# Patient Record
Sex: MALE | Race: OTHER | HISPANIC OR LATINO | URBAN - METROPOLITAN AREA
[De-identification: names, ages, dates, MRNs, and addresses within clinical notes are randomized per-mention and may not be internally consistent; named-entity substitution may affect disease eponyms.]

---

## 2021-03-14 ENCOUNTER — INPATIENT (INPATIENT)
Facility: HOSPITAL | Age: 56
LOS: 88 days | Discharge: NURSING, REHAB OR LONG-TERM CARE | DRG: 3 | End: 2021-06-11
Attending: INTERNAL MEDICINE | Admitting: INTERNAL MEDICINE
Payer: COMMERCIAL

## 2021-03-14 VITALS
HEIGHT: 66 IN | HEART RATE: 112 BPM | RESPIRATION RATE: 42 BRPM | TEMPERATURE: 100 F | WEIGHT: 184.97 LBS | OXYGEN SATURATION: 88 % | SYSTOLIC BLOOD PRESSURE: 136 MMHG | DIASTOLIC BLOOD PRESSURE: 76 MMHG

## 2021-03-14 DIAGNOSIS — Z90.49 ACQUIRED ABSENCE OF OTHER SPECIFIED PARTS OF DIGESTIVE TRACT: Chronic | ICD-10-CM

## 2021-03-14 DIAGNOSIS — N39.0 URINARY TRACT INFECTION, SITE NOT SPECIFIED: ICD-10-CM

## 2021-03-14 DIAGNOSIS — Z29.9 ENCOUNTER FOR PROPHYLACTIC MEASURES, UNSPECIFIED: ICD-10-CM

## 2021-03-14 DIAGNOSIS — R09.02 HYPOXEMIA: ICD-10-CM

## 2021-03-14 DIAGNOSIS — U07.1 COVID-19: ICD-10-CM

## 2021-03-14 DIAGNOSIS — R07.9 CHEST PAIN, UNSPECIFIED: ICD-10-CM

## 2021-03-14 LAB
ALBUMIN SERPL ELPH-MCNC: 2.6 G/DL — LOW (ref 3.5–5)
ALP SERPL-CCNC: 74 U/L — SIGNIFICANT CHANGE UP (ref 40–120)
ALT FLD-CCNC: 114 U/L DA — HIGH (ref 10–60)
ANION GAP SERPL CALC-SCNC: 12 MMOL/L — SIGNIFICANT CHANGE UP (ref 5–17)
APPEARANCE UR: CLEAR — SIGNIFICANT CHANGE UP
AST SERPL-CCNC: 108 U/L — HIGH (ref 10–40)
BACTERIA # UR AUTO: ABNORMAL /HPF
BASOPHILS # BLD AUTO: 0.01 K/UL — SIGNIFICANT CHANGE UP (ref 0–0.2)
BASOPHILS NFR BLD AUTO: 0.1 % — SIGNIFICANT CHANGE UP (ref 0–2)
BILIRUB SERPL-MCNC: 0.5 MG/DL — SIGNIFICANT CHANGE UP (ref 0.2–1.2)
BILIRUB UR-MCNC: NEGATIVE — SIGNIFICANT CHANGE UP
BUN SERPL-MCNC: 15 MG/DL — SIGNIFICANT CHANGE UP (ref 7–18)
CALCIUM SERPL-MCNC: 7.9 MG/DL — LOW (ref 8.4–10.5)
CHLORIDE SERPL-SCNC: 103 MMOL/L — SIGNIFICANT CHANGE UP (ref 96–108)
CK SERPL-CCNC: 238 U/L — HIGH (ref 35–232)
CO2 SERPL-SCNC: 23 MMOL/L — SIGNIFICANT CHANGE UP (ref 22–31)
COLOR SPEC: YELLOW — SIGNIFICANT CHANGE UP
CREAT SERPL-MCNC: 1.1 MG/DL — SIGNIFICANT CHANGE UP (ref 0.5–1.3)
CRP SERPL-MCNC: 232 MG/L — HIGH
D DIMER BLD IA.RAPID-MCNC: 423 NG/ML DDU — HIGH
DIFF PNL FLD: ABNORMAL
EOSINOPHIL # BLD AUTO: 0 K/UL — SIGNIFICANT CHANGE UP (ref 0–0.5)
EOSINOPHIL NFR BLD AUTO: 0 % — SIGNIFICANT CHANGE UP (ref 0–6)
EPI CELLS # UR: SIGNIFICANT CHANGE UP /HPF
FERRITIN SERPL-MCNC: 2803 NG/ML — HIGH (ref 30–400)
GLUCOSE SERPL-MCNC: 125 MG/DL — HIGH (ref 70–99)
GLUCOSE UR QL: NEGATIVE — SIGNIFICANT CHANGE UP
HCT VFR BLD CALC: 41.9 % — SIGNIFICANT CHANGE UP (ref 39–50)
HGB BLD-MCNC: 14.4 G/DL — SIGNIFICANT CHANGE UP (ref 13–17)
IMM GRANULOCYTES NFR BLD AUTO: 0.6 % — SIGNIFICANT CHANGE UP (ref 0–1.5)
KETONES UR-MCNC: NEGATIVE — SIGNIFICANT CHANGE UP
LACTATE SERPL-SCNC: 1.9 MMOL/L — SIGNIFICANT CHANGE UP (ref 0.7–2)
LACTATE SERPL-SCNC: 3.3 MMOL/L — HIGH (ref 0.7–2)
LEUKOCYTE ESTERASE UR-ACNC: NEGATIVE — SIGNIFICANT CHANGE UP
LYMPHOCYTES # BLD AUTO: 0.48 K/UL — LOW (ref 1–3.3)
LYMPHOCYTES # BLD AUTO: 6.9 % — LOW (ref 13–44)
MCHC RBC-ENTMCNC: 32 PG — SIGNIFICANT CHANGE UP (ref 27–34)
MCHC RBC-ENTMCNC: 34.4 GM/DL — SIGNIFICANT CHANGE UP (ref 32–36)
MCV RBC AUTO: 93.1 FL — SIGNIFICANT CHANGE UP (ref 80–100)
MONOCYTES # BLD AUTO: 0.2 K/UL — SIGNIFICANT CHANGE UP (ref 0–0.9)
MONOCYTES NFR BLD AUTO: 2.9 % — SIGNIFICANT CHANGE UP (ref 2–14)
NEUTROPHILS # BLD AUTO: 6.23 K/UL — SIGNIFICANT CHANGE UP (ref 1.8–7.4)
NEUTROPHILS NFR BLD AUTO: 89.5 % — HIGH (ref 43–77)
NITRITE UR-MCNC: NEGATIVE — SIGNIFICANT CHANGE UP
NRBC # BLD: 0 /100 WBCS — SIGNIFICANT CHANGE UP (ref 0–0)
PH UR: 6.5 — SIGNIFICANT CHANGE UP (ref 5–8)
PLATELET # BLD AUTO: 155 K/UL — SIGNIFICANT CHANGE UP (ref 150–400)
POTASSIUM SERPL-MCNC: 3.6 MMOL/L — SIGNIFICANT CHANGE UP (ref 3.5–5.3)
POTASSIUM SERPL-SCNC: 3.6 MMOL/L — SIGNIFICANT CHANGE UP (ref 3.5–5.3)
PROCALCITONIN SERPL-MCNC: 0.54 NG/ML — HIGH (ref 0.02–0.1)
PROT SERPL-MCNC: 6.5 G/DL — SIGNIFICANT CHANGE UP (ref 6–8.3)
PROT UR-MCNC: 100
RAPID RVP RESULT: DETECTED
RBC # BLD: 4.5 M/UL — SIGNIFICANT CHANGE UP (ref 4.2–5.8)
RBC # FLD: 13.2 % — SIGNIFICANT CHANGE UP (ref 10.3–14.5)
RBC CASTS # UR COMP ASSIST: SIGNIFICANT CHANGE UP /HPF (ref 0–2)
SARS-COV-2 RNA SPEC QL NAA+PROBE: DETECTED
SODIUM SERPL-SCNC: 138 MMOL/L — SIGNIFICANT CHANGE UP (ref 135–145)
SP GR SPEC: 1.01 — SIGNIFICANT CHANGE UP (ref 1.01–1.02)
TROPONIN I SERPL-MCNC: <0.015 NG/ML — SIGNIFICANT CHANGE UP (ref 0–0.04)
TROPONIN I SERPL-MCNC: <0.015 NG/ML — SIGNIFICANT CHANGE UP (ref 0–0.04)
UROBILINOGEN FLD QL: 1
WBC # BLD: 6.96 K/UL — SIGNIFICANT CHANGE UP (ref 3.8–10.5)
WBC # FLD AUTO: 6.96 K/UL — SIGNIFICANT CHANGE UP (ref 3.8–10.5)
WBC UR QL: SIGNIFICANT CHANGE UP /HPF (ref 0–5)

## 2021-03-14 PROCEDURE — 99285 EMERGENCY DEPT VISIT HI MDM: CPT

## 2021-03-14 PROCEDURE — 71045 X-RAY EXAM CHEST 1 VIEW: CPT | Mod: 26

## 2021-03-14 RX ORDER — SODIUM CHLORIDE 9 MG/ML
1000 INJECTION INTRAMUSCULAR; INTRAVENOUS; SUBCUTANEOUS ONCE
Refills: 0 | Status: DISCONTINUED | OUTPATIENT
Start: 2021-03-14 | End: 2021-03-14

## 2021-03-14 RX ORDER — ACETAMINOPHEN 500 MG
975 TABLET ORAL ONCE
Refills: 0 | Status: COMPLETED | OUTPATIENT
Start: 2021-03-14 | End: 2021-03-14

## 2021-03-14 RX ORDER — DEXAMETHASONE 0.5 MG/5ML
6 ELIXIR ORAL DAILY
Refills: 0 | Status: COMPLETED | OUTPATIENT
Start: 2021-03-14 | End: 2021-03-24

## 2021-03-14 RX ORDER — INSULIN LISPRO 100/ML
VIAL (ML) SUBCUTANEOUS
Refills: 0 | Status: DISCONTINUED | OUTPATIENT
Start: 2021-03-14 | End: 2021-03-29

## 2021-03-14 RX ORDER — GLUCAGON INJECTION, SOLUTION 0.5 MG/.1ML
1 INJECTION, SOLUTION SUBCUTANEOUS ONCE
Refills: 0 | Status: DISCONTINUED | OUTPATIENT
Start: 2021-03-14 | End: 2021-03-14

## 2021-03-14 RX ORDER — REMDESIVIR 5 MG/ML
100 INJECTION INTRAVENOUS EVERY 24 HOURS
Refills: 0 | Status: DISCONTINUED | OUTPATIENT
Start: 2021-03-15 | End: 2021-03-15

## 2021-03-14 RX ORDER — REMDESIVIR 5 MG/ML
INJECTION INTRAVENOUS
Refills: 0 | Status: DISCONTINUED | OUTPATIENT
Start: 2021-03-14 | End: 2021-03-15

## 2021-03-14 RX ORDER — SODIUM CHLORIDE 9 MG/ML
1000 INJECTION, SOLUTION INTRAVENOUS
Refills: 0 | Status: DISCONTINUED | OUTPATIENT
Start: 2021-03-14 | End: 2021-03-14

## 2021-03-14 RX ORDER — REMDESIVIR 5 MG/ML
200 INJECTION INTRAVENOUS EVERY 24 HOURS
Refills: 0 | Status: COMPLETED | OUTPATIENT
Start: 2021-03-14 | End: 2021-03-14

## 2021-03-14 RX ORDER — ACETAMINOPHEN 500 MG
650 TABLET ORAL EVERY 6 HOURS
Refills: 0 | Status: DISCONTINUED | OUTPATIENT
Start: 2021-03-14 | End: 2021-03-29

## 2021-03-14 RX ORDER — PANTOPRAZOLE SODIUM 20 MG/1
40 TABLET, DELAYED RELEASE ORAL
Refills: 0 | Status: DISCONTINUED | OUTPATIENT
Start: 2021-03-14 | End: 2021-03-29

## 2021-03-14 RX ORDER — ENOXAPARIN SODIUM 100 MG/ML
40 INJECTION SUBCUTANEOUS DAILY
Refills: 0 | Status: DISCONTINUED | OUTPATIENT
Start: 2021-03-14 | End: 2021-04-03

## 2021-03-14 RX ORDER — ALBUTEROL 90 UG/1
2 AEROSOL, METERED ORAL EVERY 6 HOURS
Refills: 0 | Status: DISCONTINUED | OUTPATIENT
Start: 2021-03-14 | End: 2021-03-18

## 2021-03-14 RX ORDER — SODIUM CHLORIDE 9 MG/ML
1000 INJECTION INTRAMUSCULAR; INTRAVENOUS; SUBCUTANEOUS ONCE
Refills: 0 | Status: COMPLETED | OUTPATIENT
Start: 2021-03-14 | End: 2021-03-14

## 2021-03-14 RX ADMIN — Medication 975 MILLIGRAM(S): at 13:01

## 2021-03-14 RX ADMIN — REMDESIVIR 500 MILLIGRAM(S): 5 INJECTION INTRAVENOUS at 17:25

## 2021-03-14 RX ADMIN — SODIUM CHLORIDE 1000 MILLILITER(S): 9 INJECTION INTRAMUSCULAR; INTRAVENOUS; SUBCUTANEOUS at 15:23

## 2021-03-14 RX ADMIN — SODIUM CHLORIDE 1000 MILLILITER(S): 9 INJECTION INTRAMUSCULAR; INTRAVENOUS; SUBCUTANEOUS at 14:23

## 2021-03-14 RX ADMIN — Medication 975 MILLIGRAM(S): at 14:00

## 2021-03-14 NOTE — H&P ADULT - NSHPPHYSICALEXAM_GEN_ALL_CORE
VS: T 99.1, HR 76, /65, RR 18, and SPO2 98%  General: diaphoretic, uncomfortable -appearing  male of stated age  HEENT: EOMI, PERRL, atraumatic, normocephalic, neck supple  Resp: no increased WOB, significant coughing with deeep breath, NC in place  CV: RRR  GI: soft, NTND  MSK: no joint tenderness  Neuro: AAOx3, no FND  Psych: appropriate mood and affect  Heme: no unsual bruising noted  Skin: CDI

## 2021-03-14 NOTE — ED PROVIDER NOTE - CLINICAL SUMMARY MEDICAL DECISION MAKING FREE TEXT BOX
Patient with hypoxia and tachypnea. Suspect COVID-19. Will admit for administration of supplemental oxygen.

## 2021-03-14 NOTE — H&P ADULT - PROBLEM SELECTOR PLAN 1
presented 3/14 with x9 days worsening cough, subjective fevers, and SOB  -in ED, T 99.1, HR 76, /65, RR 18, and SPO2 98%  -s/p x1 dose Tylenol and 1L IVF bolus in ED  -COVID +PCR, fu Ab  -labs with lymphopenia and neutrophilia despite WBC wnl, d-dimer 423, AST//114, consistent with COVID  -lactate 3.3, and , possibly 2/2 prolonged poor PO intake in setting of acute illness  -CXR notable for b/l infiltrates, L>R  -fu ABG to assess disease severity  -remdesivir (Dr. Villalba approved) 3/14-18, Decadron 3/14-23 with PPI and HSS while on steroids  -supportive care with Tylenol, robitussin, albuterol  -fu LDH, CRP, ferritin, procal in am, repeat d-dimer 3/17 presented 3/14 with x9 days worsening cough, subjective fevers, and SOB  -in ED, T 99.1, HR 76, /65, RR 18, and SPO2 98%  -s/p x1 dose Tylenol and 1L IVF bolus in ED  -COVID +PCR, fu Ab  -labs with lymphopenia and neutrophilia despite WBC wnl, d-dimer 423, AST//114, consistent with COVID  -lactate 3.3, and , possibly 2/2 prolonged poor PO intake in setting of acute illness  -CXR notable for b/l infiltrates, L>R  -fu ABG to assess disease severity  -remdesivir (Dr. Villalba approved) 3/14-18, Decadron 3/14-23 with PPI and HSS while on steroids  -supportive care with Tylenol, robitussin, albuterol  -fu LDH, CRP, ferritin, procal in am, repeat d-dimer 3/17, monitor LFTs with daily CMP presented 3/14 with x9 days worsening cough, subjective fevers, and SOB  -in ED, T 99.1, HR 76, /65, RR 18, and SPO2 98%  -s/p x1 dose Tylenol and 1L IVF bolus in ED  -COVID +PCR, fu Ab  -labs with lymphopenia and neutrophilia despite WBC wnl, d-dimer 423, AST//114, consistent with COVID  -lactate 3.3, and , possibly 2/2 prolonged poor PO intake in setting of acute illness, fu repeat lactate after bolus ot make sure downtrending  -CXR notable for b/l infiltrates, L>R  -fu ABG to assess disease severity  -remdesivir (Dr. Villalba approved) 3/14-18, Decadron 3/14-23 with PPI and HSS while on steroids  -supportive care with Tylenol, robitussin, albuterol  -fu LDH, CRP, ferritin, procal in am, repeat d-dimer 3/17, monitor LFTs with daily CMP presented 3/14 with x9 days worsening cough, subjective fevers, and SOB  -in ED, T 99.1, HR 76, /65, RR 18, and SPO2 98% RA  -s/p x1 dose Tylenol and 1L IVF bolus in ED  -COVID +PCR, fu Ab  -labs with lymphopenia and neutrophilia despite WBC wnl, d-dimer 423, AST//114, consistent with COVID  -lactate 3.3, and , possibly 2/2 prolonged poor PO intake in setting of acute illness, fu repeat lactate after bolus ot make sure downtrending  -CXR notable for b/l infiltrates, L>R  -fu ABG to assess disease severity  -remdesivir (Dr. Villalba approved) 3/14-18, Decadron 3/14-23 with PPI and HSS while on steroids  -supportive care with Tylenol, robitussin, albuterol  -fu LDH, CRP, ferritin, procal in am, repeat d-dimer 3/17, monitor LFTs with daily CMP presented 3/14 with x9 days worsening cough, subjective fevers, and SOB  -in ED, T 99.1, HR 76, /65, RR 18, and SPO2 98% RA  -s/p x1 dose Tylenol and 1L IVF bolus in ED  -requiring 4L NC for ease of breathing in ED  -COVID +PCR, fu Ab  -labs with lymphopenia and neutrophilia despite WBC wnl, d-dimer 423, AST//114, consistent with COVID  -lactate 3.3, and , possibly 2/2 prolonged poor PO intake in setting of acute illness, fu repeat lactate after bolus ot make sure downtrending  -CXR notable for b/l infiltrates, L>R  -fu ABG to assess disease severity  -remdesivir (Dr. Villalba approved) 3/14-18, Decadron 3/14-23 with PPI and HSS while on steroids  -supportive care with Tylenol, robitussin, albuterol  -fu LDH, CRP, ferritin, procal in am, repeat d-dimer 3/17, monitor LFTs with daily CMP

## 2021-03-14 NOTE — H&P ADULT - ASSESSMENT
55M, no PMH, PSH appy and moody 1990s presented 3/14 with x9 days worsening cough, subjective fevers, and SOB, with x2-3 days dysuria and central, non-radiating, constant CP, admitted to [facility] for management of COVID PNA.

## 2021-03-14 NOTE — H&P ADULT - HISTORY OF PRESENT ILLNESS
55M, no PMH, PSH appy and moody 1990s presented 3/14 with x9 days worsening cough, subjective fevers, and SOB, with x2-3 days dysuria and central, non-radiating, constant CP. Additionally endorses HA, LH, and myaglias, and denies abdominal pain and n/v/d. Patient is a nonsmoker, lives alone, and denies sick contacts. In ED, T 99.1, HR 76, /65, RR 18, and SPO2 98%. Labs notable for lymphopenia and neutrophilia despite WBC wnl, d-dimer 423, AST//114, lactate 3.3, and . Trop negative x1. +COVID. CXR notable for b/l infiltrates, L>R. Given x1 dose Tylenol and 1L IVF bolus in ED. Admitted to [facility] for management of COVID PNA.  55M, no PMH, PSH appy and moody 1990s presented 3/14 with x9 days worsening cough, subjective fevers, and SOB, with x2-3 days dysuria and central, non-radiating, constant CP. Additionally endorses HA, LH, and myaglias, and denies abdominal pain and n/v/d. Patient is a nonsmoker, lives alone, and denies sick contacts. In ED, T 99.1, HR 76, /65, RR 18, and SPO2 98%. Given discomfort of breathing, placed on 4L NC, with improvement. Labs notable for lymphopenia and neutrophilia despite WBC wnl, d-dimer 423, AST//114, lactate 3.3, and . Trop negative x1. +COVID. CXR notable for b/l infiltrates, L>R. Given x1 dose Tylenol and 1L IVF bolus in ED. Admitted to [facility] for management of COVID PNA.

## 2021-03-14 NOTE — H&P ADULT - PROBLEM SELECTOR PLAN 3
on adm endorsed x2 days central, non-radiating, constant CP, resolved at time of interview  -trop negative x1, fu repeat x2  -fu EKG  -unlikely to be ACS given HEART score 1-3 for age, possible obesity (BMI not yet listed), and unknown EKG results; KIM 0-1 given unknown EKG results  -fu a1c, B12, folate, TSH, and lipid panel  -DASH diet

## 2021-03-14 NOTE — ED PROVIDER NOTE - OBJECTIVE STATEMENT
55 year old male with no significant PMHx or PSHx presents to the ED with complaints of nine days of fevers, body aches, shortness of breath, and weakness. Patient states that he has not had any known sick contacts. Patient reports that he has not had a COVID-19 test done. Patient otherwise denies any vomiting, diarrhea, and all other acute complaints. NKDA.

## 2021-03-14 NOTE — ED ADULT NURSE NOTE - CHIEF COMPLAINT QUOTE
C/o fever, headache and difficulty breathing x 9 days. Brother Alec [phone]. Tylenol taken at 6am

## 2021-03-14 NOTE — H&P ADULT - PROBLEM SELECTOR PLAN 2
x2-3 days dysuria at time of presentation  -fu UA and UCx x2-3 days dysuria at time of presentation  -fu UA and UCx  -will add abx if UA positive

## 2021-03-14 NOTE — ED ADULT NURSE NOTE - NSIMPLEMENTINTERV_GEN_ALL_ED
Implemented All Universal Safety Interventions:  [place] to call system. Call bell, personal items and telephone within reach. Instruct patient to call for assistance. Room bathroom lighting operational. Non-slip footwear when patient is off stretcher. Physically safe environment: no spills, clutter or unnecessary equipment. Stretcher in lowest position, wheels locked, appropriate side rails in place.

## 2021-03-14 NOTE — ED ADULT TRIAGE NOTE - CHIEF COMPLAINT QUOTE
C/o fever, headache and difficulty breathing x 9 days. Brother Alec [phone]. Tylenol taken at 6am

## 2021-03-14 NOTE — H&P ADULT - NSHPREVIEWOFSYSTEMS_GEN_ALL_CORE
General: malaise, feverish  HEENT: HA, LH, no vision changes, no recent LOC   Resp: SOB, OCONNELL, cough, worse with deep inspiration  CV: central CP, resolved at time of interview  GI: no abdominal pain  : dysuria, no changes in frequency  MSK: diffuse myalgias, no arthralgias  Neuro: no FND, no n/t  Psych: no issues  Heme: denies bruising  Skin: no itching or rashes  all other systems reviewed and are negative

## 2021-03-15 DIAGNOSIS — R74.01 ELEVATION OF LEVELS OF LIVER TRANSAMINASE LEVELS: ICD-10-CM

## 2021-03-15 LAB
A1C WITH ESTIMATED AVERAGE GLUCOSE RESULT: 5.8 % — HIGH (ref 4–5.6)
ALBUMIN SERPL ELPH-MCNC: 2.3 G/DL — LOW (ref 3.5–5)
ALP SERPL-CCNC: 72 U/L — SIGNIFICANT CHANGE UP (ref 40–120)
ALT FLD-CCNC: 170 U/L DA — HIGH (ref 10–60)
ANION GAP SERPL CALC-SCNC: 9 MMOL/L — SIGNIFICANT CHANGE UP (ref 5–17)
AST SERPL-CCNC: 147 U/L — HIGH (ref 10–40)
BILIRUB SERPL-MCNC: 0.6 MG/DL — SIGNIFICANT CHANGE UP (ref 0.2–1.2)
BUN SERPL-MCNC: 15 MG/DL — SIGNIFICANT CHANGE UP (ref 7–18)
CALCIUM SERPL-MCNC: 7.6 MG/DL — LOW (ref 8.4–10.5)
CHLORIDE SERPL-SCNC: 106 MMOL/L — SIGNIFICANT CHANGE UP (ref 96–108)
CHOLEST SERPL-MCNC: 102 MG/DL — SIGNIFICANT CHANGE UP
CO2 SERPL-SCNC: 25 MMOL/L — SIGNIFICANT CHANGE UP (ref 22–31)
CREAT SERPL-MCNC: 0.81 MG/DL — SIGNIFICANT CHANGE UP (ref 0.5–1.3)
CRP SERPL-MCNC: 234 MG/L — HIGH
CULTURE RESULTS: NO GROWTH — SIGNIFICANT CHANGE UP
ESTIMATED AVERAGE GLUCOSE: 120 MG/DL — HIGH (ref 68–114)
FERRITIN SERPL-MCNC: 3716 NG/ML — HIGH (ref 30–400)
FOLATE SERPL-MCNC: 15.7 NG/ML — SIGNIFICANT CHANGE UP
GLUCOSE SERPL-MCNC: 104 MG/DL — HIGH (ref 70–99)
HCT VFR BLD CALC: 40.8 % — SIGNIFICANT CHANGE UP (ref 39–50)
HCV AB S/CO SERPL IA: 0.09 S/CO — SIGNIFICANT CHANGE UP (ref 0–0.99)
HCV AB SERPL-IMP: SIGNIFICANT CHANGE UP
HDLC SERPL-MCNC: 37 MG/DL — LOW
HGB BLD-MCNC: 13.7 G/DL — SIGNIFICANT CHANGE UP (ref 13–17)
LDH SERPL L TO P-CCNC: 595 U/L — HIGH (ref 120–225)
LIPID PNL WITH DIRECT LDL SERPL: 45 MG/DL — SIGNIFICANT CHANGE UP
MAGNESIUM SERPL-MCNC: 2.3 MG/DL — SIGNIFICANT CHANGE UP (ref 1.6–2.6)
MCHC RBC-ENTMCNC: 31.3 PG — SIGNIFICANT CHANGE UP (ref 27–34)
MCHC RBC-ENTMCNC: 33.6 GM/DL — SIGNIFICANT CHANGE UP (ref 32–36)
MCV RBC AUTO: 93.2 FL — SIGNIFICANT CHANGE UP (ref 80–100)
NON HDL CHOLESTEROL: 65 MG/DL — SIGNIFICANT CHANGE UP
NRBC # BLD: 0 /100 WBCS — SIGNIFICANT CHANGE UP (ref 0–0)
PHOSPHATE SERPL-MCNC: 2.9 MG/DL — SIGNIFICANT CHANGE UP (ref 2.5–4.5)
PLATELET # BLD AUTO: 159 K/UL — SIGNIFICANT CHANGE UP (ref 150–400)
POTASSIUM SERPL-MCNC: 3.7 MMOL/L — SIGNIFICANT CHANGE UP (ref 3.5–5.3)
POTASSIUM SERPL-SCNC: 3.7 MMOL/L — SIGNIFICANT CHANGE UP (ref 3.5–5.3)
PROCALCITONIN SERPL-MCNC: 0.66 NG/ML — HIGH (ref 0.02–0.1)
PROT SERPL-MCNC: 6.1 G/DL — SIGNIFICANT CHANGE UP (ref 6–8.3)
RBC # BLD: 4.38 M/UL — SIGNIFICANT CHANGE UP (ref 4.2–5.8)
RBC # FLD: 13.3 % — SIGNIFICANT CHANGE UP (ref 10.3–14.5)
SARS-COV-2 IGG SERPL QL IA: POSITIVE
SARS-COV-2 IGM SERPL IA-ACNC: 6.41 INDEX — HIGH
SODIUM SERPL-SCNC: 140 MMOL/L — SIGNIFICANT CHANGE UP (ref 135–145)
SPECIMEN SOURCE: SIGNIFICANT CHANGE UP
TRIGL SERPL-MCNC: 100 MG/DL — SIGNIFICANT CHANGE UP
TROPONIN I SERPL-MCNC: <0.015 NG/ML — SIGNIFICANT CHANGE UP (ref 0–0.04)
TSH SERPL-MCNC: 0.26 UU/ML — LOW (ref 0.34–4.82)
VIT B12 SERPL-MCNC: 1549 PG/ML — HIGH (ref 232–1245)
WBC # BLD: 6.84 K/UL — SIGNIFICANT CHANGE UP (ref 3.8–10.5)
WBC # FLD AUTO: 6.84 K/UL — SIGNIFICANT CHANGE UP (ref 3.8–10.5)

## 2021-03-15 RX ADMIN — Medication 650 MILLIGRAM(S): at 08:45

## 2021-03-15 RX ADMIN — Medication 6 MILLIGRAM(S): at 05:59

## 2021-03-15 RX ADMIN — ENOXAPARIN SODIUM 40 MILLIGRAM(S): 100 INJECTION SUBCUTANEOUS at 12:02

## 2021-03-15 RX ADMIN — PANTOPRAZOLE SODIUM 40 MILLIGRAM(S): 20 TABLET, DELAYED RELEASE ORAL at 08:05

## 2021-03-15 RX ADMIN — Medication 1: at 11:21

## 2021-03-15 NOTE — PATIENT PROFILE ADULT - TRANSPORTATION
no

## 2021-03-15 NOTE — ED ADULT NURSE REASSESSMENT NOTE - NS ED NURSE REASSESS COMMENT FT1
Patient A&OX4, 4remains in stable condition. No acute respiratory distress noted. Denies any c/o pain/discomfort.

## 2021-03-15 NOTE — CONSULT NOTE ADULT - SUBJECTIVE AND OBJECTIVE BOX
PULMONARY CONSULT NOTE      ARANZA CHAMBERS  MRN-[id number]    Patient is a 55y old  Male who presents with a chief complaint of SOB (15 Mar 2021 07:40)    History of Present Illness:  Reason for Admission: SOB  History of Present Illness:   55M, no PMH, PSH appy and moody  presented 3/ with x9 days worsening cough, subjective fevers, and SOB, with x2-3 days dysuria and central, non-radiating, constant CP. Additionally endorses HA, LH, and myaglias, and denies abdominal pain and n/v/d. Patient is a nonsmoker, lives alone, and denies sick contacts. In ED, T 99.1, HR 76, /65, RR 18, and SPO2 98%. Given discomfort of breathing, placed on 4L NC, with improvement. Labs notable for lymphopenia and neutrophilia despite WBC wnl, d-dimer 423, AST//114, lactate 3.3, and . Trop negative x1. +COVID. CXR notable for b/l infiltrates, L>R. Given x1 dose Tylenol and 1L IVF bolus in ED. Admitted to [facility] for management of COVID PNA.     HISTORY OF PRESENT ILLNESS: As above. Awake, alert, comfortable in bed in NAD. Currently on oxygen supp via NRBM    MEDICATIONS  (STANDING):  dexAMETHasone     Tablet 6 milliGRAM(s) Oral daily  enoxaparin Injectable 40 milliGRAM(s) SubCutaneous daily  insulin lispro (ADMELOG) corrective regimen sliding scale   SubCutaneous three times a day before meals  pantoprazole    Tablet 40 milliGRAM(s) Oral before breakfast      MEDICATIONS  (PRN):  acetaminophen   Tablet .. 650 milliGRAM(s) Oral every 6 hours PRN Temp greater or equal to 38C (100.4F), Moderate Pain (4 - 6)  ALBUTerol    90 MICROgram(s) HFA Inhaler 2 Puff(s) Inhalation every 6 hours PRN Shortness of Breath and/or Wheezing  guaiFENesin   Syrup  (Sugar-Free) 100 milliGRAM(s) Oral every 6 hours PRN Cough      Allergies    No Known Allergies    Intolerances        PAST MEDICAL & SURGICAL HISTORY:  No pertinent past medical history    S/P appendectomy      S/P moody          FAMILY HISTORY:      SOCIAL HISTORY  Smoking History:     REVIEW OF SYSTEMS:    CONSTITUTIONAL:  No fevers, chills, sweats    HEENT:  Eyes:  No diplopia or blurred vision. ENT:  No earache, sore throat or runny nose.    CARDIOVASCULAR:  No pressure, squeezing, tightness, or heaviness about the chest; no palpitations.    RESPIRATORY:  Per HPI    GASTROINTESTINAL:  No abdominal pain, nausea, vomiting or diarrhea.    GENITOURINARY:  No dysuria, frequency or urgency.    NEUROLOGIC:  No paresthesias, fasciculations, seizures or weakness.    PSYCHIATRIC:  No disorder of thought or mood.    Vital Signs Last 24 Hrs  T(C): 38.4 (15 Mar 2021 08:24), Max: 38.4 (15 Mar 2021 08:24)  T(F): 101.1 (15 Mar 2021 08:24), Max: 101.1 (15 Mar 2021 08:24)  HR: 76 (15 Mar 2021 07:58) (76 - 112)  BP: 115/67 (15 Mar 2021 07:58) (115/67 - 158/83)  BP(mean): --  RR: 17 (15 Mar 2021 07:58) (17 - 42)  SpO2: 96% (15 Mar 2021 07:58) (88% - 98%)  I&O's Detail      PHYSICAL EXAMINATION:    GENERAL: The patient is a well-developed, well-nourished _____in no apparent distress.     HEENT: Head is normocephalic and atraumatic. Extraocular muscles are intact. Mucous membranes are moist.     NECK: Supple.     LUNGS: Clear to auscultation without wheezing, rales, or rhonchi. Respirations unlabored    HEART: Regular rate and rhythm without murmur.    ABDOMEN: Soft, nontender, and nondistended.  No hepatosplenomegaly is noted.    EXTREMITIES: Without any cyanosis, clubbing, rash, lesions or edema.    NEUROLOGIC: Grossly intact.      LABS:                        13.7   6.84  )-----------( 159      ( 15 Mar 2021 05:38 )             40.8     03-15    140  |  106  |  15  ----------------------------<  104<H>  3.7   |  25  |  0.81    Ca    7.6<L>      15 Mar 2021 05:38  Phos  2.9     03-15  Mg     2.3     03-15    TPro  6.1  /  Alb  2.3<L>  /  TBili  0.6  /  DBili  x   /  AST  147<H>  /  ALT  170<H>  /  AlkPhos  72  03-15      Urinalysis Basic - ( 14 Mar 2021 17:56 )    Color: Yellow / Appearance: Clear / S.015 / pH: x  Gluc: x / Ketone: Negative  / Bili: Negative / Urobili: 1   Blood: x / Protein: 100 / Nitrite: Negative   Leuk Esterase: Negative / RBC: 0-2 /HPF / WBC 0-2 /HPF   Sq Epi: x / Non Sq Epi: Few /HPF / Bacteria: Few /HPF        CARDIAC MARKERS ( 15 Mar 2021 05:38 )  <0.015 ng/mL / x     / x     / x     / x      CARDIAC MARKERS ( 14 Mar 2021 21:34 )  <0.015 ng/mL / x     / x     / x     / x      CARDIAC MARKERS ( 14 Mar 2021 13:21 )  <0.015 ng/mL / x     / 238 U/L / x     / x          D-Dimer Assay, Quantitative: 423 ng/mL DDU (21 @ 13:21)      Lactate, Blood: 1.9 mmol/L (21 @ 21:34)  Lactate, Blood: 3.3 mmol/L (21 @ 13:20)    Procalcitonin, Serum: 0.54 ng/mL (21 @ 18:10)      MICROBIOLOGY:    RADIOLOGY & ADDITIONAL STUDIES:    CXR:  < from: Xray Chest 1 View-PORTABLE IMMEDIATE (21 @ 13:14) >  Findings/  Impression: The heart is unremarkable. Large airspace opacity left mid and lower lung.      < end of copied text >    Ct scan chest:    ekg;    echo:

## 2021-03-15 NOTE — PROGRESS NOTE ADULT - SUBJECTIVE AND OBJECTIVE BOX
`Patient is a 55y old  Male who presents with a chief complaint of SOB (14 Mar 2021 16:07)    pt seen in icu [  ], reg med floor [   ], bed [  ], chair at bedside [   ], a+o x3 [  ], lethargic [  ],  nad [  ]    andrea [  ], ngt [  ], peg [  ], et tube [  ], cent line [  ], picc line [  ]        Allergies    No Known Allergies        Vitals    T(F): 98.9 (03-15-21 @ 06:19), Max: 100 (03-14-21 @ 12:03)  HR: 79 (03-15-21 @ 06:19) (76 - 112)  BP: 122/64 (03-15-21 @ 06:19) (121/65 - 158/83)  RR: 20 (03-15-21 @ 06:19) (18 - 42)  SpO2: 95% (03-15-21 @ 06:19) (88% - 98%)  Wt(kg): --  CAPILLARY BLOOD GLUCOSE          Labs                          13.7   6.84  )-----------( 159      ( 15 Mar 2021 05:38 )             40.8       03-15    140  |  106  |  15  ----------------------------<  104<H>  3.7   |  25  |  0.81    Ca    7.6<L>      15 Mar 2021 05:38  Phos  2.9     03-15  Mg     2.3     03-15    TPro  6.1  /  Alb  2.3<L>  /  TBili  0.6  /  DBili  x   /  AST  147<H>  /  ALT  170<H>  /  AlkPhos  72  03-15      CARDIAC MARKERS ( 15 Mar 2021 05:38 )  <0.015 ng/mL / x     / x     / x     / x      CARDIAC MARKERS ( 14 Mar 2021 21:34 )  <0.015 ng/mL / x     / x     / x     / x      CARDIAC MARKERS ( 14 Mar 2021 13:21 )  <0.015 ng/mL / x     / 238 U/L / x     / x                Radiology Results      Meds    MEDICATIONS  (STANDING):  dexAMETHasone     Tablet 6 milliGRAM(s) Oral daily  enoxaparin Injectable 40 milliGRAM(s) SubCutaneous daily  insulin lispro (ADMELOG) corrective regimen sliding scale   SubCutaneous three times a day before meals  pantoprazole    Tablet 40 milliGRAM(s) Oral before breakfast  remdesivir  IVPB   IV Intermittent   remdesivir  IVPB 100 milliGRAM(s) IV Intermittent every 24 hours      MEDICATIONS  (PRN):  acetaminophen   Tablet .. 650 milliGRAM(s) Oral every 6 hours PRN Temp greater or equal to 38C (100.4F), Moderate Pain (4 - 6)  ALBUTerol    90 MICROgram(s) HFA Inhaler 2 Puff(s) Inhalation every 6 hours PRN Shortness of Breath and/or Wheezing  guaiFENesin   Syrup  (Sugar-Free) 100 milliGRAM(s) Oral every 6 hours PRN Cough      Physical Exam    Neuro :  no focal deficits  Respiratory: CTA B/L  CV: RRR, S1S2, no murmurs,   Abdominal: Soft, NT, ND +BS,  Extremities: No edema, + peripheral pulses    ASSESSMENT    Hypoxemia 2nd to covid pna  h/o appendectomy  cholecystectomy        PLAN    contact and airborne isolation  remdesevir  dexamthasone  pepcid, singulair, vit c, vit d, zinc  cont albuterol inhaler   pulm cons  f/u procalcitonin, legionella Ag, strep, mycoplasma Ag  F/u D-dimer, esr, crp, ldh, ferritin, lactate,   cont tylenol prn,   cont robitussin prn   O2 sat (88% - 98%) on nrb 15L  O2 via nrbm and taper as tolerated  cont current meds   `Patient is a 55y old  Male who presents with a chief complaint of SOB (14 Mar 2021 16:07)    pt seen in ed [x  ], reg med floor [   ], bed [ x ], chair at bedside [   ], a+o x3 [ x ], lethargic [  ],  nad [ x ]      Allergies    No Known Allergies        Vitals    T(F): 98.9 (03-15-21 @ 06:19), Max: 100 (03-14-21 @ 12:03)  HR: 79 (03-15-21 @ 06:19) (76 - 112)  BP: 122/64 (03-15-21 @ 06:19) (121/65 - 158/83)  RR: 20 (03-15-21 @ 06:19) (18 - 42)  SpO2: 95% (03-15-21 @ 06:19) (88% - 98%)  Wt(kg): --  CAPILLARY BLOOD GLUCOSE          Labs                          13.7   6.84  )-----------( 159      ( 15 Mar 2021 05:38 )             40.8       03-15    140  |  106  |  15  ----------------------------<  104<H>  3.7   |  25  |  0.81    Ca    7.6<L>      15 Mar 2021 05:38  Phos  2.9     03-15  Mg     2.3     03-15    TPro  6.1  /  Alb  2.3<L>  /  TBili  0.6  /  DBili  x   /  AST  147<H>  /  ALT  170<H>  /  AlkPhos  72  03-15    D-Dimer Assay, Quantitative (03.14.21 @ 13:21)   D-Dimer Assay, Quantitative: 423 ng/mL DDU`    C-Reactive Protein, Serum (03.14.21 @ 18:10)   C-Reactive Protein, Serum: 232:    Ferritin, Serum (03.14.21 @ 18:10)   Ferritin, Serum: 2803:     Procalcitonin, Serum (03.14.21 @ 18:10)   Procalcitonin, Serum: 0.54:  Lactate Dehydrogenase, Serum in AM (03.15.21 @ 05:38)   Lactate Dehydrogenase, Serum: 595 U/L   Lactate, Blood (03.14.21 @ 21:34)   Lactate, Blood: 1.9       CARDIAC MARKERS ( 15 Mar 2021 05:38 )  <0.015 ng/mL / x     / x     / x     / x      CARDIAC MARKERS ( 14 Mar 2021 21:34 )  <0.015 ng/mL / x     / x     / x     / x      CARDIAC MARKERS ( 14 Mar 2021 13:21 )  <0.015 ng/mL / x     / 238 U/L / x     / x        COVID-19 IgG Antibody Index: 6.41: Roche ECLIA Total AB (DIVINE)   COVID-19 IgG Antibody Interpretation: Positive:         Radiology Results      Meds    MEDICATIONS  (STANDING):  dexAMETHasone     Tablet 6 milliGRAM(s) Oral daily  enoxaparin Injectable 40 milliGRAM(s) SubCutaneous daily  insulin lispro (ADMELOG) corrective regimen sliding scale   SubCutaneous three times a day before meals  pantoprazole    Tablet 40 milliGRAM(s) Oral before breakfast  remdesivir  IVPB   IV Intermittent   remdesivir  IVPB 100 milliGRAM(s) IV Intermittent every 24 hours      MEDICATIONS  (PRN):  acetaminophen   Tablet .. 650 milliGRAM(s) Oral every 6 hours PRN Temp greater or equal to 38C (100.4F), Moderate Pain (4 - 6)  ALBUTerol    90 MICROgram(s) HFA Inhaler 2 Puff(s) Inhalation every 6 hours PRN Shortness of Breath and/or Wheezing  guaiFENesin   Syrup  (Sugar-Free) 100 milliGRAM(s) Oral every 6 hours PRN Cough      Physical Exam    Neuro :  no focal deficits  Respiratory: CTA B/L  CV: RRR, S1S2, no murmurs,   Abdominal: Soft, NT, ND +BS,  Extremities: No edema, + peripheral pulses    ASSESSMENT    Hypoxemia 2nd to covid pna  h/o appendectomy  cholecystectomy        PLAN    contact and airborne isolation  d/c remdesevir given covid ab positive noted above  cont dexamethasone  pepcid, singulair, vit c, vit d, zinc  cont albuterol inhaler   pulm cons  procalcitonin, D-dimer, crp, ldh, ferritin, lactate noted above,    cont tylenol prn,   cont robitussin prn   O2 sat (88% - 98%) on nrb 15L  O2 via nrbm and taper as tolerated  cont current meds

## 2021-03-16 ENCOUNTER — TRANSCRIPTION ENCOUNTER (OUTPATIENT)
Age: 56
End: 2021-03-16

## 2021-03-16 DIAGNOSIS — Z02.9 ENCOUNTER FOR ADMINISTRATIVE EXAMINATIONS, UNSPECIFIED: ICD-10-CM

## 2021-03-16 LAB
ANION GAP SERPL CALC-SCNC: 7 MMOL/L — SIGNIFICANT CHANGE UP (ref 5–17)
BUN SERPL-MCNC: 24 MG/DL — HIGH (ref 7–18)
CALCIUM SERPL-MCNC: 8.3 MG/DL — LOW (ref 8.4–10.5)
CHLORIDE SERPL-SCNC: 111 MMOL/L — HIGH (ref 96–108)
CO2 SERPL-SCNC: 25 MMOL/L — SIGNIFICANT CHANGE UP (ref 22–31)
CREAT SERPL-MCNC: 0.86 MG/DL — SIGNIFICANT CHANGE UP (ref 0.5–1.3)
GLUCOSE BLDC GLUCOMTR-MCNC: 106 MG/DL — HIGH (ref 70–99)
GLUCOSE BLDC GLUCOMTR-MCNC: 112 MG/DL — HIGH (ref 70–99)
GLUCOSE BLDC GLUCOMTR-MCNC: 114 MG/DL — HIGH (ref 70–99)
GLUCOSE BLDC GLUCOMTR-MCNC: 139 MG/DL — HIGH (ref 70–99)
GLUCOSE BLDC GLUCOMTR-MCNC: 145 MG/DL — HIGH (ref 70–99)
GLUCOSE BLDC GLUCOMTR-MCNC: 147 MG/DL — HIGH (ref 70–99)
GLUCOSE BLDC GLUCOMTR-MCNC: 162 MG/DL — HIGH (ref 70–99)
GLUCOSE SERPL-MCNC: 169 MG/DL — HIGH (ref 70–99)
HCT VFR BLD CALC: 42.1 % — SIGNIFICANT CHANGE UP (ref 39–50)
HGB BLD-MCNC: 14.2 G/DL — SIGNIFICANT CHANGE UP (ref 13–17)
MAGNESIUM SERPL-MCNC: 2.6 MG/DL — SIGNIFICANT CHANGE UP (ref 1.6–2.6)
MCHC RBC-ENTMCNC: 31.6 PG — SIGNIFICANT CHANGE UP (ref 27–34)
MCHC RBC-ENTMCNC: 33.7 GM/DL — SIGNIFICANT CHANGE UP (ref 32–36)
MCV RBC AUTO: 93.6 FL — SIGNIFICANT CHANGE UP (ref 80–100)
NRBC # BLD: 0 /100 WBCS — SIGNIFICANT CHANGE UP (ref 0–0)
PHOSPHATE SERPL-MCNC: 2.2 MG/DL — LOW (ref 2.5–4.5)
PLATELET # BLD AUTO: 208 K/UL — SIGNIFICANT CHANGE UP (ref 150–400)
POTASSIUM SERPL-MCNC: 4.1 MMOL/L — SIGNIFICANT CHANGE UP (ref 3.5–5.3)
POTASSIUM SERPL-SCNC: 4.1 MMOL/L — SIGNIFICANT CHANGE UP (ref 3.5–5.3)
RBC # BLD: 4.5 M/UL — SIGNIFICANT CHANGE UP (ref 4.2–5.8)
RBC # FLD: 13.4 % — SIGNIFICANT CHANGE UP (ref 10.3–14.5)
SODIUM SERPL-SCNC: 143 MMOL/L — SIGNIFICANT CHANGE UP (ref 135–145)
WBC # BLD: 5.17 K/UL — SIGNIFICANT CHANGE UP (ref 3.8–10.5)
WBC # FLD AUTO: 5.17 K/UL — SIGNIFICANT CHANGE UP (ref 3.8–10.5)

## 2021-03-16 RX ORDER — SODIUM,POTASSIUM PHOSPHATES 278-250MG
1 POWDER IN PACKET (EA) ORAL
Refills: 0 | Status: COMPLETED | OUTPATIENT
Start: 2021-03-16 | End: 2021-03-16

## 2021-03-16 RX ORDER — PHENAZOPYRIDINE HCL 100 MG
100 TABLET ORAL EVERY 8 HOURS
Refills: 0 | Status: COMPLETED | OUTPATIENT
Start: 2021-03-16 | End: 2021-03-19

## 2021-03-16 RX ADMIN — Medication 100 MILLIGRAM(S): at 14:17

## 2021-03-16 RX ADMIN — Medication 1 PACKET(S): at 17:42

## 2021-03-16 RX ADMIN — Medication 100 MILLIGRAM(S): at 21:44

## 2021-03-16 RX ADMIN — Medication 1 PACKET(S): at 13:06

## 2021-03-16 RX ADMIN — Medication 6 MILLIGRAM(S): at 07:16

## 2021-03-16 RX ADMIN — PANTOPRAZOLE SODIUM 40 MILLIGRAM(S): 20 TABLET, DELAYED RELEASE ORAL at 07:16

## 2021-03-16 RX ADMIN — ENOXAPARIN SODIUM 40 MILLIGRAM(S): 100 INJECTION SUBCUTANEOUS at 12:00

## 2021-03-16 NOTE — DISCHARGE NOTE PROVIDER - DETAILS OF MALNUTRITION DIAGNOSIS/DIAGNOSES
This patient has been assessed with a concern for Malnutrition and was treated during this hospitalization for the following Nutrition diagnosis/diagnoses:     -  04/01/2021: Severe protein-calorie malnutrition   -  03/20/2021: Moderate protein-calorie malnutrition

## 2021-03-16 NOTE — DISCHARGE NOTE PROVIDER - NSDCCPCAREPLAN_GEN_ALL_CORE_FT
PRINCIPAL DISCHARGE DIAGNOSIS  Diagnosis: Hypoxia  Assessment and Plan of Treatment: Hypoxia is a term used to describe when the level of oxygen circulating in your blood is low.  This can be caused by heart conditions, lung conditions, illness effecting the lungs or breathing, sleep apnea, high altitudes and certain medications.  Symptoms include headache, shortness of breath, coughing, wheezing, confusion, and fast heartbeat.  A late symptom that require an immediate call to 911 include blue skin, lips and/or nails.      SECONDARY DISCHARGE DIAGNOSES  Diagnosis: COVID-19  Assessment and Plan of Treatment: You tested positive for the Coronavirus.  It is imperative that you follow the recommendations outlined below.     PRINCIPAL DISCHARGE DIAGNOSIS  Diagnosis: Acute hypoxemic respiratory failure due to COVID-19  Assessment and Plan of Treatment: Acute hypoxemic respiratory failure due to COVID-19  Due to COVID you had to be intubated. You required a tracheostomy to continue your mechanical ventilation. Current vet settings are 450-18-40% +5 PEEP. Continue daily SBT with pressure support of10-15. SBT continue to fluctuate between minutes to several hours.      SECONDARY DISCHARGE DIAGNOSES  Diagnosis: Anemia  Assessment and Plan of Treatment: Anemia  You received 4U PRBC during your admission. Have your blood count monitored as an outpatient. You should have your blood account checked 7 to 10 days after discharge.    Diagnosis: Acute respiratory distress syndrome (ARDS) due to COVID-19 virus  Assessment and Plan of Treatment: Acute respiratory distress syndrome (ARDS) due to COVID-19 virus  Continue mechanical ventilation with daily SBT trials with pressure support. Current PS caries 10-15.    Diagnosis: Moderate protein-calorie malnutrition  Assessment and Plan of Treatment: Moderate protein-calorie malnutrition  Continue tube feeds and supplementation.    Diagnosis: COVID-19  Assessment and Plan of Treatment: You tested positive for the Coronavirus when admitted to the hospital. You no longer test positive for COVID.    Diagnosis: Pneumothorax on left  Assessment and Plan of Treatment: Pneumothorax on left  You were treated for a pneumothorax during your hospitalization. You required a chest tube. The pneumothorax has since resolved and you no longer require a chest tube.     PRINCIPAL DISCHARGE DIAGNOSIS  Diagnosis: Acute hypoxemic respiratory failure due to COVID-19  Assessment and Plan of Treatment: Acute hypoxemic respiratory failure due to COVID-19  Due to COVID you had to be intubated. You required a tracheostomy to continue your mechanical ventilation. Current vet settings are 450-18-40% +5 PEEP. Continue daily SBT with pressure support of10-15. SBT continue to fluctuate between minutes to several hours.      SECONDARY DISCHARGE DIAGNOSES  Diagnosis: COVID-19  Assessment and Plan of Treatment: You tested positive for the Coronavirus when admitted to the hospital. You no longer test positive for COVID.    Diagnosis: Acute respiratory distress syndrome (ARDS) due to COVID-19 virus  Assessment and Plan of Treatment: Acute respiratory distress syndrome (ARDS) due to COVID-19 virus  Continue mechanical ventilation with daily SBT trials with pressure support. Current PS caries 10-15.    Diagnosis: Moderate protein-calorie malnutrition  Assessment and Plan of Treatment: Moderate protein-calorie malnutrition  Continue tube feeds and supplementation.    Diagnosis: Pneumothorax on left  Assessment and Plan of Treatment: Pneumothorax on left  You were treated for a pneumothorax during your hospitalization. You required a chest tube. The pneumothorax has since resolved and you no longer require a chest tube.    Diagnosis: Anemia  Assessment and Plan of Treatment: Anemia  You received 4U PRBC during your admission. Have your blood count monitored as an outpatient. You should have your blood account checked 7 to 10 days after discharge.

## 2021-03-16 NOTE — DISCHARGE NOTE PROVIDER - PROVIDER TOKENS
FREE:[LAST:[Facility Provider],PHONE:[(   )    -],FAX:[(   )    -]] FREE:[LAST:[Facility Provider],PHONE:[(   )    -],FAX:[(   )    -]],PROVIDER:[TOKEN:[[id number]]]

## 2021-03-16 NOTE — PROGRESS NOTE ADULT - PROBLEM SELECTOR PLAN 1
SPO2 96% on 15L NRB  Pt O2 saturations dropped when attempted to wean this AM  Continue with Dexamethasone (day 3)  + Antibodies  Continue with isolation precaution  Continue with O2 support  Continue with supportive care  Encourage pronning as tolerated  Follow up lab results

## 2021-03-16 NOTE — DISCHARGE NOTE PROVIDER - HOSPITAL COURSE
55 YOM admitted with COVID symptom.  Started on Remdesivir and Dexamethasone.  Remdesivir discontinued secondary to positive antibodies.      Patient requires increased oxygen support.    NOT COMPLETE 55M, no PMH, PSH appy and moody 1990s presented 3/14 with x9 days worsening cough, subjective fevers, and SOB, with x2-3 days dysuria and central, non-radiating, constant CP, admitted to [facility] for management of COVID PNA. Given discomfort of breathing, placed on 4L NC, with improvement. Labs notable for lymphopenia and neutrophilia despite WBC wnl, d-dimer 423, AST//114, lactate 3.3, and . Trop negative x1. +COVID. CXR notable for b/l infiltrates, L>R. Given x1 dose Tylenol and 1L IVF bolus in ED. Initially started on Remdesivir and Dexamethasone, Remdesivir was later discontinued secondary to positive antibodies and elevated LFT's. Not a  candidate for Tociluzimab due to elevated LFT. Patient continued to have respiratory distress requiring 15 L NRB support.    3/19/2021- Patient got transferred to ICU  due to increased work of breathing despite being on 15 L NRB . Patient was on HFNC to keep saturation >90%. Chest X ray showed Grossly stable mild left apical pneumothorax. Stable small pneumomediastinum. Soft tissue emphysema at the neck bases again noted. Stable patchy bilateral pulmonary infiltrates. Thoracic surgery consulted, no intervention at this time.  3/22/2021 - Chest X ray revealed trace ptx right and mild left pneumothorax  3/24/2021 - Overnight pt saturation remains in early 90s and late 80s . Pt remains on HFNC. CXR stable  3/25/2021- Chest X ray showed Overlying chin obscures lung apices. Questionable tiny left apical pneumothorax. Trial of semi- pulse steroid .. solumedrol 250 bid x 3 days   3/28- Started on solumedrol 40 mg bid  3/29/2021 - At 5.30 am, pt was in severe respiratory distress with tachypneic in 40s, tachycardic in 140s, saturating at high 70s, so decision was made to sedate and intubate the pt. Anesthesiologist intubated the patient. A central line, NGT  was placed  4/1/2021- Palliative care consulted. patient's brother/Idalgo ([phone]) has agreed to act as surrogate. Prednisone tapered to  once daily   4/6/2021- Left TLC placed, Hypertensive - s/p Lopressor 5 mg X 1  4/8/2021- Lasix daily and HCTz 50 mg added to aim for negative fluid balance, Left Chest tube placed, added Albumin (4/8-4/10)  4/11/2021-patient was having fever 100.8 rectal, s/p Tylenol , s/p 1 dose of vancomycin, on zosyn - New infiltrate on CXR ,likely developing VAP  4/13/2021- Continue to spike fevers, Zosyn switched to Meropenem. ID- Dr Anand following, new TLC placed  4/15/2021- Chest tuber discontinued   4/18/2021- BP running on lower side, Started on phenylephrine, Ibuprofen D/Connor , FiO2 increased to 70%Patient had large pneumothorax note don Left, thoracic surgery was consulted ,  left chest tube placed.  4/20/2021- Patient received 1 dose of Epifanio for vent asynchrony, A line placed, vent settings changed fro mPC to CVVC     4/21/2021- Trach placed  4/23/2021- PEG tube placed  4/25/2021 - cxr 4/25/21 with A 40% LEFT pneumothorax. LEFT multi-sidehole pigtail catheter overlies LEFT lower hemithorax.. Bilateral multifocal and diffuse ill-defined airspace opacities..  Follow-up AP portable chest radiograph 4/25/2021 AT 8:58 AM: Residual LEFT 30% upper zone pneumothorax.  4/26/2021- Patient noted to have hemoglobin drop to 6.8 from 7.5, will transfuse one unit of PRBC and monitor CBC.   4/27/2021- Patient's hemoglobin dropped again to 7, will give two unit of PRBC  4/28/2021- On 4/28 Hb 6.8, s/p 1 unit of PRBC hb 7.5 now. 4 PRBC total  CTH and CT abdomen w/o contrast no evidence of bleed  No active signs of bleeding (No hematemesis, melena or hematuria)  F/u hemolysis w/u- negative so far, elevated reticulocytes . Dr Andrade consulted  5/2/2021-Patients Hb remained stable, was tachypneic and breathing over the vent so 1 mg push of Dilaudid X 2 was given. V: 20/400/60%/3+ on precedex drip at 1.5.   5/3/2021- patient was found to have large gastric bubble, G tube was connected to wall suction, repeat CXR showed improvement.  5/5/2021 - Hemoglobin dropped from 8.8 to 7.4.Will add bactrim empirically as patient is on chronic prednisone for organizing pneumonia. Tube feeds were held because of diarrhea  5/6/2021- CT chest was done, which showed mild PTX. Left 3rd intercostal space Chest tube was placed. Patient was hypotensive overnight. Cristiano temperature. Was septic, will send cultures if spikes temperature again. Patient was retaining urine, andrea was placed overnight. Will start on albumin and give one dose of lasix as patient was hypotensive and edematous in upper extremities, also had low albumin. Patient was getting agitated, will give ketamine IV push and monitor if it helps.  5/10/2021- Pt tachycardic overnight and hypertensive to 228/116 w/p 1x lopressor. Stopped levophed. Pt became hypotensive. Then started phenylephrine. BP now stable. Will d/c methadone and seroquel and continue on precedex for sedation. CXR showing apical pneumothorax   5/13/2021- Pt tachycardic to 160-170 yesterday afternoon and night. Pupils dilated. Did not respond to increased doses of opiates and benzos. Developed fever to 102.3 overnight with episode of vomiting. UA was positive. started empirically on vanc/zosyn - will d/c for now as no clear source of infection. AM abdominal xray w/ likely ileus. Will restart Free water. Hold Lasix. CT abdomen done showing ileus and possible fecal matter in rectum. Fecal disimpaction performed, small ball of stool retrieved with liquid stool excreted subsequently. Spoke to Surgery RUBI Wood. Tube feeds stopped and PEG tube placed to suction with no output.. Also will restart Precedex as likely in withdrawal and clonid  5/18/2021- Patient was tachy and agitated. Later he had hypotension and bradycardia. He was given 1L bolus and was restarted on pheny after which he improved  5/25- Behavioral health consulted due to difficultyn weaning off sedation . Recommended C/w Klonopin to 1 mg q8h standing (no PRNs), with plan to further taper as tolerated. C/w Seroquel 50 mg BID standing  5/26/2021- BP was elevated, was started on labetalol. d/c andrea.  CT placed on water seal, c/w water seal  5/27/2021- Vomiting X2, held feeding, CT Abdomen/Pelvis ordered  5/28/2021- Placed on trach collar today  5/30/2021- Patient was on trach collar throughout the day yesterday and placed back on vent overnight. No other acute events overnight.    5/31/2021- Decreased Methadone to 5 mg daily and Klonopin to 0.5 mg every 8 hours. Plan was made to transfer patient to SCU  6/1 Abd discomfort. AXR results as above, no bowel obstruction. Resume PEG feedings.   6/1 TC trial. Pt tolerated only 10 minutes, RR ~30's and anxious.  Will attempt SBT with PS.   6/3  Only tolerated 10 minutes of BSBT with PS 5. Became diaphoretic and c/o SOB   6/5  Tolerated 9 hours SBT yesterday and 2.5 hours today with PS 15    NOT COMPLETE 55M, no PMH, PSH appy and moody 1990s presented 3/14 with x9 days worsening cough, subjective fevers, and SOB, with x2-3 days dysuria and central, non-radiating, constant CP, admitted to [facility] for management of COVID PNA. Given discomfort of breathing, placed on 4L NC, with improvement. Labs notable for lymphopenia and neutrophilia despite WBC wnl, d-dimer 423, AST//114, lactate 3.3, and . Trop negative x1. +COVID. CXR notable for b/l infiltrates, L>R. Given x1 dose Tylenol and 1L IVF bolus in ED. Initially started on Remdesivir and Dexamethasone, Remdesivir was later discontinued secondary to positive antibodies and elevated LFT's. Not a  candidate for Tociluzimab due to elevated LFT. Patient continued to have respiratory distress requiring 15 L NRB support.    3/19/2021- Patient got transferred to ICU  due to increased work of breathing despite being on 15 L NRB . Patient was on HFNC to keep saturation >90%. Chest X ray showed Grossly stable mild left apical pneumothorax. Stable small pneumomediastinum. Soft tissue emphysema at the neck bases again noted. Stable patchy bilateral pulmonary infiltrates. Thoracic surgery consulted, no intervention at this time.  3/22/2021 - Chest X ray revealed trace ptx right and mild left pneumothorax  3/24/2021 - Overnight pt saturation remains in early 90s and late 80s . Pt remains on HFNC. CXR stable  3/25/2021- Chest X ray showed Overlying chin obscures lung apices. Questionable tiny left apical pneumothorax. Trial of semi- pulse steroid .. solumedrol 250 bid x 3 days   3/28- Started on solumedrol 40 mg bid  3/29/2021 - At 5.30 am, pt was in severe respiratory distress with tachypneic in 40s, tachycardic in 140s, saturating at high 70s, so decision was made to sedate and intubate the pt. Anesthesiologist intubated the patient. A central line, NGT  was placed  4/1/2021- Palliative care consulted. patient's brother/Idalgo ([phone]) has agreed to act as surrogate. Prednisone tapered to  once daily   4/6/2021- Left TLC placed, Hypertensive - s/p Lopressor 5 mg X 1  4/8/2021- Lasix daily and HCTz 50 mg added to aim for negative fluid balance, Left Chest tube placed, added Albumin (4/8-4/10)  4/11/2021-patient was having fever 100.8 rectal, s/p Tylenol , s/p 1 dose of vancomycin, on zosyn - New infiltrate on CXR ,likely developing VAP  4/13/2021- Continue to spike fevers, Zosyn switched to Meropenem. ID- Dr Anand following, new TLC placed  4/15/2021- Chest tuber discontinued   4/18/2021- BP running on lower side, Started on phenylephrine, Ibuprofen D/Connor , FiO2 increased to 70%Patient had large pneumothorax note don Left, thoracic surgery was consulted ,  left chest tube placed.  4/20/2021- Patient received 1 dose of Epifanio for vent asynchrony, A line placed, vent settings changed fro mPC to CVVC     4/21/2021- Trach placed  4/23/2021- PEG tube placed  4/25/2021 - cxr 4/25/21 with A 40% LEFT pneumothorax. LEFT multi-sidehole pigtail catheter overlies LEFT lower hemithorax.. Bilateral multifocal and diffuse ill-defined airspace opacities..  Follow-up AP portable chest radiograph 4/25/2021 AT 8:58 AM: Residual LEFT 30% upper zone pneumothorax.  4/26/2021- Patient noted to have hemoglobin drop to 6.8 from 7.5, will transfuse one unit of PRBC and monitor CBC.   4/27/2021- Patient's hemoglobin dropped again to 7, will give two unit of PRBC  4/28/2021- On 4/28 Hb 6.8, s/p 1 unit of PRBC hb 7.5 now. 4 PRBC total  CTH and CT abdomen w/o contrast no evidence of bleed  No active signs of bleeding (No hematemesis, melena or hematuria)  F/u hemolysis w/u- negative so far, elevated reticulocytes . Dr Andrade consulted  5/2/2021-Patients Hb remained stable, was tachypneic and breathing over the vent so 1 mg push of Dilaudid X 2 was given. V: 20/400/60%/3+ on precedex drip at 1.5.   5/3/2021- patient was found to have large gastric bubble, G tube was connected to wall suction, repeat CXR showed improvement.  5/5/2021 - Hemoglobin dropped from 8.8 to 7.4.Will add bactrim empirically as patient is on chronic prednisone for organizing pneumonia. Tube feeds were held because of diarrhea  5/6/2021- CT chest was done, which showed mild PTX. Left 3rd intercostal space Chest tube was placed. Patient was hypotensive overnight. Cristiano temperature. Was septic, will send cultures if spikes temperature again. Patient was retaining urine, andrea was placed overnight. Will start on albumin and give one dose of lasix as patient was hypotensive and edematous in upper extremities, also had low albumin. Patient was getting agitated, will give ketamine IV push and monitor if it helps.  5/10/2021- Pt tachycardic overnight and hypertensive to 228/116 w/p 1x lopressor. Stopped levophed. Pt became hypotensive. Then started phenylephrine. BP now stable. Will d/c methadone and seroquel and continue on precedex for sedation. CXR showing apical pneumothorax   5/13/2021- Pt tachycardic to 160-170 yesterday afternoon and night. Pupils dilated. Did not respond to increased doses of opiates and benzos. Developed fever to 102.3 overnight with episode of vomiting. UA was positive. started empirically on vanc/zosyn - will d/c for now as no clear source of infection. AM abdominal xray w/ likely ileus. Will restart Free water. Hold Lasix. CT abdomen done showing ileus and possible fecal matter in rectum. Fecal disimpaction performed, small ball of stool retrieved with liquid stool excreted subsequently. Spoke to Surgery RUBI Wood. Tube feeds stopped and PEG tube placed to suction with no output.. Also will restart Precedex as likely in withdrawal and clonid  5/18/2021- Patient was tachy and agitated. Later he had hypotension and bradycardia. He was given 1L bolus and was restarted on pheny after which he improved  5/25- Behavioral health consulted due to difficultyn weaning off sedation . Recommended C/w Klonopin to 1 mg q8h standing (no PRNs), with plan to further taper as tolerated. C/w Seroquel 50 mg BID standing  5/26/2021- BP was elevated, was started on labetalol. d/c andrea.  CT placed on water seal, c/w water seal  5/27/2021- Vomiting X2, held feeding, CT Abdomen/Pelvis ordered  5/28/2021- Placed on trach collar today  5/30/2021- Patient was on trach collar throughout the day yesterday and placed back on vent overnight. No other acute events overnight.    5/31/2021- Decreased Methadone to 5 mg daily and Klonopin to 0.5 mg every 8 hours. Plan was made to transfer patient to SCU  6/1 Abd discomfort. AXR results as above, no bowel obstruction. Resume PEG feedings.   6/1 TC trial. Pt tolerated only 10 minutes, RR ~30's and anxious.  Will attempt SBT with PS.   6/3  Only tolerated 10 minutes of BSBT with PS 5. Became diaphoretic and c/o SOB   6/5  Tolerated 9 hours SBT yesterday and 2.5 hours today with PS 15     55M, no PMH, PSH appy and moody 1990s who presented 3/14 with x9 days worsening cough, subjective fevers, and SOB, with x2-3 days dysuria and central, non-radiating, constant CP, admitted to [facility] for management of COVID PNA. Given discomfort of breathing, placed on 4L NC, with improvement. Labs notable for lymphopenia and neutrophilia despite WBC wnl, d-dimer 423, AST//114, lactate 3.3, and . Trop negative x1. +COVID. CXR notable for b/l infiltrates, L>R. Given x1 dose Tylenol and 1L IVF bolus in ED. Initially started on Remdesivir and Dexamethasone, Remdesivir was later discontinued secondary to positive antibodies and elevated LFT's. Not a  candidate for Tociluzimab due to elevated LFT. Patient continued to have respiratory distress requiring 15 L NRB support.  3/19/2021- Patient transferred to ICU  due to increased work of breathing despite being on 15 L NRB . Patient was on HFNC to keep saturation >90%. Chest X ray showed Grossly stable mild left apical pneumothorax. Stable small pneumomediastinum. Soft tissue emphysema at the neck bases again noted. Stable patchy bilateral pulmonary infiltrates. Thoracic surgery consulted, no intervention at this time. Chest X ray on 3/22  revealed trace ptx right and mild left pneumothorax. Pt remained on HFNC, IV solumedrol initiated.   Hospital course complicated by severe respiratory distress with hypoxia  tachycardia to 140's, for which pt was sedated and intubated on 3/29 . Left pneumothorax for which left chest tube placed, then discontinued.  Sepsis, for which antibiotics adjusted. ID on board. Hypotension for which phenylephrine initiated. CXR showed large Left PTX. Left chest tube placed. Thoracic surgery on board. Pt unable to wean off ventilator. Surrogate pt's brother Brennan . Palliative care on board.  4/21/2021- Trach placed. 4/23/2021- PEG tube placed  4/25/2021 - cxr 4/25/21 with A 40% LEFT pneumothorax. LEFT multi-sidehole pigtail catheter overlies LEFT lower hemithorax.. Bilateral multifocal and diffuse ill-defined airspace opacities..  Follow-up AP portable chest radiograph 4/25/2021 AT 8:58 AM: Residual LEFT 30% upper zone pneumothorax.    Pt noted with anemia, Hgb downtrended to 6.8-7.0 for which pt was transfused with total of 4 units PRBC.   CTH and CT abdomen w/o contrast no evidence of bleed  No active signs of bleeding (No hematemesis, melena or hematuria).HemeOnc consulted for hemolysis.     w/u- negative so far, elevated reticulocytes . Dr Andrade consulted.  5/3/2021- Patient was found to have large gastric bubble, G tube was connected to wall suction. Tube feeds held due to diarrhea.  repeat CXR showed improvement. Empiric Bactrim initiated in setting of chronic prednisone for organizing pneumonia. 5/6/2021- CT chest was done, which showed mild PTX. Left 3rd intercostal space Chest tube was placed. Urinary retention for which indwelling andrea catheter placed. Pt then passed TOV. Pt developed fever with episode of vomiting. Empiric Zosyn and IV vanco started.  Abdominal xray with likely ileus.  CT abdomen done showing ileus and possible fecal matter in rectum. Fecal disimpaction performed, small ball of stool retrieved with liquid stool excreted subsequently. Surgery consulted. Tube feeds stopped and PEG tube placed to suction with no output.  Precedex restarted as likely in withdrawal. Clonazepam initiated. Seroquel added. Psyche following. Uncontrolled HTN for which labetalol started. Vent weaning as tolerated. Pt transferred to SCU on 5/31. Did not tolerate Trach collar. Tolerating PS 15, from minutes to hours. Methadone and clonazepam tapered. Ileus resolved.     Pt is medically optimized for discharge to vent facility. Family aware. SW facilitating.     This is brief summary of patient's hospitalization. Refer to medical record for complete details of hospital course.      ===========INCOMPLETE==============             55M, no PMH, PSH appy and moody 1990s who presented 3/14 with x9 days worsening cough, subjective fevers, and SOB, with x2-3 days dysuria and central, non-radiating, constant CP, admitted to [facility] for management of COVID PNA. Given discomfort of breathing, placed on 4L NC, with improvement. Labs notable for lymphopenia and neutrophilia despite WBC wnl, d-dimer 423, AST//114, lactate 3.3, and . Trop negative x1. +COVID. CXR notable for b/l infiltrates, L>R. Given x1 dose Tylenol and 1L IVF bolus in ED. Initially started on Remdesivir and Dexamethasone, Remdesivir was later discontinued secondary to positive antibodies and elevated LFT's. Not a  candidate for Tociluzimab due to elevated LFT. Patient continued to have respiratory distress requiring 15 L NRB support.  3/19/2021- Patient transferred to ICU  due to increased work of breathing despite being on 15 L NRB . Patient was on HFNC to keep saturation >90%. Chest X ray showed Grossly stable mild left apical pneumothorax. Stable small pneumomediastinum. Soft tissue emphysema at the neck bases again noted. Stable patchy bilateral pulmonary infiltrates. Thoracic surgery consulted, no intervention at this time. Chest X ray on 3/22  revealed trace ptx right and mild left pneumothorax. Pt remained on HFNC, IV solumedrol initiated.   Hospital course complicated by severe respiratory distress with hypoxia  tachycardia to 140's, for which pt was sedated and intubated on 3/29 . Left pneumothorax for which left chest tube placed, then discontinued.  Sepsis, for which antibiotics adjusted. ID on board. Hypotension for which phenylephrine initiated. CXR showed large Left PTX. Left chest tube placed. Thoracic surgery on board. Pt unable to wean off ventilator. Surrogate pt's brother Brennan . Palliative care on board.  4/21/2021- Trach placed. 4/23/2021- PEG tube placed  4/25/2021 - cxr 4/25/21 with A 40% LEFT pneumothorax. LEFT multi-sidehole pigtail catheter overlies LEFT lower hemithorax.. Bilateral multifocal and diffuse ill-defined airspace opacities..  Follow-up AP portable chest radiograph 4/25/2021 AT 8:58 AM: Residual LEFT 30% upper zone pneumothorax.    Pt noted with anemia, Hgb downtrended to 6.8-7.0 for which pt was transfused with total of 4 units PRBC.   CTH and CT abdomen w/o contrast no evidence of bleed  No active signs of bleeding (No hematemesis, melena or hematuria).HemeOnc consulted for hemolysis.     w/u- negative so far, elevated reticulocytes . Dr Andrade consulted.  5/3/2021- Patient was found to have large gastric bubble, G tube was connected to wall suction. Tube feeds held due to diarrhea.  repeat CXR showed improvement. Empiric Bactrim initiated in setting of chronic prednisone for organizing pneumonia. 5/6/2021- CT chest was done, which showed mild PTX. Left 3rd intercostal space Chest tube was placed. Urinary retention for which indwelling andrea catheter placed. Pt then passed TOV. Pt developed fever with episode of vomiting. Empiric Zosyn and IV vanco started.  Abdominal xray with likely ileus.  CT abdomen done showing ileus and possible fecal matter in rectum. Fecal disimpaction performed, small ball of stool retrieved with liquid stool excreted subsequently. Surgery consulted. Tube feeds stopped and PEG tube placed to suction with no output.  Precedex restarted as likely in withdrawal. Clonazepam initiated. Seroquel added. Psyche following. Uncontrolled HTN for which labetalol started. Vent weaning as tolerated. Pt transferred to SCU on 5/31. Did not tolerate Trach collar. Tolerating PS 15, from minutes to hours. Methadone and clonazepam tapered. Ileus resolved.     Pt is medically optimized for discharge to vent facility. Family aware. SW facilitating.     Of note, consent obtained from pt's brother for COVID 19- Vaccine.      This is brief summary of patient's hospitalization. Refer to medical record for complete details of hospital course.

## 2021-03-16 NOTE — DISCHARGE NOTE PROVIDER - CARE PROVIDER_API CALL
Facility Provider,   Phone: (   )    -  Fax: (   )    -  Follow Up Time:    Facility Provider,   Phone: (   )    -  Fax: (   )    -  Follow Up Time:     CHRISTOPHER SPEARS  [id number]  [address] [place] [address]  Phone: [phone]  Fax: [phone]  Follow Up Time:

## 2021-03-16 NOTE — DISCHARGE NOTE PROVIDER - NSDCFUADDINST_GEN_ALL_CORE_FT
CORONAVIRUS INSTRUCTIONS:  Based on your current clinical status and stability, it has been determined that you no longer need hospitalization and can recover while remaining in self-quarantine at home. You should follow the prevention steps below until a healthcare provider or local or state health department says you can return to your normal activities.     1. You should restrict activities outside your home, except for getting medical care.   2. Do not go to work, school, or public areas.   3. Avoid using public transportation, ride-sharing, or taxis.   4. Separate yourself from other people and animals in your home as much as possible.  When you are around other people (e.g., sharing a room or vehicle) you should wear a facemask.  5. Wash your hands often with soap and water for at least 20 seconds, especially after blowing your nose, coughing, or sneezing; going to the bathroom; and before eating or preparing food.  6. Cover your mouth and nose with a tissue when you cough or sneeze. Throw used tissues in a lined trash can. Immediately wash your hands with soap and water for at least 20 seconds  7. High touch surfaces include counters, tabletops, doorknobs, bathroom fixtures, toilets, phones, keyboards, tablets, and bedside tables.  8. Avoid sharing dishes, drinking glasses, cups, eating utensils, towels, or bedding with other people or pets in your home. After using these items, they should be washed thoroughly with soap and water.  You are strongly advised to seek prompt medical attention if your illness worsens or you develop new symptoms like fever or difficulty breathing.     Please call  [phone] to speak with your discharging physician if you have any questions.  Mechanical Vent- AC/CMV   Target Spo2 88-97   Resp 18 Fio2 40%  PEEP/CPAP 5      You vaccinated today with [employer] single dose COVID-19 Vaccine today prior to discharge.  Report any changes in your condition such as fever, chills, temperature of 101.0F or higher, chest pain or shortness of breath, body rash.   Mechanical Vent- AC/CMV   Target Spo2 88-97   Resp 18 Fio2 40%  PEEP/CPAP 5.    Apply bacitracin daily to abdominal wound and cover with dry gauze.          You vaccinated today with [employer] single dose COVID-19 Vaccine today prior to discharge.  Report any changes in your condition such as fever, chills, temperature of 101.0F or higher, chest pain or shortness of breath, body rash.

## 2021-03-16 NOTE — PROGRESS NOTE ADULT - SUBJECTIVE AND OBJECTIVE BOX
Pt is awake, alert, lying in bed in NAD. Feels well. Resting comfortably in bed. Denies cough/SOB.     INTERVAL HPI/OVERNIGHT EVENTS:      VITAL SIGNS:  T(F): 97.8 (21 @ 05:05)  HR: 68 (21 @ 05:05)  BP: 144/69 (21 @ 05:05)  RR: 20 (21 @ 05:05)  SpO2: 96% (21 @ 05:05)  Wt(kg): --  I&O's Detail          REVIEW OF SYSTEMS:    CONSTITUTIONAL:  No fevers, chills, sweats    HEENT:  Eyes:  No diplopia or blurred vision. ENT:  No earache, sore throat or runny nose.    CARDIOVASCULAR:  No pressure, squeezing, tightness, or heaviness about the chest; no palpitations.    RESPIRATORY:  Per HPI    GASTROINTESTINAL:  No abdominal pain, nausea, vomiting or diarrhea.    GENITOURINARY:  No dysuria, frequency or urgency.    NEUROLOGIC:  No paresthesias, fasciculations, seizures or weakness.    PSYCHIATRIC:  No disorder of thought or mood.      PHYSICAL EXAM:    Constitutional: Well developed and nourished  Eyes:Perrla  ENMT: normal  Neck:supple  Respiratory: good air entry  Cardiovascular: S1 S2 regular  Gastrointestinal: Soft, Non tender  Extremities: No edema  Vascular:normal  Neurological:Awake, alert,Ox3  Musculoskeletal:Normal      MEDICATIONS  (STANDING):  dexAMETHasone     Tablet 6 milliGRAM(s) Oral daily  enoxaparin Injectable 40 milliGRAM(s) SubCutaneous daily  insulin lispro (ADMELOG) corrective regimen sliding scale   SubCutaneous three times a day before meals  pantoprazole    Tablet 40 milliGRAM(s) Oral before breakfast    MEDICATIONS  (PRN):  acetaminophen   Tablet .. 650 milliGRAM(s) Oral every 6 hours PRN Temp greater or equal to 38C (100.4F), Moderate Pain (4 - 6)  ALBUTerol    90 MICROgram(s) HFA Inhaler 2 Puff(s) Inhalation every 6 hours PRN Shortness of Breath and/or Wheezing  guaiFENesin   Syrup  (Sugar-Free) 100 milliGRAM(s) Oral every 6 hours PRN Cough      Allergies    No Known Allergies    Intolerances        LABS:                        14.2   5.17  )-----------( 208      ( 16 Mar 2021 11:00 )             42.1     03-16    143  |  111<H>  |  24<H>  ----------------------------<  169<H>  4.1   |  25  |  0.86    Ca    8.3<L>      16 Mar 2021 11:00  Phos  2.2     03-16  Mg     2.6     03-16    TPro  6.1  /  Alb  2.3<L>  /  TBili  0.6  /  DBili  x   /  AST  147<H>  /  ALT  170<H>  /  AlkPhos  72  03-15      Urinalysis Basic - ( 14 Mar 2021 17:56 )    Color: Yellow / Appearance: Clear / S.015 / pH: x  Gluc: x / Ketone: Negative  / Bili: Negative / Urobili: 1   Blood: x / Protein: 100 / Nitrite: Negative   Leuk Esterase: Negative / RBC: 0-2 /HPF / WBC 0-2 /HPF   Sq Epi: x / Non Sq Epi: Few /HPF / Bacteria: Few /HPF        CARDIAC MARKERS ( 15 Mar 2021 05:38 )  <0.015 ng/mL / x     / x     / x     / x      CARDIAC MARKERS ( 14 Mar 2021 21:34 )  <0.015 ng/mL / x     / x     / x     / x      CARDIAC MARKERS ( 14 Mar 2021 13:21 )  <0.015 ng/mL / x     / 238 U/L / x     / x          CAPILLARY BLOOD GLUCOSE        pro-bnp --  @ 13:21     d-dimer 423   @ 13:21      RADIOLOGY & ADDITIONAL TESTS:    CXR:  < from: Xray Chest 1 View-PORTABLE IMMEDIATE (21 @ 13:14) >  Findings/  Impression: The heart is unremarkable. Large airspace opacity left mid and lower lung.    < end of copied text >    Ct scan chest:    ekg;    echo:

## 2021-03-16 NOTE — DISCHARGE NOTE PROVIDER - NSDCMRMEDTOKEN_GEN_ALL_CORE_FT
acetaminophen 160 mg/5 mL oral suspension: 20.31 milliliter(s) orally every 12 hours, As needed, Temp greater or equal to 38C (100.4F), Mild Pain (1 - 3)  albuterol 90 mcg/inh inhalation aerosol: 2 puff(s) inhaled every 6 hours, As needed, Shortness of Breath and/or Wheezing  ascorbic acid 1000 mg oral tablet: 1 tab(s) orally once a day  bacitracin 500 units/g topical ointment: 1 application topically 2 times a day  bisacodyl 10 mg rectal suppository: 1 suppository(ies) rectal once a day, As needed, Constipation  calcium-vitamin D 500 mg-200 intl units (5 mcg) oral tablet: 1 tab(s) orally once a day  chlorhexidine 2% topical pad: 1 application topically   cholecalciferol oral tablet: 1000 unit(s) orally once a day  clonazePAM 0.5 mg oral tablet: 1 tab(s) orally every 12 hours  gabapentin 100 mg oral capsule: 1 cap(s) orally every 8 hours  labetalol 100 mg oral tablet: 1 tab(s) orally 2 times a day  ocular lubricant ophthalmic solution: 1 drop(s) to each affected eye every 4 hours, As needed, Dry Eyes  ondansetron 2 mg/mL injectable solution:  injectable   pantoprazole 40 mg intravenous injection: 40 milligram(s) intravenous once a day  polyethylene glycol 3350 oral powder for reconstitution: 17 gram(s) orally once a day, As needed, Constipation  predniSONE 10 mg oral tablet: 1 tab(s) orally once a day- for 10 days starting 6/12  predniSONE 5 mg oral tablet: 1 tab(s) orally once a day-starting on 6/22 until 7/1  QUEtiapine 50 mg oral tablet: 1 tab(s) orally 2 times a day  senna oral tablet: 2 tab(s) orally once a day (at bedtime)  sulfamethoxazole-trimethoprim 200 mg-40 mg/5 mL oral suspension:  Peg- TIW- 06 AM-Mon, Wed, Fri

## 2021-03-16 NOTE — PROGRESS NOTE ADULT - SUBJECTIVE AND OBJECTIVE BOX
Patient is a 55y old  Male who presents with a chief complaint of SOB (15 Mar 2021 11:15)    pt seen in ed [x  ], reg med floor [   ], bed [ x ], chair at bedside [   ], a+o x3 [ x ], lethargic [  ],    nad [ x ]      Allergies    No Known Allergies        Vitals    T(F): 97.8 (03-16-21 @ 05:05), Max: 101.1 (03-15-21 @ 08:24)  HR: 68 (03-16-21 @ 05:05) (60 - 81)  BP: 144/69 (03-16-21 @ 05:05) (115/67 - 148/60)  RR: 20 (03-16-21 @ 05:05) (17 - 20)  SpO2: 96% (03-16-21 @ 05:05) (94% - 96%)  Wt(kg): --  CAPILLARY BLOOD GLUCOSE          Labs                          13.7   6.84  )-----------( 159      ( 15 Mar 2021 05:38 )             40.8       03-15    140  |  106  |  15  ----------------------------<  104<H>  3.7   |  25  |  0.81    Ca    7.6<L>      15 Mar 2021 05:38  Phos  2.9     03-15  Mg     2.3     03-15    TPro  6.1  /  Alb  2.3<L>  /  TBili  0.6  /  DBili  x   /  AST  147<H>  /  ALT  170<H>  /  AlkPhos  72  03-15      CARDIAC MARKERS ( 15 Mar 2021 05:38 )  <0.015 ng/mL / x     / x     / x     / x      CARDIAC MARKERS ( 14 Mar 2021 21:34 )  <0.015 ng/mL / x     / x     / x     / x      CARDIAC MARKERS ( 14 Mar 2021 13:21 )  <0.015 ng/mL / x     / 238 U/L / x     / x            .Urine Clean Catch (Midstream)  03-15 @ 00:52   No growth  --  --          Radiology Results      Meds    MEDICATIONS  (STANDING):  dexAMETHasone     Tablet 6 milliGRAM(s) Oral daily  enoxaparin Injectable 40 milliGRAM(s) SubCutaneous daily  insulin lispro (ADMELOG) corrective regimen sliding scale   SubCutaneous three times a day before meals  pantoprazole    Tablet 40 milliGRAM(s) Oral before breakfast      MEDICATIONS  (PRN):  acetaminophen   Tablet .. 650 milliGRAM(s) Oral every 6 hours PRN Temp greater or equal to 38C (100.4F), Moderate Pain (4 - 6)  ALBUTerol    90 MICROgram(s) HFA Inhaler 2 Puff(s) Inhalation every 6 hours PRN Shortness of Breath and/or Wheezing  guaiFENesin   Syrup  (Sugar-Free) 100 milliGRAM(s) Oral every 6 hours PRN Cough      Physical Exam    Neuro :  no focal deficits  Respiratory: CTA B/L  CV: RRR, S1S2, no murmurs,   Abdominal: Soft, NT, ND +BS,  Extremities: No edema, + peripheral pulses    ASSESSMENT    Hypoxemia 2nd to covid pna  h/o appendectomy  cholecystectomy        PLAN    contact and airborne isolation  d/c remdesevir given covid ab positive noted above  cont dexamethasone  pepcid, singulair, vit c, vit d, zinc  cont albuterol inhaler   pulm cons  procalcitonin, D-dimer, crp, ldh, ferritin, lactate noted above,    cont tylenol prn,   cont robitussin prn   O2 sat (88% - 98%) on nrb 15L  O2 via nrbm and taper as tolerated  cont current meds     Patient is a 55y old  Male who presents with a chief complaint of SOB (15 Mar 2021 11:15)    pt seen in ed [  ], reg med floor [  x ], bed [ x ], chair at bedside [   ], a+o x3 [ x ], lethargic [  ],    nad [ x ]      Allergies    No Known Allergies        Vitals    T(F): 97.8 (03-16-21 @ 05:05), Max: 101.1 (03-15-21 @ 08:24)  HR: 68 (03-16-21 @ 05:05) (60 - 81)  BP: 144/69 (03-16-21 @ 05:05) (115/67 - 148/60)  RR: 20 (03-16-21 @ 05:05) (17 - 20)  SpO2: 96% (03-16-21 @ 05:05) (94% - 96%)  Wt(kg): --  CAPILLARY BLOOD GLUCOSE          Labs                          13.7   6.84  )-----------( 159      ( 15 Mar 2021 05:38 )             40.8       03-15    140  |  106  |  15  ----------------------------<  104<H>  3.7   |  25  |  0.81    Ca    7.6<L>      15 Mar 2021 05:38  Phos  2.9     03-15  Mg     2.3     03-15    TPro  6.1  /  Alb  2.3<L>  /  TBili  0.6  /  DBili  x   /  AST  147<H>  /  ALT  170<H>  /  AlkPhos  72  03-15      CARDIAC MARKERS ( 15 Mar 2021 05:38 )  <0.015 ng/mL / x     / x     / x     / x      CARDIAC MARKERS ( 14 Mar 2021 21:34 )  <0.015 ng/mL / x     / x     / x     / x      CARDIAC MARKERS ( 14 Mar 2021 13:21 )  <0.015 ng/mL / x     / 238 U/L / x     / x            .Urine Clean Catch (Midstream)  03-15 @ 00:52   No growth  --  --          Radiology Results      Meds    MEDICATIONS  (STANDING):  dexAMETHasone     Tablet 6 milliGRAM(s) Oral daily  enoxaparin Injectable 40 milliGRAM(s) SubCutaneous daily  insulin lispro (ADMELOG) corrective regimen sliding scale   SubCutaneous three times a day before meals  pantoprazole    Tablet 40 milliGRAM(s) Oral before breakfast      MEDICATIONS  (PRN):  acetaminophen   Tablet .. 650 milliGRAM(s) Oral every 6 hours PRN Temp greater or equal to 38C (100.4F), Moderate Pain (4 - 6)  ALBUTerol    90 MICROgram(s) HFA Inhaler 2 Puff(s) Inhalation every 6 hours PRN Shortness of Breath and/or Wheezing  guaiFENesin   Syrup  (Sugar-Free) 100 milliGRAM(s) Oral every 6 hours PRN Cough      Physical Exam    Neuro :  no focal deficits  Respiratory: CTA B/L  CV: RRR, S1S2, no murmurs,   Abdominal: Soft, NT, ND +BS,  Extremities: No edema, + peripheral pulses    ASSESSMENT    Hypoxemia 2nd to covid pna  h/o appendectomy  cholecystectomy        PLAN    contact and airborne isolation  d/c remdesevir given covid ab positive noted   cont dexamethasone  pepcid, singulair, vit c, vit d, zinc  cont albuterol inhaler   pulm f/u  procalcitonin, D-dimer, crp, ldh, ferritin, lactate noted ,    cont tylenol prn,   cont robitussin prn   O2 sat (94% - 96%) on nrb 15L  O2 via nrbm and taper as tolerated  cont current meds

## 2021-03-17 DIAGNOSIS — R79.89 OTHER SPECIFIED ABNORMAL FINDINGS OF BLOOD CHEMISTRY: ICD-10-CM

## 2021-03-17 DIAGNOSIS — U07.1 COVID-19: ICD-10-CM

## 2021-03-17 LAB
ALBUMIN SERPL ELPH-MCNC: 2.4 G/DL — LOW (ref 3.5–5)
ALP SERPL-CCNC: 78 U/L — SIGNIFICANT CHANGE UP (ref 40–120)
ALT FLD-CCNC: 177 U/L DA — HIGH (ref 10–60)
ANION GAP SERPL CALC-SCNC: 10 MMOL/L — SIGNIFICANT CHANGE UP (ref 5–17)
AST SERPL-CCNC: 86 U/L — HIGH (ref 10–40)
BILIRUB DIRECT SERPL-MCNC: 0.2 MG/DL — SIGNIFICANT CHANGE UP (ref 0–0.2)
BILIRUB INDIRECT FLD-MCNC: 0.4 MG/DL — SIGNIFICANT CHANGE UP (ref 0.2–1)
BILIRUB SERPL-MCNC: 0.6 MG/DL — SIGNIFICANT CHANGE UP (ref 0.2–1.2)
BUN SERPL-MCNC: 26 MG/DL — HIGH (ref 7–18)
CALCIUM SERPL-MCNC: 8.3 MG/DL — LOW (ref 8.4–10.5)
CHLORIDE SERPL-SCNC: 109 MMOL/L — HIGH (ref 96–108)
CO2 SERPL-SCNC: 22 MMOL/L — SIGNIFICANT CHANGE UP (ref 22–31)
CREAT SERPL-MCNC: 0.78 MG/DL — SIGNIFICANT CHANGE UP (ref 0.5–1.3)
CRP SERPL-MCNC: 49 MG/L — HIGH
D DIMER BLD IA.RAPID-MCNC: 151 NG/ML DDU — SIGNIFICANT CHANGE UP
FERRITIN SERPL-MCNC: 2557 NG/ML — HIGH (ref 30–400)
GLUCOSE BLDC GLUCOMTR-MCNC: 110 MG/DL — HIGH (ref 70–99)
GLUCOSE BLDC GLUCOMTR-MCNC: 121 MG/DL — HIGH (ref 70–99)
GLUCOSE BLDC GLUCOMTR-MCNC: 133 MG/DL — HIGH (ref 70–99)
GLUCOSE BLDC GLUCOMTR-MCNC: 148 MG/DL — HIGH (ref 70–99)
GLUCOSE SERPL-MCNC: 128 MG/DL — HIGH (ref 70–99)
HCT VFR BLD CALC: 41.1 % — SIGNIFICANT CHANGE UP (ref 39–50)
HGB BLD-MCNC: 13.8 G/DL — SIGNIFICANT CHANGE UP (ref 13–17)
LDH SERPL L TO P-CCNC: 489 U/L — HIGH (ref 120–225)
MAGNESIUM SERPL-MCNC: 2.6 MG/DL — SIGNIFICANT CHANGE UP (ref 1.6–2.6)
MCHC RBC-ENTMCNC: 31.2 PG — SIGNIFICANT CHANGE UP (ref 27–34)
MCHC RBC-ENTMCNC: 33.6 GM/DL — SIGNIFICANT CHANGE UP (ref 32–36)
MCV RBC AUTO: 92.8 FL — SIGNIFICANT CHANGE UP (ref 80–100)
NRBC # BLD: 0 /100 WBCS — SIGNIFICANT CHANGE UP (ref 0–0)
PHOSPHATE SERPL-MCNC: 2.8 MG/DL — SIGNIFICANT CHANGE UP (ref 2.5–4.5)
PLATELET # BLD AUTO: 234 K/UL — SIGNIFICANT CHANGE UP (ref 150–400)
POTASSIUM SERPL-MCNC: 3.9 MMOL/L — SIGNIFICANT CHANGE UP (ref 3.5–5.3)
POTASSIUM SERPL-SCNC: 3.9 MMOL/L — SIGNIFICANT CHANGE UP (ref 3.5–5.3)
PROT SERPL-MCNC: 6.1 G/DL — SIGNIFICANT CHANGE UP (ref 6–8.3)
RBC # BLD: 4.43 M/UL — SIGNIFICANT CHANGE UP (ref 4.2–5.8)
RBC # FLD: 13 % — SIGNIFICANT CHANGE UP (ref 10.3–14.5)
SODIUM SERPL-SCNC: 141 MMOL/L — SIGNIFICANT CHANGE UP (ref 135–145)
WBC # BLD: 9.11 K/UL — SIGNIFICANT CHANGE UP (ref 3.8–10.5)
WBC # FLD AUTO: 9.11 K/UL — SIGNIFICANT CHANGE UP (ref 3.8–10.5)

## 2021-03-17 RX ORDER — ZINC SULFATE TAB 220 MG (50 MG ZINC EQUIVALENT) 220 (50 ZN) MG
220 TAB ORAL DAILY
Refills: 0 | Status: DISCONTINUED | OUTPATIENT
Start: 2021-03-17 | End: 2021-03-19

## 2021-03-17 RX ORDER — ASCORBIC ACID 60 MG
1000 TABLET,CHEWABLE ORAL DAILY
Refills: 0 | Status: DISCONTINUED | OUTPATIENT
Start: 2021-03-17 | End: 2021-04-23

## 2021-03-17 RX ORDER — CHOLECALCIFEROL (VITAMIN D3) 125 MCG
2000 CAPSULE ORAL DAILY
Refills: 0 | Status: DISCONTINUED | OUTPATIENT
Start: 2021-03-17 | End: 2021-03-19

## 2021-03-17 RX ORDER — MONTELUKAST 4 MG/1
10 TABLET, CHEWABLE ORAL AT BEDTIME
Refills: 0 | Status: DISCONTINUED | OUTPATIENT
Start: 2021-03-17 | End: 2021-03-19

## 2021-03-17 RX ADMIN — PANTOPRAZOLE SODIUM 40 MILLIGRAM(S): 20 TABLET, DELAYED RELEASE ORAL at 05:47

## 2021-03-17 RX ADMIN — Medication 100 MILLIGRAM(S): at 21:18

## 2021-03-17 RX ADMIN — ENOXAPARIN SODIUM 40 MILLIGRAM(S): 100 INJECTION SUBCUTANEOUS at 11:17

## 2021-03-17 RX ADMIN — MONTELUKAST 10 MILLIGRAM(S): 4 TABLET, CHEWABLE ORAL at 21:18

## 2021-03-17 RX ADMIN — Medication 2000 UNIT(S): at 11:17

## 2021-03-17 RX ADMIN — ZINC SULFATE TAB 220 MG (50 MG ZINC EQUIVALENT) 220 MILLIGRAM(S): 220 (50 ZN) TAB at 11:18

## 2021-03-17 RX ADMIN — Medication 100 MILLIGRAM(S): at 05:44

## 2021-03-17 RX ADMIN — Medication 1000 MILLIGRAM(S): at 11:17

## 2021-03-17 RX ADMIN — Medication 6 MILLIGRAM(S): at 05:44

## 2021-03-17 RX ADMIN — Medication 100 MILLIGRAM(S): at 16:49

## 2021-03-17 RX ADMIN — Medication 100 MILLIGRAM(S): at 05:45

## 2021-03-17 NOTE — PROGRESS NOTE ADULT - SUBJECTIVE AND OBJECTIVE BOX
Patient is a 55y old  Male who presents with a chief complaint of SOB (16 Mar 2021 18:46)    pt seen in ed [  ], reg med floor [  x ], bed [ x ], chair at bedside [   ], a+o x3 [ x ], lethargic [  ],    nad [ x ]      Allergies    No Known Allergies        Vitals    T(F): 98.1 (03-17-21 @ 04:59), Max: 98.1 (03-17-21 @ 04:59)  HR: 58 (03-17-21 @ 04:59) (58 - 65)  BP: 132/69 (03-17-21 @ 04:59) (127/74 - 135/69)  RR: 17 (03-17-21 @ 04:59) (17 - 18)  SpO2: 95% (03-17-21 @ 04:59) (94% - 97%)  Wt(kg): --  CAPILLARY BLOOD GLUCOSE      POCT Blood Glucose.: 145 mg/dL (16 Mar 2021 22:07)      Labs                          14.2   5.17  )-----------( 208      ( 16 Mar 2021 11:00 )             42.1       03-16    143  |  111<H>  |  24<H>  ----------------------------<  169<H>  4.1   |  25  |  0.86    Ca    8.3<L>      16 Mar 2021 11:00  Phos  2.2     03-16  Mg     2.6     03-16              .Urine Clean Catch (Midstream)  03-15 @ 00:52   No growth  --  --          Radiology Results      Meds    MEDICATIONS  (STANDING):  dexAMETHasone     Tablet 6 milliGRAM(s) Oral daily  enoxaparin Injectable 40 milliGRAM(s) SubCutaneous daily  insulin lispro (ADMELOG) corrective regimen sliding scale   SubCutaneous three times a day before meals  pantoprazole    Tablet 40 milliGRAM(s) Oral before breakfast  phenazopyridine 100 milliGRAM(s) Oral every 8 hours      MEDICATIONS  (PRN):  acetaminophen   Tablet .. 650 milliGRAM(s) Oral every 6 hours PRN Temp greater or equal to 38C (100.4F), Moderate Pain (4 - 6)  ALBUTerol    90 MICROgram(s) HFA Inhaler 2 Puff(s) Inhalation every 6 hours PRN Shortness of Breath and/or Wheezing  guaiFENesin   Syrup  (Sugar-Free) 100 milliGRAM(s) Oral every 6 hours PRN Cough      Physical Exam    Neuro :  no focal deficits  Respiratory: CTA B/L  CV: RRR, S1S2, no murmurs,   Abdominal: Soft, NT, ND +BS,  Extremities: No edema, + peripheral pulses    ASSESSMENT    Hypoxemia 2nd to covid pna  h/o appendectomy  cholecystectomy        PLAN    contact and airborne isolation  d/c remdesevir given covid ab positive noted   cont dexamethasone  pepcid, singulair, vit c, vit d, zinc  cont albuterol inhaler   pulm f/u  procalcitonin, D-dimer, crp, ldh, ferritin, lactate noted ,    cont tylenol prn,   cont robitussin prn   O2 sat (94% - 96%) on nrb 15L  O2 via nrbm and taper as tolerated  cont current meds         Patient is a 55y old  Male who presents with a chief complaint of SOB (16 Mar 2021 18:46)    pt seen in ed [  ], reg med floor [  x ], bed [ x ], chair at bedside [   ], a+o x3 [ x ], lethargic [  ],    nad [ x ]      Allergies    No Known Allergies        Vitals    T(F): 98.1 (03-17-21 @ 04:59), Max: 98.1 (03-17-21 @ 04:59)  HR: 58 (03-17-21 @ 04:59) (58 - 65)  BP: 132/69 (03-17-21 @ 04:59) (127/74 - 135/69)  RR: 17 (03-17-21 @ 04:59) (17 - 18)  SpO2: 95% (03-17-21 @ 04:59) (94% - 97%)  Wt(kg): --  CAPILLARY BLOOD GLUCOSE      POCT Blood Glucose.: 145 mg/dL (16 Mar 2021 22:07)      Labs                          14.2   5.17  )-----------( 208      ( 16 Mar 2021 11:00 )             42.1       03-16    143  |  111<H>  |  24<H>  ----------------------------<  169<H>  4.1   |  25  |  0.86    Ca    8.3<L>      16 Mar 2021 11:00  Phos  2.2     03-16  Mg     2.6     03-16              .Urine Clean Catch (Midstream)  03-15 @ 00:52   No growth  --  --          Radiology Results      Meds    MEDICATIONS  (STANDING):  dexAMETHasone     Tablet 6 milliGRAM(s) Oral daily  enoxaparin Injectable 40 milliGRAM(s) SubCutaneous daily  insulin lispro (ADMELOG) corrective regimen sliding scale   SubCutaneous three times a day before meals  pantoprazole    Tablet 40 milliGRAM(s) Oral before breakfast  phenazopyridine 100 milliGRAM(s) Oral every 8 hours      MEDICATIONS  (PRN):  acetaminophen   Tablet .. 650 milliGRAM(s) Oral every 6 hours PRN Temp greater or equal to 38C (100.4F), Moderate Pain (4 - 6)  ALBUTerol    90 MICROgram(s) HFA Inhaler 2 Puff(s) Inhalation every 6 hours PRN Shortness of Breath and/or Wheezing  guaiFENesin   Syrup  (Sugar-Free) 100 milliGRAM(s) Oral every 6 hours PRN Cough      Physical Exam    Neuro :  no focal deficits  Respiratory: CTA B/L  CV: RRR, S1S2, no murmurs,   Abdominal: Soft, NT, ND +BS,  Extremities: No edema, + peripheral pulses    ASSESSMENT    Hypoxemia 2nd to covid pna  h/o appendectomy  cholecystectomy        PLAN    contact and airborne isolation  d/c remdesevir given covid ab positive noted   cont dexamethasone  pepcid, singulair, vit c, vit d, zinc  cont albuterol inhaler   pulm f/u  procalcitonin, D-dimer, crp, ldh, ferritin, lactate noted ,    cont tylenol prn,   cont robitussin prn   O2 sat (94% - 97%) on nrb 15L  O2 via nrbm and taper as tolerated  cont current meds

## 2021-03-17 NOTE — PROGRESS NOTE ADULT - SUBJECTIVE AND OBJECTIVE BOX
NP Note discussed with  Primary Attending    Patient is a 55y old  Male who presents with a chief complaint of SOB (17 Mar 2021 06:46)      INTERVAL HPI/OVERNIGHT EVENTS: no new complaints    MEDICATIONS  (STANDING):  ascorbic acid 1000 milliGRAM(s) Oral daily  cholecalciferol 2000 Unit(s) Oral daily  dexAMETHasone     Tablet 6 milliGRAM(s) Oral daily  enoxaparin Injectable 40 milliGRAM(s) SubCutaneous daily  insulin lispro (ADMELOG) corrective regimen sliding scale   SubCutaneous three times a day before meals  montelukast 10 milliGRAM(s) Oral at bedtime  pantoprazole    Tablet 40 milliGRAM(s) Oral before breakfast  phenazopyridine 100 milliGRAM(s) Oral every 8 hours  zinc sulfate 220 milliGRAM(s) Oral daily    MEDICATIONS  (PRN):  acetaminophen   Tablet .. 650 milliGRAM(s) Oral every 6 hours PRN Temp greater or equal to 38C (100.4F), Moderate Pain (4 - 6)  ALBUTerol    90 MICROgram(s) HFA Inhaler 2 Puff(s) Inhalation every 6 hours PRN Shortness of Breath and/or Wheezing  guaiFENesin   Syrup  (Sugar-Free) 100 milliGRAM(s) Oral every 6 hours PRN Cough      __________________________________________________  REVIEW OF SYSTEMS:    CONSTITUTIONAL: No fever,   EYES: no acute visual disturbances  NECK: No pain or stiffness  RESPIRATORY: No cough; No shortness of breath  CARDIOVASCULAR: No chest pain, no palpitations  GASTROINTESTINAL: No pain. No nausea or vomiting; No diarrhea   NEUROLOGICAL: No headache or numbness, no tremors  MUSCULOSKELETAL: No joint pain, no muscle pain  GENITOURINARY: no dysuria, no frequency, no hesitancy  PSYCHIATRY: no depression , no anxiety  ALL OTHER  ROS negative        Vital Signs Last 24 Hrs  T(C): 36.7 (17 Mar 2021 04:59), Max: 36.7 (17 Mar 2021 04:59)  T(F): 98.1 (17 Mar 2021 04:59), Max: 98.1 (17 Mar 2021 04:59)  HR: 58 (17 Mar 2021 04:59) (58 - 65)  BP: 132/69 (17 Mar 2021 04:59) (127/74 - 135/69)  BP(mean): --  RR: 17 (17 Mar 2021 04:59) (17 - 18)  SpO2: 95% (17 Mar 2021 04:59) (94% - 97%)    ________________________________________________  PHYSICAL EXAM:  GENERAL: NAD  HEENT: Normocephalic;  conjunctivae and sclerae clear; moist mucous membranes;   NECK : supple  CHEST/LUNG: Clear to auscultation bilaterally with good air entry   HEART: S1 S2  regular; no murmurs, gallops or rubs  ABDOMEN: Soft, Nontender, Nondistended; Bowel sounds present  EXTREMITIES: no cyanosis; no edema; no calf tenderness  SKIN: warm and dry; no rash  NERVOUS SYSTEM:  Awake and alert; Oriented  to place, person and time ; no new deficits    _________________________________________________  LABS:                        13.8   9.11  )-----------( 234      ( 17 Mar 2021 07:01 )             41.1     03-17    141  |  109<H>  |  26<H>  ----------------------------<  128<H>  3.9   |  22  |  0.78    Ca    8.3<L>      17 Mar 2021 07:01  Phos  2.8     03-17  Mg     2.6     03-17          CAPILLARY BLOOD GLUCOSE      POCT Blood Glucose.: 110 mg/dL (17 Mar 2021 08:10)  POCT Blood Glucose.: 145 mg/dL (16 Mar 2021 22:07)  POCT Blood Glucose.: 139 mg/dL (16 Mar 2021 17:49)  POCT Blood Glucose.: 147 mg/dL (16 Mar 2021 11:39)        RADIOLOGY & ADDITIONAL TESTS:    Imaging Personally Reviewed:  YES/NO    Consultant(s) Notes Reviewed:   YES/ No    Care Discussed with Consultants :     Plan of care was discussed with patient and /or primary care giver; all questions and concerns were addressed and care was aligned with patient's wishes.     NP Note discussed with  Primary Attending    Patient is a 55y old  Male who presents with a chief complaint of SOB (17 Mar 2021 06:46)      INTERVAL HPI/OVERNIGHT EVENTS: no new complaints    MEDICATIONS  (STANDING):  ascorbic acid 1000 milliGRAM(s) Oral daily  cholecalciferol 2000 Unit(s) Oral daily  dexAMETHasone     Tablet 6 milliGRAM(s) Oral daily  enoxaparin Injectable 40 milliGRAM(s) SubCutaneous daily  insulin lispro (ADMELOG) corrective regimen sliding scale   SubCutaneous three times a day before meals  montelukast 10 milliGRAM(s) Oral at bedtime  pantoprazole    Tablet 40 milliGRAM(s) Oral before breakfast  phenazopyridine 100 milliGRAM(s) Oral every 8 hours  zinc sulfate 220 milliGRAM(s) Oral daily    MEDICATIONS  (PRN):  acetaminophen   Tablet .. 650 milliGRAM(s) Oral every 6 hours PRN Temp greater or equal to 38C (100.4F), Moderate Pain (4 - 6)  ALBUTerol    90 MICROgram(s) HFA Inhaler 2 Puff(s) Inhalation every 6 hours PRN Shortness of Breath and/or Wheezing  guaiFENesin   Syrup  (Sugar-Free) 100 milliGRAM(s) Oral every 6 hours PRN Cough      __________________________________________________  REVIEW OF SYSTEMS:    CONSTITUTIONAL: No fever,   EYES: no acute visual disturbances  NECK: No pain or stiffness  RESPIRATORY: No cough; No shortness of breath with Oxygen   CARDIOVASCULAR: No chest pain, no palpitations  GASTROINTESTINAL: No pain. No nausea or vomiting; No diarrhea   NEUROLOGICAL: No headache or numbness, no tremors  MUSCULOSKELETAL: No joint pain, no muscle pain  GENITOURINARY: no dysuria, no frequency, no hesitancy  PSYCHIATRY: no depression , no anxiety  ALL OTHER  ROS negative        Vital Signs Last 24 Hrs  T(C): 36.7 (17 Mar 2021 04:59), Max: 36.7 (17 Mar 2021 04:59)  T(F): 98.1 (17 Mar 2021 04:59), Max: 98.1 (17 Mar 2021 04:59)  HR: 58 (17 Mar 2021 04:59) (58 - 65)  BP: 132/69 (17 Mar 2021 04:59) (127/74 - 135/69)  BP(mean): --  RR: 17 (17 Mar 2021 04:59) (17 - 18)  SpO2: 95% (17 Mar 2021 04:59) (94% - 97%)    ________________________________________________  PHYSICAL EXAM:  GENERAL: NAD (+) NRB mask 15 liter   HEENT: Normocephalic;  conjunctivae and sclerae clear; moist mucous membranes;   NECK : supple  CHEST/LUNG: Clear to auscultation bilaterally with good air entry   HEART: S1 S2  regular; no murmurs, gallops or rubs  ABDOMEN: Soft, Nontender, Nondistended; Bowel sounds present  EXTREMITIES: no cyanosis; no edema; no calf tenderness  SKIN: warm and dry; no rash  NERVOUS SYSTEM:  Awake and alert; Oriented  to place, person and time ; no new deficits    _________________________________________________  LABS:                        13.8   9.11  )-----------( 234      ( 17 Mar 2021 07:01 )             41.1     03-17    141  |  109<H>  |  26<H>  ----------------------------<  128<H>  3.9   |  22  |  0.78    Ca    8.3<L>      17 Mar 2021 07:01  Phos  2.8     03-17  Mg     2.6     03-17          CAPILLARY BLOOD GLUCOSE      POCT Blood Glucose.: 110 mg/dL (17 Mar 2021 08:10)  POCT Blood Glucose.: 145 mg/dL (16 Mar 2021 22:07)  POCT Blood Glucose.: 139 mg/dL (16 Mar 2021 17:49)  POCT Blood Glucose.: 147 mg/dL (16 Mar 2021 11:39)        RADIOLOGY & ADDITIONAL TESTS:  < from: Xray Chest 1 View-PORTABLE IMMEDIATE (03.14.21 @ 13:14) >  EXAM:  XR CHEST PORTABLE IMMED 1V                            PROCEDURE DATE:  03/14/2021          INTERPRETATION:  Clinical Information: Dyspnea    Technique: AP chest image.    Comparison: None    Findings/  Impression: The heart is unremarkable. Large airspace opacity left mid and lower lung.            HOWIE HILL MD; Attending Interventional Radiologist  This document has been electronically signed. Mar 14 2021  2:53PM    < end of copied text >    Imaging Personally Reviewed:  YES    Consultant(s) Notes Reviewed:   YES    Care Discussed with Consultants : pulmonology     Plan of care was discussed with patient and /or primary care giver; all questions and concerns were addressed and care was aligned with patient's wishes.

## 2021-03-17 NOTE — PROGRESS NOTE ADULT - ASSESSMENT
Assessment and Recommendation:   Problem/Recommendation - 1:  Problem: COVID-19. Recommendation: isolation precautions  oxygen supp PRN  monitor oxygen sat  check LFT, LDH, CRP, D-Dimer, Ferritin and procalcitonin  Vit C, D and zinc supp  montelukast 10 mgs po Qhs  IV steroids.    Problem/Recommendation - 2:  ·  Problem: UTI (urinary tract infection).  Recommendation: check pending culture  antibiotics.     Problem/Recommendation - 3:  ·  Problem: Chest pain.  Recommendation: likely related to Covid-19 infection.     Problem/Recommendation - 4:  ·  Problem: Transaminitis.  Recommendation: monitor LFTs  GI F/U

## 2021-03-17 NOTE — PROGRESS NOTE ADULT - PROBLEM SELECTOR PLAN 1
COVID 19 PCR positive 3/14 with positive AB  SPO2 96% on 15L NRB  Pt O2 saturations dropped when attempted to wean this AM  Continue with Dexamethasone (day 4)  Continue with isolation precaution  Continue with O2 support  Continue with supportive care  Encourage pronning as tolerated  Follow up biomarkers and LFTs

## 2021-03-17 NOTE — PROGRESS NOTE ADULT - SUBJECTIVE AND OBJECTIVE BOX
Patient is a 55y old  Male who presents with a chief complaint of SOB (17 Mar 2021 09:26)  Awake, alert, comfortable in bed in NAD. Still with Parmar and sob. Cough improved    INTERVAL HPI/OVERNIGHT EVENTS:      VITAL SIGNS:  T(F): 98.1 (03-17-21 @ 04:59)  HR: 58 (03-17-21 @ 04:59)  BP: 132/69 (03-17-21 @ 04:59)  RR: 17 (03-17-21 @ 04:59)  SpO2: 95% (03-17-21 @ 04:59)  Wt(kg): --  I&O's Detail          REVIEW OF SYSTEMS:    CONSTITUTIONAL:  No fevers, chills, sweats    HEENT:  Eyes:  No diplopia or blurred vision. ENT:  No earache, sore throat or runny nose.    CARDIOVASCULAR:  No pressure, squeezing, tightness, or heaviness about the chest; no palpitations.    RESPIRATORY:  Per HPI    GASTROINTESTINAL:  No abdominal pain, nausea, vomiting or diarrhea.    GENITOURINARY:  No dysuria, frequency or urgency.    NEUROLOGIC:  No paresthesias, fasciculations, seizures or weakness.    PSYCHIATRIC:  No disorder of thought or mood.      PHYSICAL EXAM:    Constitutional: Well developed and nourished  Eyes:Perrla  ENMT: normal  Neck:supple  Respiratory: good air entry  Cardiovascular: S1 S2 regular  Gastrointestinal: Soft, Non tender  Extremities: No edema  Vascular:normal  Neurological:Awake, alert,Ox3  Musculoskeletal:Normal      MEDICATIONS  (STANDING):  ascorbic acid 1000 milliGRAM(s) Oral daily  cholecalciferol 2000 Unit(s) Oral daily  dexAMETHasone     Tablet 6 milliGRAM(s) Oral daily  enoxaparin Injectable 40 milliGRAM(s) SubCutaneous daily  insulin lispro (ADMELOG) corrective regimen sliding scale   SubCutaneous three times a day before meals  montelukast 10 milliGRAM(s) Oral at bedtime  pantoprazole    Tablet 40 milliGRAM(s) Oral before breakfast  phenazopyridine 100 milliGRAM(s) Oral every 8 hours  zinc sulfate 220 milliGRAM(s) Oral daily    MEDICATIONS  (PRN):  acetaminophen   Tablet .. 650 milliGRAM(s) Oral every 6 hours PRN Temp greater or equal to 38C (100.4F), Moderate Pain (4 - 6)  ALBUTerol    90 MICROgram(s) HFA Inhaler 2 Puff(s) Inhalation every 6 hours PRN Shortness of Breath and/or Wheezing  guaiFENesin   Syrup  (Sugar-Free) 100 milliGRAM(s) Oral every 6 hours PRN Cough      Allergies    No Known Allergies    Intolerances        LABS:                        13.8   9.11  )-----------( 234      ( 17 Mar 2021 07:01 )             41.1     03-17    141  |  109<H>  |  26<H>  ----------------------------<  128<H>  3.9   |  22  |  0.78    Ca    8.3<L>      17 Mar 2021 07:01  Phos  2.8     03-17  Mg     2.6     03-17                CAPILLARY BLOOD GLUCOSE      POCT Blood Glucose.: 110 mg/dL (17 Mar 2021 08:10)  POCT Blood Glucose.: 145 mg/dL (16 Mar 2021 22:07)  POCT Blood Glucose.: 139 mg/dL (16 Mar 2021 17:49)  POCT Blood Glucose.: 147 mg/dL (16 Mar 2021 11:39)    pro-bnp -- 03-14 @ 13:21     d-dimer 423  03-14 @ 13:21      RADIOLOGY & ADDITIONAL TESTS:    CXR:  < from: Xray Chest 1 View-PORTABLE IMMEDIATE (03.14.21 @ 13:14) >  Findings/  Impression: The heart is unremarkable. Large airspace opacity left mid and lower lung.    < end of copied text >    Ct scan chest:    ekg;    echo:

## 2021-03-18 LAB
ALBUMIN SERPL ELPH-MCNC: 2.5 G/DL — LOW (ref 3.5–5)
ALP SERPL-CCNC: 78 U/L — SIGNIFICANT CHANGE UP (ref 40–120)
ALT FLD-CCNC: 169 U/L DA — HIGH (ref 10–60)
ANION GAP SERPL CALC-SCNC: 9 MMOL/L — SIGNIFICANT CHANGE UP (ref 5–17)
AST SERPL-CCNC: 75 U/L — HIGH (ref 10–40)
BILIRUB SERPL-MCNC: 1 MG/DL — SIGNIFICANT CHANGE UP (ref 0.2–1.2)
BUN SERPL-MCNC: 22 MG/DL — HIGH (ref 7–18)
CALCIUM SERPL-MCNC: 8.4 MG/DL — SIGNIFICANT CHANGE UP (ref 8.4–10.5)
CHLORIDE SERPL-SCNC: 107 MMOL/L — SIGNIFICANT CHANGE UP (ref 96–108)
CO2 SERPL-SCNC: 23 MMOL/L — SIGNIFICANT CHANGE UP (ref 22–31)
CREAT SERPL-MCNC: 0.74 MG/DL — SIGNIFICANT CHANGE UP (ref 0.5–1.3)
CRP SERPL-MCNC: 31 MG/L — HIGH
FERRITIN SERPL-MCNC: 2379 NG/ML — HIGH (ref 30–400)
GLUCOSE BLDC GLUCOMTR-MCNC: 110 MG/DL — HIGH (ref 70–99)
GLUCOSE BLDC GLUCOMTR-MCNC: 120 MG/DL — HIGH (ref 70–99)
GLUCOSE BLDC GLUCOMTR-MCNC: 80 MG/DL — SIGNIFICANT CHANGE UP (ref 70–99)
GLUCOSE SERPL-MCNC: 83 MG/DL — SIGNIFICANT CHANGE UP (ref 70–99)
HCT VFR BLD CALC: 41.3 % — SIGNIFICANT CHANGE UP (ref 39–50)
HGB BLD-MCNC: 14.3 G/DL — SIGNIFICANT CHANGE UP (ref 13–17)
LDH SERPL L TO P-CCNC: 464 U/L — HIGH (ref 120–225)
MCHC RBC-ENTMCNC: 32 PG — SIGNIFICANT CHANGE UP (ref 27–34)
MCHC RBC-ENTMCNC: 34.6 GM/DL — SIGNIFICANT CHANGE UP (ref 32–36)
MCV RBC AUTO: 92.4 FL — SIGNIFICANT CHANGE UP (ref 80–100)
NRBC # BLD: 0 /100 WBCS — SIGNIFICANT CHANGE UP (ref 0–0)
PLATELET # BLD AUTO: 267 K/UL — SIGNIFICANT CHANGE UP (ref 150–400)
POTASSIUM SERPL-MCNC: 3.9 MMOL/L — SIGNIFICANT CHANGE UP (ref 3.5–5.3)
POTASSIUM SERPL-SCNC: 3.9 MMOL/L — SIGNIFICANT CHANGE UP (ref 3.5–5.3)
PROT SERPL-MCNC: 6.2 G/DL — SIGNIFICANT CHANGE UP (ref 6–8.3)
RBC # BLD: 4.47 M/UL — SIGNIFICANT CHANGE UP (ref 4.2–5.8)
RBC # FLD: 13.1 % — SIGNIFICANT CHANGE UP (ref 10.3–14.5)
SODIUM SERPL-SCNC: 139 MMOL/L — SIGNIFICANT CHANGE UP (ref 135–145)
WBC # BLD: 9.59 K/UL — SIGNIFICANT CHANGE UP (ref 3.8–10.5)
WBC # FLD AUTO: 9.59 K/UL — SIGNIFICANT CHANGE UP (ref 3.8–10.5)

## 2021-03-18 RX ORDER — ASPIRIN/CALCIUM CARB/MAGNESIUM 324 MG
81 TABLET ORAL DAILY
Refills: 0 | Status: DISCONTINUED | OUTPATIENT
Start: 2021-03-18 | End: 2021-03-29

## 2021-03-18 RX ORDER — ALBUTEROL 90 UG/1
2 AEROSOL, METERED ORAL EVERY 6 HOURS
Refills: 0 | Status: DISCONTINUED | OUTPATIENT
Start: 2021-03-18 | End: 2021-04-20

## 2021-03-18 RX ADMIN — MONTELUKAST 10 MILLIGRAM(S): 4 TABLET, CHEWABLE ORAL at 21:35

## 2021-03-18 RX ADMIN — ENOXAPARIN SODIUM 40 MILLIGRAM(S): 100 INJECTION SUBCUTANEOUS at 12:34

## 2021-03-18 RX ADMIN — Medication 6 MILLIGRAM(S): at 06:08

## 2021-03-18 RX ADMIN — ALBUTEROL 2 PUFF(S): 90 AEROSOL, METERED ORAL at 21:35

## 2021-03-18 RX ADMIN — Medication 1000 MILLIGRAM(S): at 12:34

## 2021-03-18 RX ADMIN — Medication 100 MILLIGRAM(S): at 13:54

## 2021-03-18 RX ADMIN — Medication 100 MILLIGRAM(S): at 21:35

## 2021-03-18 RX ADMIN — Medication 2000 UNIT(S): at 12:34

## 2021-03-18 RX ADMIN — Medication 100 MILLIGRAM(S): at 06:07

## 2021-03-18 RX ADMIN — ALBUTEROL 2 PUFF(S): 90 AEROSOL, METERED ORAL at 17:26

## 2021-03-18 RX ADMIN — PANTOPRAZOLE SODIUM 40 MILLIGRAM(S): 20 TABLET, DELAYED RELEASE ORAL at 06:07

## 2021-03-18 RX ADMIN — ZINC SULFATE TAB 220 MG (50 MG ZINC EQUIVALENT) 220 MILLIGRAM(S): 220 (50 ZN) TAB at 12:34

## 2021-03-18 RX ADMIN — ALBUTEROL 2 PUFF(S): 90 AEROSOL, METERED ORAL at 12:35

## 2021-03-18 NOTE — PROGRESS NOTE ADULT - ASSESSMENT
55M, no PMH, PSH appy and moody 1990s presented 3/14 with x9 days worsening cough, subjective fevers, and SOB, with x2-3 days dysuria and central, non-radiating, constant CP. Admitted for covid19

## 2021-03-18 NOTE — PROGRESS NOTE ADULT - SUBJECTIVE AND OBJECTIVE BOX
PGY-1 Progress Note discussed with attending    PAGER #: [[phone]] TILL 5:00 PM  PLEASE CONTACT ON CALL TEAM:  - On Call Team (Please refer to Yocasta) FROM 5:00 PM - 8:30PM  - Nightfloat Team FROM 8:30 -7:30 AM    CHIEF COMPLAINT & BRIEF HOSPITAL COURSE:    INTERVAL HPI/OVERNIGHT EVENTS:       MEDICATIONS  (STANDING):  ALBUTerol    90 MICROgram(s) HFA Inhaler 2 Puff(s) Inhalation every 6 hours  ascorbic acid 1000 milliGRAM(s) Oral daily  cholecalciferol 2000 Unit(s) Oral daily  dexAMETHasone     Tablet 6 milliGRAM(s) Oral daily  enoxaparin Injectable 40 milliGRAM(s) SubCutaneous daily  insulin lispro (ADMELOG) corrective regimen sliding scale   SubCutaneous three times a day before meals  montelukast 10 milliGRAM(s) Oral at bedtime  pantoprazole    Tablet 40 milliGRAM(s) Oral before breakfast  phenazopyridine 100 milliGRAM(s) Oral every 8 hours  zinc sulfate 220 milliGRAM(s) Oral daily    MEDICATIONS  (PRN):  acetaminophen   Tablet .. 650 milliGRAM(s) Oral every 6 hours PRN Temp greater or equal to 38C (100.4F), Moderate Pain (4 - 6)  guaiFENesin   Syrup  (Sugar-Free) 100 milliGRAM(s) Oral every 6 hours PRN Cough      REVIEW OF SYSTEMS:  CONSTITUTIONAL: No fever, weight loss, or fatigue  RESPIRATORY: No cough, wheezing, chills or hemoptysis; No shortness of breath  CARDIOVASCULAR: No chest pain, palpitations, dizziness, or leg swelling  GASTROINTESTINAL: No abdominal pain. No nausea, vomiting, or hematemesis; No diarrhea or constipation. No melena or hematochezia.  GENITOURINARY: No dysuria or hematuria, urinary frequency  NEUROLOGICAL: No headaches, memory loss, loss of strength, numbness, or tremors  SKIN: No itching, burning, rashes, or lesions     Vital Signs Last 24 Hrs  T(C): 37.1 (18 Mar 2021 05:32), Max: 37.1 (18 Mar 2021 05:32)  T(F): 98.7 (18 Mar 2021 05:32), Max: 98.7 (18 Mar 2021 05:32)  HR: 73 (18 Mar 2021 05:32) (58 - 73)  BP: 133/69 (18 Mar 2021 05:32) (132/72 - 145/65)  BP(mean): --  RR: 17 (18 Mar 2021 05:32) (17 - 20)  SpO2: 94% (18 Mar 2021 05:32) (88% - 96%)    PHYSICAL EXAMINATION:  GENERAL: NAD, well built  HEAD:  Atraumatic, Normocephalic  EYES:  conjunctiva and sclera clear  NECK: Supple, No JVD, Normal thyroid  CHEST/LUNG: Clear to auscultation. Clear to percussion bilaterally; No rales, rhonchi, wheezing, or rubs  HEART: Regular rate and rhythm; No murmurs, rubs, or gallops  ABDOMEN: Soft, Nontender, Nondistended; Bowel sounds present  NERVOUS SYSTEM:  Alert & Oriented X3,    EXTREMITIES:  2+ Peripheral Pulses, No clubbing, cyanosis, or edema  SKIN: warm dry                          14.3   9.59  )-----------( 267      ( 18 Mar 2021 07:00 )             41.3     03-18    139  |  107  |  22<H>  ----------------------------<  83  3.9   |  23  |  0.74    Ca    8.4      18 Mar 2021 07:00  Phos  2.8     03-17  Mg     2.6     03-17    TPro  6.2  /  Alb  2.5<L>  /  TBili  1.0  /  DBili  x   /  AST  75<H>  /  ALT  169<H>  /  AlkPhos  78  03-18    LIVER FUNCTIONS - ( 18 Mar 2021 07:00 )  Alb: 2.5 g/dL / Pro: 6.2 g/dL / ALK PHOS: 78 U/L / ALT: 169 U/L DA / AST: 75 U/L / GGT: x                   CAPILLARY BLOOD GLUCOSE      POCT Blood Glucose.: 80 mg/dL (18 Mar 2021 08:02)          RADIOLOGY & ADDITIONAL TESTS:                   PGY-1 Progress Note discussed with attending    PAGER #: [[phone]] TILL 5:00 PM  PLEASE CONTACT ON CALL TEAM:  - On Call Team (Please refer to Yocasta) FROM 5:00 PM - 8:30PM  - Nightfloat Team FROM 8:30 -7:30 AM    CHIEF COMPLAINT & BRIEF HOSPITAL COURSE:  55M, no PMH, PSH appy and moody lane presented 3/14 with x9 days worsening cough, subjective fevers, and SOB, with x2-3 days dysuria and central, non-radiating, constant CP. Additionally endorses HA, LH, and myaglias, and denies abdominal pain and n/v/d. Patient is a nonsmoker, lives alone, and denies sick contacts. In ED, T 99.1, HR 76, /65, RR 18, and SPO2 98%. Given discomfort of breathing, placed on 4L NC, with improvement. Labs notable for lymphopenia and neutrophilia despite WBC wnl, d-dimer 423, AST//114, lactate 3.3, and . Trop negative x1. +COVID. CXR notable for b/l infiltrates, L>R. Given x1 dose Tylenol and 1L IVF bolus in ED. Admitted to F for management of COVID PNA.       INTERVAL HPI/OVERNIGHT EVENTS:   No acute overnight events. Pt satting 91% on 15L NRB. Pt tachypneic to 36 this am but not in acute distress. Low threshhold for ICU. Will observe closely and consult ICU if deteriorates.      MEDICATIONS  (STANDING):  ALBUTerol    90 MICROgram(s) HFA Inhaler 2 Puff(s) Inhalation every 6 hours  ascorbic acid 1000 milliGRAM(s) Oral daily  cholecalciferol 2000 Unit(s) Oral daily  dexAMETHasone     Tablet 6 milliGRAM(s) Oral daily  enoxaparin Injectable 40 milliGRAM(s) SubCutaneous daily  insulin lispro (ADMELOG) corrective regimen sliding scale   SubCutaneous three times a day before meals  montelukast 10 milliGRAM(s) Oral at bedtime  pantoprazole    Tablet 40 milliGRAM(s) Oral before breakfast  phenazopyridine 100 milliGRAM(s) Oral every 8 hours  zinc sulfate 220 milliGRAM(s) Oral daily    MEDICATIONS  (PRN):  acetaminophen   Tablet .. 650 milliGRAM(s) Oral every 6 hours PRN Temp greater or equal to 38C (100.4F), Moderate Pain (4 - 6)  guaiFENesin   Syrup  (Sugar-Free) 100 milliGRAM(s) Oral every 6 hours PRN Cough      REVIEW OF SYSTEMS:  CONSTITUTIONAL: No fever, weight loss, or fatigue  RESPIRATORY: No cough, wheezing, chills or hemoptysis; No shortness of breath  CARDIOVASCULAR: No chest pain, palpitations, dizziness, or leg swelling  GASTROINTESTINAL: No abdominal pain. No nausea, vomiting, or hematemesis; No diarrhea or constipation. No melena or hematochezia.  GENITOURINARY: No dysuria or hematuria, urinary frequency  NEUROLOGICAL: No headaches, memory loss, loss of strength, numbness, or tremors  SKIN: No itching, burning, rashes, or lesions     Vital Signs Last 24 Hrs  T(C): 37.1 (18 Mar 2021 05:32), Max: 37.1 (18 Mar 2021 05:32)  T(F): 98.7 (18 Mar 2021 05:32), Max: 98.7 (18 Mar 2021 05:32)  HR: 73 (18 Mar 2021 05:32) (58 - 73)  BP: 133/69 (18 Mar 2021 05:32) (132/72 - 145/65)  BP(mean): --  RR: 17 (18 Mar 2021 05:32) (17 - 20)  SpO2: 94% (18 Mar 2021 05:32) (88% - 96%)    PHYSICAL EXAMINATION:  GENERAL:well built  HEAD:  Atraumatic, Normocephalic  EYES:  conjunctiva and sclera clear  NECK: Supple, No JVD, Normal thyroid  CHEST/LUNG: Clear to auscultation. Clear to percussion bilaterally; No rales, rhonchi, wheezing, or rubs  HEART: Regular rate and rhythm; No murmurs, rubs, or gallops  ABDOMEN: Soft, Nontender, Nondistended; Bowel sounds present  NERVOUS SYSTEM:  Alert & Oriented X3,    EXTREMITIES:  2+ Peripheral Pulses, No clubbing, cyanosis, or edema  SKIN: warm dry                          14.3   9.59  )-----------( 267      ( 18 Mar 2021 07:00 )             41.3     03-18    139  |  107  |  22<H>  ----------------------------<  83  3.9   |  23  |  0.74    Ca    8.4      18 Mar 2021 07:00  Phos  2.8     03-17  Mg     2.6     03-17    TPro  6.2  /  Alb  2.5<L>  /  TBili  1.0  /  DBili  x   /  AST  75<H>  /  ALT  169<H>  /  AlkPhos  78  03-18    LIVER FUNCTIONS - ( 18 Mar 2021 07:00 )  Alb: 2.5 g/dL / Pro: 6.2 g/dL / ALK PHOS: 78 U/L / ALT: 169 U/L DA / AST: 75 U/L / GGT: x                   CAPILLARY BLOOD GLUCOSE      POCT Blood Glucose.: 80 mg/dL (18 Mar 2021 08:02)          RADIOLOGY & ADDITIONAL TESTS:

## 2021-03-18 NOTE — PROGRESS NOTE ADULT - SUBJECTIVE AND OBJECTIVE BOX
Patient is a 55y old  Male who presents with a chief complaint of SOB (17 Mar 2021 10:31)    pt seen in ed [  ], reg med floor [  x ], bed [ x ], chair at bedside [   ], a+o x3 [ x ], lethargic [  ],    nad [ x ]      Allergies    No Known Allergies        Vitals    T(F): 98.7 (03-18-21 @ 05:32), Max: 98.7 (03-18-21 @ 05:32)  HR: 73 (03-18-21 @ 05:32) (58 - 73)  BP: 133/69 (03-18-21 @ 05:32) (132/72 - 145/65)  RR: 17 (03-18-21 @ 05:32) (17 - 20)  SpO2: 94% (03-18-21 @ 05:32) (88% - 96%)  Wt(kg): --  CAPILLARY BLOOD GLUCOSE      POCT Blood Glucose.: 121 mg/dL (17 Mar 2021 21:44)      Labs                          13.8   9.11  )-----------( 234      ( 17 Mar 2021 07:01 )             41.1       03-17    141  |  109<H>  |  26<H>  ----------------------------<  128<H>  3.9   |  22  |  0.78    Ca    8.3<L>      17 Mar 2021 07:01  Phos  2.8     03-17  Mg     2.6     03-17    TPro  6.1  /  Alb  2.4<L>  /  TBili  0.6  /  DBili  0.2  /  AST  86<H>  /  ALT  177<H>  /  AlkPhos  78  03-17            .Urine Clean Catch (Midstream)  03-15 @ 00:52   No growth  --  --          Radiology Results      Meds    MEDICATIONS  (STANDING):  ascorbic acid 1000 milliGRAM(s) Oral daily  cholecalciferol 2000 Unit(s) Oral daily  dexAMETHasone     Tablet 6 milliGRAM(s) Oral daily  enoxaparin Injectable 40 milliGRAM(s) SubCutaneous daily  insulin lispro (ADMELOG) corrective regimen sliding scale   SubCutaneous three times a day before meals  montelukast 10 milliGRAM(s) Oral at bedtime  pantoprazole    Tablet 40 milliGRAM(s) Oral before breakfast  phenazopyridine 100 milliGRAM(s) Oral every 8 hours  zinc sulfate 220 milliGRAM(s) Oral daily      MEDICATIONS  (PRN):  acetaminophen   Tablet .. 650 milliGRAM(s) Oral every 6 hours PRN Temp greater or equal to 38C (100.4F), Moderate Pain (4 - 6)  ALBUTerol    90 MICROgram(s) HFA Inhaler 2 Puff(s) Inhalation every 6 hours PRN Shortness of Breath and/or Wheezing  guaiFENesin   Syrup  (Sugar-Free) 100 milliGRAM(s) Oral every 6 hours PRN Cough      Physical Exam    Neuro :  no focal deficits  Respiratory: CTA B/L  CV: RRR, S1S2, no murmurs,   Abdominal: Soft, NT, ND +BS,  Extremities: No edema, + peripheral pulses    ASSESSMENT    Hypoxemia 2nd to covid pna  h/o appendectomy  cholecystectomy        PLAN    contact and airborne isolation  d/c remdesevir given covid ab positive noted   cont dexamethasone  pepcid, singulair, vit c, vit d, zinc  cont albuterol inhaler   pulm f/u  procalcitonin, D-dimer, crp, ldh, ferritin, lactate noted ,    cont tylenol prn,   cont robitussin prn   O2 sat (94% - 97%) on nrb 15L  O2 via nrbm and taper as tolerated  cont current meds         Patient is a 55y old  Male who presents with a chief complaint of SOB (17 Mar 2021 10:31)    pt seen in ed [  ], reg med floor [  x ], bed [ x ], chair at bedside [   ], a+o x3 [ x ], lethargic [  ],    nad [ x ]      Allergies    No Known Allergies        Vitals    T(F): 98.7 (03-18-21 @ 05:32), Max: 98.7 (03-18-21 @ 05:32)  HR: 73 (03-18-21 @ 05:32) (58 - 73)  BP: 133/69 (03-18-21 @ 05:32) (132/72 - 145/65)  RR: 17 (03-18-21 @ 05:32) (17 - 20)  SpO2: 94% (03-18-21 @ 05:32) (88% - 96%)  Wt(kg): --  CAPILLARY BLOOD GLUCOSE      POCT Blood Glucose.: 121 mg/dL (17 Mar 2021 21:44)      Labs                          13.8   9.11  )-----------( 234      ( 17 Mar 2021 07:01 )             41.1       03-17    141  |  109<H>  |  26<H>  ----------------------------<  128<H>  3.9   |  22  |  0.78    Ca    8.3<L>      17 Mar 2021 07:01  Phos  2.8     03-17  Mg     2.6     03-17    TPro  6.1  /  Alb  2.4<L>  /  TBili  0.6  /  DBili  0.2  /  AST  86<H>  /  ALT  177<H>  /  AlkPhos  78  03-17            .Urine Clean Catch (Midstream)  03-15 @ 00:52   No growth  --  --          Radiology Results      Meds    MEDICATIONS  (STANDING):  ascorbic acid 1000 milliGRAM(s) Oral daily  cholecalciferol 2000 Unit(s) Oral daily  dexAMETHasone     Tablet 6 milliGRAM(s) Oral daily  enoxaparin Injectable 40 milliGRAM(s) SubCutaneous daily  insulin lispro (ADMELOG) corrective regimen sliding scale   SubCutaneous three times a day before meals  montelukast 10 milliGRAM(s) Oral at bedtime  pantoprazole    Tablet 40 milliGRAM(s) Oral before breakfast  phenazopyridine 100 milliGRAM(s) Oral every 8 hours  zinc sulfate 220 milliGRAM(s) Oral daily      MEDICATIONS  (PRN):  acetaminophen   Tablet .. 650 milliGRAM(s) Oral every 6 hours PRN Temp greater or equal to 38C (100.4F), Moderate Pain (4 - 6)  ALBUTerol    90 MICROgram(s) HFA Inhaler 2 Puff(s) Inhalation every 6 hours PRN Shortness of Breath and/or Wheezing  guaiFENesin   Syrup  (Sugar-Free) 100 milliGRAM(s) Oral every 6 hours PRN Cough      Physical Exam    Neuro :  no focal deficits  Respiratory: CTA B/L  CV: RRR, S1S2, no murmurs,   Abdominal: Soft, NT, ND +BS,  Extremities: No edema, + peripheral pulses    ASSESSMENT    Hypoxemia 2nd to covid pna  h/o appendectomy  cholecystectomy        PLAN    contact and airborne isolation  d/c remdesevir given covid ab positive noted   cont dexamethasone  pepcid, singulair, vit c, vit d, zinc  cont albuterol inhaler   pulm f/u  procalcitonin, D-dimer, crp, ldh, ferritin, lactate noted ,    cont tylenol prn,   cont robitussin prn   O2 sat (88% - 96%) on nrb 15L  O2 via nrbm and taper as tolerated   add aspirin 81mg daily  cont current meds

## 2021-03-18 NOTE — PROGRESS NOTE ADULT - PROBLEM SELECTOR PLAN 1
COVID 19 PCR positive 3/14 with positive AB  SPO2 96% on 15L NRB  Pt O2 saturations dropped when attempted to wean this AM  Continue with Dexamethasone (day 4)  Continue with isolation precaution  Continue with O2 support  Continue with supportive care  Encourage pronning as tolerated  Follow up biomarkers and LFTs COVID 19 PCR positive 3/14 with positive AB  SPO2 91% on 15L NRB  Continue with Dexamethasone (day 5)  Remdesivir not given as antibodies +  Continue with isolation precaution  Continue with O2 support  Continue with supportive care  Encourage pronning as tolerated  Follow up biomarkers and LFTs  Low threshold for ICU

## 2021-03-18 NOTE — PROGRESS NOTE ADULT - ASSESSMENT
Assessment and Recommendation:   Problem/Recommendation - 1:  Problem: COVID-19. Recommendation: isolation precautions  oxygen supp - taper as feasible, on NRM.   monitor oxygen sat  check LFT, LDH, CRP, D-Dimer, Ferritin and procalcitonin  Vit C, D and zinc supp  montelukast 10 mgs po Qhs  IV steroids.    Problem/Recommendation - 2:  ·  Problem: UTI (urinary tract infection).  Recommendation: check pending culture  antibiotics.     Problem/Recommendation - 3:  ·  Problem: Chest pain.  Recommendation: likely related to Covid-19 infection.     Problem/Recommendation - 4:  ·  Problem: Transaminitis.  Recommendation: monitor LFTs  GI F/U

## 2021-03-18 NOTE — PROGRESS NOTE ADULT - SUBJECTIVE AND OBJECTIVE BOX
Pt is awake, alert, lying in bed on NRM. Saturating 94%. Afebrile.    INTERVAL HPI/OVERNIGHT EVENTS:      VITAL SIGNS:  T(F): 98.7 (03-18-21 @ 05:32)  HR: 73 (03-18-21 @ 05:32)  BP: 133/69 (03-18-21 @ 05:32)  RR: 17 (03-18-21 @ 05:32)  SpO2: 94% (03-18-21 @ 05:32)  Wt(kg): --  I&O's Detail          REVIEW OF SYSTEMS:    CONSTITUTIONAL:  No fevers, chills, sweats    HEENT:  Eyes:  No diplopia or blurred vision. ENT:  No earache, sore throat or runny nose.    CARDIOVASCULAR:  No pressure, squeezing, tightness, or heaviness about the chest; no palpitations.    RESPIRATORY:  Per HPI    GASTROINTESTINAL:  No abdominal pain, nausea, vomiting or diarrhea.    GENITOURINARY:  No dysuria, frequency or urgency.    NEUROLOGIC:  No paresthesias, fasciculations, seizures or weakness.    PSYCHIATRIC:  No disorder of thought or mood.      PHYSICAL EXAM:    Constitutional: Well developed and nourished  Eyes: Perrla  ENMT: normal  Neck: supple  Respiratory: good air entry  Cardiovascular: S1 S2 regular  Gastrointestinal: Soft, Non tender  Extremities: No edema  Vascular: normal  Neurological: Awake, alert,Ox3  Musculoskeletal: Normal      MEDICATIONS  (STANDING):  ALBUTerol    90 MICROgram(s) HFA Inhaler 2 Puff(s) Inhalation every 6 hours  ascorbic acid 1000 milliGRAM(s) Oral daily  cholecalciferol 2000 Unit(s) Oral daily  dexAMETHasone     Tablet 6 milliGRAM(s) Oral daily  enoxaparin Injectable 40 milliGRAM(s) SubCutaneous daily  insulin lispro (ADMELOG) corrective regimen sliding scale   SubCutaneous three times a day before meals  montelukast 10 milliGRAM(s) Oral at bedtime  pantoprazole    Tablet 40 milliGRAM(s) Oral before breakfast  phenazopyridine 100 milliGRAM(s) Oral every 8 hours  zinc sulfate 220 milliGRAM(s) Oral daily    MEDICATIONS  (PRN):  acetaminophen   Tablet .. 650 milliGRAM(s) Oral every 6 hours PRN Temp greater or equal to 38C (100.4F), Moderate Pain (4 - 6)  guaiFENesin   Syrup  (Sugar-Free) 100 milliGRAM(s) Oral every 6 hours PRN Cough      Allergies    No Known Allergies    Intolerances        LABS:                        14.3   9.59  )-----------( 267      ( 18 Mar 2021 07:00 )             41.3     03-18    139  |  107  |  22<H>  ----------------------------<  83  3.9   |  23  |  0.74    Ca    8.4      18 Mar 2021 07:00  Phos  2.8     03-17  Mg     2.6     03-17    TPro  6.2  /  Alb  2.5<L>  /  TBili  1.0  /  DBili  x   /  AST  75<H>  /  ALT  169<H>  /  AlkPhos  78  03-18    CAPILLARY BLOOD GLUCOSE    POCT Blood Glucose.: 80 mg/dL (18 Mar 2021 08:02)  POCT Blood Glucose.: 121 mg/dL (17 Mar 2021 21:44)  POCT Blood Glucose.: 148 mg/dL (17 Mar 2021 16:48)    pro-bnp -- 03-17 @ 10:55     d-dimer 151  03-17 @ 10:55  pro-bnp -- 03-14 @ 13:21     d-dimer 423  03-14 @ 13:21      RADIOLOGY & ADDITIONAL TESTS:    CXR:    Ct scan chest:    ekg;    echo:

## 2021-03-19 LAB
ALBUMIN SERPL ELPH-MCNC: 2.5 G/DL — LOW (ref 3.5–5)
ALP SERPL-CCNC: 82 U/L — SIGNIFICANT CHANGE UP (ref 40–120)
ALT FLD-CCNC: 171 U/L DA — HIGH (ref 10–60)
ANION GAP SERPL CALC-SCNC: 10 MMOL/L — SIGNIFICANT CHANGE UP (ref 5–17)
AST SERPL-CCNC: 62 U/L — HIGH (ref 10–40)
BASE EXCESS BLDA CALC-SCNC: -0.6 MMOL/L — SIGNIFICANT CHANGE UP (ref -2–2)
BILIRUB SERPL-MCNC: 1.2 MG/DL — SIGNIFICANT CHANGE UP (ref 0.2–1.2)
BLOOD GAS COMMENTS ARTERIAL: SIGNIFICANT CHANGE UP
BUN SERPL-MCNC: 21 MG/DL — HIGH (ref 7–18)
CALCIUM SERPL-MCNC: 8.3 MG/DL — LOW (ref 8.4–10.5)
CHLORIDE SERPL-SCNC: 105 MMOL/L — SIGNIFICANT CHANGE UP (ref 96–108)
CO2 SERPL-SCNC: 22 MMOL/L — SIGNIFICANT CHANGE UP (ref 22–31)
CREAT SERPL-MCNC: 0.75 MG/DL — SIGNIFICANT CHANGE UP (ref 0.5–1.3)
CRP SERPL-MCNC: 64 MG/L — HIGH
FERRITIN SERPL-MCNC: 2509 NG/ML — HIGH (ref 30–400)
GLUCOSE BLDC GLUCOMTR-MCNC: 121 MG/DL — HIGH (ref 70–99)
GLUCOSE BLDC GLUCOMTR-MCNC: 131 MG/DL — HIGH (ref 70–99)
GLUCOSE BLDC GLUCOMTR-MCNC: 134 MG/DL — HIGH (ref 70–99)
GLUCOSE BLDC GLUCOMTR-MCNC: 99 MG/DL — SIGNIFICANT CHANGE UP (ref 70–99)
GLUCOSE SERPL-MCNC: 97 MG/DL — SIGNIFICANT CHANGE UP (ref 70–99)
HCO3 BLDA-SCNC: 21 MMOL/L — LOW (ref 23–27)
HCT VFR BLD CALC: 43.2 % — SIGNIFICANT CHANGE UP (ref 39–50)
HGB BLD-MCNC: 15.1 G/DL — SIGNIFICANT CHANGE UP (ref 13–17)
HOROWITZ INDEX BLDA+IHG-RTO: 100 — SIGNIFICANT CHANGE UP
LDH SERPL L TO P-CCNC: 439 U/L — HIGH (ref 120–225)
MAGNESIUM SERPL-MCNC: 2.2 MG/DL — SIGNIFICANT CHANGE UP (ref 1.6–2.6)
MCHC RBC-ENTMCNC: 32.2 PG — SIGNIFICANT CHANGE UP (ref 27–34)
MCHC RBC-ENTMCNC: 35 GM/DL — SIGNIFICANT CHANGE UP (ref 32–36)
MCV RBC AUTO: 92.1 FL — SIGNIFICANT CHANGE UP (ref 80–100)
NRBC # BLD: 0 /100 WBCS — SIGNIFICANT CHANGE UP (ref 0–0)
PCO2 BLDA: 30 MMHG — LOW (ref 32–46)
PH BLDA: 7.47 — HIGH (ref 7.35–7.45)
PHOSPHATE SERPL-MCNC: 2.6 MG/DL — SIGNIFICANT CHANGE UP (ref 2.5–4.5)
PLATELET # BLD AUTO: 291 K/UL — SIGNIFICANT CHANGE UP (ref 150–400)
PO2 BLDA: 77 MMHG — SIGNIFICANT CHANGE UP (ref 74–108)
POTASSIUM SERPL-MCNC: 3.7 MMOL/L — SIGNIFICANT CHANGE UP (ref 3.5–5.3)
POTASSIUM SERPL-SCNC: 3.7 MMOL/L — SIGNIFICANT CHANGE UP (ref 3.5–5.3)
PROT SERPL-MCNC: 6.7 G/DL — SIGNIFICANT CHANGE UP (ref 6–8.3)
RBC # BLD: 4.69 M/UL — SIGNIFICANT CHANGE UP (ref 4.2–5.8)
RBC # FLD: 13 % — SIGNIFICANT CHANGE UP (ref 10.3–14.5)
SAO2 % BLDA: 95 % — SIGNIFICANT CHANGE UP (ref 92–96)
SODIUM SERPL-SCNC: 137 MMOL/L — SIGNIFICANT CHANGE UP (ref 135–145)
WBC # BLD: 9.37 K/UL — SIGNIFICANT CHANGE UP (ref 3.8–10.5)
WBC # FLD AUTO: 9.37 K/UL — SIGNIFICANT CHANGE UP (ref 3.8–10.5)

## 2021-03-19 PROCEDURE — 71045 X-RAY EXAM CHEST 1 VIEW: CPT | Mod: 26

## 2021-03-19 RX ORDER — CHLORHEXIDINE GLUCONATE 213 G/1000ML
1 SOLUTION TOPICAL
Refills: 0 | Status: DISCONTINUED | OUTPATIENT
Start: 2021-03-19 | End: 2021-04-23

## 2021-03-19 RX ADMIN — ZINC SULFATE TAB 220 MG (50 MG ZINC EQUIVALENT) 220 MILLIGRAM(S): 220 (50 ZN) TAB at 11:00

## 2021-03-19 RX ADMIN — Medication 2000 UNIT(S): at 11:02

## 2021-03-19 RX ADMIN — Medication 1000 MILLIGRAM(S): at 11:00

## 2021-03-19 RX ADMIN — Medication 6 MILLIGRAM(S): at 05:46

## 2021-03-19 RX ADMIN — ALBUTEROL 2 PUFF(S): 90 AEROSOL, METERED ORAL at 17:05

## 2021-03-19 RX ADMIN — ALBUTEROL 2 PUFF(S): 90 AEROSOL, METERED ORAL at 22:35

## 2021-03-19 RX ADMIN — Medication 81 MILLIGRAM(S): at 02:32

## 2021-03-19 RX ADMIN — ALBUTEROL 2 PUFF(S): 90 AEROSOL, METERED ORAL at 08:56

## 2021-03-19 RX ADMIN — PANTOPRAZOLE SODIUM 40 MILLIGRAM(S): 20 TABLET, DELAYED RELEASE ORAL at 05:46

## 2021-03-19 RX ADMIN — Medication 81 MILLIGRAM(S): at 11:00

## 2021-03-19 RX ADMIN — ALBUTEROL 2 PUFF(S): 90 AEROSOL, METERED ORAL at 03:24

## 2021-03-19 RX ADMIN — Medication 100 MILLIGRAM(S): at 05:46

## 2021-03-19 RX ADMIN — ENOXAPARIN SODIUM 40 MILLIGRAM(S): 100 INJECTION SUBCUTANEOUS at 11:02

## 2021-03-19 NOTE — PROGRESS NOTE ADULT - SUBJECTIVE AND OBJECTIVE BOX
Patient is a 55y old  Male who presents with a chief complaint of SOB (18 Mar 2021 12:08)    pt seen in ed [  ], reg med floor [  x ], bed [ x ], chair at bedside [   ], a+o x3 [ x ], lethargic [  ],    nad [ x ]      Allergies    No Known Allergies        Vitals    T(F): 98.2 (03-19-21 @ 05:20), Max: 98.2 (03-19-21 @ 05:20)  HR: 66 (03-19-21 @ 05:20) (66 - 72)  BP: 135/73 (03-19-21 @ 05:20) (135/73 - 142/72)  RR: 18 (03-19-21 @ 05:20) (18 - 20)  SpO2: 90% (03-19-21 @ 05:20) (90% - 93%)  Wt(kg): --  CAPILLARY BLOOD GLUCOSE      POCT Blood Glucose.: 110 mg/dL (18 Mar 2021 21:52)      Labs                          14.3   9.59  )-----------( 267      ( 18 Mar 2021 07:00 )             41.3       03-18    139  |  107  |  22<H>  ----------------------------<  83  3.9   |  23  |  0.74    Ca    8.4      18 Mar 2021 07:00  Phos  2.8     03-17  Mg     2.6     03-17    TPro  6.2  /  Alb  2.5<L>  /  TBili  1.0  /  DBili  x   /  AST  75<H>  /  ALT  169<H>  /  AlkPhos  78  03-18            .Urine Clean Catch (Midstream)  03-15 @ 00:52   No growth  --  --          Radiology Results      Meds    MEDICATIONS  (STANDING):  ALBUTerol    90 MICROgram(s) HFA Inhaler 2 Puff(s) Inhalation every 6 hours  ascorbic acid 1000 milliGRAM(s) Oral daily  aspirin enteric coated 81 milliGRAM(s) Oral daily  cholecalciferol 2000 Unit(s) Oral daily  dexAMETHasone     Tablet 6 milliGRAM(s) Oral daily  enoxaparin Injectable 40 milliGRAM(s) SubCutaneous daily  insulin lispro (ADMELOG) corrective regimen sliding scale   SubCutaneous three times a day before meals  montelukast 10 milliGRAM(s) Oral at bedtime  pantoprazole    Tablet 40 milliGRAM(s) Oral before breakfast  zinc sulfate 220 milliGRAM(s) Oral daily      MEDICATIONS  (PRN):  acetaminophen   Tablet .. 650 milliGRAM(s) Oral every 6 hours PRN Temp greater or equal to 38C (100.4F), Moderate Pain (4 - 6)  guaiFENesin   Syrup  (Sugar-Free) 100 milliGRAM(s) Oral every 6 hours PRN Cough      Physical Exam    Neuro :  no focal deficits  Respiratory: CTA B/L  CV: RRR, S1S2, no murmurs,   Abdominal: Soft, NT, ND +BS,  Extremities: No edema, + peripheral pulses    ASSESSMENT    Hypoxemia 2nd to covid pna  h/o appendectomy  cholecystectomy        PLAN    contact and airborne isolation  d/c remdesevir given covid ab positive noted   cont dexamethasone  pepcid, singulair, vit c, vit d, zinc  cont albuterol inhaler   pulm f/u  procalcitonin, D-dimer, crp, ldh, ferritin, lactate noted ,    cont tylenol prn,   cont robitussin prn   O2 sat (88% - 96%) on nrb 15L  O2 via nrbm and taper as tolerated   add aspirin 81mg daily  cont current meds       Patient is a 55y old  Male who presents with a chief complaint of SOB (18 Mar 2021 12:08)    pt seen in ed [  ], reg med floor [  x ], bed [ x ], chair at bedside [   ], a+o x3 [ x ], lethargic [  ],    nad [ x ]      Allergies    No Known Allergies        Vitals    T(F): 98.2 (03-19-21 @ 05:20), Max: 98.2 (03-19-21 @ 05:20)  HR: 66 (03-19-21 @ 05:20) (66 - 72)  BP: 135/73 (03-19-21 @ 05:20) (135/73 - 142/72)  RR: 18 (03-19-21 @ 05:20) (18 - 20)  SpO2: 90% (03-19-21 @ 05:20) (90% - 93%)  Wt(kg): --  CAPILLARY BLOOD GLUCOSE      POCT Blood Glucose.: 110 mg/dL (18 Mar 2021 21:52)      Labs                          14.3   9.59  )-----------( 267      ( 18 Mar 2021 07:00 )             41.3       03-18    139  |  107  |  22<H>  ----------------------------<  83  3.9   |  23  |  0.74    Ca    8.4      18 Mar 2021 07:00  Phos  2.8     03-17  Mg     2.6     03-17    TPro  6.2  /  Alb  2.5<L>  /  TBili  1.0  /  DBili  x   /  AST  75<H>  /  ALT  169<H>  /  AlkPhos  78  03-18            .Urine Clean Catch (Midstream)  03-15 @ 00:52   No growth  --  --          Radiology Results      Meds    MEDICATIONS  (STANDING):  ALBUTerol    90 MICROgram(s) HFA Inhaler 2 Puff(s) Inhalation every 6 hours  ascorbic acid 1000 milliGRAM(s) Oral daily  aspirin enteric coated 81 milliGRAM(s) Oral daily  cholecalciferol 2000 Unit(s) Oral daily  dexAMETHasone     Tablet 6 milliGRAM(s) Oral daily  enoxaparin Injectable 40 milliGRAM(s) SubCutaneous daily  insulin lispro (ADMELOG) corrective regimen sliding scale   SubCutaneous three times a day before meals  montelukast 10 milliGRAM(s) Oral at bedtime  pantoprazole    Tablet 40 milliGRAM(s) Oral before breakfast  zinc sulfate 220 milliGRAM(s) Oral daily      MEDICATIONS  (PRN):  acetaminophen   Tablet .. 650 milliGRAM(s) Oral every 6 hours PRN Temp greater or equal to 38C (100.4F), Moderate Pain (4 - 6)  guaiFENesin   Syrup  (Sugar-Free) 100 milliGRAM(s) Oral every 6 hours PRN Cough      Physical Exam    Neuro :  no focal deficits  Respiratory: CTA B/L  CV: RRR, S1S2, no murmurs,   Abdominal: Soft, NT, ND +BS,  Extremities: No edema, + peripheral pulses    ASSESSMENT    Hypoxemia 2nd to covid pna  h/o appendectomy  cholecystectomy        PLAN    contact and airborne isolation  d/c remdesevir given covid ab positive noted   cont dexamethasone  pepcid, singulair, vit c, vit d, zinc  cont albuterol inhaler   pulm f/u  procalcitonin, D-dimer, crp, ldh, ferritin, lactate noted ,    cont tylenol prn,   cont robitussin prn   O2 sat (90% - 93%) on nrb 15L  O2 via nrbm   pt low threshould for intubation  pt will likely benefit from high miguel ángel O2  icu cons  cont aspirin 81mg daily  cont current meds

## 2021-03-19 NOTE — PROGRESS NOTE ADULT - ASSESSMENT
Assessment and Recommendation:   Problem/Recommendation - 1:  Problem: COVID-19. Recommendation: isolation precautions  oxygen supp - taper as feasible, on NRM.   ICU consulted.   Monitor oxygen sat  Check LFT, LDH, CRP, D-Dimer, Ferritin and procalcitonin  Vit C, D and zinc supp  Montelukast 10 mgs po Qhs  IV steroids.    Problem/Recommendation - 2:  ·  Problem: UTI (urinary tract infection).  Recommendation: check pending culture  Antibiotics.     Problem/Recommendation - 3:  ·  Problem: Chest pain.  Recommendation: likely related to Covid-19 infection.     Problem/Recommendation - 4:  ·  Problem: Transaminitis.  Recommendation: monitor LFTs  GI F/U

## 2021-03-19 NOTE — CONSULT NOTE ADULT - SUBJECTIVE AND OBJECTIVE BOX
Patient is a 55y old  Male who presents with a chief complaint of SOB (19 Mar 2021 06:24)      Initial HPI on admission:  HPI:  55M, no PMH, PSH appy and moody 1990s presented 3/14 with x9 days worsening cough, subjective fevers, and SOB, with x2-3 days dysuria and central, non-radiating, constant CP. Additionally endorses HA, LH, and myaglias, and denies abdominal pain and n/v/d. Patient is a nonsmoker, lives alone, and denies sick contacts. In ED, T 99.1, HR 76, /65, RR 18, and SPO2 98%. Given discomfort of breathing, placed on 4L NC, with improvement. Labs notable for lymphopenia and neutrophilia despite WBC wnl, d-dimer 423, AST//114, lactate 3.3, and . Trop negative x1. +COVID. CXR notable for b/l infiltrates, L>R. Given x1 dose Tylenol and 1L IVF bolus in ED. Admitted to [facility] for management of COVID PNA.  (14 Mar 2021 16:07)      BRIEF HOSPITAL COURSE:     PAST MEDICAL & SURGICAL HISTORY:  No pertinent past medical history    S/P appendectomy  1990s    S/P moody  1990s      Allergies    No Known Allergies    Intolerances      FAMILY HISTORY:    Social history reviewed: ***    Review of Systems:  CONSTITUTIONAL: No fever, chills, or fatigue  EYES: No eye pain, visual disturbances, or discharge  ENMT:  No difficulty hearing, tinnitus, vertigo; No sinus or throat pain  NECK: No pain or stiffness  RESPIRATORY: No cough, wheezing, chills or hemoptysis; No shortness of breath  CARDIOVASCULAR: No chest pain, palpitations, dizziness, or leg swelling  GASTROINTESTINAL: No abdominal or epigastric pain. No nausea, vomiting, or hematemesis; No diarrhea or constipation. No melena or hematochezia.  GENITOURINARY: No dysuria, frequency, hematuria, or incontinence  NEUROLOGICAL: No headaches, memory loss, loss of strength, numbness, or tremors  SKIN: No itching, burning, rashes, or lesions   MUSCULOSKELETAL: No joint pain or swelling; No muscle, back, or extremity pain  PSYCHIATRIC: No depression, anxiety, mood swings, or difficulty sleeping      Medications:  acetaminophen   Tablet .. 650 milliGRAM(s) Oral every 6 hours PRN  ALBUTerol    90 MICROgram(s) HFA Inhaler 2 Puff(s) Inhalation every 6 hours  ascorbic acid 1000 milliGRAM(s) Oral daily  aspirin enteric coated 81 milliGRAM(s) Oral daily  cholecalciferol 2000 Unit(s) Oral daily  dexAMETHasone     Tablet 6 milliGRAM(s) Oral daily  enoxaparin Injectable 40 milliGRAM(s) SubCutaneous daily  guaiFENesin   Syrup  (Sugar-Free) 100 milliGRAM(s) Oral every 6 hours PRN  insulin lispro (ADMELOG) corrective regimen sliding scale   SubCutaneous three times a day before meals  montelukast 10 milliGRAM(s) Oral at bedtime  pantoprazole    Tablet 40 milliGRAM(s) Oral before breakfast  zinc sulfate 220 milliGRAM(s) Oral daily      vent settings      Vital Signs Last 24 Hrs  T(C): 36.8 (19 Mar 2021 05:20), Max: 36.8 (19 Mar 2021 05:20)  T(F): 98.2 (19 Mar 2021 05:20), Max: 98.2 (19 Mar 2021 05:20)  HR: 66 (19 Mar 2021 05:20) (66 - 72)  BP: 135/73 (19 Mar 2021 05:20) (135/73 - 142/72)  BP(mean): --  RR: 18 (19 Mar 2021 05:20) (18 - 20)  SpO2: 90% (19 Mar 2021 05:20) (90% - 93%)              LABS:                        15.1   9.37  )-----------( 291      ( 19 Mar 2021 07:38 )             43.2     03-19    137  |  105  |  21<H>  ----------------------------<  97  3.7   |  22  |  0.75    Ca    8.3<L>      19 Mar 2021 07:38  Phos  2.6     03-19  Mg     2.2     03-19    TPro  6.7  /  Alb  2.5<L>  /  TBili  1.2  /  DBili  x   /  AST  62<H>  /  ALT  171<H>  /  AlkPhos  82  03-19          CAPILLARY BLOOD GLUCOSE      POCT Blood Glucose.: 99 mg/dL (19 Mar 2021 07:39)        CULTURES:  Culture Results:   No growth (03-15 @ 00:52)        Physical Examination:    General: No acute distress.      HEENT: Pupils equal, reactive to light.  Symmetric.    PULM: Clear to auscultation bilaterally, no significant sputum production    CVS: Regular rate and rhythm, no murmurs, rubs, or gallops    ABD: Soft, nondistended, nontender, normoactive bowel sounds, no masses    EXT: No edema, nontender    SKIN: Warm and well perfused, no rashes noted.    NEURO: Alert, oriented, interactive, nonfocal    RADIOLOGY REVIEWED ***    CRITICAL CARE TIME SPENT: 35 minutes   Patient is a 55y old  Male who presents with a chief complaint of SOB (19 Mar 2021 06:24)      Initial HPI on admission:  HPI:  55M, no PMH, PSH appy and moody 1990s presented 3/14 with x9 days worsening cough, subjective fevers, and SOB, with x2-3 days dysuria and central, non-radiating, constant CP. Additionally endorses HA, LH, and myaglias, and denies abdominal pain and n/v/d. Patient is a nonsmoker, lives alone, and denies sick contacts. In ED, T 99.1, HR 76, /65, RR 18, and SPO2 98%. Given discomfort of breathing, placed on 4L NC, with improvement. Labs notable for lymphopenia and neutrophilia despite WBC wnl, d-dimer 423, AST//114, lactate 3.3, and . Trop negative x1. +COVID. CXR notable for b/l infiltrates, L>R. Given x1 dose Tylenol and 1L IVF bolus in ED. Admitted to [facility] for management of COVID PNA.  (14 Mar 2021 16:07)      BRIEF HOSPITAL COURSE: Patient was admitted to medical floor for hypoxic respiratory failure s/t covid pneumonia, was initially on 4lpm NC, started on remdesivir and decadron however remdesivir discontinued since patient had antibodies. Patient continued to have increasing o2 requirements, now on 15lpm with sats in 90s. ICU consulted for increasing work of breathing. Assessed bedside, saturating 99% on 15lpm NRM however tachypneic to 30s. Spoke via  (ID no [id number])    PAST MEDICAL & SURGICAL HISTORY:  No pertinent past medical history    S/P appendectomy  1990s    S/P moody  1990s      Allergies    No Known Allergies    Intolerances      FAMILY HISTORY:    Social history reviewed: ***    Review of Systems:  CONSTITUTIONAL: No fever, chills, or fatigue  EYES: No eye pain, visual disturbances, or discharge  ENMT:  No difficulty hearing, tinnitus, vertigo; No sinus or throat pain  NECK: No pain or stiffness  RESPIRATORY: No cough, wheezing, chills or hemoptysis; No shortness of breath  CARDIOVASCULAR: No chest pain, palpitations, dizziness, or leg swelling  GASTROINTESTINAL: No abdominal or epigastric pain. No nausea, vomiting, or hematemesis; No diarrhea or constipation. No melena or hematochezia.  GENITOURINARY: No dysuria, frequency, hematuria, or incontinence  NEUROLOGICAL: No headaches, memory loss, loss of strength, numbness, or tremors  SKIN: No itching, burning, rashes, or lesions   MUSCULOSKELETAL: No joint pain or swelling; No muscle, back, or extremity pain  PSYCHIATRIC: No depression, anxiety, mood swings, or difficulty sleeping      Medications:  acetaminophen   Tablet .. 650 milliGRAM(s) Oral every 6 hours PRN  ALBUTerol    90 MICROgram(s) HFA Inhaler 2 Puff(s) Inhalation every 6 hours  ascorbic acid 1000 milliGRAM(s) Oral daily  aspirin enteric coated 81 milliGRAM(s) Oral daily  cholecalciferol 2000 Unit(s) Oral daily  dexAMETHasone     Tablet 6 milliGRAM(s) Oral daily  enoxaparin Injectable 40 milliGRAM(s) SubCutaneous daily  guaiFENesin   Syrup  (Sugar-Free) 100 milliGRAM(s) Oral every 6 hours PRN  insulin lispro (ADMELOG) corrective regimen sliding scale   SubCutaneous three times a day before meals  montelukast 10 milliGRAM(s) Oral at bedtime  pantoprazole    Tablet 40 milliGRAM(s) Oral before breakfast  zinc sulfate 220 milliGRAM(s) Oral daily      vent settings      Vital Signs Last 24 Hrs  T(C): 36.8 (19 Mar 2021 05:20), Max: 36.8 (19 Mar 2021 05:20)  T(F): 98.2 (19 Mar 2021 05:20), Max: 98.2 (19 Mar 2021 05:20)  HR: 66 (19 Mar 2021 05:20) (66 - 72)  BP: 135/73 (19 Mar 2021 05:20) (135/73 - 142/72)  BP(mean): --  RR: 18 (19 Mar 2021 05:20) (18 - 20)  SpO2: 90% (19 Mar 2021 05:20) (90% - 93%)              LABS:                        15.1   9.37  )-----------( 291      ( 19 Mar 2021 07:38 )             43.2     03-19    137  |  105  |  21<H>  ----------------------------<  97  3.7   |  22  |  0.75    Ca    8.3<L>      19 Mar 2021 07:38  Phos  2.6     03-19  Mg     2.2     03-19    TPro  6.7  /  Alb  2.5<L>  /  TBili  1.2  /  DBili  x   /  AST  62<H>  /  ALT  171<H>  /  AlkPhos  82  03-19          CAPILLARY BLOOD GLUCOSE      POCT Blood Glucose.: 99 mg/dL (19 Mar 2021 07:39)        CULTURES:  Culture Results:   No growth (03-15 @ 00:52)        Physical Examination:    General: No acute distress.      HEENT: Pupils equal, reactive to light.  Symmetric.    PULM: Clear to auscultation bilaterally, no significant sputum production    CVS: Regular rate and rhythm, no murmurs, rubs, or gallops    ABD: Soft, nondistended, nontender, normoactive bowel sounds, no masses    EXT: No edema, nontender    SKIN: Warm and well perfused, no rashes noted.    NEURO: Alert, oriented, interactive, nonfocal    RADIOLOGY REVIEWED ***    CRITICAL CARE TIME SPENT: 35 minutes   Patient is a 55y old  Male who presents with a chief complaint of SOB (19 Mar 2021 06:24)      Initial HPI on admission:  HPI:  55M, no PMH, PSH appy and moody 1990s presented 3/14 with x9 days worsening cough, subjective fevers, and SOB, with x2-3 days dysuria and central, non-radiating, constant CP. Additionally endorses HA, LH, and myaglias, and denies abdominal pain and n/v/d. Patient is a nonsmoker, lives alone, and denies sick contacts. In ED, T 99.1, HR 76, /65, RR 18, and SPO2 98%. Given discomfort of breathing, placed on 4L NC, with improvement. Labs notable for lymphopenia and neutrophilia despite WBC wnl, d-dimer 423, AST//114, lactate 3.3, and . Trop negative x1. +COVID. CXR notable for b/l infiltrates, L>R. Given x1 dose Tylenol and 1L IVF bolus in ED. Admitted to [facility] for management of COVID PNA.  (14 Mar 2021 16:07)      BRIEF HOSPITAL COURSE: Patient was admitted to medical floor for hypoxic respiratory failure s/t covid pneumonia, was initially on 4lpm NC, started on remdesivir and decadron however remdesivir discontinued since patient had antibodies. Patient continued to have increasing o2 requirements, now on 15lpm with sats in 90s. ICU consulted for increasing work of breathing. Assessed bedside, saturating 99% on 15lpm NRM however tachypneic to 30s. Spoke via  (ID no [id number]), reports mild difficulty breathing but has no more complaints.     PAST MEDICAL & SURGICAL HISTORY:  No pertinent past medical history    S/P appendectomy  1990s    S/P moody  1990s      Allergies    No Known Allergies    Intolerances      Review of Systems:  CONSTITUTIONAL: No fever, chills, or fatigue  EYES: No eye pain, visual disturbances, or discharge  ENMT:  No difficulty hearing, tinnitus, vertigo; No sinus or throat pain  NECK: No pain or stiffness  RESPIRATORY: No cough, wheezing, chills or hemoptysis; mild shortness of breath  CARDIOVASCULAR: No chest pain, palpitations, dizziness, or leg swelling  GASTROINTESTINAL: No abdominal or epigastric pain. No nausea, vomiting, or hematemesis; No diarrhea or constipation. No melena or hematochezia.  GENITOURINARY: No dysuria, frequency, hematuria, or incontinence  NEUROLOGICAL: No headaches, memory loss, loss of strength, numbness, or tremors  SKIN: No itching, burning, rashes, or lesions   MUSCULOSKELETAL: No joint pain or swelling; No muscle, back, or extremity pain  PSYCHIATRIC: No depression, anxiety, mood swings, or difficulty sleeping      Medications:  acetaminophen   Tablet .. 650 milliGRAM(s) Oral every 6 hours PRN  ALBUTerol    90 MICROgram(s) HFA Inhaler 2 Puff(s) Inhalation every 6 hours  ascorbic acid 1000 milliGRAM(s) Oral daily  aspirin enteric coated 81 milliGRAM(s) Oral daily  cholecalciferol 2000 Unit(s) Oral daily  dexAMETHasone     Tablet 6 milliGRAM(s) Oral daily  enoxaparin Injectable 40 milliGRAM(s) SubCutaneous daily  guaiFENesin   Syrup  (Sugar-Free) 100 milliGRAM(s) Oral every 6 hours PRN  insulin lispro (ADMELOG) corrective regimen sliding scale   SubCutaneous three times a day before meals  montelukast 10 milliGRAM(s) Oral at bedtime  pantoprazole    Tablet 40 milliGRAM(s) Oral before breakfast  zinc sulfate 220 milliGRAM(s) Oral daily      vent settings      Vital Signs Last 24 Hrs  T(C): 36.8 (19 Mar 2021 05:20), Max: 36.8 (19 Mar 2021 05:20)  T(F): 98.2 (19 Mar 2021 05:20), Max: 98.2 (19 Mar 2021 05:20)  HR: 66 (19 Mar 2021 05:20) (66 - 72)  BP: 135/73 (19 Mar 2021 05:20) (135/73 - 142/72)  BP(mean): --  RR: 18 (19 Mar 2021 05:20) (18 - 20)  SpO2: 90% (19 Mar 2021 05:20) (90% - 93%)              LABS:                        15.1   9.37  )-----------( 291      ( 19 Mar 2021 07:38 )             43.2     03-19    137  |  105  |  21<H>  ----------------------------<  97  3.7   |  22  |  0.75    Ca    8.3<L>      19 Mar 2021 07:38  Phos  2.6     03-19  Mg     2.2     03-19    TPro  6.7  /  Alb  2.5<L>  /  TBili  1.2  /  DBili  x   /  AST  62<H>  /  ALT  171<H>  /  AlkPhos  82  03-19          CAPILLARY BLOOD GLUCOSE      POCT Blood Glucose.: 99 mg/dL (19 Mar 2021 07:39)        CULTURES:  Culture Results:   No growth (03-15 @ 00:52)        Physical Examination:    General: Mild respiratory distress on 15lpm NRM    HEENT: Pupils equal, reactive to light.  Symmetric.    PULM: Clear to auscultation bilaterally, no significant sputum production    CVS: Regular rate and rhythm, no murmurs, rubs, or gallops    ABD: Soft, nondistended, nontender, normoactive bowel sounds, no masses    EXT: No edema, nontender    SKIN: Warm and well perfused, no rashes noted.    NEURO: Alert, oriented, interactive, nonfocal    RADIOLOGY REVIEWED < from: Xray Chest 1 View-PORTABLE IMMEDIATE (03.14.21 @ 13:14) >    INTERPRETATION:  Clinical Information: Dyspnea    Technique: AP chest image.    Comparison: None    Findings/  Impression: The heart is unremarkable. Large airspace opacity left mid and lower lung.        CRITICAL CARE TIME SPENT: 35 minutes

## 2021-03-19 NOTE — PROGRESS NOTE ADULT - SUBJECTIVE AND OBJECTIVE BOX
Pt is awake, alert, lying in bed in NAD. On NRM. Saturating 90%. ICU consulted.     INTERVAL HPI/OVERNIGHT EVENTS:      VITAL SIGNS:  T(F): 98.2 (03-19-21 @ 05:20)  HR: 66 (03-19-21 @ 05:20)  BP: 135/73 (03-19-21 @ 05:20)  RR: 18 (03-19-21 @ 05:20)  SpO2: 90% (03-19-21 @ 05:20)  Wt(kg): --  I&O's Detail          REVIEW OF SYSTEMS:    CONSTITUTIONAL:  No fevers, chills, sweats    HEENT:  Eyes:  No diplopia or blurred vision. ENT:  No earache, sore throat or runny nose.    CARDIOVASCULAR:  No pressure, squeezing, tightness, or heaviness about the chest; no palpitations.    RESPIRATORY:  Per HPI    GASTROINTESTINAL:  No abdominal pain, nausea, vomiting or diarrhea.    GENITOURINARY:  No dysuria, frequency or urgency.    NEUROLOGIC:  No paresthesias, fasciculations, seizures or weakness.    PSYCHIATRIC:  No disorder of thought or mood.      PHYSICAL EXAM:    Constitutional: Well developed and nourished  Eyes:Perrla  ENMT: normal  Neck:supple  Respiratory: good air entry  Cardiovascular: S1 S2 regular  Gastrointestinal: Soft, Non tender  Extremities: No edema  Vascular:normal  Neurological:Awake, alert,Ox3  Musculoskeletal:Normal      MEDICATIONS  (STANDING):  ALBUTerol    90 MICROgram(s) HFA Inhaler 2 Puff(s) Inhalation every 6 hours  ascorbic acid 1000 milliGRAM(s) Oral daily  aspirin enteric coated 81 milliGRAM(s) Oral daily  chlorhexidine 2% Cloths 1 Application(s) Topical <User Schedule>  cholecalciferol 2000 Unit(s) Oral daily  dexAMETHasone     Tablet 6 milliGRAM(s) Oral daily  enoxaparin Injectable 40 milliGRAM(s) SubCutaneous daily  insulin lispro (ADMELOG) corrective regimen sliding scale   SubCutaneous three times a day before meals  montelukast 10 milliGRAM(s) Oral at bedtime  pantoprazole    Tablet 40 milliGRAM(s) Oral before breakfast  zinc sulfate 220 milliGRAM(s) Oral daily    MEDICATIONS  (PRN):  acetaminophen   Tablet .. 650 milliGRAM(s) Oral every 6 hours PRN Temp greater or equal to 38C (100.4F), Moderate Pain (4 - 6)  guaiFENesin   Syrup  (Sugar-Free) 100 milliGRAM(s) Oral every 6 hours PRN Cough      Allergies    No Known Allergies    Intolerances        LABS:                        15.1   9.37  )-----------( 291      ( 19 Mar 2021 07:38 )             43.2     03-19    137  |  105  |  21<H>  ----------------------------<  97  3.7   |  22  |  0.75    Ca    8.3<L>      19 Mar 2021 07:38  Phos  2.6     03-19  Mg     2.2     03-19    TPro  6.7  /  Alb  2.5<L>  /  TBili  1.2  /  DBili  x   /  AST  62<H>  /  ALT  171<H>  /  AlkPhos  82  03-19    CAPILLARY BLOOD GLUCOSE      POCT Blood Glucose.: 99 mg/dL (19 Mar 2021 07:39)  POCT Blood Glucose.: 110 mg/dL (18 Mar 2021 21:52)  POCT Blood Glucose.: 120 mg/dL (18 Mar 2021 17:01)    pro-bnp -- 03-17 @ 10:55     d-dimer 151  03-17 @ 10:55  pro-bnp -- 03-14 @ 13:21     d-dimer 423  03-14 @ 13:21      RADIOLOGY & ADDITIONAL TESTS:    CXR:    Ct scan chest:    ekg;    echo:

## 2021-03-19 NOTE — CONSULT NOTE ADULT - CONVERSATION DETAILS
Explained need for MICU level of care for HFNC, which the patient agreed for. GOC discussed, dayne was hesitant to be on a ventilator and requested DNI but wanted CPR. ACLS protocol and futility of CPR without intubation in case of respiratory failure discussed in detail after which patient requested to be full code but reiterated his fear of being on a ventilator. Explained that his current need is for HFNC and team will only consider ventilator as an option if he fails NIV. Patient agrees will plan, FULL CODE status confirmed.

## 2021-03-19 NOTE — CONSULT NOTE ADULT - ASSESSMENT
ASSESSMENT AND PLAN:      =================== Neuro============================  Alert and oriented x         ================= Cardiovascular==========================    ================- Pulm=================================    ==================ID===================================      ================= Nephro================================    =================GI====================================    ================ Heme==================================  No issues.    =================Endocrine===============================    ================= Skin/Catheters============================  No rashes. Peripheral IV lines.     - =================Prophylaxis =============================  Lovenox for DVT proph  Protonix for  GI proph    ==================GOC==================================   FULL CODE ASSESSMENT AND PLAN:  55M, no PMH, PSH appy and moody 1990s presented 3/14 with x9 days worsening cough, subjective fevers, and SOB, with x2-3 days dysuria and central, non-radiating, constant CP. Admitted to [facility] for acute hypoxic respiratory failure s/t covid pneumonia, ICU consulted for increasing work of breathing on 15lpm NRM.     1. Acute hypoxic respiratory failure  2. Covid pneumonia  3. Transaminitis  4. Prediabetes  5. Abnormal TSH    =================== Neuro============================  Alert and oriented x 3     ================= Cardiovascular==========================    ================- Pulm=================================    ==================ID===================================      ================= Nephro================================    =================GI====================================    ================ Heme==================================  Elevated d-dimer:  -    =================Endocrine===============================    ================= Skin/Catheters============================  No rashes. Peripheral IV lines.     - =================Prophylaxis =============================  Lovenox for DVT proph  Protonix for  GI proph    ==================GOC==================================   FULL CODE ASSESSMENT AND PLAN:  55M, no PMH, PSH appy and moody 1990s presented 3/14 with x9 days worsening cough, subjective fevers, and SOB, with x2-3 days dysuria and central, non-radiating, constant CP. Admitted to [facility] for acute hypoxic respiratory failure s/t covid pneumonia, ICU consulted for increasing work of breathing on 15lpm NRM.     1. Acute hypoxic respiratory failure  2. Covid pneumonia  3. Transaminitis  4. Prediabetes  5. Abnormal TSH    =================== Neuro============================  Alert and oriented x 3     ================= Cardiovascular==========================  Elevated BP:  -Noted to have multiple elevated bp readings, s/o HTN  -continue to monitor, may consider low dose acei/arb    ================- Pulm=================================  Acute hypoxic respiratory failure:  -secondary to covid pneumonia  -cxr on admission with b/l infiltrates,   -d-dimer initially elevated on presentation, now wnl  -remdesivir was discontinued for positive Igs  -continue decadron, add albuterol prn  -WOB on NRM, transfer to ICU for HFNC  -f/u repeat CXR, procalcitonin    ==================ID===================================  Covid pneumonia  -plan as above    ================= Nephro================================  -monitor lytes, SCr, I/Os    =================GI====================================  Transaminitis:  - and  on presentation, improved to 62/171   -suspected to be d/t covid,   -continue to monitor, f/u hepatitis panel    ================ Heme==================================  Elevated d-dimer:  -d-dimer 423 on presentation, now wnl  -suspected s/t covid,   -continue prophylactic lovenox    =================Endocrine===============================  Prediabetes:  -a1c noted 5.8,   -continue HSS  -monitor fs stacey while on steroids    Abnormal TSH:  -TSH level noted 0.26,   -will send repeat TSH and Free T4    ================= Skin/Catheters============================  No rashes. Peripheral IV lines.     - =================Prophylaxis =============================  Lovenox for DVT proph  Protonix for  GI proph    ==================GOC==================================   FULL CODE

## 2021-03-20 LAB
ALBUMIN SERPL ELPH-MCNC: 2.5 G/DL — LOW (ref 3.5–5)
ALP SERPL-CCNC: 82 U/L — SIGNIFICANT CHANGE UP (ref 40–120)
ALT FLD-CCNC: 170 U/L DA — HIGH (ref 10–60)
ANION GAP SERPL CALC-SCNC: 10 MMOL/L — SIGNIFICANT CHANGE UP (ref 5–17)
AST SERPL-CCNC: 44 U/L — HIGH (ref 10–40)
BASOPHILS # BLD AUTO: 0.05 K/UL — SIGNIFICANT CHANGE UP (ref 0–0.2)
BASOPHILS NFR BLD AUTO: 0.4 % — SIGNIFICANT CHANGE UP (ref 0–2)
BILIRUB SERPL-MCNC: 1.1 MG/DL — SIGNIFICANT CHANGE UP (ref 0.2–1.2)
BUN SERPL-MCNC: 25 MG/DL — HIGH (ref 7–18)
CALCIUM SERPL-MCNC: 8.9 MG/DL — SIGNIFICANT CHANGE UP (ref 8.4–10.5)
CHLORIDE SERPL-SCNC: 106 MMOL/L — SIGNIFICANT CHANGE UP (ref 96–108)
CO2 SERPL-SCNC: 21 MMOL/L — LOW (ref 22–31)
CREAT SERPL-MCNC: 0.74 MG/DL — SIGNIFICANT CHANGE UP (ref 0.5–1.3)
CRP SERPL-MCNC: 41 MG/L — HIGH
D DIMER BLD IA.RAPID-MCNC: 229 NG/ML DDU — SIGNIFICANT CHANGE UP
EOSINOPHIL # BLD AUTO: 0.01 K/UL — SIGNIFICANT CHANGE UP (ref 0–0.5)
EOSINOPHIL NFR BLD AUTO: 0.1 % — SIGNIFICANT CHANGE UP (ref 0–6)
FERRITIN SERPL-MCNC: 1984 NG/ML — HIGH (ref 30–400)
GLUCOSE BLDC GLUCOMTR-MCNC: 119 MG/DL — HIGH (ref 70–99)
GLUCOSE BLDC GLUCOMTR-MCNC: 127 MG/DL — HIGH (ref 70–99)
GLUCOSE BLDC GLUCOMTR-MCNC: 131 MG/DL — HIGH (ref 70–99)
GLUCOSE BLDC GLUCOMTR-MCNC: 96 MG/DL — SIGNIFICANT CHANGE UP (ref 70–99)
GLUCOSE SERPL-MCNC: 104 MG/DL — HIGH (ref 70–99)
HCT VFR BLD CALC: 47.1 % — SIGNIFICANT CHANGE UP (ref 39–50)
HGB BLD-MCNC: 15.7 G/DL — SIGNIFICANT CHANGE UP (ref 13–17)
IMM GRANULOCYTES NFR BLD AUTO: 6.4 % — HIGH (ref 0–1.5)
LDH SERPL L TO P-CCNC: 667 U/L — HIGH (ref 120–225)
LYMPHOCYTES # BLD AUTO: 1.22 K/UL — SIGNIFICANT CHANGE UP (ref 1–3.3)
LYMPHOCYTES # BLD AUTO: 10.5 % — LOW (ref 13–44)
MAGNESIUM SERPL-MCNC: 2.3 MG/DL — SIGNIFICANT CHANGE UP (ref 1.6–2.6)
MCHC RBC-ENTMCNC: 30.8 PG — SIGNIFICANT CHANGE UP (ref 27–34)
MCHC RBC-ENTMCNC: 33.3 GM/DL — SIGNIFICANT CHANGE UP (ref 32–36)
MCV RBC AUTO: 92.4 FL — SIGNIFICANT CHANGE UP (ref 80–100)
MONOCYTES # BLD AUTO: 0.68 K/UL — SIGNIFICANT CHANGE UP (ref 0–0.9)
MONOCYTES NFR BLD AUTO: 5.8 % — SIGNIFICANT CHANGE UP (ref 2–14)
NEUTROPHILS # BLD AUTO: 8.95 K/UL — HIGH (ref 1.8–7.4)
NEUTROPHILS NFR BLD AUTO: 76.8 % — SIGNIFICANT CHANGE UP (ref 43–77)
NRBC # BLD: 0 /100 WBCS — SIGNIFICANT CHANGE UP (ref 0–0)
PHOSPHATE SERPL-MCNC: 3.5 MG/DL — SIGNIFICANT CHANGE UP (ref 2.5–4.5)
PLATELET # BLD AUTO: 357 K/UL — SIGNIFICANT CHANGE UP (ref 150–400)
POTASSIUM SERPL-MCNC: 4.4 MMOL/L — SIGNIFICANT CHANGE UP (ref 3.5–5.3)
POTASSIUM SERPL-SCNC: 4.4 MMOL/L — SIGNIFICANT CHANGE UP (ref 3.5–5.3)
PROCALCITONIN SERPL-MCNC: 0.08 NG/ML — SIGNIFICANT CHANGE UP (ref 0.02–0.1)
PROT SERPL-MCNC: 7 G/DL — SIGNIFICANT CHANGE UP (ref 6–8.3)
RBC # BLD: 5.1 M/UL — SIGNIFICANT CHANGE UP (ref 4.2–5.8)
RBC # FLD: 12.9 % — SIGNIFICANT CHANGE UP (ref 10.3–14.5)
SODIUM SERPL-SCNC: 137 MMOL/L — SIGNIFICANT CHANGE UP (ref 135–145)
TSH SERPL-MCNC: 0.37 UU/ML — SIGNIFICANT CHANGE UP (ref 0.34–4.82)
WBC # BLD: 11.66 K/UL — HIGH (ref 3.8–10.5)
WBC # FLD AUTO: 11.66 K/UL — HIGH (ref 3.8–10.5)

## 2021-03-20 RX ADMIN — Medication 81 MILLIGRAM(S): at 11:30

## 2021-03-20 RX ADMIN — ALBUTEROL 2 PUFF(S): 90 AEROSOL, METERED ORAL at 04:20

## 2021-03-20 RX ADMIN — ALBUTEROL 2 PUFF(S): 90 AEROSOL, METERED ORAL at 21:00

## 2021-03-20 RX ADMIN — Medication 1000 MILLIGRAM(S): at 11:30

## 2021-03-20 RX ADMIN — CHLORHEXIDINE GLUCONATE 1 APPLICATION(S): 213 SOLUTION TOPICAL at 05:52

## 2021-03-20 RX ADMIN — ALBUTEROL 2 PUFF(S): 90 AEROSOL, METERED ORAL at 16:39

## 2021-03-20 RX ADMIN — Medication 6 MILLIGRAM(S): at 05:57

## 2021-03-20 RX ADMIN — ENOXAPARIN SODIUM 40 MILLIGRAM(S): 100 INJECTION SUBCUTANEOUS at 11:31

## 2021-03-20 RX ADMIN — ALBUTEROL 2 PUFF(S): 90 AEROSOL, METERED ORAL at 09:00

## 2021-03-20 NOTE — PROGRESS NOTE ADULT - ATTENDING COMMENTS
IMP: This is a 55 yr old  man , non smoker with  moody 1990s presented 3/14 with x9 days worsening cough, subjective fevers, and SOB, with x2-3 days dysuria and central, non-radiating, constant CP. Admitted to medicine unit  for acute hypoxic respiratory failure secondary to pna from covid-19 infection .  ICU consulted for increasing work of breathing on 15lpm NRM.     Assessment:  1. Acute hypoxic respiratory failure  2 Covid-19 infection   3. Transaminitis  4. Prediabetes    Plan   -isolation : contact and air borne   -HFNC support needed to maintain O2 sat>90%  -monitor biomarkers daily  -dvt/gi prophy  -continue decadron 6 mg /day x 10 days total then taper  -not a candidate for remdesivir dut to covid-19 antibodies and elevated LFT  -not a candidate for Tociluzimab due to elevated LFT  -hemodynamic monitoring   -self prone as tolerated   -Oral diet  -low threshold for intubation

## 2021-03-20 NOTE — PROGRESS NOTE ADULT - SUBJECTIVE AND OBJECTIVE BOX
Patient is a 55y old  Male who presents with a chief complaint of SOB (20 Mar 2021 02:29)    pt seen in ed [  ], reg med floor [  x ], bed [ x ], chair at bedside [   ], a+o x3 [ x ], lethargic [  ],    nad [ x ]        Allergies    No Known Allergies        Vitals    T(F): 97 (03-20-21 @ 04:00), Max: 98 (03-19-21 @ 12:10)  HR: 75 (03-20-21 @ 04:32) (53 - 98)  BP: 129/72 (03-20-21 @ 04:00) (115/70 - 145/69)  RR: 25 (03-20-21 @ 04:00) (18 - 33)  SpO2: 92% (03-20-21 @ 04:32) (84% - 96%)  Wt(kg): --  CAPILLARY BLOOD GLUCOSE      POCT Blood Glucose.: 96 mg/dL (20 Mar 2021 06:24)      Labs                          15.7   11.66 )-----------( 357      ( 20 Mar 2021 04:23 )             47.1       03-20    137  |  106  |  25<H>  ----------------------------<  104<H>  4.4   |  21<L>  |  0.74    Ca    8.9      20 Mar 2021 04:23  Phos  3.5     03-20  Mg     2.3     03-20    TPro  7.0  /  Alb  2.5<L>  /  TBili  1.1  /  DBili  x   /  AST  44<H>  /  ALT  170<H>  /  AlkPhos  82  03-20            .Urine Clean Catch (Midstream)  03-15 @ 00:52   No growth  --  --          Radiology Results      Meds    MEDICATIONS  (STANDING):  ALBUTerol    90 MICROgram(s) HFA Inhaler 2 Puff(s) Inhalation every 6 hours  ascorbic acid 1000 milliGRAM(s) Oral daily  aspirin enteric coated 81 milliGRAM(s) Oral daily  chlorhexidine 2% Cloths 1 Application(s) Topical <User Schedule>  dexAMETHasone     Tablet 6 milliGRAM(s) Oral daily  enoxaparin Injectable 40 milliGRAM(s) SubCutaneous daily  insulin lispro (ADMELOG) corrective regimen sliding scale   SubCutaneous three times a day before meals  pantoprazole    Tablet 40 milliGRAM(s) Oral before breakfast      MEDICATIONS  (PRN):  acetaminophen   Tablet .. 650 milliGRAM(s) Oral every 6 hours PRN Temp greater or equal to 38C (100.4F), Moderate Pain (4 - 6)      Physical Exam    Neuro :  no focal deficits  Respiratory: CTA B/L  CV: RRR, S1S2, no murmurs,   Abdominal: Soft, NT, ND +BS,  Extremities: No edema, + peripheral pulses    ASSESSMENT    Hypoxemia 2nd to covid pna  h/o appendectomy  cholecystectomy        PLAN    contact and airborne isolation  d/c remdesevir given covid ab positive noted   cont dexamethasone  pepcid, singulair, vit c, vit d, zinc  cont albuterol inhaler   pulm f/u  procalcitonin, D-dimer, crp, ldh, ferritin, lactate noted ,    cont tylenol prn,   cont robitussin prn   O2 sat (90% - 93%) on nrb 15L  O2 via nrbm   pt low threshould for intubation  pt will likely benefit from high miguel ángel O2  icu cons  cont aspirin 81mg daily  cont current meds       Patient is a 55y old  Male who presents with a chief complaint of SOB (20 Mar 2021 02:29)    pt seen in icu [ x ], reg med floor [  ], bed [ x ], chair at bedside [   ], a+o x3 [ x ], lethargic [  ],    nad [ x ]        Allergies    No Known Allergies        Vitals    T(F): 97 (03-20-21 @ 04:00), Max: 98 (03-19-21 @ 12:10)  HR: 75 (03-20-21 @ 04:32) (53 - 98)  BP: 129/72 (03-20-21 @ 04:00) (115/70 - 145/69)  RR: 25 (03-20-21 @ 04:00) (18 - 33)  SpO2: 92% (03-20-21 @ 04:32) (84% - 96%)  Wt(kg): --  CAPILLARY BLOOD GLUCOSE      POCT Blood Glucose.: 96 mg/dL (20 Mar 2021 06:24)      Labs                          15.7   11.66 )-----------( 357      ( 20 Mar 2021 04:23 )             47.1       03-20    137  |  106  |  25<H>  ----------------------------<  104<H>  4.4   |  21<L>  |  0.74    Ca    8.9      20 Mar 2021 04:23  Phos  3.5     03-20  Mg     2.3     03-20    TPro  7.0  /  Alb  2.5<L>  /  TBili  1.1  /  DBili  x   /  AST  44<H>  /  ALT  170<H>  /  AlkPhos  82  03-20            .Urine Clean Catch (Midstream)  03-15 @ 00:52   No growth  --  --          Radiology Results      Meds    MEDICATIONS  (STANDING):  ALBUTerol    90 MICROgram(s) HFA Inhaler 2 Puff(s) Inhalation every 6 hours  ascorbic acid 1000 milliGRAM(s) Oral daily  aspirin enteric coated 81 milliGRAM(s) Oral daily  chlorhexidine 2% Cloths 1 Application(s) Topical <User Schedule>  dexAMETHasone     Tablet 6 milliGRAM(s) Oral daily  enoxaparin Injectable 40 milliGRAM(s) SubCutaneous daily  insulin lispro (ADMELOG) corrective regimen sliding scale   SubCutaneous three times a day before meals  pantoprazole    Tablet 40 milliGRAM(s) Oral before breakfast      MEDICATIONS  (PRN):  acetaminophen   Tablet .. 650 milliGRAM(s) Oral every 6 hours PRN Temp greater or equal to 38C (100.4F), Moderate Pain (4 - 6)      Physical Exam    Neuro :  no focal deficits  Respiratory: CTA B/L  CV: RRR, S1S2, no murmurs,   Abdominal: Soft, NT, ND +BS,  Extremities: No edema, + peripheral pulses    ASSESSMENT    Hypoxemia 2nd to covid pna   transaminitis  prediabetes  h/o appendectomy  cholecystectomy        PLAN    contact and airborne isolation  d/c remdesevir given covid ab positive noted   cont dexamethasone  cont asa, vit c,    cont albuterol inhaler   pulm f/u  procalcitonin, D-dimer, crp, ldh, ferritin, lactate noted ,    cont tylenol prn,   cont robitussin prn   O2 sat (84% - 96%) hi miguel ángel  O2 via hi miguel ángel  pt low threshould for intubation  pt will likely benefit from high miguel ángel O2  icu cons noted  f/u acute hepatitis panel  lispro ss  cont current meds

## 2021-03-20 NOTE — DIETITIAN INITIAL EVALUATION ADULT. - PERTINENT LABORATORY DATA
03-20 Na137 mmol/L Glu 104 mg/dL<H> K+ 4.4 mmol/L Cr  0.74 mg/dL BUN 25 mg/dL<H> 03-20 Phos 3.5 mg/dL 03-20 Alb 2.5 g/dL<L> 03-15 Chol 102 mg/dL LDL --    HDL 37 mg/dL<L> Trig 100 mg/dL, A1C 5.8%

## 2021-03-20 NOTE — PROGRESS NOTE ADULT - ASSESSMENT
Assessment and Recommendation:   Problem/Recommendation - 1:  Problem: COVID-19. Recommendation: isolation precautions  oxygen supp - on NRM was with increased WOB, would benefit from HFNC  ICU consulted; patient transferred to ICU for further management.   Monitor oxygen sat  Check LFT, LDH, CRP, D-Dimer, Ferritin and procalcitonin  Vit C, D and zinc supp  Montelukast 10 mgs po Qhs  IV steroids.  Management per ICU   CXR noted 3/19.  Low threshold for intubation   DVT and GI PPX     Problem/Recommendation - 2:  ·  Problem: UTI (urinary tract infection).  Recommendation: check pending culture  Antibiotics.     Problem/Recommendation - 3:  ·  Problem: Chest pain.  Recommendation: likely related to Covid-19 infection.     Problem/Recommendation - 4:  ·  Problem: Transaminitis.  Recommendation: monitor LFTs  GI F/U

## 2021-03-20 NOTE — DIETITIAN INITIAL EVALUATION ADULT. - SIGNS/SYMPTOMS
Inadequate oral intake PTA, inhouse 25% & high flow NC Inadequate oral intake PTA, inhouse 25% & on high flow NC

## 2021-03-20 NOTE — DIETITIAN INITIAL EVALUATION ADULT. - PROBLEM SELECTOR PLAN 1
presented 3/14 with x9 days worsening cough, subjective fevers, and SOB  -in ED, T 99.1, HR 76, /65, RR 18, and SPO2 98% RA  -s/p x1 dose Tylenol and 1L IVF bolus in ED  -requiring 4L NC for ease of breathing in ED  -COVID +PCR, fu Ab  -labs with lymphopenia and neutrophilia despite WBC wnl, d-dimer 423, AST//114, consistent with COVID  -lactate 3.3, and , possibly 2/2 prolonged poor PO intake in setting of acute illness, fu repeat lactate after bolus ot make sure downtrending  -CXR notable for b/l infiltrates, L>R  -fu ABG to assess disease severity  -remdesivir (Dr. Villalba approved) 3/14-18, Decadron 3/14-23 with PPI and HSS while on steroids  -supportive care with Tylenol, robitussin, albuterol  -fu LDH, CRP, ferritin, procal in am, repeat d-dimer 3/17, monitor LFTs with daily CMP

## 2021-03-20 NOTE — DIETITIAN INITIAL EVALUATION ADULT. - OTHER INFO
Patient from home lives alone, on airborne precaution, Patient from home lives alone, on airborne precaution, in isolation, COVID+, unable to see, observed pt. through room glass door, asleep & breakfast tray "untouched, on high flow nasal cannula & non-rebreather mask, per flow sheet intake 25% on 3/18 noted, d/w PCA/nursing encourage po intake, Pulmonary/team following. Patient from home lives alone, on airborne precaution, in isolation, COVID+, unable to see, observed pt. through room glass door, asleep & breakfast tray "untouched, on high flow nasal cannula & non-rebreather mask, per flow sheet intake 25% on 3/18 noted, d/w PCA/nursing encourage po intake, Pulmonary/team following, no edema, skin intact.

## 2021-03-20 NOTE — PROGRESS NOTE ADULT - SUBJECTIVE AND OBJECTIVE BOX
Pt is awake, alert, lying in bed. Was transferred to ICU due to work of breathing on NRM 15L. Would benefit from high flow NC.     INTERVAL HPI/OVERNIGHT EVENTS:      VITAL SIGNS:  T(F): 97.4 (03-20-21 @ 10:00)  HR: 74 (03-20-21 @ 12:00)  BP: 108/70 (03-20-21 @ 12:00)  RR: 28 (03-20-21 @ 12:00)  SpO2: 92% (03-20-21 @ 12:00)  Wt(kg): --  I&O's Detail    19 Mar 2021 07:01  -  20 Mar 2021 07:00  --------------------------------------------------------  IN:  Total IN: 0 mL    OUT:    Voided (mL): 225 mL  Total OUT: 225 mL    Total NET: -225 mL              REVIEW OF SYSTEMS:    CONSTITUTIONAL:  No fevers, chills, sweats    HEENT:  Eyes:  No diplopia or blurred vision. ENT:  No earache, sore throat or runny nose.    CARDIOVASCULAR:  No pressure, squeezing, tightness, or heaviness about the chest; no palpitations.    RESPIRATORY:  Per HPI    GASTROINTESTINAL:  No abdominal pain, nausea, vomiting or diarrhea.    GENITOURINARY:  No dysuria, frequency or urgency.    NEUROLOGIC:  No paresthesias, fasciculations, seizures or weakness.    PSYCHIATRIC:  No disorder of thought or mood.      PHYSICAL EXAM:    Constitutional: Well developed and nourished  Eyes:Perrla  ENMT: normal  Neck:supple  Respiratory: good air entry  Cardiovascular: S1 S2 regular  Gastrointestinal: Soft, Non tender  Extremities: No edema  Vascular:normal  Neurological:Awake, alert,Ox3  Musculoskeletal:Normal      MEDICATIONS  (STANDING):  ALBUTerol    90 MICROgram(s) HFA Inhaler 2 Puff(s) Inhalation every 6 hours  ascorbic acid 1000 milliGRAM(s) Oral daily  aspirin enteric coated 81 milliGRAM(s) Oral daily  chlorhexidine 2% Cloths 1 Application(s) Topical <User Schedule>  dexAMETHasone     Tablet 6 milliGRAM(s) Oral daily  enoxaparin Injectable 40 milliGRAM(s) SubCutaneous daily  insulin lispro (ADMELOG) corrective regimen sliding scale   SubCutaneous three times a day before meals  pantoprazole    Tablet 40 milliGRAM(s) Oral before breakfast    MEDICATIONS  (PRN):  acetaminophen   Tablet .. 650 milliGRAM(s) Oral every 6 hours PRN Temp greater or equal to 38C (100.4F), Moderate Pain (4 - 6)      Allergies    No Known Allergies    Intolerances        LABS:                        15.7   11.66 )-----------( 357      ( 20 Mar 2021 04:23 )             47.1     03-20    137  |  106  |  25<H>  ----------------------------<  104<H>  4.4   |  21<L>  |  0.74    Ca    8.9      20 Mar 2021 04:23  Phos  3.5     03-20  Mg     2.3     03-20    TPro  7.0  /  Alb  2.5<L>  /  TBili  1.1  /  DBili  x   /  AST  44<H>  /  ALT  170<H>  /  AlkPhos  82  03-20        ABG - ( 19 Mar 2021 16:06 )  pH, Arterial: 7.47  pH, Blood: x     /  pCO2: 30    /  pO2: 77    / HCO3: 21    / Base Excess: -0.6  /  SaO2: 95                    CAPILLARY BLOOD GLUCOSE      POCT Blood Glucose.: 127 mg/dL (20 Mar 2021 11:10)  POCT Blood Glucose.: 96 mg/dL (20 Mar 2021 06:24)  POCT Blood Glucose.: 121 mg/dL (19 Mar 2021 23:48)  POCT Blood Glucose.: 134 mg/dL (19 Mar 2021 16:50)    pro-bnp -- 03-20 @ 04:23     d-dimer 229  03-20 @ 04:23  pro-bnp -- 03-17 @ 10:55     d-dimer 151  03-17 @ 10:55  pro-bnp -- 03-14 @ 13:21     d-dimer 423  03-14 @ 13:21      RADIOLOGY & ADDITIONAL TESTS:    CXR:    < from: Xray Chest 1 View- PORTABLE-Urgent (Xray Chest 1 View- PORTABLE-Urgent .) (03.19.21 @ 17:33) >  IMPRESSION:   Pneumomediastinum..  RIGHT persistent bilateral  multifocal and diffuse ill-defined airspace consolidations.    < end of copied text >  Ct scan chest:    ekg;    echo:

## 2021-03-20 NOTE — DIETITIAN NUTRITION RISK NOTIFICATION - TREATMENT: THE FOLLOWING DIET HAS BEEN RECOMMENDED
Diet, Regular:   Supplement Feeding Modality:  Oral  Ensure Enlive Cans or Servings Per Day:  1       Frequency:  Two Times a day (03-14-21 @ 17:09) [Active]

## 2021-03-20 NOTE — PROGRESS NOTE ADULT - SUBJECTIVE AND OBJECTIVE BOX
INTERVAL HPI/OVERNIGHT EVENTS: no acute event overnight    Pulmonary:  ALBUTerol    90 MICROgram(s) HFA Inhaler 2 Puff(s) Inhalation every 6 hours    Hematalogic:  aspirin enteric coated 81 milliGRAM(s) Oral daily  enoxaparin Injectable 40 milliGRAM(s) SubCutaneous daily    Other:  acetaminophen   Tablet .. 650 milliGRAM(s) Oral every 6 hours PRN  ascorbic acid 1000 milliGRAM(s) Oral daily  chlorhexidine 2% Cloths 1 Application(s) Topical <User Schedule>  dexAMETHasone     Tablet 6 milliGRAM(s) Oral daily  insulin lispro (ADMELOG) corrective regimen sliding scale   SubCutaneous three times a day before meals  pantoprazole    Tablet 40 milliGRAM(s) Oral before breakfast    acetaminophen   Tablet .. 650 milliGRAM(s) Oral every 6 hours PRN  ALBUTerol    90 MICROgram(s) HFA Inhaler 2 Puff(s) Inhalation every 6 hours  ascorbic acid 1000 milliGRAM(s) Oral daily  aspirin enteric coated 81 milliGRAM(s) Oral daily  chlorhexidine 2% Cloths 1 Application(s) Topical <User Schedule>  dexAMETHasone     Tablet 6 milliGRAM(s) Oral daily  enoxaparin Injectable 40 milliGRAM(s) SubCutaneous daily  insulin lispro (ADMELOG) corrective regimen sliding scale   SubCutaneous three times a day before meals  pantoprazole    Tablet 40 milliGRAM(s) Oral before breakfast    Drug Dosing Weight  Height (cm): 167.6 (14 Mar 2021 12:03)  Weight (kg): 83.869 (14 Mar 2021 12:03)  BMI (kg/m2): 29.9 (14 Mar 2021 12:03)  BSA (m2): 1.93 (14 Mar 2021 12:03)    CENTRAL LINE: [ ] YES [x ] NO  LOCATION:   DATE INSERTED:  REMOVE: [ ] YES [ ] NO  EXPLAIN:    RIVERA: [ ] YES [ ] NO    DATE INSERTED:  REMOVE:  [ ] YES [ ] NO  EXPLAIN:    A-LINE:  [ ] YES [x ] NO  LOCATION:   DATE INSERTED:  REMOVE:  [ ] YES [ ] NO  EXPLAIN:    PMH -reviewed admission note, no change since admission    ICU Vital Signs Last 24 Hrs  T(C): 36 (20 Mar 2021 00:00), Max: 36.8 (19 Mar 2021 05:20)  T(F): 96.8 (20 Mar 2021 00:00), Max: 98.2 (19 Mar 2021 05:20)  HR: 62 (20 Mar 2021 01:00) (53 - 98)  BP: 120/74 (20 Mar 2021 01:00) (115/70 - 145/69)  BP(mean): 84 (20 Mar 2021 01:00) (80 - 89)  ABP: --  ABP(mean): --  RR: 21 (20 Mar 2021 01:00) (18 - 33)  SpO2: 95% (20 Mar 2021 01:00) (84% - 96%)      ABG - ( 19 Mar 2021 16:06 )  pH, Arterial: 7.47  pH, Blood: x     /  pCO2: 30    /  pO2: 77    / HCO3: 21    / Base Excess: -0.6  /  SaO2: 95                            PHYSICAL EXAM:    General: Mild respiratory distress on 15lpm NRM  HEENT: Pupils equal, reactive to light.  Symmetric.  PULM: Clear to auscultation bilaterally, no significant sputum production  CVS: Regular rate and rhythm, no murmurs, rubs, or gallops  ABD: Soft, nondistended, nontender, normoactive bowel sounds, no masses  EXT: No edema, nontender  SKIN: Warm and well perfused, no rashes noted.  NEURO: Alert, oriented, interactive, nonfocal    LABS:  CBC Full  -  ( 19 Mar 2021 07:38 )  WBC Count : 9.37 K/uL  RBC Count : 4.69 M/uL  Hemoglobin : 15.1 g/dL  Hematocrit : 43.2 %  Platelet Count - Automated : 291 K/uL  Mean Cell Volume : 92.1 fl  Mean Cell Hemoglobin : 32.2 pg  Mean Cell Hemoglobin Concentration : 35.0 gm/dL  Auto Neutrophil # : x  Auto Lymphocyte # : x  Auto Monocyte # : x  Auto Eosinophil # : x  Auto Basophil # : x  Auto Neutrophil % : x  Auto Lymphocyte % : x  Auto Monocyte % : x  Auto Eosinophil % : x  Auto Basophil % : x    03-19    137  |  105  |  21<H>  ----------------------------<  97  3.7   |  22  |  0.75    Ca    8.3<L>      19 Mar 2021 07:38  Phos  2.6     03-19  Mg     2.2     03-19    TPro  6.7  /  Alb  2.5<L>  /  TBili  1.2  /  DBili  x   /  AST  62<H>  /  ALT  171<H>  /  AlkPhos  82  03-19      GOALS OF CARE DISCUSSION WITH PATIENT/FAMILY/PROXY: full code    CRITICAL CARE TIME SPENT: 35 minutes

## 2021-03-20 NOTE — PROGRESS NOTE ADULT - ASSESSMENT
ASSESSMENT AND PLAN:  55M, no PMH, PSH appy and moody 1990s presented 3/14 with x9 days worsening cough, subjective fevers, and SOB, with x2-3 days dysuria and central, non-radiating, constant CP. Admitted to [facility] for acute hypoxic respiratory failure s/t covid pneumonia, ICU consulted for increasing work of breathing on 15lpm NRM.     1. Acute hypoxic respiratory failure  2. Covid pneumonia  3. Transaminitis  4. Prediabetes  5. Abnormal TSH    =================== Neuro============================  Alert and oriented x 3     ================= Cardiovascular==========================  Elevated BP:  -Noted to have multiple elevated bp readings, s/o HTN  -continue to monitor, may consider low dose acei/arb    ================- Pulm=================================  Acute hypoxic respiratory failure:  -secondary to covid pneumonia  -cxr on admission with b/l infiltrates,   -d-dimer initially elevated on presentation, now wnl  -remdesivir was discontinued for positive Igs  -continue decadron, add albuterol prn  -WOB on NRM, transfer to ICU for HFNC  -f/u repeat CXR, procalcitonin    ==================ID===================================  Covid pneumonia  -plan as above    ================= Nephro================================  -monitor lytes, SCr, I/Os    =================GI====================================  Transaminitis:  - and  on presentation, improved to 62/171   -suspected to be d/t covid,   -continue to monitor, f/u hepatitis panel    ================ Heme==================================  Elevated d-dimer:  -d-dimer 423 on presentation, now wnl  -suspected s/t covid,   -continue prophylactic lovenox    =================Endocrine===============================  Prediabetes:  -a1c noted 5.8,   -continue HSS  -monitor fs stacey while on steroids    Abnormal TSH:  -TSH level noted 0.26,   -will send repeat TSH and Free T4    ================= Skin/Catheters============================  No rashes. Peripheral IV lines.     - =================Prophylaxis =============================  Lovenox for DVT proph  Protonix for  GI proph    ==================GOC==================================   FULL CODE

## 2021-03-20 NOTE — DIETITIAN INITIAL EVALUATION ADULT. - PERTINENT MEDS FT
MEDICATIONS  (STANDING):  ALBUTerol    90 MICROgram(s) HFA Inhaler 2 Puff(s) Inhalation every 6 hours  ascorbic acid 1000 milliGRAM(s) Oral daily  aspirin enteric coated 81 milliGRAM(s) Oral daily  chlorhexidine 2% Cloths 1 Application(s) Topical <User Schedule>  dexAMETHasone     Tablet 6 milliGRAM(s) Oral daily  enoxaparin Injectable 40 milliGRAM(s) SubCutaneous daily  insulin lispro (ADMELOG) corrective regimen sliding scale   SubCutaneous three times a day before meals  pantoprazole    Tablet 40 milliGRAM(s) Oral before breakfast    MEDICATIONS  (PRN):  acetaminophen   Tablet .. 650 milliGRAM(s) Oral every 6 hours PRN Temp greater or equal to 38C (100.4F), Moderate Pain (4 - 6)

## 2021-03-21 LAB
CRP SERPL-MCNC: 43 MG/L — HIGH
FERRITIN SERPL-MCNC: 2004 NG/ML — HIGH (ref 30–400)
GLUCOSE BLDC GLUCOMTR-MCNC: 112 MG/DL — HIGH (ref 70–99)
GLUCOSE BLDC GLUCOMTR-MCNC: 128 MG/DL — HIGH (ref 70–99)
GLUCOSE BLDC GLUCOMTR-MCNC: 130 MG/DL — HIGH (ref 70–99)
LDH SERPL L TO P-CCNC: 491 U/L — HIGH (ref 120–225)

## 2021-03-21 RX ADMIN — ALBUTEROL 2 PUFF(S): 90 AEROSOL, METERED ORAL at 08:51

## 2021-03-21 RX ADMIN — ENOXAPARIN SODIUM 40 MILLIGRAM(S): 100 INJECTION SUBCUTANEOUS at 11:48

## 2021-03-21 RX ADMIN — Medication 81 MILLIGRAM(S): at 11:48

## 2021-03-21 RX ADMIN — ALBUTEROL 2 PUFF(S): 90 AEROSOL, METERED ORAL at 14:21

## 2021-03-21 RX ADMIN — ALBUTEROL 2 PUFF(S): 90 AEROSOL, METERED ORAL at 04:00

## 2021-03-21 RX ADMIN — CHLORHEXIDINE GLUCONATE 1 APPLICATION(S): 213 SOLUTION TOPICAL at 05:00

## 2021-03-21 RX ADMIN — Medication 6 MILLIGRAM(S): at 05:00

## 2021-03-21 RX ADMIN — ALBUTEROL 2 PUFF(S): 90 AEROSOL, METERED ORAL at 21:07

## 2021-03-21 RX ADMIN — Medication 1000 MILLIGRAM(S): at 11:48

## 2021-03-21 RX ADMIN — PANTOPRAZOLE SODIUM 40 MILLIGRAM(S): 20 TABLET, DELAYED RELEASE ORAL at 08:51

## 2021-03-21 NOTE — PROGRESS NOTE ADULT - ASSESSMENT
ASSESSMENT AND PLAN:  55M, no PMH, PSH appy and moody 1990s presented 3/14 with x9 days worsening cough, subjective fevers, and SOB, with x2-3 days dysuria and central, non-radiating, constant CP. Admitted to [facility] for acute hypoxic respiratory failure s/t covid pneumonia, ICU consulted for increasing work of breathing on 15lpm NRM.     1. Acute hypoxic respiratory failure  2. Covid pneumonia  3. Transaminitis  4. Prediabetes  5. Abnormal TSH    =================== Neuro============================  Alert and oriented x 3     ================= Cardiovascular==========================  Elevated BP:  -Noted to have multiple elevated bp readings, s/o HTN  -continue to monitor, may consider low dose acei/arb    ================- Pulm=================================  Acute hypoxic respiratory failure:  -secondary to covid pneumonia  -cxr on admission with b/l infiltrates,   -d-dimer initially elevated on presentation, now wnl  -remdesivir was discontinued for positive Igs  -continue decadron, add albuterol prn  -WOB on NRM, transfer to ICU for HFNC  -f/u repeat CXR, procalcitonin    ==================ID===================================  Covid pneumonia  -plan as above    ================= Nephro================================  -monitor lytes, SCr, I/Os    =================GI====================================  Transaminitis:  - and  on presentation, improved to 62/171   -suspected to be d/t covid,   -continue to monitor, f/u hepatitis panel    ================ Heme==================================  Elevated d-dimer:  -d-dimer 423 on presentation, now wnl  -suspected s/t covid,   -continue prophylactic lovenox    =================Endocrine===============================  Prediabetes:  -a1c noted 5.8,   -continue HSS  -monitor fs stacey while on steroids    Abnormal TSH:  -TSH level noted 0.26,   -will send repeat TSH and Free T4    ================= Skin/Catheters============================  No rashes. Peripheral IV lines.     - =================Prophylaxis =============================  Lovenox for DVT proph  Protonix for  GI proph    ==================GOC==================================   FULL CODE

## 2021-03-21 NOTE — PROGRESS NOTE ADULT - ATTENDING COMMENTS
IMP: This is a 55 yr old  man , non smoker with  moody 1990s presented 3/14 with x9 days worsening cough, subjective fevers, and SOB, with x2-3 days dysuria and central, non-radiating, constant CP. Admitted to medicine unit  for acute hypoxic respiratory failure secondary to pna from covid-19 infection .     Assessment:  1. Acute hypoxic respiratory failure  2 Covid-19 infection   3. Transaminitis  4. Prediabetes    Plan   -isolation : contact and air borne   -HFNC support needed to maintain O2 sat>90%  -monitor biomarkers daily  -dvt/gi prophy  -continue decadron 6 mg /day x 10 days total then taper  -not a candidate for remdesivir dut to covid-19 antibodies and elevated LFT  -not a candidate for Tociluzimab due to elevated LFT  -hemodynamic monitoring   -self prone as tolerated   -Oral diet  -low threshold for intubation

## 2021-03-21 NOTE — PROGRESS NOTE ADULT - SUBJECTIVE AND OBJECTIVE BOX
Pt is awake, alert, lying in bed on HFNC - in ICU     INTERVAL HPI/OVERNIGHT EVENTS:      VITAL SIGNS:  T(F): 98.1 (03-21-21 @ 12:00)  HR: 77 (03-21-21 @ 12:05)  BP: 115/58 (03-21-21 @ 12:00)  RR: 24 (03-21-21 @ 12:00)  SpO2: 97% (03-21-21 @ 12:05)  Wt(kg): --  I&O's Detail    20 Mar 2021 07:01  -  21 Mar 2021 07:00  --------------------------------------------------------  IN:    Oral Fluid: 1040 mL  Total IN: 1040 mL    OUT:    Voided (mL): 1350 mL  Total OUT: 1350 mL    Total NET: -310 mL      21 Mar 2021 07:01  -  21 Mar 2021 13:26  --------------------------------------------------------  IN:  Total IN: 0 mL    OUT:    Voided (mL): 200 mL  Total OUT: 200 mL    Total NET: -200 mL              REVIEW OF SYSTEMS:    CONSTITUTIONAL:  No fevers, chills, sweats    HEENT:  Eyes:  No diplopia or blurred vision. ENT:  No earache, sore throat or runny nose.    CARDIOVASCULAR:  No pressure, squeezing, tightness, or heaviness about the chest; no palpitations.    RESPIRATORY:  Per HPI    GASTROINTESTINAL:  No abdominal pain, nausea, vomiting or diarrhea.    GENITOURINARY:  No dysuria, frequency or urgency.    NEUROLOGIC:  No paresthesias, fasciculations, seizures or weakness.    PSYCHIATRIC:  No disorder of thought or mood.      PHYSICAL EXAM:    Constitutional: Well developed and nourished  Eyes:Perrla  ENMT: normal  Neck:supple  Respiratory: good air entry  Cardiovascular: S1 S2 regular  Gastrointestinal: Soft, Non tender  Extremities: No edema  Vascular:normal  Neurological:Awake, alert  Musculoskeletal:Normal      MEDICATIONS  (STANDING):  ALBUTerol    90 MICROgram(s) HFA Inhaler 2 Puff(s) Inhalation every 6 hours  ascorbic acid 1000 milliGRAM(s) Oral daily  aspirin enteric coated 81 milliGRAM(s) Oral daily  chlorhexidine 2% Cloths 1 Application(s) Topical <User Schedule>  dexAMETHasone     Tablet 6 milliGRAM(s) Oral daily  enoxaparin Injectable 40 milliGRAM(s) SubCutaneous daily  insulin lispro (ADMELOG) corrective regimen sliding scale   SubCutaneous three times a day before meals  pantoprazole    Tablet 40 milliGRAM(s) Oral before breakfast    MEDICATIONS  (PRN):  acetaminophen   Tablet .. 650 milliGRAM(s) Oral every 6 hours PRN Temp greater or equal to 38C (100.4F), Moderate Pain (4 - 6)      Allergies    No Known Allergies    Intolerances        LABS:                        15.7   11.66 )-----------( 357      ( 20 Mar 2021 04:23 )             47.1     03-20    137  |  106  |  25<H>  ----------------------------<  104<H>  4.4   |  21<L>  |  0.74    Ca    8.9      20 Mar 2021 04:23  Phos  3.5     03-20  Mg     2.3     03-20    TPro  7.0  /  Alb  2.5<L>  /  TBili  1.1  /  DBili  x   /  AST  44<H>  /  ALT  170<H>  /  AlkPhos  82  03-20        ABG - ( 19 Mar 2021 16:06 )  pH, Arterial: 7.47  pH, Blood: x     /  pCO2: 30    /  pO2: 77    / HCO3: 21    / Base Excess: -0.6  /  SaO2: 95                    CAPILLARY BLOOD GLUCOSE      POCT Blood Glucose.: 130 mg/dL (21 Mar 2021 10:25)  POCT Blood Glucose.: 112 mg/dL (21 Mar 2021 05:47)  POCT Blood Glucose.: 119 mg/dL (20 Mar 2021 23:08)  POCT Blood Glucose.: 131 mg/dL (20 Mar 2021 16:21)    pro-bnp -- 03-20 @ 04:23     d-dimer 229  03-20 @ 04:23  pro-bnp -- 03-17 @ 10:55     d-dimer 151  03-17 @ 10:55      RADIOLOGY & ADDITIONAL TESTS:    CXR:    Ct scan chest:    ekg;    echo:

## 2021-03-21 NOTE — PROGRESS NOTE ADULT - ASSESSMENT
Assessment and Recommendation:   Problem/Recommendation - 1:  Problem: COVID-19. Recommendation: isolation precautions  oxygen supp - on NRM was with increased WOB  On HFNC   ICU consulted; patient transferred to ICU for further management.   Monitor oxygen sat  Check LFT, LDH, CRP, D-Dimer, Ferritin and procalcitonin  Vit C, D and zinc supp  Montelukast 10 mgs po Qhs  IV steroids.  Management per ICU   CXR noted 3/19.- repeat pending  Low threshold for intubation   DVT and GI PPX     Problem/Recommendation - 2:  ·  Problem: UTI (urinary tract infection).  Recommendation: check pending culture  Antibiotics.     Problem/Recommendation - 3:  ·  Problem: Chest pain.  Recommendation: likely related to Covid-19 infection.     Problem/Recommendation - 4:  ·  Problem: Transaminitis.  Recommendation: monitor LFTs  GI F/U

## 2021-03-21 NOTE — PROGRESS NOTE ADULT - SUBJECTIVE AND OBJECTIVE BOX
Patient is a 55y old  Male who presents with a chief complaint of SOB (21 Mar 2021 02:32)    pt seen in icu [ x ], reg med floor [  ], bed [ x ], chair at bedside [   ], a+o x3 [ x ], lethargic [  ],    nad [ x ]      Allergies    No Known Allergies        Vitals    T(F): 96.8 (03-21-21 @ 04:00), Max: 98.3 (03-20-21 @ 17:35)  HR: 61 (03-21-21 @ 06:00) (56 - 87)  BP: 119/64 (03-21-21 @ 06:00) (108/70 - 148/63)  RR: 25 (03-21-21 @ 06:00) (20 - 39)  SpO2: 95% (03-21-21 @ 06:00) (75% - 98%)  Wt(kg): --  CAPILLARY BLOOD GLUCOSE      POCT Blood Glucose.: 112 mg/dL (21 Mar 2021 05:47)      Labs                          15.7   11.66 )-----------( 357      ( 20 Mar 2021 04:23 )             47.1       03-20    137  |  106  |  25<H>  ----------------------------<  104<H>  4.4   |  21<L>  |  0.74    Ca    8.9      20 Mar 2021 04:23  Phos  3.5     03-20  Mg     2.3     03-20    TPro  7.0  /  Alb  2.5<L>  /  TBili  1.1  /  DBili  x   /  AST  44<H>  /  ALT  170<H>  /  AlkPhos  82  03-20            .Urine Clean Catch (Midstream)  03-15 @ 00:52   No growth  --  --          Radiology Results      Meds    MEDICATIONS  (STANDING):  ALBUTerol    90 MICROgram(s) HFA Inhaler 2 Puff(s) Inhalation every 6 hours  ascorbic acid 1000 milliGRAM(s) Oral daily  aspirin enteric coated 81 milliGRAM(s) Oral daily  chlorhexidine 2% Cloths 1 Application(s) Topical <User Schedule>  dexAMETHasone     Tablet 6 milliGRAM(s) Oral daily  enoxaparin Injectable 40 milliGRAM(s) SubCutaneous daily  insulin lispro (ADMELOG) corrective regimen sliding scale   SubCutaneous three times a day before meals  pantoprazole    Tablet 40 milliGRAM(s) Oral before breakfast      MEDICATIONS  (PRN):  acetaminophen   Tablet .. 650 milliGRAM(s) Oral every 6 hours PRN Temp greater or equal to 38C (100.4F), Moderate Pain (4 - 6)      Physical Exam    Neuro :  no focal deficits  Respiratory: CTA B/L  CV: RRR, S1S2, no murmurs,   Abdominal: Soft, NT, ND +BS,  Extremities: No edema, + peripheral pulses    ASSESSMENT    Hypoxemia 2nd to covid pna   transaminitis  prediabetes  h/o appendectomy  cholecystectomy        PLAN    contact and airborne isolation  d/c remdesevir given covid ab positive noted   cont dexamethasone  cont asa, vit c,    cont albuterol inhaler   pulm f/u  procalcitonin, D-dimer, crp, ldh, ferritin, lactate noted ,    cont tylenol prn,   cont robitussin prn   O2 sat (84% - 96%) hi miguel ángel  O2 via hi miguel ángel  pt low threshould for intubation  pt will likely benefit from high miguel ángel O2  icu cons noted  f/u acute hepatitis panel  lispro ss  cont current meds         Patient is a 55y old  Male who presents with a chief complaint of SOB (21 Mar 2021 02:32)    pt seen in icu [ x ], reg med floor [  ], bed [ x ], chair at bedside [   ], a+o x3 [ x ], lethargic [  ],    nad [ x ]      Allergies    No Known Allergies        Vitals    T(F): 96.8 (03-21-21 @ 04:00), Max: 98.3 (03-20-21 @ 17:35)  HR: 61 (03-21-21 @ 06:00) (56 - 87)  BP: 119/64 (03-21-21 @ 06:00) (108/70 - 148/63)  RR: 25 (03-21-21 @ 06:00) (20 - 39)  SpO2: 95% (03-21-21 @ 06:00) (75% - 98%)  Wt(kg): --  CAPILLARY BLOOD GLUCOSE      POCT Blood Glucose.: 112 mg/dL (21 Mar 2021 05:47)      Labs                          15.7   11.66 )-----------( 357      ( 20 Mar 2021 04:23 )             47.1       03-20    137  |  106  |  25<H>  ----------------------------<  104<H>  4.4   |  21<L>  |  0.74    Ca    8.9      20 Mar 2021 04:23  Phos  3.5     03-20  Mg     2.3     03-20    TPro  7.0  /  Alb  2.5<L>  /  TBili  1.1  /  DBili  x   /  AST  44<H>  /  ALT  170<H>  /  AlkPhos  82  03-20            .Urine Clean Catch (Midstream)  03-15 @ 00:52   No growth  --  --          Radiology Results      Meds    MEDICATIONS  (STANDING):  ALBUTerol    90 MICROgram(s) HFA Inhaler 2 Puff(s) Inhalation every 6 hours  ascorbic acid 1000 milliGRAM(s) Oral daily  aspirin enteric coated 81 milliGRAM(s) Oral daily  chlorhexidine 2% Cloths 1 Application(s) Topical <User Schedule>  dexAMETHasone     Tablet 6 milliGRAM(s) Oral daily  enoxaparin Injectable 40 milliGRAM(s) SubCutaneous daily  insulin lispro (ADMELOG) corrective regimen sliding scale   SubCutaneous three times a day before meals  pantoprazole    Tablet 40 milliGRAM(s) Oral before breakfast      MEDICATIONS  (PRN):  acetaminophen   Tablet .. 650 milliGRAM(s) Oral every 6 hours PRN Temp greater or equal to 38C (100.4F), Moderate Pain (4 - 6)      Physical Exam    Neuro :  no focal deficits  Respiratory: CTA B/L  CV: RRR, S1S2, no murmurs,   Abdominal: Soft, NT, ND +BS,  Extremities: No edema, + peripheral pulses    ASSESSMENT    Hypoxemia 2nd to covid pna   transaminitis  prediabetes  h/o appendectomy  cholecystectomy        PLAN    contact and airborne isolation  d/c remdesevir given covid ab positive noted   cont dexamethasone  cont asa, vit c,    cont albuterol inhaler   pulm f/u  procalcitonin, D-dimer, crp, ldh, ferritin, lactate noted ,    cont tylenol prn,   cont robitussin prn   O2 sat (75% - 98%) hi miguel ángel  O2 via hi miguel ángel  pt low threshold for intubation   xray 3/19/21 with pneumomediastinum  rept cxr  icu cons noted  f/u acute hepatitis panel  lispro ss  cont current meds

## 2021-03-21 NOTE — PROGRESS NOTE ADULT - SUBJECTIVE AND OBJECTIVE BOX
INTERVAL HPI/OVERNIGHT EVENTS:      Pulmonary:  ALBUTerol    90 MICROgram(s) HFA Inhaler 2 Puff(s) Inhalation every 6 hours    Hematalogic:  aspirin enteric coated 81 milliGRAM(s) Oral daily  enoxaparin Injectable 40 milliGRAM(s) SubCutaneous daily    Other:  acetaminophen   Tablet .. 650 milliGRAM(s) Oral every 6 hours PRN  ascorbic acid 1000 milliGRAM(s) Oral daily  chlorhexidine 2% Cloths 1 Application(s) Topical <User Schedule>  dexAMETHasone     Tablet 6 milliGRAM(s) Oral daily  insulin lispro (ADMELOG) corrective regimen sliding scale   SubCutaneous three times a day before meals  pantoprazole    Tablet 40 milliGRAM(s) Oral before breakfast    acetaminophen   Tablet .. 650 milliGRAM(s) Oral every 6 hours PRN  ALBUTerol    90 MICROgram(s) HFA Inhaler 2 Puff(s) Inhalation every 6 hours  ascorbic acid 1000 milliGRAM(s) Oral daily  aspirin enteric coated 81 milliGRAM(s) Oral daily  chlorhexidine 2% Cloths 1 Application(s) Topical <User Schedule>  dexAMETHasone     Tablet 6 milliGRAM(s) Oral daily  enoxaparin Injectable 40 milliGRAM(s) SubCutaneous daily  insulin lispro (ADMELOG) corrective regimen sliding scale   SubCutaneous three times a day before meals  pantoprazole    Tablet 40 milliGRAM(s) Oral before breakfast    Drug Dosing Weight  Height (cm): 167.6 (14 Mar 2021 12:03)  Weight (kg): 83.869 (14 Mar 2021 12:03)  BMI (kg/m2): 29.9 (14 Mar 2021 12:03)  BSA (m2): 1.93 (14 Mar 2021 12:03)    CENTRAL LINE: [ ] YES [ ] NO  LOCATION:         RIVERA: [ ] YES [ ] NO          A-LINE:  [ ] YES [ ] NO  LOCATION:       ICU Vital Signs Last 24 Hrs  T(C): 35.9 (21 Mar 2021 00:00), Max: 36.8 (20 Mar 2021 17:35)  T(F): 96.6 (21 Mar 2021 00:00), Max: 98.3 (20 Mar 2021 17:35)  HR: 63 (21 Mar 2021 00:30) (56 - 87)  BP: 128/72 (21 Mar 2021 00:00) (108/70 - 148/63)  BP(mean): 85 (21 Mar 2021 00:00) (67 - 88)  ABP: --  ABP(mean): --  RR: 24 (21 Mar 2021 00:00) (21 - 39)  SpO2: 95% (21 Mar 2021 00:30) (75% - 98%)      ABG - ( 19 Mar 2021 16:06 )  pH, Arterial: 7.47  pH, Blood: x     /  pCO2: 30    /  pO2: 77    / HCO3: 21    / Base Excess: -0.6  /  SaO2: 95          03-19 @ 07:01  -  03-20 @ 07:00  --------------------------------------------------------  IN: 0 mL / OUT: 225 mL / NET: -225 mL    REVIEW OF SYSTEMS:    CONSTITUTIONAL: No weakness, fevers or chills  NECK: No pain or stiffness  RESPIRATORY: No cough, wheezing, hemoptysis; No shortness of breath  CARDIOVASCULAR: No chest pain or palpitations  GASTROINTESTINAL: No abdominal or epigastric pain. No nausea, vomiting, No diarrhea or constipation. No melena or hematochezia.  GENITOURINARY: No dysuria, frequency or hematuria  NEUROLOGICAL: No numbness or weakness  All other review of systems is negative unless indicated above      PHYSICAL EXAM:    GENERAL: NAD, well-groomed, well-developed  EYES: EOMI, PERRLA,   NECK: Supple, No JVD; Normal thyroid; Trachea midline  NERVOUS SYSTEM:  Alert & Oriented X3,  Motor Strength 5/5 B/L upper and lower extremities; DTRs 2+ intact and symmetric  CHEST/LUNG: No rales, rhonchi, wheezing   HEART: Regular rate and rhythm; No murmurs,   ABDOMEN: Soft, Nontender, Nondistended; Bowel sounds present  EXTREMITIES:  2+ Peripheral Pulses, No clubbing, cyanosis, or edema      LABS:  CBC Full  -  ( 20 Mar 2021 04:23 )  WBC Count : 11.66 K/uL  RBC Count : 5.10 M/uL  Hemoglobin : 15.7 g/dL  Hematocrit : 47.1 %  Platelet Count - Automated : 357 K/uL  Mean Cell Volume : 92.4 fl  Mean Cell Hemoglobin : 30.8 pg  Mean Cell Hemoglobin Concentration : 33.3 gm/dL  Auto Neutrophil # : 8.95 K/uL  Auto Lymphocyte # : 1.22 K/uL  Auto Monocyte # : 0.68 K/uL  Auto Eosinophil # : 0.01 K/uL  Auto Basophil # : 0.05 K/uL  Auto Neutrophil % : 76.8 %  Auto Lymphocyte % : 10.5 %  Auto Monocyte % : 5.8 %  Auto Eosinophil % : 0.1 %  Auto Basophil % : 0.4 %    03-20    137  |  106  |  25<H>  ----------------------------<  104<H>  4.4   |  21<L>  |  0.74    Ca    8.9      20 Mar 2021 04:23  Phos  3.5     03-20  Mg     2.3     03-20    TPro  7.0  /  Alb  2.5<L>  /  TBili  1.1  /  DBili  x   /  AST  44<H>  /  ALT  170<H>  /  AlkPhos  82  03-20      RADIOLOGY & ADDITIONAL STUDIES REVIEWED:  ***    [ ]GOALS OF CARE DISCUSSION WITH PATIENT/FAMILY/PROXY:    CRITICAL CARE TIME SPENT: 35 minutes INTERVAL HPI/OVERNIGHT EVENTS: Pt on NRB and HFnc.       Pulmonary:  ALBUTerol    90 MICROgram(s) HFA Inhaler 2 Puff(s) Inhalation every 6 hours    Hematalogic:  aspirin enteric coated 81 milliGRAM(s) Oral daily  enoxaparin Injectable 40 milliGRAM(s) SubCutaneous daily    Other:  acetaminophen   Tablet .. 650 milliGRAM(s) Oral every 6 hours PRN  ascorbic acid 1000 milliGRAM(s) Oral daily  chlorhexidine 2% Cloths 1 Application(s) Topical <User Schedule>  dexAMETHasone     Tablet 6 milliGRAM(s) Oral daily  insulin lispro (ADMELOG) corrective regimen sliding scale   SubCutaneous three times a day before meals  pantoprazole    Tablet 40 milliGRAM(s) Oral before breakfast    acetaminophen   Tablet .. 650 milliGRAM(s) Oral every 6 hours PRN  ALBUTerol    90 MICROgram(s) HFA Inhaler 2 Puff(s) Inhalation every 6 hours  ascorbic acid 1000 milliGRAM(s) Oral daily  aspirin enteric coated 81 milliGRAM(s) Oral daily  chlorhexidine 2% Cloths 1 Application(s) Topical <User Schedule>  dexAMETHasone     Tablet 6 milliGRAM(s) Oral daily  enoxaparin Injectable 40 milliGRAM(s) SubCutaneous daily  insulin lispro (ADMELOG) corrective regimen sliding scale   SubCutaneous three times a day before meals  pantoprazole    Tablet 40 milliGRAM(s) Oral before breakfast    Drug Dosing Weight  Height (cm): 167.6 (14 Mar 2021 12:03)  Weight (kg): 83.869 (14 Mar 2021 12:03)  BMI (kg/m2): 29.9 (14 Mar 2021 12:03)  BSA (m2): 1.93 (14 Mar 2021 12:03)    CENTRAL LINE: [ ] YES [X ] NO  LOCATION:         RIVERA: [ ] YES [ ] NO          A-LINE:  [ ] YES [X ] NO  LOCATION:       ICU Vital Signs Last 24 Hrs  T(C): 35.9 (21 Mar 2021 00:00), Max: 36.8 (20 Mar 2021 17:35)  T(F): 96.6 (21 Mar 2021 00:00), Max: 98.3 (20 Mar 2021 17:35)  HR: 63 (21 Mar 2021 00:30) (56 - 87)  BP: 128/72 (21 Mar 2021 00:00) (108/70 - 148/63)  BP(mean): 85 (21 Mar 2021 00:00) (67 - 88)  ABP: --  ABP(mean): --  RR: 24 (21 Mar 2021 00:00) (21 - 39)  SpO2: 95% (21 Mar 2021 00:30) (75% - 98%)      ABG - ( 19 Mar 2021 16:06 )  pH, Arterial: 7.47  pH, Blood: x     /  pCO2: 30    /  pO2: 77    / HCO3: 21    / Base Excess: -0.6  /  SaO2: 95          03-19 @ 07:01  -  03-20 @ 07:00  --------------------------------------------------------  IN: 0 mL / OUT: 225 mL / NET: -225 mL    REVIEW OF SYSTEMS:    CONSTITUTIONAL: No weakness, fevers or chills  NECK: No pain or stiffness  RESPIRATORY: No cough, wheezing, hemoptysis; No shortness of breath  CARDIOVASCULAR: No chest pain or palpitations  GASTROINTESTINAL: No abdominal or epigastric pain. No nausea, vomiting, No diarrhea or constipation. No melena or hematochezia.  GENITOURINARY: No dysuria, frequency or hematuria  NEUROLOGICAL: No numbness or weakness  All other review of systems is negative unless indicated above      PHYSICAL EXAM:    GENERAL: NAD, well-groomed, well-developed  EYES: EOMI, PERRLA,   NECK: Supple, No JVD; Normal thyroid; Trachea midline  NERVOUS SYSTEM:  Alert & Oriented X3,  Motor Strength 5/5 B/L upper and lower extremities; DTRs 2+ intact and symmetric  CHEST/LUNG: No rales, rhonchi, wheezing   HEART: Regular rate and rhythm; No murmurs,   ABDOMEN: Soft, Nontender, Nondistended; Bowel sounds present  EXTREMITIES:  2+ Peripheral Pulses, No clubbing, cyanosis, or edema      LABS:  CBC Full  -  ( 20 Mar 2021 04:23 )  WBC Count : 11.66 K/uL  RBC Count : 5.10 M/uL  Hemoglobin : 15.7 g/dL  Hematocrit : 47.1 %  Platelet Count - Automated : 357 K/uL  Mean Cell Volume : 92.4 fl  Mean Cell Hemoglobin : 30.8 pg  Mean Cell Hemoglobin Concentration : 33.3 gm/dL  Auto Neutrophil # : 8.95 K/uL  Auto Lymphocyte # : 1.22 K/uL  Auto Monocyte # : 0.68 K/uL  Auto Eosinophil # : 0.01 K/uL  Auto Basophil # : 0.05 K/uL  Auto Neutrophil % : 76.8 %  Auto Lymphocyte % : 10.5 %  Auto Monocyte % : 5.8 %  Auto Eosinophil % : 0.1 %  Auto Basophil % : 0.4 %    03-20    137  |  106  |  25<H>  ----------------------------<  104<H>  4.4   |  21<L>  |  0.74    Ca    8.9      20 Mar 2021 04:23  Phos  3.5     03-20  Mg     2.3     03-20    TPro  7.0  /  Alb  2.5<L>  /  TBili  1.1  /  DBili  x   /  AST  44<H>  /  ALT  170<H>  /  AlkPhos  82  03-20      RADIOLOGY & ADDITIONAL STUDIES REVIEWED:     [ ]GOALS OF CARE DISCUSSION WITH PATIENT/FAMILY/PROXY:    CRITICAL CARE TIME SPENT: 35 minutes

## 2021-03-22 LAB
ALBUMIN SERPL ELPH-MCNC: 2.5 G/DL — LOW (ref 3.5–5)
ALP SERPL-CCNC: 81 U/L — SIGNIFICANT CHANGE UP (ref 40–120)
ALT FLD-CCNC: 152 U/L DA — HIGH (ref 10–60)
ANION GAP SERPL CALC-SCNC: 9 MMOL/L — SIGNIFICANT CHANGE UP (ref 5–17)
AST SERPL-CCNC: 35 U/L — SIGNIFICANT CHANGE UP (ref 10–40)
BILIRUB SERPL-MCNC: 1.2 MG/DL — SIGNIFICANT CHANGE UP (ref 0.2–1.2)
BUN SERPL-MCNC: 29 MG/DL — HIGH (ref 7–18)
CALCIUM SERPL-MCNC: 9 MG/DL — SIGNIFICANT CHANGE UP (ref 8.4–10.5)
CHLORIDE SERPL-SCNC: 105 MMOL/L — SIGNIFICANT CHANGE UP (ref 96–108)
CO2 SERPL-SCNC: 24 MMOL/L — SIGNIFICANT CHANGE UP (ref 22–31)
CREAT SERPL-MCNC: 0.82 MG/DL — SIGNIFICANT CHANGE UP (ref 0.5–1.3)
CRP SERPL-MCNC: 45 MG/L — HIGH
FERRITIN SERPL-MCNC: 2101 NG/ML — HIGH (ref 30–400)
GLUCOSE BLDC GLUCOMTR-MCNC: 123 MG/DL — HIGH (ref 70–99)
GLUCOSE BLDC GLUCOMTR-MCNC: 158 MG/DL — HIGH (ref 70–99)
GLUCOSE BLDC GLUCOMTR-MCNC: 99 MG/DL — SIGNIFICANT CHANGE UP (ref 70–99)
GLUCOSE SERPL-MCNC: 91 MG/DL — SIGNIFICANT CHANGE UP (ref 70–99)
HCT VFR BLD CALC: 46.9 % — SIGNIFICANT CHANGE UP (ref 39–50)
HGB BLD-MCNC: 16 G/DL — SIGNIFICANT CHANGE UP (ref 13–17)
MCHC RBC-ENTMCNC: 31.7 PG — SIGNIFICANT CHANGE UP (ref 27–34)
MCHC RBC-ENTMCNC: 34.1 GM/DL — SIGNIFICANT CHANGE UP (ref 32–36)
MCV RBC AUTO: 93.1 FL — SIGNIFICANT CHANGE UP (ref 80–100)
NRBC # BLD: 0 /100 WBCS — SIGNIFICANT CHANGE UP (ref 0–0)
PLATELET # BLD AUTO: 437 K/UL — HIGH (ref 150–400)
POTASSIUM SERPL-MCNC: 4.5 MMOL/L — SIGNIFICANT CHANGE UP (ref 3.5–5.3)
POTASSIUM SERPL-SCNC: 4.5 MMOL/L — SIGNIFICANT CHANGE UP (ref 3.5–5.3)
PROT SERPL-MCNC: 7 G/DL — SIGNIFICANT CHANGE UP (ref 6–8.3)
RBC # BLD: 5.04 M/UL — SIGNIFICANT CHANGE UP (ref 4.2–5.8)
RBC # FLD: 13.2 % — SIGNIFICANT CHANGE UP (ref 10.3–14.5)
SODIUM SERPL-SCNC: 138 MMOL/L — SIGNIFICANT CHANGE UP (ref 135–145)
WBC # BLD: 13.05 K/UL — HIGH (ref 3.8–10.5)
WBC # FLD AUTO: 13.05 K/UL — HIGH (ref 3.8–10.5)

## 2021-03-22 PROCEDURE — 71045 X-RAY EXAM CHEST 1 VIEW: CPT | Mod: 26

## 2021-03-22 RX ADMIN — Medication 81 MILLIGRAM(S): at 12:21

## 2021-03-22 RX ADMIN — PANTOPRAZOLE SODIUM 40 MILLIGRAM(S): 20 TABLET, DELAYED RELEASE ORAL at 06:02

## 2021-03-22 RX ADMIN — Medication 6 MILLIGRAM(S): at 05:03

## 2021-03-22 RX ADMIN — ALBUTEROL 2 PUFF(S): 90 AEROSOL, METERED ORAL at 02:03

## 2021-03-22 RX ADMIN — ALBUTEROL 2 PUFF(S): 90 AEROSOL, METERED ORAL at 20:01

## 2021-03-22 RX ADMIN — Medication 1000 MILLIGRAM(S): at 12:21

## 2021-03-22 RX ADMIN — Medication 1: at 12:20

## 2021-03-22 RX ADMIN — ENOXAPARIN SODIUM 40 MILLIGRAM(S): 100 INJECTION SUBCUTANEOUS at 12:21

## 2021-03-22 RX ADMIN — ALBUTEROL 2 PUFF(S): 90 AEROSOL, METERED ORAL at 09:53

## 2021-03-22 RX ADMIN — ALBUTEROL 2 PUFF(S): 90 AEROSOL, METERED ORAL at 17:16

## 2021-03-22 RX ADMIN — CHLORHEXIDINE GLUCONATE 1 APPLICATION(S): 213 SOLUTION TOPICAL at 05:03

## 2021-03-22 NOTE — PROGRESS NOTE ADULT - ASSESSMENT
ASSESSMENT AND PLAN:  55M, no PMH, PSH appy and moody 1990s presented 3/14 with x9 days worsening cough, subjective fevers, and SOB, with x2-3 days dysuria and central, non-radiating, constant CP. Admitted to [facility] for acute hypoxic respiratory failure s/t covid pneumonia, ICU consulted for increasing work of breathing on 15lpm NRM.     1. Acute hypoxic respiratory failure  2. Covid pneumonia  3. Transaminitis  4. Prediabetes  5. Abnormal TSH    =================== Neuro============================  Alert and oriented x 3     ================= Cardiovascular==========================  Elevated BP:  -Noted to have multiple elevated bp readings, s/o HTN  -continue to monitor, may consider low dose acei/arb    ================- Pulm=================================  Acute hypoxic respiratory failure:  -secondary to covid pneumonia  -cxr on admission with b/l infiltrates,   -d-dimer initially elevated on presentation, now wnl  -remdesivir was discontinued for positive Igs  -continue decadron, add albuterol prn  -WOB on NRM, transfer to ICU for HFNC  -f/u repeat CXR, procalcitonin    ==================ID===================================  Covid pneumonia  -plan as above    ================= Nephro================================  -monitor lytes, SCr, I/Os    =================GI====================================  Transaminitis:  - and  on presentation, improved to 62/171   -suspected to be d/t covid,   -continue to monitor, f/u hepatitis panel    ================ Heme==================================  Elevated d-dimer:  -d-dimer 423 on presentation, now wnl  -suspected s/t covid,   -continue prophylactic lovenox    =================Endocrine===============================  Prediabetes:  -a1c noted 5.8,   -continue HSS  -monitor fs stacey while on steroids    Abnormal TSH:  -TSH level noted 0.26,   -will send repeat TSH and Free T4    ================= Skin/Catheters============================  No rashes. Peripheral IV lines.     - =================Prophylaxis =============================  Lovenox for DVT proph  Protonix for  GI proph    ==================GOC==================================   FULL CODE ASSESSMENT AND PLAN:  55M, no PMH, PSH appy and moody 1990s presented 3/14 with x9 days worsening cough, subjective fevers, and SOB, with x2-3 days dysuria and central, non-radiating, constant CP. Admitted to [facility] for acute hypoxic respiratory failure s/t covid pneumonia, ICU consulted for increasing work of breathing on 15lpm NRM.     1. Acute hypoxic respiratory failure  2. Covid pneumonia  3. Transaminitis  4. Prediabetes  5. Abnormal TSH    =================== Neuro============================  Alert and oriented x 3     ================= Cardiovascular==========================  Hypertension    - Initially, noted to have multiple elevated BP readings  -Normotensive at this time   -Continue to monitor, may consider low dose acei/arb if hypertensive     ================- Pulm=================================  Acute hypoxic respiratory failure: secondary to covid pneumonia    -On HFNC + NRB 15L  -CXR on admission with b/l infiltrates,   -D-dimer initially elevated on presentation, now wnl  -Remdesivir was discontinued due to positive antibodies   -Continue decadron, add albuterol prn  -f/u repeat CXR, procalcitonin  -Encourage use of incentive spirometry  -OOB/PT    ==================ID===================================  Covid pneumonia    -mild leukocytosis  -remains afebrile  -plan as above     ================= Nephro================================  -No issues   -voiding without issue  -monitor lytes, SCr   -monitor I/Os    =================GI====================================  Transaminitis: likely secondary to covid     - and  on presentation  -Improving- AST 35/  -continue to monitor, f/u hepatitis panel    ================ Heme==================================  Elevated d-dimer: likely secondary to covid    -d-dimer 423 on presentation, now wnl  -continue prophylactic Lovenox 40 mg QD    =================Endocrine===============================  Prediabetes:    -a1c  5.8  -BS controlled  -continue HSS  -monitor FS while on steroids    Abnormal TSH:  -TSH level noted 0.26,   -will send repeat TSH and Free T4      ================= Skin/Catheters============================  No rashes. Peripheral IV lines.     - =================Prophylaxis =============================  Lovenox for DVT proph  Protonix for  GI proph    ==================GOC==================================   FULL CODE ASSESSMENT AND PLAN:  55M, no PMH, PSH appy and moody 1990s presented 3/14 with x9 days worsening cough, subjective fevers, and SOB, with x2-3 days dysuria and central, non-radiating, constant CP. Admitted to [facility] for acute hypoxic respiratory failure s/t covid pneumonia, ICU consulted for increasing work of breathing on 15lpm NRM.     1. Acute hypoxic respiratory failure  2. Covid pneumonia  3. Transaminitis  4. Prediabetes  5. Abnormal TSH    =================== Neuro============================  Alert and oriented x 3     ================= Cardiovascular==========================  Hypertension  resolved  - Initially, noted to have multiple elevated BP readings  -Normotensive at this time   -Continue to monitor, may consider low dose acei/arb if hypertensive     ================- Pulm=================================  Acute hypoxic respiratory failure: secondary to covid pneumonia    -On HFNC + NRB 15L  -CXR on admission with b/l infiltrates,   -D-dimer initially elevated on presentation, now wnl  -Remdesivir was discontinued due to positive antibodies   -Continue decadron, add albuterol prn   -procalcitonin wnl  -Encourage use of incentive spirometry  -OOB/PT    ==================ID===================================  Covid pneumonia    -mild leukocytosis  -remains afebrile  -plan as above     ================= Nephro================================  -No issues   -voiding without issue  -monitor lytes, SCr   -monitor I/Os    =================GI====================================  Transaminitis: likely secondary to covid     - and  on presentation  -Improving- AST 35/  -continue to monitor, f/u hepatitis panel    ================ Heme==================================  Elevated d-dimer: likely secondary to covid    -d-dimer 423 on presentation, now wnl  -continue prophylactic Lovenox 40 mg QD    =================Endocrine===============================  Prediabetes:    -a1c  5.8  -BS controlled  -continue HSS  -monitor FS while on steroids    Abnormal TSH:  -TSH level noted 0.26,   -TSH 0.37 and   -f/u Free T4      ================= Skin/Catheters============================  No rashes. Peripheral IV lines.     - =================Prophylaxis =============================  Lovenox for DVT proph  Protonix for  GI proph    ==================GOC==================================   FULL CODE

## 2021-03-22 NOTE — PROGRESS NOTE ADULT - SUBJECTIVE AND OBJECTIVE BOX
INTERVAL HPI/OVERNIGHT EVENTS: ***    PRESSORS: [ ] YES [ ] NO  WHICH:    ANTIBIOTICS:                      Antimicrobial:    Cardiovascular:    Pulmonary:  ALBUTerol    90 MICROgram(s) HFA Inhaler 2 Puff(s) Inhalation every 6 hours    Hematalogic:  aspirin enteric coated 81 milliGRAM(s) Oral daily  enoxaparin Injectable 40 milliGRAM(s) SubCutaneous daily    Other:  acetaminophen   Tablet .. 650 milliGRAM(s) Oral every 6 hours PRN  ascorbic acid 1000 milliGRAM(s) Oral daily  chlorhexidine 2% Cloths 1 Application(s) Topical <User Schedule>  dexAMETHasone     Tablet 6 milliGRAM(s) Oral daily  insulin lispro (ADMELOG) corrective regimen sliding scale   SubCutaneous three times a day before meals  pantoprazole    Tablet 40 milliGRAM(s) Oral before breakfast    acetaminophen   Tablet .. 650 milliGRAM(s) Oral every 6 hours PRN  ALBUTerol    90 MICROgram(s) HFA Inhaler 2 Puff(s) Inhalation every 6 hours  ascorbic acid 1000 milliGRAM(s) Oral daily  aspirin enteric coated 81 milliGRAM(s) Oral daily  chlorhexidine 2% Cloths 1 Application(s) Topical <User Schedule>  dexAMETHasone     Tablet 6 milliGRAM(s) Oral daily  enoxaparin Injectable 40 milliGRAM(s) SubCutaneous daily  insulin lispro (ADMELOG) corrective regimen sliding scale   SubCutaneous three times a day before meals  pantoprazole    Tablet 40 milliGRAM(s) Oral before breakfast    Drug Dosing Weight  Height (cm): 167.6 (14 Mar 2021 12:03)  Weight (kg): 83.869 (14 Mar 2021 12:03)  BMI (kg/m2): 29.9 (14 Mar 2021 12:03)  BSA (m2): 1.93 (14 Mar 2021 12:03)    CENTRAL LINE: [ ] YES [ ] NO  LOCATION:   DATE INSERTED:  REMOVE: [ ] YES [ ] NO  EXPLAIN:    RIVERA: [ ] YES [ ] NO    DATE INSERTED:  REMOVE:  [ ] YES [ ] NO  EXPLAIN:    A-LINE:  [ ] YES [ ] NO  LOCATION:   DATE INSERTED:  REMOVE:  [ ] YES [ ] NO  EXPLAIN:    PMH -reviewed admission note, no change since admission    ICU Vital Signs Last 24 Hrs  T(C): 36.4 (22 Mar 2021 06:00), Max: 36.8 (21 Mar 2021 16:12)  T(F): 97.6 (22 Mar 2021 06:00), Max: 98.3 (21 Mar 2021 16:12)  HR: 72 (22 Mar 2021 06:00) (54 - 99)  BP: 132/69 (22 Mar 2021 06:00) (101/54 - 132/69)  BP(mean): 83 (22 Mar 2021 06:00) (63 - 84)  ABP: --  ABP(mean): --  RR: 31 (22 Mar 2021 06:00) (17 - 39)  SpO2: 95% (22 Mar 2021 06:00) (86% - 98%)            03-20 @ 07:01  -  03-21 @ 07:00  --------------------------------------------------------  IN: 1040 mL / OUT: 1350 mL / NET: -310 mL            PHYSICAL EXAM:    GENERAL: NAD, well-groomed, well-developed  HEAD:  Atraumatic, Normocephalic  EYES: EOMI, PERRLA, conjunctiva and sclera clear  ENMT: No tonsillar erythema, exudates, or enlargement; Moist mucous membranes, Good dentition, No lesions  NECK: Supple, normal appearance, No JVD; Normal thyroid; Trachea midline  NERVOUS SYSTEM:  Alert & Oriented X3, Good concentration; Motor Strength 5/5 B/L upper and lower extremities; DTRs 2+ intact and symmetric  CHEST/LUNG: No chest deformity; Normal percussion bilaterally; No rales, rhonchi, wheezing   HEART: Regular rate and rhythm; No murmurs, rubs, or gallops  ABDOMEN: Soft, Nontender, Nondistended; Bowel sounds present  EXTREMITIES:  2+ Peripheral Pulses, No clubbing, cyanosis, or edema  LYMPH: No lymphadenopathy noted  SKIN: No rashes or lesions; Good capillary refill      LABS:  CBC Full  -  ( 22 Mar 2021 06:02 )  WBC Count : 13.05 K/uL  RBC Count : 5.04 M/uL  Hemoglobin : 16.0 g/dL  Hematocrit : 46.9 %  Platelet Count - Automated : 437 K/uL  Mean Cell Volume : 93.1 fl  Mean Cell Hemoglobin : 31.7 pg  Mean Cell Hemoglobin Concentration : 34.1 gm/dL  Auto Neutrophil # : x  Auto Lymphocyte # : x  Auto Monocyte # : x  Auto Eosinophil # : x  Auto Basophil # : x  Auto Neutrophil % : x  Auto Lymphocyte % : x  Auto Monocyte % : x  Auto Eosinophil % : x  Auto Basophil % : x    03-22    138  |  105  |  29<H>  ----------------------------<  91  4.5   |  24  |  0.82    Ca    9.0      22 Mar 2021 06:02    TPro  7.0  /  Alb  2.5<L>  /  TBili  1.2  /  DBili  x   /  AST  35  /  ALT  152<H>  /  AlkPhos  81  03-22            RADIOLOGY & ADDITIONAL STUDIES REVIEWED:  ***    GOALS OF CARE DISCUSSION WITH PATIENT/FAMILY/PROXY:    CRITICAL CARE TIME SPENT: 35 minutes INTERVAL HPI/OVERNIGHT EVENTS: Transient periods of desaturation overnight, high 80s on HFNC and NRB    Pt seen and examined at bedside this AM, appears in no acute distress on HFNC and NRB 15L-O2 96%. Pt reports feeling better today, denies shortness of breath or chest pain.     PRESSORS: [ ] YES [X ] NO  WHICH:    ANTIBIOTICS:                      Antimicrobial:    Cardiovascular:    Pulmonary:  ALBUTerol    90 MICROgram(s) HFA Inhaler 2 Puff(s) Inhalation every 6 hours    Hematalogic:  aspirin enteric coated 81 milliGRAM(s) Oral daily  enoxaparin Injectable 40 milliGRAM(s) SubCutaneous daily    Other:  acetaminophen   Tablet .. 650 milliGRAM(s) Oral every 6 hours PRN  ascorbic acid 1000 milliGRAM(s) Oral daily  chlorhexidine 2% Cloths 1 Application(s) Topical <User Schedule>  dexAMETHasone     Tablet 6 milliGRAM(s) Oral daily  insulin lispro (ADMELOG) corrective regimen sliding scale   SubCutaneous three times a day before meals  pantoprazole    Tablet 40 milliGRAM(s) Oral before breakfast    acetaminophen   Tablet .. 650 milliGRAM(s) Oral every 6 hours PRN  ALBUTerol    90 MICROgram(s) HFA Inhaler 2 Puff(s) Inhalation every 6 hours  ascorbic acid 1000 milliGRAM(s) Oral daily  aspirin enteric coated 81 milliGRAM(s) Oral daily  chlorhexidine 2% Cloths 1 Application(s) Topical <User Schedule>  dexAMETHasone     Tablet 6 milliGRAM(s) Oral daily  enoxaparin Injectable 40 milliGRAM(s) SubCutaneous daily  insulin lispro (ADMELOG) corrective regimen sliding scale   SubCutaneous three times a day before meals  pantoprazole    Tablet 40 milliGRAM(s) Oral before breakfast    Drug Dosing Weight  Height (cm): 167.6 (14 Mar 2021 12:03)  Weight (kg): 83.869 (14 Mar 2021 12:03)  BMI (kg/m2): 29.9 (14 Mar 2021 12:03)  BSA (m2): 1.93 (14 Mar 2021 12:03)    CENTRAL LINE: [ ] YES [X ] NO  LOCATION:   DATE INSERTED:  REMOVE: [ ] YES [ ] NO  EXPLAIN:    RIVERA: [ ] YES [X] NO    DATE INSERTED:  REMOVE:  [ ] YES [ ] NO  EXPLAIN:    A-LINE:  [ ] YES [X ] NO  LOCATION:   DATE INSERTED:  REMOVE:  [ ] YES [ ] NO  EXPLAIN:    PMH -reviewed admission note, no change since admission    ICU Vital Signs Last 24 Hrs  T(C): 36.4 (22 Mar 2021 06:00), Max: 36.8 (21 Mar 2021 16:12)  T(F): 97.6 (22 Mar 2021 06:00), Max: 98.3 (21 Mar 2021 16:12)  HR: 72 (22 Mar 2021 06:00) (54 - 99)  BP: 132/69 (22 Mar 2021 06:00) (101/54 - 132/69)  BP(mean): 83 (22 Mar 2021 06:00) (63 - 84)  ABP: --  ABP(mean): --  RR: 31 (22 Mar 2021 06:00) (17 - 39)  SpO2: 95% (22 Mar 2021 06:00) (86% - 98%)        03-20 @ 07:01  -  03-21 @ 07:00  --------------------------------------------------------  IN: 1040 mL / OUT: 1350 mL / NET: -310 mL      PHYSICAL EXAM:    GENERAL: NAD, well-groomed, well-developed  HEAD:  Atraumatic, Normocephalic  EYES: EOMI, PERRLA, conjunctiva and sclera clear  ENMT: No tonsillar erythema, exudates, or enlargement; Moist mucous membranes, Good dentition, No lesions  NECK: Supple, normal appearance, No JVD; Normal thyroid; Trachea midline  NERVOUS SYSTEM:  Alert & Oriented X3, Good concentration; Motor Strength 5/5 B/L upper and lower extremities; DTRs 2+ intact and symmetric  CHEST/LUNG: No chest deformity; Normal percussion bilaterally; No rales, rhonchi, wheezing. On HFNC 40/100 + NRB 15 L  HEART: Regular rate and rhythm; No murmurs, rubs, or gallops  ABDOMEN: Soft, Nontender, Nondistended; Bowel sounds present  EXTREMITIES:  2+ Peripheral Pulses, No clubbing, cyanosis, or edema  LYMPH: No lymphadenopathy noted  SKIN: No rashes or lesions; Good capillary refill      LABS:  CBC Full  -  ( 22 Mar 2021 06:02 )  WBC Count : 13.05 K/uL  RBC Count : 5.04 M/uL  Hemoglobin : 16.0 g/dL  Hematocrit : 46.9 %  Platelet Count - Automated : 437 K/uL  Mean Cell Volume : 93.1 fl  Mean Cell Hemoglobin : 31.7 pg  Mean Cell Hemoglobin Concentration : 34.1 gm/dL  Auto Neutrophil # : x  Auto Lymphocyte # : x  Auto Monocyte # : x  Auto Eosinophil # : x  Auto Basophil # : x  Auto Neutrophil % : x  Auto Lymphocyte % : x  Auto Monocyte % : x  Auto Eosinophil % : x  Auto Basophil % : x    03-22    138  |  105  |  29<H>  ----------------------------<  91  4.5   |  24  |  0.82    Ca    9.0      22 Mar 2021 06:02    TPro  7.0  /  Alb  2.5<L>  /  TBili  1.2  /  DBili  x   /  AST  35  /  ALT  152<H>  /  AlkPhos  81  03-22        RADIOLOGY & ADDITIONAL STUDIES REVIEWED:  ***    GOALS OF CARE DISCUSSION WITH PATIENT/FAMILY/PROXY: FULL CODE    CRITICAL CARE TIME SPENT: 35 minutes INTERVAL HPI/OVERNIGHT EVENTS: Transient periods of desaturation overnight to high 80s on HFNC and NRB    Pt seen and examined at bedside this AM, appears in no acute distress on HFNC and NRB 15L-O2 96%. Pt reports feeling better today, denies shortness of breath or chest pain.     PRESSORS: [ ] YES [X ] NO  WHICH:    ANTIBIOTICS:                      Antimicrobial:    Cardiovascular:    Pulmonary:  ALBUTerol    90 MICROgram(s) HFA Inhaler 2 Puff(s) Inhalation every 6 hours    Hematalogic:  aspirin enteric coated 81 milliGRAM(s) Oral daily  enoxaparin Injectable 40 milliGRAM(s) SubCutaneous daily    Other:  acetaminophen   Tablet .. 650 milliGRAM(s) Oral every 6 hours PRN  ascorbic acid 1000 milliGRAM(s) Oral daily  chlorhexidine 2% Cloths 1 Application(s) Topical <User Schedule>  dexAMETHasone     Tablet 6 milliGRAM(s) Oral daily  insulin lispro (ADMELOG) corrective regimen sliding scale   SubCutaneous three times a day before meals  pantoprazole    Tablet 40 milliGRAM(s) Oral before breakfast    acetaminophen   Tablet .. 650 milliGRAM(s) Oral every 6 hours PRN  ALBUTerol    90 MICROgram(s) HFA Inhaler 2 Puff(s) Inhalation every 6 hours  ascorbic acid 1000 milliGRAM(s) Oral daily  aspirin enteric coated 81 milliGRAM(s) Oral daily  chlorhexidine 2% Cloths 1 Application(s) Topical <User Schedule>  dexAMETHasone     Tablet 6 milliGRAM(s) Oral daily  enoxaparin Injectable 40 milliGRAM(s) SubCutaneous daily  insulin lispro (ADMELOG) corrective regimen sliding scale   SubCutaneous three times a day before meals  pantoprazole    Tablet 40 milliGRAM(s) Oral before breakfast    Drug Dosing Weight  Height (cm): 167.6 (14 Mar 2021 12:03)  Weight (kg): 83.869 (14 Mar 2021 12:03)  BMI (kg/m2): 29.9 (14 Mar 2021 12:03)  BSA (m2): 1.93 (14 Mar 2021 12:03)    CENTRAL LINE: [ ] YES [X ] NO  LOCATION:   DATE INSERTED:  REMOVE: [ ] YES [ ] NO  EXPLAIN:    RIVERA: [ ] YES [X] NO    DATE INSERTED:  REMOVE:  [ ] YES [ ] NO  EXPLAIN:    A-LINE:  [ ] YES [X ] NO  LOCATION:   DATE INSERTED:  REMOVE:  [ ] YES [ ] NO  EXPLAIN:    PMH -reviewed admission note, no change since admission    ICU Vital Signs Last 24 Hrs  T(C): 36.4 (22 Mar 2021 06:00), Max: 36.8 (21 Mar 2021 16:12)  T(F): 97.6 (22 Mar 2021 06:00), Max: 98.3 (21 Mar 2021 16:12)  HR: 72 (22 Mar 2021 06:00) (54 - 99)  BP: 132/69 (22 Mar 2021 06:00) (101/54 - 132/69)  BP(mean): 83 (22 Mar 2021 06:00) (63 - 84)  ABP: --  ABP(mean): --  RR: 31 (22 Mar 2021 06:00) (17 - 39)  SpO2: 95% (22 Mar 2021 06:00) (86% - 98%)        03-20 @ 07:01  -  03-21 @ 07:00  --------------------------------------------------------  IN: 1040 mL / OUT: 1350 mL / NET: -310 mL      PHYSICAL EXAM:    GENERAL: NAD, well-groomed, well-developed  HEAD:  Atraumatic, Normocephalic  EYES: EOMI, PERRLA, conjunctiva and sclera clear  ENMT: No tonsillar erythema, exudates, or enlargement; Moist mucous membranes, Good dentition, No lesions  NECK: Supple, normal appearance, No JVD; Normal thyroid; Trachea midline  NERVOUS SYSTEM:  Alert & Oriented X3, Good concentration; Motor Strength 5/5 B/L upper and lower extremities; DTRs 2+ intact and symmetric  CHEST/LUNG: No chest deformity; Normal percussion bilaterally; No rales, rhonchi, wheezing. On HFNC 40/100 + NRB 15 L  HEART: Regular rate and rhythm; No murmurs, rubs, or gallops  ABDOMEN: Soft, Nontender, Nondistended; Bowel sounds present  EXTREMITIES:  2+ Peripheral Pulses, No clubbing, cyanosis, or edema  LYMPH: No lymphadenopathy noted  SKIN: No rashes or lesions; Good capillary refill      LABS:  CBC Full  -  ( 22 Mar 2021 06:02 )  WBC Count : 13.05 K/uL  RBC Count : 5.04 M/uL  Hemoglobin : 16.0 g/dL  Hematocrit : 46.9 %  Platelet Count - Automated : 437 K/uL  Mean Cell Volume : 93.1 fl  Mean Cell Hemoglobin : 31.7 pg  Mean Cell Hemoglobin Concentration : 34.1 gm/dL  Auto Neutrophil # : x  Auto Lymphocyte # : x  Auto Monocyte # : x  Auto Eosinophil # : x  Auto Basophil # : x  Auto Neutrophil % : x  Auto Lymphocyte % : x  Auto Monocyte % : x  Auto Eosinophil % : x  Auto Basophil % : x    03-22    138  |  105  |  29<H>  ----------------------------<  91  4.5   |  24  |  0.82    Ca    9.0      22 Mar 2021 06:02    TPro  7.0  /  Alb  2.5<L>  /  TBili  1.2  /  DBili  x   /  AST  35  /  ALT  152<H>  /  AlkPhos  81  03-22        RADIOLOGY & ADDITIONAL STUDIES REVIEWED:  ***    GOALS OF CARE DISCUSSION WITH PATIENT/FAMILY/PROXY: FULL CODE    CRITICAL CARE TIME SPENT: 35 minutes INTERVAL HPI/OVERNIGHT EVENTS: Transient periods of desaturation overnight to high 80s on HFNC and NRB    Pt seen and examined at bedside this AM, appears in no acute distress on HFNC and NRB 15L-O2 96%. Pt reports feeling better today, denies shortness of breath or chest pain.     PRESSORS: [ ] YES [X ] NO      Pulmonary:  ALBUTerol    90 MICROgram(s) HFA Inhaler 2 Puff(s) Inhalation every 6 hours    Hematalogic:  aspirin enteric coated 81 milliGRAM(s) Oral daily  enoxaparin Injectable 40 milliGRAM(s) SubCutaneous daily    Other:  acetaminophen   Tablet .. 650 milliGRAM(s) Oral every 6 hours PRN  ascorbic acid 1000 milliGRAM(s) Oral daily  chlorhexidine 2% Cloths 1 Application(s) Topical <User Schedule>  dexAMETHasone     Tablet 6 milliGRAM(s) Oral daily  insulin lispro (ADMELOG) corrective regimen sliding scale   SubCutaneous three times a day before meals  pantoprazole    Tablet 40 milliGRAM(s) Oral before breakfast    acetaminophen   Tablet .. 650 milliGRAM(s) Oral every 6 hours PRN  ALBUTerol    90 MICROgram(s) HFA Inhaler 2 Puff(s) Inhalation every 6 hours  ascorbic acid 1000 milliGRAM(s) Oral daily  aspirin enteric coated 81 milliGRAM(s) Oral daily  chlorhexidine 2% Cloths 1 Application(s) Topical <User Schedule>  dexAMETHasone     Tablet 6 milliGRAM(s) Oral daily  enoxaparin Injectable 40 milliGRAM(s) SubCutaneous daily  insulin lispro (ADMELOG) corrective regimen sliding scale   SubCutaneous three times a day before meals  pantoprazole    Tablet 40 milliGRAM(s) Oral before breakfast    Drug Dosing Weight  Height (cm): 167.6 (14 Mar 2021 12:03)  Weight (kg): 83.869 (14 Mar 2021 12:03)  BMI (kg/m2): 29.9 (14 Mar 2021 12:03)  BSA (m2): 1.93 (14 Mar 2021 12:03)    CENTRAL LINE: [ ] YES [X ] NO  LOCATION:   DATE INSERTED:  REMOVE: [ ] YES [ ] NO  EXPLAIN:    RIVERA: [ ] YES [X] NO    DATE INSERTED:  REMOVE:  [ ] YES [ ] NO  EXPLAIN:    A-LINE:  [ ] YES [X ] NO  LOCATION:   DATE INSERTED:  REMOVE:  [ ] YES [ ] NO  EXPLAIN:    PMH -reviewed admission note, no change since admission    ICU Vital Signs Last 24 Hrs  T(C): 36.4 (22 Mar 2021 06:00), Max: 36.8 (21 Mar 2021 16:12)  T(F): 97.6 (22 Mar 2021 06:00), Max: 98.3 (21 Mar 2021 16:12)  HR: 72 (22 Mar 2021 06:00) (54 - 99)  BP: 132/69 (22 Mar 2021 06:00) (101/54 - 132/69)  BP(mean): 83 (22 Mar 2021 06:00) (63 - 84)  ABP: --  ABP(mean): --  RR: 31 (22 Mar 2021 06:00) (17 - 39)  SpO2: 95% (22 Mar 2021 06:00) (86% - 98%)        03-20 @ 07:01  -  03-21 @ 07:00  --------------------------------------------------------  IN: 1040 mL / OUT: 1350 mL / NET: -310 mL      PHYSICAL EXAM:    GENERAL: NAD, well-groomed, well-developed, on hf/nr  HEAD:  Atraumatic, Normocephalic  EYES: EOMI, PERRLA, conjunctiva and sclera clear  ENMT: No tonsillar erythema, exudates, or enlargement; Moist mucous membranes, Good dentition, No lesions  NECK: Supple, normal appearance, No JVD; Normal thyroid; Trachea midline  NERVOUS SYSTEM:  Alert & Oriented X3, Good concentration; Motor Strength 5/5 B/L upper and lower extremities; DTRs 2+ intact and symmetric  CHEST/LUNG: No chest deformity; Normal percussion bilaterally; No rales, rhonchi, wheezing. On HFNC 40/100 + NRB 15 L  HEART: Regular rate and rhythm; No murmurs, rubs, or gallops  ABDOMEN: Soft, Nontender, Nondistended; Bowel sounds present  EXTREMITIES:  2+ Peripheral Pulses, No clubbing, cyanosis, or edema  LYMPH: No lymphadenopathy noted  SKIN: No rashes or lesions; Good capillary refill      LABS:  CBC Full  -  ( 22 Mar 2021 06:02 )  WBC Count : 13.05 K/uL  RBC Count : 5.04 M/uL  Hemoglobin : 16.0 g/dL  Hematocrit : 46.9 %  Platelet Count - Automated : 437 K/uL  Mean Cell Volume : 93.1 fl  Mean Cell Hemoglobin : 31.7 pg  Mean Cell Hemoglobin Concentration : 34.1 gm/dL  Auto Neutrophil # : x  Auto Lymphocyte # : x  Auto Monocyte # : x  Auto Eosinophil # : x  Auto Basophil # : x  Auto Neutrophil % : x  Auto Lymphocyte % : x  Auto Monocyte % : x  Auto Eosinophil % : x  Auto Basophil % : x    03-22    138  |  105  |  29<H>  ----------------------------<  91  4.5   |  24  |  0.82    Ca    9.0      22 Mar 2021 06:02    TPro  7.0  /  Alb  2.5<L>  /  TBili  1.2  /  DBili  x   /  AST  35  /  ALT  152<H>  /  AlkPhos  81  03-22      GOALS OF CARE DISCUSSION WITH PATIENT/FAMILY/PROXY: FULL CODE    CRITICAL CARE TIME SPENT: 35 minutes

## 2021-03-22 NOTE — PROGRESS NOTE ADULT - SUBJECTIVE AND OBJECTIVE BOX
Patient is a 55y old  Male who presents with a chief complaint of SOB (21 Mar 2021 13:26)    pt seen in icu [ x ], reg med floor [  ], bed [ x ], chair at bedside [   ], a+o x3 [ x ], lethargic [  ],    nad [ x ]      Allergies    No Known Allergies        Vitals    T(F): 97.6 (03-22-21 @ 06:00), Max: 98.3 (03-21-21 @ 16:12)  HR: 72 (03-22-21 @ 06:00) (54 - 99)  BP: 132/69 (03-22-21 @ 06:00) (101/54 - 132/69)  RR: 31 (03-22-21 @ 06:00) (17 - 39)  SpO2: 95% (03-22-21 @ 06:00) (86% - 98%)  Wt(kg): --  CAPILLARY BLOOD GLUCOSE      POCT Blood Glucose.: 99 mg/dL (22 Mar 2021 06:00)      Labs                          16.0   13.05 )-----------( 437      ( 22 Mar 2021 06:02 )             46.9       03-22    138  |  105  |  29<H>  ----------------------------<  91  4.5   |  24  |  0.82    Ca    9.0      22 Mar 2021 06:02    TPro  7.0  /  Alb  2.5<L>  /  TBili  1.2  /  DBili  x   /  AST  35  /  ALT  152<H>  /  AlkPhos  81  03-22            .Urine Clean Catch (Midstream)  03-15 @ 00:52   No growth  --  --          Radiology Results      Meds    MEDICATIONS  (STANDING):  ALBUTerol    90 MICROgram(s) HFA Inhaler 2 Puff(s) Inhalation every 6 hours  ascorbic acid 1000 milliGRAM(s) Oral daily  aspirin enteric coated 81 milliGRAM(s) Oral daily  chlorhexidine 2% Cloths 1 Application(s) Topical <User Schedule>  dexAMETHasone     Tablet 6 milliGRAM(s) Oral daily  enoxaparin Injectable 40 milliGRAM(s) SubCutaneous daily  insulin lispro (ADMELOG) corrective regimen sliding scale   SubCutaneous three times a day before meals  pantoprazole    Tablet 40 milliGRAM(s) Oral before breakfast      MEDICATIONS  (PRN):  acetaminophen   Tablet .. 650 milliGRAM(s) Oral every 6 hours PRN Temp greater or equal to 38C (100.4F), Moderate Pain (4 - 6)      Physical Exam    Neuro :  no focal deficits  Respiratory: CTA B/L  CV: RRR, S1S2, no murmurs,   Abdominal: Soft, NT, ND +BS,  Extremities: No edema, + peripheral pulses    ASSESSMENT    Hypoxemia 2nd to covid pna   transaminitis  prediabetes  h/o appendectomy  cholecystectomy        PLAN    contact and airborne isolation  d/c remdesevir given covid ab positive noted   cont dexamethasone  cont asa, vit c,    cont albuterol inhaler   pulm f/u  procalcitonin, D-dimer, crp, ldh, ferritin, lactate noted ,    cont tylenol prn,   cont robitussin prn   O2 sat (75% - 98%) hi miguel ángel  O2 via hi miguel ángel  pt low threshold for intubation   xray 3/19/21 with pneumomediastinum  rept cxr  icu cons noted  f/u acute hepatitis panel  lispro ss  cont current meds     Patient is a 55y old  Male who presents with a chief complaint of SOB (21 Mar 2021 13:26)    pt seen in icu [ x ], reg med floor [  ], bed [ x ], chair at bedside [   ], a+o x3 [ x ], lethargic [  ],    nad [ x ]      Allergies    No Known Allergies        Vitals    T(F): 97.6 (03-22-21 @ 06:00), Max: 98.3 (03-21-21 @ 16:12)  HR: 72 (03-22-21 @ 06:00) (54 - 99)  BP: 132/69 (03-22-21 @ 06:00) (101/54 - 132/69)  RR: 31 (03-22-21 @ 06:00) (17 - 39)  SpO2: 95% (03-22-21 @ 06:00) (86% - 98%)  Wt(kg): --  CAPILLARY BLOOD GLUCOSE      POCT Blood Glucose.: 99 mg/dL (22 Mar 2021 06:00)      Labs                          16.0   13.05 )-----------( 437      ( 22 Mar 2021 06:02 )             46.9       03-22    138  |  105  |  29<H>  ----------------------------<  91  4.5   |  24  |  0.82    Ca    9.0      22 Mar 2021 06:02    TPro  7.0  /  Alb  2.5<L>  /  TBili  1.2  /  DBili  x   /  AST  35  /  ALT  152<H>  /  AlkPhos  81  03-22            .Urine Clean Catch (Midstream)  03-15 @ 00:52   No growth  --  --          Radiology Results      Meds    MEDICATIONS  (STANDING):  ALBUTerol    90 MICROgram(s) HFA Inhaler 2 Puff(s) Inhalation every 6 hours  ascorbic acid 1000 milliGRAM(s) Oral daily  aspirin enteric coated 81 milliGRAM(s) Oral daily  chlorhexidine 2% Cloths 1 Application(s) Topical <User Schedule>  dexAMETHasone     Tablet 6 milliGRAM(s) Oral daily  enoxaparin Injectable 40 milliGRAM(s) SubCutaneous daily  insulin lispro (ADMELOG) corrective regimen sliding scale   SubCutaneous three times a day before meals  pantoprazole    Tablet 40 milliGRAM(s) Oral before breakfast      MEDICATIONS  (PRN):  acetaminophen   Tablet .. 650 milliGRAM(s) Oral every 6 hours PRN Temp greater or equal to 38C (100.4F), Moderate Pain (4 - 6)      Physical Exam    Neuro :  no focal deficits  Respiratory: CTA B/L  CV: RRR, S1S2, no murmurs,   Abdominal: Soft, NT, ND +BS,  Extremities: No edema, + peripheral pulses    ASSESSMENT    Hypoxemia 2nd to covid pna   transaminitis  prediabetes  h/o appendectomy  cholecystectomy        PLAN    contact and airborne isolation  d/c remdesevir given covid ab positive noted   cont dexamethasone  cont asa, vit c,    cont albuterol inhaler   pulm f/u  procalcitonin, D-dimer, crp, ldh, ferritin, lactate noted ,    cont tylenol prn,   cont robitussin prn   O2 sat (86% - 98%) hi miguel ángel  O2 via hi miguel ángel  pt low threshold for intubation   xray 3/19/21 with pneumomediastinum  rept cxr  icu cons noted  f/u acute hepatitis panel  lispro ss  cont current meds

## 2021-03-22 NOTE — PHYSICAL THERAPY INITIAL EVALUATION ADULT - DISCHARGE DISPOSITION, PT EVAL
Home PT, pending medical and functional improvement. Primary limitation is aerobic capacity/endurance secondary to medical pathology. Mobility likely to significantly improve if oxygen saturations improve and be maintained with activity.

## 2021-03-22 NOTE — PROGRESS NOTE ADULT - SUBJECTIVE AND OBJECTIVE BOX
Pt is awake, alert, lying in bed. Still in ICU on HFNC     INTERVAL HPI/OVERNIGHT EVENTS:      VITAL SIGNS:  T(F): 97.6 (03-22-21 @ 06:00)  HR: 84 (03-22-21 @ 12:26)  BP: 140/71 (03-22-21 @ 10:50)  RR: 35 (03-22-21 @ 10:50)  SpO2: 97% (03-22-21 @ 12:26)  Wt(kg): --  I&O's Detail    21 Mar 2021 07:01  -  22 Mar 2021 07:00  --------------------------------------------------------  IN:    Oral Fluid: 540 mL  Total IN: 540 mL    OUT:    Voided (mL): 800 mL  Total OUT: 800 mL    Total NET: -260 mL              REVIEW OF SYSTEMS:    CONSTITUTIONAL:  No fevers, chills, sweats    HEENT:  Eyes:  No diplopia or blurred vision. ENT:  No earache, sore throat or runny nose.    CARDIOVASCULAR:  No pressure, squeezing, tightness, or heaviness about the chest; no palpitations.    RESPIRATORY:  Per HPI    GASTROINTESTINAL:  No abdominal pain, nausea, vomiting or diarrhea.    GENITOURINARY:  No dysuria, frequency or urgency.    NEUROLOGIC:  No paresthesias, fasciculations, seizures or weakness.    PSYCHIATRIC:  No disorder of thought or mood.      PHYSICAL EXAM:    Constitutional: Well developed and nourished  Eyes:Perrla  ENMT: normal  Neck:supple  Respiratory: good air entry  Cardiovascular: S1 S2 regular  Gastrointestinal: Soft, Non tender  Extremities: No edema  Vascular:normal  Neurological:Awake, alert,Ox3  Musculoskeletal:Normal      MEDICATIONS  (STANDING):  ALBUTerol    90 MICROgram(s) HFA Inhaler 2 Puff(s) Inhalation every 6 hours  ascorbic acid 1000 milliGRAM(s) Oral daily  aspirin enteric coated 81 milliGRAM(s) Oral daily  chlorhexidine 2% Cloths 1 Application(s) Topical <User Schedule>  dexAMETHasone     Tablet 6 milliGRAM(s) Oral daily  enoxaparin Injectable 40 milliGRAM(s) SubCutaneous daily  insulin lispro (ADMELOG) corrective regimen sliding scale   SubCutaneous three times a day before meals  pantoprazole    Tablet 40 milliGRAM(s) Oral before breakfast    MEDICATIONS  (PRN):  acetaminophen   Tablet .. 650 milliGRAM(s) Oral every 6 hours PRN Temp greater or equal to 38C (100.4F), Moderate Pain (4 - 6)      Allergies    No Known Allergies    Intolerances        LABS:                        16.0   13.05 )-----------( 437      ( 22 Mar 2021 06:02 )             46.9     03-22    138  |  105  |  29<H>  ----------------------------<  91  4.5   |  24  |  0.82    Ca    9.0      22 Mar 2021 06:02    TPro  7.0  /  Alb  2.5<L>  /  TBili  1.2  /  DBili  x   /  AST  35  /  ALT  152<H>  /  AlkPhos  81  03-22              CAPILLARY BLOOD GLUCOSE      POCT Blood Glucose.: 158 mg/dL (22 Mar 2021 11:28)  POCT Blood Glucose.: 99 mg/dL (22 Mar 2021 06:00)  POCT Blood Glucose.: 128 mg/dL (21 Mar 2021 16:46)    pro-bnp -- 03-20 @ 04:23     d-dimer 229  03-20 @ 04:23  pro-bnp -- 03-17 @ 10:55     d-dimer 151  03-17 @ 10:55      RADIOLOGY & ADDITIONAL TESTS:    CXR:    Ct scan chest:    ekg;    echo:

## 2021-03-22 NOTE — PROGRESS NOTE ADULT - ATTENDING COMMENTS
IMP: This is a 55 yr old  man , non smoker with  moody 1990s presented 3/14 with x9 days worsening cough, subjective fevers, and SOB, with x2-3 days dysuria and central, non-radiating, constant CP. Admitted to medicine unit  for acute hypoxic respiratory failure secondary to pna from covid-19 infection .     Assessment:  1. Acute hypoxic respiratory failure  2 Covid-19 infection   3. Transaminitis  4. Prediabetes    Plan   -isolation : contact and air borne   -HFNC support needed to maintain O2 sat>90%  -monitor biomarkers daily  -dvt/gi prophy  -continue decadron 6 mg /day x 10 days total then taper  -not a candidate for remdesivir dut to covid-19 antibodies and elevated LFT  -not a candidate for Tociluzimab due to elevated LFT  -hemodynamic monitoring   -self prone as tolerated   -Encourage incentive spirometry  -Oral diet  -OOB to chair   -PT evaluation

## 2021-03-22 NOTE — PHYSICAL THERAPY INITIAL EVALUATION ADULT - GENERAL OBSERVATIONS, REHAB EVAL
Consult received, chart reviewed. Patient received supine in bed, NAD, +HFNC, +NRB, + Cardiac monitoring RASS -; CAM ICU (-)  Patient agreed to EVALUATION from Physical Therapist.

## 2021-03-23 LAB
ALBUMIN SERPL ELPH-MCNC: 2.4 G/DL — LOW (ref 3.5–5)
ALP SERPL-CCNC: 85 U/L — SIGNIFICANT CHANGE UP (ref 40–120)
ALT FLD-CCNC: 148 U/L DA — HIGH (ref 10–60)
ANION GAP SERPL CALC-SCNC: 11 MMOL/L — SIGNIFICANT CHANGE UP (ref 5–17)
AST SERPL-CCNC: 40 U/L — SIGNIFICANT CHANGE UP (ref 10–40)
BILIRUB SERPL-MCNC: 1.3 MG/DL — HIGH (ref 0.2–1.2)
BUN SERPL-MCNC: 28 MG/DL — HIGH (ref 7–18)
CALCIUM SERPL-MCNC: 8.6 MG/DL — SIGNIFICANT CHANGE UP (ref 8.4–10.5)
CHLORIDE SERPL-SCNC: 103 MMOL/L — SIGNIFICANT CHANGE UP (ref 96–108)
CO2 SERPL-SCNC: 22 MMOL/L — SIGNIFICANT CHANGE UP (ref 22–31)
CREAT SERPL-MCNC: 0.71 MG/DL — SIGNIFICANT CHANGE UP (ref 0.5–1.3)
CRP SERPL-MCNC: 47 MG/L — HIGH
FERRITIN SERPL-MCNC: 1835 NG/ML — HIGH (ref 30–400)
FERRITIN SERPL-MCNC: 2110 NG/ML — HIGH (ref 30–400)
GLUCOSE BLDC GLUCOMTR-MCNC: 125 MG/DL — HIGH (ref 70–99)
GLUCOSE BLDC GLUCOMTR-MCNC: 138 MG/DL — HIGH (ref 70–99)
GLUCOSE BLDC GLUCOMTR-MCNC: 91 MG/DL — SIGNIFICANT CHANGE UP (ref 70–99)
GLUCOSE SERPL-MCNC: 86 MG/DL — SIGNIFICANT CHANGE UP (ref 70–99)
HCT VFR BLD CALC: 47.5 % — SIGNIFICANT CHANGE UP (ref 39–50)
HGB BLD-MCNC: 16 G/DL — SIGNIFICANT CHANGE UP (ref 13–17)
MAGNESIUM SERPL-MCNC: 2.3 MG/DL — SIGNIFICANT CHANGE UP (ref 1.6–2.6)
MCHC RBC-ENTMCNC: 31.9 PG — SIGNIFICANT CHANGE UP (ref 27–34)
MCHC RBC-ENTMCNC: 33.7 GM/DL — SIGNIFICANT CHANGE UP (ref 32–36)
MCV RBC AUTO: 94.6 FL — SIGNIFICANT CHANGE UP (ref 80–100)
NRBC # BLD: 0 /100 WBCS — SIGNIFICANT CHANGE UP (ref 0–0)
PHOSPHATE SERPL-MCNC: 2.7 MG/DL — SIGNIFICANT CHANGE UP (ref 2.5–4.5)
PLATELET # BLD AUTO: 406 K/UL — HIGH (ref 150–400)
POTASSIUM SERPL-MCNC: 4.6 MMOL/L — SIGNIFICANT CHANGE UP (ref 3.5–5.3)
POTASSIUM SERPL-SCNC: 4.6 MMOL/L — SIGNIFICANT CHANGE UP (ref 3.5–5.3)
PROT SERPL-MCNC: 6.9 G/DL — SIGNIFICANT CHANGE UP (ref 6–8.3)
RBC # BLD: 5.02 M/UL — SIGNIFICANT CHANGE UP (ref 4.2–5.8)
RBC # FLD: 13.2 % — SIGNIFICANT CHANGE UP (ref 10.3–14.5)
SODIUM SERPL-SCNC: 136 MMOL/L — SIGNIFICANT CHANGE UP (ref 135–145)
T3FREE SERPL-MCNC: 1.54 PG/ML — LOW (ref 1.8–4.6)
T4 FREE SERPL-MCNC: 1.8 NG/DL — SIGNIFICANT CHANGE UP (ref 0.9–1.8)
WBC # BLD: 13.78 K/UL — HIGH (ref 3.8–10.5)
WBC # FLD AUTO: 13.78 K/UL — HIGH (ref 3.8–10.5)

## 2021-03-23 PROCEDURE — 71045 X-RAY EXAM CHEST 1 VIEW: CPT | Mod: 26

## 2021-03-23 RX ADMIN — ALBUTEROL 2 PUFF(S): 90 AEROSOL, METERED ORAL at 16:13

## 2021-03-23 RX ADMIN — ENOXAPARIN SODIUM 40 MILLIGRAM(S): 100 INJECTION SUBCUTANEOUS at 11:14

## 2021-03-23 RX ADMIN — Medication 6 MILLIGRAM(S): at 05:34

## 2021-03-23 RX ADMIN — Medication 81 MILLIGRAM(S): at 11:14

## 2021-03-23 RX ADMIN — CHLORHEXIDINE GLUCONATE 1 APPLICATION(S): 213 SOLUTION TOPICAL at 05:34

## 2021-03-23 RX ADMIN — PANTOPRAZOLE SODIUM 40 MILLIGRAM(S): 20 TABLET, DELAYED RELEASE ORAL at 06:33

## 2021-03-23 RX ADMIN — Medication 1000 MILLIGRAM(S): at 11:14

## 2021-03-23 RX ADMIN — ALBUTEROL 2 PUFF(S): 90 AEROSOL, METERED ORAL at 09:30

## 2021-03-23 RX ADMIN — ALBUTEROL 2 PUFF(S): 90 AEROSOL, METERED ORAL at 21:07

## 2021-03-23 NOTE — PROGRESS NOTE ADULT - SUBJECTIVE AND OBJECTIVE BOX
INTERVAL HPI/OVERNIGHT EVENTS: ***    PRESSORS: [ ] YES [ ] NO  WHICH:    ANTIBIOTICS:                      Antimicrobial:    Cardiovascular:    Pulmonary:  ALBUTerol    90 MICROgram(s) HFA Inhaler 2 Puff(s) Inhalation every 6 hours    Hematalogic:  aspirin enteric coated 81 milliGRAM(s) Oral daily  enoxaparin Injectable 40 milliGRAM(s) SubCutaneous daily    Other:  acetaminophen   Tablet .. 650 milliGRAM(s) Oral every 6 hours PRN  ascorbic acid 1000 milliGRAM(s) Oral daily  chlorhexidine 2% Cloths 1 Application(s) Topical <User Schedule>  dexAMETHasone     Tablet 6 milliGRAM(s) Oral daily  insulin lispro (ADMELOG) corrective regimen sliding scale   SubCutaneous three times a day before meals  pantoprazole    Tablet 40 milliGRAM(s) Oral before breakfast    acetaminophen   Tablet .. 650 milliGRAM(s) Oral every 6 hours PRN  ALBUTerol    90 MICROgram(s) HFA Inhaler 2 Puff(s) Inhalation every 6 hours  ascorbic acid 1000 milliGRAM(s) Oral daily  aspirin enteric coated 81 milliGRAM(s) Oral daily  chlorhexidine 2% Cloths 1 Application(s) Topical <User Schedule>  dexAMETHasone     Tablet 6 milliGRAM(s) Oral daily  enoxaparin Injectable 40 milliGRAM(s) SubCutaneous daily  insulin lispro (ADMELOG) corrective regimen sliding scale   SubCutaneous three times a day before meals  pantoprazole    Tablet 40 milliGRAM(s) Oral before breakfast    Drug Dosing Weight  Height (cm): 167.6 (14 Mar 2021 12:03)  Weight (kg): 83.869 (14 Mar 2021 12:03)  BMI (kg/m2): 29.9 (14 Mar 2021 12:03)  BSA (m2): 1.93 (14 Mar 2021 12:03)    CENTRAL LINE: [ ] YES [ ] NO  LOCATION:   DATE INSERTED:  REMOVE: [ ] YES [ ] NO  EXPLAIN:    RIVERA: [ ] YES [ ] NO    DATE INSERTED:  REMOVE:  [ ] YES [ ] NO  EXPLAIN:    A-LINE:  [ ] YES [ ] NO  LOCATION:   DATE INSERTED:  REMOVE:  [ ] YES [ ] NO  EXPLAIN:    PMH -reviewed admission note, no change since admission    ICU Vital Signs Last 24 Hrs  T(C): 35.9 (23 Mar 2021 05:00), Max: 36.7 (22 Mar 2021 12:00)  T(F): 96.7 (23 Mar 2021 05:00), Max: 98.1 (22 Mar 2021 12:00)  HR: 75 (23 Mar 2021 06:00) (62 - 103)  BP: 120/39 (23 Mar 2021 06:00) (114/56 - 143/82)  BP(mean): 53 (23 Mar 2021 06:00) (53 - 95)  ABP: --  ABP(mean): --  RR: 28 (23 Mar 2021 06:00) (20 - 39)  SpO2: 96% (23 Mar 2021 06:00) (92% - 99%)            03-22 @ 07:01  -  03-23 @ 07:00  --------------------------------------------------------  IN: 640 mL / OUT: 850 mL / NET: -210 mL            PHYSICAL EXAM:    GENERAL: NAD, well-groomed, well-developed  HEAD:  Atraumatic, Normocephalic  EYES: EOMI, PERRLA, conjunctiva and sclera clear  ENMT: No tonsillar erythema, exudates, or enlargement; Moist mucous membranes, Good dentition, No lesions  NECK: Supple, normal appearance, No JVD; Normal thyroid; Trachea midline  NERVOUS SYSTEM:  Alert & Oriented X3, Good concentration; Motor Strength 5/5 B/L upper and lower extremities; DTRs 2+ intact and symmetric  CHEST/LUNG: No chest deformity; Normal percussion bilaterally; No rales, rhonchi, wheezing   HEART: Regular rate and rhythm; No murmurs, rubs, or gallops  ABDOMEN: Soft, Nontender, Nondistended; Bowel sounds present  EXTREMITIES:  2+ Peripheral Pulses, No clubbing, cyanosis, or edema  LYMPH: No lymphadenopathy noted  SKIN: No rashes or lesions; Good capillary refill      LABS:  CBC Full  -  ( 23 Mar 2021 06:29 )  WBC Count : 13.78 K/uL  RBC Count : 5.02 M/uL  Hemoglobin : 16.0 g/dL  Hematocrit : 47.5 %  Platelet Count - Automated : 406 K/uL  Mean Cell Volume : 94.6 fl  Mean Cell Hemoglobin : 31.9 pg  Mean Cell Hemoglobin Concentration : 33.7 gm/dL  Auto Neutrophil # : x  Auto Lymphocyte # : x  Auto Monocyte # : x  Auto Eosinophil # : x  Auto Basophil # : x  Auto Neutrophil % : x  Auto Lymphocyte % : x  Auto Monocyte % : x  Auto Eosinophil % : x  Auto Basophil % : x    03-22    138  |  105  |  29<H>  ----------------------------<  91  4.5   |  24  |  0.82    Ca    9.0      22 Mar 2021 06:02    TPro  7.0  /  Alb  2.5<L>  /  TBili  1.2  /  DBili  x   /  AST  35  /  ALT  152<H>  /  AlkPhos  81  03-22            RADIOLOGY & ADDITIONAL STUDIES REVIEWED:  ***    GOALS OF CARE DISCUSSION WITH PATIENT/FAMILY/PROXY:    CRITICAL CARE TIME SPENT: 35 minutes INTERVAL HPI/OVERNIGHT EVENTS: Pnemomediatinum on xray 3/19. monitoring . Pt on HF.      Pulmonary:  ALBUTerol    90 MICROgram(s) HFA Inhaler 2 Puff(s) Inhalation every 6 hours    Hematalogic:  aspirin enteric coated 81 milliGRAM(s) Oral daily  enoxaparin Injectable 40 milliGRAM(s) SubCutaneous daily    Other:  acetaminophen   Tablet .. 650 milliGRAM(s) Oral every 6 hours PRN  ascorbic acid 1000 milliGRAM(s) Oral daily  chlorhexidine 2% Cloths 1 Application(s) Topical <User Schedule>  dexAMETHasone     Tablet 6 milliGRAM(s) Oral daily  insulin lispro (ADMELOG) corrective regimen sliding scale   SubCutaneous three times a day before meals  pantoprazole    Tablet 40 milliGRAM(s) Oral before breakfast    acetaminophen   Tablet .. 650 milliGRAM(s) Oral every 6 hours PRN  ALBUTerol    90 MICROgram(s) HFA Inhaler 2 Puff(s) Inhalation every 6 hours  ascorbic acid 1000 milliGRAM(s) Oral daily  aspirin enteric coated 81 milliGRAM(s) Oral daily  chlorhexidine 2% Cloths 1 Application(s) Topical <User Schedule>  dexAMETHasone     Tablet 6 milliGRAM(s) Oral daily  enoxaparin Injectable 40 milliGRAM(s) SubCutaneous daily  insulin lispro (ADMELOG) corrective regimen sliding scale   SubCutaneous three times a day before meals  pantoprazole    Tablet 40 milliGRAM(s) Oral before breakfast    Drug Dosing Weight  Height (cm): 167.6 (14 Mar 2021 12:03)  Weight (kg): 83.869 (14 Mar 2021 12:03)  BMI (kg/m2): 29.9 (14 Mar 2021 12:03)  BSA (m2): 1.93 (14 Mar 2021 12:03)    CENTRAL LINE: [ ] YES [ X] NO  LOCATION:   DATE INSERTED:  REMOVE: [ ] YES [ ] NO  EXPLAIN:    RIVERA: [ ] YES [ ] NO    DATE INSERTED:  REMOVE:  [ ] YES [ ] NO  EXPLAIN:    A-LINE:  [ ] YES [X ] NO  LOCATION:   DATE INSERTED:  REMOVE:  [ ] YES [ ] NO  EXPLAIN:    PMH -reviewed admission note, no change since admission    ICU Vital Signs Last 24 Hrs  T(C): 35.9 (23 Mar 2021 05:00), Max: 36.7 (22 Mar 2021 12:00)  T(F): 96.7 (23 Mar 2021 05:00), Max: 98.1 (22 Mar 2021 12:00)  HR: 75 (23 Mar 2021 06:00) (62 - 103)  BP: 120/39 (23 Mar 2021 06:00) (114/56 - 143/82)  BP(mean): 53 (23 Mar 2021 06:00) (53 - 95)  ABP: --  ABP(mean): --  RR: 28 (23 Mar 2021 06:00) (20 - 39)  SpO2: 96% (23 Mar 2021 06:00) (92% - 99%)            03-22 @ 07:01  -  03-23 @ 07:00  --------------------------------------------------------  IN: 640 mL / OUT: 850 mL / NET: -210 mL            PHYSICAL EXAM:    GENERAL: NAD, well-groomed, well-developed  HEAD:  Atraumatic, Normocephalic  EYES: EOMI, PERRLA, conjunctiva and sclera clear  ENMT: No tonsillar erythema, exudates, or enlargement; Moist mucous membranes, Good dentition, No lesions  NECK: Supple, normal appearance, No JVD; Normal thyroid; Trachea midline  NERVOUS SYSTEM:  Alert & Oriented X3, Good concentration; Motor Strength 5/5 B/L upper and lower extremities; DTRs 2+ intact and symmetric  CHEST/LUNG: No chest deformity; Normal percussion bilaterally; No rales, rhonchi, wheezing   HEART: Regular rate and rhythm; No murmurs, rubs, or gallops  ABDOMEN: Soft, Nontender, Nondistended; Bowel sounds present  EXTREMITIES:  2+ Peripheral Pulses, No clubbing, cyanosis, or edema  LYMPH: No lymphadenopathy noted  SKIN: No rashes or lesions; Good capillary refill      LABS:  CBC Full  -  ( 23 Mar 2021 06:29 )  WBC Count : 13.78 K/uL  RBC Count : 5.02 M/uL  Hemoglobin : 16.0 g/dL  Hematocrit : 47.5 %  Platelet Count - Automated : 406 K/uL  Mean Cell Volume : 94.6 fl  Mean Cell Hemoglobin : 31.9 pg  Mean Cell Hemoglobin Concentration : 33.7 gm/dL  Auto Neutrophil # : x  Auto Lymphocyte # : x  Auto Monocyte # : x  Auto Eosinophil # : x  Auto Basophil # : x  Auto Neutrophil % : x  Auto Lymphocyte % : x  Auto Monocyte % : x  Auto Eosinophil % : x  Auto Basophil % : x    03-22    138  |  105  |  29<H>  ----------------------------<  91  4.5   |  24  |  0.82    Ca    9.0      22 Mar 2021 06:02    TPro  7.0  /  Alb  2.5<L>  /  TBili  1.2  /  DBili  x   /  AST  35  /  ALT  152<H>  /  AlkPhos  81  03-22            RADIOLOGY & ADDITIONAL STUDIES REVIEWED:  ***    GOALS OF CARE DISCUSSION WITH PATIENT/FAMILY/PROXY:    CRITICAL CARE TIME SPENT: 35 minutes INTERVAL HPI/OVERNIGHT EVENTS: Pnemomediatinum on xray 3/19. monitoring . Pt on HF.      Pulmonary:  ALBUTerol    90 MICROgram(s) HFA Inhaler 2 Puff(s) Inhalation every 6 hours    Hematalogic:  aspirin enteric coated 81 milliGRAM(s) Oral daily  enoxaparin Injectable 40 milliGRAM(s) SubCutaneous daily    Other:  acetaminophen   Tablet .. 650 milliGRAM(s) Oral every 6 hours PRN  ascorbic acid 1000 milliGRAM(s) Oral daily  chlorhexidine 2% Cloths 1 Application(s) Topical <User Schedule>  dexAMETHasone     Tablet 6 milliGRAM(s) Oral daily  insulin lispro (ADMELOG) corrective regimen sliding scale   SubCutaneous three times a day before meals  pantoprazole    Tablet 40 milliGRAM(s) Oral before breakfast    acetaminophen   Tablet .. 650 milliGRAM(s) Oral every 6 hours PRN  ALBUTerol    90 MICROgram(s) HFA Inhaler 2 Puff(s) Inhalation every 6 hours  ascorbic acid 1000 milliGRAM(s) Oral daily  aspirin enteric coated 81 milliGRAM(s) Oral daily  chlorhexidine 2% Cloths 1 Application(s) Topical <User Schedule>  dexAMETHasone     Tablet 6 milliGRAM(s) Oral daily  enoxaparin Injectable 40 milliGRAM(s) SubCutaneous daily  insulin lispro (ADMELOG) corrective regimen sliding scale   SubCutaneous three times a day before meals  pantoprazole    Tablet 40 milliGRAM(s) Oral before breakfast    Drug Dosing Weight  Height (cm): 167.6 (14 Mar 2021 12:03)  Weight (kg): 83.869 (14 Mar 2021 12:03)  BMI (kg/m2): 29.9 (14 Mar 2021 12:03)  BSA (m2): 1.93 (14 Mar 2021 12:03)    CENTRAL LINE: [ ] YES [ X] NO  LOCATION:   DATE INSERTED:  REMOVE: [ ] YES [ ] NO  EXPLAIN:    RIVERA: [ ] YES [ ] NO    DATE INSERTED:  REMOVE:  [ ] YES [ ] NO  EXPLAIN:    A-LINE:  [ ] YES [X ] NO  LOCATION:   DATE INSERTED:  REMOVE:  [ ] YES [ ] NO  EXPLAIN:    PMH -reviewed admission note, no change since admission    ICU Vital Signs Last 24 Hrs  T(C): 35.9 (23 Mar 2021 05:00), Max: 36.7 (22 Mar 2021 12:00)  T(F): 96.7 (23 Mar 2021 05:00), Max: 98.1 (22 Mar 2021 12:00)  HR: 75 (23 Mar 2021 06:00) (62 - 103)  BP: 120/39 (23 Mar 2021 06:00) (114/56 - 143/82)  BP(mean): 53 (23 Mar 2021 06:00) (53 - 95)  ABP: --  ABP(mean): --  RR: 28 (23 Mar 2021 06:00) (20 - 39)  SpO2: 96% (23 Mar 2021 06:00) (92% - 99%)            03-22 @ 07:01  -  03-23 @ 07:00  --------------------------------------------------------  IN: 640 mL / OUT: 850 mL / NET: -210 mL            PHYSICAL EXAM:    GENERAL: NAD, well-groomed, well-developed, on hf/nr  HEAD:  Atraumatic, Normocephalic  EYES: EOMI, PERRLA, conjunctiva and sclera clear  ENMT: No tonsillar erythema, exudates, or enlargement; Moist mucous membranes, Good dentition, No lesions  NECK: Supple, normal appearance, No JVD; Normal thyroid; Trachea midline  NERVOUS SYSTEM:  Alert & Oriented X3, Good concentration; Motor Strength 5/5 B/L upper and lower extremities; DTRs 2+ intact and symmetric  CHEST/LUNG: No chest deformity; Normal percussion bilaterally; No rales, rhonchi, wheezing. On HFNC 40/100 + NRB 15 L  HEART: Regular rate and rhythm; No murmurs, rubs, or gallops  ABDOMEN: Soft, Nontender, Nondistended; Bowel sounds present  EXTREMITIES:  2+ Peripheral Pulses, No clubbing, cyanosis, or edema  LYMPH: No lymphadenopathy noted  SKIN: No rashes or lesions; Good capillary refill    LABS:  CBC Full  -  ( 23 Mar 2021 06:29 )  WBC Count : 13.78 K/uL  RBC Count : 5.02 M/uL  Hemoglobin : 16.0 g/dL  Hematocrit : 47.5 %  Platelet Count - Automated : 406 K/uL  Mean Cell Volume : 94.6 fl  Mean Cell Hemoglobin : 31.9 pg  Mean Cell Hemoglobin Concentration : 33.7 gm/dL  Auto Neutrophil # : x  Auto Lymphocyte # : x  Auto Monocyte # : x  Auto Eosinophil # : x  Auto Basophil # : x  Auto Neutrophil % : x  Auto Lymphocyte % : x  Auto Monocyte % : x  Auto Eosinophil % : x  Auto Basophil % : x    03-22    138  |  105  |  29<H>  ----------------------------<  91  4.5   |  24  |  0.82    Ca    9.0      22 Mar 2021 06:02    TPro  7.0  /  Alb  2.5<L>  /  TBili  1.2  /  DBili  x   /  AST  35  /  ALT  152<H>  /  AlkPhos  81  03-22      GOALS OF CARE DISCUSSION WITH PATIENT/FAMILY/PROXY: full code    CRITICAL CARE TIME SPENT: 35 minutes

## 2021-03-23 NOTE — PROGRESS NOTE ADULT - ATTENDING COMMENTS
55 yr old  man , non smoker with  moody 1990s presented 3/14 with x9 days worsening cough, subjective fevers, and SOB, with x2-3 days dysuria and central, non-radiating, constant CP. Admitted to medicine unit  for acute hypoxic respiratory failure secondary to pna from covid-19 infection .     Assessment:  1. Acute hypoxic respiratory failure  2 Covid-19 infection   3. Transaminitis  4. Prediabetes  5. Bilateral pneumothorax    Plan   -Thoracic surgery consult appreciated  -Repeat CXR and monitor respiratory status  -isolation : contact and air borne   -HFNC support needed to maintain O2 sat>90%  -monitor biomarkers daily  -dvt/gi prophy  -continue decadron 6 mg /day x 10 days total then taper  -not a candidate for remdesivir dut to covid-19 antibodies and elevated LFT  -not a candidate for Tociluzimab due to elevated LFT  -hemodynamic monitoring   -self prone as tolerated   -Encourage incentive spirometry  -Oral diet  -OOB to chair   -PT evaluation .

## 2021-03-23 NOTE — CONSULT NOTE ADULT - SUBJECTIVE AND OBJECTIVE BOX
54 yo male admitted 3-14 sob, noted covid positive, was on remdesivir, stearoids, pt presently on hfnc,  was noted pneumomediastinum 3-19 on cxr, trace ptx right and mild left ptx on cxr 3-22.  This am cxr read as stable ptx. Pt offers no new complaints, states resiration today is improved comprad to past 2 days. No cp.    alert nad, sitting in chair hfnc  Lungs: good air entry bilat. no chest wall crepitus                          16.0   13.78 )-----------( 406      ( 23 Mar 2021 06:29 )             47.5     03-23    136  |  103  |  28<H>  ----------------------------<  86  4.6   |  22  |  0.71    Ca    8.6      23 Mar 2021 06:29  Phos  2.7     03-23  Mg     2.3     03-23    TPro  6.9  /  Alb  2.4<L>  /  TBili  1.3<H>  /  DBili  x   /  AST  40  /  ALT  148<H>  /  AlkPhos  85  03-23            < from: Xray Chest 1 View- PORTABLE-Routine (Xray Chest 1 View- PORTABLE-Routine in AM.) (03.23.21 @ 09:26) >  IMPRESSION: Grossly stable trace right apical pneumothorax. Grossly stable mild left apical pneumothorax.    Stable small pneumomediastinum.    Soft tissue emphysema at the neck bases again noted.    Stable patchy bilateral pulmonary infiltrates.    No evidence for pleural effusion.    The trachea is midline.    Stable cardiac silhouette.     is informed.    < end of copied text >

## 2021-03-23 NOTE — PROGRESS NOTE ADULT - ASSESSMENT
ASSESSMENT AND PLAN:  55M, no PMH, PSH appy and moody 1990s presented 3/14 with x9 days worsening cough, subjective fevers, and SOB, with x2-3 days dysuria and central, non-radiating, constant CP. Admitted to [facility] for acute hypoxic respiratory failure s/t covid pneumonia, ICU consulted for increasing work of breathing on 15lpm NRM.     1. Acute hypoxic respiratory failure  2. Covid pneumonia  3. Transaminitis  4. Prediabetes  5. Abnormal TSH    =================== Neuro============================  Alert and oriented x 3     ================= Cardiovascular==========================  Hypertension  resolved  - Initially, noted to have multiple elevated BP readings  -Normotensive at this time   -Continue to monitor, may consider low dose acei/arb if hypertensive     ================- Pulm=================================  Acute hypoxic respiratory failure: secondary to covid pneumonia    -On HFNC + NRB 15L  -CXR on admission with b/l infiltrates,   -D-dimer initially elevated on presentation, now wnl  -Remdesivir was discontinued due to positive antibodies   -Continue decadron, add albuterol prn   -procalcitonin wnl  -Encourage use of incentive spirometry  -OOB/PT    ==================ID===================================  Covid pneumonia    -mild leukocytosis  -remains afebrile  -plan as above     ================= Nephro================================  -No issues   -voiding without issue  -monitor lytes, SCr   -monitor I/Os    =================GI====================================  Transaminitis: likely secondary to covid     - and  on presentation  -Improving- AST 35/  -continue to monitor, f/u hepatitis panel    ================ Heme==================================  Elevated d-dimer: likely secondary to covid    -d-dimer 423 on presentation, now wnl  -continue prophylactic Lovenox 40 mg QD    =================Endocrine===============================  Prediabetes:    -a1c  5.8  -BS controlled  -continue HSS  -monitor FS while on steroids    Abnormal TSH:  -TSH level noted 0.26,   -TSH 0.37 and   -f/u Free T4      ================= Skin/Catheters============================  No rashes. Peripheral IV lines.     - =================Prophylaxis =============================  Lovenox for DVT proph  Protonix for  GI proph    ==================GOC==================================   FULL CODE ASSESSMENT AND PLAN:  55M, no PMH, PSH appy and moody 1990s presented 3/14 with x9 days worsening cough, subjective fevers, and SOB, with x2-3 days dysuria and central, non-radiating, constant CP. Admitted to [facility] for acute hypoxic respiratory failure s/t covid pneumonia, ICU consulted for increasing work of breathing on 15lpm NRM.     1. Acute hypoxic respiratory failure  2. Covid pneumonia  3. Transaminitis  4. Prediabetes  5. Abnormal TSH    =================== Neuro============================  Alert and oriented x 3     ================= Cardiovascular==========================  Hypertension  resolved  - Initially, noted to have multiple elevated BP readings  -Normotensive at this time   -Continue to monitor, may consider low dose acei/arb if hypertensive     ================- Pulm=================================  Acute hypoxic respiratory failure: secondary to covid pneumonia    -On HFNC + NRB 15L   -D-dimer initially elevated on presentation, now wnl  -Remdesivir was discontinued due to positive antibodies   -Continue decadron, add albuterol prn   -procalcitonin wnl  -Encourage use of incentive spirometry  -OOB/PT    #Pneumothorax and pneumomediastinum:  xray chest : pt has Grossly stable trace right apical pneumothorax. Grossly stable mild left apical pneumothorax. Stable small pneumomediastinum. Soft tissue emphysema at the neck bases again noted. Stable patchy bilateral pulmonary infiltrates.  thoracic surgery consulted, no intervention at this time.    ==================ID===================================  Covid pneumonia    -mild leukocytosis  -remains afebrile  -plan as above     ================= Nephro================================  -No issues   -voiding without issue  -monitor lytes, SCr   -monitor I/Os    =================GI====================================  Transaminitis:   likely secondary to covid   - and  on presentation  -Improving- AST 35/  -continue to monitor, f/u hepatitis panel    ================ Heme==================================  Elevated d-dimer: likely secondary to covid    -d-dimer 423 on presentation, now wnl  -continue prophylactic Lovenox 40 mg QD    =================Endocrine===============================  Prediabetes:    -a1c  5.8  -BS controlled  -continue HSS  -monitor FS while on steroids    Abnormal TSH:  -TSH level noted 0.26,   -TSH 0.37 and   -f/u Free T4      ================= Skin/Catheters============================  No rashes. Peripheral IV lines.     - =================Prophylaxis =============================  Lovenox for DVT proph  Protonix for  GI proph    ==================GOC==================================   FULL CODE

## 2021-03-23 NOTE — PROGRESS NOTE ADULT - SUBJECTIVE AND OBJECTIVE BOX
Patient is a 55y old  Male who presents with a chief complaint of SOB (23 Mar 2021 07:01)    pt seen in icu [ x ], reg med floor [  ], bed [ x ], chair at bedside [   ], a+o x3 [ x ], lethargic [  ],    nad [ x ]        Allergies    No Known Allergies        Vitals    T(F): 96.7 (03-23-21 @ 05:00), Max: 98.1 (03-22-21 @ 12:00)  HR: 75 (03-23-21 @ 06:00) (62 - 103)  BP: 120/39 (03-23-21 @ 06:00) (114/56 - 143/82)  RR: 28 (03-23-21 @ 06:00) (20 - 39)  SpO2: 96% (03-23-21 @ 06:00) (92% - 99%)  Wt(kg): --  CAPILLARY BLOOD GLUCOSE      POCT Blood Glucose.: 91 mg/dL (23 Mar 2021 05:21)      Labs                          16.0   13.78 )-----------( 406      ( 23 Mar 2021 06:29 )             47.5       03-23    136  |  103  |  28<H>  ----------------------------<  86  4.6   |  22  |  x     Ca    8.6      23 Mar 2021 06:29  Mg     2.3     03-23    TPro  x   /  Alb  2.4<L>  /  TBili  x   /  DBili  x   /  AST  x   /  ALT  x   /  AlkPhos  x   03-23            .Urine Clean Catch (Midstream)  03-15 @ 00:52   No growth  --  --          Radiology Results      Meds    MEDICATIONS  (STANDING):  ALBUTerol    90 MICROgram(s) HFA Inhaler 2 Puff(s) Inhalation every 6 hours  ascorbic acid 1000 milliGRAM(s) Oral daily  aspirin enteric coated 81 milliGRAM(s) Oral daily  chlorhexidine 2% Cloths 1 Application(s) Topical <User Schedule>  dexAMETHasone     Tablet 6 milliGRAM(s) Oral daily  enoxaparin Injectable 40 milliGRAM(s) SubCutaneous daily  insulin lispro (ADMELOG) corrective regimen sliding scale   SubCutaneous three times a day before meals  pantoprazole    Tablet 40 milliGRAM(s) Oral before breakfast      MEDICATIONS  (PRN):  acetaminophen   Tablet .. 650 milliGRAM(s) Oral every 6 hours PRN Temp greater or equal to 38C (100.4F), Moderate Pain (4 - 6)      Physical Exam    Neuro :  no focal deficits  Respiratory: CTA B/L  CV: RRR, S1S2, no murmurs,   Abdominal: Soft, NT, ND +BS,  Extremities: No edema, + peripheral pulses    ASSESSMENT    Hypoxemia 2nd to covid pna   transaminitis  prediabetes  h/o appendectomy  cholecystectomy        PLAN    contact and airborne isolation  d/c remdesevir given covid ab positive noted   cont dexamethasone  cont asa, vit c,    cont albuterol inhaler   pulm f/u  procalcitonin, D-dimer, crp, ldh, ferritin, lactate noted ,    cont tylenol prn,   cont robitussin prn   O2 sat (86% - 98%) hi miguel ángel  O2 via hi miguel ángel  pt low threshold for intubation   xray 3/19/21 with pneumomediastinum  rept cxr  icu cons noted  f/u acute hepatitis panel  lispro ss  cont current meds           Patient is a 55y old  Male who presents with a chief complaint of SOB (23 Mar 2021 07:01)    pt seen in icu [ x ], reg med floor [  ], bed [ x ], chair at bedside [   ], a+o x3 [ x ], lethargic [  ],    nad [ x ]        Allergies    No Known Allergies        Vitals    T(F): 96.7 (03-23-21 @ 05:00), Max: 98.1 (03-22-21 @ 12:00)  HR: 75 (03-23-21 @ 06:00) (62 - 103)  BP: 120/39 (03-23-21 @ 06:00) (114/56 - 143/82)  RR: 28 (03-23-21 @ 06:00) (20 - 39)  SpO2: 96% (03-23-21 @ 06:00) (92% - 99%)  Wt(kg): --  CAPILLARY BLOOD GLUCOSE      POCT Blood Glucose.: 91 mg/dL (23 Mar 2021 05:21)      Labs                          16.0   13.78 )-----------( 406      ( 23 Mar 2021 06:29 )             47.5       03-23    136  |  103  |  28<H>  ----------------------------<  86  4.6   |  22  |  x     Ca    8.6      23 Mar 2021 06:29  Mg     2.3     03-23    TPro  x   /  Alb  2.4<L>  /  TBili  x   /  DBili  x   /  AST  x   /  ALT  x   /  AlkPhos  x   03-23            .Urine Clean Catch (Midstream)  03-15 @ 00:52   No growth  --  --          Radiology Results      < from: Xray Chest 1 View- PORTABLE-Urgent (Xray Chest 1 View- PORTABLE-Urgent .) (03.22.21 @ 17:18) >  IMPRESSION: New trace right apical pneumothorax. New mild left apical pneumothorax.    Grossly stable small pneumomediastinum.    Soft tissue emphysema at the neck bases bilaterally.    Grossly stable bilateral pulmonary infiltrates.    No evidence for pleural effusion.    The trachea is midline.    Stable cardiac silhouette.    Mild elevation of the right hemidiaphragm.    < end of copied text >      Meds    MEDICATIONS  (STANDING):  ALBUTerol    90 MICROgram(s) HFA Inhaler 2 Puff(s) Inhalation every 6 hours  ascorbic acid 1000 milliGRAM(s) Oral daily  aspirin enteric coated 81 milliGRAM(s) Oral daily  chlorhexidine 2% Cloths 1 Application(s) Topical <User Schedule>  dexAMETHasone     Tablet 6 milliGRAM(s) Oral daily  enoxaparin Injectable 40 milliGRAM(s) SubCutaneous daily  insulin lispro (ADMELOG) corrective regimen sliding scale   SubCutaneous three times a day before meals  pantoprazole    Tablet 40 milliGRAM(s) Oral before breakfast      MEDICATIONS  (PRN):  acetaminophen   Tablet .. 650 milliGRAM(s) Oral every 6 hours PRN Temp greater or equal to 38C (100.4F), Moderate Pain (4 - 6)      Physical Exam    Neuro :  no focal deficits  Respiratory: CTA B/L  CV: RRR, S1S2, no murmurs,   Abdominal: Soft, NT, ND +BS,  Extremities: No edema, + peripheral pulses    ASSESSMENT    Hypoxemia 2nd to covid pna   transaminitis  prediabetes  h/o appendectomy  cholecystectomy        PLAN    contact and airborne isolation  d/c remdesevir given covid ab positive noted   cont dexamethasone  cont asa, vit c,    cont albuterol inhaler   pulm f/u  procalcitonin, D-dimer, crp, ldh, ferritin, lactate noted ,    cont tylenol prn,   cont robitussin prn   O2 sat (92% - 99%) hi miguel ángel  O2 via hi miguel ángel  pt low threshold for intubation   xray 3/19/21 with pneumomediastinum  rept cxr with New trace right apical pneumothorax. New mild left apical pneumothorax. Grossly stable small pneumomediastinum.  Soft tissue emphysema at the neck bases bilaterally. Grossly stable bilateral pulmonary infiltrates noted abov.  thoracic surg cons  f/u acute hepatitis panel  lispro ss  cont current meds  cont mgmt as per icu

## 2021-03-23 NOTE — CONSULT NOTE ADULT - ASSESSMENT
trace right ptx  stable mild left ptx per CXR  pt on hfnc  o2 sat 95%    Recommend observation with serial cxr  please inform thoracic surgery if pt planned for intubation  No thoracic surgery intervention warranted at present  will follow patient with you  Discussed with Dr Lanier

## 2021-03-23 NOTE — PROGRESS NOTE ADULT - SUBJECTIVE AND OBJECTIVE BOX
Pt is awake, alert, lying in bed in ICU. On HFNC. CXR noted - with pneumothorax r/l. Thoracic following.     INTERVAL HPI/OVERNIGHT EVENTS:    VITAL SIGNS:  T(F): 98.2 (03-23-21 @ 12:00)  HR: 79 (03-23-21 @ 12:00)  BP: 119/68 (03-23-21 @ 12:00)  RR: 26 (03-23-21 @ 12:00)  SpO2: 91% (03-23-21 @ 12:00)  Wt(kg): --  I&O's Detail    22 Mar 2021 07:01  -  23 Mar 2021 07:00  --------------------------------------------------------  IN:    Oral Fluid: 640 mL  Total IN: 640 mL    OUT:    Voided (mL): 850 mL  Total OUT: 850 mL    Total NET: -210 mL      23 Mar 2021 07:01  -  23 Mar 2021 13:02  --------------------------------------------------------  IN:    Oral Fluid: 200 mL  Total IN: 200 mL    OUT:    Voided (mL): 150 mL  Total OUT: 150 mL    Total NET: 50 mL    REVIEW OF SYSTEMS:    CONSTITUTIONAL:  No fevers, chills, sweats    HEENT:  Eyes:  No diplopia or blurred vision. ENT:  No earache, sore throat or runny nose.    CARDIOVASCULAR:  No pressure, squeezing, tightness, or heaviness about the chest; no palpitations.    RESPIRATORY:  Per HPI    GASTROINTESTINAL:  No abdominal pain, nausea, vomiting or diarrhea.    GENITOURINARY:  No dysuria, frequency or urgency.    NEUROLOGIC:  No paresthesias, fasciculations, seizures or weakness.    PSYCHIATRIC:  No disorder of thought or mood.      PHYSICAL EXAM:    Constitutional: Well developed and nourished  Eyes:Perrla  ENMT: normal  Neck:supple  Respiratory: good air entry  Cardiovascular: S1 S2 regular  Gastrointestinal: Soft, Non tender  Extremities: No edema  Vascular:normal  Neurological:Awake, alert,Ox3  Musculoskeletal:Normal      MEDICATIONS  (STANDING):  ALBUTerol    90 MICROgram(s) HFA Inhaler 2 Puff(s) Inhalation every 6 hours  ascorbic acid 1000 milliGRAM(s) Oral daily  aspirin enteric coated 81 milliGRAM(s) Oral daily  chlorhexidine 2% Cloths 1 Application(s) Topical <User Schedule>  dexAMETHasone     Tablet 6 milliGRAM(s) Oral daily  enoxaparin Injectable 40 milliGRAM(s) SubCutaneous daily  insulin lispro (ADMELOG) corrective regimen sliding scale   SubCutaneous three times a day before meals  pantoprazole    Tablet 40 milliGRAM(s) Oral before breakfast    MEDICATIONS  (PRN):  acetaminophen   Tablet .. 650 milliGRAM(s) Oral every 6 hours PRN Temp greater or equal to 38C (100.4F), Moderate Pain (4 - 6)      Allergies    No Known Allergies    Intolerances        LABS:                        16.0   13.78 )-----------( 406      ( 23 Mar 2021 06:29 )             47.5     03-23    136  |  103  |  28<H>  ----------------------------<  86  4.6   |  22  |  0.71    Ca    8.6      23 Mar 2021 06:29  Phos  2.7     03-23  Mg     2.3     03-23    TPro  6.9  /  Alb  2.4<L>  /  TBili  1.3<H>  /  DBili  x   /  AST  40  /  ALT  148<H>  /  AlkPhos  85  03-23              CAPILLARY BLOOD GLUCOSE      POCT Blood Glucose.: 125 mg/dL (23 Mar 2021 11:19)  POCT Blood Glucose.: 91 mg/dL (23 Mar 2021 05:21)  POCT Blood Glucose.: 123 mg/dL (22 Mar 2021 17:00)    pro-bnp -- 03-20 @ 04:23     d-dimer 229  03-20 @ 04:23  pro-bnp -- 03-17 @ 10:55     d-dimer 151  03-17 @ 10:55      RADIOLOGY & ADDITIONAL TESTS:    CXR:    < from: Xray Chest 1 View- PORTABLE-Routine (Xray Chest 1 View- PORTABLE-Routine in AM.) (03.23.21 @ 09:26) >  IMPRESSION: Grossly stable trace right apical pneumothorax. Grossly stable mild left apical pneumothorax.    Stable small pneumomediastinum.    Soft tissue emphysema at the neck bases again noted.    Stable patchy bilateral pulmonary infiltrates.    No evidence for pleural effusion.    The trachea is midline.    Stable cardiac silhouette.     is informed.    < end of copied text >    < from: Xray Chest 1 View- PORTABLE-Urgent (Xray Chest 1 View- PORTABLE-Urgent .) (03.22.21 @ 17:18) >  IMPRESSION: New trace right apical pneumothorax. New mild left apical pneumothorax.    Grossly stable small pneumomediastinum.    Soft tissue emphysema at the neck bases bilaterally.    Grossly stable bilateral pulmonary infiltrates.    No evidence for pleural effusion.    The trachea is midline.    Stable cardiac silhouette.    Mild elevation of the right hemidiaphragm.     is informed.    < end of copied text >        Ct scan chest:    ekg;    echo:

## 2021-03-23 NOTE — PROGRESS NOTE ADULT - ASSESSMENT
Assessment and Recommendation:   Problem/Recommendation - 1:  Problem: COVID-19. Recommendation: isolation precautions  oxygen supp - On HFNC   ICU consulted; patient transferred to ICU for further management.   Monitor oxygen sat  Check LFT, LDH, CRP, D-Dimer, Ferritin and procalcitonin  Vit C, D and zinc supp  Montelukast 10 mgs po Qhs  IV steroids.  Management per ICU   Low threshold for intubation   DVT and GI PPX     Problem/Recommendation - 2:  ·  Problem: UTI (urinary tract infection).  Recommendation: check pending culture  Antibiotics.     Problem/Recommendation - 3:  ·  Problem: Chest pain.  Recommendation: likely related to Covid-19 infection.     Problem/Recommendation - 4:  ·  Problem: Transaminitis.  Recommendation: monitor LFTs  GI F/U     Problem/Recommendation - 5:  ·  Problem: Pneumothorax.  Recommendation: New trace right apical pneumothorax. New mild left apical pneumothorax.  Stable small pneumomediastinum  Thoracic sx eval noted.   Continue to f/u CXR   Management per ICU

## 2021-03-24 LAB
ALBUMIN SERPL ELPH-MCNC: 2.6 G/DL — LOW (ref 3.5–5)
ALP SERPL-CCNC: 85 U/L — SIGNIFICANT CHANGE UP (ref 40–120)
ALT FLD-CCNC: 147 U/L DA — HIGH (ref 10–60)
ANION GAP SERPL CALC-SCNC: 10 MMOL/L — SIGNIFICANT CHANGE UP (ref 5–17)
AST SERPL-CCNC: 39 U/L — SIGNIFICANT CHANGE UP (ref 10–40)
BILIRUB SERPL-MCNC: 1.3 MG/DL — HIGH (ref 0.2–1.2)
BUN SERPL-MCNC: 30 MG/DL — HIGH (ref 7–18)
CALCIUM SERPL-MCNC: 9.1 MG/DL — SIGNIFICANT CHANGE UP (ref 8.4–10.5)
CHLORIDE SERPL-SCNC: 105 MMOL/L — SIGNIFICANT CHANGE UP (ref 96–108)
CO2 SERPL-SCNC: 23 MMOL/L — SIGNIFICANT CHANGE UP (ref 22–31)
CREAT SERPL-MCNC: 0.83 MG/DL — SIGNIFICANT CHANGE UP (ref 0.5–1.3)
FERRITIN SERPL-MCNC: 2249 NG/ML — HIGH (ref 30–400)
GLUCOSE BLDC GLUCOMTR-MCNC: 108 MG/DL — HIGH (ref 70–99)
GLUCOSE BLDC GLUCOMTR-MCNC: 135 MG/DL — HIGH (ref 70–99)
GLUCOSE BLDC GLUCOMTR-MCNC: 136 MG/DL — HIGH (ref 70–99)
GLUCOSE BLDC GLUCOMTR-MCNC: 96 MG/DL — SIGNIFICANT CHANGE UP (ref 70–99)
GLUCOSE SERPL-MCNC: 92 MG/DL — SIGNIFICANT CHANGE UP (ref 70–99)
HCT VFR BLD CALC: 47.2 % — SIGNIFICANT CHANGE UP (ref 39–50)
HGB BLD-MCNC: 15.8 G/DL — SIGNIFICANT CHANGE UP (ref 13–17)
MAGNESIUM SERPL-MCNC: 2.3 MG/DL — SIGNIFICANT CHANGE UP (ref 1.6–2.6)
MCHC RBC-ENTMCNC: 31.2 PG — SIGNIFICANT CHANGE UP (ref 27–34)
MCHC RBC-ENTMCNC: 33.5 GM/DL — SIGNIFICANT CHANGE UP (ref 32–36)
MCV RBC AUTO: 93.3 FL — SIGNIFICANT CHANGE UP (ref 80–100)
NRBC # BLD: 0 /100 WBCS — SIGNIFICANT CHANGE UP (ref 0–0)
PHOSPHATE SERPL-MCNC: 2.7 MG/DL — SIGNIFICANT CHANGE UP (ref 2.5–4.5)
PLATELET # BLD AUTO: 450 K/UL — HIGH (ref 150–400)
POTASSIUM SERPL-MCNC: 4.6 MMOL/L — SIGNIFICANT CHANGE UP (ref 3.5–5.3)
POTASSIUM SERPL-SCNC: 4.6 MMOL/L — SIGNIFICANT CHANGE UP (ref 3.5–5.3)
PROCALCITONIN SERPL-MCNC: 0.06 NG/ML — SIGNIFICANT CHANGE UP (ref 0.02–0.1)
PROCALCITONIN SERPL-MCNC: 0.1 NG/ML — SIGNIFICANT CHANGE UP (ref 0.02–0.1)
PROT SERPL-MCNC: 7 G/DL — SIGNIFICANT CHANGE UP (ref 6–8.3)
RBC # BLD: 5.06 M/UL — SIGNIFICANT CHANGE UP (ref 4.2–5.8)
RBC # FLD: 12.8 % — SIGNIFICANT CHANGE UP (ref 10.3–14.5)
SODIUM SERPL-SCNC: 138 MMOL/L — SIGNIFICANT CHANGE UP (ref 135–145)
WBC # BLD: 15.94 K/UL — HIGH (ref 3.8–10.5)
WBC # FLD AUTO: 15.94 K/UL — HIGH (ref 3.8–10.5)

## 2021-03-24 PROCEDURE — 71045 X-RAY EXAM CHEST 1 VIEW: CPT | Mod: 26

## 2021-03-24 PROCEDURE — 71045 X-RAY EXAM CHEST 1 VIEW: CPT | Mod: 26,77

## 2021-03-24 RX ADMIN — ALBUTEROL 2 PUFF(S): 90 AEROSOL, METERED ORAL at 16:46

## 2021-03-24 RX ADMIN — ENOXAPARIN SODIUM 40 MILLIGRAM(S): 100 INJECTION SUBCUTANEOUS at 11:15

## 2021-03-24 RX ADMIN — CHLORHEXIDINE GLUCONATE 1 APPLICATION(S): 213 SOLUTION TOPICAL at 05:17

## 2021-03-24 RX ADMIN — ALBUTEROL 2 PUFF(S): 90 AEROSOL, METERED ORAL at 10:14

## 2021-03-24 RX ADMIN — PANTOPRAZOLE SODIUM 40 MILLIGRAM(S): 20 TABLET, DELAYED RELEASE ORAL at 06:33

## 2021-03-24 RX ADMIN — ALBUTEROL 2 PUFF(S): 90 AEROSOL, METERED ORAL at 21:18

## 2021-03-24 RX ADMIN — Medication 6 MILLIGRAM(S): at 05:18

## 2021-03-24 RX ADMIN — Medication 1000 MILLIGRAM(S): at 11:15

## 2021-03-24 RX ADMIN — Medication 81 MILLIGRAM(S): at 11:15

## 2021-03-24 NOTE — PROGRESS NOTE ADULT - ATTENDING COMMENTS
55 yr old  man , non smoker with  moody 1990s presented 3/14 with x9 days worsening cough, subjective fevers, and SOB, with x2-3 days dysuria and central, non-radiating, constant CP. Admitted to medicine unit  for acute hypoxic respiratory failure secondary to pna from covid-19 infection .     Assessment:  1. Acute hypoxic respiratory failure  2 Covid-19 infection   3. Transaminitis  4. Prediabetes  5. Bilateral pneumothorax    Plan   -Thoracic surgery  no intervention   -Repeat CXR and monitor respiratory status  -isolation : contact and air borne   -HFNC support needed to maintain O2 sat>90%  -monitor biomarkers daily  -dvt/gi prophy  -continue decadron 6 mg /day x 10 days total then taper  -not a candidate for remdesivir dut to covid-19 antibodies and elevated LFT  -not a candidate for Tociluzimab due to elevated LFT  -hemodynamic monitoring   -self prone as tolerated   -Encourage incentive spirometry  -Oral diet  -OOB to chair   -PT evaluation .

## 2021-03-24 NOTE — PROGRESS NOTE ADULT - ASSESSMENT
Assessment and Recommendation:   Problem/Recommendation - 1:  Problem: COVID-19. Recommendation: isolation precautions  oxygen supp - On HFNC   ICU consulted; patient transferred to ICU for further management.   Monitor oxygen sat  Check LFT, LDH, CRP, D-Dimer, Ferritin and procalcitonin  Vit C, D and zinc supp  Montelukast 10 mgs po Qhs  IV steroids.  Management per ICU   Low threshold for intubation   DVT and GI PPX     Problem/Recommendation - 2:  ·  Problem: UTI (urinary tract infection).  Recommendation: check pending culture  Antibiotics.     Problem/Recommendation - 3:  ·  Problem: Chest pain.  Recommendation: likely related to Covid-19 infection.     Problem/Recommendation - 4:  ·  Problem: Transaminitis.  Recommendation: monitor LFTs  GI F/U     Problem/Recommendation - 5:  ·  Problem: Pneumothorax.  Recommendation: New trace right apical pneumothorax. New mild left apical pneumothorax.  Stable small pneumomediastinum  Thoracic sx eval noted.   Continue to f/u CXR - stable 3/24  Management per ICU

## 2021-03-24 NOTE — PROGRESS NOTE ADULT - ASSESSMENT
ASSESSMENT AND PLAN:  55M, no PMH, PSH appy and moody 1990s presented 3/14 with x9 days worsening cough, subjective fevers, and SOB, with x2-3 days dysuria and central, non-radiating, constant CP. Admitted to [facility] for acute hypoxic respiratory failure s/t covid pneumonia, ICU consulted for increasing work of breathing on 15lpm NRM.     1. Acute hypoxic respiratory failure  2. Covid pneumonia  3. Transaminitis  4. Prediabetes  5. Abnormal TSH    =================== Neuro============================  Alert and oriented x 3     ================= Cardiovascular==========================  Hypertension  resolved  - Initially, noted to have multiple elevated BP readings  -Normotensive at this time   -Continue to monitor, may consider low dose acei/arb if hypertensive     ================- Pulm=================================  Acute hypoxic respiratory failure: secondary to covid pneumonia    -On HFNC + NRB 15L   -D-dimer initially elevated on presentation, now wnl  -Remdesivir was discontinued due to positive antibodies   -Continue decadron, add albuterol prn   -procalcitonin wnl  -Encourage use of incentive spirometry  -OOB/PT    #Pneumothorax and pneumomediastinum:  xray chest : pt has Grossly stable trace right apical pneumothorax. Grossly stable mild left apical pneumothorax. Stable small pneumomediastinum. Soft tissue emphysema at the neck bases again noted. Stable patchy bilateral pulmonary infiltrates.  thoracic surgery consulted, no intervention at this time.    ==================ID===================================  Covid pneumonia    -mild leukocytosis  -remains afebrile  -plan as above     ================= Nephro================================  -No issues   -voiding without issue  -monitor lytes, SCr   -monitor I/Os    =================GI====================================  Transaminitis:   likely secondary to covid   - and  on presentation  -Improving- AST 35/  -continue to monitor, f/u hepatitis panel    ================ Heme==================================  Elevated d-dimer: likely secondary to covid    -d-dimer 423 on presentation, now wnl  -continue prophylactic Lovenox 40 mg QD    =================Endocrine===============================  Prediabetes:    -a1c  5.8  -BS controlled  -continue HSS  -monitor FS while on steroids    Abnormal TSH:  -TSH level noted 0.26,   -TSH 0.37 and   -f/u Free T4      ================= Skin/Catheters============================  No rashes. Peripheral IV lines.     - =================Prophylaxis =============================  Lovenox for DVT proph  Protonix for  GI proph    ==================GOC==================================   FULL CODE ASSESSMENT AND PLAN:  55M, no PMH, PSH appy and moody 1990s presented 3/14 with x9 days worsening cough, subjective fevers, and SOB, with x2-3 days dysuria and central, non-radiating, constant CP. Admitted to [facility] for acute hypoxic respiratory failure s/t covid pneumonia, ICU consulted for increasing work of breathing on 15lpm NRM.     1. Acute hypoxic respiratory failure  2. Covid pneumonia  3. Transaminitis  4. Prediabetes  5. Abnormal TSH    =================== Neuro============================  Alert and oriented x 3   on hf/nr , anxious    ================= Cardiovascular==========================  Hypertension  resolved  - Initially, noted to have multiple elevated BP readings  -Continue to monitor, may consider low dose acei/arb if hypertensive     ================- Pulm=================================  Acute hypoxic respiratory failure: secondary to covid pneumonia    -On HFNC + NRB 15L   -D-dimer initially elevated on presentation, now wnl  -Remdesivir was discontinued due to positive antibodies   -Continue decadron, add albuterol prn   -procalcitonin wnl  -Encourage use of incentive spirometry  -OOB/PT    #Pneumothorax and pneumomediastinum:  xray chest : pt has Grossly stable trace right apical pneumothorax. Grossly stable mild left apical pneumothorax. Stable small pneumomediastinum. Soft tissue emphysema at the neck bases again noted. Stable patchy bilateral pulmonary infiltrates.  thoracic surgery consulted, no intervention at this time.  thoracic surgery consulted and recommended observation for now   xray bid    ==================ID===================================  Covid pneumonia  -mild leukocytosis  -remains afebrile  -plan as above     ================= Nephro================================  -No issues   -voiding without issue  -monitor lytes, SCr   -monitor I/Os    =================GI====================================  Transaminitis:   likely secondary to covid   - and  on presentation  -Improving- AST 35/  -continue to monitor, f/u hepatitis panel    ================ Heme==================================  Elevated d-dimer: likely secondary to covid    -d-dimer 423 on presentation, now wnl  -continue prophylactic Lovenox 40 mg QD    =================Endocrine===============================  Prediabetes:    -a1c  5.8  -BS controlled  -continue HSS  -monitor FS while on steroids    Abnormal TSH:  -TSH level noted 0.26,   -TSH 0.37 and   -f/u Free T4      ================= Skin/Catheters============================  No rashes. Peripheral IV lines.     - =================Prophylaxis =============================  Lovenox for DVT proph  Protonix for  GI proph    ==================GOC==================================   FULL CODE

## 2021-03-24 NOTE — PROGRESS NOTE ADULT - SUBJECTIVE AND OBJECTIVE BOX
Patient seen and examined at bedside in ICU    Vital Signs Last 24 Hrs  T(F): 97 (03-24-21 @ 05:00), Max: 98.2 (03-23-21 @ 12:00)  HR: 95 (03-24-21 @ 08:00)  BP: 127/60 (03-24-21 @ 08:00)  RR: 35 (03-24-21 @ 08:00)  SpO2: 94% (03-24-21 @ 08:00)  POCT Blood Glucose.: 96 mg/dL (24 Mar 2021 06:02)    GENERAL: Alert, NAD  CHEST/LUNG: nonrebreather mask in place, tachypneic    I&O's Detail    23 Mar 2021 07:01  -  24 Mar 2021 07:00  --------------------------------------------------------  IN:    Oral Fluid: 780 mL  Total IN: 780 mL    OUT:    Voided (mL): 700 mL  Total OUT: 700 mL    Total NET: 80 mL    LABS:                        15.8   15.94 )-----------( 450      ( 24 Mar 2021 06:32 )             47.2     03-24    138  |  105  |  30<H>  ----------------------------<  92  4.6   |  23  |  0.83    Ca    9.1      24 Mar 2021 06:32  Phos  2.7     03-24  Mg     2.3     03-24    TPro  7.0  /  Alb  2.6<L>  /  TBili  1.3<H>  /  DBili  x   /  AST  39  /  ALT  147<H>  /  AlkPhos  85  03-24    RADIOLOGY & ADDITIONAL STUDIES:  AM CXR done, pending results

## 2021-03-24 NOTE — PROGRESS NOTE ADULT - SUBJECTIVE AND OBJECTIVE BOX
Patient is a 55y old  Male who presents with a chief complaint of SOB (24 Mar 2021 06:51)    pt seen in icu [ x ], reg med floor [  ], bed [ x ], chair at bedside [   ], a+o x3 [ x ], lethargic [  ],    nad [ x ]      Allergies    No Known Allergies        Vitals    T(F): 97 (03-24-21 @ 05:00), Max: 98.2 (03-23-21 @ 12:00)  HR: 83 (03-24-21 @ 06:00) (69 - 115)  BP: 128/60 (03-24-21 @ 06:00) (118/68 - 145/73)  RR: 25 (03-24-21 @ 06:00) (20 - 43)  SpO2: 94% (03-24-21 @ 06:00) (90% - 95%)  Wt(kg): --  CAPILLARY BLOOD GLUCOSE      POCT Blood Glucose.: 96 mg/dL (24 Mar 2021 06:02)      Labs                          15.8   15.94 )-----------( 450      ( 24 Mar 2021 06:32 )             47.2       03-23    136  |  103  |  28<H>  ----------------------------<  86  4.6   |  22  |  0.71    Ca    8.6      23 Mar 2021 06:29  Phos  2.7     03-23  Mg     2.3     03-23    TPro  6.9  /  Alb  2.4<L>  /  TBili  1.3<H>  /  DBili  x   /  AST  40  /  ALT  148<H>  /  AlkPhos  85  03-23            .Urine Clean Catch (Midstream)  03-15 @ 00:52   No growth  --  --          Radiology Results      Meds    MEDICATIONS  (STANDING):  ALBUTerol    90 MICROgram(s) HFA Inhaler 2 Puff(s) Inhalation every 6 hours  ascorbic acid 1000 milliGRAM(s) Oral daily  aspirin enteric coated 81 milliGRAM(s) Oral daily  chlorhexidine 2% Cloths 1 Application(s) Topical <User Schedule>  enoxaparin Injectable 40 milliGRAM(s) SubCutaneous daily  insulin lispro (ADMELOG) corrective regimen sliding scale   SubCutaneous three times a day before meals  pantoprazole    Tablet 40 milliGRAM(s) Oral before breakfast      MEDICATIONS  (PRN):  acetaminophen   Tablet .. 650 milliGRAM(s) Oral every 6 hours PRN Temp greater or equal to 38C (100.4F), Moderate Pain (4 - 6)      Physical Exam    Neuro :  no focal deficits  Respiratory: CTA B/L  CV: RRR, S1S2, no murmurs,   Abdominal: Soft, NT, ND +BS,  Extremities: No edema, + peripheral pulses    ASSESSMENT    Hypoxemia 2nd to covid pna   transaminitis  prediabetes  h/o appendectomy  cholecystectomy        PLAN    contact and airborne isolation  d/c remdesevir given covid ab positive noted   cont dexamethasone  cont asa, vit c,    cont albuterol inhaler   pulm f/u  procalcitonin, D-dimer, crp, ldh, ferritin, lactate noted ,    cont tylenol prn,   cont robitussin prn   O2 sat (92% - 99%) hi miguel ángel  O2 via hi miguel ángel  pt low threshold for intubation   xray 3/19/21 with pneumomediastinum  rept cxr with New trace right apical pneumothorax. New mild left apical pneumothorax. Grossly stable small pneumomediastinum.  Soft tissue emphysema at the neck bases bilaterally. Grossly stable bilateral pulmonary infiltrates noted abov.  thoracic surg cons  f/u acute hepatitis panel  lispro ss  cont current meds  cont mgmt as per icu     Patient is a 55y old  Male who presents with a chief complaint of SOB (24 Mar 2021 06:51)    pt seen in icu [ x ], reg med floor [  ], bed [ x ], chair at bedside [   ], a+o x3 [ x ], lethargic [  ],    nad [ x ]      Allergies    No Known Allergies        Vitals    T(F): 97 (03-24-21 @ 05:00), Max: 98.2 (03-23-21 @ 12:00)  HR: 83 (03-24-21 @ 06:00) (69 - 115)  BP: 128/60 (03-24-21 @ 06:00) (118/68 - 145/73)  RR: 25 (03-24-21 @ 06:00) (20 - 43)  SpO2: 94% (03-24-21 @ 06:00) (90% - 95%)  Wt(kg): --  CAPILLARY BLOOD GLUCOSE      POCT Blood Glucose.: 96 mg/dL (24 Mar 2021 06:02)      Labs                          15.8   15.94 )-----------( 450      ( 24 Mar 2021 06:32 )             47.2       03-23    136  |  103  |  28<H>  ----------------------------<  86  4.6   |  22  |  0.71    Ca    8.6      23 Mar 2021 06:29  Phos  2.7     03-23  Mg     2.3     03-23    TPro  6.9  /  Alb  2.4<L>  /  TBili  1.3<H>  /  DBili  x   /  AST  40  /  ALT  148<H>  /  AlkPhos  85  03-23            .Urine Clean Catch (Midstream)  03-15 @ 00:52   No growth  --  --          Radiology Results      Meds    MEDICATIONS  (STANDING):  ALBUTerol    90 MICROgram(s) HFA Inhaler 2 Puff(s) Inhalation every 6 hours  ascorbic acid 1000 milliGRAM(s) Oral daily  aspirin enteric coated 81 milliGRAM(s) Oral daily  chlorhexidine 2% Cloths 1 Application(s) Topical <User Schedule>  enoxaparin Injectable 40 milliGRAM(s) SubCutaneous daily  insulin lispro (ADMELOG) corrective regimen sliding scale   SubCutaneous three times a day before meals  pantoprazole    Tablet 40 milliGRAM(s) Oral before breakfast      MEDICATIONS  (PRN):  acetaminophen   Tablet .. 650 milliGRAM(s) Oral every 6 hours PRN Temp greater or equal to 38C (100.4F), Moderate Pain (4 - 6)      Physical Exam    Neuro :  no focal deficits  Respiratory: CTA B/L  CV: RRR, S1S2, no murmurs,   Abdominal: Soft, NT, ND +BS,  Extremities: No edema, + peripheral pulses    ASSESSMENT    Hypoxemia 2nd to covid pna   transaminitis  prediabetes  h/o appendectomy  cholecystectomy        PLAN    contact and airborne isolation  d/c remdesevir given covid ab positive noted   cont dexamethasone  cont asa, vit c,    cont albuterol inhaler   pulm f/u  procalcitonin, D-dimer, crp, ldh, ferritin, lactate noted ,    cont tylenol prn,   cont robitussin prn   O2 sat (90% - 95%) hi miguel ángel  O2 via hi miguel ángel  pt low threshold for intubation   xray 3/19/21 with pneumomediastinum  rept cxr with New trace right apical pneumothorax. New mild left apical pneumothorax. Grossly stable small pneumomediastinum.  Soft tissue emphysema at the neck bases bilaterally. Grossly stable bilateral pulmonary infiltrates noted.  thoracic surg f/u   Recommend observation with serial cxr  please inform thoracic surgery if pt planned for intubation  No thoracic surgery intervention warranted at present  f/u acute hepatitis panel  lispro ss  cont current meds  cont mgmt as per icu

## 2021-03-24 NOTE — PROGRESS NOTE ADULT - SUBJECTIVE AND OBJECTIVE BOX
Pt is awake, alert, lying in bed in NAD.     INTERVAL HPI/OVERNIGHT EVENTS:      VITAL SIGNS:  T(F): 98.9 (03-24-21 @ 09:00)  HR: 98 (03-24-21 @ 12:00)  BP: 136/94 (03-24-21 @ 12:00)  RR: 36 (03-24-21 @ 12:00)  SpO2: 95% (03-24-21 @ 12:00)  Wt(kg): --  I&O's Detail    23 Mar 2021 07:01  -  24 Mar 2021 07:00  --------------------------------------------------------  IN:    Oral Fluid: 780 mL  Total IN: 780 mL    OUT:    Voided (mL): 700 mL  Total OUT: 700 mL    Total NET: 80 mL      24 Mar 2021 07:01  -  24 Mar 2021 12:43  --------------------------------------------------------  IN:    Oral Fluid: 300 mL  Total IN: 300 mL    OUT:    Voided (mL): 150 mL  Total OUT: 150 mL    Total NET: 150 mL              REVIEW OF SYSTEMS:    CONSTITUTIONAL:  No fevers, chills, sweats    HEENT:  Eyes:  No diplopia or blurred vision. ENT:  No earache, sore throat or runny nose.    CARDIOVASCULAR:  No pressure, squeezing, tightness, or heaviness about the chest; no palpitations.    RESPIRATORY:  Per HPI    GASTROINTESTINAL:  No abdominal pain, nausea, vomiting or diarrhea.    GENITOURINARY:  No dysuria, frequency or urgency.    NEUROLOGIC:  No paresthesias, fasciculations, seizures or weakness.    PSYCHIATRIC:  No disorder of thought or mood.      PHYSICAL EXAM:    Constitutional: Well developed and nourished  Eyes:Perrla  ENMT: normal  Neck:supple  Respiratory: good air entry  Cardiovascular: S1 S2 regular  Gastrointestinal: Soft, Non tender  Extremities: No edema  Vascular:normal  Neurological:Awake, alert,Ox3  Musculoskeletal:Normal      MEDICATIONS  (STANDING):  ALBUTerol    90 MICROgram(s) HFA Inhaler 2 Puff(s) Inhalation every 6 hours  ascorbic acid 1000 milliGRAM(s) Oral daily  aspirin enteric coated 81 milliGRAM(s) Oral daily  chlorhexidine 2% Cloths 1 Application(s) Topical <User Schedule>  enoxaparin Injectable 40 milliGRAM(s) SubCutaneous daily  insulin lispro (ADMELOG) corrective regimen sliding scale   SubCutaneous three times a day before meals  pantoprazole    Tablet 40 milliGRAM(s) Oral before breakfast    MEDICATIONS  (PRN):  acetaminophen   Tablet .. 650 milliGRAM(s) Oral every 6 hours PRN Temp greater or equal to 38C (100.4F), Moderate Pain (4 - 6)      Allergies    No Known Allergies    Intolerances        LABS:                        15.8   15.94 )-----------( 450      ( 24 Mar 2021 06:32 )             47.2     03-24    138  |  105  |  30<H>  ----------------------------<  92  4.6   |  23  |  0.83    Ca    9.1      24 Mar 2021 06:32  Phos  2.7     03-24  Mg     2.3     03-24    TPro  7.0  /  Alb  2.6<L>  /  TBili  1.3<H>  /  DBili  x   /  AST  39  /  ALT  147<H>  /  AlkPhos  85  03-24              CAPILLARY BLOOD GLUCOSE      POCT Blood Glucose.: 135 mg/dL (24 Mar 2021 10:52)  POCT Blood Glucose.: 96 mg/dL (24 Mar 2021 06:02)  POCT Blood Glucose.: 138 mg/dL (23 Mar 2021 16:45)    pro-bnp -- 03-20 @ 04:23     d-dimer 229  03-20 @ 04:23      RADIOLOGY & ADDITIONAL TESTS:    CXR:  < from: Xray Chest 1 View- PORTABLE-Urgent (Xray Chest 1 View- PORTABLE-Urgent .) (03.24.21 @ 10:06) >  IMPRESSION:    Left pneumothorax unchanged. Bilateral infiltrates unchanged.    < end of copied text >    Ct scan chest:    ekg;    echo:

## 2021-03-24 NOTE — PROGRESS NOTE ADULT - SUBJECTIVE AND OBJECTIVE BOX
INTERVAL HPI/OVERNIGHT EVENTS: ***    PRESSORS: [ ] YES [ ] NO  WHICH:    ANTIBIOTICS:                      Antimicrobial:    Cardiovascular:    Pulmonary:  ALBUTerol    90 MICROgram(s) HFA Inhaler 2 Puff(s) Inhalation every 6 hours    Hematalogic:  aspirin enteric coated 81 milliGRAM(s) Oral daily  enoxaparin Injectable 40 milliGRAM(s) SubCutaneous daily    Other:  acetaminophen   Tablet .. 650 milliGRAM(s) Oral every 6 hours PRN  ascorbic acid 1000 milliGRAM(s) Oral daily  chlorhexidine 2% Cloths 1 Application(s) Topical <User Schedule>  insulin lispro (ADMELOG) corrective regimen sliding scale   SubCutaneous three times a day before meals  pantoprazole    Tablet 40 milliGRAM(s) Oral before breakfast    acetaminophen   Tablet .. 650 milliGRAM(s) Oral every 6 hours PRN  ALBUTerol    90 MICROgram(s) HFA Inhaler 2 Puff(s) Inhalation every 6 hours  ascorbic acid 1000 milliGRAM(s) Oral daily  aspirin enteric coated 81 milliGRAM(s) Oral daily  chlorhexidine 2% Cloths 1 Application(s) Topical <User Schedule>  enoxaparin Injectable 40 milliGRAM(s) SubCutaneous daily  insulin lispro (ADMELOG) corrective regimen sliding scale   SubCutaneous three times a day before meals  pantoprazole    Tablet 40 milliGRAM(s) Oral before breakfast    Drug Dosing Weight  Height (cm): 167.6 (14 Mar 2021 12:03)  Weight (kg): 83.869 (14 Mar 2021 12:03)  BMI (kg/m2): 29.9 (14 Mar 2021 12:03)  BSA (m2): 1.93 (14 Mar 2021 12:03)    CENTRAL LINE: [ ] YES [ ] NO  LOCATION:   DATE INSERTED:  REMOVE: [ ] YES [ ] NO  EXPLAIN:    RIVERA: [ ] YES [ ] NO    DATE INSERTED:  REMOVE:  [ ] YES [ ] NO  EXPLAIN:    A-LINE:  [ ] YES [ ] NO  LOCATION:   DATE INSERTED:  REMOVE:  [ ] YES [ ] NO  EXPLAIN:    PMH -reviewed admission note, no change since admission    ICU Vital Signs Last 24 Hrs  T(C): 36.1 (24 Mar 2021 05:00), Max: 36.8 (23 Mar 2021 12:00)  T(F): 97 (24 Mar 2021 05:00), Max: 98.2 (23 Mar 2021 12:00)  HR: 83 (24 Mar 2021 06:00) (69 - 115)  BP: 128/60 (24 Mar 2021 06:00) (118/68 - 145/73)  BP(mean): 76 (24 Mar 2021 06:00) (67 - 92)  ABP: --  ABP(mean): --  RR: 25 (24 Mar 2021 06:00) (20 - 43)  SpO2: 94% (24 Mar 2021 06:00) (88% - 95%)            03-22 @ 07:01  -  03-23 @ 07:00  --------------------------------------------------------  IN: 640 mL / OUT: 850 mL / NET: -210 mL            PHYSICAL EXAM:    GENERAL: NAD, well-groomed, well-developed  HEAD:  Atraumatic, Normocephalic  EYES: EOMI, PERRLA, conjunctiva and sclera clear  ENMT: No tonsillar erythema, exudates, or enlargement; Moist mucous membranes, Good dentition, No lesions  NECK: Supple, normal appearance, No JVD; Normal thyroid; Trachea midline  NERVOUS SYSTEM:  Alert & Oriented X3, Good concentration; Motor Strength 5/5 B/L upper and lower extremities; DTRs 2+ intact and symmetric  CHEST/LUNG: No chest deformity; Normal percussion bilaterally; No rales, rhonchi, wheezing   HEART: Regular rate and rhythm; No murmurs, rubs, or gallops  ABDOMEN: Soft, Nontender, Nondistended; Bowel sounds present  EXTREMITIES:  2+ Peripheral Pulses, No clubbing, cyanosis, or edema  LYMPH: No lymphadenopathy noted  SKIN: No rashes or lesions; Good capillary refill      LABS:  CBC Full  -  ( 24 Mar 2021 06:32 )  WBC Count : 15.94 K/uL  RBC Count : 5.06 M/uL  Hemoglobin : 15.8 g/dL  Hematocrit : 47.2 %  Platelet Count - Automated : 450 K/uL  Mean Cell Volume : 93.3 fl  Mean Cell Hemoglobin : 31.2 pg  Mean Cell Hemoglobin Concentration : 33.5 gm/dL  Auto Neutrophil # : x  Auto Lymphocyte # : x  Auto Monocyte # : x  Auto Eosinophil # : x  Auto Basophil # : x  Auto Neutrophil % : x  Auto Lymphocyte % : x  Auto Monocyte % : x  Auto Eosinophil % : x  Auto Basophil % : x    03-23    136  |  103  |  28<H>  ----------------------------<  86  4.6   |  22  |  0.71    Ca    8.6      23 Mar 2021 06:29  Phos  2.7     03-23  Mg     2.3     03-23    TPro  6.9  /  Alb  2.4<L>  /  TBili  1.3<H>  /  DBili  x   /  AST  40  /  ALT  148<H>  /  AlkPhos  85  03-23            RADIOLOGY & ADDITIONAL STUDIES REVIEWED:  ***    GOALS OF CARE DISCUSSION WITH PATIENT/FAMILY/PROXY:    CRITICAL CARE TIME SPENT: 35 minutes INTERVAL HPI/OVERNIGHT EVENTS: overnight pt saturation remains in early 90s andlate 80s . Pt remains on HF.       Pulmonary:  ALBUTerol    90 MICROgram(s) HFA Inhaler 2 Puff(s) Inhalation every 6 hours    Hematalogic:  aspirin enteric coated 81 milliGRAM(s) Oral daily  enoxaparin Injectable 40 milliGRAM(s) SubCutaneous daily    Other:  acetaminophen   Tablet .. 650 milliGRAM(s) Oral every 6 hours PRN  ascorbic acid 1000 milliGRAM(s) Oral daily  chlorhexidine 2% Cloths 1 Application(s) Topical <User Schedule>  insulin lispro (ADMELOG) corrective regimen sliding scale   SubCutaneous three times a day before meals  pantoprazole    Tablet 40 milliGRAM(s) Oral before breakfast    acetaminophen   Tablet .. 650 milliGRAM(s) Oral every 6 hours PRN  ALBUTerol    90 MICROgram(s) HFA Inhaler 2 Puff(s) Inhalation every 6 hours  ascorbic acid 1000 milliGRAM(s) Oral daily  aspirin enteric coated 81 milliGRAM(s) Oral daily  chlorhexidine 2% Cloths 1 Application(s) Topical <User Schedule>  enoxaparin Injectable 40 milliGRAM(s) SubCutaneous daily  insulin lispro (ADMELOG) corrective regimen sliding scale   SubCutaneous three times a day before meals  pantoprazole    Tablet 40 milliGRAM(s) Oral before breakfast    Drug Dosing Weight  Height (cm): 167.6 (14 Mar 2021 12:03)  Weight (kg): 83.869 (14 Mar 2021 12:03)  BMI (kg/m2): 29.9 (14 Mar 2021 12:03)  BSA (m2): 1.93 (14 Mar 2021 12:03)    CENTRAL LINE: [ ] YES [ X] NO  LOCATION:   DATE INSERTED:  REMOVE: [ ] YES [ ] NO  EXPLAIN:    RIVERA: [ ] YES [ ] NO    DATE INSERTED:  REMOVE:  [ ] YES [ ] NO  EXPLAIN:    A-LINE:  [ ] YES [X ] NO  LOCATION:   DATE INSERTED:  REMOVE:  [ ] YES [ ] NO  EXPLAIN:    PMH -reviewed admission note, no change since admission    ICU Vital Signs Last 24 Hrs  T(C): 36.1 (24 Mar 2021 05:00), Max: 36.8 (23 Mar 2021 12:00)  T(F): 97 (24 Mar 2021 05:00), Max: 98.2 (23 Mar 2021 12:00)  HR: 83 (24 Mar 2021 06:00) (69 - 115)  BP: 128/60 (24 Mar 2021 06:00) (118/68 - 145/73)  BP(mean): 76 (24 Mar 2021 06:00) (67 - 92)  ABP: --  ABP(mean): --  RR: 25 (24 Mar 2021 06:00) (20 - 43)  SpO2: 94% (24 Mar 2021 06:00) (88% - 95%)            03-22 @ 07:01  -  03-23 @ 07:00  --------------------------------------------------------  IN: 640 mL / OUT: 850 mL / NET: -210 mL            PHYSICAL EXAM:    GENERAL: well-groomed, well-developed, on hf/nr, anxious   HEAD:  Atraumatic, Normocephalic  EYES: EOMI, PERRLA, conjunctiva and sclera clear  ENMT: No tonsillar erythema, exudates, or enlargement; Moist mucous membranes, Good dentition, No lesions  NECK: Supple, normal appearance, No JVD; Normal thyroid; Trachea midline  NERVOUS SYSTEM:  Alert & Oriented X3, Good concentration; Motor Strength 5/5 B/L upper and lower extremities; DTRs 2+ intact and symmetric  CHEST/LUNG: DECREASED BREATH SOUNDS bilaterally, No rales, rhonchi, wheezing. On HFNC 50/100 + NRB 15 L  HEART: s1,s2  No murmurs, rubs, or gallops  ABDOMEN: Soft, Nontender, Nondistended;  EXTREMITIES:  2+ Peripheral Pulses, No clubbing, cyanosis, or edema  LYMPH: No lymphadenopathy noted  SKIN: No rashes or lesions; Good capillary refill      LABS:  CBC Full  -  ( 24 Mar 2021 06:32 )  WBC Count : 15.94 K/uL  RBC Count : 5.06 M/uL  Hemoglobin : 15.8 g/dL  Hematocrit : 47.2 %  Platelet Count - Automated : 450 K/uL  Mean Cell Volume : 93.3 fl  Mean Cell Hemoglobin : 31.2 pg  Mean Cell Hemoglobin Concentration : 33.5 gm/dL  Auto Neutrophil # : x  Auto Lymphocyte # : x  Auto Monocyte # : x  Auto Eosinophil # : x  Auto Basophil # : x  Auto Neutrophil % : x  Auto Lymphocyte % : x  Auto Monocyte % : x  Auto Eosinophil % : x  Auto Basophil % : x    03-23    136  |  103  |  28<H>  ----------------------------<  86  4.6   |  22  |  0.71    Ca    8.6      23 Mar 2021 06:29  Phos  2.7     03-23  Mg     2.3     03-23    TPro  6.9  /  Alb  2.4<L>  /  TBili  1.3<H>  /  DBili  x   /  AST  40  /  ALT  148<H>  /  AlkPhos  85  03-23    GOALS OF CARE DISCUSSION WITH PATIENT/FAMILY/PROXY: full code    CRITICAL CARE TIME SPENT: 35 minutes

## 2021-03-25 LAB
ALBUMIN SERPL ELPH-MCNC: 2.4 G/DL — LOW (ref 3.5–5)
ALP SERPL-CCNC: 83 U/L — SIGNIFICANT CHANGE UP (ref 40–120)
ALT FLD-CCNC: 132 U/L DA — HIGH (ref 10–60)
ANION GAP SERPL CALC-SCNC: 10 MMOL/L — SIGNIFICANT CHANGE UP (ref 5–17)
AST SERPL-CCNC: 33 U/L — SIGNIFICANT CHANGE UP (ref 10–40)
BILIRUB SERPL-MCNC: 1.4 MG/DL — HIGH (ref 0.2–1.2)
BUN SERPL-MCNC: 29 MG/DL — HIGH (ref 7–18)
CALCIUM SERPL-MCNC: 8.4 MG/DL — SIGNIFICANT CHANGE UP (ref 8.4–10.5)
CHLORIDE SERPL-SCNC: 103 MMOL/L — SIGNIFICANT CHANGE UP (ref 96–108)
CO2 SERPL-SCNC: 23 MMOL/L — SIGNIFICANT CHANGE UP (ref 22–31)
CREAT SERPL-MCNC: 0.78 MG/DL — SIGNIFICANT CHANGE UP (ref 0.5–1.3)
CRP SERPL-MCNC: 105 MG/L — HIGH
D DIMER BLD IA.RAPID-MCNC: 242 NG/ML DDU — HIGH
GLUCOSE BLDC GLUCOMTR-MCNC: 139 MG/DL — HIGH (ref 70–99)
GLUCOSE BLDC GLUCOMTR-MCNC: 159 MG/DL — HIGH (ref 70–99)
GLUCOSE BLDC GLUCOMTR-MCNC: 98 MG/DL — SIGNIFICANT CHANGE UP (ref 70–99)
GLUCOSE BLDC GLUCOMTR-MCNC: 99 MG/DL — SIGNIFICANT CHANGE UP (ref 70–99)
GLUCOSE SERPL-MCNC: 84 MG/DL — SIGNIFICANT CHANGE UP (ref 70–99)
HCT VFR BLD CALC: 44.7 % — SIGNIFICANT CHANGE UP (ref 39–50)
HGB BLD-MCNC: 15.1 G/DL — SIGNIFICANT CHANGE UP (ref 13–17)
MAGNESIUM SERPL-MCNC: 2.1 MG/DL — SIGNIFICANT CHANGE UP (ref 1.6–2.6)
MCHC RBC-ENTMCNC: 31.7 PG — SIGNIFICANT CHANGE UP (ref 27–34)
MCHC RBC-ENTMCNC: 33.8 GM/DL — SIGNIFICANT CHANGE UP (ref 32–36)
MCV RBC AUTO: 93.7 FL — SIGNIFICANT CHANGE UP (ref 80–100)
NRBC # BLD: 0 /100 WBCS — SIGNIFICANT CHANGE UP (ref 0–0)
PHOSPHATE SERPL-MCNC: 2.7 MG/DL — SIGNIFICANT CHANGE UP (ref 2.5–4.5)
PLATELET # BLD AUTO: 378 K/UL — SIGNIFICANT CHANGE UP (ref 150–400)
POTASSIUM SERPL-MCNC: 4.6 MMOL/L — SIGNIFICANT CHANGE UP (ref 3.5–5.3)
POTASSIUM SERPL-SCNC: 4.6 MMOL/L — SIGNIFICANT CHANGE UP (ref 3.5–5.3)
PROCALCITONIN SERPL-MCNC: 0.09 NG/ML — SIGNIFICANT CHANGE UP (ref 0.02–0.1)
PROT SERPL-MCNC: 6.6 G/DL — SIGNIFICANT CHANGE UP (ref 6–8.3)
RBC # BLD: 4.77 M/UL — SIGNIFICANT CHANGE UP (ref 4.2–5.8)
RBC # FLD: 12.8 % — SIGNIFICANT CHANGE UP (ref 10.3–14.5)
SODIUM SERPL-SCNC: 136 MMOL/L — SIGNIFICANT CHANGE UP (ref 135–145)
T4 FREE SERPL-MCNC: 1.82 NG/DL — HIGH
WBC # BLD: 15.21 K/UL — HIGH (ref 3.8–10.5)
WBC # FLD AUTO: 15.21 K/UL — HIGH (ref 3.8–10.5)

## 2021-03-25 PROCEDURE — 71045 X-RAY EXAM CHEST 1 VIEW: CPT | Mod: 26

## 2021-03-25 PROCEDURE — 99232 SBSQ HOSP IP/OBS MODERATE 35: CPT

## 2021-03-25 RX ORDER — ALPRAZOLAM 0.25 MG
0.5 TABLET ORAL EVERY 6 HOURS
Refills: 0 | Status: DISCONTINUED | OUTPATIENT
Start: 2021-03-25 | End: 2021-03-25

## 2021-03-25 RX ORDER — MORPHINE SULFATE 50 MG/1
1 CAPSULE, EXTENDED RELEASE ORAL EVERY 4 HOURS
Refills: 0 | Status: DISCONTINUED | OUTPATIENT
Start: 2021-03-25 | End: 2021-03-25

## 2021-03-25 RX ORDER — SENNA PLUS 8.6 MG/1
2 TABLET ORAL AT BEDTIME
Refills: 0 | Status: DISCONTINUED | OUTPATIENT
Start: 2021-03-25 | End: 2021-03-29

## 2021-03-25 RX ORDER — SODIUM CHLORIDE 0.65 %
1 AEROSOL, SPRAY (ML) NASAL
Refills: 0 | Status: DISCONTINUED | OUTPATIENT
Start: 2021-03-25 | End: 2021-03-29

## 2021-03-25 RX ORDER — ALPRAZOLAM 0.25 MG
0.25 TABLET ORAL
Refills: 0 | Status: DISCONTINUED | OUTPATIENT
Start: 2021-03-25 | End: 2021-03-29

## 2021-03-25 RX ORDER — MORPHINE SULFATE 50 MG/1
2 CAPSULE, EXTENDED RELEASE ORAL EVERY 4 HOURS
Refills: 0 | Status: DISCONTINUED | OUTPATIENT
Start: 2021-03-25 | End: 2021-03-30

## 2021-03-25 RX ORDER — POLYETHYLENE GLYCOL 3350 17 G/17G
17 POWDER, FOR SOLUTION ORAL DAILY
Refills: 0 | Status: DISCONTINUED | OUTPATIENT
Start: 2021-03-25 | End: 2021-03-29

## 2021-03-25 RX ADMIN — CHLORHEXIDINE GLUCONATE 1 APPLICATION(S): 213 SOLUTION TOPICAL at 05:14

## 2021-03-25 RX ADMIN — ALBUTEROL 2 PUFF(S): 90 AEROSOL, METERED ORAL at 16:34

## 2021-03-25 RX ADMIN — PANTOPRAZOLE SODIUM 40 MILLIGRAM(S): 20 TABLET, DELAYED RELEASE ORAL at 05:15

## 2021-03-25 RX ADMIN — MORPHINE SULFATE 1 MILLIGRAM(S): 50 CAPSULE, EXTENDED RELEASE ORAL at 12:20

## 2021-03-25 RX ADMIN — ALBUTEROL 2 PUFF(S): 90 AEROSOL, METERED ORAL at 04:13

## 2021-03-25 RX ADMIN — Medication 81 MILLIGRAM(S): at 11:02

## 2021-03-25 RX ADMIN — Medication 1000 MILLIGRAM(S): at 11:02

## 2021-03-25 RX ADMIN — SENNA PLUS 2 TABLET(S): 8.6 TABLET ORAL at 21:25

## 2021-03-25 RX ADMIN — ALBUTEROL 2 PUFF(S): 90 AEROSOL, METERED ORAL at 20:22

## 2021-03-25 RX ADMIN — MORPHINE SULFATE 1 MILLIGRAM(S): 50 CAPSULE, EXTENDED RELEASE ORAL at 12:31

## 2021-03-25 RX ADMIN — MORPHINE SULFATE 2 MILLIGRAM(S): 50 CAPSULE, EXTENDED RELEASE ORAL at 16:00

## 2021-03-25 RX ADMIN — Medication 0.25 MILLIGRAM(S): at 17:23

## 2021-03-25 RX ADMIN — MORPHINE SULFATE 2 MILLIGRAM(S): 50 CAPSULE, EXTENDED RELEASE ORAL at 15:45

## 2021-03-25 RX ADMIN — POLYETHYLENE GLYCOL 3350 17 GRAM(S): 17 POWDER, FOR SOLUTION ORAL at 12:31

## 2021-03-25 RX ADMIN — ALBUTEROL 2 PUFF(S): 90 AEROSOL, METERED ORAL at 09:50

## 2021-03-25 RX ADMIN — Medication 100 MILLIGRAM(S): at 17:50

## 2021-03-25 RX ADMIN — ENOXAPARIN SODIUM 40 MILLIGRAM(S): 100 INJECTION SUBCUTANEOUS at 11:02

## 2021-03-25 NOTE — PROGRESS NOTE ADULT - ATTENDING COMMENTS
55 yr old  man , non smoker with  moody 1990s presented 3/14 with x9 days worsening cough, subjective fevers, and SOB, with x2-3 days dysuria and central, non-radiating, constant CP. Admitted to medicine unit  for acute hypoxic respiratory failure secondary to pna from covid-19 infection .     Assessment:  1. Acute hypoxic respiratory failure  2 Covid-19 infection   3. Transaminitis  4. Prediabetes  5. Bilateral pneumothorax    Plan   -Thoracic surgery  no intervention   -Repeated CXR  is worst and monitor respiratory status  -isolation : contact and air borne   -HFNC support needed to maintain O2 sat>90%  -monitor biomarkers daily  -trial of semi- pulse steroid .. solumedrol 250 bid x 3 days   -dvt/gi prophy  -not a candidate for remdesivir due to covid-19 antibodies and elevated LFT  -not a candidate for Tociluzimab due to elevated LFT  -hemodynamic monitoring   -self prone as tolerated   -Oral diet  -OOB to chair   -PT evaluation .

## 2021-03-25 NOTE — PROGRESS NOTE ADULT - ASSESSMENT
ASSESSMENT AND PLAN:  55M, no PMH, PSH appy and moody 1990s presented 3/14 with x9 days worsening cough, subjective fevers, and SOB, with x2-3 days dysuria and central, non-radiating, constant CP. Admitted to [facility] for acute hypoxic respiratory failure s/t covid pneumonia, ICU consulted for increasing work of breathing on 15lpm NRM.     1. Acute hypoxic respiratory failure  2. Covid pneumonia  3. Transaminitis  4. Prediabetes  5. Abnormal TSH    =================== Neuro============================  Alert and oriented x 3   on hf/nr , anxious    ================= Cardiovascular==========================  Hypertension  resolved  - Initially, noted to have multiple elevated BP readings  -Continue to monitor, may consider low dose acei/arb if hypertensive     ================- Pulm=================================  Acute hypoxic respiratory failure: secondary to covid pneumonia    -On HFNC + NRB 15L   -D-dimer initially elevated on presentation, now wnl  -Remdesivir was discontinued due to positive antibodies   -Continue decadron, add albuterol prn   -procalcitonin wnl  -Encourage use of incentive spirometry  -OOB/PT    #Pneumothorax and pneumomediastinum:  xray chest : pt has Grossly stable trace right apical pneumothorax. Grossly stable mild left apical pneumothorax. Stable small pneumomediastinum. Soft tissue emphysema at the neck bases again noted. Stable patchy bilateral pulmonary infiltrates.  thoracic surgery consulted, no intervention at this time.  thoracic surgery consulted and recommended observation for now   xray bid    ==================ID===================================  Covid pneumonia  -mild leukocytosis  -remains afebrile  -plan as above     ================= Nephro================================  -No issues   -voiding without issue  -monitor lytes, SCr   -monitor I/Os    =================GI====================================  Transaminitis:   likely secondary to covid   - and  on presentation  -Improving- AST 35/  -continue to monitor, f/u hepatitis panel    ================ Heme==================================  Elevated d-dimer: likely secondary to covid    -d-dimer 423 on presentation, now wnl  -continue prophylactic Lovenox 40 mg QD    =================Endocrine===============================  Prediabetes:    -a1c  5.8  -BS controlled  -continue HSS  -monitor FS while on steroids    Abnormal TSH:  -TSH level noted 0.26,   -TSH 0.37 and   -f/u Free T4      ================= Skin/Catheters============================  No rashes. Peripheral IV lines.     - =================Prophylaxis =============================  Lovenox for DVT proph  Protonix for  GI proph    ==================GOC==================================   FULL CODE ASSESSMENT AND PLAN:  55M, no PMH, PSH appy and moody 1990s presented 3/14 with x9 days worsening cough, subjective fevers, and SOB, with x2-3 days dysuria and central, non-radiating, constant CP. Admitted to [facility] for acute hypoxic respiratory failure s/t covid pneumonia, ICU consulted for increasing work of breathing on 15lpm NRM.     1. Acute hypoxic respiratory failure  2. Covid pneumonia  3. Transaminitis  4. Prediabetes  5. Abnormal TSH    =================== Neuro============================  Alert and oriented x 3   on hf/nr , anxious      ================= Cardiovascular==========================  Hypertension  resolved  - Initially, noted to have multiple elevated BP readings  -Continue to monitor, may consider low dose acei/arb if hypertensive     ================- Pulm=================================  Acute hypoxic respiratory failure: secondary to covid pneumonia    -On HFNC + NRB 15L   -D-dimer initially elevated on presentation, now wnl  -Remdesivir was discontinued due to positive antibodies   -Continue decadron, add albuterol prn   -procalcitonin wnl, d-dimer in 200s  -started pulse steroids for 3 days - 250mg solumedrol bid  -Encourage use of incentive spirometry  -OOB/PT  -xanax for anxiousness, morphine 1mg q 4 prn for air hunger    #Pneumothorax and pneumomediastinum:  xray chest : pt has Grossly stable trace right apical pneumothorax. Grossly stable mild left apical pneumothorax. Stable small pneumomediastinum. Soft tissue emphysema at the neck bases again noted. Stable patchy bilateral pulmonary infiltrates.  thoracic surgery consulted recommended no intervention at this time, just observation for now  xray bid    ==================ID===================================  Covid pneumonia  -mild leukocytosis  -remains afebrile  -plan as above     ================= Nephro================================  -No issues   -voiding without issue  -monitor lytes, SCr   -monitor I/Os    =================GI====================================  Transaminitis:   likely secondary to covid   - and  on presentation  -Improving-  -continue to monitor,  -  hepatitis panel -ve    ================ Heme==================================  Elevated d-dimer: likely secondary to covid  -d-dimer 423 on presentation, now wnl  -continue prophylactic Lovenox 40 mg QD    =================Endocrine===============================  Prediabetes:    -a1c  5.8  -BS controlled  -continue HSS  -monitor FS while on steroids    Abnormal TSH:  -TSH level noted 0.26,   -TSH 0.37 and   -f/u Free T4      ================= Skin/Catheters============================  No rashes. Peripheral IV lines.     - =================Prophylaxis =============================  Lovenox for DVT proph  Protonix for  GI proph    ==================GOC==================================   FULL CODE

## 2021-03-25 NOTE — PROGRESS NOTE ADULT - SUBJECTIVE AND OBJECTIVE BOX
INTERVAL HPI/OVERNIGHT EVENTS:  Pt sitting in chair on O2 support.  Denies SOB.  PM CXR did not visualize pneumo.    MEDICATIONS  (STANDING):  ALBUTerol    90 MICROgram(s) HFA Inhaler 2 Puff(s) Inhalation every 6 hours  ascorbic acid 1000 milliGRAM(s) Oral daily  aspirin enteric coated 81 milliGRAM(s) Oral daily  chlorhexidine 2% Cloths 1 Application(s) Topical <User Schedule>  enoxaparin Injectable 40 milliGRAM(s) SubCutaneous daily  insulin lispro (ADMELOG) corrective regimen sliding scale   SubCutaneous three times a day before meals  pantoprazole    Tablet 40 milliGRAM(s) Oral before breakfast    MEDICATIONS  (PRN):  acetaminophen   Tablet .. 650 milliGRAM(s) Oral every 6 hours PRN Temp greater or equal to 38C (100.4F), Moderate Pain (4 - 6)    Vital Signs Last 24 Hrs  T(C): 36.1 (25 Mar 2021 04:00), Max: 36.7 (24 Mar 2021 13:00)  T(F): 97 (25 Mar 2021 04:00), Max: 98.1 (24 Mar 2021 13:00)  HR: 114 (25 Mar 2021 10:00) (69 - 116)  BP: 148/72 (25 Mar 2021 10:00) (117/67 - 148/72)  BP(mean): 85 (25 Mar 2021 10:00) (67 - 105)  RR: 36 (25 Mar 2021 08:00) (19 - 39)  SpO2: 89% (25 Mar 2021 10:00) (86% - 97%)    Physical:  General: A&Ox3. NAD.  Chest: Symmetrical rise of chest b/l with use of accessory muscles.    < from: Xray Chest 1 View- PORTABLE-Urgent (Xray Chest 1 View- PORTABLE-Urgent .) (03.24.21 @ 17:24) >   FINDINGS:     No evidence of pneumothorax can be appreciated on the available image. This may be related to patient positioning. Evidence of pneumomediastinum and subcutaneous emphysema in the lower neck is again noted. There are patchy bibasilar infiltrates and elevated right hemidiaphragm.    < end of copied text >

## 2021-03-25 NOTE — PROGRESS NOTE ADULT - SUBJECTIVE AND OBJECTIVE BOX
Patient is a 55y old  Male who presents with a chief complaint of SOB (24 Mar 2021 12:42)    pt seen in icu [ x ], reg med floor [  ], bed [ x ], chair at bedside [   ], a+o x3 [ x ], lethargic [  ],    nad [ x ]      Allergies    No Known Allergies        Vitals    T(F): 96.3 (03-24-21 @ 23:00), Max: 98.9 (03-24-21 @ 09:00)  HR: 80 (03-25-21 @ 04:47) (71 - 100)  BP: 126/57 (03-25-21 @ 00:00) (117/67 - 142/68)  RR: 23 (03-25-21 @ 04:47) (21 - 39)  SpO2: 93% (03-25-21 @ 04:47) (90% - 98%)  Wt(kg): --  CAPILLARY BLOOD GLUCOSE      POCT Blood Glucose.: 98 mg/dL (25 Mar 2021 05:32)      Labs                          15.1   15.21 )-----------( 378      ( 25 Mar 2021 05:41 )             44.7       03-25    136  |  103  |  29<H>  ----------------------------<  84  4.6   |  23  |  0.78    Ca    8.4      25 Mar 2021 05:41  Phos  2.7     03-25  Mg     2.1     03-25    TPro  6.6  /  Alb  2.4<L>  /  TBili  1.4<H>  /  DBili  x   /  AST  33  /  ALT  132<H>  /  AlkPhos  83  03-25            .Urine Clean Catch (Midstream)  03-15 @ 00:52   No growth  --  --          Radiology Results      Meds    MEDICATIONS  (STANDING):  ALBUTerol    90 MICROgram(s) HFA Inhaler 2 Puff(s) Inhalation every 6 hours  ascorbic acid 1000 milliGRAM(s) Oral daily  aspirin enteric coated 81 milliGRAM(s) Oral daily  chlorhexidine 2% Cloths 1 Application(s) Topical <User Schedule>  enoxaparin Injectable 40 milliGRAM(s) SubCutaneous daily  insulin lispro (ADMELOG) corrective regimen sliding scale   SubCutaneous three times a day before meals  pantoprazole    Tablet 40 milliGRAM(s) Oral before breakfast      MEDICATIONS  (PRN):  acetaminophen   Tablet .. 650 milliGRAM(s) Oral every 6 hours PRN Temp greater or equal to 38C (100.4F), Moderate Pain (4 - 6)      Physical Exam    Neuro :  no focal deficits  Respiratory: CTA B/L  CV: RRR, S1S2, no murmurs,   Abdominal: Soft, NT, ND +BS,  Extremities: No edema, + peripheral pulses    ASSESSMENT    Hypoxemia 2nd to covid pna   transaminitis  prediabetes  h/o appendectomy  cholecystectomy        PLAN    contact and airborne isolation  d/c remdesevir given covid ab positive noted   cont dexamethasone  cont asa, vit c,    cont albuterol inhaler   pulm f/u  procalcitonin, D-dimer, crp, ldh, ferritin, lactate noted ,    cont tylenol prn,   cont robitussin prn   O2 sat (90% - 95%) hi miguel ángel  O2 via hi miguel ángel  pt low threshold for intubation   xray 3/19/21 with pneumomediastinum  rept cxr with New trace right apical pneumothorax. New mild left apical pneumothorax. Grossly stable small pneumomediastinum.  Soft tissue emphysema at the neck bases bilaterally. Grossly stable bilateral pulmonary infiltrates noted.  thoracic surg f/u   Recommend observation with serial cxr  please inform thoracic surgery if pt planned for intubation  No thoracic surgery intervention warranted at present  f/u acute hepatitis panel  lispro ss  cont current meds  cont mgmt as per icu       Patient is a 55y old  Male who presents with a chief complaint of SOB (24 Mar 2021 12:42)    pt seen in icu [ x ], reg med floor [  ], bed [ x ], chair at bedside [   ], a+o x3 [ x ], lethargic [  ],    nad [ x ]      Allergies    No Known Allergies        Vitals    T(F): 96.3 (03-24-21 @ 23:00), Max: 98.9 (03-24-21 @ 09:00)  HR: 80 (03-25-21 @ 04:47) (71 - 100)  BP: 126/57 (03-25-21 @ 00:00) (117/67 - 142/68)  RR: 23 (03-25-21 @ 04:47) (21 - 39)  SpO2: 93% (03-25-21 @ 04:47) (90% - 98%)  Wt(kg): --  CAPILLARY BLOOD GLUCOSE      POCT Blood Glucose.: 98 mg/dL (25 Mar 2021 05:32)      Labs                          15.1   15.21 )-----------( 378      ( 25 Mar 2021 05:41 )             44.7       03-25    136  |  103  |  29<H>  ----------------------------<  84  4.6   |  23  |  0.78    Ca    8.4      25 Mar 2021 05:41  Phos  2.7     03-25  Mg     2.1     03-25    TPro  6.6  /  Alb  2.4<L>  /  TBili  1.4<H>  /  DBili  x   /  AST  33  /  ALT  132<H>  /  AlkPhos  83  03-25            .Urine Clean Catch (Midstream)  03-15 @ 00:52   No growth  --  --          Radiology Results      < from: Xray Chest 1 View- PORTABLE-Urgent (Xray Chest 1 View- PORTABLE-Urgent .) (03.24.21 @ 17:24) >   No evidence of pneumothorax can be appreciated on the available image. This may be related to patient positioning. Evidence of pneumomediastinum and subcutaneous emphysema in the lower neck is again noted. There are patchy bibasilar infiltrates and elevated right hemidiaphragm.      < end of copied text >    Meds    MEDICATIONS  (STANDING):  ALBUTerol    90 MICROgram(s) HFA Inhaler 2 Puff(s) Inhalation every 6 hours  ascorbic acid 1000 milliGRAM(s) Oral daily  aspirin enteric coated 81 milliGRAM(s) Oral daily  chlorhexidine 2% Cloths 1 Application(s) Topical <User Schedule>  enoxaparin Injectable 40 milliGRAM(s) SubCutaneous daily  insulin lispro (ADMELOG) corrective regimen sliding scale   SubCutaneous three times a day before meals  pantoprazole    Tablet 40 milliGRAM(s) Oral before breakfast      MEDICATIONS  (PRN):  acetaminophen   Tablet .. 650 milliGRAM(s) Oral every 6 hours PRN Temp greater or equal to 38C (100.4F), Moderate Pain (4 - 6)      Physical Exam    Neuro :  no focal deficits  Respiratory: CTA B/L  CV: RRR, S1S2, no murmurs,   Abdominal: Soft, NT, ND +BS,  Extremities: No edema, + peripheral pulses    ASSESSMENT    Hypoxemia 2nd to covid pna   transaminitis  prediabetes  h/o appendectomy  cholecystectomy        PLAN    contact and airborne isolation  d/c remdesevir given covid ab positive noted   cont dexamethasone  cont asa, vit c,    cont albuterol inhaler   pulm f/u  procalcitonin, D-dimer, crp, ldh, ferritin, lactate noted ,    cont tylenol prn,   cont robitussin prn   O2 sat (90% - 98%) hi miguel ángel  O2 via hi miguel ángel  pt low threshold for intubation   xray 3/19/21 with pneumomediastinum  rept cxr with New trace right apical pneumothorax. New mild left apical pneumothorax. Grossly stable small pneumomediastinum.  Soft tissue emphysema at the neck bases bilaterally. Grossly stable bilateral pulmonary infiltrates noted.   cxr 2/24 with No evidence of pneumothorax can be appreciated on the available image. This may be related to patient positioning. Evidence of pneumomediastinum and subcutaneous emphysema in the lower neck is again noted. There are patchy bibasilar infiltrates and elevated right hemidiaphragm noted above.  thoracic surg f/u   f/u daily cxr  No thoracic intervention unless pneumothorax worsens and/or pt requires intubation  f/u acute hepatitis panel  lispro ss  cont current meds  cont mgmt as per icu

## 2021-03-25 NOTE — PROGRESS NOTE ADULT - SUBJECTIVE AND OBJECTIVE BOX
Pt is awake, alert, lying in bed. In ICU. On HFNC.     INTERVAL HPI/OVERNIGHT EVENTS:      VITAL SIGNS:  T(F): 97 (03-25-21 @ 04:00)  HR: 139 (03-25-21 @ 11:00)  BP: 134/47 (03-25-21 @ 11:00)  RR: 44 (03-25-21 @ 11:00)  SpO2: 95% (03-25-21 @ 11:00)  Wt(kg): --  I&O's Detail    24 Mar 2021 07:01  -  25 Mar 2021 07:00  --------------------------------------------------------  IN:    Oral Fluid: 900 mL  Total IN: 900 mL    OUT:    Estimated Blood Loss (mL): 0 mL    Voided (mL): 850 mL  Total OUT: 850 mL    Total NET: 50 mL      25 Mar 2021 07:01  -  25 Mar 2021 12:16  --------------------------------------------------------  IN:    Oral Fluid: 360 mL  Total IN: 360 mL    OUT:    Voided (mL): 50 mL  Total OUT: 50 mL    Total NET: 310 mL              REVIEW OF SYSTEMS:    CONSTITUTIONAL:  No fevers, chills, sweats    HEENT:  Eyes:  No diplopia or blurred vision. ENT:  No earache, sore throat or runny nose.    CARDIOVASCULAR:  No pressure, squeezing, tightness, or heaviness about the chest; no palpitations.    RESPIRATORY:  Per HPI    GASTROINTESTINAL:  No abdominal pain, nausea, vomiting or diarrhea.    GENITOURINARY:  No dysuria, frequency or urgency.    NEUROLOGIC:  No paresthesias, fasciculations, seizures or weakness.    PSYCHIATRIC:  No disorder of thought or mood.      PHYSICAL EXAM:    Constitutional: Well developed and nourished  Eyes:Perrla  ENMT: normal  Neck:supple  Respiratory: good air entry  Cardiovascular: S1 S2 regular  Gastrointestinal: Soft, Non tender  Extremities: No edema  Vascular:normal  Neurological:Awake, alert,Ox3  Musculoskeletal:Normal      MEDICATIONS  (STANDING):  ALBUTerol    90 MICROgram(s) HFA Inhaler 2 Puff(s) Inhalation every 6 hours  ALPRAZolam 0.25 milliGRAM(s) Oral two times a day  ascorbic acid 1000 milliGRAM(s) Oral daily  aspirin enteric coated 81 milliGRAM(s) Oral daily  chlorhexidine 2% Cloths 1 Application(s) Topical <User Schedule>  enoxaparin Injectable 40 milliGRAM(s) SubCutaneous daily  insulin lispro (ADMELOG) corrective regimen sliding scale   SubCutaneous three times a day before meals  methylPREDNISolone sodium succinate IVPB 250 milliGRAM(s) IV Intermittent two times a day  pantoprazole    Tablet 40 milliGRAM(s) Oral before breakfast  polyethylene glycol 3350 17 Gram(s) Oral daily    MEDICATIONS  (PRN):  acetaminophen   Tablet .. 650 milliGRAM(s) Oral every 6 hours PRN Temp greater or equal to 38C (100.4F), Moderate Pain (4 - 6)  morphine  - Injectable 1 milliGRAM(s) IV Push every 4 hours PRN Respiratory distress      Allergies    No Known Allergies    Intolerances        LABS:                        15.1   15.21 )-----------( 378      ( 25 Mar 2021 05:41 )             44.7     03-25    136  |  103  |  29<H>  ----------------------------<  84  4.6   |  23  |  0.78    Ca    8.4      25 Mar 2021 05:41  Phos  2.7     03-25  Mg     2.1     03-25    TPro  6.6  /  Alb  2.4<L>  /  TBili  1.4<H>  /  DBili  x   /  AST  33  /  ALT  132<H>  /  AlkPhos  83  03-25              CAPILLARY BLOOD GLUCOSE      POCT Blood Glucose.: 99 mg/dL (25 Mar 2021 10:01)  POCT Blood Glucose.: 98 mg/dL (25 Mar 2021 05:32)  POCT Blood Glucose.: 108 mg/dL (24 Mar 2021 22:56)  POCT Blood Glucose.: 136 mg/dL (24 Mar 2021 16:47)    pro-bnp -- 03-25 @ 05:41     d-dimer 242  03-25 @ 05:41  pro-bnp -- 03-20 @ 04:23     d-dimer 229  03-20 @ 04:23      RADIOLOGY & ADDITIONAL TESTS:    CXR:     FINDINGS:  No evidence of pneumothorax can be appreciated on the available image. This may be related to patient positioning. Evidence of pneumomediastinum and subcutaneous emphysema in the lower neck is again noted. There are patchy bibasilar infiltrates and elevated right hemidiaphragm.      IMPRESSION:    As above.    Ct scan chest:    ekg;    echo:

## 2021-03-25 NOTE — PROGRESS NOTE ADULT - SUBJECTIVE AND OBJECTIVE BOX
INTERVAL HPI/OVERNIGHT EVENTS: ***    PRESSORS: [ ] YES [ ] NO  WHICH:    ANTIBIOTICS:                      Antimicrobial:    Cardiovascular:    Pulmonary:  ALBUTerol    90 MICROgram(s) HFA Inhaler 2 Puff(s) Inhalation every 6 hours    Hematalogic:  aspirin enteric coated 81 milliGRAM(s) Oral daily  enoxaparin Injectable 40 milliGRAM(s) SubCutaneous daily    Other:  acetaminophen   Tablet .. 650 milliGRAM(s) Oral every 6 hours PRN  ascorbic acid 1000 milliGRAM(s) Oral daily  chlorhexidine 2% Cloths 1 Application(s) Topical <User Schedule>  insulin lispro (ADMELOG) corrective regimen sliding scale   SubCutaneous three times a day before meals  pantoprazole    Tablet 40 milliGRAM(s) Oral before breakfast    acetaminophen   Tablet .. 650 milliGRAM(s) Oral every 6 hours PRN  ALBUTerol    90 MICROgram(s) HFA Inhaler 2 Puff(s) Inhalation every 6 hours  ascorbic acid 1000 milliGRAM(s) Oral daily  aspirin enteric coated 81 milliGRAM(s) Oral daily  chlorhexidine 2% Cloths 1 Application(s) Topical <User Schedule>  enoxaparin Injectable 40 milliGRAM(s) SubCutaneous daily  insulin lispro (ADMELOG) corrective regimen sliding scale   SubCutaneous three times a day before meals  pantoprazole    Tablet 40 milliGRAM(s) Oral before breakfast    Drug Dosing Weight  Height (cm): 167.6 (14 Mar 2021 12:03)  Weight (kg): 83.869 (14 Mar 2021 12:03)  BMI (kg/m2): 29.9 (14 Mar 2021 12:03)  BSA (m2): 1.93 (14 Mar 2021 12:03)    CENTRAL LINE: [ ] YES [ ] NO  LOCATION:   DATE INSERTED:  REMOVE: [ ] YES [ ] NO  EXPLAIN:    RIVERA: [ ] YES [ ] NO    DATE INSERTED:  REMOVE:  [ ] YES [ ] NO  EXPLAIN:    A-LINE:  [ ] YES [ ] NO  LOCATION:   DATE INSERTED:  REMOVE:  [ ] YES [ ] NO  EXPLAIN:    PMH -reviewed admission note, no change since admission    ICU Vital Signs Last 24 Hrs  T(C): 36.1 (25 Mar 2021 04:00), Max: 36.7 (24 Mar 2021 13:00)  T(F): 97 (25 Mar 2021 04:00), Max: 98.1 (24 Mar 2021 13:00)  HR: 76 (25 Mar 2021 06:00) (69 - 100)  BP: 126/60 (25 Mar 2021 06:00) (117/67 - 142/68)  BP(mean): 75 (25 Mar 2021 06:00) (67 - 105)  ABP: --  ABP(mean): --  RR: 31 (25 Mar 2021 06:00) (19 - 39)  SpO2: 91% (25 Mar 2021 06:00) (90% - 98%)            03-24 @ 07:01  -  03-25 @ 07:00  --------------------------------------------------------  IN: 900 mL / OUT: 850 mL / NET: 50 mL            PHYSICAL EXAM:    GENERAL: NAD, well-groomed, well-developed  HEAD:  Atraumatic, Normocephalic  EYES: EOMI, PERRLA, conjunctiva and sclera clear  ENMT: No tonsillar erythema, exudates, or enlargement; Moist mucous membranes, Good dentition, No lesions  NECK: Supple, normal appearance, No JVD; Normal thyroid; Trachea midline  NERVOUS SYSTEM:  Alert & Oriented X3, Good concentration; Motor Strength 5/5 B/L upper and lower extremities; DTRs 2+ intact and symmetric  CHEST/LUNG: No chest deformity; Normal percussion bilaterally; No rales, rhonchi, wheezing   HEART: Regular rate and rhythm; No murmurs, rubs, or gallops  ABDOMEN: Soft, Nontender, Nondistended; Bowel sounds present  EXTREMITIES:  2+ Peripheral Pulses, No clubbing, cyanosis, or edema  LYMPH: No lymphadenopathy noted  SKIN: No rashes or lesions; Good capillary refill      LABS:  CBC Full  -  ( 25 Mar 2021 05:41 )  WBC Count : 15.21 K/uL  RBC Count : 4.77 M/uL  Hemoglobin : 15.1 g/dL  Hematocrit : 44.7 %  Platelet Count - Automated : 378 K/uL  Mean Cell Volume : 93.7 fl  Mean Cell Hemoglobin : 31.7 pg  Mean Cell Hemoglobin Concentration : 33.8 gm/dL  Auto Neutrophil # : x  Auto Lymphocyte # : x  Auto Monocyte # : x  Auto Eosinophil # : x  Auto Basophil # : x  Auto Neutrophil % : x  Auto Lymphocyte % : x  Auto Monocyte % : x  Auto Eosinophil % : x  Auto Basophil % : x    03-25    136  |  103  |  29<H>  ----------------------------<  84  4.6   |  23  |  0.78    Ca    8.4      25 Mar 2021 05:41  Phos  2.7     03-25  Mg     2.1     03-25    TPro  6.6  /  Alb  2.4<L>  /  TBili  1.4<H>  /  DBili  x   /  AST  33  /  ALT  132<H>  /  AlkPhos  83  03-25            RADIOLOGY & ADDITIONAL STUDIES REVIEWED:  ***    GOALS OF CARE DISCUSSION WITH PATIENT/FAMILY/PROXY:    CRITICAL CARE TIME SPENT: 35 minutes INTERVAL HPI/OVERNIGHT EVENTS: no acute event overnight        Pulmonary:  ALBUTerol    90 MICROgram(s) HFA Inhaler 2 Puff(s) Inhalation every 6 hours    Hematalogic:  aspirin enteric coated 81 milliGRAM(s) Oral daily  enoxaparin Injectable 40 milliGRAM(s) SubCutaneous daily    Other:  acetaminophen   Tablet .. 650 milliGRAM(s) Oral every 6 hours PRN  ascorbic acid 1000 milliGRAM(s) Oral daily  chlorhexidine 2% Cloths 1 Application(s) Topical <User Schedule>  insulin lispro (ADMELOG) corrective regimen sliding scale   SubCutaneous three times a day before meals  pantoprazole    Tablet 40 milliGRAM(s) Oral before breakfast    acetaminophen   Tablet .. 650 milliGRAM(s) Oral every 6 hours PRN  ALBUTerol    90 MICROgram(s) HFA Inhaler 2 Puff(s) Inhalation every 6 hours  ascorbic acid 1000 milliGRAM(s) Oral daily  aspirin enteric coated 81 milliGRAM(s) Oral daily  chlorhexidine 2% Cloths 1 Application(s) Topical <User Schedule>  enoxaparin Injectable 40 milliGRAM(s) SubCutaneous daily  insulin lispro (ADMELOG) corrective regimen sliding scale   SubCutaneous three times a day before meals  pantoprazole    Tablet 40 milliGRAM(s) Oral before breakfast    Drug Dosing Weight  Height (cm): 167.6 (14 Mar 2021 12:03)  Weight (kg): 83.869 (14 Mar 2021 12:03)  BMI (kg/m2): 29.9 (14 Mar 2021 12:03)  BSA (m2): 1.93 (14 Mar 2021 12:03)    CENTRAL LINE: [ ] YES [ X] NO  LOCATION:   DATE INSERTED:  REMOVE: [ ] YES [ ] NO  EXPLAIN:    RIEVRA: [ ] YES [x ] NO    DATE INSERTED:  REMOVE:  [ ] YES [ ] NO  EXPLAIN:    A-LINE:  [ ] YES [X ] NO  LOCATION:   DATE INSERTED:  REMOVE:  [ ] YES [ ] NO  EXPLAIN:      PMH -reviewed admission note, no change since admission    ICU Vital Signs Last 24 Hrs  T(C): 36.1 (25 Mar 2021 04:00), Max: 36.7 (24 Mar 2021 13:00)  T(F): 97 (25 Mar 2021 04:00), Max: 98.1 (24 Mar 2021 13:00)  HR: 76 (25 Mar 2021 06:00) (69 - 100)  BP: 126/60 (25 Mar 2021 06:00) (117/67 - 142/68)  BP(mean): 75 (25 Mar 2021 06:00) (67 - 105)  ABP: --  ABP(mean): --  RR: 31 (25 Mar 2021 06:00) (19 - 39)  SpO2: 91% (25 Mar 2021 06:00) (90% - 98%)            03-24 @ 07:01  -  03-25 @ 07:00  --------------------------------------------------------  IN: 900 mL / OUT: 850 mL / NET: 50 mL            PHYSICAL EXAM:    GENERAL: well-groomed, well-developed, on hf/nr, anxious   HEAD:  Atraumatic, Normocephalic  EYES: EOMI, PERRLA, conjunctiva and sclera clear  ENMT: No tonsillar erythema, exudates, or enlargement; Moist mucous membranes, Good dentition, No lesions  NECK: Supple, normal appearance, No JVD; Normal thyroid; Trachea midline  NERVOUS SYSTEM:  Alert & Oriented X3, Good concentration; Motor Strength 5/5 B/L upper and lower extremities; DTRs 2+ intact and symmetric  CHEST/LUNG: DECREASED BREATH SOUNDS bilaterally, No rales, rhonchi, wheezing. On HFNC 50/100 + NRB 15 L  HEART: s1,s2  No murmurs, rubs, or gallops  ABDOMEN: Soft, Nontender, Nondistended;  EXTREMITIES:  2+ Peripheral Pulses, No clubbing, cyanosis, or edema  LYMPH: No lymphadenopathy noted  SKIN: No rashes or lesions; Good capillary refill      LABS:  CBC Full  -  ( 25 Mar 2021 05:41 )  WBC Count : 15.21 K/uL  RBC Count : 4.77 M/uL  Hemoglobin : 15.1 g/dL  Hematocrit : 44.7 %  Platelet Count - Automated : 378 K/uL  Mean Cell Volume : 93.7 fl  Mean Cell Hemoglobin : 31.7 pg  Mean Cell Hemoglobin Concentration : 33.8 gm/dL  Auto Neutrophil # : x  Auto Lymphocyte # : x  Auto Monocyte # : x  Auto Eosinophil # : x  Auto Basophil # : x  Auto Neutrophil % : x  Auto Lymphocyte % : x  Auto Monocyte % : x  Auto Eosinophil % : x  Auto Basophil % : x    03-25    136  |  103  |  29<H>  ----------------------------<  84  4.6   |  23  |  0.78    Ca    8.4      25 Mar 2021 05:41  Phos  2.7     03-25  Mg     2.1     03-25    TPro  6.6  /  Alb  2.4<L>  /  TBili  1.4<H>  /  DBili  x   /  AST  33  /  ALT  132<H>  /  AlkPhos  83  03-25        GOALS OF CARE DISCUSSION WITH PATIENT/FAMILY/PROXY: full code    CRITICAL CARE TIME SPENT: 35 minutes INTERVAL HPI/OVERNIGHT EVENTS: no acute event overnight        Pulmonary:  ALBUTerol    90 MICROgram(s) HFA Inhaler 2 Puff(s) Inhalation every 6 hours    Hematalogic:  aspirin enteric coated 81 milliGRAM(s) Oral daily  enoxaparin Injectable 40 milliGRAM(s) SubCutaneous daily    Other:  acetaminophen   Tablet .. 650 milliGRAM(s) Oral every 6 hours PRN  ascorbic acid 1000 milliGRAM(s) Oral daily  chlorhexidine 2% Cloths 1 Application(s) Topical <User Schedule>  insulin lispro (ADMELOG) corrective regimen sliding scale   SubCutaneous three times a day before meals  pantoprazole    Tablet 40 milliGRAM(s) Oral before breakfast    acetaminophen   Tablet .. 650 milliGRAM(s) Oral every 6 hours PRN  ALBUTerol    90 MICROgram(s) HFA Inhaler 2 Puff(s) Inhalation every 6 hours  ascorbic acid 1000 milliGRAM(s) Oral daily  aspirin enteric coated 81 milliGRAM(s) Oral daily  chlorhexidine 2% Cloths 1 Application(s) Topical <User Schedule>  enoxaparin Injectable 40 milliGRAM(s) SubCutaneous daily  insulin lispro (ADMELOG) corrective regimen sliding scale   SubCutaneous three times a day before meals  pantoprazole    Tablet 40 milliGRAM(s) Oral before breakfast    Drug Dosing Weight  Height (cm): 167.6 (14 Mar 2021 12:03)  Weight (kg): 83.869 (14 Mar 2021 12:03)  BMI (kg/m2): 29.9 (14 Mar 2021 12:03)  BSA (m2): 1.93 (14 Mar 2021 12:03)    CENTRAL LINE: [ ] YES [ X] NO  LOCATION:   DATE INSERTED:  REMOVE: [ ] YES [ ] NO  EXPLAIN:    RIVERA: [ ] YES [x ] NO    DATE INSERTED:  REMOVE:  [ ] YES [ ] NO  EXPLAIN:    A-LINE:  [ ] YES [X ] NO  LOCATION:   DATE INSERTED:  REMOVE:  [ ] YES [ ] NO  EXPLAIN:      PMH -reviewed admission note, no change since admission    ICU Vital Signs Last 24 Hrs  T(C): 36.1 (25 Mar 2021 04:00), Max: 36.7 (24 Mar 2021 13:00)  T(F): 97 (25 Mar 2021 04:00), Max: 98.1 (24 Mar 2021 13:00)  HR: 76 (25 Mar 2021 06:00) (69 - 100)  BP: 126/60 (25 Mar 2021 06:00) (117/67 - 142/68)  BP(mean): 75 (25 Mar 2021 06:00) (67 - 105)  ABP: --  ABP(mean): --  RR: 31 (25 Mar 2021 06:00) (19 - 39)  SpO2: 91% (25 Mar 2021 06:00) (90% - 98%)            03-24 @ 07:01  -  03-25 @ 07:00  --------------------------------------------------------  IN: 900 mL / OUT: 850 mL / NET: 50 mL            PHYSICAL EXAM:    GENERAL: well-groomed, well-developed, on hf/nr, anxious   HEAD:  Atraumatic, Normocephalic  EYES: EOMI, PERRLA, conjunctiva and sclera clear  ENMT: No tonsillar erythema, exudates, or enlargement; Moist mucous membranes, Good dentition, No lesions  NECK: Supple, normal appearance, No JVD; Normal thyroid; Trachea midline  NERVOUS SYSTEM:  Alert & Oriented X3, Good concentration; Motor Strength 5/5 B/L upper and lower extremities; DTRs 2+ intact and symmetric  CHEST/LUNG: DECREASED BREATH SOUNDS bilaterally, No rales, rhonchi, wheezing. On HFNC 50/100 + NRB 15 L  HEART: s1,s2  No murmurs, rubs, or gallops  ABDOMEN: Soft, Nontender, Nondistended;  EXTREMITIES:  2+ Peripheral Pulses, No clubbing, cyanosis, or edema  LYMPH: No lymphadenopathy noted  SKIN: No rashes or lesions; Good capillary refill      LABS:  CBC Full  -  ( 25 Mar 2021 05:41 )  WBC Count : 15.21 K/uL  RBC Count : 4.77 M/uL  Hemoglobin : 15.1 g/dL  Hematocrit : 44.7 %  Platelet Count - Automated : 378 K/uL  Mean Cell Volume : 93.7 fl  Mean Cell Hemoglobin : 31.7 pg  Mean Cell Hemoglobin Concentration : 33.8 gm/dL  Auto Neutrophil # : x  Auto Lymphocyte # : x  Auto Monocyte # : x  Auto Eosinophil # : x  Auto Basophil # : x  Auto Neutrophil % : x  Auto Lymphocyte % : x  Auto Monocyte % : x  Auto Eosinophil % : x  Auto Basophil % : x    03-25    136  |  103  |  29<H>  ----------------------------<  84  4.6   |  23  |  0.78    Ca    8.4      25 Mar 2021 05:41  Phos  2.7     03-25  Mg     2.1     03-25    TPro  6.6  /  Alb  2.4<L>  /  TBili  1.4<H>  /  DBili  x   /  AST  33  /  ALT  132<H>  /  AlkPhos  83  03-25        GOALS OF CARE DISCUSSION WITH PATIENT/FAMILY/PROXY: full code      CXR:< from: Xray Chest 1 View- PORTABLE-Routine (Xray Chest 1 View- PORTABLE-Routine in AM.) (03.25.21 @ 09:28) >    EXAM:  XR CHEST PORTABLE ROUTINE 1V                            PROCEDURE DATE:  03/25/2021          INTERPRETATION:  CLINICAL STATEMENT: Follow-up chest pain.    TECHNIQUE: AP view of the chest.    COMPARISON: 3/24/2021    FINDINGS/  IMPRESSION:  Overlying chin obscures lung apices. Questionable tiny left apical pneumothorax. Follow-up recommended    Bilateral airspace opacities lung bases without significant change given differences in technique.    No significant pleural effusion    Heart size cannot be accurately assessed in this projection.              TYE GALAN MD; Attending Radiologist  This document has been electronically signed. Mar 25 2021  1:12PM    < end of copied text >    CRITICAL CARE TIME SPENT: 35 minutes

## 2021-03-25 NOTE — PROGRESS NOTE ADULT - ASSESSMENT
Assessment and Recommendation:   Problem/Recommendation - 1:  Problem: COVID-19. Recommendation: isolation precautions  oxygen supp - On HFNC   ICU consulted; patient transferred to ICU for further management.   Monitor oxygen sat  Check LFT, LDH, CRP, D-Dimer, Ferritin and procalcitonin  Vit C, D and zinc supp  Montelukast 10 mgs po Qhs  IV steroids.  Management per ICU   Low threshold for intubation   DVT and GI PPX     Problem/Recommendation - 2:  ·  Problem: UTI (urinary tract infection).  Recommendation: check pending culture  Antibiotics.     Problem/Recommendation - 3:  ·  Problem: Chest pain.  Recommendation: likely related to Covid-19 infection.     Problem/Recommendation - 4:  ·  Problem: Transaminitis.  Recommendation: monitor LFTs  GI F/U     Problem/Recommendation - 5:  ·  Problem: Pneumothorax.  Recommendation: New trace right apical pneumothorax. New mild left apical pneumothorax.  Thoracic sx eval noted.  CXR showed no pneumothorax on XR may be r/t positioning.    Pneumomediastinum and SQ emphysema noted.   Continue to f/u CXR   Management per ICU

## 2021-03-25 NOTE — PROGRESS NOTE ADULT - ASSESSMENT
55y.o. Male with COVID, b/l pneumo    -Daily CXR  -O2 supplement prn  -Supportive care for COVID  -No thoracic intervention unless pneumothorax worsens and/or pt requires intubation

## 2021-03-26 LAB
ALBUMIN SERPL ELPH-MCNC: 2.3 G/DL — LOW (ref 3.5–5)
ALP SERPL-CCNC: 83 U/L — SIGNIFICANT CHANGE UP (ref 40–120)
ALT FLD-CCNC: 122 U/L DA — HIGH (ref 10–60)
ANION GAP SERPL CALC-SCNC: 11 MMOL/L — SIGNIFICANT CHANGE UP (ref 5–17)
AST SERPL-CCNC: 33 U/L — SIGNIFICANT CHANGE UP (ref 10–40)
BILIRUB SERPL-MCNC: 1.1 MG/DL — SIGNIFICANT CHANGE UP (ref 0.2–1.2)
BUN SERPL-MCNC: 28 MG/DL — HIGH (ref 7–18)
CALCIUM SERPL-MCNC: 8.8 MG/DL — SIGNIFICANT CHANGE UP (ref 8.4–10.5)
CHLORIDE SERPL-SCNC: 101 MMOL/L — SIGNIFICANT CHANGE UP (ref 96–108)
CO2 SERPL-SCNC: 22 MMOL/L — SIGNIFICANT CHANGE UP (ref 22–31)
CREAT SERPL-MCNC: 0.71 MG/DL — SIGNIFICANT CHANGE UP (ref 0.5–1.3)
FERRITIN SERPL-MCNC: 2402 NG/ML — HIGH (ref 30–400)
FERRITIN SERPL-MCNC: 2472 NG/ML — HIGH (ref 30–400)
GLUCOSE BLDC GLUCOMTR-MCNC: 115 MG/DL — HIGH (ref 70–99)
GLUCOSE BLDC GLUCOMTR-MCNC: 118 MG/DL — HIGH (ref 70–99)
GLUCOSE BLDC GLUCOMTR-MCNC: 133 MG/DL — HIGH (ref 70–99)
GLUCOSE BLDC GLUCOMTR-MCNC: 143 MG/DL — HIGH (ref 70–99)
GLUCOSE SERPL-MCNC: 129 MG/DL — HIGH (ref 70–99)
HCT VFR BLD CALC: 46.6 % — SIGNIFICANT CHANGE UP (ref 39–50)
HGB BLD-MCNC: 15.8 G/DL — SIGNIFICANT CHANGE UP (ref 13–17)
MAGNESIUM SERPL-MCNC: 2.3 MG/DL — SIGNIFICANT CHANGE UP (ref 1.6–2.6)
MCHC RBC-ENTMCNC: 31.7 PG — SIGNIFICANT CHANGE UP (ref 27–34)
MCHC RBC-ENTMCNC: 33.9 GM/DL — SIGNIFICANT CHANGE UP (ref 32–36)
MCV RBC AUTO: 93.4 FL — SIGNIFICANT CHANGE UP (ref 80–100)
NRBC # BLD: 0 /100 WBCS — SIGNIFICANT CHANGE UP (ref 0–0)
PHOSPHATE SERPL-MCNC: 3.5 MG/DL — SIGNIFICANT CHANGE UP (ref 2.5–4.5)
PLATELET # BLD AUTO: 377 K/UL — SIGNIFICANT CHANGE UP (ref 150–400)
POTASSIUM SERPL-MCNC: 4.8 MMOL/L — SIGNIFICANT CHANGE UP (ref 3.5–5.3)
POTASSIUM SERPL-SCNC: 4.8 MMOL/L — SIGNIFICANT CHANGE UP (ref 3.5–5.3)
PROCALCITONIN SERPL-MCNC: 0.13 NG/ML — HIGH (ref 0.02–0.1)
PROT SERPL-MCNC: 6.9 G/DL — SIGNIFICANT CHANGE UP (ref 6–8.3)
RBC # BLD: 4.99 M/UL — SIGNIFICANT CHANGE UP (ref 4.2–5.8)
RBC # FLD: 12.6 % — SIGNIFICANT CHANGE UP (ref 10.3–14.5)
SODIUM SERPL-SCNC: 134 MMOL/L — LOW (ref 135–145)
WBC # BLD: 12 K/UL — HIGH (ref 3.8–10.5)
WBC # FLD AUTO: 12 K/UL — HIGH (ref 3.8–10.5)

## 2021-03-26 PROCEDURE — 71045 X-RAY EXAM CHEST 1 VIEW: CPT | Mod: 26

## 2021-03-26 RX ORDER — SODIUM CHLORIDE 9 MG/ML
1000 INJECTION INTRAMUSCULAR; INTRAVENOUS; SUBCUTANEOUS ONCE
Refills: 0 | Status: COMPLETED | OUTPATIENT
Start: 2021-03-26 | End: 2021-03-26

## 2021-03-26 RX ORDER — LACTULOSE 10 G/15ML
15 SOLUTION ORAL AT BEDTIME
Refills: 0 | Status: COMPLETED | OUTPATIENT
Start: 2021-03-26 | End: 2021-03-27

## 2021-03-26 RX ORDER — SODIUM CHLORIDE 9 MG/ML
1000 INJECTION INTRAMUSCULAR; INTRAVENOUS; SUBCUTANEOUS
Refills: 0 | Status: DISCONTINUED | OUTPATIENT
Start: 2021-03-26 | End: 2021-04-01

## 2021-03-26 RX ORDER — LACTULOSE 10 G/15ML
15 SOLUTION ORAL AT BEDTIME
Refills: 0 | Status: DISCONTINUED | OUTPATIENT
Start: 2021-03-26 | End: 2021-03-26

## 2021-03-26 RX ADMIN — ALBUTEROL 2 PUFF(S): 90 AEROSOL, METERED ORAL at 21:07

## 2021-03-26 RX ADMIN — ALBUTEROL 2 PUFF(S): 90 AEROSOL, METERED ORAL at 03:32

## 2021-03-26 RX ADMIN — Medication 100 MILLIGRAM(S): at 17:16

## 2021-03-26 RX ADMIN — Medication 1 SPRAY(S): at 03:33

## 2021-03-26 RX ADMIN — SODIUM CHLORIDE 90 MILLILITER(S): 9 INJECTION INTRAMUSCULAR; INTRAVENOUS; SUBCUTANEOUS at 11:47

## 2021-03-26 RX ADMIN — PANTOPRAZOLE SODIUM 40 MILLIGRAM(S): 20 TABLET, DELAYED RELEASE ORAL at 05:17

## 2021-03-26 RX ADMIN — SODIUM CHLORIDE 1000 MILLILITER(S): 9 INJECTION INTRAMUSCULAR; INTRAVENOUS; SUBCUTANEOUS at 11:47

## 2021-03-26 RX ADMIN — Medication 81 MILLIGRAM(S): at 11:46

## 2021-03-26 RX ADMIN — ENOXAPARIN SODIUM 40 MILLIGRAM(S): 100 INJECTION SUBCUTANEOUS at 11:46

## 2021-03-26 RX ADMIN — SENNA PLUS 2 TABLET(S): 8.6 TABLET ORAL at 21:08

## 2021-03-26 RX ADMIN — Medication 0.25 MILLIGRAM(S): at 17:17

## 2021-03-26 RX ADMIN — Medication 1000 MILLIGRAM(S): at 11:46

## 2021-03-26 RX ADMIN — ALBUTEROL 2 PUFF(S): 90 AEROSOL, METERED ORAL at 09:08

## 2021-03-26 RX ADMIN — CHLORHEXIDINE GLUCONATE 1 APPLICATION(S): 213 SOLUTION TOPICAL at 05:17

## 2021-03-26 RX ADMIN — LACTULOSE 15 GRAM(S): 10 SOLUTION ORAL at 21:07

## 2021-03-26 RX ADMIN — POLYETHYLENE GLYCOL 3350 17 GRAM(S): 17 POWDER, FOR SOLUTION ORAL at 11:47

## 2021-03-26 RX ADMIN — ALBUTEROL 2 PUFF(S): 90 AEROSOL, METERED ORAL at 15:28

## 2021-03-26 RX ADMIN — Medication 100 MILLIGRAM(S): at 05:19

## 2021-03-26 RX ADMIN — Medication 0.25 MILLIGRAM(S): at 05:17

## 2021-03-26 NOTE — PROGRESS NOTE ADULT - SUBJECTIVE AND OBJECTIVE BOX
INTERVAL HPI/OVERNIGHT EVENTS: Pt overnight was saturating in 80-90s.       Pulmonary:  ALBUTerol    90 MICROgram(s) HFA Inhaler 2 Puff(s) Inhalation every 6 hours    Hematalogic:  aspirin enteric coated 81 milliGRAM(s) Oral daily  enoxaparin Injectable 40 milliGRAM(s) SubCutaneous daily    Other:  acetaminophen   Tablet .. 650 milliGRAM(s) Oral every 6 hours PRN  ALPRAZolam 0.25 milliGRAM(s) Oral two times a day  ascorbic acid 1000 milliGRAM(s) Oral daily  chlorhexidine 2% Cloths 1 Application(s) Topical <User Schedule>  insulin lispro (ADMELOG) corrective regimen sliding scale   SubCutaneous three times a day before meals  methylPREDNISolone sodium succinate IVPB 250 milliGRAM(s) IV Intermittent two times a day  morphine  - Injectable 2 milliGRAM(s) IV Push every 4 hours PRN  pantoprazole    Tablet 40 milliGRAM(s) Oral before breakfast  polyethylene glycol 3350 17 Gram(s) Oral daily  senna 2 Tablet(s) Oral at bedtime  sodium chloride 0.65% Nasal 1 Spray(s) Both Nostrils two times a day PRN  sodium chloride 0.9%. 1000 milliLiter(s) IV Continuous <Continuous>    acetaminophen   Tablet .. 650 milliGRAM(s) Oral every 6 hours PRN  ALBUTerol    90 MICROgram(s) HFA Inhaler 2 Puff(s) Inhalation every 6 hours  ALPRAZolam 0.25 milliGRAM(s) Oral two times a day  ascorbic acid 1000 milliGRAM(s) Oral daily  aspirin enteric coated 81 milliGRAM(s) Oral daily  chlorhexidine 2% Cloths 1 Application(s) Topical <User Schedule>  enoxaparin Injectable 40 milliGRAM(s) SubCutaneous daily  insulin lispro (ADMELOG) corrective regimen sliding scale   SubCutaneous three times a day before meals  methylPREDNISolone sodium succinate IVPB 250 milliGRAM(s) IV Intermittent two times a day  morphine  - Injectable 2 milliGRAM(s) IV Push every 4 hours PRN  pantoprazole    Tablet 40 milliGRAM(s) Oral before breakfast  polyethylene glycol 3350 17 Gram(s) Oral daily  senna 2 Tablet(s) Oral at bedtime  sodium chloride 0.65% Nasal 1 Spray(s) Both Nostrils two times a day PRN  sodium chloride 0.9%. 1000 milliLiter(s) IV Continuous <Continuous>    Drug Dosing Weight  Height (cm): 167.6 (14 Mar 2021 12:03)  Weight (kg): 83.869 (14 Mar 2021 12:03)  BMI (kg/m2): 29.9 (14 Mar 2021 12:03)  BSA (m2): 1.93 (14 Mar 2021 12:03)    CENTRAL LINE: [ ] YES [ X] NO  LOCATION:   DATE INSERTED:  REMOVE: [ ] YES [ ] NO  EXPLAIN:    RIVERA: [ ] YES [x ] NO    DATE INSERTED:  REMOVE:  [ ] YES [ ] NO  EXPLAIN:    A-LINE:  [ ] YES [X ] NO  LOCATION:   DATE INSERTED:  REMOVE:  [ ] YES [ ] NO  EXPLAIN:      PMH -reviewed admission note, no change since admission    ICU Vital Signs Last 24 Hrs  T(C): 35.9 (26 Mar 2021 07:00), Max: 36.5 (25 Mar 2021 20:30)  T(F): 96.7 (26 Mar 2021 07:00), Max: 97.7 (25 Mar 2021 20:30)  HR: 111 (26 Mar 2021 12:40) (71 - 142)  BP: 155/82 (26 Mar 2021 12:00) (103/60 - 155/82)  BP(mean): 99 (26 Mar 2021 12:00) (59 - 101)  ABP: --  ABP(mean): --  RR: 38 (26 Mar 2021 12:00) (20 - 41)  SpO2: 89% (26 Mar 2021 12:40) (83% - 96%)            03-25 @ 07:01  -  03-26 @ 07:00  --------------------------------------------------------  IN: 1450 mL / OUT: 550 mL / NET: 900 mL            PHYSICAL EXAM:    GENERAL: well-groomed, well-developed, on hf/nr, anxious   HEAD:  Atraumatic, Normocephalic  EYES: EOMI, PERRLA, conjunctiva and sclera clear  ENMT: No tonsillar erythema, exudates, or enlargement; Moist mucous membranes, Good dentition, No lesions  NECK: Supple, normal appearance, No JVD; Normal thyroid; Trachea midline  NERVOUS SYSTEM:  Alert & Oriented X3, Motor Strength 5/5 B/L upper and lower extremities;  CHEST/LUNG: DECREASED BREATH SOUNDS bilaterally, No rales, rhonchi, wheezing. On HFNC 50/100 + NRB 15 L  HEART: s1,s2  No murmurs, rubs, or gallops  ABDOMEN: Soft, Nontender, Nondistended;  EXTREMITIES:  2+ Peripheral Pulses, No clubbing, cyanosis, or edema  LYMPH: No lymphadenopathy noted  SKIN: No rashes or lesions; Good capillary refill      LABS:  CBC Full  -  ( 26 Mar 2021 06:33 )  WBC Count : 12.00 K/uL  RBC Count : 4.99 M/uL  Hemoglobin : 15.8 g/dL  Hematocrit : 46.6 %  Platelet Count - Automated : 377 K/uL  Mean Cell Volume : 93.4 fl  Mean Cell Hemoglobin : 31.7 pg  Mean Cell Hemoglobin Concentration : 33.9 gm/dL  Auto Neutrophil # : x  Auto Lymphocyte # : x  Auto Monocyte # : x  Auto Eosinophil # : x  Auto Basophil # : x  Auto Neutrophil % : x  Auto Lymphocyte % : x  Auto Monocyte % : x  Auto Eosinophil % : x  Auto Basophil % : x    03-26    134<L>  |  101  |  28<H>  ----------------------------<  129<H>  4.8   |  22  |  0.71    Ca    8.8      26 Mar 2021 06:33  Phos  3.5     03-26  Mg     2.3     03-26    TPro  6.9  /  Alb  2.3<L>  /  TBili  1.1  /  DBili  x   /  AST  33  /  ALT  122<H>  /  AlkPhos  83  03-26          GOALS OF CARE DISCUSSION WITH PATIENT/FAMILY/PROXY: full code    CRITICAL CARE TIME SPENT: 35 minutes

## 2021-03-26 NOTE — PROGRESS NOTE ADULT - ATTENDING COMMENTS
55 yr old  man , non smoker with  moody 1990s presented 3/14 with x9 days worsening cough, subjective fevers, and SOB, with x2-3 days dysuria and central, non-radiating, constant CP. Admitted to medicine unit  for acute hypoxic respiratory failure secondary to pna from covid-19 infection .     Assessment:  1. Acute hypoxic respiratory failure  2 Covid-19 infection   3. Transaminitis  4. Prediabetes  5. Bilateral pneumothorax    Plan   -Thoracic surgery  no intervention   -Repeated CXR  is worst and monitor respiratory status  -isolation : contact and air borne   -HFNC support needed to maintain O2 sat>90%  -monitor biomarkers daily  -trial of semi- pulse steroid .. solumedrol 250 bid x 2/3 days   -dvt/gi prophy  -not a candidate for remdesivir due to covid-19 antibodies and elevated LFT  -not a candidate for Tociluzimab due to elevated LFT  -hemodynamic monitoring   -self prone as tolerated   -Oral diet  -OOB to chair   -PT evaluation .

## 2021-03-26 NOTE — PROGRESS NOTE ADULT - ASSESSMENT
Assessment and Recommendation:   Problem/Recommendation - 1:  Problem: COVID-19. Recommendation: isolation precautions  oxygen supp - On HFNC   ICU consulted; patient transferred to ICU for further management.   Monitor oxygen sat  Check LFT, LDH, CRP, D-Dimer, Ferritin and procalcitonin  Vit C, D and zinc supp  Montelukast 10 mgs po Qhs  IV steroids.  Management per ICU   Low threshold for intubation   DVT and GI PPX     Problem/Recommendation - 2:  ·  Problem: UTI (urinary tract infection).  Recommendation: F.U cultures   Antibiotics.     Problem/Recommendation - 3:  ·  Problem: Chest pain.  Recommendation: likely related to Covid-19 infection.     Problem/Recommendation - 4:  ·  Problem: Transaminitis.  Recommendation: monitor LFTs  GI F/U     Problem/Recommendation - 5:  ·  Problem: Pneumothorax.  Recommendation: New trace right apical pneumothorax. New mild left apical pneumothorax.  Thoracic sx eval noted.  CXR showed no pneumothorax   Continue to f/u CXR   Management per ICU

## 2021-03-26 NOTE — PROGRESS NOTE ADULT - SUBJECTIVE AND OBJECTIVE BOX
Pt is awake, alert, on HFNC. In ICU. Saturation 89%.     INTERVAL HPI/OVERNIGHT EVENTS:      VITAL SIGNS:  T(F): 96.7 (03-26-21 @ 07:00)  HR: 111 (03-26-21 @ 12:40)  BP: 155/82 (03-26-21 @ 12:00)  RR: 38 (03-26-21 @ 12:00)  SpO2: 89% (03-26-21 @ 12:40)  Wt(kg): --  I&O's Detail    25 Mar 2021 07:01  -  26 Mar 2021 07:00  --------------------------------------------------------  IN:    IV PiggyBack: 50 mL    Oral Fluid: 1400 mL  Total IN: 1450 mL    OUT:    Voided (mL): 550 mL  Total OUT: 550 mL    Total NET: 900 mL      26 Mar 2021 07:01  -  26 Mar 2021 13:59  --------------------------------------------------------  IN:    Oral Fluid: 600 mL    sodium chloride 0.9%: 90 mL    Sodium Chloride 0.9% Bolus: 1000 mL  Total IN: 1690 mL    OUT:    Voided (mL): 250 mL  Total OUT: 250 mL    Total NET: 1440 mL              REVIEW OF SYSTEMS:    CONSTITUTIONAL:  No fevers, chills, sweats    HEENT:  Eyes:  No diplopia or blurred vision. ENT:  No earache, sore throat or runny nose.    CARDIOVASCULAR:  No pressure, squeezing, tightness, or heaviness about the chest; no palpitations.    RESPIRATORY:  Per HPI    GASTROINTESTINAL:  No abdominal pain, nausea, vomiting or diarrhea.    GENITOURINARY:  No dysuria, frequency or urgency.    NEUROLOGIC:  No paresthesias, fasciculations, seizures or weakness.    PSYCHIATRIC:  No disorder of thought or mood.      PHYSICAL EXAM:    Constitutional: Well developed and nourished  Eyes:Perrla  ENMT: normal  Neck:supple  Respiratory: good air entry  Cardiovascular: S1 S2 regular  Gastrointestinal: Soft, Non tender  Extremities: No edema  Vascular:normal  Neurological:Awake, alert,Ox3  Musculoskeletal:Normal      MEDICATIONS  (STANDING):  ALBUTerol    90 MICROgram(s) HFA Inhaler 2 Puff(s) Inhalation every 6 hours  ALPRAZolam 0.25 milliGRAM(s) Oral two times a day  ascorbic acid 1000 milliGRAM(s) Oral daily  aspirin enteric coated 81 milliGRAM(s) Oral daily  chlorhexidine 2% Cloths 1 Application(s) Topical <User Schedule>  enoxaparin Injectable 40 milliGRAM(s) SubCutaneous daily  insulin lispro (ADMELOG) corrective regimen sliding scale   SubCutaneous three times a day before meals  methylPREDNISolone sodium succinate IVPB 250 milliGRAM(s) IV Intermittent two times a day  pantoprazole    Tablet 40 milliGRAM(s) Oral before breakfast  polyethylene glycol 3350 17 Gram(s) Oral daily  senna 2 Tablet(s) Oral at bedtime  sodium chloride 0.9%. 1000 milliLiter(s) (90 mL/Hr) IV Continuous <Continuous>    MEDICATIONS  (PRN):  acetaminophen   Tablet .. 650 milliGRAM(s) Oral every 6 hours PRN Temp greater or equal to 38C (100.4F), Moderate Pain (4 - 6)  morphine  - Injectable 2 milliGRAM(s) IV Push every 4 hours PRN air hunger  sodium chloride 0.65% Nasal 1 Spray(s) Both Nostrils two times a day PRN Nasal Congestion      Allergies    No Known Allergies    Intolerances        LABS:                        15.8   12.00 )-----------( 377      ( 26 Mar 2021 06:33 )             46.6     03-26    134<L>  |  101  |  28<H>  ----------------------------<  129<H>  4.8   |  22  |  0.71    Ca    8.8      26 Mar 2021 06:33  Phos  3.5     03-26  Mg     2.3     03-26    TPro  6.9  /  Alb  2.3<L>  /  TBili  1.1  /  DBili  x   /  AST  33  /  ALT  122<H>  /  AlkPhos  83  03-26              CAPILLARY BLOOD GLUCOSE      POCT Blood Glucose.: 115 mg/dL (26 Mar 2021 11:24)  POCT Blood Glucose.: 133 mg/dL (26 Mar 2021 06:29)  POCT Blood Glucose.: 139 mg/dL (25 Mar 2021 23:53)  POCT Blood Glucose.: 159 mg/dL (25 Mar 2021 16:51)    pro-bnp -- 03-25 @ 05:41     d-dimer 242  03-25 @ 05:41  pro-bnp -- 03-20 @ 04:23     d-dimer 229  03-20 @ 04:23      RADIOLOGY & ADDITIONAL TESTS:    CXR:    < from: Xray Chest 1 View- PORTABLE-Urgent (Xray Chest 1 View- PORTABLE-Urgent .) (03.26.21 @ 09:18) >  IMPRESSION:  Bilateral airspace opacities without significant change. Study limited due to rotation. No gross pneumothorax.    Heart size cannot be accurately assessed in this projection.    < end of copied text >    Ct scan chest:    ekg;    echo:

## 2021-03-26 NOTE — PROGRESS NOTE ADULT - SUBJECTIVE AND OBJECTIVE BOX
INTERVAL HPI/OVERNIGHT EVENTS:  Pt sitting in chair on O2 support.  Denies SOB.      MEDICATIONS  (STANDING):  ALBUTerol    90 MICROgram(s) HFA Inhaler 2 Puff(s) Inhalation every 6 hours  ALPRAZolam 0.25 milliGRAM(s) Oral two times a day  ascorbic acid 1000 milliGRAM(s) Oral daily  aspirin enteric coated 81 milliGRAM(s) Oral daily  chlorhexidine 2% Cloths 1 Application(s) Topical <User Schedule>  enoxaparin Injectable 40 milliGRAM(s) SubCutaneous daily  insulin lispro (ADMELOG) corrective regimen sliding scale   SubCutaneous three times a day before meals  methylPREDNISolone sodium succinate IVPB 250 milliGRAM(s) IV Intermittent two times a day  pantoprazole    Tablet 40 milliGRAM(s) Oral before breakfast  polyethylene glycol 3350 17 Gram(s) Oral daily  senna 2 Tablet(s) Oral at bedtime    MEDICATIONS  (PRN):  acetaminophen   Tablet .. 650 milliGRAM(s) Oral every 6 hours PRN Temp greater or equal to 38C (100.4F), Moderate Pain (4 - 6)  morphine  - Injectable 2 milliGRAM(s) IV Push every 4 hours PRN air hunger  sodium chloride 0.65% Nasal 1 Spray(s) Both Nostrils two times a day PRN Nasal Congestion    ICU Vital Signs Last 24 Hrs  T(C): 35.9 (26 Mar 2021 07:00), Max: 36.5 (25 Mar 2021 20:30)  T(F): 96.7 (26 Mar 2021 07:00), Max: 97.7 (25 Mar 2021 20:30)  HR: 112 (26 Mar 2021 08:32) (71 - 142)  BP: 132/94 (26 Mar 2021 07:00) (103/60 - 148/72)  BP(mean): 100 (26 Mar 2021 07:00) (59 - 101)  ABP: --  ABP(mean): --  RR: 21 (26 Mar 2021 07:00) (20 - 45)  SpO2: 89% (26 Mar 2021 08:32) (83% - 97%)      Physical:  General: A&Ox3. NAD.  Chest: Symmetrical rise of chest b/l with use of accessory muscles.    Radiology:

## 2021-03-26 NOTE — PROGRESS NOTE ADULT - SUBJECTIVE AND OBJECTIVE BOX
Patient is a 55y old  Male who presents with a chief complaint of SOB (25 Mar 2021 12:16)    pt seen in icu [ x ], reg med floor [  ], bed [ x ], chair at bedside [   ], a+o x3 [ x ], lethargic [  ],    nad [ x ]      Allergies    No Known Allergies        Vitals    T(F): 96.7 (03-26-21 @ 00:00), Max: 97.7 (03-25-21 @ 20:30)  HR: 74 (03-26-21 @ 06:00) (71 - 142)  BP: 125/68 (03-26-21 @ 06:00) (103/60 - 148/72)  RR: 32 (03-26-21 @ 06:00) (20 - 45)  SpO2: 83% (03-26-21 @ 06:00) (83% - 97%)  Wt(kg): --  CAPILLARY BLOOD GLUCOSE      POCT Blood Glucose.: 133 mg/dL (26 Mar 2021 06:29)      Labs                          15.8   12.00 )-----------( 377      ( 26 Mar 2021 06:33 )             46.6       03-25    136  |  103  |  29<H>  ----------------------------<  84  4.6   |  23  |  0.78    Ca    8.4      25 Mar 2021 05:41  Phos  2.7     03-25  Mg     2.1     03-25    TPro  6.6  /  Alb  2.4<L>  /  TBili  1.4<H>  /  DBili  x   /  AST  33  /  ALT  132<H>  /  AlkPhos  83  03-25            .Urine Clean Catch (Midstream)  03-15 @ 00:52   No growth  --  --          Radiology Results      Meds    MEDICATIONS  (STANDING):  ALBUTerol    90 MICROgram(s) HFA Inhaler 2 Puff(s) Inhalation every 6 hours  ALPRAZolam 0.25 milliGRAM(s) Oral two times a day  ascorbic acid 1000 milliGRAM(s) Oral daily  aspirin enteric coated 81 milliGRAM(s) Oral daily  chlorhexidine 2% Cloths 1 Application(s) Topical <User Schedule>  enoxaparin Injectable 40 milliGRAM(s) SubCutaneous daily  insulin lispro (ADMELOG) corrective regimen sliding scale   SubCutaneous three times a day before meals  methylPREDNISolone sodium succinate IVPB 250 milliGRAM(s) IV Intermittent two times a day  pantoprazole    Tablet 40 milliGRAM(s) Oral before breakfast  polyethylene glycol 3350 17 Gram(s) Oral daily  senna 2 Tablet(s) Oral at bedtime      MEDICATIONS  (PRN):  acetaminophen   Tablet .. 650 milliGRAM(s) Oral every 6 hours PRN Temp greater or equal to 38C (100.4F), Moderate Pain (4 - 6)  morphine  - Injectable 2 milliGRAM(s) IV Push every 4 hours PRN air hunger  sodium chloride 0.65% Nasal 1 Spray(s) Both Nostrils two times a day PRN Nasal Congestion      Physical Exam    Neuro :  no focal deficits  Respiratory: CTA B/L  CV: RRR, S1S2, no murmurs,   Abdominal: Soft, NT, ND +BS,  Extremities: No edema, + peripheral pulses    ASSESSMENT    Hypoxemia 2nd to covid pna   transaminitis  prediabetes  h/o appendectomy  cholecystectomy        PLAN    contact and airborne isolation  d/c remdesevir given covid ab positive noted   cont dexamethasone  cont asa, vit c,    cont albuterol inhaler   pulm f/u  procalcitonin, D-dimer, crp, ldh, ferritin, lactate noted ,    cont tylenol prn,   cont robitussin prn   O2 sat (90% - 98%) hi miguel ángel  O2 via hi miguel ángel  pt low threshold for intubation   xray 3/19/21 with pneumomediastinum  rept cxr with New trace right apical pneumothorax. New mild left apical pneumothorax. Grossly stable small pneumomediastinum.  Soft tissue emphysema at the neck bases bilaterally. Grossly stable bilateral pulmonary infiltrates noted.   cxr 2/24 with No evidence of pneumothorax can be appreciated on the available image. This may be related to patient positioning. Evidence of pneumomediastinum and subcutaneous emphysema in the lower neck is again noted. There are patchy bibasilar infiltrates and elevated right hemidiaphragm noted above.  thoracic surg f/u   f/u daily cxr  No thoracic intervention unless pneumothorax worsens and/or pt requires intubation  f/u acute hepatitis panel  lispro ss  cont current meds  cont mgmt as per icu       Patient is a 55y old  Male who presents with a chief complaint of SOB (25 Mar 2021 12:16)    pt seen in icu [ x ], reg med floor [  ], bed [ x ], chair at bedside [   ], a+o x3 [ x ], lethargic [  ],    nad [ x ]      Allergies    No Known Allergies        Vitals    T(F): 96.7 (03-26-21 @ 00:00), Max: 97.7 (03-25-21 @ 20:30)  HR: 74 (03-26-21 @ 06:00) (71 - 142)  BP: 125/68 (03-26-21 @ 06:00) (103/60 - 148/72)  RR: 32 (03-26-21 @ 06:00) (20 - 45)  SpO2: 83% (03-26-21 @ 06:00) (83% - 97%)  Wt(kg): --  CAPILLARY BLOOD GLUCOSE      POCT Blood Glucose.: 133 mg/dL (26 Mar 2021 06:29)      Labs                          15.8   12.00 )-----------( 377      ( 26 Mar 2021 06:33 )             46.6       03-25    136  |  103  |  29<H>  ----------------------------<  84  4.6   |  23  |  0.78    Ca    8.4      25 Mar 2021 05:41  Phos  2.7     03-25  Mg     2.1     03-25    TPro  6.6  /  Alb  2.4<L>  /  TBili  1.4<H>  /  DBili  x   /  AST  33  /  ALT  132<H>  /  AlkPhos  83  03-25            .Urine Clean Catch (Midstream)  03-15 @ 00:52   No growth  --  --          Radiology Results      Meds    MEDICATIONS  (STANDING):  ALBUTerol    90 MICROgram(s) HFA Inhaler 2 Puff(s) Inhalation every 6 hours  ALPRAZolam 0.25 milliGRAM(s) Oral two times a day  ascorbic acid 1000 milliGRAM(s) Oral daily  aspirin enteric coated 81 milliGRAM(s) Oral daily  chlorhexidine 2% Cloths 1 Application(s) Topical <User Schedule>  enoxaparin Injectable 40 milliGRAM(s) SubCutaneous daily  insulin lispro (ADMELOG) corrective regimen sliding scale   SubCutaneous three times a day before meals  methylPREDNISolone sodium succinate IVPB 250 milliGRAM(s) IV Intermittent two times a day  pantoprazole    Tablet 40 milliGRAM(s) Oral before breakfast  polyethylene glycol 3350 17 Gram(s) Oral daily  senna 2 Tablet(s) Oral at bedtime      MEDICATIONS  (PRN):  acetaminophen   Tablet .. 650 milliGRAM(s) Oral every 6 hours PRN Temp greater or equal to 38C (100.4F), Moderate Pain (4 - 6)  morphine  - Injectable 2 milliGRAM(s) IV Push every 4 hours PRN air hunger  sodium chloride 0.65% Nasal 1 Spray(s) Both Nostrils two times a day PRN Nasal Congestion      Physical Exam    Neuro :  no focal deficits  Respiratory: CTA B/L  CV: RRR, S1S2, no murmurs,   Abdominal: Soft, NT, ND +BS,  Extremities: No edema, + peripheral pulses    ASSESSMENT    Hypoxemia 2nd to covid pna   transaminitis  prediabetes  h/o appendectomy  cholecystectomy        PLAN    contact and airborne isolation  d/c remdesevir given covid ab positive noted   completed dexamethasone   started pulse steroids for 3 days - 250mg solumedrol bid  cont asa, vit c,    cont albuterol inhaler   pulm f/u  procalcitonin, D-dimer, crp, ldh, ferritin, lactate noted ,    cont tylenol prn,   cont robitussin prn   O2 sat (83% - 97%) hi miguel ángel  O2 via hi miguel ángel  pt low threshold for intubation   xray 3/19/21 with pneumomediastinum  rept cxr with New trace right apical pneumothorax. New mild left apical pneumothorax. Grossly stable small pneumomediastinum.  Soft tissue emphysema at the neck bases bilaterally. Grossly stable bilateral pulmonary infiltrates noted.   cxr 2/24 with No evidence of pneumothorax can be appreciated on the available image. This may be related to patient positioning. Evidence of pneumomediastinum and subcutaneous emphysema in the lower neck is again noted. There are patchy bibasilar infiltrates and elevated right hemidiaphragm noted.  thoracic surg f/u   f/u daily cxr  No thoracic intervention unless pneumothorax worsens and/or pt requires intubation  f/u acute hepatitis panel  lispro ss  cont current meds  cont mgmt as per icu

## 2021-03-26 NOTE — PROGRESS NOTE ADULT - ASSESSMENT
ASSESSMENT AND PLAN:  55M, no PMH, PSH appy and moody 1990s presented 3/14 with x9 days worsening cough, subjective fevers, and SOB, with x2-3 days dysuria and central, non-radiating, constant CP. Admitted to [facility] for acute hypoxic respiratory failure s/t covid pneumonia, ICU consulted for increasing work of breathing on 15 lpm NRM.     1. Acute hypoxic respiratory failure  2. Covid pneumonia  3. Transaminitis  4. Prediabetes  5. Abnormal TSH    =================== Neuro============================  Alert and oriented x 3   on hf/nr , anxious      ================= Cardiovascular==========================  Hypertension  resolved  - Initially, noted to have multiple elevated BP readings  -Continue to monitor, may consider low dose acei/arb if hypertensive     TACHYCARDIA:  pT HR in 120s   2/2 hypoxia and anxiety  will keep monitoring   xanax prn      ================- Pulm=================================  Acute hypoxic respiratory failure: secondary to covid pneumonia    -On HFNC + NRB 15L   -D-dimer initially elevated on presentation, now wnl  -Remdesivir was discontinued due to positive antibodies   - add albuterol prn   -procalcitonin wnl, d-dimer in 200s  -started pulse steroids for 3 days - 250mg solumedrol bid- day 2  -Encourage use of incentive spirometry  -OOB/PT  -xanax for anxiousness, morphine 1mg q 4 prn for air hunger    #Pneumothorax and pneumomediastinum:  xray chest : pt has Grossly stable trace right apical pneumothorax. Grossly stable mild left apical pneumothorax. Stable small pneumomediastinum. Soft tissue emphysema at the neck bases again noted. Stable patchy bilateral pulmonary infiltrates.  thoracic surgery consulted recommended no intervention at this time, just observation for now  xray monitoring daily    ==================ID===================================  Covid pneumonia  -mild leukocytosis  -resolving  -remains afebrile  -plan as above     ================= Nephro================================  -No issues   -voiding without issue  -monitor lytes, SCr   -monitor I/Os    =================GI====================================  Transaminitis:   likely secondary to covid   - and  on presentation  -Improving-  -continue to monitor,  -  hepatitis panel -ve    ================ Heme==================================  Elevated d-dimer: likely secondary to covid  -d-dimer 423 on presentation, now wnl  -continue prophylactic Lovenox 40 mg QD    =================Endocrine===============================  Prediabetes:    -a1c  5.8  -BS controlled  -continue HSS  -monitor FS while on steroids    Abnormal TSH:  -TSH level noted 0.26,   -TSH 0.37 and   -f/u Free T4      ================= Skin/Catheters============================  No rashes. Peripheral IV lines.     - =================Prophylaxis =============================  Lovenox for DVT proph  Protonix for  GI proph    ==================GOC==================================   FULL CODE

## 2021-03-27 LAB
ALBUMIN SERPL ELPH-MCNC: 2.1 G/DL — LOW (ref 3.5–5)
ALP SERPL-CCNC: 83 U/L — SIGNIFICANT CHANGE UP (ref 40–120)
ALT FLD-CCNC: 103 U/L DA — HIGH (ref 10–60)
ANION GAP SERPL CALC-SCNC: 11 MMOL/L — SIGNIFICANT CHANGE UP (ref 5–17)
AST SERPL-CCNC: 32 U/L — SIGNIFICANT CHANGE UP (ref 10–40)
BILIRUB SERPL-MCNC: 0.7 MG/DL — SIGNIFICANT CHANGE UP (ref 0.2–1.2)
BUN SERPL-MCNC: 24 MG/DL — HIGH (ref 7–18)
CALCIUM SERPL-MCNC: 8.4 MG/DL — SIGNIFICANT CHANGE UP (ref 8.4–10.5)
CHLORIDE SERPL-SCNC: 106 MMOL/L — SIGNIFICANT CHANGE UP (ref 96–108)
CO2 SERPL-SCNC: 20 MMOL/L — LOW (ref 22–31)
CREAT SERPL-MCNC: 0.69 MG/DL — SIGNIFICANT CHANGE UP (ref 0.5–1.3)
CRP SERPL-MCNC: 59 MG/L — HIGH
FERRITIN SERPL-MCNC: 2351 NG/ML — HIGH (ref 30–400)
GLUCOSE BLDC GLUCOMTR-MCNC: 108 MG/DL — HIGH (ref 70–99)
GLUCOSE BLDC GLUCOMTR-MCNC: 114 MG/DL — HIGH (ref 70–99)
GLUCOSE BLDC GLUCOMTR-MCNC: 118 MG/DL — HIGH (ref 70–99)
GLUCOSE BLDC GLUCOMTR-MCNC: 149 MG/DL — HIGH (ref 70–99)
GLUCOSE SERPL-MCNC: 110 MG/DL — HIGH (ref 70–99)
HCT VFR BLD CALC: 43.6 % — SIGNIFICANT CHANGE UP (ref 39–50)
HGB BLD-MCNC: 14.6 G/DL — SIGNIFICANT CHANGE UP (ref 13–17)
MAGNESIUM SERPL-MCNC: 2.4 MG/DL — SIGNIFICANT CHANGE UP (ref 1.6–2.6)
MCHC RBC-ENTMCNC: 31.5 PG — SIGNIFICANT CHANGE UP (ref 27–34)
MCHC RBC-ENTMCNC: 33.5 GM/DL — SIGNIFICANT CHANGE UP (ref 32–36)
MCV RBC AUTO: 94.2 FL — SIGNIFICANT CHANGE UP (ref 80–100)
MRSA PCR RESULT.: SIGNIFICANT CHANGE UP
NRBC # BLD: 0 /100 WBCS — SIGNIFICANT CHANGE UP (ref 0–0)
PHOSPHATE SERPL-MCNC: 3 MG/DL — SIGNIFICANT CHANGE UP (ref 2.5–4.5)
PLATELET # BLD AUTO: 401 K/UL — HIGH (ref 150–400)
POTASSIUM SERPL-MCNC: 4.5 MMOL/L — SIGNIFICANT CHANGE UP (ref 3.5–5.3)
POTASSIUM SERPL-SCNC: 4.5 MMOL/L — SIGNIFICANT CHANGE UP (ref 3.5–5.3)
PROT SERPL-MCNC: 6.5 G/DL — SIGNIFICANT CHANGE UP (ref 6–8.3)
RBC # BLD: 4.63 M/UL — SIGNIFICANT CHANGE UP (ref 4.2–5.8)
RBC # FLD: 12.8 % — SIGNIFICANT CHANGE UP (ref 10.3–14.5)
S AUREUS DNA NOSE QL NAA+PROBE: SIGNIFICANT CHANGE UP
SODIUM SERPL-SCNC: 137 MMOL/L — SIGNIFICANT CHANGE UP (ref 135–145)
WBC # BLD: 19.23 K/UL — HIGH (ref 3.8–10.5)
WBC # FLD AUTO: 19.23 K/UL — HIGH (ref 3.8–10.5)

## 2021-03-27 RX ORDER — BENZOCAINE AND MENTHOL 5; 1 G/100ML; G/100ML
1 LIQUID ORAL DAILY
Refills: 0 | Status: DISCONTINUED | OUTPATIENT
Start: 2021-03-27 | End: 2021-03-29

## 2021-03-27 RX ADMIN — CHLORHEXIDINE GLUCONATE 1 APPLICATION(S): 213 SOLUTION TOPICAL at 05:30

## 2021-03-27 RX ADMIN — Medication 81 MILLIGRAM(S): at 11:11

## 2021-03-27 RX ADMIN — ALBUTEROL 2 PUFF(S): 90 AEROSOL, METERED ORAL at 21:19

## 2021-03-27 RX ADMIN — Medication 1000 MILLIGRAM(S): at 11:11

## 2021-03-27 RX ADMIN — Medication 100 MILLIGRAM(S): at 19:14

## 2021-03-27 RX ADMIN — ALBUTEROL 2 PUFF(S): 90 AEROSOL, METERED ORAL at 14:41

## 2021-03-27 RX ADMIN — ALBUTEROL 2 PUFF(S): 90 AEROSOL, METERED ORAL at 09:03

## 2021-03-27 RX ADMIN — SENNA PLUS 2 TABLET(S): 8.6 TABLET ORAL at 21:19

## 2021-03-27 RX ADMIN — ENOXAPARIN SODIUM 40 MILLIGRAM(S): 100 INJECTION SUBCUTANEOUS at 11:11

## 2021-03-27 RX ADMIN — POLYETHYLENE GLYCOL 3350 17 GRAM(S): 17 POWDER, FOR SOLUTION ORAL at 11:11

## 2021-03-27 RX ADMIN — Medication 100 MILLIGRAM(S): at 05:30

## 2021-03-27 RX ADMIN — LACTULOSE 15 GRAM(S): 10 SOLUTION ORAL at 21:19

## 2021-03-27 RX ADMIN — Medication 0.25 MILLIGRAM(S): at 05:29

## 2021-03-27 RX ADMIN — Medication 0.25 MILLIGRAM(S): at 18:09

## 2021-03-27 RX ADMIN — BENZOCAINE AND MENTHOL 1 LOZENGE: 5; 1 LIQUID ORAL at 21:20

## 2021-03-27 RX ADMIN — ALBUTEROL 2 PUFF(S): 90 AEROSOL, METERED ORAL at 02:31

## 2021-03-27 RX ADMIN — PANTOPRAZOLE SODIUM 40 MILLIGRAM(S): 20 TABLET, DELAYED RELEASE ORAL at 05:30

## 2021-03-27 NOTE — PROGRESS NOTE ADULT - ATTENDING COMMENTS
55 yr old  man , non smoker with  moody 1990s presented 3/14 with x9 days worsening cough, subjective fevers, and SOB, with x2-3 days dysuria and central, non-radiating, constant CP. Admitted to medicine unit  for acute hypoxic respiratory failure secondary to pna from covid-19 infection .     Assessment:  1. Acute hypoxic respiratory failure  2 Covid-19 infection   3. Transaminitis  4. Prediabetes  5. Bilateral pneumothorax    Plan   -Thoracic surgery  no intervention   -Repeated CXR  is worst and monitor respiratory status  -isolation : contact and air borne   -HFNC support needed to maintain O2 sat>90%  -monitor biomarkers daily  -trial of semi- pulse steroid .. solumedrol 250 bid x 3/3 days   -dvt/gi prophy  -not a candidate for remdesivir due to covid-19 antibodies and elevated LFT  -not a candidate for Tociluzimab due to elevated LFT  -hemodynamic monitoring   -self prone as tolerated   -Oral diet  -OOB to chair   -PT evaluation .

## 2021-03-27 NOTE — PROGRESS NOTE ADULT - SUBJECTIVE AND OBJECTIVE BOX
Patient is a 55y old  Male who presents with a chief complaint of SOB (27 Mar 2021 04:02)    pt seen in icu [ x ], reg med floor [  ], bed [ x ], chair at bedside [   ], a+o x3 [ x ], lethargic [  ],    nad [ x ]      Allergies    No Known Allergies        Vitals    T(F): 97.3 (03-27-21 @ 05:10), Max: 97.3 (03-27-21 @ 05:10)  HR: 65 (03-27-21 @ 04:30) (65 - 121)  BP: 140/75 (03-27-21 @ 02:00) (126/65 - 155/82)  RR: 28 (03-27-21 @ 02:00) (21 - 41)  SpO2: 92% (03-27-21 @ 04:30) (72% - 94%)  Wt(kg): --  CAPILLARY BLOOD GLUCOSE      POCT Blood Glucose.: 118 mg/dL (27 Mar 2021 06:36)      Labs                          15.8   12.00 )-----------( 377      ( 26 Mar 2021 06:33 )             46.6       03-26    134<L>  |  101  |  28<H>  ----------------------------<  129<H>  4.8   |  22  |  0.71    Ca    8.8      26 Mar 2021 06:33  Phos  3.5     03-26  Mg     2.3     03-26    TPro  6.9  /  Alb  2.3<L>  /  TBili  1.1  /  DBili  x   /  AST  33  /  ALT  122<H>  /  AlkPhos  83  03-26            .Urine Clean Catch (Midstream)  03-15 @ 00:52   No growth  --  --          Radiology Results      Meds    MEDICATIONS  (STANDING):  ALBUTerol    90 MICROgram(s) HFA Inhaler 2 Puff(s) Inhalation every 6 hours  ALPRAZolam 0.25 milliGRAM(s) Oral two times a day  ascorbic acid 1000 milliGRAM(s) Oral daily  aspirin enteric coated 81 milliGRAM(s) Oral daily  chlorhexidine 2% Cloths 1 Application(s) Topical <User Schedule>  enoxaparin Injectable 40 milliGRAM(s) SubCutaneous daily  insulin lispro (ADMELOG) corrective regimen sliding scale   SubCutaneous three times a day before meals  lactulose Syrup 15 Gram(s) Oral at bedtime  methylPREDNISolone sodium succinate IVPB 250 milliGRAM(s) IV Intermittent two times a day  pantoprazole    Tablet 40 milliGRAM(s) Oral before breakfast  polyethylene glycol 3350 17 Gram(s) Oral daily  senna 2 Tablet(s) Oral at bedtime  sodium chloride 0.9%. 1000 milliLiter(s) (90 mL/Hr) IV Continuous <Continuous>      MEDICATIONS  (PRN):  acetaminophen   Tablet .. 650 milliGRAM(s) Oral every 6 hours PRN Temp greater or equal to 38C (100.4F), Moderate Pain (4 - 6)  morphine  - Injectable 2 milliGRAM(s) IV Push every 4 hours PRN air hunger  sodium chloride 0.65% Nasal 1 Spray(s) Both Nostrils two times a day PRN Nasal Congestion      Physical Exam    Neuro :  no focal deficits  Respiratory: CTA B/L  CV: RRR, S1S2, no murmurs,   Abdominal: Soft, NT, ND +BS,  Extremities: No edema, + peripheral pulses    ASSESSMENT    Hypoxemia 2nd to covid pna   transaminitis  prediabetes  h/o appendectomy  cholecystectomy        PLAN    contact and airborne isolation  d/c remdesevir given covid ab positive noted   completed dexamethasone   started pulse steroids for 3 days - 250mg solumedrol bid  cont asa, vit c,    cont albuterol inhaler   pulm f/u  procalcitonin, D-dimer, crp, ldh, ferritin, lactate noted ,    cont tylenol prn,   cont robitussin prn   O2 sat (83% - 97%) hi miguel ángel  O2 via hi miguel ángel  pt low threshold for intubation   xray 3/19/21 with pneumomediastinum  rept cxr with New trace right apical pneumothorax. New mild left apical pneumothorax. Grossly stable small pneumomediastinum.  Soft tissue emphysema at the neck bases bilaterally. Grossly stable bilateral pulmonary infiltrates noted.   cxr 2/24 with No evidence of pneumothorax can be appreciated on the available image. This may be related to patient positioning. Evidence of pneumomediastinum and subcutaneous emphysema in the lower neck is again noted. There are patchy bibasilar infiltrates and elevated right hemidiaphragm noted.  thoracic surg f/u   f/u daily cxr  No thoracic intervention unless pneumothorax worsens and/or pt requires intubation  f/u acute hepatitis panel  lispro ss  cont current meds  cont mgmt as per icu   Patient is a 55y old  Male who presents with a chief complaint of SOB (27 Mar 2021 04:02)    pt seen in icu [ x ], reg med floor [  ], bed [ x ], chair at bedside [   ], a+o x3 [ x ], lethargic [  ],    nad [ x ]      Allergies    No Known Allergies        Vitals    T(F): 97.3 (03-27-21 @ 05:10), Max: 97.3 (03-27-21 @ 05:10)  HR: 65 (03-27-21 @ 04:30) (65 - 121)  BP: 140/75 (03-27-21 @ 02:00) (126/65 - 155/82)  RR: 28 (03-27-21 @ 02:00) (21 - 41)  SpO2: 92% (03-27-21 @ 04:30) (72% - 94%)  Wt(kg): --  CAPILLARY BLOOD GLUCOSE      POCT Blood Glucose.: 118 mg/dL (27 Mar 2021 06:36)      Labs                          15.8   12.00 )-----------( 377      ( 26 Mar 2021 06:33 )             46.6       03-26    134<L>  |  101  |  28<H>  ----------------------------<  129<H>  4.8   |  22  |  0.71    Ca    8.8      26 Mar 2021 06:33  Phos  3.5     03-26  Mg     2.3     03-26    TPro  6.9  /  Alb  2.3<L>  /  TBili  1.1  /  DBili  x   /  AST  33  /  ALT  122<H>  /  AlkPhos  83  03-26            .Urine Clean Catch (Midstream)  03-15 @ 00:52   No growth  --  --          Radiology Results      Meds    MEDICATIONS  (STANDING):  ALBUTerol    90 MICROgram(s) HFA Inhaler 2 Puff(s) Inhalation every 6 hours  ALPRAZolam 0.25 milliGRAM(s) Oral two times a day  ascorbic acid 1000 milliGRAM(s) Oral daily  aspirin enteric coated 81 milliGRAM(s) Oral daily  chlorhexidine 2% Cloths 1 Application(s) Topical <User Schedule>  enoxaparin Injectable 40 milliGRAM(s) SubCutaneous daily  insulin lispro (ADMELOG) corrective regimen sliding scale   SubCutaneous three times a day before meals  lactulose Syrup 15 Gram(s) Oral at bedtime  methylPREDNISolone sodium succinate IVPB 250 milliGRAM(s) IV Intermittent two times a day  pantoprazole    Tablet 40 milliGRAM(s) Oral before breakfast  polyethylene glycol 3350 17 Gram(s) Oral daily  senna 2 Tablet(s) Oral at bedtime  sodium chloride 0.9%. 1000 milliLiter(s) (90 mL/Hr) IV Continuous <Continuous>      MEDICATIONS  (PRN):  acetaminophen   Tablet .. 650 milliGRAM(s) Oral every 6 hours PRN Temp greater or equal to 38C (100.4F), Moderate Pain (4 - 6)  morphine  - Injectable 2 milliGRAM(s) IV Push every 4 hours PRN air hunger  sodium chloride 0.65% Nasal 1 Spray(s) Both Nostrils two times a day PRN Nasal Congestion      Physical Exam    Neuro :  no focal deficits  Respiratory: CTA B/L  CV: RRR, S1S2, no murmurs,   Abdominal: Soft, NT, ND +BS,  Extremities: No edema, + peripheral pulses    ASSESSMENT    Hypoxemia 2nd to covid pna   transaminitis  prediabetes  h/o appendectomy  cholecystectomy        PLAN    contact and airborne isolation  d/c remdesevir given covid ab positive noted   completed dexamethasone   started pulse steroids for 3 days - 250mg solumedrol bid  cont asa, vit c,    cont albuterol inhaler   pulm f/u  procalcitonin, D-dimer, crp, ldh, ferritin, lactate noted ,    cont tylenol prn,   cont robitussin prn   O2 sat (72% - 94%) hi miguel ángel  O2 via hi miguel ángel  pt low threshold for intubation   xray 3/19/21 with pneumomediastinum  rept cxr with New trace right apical pneumothorax. New mild left apical pneumothorax. Grossly stable small pneumomediastinum.  Soft tissue emphysema at the neck bases bilaterally. Grossly stable bilateral pulmonary infiltrates noted.   cxr 2/24 with No evidence of pneumothorax can be appreciated on the available image. This may be related to patient positioning. Evidence of pneumomediastinum and subcutaneous emphysema in the lower neck is again noted. There are patchy bibasilar infiltrates and elevated right hemidiaphragm noted.  thoracic surg f/u   f/u daily cxr  No thoracic intervention unless pneumothorax worsens and/or pt requires intubation  f/u acute hepatitis panel  lispro ss  cont current meds  cont mgmt as per icu

## 2021-03-27 NOTE — PROGRESS NOTE ADULT - SUBJECTIVE AND OBJECTIVE BOX
Pt is awake, alert, lying in bed, still in ICU. On HFNC     INTERVAL HPI/OVERNIGHT EVENTS:      VITAL SIGNS:  T(F): 98 (03-27-21 @ 12:00)  HR: 69 (03-27-21 @ 12:00)  BP: 135/66 (03-27-21 @ 12:00)  RR: 24 (03-27-21 @ 12:00)  SpO2: 91% (03-27-21 @ 12:00)  Wt(kg): --  I&O's Detail    26 Mar 2021 07:01  -  27 Mar 2021 07:00  --------------------------------------------------------  IN:    IV PiggyBack: 100 mL    Oral Fluid: 1240 mL    sodium chloride 0.9%: 990 mL    Sodium Chloride 0.9% Bolus: 1000 mL  Total IN: 3330 mL    OUT:    Voided (mL): 900 mL  Total OUT: 900 mL    Total NET: 2430 mL      27 Mar 2021 07:01  -  27 Mar 2021 12:42  --------------------------------------------------------  IN:    Oral Fluid: 740 mL  Total IN: 740 mL    OUT:  Total OUT: 0 mL    Total NET: 740 mL              REVIEW OF SYSTEMS:    CONSTITUTIONAL:  No fevers, chills, sweats    HEENT:  Eyes:  No diplopia or blurred vision. ENT:  No earache, sore throat or runny nose.    CARDIOVASCULAR:  No pressure, squeezing, tightness, or heaviness about the chest; no palpitations.    RESPIRATORY:  Per HPI    GASTROINTESTINAL:  No abdominal pain, nausea, vomiting or diarrhea.    GENITOURINARY:  No dysuria, frequency or urgency.    NEUROLOGIC:  No paresthesias, fasciculations, seizures or weakness.    PSYCHIATRIC:  No disorder of thought or mood.      PHYSICAL EXAM:    Constitutional: Well developed and nourished  Eyes:Perrla  ENMT: normal  Neck:supple  Respiratory: good air entry  Cardiovascular: S1 S2 regular  Gastrointestinal: Soft, Non tender  Extremities: No edema  Vascular:normal  Neurological:Awake, alert,Ox3  Musculoskeletal:Normal      MEDICATIONS  (STANDING):  ALBUTerol    90 MICROgram(s) HFA Inhaler 2 Puff(s) Inhalation every 6 hours  ALPRAZolam 0.25 milliGRAM(s) Oral two times a day  ascorbic acid 1000 milliGRAM(s) Oral daily  aspirin enteric coated 81 milliGRAM(s) Oral daily  chlorhexidine 2% Cloths 1 Application(s) Topical <User Schedule>  enoxaparin Injectable 40 milliGRAM(s) SubCutaneous daily  insulin lispro (ADMELOG) corrective regimen sliding scale   SubCutaneous three times a day before meals  lactulose Syrup 15 Gram(s) Oral at bedtime  methylPREDNISolone sodium succinate IVPB 250 milliGRAM(s) IV Intermittent two times a day  pantoprazole    Tablet 40 milliGRAM(s) Oral before breakfast  polyethylene glycol 3350 17 Gram(s) Oral daily  senna 2 Tablet(s) Oral at bedtime  sodium chloride 0.9%. 1000 milliLiter(s) (90 mL/Hr) IV Continuous <Continuous>    MEDICATIONS  (PRN):  acetaminophen   Tablet .. 650 milliGRAM(s) Oral every 6 hours PRN Temp greater or equal to 38C (100.4F), Moderate Pain (4 - 6)  morphine  - Injectable 2 milliGRAM(s) IV Push every 4 hours PRN air hunger  sodium chloride 0.65% Nasal 1 Spray(s) Both Nostrils two times a day PRN Nasal Congestion      Allergies    No Known Allergies    Intolerances        LABS:                        14.6   19.23 )-----------( 401      ( 27 Mar 2021 06:40 )             43.6     03-27    137  |  106  |  24<H>  ----------------------------<  110<H>  4.5   |  20<L>  |  0.69    Ca    8.4      27 Mar 2021 06:40  Phos  3.0     03-27  Mg     2.4     03-27    TPro  6.5  /  Alb  2.1<L>  /  TBili  0.7  /  DBili  x   /  AST  32  /  ALT  103<H>  /  AlkPhos  83  03-27              CAPILLARY BLOOD GLUCOSE      POCT Blood Glucose.: 108 mg/dL (27 Mar 2021 11:27)  POCT Blood Glucose.: 118 mg/dL (27 Mar 2021 06:36)  POCT Blood Glucose.: 118 mg/dL (26 Mar 2021 22:30)  POCT Blood Glucose.: 143 mg/dL (26 Mar 2021 16:36)    pro-bnp -- 03-25 @ 05:41     d-dimer 242  03-25 @ 05:41      RADIOLOGY & ADDITIONAL TESTS:    CXR:    < from: Xray Chest 1 View- PORTABLE-Urgent (Xray Chest 1 View- PORTABLE-Urgent .) (03.26.21 @ 09:18) >  IMPRESSION:  Bilateral airspace opacities without significant change. Study limited due to rotation. No gross pneumothorax.    Heart size cannot be accurately assessed in this projection.    < end of copied text >  Ct scan chest:    ekg;    echo:

## 2021-03-27 NOTE — PROGRESS NOTE ADULT - ASSESSMENT
ASSESSMENT AND PLAN:  55M, no PMH, PSH appy and moody 1990s presented 3/14 with x9 days worsening cough, subjective fevers, and SOB, with x2-3 days dysuria and central, non-radiating, constant CP. Admitted to [facility] for acute hypoxic respiratory failure s/t covid pneumonia, ICU consulted for increasing work of breathing on 15 lpm NRM.     1. Acute hypoxic respiratory failure  2. Covid pneumonia  3. Transaminitis  4. Prediabetes  5. Abnormal TSH    =================== Neuro============================  Alert and oriented x 3   on hf/nr , anxious      ================= Cardiovascular==========================  Hypertension  resolved  - Initially, noted to have multiple elevated BP readings  -Continue to monitor, may consider low dose acei/arb if hypertensive     TACHYCARDIA:  pT HR in 120s   2/2 hypoxia and anxiety  will keep monitoring   xanax prn      ================- Pulm=================================  Acute hypoxic respiratory failure: secondary to covid pneumonia    -On HFNC + NRB 15L   -D-dimer initially elevated on presentation, now wnl  -Remdesivir was discontinued due to positive antibodies   - add albuterol prn   -procalcitonin wnl, d-dimer in 200s  -started pulse steroids for 3 days - 250mg solumedrol bid- day 2  -Encourage use of incentive spirometry  -OOB/PT  -xanax for anxiousness, morphine 1mg q 4 prn for air hunger    #Pneumothorax and pneumomediastinum:  xray chest : pt has Grossly stable trace right apical pneumothorax. Grossly stable mild left apical pneumothorax. Stable small pneumomediastinum. Soft tissue emphysema at the neck bases again noted. Stable patchy bilateral pulmonary infiltrates.  thoracic surgery consulted recommended no intervention at this time, just observation for now  xray monitoring daily    ==================ID===================================  Covid pneumonia  -mild leukocytosis  -resolving  -remains afebrile  -plan as above     ================= Nephro================================  -No issues   -voiding without issue  -monitor lytes, SCr   -monitor I/Os    =================GI====================================  Transaminitis:   likely secondary to covid   - and  on presentation  -Improving-  -continue to monitor,  -  hepatitis panel -ve    ================ Heme==================================  Elevated d-dimer: likely secondary to covid  -d-dimer 423 on presentation, now wnl  -continue prophylactic Lovenox 40 mg QD    =================Endocrine===============================  Prediabetes:    -a1c  5.8  -BS controlled  -continue HSS  -monitor FS while on steroids    Abnormal TSH:  -TSH level noted 0.26,   -TSH 0.37 and   -f/u Free T4      ================= Skin/Catheters============================  No rashes. Peripheral IV lines.     - =================Prophylaxis =============================  Lovenox for DVT proph  Protonix for  GI proph    ==================GOC==================================   FULL CODE

## 2021-03-27 NOTE — PROGRESS NOTE ADULT - SUBJECTIVE AND OBJECTIVE BOX
Patient is a 55y old  Male who presents with a chief complaint of SOB (26 Mar 2021 13:58)    INTERVAL HISTORY/ OVERNIGHT EVENTS: Patient was examined while he was lying in bed, Aox3, responding to questions/commands, in NAD while on HFNC/ NRB. No acute overnight events, and PTX continues to be stable on daily CXR.     PRESSORS: [ ] YES [x ] NO  WHICH:    ANTIBIOTICS:                  DATE STARTED:  ANTIBIOTICS:                  DATE STARTED:  ANTIBIOTICS:                  DATE STARTED:    Antimicrobial:    Cardiovascular:    Pulmonary:  ALBUTerol    90 MICROgram(s) HFA Inhaler 2 Puff(s) Inhalation every 6 hours    Hematalogic:  aspirin enteric coated 81 milliGRAM(s) Oral daily  enoxaparin Injectable 40 milliGRAM(s) SubCutaneous daily    Other:  acetaminophen   Tablet .. 650 milliGRAM(s) Oral every 6 hours PRN  ALPRAZolam 0.25 milliGRAM(s) Oral two times a day  ascorbic acid 1000 milliGRAM(s) Oral daily  chlorhexidine 2% Cloths 1 Application(s) Topical <User Schedule>  insulin lispro (ADMELOG) corrective regimen sliding scale   SubCutaneous three times a day before meals  lactulose Syrup 15 Gram(s) Oral at bedtime  methylPREDNISolone sodium succinate IVPB 250 milliGRAM(s) IV Intermittent two times a day  morphine  - Injectable 2 milliGRAM(s) IV Push every 4 hours PRN  pantoprazole    Tablet 40 milliGRAM(s) Oral before breakfast  polyethylene glycol 3350 17 Gram(s) Oral daily  senna 2 Tablet(s) Oral at bedtime  sodium chloride 0.65% Nasal 1 Spray(s) Both Nostrils two times a day PRN  sodium chloride 0.9%. 1000 milliLiter(s) IV Continuous <Continuous>    acetaminophen   Tablet .. 650 milliGRAM(s) Oral every 6 hours PRN  ALBUTerol    90 MICROgram(s) HFA Inhaler 2 Puff(s) Inhalation every 6 hours  ALPRAZolam 0.25 milliGRAM(s) Oral two times a day  ascorbic acid 1000 milliGRAM(s) Oral daily  aspirin enteric coated 81 milliGRAM(s) Oral daily  chlorhexidine 2% Cloths 1 Application(s) Topical <User Schedule>  enoxaparin Injectable 40 milliGRAM(s) SubCutaneous daily  insulin lispro (ADMELOG) corrective regimen sliding scale   SubCutaneous three times a day before meals  lactulose Syrup 15 Gram(s) Oral at bedtime  methylPREDNISolone sodium succinate IVPB 250 milliGRAM(s) IV Intermittent two times a day  morphine  - Injectable 2 milliGRAM(s) IV Push every 4 hours PRN  pantoprazole    Tablet 40 milliGRAM(s) Oral before breakfast  polyethylene glycol 3350 17 Gram(s) Oral daily  senna 2 Tablet(s) Oral at bedtime  sodium chloride 0.65% Nasal 1 Spray(s) Both Nostrils two times a day PRN  sodium chloride 0.9%. 1000 milliLiter(s) IV Continuous <Continuous>    Drug Dosing Weight  Height (cm): 167.6 (14 Mar 2021 12:03)  Weight (kg): 83.869 (14 Mar 2021 12:03)  BMI (kg/m2): 29.9 (14 Mar 2021 12:03)  BSA (m2): 1.93 (14 Mar 2021 12:03)    CENTRAL LINE: [ ] YES [x ] NO  LOCATION:     RIVERA: [ ] YES [x ] NO      A-LINE:  [ ] YES [x ] NO  LOCATION:         ICU Vital Signs Last 24 Hrs  T(C): 36.2 (27 Mar 2021 00:15), Max: 36.2 (26 Mar 2021 20:10)  T(F): 97.2 (27 Mar 2021 00:15), Max: 97.2 (27 Mar 2021 00:15)  HR: 72 (27 Mar 2021 02:00) (69 - 121)  BP: 140/75 (27 Mar 2021 02:00) (124/54 - 155/82)  BP(mean): 90 (27 Mar 2021 02:00) (69 - 133)  ABP: --  ABP(mean): --  RR: 28 (27 Mar 2021 02:00) (21 - 41)  SpO2: 92% (27 Mar 2021 02:00) (72% - 94%)            03-25 @ 07:01  -  03-26 @ 07:00  --------------------------------------------------------  IN: 1500 mL / OUT: 550 mL / NET: 950 mL            PHYSICAL EXAM:    GENERAL: well-groomed, well-developed, on hf/nr, anxious   HEAD:  Atraumatic, Normocephalic  EYES: EOMI, PERRLA, conjunctiva and sclera clear  ENMT: No tonsillar erythema, exudates, or enlargement; Moist mucous membranes, Good dentition, No lesions  NECK: Supple, normal appearance, No JVD; Normal thyroid; Trachea midline  NERVOUS SYSTEM:  Alert & Oriented X3, Motor Strength 5/5 B/L upper and lower extremities;  CHEST/LUNG: DECREASED BREATH SOUNDS bilaterally, No rales, rhonchi, wheezing. On HFNC 50/100 + NRB 15 L  HEART: s1,s2  No murmurs, rubs, or gallops  ABDOMEN: Soft, Nontender, Nondistended;  EXTREMITIES:  2+ Peripheral Pulses, No clubbing, cyanosis, or edema  LYMPH: No lymphadenopathy noted  SKIN: No rashes or lesions; Good capillary refill        LABS:                        15.8   12.00 )-----------( 377      ( 26 Mar 2021 06:33 )             46.6     03-26    134<L>  |  101  |  28<H>  ----------------------------<  129<H>  4.8   |  22  |  0.71    Ca    8.8      26 Mar 2021 06:33  Phos  3.5     03-26  Mg     2.3     03-26    TPro  6.9  /  Alb  2.3<L>  /  TBili  1.1  /  DBili  x   /  AST  33  /  ALT  122<H>  /  AlkPhos  83  03-26        CAPILLARY BLOOD GLUCOSE      POCT Blood Glucose.: 118 mg/dL (26 Mar 2021 22:30)  POCT Blood Glucose.: 143 mg/dL (26 Mar 2021 16:36)  POCT Blood Glucose.: 115 mg/dL (26 Mar 2021 11:24)  POCT Blood Glucose.: 133 mg/dL (26 Mar 2021 06:29)        RADIOLOGY & ADDITIONAL STUDIES REVIEWED:  ***    [ ]GOALS OF CARE DISCUSSION WITH PATIENT/FAMILY/PROXY:    CRITICAL CARE TIME SPENT: 35 minutes

## 2021-03-28 LAB
ALBUMIN SERPL ELPH-MCNC: 2.3 G/DL — LOW (ref 3.5–5)
ALP SERPL-CCNC: 84 U/L — SIGNIFICANT CHANGE UP (ref 40–120)
ALT FLD-CCNC: 92 U/L DA — HIGH (ref 10–60)
ANION GAP SERPL CALC-SCNC: 8 MMOL/L — SIGNIFICANT CHANGE UP (ref 5–17)
AST SERPL-CCNC: 28 U/L — SIGNIFICANT CHANGE UP (ref 10–40)
BILIRUB SERPL-MCNC: 0.8 MG/DL — SIGNIFICANT CHANGE UP (ref 0.2–1.2)
BUN SERPL-MCNC: 25 MG/DL — HIGH (ref 7–18)
CALCIUM SERPL-MCNC: 8.5 MG/DL — SIGNIFICANT CHANGE UP (ref 8.4–10.5)
CHLORIDE SERPL-SCNC: 107 MMOL/L — SIGNIFICANT CHANGE UP (ref 96–108)
CO2 SERPL-SCNC: 24 MMOL/L — SIGNIFICANT CHANGE UP (ref 22–31)
CREAT SERPL-MCNC: 0.71 MG/DL — SIGNIFICANT CHANGE UP (ref 0.5–1.3)
D DIMER BLD IA.RAPID-MCNC: 224 NG/ML DDU — SIGNIFICANT CHANGE UP
FERRITIN SERPL-MCNC: 2181 NG/ML — HIGH (ref 30–400)
GLUCOSE BLDC GLUCOMTR-MCNC: 111 MG/DL — HIGH (ref 70–99)
GLUCOSE BLDC GLUCOMTR-MCNC: 98 MG/DL — SIGNIFICANT CHANGE UP (ref 70–99)
GLUCOSE SERPL-MCNC: 115 MG/DL — HIGH (ref 70–99)
HCT VFR BLD CALC: 43.5 % — SIGNIFICANT CHANGE UP (ref 39–50)
HGB BLD-MCNC: 14.6 G/DL — SIGNIFICANT CHANGE UP (ref 13–17)
MAGNESIUM SERPL-MCNC: 2.5 MG/DL — SIGNIFICANT CHANGE UP (ref 1.6–2.6)
MCHC RBC-ENTMCNC: 31.3 PG — SIGNIFICANT CHANGE UP (ref 27–34)
MCHC RBC-ENTMCNC: 33.6 GM/DL — SIGNIFICANT CHANGE UP (ref 32–36)
MCV RBC AUTO: 93.1 FL — SIGNIFICANT CHANGE UP (ref 80–100)
NRBC # BLD: 0 /100 WBCS — SIGNIFICANT CHANGE UP (ref 0–0)
PHOSPHATE SERPL-MCNC: 3.5 MG/DL — SIGNIFICANT CHANGE UP (ref 2.5–4.5)
PLATELET # BLD AUTO: 393 K/UL — SIGNIFICANT CHANGE UP (ref 150–400)
POTASSIUM SERPL-MCNC: 4.4 MMOL/L — SIGNIFICANT CHANGE UP (ref 3.5–5.3)
POTASSIUM SERPL-SCNC: 4.4 MMOL/L — SIGNIFICANT CHANGE UP (ref 3.5–5.3)
PROCALCITONIN SERPL-MCNC: 0.06 NG/ML — SIGNIFICANT CHANGE UP (ref 0.02–0.1)
PROCALCITONIN SERPL-MCNC: 0.09 NG/ML — SIGNIFICANT CHANGE UP (ref 0.02–0.1)
PROT SERPL-MCNC: 6.4 G/DL — SIGNIFICANT CHANGE UP (ref 6–8.3)
RBC # BLD: 4.67 M/UL — SIGNIFICANT CHANGE UP (ref 4.2–5.8)
RBC # FLD: 12.7 % — SIGNIFICANT CHANGE UP (ref 10.3–14.5)
SODIUM SERPL-SCNC: 139 MMOL/L — SIGNIFICANT CHANGE UP (ref 135–145)
WBC # BLD: 17.33 K/UL — HIGH (ref 3.8–10.5)
WBC # FLD AUTO: 17.33 K/UL — HIGH (ref 3.8–10.5)

## 2021-03-28 PROCEDURE — 71045 X-RAY EXAM CHEST 1 VIEW: CPT | Mod: 26

## 2021-03-28 RX ADMIN — PANTOPRAZOLE SODIUM 40 MILLIGRAM(S): 20 TABLET, DELAYED RELEASE ORAL at 06:23

## 2021-03-28 RX ADMIN — CHLORHEXIDINE GLUCONATE 1 APPLICATION(S): 213 SOLUTION TOPICAL at 06:23

## 2021-03-28 RX ADMIN — Medication 0.25 MILLIGRAM(S): at 06:23

## 2021-03-28 RX ADMIN — Medication 40 MILLIGRAM(S): at 06:23

## 2021-03-28 RX ADMIN — ENOXAPARIN SODIUM 40 MILLIGRAM(S): 100 INJECTION SUBCUTANEOUS at 11:02

## 2021-03-28 RX ADMIN — Medication 0.25 MILLIGRAM(S): at 17:53

## 2021-03-28 RX ADMIN — Medication 1000 MILLIGRAM(S): at 11:01

## 2021-03-28 RX ADMIN — ALBUTEROL 2 PUFF(S): 90 AEROSOL, METERED ORAL at 21:17

## 2021-03-28 RX ADMIN — ALBUTEROL 2 PUFF(S): 90 AEROSOL, METERED ORAL at 02:45

## 2021-03-28 RX ADMIN — Medication 81 MILLIGRAM(S): at 11:01

## 2021-03-28 RX ADMIN — SENNA PLUS 2 TABLET(S): 8.6 TABLET ORAL at 21:17

## 2021-03-28 RX ADMIN — ALBUTEROL 2 PUFF(S): 90 AEROSOL, METERED ORAL at 15:38

## 2021-03-28 RX ADMIN — POLYETHYLENE GLYCOL 3350 17 GRAM(S): 17 POWDER, FOR SOLUTION ORAL at 11:01

## 2021-03-28 RX ADMIN — Medication 40 MILLIGRAM(S): at 17:53

## 2021-03-28 RX ADMIN — ALBUTEROL 2 PUFF(S): 90 AEROSOL, METERED ORAL at 09:26

## 2021-03-28 RX ADMIN — BENZOCAINE AND MENTHOL 1 LOZENGE: 5; 1 LIQUID ORAL at 11:02

## 2021-03-28 NOTE — PROGRESS NOTE ADULT - SUBJECTIVE AND OBJECTIVE BOX
Patient is a 55y old  Male who presents with a chief complaint of SOB (28 Mar 2021 01:04)    pt seen in icu [ x ], reg med floor [  ], bed [ x ], chair at bedside [   ], a+o x3 [ x ], lethargic [  ],    nad [ x ]      Allergies    No Known Allergies        Vitals    T(F): 96.6 (03-28-21 @ 04:00), Max: 98 (03-27-21 @ 12:00)  HR: 98 (03-28-21 @ 06:00) (63 - 103)  BP: 156/73 (03-28-21 @ 06:00) (115/60 - 156/73)  RR: 23 (03-28-21 @ 06:00) (23 - 39)  SpO2: 90% (03-28-21 @ 06:00) (89% - 96%)  Wt(kg): --  CAPILLARY BLOOD GLUCOSE      POCT Blood Glucose.: 98 mg/dL (28 Mar 2021 06:14)      Labs                          14.6   17.33 )-----------( 393      ( 28 Mar 2021 03:54 )             43.5       03-28    139  |  107  |  25<H>  ----------------------------<  115<H>  4.4   |  24  |  0.71    Ca    8.5      28 Mar 2021 03:54  Phos  3.5     03-28  Mg     2.5     03-28    TPro  6.4  /  Alb  2.3<L>  /  TBili  0.8  /  DBili  x   /  AST  28  /  ALT  92<H>  /  AlkPhos  84  03-28            .Urine Clean Catch (Midstream)  03-15 @ 00:52   No growth  --  --          Radiology Results      Meds    MEDICATIONS  (STANDING):  ALBUTerol    90 MICROgram(s) HFA Inhaler 2 Puff(s) Inhalation every 6 hours  ALPRAZolam 0.25 milliGRAM(s) Oral two times a day  ascorbic acid 1000 milliGRAM(s) Oral daily  aspirin enteric coated 81 milliGRAM(s) Oral daily  benzocaine 15 mG/menthol 3.6 mG (Sugar-Free) Lozenge 1 Lozenge Oral daily  chlorhexidine 2% Cloths 1 Application(s) Topical <User Schedule>  enoxaparin Injectable 40 milliGRAM(s) SubCutaneous daily  insulin lispro (ADMELOG) corrective regimen sliding scale   SubCutaneous three times a day before meals  methylPREDNISolone sodium succinate Injectable 40 milliGRAM(s) IV Push every 12 hours  pantoprazole    Tablet 40 milliGRAM(s) Oral before breakfast  polyethylene glycol 3350 17 Gram(s) Oral daily  senna 2 Tablet(s) Oral at bedtime  sodium chloride 0.9%. 1000 milliLiter(s) (90 mL/Hr) IV Continuous <Continuous>      MEDICATIONS  (PRN):  acetaminophen   Tablet .. 650 milliGRAM(s) Oral every 6 hours PRN Temp greater or equal to 38C (100.4F), Moderate Pain (4 - 6)  morphine  - Injectable 2 milliGRAM(s) IV Push every 4 hours PRN air hunger  sodium chloride 0.65% Nasal 1 Spray(s) Both Nostrils two times a day PRN Nasal Congestion      Physical Exam    Neuro :  no focal deficits  Respiratory: CTA B/L  CV: RRR, S1S2, no murmurs,   Abdominal: Soft, NT, ND +BS,  Extremities: No edema, + peripheral pulses    ASSESSMENT    Hypoxemia 2nd to covid pna   transaminitis  prediabetes  h/o appendectomy  cholecystectomy        PLAN    contact and airborne isolation  d/c remdesevir given covid ab positive noted   completed dexamethasone   started pulse steroids for 3 days - 250mg solumedrol bid  cont asa, vit c,    cont albuterol inhaler   pulm f/u  procalcitonin, D-dimer, crp, ldh, ferritin, lactate noted ,    cont tylenol prn,   cont robitussin prn   O2 sat (72% - 94%) hi miguel ángel  O2 via hi miguel ángel  pt low threshold for intubation   xray 3/19/21 with pneumomediastinum  rept cxr with New trace right apical pneumothorax. New mild left apical pneumothorax. Grossly stable small pneumomediastinum.  Soft tissue emphysema at the neck bases bilaterally. Grossly stable bilateral pulmonary infiltrates noted.   cxr 2/24 with No evidence of pneumothorax can be appreciated on the available image. This may be related to patient positioning. Evidence of pneumomediastinum and subcutaneous emphysema in the lower neck is again noted. There are patchy bibasilar infiltrates and elevated right hemidiaphragm noted.  thoracic surg f/u   f/u daily cxr  No thoracic intervention unless pneumothorax worsens and/or pt requires intubation  f/u acute hepatitis panel  lispro ss  cont current meds  cont mgmt as per icu     Patient is a 55y old  Male who presents with a chief complaint of SOB (28 Mar 2021 01:04)    pt seen in icu [ x ], reg med floor [  ], bed [ x ], chair at bedside [   ], a+o x3 [ x ], lethargic [  ],    nad [ x ]      Allergies    No Known Allergies        Vitals    T(F): 96.6 (03-28-21 @ 04:00), Max: 98 (03-27-21 @ 12:00)  HR: 98 (03-28-21 @ 06:00) (63 - 103)  BP: 156/73 (03-28-21 @ 06:00) (115/60 - 156/73)  RR: 23 (03-28-21 @ 06:00) (23 - 39)  SpO2: 90% (03-28-21 @ 06:00) (89% - 96%)  Wt(kg): --  CAPILLARY BLOOD GLUCOSE      POCT Blood Glucose.: 98 mg/dL (28 Mar 2021 06:14)      Labs                          14.6   17.33 )-----------( 393      ( 28 Mar 2021 03:54 )             43.5       03-28    139  |  107  |  25<H>  ----------------------------<  115<H>  4.4   |  24  |  0.71    Ca    8.5      28 Mar 2021 03:54  Phos  3.5     03-28  Mg     2.5     03-28    TPro  6.4  /  Alb  2.3<L>  /  TBili  0.8  /  DBili  x   /  AST  28  /  ALT  92<H>  /  AlkPhos  84  03-28            .Urine Clean Catch (Midstream)  03-15 @ 00:52   No growth  --  --          Radiology Results      Meds    MEDICATIONS  (STANDING):  ALBUTerol    90 MICROgram(s) HFA Inhaler 2 Puff(s) Inhalation every 6 hours  ALPRAZolam 0.25 milliGRAM(s) Oral two times a day  ascorbic acid 1000 milliGRAM(s) Oral daily  aspirin enteric coated 81 milliGRAM(s) Oral daily  benzocaine 15 mG/menthol 3.6 mG (Sugar-Free) Lozenge 1 Lozenge Oral daily  chlorhexidine 2% Cloths 1 Application(s) Topical <User Schedule>  enoxaparin Injectable 40 milliGRAM(s) SubCutaneous daily  insulin lispro (ADMELOG) corrective regimen sliding scale   SubCutaneous three times a day before meals  methylPREDNISolone sodium succinate Injectable 40 milliGRAM(s) IV Push every 12 hours  pantoprazole    Tablet 40 milliGRAM(s) Oral before breakfast  polyethylene glycol 3350 17 Gram(s) Oral daily  senna 2 Tablet(s) Oral at bedtime  sodium chloride 0.9%. 1000 milliLiter(s) (90 mL/Hr) IV Continuous <Continuous>      MEDICATIONS  (PRN):  acetaminophen   Tablet .. 650 milliGRAM(s) Oral every 6 hours PRN Temp greater or equal to 38C (100.4F), Moderate Pain (4 - 6)  morphine  - Injectable 2 milliGRAM(s) IV Push every 4 hours PRN air hunger  sodium chloride 0.65% Nasal 1 Spray(s) Both Nostrils two times a day PRN Nasal Congestion      Physical Exam    Neuro :  no focal deficits  Respiratory: CTA B/L  CV: RRR, S1S2, no murmurs,   Abdominal: Soft, NT, ND +BS,  Extremities: No edema, + peripheral pulses    ASSESSMENT    Hypoxemia 2nd to covid pna   transaminitis  prediabetes  h/o appendectomy  cholecystectomy        PLAN    contact and airborne isolation  d/c remdesevir given covid ab positive noted   completed dexamethasone   started pulse steroids for 3 days - 250mg solumedrol bid  cont asa, vit c,    cont albuterol inhaler   pulm f/u  procalcitonin, D-dimer, crp, ldh, ferritin, lactate noted ,    cont tylenol prn,   cont robitussin prn   O2 sat (89% - 96%) hi miguel ángel and nrb15L  O2 via hi miguel ángel and nrbm 15L  pt low threshold for intubation   xray 3/19/21 with pneumomediastinum  rept cxr with New trace right apical pneumothorax. New mild left apical pneumothorax. Grossly stable small pneumomediastinum.  Soft tissue emphysema at the neck bases bilaterally. Grossly stable bilateral pulmonary infiltrates noted.   cxr 2/24 with No evidence of pneumothorax can be appreciated on the available image. This may be related to patient positioning. Evidence of pneumomediastinum and subcutaneous emphysema in the lower neck is again noted. There are patchy bibasilar infiltrates and elevated right hemidiaphragm noted.  thoracic surg f/u   f/u daily cxr  No thoracic intervention unless pneumothorax worsens and/or pt requires intubation  f/u acute hepatitis panel  lispro ss  cont current meds  cont mgmt as per icu

## 2021-03-28 NOTE — PROGRESS NOTE ADULT - ASSESSMENT
ASSESSMENT AND PLAN:  55M, no PMH, PSH appy and moody 1990s presented 3/14 with x9 days worsening cough, subjective fevers, and SOB, with x2-3 days dysuria and central, non-radiating, constant CP. Admitted to [facility] for acute hypoxic respiratory failure s/t covid pneumonia, ICU consulted for increasing work of breathing on 15 lpm NRM.     1. Acute hypoxic respiratory failure  2. Covid pneumonia  3. Transaminitis  4. Prediabetes  5. Abnormal TSH    =================== Neuro============================  Alert and oriented x 3   on hf/nr , anxious sat 80-90s      ================= Cardiovascular==========================  Hypertension  resolved  - Initially, noted to have multiple elevated BP readings  -Continue to monitor, may consider low dose acei/arb if hypertensive     TACHYCARDIA:  pT HR in 120s   2/2 hypoxia and anxiety  will keep monitoring   xanax prn      ================- Pulm=================================  Acute hypoxic respiratory failure: secondary to covid pneumonia    -On HFNC + NRB 15L   -D-dimer initially elevated on presentation, now wnl  -Remdesivir was discontinued due to positive antibodies   - add albuterol prn   -procalcitonin wnl, d-dimer in 200s  -started pulse steroids for 3 days - 250mg solumedrol bid- day 2  -Encourage use of incentive spirometry  -OOB/PT  -xanax for anxiousness, morphine 1mg q 4 prn for air hunger    #Pneumothorax and pneumomediastinum:  xray chest :   thoracic surgery consulted recommended no intervention at this time, just observation for now  xray monitoring daily    ==================ID===================================  Covid pneumonia  - leukocytosis  -remains afebrile  -plan as above     ================= Nephro================================  -No issues   -voiding without issue  -monitor lytes, SCr   -monitor I/Os    =================GI====================================  Transaminitis:   likely secondary to covid   - and  on presentation  -Improving-  -continue to monitor,  -  hepatitis panel -ve    ================ Heme==================================  Elevated d-dimer: likely secondary to covid  -d-dimer 423 on presentation, now wnl  -continue prophylactic Lovenox 40 mg QD    =================Endocrine===============================  Prediabetes:    -a1c  5.8  -BS controlled  -continue HSS  -monitor FS while on steroids    Abnormal TSH:  -TSH level noted 0.26,   -TSH 0.37 and   -f/u Free T4      ================= Skin/Catheters============================  No rashes. Peripheral IV lines.     - =================Prophylaxis =============================  Lovenox for DVT proph  Protonix for  GI proph    ==================GOC==================================   FULL CODE ASSESSMENT AND PLAN:  55M, no PMH, PSH appy and moody 1990s presented 3/14 with x9 days worsening cough, subjective fevers, and SOB, with x2-3 days dysuria and central, non-radiating, constant CP. Admitted to [facility] for acute hypoxic respiratory failure s/t covid pneumonia, ICU consulted for increasing work of breathing on 15 lpm NRM.     1. Acute hypoxic respiratory failure  2. Covid pneumonia  3. Transaminitis  4. Prediabetes  5. Abnormal TSH    =================== Neuro============================  Alert and oriented x 3   on hf/nr , anxious sat 80-90s      ================= Cardiovascular==========================  Hypertension  resolved  - Initially, noted to have multiple elevated BP readings  -Continue to monitor, may consider low dose acei/arb if hypertensive     TACHYCARDIA:  pT HR in 120s   2/2 hypoxia and anxiety  will keep monitoring   xanax prn      ================- Pulm=================================  Acute hypoxic respiratory failure: secondary to covid pneumonia    -On HFNC + NRB 15L   -D-dimer initially elevated on presentation, now wnl  -Remdesivir was discontinued due to positive antibodies   - add albuterol prn   -procalcitonin wnl, d-dimer in 200s  -started pulse steroids for 3 days - 250mg solumedrol bid- completed3/27  -started solumedrol 40 bid   -Encourage use of incentive spirometry  -OOB/PT  -xanax for anxiousness, morphine 1mg q 4 prn for air hunger    #Pneumothorax and pneumomediastinum:  xray chest :   thoracic surgery consulted recommended no intervention at this time, just observation for now  xray monitoring daily    ==================ID===================================  Covid pneumonia  - leukocytosis  -remains afebrile  -plan as above     ================= Nephro================================  -No issues   -voiding without issue  -monitor lytes, SCr   -monitor I/Os    =================GI====================================  Transaminitis:   likely secondary to covid   - and  on presentation  -Improving-  -continue to monitor,  -  hepatitis panel -ve    ================ Heme==================================  Elevated d-dimer: likely secondary to covid  -d-dimer 423 on presentation, now wnl  -continue prophylactic Lovenox 40 mg QD    =================Endocrine===============================  Prediabetes:    -a1c  5.8  -BS controlled  -continue HSS  -monitor FS while on steroids    Abnormal TSH:  -TSH level noted 0.26,   -TSH 0.37 and   -f/u Free T4      ================= Skin/Catheters============================  No rashes. Peripheral IV lines.     - =================Prophylaxis =============================  Lovenox for DVT proph  Protonix for  GI proph    ==================GOC==================================   FULL CODE

## 2021-03-28 NOTE — PROGRESS NOTE ADULT - SUBJECTIVE AND OBJECTIVE BOX
INTERVAL HPI/OVERNIGHT EVENTS: Pt had no acute event overnight    Pulmonary:  ALBUTerol    90 MICROgram(s) HFA Inhaler 2 Puff(s) Inhalation every 6 hours    Hematalogic:  aspirin enteric coated 81 milliGRAM(s) Oral daily  enoxaparin Injectable 40 milliGRAM(s) SubCutaneous daily    Other:  acetaminophen   Tablet .. 650 milliGRAM(s) Oral every 6 hours PRN  ALPRAZolam 0.25 milliGRAM(s) Oral two times a day  ascorbic acid 1000 milliGRAM(s) Oral daily  benzocaine 15 mG/menthol 3.6 mG (Sugar-Free) Lozenge 1 Lozenge Oral daily  chlorhexidine 2% Cloths 1 Application(s) Topical <User Schedule>  insulin lispro (ADMELOG) corrective regimen sliding scale   SubCutaneous three times a day before meals  methylPREDNISolone sodium succinate Injectable 40 milliGRAM(s) IV Push every 12 hours  methylPREDNISolone sodium succinate IVPB 250 milliGRAM(s) IV Intermittent two times a day  morphine  - Injectable 2 milliGRAM(s) IV Push every 4 hours PRN  pantoprazole    Tablet 40 milliGRAM(s) Oral before breakfast  polyethylene glycol 3350 17 Gram(s) Oral daily  senna 2 Tablet(s) Oral at bedtime  sodium chloride 0.65% Nasal 1 Spray(s) Both Nostrils two times a day PRN  sodium chloride 0.9%. 1000 milliLiter(s) IV Continuous <Continuous>    acetaminophen   Tablet .. 650 milliGRAM(s) Oral every 6 hours PRN  ALBUTerol    90 MICROgram(s) HFA Inhaler 2 Puff(s) Inhalation every 6 hours  ALPRAZolam 0.25 milliGRAM(s) Oral two times a day  ascorbic acid 1000 milliGRAM(s) Oral daily  aspirin enteric coated 81 milliGRAM(s) Oral daily  benzocaine 15 mG/menthol 3.6 mG (Sugar-Free) Lozenge 1 Lozenge Oral daily  chlorhexidine 2% Cloths 1 Application(s) Topical <User Schedule>  enoxaparin Injectable 40 milliGRAM(s) SubCutaneous daily  insulin lispro (ADMELOG) corrective regimen sliding scale   SubCutaneous three times a day before meals  methylPREDNISolone sodium succinate Injectable 40 milliGRAM(s) IV Push every 12 hours  methylPREDNISolone sodium succinate IVPB 250 milliGRAM(s) IV Intermittent two times a day  morphine  - Injectable 2 milliGRAM(s) IV Push every 4 hours PRN  pantoprazole    Tablet 40 milliGRAM(s) Oral before breakfast  polyethylene glycol 3350 17 Gram(s) Oral daily  senna 2 Tablet(s) Oral at bedtime  sodium chloride 0.65% Nasal 1 Spray(s) Both Nostrils two times a day PRN  sodium chloride 0.9%. 1000 milliLiter(s) IV Continuous <Continuous>    Drug Dosing Weight  Height (cm): 167.6 (14 Mar 2021 12:03)  Weight (kg): 83.869 (14 Mar 2021 12:03)  BMI (kg/m2): 29.9 (14 Mar 2021 12:03)  BSA (m2): 1.93 (14 Mar 2021 12:03)    CENTRAL LINE: [ ] YES [x ] NO  LOCATION:   DATE INSERTED:  REMOVE: [ ] YES [ ] NO  EXPLAIN:    RIVERA: [ ] YES [x ] NO    DATE INSERTED:  REMOVE:  [ ] YES [ ] NO  EXPLAIN:    A-LINE:  [ ] YES [x ] NO  LOCATION:   DATE INSERTED:  REMOVE:  [ ] YES [ ] NO  EXPLAIN:    PMH -reviewed admission note, no change since admission    ICU Vital Signs Last 24 Hrs  T(C): 36.2 (28 Mar 2021 00:00), Max: 36.7 (27 Mar 2021 12:00)  T(F): 97.1 (28 Mar 2021 00:00), Max: 98 (27 Mar 2021 12:00)  HR: 76 (28 Mar 2021 00:09) (63 - 103)  BP: 128/62 (28 Mar 2021 00:00) (115/60 - 156/66)  BP(mean): 77 (28 Mar 2021 00:00) (72 - 93)  ABP: --  ABP(mean): --  RR: 26 (28 Mar 2021 00:00) (24 - 39)  SpO2: 94% (28 Mar 2021 00:09) (89% - 96%)            03-26 @ 07:01  -  03-27 @ 07:00  --------------------------------------------------------  IN: 3330 mL / OUT: 900 mL / NET: 2430 mL            PHYSICAL EXAM:    GENERAL: well-groomed, well-developed, on hf/nr, anxious   HEAD:  Atraumatic, Normocephalic  EYES: EOMI, PERRLA, conjunctiva and sclera clear  ENMT: No tonsillar erythema, exudates, or enlargement; Moist mucous membranes, Good dentition, No lesions  NECK: Supple, normal appearance, No JVD; Normal thyroid; Trachea midline  NERVOUS SYSTEM:  Alert & Oriented X3, Motor Strength 5/5 B/L upper and lower extremities;  CHEST/LUNG: DECREASED BREATH SOUNDS bilaterally, No rales, rhonchi, wheezing. On HFNC 50/100 + NRB 15 L  HEART: s1,s2  No murmurs, rubs, or gallops  ABDOMEN: Soft, Nontender, Nondistended;  EXTREMITIES:  2+ Peripheral Pulses, No clubbing, cyanosis, or edema  LYMPH: No lymphadenopathy noted  SKIN: No rashes or lesions; Good capillary refill      LABS:  CBC Full  -  ( 27 Mar 2021 06:40 )  WBC Count : 19.23 K/uL  RBC Count : 4.63 M/uL  Hemoglobin : 14.6 g/dL  Hematocrit : 43.6 %  Platelet Count - Automated : 401 K/uL  Mean Cell Volume : 94.2 fl  Mean Cell Hemoglobin : 31.5 pg  Mean Cell Hemoglobin Concentration : 33.5 gm/dL  Auto Neutrophil # : x  Auto Lymphocyte # : x  Auto Monocyte # : x  Auto Eosinophil # : x  Auto Basophil # : x  Auto Neutrophil % : x  Auto Lymphocyte % : x  Auto Monocyte % : x  Auto Eosinophil % : x  Auto Basophil % : x    03-27    137  |  106  |  24<H>  ----------------------------<  110<H>  4.5   |  20<L>  |  0.69    Ca    8.4      27 Mar 2021 06:40  Phos  3.0     03-27  Mg     2.4     03-27    TPro  6.5  /  Alb  2.1<L>  /  TBili  0.7  /  DBili  x   /  AST  32  /  ALT  103<H>  /  AlkPhos  83  03-27        CRITICAL CARE TIME SPENT: 35 minutes INTERVAL HPI/OVERNIGHT EVENTS: Pt had no acute event overnight  -FiO2 decreased to 90% but patient desaturated.     Pulmonary:  ALBUTerol    90 MICROgram(s) HFA Inhaler 2 Puff(s) Inhalation every 6 hours    Hematalogic:  aspirin enteric coated 81 milliGRAM(s) Oral daily  enoxaparin Injectable 40 milliGRAM(s) SubCutaneous daily    Other:  acetaminophen   Tablet .. 650 milliGRAM(s) Oral every 6 hours PRN  ALPRAZolam 0.25 milliGRAM(s) Oral two times a day  ascorbic acid 1000 milliGRAM(s) Oral daily  benzocaine 15 mG/menthol 3.6 mG (Sugar-Free) Lozenge 1 Lozenge Oral daily  chlorhexidine 2% Cloths 1 Application(s) Topical <User Schedule>  insulin lispro (ADMELOG) corrective regimen sliding scale   SubCutaneous three times a day before meals  methylPREDNISolone sodium succinate Injectable 40 milliGRAM(s) IV Push every 12 hours  methylPREDNISolone sodium succinate IVPB 250 milliGRAM(s) IV Intermittent two times a day  morphine  - Injectable 2 milliGRAM(s) IV Push every 4 hours PRN  pantoprazole    Tablet 40 milliGRAM(s) Oral before breakfast  polyethylene glycol 3350 17 Gram(s) Oral daily  senna 2 Tablet(s) Oral at bedtime  sodium chloride 0.65% Nasal 1 Spray(s) Both Nostrils two times a day PRN  sodium chloride 0.9%. 1000 milliLiter(s) IV Continuous <Continuous>    acetaminophen   Tablet .. 650 milliGRAM(s) Oral every 6 hours PRN  ALBUTerol    90 MICROgram(s) HFA Inhaler 2 Puff(s) Inhalation every 6 hours  ALPRAZolam 0.25 milliGRAM(s) Oral two times a day  ascorbic acid 1000 milliGRAM(s) Oral daily  aspirin enteric coated 81 milliGRAM(s) Oral daily  benzocaine 15 mG/menthol 3.6 mG (Sugar-Free) Lozenge 1 Lozenge Oral daily  chlorhexidine 2% Cloths 1 Application(s) Topical <User Schedule>  enoxaparin Injectable 40 milliGRAM(s) SubCutaneous daily  insulin lispro (ADMELOG) corrective regimen sliding scale   SubCutaneous three times a day before meals  methylPREDNISolone sodium succinate Injectable 40 milliGRAM(s) IV Push every 12 hours  methylPREDNISolone sodium succinate IVPB 250 milliGRAM(s) IV Intermittent two times a day  morphine  - Injectable 2 milliGRAM(s) IV Push every 4 hours PRN  pantoprazole    Tablet 40 milliGRAM(s) Oral before breakfast  polyethylene glycol 3350 17 Gram(s) Oral daily  senna 2 Tablet(s) Oral at bedtime  sodium chloride 0.65% Nasal 1 Spray(s) Both Nostrils two times a day PRN  sodium chloride 0.9%. 1000 milliLiter(s) IV Continuous <Continuous>    Drug Dosing Weight  Height (cm): 167.6 (14 Mar 2021 12:03)  Weight (kg): 83.869 (14 Mar 2021 12:03)  BMI (kg/m2): 29.9 (14 Mar 2021 12:03)  BSA (m2): 1.93 (14 Mar 2021 12:03)    CENTRAL LINE: [ ] YES [x ] NO  LOCATION:   DATE INSERTED:  REMOVE: [ ] YES [ ] NO  EXPLAIN:    RIVERA: [ ] YES [x ] NO    DATE INSERTED:  REMOVE:  [ ] YES [ ] NO  EXPLAIN:    A-LINE:  [ ] YES [x ] NO  LOCATION:   DATE INSERTED:  REMOVE:  [ ] YES [ ] NO  EXPLAIN:    PMH -reviewed admission note, no change since admission    ICU Vital Signs Last 24 Hrs  T(C): 36.2 (28 Mar 2021 00:00), Max: 36.7 (27 Mar 2021 12:00)  T(F): 97.1 (28 Mar 2021 00:00), Max: 98 (27 Mar 2021 12:00)  HR: 76 (28 Mar 2021 00:09) (63 - 103)  BP: 128/62 (28 Mar 2021 00:00) (115/60 - 156/66)  BP(mean): 77 (28 Mar 2021 00:00) (72 - 93)  ABP: --  ABP(mean): --  RR: 26 (28 Mar 2021 00:00) (24 - 39)  SpO2: 94% (28 Mar 2021 00:09) (89% - 96%)            03-26 @ 07:01  -  03-27 @ 07:00  --------------------------------------------------------  IN: 3330 mL / OUT: 900 mL / NET: 2430 mL            PHYSICAL EXAM:    GENERAL: well-groomed, well-developed, on hf/nr, anxious   HEAD:  Atraumatic, Normocephalic  EYES: EOMI, PERRLA, conjunctiva and sclera clear  ENMT: No tonsillar erythema, exudates, or enlargement; Moist mucous membranes, Good dentition, No lesions  NECK: Supple, normal appearance, No JVD; Normal thyroid; Trachea midline  NERVOUS SYSTEM:  Alert & Oriented X3, Motor Strength 5/5 B/L upper and lower extremities;  CHEST/LUNG: DECREASED BREATH SOUNDS bilaterally, No rales, rhonchi, wheezing. On HFNC 50/100 + NRB 15 L  HEART: s1,s2  No murmurs, rubs, or gallops  ABDOMEN: Soft, Nontender, Nondistended;  EXTREMITIES:  2+ Peripheral Pulses, No clubbing, cyanosis, or edema  LYMPH: No lymphadenopathy noted  SKIN: No rashes or lesions; Good capillary refill      LABS:  CBC Full  -  ( 27 Mar 2021 06:40 )  WBC Count : 19.23 K/uL  RBC Count : 4.63 M/uL  Hemoglobin : 14.6 g/dL  Hematocrit : 43.6 %  Platelet Count - Automated : 401 K/uL  Mean Cell Volume : 94.2 fl  Mean Cell Hemoglobin : 31.5 pg  Mean Cell Hemoglobin Concentration : 33.5 gm/dL  Auto Neutrophil # : x  Auto Lymphocyte # : x  Auto Monocyte # : x  Auto Eosinophil # : x  Auto Basophil # : x  Auto Neutrophil % : x  Auto Lymphocyte % : x  Auto Monocyte % : x  Auto Eosinophil % : x  Auto Basophil % : x    03-27    137  |  106  |  24<H>  ----------------------------<  110<H>  4.5   |  20<L>  |  0.69    Ca    8.4      27 Mar 2021 06:40  Phos  3.0     03-27  Mg     2.4     03-27    TPro  6.5  /  Alb  2.1<L>  /  TBili  0.7  /  DBili  x   /  AST  32  /  ALT  103<H>  /  AlkPhos  83  03-27        CRITICAL CARE TIME SPENT: 35 minutes

## 2021-03-28 NOTE — PROGRESS NOTE ADULT - ASSESSMENT
Assessment and Recommendation:   Problem/Recommendation - 1:  Problem: COVID-19. Recommendation: isolation precautions  oxygen supp - On HFNC /NRMBM   ICU management.   Monitor oxygen sat  Monitor LFT, LDH, CRP, D-Dimer, Ferritin and procalcitonin  Vit C, D and zinc supp  Montelukast 10 mgs po Qhs  IV steroids.  Low threshold for intubation   DVT and GI PPX     Problem/Recommendation - 2:  ·  Problem: UTI (urinary tract infection).  Recommendation: F.U cultures   Off Antibiotics.     Problem/Recommendation - 3:  ·  Problem: Chest pain.  Recommendation: likely related to Covid-19 infection.     Problem/Recommendation - 4:  ·  Problem: Transaminitis.  Recommendation: monitor LFTs  GI F/U     Problem/Recommendation - 5:  ·  Problem: Pneumothorax.  Recommendation: New trace right apical pneumothorax. New mild left apical pneumothorax.  Thoracic sx eval noted.  CXR showed no pneumothorax   Continue to f/u CXR   Management per ICU

## 2021-03-28 NOTE — PROGRESS NOTE ADULT - SUBJECTIVE AND OBJECTIVE BOX
Patient is a 55y old  Male who presents with a chief complaint of SOB (28 Mar 2021 06:50)  patient is awake, alert, out of bed to chair with oxygen supp via HF and NRBM. Tachypneic in mild distress. Oxygen sat 91%    INTERVAL HPI/OVERNIGHT EVENTS:      VITAL SIGNS:  T(F): 97 (03-28-21 @ 08:00)  HR: 132 (03-28-21 @ 11:31)  BP: 166/74 (03-28-21 @ 08:00)  RR: 43 (03-28-21 @ 08:00)  SpO2: 87% (03-28-21 @ 11:31)  Wt(kg): --  I&O's Detail    27 Mar 2021 07:01  -  28 Mar 2021 07:00  --------------------------------------------------------  IN:    IV PiggyBack: 50 mL    Oral Fluid: 1260 mL  Total IN: 1310 mL    OUT:    Voided (mL): 1450 mL  Total OUT: 1450 mL    Total NET: -140 mL      28 Mar 2021 07:01  -  28 Mar 2021 11:33  --------------------------------------------------------  IN:    Oral Fluid: 300 mL  Total IN: 300 mL    OUT:    Voided (mL): 350 mL  Total OUT: 350 mL    Total NET: -50 mL              REVIEW OF SYSTEMS:    CONSTITUTIONAL:  No fevers, chills, sweats    HEENT:  Eyes:  No diplopia or blurred vision. ENT:  No earache, sore throat or runny nose.    CARDIOVASCULAR:  No pressure, squeezing, tightness, or heaviness about the chest; no palpitations.    RESPIRATORY:  Per HPI    GASTROINTESTINAL:  No abdominal pain, nausea, vomiting or diarrhea.    GENITOURINARY:  No dysuria, frequency or urgency.    NEUROLOGIC:  No paresthesias, fasciculations, seizures or weakness.    PSYCHIATRIC:  No disorder of thought or mood.      PHYSICAL EXAM:    Constitutional: Well developed and nourished  Eyes:Perrla  ENMT: normal  Neck:supple  Respiratory: rales post  Cardiovascular: S1 S2 regular  Gastrointestinal: Soft, Non tender  Extremities: No edema  Vascular:normal  Neurological:Awake, alert,Ox3  Musculoskeletal:Normal      MEDICATIONS  (STANDING):  ALBUTerol    90 MICROgram(s) HFA Inhaler 2 Puff(s) Inhalation every 6 hours  ALPRAZolam 0.25 milliGRAM(s) Oral two times a day  ascorbic acid 1000 milliGRAM(s) Oral daily  aspirin enteric coated 81 milliGRAM(s) Oral daily  benzocaine 15 mG/menthol 3.6 mG (Sugar-Free) Lozenge 1 Lozenge Oral daily  chlorhexidine 2% Cloths 1 Application(s) Topical <User Schedule>  enoxaparin Injectable 40 milliGRAM(s) SubCutaneous daily  insulin lispro (ADMELOG) corrective regimen sliding scale   SubCutaneous three times a day before meals  methylPREDNISolone sodium succinate Injectable 40 milliGRAM(s) IV Push every 12 hours  pantoprazole    Tablet 40 milliGRAM(s) Oral before breakfast  polyethylene glycol 3350 17 Gram(s) Oral daily  senna 2 Tablet(s) Oral at bedtime  sodium chloride 0.9%. 1000 milliLiter(s) (90 mL/Hr) IV Continuous <Continuous>    MEDICATIONS  (PRN):  acetaminophen   Tablet .. 650 milliGRAM(s) Oral every 6 hours PRN Temp greater or equal to 38C (100.4F), Moderate Pain (4 - 6)  morphine  - Injectable 2 milliGRAM(s) IV Push every 4 hours PRN air hunger  sodium chloride 0.65% Nasal 1 Spray(s) Both Nostrils two times a day PRN Nasal Congestion      Allergies    No Known Allergies    Intolerances        LABS:                        14.6   17.33 )-----------( 393      ( 28 Mar 2021 03:54 )             43.5     03-28    139  |  107  |  25<H>  ----------------------------<  115<H>  4.4   |  24  |  0.71    Ca    8.5      28 Mar 2021 03:54  Phos  3.5     03-28  Mg     2.5     03-28    TPro  6.4  /  Alb  2.3<L>  /  TBili  0.8  /  DBili  x   /  AST  28  /  ALT  92<H>  /  AlkPhos  84  03-28              CAPILLARY BLOOD GLUCOSE      POCT Blood Glucose.: 98 mg/dL (28 Mar 2021 06:14)  POCT Blood Glucose.: 149 mg/dL (27 Mar 2021 22:59)  POCT Blood Glucose.: 114 mg/dL (27 Mar 2021 16:58)    pro-bnp -- 03-28 @ 03:54     d-dimer 224  03-28 @ 03:54  pro-bnp -- 03-25 @ 05:41     d-dimer 242  03-25 @ 05:41      RADIOLOGY & ADDITIONAL TESTS:    CXR:  < from: Xray Chest 1 View- PORTABLE-Urgent (Xray Chest 1 View- PORTABLE-Urgent .) (03.26.21 @ 09:18) >  FINDINGS/  IMPRESSION:  Bilateral airspace opacities without significant change. Study limited due to rotation. No gross pneumothorax.    Heart size cannot be accurately assessed in this projection.      < end of copied text >    Ct scan chest:    ekg;    echo:

## 2021-03-28 NOTE — PROGRESS NOTE ADULT - ATTENDING COMMENTS
55 yr old  man , non smoker with  moody 1990s presented 3/14 with x9 days worsening cough, subjective fevers, and SOB, with x2-3 days dysuria and central, non-radiating, constant CP. Admitted to medicine unit  for acute hypoxic respiratory failure secondary to pna from covid-19 infection .     Assessment:  1. Acute hypoxic respiratory failure  2 Covid-19 infection   3. Transaminitis  4. Prediabetes  5. Bilateral pneumothorax    Plan   -Thoracic surgery  no intervention   -Repeated CXR  is worst and monitor respiratory status  -isolation : contact and air borne   -HFNC support needed to maintain O2 sat>90%  -monitor biomarkers daily, trending down   -trial of semi- pulse steroid .. solumedrol 250 bid x 3/3 days   -dvt/gi prophy  -not a candidate for remdesivir due to covid-19 antibodies and elevated LFT  -not a candidate for Tociluzimab due to elevated LFT  -hemodynamic monitoring   -self prone as tolerated   -Oral diet  -OOB to chair   -PT evaluation .

## 2021-03-29 DIAGNOSIS — R53.81 OTHER MALAISE: ICD-10-CM

## 2021-03-29 DIAGNOSIS — U07.1 COVID-19: ICD-10-CM

## 2021-03-29 DIAGNOSIS — E44.0 MODERATE PROTEIN-CALORIE MALNUTRITION: ICD-10-CM

## 2021-03-29 DIAGNOSIS — J96.01 ACUTE RESPIRATORY FAILURE WITH HYPOXIA: ICD-10-CM

## 2021-03-29 DIAGNOSIS — Z51.5 ENCOUNTER FOR PALLIATIVE CARE: ICD-10-CM

## 2021-03-29 LAB
ALBUMIN SERPL ELPH-MCNC: 2.4 G/DL — LOW (ref 3.5–5)
ALP SERPL-CCNC: 111 U/L — SIGNIFICANT CHANGE UP (ref 40–120)
ALT FLD-CCNC: 106 U/L DA — HIGH (ref 10–60)
ANION GAP SERPL CALC-SCNC: 11 MMOL/L — SIGNIFICANT CHANGE UP (ref 5–17)
APTT BLD: 26.2 SEC — LOW (ref 27.5–35.5)
AST SERPL-CCNC: 39 U/L — SIGNIFICANT CHANGE UP (ref 10–40)
BASE EXCESS BLDA CALC-SCNC: -3.7 MMOL/L — LOW (ref -2–2)
BASE EXCESS BLDA CALC-SCNC: -4.7 MMOL/L — LOW (ref -2–2)
BASE EXCESS BLDV CALC-SCNC: -1 MMOL/L — SIGNIFICANT CHANGE UP (ref -2–2)
BASE EXCESS BLDV CALC-SCNC: -4.3 MMOL/L — LOW (ref -2–2)
BILIRUB SERPL-MCNC: 0.8 MG/DL — SIGNIFICANT CHANGE UP (ref 0.2–1.2)
BLOOD GAS COMMENTS ARTERIAL: SIGNIFICANT CHANGE UP
BLOOD GAS COMMENTS ARTERIAL: SIGNIFICANT CHANGE UP
BLOOD GAS COMMENTS, VENOUS: SIGNIFICANT CHANGE UP
BUN SERPL-MCNC: 25 MG/DL — HIGH (ref 7–18)
CALCIUM SERPL-MCNC: 8.7 MG/DL — SIGNIFICANT CHANGE UP (ref 8.4–10.5)
CHLORIDE SERPL-SCNC: 105 MMOL/L — SIGNIFICANT CHANGE UP (ref 96–108)
CO2 SERPL-SCNC: 21 MMOL/L — LOW (ref 22–31)
CREAT SERPL-MCNC: 0.71 MG/DL — SIGNIFICANT CHANGE UP (ref 0.5–1.3)
FERRITIN SERPL-MCNC: 2245 NG/ML — HIGH (ref 30–400)
GLUCOSE BLDC GLUCOMTR-MCNC: 112 MG/DL — HIGH (ref 70–99)
GLUCOSE BLDC GLUCOMTR-MCNC: 118 MG/DL — HIGH (ref 70–99)
GLUCOSE BLDC GLUCOMTR-MCNC: 141 MG/DL — HIGH (ref 70–99)
GLUCOSE SERPL-MCNC: 138 MG/DL — HIGH (ref 70–99)
HCO3 BLDA-SCNC: 22 MMOL/L — LOW (ref 23–27)
HCO3 BLDA-SCNC: 24 MMOL/L — SIGNIFICANT CHANGE UP (ref 23–27)
HCO3 BLDV-SCNC: 25 MMOL/L — SIGNIFICANT CHANGE UP (ref 21–29)
HCO3 BLDV-SCNC: 26 MMOL/L — SIGNIFICANT CHANGE UP (ref 21–29)
HCT VFR BLD CALC: 46.9 % — SIGNIFICANT CHANGE UP (ref 39–50)
HGB BLD-MCNC: 15.8 G/DL — SIGNIFICANT CHANGE UP (ref 13–17)
HOROWITZ INDEX BLDA+IHG-RTO: 100 — SIGNIFICANT CHANGE UP
HOROWITZ INDEX BLDA+IHG-RTO: 100 — SIGNIFICANT CHANGE UP
HOROWITZ INDEX BLDV+IHG-RTO: 100 — SIGNIFICANT CHANGE UP
HOROWITZ INDEX BLDV+IHG-RTO: 21 — SIGNIFICANT CHANGE UP
INR BLD: 1.14 RATIO — SIGNIFICANT CHANGE UP (ref 0.88–1.16)
MAGNESIUM SERPL-MCNC: 2.4 MG/DL — SIGNIFICANT CHANGE UP (ref 1.6–2.6)
MCHC RBC-ENTMCNC: 31.5 PG — SIGNIFICANT CHANGE UP (ref 27–34)
MCHC RBC-ENTMCNC: 33.7 GM/DL — SIGNIFICANT CHANGE UP (ref 32–36)
MCV RBC AUTO: 93.6 FL — SIGNIFICANT CHANGE UP (ref 80–100)
NRBC # BLD: 0 /100 WBCS — SIGNIFICANT CHANGE UP (ref 0–0)
PCO2 BLDA: 47 MMHG — HIGH (ref 32–46)
PCO2 BLDA: 58 MMHG — HIGH (ref 32–46)
PCO2 BLDV: 54 MMHG — SIGNIFICANT CHANGE UP (ref 42–55)
PCO2 BLDV: 63 MMHG — HIGH (ref 42–55)
PH BLDA: 7.23 — LOW (ref 7.35–7.45)
PH BLDA: 7.3 — LOW (ref 7.35–7.45)
PH BLDV: 7.22 — LOW (ref 7.35–7.45)
PH BLDV: 7.3 — LOW (ref 7.35–7.45)
PHOSPHATE SERPL-MCNC: 3.2 MG/DL — SIGNIFICANT CHANGE UP (ref 2.5–4.5)
PLATELET # BLD AUTO: 411 K/UL — HIGH (ref 150–400)
PO2 BLDA: 71 MMHG — LOW (ref 74–108)
PO2 BLDA: 71 MMHG — LOW (ref 74–108)
PO2 BLDV: 55 MMHG — HIGH (ref 25–45)
PO2 BLDV: 57 MMHG — HIGH (ref 25–45)
POTASSIUM SERPL-MCNC: 4.6 MMOL/L — SIGNIFICANT CHANGE UP (ref 3.5–5.3)
POTASSIUM SERPL-SCNC: 4.6 MMOL/L — SIGNIFICANT CHANGE UP (ref 3.5–5.3)
PROCALCITONIN SERPL-MCNC: 0.08 NG/ML — SIGNIFICANT CHANGE UP (ref 0.02–0.1)
PROT SERPL-MCNC: 6.9 G/DL — SIGNIFICANT CHANGE UP (ref 6–8.3)
PROTHROM AB SERPL-ACNC: 13.5 SEC — SIGNIFICANT CHANGE UP (ref 10.6–13.6)
RBC # BLD: 5.01 M/UL — SIGNIFICANT CHANGE UP (ref 4.2–5.8)
RBC # FLD: 12.8 % — SIGNIFICANT CHANGE UP (ref 10.3–14.5)
SAO2 % BLDA: 89 % — LOW (ref 92–96)
SAO2 % BLDA: 90 % — LOW (ref 92–96)
SAO2 % BLDV: 78 % — SIGNIFICANT CHANGE UP (ref 67–88)
SAO2 % BLDV: 84 % — SIGNIFICANT CHANGE UP (ref 67–88)
SODIUM SERPL-SCNC: 137 MMOL/L — SIGNIFICANT CHANGE UP (ref 135–145)
WBC # BLD: 23 K/UL — HIGH (ref 3.8–10.5)
WBC # FLD AUTO: 23 K/UL — HIGH (ref 3.8–10.5)

## 2021-03-29 PROCEDURE — 99223 1ST HOSP IP/OBS HIGH 75: CPT

## 2021-03-29 PROCEDURE — 71045 X-RAY EXAM CHEST 1 VIEW: CPT | Mod: 26,77

## 2021-03-29 PROCEDURE — 71045 X-RAY EXAM CHEST 1 VIEW: CPT | Mod: 26

## 2021-03-29 RX ORDER — SODIUM CHLORIDE 9 MG/ML
10 INJECTION INTRAMUSCULAR; INTRAVENOUS; SUBCUTANEOUS
Refills: 0 | Status: DISCONTINUED | OUTPATIENT
Start: 2021-03-29 | End: 2021-04-23

## 2021-03-29 RX ORDER — ASPIRIN/CALCIUM CARB/MAGNESIUM 324 MG
81 TABLET ORAL DAILY
Refills: 0 | Status: DISCONTINUED | OUTPATIENT
Start: 2021-03-29 | End: 2021-04-23

## 2021-03-29 RX ORDER — SUCCINYLCHOLINE CHLORIDE 100 MG/5ML
200 SYRINGE (ML) INTRAVENOUS ONCE
Refills: 0 | Status: COMPLETED | OUTPATIENT
Start: 2021-03-29 | End: 2021-03-29

## 2021-03-29 RX ORDER — PROPOFOL 10 MG/ML
30 INJECTION, EMULSION INTRAVENOUS
Qty: 1000 | Refills: 0 | Status: DISCONTINUED | OUTPATIENT
Start: 2021-03-29 | End: 2021-03-30

## 2021-03-29 RX ORDER — INSULIN LISPRO 100/ML
VIAL (ML) SUBCUTANEOUS EVERY 6 HOURS
Refills: 0 | Status: DISCONTINUED | OUTPATIENT
Start: 2021-03-29 | End: 2021-04-23

## 2021-03-29 RX ORDER — PROPOFOL 10 MG/ML
5 INJECTION, EMULSION INTRAVENOUS ONCE
Refills: 0 | Status: COMPLETED | OUTPATIENT
Start: 2021-03-29 | End: 2021-03-29

## 2021-03-29 RX ORDER — FENTANYL CITRATE 50 UG/ML
2 INJECTION INTRAVENOUS
Qty: 2500 | Refills: 0 | Status: DISCONTINUED | OUTPATIENT
Start: 2021-03-29 | End: 2021-03-30

## 2021-03-29 RX ORDER — ACETAMINOPHEN 500 MG
650 TABLET ORAL EVERY 6 HOURS
Refills: 0 | Status: DISCONTINUED | OUTPATIENT
Start: 2021-03-29 | End: 2021-04-03

## 2021-03-29 RX ORDER — ETOMIDATE 2 MG/ML
20 INJECTION INTRAVENOUS ONCE
Refills: 0 | Status: COMPLETED | OUTPATIENT
Start: 2021-03-29 | End: 2021-03-29

## 2021-03-29 RX ORDER — PANTOPRAZOLE SODIUM 20 MG/1
40 TABLET, DELAYED RELEASE ORAL DAILY
Refills: 0 | Status: DISCONTINUED | OUTPATIENT
Start: 2021-03-29 | End: 2021-04-12

## 2021-03-29 RX ORDER — PROPOFOL 10 MG/ML
30 INJECTION, EMULSION INTRAVENOUS
Qty: 1000 | Refills: 0 | Status: DISCONTINUED | OUTPATIENT
Start: 2021-03-29 | End: 2021-03-29

## 2021-03-29 RX ADMIN — ALBUTEROL 2 PUFF(S): 90 AEROSOL, METERED ORAL at 05:16

## 2021-03-29 RX ADMIN — PROPOFOL 5 MILLIGRAM(S): 10 INJECTION, EMULSION INTRAVENOUS at 07:15

## 2021-03-29 RX ADMIN — FENTANYL CITRATE 16.8 MICROGRAM(S)/KG/HR: 50 INJECTION INTRAVENOUS at 22:20

## 2021-03-29 RX ADMIN — FENTANYL CITRATE 16.8 MICROGRAM(S)/KG/HR: 50 INJECTION INTRAVENOUS at 06:30

## 2021-03-29 RX ADMIN — MORPHINE SULFATE 2 MILLIGRAM(S): 50 CAPSULE, EXTENDED RELEASE ORAL at 05:06

## 2021-03-29 RX ADMIN — PROPOFOL 15.1 MICROGRAM(S)/KG/MIN: 10 INJECTION, EMULSION INTRAVENOUS at 13:04

## 2021-03-29 RX ADMIN — PROPOFOL 15.1 MICROGRAM(S)/KG/MIN: 10 INJECTION, EMULSION INTRAVENOUS at 22:17

## 2021-03-29 RX ADMIN — Medication 200 MILLIGRAM(S): at 06:33

## 2021-03-29 RX ADMIN — PROPOFOL 15.1 MICROGRAM(S)/KG/MIN: 10 INJECTION, EMULSION INTRAVENOUS at 06:34

## 2021-03-29 RX ADMIN — CHLORHEXIDINE GLUCONATE 1 APPLICATION(S): 213 SOLUTION TOPICAL at 05:16

## 2021-03-29 RX ADMIN — Medication 40 MILLIGRAM(S): at 05:15

## 2021-03-29 RX ADMIN — Medication 2 MILLIGRAM(S): at 06:29

## 2021-03-29 RX ADMIN — MORPHINE SULFATE 2 MILLIGRAM(S): 50 CAPSULE, EXTENDED RELEASE ORAL at 05:36

## 2021-03-29 RX ADMIN — Medication 40 MILLIGRAM(S): at 17:36

## 2021-03-29 RX ADMIN — FENTANYL CITRATE 16.8 MICROGRAM(S)/KG/HR: 50 INJECTION INTRAVENOUS at 13:04

## 2021-03-29 RX ADMIN — ETOMIDATE 20 MILLIGRAM(S): 2 INJECTION INTRAVENOUS at 06:33

## 2021-03-29 RX ADMIN — ALBUTEROL 2 PUFF(S): 90 AEROSOL, METERED ORAL at 21:08

## 2021-03-29 RX ADMIN — ENOXAPARIN SODIUM 40 MILLIGRAM(S): 100 INJECTION SUBCUTANEOUS at 11:40

## 2021-03-29 NOTE — CONSULT NOTE ADULT - PROBLEM SELECTOR RECOMMENDATION 4
monitor LFTs  GI eval
2/2 acute illness. Patient sedated/intubated 2/2 covid-19 PNA. Dependent on ADLs. Guarded prognosis.

## 2021-03-29 NOTE — PROGRESS NOTE ADULT - ASSESSMENT
ASSESSMENT AND PLAN:  55M, no PMH, PSH appy and moody 1990s presented 3/14 with x9 days worsening cough, subjective fevers, and SOB, with x2-3 days dysuria and central, non-radiating, constant CP. Admitted to [facility] for acute hypoxic respiratory failure s/t covid pneumonia, ICU consulted for increasing work of breathing on 15 lpm NRM.     1. Acute hypoxic respiratory failure  2. Covid pneumonia  3. Transaminitis  4. Prediabetes  5. Abnormal TSH    =================== Neuro============================  Alert and oriented x 3   on hf/nr , anxious sat 80-90s      ================= Cardiovascular==========================  Hypertension  resolved  - Initially, noted to have multiple elevated BP readings  -Continue to monitor, may consider low dose acei/arb if hypertensive     TACHYCARDIA:  pT HR in 120s   2/2 hypoxia and anxiety  will keep monitoring   xanax prn      ================- Pulm=================================  Acute hypoxic respiratory failure: secondary to covid pneumonia    -On HFNC + NRB 15L   -D-dimer initially elevated on presentation, now wnl  -Remdesivir was discontinued due to positive antibodies   - add albuterol prn   -procalcitonin wnl, d-dimer in 200s  -started pulse steroids for 3 days - 250mg solumedrol bid- completed3/27  -started solumedrol 40 bid   -Encourage use of incentive spirometry  -OOB/PT  -xanax for anxiousness, morphine 1mg q 4 prn for air hunger    #Pneumothorax and pneumomediastinum:  xray chest :   thoracic surgery consulted recommended no intervention at this time, just observation for now  xray monitoring daily    ==================ID===================================  Covid pneumonia  - leukocytosis  -remains afebrile  -plan as above     ================= Nephro================================  -No issues   -voiding without issue  -monitor lytes, SCr   -monitor I/Os    =================GI====================================  Transaminitis:   likely secondary to covid   - and  on presentation  -Improving-  -continue to monitor,  -  hepatitis panel -ve    ================ Heme==================================  Elevated d-dimer: likely secondary to covid  -d-dimer 423 on presentation, now wnl  -continue prophylactic Lovenox 40 mg QD    =================Endocrine===============================  Prediabetes:    -a1c  5.8  -BS controlled  -continue HSS  -monitor FS while on steroids    Abnormal TSH:  -TSH level noted 0.26,   -TSH 0.37 and   -f/u Free T4      ================= Skin/Catheters============================  No rashes. Peripheral IV lines.     - =================Prophylaxis =============================  Lovenox for DVT proph  Protonix for  GI proph    ==================GOC==================================   FULL CODE ASSESSMENT AND PLAN:  55M, no PMH, PSH appy and moody 1990s presented 3/14 with x9 days worsening cough, subjective fevers, and SOB, with x2-3 days dysuria and central, non-radiating, constant CP. Admitted to [facility] for acute hypoxic respiratory failure s/t covid pneumonia, ICU consulted for increasing work of breathing on 15 lpm NRM.     1. Acute hypoxic respiratory failure  2. Covid pneumonia  3. Transaminitis  4. Prediabetes  5. Abnormal TSH    =================== Neuro============================  Alert and oriented x 3 at baseline     intubated and sedated 3/29      ================= Cardiovascular==========================  Hypertension  resolved  - Initially, noted to have multiple elevated BP readings  -Continue to monitor, may consider low dose acei/arb if hypertensive     TACHYCARDIA:  pT HR in 80s   will keep monitoring         ================- Pulm=================================  Acute hypoxic respiratory failure: secondary to covid pneumonia  -was on  HFNr then got intubated 3/29  -D-dimer initially elevated on presentation, now wnl  -Remdesivir was discontinued due to positive antibodies   - add albuterol prn   -procalcitonin wnl, d-dimer in 200s  -started pulse steroids for 3 days - 250mg solumedrol bid- completed3/27  -started solumedrol 40 bid     #Pneumothorax and pneumomediastinum:  xray chest : tiny left apical pneumothorax  thoracic surgery consulted recommended no intervention at this time, just observation for now  xray monitoring daily    ==================ID===================================  Covid pneumonia  - leukocytosis 2/2 steroids  -remains afebrile  -plan as above     ================= Nephro================================  -No issues   -monitor lytes, SCr   -monitor I/Os    =================GI====================================  Transaminitis:   likely secondary to covid   - and  on presentation  -Improving-  -continue to monitor,  -  hepatitis panel -ve    ================ Heme==================================  Elevated d-dimer: likely secondary to covid  -d-dimer 423 on presentation, now wnl  -continue prophylactic Lovenox 40 mg QD    =================Endocrine===============================  Prediabetes:    -a1c  5.8  -BS controlled  -continue HSS  -monitor FS while on steroids    Abnormal TSH:  -TSH level noted 0.26,   -TSH 0.37 and   - Free T4 1.82      ================= Skin/Catheters============================  No rashes. Peripheral IV lines.   central line present 3/29    - =================Prophylaxis =============================  Lovenox for DVT proph  Protonix for  GI proph    ==================GOC==================================   FULL CODE

## 2021-03-29 NOTE — CONSULT NOTE ADULT - PROBLEM SELECTOR RECOMMENDATION 5
See GOC section above. See GOC section above.    Discussed with Dr. Gonzalez, ICU team, Dr. Martinez, Dr. Ryan See GOC section above. Patient remains a full code at this time. Placed order for SW to assist in identifying surrogate.     Discussed with Dr. Gonzalez, ICU team, Dr. Martinez, Dr. Ryan

## 2021-03-29 NOTE — CONSULT NOTE ADULT - SUBJECTIVE AND OBJECTIVE BOX
HPI:  55M, no PMH, PSH appy and moody 1990s presented 3/14 with x9 days worsening cough, subjective fevers, and SOB, with x2-3 days dysuria and central, non-radiating, constant CP. Additionally endorses HA, LH, and myaglias, and denies abdominal pain and n/v/d. Patient is a nonsmoker, lives alone, and denies sick contacts. In ED, T 99.1, HR 76, /65, RR 18, and SPO2 98%. Given discomfort of breathing, placed on 4L NC, with improvement. Labs notable for lymphopenia and neutrophilia despite WBC wnl, d-dimer 423, AST//114, lactate 3.3, and . Trop negative x1. +COVID. CXR notable for b/l infiltrates, L>R. Given x1 dose Tylenol and 1L IVF bolus in ED. Admitted to [facility] for management of COVID PNA.  (14 Mar 2021 16:07)    Brief hospital course:  Patient was initially admitted to medical floor for hypoxic respiratory failure s/t covid pneumonia, was initially on 4lpm NC, started on remdesivir and decadron however remdesivir discontinued due to presence of antibodies. 3/19: ICU consulted for increasing work of breathing - patient was transferred to ICU for worsening respiratory failure requiring HFNC. This morning, pt was in severe respiratory distress requiring emergent intubation. Request to identify surrogate.      PAST MEDICAL & SURGICAL HISTORY:  No pertinent past medical history  S/P appendectomy  1990s  S/P moody  1990s      SOCIAL HISTORY:    Admitted from:  home   Substance abuse history/    Tobacco hx/       Alcohol hx:  unable to obtain 2/2 mental status            Home Opioid hx: none  [employer]:    Fa[employer]                                Preferred Language: French    Surrogate/HCP/Guardian:            Phone#:    FAMILY HISTORY: unable to obtain 2/2 mental status     Baseline ADLs (prior to admission): unable to obtain 2/2 mental status     No Known Allergies    Present Symptoms:    Review of Systems: Unable to obtain - patient sedated/intubated    MEDICATIONS  (STANDING):  ALBUTerol    90 MICROgram(s) HFA Inhaler 2 Puff(s) Inhalation every 6 hours  ALPRAZolam 0.25 milliGRAM(s) Oral two times a day  ascorbic acid 1000 milliGRAM(s) Oral daily  aspirin  chewable 81 milliGRAM(s) Oral daily  chlorhexidine 2% Cloths 1 Application(s) Topical <User Schedule>  enoxaparin Injectable 40 milliGRAM(s) SubCutaneous daily  fentaNYL   Infusion. 2 MICROgram(s)/kG/Hr (16.8 mL/Hr) IV Continuous <Continuous>  insulin lispro (ADMELOG) corrective regimen sliding scale   SubCutaneous every 6 hours  methylPREDNISolone sodium succinate Injectable 40 milliGRAM(s) IV Push every 12 hours  pantoprazole   Suspension 40 milliGRAM(s) Oral daily  propofol Infusion 30 MICROgram(s)/kG/Min (15.1 mL/Hr) IV Continuous <Continuous>  sodium chloride 0.9%. 1000 milliLiter(s) (90 mL/Hr) IV Continuous <Continuous>    MEDICATIONS  (PRN):  acetaminophen   Tablet .. 650 milliGRAM(s) Oral every 6 hours PRN Temp greater or equal to 38C (100.4F)  morphine  - Injectable 2 milliGRAM(s) IV Push every 4 hours PRN air hunger  sodium chloride 0.9% lock flush 10 milliLiter(s) IV Push every 1 hour PRN Pre/post blood products, medications, blood draw, and to maintain line patency      PHYSICAL EXAM:    Vital Signs Last 24 Hrs  T(C): 37.3 (29 Mar 2021 12:00), Max: 37.6 (29 Mar 2021 07:00)  T(F): 99.2 (29 Mar 2021 12:00), Max: 99.6 (29 Mar 2021 07:00)  HR: 82 (29 Mar 2021 13:00) (73 - 149)  BP: 111/54 (29 Mar 2021 13:00) (88/52 - 174/82)  BP(mean): 66 (29 Mar 2021 13:00) (58 - 106)  RR: 26 (29 Mar 2021 13:00) (20 - 49)  SpO2: 94% (29 Mar 2021 12:00) (82% - 100%)    General: Patient not medically stable for full physical exam 2/2 covid-19 status. Patient sedated/intubated.   Karnofsky Performance Score/Palliative Performance Status Version2:     10%    HEENT:  ET tube   Lungs: comfortable, on ventilator. Continues fentanyl, propofol     CV: RRR  GI:   incontinent  :   andrea  Musculoskeletal: patient now sedated/intubated, dependent on ADLs  Skin: unable to assess   Neuro:  patient now sedated/intubated, dependent on ADLs  Oral intake ability: unable/only mouth care   Diet: NPO    LABS:                        15.8   23.00 )-----------( 411      ( 29 Mar 2021 05:02 )             46.9     03-29    137  |  105  |  25<H>  ----------------------------<  138<H>  4.6   |  21<L>  |  0.71    Ca    8.7      29 Mar 2021 05:02  Phos  3.2     03-29  Mg     2.4     03-29    TPro  6.9  /  Alb  2.4<L>  /  TBili  0.8  /  DBili  x   /  AST  39  /  ALT  106<H>  /  AlkPhos  111  03-29        RADIOLOGY & ADDITIONAL STUDIES:  < from: Xray Chest 1 View- PORTABLE-Urgent (Xray Chest 1 View- PORTABLE-Urgent .) (03.29.21 @ 07:42) >    EXAM:  XR CHEST PORTABLE URGENT 1V                            PROCEDURE DATE:  03/29/2021          INTERPRETATION:  AP chest on March 29, 2021 at 7:16 AM. Heart is magnified by technique. Present film shows intubation apparently new since March 28.    Diffuse mid lower lung field infiltrates which is roughly symmetric again noted. There may have increased somewhat compared to March 28.    Subcutaneous emphysema in the neck is again noted.    Very tiny left apical pneumothorax is unchanged.    IMPRESSION: Tiny left apical pneumothorax remains.    Bilateral infiltrates again noted slightly increased. Intubation.      < end of copied text >      ADVANCE DIRECTIVES: full code    HPI:  55M, no PMH, PSH appy and moody 1990s presented 3/14 with x9 days worsening cough, subjective fevers, and SOB, with x2-3 days dysuria and central, non-radiating, constant CP. Additionally endorses HA, LH, and myaglias, and denies abdominal pain and n/v/d. Patient is a nonsmoker, lives alone, and denies sick contacts. In ED, T 99.1, HR 76, /65, RR 18, and SPO2 98%. Given discomfort of breathing, placed on 4L NC, with improvement. Labs notable for lymphopenia and neutrophilia despite WBC wnl, d-dimer 423, AST//114, lactate 3.3, and . Trop negative x1. +COVID. CXR notable for b/l infiltrates, L>R. Given x1 dose Tylenol and 1L IVF bolus in ED. Admitted to [facility] for management of COVID PNA.  (14 Mar 2021 16:07)    Brief hospital course:  Patient was initially admitted to medical floor for hypoxic respiratory failure s/t covid pneumonia, was initially on 4lpm NC, started on remdesivir and decadron however remdesivir discontinued due to presence of antibodies. 3/19: ICU consulted for increasing work of breathing - patient was transferred to ICU for worsening respiratory failure requiring HFNC. This morning, pt was in severe respiratory distress requiring emergent intubation. Request to identify surrogate.      PAST MEDICAL & SURGICAL HISTORY:  No pertinent past medical history  S/P appendectomy  1990s  S/P moody  1990s      SOCIAL HISTORY:    Admitted from:  home   Substance abuse history/    Tobacco hx/       Alcohol hx:  unable to obtain 2/2 mental status            Home Opioid hx: none  Zoroastrian:    Zoroastrianism                                Preferred Language: Kinyarwanda    Surrogate/HCP/Guardian:   Antwan (nephew): [phone]    FAMILY HISTORY: unable to obtain 2/2 mental status     Baseline ADLs (prior to admission): unable to obtain 2/2 mental status     No Known Allergies    Present Symptoms:    Review of Systems: Unable to obtain - patient sedated/intubated    MEDICATIONS  (STANDING):  ALBUTerol    90 MICROgram(s) HFA Inhaler 2 Puff(s) Inhalation every 6 hours  ALPRAZolam 0.25 milliGRAM(s) Oral two times a day  ascorbic acid 1000 milliGRAM(s) Oral daily  aspirin  chewable 81 milliGRAM(s) Oral daily  chlorhexidine 2% Cloths 1 Application(s) Topical <User Schedule>  enoxaparin Injectable 40 milliGRAM(s) SubCutaneous daily  fentaNYL   Infusion. 2 MICROgram(s)/kG/Hr (16.8 mL/Hr) IV Continuous <Continuous>  insulin lispro (ADMELOG) corrective regimen sliding scale   SubCutaneous every 6 hours  methylPREDNISolone sodium succinate Injectable 40 milliGRAM(s) IV Push every 12 hours  pantoprazole   Suspension 40 milliGRAM(s) Oral daily  propofol Infusion 30 MICROgram(s)/kG/Min (15.1 mL/Hr) IV Continuous <Continuous>  sodium chloride 0.9%. 1000 milliLiter(s) (90 mL/Hr) IV Continuous <Continuous>    MEDICATIONS  (PRN):  acetaminophen   Tablet .. 650 milliGRAM(s) Oral every 6 hours PRN Temp greater or equal to 38C (100.4F)  morphine  - Injectable 2 milliGRAM(s) IV Push every 4 hours PRN air hunger  sodium chloride 0.9% lock flush 10 milliLiter(s) IV Push every 1 hour PRN Pre/post blood products, medications, blood draw, and to maintain line patency      PHYSICAL EXAM:    Vital Signs Last 24 Hrs  T(C): 37.3 (29 Mar 2021 12:00), Max: 37.6 (29 Mar 2021 07:00)  T(F): 99.2 (29 Mar 2021 12:00), Max: 99.6 (29 Mar 2021 07:00)  HR: 82 (29 Mar 2021 13:00) (73 - 149)  BP: 111/54 (29 Mar 2021 13:00) (88/52 - 174/82)  BP(mean): 66 (29 Mar 2021 13:00) (58 - 106)  RR: 26 (29 Mar 2021 13:00) (20 - 49)  SpO2: 94% (29 Mar 2021 12:00) (82% - 100%)    General: Patient not medically stable for full physical exam 2/2 covid-19 status. Patient sedated/intubated.   Karnofsky Performance Score/Palliative Performance Status Version2:     10%    HEENT:  ET tube   Lungs: comfortable, on ventilator. Continues fentanyl, propofol     CV: RRR  GI:   incontinent  :   andrea  Musculoskeletal: patient now sedated/intubated, dependent on ADLs  Skin: unable to assess   Neuro:  patient now sedated/intubated, dependent on ADLs  Oral intake ability: unable/only mouth care   Diet: NPO    LABS:                        15.8   23.00 )-----------( 411      ( 29 Mar 2021 05:02 )             46.9     03-29    137  |  105  |  25<H>  ----------------------------<  138<H>  4.6   |  21<L>  |  0.71    Ca    8.7      29 Mar 2021 05:02  Phos  3.2     03-29  Mg     2.4     03-29    TPro  6.9  /  Alb  2.4<L>  /  TBili  0.8  /  DBili  x   /  AST  39  /  ALT  106<H>  /  AlkPhos  111  03-29        RADIOLOGY & ADDITIONAL STUDIES:  < from: Xray Chest 1 View- PORTABLE-Urgent (Xray Chest 1 View- PORTABLE-Urgent .) (03.29.21 @ 07:42) >    EXAM:  XR CHEST PORTABLE URGENT 1V                            PROCEDURE DATE:  03/29/2021          INTERPRETATION:  AP chest on March 29, 2021 at 7:16 AM. Heart is magnified by technique. Present film shows intubation apparently new since March 28.    Diffuse mid lower lung field infiltrates which is roughly symmetric again noted. There may have increased somewhat compared to March 28.    Subcutaneous emphysema in the neck is again noted.    Very tiny left apical pneumothorax is unchanged.    IMPRESSION: Tiny left apical pneumothorax remains.    Bilateral infiltrates again noted slightly increased. Intubation.      < end of copied text >      ADVANCE DIRECTIVES: full code

## 2021-03-29 NOTE — CHART NOTE - NSCHARTNOTEFT_GEN_A_CORE
At 5.30 am, pt was in severe respiratory distress with tachypneic in 40s, tachycardic in 140s, saturating at high 70s, so decision was made to sedate and intubate the pt. Anesthesiologist intubated the patient.

## 2021-03-29 NOTE — PROGRESS NOTE ADULT - SUBJECTIVE AND OBJECTIVE BOX
Patient is a 55y old  Male who presents with a chief complaint of SOB (28 Mar 2021 11:32)    pt seen in icu [ x ], reg med floor [  ], bed [ x ], chair at bedside [   ], a+o x3 [ x ], lethargic [  ],    nad [ x ]      Allergies    No Known Allergies        Vitals    T(F): 98.5 (03-29-21 @ 04:00), Max: 98.5 (03-29-21 @ 04:00)  HR: 102 (03-29-21 @ 06:02) (73 - 132)  BP: 170/83 (03-29-21 @ 06:00) (113/64 - 174/82)  RR: 20 (03-29-21 @ 06:00) (20 - 49)  SpO2: 96% (03-29-21 @ 06:02) (82% - 100%)  Wt(kg): --  CAPILLARY BLOOD GLUCOSE      POCT Blood Glucose.: 111 mg/dL (28 Mar 2021 21:54)      Labs                          15.8   23.00 )-----------( 411      ( 29 Mar 2021 05:02 )             46.9       03-29    137  |  105  |  25<H>  ----------------------------<  138<H>  4.6   |  21<L>  |  0.71    Ca    8.7      29 Mar 2021 05:02  Phos  3.2     03-29  Mg     2.4     03-29    TPro  6.9  /  Alb  2.4<L>  /  TBili  0.8  /  DBili  x   /  AST  39  /  ALT  106<H>  /  AlkPhos  111  03-29            .Urine Clean Catch (Midstream)  03-15 @ 00:52   No growth  --  --          Radiology Results      Meds    MEDICATIONS  (STANDING):  ALBUTerol    90 MICROgram(s) HFA Inhaler 2 Puff(s) Inhalation every 6 hours  ALPRAZolam 0.25 milliGRAM(s) Oral two times a day  ascorbic acid 1000 milliGRAM(s) Oral daily  aspirin  chewable 81 milliGRAM(s) Oral daily  chlorhexidine 2% Cloths 1 Application(s) Topical <User Schedule>  enoxaparin Injectable 40 milliGRAM(s) SubCutaneous daily  fentaNYL   Infusion. 2 MICROgram(s)/kG/Hr (16.8 mL/Hr) IV Continuous <Continuous>  insulin lispro (ADMELOG) corrective regimen sliding scale   SubCutaneous every 6 hours  methylPREDNISolone sodium succinate Injectable 40 milliGRAM(s) IV Push every 12 hours  pantoprazole   Suspension 40 milliGRAM(s) Oral daily  propofol Infusion 30 MICROgram(s)/kG/Min (15.1 mL/Hr) IV Continuous <Continuous>  propofol Injectable 5 milliGRAM(s) IV Push once  sodium chloride 0.9%. 1000 milliLiter(s) (90 mL/Hr) IV Continuous <Continuous>      MEDICATIONS  (PRN):  acetaminophen   Tablet .. 650 milliGRAM(s) Oral every 6 hours PRN Temp greater or equal to 38C (100.4F)  morphine  - Injectable 2 milliGRAM(s) IV Push every 4 hours PRN air hunger  sodium chloride 0.9% lock flush 10 milliLiter(s) IV Push every 1 hour PRN Pre/post blood products, medications, blood draw, and to maintain line patency      Physical Exam    Neuro :  no focal deficits  Respiratory: CTA B/L  CV: RRR, S1S2, no murmurs,   Abdominal: Soft, NT, ND +BS,  Extremities: No edema, + peripheral pulses    ASSESSMENT    Hypoxemia 2nd to covid pna   transaminitis  prediabetes  h/o appendectomy  cholecystectomy        PLAN    contact and airborne isolation  d/c remdesevir given covid ab positive noted   completed dexamethasone   started pulse steroids for 3 days - 250mg solumedrol bid  cont asa, vit c,    cont albuterol inhaler   pulm f/u  procalcitonin, D-dimer, crp, ldh, ferritin, lactate noted ,    cont tylenol prn,   cont robitussin prn   O2 sat (89% - 96%) hi miguel ángel and nrb15L  O2 via hi miguel ángel and nrbm 15L  pt low threshold for intubation   xray 3/19/21 with pneumomediastinum  rept cxr with New trace right apical pneumothorax. New mild left apical pneumothorax. Grossly stable small pneumomediastinum.  Soft tissue emphysema at the neck bases bilaterally. Grossly stable bilateral pulmonary infiltrates noted.   cxr 2/24 with No evidence of pneumothorax can be appreciated on the available image. This may be related to patient positioning. Evidence of pneumomediastinum and subcutaneous emphysema in the lower neck is again noted. There are patchy bibasilar infiltrates and elevated right hemidiaphragm noted.  thoracic surg f/u   f/u daily cxr  No thoracic intervention unless pneumothorax worsens and/or pt requires intubation  f/u acute hepatitis panel  lispro ss  cont current meds  cont mgmt as per icu   Patient is a 55y old  Male who presents with a chief complaint of SOB (28 Mar 2021 11:32)    pt seen in icu [ x ], reg med floor [  ], bed [ x ], chair at bedside [   ], a+o x3 [  ], sedated [x  ],    nad [ x ]      pt intubated this am 2nd to resp dist    Allergies    No Known Allergies        Vitals    T(F): 98.5 (03-29-21 @ 04:00), Max: 98.5 (03-29-21 @ 04:00)  HR: 102 (03-29-21 @ 06:02) (73 - 132)  BP: 170/83 (03-29-21 @ 06:00) (113/64 - 174/82)  RR: 20 (03-29-21 @ 06:00) (20 - 49)  SpO2: 96% (03-29-21 @ 06:02) (82% - 100%)  Wt(kg): --  CAPILLARY BLOOD GLUCOSE      POCT Blood Glucose.: 111 mg/dL (28 Mar 2021 21:54)      Labs                          15.8   23.00 )-----------( 411      ( 29 Mar 2021 05:02 )             46.9       03-29    137  |  105  |  25<H>  ----------------------------<  138<H>  4.6   |  21<L>  |  0.71    Ca    8.7      29 Mar 2021 05:02  Phos  3.2     03-29  Mg     2.4     03-29    TPro  6.9  /  Alb  2.4<L>  /  TBili  0.8  /  DBili  x   /  AST  39  /  ALT  106<H>  /  AlkPhos  111  03-29            .Urine Clean Catch (Midstream)  03-15 @ 00:52   No growth  --  --          Radiology Results      < from: Xray Chest 1 View- PORTABLE-Urgent (Xray Chest 1 View- PORTABLE-Urgent .) (03.28.21 @ 09:42) >  IMPRESSION:  No significant change bilateral infiltrates. There is a small simple left apical pneumothorax. No significant pleural effusion. Bilateral subcutaneous emphysema similar to prior.    < end of copied text >      Meds    MEDICATIONS  (STANDING):  ALBUTerol    90 MICROgram(s) HFA Inhaler 2 Puff(s) Inhalation every 6 hours  ALPRAZolam 0.25 milliGRAM(s) Oral two times a day  ascorbic acid 1000 milliGRAM(s) Oral daily  aspirin  chewable 81 milliGRAM(s) Oral daily  chlorhexidine 2% Cloths 1 Application(s) Topical <User Schedule>  enoxaparin Injectable 40 milliGRAM(s) SubCutaneous daily  fentaNYL   Infusion. 2 MICROgram(s)/kG/Hr (16.8 mL/Hr) IV Continuous <Continuous>  insulin lispro (ADMELOG) corrective regimen sliding scale   SubCutaneous every 6 hours  methylPREDNISolone sodium succinate Injectable 40 milliGRAM(s) IV Push every 12 hours  pantoprazole   Suspension 40 milliGRAM(s) Oral daily  propofol Infusion 30 MICROgram(s)/kG/Min (15.1 mL/Hr) IV Continuous <Continuous>  propofol Injectable 5 milliGRAM(s) IV Push once  sodium chloride 0.9%. 1000 milliLiter(s) (90 mL/Hr) IV Continuous <Continuous>      MEDICATIONS  (PRN):  acetaminophen   Tablet .. 650 milliGRAM(s) Oral every 6 hours PRN Temp greater or equal to 38C (100.4F)  morphine  - Injectable 2 milliGRAM(s) IV Push every 4 hours PRN air hunger  sodium chloride 0.9% lock flush 10 milliLiter(s) IV Push every 1 hour PRN Pre/post blood products, medications, blood draw, and to maintain line patency      Physical Exam    Neuro :  no focal deficits  Respiratory: CTA B/L  CV: RRR, S1S2, no murmurs,   Abdominal: Soft, NT, ND +BS,  Extremities: No edema, + peripheral pulses    ASSESSMENT    Hypoxemia 2nd to covid pna   transaminitis  prediabetes  h/o appendectomy  cholecystectomy        PLAN    contact and airborne isolation  d/c remdesevir given covid ab positive noted   completed dexamethasone   started pulse steroids for 3 days - 250mg solumedrol bid  cont asa, vit c,    cont albuterol inhaler   pulm f/u  procalcitonin, D-dimer, crp, ldh, ferritin, lactate noted ,    cont tylenol prn,   cont robitussin prn   pt on fentanyl and propofol drip  s/p intubation 3/29/21  O2 sat (89% - 96%) mech vent  O2 via mech vent   xray 3/19/21 with pneumomediastinum  rept cxr with New trace right apical pneumothorax. New mild left apical pneumothorax. Grossly stable small pneumomediastinum.  Soft tissue emphysema at the neck bases bilaterally. Grossly stable bilateral pulmonary infiltrates noted.   cxr 2/24 with No evidence of pneumothorax can be appreciated on the available image. This may be related to patient positioning. Evidence of pneumomediastinum and subcutaneous emphysema in the lower neck is again noted. There are patchy bibasilar infiltrates and elevated right hemidiaphragm noted.   cxr 3/29/21 with No significant change bilateral infiltrates. There is a small simple left apical pneumothorax. No significant pleural effusion. Bilateral subcutaneous emphysema similar to prior.  thoracic surg f/u   f/u daily cxr  No thoracic intervention unless pneumothorax worsens and/or pt requires intubation  f/u acute hepatitis panel  lispro ss  cont current meds  cont mgmt as per icu

## 2021-03-29 NOTE — PROGRESS NOTE ADULT - SUBJECTIVE AND OBJECTIVE BOX
Patient is a 55y old  Male who presents with a chief complaint of SOB (29 Mar 2021 07:01)  Patient is sedated, intubated on mechanical ventilation in NAD    INTERVAL HPI/OVERNIGHT EVENTS:      VITAL SIGNS:  T(F): 99.6 (03-29-21 @ 07:00)  HR: 110 (03-29-21 @ 08:00)  BP: 104/52 (03-29-21 @ 08:00)  RR: 24 (03-29-21 @ 08:00)  SpO2: 88% (03-29-21 @ 08:00)  Wt(kg): --  I&O's Detail    28 Mar 2021 07:01  -  29 Mar 2021 07:00  --------------------------------------------------------  IN:    FentaNYL: 16.7 mL    Oral Fluid: 910 mL    Propofol: 15.1 mL  Total IN: 941.8 mL    OUT:    Indwelling Catheter - Urethral (mL): 300 mL    Voided (mL): 1900 mL  Total OUT: 2200 mL    Total NET: -1258.2 mL        Mode: AC/ CMV (Assist Control/ Continuous Mandatory Ventilation)  RR (machine): 20  TV (machine): 450  FiO2: 100  PEEP: 10  ITime: 1  MAP: 16  PIP: 34        REVIEW OF SYSTEMS:    CONSTITUTIONAL:  No fevers, chills, sweats    HEENT:  Eyes:  No diplopia or blurred vision. ENT:  No earache, sore throat or runny nose.    CARDIOVASCULAR:  No pressure, squeezing, tightness, or heaviness about the chest; no palpitations.    RESPIRATORY:  Per HPI    GASTROINTESTINAL:  No abdominal pain, nausea, vomiting or diarrhea.    GENITOURINARY:  No dysuria, frequency or urgency.    NEUROLOGIC:  No paresthesias, fasciculations, seizures or weakness.    PSYCHIATRIC:  No disorder of thought or mood.      PHYSICAL EXAM:    Constitutional: Well developed and nourished  Eyes:Perrla  ENMT: normal  Neck:supple  Respiratory: good air entry  Cardiovascular: S1 S2 regular  Gastrointestinal: Soft, Non tender  Extremities: No edema  Vascular:normal  Neurological: Sedated  Musculoskeletal:Normal      MEDICATIONS  (STANDING):  ALBUTerol    90 MICROgram(s) HFA Inhaler 2 Puff(s) Inhalation every 6 hours  ALPRAZolam 0.25 milliGRAM(s) Oral two times a day  ascorbic acid 1000 milliGRAM(s) Oral daily  aspirin  chewable 81 milliGRAM(s) Oral daily  chlorhexidine 2% Cloths 1 Application(s) Topical <User Schedule>  enoxaparin Injectable 40 milliGRAM(s) SubCutaneous daily  fentaNYL   Infusion. 2 MICROgram(s)/kG/Hr (16.8 mL/Hr) IV Continuous <Continuous>  insulin lispro (ADMELOG) corrective regimen sliding scale   SubCutaneous every 6 hours  methylPREDNISolone sodium succinate Injectable 40 milliGRAM(s) IV Push every 12 hours  pantoprazole   Suspension 40 milliGRAM(s) Oral daily  propofol Infusion 30 MICROgram(s)/kG/Min (15.1 mL/Hr) IV Continuous <Continuous>  sodium chloride 0.9%. 1000 milliLiter(s) (90 mL/Hr) IV Continuous <Continuous>    MEDICATIONS  (PRN):  acetaminophen   Tablet .. 650 milliGRAM(s) Oral every 6 hours PRN Temp greater or equal to 38C (100.4F)  morphine  - Injectable 2 milliGRAM(s) IV Push every 4 hours PRN air hunger  sodium chloride 0.9% lock flush 10 milliLiter(s) IV Push every 1 hour PRN Pre/post blood products, medications, blood draw, and to maintain line patency      Allergies    No Known Allergies    Intolerances        LABS:                        15.8   23.00 )-----------( 411      ( 29 Mar 2021 05:02 )             46.9     03-29    137  |  105  |  25<H>  ----------------------------<  138<H>  4.6   |  21<L>  |  0.71    Ca    8.7      29 Mar 2021 05:02  Phos  3.2     03-29  Mg     2.4     03-29    TPro  6.9  /  Alb  2.4<L>  /  TBili  0.8  /  DBili  x   /  AST  39  /  ALT  106<H>  /  AlkPhos  111  03-29        ABG - ( 29 Mar 2021 09:16 )  pH, Arterial: 7.30  pH, Blood: x     /  pCO2: 47    /  pO2: 71    / HCO3: 22    / Base Excess: -3.7  /  SaO2: 90                    CAPILLARY BLOOD GLUCOSE      POCT Blood Glucose.: 111 mg/dL (28 Mar 2021 21:54)    pro-bnp -- 03-28 @ 03:54     d-dimer 224  03-28 @ 03:54  pro-bnp -- 03-25 @ 05:41     d-dimer 242  03-25 @ 05:41      RADIOLOGY & ADDITIONAL TESTS:    CXR:  < from: Xray Chest 1 View- PORTABLE-Urgent (Xray Chest 1 View- PORTABLE-Urgent .) (03.28.21 @ 09:42) >  IMPRESSION:  No significant change bilateral infiltrates. There is a small simple left apical pneumothorax. No significant pleural effusion. Bilateral subcutaneous emphysema similar to prior.    Discussed with Dr. Iverson prior to this dictation    < end of copied text >    Ct scan chest:    ekg;    echo:

## 2021-03-29 NOTE — PROGRESS NOTE ADULT - SUBJECTIVE AND OBJECTIVE BOX
INTERVAL HPI/OVERNIGHT EVENTS: ***    PRESSORS: [ ] YES [ ] NO  WHICH:    ANTIBIOTICS:                      Antimicrobial:    Cardiovascular:    Pulmonary:  ALBUTerol    90 MICROgram(s) HFA Inhaler 2 Puff(s) Inhalation every 6 hours    Hematalogic:  aspirin  chewable 81 milliGRAM(s) Oral daily  enoxaparin Injectable 40 milliGRAM(s) SubCutaneous daily    Other:  acetaminophen   Tablet .. 650 milliGRAM(s) Oral every 6 hours PRN  ALPRAZolam 0.25 milliGRAM(s) Oral two times a day  ascorbic acid 1000 milliGRAM(s) Oral daily  chlorhexidine 2% Cloths 1 Application(s) Topical <User Schedule>  fentaNYL   Infusion. 2 MICROgram(s)/kG/Hr IV Continuous <Continuous>  insulin lispro (ADMELOG) corrective regimen sliding scale   SubCutaneous every 6 hours  methylPREDNISolone sodium succinate Injectable 40 milliGRAM(s) IV Push every 12 hours  morphine  - Injectable 2 milliGRAM(s) IV Push every 4 hours PRN  pantoprazole   Suspension 40 milliGRAM(s) Oral daily  propofol Infusion 30 MICROgram(s)/kG/Min IV Continuous <Continuous>  propofol Injectable 5 milliGRAM(s) IV Push once  sodium chloride 0.9% lock flush 10 milliLiter(s) IV Push every 1 hour PRN  sodium chloride 0.9%. 1000 milliLiter(s) IV Continuous <Continuous>    acetaminophen   Tablet .. 650 milliGRAM(s) Oral every 6 hours PRN  ALBUTerol    90 MICROgram(s) HFA Inhaler 2 Puff(s) Inhalation every 6 hours  ALPRAZolam 0.25 milliGRAM(s) Oral two times a day  ascorbic acid 1000 milliGRAM(s) Oral daily  aspirin  chewable 81 milliGRAM(s) Oral daily  chlorhexidine 2% Cloths 1 Application(s) Topical <User Schedule>  enoxaparin Injectable 40 milliGRAM(s) SubCutaneous daily  fentaNYL   Infusion. 2 MICROgram(s)/kG/Hr IV Continuous <Continuous>  insulin lispro (ADMELOG) corrective regimen sliding scale   SubCutaneous every 6 hours  methylPREDNISolone sodium succinate Injectable 40 milliGRAM(s) IV Push every 12 hours  morphine  - Injectable 2 milliGRAM(s) IV Push every 4 hours PRN  pantoprazole   Suspension 40 milliGRAM(s) Oral daily  propofol Infusion 30 MICROgram(s)/kG/Min IV Continuous <Continuous>  propofol Injectable 5 milliGRAM(s) IV Push once  sodium chloride 0.9% lock flush 10 milliLiter(s) IV Push every 1 hour PRN  sodium chloride 0.9%. 1000 milliLiter(s) IV Continuous <Continuous>    Drug Dosing Weight  Height (cm): 167.6 (14 Mar 2021 12:03)  Weight (kg): 83.869 (14 Mar 2021 12:03)  BMI (kg/m2): 29.9 (14 Mar 2021 12:03)  BSA (m2): 1.93 (14 Mar 2021 12:03)    CENTRAL LINE: [ ] YES [ ] NO  LOCATION:   DATE INSERTED:  REMOVE: [ ] YES [ ] NO  EXPLAIN:    RIVERA: [ ] YES [ ] NO    DATE INSERTED:  REMOVE:  [ ] YES [ ] NO  EXPLAIN:    A-LINE:  [ ] YES [ ] NO  LOCATION:   DATE INSERTED:  REMOVE:  [ ] YES [ ] NO  EXPLAIN:    PMH -reviewed admission note, no change since admission    ICU Vital Signs Last 24 Hrs  T(C): 36.9 (29 Mar 2021 04:00), Max: 36.9 (29 Mar 2021 00:00)  T(F): 98.5 (29 Mar 2021 04:00), Max: 98.5 (29 Mar 2021 04:00)  HR: 102 (29 Mar 2021 06:02) (73 - 132)  BP: 170/83 (29 Mar 2021 06:00) (113/64 - 174/82)  BP(mean): 104 (29 Mar 2021 06:00) (72 - 106)  ABP: --  ABP(mean): --  RR: 20 (29 Mar 2021 06:00) (20 - 49)  SpO2: 96% (29 Mar 2021 06:02) (82% - 100%)      ABG - ( 29 Mar 2021 06:52 )  pH, Arterial: 7.23  pH, Blood: x     /  pCO2: 58    /  pO2: 71    / HCO3: 24    / Base Excess: -4.7  /  SaO2: 89                    03-28 @ 07:01  -  03-29 @ 07:00  --------------------------------------------------------  IN: 910 mL / OUT: 2200 mL / NET: -1290 mL        Mode: AC/ CMV (Assist Control/ Continuous Mandatory Ventilation)  RR (machine): 20  TV (machine): 450  FiO2: 100  PEEP: 10  ITime: 1  MAP: 16  PIP: 34      PHYSICAL EXAM:    GENERAL: NAD, well-groomed, well-developed  HEAD:  Atraumatic, Normocephalic  EYES: EOMI, PERRLA, conjunctiva and sclera clear  ENMT: No tonsillar erythema, exudates, or enlargement; Moist mucous membranes, Good dentition, No lesions  NECK: Supple, normal appearance, No JVD; Normal thyroid; Trachea midline  NERVOUS SYSTEM:  Alert & Oriented X3, Good concentration; Motor Strength 5/5 B/L upper and lower extremities; DTRs 2+ intact and symmetric  CHEST/LUNG: No chest deformity; Normal percussion bilaterally; No rales, rhonchi, wheezing   HEART: Regular rate and rhythm; No murmurs, rubs, or gallops  ABDOMEN: Soft, Nontender, Nondistended; Bowel sounds present  EXTREMITIES:  2+ Peripheral Pulses, No clubbing, cyanosis, or edema  LYMPH: No lymphadenopathy noted  SKIN: No rashes or lesions; Good capillary refill      LABS:  CBC Full  -  ( 29 Mar 2021 05:02 )  WBC Count : 23.00 K/uL  RBC Count : 5.01 M/uL  Hemoglobin : 15.8 g/dL  Hematocrit : 46.9 %  Platelet Count - Automated : 411 K/uL  Mean Cell Volume : 93.6 fl  Mean Cell Hemoglobin : 31.5 pg  Mean Cell Hemoglobin Concentration : 33.7 gm/dL  Auto Neutrophil # : x  Auto Lymphocyte # : x  Auto Monocyte # : x  Auto Eosinophil # : x  Auto Basophil # : x  Auto Neutrophil % : x  Auto Lymphocyte % : x  Auto Monocyte % : x  Auto Eosinophil % : x  Auto Basophil % : x    03-29    137  |  105  |  25<H>  ----------------------------<  138<H>  4.6   |  21<L>  |  0.71    Ca    8.7      29 Mar 2021 05:02  Phos  3.2     03-29  Mg     2.4     03-29    TPro  6.9  /  Alb  2.4<L>  /  TBili  0.8  /  DBili  x   /  AST  39  /  ALT  106<H>  /  AlkPhos  111  03-29            RADIOLOGY & ADDITIONAL STUDIES REVIEWED:  ***    GOALS OF CARE DISCUSSION WITH PATIENT/FAMILY/PROXY:    CRITICAL CARE TIME SPENT: 35 minutes INTERVAL HPI/OVERNIGHT EVENTS: Pt got intubated and got central line        Pulmonary:  ALBUTerol    90 MICROgram(s) HFA Inhaler 2 Puff(s) Inhalation every 6 hours    Hematalogic:  aspirin  chewable 81 milliGRAM(s) Oral daily  enoxaparin Injectable 40 milliGRAM(s) SubCutaneous daily    Other:  acetaminophen   Tablet .. 650 milliGRAM(s) Oral every 6 hours PRN  ALPRAZolam 0.25 milliGRAM(s) Oral two times a day  ascorbic acid 1000 milliGRAM(s) Oral daily  chlorhexidine 2% Cloths 1 Application(s) Topical <User Schedule>  fentaNYL   Infusion. 2 MICROgram(s)/kG/Hr IV Continuous <Continuous>  insulin lispro (ADMELOG) corrective regimen sliding scale   SubCutaneous every 6 hours  methylPREDNISolone sodium succinate Injectable 40 milliGRAM(s) IV Push every 12 hours  morphine  - Injectable 2 milliGRAM(s) IV Push every 4 hours PRN  pantoprazole   Suspension 40 milliGRAM(s) Oral daily  propofol Infusion 30 MICROgram(s)/kG/Min IV Continuous <Continuous>  propofol Injectable 5 milliGRAM(s) IV Push once  sodium chloride 0.9% lock flush 10 milliLiter(s) IV Push every 1 hour PRN  sodium chloride 0.9%. 1000 milliLiter(s) IV Continuous <Continuous>    acetaminophen   Tablet .. 650 milliGRAM(s) Oral every 6 hours PRN  ALBUTerol    90 MICROgram(s) HFA Inhaler 2 Puff(s) Inhalation every 6 hours  ALPRAZolam 0.25 milliGRAM(s) Oral two times a day  ascorbic acid 1000 milliGRAM(s) Oral daily  aspirin  chewable 81 milliGRAM(s) Oral daily  chlorhexidine 2% Cloths 1 Application(s) Topical <User Schedule>  enoxaparin Injectable 40 milliGRAM(s) SubCutaneous daily  fentaNYL   Infusion. 2 MICROgram(s)/kG/Hr IV Continuous <Continuous>  insulin lispro (ADMELOG) corrective regimen sliding scale   SubCutaneous every 6 hours  methylPREDNISolone sodium succinate Injectable 40 milliGRAM(s) IV Push every 12 hours  morphine  - Injectable 2 milliGRAM(s) IV Push every 4 hours PRN  pantoprazole   Suspension 40 milliGRAM(s) Oral daily  propofol Infusion 30 MICROgram(s)/kG/Min IV Continuous <Continuous>  propofol Injectable 5 milliGRAM(s) IV Push once  sodium chloride 0.9% lock flush 10 milliLiter(s) IV Push every 1 hour PRN  sodium chloride 0.9%. 1000 milliLiter(s) IV Continuous <Continuous>    Drug Dosing Weight  Height (cm): 167.6 (14 Mar 2021 12:03)  Weight (kg): 83.869 (14 Mar 2021 12:03)  BMI (kg/m2): 29.9 (14 Mar 2021 12:03)  BSA (m2): 1.93 (14 Mar 2021 12:03)    CENTRAL LINE: [x ] YES [ ] NO  LOCATION: [facility]   DATE INSERTED: 3/29  REMOVE: [ ] YES [ ] NO  EXPLAIN:    RIVERA: [x ] YES [ ] NO    DATE INSERTED:  REMOVE:  [ ] YES [ ] NO  EXPLAIN:    A-LINE:  [ ] YES [x ] NO  LOCATION:   DATE INSERTED:  REMOVE:  [ ] YES [ ] NO  EXPLAIN:    PMH -reviewed admission note, no change since admission    ICU Vital Signs Last 24 Hrs  T(C): 36.9 (29 Mar 2021 04:00), Max: 36.9 (29 Mar 2021 00:00)  T(F): 98.5 (29 Mar 2021 04:00), Max: 98.5 (29 Mar 2021 04:00)  HR: 102 (29 Mar 2021 06:02) (73 - 132)  BP: 170/83 (29 Mar 2021 06:00) (113/64 - 174/82)  BP(mean): 104 (29 Mar 2021 06:00) (72 - 106)  ABP: --  ABP(mean): --  RR: 20 (29 Mar 2021 06:00) (20 - 49)  SpO2: 96% (29 Mar 2021 06:02) (82% - 100%)      ABG - ( 29 Mar 2021 06:52 )  pH, Arterial: 7.23  pH, Blood: x     /  pCO2: 58    /  pO2: 71    / HCO3: 24    / Base Excess: -4.7  /  SaO2: 89                    03-28 @ 07:01  -  03-29 @ 07:00  --------------------------------------------------------  IN: 910 mL / OUT: 2200 mL / NET: -1290 mL        Mode: AC/ CMV (Assist Control/ Continuous Mandatory Ventilation)  RR (machine): 20  TV (machine): 450  FiO2: 100  PEEP: 10  ITime: 1  MAP: 16  PIP: 34      PHYSICAL EXAM:    GENERAL: sedated and intuated  HEAD:  Atraumatic, Normocephalic  EYES: EOMI, PERRLA, conjunctiva and sclera clear  ENMT: No tonsillar erythema, exudates, or enlargement; blood at nostrils  NECK: Supple, normal appearance,   NERVOUS SYSTEM: sedated and intubated  CHEST/LUNG: DECREASED BREATH SOUNDSon left tube   HEART: s1,s2  No murmurs, rubs, or gallops  ABDOMEN: Soft, Nontender, Nondistended;  EXTREMITIES:  2+ Peripheral Pulses, No clubbing, cyanosis, or edema  LYMPH: No lymphadenopathy noted  SKIN: No rashes or lesions; Good capillary refill      LABS:  CBC Full  -  ( 29 Mar 2021 05:02 )  WBC Count : 23.00 K/uL  RBC Count : 5.01 M/uL  Hemoglobin : 15.8 g/dL  Hematocrit : 46.9 %  Platelet Count - Automated : 411 K/uL  Mean Cell Volume : 93.6 fl  Mean Cell Hemoglobin : 31.5 pg  Mean Cell Hemoglobin Concentration : 33.7 gm/dL  Auto Neutrophil # : x  Auto Lymphocyte # : x  Auto Monocyte # : x  Auto Eosinophil # : x  Auto Basophil # : x  Auto Neutrophil % : x  Auto Lymphocyte % : x  Auto Monocyte % : x  Auto Eosinophil % : x  Auto Basophil % : x    03-29    137  |  105  |  25<H>  ----------------------------<  138<H>  4.6   |  21<L>  |  0.71    Ca    8.7      29 Mar 2021 05:02  Phos  3.2     03-29  Mg     2.4     03-29    TPro  6.9  /  Alb  2.4<L>  /  TBili  0.8  /  DBili  x   /  AST  39  /  ALT  106<H>  /  AlkPhos  111  03-29        GOALS OF CARE DISCUSSION WITH PATIENT/FAMILY/PROXY:    CRITICAL CARE TIME SPENT: 35 minutes

## 2021-03-29 NOTE — CONSULT NOTE ADULT - CONVERSATION DETAILS
3/29/21: Reviewed status with ICU team. Attempted to contact patient's brother/Levi Lau ([phone] / [phone]). Unable to reach - first number listed just hung up x 2 with no one answered; 2nd number = not in service. Spoke with patient's nephew/Antwan - he reports patient is /no spouse or partner; has 1 son and 2 living parents who reside in [facility] but does not have any contact information for them; reports patient's brother/Rachellrio is presently admitted to a hospital 2/2 covid-19. Nephew has no additional contact information for any other relative. 3/29/21: Reviewed status with ICU team. Attempted to contact patient's brother/Levi Lau ([phone] / [phone]). Unable to reach - first number listed just hung up x 2/no one answered; 2nd number = not in service. Spoke with patient's nephew/Antwan - he reports patient is /no spouse or partner; has 1 son and 2 living parents who reside in [facility] but does not have any contact information for them; reports patient's brother/Rachellrio is presently admitted to a hospital 2/2 covid-19. Nephew has no additional contact information for any other relative.

## 2021-03-29 NOTE — CHART NOTE - NSCHARTNOTEFT_GEN_A_CORE
Pt brother called from [employer] and lives with his famaily.   pT HAS 1 FATHER IN [place] AND 1 son 29 yr old who also lives in [facility]    Pt brother majorly speaks Romanian and his name and phone no is letitia-brother  [phone]

## 2021-03-29 NOTE — CONSULT NOTE ADULT - PROBLEM SELECTOR RECOMMENDATION 3
likely related to Covid-19 infection
2/2 acute illness. Albumin 2.4. Remains NPO 2/2 critical status. High risk of skin breakdown.

## 2021-03-29 NOTE — PROGRESS NOTE ADULT - ASSESSMENT
Assessment and Recommendation:   Problem/Recommendation - 1:  Problem: COVID-19. Recommendation: isolation precautions  cont mechanical ventilation  Wean as tolerated  pulmonary toilet  NGT nutrition  ICU management.   Monitor oxygen sat  Monitor LFT, LDH, CRP, D-Dimer, Ferritin and procalcitonin  Vit C, D and zinc supp  Montelukast 10 mgs po Qhs  IV steroids.  Daily CXR   DVT and GI PPX     Problem/Recommendation - 2:  ·  Problem: UTI (urinary tract infection).  Recommendation: F.U cultures   Off Antibiotics.     Problem/Recommendation - 3:  ·  Problem: Chest pain.  Recommendation: likely related to Covid-19 infection.     Problem/Recommendation - 4:  ·  Problem: Transaminitis.  Recommendation: monitor LFTs  GI F/U     Problem/Recommendation - 5:  ·  Problem: Pneumothorax.  Recommendation: New trace right apical pneumothorax. New mild left apical pneumothorax.  Thoracic sx eval noted.  Continue to f/u CXR   Management per ICU

## 2021-03-29 NOTE — PROGRESS NOTE ADULT - SUBJECTIVE AND OBJECTIVE BOX
INTERVAL HPI/OVERNIGHT EVENTS:    Pt intubated 6am today      MEDICATIONS  (STANDING):  ALBUTerol    90 MICROgram(s) HFA Inhaler 2 Puff(s) Inhalation every 6 hours  ALPRAZolam 0.25 milliGRAM(s) Oral two times a day  ascorbic acid 1000 milliGRAM(s) Oral daily  aspirin  chewable 81 milliGRAM(s) Oral daily  chlorhexidine 2% Cloths 1 Application(s) Topical <User Schedule>  enoxaparin Injectable 40 milliGRAM(s) SubCutaneous daily  fentaNYL   Infusion. 2 MICROgram(s)/kG/Hr (16.8 mL/Hr) IV Continuous <Continuous>  insulin lispro (ADMELOG) corrective regimen sliding scale   SubCutaneous every 6 hours  methylPREDNISolone sodium succinate Injectable 40 milliGRAM(s) IV Push every 12 hours  pantoprazole   Suspension 40 milliGRAM(s) Oral daily  propofol Infusion 30 MICROgram(s)/kG/Min (15.1 mL/Hr) IV Continuous <Continuous>  sodium chloride 0.9%. 1000 milliLiter(s) (90 mL/Hr) IV Continuous <Continuous>    MEDICATIONS  (PRN):  acetaminophen   Tablet .. 650 milliGRAM(s) Oral every 6 hours PRN Temp greater or equal to 38C (100.4F)  morphine  - Injectable 2 milliGRAM(s) IV Push every 4 hours PRN air hunger  sodium chloride 0.9% lock flush 10 milliLiter(s) IV Push every 1 hour PRN Pre/post blood products, medications, blood draw, and to maintain line patency      Vital Signs Last 24 Hrs  T(C): 37.3 (29 Mar 2021 12:00), Max: 37.6 (29 Mar 2021 07:00)  T(F): 99.2 (29 Mar 2021 12:00), Max: 99.6 (29 Mar 2021 07:00)  HR: 77 (29 Mar 2021 18:00) (73 - 149)  BP: 109/54 (29 Mar 2021 18:00) (88/52 - 174/82)  BP(mean): 66 (29 Mar 2021 18:00) (58 - 106)  RR: 23 (29 Mar 2021 18:00) (18 - 43)  SpO2: 95% (29 Mar 2021 18:00) (82% - 100%)      PHYSICAL EXAM  General: sedated  Resp: intubated      I&O's Detail    28 Mar 2021 07:01  -  29 Mar 2021 07:00  --------------------------------------------------------  IN:    FentaNYL: 16.7 mL    Oral Fluid: 910 mL    Propofol: 15.1 mL  Total IN: 941.8 mL    OUT:    Indwelling Catheter - Urethral (mL): 300 mL    Voided (mL): 1900 mL  Total OUT: 2200 mL    Total NET: -1258.2 mL      29 Mar 2021 07:01  -  29 Mar 2021 18:39  --------------------------------------------------------  IN:    FentaNYL: 302.4 mL    Propofol: 235.5 mL  Total IN: 537.9 mL    OUT:    Indwelling Catheter - Urethral (mL): 510 mL  Total OUT: 510 mL    Total NET: 27.9 mL          LABS:                        15.8   23.00 )-----------( 411      ( 29 Mar 2021 05:02 )             46.9             03-29    137  |  105  |  25<H>  ----------------------------<  138<H>  4.6   |  21<L>  |  0.71    Ca    8.7      29 Mar 2021 05:02  Phos  3.2     03-29  Mg     2.4     03-29    TPro  6.9  /  Alb  2.4<L>  /  TBili  0.8  /  DBili  x   /  AST  39  /  ALT  106<H>  /  AlkPhos  111  03-29      < from: Xray Chest 1 View- PORTABLE-Urgent (Xray Chest 1 View- PORTABLE-Urgent .) (03.29.21 @ 07:42) >    IMPRESSION: Tiny left apical pneumothorax remains.    Bilateral infiltrates again noted slightly increased. Intubation.    < end of copied text >

## 2021-03-29 NOTE — CONSULT NOTE ADULT - PROBLEM SELECTOR RECOMMENDATION 9
2/2 covid-19 PNA. CXR indicates Bilateral infiltrates again noted slightly increased. WBC 23.00. Patient intubated emergently this morning; remains sedated/intubated, on solu-medrol, albuterol. PRN morphine available. Guarded prognosis. Full code.

## 2021-03-29 NOTE — CHART NOTE - NSCHARTNOTEFT_GEN_A_CORE
Assessment:   Patient is a 55y old  Male who presents with a chief complaint of SOB (29 Mar 2021 13:45). Pt intubated this day. NPO. Propofol from 24.8 ml/hr decreased to 17.9 ml/hr (if x24 hrs=473 kcaks fat).      Factors impacting intake: [ ] none [ ] nausea  [ ] vomiting [ ] diarrhea [ ] constipation  [ ]chewing problems [ ] swallowing issues  [x ] other: intubated. NPO. Sedated with Propofol    Diet Prescription: Diet, NPO (21 @ 06:02)        Daily     Daily Weight in k.5 (29 Mar 2021 07:00)  Weight in k.3 (24 Mar 2021 09:00)  Weight in k.1 (22 Mar 2021 08:00)  Weight in k.6 (21 Mar 2021 08:00)  Weight in k.8 (20 Mar 2021 09:00)  Weight in k.3 (19 Mar 2021 12:10)    % Weight Glynn: Decreasing weight    Pertinent Medications: MEDICATIONS  (STANDING):  ALBUTerol    90 MICROgram(s) HFA Inhaler 2 Puff(s) Inhalation every 6 hours  ALPRAZolam 0.25 milliGRAM(s) Oral two times a day  ascorbic acid 1000 milliGRAM(s) Oral daily  aspirin  chewable 81 milliGRAM(s) Oral daily  chlorhexidine 2% Cloths 1 Application(s) Topical <User Schedule>  enoxaparin Injectable 40 milliGRAM(s) SubCutaneous daily  fentaNYL   Infusion. 2 MICROgram(s)/kG/Hr (16.8 mL/Hr) IV Continuous <Continuous>  insulin lispro (ADMELOG) corrective regimen sliding scale   SubCutaneous every 6 hours  methylPREDNISolone sodium succinate Injectable 40 milliGRAM(s) IV Push every 12 hours  pantoprazole   Suspension 40 milliGRAM(s) Oral daily  propofol Infusion 30 MICROgram(s)/kG/Min (15.1 mL/Hr) IV Continuous <Continuous>  sodium chloride 0.9%. 1000 milliLiter(s) (90 mL/Hr) IV Continuous <Continuous>    MEDICATIONS  (PRN):  acetaminophen   Tablet .. 650 milliGRAM(s) Oral every 6 hours PRN Temp greater or equal to 38C (100.4F)  morphine  - Injectable 2 milliGRAM(s) IV Push every 4 hours PRN air hunger  sodium chloride 0.9% lock flush 10 milliLiter(s) IV Push every 1 hour PRN Pre/post blood products, medications, blood draw, and to maintain line patency    Pertinent Labs:  Na137 mmol/L Glu 138 mg/dL<H> K+ 4.6 mmol/L Cr  0.71 mg/dL BUN 25 mg/dL<H>  Phos 3.2 mg/dL  Alb 2.4 g/dL<L> 03-15 Chol 102 mg/dL LDL --    HDL 37 mg/dL<L> Trig 100 mg/dL     CAPILLARY BLOOD GLUCOSE      POCT Blood Glucose.: 118 mg/dL (29 Mar 2021 11:27)  POCT Blood Glucose.: 111 mg/dL (28 Mar 2021 21:54)        Estimated Needs:   [ ] no change since previous assessment  [ x] recalculated: At 74.5 kg Kcal needs ~20-25 kcals/ kg (1094-3949 kcals/ day. Estimated protein needs ! 1-1.3 gms/ kg=75- 97 gms/day.      Previous Nutrition Diagnosis:   [ ] Altered GI function  [ ]Inadequate Oral Intake [ ] Swallowing Difficulty   [ ] Altered nutrition related labs [ ] Increased Nutrient Needs [ ] Overweight/Obesity   [ ] Unintended Weight Loss [ ] Food & Nutrition Related Knowledge Deficit [x ] Malnutrition (moderate)  [ ] Other:     Nutrition Diagnosis is [x ] ongoing  [ ] resolved [ ] not applicable     New Nutrition Diagnosis: [ ] not applicable       Interventions:   Recommend  [ ] Change Diet To:  [ ] Nutrition Supplement  [x ] Nutrition Support: Diet advancement per MD. Consider initiation TF, as medically feasible (Glucerna 1.5 10 ml/hr, increasing up to 30 ml/hr if continuing Propofol). MD to monitor. RD available.   [x ] Other: Triglyceride level monitoring on Propofol     Monitoring and Evaluation:   [ x ] Tolerance to diet prescription [ x ] weights [ x ] labs[ x ] follow up per protocol  [ ] other:

## 2021-03-29 NOTE — PROGRESS NOTE ADULT - ASSESSMENT
55 yoM with COVID PNA and L tiny apical pneumothorax    pt intubated 6AM, XRs on 7AM and 5PM with no obvious increase of ptx  no immediate need for CT at this time, recommend close monitoring with serial CXRs  plans discussed with Dr. Melchor who agrees  will continue to follow

## 2021-03-29 NOTE — PROGRESS NOTE ADULT - ATTENDING COMMENTS
55 yr old  man , non smoker with  moody 1990s presented 3/14 with x9 days worsening cough, subjective fevers, and SOB, with x2-3 days dysuria and central, non-radiating, constant CP. Admitted to medicine unit  for acute hypoxic respiratory failure secondary to pna from covid-19 infection .     Assessment:  1. Acute hypoxic respiratory failure  2 Covid-19 infection   3. Transaminitis  4. Prediabetes  5. Bilateral pneumothorax    Plan   -pat intubated AM 3/29   -small apical left PTX with subcutaneous emphysema   -lower PEEP   -adjust vent as per ABG  -Thoracic surgery  no intervention   -Repeated CXR  is worst and monitor respiratory status  -isolation : contact and air borne   -monitor biomarkers daily, trending down   -trial of semi- pulse steroid .. solumedrol 250 bid x 3/3 days   -dvt/gi prophy  -not a candidate for remdesivir due to covid-19 antibodies and elevated LFT  -not a candidate for Tociluzimab due to elevated LFT  -hemodynamic monitoring   -NGT  -icu team do not have any info on immediate family, pleaase refer to palliative care note today for more details 55 yr old  man , non smoker with  moody 1990s presented 3/14 with x9 days worsening cough, subjective fevers, and SOB, with x2-3 days dysuria and central, non-radiating, constant CP. Admitted to medicine unit  for acute hypoxic respiratory failure secondary to pna from covid-19 infection .     Assessment:  1. Acute hypoxic respiratory failure  2 Covid-19 infection   3. Transaminitis  4. Prediabetes  5. Bilateral pneumothorax    Plan   -pat intubated AM 3/29   -small apical left PTX with subcutaneous emphysema   -lower PEEP   -cxr 12h to monitor PTX  -adjust vent as per ABG  -Thoracic surgery  no intervention   -Repeated CXR  is worst and monitor respiratory status  -isolation : contact and air borne   -monitor biomarkers daily, trending down   -trial of semi- pulse steroid .. solumedrol 250 bid x 3/3 days   -dvt/gi prophy  -not a candidate for remdesivir due to covid-19 antibodies and elevated LFT  -not a candidate for Tociluzimab due to elevated LFT  -hemodynamic monitoring   -NGT  -icu team do not have any info on immediate family, pleaase refer to palliative care note today for more details

## 2021-03-29 NOTE — CONSULT NOTE ADULT - PROBLEM SELECTOR RECOMMENDATION 2
panculture  antibiotics
2/2 covid-19. CXR indicates Bilateral infiltrates again noted slightly increased. WBC 23.00. Patient intubated emergently this morning; remains sedated/intubated, on solu-medrol, albuterol. PRN morphine available. Guarded prognosis. Full code.

## 2021-03-30 LAB
ALBUMIN SERPL ELPH-MCNC: 2.1 G/DL — LOW (ref 3.5–5)
ALP SERPL-CCNC: 87 U/L — SIGNIFICANT CHANGE UP (ref 40–120)
ALT FLD-CCNC: 118 U/L DA — HIGH (ref 10–60)
ANION GAP SERPL CALC-SCNC: 8 MMOL/L — SIGNIFICANT CHANGE UP (ref 5–17)
AST SERPL-CCNC: 46 U/L — HIGH (ref 10–40)
BASE EXCESS BLDA CALC-SCNC: 0.1 MMOL/L — SIGNIFICANT CHANGE UP (ref -2–2)
BILIRUB SERPL-MCNC: 0.5 MG/DL — SIGNIFICANT CHANGE UP (ref 0.2–1.2)
BLOOD GAS COMMENTS ARTERIAL: SIGNIFICANT CHANGE UP
BUN SERPL-MCNC: 34 MG/DL — HIGH (ref 7–18)
CALCIUM SERPL-MCNC: 8.3 MG/DL — LOW (ref 8.4–10.5)
CHLORIDE SERPL-SCNC: 106 MMOL/L — SIGNIFICANT CHANGE UP (ref 96–108)
CO2 SERPL-SCNC: 26 MMOL/L — SIGNIFICANT CHANGE UP (ref 22–31)
CREAT SERPL-MCNC: 0.75 MG/DL — SIGNIFICANT CHANGE UP (ref 0.5–1.3)
FERRITIN SERPL-MCNC: 1526 NG/ML — HIGH (ref 30–400)
FERRITIN SERPL-MCNC: 2410 NG/ML — HIGH (ref 30–400)
GLUCOSE BLDC GLUCOMTR-MCNC: 111 MG/DL — HIGH (ref 70–99)
GLUCOSE BLDC GLUCOMTR-MCNC: 124 MG/DL — HIGH (ref 70–99)
GLUCOSE BLDC GLUCOMTR-MCNC: 126 MG/DL — HIGH (ref 70–99)
GLUCOSE SERPL-MCNC: 126 MG/DL — HIGH (ref 70–99)
HCO3 BLDA-SCNC: 25 MMOL/L — SIGNIFICANT CHANGE UP (ref 23–27)
HCT VFR BLD CALC: 40.8 % — SIGNIFICANT CHANGE UP (ref 39–50)
HGB BLD-MCNC: 13.5 G/DL — SIGNIFICANT CHANGE UP (ref 13–17)
HOROWITZ INDEX BLDA+IHG-RTO: 100 — SIGNIFICANT CHANGE UP
MAGNESIUM SERPL-MCNC: 2.7 MG/DL — HIGH (ref 1.6–2.6)
MCHC RBC-ENTMCNC: 32.5 PG — SIGNIFICANT CHANGE UP (ref 27–34)
MCHC RBC-ENTMCNC: 33.1 GM/DL — SIGNIFICANT CHANGE UP (ref 32–36)
MCV RBC AUTO: 98.3 FL — SIGNIFICANT CHANGE UP (ref 80–100)
NRBC # BLD: 0 /100 WBCS — SIGNIFICANT CHANGE UP (ref 0–0)
PCO2 BLDA: 47 MMHG — HIGH (ref 32–46)
PH BLDA: 7.35 — SIGNIFICANT CHANGE UP (ref 7.35–7.45)
PHOSPHATE SERPL-MCNC: 4.5 MG/DL — SIGNIFICANT CHANGE UP (ref 2.5–4.5)
PLATELET # BLD AUTO: 256 K/UL — SIGNIFICANT CHANGE UP (ref 150–400)
PO2 BLDA: 174 MMHG — HIGH (ref 74–108)
POTASSIUM SERPL-MCNC: 5.2 MMOL/L — SIGNIFICANT CHANGE UP (ref 3.5–5.3)
POTASSIUM SERPL-SCNC: 5.2 MMOL/L — SIGNIFICANT CHANGE UP (ref 3.5–5.3)
PROCALCITONIN SERPL-MCNC: 0.23 NG/ML — HIGH (ref 0.02–0.1)
PROT SERPL-MCNC: 5.7 G/DL — LOW (ref 6–8.3)
RBC # BLD: 4.15 M/UL — LOW (ref 4.2–5.8)
RBC # FLD: 12.9 % — SIGNIFICANT CHANGE UP (ref 10.3–14.5)
SAO2 % BLDA: 99 % — HIGH (ref 92–96)
SODIUM SERPL-SCNC: 140 MMOL/L — SIGNIFICANT CHANGE UP (ref 135–145)
WBC # BLD: 16.34 K/UL — HIGH (ref 3.8–10.5)
WBC # FLD AUTO: 16.34 K/UL — HIGH (ref 3.8–10.5)

## 2021-03-30 PROCEDURE — 71045 X-RAY EXAM CHEST 1 VIEW: CPT | Mod: 26

## 2021-03-30 PROCEDURE — 99231 SBSQ HOSP IP/OBS SF/LOW 25: CPT

## 2021-03-30 RX ORDER — FENTANYL CITRATE 50 UG/ML
4 INJECTION INTRAVENOUS
Qty: 2500 | Refills: 0 | Status: DISCONTINUED | OUTPATIENT
Start: 2021-03-30 | End: 2021-04-06

## 2021-03-30 RX ORDER — PROPOFOL 10 MG/ML
30.01 INJECTION, EMULSION INTRAVENOUS
Qty: 1000 | Refills: 0 | Status: DISCONTINUED | OUTPATIENT
Start: 2021-03-30 | End: 2021-04-04

## 2021-03-30 RX ORDER — PROPOFOL 10 MG/ML
30.01 INJECTION, EMULSION INTRAVENOUS
Qty: 1000 | Refills: 0 | Status: DISCONTINUED | OUTPATIENT
Start: 2021-03-30 | End: 2021-03-30

## 2021-03-30 RX ORDER — FENTANYL CITRATE 50 UG/ML
3 INJECTION INTRAVENOUS
Qty: 2500 | Refills: 0 | Status: DISCONTINUED | OUTPATIENT
Start: 2021-03-30 | End: 2021-03-30

## 2021-03-30 RX ORDER — CISATRACURIUM BESYLATE 2 MG/ML
3 INJECTION INTRAVENOUS
Qty: 200 | Refills: 0 | Status: DISCONTINUED | OUTPATIENT
Start: 2021-03-30 | End: 2021-03-30

## 2021-03-30 RX ORDER — FENTANYL CITRATE 50 UG/ML
3 INJECTION INTRAVENOUS
Qty: 2500 | Refills: 0 | Status: DISCONTINUED | OUTPATIENT
Start: 2021-03-29 | End: 2021-03-30

## 2021-03-30 RX ADMIN — PANTOPRAZOLE SODIUM 40 MILLIGRAM(S): 20 TABLET, DELAYED RELEASE ORAL at 11:02

## 2021-03-30 RX ADMIN — CHLORHEXIDINE GLUCONATE 1 APPLICATION(S): 213 SOLUTION TOPICAL at 05:06

## 2021-03-30 RX ADMIN — Medication 40 MILLIGRAM(S): at 05:07

## 2021-03-30 RX ADMIN — ALBUTEROL 2 PUFF(S): 90 AEROSOL, METERED ORAL at 22:18

## 2021-03-30 RX ADMIN — FENTANYL CITRATE 25.2 MICROGRAM(S)/KG/HR: 50 INJECTION INTRAVENOUS at 08:00

## 2021-03-30 RX ADMIN — PROPOFOL 15.1 MICROGRAM(S)/KG/MIN: 10 INJECTION, EMULSION INTRAVENOUS at 10:57

## 2021-03-30 RX ADMIN — ENOXAPARIN SODIUM 40 MILLIGRAM(S): 100 INJECTION SUBCUTANEOUS at 11:02

## 2021-03-30 RX ADMIN — PROPOFOL 15.1 MICROGRAM(S)/KG/MIN: 10 INJECTION, EMULSION INTRAVENOUS at 05:06

## 2021-03-30 RX ADMIN — Medication 81 MILLIGRAM(S): at 11:01

## 2021-03-30 RX ADMIN — PROPOFOL 15.1 MICROGRAM(S)/KG/MIN: 10 INJECTION, EMULSION INTRAVENOUS at 10:29

## 2021-03-30 RX ADMIN — PROPOFOL 15.1 MICROGRAM(S)/KG/MIN: 10 INJECTION, EMULSION INTRAVENOUS at 16:59

## 2021-03-30 RX ADMIN — FENTANYL CITRATE 25.2 MICROGRAM(S)/KG/HR: 50 INJECTION INTRAVENOUS at 16:58

## 2021-03-30 RX ADMIN — Medication 1000 MILLIGRAM(S): at 11:01

## 2021-03-30 RX ADMIN — ALBUTEROL 2 PUFF(S): 90 AEROSOL, METERED ORAL at 02:13

## 2021-03-30 RX ADMIN — ALBUTEROL 2 PUFF(S): 90 AEROSOL, METERED ORAL at 15:05

## 2021-03-30 RX ADMIN — FENTANYL CITRATE 25.2 MICROGRAM(S)/KG/HR: 50 INJECTION INTRAVENOUS at 10:55

## 2021-03-30 RX ADMIN — ALBUTEROL 2 PUFF(S): 90 AEROSOL, METERED ORAL at 09:47

## 2021-03-30 RX ADMIN — Medication 40 MILLIGRAM(S): at 11:01

## 2021-03-30 NOTE — PROGRESS NOTE ADULT - SUBJECTIVE AND OBJECTIVE BOX
Patient is a 55y old  Male who presents with a chief complaint of SOB (30 Mar 2021 09:04)  Patient is intubated, sedated, on mechanical ventilation but in NAD. remains on 100% FIO2 with peep of 10    INTERVAL HPI/OVERNIGHT EVENTS:      VITAL SIGNS:  T(F): 98.4 (03-30-21 @ 08:00)  HR: 78 (03-30-21 @ 10:00)  BP: 109/56 (03-30-21 @ 08:00)  RR: 24 (03-30-21 @ 10:00)  SpO2: 99% (03-30-21 @ 10:00)  Wt(kg): --  I&O's Detail    29 Mar 2021 07:01  -  30 Mar 2021 07:00  --------------------------------------------------------  IN:    FentaNYL: 554.4 mL    FentaNYL: 26 mL    Propofol: 414.5 mL  Total IN: 994.9 mL    OUT:    Indwelling Catheter - Urethral (mL): 1360 mL  Total OUT: 1360 mL    Total NET: -365.1 mL      30 Mar 2021 07:01  -  30 Mar 2021 11:29  --------------------------------------------------------  IN:    FentaNYL: 78 mL    Propofol: 54 mL  Total IN: 132 mL    OUT:    Indwelling Catheter - Urethral (mL): 50 mL  Total OUT: 50 mL    Total NET: 82 mL        Mode: AC/ CMV (Assist Control/ Continuous Mandatory Ventilation)  RR (machine): 24  TV (machine): 500  FiO2: 100  PEEP: 10  ITime: 0.8  MAP: 17  PIP: 37        REVIEW OF SYSTEMS:    CONSTITUTIONAL:  No fevers, chills, sweats    HEENT:  Eyes:  No diplopia or blurred vision. ENT:  No earache, sore throat or runny nose.    CARDIOVASCULAR:  No pressure, squeezing, tightness, or heaviness about the chest; no palpitations.    RESPIRATORY:  Per HPI    GASTROINTESTINAL:  No abdominal pain, nausea, vomiting or diarrhea.    GENITOURINARY:  No dysuria, frequency or urgency.    NEUROLOGIC:  No paresthesias, fasciculations, seizures or weakness.    PSYCHIATRIC:  No disorder of thought or mood.      PHYSICAL EXAM:    Constitutional: Well developed and nourished  Eyes:Perrla  ENMT: normal  Neck:supple  Respiratory: rales bilzterally  Cardiovascular: S1 S2 regular  Gastrointestinal: Soft, Non tender  Extremities: No edema  Vascular:normal  Neurological:Awake, alert,Ox3  Musculoskeletal:Normal      MEDICATIONS  (STANDING):  ALBUTerol    90 MICROgram(s) HFA Inhaler 2 Puff(s) Inhalation every 6 hours  ascorbic acid 1000 milliGRAM(s) Oral daily  aspirin  chewable 81 milliGRAM(s) Oral daily  chlorhexidine 2% Cloths 1 Application(s) Topical <User Schedule>  enoxaparin Injectable 40 milliGRAM(s) SubCutaneous daily  fentaNYL   Infusion. 3 MICROgram(s)/kG/Hr (25.2 mL/Hr) IV Continuous <Continuous>  insulin lispro (ADMELOG) corrective regimen sliding scale   SubCutaneous every 6 hours  methylPREDNISolone sodium succinate Injectable 40 milliGRAM(s) IV Push daily  pantoprazole   Suspension 40 milliGRAM(s) Oral daily  propofol Infusion 30.007 MICROgram(s)/kG/Min (15.1 mL/Hr) IV Continuous <Continuous>  sodium chloride 0.9%. 1000 milliLiter(s) (90 mL/Hr) IV Continuous <Continuous>    MEDICATIONS  (PRN):  acetaminophen   Tablet .. 650 milliGRAM(s) Oral every 6 hours PRN Temp greater or equal to 38C (100.4F)  sodium chloride 0.9% lock flush 10 milliLiter(s) IV Push every 1 hour PRN Pre/post blood products, medications, blood draw, and to maintain line patency      Allergies    No Known Allergies    Intolerances        LABS:                        13.5   16.34 )-----------( 256      ( 30 Mar 2021 04:06 )             40.8     03-30    140  |  106  |  34<H>  ----------------------------<  126<H>  5.2   |  26  |  0.75    Ca    8.3<L>      30 Mar 2021 04:06  Phos  4.5     03-30  Mg     2.7     03-30    TPro  5.7<L>  /  Alb  2.1<L>  /  TBili  0.5  /  DBili  x   /  AST  46<H>  /  ALT  118<H>  /  AlkPhos  87  03-30    PT/INR - ( 29 Mar 2021 12:54 )   PT: 13.5 sec;   INR: 1.14 ratio         PTT - ( 29 Mar 2021 12:54 )  PTT:26.2 sec    ABG - ( 30 Mar 2021 10:23 )  pH, Arterial: 7.35  pH, Blood: x     /  pCO2: 47    /  pO2: 174   / HCO3: 25    / Base Excess: 0.1   /  SaO2: 99                    CAPILLARY BLOOD GLUCOSE      POCT Blood Glucose.: 124 mg/dL (30 Mar 2021 11:21)  POCT Blood Glucose.: 141 mg/dL (29 Mar 2021 23:18)  POCT Blood Glucose.: 112 mg/dL (29 Mar 2021 17:20)    pro-bnp -- 03-28 @ 03:54     d-dimer 224  03-28 @ 03:54  pro-bnp -- 03-25 @ 05:41     d-dimer 242  03-25 @ 05:41      RADIOLOGY & ADDITIONAL TESTS:    CXR:  < from: Xray Chest 1 View- PORTABLE-Urgent (Xray Chest 1 View- PORTABLE-Urgent .) (03.29.21 @ 17:54) >  IMPRESSION: Pneumomediastinum.  LEFT greater than RIGHT persistent diffuse airspace disease.  ET tube tip above trachea bifurcation.      < end of copied text >    Ct scan chest:    ekg;    echo:

## 2021-03-30 NOTE — PROGRESS NOTE ADULT - SUBJECTIVE AND OBJECTIVE BOX
INTERVAL HPI/OVERNIGHT EVENTS: ***    PRESSORS: [ ] YES [ ] NO  WHICH:    ANTIBIOTICS:                      Antimicrobial:    Cardiovascular:    Pulmonary:  ALBUTerol    90 MICROgram(s) HFA Inhaler 2 Puff(s) Inhalation every 6 hours    Hematalogic:  aspirin  chewable 81 milliGRAM(s) Oral daily  enoxaparin Injectable 40 milliGRAM(s) SubCutaneous daily    Other:  acetaminophen   Tablet .. 650 milliGRAM(s) Oral every 6 hours PRN  ascorbic acid 1000 milliGRAM(s) Oral daily  chlorhexidine 2% Cloths 1 Application(s) Topical <User Schedule>  fentaNYL   Infusion. 3 MICROgram(s)/kG/Hr IV Continuous <Continuous>  insulin lispro (ADMELOG) corrective regimen sliding scale   SubCutaneous every 6 hours  methylPREDNISolone sodium succinate Injectable 40 milliGRAM(s) IV Push every 12 hours  morphine  - Injectable 2 milliGRAM(s) IV Push every 4 hours PRN  pantoprazole   Suspension 40 milliGRAM(s) Oral daily  propofol Infusion 30 MICROgram(s)/kG/Min IV Continuous <Continuous>  sodium chloride 0.9% lock flush 10 milliLiter(s) IV Push every 1 hour PRN  sodium chloride 0.9%. 1000 milliLiter(s) IV Continuous <Continuous>    acetaminophen   Tablet .. 650 milliGRAM(s) Oral every 6 hours PRN  ALBUTerol    90 MICROgram(s) HFA Inhaler 2 Puff(s) Inhalation every 6 hours  ascorbic acid 1000 milliGRAM(s) Oral daily  aspirin  chewable 81 milliGRAM(s) Oral daily  chlorhexidine 2% Cloths 1 Application(s) Topical <User Schedule>  enoxaparin Injectable 40 milliGRAM(s) SubCutaneous daily  fentaNYL   Infusion. 3 MICROgram(s)/kG/Hr IV Continuous <Continuous>  insulin lispro (ADMELOG) corrective regimen sliding scale   SubCutaneous every 6 hours  methylPREDNISolone sodium succinate Injectable 40 milliGRAM(s) IV Push every 12 hours  morphine  - Injectable 2 milliGRAM(s) IV Push every 4 hours PRN  pantoprazole   Suspension 40 milliGRAM(s) Oral daily  propofol Infusion 30 MICROgram(s)/kG/Min IV Continuous <Continuous>  sodium chloride 0.9% lock flush 10 milliLiter(s) IV Push every 1 hour PRN  sodium chloride 0.9%. 1000 milliLiter(s) IV Continuous <Continuous>    Drug Dosing Weight  Height (cm): 167.6 (14 Mar 2021 12:03)  Weight (kg): 83.869 (14 Mar 2021 12:03)  BMI (kg/m2): 29.9 (14 Mar 2021 12:03)  BSA (m2): 1.93 (14 Mar 2021 12:03)    CENTRAL LINE: [ ] YES [ ] NO  LOCATION:   DATE INSERTED:  REMOVE: [ ] YES [ ] NO  EXPLAIN:    RIVERA: [ ] YES [ ] NO    DATE INSERTED:  REMOVE:  [ ] YES [ ] NO  EXPLAIN:    A-LINE:  [ ] YES [ ] NO  LOCATION:   DATE INSERTED:  REMOVE:  [ ] YES [ ] NO  EXPLAIN:    PMH -reviewed admission note, no change since admission    ICU Vital Signs Last 24 Hrs  T(C): 36.9 (30 Mar 2021 08:00), Max: 37.3 (29 Mar 2021 12:00)  T(F): 98.4 (30 Mar 2021 08:00), Max: 99.2 (29 Mar 2021 12:00)  HR: 78 (30 Mar 2021 08:27) (71 - 97)  BP: 109/56 (30 Mar 2021 08:00) (88/52 - 124/67)  BP(mean): 68 (30 Mar 2021 08:00) (58 - 79)  ABP: 93/66 (30 Mar 2021 08:00) (93/66 - 117/59)  ABP(mean): 77 (30 Mar 2021 08:00) (70 - 79)  RR: 24 (30 Mar 2021 08:00) (18 - 30)  SpO2: 99% (30 Mar 2021 08:27) (89% - 100%)      ABG - ( 29 Mar 2021 09:16 )  pH, Arterial: 7.30  pH, Blood: x     /  pCO2: 47    /  pO2: 71    / HCO3: 22    / Base Excess: -3.7  /  SaO2: 90                    03-29 @ 07:01  -  03-30 @ 07:00  --------------------------------------------------------  IN: 994.9 mL / OUT: 1360 mL / NET: -365.1 mL        Mode: AC/ CMV (Assist Control/ Continuous Mandatory Ventilation)  RR (machine): 24  TV (machine): 500  FiO2: 100  PEEP: 10  ITime: 0.8  MAP: 17  PIP: 37      PHYSICAL EXAM:    GENERAL: NAD, well-groomed, well-developed  HEAD:  Atraumatic, Normocephalic  EYES: EOMI, PERRLA, conjunctiva and sclera clear  ENMT: No tonsillar erythema, exudates, or enlargement; Moist mucous membranes, Good dentition, No lesions  NECK: Supple, normal appearance, No JVD; Normal thyroid; Trachea midline  NERVOUS SYSTEM:  Alert & Oriented X3, Good concentration; Motor Strength 5/5 B/L upper and lower extremities; DTRs 2+ intact and symmetric  CHEST/LUNG: No chest deformity; Normal percussion bilaterally; No rales, rhonchi, wheezing   HEART: Regular rate and rhythm; No murmurs, rubs, or gallops  ABDOMEN: Soft, Nontender, Nondistended; Bowel sounds present  EXTREMITIES:  2+ Peripheral Pulses, No clubbing, cyanosis, or edema  LYMPH: No lymphadenopathy noted  SKIN: No rashes or lesions; Good capillary refill      LABS:  CBC Full  -  ( 30 Mar 2021 04:06 )  WBC Count : 16.34 K/uL  RBC Count : 4.15 M/uL  Hemoglobin : 13.5 g/dL  Hematocrit : 40.8 %  Platelet Count - Automated : 256 K/uL  Mean Cell Volume : 98.3 fl  Mean Cell Hemoglobin : 32.5 pg  Mean Cell Hemoglobin Concentration : 33.1 gm/dL  Auto Neutrophil # : x  Auto Lymphocyte # : x  Auto Monocyte # : x  Auto Eosinophil # : x  Auto Basophil # : x  Auto Neutrophil % : x  Auto Lymphocyte % : x  Auto Monocyte % : x  Auto Eosinophil % : x  Auto Basophil % : x    03-30    140  |  106  |  34<H>  ----------------------------<  126<H>  5.2   |  26  |  0.75    Ca    8.3<L>      30 Mar 2021 04:06  Phos  4.5     03-30  Mg     2.7     03-30    TPro  5.7<L>  /  Alb  2.1<L>  /  TBili  0.5  /  DBili  x   /  AST  46<H>  /  ALT  118<H>  /  AlkPhos  87  03-30    PT/INR - ( 29 Mar 2021 12:54 )   PT: 13.5 sec;   INR: 1.14 ratio         PTT - ( 29 Mar 2021 12:54 )  PTT:26.2 sec        RADIOLOGY & ADDITIONAL STUDIES REVIEWED:  ***    GOALS OF CARE DISCUSSION WITH PATIENT/FAMILY/PROXY:    CRITICAL CARE TIME SPENT: 35 minutes INTERVAL HPI/OVERNIGHT EVENTS: pt was saturating         Pulmonary:  ALBUTerol    90 MICROgram(s) HFA Inhaler 2 Puff(s) Inhalation every 6 hours    Hematalogic:  aspirin  chewable 81 milliGRAM(s) Oral daily  enoxaparin Injectable 40 milliGRAM(s) SubCutaneous daily    Other:  acetaminophen   Tablet .. 650 milliGRAM(s) Oral every 6 hours PRN  ascorbic acid 1000 milliGRAM(s) Oral daily  chlorhexidine 2% Cloths 1 Application(s) Topical <User Schedule>  fentaNYL   Infusion. 3 MICROgram(s)/kG/Hr IV Continuous <Continuous>  insulin lispro (ADMELOG) corrective regimen sliding scale   SubCutaneous every 6 hours  methylPREDNISolone sodium succinate Injectable 40 milliGRAM(s) IV Push every 12 hours  morphine  - Injectable 2 milliGRAM(s) IV Push every 4 hours PRN  pantoprazole   Suspension 40 milliGRAM(s) Oral daily  propofol Infusion 30 MICROgram(s)/kG/Min IV Continuous <Continuous>  sodium chloride 0.9% lock flush 10 milliLiter(s) IV Push every 1 hour PRN  sodium chloride 0.9%. 1000 milliLiter(s) IV Continuous <Continuous>    acetaminophen   Tablet .. 650 milliGRAM(s) Oral every 6 hours PRN  ALBUTerol    90 MICROgram(s) HFA Inhaler 2 Puff(s) Inhalation every 6 hours  ascorbic acid 1000 milliGRAM(s) Oral daily  aspirin  chewable 81 milliGRAM(s) Oral daily  chlorhexidine 2% Cloths 1 Application(s) Topical <User Schedule>  enoxaparin Injectable 40 milliGRAM(s) SubCutaneous daily  fentaNYL   Infusion. 3 MICROgram(s)/kG/Hr IV Continuous <Continuous>  insulin lispro (ADMELOG) corrective regimen sliding scale   SubCutaneous every 6 hours  methylPREDNISolone sodium succinate Injectable 40 milliGRAM(s) IV Push every 12 hours  morphine  - Injectable 2 milliGRAM(s) IV Push every 4 hours PRN  pantoprazole   Suspension 40 milliGRAM(s) Oral daily  propofol Infusion 30 MICROgram(s)/kG/Min IV Continuous <Continuous>  sodium chloride 0.9% lock flush 10 milliLiter(s) IV Push every 1 hour PRN  sodium chloride 0.9%. 1000 milliLiter(s) IV Continuous <Continuous>    Drug Dosing Weight  Height (cm): 167.6 (14 Mar 2021 12:03)  Weight (kg): 83.869 (14 Mar 2021 12:03)  BMI (kg/m2): 29.9 (14 Mar 2021 12:03)  BSA (m2): 1.93 (14 Mar 2021 12:03)    CENTRAL LINE: [x ] YES [ ] NO  LOCATION: [facility]   DATE INSERTED: 3/29  REMOVE: [ ] YES [ ] NO  EXPLAIN:    RIVERA: [x ] YES [ ] NO    DATE INSERTED:  REMOVE:  [ ] YES [ ] NO  EXPLAIN:    A-LINE:  [x ] YES [ ] NO  LOCATION: [facility]  DATE INSERTED: 3/29  REMOVE:  [ ] YES [ ] NO  EXPLAIN:    PMH -reviewed admission note, no change since admission    ICU Vital Signs Last 24 Hrs  T(C): 36.9 (30 Mar 2021 08:00), Max: 37.3 (29 Mar 2021 12:00)  T(F): 98.4 (30 Mar 2021 08:00), Max: 99.2 (29 Mar 2021 12:00)  HR: 78 (30 Mar 2021 08:27) (71 - 97)  BP: 109/56 (30 Mar 2021 08:00) (88/52 - 124/67)  BP(mean): 68 (30 Mar 2021 08:00) (58 - 79)  ABP: 93/66 (30 Mar 2021 08:00) (93/66 - 117/59)  ABP(mean): 77 (30 Mar 2021 08:00) (70 - 79)  RR: 24 (30 Mar 2021 08:00) (18 - 30)  SpO2: 99% (30 Mar 2021 08:27) (89% - 100%)      ABG - ( 29 Mar 2021 09:16 )  pH, Arterial: 7.30  pH, Blood: x     /  pCO2: 47    /  pO2: 71    / HCO3: 22    / Base Excess: -3.7  /  SaO2: 90                    03-29 @ 07:01  -  03-30 @ 07:00  --------------------------------------------------------  IN: 994.9 mL / OUT: 1360 mL / NET: -365.1 mL        Mode: AC/ CMV (Assist Control/ Continuous Mandatory Ventilation)  RR (machine): 24  TV (machine): 500  FiO2: 100  PEEP: 10  ITime: 0.8  MAP: 17  PIP: 37      PHYSICAL EXAM:    GENERAL: sedated and intuated  HEAD:  Atraumatic, Normocephalic  EYES: EOMI, PERRLA, conjunctiva and sclera clear  ENMT: No tonsillar erythema, exudates, or enlargement; blood at nostrils  NECK: Supple, normal appearance,   NERVOUS SYSTEM: sedated and intubated  CHEST/LUNG: DECREASED BREATH SOUNDSon left tube   HEART: s1,s2  No murmurs, rubs, or gallops  ABDOMEN: Soft, Nontender, Nondistended;  EXTREMITIES:  2+ Peripheral Pulses, No clubbing, cyanosis, or edema  LYMPH: No lymphadenopathy noted  SKIN: No rashes or lesions; Good capillary refill      LABS:  CBC Full  -  ( 30 Mar 2021 04:06 )  WBC Count : 16.34 K/uL  RBC Count : 4.15 M/uL  Hemoglobin : 13.5 g/dL  Hematocrit : 40.8 %  Platelet Count - Automated : 256 K/uL  Mean Cell Volume : 98.3 fl  Mean Cell Hemoglobin : 32.5 pg  Mean Cell Hemoglobin Concentration : 33.1 gm/dL  Auto Neutrophil # : x  Auto Lymphocyte # : x  Auto Monocyte # : x  Auto Eosinophil # : x  Auto Basophil # : x  Auto Neutrophil % : x  Auto Lymphocyte % : x  Auto Monocyte % : x  Auto Eosinophil % : x  Auto Basophil % : x    03-30    140  |  106  |  34<H>  ----------------------------<  126<H>  5.2   |  26  |  0.75    Ca    8.3<L>      30 Mar 2021 04:06  Phos  4.5     03-30  Mg     2.7     03-30    TPro  5.7<L>  /  Alb  2.1<L>  /  TBili  0.5  /  DBili  x   /  AST  46<H>  /  ALT  118<H>  /  AlkPhos  87  03-30    PT/INR - ( 29 Mar 2021 12:54 )   PT: 13.5 sec;   INR: 1.14 ratio         PTT - ( 29 Mar 2021 12:54 )  PTT:26.2 sec        GOALS OF CARE DISCUSSION WITH PATIENT/FAMILY/PROXY: full code    CRITICAL CARE TIME SPENT: 35 minutes

## 2021-03-30 NOTE — PROGRESS NOTE ADULT - SUBJECTIVE AND OBJECTIVE BOX
Patient is a 55y old  Male who presents with a chief complaint of SOB (29 Mar 2021 18:39)    pt seen in icu [ x ], reg med floor [  ], bed [ x ], chair at bedside [   ], a+o x3 [  ], sedated [x  ],    nad [ x ]        Allergies    No Known Allergies        Vitals    T(F): 98.5 (03-30-21 @ 04:00), Max: 99.2 (03-29-21 @ 12:00)  HR: 77 (03-30-21 @ 06:00) (71 - 110)  BP: 109/64 (03-30-21 @ 06:00) (88/52 - 124/67)  RR: 24 (03-30-21 @ 06:00) (18 - 30)  SpO2: 99% (03-30-21 @ 06:00) (88% - 100%)  Wt(kg): --  CAPILLARY BLOOD GLUCOSE      POCT Blood Glucose.: 141 mg/dL (29 Mar 2021 23:18)      Labs                          13.5   16.34 )-----------( 256      ( 30 Mar 2021 04:06 )             40.8       03-30    140  |  106  |  34<H>  ----------------------------<  126<H>  5.2   |  26  |  0.75    Ca    8.3<L>      30 Mar 2021 04:06  Phos  4.5     03-30  Mg     2.7     03-30    TPro  5.7<L>  /  Alb  2.1<L>  /  TBili  0.5  /  DBili  x   /  AST  46<H>  /  ALT  118<H>  /  AlkPhos  87  03-30            .Urine Clean Catch (Midstream)  03-15 @ 00:52   No growth  --  --          Radiology Results      Meds    MEDICATIONS  (STANDING):  ALBUTerol    90 MICROgram(s) HFA Inhaler 2 Puff(s) Inhalation every 6 hours  ascorbic acid 1000 milliGRAM(s) Oral daily  aspirin  chewable 81 milliGRAM(s) Oral daily  chlorhexidine 2% Cloths 1 Application(s) Topical <User Schedule>  enoxaparin Injectable 40 milliGRAM(s) SubCutaneous daily  fentaNYL   Infusion. 3 MICROgram(s)/kG/Hr (25.2 mL/Hr) IV Continuous <Continuous>  insulin lispro (ADMELOG) corrective regimen sliding scale   SubCutaneous every 6 hours  methylPREDNISolone sodium succinate Injectable 40 milliGRAM(s) IV Push every 12 hours  pantoprazole   Suspension 40 milliGRAM(s) Oral daily  propofol Infusion 30 MICROgram(s)/kG/Min (15.1 mL/Hr) IV Continuous <Continuous>  sodium chloride 0.9%. 1000 milliLiter(s) (90 mL/Hr) IV Continuous <Continuous>      MEDICATIONS  (PRN):  acetaminophen   Tablet .. 650 milliGRAM(s) Oral every 6 hours PRN Temp greater or equal to 38C (100.4F)  morphine  - Injectable 2 milliGRAM(s) IV Push every 4 hours PRN air hunger  sodium chloride 0.9% lock flush 10 milliLiter(s) IV Push every 1 hour PRN Pre/post blood products, medications, blood draw, and to maintain line patency      Physical Exam    Neuro :  no focal deficits  Respiratory: CTA B/L  CV: RRR, S1S2, no murmurs,   Abdominal: Soft, NT, ND +BS,  Extremities: No edema, + peripheral pulses    ASSESSMENT    Hypoxemia 2nd to covid pna   transaminitis  prediabetes  h/o appendectomy  cholecystectomy        PLAN    contact and airborne isolation  d/c remdesevir given covid ab positive noted   completed dexamethasone   started pulse steroids for 3 days - 250mg solumedrol bid  cont asa, vit c,    cont albuterol inhaler   pulm f/u  procalcitonin, D-dimer, crp, ldh, ferritin, lactate noted ,    cont tylenol prn,   cont robitussin prn   pt on fentanyl and propofol drip  s/p intubation 3/29/21  O2 sat (89% - 96%) mech vent  O2 via mech vent   xray 3/19/21 with pneumomediastinum  rept cxr with New trace right apical pneumothorax. New mild left apical pneumothorax. Grossly stable small pneumomediastinum.  Soft tissue emphysema at the neck bases bilaterally. Grossly stable bilateral pulmonary infiltrates noted.   cxr 2/24 with No evidence of pneumothorax can be appreciated on the available image. This may be related to patient positioning. Evidence of pneumomediastinum and subcutaneous emphysema in the lower neck is again noted. There are patchy bibasilar infiltrates and elevated right hemidiaphragm noted.   cxr 3/29/21 with No significant change bilateral infiltrates. There is a small simple left apical pneumothorax. No significant pleural effusion. Bilateral subcutaneous emphysema similar to prior.  thoracic surg f/u   f/u daily cxr  No thoracic intervention unless pneumothorax worsens and/or pt requires intubation  f/u acute hepatitis panel  lispro ss  cont current meds  cont mgmt as per icu       Patient is a 55y old  Male who presents with a chief complaint of SOB (29 Mar 2021 18:39)    pt seen in icu [ x ], reg med floor [  ], bed [ x ], chair at bedside [   ], a+o x3 [  ], sedated [x  ],    nad [ x ]        Allergies    No Known Allergies        Vitals    T(F): 98.5 (03-30-21 @ 04:00), Max: 99.2 (03-29-21 @ 12:00)  HR: 77 (03-30-21 @ 06:00) (71 - 110)  BP: 109/64 (03-30-21 @ 06:00) (88/52 - 124/67)  RR: 24 (03-30-21 @ 06:00) (18 - 30)  SpO2: 99% (03-30-21 @ 06:00) (88% - 100%)  Wt(kg): --  CAPILLARY BLOOD GLUCOSE      POCT Blood Glucose.: 141 mg/dL (29 Mar 2021 23:18)      Labs                          13.5   16.34 )-----------( 256      ( 30 Mar 2021 04:06 )             40.8       03-30    140  |  106  |  34<H>  ----------------------------<  126<H>  5.2   |  26  |  0.75    Ca    8.3<L>      30 Mar 2021 04:06  Phos  4.5     03-30  Mg     2.7     03-30    TPro  5.7<L>  /  Alb  2.1<L>  /  TBili  0.5  /  DBili  x   /  AST  46<H>  /  ALT  118<H>  /  AlkPhos  87  03-30            .Urine Clean Catch (Midstream)  03-15 @ 00:52   No growth  --  --          Radiology Results      Meds    MEDICATIONS  (STANDING):  ALBUTerol    90 MICROgram(s) HFA Inhaler 2 Puff(s) Inhalation every 6 hours  ascorbic acid 1000 milliGRAM(s) Oral daily  aspirin  chewable 81 milliGRAM(s) Oral daily  chlorhexidine 2% Cloths 1 Application(s) Topical <User Schedule>  enoxaparin Injectable 40 milliGRAM(s) SubCutaneous daily  fentaNYL   Infusion. 3 MICROgram(s)/kG/Hr (25.2 mL/Hr) IV Continuous <Continuous>  insulin lispro (ADMELOG) corrective regimen sliding scale   SubCutaneous every 6 hours  methylPREDNISolone sodium succinate Injectable 40 milliGRAM(s) IV Push every 12 hours  pantoprazole   Suspension 40 milliGRAM(s) Oral daily  propofol Infusion 30 MICROgram(s)/kG/Min (15.1 mL/Hr) IV Continuous <Continuous>  sodium chloride 0.9%. 1000 milliLiter(s) (90 mL/Hr) IV Continuous <Continuous>      MEDICATIONS  (PRN):  acetaminophen   Tablet .. 650 milliGRAM(s) Oral every 6 hours PRN Temp greater or equal to 38C (100.4F)  morphine  - Injectable 2 milliGRAM(s) IV Push every 4 hours PRN air hunger  sodium chloride 0.9% lock flush 10 milliLiter(s) IV Push every 1 hour PRN Pre/post blood products, medications, blood draw, and to maintain line patency      Physical Exam    Neuro :  no focal deficits  Respiratory: CTA B/L  CV: RRR, S1S2, no murmurs,   Abdominal: Soft, NT, ND +BS,  Extremities: No edema, + peripheral pulses    ASSESSMENT    Hypoxemia 2nd to covid pna   transaminitis  prediabetes  h/o appendectomy  cholecystectomy        PLAN    contact and airborne isolation  d/c remdesevir given covid ab positive noted   completed dexamethasone   started pulse steroids for 3 days - 250mg solumedrol bid  cont asa, vit c,    cont albuterol inhaler   pulm f/u  procalcitonin, D-dimer, crp, ldh, ferritin, lactate noted ,    cont tylenol prn,   cont robitussin prn   pt on fentanyl and propofol drip  s/p intubation 3/29/21  O2 sat (88% - 100%) mech vent  O2 via mech vent   xray 3/19/21 with pneumomediastinum  rept cxr with New trace right apical pneumothorax. New mild left apical pneumothorax. Grossly stable small pneumomediastinum.  Soft tissue emphysema at the neck bases bilaterally. Grossly stable bilateral pulmonary infiltrates noted.   cxr 2/24 with No evidence of pneumothorax can be appreciated on the available image. This may be related to patient positioning. Evidence of pneumomediastinum and subcutaneous emphysema in the lower neck is again noted. There are patchy bibasilar infiltrates and elevated right hemidiaphragm noted.   cxr 3/29/21 with No significant change bilateral infiltrates. There is a small simple left apical pneumothorax. No significant pleural effusion. Bilateral subcutaneous emphysema similar to prior.   thoracic surg f/u   pt intubated 6AM, XRs on 7AM and 5PM with no obvious increase of ptx  no immediate need for CT at this time,   recommend close monitoring with serial CXRscxr  lispro ss  cont current meds  cont mgmt as per icu

## 2021-03-30 NOTE — PROGRESS NOTE ADULT - ASSESSMENT
54 yo M with COVID PNA and L tiny apical pneumothorax    -F/u AM CXR   -Daily CXRs   -Will follow   -Care as per ICU

## 2021-03-30 NOTE — PROGRESS NOTE ADULT - SUBJECTIVE AND OBJECTIVE BOX
INTERVAL HPI/OVERNIGHT EVENTS:    Pt seen and examined at bedside. Remains intubated and sedated.   Vent settings appreciated.    Vital Signs Last 24 Hrs  T(C): 36.9 (30 Mar 2021 08:00), Max: 37.3 (29 Mar 2021 12:00)  T(F): 98.4 (30 Mar 2021 08:00), Max: 99.2 (29 Mar 2021 12:00)  HR: 78 (30 Mar 2021 08:27) (71 - 97)  BP: 109/56 (30 Mar 2021 08:00) (91/51 - 124/67)  BP(mean): 68 (30 Mar 2021 08:00) (58 - 79)  RR: 24 (30 Mar 2021 08:00) (18 - 30)  SpO2: 99% (30 Mar 2021 08:27) (89% - 100%)  I&O's Detail    29 Mar 2021 07:01  -  30 Mar 2021 07:00  --------------------------------------------------------  IN:    FentaNYL: 554.4 mL    FentaNYL: 26 mL    Propofol: 414.5 mL  Total IN: 994.9 mL    OUT:    Indwelling Catheter - Urethral (mL): 1360 mL  Total OUT: 1360 mL    Total NET: -365.1 mL      30 Mar 2021 07:01  -  30 Mar 2021 09:04  --------------------------------------------------------  IN:    FentaNYL: 26 mL    Propofol: 18 mL  Total IN: 44 mL    OUT:    Indwelling Catheter - Urethral (mL): 50 mL  Total OUT: 50 mL    Total NET: -6 mL        pantoprazole   Suspension 40 milliGRAM(s) Oral daily      Physical Exam  General: sedated  Resp: intubated    Labs:                        13.5   16.34 )-----------( 256      ( 30 Mar 2021 04:06 )             40.8     03-30    140  |  106  |  34<H>  ----------------------------<  126<H>  5.2   |  26  |  0.75    Ca    8.3<L>      30 Mar 2021 04:06  Phos  4.5     03-30  Mg     2.7     03-30    TPro  5.7<L>  /  Alb  2.1<L>  /  TBili  0.5  /  DBili  x   /  AST  46<H>  /  ALT  118<H>  /  AlkPhos  87  03-30    PT/INR - ( 29 Mar 2021 12:54 )   PT: 13.5 sec;   INR: 1.14 ratio         PTT - ( 29 Mar 2021 12:54 )  PTT:26.2 sec

## 2021-03-30 NOTE — PROGRESS NOTE ADULT - ATTENDING COMMENTS
55 yr old  man , non smoker with  moody 1990s presented 3/14 with x9 days worsening cough, subjective fevers, and SOB, with x2-3 days dysuria and central, non-radiating, constant CP. Admitted to medicine unit  for acute hypoxic respiratory failure secondary to pna from covid-19 infection .     Assessment:  1. Acute hypoxic respiratory failure  2 Covid-19 infection   3. Transaminitis  4. Prediabetes  5. Bilateral pneumothorax    Plan   -pat intubated AM 3/29   -small apical left PTX with subcutaneous emphysema   -lower PEEP   -cxr 12h to monitor PTX  -adjust vent as per ABG  -Thoracic surgery  no intervention   -Repeated CXR  is worst and monitor respiratory status  -isolation : contact and air borne   -monitor biomarkers daily, trending down   -trial of semi- pulse steroid .. solumedrol 250 bid x 3/3 days   -dvt/gi prophy  -not a candidate for remdesivir due to covid-19 antibodies and elevated LFT  -not a candidate for Tociluzimab due to elevated LFT  -hemodynamic monitoring   -NGT

## 2021-03-30 NOTE — PROGRESS NOTE ADULT - ASSESSMENT
ASSESSMENT AND PLAN:  55M, no PMH, PSH appy and moody 1990s presented 3/14 with x9 days worsening cough, subjective fevers, and SOB, with x2-3 days dysuria and central, non-radiating, constant CP. Admitted to [facility] for acute hypoxic respiratory failure s/t covid pneumonia, ICU consulted for increasing work of breathing on 15 lpm NRM.     1. Acute hypoxic respiratory failure  2. Covid pneumonia  3. Transaminitis  4. Prediabetes  5. Abnormal TSH    =================== Neuro============================  Alert and oriented x 3 at baseline     intubated and sedated 3/29      ================= Cardiovascular==========================  Hypertension  resolved  - Initially, noted to have multiple elevated BP readings  -Continue to monitor, may consider low dose acei/arb if hypertensive     TACHYCARDIA:  pT HR in 80s   will keep monitoring         ================- Pulm=================================  Acute hypoxic respiratory failure: secondary to covid pneumonia  -was on  HFNr then got intubated 3/29  -D-dimer initially elevated on presentation, now wnl  -Remdesivir was discontinued due to positive antibodies   - add albuterol prn   -procalcitonin wnl, d-dimer in 200s  -started pulse steroids for 3 days - 250mg solumedrol bid- completed3/27  -started solumedrol 40 bid     #Pneumothorax and pneumomediastinum:  xray chest : tiny left apical pneumothorax  thoracic surgery consulted recommended no intervention at this time, just observation for now  xray monitoring daily    ==================ID===================================  Covid pneumonia  - leukocytosis 2/2 steroids  -remains afebrile  -plan as above     ================= Nephro================================  -No issues   -monitor lytes, SCr   -monitor I/Os    =================GI====================================  Transaminitis:   likely secondary to covid   - and  on presentation  -Improving-  -continue to monitor,  -  hepatitis panel -ve    ================ Heme==================================  Elevated d-dimer: likely secondary to covid  -d-dimer 423 on presentation, now wnl  -continue prophylactic Lovenox 40 mg QD    =================Endocrine===============================  Prediabetes:    -a1c  5.8  -BS controlled  -continue HSS  -monitor FS while on steroids    Abnormal TSH:  -TSH level noted 0.26,   -TSH 0.37 and   - Free T4 1.82      ================= Skin/Catheters============================  No rashes. Peripheral IV lines.   central line present 3/29    - =================Prophylaxis =============================  Lovenox for DVT proph  Protonix for  GI proph    ==================GOC==================================   FULL CODE ASSESSMENT AND PLAN:  55M, no PMH, PSH appy and moody 1990s presented 3/14 with x9 days worsening cough, subjective fevers, and SOB, with x2-3 days dysuria and central, non-radiating, constant CP. Admitted to [facility] for acute hypoxic respiratory failure s/t covid pneumonia, ICU consulted for increasing work of breathing on 15 lpm NRM.     1. Acute hypoxic respiratory failure  2. Covid pneumonia  3. Transaminitis  4. Prediabetes  5. Abnormal TSH    =================== Neuro============================  Alert and oriented x 3 at baseline     intubated and sedated 3/29      ================= Cardiovascular==========================  Hypertension  resolved  - Initially, noted to have multiple elevated BP readings  -Continue to monitor, may consider low dose acei/arb if hypertensive     TACHYCARDIA:  pT HR in 80s   will keep monitoring         ================- Pulm=================================  Acute hypoxic respiratory failure: secondary to covid pneumonia  -was on  HFNr then got intubated 3/29  -D-dimer initially elevated on presentation, now wnl  -Remdesivir was discontinued due to positive antibodies   - add albuterol prn   -procalcitonin wnl, d-dimer in 200s  -started pulse steroids for 3 days - 250mg solumedrol bid- completed3/27  - solumedrol 40 bid now titrated down to once daily    #Pneumothorax and pneumomediastinum:  xray chest : tiny left apical pneumothorax  thoracic surgery consulted recommended no intervention at this time, just observation for now  xray monitoring daily    ==================ID===================================  Covid pneumonia  - leukocytosis 2/2 steroids  -remains afebrile  -plan as above     ================= Nephro================================  -No issues   -monitor lytes, SCr   -monitor I/Os    =================GI====================================  Transaminitis:   likely secondary to covid   - and  on presentation  -Improving-  -continue to monitor,  -  hepatitis panel -ve    diet:   ng tube and tube feed    ================ Heme==================================  Elevated d-dimer: likely secondary to covid  -d-dimer 423 on presentation, now wnl  -continue prophylactic Lovenox 40 mg QD    =================Endocrine===============================  Prediabetes:    -a1c  5.8  -BS controlled  -continue HSS  -monitor FS while on steroids    Abnormal TSH:  -TSH level noted 0.26,   -TSH 0.37 and   - Free T4 1.82      ================= Skin/Catheters============================  No rashes. Peripheral IV lines.   central line, a-line, ng tube present 3/29,     - =================Prophylaxis =============================  Lovenox for DVT proph  Protonix for  GI proph    ==================GOC==================================   FULL CODE

## 2021-03-31 LAB
ALBUMIN SERPL ELPH-MCNC: 1.8 G/DL — LOW (ref 3.5–5)
ALP SERPL-CCNC: 70 U/L — SIGNIFICANT CHANGE UP (ref 40–120)
ALT FLD-CCNC: 114 U/L DA — HIGH (ref 10–60)
ANION GAP SERPL CALC-SCNC: 5 MMOL/L — SIGNIFICANT CHANGE UP (ref 5–17)
AST SERPL-CCNC: 42 U/L — HIGH (ref 10–40)
BASE EXCESS BLDA CALC-SCNC: 0.9 MMOL/L — SIGNIFICANT CHANGE UP (ref -2–2)
BASOPHILS # BLD AUTO: 0 K/UL — SIGNIFICANT CHANGE UP (ref 0–0.2)
BASOPHILS NFR BLD AUTO: 0 % — SIGNIFICANT CHANGE UP (ref 0–2)
BILIRUB SERPL-MCNC: 0.4 MG/DL — SIGNIFICANT CHANGE UP (ref 0.2–1.2)
BLOOD GAS COMMENTS ARTERIAL: SIGNIFICANT CHANGE UP
BUN SERPL-MCNC: 36 MG/DL — HIGH (ref 7–18)
CALCIUM SERPL-MCNC: 7.9 MG/DL — LOW (ref 8.4–10.5)
CHLORIDE SERPL-SCNC: 109 MMOL/L — HIGH (ref 96–108)
CO2 SERPL-SCNC: 29 MMOL/L — SIGNIFICANT CHANGE UP (ref 22–31)
CREAT SERPL-MCNC: 0.7 MG/DL — SIGNIFICANT CHANGE UP (ref 0.5–1.3)
CRP SERPL-MCNC: 46 MG/L — HIGH
D DIMER BLD IA.RAPID-MCNC: 386 NG/ML DDU — HIGH
DACRYOCYTES BLD QL SMEAR: SLIGHT — SIGNIFICANT CHANGE UP
EOSINOPHIL # BLD AUTO: 0 K/UL — SIGNIFICANT CHANGE UP (ref 0–0.5)
EOSINOPHIL NFR BLD AUTO: 0 % — SIGNIFICANT CHANGE UP (ref 0–6)
FERRITIN SERPL-MCNC: 1396 NG/ML — HIGH (ref 30–400)
FERRITIN SERPL-MCNC: 2866 NG/ML — HIGH (ref 30–400)
GLUCOSE BLDC GLUCOMTR-MCNC: 119 MG/DL — HIGH (ref 70–99)
GLUCOSE BLDC GLUCOMTR-MCNC: 135 MG/DL — HIGH (ref 70–99)
GLUCOSE BLDC GLUCOMTR-MCNC: 166 MG/DL — HIGH (ref 70–99)
GLUCOSE SERPL-MCNC: 109 MG/DL — HIGH (ref 70–99)
HCO3 BLDA-SCNC: 25 MMOL/L — SIGNIFICANT CHANGE UP (ref 23–27)
HCT VFR BLD CALC: 35.5 % — LOW (ref 39–50)
HCT VFR BLD CALC: 36.1 % — LOW (ref 39–50)
HGB BLD-MCNC: 11.5 G/DL — LOW (ref 13–17)
HGB BLD-MCNC: 11.9 G/DL — LOW (ref 13–17)
HOROWITZ INDEX BLDA+IHG-RTO: 50 — SIGNIFICANT CHANGE UP
HYPOCHROMIA BLD QL: SLIGHT — SIGNIFICANT CHANGE UP
LYMPHOCYTES # BLD AUTO: 0.78 K/UL — LOW (ref 1–3.3)
LYMPHOCYTES # BLD AUTO: 6 % — LOW (ref 13–44)
MAGNESIUM SERPL-MCNC: 2.6 MG/DL — SIGNIFICANT CHANGE UP (ref 1.6–2.6)
MANUAL SMEAR VERIFICATION: SIGNIFICANT CHANGE UP
MCHC RBC-ENTMCNC: 31.7 PG — SIGNIFICANT CHANGE UP (ref 27–34)
MCHC RBC-ENTMCNC: 32.2 PG — SIGNIFICANT CHANGE UP (ref 27–34)
MCHC RBC-ENTMCNC: 32.4 GM/DL — SIGNIFICANT CHANGE UP (ref 32–36)
MCHC RBC-ENTMCNC: 33 GM/DL — SIGNIFICANT CHANGE UP (ref 32–36)
MCV RBC AUTO: 97.6 FL — SIGNIFICANT CHANGE UP (ref 80–100)
MCV RBC AUTO: 97.8 FL — SIGNIFICANT CHANGE UP (ref 80–100)
MONOCYTES # BLD AUTO: 0.26 K/UL — SIGNIFICANT CHANGE UP (ref 0–0.9)
MONOCYTES NFR BLD AUTO: 2 % — SIGNIFICANT CHANGE UP (ref 2–14)
NEUTROPHILS # BLD AUTO: 11.8 K/UL — HIGH (ref 1.8–7.4)
NEUTROPHILS NFR BLD AUTO: 91 % — HIGH (ref 43–77)
NRBC # BLD: 0 /100 WBCS — SIGNIFICANT CHANGE UP (ref 0–0)
NRBC # BLD: 0 /100 — SIGNIFICANT CHANGE UP (ref 0–0)
PCO2 BLDA: 40 MMHG — SIGNIFICANT CHANGE UP (ref 32–46)
PH BLDA: 7.42 — SIGNIFICANT CHANGE UP (ref 7.35–7.45)
PHOSPHATE SERPL-MCNC: 2.4 MG/DL — LOW (ref 2.5–4.5)
PHOSPHATE SERPL-MCNC: 4 MG/DL — SIGNIFICANT CHANGE UP (ref 2.5–4.5)
PLAT MORPH BLD: ABNORMAL
PLATELET # BLD AUTO: 201 K/UL — SIGNIFICANT CHANGE UP (ref 150–400)
PLATELET # BLD AUTO: 206 K/UL — SIGNIFICANT CHANGE UP (ref 150–400)
PLATELET CLUMP BLD QL SMEAR: SLIGHT
PO2 BLDA: 99 MMHG — SIGNIFICANT CHANGE UP (ref 74–108)
POIKILOCYTOSIS BLD QL AUTO: SLIGHT — SIGNIFICANT CHANGE UP
POTASSIUM SERPL-MCNC: 4.5 MMOL/L — SIGNIFICANT CHANGE UP (ref 3.5–5.3)
POTASSIUM SERPL-SCNC: 4.5 MMOL/L — SIGNIFICANT CHANGE UP (ref 3.5–5.3)
PROCALCITONIN SERPL-MCNC: 0.16 NG/ML — HIGH (ref 0.02–0.1)
PROT SERPL-MCNC: 5.2 G/DL — LOW (ref 6–8.3)
RAPID RVP RESULT: DETECTED
RBC # BLD: 3.63 M/UL — LOW (ref 4.2–5.8)
RBC # BLD: 3.7 M/UL — LOW (ref 4.2–5.8)
RBC # FLD: 12.5 % — SIGNIFICANT CHANGE UP (ref 10.3–14.5)
RBC # FLD: 12.7 % — SIGNIFICANT CHANGE UP (ref 10.3–14.5)
RBC BLD AUTO: ABNORMAL
SAO2 % BLDA: 97 % — HIGH (ref 92–96)
SARS-COV-2 RNA SPEC QL NAA+PROBE: DETECTED
SODIUM SERPL-SCNC: 143 MMOL/L — SIGNIFICANT CHANGE UP (ref 135–145)
VARIANT LYMPHS # BLD: 1 % — SIGNIFICANT CHANGE UP (ref 0–6)
WBC # BLD: 12.19 K/UL — HIGH (ref 3.8–10.5)
WBC # BLD: 12.97 K/UL — HIGH (ref 3.8–10.5)
WBC # FLD AUTO: 12.19 K/UL — HIGH (ref 3.8–10.5)
WBC # FLD AUTO: 12.97 K/UL — HIGH (ref 3.8–10.5)

## 2021-03-31 PROCEDURE — 99232 SBSQ HOSP IP/OBS MODERATE 35: CPT

## 2021-03-31 PROCEDURE — 71045 X-RAY EXAM CHEST 1 VIEW: CPT | Mod: 26

## 2021-03-31 RX ORDER — CHLORHEXIDINE GLUCONATE 213 G/1000ML
15 SOLUTION TOPICAL
Refills: 0 | Status: DISCONTINUED | OUTPATIENT
Start: 2021-03-31 | End: 2021-04-08

## 2021-03-31 RX ADMIN — ALBUTEROL 2 PUFF(S): 90 AEROSOL, METERED ORAL at 04:08

## 2021-03-31 RX ADMIN — ALBUTEROL 2 PUFF(S): 90 AEROSOL, METERED ORAL at 09:39

## 2021-03-31 RX ADMIN — CHLORHEXIDINE GLUCONATE 1 APPLICATION(S): 213 SOLUTION TOPICAL at 05:04

## 2021-03-31 RX ADMIN — FENTANYL CITRATE 25.2 MICROGRAM(S)/KG/HR: 50 INJECTION INTRAVENOUS at 19:41

## 2021-03-31 RX ADMIN — ENOXAPARIN SODIUM 40 MILLIGRAM(S): 100 INJECTION SUBCUTANEOUS at 11:20

## 2021-03-31 RX ADMIN — Medication 81 MILLIGRAM(S): at 11:20

## 2021-03-31 RX ADMIN — PANTOPRAZOLE SODIUM 40 MILLIGRAM(S): 20 TABLET, DELAYED RELEASE ORAL at 11:21

## 2021-03-31 RX ADMIN — PROPOFOL 15.1 MICROGRAM(S)/KG/MIN: 10 INJECTION, EMULSION INTRAVENOUS at 12:34

## 2021-03-31 RX ADMIN — Medication 1000 MILLIGRAM(S): at 11:20

## 2021-03-31 RX ADMIN — PROPOFOL 15.1 MICROGRAM(S)/KG/MIN: 10 INJECTION, EMULSION INTRAVENOUS at 03:46

## 2021-03-31 RX ADMIN — CHLORHEXIDINE GLUCONATE 15 MILLILITER(S): 213 SOLUTION TOPICAL at 23:19

## 2021-03-31 RX ADMIN — FENTANYL CITRATE 25.2 MICROGRAM(S)/KG/HR: 50 INJECTION INTRAVENOUS at 08:58

## 2021-03-31 RX ADMIN — PROPOFOL 15.1 MICROGRAM(S)/KG/MIN: 10 INJECTION, EMULSION INTRAVENOUS at 21:01

## 2021-03-31 RX ADMIN — Medication 85 MILLIMOLE(S): at 15:20

## 2021-03-31 RX ADMIN — FENTANYL CITRATE 25.2 MICROGRAM(S)/KG/HR: 50 INJECTION INTRAVENOUS at 03:46

## 2021-03-31 RX ADMIN — ALBUTEROL 2 PUFF(S): 90 AEROSOL, METERED ORAL at 16:18

## 2021-03-31 RX ADMIN — Medication 1: at 17:00

## 2021-03-31 RX ADMIN — Medication 40 MILLIGRAM(S): at 05:04

## 2021-03-31 RX ADMIN — PROPOFOL 15.1 MICROGRAM(S)/KG/MIN: 10 INJECTION, EMULSION INTRAVENOUS at 08:58

## 2021-03-31 RX ADMIN — ALBUTEROL 2 PUFF(S): 90 AEROSOL, METERED ORAL at 20:38

## 2021-03-31 NOTE — PROGRESS NOTE ADULT - SUBJECTIVE AND OBJECTIVE BOX
INTERVAL HPI/OVERNIGHT EVENTS:  Patient seen and examined at bedside.   Remains intubated/sedated.    MEDICATIONS  (STANDING):  ALBUTerol    90 MICROgram(s) HFA Inhaler 2 Puff(s) Inhalation every 6 hours  ascorbic acid 1000 milliGRAM(s) Oral daily  aspirin  chewable 81 milliGRAM(s) Oral daily  chlorhexidine 2% Cloths 1 Application(s) Topical <User Schedule>  enoxaparin Injectable 40 milliGRAM(s) SubCutaneous daily  fentaNYL   Infusion. 3 MICROgram(s)/kG/Hr (25.2 mL/Hr) IV Continuous <Continuous>  insulin lispro (ADMELOG) corrective regimen sliding scale   SubCutaneous every 6 hours  methylPREDNISolone sodium succinate Injectable 40 milliGRAM(s) IV Push daily  pantoprazole   Suspension 40 milliGRAM(s) Oral daily  propofol Infusion 30.007 MICROgram(s)/kG/Min (15.1 mL/Hr) IV Continuous <Continuous>  sodium chloride 0.9%. 1000 milliLiter(s) (90 mL/Hr) IV Continuous <Continuous>    MEDICATIONS  (PRN):  acetaminophen   Tablet .. 650 milliGRAM(s) Oral every 6 hours PRN Temp greater or equal to 38C (100.4F)  sodium chloride 0.9% lock flush 10 milliLiter(s) IV Push every 1 hour PRN Pre/post blood products, medications, blood draw, and to maintain line patency      Vital Signs Last 24 Hrs  T(C): 37.3 (31 Mar 2021 04:00), Max: 37.4 (30 Mar 2021 17:59)  T(F): 99.1 (31 Mar 2021 04:00), Max: 99.3 (30 Mar 2021 17:59)  HR: 98 (31 Mar 2021 08:00) (69 - 114)  BP: 124/62 (31 Mar 2021 08:00) (89/50 - 124/62)  BP(mean): 77 (31 Mar 2021 08:00) (57 - 77)  RR: 28 (31 Mar 2021 08:00) (24 - 33)  SpO2: 87% (31 Mar 2021 08:00) (73% - 99%)    Physical:  General: Sedated  Respirations: Intubated    I&O's Detail    30 Mar 2021 07:01  -  31 Mar 2021 07:00  --------------------------------------------------------  IN:    FentaNYL: 78 mL    FentaNYL: 545.2 mL    Jevity 1.5: 140 mL    Propofol: 54 mL    Propofol: 375.6 mL  Total IN: 1192.8 mL    OUT:    Indwelling Catheter - Urethral (mL): 1350 mL  Total OUT: 1350 mL    Total NET: -157.2 mL      31 Mar 2021 07:01  -  31 Mar 2021 08:52  --------------------------------------------------------  IN:    FentaNYL: 26 mL    Propofol: 20 mL  Total IN: 46 mL    OUT:    Indwelling Catheter - Urethral (mL): 50 mL  Total OUT: 50 mL    Total NET: -4 mL          LABS:                        11.9   12.19 )-----------( 206      ( 31 Mar 2021 04:14 )             36.1             03-31    143  |  109<H>  |  36<H>  ----------------------------<  109<H>  4.5   |  29  |  0.70    Ca    7.9<L>      31 Mar 2021 04:14  Phos  2.4     03-31  Mg     2.6     03-31    TPro  5.2<L>  /  Alb  1.8<L>  /  TBili  0.4  /  DBili  x   /  AST  42<H>  /  ALT  114<H>  /  AlkPhos  70  03-31    < from: Xray Chest 1 View- PORTABLE-Urgent (Xray Chest 1 View- PORTABLE-Urgent .) (03.31.21 @ 06:38) >  EXAM:  XR CHEST PORTABLE URGENT 1V                            PROCEDURE DATE:  03/31/2021          INTERPRETATION:  CLINICAL INDICATION: 55 years  Male with ETT.    COMPARISON: 3/30/2021    The right jugular catheter tip overlies the superior vena cava. The ET tube tip overlies the mid trachea. The tip of the feeding tube is below the diaphragm.    AP view of the chest demonstrates a persistent retrocardiac infiltrate with air bronchograms.. There is no pleural effusion. There is no pneumothorax. Bilateral neck subcutaneous emphysema has improved.    The heart is normal in size. There is no mediastinal or hilar mass.    The pulmonary vasculature is normal.    Mild thoracic degenerative changes are present.    IMPRESSION:    Persistent retrocardiac infiltrate.    Tubes and catheters in satisfactory position.    Improvement in subcutaneous emphysema.            NAYANA OBANDO MD; Attending Radiologist  This document has been electronically signed. Mar 31 2021  8:44AM    < end of copied text >

## 2021-03-31 NOTE — PROGRESS NOTE ADULT - ATTENDING COMMENTS
55 yr old  man , non smoker with  moody 1990s presented 3/14 with x9 days worsening cough, subjective fevers, and SOB, with x2-3 days dysuria and central, non-radiating, constant CP. Admitted to medicine unit  for acute hypoxic respiratory failure secondary to pna from covid-19 infection .     Assessment:  1. Acute hypoxic respiratory failure  2 Covid-19 infection   3. Transaminitis  4. Prediabetes  5. Bilateral pneumothorax      Plan   -pat intubated AM 3/29   -small apical left PTX with subcutaneous emphysema   -lower PEEP   -PTX  improved   -adjust vent as per ABG  -Repeated CXR  is worst and monitor respiratory status  -isolation : contact and air borne   -monitor biomarkers daily, trending down   -trial of semi- pulse steroid .. solumedrol 250 bid x 3/3 days   -dvt/gi prophy  -not a candidate for remdesivir due to covid-19 antibodies and elevated LFT  -not a candidate for Tociluzimab due to elevated LFT  -hemodynamic monitoring   -NGT .

## 2021-03-31 NOTE — CHART NOTE - NSCHARTNOTEFT_GEN_A_CORE
dianna   son in [facility]  [phone] name of son is Mike bustamante.   LIVES with pt brother.

## 2021-03-31 NOTE — PROGRESS NOTE ADULT - SUBJECTIVE AND OBJECTIVE BOX
INTERVAL HPI/OVERNIGHT EVENTS: ***    PRESSORS: [ ] YES [ ] NO  WHICH:    ANTIBIOTICS:                      Antimicrobial:    Cardiovascular:    Pulmonary:  ALBUTerol    90 MICROgram(s) HFA Inhaler 2 Puff(s) Inhalation every 6 hours    Hematalogic:  aspirin  chewable 81 milliGRAM(s) Oral daily  enoxaparin Injectable 40 milliGRAM(s) SubCutaneous daily    Other:  acetaminophen   Tablet .. 650 milliGRAM(s) Oral every 6 hours PRN  ascorbic acid 1000 milliGRAM(s) Oral daily  chlorhexidine 2% Cloths 1 Application(s) Topical <User Schedule>  fentaNYL   Infusion. 3 MICROgram(s)/kG/Hr IV Continuous <Continuous>  insulin lispro (ADMELOG) corrective regimen sliding scale   SubCutaneous every 6 hours  methylPREDNISolone sodium succinate Injectable 40 milliGRAM(s) IV Push daily  pantoprazole   Suspension 40 milliGRAM(s) Oral daily  propofol Infusion 30.007 MICROgram(s)/kG/Min IV Continuous <Continuous>  sodium chloride 0.9% lock flush 10 milliLiter(s) IV Push every 1 hour PRN  sodium chloride 0.9%. 1000 milliLiter(s) IV Continuous <Continuous>    acetaminophen   Tablet .. 650 milliGRAM(s) Oral every 6 hours PRN  ALBUTerol    90 MICROgram(s) HFA Inhaler 2 Puff(s) Inhalation every 6 hours  ascorbic acid 1000 milliGRAM(s) Oral daily  aspirin  chewable 81 milliGRAM(s) Oral daily  chlorhexidine 2% Cloths 1 Application(s) Topical <User Schedule>  enoxaparin Injectable 40 milliGRAM(s) SubCutaneous daily  fentaNYL   Infusion. 3 MICROgram(s)/kG/Hr IV Continuous <Continuous>  insulin lispro (ADMELOG) corrective regimen sliding scale   SubCutaneous every 6 hours  methylPREDNISolone sodium succinate Injectable 40 milliGRAM(s) IV Push daily  pantoprazole   Suspension 40 milliGRAM(s) Oral daily  propofol Infusion 30.007 MICROgram(s)/kG/Min IV Continuous <Continuous>  sodium chloride 0.9% lock flush 10 milliLiter(s) IV Push every 1 hour PRN  sodium chloride 0.9%. 1000 milliLiter(s) IV Continuous <Continuous>    Drug Dosing Weight  Height (cm): 167.6 (14 Mar 2021 12:03)  Weight (kg): 83.869 (14 Mar 2021 12:03)  BMI (kg/m2): 29.9 (14 Mar 2021 12:03)  BSA (m2): 1.93 (14 Mar 2021 12:03)    CENTRAL LINE: [ ] YES [ ] NO  LOCATION:   DATE INSERTED:  REMOVE: [ ] YES [ ] NO  EXPLAIN:    RIVERA: [ ] YES [ ] NO    DATE INSERTED:  REMOVE:  [ ] YES [ ] NO  EXPLAIN:    A-LINE:  [ ] YES [ ] NO  LOCATION:   DATE INSERTED:  REMOVE:  [ ] YES [ ] NO  EXPLAIN:    PMH -reviewed admission note, no change since admission    ICU Vital Signs Last 24 Hrs  T(C): 37.3 (31 Mar 2021 04:00), Max: 37.4 (30 Mar 2021 17:59)  T(F): 99.1 (31 Mar 2021 04:00), Max: 99.3 (30 Mar 2021 17:59)  HR: 82 (31 Mar 2021 09:31) (69 - 114)  BP: 124/62 (31 Mar 2021 08:00) (89/50 - 124/62)  BP(mean): 77 (31 Mar 2021 08:00) (57 - 77)  ABP: 120/59 (31 Mar 2021 09:00) (96/46 - 172/76)  ABP(mean): 77 (31 Mar 2021 09:00) (60 - 108)  RR: 24 (31 Mar 2021 09:00) (24 - 33)  SpO2: 96% (31 Mar 2021 09:31) (73% - 99%)      ABG - ( 31 Mar 2021 04:29 )  pH, Arterial: 7.42  pH, Blood: x     /  pCO2: 40    /  pO2: 99    / HCO3: 25    / Base Excess: 0.9   /  SaO2: 97                    03-30 @ 07:01  -  03-31 @ 07:00  --------------------------------------------------------  IN: 1192.8 mL / OUT: 1350 mL / NET: -157.2 mL        Mode: AC/ CMV (Assist Control/ Continuous Mandatory Ventilation)  RR (machine): 24  TV (machine): 500  FiO2: 80  PEEP: 8  ITime: 0.8  MAP: 15  PIP: 35      PHYSICAL EXAM:    GENERAL: NAD, well-groomed, well-developed  HEAD:  Atraumatic, Normocephalic  EYES: EOMI, PERRLA, conjunctiva and sclera clear  ENMT: No tonsillar erythema, exudates, or enlargement; Moist mucous membranes, Good dentition, No lesions  NECK: Supple, normal appearance, No JVD; Normal thyroid; Trachea midline  NERVOUS SYSTEM:  Alert & Oriented X3, Good concentration; Motor Strength 5/5 B/L upper and lower extremities; DTRs 2+ intact and symmetric  CHEST/LUNG: No chest deformity; Normal percussion bilaterally; No rales, rhonchi, wheezing   HEART: Regular rate and rhythm; No murmurs, rubs, or gallops  ABDOMEN: Soft, Nontender, Nondistended; Bowel sounds present  EXTREMITIES:  2+ Peripheral Pulses, No clubbing, cyanosis, or edema  LYMPH: No lymphadenopathy noted  SKIN: No rashes or lesions; Good capillary refill      LABS:  CBC Full  -  ( 31 Mar 2021 04:14 )  WBC Count : 12.19 K/uL  RBC Count : 3.70 M/uL  Hemoglobin : 11.9 g/dL  Hematocrit : 36.1 %  Platelet Count - Automated : 206 K/uL  Mean Cell Volume : 97.6 fl  Mean Cell Hemoglobin : 32.2 pg  Mean Cell Hemoglobin Concentration : 33.0 gm/dL  Auto Neutrophil # : x  Auto Lymphocyte # : x  Auto Monocyte # : x  Auto Eosinophil # : x  Auto Basophil # : x  Auto Neutrophil % : x  Auto Lymphocyte % : x  Auto Monocyte % : x  Auto Eosinophil % : x  Auto Basophil % : x    03-31    143  |  109<H>  |  36<H>  ----------------------------<  109<H>  4.5   |  29  |  0.70    Ca    7.9<L>      31 Mar 2021 04:14  Phos  2.4     03-31  Mg     2.6     03-31    TPro  5.2<L>  /  Alb  1.8<L>  /  TBili  0.4  /  DBili  x   /  AST  42<H>  /  ALT  114<H>  /  AlkPhos  70  03-31    PT/INR - ( 29 Mar 2021 12:54 )   PT: 13.5 sec;   INR: 1.14 ratio         PTT - ( 29 Mar 2021 12:54 )  PTT:26.2 sec        RADIOLOGY & ADDITIONAL STUDIES REVIEWED:  ***    GOALS OF CARE DISCUSSION WITH PATIENT/FAMILY/PROXY:    CRITICAL CARE TIME SPENT: 35 minutes INTERVAL HPI/OVERNIGHT EVENTS: Pt was desaturaing in am to 80s but afterwards got better.         Pulmonary:  ALBUTerol    90 MICROgram(s) HFA Inhaler 2 Puff(s) Inhalation every 6 hours    Hematalogic:  aspirin  chewable 81 milliGRAM(s) Oral daily  enoxaparin Injectable 40 milliGRAM(s) SubCutaneous daily    Other:  acetaminophen   Tablet .. 650 milliGRAM(s) Oral every 6 hours PRN  ascorbic acid 1000 milliGRAM(s) Oral daily  chlorhexidine 2% Cloths 1 Application(s) Topical <User Schedule>  fentaNYL   Infusion. 3 MICROgram(s)/kG/Hr IV Continuous <Continuous>  insulin lispro (ADMELOG) corrective regimen sliding scale   SubCutaneous every 6 hours  methylPREDNISolone sodium succinate Injectable 40 milliGRAM(s) IV Push daily  pantoprazole   Suspension 40 milliGRAM(s) Oral daily  propofol Infusion 30.007 MICROgram(s)/kG/Min IV Continuous <Continuous>  sodium chloride 0.9% lock flush 10 milliLiter(s) IV Push every 1 hour PRN  sodium chloride 0.9%. 1000 milliLiter(s) IV Continuous <Continuous>    acetaminophen   Tablet .. 650 milliGRAM(s) Oral every 6 hours PRN  ALBUTerol    90 MICROgram(s) HFA Inhaler 2 Puff(s) Inhalation every 6 hours  ascorbic acid 1000 milliGRAM(s) Oral daily  aspirin  chewable 81 milliGRAM(s) Oral daily  chlorhexidine 2% Cloths 1 Application(s) Topical <User Schedule>  enoxaparin Injectable 40 milliGRAM(s) SubCutaneous daily  fentaNYL   Infusion. 3 MICROgram(s)/kG/Hr IV Continuous <Continuous>  insulin lispro (ADMELOG) corrective regimen sliding scale   SubCutaneous every 6 hours  methylPREDNISolone sodium succinate Injectable 40 milliGRAM(s) IV Push daily  pantoprazole   Suspension 40 milliGRAM(s) Oral daily  propofol Infusion 30.007 MICROgram(s)/kG/Min IV Continuous <Continuous>  sodium chloride 0.9% lock flush 10 milliLiter(s) IV Push every 1 hour PRN  sodium chloride 0.9%. 1000 milliLiter(s) IV Continuous <Continuous>    Drug Dosing Weight  Height (cm): 167.6 (14 Mar 2021 12:03)  Weight (kg): 83.869 (14 Mar 2021 12:03)  BMI (kg/m2): 29.9 (14 Mar 2021 12:03)  BSA (m2): 1.93 (14 Mar 2021 12:03)    CENTRAL LINE: [x ] YES [ ] NO  LOCATION: [facility]   DATE INSERTED: 3/29  REMOVE: [ ] YES [ ] NO  EXPLAIN:    RIVERA: [x ] YES [ ] NO    DATE INSERTED:  REMOVE:  [ ] YES [ ] NO  EXPLAIN:    A-LINE:  [x ] YES [ ] NO  LOCATION: [facility]  DATE INSERTED: 3/29  REMOVE:  [ ] YES [ ] NO  EXPLAIN:    PMH -reviewed admission note, no change since admission    ICU Vital Signs Last 24 Hrs  T(C): 37.3 (31 Mar 2021 04:00), Max: 37.4 (30 Mar 2021 17:59)  T(F): 99.1 (31 Mar 2021 04:00), Max: 99.3 (30 Mar 2021 17:59)  HR: 82 (31 Mar 2021 09:31) (69 - 114)  BP: 124/62 (31 Mar 2021 08:00) (89/50 - 124/62)  BP(mean): 77 (31 Mar 2021 08:00) (57 - 77)  ABP: 120/59 (31 Mar 2021 09:00) (96/46 - 172/76)  ABP(mean): 77 (31 Mar 2021 09:00) (60 - 108)  RR: 24 (31 Mar 2021 09:00) (24 - 33)  SpO2: 96% (31 Mar 2021 09:31) (73% - 99%)      ABG - ( 31 Mar 2021 04:29 )  pH, Arterial: 7.42  pH, Blood: x     /  pCO2: 40    /  pO2: 99    / HCO3: 25    / Base Excess: 0.9   /  SaO2: 97                    03-30 @ 07:01  -  03-31 @ 07:00  --------------------------------------------------------  IN: 1192.8 mL / OUT: 1350 mL / NET: -157.2 mL        Mode: AC/ CMV (Assist Control/ Continuous Mandatory Ventilation)  RR (machine): 24  TV (machine): 500  FiO2: 80  PEEP: 8  ITime: 0.8  MAP: 15  PIP: 35      PHYSICAL EXAM:    GENERAL: sedated and intuated  HEAD:  Atraumatic, Normocephalic  EYES: EOMI, PERRLA, conjunctiva and sclera clear  ENMT: No tonsillar erythema, exudates, or enlargement; blood at nostrils  NECK: Supple, normal appearance,   NERVOUS SYSTEM: sedated and intubated  CHEST/LUNG: DECREASED BREATH SOUNDSon left tube   HEART: s1,s2  No murmurs, rubs, or gallops  ABDOMEN: Soft, Nontender, Nondistended;  EXTREMITIES:  2+ Peripheral Pulses, No clubbing, cyanosis, or edema  LYMPH: No lymphadenopathy noted  SKIN: No rashes or lesions; Good capillary refill      LABS:  CBC Full  -  ( 31 Mar 2021 04:14 )  WBC Count : 12.19 K/uL  RBC Count : 3.70 M/uL  Hemoglobin : 11.9 g/dL  Hematocrit : 36.1 %  Platelet Count - Automated : 206 K/uL  Mean Cell Volume : 97.6 fl  Mean Cell Hemoglobin : 32.2 pg  Mean Cell Hemoglobin Concentration : 33.0 gm/dL  Auto Neutrophil # : x  Auto Lymphocyte # : x  Auto Monocyte # : x  Auto Eosinophil # : x  Auto Basophil # : x  Auto Neutrophil % : x  Auto Lymphocyte % : x  Auto Monocyte % : x  Auto Eosinophil % : x  Auto Basophil % : x    03-31    143  |  109<H>  |  36<H>  ----------------------------<  109<H>  4.5   |  29  |  0.70    Ca    7.9<L>      31 Mar 2021 04:14  Phos  2.4     03-31  Mg     2.6     03-31    TPro  5.2<L>  /  Alb  1.8<L>  /  TBili  0.4  /  DBili  x   /  AST  42<H>  /  ALT  114<H>  /  AlkPhos  70  03-31    PT/INR - ( 29 Mar 2021 12:54 )   PT: 13.5 sec;   INR: 1.14 ratio         PTT - ( 29 Mar 2021 12:54 )  PTT:26.2 sec        CRITICAL CARE TIME SPENT: 35 minutes INTERVAL HPI/OVERNIGHT EVENTS: Pt was desaturaing in am to 80s but afterwards got better.         Pulmonary:  ALBUTerol    90 MICROgram(s) HFA Inhaler 2 Puff(s) Inhalation every 6 hours    Hematalogic:  aspirin  chewable 81 milliGRAM(s) Oral daily  enoxaparin Injectable 40 milliGRAM(s) SubCutaneous daily    Other:  acetaminophen   Tablet .. 650 milliGRAM(s) Oral every 6 hours PRN  ascorbic acid 1000 milliGRAM(s) Oral daily  chlorhexidine 2% Cloths 1 Application(s) Topical <User Schedule>  fentaNYL   Infusion. 3 MICROgram(s)/kG/Hr IV Continuous <Continuous>  insulin lispro (ADMELOG) corrective regimen sliding scale   SubCutaneous every 6 hours  methylPREDNISolone sodium succinate Injectable 40 milliGRAM(s) IV Push daily  pantoprazole   Suspension 40 milliGRAM(s) Oral daily  propofol Infusion 30.007 MICROgram(s)/kG/Min IV Continuous <Continuous>  sodium chloride 0.9% lock flush 10 milliLiter(s) IV Push every 1 hour PRN  sodium chloride 0.9%. 1000 milliLiter(s) IV Continuous <Continuous>    acetaminophen   Tablet .. 650 milliGRAM(s) Oral every 6 hours PRN  ALBUTerol    90 MICROgram(s) HFA Inhaler 2 Puff(s) Inhalation every 6 hours  ascorbic acid 1000 milliGRAM(s) Oral daily  aspirin  chewable 81 milliGRAM(s) Oral daily  chlorhexidine 2% Cloths 1 Application(s) Topical <User Schedule>  enoxaparin Injectable 40 milliGRAM(s) SubCutaneous daily  fentaNYL   Infusion. 3 MICROgram(s)/kG/Hr IV Continuous <Continuous>  insulin lispro (ADMELOG) corrective regimen sliding scale   SubCutaneous every 6 hours  methylPREDNISolone sodium succinate Injectable 40 milliGRAM(s) IV Push daily  pantoprazole   Suspension 40 milliGRAM(s) Oral daily  propofol Infusion 30.007 MICROgram(s)/kG/Min IV Continuous <Continuous>  sodium chloride 0.9% lock flush 10 milliLiter(s) IV Push every 1 hour PRN  sodium chloride 0.9%. 1000 milliLiter(s) IV Continuous <Continuous>    Drug Dosing Weight  Height (cm): 167.6 (14 Mar 2021 12:03)  Weight (kg): 83.869 (14 Mar 2021 12:03)  BMI (kg/m2): 29.9 (14 Mar 2021 12:03)  BSA (m2): 1.93 (14 Mar 2021 12:03)    CENTRAL LINE: [x ] YES [ ] NO  LOCATION: [facility]   DATE INSERTED: 3/29  REMOVE: [ ] YES [ ] NO  EXPLAIN:    RIVERA: [x ] YES [ ] NO    DATE INSERTED:  REMOVE:  [ ] YES [ ] NO  EXPLAIN:    A-LINE:  [x ] YES [ ] NO  LOCATION: [facility]  DATE INSERTED: 3/29  REMOVE:  [ ] YES [ ] NO  EXPLAIN:    PMH -reviewed admission note, no change since admission    ICU Vital Signs Last 24 Hrs  T(C): 37.3 (31 Mar 2021 04:00), Max: 37.4 (30 Mar 2021 17:59)  T(F): 99.1 (31 Mar 2021 04:00), Max: 99.3 (30 Mar 2021 17:59)  HR: 82 (31 Mar 2021 09:31) (69 - 114)  BP: 124/62 (31 Mar 2021 08:00) (89/50 - 124/62)  BP(mean): 77 (31 Mar 2021 08:00) (57 - 77)  ABP: 120/59 (31 Mar 2021 09:00) (96/46 - 172/76)  ABP(mean): 77 (31 Mar 2021 09:00) (60 - 108)  RR: 24 (31 Mar 2021 09:00) (24 - 33)  SpO2: 96% (31 Mar 2021 09:31) (73% - 99%)      ABG - ( 31 Mar 2021 04:29 )  pH, Arterial: 7.42  pH, Blood: x     /  pCO2: 40    /  pO2: 99    / HCO3: 25    / Base Excess: 0.9   /  SaO2: 97                    03-30 @ 07:01  -  03-31 @ 07:00  --------------------------------------------------------  IN: 1192.8 mL / OUT: 1350 mL / NET: -157.2 mL        Mode: AC/ CMV (Assist Control/ Continuous Mandatory Ventilation)  RR (machine): 24  TV (machine): 500  FiO2: 80  PEEP: 8  ITime: 0.8  MAP: 15  PIP: 35      PHYSICAL EXAM:    GENERAL: sedated and intuated  HEAD:  Atraumatic, Normocephalic  EYES: EOMI, PERRLA, conjunctiva and sclera clear  ENMT: No tonsillar erythema, exudates, or enlargement; blood at nostrils  NECK: Supple, normal appearance,   NERVOUS SYSTEM: sedated and intubated  CHEST/LUNG: Pt has breath sounds on both sides   HEART: s1,s2  No murmurs, rubs, or gallops  ABDOMEN: Soft, Nontender, Nondistended;  EXTREMITIES:  2+ Peripheral Pulses, No clubbing, cyanosis, or edema  LYMPH: No lymphadenopathy noted  SKIN: No rashes or lesions; Good capillary refill      LABS:  CBC Full  -  ( 31 Mar 2021 04:14 )  WBC Count : 12.19 K/uL  RBC Count : 3.70 M/uL  Hemoglobin : 11.9 g/dL  Hematocrit : 36.1 %  Platelet Count - Automated : 206 K/uL  Mean Cell Volume : 97.6 fl  Mean Cell Hemoglobin : 32.2 pg  Mean Cell Hemoglobin Concentration : 33.0 gm/dL  Auto Neutrophil # : x  Auto Lymphocyte # : x  Auto Monocyte # : x  Auto Eosinophil # : x  Auto Basophil # : x  Auto Neutrophil % : x  Auto Lymphocyte % : x  Auto Monocyte % : x  Auto Eosinophil % : x  Auto Basophil % : x    03-31    143  |  109<H>  |  36<H>  ----------------------------<  109<H>  4.5   |  29  |  0.70    Ca    7.9<L>      31 Mar 2021 04:14  Phos  2.4     03-31  Mg     2.6     03-31    TPro  5.2<L>  /  Alb  1.8<L>  /  TBili  0.4  /  DBili  x   /  AST  42<H>  /  ALT  114<H>  /  AlkPhos  70  03-31    PT/INR - ( 29 Mar 2021 12:54 )   PT: 13.5 sec;   INR: 1.14 ratio         PTT - ( 29 Mar 2021 12:54 )  PTT:26.2 sec        CRITICAL CARE TIME SPENT: 35 minutes

## 2021-03-31 NOTE — PROGRESS NOTE ADULT - SUBJECTIVE AND OBJECTIVE BOX
`Patient is a 55y old  Male who presents with a chief complaint of SOB (31 Mar 2021 11:04)    pt seen in icu [ x ], reg med floor [   ], bed [ x ], chair at bedside [   ], a+o x3 [  ], sedated [x  ],  nad [x  ]    andrea [ x ], ngt [x  ], et tube [ x ], cent line [x  ],          Allergies    No Known Allergies        Vitals    T(F): 99.1 (03-31-21 @ 04:00), Max: 99.3 (03-30-21 @ 17:59)  HR: 71 (03-31-21 @ 12:28) (69 - 114)  BP: 108/48 (03-31-21 @ 12:00) (89/50 - 124/62)  RR: 24 (03-31-21 @ 12:00) (24 - 33)  SpO2: 96% (03-31-21 @ 12:28) (73% - 99%)  Wt(kg): --  CAPILLARY BLOOD GLUCOSE      POCT Blood Glucose.: 119 mg/dL (31 Mar 2021 11:35)      Labs                          11.9   12.19 )-----------( 206      ( 31 Mar 2021 04:14 )             36.1       03-31    143  |  109<H>  |  36<H>  ----------------------------<  109<H>  4.5   |  29  |  0.70    Ca    7.9<L>      31 Mar 2021 04:14  Phos  2.4     03-31  Mg     2.6     03-31    TPro  5.2<L>  /  Alb  1.8<L>  /  TBili  0.4  /  DBili  x   /  AST  42<H>  /  ALT  114<H>  /  AlkPhos  70  03-31            .Urine Clean Catch (Midstream)  03-15 @ 00:52   No growth  --  --          Radiology Results      Meds    MEDICATIONS  (STANDING):  ALBUTerol    90 MICROgram(s) HFA Inhaler 2 Puff(s) Inhalation every 6 hours  ascorbic acid 1000 milliGRAM(s) Oral daily  aspirin  chewable 81 milliGRAM(s) Oral daily  chlorhexidine 2% Cloths 1 Application(s) Topical <User Schedule>  enoxaparin Injectable 40 milliGRAM(s) SubCutaneous daily  fentaNYL   Infusion. 3 MICROgram(s)/kG/Hr (25.2 mL/Hr) IV Continuous <Continuous>  insulin lispro (ADMELOG) corrective regimen sliding scale   SubCutaneous every 6 hours  methylPREDNISolone sodium succinate Injectable 40 milliGRAM(s) IV Push daily  pantoprazole   Suspension 40 milliGRAM(s) Oral daily  propofol Infusion 30.007 MICROgram(s)/kG/Min (15.1 mL/Hr) IV Continuous <Continuous>  sodium chloride 0.9%. 1000 milliLiter(s) (90 mL/Hr) IV Continuous <Continuous>  sodium phosphate IVPB 30 milliMole(s) IV Intermittent once      MEDICATIONS  (PRN):  acetaminophen   Tablet .. 650 milliGRAM(s) Oral every 6 hours PRN Temp greater or equal to 38C (100.4F)  sodium chloride 0.9% lock flush 10 milliLiter(s) IV Push every 1 hour PRN Pre/post blood products, medications, blood draw, and to maintain line patency      Physical Exam    Neuro :  no focal deficits  Respiratory: CTA B/L  CV: RRR, S1S2, no murmurs,   Abdominal: Soft, NT, ND +BS,  Extremities: No edema, + peripheral pulses      ASSESSMENT    Hypoxemia 2nd to covid pna   transaminitis  prediabetes  h/o appendectomy  cholecystectomy        PLAN    contact and airborne isolation  d/c remdesevir given covid ab positive noted   completed dexamethasone   started pulse steroids for 3 days - 250mg solumedrol bid  cont asa, vit c,    cont albuterol inhaler   pulm f/u  procalcitonin, D-dimer, crp, ldh, ferritin, lactate noted ,    tmx 99.3  cont tylenol prn,   cont robitussin prn   pt on fentanyl and propofol drip  s/p intubation 3/29/21  O2 sat (73% - 99%) mech vent  O2 via mech vent   xray 3/19/21 with pneumomediastinum  rept cxr with New trace right apical pneumothorax. New mild left apical pneumothorax. Grossly stable small pneumomediastinum.  Soft tissue emphysema at the neck bases bilaterally. Grossly stable bilateral pulmonary infiltrates noted.   cxr 2/24 with No evidence of pneumothorax can be appreciated on the available image. This may be related to patient positioning. Evidence of pneumomediastinum and subcutaneous emphysema in the lower neck is again noted. There are patchy bibasilar infiltrates and elevated right hemidiaphragm noted.   cxr 3/29/21 with No significant change bilateral infiltrates. There is a small simple left apical pneumothorax. No significant pleural effusion. Bilateral subcutaneous emphysema similar to prior.   thoracic surg f/u   pt intubated 6AM, XRs on 7AM and 5PM with no obvious increase of ptx  no immediate need for CT at this time,   recommend close monitoring with serial CXRs   lispro ss  cont current meds  cont mgmt as per icu

## 2021-03-31 NOTE — PROGRESS NOTE ADULT - SUBJECTIVE AND OBJECTIVE BOX
Patient is a 55y old  Male who presents with a chief complaint of SOB (31 Mar 2021 08:50)  Patient remains intubated sedated on mechanical ventilation. FIO2 decreased to 80%    INTERVAL HPI/OVERNIGHT EVENTS:      VITAL SIGNS:  T(F): 99.1 (03-31-21 @ 04:00)  HR: 82 (03-31-21 @ 09:31)  BP: 124/62 (03-31-21 @ 08:00)  RR: 24 (03-31-21 @ 09:00)  SpO2: 96% (03-31-21 @ 09:31)  Wt(kg): --  I&O's Detail    30 Mar 2021 07:01  -  31 Mar 2021 07:00  --------------------------------------------------------  IN:    FentaNYL: 78 mL    FentaNYL: 545.2 mL    Jevity 1.5: 140 mL    Propofol: 375.6 mL    Propofol: 54 mL  Total IN: 1192.8 mL    OUT:    Indwelling Catheter - Urethral (mL): 1350 mL  Total OUT: 1350 mL    Total NET: -157.2 mL      31 Mar 2021 07:01  -  31 Mar 2021 09:56  --------------------------------------------------------  IN:    FentaNYL: 26 mL    Propofol: 67 mL  Total IN: 93 mL    OUT:    Indwelling Catheter - Urethral (mL): 140 mL  Total OUT: 140 mL    Total NET: -47 mL        Mode: AC/ CMV (Assist Control/ Continuous Mandatory Ventilation)  RR (machine): 24  TV (machine): 500  FiO2: 80  PEEP: 8  ITime: 0.8  MAP: 15  PIP: 35        REVIEW OF SYSTEMS:    CONSTITUTIONAL:  No fevers, chills, sweats    HEENT:  Eyes:  No diplopia or blurred vision. ENT:  No earache, sore throat or runny nose.    CARDIOVASCULAR:  No pressure, squeezing, tightness, or heaviness about the chest; no palpitations.    RESPIRATORY:  Per HPI    GASTROINTESTINAL:  No abdominal pain, nausea, vomiting or diarrhea.    GENITOURINARY:  No dysuria, frequency or urgency.    NEUROLOGIC:  No paresthesias, fasciculations, seizures or weakness.    PSYCHIATRIC:  No disorder of thought or mood.      PHYSICAL EXAM:    Constitutional: Well developed and nourished  Eyes:Perrla  ENMT: normal  Neck:supple  Respiratory: good air entry  Cardiovascular: S1 S2 regular  Gastrointestinal: Soft, Non tender  Extremities: No edema  Vascular:normal  Neurological:Sedated  Musculoskeletal:Normal      MEDICATIONS  (STANDING):  ALBUTerol    90 MICROgram(s) HFA Inhaler 2 Puff(s) Inhalation every 6 hours  ascorbic acid 1000 milliGRAM(s) Oral daily  aspirin  chewable 81 milliGRAM(s) Oral daily  chlorhexidine 2% Cloths 1 Application(s) Topical <User Schedule>  enoxaparin Injectable 40 milliGRAM(s) SubCutaneous daily  fentaNYL   Infusion. 3 MICROgram(s)/kG/Hr (25.2 mL/Hr) IV Continuous <Continuous>  insulin lispro (ADMELOG) corrective regimen sliding scale   SubCutaneous every 6 hours  methylPREDNISolone sodium succinate Injectable 40 milliGRAM(s) IV Push daily  pantoprazole   Suspension 40 milliGRAM(s) Oral daily  propofol Infusion 30.007 MICROgram(s)/kG/Min (15.1 mL/Hr) IV Continuous <Continuous>  sodium chloride 0.9%. 1000 milliLiter(s) (90 mL/Hr) IV Continuous <Continuous>    MEDICATIONS  (PRN):  acetaminophen   Tablet .. 650 milliGRAM(s) Oral every 6 hours PRN Temp greater or equal to 38C (100.4F)  sodium chloride 0.9% lock flush 10 milliLiter(s) IV Push every 1 hour PRN Pre/post blood products, medications, blood draw, and to maintain line patency      Allergies    No Known Allergies    Intolerances        LABS:                        11.9   12.19 )-----------( 206      ( 31 Mar 2021 04:14 )             36.1     03-31    143  |  109<H>  |  36<H>  ----------------------------<  109<H>  4.5   |  29  |  0.70    Ca    7.9<L>      31 Mar 2021 04:14  Phos  2.4     03-31  Mg     2.6     03-31    TPro  5.2<L>  /  Alb  1.8<L>  /  TBili  0.4  /  DBili  x   /  AST  42<H>  /  ALT  114<H>  /  AlkPhos  70  03-31    PT/INR - ( 29 Mar 2021 12:54 )   PT: 13.5 sec;   INR: 1.14 ratio         PTT - ( 29 Mar 2021 12:54 )  PTT:26.2 sec    ABG - ( 31 Mar 2021 04:29 )  pH, Arterial: 7.42  pH, Blood: x     /  pCO2: 40    /  pO2: 99    / HCO3: 25    / Base Excess: 0.9   /  SaO2: 97                    CAPILLARY BLOOD GLUCOSE      POCT Blood Glucose.: 111 mg/dL (30 Mar 2021 23:28)  POCT Blood Glucose.: 126 mg/dL (30 Mar 2021 17:22)  POCT Blood Glucose.: 124 mg/dL (30 Mar 2021 11:21)    pro-bnp -- 03-31 @ 04:14     d-dimer 386  03-31 @ 04:14  pro-bnp -- 03-28 @ 03:54     d-dimer 224  03-28 @ 03:54  pro-bnp -- 03-25 @ 05:41     d-dimer 242  03-25 @ 05:41      RADIOLOGY & ADDITIONAL TESTS:    CXR:  < from: Xray Chest 1 View- PORTABLE-Urgent (Xray Chest 1 View- PORTABLE-Urgent .) (03.31.21 @ 06:38) >  IMPRESSION:    Persistent retrocardiac infiltrate.    Tubes and catheters in satisfactory position.    < end of copied text >    Ct scan chest:    ekg;    echo:

## 2021-03-31 NOTE — PROGRESS NOTE ADULT - ASSESSMENT
Assessment and Recommendation:   Problem/Recommendation - 1:  Problem: COVID-19. Recommendation: isolation precautions  cont mechanical ventilation  tazper FIO2 as tolerated  Wean as tolerated  pulmonary toilet  NGT nutrition  ICU management.   Monitor oxygen sat  Monitor LFT, LDH, CRP, D-Dimer, Ferritin and procalcitonin  Vit C, D and zinc supp  Montelukast 10 mgs po Qhs  IV steroids.  Daily CXR   DVT and GI PPX     Problem/Recommendation - 2:  ·  Problem: UTI (urinary tract infection).  Recommendation: F.U cultures   Off Antibiotics.     Problem/Recommendation - 3:  ·  Problem: Chest pain.  Recommendation: likely related to Covid-19 infection.     Problem/Recommendation - 4:  ·  Problem: Transaminitis.  Recommendation: monitor LFTs  GI F/U     Problem/Recommendation - 5:  ·  Problem: Pneumothorax.  Recommendation: New trace right apical pneumothorax. New mild left apical pneumothorax.  Thoracic sx eval noted.  Continue to f/u CXR   Management per ICU

## 2021-03-31 NOTE — PROGRESS NOTE ADULT - ASSESSMENT
54 yo M with COVID PNA and L tiny apical pneumothorax. AM CXR reviewed.     -Daily CXRs   -Continue care as per ICU team  -Will discuss with Dr. Melchor

## 2021-03-31 NOTE — PROGRESS NOTE ADULT - ASSESSMENT
ASSESSMENT AND PLAN:  55M, no PMH, PSH appy and moody 1990s presented 3/14 with x9 days worsening cough, subjective fevers, and SOB, with x2-3 days dysuria and central, non-radiating, constant CP. Admitted to [facility] for acute hypoxic respiratory failure s/t covid pneumonia, ICU consulted for increasing work of breathing on 15 lpm NRM.     1. Acute hypoxic respiratory failure  2. Covid pneumonia  3. Transaminitis  4. Prediabetes  5. Abnormal TSH    =================== Neuro============================  Alert and oriented x 3 at baseline     intubated and sedated 3/29      ================= Cardiovascular==========================  Hypertension  resolved  - Initially, noted to have multiple elevated BP readings  -Continue to monitor, may consider low dose acei/arb if hypertensive     TACHYCARDIA:  pT HR in 80s   will keep monitoring         ================- Pulm=================================  Acute hypoxic respiratory failure: secondary to covid pneumonia  -was on  HFNr then got intubated 3/29  -D-dimer initially elevated on presentation, now wnl  -Remdesivir was discontinued due to positive antibodies   - add albuterol prn   -procalcitonin wnl, d-dimer in 200s  -started pulse steroids for 3 days - 250mg solumedrol bid- completed3/27  - solumedrol 40 bid now titrated down to once daily    #Pneumothorax and pneumomediastinum:  xray chest : tiny left apical pneumothorax  thoracic surgery consulted recommended no intervention at this time, just observation for now  xray monitoring daily    ==================ID===================================  Covid pneumonia  - leukocytosis 2/2 steroids  -remains afebrile  -plan as above     ================= Nephro================================  -No issues   -monitor lytes, SCr   -monitor I/Os    =================GI====================================  Transaminitis:   likely secondary to covid   - and  on presentation  -Improving-  -continue to monitor,  -  hepatitis panel -ve    diet:   ng tube and tube feed    ================ Heme==================================  Elevated d-dimer: likely secondary to covid  -d-dimer 423 on presentation, now wnl  -continue prophylactic Lovenox 40 mg QD    =================Endocrine===============================  Prediabetes:    -a1c  5.8  -BS controlled  -continue HSS  -monitor FS while on steroids    Abnormal TSH:  -TSH level noted 0.26,   -TSH 0.37 and   - Free T4 1.82      ================= Skin/Catheters============================  No rashes. Peripheral IV lines.   central line, a-line, ng tube present 3/29,     - =================Prophylaxis =============================  Lovenox for DVT proph  Protonix for  GI proph    ==================GOC==================================   FULL CODE

## 2021-04-01 DIAGNOSIS — E43 UNSPECIFIED SEVERE PROTEIN-CALORIE MALNUTRITION: ICD-10-CM

## 2021-04-01 DIAGNOSIS — J93.9 PNEUMOTHORAX, UNSPECIFIED: ICD-10-CM

## 2021-04-01 LAB
ALBUMIN SERPL ELPH-MCNC: 1.6 G/DL — LOW (ref 3.5–5)
ALP SERPL-CCNC: 73 U/L — SIGNIFICANT CHANGE UP (ref 40–120)
ALT FLD-CCNC: 119 U/L DA — HIGH (ref 10–60)
ANION GAP SERPL CALC-SCNC: 8 MMOL/L — SIGNIFICANT CHANGE UP (ref 5–17)
AST SERPL-CCNC: 47 U/L — HIGH (ref 10–40)
BILIRUB SERPL-MCNC: 0.5 MG/DL — SIGNIFICANT CHANGE UP (ref 0.2–1.2)
BUN SERPL-MCNC: 29 MG/DL — HIGH (ref 7–18)
CALCIUM SERPL-MCNC: 7.4 MG/DL — LOW (ref 8.4–10.5)
CHLORIDE SERPL-SCNC: 105 MMOL/L — SIGNIFICANT CHANGE UP (ref 96–108)
CO2 SERPL-SCNC: 28 MMOL/L — SIGNIFICANT CHANGE UP (ref 22–31)
CREAT SERPL-MCNC: 0.58 MG/DL — SIGNIFICANT CHANGE UP (ref 0.5–1.3)
FERRITIN SERPL-MCNC: 1205 NG/ML — HIGH (ref 30–400)
FERRITIN SERPL-MCNC: 1284 NG/ML — HIGH (ref 30–400)
GLUCOSE BLDC GLUCOMTR-MCNC: 115 MG/DL — HIGH (ref 70–99)
GLUCOSE BLDC GLUCOMTR-MCNC: 133 MG/DL — HIGH (ref 70–99)
GLUCOSE BLDC GLUCOMTR-MCNC: 145 MG/DL — HIGH (ref 70–99)
GLUCOSE BLDC GLUCOMTR-MCNC: 169 MG/DL — HIGH (ref 70–99)
GLUCOSE SERPL-MCNC: 119 MG/DL — HIGH (ref 70–99)
HCT VFR BLD CALC: 34.4 % — LOW (ref 39–50)
HGB BLD-MCNC: 11.4 G/DL — LOW (ref 13–17)
MAGNESIUM SERPL-MCNC: 2.2 MG/DL — SIGNIFICANT CHANGE UP (ref 1.6–2.6)
MCHC RBC-ENTMCNC: 32.6 PG — SIGNIFICANT CHANGE UP (ref 27–34)
MCHC RBC-ENTMCNC: 33.1 GM/DL — SIGNIFICANT CHANGE UP (ref 32–36)
MCV RBC AUTO: 98.3 FL — SIGNIFICANT CHANGE UP (ref 80–100)
NRBC # BLD: 0 /100 WBCS — SIGNIFICANT CHANGE UP (ref 0–0)
PHOSPHATE SERPL-MCNC: 3 MG/DL — SIGNIFICANT CHANGE UP (ref 2.5–4.5)
PLATELET # BLD AUTO: 176 K/UL — SIGNIFICANT CHANGE UP (ref 150–400)
POTASSIUM SERPL-MCNC: 3.7 MMOL/L — SIGNIFICANT CHANGE UP (ref 3.5–5.3)
POTASSIUM SERPL-SCNC: 3.7 MMOL/L — SIGNIFICANT CHANGE UP (ref 3.5–5.3)
PROCALCITONIN SERPL-MCNC: 0.13 NG/ML — HIGH (ref 0.02–0.1)
PROT SERPL-MCNC: 4.8 G/DL — LOW (ref 6–8.3)
RBC # BLD: 3.5 M/UL — LOW (ref 4.2–5.8)
RBC # FLD: 12.9 % — SIGNIFICANT CHANGE UP (ref 10.3–14.5)
SODIUM SERPL-SCNC: 141 MMOL/L — SIGNIFICANT CHANGE UP (ref 135–145)
WBC # BLD: 12.46 K/UL — HIGH (ref 3.8–10.5)
WBC # FLD AUTO: 12.46 K/UL — HIGH (ref 3.8–10.5)

## 2021-04-01 PROCEDURE — 71045 X-RAY EXAM CHEST 1 VIEW: CPT | Mod: 26

## 2021-04-01 PROCEDURE — 99233 SBSQ HOSP IP/OBS HIGH 50: CPT

## 2021-04-01 RX ORDER — MIDAZOLAM HYDROCHLORIDE 1 MG/ML
0.05 INJECTION, SOLUTION INTRAMUSCULAR; INTRAVENOUS
Qty: 100 | Refills: 0 | Status: DISCONTINUED | OUTPATIENT
Start: 2021-04-01 | End: 2021-04-05

## 2021-04-01 RX ORDER — SODIUM CHLORIDE 9 MG/ML
1000 INJECTION INTRAMUSCULAR; INTRAVENOUS; SUBCUTANEOUS
Refills: 0 | Status: DISCONTINUED | OUTPATIENT
Start: 2021-04-01 | End: 2021-04-05

## 2021-04-01 RX ADMIN — FENTANYL CITRATE 33.5 MICROGRAM(S)/KG/HR: 50 INJECTION INTRAVENOUS at 03:37

## 2021-04-01 RX ADMIN — CHLORHEXIDINE GLUCONATE 1 APPLICATION(S): 213 SOLUTION TOPICAL at 05:05

## 2021-04-01 RX ADMIN — PANTOPRAZOLE SODIUM 40 MILLIGRAM(S): 20 TABLET, DELAYED RELEASE ORAL at 11:20

## 2021-04-01 RX ADMIN — CHLORHEXIDINE GLUCONATE 15 MILLILITER(S): 213 SOLUTION TOPICAL at 05:05

## 2021-04-01 RX ADMIN — PROPOFOL 15.1 MICROGRAM(S)/KG/MIN: 10 INJECTION, EMULSION INTRAVENOUS at 03:37

## 2021-04-01 RX ADMIN — Medication 40 MILLIGRAM(S): at 05:05

## 2021-04-01 RX ADMIN — FENTANYL CITRATE 33.5 MICROGRAM(S)/KG/HR: 50 INJECTION INTRAVENOUS at 18:37

## 2021-04-01 RX ADMIN — PROPOFOL 15.1 MICROGRAM(S)/KG/MIN: 10 INJECTION, EMULSION INTRAVENOUS at 07:55

## 2021-04-01 RX ADMIN — PROPOFOL 15.1 MICROGRAM(S)/KG/MIN: 10 INJECTION, EMULSION INTRAVENOUS at 12:08

## 2021-04-01 RX ADMIN — FENTANYL CITRATE 33.5 MICROGRAM(S)/KG/HR: 50 INJECTION INTRAVENOUS at 01:58

## 2021-04-01 RX ADMIN — FENTANYL CITRATE 33.5 MICROGRAM(S)/KG/HR: 50 INJECTION INTRAVENOUS at 12:05

## 2021-04-01 RX ADMIN — Medication 650 MILLIGRAM(S): at 04:59

## 2021-04-01 RX ADMIN — CHLORHEXIDINE GLUCONATE 15 MILLILITER(S): 213 SOLUTION TOPICAL at 18:01

## 2021-04-01 RX ADMIN — ENOXAPARIN SODIUM 40 MILLIGRAM(S): 100 INJECTION SUBCUTANEOUS at 11:20

## 2021-04-01 RX ADMIN — ALBUTEROL 2 PUFF(S): 90 AEROSOL, METERED ORAL at 04:17

## 2021-04-01 RX ADMIN — Medication 81 MILLIGRAM(S): at 11:20

## 2021-04-01 RX ADMIN — Medication 1000 MILLIGRAM(S): at 11:21

## 2021-04-01 RX ADMIN — Medication 650 MILLIGRAM(S): at 01:05

## 2021-04-01 RX ADMIN — PROPOFOL 15.1 MICROGRAM(S)/KG/MIN: 10 INJECTION, EMULSION INTRAVENOUS at 16:00

## 2021-04-01 RX ADMIN — SODIUM CHLORIDE 75 MILLILITER(S): 9 INJECTION INTRAMUSCULAR; INTRAVENOUS; SUBCUTANEOUS at 06:10

## 2021-04-01 RX ADMIN — PROPOFOL 15.1 MICROGRAM(S)/KG/MIN: 10 INJECTION, EMULSION INTRAVENOUS at 01:05

## 2021-04-01 RX ADMIN — Medication 1: at 11:06

## 2021-04-01 RX ADMIN — ALBUTEROL 2 PUFF(S): 90 AEROSOL, METERED ORAL at 21:04

## 2021-04-01 RX ADMIN — PROPOFOL 15.1 MICROGRAM(S)/KG/MIN: 10 INJECTION, EMULSION INTRAVENOUS at 18:36

## 2021-04-01 NOTE — PROGRESS NOTE ADULT - SUBJECTIVE AND OBJECTIVE BOX
INTERVAL HPI/OVERNIGHT EVENTS:  Patient seen and examined at bedside.   Intubated/sedated, vent setting appreciated.     MEDICATIONS  (STANDING):  ALBUTerol    90 MICROgram(s) HFA Inhaler 2 Puff(s) Inhalation every 6 hours  ascorbic acid 1000 milliGRAM(s) Oral daily  aspirin  chewable 81 milliGRAM(s) Oral daily  chlorhexidine 0.12% Liquid 15 milliLiter(s) Oral Mucosa two times a day  chlorhexidine 2% Cloths 1 Application(s) Topical <User Schedule>  enoxaparin Injectable 40 milliGRAM(s) SubCutaneous daily  fentaNYL   Infusion. 4 MICROgram(s)/kG/Hr (33.5 mL/Hr) IV Continuous <Continuous>  insulin lispro (ADMELOG) corrective regimen sliding scale   SubCutaneous every 6 hours  methylPREDNISolone sodium succinate Injectable 40 milliGRAM(s) IV Push daily  pantoprazole   Suspension 40 milliGRAM(s) Oral daily  propofol Infusion 30.007 MICROgram(s)/kG/Min (15.1 mL/Hr) IV Continuous <Continuous>  sodium chloride 0.9%. 1000 milliLiter(s) (75 mL/Hr) IV Continuous <Continuous>    MEDICATIONS  (PRN):  acetaminophen   Tablet .. 650 milliGRAM(s) Oral every 6 hours PRN Temp greater or equal to 38C (100.4F)  sodium chloride 0.9% lock flush 10 milliLiter(s) IV Push every 1 hour PRN Pre/post blood products, medications, blood draw, and to maintain line patency      Vital Signs Last 24 Hrs  T(C): 36.2 (01 Apr 2021 08:00), Max: 38 (01 Apr 2021 01:00)  T(F): 97.2 (01 Apr 2021 08:00), Max: 100.4 (01 Apr 2021 01:00)  HR: 64 (01 Apr 2021 08:15) (62 - 114)  BP: 103/49 (01 Apr 2021 08:00) (92/48 - 136/57)  BP(mean): 62 (01 Apr 2021 08:00) (59 - 77)  RR: 24 (01 Apr 2021 08:00) (21 - 33)  SpO2: 97% (01 Apr 2021 08:15) (94% - 100%)    Physical:  General: Sedated  Respirations: Intubated  Abdomen: Soft nondistended, nontender.    I&O's Detail    31 Mar 2021 07:01  -  01 Apr 2021 07:00  --------------------------------------------------------  IN:    FentaNYL: 471.5 mL    FentaNYL: 167.5 mL    Jevity 1.5: 800 mL    Propofol: 640.8 mL    sodium chloride 0.9%: 75 mL  Total IN: 2154.8 mL    OUT:    Indwelling Catheter - Urethral (mL): 1005 mL  Total OUT: 1005 mL    Total NET: 1149.8 mL          LABS:                        11.4   12.46 )-----------( 176      ( 01 Apr 2021 05:00 )             34.4             04-01    141  |  105  |  29<H>  ----------------------------<  119<H>  3.7   |  28  |  0.58    Ca    7.4<L>      01 Apr 2021 05:00  Phos  3.0     04-01  Mg     2.2     04-01    TPro  4.8<L>  /  Alb  1.6<L>  /  TBili  0.5  /  DBili  x   /  AST  47<H>  /  ALT  119<H>  /  AlkPhos  73  04-01      CXR: 4/1 pending

## 2021-04-01 NOTE — PROGRESS NOTE ADULT - SUBJECTIVE AND OBJECTIVE BOX
INTERVAL HPI/OVERNIGHT EVENTS: ***    PRESSORS: [ ] YES [ ] NO  WHICH:    ANTIBIOTICS:                      Antimicrobial:    Cardiovascular:    Pulmonary:  ALBUTerol    90 MICROgram(s) HFA Inhaler 2 Puff(s) Inhalation every 6 hours    Hematalogic:  aspirin  chewable 81 milliGRAM(s) Oral daily  enoxaparin Injectable 40 milliGRAM(s) SubCutaneous daily    Other:  acetaminophen   Tablet .. 650 milliGRAM(s) Oral every 6 hours PRN  ascorbic acid 1000 milliGRAM(s) Oral daily  chlorhexidine 0.12% Liquid 15 milliLiter(s) Oral Mucosa two times a day  chlorhexidine 2% Cloths 1 Application(s) Topical <User Schedule>  fentaNYL   Infusion. 4 MICROgram(s)/kG/Hr IV Continuous <Continuous>  insulin lispro (ADMELOG) corrective regimen sliding scale   SubCutaneous every 6 hours  methylPREDNISolone sodium succinate Injectable 40 milliGRAM(s) IV Push daily  pantoprazole   Suspension 40 milliGRAM(s) Oral daily  propofol Infusion 30.007 MICROgram(s)/kG/Min IV Continuous <Continuous>  sodium chloride 0.9% lock flush 10 milliLiter(s) IV Push every 1 hour PRN  sodium chloride 0.9%. 1000 milliLiter(s) IV Continuous <Continuous>    acetaminophen   Tablet .. 650 milliGRAM(s) Oral every 6 hours PRN  ALBUTerol    90 MICROgram(s) HFA Inhaler 2 Puff(s) Inhalation every 6 hours  ascorbic acid 1000 milliGRAM(s) Oral daily  aspirin  chewable 81 milliGRAM(s) Oral daily  chlorhexidine 0.12% Liquid 15 milliLiter(s) Oral Mucosa two times a day  chlorhexidine 2% Cloths 1 Application(s) Topical <User Schedule>  enoxaparin Injectable 40 milliGRAM(s) SubCutaneous daily  fentaNYL   Infusion. 4 MICROgram(s)/kG/Hr IV Continuous <Continuous>  insulin lispro (ADMELOG) corrective regimen sliding scale   SubCutaneous every 6 hours  methylPREDNISolone sodium succinate Injectable 40 milliGRAM(s) IV Push daily  pantoprazole   Suspension 40 milliGRAM(s) Oral daily  propofol Infusion 30.007 MICROgram(s)/kG/Min IV Continuous <Continuous>  sodium chloride 0.9% lock flush 10 milliLiter(s) IV Push every 1 hour PRN  sodium chloride 0.9%. 1000 milliLiter(s) IV Continuous <Continuous>    Drug Dosing Weight  Height (cm): 167.6 (14 Mar 2021 12:03)  Weight (kg): 83.869 (14 Mar 2021 12:03)  BMI (kg/m2): 29.9 (14 Mar 2021 12:03)  BSA (m2): 1.93 (14 Mar 2021 12:03)    CENTRAL LINE: [ ] YES [ ] NO  LOCATION:   DATE INSERTED:  REMOVE: [ ] YES [ ] NO  EXPLAIN:    RIVERA: [ ] YES [ ] NO    DATE INSERTED:  REMOVE:  [ ] YES [ ] NO  EXPLAIN:    A-LINE:  [ ] YES [ ] NO  LOCATION:   DATE INSERTED:  REMOVE:  [ ] YES [ ] NO  EXPLAIN:    PMH -reviewed admission note, no change since admission    ICU Vital Signs Last 24 Hrs  T(C): 36.2 (01 Apr 2021 08:00), Max: 38 (01 Apr 2021 01:00)  T(F): 97.2 (01 Apr 2021 08:00), Max: 100.4 (01 Apr 2021 01:00)  HR: 65 (01 Apr 2021 08:00) (62 - 114)  BP: 103/49 (01 Apr 2021 08:00) (92/48 - 136/57)  BP(mean): 62 (01 Apr 2021 08:00) (59 - 77)  ABP: 114/57 (01 Apr 2021 08:00) (102/56 - 179/85)  ABP(mean): 73 (01 Apr 2021 08:00) (69 - 113)  RR: 24 (01 Apr 2021 08:00) (21 - 33)  SpO2: 98% (01 Apr 2021 08:00) (94% - 100%)      ABG - ( 31 Mar 2021 04:29 )  pH, Arterial: 7.42  pH, Blood: x     /  pCO2: 40    /  pO2: 99    / HCO3: 25    / Base Excess: 0.9   /  SaO2: 97                    03-31 @ 07:01  -  04-01 @ 07:00  --------------------------------------------------------  IN: 2154.8 mL / OUT: 1005 mL / NET: 1149.8 mL        Mode: AC/ CMV (Assist Control/ Continuous Mandatory Ventilation)  RR (machine): 24  TV (machine): 500  FiO2: 60  PEEP: 8  ITime: 0.8  MAP: 12  PIP: 28      PHYSICAL EXAM:    GENERAL: NAD, well-groomed, well-developed  HEAD:  Atraumatic, Normocephalic  EYES: EOMI, PERRLA, conjunctiva and sclera clear  ENMT: No tonsillar erythema, exudates, or enlargement; Moist mucous membranes, Good dentition, No lesions  NECK: Supple, normal appearance, No JVD; Normal thyroid; Trachea midline  NERVOUS SYSTEM:  Alert & Oriented X3, Good concentration; Motor Strength 5/5 B/L upper and lower extremities; DTRs 2+ intact and symmetric  CHEST/LUNG: No chest deformity; Normal percussion bilaterally; No rales, rhonchi, wheezing   HEART: Regular rate and rhythm; No murmurs, rubs, or gallops  ABDOMEN: Soft, Nontender, Nondistended; Bowel sounds present  EXTREMITIES:  2+ Peripheral Pulses, No clubbing, cyanosis, or edema  LYMPH: No lymphadenopathy noted  SKIN: No rashes or lesions; Good capillary refill      LABS:  CBC Full  -  ( 01 Apr 2021 05:00 )  WBC Count : 12.46 K/uL  RBC Count : 3.50 M/uL  Hemoglobin : 11.4 g/dL  Hematocrit : 34.4 %  Platelet Count - Automated : 176 K/uL  Mean Cell Volume : 98.3 fl  Mean Cell Hemoglobin : 32.6 pg  Mean Cell Hemoglobin Concentration : 33.1 gm/dL  Auto Neutrophil # : x  Auto Lymphocyte # : x  Auto Monocyte # : x  Auto Eosinophil # : x  Auto Basophil # : x  Auto Neutrophil % : x  Auto Lymphocyte % : x  Auto Monocyte % : x  Auto Eosinophil % : x  Auto Basophil % : x    04-01    141  |  105  |  29<H>  ----------------------------<  119<H>  3.7   |  28  |  0.58    Ca    7.4<L>      01 Apr 2021 05:00  Phos  3.0     04-01  Mg     2.2     04-01    TPro  4.8<L>  /  Alb  1.6<L>  /  TBili  0.5  /  DBili  x   /  AST  47<H>  /  ALT  119<H>  /  AlkPhos  73  04-01            RADIOLOGY & ADDITIONAL STUDIES REVIEWED:  ***    GOALS OF CARE DISCUSSION WITH PATIENT/FAMILY/PROXY:    CRITICAL CARE TIME SPENT: 35 minutes INTERVAL HPI/OVERNIGHT EVENTS: Pt saturating in 90s overnight.         Pulmonary:  ALBUTerol    90 MICROgram(s) HFA Inhaler 2 Puff(s) Inhalation every 6 hours    Hematalogic:  aspirin  chewable 81 milliGRAM(s) Oral daily  enoxaparin Injectable 40 milliGRAM(s) SubCutaneous daily    Other:  acetaminophen   Tablet .. 650 milliGRAM(s) Oral every 6 hours PRN  ascorbic acid 1000 milliGRAM(s) Oral daily  chlorhexidine 0.12% Liquid 15 milliLiter(s) Oral Mucosa two times a day  chlorhexidine 2% Cloths 1 Application(s) Topical <User Schedule>  fentaNYL   Infusion. 4 MICROgram(s)/kG/Hr IV Continuous <Continuous>  insulin lispro (ADMELOG) corrective regimen sliding scale   SubCutaneous every 6 hours  methylPREDNISolone sodium succinate Injectable 40 milliGRAM(s) IV Push daily  pantoprazole   Suspension 40 milliGRAM(s) Oral daily  propofol Infusion 30.007 MICROgram(s)/kG/Min IV Continuous <Continuous>  sodium chloride 0.9% lock flush 10 milliLiter(s) IV Push every 1 hour PRN  sodium chloride 0.9%. 1000 milliLiter(s) IV Continuous <Continuous>    acetaminophen   Tablet .. 650 milliGRAM(s) Oral every 6 hours PRN  ALBUTerol    90 MICROgram(s) HFA Inhaler 2 Puff(s) Inhalation every 6 hours  ascorbic acid 1000 milliGRAM(s) Oral daily  aspirin  chewable 81 milliGRAM(s) Oral daily  chlorhexidine 0.12% Liquid 15 milliLiter(s) Oral Mucosa two times a day  chlorhexidine 2% Cloths 1 Application(s) Topical <User Schedule>  enoxaparin Injectable 40 milliGRAM(s) SubCutaneous daily  fentaNYL   Infusion. 4 MICROgram(s)/kG/Hr IV Continuous <Continuous>  insulin lispro (ADMELOG) corrective regimen sliding scale   SubCutaneous every 6 hours  methylPREDNISolone sodium succinate Injectable 40 milliGRAM(s) IV Push daily  pantoprazole   Suspension 40 milliGRAM(s) Oral daily  propofol Infusion 30.007 MICROgram(s)/kG/Min IV Continuous <Continuous>  sodium chloride 0.9% lock flush 10 milliLiter(s) IV Push every 1 hour PRN  sodium chloride 0.9%. 1000 milliLiter(s) IV Continuous <Continuous>    Drug Dosing Weight  Height (cm): 167.6 (14 Mar 2021 12:03)  Weight (kg): 83.869 (14 Mar 2021 12:03)  BMI (kg/m2): 29.9 (14 Mar 2021 12:03)  BSA (m2): 1.93 (14 Mar 2021 12:03)    CENTRAL LINE: [x ] YES [ ] NO  LOCATION: [facility]   DATE INSERTED: 3/29  REMOVE: [ ] YES [ ] NO  EXPLAIN:    RIVERA: [x ] YES [ ] NO    DATE INSERTED:  REMOVE:  [ ] YES [ ] NO  EXPLAIN:    A-LINE:  [x ] YES [ ] NO  LOCATION: [facility]  DATE INSERTED: 3/29  REMOVE:  [ ] YES [ ] NO  EXPLAIN:    PMH -reviewed admission note, no change since admission    ICU Vital Signs Last 24 Hrs  T(C): 36.2 (01 Apr 2021 08:00), Max: 38 (01 Apr 2021 01:00)  T(F): 97.2 (01 Apr 2021 08:00), Max: 100.4 (01 Apr 2021 01:00)  HR: 65 (01 Apr 2021 08:00) (62 - 114)  BP: 103/49 (01 Apr 2021 08:00) (92/48 - 136/57)  BP(mean): 62 (01 Apr 2021 08:00) (59 - 77)  ABP: 114/57 (01 Apr 2021 08:00) (102/56 - 179/85)  ABP(mean): 73 (01 Apr 2021 08:00) (69 - 113)  RR: 24 (01 Apr 2021 08:00) (21 - 33)  SpO2: 98% (01 Apr 2021 08:00) (94% - 100%)      ABG - ( 31 Mar 2021 04:29 )  pH, Arterial: 7.42  pH, Blood: x     /  pCO2: 40    /  pO2: 99    / HCO3: 25    / Base Excess: 0.9   /  SaO2: 97                    03-31 @ 07:01  -  04-01 @ 07:00  --------------------------------------------------------  IN: 2154.8 mL / OUT: 1005 mL / NET: 1149.8 mL        Mode: AC/ CMV (Assist Control/ Continuous Mandatory Ventilation)  RR (machine): 24  TV (machine): 500  FiO2: 60  PEEP: 8  ITime: 0.8  MAP: 12  PIP: 28      PHYSICAL EXAM:    GENERAL: sedated and intuated  HEAD:  Atraumatic, Normocephalic  EYES: EOMI, PERRLA, conjunctiva and sclera clear  ENMT: No tonsillar erythema, exudates, or enlargement; blood at nostrils  NECK: Supple, normal appearance,   NERVOUS SYSTEM: sedated and intubated  CHEST/LUNG: Pt has breath sounds on both sides   HEART: s1,s2  No murmurs, rubs, or gallops  ABDOMEN: Soft, Nontender, Nondistended;  EXTREMITIES:  2+ Peripheral Pulses, No clubbing, cyanosis, or edema  LYMPH: No lymphadenopathy noted  SKIN: No rashes or lesions; Good capillary refill      LABS:  CBC Full  -  ( 01 Apr 2021 05:00 )  WBC Count : 12.46 K/uL  RBC Count : 3.50 M/uL  Hemoglobin : 11.4 g/dL  Hematocrit : 34.4 %  Platelet Count - Automated : 176 K/uL  Mean Cell Volume : 98.3 fl  Mean Cell Hemoglobin : 32.6 pg  Mean Cell Hemoglobin Concentration : 33.1 gm/dL  Auto Neutrophil # : x  Auto Lymphocyte # : x  Auto Monocyte # : x  Auto Eosinophil # : x  Auto Basophil # : x  Auto Neutrophil % : x  Auto Lymphocyte % : x  Auto Monocyte % : x  Auto Eosinophil % : x  Auto Basophil % : x    04-01    141  |  105  |  29<H>  ----------------------------<  119<H>  3.7   |  28  |  0.58    Ca    7.4<L>      01 Apr 2021 05:00  Phos  3.0     04-01  Mg     2.2     04-01    TPro  4.8<L>  /  Alb  1.6<L>  /  TBili  0.5  /  DBili  x   /  AST  47<H>  /  ALT  119<H>  /  AlkPhos  73  04-01      CRITICAL CARE TIME SPENT: 35 minutes

## 2021-04-01 NOTE — PROGRESS NOTE ADULT - PROBLEM SELECTOR PLAN 2
2/2 covid-19. 3/31 CXR indicated Persistent retrocardiac infiltrate; improvement in subcutaneous emphysema. WBC 12.46. Patient sedated/intubated (ETT 3/29); continues solu-medrol and albuterol. Guarded prognosis. Full code.

## 2021-04-01 NOTE — PROGRESS NOTE ADULT - ASSESSMENT
ASSESSMENT AND PLAN:  55M, no PMH, PSH appy and moody 1990s presented 3/14 with x9 days worsening cough, subjective fevers, and SOB, with x2-3 days dysuria and central, non-radiating, constant CP. Admitted to [facility] for acute hypoxic respiratory failure s/t covid pneumonia, ICU consulted for increasing work of breathing on 15 lpm NRM.     1. Acute hypoxic respiratory failure  2. Covid pneumonia  3. Transaminitis  4. Prediabetes  5. Abnormal TSH    =================== Neuro============================  Alert and oriented x 3 at baseline     intubated and sedated 3/29      ================= Cardiovascular==========================  Hypertension  resolved  - Initially, noted to have multiple elevated BP readings  -Continue to monitor, may consider low dose acei/arb if hypertensive     TACHYCARDIA:  pT HR in 80s   will keep monitoring         ================- Pulm=================================  Acute hypoxic respiratory failure: secondary to covid pneumonia  -was on  HFNr then got intubated 3/29  -D-dimer initially elevated on presentation, now wnl  -Remdesivir was discontinued due to positive antibodies   - add albuterol prn   -procalcitonin wnl, d-dimer in 200s  -started pulse steroids for 3 days - 250mg solumedrol bid- completed3/27  - solumedrol 40 bid now titrated down to once daily    #Pneumothorax and pneumomediastinum:  xray chest : tiny left apical pneumothorax  thoracic surgery consulted recommended no intervention at this time, just observation for now  xray monitoring daily    ==================ID===================================  Covid pneumonia  - leukocytosis 2/2 steroids  -remains afebrile  -plan as above     ================= Nephro================================  -No issues   -monitor lytes, SCr   -monitor I/Os    =================GI====================================  Transaminitis:   likely secondary to covid   - and  on presentation  -Improving-  -continue to monitor,  -  hepatitis panel -ve    diet:   ng tube and tube feed    ================ Heme==================================  Elevated d-dimer: likely secondary to covid  -d-dimer 423 on presentation, now wnl  -continue prophylactic Lovenox 40 mg QD    =================Endocrine===============================  Prediabetes:    -a1c  5.8  -BS controlled  -continue HSS  -monitor FS while on steroids    Abnormal TSH:  -TSH level noted 0.26,   -TSH 0.37 and   - Free T4 1.82      ================= Skin/Catheters============================  No rashes. Peripheral IV lines.   central line, a-line, ng tube present 3/29,     - =================Prophylaxis =============================  Lovenox for DVT proph  Protonix for  GI proph    ==================GOC==================================   FULL CODE ASSESSMENT AND PLAN:  55M, no PMH, PSH appy and moody 1990s presented 3/14 with x9 days worsening cough, subjective fevers, and SOB, with x2-3 days dysuria and central, non-radiating, constant CP. Admitted to [facility] for acute hypoxic respiratory failure s/t covid pneumonia, ICU consulted for increasing work of breathing on 15 lpm NRM.     1. Acute hypoxic respiratory failure  2. Covid pneumonia  3. Transaminitis  4. Prediabetes  5. Abnormal TSH    =================== Neuro============================  Alert and oriented x 3 at baseline     intubated and sedated 3/29      ================= Cardiovascular==========================  Hypertension  not on any pressor support, maintaining map above 60    TACHYCARDIA:  pT HR in 80s   will keep monitoring         ================- Pulm=================================  Acute hypoxic respiratory failure: secondary to covid pneumonia  -was on  HFNr then got intubated 3/29  -D-dimer initially elevated on presentation, now wnl  -Remdesivir was discontinued due to positive antibodies   - add albuterol prn   -procalcitonin wnl, d-dimer in 200s  -started pulse steroids for 3 days - 250mg solumedrol bid- completed3/27  - solumedrol 40 bid now titrated down to once daily    #Pneumothorax and pneumomediastinum:  xray chest: tiny left apical pneumothorax  thoracic surgery consulted recommended no intervention at this time, just observation for now  xray monitoring daily    ==================ID===================================  Covid pneumonia  - leukocytosis 2/2 steroids  - afebrile overnight to 100.4 f  -plan as above     ================= Nephro================================  -No issues   -monitor lytes, SCr   -monitor I/Os    =================GI====================================  Transaminitis:   likely secondary to covid   - and  on presentation  -Improved  -continue to monitor,  -  hepatitis panel -ve    diet:   ng tube and tube feed    ================ Heme==================================  Elevated d-dimer: likely secondary to covid  -d-dimer 423 on presentation, now wnl  -continue prophylactic Lovenox 40 mg QD    =================Endocrine===============================  Prediabetes:    -a1c  5.8  -BS controlled  -continue HSS  -monitor FS while on steroids    Abnormal TSH:  -TSH level noted 0.26,   -TSH 0.37 and   - Free T4 1.82      ================= Skin/Catheters============================  No rashes. Peripheral IV lines.   central line, a-line, ng tube present 3/29,     - =================Prophylaxis =============================  Lovenox for DVT proph  Protonix for  GI proph    ==================GOC==================================   FULL CODE    spoke to brother with [place] interpretor and updated about pt condition.

## 2021-04-01 NOTE — PROGRESS NOTE ADULT - ASSESSMENT
54 yo M with COVID PNA and L tiny apical pneumothorax    -Daily CXRs   -Care as per ICU   -Will discuss with Dr. Melchor

## 2021-04-01 NOTE — CHART NOTE - NSCHARTNOTEFT_GEN_A_CORE
Assessment:   Patient is a 55y old  Male who presents with a chief complaint of SOB (2021 12:09). Covid/ Isolation. Pt intubated 3/29 TF ordered. Flow records indicate TF @40 ml/hr, Propofol @ 37.7 ml/hr (if x24 hrs= 995 kcals fat).       Factors impacting intake: [ ] none [ ] nausea  [ ] vomiting [ ] diarrhea [ ] constipation  [ ]chewing problems [ ] swallowing issues  [x ] other: Covid/ intubated/ TF, sedation with Propofol    Diet Prescription: Diet, NPO with Tube Feed:   Tube Feeding Modality: Nasogastric  Jevity 1.5 Leonardo  Total Volume for 24 Hours (mL): 960  Continuous  Starting Tube Feed Rate {mL per Hour}: 10  Increase Tube Feed Rate by (mL): 10     Every hour  Until Goal Tube Feed Rate (mL per Hour): 40  Tube Feed Duration (in Hours): 24  Tube Feed Start Time: 02:00 (21 @ 01:48)        Daily     Daily Weight in k.4 (2021 08:00)  Weight in k.3 (30 Mar 2021 07:00)  Weight in k.5 (29 Mar 2021 07:00)  Weight in k.3 (24 Mar 2021 09:00)  Weight in k.1 (22 Mar 2021 08:00)  Weight in k.6 (21 Mar 2021 08:00)  Weight in k.8 (20 Mar 2021 09:00)  Weight in k.3 (19 Mar 2021 12:10)    % Weight Change: I>O, 1+ generalized edema noted    Pertinent Medications: MEDICATIONS  (STANDING):  ALBUTerol    90 MICROgram(s) HFA Inhaler 2 Puff(s) Inhalation every 6 hours  ascorbic acid 1000 milliGRAM(s) Oral daily  aspirin  chewable 81 milliGRAM(s) Oral daily  chlorhexidine 0.12% Liquid 15 milliLiter(s) Oral Mucosa two times a day  chlorhexidine 2% Cloths 1 Application(s) Topical <User Schedule>  enoxaparin Injectable 40 milliGRAM(s) SubCutaneous daily  fentaNYL   Infusion. 4 MICROgram(s)/kG/Hr (33.5 mL/Hr) IV Continuous <Continuous>  insulin lispro (ADMELOG) corrective regimen sliding scale   SubCutaneous every 6 hours  methylPREDNISolone sodium succinate Injectable 40 milliGRAM(s) IV Push daily  pantoprazole   Suspension 40 milliGRAM(s) Oral daily  propofol Infusion 30.007 MICROgram(s)/kG/Min (15.1 mL/Hr) IV Continuous <Continuous>  sodium chloride 0.9%. 1000 milliLiter(s) (75 mL/Hr) IV Continuous <Continuous>    MEDICATIONS  (PRN):  acetaminophen   Tablet .. 650 milliGRAM(s) Oral every 6 hours PRN Temp greater or equal to 38C (100.4F)  sodium chloride 0.9% lock flush 10 milliLiter(s) IV Push every 1 hour PRN Pre/post blood products, medications, blood draw, and to maintain line patency    Pertinent Labs:  Na141 mmol/L Glu 119 mg/dL<H> K+ 3.7 mmol/L Cr  0.58 mg/dL BUN 29 mg/dL<H>  Phos 3.0 mg/dL  Alb 1.6 g/dL<L> 03-15 Chol 102 mg/dL LDL --    HDL 37 mg/dL<L> Trig 100 mg/dL     CAPILLARY BLOOD GLUCOSE      POCT Blood Glucose.: 183 mg/dL (2021 10:52)  POCT Blood Glucose.: 115 mg/dL (2021 04:56)  POCT Blood Glucose.: 135 mg/dL (31 Mar 2021 23:06)  POCT Blood Glucose.: 166 mg/dL (31 Mar 2021 16:58)        Estimated Needs:   [x ] no change since previous assessment (3/29).  [ ] recalculated:       Previous Nutrition Diagnosis:   [ ] Altered GI function  [ ]Inadequate Oral Intake [ ] Swallowing Difficulty   [ ] Altered nutrition related labs [ ] Increased Nutrient Needs [ ] Overweight/Obesity   [ ] Unintended Weight Loss [ ] Food & Nutrition Related Knowledge Deficit [x ] Malnutrition (moderate)  [ ] Other:     Nutrition Diagnosis is [ ] ongoing  [ ] resolved [ ] not applicable     New Nutrition Diagnosis: [x ] PCM (severe) with ongoing hospitalization (ICU), now intubated. Greater loss lean body mass masked by generalized edema, I>O.    Interventions:   Recommend  [ ] Change Diet To:  [ ] Nutrition Supplement  [x ] Nutrition Support: With Propofol @37.7 ml/hr: Glucerna 1.5 25x24 + 1 Prosource BID (+30 gm protein, 120 kcals). MD to monitor. RD available.   [x ] Other: Triglyceride level monitoring on Propofol    Monitoring and Evaluation:   [ x ] Tolerance to diet prescription [ x ] weights [ x ] labs[ x ] follow up per protocol  [ ] other:

## 2021-04-01 NOTE — PROGRESS NOTE ADULT - SUBJECTIVE AND OBJECTIVE BOX
Patient is a 55y old  Male who presents with a chief complaint of SOB (31 Mar 2021 13:13)    pt seen in icu [ x ], reg med floor [   ], bed [ x ], chair at bedside [   ], a+o x3 [  ], sedated [x  ],  nad [x  ]    andrea [ x ], ngt [x  ], et tube [ x ], cent line [x  ],        Allergies    No Known Allergies        Vitals    T(F): 98 (04-01-21 @ 04:00), Max: 100.4 (04-01-21 @ 01:00)  HR: 67 (04-01-21 @ 06:00) (62 - 114)  BP: 92/48 (04-01-21 @ 04:00) (92/48 - 136/57)  RR: 24 (04-01-21 @ 06:00) (21 - 33)  SpO2: 98% (04-01-21 @ 06:00) (73% - 100%)  Wt(kg): --  CAPILLARY BLOOD GLUCOSE      POCT Blood Glucose.: 115 mg/dL (01 Apr 2021 04:56)      Labs                          11.4   12.46 )-----------( 176      ( 01 Apr 2021 05:00 )             34.4       04-01    141  |  105  |  29<H>  ----------------------------<  119<H>  3.7   |  28  |  0.58    Ca    7.4<L>      01 Apr 2021 05:00  Phos  3.0     04-01  Mg     2.2     04-01    TPro  4.8<L>  /  Alb  1.6<L>  /  TBili  0.5  /  DBili  x   /  AST  47<H>  /  ALT  119<H>  /  AlkPhos  73  04-01            .Urine Clean Catch (Midstream)  03-15 @ 00:52   No growth  --  --          Radiology Results      Meds    MEDICATIONS  (STANDING):  ALBUTerol    90 MICROgram(s) HFA Inhaler 2 Puff(s) Inhalation every 6 hours  ascorbic acid 1000 milliGRAM(s) Oral daily  aspirin  chewable 81 milliGRAM(s) Oral daily  chlorhexidine 0.12% Liquid 15 milliLiter(s) Oral Mucosa two times a day  chlorhexidine 2% Cloths 1 Application(s) Topical <User Schedule>  enoxaparin Injectable 40 milliGRAM(s) SubCutaneous daily  fentaNYL   Infusion. 4 MICROgram(s)/kG/Hr (33.5 mL/Hr) IV Continuous <Continuous>  insulin lispro (ADMELOG) corrective regimen sliding scale   SubCutaneous every 6 hours  methylPREDNISolone sodium succinate Injectable 40 milliGRAM(s) IV Push daily  pantoprazole   Suspension 40 milliGRAM(s) Oral daily  propofol Infusion 30.007 MICROgram(s)/kG/Min (15.1 mL/Hr) IV Continuous <Continuous>  sodium chloride 0.9%. 1000 milliLiter(s) (75 mL/Hr) IV Continuous <Continuous>      MEDICATIONS  (PRN):  acetaminophen   Tablet .. 650 milliGRAM(s) Oral every 6 hours PRN Temp greater or equal to 38C (100.4F)  sodium chloride 0.9% lock flush 10 milliLiter(s) IV Push every 1 hour PRN Pre/post blood products, medications, blood draw, and to maintain line patency      Physical Exam    Neuro :  no focal deficits  Respiratory: CTA B/L  CV: RRR, S1S2, no murmurs,   Abdominal: Soft, NT, ND +BS,  Extremities: No edema, + peripheral pulses      ASSESSMENT    Hypoxemia 2nd to covid pna   transaminitis  prediabetes  h/o appendectomy  cholecystectomy        PLAN    contact and airborne isolation  d/c remdesevir given covid ab positive noted   completed dexamethasone   started pulse steroids for 3 days - 250mg solumedrol bid  cont asa, vit c,    cont albuterol inhaler   pulm f/u  procalcitonin, D-dimer, crp, ldh, ferritin, lactate noted ,    tmx 99.3  cont tylenol prn,   cont robitussin prn   pt on fentanyl and propofol drip  s/p intubation 3/29/21  O2 sat (73% - 99%) mech vent  O2 via mech vent   xray 3/19/21 with pneumomediastinum  rept cxr with New trace right apical pneumothorax. New mild left apical pneumothorax. Grossly stable small pneumomediastinum.  Soft tissue emphysema at the neck bases bilaterally. Grossly stable bilateral pulmonary infiltrates noted.   cxr 2/24 with No evidence of pneumothorax can be appreciated on the available image. This may be related to patient positioning. Evidence of pneumomediastinum and subcutaneous emphysema in the lower neck is again noted. There are patchy bibasilar infiltrates and elevated right hemidiaphragm noted.   cxr 3/29/21 with No significant change bilateral infiltrates. There is a small simple left apical pneumothorax. No significant pleural effusion. Bilateral subcutaneous emphysema similar to prior.   thoracic surg f/u   pt intubated 6AM, XRs on 7AM and 5PM with no obvious increase of ptx  no immediate need for CT at this time,   recommend close monitoring with serial CXRs   lispro ss  cont current meds  cont mgmt as per icu         Patient is a 55y old  Male who presents with a chief complaint of SOB (31 Mar 2021 13:13)    pt seen in icu [ x ], reg med floor [   ], bed [ x ], chair at bedside [   ], a+o x3 [  ], sedated [x  ],  nad [x  ]    andrea [ x ], ngt [x  ], et tube [ x ], cent line [x  ],        Allergies    No Known Allergies        Vitals    T(F): 98 (04-01-21 @ 04:00), Max: 100.4 (04-01-21 @ 01:00)  HR: 67 (04-01-21 @ 06:00) (62 - 114)  BP: 92/48 (04-01-21 @ 04:00) (92/48 - 136/57)  RR: 24 (04-01-21 @ 06:00) (21 - 33)  SpO2: 98% (04-01-21 @ 06:00) (73% - 100%)  Wt(kg): --  CAPILLARY BLOOD GLUCOSE      POCT Blood Glucose.: 115 mg/dL (01 Apr 2021 04:56)      Labs                          11.4   12.46 )-----------( 176      ( 01 Apr 2021 05:00 )             34.4       04-01    141  |  105  |  29<H>  ----------------------------<  119<H>  3.7   |  28  |  0.58    Ca    7.4<L>      01 Apr 2021 05:00  Phos  3.0     04-01  Mg     2.2     04-01    TPro  4.8<L>  /  Alb  1.6<L>  /  TBili  0.5  /  DBili  x   /  AST  47<H>  /  ALT  119<H>  /  AlkPhos  73  04-01            .Urine Clean Catch (Midstream)  03-15 @ 00:52   No growth  --  --          Radiology Results      Meds    MEDICATIONS  (STANDING):  ALBUTerol    90 MICROgram(s) HFA Inhaler 2 Puff(s) Inhalation every 6 hours  ascorbic acid 1000 milliGRAM(s) Oral daily  aspirin  chewable 81 milliGRAM(s) Oral daily  chlorhexidine 0.12% Liquid 15 milliLiter(s) Oral Mucosa two times a day  chlorhexidine 2% Cloths 1 Application(s) Topical <User Schedule>  enoxaparin Injectable 40 milliGRAM(s) SubCutaneous daily  fentaNYL   Infusion. 4 MICROgram(s)/kG/Hr (33.5 mL/Hr) IV Continuous <Continuous>  insulin lispro (ADMELOG) corrective regimen sliding scale   SubCutaneous every 6 hours  methylPREDNISolone sodium succinate Injectable 40 milliGRAM(s) IV Push daily  pantoprazole   Suspension 40 milliGRAM(s) Oral daily  propofol Infusion 30.007 MICROgram(s)/kG/Min (15.1 mL/Hr) IV Continuous <Continuous>  sodium chloride 0.9%. 1000 milliLiter(s) (75 mL/Hr) IV Continuous <Continuous>      MEDICATIONS  (PRN):  acetaminophen   Tablet .. 650 milliGRAM(s) Oral every 6 hours PRN Temp greater or equal to 38C (100.4F)  sodium chloride 0.9% lock flush 10 milliLiter(s) IV Push every 1 hour PRN Pre/post blood products, medications, blood draw, and to maintain line patency      Physical Exam    Neuro :  no focal deficits  Respiratory: CTA B/L  CV: RRR, S1S2, no murmurs,   Abdominal: Soft, NT, ND +BS,  Extremities: No edema, + peripheral pulses      ASSESSMENT    Hypoxemia 2nd to covid pna   transaminitis  prediabetes  h/o appendectomy  cholecystectomy        PLAN    contact and airborne isolation  d/c remdesevir given covid ab positive noted   completed dexamethasone   started pulse steroids for 3 days - 250mg solumedrol bid  cont asa, vit c,    cont albuterol inhaler   pulm f/u  procalcitonin, D-dimer, crp, ldh, ferritin, lactate noted ,    tmx 100.4  cont tylenol prn,   cont robitussin prn   pt on fentanyl and propofol drip  s/p intubation 3/29/21  O2 sat (73% - 100%) mech vent  O2 via mech vent   xray 3/19/21 with pneumomediastinum  rept cxr with New trace right apical pneumothorax. New mild left apical pneumothorax. Grossly stable small pneumomediastinum.  Soft tissue emphysema at the neck bases bilaterally. Grossly stable bilateral pulmonary infiltrates noted.   cxr 2/24 with No evidence of pneumothorax can be appreciated on the available image. This may be related to patient positioning. Evidence of pneumomediastinum and subcutaneous emphysema in the lower neck is again noted. There are patchy bibasilar infiltrates and elevated right hemidiaphragm noted.   cxr 3/29/21 with No significant change bilateral infiltrates. There is a small simple left apical pneumothorax. No significant pleural effusion. Bilateral subcutaneous emphysema similar to prior.   thoracic surg f/u   pt intubated 6AM 3/29/21, XRs on 7AM and 5PM with no obvious increase of ptx  no immediate need for CT at this time,   recommend close monitoring with serial CXRs   lispro ss  cont current meds  cont mgmt as per icu

## 2021-04-01 NOTE — PROGRESS NOTE ADULT - SUBJECTIVE AND OBJECTIVE BOX
OVERNIGHT EVENTS:    Present Symptoms:   Dyspnea:   Nausea/Vomiting:   Anxiety:    Depressed Mood:   Fatigue:   Loss of appetite:   Pain:                   location:   Review of Systems: [All others negative or Unable to obtain due to poor mentation]    MEDICATIONS  (STANDING):  ALBUTerol    90 MICROgram(s) HFA Inhaler 2 Puff(s) Inhalation every 6 hours  ascorbic acid 1000 milliGRAM(s) Oral daily  aspirin  chewable 81 milliGRAM(s) Oral daily  chlorhexidine 0.12% Liquid 15 milliLiter(s) Oral Mucosa two times a day  chlorhexidine 2% Cloths 1 Application(s) Topical <User Schedule>  enoxaparin Injectable 40 milliGRAM(s) SubCutaneous daily  fentaNYL   Infusion. 4 MICROgram(s)/kG/Hr (33.5 mL/Hr) IV Continuous <Continuous>  insulin lispro (ADMELOG) corrective regimen sliding scale   SubCutaneous every 6 hours  methylPREDNISolone sodium succinate Injectable 40 milliGRAM(s) IV Push daily  pantoprazole   Suspension 40 milliGRAM(s) Oral daily  propofol Infusion 30.007 MICROgram(s)/kG/Min (15.1 mL/Hr) IV Continuous <Continuous>  sodium chloride 0.9%. 1000 milliLiter(s) (75 mL/Hr) IV Continuous <Continuous>    MEDICATIONS  (PRN):  acetaminophen   Tablet .. 650 milliGRAM(s) Oral every 6 hours PRN Temp greater or equal to 38C (100.4F)  sodium chloride 0.9% lock flush 10 milliLiter(s) IV Push every 1 hour PRN Pre/post blood products, medications, blood draw, and to maintain line patency      PHYSICAL EXAM:  Vital Signs Last 24 Hrs  T(C): 36.2 (01 Apr 2021 08:00), Max: 38 (01 Apr 2021 01:00)  T(F): 97.2 (01 Apr 2021 08:00), Max: 100.4 (01 Apr 2021 01:00)  HR: 110 (01 Apr 2021 11:00) (62 - 114)  BP: 103/49 (01 Apr 2021 08:00) (92/48 - 136/57)  BP(mean): 62 (01 Apr 2021 08:00) (59 - 77)  RR: 17 (01 Apr 2021 11:00) (16 - 33)  SpO2: 89% (01 Apr 2021 11:00) (89% - 100%)  General: alert  oriented x ____    [lethargic distressed cachexia  nonverbal  unarousable verbal]  Karnofsky Performance Score/Palliative Performance Status Version2:    %    HEENT: normal  dry mouth  ET tube/trach oral lesions:  Lungs: comfortable tachypnea/labored breathing  excessive secretions  CV: normal  tachycardia  GI: normal  distended  tender  incontinent               PEG/NG/OG tube  constipation  last BM:   : normal  incontinent  oliguria/anuria  andrea  Musculoskeletal: normal  weakness  edema             ambulatory  bedbound/wheelchair bound  Skin: normal  pressure ulcers: stage: edema: other:  Neuro: no deficits cognitive impairment dsyphagia/dysarthria paresis: other:  Oral intake ability: unable/only mouth care [minimal moderate full capability]  Diet: NPO,     LABS:                          11.4   12.46 )-----------( 176      ( 01 Apr 2021 05:00 )             34.4     04-01    141  |  105  |  29<H>  ----------------------------<  119<H>  3.7   |  28  |  0.58    Ca    7.4<L>      01 Apr 2021 05:00  Phos  3.0     04-01  Mg     2.2     04-01    TPro  4.8<L>  /  Alb  1.6<L>  /  TBili  0.5  /  DBili  x   /  AST  47<H>  /  ALT  119<H>  /  AlkPhos  73  04-01        RADIOLOGY & ADDITIONAL STUDIES:    ADVANCE DIRECTIVES:  Advanced Care Planning discussion total time spent:     OVERNIGHT EVENTS: no acute events    Present Symptoms:   Review of Systems: Unable to obtain - patient sedated/intubated     MEDICATIONS  (STANDING):  ALBUTerol    90 MICROgram(s) HFA Inhaler 2 Puff(s) Inhalation every 6 hours  ascorbic acid 1000 milliGRAM(s) Oral daily  aspirin  chewable 81 milliGRAM(s) Oral daily  chlorhexidine 0.12% Liquid 15 milliLiter(s) Oral Mucosa two times a day  chlorhexidine 2% Cloths 1 Application(s) Topical <User Schedule>  enoxaparin Injectable 40 milliGRAM(s) SubCutaneous daily  fentaNYL   Infusion. 4 MICROgram(s)/kG/Hr (33.5 mL/Hr) IV Continuous <Continuous>  insulin lispro (ADMELOG) corrective regimen sliding scale   SubCutaneous every 6 hours  methylPREDNISolone sodium succinate Injectable 40 milliGRAM(s) IV Push daily  pantoprazole   Suspension 40 milliGRAM(s) Oral daily  propofol Infusion 30.007 MICROgram(s)/kG/Min (15.1 mL/Hr) IV Continuous <Continuous>  sodium chloride 0.9%. 1000 milliLiter(s) (75 mL/Hr) IV Continuous <Continuous>    MEDICATIONS  (PRN):  acetaminophen   Tablet .. 650 milliGRAM(s) Oral every 6 hours PRN Temp greater or equal to 38C (100.4F)  sodium chloride 0.9% lock flush 10 milliLiter(s) IV Push every 1 hour PRN Pre/post blood products, medications, blood draw, and to maintain line patency      PHYSICAL EXAM:  Vital Signs Last 24 Hrs  T(C): 36.2 (01 Apr 2021 08:00), Max: 38 (01 Apr 2021 01:00)  T(F): 97.2 (01 Apr 2021 08:00), Max: 100.4 (01 Apr 2021 01:00)  HR: 110 (01 Apr 2021 11:00) (62 - 114)  BP: 103/49 (01 Apr 2021 08:00) (92/48 - 136/57)  BP(mean): 62 (01 Apr 2021 08:00) (59 - 77)  RR: 17 (01 Apr 2021 11:00) (16 - 33)  SpO2: 89% (01 Apr 2021 11:00) (89% - 100%)  General: Patient not medically stable for full physical exam 2/2 covid-19 status. Patient sedated/intubated.   Karnofsky Performance Score/Palliative Performance Status Version2:     10%    HEENT:  ET tube   Lungs: comfortable, on ventilator. Continues fentanyl, propofol     CV: tachycardia  GI:   incontinent  :   andrea  Musculoskeletal: patient now sedated/intubated, dependent on ADLs  Skin: unable to assess   Neuro:  patient now sedated/intubated, dependent on ADLs  Oral intake ability: unable/only mouth care   Diet: NPO; TF via NGT    LABS:                          11.4   12.46 )-----------( 176      ( 01 Apr 2021 05:00 )             34.4     04-01    141  |  105  |  29<H>  ----------------------------<  119<H>  3.7   |  28  |  0.58    Ca    7.4<L>      01 Apr 2021 05:00  Phos  3.0     04-01  Mg     2.2     04-01    TPro  4.8<L>  /  Alb  1.6<L>  /  TBili  0.5  /  DBili  x   /  AST  47<H>  /  ALT  119<H>  /  AlkPhos  73  04-01    RADIOLOGY & ADDITIONAL STUDIES:  chest< from: Xray Chest 1 View- PORTABLE-Urgent (Xray Chest 1 View- PORTABLE-Urgent .) (03.31.21 @ 06:38) >  EXAM:  XR CHEST PORTABLE URGENT 1V                        PROCEDURE DATE:  03/31/2021    INTERPRETATION:  CLINICAL INDICATION: 55 years  Male with ETT.  COMPARISON: 3/30/2021  The right jugular catheter tip overlies the superior vena cava. The ET tube tip overlies the mid trachea. The tip of the feeding tube is below the diaphragm.  AP view of the chest demonstrates a persistent retrocardiac infiltrate with air bronchograms.. There is no pleural effusion. There is no pneumothorax. Bilateral neck subcutaneous emphysema has improved.  The heart is normal in size. There is no mediastinal or hilar mass.  The pulmonary vasculature is normal.  Mild thoracic degenerative changes are present.    IMPRESSION:  Persistent retrocardiac infiltrate.  Tubes and catheters in satisfactory position.  Improvement in subcutaneous emphysema.  < end of copied text >      ADVANCE DIRECTIVES: full code

## 2021-04-01 NOTE — DIETITIAN NUTRITION RISK NOTIFICATION - TREATMENT: THE FOLLOWING DIET HAS BEEN RECOMMENDED
Diet, NPO with Tube Feed:   Tube Feeding Modality: Nasogastric  Glucerna 1.5 Leonardo  Total Volume for 24 Hours (mL): 600  Continuous  Starting Tube Feed Rate {mL per Hour}: 10  Increase Tube Feed Rate by (mL): 10     Every hour  Until Goal Tube Feed Rate (mL per Hour): 25  Tube Feed Duration (in Hours): 24  Tube Feed Start Time: 02:00  No Carb Prosource (1pkg = 15gms Protein)     Qty per Day:  2 (04-01-21 @ 16:49) [Active]

## 2021-04-01 NOTE — PROGRESS NOTE ADULT - SUBJECTIVE AND OBJECTIVE BOX
Patient is a 55y old  Male who presents with a chief complaint of SOB (01 Apr 2021 09:00)  Patient remains intubated, sedated on mechanical ventilation. FIO2 reduced to 60% with a PEEP of 8. Sat 91%    INTERVAL HPI/OVERNIGHT EVENTS:      VITAL SIGNS:  T(F): 97.2 (04-01-21 @ 08:00)  HR: 64 (04-01-21 @ 08:15)  BP: 103/49 (04-01-21 @ 08:00)  RR: 24 (04-01-21 @ 08:00)  SpO2: 97% (04-01-21 @ 08:15)  Wt(kg): --  I&O's Detail    31 Mar 2021 07:01  -  01 Apr 2021 07:00  --------------------------------------------------------  IN:    FentaNYL: 203 mL    FentaNYL: 471.5 mL    Jevity 1.5: 840 mL    Propofol: 670.9 mL    sodium chloride 0.9%: 150 mL  Total IN: 2335.4 mL    OUT:    Indwelling Catheter - Urethral (mL): 1035 mL  Total OUT: 1035 mL    Total NET: 1300.4 mL      01 Apr 2021 07:01  -  01 Apr 2021 09:26  --------------------------------------------------------  IN:    FentaNYL: 71 mL    Jevity 1.5: 80 mL    Propofol: 55.2 mL    sodium chloride 0.9%: 150 mL  Total IN: 356.2 mL    OUT:    Indwelling Catheter - Urethral (mL): 75 mL  Total OUT: 75 mL    Total NET: 281.2 mL        Mode: AC/ CMV (Assist Control/ Continuous Mandatory Ventilation)  RR (machine): 24  TV (machine): 500  FiO2: 50  PEEP: 8  ITime: 1  MAP: 10  PIP: 27        REVIEW OF SYSTEMS:    CONSTITUTIONAL:  No fevers, chills, sweats    HEENT:  Eyes:  No diplopia or blurred vision. ENT:  No earache, sore throat or runny nose.    CARDIOVASCULAR:  No pressure, squeezing, tightness, or heaviness about the chest; no palpitations.    RESPIRATORY:  Per HPI    GASTROINTESTINAL:  No abdominal pain, nausea, vomiting or diarrhea.    GENITOURINARY:  No dysuria, frequency or urgency.    NEUROLOGIC:  No paresthesias, fasciculations, seizures or weakness.    PSYCHIATRIC:  No disorder of thought or mood.      PHYSICAL EXAM:    Constitutional: Well developed and nourished  Eyes:Perrla  ENMT: normal  Neck:supple  Respiratory: good air entry  Cardiovascular: S1 S2 regular  Gastrointestinal: Soft, Non tender  Extremities: No edema  Vascular:normal  Neurological:Awake, alert,Ox3  Musculoskeletal:Normal      MEDICATIONS  (STANDING):  ALBUTerol    90 MICROgram(s) HFA Inhaler 2 Puff(s) Inhalation every 6 hours  ascorbic acid 1000 milliGRAM(s) Oral daily  aspirin  chewable 81 milliGRAM(s) Oral daily  chlorhexidine 0.12% Liquid 15 milliLiter(s) Oral Mucosa two times a day  chlorhexidine 2% Cloths 1 Application(s) Topical <User Schedule>  enoxaparin Injectable 40 milliGRAM(s) SubCutaneous daily  fentaNYL   Infusion. 4 MICROgram(s)/kG/Hr (33.5 mL/Hr) IV Continuous <Continuous>  insulin lispro (ADMELOG) corrective regimen sliding scale   SubCutaneous every 6 hours  methylPREDNISolone sodium succinate Injectable 40 milliGRAM(s) IV Push daily  pantoprazole   Suspension 40 milliGRAM(s) Oral daily  propofol Infusion 30.007 MICROgram(s)/kG/Min (15.1 mL/Hr) IV Continuous <Continuous>  sodium chloride 0.9%. 1000 milliLiter(s) (75 mL/Hr) IV Continuous <Continuous>    MEDICATIONS  (PRN):  acetaminophen   Tablet .. 650 milliGRAM(s) Oral every 6 hours PRN Temp greater or equal to 38C (100.4F)  sodium chloride 0.9% lock flush 10 milliLiter(s) IV Push every 1 hour PRN Pre/post blood products, medications, blood draw, and to maintain line patency      Allergies    No Known Allergies    Intolerances        LABS:                        11.4   12.46 )-----------( 176      ( 01 Apr 2021 05:00 )             34.4     04-01    141  |  105  |  29<H>  ----------------------------<  119<H>  3.7   |  28  |  0.58    Ca    7.4<L>      01 Apr 2021 05:00  Phos  3.0     04-01  Mg     2.2     04-01    TPro  4.8<L>  /  Alb  1.6<L>  /  TBili  0.5  /  DBili  x   /  AST  47<H>  /  ALT  119<H>  /  AlkPhos  73  04-01        ABG - ( 31 Mar 2021 04:29 )  pH, Arterial: 7.42  pH, Blood: x     /  pCO2: 40    /  pO2: 99    / HCO3: 25    / Base Excess: 0.9   /  SaO2: 97                    CAPILLARY BLOOD GLUCOSE      POCT Blood Glucose.: 115 mg/dL (01 Apr 2021 04:56)  POCT Blood Glucose.: 135 mg/dL (31 Mar 2021 23:06)  POCT Blood Glucose.: 166 mg/dL (31 Mar 2021 16:58)  POCT Blood Glucose.: 119 mg/dL (31 Mar 2021 11:35)    pro-bnp -- 03-31 @ 04:14     d-dimer 386  03-31 @ 04:14  pro-bnp -- 03-28 @ 03:54     d-dimer 224  03-28 @ 03:54      RADIOLOGY & ADDITIONAL TESTS:    CXR:  < from: Xray Chest 1 View- PORTABLE-Urgent (Xray Chest 1 View- PORTABLE-Urgent .) (03.31.21 @ 06:38) >  IMPRESSION:    Persistent retrocardiac infiltrate.    Tubes and catheters in satisfactory position.      < end of copied text >    Ct scan chest:    ekg;    echo:

## 2021-04-01 NOTE — PROGRESS NOTE ADULT - PROBLEM SELECTOR PLAN 6
Utilized  ID#[id number] - Left message for patient's son/Mike who resides in [facility]. Requested return call to unit to further discuss status/identify surrogate provided phone number. Pending return call with patient's son, patient's brother/Niko ([phone]) has agreed to act as surrogate. Utilized  ID#[id number] - Left message for patient's son/Mike who resides in [facility] ([phone]). Requested return call to unit to further discuss status/identify surrogate provided phone number. Pending return call with patient's son, patient's brother/Niko ([phone]) has agreed to act as surrogate.

## 2021-04-01 NOTE — PROGRESS NOTE ADULT - PROBLEM SELECTOR PLAN 1
2/2 covid-19 PNA; comorbid L tiny apical pneumothorax. 3/31 CXR indicated Persistent retrocardiac infiltrate; improvement in subcutaneous emphysema. WBC 12.46. Patient sedated/intubated (ETT 3/29); continues solu-medrol and albuterol. Guarded prognosis. Full code.

## 2021-04-02 LAB
ALBUMIN SERPL ELPH-MCNC: 1.8 G/DL — LOW (ref 3.5–5)
ALP SERPL-CCNC: 94 U/L — SIGNIFICANT CHANGE UP (ref 40–120)
ALT FLD-CCNC: 108 U/L DA — HIGH (ref 10–60)
ANION GAP SERPL CALC-SCNC: 6 MMOL/L — SIGNIFICANT CHANGE UP (ref 5–17)
AST SERPL-CCNC: 40 U/L — SIGNIFICANT CHANGE UP (ref 10–40)
BASE EXCESS BLDA CALC-SCNC: 0 MMOL/L — SIGNIFICANT CHANGE UP (ref -2–2)
BILIRUB SERPL-MCNC: 0.7 MG/DL — SIGNIFICANT CHANGE UP (ref 0.2–1.2)
BLOOD GAS COMMENTS ARTERIAL: SIGNIFICANT CHANGE UP
BUN SERPL-MCNC: 20 MG/DL — HIGH (ref 7–18)
CALCIUM SERPL-MCNC: 7.6 MG/DL — LOW (ref 8.4–10.5)
CHLORIDE SERPL-SCNC: 106 MMOL/L — SIGNIFICANT CHANGE UP (ref 96–108)
CO2 SERPL-SCNC: 28 MMOL/L — SIGNIFICANT CHANGE UP (ref 22–31)
CREAT SERPL-MCNC: 0.57 MG/DL — SIGNIFICANT CHANGE UP (ref 0.5–1.3)
CRP SERPL-MCNC: 89 MG/L — HIGH
FERRITIN SERPL-MCNC: 1327 NG/ML — HIGH (ref 30–400)
GLUCOSE BLDC GLUCOMTR-MCNC: 115 MG/DL — HIGH (ref 70–99)
GLUCOSE BLDC GLUCOMTR-MCNC: 146 MG/DL — HIGH (ref 70–99)
GLUCOSE BLDC GLUCOMTR-MCNC: 91 MG/DL — SIGNIFICANT CHANGE UP (ref 70–99)
GLUCOSE BLDC GLUCOMTR-MCNC: 93 MG/DL — SIGNIFICANT CHANGE UP (ref 70–99)
GLUCOSE SERPL-MCNC: 93 MG/DL — SIGNIFICANT CHANGE UP (ref 70–99)
HCO3 BLDA-SCNC: 25 MMOL/L — SIGNIFICANT CHANGE UP (ref 23–27)
HCT VFR BLD CALC: 37.3 % — LOW (ref 39–50)
HGB BLD-MCNC: 12.1 G/DL — LOW (ref 13–17)
HOROWITZ INDEX BLDA+IHG-RTO: 45 — SIGNIFICANT CHANGE UP
MAGNESIUM SERPL-MCNC: 2 MG/DL — SIGNIFICANT CHANGE UP (ref 1.6–2.6)
MCHC RBC-ENTMCNC: 31.9 PG — SIGNIFICANT CHANGE UP (ref 27–34)
MCHC RBC-ENTMCNC: 32.4 GM/DL — SIGNIFICANT CHANGE UP (ref 32–36)
MCV RBC AUTO: 98.4 FL — SIGNIFICANT CHANGE UP (ref 80–100)
NRBC # BLD: 0 /100 WBCS — SIGNIFICANT CHANGE UP (ref 0–0)
PCO2 BLDA: 47 MMHG — HIGH (ref 32–46)
PH BLDA: 7.36 — SIGNIFICANT CHANGE UP (ref 7.35–7.45)
PHOSPHATE SERPL-MCNC: 1.9 MG/DL — LOW (ref 2.5–4.5)
PLATELET # BLD AUTO: 155 K/UL — SIGNIFICANT CHANGE UP (ref 150–400)
PO2 BLDA: 61 MMHG — LOW (ref 74–108)
POTASSIUM SERPL-MCNC: 3.9 MMOL/L — SIGNIFICANT CHANGE UP (ref 3.5–5.3)
POTASSIUM SERPL-SCNC: 3.9 MMOL/L — SIGNIFICANT CHANGE UP (ref 3.5–5.3)
PROCALCITONIN SERPL-MCNC: 0.23 NG/ML — HIGH (ref 0.02–0.1)
PROT SERPL-MCNC: 5.2 G/DL — LOW (ref 6–8.3)
RBC # BLD: 3.79 M/UL — LOW (ref 4.2–5.8)
RBC # FLD: 12.9 % — SIGNIFICANT CHANGE UP (ref 10.3–14.5)
SAO2 % BLDA: 91 % — LOW (ref 92–96)
SODIUM SERPL-SCNC: 140 MMOL/L — SIGNIFICANT CHANGE UP (ref 135–145)
TRIGL SERPL-MCNC: 155 MG/DL — HIGH
WBC # BLD: 9.37 K/UL — SIGNIFICANT CHANGE UP (ref 3.8–10.5)
WBC # FLD AUTO: 9.37 K/UL — SIGNIFICANT CHANGE UP (ref 3.8–10.5)

## 2021-04-02 PROCEDURE — 71045 X-RAY EXAM CHEST 1 VIEW: CPT | Mod: 26

## 2021-04-02 RX ORDER — SENNA PLUS 8.6 MG/1
2 TABLET ORAL AT BEDTIME
Refills: 0 | Status: DISCONTINUED | OUTPATIENT
Start: 2021-04-02 | End: 2021-04-23

## 2021-04-02 RX ORDER — MULTIVIT WITH MIN/MFOLATE/K2 340-15/3 G
1 POWDER (GRAM) ORAL ONCE
Refills: 0 | Status: COMPLETED | OUTPATIENT
Start: 2021-04-02 | End: 2021-04-02

## 2021-04-02 RX ORDER — PHENYLEPHRINE HYDROCHLORIDE 10 MG/ML
0.1 INJECTION INTRAVENOUS
Qty: 160 | Refills: 0 | Status: DISCONTINUED | OUTPATIENT
Start: 2021-04-02 | End: 2021-04-03

## 2021-04-02 RX ORDER — LACTULOSE 10 G/15ML
20 SOLUTION ORAL ONCE
Refills: 0 | Status: COMPLETED | OUTPATIENT
Start: 2021-04-02 | End: 2021-04-02

## 2021-04-02 RX ORDER — POLYETHYLENE GLYCOL 3350 17 G/17G
17 POWDER, FOR SOLUTION ORAL DAILY
Refills: 0 | Status: DISCONTINUED | OUTPATIENT
Start: 2021-04-02 | End: 2021-04-23

## 2021-04-02 RX ORDER — POTASSIUM PHOSPHATE, MONOBASIC POTASSIUM PHOSPHATE, DIBASIC 236; 224 MG/ML; MG/ML
30 INJECTION, SOLUTION INTRAVENOUS ONCE
Refills: 0 | Status: COMPLETED | OUTPATIENT
Start: 2021-04-02 | End: 2021-04-02

## 2021-04-02 RX ADMIN — CHLORHEXIDINE GLUCONATE 15 MILLILITER(S): 213 SOLUTION TOPICAL at 05:09

## 2021-04-02 RX ADMIN — PROPOFOL 15.1 MICROGRAM(S)/KG/MIN: 10 INJECTION, EMULSION INTRAVENOUS at 12:00

## 2021-04-02 RX ADMIN — Medication 650 MILLIGRAM(S): at 23:37

## 2021-04-02 RX ADMIN — SENNA PLUS 2 TABLET(S): 8.6 TABLET ORAL at 21:21

## 2021-04-02 RX ADMIN — Medication 1000 MILLIGRAM(S): at 11:09

## 2021-04-02 RX ADMIN — ENOXAPARIN SODIUM 40 MILLIGRAM(S): 100 INJECTION SUBCUTANEOUS at 11:09

## 2021-04-02 RX ADMIN — Medication 1 BOTTLE: at 16:25

## 2021-04-02 RX ADMIN — LACTULOSE 20 GRAM(S): 10 SOLUTION ORAL at 14:41

## 2021-04-02 RX ADMIN — ALBUTEROL 2 PUFF(S): 90 AEROSOL, METERED ORAL at 21:38

## 2021-04-02 RX ADMIN — PROPOFOL 15.1 MICROGRAM(S)/KG/MIN: 10 INJECTION, EMULSION INTRAVENOUS at 23:37

## 2021-04-02 RX ADMIN — ALBUTEROL 2 PUFF(S): 90 AEROSOL, METERED ORAL at 03:08

## 2021-04-02 RX ADMIN — ALBUTEROL 2 PUFF(S): 90 AEROSOL, METERED ORAL at 10:02

## 2021-04-02 RX ADMIN — FENTANYL CITRATE 33.5 MICROGRAM(S)/KG/HR: 50 INJECTION INTRAVENOUS at 20:01

## 2021-04-02 RX ADMIN — Medication 40 MILLIGRAM(S): at 05:09

## 2021-04-02 RX ADMIN — ALBUTEROL 2 PUFF(S): 90 AEROSOL, METERED ORAL at 16:20

## 2021-04-02 RX ADMIN — Medication 650 MILLIGRAM(S): at 04:23

## 2021-04-02 RX ADMIN — Medication 650 MILLIGRAM(S): at 05:09

## 2021-04-02 RX ADMIN — PROPOFOL 15.1 MICROGRAM(S)/KG/MIN: 10 INJECTION, EMULSION INTRAVENOUS at 15:25

## 2021-04-02 RX ADMIN — PROPOFOL 15.1 MICROGRAM(S)/KG/MIN: 10 INJECTION, EMULSION INTRAVENOUS at 19:09

## 2021-04-02 RX ADMIN — FENTANYL CITRATE 33.5 MICROGRAM(S)/KG/HR: 50 INJECTION INTRAVENOUS at 01:25

## 2021-04-02 RX ADMIN — MIDAZOLAM HYDROCHLORIDE 4.19 MG/KG/HR: 1 INJECTION, SOLUTION INTRAMUSCULAR; INTRAVENOUS at 00:51

## 2021-04-02 RX ADMIN — Medication 81 MILLIGRAM(S): at 11:09

## 2021-04-02 RX ADMIN — CHLORHEXIDINE GLUCONATE 1 APPLICATION(S): 213 SOLUTION TOPICAL at 05:08

## 2021-04-02 RX ADMIN — FENTANYL CITRATE 33.5 MICROGRAM(S)/KG/HR: 50 INJECTION INTRAVENOUS at 09:51

## 2021-04-02 RX ADMIN — MIDAZOLAM HYDROCHLORIDE 4.19 MG/KG/HR: 1 INJECTION, SOLUTION INTRAMUSCULAR; INTRAVENOUS at 23:36

## 2021-04-02 RX ADMIN — Medication 10 MILLIGRAM(S): at 12:22

## 2021-04-02 RX ADMIN — PROPOFOL 15.1 MICROGRAM(S)/KG/MIN: 10 INJECTION, EMULSION INTRAVENOUS at 01:29

## 2021-04-02 RX ADMIN — POTASSIUM PHOSPHATE, MONOBASIC POTASSIUM PHOSPHATE, DIBASIC 83.33 MILLIMOLE(S): 236; 224 INJECTION, SOLUTION INTRAVENOUS at 09:25

## 2021-04-02 RX ADMIN — PHENYLEPHRINE HYDROCHLORIDE 1.57 MICROGRAM(S)/KG/MIN: 10 INJECTION INTRAVENOUS at 17:19

## 2021-04-02 RX ADMIN — POLYETHYLENE GLYCOL 3350 17 GRAM(S): 17 POWDER, FOR SOLUTION ORAL at 11:11

## 2021-04-02 RX ADMIN — PANTOPRAZOLE SODIUM 40 MILLIGRAM(S): 20 TABLET, DELAYED RELEASE ORAL at 11:09

## 2021-04-02 RX ADMIN — CHLORHEXIDINE GLUCONATE 15 MILLILITER(S): 213 SOLUTION TOPICAL at 16:25

## 2021-04-02 NOTE — PROGRESS NOTE ADULT - ASSESSMENT
Assessment and Recommendation:   Problem/Recommendation - 1:  Problem: COVID-19. Recommendation: isolation precautions  cont mechanical ventilation  taper FIO2 as tolerated  Wean as tolerated  pulmonary toilet  NGT nutrition  ICU management.   Monitor oxygen sat  Monitor LFT, LDH, CRP, D-Dimer, Ferritin and procalcitonin  Vit C, D and zinc supp  Montelukast 10 mgs po Qhs  IV steroids.  Daily CXR   DVT and GI PPX     Problem/Recommendation - 2:  ·  Problem: UTI (urinary tract infection).  Recommendation: F.U cultures   Off Antibiotics.     Problem/Recommendation - 3:  ·  Problem: Chest pain.  Recommendation: likely related to Covid-19 infection.     Problem/Recommendation - 4:  ·  Problem: Transaminitis.  Recommendation: monitor LFTs  GI F/U     Problem/Recommendation - 5:  ·  Problem: Pneumothorax.  Recommendation: New trace right apical pneumothorax. New mild left apical pneumothorax.  Thoracic sx eval noted.  Continue to f/u CXR   Management per ICU

## 2021-04-02 NOTE — PROGRESS NOTE ADULT - SUBJECTIVE AND OBJECTIVE BOX
INTERVAL HPI/OVERNIGHT EVENTS: ***    PRESSORS: [ ] YES [ ] NO  WHICH:    ANTIBIOTICS:                      Antimicrobial:    Cardiovascular:    Pulmonary:  ALBUTerol    90 MICROgram(s) HFA Inhaler 2 Puff(s) Inhalation every 6 hours    Hematalogic:  aspirin  chewable 81 milliGRAM(s) Oral daily  enoxaparin Injectable 40 milliGRAM(s) SubCutaneous daily    Other:  acetaminophen   Tablet .. 650 milliGRAM(s) Oral every 6 hours PRN  ascorbic acid 1000 milliGRAM(s) Oral daily  chlorhexidine 0.12% Liquid 15 milliLiter(s) Oral Mucosa two times a day  chlorhexidine 2% Cloths 1 Application(s) Topical <User Schedule>  fentaNYL   Infusion. 4 MICROgram(s)/kG/Hr IV Continuous <Continuous>  insulin lispro (ADMELOG) corrective regimen sliding scale   SubCutaneous every 6 hours  methylPREDNISolone sodium succinate Injectable 40 milliGRAM(s) IV Push daily  midazolam Infusion 0.05 mG/kG/Hr IV Continuous <Continuous>  pantoprazole   Suspension 40 milliGRAM(s) Oral daily  polyethylene glycol 3350 17 Gram(s) Oral daily  potassium phosphate IVPB 30 milliMole(s) IV Intermittent once  propofol Infusion 30.007 MICROgram(s)/kG/Min IV Continuous <Continuous>  sodium chloride 0.9% lock flush 10 milliLiter(s) IV Push every 1 hour PRN  sodium chloride 0.9%. 1000 milliLiter(s) IV Continuous <Continuous>    acetaminophen   Tablet .. 650 milliGRAM(s) Oral every 6 hours PRN  ALBUTerol    90 MICROgram(s) HFA Inhaler 2 Puff(s) Inhalation every 6 hours  ascorbic acid 1000 milliGRAM(s) Oral daily  aspirin  chewable 81 milliGRAM(s) Oral daily  chlorhexidine 0.12% Liquid 15 milliLiter(s) Oral Mucosa two times a day  chlorhexidine 2% Cloths 1 Application(s) Topical <User Schedule>  enoxaparin Injectable 40 milliGRAM(s) SubCutaneous daily  fentaNYL   Infusion. 4 MICROgram(s)/kG/Hr IV Continuous <Continuous>  insulin lispro (ADMELOG) corrective regimen sliding scale   SubCutaneous every 6 hours  methylPREDNISolone sodium succinate Injectable 40 milliGRAM(s) IV Push daily  midazolam Infusion 0.05 mG/kG/Hr IV Continuous <Continuous>  pantoprazole   Suspension 40 milliGRAM(s) Oral daily  polyethylene glycol 3350 17 Gram(s) Oral daily  potassium phosphate IVPB 30 milliMole(s) IV Intermittent once  propofol Infusion 30.007 MICROgram(s)/kG/Min IV Continuous <Continuous>  sodium chloride 0.9% lock flush 10 milliLiter(s) IV Push every 1 hour PRN  sodium chloride 0.9%. 1000 milliLiter(s) IV Continuous <Continuous>    Drug Dosing Weight  Height (cm): 167.6 (14 Mar 2021 12:03)  Weight (kg): 83.869 (14 Mar 2021 12:03)  BMI (kg/m2): 29.9 (14 Mar 2021 12:03)  BSA (m2): 1.93 (14 Mar 2021 12:03)    CENTRAL LINE: [ ] YES [ ] NO  LOCATION:   DATE INSERTED:  REMOVE: [ ] YES [ ] NO  EXPLAIN:    RIVERA: [ ] YES [ ] NO    DATE INSERTED:  REMOVE:  [ ] YES [ ] NO  EXPLAIN:    A-LINE:  [ ] YES [ ] NO  LOCATION:   DATE INSERTED:  REMOVE:  [ ] YES [ ] NO  EXPLAIN:    PMH -reviewed admission note, no change since admission    ICU Vital Signs Last 24 Hrs  T(C): 38.2 (02 Apr 2021 04:00), Max: 38.2 (02 Apr 2021 04:00)  T(F): 100.7 (02 Apr 2021 04:00), Max: 100.7 (02 Apr 2021 04:00)  HR: 110 (02 Apr 2021 08:00) (62 - 126)  BP: 103/58 (02 Apr 2021 08:00) (102/49 - 192/170)  BP(mean): 69 (02 Apr 2021 08:00) (61 - 176)  ABP: 98/58 (02 Apr 2021 08:00) (87/50 - 166/80)  ABP(mean): 72 (02 Apr 2021 08:00) (65 - 105)  RR: 24 (02 Apr 2021 08:00) (12 - 25)  SpO2: 100% (02 Apr 2021 08:00) (86% - 100%)            04-01 @ 07:01  -  04-02 @ 07:00  --------------------------------------------------------  IN: 4092.1 mL / OUT: 1050 mL / NET: 3042.1 mL        Mode: AC/ CMV (Assist Control/ Continuous Mandatory Ventilation)  RR (machine): 24  TV (machine): 500  FiO2: 50  PEEP: 8  ITime: 0.8  MAP: 17  PIP: 41      PHYSICAL EXAM:    GENERAL: NAD, well-groomed, well-developed  HEAD:  Atraumatic, Normocephalic  EYES: EOMI, PERRLA, conjunctiva and sclera clear  ENMT: No tonsillar erythema, exudates, or enlargement; Moist mucous membranes, Good dentition, No lesions  NECK: Supple, normal appearance, No JVD; Normal thyroid; Trachea midline  NERVOUS SYSTEM:  Alert & Oriented X3, Good concentration; Motor Strength 5/5 B/L upper and lower extremities; DTRs 2+ intact and symmetric  CHEST/LUNG: No chest deformity; Normal percussion bilaterally; No rales, rhonchi, wheezing   HEART: Regular rate and rhythm; No murmurs, rubs, or gallops  ABDOMEN: Soft, Nontender, Nondistended; Bowel sounds present  EXTREMITIES:  2+ Peripheral Pulses, No clubbing, cyanosis, or edema  LYMPH: No lymphadenopathy noted  SKIN: No rashes or lesions; Good capillary refill      LABS:  CBC Full  -  ( 02 Apr 2021 03:48 )  WBC Count : 9.37 K/uL  RBC Count : 3.79 M/uL  Hemoglobin : 12.1 g/dL  Hematocrit : 37.3 %  Platelet Count - Automated : 155 K/uL  Mean Cell Volume : 98.4 fl  Mean Cell Hemoglobin : 31.9 pg  Mean Cell Hemoglobin Concentration : 32.4 gm/dL  Auto Neutrophil # : x  Auto Lymphocyte # : x  Auto Monocyte # : x  Auto Eosinophil # : x  Auto Basophil # : x  Auto Neutrophil % : x  Auto Lymphocyte % : x  Auto Monocyte % : x  Auto Eosinophil % : x  Auto Basophil % : x    04-02    140  |  106  |  20<H>  ----------------------------<  93  3.9   |  28  |  0.57    Ca    7.6<L>      02 Apr 2021 03:48  Phos  1.9     04-02  Mg     2.0     04-02    TPro  5.2<L>  /  Alb  1.8<L>  /  TBili  0.7  /  DBili  x   /  AST  40  /  ALT  108<H>  /  AlkPhos  94  04-02            RADIOLOGY & ADDITIONAL STUDIES REVIEWED:  ***    GOALS OF CARE DISCUSSION WITH PATIENT/FAMILY/PROXY:    CRITICAL CARE TIME SPENT: 35 minutes INTERVAL HPI/OVERNIGHT EVENTS: No acute event overnight.       Pulmonary:  ALBUTerol    90 MICROgram(s) HFA Inhaler 2 Puff(s) Inhalation every 6 hours    Hematalogic:  aspirin  chewable 81 milliGRAM(s) Oral daily  enoxaparin Injectable 40 milliGRAM(s) SubCutaneous daily    Other:  acetaminophen   Tablet .. 650 milliGRAM(s) Oral every 6 hours PRN  ascorbic acid 1000 milliGRAM(s) Oral daily  chlorhexidine 0.12% Liquid 15 milliLiter(s) Oral Mucosa two times a day  chlorhexidine 2% Cloths 1 Application(s) Topical <User Schedule>  fentaNYL   Infusion. 4 MICROgram(s)/kG/Hr IV Continuous <Continuous>  insulin lispro (ADMELOG) corrective regimen sliding scale   SubCutaneous every 6 hours  methylPREDNISolone sodium succinate Injectable 40 milliGRAM(s) IV Push daily  midazolam Infusion 0.05 mG/kG/Hr IV Continuous <Continuous>  pantoprazole   Suspension 40 milliGRAM(s) Oral daily  polyethylene glycol 3350 17 Gram(s) Oral daily  potassium phosphate IVPB 30 milliMole(s) IV Intermittent once  propofol Infusion 30.007 MICROgram(s)/kG/Min IV Continuous <Continuous>  sodium chloride 0.9% lock flush 10 milliLiter(s) IV Push every 1 hour PRN  sodium chloride 0.9%. 1000 milliLiter(s) IV Continuous <Continuous>    acetaminophen   Tablet .. 650 milliGRAM(s) Oral every 6 hours PRN  ALBUTerol    90 MICROgram(s) HFA Inhaler 2 Puff(s) Inhalation every 6 hours  ascorbic acid 1000 milliGRAM(s) Oral daily  aspirin  chewable 81 milliGRAM(s) Oral daily  chlorhexidine 0.12% Liquid 15 milliLiter(s) Oral Mucosa two times a day  chlorhexidine 2% Cloths 1 Application(s) Topical <User Schedule>  enoxaparin Injectable 40 milliGRAM(s) SubCutaneous daily  fentaNYL   Infusion. 4 MICROgram(s)/kG/Hr IV Continuous <Continuous>  insulin lispro (ADMELOG) corrective regimen sliding scale   SubCutaneous every 6 hours  methylPREDNISolone sodium succinate Injectable 40 milliGRAM(s) IV Push daily  midazolam Infusion 0.05 mG/kG/Hr IV Continuous <Continuous>  pantoprazole   Suspension 40 milliGRAM(s) Oral daily  polyethylene glycol 3350 17 Gram(s) Oral daily  potassium phosphate IVPB 30 milliMole(s) IV Intermittent once  propofol Infusion 30.007 MICROgram(s)/kG/Min IV Continuous <Continuous>  sodium chloride 0.9% lock flush 10 milliLiter(s) IV Push every 1 hour PRN  sodium chloride 0.9%. 1000 milliLiter(s) IV Continuous <Continuous>    Drug Dosing Weight  Height (cm): 167.6 (14 Mar 2021 12:03)  Weight (kg): 83.869 (14 Mar 2021 12:03)  BMI (kg/m2): 29.9 (14 Mar 2021 12:03)  BSA (m2): 1.93 (14 Mar 2021 12:03)    CENTRAL LINE: [x ] YES [ ] NO  LOCATION: [facility]   DATE INSERTED: 3/29  REMOVE: [ ] YES [ ] NO  EXPLAIN:    RIVERA: [x ] YES [ ] NO    DATE INSERTED:  REMOVE:  [ ] YES [ ] NO  EXPLAIN:    A-LINE:  [x ] YES [ ] NO  LOCATION: a  DATE INSERTED: 3/29  REMOVE:  [ ] YES [ ] NO  EXPLAIN:    PMH -reviewed admission note, no change since admission    ICU Vital Signs Last 24 Hrs  T(C): 38.2 (02 Apr 2021 04:00), Max: 38.2 (02 Apr 2021 04:00)  T(F): 100.7 (02 Apr 2021 04:00), Max: 100.7 (02 Apr 2021 04:00)  HR: 110 (02 Apr 2021 08:00) (62 - 126)  BP: 103/58 (02 Apr 2021 08:00) (102/49 - 192/170)  BP(mean): 69 (02 Apr 2021 08:00) (61 - 176)  ABP: 98/58 (02 Apr 2021 08:00) (87/50 - 166/80)  ABP(mean): 72 (02 Apr 2021 08:00) (65 - 105)  RR: 24 (02 Apr 2021 08:00) (12 - 25)  SpO2: 100% (02 Apr 2021 08:00) (86% - 100%)            04-01 @ 07:01  -  04-02 @ 07:00  --------------------------------------------------------  IN: 4092.1 mL / OUT: 1050 mL / NET: 3042.1 mL        Mode: AC/ CMV (Assist Control/ Continuous Mandatory Ventilation)  RR (machine): 24  TV (machine): 500  FiO2: 50  PEEP: 8  ITime: 0.8  MAP: 17  PIP: 41      PHYSICAL EXAM:  GENERAL: sedated and intubated  HEAD:  Atraumatic, Normocephalic  EYES: EOMI, PERRLA, conjunctiva and sclera clear  ENMT: No tonsillar erythema, exudates, or enlargement; blood at nostrils  NECK: Supple, normal appearance,   NERVOUS SYSTEM: sedated and intubated  CHEST/LUNG: Pt has breath sounds on both sides   HEART: s1,s2  No murmurs, rubs, or gallops  ABDOMEN: Soft, Nontender, Nondistended;  EXTREMITIES:  2+ Peripheral Pulses, No clubbing, cyanosis, or edema  LYMPH: No lymphadenopathy noted  SKIN: No rashes or lesions; Good capillary refill        LABS:  CBC Full  -  ( 02 Apr 2021 03:48 )  WBC Count : 9.37 K/uL  RBC Count : 3.79 M/uL  Hemoglobin : 12.1 g/dL  Hematocrit : 37.3 %  Platelet Count - Automated : 155 K/uL  Mean Cell Volume : 98.4 fl  Mean Cell Hemoglobin : 31.9 pg  Mean Cell Hemoglobin Concentration : 32.4 gm/dL  Auto Neutrophil # : x  Auto Lymphocyte # : x  Auto Monocyte # : x  Auto Eosinophil # : x  Auto Basophil # : x  Auto Neutrophil % : x  Auto Lymphocyte % : x  Auto Monocyte % : x  Auto Eosinophil % : x  Auto Basophil % : x    04-02    140  |  106  |  20<H>  ----------------------------<  93  3.9   |  28  |  0.57    Ca    7.6<L>      02 Apr 2021 03:48  Phos  1.9     04-02  Mg     2.0     04-02    TPro  5.2<L>  /  Alb  1.8<L>  /  TBili  0.7  /  DBili  x   /  AST  40  /  ALT  108<H>  /  AlkPhos  94  04-02        CRITICAL CARE TIME SPENT: 35 minutes

## 2021-04-02 NOTE — PROGRESS NOTE ADULT - SUBJECTIVE AND OBJECTIVE BOX
`Patient is a 55y old  Male who presents with a chief complaint of SOB (01 Apr 2021 12:09)    pt seen in icu [ x ], reg med floor [   ], bed [ x ], chair at bedside [   ], a+o x3 [  ], sedated [x  ],  nad [x  ]    andrea [ x ], ngt [x  ], et tube [ x ], cent line [x  ],          Allergies    No Known Allergies        Vitals    T(F): 100.7 (04-02-21 @ 04:00), Max: 100.7 (04-02-21 @ 04:00)  HR: 118 (04-02-21 @ 06:00) (62 - 126)  BP: 192/170 (04-02-21 @ 04:00) (102/49 - 192/170)  RR: 24 (04-02-21 @ 06:00) (12 - 24)  SpO2: 100% (04-02-21 @ 06:00) (86% - 100%)  Wt(kg): --  CAPILLARY BLOOD GLUCOSE      POCT Blood Glucose.: 93 mg/dL (02 Apr 2021 05:14)      Labs                          12.1   9.37  )-----------( 155      ( 02 Apr 2021 03:48 )             37.3       04-02    140  |  106  |  20<H>  ----------------------------<  93  3.9   |  28  |  0.57    Ca    7.6<L>      02 Apr 2021 03:48  Phos  1.9     04-02  Mg     2.0     04-02    TPro  5.2<L>  /  Alb  1.8<L>  /  TBili  0.7  /  DBili  x   /  AST  40  /  ALT  108<H>  /  AlkPhos  94  04-02            .Urine Clean Catch (Midstream)  03-15 @ 00:52   No growth  --  --          Radiology Results      Meds    MEDICATIONS  (STANDING):  ALBUTerol    90 MICROgram(s) HFA Inhaler 2 Puff(s) Inhalation every 6 hours  ascorbic acid 1000 milliGRAM(s) Oral daily  aspirin  chewable 81 milliGRAM(s) Oral daily  chlorhexidine 0.12% Liquid 15 milliLiter(s) Oral Mucosa two times a day  chlorhexidine 2% Cloths 1 Application(s) Topical <User Schedule>  enoxaparin Injectable 40 milliGRAM(s) SubCutaneous daily  fentaNYL   Infusion. 4 MICROgram(s)/kG/Hr (33.5 mL/Hr) IV Continuous <Continuous>  insulin lispro (ADMELOG) corrective regimen sliding scale   SubCutaneous every 6 hours  methylPREDNISolone sodium succinate Injectable 40 milliGRAM(s) IV Push daily  midazolam Infusion 0.05 mG/kG/Hr (4.19 mL/Hr) IV Continuous <Continuous>  pantoprazole   Suspension 40 milliGRAM(s) Oral daily  propofol Infusion 30.007 MICROgram(s)/kG/Min (15.1 mL/Hr) IV Continuous <Continuous>  sodium chloride 0.9%. 1000 milliLiter(s) (75 mL/Hr) IV Continuous <Continuous>      MEDICATIONS  (PRN):  acetaminophen   Tablet .. 650 milliGRAM(s) Oral every 6 hours PRN Temp greater or equal to 38C (100.4F)  sodium chloride 0.9% lock flush 10 milliLiter(s) IV Push every 1 hour PRN Pre/post blood products, medications, blood draw, and to maintain line patency      Physical Exam      Neuro :  no focal deficits  Respiratory: CTA B/L  CV: RRR, S1S2, no murmurs,   Abdominal: Soft, NT, ND +BS,  Extremities: No edema, + peripheral pulses      ASSESSMENT    Hypoxemia 2nd to covid pna   transaminitis  prediabetes  h/o appendectomy  cholecystectomy        PLAN    contact and airborne isolation  d/c remdesevir given covid ab positive noted   completed dexamethasone   started pulse steroids for 3 days - 250mg solumedrol bid now tapered to 40mg daily  cont asa, vit c,    cont albuterol inhaler   pulm f/u  procalcitonin, D-dimer, crp, ldh, ferritin, lactate noted ,    tmx 100.7  cont tylenol prn,   cont robitussin prn   pt on fentanyl and propofol drip  s/p intubation 3/29/21  O2 sat (86% - 100%) mech vent  O2 via mech vent   xray 3/19/21 with pneumomediastinum  rept cxr with New trace right apical pneumothorax. New mild left apical pneumothorax. Grossly stable small pneumomediastinum.  Soft tissue emphysema at the neck bases bilaterally. Grossly stable bilateral pulmonary infiltrates noted.   cxr 2/24 with No evidence of pneumothorax can be appreciated on the available image. This may be related to patient positioning. Evidence of pneumomediastinum and subcutaneous emphysema in the lower neck is again noted. There are patchy bibasilar infiltrates and elevated right hemidiaphragm noted.   cxr 3/29/21 with No significant change bilateral infiltrates. There is a small simple left apical pneumothorax. No significant pleural effusion. Bilateral subcutaneous emphysema similar to prior.   thoracic surg f/u   pt intubated 6AM 3/29/21, XRs on 7AM and 5PM with no obvious increase of ptx  no immediate need for CT at this time,   recommend close monitoring with serial CXRs   lispro ss  cont current meds  cont mgmt as per icu

## 2021-04-02 NOTE — PROGRESS NOTE ADULT - ASSESSMENT
ASSESSMENT AND PLAN:  55M, no PMH, PSH appy and moody 1990s presented 3/14 with x9 days worsening cough, subjective fevers, and SOB, with x2-3 days dysuria and central, non-radiating, constant CP. Admitted to [facility] for acute hypoxic respiratory failure s/t covid pneumonia, ICU consulted for increasing work of breathing on 15 lpm NRM.     1. Acute hypoxic respiratory failure  2. Covid pneumonia  3. Transaminitis  4. Prediabetes  5. Abnormal TSH    =================== Neuro============================  Alert and oriented x 3 at baseline     intubated and sedated 3/29      ================= Cardiovascular==========================  Hypertension  not on any pressor support, maintaining map above 60    TACHYCARDIA:  pT HR in 80s   will keep monitoring         ================- Pulm=================================  Acute hypoxic respiratory failure: secondary to covid pneumonia  -was on  HFNr then got intubated 3/29  -D-dimer initially elevated on presentation, now wnl  -Remdesivir was discontinued due to positive antibodies   - add albuterol prn   -procalcitonin wnl, d-dimer in 200s  -started pulse steroids for 3 days - 250mg solumedrol bid- completed3/27  - solumedrol 40 bid now titrated down to once daily    #Pneumothorax and pneumomediastinum:  xray chest: tiny left apical pneumothorax  thoracic surgery consulted recommended no intervention at this time, just observation for now  xray monitoring daily    ==================ID===================================  Covid pneumonia  - leukocytosis 2/2 steroids  - afebrile overnight to 100.4 f  -plan as above     ================= Nephro================================  -No issues   -monitor lytes, SCr   -monitor I/Os    =================GI====================================  Transaminitis:   likely secondary to covid   - and  on presentation  -Improved  -continue to monitor,  -  hepatitis panel -ve    diet:   ng tube and tube feed    ================ Heme==================================  Elevated d-dimer: likely secondary to covid  -d-dimer 423 on presentation, now wnl  -continue prophylactic Lovenox 40 mg QD    =================Endocrine===============================  Prediabetes:    -a1c  5.8  -BS controlled  -continue HSS  -monitor FS while on steroids    Abnormal TSH:  -TSH level noted 0.26,   -TSH 0.37 and   - Free T4 1.82      ================= Skin/Catheters============================  No rashes. Peripheral IV lines.   central line, a-line, ng tube present 3/29,     - =================Prophylaxis =============================  Lovenox for DVT proph  Protonix for  GI proph    ==================GOC==================================   FULL CODE    spoke to brother with [place]ian interpretor and updated about pt condition.  ASSESSMENT AND PLAN:  55M, no PMH, PSH appy and moody 1990s presented 3/14 with x9 days worsening cough, subjective fevers, and SOB, with x2-3 days dysuria and central, non-radiating, constant CP. Admitted to [facility] for acute hypoxic respiratory failure s/t covid pneumonia, ICU consulted for increasing work of breathing on 15 lpm NRM.     1. Acute hypoxic respiratory failure  2. Covid pneumonia  3. Transaminitis  4. Prediabetes  5. Abnormal TSH    =================== Neuro============================  Alert and oriented x 3 at baseline     intubated and sedated 3/29      ================= Cardiovascular==========================  Hypertension  not on any pressor support, maintaining map above 60    TACHYCARDIA:  pT HR in 80s   will keep monitoring         ================- Pulm=================================  Acute hypoxic respiratory failure: secondary to covid pneumonia  -was on  HFNr then got intubated 3/29  -D-dimer initially elevated on presentation, now wnl  -Remdesivir was discontinued due to positive antibodies   - add albuterol prn   -procalcitonin wnl, d-dimer in 200s  -started pulse steroids for 3 days - 250mg solumedrol bid- completed3/27  - solumedrol 40 once daily    #Pneumothorax and pneumomediastinum:  xray chest: tiny left apical pneumothorax  thoracic surgery consulted recommended no intervention at this time, just observation for now  xray monitoring daily    ==================ID===================================  Covid pneumonia  - leukocytosis resolving  - febrile overnight to 100.7 f  -plan as above     ================= Nephro================================  -No issues   -monitor lytes, SCr   -monitor I/Os    =================GI====================================  Transaminitis:   likely secondary to covid   - and  on presentation  -Improved  -continue to monitor,  -  hepatitis panel -ve    diet:   ng tube and tube feed  bowel regimen    ================ Heme==================================  Elevated d-dimer: likely secondary to covid  -d-dimer 423 on presentation, now wnl  -continue prophylactic Lovenox 40 mg QD    =================Endocrine===============================  Prediabetes:    -a1c  5.8  -BS controlled  -continue HSS  -monitor FS while on steroids    Abnormal TSH:  -TSH level noted 0.26,   -TSH 0.37 and   - Free T4 1.82      ================= Skin/Catheters============================  No rashes. Peripheral IV lines.   central line, a-line, ng tube present 3/29,     - =================Prophylaxis =============================  Lovenox for DVT proph  Protonix for  GI proph    ==================GOC==================================   FULL CODE    spoke to brother with [place]ian interpretor and updated about pt condition.

## 2021-04-02 NOTE — PROGRESS NOTE ADULT - SUBJECTIVE AND OBJECTIVE BOX
Patient is a 55y old  Male who presents with a chief complaint of SOB (02 Apr 2021 08:39)  Patient is sedated, intubated on mechanical ventilation. FIO2 down to 45% with a sat of 100%    INTERVAL HPI/OVERNIGHT EVENTS:      VITAL SIGNS:  T(F): 100.7 (04-02-21 @ 04:00)  HR: 97 (04-02-21 @ 10:00)  BP: 103/58 (04-02-21 @ 08:00)  RR: 15 (04-02-21 @ 10:00)  SpO2: 93% (04-02-21 @ 10:00)  Wt(kg): --  I&O's Detail    01 Apr 2021 07:01  -  02 Apr 2021 07:00  --------------------------------------------------------  IN:    FentaNYL: 804 mL    Glucerna 1.5: 255 mL    Jevity 1.5: 440 mL    Midazolam: 28.7 mL    Propofol: 839.4 mL    sodium chloride 0.9%: 1725 mL  Total IN: 4092.1 mL    OUT:    Indwelling Catheter - Urethral (mL): 1050 mL  Total OUT: 1050 mL    Total NET: 3042.1 mL      02 Apr 2021 07:01  -  02 Apr 2021 10:58  --------------------------------------------------------  IN:    FentaNYL: 92.1 mL    Glucerna 1.5: 75 mL    Midazolam: 10.7 mL    Propofol: 72.8 mL  Total IN: 250.6 mL    OUT:    Indwelling Catheter - Urethral (mL): 145 mL    sodium chloride 0.9%: 0 mL  Total OUT: 145 mL    Total NET: 105.6 mL        Mode: AC/ CMV (Assist Control/ Continuous Mandatory Ventilation)  RR (machine): 24  TV (machine): 450  FiO2: 45  PEEP: 8  ITime: 0.8  MAP: 21  PIP: 32        REVIEW OF SYSTEMS:    CONSTITUTIONAL:  No fevers, chills, sweats    HEENT:  Eyes:  No diplopia or blurred vision. ENT:  No earache, sore throat or runny nose.    CARDIOVASCULAR:  No pressure, squeezing, tightness, or heaviness about the chest; no palpitations.    RESPIRATORY:  Per HPI    GASTROINTESTINAL:  No abdominal pain, nausea, vomiting or diarrhea.    GENITOURINARY:  No dysuria, frequency or urgency.    NEUROLOGIC:  No paresthesias, fasciculations, seizures or weakness.    PSYCHIATRIC:  No disorder of thought or mood.      PHYSICAL EXAM:    Constitutional: Well developed and nourished  Eyes:Perrla  ENMT: normal  Neck:supple  Respiratory: good air entry  Cardiovascular: S1 S2 regular  Gastrointestinal: Soft, Non tender  Extremities: No edema  Vascular:normal  Neurological:Sedated  Musculoskeletal:Normal      MEDICATIONS  (STANDING):  ALBUTerol    90 MICROgram(s) HFA Inhaler 2 Puff(s) Inhalation every 6 hours  ascorbic acid 1000 milliGRAM(s) Oral daily  aspirin  chewable 81 milliGRAM(s) Oral daily  chlorhexidine 0.12% Liquid 15 milliLiter(s) Oral Mucosa two times a day  chlorhexidine 2% Cloths 1 Application(s) Topical <User Schedule>  enoxaparin Injectable 40 milliGRAM(s) SubCutaneous daily  fentaNYL   Infusion. 4 MICROgram(s)/kG/Hr (33.5 mL/Hr) IV Continuous <Continuous>  insulin lispro (ADMELOG) corrective regimen sliding scale   SubCutaneous every 6 hours  methylPREDNISolone sodium succinate Injectable 40 milliGRAM(s) IV Push daily  midazolam Infusion 0.05 mG/kG/Hr (4.19 mL/Hr) IV Continuous <Continuous>  pantoprazole   Suspension 40 milliGRAM(s) Oral daily  polyethylene glycol 3350 17 Gram(s) Oral daily  propofol Infusion 30.007 MICROgram(s)/kG/Min (15.1 mL/Hr) IV Continuous <Continuous>  sodium chloride 0.9%. 1000 milliLiter(s) (75 mL/Hr) IV Continuous <Continuous>    MEDICATIONS  (PRN):  acetaminophen   Tablet .. 650 milliGRAM(s) Oral every 6 hours PRN Temp greater or equal to 38C (100.4F)  sodium chloride 0.9% lock flush 10 milliLiter(s) IV Push every 1 hour PRN Pre/post blood products, medications, blood draw, and to maintain line patency      Allergies    No Known Allergies    Intolerances        LABS:                        12.1   9.37  )-----------( 155      ( 02 Apr 2021 03:48 )             37.3     04-02    140  |  106  |  20<H>  ----------------------------<  93  3.9   |  28  |  0.57    Ca    7.6<L>      02 Apr 2021 03:48  Phos  1.9     04-02  Mg     2.0     04-02    TPro  5.2<L>  /  Alb  1.8<L>  /  TBili  0.7  /  DBili  x   /  AST  40  /  ALT  108<H>  /  AlkPhos  94  04-02        ABG - ( 02 Apr 2021 09:17 )  pH, Arterial: 7.36  pH, Blood: x     /  pCO2: 47    /  pO2: 61    / HCO3: 25    / Base Excess: 0.0   /  SaO2: 91                    CAPILLARY BLOOD GLUCOSE      POCT Blood Glucose.: 115 mg/dL (02 Apr 2021 10:46)  POCT Blood Glucose.: 93 mg/dL (02 Apr 2021 05:14)  POCT Blood Glucose.: 133 mg/dL (01 Apr 2021 23:11)  POCT Blood Glucose.: 145 mg/dL (01 Apr 2021 17:47)  POCT Blood Glucose.: 169 mg/dL (01 Apr 2021 16:10)    pro-bnp -- 03-31 @ 04:14     d-dimer 386  03-31 @ 04:14  pro-bnp -- 03-28 @ 03:54     d-dimer 224  03-28 @ 03:54      RADIOLOGY & ADDITIONAL TESTS:    CXR:    Ct scan chest:    ekg;    echo:

## 2021-04-03 LAB
ALBUMIN SERPL ELPH-MCNC: 1.5 G/DL — LOW (ref 3.5–5)
ALP SERPL-CCNC: 101 U/L — SIGNIFICANT CHANGE UP (ref 40–120)
ALT FLD-CCNC: 82 U/L DA — HIGH (ref 10–60)
ANION GAP SERPL CALC-SCNC: 6 MMOL/L — SIGNIFICANT CHANGE UP (ref 5–17)
AST SERPL-CCNC: 40 U/L — SIGNIFICANT CHANGE UP (ref 10–40)
BASE EXCESS BLDA CALC-SCNC: -1.9 MMOL/L — SIGNIFICANT CHANGE UP (ref -2–2)
BILIRUB SERPL-MCNC: 0.7 MG/DL — SIGNIFICANT CHANGE UP (ref 0.2–1.2)
BLOOD GAS COMMENTS ARTERIAL: SIGNIFICANT CHANGE UP
BUN SERPL-MCNC: 22 MG/DL — HIGH (ref 7–18)
CALCIUM SERPL-MCNC: 8 MG/DL — LOW (ref 8.4–10.5)
CHLORIDE SERPL-SCNC: 108 MMOL/L — SIGNIFICANT CHANGE UP (ref 96–108)
CO2 SERPL-SCNC: 30 MMOL/L — SIGNIFICANT CHANGE UP (ref 22–31)
CREAT SERPL-MCNC: 0.58 MG/DL — SIGNIFICANT CHANGE UP (ref 0.5–1.3)
D DIMER BLD IA.RAPID-MCNC: 1250 NG/ML DDU — HIGH
FERRITIN SERPL-MCNC: 1521 NG/ML — HIGH (ref 30–400)
GLUCOSE BLDC GLUCOMTR-MCNC: 106 MG/DL — HIGH (ref 70–99)
GLUCOSE BLDC GLUCOMTR-MCNC: 124 MG/DL — HIGH (ref 70–99)
GLUCOSE BLDC GLUCOMTR-MCNC: 138 MG/DL — HIGH (ref 70–99)
GLUCOSE SERPL-MCNC: 112 MG/DL — HIGH (ref 70–99)
HCO3 BLDA-SCNC: 27 MMOL/L — SIGNIFICANT CHANGE UP (ref 23–27)
HCT VFR BLD CALC: 39.6 % — SIGNIFICANT CHANGE UP (ref 39–50)
HGB BLD-MCNC: 12.8 G/DL — LOW (ref 13–17)
HOROWITZ INDEX BLDA+IHG-RTO: 60 — SIGNIFICANT CHANGE UP
MAGNESIUM SERPL-MCNC: 2.8 MG/DL — HIGH (ref 1.6–2.6)
MCHC RBC-ENTMCNC: 32.3 GM/DL — SIGNIFICANT CHANGE UP (ref 32–36)
MCHC RBC-ENTMCNC: 32.5 PG — SIGNIFICANT CHANGE UP (ref 27–34)
MCV RBC AUTO: 100.5 FL — HIGH (ref 80–100)
NRBC # BLD: 0 /100 WBCS — SIGNIFICANT CHANGE UP (ref 0–0)
PCO2 BLDA: 66 MMHG — HIGH (ref 32–46)
PH BLDA: 7.23 — LOW (ref 7.35–7.45)
PHOSPHATE SERPL-MCNC: 2.5 MG/DL — SIGNIFICANT CHANGE UP (ref 2.5–4.5)
PLATELET # BLD AUTO: 152 K/UL — SIGNIFICANT CHANGE UP (ref 150–400)
PO2 BLDA: 79 MMHG — SIGNIFICANT CHANGE UP (ref 74–108)
POTASSIUM SERPL-MCNC: 4.3 MMOL/L — SIGNIFICANT CHANGE UP (ref 3.5–5.3)
POTASSIUM SERPL-SCNC: 4.3 MMOL/L — SIGNIFICANT CHANGE UP (ref 3.5–5.3)
PROCALCITONIN SERPL-MCNC: 1.39 NG/ML — HIGH (ref 0.02–0.1)
PROT SERPL-MCNC: 5.5 G/DL — LOW (ref 6–8.3)
RBC # BLD: 3.94 M/UL — LOW (ref 4.2–5.8)
RBC # FLD: 13.2 % — SIGNIFICANT CHANGE UP (ref 10.3–14.5)
SAO2 % BLDA: 94 % — SIGNIFICANT CHANGE UP (ref 92–96)
SODIUM SERPL-SCNC: 144 MMOL/L — SIGNIFICANT CHANGE UP (ref 135–145)
WBC # BLD: 5.45 K/UL — SIGNIFICANT CHANGE UP (ref 3.8–10.5)
WBC # FLD AUTO: 5.45 K/UL — SIGNIFICANT CHANGE UP (ref 3.8–10.5)

## 2021-04-03 PROCEDURE — 71045 X-RAY EXAM CHEST 1 VIEW: CPT | Mod: 26

## 2021-04-03 RX ORDER — ENOXAPARIN SODIUM 100 MG/ML
40 INJECTION SUBCUTANEOUS
Refills: 0 | Status: DISCONTINUED | OUTPATIENT
Start: 2021-04-03 | End: 2021-04-08

## 2021-04-03 RX ORDER — ACETAMINOPHEN 500 MG
650 TABLET ORAL EVERY 6 HOURS
Refills: 0 | Status: DISCONTINUED | OUTPATIENT
Start: 2021-04-03 | End: 2021-04-15

## 2021-04-03 RX ADMIN — PROPOFOL 15.1 MICROGRAM(S)/KG/MIN: 10 INJECTION, EMULSION INTRAVENOUS at 03:13

## 2021-04-03 RX ADMIN — Medication 1000 MILLIGRAM(S): at 11:35

## 2021-04-03 RX ADMIN — Medication 40 MILLIGRAM(S): at 05:15

## 2021-04-03 RX ADMIN — CHLORHEXIDINE GLUCONATE 15 MILLILITER(S): 213 SOLUTION TOPICAL at 17:50

## 2021-04-03 RX ADMIN — ALBUTEROL 2 PUFF(S): 90 AEROSOL, METERED ORAL at 16:07

## 2021-04-03 RX ADMIN — FENTANYL CITRATE 33.5 MICROGRAM(S)/KG/HR: 50 INJECTION INTRAVENOUS at 05:14

## 2021-04-03 RX ADMIN — Medication 650 MILLIGRAM(S): at 21:35

## 2021-04-03 RX ADMIN — FENTANYL CITRATE 33.5 MICROGRAM(S)/KG/HR: 50 INJECTION INTRAVENOUS at 12:01

## 2021-04-03 RX ADMIN — PROPOFOL 15.1 MICROGRAM(S)/KG/MIN: 10 INJECTION, EMULSION INTRAVENOUS at 22:25

## 2021-04-03 RX ADMIN — CHLORHEXIDINE GLUCONATE 1 APPLICATION(S): 213 SOLUTION TOPICAL at 05:15

## 2021-04-03 RX ADMIN — Medication 650 MILLIGRAM(S): at 00:30

## 2021-04-03 RX ADMIN — ALBUTEROL 2 PUFF(S): 90 AEROSOL, METERED ORAL at 20:34

## 2021-04-03 RX ADMIN — ENOXAPARIN SODIUM 40 MILLIGRAM(S): 100 INJECTION SUBCUTANEOUS at 17:50

## 2021-04-03 RX ADMIN — ALBUTEROL 2 PUFF(S): 90 AEROSOL, METERED ORAL at 09:06

## 2021-04-03 RX ADMIN — Medication 650 MILLIGRAM(S): at 22:35

## 2021-04-03 RX ADMIN — CHLORHEXIDINE GLUCONATE 15 MILLILITER(S): 213 SOLUTION TOPICAL at 05:15

## 2021-04-03 RX ADMIN — Medication 81 MILLIGRAM(S): at 11:35

## 2021-04-03 RX ADMIN — MIDAZOLAM HYDROCHLORIDE 4.19 MG/KG/HR: 1 INJECTION, SOLUTION INTRAMUSCULAR; INTRAVENOUS at 22:25

## 2021-04-03 RX ADMIN — PANTOPRAZOLE SODIUM 40 MILLIGRAM(S): 20 TABLET, DELAYED RELEASE ORAL at 11:35

## 2021-04-03 RX ADMIN — PROPOFOL 15.1 MICROGRAM(S)/KG/MIN: 10 INJECTION, EMULSION INTRAVENOUS at 06:15

## 2021-04-03 NOTE — PROGRESS NOTE ADULT - SUBJECTIVE AND OBJECTIVE BOX
Patient is a 55y old  Male who presents with a chief complaint of SOB (03 Apr 2021 00:28)    pt seen in icu [ x ], reg med floor [   ], bed [ x ], chair at bedside [   ], a+o x3 [  ], sedated [x  ],  nad [x  ]    andrea [ x ], ngt [x  ], et tube [ x ], cent line [x  ],        Allergies    No Known Allergies        Vitals    T(F): 100.7 (04-03-21 @ 03:00), Max: 100.7 (04-02-21 @ 23:00)  HR: 147 (04-03-21 @ 04:29) (90 - 147)  BP: 122/69 (04-03-21 @ 04:00) (98/56 - 143/71)  RR: 24 (04-03-21 @ 04:00) (15 - 28)  SpO2: 90% (04-03-21 @ 04:29) (87% - 100%)  Wt(kg): --  CAPILLARY BLOOD GLUCOSE      POCT Blood Glucose.: 106 mg/dL (03 Apr 2021 05:08)      Labs                          12.8   5.45  )-----------( 152      ( 03 Apr 2021 03:48 )             39.6       04-03    144  |  108  |  22<H>  ----------------------------<  112<H>  4.3   |  30  |  0.58    Ca    8.0<L>      03 Apr 2021 03:48  Phos  2.5     04-03  Mg     2.8     04-03    TPro  5.5<L>  /  Alb  1.5<L>  /  TBili  0.7  /  DBili  x   /  AST  40  /  ALT  82<H>  /  AlkPhos  101  04-03            .Urine Clean Catch (Midstream)  03-15 @ 00:52   No growth  --  --          Radiology Results      Meds    MEDICATIONS  (STANDING):  ALBUTerol    90 MICROgram(s) HFA Inhaler 2 Puff(s) Inhalation every 6 hours  ascorbic acid 1000 milliGRAM(s) Oral daily  aspirin  chewable 81 milliGRAM(s) Oral daily  bisacodyl Suppository 10 milliGRAM(s) Rectal daily  chlorhexidine 0.12% Liquid 15 milliLiter(s) Oral Mucosa two times a day  chlorhexidine 2% Cloths 1 Application(s) Topical <User Schedule>  enoxaparin Injectable 40 milliGRAM(s) SubCutaneous daily  fentaNYL   Infusion. 4 MICROgram(s)/kG/Hr (33.5 mL/Hr) IV Continuous <Continuous>  insulin lispro (ADMELOG) corrective regimen sliding scale   SubCutaneous every 6 hours  methylPREDNISolone sodium succinate Injectable 40 milliGRAM(s) IV Push daily  midazolam Infusion 0.05 mG/kG/Hr (4.19 mL/Hr) IV Continuous <Continuous>  pantoprazole   Suspension 40 milliGRAM(s) Oral daily  polyethylene glycol 3350 17 Gram(s) Oral daily  propofol Infusion 30.007 MICROgram(s)/kG/Min (15.1 mL/Hr) IV Continuous <Continuous>  senna 2 Tablet(s) Oral at bedtime  sodium chloride 0.9%. 1000 milliLiter(s) (75 mL/Hr) IV Continuous <Continuous>      MEDICATIONS  (PRN):  acetaminophen   Tablet .. 650 milliGRAM(s) Oral every 6 hours PRN Temp greater or equal to 38C (100.4F)  sodium chloride 0.9% lock flush 10 milliLiter(s) IV Push every 1 hour PRN Pre/post blood products, medications, blood draw, and to maintain line patency      Physical Exam    Neuro :  no focal deficits  Respiratory: CTA B/L  CV: RRR, S1S2, no murmurs,   Abdominal: Soft, NT, ND +BS,  Extremities: No edema, + peripheral pulses      ASSESSMENT    Hypoxemia 2nd to covid pna   transaminitis  prediabetes  h/o appendectomy  cholecystectomy        PLAN    contact and airborne isolation  d/c remdesevir given covid ab positive noted   completed dexamethasone   started pulse steroids for 3 days - 250mg solumedrol bid now tapered to 40mg daily  cont asa, vit c,    cont albuterol inhaler   pulm f/u  procalcitonin, D-dimer, crp, ldh, ferritin, lactate noted ,    tmx 100.7  cont tylenol prn,   cont robitussin prn   pt on fentanyl and propofol drip  s/p intubation 3/29/21  O2 sat (86% - 100%) mech vent  O2 via mech vent   xray 3/19/21 with pneumomediastinum  rept cxr with New trace right apical pneumothorax. New mild left apical pneumothorax. Grossly stable small pneumomediastinum.  Soft tissue emphysema at the neck bases bilaterally. Grossly stable bilateral pulmonary infiltrates noted.   cxr 2/24 with No evidence of pneumothorax can be appreciated on the available image. This may be related to patient positioning. Evidence of pneumomediastinum and subcutaneous emphysema in the lower neck is again noted. There are patchy bibasilar infiltrates and elevated right hemidiaphragm noted.   cxr 3/29/21 with No significant change bilateral infiltrates. There is a small simple left apical pneumothorax. No significant pleural effusion. Bilateral subcutaneous emphysema similar to prior.   thoracic surg f/u   pt intubated 6AM 3/29/21, XRs on 7AM and 5PM with no obvious increase of ptx  no immediate need for CT at this time,   recommend close monitoring with serial CXRs   lispro ss  cont current meds  cont mgmt as per icu     Patient is a 55y old  Male who presents with a chief complaint of SOB (03 Apr 2021 00:28)    pt seen in icu [ x ], reg med floor [   ], bed [ x ], chair at bedside [   ], a+o x3 [  ], sedated [x  ],  nad [x  ]    andrea [ x ], ngt [x  ], et tube [ x ], cent line [x  ],        Allergies    No Known Allergies        Vitals    T(F): 100.7 (04-03-21 @ 03:00), Max: 100.7 (04-02-21 @ 23:00)  HR: 147 (04-03-21 @ 04:29) (90 - 147)  BP: 122/69 (04-03-21 @ 04:00) (98/56 - 143/71)  RR: 24 (04-03-21 @ 04:00) (15 - 28)  SpO2: 90% (04-03-21 @ 04:29) (87% - 100%)  Wt(kg): --  CAPILLARY BLOOD GLUCOSE      POCT Blood Glucose.: 106 mg/dL (03 Apr 2021 05:08)      Labs                          12.8   5.45  )-----------( 152      ( 03 Apr 2021 03:48 )             39.6       04-03    144  |  108  |  22<H>  ----------------------------<  112<H>  4.3   |  30  |  0.58    Ca    8.0<L>      03 Apr 2021 03:48  Phos  2.5     04-03  Mg     2.8     04-03    TPro  5.5<L>  /  Alb  1.5<L>  /  TBili  0.7  /  DBili  x   /  AST  40  /  ALT  82<H>  /  AlkPhos  101  04-03            .Urine Clean Catch (Midstream)  03-15 @ 00:52   No growth  --  --          Radiology Results      Meds    MEDICATIONS  (STANDING):  ALBUTerol    90 MICROgram(s) HFA Inhaler 2 Puff(s) Inhalation every 6 hours  ascorbic acid 1000 milliGRAM(s) Oral daily  aspirin  chewable 81 milliGRAM(s) Oral daily  bisacodyl Suppository 10 milliGRAM(s) Rectal daily  chlorhexidine 0.12% Liquid 15 milliLiter(s) Oral Mucosa two times a day  chlorhexidine 2% Cloths 1 Application(s) Topical <User Schedule>  enoxaparin Injectable 40 milliGRAM(s) SubCutaneous daily  fentaNYL   Infusion. 4 MICROgram(s)/kG/Hr (33.5 mL/Hr) IV Continuous <Continuous>  insulin lispro (ADMELOG) corrective regimen sliding scale   SubCutaneous every 6 hours  methylPREDNISolone sodium succinate Injectable 40 milliGRAM(s) IV Push daily  midazolam Infusion 0.05 mG/kG/Hr (4.19 mL/Hr) IV Continuous <Continuous>  pantoprazole   Suspension 40 milliGRAM(s) Oral daily  polyethylene glycol 3350 17 Gram(s) Oral daily  propofol Infusion 30.007 MICROgram(s)/kG/Min (15.1 mL/Hr) IV Continuous <Continuous>  senna 2 Tablet(s) Oral at bedtime  sodium chloride 0.9%. 1000 milliLiter(s) (75 mL/Hr) IV Continuous <Continuous>      MEDICATIONS  (PRN):  acetaminophen   Tablet .. 650 milliGRAM(s) Oral every 6 hours PRN Temp greater or equal to 38C (100.4F)  sodium chloride 0.9% lock flush 10 milliLiter(s) IV Push every 1 hour PRN Pre/post blood products, medications, blood draw, and to maintain line patency      Physical Exam    Neuro :  no focal deficits  Respiratory: CTA B/L  CV: RRR, S1S2, no murmurs,   Abdominal: Soft, NT, ND +BS,  Extremities: No edema, + peripheral pulses      ASSESSMENT    Hypoxemia 2nd to covid pna   transaminitis  prediabetes  h/o appendectomy  cholecystectomy        PLAN    contact and airborne isolation  d/c remdesevir given covid ab positive noted   completed dexamethasone   started pulse steroids for 3 days - 250mg solumedrol bid now tapered to 40mg daily  cont asa, vit c,    cont albuterol inhaler   pulm f/u  procalcitonin, D-dimer, crp, ldh, ferritin, lactate noted ,    tmx 100.7  cont tylenol prn,   cont robitussin prn   pt on fentanyl, midazolam and propofol drip  s/p intubation 3/29/21  O2 sat (87% - 100%) mech vent  O2 via mech vent   xray 3/19/21 with pneumomediastinum  rept cxr with New trace right apical pneumothorax. New mild left apical pneumothorax. Grossly stable small pneumomediastinum.  Soft tissue emphysema at the neck bases bilaterally. Grossly stable bilateral pulmonary infiltrates noted.   cxr 2/24 with No evidence of pneumothorax can be appreciated on the available image. This may be related to patient positioning. Evidence of pneumomediastinum and subcutaneous emphysema in the lower neck is again noted. There are patchy bibasilar infiltrates and elevated right hemidiaphragm noted.   cxr 3/29/21 with No significant change bilateral infiltrates. There is a small simple left apical pneumothorax. No significant pleural effusion. Bilateral subcutaneous emphysema similar to prior.   thoracic surg f/u   pt intubated 6AM 3/29/21, XRs on 7AM and 5PM with no obvious increase of ptx  no immediate need for CT at this time,   recommend close monitoring with serial CXRs   lispro ss   prognosis poor  cont current meds  cont mgmt as per icu

## 2021-04-03 NOTE — PROGRESS NOTE ADULT - SUBJECTIVE AND OBJECTIVE BOX
INTERVAL HPI/OVERNIGHT EVENTS: ***    PRESSORS: [x] YES [ ] NO  WHICH: Pheny    Antimicrobial:    Cardiovascular:  phenylephrine    Infusion 0.1 MICROgram(s)/kG/Min IV Continuous <Continuous>    Pulmonary:  ALBUTerol    90 MICROgram(s) HFA Inhaler 2 Puff(s) Inhalation every 6 hours    Hematalogic:  aspirin  chewable 81 milliGRAM(s) Oral daily  enoxaparin Injectable 40 milliGRAM(s) SubCutaneous daily    Other:  acetaminophen   Tablet .. 650 milliGRAM(s) Oral every 6 hours PRN  ascorbic acid 1000 milliGRAM(s) Oral daily  bisacodyl Suppository 10 milliGRAM(s) Rectal daily  chlorhexidine 0.12% Liquid 15 milliLiter(s) Oral Mucosa two times a day  chlorhexidine 2% Cloths 1 Application(s) Topical <User Schedule>  fentaNYL   Infusion. 4 MICROgram(s)/kG/Hr IV Continuous <Continuous>  insulin lispro (ADMELOG) corrective regimen sliding scale   SubCutaneous every 6 hours  methylPREDNISolone sodium succinate Injectable 40 milliGRAM(s) IV Push daily  midazolam Infusion 0.05 mG/kG/Hr IV Continuous <Continuous>  pantoprazole   Suspension 40 milliGRAM(s) Oral daily  polyethylene glycol 3350 17 Gram(s) Oral daily  propofol Infusion 30.007 MICROgram(s)/kG/Min IV Continuous <Continuous>  senna 2 Tablet(s) Oral at bedtime  sodium chloride 0.9% lock flush 10 milliLiter(s) IV Push every 1 hour PRN  sodium chloride 0.9%. 1000 milliLiter(s) IV Continuous <Continuous>    acetaminophen   Tablet .. 650 milliGRAM(s) Oral every 6 hours PRN  ALBUTerol    90 MICROgram(s) HFA Inhaler 2 Puff(s) Inhalation every 6 hours  ascorbic acid 1000 milliGRAM(s) Oral daily  aspirin  chewable 81 milliGRAM(s) Oral daily  bisacodyl Suppository 10 milliGRAM(s) Rectal daily  chlorhexidine 0.12% Liquid 15 milliLiter(s) Oral Mucosa two times a day  chlorhexidine 2% Cloths 1 Application(s) Topical <User Schedule>  enoxaparin Injectable 40 milliGRAM(s) SubCutaneous daily  fentaNYL   Infusion. 4 MICROgram(s)/kG/Hr IV Continuous <Continuous>  insulin lispro (ADMELOG) corrective regimen sliding scale   SubCutaneous every 6 hours  methylPREDNISolone sodium succinate Injectable 40 milliGRAM(s) IV Push daily  midazolam Infusion 0.05 mG/kG/Hr IV Continuous <Continuous>  pantoprazole   Suspension 40 milliGRAM(s) Oral daily  phenylephrine    Infusion 0.1 MICROgram(s)/kG/Min IV Continuous <Continuous>  polyethylene glycol 3350 17 Gram(s) Oral daily  propofol Infusion 30.007 MICROgram(s)/kG/Min IV Continuous <Continuous>  senna 2 Tablet(s) Oral at bedtime  sodium chloride 0.9% lock flush 10 milliLiter(s) IV Push every 1 hour PRN  sodium chloride 0.9%. 1000 milliLiter(s) IV Continuous <Continuous>    Drug Dosing Weight  Height (cm): 167.6 (14 Mar 2021 12:03)  Weight (kg): 83.869 (14 Mar 2021 12:03)  BMI (kg/m2): 29.9 (14 Mar 2021 12:03)  BSA (m2): 1.93 (14 Mar 2021 12:03)    CENTRAL LINE: [x ] YES [ ] NO  LOCATION: [facility]   DATE INSERTED: 3/29  REMOVE: [ ] YES [ ] NO  EXPLAIN:    RIVERA: [x ] YES [ ] NO    DATE INSERTED:  REMOVE:  [ ] YES [ ] NO  EXPLAIN:    A-LINE:  [x ] YES [ ] NO  LOCATION: [facility]  DATE INSERTED: 3/29  REMOVE:  [ ] YES [ ] NO  EXPLAIN:    ICU Vital Signs Last 24 Hrs  T(C): 38.2 (02 Apr 2021 23:00), Max: 38.2 (02 Apr 2021 04:00)  T(F): 100.7 (02 Apr 2021 23:00), Max: 100.7 (02 Apr 2021 04:00)  HR: 132 (03 Apr 2021 00:00) (90 - 132)  BP: 143/71 (03 Apr 2021 00:00) (98/56 - 192/170)  BP(mean): 86 (03 Apr 2021 00:00) (64 - 176)  ABP: 133/67 (03 Apr 2021 00:00) (87/50 - 133/67)  ABP(mean): 86 (03 Apr 2021 00:00) (65 - 87)  RR: 25 (03 Apr 2021 00:00) (12 - 28)  SpO2: 92% (03 Apr 2021 00:00) (87% - 100%)      ABG - ( 02 Apr 2021 09:17 )  pH, Arterial: 7.36  pH, Blood: x     /  pCO2: 47    /  pO2: 61    / HCO3: 25    / Base Excess: 0.0   /  SaO2: 91        04-01 @ 07:01  -  04-02 @ 07:00  --------------------------------------------------------  IN: 4092.1 mL / OUT: 1050 mL / NET: 3042.1 mL        Mode: AC/ CMV (Assist Control/ Continuous Mandatory Ventilation)  RR (machine): 24  TV (machine): 450  FiO2: 60  PEEP: 8  ITime: 0.8  MAP: 15  PIP: 21      REVIEW OF SYSTEMS:    CONSTITUTIONAL: No weakness, fevers or chills  NECK: No pain or stiffness  RESPIRATORY: No cough, wheezing, hemoptysis; No shortness of breath  CARDIOVASCULAR: No chest pain or palpitations  GASTROINTESTINAL: No abdominal or epigastric pain. No nausea, vomiting, No diarrhea or constipation. No melena or hematochezia.  GENITOURINARY: No dysuria, frequency or hematuria  NEUROLOGICAL: No numbness or weakness  All other review of systems is negative unless indicated above      PHYSICAL EXAM:    GENERAL: sedated and intubated  HEAD:  Atraumatic, Normocephalic  EYES: EOMI, PERRLA, conjunctiva and sclera clear  ENMT: No tonsillar erythema, exudates, or enlargement; blood at nostrils  NECK: Supple, normal appearance,   NERVOUS SYSTEM: sedated and intubated  CHEST/LUNG: Pt has breath sounds on both sides   HEART: s1,s2  No murmurs, rubs, or gallops  ABDOMEN: Soft, Nontender, Nondistended;  EXTREMITIES:  2+ Peripheral Pulses, No clubbing, cyanosis, or edema  LYMPH: No lymphadenopathy noted  SKIN: No rashes or lesions; Good capillary refill      LABS:  CBC Full  -  ( 02 Apr 2021 03:48 )  WBC Count : 9.37 K/uL  RBC Count : 3.79 M/uL  Hemoglobin : 12.1 g/dL  Hematocrit : 37.3 %  Platelet Count - Automated : 155 K/uL  Mean Cell Volume : 98.4 fl  Mean Cell Hemoglobin : 31.9 pg  Mean Cell Hemoglobin Concentration : 32.4 gm/dL  Auto Neutrophil # : x  Auto Lymphocyte # : x  Auto Monocyte # : x  Auto Eosinophil # : x  Auto Basophil # : x  Auto Neutrophil % : x  Auto Lymphocyte % : x  Auto Monocyte % : x  Auto Eosinophil % : x  Auto Basophil % : x    04-02    140  |  106  |  20<H>  ----------------------------<  93  3.9   |  28  |  0.57    Ca    7.6<L>      02 Apr 2021 03:48  Phos  1.9     04-02  Mg     2.0     04-02    TPro  5.2<L>  /  Alb  1.8<L>  /  TBili  0.7  /  DBili  x   /  AST  40  /  ALT  108<H>  /  AlkPhos  94  04-02    RADIOLOGY & ADDITIONAL STUDIES REVIEWED:  ***    [ ]GOALS OF CARE DISCUSSION WITH PATIENT/FAMILY/PROXY:    CRITICAL CARE TIME SPENT: 35 minutes

## 2021-04-03 NOTE — PROGRESS NOTE ADULT - ASSESSMENT
ASSESSMENT AND PLAN:  55M, no PMH, PSH appy and moody 1990s presented 3/14 with x9 days worsening cough, subjective fevers, and SOB, with x2-3 days dysuria and central, non-radiating, constant CP. Admitted to [facility] for acute hypoxic respiratory failure s/t covid pneumonia, ICU consulted for increasing work of breathing on 15 lpm NRM.     1. Acute hypoxic respiratory failure  2. Covid pneumonia  3. Transaminitis  4. Prediabetes  5. Abnormal TSH    =================== Neuro============================  Alert and oriented x 3 at baseline     intubated and sedated 3/29      ================= Cardiovascular==========================  Hypertension  not on any pressor support, maintaining map above 60    TACHYCARDIA:  pT HR in 80s   will keep monitoring         ================- Pulm=================================  Acute hypoxic respiratory failure: secondary to covid pneumonia  -was on  HFNr then got intubated 3/29  -D-dimer initially elevated on presentation, now wnl  -Remdesivir was discontinued due to positive antibodies   - add albuterol prn   -procalcitonin wnl, d-dimer in 200s  -started pulse steroids for 3 days - 250mg solumedrol bid- completed3/27  - solumedrol 40 once daily    #Pneumothorax and pneumomediastinum:  xray chest: tiny left apical pneumothorax  thoracic surgery consulted recommended no intervention at this time, just observation for now  xray monitoring daily    ==================ID===================================  Covid pneumonia  - leukocytosis resolving  - febrile overnight to 100.7 f  -plan as above     ================= Nephro================================  -No issues   -monitor lytes, SCr   -monitor I/Os    =================GI====================================  Transaminitis:   likely secondary to covid   - and  on presentation  -Improved  -continue to monitor,  -  hepatitis panel -ve    diet:   ng tube and tube feed  bowel regimen    ================ Heme==================================  Elevated d-dimer: likely secondary to covid  -d-dimer 423 on presentation, now wnl  -continue prophylactic Lovenox 40 mg QD    =================Endocrine===============================  Prediabetes:    -a1c  5.8  -BS controlled  -continue HSS  -monitor FS while on steroids    Abnormal TSH:  -TSH level noted 0.26,   -TSH 0.37 and   - Free T4 1.82      ================= Skin/Catheters============================  No rashes. Peripheral IV lines.   central line, a-line, ng tube present 3/29,     - =================Prophylaxis =============================  Lovenox for DVT proph  Protonix for  GI proph    ==================GOC==================================   FULL CODE    spoke to brother with [place] interpretor and updated about pt condition.

## 2021-04-03 NOTE — PROGRESS NOTE ADULT - SUBJECTIVE AND OBJECTIVE BOX
Patient is a 55y old  Male who presents with a chief complaint of SOB (03 Apr 2021 06:48)  Patient remains sedated, intubated, laying in bed in NAD. Back to 100% FIO2 because of hypoxemia again.    INTERVAL HPI/OVERNIGHT EVENTS:      VITAL SIGNS:  T(F): 100.3 (04-03-21 @ 08:00)  HR: 121 (04-03-21 @ 11:34)  BP: 122/69 (04-03-21 @ 04:00)  RR: 24 (04-03-21 @ 11:00)  SpO2: 97% (04-03-21 @ 11:34)  Wt(kg): --  I&O's Detail    02 Apr 2021 07:01  -  03 Apr 2021 07:00  --------------------------------------------------------  IN:    Enteral Tube Flush: 220 mL    FentaNYL: 628.6 mL    Glucerna 1.5: 500 mL    Midazolam: 72 mL    Propofol: 564.6 mL  Total IN: 1985.2 mL    OUT:    Indwelling Catheter - Urethral (mL): 1055 mL    Phenylephrine: 0 mL    sodium chloride 0.9%: 0 mL  Total OUT: 1055 mL    Total NET: 930.2 mL      03 Apr 2021 07:01  -  03 Apr 2021 11:54  --------------------------------------------------------  IN:    FentaNYL: 134.4 mL    Glucerna 1.5: 40 mL    Midazolam: 16.8 mL    Propofol: 110.8 mL  Total IN: 302 mL    OUT:    Indwelling Catheter - Urethral (mL): 370 mL  Total OUT: 370 mL    Total NET: -68 mL        Mode: AC/ CMV (Assist Control/ Continuous Mandatory Ventilation)  RR (machine): 24  TV (machine): 450  FiO2: 100  PEEP: 8  ITime: 0.8  MAP: 14  PIP: 34        REVIEW OF SYSTEMS:    CONSTITUTIONAL:  No fevers, chills, sweats    HEENT:  Eyes:  No diplopia or blurred vision. ENT:  No earache, sore throat or runny nose.    CARDIOVASCULAR:  No pressure, squeezing, tightness, or heaviness about the chest; no palpitations.    RESPIRATORY:  Per HPI    GASTROINTESTINAL:  No abdominal pain, nausea, vomiting or diarrhea.    GENITOURINARY:  No dysuria, frequency or urgency.    NEUROLOGIC:  No paresthesias, fasciculations, seizures or weakness.    PSYCHIATRIC:  No disorder of thought or mood.      PHYSICAL EXAM:    Constitutional: Well developed and nourished  Eyes:Perrla  ENMT: normal  Neck:supple  Respiratory: good air entry  Cardiovascular: S1 S2 regular  Gastrointestinal: Soft, Non tender  Extremities: No edema  Vascular:normal  Neurological:Awake, alert,Ox3  Musculoskeletal:Normal      MEDICATIONS  (STANDING):  ALBUTerol    90 MICROgram(s) HFA Inhaler 2 Puff(s) Inhalation every 6 hours  ascorbic acid 1000 milliGRAM(s) Oral daily  aspirin  chewable 81 milliGRAM(s) Oral daily  bisacodyl Suppository 10 milliGRAM(s) Rectal daily  chlorhexidine 0.12% Liquid 15 milliLiter(s) Oral Mucosa two times a day  chlorhexidine 2% Cloths 1 Application(s) Topical <User Schedule>  enoxaparin Injectable 40 milliGRAM(s) SubCutaneous two times a day  fentaNYL   Infusion. 4 MICROgram(s)/kG/Hr (33.5 mL/Hr) IV Continuous <Continuous>  insulin lispro (ADMELOG) corrective regimen sliding scale   SubCutaneous every 6 hours  methylPREDNISolone sodium succinate Injectable 40 milliGRAM(s) IV Push daily  midazolam Infusion 0.05 mG/kG/Hr (4.19 mL/Hr) IV Continuous <Continuous>  pantoprazole   Suspension 40 milliGRAM(s) Oral daily  polyethylene glycol 3350 17 Gram(s) Oral daily  propofol Infusion 30.007 MICROgram(s)/kG/Min (15.1 mL/Hr) IV Continuous <Continuous>  senna 2 Tablet(s) Oral at bedtime  sodium chloride 0.9%. 1000 milliLiter(s) (75 mL/Hr) IV Continuous <Continuous>    MEDICATIONS  (PRN):  acetaminophen   Tablet .. 650 milliGRAM(s) Oral every 6 hours PRN Temp greater or equal to 38C (100.4F)  sodium chloride 0.9% lock flush 10 milliLiter(s) IV Push every 1 hour PRN Pre/post blood products, medications, blood draw, and to maintain line patency      Allergies    No Known Allergies    Intolerances        LABS:                        12.8   5.45  )-----------( 152      ( 03 Apr 2021 03:48 )             39.6     04-03    144  |  108  |  22<H>  ----------------------------<  112<H>  4.3   |  30  |  0.58    Ca    8.0<L>      03 Apr 2021 03:48  Phos  2.5     04-03  Mg     2.8     04-03    TPro  5.5<L>  /  Alb  1.5<L>  /  TBili  0.7  /  DBili  x   /  AST  40  /  ALT  82<H>  /  AlkPhos  101  04-03        ABG - ( 03 Apr 2021 04:21 )  pH, Arterial: 7.23  pH, Blood: x     /  pCO2: 66    /  pO2: 79    / HCO3: 27    / Base Excess: -1.9  /  SaO2: 94                    CAPILLARY BLOOD GLUCOSE      POCT Blood Glucose.: 124 mg/dL (03 Apr 2021 11:24)  POCT Blood Glucose.: 106 mg/dL (03 Apr 2021 05:08)  POCT Blood Glucose.: 91 mg/dL (02 Apr 2021 23:27)  POCT Blood Glucose.: 146 mg/dL (02 Apr 2021 16:13)    pro-bnp -- 04-03 @ 03:48     d-dimer 1250  04-03 @ 03:48  pro-bnp -- 03-31 @ 04:14     d-dimer 386  03-31 @ 04:14  pro-bnp -- 03-28 @ 03:54     d-dimer 224  03-28 @ 03:54      RADIOLOGY & ADDITIONAL TESTS:    CXR:    Ct scan chest:    ekg;    echo:

## 2021-04-04 LAB
ALBUMIN SERPL ELPH-MCNC: 1.3 G/DL — LOW (ref 3.5–5)
ALP SERPL-CCNC: 76 U/L — SIGNIFICANT CHANGE UP (ref 40–120)
ALT FLD-CCNC: 55 U/L DA — SIGNIFICANT CHANGE UP (ref 10–60)
ANION GAP SERPL CALC-SCNC: 3 MMOL/L — LOW (ref 5–17)
AST SERPL-CCNC: 25 U/L — SIGNIFICANT CHANGE UP (ref 10–40)
BASE EXCESS BLDA CALC-SCNC: 7 MMOL/L — HIGH (ref -2–2)
BASE EXCESS BLDA CALC-SCNC: 7.6 MMOL/L — HIGH (ref -2–2)
BILIRUB SERPL-MCNC: 0.4 MG/DL — SIGNIFICANT CHANGE UP (ref 0.2–1.2)
BLOOD GAS COMMENTS ARTERIAL: SIGNIFICANT CHANGE UP
BLOOD GAS COMMENTS ARTERIAL: SIGNIFICANT CHANGE UP
BUN SERPL-MCNC: 24 MG/DL — HIGH (ref 7–18)
CALCIUM SERPL-MCNC: 7.9 MG/DL — LOW (ref 8.4–10.5)
CHLORIDE SERPL-SCNC: 108 MMOL/L — SIGNIFICANT CHANGE UP (ref 96–108)
CO2 SERPL-SCNC: 34 MMOL/L — HIGH (ref 22–31)
CREAT SERPL-MCNC: 0.52 MG/DL — SIGNIFICANT CHANGE UP (ref 0.5–1.3)
CRP SERPL-MCNC: 334 MG/L — HIGH
FERRITIN SERPL-MCNC: 1185 NG/ML — HIGH (ref 30–400)
GLUCOSE BLDC GLUCOMTR-MCNC: 120 MG/DL — HIGH (ref 70–99)
GLUCOSE BLDC GLUCOMTR-MCNC: 125 MG/DL — HIGH (ref 70–99)
GLUCOSE BLDC GLUCOMTR-MCNC: 141 MG/DL — HIGH (ref 70–99)
GLUCOSE BLDC GLUCOMTR-MCNC: 150 MG/DL — HIGH (ref 70–99)
GLUCOSE BLDC GLUCOMTR-MCNC: 160 MG/DL — HIGH (ref 70–99)
GLUCOSE SERPL-MCNC: 121 MG/DL — HIGH (ref 70–99)
HCO3 BLDA-SCNC: 34 MMOL/L — HIGH (ref 23–27)
HCO3 BLDA-SCNC: 35 MMOL/L — HIGH (ref 23–27)
HCT VFR BLD CALC: 32.9 % — LOW (ref 39–50)
HGB BLD-MCNC: 10.4 G/DL — LOW (ref 13–17)
HOROWITZ INDEX BLDA+IHG-RTO: 60 — SIGNIFICANT CHANGE UP
HOROWITZ INDEX BLDA+IHG-RTO: 80 — SIGNIFICANT CHANGE UP
MAGNESIUM SERPL-MCNC: 2.7 MG/DL — HIGH (ref 1.6–2.6)
MCHC RBC-ENTMCNC: 31.6 GM/DL — LOW (ref 32–36)
MCHC RBC-ENTMCNC: 31.9 PG — SIGNIFICANT CHANGE UP (ref 27–34)
MCV RBC AUTO: 100.9 FL — HIGH (ref 80–100)
NRBC # BLD: 0 /100 WBCS — SIGNIFICANT CHANGE UP (ref 0–0)
PCO2 BLDA: 65 MMHG — HIGH (ref 32–46)
PCO2 BLDA: 67 MMHG — HIGH (ref 32–46)
PH BLDA: 7.33 — LOW (ref 7.35–7.45)
PH BLDA: 7.35 — SIGNIFICANT CHANGE UP (ref 7.35–7.45)
PHOSPHATE SERPL-MCNC: 2.1 MG/DL — LOW (ref 2.5–4.5)
PLATELET # BLD AUTO: 124 K/UL — LOW (ref 150–400)
PO2 BLDA: 131 MMHG — HIGH (ref 74–108)
PO2 BLDA: 91 MMHG — SIGNIFICANT CHANGE UP (ref 74–108)
POTASSIUM SERPL-MCNC: 4.9 MMOL/L — SIGNIFICANT CHANGE UP (ref 3.5–5.3)
POTASSIUM SERPL-SCNC: 4.9 MMOL/L — SIGNIFICANT CHANGE UP (ref 3.5–5.3)
PROCALCITONIN SERPL-MCNC: 1.51 NG/ML — HIGH (ref 0.02–0.1)
PROT SERPL-MCNC: 5 G/DL — LOW (ref 6–8.3)
RBC # BLD: 3.26 M/UL — LOW (ref 4.2–5.8)
RBC # FLD: 12.9 % — SIGNIFICANT CHANGE UP (ref 10.3–14.5)
SAO2 % BLDA: 97 % — HIGH (ref 92–96)
SAO2 % BLDA: 98 % — HIGH (ref 92–96)
SODIUM SERPL-SCNC: 145 MMOL/L — SIGNIFICANT CHANGE UP (ref 135–145)
WBC # BLD: 8.69 K/UL — SIGNIFICANT CHANGE UP (ref 3.8–10.5)
WBC # FLD AUTO: 8.69 K/UL — SIGNIFICANT CHANGE UP (ref 3.8–10.5)

## 2021-04-04 PROCEDURE — 71045 X-RAY EXAM CHEST 1 VIEW: CPT | Mod: 26

## 2021-04-04 RX ORDER — PROPOFOL 10 MG/ML
30.01 INJECTION, EMULSION INTRAVENOUS
Qty: 1000 | Refills: 0 | Status: DISCONTINUED | OUTPATIENT
Start: 2021-04-04 | End: 2021-04-09

## 2021-04-04 RX ORDER — POTASSIUM PHOSPHATE, MONOBASIC POTASSIUM PHOSPHATE, DIBASIC 236; 224 MG/ML; MG/ML
15 INJECTION, SOLUTION INTRAVENOUS ONCE
Refills: 0 | Status: DISCONTINUED | OUTPATIENT
Start: 2021-04-04 | End: 2021-04-04

## 2021-04-04 RX ADMIN — FENTANYL CITRATE 33.5 MICROGRAM(S)/KG/HR: 50 INJECTION INTRAVENOUS at 08:33

## 2021-04-04 RX ADMIN — PROPOFOL 15.1 MICROGRAM(S)/KG/MIN: 10 INJECTION, EMULSION INTRAVENOUS at 19:39

## 2021-04-04 RX ADMIN — PROPOFOL 15.1 MICROGRAM(S)/KG/MIN: 10 INJECTION, EMULSION INTRAVENOUS at 15:06

## 2021-04-04 RX ADMIN — FENTANYL CITRATE 33.5 MICROGRAM(S)/KG/HR: 50 INJECTION INTRAVENOUS at 17:05

## 2021-04-04 RX ADMIN — ALBUTEROL 2 PUFF(S): 90 AEROSOL, METERED ORAL at 16:31

## 2021-04-04 RX ADMIN — PROPOFOL 15.1 MICROGRAM(S)/KG/MIN: 10 INJECTION, EMULSION INTRAVENOUS at 11:14

## 2021-04-04 RX ADMIN — Medication 1: at 11:12

## 2021-04-04 RX ADMIN — Medication 1000 MILLIGRAM(S): at 11:13

## 2021-04-04 RX ADMIN — ENOXAPARIN SODIUM 40 MILLIGRAM(S): 100 INJECTION SUBCUTANEOUS at 06:45

## 2021-04-04 RX ADMIN — Medication 62.5 MILLIMOLE(S): at 08:34

## 2021-04-04 RX ADMIN — ALBUTEROL 2 PUFF(S): 90 AEROSOL, METERED ORAL at 08:37

## 2021-04-04 RX ADMIN — CHLORHEXIDINE GLUCONATE 15 MILLILITER(S): 213 SOLUTION TOPICAL at 17:05

## 2021-04-04 RX ADMIN — FENTANYL CITRATE 33.5 MICROGRAM(S)/KG/HR: 50 INJECTION INTRAVENOUS at 01:18

## 2021-04-04 RX ADMIN — Medication 81 MILLIGRAM(S): at 11:13

## 2021-04-04 RX ADMIN — PROPOFOL 15.1 MICROGRAM(S)/KG/MIN: 10 INJECTION, EMULSION INTRAVENOUS at 03:59

## 2021-04-04 RX ADMIN — PROPOFOL 15.1 MICROGRAM(S)/KG/MIN: 10 INJECTION, EMULSION INTRAVENOUS at 06:58

## 2021-04-04 RX ADMIN — PANTOPRAZOLE SODIUM 40 MILLIGRAM(S): 20 TABLET, DELAYED RELEASE ORAL at 11:13

## 2021-04-04 RX ADMIN — MIDAZOLAM HYDROCHLORIDE 4.19 MG/KG/HR: 1 INJECTION, SOLUTION INTRAMUSCULAR; INTRAVENOUS at 13:25

## 2021-04-04 RX ADMIN — CHLORHEXIDINE GLUCONATE 15 MILLILITER(S): 213 SOLUTION TOPICAL at 06:45

## 2021-04-04 RX ADMIN — SENNA PLUS 2 TABLET(S): 8.6 TABLET ORAL at 21:16

## 2021-04-04 RX ADMIN — ALBUTEROL 2 PUFF(S): 90 AEROSOL, METERED ORAL at 02:10

## 2021-04-04 RX ADMIN — CHLORHEXIDINE GLUCONATE 1 APPLICATION(S): 213 SOLUTION TOPICAL at 06:45

## 2021-04-04 RX ADMIN — ALBUTEROL 2 PUFF(S): 90 AEROSOL, METERED ORAL at 20:27

## 2021-04-04 RX ADMIN — ENOXAPARIN SODIUM 40 MILLIGRAM(S): 100 INJECTION SUBCUTANEOUS at 17:05

## 2021-04-04 RX ADMIN — Medication 40 MILLIGRAM(S): at 06:45

## 2021-04-04 NOTE — PROGRESS NOTE ADULT - ASSESSMENT
ASSESSMENT AND PLAN:  55M, no PMH, PSH appy and moody 1990s presented 3/14 with x9 days worsening cough, subjective fevers, and SOB, with x2-3 days dysuria and central, non-radiating, constant CP. Admitted to [facility] for acute hypoxic respiratory failure s/t covid pneumonia, ICU consulted for increasing work of breathing on 15 lpm NRM.     1. Acute hypoxic respiratory failure  2. Covid pneumonia  3. Transaminitis  4. Prediabetes  5. Abnormal TSH    =================== Neuro============================  Alert and oriented x 3 at baseline     intubated and sedated 3/29      ================= Cardiovascular==========================  Hypertension  not on any pressor support, maintaining map above 60    TACHYCARDIA:  pT HR in 80s   will keep monitoring         ================- Pulm=================================  Acute hypoxic respiratory failure: secondary to covid pneumonia  -was on  HFNr then got intubated 3/29  -D-dimer initially elevated on presentation, now wnl  -Remdesivir was discontinued due to positive antibodies   - add albuterol prn   -procalcitonin wnl, d-dimer in 200s  -started pulse steroids for 3 days - 250mg solumedrol bid- completed3/27  - solumedrol 40 once daily    #Pneumothorax and pneumomediastinum:  xray chest: tiny left apical pneumothorax  thoracic surgery consulted recommended no intervention at this time, just observation for now  xray monitoring daily    ==================ID===================================  Covid pneumonia  - leukocytosis resolving  - febrile overnight to 100.7 f  -plan as above     ================= Nephro================================  -No issues   -monitor lytes, SCr   -monitor I/Os    =================GI====================================  Transaminitis:   likely secondary to covid   - and  on presentation  -Improved  -continue to monitor,  -  hepatitis panel -ve    diet:   ng tube and tube feed  bowel regimen    ================ Heme==================================  Elevated d-dimer: likely secondary to covid  -d-dimer 423 on presentation, now wnl  -continue prophylactic Lovenox 40 mg QD    =================Endocrine===============================  Prediabetes:    -a1c  5.8  -BS controlled  -continue HSS  -monitor FS while on steroids    Abnormal TSH:  -TSH level noted 0.26,   -TSH 0.37 and   - Free T4 1.82      ================= Skin/Catheters============================  No rashes. Peripheral IV lines.   central line, a-line, ng tube present 3/29,     - =================Prophylaxis =============================  Lovenox for DVT proph  Protonix for  GI proph    ==================GOC==================================   FULL CODE    spoke to brother with [employer] interpretor and updated about pt condition.  ASSESSMENT AND PLAN:  55M, no PMH, PSH appy and moody 1990s presented 3/14 with x9 days worsening cough, subjective fevers, and SOB, with x2-3 days dysuria and central, non-radiating, constant CP. Admitted to [facility] for acute hypoxic respiratory failure s/t covid pneumonia, ICU consulted for increasing work of breathing on 15 lpm NRM.     1. Acute hypoxic respiratory failure  2. Covid pneumonia  3. Transaminitis  4. Prediabetes  5. Abnormal TSH    =================== Neuro============================  Alert and oriented x 3 at baseline     intubated and sedated 3/29      ================= Cardiovascular==========================  Hypertension  not on any pressor support, maintaining map above 60    TACHYCARDIA:  pT HR in 80s   will keep monitoring         ================- Pulm=================================  Acute hypoxic respiratory failure: secondary to covid pneumonia  -was on  HFNr then got intubated 3/29  -D-dimer initially elevated on presentation, now wnl  -Remdesivir was discontinued due to positive antibodies   - add albuterol prn   -procalcitonin wnl, d-dimer in 200s  -started pulse steroids for 3 days - 250mg solumedrol bid- completed3/27  - solumedrol 40 once daily    #Pneumothorax and pneumomediastinum:  xray chest: tiny left apical pneumothorax  thoracic surgery consulted recommended no intervention at this time, just observation for now  xray monitoring daily    ==================ID===================================  Covid pneumonia  - leukocytosis resolved  -plan as above     ================= Nephro================================  -No issues   -monitor lytes, SCr   -monitor I/Os    =================GI====================================  Transaminitis:   likely secondary to covid   - and  on presentation  -Improved  -continue to monitor,  -  hepatitis panel -ve    diet:   ng tube and tube feed  bowel regimen    ================ Heme==================================  Elevated d-dimer: likely secondary to covid  -d-dimer 423 on presentation, now wnl  -continue prophylactic Lovenox 40 mg bid    =================Endocrine===============================  Prediabetes:    -a1c  5.8  -BS controlled  -continue HSS  -monitor FS while on steroids    Abnormal TSH:  -TSH level noted 0.26,   -TSH 0.37 and   - Free T4 1.82      ================= Skin/Catheters============================  No rashes. Peripheral IV lines.   central line, a-line, ng tube present 3/29,     - =================Prophylaxis =============================  Lovenox for DVT proph  Protonix for  GI proph    ==================GOC==================================   FULL CODE    spoke to brother with [employer] interpretor and updated about pt condition.

## 2021-04-04 NOTE — PROGRESS NOTE ADULT - SUBJECTIVE AND OBJECTIVE BOX
Patient is a 55y old  Male who presents with a chief complaint of SOB (04 Apr 2021 03:29)    pt seen in icu [ x ], reg med floor [   ], bed [ x ], chair at bedside [   ], a+o x3 [  ], sedated [x  ],  nad [x  ]    andrea [ x ], ngt [x  ], et tube [ x ], cent line [x  ],          Allergies    No Known Allergies        Vitals    T(F): 99.1 (04-04-21 @ 04:00), Max: 100.7 (04-04-21 @ 02:00)  HR: 111 (04-04-21 @ 04:05) (109 - 141)  BP: --  RR: 24 (04-04-21 @ 04:00) (23 - 32)  SpO2: 97% (04-04-21 @ 04:05) (92% - 97%)  Wt(kg): --  CAPILLARY BLOOD GLUCOSE      POCT Blood Glucose.: 120 mg/dL (04 Apr 2021 06:31)      Labs                          10.4   8.69  )-----------( 124      ( 04 Apr 2021 03:57 )             32.9       04-04    145  |  108  |  24<H>  ----------------------------<  121<H>  4.9   |  34<H>  |  0.52    Ca    7.9<L>      04 Apr 2021 03:57  Phos  2.1     04-04  Mg     2.7     04-04    TPro  5.0<L>  /  Alb  1.3<L>  /  TBili  0.4  /  DBili  x   /  AST  25  /  ALT  55  /  AlkPhos  76  04-04            .Urine Clean Catch (Midstream)  03-15 @ 00:52   No growth  --  --          Radiology Results      Meds    MEDICATIONS  (STANDING):  ALBUTerol    90 MICROgram(s) HFA Inhaler 2 Puff(s) Inhalation every 6 hours  ascorbic acid 1000 milliGRAM(s) Oral daily  aspirin  chewable 81 milliGRAM(s) Oral daily  bisacodyl Suppository 10 milliGRAM(s) Rectal daily  chlorhexidine 0.12% Liquid 15 milliLiter(s) Oral Mucosa two times a day  chlorhexidine 2% Cloths 1 Application(s) Topical <User Schedule>  enoxaparin Injectable 40 milliGRAM(s) SubCutaneous two times a day  fentaNYL   Infusion. 4 MICROgram(s)/kG/Hr (33.5 mL/Hr) IV Continuous <Continuous>  insulin lispro (ADMELOG) corrective regimen sliding scale   SubCutaneous every 6 hours  methylPREDNISolone sodium succinate Injectable 40 milliGRAM(s) IV Push daily  midazolam Infusion 0.05 mG/kG/Hr (4.19 mL/Hr) IV Continuous <Continuous>  pantoprazole   Suspension 40 milliGRAM(s) Oral daily  polyethylene glycol 3350 17 Gram(s) Oral daily  propofol Infusion 30.007 MICROgram(s)/kG/Min (15.1 mL/Hr) IV Continuous <Continuous>  senna 2 Tablet(s) Oral at bedtime  sodium chloride 0.9%. 1000 milliLiter(s) (75 mL/Hr) IV Continuous <Continuous>      MEDICATIONS  (PRN):  acetaminophen    Suspension .. 650 milliGRAM(s) Oral every 6 hours PRN Temp greater or equal to 38C (100.4F)  sodium chloride 0.9% lock flush 10 milliLiter(s) IV Push every 1 hour PRN Pre/post blood products, medications, blood draw, and to maintain line patency      Physical Exam    Neuro :  no focal deficits  Respiratory: CTA B/L  CV: RRR, S1S2, no murmurs,   Abdominal: Soft, NT, ND +BS,  Extremities: No edema, + peripheral pulses      ASSESSMENT    Hypoxemia 2nd to covid pna   transaminitis  prediabetes  h/o appendectomy  cholecystectomy        PLAN    contact and airborne isolation  d/c remdesevir given covid ab positive noted   completed dexamethasone   started pulse steroids for 3 days - 250mg solumedrol bid now tapered to 40mg daily  cont asa, vit c,    cont albuterol inhaler   pulm f/u  procalcitonin, D-dimer, crp, ldh, ferritin, lactate noted ,    tmx 100.7  cont tylenol prn,   cont robitussin prn   pt on fentanyl, midazolam and propofol drip  s/p intubation 3/29/21  O2 sat (87% - 100%) mech vent  O2 via mech vent   xray 3/19/21 with pneumomediastinum  rept cxr with New trace right apical pneumothorax. New mild left apical pneumothorax. Grossly stable small pneumomediastinum.  Soft tissue emphysema at the neck bases bilaterally. Grossly stable bilateral pulmonary infiltrates noted.   cxr 2/24 with No evidence of pneumothorax can be appreciated on the available image. This may be related to patient positioning. Evidence of pneumomediastinum and subcutaneous emphysema in the lower neck is again noted. There are patchy bibasilar infiltrates and elevated right hemidiaphragm noted.   cxr 3/29/21 with No significant change bilateral infiltrates. There is a small simple left apical pneumothorax. No significant pleural effusion. Bilateral subcutaneous emphysema similar to prior.   thoracic surg f/u   pt intubated 6AM 3/29/21, XRs on 7AM and 5PM with no obvious increase of ptx  no immediate need for CT at this time,   recommend close monitoring with serial CXRs   lispro ss   prognosis poor  cont current meds  cont mgmt as per icu     Patient is a 55y old  Male who presents with a chief complaint of SOB (04 Apr 2021 03:29)    pt seen in icu [ x ], reg med floor [   ], bed [ x ], chair at bedside [   ], a+o x3 [  ], sedated [x  ],  nad [x  ]    andrea [ x ], ngt [x  ], et tube [ x ], cent line [x  ],          Allergies    No Known Allergies        Vitals    T(F): 99.1 (04-04-21 @ 04:00), Max: 100.7 (04-04-21 @ 02:00)  HR: 111 (04-04-21 @ 04:05) (109 - 141)  BP: --  RR: 24 (04-04-21 @ 04:00) (23 - 32)  SpO2: 97% (04-04-21 @ 04:05) (92% - 97%)  Wt(kg): --  CAPILLARY BLOOD GLUCOSE      POCT Blood Glucose.: 120 mg/dL (04 Apr 2021 06:31)      Labs                          10.4   8.69  )-----------( 124      ( 04 Apr 2021 03:57 )             32.9       04-04    145  |  108  |  24<H>  ----------------------------<  121<H>  4.9   |  34<H>  |  0.52    Ca    7.9<L>      04 Apr 2021 03:57  Phos  2.1     04-04  Mg     2.7     04-04    TPro  5.0<L>  /  Alb  1.3<L>  /  TBili  0.4  /  DBili  x   /  AST  25  /  ALT  55  /  AlkPhos  76  04-04            .Urine Clean Catch (Midstream)  03-15 @ 00:52   No growth  --  --          Radiology Results      Meds    MEDICATIONS  (STANDING):  ALBUTerol    90 MICROgram(s) HFA Inhaler 2 Puff(s) Inhalation every 6 hours  ascorbic acid 1000 milliGRAM(s) Oral daily  aspirin  chewable 81 milliGRAM(s) Oral daily  bisacodyl Suppository 10 milliGRAM(s) Rectal daily  chlorhexidine 0.12% Liquid 15 milliLiter(s) Oral Mucosa two times a day  chlorhexidine 2% Cloths 1 Application(s) Topical <User Schedule>  enoxaparin Injectable 40 milliGRAM(s) SubCutaneous two times a day  fentaNYL   Infusion. 4 MICROgram(s)/kG/Hr (33.5 mL/Hr) IV Continuous <Continuous>  insulin lispro (ADMELOG) corrective regimen sliding scale   SubCutaneous every 6 hours  methylPREDNISolone sodium succinate Injectable 40 milliGRAM(s) IV Push daily  midazolam Infusion 0.05 mG/kG/Hr (4.19 mL/Hr) IV Continuous <Continuous>  pantoprazole   Suspension 40 milliGRAM(s) Oral daily  polyethylene glycol 3350 17 Gram(s) Oral daily  propofol Infusion 30.007 MICROgram(s)/kG/Min (15.1 mL/Hr) IV Continuous <Continuous>  senna 2 Tablet(s) Oral at bedtime  sodium chloride 0.9%. 1000 milliLiter(s) (75 mL/Hr) IV Continuous <Continuous>      MEDICATIONS  (PRN):  acetaminophen    Suspension .. 650 milliGRAM(s) Oral every 6 hours PRN Temp greater or equal to 38C (100.4F)  sodium chloride 0.9% lock flush 10 milliLiter(s) IV Push every 1 hour PRN Pre/post blood products, medications, blood draw, and to maintain line patency      Physical Exam    Neuro :  no focal deficits  Respiratory: CTA B/L  CV: RRR, S1S2, no murmurs,   Abdominal: Soft, NT, ND +BS,  Extremities: No edema, + peripheral pulses      ASSESSMENT    Hypoxemia 2nd to covid pna   transaminitis  prediabetes  h/o appendectomy  cholecystectomy        PLAN    contact and airborne isolation  d/c remdesevir given covid ab positive noted   completed dexamethasone   started pulse steroids for 3 days - 250mg solumedrol bid now tapered to 40mg daily  cont asa, vit c,    cont albuterol inhaler   pulm f/u  procalcitonin, D-dimer, crp, ldh, ferritin, lactate noted ,    tmx 100.7  cont tylenol prn,   cont robitussin prn   pt on fentanyl, midazolam and propofol drip  s/p intubation 3/29/21  O2 sat (92% - 97%) mech vent  O2 via mech vent   xray 3/19/21 with pneumomediastinum  rept cxr with New trace right apical pneumothorax. New mild left apical pneumothorax. Grossly stable small pneumomediastinum.  Soft tissue emphysema at the neck bases bilaterally. Grossly stable bilateral pulmonary infiltrates noted.   cxr 2/24 with No evidence of pneumothorax can be appreciated on the available image. This may be related to patient positioning. Evidence of pneumomediastinum and subcutaneous emphysema in the lower neck is again noted. There are patchy bibasilar infiltrates and elevated right hemidiaphragm noted.   cxr 3/29/21 with No significant change bilateral infiltrates. There is a small simple left apical pneumothorax. No significant pleural effusion. Bilateral subcutaneous emphysema similar to prior.   thoracic surg f/u   pt intubated 6AM 3/29/21, XRs on 7AM and 5PM with no obvious increase of ptx  no immediate need for CT at this time,   recommend close monitoring with serial CXRs   lispro ss   prognosis poor  cont current meds  cont mgmt as per icu

## 2021-04-04 NOTE — PROGRESS NOTE ADULT - SUBJECTIVE AND OBJECTIVE BOX
Patient is a 55y old  Male who presents with a chief complaint of SOB (04 Apr 2021 06:40)  Patient is sedated, intubated, laying in bed in NAD    INTERVAL HPI/OVERNIGHT EVENTS:      VITAL SIGNS:  T(F): 100 (04-04-21 @ 07:00)  HR: 113 (04-04-21 @ 10:00)  BP: --  RR: 24 (04-04-21 @ 10:00)  SpO2: 98% (04-04-21 @ 10:00)  Wt(kg): --  I&O's Detail    03 Apr 2021 07:01  -  04 Apr 2021 07:00  --------------------------------------------------------  IN:    Enteral Tube Flush: 60 mL    FentaNYL: 806.4 mL    Glucerna 1.5: 330 mL    Midazolam: 109.6 mL    Propofol: 20.1 mL    Propofol: 432.7 mL  Total IN: 1758.8 mL    OUT:    Indwelling Catheter - Urethral (mL): 1550 mL    Nasogastric/Oral tube (mL): 120 mL    Rectal Tube (mL): 100 mL  Total OUT: 1770 mL    Total NET: -11.2 mL      04 Apr 2021 07:01  -  04 Apr 2021 10:51  --------------------------------------------------------  IN:    FentaNYL: 100.8 mL    Glucerna 1.5: 75 mL    Midazolam: 15 mL    Propofol: 60.3 mL  Total IN: 251.1 mL    OUT:    Indwelling Catheter - Urethral (mL): 225 mL  Total OUT: 225 mL    Total NET: 26.1 mL        Mode: AC/ CMV (Assist Control/ Continuous Mandatory Ventilation)  RR (machine): 24  TV (machine): 450  FiO2: 100  PEEP: 8  ITime: 0.8  MAP: 15  PIP: 36        REVIEW OF SYSTEMS:    CONSTITUTIONAL:  No fevers, chills, sweats    HEENT:  Eyes:  No diplopia or blurred vision. ENT:  No earache, sore throat or runny nose.    CARDIOVASCULAR:  No pressure, squeezing, tightness, or heaviness about the chest; no palpitations.    RESPIRATORY:  Per HPI    GASTROINTESTINAL:  No abdominal pain, nausea, vomiting or diarrhea.    GENITOURINARY:  No dysuria, frequency or urgency.    NEUROLOGIC:  No paresthesias, fasciculations, seizures or weakness.    PSYCHIATRIC:  No disorder of thought or mood.      PHYSICAL EXAM:    Constitutional: Well developed and nourished  Eyes:Perrla  ENMT: normal  Neck:supple  Respiratory: good air entry  Cardiovascular: S1 S2 regular  Gastrointestinal: Soft, Non tender  Extremities: No edema  Vascular:normal  Neurological:Sedated  Musculoskeletal:Normal      MEDICATIONS  (STANDING):  ALBUTerol    90 MICROgram(s) HFA Inhaler 2 Puff(s) Inhalation every 6 hours  ascorbic acid 1000 milliGRAM(s) Oral daily  aspirin  chewable 81 milliGRAM(s) Oral daily  bisacodyl Suppository 10 milliGRAM(s) Rectal daily  chlorhexidine 0.12% Liquid 15 milliLiter(s) Oral Mucosa two times a day  chlorhexidine 2% Cloths 1 Application(s) Topical <User Schedule>  enoxaparin Injectable 40 milliGRAM(s) SubCutaneous two times a day  fentaNYL   Infusion. 4 MICROgram(s)/kG/Hr (33.5 mL/Hr) IV Continuous <Continuous>  insulin lispro (ADMELOG) corrective regimen sliding scale   SubCutaneous every 6 hours  methylPREDNISolone sodium succinate Injectable 40 milliGRAM(s) IV Push daily  midazolam Infusion 0.05 mG/kG/Hr (4.19 mL/Hr) IV Continuous <Continuous>  pantoprazole   Suspension 40 milliGRAM(s) Oral daily  polyethylene glycol 3350 17 Gram(s) Oral daily  propofol Infusion 30.007 MICROgram(s)/kG/Min (15.1 mL/Hr) IV Continuous <Continuous>  senna 2 Tablet(s) Oral at bedtime  sodium chloride 0.9%. 1000 milliLiter(s) (75 mL/Hr) IV Continuous <Continuous>    MEDICATIONS  (PRN):  acetaminophen    Suspension .. 650 milliGRAM(s) Oral every 6 hours PRN Temp greater or equal to 38C (100.4F)  sodium chloride 0.9% lock flush 10 milliLiter(s) IV Push every 1 hour PRN Pre/post blood products, medications, blood draw, and to maintain line patency      Allergies    No Known Allergies    Intolerances        LABS:                        10.4   8.69  )-----------( 124      ( 04 Apr 2021 03:57 )             32.9     04-04    145  |  108  |  24<H>  ----------------------------<  121<H>  4.9   |  34<H>  |  0.52    Ca    7.9<L>      04 Apr 2021 03:57  Phos  2.1     04-04  Mg     2.7     04-04    TPro  5.0<L>  /  Alb  1.3<L>  /  TBili  0.4  /  DBili  x   /  AST  25  /  ALT  55  /  AlkPhos  76  04-04        ABG - ( 04 Apr 2021 04:00 )  pH, Arterial: 7.33  pH, Blood: x     /  pCO2: 67    /  pO2: 131   / HCO3: 34    / Base Excess: 7.0   /  SaO2: 98                    CAPILLARY BLOOD GLUCOSE      POCT Blood Glucose.: 160 mg/dL (04 Apr 2021 10:38)  POCT Blood Glucose.: 120 mg/dL (04 Apr 2021 06:31)  POCT Blood Glucose.: 125 mg/dL (04 Apr 2021 01:28)  POCT Blood Glucose.: 138 mg/dL (03 Apr 2021 17:17)  POCT Blood Glucose.: 124 mg/dL (03 Apr 2021 11:24)    pro-bnp -- 04-03 @ 03:48     d-dimer 1250  04-03 @ 03:48  pro-bnp -- 03-31 @ 04:14     d-dimer 386  03-31 @ 04:14      RADIOLOGY & ADDITIONAL TESTS:    CXR:  < from: Xray Chest 1 View- PORTABLE-Routine (Xray Chest 1 View- PORTABLE-Routine in AM.) (04.03.21 @ 11:30) >  Multifocal airspace disease increased from prior exam.    < end of copied text >    Ct scan chest:    ekg;    echo:

## 2021-04-04 NOTE — PROGRESS NOTE ADULT - SUBJECTIVE AND OBJECTIVE BOX
INTERVAL HPI/OVERNIGHT EVENTS: TF were held due to residuals. Patient urine was green so switched to versed from propofol earlier.   Pt spiked a temp of 100.2.     PRESSORS: [ ] YES [x ] NO    Antimicrobial:    Cardiovascular:    Pulmonary:  ALBUTerol    90 MICROgram(s) HFA Inhaler 2 Puff(s) Inhalation every 6 hours    Hematalogic:  aspirin  chewable 81 milliGRAM(s) Oral daily  enoxaparin Injectable 40 milliGRAM(s) SubCutaneous two times a day    Other:  acetaminophen    Suspension .. 650 milliGRAM(s) Oral every 6 hours PRN  ascorbic acid 1000 milliGRAM(s) Oral daily  bisacodyl Suppository 10 milliGRAM(s) Rectal daily  chlorhexidine 0.12% Liquid 15 milliLiter(s) Oral Mucosa two times a day  chlorhexidine 2% Cloths 1 Application(s) Topical <User Schedule>  fentaNYL   Infusion. 4 MICROgram(s)/kG/Hr IV Continuous <Continuous>  insulin lispro (ADMELOG) corrective regimen sliding scale   SubCutaneous every 6 hours  methylPREDNISolone sodium succinate Injectable 40 milliGRAM(s) IV Push daily  midazolam Infusion 0.05 mG/kG/Hr IV Continuous <Continuous>  pantoprazole   Suspension 40 milliGRAM(s) Oral daily  polyethylene glycol 3350 17 Gram(s) Oral daily  propofol Infusion 30.007 MICROgram(s)/kG/Min IV Continuous <Continuous>  senna 2 Tablet(s) Oral at bedtime  sodium chloride 0.9% lock flush 10 milliLiter(s) IV Push every 1 hour PRN  sodium chloride 0.9%. 1000 milliLiter(s) IV Continuous <Continuous>    acetaminophen    Suspension .. 650 milliGRAM(s) Oral every 6 hours PRN  ALBUTerol    90 MICROgram(s) HFA Inhaler 2 Puff(s) Inhalation every 6 hours  ascorbic acid 1000 milliGRAM(s) Oral daily  aspirin  chewable 81 milliGRAM(s) Oral daily  bisacodyl Suppository 10 milliGRAM(s) Rectal daily  chlorhexidine 0.12% Liquid 15 milliLiter(s) Oral Mucosa two times a day  chlorhexidine 2% Cloths 1 Application(s) Topical <User Schedule>  enoxaparin Injectable 40 milliGRAM(s) SubCutaneous two times a day  fentaNYL   Infusion. 4 MICROgram(s)/kG/Hr IV Continuous <Continuous>  insulin lispro (ADMELOG) corrective regimen sliding scale   SubCutaneous every 6 hours  methylPREDNISolone sodium succinate Injectable 40 milliGRAM(s) IV Push daily  midazolam Infusion 0.05 mG/kG/Hr IV Continuous <Continuous>  pantoprazole   Suspension 40 milliGRAM(s) Oral daily  polyethylene glycol 3350 17 Gram(s) Oral daily  propofol Infusion 30.007 MICROgram(s)/kG/Min IV Continuous <Continuous>  senna 2 Tablet(s) Oral at bedtime  sodium chloride 0.9% lock flush 10 milliLiter(s) IV Push every 1 hour PRN  sodium chloride 0.9%. 1000 milliLiter(s) IV Continuous <Continuous>    Drug Dosing Weight  Height (cm): 167.6 (14 Mar 2021 12:03)  Weight (kg): 83.869 (14 Mar 2021 12:03)  BMI (kg/m2): 29.9 (14 Mar 2021 12:03)  BSA (m2): 1.93 (14 Mar 2021 12:03)    CENTRAL LINE: [x ] YES [ ] NO  LOCATION:   DATE INSERTED:  REMOVE: [ ] YES [ ] NO  EXPLAIN:    RIVERA: [x ] YES [ ] NO    DATE INSERTED:  REMOVE:  [ ] YES [ ] NO  EXPLAIN:    A-LINE:  [x ] YES [ ] NO  LOCATION:   DATE INSERTED:  REMOVE:  [ ] YES [ ] NO  EXPLAIN:    PMH -reviewed admission note, no change since admission      ABG - ( 03 Apr 2021 04:21 )  pH, Arterial: 7.23  pH, Blood: x     /  pCO2: 66    /  pO2: 79    / HCO3: 27    / Base Excess: -1.9  /  SaO2: 94                    04-02 @ 07:01  -  04-03 @ 07:00  --------------------------------------------------------  IN: 1985.2 mL / OUT: 1055 mL / NET: 930.2 mL        Mode: AC/ CMV (Assist Control/ Continuous Mandatory Ventilation)  RR (machine): 24  TV (machine): 450  FiO2: 60  PEEP: 8  ITime: 0.8  MAP: 15  PIP: 37      PHYSICAL EXAM:    GENERAL: sedated and intubated  HEAD:  Atraumatic, Normocephalic  EYES: EOMI, PERRLA, conjunctiva and sclera clear  ENMT: No tonsillar erythema, exudates, or enlargement; blood at nostrils  NECK: Supple, normal appearance,   NERVOUS SYSTEM: sedated and intubated  CHEST/LUNG: Pt has breath sounds on both sides   HEART: s1,s2  No murmurs, rubs, or gallops  ABDOMEN: Soft, Nontender, Nondistended;  EXTREMITIES:  2+ Peripheral Pulses, No clubbing, cyanosis, or edema  LYMPH: No lymphadenopathy noted  SKIN: No rashes or lesions; Good capillary refill    LABS:  CBC Full  -  ( 03 Apr 2021 03:48 )  WBC Count : 5.45 K/uL  RBC Count : 3.94 M/uL  Hemoglobin : 12.8 g/dL  Hematocrit : 39.6 %  Platelet Count - Automated : 152 K/uL  Mean Cell Volume : 100.5 fl  Mean Cell Hemoglobin : 32.5 pg  Mean Cell Hemoglobin Concentration : 32.3 gm/dL  Auto Neutrophil # : x  Auto Lymphocyte # : x  Auto Monocyte # : x  Auto Eosinophil # : x  Auto Basophil # : x  Auto Neutrophil % : x  Auto Lymphocyte % : x  Auto Monocyte % : x  Auto Eosinophil % : x  Auto Basophil % : x    04-03    144  |  108  |  22<H>  ----------------------------<  112<H>  4.3   |  30  |  0.58    Ca    8.0<L>      03 Apr 2021 03:48  Phos  2.5     04-03  Mg     2.8     04-03    TPro  5.5<L>  /  Alb  1.5<L>  /  TBili  0.7  /  DBili  x   /  AST  40  /  ALT  82<H>  /  AlkPhos  101  04-03            RADIOLOGY & ADDITIONAL STUDIES REVIEWED:   [ ]GOALS OF CARE DISCUSSION WITH PATIENT/FAMILY/PROXY:    CRITICAL CARE TIME SPENT: 35 minutes INTERVAL HPI/OVERNIGHT EVENTS: TF were held due to residuals. Patient urine was green so switched to versed from propofol earlier.   Pt spiked a temp of 100.2.     PRESSORS: [ ] YES [x ] NO      Pulmonary:  ALBUTerol    90 MICROgram(s) HFA Inhaler 2 Puff(s) Inhalation every 6 hours    Hematalogic:  aspirin  chewable 81 milliGRAM(s) Oral daily  enoxaparin Injectable 40 milliGRAM(s) SubCutaneous two times a day    Other:  acetaminophen    Suspension .. 650 milliGRAM(s) Oral every 6 hours PRN  ascorbic acid 1000 milliGRAM(s) Oral daily  bisacodyl Suppository 10 milliGRAM(s) Rectal daily  chlorhexidine 0.12% Liquid 15 milliLiter(s) Oral Mucosa two times a day  chlorhexidine 2% Cloths 1 Application(s) Topical <User Schedule>  fentaNYL   Infusion. 4 MICROgram(s)/kG/Hr IV Continuous <Continuous>  insulin lispro (ADMELOG) corrective regimen sliding scale   SubCutaneous every 6 hours  methylPREDNISolone sodium succinate Injectable 40 milliGRAM(s) IV Push daily  midazolam Infusion 0.05 mG/kG/Hr IV Continuous <Continuous>  pantoprazole   Suspension 40 milliGRAM(s) Oral daily  polyethylene glycol 3350 17 Gram(s) Oral daily  propofol Infusion 30.007 MICROgram(s)/kG/Min IV Continuous <Continuous>  senna 2 Tablet(s) Oral at bedtime  sodium chloride 0.9% lock flush 10 milliLiter(s) IV Push every 1 hour PRN  sodium chloride 0.9%. 1000 milliLiter(s) IV Continuous <Continuous>    acetaminophen    Suspension .. 650 milliGRAM(s) Oral every 6 hours PRN  ALBUTerol    90 MICROgram(s) HFA Inhaler 2 Puff(s) Inhalation every 6 hours  ascorbic acid 1000 milliGRAM(s) Oral daily  aspirin  chewable 81 milliGRAM(s) Oral daily  bisacodyl Suppository 10 milliGRAM(s) Rectal daily  chlorhexidine 0.12% Liquid 15 milliLiter(s) Oral Mucosa two times a day  chlorhexidine 2% Cloths 1 Application(s) Topical <User Schedule>  enoxaparin Injectable 40 milliGRAM(s) SubCutaneous two times a day  fentaNYL   Infusion. 4 MICROgram(s)/kG/Hr IV Continuous <Continuous>  insulin lispro (ADMELOG) corrective regimen sliding scale   SubCutaneous every 6 hours  methylPREDNISolone sodium succinate Injectable 40 milliGRAM(s) IV Push daily  midazolam Infusion 0.05 mG/kG/Hr IV Continuous <Continuous>  pantoprazole   Suspension 40 milliGRAM(s) Oral daily  polyethylene glycol 3350 17 Gram(s) Oral daily  propofol Infusion 30.007 MICROgram(s)/kG/Min IV Continuous <Continuous>  senna 2 Tablet(s) Oral at bedtime  sodium chloride 0.9% lock flush 10 milliLiter(s) IV Push every 1 hour PRN  sodium chloride 0.9%. 1000 milliLiter(s) IV Continuous <Continuous>    Drug Dosing Weight  Height (cm): 167.6 (14 Mar 2021 12:03)  Weight (kg): 83.869 (14 Mar 2021 12:03)  BMI (kg/m2): 29.9 (14 Mar 2021 12:03)  BSA (m2): 1.93 (14 Mar 2021 12:03)    CENTRAL LINE: [x ] YES [ ] NO  LOCATION: [facility]   DATE INSERTED: 3/29  REMOVE: [ ] YES [ ] NO  EXPLAIN:    RIVERA: [x ] YES [ ] NO    DATE INSERTED:  REMOVE:  [ ] YES [ ] NO  EXPLAIN:    A-LINE:  [x ] YES [ ] NO  LOCATION: a  DATE INSERTED: 3/29  REMOVE:  [ ] YES [ ] NO  EXPLAIN:    PMH -reviewed admission note, no change since admission      ABG - ( 03 Apr 2021 04:21 )  pH, Arterial: 7.23  pH, Blood: x     /  pCO2: 66    /  pO2: 79    / HCO3: 27    / Base Excess: -1.9  /  SaO2: 94                    04-02 @ 07:01  -  04-03 @ 07:00  --------------------------------------------------------  IN: 1985.2 mL / OUT: 1055 mL / NET: 930.2 mL        Mode: AC/ CMV (Assist Control/ Continuous Mandatory Ventilation)  RR (machine): 24  TV (machine): 450  FiO2: 60  PEEP: 8  ITime: 0.8  MAP: 15  PIP: 37      PHYSICAL EXAM:    GENERAL: sedated and intubated  HEAD:  Atraumatic, Normocephalic  EYES: EOMI, PERRLA, conjunctiva and sclera clear  ENMT: No tonsillar erythema, exudates, or enlargement; blood at nostrils  NECK: Supple, normal appearance,   NERVOUS SYSTEM: sedated and intubated  CHEST/LUNG: Pt has breath sounds on both sides   HEART: s1,s2  No murmurs, rubs, or gallops  ABDOMEN: Soft, Nontender, Nondistended;  EXTREMITIES:  2+ Peripheral Pulses, No clubbing, cyanosis, or edema  LYMPH: No lymphadenopathy noted  SKIN: No rashes or lesions; Good capillary refill    LABS:  CBC Full  -  ( 03 Apr 2021 03:48 )  WBC Count : 5.45 K/uL  RBC Count : 3.94 M/uL  Hemoglobin : 12.8 g/dL  Hematocrit : 39.6 %  Platelet Count - Automated : 152 K/uL  Mean Cell Volume : 100.5 fl  Mean Cell Hemoglobin : 32.5 pg  Mean Cell Hemoglobin Concentration : 32.3 gm/dL  Auto Neutrophil # : x  Auto Lymphocyte # : x  Auto Monocyte # : x  Auto Eosinophil # : x  Auto Basophil # : x  Auto Neutrophil % : x  Auto Lymphocyte % : x  Auto Monocyte % : x  Auto Eosinophil % : x  Auto Basophil % : x    04-03    144  |  108  |  22<H>  ----------------------------<  112<H>  4.3   |  30  |  0.58    Ca    8.0<L>      03 Apr 2021 03:48  Phos  2.5     04-03  Mg     2.8     04-03    TPro  5.5<L>  /  Alb  1.5<L>  /  TBili  0.7  /  DBili  x   /  AST  40  /  ALT  82<H>  /  AlkPhos  101  04-03            RADIOLOGY & ADDITIONAL STUDIES REVIEWED:   [ ]GOALS OF CARE DISCUSSION WITH PATIENT/FAMILY/PROXY:    CRITICAL CARE TIME SPENT: 35 minutes normal (ped)... In no apparent distress, appears well developed and well nourished.

## 2021-04-05 LAB
BASE EXCESS BLDA CALC-SCNC: 8 MMOL/L — HIGH (ref -2–2)
BLOOD GAS COMMENTS ARTERIAL: SIGNIFICANT CHANGE UP
CRP SERPL-MCNC: 159 MG/L — HIGH
FERRITIN SERPL-MCNC: 1004 NG/ML — HIGH (ref 30–400)
GLUCOSE BLDC GLUCOMTR-MCNC: 127 MG/DL — HIGH (ref 70–99)
GLUCOSE BLDC GLUCOMTR-MCNC: 131 MG/DL — HIGH (ref 70–99)
GLUCOSE BLDC GLUCOMTR-MCNC: 141 MG/DL — HIGH (ref 70–99)
GLUCOSE BLDC GLUCOMTR-MCNC: 98 MG/DL — SIGNIFICANT CHANGE UP (ref 70–99)
HCO3 BLDA-SCNC: 36 MMOL/L — HIGH (ref 23–27)
HCT VFR BLD CALC: 29.7 % — LOW (ref 39–50)
HEPARIN-PF4 AB RESULT: <0.6 U/ML — SIGNIFICANT CHANGE UP (ref 0–0.9)
HGB BLD-MCNC: 9.2 G/DL — LOW (ref 13–17)
HOROWITZ INDEX BLDA+IHG-RTO: 70 — SIGNIFICANT CHANGE UP
MAGNESIUM SERPL-MCNC: 2.5 MG/DL — SIGNIFICANT CHANGE UP (ref 1.6–2.6)
MCHC RBC-ENTMCNC: 31 GM/DL — LOW (ref 32–36)
MCHC RBC-ENTMCNC: 31.5 PG — SIGNIFICANT CHANGE UP (ref 27–34)
MCV RBC AUTO: 101.7 FL — HIGH (ref 80–100)
NRBC # BLD: 0 /100 WBCS — SIGNIFICANT CHANGE UP (ref 0–0)
PCO2 BLDA: 65 MMHG — HIGH (ref 32–46)
PF4 HEPARIN CMPLX AB SER-ACNC: NEGATIVE — SIGNIFICANT CHANGE UP
PH BLDA: 7.36 — SIGNIFICANT CHANGE UP (ref 7.35–7.45)
PHOSPHATE SERPL-MCNC: 2.1 MG/DL — LOW (ref 2.5–4.5)
PLATELET # BLD AUTO: 120 K/UL — LOW (ref 150–400)
PO2 BLDA: 75 MMHG — SIGNIFICANT CHANGE UP (ref 74–108)
PROCALCITONIN SERPL-MCNC: 0.99 NG/ML — HIGH (ref 0.02–0.1)
RBC # BLD: 2.92 M/UL — LOW (ref 4.2–5.8)
RBC # FLD: 12.8 % — SIGNIFICANT CHANGE UP (ref 10.3–14.5)
SAO2 % BLDA: 94 % — SIGNIFICANT CHANGE UP (ref 92–96)
TRIGL SERPL-MCNC: 218 MG/DL — HIGH
WBC # BLD: 9.29 K/UL — SIGNIFICANT CHANGE UP (ref 3.8–10.5)
WBC # FLD AUTO: 9.29 K/UL — SIGNIFICANT CHANGE UP (ref 3.8–10.5)

## 2021-04-05 PROCEDURE — 71045 X-RAY EXAM CHEST 1 VIEW: CPT | Mod: 26

## 2021-04-05 RX ADMIN — Medication 1000 MILLIGRAM(S): at 11:17

## 2021-04-05 RX ADMIN — CHLORHEXIDINE GLUCONATE 15 MILLILITER(S): 213 SOLUTION TOPICAL at 06:02

## 2021-04-05 RX ADMIN — ENOXAPARIN SODIUM 40 MILLIGRAM(S): 100 INJECTION SUBCUTANEOUS at 06:02

## 2021-04-05 RX ADMIN — SENNA PLUS 2 TABLET(S): 8.6 TABLET ORAL at 22:33

## 2021-04-05 RX ADMIN — CHLORHEXIDINE GLUCONATE 15 MILLILITER(S): 213 SOLUTION TOPICAL at 09:42

## 2021-04-05 RX ADMIN — PANTOPRAZOLE SODIUM 40 MILLIGRAM(S): 20 TABLET, DELAYED RELEASE ORAL at 11:19

## 2021-04-05 RX ADMIN — ENOXAPARIN SODIUM 40 MILLIGRAM(S): 100 INJECTION SUBCUTANEOUS at 17:10

## 2021-04-05 RX ADMIN — PROPOFOL 15.1 MICROGRAM(S)/KG/MIN: 10 INJECTION, EMULSION INTRAVENOUS at 14:53

## 2021-04-05 RX ADMIN — CHLORHEXIDINE GLUCONATE 1 APPLICATION(S): 213 SOLUTION TOPICAL at 06:02

## 2021-04-05 RX ADMIN — PROPOFOL 15.1 MICROGRAM(S)/KG/MIN: 10 INJECTION, EMULSION INTRAVENOUS at 09:41

## 2021-04-05 RX ADMIN — FENTANYL CITRATE 33.5 MICROGRAM(S)/KG/HR: 50 INJECTION INTRAVENOUS at 09:41

## 2021-04-05 RX ADMIN — Medication 40 MILLIGRAM(S): at 06:02

## 2021-04-05 RX ADMIN — FENTANYL CITRATE 33.5 MICROGRAM(S)/KG/HR: 50 INJECTION INTRAVENOUS at 15:50

## 2021-04-05 RX ADMIN — Medication 81 MILLIGRAM(S): at 11:17

## 2021-04-05 RX ADMIN — ALBUTEROL 2 PUFF(S): 90 AEROSOL, METERED ORAL at 03:14

## 2021-04-05 RX ADMIN — ALBUTEROL 2 PUFF(S): 90 AEROSOL, METERED ORAL at 16:13

## 2021-04-05 RX ADMIN — ALBUTEROL 2 PUFF(S): 90 AEROSOL, METERED ORAL at 09:32

## 2021-04-05 NOTE — PROGRESS NOTE ADULT - SUBJECTIVE AND OBJECTIVE BOX
INTERVAL HPI/OVERNIGHT EVENTS: ***    PRESSORS: [ ] YES [ ] NO  WHICH:    ANTIBIOTICS:                  DATE STARTED:  ANTIBIOTICS:                  DATE STARTED:  ANTIBIOTICS:                  DATE STARTED:    Antimicrobial:    Cardiovascular:    Pulmonary:  ALBUTerol    90 MICROgram(s) HFA Inhaler 2 Puff(s) Inhalation every 6 hours    Hematalogic:  aspirin  chewable 81 milliGRAM(s) Oral daily  enoxaparin Injectable 40 milliGRAM(s) SubCutaneous two times a day    Other:  acetaminophen    Suspension .. 650 milliGRAM(s) Oral every 6 hours PRN  ascorbic acid 1000 milliGRAM(s) Oral daily  bisacodyl Suppository 10 milliGRAM(s) Rectal daily  chlorhexidine 0.12% Liquid 15 milliLiter(s) Oral Mucosa two times a day  chlorhexidine 2% Cloths 1 Application(s) Topical <User Schedule>  fentaNYL   Infusion. 4 MICROgram(s)/kG/Hr IV Continuous <Continuous>  insulin lispro (ADMELOG) corrective regimen sliding scale   SubCutaneous every 6 hours  methylPREDNISolone sodium succinate Injectable 40 milliGRAM(s) IV Push daily  midazolam Infusion 0.05 mG/kG/Hr IV Continuous <Continuous>  pantoprazole   Suspension 40 milliGRAM(s) Oral daily  polyethylene glycol 3350 17 Gram(s) Oral daily  propofol Infusion 30.007 MICROgram(s)/kG/Min IV Continuous <Continuous>  senna 2 Tablet(s) Oral at bedtime  sodium chloride 0.9% lock flush 10 milliLiter(s) IV Push every 1 hour PRN  sodium chloride 0.9%. 1000 milliLiter(s) IV Continuous <Continuous>    acetaminophen    Suspension .. 650 milliGRAM(s) Oral every 6 hours PRN  ALBUTerol    90 MICROgram(s) HFA Inhaler 2 Puff(s) Inhalation every 6 hours  ascorbic acid 1000 milliGRAM(s) Oral daily  aspirin  chewable 81 milliGRAM(s) Oral daily  bisacodyl Suppository 10 milliGRAM(s) Rectal daily  chlorhexidine 0.12% Liquid 15 milliLiter(s) Oral Mucosa two times a day  chlorhexidine 2% Cloths 1 Application(s) Topical <User Schedule>  enoxaparin Injectable 40 milliGRAM(s) SubCutaneous two times a day  fentaNYL   Infusion. 4 MICROgram(s)/kG/Hr IV Continuous <Continuous>  insulin lispro (ADMELOG) corrective regimen sliding scale   SubCutaneous every 6 hours  methylPREDNISolone sodium succinate Injectable 40 milliGRAM(s) IV Push daily  midazolam Infusion 0.05 mG/kG/Hr IV Continuous <Continuous>  pantoprazole   Suspension 40 milliGRAM(s) Oral daily  polyethylene glycol 3350 17 Gram(s) Oral daily  propofol Infusion 30.007 MICROgram(s)/kG/Min IV Continuous <Continuous>  senna 2 Tablet(s) Oral at bedtime  sodium chloride 0.9% lock flush 10 milliLiter(s) IV Push every 1 hour PRN  sodium chloride 0.9%. 1000 milliLiter(s) IV Continuous <Continuous>    Drug Dosing Weight  Height (cm): 167.6 (14 Mar 2021 12:03)  Weight (kg): 83.869 (14 Mar 2021 12:03)  BMI (kg/m2): 29.9 (14 Mar 2021 12:03)  BSA (m2): 1.93 (14 Mar 2021 12:03)    CENTRAL LINE: [ ] YES [ ] NO  LOCATION:   DATE INSERTED:  REMOVE: [ ] YES [ ] NO  EXPLAIN:    RIVERA: [ ] YES [ ] NO    DATE INSERTED:  REMOVE:  [ ] YES [ ] NO  EXPLAIN:    A-LINE:  [ ] YES [ ] NO  LOCATION:   DATE INSERTED:  REMOVE:  [ ] YES [ ] NO  EXPLAIN:    PMH -reviewed admission note, no change since admission  PAST MEDICAL & SURGICAL HISTORY:  No pertinent past medical history    S/P moody  1990s    S/P appendectomy  1990s        ICU Vital Signs Last 24 Hrs  T(C): 37.8 (05 Apr 2021 06:00), Max: 37.9 (04 Apr 2021 08:30)  T(F): 100.1 (05 Apr 2021 06:00), Max: 100.2 (04 Apr 2021 08:30)  HR: 83 (05 Apr 2021 08:00) (83 - 113)  BP: --  BP(mean): --  ABP: 123/56 (05 Apr 2021 08:00) (98/55 - 126/60)  ABP(mean): 75 (05 Apr 2021 08:00) (68 - 82)  RR: 24 (05 Apr 2021 08:00) (24 - 24)  SpO2: 95% (05 Apr 2021 08:00) (93% - 99%)      ABG - ( 05 Apr 2021 04:05 )  pH, Arterial: 7.36  pH, Blood: x     /  pCO2: 65    /  pO2: 75    / HCO3: 36    / Base Excess: 8.0   /  SaO2: 94                    04-04 @ 07:01  -  04-05 @ 07:00  --------------------------------------------------------  IN: 2091.4 mL / OUT: 1575 mL / NET: 516.4 mL        Mode: AC/ CMV (Assist Control/ Continuous Mandatory Ventilation)  RR (machine): 24  TV (machine): 450  FiO2: 70  PEEP: 8  ITime: 1  MAP: 14  PIP: 33      PHYSICAL EXAM:    GENERAL: [ ]NAD, [ ]well-groomed, [ ]well-developed  HEAD:  [ ]Atraumatic, [ ]Normocephalic  EYES: [ ]EOMI, [ ]PERRLA, [ ]conjunctiva and sclera clear  ENMT: [ ]No tonsillar erythema, exudates, or enlargement; [ ]Moist mucous membranes, [ ]Good dentition, [ ]No lesions  NECK: [ ]Supple, normal appearance, [ ]No JVD; [ ]Normal thyroid; [ ]Trachea midline  NERVOUS SYSTEM:  [ ]Alert & Oriented X3, [ ]Good concentration; [ ]Motor Strength 5/5 B/L upper and lower extremities; [ ]DTRs 2+ intact and symmetric  CHEST/LUNG: [ ]No chest deformity; [ ]Normal percussion bilaterally; [ ]No rales, rhonchi, wheezing   HEART: [ ]Regular rate and rhythm; [ ]No murmurs, rubs, or gallops  ABDOMEN: [ ]Soft, Nontender, Nondistended; [ ]Bowel sounds present  EXTREMITIES:  [ ]2+ Peripheral Pulses, [ ]No clubbing, cyanosis, or edema  LYMPH: [ ]No lymphadenopathy noted  SKIN: [ ]No rashes or lesions; [ ]Good capillary refill      LABS:  CBC Full  -  ( 05 Apr 2021 04:36 )  WBC Count : 9.29 K/uL  RBC Count : 2.92 M/uL  Hemoglobin : 9.2 g/dL  Hematocrit : 29.7 %  Platelet Count - Automated : 120 K/uL  Mean Cell Volume : 101.7 fl  Mean Cell Hemoglobin : 31.5 pg  Mean Cell Hemoglobin Concentration : 31.0 gm/dL  Auto Neutrophil # : x  Auto Lymphocyte # : x  Auto Monocyte # : x  Auto Eosinophil # : x  Auto Basophil # : x  Auto Neutrophil % : x  Auto Lymphocyte % : x  Auto Monocyte % : x  Auto Eosinophil % : x  Auto Basophil % : x    04-04    145  |  108  |  24<H>  ----------------------------<  121<H>  4.9   |  34<H>  |  0.52    Ca    7.9<L>      04 Apr 2021 03:57  Phos  2.1     04-05  Mg     2.5     04-05    TPro  5.0<L>  /  Alb  1.3<L>  /  TBili  0.4  /  DBili  x   /  AST  25  /  ALT  55  /  AlkPhos  76  04-04            RADIOLOGY & ADDITIONAL STUDIES REVIEWED:  ***    [ ]GOALS OF CARE DISCUSSION WITH PATIENT/FAMILY/PROXY:    CRITICAL CARE TIME SPENT: 35 minutes INTERVAL HPI/OVERNIGHT EVENTS: No acute event overnight     PRESSORS: [ ] YES [x ] NO  WHICH:    ANTIBIOTICS:                  DATE STARTED:  ANTIBIOTICS:                  DATE STARTED:  ANTIBIOTICS:                  DATE STARTED:    Antimicrobial:    Cardiovascular:    Pulmonary:  ALBUTerol    90 MICROgram(s) HFA Inhaler 2 Puff(s) Inhalation every 6 hours    Hematalogic:  aspirin  chewable 81 milliGRAM(s) Oral daily  enoxaparin Injectable 40 milliGRAM(s) SubCutaneous two times a day    Other:  acetaminophen    Suspension .. 650 milliGRAM(s) Oral every 6 hours PRN  ascorbic acid 1000 milliGRAM(s) Oral daily  bisacodyl Suppository 10 milliGRAM(s) Rectal daily  chlorhexidine 0.12% Liquid 15 milliLiter(s) Oral Mucosa two times a day  chlorhexidine 2% Cloths 1 Application(s) Topical <User Schedule>  fentaNYL   Infusion. 4 MICROgram(s)/kG/Hr IV Continuous <Continuous>  insulin lispro (ADMELOG) corrective regimen sliding scale   SubCutaneous every 6 hours  methylPREDNISolone sodium succinate Injectable 40 milliGRAM(s) IV Push daily  midazolam Infusion 0.05 mG/kG/Hr IV Continuous <Continuous>  pantoprazole   Suspension 40 milliGRAM(s) Oral daily  polyethylene glycol 3350 17 Gram(s) Oral daily  propofol Infusion 30.007 MICROgram(s)/kG/Min IV Continuous <Continuous>  senna 2 Tablet(s) Oral at bedtime  sodium chloride 0.9% lock flush 10 milliLiter(s) IV Push every 1 hour PRN  sodium chloride 0.9%. 1000 milliLiter(s) IV Continuous <Continuous>    acetaminophen    Suspension .. 650 milliGRAM(s) Oral every 6 hours PRN  ALBUTerol    90 MICROgram(s) HFA Inhaler 2 Puff(s) Inhalation every 6 hours  ascorbic acid 1000 milliGRAM(s) Oral daily  aspirin  chewable 81 milliGRAM(s) Oral daily  bisacodyl Suppository 10 milliGRAM(s) Rectal daily  chlorhexidine 0.12% Liquid 15 milliLiter(s) Oral Mucosa two times a day  chlorhexidine 2% Cloths 1 Application(s) Topical <User Schedule>  enoxaparin Injectable 40 milliGRAM(s) SubCutaneous two times a day  fentaNYL   Infusion. 4 MICROgram(s)/kG/Hr IV Continuous <Continuous>  insulin lispro (ADMELOG) corrective regimen sliding scale   SubCutaneous every 6 hours  methylPREDNISolone sodium succinate Injectable 40 milliGRAM(s) IV Push daily  midazolam Infusion 0.05 mG/kG/Hr IV Continuous <Continuous>  pantoprazole   Suspension 40 milliGRAM(s) Oral daily  polyethylene glycol 3350 17 Gram(s) Oral daily  propofol Infusion 30.007 MICROgram(s)/kG/Min IV Continuous <Continuous>  senna 2 Tablet(s) Oral at bedtime  sodium chloride 0.9% lock flush 10 milliLiter(s) IV Push every 1 hour PRN  sodium chloride 0.9%. 1000 milliLiter(s) IV Continuous <Continuous>    Drug Dosing Weight  Height (cm): 167.6 (14 Mar 2021 12:03)  Weight (kg): 83.869 (14 Mar 2021 12:03)  BMI (kg/m2): 29.9 (14 Mar 2021 12:03)  BSA (m2): 1.93 (14 Mar 2021 12:03)    CENTRAL LINE: [ x] YES [ ] NO  LOCATION: [facility]  DATE INSERTED: 3/29  REMOVE: [ ] YES [ ] NO  EXPLAIN:    RIVERA: [ x] YES [ ] NO    DATE INSERTED:  REMOVE:  [ ] YES [ ] NO  EXPLAIN:    A-LINE:  [x ] YES [ ] NO  LOCATION:   DATE INSERTED: 3/29  REMOVE:  [ ] YES [ ] NO  EXPLAIN:    PMH -reviewed admission note, no change since admission  PAST MEDICAL & SURGICAL HISTORY:  No pertinent past medical history    S/P moody  1990s    S/P appendectomy  1990s        ICU Vital Signs Last 24 Hrs  T(C): 37.8 (05 Apr 2021 06:00), Max: 37.9 (04 Apr 2021 08:30)  T(F): 100.1 (05 Apr 2021 06:00), Max: 100.2 (04 Apr 2021 08:30)  HR: 83 (05 Apr 2021 08:00) (83 - 113)  ABP: 123/56 (05 Apr 2021 08:00) (98/55 - 126/60)  ABP(mean): 75 (05 Apr 2021 08:00) (68 - 82)  RR: 24 (05 Apr 2021 08:00) (24 - 24)  SpO2: 95% (05 Apr 2021 08:00) (93% - 99%)      ABG - ( 05 Apr 2021 04:05 )  pH, Arterial: 7.36  pH, Blood: x     /  pCO2: 65    /  pO2: 75    / HCO3: 36    / Base Excess: 8.0   /  SaO2: 94                    04-04 @ 07:01  -  04-05 @ 07:00  --------------------------------------------------------  IN: 2091.4 mL / OUT: 1575 mL / NET: 516.4 mL        Mode: AC/ CMV (Assist Control/ Continuous Mandatory Ventilation)  RR (machine): 24  TV (machine): 450  FiO2: 70  PEEP: 8  ITime: 1  MAP: 14  PIP: 33      PHYSICAL EXAM:    GENERAL: sedated and intubated  HEAD:  Atraumatic, Normocephalic  EYES: EOMI, PERRLA, conjunctiva and sclera clear  ENMT: No tonsillar erythema, exudates, or enlargement; blood at nostrils  NECK: Supple, normal appearance,   NERVOUS SYSTEM: sedated and intubated  CHEST/LUNG: Pt has breath sounds on both sides   HEART: s1,s2  No murmurs, rubs, or gallops  ABDOMEN: Soft, Nontender, Nondistended;  EXTREMITIES:  2+ Peripheral Pulses, No clubbing, cyanosis, or edema  LYMPH: No lymphadenopathy noted  SKIN: No rashes or lesions; Good capillary refill    LABS:  CBC Full  -  ( 05 Apr 2021 04:36 )  WBC Count : 9.29 K/uL  RBC Count : 2.92 M/uL  Hemoglobin : 9.2 g/dL  Hematocrit : 29.7 %  Platelet Count - Automated : 120 K/uL  Mean Cell Volume : 101.7 fl  Mean Cell Hemoglobin : 31.5 pg  Mean Cell Hemoglobin Concentration : 31.0 gm/dL  Auto Neutrophil # : x  Auto Lymphocyte # : x  Auto Monocyte # : x  Auto Eosinophil # : x  Auto Basophil # : x  Auto Neutrophil % : x  Auto Lymphocyte % : x  Auto Monocyte % : x  Auto Eosinophil % : x  Auto Basophil % : x    04-04    145  |  108  |  24<H>  ----------------------------<  121<H>  4.9   |  34<H>  |  0.52    Ca    7.9<L>      04 Apr 2021 03:57  Phos  2.1     04-05  Mg     2.5     04-05    TPro  5.0<L>  /  Alb  1.3<L>  /  TBili  0.4  /  DBili  x   /  AST  25  /  ALT  55  /  AlkPhos  76  04-04            RADIOLOGY & ADDITIONAL STUDIES REVIEWED:  yes    [ ]GOALS OF CARE DISCUSSION WITH PATIENT/FAMILY/PROXY:    CRITICAL CARE TIME SPENT: 35 minutes

## 2021-04-05 NOTE — PROGRESS NOTE ADULT - SUBJECTIVE AND OBJECTIVE BOX
Patient is a 55y old  Male who presents with a chief complaint of SOB (04 Apr 2021 10:50)    pt seen in icu [ x ], reg med floor [   ], bed [ x ], chair at bedside [   ], a+o x3 [  ], sedated [x  ],  nad [x  ]    andrea [ x ], ngt [x  ], et tube [ x ], cent line [x  ],          Allergies    No Known Allergies        Vitals    T(F): 99.9 (04-05-21 @ 00:00), Max: 100.2 (04-04-21 @ 08:30)  HR: 90 (04-05-21 @ 04:14) (86 - 117)  BP: --  RR: 24 (04-05-21 @ 02:00) (24 - 24)  SpO2: 95% (04-05-21 @ 04:14) (93% - 99%)  Wt(kg): --  CAPILLARY BLOOD GLUCOSE      POCT Blood Glucose.: 141 mg/dL (05 Apr 2021 04:31)      Labs                          9.2    9.29  )-----------( 120      ( 05 Apr 2021 04:36 )             29.7       04-04    145  |  108  |  24<H>  ----------------------------<  121<H>  4.9   |  34<H>  |  0.52    Ca    7.9<L>      04 Apr 2021 03:57  Phos  2.1     04-05  Mg     2.5     04-05    TPro  5.0<L>  /  Alb  1.3<L>  /  TBili  0.4  /  DBili  x   /  AST  25  /  ALT  55  /  AlkPhos  76  04-04            .Urine Clean Catch (Midstream)  03-15 @ 00:52   No growth  --  --          Radiology Results      Meds    MEDICATIONS  (STANDING):  ALBUTerol    90 MICROgram(s) HFA Inhaler 2 Puff(s) Inhalation every 6 hours  ascorbic acid 1000 milliGRAM(s) Oral daily  aspirin  chewable 81 milliGRAM(s) Oral daily  bisacodyl Suppository 10 milliGRAM(s) Rectal daily  chlorhexidine 0.12% Liquid 15 milliLiter(s) Oral Mucosa two times a day  chlorhexidine 2% Cloths 1 Application(s) Topical <User Schedule>  enoxaparin Injectable 40 milliGRAM(s) SubCutaneous two times a day  fentaNYL   Infusion. 4 MICROgram(s)/kG/Hr (33.5 mL/Hr) IV Continuous <Continuous>  insulin lispro (ADMELOG) corrective regimen sliding scale   SubCutaneous every 6 hours  methylPREDNISolone sodium succinate Injectable 40 milliGRAM(s) IV Push daily  midazolam Infusion 0.05 mG/kG/Hr (4.19 mL/Hr) IV Continuous <Continuous>  pantoprazole   Suspension 40 milliGRAM(s) Oral daily  polyethylene glycol 3350 17 Gram(s) Oral daily  propofol Infusion 30.007 MICROgram(s)/kG/Min (15.1 mL/Hr) IV Continuous <Continuous>  senna 2 Tablet(s) Oral at bedtime  sodium chloride 0.9%. 1000 milliLiter(s) (75 mL/Hr) IV Continuous <Continuous>      MEDICATIONS  (PRN):  acetaminophen    Suspension .. 650 milliGRAM(s) Oral every 6 hours PRN Temp greater or equal to 38C (100.4F)  sodium chloride 0.9% lock flush 10 milliLiter(s) IV Push every 1 hour PRN Pre/post blood products, medications, blood draw, and to maintain line patency      Physical Exam    Neuro :  no focal deficits  Respiratory: CTA B/L  CV: RRR, S1S2, no murmurs,   Abdominal: Soft, NT, ND +BS,  Extremities: No edema, + peripheral pulses      ASSESSMENT    Hypoxemia 2nd to covid pna   transaminitis  prediabetes  h/o appendectomy  cholecystectomy        PLAN    contact and airborne isolation  d/c remdesevir given covid ab positive noted   completed dexamethasone   started pulse steroids for 3 days - 250mg solumedrol bid now tapered to 40mg daily  cont asa, vit c,    cont albuterol inhaler   pulm f/u  procalcitonin, D-dimer, crp, ldh, ferritin, lactate noted ,    tmx 100.7  cont tylenol prn,   cont robitussin prn   pt on fentanyl, midazolam and propofol drip  s/p intubation 3/29/21  O2 sat (92% - 97%) mech vent  O2 via mech vent   xray 3/19/21 with pneumomediastinum  rept cxr with New trace right apical pneumothorax. New mild left apical pneumothorax. Grossly stable small pneumomediastinum.  Soft tissue emphysema at the neck bases bilaterally. Grossly stable bilateral pulmonary infiltrates noted.   cxr 2/24 with No evidence of pneumothorax can be appreciated on the available image. This may be related to patient positioning. Evidence of pneumomediastinum and subcutaneous emphysema in the lower neck is again noted. There are patchy bibasilar infiltrates and elevated right hemidiaphragm noted.   cxr 3/29/21 with No significant change bilateral infiltrates. There is a small simple left apical pneumothorax. No significant pleural effusion. Bilateral subcutaneous emphysema similar to prior.   thoracic surg f/u   pt intubated 6AM 3/29/21, XRs on 7AM and 5PM with no obvious increase of ptx  no immediate need for CT at this time,   recommend close monitoring with serial CXRs   lispro ss   prognosis poor  cont current meds  cont mgmt as per icu       Patient is a 55y old  Male who presents with a chief complaint of SOB (04 Apr 2021 10:50)    pt seen in icu [ x ], reg med floor [   ], bed [ x ], chair at bedside [   ], a+o x3 [  ], sedated [x  ],  nad [x  ]    andrea [ x ], ngt [x  ], et tube [ x ], cent line [x  ],          Allergies    No Known Allergies        Vitals    T(F): 99.9 (04-05-21 @ 00:00), Max: 100.2 (04-04-21 @ 08:30)  HR: 90 (04-05-21 @ 04:14) (86 - 117)  BP: --  RR: 24 (04-05-21 @ 02:00) (24 - 24)  SpO2: 95% (04-05-21 @ 04:14) (93% - 99%)  Wt(kg): --  CAPILLARY BLOOD GLUCOSE      POCT Blood Glucose.: 141 mg/dL (05 Apr 2021 04:31)      Labs                          9.2    9.29  )-----------( 120      ( 05 Apr 2021 04:36 )             29.7       04-04    145  |  108  |  24<H>  ----------------------------<  121<H>  4.9   |  34<H>  |  0.52    Ca    7.9<L>      04 Apr 2021 03:57  Phos  2.1     04-05  Mg     2.5     04-05    TPro  5.0<L>  /  Alb  1.3<L>  /  TBili  0.4  /  DBili  x   /  AST  25  /  ALT  55  /  AlkPhos  76  04-04            .Urine Clean Catch (Midstream)  03-15 @ 00:52   No growth  --  --          Radiology Results    < from: Xray Chest 1 View- PORTABLE-Routine (Xray Chest 1 View- PORTABLE-Routine in AM.) (04.04.21 @ 10:03) >  FINDINGS:    Lines in place unchanged. Heart is mildly enlarged. Improving bilateral airspace disease. Degenerative changes.        IMPRESSION:    Improving bilateral airspace disease    < end of copied text >      Meds    MEDICATIONS  (STANDING):  ALBUTerol    90 MICROgram(s) HFA Inhaler 2 Puff(s) Inhalation every 6 hours  ascorbic acid 1000 milliGRAM(s) Oral daily  aspirin  chewable 81 milliGRAM(s) Oral daily  bisacodyl Suppository 10 milliGRAM(s) Rectal daily  chlorhexidine 0.12% Liquid 15 milliLiter(s) Oral Mucosa two times a day  chlorhexidine 2% Cloths 1 Application(s) Topical <User Schedule>  enoxaparin Injectable 40 milliGRAM(s) SubCutaneous two times a day  fentaNYL   Infusion. 4 MICROgram(s)/kG/Hr (33.5 mL/Hr) IV Continuous <Continuous>  insulin lispro (ADMELOG) corrective regimen sliding scale   SubCutaneous every 6 hours  methylPREDNISolone sodium succinate Injectable 40 milliGRAM(s) IV Push daily  midazolam Infusion 0.05 mG/kG/Hr (4.19 mL/Hr) IV Continuous <Continuous>  pantoprazole   Suspension 40 milliGRAM(s) Oral daily  polyethylene glycol 3350 17 Gram(s) Oral daily  propofol Infusion 30.007 MICROgram(s)/kG/Min (15.1 mL/Hr) IV Continuous <Continuous>  senna 2 Tablet(s) Oral at bedtime  sodium chloride 0.9%. 1000 milliLiter(s) (75 mL/Hr) IV Continuous <Continuous>      MEDICATIONS  (PRN):  acetaminophen    Suspension .. 650 milliGRAM(s) Oral every 6 hours PRN Temp greater or equal to 38C (100.4F)  sodium chloride 0.9% lock flush 10 milliLiter(s) IV Push every 1 hour PRN Pre/post blood products, medications, blood draw, and to maintain line patency      Physical Exam    Neuro :  no focal deficits  Respiratory: CTA B/L  CV: RRR, S1S2, no murmurs,   Abdominal: Soft, NT, ND +BS,  Extremities: No edema, + peripheral pulses      ASSESSMENT    Hypoxemia 2nd to covid pna   transaminitis  prediabetes  h/o appendectomy  cholecystectomy        PLAN    contact and airborne isolation  d/c remdesevir given covid ab positive noted   completed dexamethasone   started pulse steroids for 3 days - 250mg solumedrol bid now tapered to 40mg daily  cont asa, vit c,    cont albuterol inhaler   pulm f/u  procalcitonin, D-dimer, crp, ldh, ferritin, lactate noted ,    tmx 100.2  cont tylenol prn,   cont robitussin prn   pt on fentanyl, midazolam and propofol drip  s/p intubation 3/29/21  O2 sat (93% - 99%) mech vent  O2 via mech vent and taper fio2 as tolerated   xray 3/19/21 with pneumomediastinum  rept cxr with New trace right apical pneumothorax. New mild left apical pneumothorax. Grossly stable small pneumomediastinum.  Soft tissue emphysema at the neck bases bilaterally. Grossly stable bilateral pulmonary infiltrates noted.   cxr 2/24 with No evidence of pneumothorax can be appreciated on the available image. This may be related to patient positioning. Evidence of pneumomediastinum and subcutaneous emphysema in the lower neck is again noted. There are patchy bibasilar infiltrates and elevated right hemidiaphragm noted.   cxr 3/29/21 with No significant change bilateral infiltrates. There is a small simple left apical pneumothorax. No significant pleural effusion. Bilateral subcutaneous emphysema similar to prior.   thoracic surg f/u   pt intubated 6AM 3/29/21, XRs on 7AM and 5PM with no obvious increase of ptx  no immediate need for CT at this time,   recommend close monitoring with serial CXRs   cxr 4/4/21 with Improving bilateral airspace disease noted above  lispro ss   prognosis poor  cont current meds  cont mgmt as per icu

## 2021-04-05 NOTE — PROGRESS NOTE ADULT - SUBJECTIVE AND OBJECTIVE BOX
Patient is a 55y old  Male who presents with a chief complaint of SOB (05 Apr 2021 08:25)  Patient is sedated, intubated, laying in bed in NAD. Currently on 70% FIO2    INTERVAL HPI/OVERNIGHT EVENTS:      VITAL SIGNS:  T(F): 100.1 (04-05-21 @ 06:00)  HR: 85 (04-05-21 @ 08:15)  BP: --  RR: 24 (04-05-21 @ 08:00)  SpO2: 95% (04-05-21 @ 08:15)  Wt(kg): --  I&O's Detail    04 Apr 2021 07:01  -  05 Apr 2021 07:00  --------------------------------------------------------  IN:    FentaNYL: 739.2 mL    Glucerna 1.5: 550 mL    IV PiggyBack: 250 mL    Midazolam: 110 mL    Propofol: 442.2 mL  Total IN: 2091.4 mL    OUT:    Indwelling Catheter - Urethral (mL): 1550 mL    Nasogastric/Oral tube (mL): 0 mL    Rectal Tube (mL): 25 mL  Total OUT: 1575 mL    Total NET: 516.4 mL      05 Apr 2021 07:01  -  05 Apr 2021 09:23  --------------------------------------------------------  IN:    Enteral Tube Flush: 50 mL    FentaNYL: 66 mL    Glucerna 1.5: 50 mL    Propofol: 40 mL  Total IN: 206 mL    OUT:    Indwelling Catheter - Urethral (mL): 30 mL  Total OUT: 30 mL    Total NET: 176 mL        Mode: AC/ CMV (Assist Control/ Continuous Mandatory Ventilation)  RR (machine): 24  TV (machine): 450  FiO2: 70  PEEP: 8  ITime: 0.8  MAP: 14  PIP: 32        REVIEW OF SYSTEMS:    CONSTITUTIONAL:  No fevers, chills, sweats    HEENT:  Eyes:  No diplopia or blurred vision. ENT:  No earache, sore throat or runny nose.    CARDIOVASCULAR:  No pressure, squeezing, tightness, or heaviness about the chest; no palpitations.    RESPIRATORY:  Per HPI    GASTROINTESTINAL:  No abdominal pain, nausea, vomiting or diarrhea.    GENITOURINARY:  No dysuria, frequency or urgency.    NEUROLOGIC:  No paresthesias, fasciculations, seizures or weakness.    PSYCHIATRIC:  No disorder of thought or mood.      PHYSICAL EXAM:    Constitutional: Well developed and nourished  Eyes:Perrla  ENMT: normal  Neck:supple  Respiratory: good air entry  Cardiovascular: S1 S2 regular  Gastrointestinal: Soft, Non tender  Extremities: No edema  Vascular:normal  Neurological:Awake, alert,Ox3  Musculoskeletal:Normal      MEDICATIONS  (STANDING):  ALBUTerol    90 MICROgram(s) HFA Inhaler 2 Puff(s) Inhalation every 6 hours  ascorbic acid 1000 milliGRAM(s) Oral daily  aspirin  chewable 81 milliGRAM(s) Oral daily  bisacodyl Suppository 10 milliGRAM(s) Rectal daily  chlorhexidine 0.12% Liquid 15 milliLiter(s) Oral Mucosa two times a day  chlorhexidine 2% Cloths 1 Application(s) Topical <User Schedule>  enoxaparin Injectable 40 milliGRAM(s) SubCutaneous two times a day  fentaNYL   Infusion. 4 MICROgram(s)/kG/Hr (33.5 mL/Hr) IV Continuous <Continuous>  insulin lispro (ADMELOG) corrective regimen sliding scale   SubCutaneous every 6 hours  methylPREDNISolone sodium succinate Injectable 40 milliGRAM(s) IV Push daily  midazolam Infusion 0.05 mG/kG/Hr (4.19 mL/Hr) IV Continuous <Continuous>  pantoprazole   Suspension 40 milliGRAM(s) Oral daily  polyethylene glycol 3350 17 Gram(s) Oral daily  propofol Infusion 30.007 MICROgram(s)/kG/Min (15.1 mL/Hr) IV Continuous <Continuous>  senna 2 Tablet(s) Oral at bedtime  sodium chloride 0.9%. 1000 milliLiter(s) (75 mL/Hr) IV Continuous <Continuous>    MEDICATIONS  (PRN):  acetaminophen    Suspension .. 650 milliGRAM(s) Oral every 6 hours PRN Temp greater or equal to 38C (100.4F)  sodium chloride 0.9% lock flush 10 milliLiter(s) IV Push every 1 hour PRN Pre/post blood products, medications, blood draw, and to maintain line patency      Allergies    No Known Allergies    Intolerances        LABS:                        9.2    9.29  )-----------( 120      ( 05 Apr 2021 04:36 )             29.7     04-04    145  |  108  |  24<H>  ----------------------------<  121<H>  4.9   |  34<H>  |  0.52    Ca    7.9<L>      04 Apr 2021 03:57  Phos  2.1     04-05  Mg     2.5     04-05    TPro  5.0<L>  /  Alb  1.3<L>  /  TBili  0.4  /  DBili  x   /  AST  25  /  ALT  55  /  AlkPhos  76  04-04        ABG - ( 05 Apr 2021 04:05 )  pH, Arterial: 7.36  pH, Blood: x     /  pCO2: 65    /  pO2: 75    / HCO3: 36    / Base Excess: 8.0   /  SaO2: 94                    CAPILLARY BLOOD GLUCOSE      POCT Blood Glucose.: 141 mg/dL (05 Apr 2021 04:31)  POCT Blood Glucose.: 150 mg/dL (04 Apr 2021 22:42)  POCT Blood Glucose.: 141 mg/dL (04 Apr 2021 16:47)  POCT Blood Glucose.: 160 mg/dL (04 Apr 2021 10:38)    pro-bnp -- 04-03 @ 03:48     d-dimer 1250  04-03 @ 03:48  pro-bnp -- 03-31 @ 04:14     d-dimer 386  03-31 @ 04:14      RADIOLOGY & ADDITIONAL TESTS:    CXR:  < from: Xray Chest 1 View- PORTABLE-Routine (Xray Chest 1 View- PORTABLE-Routine in AM.) (04.04.21 @ 10:03) >  Improving bilateral airspace disease    < end of copied text >    Ct scan chest:    ekg;    echo:

## 2021-04-05 NOTE — PHYSICAL THERAPY INITIAL EVALUATION ADULT - DIAGNOSIS, PT EVAL
impaired strength, ROM, aerobic capacity. w/ Additional diagnoses pending further assessment of Pt. functional mobility and ability to participate

## 2021-04-05 NOTE — PHYSICAL THERAPY INITIAL EVALUATION ADULT - IMPAIRMENTS FOUND, PT EVAL
aerobic capacity/endurance/arousal, attention, and cognition/gait, locomotion, and balance/gross motor/muscle strength/poor safety awareness/ROM/ventilation and respiration/gas exchange

## 2021-04-05 NOTE — PROGRESS NOTE ADULT - ASSESSMENT
ASSESSMENT AND PLAN:  55M, no PMH, PSH appy and moody 1990s presented 3/14 with x9 days worsening cough, subjective fevers, and SOB, with x2-3 days dysuria and central, non-radiating, constant CP. Admitted to [facility] for acute hypoxic respiratory failure s/t covid pneumonia, ICU consulted for increasing work of breathing on 15 lpm NRM.     1. Acute hypoxic respiratory failure  2. Covid pneumonia  3. Transaminitis  4. Prediabetes  5. Abnormal TSH    =================== Neuro============================  Alert and oriented x 3 at baseline     intubated and sedated 3/29      ================= Cardiovascular==========================  Hypertension  not on any pressor support, maintaining map above 60    TACHYCARDIA:  pT HR in 80s   will keep monitoring         ================- Pulm=================================  Acute hypoxic respiratory failure: secondary to covid pneumonia  -was on  HFNr then got intubated 3/29  -D-dimer initially elevated on presentation, now wnl  -Remdesivir was discontinued due to positive antibodies   - add albuterol prn   -procalcitonin wnl, d-dimer in 200s  -started pulse steroids for 3 days - 250mg solumedrol bid- completed3/27  - solumedrol 40 once daily    #Pneumothorax and pneumomediastinum:  xray chest: tiny left apical pneumothorax  thoracic surgery consulted recommended no intervention at this time, just observation for now  xray monitoring daily    ==================ID===================================  Covid pneumonia  - leukocytosis resolved  -plan as above     ================= Nephro================================  -No issues   -monitor lytes, SCr   -monitor I/Os    =================GI====================================  Transaminitis:   likely secondary to covid   - and  on presentation  -Improved  -continue to monitor,  -  hepatitis panel -ve    diet:   ng tube and tube feed  bowel regimen    ================ Heme==================================  Elevated d-dimer: likely secondary to covid  -d-dimer 423 on presentation, now wnl  -continue prophylactic Lovenox 40 mg bid    =================Endocrine===============================  Prediabetes:    -a1c  5.8  -BS controlled  -continue HSS  -monitor FS while on steroids    Abnormal TSH:  -TSH level noted 0.26,   -TSH 0.37 and   - Free T4 1.82      ================= Skin/Catheters============================  No rashes. Peripheral IV lines.   central line, a-line, ng tube present 3/29,     - =================Prophylaxis =============================  Lovenox for DVT proph  Protonix for  GI proph    ==================GOC==================================   FULL CODE    spoke to brother with [employer] interpret[employer] and updated about pt condition.  ASSESSMENT AND PLAN:  55M, no PMH, PSH appy and moody 1990s presented 3/14 with x9 days worsening cough, subjective fevers, and SOB, with x2-3 days dysuria and central, non-radiating, constant CP. Admitted to [facility] for acute hypoxic respiratory failure s/t covid pneumonia, ICU consulted for increasing work of breathing on 15 lpm NRM.     1. Acute hypoxic respiratory failure  2. Covid pneumonia  3. Transaminitis  4. Prediabetes  5. Abnormal TSH    =================== Neuro============================  Alert and oriented x 3 at baseline     intubated and sedated 3/29   Propofol and fentanyl, Off Versed since 11 Am       ================= Cardiovascular==========================  Hypertension  not on any pressor support, maintaining map above 60    TACHYCARDIA:  pT HR in 80s   will keep monitoring         ================- Pulm=================================  Acute hypoxic respiratory failure: secondary to covid pneumonia  -was on  HFNr then got intubated 3/29  -D-dimer initially elevated on presentation, now wnl  -Remdesivir was discontinued due to positive antibodies   - add albuterol prn   -procalcitonin wnl, d-dimer in 200s  -started pulse steroids for 3 days - 250mg solumedrol bid- completed3/27  - solumedrol 40 once daily    #Pneumothorax and pneumomediastinum:  X-ray chest: tiny left apical pneumothorax  Thoracic surgery consulted recommended no intervention at this time, just observation for now  X-ray monitoring daily    ==================ID===================================  Covid pneumonia  - leukocytosis resolved  -plan as above     ================= Nephro================================  -No issues   -monitor lytes, SCr   -monitor I/Os    =================GI====================================  Transaminitis:   likely secondary to covid   - and  on presentation  -Improved  -continue to monitor,  -  hepatitis panel -ve    diet:   ng tube and tube feed  bowel regimen, No BM  Will give Mineral oil enema     ================ Heme==================================  Elevated d-dimer: likely secondary to covid  -d-dimer 423 on presentation, now wnl  -continue prophylactic Lovenox 40 mg bid    =================Endocrine===============================  Prediabetes:    -a1c  5.8  -BS controlled  -continue HSS  -monitor FS while on steroids    Abnormal TSH:  -TSH level noted 0.26,   -TSH 0.37 and   - Free T4 1.82      ================= Skin/Catheters============================  No rashes. Peripheral IV lines.   central line, a-line, ng tube present 3/29, Will change today or tomorrow     - =================Prophylaxis =============================  Lovenox for DVT proph  Protonix for  GI proph    ==================GOC==================================   FULL CODE    Spoke with patient/s wife Rosa and grand daughter, she wants her son to be the POC   ASSESSMENT AND PLAN:  55M, no PMH, PSH appy and moody 1990s presented 3/14 with x9 days worsening cough, subjective fevers, and SOB, with x2-3 days dysuria and central, non-radiating, constant CP. Admitted to [facility] for acute hypoxic respiratory failure s/t covid pneumonia, ICU consulted for increasing work of breathing on 15 lpm NRM.     1. Acute hypoxic respiratory failure  2. Covid pneumonia  3. Transaminitis  4. Prediabetes  5. Abnormal TSH    =================== Neuro============================  Alert and oriented x 3 at baseline     intubated and sedated 3/29   Propofol and fentanyl, Off Versed since 11 Am       ================= Cardiovascular==========================  Hypertension  not on any pressor support, maintaining map above 60    TACHYCARDIA:  pT HR in 80s   will keep monitoring         ================- Pulm=================================  Acute hypoxic respiratory failure: secondary to covid pneumonia  -was on  HFNr then got intubated 3/29  -D-dimer initially elevated on presentation, now wnl  -Remdesivir was discontinued due to positive antibodies   - add albuterol prn   -procalcitonin wnl, d-dimer in 200s  -started pulse steroids for 3 days - 250mg solumedrol bid- completed3/27  - solumedrol 40 once daily    #Pneumothorax and pneumomediastinum:  X-ray chest: tiny left apical pneumothorax  Thoracic surgery consulted recommended no intervention at this time, just observation for now  X-ray monitoring daily    ==================ID===================================  Covid pneumonia  - leukocytosis resolved  -plan as above     ================= Nephro================================  -No issues   -monitor lytes, SCr   -monitor I/Os    =================GI====================================  Transaminitis:   likely secondary to covid   - and  on presentation  -Improved  -continue to monitor,  -  hepatitis panel -ve    diet:   ng tube and tube feed  bowel regimen,   Was having loose stools,     ================ Heme==================================  Elevated d-dimer: likely secondary to Covid  -d-dimer 423 on presentation, now wnl  -continue prophylactic Lovenox 40 mg bid    =================Endocrine===============================  Prediabetes:    -A1c  5.8  -BS controlled  -continue HSS  -monitor FS while on steroids    Abnormal TSH:  -TSH level noted 0.26,   -TSH 0.37 and   - Free T4 1.82      ================= Skin/Catheters============================  No rashes. Peripheral IV lines.   central line, a-line, ng tube present 3/29, Will change today or tomorrow     - =================Prophylaxis =============================  Lovenox for DVT proph  Protonix for  GI proph    ==================GOC==================================   FULL CODE    Spoke with patient/s wife Rosa and grand daughter, she wants her son to be the POC

## 2021-04-05 NOTE — PHYSICAL THERAPY INITIAL EVALUATION ADULT - GENERAL OBSERVATIONS, REHAB EVAL
Patient received supine in bed, NAD+ Cardiac monitoring, NG, andrea, Rt. neck triple lumen RUE a-line. RASS -5; CAM ICU (Unable to assess) Patient received supine in bed, NAD + ETT, Cardiac monitoring, NG, andrea, Rt. neck triple lumen RUE a-line. RASS -5; CAM ICU (Unable to assess)

## 2021-04-05 NOTE — PHYSICAL THERAPY INITIAL EVALUATION ADULT - DISCHARGE DISPOSITION, PT EVAL
Inpatient Rehabilitation Facility: Precise Care Level TBD. (IR[facility])    Pt. remains highly medically acute at this time, and precise level of care at discharge is not yet reliably determinable. However, given prior level of function and anticipated impairments due to illness, pt. is likely to require some level of INPATIENT services at discharge. As pts. medical status and functional abilities improve, recommendation will be updated to reflect appropriate care level for pt. rehabilitation.

## 2021-04-06 LAB
ALBUMIN SERPL ELPH-MCNC: 1.5 G/DL — LOW (ref 3.5–5)
ALP SERPL-CCNC: 97 U/L — SIGNIFICANT CHANGE UP (ref 40–120)
ALT FLD-CCNC: 62 U/L DA — HIGH (ref 10–60)
ANION GAP SERPL CALC-SCNC: 4 MMOL/L — LOW (ref 5–17)
APTT BLD: 29.9 SEC — SIGNIFICANT CHANGE UP (ref 27.5–35.5)
AST SERPL-CCNC: 39 U/L — SIGNIFICANT CHANGE UP (ref 10–40)
BASE EXCESS BLDA CALC-SCNC: 9 MMOL/L — HIGH (ref -2–2)
BASE EXCESS BLDV CALC-SCNC: 7.9 MMOL/L — SIGNIFICANT CHANGE UP
BASOPHILS # BLD AUTO: 0 K/UL — SIGNIFICANT CHANGE UP (ref 0–0.2)
BASOPHILS NFR BLD AUTO: 0 % — SIGNIFICANT CHANGE UP (ref 0–2)
BILIRUB SERPL-MCNC: 0.4 MG/DL — SIGNIFICANT CHANGE UP (ref 0.2–1.2)
BLOOD GAS COMMENTS ARTERIAL: SIGNIFICANT CHANGE UP
BLOOD GAS COMMENTS, VENOUS: SIGNIFICANT CHANGE UP
BUN SERPL-MCNC: 31 MG/DL — HIGH (ref 7–18)
CALCIUM SERPL-MCNC: 8 MG/DL — LOW (ref 8.4–10.5)
CHLORIDE SERPL-SCNC: 106 MMOL/L — SIGNIFICANT CHANGE UP (ref 96–108)
CO2 SERPL-SCNC: 35 MMOL/L — HIGH (ref 22–31)
CREAT SERPL-MCNC: 0.37 MG/DL — LOW (ref 0.5–1.3)
CRP SERPL-MCNC: 72 MG/L — HIGH
D DIMER BLD IA.RAPID-MCNC: 363 NG/ML DDU — HIGH
EOSINOPHIL # BLD AUTO: 0.23 K/UL — SIGNIFICANT CHANGE UP (ref 0–0.5)
EOSINOPHIL NFR BLD AUTO: 2 % — SIGNIFICANT CHANGE UP (ref 0–6)
FERRITIN SERPL-MCNC: 1192 NG/ML — HIGH (ref 30–400)
FERRITIN SERPL-MCNC: 1200 NG/ML — HIGH (ref 30–400)
GLUCOSE BLDC GLUCOMTR-MCNC: 106 MG/DL — HIGH (ref 70–99)
GLUCOSE BLDC GLUCOMTR-MCNC: 116 MG/DL — HIGH (ref 70–99)
GLUCOSE BLDC GLUCOMTR-MCNC: 96 MG/DL — SIGNIFICANT CHANGE UP (ref 70–99)
GLUCOSE BLDC GLUCOMTR-MCNC: 97 MG/DL — SIGNIFICANT CHANGE UP (ref 70–99)
GLUCOSE SERPL-MCNC: 100 MG/DL — HIGH (ref 70–99)
HCO3 BLDA-SCNC: 35 MMOL/L — HIGH (ref 23–27)
HCO3 BLDV-SCNC: 38 MMOL/L — HIGH (ref 22–29)
HCT VFR BLD CALC: 33.9 % — LOW (ref 39–50)
HGB BLD-MCNC: 10.9 G/DL — LOW (ref 13–17)
HOROWITZ INDEX BLDA+IHG-RTO: 60 — SIGNIFICANT CHANGE UP
HYPOSEGMENTATION: PRESENT — SIGNIFICANT CHANGE UP
INR BLD: 1.12 RATIO — SIGNIFICANT CHANGE UP (ref 0.88–1.16)
LYMPHOCYTES # BLD AUTO: 1.04 K/UL — SIGNIFICANT CHANGE UP (ref 1–3.3)
LYMPHOCYTES # BLD AUTO: 9 % — LOW (ref 13–44)
MAGNESIUM SERPL-MCNC: 2.1 MG/DL — SIGNIFICANT CHANGE UP (ref 1.6–2.6)
MANUAL SMEAR VERIFICATION: SIGNIFICANT CHANGE UP
MCHC RBC-ENTMCNC: 31.9 PG — SIGNIFICANT CHANGE UP (ref 27–34)
MCHC RBC-ENTMCNC: 32.2 GM/DL — SIGNIFICANT CHANGE UP (ref 32–36)
MCV RBC AUTO: 99.1 FL — SIGNIFICANT CHANGE UP (ref 80–100)
METAMYELOCYTES # FLD: 2 % — HIGH (ref 0–0)
MONOCYTES # BLD AUTO: 0.12 K/UL — SIGNIFICANT CHANGE UP (ref 0–0.9)
MONOCYTES NFR BLD AUTO: 1 % — LOW (ref 2–14)
MYELOCYTES NFR BLD: 3 % — HIGH (ref 0–0)
NEUTROPHILS # BLD AUTO: 9.57 K/UL — HIGH (ref 1.8–7.4)
NEUTROPHILS NFR BLD AUTO: 79 % — HIGH (ref 43–77)
NEUTS BAND # BLD: 4 % — SIGNIFICANT CHANGE UP (ref 0–8)
NRBC # BLD: 0 /100 — SIGNIFICANT CHANGE UP (ref 0–0)
PCO2 BLDA: 56 MMHG — HIGH (ref 32–46)
PCO2 BLDV: 83 MMHG — HIGH (ref 42–55)
PH BLDA: 7.42 — SIGNIFICANT CHANGE UP (ref 7.35–7.45)
PH BLDV: 7.27 — LOW (ref 7.32–7.43)
PHOSPHATE SERPL-MCNC: 1.8 MG/DL — LOW (ref 2.5–4.5)
PLAT MORPH BLD: NORMAL — SIGNIFICANT CHANGE UP
PLATELET # BLD AUTO: 145 K/UL — LOW (ref 150–400)
PLATELET COUNT - ESTIMATE: ABNORMAL
PO2 BLDA: 57 MMHG — LOW (ref 74–108)
PO2 BLDV: 65 MMHG — SIGNIFICANT CHANGE UP
POLYCHROMASIA BLD QL SMEAR: SLIGHT — SIGNIFICANT CHANGE UP
POTASSIUM SERPL-MCNC: 4.1 MMOL/L — SIGNIFICANT CHANGE UP (ref 3.5–5.3)
POTASSIUM SERPL-SCNC: 4.1 MMOL/L — SIGNIFICANT CHANGE UP (ref 3.5–5.3)
PROCALCITONIN SERPL-MCNC: 0.66 NG/ML — HIGH (ref 0.02–0.1)
PROT SERPL-MCNC: 5.4 G/DL — LOW (ref 6–8.3)
PROTHROM AB SERPL-ACNC: 13.2 SEC — SIGNIFICANT CHANGE UP (ref 10.6–13.6)
RBC # BLD: 3.42 M/UL — LOW (ref 4.2–5.8)
RBC # FLD: 12.9 % — SIGNIFICANT CHANGE UP (ref 10.3–14.5)
RBC BLD AUTO: ABNORMAL
SAO2 % BLDA: 89 % — LOW (ref 92–96)
SAO2 % BLDV: 94.2 % — SIGNIFICANT CHANGE UP
SODIUM SERPL-SCNC: 145 MMOL/L — SIGNIFICANT CHANGE UP (ref 135–145)
SRA INTERP SER-IMP: SIGNIFICANT CHANGE UP
WBC # BLD: 11.53 K/UL — HIGH (ref 3.8–10.5)
WBC # FLD AUTO: 11.53 K/UL — HIGH (ref 3.8–10.5)

## 2021-04-06 PROCEDURE — 71045 X-RAY EXAM CHEST 1 VIEW: CPT | Mod: 26

## 2021-04-06 RX ORDER — HYDROCHLOROTHIAZIDE 25 MG
25 TABLET ORAL DAILY
Refills: 0 | Status: DISCONTINUED | OUTPATIENT
Start: 2021-04-06 | End: 2021-04-07

## 2021-04-06 RX ORDER — POTASSIUM PHOSPHATE, MONOBASIC POTASSIUM PHOSPHATE, DIBASIC 236; 224 MG/ML; MG/ML
30 INJECTION, SOLUTION INTRAVENOUS ONCE
Refills: 0 | Status: COMPLETED | OUTPATIENT
Start: 2021-04-06 | End: 2021-04-06

## 2021-04-06 RX ORDER — METOPROLOL TARTRATE 50 MG
5 TABLET ORAL ONCE
Refills: 0 | Status: COMPLETED | OUTPATIENT
Start: 2021-04-06 | End: 2021-04-06

## 2021-04-06 RX ORDER — HYDROMORPHONE HYDROCHLORIDE 2 MG/ML
4 INJECTION INTRAMUSCULAR; INTRAVENOUS; SUBCUTANEOUS ONCE
Refills: 0 | Status: DISCONTINUED | OUTPATIENT
Start: 2021-04-06 | End: 2021-04-06

## 2021-04-06 RX ORDER — LABETALOL HCL 100 MG
20 TABLET ORAL ONCE
Refills: 0 | Status: COMPLETED | OUTPATIENT
Start: 2021-04-06 | End: 2021-04-06

## 2021-04-06 RX ORDER — MIDAZOLAM HYDROCHLORIDE 1 MG/ML
4 INJECTION, SOLUTION INTRAMUSCULAR; INTRAVENOUS ONCE
Refills: 0 | Status: DISCONTINUED | OUTPATIENT
Start: 2021-04-06 | End: 2021-04-06

## 2021-04-06 RX ORDER — MIDAZOLAM HYDROCHLORIDE 1 MG/ML
0.02 INJECTION, SOLUTION INTRAMUSCULAR; INTRAVENOUS
Qty: 100 | Refills: 0 | Status: DISCONTINUED | OUTPATIENT
Start: 2021-04-06 | End: 2021-04-10

## 2021-04-06 RX ADMIN — ENOXAPARIN SODIUM 40 MILLIGRAM(S): 100 INJECTION SUBCUTANEOUS at 17:40

## 2021-04-06 RX ADMIN — Medication 81 MILLIGRAM(S): at 11:46

## 2021-04-06 RX ADMIN — FENTANYL CITRATE 33.5 MICROGRAM(S)/KG/HR: 50 INJECTION INTRAVENOUS at 23:15

## 2021-04-06 RX ADMIN — HYDROMORPHONE HYDROCHLORIDE 4 MILLIGRAM(S): 2 INJECTION INTRAMUSCULAR; INTRAVENOUS; SUBCUTANEOUS at 08:30

## 2021-04-06 RX ADMIN — HYDROMORPHONE HYDROCHLORIDE 4 MILLIGRAM(S): 2 INJECTION INTRAMUSCULAR; INTRAVENOUS; SUBCUTANEOUS at 08:42

## 2021-04-06 RX ADMIN — PANTOPRAZOLE SODIUM 40 MILLIGRAM(S): 20 TABLET, DELAYED RELEASE ORAL at 11:46

## 2021-04-06 RX ADMIN — CHLORHEXIDINE GLUCONATE 1 APPLICATION(S): 213 SOLUTION TOPICAL at 05:24

## 2021-04-06 RX ADMIN — FENTANYL CITRATE 33.5 MICROGRAM(S)/KG/HR: 50 INJECTION INTRAVENOUS at 10:47

## 2021-04-06 RX ADMIN — SENNA PLUS 2 TABLET(S): 8.6 TABLET ORAL at 22:27

## 2021-04-06 RX ADMIN — ENOXAPARIN SODIUM 40 MILLIGRAM(S): 100 INJECTION SUBCUTANEOUS at 05:08

## 2021-04-06 RX ADMIN — Medication 10 MILLIGRAM(S): at 11:47

## 2021-04-06 RX ADMIN — MIDAZOLAM HYDROCHLORIDE 1.68 MG/KG/HR: 1 INJECTION, SOLUTION INTRAMUSCULAR; INTRAVENOUS at 08:37

## 2021-04-06 RX ADMIN — POLYETHYLENE GLYCOL 3350 17 GRAM(S): 17 POWDER, FOR SOLUTION ORAL at 11:46

## 2021-04-06 RX ADMIN — ALBUTEROL 2 PUFF(S): 90 AEROSOL, METERED ORAL at 15:27

## 2021-04-06 RX ADMIN — ALBUTEROL 2 PUFF(S): 90 AEROSOL, METERED ORAL at 09:16

## 2021-04-06 RX ADMIN — CHLORHEXIDINE GLUCONATE 15 MILLILITER(S): 213 SOLUTION TOPICAL at 17:40

## 2021-04-06 RX ADMIN — Medication 40 MILLIGRAM(S): at 05:07

## 2021-04-06 RX ADMIN — POTASSIUM PHOSPHATE, MONOBASIC POTASSIUM PHOSPHATE, DIBASIC 83.33 MILLIMOLE(S): 236; 224 INJECTION, SOLUTION INTRAVENOUS at 22:28

## 2021-04-06 RX ADMIN — Medication 1000 MILLIGRAM(S): at 11:46

## 2021-04-06 RX ADMIN — MIDAZOLAM HYDROCHLORIDE 1.68 MG/KG/HR: 1 INJECTION, SOLUTION INTRAMUSCULAR; INTRAVENOUS at 23:15

## 2021-04-06 RX ADMIN — Medication 5 MILLIGRAM(S): at 19:26

## 2021-04-06 RX ADMIN — Medication 25 MILLIGRAM(S): at 13:31

## 2021-04-06 RX ADMIN — Medication 2 MILLIGRAM(S): at 17:41

## 2021-04-06 RX ADMIN — Medication 20 MILLIGRAM(S): at 08:37

## 2021-04-06 RX ADMIN — ALBUTEROL 2 PUFF(S): 90 AEROSOL, METERED ORAL at 05:00

## 2021-04-06 RX ADMIN — CHLORHEXIDINE GLUCONATE 15 MILLILITER(S): 213 SOLUTION TOPICAL at 05:07

## 2021-04-06 RX ADMIN — MIDAZOLAM HYDROCHLORIDE 4 MILLIGRAM(S): 1 INJECTION, SOLUTION INTRAMUSCULAR; INTRAVENOUS at 05:26

## 2021-04-06 NOTE — CHART NOTE - NSCHARTNOTEFT_GEN_A_CORE
Spoke with patient's brother via [employer] [phone] and provided with the daily progress information

## 2021-04-06 NOTE — PROGRESS NOTE ADULT - ATTENDING COMMENTS
55 yr old  man , non smoker with  moody 1990s presented 3/14 with x9 days worsening cough, subjective fevers, and SOB, with x2-3 days dysuria and central, non-radiating, constant CP. Admitted to medicine unit  for acute hypoxic respiratory failure secondary to pna from covid-19 infection .     Assessment:  1. Acute hypoxic respiratory failure  2 Covid-19 infection   3. Transaminitis  4. Prediabetes  5. Bilateral pneumothorax      Plan   -pat intubated AM 3/29   -small apical left PTX with subcutaneous emphysema   -lower PEEP   -PTX  improved   -diuresis   -change iv versed to po/ngt  -adjust vent as per ABG  -Repeated CXR  is worst and monitor respiratory status  -isolation : contact and air borne   -monitor biomarkers daily, trending down   -trial of semi- pulse steroid .. solumedrol 250 bid x 3/3 days   -dvt/gi prophy  -not a candidate for remdesivir due to covid-19 antibodies and elevated LFT  -not a candidate for Tociluzimab due to elevated LFT  -hemodynamic monitoring   -NGT .

## 2021-04-06 NOTE — PROGRESS NOTE ADULT - SUBJECTIVE AND OBJECTIVE BOX
INTERVAL HPI/OVERNIGHT EVENTS: ***    PRESSORS: [ ] YES [ ] NO  WHICH:    ANTIBIOTICS:                  DATE STARTED:  ANTIBIOTICS:                  DATE STARTED:  ANTIBIOTICS:                  DATE STARTED:    Antimicrobial:    Cardiovascular:    Pulmonary:  ALBUTerol    90 MICROgram(s) HFA Inhaler 2 Puff(s) Inhalation every 6 hours    Hematalogic:  aspirin  chewable 81 milliGRAM(s) Oral daily  enoxaparin Injectable 40 milliGRAM(s) SubCutaneous two times a day    Other:  acetaminophen    Suspension .. 650 milliGRAM(s) Oral every 6 hours PRN  ascorbic acid 1000 milliGRAM(s) Oral daily  bisacodyl Suppository 10 milliGRAM(s) Rectal daily  chlorhexidine 0.12% Liquid 15 milliLiter(s) Oral Mucosa two times a day  chlorhexidine 2% Cloths 1 Application(s) Topical <User Schedule>  fentaNYL   Infusion. 4 MICROgram(s)/kG/Hr IV Continuous <Continuous>  insulin lispro (ADMELOG) corrective regimen sliding scale   SubCutaneous every 6 hours  methylPREDNISolone sodium succinate Injectable 40 milliGRAM(s) IV Push daily  pantoprazole   Suspension 40 milliGRAM(s) Oral daily  polyethylene glycol 3350 17 Gram(s) Oral daily  propofol Infusion 30.007 MICROgram(s)/kG/Min IV Continuous <Continuous>  senna 2 Tablet(s) Oral at bedtime  sodium chloride 0.9% lock flush 10 milliLiter(s) IV Push every 1 hour PRN    acetaminophen    Suspension .. 650 milliGRAM(s) Oral every 6 hours PRN  ALBUTerol    90 MICROgram(s) HFA Inhaler 2 Puff(s) Inhalation every 6 hours  ascorbic acid 1000 milliGRAM(s) Oral daily  aspirin  chewable 81 milliGRAM(s) Oral daily  bisacodyl Suppository 10 milliGRAM(s) Rectal daily  chlorhexidine 0.12% Liquid 15 milliLiter(s) Oral Mucosa two times a day  chlorhexidine 2% Cloths 1 Application(s) Topical <User Schedule>  enoxaparin Injectable 40 milliGRAM(s) SubCutaneous two times a day  fentaNYL   Infusion. 4 MICROgram(s)/kG/Hr IV Continuous <Continuous>  insulin lispro (ADMELOG) corrective regimen sliding scale   SubCutaneous every 6 hours  methylPREDNISolone sodium succinate Injectable 40 milliGRAM(s) IV Push daily  pantoprazole   Suspension 40 milliGRAM(s) Oral daily  polyethylene glycol 3350 17 Gram(s) Oral daily  propofol Infusion 30.007 MICROgram(s)/kG/Min IV Continuous <Continuous>  senna 2 Tablet(s) Oral at bedtime  sodium chloride 0.9% lock flush 10 milliLiter(s) IV Push every 1 hour PRN    Drug Dosing Weight  Height (cm): 167.6 (14 Mar 2021 12:03)  Weight (kg): 83.869 (14 Mar 2021 12:03)  BMI (kg/m2): 29.9 (14 Mar 2021 12:03)  BSA (m2): 1.93 (14 Mar 2021 12:03)    CENTRAL LINE: [ ] YES [ ] NO  LOCATION:   DATE INSERTED:  REMOVE: [ ] YES [ ] NO  EXPLAIN:    RIVERA: [ ] YES [ ] NO    DATE INSERTED:  REMOVE:  [ ] YES [ ] NO  EXPLAIN:    A-LINE:  [ ] YES [ ] NO  LOCATION:   DATE INSERTED:  REMOVE:  [ ] YES [ ] NO  EXPLAIN:    PMH -reviewed admission note, no change since admission  PAST MEDICAL & SURGICAL HISTORY:  No pertinent past medical history    S/P moody  1990s    S/P appendectomy  1990s        ICU Vital Signs Last 24 Hrs  T(C): 37.6 (06 Apr 2021 04:00), Max: 37.6 (06 Apr 2021 04:00)  T(F): 99.6 (06 Apr 2021 04:00), Max: 99.6 (06 Apr 2021 04:00)  HR: 83 (06 Apr 2021 06:00) (72 - 91)  BP: --  BP(mean): --  ABP: 130/60 (06 Apr 2021 06:00) (121/58 - 168/80)  ABP(mean): 82 (06 Apr 2021 06:00) (75 - 109)  RR: 31 (06 Apr 2021 06:00) (19 - 38)  SpO2: 92% (06 Apr 2021 06:00) (90% - 96%)      ABG - ( 06 Apr 2021 04:26 )  pH, Arterial: 7.42  pH, Blood: x     /  pCO2: 56    /  pO2: 57    / HCO3: 35    / Base Excess: 9.0   /  SaO2: 89                    04-05 @ 07:01  -  04-06 @ 07:00  --------------------------------------------------------  IN: 1864 mL / OUT: 1130 mL / NET: 734 mL        Mode: AC/ CMV (Assist Control/ Continuous Mandatory Ventilation)  RR (machine): 24  TV (machine): 450  FiO2: 60  PEEP: 8  ITime: 0.8  MAP: 12  PIP: 25      PHYSICAL EXAM:    GENERAL: [ ]NAD, [ ]well-groomed, [ ]well-developed  HEAD:  [ ]Atraumatic, [ ]Normocephalic  EYES: [ ]EOMI, [ ]PERRLA, [ ]conjunctiva and sclera clear  ENMT: [ ]No tonsillar erythema, exudates, or enlargement; [ ]Moist mucous membranes, [ ]Good dentition, [ ]No lesions  NECK: [ ]Supple, normal appearance, [ ]No JVD; [ ]Normal thyroid; [ ]Trachea midline  NERVOUS SYSTEM:  [ ]Alert & Oriented X3, [ ]Good concentration; [ ]Motor Strength 5/5 B/L upper and lower extremities; [ ]DTRs 2+ intact and symmetric  CHEST/LUNG: [ ]No chest deformity; [ ]Normal percussion bilaterally; [ ]No rales, rhonchi, wheezing   HEART: [ ]Regular rate and rhythm; [ ]No murmurs, rubs, or gallops  ABDOMEN: [ ]Soft, Nontender, Nondistended; [ ]Bowel sounds present  EXTREMITIES:  [ ]2+ Peripheral Pulses, [ ]No clubbing, cyanosis, or edema  LYMPH: [ ]No lymphadenopathy noted  SKIN: [ ]No rashes or lesions; [ ]Good capillary refill      LABS:  CBC Full  -  ( 06 Apr 2021 05:03 )  WBC Count : 11.53 K/uL  RBC Count : 3.42 M/uL  Hemoglobin : 10.9 g/dL  Hematocrit : 33.9 %  Platelet Count - Automated : 145 K/uL  Mean Cell Volume : 99.1 fl  Mean Cell Hemoglobin : 31.9 pg  Mean Cell Hemoglobin Concentration : 32.2 gm/dL  Auto Neutrophil # : 9.57 K/uL  Auto Lymphocyte # : 1.04 K/uL  Auto Monocyte # : 0.12 K/uL  Auto Eosinophil # : 0.23 K/uL  Auto Basophil # : 0.00 K/uL  Auto Neutrophil % : 79.0 %  Auto Lymphocyte % : 9.0 %  Auto Monocyte % : 1.0 %  Auto Eosinophil % : 2.0 %  Auto Basophil % : 0.0 %    04-06    145  |  106  |  31<H>  ----------------------------<  100<H>  4.1   |  35<H>  |  0.37<L>    Ca    8.0<L>      06 Apr 2021 05:03  Phos  1.8     04-06  Mg     2.1     04-06    TPro  5.4<L>  /  Alb  1.5<L>  /  TBili  0.4  /  DBili  x   /  AST  39  /  ALT  62<H>  /  AlkPhos  97  04-06    PT/INR - ( 06 Apr 2021 05:03 )   PT: 13.2 sec;   INR: 1.12 ratio         PTT - ( 06 Apr 2021 05:03 )  PTT:29.9 sec        RADIOLOGY & ADDITIONAL STUDIES REVIEWED:  ***    [ ]GOALS OF CARE DISCUSSION WITH PATIENT/FAMILY/PROXY:    CRITICAL CARE TIME SPENT: 35 minutes INTERVAL HPI/OVERNIGHT EVENTS: Patient was having abdominal breathing overnight, received Versed push around 5Am in Morning,   Later found to be desaturating to high 80's with BP going up,       PRESSORS: [ ] YES [ x] NO  WHICH:    ANTIBIOTICS:                  DATE STARTED:  ANTIBIOTICS:                  DATE STARTED:  ANTIBIOTICS:                  DATE STARTED:    Antimicrobial:    Cardiovascular:    Pulmonary:  ALBUTerol    90 MICROgram(s) HFA Inhaler 2 Puff(s) Inhalation every 6 hours    Hematalogic:  aspirin  chewable 81 milliGRAM(s) Oral daily  enoxaparin Injectable 40 milliGRAM(s) SubCutaneous two times a day    Other:  acetaminophen    Suspension .. 650 milliGRAM(s) Oral every 6 hours PRN  ascorbic acid 1000 milliGRAM(s) Oral daily  bisacodyl Suppository 10 milliGRAM(s) Rectal daily  chlorhexidine 0.12% Liquid 15 milliLiter(s) Oral Mucosa two times a day  chlorhexidine 2% Cloths 1 Application(s) Topical <User Schedule>  fentaNYL   Infusion. 4 MICROgram(s)/kG/Hr IV Continuous <Continuous>  insulin lispro (ADMELOG) corrective regimen sliding scale   SubCutaneous every 6 hours  methylPREDNISolone sodium succinate Injectable 40 milliGRAM(s) IV Push daily  pantoprazole   Suspension 40 milliGRAM(s) Oral daily  polyethylene glycol 3350 17 Gram(s) Oral daily  propofol Infusion 30.007 MICROgram(s)/kG/Min IV Continuous <Continuous>  senna 2 Tablet(s) Oral at bedtime  sodium chloride 0.9% lock flush 10 milliLiter(s) IV Push every 1 hour PRN    acetaminophen    Suspension .. 650 milliGRAM(s) Oral every 6 hours PRN  ALBUTerol    90 MICROgram(s) HFA Inhaler 2 Puff(s) Inhalation every 6 hours  ascorbic acid 1000 milliGRAM(s) Oral daily  aspirin  chewable 81 milliGRAM(s) Oral daily  bisacodyl Suppository 10 milliGRAM(s) Rectal daily  chlorhexidine 0.12% Liquid 15 milliLiter(s) Oral Mucosa two times a day  chlorhexidine 2% Cloths 1 Application(s) Topical <User Schedule>  enoxaparin Injectable 40 milliGRAM(s) SubCutaneous two times a day  fentaNYL   Infusion. 4 MICROgram(s)/kG/Hr IV Continuous <Continuous>  insulin lispro (ADMELOG) corrective regimen sliding scale   SubCutaneous every 6 hours  methylPREDNISolone sodium succinate Injectable 40 milliGRAM(s) IV Push daily  pantoprazole   Suspension 40 milliGRAM(s) Oral daily  polyethylene glycol 3350 17 Gram(s) Oral daily  propofol Infusion 30.007 MICROgram(s)/kG/Min IV Continuous <Continuous>  senna 2 Tablet(s) Oral at bedtime  sodium chloride 0.9% lock flush 10 milliLiter(s) IV Push every 1 hour PRN    Drug Dosing Weight  Height (cm): 167.6 (14 Mar 2021 12:03)  Weight (kg): 83.869 (14 Mar 2021 12:03)  BMI (kg/m2): 29.9 (14 Mar 2021 12:03)  BSA (m2): 1.93 (14 Mar 2021 12:03)    CENTRAL LINE: [x ] YES [ ] NO  LOCATION: [facility]  DATE INSERTED: 3/29  REMOVE: [ ] YES [ ] NO  EXPLAIN:    RIVERA: x[ ] YES [ ] NO    DATE INSERTED: 3  REMOVE:  [ ] YES [ ] NO  EXPLAIN:    A-LINE:  [x ] YES [ ] NO  LOCATION: [facility]   DATE INSERTED: 3/29  REMOVE:  [ ] YES [ ] NO  EXPLAIN:    PMH -reviewed admission note, no change since admission  PAST MEDICAL & SURGICAL HISTORY:  No pertinent past medical history    S/P moody  1990s    S/P appendectomy  1990s        ICU Vital Signs Last 24 Hrs  T(C): 37.6 (06 Apr 2021 04:00), Max: 37.6 (06 Apr 2021 04:00)  T(F): 99.6 (06 Apr 2021 04:00), Max: 99.6 (06 Apr 2021 04:00)  HR: 83 (06 Apr 2021 06:00) (72 - 91)    ABP: 130/60 (06 Apr 2021 06:00) (121/58 - 168/80)  ABP(mean): 82 (06 Apr 2021 06:00) (75 - 109)  RR: 31 (06 Apr 2021 06:00) (19 - 38)  SpO2: 92% (06 Apr 2021 06:00) (90% - 96%)      ABG - ( 06 Apr 2021 04:26 )  pH, Arterial: 7.42  pH, Blood: x     /  pCO2: 56    /  pO2: 57    / HCO3: 35    / Base Excess: 9.0   /  SaO2: 89                    04-05 @ 07:01  -  04-06 @ 07:00  --------------------------------------------------------  IN: 1864 mL / OUT: 1130 mL / NET: 734 mL        Mode: AC/ CMV (Assist Control/ Continuous Mandatory Ventilation)  RR (machine): 24  TV (machine): 450  FiO2: 60  PEEP: 8  ITime: 0.8  MAP: 12  PIP: 25      PHYSICAL EXAM:    GENERAL: sedated and intubated  HEAD:  Atraumatic, Normocephalic  EYES: EOMI, PERRLA, conjunctiva and sclera clear  ENMT: No tonsillar erythema, exudates, or enlargement; blood at nostrils  NECK: Supple, normal appearance,   NERVOUS SYSTEM: sedated and intubated  CHEST/LUNG: Pt has breath sounds on both sides , Abdominal breath sounds   HEART: s1,s2  No murmurs, rubs, or gallops  ABDOMEN: Soft, Nontender, Nondistended;  EXTREMITIES:  2+ Peripheral Pulses, No clubbing, cyanosis, or edema  LYMPH: No lymphadenopathy noted  SKIN: No rashes or lesions; Good capillary       LABS:  CBC Full  -  ( 06 Apr 2021 05:03 )  WBC Count : 11.53 K/uL  RBC Count : 3.42 M/uL  Hemoglobin : 10.9 g/dL  Hematocrit : 33.9 %  Platelet Count - Automated : 145 K/uL  Mean Cell Volume : 99.1 fl  Mean Cell Hemoglobin : 31.9 pg  Mean Cell Hemoglobin Concentration : 32.2 gm/dL  Auto Neutrophil # : 9.57 K/uL  Auto Lymphocyte # : 1.04 K/uL  Auto Monocyte # : 0.12 K/uL  Auto Eosinophil # : 0.23 K/uL  Auto Basophil # : 0.00 K/uL  Auto Neutrophil % : 79.0 %  Auto Lymphocyte % : 9.0 %  Auto Monocyte % : 1.0 %  Auto Eosinophil % : 2.0 %  Auto Basophil % : 0.0 %    04-06    145  |  106  |  31<H>  ----------------------------<  100<H>  4.1   |  35<H>  |  0.37<L>    Ca    8.0<L>      06 Apr 2021 05:03  Phos  1.8     04-06  Mg     2.1     04-06    TPro  5.4<L>  /  Alb  1.5<L>  /  TBili  0.4  /  DBili  x   /  AST  39  /  ALT  62<H>  /  AlkPhos  97  04-06    PT/INR - ( 06 Apr 2021 05:03 )   PT: 13.2 sec;   INR: 1.12 ratio         PTT - ( 06 Apr 2021 05:03 )  PTT:29.9 sec        RADIOLOGY & ADDITIONAL STUDIES REVIEWED:  Yes    [ ]GOALS OF CARE DISCUSSION WITH PATIENT/FAMILY/PROXY:    CRITICAL CARE TIME SPENT: 35 minutes INTERVAL HPI/OVERNIGHT EVENTS: Patient was having abdominal breathing overnight, received Versed push around 5Am in Morning,   Later found to be desaturating to high 80's with BP going up,   S/P labetalol IVP and Dilaudid     PRESSORS: [ ] YES [ x] NO  WHICH:    ANTIBIOTICS:                  DATE STARTED:  ANTIBIOTICS:                  DATE STARTED:  ANTIBIOTICS:                  DATE STARTED:    Antimicrobial:    Cardiovascular:    Pulmonary:  ALBUTerol    90 MICROgram(s) HFA Inhaler 2 Puff(s) Inhalation every 6 hours    Hematalogic:  aspirin  chewable 81 milliGRAM(s) Oral daily  enoxaparin Injectable 40 milliGRAM(s) SubCutaneous two times a day    Other:  acetaminophen    Suspension .. 650 milliGRAM(s) Oral every 6 hours PRN  ascorbic acid 1000 milliGRAM(s) Oral daily  bisacodyl Suppository 10 milliGRAM(s) Rectal daily  chlorhexidine 0.12% Liquid 15 milliLiter(s) Oral Mucosa two times a day  chlorhexidine 2% Cloths 1 Application(s) Topical <User Schedule>  fentaNYL   Infusion. 4 MICROgram(s)/kG/Hr IV Continuous <Continuous>  insulin lispro (ADMELOG) corrective regimen sliding scale   SubCutaneous every 6 hours  methylPREDNISolone sodium succinate Injectable 40 milliGRAM(s) IV Push daily  pantoprazole   Suspension 40 milliGRAM(s) Oral daily  polyethylene glycol 3350 17 Gram(s) Oral daily  propofol Infusion 30.007 MICROgram(s)/kG/Min IV Continuous <Continuous>  senna 2 Tablet(s) Oral at bedtime  sodium chloride 0.9% lock flush 10 milliLiter(s) IV Push every 1 hour PRN    acetaminophen    Suspension .. 650 milliGRAM(s) Oral every 6 hours PRN  ALBUTerol    90 MICROgram(s) HFA Inhaler 2 Puff(s) Inhalation every 6 hours  ascorbic acid 1000 milliGRAM(s) Oral daily  aspirin  chewable 81 milliGRAM(s) Oral daily  bisacodyl Suppository 10 milliGRAM(s) Rectal daily  chlorhexidine 0.12% Liquid 15 milliLiter(s) Oral Mucosa two times a day  chlorhexidine 2% Cloths 1 Application(s) Topical <User Schedule>  enoxaparin Injectable 40 milliGRAM(s) SubCutaneous two times a day  fentaNYL   Infusion. 4 MICROgram(s)/kG/Hr IV Continuous <Continuous>  insulin lispro (ADMELOG) corrective regimen sliding scale   SubCutaneous every 6 hours  methylPREDNISolone sodium succinate Injectable 40 milliGRAM(s) IV Push daily  pantoprazole   Suspension 40 milliGRAM(s) Oral daily  polyethylene glycol 3350 17 Gram(s) Oral daily  propofol Infusion 30.007 MICROgram(s)/kG/Min IV Continuous <Continuous>  senna 2 Tablet(s) Oral at bedtime  sodium chloride 0.9% lock flush 10 milliLiter(s) IV Push every 1 hour PRN    Drug Dosing Weight  Height (cm): 167.6 (14 Mar 2021 12:03)  Weight (kg): 83.869 (14 Mar 2021 12:03)  BMI (kg/m2): 29.9 (14 Mar 2021 12:03)  BSA (m2): 1.93 (14 Mar 2021 12:03)    CENTRAL LINE: [x ] YES [ ] NO  LOCATION: [facility]  DATE INSERTED: 3/29  REMOVE: [ ] YES [ ] NO  EXPLAIN:    RIVERA: x[ ] YES [ ] NO    DATE INSERTED: 3  REMOVE:  [ ] YES [ ] NO  EXPLAIN:    A-LINE:  [x ] YES [ ] NO  LOCATION: [facility]   DATE INSERTED: 3/29  REMOVE:  [ ] YES [ ] NO  EXPLAIN:    PMH -reviewed admission note, no change since admission  PAST MEDICAL & SURGICAL HISTORY:  No pertinent past medical history    S/P moody  1990s    S/P appendectomy  1990s        ICU Vital Signs Last 24 Hrs  T(C): 37.6 (06 Apr 2021 04:00), Max: 37.6 (06 Apr 2021 04:00)  T(F): 99.6 (06 Apr 2021 04:00), Max: 99.6 (06 Apr 2021 04:00)  HR: 83 (06 Apr 2021 06:00) (72 - 91)    ABP: 130/60 (06 Apr 2021 06:00) (121/58 - 168/80)  ABP(mean): 82 (06 Apr 2021 06:00) (75 - 109)  RR: 31 (06 Apr 2021 06:00) (19 - 38)  SpO2: 92% (06 Apr 2021 06:00) (90% - 96%)      ABG - ( 06 Apr 2021 04:26 )  pH, Arterial: 7.42  pH, Blood: x     /  pCO2: 56    /  pO2: 57    / HCO3: 35    / Base Excess: 9.0   /  SaO2: 89                    04-05 @ 07:01  -  04-06 @ 07:00  --------------------------------------------------------  IN: 1864 mL / OUT: 1130 mL / NET: 734 mL        Mode: AC/ CMV (Assist Control/ Continuous Mandatory Ventilation)  RR (machine): 24  TV (machine): 450  FiO2: 60  PEEP: 8  ITime: 0.8  MAP: 12  PIP: 25      PHYSICAL EXAM:    GENERAL: sedated and intubated  HEAD:  Atraumatic, Normocephalic  EYES: EOMI, PERRLA, conjunctiva and sclera clear  ENMT: No tonsillar erythema, exudates, or enlargement; blood at nostrils  NECK: Supple, normal appearance,   NERVOUS SYSTEM: sedated and intubated  CHEST/LUNG: Pt has breath sounds on both sides , Abdominal breath sounds   HEART: s1,s2  No murmurs, rubs, or gallops  ABDOMEN: Soft, Nontender, distended;  EXTREMITIES:  2+ Peripheral Pulses, No clubbing, getting more  edematous  LYMPH: No lymphadenopathy noted  SKIN: No rashes or lesions; Good capillary       LABS:  CBC Full  -  ( 06 Apr 2021 05:03 )  WBC Count : 11.53 K/uL  RBC Count : 3.42 M/uL  Hemoglobin : 10.9 g/dL  Hematocrit : 33.9 %  Platelet Count - Automated : 145 K/uL  Mean Cell Volume : 99.1 fl  Mean Cell Hemoglobin : 31.9 pg  Mean Cell Hemoglobin Concentration : 32.2 gm/dL  Auto Neutrophil # : 9.57 K/uL  Auto Lymphocyte # : 1.04 K/uL  Auto Monocyte # : 0.12 K/uL  Auto Eosinophil # : 0.23 K/uL  Auto Basophil # : 0.00 K/uL  Auto Neutrophil % : 79.0 %  Auto Lymphocyte % : 9.0 %  Auto Monocyte % : 1.0 %  Auto Eosinophil % : 2.0 %  Auto Basophil % : 0.0 %    04-06    145  |  106  |  31<H>  ----------------------------<  100<H>  4.1   |  35<H>  |  0.37<L>    Ca    8.0<L>      06 Apr 2021 05:03  Phos  1.8     04-06  Mg     2.1     04-06    TPro  5.4<L>  /  Alb  1.5<L>  /  TBili  0.4  /  DBili  x   /  AST  39  /  ALT  62<H>  /  AlkPhos  97  04-06    PT/INR - ( 06 Apr 2021 05:03 )   PT: 13.2 sec;   INR: 1.12 ratio         PTT - ( 06 Apr 2021 05:03 )  PTT:29.9 sec        RADIOLOGY & ADDITIONAL STUDIES REVIEWED:  Yes    [ ]GOALS OF CARE DISCUSSION WITH PATIENT/FAMILY/PROXY:    CRITICAL CARE TIME SPENT: 35 minutes INTERVAL HPI/OVERNIGHT EVENTS: Patient was having abdominal breathing overnight, received Versed push around 5Am in Morning,   Later found to be desaturating to high 80's with BP going up,   S/P labetalol IVP and Dilaudid     PRESSORS: [ ] YES [ x] NO  WHICH:    ANTIBIOTICS:                  DATE STARTED:  ANTIBIOTICS:                  DATE STARTED:  ANTIBIOTICS:                  DATE STARTED:    Antimicrobial:    Cardiovascular:    Pulmonary:  ALBUTerol    90 MICROgram(s) HFA Inhaler 2 Puff(s) Inhalation every 6 hours    Hematalogic:  aspirin  chewable 81 milliGRAM(s) Oral daily  enoxaparin Injectable 40 milliGRAM(s) SubCutaneous two times a day    Other:  acetaminophen    Suspension .. 650 milliGRAM(s) Oral every 6 hours PRN  ascorbic acid 1000 milliGRAM(s) Oral daily  bisacodyl Suppository 10 milliGRAM(s) Rectal daily  chlorhexidine 0.12% Liquid 15 milliLiter(s) Oral Mucosa two times a day  chlorhexidine 2% Cloths 1 Application(s) Topical <User Schedule>  fentaNYL   Infusion. 4 MICROgram(s)/kG/Hr IV Continuous <Continuous>  insulin lispro (ADMELOG) corrective regimen sliding scale   SubCutaneous every 6 hours  methylPREDNISolone sodium succinate Injectable 40 milliGRAM(s) IV Push daily  pantoprazole   Suspension 40 milliGRAM(s) Oral daily  polyethylene glycol 3350 17 Gram(s) Oral daily  propofol Infusion 30.007 MICROgram(s)/kG/Min IV Continuous <Continuous>  senna 2 Tablet(s) Oral at bedtime  sodium chloride 0.9% lock flush 10 milliLiter(s) IV Push every 1 hour PRN    acetaminophen    Suspension .. 650 milliGRAM(s) Oral every 6 hours PRN  ALBUTerol    90 MICROgram(s) HFA Inhaler 2 Puff(s) Inhalation every 6 hours  ascorbic acid 1000 milliGRAM(s) Oral daily  aspirin  chewable 81 milliGRAM(s) Oral daily  bisacodyl Suppository 10 milliGRAM(s) Rectal daily  chlorhexidine 0.12% Liquid 15 milliLiter(s) Oral Mucosa two times a day  chlorhexidine 2% Cloths 1 Application(s) Topical <User Schedule>  enoxaparin Injectable 40 milliGRAM(s) SubCutaneous two times a day  fentaNYL   Infusion. 4 MICROgram(s)/kG/Hr IV Continuous <Continuous>  insulin lispro (ADMELOG) corrective regimen sliding scale   SubCutaneous every 6 hours  methylPREDNISolone sodium succinate Injectable 40 milliGRAM(s) IV Push daily  pantoprazole   Suspension 40 milliGRAM(s) Oral daily  polyethylene glycol 3350 17 Gram(s) Oral daily  propofol Infusion 30.007 MICROgram(s)/kG/Min IV Continuous <Continuous>  senna 2 Tablet(s) Oral at bedtime  sodium chloride 0.9% lock flush 10 milliLiter(s) IV Push every 1 hour PRN    Drug Dosing Weight  Height (cm): 167.6 (14 Mar 2021 12:03)  Weight (kg): 83.869 (14 Mar 2021 12:03)  BMI (kg/m2): 29.9 (14 Mar 2021 12:03)  BSA (m2): 1.93 (14 Mar 2021 12:03)    CENTRAL LINE: [x ] YES [ ] NO  LOCATION: [facility]  DATE INSERTED: 3/29  REMOVE: [ ] YES [ ] NO  EXPLAIN:    RIVERA: x[ ] YES [ ] NO    DATE INSERTED: 3  REMOVE:  [ ] YES [ ] NO  EXPLAIN:    A-LINE:  [x ] YES [ ] NO  LOCATION: [facility]   DATE INSERTED: 3/29  REMOVE:  [ ] YES [ ] NO  EXPLAIN:    PMH -reviewed admission note, no change since admission  PAST MEDICAL & SURGICAL HISTORY:  No pertinent past medical history    S/P moody  1990s    S/P appendectomy  1990s        ICU Vital Signs Last 24 Hrs  T(C): 37.6 (06 Apr 2021 04:00), Max: 37.6 (06 Apr 2021 04:00)  T(F): 99.6 (06 Apr 2021 04:00), Max: 99.6 (06 Apr 2021 04:00)  HR: 83 (06 Apr 2021 06:00) (72 - 91)    ABP: 130/60 (06 Apr 2021 06:00) (121/58 - 168/80)  ABP(mean): 82 (06 Apr 2021 06:00) (75 - 109)  RR: 31 (06 Apr 2021 06:00) (19 - 38)  SpO2: 92% (06 Apr 2021 06:00) (90% - 96%)      ABG - ( 06 Apr 2021 04:26 )  pH, Arterial: 7.42  pH, Blood: x     /  pCO2: 56    /  pO2: 57    / HCO3: 35    / Base Excess: 9.0   /  SaO2: 89                    04-05 @ 07:01  -  04-06 @ 07:00  --------------------------------------------------------  IN: 1864 mL / OUT: 1130 mL / NET: 734 mL        Mode: AC/ CMV (Assist Control/ Continuous Mandatory Ventilation)  RR (machine): 24  TV (machine): 450  FiO2: 60  PEEP: 8  ITime: 0.8  MAP: 12  PIP: 25      PHYSICAL EXAM:    GENERAL: sedated and intubated  HEAD:  Atraumatic, Normocephalic  EYES: EOMI, PERRLA, conjunctiva and sclera clear  ENMT: No tonsillar erythema, exudates, or enlargement; blood at nostrils  NECK: Supple, normal appearance,   NERVOUS SYSTEM: sedated and intubated  CHEST/LUNG: Pt has breath sounds on both sides , Abdominal breath sounds   HEART: s1,s2  No murmurs, rubs, or gallops  ABDOMEN: Soft, Nontender, distended;  EXTREMITIES:  2+ Peripheral Pulses, No clubbing, getting more  edematous  LYMPH: No lymphadenopathy noted  SKIN: No rashes or lesions; Good capillary       LABS:  CBC Full  -  ( 06 Apr 2021 05:03 )  WBC Count : 11.53 K/uL  RBC Count : 3.42 M/uL  Hemoglobin : 10.9 g/dL  Hematocrit : 33.9 %  Platelet Count - Automated : 145 K/uL  Mean Cell Volume : 99.1 fl  Mean Cell Hemoglobin : 31.9 pg  Mean Cell Hemoglobin Concentration : 32.2 gm/dL  Auto Neutrophil # : 9.57 K/uL  Auto Lymphocyte # : 1.04 K/uL  Auto Monocyte # : 0.12 K/uL  Auto Eosinophil # : 0.23 K/uL  Auto Basophil # : 0.00 K/uL  Auto Neutrophil % : 79.0 %  Auto Lymphocyte % : 9.0 %  Auto Monocyte % : 1.0 %  Auto Eosinophil % : 2.0 %  Auto Basophil % : 0.0 %    04-06    145  |  106  |  31<H>  ----------------------------<  100<H>  4.1   |  35<H>  |  0.37<L>    Ca    8.0<L>      06 Apr 2021 05:03  Phos  1.8     04-06  Mg     2.1     04-06    TPro  5.4<L>  /  Alb  1.5<L>  /  TBili  0.4  /  DBili  x   /  AST  39  /  ALT  62<H>  /  AlkPhos  97  04-06    PT/INR - ( 06 Apr 2021 05:03 )   PT: 13.2 sec;   INR: 1.12 ratio         PTT - ( 06 Apr 2021 05:03 )  PTT:29.9 sec        RADIOLOGY & ADDITIONAL STUDIES REVIEWED:  Yes    CXR:< from: Xray Chest 1 View- PORTABLE-Routine (Xray Chest 1 View- PORTABLE-Routine in AM.) (04.06.21 @ 07:33) >    EXAM:  XR CHEST PORTABLE ROUTINE 1V                            PROCEDURE DATE:  04/06/2021          INTERPRETATION:  CLINICAL STATEMENT: Follow-up chest pain.    TECHNIQUE: AP view of the chest.    COMPARISON: 4/5/2021    FINDINGS/  IMPRESSION:  Study limited due to rotation.    Right central line, ET tube and feeding tube again noted. No pneumothorax.    Opacities lung bases without significant change    Heart size cannot be accurately assessed in this projection.              TYE GALAN MD; Attending Radiologist  This document has been electronically signed. Apr 6 2021  9:27AM    < end of copied text >    [ ]GOALS OF CARE DISCUSSION WITH PATIENT/FAMILY/PROXY:    CRITICAL CARE TIME SPENT: 35 minutes

## 2021-04-06 NOTE — PROGRESS NOTE ADULT - ASSESSMENT
ASSESSMENT AND PLAN:  55M, no PMH, PSH appy and moody 1990s presented 3/14 with x9 days worsening cough, subjective fevers, and SOB, with x2-3 days dysuria and central, non-radiating, constant CP. Admitted to [facility] for acute hypoxic respiratory failure s/t covid pneumonia, ICU consulted for increasing work of breathing on 15 lpm NRM.     1. Acute hypoxic respiratory failure  2. Covid pneumonia  3. Transaminitis  4. Prediabetes  5. Abnormal TSH    =================== Neuro============================  Alert and oriented x 3 at baseline     intubated and sedated 3/29   Propofol and fentanyl, Off Versed since 11 Am       ================= Cardiovascular==========================  Hypertension  not on any pressor support, maintaining map above 60    TACHYCARDIA:  pT HR in 80s   will keep monitoring         ================- Pulm=================================  Acute hypoxic respiratory failure: secondary to covid pneumonia  -was on  HFNr then got intubated 3/29  -D-dimer initially elevated on presentation, now wnl  -Remdesivir was discontinued due to positive antibodies   - add albuterol prn   -procalcitonin wnl, d-dimer in 200s  -started pulse steroids for 3 days - 250mg solumedrol bid- completed3/27  - solumedrol 40 once daily    #Pneumothorax and pneumomediastinum:  X-ray chest: tiny left apical pneumothorax  Thoracic surgery consulted recommended no intervention at this time, just observation for now  X-ray monitoring daily    ==================ID===================================  Covid pneumonia  - leukocytosis resolved  -plan as above     ================= Nephro================================  -No issues   -monitor lytes, SCr   -monitor I/Os    =================GI====================================  Transaminitis:   likely secondary to covid   - and  on presentation  -Improved  -continue to monitor,  -  hepatitis panel -ve    diet:   ng tube and tube feed  bowel regimen,   Was having loose stools,     ================ Heme==================================  Elevated d-dimer: likely secondary to Covid  -d-dimer 423 on presentation, now wnl  -continue prophylactic Lovenox 40 mg bid    =================Endocrine===============================  Prediabetes:    -A1c  5.8  -BS controlled  -continue HSS  -monitor FS while on steroids    Abnormal TSH:  -TSH level noted 0.26,   -TSH 0.37 and   - Free T4 1.82      ================= Skin/Catheters============================  No rashes. Peripheral IV lines.   central line, a-line, ng tube present 3/29, Will change today or tomorrow     - =================Prophylaxis =============================  Lovenox for DVT proph  Protonix for  GI proph    ==================GOC==================================   FULL CODE    Spoke with patient/s wife Rosa and grand daughter, she wants her son to be the POC   ASSESSMENT AND PLAN:  55M, no PMH, PSH appy and moody 1990s presented 3/14 with x9 days worsening cough, subjective fevers, and SOB, with x2-3 days dysuria and central, non-radiating, constant CP. Admitted to [facility] for acute hypoxic respiratory failure s/t covid pneumonia, ICU consulted for increasing work of breathing on 15 lpm NRM.     1. Acute hypoxic respiratory failure  2. Covid pneumonia  3. Transaminitis  4. Prediabetes  5. Abnormal TSH    =================== Neuro============================  Alert and oriented x 3 at baseline     intubated and sedated 3/29   Propofol and fentanyl, Off Versed since 11 Am       ================= Cardiovascular==========================  Hypertension  not on any pressor support, maintaining map above 60    TACHYCARDIA:  pT HR in 80s   will keep monitoring         ================- Pulm=================================  Acute hypoxic respiratory failure: secondary to covid pneumonia  -was on  HFNr then got intubated 3/29  -D-dimer initially elevated on presentation, now wnl  -Remdesivir was discontinued due to positive antibodies   - add albuterol prn   -procalcitonin wnl, d-dimer in 200s  -started pulse steroids for 3 days - 250mg solumedrol bid- completed3/27  - solumedrol 40 once daily    #Pneumothorax and pneumomediastinum:  X-ray chest: tiny left apical pneumothorax  Thoracic surgery consulted recommended no intervention at this time, just observation for now  X-ray monitoring daily    ==================ID===================================  Covid pneumonia  - leukocytosis resolved  -plan as above     ================= Nephro================================  -No issues   -monitor lytes, SCr   -monitor I/Os,   -Net positive , Dark urine ,    =================GI====================================  Transaminitis:   likely secondary to covid   - and  on presentation  -Improved  -continue to monitor,  -  hepatitis panel -ve    diet:   ng tube and tube feed  bowel regimen,   Was having loose stools,     ================ Heme==================================  Elevated d-dimer: likely secondary to Covid  -d-dimer 423 on presentation, now wnl  -continue prophylactic Lovenox 40 mg bid    =================Endocrine===============================  Prediabetes:    -A1c  5.8  -BS controlled  -continue HSS  -monitor FS while on steroids    Abnormal TSH:  -TSH level noted 0.26,   -TSH 0.37 and   - Free T4 1.82      ================= Skin/Catheters============================  No rashes. Peripheral IV lines.   central line, a-line, ng tube present 3/29, Will change today 4/6    - =================Prophylaxis =============================  Lovenox for DVT proph  Protonix for  GI proph    ==================GOC==================================   FULL CODE    Spoke with patient/s wife Rosa and grand daughter, she wants her son to be the POC   ASSESSMENT AND PLAN:  55M, no PMH, PSH appy and moody 1990s presented 3/14 with x9 days worsening cough, subjective fevers, and SOB, with x2-3 days dysuria and central, non-radiating, constant CP. Admitted to [facility] for acute hypoxic respiratory failure s/t covid pneumonia, ICU consulted for increasing work of breathing on 15 lpm NRM.     1. Acute hypoxic respiratory failure  2. ARDS 2/2 Covid pneumonia  3. Transaminitis  4. Prediabetes  5. Abnormal TSH    =================== Neuro============================  Alert and oriented x 3 at baseline     intubated and sedated 3/29   Propofol and fentanyl, Restarted on Versed for vent synchrony         ================= Cardiovascular==========================  Hypertension  not on any pressor support, maintaining map above 60\  s/p Labetalol 20mg IVP for high BP      TACHYCARDIA:  pT HR in 80s   will keep monitoring         ================- Pulm=================================  Acute hypoxic respiratory failure: secondary to covid pneumonia  -was on  HFNr then got intubated 3/29  -D-dimer initially elevated on presentation, now wnl  -Remdesivir was discontinued due to positive antibodies   - add albuterol prn   -procalcitonin wnl, d-dimer in 200s  -started pulse steroids for 3 days - 250mg solumedrol bid- completed3/27  - solumedrol 40 once daily  - Finished Dexa     #Pneumothorax and pneumomediastinum:  X-ray chest: tiny left apical pneumothorax  Thoracic surgery consulted recommended no intervention at this time, just observation for now  X-ray monitoring daily    ==================ID===================================  Covid pneumonia  -leukocytosis resolved  -plan as above     ================= Nephro================================  -No issues   -monitor lytes, SCr   -monitor I/Os,   -Net positive , Dark urine ,  -Start Lasix 40mg Daily and HCTZ 50mg     =================GI====================================  Transaminitis:   likely secondary to Covid   - and  on presentation  -Improved  -continue to monitor,  -  hepatitis panel -ve    diet:   ng tube and tube feed  bowel regimen,   Was having loose stools,     ================ Heme==================================  Elevated d-dimer: likely secondary to Covid  -d-dimer 423 on presentation, now wnl  -continue prophylactic Lovenox 40 mg bid    =================Endocrine===============================  Prediabetes:    -A1c  5.8  -BS controlled  -continue HSS  -monitor FS while on steroids    Abnormal TSH:  -TSH level noted 0.26,   -TSH 0.37 and   - Free T4 1.82      ================= Skin/Catheters============================  No rashes. Peripheral IV lines.   central line, a-line, ng tube present 3/29, Will change today 4/6    - =================Prophylaxis =============================  Lovenox for DVT proph  Protonix for  GI proph    ==================GOC==================================   FULL CODE    Spoke with patient/s wife Rosa and grand daughter, she wants her son to be the POC   ASSESSMENT AND PLAN:  55M, no PMH, PSH appy and moody 1990s presented 3/14 with x9 days worsening cough, subjective fevers, and SOB, with x2-3 days dysuria and central, non-radiating, constant CP. Admitted to [facility] for acute hypoxic respiratory failure s/t covid pneumonia, ICU consulted for increasing work of breathing on 15 lpm NRM.     1. Acute hypoxic respiratory failure  2. ARDS 2/2 Covid pneumonia  3. Transaminitis  4. Prediabetes  5. Abnormal TSH    =================== Neuro============================  Alert and oriented x 3 at baseline     intubated and sedated 3/29   Propofol and fentanyl, Restarted on Versed for vent synchrony   Add Ativan 2mg PO to help taper off Versed       ================= Cardiovascular==========================  Hypertension  not on any pressor support, maintaining map above 60\  s/p Labetalol 20mg IVP for high BP      TACHYCARDIA:  pT HR in 80s   will keep monitoring         ================- Pulm=================================  Acute hypoxic respiratory failure: secondary to covid pneumonia  -was on  HFNr then got intubated 3/29  -D-dimer initially elevated on presentation, now wnl  -Remdesivir was discontinued due to positive antibodies   - add albuterol prn   -procalcitonin wnl, d-dimer in 200s  -started pulse steroids for 3 days - 250mg solumedrol bid- completed3/27  - solumedrol 40 once daily  - Finished Dexa     #Pneumothorax and pneumomediastinum:  X-ray chest: tiny left apical pneumothorax  Thoracic surgery consulted recommended no intervention at this time, just observation for now  X-ray monitoring daily    ==================ID===================================  Covid pneumonia  -leukocytosis resolved  -plan as above     ================= Nephro================================  -No issues   -monitor lytes, SCr   -monitor I/Os,   -Net positive , Dark urine ,  -Start Lasix 40mg Daily and HCTZ 50mg     =================GI====================================  Transaminitis:   likely secondary to Covid   - and  on presentation  -Improved  -continue to monitor,  -  hepatitis panel -ve    diet:   ng tube and tube feed  bowel regimen,   Was having loose stools,     ================ Heme==================================  Elevated d-dimer: likely secondary to Covid  -d-dimer 423 on presentation, now wnl  -continue prophylactic Lovenox 40 mg bid    =================Endocrine===============================  Prediabetes:    -A1c  5.8  -BS controlled  -continue HSS  -monitor FS while on steroids    Abnormal TSH:  -TSH level noted 0.26,   -TSH 0.37 and   - Free T4 1.82      ================= Skin/Catheters============================  No rashes. Peripheral IV lines.   central line, a-line, ng tube present 3/29, Will change today 4/6    - =================Prophylaxis =============================  Lovenox for DVT proph  Protonix for  GI proph    ==================GOC==================================   FULL CODE    Spoke with patient/s wife Rosa and grand daughter, she wants her son to be the POC

## 2021-04-06 NOTE — PROGRESS NOTE ADULT - SUBJECTIVE AND OBJECTIVE BOX
Patient is a 55y old  Male who presents with a chief complaint of SOB (06 Apr 2021 07:09)  patient remains sedated, intubated on mechanical ventilation. Currently on 60% FIO2. Sat 90%  c  INTERVAL HPI/OVERNIGHT EVENTS:      VITAL SIGNS:  T(F): 99.6 (04-06-21 @ 04:00)  HR: 120 (04-06-21 @ 12:12)  BP: --  RR: 14 (04-06-21 @ 11:00)  SpO2: 91% (04-06-21 @ 12:12)  Wt(kg): --  I&O's Detail    05 Apr 2021 07:01  -  06 Apr 2021 07:00  --------------------------------------------------------  IN:    Enteral Tube Flush: 50 mL    FentaNYL: 763 mL    Glucerna 1.5: 525 mL    Propofol: 526 mL  Total IN: 1864 mL    OUT:    Indwelling Catheter - Urethral (mL): 1130 mL  Total OUT: 1130 mL    Total NET: 734 mL      06 Apr 2021 07:01  -  06 Apr 2021 12:33  --------------------------------------------------------  IN:    Glucerna 1.5: 75 mL  Total IN: 75 mL    OUT:    Indwelling Catheter - Urethral (mL): 325 mL  Total OUT: 325 mL    Total NET: -250 mL        Mode: AC/ CMV (Assist Control/ Continuous Mandatory Ventilation)  RR (machine): 24  TV (machine): 350  FiO2: 70  PEEP: 10  ITime: 0.8  MAP: 15  PIP: 33        REVIEW OF SYSTEMS:    CONSTITUTIONAL:  No fevers, chills, sweats    HEENT:  Eyes:  No diplopia or blurred vision. ENT:  No earache, sore throat or runny nose.    CARDIOVASCULAR:  No pressure, squeezing, tightness, or heaviness about the chest; no palpitations.    RESPIRATORY:  Per HPI    GASTROINTESTINAL:  No abdominal pain, nausea, vomiting or diarrhea.    GENITOURINARY:  No dysuria, frequency or urgency.    NEUROLOGIC:  No paresthesias, fasciculations, seizures or weakness.    PSYCHIATRIC:  No disorder of thought or mood.      PHYSICAL EXAM:    Constitutional: Well developed and nourished  Eyes:Perrla  ENMT: normal  Neck:supple  Respiratory: good air entry  Cardiovascular: S1 S2 regular  Gastrointestinal: Soft, Non tender  Extremities: No edema  Vascular:normal  Neurological:Awake, alert,Ox3  Musculoskeletal:Normal      MEDICATIONS  (STANDING):  ALBUTerol    90 MICROgram(s) HFA Inhaler 2 Puff(s) Inhalation every 6 hours  ascorbic acid 1000 milliGRAM(s) Oral daily  aspirin  chewable 81 milliGRAM(s) Oral daily  bisacodyl Suppository 10 milliGRAM(s) Rectal daily  chlorhexidine 0.12% Liquid 15 milliLiter(s) Oral Mucosa two times a day  chlorhexidine 2% Cloths 1 Application(s) Topical <User Schedule>  enoxaparin Injectable 40 milliGRAM(s) SubCutaneous two times a day  fentaNYL   Infusion. 4 MICROgram(s)/kG/Hr (33.5 mL/Hr) IV Continuous <Continuous>  hydrochlorothiazide 25 milliGRAM(s) Oral daily  insulin lispro (ADMELOG) corrective regimen sliding scale   SubCutaneous every 6 hours  LORazepam     Tablet 2 milliGRAM(s) Oral two times a day  methylPREDNISolone sodium succinate Injectable 40 milliGRAM(s) IV Push daily  midazolam Infusion 0.02 mG/kG/Hr (1.68 mL/Hr) IV Continuous <Continuous>  pantoprazole   Suspension 40 milliGRAM(s) Oral daily  polyethylene glycol 3350 17 Gram(s) Oral daily  propofol Infusion 30.007 MICROgram(s)/kG/Min (15.1 mL/Hr) IV Continuous <Continuous>  senna 2 Tablet(s) Oral at bedtime    MEDICATIONS  (PRN):  acetaminophen    Suspension .. 650 milliGRAM(s) Oral every 6 hours PRN Temp greater or equal to 38C (100.4F)  sodium chloride 0.9% lock flush 10 milliLiter(s) IV Push every 1 hour PRN Pre/post blood products, medications, blood draw, and to maintain line patency      Allergies    No Known Allergies    Intolerances        LABS:                        10.9   11.53 )-----------( 145      ( 06 Apr 2021 05:03 )             33.9     04-06    145  |  106  |  31<H>  ----------------------------<  100<H>  4.1   |  35<H>  |  0.37<L>    Ca    8.0<L>      06 Apr 2021 05:03  Phos  1.8     04-06  Mg     2.1     04-06    TPro  5.4<L>  /  Alb  1.5<L>  /  TBili  0.4  /  DBili  x   /  AST  39  /  ALT  62<H>  /  AlkPhos  97  04-06    PT/INR - ( 06 Apr 2021 05:03 )   PT: 13.2 sec;   INR: 1.12 ratio         PTT - ( 06 Apr 2021 05:03 )  PTT:29.9 sec    ABG - ( 06 Apr 2021 04:26 )  pH, Arterial: 7.42  pH, Blood: x     /  pCO2: 56    /  pO2: 57    / HCO3: 35    / Base Excess: 9.0   /  SaO2: 89                    CAPILLARY BLOOD GLUCOSE      POCT Blood Glucose.: 116 mg/dL (06 Apr 2021 11:55)  POCT Blood Glucose.: 97 mg/dL (06 Apr 2021 04:48)  POCT Blood Glucose.: 98 mg/dL (05 Apr 2021 23:00)  POCT Blood Glucose.: 127 mg/dL (05 Apr 2021 16:46)    pro-bnp -- 04-06 @ 05:03     d-dimer 363  04-06 @ 05:03  pro-bnp -- 04-03 @ 03:48     d-dimer 1250  04-03 @ 03:48  pro-bnp -- 03-31 @ 04:14     d-dimer 386  03-31 @ 04:14      RADIOLOGY & ADDITIONAL TESTS:    CXR:  x< from: Xray Chest 1 View- PORTABLE-Routine (Xray Chest 1 View- PORTABLE-Routine in AM.) (04.06.21 @ 07:33) >    FINDINGS/  IMPRESSION:  Study limited due to rotation.    Right central line, ET tube and feeding tube again noted. No pneumothorax.    Opacities lung bases without significant change    Heart size cannot be accurately assessed in this projection.        < end of copied text >    Ct scan chest:    ekg;    echo:

## 2021-04-06 NOTE — PROGRESS NOTE ADULT - SUBJECTIVE AND OBJECTIVE BOX
Patient is a 55y old  Male who presents with a chief complaint of SOB (05 Apr 2021 09:23)    pt seen in icu [ x ], reg med floor [   ], bed [ x ], chair at bedside [   ], a+o x3 [  ], sedated [x  ],  nad [x  ]    andrea [ x ], ngt [x  ], et tube [ x ], cent line [x  ],        Allergies    No Known Allergies        Vitals    T(F): 99.6 (04-06-21 @ 04:00), Max: 99.6 (04-06-21 @ 04:00)  HR: 83 (04-06-21 @ 06:00) (72 - 91)  BP: --  RR: 31 (04-06-21 @ 06:00) (19 - 38)  SpO2: 92% (04-06-21 @ 06:00) (90% - 96%)  Wt(kg): --  CAPILLARY BLOOD GLUCOSE      POCT Blood Glucose.: 97 mg/dL (06 Apr 2021 04:48)      Labs                          10.9   11.53 )-----------( 145      ( 06 Apr 2021 05:03 )             33.9       04-06    145  |  106  |  31<H>  ----------------------------<  100<H>  4.1   |  35<H>  |  0.37<L>    Ca    8.0<L>      06 Apr 2021 05:03  Phos  1.8     04-06  Mg     2.1     04-06    TPro  5.4<L>  /  Alb  1.5<L>  /  TBili  0.4  /  DBili  x   /  AST  39  /  ALT  62<H>  /  AlkPhos  97  04-06            .Urine Clean Catch (Midstream)  03-15 @ 00:52   No growth  --  --          Radiology Results      Meds    MEDICATIONS  (STANDING):  ALBUTerol    90 MICROgram(s) HFA Inhaler 2 Puff(s) Inhalation every 6 hours  ascorbic acid 1000 milliGRAM(s) Oral daily  aspirin  chewable 81 milliGRAM(s) Oral daily  bisacodyl Suppository 10 milliGRAM(s) Rectal daily  chlorhexidine 0.12% Liquid 15 milliLiter(s) Oral Mucosa two times a day  chlorhexidine 2% Cloths 1 Application(s) Topical <User Schedule>  enoxaparin Injectable 40 milliGRAM(s) SubCutaneous two times a day  fentaNYL   Infusion. 4 MICROgram(s)/kG/Hr (33.5 mL/Hr) IV Continuous <Continuous>  insulin lispro (ADMELOG) corrective regimen sliding scale   SubCutaneous every 6 hours  methylPREDNISolone sodium succinate Injectable 40 milliGRAM(s) IV Push daily  pantoprazole   Suspension 40 milliGRAM(s) Oral daily  polyethylene glycol 3350 17 Gram(s) Oral daily  propofol Infusion 30.007 MICROgram(s)/kG/Min (15.1 mL/Hr) IV Continuous <Continuous>  senna 2 Tablet(s) Oral at bedtime      MEDICATIONS  (PRN):  acetaminophen    Suspension .. 650 milliGRAM(s) Oral every 6 hours PRN Temp greater or equal to 38C (100.4F)  sodium chloride 0.9% lock flush 10 milliLiter(s) IV Push every 1 hour PRN Pre/post blood products, medications, blood draw, and to maintain line patency      Physical Exam    Neuro :  no focal deficits  Respiratory: CTA B/L  CV: RRR, S1S2, no murmurs,   Abdominal: Soft, NT, ND +BS,  Extremities: No edema, + peripheral pulses      ASSESSMENT    Hypoxemia 2nd to covid pna   transaminitis  prediabetes  h/o appendectomy  cholecystectomy        PLAN    contact and airborne isolation  d/c remdesevir given covid ab positive noted   completed dexamethasone   started pulse steroids for 3 days - 250mg solumedrol bid now tapered to 40mg daily  cont asa, vit c,    cont albuterol inhaler   pulm f/u  procalcitonin, D-dimer, crp, ldh, ferritin, lactate noted ,    tmx 100.2  cont tylenol prn,   cont robitussin prn   pt on fentanyl, midazolam and propofol drip  s/p intubation 3/29/21  O2 sat (93% - 99%) mech vent  O2 via mech vent and taper fio2 as tolerated   xray 3/19/21 with pneumomediastinum  rept cxr with New trace right apical pneumothorax. New mild left apical pneumothorax. Grossly stable small pneumomediastinum.  Soft tissue emphysema at the neck bases bilaterally. Grossly stable bilateral pulmonary infiltrates noted.   cxr 2/24 with No evidence of pneumothorax can be appreciated on the available image. This may be related to patient positioning. Evidence of pneumomediastinum and subcutaneous emphysema in the lower neck is again noted. There are patchy bibasilar infiltrates and elevated right hemidiaphragm noted.   cxr 3/29/21 with No significant change bilateral infiltrates. There is a small simple left apical pneumothorax. No significant pleural effusion. Bilateral subcutaneous emphysema similar to prior.   thoracic surg f/u   pt intubated 6AM 3/29/21, XRs on 7AM and 5PM with no obvious increase of ptx  no immediate need for CT at this time,   recommend close monitoring with serial CXRs   cxr 4/4/21 with Improving bilateral airspace disease noted above  lispro ss   prognosis poor  cont current meds  cont mgmt as per icu     Patient is a 55y old  Male who presents with a chief complaint of SOB (05 Apr 2021 09:23)    pt seen in icu [ x ], reg med floor [   ], bed [ x ], chair at bedside [   ], a+o x3 [  ], sedated [x  ],  nad [x  ]    andrea [ x ], ngt [x  ], et tube [ x ], cent line [x  ],        Allergies    No Known Allergies        Vitals    T(F): 99.6 (04-06-21 @ 04:00), Max: 99.6 (04-06-21 @ 04:00)  HR: 83 (04-06-21 @ 06:00) (72 - 91)  BP: --  RR: 31 (04-06-21 @ 06:00) (19 - 38)  SpO2: 92% (04-06-21 @ 06:00) (90% - 96%)  Wt(kg): --  CAPILLARY BLOOD GLUCOSE      POCT Blood Glucose.: 97 mg/dL (06 Apr 2021 04:48)      Labs                          10.9   11.53 )-----------( 145      ( 06 Apr 2021 05:03 )             33.9       04-06    145  |  106  |  31<H>  ----------------------------<  100<H>  4.1   |  35<H>  |  0.37<L>    Ca    8.0<L>      06 Apr 2021 05:03  Phos  1.8     04-06  Mg     2.1     04-06    TPro  5.4<L>  /  Alb  1.5<L>  /  TBili  0.4  /  DBili  x   /  AST  39  /  ALT  62<H>  /  AlkPhos  97  04-06            .Urine Clean Catch (Midstream)  03-15 @ 00:52   No growth  --  --          Radiology Results      Meds    MEDICATIONS  (STANDING):  ALBUTerol    90 MICROgram(s) HFA Inhaler 2 Puff(s) Inhalation every 6 hours  ascorbic acid 1000 milliGRAM(s) Oral daily  aspirin  chewable 81 milliGRAM(s) Oral daily  bisacodyl Suppository 10 milliGRAM(s) Rectal daily  chlorhexidine 0.12% Liquid 15 milliLiter(s) Oral Mucosa two times a day  chlorhexidine 2% Cloths 1 Application(s) Topical <User Schedule>  enoxaparin Injectable 40 milliGRAM(s) SubCutaneous two times a day  fentaNYL   Infusion. 4 MICROgram(s)/kG/Hr (33.5 mL/Hr) IV Continuous <Continuous>  insulin lispro (ADMELOG) corrective regimen sliding scale   SubCutaneous every 6 hours  methylPREDNISolone sodium succinate Injectable 40 milliGRAM(s) IV Push daily  pantoprazole   Suspension 40 milliGRAM(s) Oral daily  polyethylene glycol 3350 17 Gram(s) Oral daily  propofol Infusion 30.007 MICROgram(s)/kG/Min (15.1 mL/Hr) IV Continuous <Continuous>  senna 2 Tablet(s) Oral at bedtime      MEDICATIONS  (PRN):  acetaminophen    Suspension .. 650 milliGRAM(s) Oral every 6 hours PRN Temp greater or equal to 38C (100.4F)  sodium chloride 0.9% lock flush 10 milliLiter(s) IV Push every 1 hour PRN Pre/post blood products, medications, blood draw, and to maintain line patency      Physical Exam    Neuro :  no focal deficits  Respiratory: CTA B/L  CV: RRR, S1S2, no murmurs,   Abdominal: Soft, NT, ND +BS,  Extremities: No edema, + peripheral pulses      ASSESSMENT    Hypoxemia 2nd to covid pna   transaminitis  prediabetes  h/o appendectomy  cholecystectomy        PLAN    contact and airborne isolation  d/c remdesevir given covid ab positive noted   completed dexamethasone   started pulse steroids for 3 days - 250mg solumedrol bid now tapered to 40mg daily  cont asa, vit c,    cont albuterol inhaler   pulm f/u  procalcitonin, D-dimer, crp, ldh, ferritin, lactate noted ,    tmx 99.6  cont tylenol prn,   cont robitussin prn   pt on fentanyl, and propofol drip  s/p intubation 3/29/21  O2 sat (90% - 96%) mech vent  O2 via mech vent and taper fio2 as tolerated   xray 3/19/21 with pneumomediastinum  rept cxr with New trace right apical pneumothorax. New mild left apical pneumothorax. Grossly stable small pneumomediastinum.  Soft tissue emphysema at the neck bases bilaterally. Grossly stable bilateral pulmonary infiltrates noted.   cxr 2/24 with No evidence of pneumothorax can be appreciated on the available image. This may be related to patient positioning. Evidence of pneumomediastinum and subcutaneous emphysema in the lower neck is again noted. There are patchy bibasilar infiltrates and elevated right hemidiaphragm noted.   cxr 3/29/21 with No significant change bilateral infiltrates. There is a small simple left apical pneumothorax. No significant pleural effusion. Bilateral subcutaneous emphysema similar to prior.   thoracic surg f/u   pt intubated 6AM 3/29/21, XRs on 7AM and 5PM with no obvious increase of ptx  no immediate need for CT at this time,   recommend close monitoring with serial CXRs   cxr 4/4/21 with Improving bilateral airspace disease noted   lispro ss   prognosis poor  cont current meds  cont mgmt as per icu

## 2021-04-07 LAB
ALBUMIN SERPL ELPH-MCNC: 1.5 G/DL — LOW (ref 3.5–5)
ALP SERPL-CCNC: 106 U/L — SIGNIFICANT CHANGE UP (ref 40–120)
ALT FLD-CCNC: 63 U/L DA — HIGH (ref 10–60)
ANION GAP SERPL CALC-SCNC: 2 MMOL/L — LOW (ref 5–17)
AST SERPL-CCNC: 39 U/L — SIGNIFICANT CHANGE UP (ref 10–40)
BASE EXCESS BLDA CALC-SCNC: 13.8 MMOL/L — HIGH (ref -2–3)
BASE EXCESS BLDA CALC-SCNC: 15.3 MMOL/L — HIGH (ref -2–3)
BASE EXCESS BLDA CALC-SCNC: 6.5 MMOL/L — HIGH (ref -2–3)
BASOPHILS # BLD AUTO: 0.04 K/UL — SIGNIFICANT CHANGE UP (ref 0–0.2)
BASOPHILS NFR BLD AUTO: 0.3 % — SIGNIFICANT CHANGE UP (ref 0–2)
BILIRUB SERPL-MCNC: 0.6 MG/DL — SIGNIFICANT CHANGE UP (ref 0.2–1.2)
BLOOD GAS COMMENTS ARTERIAL: SIGNIFICANT CHANGE UP
BUN SERPL-MCNC: 25 MG/DL — HIGH (ref 7–18)
CALCIUM SERPL-MCNC: 7.9 MG/DL — LOW (ref 8.4–10.5)
CHLORIDE SERPL-SCNC: 106 MMOL/L — SIGNIFICANT CHANGE UP (ref 96–108)
CO2 SERPL-SCNC: 38 MMOL/L — HIGH (ref 22–31)
CREAT SERPL-MCNC: 0.36 MG/DL — LOW (ref 0.5–1.3)
CRP SERPL-MCNC: 198 MG/L — HIGH
EOSINOPHIL # BLD AUTO: 0.04 K/UL — SIGNIFICANT CHANGE UP (ref 0–0.5)
EOSINOPHIL NFR BLD AUTO: 0.3 % — SIGNIFICANT CHANGE UP (ref 0–6)
FERRITIN SERPL-MCNC: 1313 NG/ML — HIGH (ref 30–400)
GLUCOSE BLDC GLUCOMTR-MCNC: 122 MG/DL — HIGH (ref 70–99)
GLUCOSE BLDC GLUCOMTR-MCNC: 163 MG/DL — HIGH (ref 70–99)
GLUCOSE BLDC GLUCOMTR-MCNC: 55 MG/DL — LOW (ref 70–99)
GLUCOSE SERPL-MCNC: 80 MG/DL — SIGNIFICANT CHANGE UP (ref 70–99)
HCO3 BLDA-SCNC: 38 MMOL/L — HIGH (ref 21–28)
HCO3 BLDA-SCNC: 44 MMOL/L — HIGH (ref 21–28)
HCO3 BLDA-SCNC: 44 MMOL/L — HIGH (ref 21–28)
HCT VFR BLD CALC: 37.9 % — LOW (ref 39–50)
HGB BLD-MCNC: 11.4 G/DL — LOW (ref 13–17)
HOROWITZ INDEX BLDA+IHG-RTO: 50 — SIGNIFICANT CHANGE UP
HOROWITZ INDEX BLDA+IHG-RTO: 50 — SIGNIFICANT CHANGE UP
HOROWITZ INDEX BLDA+IHG-RTO: 80 — SIGNIFICANT CHANGE UP
IMM GRANULOCYTES NFR BLD AUTO: 5 % — HIGH (ref 0–1.5)
LYMPHOCYTES # BLD AUTO: 0.85 K/UL — LOW (ref 1–3.3)
LYMPHOCYTES # BLD AUTO: 6.7 % — LOW (ref 13–44)
MAGNESIUM SERPL-MCNC: 2.1 MG/DL — SIGNIFICANT CHANGE UP (ref 1.6–2.6)
MCHC RBC-ENTMCNC: 30.1 GM/DL — LOW (ref 32–36)
MCHC RBC-ENTMCNC: 31.3 PG — SIGNIFICANT CHANGE UP (ref 27–34)
MCV RBC AUTO: 104.1 FL — HIGH (ref 80–100)
MONOCYTES # BLD AUTO: 0.31 K/UL — SIGNIFICANT CHANGE UP (ref 0–0.9)
MONOCYTES NFR BLD AUTO: 2.5 % — SIGNIFICANT CHANGE UP (ref 2–14)
NEUTROPHILS # BLD AUTO: 10.73 K/UL — HIGH (ref 1.8–7.4)
NEUTROPHILS NFR BLD AUTO: 85.2 % — HIGH (ref 43–77)
NRBC # BLD: 0 /100 WBCS — SIGNIFICANT CHANGE UP (ref 0–0)
PCO2 BLDA: 77 MMHG — CRITICAL HIGH (ref 35–48)
PCO2 BLDA: 83 MMHG — CRITICAL HIGH (ref 35–48)
PCO2 BLDA: 92 MMHG — CRITICAL HIGH (ref 35–48)
PH BLDA: 7.22 — LOW (ref 7.35–7.45)
PH BLDA: 7.33 — LOW (ref 7.35–7.45)
PH BLDA: 7.37 — SIGNIFICANT CHANGE UP (ref 7.35–7.45)
PHOSPHATE SERPL-MCNC: 5.8 MG/DL — HIGH (ref 2.5–4.5)
PLATELET # BLD AUTO: 136 K/UL — LOW (ref 150–400)
PO2 BLDA: 132 MMHG — HIGH (ref 83–108)
PO2 BLDA: 63 MMHG — LOW (ref 83–108)
PO2 BLDA: 69 MMHG — LOW (ref 83–108)
POTASSIUM SERPL-MCNC: 4.6 MMOL/L — SIGNIFICANT CHANGE UP (ref 3.5–5.3)
POTASSIUM SERPL-SCNC: 4.6 MMOL/L — SIGNIFICANT CHANGE UP (ref 3.5–5.3)
PROCALCITONIN SERPL-MCNC: 1.3 NG/ML — HIGH (ref 0.02–0.1)
PROT SERPL-MCNC: 5.3 G/DL — LOW (ref 6–8.3)
RBC # BLD: 3.64 M/UL — LOW (ref 4.2–5.8)
RBC # FLD: 12.7 % — SIGNIFICANT CHANGE UP (ref 10.3–14.5)
SAO2 % BLDA: 100 % — SIGNIFICANT CHANGE UP
SAO2 % BLDA: 94 % — SIGNIFICANT CHANGE UP
SAO2 % BLDA: 96 % — SIGNIFICANT CHANGE UP
SODIUM SERPL-SCNC: 146 MMOL/L — HIGH (ref 135–145)
WBC # BLD: 12.6 K/UL — HIGH (ref 3.8–10.5)
WBC # FLD AUTO: 12.6 K/UL — HIGH (ref 3.8–10.5)

## 2021-04-07 PROCEDURE — 71045 X-RAY EXAM CHEST 1 VIEW: CPT | Mod: 26

## 2021-04-07 RX ORDER — MINERAL OIL
133 OIL (ML) MISCELLANEOUS DAILY
Refills: 0 | Status: DISCONTINUED | OUTPATIENT
Start: 2021-04-07 | End: 2021-04-10

## 2021-04-07 RX ORDER — HYDROCHLOROTHIAZIDE 25 MG
50 TABLET ORAL DAILY
Refills: 0 | Status: DISCONTINUED | OUTPATIENT
Start: 2021-04-07 | End: 2021-04-07

## 2021-04-07 RX ORDER — FUROSEMIDE 40 MG
40 TABLET ORAL
Refills: 0 | Status: DISCONTINUED | OUTPATIENT
Start: 2021-04-07 | End: 2021-04-08

## 2021-04-07 RX ORDER — HYDROCHLOROTHIAZIDE 25 MG
50 TABLET ORAL DAILY
Refills: 0 | Status: DISCONTINUED | OUTPATIENT
Start: 2021-04-07 | End: 2021-04-13

## 2021-04-07 RX ORDER — FUROSEMIDE 40 MG
40 TABLET ORAL DAILY
Refills: 0 | Status: DISCONTINUED | OUTPATIENT
Start: 2021-04-07 | End: 2021-04-07

## 2021-04-07 RX ORDER — FENTANYL CITRATE 50 UG/ML
5 INJECTION INTRAVENOUS
Qty: 2500 | Refills: 0 | Status: DISCONTINUED | OUTPATIENT
Start: 2021-04-07 | End: 2021-04-10

## 2021-04-07 RX ORDER — DEXTROSE 50 % IN WATER 50 %
100 SYRINGE (ML) INTRAVENOUS ONCE
Refills: 0 | Status: COMPLETED | OUTPATIENT
Start: 2021-04-07 | End: 2021-04-07

## 2021-04-07 RX ORDER — ACETAMINOPHEN 500 MG
1000 TABLET ORAL ONCE
Refills: 0 | Status: COMPLETED | OUTPATIENT
Start: 2021-04-07 | End: 2021-04-07

## 2021-04-07 RX ADMIN — Medication 50 MILLIGRAM(S): at 17:16

## 2021-04-07 RX ADMIN — CHLORHEXIDINE GLUCONATE 15 MILLILITER(S): 213 SOLUTION TOPICAL at 05:15

## 2021-04-07 RX ADMIN — Medication 30 MILLIGRAM(S): at 17:21

## 2021-04-07 RX ADMIN — MIDAZOLAM HYDROCHLORIDE 1.68 MG/KG/HR: 1 INJECTION, SOLUTION INTRAMUSCULAR; INTRAVENOUS at 06:03

## 2021-04-07 RX ADMIN — PANTOPRAZOLE SODIUM 40 MILLIGRAM(S): 20 TABLET, DELAYED RELEASE ORAL at 11:21

## 2021-04-07 RX ADMIN — ENOXAPARIN SODIUM 40 MILLIGRAM(S): 100 INJECTION SUBCUTANEOUS at 17:16

## 2021-04-07 RX ADMIN — PROPOFOL 15.1 MICROGRAM(S)/KG/MIN: 10 INJECTION, EMULSION INTRAVENOUS at 06:04

## 2021-04-07 RX ADMIN — Medication 2 MILLIGRAM(S): at 17:17

## 2021-04-07 RX ADMIN — Medication 100 MILLILITER(S): at 06:02

## 2021-04-07 RX ADMIN — POLYETHYLENE GLYCOL 3350 17 GRAM(S): 17 POWDER, FOR SOLUTION ORAL at 11:23

## 2021-04-07 RX ADMIN — FENTANYL CITRATE 41.9 MICROGRAM(S)/KG/HR: 50 INJECTION INTRAVENOUS at 11:21

## 2021-04-07 RX ADMIN — ENOXAPARIN SODIUM 40 MILLIGRAM(S): 100 INJECTION SUBCUTANEOUS at 05:14

## 2021-04-07 RX ADMIN — SENNA PLUS 2 TABLET(S): 8.6 TABLET ORAL at 21:50

## 2021-04-07 RX ADMIN — Medication 1000 MILLIGRAM(S): at 11:22

## 2021-04-07 RX ADMIN — Medication 133 MILLILITER(S): at 11:21

## 2021-04-07 RX ADMIN — ALBUTEROL 2 PUFF(S): 90 AEROSOL, METERED ORAL at 20:56

## 2021-04-07 RX ADMIN — PROPOFOL 15.1 MICROGRAM(S)/KG/MIN: 10 INJECTION, EMULSION INTRAVENOUS at 01:30

## 2021-04-07 RX ADMIN — Medication 40 MILLIGRAM(S): at 05:14

## 2021-04-07 RX ADMIN — Medication 400 MILLIGRAM(S): at 01:45

## 2021-04-07 RX ADMIN — FENTANYL CITRATE 41.9 MICROGRAM(S)/KG/HR: 50 INJECTION INTRAVENOUS at 17:22

## 2021-04-07 RX ADMIN — FENTANYL CITRATE 41.9 MICROGRAM(S)/KG/HR: 50 INJECTION INTRAVENOUS at 05:26

## 2021-04-07 RX ADMIN — CHLORHEXIDINE GLUCONATE 15 MILLILITER(S): 213 SOLUTION TOPICAL at 17:16

## 2021-04-07 RX ADMIN — MIDAZOLAM HYDROCHLORIDE 1.68 MG/KG/HR: 1 INJECTION, SOLUTION INTRAMUSCULAR; INTRAVENOUS at 14:30

## 2021-04-07 RX ADMIN — MIDAZOLAM HYDROCHLORIDE 1.68 MG/KG/HR: 1 INJECTION, SOLUTION INTRAMUSCULAR; INTRAVENOUS at 19:50

## 2021-04-07 RX ADMIN — Medication 40 MILLIGRAM(S): at 17:17

## 2021-04-07 RX ADMIN — FENTANYL CITRATE 41.9 MICROGRAM(S)/KG/HR: 50 INJECTION INTRAVENOUS at 23:47

## 2021-04-07 RX ADMIN — Medication 2 MILLIGRAM(S): at 05:14

## 2021-04-07 RX ADMIN — CHLORHEXIDINE GLUCONATE 1 APPLICATION(S): 213 SOLUTION TOPICAL at 05:14

## 2021-04-07 RX ADMIN — Medication 1000 MILLIGRAM(S): at 02:24

## 2021-04-07 RX ADMIN — ALBUTEROL 2 PUFF(S): 90 AEROSOL, METERED ORAL at 08:52

## 2021-04-07 RX ADMIN — ALBUTEROL 2 PUFF(S): 90 AEROSOL, METERED ORAL at 04:35

## 2021-04-07 RX ADMIN — Medication 81 MILLIGRAM(S): at 14:10

## 2021-04-07 RX ADMIN — ALBUTEROL 2 PUFF(S): 90 AEROSOL, METERED ORAL at 15:58

## 2021-04-07 RX ADMIN — Medication 10 MILLIGRAM(S): at 11:22

## 2021-04-07 RX ADMIN — Medication 25 MILLIGRAM(S): at 05:14

## 2021-04-07 NOTE — PROGRESS NOTE ADULT - ASSESSMENT
Assessment and Recommendation:   Problem/Recommendation - 1:  Problem: COVID-19. Recommendation: isolation precautions  cont mechanical ventilation  taper FIO2 as tolerated  Wean as tolerated  pulmonary toilet  NGT nutrition  ICU management.   Monitor oxygen sat  Monitor LFT, LDH, CRP, D-Dimer, Ferritin and procalcitonin  Vit C, D and zinc supp  Montelukast 10 mgs po Qhs  IV steroids.  Daily CXR   DVT and GI PPX     Problem/Recommendation - 2:  ·  Problem: UTI (urinary tract infection).  Recommendation: F.U cultures   Off Antibiotics.     Problem/Recommendation - 3:  ·  Problem: Chest pain.  Recommendation: likely related to Covid-19 infection.     Problem/Recommendation - 4:  ·  Problem: Transaminitis.  Recommendation: monitor LFTs  GI F/U     Problem/Recommendation - 5:  ·  Problem: Pneumothorax.  Recommendation: New CXR showed no pneumothorax  Thoracic sx eval noted.  Continue to f/u CXR   Management per ICU

## 2021-04-07 NOTE — PROGRESS NOTE ADULT - ATTENDING COMMENTS
55 yr old  man , non smoker with  moody 1990s presented 3/14 with x9 days worsening cough, subjective fevers, and SOB, with x2-3 days dysuria and central, non-radiating, constant CP. Admitted to medicine unit  for acute hypoxic respiratory failure secondary to pna from covid-19 infection .     Assessment:  1. Acute hypoxic respiratory failure  2 Covid-19 infection   3. Transaminitis  4. Prediabetes  5. Bilateral pneumothorax      Plan   -pat intubated AM 3/29   -small apical left PTX with subcutaneous emphysema   -lower PEEP   -PTC  improved   -TLX changed 4/6  -diuresis   -change iv versed to po/ngt  -adjust vent as per ABG  -Repeated CXR  is worst and monitor respiratory status  -isolation : contact and air borne   -monitor biomarkers daily, trending down   -trial of semi- pulse steroid .. solumedrol 250 bid x 3/3 days   -dvt/gi prophy  -not a candidate for remdesivir due to covid-19 antibodies and elevated LFT  -not a candidate for Tociluzimab due to elevated LFT  -hemodynamic monitoring   -NGT .

## 2021-04-07 NOTE — PROGRESS NOTE ADULT - ASSESSMENT
ASSESSMENT AND PLAN:  55M, no PMH, PSH appy and moody 1990s presented 3/14 with x9 days worsening cough, subjective fevers, and SOB, with x2-3 days dysuria and central, non-radiating, constant CP. Admitted to [facility] for acute hypoxic respiratory failure s/t covid pneumonia, ICU consulted for increasing work of breathing on 15 lpm NRM.     1. Acute hypoxic respiratory failure  2. ARDS 2/2 Covid pneumonia  3. Transaminitis  4. Prediabetes  5. Abnormal TSH    =================== Neuro============================  Alert and oriented x 3 at baseline     intubated and sedated 3/29   Propofol and fentanyl, Restarted on Versed for vent synchrony   Add Ativan 2mg PO to help taper off Versed       ================= Cardiovascular==========================  Hypertension  not on any pressor support, maintaining map above 60\  s/p Labetalol 20mg IVP for high BP      TACHYCARDIA:  pT HR in 80s   will keep monitoring         ================- Pulm=================================  Acute hypoxic respiratory failure: secondary to covid pneumonia  -was on  HFNr then got intubated 3/29  -D-dimer initially elevated on presentation, now wnl  -Remdesivir was discontinued due to positive antibodies   - add albuterol prn   -procalcitonin wnl, d-dimer in 200s  -started pulse steroids for 3 days - 250mg solumedrol bid- completed3/27  - solumedrol 40 once daily  - Finished Dexa     #Pneumothorax and pneumomediastinum:  X-ray chest: tiny left apical pneumothorax  Thoracic surgery consulted recommended no intervention at this time, just observation for now  X-ray monitoring daily    ==================ID===================================  Covid pneumonia  -leukocytosis resolved  -plan as above     ================= Nephro================================  -No issues   -monitor lytes, SCr   -monitor I/Os,   -Net positive , Dark urine ,  -Start Lasix 40mg Daily and HCTZ 50mg     =================GI====================================  Transaminitis:   likely secondary to Covid   - and  on presentation  -Improved  -continue to monitor,  -  hepatitis panel -ve    diet:   ng tube and tube feed  bowel regimen,   Was having loose stools,     ================ Heme==================================  Elevated d-dimer: likely secondary to Covid  -d-dimer 423 on presentation, now wnl  -continue prophylactic Lovenox 40 mg bid    =================Endocrine===============================  Prediabetes:    -A1c  5.8  -BS controlled  -continue HSS  -monitor FS while on steroids    Abnormal TSH:  -TSH level noted 0.26,   -TSH 0.37 and   - Free T4 1.82      ================= Skin/Catheters============================  No rashes. Peripheral IV lines.   central line, a-line, ng tube present 3/29, Will change today 4/6    - =================Prophylaxis =============================  Lovenox for DVT proph  Protonix for  GI proph    ==================GOC==================================   FULL CODE    Spoke with patient/s wife Rosa and grand daughter, she wants her son to be the POC   ASSESSMENT AND PLAN:  55M, no PMH, PSH appy and moody 1990s presented 3/14 with x9 days worsening cough, subjective fevers, and SOB, with x2-3 days dysuria and central, non-radiating, constant CP. Admitted to [facility] for acute hypoxic respiratory failure s/t covid pneumonia, ICU consulted for increasing work of breathing on 15 lpm NRM.     1. Acute hypoxic respiratory failure  2. ARDS 2/2 Covid pneumonia  3. Transaminitis  4. Prediabetes  5. Abnormal TSH    =================== Neuro============================  Alert and oriented x 3 at baseline     intubated and sedated 3/29   Propofol and fentanyl, Restarted on Versed for vent synchrony   Add Ativan 2mg PO to help taper off Versed       ================= Cardiovascular==========================  Hypertension  not on any pressor support, maintaining map above 60\  s/p Labetalol 20mg IVP for high BP  s/p Lopressor 5mg IVP x 1 4/6      TACHYCARDIA:  pT HR in 80s   will keep monitoring         ================- Pulm=================================  Acute hypoxic respiratory failure: secondary to covid pneumonia  -was on  HFNr then got intubated 3/29  -D-dimer initially elevated on presentation, now wnl  -Remdesivir was discontinued due to positive antibodies   - add albuterol prn   -procalcitonin wnl, d-dimer in 300s  -started pulse steroids for 3 days - 250mg solumedrol bid- completed3/27  - solumedrol 40 once daily, Will taper now   - Finished Dexamethasone     #Pneumothorax and pneumomediastinum:  Resolved   X-ray chest: tiny left apical pneumothorax  Thoracic surgery consulted recommended no intervention at this time, just observation for now  X-ray monitoring daily    ==================ID===================================  Covid pneumonia  -leukocytosis resolved  -plan as above     ================= Nephro================================  -No issues   -monitor lytes, SCr   -monitor I/Os,   -Net positive , Urine output monitoring  -Start Lasix 40mg Daily and HCTZ 50mg     =================GI====================================  Transaminitis:   likely secondary to Covid   - and  on presentation  -Improved  -continue to monitor,  -  hepatitis panel -ve    diet:   ng tube and tube feed  bowel regimen,   Was having loose stools, On Bowel regimen     ================ Heme==================================  Elevated d-dimer: likely secondary to Covid  -d-dimer 423 on presentation, now wnl  -continue prophylactic Lovenox 40 mg bid    =================Endocrine===============================  Prediabetes:    -A1c  5.8  -BS controlled  -continue HSS  -monitor FS while on steroids    Abnormal TSH:  -TSH level noted 0.26,   -TSH 0.37 and   - Free T4 1.82      ================= Skin/Catheters============================  No rashes. Peripheral IV lines.   LIj placed 4/6    - =================Prophylaxis =============================  Lovenox for DVT proph  Protonix for  GI proph    ==================GOC==================================   FULL CODE    Spoke with patient/s wife Rosa and grand daughter, she wants her son to be the POC

## 2021-04-07 NOTE — PROGRESS NOTE ADULT - SUBJECTIVE AND OBJECTIVE BOX
Patient is a 55y old  Male who presents with a chief complaint of SOB (07 Apr 2021 07:53)  Patient is sedated, intubated, laying in bed in NAD. FIO2 45% with oxygen sat 94%. Had low grade temp earlier.    INTERVAL HPI/OVERNIGHT EVENTS:      VITAL SIGNS:  T(F): 97.3 (04-07-21 @ 05:27)  HR: 107 (04-07-21 @ 11:00)  BP: --  RR: 26 (04-07-21 @ 11:00)  SpO2: 92% (04-07-21 @ 11:00)  Wt(kg): --  I&O's Detail    06 Apr 2021 07:01  -  07 Apr 2021 07:00  --------------------------------------------------------  IN:    FentaNYL: 798 mL    FentaNYL: 210 mL    Glucerna 1.5: 200 mL    IV PiggyBack: 100 mL    IV PiggyBack: 499.8 mL    Midazolam: 266.5 mL    Propofol: 254.9 mL  Total IN: 2329.2 mL    OUT:    Indwelling Catheter - Urethral (mL): 2375 mL    Nasogastric/Oral tube (mL): 250 mL  Total OUT: 2625 mL    Total NET: -295.8 mL      07 Apr 2021 07:01  -  07 Apr 2021 11:27  --------------------------------------------------------  IN:    FentaNYL: 84 mL    Midazolam: 25.2 mL    Propofol: 15.2 mL  Total IN: 124.4 mL    OUT:    Indwelling Catheter - Urethral (mL): 250 mL  Total OUT: 250 mL    Total NET: -125.6 mL        Mode: AC/ CMV (Assist Control/ Continuous Mandatory Ventilation)  RR (machine): 26  TV (machine): 400  FiO2: 60  PEEP: 10  ITime: 0.8  MAP: 17  PIP: 38        REVIEW OF SYSTEMS:    CONSTITUTIONAL:  No fevers, chills, sweats    HEENT:  Eyes:  No diplopia or blurred vision. ENT:  No earache, sore throat or runny nose.    CARDIOVASCULAR:  No pressure, squeezing, tightness, or heaviness about the chest; no palpitations.    RESPIRATORY:  Per HPI    GASTROINTESTINAL:  No abdominal pain, nausea, vomiting or diarrhea.    GENITOURINARY:  No dysuria, frequency or urgency.    NEUROLOGIC:  No paresthesias, fasciculations, seizures or weakness.    PSYCHIATRIC:  No disorder of thought or mood.      PHYSICAL EXAM:    Constitutional: Well developed and nourished  Eyes:Perrla  ENMT: normal  Neck:supple  Respiratory: good air entry  Cardiovascular: S1 S2 regular  Gastrointestinal: Soft, Non tender  Extremities: No edema  Vascular:normal  Neurological:Awake, alert,Ox3  Musculoskeletal:Normal      MEDICATIONS  (STANDING):  ALBUTerol    90 MICROgram(s) HFA Inhaler 2 Puff(s) Inhalation every 6 hours  ascorbic acid 1000 milliGRAM(s) Oral daily  aspirin  chewable 81 milliGRAM(s) Oral daily  bisacodyl Suppository 10 milliGRAM(s) Rectal daily  chlorhexidine 0.12% Liquid 15 milliLiter(s) Oral Mucosa two times a day  chlorhexidine 2% Cloths 1 Application(s) Topical <User Schedule>  enoxaparin Injectable 40 milliGRAM(s) SubCutaneous two times a day  fentaNYL   Infusion. 5 MICROgram(s)/kG/Hr (41.9 mL/Hr) IV Continuous <Continuous>  hydrochlorothiazide 50 milliGRAM(s) Oral daily  insulin lispro (ADMELOG) corrective regimen sliding scale   SubCutaneous every 6 hours  LORazepam     Tablet 2 milliGRAM(s) Oral two times a day  midazolam Infusion 0.02 mG/kG/Hr (1.68 mL/Hr) IV Continuous <Continuous>  mineral oil enema 133 milliLiter(s) Rectal daily  pantoprazole   Suspension 40 milliGRAM(s) Oral daily  polyethylene glycol 3350 17 Gram(s) Oral daily  predniSONE   Tablet 30 milliGRAM(s) Oral daily  propofol Infusion 30.007 MICROgram(s)/kG/Min (15.1 mL/Hr) IV Continuous <Continuous>  senna 2 Tablet(s) Oral at bedtime    MEDICATIONS  (PRN):  acetaminophen    Suspension .. 650 milliGRAM(s) Oral every 6 hours PRN Temp greater or equal to 38C (100.4F)  sodium chloride 0.9% lock flush 10 milliLiter(s) IV Push every 1 hour PRN Pre/post blood products, medications, blood draw, and to maintain line patency      Allergies    No Known Allergies    Intolerances        LABS:                        11.4   12.60 )-----------( 136      ( 07 Apr 2021 03:53 )             37.9     04-07    146<H>  |  106  |  25<H>  ----------------------------<  80  4.6   |  38<H>  |  0.36<L>    Ca    7.9<L>      07 Apr 2021 03:53  Phos  5.8     04-07  Mg     2.1     04-07    TPro  5.3<L>  /  Alb  1.5<L>  /  TBili  0.6  /  DBili  x   /  AST  39  /  ALT  63<H>  /  AlkPhos  106  04-07    PT/INR - ( 06 Apr 2021 05:03 )   PT: 13.2 sec;   INR: 1.12 ratio         PTT - ( 06 Apr 2021 05:03 )  PTT:29.9 sec    ABG - ( 07 Apr 2021 04:52 )  pH, Arterial: 7.22  pH, Blood: x     /  pCO2: 92    /  pO2: 132   / HCO3: 38    / Base Excess: 6.5   /  SaO2: 100                   CAPILLARY BLOOD GLUCOSE      POCT Blood Glucose.: 163 mg/dL (07 Apr 2021 06:42)  POCT Blood Glucose.: 55 mg/dL (07 Apr 2021 05:28)  POCT Blood Glucose.: 96 mg/dL (06 Apr 2021 23:22)  POCT Blood Glucose.: 106 mg/dL (06 Apr 2021 17:54)  POCT Blood Glucose.: 116 mg/dL (06 Apr 2021 11:55)    pro-bnp -- 04-06 @ 05:03     d-dimer 363  04-06 @ 05:03  pro-bnp -- 04-03 @ 03:48     d-dimer 1250  04-03 @ 03:48      RADIOLOGY & ADDITIONAL TESTS:    CXR:  < from: Xray Chest 1 View- PORTABLE-Urgent (Xray Chest 1 View- PORTABLE-Urgent .) (04.06.21 @ 17:18) >  IMPRESSION:  ET tube, feeding tube, right central line again noted. No pneumothorax.    Bilateral airspace opacities left greater than right without significant change    Heart size cannot be accurately assessed in this projection.    < end of copied text >    Ct scan chest:    ekg;    echo:

## 2021-04-07 NOTE — PROGRESS NOTE ADULT - SUBJECTIVE AND OBJECTIVE BOX
Patient is a 55y old  Male who presents with a chief complaint of SOB (06 Apr 2021 12:33)    pt seen in icu [ x ], reg med floor [   ], bed [ x ], chair at bedside [   ], a+o x3 [  ], sedated [x  ],  nad [x  ]    andrea [ x ], ngt [x  ], et tube [ x ], cent line [x  ],        Allergies    No Known Allergies        Vitals    T(F): 97.3 (04-07-21 @ 05:27), Max: 100.4 (04-07-21 @ 00:00)  HR: 96 (04-07-21 @ 06:00) (84 - 129)  BP: --  RR: 26 (04-07-21 @ 06:00) (13 - 27)  SpO2: 94% (04-07-21 @ 06:00) (84% - 98%)  Wt(kg): --  CAPILLARY BLOOD GLUCOSE      POCT Blood Glucose.: 163 mg/dL (07 Apr 2021 06:42)      Labs                          11.4   12.60 )-----------( 136      ( 07 Apr 2021 03:53 )             37.9       04-07    146<H>  |  106  |  25<H>  ----------------------------<  80  4.6   |  38<H>  |  0.36<L>    Ca    7.9<L>      07 Apr 2021 03:53  Phos  5.8     04-07  Mg     2.1     04-07    TPro  5.3<L>  /  Alb  1.5<L>  /  TBili  0.6  /  DBili  x   /  AST  39  /  ALT  63<H>  /  AlkPhos  106  04-07            .Urine Clean Catch (Midstream)  03-15 @ 00:52   No growth  --  --          Radiology Results      Meds    MEDICATIONS  (STANDING):  ALBUTerol    90 MICROgram(s) HFA Inhaler 2 Puff(s) Inhalation every 6 hours  ascorbic acid 1000 milliGRAM(s) Oral daily  aspirin  chewable 81 milliGRAM(s) Oral daily  bisacodyl Suppository 10 milliGRAM(s) Rectal daily  chlorhexidine 0.12% Liquid 15 milliLiter(s) Oral Mucosa two times a day  chlorhexidine 2% Cloths 1 Application(s) Topical <User Schedule>  enoxaparin Injectable 40 milliGRAM(s) SubCutaneous two times a day  fentaNYL   Infusion. 5 MICROgram(s)/kG/Hr (41.9 mL/Hr) IV Continuous <Continuous>  hydrochlorothiazide 25 milliGRAM(s) Oral daily  insulin lispro (ADMELOG) corrective regimen sliding scale   SubCutaneous every 6 hours  LORazepam     Tablet 2 milliGRAM(s) Oral two times a day  methylPREDNISolone sodium succinate Injectable 40 milliGRAM(s) IV Push daily  midazolam Infusion 0.02 mG/kG/Hr (1.68 mL/Hr) IV Continuous <Continuous>  pantoprazole   Suspension 40 milliGRAM(s) Oral daily  polyethylene glycol 3350 17 Gram(s) Oral daily  propofol Infusion 30.007 MICROgram(s)/kG/Min (15.1 mL/Hr) IV Continuous <Continuous>  senna 2 Tablet(s) Oral at bedtime      MEDICATIONS  (PRN):  acetaminophen    Suspension .. 650 milliGRAM(s) Oral every 6 hours PRN Temp greater or equal to 38C (100.4F)  sodium chloride 0.9% lock flush 10 milliLiter(s) IV Push every 1 hour PRN Pre/post blood products, medications, blood draw, and to maintain line patency      Physical Exam    Neuro :  no focal deficits  Respiratory: CTA B/L  CV: RRR, S1S2, no murmurs,   Abdominal: Soft, NT, ND +BS,  Extremities: No edema, + peripheral pulses      ASSESSMENT    Hypoxemia 2nd to covid pna   transaminitis  prediabetes  h/o appendectomy  cholecystectomy        PLAN    contact and airborne isolation  d/c remdesevir given covid ab positive noted   completed dexamethasone   started pulse steroids for 3 days - 250mg solumedrol bid now tapered to 40mg daily  cont asa, vit c,    cont albuterol inhaler   pulm f/u  procalcitonin, D-dimer, crp, ldh, ferritin, lactate noted ,    tmx 99.6  cont tylenol prn,   cont robitussin prn   pt on fentanyl, and propofol drip  s/p intubation 3/29/21  O2 sat (90% - 96%) mech vent  O2 via mech vent and taper fio2 as tolerated   xray 3/19/21 with pneumomediastinum  rept cxr with New trace right apical pneumothorax. New mild left apical pneumothorax. Grossly stable small pneumomediastinum.  Soft tissue emphysema at the neck bases bilaterally. Grossly stable bilateral pulmonary infiltrates noted.   cxr 2/24 with No evidence of pneumothorax can be appreciated on the available image. This may be related to patient positioning. Evidence of pneumomediastinum and subcutaneous emphysema in the lower neck is again noted. There are patchy bibasilar infiltrates and elevated right hemidiaphragm noted.   cxr 3/29/21 with No significant change bilateral infiltrates. There is a small simple left apical pneumothorax. No significant pleural effusion. Bilateral subcutaneous emphysema similar to prior.   thoracic surg f/u   pt intubated 6AM 3/29/21, XRs on 7AM and 5PM with no obvious increase of ptx  no immediate need for CT at this time,   recommend close monitoring with serial CXRs   cxr 4/4/21 with Improving bilateral airspace disease noted   lispro ss   prognosis poor  cont current meds  cont mgmt as per icu         Patient is a 55y old  Male who presents with a chief complaint of SOB (06 Apr 2021 12:33)    pt seen in icu [ x ], reg med floor [   ], bed [ x ], chair at bedside [   ], a+o x3 [  ], sedated [x  ],  nad [x  ]    andrea [ x ], ngt [x  ], et tube [ x ], cent line [x  ],        Allergies    No Known Allergies        Vitals    T(F): 97.3 (04-07-21 @ 05:27), Max: 100.4 (04-07-21 @ 00:00)  HR: 96 (04-07-21 @ 06:00) (84 - 129)  BP: --  RR: 26 (04-07-21 @ 06:00) (13 - 27)  SpO2: 94% (04-07-21 @ 06:00) (84% - 98%)  Wt(kg): --  CAPILLARY BLOOD GLUCOSE      POCT Blood Glucose.: 163 mg/dL (07 Apr 2021 06:42)      Labs                          11.4   12.60 )-----------( 136      ( 07 Apr 2021 03:53 )             37.9       04-07    146<H>  |  106  |  25<H>  ----------------------------<  80  4.6   |  38<H>  |  0.36<L>    Ca    7.9<L>      07 Apr 2021 03:53  Phos  5.8     04-07  Mg     2.1     04-07    TPro  5.3<L>  /  Alb  1.5<L>  /  TBili  0.6  /  DBili  x   /  AST  39  /  ALT  63<H>  /  AlkPhos  106  04-07            .Urine Clean Catch (Midstream)  03-15 @ 00:52   No growth  --  --    Blood Gas Profile - Arterial (04.07.21 @ 04:52)   pH, Arterial: 7.22   pCO2, Arterial: 92: TYPE:(C=Critical, N=Notification, A=Abnormal) C   pO2, Arterial: 132 mmHg   HCO3, Arterial: 38 mmol/L   Base Excess, Arterial: 6.5 mmol/L   Oxygen Saturation, Arterial: 100 %   FIO2, Arterial: 80         Radiology Results      Meds    MEDICATIONS  (STANDING):  ALBUTerol    90 MICROgram(s) HFA Inhaler 2 Puff(s) Inhalation every 6 hours  ascorbic acid 1000 milliGRAM(s) Oral daily  aspirin  chewable 81 milliGRAM(s) Oral daily  bisacodyl Suppository 10 milliGRAM(s) Rectal daily  chlorhexidine 0.12% Liquid 15 milliLiter(s) Oral Mucosa two times a day  chlorhexidine 2% Cloths 1 Application(s) Topical <User Schedule>  enoxaparin Injectable 40 milliGRAM(s) SubCutaneous two times a day  fentaNYL   Infusion. 5 MICROgram(s)/kG/Hr (41.9 mL/Hr) IV Continuous <Continuous>  hydrochlorothiazide 25 milliGRAM(s) Oral daily  insulin lispro (ADMELOG) corrective regimen sliding scale   SubCutaneous every 6 hours  LORazepam     Tablet 2 milliGRAM(s) Oral two times a day  methylPREDNISolone sodium succinate Injectable 40 milliGRAM(s) IV Push daily  midazolam Infusion 0.02 mG/kG/Hr (1.68 mL/Hr) IV Continuous <Continuous>  pantoprazole   Suspension 40 milliGRAM(s) Oral daily  polyethylene glycol 3350 17 Gram(s) Oral daily  propofol Infusion 30.007 MICROgram(s)/kG/Min (15.1 mL/Hr) IV Continuous <Continuous>  senna 2 Tablet(s) Oral at bedtime      MEDICATIONS  (PRN):  acetaminophen    Suspension .. 650 milliGRAM(s) Oral every 6 hours PRN Temp greater or equal to 38C (100.4F)  sodium chloride 0.9% lock flush 10 milliLiter(s) IV Push every 1 hour PRN Pre/post blood products, medications, blood draw, and to maintain line patency      Physical Exam    Neuro :  no focal deficits  Respiratory: CTA B/L  CV: RRR, S1S2, no murmurs,   Abdominal: Soft, NT, ND +BS,  Extremities: No edema, + peripheral pulses      ASSESSMENT    Hypoxemia 2nd to covid pna   transaminitis  prediabetes  h/o appendectomy  cholecystectomy        PLAN    contact and airborne isolation  d/c remdesevir given covid ab positive noted   completed dexamethasone   started pulse steroids for 3 days - 250mg solumedrol bid now tapered to 40mg daily  cont asa, vit c,    cont albuterol inhaler   pulm f/u  procalcitonin, D-dimer, crp, ldh, ferritin, lactate noted ,    tmx 99.6  cont tylenol prn,   cont robitussin prn   pt on fentanyl, midazolam and propofol drip  s/p intubation 3/29/21  O2 sat (84% - 98%) mech vent  O2 via mech vent and taper fio2 as tolerated   abg noted above  vent mgmt as per icu  xray 3/19/21 with pneumomediastinum  rept cxr with New trace right apical pneumothorax. New mild left apical pneumothorax. Grossly stable small pneumomediastinum.  Soft tissue emphysema at the neck bases bilaterally. Grossly stable bilateral pulmonary infiltrates noted.   cxr 2/24 with No evidence of pneumothorax can be appreciated on the available image. This may be related to patient positioning. Evidence of pneumomediastinum and subcutaneous emphysema in the lower neck is again noted. There are patchy bibasilar infiltrates and elevated right hemidiaphragm noted.   cxr 3/29/21 with No significant change bilateral infiltrates. There is a small simple left apical pneumothorax. No significant pleural effusion. Bilateral subcutaneous emphysema similar to prior.   thoracic surg f/u   pt intubated 6AM 3/29/21, XRs on 7AM and 5PM with no obvious increase of ptx  no immediate need for CT at this time,   recommend close monitoring with serial CXRs   cxr 4/4/21 with Improving bilateral airspace disease noted   lispro ss   prognosis poor  cont current meds  cont mgmt as per icu

## 2021-04-07 NOTE — PROGRESS NOTE ADULT - SUBJECTIVE AND OBJECTIVE BOX
INTERVAL HPI/OVERNIGHT EVENTS: ***    PRESSORS: [ ] YES [ ] NO  WHICH:    ANTIBIOTICS:                  DATE STARTED:  ANTIBIOTICS:                  DATE STARTED:  ANTIBIOTICS:                  DATE STARTED:    Antimicrobial:    Cardiovascular:  hydrochlorothiazide 25 milliGRAM(s) Oral daily    Pulmonary:  ALBUTerol    90 MICROgram(s) HFA Inhaler 2 Puff(s) Inhalation every 6 hours    Hematalogic:  aspirin  chewable 81 milliGRAM(s) Oral daily  enoxaparin Injectable 40 milliGRAM(s) SubCutaneous two times a day    Other:  acetaminophen    Suspension .. 650 milliGRAM(s) Oral every 6 hours PRN  ascorbic acid 1000 milliGRAM(s) Oral daily  bisacodyl Suppository 10 milliGRAM(s) Rectal daily  chlorhexidine 0.12% Liquid 15 milliLiter(s) Oral Mucosa two times a day  chlorhexidine 2% Cloths 1 Application(s) Topical <User Schedule>  fentaNYL   Infusion. 5 MICROgram(s)/kG/Hr IV Continuous <Continuous>  insulin lispro (ADMELOG) corrective regimen sliding scale   SubCutaneous every 6 hours  LORazepam     Tablet 2 milliGRAM(s) Oral two times a day  methylPREDNISolone sodium succinate Injectable 40 milliGRAM(s) IV Push daily  midazolam Infusion 0.02 mG/kG/Hr IV Continuous <Continuous>  pantoprazole   Suspension 40 milliGRAM(s) Oral daily  polyethylene glycol 3350 17 Gram(s) Oral daily  propofol Infusion 30.007 MICROgram(s)/kG/Min IV Continuous <Continuous>  senna 2 Tablet(s) Oral at bedtime  sodium chloride 0.9% lock flush 10 milliLiter(s) IV Push every 1 hour PRN    acetaminophen    Suspension .. 650 milliGRAM(s) Oral every 6 hours PRN  ALBUTerol    90 MICROgram(s) HFA Inhaler 2 Puff(s) Inhalation every 6 hours  ascorbic acid 1000 milliGRAM(s) Oral daily  aspirin  chewable 81 milliGRAM(s) Oral daily  bisacodyl Suppository 10 milliGRAM(s) Rectal daily  chlorhexidine 0.12% Liquid 15 milliLiter(s) Oral Mucosa two times a day  chlorhexidine 2% Cloths 1 Application(s) Topical <User Schedule>  enoxaparin Injectable 40 milliGRAM(s) SubCutaneous two times a day  fentaNYL   Infusion. 5 MICROgram(s)/kG/Hr IV Continuous <Continuous>  hydrochlorothiazide 25 milliGRAM(s) Oral daily  insulin lispro (ADMELOG) corrective regimen sliding scale   SubCutaneous every 6 hours  LORazepam     Tablet 2 milliGRAM(s) Oral two times a day  methylPREDNISolone sodium succinate Injectable 40 milliGRAM(s) IV Push daily  midazolam Infusion 0.02 mG/kG/Hr IV Continuous <Continuous>  pantoprazole   Suspension 40 milliGRAM(s) Oral daily  polyethylene glycol 3350 17 Gram(s) Oral daily  propofol Infusion 30.007 MICROgram(s)/kG/Min IV Continuous <Continuous>  senna 2 Tablet(s) Oral at bedtime  sodium chloride 0.9% lock flush 10 milliLiter(s) IV Push every 1 hour PRN    Drug Dosing Weight  Height (cm): 167.6 (14 Mar 2021 12:03)  Weight (kg): 83.869 (14 Mar 2021 12:03)  BMI (kg/m2): 29.9 (14 Mar 2021 12:03)  BSA (m2): 1.93 (14 Mar 2021 12:03)    CENTRAL LINE: [ ] YES [ ] NO  LOCATION:   DATE INSERTED:  REMOVE: [ ] YES [ ] NO  EXPLAIN:    RIVERA: [ ] YES [ ] NO    DATE INSERTED:  REMOVE:  [ ] YES [ ] NO  EXPLAIN:    A-LINE:  [ ] YES [ ] NO  LOCATION:   DATE INSERTED:  REMOVE:  [ ] YES [ ] NO  EXPLAIN:    PMH -reviewed admission note, no change since admission  PAST MEDICAL & SURGICAL HISTORY:  No pertinent past medical history    S/P moody  1990s    S/P appendectomy  1990s        ICU Vital Signs Last 24 Hrs  T(C): 36.3 (07 Apr 2021 05:27), Max: 38 (07 Apr 2021 00:00)  T(F): 97.3 (07 Apr 2021 05:27), Max: 100.4 (07 Apr 2021 00:00)  HR: 91 (07 Apr 2021 07:30) (84 - 129)  BP: --  BP(mean): --  ABP: 89/51 (07 Apr 2021 07:30) (89/51 - 240/128)  ABP(mean): 64 (07 Apr 2021 07:30) (64 - 167)  RR: 26 (07 Apr 2021 07:30) (13 - 26)  SpO2: 97% (07 Apr 2021 07:30) (84% - 98%)      ABG - ( 07 Apr 2021 04:52 )  pH, Arterial: 7.22  pH, Blood: x     /  pCO2: 92    /  pO2: 132   / HCO3: 38    / Base Excess: 6.5   /  SaO2: 100                   04-06 @ 07:01  -  04-07 @ 07:00  --------------------------------------------------------  IN: 2264.5 mL / OUT: 2625 mL / NET: -360.5 mL        Mode: AC/ CMV (Assist Control/ Continuous Mandatory Ventilation)  RR (machine): 26  TV (machine): 400  FiO2: 60  PEEP: 10  ITime: 0.8  MAP: 17  PIP: 38      PHYSICAL EXAM:    GENERAL: [ ]NAD, [ ]well-groomed, [ ]well-developed  HEAD:  [ ]Atraumatic, [ ]Normocephalic  EYES: [ ]EOMI, [ ]PERRLA, [ ]conjunctiva and sclera clear  ENMT: [ ]No tonsillar erythema, exudates, or enlargement; [ ]Moist mucous membranes, [ ]Good dentition, [ ]No lesions  NECK: [ ]Supple, normal appearance, [ ]No JVD; [ ]Normal thyroid; [ ]Trachea midline  NERVOUS SYSTEM:  [ ]Alert & Oriented X3, [ ]Good concentration; [ ]Motor Strength 5/5 B/L upper and lower extremities; [ ]DTRs 2+ intact and symmetric  CHEST/LUNG: [ ]No chest deformity; [ ]Normal percussion bilaterally; [ ]No rales, rhonchi, wheezing   HEART: [ ]Regular rate and rhythm; [ ]No murmurs, rubs, or gallops  ABDOMEN: [ ]Soft, Nontender, Nondistended; [ ]Bowel sounds present  EXTREMITIES:  [ ]2+ Peripheral Pulses, [ ]No clubbing, cyanosis, or edema  LYMPH: [ ]No lymphadenopathy noted  SKIN: [ ]No rashes or lesions; [ ]Good capillary refill      LABS:  CBC Full  -  ( 07 Apr 2021 03:53 )  WBC Count : 12.60 K/uL  RBC Count : 3.64 M/uL  Hemoglobin : 11.4 g/dL  Hematocrit : 37.9 %  Platelet Count - Automated : 136 K/uL  Mean Cell Volume : 104.1 fl  Mean Cell Hemoglobin : 31.3 pg  Mean Cell Hemoglobin Concentration : 30.1 gm/dL  Auto Neutrophil # : 10.73 K/uL  Auto Lymphocyte # : 0.85 K/uL  Auto Monocyte # : 0.31 K/uL  Auto Eosinophil # : 0.04 K/uL  Auto Basophil # : 0.04 K/uL  Auto Neutrophil % : 85.2 %  Auto Lymphocyte % : 6.7 %  Auto Monocyte % : 2.5 %  Auto Eosinophil % : 0.3 %  Auto Basophil % : 0.3 %    04-07    146<H>  |  106  |  25<H>  ----------------------------<  80  4.6   |  38<H>  |  0.36<L>    Ca    7.9<L>      07 Apr 2021 03:53  Phos  5.8     04-07  Mg     2.1     04-07    TPro  5.3<L>  /  Alb  1.5<L>  /  TBili  0.6  /  DBili  x   /  AST  39  /  ALT  63<H>  /  AlkPhos  106  04-07    PT/INR - ( 06 Apr 2021 05:03 )   PT: 13.2 sec;   INR: 1.12 ratio         PTT - ( 06 Apr 2021 05:03 )  PTT:29.9 sec        RADIOLOGY & ADDITIONAL STUDIES REVIEWED:  ***    [ ]GOALS OF CARE DISCUSSION WITH PATIENT/FAMILY/PROXY:    CRITICAL CARE TIME SPENT: 35 minutes INTERVAL HPI/OVERNIGHT EVENTS: Hypoglycemic to 55, received D50 push. TF resumed     PRESSORS: [ ] YES [x ] NO  WHICH:    ANTIBIOTICS:                  DATE STARTED:  ANTIBIOTICS:                  DATE STARTED:  ANTIBIOTICS:                  DATE STARTED:    Antimicrobial:    Cardiovascular:  hydrochlorothiazide 25 milliGRAM(s) Oral daily    Pulmonary:  ALBUTerol    90 MICROgram(s) HFA Inhaler 2 Puff(s) Inhalation every 6 hours    Hematalogic:  aspirin  chewable 81 milliGRAM(s) Oral daily  enoxaparin Injectable 40 milliGRAM(s) SubCutaneous two times a day    Other:  acetaminophen    Suspension .. 650 milliGRAM(s) Oral every 6 hours PRN  ascorbic acid 1000 milliGRAM(s) Oral daily  bisacodyl Suppository 10 milliGRAM(s) Rectal daily  chlorhexidine 0.12% Liquid 15 milliLiter(s) Oral Mucosa two times a day  chlorhexidine 2% Cloths 1 Application(s) Topical <User Schedule>  fentaNYL   Infusion. 5 MICROgram(s)/kG/Hr IV Continuous <Continuous>  insulin lispro (ADMELOG) corrective regimen sliding scale   SubCutaneous every 6 hours  LORazepam     Tablet 2 milliGRAM(s) Oral two times a day  methylPREDNISolone sodium succinate Injectable 40 milliGRAM(s) IV Push daily  midazolam Infusion 0.02 mG/kG/Hr IV Continuous <Continuous>  pantoprazole   Suspension 40 milliGRAM(s) Oral daily  polyethylene glycol 3350 17 Gram(s) Oral daily  propofol Infusion 30.007 MICROgram(s)/kG/Min IV Continuous <Continuous>  senna 2 Tablet(s) Oral at bedtime  sodium chloride 0.9% lock flush 10 milliLiter(s) IV Push every 1 hour PRN    acetaminophen    Suspension .. 650 milliGRAM(s) Oral every 6 hours PRN  ALBUTerol    90 MICROgram(s) HFA Inhaler 2 Puff(s) Inhalation every 6 hours  ascorbic acid 1000 milliGRAM(s) Oral daily  aspirin  chewable 81 milliGRAM(s) Oral daily  bisacodyl Suppository 10 milliGRAM(s) Rectal daily  chlorhexidine 0.12% Liquid 15 milliLiter(s) Oral Mucosa two times a day  chlorhexidine 2% Cloths 1 Application(s) Topical <User Schedule>  enoxaparin Injectable 40 milliGRAM(s) SubCutaneous two times a day  fentaNYL   Infusion. 5 MICROgram(s)/kG/Hr IV Continuous <Continuous>  hydrochlorothiazide 25 milliGRAM(s) Oral daily  insulin lispro (ADMELOG) corrective regimen sliding scale   SubCutaneous every 6 hours  LORazepam     Tablet 2 milliGRAM(s) Oral two times a day  methylPREDNISolone sodium succinate Injectable 40 milliGRAM(s) IV Push daily  midazolam Infusion 0.02 mG/kG/Hr IV Continuous <Continuous>  pantoprazole   Suspension 40 milliGRAM(s) Oral daily  polyethylene glycol 3350 17 Gram(s) Oral daily  propofol Infusion 30.007 MICROgram(s)/kG/Min IV Continuous <Continuous>  senna 2 Tablet(s) Oral at bedtime  sodium chloride 0.9% lock flush 10 milliLiter(s) IV Push every 1 hour PRN    Drug Dosing Weight  Height (cm): 167.6 (14 Mar 2021 12:03)  Weight (kg): 83.869 (14 Mar 2021 12:03)  BMI (kg/m2): 29.9 (14 Mar 2021 12:03)  BSA (m2): 1.93 (14 Mar 2021 12:03)    CENTRAL LINE: [x ] YES [ ] NO  LOCATION: [facility]   DATE INSERTED: 4/6  REMOVE: [ ] YES [ ] NO  EXPLAIN:    RIVERA: [x ] YES [ ] NO    DATE INSERTED:  REMOVE:  [ ] YES [ ] NO  EXPLAIN:    A-LINE:  [x ] YES [ ] NO  LOCATION: [facility]  DATE INSERTED: 3/29  REMOVE:  [ ] YES [ ] NO  EXPLAIN:    PMH -reviewed admission note, no change since admission  PAST MEDICAL & SURGICAL HISTORY:  No pertinent past medical history    S/P moody  1990s    S/P appendectomy  1990s        ICU Vital Signs Last 24 Hrs  T(C): 36.3 (07 Apr 2021 05:27), Max: 38 (07 Apr 2021 00:00)  T(F): 97.3 (07 Apr 2021 05:27), Max: 100.4 (07 Apr 2021 00:00)  HR: 91 (07 Apr 2021 07:30) (84 - 129)  ABP: 89/51 (07 Apr 2021 07:30) (89/51 - 240/128)  ABP(mean): 64 (07 Apr 2021 07:30) (64 - 167)  RR: 26 (07 Apr 2021 07:30) (13 - 26)  SpO2: 97% (07 Apr 2021 07:30) (84% - 98%)      ABG - ( 07 Apr 2021 04:52 )  pH, Arterial: 7.22  pH, Blood: x     /  pCO2: 92    /  pO2: 132   / HCO3: 38    / Base Excess: 6.5   /  SaO2: 100                   04-06 @ 07:01  -  04-07 @ 07:00  --------------------------------------------------------  IN: 2264.5 mL / OUT: 2625 mL / NET: -360.5 mL        Mode: AC/ CMV (Assist Control/ Continuous Mandatory Ventilation)  RR (machine): 26  TV (machine): 400  FiO2: 60  PEEP: 10  ITime: 0.8  MAP: 17  PIP: 38      PHYSICAL EXAM:    GENERAL: sedated and intubated  HEAD:  Atraumatic, Normocephalic  EYES: EOMI, PERRLA, conjunctiva and sclera clear  ENMT: No tonsillar erythema, exudates, or enlargement; blood at nostrils  NECK: Supple, normal appearance,   NERVOUS SYSTEM: sedated and intubated  CHEST/LUNG: Pt has breath sounds on both sides , Abdominal breath sounds   HEART: s1,s2  No murmurs, rubs, or gallops  ABDOMEN: Soft, Nontender, distended;  EXTREMITIES:  2+ Peripheral Pulses, No clubbing, getting more  edematous  LYMPH: No lymphadenopathy noted  SKIN: No rashes or lesions; Good capillary         LABS:  CBC Full  -  ( 07 Apr 2021 03:53 )  WBC Count : 12.60 K/uL  RBC Count : 3.64 M/uL  Hemoglobin : 11.4 g/dL  Hematocrit : 37.9 %  Platelet Count - Automated : 136 K/uL  Mean Cell Volume : 104.1 fl  Mean Cell Hemoglobin : 31.3 pg  Mean Cell Hemoglobin Concentration : 30.1 gm/dL  Auto Neutrophil # : 10.73 K/uL  Auto Lymphocyte # : 0.85 K/uL  Auto Monocyte # : 0.31 K/uL  Auto Eosinophil # : 0.04 K/uL  Auto Basophil # : 0.04 K/uL  Auto Neutrophil % : 85.2 %  Auto Lymphocyte % : 6.7 %  Auto Monocyte % : 2.5 %  Auto Eosinophil % : 0.3 %  Auto Basophil % : 0.3 %    04-07    146<H>  |  106  |  25<H>  ----------------------------<  80  4.6   |  38<H>  |  0.36<L>    Ca    7.9<L>      07 Apr 2021 03:53  Phos  5.8     04-07  Mg     2.1     04-07    TPro  5.3<L>  /  Alb  1.5<L>  /  TBili  0.6  /  DBili  x   /  AST  39  /  ALT  63<H>  /  AlkPhos  106  04-07    PT/INR - ( 06 Apr 2021 05:03 )   PT: 13.2 sec;   INR: 1.12 ratio         PTT - ( 06 Apr 2021 05:03 )  PTT:29.9 sec        RADIOLOGY & ADDITIONAL STUDIES REVIEWED:  Yes    [ ]GOALS OF CARE DISCUSSION WITH PATIENT/FAMILY/PROXY:    CRITICAL CARE TIME SPENT: 35 minutes

## 2021-04-07 NOTE — CHART NOTE - NSCHARTNOTEFT_GEN_A_CORE
Called patient's brother twice via [employer] ID [id number] w/o success. Voicemail left to call back

## 2021-04-08 DIAGNOSIS — A41.9 SEPSIS, UNSPECIFIED ORGANISM: ICD-10-CM

## 2021-04-08 LAB
ALBUMIN SERPL ELPH-MCNC: 1.3 G/DL — LOW (ref 3.5–5)
ALP SERPL-CCNC: 84 U/L — SIGNIFICANT CHANGE UP (ref 40–120)
ALT FLD-CCNC: 48 U/L DA — SIGNIFICANT CHANGE UP (ref 10–60)
ANION GAP SERPL CALC-SCNC: <0 MMOL/L — LOW (ref 5–17)
AST SERPL-CCNC: 30 U/L — SIGNIFICANT CHANGE UP (ref 10–40)
BASE EXCESS BLDA CALC-SCNC: 18.2 MMOL/L — HIGH (ref -2–3)
BASE EXCESS BLDA CALC-SCNC: 18.7 MMOL/L — HIGH (ref -2–3)
BASE EXCESS BLDA CALC-SCNC: 20.5 MMOL/L — HIGH (ref -2–3)
BASE EXCESS BLDA CALC-SCNC: 21.2 MMOL/L — HIGH (ref -2–3)
BASOPHILS # BLD AUTO: 0.02 K/UL — SIGNIFICANT CHANGE UP (ref 0–0.2)
BASOPHILS NFR BLD AUTO: 0.2 % — SIGNIFICANT CHANGE UP (ref 0–2)
BILIRUB SERPL-MCNC: 0.4 MG/DL — SIGNIFICANT CHANGE UP (ref 0.2–1.2)
BLOOD GAS COMMENTS ARTERIAL: SIGNIFICANT CHANGE UP
BUN SERPL-MCNC: 24 MG/DL — HIGH (ref 7–18)
CALCIUM SERPL-MCNC: 7.6 MG/DL — LOW (ref 8.4–10.5)
CHLORIDE SERPL-SCNC: 100 MMOL/L — SIGNIFICANT CHANGE UP (ref 96–108)
CO2 SERPL-SCNC: >45 MMOL/L — CRITICAL HIGH (ref 22–31)
CREAT SERPL-MCNC: 0.35 MG/DL — LOW (ref 0.5–1.3)
CRP SERPL-MCNC: 160 MG/L — HIGH
EOSINOPHIL # BLD AUTO: 0.01 K/UL — SIGNIFICANT CHANGE UP (ref 0–0.5)
EOSINOPHIL NFR BLD AUTO: 0.1 % — SIGNIFICANT CHANGE UP (ref 0–6)
FERRITIN SERPL-MCNC: 1124 NG/ML — HIGH (ref 30–400)
GLUCOSE BLDC GLUCOMTR-MCNC: 106 MG/DL — HIGH (ref 70–99)
GLUCOSE BLDC GLUCOMTR-MCNC: 118 MG/DL — HIGH (ref 70–99)
GLUCOSE BLDC GLUCOMTR-MCNC: 137 MG/DL — HIGH (ref 70–99)
GLUCOSE BLDC GLUCOMTR-MCNC: 140 MG/DL — HIGH (ref 70–99)
GLUCOSE BLDC GLUCOMTR-MCNC: 146 MG/DL — HIGH (ref 70–99)
GLUCOSE SERPL-MCNC: 140 MG/DL — HIGH (ref 70–99)
HCO3 BLDA-SCNC: 47 MMOL/L — CRITICAL HIGH (ref 21–28)
HCO3 BLDA-SCNC: 49 MMOL/L — CRITICAL HIGH (ref 21–28)
HCO3 BLDA-SCNC: 51 MMOL/L — CRITICAL HIGH (ref 21–28)
HCO3 BLDA-SCNC: 51 MMOL/L — CRITICAL HIGH (ref 21–28)
HCT VFR BLD CALC: 29 % — LOW (ref 39–50)
HGB BLD-MCNC: 9.1 G/DL — LOW (ref 13–17)
HOROWITZ INDEX BLDA+IHG-RTO: 60 — SIGNIFICANT CHANGE UP
HOROWITZ INDEX BLDA+IHG-RTO: 60 — SIGNIFICANT CHANGE UP
HOROWITZ INDEX BLDA+IHG-RTO: 70 — SIGNIFICANT CHANGE UP
HOROWITZ INDEX BLDA+IHG-RTO: 70 — SIGNIFICANT CHANGE UP
IMM GRANULOCYTES NFR BLD AUTO: 4.5 % — HIGH (ref 0–1.5)
LYMPHOCYTES # BLD AUTO: 0.56 K/UL — LOW (ref 1–3.3)
LYMPHOCYTES # BLD AUTO: 5.4 % — LOW (ref 13–44)
MAGNESIUM SERPL-MCNC: 2.1 MG/DL — SIGNIFICANT CHANGE UP (ref 1.6–2.6)
MCHC RBC-ENTMCNC: 31.4 GM/DL — LOW (ref 32–36)
MCHC RBC-ENTMCNC: 31.5 PG — SIGNIFICANT CHANGE UP (ref 27–34)
MCV RBC AUTO: 100.3 FL — HIGH (ref 80–100)
MONOCYTES # BLD AUTO: 0.15 K/UL — SIGNIFICANT CHANGE UP (ref 0–0.9)
MONOCYTES NFR BLD AUTO: 1.5 % — LOW (ref 2–14)
NEUTROPHILS # BLD AUTO: 9.09 K/UL — HIGH (ref 1.8–7.4)
NEUTROPHILS NFR BLD AUTO: 88.3 % — HIGH (ref 43–77)
NRBC # BLD: 0 /100 WBCS — SIGNIFICANT CHANGE UP (ref 0–0)
PCO2 BLDA: 71 MMHG — CRITICAL HIGH (ref 35–48)
PCO2 BLDA: 80 MMHG — CRITICAL HIGH (ref 35–48)
PCO2 BLDA: 88 MMHG — CRITICAL HIGH (ref 35–48)
PCO2 BLDA: 88 MMHG — CRITICAL HIGH (ref 35–48)
PH BLDA: 7.35 — SIGNIFICANT CHANGE UP (ref 7.35–7.45)
PH BLDA: 7.37 — SIGNIFICANT CHANGE UP (ref 7.35–7.45)
PH BLDA: 7.41 — SIGNIFICANT CHANGE UP (ref 7.35–7.45)
PH BLDA: 7.43 — SIGNIFICANT CHANGE UP (ref 7.35–7.45)
PHOSPHATE SERPL-MCNC: 1.9 MG/DL — LOW (ref 2.5–4.5)
PLATELET # BLD AUTO: 146 K/UL — LOW (ref 150–400)
PO2 BLDA: 75 MMHG — LOW (ref 83–108)
PO2 BLDA: 88 MMHG — SIGNIFICANT CHANGE UP (ref 83–108)
PO2 BLDA: 90 MMHG — SIGNIFICANT CHANGE UP (ref 83–108)
PO2 BLDA: 93 MMHG — SIGNIFICANT CHANGE UP (ref 83–108)
POTASSIUM SERPL-MCNC: 3.4 MMOL/L — LOW (ref 3.5–5.3)
POTASSIUM SERPL-SCNC: 3.4 MMOL/L — LOW (ref 3.5–5.3)
PROCALCITONIN SERPL-MCNC: 0.84 NG/ML — HIGH (ref 0.02–0.1)
PROT SERPL-MCNC: 4.5 G/DL — LOW (ref 6–8.3)
RBC # BLD: 2.89 M/UL — LOW (ref 4.2–5.8)
RBC # FLD: 12.6 % — SIGNIFICANT CHANGE UP (ref 10.3–14.5)
SAO2 % BLDA: 95 % — SIGNIFICANT CHANGE UP
SAO2 % BLDA: 97 % — SIGNIFICANT CHANGE UP
SAO2 % BLDA: 98 % — SIGNIFICANT CHANGE UP
SAO2 % BLDA: 99 % — SIGNIFICANT CHANGE UP
SODIUM SERPL-SCNC: 145 MMOL/L — SIGNIFICANT CHANGE UP (ref 135–145)
TRIGL SERPL-MCNC: 265 MG/DL — HIGH
WBC # BLD: 10.29 K/UL — SIGNIFICANT CHANGE UP (ref 3.8–10.5)
WBC # FLD AUTO: 10.29 K/UL — SIGNIFICANT CHANGE UP (ref 3.8–10.5)

## 2021-04-08 PROCEDURE — 99233 SBSQ HOSP IP/OBS HIGH 50: CPT

## 2021-04-08 PROCEDURE — 71045 X-RAY EXAM CHEST 1 VIEW: CPT | Mod: 26,77,76

## 2021-04-08 PROCEDURE — 71045 X-RAY EXAM CHEST 1 VIEW: CPT | Mod: 26

## 2021-04-08 RX ORDER — SODIUM CHLORIDE 9 MG/ML
500 INJECTION, SOLUTION INTRAVENOUS ONCE
Refills: 0 | Status: COMPLETED | OUTPATIENT
Start: 2021-04-08 | End: 2021-04-08

## 2021-04-08 RX ORDER — PHENYLEPHRINE HYDROCHLORIDE 10 MG/ML
0.2 INJECTION INTRAVENOUS
Qty: 160 | Refills: 0 | Status: DISCONTINUED | OUTPATIENT
Start: 2021-04-08 | End: 2021-04-08

## 2021-04-08 RX ORDER — PHENYLEPHRINE HYDROCHLORIDE 10 MG/ML
0.5 INJECTION INTRAVENOUS
Qty: 160 | Refills: 0 | Status: DISCONTINUED | OUTPATIENT
Start: 2021-04-08 | End: 2021-04-10

## 2021-04-08 RX ORDER — POTASSIUM PHOSPHATE, MONOBASIC POTASSIUM PHOSPHATE, DIBASIC 236; 224 MG/ML; MG/ML
30 INJECTION, SOLUTION INTRAVENOUS ONCE
Refills: 0 | Status: COMPLETED | OUTPATIENT
Start: 2021-04-08 | End: 2021-04-08

## 2021-04-08 RX ORDER — LACTULOSE 10 G/15ML
20 SOLUTION ORAL
Refills: 0 | Status: DISCONTINUED | OUTPATIENT
Start: 2021-04-08 | End: 2021-04-10

## 2021-04-08 RX ORDER — FUROSEMIDE 40 MG
40 TABLET ORAL DAILY
Refills: 0 | Status: DISCONTINUED | OUTPATIENT
Start: 2021-04-08 | End: 2021-04-10

## 2021-04-08 RX ADMIN — CHLORHEXIDINE GLUCONATE 15 MILLILITER(S): 213 SOLUTION TOPICAL at 17:11

## 2021-04-08 RX ADMIN — MIDAZOLAM HYDROCHLORIDE 1.68 MG/KG/HR: 1 INJECTION, SOLUTION INTRAMUSCULAR; INTRAVENOUS at 23:34

## 2021-04-08 RX ADMIN — Medication 10 MILLIGRAM(S): at 11:29

## 2021-04-08 RX ADMIN — ALBUTEROL 2 PUFF(S): 90 AEROSOL, METERED ORAL at 16:40

## 2021-04-08 RX ADMIN — ENOXAPARIN SODIUM 40 MILLIGRAM(S): 100 INJECTION SUBCUTANEOUS at 17:11

## 2021-04-08 RX ADMIN — PROPOFOL 15.1 MICROGRAM(S)/KG/MIN: 10 INJECTION, EMULSION INTRAVENOUS at 23:26

## 2021-04-08 RX ADMIN — Medication 50 MILLIGRAM(S): at 05:02

## 2021-04-08 RX ADMIN — Medication 2 MILLIGRAM(S): at 17:11

## 2021-04-08 RX ADMIN — CHLORHEXIDINE GLUCONATE 15 MILLILITER(S): 213 SOLUTION TOPICAL at 05:03

## 2021-04-08 RX ADMIN — CHLORHEXIDINE GLUCONATE 1 APPLICATION(S): 213 SOLUTION TOPICAL at 05:03

## 2021-04-08 RX ADMIN — FENTANYL CITRATE 41.9 MICROGRAM(S)/KG/HR: 50 INJECTION INTRAVENOUS at 05:09

## 2021-04-08 RX ADMIN — FENTANYL CITRATE 41.9 MICROGRAM(S)/KG/HR: 50 INJECTION INTRAVENOUS at 12:29

## 2021-04-08 RX ADMIN — MIDAZOLAM HYDROCHLORIDE 1.68 MG/KG/HR: 1 INJECTION, SOLUTION INTRAMUSCULAR; INTRAVENOUS at 04:04

## 2021-04-08 RX ADMIN — PANTOPRAZOLE SODIUM 40 MILLIGRAM(S): 20 TABLET, DELAYED RELEASE ORAL at 11:29

## 2021-04-08 RX ADMIN — Medication 81 MILLIGRAM(S): at 11:29

## 2021-04-08 RX ADMIN — ALBUTEROL 2 PUFF(S): 90 AEROSOL, METERED ORAL at 20:21

## 2021-04-08 RX ADMIN — ENOXAPARIN SODIUM 40 MILLIGRAM(S): 100 INJECTION SUBCUTANEOUS at 05:02

## 2021-04-08 RX ADMIN — Medication 1000 MILLIGRAM(S): at 11:28

## 2021-04-08 RX ADMIN — LACTULOSE 20 GRAM(S): 10 SOLUTION ORAL at 17:10

## 2021-04-08 RX ADMIN — MIDAZOLAM HYDROCHLORIDE 1.68 MG/KG/HR: 1 INJECTION, SOLUTION INTRAMUSCULAR; INTRAVENOUS at 16:00

## 2021-04-08 RX ADMIN — FENTANYL CITRATE 41.9 MICROGRAM(S)/KG/HR: 50 INJECTION INTRAVENOUS at 18:33

## 2021-04-08 RX ADMIN — Medication 40 MILLIGRAM(S): at 05:01

## 2021-04-08 RX ADMIN — PHENYLEPHRINE HYDROCHLORIDE 7.86 MICROGRAM(S)/KG/MIN: 10 INJECTION INTRAVENOUS at 02:58

## 2021-04-08 RX ADMIN — SENNA PLUS 2 TABLET(S): 8.6 TABLET ORAL at 21:25

## 2021-04-08 RX ADMIN — SODIUM CHLORIDE 500 MILLILITER(S): 9 INJECTION, SOLUTION INTRAVENOUS at 02:07

## 2021-04-08 RX ADMIN — POTASSIUM PHOSPHATE, MONOBASIC POTASSIUM PHOSPHATE, DIBASIC 62.5 MILLIMOLE(S): 236; 224 INJECTION, SOLUTION INTRAVENOUS at 11:27

## 2021-04-08 RX ADMIN — PROPOFOL 15.1 MICROGRAM(S)/KG/MIN: 10 INJECTION, EMULSION INTRAVENOUS at 11:28

## 2021-04-08 RX ADMIN — ALBUTEROL 2 PUFF(S): 90 AEROSOL, METERED ORAL at 08:31

## 2021-04-08 RX ADMIN — PROPOFOL 15.1 MICROGRAM(S)/KG/MIN: 10 INJECTION, EMULSION INTRAVENOUS at 04:09

## 2021-04-08 RX ADMIN — Medication 133 MILLILITER(S): at 11:27

## 2021-04-08 RX ADMIN — POLYETHYLENE GLYCOL 3350 17 GRAM(S): 17 POWDER, FOR SOLUTION ORAL at 11:32

## 2021-04-08 RX ADMIN — ALBUTEROL 2 PUFF(S): 90 AEROSOL, METERED ORAL at 04:18

## 2021-04-08 RX ADMIN — Medication 2 MILLIGRAM(S): at 05:02

## 2021-04-08 RX ADMIN — Medication 30 MILLIGRAM(S): at 05:02

## 2021-04-08 NOTE — PROGRESS NOTE ADULT - PROBLEM SELECTOR PLAN 3
2/2 covid-19 PNA; comorbid L pneumothorax. CXR indicates similar patchy infiltrates with small left pneumothorax occupying approximately 10% of the left chest. Patient remains sedated/intubated (ETT 3/29); on pressor, prednisone. High risk of mortality. Full code.

## 2021-04-08 NOTE — CHART NOTE - NSCHARTNOTEFT_GEN_A_CORE
Assessment:   Patient is a 55y old  Male who presents with a chief complaint of SOB (2021 13:11). Covid/ Isolation. Intubated. TF noted @ 25 ml/hr, at times. Propofol 6 ml/hr (if x24 hrs=158 kcals fat)        Diet Prescription: Diet, NPO with Tube Feed:   Tube Feeding Modality: Nasogastric  Glucerna 1.5 Leonardo  Total Volume for 24 Hours (mL): 600  Continuous  Starting Tube Feed Rate {mL per Hour}: 10  Increase Tube Feed Rate by (mL): 10     Every hour  Until Goal Tube Feed Rate (mL per Hour): 25  Tube Feed Duration (in Hours): 24  Tube Feed Start Time: 02:00  No Carb Prosource (1pkg = 15gms Protein)     Qty per Day:  2 (21 @ 16:49)        Daily     Daily Weight in k (2021 07:30)  Weight in k.9 (2021 07:30)  Weight in k.2 (2021 08:00)  Weight in k (2021 07:00)  Weight in k.9 (2021 08:00)  Weight in k.9 (2021 08:00)  Weight in k.4 (2021 08:00)  Weight in k.3 (30 Mar 2021 07:00)  Weight in k.5 (29 Mar 2021 07:00)    % Weight Change: I>O, 3+ generalized edema    Pertinent Medications: MEDICATIONS  (STANDING):  ALBUTerol    90 MICROgram(s) HFA Inhaler 2 Puff(s) Inhalation every 6 hours  ascorbic acid 1000 milliGRAM(s) Oral daily  aspirin  chewable 81 milliGRAM(s) Oral daily  bisacodyl Suppository 10 milliGRAM(s) Rectal daily  chlorhexidine 0.12% Liquid 15 milliLiter(s) Oral Mucosa two times a day  chlorhexidine 2% Cloths 1 Application(s) Topical <User Schedule>  enoxaparin Injectable 40 milliGRAM(s) SubCutaneous two times a day  fentaNYL   Infusion. 5 MICROgram(s)/kG/Hr (41.9 mL/Hr) IV Continuous <Continuous>  furosemide   Injectable 40 milliGRAM(s) IV Push daily  hydrochlorothiazide 50 milliGRAM(s) Oral daily  insulin lispro (ADMELOG) corrective regimen sliding scale   SubCutaneous every 6 hours  lactulose Syrup 20 Gram(s) Oral two times a day  LORazepam     Tablet 2 milliGRAM(s) Oral two times a day  midazolam Infusion 0.02 mG/kG/Hr (1.68 mL/Hr) IV Continuous <Continuous>  mineral oil enema 133 milliLiter(s) Rectal daily  pantoprazole   Suspension 40 milliGRAM(s) Oral daily  phenylephrine    Infusion 0.5 MICROgram(s)/kG/Min (7.86 mL/Hr) IV Continuous <Continuous>  polyethylene glycol 3350 17 Gram(s) Oral daily  predniSONE   Tablet 30 milliGRAM(s) Oral daily  propofol Infusion 30.007 MICROgram(s)/kG/Min (15.1 mL/Hr) IV Continuous <Continuous>  senna 2 Tablet(s) Oral at bedtime    MEDICATIONS  (PRN):  acetaminophen    Suspension .. 650 milliGRAM(s) Oral every 6 hours PRN Temp greater or equal to 38C (100.4F)  sodium chloride 0.9% lock flush 10 milliLiter(s) IV Push every 1 hour PRN Pre/post blood products, medications, blood draw, and to maintain line patency    Pertinent Labs:  Na145 mmol/L Glu 140 mg/dL<H> K+ 3.4 mmol/L<L> Cr  0.35 mg/dL<L> BUN 24 mg/dL<H>  Phos 1.9 mg/dL<L>  Alb 1.3 g/dL<L>  Chol --    LDL --    HDL --    Trig 265 mg/dL<H>     CAPILLARY BLOOD GLUCOSE      POCT Blood Glucose.: 118 mg/dL (2021 16:27)  POCT Blood Glucose.: 137 mg/dL (2021 10:12)  POCT Blood Glucose.: 140 mg/dL (2021 06:09)  POCT Blood Glucose.: 146 mg/dL (2021 01:43)  POCT Blood Glucose.: 122 mg/dL (2021 17:01)      Estimated Needs:  (Using weight 74.5 kg)      Previous Nutrition Diagnosis:   [ ] Altered GI function  [ ]Inadequate Oral Intake [ ] Swallowing Difficulty   [ ] Altered nutrition related labs [ ] Increased Nutrient Needs [ ] Overweight/Obesity   [ ] Unintended Weight Loss [ ] Food & Nutrition Related Knowledge Deficit [x ] Malnutrition (severe PCM)  [ ] Other:     Nutrition Diagnosis is [x ] ongoing  [ ] resolved [ ] not applicable       Interventions:   Recommend  [ ] Change Diet To:  [ ] Nutrition Supplement  [x ] Nutrition Support: Glucerna 1.5  38x24= 912 ml, 1368 kcals, 75 gm protein). MD to monitor. RD available.   [Triglyceride monitoring on Propofol ] Other:     Monitoring and Evaluation:    [ x ] Tolerance to diet prescription [ x ] weights [ x ] labs[ x ] follow up per protocol  [ ] other:

## 2021-04-08 NOTE — PROGRESS NOTE ADULT - PROBLEM SELECTOR PLAN 6
2/2 acute illness. Patient sedated/intubated, on pressor, + L pneumothorax 2/2 covid-19 PNA. Dependent on ADLs. High risk of mortality. 2/2 acute illness. Patient sedated/intubated, on pressor, + L pneumothorax 2/2 covid-19 PNA. Plan for L chest tube. Dependent on ADLs. High risk of mortality.

## 2021-04-08 NOTE — PROGRESS NOTE ADULT - ATTENDING COMMENTS
55 yr old  man , non smoker with  moody 1990s presented 3/14 with x9 days worsening cough, subjective fevers, and SOB, with x2-3 days dysuria and central, non-radiating, constant CP. Admitted to medicine unit  for acute hypoxic respiratory failure secondary to pna from covid-19 infection .     Assessment:  1. Acute hypoxic respiratory failure  2 Covid-19 infection   3. Transaminitis  4. Prediabetes  5. Bilateral pneumothorax    Plan   -pat intubated 3/29   -Mechanical ventilation with lung protective strategy   -adjust vent as per ABG  -PTX  improved   -TLX changed 4/6  -diuresis and aim for negative fluid balance  -Taper off propofol given green urine  -Cont. versed for now, start PO benzos   -isolation : contact and air borne   -monitor biomarkers daily, trending down   -trial of semi- s/p pulse steroid .. solumedrol 250 bid x 3/3 days   -Taper prednisone   - Tube feedings, bowel regimen  -dvt/gi prophy  -not a candidate for remdesivir due to covid-19 antibodies and elevated LFT  -not a candidate for Tociluzimab due to elevated LFT  -hemodynamic monitoring   -NGT  - May need trach/peg if unable to extubate this weekend

## 2021-04-08 NOTE — PROGRESS NOTE ADULT - SUBJECTIVE AND OBJECTIVE BOX
Patient is a 55y old  Male who presents with a chief complaint of SOB (07 Apr 2021 11:27)    pt seen in icu [ x ], reg med floor [   ], bed [ x ], chair at bedside [   ], a+o x3 [  ], sedated [x  ],  nad [x  ]    andrea [ x ], ngt [x  ], et tube [ x ], cent line [x  ],        Allergies    No Known Allergies        Vitals    T(F): 98.1 (04-08-21 @ 05:30), Max: 99.6 (04-07-21 @ 15:58)  HR: 85 (04-08-21 @ 06:32) (64 - 117)  BP: 111/53 (04-08-21 @ 06:00) (78/40 - 126/70)  RR: 30 (04-08-21 @ 06:32) (16 - 31)  SpO2: 95% (04-08-21 @ 06:32) (88% - 99%)  Wt(kg): --  CAPILLARY BLOOD GLUCOSE      POCT Blood Glucose.: 140 mg/dL (08 Apr 2021 06:09)      Labs                          9.1    10.29 )-----------( 146      ( 08 Apr 2021 04:13 )             29.0       04-08    145  |  100  |  24<H>  ----------------------------<  140<H>  3.4<L>   |  >45<HH>  |  0.35<L>    Ca    7.6<L>      08 Apr 2021 04:13  Phos  1.9     04-08  Mg     2.1     04-08    TPro  4.5<L>  /  Alb  1.3<L>  /  TBili  0.4  /  DBili  x   /  AST  30  /  ALT  48  /  AlkPhos  84  04-08            .Urine Clean Catch (Midstream)  03-15 @ 00:52   No growth  --  --          Radiology Results      Meds    MEDICATIONS  (STANDING):  ALBUTerol    90 MICROgram(s) HFA Inhaler 2 Puff(s) Inhalation every 6 hours  ascorbic acid 1000 milliGRAM(s) Oral daily  aspirin  chewable 81 milliGRAM(s) Oral daily  bisacodyl Suppository 10 milliGRAM(s) Rectal daily  chlorhexidine 0.12% Liquid 15 milliLiter(s) Oral Mucosa two times a day  chlorhexidine 2% Cloths 1 Application(s) Topical <User Schedule>  enoxaparin Injectable 40 milliGRAM(s) SubCutaneous two times a day  fentaNYL   Infusion. 5 MICROgram(s)/kG/Hr (41.9 mL/Hr) IV Continuous <Continuous>  furosemide   Injectable 40 milliGRAM(s) IV Push two times a day  hydrochlorothiazide 50 milliGRAM(s) Oral daily  insulin lispro (ADMELOG) corrective regimen sliding scale   SubCutaneous every 6 hours  LORazepam     Tablet 2 milliGRAM(s) Oral two times a day  midazolam Infusion 0.02 mG/kG/Hr (1.68 mL/Hr) IV Continuous <Continuous>  mineral oil enema 133 milliLiter(s) Rectal daily  pantoprazole   Suspension 40 milliGRAM(s) Oral daily  phenylephrine    Infusion 0.5 MICROgram(s)/kG/Min (7.86 mL/Hr) IV Continuous <Continuous>  polyethylene glycol 3350 17 Gram(s) Oral daily  predniSONE   Tablet 30 milliGRAM(s) Oral daily  propofol Infusion 30.007 MICROgram(s)/kG/Min (15.1 mL/Hr) IV Continuous <Continuous>  senna 2 Tablet(s) Oral at bedtime      MEDICATIONS  (PRN):  acetaminophen    Suspension .. 650 milliGRAM(s) Oral every 6 hours PRN Temp greater or equal to 38C (100.4F)  sodium chloride 0.9% lock flush 10 milliLiter(s) IV Push every 1 hour PRN Pre/post blood products, medications, blood draw, and to maintain line patency      Physical Exam    Neuro :  no focal deficits  Respiratory: CTA B/L  CV: RRR, S1S2, no murmurs,   Abdominal: Soft, NT, ND +BS,  Extremities: No edema, + peripheral pulses      ASSESSMENT    Hypoxemia 2nd to covid pna   transaminitis  prediabetes  h/o appendectomy  cholecystectomy        PLAN    contact and airborne isolation  d/c remdesevir given covid ab positive noted   completed dexamethasone   started pulse steroids for 3 days - 250mg solumedrol bid now tapered to 40mg daily  cont asa, vit c,    cont albuterol inhaler   pulm f/u  procalcitonin, D-dimer, crp, ldh, ferritin, lactate noted ,    tmx 99.6  cont tylenol prn,   cont robitussin prn   pt on fentanyl, midazolam and propofol drip  s/p intubation 3/29/21  O2 sat (84% - 98%) mech vent  O2 via mech vent and taper fio2 as tolerated   abg noted above  vent mgmt as per icu  xray 3/19/21 with pneumomediastinum  rept cxr with New trace right apical pneumothorax. New mild left apical pneumothorax. Grossly stable small pneumomediastinum.  Soft tissue emphysema at the neck bases bilaterally. Grossly stable bilateral pulmonary infiltrates noted.   cxr 2/24 with No evidence of pneumothorax can be appreciated on the available image. This may be related to patient positioning. Evidence of pneumomediastinum and subcutaneous emphysema in the lower neck is again noted. There are patchy bibasilar infiltrates and elevated right hemidiaphragm noted.   cxr 3/29/21 with No significant change bilateral infiltrates. There is a small simple left apical pneumothorax. No significant pleural effusion. Bilateral subcutaneous emphysema similar to prior.   thoracic surg f/u   pt intubated 6AM 3/29/21, XRs on 7AM and 5PM with no obvious increase of ptx  no immediate need for CT at this time,   recommend close monitoring with serial CXRs   cxr 4/4/21 with Improving bilateral airspace disease noted   lispro ss   prognosis poor  cont current meds       Patient is a 55y old  Male who presents with a chief complaint of SOB (07 Apr 2021 11:27)    pt seen in icu [ x ], reg med floor [   ], bed [ x ], chair at bedside [   ], a+o x3 [  ], sedated [x  ],  nad [x  ]    andrea [ x ], ngt [x  ], et tube [ x ], cent line [x  ],        Allergies    No Known Allergies        Vitals    T(F): 98.1 (04-08-21 @ 05:30), Max: 99.6 (04-07-21 @ 15:58)  HR: 85 (04-08-21 @ 06:32) (64 - 117)  BP: 111/53 (04-08-21 @ 06:00) (78/40 - 126/70)  RR: 30 (04-08-21 @ 06:32) (16 - 31)  SpO2: 95% (04-08-21 @ 06:32) (88% - 99%)  Wt(kg): --  CAPILLARY BLOOD GLUCOSE      POCT Blood Glucose.: 140 mg/dL (08 Apr 2021 06:09)      Labs                          9.1    10.29 )-----------( 146      ( 08 Apr 2021 04:13 )             29.0       04-08    145  |  100  |  24<H>  ----------------------------<  140<H>  3.4<L>   |  >45<HH>  |  0.35<L>    Ca    7.6<L>      08 Apr 2021 04:13  Phos  1.9     04-08  Mg     2.1     04-08    TPro  4.5<L>  /  Alb  1.3<L>  /  TBili  0.4  /  DBili  x   /  AST  30  /  ALT  48  /  AlkPhos  84  04-08            .Urine Clean Catch (Midstream)  03-15 @ 00:52   No growth  --  --          Radiology Results      Meds    MEDICATIONS  (STANDING):  ALBUTerol    90 MICROgram(s) HFA Inhaler 2 Puff(s) Inhalation every 6 hours  ascorbic acid 1000 milliGRAM(s) Oral daily  aspirin  chewable 81 milliGRAM(s) Oral daily  bisacodyl Suppository 10 milliGRAM(s) Rectal daily  chlorhexidine 0.12% Liquid 15 milliLiter(s) Oral Mucosa two times a day  chlorhexidine 2% Cloths 1 Application(s) Topical <User Schedule>  enoxaparin Injectable 40 milliGRAM(s) SubCutaneous two times a day  fentaNYL   Infusion. 5 MICROgram(s)/kG/Hr (41.9 mL/Hr) IV Continuous <Continuous>  furosemide   Injectable 40 milliGRAM(s) IV Push two times a day  hydrochlorothiazide 50 milliGRAM(s) Oral daily  insulin lispro (ADMELOG) corrective regimen sliding scale   SubCutaneous every 6 hours  LORazepam     Tablet 2 milliGRAM(s) Oral two times a day  midazolam Infusion 0.02 mG/kG/Hr (1.68 mL/Hr) IV Continuous <Continuous>  mineral oil enema 133 milliLiter(s) Rectal daily  pantoprazole   Suspension 40 milliGRAM(s) Oral daily  phenylephrine    Infusion 0.5 MICROgram(s)/kG/Min (7.86 mL/Hr) IV Continuous <Continuous>  polyethylene glycol 3350 17 Gram(s) Oral daily  predniSONE   Tablet 30 milliGRAM(s) Oral daily  propofol Infusion 30.007 MICROgram(s)/kG/Min (15.1 mL/Hr) IV Continuous <Continuous>  senna 2 Tablet(s) Oral at bedtime      MEDICATIONS  (PRN):  acetaminophen    Suspension .. 650 milliGRAM(s) Oral every 6 hours PRN Temp greater or equal to 38C (100.4F)  sodium chloride 0.9% lock flush 10 milliLiter(s) IV Push every 1 hour PRN Pre/post blood products, medications, blood draw, and to maintain line patency      Physical Exam    Neuro :  no focal deficits  Respiratory: CTA B/L  CV: RRR, S1S2, no murmurs,   Abdominal: Soft, NT, ND +BS,  Extremities: No edema, + peripheral pulses      ASSESSMENT    Hypoxemia 2nd to covid pna   transaminitis  prediabetes  h/o appendectomy  cholecystectomy        PLAN    contact and airborne isolation  d/c remdesevir given covid ab positive noted   completed dexamethasone   started pulse steroids for 3 days - 250mg solumedrol bid now tapered to 30mg daily  cont asa, vit c,    cont albuterol inhaler   pulm f/u  procalcitonin, D-dimer, crp, ldh, ferritin, lactate noted ,    tmx 99.6  cont tylenol prn,   cont robitussin prn   pt on fentanyl, phenylephrine, midazolam and propofol drip  s/p intubation 3/29/21  O2 sat (84% - 98%) mech vent  O2 via mech vent and taper fio2 as tolerated   serial abg'sabg   vent mgmt as per icu  xray 3/19/21 with pneumomediastinum  rept cxr with New trace right apical pneumothorax. New mild left apical pneumothorax. Grossly stable small pneumomediastinum.  Soft tissue emphysema at the neck bases bilaterally. Grossly stable bilateral pulmonary infiltrates noted.   cxr 2/24 with No evidence of pneumothorax can be appreciated on the available image. This may be related to patient positioning. Evidence of pneumomediastinum and subcutaneous emphysema in the lower neck is again noted. There are patchy bibasilar infiltrates and elevated right hemidiaphragm noted.   cxr 3/29/21 with No significant change bilateral infiltrates. There is a small simple left apical pneumothorax. No significant pleural effusion. Bilateral subcutaneous emphysema similar to prior.   thoracic surg f/u   pt intubated 6AM 3/29/21, XRs on 7AM and 5PM with no obvious increase of ptx  no immediate need for CT at this time,   recommend close monitoring with serial CXRs   cxr 4/4/21 with Improving bilateral airspace disease noted   lispro ss   prognosis poor  cont current meds

## 2021-04-08 NOTE — PROGRESS NOTE ADULT - ASSESSMENT
ASSESSMENT AND PLAN:  55M, no PMH, PSH appy and moody 1990s presented 3/14 with x9 days worsening cough, subjective fevers, and SOB, with x2-3 days dysuria and central, non-radiating, constant CP. Admitted to [facility] for acute hypoxic respiratory failure s/t covid pneumonia, ICU consulted for increasing work of breathing on 15 lpm NRM.     1. Acute hypoxic respiratory failure  2. ARDS 2/2 Covid pneumonia  3. Transaminitis  4. Prediabetes  5. Abnormal TSH    =================== Neuro============================  Alert and oriented x 3 at baseline     intubated and sedated 3/29   Propofol and fentanyl, Restarted on Versed for vent synchrony   Add Ativan 2mg PO to help taper off Versed , Continue      ================= Cardiovascular==========================  Hypertension  not on any pressor support, maintaining map above 60\  s/p Labetalol 20mg IVP for high BP  s/p Lopressor 5mg IVP x 1 4/6      TACHYCARDIA:  pT HR in 80s   will keep monitoring         ================- Pulm=================================  Acute hypoxic respiratory failure: secondary to covid pneumonia  -was on  HFNr then got intubated 3/29  -D-dimer initially elevated on presentation, now wnl  - ABG show metabolic alkalosis pattern with elevated bicarb   - Will decrease TV and repeat ABG     -Remdesivir was discontinued due to positive antibodies   - add albuterol prn   -procalcitonin wnl, d-dimer in 300s  -started pulse steroids for 3 days - 250mg solumedrol bid- completed3/27  - solumedrol 40 once daily, Will taper now   - Finished Dexamethasone     #Pneumothorax and pneumomediastinum:  Resolved   X-ray chest: tiny left apical pneumothorax  Thoracic surgery consulted recommended no intervention at this time, just observation for now  X-ray monitoring daily    ==================ID===================================  Covid pneumonia  -leukocytosis resolved  -plan as above     ================= Nephro================================  -No issues   -monitor lytes, SCr   -monitor I/Os,   -Net negative , Urine output monitoring  -Start Lasix 40mg Daily, cut down to daily and HCTZ 50mg     =================GI====================================  Transaminitis:   likely secondary to Covid   - and  on presentation  -Improved  -continue to monitor,  -  hepatitis panel -ve    diet:   ng tube and tube feed  bowel regimen,   Last BM 4/6    ================ Heme==================================  Elevated d-dimer: likely secondary to Covid  -d-dimer 423 on presentation, now wnl  -continue prophylactic Lovenox 40 mg bid    =================Endocrine===============================  Prediabetes:    -A1c  5.8  -BS controlled  -continue HSS  -monitor FS while on steroids    Abnormal TSH:  -TSH level noted 0.26,   -TSH 0.37 and   - Free T4 1.82      ================= Skin/Catheters============================  No rashes. Peripheral IV lines.   LIj placed 4/6    - =================Prophylaxis =============================  Lovenox for DVT proph  Protonix for  GI proph    ==================GOC==================================   FULL CODE    Spoke with patient/s wife Rosa and grand daughter, she wants her son to be the POC

## 2021-04-08 NOTE — PROGRESS NOTE ADULT - PROBLEM SELECTOR PLAN 2
Per CXR, patient with small left pneumothorax occupying approximately 10% of the left chest; 2/2 covid-19. Plan for pigtail. On lovenox, ASA. Patient with high risk of mortality. Full code. Per CXR, patient with small left pneumothorax occupying approximately 10% of the left chest; 2/2 covid-19. Plan for L chest tube. On lovenox, ASA. Patient with high risk of mortality. Full code.

## 2021-04-08 NOTE — PROGRESS NOTE ADULT - ASSESSMENT
56 y/o male COVID+ intubated/sedated, with left pneumothorax appreciated on AM CXR. Bedside US performed by ICU fellow appreciating pneumothorax, repeat CXR ordered and reviewed with Dr. Melchor   -ICU fellow L chest tube placement  -F/u CXR post placement, monitor site of placement   -Daily CXR  -Monitor O2 status   -Will follow  -Discussed with Dr. Melchor who agrees

## 2021-04-08 NOTE — PROGRESS NOTE ADULT - SUBJECTIVE AND OBJECTIVE BOX
INTERVAL HPI/OVERNIGHT EVENTS:  Patient seen and examined at bedside.   Intubated/sedated, vent settings appreciated     MEDICATIONS  (STANDING):  ALBUTerol    90 MICROgram(s) HFA Inhaler 2 Puff(s) Inhalation every 6 hours  ascorbic acid 1000 milliGRAM(s) Oral daily  aspirin  chewable 81 milliGRAM(s) Oral daily  bisacodyl Suppository 10 milliGRAM(s) Rectal daily  chlorhexidine 0.12% Liquid 15 milliLiter(s) Oral Mucosa two times a day  chlorhexidine 2% Cloths 1 Application(s) Topical <User Schedule>  fentaNYL   Infusion. 5 MICROgram(s)/kG/Hr (41.9 mL/Hr) IV Continuous <Continuous>  furosemide   Injectable 40 milliGRAM(s) IV Push daily  hydrochlorothiazide 50 milliGRAM(s) Oral daily  insulin lispro (ADMELOG) corrective regimen sliding scale   SubCutaneous every 6 hours  lactulose Syrup 20 Gram(s) Oral two times a day  LORazepam     Tablet 2 milliGRAM(s) Oral two times a day  midazolam Infusion 0.02 mG/kG/Hr (1.68 mL/Hr) IV Continuous <Continuous>  mineral oil enema 133 milliLiter(s) Rectal daily  pantoprazole   Suspension 40 milliGRAM(s) Oral daily  phenylephrine    Infusion 0.5 MICROgram(s)/kG/Min (7.86 mL/Hr) IV Continuous <Continuous>  polyethylene glycol 3350 17 Gram(s) Oral daily  predniSONE   Tablet 30 milliGRAM(s) Oral daily  propofol Infusion 30.007 MICROgram(s)/kG/Min (15.1 mL/Hr) IV Continuous <Continuous>  senna 2 Tablet(s) Oral at bedtime    MEDICATIONS  (PRN):  acetaminophen    Suspension .. 650 milliGRAM(s) Oral every 6 hours PRN Temp greater or equal to 38C (100.4F)  sodium chloride 0.9% lock flush 10 milliLiter(s) IV Push every 1 hour PRN Pre/post blood products, medications, blood draw, and to maintain line patency      Vital Signs Last 24 Hrs  T(C): 37.1 (08 Apr 2021 16:45), Max: 37.7 (08 Apr 2021 09:00)  T(F): 98.7 (08 Apr 2021 16:45), Max: 99.8 (08 Apr 2021 09:00)  HR: 101 (08 Apr 2021 17:00) (64 - 117)  BP: 110/52 (08 Apr 2021 16:00) (78/40 - 126/70)  BP(mean): 65 (08 Apr 2021 16:00) (49 - 84)  RR: 30 (08 Apr 2021 17:00) (15 - 31)  SpO2: 93% (08 Apr 2021 17:00) (83% - 99%)    Physical:  General: Sedated  Respirations: Intubated    I&O's Detail    07 Apr 2021 07:01  -  08 Apr 2021 07:00  --------------------------------------------------------  IN:    Enteral Tube Flush: 100 mL    FentaNYL: 1008 mL    Glucerna 1.5: 575 mL    Midazolam: 281.3 mL    Phenylephrine: 31.5 mL    Propofol: 178.8 mL  Total IN: 2174.6 mL    OUT:    Indwelling Catheter - Urethral (mL): 4190 mL    Nasogastric/Oral tube (mL): 100 mL  Total OUT: 4290 mL    Total NET: -2115.4 mL      08 Apr 2021 07:01  -  08 Apr 2021 17:32  --------------------------------------------------------  IN:    Enteral Tube Flush: 50 mL    FentaNYL: 378 mL    Glucerna 1.5: 200 mL    Midazolam: 84 mL    Phenylephrine: 3.2 mL    Propofol: 54.4 mL  Total IN: 769.6 mL    OUT:    Indwelling Catheter - Urethral (mL): 375 mL  Total OUT: 375 mL    Total NET: 394.6 mL          LABS:                        9.1    10.29 )-----------( 146      ( 08 Apr 2021 04:13 )             29.0             04-08    145  |  100  |  24<H>  ----------------------------<  140<H>  3.4<L>   |  >45<HH>  |  0.35<L>    Ca    7.6<L>      08 Apr 2021 04:13  Phos  1.9     04-08  Mg     2.1     04-08    TPro  4.5<L>  /  Alb  1.3<L>  /  TBili  0.4  /  DBili  x   /  AST  30  /  ALT  48  /  AlkPhos  84  04-08      < from: Xray Chest 1 View- PORTABLE-Routine (Xray Chest 1 View- PORTABLE-Routine in AM.) (04.08.21 @ 07:15) >  EXAM:  XR CHEST PORTABLE ROUTINE 1V                            PROCEDURE DATE:  04/08/2021          INTERPRETATION:  Chest one view    HISTORY: Hypoxemia    COMPARISON STUDY: 4/7/2021    Frontal expiratory view of the chest shows the heart to be normal in size. Endotracheal tube, left jugular line and feeding tube remain present.    The lungs show similar patchy infiltrates with small left pneumothorax occupying approximately 10% of the left chest and there is no evidence of right pneumothorax nor pleural effusion.    IMPRESSION:  Small left pneumothorax.    The above finding was conveyed by telephone to Dr. Gage at 2:30 PM on the day of the examination.    Thank you for the courtesy of this referral.            STEFANO SALAS MD; Attending Interventional Radiologist  This document has been electronically signed. Apr 8 2021  2:33PM    < end of copied text >

## 2021-04-08 NOTE — PROGRESS NOTE ADULT - SUBJECTIVE AND OBJECTIVE BOX
INTERVAL HPI/OVERNIGHT EVENTS:     PRESSORS: [ ] YES [ ] NO  WHICH:    ANTIBIOTICS:                  DATE STARTED:  ANTIBIOTICS:                  DATE STARTED:  ANTIBIOTICS:                  DATE STARTED:    Antimicrobial:    Cardiovascular:  furosemide   Injectable 40 milliGRAM(s) IV Push daily  hydrochlorothiazide 50 milliGRAM(s) Oral daily  phenylephrine    Infusion 0.5 MICROgram(s)/kG/Min IV Continuous <Continuous>    Pulmonary:  ALBUTerol    90 MICROgram(s) HFA Inhaler 2 Puff(s) Inhalation every 6 hours    Hematalogic:  aspirin  chewable 81 milliGRAM(s) Oral daily  enoxaparin Injectable 40 milliGRAM(s) SubCutaneous two times a day    Other:  acetaminophen    Suspension .. 650 milliGRAM(s) Oral every 6 hours PRN  ascorbic acid 1000 milliGRAM(s) Oral daily  bisacodyl Suppository 10 milliGRAM(s) Rectal daily  chlorhexidine 0.12% Liquid 15 milliLiter(s) Oral Mucosa two times a day  chlorhexidine 2% Cloths 1 Application(s) Topical <User Schedule>  fentaNYL   Infusion. 5 MICROgram(s)/kG/Hr IV Continuous <Continuous>  insulin lispro (ADMELOG) corrective regimen sliding scale   SubCutaneous every 6 hours  LORazepam     Tablet 2 milliGRAM(s) Oral two times a day  midazolam Infusion 0.02 mG/kG/Hr IV Continuous <Continuous>  mineral oil enema 133 milliLiter(s) Rectal daily  pantoprazole   Suspension 40 milliGRAM(s) Oral daily  polyethylene glycol 3350 17 Gram(s) Oral daily  predniSONE   Tablet 30 milliGRAM(s) Oral daily  propofol Infusion 30.007 MICROgram(s)/kG/Min IV Continuous <Continuous>  senna 2 Tablet(s) Oral at bedtime  sodium chloride 0.9% lock flush 10 milliLiter(s) IV Push every 1 hour PRN    acetaminophen    Suspension .. 650 milliGRAM(s) Oral every 6 hours PRN  ALBUTerol    90 MICROgram(s) HFA Inhaler 2 Puff(s) Inhalation every 6 hours  ascorbic acid 1000 milliGRAM(s) Oral daily  aspirin  chewable 81 milliGRAM(s) Oral daily  bisacodyl Suppository 10 milliGRAM(s) Rectal daily  chlorhexidine 0.12% Liquid 15 milliLiter(s) Oral Mucosa two times a day  chlorhexidine 2% Cloths 1 Application(s) Topical <User Schedule>  enoxaparin Injectable 40 milliGRAM(s) SubCutaneous two times a day  fentaNYL   Infusion. 5 MICROgram(s)/kG/Hr IV Continuous <Continuous>  furosemide   Injectable 40 milliGRAM(s) IV Push daily  hydrochlorothiazide 50 milliGRAM(s) Oral daily  insulin lispro (ADMELOG) corrective regimen sliding scale   SubCutaneous every 6 hours  LORazepam     Tablet 2 milliGRAM(s) Oral two times a day  midazolam Infusion 0.02 mG/kG/Hr IV Continuous <Continuous>  mineral oil enema 133 milliLiter(s) Rectal daily  pantoprazole   Suspension 40 milliGRAM(s) Oral daily  phenylephrine    Infusion 0.5 MICROgram(s)/kG/Min IV Continuous <Continuous>  polyethylene glycol 3350 17 Gram(s) Oral daily  predniSONE   Tablet 30 milliGRAM(s) Oral daily  propofol Infusion 30.007 MICROgram(s)/kG/Min IV Continuous <Continuous>  senna 2 Tablet(s) Oral at bedtime  sodium chloride 0.9% lock flush 10 milliLiter(s) IV Push every 1 hour PRN    Drug Dosing Weight  Height (cm): 167.6 (14 Mar 2021 12:03)  Weight (kg): 83.869 (14 Mar 2021 12:03)  BMI (kg/m2): 29.9 (14 Mar 2021 12:03)  BSA (m2): 1.93 (14 Mar 2021 12:03)    CENTRAL LINE: [ ] YES [ ] NO  LOCATION:   DATE INSERTED:  REMOVE: [ ] YES [ ] NO  EXPLAIN:    RIVERA: [ ] YES [ ] NO    DATE INSERTED:  REMOVE:  [ ] YES [ ] NO  EXPLAIN:    A-LINE:  [ ] YES [ ] NO  LOCATION:   DATE INSERTED:  REMOVE:  [ ] YES [ ] NO  EXPLAIN:    PMH -reviewed admission note, no change since admission  PAST MEDICAL & SURGICAL HISTORY:  No pertinent past medical history    S/P moody  1990s    S/P appendectomy  1990s        ICU Vital Signs Last 24 Hrs  T(C): 36.7 (08 Apr 2021 05:30), Max: 37.6 (07 Apr 2021 15:58)  T(F): 98.1 (08 Apr 2021 05:30), Max: 99.6 (07 Apr 2021 15:58)  HR: 104 (08 Apr 2021 10:15) (64 - 117)  BP: 125/63 (08 Apr 2021 10:00) (78/40 - 126/70)  BP(mean): 78 (08 Apr 2021 10:00) (49 - 82)  ABP: 123/64 (08 Apr 2021 10:15) (79/42 - 134/73)  ABP(mean): 83 (08 Apr 2021 10:15) (54 - 92)  RR: 27 (08 Apr 2021 10:15) (16 - 31)  SpO2: 89% (08 Apr 2021 10:15) (88% - 99%)      ABG - ( 08 Apr 2021 04:02 )  pH, Arterial: 7.41  pH, Blood: x     /  pCO2: 80    /  pO2: 88    / HCO3: 51    / Base Excess: 21.2  /  SaO2: 98                    04-07 @ 07:01  -  04-08 @ 07:00  --------------------------------------------------------  IN: 2174.6 mL / OUT: 4290 mL / NET: -2115.4 mL        Mode: AC/ CMV (Assist Control/ Continuous Mandatory Ventilation)  RR (machine): 30  FiO2: 60  PEEP: 10  ITime: 1  MAP: 17  PC: 18  PIP: 29      PHYSICAL EXAM:    GENERAL: [ ]NAD, [ ]well-groomed, [ ]well-developed  HEAD:  [ ]Atraumatic, [ ]Normocephalic  EYES: [ ]EOMI, [ ]PERRLA, [ ]conjunctiva and sclera clear  ENMT: [ ]No tonsillar erythema, exudates, or enlargement; [ ]Moist mucous membranes, [ ]Good dentition, [ ]No lesions  NECK: [ ]Supple, normal appearance, [ ]No JVD; [ ]Normal thyroid; [ ]Trachea midline  NERVOUS SYSTEM:  [ ]Alert & Oriented X3, [ ]Good concentration; [ ]Motor Strength 5/5 B/L upper and lower extremities; [ ]DTRs 2+ intact and symmetric  CHEST/LUNG: [ ]No chest deformity; [ ]Normal percussion bilaterally; [ ]No rales, rhonchi, wheezing   HEART: [ ]Regular rate and rhythm; [ ]No murmurs, rubs, or gallops  ABDOMEN: [ ]Soft, Nontender, Nondistended; [ ]Bowel sounds present  EXTREMITIES:  [ ]2+ Peripheral Pulses, [ ]No clubbing, cyanosis, or edema  LYMPH: [ ]No lymphadenopathy noted  SKIN: [ ]No rashes or lesions; [ ]Good capillary refill      LABS:  CBC Full  -  ( 08 Apr 2021 04:13 )  WBC Count : 10.29 K/uL  RBC Count : 2.89 M/uL  Hemoglobin : 9.1 g/dL  Hematocrit : 29.0 %  Platelet Count - Automated : 146 K/uL  Mean Cell Volume : 100.3 fl  Mean Cell Hemoglobin : 31.5 pg  Mean Cell Hemoglobin Concentration : 31.4 gm/dL  Auto Neutrophil # : 9.09 K/uL  Auto Lymphocyte # : 0.56 K/uL  Auto Monocyte # : 0.15 K/uL  Auto Eosinophil # : 0.01 K/uL  Auto Basophil # : 0.02 K/uL  Auto Neutrophil % : 88.3 %  Auto Lymphocyte % : 5.4 %  Auto Monocyte % : 1.5 %  Auto Eosinophil % : 0.1 %  Auto Basophil % : 0.2 %    04-08    145  |  100  |  24<H>  ----------------------------<  140<H>  3.4<L>   |  >45<HH>  |  0.35<L>    Ca    7.6<L>      08 Apr 2021 04:13  Phos  1.9     04-08  Mg     2.1     04-08    TPro  4.5<L>  /  Alb  1.3<L>  /  TBili  0.4  /  DBili  x   /  AST  30  /  ALT  48  /  AlkPhos  84  04-08            RADIOLOGY & ADDITIONAL STUDIES REVIEWED:  ***    [ ]GOALS OF CARE DISCUSSION WITH PATIENT/FAMILY/PROXY:    CRITICAL CARE TIME SPENT: 35 minutes INTERVAL HPI/OVERNIGHT EVENTS: BP was on lower side, s/p LR 500ml   Elevated bicarb level     PRESSORS: [ ] YES [x ] NO  WHICH:    ANTIBIOTICS:                  DATE STARTED:  ANTIBIOTICS:                  DATE STARTED:  ANTIBIOTICS:                  DATE STARTED:    Antimicrobial:    Cardiovascular:  furosemide   Injectable 40 milliGRAM(s) IV Push daily  hydrochlorothiazide 50 milliGRAM(s) Oral daily  phenylephrine    Infusion 0.5 MICROgram(s)/kG/Min IV Continuous <Continuous>    Pulmonary:  ALBUTerol    90 MICROgram(s) HFA Inhaler 2 Puff(s) Inhalation every 6 hours    Hematalogic:  aspirin  chewable 81 milliGRAM(s) Oral daily  enoxaparin Injectable 40 milliGRAM(s) SubCutaneous two times a day    Other:  acetaminophen    Suspension .. 650 milliGRAM(s) Oral every 6 hours PRN  ascorbic acid 1000 milliGRAM(s) Oral daily  bisacodyl Suppository 10 milliGRAM(s) Rectal daily  chlorhexidine 0.12% Liquid 15 milliLiter(s) Oral Mucosa two times a day  chlorhexidine 2% Cloths 1 Application(s) Topical <User Schedule>  fentaNYL   Infusion. 5 MICROgram(s)/kG/Hr IV Continuous <Continuous>  insulin lispro (ADMELOG) corrective regimen sliding scale   SubCutaneous every 6 hours  LORazepam     Tablet 2 milliGRAM(s) Oral two times a day  midazolam Infusion 0.02 mG/kG/Hr IV Continuous <Continuous>  mineral oil enema 133 milliLiter(s) Rectal daily  pantoprazole   Suspension 40 milliGRAM(s) Oral daily  polyethylene glycol 3350 17 Gram(s) Oral daily  predniSONE   Tablet 30 milliGRAM(s) Oral daily  propofol Infusion 30.007 MICROgram(s)/kG/Min IV Continuous <Continuous>  senna 2 Tablet(s) Oral at bedtime  sodium chloride 0.9% lock flush 10 milliLiter(s) IV Push every 1 hour PRN    acetaminophen    Suspension .. 650 milliGRAM(s) Oral every 6 hours PRN  ALBUTerol    90 MICROgram(s) HFA Inhaler 2 Puff(s) Inhalation every 6 hours  ascorbic acid 1000 milliGRAM(s) Oral daily  aspirin  chewable 81 milliGRAM(s) Oral daily  bisacodyl Suppository 10 milliGRAM(s) Rectal daily  chlorhexidine 0.12% Liquid 15 milliLiter(s) Oral Mucosa two times a day  chlorhexidine 2% Cloths 1 Application(s) Topical <User Schedule>  enoxaparin Injectable 40 milliGRAM(s) SubCutaneous two times a day  fentaNYL   Infusion. 5 MICROgram(s)/kG/Hr IV Continuous <Continuous>  furosemide   Injectable 40 milliGRAM(s) IV Push daily  hydrochlorothiazide 50 milliGRAM(s) Oral daily  insulin lispro (ADMELOG) corrective regimen sliding scale   SubCutaneous every 6 hours  LORazepam     Tablet 2 milliGRAM(s) Oral two times a day  midazolam Infusion 0.02 mG/kG/Hr IV Continuous <Continuous>  mineral oil enema 133 milliLiter(s) Rectal daily  pantoprazole   Suspension 40 milliGRAM(s) Oral daily  phenylephrine    Infusion 0.5 MICROgram(s)/kG/Min IV Continuous <Continuous>  polyethylene glycol 3350 17 Gram(s) Oral daily  predniSONE   Tablet 30 milliGRAM(s) Oral daily  propofol Infusion 30.007 MICROgram(s)/kG/Min IV Continuous <Continuous>  senna 2 Tablet(s) Oral at bedtime  sodium chloride 0.9% lock flush 10 milliLiter(s) IV Push every 1 hour PRN    Drug Dosing Weight  Height (cm): 167.6 (14 Mar 2021 12:03)  Weight (kg): 83.869 (14 Mar 2021 12:03)  BMI (kg/m2): 29.9 (14 Mar 2021 12:03)  BSA (m2): 1.93 (14 Mar 2021 12:03)    CENTRAL LINE: [x ] YES [ ] NO  LOCATION: [facility]   DATE INSERTED: 4/6  REMOVE: [ ] YES [ ] NO  EXPLAIN:    RIVERA: [x ] YES [ ] NO    DATE INSERTED:  REMOVE:  [ ] YES [ ] NO  EXPLAIN:    A-LINE:  [x ] YES [ ] NO  LOCATION:   DATE INSERTED: [facility] 3/29  REMOVE:  [ ] YES [ ] NO  EXPLAIN:    PMH -reviewed admission note, no change since admission  PAST MEDICAL & SURGICAL HISTORY:  No pertinent past medical history    S/P moody  1990s    S/P appendectomy  1990s        ICU Vital Signs Last 24 Hrs  T(C): 36.7 (08 Apr 2021 05:30), Max: 37.6 (07 Apr 2021 15:58)  T(F): 98.1 (08 Apr 2021 05:30), Max: 99.6 (07 Apr 2021 15:58)  HR: 104 (08 Apr 2021 10:15) (64 - 117)  BP: 125/63 (08 Apr 2021 10:00) (78/40 - 126/70)  BP(mean): 78 (08 Apr 2021 10:00) (49 - 82)  ABP: 123/64 (08 Apr 2021 10:15) (79/42 - 134/73)  ABP(mean): 83 (08 Apr 2021 10:15) (54 - 92)  RR: 27 (08 Apr 2021 10:15) (16 - 31)  SpO2: 89% (08 Apr 2021 10:15) (88% - 99%)      ABG - ( 08 Apr 2021 04:02 )  pH, Arterial: 7.41  pH, Blood: x     /  pCO2: 80    /  pO2: 88    / HCO3: 51    / Base Excess: 21.2  /  SaO2: 98                    04-07 @ 07:01  -  04-08 @ 07:00  --------------------------------------------------------  IN: 2174.6 mL / OUT: 4290 mL / NET: -2115.4 mL        Mode: AC/ CMV (Assist Control/ Continuous Mandatory Ventilation)  RR (machine): 30  FiO2: 60  PEEP: 10  ITime: 1  MAP: 17  PC: 18  PIP: 29      PHYSICAL EXAM:    GENERAL: sedated and intubated  HEAD:  Atraumatic, Normocephalic  EYES: EOMI, PERRLA, conjunctiva and sclera clear  ENMT: No tonsillar erythema, exudates, or enlargement; blood at nostrils  NECK: Supple, normal appearance,   NERVOUS SYSTEM: sedated and intubated  CHEST/LUNG: Pt has breath sounds on both sides , Abdominal breath sounds   HEART: s1,s2  No murmurs, rubs, or gallops  ABDOMEN: Soft, Nontender, distended;  EXTREMITIES:  2+ Peripheral Pulses, No clubbing, getting more  edematous  LYMPH: No lymphadenopathy noted  SKIN: No rashes or lesions; Good capillary       LABS:  CBC Full  -  ( 08 Apr 2021 04:13 )  WBC Count : 10.29 K/uL  RBC Count : 2.89 M/uL  Hemoglobin : 9.1 g/dL  Hematocrit : 29.0 %  Platelet Count - Automated : 146 K/uL  Mean Cell Volume : 100.3 fl  Mean Cell Hemoglobin : 31.5 pg  Mean Cell Hemoglobin Concentration : 31.4 gm/dL  Auto Neutrophil # : 9.09 K/uL  Auto Lymphocyte # : 0.56 K/uL  Auto Monocyte # : 0.15 K/uL  Auto Eosinophil # : 0.01 K/uL  Auto Basophil # : 0.02 K/uL  Auto Neutrophil % : 88.3 %  Auto Lymphocyte % : 5.4 %  Auto Monocyte % : 1.5 %  Auto Eosinophil % : 0.1 %  Auto Basophil % : 0.2 %    04-08    145  |  100  |  24<H>  ----------------------------<  140<H>  3.4<L>   |  >45<HH>  |  0.35<L>    Ca    7.6<L>      08 Apr 2021 04:13  Phos  1.9     04-08  Mg     2.1     04-08    TPro  4.5<L>  /  Alb  1.3<L>  /  TBili  0.4  /  DBili  x   /  AST  30  /  ALT  48  /  AlkPhos  84  04-08            RADIOLOGY & ADDITIONAL STUDIES REVIEWED:  yes    [ ]GOALS OF CARE DISCUSSION WITH PATIENT/FAMILY/PROXY:    CRITICAL CARE TIME SPENT: 35 minutes

## 2021-04-08 NOTE — CHART NOTE - NSCHARTNOTEFT_GEN_A_CORE
Spoke with patient's brother via  and informed him of the recent pneumothorax. He endorsed understanding and gave a verbal consent

## 2021-04-08 NOTE — PROGRESS NOTE ADULT - PROBLEM SELECTOR PLAN 7
At this time, patient's brother/Idalgo ([phone]) is the identified surrogate. Patient with high risk of mortality. Full code.

## 2021-04-08 NOTE — PROGRESS NOTE ADULT - SUBJECTIVE AND OBJECTIVE BOX
OVERNIGHT EVENTS: b/l pneumothorax     Present Symptoms:   Review of Systems:  Unable to obtain due to poor mentation    MEDICATIONS  (STANDING):  ALBUTerol    90 MICROgram(s) HFA Inhaler 2 Puff(s) Inhalation every 6 hours  ascorbic acid 1000 milliGRAM(s) Oral daily  aspirin  chewable 81 milliGRAM(s) Oral daily  bisacodyl Suppository 10 milliGRAM(s) Rectal daily  chlorhexidine 0.12% Liquid 15 milliLiter(s) Oral Mucosa two times a day  chlorhexidine 2% Cloths 1 Application(s) Topical <User Schedule>  enoxaparin Injectable 40 milliGRAM(s) SubCutaneous two times a day  fentaNYL   Infusion. 5 MICROgram(s)/kG/Hr (41.9 mL/Hr) IV Continuous <Continuous>  furosemide   Injectable 40 milliGRAM(s) IV Push daily  hydrochlorothiazide 50 milliGRAM(s) Oral daily  insulin lispro (ADMELOG) corrective regimen sliding scale   SubCutaneous every 6 hours  lactulose Syrup 20 Gram(s) Oral two times a day  LORazepam     Tablet 2 milliGRAM(s) Oral two times a day  midazolam Infusion 0.02 mG/kG/Hr (1.68 mL/Hr) IV Continuous <Continuous>  mineral oil enema 133 milliLiter(s) Rectal daily  pantoprazole   Suspension 40 milliGRAM(s) Oral daily  phenylephrine    Infusion 0.5 MICROgram(s)/kG/Min (7.86 mL/Hr) IV Continuous <Continuous>  polyethylene glycol 3350 17 Gram(s) Oral daily  predniSONE   Tablet 30 milliGRAM(s) Oral daily  propofol Infusion 30.007 MICROgram(s)/kG/Min (15.1 mL/Hr) IV Continuous <Continuous>  senna 2 Tablet(s) Oral at bedtime    MEDICATIONS  (PRN):  acetaminophen    Suspension .. 650 milliGRAM(s) Oral every 6 hours PRN Temp greater or equal to 38C (100.4F)  sodium chloride 0.9% lock flush 10 milliLiter(s) IV Push every 1 hour PRN Pre/post blood products, medications, blood draw, and to maintain line patency      PHYSICAL EXAM:  Vital Signs Last 24 Hrs  T(C): 37.1 (08 Apr 2021 16:45), Max: 37.7 (08 Apr 2021 09:00)  T(F): 98.7 (08 Apr 2021 16:45), Max: 99.8 (08 Apr 2021 09:00)  HR: 101 (08 Apr 2021 17:00) (64 - 117)  BP: 110/52 (08 Apr 2021 16:00) (78/40 - 126/70)  BP(mean): 65 (08 Apr 2021 16:00) (49 - 84)  RR: 30 (08 Apr 2021 17:00) (15 - 31)  SpO2: 93% (08 Apr 2021 17:00) (83% - 99%)  General: alert  oriented x ____    [lethargic distressed cachexia  nonverbal  unarousable verbal]  Karnofsky Performance Score/Palliative Performance Status Version2:    %    HEENT: normal  dry mouth  ET tube/trach oral lesions:  Lungs: comfortable tachypnea/labored breathing  excessive secretions  CV: normal  tachycardia  GI: normal  distended  tender  incontinent               PEG/NG/OG tube  constipation  last BM:   : normal  incontinent  oliguria/anuria  andrea  Musculoskeletal: normal  weakness  edema             ambulatory  bedbound/wheelchair bound  Skin: normal  pressure ulcers: stage: edema: other:  Neuro: no deficits cognitive impairment dsyphagia/dysarthria paresis: other:  Oral intake ability: unable/only mouth care [minimal moderate full capability]  Diet: NPO,     LABS:                          9.1    10.29 )-----------( 146      ( 08 Apr 2021 04:13 )             29.0     04-08    145  |  100  |  24<H>  ----------------------------<  140<H>  3.4<L>   |  >45<HH>  |  0.35<L>    Ca    7.6<L>      08 Apr 2021 04:13  Phos  1.9     04-08  Mg     2.1     04-08    TPro  4.5<L>  /  Alb  1.3<L>  /  TBili  0.4  /  DBili  x   /  AST  30  /  ALT  48  /  AlkPhos  84  04-08        RADIOLOGY & ADDITIONAL STUDIES:    ADVANCE DIRECTIVES:  Advanced Care Planning discussion total time spent:     OVERNIGHT EVENTS: L pneumothorax     Present Symptoms:   Review of Systems:  Unable to obtain - patient sedated/intubated     MEDICATIONS  (STANDING):  ALBUTerol    90 MICROgram(s) HFA Inhaler 2 Puff(s) Inhalation every 6 hours  ascorbic acid 1000 milliGRAM(s) Oral daily  aspirin  chewable 81 milliGRAM(s) Oral daily  bisacodyl Suppository 10 milliGRAM(s) Rectal daily  chlorhexidine 0.12% Liquid 15 milliLiter(s) Oral Mucosa two times a day  chlorhexidine 2% Cloths 1 Application(s) Topical <User Schedule>  enoxaparin Injectable 40 milliGRAM(s) SubCutaneous two times a day  fentaNYL   Infusion. 5 MICROgram(s)/kG/Hr (41.9 mL/Hr) IV Continuous <Continuous>  furosemide   Injectable 40 milliGRAM(s) IV Push daily  hydrochlorothiazide 50 milliGRAM(s) Oral daily  insulin lispro (ADMELOG) corrective regimen sliding scale   SubCutaneous every 6 hours  lactulose Syrup 20 Gram(s) Oral two times a day  LORazepam     Tablet 2 milliGRAM(s) Oral two times a day  midazolam Infusion 0.02 mG/kG/Hr (1.68 mL/Hr) IV Continuous <Continuous>  mineral oil enema 133 milliLiter(s) Rectal daily  pantoprazole   Suspension 40 milliGRAM(s) Oral daily  phenylephrine    Infusion 0.5 MICROgram(s)/kG/Min (7.86 mL/Hr) IV Continuous <Continuous>  polyethylene glycol 3350 17 Gram(s) Oral daily  predniSONE   Tablet 30 milliGRAM(s) Oral daily  propofol Infusion 30.007 MICROgram(s)/kG/Min (15.1 mL/Hr) IV Continuous <Continuous>  senna 2 Tablet(s) Oral at bedtime    MEDICATIONS  (PRN):  acetaminophen    Suspension .. 650 milliGRAM(s) Oral every 6 hours PRN Temp greater or equal to 38C (100.4F)  sodium chloride 0.9% lock flush 10 milliLiter(s) IV Push every 1 hour PRN Pre/post blood products, medications, blood draw, and to maintain line patency      PHYSICAL EXAM:  Vital Signs Last 24 Hrs  T(C): 37.1 (08 Apr 2021 16:45), Max: 37.7 (08 Apr 2021 09:00)  T(F): 98.7 (08 Apr 2021 16:45), Max: 99.8 (08 Apr 2021 09:00)  HR: 101 (08 Apr 2021 17:00) (64 - 117)  BP: 110/52 (08 Apr 2021 16:00) (78/40 - 126/70)  BP(mean): 65 (08 Apr 2021 16:00) (49 - 84)  RR: 30 (08 Apr 2021 17:00) (15 - 31)  SpO2: 93% (08 Apr 2021 17:00) (83% - 99%)  General: Patient not medically stable for full physical exam 2/2 covid-19 status. Patient sedated/intubated, on pressor.    Karnofsky Performance Score/Palliative Performance Status Version2:     10%    HEENT:  ET tube   Lungs: tachypnea, on ventilator. Continues fentanyl, propofol,versed  CV: tachycardia, on pressor  GI:   incontinent, NGT  :   andrea  Musculoskeletal: patient sedated/intubated, dependent on ADLs  Skin: unable to assess   Neuro:  patient sedated/intubated, dependent on ADLs  Oral intake ability: unable/only mouth care   Diet: NPO; TF via NGT       LABS:                          9.1    10.29 )-----------( 146      ( 08 Apr 2021 04:13 )             29.0     04-08    145  |  100  |  24<H>  ----------------------------<  140<H>  3.4<L>   |  >45<HH>  |  0.35<L>    Ca    7.6<L>      08 Apr 2021 04:13  Phos  1.9     04-08  Mg     2.1     04-08    TPro  4.5<L>  /  Alb  1.3<L>  /  TBili  0.4  /  DBili  x   /  AST  30  /  ALT  48  /  AlkPhos  84  04-08      RADIOLOGY & ADDITIONAL STUDIES:  < from: Xray Chest 1 View- PORTABLE-Routine (Xray Chest 1 View- PORTABLE-Routine in AM.) (04.08.21 @ 07:15) >  EXAM:  XR CHEST PORTABLE ROUTINE 1V                        PROCEDURE DATE:  04/08/2021    INTERPRETATION:  Chest one view  HISTORY: Hypoxemia  COMPARISON STUDY: 4/7/2021  Frontal expiratory view of the chest shows the heart to be normal in size. Endotracheal tube, left jugular line and feeding tube remain present.  The lungs show similar patchy infiltrates with small left pneumothorax occupying approximately 10% of the left chest and there is no evidence of right pneumothorax nor pleural effusion.    IMPRESSION:  Small left pneumothorax.  < end of copied text >      ADVANCE DIRECTIVES: full code

## 2021-04-08 NOTE — PROGRESS NOTE ADULT - PROBLEM SELECTOR PLAN 1
2/2 shock due to covid-19 PNA; comorbid L pneumothorax. CXR indicates similar patchy infiltrates with small left pneumothorax occupying approximately 10% of the left chest. Patient remains sedated/intubated (ETT 3/29); on pressor, prednisone, lasix, HCTZ. Plan for pigtail. High risk of mortality. At this time, patient's brother/Idalgo is identified surrogate. Full code. 2/2 shock due to covid-19 PNA; comorbid L pneumothorax. CXR indicates similar patchy infiltrates with small left pneumothorax occupying approximately 10% of the left chest. Patient remains sedated/intubated (ETT 3/29); on pressor, prednisone, lasix, HCTZ. Plan for L chest tube. High risk of mortality. At this time, patient's brother/Idalgo is identified surrogate. Full code.

## 2021-04-08 NOTE — PROGRESS NOTE ADULT - PROBLEM SELECTOR PLAN 4
2/2 covid-19. CXR indicates similar patchy infiltrates with small left pneumothorax occupying approximately 10% of the left chest. Patient remains sedated/intubated (ETT 3/29); on pressor, prednisone. High risk of mortality. Full code.

## 2021-04-08 NOTE — PROGRESS NOTE ADULT - SUBJECTIVE AND OBJECTIVE BOX
Patient is a 55y old  Male who presents with a chief complaint of SOB (08 Apr 2021 06:40)  Patient is sedated, intubated, laying in bed in NAD. FIO2 60% saturating 91 to 92%  INTERVAL HPI/OVERNIGHT EVENTS:      VITAL SIGNS:  T(F): 98.1 (04-08-21 @ 05:30)  HR: 104 (04-08-21 @ 10:15)  BP: 125/63 (04-08-21 @ 10:00)  RR: 27 (04-08-21 @ 10:15)  SpO2: 89% (04-08-21 @ 10:15)  Wt(kg): --  I&O's Detail    07 Apr 2021 07:01  -  08 Apr 2021 07:00  --------------------------------------------------------  IN:    Enteral Tube Flush: 100 mL    FentaNYL: 1008 mL    Glucerna 1.5: 575 mL    Midazolam: 281.3 mL    Phenylephrine: 31.5 mL    Propofol: 178.8 mL  Total IN: 2174.6 mL    OUT:    Indwelling Catheter - Urethral (mL): 4190 mL    Nasogastric/Oral tube (mL): 100 mL  Total OUT: 4290 mL    Total NET: -2115.4 mL      08 Apr 2021 07:01  -  08 Apr 2021 11:16  --------------------------------------------------------  IN:    FentaNYL: 126 mL    Glucerna 1.5: 75 mL    Midazolam: 25.2 mL    Phenylephrine: 3.2 mL    Propofol: 22.8 mL  Total IN: 252.2 mL    OUT:    Indwelling Catheter - Urethral (mL): 375 mL  Total OUT: 375 mL    Total NET: -122.8 mL        Mode: AC/ CMV (Assist Control/ Continuous Mandatory Ventilation)  RR (machine): 30  FiO2: 60  PEEP: 10  ITime: 1  MAP: 17  PC: 18  PIP: 29        REVIEW OF SYSTEMS:    CONSTITUTIONAL:  No fevers, chills, sweats    HEENT:  Eyes:  No diplopia or blurred vision. ENT:  No earache, sore throat or runny nose.    CARDIOVASCULAR:  No pressure, squeezing, tightness, or heaviness about the chest; no palpitations.    RESPIRATORY:  Per HPI    GASTROINTESTINAL:  No abdominal pain, nausea, vomiting or diarrhea.    GENITOURINARY:  No dysuria, frequency or urgency.    NEUROLOGIC:  No paresthesias, fasciculations, seizures or weakness.    PSYCHIATRIC:  No disorder of thought or mood.      PHYSICAL EXAM:    Constitutional: Well developed and nourished  Eyes:Perrla  ENMT: normal  Neck:supple  Respiratory: good air entry  Cardiovascular: S1 S2 regular  Gastrointestinal: Soft, Non tender  Extremities: No edema  Vascular:normal  Neurological:Awake, alert,Ox3  Musculoskeletal:Normal      MEDICATIONS  (STANDING):  ALBUTerol    90 MICROgram(s) HFA Inhaler 2 Puff(s) Inhalation every 6 hours  ascorbic acid 1000 milliGRAM(s) Oral daily  aspirin  chewable 81 milliGRAM(s) Oral daily  bisacodyl Suppository 10 milliGRAM(s) Rectal daily  chlorhexidine 0.12% Liquid 15 milliLiter(s) Oral Mucosa two times a day  chlorhexidine 2% Cloths 1 Application(s) Topical <User Schedule>  enoxaparin Injectable 40 milliGRAM(s) SubCutaneous two times a day  fentaNYL   Infusion. 5 MICROgram(s)/kG/Hr (41.9 mL/Hr) IV Continuous <Continuous>  furosemide   Injectable 40 milliGRAM(s) IV Push daily  hydrochlorothiazide 50 milliGRAM(s) Oral daily  insulin lispro (ADMELOG) corrective regimen sliding scale   SubCutaneous every 6 hours  LORazepam     Tablet 2 milliGRAM(s) Oral two times a day  midazolam Infusion 0.02 mG/kG/Hr (1.68 mL/Hr) IV Continuous <Continuous>  mineral oil enema 133 milliLiter(s) Rectal daily  pantoprazole   Suspension 40 milliGRAM(s) Oral daily  phenylephrine    Infusion 0.5 MICROgram(s)/kG/Min (7.86 mL/Hr) IV Continuous <Continuous>  polyethylene glycol 3350 17 Gram(s) Oral daily  potassium phosphate IVPB 30 milliMole(s) IV Intermittent once  predniSONE   Tablet 30 milliGRAM(s) Oral daily  propofol Infusion 30.007 MICROgram(s)/kG/Min (15.1 mL/Hr) IV Continuous <Continuous>  senna 2 Tablet(s) Oral at bedtime    MEDICATIONS  (PRN):  acetaminophen    Suspension .. 650 milliGRAM(s) Oral every 6 hours PRN Temp greater or equal to 38C (100.4F)  sodium chloride 0.9% lock flush 10 milliLiter(s) IV Push every 1 hour PRN Pre/post blood products, medications, blood draw, and to maintain line patency      Allergies    No Known Allergies    Intolerances        LABS:                        9.1    10.29 )-----------( 146      ( 08 Apr 2021 04:13 )             29.0     04-08    145  |  100  |  24<H>  ----------------------------<  140<H>  3.4<L>   |  >45<HH>  |  0.35<L>    Ca    7.6<L>      08 Apr 2021 04:13  Phos  1.9     04-08  Mg     2.1     04-08    TPro  4.5<L>  /  Alb  1.3<L>  /  TBili  0.4  /  DBili  x   /  AST  30  /  ALT  48  /  AlkPhos  84  04-08        ABG - ( 08 Apr 2021 04:02 )  pH, Arterial: 7.41  pH, Blood: x     /  pCO2: 80    /  pO2: 88    / HCO3: 51    / Base Excess: 21.2  /  SaO2: 98                    CAPILLARY BLOOD GLUCOSE      POCT Blood Glucose.: 137 mg/dL (08 Apr 2021 10:12)  POCT Blood Glucose.: 140 mg/dL (08 Apr 2021 06:09)  POCT Blood Glucose.: 146 mg/dL (08 Apr 2021 01:43)  POCT Blood Glucose.: 122 mg/dL (07 Apr 2021 17:01)    pro-bnp -- 04-06 @ 05:03     d-dimer 363  04-06 @ 05:03  pro-bnp -- 04-03 @ 03:48     d-dimer 1250  04-03 @ 03:48      RADIOLOGY & ADDITIONAL TESTS:    CXR:    Ct scan chest:    ekg;    echo:

## 2021-04-09 LAB
ALBUMIN SERPL ELPH-MCNC: 1.3 G/DL — LOW (ref 3.5–5)
ALP SERPL-CCNC: 94 U/L — SIGNIFICANT CHANGE UP (ref 40–120)
ALT FLD-CCNC: 49 U/L DA — SIGNIFICANT CHANGE UP (ref 10–60)
ANION GAP SERPL CALC-SCNC: 3 MMOL/L — LOW (ref 5–17)
AST SERPL-CCNC: 36 U/L — SIGNIFICANT CHANGE UP (ref 10–40)
BASE EXCESS BLDA CALC-SCNC: 25.3 MMOL/L — HIGH (ref -2–3)
BASOPHILS # BLD AUTO: 0.02 K/UL — SIGNIFICANT CHANGE UP (ref 0–0.2)
BASOPHILS NFR BLD AUTO: 0.2 % — SIGNIFICANT CHANGE UP (ref 0–2)
BILIRUB SERPL-MCNC: 0.3 MG/DL — SIGNIFICANT CHANGE UP (ref 0.2–1.2)
BLOOD GAS COMMENTS ARTERIAL: SIGNIFICANT CHANGE UP
BUN SERPL-MCNC: 24 MG/DL — HIGH (ref 7–18)
BUN SERPL-MCNC: 25 MG/DL — HIGH (ref 7–18)
CALCIUM SERPL-MCNC: 7.6 MG/DL — LOW (ref 8.4–10.5)
CALCIUM SERPL-MCNC: 7.6 MG/DL — LOW (ref 8.4–10.5)
CHLORIDE SERPL-SCNC: 93 MMOL/L — LOW (ref 96–108)
CHLORIDE SERPL-SCNC: 97 MMOL/L — SIGNIFICANT CHANGE UP (ref 96–108)
CO2 SERPL-SCNC: 43 MMOL/L — HIGH (ref 22–31)
CREAT SERPL-MCNC: 0.36 MG/DL — LOW (ref 0.5–1.3)
CREAT SERPL-MCNC: 0.41 MG/DL — LOW (ref 0.5–1.3)
CRP SERPL-MCNC: 73 MG/L — HIGH
D DIMER BLD IA.RAPID-MCNC: 507 NG/ML DDU — HIGH
EOSINOPHIL # BLD AUTO: 0.13 K/UL — SIGNIFICANT CHANGE UP (ref 0–0.5)
EOSINOPHIL NFR BLD AUTO: 1.5 % — SIGNIFICANT CHANGE UP (ref 0–6)
FERRITIN SERPL-MCNC: 978 NG/ML — HIGH (ref 30–400)
GLUCOSE BLDC GLUCOMTR-MCNC: 101 MG/DL — HIGH (ref 70–99)
GLUCOSE BLDC GLUCOMTR-MCNC: 102 MG/DL — HIGH (ref 70–99)
GLUCOSE BLDC GLUCOMTR-MCNC: 125 MG/DL — HIGH (ref 70–99)
GLUCOSE BLDC GLUCOMTR-MCNC: 130 MG/DL — HIGH (ref 70–99)
GLUCOSE BLDC GLUCOMTR-MCNC: 50 MG/DL — CRITICAL LOW (ref 70–99)
GLUCOSE SERPL-MCNC: 93 MG/DL — SIGNIFICANT CHANGE UP (ref 70–99)
GLUCOSE SERPL-MCNC: 95 MG/DL — SIGNIFICANT CHANGE UP (ref 70–99)
HCO3 BLDA-SCNC: 55 MMOL/L — CRITICAL HIGH (ref 21–28)
HCT VFR BLD CALC: 30.2 % — LOW (ref 39–50)
HGB BLD-MCNC: 9.5 G/DL — LOW (ref 13–17)
HOROWITZ INDEX BLDA+IHG-RTO: 70 — SIGNIFICANT CHANGE UP
IMM GRANULOCYTES NFR BLD AUTO: 3.2 % — HIGH (ref 0–1.5)
LYMPHOCYTES # BLD AUTO: 1.13 K/UL — SIGNIFICANT CHANGE UP (ref 1–3.3)
LYMPHOCYTES # BLD AUTO: 12.6 % — LOW (ref 13–44)
MAGNESIUM SERPL-MCNC: 2.1 MG/DL — SIGNIFICANT CHANGE UP (ref 1.6–2.6)
MAGNESIUM SERPL-MCNC: 2.2 MG/DL — SIGNIFICANT CHANGE UP (ref 1.6–2.6)
MCHC RBC-ENTMCNC: 31.5 GM/DL — LOW (ref 32–36)
MCHC RBC-ENTMCNC: 32.1 PG — SIGNIFICANT CHANGE UP (ref 27–34)
MCV RBC AUTO: 102 FL — HIGH (ref 80–100)
MONOCYTES # BLD AUTO: 0.13 K/UL — SIGNIFICANT CHANGE UP (ref 0–0.9)
MONOCYTES NFR BLD AUTO: 1.5 % — LOW (ref 2–14)
NEUTROPHILS # BLD AUTO: 7.24 K/UL — SIGNIFICANT CHANGE UP (ref 1.8–7.4)
NEUTROPHILS NFR BLD AUTO: 81 % — HIGH (ref 43–77)
NRBC # BLD: 0 /100 WBCS — SIGNIFICANT CHANGE UP (ref 0–0)
PCO2 BLDA: 81 MMHG — CRITICAL HIGH (ref 35–48)
PH BLDA: 7.44 — SIGNIFICANT CHANGE UP (ref 7.35–7.45)
PHOSPHATE SERPL-MCNC: 1.7 MG/DL — LOW (ref 2.5–4.5)
PHOSPHATE SERPL-MCNC: 3.3 MG/DL — SIGNIFICANT CHANGE UP (ref 2.5–4.5)
PLATELET # BLD AUTO: 182 K/UL — SIGNIFICANT CHANGE UP (ref 150–400)
PO2 BLDA: 71 MMHG — LOW (ref 83–108)
POTASSIUM SERPL-MCNC: 3.4 MMOL/L — LOW (ref 3.5–5.3)
POTASSIUM SERPL-MCNC: 3.9 MMOL/L — SIGNIFICANT CHANGE UP (ref 3.5–5.3)
POTASSIUM SERPL-SCNC: 3.4 MMOL/L — LOW (ref 3.5–5.3)
POTASSIUM SERPL-SCNC: 3.9 MMOL/L — SIGNIFICANT CHANGE UP (ref 3.5–5.3)
PROCALCITONIN SERPL-MCNC: 0.51 NG/ML — HIGH (ref 0.02–0.1)
PROT SERPL-MCNC: 4.7 G/DL — LOW (ref 6–8.3)
RBC # BLD: 2.96 M/UL — LOW (ref 4.2–5.8)
RBC # FLD: 12.8 % — SIGNIFICANT CHANGE UP (ref 10.3–14.5)
SAO2 % BLDA: 98 % — SIGNIFICANT CHANGE UP
SODIUM SERPL-SCNC: 139 MMOL/L — SIGNIFICANT CHANGE UP (ref 135–145)
SODIUM SERPL-SCNC: 143 MMOL/L — SIGNIFICANT CHANGE UP (ref 135–145)
WBC # BLD: 8.94 K/UL — SIGNIFICANT CHANGE UP (ref 3.8–10.5)
WBC # FLD AUTO: 8.94 K/UL — SIGNIFICANT CHANGE UP (ref 3.8–10.5)

## 2021-04-09 PROCEDURE — 99231 SBSQ HOSP IP/OBS SF/LOW 25: CPT

## 2021-04-09 PROCEDURE — 99233 SBSQ HOSP IP/OBS HIGH 50: CPT

## 2021-04-09 PROCEDURE — 71045 X-RAY EXAM CHEST 1 VIEW: CPT | Mod: 26

## 2021-04-09 RX ORDER — ALBUMIN HUMAN 25 %
50 VIAL (ML) INTRAVENOUS EVERY 6 HOURS
Refills: 0 | Status: DISCONTINUED | OUTPATIENT
Start: 2021-04-09 | End: 2021-04-09

## 2021-04-09 RX ORDER — POTASSIUM CHLORIDE 20 MEQ
20 PACKET (EA) ORAL ONCE
Refills: 0 | Status: COMPLETED | OUTPATIENT
Start: 2021-04-09 | End: 2021-04-09

## 2021-04-09 RX ORDER — POTASSIUM CHLORIDE 20 MEQ
10 PACKET (EA) ORAL
Refills: 0 | Status: COMPLETED | OUTPATIENT
Start: 2021-04-09 | End: 2021-04-09

## 2021-04-09 RX ORDER — POTASSIUM PHOSPHATE, MONOBASIC POTASSIUM PHOSPHATE, DIBASIC 236; 224 MG/ML; MG/ML
30 INJECTION, SOLUTION INTRAVENOUS ONCE
Refills: 0 | Status: COMPLETED | OUTPATIENT
Start: 2021-04-09 | End: 2021-04-09

## 2021-04-09 RX ORDER — DEXMEDETOMIDINE HYDROCHLORIDE IN 0.9% SODIUM CHLORIDE 4 UG/ML
0.2 INJECTION INTRAVENOUS
Qty: 400 | Refills: 0 | Status: DISCONTINUED | OUTPATIENT
Start: 2021-04-09 | End: 2021-04-10

## 2021-04-09 RX ORDER — ALBUMIN HUMAN 25 %
50 VIAL (ML) INTRAVENOUS EVERY 6 HOURS
Refills: 0 | Status: COMPLETED | OUTPATIENT
Start: 2021-04-09 | End: 2021-04-11

## 2021-04-09 RX ORDER — FUROSEMIDE 40 MG
40 TABLET ORAL
Refills: 0 | Status: DISCONTINUED | OUTPATIENT
Start: 2021-04-09 | End: 2021-04-11

## 2021-04-09 RX ADMIN — ALBUTEROL 2 PUFF(S): 90 AEROSOL, METERED ORAL at 03:49

## 2021-04-09 RX ADMIN — POTASSIUM PHOSPHATE, MONOBASIC POTASSIUM PHOSPHATE, DIBASIC 62.5 MILLIMOLE(S): 236; 224 INJECTION, SOLUTION INTRAVENOUS at 11:25

## 2021-04-09 RX ADMIN — Medication 2 MILLIGRAM(S): at 05:41

## 2021-04-09 RX ADMIN — Medication 30 MILLIGRAM(S): at 05:32

## 2021-04-09 RX ADMIN — LACTULOSE 20 GRAM(S): 10 SOLUTION ORAL at 05:34

## 2021-04-09 RX ADMIN — POLYETHYLENE GLYCOL 3350 17 GRAM(S): 17 POWDER, FOR SOLUTION ORAL at 11:21

## 2021-04-09 RX ADMIN — Medication 50 MILLILITER(S): at 17:28

## 2021-04-09 RX ADMIN — PANTOPRAZOLE SODIUM 40 MILLIGRAM(S): 20 TABLET, DELAYED RELEASE ORAL at 11:20

## 2021-04-09 RX ADMIN — ALBUTEROL 2 PUFF(S): 90 AEROSOL, METERED ORAL at 10:56

## 2021-04-09 RX ADMIN — Medication 50 MILLIEQUIVALENT(S): at 19:43

## 2021-04-09 RX ADMIN — Medication 10 MILLIGRAM(S): at 11:22

## 2021-04-09 RX ADMIN — DEXMEDETOMIDINE HYDROCHLORIDE IN 0.9% SODIUM CHLORIDE 4.19 MICROGRAM(S)/KG/HR: 4 INJECTION INTRAVENOUS at 23:23

## 2021-04-09 RX ADMIN — MIDAZOLAM HYDROCHLORIDE 1.68 MG/KG/HR: 1 INJECTION, SOLUTION INTRAMUSCULAR; INTRAVENOUS at 08:43

## 2021-04-09 RX ADMIN — Medication 100 MILLIEQUIVALENT(S): at 10:00

## 2021-04-09 RX ADMIN — Medication 100 MILLIEQUIVALENT(S): at 11:20

## 2021-04-09 RX ADMIN — CHLORHEXIDINE GLUCONATE 1 APPLICATION(S): 213 SOLUTION TOPICAL at 05:32

## 2021-04-09 RX ADMIN — FENTANYL CITRATE 41.9 MICROGRAM(S)/KG/HR: 50 INJECTION INTRAVENOUS at 00:04

## 2021-04-09 RX ADMIN — DEXMEDETOMIDINE HYDROCHLORIDE IN 0.9% SODIUM CHLORIDE 4.19 MICROGRAM(S)/KG/HR: 4 INJECTION INTRAVENOUS at 10:00

## 2021-04-09 RX ADMIN — Medication 50 MILLIGRAM(S): at 05:32

## 2021-04-09 RX ADMIN — FENTANYL CITRATE 41.9 MICROGRAM(S)/KG/HR: 50 INJECTION INTRAVENOUS at 23:24

## 2021-04-09 RX ADMIN — FENTANYL CITRATE 41.9 MICROGRAM(S)/KG/HR: 50 INJECTION INTRAVENOUS at 06:11

## 2021-04-09 RX ADMIN — MIDAZOLAM HYDROCHLORIDE 1.68 MG/KG/HR: 1 INJECTION, SOLUTION INTRAMUSCULAR; INTRAVENOUS at 23:23

## 2021-04-09 RX ADMIN — PROPOFOL 15.1 MICROGRAM(S)/KG/MIN: 10 INJECTION, EMULSION INTRAVENOUS at 08:44

## 2021-04-09 RX ADMIN — DEXMEDETOMIDINE HYDROCHLORIDE IN 0.9% SODIUM CHLORIDE 4.19 MICROGRAM(S)/KG/HR: 4 INJECTION INTRAVENOUS at 15:40

## 2021-04-09 RX ADMIN — Medication 40 MILLIGRAM(S): at 17:27

## 2021-04-09 RX ADMIN — LACTULOSE 20 GRAM(S): 10 SOLUTION ORAL at 17:30

## 2021-04-09 RX ADMIN — FENTANYL CITRATE 41.9 MICROGRAM(S)/KG/HR: 50 INJECTION INTRAVENOUS at 12:00

## 2021-04-09 RX ADMIN — ALBUTEROL 2 PUFF(S): 90 AEROSOL, METERED ORAL at 15:05

## 2021-04-09 RX ADMIN — ALBUTEROL 2 PUFF(S): 90 AEROSOL, METERED ORAL at 21:11

## 2021-04-09 RX ADMIN — Medication 1000 MILLIGRAM(S): at 11:20

## 2021-04-09 RX ADMIN — Medication 100 MILLIEQUIVALENT(S): at 12:31

## 2021-04-09 RX ADMIN — Medication 133 MILLILITER(S): at 12:31

## 2021-04-09 RX ADMIN — Medication 81 MILLIGRAM(S): at 11:20

## 2021-04-09 RX ADMIN — Medication 40 MILLIGRAM(S): at 05:31

## 2021-04-09 RX ADMIN — Medication 50 MILLILITER(S): at 12:31

## 2021-04-09 RX ADMIN — Medication 2 MILLIGRAM(S): at 17:27

## 2021-04-09 RX ADMIN — SENNA PLUS 2 TABLET(S): 8.6 TABLET ORAL at 21:19

## 2021-04-09 RX ADMIN — Medication 50 MILLILITER(S): at 23:12

## 2021-04-09 RX ADMIN — Medication 50 MILLILITER(S): at 11:18

## 2021-04-09 NOTE — PROGRESS NOTE ADULT - ATTENDING COMMENTS
55 yr old  man , non smoker with  moody 1990s presented 3/14 with x9 days worsening cough, subjective fevers, and SOB, with x2-3 days dysuria and central, non-radiating, constant CP. Admitted to medicine unit  for acute hypoxic respiratory failure secondary to pna from covid-19 infection .     Assessment:  1. Acute hypoxic respiratory failure  2 Covid-19 infection   3. Transaminitis  4. Prediabetes  5. Bilateral pneumothorax    Plan   -pat intubated 3/29   -Mechanical ventilation with lung protective strategy   -adjust vent as per ABG  -PTX  improved post chest tube placement  -diuresis and aim for negative fluid balance  -Start precedex and taper versed off   -Cont. fentanyl  -isolation : contact and air borne   -monitor biomarkers daily, trending down   -trial of semi- s/p pulse steroid .. solumedrol 250 bid x 3/3 days   -Tube feedings, bowel regimen  -dvt/gi prophy  -not a candidate for remdesivir due to covid-19 antibodies and elevated LFT  -not a candidate for Tociluzimab due to elevated LFT  -hemodynamic monitoring   -NGT  - May need trach/peg if unable to extubate this weekend

## 2021-04-09 NOTE — PROGRESS NOTE ADULT - SUBJECTIVE AND OBJECTIVE BOX
OVERNIGHT EVENTS: no overnight events    Present Symptoms:      Review of Systems:  Unable to obtain due to poor mentation     MEDICATIONS  (STANDING):  albumin human 25% IVPB 50 milliLiter(s) IV Intermittent every 6 hours  ALBUTerol    90 MICROgram(s) HFA Inhaler 2 Puff(s) Inhalation every 6 hours  ascorbic acid 1000 milliGRAM(s) Oral daily  aspirin  chewable 81 milliGRAM(s) Oral daily  bisacodyl Suppository 10 milliGRAM(s) Rectal daily  chlorhexidine 2% Cloths 1 Application(s) Topical <User Schedule>  dexMEDEtomidine Infusion 0.2 MICROgram(s)/kG/Hr (4.19 mL/Hr) IV Continuous <Continuous>  fentaNYL   Infusion. 5 MICROgram(s)/kG/Hr (41.9 mL/Hr) IV Continuous <Continuous>  furosemide   Injectable 40 milliGRAM(s) IV Push daily  furosemide   Injectable 40 milliGRAM(s) IV Push two times a day  hydrochlorothiazide 50 milliGRAM(s) Oral daily  insulin lispro (ADMELOG) corrective regimen sliding scale   SubCutaneous every 6 hours  lactulose Syrup 20 Gram(s) Oral two times a day  LORazepam     Tablet 2 milliGRAM(s) Oral two times a day  midazolam Infusion 0.02 mG/kG/Hr (1.68 mL/Hr) IV Continuous <Continuous>  mineral oil enema 133 milliLiter(s) Rectal daily  pantoprazole   Suspension 40 milliGRAM(s) Oral daily  phenylephrine    Infusion 0.5 MICROgram(s)/kG/Min (7.86 mL/Hr) IV Continuous <Continuous>  polyethylene glycol 3350 17 Gram(s) Oral daily  predniSONE   Tablet 30 milliGRAM(s) Oral daily  senna 2 Tablet(s) Oral at bedtime    MEDICATIONS  (PRN):  acetaminophen    Suspension .. 650 milliGRAM(s) Oral every 6 hours PRN Temp greater or equal to 38C (100.4F)  sodium chloride 0.9% lock flush 10 milliLiter(s) IV Push every 1 hour PRN Pre/post blood products, medications, blood draw, and to maintain line patency      PHYSICAL EXAM:  Vital Signs Last 24 Hrs  T(C): 36.6 (09 Apr 2021 04:00), Max: 37.8 (09 Apr 2021 00:00)  T(F): 97.9 (09 Apr 2021 04:00), Max: 100.1 (09 Apr 2021 00:00)  HR: 85 (09 Apr 2021 13:00) (85 - 114)  BP: 95/57 (09 Apr 2021 13:00) (95/57 - 152/78)  BP(mean): 67 (09 Apr 2021 13:00) (65 - 95)  RR: 30 (09 Apr 2021 13:00) (12 - 32)  SpO2: 88% (09 Apr 2021 13:00) (88% - 100%)     Karnofsky Performance Score/Palliative Performance Status Version2: 20   %  General: intubated, NAD  Not fully examined due to COVID status    LABS:                          9.5    8.94  )-----------( 182      ( 09 Apr 2021 04:38 )             30.2     04-09    143  |  97  |  25<H>  ----------------------------<  93  3.4<L>   |  43<H>  |  0.36<L>    Ca    7.6<L>      09 Apr 2021 04:38  Phos  1.7     04-09  Mg     2.2     04-09    TPro  4.7<L>  /  Alb  1.3<L>  /  TBili  0.3  /  DBili  x   /  AST  36  /  ALT  49  /  AlkPhos  94  04-09        RADIOLOGY & ADDITIONAL STUDIES:    ADVANCE DIRECTIVES:

## 2021-04-09 NOTE — PROGRESS NOTE ADULT - PROBLEM SELECTOR PLAN 1
2/2 shock due to covid-19 PNA; comorbid L pneumothorax.   Patient remains sedated/intubated (ETT 3/29); on pressor, prednisone, lasix, HCTZ. s/p  L chest tube 4/8. High risk of mortality. At this time, patient's brother/Idalgo is identified surrogate. Full code.

## 2021-04-09 NOTE — PROGRESS NOTE ADULT - SUBJECTIVE AND OBJECTIVE BOX
INTERVAL HPI/OVERNIGHT EVENTS: Seen at bedside. no overnight event. on Vent    PRESSORS: [ ] YES [x ] NO  WHICH:        Antimicrobial:    Cardiovascular:  furosemide   Injectable 40 milliGRAM(s) IV Push daily  furosemide   Injectable 40 milliGRAM(s) IV Push two times a day  hydrochlorothiazide 50 milliGRAM(s) Oral daily  phenylephrine    Infusion 0.5 MICROgram(s)/kG/Min IV Continuous <Continuous>    Pulmonary:  ALBUTerol    90 MICROgram(s) HFA Inhaler 2 Puff(s) Inhalation every 6 hours    Hematalogic:  aspirin  chewable 81 milliGRAM(s) Oral daily    Other:  acetaminophen    Suspension .. 650 milliGRAM(s) Oral every 6 hours PRN  albumin human 25% IVPB 50 milliLiter(s) IV Intermittent every 6 hours  ascorbic acid 1000 milliGRAM(s) Oral daily  bisacodyl Suppository 10 milliGRAM(s) Rectal daily  chlorhexidine 2% Cloths 1 Application(s) Topical <User Schedule>  dexMEDEtomidine Infusion 0.2 MICROgram(s)/kG/Hr IV Continuous <Continuous>  fentaNYL   Infusion. 5 MICROgram(s)/kG/Hr IV Continuous <Continuous>  insulin lispro (ADMELOG) corrective regimen sliding scale   SubCutaneous every 6 hours  lactulose Syrup 20 Gram(s) Oral two times a day  LORazepam     Tablet 2 milliGRAM(s) Oral two times a day  midazolam Infusion 0.02 mG/kG/Hr IV Continuous <Continuous>  mineral oil enema 133 milliLiter(s) Rectal daily  pantoprazole   Suspension 40 milliGRAM(s) Oral daily  polyethylene glycol 3350 17 Gram(s) Oral daily  potassium chloride  10 mEq/100 mL IVPB 10 milliEquivalent(s) IV Intermittent every 1 hour  predniSONE   Tablet 30 milliGRAM(s) Oral daily  senna 2 Tablet(s) Oral at bedtime  sodium chloride 0.9% lock flush 10 milliLiter(s) IV Push every 1 hour PRN    acetaminophen    Suspension .. 650 milliGRAM(s) Oral every 6 hours PRN  albumin human 25% IVPB 50 milliLiter(s) IV Intermittent every 6 hours  ALBUTerol    90 MICROgram(s) HFA Inhaler 2 Puff(s) Inhalation every 6 hours  ascorbic acid 1000 milliGRAM(s) Oral daily  aspirin  chewable 81 milliGRAM(s) Oral daily  bisacodyl Suppository 10 milliGRAM(s) Rectal daily  chlorhexidine 2% Cloths 1 Application(s) Topical <User Schedule>  dexMEDEtomidine Infusion 0.2 MICROgram(s)/kG/Hr IV Continuous <Continuous>  fentaNYL   Infusion. 5 MICROgram(s)/kG/Hr IV Continuous <Continuous>  furosemide   Injectable 40 milliGRAM(s) IV Push daily  furosemide   Injectable 40 milliGRAM(s) IV Push two times a day  hydrochlorothiazide 50 milliGRAM(s) Oral daily  insulin lispro (ADMELOG) corrective regimen sliding scale   SubCutaneous every 6 hours  lactulose Syrup 20 Gram(s) Oral two times a day  LORazepam     Tablet 2 milliGRAM(s) Oral two times a day  midazolam Infusion 0.02 mG/kG/Hr IV Continuous <Continuous>  mineral oil enema 133 milliLiter(s) Rectal daily  pantoprazole   Suspension 40 milliGRAM(s) Oral daily  phenylephrine    Infusion 0.5 MICROgram(s)/kG/Min IV Continuous <Continuous>  polyethylene glycol 3350 17 Gram(s) Oral daily  potassium chloride  10 mEq/100 mL IVPB 10 milliEquivalent(s) IV Intermittent every 1 hour  predniSONE   Tablet 30 milliGRAM(s) Oral daily  senna 2 Tablet(s) Oral at bedtime  sodium chloride 0.9% lock flush 10 milliLiter(s) IV Push every 1 hour PRN    Drug Dosing Weight  Height (cm): 167.6 (14 Mar 2021 12:03)  Weight (kg): 83.869 (14 Mar 2021 12:03)  BMI (kg/m2): 29.9 (14 Mar 2021 12:03)  BSA (m2): 1.93 (14 Mar 2021 12:03)    CENTRAL LINE: [x ] YES [ ] NO  LOCATION:  [facility] DATE INSERTED: 4/6  REMOVE: [ ] YES [ ] NO  EXPLAIN:    RIVERA: [x ] YES [ ] NO    DATE INSERTED:  REMOVE:  [ ] YES [ ] NO  EXPLAIN:    A-LINE:  [x ] YES [ ] NO  LOCATION: right arm  DATE INSERTED: 3/29  REMOVE:  [ ] YES [ ] NO  EXPLAIN:        ICU Vital Signs Last 24 Hrs  T(C): 36.6 (09 Apr 2021 04:00), Max: 37.8 (09 Apr 2021 00:00)  T(F): 97.9 (09 Apr 2021 04:00), Max: 100.1 (09 Apr 2021 00:00)  HR: 101 (09 Apr 2021 11:34) (86 - 114)  BP: 152/76 (09 Apr 2021 09:30) (101/80 - 152/76)  BP(mean): 91 (09 Apr 2021 09:30) (65 - 91)  ABP: 172/87 (09 Apr 2021 09:30) (107/54 - 172/87)  ABP(mean): 116 (09 Apr 2021 09:30) (71 - 116)  RR: 29 (09 Apr 2021 09:30) (15 - 31)  SpO2: 100% (09 Apr 2021 11:34) (84% - 100%)      ABG - ( 09 Apr 2021 04:54 )  pH, Arterial: 7.44  pH, Blood: x     /  pCO2: 81    /  pO2: 71    / HCO3: 55    / Base Excess: 25.3  /  SaO2: 98                    04-08 @ 07:01  -  04-09 @ 07:00  --------------------------------------------------------  IN: 2607.4 mL / OUT: 3260 mL / NET: -652.6 mL        Mode: AC/ CMV (Assist Control/ Continuous Mandatory Ventilation)  RR (machine): 30  FiO2: 70  PEEP: 10  ITime: 0.8  MAP: 17  PC: 18  PIP: 29    ROS   unable to assess, pt sedated  PHYSICAL EXAM:    GENERAL: NAD on vent  HEAD:  Atraumatic, Normocephalic  EYES:  PERRLA, conjunctiva and sclera clear  MOUTH : ETT  NECK: Supple,  No JVD; Normal thyroid; Trachea midline LIJ  NERVOUS SYSTEM:  sedated  CHEST/LUNG: No chest deformity; Normal percussion bilaterally; No rhonchi, wheezing   HEART: Regular rate and rhythm; No murmurs, rubs, or gallops  ABDOMEN: Soft, Nontender, Nondistended; Bowel sounds present  EXTREMITIES:  b/l upper extremities edema +2,  no edema on lower legs. No clubbing, cyanosis   LYMPH: No lymphadenopathy noted  SKIN: No rashes  Good capillary refill      LABS:  CBC Full  -  ( 09 Apr 2021 04:38 )  WBC Count : 8.94 K/uL  RBC Count : 2.96 M/uL  Hemoglobin : 9.5 g/dL  Hematocrit : 30.2 %  Platelet Count - Automated : 182 K/uL  Mean Cell Volume : 102.0 fl  Mean Cell Hemoglobin : 32.1 pg  Mean Cell Hemoglobin Concentration : 31.5 gm/dL  Auto Neutrophil # : 7.24 K/uL  Auto Lymphocyte # : 1.13 K/uL  Auto Monocyte # : 0.13 K/uL  Auto Eosinophil # : 0.13 K/uL  Auto Basophil # : 0.02 K/uL  Auto Neutrophil % : 81.0 %  Auto Lymphocyte % : 12.6 %  Auto Monocyte % : 1.5 %  Auto Eosinophil % : 1.5 %  Auto Basophil % : 0.2 %    04-09    143  |  97  |  25<H>  ----------------------------<  93  3.4<L>   |  43<H>  |  0.36<L>    Ca    7.6<L>      09 Apr 2021 04:38  Phos  1.7     04-09  Mg     2.2     04-09    TPro  4.7<L>  /  Alb  1.3<L>  /  TBili  0.3  /  DBili  x   /  AST  36  /  ALT  49  /  AlkPhos  94  04-09            RADIOLOGY & ADDITIONAL STUDIES REVIEWED:      < from: Xray Chest 1 View- PORTABLE-Routine (Xray Chest 1 View- PORTABLE-Routine in AM.) (04.08.21 @ 07:15) >    EXAM:  XR CHEST PORTABLE ROUTINE 1V                            PROCEDURE DATE:  04/08/2021          INTERPRETATION:  Chest one view    HISTORY: Hypoxemia    COMPARISON STUDY: 4/7/2021    Frontal expiratory view of the chest shows the heart to be normal in size. Endotracheal tube, left jugular line and feeding tube remain present.    The lungs show similar patchy infiltrates with small left pneumothorax occupying approximately 10% of the left chest and there is no evidence of right pneumothorax nor pleural effusion.    IMPRESSION:  Small left pneumothorax.    The above finding was conveyed by telephone to Dr. Gage at 2:30 PM on the day of the examination.    Thank you for the courtesy of this referral.            STEFANO SALAS MD; Attending Interventional Radiologist  This document has been electronically signed. Apr 8 2021  2:33PM    < end of copied text >    [ ]GOALS OF CARE DISCUSSION WITH PATIENT/FAMILY/PROXY:    CRITICAL CARE TIME SPENT: 35 minutes INTERVAL HPI/OVERNIGHT EVENTS: Seen at bedside. Overnight hypoglycemic to 50's.     PRESSORS: [ ] YES [x ] NO  WHICH:        Antimicrobial:    Cardiovascular:  furosemide   Injectable 40 milliGRAM(s) IV Push daily  furosemide   Injectable 40 milliGRAM(s) IV Push two times a day  hydrochlorothiazide 50 milliGRAM(s) Oral daily  phenylephrine    Infusion 0.5 MICROgram(s)/kG/Min IV Continuous <Continuous>    Pulmonary:  ALBUTerol    90 MICROgram(s) HFA Inhaler 2 Puff(s) Inhalation every 6 hours    Hematalogic:  aspirin  chewable 81 milliGRAM(s) Oral daily    Other:  acetaminophen    Suspension .. 650 milliGRAM(s) Oral every 6 hours PRN  albumin human 25% IVPB 50 milliLiter(s) IV Intermittent every 6 hours  ascorbic acid 1000 milliGRAM(s) Oral daily  bisacodyl Suppository 10 milliGRAM(s) Rectal daily  chlorhexidine 2% Cloths 1 Application(s) Topical <User Schedule>  dexMEDEtomidine Infusion 0.2 MICROgram(s)/kG/Hr IV Continuous <Continuous>  fentaNYL   Infusion. 5 MICROgram(s)/kG/Hr IV Continuous <Continuous>  insulin lispro (ADMELOG) corrective regimen sliding scale   SubCutaneous every 6 hours  lactulose Syrup 20 Gram(s) Oral two times a day  LORazepam     Tablet 2 milliGRAM(s) Oral two times a day  midazolam Infusion 0.02 mG/kG/Hr IV Continuous <Continuous>  mineral oil enema 133 milliLiter(s) Rectal daily  pantoprazole   Suspension 40 milliGRAM(s) Oral daily  polyethylene glycol 3350 17 Gram(s) Oral daily  potassium chloride  10 mEq/100 mL IVPB 10 milliEquivalent(s) IV Intermittent every 1 hour  predniSONE   Tablet 30 milliGRAM(s) Oral daily  senna 2 Tablet(s) Oral at bedtime  sodium chloride 0.9% lock flush 10 milliLiter(s) IV Push every 1 hour PRN    acetaminophen    Suspension .. 650 milliGRAM(s) Oral every 6 hours PRN  albumin human 25% IVPB 50 milliLiter(s) IV Intermittent every 6 hours  ALBUTerol    90 MICROgram(s) HFA Inhaler 2 Puff(s) Inhalation every 6 hours  ascorbic acid 1000 milliGRAM(s) Oral daily  aspirin  chewable 81 milliGRAM(s) Oral daily  bisacodyl Suppository 10 milliGRAM(s) Rectal daily  chlorhexidine 2% Cloths 1 Application(s) Topical <User Schedule>  dexMEDEtomidine Infusion 0.2 MICROgram(s)/kG/Hr IV Continuous <Continuous>  fentaNYL   Infusion. 5 MICROgram(s)/kG/Hr IV Continuous <Continuous>  furosemide   Injectable 40 milliGRAM(s) IV Push daily  furosemide   Injectable 40 milliGRAM(s) IV Push two times a day  hydrochlorothiazide 50 milliGRAM(s) Oral daily  insulin lispro (ADMELOG) corrective regimen sliding scale   SubCutaneous every 6 hours  lactulose Syrup 20 Gram(s) Oral two times a day  LORazepam     Tablet 2 milliGRAM(s) Oral two times a day  midazolam Infusion 0.02 mG/kG/Hr IV Continuous <Continuous>  mineral oil enema 133 milliLiter(s) Rectal daily  pantoprazole   Suspension 40 milliGRAM(s) Oral daily  phenylephrine    Infusion 0.5 MICROgram(s)/kG/Min IV Continuous <Continuous>  polyethylene glycol 3350 17 Gram(s) Oral daily  potassium chloride  10 mEq/100 mL IVPB 10 milliEquivalent(s) IV Intermittent every 1 hour  predniSONE   Tablet 30 milliGRAM(s) Oral daily  senna 2 Tablet(s) Oral at bedtime  sodium chloride 0.9% lock flush 10 milliLiter(s) IV Push every 1 hour PRN    Drug Dosing Weight  Height (cm): 167.6 (14 Mar 2021 12:03)  Weight (kg): 83.869 (14 Mar 2021 12:03)  BMI (kg/m2): 29.9 (14 Mar 2021 12:03)  BSA (m2): 1.93 (14 Mar 2021 12:03)    CENTRAL LINE: [x ] YES [ ] NO  LOCATION:  [facility] DATE INSERTED: 4/6  REMOVE: [ ] YES [ ] NO  EXPLAIN:    RIVERA: [x ] YES [ ] NO    DATE INSERTED:  REMOVE:  [ ] YES [ ] NO  EXPLAIN:    A-LINE:  [x ] YES [ ] NO  LOCATION: right arm  DATE INSERTED: 3/29  REMOVE:  [ ] YES [ ] NO  EXPLAIN:        ICU Vital Signs Last 24 Hrs  T(C): 36.6 (09 Apr 2021 04:00), Max: 37.8 (09 Apr 2021 00:00)  T(F): 97.9 (09 Apr 2021 04:00), Max: 100.1 (09 Apr 2021 00:00)  HR: 101 (09 Apr 2021 11:34) (86 - 114)  BP: 152/76 (09 Apr 2021 09:30) (101/80 - 152/76)  BP(mean): 91 (09 Apr 2021 09:30) (65 - 91)  ABP: 172/87 (09 Apr 2021 09:30) (107/54 - 172/87)  ABP(mean): 116 (09 Apr 2021 09:30) (71 - 116)  RR: 29 (09 Apr 2021 09:30) (15 - 31)  SpO2: 100% (09 Apr 2021 11:34) (84% - 100%)      ABG - ( 09 Apr 2021 04:54 )  pH, Arterial: 7.44  pH, Blood: x     /  pCO2: 81    /  pO2: 71    / HCO3: 55    / Base Excess: 25.3  /  SaO2: 98                    04-08 @ 07:01  -  04-09 @ 07:00  --------------------------------------------------------  IN: 2607.4 mL / OUT: 3260 mL / NET: -652.6 mL        Mode: AC/ CMV (Assist Control/ Continuous Mandatory Ventilation)  RR (machine): 30  FiO2: 70  PEEP: 10  ITime: 0.8  MAP: 17  PC: 18  PIP: 29    ROS   unable to assess, pt sedated  PHYSICAL EXAM:    GENERAL: NAD on vent  HEAD:  Atraumatic, Normocephalic  EYES:  PERRLA, conjunctiva and sclera clear  MOUTH : ETT  NECK: Supple,  No JVD; Normal thyroid; Trachea midline LIJ  NERVOUS SYSTEM:  sedated  CHEST/LUNG: No chest deformity; Normal percussion bilaterally; No rhonchi, wheezing   HEART: Regular rate and rhythm; No murmurs, rubs, or gallops  ABDOMEN: Soft, Nontender, Nondistended; Bowel sounds present  EXTREMITIES:  b/l upper extremities edema +2,  no edema on lower legs. No clubbing, cyanosis   LYMPH: No lymphadenopathy noted  SKIN: No rashes  Good capillary refill      LABS:  CBC Full  -  ( 09 Apr 2021 04:38 )  WBC Count : 8.94 K/uL  RBC Count : 2.96 M/uL  Hemoglobin : 9.5 g/dL  Hematocrit : 30.2 %  Platelet Count - Automated : 182 K/uL  Mean Cell Volume : 102.0 fl  Mean Cell Hemoglobin : 32.1 pg  Mean Cell Hemoglobin Concentration : 31.5 gm/dL  Auto Neutrophil # : 7.24 K/uL  Auto Lymphocyte # : 1.13 K/uL  Auto Monocyte # : 0.13 K/uL  Auto Eosinophil # : 0.13 K/uL  Auto Basophil # : 0.02 K/uL  Auto Neutrophil % : 81.0 %  Auto Lymphocyte % : 12.6 %  Auto Monocyte % : 1.5 %  Auto Eosinophil % : 1.5 %  Auto Basophil % : 0.2 %    04-09    143  |  97  |  25<H>  ----------------------------<  93  3.4<L>   |  43<H>  |  0.36<L>    Ca    7.6<L>      09 Apr 2021 04:38  Phos  1.7     04-09  Mg     2.2     04-09    TPro  4.7<L>  /  Alb  1.3<L>  /  TBili  0.3  /  DBili  x   /  AST  36  /  ALT  49  /  AlkPhos  94  04-09            RADIOLOGY & ADDITIONAL STUDIES REVIEWED:      < from: Xray Chest 1 View- PORTABLE-Routine (Xray Chest 1 View- PORTABLE-Routine in AM.) (04.08.21 @ 07:15) >    EXAM:  XR CHEST PORTABLE ROUTINE 1V                            PROCEDURE DATE:  04/08/2021          INTERPRETATION:  Chest one view    HISTORY: Hypoxemia    COMPARISON STUDY: 4/7/2021    Frontal expiratory view of the chest shows the heart to be normal in size. Endotracheal tube, left jugular line and feeding tube remain present.    The lungs show similar patchy infiltrates with small left pneumothorax occupying approximately 10% of the left chest and there is no evidence of right pneumothorax nor pleural effusion.    IMPRESSION:  Small left pneumothorax.    The above finding was conveyed by telephone to Dr. Gage at 2:30 PM on the day of the examination.    Thank you for the courtesy of this referral.            STEFANO SALAS MD; Attending Interventional Radiologist  This document has been electronically signed. Apr 8 2021  2:33PM    < end of copied text >    [ ]GOALS OF CARE DISCUSSION WITH PATIENT/FAMILY/PROXY:    CRITICAL CARE TIME SPENT: 35 minutes

## 2021-04-09 NOTE — PROGRESS NOTE ADULT - SUBJECTIVE AND OBJECTIVE BOX
Patient is a 55y old  Male who presents with a chief complaint of SOB (08 Apr 2021 17:31)    pt seen in icu [ x ], reg med floor [   ], bed [ x ], chair at bedside [   ], a+o x3 [  ], sedated [x  ],  nad [x  ]    andrea [ x ], ngt [x  ], et tube [ x ], cent line [x  ],        Allergies    No Known Allergies        Vitals    T(F): 97.9 (04-09-21 @ 04:00), Max: 100.1 (04-09-21 @ 00:00)  HR: 90 (04-09-21 @ 06:30) (86 - 114)  BP: 120/60 (04-09-21 @ 06:30) (97/55 - 129/69)  RR: 27 (04-09-21 @ 06:30) (15 - 31)  SpO2: 94% (04-09-21 @ 06:30) (83% - 97%)  Wt(kg): --  CAPILLARY BLOOD GLUCOSE      POCT Blood Glucose.: 101 mg/dL (09 Apr 2021 06:15)      Labs                          9.5    8.94  )-----------( 182      ( 09 Apr 2021 04:38 )             30.2       04-09    143  |  97  |  25<H>  ----------------------------<  93  3.4<L>   |  43<H>  |  0.36<L>    Ca    7.6<L>      09 Apr 2021 04:38  Phos  1.7     04-09  Mg     2.2     04-09    TPro  4.7<L>  /  Alb  1.3<L>  /  TBili  0.3  /  DBili  x   /  AST  36  /  ALT  49  /  AlkPhos  94  04-09            .Urine Clean Catch (Midstream)  03-15 @ 00:52   No growth  --  --          Radiology Results      Meds    MEDICATIONS  (STANDING):  ALBUTerol    90 MICROgram(s) HFA Inhaler 2 Puff(s) Inhalation every 6 hours  ascorbic acid 1000 milliGRAM(s) Oral daily  aspirin  chewable 81 milliGRAM(s) Oral daily  bisacodyl Suppository 10 milliGRAM(s) Rectal daily  chlorhexidine 2% Cloths 1 Application(s) Topical <User Schedule>  fentaNYL   Infusion. 5 MICROgram(s)/kG/Hr (41.9 mL/Hr) IV Continuous <Continuous>  furosemide   Injectable 40 milliGRAM(s) IV Push daily  hydrochlorothiazide 50 milliGRAM(s) Oral daily  insulin lispro (ADMELOG) corrective regimen sliding scale   SubCutaneous every 6 hours  lactulose Syrup 20 Gram(s) Oral two times a day  LORazepam     Tablet 2 milliGRAM(s) Oral two times a day  midazolam Infusion 0.02 mG/kG/Hr (1.68 mL/Hr) IV Continuous <Continuous>  mineral oil enema 133 milliLiter(s) Rectal daily  pantoprazole   Suspension 40 milliGRAM(s) Oral daily  phenylephrine    Infusion 0.5 MICROgram(s)/kG/Min (7.86 mL/Hr) IV Continuous <Continuous>  polyethylene glycol 3350 17 Gram(s) Oral daily  predniSONE   Tablet 30 milliGRAM(s) Oral daily  propofol Infusion 30.007 MICROgram(s)/kG/Min (15.1 mL/Hr) IV Continuous <Continuous>  senna 2 Tablet(s) Oral at bedtime      MEDICATIONS  (PRN):  acetaminophen    Suspension .. 650 milliGRAM(s) Oral every 6 hours PRN Temp greater or equal to 38C (100.4F)  sodium chloride 0.9% lock flush 10 milliLiter(s) IV Push every 1 hour PRN Pre/post blood products, medications, blood draw, and to maintain line patency      Physical Exam    Neuro :  no focal deficits  Respiratory: CTA B/L  CV: RRR, S1S2, no murmurs,   Abdominal: Soft, NT, ND +BS,  Extremities: No edema, + peripheral pulses      ASSESSMENT    Hypoxemia 2nd to covid pna   transaminitis  prediabetes  h/o appendectomy  cholecystectomy        PLAN    contact and airborne isolation  d/c remdesevir given covid ab positive noted   completed dexamethasone   started pulse steroids for 3 days - 250mg solumedrol bid now tapered to 30mg daily  cont asa, vit c,    cont albuterol inhaler   pulm f/u  procalcitonin, D-dimer, crp, ldh, ferritin, lactate noted ,    tmx 99.6  cont tylenol prn,   cont robitussin prn   pt on fentanyl, phenylephrine, midazolam and propofol drip  s/p intubation 3/29/21  O2 sat (84% - 98%) mech vent  O2 via mech vent and taper fio2 as tolerated   serial abg'sabg   vent mgmt as per icu  xray 3/19/21 with pneumomediastinum  rept cxr with New trace right apical pneumothorax. New mild left apical pneumothorax. Grossly stable small pneumomediastinum.  Soft tissue emphysema at the neck bases bilaterally. Grossly stable bilateral pulmonary infiltrates noted.   cxr 2/24 with No evidence of pneumothorax can be appreciated on the available image. This may be related to patient positioning. Evidence of pneumomediastinum and subcutaneous emphysema in the lower neck is again noted. There are patchy bibasilar infiltrates and elevated right hemidiaphragm noted.   cxr 3/29/21 with No significant change bilateral infiltrates. There is a small simple left apical pneumothorax. No significant pleural effusion. Bilateral subcutaneous emphysema similar to prior.   thoracic surg f/u   pt intubated 6AM 3/29/21, XRs on 7AM and 5PM with no obvious increase of ptx  no immediate need for CT at this time,   recommend close monitoring with serial CXRs   cxr 4/4/21 with Improving bilateral airspace disease noted   lispro ss   prognosis poor  cont current meds       Patient is a 55y old  Male who presents with a chief complaint of SOB (08 Apr 2021 17:31)    pt seen in icu [ x ], reg med floor [   ], bed [ x ], chair at bedside [   ], a+o x3 [  ], sedated [x  ],  nad [x  ]    andrea [ x ], ngt feed [x  ], et tube [ x ], cent line [x  ],        Allergies    No Known Allergies        Vitals    T(F): 97.9 (04-09-21 @ 04:00), Max: 100.1 (04-09-21 @ 00:00)  HR: 90 (04-09-21 @ 06:30) (86 - 114)  BP: 120/60 (04-09-21 @ 06:30) (97/55 - 129/69)  RR: 27 (04-09-21 @ 06:30) (15 - 31)  SpO2: 94% (04-09-21 @ 06:30) (83% - 97%)  Wt(kg): --  CAPILLARY BLOOD GLUCOSE      POCT Blood Glucose.: 101 mg/dL (09 Apr 2021 06:15)      Labs                          9.5    8.94  )-----------( 182      ( 09 Apr 2021 04:38 )             30.2       04-09    143  |  97  |  25<H>  ----------------------------<  93  3.4<L>   |  43<H>  |  0.36<L>    Ca    7.6<L>      09 Apr 2021 04:38  Phos  1.7     04-09  Mg     2.2     04-09    TPro  4.7<L>  /  Alb  1.3<L>  /  TBili  0.3  /  DBili  x   /  AST  36  /  ALT  49  /  AlkPhos  94  04-09            .Urine Clean Catch (Midstream)  03-15 @ 00:52   No growth  --  --          Radiology Results    < from: Xray Chest 1 View- PORTABLE-Routine (Xray Chest 1 View- PORTABLE-Routine in AM.) (04.08.21 @ 07:15) >  Frontal expiratory view of the chest shows the heart to be normal in size. Endotracheal tube, left jugular line and feeding tube remain present.    The lungs show similar patchy infiltrates with small left pneumothorax occupying approximately 10% of the left chest and there is no evidence of right pneumothorax nor pleural effusion.    IMPRESSION:  Small left pneumothorax.    < end of copied text >      Meds    MEDICATIONS  (STANDING):  ALBUTerol    90 MICROgram(s) HFA Inhaler 2 Puff(s) Inhalation every 6 hours  ascorbic acid 1000 milliGRAM(s) Oral daily  aspirin  chewable 81 milliGRAM(s) Oral daily  bisacodyl Suppository 10 milliGRAM(s) Rectal daily  chlorhexidine 2% Cloths 1 Application(s) Topical <User Schedule>  fentaNYL   Infusion. 5 MICROgram(s)/kG/Hr (41.9 mL/Hr) IV Continuous <Continuous>  furosemide   Injectable 40 milliGRAM(s) IV Push daily  hydrochlorothiazide 50 milliGRAM(s) Oral daily  insulin lispro (ADMELOG) corrective regimen sliding scale   SubCutaneous every 6 hours  lactulose Syrup 20 Gram(s) Oral two times a day  LORazepam     Tablet 2 milliGRAM(s) Oral two times a day  midazolam Infusion 0.02 mG/kG/Hr (1.68 mL/Hr) IV Continuous <Continuous>  mineral oil enema 133 milliLiter(s) Rectal daily  pantoprazole   Suspension 40 milliGRAM(s) Oral daily  phenylephrine    Infusion 0.5 MICROgram(s)/kG/Min (7.86 mL/Hr) IV Continuous <Continuous>  polyethylene glycol 3350 17 Gram(s) Oral daily  predniSONE   Tablet 30 milliGRAM(s) Oral daily  propofol Infusion 30.007 MICROgram(s)/kG/Min (15.1 mL/Hr) IV Continuous <Continuous>  senna 2 Tablet(s) Oral at bedtime      MEDICATIONS  (PRN):  acetaminophen    Suspension .. 650 milliGRAM(s) Oral every 6 hours PRN Temp greater or equal to 38C (100.4F)  sodium chloride 0.9% lock flush 10 milliLiter(s) IV Push every 1 hour PRN Pre/post blood products, medications, blood draw, and to maintain line patency      Physical Exam    Neuro :  no focal deficits  Respiratory: CTA B/L  CV: RRR, S1S2, no murmurs,   Abdominal: Soft, NT, ND +BS,  Extremities: No edema, + peripheral pulses      ASSESSMENT    Hypoxemia 2nd to covid pna   transaminitis  prediabetes  h/o appendectomy  cholecystectomy        PLAN    contact and airborne isolation  d/c remdesevir given covid ab positive noted   completed dexamethasone   started pulse steroids for 3 days - 250mg solumedrol bid now tapered to 30mg daily  cont asa, vit c,    cont albuterol inhaler   pulm f/u  procalcitonin, D-dimer, crp, ldh, ferritin, lactate noted ,    tmx 99.6  cont tylenol prn,   cont robitussin prn   pt on fentanyl, phenylephrine, midazolam and propofol drip  s/p intubation 3/29/21  O2 sat (84% - 98%) mech vent  O2 via mech vent and taper fio2 as tolerated   serial abg'sabg   vent mgmt as per icu  xray 3/19/21 with pneumomediastinum  rept cxr with New trace right apical pneumothorax. New mild left apical pneumothorax. Grossly stable small pneumomediastinum.  Soft tissue emphysema at the neck bases bilaterally. Grossly stable bilateral pulmonary infiltrates noted.   cxr 2/24 with No evidence of pneumothorax can be appreciated on the available image. This may be related to patient positioning. Evidence of pneumomediastinum and subcutaneous emphysema in the lower neck is again noted. There are patchy bibasilar infiltrates and elevated right hemidiaphragm noted.   cxr 3/29/21 with No significant change bilateral infiltrates. There is a small simple left apical pneumothorax. No significant pleural effusion. Bilateral subcutaneous emphysema similar to prior.   pt intubated 6AM 3/29/21,   XRs on 7AM and 5PM with no obvious increase of ptx  cxr 4/4/21 with Improving bilateral airspace disease noted    cxr 4/8/21 with Small left pneumothorax noted above.  thoracic surg f/u   s/p L chest tube placement  F/u CXR post placement, monitor site of placement   Daily CXR  Monitor O2 status   lispro ss   prognosis poor  cont current meds       Patient is a 55y old  Male who presents with a chief complaint of SOB (08 Apr 2021 17:31)    pt seen in icu [ x ], reg med floor [   ], bed [ x ], chair at bedside [   ], a+o x3 [  ], sedated [x  ],  nad [x  ]    andrea [ x ], ngt feed [x  ], et tube [ x ], cent line [x  ],        Allergies    No Known Allergies        Vitals    T(F): 97.9 (04-09-21 @ 04:00), Max: 100.1 (04-09-21 @ 00:00)  HR: 90 (04-09-21 @ 06:30) (86 - 114)  BP: 120/60 (04-09-21 @ 06:30) (97/55 - 129/69)  RR: 27 (04-09-21 @ 06:30) (15 - 31)  SpO2: 94% (04-09-21 @ 06:30) (83% - 97%)  Wt(kg): --  CAPILLARY BLOOD GLUCOSE      POCT Blood Glucose.: 101 mg/dL (09 Apr 2021 06:15)      Labs                          9.5    8.94  )-----------( 182      ( 09 Apr 2021 04:38 )             30.2       04-09    143  |  97  |  25<H>  ----------------------------<  93  3.4<L>   |  43<H>  |  0.36<L>    Ca    7.6<L>      09 Apr 2021 04:38  Phos  1.7     04-09  Mg     2.2     04-09    TPro  4.7<L>  /  Alb  1.3<L>  /  TBili  0.3  /  DBili  x   /  AST  36  /  ALT  49  /  AlkPhos  94  04-09    Blood Gas Profile - Arterial (04.09.21 @ 04:54)   pH, Arterial: 7.44   pCO2, Arterial: 81: TYPE:(C=Critical, N=Notification, A=Abnormal) C   pO2, Arterial: 71 mmHg   HCO3, Arterial: 55: TYPE:(C=Critical, N=Notification, A=Abnormal) C   Base Excess, Arterial: 25.3 mmol/L   Oxygen Saturation, Arterial: 98 %   FIO2, Arterial: 70         .Urine Clean Catch (Midstream)  03-15 @ 00:52   No growth  --  --          Radiology Results    < from: Xray Chest 1 View- PORTABLE-Routine (Xray Chest 1 View- PORTABLE-Routine in AM.) (04.08.21 @ 07:15) >  Frontal expiratory view of the chest shows the heart to be normal in size. Endotracheal tube, left jugular line and feeding tube remain present.    The lungs show similar patchy infiltrates with small left pneumothorax occupying approximately 10% of the left chest and there is no evidence of right pneumothorax nor pleural effusion.    IMPRESSION:  Small left pneumothorax.    < end of copied text >      Meds    MEDICATIONS  (STANDING):  ALBUTerol    90 MICROgram(s) HFA Inhaler 2 Puff(s) Inhalation every 6 hours  ascorbic acid 1000 milliGRAM(s) Oral daily  aspirin  chewable 81 milliGRAM(s) Oral daily  bisacodyl Suppository 10 milliGRAM(s) Rectal daily  chlorhexidine 2% Cloths 1 Application(s) Topical <User Schedule>  fentaNYL   Infusion. 5 MICROgram(s)/kG/Hr (41.9 mL/Hr) IV Continuous <Continuous>  furosemide   Injectable 40 milliGRAM(s) IV Push daily  hydrochlorothiazide 50 milliGRAM(s) Oral daily  insulin lispro (ADMELOG) corrective regimen sliding scale   SubCutaneous every 6 hours  lactulose Syrup 20 Gram(s) Oral two times a day  LORazepam     Tablet 2 milliGRAM(s) Oral two times a day  midazolam Infusion 0.02 mG/kG/Hr (1.68 mL/Hr) IV Continuous <Continuous>  mineral oil enema 133 milliLiter(s) Rectal daily  pantoprazole   Suspension 40 milliGRAM(s) Oral daily  phenylephrine    Infusion 0.5 MICROgram(s)/kG/Min (7.86 mL/Hr) IV Continuous <Continuous>  polyethylene glycol 3350 17 Gram(s) Oral daily  predniSONE   Tablet 30 milliGRAM(s) Oral daily  propofol Infusion 30.007 MICROgram(s)/kG/Min (15.1 mL/Hr) IV Continuous <Continuous>  senna 2 Tablet(s) Oral at bedtime      MEDICATIONS  (PRN):  acetaminophen    Suspension .. 650 milliGRAM(s) Oral every 6 hours PRN Temp greater or equal to 38C (100.4F)  sodium chloride 0.9% lock flush 10 milliLiter(s) IV Push every 1 hour PRN Pre/post blood products, medications, blood draw, and to maintain line patency      Physical Exam    Neuro :  no focal deficits  Respiratory: CTA B/L  CV: RRR, S1S2, no murmurs,   Abdominal: Soft, NT, ND +BS,  Extremities: No edema, + peripheral pulses      ASSESSMENT    Hypoxemia 2nd to covid pna   transaminitis  prediabetes  h/o appendectomy  cholecystectomy        PLAN    contact and airborne isolation  d/c remdesevir given covid ab positive noted   completed dexamethasone   started pulse steroids for 3 days - 250mg solumedrol bid now tapered to 30mg daily  cont asa, vit c,    cont albuterol inhaler   pulm f/u  procalcitonin, D-dimer, crp, ldh, ferritin, lactate noted ,    tmx 99.6  cont tylenol prn,   cont robitussin prn   pt on fentanyl, phenylephrine, midazolam and propofol drip  s/p intubation 3/29/21  O2 sat (83% - 97%) mech vent  O2 via mech vent and taper fio2 as tolerated   serial abg's   abg this am noted above  vent mgmt as per icu  xray 3/19/21 with pneumomediastinum  rept cxr with New trace right apical pneumothorax. New mild left apical pneumothorax. Grossly stable small pneumomediastinum.  Soft tissue emphysema at the neck bases bilaterally. Grossly stable bilateral pulmonary infiltrates noted.   cxr 2/24 with No evidence of pneumothorax can be appreciated on the available image. This may be related to patient positioning. Evidence of pneumomediastinum and subcutaneous emphysema in the lower neck is again noted. There are patchy bibasilar infiltrates and elevated right hemidiaphragm noted.   cxr 3/29/21 with No significant change bilateral infiltrates. There is a small simple left apical pneumothorax. No significant pleural effusion. Bilateral subcutaneous emphysema similar to prior.   pt intubated 6AM 3/29/21,   XRs on 7AM and 5PM with no obvious increase of ptx  cxr 4/4/21 with Improving bilateral airspace disease noted    cxr 4/8/21 with Small left pneumothorax noted above.  thoracic surg f/u   s/p L chest tube placement  F/u CXR post placement, monitor site of placement   Daily CXR  Monitor O2 status   lispro ss   prognosis poor  cont current meds

## 2021-04-09 NOTE — PROGRESS NOTE ADULT - SUBJECTIVE AND OBJECTIVE BOX
INTERVAL HPI/OVERNIGHT EVENTS:    Pt seen and examined at bedside. No acute events overnight.   Intubated, sedated, on pressor support   On isolation, COVID+    Vital Signs Last 24 Hrs  T(C): 36.6 (09 Apr 2021 04:00), Max: 37.8 (09 Apr 2021 00:00)  T(F): 97.9 (09 Apr 2021 04:00), Max: 100.1 (09 Apr 2021 00:00)  HR: 97 (09 Apr 2021 08:31) (86 - 114)  BP: 123/65 (09 Apr 2021 08:15) (101/80 - 129/69)  BP(mean): 75 (09 Apr 2021 08:15) (65 - 84)  RR: 30 (09 Apr 2021 08:15) (15 - 31)  SpO2: 93% (09 Apr 2021 08:31) (83% - 97%)  I&O's Detail    08 Apr 2021 07:01  -  09 Apr 2021 07:00  --------------------------------------------------------  IN:    Enteral Tube Flush: 100 mL    FentaNYL: 966 mL    Glucerna 1.5: 550 mL    IV PiggyBack: 499.8 mL    Midazolam: 260.4 mL    Phenylephrine: 3.2 mL    Propofol: 143.1 mL  Total IN: 2522.5 mL    OUT:    Indwelling Catheter - Urethral (mL): 3060 mL  Total OUT: 3060 mL    Total NET: -537.5 mL        bisacodyl Suppository 10 milliGRAM(s) Rectal daily  lactulose Syrup 20 Gram(s) Oral two times a day  mineral oil enema 133 milliLiter(s) Rectal daily  pantoprazole   Suspension 40 milliGRAM(s) Oral daily  polyethylene glycol 3350 17 Gram(s) Oral daily  senna 2 Tablet(s) Oral at bedtime      Physical Exam  General: Intubated, sedated   Pulm: left anterior pigtail in place, dressing c/d/i, no crepitus, pigtail to water seal, -AL    Labs:                        9.5    8.94  )-----------( 182      ( 09 Apr 2021 04:38 )             30.2     04-09    143  |  97  |  25<H>  ----------------------------<  93  3.4<L>   |  43<H>  |  0.36<L>    Ca    7.6<L>      09 Apr 2021 04:38  Phos  1.7     04-09  Mg     2.2     04-09    TPro  4.7<L>  /  Alb  1.3<L>  /  TBili  0.3  /  DBili  x   /  AST  36  /  ALT  49  /  AlkPhos  94  04-09

## 2021-04-09 NOTE — PROGRESS NOTE ADULT - ASSESSMENT
Assessment and Recommendation:   Problem/Recommendation - 1:  Problem: COVID-19. Recommendation: isolation precautions  cont mechanical ventilation  taper FIO2 as tolerated  Wean as tolerated  pulmonary toilet  NGT nutrition  ICU management.   Monitor oxygen sat  Monitor LFT, LDH, CRP, D-Dimer, Ferritin and procalcitonin  Vit C, D and zinc supp  Montelukast 10 mgs po Qhs  IV steroids.  Daily CXR   DVT and GI PPX     Problem/Recommendation - 2:  ·  Problem: UTI (urinary tract infection).  Recommendation: F.U cultures   Off Antibiotics.     Problem/Recommendation - 3:  ·  Problem: Chest pain.  Recommendation: likely related to Covid-19 infection.     Problem/Recommendation - 4:  ·  Problem: Transaminitis.  Recommendation: monitor LFTs  GI F/U     Problem/Recommendation - 5:  ·  Problem: Pneumothorax.  Recommendation: s/p left sided chest tube  Thoracic sx follow up  Daily  CXR   Management per ICU

## 2021-04-09 NOTE — PROGRESS NOTE ADULT - PROBLEM SELECTOR PLAN 6
2/2 acute illness. Patient sedated/intubated, on pressor, + L pneumothorax 2/2 covid-19 PNA. Plan for L chest tube. Dependent on ADLs. High risk of mortality.

## 2021-04-09 NOTE — CHART NOTE - NSCHARTNOTEFT_GEN_A_CORE
Patient's brother was informed about the daily update and plan for possible tracheostomy. He endorsed understanding and will discuss with his family.

## 2021-04-09 NOTE — PROGRESS NOTE ADULT - PROBLEM SELECTOR PLAN 7
At this time, patient's brother/Niko ([phone]) is the identified surrogate. Patient with high risk of mortality. Full code.  Spoke with patient's brother Niko in [place] regarding current condition, further goals of care. He expressed that they remain hopeful that he will start to make a recovery, they wish to continue all medical measures at this time. Advised prognosis remains guarded, and condition may change from day to day.  Discussed likely need for trach/PEG if O2 stabilizes by early next week given their goals. He verbalized understanding, will speak w his siblings but would want whatever procedure could help his brother. Support provided. Palliative will follow.

## 2021-04-09 NOTE — PROGRESS NOTE ADULT - ASSESSMENT
56 y/o Male s/p left ant pigtail for small pntx 4/8 COVID +    -Please place pigtail to suction   -Daily CXRs   -Care as per ICU

## 2021-04-09 NOTE — PROGRESS NOTE ADULT - SUBJECTIVE AND OBJECTIVE BOX
Patient is a 55y old  Male who presents with a chief complaint of SOB (09 Apr 2021 08:52)  Patient remains sedated, intubated on mechanical ventilation. Has left chest tube due to PTX. Remains on pressors meds. FIO2 70% with a sat of 94%    INTERVAL HPI/OVERNIGHT EVENTS:      VITAL SIGNS:  T(F): 97.9 (04-09-21 @ 04:00)  HR: 97 (04-09-21 @ 08:31)  BP: 123/65 (04-09-21 @ 08:15)  RR: 30 (04-09-21 @ 08:15)  SpO2: 93% (04-09-21 @ 08:31)  Wt(kg): --  I&O's Detail    08 Apr 2021 07:01  -  09 Apr 2021 07:00  --------------------------------------------------------  IN:    Enteral Tube Flush: 100 mL    FentaNYL: 966 mL    Glucerna 1.5: 550 mL    IV PiggyBack: 499.8 mL    Midazolam: 260.4 mL    Phenylephrine: 3.2 mL    Propofol: 143.1 mL  Total IN: 2522.5 mL    OUT:    Indwelling Catheter - Urethral (mL): 3060 mL  Total OUT: 3060 mL    Total NET: -537.5 mL        Mode: AC/ CMV (Assist Control/ Continuous Mandatory Ventilation)  RR (machine): 30  FiO2: 70  PEEP: 10  ITime: 0.8  MAP: 17  PC: 18  PIP: 29        REVIEW OF SYSTEMS:    CONSTITUTIONAL:  No fevers, chills, sweats    HEENT:  Eyes:  No diplopia or blurred vision. ENT:  No earache, sore throat or runny nose.    CARDIOVASCULAR:  No pressure, squeezing, tightness, or heaviness about the chest; no palpitations.    RESPIRATORY:  Per HPI    GASTROINTESTINAL:  No abdominal pain, nausea, vomiting or diarrhea.    GENITOURINARY:  No dysuria, frequency or urgency.    NEUROLOGIC:  No paresthesias, fasciculations, seizures or weakness.    PSYCHIATRIC:  No disorder of thought or mood.      PHYSICAL EXAM:    Constitutional: Well developed and nourished  Eyes:Perrla  ENMT: normal  Neck:supple  Respiratory: good air entry  Cardiovascular: S1 S2 regular  Gastrointestinal: Soft, Non tender  Extremities: No edema  Vascular:normal  Neurological:Sedated  Musculoskeletal:Normal      MEDICATIONS  (STANDING):  ALBUTerol    90 MICROgram(s) HFA Inhaler 2 Puff(s) Inhalation every 6 hours  ascorbic acid 1000 milliGRAM(s) Oral daily  aspirin  chewable 81 milliGRAM(s) Oral daily  bisacodyl Suppository 10 milliGRAM(s) Rectal daily  chlorhexidine 2% Cloths 1 Application(s) Topical <User Schedule>  fentaNYL   Infusion. 5 MICROgram(s)/kG/Hr (41.9 mL/Hr) IV Continuous <Continuous>  furosemide   Injectable 40 milliGRAM(s) IV Push daily  hydrochlorothiazide 50 milliGRAM(s) Oral daily  insulin lispro (ADMELOG) corrective regimen sliding scale   SubCutaneous every 6 hours  lactulose Syrup 20 Gram(s) Oral two times a day  LORazepam     Tablet 2 milliGRAM(s) Oral two times a day  midazolam Infusion 0.02 mG/kG/Hr (1.68 mL/Hr) IV Continuous <Continuous>  mineral oil enema 133 milliLiter(s) Rectal daily  pantoprazole   Suspension 40 milliGRAM(s) Oral daily  phenylephrine    Infusion 0.5 MICROgram(s)/kG/Min (7.86 mL/Hr) IV Continuous <Continuous>  polyethylene glycol 3350 17 Gram(s) Oral daily  predniSONE   Tablet 30 milliGRAM(s) Oral daily  propofol Infusion 30.007 MICROgram(s)/kG/Min (15.1 mL/Hr) IV Continuous <Continuous>  senna 2 Tablet(s) Oral at bedtime    MEDICATIONS  (PRN):  acetaminophen    Suspension .. 650 milliGRAM(s) Oral every 6 hours PRN Temp greater or equal to 38C (100.4F)  sodium chloride 0.9% lock flush 10 milliLiter(s) IV Push every 1 hour PRN Pre/post blood products, medications, blood draw, and to maintain line patency      Allergies    No Known Allergies    Intolerances        LABS:                        9.5    8.94  )-----------( 182      ( 09 Apr 2021 04:38 )             30.2     04-09    143  |  97  |  25<H>  ----------------------------<  93  3.4<L>   |  43<H>  |  0.36<L>    Ca    7.6<L>      09 Apr 2021 04:38  Phos  1.7     04-09  Mg     2.2     04-09    TPro  4.7<L>  /  Alb  1.3<L>  /  TBili  0.3  /  DBili  x   /  AST  36  /  ALT  49  /  AlkPhos  94  04-09        ABG - ( 09 Apr 2021 04:54 )  pH, Arterial: 7.44  pH, Blood: x     /  pCO2: 81    /  pO2: 71    / HCO3: 55    / Base Excess: 25.3  /  SaO2: 98                    CAPILLARY BLOOD GLUCOSE      POCT Blood Glucose.: 101 mg/dL (09 Apr 2021 06:15)  POCT Blood Glucose.: 50 mg/dL (09 Apr 2021 06:13)  POCT Blood Glucose.: 106 mg/dL (08 Apr 2021 23:12)  POCT Blood Glucose.: 118 mg/dL (08 Apr 2021 16:27)  POCT Blood Glucose.: 137 mg/dL (08 Apr 2021 10:12)    pro-bnp -- 04-09 @ 04:38     d-dimer 507  04-09 @ 04:38  pro-bnp -- 04-06 @ 05:03     d-dimer 363  04-06 @ 05:03  pro-bnp -- 04-03 @ 03:48     d-dimer 1250  04-03 @ 03:48      RADIOLOGY & ADDITIONAL TESTS:    CXR:  < from: Xray Chest 1 View- PORTABLE-Routine (Xray Chest 1 View- PORTABLE-Routine in AM.) (04.08.21 @ 07:15) >  IMPRESSION:  Small left pneumothorax.    The above finding was conveyed by telephone to Dr. Gage at 2:30 PM on the day of the examination.    Thank you for the courtesy of this referral.      < end of copied text >    Ct scan chest:    ekg;    echo:

## 2021-04-10 LAB
ALBUMIN SERPL ELPH-MCNC: 2.4 G/DL — LOW (ref 3.5–5)
ALP SERPL-CCNC: 70 U/L — SIGNIFICANT CHANGE UP (ref 40–120)
ALT FLD-CCNC: 53 U/L DA — SIGNIFICANT CHANGE UP (ref 10–60)
ANION GAP SERPL CALC-SCNC: 1 MMOL/L — LOW (ref 5–17)
ANION GAP SERPL CALC-SCNC: 4 MMOL/L — LOW (ref 5–17)
ANION GAP SERPL CALC-SCNC: <1 MMOL/L — LOW (ref 5–17)
ANISOCYTOSIS BLD QL: SIGNIFICANT CHANGE UP
AST SERPL-CCNC: 42 U/L — HIGH (ref 10–40)
BASE EXCESS BLDA CALC-SCNC: 23.3 MMOL/L — HIGH (ref -2–3)
BASOPHILS # BLD AUTO: 0 K/UL — SIGNIFICANT CHANGE UP (ref 0–0.2)
BASOPHILS NFR BLD AUTO: 0 % — SIGNIFICANT CHANGE UP (ref 0–2)
BILIRUB SERPL-MCNC: 0.9 MG/DL — SIGNIFICANT CHANGE UP (ref 0.2–1.2)
BLOOD GAS COMMENTS ARTERIAL: SIGNIFICANT CHANGE UP
BUN SERPL-MCNC: 20 MG/DL — HIGH (ref 7–18)
BUN SERPL-MCNC: 22 MG/DL — HIGH (ref 7–18)
CALCIUM SERPL-MCNC: 8.2 MG/DL — LOW (ref 8.4–10.5)
CALCIUM SERPL-MCNC: 8.3 MG/DL — LOW (ref 8.4–10.5)
CHLORIDE SERPL-SCNC: 94 MMOL/L — LOW (ref 96–108)
CHLORIDE SERPL-SCNC: 95 MMOL/L — LOW (ref 96–108)
CO2 SERPL-SCNC: 39 MMOL/L — HIGH (ref 22–31)
CO2 SERPL-SCNC: 44 MMOL/L — HIGH (ref 22–31)
CO2 SERPL-SCNC: >45 MMOL/L — CRITICAL HIGH (ref 22–31)
CREAT SERPL-MCNC: 0.27 MG/DL — LOW (ref 0.5–1.3)
CREAT SERPL-MCNC: 0.47 MG/DL — LOW (ref 0.5–1.3)
CRP SERPL-MCNC: 86 MG/L — HIGH
EOSINOPHIL # BLD AUTO: 0.2 K/UL — SIGNIFICANT CHANGE UP (ref 0–0.5)
EOSINOPHIL NFR BLD AUTO: 2 % — SIGNIFICANT CHANGE UP (ref 0–6)
FERRITIN SERPL-MCNC: 1005 NG/ML — HIGH (ref 30–400)
FERRITIN SERPL-MCNC: 995 NG/ML — HIGH (ref 30–400)
GLUCOSE BLDC GLUCOMTR-MCNC: 119 MG/DL — HIGH (ref 70–99)
GLUCOSE BLDC GLUCOMTR-MCNC: 124 MG/DL — HIGH (ref 70–99)
GLUCOSE BLDC GLUCOMTR-MCNC: 156 MG/DL — HIGH (ref 70–99)
GLUCOSE BLDC GLUCOMTR-MCNC: 98 MG/DL — SIGNIFICANT CHANGE UP (ref 70–99)
GLUCOSE SERPL-MCNC: 141 MG/DL — HIGH (ref 70–99)
GLUCOSE SERPL-MCNC: 99 MG/DL — SIGNIFICANT CHANGE UP (ref 70–99)
HCO3 BLDA-SCNC: 51 MMOL/L — CRITICAL HIGH (ref 21–28)
HCT VFR BLD CALC: 28.8 % — LOW (ref 39–50)
HGB BLD-MCNC: 9.1 G/DL — LOW (ref 13–17)
HOROWITZ INDEX BLDA+IHG-RTO: 70 — SIGNIFICANT CHANGE UP
HYPOCHROMIA BLD QL: SLIGHT — SIGNIFICANT CHANGE UP
LYMPHOCYTES # BLD AUTO: 1.22 K/UL — SIGNIFICANT CHANGE UP (ref 1–3.3)
LYMPHOCYTES # BLD AUTO: 12 % — LOW (ref 13–44)
MACROCYTES BLD QL: SLIGHT — SIGNIFICANT CHANGE UP
MAGNESIUM SERPL-MCNC: 2 MG/DL — SIGNIFICANT CHANGE UP (ref 1.6–2.6)
MAGNESIUM SERPL-MCNC: 2.1 MG/DL — SIGNIFICANT CHANGE UP (ref 1.6–2.6)
MANUAL SMEAR VERIFICATION: SIGNIFICANT CHANGE UP
MCHC RBC-ENTMCNC: 31.4 PG — SIGNIFICANT CHANGE UP (ref 27–34)
MCHC RBC-ENTMCNC: 31.6 GM/DL — LOW (ref 32–36)
MCV RBC AUTO: 99.3 FL — SIGNIFICANT CHANGE UP (ref 80–100)
MICROCYTES BLD QL: SLIGHT — SIGNIFICANT CHANGE UP
MONOCYTES # BLD AUTO: 0.2 K/UL — SIGNIFICANT CHANGE UP (ref 0–0.9)
MONOCYTES NFR BLD AUTO: 2 % — SIGNIFICANT CHANGE UP (ref 2–14)
MYELOCYTES NFR BLD: 1 % — HIGH (ref 0–0)
NEUTROPHILS # BLD AUTO: 8.45 K/UL — HIGH (ref 1.8–7.4)
NEUTROPHILS NFR BLD AUTO: 74 % — SIGNIFICANT CHANGE UP (ref 43–77)
NEUTS BAND # BLD: 9 % — HIGH (ref 0–8)
NRBC # BLD: 0 /100 — SIGNIFICANT CHANGE UP (ref 0–0)
PCO2 BLDA: 69 MMHG — HIGH (ref 35–48)
PH BLDA: 7.48 — HIGH (ref 7.35–7.45)
PHOSPHATE SERPL-MCNC: 2.1 MG/DL — LOW (ref 2.5–4.5)
PHOSPHATE SERPL-MCNC: 4.7 MG/DL — HIGH (ref 2.5–4.5)
PLAT MORPH BLD: NORMAL — SIGNIFICANT CHANGE UP
PLATELET # BLD AUTO: 184 K/UL — SIGNIFICANT CHANGE UP (ref 150–400)
PLATELET COUNT - ESTIMATE: NORMAL — SIGNIFICANT CHANGE UP
PO2 BLDA: 62 MMHG — LOW (ref 83–108)
POIKILOCYTOSIS BLD QL AUTO: SLIGHT — SIGNIFICANT CHANGE UP
POLYCHROMASIA BLD QL SMEAR: SLIGHT — SIGNIFICANT CHANGE UP
POTASSIUM SERPL-MCNC: 3.7 MMOL/L — SIGNIFICANT CHANGE UP (ref 3.5–5.3)
POTASSIUM SERPL-MCNC: 3.8 MMOL/L — SIGNIFICANT CHANGE UP (ref 3.5–5.3)
POTASSIUM SERPL-SCNC: 3.7 MMOL/L — SIGNIFICANT CHANGE UP (ref 3.5–5.3)
POTASSIUM SERPL-SCNC: 3.8 MMOL/L — SIGNIFICANT CHANGE UP (ref 3.5–5.3)
PROCALCITONIN SERPL-MCNC: 0.37 NG/ML — HIGH (ref 0.02–0.1)
PROT SERPL-MCNC: 5.4 G/DL — LOW (ref 6–8.3)
RBC # BLD: 2.9 M/UL — LOW (ref 4.2–5.8)
RBC # FLD: 12.5 % — SIGNIFICANT CHANGE UP (ref 10.3–14.5)
RBC BLD AUTO: ABNORMAL
SAO2 % BLDA: 94 % — SIGNIFICANT CHANGE UP
SODIUM SERPL-SCNC: 137 MMOL/L — SIGNIFICANT CHANGE UP (ref 135–145)
SODIUM SERPL-SCNC: 140 MMOL/L — SIGNIFICANT CHANGE UP (ref 135–145)
WBC # BLD: 10.18 K/UL — SIGNIFICANT CHANGE UP (ref 3.8–10.5)
WBC # FLD AUTO: 10.18 K/UL — SIGNIFICANT CHANGE UP (ref 3.8–10.5)

## 2021-04-10 PROCEDURE — 71045 X-RAY EXAM CHEST 1 VIEW: CPT | Mod: 26

## 2021-04-10 RX ORDER — DEXMEDETOMIDINE HYDROCHLORIDE IN 0.9% SODIUM CHLORIDE 4 UG/ML
1.5 INJECTION INTRAVENOUS
Qty: 400 | Refills: 0 | Status: DISCONTINUED | OUTPATIENT
Start: 2021-04-10 | End: 2021-04-10

## 2021-04-10 RX ORDER — ACETAZOLAMIDE 250 MG/1
250 TABLET ORAL EVERY 12 HOURS
Refills: 0 | Status: COMPLETED | OUTPATIENT
Start: 2021-04-10 | End: 2021-04-12

## 2021-04-10 RX ORDER — POTASSIUM PHOSPHATE, MONOBASIC POTASSIUM PHOSPHATE, DIBASIC 236; 224 MG/ML; MG/ML
15 INJECTION, SOLUTION INTRAVENOUS ONCE
Refills: 0 | Status: COMPLETED | OUTPATIENT
Start: 2021-04-10 | End: 2021-04-10

## 2021-04-10 RX ORDER — HYDROMORPHONE HYDROCHLORIDE 2 MG/ML
2 INJECTION INTRAMUSCULAR; INTRAVENOUS; SUBCUTANEOUS ONCE
Refills: 0 | Status: DISCONTINUED | OUTPATIENT
Start: 2021-04-10 | End: 2021-04-10

## 2021-04-10 RX ORDER — PROPOFOL 10 MG/ML
20 INJECTION, EMULSION INTRAVENOUS
Qty: 1000 | Refills: 0 | Status: DISCONTINUED | OUTPATIENT
Start: 2021-04-10 | End: 2021-04-11

## 2021-04-10 RX ORDER — DEXMEDETOMIDINE HYDROCHLORIDE IN 0.9% SODIUM CHLORIDE 4 UG/ML
1 INJECTION INTRAVENOUS
Qty: 400 | Refills: 0 | Status: DISCONTINUED | OUTPATIENT
Start: 2021-04-10 | End: 2021-04-14

## 2021-04-10 RX ORDER — POTASSIUM CHLORIDE 20 MEQ
20 PACKET (EA) ORAL ONCE
Refills: 0 | Status: COMPLETED | OUTPATIENT
Start: 2021-04-10 | End: 2021-04-10

## 2021-04-10 RX ORDER — FENTANYL CITRATE 50 UG/ML
4 INJECTION INTRAVENOUS
Qty: 2500 | Refills: 0 | Status: DISCONTINUED | OUTPATIENT
Start: 2021-04-10 | End: 2021-04-18

## 2021-04-10 RX ORDER — PROPOFOL 10 MG/ML
30.01 INJECTION, EMULSION INTRAVENOUS
Qty: 1000 | Refills: 0 | Status: DISCONTINUED | OUTPATIENT
Start: 2021-04-10 | End: 2021-04-10

## 2021-04-10 RX ADMIN — PROPOFOL 10.1 MICROGRAM(S)/KG/MIN: 10 INJECTION, EMULSION INTRAVENOUS at 16:41

## 2021-04-10 RX ADMIN — FENTANYL CITRATE 33.5 MICROGRAM(S)/KG/HR: 50 INJECTION INTRAVENOUS at 17:10

## 2021-04-10 RX ADMIN — ALBUTEROL 2 PUFF(S): 90 AEROSOL, METERED ORAL at 03:55

## 2021-04-10 RX ADMIN — Medication 2 MILLIGRAM(S): at 10:56

## 2021-04-10 RX ADMIN — ACETAZOLAMIDE 105 MILLIGRAM(S): 250 TABLET ORAL at 17:09

## 2021-04-10 RX ADMIN — Medication 20 MILLIEQUIVALENT(S): at 01:37

## 2021-04-10 RX ADMIN — CHLORHEXIDINE GLUCONATE 1 APPLICATION(S): 213 SOLUTION TOPICAL at 05:36

## 2021-04-10 RX ADMIN — HYDROMORPHONE HYDROCHLORIDE 2 MILLIGRAM(S): 2 INJECTION INTRAMUSCULAR; INTRAVENOUS; SUBCUTANEOUS at 04:22

## 2021-04-10 RX ADMIN — PANTOPRAZOLE SODIUM 40 MILLIGRAM(S): 20 TABLET, DELAYED RELEASE ORAL at 11:04

## 2021-04-10 RX ADMIN — ALBUTEROL 2 PUFF(S): 90 AEROSOL, METERED ORAL at 15:58

## 2021-04-10 RX ADMIN — Medication 650 MILLIGRAM(S): at 21:06

## 2021-04-10 RX ADMIN — DEXMEDETOMIDINE HYDROCHLORIDE IN 0.9% SODIUM CHLORIDE 21 MICROGRAM(S)/KG/HR: 4 INJECTION INTRAVENOUS at 16:40

## 2021-04-10 RX ADMIN — Medication 2 MILLIGRAM(S): at 21:06

## 2021-04-10 RX ADMIN — DEXMEDETOMIDINE HYDROCHLORIDE IN 0.9% SODIUM CHLORIDE 31.5 MICROGRAM(S)/KG/HR: 4 INJECTION INTRAVENOUS at 13:21

## 2021-04-10 RX ADMIN — ACETAZOLAMIDE 105 MILLIGRAM(S): 250 TABLET ORAL at 10:52

## 2021-04-10 RX ADMIN — Medication 30 MILLIGRAM(S): at 05:40

## 2021-04-10 RX ADMIN — Medication 1: at 11:23

## 2021-04-10 RX ADMIN — SENNA PLUS 2 TABLET(S): 8.6 TABLET ORAL at 21:06

## 2021-04-10 RX ADMIN — FENTANYL CITRATE 33.5 MICROGRAM(S)/KG/HR: 50 INJECTION INTRAVENOUS at 07:12

## 2021-04-10 RX ADMIN — POTASSIUM PHOSPHATE, MONOBASIC POTASSIUM PHOSPHATE, DIBASIC 62.5 MILLIMOLE(S): 236; 224 INJECTION, SOLUTION INTRAVENOUS at 09:48

## 2021-04-10 RX ADMIN — Medication 650 MILLIGRAM(S): at 05:55

## 2021-04-10 RX ADMIN — HYDROMORPHONE HYDROCHLORIDE 2 MILLIGRAM(S): 2 INJECTION INTRAMUSCULAR; INTRAVENOUS; SUBCUTANEOUS at 05:35

## 2021-04-10 RX ADMIN — Medication 81 MILLIGRAM(S): at 11:03

## 2021-04-10 RX ADMIN — Medication 50 MILLILITER(S): at 05:43

## 2021-04-10 RX ADMIN — Medication 40 MILLIGRAM(S): at 17:09

## 2021-04-10 RX ADMIN — Medication 650 MILLIGRAM(S): at 22:15

## 2021-04-10 RX ADMIN — Medication 50 MILLILITER(S): at 17:08

## 2021-04-10 RX ADMIN — DEXMEDETOMIDINE HYDROCHLORIDE IN 0.9% SODIUM CHLORIDE 31.5 MICROGRAM(S)/KG/HR: 4 INJECTION INTRAVENOUS at 07:13

## 2021-04-10 RX ADMIN — ALBUTEROL 2 PUFF(S): 90 AEROSOL, METERED ORAL at 08:24

## 2021-04-10 RX ADMIN — Medication 50 MILLIGRAM(S): at 05:40

## 2021-04-10 RX ADMIN — ALBUTEROL 2 PUFF(S): 90 AEROSOL, METERED ORAL at 20:46

## 2021-04-10 RX ADMIN — Medication 1000 MILLIGRAM(S): at 11:04

## 2021-04-10 RX ADMIN — Medication 650 MILLIGRAM(S): at 07:13

## 2021-04-10 RX ADMIN — Medication 50 MILLILITER(S): at 11:03

## 2021-04-10 RX ADMIN — Medication 40 MILLIGRAM(S): at 05:40

## 2021-04-10 RX ADMIN — Medication 50 MILLILITER(S): at 23:19

## 2021-04-10 RX ADMIN — Medication 2 MILLIGRAM(S): at 05:40

## 2021-04-10 NOTE — PROGRESS NOTE ADULT - SUBJECTIVE AND OBJECTIVE BOX
INTERVAL HPI/OVERNIGHT EVENTS: Patient remained sedated and intubated .    PRESSORS: [ ] YES [ ] NO  WHICH:    ANTIBIOTICS:                      Antimicrobial:    Cardiovascular:  furosemide   Injectable 40 milliGRAM(s) IV Push two times a day  furosemide   Injectable 40 milliGRAM(s) IV Push daily  hydrochlorothiazide 50 milliGRAM(s) Oral daily  phenylephrine    Infusion 0.5 MICROgram(s)/kG/Min IV Continuous <Continuous>    Pulmonary:  ALBUTerol    90 MICROgram(s) HFA Inhaler 2 Puff(s) Inhalation every 6 hours    Hematalogic:  aspirin  chewable 81 milliGRAM(s) Oral daily    Other:  acetaminophen    Suspension .. 650 milliGRAM(s) Oral every 6 hours PRN  albumin human 25% IVPB 50 milliLiter(s) IV Intermittent every 6 hours  ascorbic acid 1000 milliGRAM(s) Oral daily  bisacodyl Suppository 10 milliGRAM(s) Rectal daily  chlorhexidine 2% Cloths 1 Application(s) Topical <User Schedule>  dexMEDEtomidine Infusion 0.2 MICROgram(s)/kG/Hr IV Continuous <Continuous>  fentaNYL   Infusion. 5 MICROgram(s)/kG/Hr IV Continuous <Continuous>  insulin lispro (ADMELOG) corrective regimen sliding scale   SubCutaneous every 6 hours  lactulose Syrup 20 Gram(s) Oral two times a day  LORazepam     Tablet 2 milliGRAM(s) Oral two times a day  midazolam Infusion 0.02 mG/kG/Hr IV Continuous <Continuous>  mineral oil enema 133 milliLiter(s) Rectal daily  pantoprazole   Suspension 40 milliGRAM(s) Oral daily  polyethylene glycol 3350 17 Gram(s) Oral daily  predniSONE   Tablet 30 milliGRAM(s) Oral daily  senna 2 Tablet(s) Oral at bedtime  sodium chloride 0.9% lock flush 10 milliLiter(s) IV Push every 1 hour PRN    acetaminophen    Suspension .. 650 milliGRAM(s) Oral every 6 hours PRN  albumin human 25% IVPB 50 milliLiter(s) IV Intermittent every 6 hours  ALBUTerol    90 MICROgram(s) HFA Inhaler 2 Puff(s) Inhalation every 6 hours  ascorbic acid 1000 milliGRAM(s) Oral daily  aspirin  chewable 81 milliGRAM(s) Oral daily  bisacodyl Suppository 10 milliGRAM(s) Rectal daily  chlorhexidine 2% Cloths 1 Application(s) Topical <User Schedule>  dexMEDEtomidine Infusion 0.2 MICROgram(s)/kG/Hr IV Continuous <Continuous>  fentaNYL   Infusion. 5 MICROgram(s)/kG/Hr IV Continuous <Continuous>  furosemide   Injectable 40 milliGRAM(s) IV Push two times a day  furosemide   Injectable 40 milliGRAM(s) IV Push daily  hydrochlorothiazide 50 milliGRAM(s) Oral daily  insulin lispro (ADMELOG) corrective regimen sliding scale   SubCutaneous every 6 hours  lactulose Syrup 20 Gram(s) Oral two times a day  LORazepam     Tablet 2 milliGRAM(s) Oral two times a day  midazolam Infusion 0.02 mG/kG/Hr IV Continuous <Continuous>  mineral oil enema 133 milliLiter(s) Rectal daily  pantoprazole   Suspension 40 milliGRAM(s) Oral daily  phenylephrine    Infusion 0.5 MICROgram(s)/kG/Min IV Continuous <Continuous>  polyethylene glycol 3350 17 Gram(s) Oral daily  predniSONE   Tablet 30 milliGRAM(s) Oral daily  senna 2 Tablet(s) Oral at bedtime  sodium chloride 0.9% lock flush 10 milliLiter(s) IV Push every 1 hour PRN    Drug Dosing Weight  Height (cm): 167.6 (14 Mar 2021 12:03)  Weight (kg): 83.869 (14 Mar 2021 12:03)  BMI (kg/m2): 29.9 (14 Mar 2021 12:03)  BSA (m2): 1.93 (14 Mar 2021 12:03)    CENTRAL LINE: [ ] YES [ ] NO  LOCATION:   DATE INSERTED:  REMOVE: [ ] YES [ ] NO  EXPLAIN:    RIVERA: [ ] YES [ ] NO    DATE INSERTED:  REMOVE:  [ ] YES [ ] NO  EXPLAIN:    A-LINE:  [ ] YES [ ] NO  LOCATION:   DATE INSERTED:  REMOVE:  [ ] YES [ ] NO  EXPLAIN:    PMH -reviewed admission note, no change since admission    ICU Vital Signs Last 24 Hrs  T(C): 37.3 (09 Apr 2021 23:00), Max: 37.9 (09 Apr 2021 19:34)  T(F): 99.1 (09 Apr 2021 23:00), Max: 100.2 (09 Apr 2021 19:34)  HR: 71 (10 Apr 2021 00:24) (71 - 120)  BP: 73/48 (09 Apr 2021 22:00) (73/48 - 152/78)  BP(mean): 54 (09 Apr 2021 22:00) (54 - 95)  ABP: 102/55 (09 Apr 2021 23:00) (84/46 - 191/93)  ABP(mean): 68 (09 Apr 2021 23:00) (56 - 124)  RR: 30 (09 Apr 2021 23:00) (12 - 34)  SpO2: 92% (10 Apr 2021 00:24) (85% - 100%)      ABG - ( 09 Apr 2021 04:54 )  pH, Arterial: 7.44  pH, Blood: x     /  pCO2: 81    /  pO2: 71    / HCO3: 55    / Base Excess: 25.3  /  SaO2: 98                    04-08 @ 07:01  -  04-09 @ 07:00  --------------------------------------------------------  IN: 2607.4 mL / OUT: 3260 mL / NET: -652.6 mL        Mode: AC/ CMV (Assist Control/ Continuous Mandatory Ventilation)  RR (machine): 30  FiO2: 70  PEEP: 10  ITime: 0.8  MAP: 18  PC: 20  PIP: 31      PHYSICAL EXAM:    GGENERAL: NAD on vent  HEAD:  Atraumatic, Normocephalic  EYES:  PERRLA, conjunctiva and sclera clear  MOUTH : ETT  NECK: Supple,  No JVD; Normal thyroid; Trachea midline [facility]  NERVOUS SYSTEM:  sedated  CHEST/LUNG: No chest deformity; Normal percussion bilaterally; No rhonchi, wheezing   HEART: Regular rate and rhythm; No murmurs, rubs, or gallops  ABDOMEN: Soft, Nontender, Nondistended; Bowel sounds present  EXTREMITIES:  b/l upper extremities edema +2,  no edema on lower legs. No clubbing, cyanosis   LYMPH: No lymphadenopathy noted  SKIN: No rashes  Good capillary refill    LABS:  CBC Full  -  ( 09 Apr 2021 04:38 )  WBC Count : 8.94 K/uL  RBC Count : 2.96 M/uL  Hemoglobin : 9.5 g/dL  Hematocrit : 30.2 %  Platelet Count - Automated : 182 K/uL  Mean Cell Volume : 102.0 fl  Mean Cell Hemoglobin : 32.1 pg  Mean Cell Hemoglobin Concentration : 31.5 gm/dL  Auto Neutrophil # : 7.24 K/uL  Auto Lymphocyte # : 1.13 K/uL  Auto Monocyte # : 0.13 K/uL  Auto Eosinophil # : 0.13 K/uL  Auto Basophil # : 0.02 K/uL  Auto Neutrophil % : 81.0 %  Auto Lymphocyte % : 12.6 %  Auto Monocyte % : 1.5 %  Auto Eosinophil % : 1.5 %  Auto Basophil % : 0.2 %    04-09    139  |  93<L>  |  24<H>  ----------------------------<  95  3.9   |  >45<HH>  |  0.41<L>    Ca    7.6<L>      09 Apr 2021 23:12  Phos  3.3     04-09  Mg     2.1     04-09    TPro  4.7<L>  /  Alb  1.3<L>  /  TBili  0.3  /  DBili  x   /  AST  36  /  ALT  49  /  AlkPhos  94  04-09            RADIOLOGY & ADDITIONAL STUDIES REVIEWED:  ***    GOALS OF CARE DISCUSSION WITH PATIENT/FAMILY/PROXY:    CRITICAL CARE TIME SPENT: 35 minutes INTERVAL HPI/OVERNIGHT EVENTS: Patient remained sedated and intubated .    PRESSORS: [ ] YES [ ] NO  WHICH:    ANTIBIOTICS:                      Antimicrobial:    Cardiovascular:  furosemide   Injectable 40 milliGRAM(s) IV Push two times a day  furosemide   Injectable 40 milliGRAM(s) IV Push daily  hydrochlorothiazide 50 milliGRAM(s) Oral daily  phenylephrine    Infusion 0.5 MICROgram(s)/kG/Min IV Continuous <Continuous>    Pulmonary:  ALBUTerol    90 MICROgram(s) HFA Inhaler 2 Puff(s) Inhalation every 6 hours    Hematalogic:  aspirin  chewable 81 milliGRAM(s) Oral daily    Other:  acetaminophen    Suspension .. 650 milliGRAM(s) Oral every 6 hours PRN  albumin human 25% IVPB 50 milliLiter(s) IV Intermittent every 6 hours  ascorbic acid 1000 milliGRAM(s) Oral daily  bisacodyl Suppository 10 milliGRAM(s) Rectal daily  chlorhexidine 2% Cloths 1 Application(s) Topical <User Schedule>  dexMEDEtomidine Infusion 0.2 MICROgram(s)/kG/Hr IV Continuous <Continuous>  fentaNYL   Infusion. 5 MICROgram(s)/kG/Hr IV Continuous <Continuous>  insulin lispro (ADMELOG) corrective regimen sliding scale   SubCutaneous every 6 hours  lactulose Syrup 20 Gram(s) Oral two times a day  LORazepam     Tablet 2 milliGRAM(s) Oral two times a day  midazolam Infusion 0.02 mG/kG/Hr IV Continuous <Continuous>  mineral oil enema 133 milliLiter(s) Rectal daily  pantoprazole   Suspension 40 milliGRAM(s) Oral daily  polyethylene glycol 3350 17 Gram(s) Oral daily  predniSONE   Tablet 30 milliGRAM(s) Oral daily  senna 2 Tablet(s) Oral at bedtime  sodium chloride 0.9% lock flush 10 milliLiter(s) IV Push every 1 hour PRN    acetaminophen    Suspension .. 650 milliGRAM(s) Oral every 6 hours PRN  albumin human 25% IVPB 50 milliLiter(s) IV Intermittent every 6 hours  ALBUTerol    90 MICROgram(s) HFA Inhaler 2 Puff(s) Inhalation every 6 hours  ascorbic acid 1000 milliGRAM(s) Oral daily  aspirin  chewable 81 milliGRAM(s) Oral daily  bisacodyl Suppository 10 milliGRAM(s) Rectal daily  chlorhexidine 2% Cloths 1 Application(s) Topical <User Schedule>  dexMEDEtomidine Infusion 0.2 MICROgram(s)/kG/Hr IV Continuous <Continuous>  fentaNYL   Infusion. 5 MICROgram(s)/kG/Hr IV Continuous <Continuous>  furosemide   Injectable 40 milliGRAM(s) IV Push two times a day  furosemide   Injectable 40 milliGRAM(s) IV Push daily  hydrochlorothiazide 50 milliGRAM(s) Oral daily  insulin lispro (ADMELOG) corrective regimen sliding scale   SubCutaneous every 6 hours  lactulose Syrup 20 Gram(s) Oral two times a day  LORazepam     Tablet 2 milliGRAM(s) Oral two times a day  midazolam Infusion 0.02 mG/kG/Hr IV Continuous <Continuous>  mineral oil enema 133 milliLiter(s) Rectal daily  pantoprazole   Suspension 40 milliGRAM(s) Oral daily  phenylephrine    Infusion 0.5 MICROgram(s)/kG/Min IV Continuous <Continuous>  polyethylene glycol 3350 17 Gram(s) Oral daily  predniSONE   Tablet 30 milliGRAM(s) Oral daily  senna 2 Tablet(s) Oral at bedtime  sodium chloride 0.9% lock flush 10 milliLiter(s) IV Push every 1 hour PRN    Drug Dosing Weight  Height (cm): 167.6 (14 Mar 2021 12:03)  Weight (kg): 83.869 (14 Mar 2021 12:03)  BMI (kg/m2): 29.9 (14 Mar 2021 12:03)  BSA (m2): 1.93 (14 Mar 2021 12:03)    CENTRAL LINE: [ ] YES [ ] NO  LOCATION:   DATE INSERTED:  REMOVE: [ ] YES [ ] NO  EXPLAIN:    RIVERA: [ ] YES [ ] NO    DATE INSERTED:  REMOVE:  [ ] YES [ ] NO  EXPLAIN:    A-LINE:  [ ] YES [ ] NO  LOCATION:   DATE INSERTED:  REMOVE:  [ ] YES [ ] NO  EXPLAIN:    PMH -reviewed admission note, no change since admission    ICU Vital Signs Last 24 Hrs  T(C): 37.3 (09 Apr 2021 23:00), Max: 37.9 (09 Apr 2021 19:34)  T(F): 99.1 (09 Apr 2021 23:00), Max: 100.2 (09 Apr 2021 19:34)  HR: 71 (10 Apr 2021 00:24) (71 - 120)  BP: 73/48 (09 Apr 2021 22:00) (73/48 - 152/78)  BP(mean): 54 (09 Apr 2021 22:00) (54 - 95)  ABP: 102/55 (09 Apr 2021 23:00) (84/46 - 191/93)  ABP(mean): 68 (09 Apr 2021 23:00) (56 - 124)  RR: 30 (09 Apr 2021 23:00) (12 - 34)  SpO2: 92% (10 Apr 2021 00:24) (85% - 100%)      ABG - ( 09 Apr 2021 04:54 )  pH, Arterial: 7.44  pH, Blood: x     /  pCO2: 81    /  pO2: 71    / HCO3: 55    / Base Excess: 25.3  /  SaO2: 98                    04-08 @ 07:01  -  04-09 @ 07:00  --------------------------------------------------------  IN: 2607.4 mL / OUT: 3260 mL / NET: -652.6 mL        Mode: AC/ CMV (Assist Control/ Continuous Mandatory Ventilation)  RR (machine): 30  FiO2: 70  PEEP: 10  ITime: 0.8  MAP: 18  PC: 20  PIP: 31      PHYSICAL EXAM:    GGENERAL: sedated and intubated   HEAD:  Atraumatic, Normocephalic  EYES:  PERRLA, conjunctiva and sclera clear  MOUTH : ETT  NECK: Supple,  No JVD; Normal thyroid; Trachea midline [facility]  NERVOUS SYSTEM:  sedated  CHEST/LUNG: No chest deformity; Normal percussion bilaterally; No rhonchi, wheezing   HEART: Regular rate and rhythm; No murmurs, rubs, or gallops  ABDOMEN: Soft, Nontender, Nondistended; Bowel sounds present  EXTREMITIES:  b/l upper extremities edema +2,  no edema on lower legs. No clubbing, cyanosis   LYMPH: No lymphadenopathy noted  SKIN: No rashes  Good capillary refill    LABS:  CBC Full  -  ( 09 Apr 2021 04:38 )  WBC Count : 8.94 K/uL  RBC Count : 2.96 M/uL  Hemoglobin : 9.5 g/dL  Hematocrit : 30.2 %  Platelet Count - Automated : 182 K/uL  Mean Cell Volume : 102.0 fl  Mean Cell Hemoglobin : 32.1 pg  Mean Cell Hemoglobin Concentration : 31.5 gm/dL  Auto Neutrophil # : 7.24 K/uL  Auto Lymphocyte # : 1.13 K/uL  Auto Monocyte # : 0.13 K/uL  Auto Eosinophil # : 0.13 K/uL  Auto Basophil # : 0.02 K/uL  Auto Neutrophil % : 81.0 %  Auto Lymphocyte % : 12.6 %  Auto Monocyte % : 1.5 %  Auto Eosinophil % : 1.5 %  Auto Basophil % : 0.2 %    04-09    139  |  93<L>  |  24<H>  ----------------------------<  95  3.9   |  >45<HH>  |  0.41<L>    Ca    7.6<L>      09 Apr 2021 23:12  Phos  3.3     04-09  Mg     2.1     04-09    TPro  4.7<L>  /  Alb  1.3<L>  /  TBili  0.3  /  DBili  x   /  AST  36  /  ALT  49  /  AlkPhos  94  04-09            RADIOLOGY & ADDITIONAL STUDIES REVIEWED:  ***    GOALS OF CARE DISCUSSION WITH PATIENT/FAMILY/PROXY:    CRITICAL CARE TIME SPENT: 35 minutes INTERVAL HPI/OVERNIGHT EVENTS: Patient remained sedated and intubated. Changed Ativan from PO to IV.     PRESSORS: [ ] YES [ ] NO  WHICH:    ANTIBIOTICS:                      Antimicrobial:    Cardiovascular:  furosemide   Injectable 40 milliGRAM(s) IV Push two times a day  furosemide   Injectable 40 milliGRAM(s) IV Push daily  hydrochlorothiazide 50 milliGRAM(s) Oral daily  phenylephrine    Infusion 0.5 MICROgram(s)/kG/Min IV Continuous <Continuous>    Pulmonary:  ALBUTerol    90 MICROgram(s) HFA Inhaler 2 Puff(s) Inhalation every 6 hours    Hematalogic:  aspirin  chewable 81 milliGRAM(s) Oral daily    Other:  acetaminophen    Suspension .. 650 milliGRAM(s) Oral every 6 hours PRN  albumin human 25% IVPB 50 milliLiter(s) IV Intermittent every 6 hours  ascorbic acid 1000 milliGRAM(s) Oral daily  bisacodyl Suppository 10 milliGRAM(s) Rectal daily  chlorhexidine 2% Cloths 1 Application(s) Topical <User Schedule>  dexMEDEtomidine Infusion 0.2 MICROgram(s)/kG/Hr IV Continuous <Continuous>  fentaNYL   Infusion. 5 MICROgram(s)/kG/Hr IV Continuous <Continuous>  insulin lispro (ADMELOG) corrective regimen sliding scale   SubCutaneous every 6 hours  lactulose Syrup 20 Gram(s) Oral two times a day  LORazepam     Tablet 2 milliGRAM(s) Oral two times a day  midazolam Infusion 0.02 mG/kG/Hr IV Continuous <Continuous>  mineral oil enema 133 milliLiter(s) Rectal daily  pantoprazole   Suspension 40 milliGRAM(s) Oral daily  polyethylene glycol 3350 17 Gram(s) Oral daily  predniSONE   Tablet 30 milliGRAM(s) Oral daily  senna 2 Tablet(s) Oral at bedtime  sodium chloride 0.9% lock flush 10 milliLiter(s) IV Push every 1 hour PRN    acetaminophen    Suspension .. 650 milliGRAM(s) Oral every 6 hours PRN  albumin human 25% IVPB 50 milliLiter(s) IV Intermittent every 6 hours  ALBUTerol    90 MICROgram(s) HFA Inhaler 2 Puff(s) Inhalation every 6 hours  ascorbic acid 1000 milliGRAM(s) Oral daily  aspirin  chewable 81 milliGRAM(s) Oral daily  bisacodyl Suppository 10 milliGRAM(s) Rectal daily  chlorhexidine 2% Cloths 1 Application(s) Topical <User Schedule>  dexMEDEtomidine Infusion 0.2 MICROgram(s)/kG/Hr IV Continuous <Continuous>  fentaNYL   Infusion. 5 MICROgram(s)/kG/Hr IV Continuous <Continuous>  furosemide   Injectable 40 milliGRAM(s) IV Push two times a day  furosemide   Injectable 40 milliGRAM(s) IV Push daily  hydrochlorothiazide 50 milliGRAM(s) Oral daily  insulin lispro (ADMELOG) corrective regimen sliding scale   SubCutaneous every 6 hours  lactulose Syrup 20 Gram(s) Oral two times a day  LORazepam     Tablet 2 milliGRAM(s) Oral two times a day  midazolam Infusion 0.02 mG/kG/Hr IV Continuous <Continuous>  mineral oil enema 133 milliLiter(s) Rectal daily  pantoprazole   Suspension 40 milliGRAM(s) Oral daily  phenylephrine    Infusion 0.5 MICROgram(s)/kG/Min IV Continuous <Continuous>  polyethylene glycol 3350 17 Gram(s) Oral daily  predniSONE   Tablet 30 milliGRAM(s) Oral daily  senna 2 Tablet(s) Oral at bedtime  sodium chloride 0.9% lock flush 10 milliLiter(s) IV Push every 1 hour PRN    Drug Dosing Weight  Height (cm): 167.6 (14 Mar 2021 12:03)  Weight (kg): 83.869 (14 Mar 2021 12:03)  BMI (kg/m2): 29.9 (14 Mar 2021 12:03)  BSA (m2): 1.93 (14 Mar 2021 12:03)    CENTRAL LINE: [ ] YES [ ] NO  LOCATION:   DATE INSERTED:  REMOVE: [ ] YES [ ] NO  EXPLAIN:    RIVERA: [ ] YES [ ] NO    DATE INSERTED:  REMOVE:  [ ] YES [ ] NO  EXPLAIN:    A-LINE:  [ ] YES [ ] NO  LOCATION:   DATE INSERTED:  REMOVE:  [ ] YES [ ] NO  EXPLAIN:    PMH -reviewed admission note, no change since admission    ICU Vital Signs Last 24 Hrs  T(C): 37.3 (09 Apr 2021 23:00), Max: 37.9 (09 Apr 2021 19:34)  T(F): 99.1 (09 Apr 2021 23:00), Max: 100.2 (09 Apr 2021 19:34)  HR: 71 (10 Apr 2021 00:24) (71 - 120)  BP: 73/48 (09 Apr 2021 22:00) (73/48 - 152/78)  BP(mean): 54 (09 Apr 2021 22:00) (54 - 95)  ABP: 102/55 (09 Apr 2021 23:00) (84/46 - 191/93)  ABP(mean): 68 (09 Apr 2021 23:00) (56 - 124)  RR: 30 (09 Apr 2021 23:00) (12 - 34)  SpO2: 92% (10 Apr 2021 00:24) (85% - 100%)      ABG - ( 09 Apr 2021 04:54 )  pH, Arterial: 7.44  pH, Blood: x     /  pCO2: 81    /  pO2: 71    / HCO3: 55    / Base Excess: 25.3  /  SaO2: 98                    04-08 @ 07:01  -  04-09 @ 07:00  --------------------------------------------------------  IN: 2607.4 mL / OUT: 3260 mL / NET: -652.6 mL        Mode: AC/ CMV (Assist Control/ Continuous Mandatory Ventilation)  RR (machine): 30  FiO2: 70  PEEP: 10  ITime: 0.8  MAP: 18  PC: 20  PIP: 31      PHYSICAL EXAM:    GGENERAL: sedated and intubated   HEAD:  Atraumatic, Normocephalic  EYES:  PERRLA, conjunctiva and sclera clear  MOUTH : ETT  NECK: Supple,  No JVD; Normal thyroid; Trachea midline [facility]  NERVOUS SYSTEM:  sedated  CHEST/LUNG: No chest deformity; Normal percussion bilaterally; No rhonchi, wheezing   HEART: Regular rate and rhythm; No murmurs, rubs, or gallops  ABDOMEN: Soft, Nontender, Nondistended; Bowel sounds present  EXTREMITIES:  b/l upper extremities edema +2,  no edema on lower legs. No clubbing, cyanosis   LYMPH: No lymphadenopathy noted  SKIN: No rashes  Good capillary refill    LABS:  CBC Full  -  ( 09 Apr 2021 04:38 )  WBC Count : 8.94 K/uL  RBC Count : 2.96 M/uL  Hemoglobin : 9.5 g/dL  Hematocrit : 30.2 %  Platelet Count - Automated : 182 K/uL  Mean Cell Volume : 102.0 fl  Mean Cell Hemoglobin : 32.1 pg  Mean Cell Hemoglobin Concentration : 31.5 gm/dL  Auto Neutrophil # : 7.24 K/uL  Auto Lymphocyte # : 1.13 K/uL  Auto Monocyte # : 0.13 K/uL  Auto Eosinophil # : 0.13 K/uL  Auto Basophil # : 0.02 K/uL  Auto Neutrophil % : 81.0 %  Auto Lymphocyte % : 12.6 %  Auto Monocyte % : 1.5 %  Auto Eosinophil % : 1.5 %  Auto Basophil % : 0.2 %    04-09    139  |  93<L>  |  24<H>  ----------------------------<  95  3.9   |  >45<HH>  |  0.41<L>    Ca    7.6<L>      09 Apr 2021 23:12  Phos  3.3     04-09  Mg     2.1     04-09    TPro  4.7<L>  /  Alb  1.3<L>  /  TBili  0.3  /  DBili  x   /  AST  36  /  ALT  49  /  AlkPhos  94  04-09            RADIOLOGY & ADDITIONAL STUDIES REVIEWED:  ***    GOALS OF CARE DISCUSSION WITH PATIENT/FAMILY/PROXY:    CRITICAL CARE TIME SPENT: 35 minutes INTERVAL HPI/OVERNIGHT EVENTS: Patient remained sedated and intubated. Changed Ativan from PO to IV. Discontinued midazolam. Discontinued pressors. Added prop in afternoon for vent asynchrony. V: R 30 PEEP 10 FiO2 70%. CXR with new questionable infiltrate will send PCT and watch for fevers. Large bowel movements. Stopped dulcolax and mineral oil.    PRESSORS: [ ] YES [x ] NO OFF pressors  WHICH:    ANTIBIOTICS:                      Antimicrobial:    Cardiovascular:  furosemide   Injectable 40 milliGRAM(s) IV Push two times a day  furosemide   Injectable 40 milliGRAM(s) IV Push daily  hydrochlorothiazide 50 milliGRAM(s) Oral daily  phenylephrine    Infusion 0.5 MICROgram(s)/kG/Min IV Continuous <Continuous>    Pulmonary:  ALBUTerol    90 MICROgram(s) HFA Inhaler 2 Puff(s) Inhalation every 6 hours    Hematalogic:  aspirin  chewable 81 milliGRAM(s) Oral daily    Other:  acetaminophen    Suspension .. 650 milliGRAM(s) Oral every 6 hours PRN  albumin human 25% IVPB 50 milliLiter(s) IV Intermittent every 6 hours  ascorbic acid 1000 milliGRAM(s) Oral daily  bisacodyl Suppository 10 milliGRAM(s) Rectal daily  chlorhexidine 2% Cloths 1 Application(s) Topical <User Schedule>  dexMEDEtomidine Infusion 0.2 MICROgram(s)/kG/Hr IV Continuous <Continuous>  fentaNYL   Infusion. 5 MICROgram(s)/kG/Hr IV Continuous <Continuous>  insulin lispro (ADMELOG) corrective regimen sliding scale   SubCutaneous every 6 hours  lactulose Syrup 20 Gram(s) Oral two times a day  LORazepam     Tablet 2 milliGRAM(s) Oral two times a day  midazolam Infusion 0.02 mG/kG/Hr IV Continuous <Continuous>  mineral oil enema 133 milliLiter(s) Rectal daily  pantoprazole   Suspension 40 milliGRAM(s) Oral daily  polyethylene glycol 3350 17 Gram(s) Oral daily  predniSONE   Tablet 30 milliGRAM(s) Oral daily  senna 2 Tablet(s) Oral at bedtime  sodium chloride 0.9% lock flush 10 milliLiter(s) IV Push every 1 hour PRN    acetaminophen    Suspension .. 650 milliGRAM(s) Oral every 6 hours PRN  albumin human 25% IVPB 50 milliLiter(s) IV Intermittent every 6 hours  ALBUTerol    90 MICROgram(s) HFA Inhaler 2 Puff(s) Inhalation every 6 hours  ascorbic acid 1000 milliGRAM(s) Oral daily  aspirin  chewable 81 milliGRAM(s) Oral daily  bisacodyl Suppository 10 milliGRAM(s) Rectal daily  chlorhexidine 2% Cloths 1 Application(s) Topical <User Schedule>  dexMEDEtomidine Infusion 0.2 MICROgram(s)/kG/Hr IV Continuous <Continuous>  fentaNYL   Infusion. 5 MICROgram(s)/kG/Hr IV Continuous <Continuous>  furosemide   Injectable 40 milliGRAM(s) IV Push two times a day  furosemide   Injectable 40 milliGRAM(s) IV Push daily  hydrochlorothiazide 50 milliGRAM(s) Oral daily  insulin lispro (ADMELOG) corrective regimen sliding scale   SubCutaneous every 6 hours  lactulose Syrup 20 Gram(s) Oral two times a day  LORazepam     Tablet 2 milliGRAM(s) Oral two times a day  midazolam Infusion 0.02 mG/kG/Hr IV Continuous <Continuous>  mineral oil enema 133 milliLiter(s) Rectal daily  pantoprazole   Suspension 40 milliGRAM(s) Oral daily  phenylephrine    Infusion 0.5 MICROgram(s)/kG/Min IV Continuous <Continuous>  polyethylene glycol 3350 17 Gram(s) Oral daily  predniSONE   Tablet 30 milliGRAM(s) Oral daily  senna 2 Tablet(s) Oral at bedtime  sodium chloride 0.9% lock flush 10 milliLiter(s) IV Push every 1 hour PRN    Drug Dosing Weight  Height (cm): 167.6 (14 Mar 2021 12:03)  Weight (kg): 83.869 (14 Mar 2021 12:03)  BMI (kg/m2): 29.9 (14 Mar 2021 12:03)  BSA (m2): 1.93 (14 Mar 2021 12:03)      CENTRAL LINE: [x ] YES [ ] NO  LOCATION:  J DATE INSERTED: 4/6  REMOVE: [ ] YES [ ] NO  EXPLAIN:    RIVERA: [x ] YES [ ] NO    DATE INSERTED:  REMOVE:  [ ] YES [ ] NO  EXPLAIN:    A-LINE:  [x ] YES [ ] NO  LOCATION: right arm  DATE INSERTED: 3/29  REMOVE:  [ ] YES [ ] NO  EXPLAIN:    PMH -reviewed admission note, no change since admission    ICU Vital Signs Last 24 Hrs  T(C): 37.3 (09 Apr 2021 23:00), Max: 37.9 (09 Apr 2021 19:34)  T(F): 99.1 (09 Apr 2021 23:00), Max: 100.2 (09 Apr 2021 19:34)  HR: 71 (10 Apr 2021 00:24) (71 - 120)  BP: 73/48 (09 Apr 2021 22:00) (73/48 - 152/78)  BP(mean): 54 (09 Apr 2021 22:00) (54 - 95)  ABP: 102/55 (09 Apr 2021 23:00) (84/46 - 191/93)  ABP(mean): 68 (09 Apr 2021 23:00) (56 - 124)  RR: 30 (09 Apr 2021 23:00) (12 - 34)  SpO2: 92% (10 Apr 2021 00:24) (85% - 100%)      ABG - ( 09 Apr 2021 04:54 )  pH, Arterial: 7.44  pH, Blood: x     /  pCO2: 81    /  pO2: 71    / HCO3: 55    / Base Excess: 25.3  /  SaO2: 98        04-08 @ 07:01  -  04-09 @ 07:00  --------------------------------------------------------  IN: 2607.4 mL / OUT: 3260 mL / NET: -652.6 mL    Mode: AC/ CMV (Assist Control/ Continuous Mandatory Ventilation)  RR (machine): 30  FiO2: 70  PEEP: 10  ITime: 0.8  MAP: 18  PC: 20  PIP: 31      PHYSICAL EXAM:  GENERAL: sedated and intubated   HEAD:  Atraumatic, Normocephalic  EYES:  PERRLA, conjunctiva and sclera clear  MOUTH : ETT  NECK: Supple,  No JVD; Normal thyroid; Trachea midline LIJ  NERVOUS SYSTEM:  sedated  CHEST/LUNG: No chest deformity; Normal percussion bilaterally; No rhonchi, wheezing   HEART: Regular rate and rhythm; No murmurs, rubs, or gallops  ABDOMEN: Soft, Nontender, Nondistended; Bowel sounds present  EXTREMITIES:  b/l upper extremities edema +2,  no edema on lower legs. No clubbing, cyanosis   LYMPH: No lymphadenopathy noted  SKIN: No rashes  Good capillary refill    LABS:  CBC Full  -  ( 09 Apr 2021 04:38 )  WBC Count : 8.94 K/uL  RBC Count : 2.96 M/uL  Hemoglobin : 9.5 g/dL  Hematocrit : 30.2 %  Platelet Count - Automated : 182 K/uL  Mean Cell Volume : 102.0 fl  Mean Cell Hemoglobin : 32.1 pg  Mean Cell Hemoglobin Concentration : 31.5 gm/dL  Auto Neutrophil # : 7.24 K/uL  Auto Lymphocyte # : 1.13 K/uL  Auto Monocyte # : 0.13 K/uL  Auto Eosinophil # : 0.13 K/uL  Auto Basophil # : 0.02 K/uL  Auto Neutrophil % : 81.0 %  Auto Lymphocyte % : 12.6 %  Auto Monocyte % : 1.5 %  Auto Eosinophil % : 1.5 %  Auto Basophil % : 0.2 %    04-09    139  |  93<L>  |  24<H>  ----------------------------<  95  3.9   |  >45<HH>  |  0.41<L>    Ca    7.6<L>      09 Apr 2021 23:12  Phos  3.3     04-09  Mg     2.1     04-09    TPro  4.7<L>  /  Alb  1.3<L>  /  TBili  0.3  /  DBili  x   /  AST  36  /  ALT  49  /  AlkPhos  94  04-09    RADIOLOGY & ADDITIONAL STUDIES REVIEWED:    CXR 4/10      GOALS OF CARE DISCUSSION WITH PATIENT/FAMILY/PROXY:    CRITICAL CARE TIME SPENT: 35 minutes INTERVAL HPI/OVERNIGHT EVENTS: Patient remained sedated and intubated. Changed Ativan from PO to IV. Discontinued midazolam. Discontinued pressors. Added prop in afternoon for vent asynchrony. V: R 30 PEEP 10 FiO2 70%. CXR with new questionable infiltrate will send PCT and watch for fevers. Large bowel movements. Stopped dulcolax and mineral oil. Started Diamox 250 IVP q12 for 2 days.    PRESSORS: [ ] YES [x ] NO OFF pressors  WHICH:    ANTIBIOTICS:                      Antimicrobial:    Cardiovascular:  furosemide   Injectable 40 milliGRAM(s) IV Push two times a day  furosemide   Injectable 40 milliGRAM(s) IV Push daily  hydrochlorothiazide 50 milliGRAM(s) Oral daily  phenylephrine    Infusion 0.5 MICROgram(s)/kG/Min IV Continuous <Continuous>    Pulmonary:  ALBUTerol    90 MICROgram(s) HFA Inhaler 2 Puff(s) Inhalation every 6 hours    Hematalogic:  aspirin  chewable 81 milliGRAM(s) Oral daily    Other:  acetaminophen    Suspension .. 650 milliGRAM(s) Oral every 6 hours PRN  albumin human 25% IVPB 50 milliLiter(s) IV Intermittent every 6 hours  ascorbic acid 1000 milliGRAM(s) Oral daily  bisacodyl Suppository 10 milliGRAM(s) Rectal daily  chlorhexidine 2% Cloths 1 Application(s) Topical <User Schedule>  dexMEDEtomidine Infusion 0.2 MICROgram(s)/kG/Hr IV Continuous <Continuous>  fentaNYL   Infusion. 5 MICROgram(s)/kG/Hr IV Continuous <Continuous>  insulin lispro (ADMELOG) corrective regimen sliding scale   SubCutaneous every 6 hours  lactulose Syrup 20 Gram(s) Oral two times a day  LORazepam     Tablet 2 milliGRAM(s) Oral two times a day  midazolam Infusion 0.02 mG/kG/Hr IV Continuous <Continuous>  mineral oil enema 133 milliLiter(s) Rectal daily  pantoprazole   Suspension 40 milliGRAM(s) Oral daily  polyethylene glycol 3350 17 Gram(s) Oral daily  predniSONE   Tablet 30 milliGRAM(s) Oral daily  senna 2 Tablet(s) Oral at bedtime  sodium chloride 0.9% lock flush 10 milliLiter(s) IV Push every 1 hour PRN    acetaminophen    Suspension .. 650 milliGRAM(s) Oral every 6 hours PRN  albumin human 25% IVPB 50 milliLiter(s) IV Intermittent every 6 hours  ALBUTerol    90 MICROgram(s) HFA Inhaler 2 Puff(s) Inhalation every 6 hours  ascorbic acid 1000 milliGRAM(s) Oral daily  aspirin  chewable 81 milliGRAM(s) Oral daily  bisacodyl Suppository 10 milliGRAM(s) Rectal daily  chlorhexidine 2% Cloths 1 Application(s) Topical <User Schedule>  dexMEDEtomidine Infusion 0.2 MICROgram(s)/kG/Hr IV Continuous <Continuous>  fentaNYL   Infusion. 5 MICROgram(s)/kG/Hr IV Continuous <Continuous>  furosemide   Injectable 40 milliGRAM(s) IV Push two times a day  furosemide   Injectable 40 milliGRAM(s) IV Push daily  hydrochlorothiazide 50 milliGRAM(s) Oral daily  insulin lispro (ADMELOG) corrective regimen sliding scale   SubCutaneous every 6 hours  lactulose Syrup 20 Gram(s) Oral two times a day  LORazepam     Tablet 2 milliGRAM(s) Oral two times a day  midazolam Infusion 0.02 mG/kG/Hr IV Continuous <Continuous>  mineral oil enema 133 milliLiter(s) Rectal daily  pantoprazole   Suspension 40 milliGRAM(s) Oral daily  phenylephrine    Infusion 0.5 MICROgram(s)/kG/Min IV Continuous <Continuous>  polyethylene glycol 3350 17 Gram(s) Oral daily  predniSONE   Tablet 30 milliGRAM(s) Oral daily  senna 2 Tablet(s) Oral at bedtime  sodium chloride 0.9% lock flush 10 milliLiter(s) IV Push every 1 hour PRN    Drug Dosing Weight  Height (cm): 167.6 (14 Mar 2021 12:03)  Weight (kg): 83.869 (14 Mar 2021 12:03)  BMI (kg/m2): 29.9 (14 Mar 2021 12:03)  BSA (m2): 1.93 (14 Mar 2021 12:03)      CENTRAL LINE: [x ] YES [ ] NO  LOCATION:  [facility] DATE INSERTED: 4/6  REMOVE: [ ] YES [ ] NO  EXPLAIN:    RIVERA: [x ] YES [ ] NO    DATE INSERTED:  REMOVE:  [ ] YES [ ] NO  EXPLAIN:    A-LINE:  [x ] YES [ ] NO  LOCATION: right arm  DATE INSERTED: 3/29  REMOVE:  [ ] YES [ ] NO  EXPLAIN:    PMH -reviewed admission note, no change since admission    ICU Vital Signs Last 24 Hrs  T(C): 37.3 (09 Apr 2021 23:00), Max: 37.9 (09 Apr 2021 19:34)  T(F): 99.1 (09 Apr 2021 23:00), Max: 100.2 (09 Apr 2021 19:34)  HR: 71 (10 Apr 2021 00:24) (71 - 120)  BP: 73/48 (09 Apr 2021 22:00) (73/48 - 152/78)  BP(mean): 54 (09 Apr 2021 22:00) (54 - 95)  ABP: 102/55 (09 Apr 2021 23:00) (84/46 - 191/93)  ABP(mean): 68 (09 Apr 2021 23:00) (56 - 124)  RR: 30 (09 Apr 2021 23:00) (12 - 34)  SpO2: 92% (10 Apr 2021 00:24) (85% - 100%)      ABG - ( 09 Apr 2021 04:54 )  pH, Arterial: 7.44  pH, Blood: x     /  pCO2: 81    /  pO2: 71    / HCO3: 55    / Base Excess: 25.3  /  SaO2: 98        04-08 @ 07:01  -  04-09 @ 07:00  --------------------------------------------------------  IN: 2607.4 mL / OUT: 3260 mL / NET: -652.6 mL    Mode: AC/ CMV (Assist Control/ Continuous Mandatory Ventilation)  RR (machine): 30  FiO2: 70  PEEP: 10  ITime: 0.8  MAP: 18  PC: 20  PIP: 31      PHYSICAL EXAM:  GENERAL: sedated and intubated   HEAD:  Atraumatic, Normocephalic  EYES:  PERRLA, conjunctiva and sclera clear  MOUTH : ETT  NECK: Supple,  No JVD; Normal thyroid; Trachea midline [facility]  NERVOUS SYSTEM:  sedated  CHEST/LUNG: No chest deformity; Normal percussion bilaterally; No rhonchi, wheezing   HEART: Regular rate and rhythm; No murmurs, rubs, or gallops  ABDOMEN: Soft, Nontender, Nondistended; Bowel sounds present  EXTREMITIES:  b/l upper extremities edema +2,  no edema on lower legs. No clubbing, cyanosis   LYMPH: No lymphadenopathy noted  SKIN: No rashes  Good capillary refill    LABS:  CBC Full  -  ( 09 Apr 2021 04:38 )  WBC Count : 8.94 K/uL  RBC Count : 2.96 M/uL  Hemoglobin : 9.5 g/dL  Hematocrit : 30.2 %  Platelet Count - Automated : 182 K/uL  Mean Cell Volume : 102.0 fl  Mean Cell Hemoglobin : 32.1 pg  Mean Cell Hemoglobin Concentration : 31.5 gm/dL  Auto Neutrophil # : 7.24 K/uL  Auto Lymphocyte # : 1.13 K/uL  Auto Monocyte # : 0.13 K/uL  Auto Eosinophil # : 0.13 K/uL  Auto Basophil # : 0.02 K/uL  Auto Neutrophil % : 81.0 %  Auto Lymphocyte % : 12.6 %  Auto Monocyte % : 1.5 %  Auto Eosinophil % : 1.5 %  Auto Basophil % : 0.2 %    04-09    139  |  93<L>  |  24<H>  ----------------------------<  95  3.9   |  >45<HH>  |  0.41<L>    Ca    7.6<L>      09 Apr 2021 23:12  Phos  3.3     04-09  Mg     2.1     04-09    TPro  4.7<L>  /  Alb  1.3<L>  /  TBili  0.3  /  DBili  x   /  AST  36  /  ALT  49  /  AlkPhos  94  04-09    RADIOLOGY & ADDITIONAL STUDIES REVIEWED:    CXR 4/10      GOALS OF CARE DISCUSSION WITH PATIENT/FAMILY/PROXY:    CRITICAL CARE TIME SPENT: 35 minutes

## 2021-04-10 NOTE — PROGRESS NOTE ADULT - SUBJECTIVE AND OBJECTIVE BOX
Patient is a 55y old  Male who presents with a chief complaint of SOB (10 Apr 2021 06:38)  Patient is intubated, sedated on mechanical ventilation. Off pressors meds. Left sided chest tube    INTERVAL HPI/OVERNIGHT EVENTS:      VITAL SIGNS:  T(F): 100.4 (04-10-21 @ 04:00)  HR: 70 (04-10-21 @ 10:00)  BP: 102/54 (04-10-21 @ 08:00)  RR: 25 (04-10-21 @ 10:00)  SpO2: 97% (04-10-21 @ 10:00)  Wt(kg): --  I&O's Detail    09 Apr 2021 07:01  -  10 Apr 2021 07:00  --------------------------------------------------------  IN:    Albumin 25%  -  50 mL: 200 mL    Dexmedetomidine: 456.6 mL    Enteral Tube Flush: 450 mL    FentaNYL: 34 mL    FentaNYL: 948.1 mL    Glucerna 1.5: 600 mL    IV PiggyBack: 499.8 mL    IV PiggyBack: 100 mL    Midazolam: 108.2 mL    Propofol: 6 mL  Total IN: 3402.7 mL    OUT:    Chest Tube (mL): 18 mL    Indwelling Catheter - Urethral (mL): 3630 mL    Phenylephrine: 0 mL  Total OUT: 3648 mL    Total NET: -245.3 mL      10 Apr 2021 07:01  -  10 Apr 2021 12:30  --------------------------------------------------------  IN:    FentaNYL: 136 mL    Glucerna 1.5: 100 mL  Total IN: 236 mL    OUT:    Indwelling Catheter - Urethral (mL): 420 mL  Total OUT: 420 mL    Total NET: -184 mL        Mode: AC/ CMV (Assist Control/ Continuous Mandatory Ventilation)  RR (machine): 30  FiO2: 70  PEEP: 10  ITime: 0.8  MAP: 18  PC: 20  PIP: 32        REVIEW OF SYSTEMS:    CONSTITUTIONAL:  No fevers, chills, sweats    HEENT:  Eyes:  No diplopia or blurred vision. ENT:  No earache, sore throat or runny nose.    CARDIOVASCULAR:  No pressure, squeezing, tightness, or heaviness about the chest; no palpitations.    RESPIRATORY:  Per HPI    GASTROINTESTINAL:  No abdominal pain, nausea, vomiting or diarrhea.    GENITOURINARY:  No dysuria, frequency or urgency.    NEUROLOGIC:  No paresthesias, fasciculations, seizures or weakness.    PSYCHIATRIC:  No disorder of thought or mood.      PHYSICAL EXAM:    Constitutional: Well developed and nourished  Eyes:Perrla  ENMT: normal  Neck:supple  Respiratory: good air entry  Cardiovascular: S1 S2 regular  Gastrointestinal: Soft, Non tender  Extremities: No edema  Vascular:normal  Neurological:Awake, alert,Ox3  Musculoskeletal:Normal      MEDICATIONS  (STANDING):  acetaZOLAMIDE  IVPB 250 milliGRAM(s) IV Intermittent every 12 hours  albumin human 25% IVPB 50 milliLiter(s) IV Intermittent every 6 hours  ALBUTerol    90 MICROgram(s) HFA Inhaler 2 Puff(s) Inhalation every 6 hours  ascorbic acid 1000 milliGRAM(s) Oral daily  aspirin  chewable 81 milliGRAM(s) Oral daily  chlorhexidine 2% Cloths 1 Application(s) Topical <User Schedule>  dexMEDEtomidine Infusion 1.5 MICROgram(s)/kG/Hr (31.5 mL/Hr) IV Continuous <Continuous>  fentaNYL   Infusion 4 MICROgram(s)/kG/Hr (33.5 mL/Hr) IV Continuous <Continuous>  furosemide   Injectable 40 milliGRAM(s) IV Push two times a day  hydrochlorothiazide 50 milliGRAM(s) Oral daily  insulin lispro (ADMELOG) corrective regimen sliding scale   SubCutaneous every 6 hours  LORazepam   Injectable 2 milliGRAM(s) IV Push every 12 hours  pantoprazole   Suspension 40 milliGRAM(s) Oral daily  polyethylene glycol 3350 17 Gram(s) Oral daily  predniSONE   Tablet 30 milliGRAM(s) Oral daily  senna 2 Tablet(s) Oral at bedtime    MEDICATIONS  (PRN):  acetaminophen    Suspension .. 650 milliGRAM(s) Oral every 6 hours PRN Temp greater or equal to 38C (100.4F)  sodium chloride 0.9% lock flush 10 milliLiter(s) IV Push every 1 hour PRN Pre/post blood products, medications, blood draw, and to maintain line patency      Allergies    No Known Allergies    Intolerances        LABS:                        9.1    10.18 )-----------( 184      ( 10 Apr 2021 03:55 )             28.8     04-10    140  |  95<L>  |  22<H>  ----------------------------<  99  3.7   |  44<H>  |  0.27<L>    Ca    8.2<L>      10 Apr 2021 03:55  Phos  2.1     04-10  Mg     2.0     04-10    TPro  5.4<L>  /  Alb  2.4<L>  /  TBili  0.9  /  DBili  x   /  AST  42<H>  /  ALT  53  /  AlkPhos  70  04-10        ABG - ( 10 Apr 2021 03:55 )  pH, Arterial: 7.48  pH, Blood: x     /  pCO2: 69    /  pO2: 62    / HCO3: 51    / Base Excess: 23.3  /  SaO2: 94                    CAPILLARY BLOOD GLUCOSE      POCT Blood Glucose.: 156 mg/dL (10 Apr 2021 11:14)  POCT Blood Glucose.: 98 mg/dL (10 Apr 2021 05:40)  POCT Blood Glucose.: 102 mg/dL (09 Apr 2021 23:10)  POCT Blood Glucose.: 125 mg/dL (09 Apr 2021 16:22)    pro-bnp -- 04-09 @ 04:38     d-dimer 507  04-09 @ 04:38  pro-bnp -- 04-06 @ 05:03     d-dimer 363  04-06 @ 05:03      RADIOLOGY & ADDITIONAL TESTS:    CXR:  < from: Xray Chest 1 View- PORTABLE-Urgent (Xray Chest 1 View- PORTABLE-Urgent .) (04.09.21 @ 10:53) >  IMPRESSION:   Increasing of LEFT lung pneumothorax/30% without midline shift. Otherwise no change..    A FOLLOW-UP 4/8/2021 6:24 PM CHEST RADIOGRAPH shows insertion of LEFT chest tube with reexpansion of LEFT lung. Otherwise no change.    FOLLOW-UP AP PORTABLE CHEST RADIOGRAPH 4/9/2021 9:17 AM:  No interval change. LEFT chest tube in place. No pneumothorax.  Diffuse airspace disease. Catheters and tubes in place.          < end of copied text >    Ct scan chest:    ekg;    echo:

## 2021-04-10 NOTE — PROGRESS NOTE ADULT - SUBJECTIVE AND OBJECTIVE BOX
Patient is a 55y old  Male who presents with a chief complaint of SOB (10 Apr 2021 00:49)    pt seen in icu [ x ], reg med floor [   ], bed [ x ], chair at bedside [   ], a+o x3 [  ], sedated [x  ],  nad [x  ]    andrea [ x ], ngt feed [x  ], et tube [ x ], cent line [x  ],        Allergies    No Known Allergies        Vitals    T(F): 100.4 (04-10-21 @ 04:00), Max: 100.4 (04-10-21 @ 04:00)  HR: 73 (04-10-21 @ 06:00) (71 - 120)  BP: 105/55 (04-10-21 @ 06:00) (73/48 - 152/78)  RR: 30 (04-10-21 @ 06:00) (12 - 34)  SpO2: 94% (04-10-21 @ 06:00) (85% - 100%)  Wt(kg): --  CAPILLARY BLOOD GLUCOSE      POCT Blood Glucose.: 98 mg/dL (10 Apr 2021 05:40)      Labs                          9.1    10.18 )-----------( 184      ( 10 Apr 2021 03:55 )             28.8       04-10    140  |  95<L>  |  22<H>  ----------------------------<  99  3.7   |  44<H>  |  0.27<L>    Ca    8.2<L>      10 Apr 2021 03:55  Phos  2.1     04-10  Mg     2.0     04-10    TPro  5.4<L>  /  Alb  2.4<L>  /  TBili  0.9  /  DBili  x   /  AST  42<H>  /  ALT  53  /  AlkPhos  70  04-10            .Urine Clean Catch (Midstream)  03-15 @ 00:52   No growth  --  --          Radiology Results      Meds    MEDICATIONS  (STANDING):  albumin human 25% IVPB 50 milliLiter(s) IV Intermittent every 6 hours  ALBUTerol    90 MICROgram(s) HFA Inhaler 2 Puff(s) Inhalation every 6 hours  ascorbic acid 1000 milliGRAM(s) Oral daily  aspirin  chewable 81 milliGRAM(s) Oral daily  bisacodyl Suppository 10 milliGRAM(s) Rectal daily  chlorhexidine 2% Cloths 1 Application(s) Topical <User Schedule>  dexMEDEtomidine Infusion 1.5 MICROgram(s)/kG/Hr (31.5 mL/Hr) IV Continuous <Continuous>  fentaNYL   Infusion 4 MICROgram(s)/kG/Hr (33.5 mL/Hr) IV Continuous <Continuous>  furosemide   Injectable 40 milliGRAM(s) IV Push two times a day  hydrochlorothiazide 50 milliGRAM(s) Oral daily  insulin lispro (ADMELOG) corrective regimen sliding scale   SubCutaneous every 6 hours  lactulose Syrup 20 Gram(s) Oral two times a day  LORazepam     Tablet 2 milliGRAM(s) Oral two times a day  midazolam Infusion 0.02 mG/kG/Hr (1.68 mL/Hr) IV Continuous <Continuous>  mineral oil enema 133 milliLiter(s) Rectal daily  pantoprazole   Suspension 40 milliGRAM(s) Oral daily  polyethylene glycol 3350 17 Gram(s) Oral daily  predniSONE   Tablet 30 milliGRAM(s) Oral daily  senna 2 Tablet(s) Oral at bedtime      MEDICATIONS  (PRN):  acetaminophen    Suspension .. 650 milliGRAM(s) Oral every 6 hours PRN Temp greater or equal to 38C (100.4F)  sodium chloride 0.9% lock flush 10 milliLiter(s) IV Push every 1 hour PRN Pre/post blood products, medications, blood draw, and to maintain line patency      Physical Exam      Neuro :  no focal deficits  Respiratory: CTA B/L  CV: RRR, S1S2, no murmurs,   Abdominal: Soft, NT, ND +BS,  Extremities: No edema, + peripheral pulses      ASSESSMENT    Hypoxemia 2nd to covid pna   transaminitis  prediabetes  h/o appendectomy  cholecystectomy        PLAN    contact and airborne isolation  d/c remdesevir given covid ab positive noted   completed dexamethasone   started pulse steroids for 3 days - 250mg solumedrol bid now tapered to 30mg daily  cont asa, vit c,    cont albuterol inhaler   pulm f/u  procalcitonin, D-dimer, crp, ldh, ferritin, lactate noted ,    tmx 99.6  cont tylenol prn,   cont robitussin prn   pt on fentanyl, phenylephrine, midazolam and propofol drip  s/p intubation 3/29/21  O2 sat (83% - 97%) mech vent  O2 via mech vent and taper fio2 as tolerated   serial abg's   abg this am noted above  vent mgmt as per icu  xray 3/19/21 with pneumomediastinum  rept cxr with New trace right apical pneumothorax. New mild left apical pneumothorax. Grossly stable small pneumomediastinum.  Soft tissue emphysema at the neck bases bilaterally. Grossly stable bilateral pulmonary infiltrates noted.   cxr 2/24 with No evidence of pneumothorax can be appreciated on the available image. This may be related to patient positioning. Evidence of pneumomediastinum and subcutaneous emphysema in the lower neck is again noted. There are patchy bibasilar infiltrates and elevated right hemidiaphragm noted.   cxr 3/29/21 with No significant change bilateral infiltrates. There is a small simple left apical pneumothorax. No significant pleural effusion. Bilateral subcutaneous emphysema similar to prior.   pt intubated 6AM 3/29/21,   XRs on 7AM and 5PM with no obvious increase of ptx  cxr 4/4/21 with Improving bilateral airspace disease noted    cxr 4/8/21 with Small left pneumothorax noted above.  thoracic surg f/u   s/p L chest tube placement  F/u CXR post placement, monitor site of placement   Daily CXR  Monitor O2 status   lispro ss   prognosis poor  cont current meds       Patient is a 55y old  Male who presents with a chief complaint of SOB (10 Apr 2021 00:49)    pt seen in icu [ x ], reg med floor [   ], bed [ x ], chair at bedside [   ], a+o x3 [  ], sedated [x  ],  nad [x  ]    andrea [ x ], ngt feed [x  ], et tube [ x ], cent line [x  ],        Allergies    No Known Allergies        Vitals    T(F): 100.4 (04-10-21 @ 04:00), Max: 100.4 (04-10-21 @ 04:00)  HR: 73 (04-10-21 @ 06:00) (71 - 120)  BP: 105/55 (04-10-21 @ 06:00) (73/48 - 152/78)  RR: 30 (04-10-21 @ 06:00) (12 - 34)  SpO2: 94% (04-10-21 @ 06:00) (85% - 100%)  Wt(kg): --  CAPILLARY BLOOD GLUCOSE      POCT Blood Glucose.: 98 mg/dL (10 Apr 2021 05:40)      Labs                          9.1    10.18 )-----------( 184      ( 10 Apr 2021 03:55 )             28.8       04-10    140  |  95<L>  |  22<H>  ----------------------------<  99  3.7   |  44<H>  |  0.27<L>    Ca    8.2<L>      10 Apr 2021 03:55  Phos  2.1     04-10  Mg     2.0     04-10    TPro  5.4<L>  /  Alb  2.4<L>  /  TBili  0.9  /  DBili  x   /  AST  42<H>  /  ALT  53  /  AlkPhos  70  04-10            .Urine Clean Catch (Midstream)  03-15 @ 00:52   No growth  --  --          Radiology Results      Meds    MEDICATIONS  (STANDING):  albumin human 25% IVPB 50 milliLiter(s) IV Intermittent every 6 hours  ALBUTerol    90 MICROgram(s) HFA Inhaler 2 Puff(s) Inhalation every 6 hours  ascorbic acid 1000 milliGRAM(s) Oral daily  aspirin  chewable 81 milliGRAM(s) Oral daily  bisacodyl Suppository 10 milliGRAM(s) Rectal daily  chlorhexidine 2% Cloths 1 Application(s) Topical <User Schedule>  dexMEDEtomidine Infusion 1.5 MICROgram(s)/kG/Hr (31.5 mL/Hr) IV Continuous <Continuous>  fentaNYL   Infusion 4 MICROgram(s)/kG/Hr (33.5 mL/Hr) IV Continuous <Continuous>  furosemide   Injectable 40 milliGRAM(s) IV Push two times a day  hydrochlorothiazide 50 milliGRAM(s) Oral daily  insulin lispro (ADMELOG) corrective regimen sliding scale   SubCutaneous every 6 hours  lactulose Syrup 20 Gram(s) Oral two times a day  LORazepam     Tablet 2 milliGRAM(s) Oral two times a day  midazolam Infusion 0.02 mG/kG/Hr (1.68 mL/Hr) IV Continuous <Continuous>  mineral oil enema 133 milliLiter(s) Rectal daily  pantoprazole   Suspension 40 milliGRAM(s) Oral daily  polyethylene glycol 3350 17 Gram(s) Oral daily  predniSONE   Tablet 30 milliGRAM(s) Oral daily  senna 2 Tablet(s) Oral at bedtime      MEDICATIONS  (PRN):  acetaminophen    Suspension .. 650 milliGRAM(s) Oral every 6 hours PRN Temp greater or equal to 38C (100.4F)  sodium chloride 0.9% lock flush 10 milliLiter(s) IV Push every 1 hour PRN Pre/post blood products, medications, blood draw, and to maintain line patency      Physical Exam      Neuro :  no focal deficits  Respiratory: CTA B/L  CV: RRR, S1S2, no murmurs,   Abdominal: Soft, NT, ND +BS,  Extremities: No edema, + peripheral pulses      ASSESSMENT    Hypoxemia 2nd to covid pna   transaminitis  prediabetes  h/o appendectomy  cholecystectomy        PLAN    contact and airborne isolation  d/c remdesevir given covid ab positive noted   completed dexamethasone   started pulse steroids for 3 days - 250mg solumedrol bid now tapered to 30mg daily  cont asa, vit c,    cont albuterol inhaler   pulm f/u  procalcitonin, D-dimer, crp, ldh, ferritin, lactate noted ,    tmx 100.4  cont tylenol prn,   cont robitussin prn   pt on fentanyl, phenylephrine, midazolam and precedex drip  s/p intubation 3/29/21  O2 sat (85% - 100%) mech vent  O2 via mech vent and taper fio2 as tolerated   serial abg's   vent mgmt as per icu  xray 3/19/21 with pneumomediastinum  rept cxr with New trace right apical pneumothorax. New mild left apical pneumothorax. Grossly stable small pneumomediastinum.  Soft tissue emphysema at the neck bases bilaterally. Grossly stable bilateral pulmonary infiltrates noted.   cxr 2/24 with No evidence of pneumothorax can be appreciated on the available image. This may be related to patient positioning. Evidence of pneumomediastinum and subcutaneous emphysema in the lower neck is again noted. There are patchy bibasilar infiltrates and elevated right hemidiaphragm noted.   cxr 3/29/21 with No significant change bilateral infiltrates. There is a small simple left apical pneumothorax. No significant pleural effusion. Bilateral subcutaneous emphysema similar to prior.   pt intubated 6AM 3/29/21,   XRs on 7AM and 5PM with no obvious increase of ptx  cxr 4/4/21 with Improving bilateral airspace disease noted    cxr 4/8/21 with Small left pneumothorax noted above.  thoracic surg f/u   s/p L chest tube placement  F/u CXR post placement, monitor site of placement   Daily CXR  Monitor O2 status   lispro ss   prognosis poor  cont current meds

## 2021-04-10 NOTE — PROGRESS NOTE ADULT - ATTENDING COMMENTS
55 yr old  man , non smoker with  moody 1990s presented 3/14 with x9 days worsening cough, subjective fevers, and SOB, with x2-3 days dysuria and central, non-radiating, constant CP. Admitted to medicine unit  for acute hypoxic respiratory failure secondary to pna from covid-19 infection .     Assessment:  1. Acute hypoxic respiratory failure  2 Covid-19 infection   3. Transaminitis  4. Prediabetes  5. Bilateral pneumothorax    Plan   -pat intubated 3/29   -Mechanical ventilation with lung protective strategy   -adjust vent as per ABG  - Given Diamox given elevated Serum bicarb  -PTX  improved post chest tube placement  -diuresis and aim for negative fluid balance  -Cont. precedex and fentanyl  -Ativan for benzo dependence  -isolation : contact and air borne   -monitor biomarkers daily, trending down   - s/p pulse steroid .. solumedrol 250 bid x 3/3 days   -Tube feedings, bowel regimen  -dvt/gi prophy  -hemodynamic monitoring   - May need trach/peg if unable to extubate by early next week

## 2021-04-10 NOTE — PROGRESS NOTE ADULT - ASSESSMENT
55M, no PMH, PSH appy and moody 1990s presented 3/14 with x9 days worsening cough, subjective fevers, and SOB, with x2-3 days dysuria and central, non-radiating, constant CP. Admitted to [facility] for acute hypoxic respiratory failure s/t covid pneumonia, transferred to ICU for requiring HFNC. now intubated and sedated since 3/29. Noted pneumothorax/pneumomediastinum, chest tube placed on 4/8 by thoracic surgery.     1. Acute hypoxic respiratory failure  2. ARDS 2/2 Covid pneumonia  3. Transaminitis  4. Prediabetes  5. Abnormal TSH    =================== Neuro============================  Alert and oriented x 3 at baseline     intubated and sedated 3/29 on Vent  d/c'd profopol, continue Fentanyl and taper off Versed  continue Ativan 2mg PO to help taper off Versed       ================= Cardiovascular==========================  Hypertension  not on any pressor support, maintaining map above 60  s/p Labetalol 20mg IVP for high BP  s/p Lopressor 5mg IVP x 1 4/6  BP controlled now      TACHYCARDIA:  pT HR in 80s-90s   continue monitoring         ================- Pulm=================================  Acute hypoxic respiratory failure: secondary to covid pneumonia  ARDS  -was on  HFNr then got intubated 3/29  -PC Vent setting : 18/30/70/10  -D-dimer initially elevated on presentation, now wnl  - ABG show metabolic alkalosis pattern with elevated bicarb   - Will decrease TV   - Patient might need tracheostomy based on his current Oxygen requirements and clinical condition overall, Will speak with family and consult Thoracic surgery  - Thoracic surgery following  -pulm. dr. Ryan    -Yisselivir was discontinued due to positive antibodies   - c/w supportive care   - trend COVID  markers  - Finished Dexamethasone   -on prednisone taper    Pneumothorax and pneumomediastinum:  -X-ray chest: tiny left apical pneumothorax  -Thoracic surgery consulted   -Chest tube placed 4/8 pigtail to wall suction.   -monitor output  -X-ray monitoring daily, PTX improving     ==================ID===================================  Covid pneumonia  -leukocytosis resolved  -low grade fever  -procal elevated  -plan as above     ================= Nephro================================  -pitting edema to both lower arms  -Electrolyte imbalance :  Phos 1.7, K 3.4, replaced with K-phos, K riders x 3  -Albumin 1.3, give Albumin 25% q6h x 48hr  -keep Cerda for now.   -monitor I/Os   -Net negative , Urine output monitoring  -on Lasix 40mg BID and HCTZ 50mg   -monitor lytes, CMP    =================GI====================================  Transaminitis:   likely secondary to Covid   - and  on presentation   hepatitis panel -ve  -Improved  -continue to monitor LFT    diet:   NGT (3/29) with tube feed  bowel regimen   Last BM 4/9    ================ Heme==================================  Elevated d-dimer: likely secondary to Covid  -d-dimer 423 on presentation, now wnl  -continue prophylactic Lovenox 40 mg bid    =================Endocrine===============================  Prediabetes:    -A1c  5.8  -BS controlled  -continue HSS  -monitor FS while on steroids    Abnormal TSH:  -TSH level noted 0.26,   -repeat TSH 0.37 and Free T4 1.82      ================= Skin/Catheters============================  No rashes. Peripheral IV lines.   LIj placed 4/6    - =================Prophylaxis =============================  Lovenox for DVT proph  Protonix for  GI proph    ==================GOC==================================   FULL CODE    updated condition to family by resident  Spoke with patient/s wife Rosa and grand daughter, she wants her son to be the POC     55M, no PMH, PSH appy and moody 1990s presented 3/14 with x9 days worsening cough, subjective fevers, and SOB, with x2-3 days dysuria and central, non-radiating, constant CP. Admitted to [facility] for acute hypoxic respiratory failure s/t covid pneumonia, transferred to ICU for requiring HFNC. now intubated and sedated since 3/29. Noted pneumothorax/pneumomediastinum, chest tube placed on 4/8 by thoracic surgery.     1. Acute hypoxic respiratory failure  2. ARDS 2/2 Covid pneumonia  3. Transaminitis  4. Prediabetes  5. Abnormal TSH    =================== Neuro============================  Alert and oriented x 3 at baseline     intubated and sedated 3/29 on Vent  restarted profopol, continue Fentanyl and off midazolam  Ativan 2mg IV to help taper off Versed     ================= Cardiovascular==========================  Hypertension  not on any pressor support, maintaining map above 60  s/p Labetalol 20mg IVP for high BP  s/p Lopressor 5mg IVP x 1 4/6  BP controlled now    TACHYCARDIA:  pT HR in 80s-90s   continue monitoring     ================- Pulm=================================  Acute hypoxic respiratory failure: secondary to covid pneumonia  ARDS  -was on  HFNr then got intubated 3/29  -PC Vent setting : 18/30/70/10  -D-dimer initially elevated on presentation, now wnl  - ABG show metabolic alkalosis pattern with elevated bicarb   - Will decrease TV   - Patient might need tracheostomy based on his current Oxygen requirements and clinical condition overall, Will speak with family and consult Thoracic surgery  - Thoracic surgery following  -pulm. dr. Ryan    -Yisselivir was discontinued due to positive antibodies   - c/w supportive care   - trend COVID  markers  - Finished Dexamethasone   -on prednisone taper    Pneumothorax and pneumomediastinum:  -X-ray chest: tiny left apical pneumothorax  -Thoracic surgery consulted   -Chest tube placed 4/8 pigtail to wall suction.   -monitor output  -X-ray monitoring daily, PTX improving     ==================ID===================================  Covid pneumonia  -leukocytosis resolved  -low grade fever  -procal elevated  -plan as above     ================= Nephro================================  -pitting edema to both lower arms  -Electrolyte imbalance :  Phos 1.7, K 3.4, replaced with K-phos, K riders x 3  -Albumin 1.3, give Albumin 25% q6h x 48hr  -keep Cerda for now.   -monitor I/Os   -Net negative , Urine output monitoring  -on Lasix 40mg BID and HCTZ 50mg   -monitor lytes, CMP    =================GI====================================  Transaminitis:   likely secondary to Covid   - and  on presentation   hepatitis panel -ve  -Improved  -continue to monitor LFT    diet:   NGT (3/29) with tube feed  bowel regimen   Last BM 4/9    ================ Heme==================================  Elevated d-dimer: likely secondary to Covid  -d-dimer 423 on presentation, now wnl  -continue prophylactic Lovenox 40 mg bid    =================Endocrine===============================  Prediabetes:    -A1c  5.8  -BS controlled  -continue HSS  -monitor FS while on steroids    Abnormal TSH:  -TSH level noted 0.26,   -repeat TSH 0.37 and Free T4 1.82    ================= Skin/Catheters============================  No rashes. Peripheral IV lines.   LIj placed 4/6    - =================Prophylaxis =============================  Lovenox for DVT proph  Protonix for  GI proph    ==================GOC==================================   FULL CODE    updated condition to family by resident  Spoke with patient/s wife Rosa and grand daughter, she wants her son to be the POC     55M, no PMH, PSH appy and moody 1990s presented 3/14 with x9 days worsening cough, subjective fevers, and SOB, with x2-3 days dysuria and central, non-radiating, constant CP. Admitted to [facility] for acute hypoxic respiratory failure s/t covid pneumonia, transferred to ICU for requiring HFNC. now intubated and sedated since 3/29. Noted pneumothorax/pneumomediastinum, chest tube placed on 4/8 by thoracic surgery.     1. Acute hypoxic respiratory failure  2. ARDS 2/2 Covid pneumonia  3. Transaminitis  4. Prediabetes  5. Abnormal TSH    =================== Neuro============================  Alert and oriented x 3 at baseline   intubated and sedated 3/29 on Vent  restarted profopol, continue Fentanyl and off midazolam  Ativan 2mg IV to help taper off Versed     ================= Cardiovascular==========================  Hypertension  map above 60 goal  s/p Labetalol 20mg IVP for high BP  s/p Lopressor 5mg IVP x 1 4/6  BP controlled now    TACHYCARDIA:  HR in 80s-90s   continue monitoring     ================- Pulm=================================  Acute hypoxic respiratory failure: secondary to covid pneumonia  ARDS  -was on  HFNr then got intubated 3/29  -PC Vent setting : 18/30/70/10  -D-dimer initially elevated on presentation, now wnl  - ABG show metabolic alkalosis pattern with elevated bicarb   - Will decrease TV   - Patient might need tracheostomy based on his current Oxygen requirements and clinical condition overall, Will speak with family and consult Thoracic surgery  - Thoracic surgery following  -pulm. dr. Ryan    -Yisselivir was discontinued due to positive antibodies   - c/w supportive care   - trend COVID  markers  - Finished Dexamethasone   -on prednisone taper    Pneumothorax and pneumomediastinum:  -X-ray chest: tiny left apical pneumothorax  -Thoracic surgery consulted   -Chest tube placed 4/8 pigtail to wall suction.   -monitor output  -X-ray monitoring daily, PTX improving     ==================ID===================================  Covid pneumonia  -leukocytosis resolved  -low grade fever  -procal elevated  -plan as above     ================= Nephro================================  -pitting edema to both lower arms  -Electrolyte imbalance :  Phos 1.7, K 3.4, replaced with K-phos, K riders x 3  -Albumin 1.3, give Albumin 25% q6h x 48hr  -keep Cerda for now.   -monitor I/Os   -Net negative , Urine output monitoring  -on Lasix 40mg BID and HCTZ 50mg   - started Albumin x 2 days on 4/10  -monitor lytes, CMP    =================GI====================================  Transaminitis:   likely secondary to Covid   - and  on presentation   hepatitis panel -ve  -Improved  -continue to monitor LFT    diet:   NGT (3/29) with tube feed  bowel regimen   Last BM 4/9    ================ Heme==================================  Elevated d-dimer: likely secondary to Covid  -d-dimer 423 on presentation, now wnl  -continue prophylactic Lovenox 40 mg bid    =================Endocrine===============================  Prediabetes:    -A1c  5.8  -BS controlled  -continue HSS  -monitor FS while on steroids    Abnormal TSH:  -TSH level noted 0.26,   -repeat TSH 0.37 and Free T4 1.82    ================= Skin/Catheters============================  No rashes. Peripheral IV lines.   LIj placed 4/6    - =================Prophylaxis =============================  Lovenox for DVT proph  Protonix for  GI proph    ==================GOC==================================   FULL CODE    updated condition to family by resident  Spoke with patient/s wife Rosa and grand daughter, she wants her son to be the POC

## 2021-04-11 LAB
ALBUMIN SERPL ELPH-MCNC: 2.2 G/DL — LOW (ref 3.5–5)
ALP SERPL-CCNC: 85 U/L — SIGNIFICANT CHANGE UP (ref 40–120)
ALT FLD-CCNC: 61 U/L DA — HIGH (ref 10–60)
ANION GAP SERPL CALC-SCNC: 4 MMOL/L — LOW (ref 5–17)
ANION GAP SERPL CALC-SCNC: 6 MMOL/L — SIGNIFICANT CHANGE UP (ref 5–17)
AST SERPL-CCNC: 43 U/L — HIGH (ref 10–40)
BASE EXCESS BLDA CALC-SCNC: 14 MMOL/L — HIGH (ref -2–3)
BASOPHILS # BLD AUTO: 0.13 K/UL — SIGNIFICANT CHANGE UP (ref 0–0.2)
BASOPHILS NFR BLD AUTO: 0.5 % — SIGNIFICANT CHANGE UP (ref 0–2)
BILIRUB SERPL-MCNC: 1.4 MG/DL — HIGH (ref 0.2–1.2)
BLOOD GAS COMMENTS ARTERIAL: SIGNIFICANT CHANGE UP
BUN SERPL-MCNC: 19 MG/DL — HIGH (ref 7–18)
BUN SERPL-MCNC: 23 MG/DL — HIGH (ref 7–18)
CALCIUM SERPL-MCNC: 8.6 MG/DL — SIGNIFICANT CHANGE UP (ref 8.4–10.5)
CALCIUM SERPL-MCNC: 8.7 MG/DL — SIGNIFICANT CHANGE UP (ref 8.4–10.5)
CHLORIDE SERPL-SCNC: 102 MMOL/L — SIGNIFICANT CHANGE UP (ref 96–108)
CHLORIDE SERPL-SCNC: 95 MMOL/L — LOW (ref 96–108)
CO2 SERPL-SCNC: 33 MMOL/L — HIGH (ref 22–31)
CO2 SERPL-SCNC: 37 MMOL/L — HIGH (ref 22–31)
CREAT SERPL-MCNC: 0.39 MG/DL — LOW (ref 0.5–1.3)
CREAT SERPL-MCNC: 0.6 MG/DL — SIGNIFICANT CHANGE UP (ref 0.5–1.3)
CRP SERPL-MCNC: 197 MG/L — HIGH
EOSINOPHIL # BLD AUTO: 0.09 K/UL — SIGNIFICANT CHANGE UP (ref 0–0.5)
EOSINOPHIL NFR BLD AUTO: 0.4 % — SIGNIFICANT CHANGE UP (ref 0–6)
FERRITIN SERPL-MCNC: 1194 NG/ML — HIGH (ref 30–400)
FERRITIN SERPL-MCNC: 1236 NG/ML — HIGH (ref 30–400)
GLUCOSE BLDC GLUCOMTR-MCNC: 102 MG/DL — HIGH (ref 70–99)
GLUCOSE BLDC GLUCOMTR-MCNC: 132 MG/DL — HIGH (ref 70–99)
GLUCOSE BLDC GLUCOMTR-MCNC: 169 MG/DL — HIGH (ref 70–99)
GLUCOSE BLDC GLUCOMTR-MCNC: 209 MG/DL — HIGH (ref 70–99)
GLUCOSE SERPL-MCNC: 131 MG/DL — HIGH (ref 70–99)
GLUCOSE SERPL-MCNC: 93 MG/DL — SIGNIFICANT CHANGE UP (ref 70–99)
HCO3 BLDA-SCNC: 42 MMOL/L — HIGH (ref 21–28)
HCT VFR BLD CALC: 32.4 % — LOW (ref 39–50)
HGB BLD-MCNC: 10.6 G/DL — LOW (ref 13–17)
HOROWITZ INDEX BLDA+IHG-RTO: 80 — SIGNIFICANT CHANGE UP
IMM GRANULOCYTES NFR BLD AUTO: 1.3 % — SIGNIFICANT CHANGE UP (ref 0–1.5)
LYMPHOCYTES # BLD AUTO: 0.8 K/UL — LOW (ref 1–3.3)
LYMPHOCYTES # BLD AUTO: 3.3 % — LOW (ref 13–44)
MAGNESIUM SERPL-MCNC: 2 MG/DL — SIGNIFICANT CHANGE UP (ref 1.6–2.6)
MCHC RBC-ENTMCNC: 32.3 PG — SIGNIFICANT CHANGE UP (ref 27–34)
MCHC RBC-ENTMCNC: 32.7 GM/DL — SIGNIFICANT CHANGE UP (ref 32–36)
MCV RBC AUTO: 98.8 FL — SIGNIFICANT CHANGE UP (ref 80–100)
MONOCYTES # BLD AUTO: 0.33 K/UL — SIGNIFICANT CHANGE UP (ref 0–0.9)
MONOCYTES NFR BLD AUTO: 1.4 % — LOW (ref 2–14)
NEUTROPHILS # BLD AUTO: 22.37 K/UL — HIGH (ref 1.8–7.4)
NEUTROPHILS NFR BLD AUTO: 93.1 % — HIGH (ref 43–77)
NRBC # BLD: 0 /100 WBCS — SIGNIFICANT CHANGE UP (ref 0–0)
PCO2 BLDA: 64 MMHG — HIGH (ref 35–48)
PH BLDA: 7.42 — SIGNIFICANT CHANGE UP (ref 7.35–7.45)
PHOSPHATE SERPL-MCNC: 3.1 MG/DL — SIGNIFICANT CHANGE UP (ref 2.5–4.5)
PLATELET # BLD AUTO: 315 K/UL — SIGNIFICANT CHANGE UP (ref 150–400)
PO2 BLDA: 60 MMHG — LOW (ref 83–108)
POTASSIUM SERPL-MCNC: 3.1 MMOL/L — LOW (ref 3.5–5.3)
POTASSIUM SERPL-MCNC: 3.4 MMOL/L — LOW (ref 3.5–5.3)
POTASSIUM SERPL-SCNC: 3.1 MMOL/L — LOW (ref 3.5–5.3)
POTASSIUM SERPL-SCNC: 3.4 MMOL/L — LOW (ref 3.5–5.3)
PROCALCITONIN SERPL-MCNC: 2.32 NG/ML — HIGH (ref 0.02–0.1)
PROT SERPL-MCNC: 6.2 G/DL — SIGNIFICANT CHANGE UP (ref 6–8.3)
RBC # BLD: 3.28 M/UL — LOW (ref 4.2–5.8)
RBC # FLD: 13 % — SIGNIFICANT CHANGE UP (ref 10.3–14.5)
SAO2 % BLDA: 92 % — SIGNIFICANT CHANGE UP
SODIUM SERPL-SCNC: 138 MMOL/L — SIGNIFICANT CHANGE UP (ref 135–145)
SODIUM SERPL-SCNC: 139 MMOL/L — SIGNIFICANT CHANGE UP (ref 135–145)
TRIGL SERPL-MCNC: 269 MG/DL — HIGH
WBC # BLD: 24.03 K/UL — HIGH (ref 3.8–10.5)
WBC # FLD AUTO: 24.03 K/UL — HIGH (ref 3.8–10.5)

## 2021-04-11 PROCEDURE — 71045 X-RAY EXAM CHEST 1 VIEW: CPT | Mod: 26

## 2021-04-11 RX ORDER — PIPERACILLIN AND TAZOBACTAM 4; .5 G/20ML; G/20ML
3.38 INJECTION, POWDER, LYOPHILIZED, FOR SOLUTION INTRAVENOUS ONCE
Refills: 0 | Status: COMPLETED | OUTPATIENT
Start: 2021-04-11 | End: 2021-04-11

## 2021-04-11 RX ORDER — VANCOMYCIN HCL 1 G
1250 VIAL (EA) INTRAVENOUS ONCE
Refills: 0 | Status: COMPLETED | OUTPATIENT
Start: 2021-04-11 | End: 2021-04-11

## 2021-04-11 RX ORDER — PIPERACILLIN AND TAZOBACTAM 4; .5 G/20ML; G/20ML
3.38 INJECTION, POWDER, LYOPHILIZED, FOR SOLUTION INTRAVENOUS EVERY 8 HOURS
Refills: 0 | Status: DISCONTINUED | OUTPATIENT
Start: 2021-04-11 | End: 2021-04-13

## 2021-04-11 RX ORDER — POTASSIUM CHLORIDE 20 MEQ
40 PACKET (EA) ORAL ONCE
Refills: 0 | Status: COMPLETED | OUTPATIENT
Start: 2021-04-11 | End: 2021-04-11

## 2021-04-11 RX ORDER — PHENYLEPHRINE HYDROCHLORIDE 10 MG/ML
1 INJECTION INTRAVENOUS
Qty: 160 | Refills: 0 | Status: DISCONTINUED | OUTPATIENT
Start: 2021-04-11 | End: 2021-04-11

## 2021-04-11 RX ADMIN — CHLORHEXIDINE GLUCONATE 1 APPLICATION(S): 213 SOLUTION TOPICAL at 05:06

## 2021-04-11 RX ADMIN — Medication 1000 MILLIGRAM(S): at 11:17

## 2021-04-11 RX ADMIN — Medication 650 MILLIGRAM(S): at 05:00

## 2021-04-11 RX ADMIN — ALBUTEROL 2 PUFF(S): 90 AEROSOL, METERED ORAL at 03:30

## 2021-04-11 RX ADMIN — ACETAZOLAMIDE 105 MILLIGRAM(S): 250 TABLET ORAL at 05:06

## 2021-04-11 RX ADMIN — Medication 2: at 12:08

## 2021-04-11 RX ADMIN — ALBUTEROL 2 PUFF(S): 90 AEROSOL, METERED ORAL at 15:59

## 2021-04-11 RX ADMIN — FENTANYL CITRATE 33.5 MICROGRAM(S)/KG/HR: 50 INJECTION INTRAVENOUS at 04:43

## 2021-04-11 RX ADMIN — Medication 50 MILLILITER(S): at 05:06

## 2021-04-11 RX ADMIN — FENTANYL CITRATE 33.5 MICROGRAM(S)/KG/HR: 50 INJECTION INTRAVENOUS at 20:32

## 2021-04-11 RX ADMIN — Medication 50 MILLIGRAM(S): at 05:09

## 2021-04-11 RX ADMIN — PIPERACILLIN AND TAZOBACTAM 25 GRAM(S): 4; .5 INJECTION, POWDER, LYOPHILIZED, FOR SOLUTION INTRAVENOUS at 13:07

## 2021-04-11 RX ADMIN — Medication 1: at 17:06

## 2021-04-11 RX ADMIN — Medication 650 MILLIGRAM(S): at 06:53

## 2021-04-11 RX ADMIN — Medication 30 MILLIGRAM(S): at 05:09

## 2021-04-11 RX ADMIN — DEXMEDETOMIDINE HYDROCHLORIDE IN 0.9% SODIUM CHLORIDE 21 MICROGRAM(S)/KG/HR: 4 INJECTION INTRAVENOUS at 20:32

## 2021-04-11 RX ADMIN — FENTANYL CITRATE 33.5 MICROGRAM(S)/KG/HR: 50 INJECTION INTRAVENOUS at 12:27

## 2021-04-11 RX ADMIN — Medication 2 MILLIGRAM(S): at 05:09

## 2021-04-11 RX ADMIN — Medication 81 MILLIGRAM(S): at 11:17

## 2021-04-11 RX ADMIN — PANTOPRAZOLE SODIUM 40 MILLIGRAM(S): 20 TABLET, DELAYED RELEASE ORAL at 11:17

## 2021-04-11 RX ADMIN — ALBUTEROL 2 PUFF(S): 90 AEROSOL, METERED ORAL at 20:56

## 2021-04-11 RX ADMIN — SENNA PLUS 2 TABLET(S): 8.6 TABLET ORAL at 22:17

## 2021-04-11 RX ADMIN — ALBUTEROL 2 PUFF(S): 90 AEROSOL, METERED ORAL at 09:01

## 2021-04-11 RX ADMIN — DEXMEDETOMIDINE HYDROCHLORIDE IN 0.9% SODIUM CHLORIDE 21 MICROGRAM(S)/KG/HR: 4 INJECTION INTRAVENOUS at 15:11

## 2021-04-11 RX ADMIN — Medication 40 MILLIEQUIVALENT(S): at 09:44

## 2021-04-11 RX ADMIN — PIPERACILLIN AND TAZOBACTAM 25 GRAM(S): 4; .5 INJECTION, POWDER, LYOPHILIZED, FOR SOLUTION INTRAVENOUS at 22:13

## 2021-04-11 RX ADMIN — PIPERACILLIN AND TAZOBACTAM 200 GRAM(S): 4; .5 INJECTION, POWDER, LYOPHILIZED, FOR SOLUTION INTRAVENOUS at 07:06

## 2021-04-11 RX ADMIN — DEXMEDETOMIDINE HYDROCHLORIDE IN 0.9% SODIUM CHLORIDE 21 MICROGRAM(S)/KG/HR: 4 INJECTION INTRAVENOUS at 09:44

## 2021-04-11 RX ADMIN — ACETAZOLAMIDE 105 MILLIGRAM(S): 250 TABLET ORAL at 17:22

## 2021-04-11 RX ADMIN — PROPOFOL 10.1 MICROGRAM(S)/KG/MIN: 10 INJECTION, EMULSION INTRAVENOUS at 00:41

## 2021-04-11 RX ADMIN — DEXMEDETOMIDINE HYDROCHLORIDE IN 0.9% SODIUM CHLORIDE 21 MICROGRAM(S)/KG/HR: 4 INJECTION INTRAVENOUS at 00:40

## 2021-04-11 RX ADMIN — Medication 40 MILLIEQUIVALENT(S): at 06:55

## 2021-04-11 RX ADMIN — Medication 40 MILLIGRAM(S): at 05:09

## 2021-04-11 RX ADMIN — Medication 166.67 MILLIGRAM(S): at 09:44

## 2021-04-11 RX ADMIN — PHENYLEPHRINE HYDROCHLORIDE 15.7 MICROGRAM(S)/KG/MIN: 10 INJECTION INTRAVENOUS at 01:40

## 2021-04-11 RX ADMIN — Medication 2 MILLIGRAM(S): at 17:22

## 2021-04-11 NOTE — PROGRESS NOTE ADULT - SUBJECTIVE AND OBJECTIVE BOX
Patient is a 55y old  Male who presents with a chief complaint of SOB (11 Apr 2021 01:31)      pt seen in icu [ x ], reg med floor [   ], bed [ x ], chair at bedside [   ], a+o x3 [  ], sedated [x  ],  nad [x  ]    andrea [ x ], ngt feed [x  ], et tube [ x ], cent line [x  ],          Allergies    No Known Allergies        Vitals    T(F): 100.4 (04-11-21 @ 04:00), Max: 102.5 (04-10-21 @ 20:00)  HR: 76 (04-11-21 @ 06:30) (66 - 135)  BP: 102/54 (04-10-21 @ 08:00) (102/54 - 102/54)  RR: 24 (04-11-21 @ 06:30) (20 - 33)  SpO2: 94% (04-11-21 @ 06:30) (85% - 97%)  Wt(kg): --  CAPILLARY BLOOD GLUCOSE      POCT Blood Glucose.: 102 mg/dL (11 Apr 2021 04:15)      Labs                          10.6   24.03 )-----------( 315      ( 11 Apr 2021 04:31 )             32.4       04-11    138  |  95<L>  |  23<H>  ----------------------------<  93  3.1<L>   |  37<H>  |  0.60    Ca    8.6      11 Apr 2021 04:31  Phos  3.1     04-11  Mg     2.0     04-11    TPro  6.2  /  Alb  2.2<L>  /  TBili  1.4<H>  /  DBili  x   /  AST  43<H>  /  ALT  61<H>  /  AlkPhos  85  04-11            .Urine Clean Catch (Midstream)  03-15 @ 00:52   No growth  --  --          Radiology Results      Meds    MEDICATIONS  (STANDING):  acetaZOLAMIDE  IVPB 250 milliGRAM(s) IV Intermittent every 12 hours  ALBUTerol    90 MICROgram(s) HFA Inhaler 2 Puff(s) Inhalation every 6 hours  ascorbic acid 1000 milliGRAM(s) Oral daily  aspirin  chewable 81 milliGRAM(s) Oral daily  chlorhexidine 2% Cloths 1 Application(s) Topical <User Schedule>  dexMEDEtomidine Infusion 1 MICROgram(s)/kG/Hr (21 mL/Hr) IV Continuous <Continuous>  fentaNYL   Infusion 4 MICROgram(s)/kG/Hr (33.5 mL/Hr) IV Continuous <Continuous>  furosemide   Injectable 40 milliGRAM(s) IV Push two times a day  hydrochlorothiazide 50 milliGRAM(s) Oral daily  insulin lispro (ADMELOG) corrective regimen sliding scale   SubCutaneous every 6 hours  LORazepam   Injectable 2 milliGRAM(s) IV Push every 12 hours  pantoprazole   Suspension 40 milliGRAM(s) Oral daily  phenylephrine    Infusion 1 MICROgram(s)/kG/Min (15.7 mL/Hr) IV Continuous <Continuous>  polyethylene glycol 3350 17 Gram(s) Oral daily  predniSONE   Tablet 30 milliGRAM(s) Oral daily  propofol Infusion 20 MICROgram(s)/kG/Min (10.1 mL/Hr) IV Continuous <Continuous>  senna 2 Tablet(s) Oral at bedtime      MEDICATIONS  (PRN):  acetaminophen    Suspension .. 650 milliGRAM(s) Oral every 6 hours PRN Temp greater or equal to 38C (100.4F)  sodium chloride 0.9% lock flush 10 milliLiter(s) IV Push every 1 hour PRN Pre/post blood products, medications, blood draw, and to maintain line patency      Physical Exam    Neuro :  no focal deficits  Respiratory: CTA B/L  CV: RRR, S1S2, no murmurs,   Abdominal: Soft, NT, ND +BS,  Extremities: No edema, + peripheral pulses      ASSESSMENT    Hypoxemia 2nd to covid pna   transaminitis  prediabetes  h/o appendectomy  cholecystectomy        PLAN    contact and airborne isolation  d/c remdesevir given covid ab positive noted   completed dexamethasone   started pulse steroids for 3 days - 250mg solumedrol bid now tapered to 30mg daily  cont asa, vit c,    cont albuterol inhaler   pulm f/u  procalcitonin, D-dimer, crp, ldh, ferritin, lactate noted ,    tmx 100.4  cont tylenol prn,   cont robitussin prn   pt on fentanyl, phenylephrine, midazolam and precedex drip  s/p intubation 3/29/21  O2 sat (85% - 100%) mech vent  O2 via mech vent and taper fio2 as tolerated   serial abg's   vent mgmt as per icu  xray 3/19/21 with pneumomediastinum  rept cxr with New trace right apical pneumothorax. New mild left apical pneumothorax. Grossly stable small pneumomediastinum.  Soft tissue emphysema at the neck bases bilaterally. Grossly stable bilateral pulmonary infiltrates noted.   cxr 2/24 with No evidence of pneumothorax can be appreciated on the available image. This may be related to patient positioning. Evidence of pneumomediastinum and subcutaneous emphysema in the lower neck is again noted. There are patchy bibasilar infiltrates and elevated right hemidiaphragm noted.   cxr 3/29/21 with No significant change bilateral infiltrates. There is a small simple left apical pneumothorax. No significant pleural effusion. Bilateral subcutaneous emphysema similar to prior.   pt intubated 6AM 3/29/21,   XRs on 7AM and 5PM with no obvious increase of ptx  cxr 4/4/21 with Improving bilateral airspace disease noted    cxr 4/8/21 with Small left pneumothorax noted above.  thoracic surg f/u   s/p L chest tube placement  F/u CXR post placement, monitor site of placement   Daily CXR  Monitor O2 status   lispro ss   prognosis poor  cont current meds         Patient is a 55y old  Male who presents with a chief complaint of SOB (11 Apr 2021 01:31)      pt seen in icu [ x ], reg med floor [   ], bed [ x ], chair at bedside [   ], a+o x3 [  ], sedated [x  ],  nad [x  ]    andrea [ x ], ngt feed [x  ], et tube [ x ], cent line [x  ],          Allergies    No Known Allergies        Vitals    T(F): 100.4 (04-11-21 @ 04:00), Max: 102.5 (04-10-21 @ 20:00)  HR: 76 (04-11-21 @ 06:30) (66 - 135)  BP: 102/54 (04-10-21 @ 08:00) (102/54 - 102/54)  RR: 24 (04-11-21 @ 06:30) (20 - 33)  SpO2: 94% (04-11-21 @ 06:30) (85% - 97%)  Wt(kg): --  CAPILLARY BLOOD GLUCOSE      POCT Blood Glucose.: 102 mg/dL (11 Apr 2021 04:15)      Labs                          10.6   24.03 )-----------( 315      ( 11 Apr 2021 04:31 )             32.4       04-11    138  |  95<L>  |  23<H>  ----------------------------<  93  3.1<L>   |  37<H>  |  0.60    Ca    8.6      11 Apr 2021 04:31  Phos  3.1     04-11  Mg     2.0     04-11    TPro  6.2  /  Alb  2.2<L>  /  TBili  1.4<H>  /  DBili  x   /  AST  43<H>  /  ALT  61<H>  /  AlkPhos  85  04-11            .Urine Clean Catch (Midstream)  03-15 @ 00:52   No growth  --  --          Radiology Results      Meds    MEDICATIONS  (STANDING):  acetaZOLAMIDE  IVPB 250 milliGRAM(s) IV Intermittent every 12 hours  ALBUTerol    90 MICROgram(s) HFA Inhaler 2 Puff(s) Inhalation every 6 hours  ascorbic acid 1000 milliGRAM(s) Oral daily  aspirin  chewable 81 milliGRAM(s) Oral daily  chlorhexidine 2% Cloths 1 Application(s) Topical <User Schedule>  dexMEDEtomidine Infusion 1 MICROgram(s)/kG/Hr (21 mL/Hr) IV Continuous <Continuous>  fentaNYL   Infusion 4 MICROgram(s)/kG/Hr (33.5 mL/Hr) IV Continuous <Continuous>  furosemide   Injectable 40 milliGRAM(s) IV Push two times a day  hydrochlorothiazide 50 milliGRAM(s) Oral daily  insulin lispro (ADMELOG) corrective regimen sliding scale   SubCutaneous every 6 hours  LORazepam   Injectable 2 milliGRAM(s) IV Push every 12 hours  pantoprazole   Suspension 40 milliGRAM(s) Oral daily  phenylephrine    Infusion 1 MICROgram(s)/kG/Min (15.7 mL/Hr) IV Continuous <Continuous>  polyethylene glycol 3350 17 Gram(s) Oral daily  predniSONE   Tablet 30 milliGRAM(s) Oral daily  propofol Infusion 20 MICROgram(s)/kG/Min (10.1 mL/Hr) IV Continuous <Continuous>  senna 2 Tablet(s) Oral at bedtime      MEDICATIONS  (PRN):  acetaminophen    Suspension .. 650 milliGRAM(s) Oral every 6 hours PRN Temp greater or equal to 38C (100.4F)  sodium chloride 0.9% lock flush 10 milliLiter(s) IV Push every 1 hour PRN Pre/post blood products, medications, blood draw, and to maintain line patency      Physical Exam    Neuro :  no focal deficits  Respiratory: CTA B/L  CV: RRR, S1S2, no murmurs,   Abdominal: Soft, NT, ND +BS,  Extremities: No edema, + peripheral pulses      ASSESSMENT    Hypoxemia 2nd to covid pna   transaminitis  prediabetes  h/o appendectomy  cholecystectomy        PLAN    contact and airborne isolation  d/c remdesevir given covid ab positive noted   completed dexamethasone   started pulse steroids for 3 days - 250mg solumedrol bid now tapered to 30mg daily  cont asa, vit c,    cont albuterol inhaler   pulm f/u  procalcitonin, D-dimer, crp, ldh, ferritin, lactate noted ,    tmx 102.5  cont tylenol prn,   cont robitussin prn   pt on fentanyl, phenylephrine, midazolam and precedex drip  s/p intubation 3/29/21  O2 sat (85% - 97%) mech vent  O2 via mech vent and taper fio2 as tolerated   serial abg's   vent mgmt as per icu  xray 3/19/21 with pneumomediastinum  rept cxr with New trace right apical pneumothorax. New mild left apical pneumothorax. Grossly stable small pneumomediastinum.  Soft tissue emphysema at the neck bases bilaterally. Grossly stable bilateral pulmonary infiltrates noted.   cxr 2/24 with No evidence of pneumothorax can be appreciated on the available image. This may be related to patient positioning. Evidence of pneumomediastinum and subcutaneous emphysema in the lower neck is again noted. There are patchy bibasilar infiltrates and elevated right hemidiaphragm noted.   cxr 3/29/21 with No significant change bilateral infiltrates. There is a small simple left apical pneumothorax. No significant pleural effusion. Bilateral subcutaneous emphysema similar to prior.   pt intubated 6AM 3/29/21,   XRs on 7AM and 5PM with no obvious increase of ptx  cxr 4/4/21 with Improving bilateral airspace disease noted    cxr 4/8/21 with Small left pneumothorax noted above.  thoracic surg f/u   s/p L chest tube placement  F/u CXR post placement, monitor site of placement   Daily CXR  Monitor O2 status   lispro ss   prognosis poor  cont current meds

## 2021-04-11 NOTE — PROGRESS NOTE ADULT - ATTENDING COMMENTS
55 yr old  man , non smoker with  moody 1990s presented 3/14 with x9 days worsening cough, subjective fevers, and SOB, with x2-3 days dysuria and central, non-radiating, constant CP. Admitted to medicine unit  for acute hypoxic respiratory failure secondary to pna from covid-19 infection .     Assessment:  1. Acute hypoxic respiratory failure  2 Covid-19 infection   3. Transaminitis  4. Prediabetes  5. Bilateral pneumothorax    Plan   -pat intubated 3/29   -Mechanical ventilation with lung protective strategy, titrate down fio2 as tolerated  -adjust vent as per ABG  -Increased leukocytosis and fevers, concern for new bacterial pneumonia  -Empiric antibiotics   -Hold lasix for now  -PTX  improved post chest tube placement  -Cont. precedex and fentanyl  -Ativan for benzo dependence  -isolation : contact and air borne   -monitor biomarkers daily, trending down   -Tube feedings, bowel regimen  -dvt/gi prophy  -hemodynamic monitoring   - May need trach/peg if unable to extubate by early next week

## 2021-04-11 NOTE — PROGRESS NOTE ADULT - SUBJECTIVE AND OBJECTIVE BOX
Patient is a 55y old  Male who presents with a chief complaint of SOB (11 Apr 2021 06:37)  Patient is sedated, intubated, laying in bed in NAD. Back on pressors meds. Still with left sided chest tube. FIO2 80% sat 91%.    INTERVAL HPI/OVERNIGHT EVENTS:      VITAL SIGNS:  T(F): 100.4 (04-11-21 @ 04:00)  HR: 77 (04-11-21 @ 10:30)  BP: --  RR: 27 (04-11-21 @ 10:30)  SpO2: 96% (04-11-21 @ 10:30)  Wt(kg): --  I&O's Detail    10 Apr 2021 07:01  -  11 Apr 2021 07:00  --------------------------------------------------------  IN:    Albumin 25%  -  50 mL: 200 mL    Dexmedetomidine: 279 mL    Dexmedetomidine: 315 mL    Enteral Tube Flush: 350 mL    FentaNYL: 810 mL    Glucerna 1.5: 425 mL    IV PiggyBack: 100 mL    IV PiggyBack: 100 mL    Phenylephrine: 113.4 mL    Propofol: 248.7 mL  Total IN: 2941.1 mL    OUT:    Chest Tube (mL): 20 mL    Indwelling Catheter - Urethral (mL): 3950 mL  Total OUT: 3970 mL    Total NET: -1028.9 mL      11 Apr 2021 07:01  -  11 Apr 2021 10:52  --------------------------------------------------------  IN:    Dexmedetomidine: 84 mL    FentaNYL: 133.2 mL    Glucerna 1.5: 100 mL    Phenylephrine: 75.6 mL    Propofol: 80.4 mL  Total IN: 473.2 mL    OUT:    Indwelling Catheter - Urethral (mL): 700 mL  Total OUT: 700 mL    Total NET: -226.8 mL        Mode: AC/ CMV (Assist Control/ Continuous Mandatory Ventilation)  RR (machine): 30  FiO2: 80  PEEP: 8  ITime: 0.8  MAP: 16  PC: 20  PIP: 29        REVIEW OF SYSTEMS:    CONSTITUTIONAL:  No fevers, chills, sweats    HEENT:  Eyes:  No diplopia or blurred vision. ENT:  No earache, sore throat or runny nose.    CARDIOVASCULAR:  No pressure, squeezing, tightness, or heaviness about the chest; no palpitations.    RESPIRATORY:  Per HPI    GASTROINTESTINAL:  No abdominal pain, nausea, vomiting or diarrhea.    GENITOURINARY:  No dysuria, frequency or urgency.    NEUROLOGIC:  No paresthesias, fasciculations, seizures or weakness.    PSYCHIATRIC:  No disorder of thought or mood.      PHYSICAL EXAM:    Constitutional: Well developed and nourished  Eyes:Perrla  ENMT: normal  Neck:supple  Respiratory: good air entry  Cardiovascular: S1 S2 regular  Gastrointestinal: Soft, Non tender  Extremities: No edema  Vascular:normal  Neurological:Sedated  Musculoskeletal:Normal      MEDICATIONS  (STANDING):  acetaZOLAMIDE  IVPB 250 milliGRAM(s) IV Intermittent every 12 hours  ALBUTerol    90 MICROgram(s) HFA Inhaler 2 Puff(s) Inhalation every 6 hours  ascorbic acid 1000 milliGRAM(s) Oral daily  aspirin  chewable 81 milliGRAM(s) Oral daily  chlorhexidine 2% Cloths 1 Application(s) Topical <User Schedule>  dexMEDEtomidine Infusion 1 MICROgram(s)/kG/Hr (21 mL/Hr) IV Continuous <Continuous>  fentaNYL   Infusion 4 MICROgram(s)/kG/Hr (33.5 mL/Hr) IV Continuous <Continuous>  hydrochlorothiazide 50 milliGRAM(s) Oral daily  insulin lispro (ADMELOG) corrective regimen sliding scale   SubCutaneous every 6 hours  LORazepam   Injectable 2 milliGRAM(s) IV Push every 12 hours  pantoprazole   Suspension 40 milliGRAM(s) Oral daily  phenylephrine    Infusion 1 MICROgram(s)/kG/Min (15.7 mL/Hr) IV Continuous <Continuous>  piperacillin/tazobactam IVPB.. 3.375 Gram(s) IV Intermittent every 8 hours  polyethylene glycol 3350 17 Gram(s) Oral daily  predniSONE   Tablet 30 milliGRAM(s) Oral daily  propofol Infusion 20 MICROgram(s)/kG/Min (10.1 mL/Hr) IV Continuous <Continuous>  senna 2 Tablet(s) Oral at bedtime    MEDICATIONS  (PRN):  acetaminophen    Suspension .. 650 milliGRAM(s) Oral every 6 hours PRN Temp greater or equal to 38C (100.4F)  sodium chloride 0.9% lock flush 10 milliLiter(s) IV Push every 1 hour PRN Pre/post blood products, medications, blood draw, and to maintain line patency      Allergies    No Known Allergies    Intolerances        LABS:                        10.6   24.03 )-----------( 315      ( 11 Apr 2021 04:31 )             32.4     04-11    138  |  95<L>  |  23<H>  ----------------------------<  93  3.1<L>   |  37<H>  |  0.60    Ca    8.6      11 Apr 2021 04:31  Phos  3.1     04-11  Mg     2.0     04-11    TPro  6.2  /  Alb  2.2<L>  /  TBili  1.4<H>  /  DBili  x   /  AST  43<H>  /  ALT  61<H>  /  AlkPhos  85  04-11        ABG - ( 11 Apr 2021 04:01 )  pH, Arterial: 7.42  pH, Blood: x     /  pCO2: 64    /  pO2: 60    / HCO3: 42    / Base Excess: 14.0  /  SaO2: 92                    CAPILLARY BLOOD GLUCOSE      POCT Blood Glucose.: 102 mg/dL (11 Apr 2021 04:15)  POCT Blood Glucose.: 119 mg/dL (10 Apr 2021 22:04)  POCT Blood Glucose.: 124 mg/dL (10 Apr 2021 16:21)  POCT Blood Glucose.: 156 mg/dL (10 Apr 2021 11:14)    pro-bnp -- 04-09 @ 04:38     d-dimer 507  04-09 @ 04:38  pro-bnp -- 04-06 @ 05:03     d-dimer 363  04-06 @ 05:03      RADIOLOGY & ADDITIONAL TESTS:    CXR:  < from: Xray Chest 1 View- PORTABLE-Urgent (Xray Chest 1 View- PORTABLE-Urgent .) (04.09.21 @ 10:53) >    IMPRESSION:   Increasing of LEFT lung pneumothorax/30% without midline shift. Otherwise no change..    A FOLLOW-UP 4/8/2021 6:24 PM CHEST RADIOGRAPH shows insertion of LEFT chest tube with reexpansion of LEFT lung. Otherwise no change.    FOLLOW-UP AP PORTABLE CHEST RADIOGRAPH 4/9/2021 9:17 AM:  No interval change. LEFT chest tube in place. No pneumothorax.  Diffuse airspace disease. Catheters and tubes in place.      < end of copied text >    Ct scan chest:    ekg;    echo:

## 2021-04-11 NOTE — PROGRESS NOTE ADULT - SUBJECTIVE AND OBJECTIVE BOX
INTERVAL HPI/OVERNIGHT EVENTS: no overnight events     PRESSORS: [ ] YES [x ] NO    Antimicrobial:    Cardiovascular:  acetaZOLAMIDE  IVPB 250 milliGRAM(s) IV Intermittent every 12 hours  furosemide   Injectable 40 milliGRAM(s) IV Push two times a day  hydrochlorothiazide 50 milliGRAM(s) Oral daily  phenylephrine    Infusion 1 MICROgram(s)/kG/Min IV Continuous <Continuous>    Pulmonary:  ALBUTerol    90 MICROgram(s) HFA Inhaler 2 Puff(s) Inhalation every 6 hours    Hematalogic:  aspirin  chewable 81 milliGRAM(s) Oral daily    Other:  acetaminophen    Suspension .. 650 milliGRAM(s) Oral every 6 hours PRN  albumin human 25% IVPB 50 milliLiter(s) IV Intermittent every 6 hours  ascorbic acid 1000 milliGRAM(s) Oral daily  chlorhexidine 2% Cloths 1 Application(s) Topical <User Schedule>  dexMEDEtomidine Infusion 1 MICROgram(s)/kG/Hr IV Continuous <Continuous>  fentaNYL   Infusion 4 MICROgram(s)/kG/Hr IV Continuous <Continuous>  insulin lispro (ADMELOG) corrective regimen sliding scale   SubCutaneous every 6 hours  LORazepam   Injectable 2 milliGRAM(s) IV Push every 12 hours  pantoprazole   Suspension 40 milliGRAM(s) Oral daily  polyethylene glycol 3350 17 Gram(s) Oral daily  predniSONE   Tablet 30 milliGRAM(s) Oral daily  propofol Infusion 20 MICROgram(s)/kG/Min IV Continuous <Continuous>  senna 2 Tablet(s) Oral at bedtime  sodium chloride 0.9% lock flush 10 milliLiter(s) IV Push every 1 hour PRN    acetaminophen    Suspension .. 650 milliGRAM(s) Oral every 6 hours PRN  acetaZOLAMIDE  IVPB 250 milliGRAM(s) IV Intermittent every 12 hours  albumin human 25% IVPB 50 milliLiter(s) IV Intermittent every 6 hours  ALBUTerol    90 MICROgram(s) HFA Inhaler 2 Puff(s) Inhalation every 6 hours  ascorbic acid 1000 milliGRAM(s) Oral daily  aspirin  chewable 81 milliGRAM(s) Oral daily  chlorhexidine 2% Cloths 1 Application(s) Topical <User Schedule>  dexMEDEtomidine Infusion 1 MICROgram(s)/kG/Hr IV Continuous <Continuous>  fentaNYL   Infusion 4 MICROgram(s)/kG/Hr IV Continuous <Continuous>  furosemide   Injectable 40 milliGRAM(s) IV Push two times a day  hydrochlorothiazide 50 milliGRAM(s) Oral daily  insulin lispro (ADMELOG) corrective regimen sliding scale   SubCutaneous every 6 hours  LORazepam   Injectable 2 milliGRAM(s) IV Push every 12 hours  pantoprazole   Suspension 40 milliGRAM(s) Oral daily  phenylephrine    Infusion 1 MICROgram(s)/kG/Min IV Continuous <Continuous>  polyethylene glycol 3350 17 Gram(s) Oral daily  predniSONE   Tablet 30 milliGRAM(s) Oral daily  propofol Infusion 20 MICROgram(s)/kG/Min IV Continuous <Continuous>  senna 2 Tablet(s) Oral at bedtime  sodium chloride 0.9% lock flush 10 milliLiter(s) IV Push every 1 hour PRN    Drug Dosing Weight  Height (cm): 167.6 (14 Mar 2021 12:03)  Weight (kg): 83.869 (14 Mar 2021 12:03)  BMI (kg/m2): 29.9 (14 Mar 2021 12:03)  BSA (m2): 1.93 (14 Mar 2021 12:03)    CENTRAL LINE: [ ] YES [ ] NO  LOCATION:         RIVERA: [ ] YES [ ] NO          A-LINE:  [ ] YES [ ] NO  LOCATION:             ICU Vital Signs Last 24 Hrs  T(C): 38.6 (11 Apr 2021 00:00), Max: 39.2 (10 Apr 2021 20:00)  T(F): 101.5 (11 Apr 2021 00:00), Max: 102.5 (10 Apr 2021 20:00)  HR: 92 (11 Apr 2021 01:00) (66 - 135)  BP: 102/54 (10 Apr 2021 08:00) (102/54 - 130/97)  BP(mean): 66 (10 Apr 2021 08:00) (66 - 105)  ABP: 97/50 (11 Apr 2021 01:00) (86/48 - 172/77)  ABP(mean): 64 (11 Apr 2021 01:00) (59 - 109)  RR: 30 (11 Apr 2021 01:00) (20 - 33)  SpO2: 88% (11 Apr 2021 01:00) (85% - 97%)      ABG - ( 10 Apr 2021 03:55 )  pH, Arterial: 7.48  pH, Blood: x     /  pCO2: 69    /  pO2: 62    / HCO3: 51    / Base Excess: 23.3  /  SaO2: 94                    04-09 @ 07:01  -  04-10 @ 07:00  --------------------------------------------------------  IN: 3402.7 mL / OUT: 3648 mL / NET: -245.3 mL        Mode: AC/ CMV (Assist Control/ Continuous Mandatory Ventilation)  RR (machine): 30  FiO2: 80  PEEP: 8  ITime: 1  MAP: 16  PC: 20  PIP: 30        PHYSICAL EXAM:    GENERAL: NAD  EYES: EOMI, PERRLA  NECK: Supple, No JVD; Normal thyroid; Trachea midline: No LAD   NERVOUS SYSTEM:  Alert & Oriented X3,  Motor Strength 5/5 B/L upper and lower extremities; DTRs 2+ intact and symmetric  CHEST/LUNG: No rales, rhonchi, wheezing, breath sounds present bilaterally  HEART: Regular rate and rhythm; No murmurs, no gallops  ABDOMEN: Soft, Nontender, Nondistended; Bowel sounds present, no pain or masses on palpation  : voiding well, Rivera in place  EXTREMITIES:  2+ Peripheral Pulses, No clubbing, cyanosis, or edema  SKIN: warm, intact, no lesions         LABS:  CBC Full  -  ( 10 Apr 2021 03:55 )  WBC Count : 10.18 K/uL  RBC Count : 2.90 M/uL  Hemoglobin : 9.1 g/dL  Hematocrit : 28.8 %  Platelet Count - Automated : 184 K/uL  Mean Cell Volume : 99.3 fl  Mean Cell Hemoglobin : 31.4 pg  Mean Cell Hemoglobin Concentration : 31.6 gm/dL  Auto Neutrophil # : 8.45 K/uL  Auto Lymphocyte # : 1.22 K/uL  Auto Monocyte # : 0.20 K/uL  Auto Eosinophil # : 0.20 K/uL  Auto Basophil # : 0.00 K/uL  Auto Neutrophil % : 74.0 %  Auto Lymphocyte % : 12.0 %  Auto Monocyte % : 2.0 %  Auto Eosinophil % : 2.0 %  Auto Basophil % : 0.0 %    04-10    137  |  94<L>  |  20<H>  ----------------------------<  141<H>  3.8   |  39<H>  |  0.47<L>    Ca    8.3<L>      10 Apr 2021 12:50  Phos  4.7     04-10  Mg     2.1     04-10    TPro  5.4<L>  /  Alb  2.4<L>  /  TBili  0.9  /  DBili  x   /  AST  42<H>  /  ALT  53  /  AlkPhos  70  04-10            RADIOLOGY & ADDITIONAL STUDIES REVIEWED:  ***    [ ]GOALS OF CARE DISCUSSION WITH PATIENT/FAMILY/PROXY:    CRITICAL CARE TIME SPENT: 35 minutes   INTERVAL HPI/OVERNIGHT EVENTS: patient was having fever 100.8 rectal, s/p Tylenol     PRESSORS: [ ] YES [x ] NO    Antimicrobial:    Cardiovascular:  acetaZOLAMIDE  IVPB 250 milliGRAM(s) IV Intermittent every 12 hours  furosemide   Injectable 40 milliGRAM(s) IV Push two times a day  hydrochlorothiazide 50 milliGRAM(s) Oral daily  phenylephrine    Infusion 1 MICROgram(s)/kG/Min IV Continuous <Continuous>    Pulmonary:  ALBUTerol    90 MICROgram(s) HFA Inhaler 2 Puff(s) Inhalation every 6 hours    Hematalogic:  aspirin  chewable 81 milliGRAM(s) Oral daily    Other:  acetaminophen    Suspension .. 650 milliGRAM(s) Oral every 6 hours PRN  albumin human 25% IVPB 50 milliLiter(s) IV Intermittent every 6 hours  ascorbic acid 1000 milliGRAM(s) Oral daily  chlorhexidine 2% Cloths 1 Application(s) Topical <User Schedule>  dexMEDEtomidine Infusion 1 MICROgram(s)/kG/Hr IV Continuous <Continuous>  fentaNYL   Infusion 4 MICROgram(s)/kG/Hr IV Continuous <Continuous>  insulin lispro (ADMELOG) corrective regimen sliding scale   SubCutaneous every 6 hours  LORazepam   Injectable 2 milliGRAM(s) IV Push every 12 hours  pantoprazole   Suspension 40 milliGRAM(s) Oral daily  polyethylene glycol 3350 17 Gram(s) Oral daily  predniSONE   Tablet 30 milliGRAM(s) Oral daily  propofol Infusion 20 MICROgram(s)/kG/Min IV Continuous <Continuous>  senna 2 Tablet(s) Oral at bedtime  sodium chloride 0.9% lock flush 10 milliLiter(s) IV Push every 1 hour PRN    acetaminophen    Suspension .. 650 milliGRAM(s) Oral every 6 hours PRN  acetaZOLAMIDE  IVPB 250 milliGRAM(s) IV Intermittent every 12 hours  albumin human 25% IVPB 50 milliLiter(s) IV Intermittent every 6 hours  ALBUTerol    90 MICROgram(s) HFA Inhaler 2 Puff(s) Inhalation every 6 hours  ascorbic acid 1000 milliGRAM(s) Oral daily  aspirin  chewable 81 milliGRAM(s) Oral daily  chlorhexidine 2% Cloths 1 Application(s) Topical <User Schedule>  dexMEDEtomidine Infusion 1 MICROgram(s)/kG/Hr IV Continuous <Continuous>  fentaNYL   Infusion 4 MICROgram(s)/kG/Hr IV Continuous <Continuous>  furosemide   Injectable 40 milliGRAM(s) IV Push two times a day  hydrochlorothiazide 50 milliGRAM(s) Oral daily  insulin lispro (ADMELOG) corrective regimen sliding scale   SubCutaneous every 6 hours  LORazepam   Injectable 2 milliGRAM(s) IV Push every 12 hours  pantoprazole   Suspension 40 milliGRAM(s) Oral daily  phenylephrine    Infusion 1 MICROgram(s)/kG/Min IV Continuous <Continuous>  polyethylene glycol 3350 17 Gram(s) Oral daily  predniSONE   Tablet 30 milliGRAM(s) Oral daily  propofol Infusion 20 MICROgram(s)/kG/Min IV Continuous <Continuous>  senna 2 Tablet(s) Oral at bedtime  sodium chloride 0.9% lock flush 10 milliLiter(s) IV Push every 1 hour PRN    Drug Dosing Weight  Height (cm): 167.6 (14 Mar 2021 12:03)  Weight (kg): 83.869 (14 Mar 2021 12:03)  BMI (kg/m2): 29.9 (14 Mar 2021 12:03)  BSA (m2): 1.93 (14 Mar 2021 12:03)    CENTRAL LINE: [ ] YES [ ] NO  LOCATION:         RIVERA: [ ] YES [ ] NO          A-LINE:  [ ] YES [ ] NO  LOCATION:             ICU Vital Signs Last 24 Hrs  T(C): 38.6 (11 Apr 2021 00:00), Max: 39.2 (10 Apr 2021 20:00)  T(F): 101.5 (11 Apr 2021 00:00), Max: 102.5 (10 Apr 2021 20:00)  HR: 92 (11 Apr 2021 01:00) (66 - 135)  BP: 102/54 (10 Apr 2021 08:00) (102/54 - 130/97)  BP(mean): 66 (10 Apr 2021 08:00) (66 - 105)  ABP: 97/50 (11 Apr 2021 01:00) (86/48 - 172/77)  ABP(mean): 64 (11 Apr 2021 01:00) (59 - 109)  RR: 30 (11 Apr 2021 01:00) (20 - 33)  SpO2: 88% (11 Apr 2021 01:00) (85% - 97%)      ABG - ( 10 Apr 2021 03:55 )  pH, Arterial: 7.48  pH, Blood: x     /  pCO2: 69    /  pO2: 62    / HCO3: 51    / Base Excess: 23.3  /  SaO2: 94                    04-09 @ 07:01  -  04-10 @ 07:00  --------------------------------------------------------  IN: 3402.7 mL / OUT: 3648 mL / NET: -245.3 mL        Mode: AC/ CMV (Assist Control/ Continuous Mandatory Ventilation)  RR (machine): 30  FiO2: 80  PEEP: 8  ITime: 1  MAP: 16  PC: 20  PIP: 30        PHYSICAL EXAM:    GENERAL: sedated and intubated   HEAD:  Atraumatic, Normocephalic  EYES:  PERRLA, conjunctiva and sclera clear  MOUTH : ETT  NECK: Supple,  No JVD; Normal thyroid; Trachea midline LIJ  NERVOUS SYSTEM:  sedated  CHEST/LUNG: No chest deformity; Normal percussion bilaterally; No rhonchi, wheezing   HEART: Regular rate and rhythm; No murmurs, rubs, or gallops  ABDOMEN: Soft, Nontender, Nondistended; Bowel sounds present  EXTREMITIES:  b/l upper extremities edema +2,  no edema on lower legs. No clubbing, cyanosis   LYMPH: No lymphadenopathy noted  SKIN: No rashes  Good capillary refill      LABS:  CBC Full  -  ( 10 Apr 2021 03:55 )  WBC Count : 10.18 K/uL  RBC Count : 2.90 M/uL  Hemoglobin : 9.1 g/dL  Hematocrit : 28.8 %  Platelet Count - Automated : 184 K/uL  Mean Cell Volume : 99.3 fl  Mean Cell Hemoglobin : 31.4 pg  Mean Cell Hemoglobin Concentration : 31.6 gm/dL  Auto Neutrophil # : 8.45 K/uL  Auto Lymphocyte # : 1.22 K/uL  Auto Monocyte # : 0.20 K/uL  Auto Eosinophil # : 0.20 K/uL  Auto Basophil # : 0.00 K/uL  Auto Neutrophil % : 74.0 %  Auto Lymphocyte % : 12.0 %  Auto Monocyte % : 2.0 %  Auto Eosinophil % : 2.0 %  Auto Basophil % : 0.0 %    04-10    137  |  94<L>  |  20<H>  ----------------------------<  141<H>  3.8   |  39<H>  |  0.47<L>    Ca    8.3<L>      10 Apr 2021 12:50  Phos  4.7     04-10  Mg     2.1     04-10    TPro  5.4<L>  /  Alb  2.4<L>  /  TBili  0.9  /  DBili  x   /  AST  42<H>  /  ALT  53  /  AlkPhos  70  04-10            RADIOLOGY & ADDITIONAL STUDIES REVIEWED:  ***    [ ]GOALS OF CARE DISCUSSION WITH PATIENT/FAMILY/PROXY:    CRITICAL CARE TIME SPENT: 35 minutes   INTERVAL HPI/OVERNIGHT EVENTS: patient was having fever 100.8 rectal, s/p Tylenol , s/p 1 dose of vancomycin    PRESSORS: [ ] YES [x ] NO    Antimicrobial:    Cardiovascular:  acetaZOLAMIDE  IVPB 250 milliGRAM(s) IV Intermittent every 12 hours  furosemide   Injectable 40 milliGRAM(s) IV Push two times a day  hydrochlorothiazide 50 milliGRAM(s) Oral daily  phenylephrine    Infusion 1 MICROgram(s)/kG/Min IV Continuous <Continuous>    Pulmonary:  ALBUTerol    90 MICROgram(s) HFA Inhaler 2 Puff(s) Inhalation every 6 hours    Hematalogic:  aspirin  chewable 81 milliGRAM(s) Oral daily    Other:  acetaminophen    Suspension .. 650 milliGRAM(s) Oral every 6 hours PRN  albumin human 25% IVPB 50 milliLiter(s) IV Intermittent every 6 hours  ascorbic acid 1000 milliGRAM(s) Oral daily  chlorhexidine 2% Cloths 1 Application(s) Topical <User Schedule>  dexMEDEtomidine Infusion 1 MICROgram(s)/kG/Hr IV Continuous <Continuous>  fentaNYL   Infusion 4 MICROgram(s)/kG/Hr IV Continuous <Continuous>  insulin lispro (ADMELOG) corrective regimen sliding scale   SubCutaneous every 6 hours  LORazepam   Injectable 2 milliGRAM(s) IV Push every 12 hours  pantoprazole   Suspension 40 milliGRAM(s) Oral daily  polyethylene glycol 3350 17 Gram(s) Oral daily  predniSONE   Tablet 30 milliGRAM(s) Oral daily  propofol Infusion 20 MICROgram(s)/kG/Min IV Continuous <Continuous>  senna 2 Tablet(s) Oral at bedtime  sodium chloride 0.9% lock flush 10 milliLiter(s) IV Push every 1 hour PRN    acetaminophen    Suspension .. 650 milliGRAM(s) Oral every 6 hours PRN  acetaZOLAMIDE  IVPB 250 milliGRAM(s) IV Intermittent every 12 hours  albumin human 25% IVPB 50 milliLiter(s) IV Intermittent every 6 hours  ALBUTerol    90 MICROgram(s) HFA Inhaler 2 Puff(s) Inhalation every 6 hours  ascorbic acid 1000 milliGRAM(s) Oral daily  aspirin  chewable 81 milliGRAM(s) Oral daily  chlorhexidine 2% Cloths 1 Application(s) Topical <User Schedule>  dexMEDEtomidine Infusion 1 MICROgram(s)/kG/Hr IV Continuous <Continuous>  fentaNYL   Infusion 4 MICROgram(s)/kG/Hr IV Continuous <Continuous>  furosemide   Injectable 40 milliGRAM(s) IV Push two times a day  hydrochlorothiazide 50 milliGRAM(s) Oral daily  insulin lispro (ADMELOG) corrective regimen sliding scale   SubCutaneous every 6 hours  LORazepam   Injectable 2 milliGRAM(s) IV Push every 12 hours  pantoprazole   Suspension 40 milliGRAM(s) Oral daily  phenylephrine    Infusion 1 MICROgram(s)/kG/Min IV Continuous <Continuous>  polyethylene glycol 3350 17 Gram(s) Oral daily  predniSONE   Tablet 30 milliGRAM(s) Oral daily  propofol Infusion 20 MICROgram(s)/kG/Min IV Continuous <Continuous>  senna 2 Tablet(s) Oral at bedtime  sodium chloride 0.9% lock flush 10 milliLiter(s) IV Push every 1 hour PRN    Drug Dosing Weight  Height (cm): 167.6 (14 Mar 2021 12:03)  Weight (kg): 83.869 (14 Mar 2021 12:03)  BMI (kg/m2): 29.9 (14 Mar 2021 12:03)  BSA (m2): 1.93 (14 Mar 2021 12:03)    CENTRAL LINE: [ ] YES [ ] NO  LOCATION:         RIVERA: [ ] YES [ ] NO          A-LINE:  [ ] YES [ ] NO  LOCATION:             ICU Vital Signs Last 24 Hrs  T(C): 38.6 (11 Apr 2021 00:00), Max: 39.2 (10 Apr 2021 20:00)  T(F): 101.5 (11 Apr 2021 00:00), Max: 102.5 (10 Apr 2021 20:00)  HR: 92 (11 Apr 2021 01:00) (66 - 135)  BP: 102/54 (10 Apr 2021 08:00) (102/54 - 130/97)  BP(mean): 66 (10 Apr 2021 08:00) (66 - 105)  ABP: 97/50 (11 Apr 2021 01:00) (86/48 - 172/77)  ABP(mean): 64 (11 Apr 2021 01:00) (59 - 109)  RR: 30 (11 Apr 2021 01:00) (20 - 33)  SpO2: 88% (11 Apr 2021 01:00) (85% - 97%)      ABG - ( 10 Apr 2021 03:55 )  pH, Arterial: 7.48  pH, Blood: x     /  pCO2: 69    /  pO2: 62    / HCO3: 51    / Base Excess: 23.3  /  SaO2: 94                    04-09 @ 07:01  -  04-10 @ 07:00  --------------------------------------------------------  IN: 3402.7 mL / OUT: 3648 mL / NET: -245.3 mL        Mode: AC/ CMV (Assist Control/ Continuous Mandatory Ventilation)  RR (machine): 30  FiO2: 80  PEEP: 8  ITime: 1  MAP: 16  PC: 20  PIP: 30        PHYSICAL EXAM:    GENERAL: sedated and intubated   HEAD:  Atraumatic, Normocephalic  EYES:  PERRLA, conjunctiva and sclera clear  MOUTH : ETT  NECK: Supple,  No JVD; Normal thyroid; Trachea midline LIJ  NERVOUS SYSTEM:  sedated  CHEST/LUNG: No chest deformity; Normal percussion bilaterally; No rhonchi, wheezing   HEART: Regular rate and rhythm; No murmurs, rubs, or gallops  ABDOMEN: Soft, Nontender, Nondistended; Bowel sounds present  EXTREMITIES:  b/l upper extremities edema +2,  no edema on lower legs. No clubbing, cyanosis   LYMPH: No lymphadenopathy noted  SKIN: No rashes  Good capillary refill      LABS:  CBC Full  -  ( 10 Apr 2021 03:55 )  WBC Count : 10.18 K/uL  RBC Count : 2.90 M/uL  Hemoglobin : 9.1 g/dL  Hematocrit : 28.8 %  Platelet Count - Automated : 184 K/uL  Mean Cell Volume : 99.3 fl  Mean Cell Hemoglobin : 31.4 pg  Mean Cell Hemoglobin Concentration : 31.6 gm/dL  Auto Neutrophil # : 8.45 K/uL  Auto Lymphocyte # : 1.22 K/uL  Auto Monocyte # : 0.20 K/uL  Auto Eosinophil # : 0.20 K/uL  Auto Basophil # : 0.00 K/uL  Auto Neutrophil % : 74.0 %  Auto Lymphocyte % : 12.0 %  Auto Monocyte % : 2.0 %  Auto Eosinophil % : 2.0 %  Auto Basophil % : 0.0 %    04-10    137  |  94<L>  |  20<H>  ----------------------------<  141<H>  3.8   |  39<H>  |  0.47<L>    Ca    8.3<L>      10 Apr 2021 12:50  Phos  4.7     04-10  Mg     2.1     04-10    TPro  5.4<L>  /  Alb  2.4<L>  /  TBili  0.9  /  DBili  x   /  AST  42<H>  /  ALT  53  /  AlkPhos  70  04-10            RADIOLOGY & ADDITIONAL STUDIES REVIEWED:  yes    [ ]GOALS OF CARE DISCUSSION WITH PATIENT/FAMILY/PROXY:    CRITICAL CARE TIME SPENT: 35 minutes

## 2021-04-11 NOTE — PROGRESS NOTE ADULT - ASSESSMENT
Assessment and Recommendation:   Problem/Recommendation - 1:  Problem: COVID-19. Recommendation: isolation precautions  cont mechanical ventilation  taper FIO2 as tolerated  Taper pressors meds ass feasible  Wean as tolerated  pulmonary toilet  NGT nutrition  ICU management.   Monitor oxygen sat  Monitor LFT, LDH, CRP, D-Dimer, Ferritin and procalcitonin  Vit C, D and zinc supp  Montelukast 10 mgs po Qhs  IV steroids.  Daily CXR   DVT and GI PPX     Problem/Recommendation - 2:  ·  Problem: UTI (urinary tract infection).  Recommendation: F.U cultures   Off Antibiotics.     Problem/Recommendation - 3:  ·  Problem: Chest pain.  Recommendation: likely related to Covid-19 infection.     Problem/Recommendation - 4:  ·  Problem: Transaminitis.  Recommendation: monitor LFTs  GI F/U     Problem/Recommendation - 5:  ·  Problem: Pneumothorax.  Recommendation: s/p left sided chest tube  Thoracic sx follow up  Daily  CXR   Management per ICU    Assessment and Recommendation:   Problem/Recommendation - 1:  Problem: COVID-19. Recommendation: isolation precautions  cont mechanical ventilation  taper FIO2 as tolerated  Taper pressors meds as feasible  Wean as tolerated  pulmonary toilet  NGT nutrition  ICU management.   Monitor oxygen sat  Monitor LFT, LDH, CRP, D-Dimer, Ferritin and procalcitonin  Vit C, D and zinc supp  Montelukast 10 mgs po Qhs  IV steroids.  Daily CXR   DVT and GI PPX     Problem/Recommendation - 2:  ·  Problem: UTI (urinary tract infection).  Recommendation: F.U cultures   Off Antibiotics.     Problem/Recommendation - 3:  ·  Problem: Chest pain.  Recommendation: likely related to Covid-19 infection.     Problem/Recommendation - 4:  ·  Problem: Transaminitis.  Recommendation: monitor LFTs  GI F/U     Problem/Recommendation - 5:  ·  Problem: Pneumothorax.  Recommendation: s/p left sided chest tube  Thoracic sx follow up  Daily  CXR   Management per ICU

## 2021-04-11 NOTE — PROGRESS NOTE ADULT - ASSESSMENT
55M, no PMH, PSH appy and moody 1990s presented 3/14 with x9 days worsening cough, subjective fevers, and SOB, with x2-3 days dysuria and central, non-radiating, constant CP. Admitted to [facility] for acute hypoxic respiratory failure s/t covid pneumonia, transferred to ICU for requiring HFNC. now intubated and sedated since 3/29. Noted pneumothorax/pneumomediastinum, chest tube placed on 4/8 by thoracic surgery.     1. Acute hypoxic respiratory failure  2. ARDS 2/2 Covid pneumonia  3. Transaminitis  4. Prediabetes  5. Abnormal TSH    =================== Neuro============================  Alert and oriented x 3 at baseline   intubated and sedated 3/29 on Vent  restarted profopol, continue Fentanyl and off midazolam  Ativan 2mg IV to help taper off Versed     ================= Cardiovascular==========================  Hypertension  map above 60 goal  s/p Labetalol 20mg IVP for high BP  s/p Lopressor 5mg IVP x 1 4/6  BP controlled now    TACHYCARDIA:  HR in 80s-90s   continue monitoring     ================- Pulm=================================  Acute hypoxic respiratory failure: secondary to covid pneumonia  ARDS  -was on  HFNr then got intubated 3/29  -PC Vent setting : 18/30/70/10  -D-dimer initially elevated on presentation, now wnl  - ABG show metabolic alkalosis pattern with elevated bicarb   - Will decrease TV   - Patient might need tracheostomy based on his current Oxygen requirements and clinical condition overall, Will speak with family and consult Thoracic surgery  - Thoracic surgery following  -pulm. dr. Ryan    -Yisselivir was discontinued due to positive antibodies   - c/w supportive care   - trend COVID  markers  - Finished Dexamethasone   -on prednisone taper    Pneumothorax and pneumomediastinum:  -X-ray chest: tiny left apical pneumothorax  -Thoracic surgery consulted   -Chest tube placed 4/8 pigtail to wall suction.   -monitor output  -X-ray monitoring daily, PTX improving     ==================ID===================================  Covid pneumonia  -leukocytosis resolved  -low grade fever  -procal elevated  -plan as above     ================= Nephro================================  -pitting edema to both lower arms  -Electrolyte imbalance :  Phos 1.7, K 3.4, replaced with K-phos, K riders x 3  -Albumin 1.3, give Albumin 25% q6h x 48hr  -keep Cerda for now.   -monitor I/Os   -Net negative , Urine output monitoring  -on Lasix 40mg BID and HCTZ 50mg   - started Albumin x 2 days on 4/10  -monitor lytes, CMP    =================GI====================================  Transaminitis:   likely secondary to Covid   - and  on presentation   hepatitis panel -ve  -Improved  -continue to monitor LFT    diet:   NGT (3/29) with tube feed  bowel regimen   Last BM 4/9    ================ Heme==================================  Elevated d-dimer: likely secondary to Covid  -d-dimer 423 on presentation, now wnl  -continue prophylactic Lovenox 40 mg bid    =================Endocrine===============================  Prediabetes:    -A1c  5.8  -BS controlled  -continue HSS  -monitor FS while on steroids    Abnormal TSH:  -TSH level noted 0.26,   -repeat TSH 0.37 and Free T4 1.82    ================= Skin/Catheters============================  No rashes. Peripheral IV lines.   LIj placed 4/6    - =================Prophylaxis =============================  Lovenox for DVT proph  Protonix for  GI proph    ==================GOC==================================   FULL CODE    updated condition to family by resident  Spoke with patient/s wife Rosa and grand daughter, she wants her son to be the POC     55M, no PMH, PSH appy and moody 1990s presented 3/14 with x9 days worsening cough, subjective fevers, and SOB, with x2-3 days dysuria and central, non-radiating, constant CP. Admitted to [facility] for acute hypoxic respiratory failure s/t covid pneumonia, transferred to ICU for requiring HFNC. now intubated and sedated since 3/29. Noted pneumothorax/pneumomediastinum, chest tube placed on 4/8 by thoracic surgery.     1. Acute hypoxic respiratory failure  2. ARDS 2/2 Covid pneumonia  3. Transaminitis  4. Prediabetes  5. Abnormal TSH    =================== Neuro============================  Alert and oriented x 3 at baseline   intubated and sedated 3/29 on Vent  restarted profopol, continue Fentanyl and off midazolam  Ativan 2mg IV to help taper off Versed   Patient requiring high dose of propofol     ================= Cardiovascular==========================  Hypertension  map above 60 goal  s/p Labetalol 20mg IVP for high BP  s/p Lopressor 5mg IVP x 1 4/6  BP controlled now, requiring pheny     TACHYCARDIA:  HR in 80s-90s   continue monitoring     ================- Pulm=================================  Acute hypoxic respiratory failure: secondary to covid pneumonia  ARDS  -was on  HFNr then got intubated 3/29  -PC Vent setting : 18/30/70/10  -D-dimer initially elevated on presentation, now wnl  - Patient on increased FiO2 requirement, will monitor , Brayan have to defer trach plan for now   - Thoracic surgery following  - New infiltrate on CXR ,likely developing VAP, Will continue Zosyn, s/p 1 dose of Vanco   - MRSA negative from 3/26  -pulm. dr. Connor Saucedoivir was discontinued due to positive antibodies   - c/w supportive care   - trend COVID  markers  - Finished Dexamethasone   -on prednisone taper    Pneumothorax and pneumomediastinum:  -X-ray chest: tiny left apical pneumothorax  -Thoracic surgery consulted   -Chest tube placed 4/8 pigtail to wall suction.   -monitor output  -X-ray monitoring daily, PTX improving     ==================ID===================================  Covid pneumonia  -leukocytosis resolved  -low grade fever  -procal elevated  -plan as above     ================= Nephro================================  -pitting edema to both lower arms  -Electrolyte imbalance :  Phos 1.7, K 3.4, replaced with K-phos, K riders x 3  -Albumin 1.3, give Albumin 25% q6h x 48hr  -keep Cerda for now.   -monitor I/Os   -Net negative , Urine output monitoring  -D/C lasix , continue HCTZ 50mg   - started Albumin x 2 days on 4/10  -monitor lytes, CMP    =================GI====================================  Transaminitis:   likely secondary to Covid   - and  on presentation   hepatitis panel -ve  -Improved  -continue to monitor LFT    diet:   NGT (3/29) with tube feed  bowel regimen   Last BM 4/10    ================ Heme==================================  Elevated d-dimer: likely secondary to Covid  -d-dimer 423 on presentation, now wnl  -continue prophylactic Lovenox 40 mg bid    =================Endocrine===============================  Prediabetes:    -A1c  5.8  -BS controlled  -continue HSS  -monitor FS while on steroids    Abnormal TSH:  -TSH level noted 0.26,   -repeat TSH 0.37 and Free T4 1.82    ================= Skin/Catheters============================  No rashes. Peripheral IV lines.   LIj placed 4/6    - =================Prophylaxis =============================  Lovenox for DVT proph  Protonix for  GI proph    ==================GOC==================================   FULL CODE    updated condition to family by resident

## 2021-04-12 LAB
ALBUMIN SERPL ELPH-MCNC: 2.5 G/DL — LOW (ref 3.5–5)
ALP SERPL-CCNC: 106 U/L — SIGNIFICANT CHANGE UP (ref 40–120)
ALT FLD-CCNC: 53 U/L DA — SIGNIFICANT CHANGE UP (ref 10–60)
ANION GAP SERPL CALC-SCNC: 1 MMOL/L — LOW (ref 5–17)
ANION GAP SERPL CALC-SCNC: 2 MMOL/L — LOW (ref 5–17)
AST SERPL-CCNC: 27 U/L — SIGNIFICANT CHANGE UP (ref 10–40)
BASE EXCESS BLDA CALC-SCNC: 7.6 MMOL/L — HIGH (ref -2–3)
BASOPHILS # BLD AUTO: SIGNIFICANT CHANGE UP K/UL (ref 0–0.2)
BASOPHILS NFR BLD AUTO: SIGNIFICANT CHANGE UP % (ref 0–2)
BILIRUB SERPL-MCNC: 0.7 MG/DL — SIGNIFICANT CHANGE UP (ref 0.2–1.2)
BLOOD GAS COMMENTS ARTERIAL: SIGNIFICANT CHANGE UP
BUN SERPL-MCNC: 19 MG/DL — HIGH (ref 7–18)
BUN SERPL-MCNC: 21 MG/DL — HIGH (ref 7–18)
CALCIUM SERPL-MCNC: 8.2 MG/DL — LOW (ref 8.4–10.5)
CALCIUM SERPL-MCNC: 8.6 MG/DL — SIGNIFICANT CHANGE UP (ref 8.4–10.5)
CHLORIDE SERPL-SCNC: 102 MMOL/L — SIGNIFICANT CHANGE UP (ref 96–108)
CHLORIDE SERPL-SCNC: 104 MMOL/L — SIGNIFICANT CHANGE UP (ref 96–108)
CO2 SERPL-SCNC: 33 MMOL/L — HIGH (ref 22–31)
CO2 SERPL-SCNC: 33 MMOL/L — HIGH (ref 22–31)
CREAT SERPL-MCNC: 0.33 MG/DL — LOW (ref 0.5–1.3)
CREAT SERPL-MCNC: 0.47 MG/DL — LOW (ref 0.5–1.3)
CRP SERPL-MCNC: 265 MG/L — HIGH
D DIMER BLD IA.RAPID-MCNC: 1313 NG/ML DDU — HIGH
EOSINOPHIL # BLD AUTO: SIGNIFICANT CHANGE UP K/UL (ref 0–0.5)
EOSINOPHIL NFR BLD AUTO: SIGNIFICANT CHANGE UP % (ref 0–6)
FERRITIN SERPL-MCNC: 1317 NG/ML — HIGH (ref 30–400)
GLUCOSE BLDC GLUCOMTR-MCNC: 121 MG/DL — HIGH (ref 70–99)
GLUCOSE BLDC GLUCOMTR-MCNC: 124 MG/DL — HIGH (ref 70–99)
GLUCOSE BLDC GLUCOMTR-MCNC: 124 MG/DL — HIGH (ref 70–99)
GLUCOSE BLDC GLUCOMTR-MCNC: 142 MG/DL — HIGH (ref 70–99)
GLUCOSE SERPL-MCNC: 131 MG/DL — HIGH (ref 70–99)
GLUCOSE SERPL-MCNC: 144 MG/DL — HIGH (ref 70–99)
HCO3 BLDA-SCNC: 36 MMOL/L — HIGH (ref 21–28)
HCT VFR BLD CALC: 33.1 % — LOW (ref 39–50)
HGB BLD-MCNC: 10.6 G/DL — LOW (ref 13–17)
HOROWITZ INDEX BLDA+IHG-RTO: 70 — SIGNIFICANT CHANGE UP
IMM GRANULOCYTES NFR BLD AUTO: SIGNIFICANT CHANGE UP % (ref 0–1.5)
LYMPHOCYTES # BLD AUTO: SIGNIFICANT CHANGE UP % (ref 13–44)
LYMPHOCYTES # BLD AUTO: SIGNIFICANT CHANGE UP K/UL (ref 1–3.3)
MCHC RBC-ENTMCNC: 32 GM/DL — SIGNIFICANT CHANGE UP (ref 32–36)
MCHC RBC-ENTMCNC: 32 PG — SIGNIFICANT CHANGE UP (ref 27–34)
MCV RBC AUTO: 100 FL — SIGNIFICANT CHANGE UP (ref 80–100)
MONOCYTES # BLD AUTO: SIGNIFICANT CHANGE UP K/UL (ref 0–0.9)
MONOCYTES NFR BLD AUTO: SIGNIFICANT CHANGE UP % (ref 2–14)
NEUTROPHILS # BLD AUTO: SIGNIFICANT CHANGE UP K/UL (ref 1.8–7.4)
NEUTROPHILS NFR BLD AUTO: SIGNIFICANT CHANGE UP % (ref 43–77)
NRBC # BLD: 0 /100 WBCS — SIGNIFICANT CHANGE UP (ref 0–0)
PCO2 BLDA: 78 MMHG — CRITICAL HIGH (ref 35–48)
PH BLDA: 7.27 — LOW (ref 7.35–7.45)
PLATELET # BLD AUTO: 309 K/UL — SIGNIFICANT CHANGE UP (ref 150–400)
PO2 BLDA: 67 MMHG — LOW (ref 83–108)
POTASSIUM SERPL-MCNC: 3.2 MMOL/L — LOW (ref 3.5–5.3)
POTASSIUM SERPL-MCNC: 5.1 MMOL/L — SIGNIFICANT CHANGE UP (ref 3.5–5.3)
POTASSIUM SERPL-SCNC: 3.2 MMOL/L — LOW (ref 3.5–5.3)
POTASSIUM SERPL-SCNC: 5.1 MMOL/L — SIGNIFICANT CHANGE UP (ref 3.5–5.3)
PROT SERPL-MCNC: 6.2 G/DL — SIGNIFICANT CHANGE UP (ref 6–8.3)
RBC # BLD: 3.31 M/UL — LOW (ref 4.2–5.8)
RBC # FLD: 13.2 % — SIGNIFICANT CHANGE UP (ref 10.3–14.5)
SAO2 % BLDA: 95 % — SIGNIFICANT CHANGE UP
SODIUM SERPL-SCNC: 137 MMOL/L — SIGNIFICANT CHANGE UP (ref 135–145)
SODIUM SERPL-SCNC: 138 MMOL/L — SIGNIFICANT CHANGE UP (ref 135–145)
WBC # BLD: 11.09 K/UL — HIGH (ref 3.8–10.5)
WBC # FLD AUTO: 11.09 K/UL — HIGH (ref 3.8–10.5)

## 2021-04-12 PROCEDURE — 71045 X-RAY EXAM CHEST 1 VIEW: CPT | Mod: 26

## 2021-04-12 RX ORDER — POTASSIUM CHLORIDE 20 MEQ
40 PACKET (EA) ORAL ONCE
Refills: 0 | Status: COMPLETED | OUTPATIENT
Start: 2021-04-12 | End: 2021-04-12

## 2021-04-12 RX ORDER — PANTOPRAZOLE SODIUM 20 MG/1
40 TABLET, DELAYED RELEASE ORAL
Refills: 0 | Status: DISCONTINUED | OUTPATIENT
Start: 2021-04-12 | End: 2021-04-14

## 2021-04-12 RX ORDER — ACETAMINOPHEN 500 MG
1000 TABLET ORAL ONCE
Refills: 0 | Status: COMPLETED | OUTPATIENT
Start: 2021-04-12 | End: 2021-04-12

## 2021-04-12 RX ORDER — ENOXAPARIN SODIUM 100 MG/ML
40 INJECTION SUBCUTANEOUS
Refills: 0 | Status: DISCONTINUED | OUTPATIENT
Start: 2021-04-12 | End: 2021-04-13

## 2021-04-12 RX ORDER — POTASSIUM CHLORIDE 20 MEQ
40 PACKET (EA) ORAL
Refills: 0 | Status: DISCONTINUED | OUTPATIENT
Start: 2021-04-12 | End: 2021-04-12

## 2021-04-12 RX ORDER — POTASSIUM CHLORIDE 20 MEQ
20 PACKET (EA) ORAL ONCE
Refills: 0 | Status: COMPLETED | OUTPATIENT
Start: 2021-04-12 | End: 2021-04-12

## 2021-04-12 RX ORDER — PHENYLEPHRINE HYDROCHLORIDE 10 MG/ML
0.1 INJECTION INTRAVENOUS
Qty: 40 | Refills: 0 | Status: DISCONTINUED | OUTPATIENT
Start: 2021-04-12 | End: 2021-04-13

## 2021-04-12 RX ORDER — NOREPINEPHRINE BITARTRATE/D5W 8 MG/250ML
0.05 PLASTIC BAG, INJECTION (ML) INTRAVENOUS
Qty: 16 | Refills: 0 | Status: DISCONTINUED | OUTPATIENT
Start: 2021-04-12 | End: 2021-04-14

## 2021-04-12 RX ADMIN — ACETAZOLAMIDE 105 MILLIGRAM(S): 250 TABLET ORAL at 05:04

## 2021-04-12 RX ADMIN — Medication 40 MILLIEQUIVALENT(S): at 14:55

## 2021-04-12 RX ADMIN — Medication 1000 MILLIGRAM(S): at 12:05

## 2021-04-12 RX ADMIN — CHLORHEXIDINE GLUCONATE 1 APPLICATION(S): 213 SOLUTION TOPICAL at 05:04

## 2021-04-12 RX ADMIN — FENTANYL CITRATE 33.5 MICROGRAM(S)/KG/HR: 50 INJECTION INTRAVENOUS at 18:23

## 2021-04-12 RX ADMIN — Medication 2 MILLIGRAM(S): at 05:21

## 2021-04-12 RX ADMIN — ALBUTEROL 2 PUFF(S): 90 AEROSOL, METERED ORAL at 09:25

## 2021-04-12 RX ADMIN — Medication 20 MILLIGRAM(S): at 05:04

## 2021-04-12 RX ADMIN — Medication 2 MILLIGRAM(S): at 21:54

## 2021-04-12 RX ADMIN — POLYETHYLENE GLYCOL 3350 17 GRAM(S): 17 POWDER, FOR SOLUTION ORAL at 12:05

## 2021-04-12 RX ADMIN — Medication 100 MILLIEQUIVALENT(S): at 15:00

## 2021-04-12 RX ADMIN — ALBUTEROL 2 PUFF(S): 90 AEROSOL, METERED ORAL at 16:06

## 2021-04-12 RX ADMIN — Medication 2 MILLIGRAM(S): at 05:04

## 2021-04-12 RX ADMIN — DEXMEDETOMIDINE HYDROCHLORIDE IN 0.9% SODIUM CHLORIDE 21 MICROGRAM(S)/KG/HR: 4 INJECTION INTRAVENOUS at 18:24

## 2021-04-12 RX ADMIN — PIPERACILLIN AND TAZOBACTAM 25 GRAM(S): 4; .5 INJECTION, POWDER, LYOPHILIZED, FOR SOLUTION INTRAVENOUS at 13:16

## 2021-04-12 RX ADMIN — FENTANYL CITRATE 33.5 MICROGRAM(S)/KG/HR: 50 INJECTION INTRAVENOUS at 09:44

## 2021-04-12 RX ADMIN — Medication 3.93 MICROGRAM(S)/KG/MIN: at 10:50

## 2021-04-12 RX ADMIN — Medication 2 MILLIGRAM(S): at 18:24

## 2021-04-12 RX ADMIN — Medication 2 MILLIGRAM(S): at 13:16

## 2021-04-12 RX ADMIN — FENTANYL CITRATE 33.5 MICROGRAM(S)/KG/HR: 50 INJECTION INTRAVENOUS at 02:06

## 2021-04-12 RX ADMIN — Medication 81 MILLIGRAM(S): at 12:05

## 2021-04-12 RX ADMIN — Medication 1000 MILLIGRAM(S): at 06:43

## 2021-04-12 RX ADMIN — Medication 50 MILLIGRAM(S): at 05:05

## 2021-04-12 RX ADMIN — Medication 650 MILLIGRAM(S): at 13:50

## 2021-04-12 RX ADMIN — PIPERACILLIN AND TAZOBACTAM 25 GRAM(S): 4; .5 INJECTION, POWDER, LYOPHILIZED, FOR SOLUTION INTRAVENOUS at 05:04

## 2021-04-12 RX ADMIN — PIPERACILLIN AND TAZOBACTAM 25 GRAM(S): 4; .5 INJECTION, POWDER, LYOPHILIZED, FOR SOLUTION INTRAVENOUS at 21:54

## 2021-04-12 RX ADMIN — PANTOPRAZOLE SODIUM 40 MILLIGRAM(S): 20 TABLET, DELAYED RELEASE ORAL at 12:05

## 2021-04-12 RX ADMIN — SENNA PLUS 2 TABLET(S): 8.6 TABLET ORAL at 23:24

## 2021-04-12 RX ADMIN — Medication 650 MILLIGRAM(S): at 12:50

## 2021-04-12 RX ADMIN — ALBUTEROL 2 PUFF(S): 90 AEROSOL, METERED ORAL at 03:42

## 2021-04-12 RX ADMIN — ENOXAPARIN SODIUM 40 MILLIGRAM(S): 100 INJECTION SUBCUTANEOUS at 10:52

## 2021-04-12 RX ADMIN — Medication 2 MILLIGRAM(S): at 09:55

## 2021-04-12 RX ADMIN — ENOXAPARIN SODIUM 40 MILLIGRAM(S): 100 INJECTION SUBCUTANEOUS at 18:24

## 2021-04-12 RX ADMIN — Medication 400 MILLIGRAM(S): at 06:38

## 2021-04-12 RX ADMIN — ALBUTEROL 2 PUFF(S): 90 AEROSOL, METERED ORAL at 20:33

## 2021-04-12 RX ADMIN — DEXMEDETOMIDINE HYDROCHLORIDE IN 0.9% SODIUM CHLORIDE 21 MICROGRAM(S)/KG/HR: 4 INJECTION INTRAVENOUS at 10:50

## 2021-04-12 NOTE — PROGRESS NOTE ADULT - ASSESSMENT
55M, no PMH, PSH appy and moody 1990s presented 3/14 with x9 days worsening cough, subjective fevers, and SOB, with x2-3 days dysuria and central, non-radiating, constant CP. Admitted to [facility] for acute hypoxic respiratory failure s/t covid pneumonia, transferred to ICU for requiring HFNC. now intubated and sedated since 3/29. Noted pneumothorax/pneumomediastinum, chest tube placed on 4/8 by thoracic surgery.     1. Acute hypoxic respiratory failure  2. ARDS 2/2 Covid pneumonia  3. Transaminitis  4. Prediabetes  5. Abnormal TSH    =================== Neuro============================  Alert and oriented x 3 at baseline   intubated and sedated 3/29 on Vent  restarted profopol, continue Fentanyl and off midazolam  Ativan 2mg IV to help taper off Versed   Patient requiring high dose of propofol     ================= Cardiovascular==========================  Hypertension  map above 60 goal  s/p Labetalol 20mg IVP for high BP  s/p Lopressor 5mg IVP x 1 4/6  BP controlled now, requiring pheny     TACHYCARDIA:  HR in 80s-90s   continue monitoring     ================- Pulm=================================  Acute hypoxic respiratory failure: secondary to covid pneumonia  ARDS  -was on  HFNr then got intubated 3/29  -PC Vent setting : 18/30/70/10  -D-dimer initially elevated on presentation, now wnl  - Patient on increased FiO2 requirement, will monitor , Brayan have to defer trach plan for now   - Thoracic surgery following  - New infiltrate on CXR ,likely developing VAP, Will continue Zosyn, s/p 1 dose of Vanco   - MRSA negative from 3/26  -pulm. dr. Connor Saucedoivir was discontinued due to positive antibodies   - c/w supportive care   - trend COVID  markers  - Finished Dexamethasone   -on prednisone taper    Pneumothorax and pneumomediastinum:  -X-ray chest: tiny left apical pneumothorax  -Thoracic surgery consulted   -Chest tube placed 4/8 pigtail to wall suction.   -monitor output  -X-ray monitoring daily, PTX improving     ==================ID===================================  Covid pneumonia  -leukocytosis resolved  -low grade fever  -procal elevated  -plan as above     ================= Nephro================================  -pitting edema to both lower arms  -Electrolyte imbalance :  Phos 1.7, K 3.4, replaced with K-phos, K riders x 3  -Albumin 1.3, give Albumin 25% q6h x 48hr  -keep Cerda for now.   -monitor I/Os   -Net negative , Urine output monitoring  -D/C lasix , continue HCTZ 50mg   - started Albumin x 2 days on 4/10  -monitor lytes, CMP    =================GI====================================  Transaminitis:   likely secondary to Covid   - and  on presentation   hepatitis panel -ve  -Improved  -continue to monitor LFT    diet:   NGT (3/29) with tube feed  bowel regimen   Last BM 4/10    ================ Heme==================================  Elevated d-dimer: likely secondary to Covid  -d-dimer 423 on presentation, now wnl  -continue prophylactic Lovenox 40 mg bid    =================Endocrine===============================  Prediabetes:    -A1c  5.8  -BS controlled  -continue HSS  -monitor FS while on steroids    Abnormal TSH:  -TSH level noted 0.26,   -repeat TSH 0.37 and Free T4 1.82    ================= Skin/Catheters============================  No rashes. Peripheral IV lines.   LIj placed 4/6    - =================Prophylaxis =============================  Lovenox for DVT proph  Protonix for  GI proph    ==================GOC==================================   FULL CODE    updated condition to family by resident   55M, no PMH, PSH appy and moody 1990s presented 3/14 with x9 days worsening cough, subjective fevers, and SOB, with x2-3 days dysuria and central, non-radiating, constant CP. Admitted to [facility] for acute hypoxic respiratory failure s/t covid pneumonia, transferred to ICU for requiring HFNC. now intubated and sedated since 3/29. Noted pneumothorax/pneumomediastinum, chest tube placed on 4/8 by thoracic surgery.     1. Acute hypoxic respiratory failure  2. ARDS 2/2 Covid pneumonia  3. Transaminitis  4. Prediabetes  5. Abnormal TSH    =================== Neuro============================  Alert and oriented x 3 at baseline   intubated and sedated 3/29 on Vent  continue Fentanyl, Precedex and off midazolam  Ativan IV prn to help with ventilator synchrony,   Off propofol.    ================= Cardiovascular==========================  Hypertension  map above 60 goal  s/p Labetalol 20mg IVP for high BP  s/p Lopressor 5mg IVP x 1 4/6  BP controlled now, requiring phenylephrine , Will transition with Levophed     TACHYCARDIA:  HR in 80s-90s   continue monitoring     ================- Pulm=================================  Acute hypoxic respiratory failure: secondary to covid pneumonia  ARDS  -was on  HFNr then got intubated 3/29  -PC Vent setting : 18/30/70/10  -D-dimer initially elevated on presentation, Slowly rising again   - Patient on increased FiO2 requirement, will monitor , Will have to defer trach plan for now   - Thoracic surgery following  # Bacterial Pneumonia  - New infiltrate on CXR ,likely developing VAP, Will continue Zosyn, s/p 1 dose of Vanco   - MRSA negative from 3/26  -pulm. dr. Connor Kamara was discontinued due to positive antibodies   - c/w supportive care   - trend COVID  markers  - Finished Dexamethasone   -on prednisone taper    Pneumothorax and pneumomediastinum:  -X-ray chest: tiny left apical pneumothorax  -Thoracic surgery consulted   -Chest tube placed 4/8 pigtail to wall suction.   -monitor output  -X-ray monitoring daily, PTX resolved    ==================ID===================================  Covid pneumonia  -leukocytosis resolved  -low grade fever  -procal elevated  -plan as above     ================= Nephro================================  -pitting edema to both lower arms  -Electrolyte imbalance :  Phos 1.7, K 3.4, replaced with K-phos, K riders x 3  -Albumin 1.3, give Albumin 25% q6h x 48hr  -keep Cerda for now.   -monitor I/Os   -Net negative , Urine output monitoring  -D/C lasix and HCTZ  - Finished Albumin x 2 days on 4/10  -monitor lytes, CMP    =================GI====================================  Transaminitis:   likely secondary to Covid   - and  on presentation   hepatitis panel -ve  -Improved  -continue to monitor LFT    diet:   NGT (3/29) with tube feed  bowel regimen   Last BM 4/10  Currently on hold as patient was having increased residual     ================ Heme==================================  Elevated d-dimer: likely secondary to Covid  -d-dimer 423 on presentation, now wnl  -continue prophylactic Lovenox 40 mg bid    =================Endocrine===============================  Prediabetes:    -A1c  5.8  -BS controlled  -continue HSS  -monitor FS while on steroids    Abnormal TSH:  -TSH level noted 0.26,   -repeat TSH 0.37 and Free T4 1.82    ================= Skin/Catheters============================  No rashes. Peripheral IV lines.   LIj placed 4/6  RRA 3/29    - =================Prophylaxis =============================  Lovenox for DVT proph  Protonix for  GI proph    ==================GOC==================================   FULL CODE    updated condition to family by resident

## 2021-04-12 NOTE — PROGRESS NOTE ADULT - SUBJECTIVE AND OBJECTIVE BOX
Patient is a 55y old  Male who presents with a chief complaint of SOB (12 Apr 2021 08:01)  Patient is sedated, intubated on mechanical ventilation. Currently on pressors meds for BP support    INTERVAL HPI/OVERNIGHT EVENTS:      VITAL SIGNS:  T(F): 99 (04-12-21 @ 03:00)  HR: 88 (04-12-21 @ 08:53)  BP: 92/50 (04-12-21 @ 06:00)  RR: 30 (04-12-21 @ 06:30)  SpO2: 94% (04-12-21 @ 08:53)  Wt(kg): --  I&O's Detail    11 Apr 2021 07:01  -  12 Apr 2021 07:00  --------------------------------------------------------  IN:    Dexmedetomidine: 483 mL    Enteral Tube Flush: 150 mL    FentaNYL: 768.1 mL    Glucerna 1.5: 575 mL    IV PiggyBack: 100 mL    IV PiggyBack: 250 mL    IV PiggyBack: 50 mL    Phenylephrine: 62.9 mL    Phenylephrine: 15.7 mL    Propofol: 62.7 mL  Total IN: 2517.4 mL    OUT:    Chest Tube (mL): 20 mL    Indwelling Catheter - Urethral (mL): 3020 mL  Total OUT: 3040 mL    Total NET: -522.6 mL        Mode: AC/ CMV (Assist Control/ Continuous Mandatory Ventilation)  RR (machine): 30  FiO2: 70  PEEP: 8  ITime: 0.8  MAP: 16  PC: 20  PIP: 29        REVIEW OF SYSTEMS:    CONSTITUTIONAL:  No fevers, chills, sweats    HEENT:  Eyes:  No diplopia or blurred vision. ENT:  No earache, sore throat or runny nose.    CARDIOVASCULAR:  No pressure, squeezing, tightness, or heaviness about the chest; no palpitations.    RESPIRATORY:  Per HPI    GASTROINTESTINAL:  No abdominal pain, nausea, vomiting or diarrhea.    GENITOURINARY:  No dysuria, frequency or urgency.    NEUROLOGIC:  No paresthesias, fasciculations, seizures or weakness.    PSYCHIATRIC:  No disorder of thought or mood.      PHYSICAL EXAM:    Constitutional: Well developed and nourished  Eyes:Perrla  ENMT: normal  Neck:supple  Respiratory: good air entry  Cardiovascular: S1 S2 regular  Gastrointestinal: Soft, Non tender  Extremities: No edema  Vascular:normal  Neurological:Sedated  Musculoskeletal:Normal      MEDICATIONS  (STANDING):  ALBUTerol    90 MICROgram(s) HFA Inhaler 2 Puff(s) Inhalation every 6 hours  ascorbic acid 1000 milliGRAM(s) Oral daily  aspirin  chewable 81 milliGRAM(s) Oral daily  chlorhexidine 2% Cloths 1 Application(s) Topical <User Schedule>  dexMEDEtomidine Infusion 1 MICROgram(s)/kG/Hr (21 mL/Hr) IV Continuous <Continuous>  fentaNYL   Infusion 4 MICROgram(s)/kG/Hr (33.5 mL/Hr) IV Continuous <Continuous>  hydrochlorothiazide 50 milliGRAM(s) Oral daily  insulin lispro (ADMELOG) corrective regimen sliding scale   SubCutaneous every 6 hours  LORazepam   Injectable 2 milliGRAM(s) IV Push every 4 hours  pantoprazole   Suspension 40 milliGRAM(s) Oral daily  phenylephrine    Infusion 0.1 MICROgram(s)/kG/Min (3.15 mL/Hr) IV Continuous <Continuous>  piperacillin/tazobactam IVPB.. 3.375 Gram(s) IV Intermittent every 8 hours  polyethylene glycol 3350 17 Gram(s) Oral daily  senna 2 Tablet(s) Oral at bedtime    MEDICATIONS  (PRN):  acetaminophen    Suspension .. 650 milliGRAM(s) Oral every 6 hours PRN Temp greater or equal to 38C (100.4F)  sodium chloride 0.9% lock flush 10 milliLiter(s) IV Push every 1 hour PRN Pre/post blood products, medications, blood draw, and to maintain line patency      Allergies    No Known Allergies    Intolerances        LABS:                        10.6   11.09 )-----------( 309      ( 12 Apr 2021 04:26 )             33.1     04-12    137  |  102  |  19<H>  ----------------------------<  144<H>  3.2<L>   |  33<H>  |  0.33<L>    Ca    8.6      12 Apr 2021 04:26  Phos  3.1     04-11  Mg     2.0     04-11    TPro  6.2  /  Alb  2.5<L>  /  TBili  0.7  /  DBili  x   /  AST  27  /  ALT  53  /  AlkPhos  106  04-12        ABG - ( 12 Apr 2021 04:28 )  pH, Arterial: 7.27  pH, Blood: x     /  pCO2: 78    /  pO2: 67    / HCO3: 36    / Base Excess: 7.6   /  SaO2: 95        Patient is a 55y old  Male who presents with a chief complaint of SOB (12 Apr 2021 08:01)      INTERVAL HPI/OVERNIGHT EVENTS:      VITAL SIGNS:  T(F): 99 (04-12-21 @ 03:00)  HR: 88 (04-12-21 @ 08:53)  BP: 92/50 (04-12-21 @ 06:00)  RR: 30 (04-12-21 @ 06:30)  SpO2: 94% (04-12-21 @ 08:53)  Wt(kg): --  I&O's Detail    11 Apr 2021 07:01  -  12 Apr 2021 07:00  --------------------------------------------------------  IN:    Dexmedetomidine: 483 mL    Enteral Tube Flush: 150 mL    FentaNYL: 768.1 mL    Glucerna 1.5: 575 mL    IV PiggyBack: 100 mL    IV PiggyBack: 250 mL    IV PiggyBack: 50 mL    Phenylephrine: 62.9 mL    Phenylephrine: 15.7 mL    Propofol: 62.7 mL  Total IN: 2517.4 mL    OUT:    Chest Tube (mL): 20 mL    Indwelling Catheter - Urethral (mL): 3020 mL  Total OUT: 3040 mL    Total NET: -522.6 mL        Mode: AC/ CMV (Assist Control/ Continuous Mandatory Ventilation)  RR (machine): 30  FiO2: 70  PEEP: 8  ITime: 0.8  MAP: 16  PC: 20  PIP: 29        REVIEW OF SYSTEMS:    CONSTITUTIONAL:  No fevers, chills, sweats    HEENT:  Eyes:  No diplopia or blurred vision. ENT:  No earache, sore throat or runny nose.    CARDIOVASCULAR:  No pressure, squeezing, tightness, or heaviness about the chest; no palpitations.    RESPIRATORY:  Per HPI    GASTROINTESTINAL:  No abdominal pain, nausea, vomiting or diarrhea.    GENITOURINARY:  No dysuria, frequency or urgency.    NEUROLOGIC:  No paresthesias, fasciculations, seizures or weakness.    PSYCHIATRIC:  No disorder of thought or mood.      PHYSICAL EXAM:    Constitutional: Well developed and nourished  Eyes:Perrla  ENMT: normal  Neck:supple  Respiratory: good air entry  Cardiovascular: S1 S2 regular  Gastrointestinal: Soft, Non tender  Extremities: No edema  Vascular:normal  Neurological:Awake, alert,Ox3  Musculoskeletal:Normal      MEDICATIONS  (STANDING):  ALBUTerol    90 MICROgram(s) HFA Inhaler 2 Puff(s) Inhalation every 6 hours  ascorbic acid 1000 milliGRAM(s) Oral daily  aspirin  chewable 81 milliGRAM(s) Oral daily  chlorhexidine 2% Cloths 1 Application(s) Topical <User Schedule>  dexMEDEtomidine Infusion 1 MICROgram(s)/kG/Hr (21 mL/Hr) IV Continuous <Continuous>  fentaNYL   Infusion 4 MICROgram(s)/kG/Hr (33.5 mL/Hr) IV Continuous <Continuous>  hydrochlorothiazide 50 milliGRAM(s) Oral daily  insulin lispro (ADMELOG) corrective regimen sliding scale   SubCutaneous every 6 hours  LORazepam   Injectable 2 milliGRAM(s) IV Push every 4 hours  pantoprazole   Suspension 40 milliGRAM(s) Oral daily  phenylephrine    Infusion 0.1 MICROgram(s)/kG/Min (3.15 mL/Hr) IV Continuous <Continuous>  piperacillin/tazobactam IVPB.. 3.375 Gram(s) IV Intermittent every 8 hours  polyethylene glycol 3350 17 Gram(s) Oral daily  senna 2 Tablet(s) Oral at bedtime    MEDICATIONS  (PRN):  acetaminophen    Suspension .. 650 milliGRAM(s) Oral every 6 hours PRN Temp greater or equal to 38C (100.4F)  sodium chloride 0.9% lock flush 10 milliLiter(s) IV Push every 1 hour PRN Pre/post blood products, medications, blood draw, and to maintain line patency      Allergies    No Known Allergies    Intolerances        LABS:                        10.6   11.09 )-----------( 309      ( 12 Apr 2021 04:26 )             33.1     04-12    137  |  102  |  19<H>  ----------------------------<  144<H>  3.2<L>   |  33<H>  |  0.33<L>    Ca    8.6      12 Apr 2021 04:26  Phos  3.1     04-11  Mg     2.0     04-11    TPro  6.2  /  Alb  2.5<L>  /  TBili  0.7  /  DBili  x   /  AST  27  /  ALT  53  /  AlkPhos  106  04-12        ABG - ( 12 Apr 2021 04:28 )  pH, Arterial: 7.27  pH, Blood: x     /  pCO2: 78    /  pO2: 67    / HCO3: 36    / Base Excess: 7.6   /  SaO2: 95                    CAPILLARY BLOOD GLUCOSE      POCT Blood Glucose.: 142 mg/dL (12 Apr 2021 04:16)  POCT Blood Glucose.: 132 mg/dL (11 Apr 2021 22:41)  POCT Blood Glucose.: 169 mg/dL (11 Apr 2021 16:55)  POCT Blood Glucose.: 209 mg/dL (11 Apr 2021 11:28)    pro-bnp -- 04-12 @ 04:26     d-dimer 1313  04-12 @ 04:26  pro-bnp -- 04-09 @ 04:38     d-dimer 507  04-09 @ 04:38  pro-bnp -- 04-06 @ 05:03     d-dimer 363  04-06 @ 05:03      RADIOLOGY & ADDITIONAL TESTS:    CXR:    Ct scan chest:    ekg;    echo:            CAPILLARY BLOOD GLUCOSE      POCT Blood Glucose.: 142 mg/dL (12 Apr 2021 04:16)  POCT Blood Glucose.: 132 mg/dL (11 Apr 2021 22:41)  POCT Blood Glucose.: 169 mg/dL (11 Apr 2021 16:55)  POCT Blood Glucose.: 209 mg/dL (11 Apr 2021 11:28)    pro-bnp -- 04-12 @ 04:26     d-dimer 1313  04-12 @ 04:26  pro-bnp -- 04-09 @ 04:38     d-dimer 507  04-09 @ 04:38  pro-bnp -- 04-06 @ 05:03     d-dimer 363  04-06 @ 05:03      RADIOLOGY & ADDITIONAL TESTS:    CXR:  < from: Xray Chest 1 View- PORTABLE-Routine (Xray Chest 1 View- PORTABLE-Routine in AM.) (04.11.21 @ 09:17) >  IMPRESSION: No interval change compared to one day prior. No pneumothorax.    < end of copied text >    Ct scan chest:    ekg;    echo:

## 2021-04-12 NOTE — PROGRESS NOTE ADULT - SUBJECTIVE AND OBJECTIVE BOX
INTERVAL HPI/OVERNIGHT EVENTS: ***    PRESSORS: [ ] YES [ ] NO  WHICH:    ANTIBIOTICS:                  DATE STARTED:  ANTIBIOTICS:                  DATE STARTED:  ANTIBIOTICS:                  DATE STARTED:    Antimicrobial:  piperacillin/tazobactam IVPB.. 3.375 Gram(s) IV Intermittent every 8 hours    Cardiovascular:  hydrochlorothiazide 50 milliGRAM(s) Oral daily  phenylephrine    Infusion 0.1 MICROgram(s)/kG/Min IV Continuous <Continuous>    Pulmonary:  ALBUTerol    90 MICROgram(s) HFA Inhaler 2 Puff(s) Inhalation every 6 hours    Hematalogic:  aspirin  chewable 81 milliGRAM(s) Oral daily    Other:  acetaminophen    Suspension .. 650 milliGRAM(s) Oral every 6 hours PRN  ascorbic acid 1000 milliGRAM(s) Oral daily  chlorhexidine 2% Cloths 1 Application(s) Topical <User Schedule>  dexMEDEtomidine Infusion 1 MICROgram(s)/kG/Hr IV Continuous <Continuous>  fentaNYL   Infusion 4 MICROgram(s)/kG/Hr IV Continuous <Continuous>  insulin lispro (ADMELOG) corrective regimen sliding scale   SubCutaneous every 6 hours  LORazepam   Injectable 2 milliGRAM(s) IV Push every 4 hours  pantoprazole   Suspension 40 milliGRAM(s) Oral daily  polyethylene glycol 3350 17 Gram(s) Oral daily  senna 2 Tablet(s) Oral at bedtime  sodium chloride 0.9% lock flush 10 milliLiter(s) IV Push every 1 hour PRN    acetaminophen    Suspension .. 650 milliGRAM(s) Oral every 6 hours PRN  ALBUTerol    90 MICROgram(s) HFA Inhaler 2 Puff(s) Inhalation every 6 hours  ascorbic acid 1000 milliGRAM(s) Oral daily  aspirin  chewable 81 milliGRAM(s) Oral daily  chlorhexidine 2% Cloths 1 Application(s) Topical <User Schedule>  dexMEDEtomidine Infusion 1 MICROgram(s)/kG/Hr IV Continuous <Continuous>  fentaNYL   Infusion 4 MICROgram(s)/kG/Hr IV Continuous <Continuous>  hydrochlorothiazide 50 milliGRAM(s) Oral daily  insulin lispro (ADMELOG) corrective regimen sliding scale   SubCutaneous every 6 hours  LORazepam   Injectable 2 milliGRAM(s) IV Push every 4 hours  pantoprazole   Suspension 40 milliGRAM(s) Oral daily  phenylephrine    Infusion 0.1 MICROgram(s)/kG/Min IV Continuous <Continuous>  piperacillin/tazobactam IVPB.. 3.375 Gram(s) IV Intermittent every 8 hours  polyethylene glycol 3350 17 Gram(s) Oral daily  senna 2 Tablet(s) Oral at bedtime  sodium chloride 0.9% lock flush 10 milliLiter(s) IV Push every 1 hour PRN    Drug Dosing Weight  Height (cm): 167.6 (14 Mar 2021 12:03)  Weight (kg): 83.869 (14 Mar 2021 12:03)  BMI (kg/m2): 29.9 (14 Mar 2021 12:03)  BSA (m2): 1.93 (14 Mar 2021 12:03)    CENTRAL LINE: [ ] YES [ ] NO  LOCATION:   DATE INSERTED:  REMOVE: [ ] YES [ ] NO  EXPLAIN:    RIVERA: [ ] YES [ ] NO    DATE INSERTED:  REMOVE:  [ ] YES [ ] NO  EXPLAIN:    A-LINE:  [ ] YES [ ] NO  LOCATION:   DATE INSERTED:  REMOVE:  [ ] YES [ ] NO  EXPLAIN:    PMH -reviewed admission note, no change since admission  PAST MEDICAL & SURGICAL HISTORY:  No pertinent past medical history    S/P moody  1990s    S/P appendectomy  1990s        ICU Vital Signs Last 24 Hrs  T(C): 37.2 (12 Apr 2021 03:00), Max: 37.9 (11 Apr 2021 20:00)  T(F): 99 (12 Apr 2021 03:00), Max: 100.2 (11 Apr 2021 20:00)  HR: 115 (12 Apr 2021 06:30) (73 - 126)  BP: 92/50 (12 Apr 2021 06:00) (92/50 - 159/83)  BP(mean): 60 (12 Apr 2021 06:00) (60 - 99)  ABP: 116/59 (12 Apr 2021 06:30) (90/49 - 183/78)  ABP(mean): 76 (12 Apr 2021 06:30) (61 - 113)  RR: 30 (12 Apr 2021 06:30) (19 - 40)  SpO2: 93% (12 Apr 2021 06:30) (86% - 99%)      ABG - ( 12 Apr 2021 04:28 )  pH, Arterial: 7.27  pH, Blood: x     /  pCO2: 78    /  pO2: 67    / HCO3: 36    / Base Excess: 7.6   /  SaO2: 95                    04-11 @ 07:01  -  04-12 @ 07:00  --------------------------------------------------------  IN: 2517.4 mL / OUT: 3040 mL / NET: -522.6 mL        Mode: AC/ CMV (Assist Control/ Continuous Mandatory Ventilation)  RR (machine): 30  FiO2: 70  PEEP: 8  ITime: 0.8  MAP: 16  PC: 20  PIP: 31      PHYSICAL EXAM:    GENERAL: [ ]NAD, [ ]well-groomed, [ ]well-developed  HEAD:  [ ]Atraumatic, [ ]Normocephalic  EYES: [ ]EOMI, [ ]PERRLA, [ ]conjunctiva and sclera clear  ENMT: [ ]No tonsillar erythema, exudates, or enlargement; [ ]Moist mucous membranes, [ ]Good dentition, [ ]No lesions  NECK: [ ]Supple, normal appearance, [ ]No JVD; [ ]Normal thyroid; [ ]Trachea midline  NERVOUS SYSTEM:  [ ]Alert & Oriented X3, [ ]Good concentration; [ ]Motor Strength 5/5 B/L upper and lower extremities; [ ]DTRs 2+ intact and symmetric  CHEST/LUNG: [ ]No chest deformity; [ ]Normal percussion bilaterally; [ ]No rales, rhonchi, wheezing   HEART: [ ]Regular rate and rhythm; [ ]No murmurs, rubs, or gallops  ABDOMEN: [ ]Soft, Nontender, Nondistended; [ ]Bowel sounds present  EXTREMITIES:  [ ]2+ Peripheral Pulses, [ ]No clubbing, cyanosis, or edema  LYMPH: [ ]No lymphadenopathy noted  SKIN: [ ]No rashes or lesions; [ ]Good capillary refill      LABS:  CBC Full  -  ( 12 Apr 2021 04:26 )  WBC Count : 11.09 K/uL  RBC Count : 3.31 M/uL  Hemoglobin : 10.6 g/dL  Hematocrit : 33.1 %  Platelet Count - Automated : 309 K/uL  Mean Cell Volume : 100.0 fl  Mean Cell Hemoglobin : 32.0 pg  Mean Cell Hemoglobin Concentration : 32.0 gm/dL  Auto Neutrophil # : SEE NOTE K/uL  Auto Lymphocyte # : SEE NOTE K/uL  Auto Monocyte # : SEE NOTE K/uL  Auto Eosinophil # : SEE NOTE K/uL  Auto Basophil # : SEE NOTE K/uL  Auto Neutrophil % : SEE NOTE %  Auto Lymphocyte % : SEE NOTE %  Auto Monocyte % : SEE NOTE %  Auto Eosinophil % : SEE NOTE %  Auto Basophil % : SEE NOTE %    04-12    137  |  102  |  19<H>  ----------------------------<  144<H>  3.2<L>   |  33<H>  |  0.33<L>    Ca    8.6      12 Apr 2021 04:26  Phos  3.1     04-11  Mg     2.0     04-11    TPro  6.2  /  Alb  2.5<L>  /  TBili  0.7  /  DBili  x   /  AST  27  /  ALT  53  /  AlkPhos  106  04-12            RADIOLOGY & ADDITIONAL STUDIES REVIEWED:  ***    [ ]GOALS OF CARE DISCUSSION WITH PATIENT/FAMILY/PROXY:    CRITICAL CARE TIME SPENT: 35 minutes INTERVAL HPI/OVERNIGHT EVENTS: patient was having abdominal breathing at night, he was started on IV Ativan PRN to help with vent synchrony, Also found to have increased residual particles in NG, Also found to be in ETT    PRESSORS: [x ] YES [ ] NO  WHICH: Phenylephrine and Levophed     ANTIBIOTICS:                  DATE STARTED:  ANTIBIOTICS:                  DATE STARTED:  ANTIBIOTICS:                  DATE STARTED:    Antimicrobial:  piperacillin/tazobactam IVPB.. 3.375 Gram(s) IV Intermittent every 8 hours    Cardiovascular:  hydrochlorothiazide 50 milliGRAM(s) Oral daily  phenylephrine    Infusion 0.1 MICROgram(s)/kG/Min IV Continuous <Continuous>    Pulmonary:  ALBUTerol    90 MICROgram(s) HFA Inhaler 2 Puff(s) Inhalation every 6 hours    Hematalogic:  aspirin  chewable 81 milliGRAM(s) Oral daily    Other:  acetaminophen    Suspension .. 650 milliGRAM(s) Oral every 6 hours PRN  ascorbic acid 1000 milliGRAM(s) Oral daily  chlorhexidine 2% Cloths 1 Application(s) Topical <User Schedule>  dexMEDEtomidine Infusion 1 MICROgram(s)/kG/Hr IV Continuous <Continuous>  fentaNYL   Infusion 4 MICROgram(s)/kG/Hr IV Continuous <Continuous>  insulin lispro (ADMELOG) corrective regimen sliding scale   SubCutaneous every 6 hours  LORazepam   Injectable 2 milliGRAM(s) IV Push every 4 hours  pantoprazole   Suspension 40 milliGRAM(s) Oral daily  polyethylene glycol 3350 17 Gram(s) Oral daily  senna 2 Tablet(s) Oral at bedtime  sodium chloride 0.9% lock flush 10 milliLiter(s) IV Push every 1 hour PRN    acetaminophen    Suspension .. 650 milliGRAM(s) Oral every 6 hours PRN  ALBUTerol    90 MICROgram(s) HFA Inhaler 2 Puff(s) Inhalation every 6 hours  ascorbic acid 1000 milliGRAM(s) Oral daily  aspirin  chewable 81 milliGRAM(s) Oral daily  chlorhexidine 2% Cloths 1 Application(s) Topical <User Schedule>  dexMEDEtomidine Infusion 1 MICROgram(s)/kG/Hr IV Continuous <Continuous>  fentaNYL   Infusion 4 MICROgram(s)/kG/Hr IV Continuous <Continuous>  hydrochlorothiazide 50 milliGRAM(s) Oral daily  insulin lispro (ADMELOG) corrective regimen sliding scale   SubCutaneous every 6 hours  LORazepam   Injectable 2 milliGRAM(s) IV Push every 4 hours  pantoprazole   Suspension 40 milliGRAM(s) Oral daily  phenylephrine    Infusion 0.1 MICROgram(s)/kG/Min IV Continuous <Continuous>  piperacillin/tazobactam IVPB.. 3.375 Gram(s) IV Intermittent every 8 hours  polyethylene glycol 3350 17 Gram(s) Oral daily  senna 2 Tablet(s) Oral at bedtime  sodium chloride 0.9% lock flush 10 milliLiter(s) IV Push every 1 hour PRN    Drug Dosing Weight  Height (cm): 167.6 (14 Mar 2021 12:03)  Weight (kg): 83.869 (14 Mar 2021 12:03)  BMI (kg/m2): 29.9 (14 Mar 2021 12:03)  BSA (m2): 1.93 (14 Mar 2021 12:03)    CENTRAL LINE: [ x] YES [ ] NO  LOCATION: [facility]   DATE INSERTED: 4/6  REMOVE: [ ] YES [ ] NO  EXPLAIN:    RIVERA: [x ] YES [ ] NO    DATE INSERTED:  REMOVE:  [ ] YES [ ] NO  EXPLAIN:    A-LINE:  [x ] YES [ ] NO  LOCATION: [facility]  DATE INSERTED:  REMOVE:  [ ] YES [ ] NO  EXPLAIN:    PMH -reviewed admission note, no change since admission  PAST MEDICAL & SURGICAL HISTORY:  No pertinent past medical history    S/P moody  1990s    S/P appendectomy  1990s        ICU Vital Signs Last 24 Hrs  T(C): 37.2 (12 Apr 2021 03:00), Max: 37.9 (11 Apr 2021 20:00)  T(F): 99 (12 Apr 2021 03:00), Max: 100.2 (11 Apr 2021 20:00)  HR: 115 (12 Apr 2021 06:30) (73 - 126)  BP: 92/50 (12 Apr 2021 06:00) (92/50 - 159/83)  BP(mean): 60 (12 Apr 2021 06:00) (60 - 99)  ABP: 116/59 (12 Apr 2021 06:30) (90/49 - 183/78)  ABP(mean): 76 (12 Apr 2021 06:30) (61 - 113)  RR: 30 (12 Apr 2021 06:30) (19 - 40)  SpO2: 93% (12 Apr 2021 06:30) (86% - 99%)      ABG - ( 12 Apr 2021 04:28 )  pH, Arterial: 7.27  pH, Blood: x     /  pCO2: 78    /  pO2: 67    / HCO3: 36    / Base Excess: 7.6   /  SaO2: 95                    04-11 @ 07:01  -  04-12 @ 07:00  --------------------------------------------------------  IN: 2517.4 mL / OUT: 3040 mL / NET: -522.6 mL        Mode: AC/ CMV (Assist Control/ Continuous Mandatory Ventilation)  RR (machine): 30  FiO2: 70  PEEP: 8  ITime: 0.8  MAP: 16  PC: 20  PIP: 31      PHYSICAL EXAM:    GENERAL: sedated and intubated   HEAD:  Atraumatic, Normocephalic  EYES:  Dry eyes,   MOUTH : ETT  NECK: Supple,  No JVD; Normal thyroid; Trachea midline [facility]  NERVOUS SYSTEM:  sedated  CHEST/LUNG: B/L crackles,   HEART: Regular rate and rhythm; No murmurs, rubs, or gallops  ABDOMEN: Soft, Nontender, Nondistended; Bowel sounds present  EXTREMITIES:  b/l upper extremities edema +2,  no edema on lower legs. No clubbing, cyanosis   LYMPH: No lymphadenopathy noted  SKIN: No rashes  Good capillary refill      LABS:  CBC Full  -  ( 12 Apr 2021 04:26 )  WBC Count : 11.09 K/uL  RBC Count : 3.31 M/uL  Hemoglobin : 10.6 g/dL  Hematocrit : 33.1 %  Platelet Count - Automated : 309 K/uL  Mean Cell Volume : 100.0 fl  Mean Cell Hemoglobin : 32.0 pg  Mean Cell Hemoglobin Concentration : 32.0 gm/dL  Auto Neutrophil # : SEE NOTE K/uL  Auto Lymphocyte # : SEE NOTE K/uL  Auto Monocyte # : SEE NOTE K/uL  Auto Eosinophil # : SEE NOTE K/uL  Auto Basophil # : SEE NOTE K/uL  Auto Neutrophil % : SEE NOTE %  Auto Lymphocyte % : SEE NOTE %  Auto Monocyte % : SEE NOTE %  Auto Eosinophil % : SEE NOTE %  Auto Basophil % : SEE NOTE %    04-12    137  |  102  |  19<H>  ----------------------------<  144<H>  3.2<L>   |  33<H>  |  0.33<L>    Ca    8.6      12 Apr 2021 04:26  Phos  3.1     04-11  Mg     2.0     04-11    TPro  6.2  /  Alb  2.5<L>  /  TBili  0.7  /  DBili  x   /  AST  27  /  ALT  53  /  AlkPhos  106  04-12            RADIOLOGY & ADDITIONAL STUDIES REVIEWED:  yes    [ ]GOALS OF CARE DISCUSSION WITH PATIENT/FAMILY/PROXY:    CRITICAL CARE TIME SPENT: 35 minutes

## 2021-04-12 NOTE — PROGRESS NOTE ADULT - ATTENDING COMMENTS
55 yr old  man , non smoker with  moody 1990s presented 3/14 with x9 days worsening cough, subjective fevers, and SOB, with x2-3 days dysuria and central, non-radiating, constant CP. Admitted to medicine unit  for acute hypoxic respiratory failure secondary to pna from covid-19 infection .     Assessment:  1. Acute hypoxic respiratory failure  2 Covid-19 infection   3. Transaminitis  4. Prediabetes  5. Bilateral pneumothorax  6. Septic shock     Plan   -pat intubated 3/29   -Mechanical ventilation with lung protective strategy, titrate down fio2 as tolerated  -adjust vent as per ABG  -Increased leukocytosis and fevers, concern for new bacterial pneumonia  -Empiric antibiotics   -Now with worsneing shock again and increasing pressor requirement  -PTX  improved post chest tube placement  -Cont. precedex and fentanyl  -Ativan for benzo dependence  -isolation : contact and air borne   -monitor biomarkers daily, trending down   -Tube feedings, bowel regimen  -dvt/gi prophy  -hemodynamic monitoring   - May need trach/peg once clinically stable

## 2021-04-12 NOTE — PROGRESS NOTE ADULT - SUBJECTIVE AND OBJECTIVE BOX
Patient is a 55y old  Male who presents with a chief complaint of SOB (11 Apr 2021 10:51)    pt seen in icu [ x ], reg med floor [   ], bed [ x ], chair at bedside [   ], a+o x3 [  ], sedated [x  ],  nad [x  ]    andrea [ x ], ngt feed [x  ], et tube [ x ], cent line [x  ],          Allergies    No Known Allergies        Vitals    T(F): 99 (04-12-21 @ 03:00), Max: 100.2 (04-11-21 @ 20:00)  HR: 108 (04-12-21 @ 06:00) (73 - 126)  BP: 92/50 (04-12-21 @ 06:00) (92/50 - 159/83)  RR: 20 (04-12-21 @ 06:00) (19 - 40)  SpO2: 94% (04-12-21 @ 06:00) (86% - 99%)  Wt(kg): --  CAPILLARY BLOOD GLUCOSE      POCT Blood Glucose.: 142 mg/dL (12 Apr 2021 04:16)      Labs                          10.6   11.09 )-----------( 309      ( 12 Apr 2021 04:26 )             33.1       04-12    137  |  102  |  19<H>  ----------------------------<  144<H>  3.2<L>   |  33<H>  |  0.33<L>    Ca    8.6      12 Apr 2021 04:26  Phos  3.1     04-11  Mg     2.0     04-11    TPro  6.2  /  Alb  2.5<L>  /  TBili  0.7  /  DBili  x   /  AST  27  /  ALT  53  /  AlkPhos  106  04-12            .Urine Clean Catch (Midstream)  03-15 @ 00:52   No growth  --  --          Radiology Results      Meds    MEDICATIONS  (STANDING):  ALBUTerol    90 MICROgram(s) HFA Inhaler 2 Puff(s) Inhalation every 6 hours  ascorbic acid 1000 milliGRAM(s) Oral daily  aspirin  chewable 81 milliGRAM(s) Oral daily  chlorhexidine 2% Cloths 1 Application(s) Topical <User Schedule>  dexMEDEtomidine Infusion 1 MICROgram(s)/kG/Hr (21 mL/Hr) IV Continuous <Continuous>  fentaNYL   Infusion 4 MICROgram(s)/kG/Hr (33.5 mL/Hr) IV Continuous <Continuous>  hydrochlorothiazide 50 milliGRAM(s) Oral daily  insulin lispro (ADMELOG) corrective regimen sliding scale   SubCutaneous every 6 hours  LORazepam   Injectable 2 milliGRAM(s) IV Push every 4 hours  pantoprazole   Suspension 40 milliGRAM(s) Oral daily  phenylephrine    Infusion 0.1 MICROgram(s)/kG/Min (3.15 mL/Hr) IV Continuous <Continuous>  piperacillin/tazobactam IVPB.. 3.375 Gram(s) IV Intermittent every 8 hours  polyethylene glycol 3350 17 Gram(s) Oral daily  predniSONE   Tablet 20 milliGRAM(s) Oral daily  senna 2 Tablet(s) Oral at bedtime      MEDICATIONS  (PRN):  acetaminophen    Suspension .. 650 milliGRAM(s) Oral every 6 hours PRN Temp greater or equal to 38C (100.4F)  sodium chloride 0.9% lock flush 10 milliLiter(s) IV Push every 1 hour PRN Pre/post blood products, medications, blood draw, and to maintain line patency      Physical Exam    Neuro :  no focal deficits  Respiratory: CTA B/L  CV: RRR, S1S2, no murmurs,   Abdominal: Soft, NT, ND +BS,  Extremities: No edema, + peripheral pulses      ASSESSMENT    Hypoxemia 2nd to covid pna   transaminitis  prediabetes  h/o appendectomy  cholecystectomy        PLAN    contact and airborne isolation  d/c remdesevir given covid ab positive noted   completed dexamethasone   started pulse steroids for 3 days - 250mg solumedrol bid now tapered to 30mg daily  cont asa, vit c,    cont albuterol inhaler   pulm f/u  procalcitonin, D-dimer, crp, ldh, ferritin, lactate noted ,    tmx 102.5  cont tylenol prn,   cont robitussin prn   pt on fentanyl, phenylephrine, midazolam and precedex drip  s/p intubation 3/29/21  O2 sat (85% - 97%) mech vent  O2 via mech vent and taper fio2 as tolerated   serial abg's   vent mgmt as per icu  xray 3/19/21 with pneumomediastinum  rept cxr with New trace right apical pneumothorax. New mild left apical pneumothorax. Grossly stable small pneumomediastinum.  Soft tissue emphysema at the neck bases bilaterally. Grossly stable bilateral pulmonary infiltrates noted.   cxr 2/24 with No evidence of pneumothorax can be appreciated on the available image. This may be related to patient positioning. Evidence of pneumomediastinum and subcutaneous emphysema in the lower neck is again noted. There are patchy bibasilar infiltrates and elevated right hemidiaphragm noted.   cxr 3/29/21 with No significant change bilateral infiltrates. There is a small simple left apical pneumothorax. No significant pleural effusion. Bilateral subcutaneous emphysema similar to prior.   pt intubated 6AM 3/29/21,   XRs on 7AM and 5PM with no obvious increase of ptx  cxr 4/4/21 with Improving bilateral airspace disease noted    cxr 4/8/21 with Small left pneumothorax noted above.  thoracic surg f/u   s/p L chest tube placement  F/u CXR post placement, monitor site of placement   Daily CXR  Monitor O2 status   lispro ss   prognosis poor  cont current meds         Patient is a 55y old  Male who presents with a chief complaint of SOB (11 Apr 2021 10:51)    pt seen in icu [ x ], reg med floor [   ], bed [ x ], chair at bedside [   ], a+o x3 [  ], sedated [x  ],  nad [x  ]    andrea [ x ], ngt feed [x  ], et tube [ x ], cent line [x  ],          Allergies    No Known Allergies        Vitals    T(F): 99 (04-12-21 @ 03:00), Max: 100.2 (04-11-21 @ 20:00)  HR: 108 (04-12-21 @ 06:00) (73 - 126)  BP: 92/50 (04-12-21 @ 06:00) (92/50 - 159/83)  RR: 20 (04-12-21 @ 06:00) (19 - 40)  SpO2: 94% (04-12-21 @ 06:00) (86% - 99%)  Wt(kg): --  CAPILLARY BLOOD GLUCOSE      POCT Blood Glucose.: 142 mg/dL (12 Apr 2021 04:16)      Labs                          10.6   11.09 )-----------( 309      ( 12 Apr 2021 04:26 )             33.1       04-12    137  |  102  |  19<H>  ----------------------------<  144<H>  3.2<L>   |  33<H>  |  0.33<L>    Ca    8.6      12 Apr 2021 04:26  Phos  3.1     04-11  Mg     2.0     04-11    TPro  6.2  /  Alb  2.5<L>  /  TBili  0.7  /  DBili  x   /  AST  27  /  ALT  53  /  AlkPhos  106  04-12            .Urine Clean Catch (Midstream)  03-15 @ 00:52   No growth  --  --          Radiology Results      < from: Xray Chest 1 View- PORTABLE-Routine (Xray Chest 1 View- PORTABLE-Routine in AM.) (04.11.21 @ 09:17) >  4/10/2021 at 10:01 AM: The tip of the endotracheal tube is above the lev. The NG tube is in the stomach. Left IJ line tip in the SVC. Left chest tube again noted without pneumothorax. Slight shifting of bilateral airspace disease. Small left effusion and/or atelectasis.    4/10/2021 at 7:10 PM: Lines and tubes are unchanged in good position. Improving bilateral airspace disease. No pneumothorax.    4/11/2021 at 8:39 AM: No interval change compared to one day prior. No pneumothorax.    IMPRESSION: No interval change compared to one day prior. No pneumothorax.    < end of copied text >      Meds    MEDICATIONS  (STANDING):  ALBUTerol    90 MICROgram(s) HFA Inhaler 2 Puff(s) Inhalation every 6 hours  ascorbic acid 1000 milliGRAM(s) Oral daily  aspirin  chewable 81 milliGRAM(s) Oral daily  chlorhexidine 2% Cloths 1 Application(s) Topical <User Schedule>  dexMEDEtomidine Infusion 1 MICROgram(s)/kG/Hr (21 mL/Hr) IV Continuous <Continuous>  fentaNYL   Infusion 4 MICROgram(s)/kG/Hr (33.5 mL/Hr) IV Continuous <Continuous>  hydrochlorothiazide 50 milliGRAM(s) Oral daily  insulin lispro (ADMELOG) corrective regimen sliding scale   SubCutaneous every 6 hours  LORazepam   Injectable 2 milliGRAM(s) IV Push every 4 hours  pantoprazole   Suspension 40 milliGRAM(s) Oral daily  phenylephrine    Infusion 0.1 MICROgram(s)/kG/Min (3.15 mL/Hr) IV Continuous <Continuous>  piperacillin/tazobactam IVPB.. 3.375 Gram(s) IV Intermittent every 8 hours  polyethylene glycol 3350 17 Gram(s) Oral daily  predniSONE   Tablet 20 milliGRAM(s) Oral daily  senna 2 Tablet(s) Oral at bedtime      MEDICATIONS  (PRN):  acetaminophen    Suspension .. 650 milliGRAM(s) Oral every 6 hours PRN Temp greater or equal to 38C (100.4F)  sodium chloride 0.9% lock flush 10 milliLiter(s) IV Push every 1 hour PRN Pre/post blood products, medications, blood draw, and to maintain line patency      Physical Exam    Neuro :  no focal deficits  Respiratory: CTA B/L  CV: RRR, S1S2, no murmurs,   Abdominal: Soft, NT, ND +BS,  Extremities: No edema, + peripheral pulses      ASSESSMENT    Hypoxemia 2nd to covid pna   transaminitis  prediabetes  h/o appendectomy  cholecystectomy        PLAN    contact and airborne isolation  d/c remdesevir given covid ab positive noted   completed dexamethasone   started pulse steroids for 3 days - 250mg solumedrol bid now tapered to 20mg daily  cont asa, vit c,    cont albuterol inhaler   pulm f/u  procalcitonin, D-dimer, crp, ldh, ferritin, lactate noted ,    tmx 100.2  cont tylenol prn,   cont robitussin prn   pt on fentanyl, phenylephrine, and precedex drip  s/p intubation 3/29/21  O2 sat (86% - 99%) mech vent  O2 via mech vent and taper fio2 as tolerated   serial abg's   vent mgmt as per icu  xray 3/19/21 with pneumomediastinum  rept cxr with New trace right apical pneumothorax. New mild left apical pneumothorax. Grossly stable small pneumomediastinum.  Soft tissue emphysema at the neck bases bilaterally. Grossly stable bilateral pulmonary infiltrates noted.   cxr 2/24 with No evidence of pneumothorax can be appreciated on the available image. This may be related to patient positioning. Evidence of pneumomediastinum and subcutaneous emphysema in the lower neck is again noted. There are patchy bibasilar infiltrates and elevated right hemidiaphragm noted.   cxr 3/29/21 with No significant change bilateral infiltrates. There is a small simple left apical pneumothorax. No significant pleural effusion. Bilateral subcutaneous emphysema similar to prior.   pt intubated 6AM 3/29/21,   XRs on 7AM and 5PM with no obvious increase of ptx  cxr 4/4/21 with Improving bilateral airspace disease noted    cxr 4/8/21 with Small left pneumothorax noted above.  thoracic surg f/u   s/p L chest tube placement  CXR 4/11/21 with : No interval change compared to one day prior. No pneumothorax noted above.   Daily CXR  Monitor O2 status   pt started on zosyn  lispro ss   prognosis poor  cont current meds

## 2021-04-12 NOTE — PROGRESS NOTE ADULT - ASSESSMENT
Assessment and Recommendation:   Problem/Recommendation - 1:  Problem: COVID-19. Recommendation: isolation precautions  cont mechanical ventilation  taper FIO2 as tolerated  Taper pressors meds as feasible  Wean as tolerated  pulmonary toilet  NGT nutrition  ICU management.   Monitor oxygen sat  Monitor LFT, LDH, CRP, D-Dimer, Ferritin and procalcitonin  Vit C, D and zinc supp  Montelukast 10 mgs po Qhs  IV steroids.  Daily CXR   DVT and GI PPX     Problem/Recommendation - 2:  ·  Problem: UTI (urinary tract infection).  Recommendation: F.U cultures   Off Antibiotics.     Problem/Recommendation - 3:  ·  Problem: Chest pain.  Recommendation: likely related to Covid-19 infection.     Problem/Recommendation - 4:  ·  Problem: Transaminitis.  Recommendation: monitor LFTs  GI F/U     Problem/Recommendation - 5:  ·  Problem: Pneumothorax.  Recommendation: s/p left sided chest tube  Thoracic sx follow up  Daily  CXR   Management per ICU

## 2021-04-13 LAB
ALBUMIN SERPL ELPH-MCNC: 1.7 G/DL — LOW (ref 3.5–5)
ALBUMIN SERPL ELPH-MCNC: 1.9 G/DL — LOW (ref 3.5–5)
ALP SERPL-CCNC: 83 U/L — SIGNIFICANT CHANGE UP (ref 40–120)
ALP SERPL-CCNC: 88 U/L — SIGNIFICANT CHANGE UP (ref 40–120)
ALT FLD-CCNC: 34 U/L DA — SIGNIFICANT CHANGE UP (ref 10–60)
ALT FLD-CCNC: 36 U/L DA — SIGNIFICANT CHANGE UP (ref 10–60)
ANION GAP SERPL CALC-SCNC: 2 MMOL/L — LOW (ref 5–17)
ANION GAP SERPL CALC-SCNC: 4 MMOL/L — LOW (ref 5–17)
AST SERPL-CCNC: 18 U/L — SIGNIFICANT CHANGE UP (ref 10–40)
AST SERPL-CCNC: 22 U/L — SIGNIFICANT CHANGE UP (ref 10–40)
BASE EXCESS BLDA CALC-SCNC: 9.7 MMOL/L — HIGH (ref -2–3)
BASE EXCESS BLDV CALC-SCNC: -1.4 MMOL/L — SIGNIFICANT CHANGE UP
BASOPHILS # BLD AUTO: SIGNIFICANT CHANGE UP K/UL (ref 0–0.2)
BASOPHILS NFR BLD AUTO: SIGNIFICANT CHANGE UP % (ref 0–2)
BILIRUB SERPL-MCNC: 0.5 MG/DL — SIGNIFICANT CHANGE UP (ref 0.2–1.2)
BILIRUB SERPL-MCNC: 0.7 MG/DL — SIGNIFICANT CHANGE UP (ref 0.2–1.2)
BLOOD GAS COMMENTS ARTERIAL: SIGNIFICANT CHANGE UP
BLOOD GAS COMMENTS, VENOUS: SIGNIFICANT CHANGE UP
BUN SERPL-MCNC: 18 MG/DL — SIGNIFICANT CHANGE UP (ref 7–18)
BUN SERPL-MCNC: 20 MG/DL — HIGH (ref 7–18)
CALCIUM SERPL-MCNC: 8 MG/DL — LOW (ref 8.4–10.5)
CALCIUM SERPL-MCNC: 8.2 MG/DL — LOW (ref 8.4–10.5)
CHLORIDE SERPL-SCNC: 102 MMOL/L — SIGNIFICANT CHANGE UP (ref 96–108)
CHLORIDE SERPL-SCNC: 102 MMOL/L — SIGNIFICANT CHANGE UP (ref 96–108)
CO2 SERPL-SCNC: 33 MMOL/L — HIGH (ref 22–31)
CO2 SERPL-SCNC: 37 MMOL/L — HIGH (ref 22–31)
CREAT SERPL-MCNC: 0.4 MG/DL — LOW (ref 0.5–1.3)
CREAT SERPL-MCNC: 0.47 MG/DL — LOW (ref 0.5–1.3)
CRP SERPL-MCNC: 295 MG/L — HIGH
EOSINOPHIL # BLD AUTO: SIGNIFICANT CHANGE UP K/UL (ref 0–0.5)
EOSINOPHIL NFR BLD AUTO: SIGNIFICANT CHANGE UP % (ref 0–6)
FERRITIN SERPL-MCNC: 1297 NG/ML — HIGH (ref 30–400)
GLUCOSE BLDC GLUCOMTR-MCNC: 133 MG/DL — HIGH (ref 70–99)
GLUCOSE BLDC GLUCOMTR-MCNC: 136 MG/DL — HIGH (ref 70–99)
GLUCOSE BLDC GLUCOMTR-MCNC: 141 MG/DL — HIGH (ref 70–99)
GLUCOSE BLDC GLUCOMTR-MCNC: 160 MG/DL — HIGH (ref 70–99)
GLUCOSE SERPL-MCNC: 127 MG/DL — HIGH (ref 70–99)
GLUCOSE SERPL-MCNC: 153 MG/DL — HIGH (ref 70–99)
HCO3 BLDA-SCNC: 38 MMOL/L — HIGH (ref 21–28)
HCO3 BLDV-SCNC: 26 MMOL/L — SIGNIFICANT CHANGE UP (ref 22–29)
HCT VFR BLD CALC: 30.9 % — LOW (ref 39–50)
HGB BLD-MCNC: 9.7 G/DL — LOW (ref 13–17)
HOROWITZ INDEX BLDA+IHG-RTO: 70 — SIGNIFICANT CHANGE UP
HOROWITZ INDEX BLDV+IHG-RTO: 100 — SIGNIFICANT CHANGE UP
IMM GRANULOCYTES NFR BLD AUTO: SIGNIFICANT CHANGE UP % (ref 0–1.5)
LYMPHOCYTES # BLD AUTO: SIGNIFICANT CHANGE UP % (ref 13–44)
LYMPHOCYTES # BLD AUTO: SIGNIFICANT CHANGE UP K/UL (ref 1–3.3)
MAGNESIUM SERPL-MCNC: 1.9 MG/DL — SIGNIFICANT CHANGE UP (ref 1.6–2.6)
MCHC RBC-ENTMCNC: 31.4 GM/DL — LOW (ref 32–36)
MCHC RBC-ENTMCNC: 31.9 PG — SIGNIFICANT CHANGE UP (ref 27–34)
MCV RBC AUTO: 101.6 FL — HIGH (ref 80–100)
MONOCYTES # BLD AUTO: SIGNIFICANT CHANGE UP K/UL (ref 0–0.9)
MONOCYTES NFR BLD AUTO: SIGNIFICANT CHANGE UP % (ref 2–14)
NEUTROPHILS # BLD AUTO: SIGNIFICANT CHANGE UP K/UL (ref 1.8–7.4)
NEUTROPHILS NFR BLD AUTO: SIGNIFICANT CHANGE UP % (ref 43–77)
NRBC # BLD: 0 /100 WBCS — SIGNIFICANT CHANGE UP (ref 0–0)
PCO2 BLDA: 65 MMHG — HIGH (ref 35–48)
PCO2 BLDV: 56 MMHG — HIGH (ref 42–55)
PH BLDA: 7.37 — SIGNIFICANT CHANGE UP (ref 7.35–7.45)
PH BLDV: 7.28 — LOW (ref 7.32–7.43)
PHOSPHATE SERPL-MCNC: 3.5 MG/DL — SIGNIFICANT CHANGE UP (ref 2.5–4.5)
PLATELET # BLD AUTO: 372 K/UL — SIGNIFICANT CHANGE UP (ref 150–400)
PO2 BLDA: 92 MMHG — SIGNIFICANT CHANGE UP (ref 83–108)
PO2 BLDV: 60 MMHG — SIGNIFICANT CHANGE UP
POTASSIUM SERPL-MCNC: 3.7 MMOL/L — SIGNIFICANT CHANGE UP (ref 3.5–5.3)
POTASSIUM SERPL-MCNC: 3.7 MMOL/L — SIGNIFICANT CHANGE UP (ref 3.5–5.3)
POTASSIUM SERPL-SCNC: 3.7 MMOL/L — SIGNIFICANT CHANGE UP (ref 3.5–5.3)
POTASSIUM SERPL-SCNC: 3.7 MMOL/L — SIGNIFICANT CHANGE UP (ref 3.5–5.3)
PROCALCITONIN SERPL-MCNC: 0.94 NG/ML — HIGH (ref 0.02–0.1)
PROT SERPL-MCNC: 5.5 G/DL — LOW (ref 6–8.3)
PROT SERPL-MCNC: 5.8 G/DL — LOW (ref 6–8.3)
RBC # BLD: 3.04 M/UL — LOW (ref 4.2–5.8)
RBC # FLD: 13.4 % — SIGNIFICANT CHANGE UP (ref 10.3–14.5)
SAO2 % BLDA: SIGNIFICANT CHANGE UP %
SAO2 % BLDV: 95.5 % — SIGNIFICANT CHANGE UP
SODIUM SERPL-SCNC: 139 MMOL/L — SIGNIFICANT CHANGE UP (ref 135–145)
SODIUM SERPL-SCNC: 141 MMOL/L — SIGNIFICANT CHANGE UP (ref 135–145)
WBC # BLD: 8.73 K/UL — SIGNIFICANT CHANGE UP (ref 3.8–10.5)
WBC # FLD AUTO: 8.73 K/UL — SIGNIFICANT CHANGE UP (ref 3.8–10.5)

## 2021-04-13 PROCEDURE — 71045 X-RAY EXAM CHEST 1 VIEW: CPT | Mod: 26

## 2021-04-13 PROCEDURE — 99232 SBSQ HOSP IP/OBS MODERATE 35: CPT | Mod: 57

## 2021-04-13 RX ORDER — MEROPENEM 1 G/30ML
INJECTION INTRAVENOUS
Refills: 0 | Status: DISCONTINUED | OUTPATIENT
Start: 2021-04-13 | End: 2021-04-18

## 2021-04-13 RX ORDER — MEROPENEM 1 G/30ML
1000 INJECTION INTRAVENOUS ONCE
Refills: 0 | Status: COMPLETED | OUTPATIENT
Start: 2021-04-13 | End: 2021-04-13

## 2021-04-13 RX ORDER — PHENYLEPHRINE HYDROCHLORIDE 10 MG/ML
0.1 INJECTION INTRAVENOUS
Qty: 160 | Refills: 0 | Status: DISCONTINUED | OUTPATIENT
Start: 2021-04-13 | End: 2021-04-14

## 2021-04-13 RX ORDER — CHLORHEXIDINE GLUCONATE 213 G/1000ML
15 SOLUTION TOPICAL
Refills: 0 | Status: DISCONTINUED | OUTPATIENT
Start: 2021-04-13 | End: 2021-04-23

## 2021-04-13 RX ORDER — ACETAMINOPHEN 500 MG
1000 TABLET ORAL ONCE
Refills: 0 | Status: COMPLETED | OUTPATIENT
Start: 2021-04-13 | End: 2021-04-13

## 2021-04-13 RX ORDER — ENOXAPARIN SODIUM 100 MG/ML
40 INJECTION SUBCUTANEOUS
Refills: 0 | Status: DISCONTINUED | OUTPATIENT
Start: 2021-04-13 | End: 2021-04-14

## 2021-04-13 RX ORDER — IBUPROFEN 200 MG
600 TABLET ORAL EVERY 6 HOURS
Refills: 0 | Status: DISCONTINUED | OUTPATIENT
Start: 2021-04-13 | End: 2021-04-15

## 2021-04-13 RX ORDER — MEROPENEM 1 G/30ML
1000 INJECTION INTRAVENOUS EVERY 8 HOURS
Refills: 0 | Status: DISCONTINUED | OUTPATIENT
Start: 2021-04-13 | End: 2021-04-18

## 2021-04-13 RX ADMIN — Medication 50 MILLIGRAM(S): at 06:18

## 2021-04-13 RX ADMIN — Medication 1 MILLIGRAM(S): at 23:30

## 2021-04-13 RX ADMIN — ALBUTEROL 2 PUFF(S): 90 AEROSOL, METERED ORAL at 20:21

## 2021-04-13 RX ADMIN — PIPERACILLIN AND TAZOBACTAM 25 GRAM(S): 4; .5 INJECTION, POWDER, LYOPHILIZED, FOR SOLUTION INTRAVENOUS at 05:22

## 2021-04-13 RX ADMIN — PIPERACILLIN AND TAZOBACTAM 25 GRAM(S): 4; .5 INJECTION, POWDER, LYOPHILIZED, FOR SOLUTION INTRAVENOUS at 21:45

## 2021-04-13 RX ADMIN — Medication 400 MILLIGRAM(S): at 00:30

## 2021-04-13 RX ADMIN — Medication 600 MILLIGRAM(S): at 12:59

## 2021-04-13 RX ADMIN — Medication 1: at 17:03

## 2021-04-13 RX ADMIN — ENOXAPARIN SODIUM 40 MILLIGRAM(S): 100 INJECTION SUBCUTANEOUS at 06:00

## 2021-04-13 RX ADMIN — DEXMEDETOMIDINE HYDROCHLORIDE IN 0.9% SODIUM CHLORIDE 21 MICROGRAM(S)/KG/HR: 4 INJECTION INTRAVENOUS at 14:06

## 2021-04-13 RX ADMIN — DEXMEDETOMIDINE HYDROCHLORIDE IN 0.9% SODIUM CHLORIDE 21 MICROGRAM(S)/KG/HR: 4 INJECTION INTRAVENOUS at 21:41

## 2021-04-13 RX ADMIN — PANTOPRAZOLE SODIUM 40 MILLIGRAM(S): 20 TABLET, DELAYED RELEASE ORAL at 05:22

## 2021-04-13 RX ADMIN — CHLORHEXIDINE GLUCONATE 15 MILLILITER(S): 213 SOLUTION TOPICAL at 17:02

## 2021-04-13 RX ADMIN — Medication 2 MILLIGRAM(S): at 02:23

## 2021-04-13 RX ADMIN — MEROPENEM 100 MILLIGRAM(S): 1 INJECTION INTRAVENOUS at 11:57

## 2021-04-13 RX ADMIN — CHLORHEXIDINE GLUCONATE 1 APPLICATION(S): 213 SOLUTION TOPICAL at 05:17

## 2021-04-13 RX ADMIN — PIPERACILLIN AND TAZOBACTAM 25 GRAM(S): 4; .5 INJECTION, POWDER, LYOPHILIZED, FOR SOLUTION INTRAVENOUS at 14:03

## 2021-04-13 RX ADMIN — DEXMEDETOMIDINE HYDROCHLORIDE IN 0.9% SODIUM CHLORIDE 21 MICROGRAM(S)/KG/HR: 4 INJECTION INTRAVENOUS at 09:58

## 2021-04-13 RX ADMIN — ALBUTEROL 2 PUFF(S): 90 AEROSOL, METERED ORAL at 09:23

## 2021-04-13 RX ADMIN — FENTANYL CITRATE 33.5 MICROGRAM(S)/KG/HR: 50 INJECTION INTRAVENOUS at 17:01

## 2021-04-13 RX ADMIN — Medication 2 MILLIGRAM(S): at 09:58

## 2021-04-13 RX ADMIN — SENNA PLUS 2 TABLET(S): 8.6 TABLET ORAL at 21:45

## 2021-04-13 RX ADMIN — POLYETHYLENE GLYCOL 3350 17 GRAM(S): 17 POWDER, FOR SOLUTION ORAL at 12:00

## 2021-04-13 RX ADMIN — Medication 1000 MILLIGRAM(S): at 00:45

## 2021-04-13 RX ADMIN — Medication 600 MILLIGRAM(S): at 23:30

## 2021-04-13 RX ADMIN — FENTANYL CITRATE 33.5 MICROGRAM(S)/KG/HR: 50 INJECTION INTRAVENOUS at 09:58

## 2021-04-13 RX ADMIN — Medication 1000 MILLIGRAM(S): at 11:57

## 2021-04-13 RX ADMIN — MEROPENEM 100 MILLIGRAM(S): 1 INJECTION INTRAVENOUS at 21:49

## 2021-04-13 RX ADMIN — Medication 2 MILLIGRAM(S): at 14:03

## 2021-04-13 RX ADMIN — Medication 3.93 MICROGRAM(S)/KG/MIN: at 14:05

## 2021-04-13 RX ADMIN — Medication 600 MILLIGRAM(S): at 11:59

## 2021-04-13 RX ADMIN — Medication 1 DROP(S): at 11:57

## 2021-04-13 RX ADMIN — Medication 600 MILLIGRAM(S): at 22:30

## 2021-04-13 RX ADMIN — Medication 1 MILLIGRAM(S): at 17:01

## 2021-04-13 RX ADMIN — ALBUTEROL 2 PUFF(S): 90 AEROSOL, METERED ORAL at 15:47

## 2021-04-13 RX ADMIN — Medication 2 MILLIGRAM(S): at 05:21

## 2021-04-13 RX ADMIN — ENOXAPARIN SODIUM 40 MILLIGRAM(S): 100 INJECTION SUBCUTANEOUS at 21:41

## 2021-04-13 RX ADMIN — ALBUTEROL 2 PUFF(S): 90 AEROSOL, METERED ORAL at 03:15

## 2021-04-13 RX ADMIN — Medication 81 MILLIGRAM(S): at 12:00

## 2021-04-13 NOTE — PROGRESS NOTE ADULT - SUBJECTIVE AND OBJECTIVE BOX
Patient is a 55y old  Male who presents with a chief complaint of SOB (14 Apr 2021 11:45)  patient is intubated, sedated on mechanical ventilation. Clinically unchanged.    INTERVAL HPI/OVERNIGHT EVENTS:      VITAL SIGNS:  T(F): 101.2 (04-14-21 @ 06:00)  HR: 100 (04-14-21 @ 09:30)  BP: --  RR: 32 (04-14-21 @ 09:30)  SpO2: 93% (04-14-21 @ 09:30)  Wt(kg): --  I&O's Detail    13 Apr 2021 07:01  -  14 Apr 2021 07:00  --------------------------------------------------------  IN:    Dexmedetomidine: 504 mL    Enteral Tube Flush: 210 mL    FentaNYL: 772.8 mL    Glucerna 1.5: 360 mL    IV PiggyBack: 200 mL    IV PiggyBack: 200 mL    Norepinephrine: 81 mL    Phenylephrine: 165.2 mL    Phenylephrine: 89 mL  Total IN: 2582 mL    OUT:    Chest Tube (mL): 25 mL    Incontinent per Condom Catheter (mL): 280 mL    Indwelling Catheter - Urethral (mL): 400 mL    Phenylephrine: 0 mL  Total OUT: 705 mL    Total NET: 1877 mL      14 Apr 2021 07:01  -  14 Apr 2021 11:52  --------------------------------------------------------  IN:    Dexmedetomidine: 21 mL    FentaNYL: 33.6 mL    Glucerna 1.5: 20 mL    Phenylephrine: 23.6 mL  Total IN: 98.2 mL    OUT:    Norepinephrine: 0 mL  Total OUT: 0 mL    Total NET: 98.2 mL        Mode: AC/ CMV (Assist Control/ Continuous Mandatory Ventilation)  RR (machine): 30  TV (machine): 380  FiO2: 60  PEEP: 8  ITime: 1  MAP: 17  PC: 20  PIP: 38        REVIEW OF SYSTEMS:    CONSTITUTIONAL:  No fevers, chills, sweats    HEENT:  Eyes:  No diplopia or blurred vision. ENT:  No earache, sore throat or runny nose.    CARDIOVASCULAR:  No pressure, squeezing, tightness, or heaviness about the chest; no palpitations.    RESPIRATORY:  Per HPI    GASTROINTESTINAL:  No abdominal pain, nausea, vomiting or diarrhea.    GENITOURINARY:  No dysuria, frequency or urgency.    NEUROLOGIC:  No paresthesias, fasciculations, seizures or weakness.    PSYCHIATRIC:  No disorder of thought or mood.      PHYSICAL EXAM:    Constitutional: Well developed and nourished  Eyes:Perrla  ENMT: normal  Neck:supple  Respiratory: good air entry  Cardiovascular: S1 S2 regular  Gastrointestinal: Soft, Non tender  Extremities: No edema  Vascular:normal  Neurological:Awake, alert,Ox3  Musculoskeletal:Normal      MEDICATIONS  (STANDING):  ALBUTerol    90 MICROgram(s) HFA Inhaler 2 Puff(s) Inhalation every 6 hours  ascorbic acid 1000 milliGRAM(s) Oral daily  aspirin  chewable 81 milliGRAM(s) Oral daily  chlorhexidine 0.12% Liquid 15 milliLiter(s) Oral Mucosa two times a day  chlorhexidine 2% Cloths 1 Application(s) Topical <User Schedule>  enoxaparin Injectable 40 milliGRAM(s) SubCutaneous two times a day  fentaNYL   Infusion 4 MICROgram(s)/kG/Hr (33.5 mL/Hr) IV Continuous <Continuous>  insulin lispro (ADMELOG) corrective regimen sliding scale   SubCutaneous every 6 hours  lactulose Syrup 15 Gram(s) Oral two times a day  meropenem  IVPB 1000 milliGRAM(s) IV Intermittent every 8 hours  meropenem  IVPB      midazolam Infusion 0.02 mG/kG/Hr (1.68 mL/Hr) IV Continuous <Continuous>  midodrine 5 milliGRAM(s) Oral every 8 hours  pantoprazole  Injectable 40 milliGRAM(s) IV Push daily  phenylephrine    Infusion 0.1 MICROgram(s)/kG/Min (3.15 mL/Hr) IV Continuous <Continuous>  polyethylene glycol 3350 17 Gram(s) Oral daily  senna 2 Tablet(s) Oral at bedtime    MEDICATIONS  (PRN):  acetaminophen    Suspension .. 650 milliGRAM(s) Oral every 6 hours PRN Temp greater or equal to 38C (100.4F)  artificial  tears Solution 1 Drop(s) Both EYES every 4 hours PRN Dry Eyes  ibuprofen  Suspension. 600 milliGRAM(s) Enteral Tube every 6 hours PRN Temp greater or equal to 38C (100.4F)  LORazepam     Tablet 1 milliGRAM(s) Oral every 4 hours PRN Agitation  sodium chloride 0.9% lock flush 10 milliLiter(s) IV Push every 1 hour PRN Pre/post blood products, medications, blood draw, and to maintain line patency      Allergies    No Known Allergies    Intolerances        LABS:                        9.9    11.35 )-----------( 491      ( 14 Apr 2021 03:50 )             31.8     04-14    142  |  102  |  18  ----------------------------<  143<H>  3.3<L>   |  40<H>  |  0.44<L>    Ca    8.4      14 Apr 2021 03:50  Phos  3.0     04-14  Mg     2.1     04-14    TPro  6.0  /  Alb  1.9<L>  /  TBili  0.6  /  DBili  x   /  AST  27  /  ALT  33  /  AlkPhos  95  04-14    PT/INR - ( 14 Apr 2021 03:50 )   PT: 15.3 sec;   INR: 1.30 ratio         PTT - ( 14 Apr 2021 03:50 )  PTT:34.2 sec    ABG - ( 14 Apr 2021 05:56 )  pH, Arterial: 7.31  pH, Blood: x     /  pCO2: 84    /  pO2: 80    / HCO3: 42    / Base Excess: 12.2  /  SaO2: 98                    CAPILLARY BLOOD GLUCOSE      POCT Blood Glucose.: 153 mg/dL (14 Apr 2021 10:40)  POCT Blood Glucose.: 147 mg/dL (14 Apr 2021 05:26)  POCT Blood Glucose.: 136 mg/dL (13 Apr 2021 23:09)  POCT Blood Glucose.: 160 mg/dL (13 Apr 2021 16:46)    pro-bnp -- 04-12 @ 04:26     d-dimer 1313  04-12 @ 04:26  pro-bnp -- 04-09 @ 04:38     d-dimer 507  04-09 @ 04:38      RADIOLOGY & ADDITIONAL TESTS:    CXR:    Ct scan chest:    ekg;    echo:

## 2021-04-13 NOTE — PROGRESS NOTE ADULT - SUBJECTIVE AND OBJECTIVE BOX
INTERVAL HPI/OVERNIGHT EVENTS: ***    PRESSORS: [ ] YES [ ] NO  WHICH:    ANTIBIOTICS:                  DATE STARTED:  ANTIBIOTICS:                  DATE STARTED:  ANTIBIOTICS:                  DATE STARTED:    Antimicrobial:  piperacillin/tazobactam IVPB.. 3.375 Gram(s) IV Intermittent every 8 hours    Cardiovascular:  hydrochlorothiazide 50 milliGRAM(s) Oral daily  norepinephrine Infusion 0.05 MICROgram(s)/kG/Min IV Continuous <Continuous>  phenylephrine    Infusion 0.1 MICROgram(s)/kG/Min IV Continuous <Continuous>    Pulmonary:  ALBUTerol    90 MICROgram(s) HFA Inhaler 2 Puff(s) Inhalation every 6 hours    Hematalogic:  aspirin  chewable 81 milliGRAM(s) Oral daily  enoxaparin Injectable 40 milliGRAM(s) SubCutaneous two times a day    Other:  acetaminophen    Suspension .. 650 milliGRAM(s) Oral every 6 hours PRN  artificial  tears Solution 1 Drop(s) Both EYES every 4 hours PRN  ascorbic acid 1000 milliGRAM(s) Oral daily  chlorhexidine 2% Cloths 1 Application(s) Topical <User Schedule>  dexMEDEtomidine Infusion 1 MICROgram(s)/kG/Hr IV Continuous <Continuous>  fentaNYL   Infusion 4 MICROgram(s)/kG/Hr IV Continuous <Continuous>  insulin lispro (ADMELOG) corrective regimen sliding scale   SubCutaneous every 6 hours  LORazepam   Injectable 2 milliGRAM(s) IV Push every 4 hours  pantoprazole    Tablet 40 milliGRAM(s) Oral before breakfast  polyethylene glycol 3350 17 Gram(s) Oral daily  senna 2 Tablet(s) Oral at bedtime  sodium chloride 0.9% lock flush 10 milliLiter(s) IV Push every 1 hour PRN    acetaminophen    Suspension .. 650 milliGRAM(s) Oral every 6 hours PRN  ALBUTerol    90 MICROgram(s) HFA Inhaler 2 Puff(s) Inhalation every 6 hours  artificial  tears Solution 1 Drop(s) Both EYES every 4 hours PRN  ascorbic acid 1000 milliGRAM(s) Oral daily  aspirin  chewable 81 milliGRAM(s) Oral daily  chlorhexidine 2% Cloths 1 Application(s) Topical <User Schedule>  dexMEDEtomidine Infusion 1 MICROgram(s)/kG/Hr IV Continuous <Continuous>  enoxaparin Injectable 40 milliGRAM(s) SubCutaneous two times a day  fentaNYL   Infusion 4 MICROgram(s)/kG/Hr IV Continuous <Continuous>  hydrochlorothiazide 50 milliGRAM(s) Oral daily  insulin lispro (ADMELOG) corrective regimen sliding scale   SubCutaneous every 6 hours  LORazepam   Injectable 2 milliGRAM(s) IV Push every 4 hours  norepinephrine Infusion 0.05 MICROgram(s)/kG/Min IV Continuous <Continuous>  pantoprazole    Tablet 40 milliGRAM(s) Oral before breakfast  phenylephrine    Infusion 0.1 MICROgram(s)/kG/Min IV Continuous <Continuous>  piperacillin/tazobactam IVPB.. 3.375 Gram(s) IV Intermittent every 8 hours  polyethylene glycol 3350 17 Gram(s) Oral daily  senna 2 Tablet(s) Oral at bedtime  sodium chloride 0.9% lock flush 10 milliLiter(s) IV Push every 1 hour PRN    Drug Dosing Weight  Height (cm): 167.6 (14 Mar 2021 12:03)  Weight (kg): 83.869 (14 Mar 2021 12:03)  BMI (kg/m2): 29.9 (14 Mar 2021 12:03)  BSA (m2): 1.93 (14 Mar 2021 12:03)    CENTRAL LINE: [ ] YES [ ] NO  LOCATION:   DATE INSERTED:  REMOVE: [ ] YES [ ] NO  EXPLAIN:    RIVERA: [ ] YES [ ] NO    DATE INSERTED:  REMOVE:  [ ] YES [ ] NO  EXPLAIN:    A-LINE:  [ ] YES [ ] NO  LOCATION:   DATE INSERTED:  REMOVE:  [ ] YES [ ] NO  EXPLAIN:    PMH -reviewed admission note, no change since admission  PAST MEDICAL & SURGICAL HISTORY:  No pertinent past medical history    S/P moody  1990s    S/P appendectomy  1990s        ICU Vital Signs Last 24 Hrs  T(C): 37.7 (13 Apr 2021 07:00), Max: 38.9 (12 Apr 2021 20:00)  T(F): 99.9 (13 Apr 2021 07:00), Max: 102 (12 Apr 2021 20:00)  HR: 117 (13 Apr 2021 07:00) (73 - 150)  BP: 154/79 (12 Apr 2021 17:00) (74/38 - 154/79)  BP(mean): 96 (12 Apr 2021 17:00) (46 - 101)  ABP: 117/57 (13 Apr 2021 07:00) (85/52 - 149/68)  ABP(mean): 76 (13 Apr 2021 07:00) (62 - 94)  RR: 30 (13 Apr 2021 07:00) (16 - 32)  SpO2: 96% (13 Apr 2021 07:00) (90% - 98%)      ABG - ( 13 Apr 2021 04:33 )  pH, Arterial: 7.37  pH, Blood: x     /  pCO2: 65    /  pO2: 92    / HCO3: 38    / Base Excess: 9.7   /  SaO2: NR                    04-12 @ 07:01  -  04-13 @ 07:00  --------------------------------------------------------  IN: 2245.8 mL / OUT: 1635 mL / NET: 610.8 mL        Mode: AC/ CMV (Assist Control/ Continuous Mandatory Ventilation)  RR (machine): 30  FiO2: 70  PEEP: 8  ITime: 1  MAP: 15  PC: 20  PIP: 22      PHYSICAL EXAM:    GENERAL: [ ]NAD, [ ]well-groomed, [ ]well-developed  HEAD:  [ ]Atraumatic, [ ]Normocephalic  EYES: [ ]EOMI, [ ]PERRLA, [ ]conjunctiva and sclera clear  ENMT: [ ]No tonsillar erythema, exudates, or enlargement; [ ]Moist mucous membranes, [ ]Good dentition, [ ]No lesions  NECK: [ ]Supple, normal appearance, [ ]No JVD; [ ]Normal thyroid; [ ]Trachea midline  NERVOUS SYSTEM:  [ ]Alert & Oriented X3, [ ]Good concentration; [ ]Motor Strength 5/5 B/L upper and lower extremities; [ ]DTRs 2+ intact and symmetric  CHEST/LUNG: [ ]No chest deformity; [ ]Normal percussion bilaterally; [ ]No rales, rhonchi, wheezing   HEART: [ ]Regular rate and rhythm; [ ]No murmurs, rubs, or gallops  ABDOMEN: [ ]Soft, Nontender, Nondistended; [ ]Bowel sounds present  EXTREMITIES:  [ ]2+ Peripheral Pulses, [ ]No clubbing, cyanosis, or edema  LYMPH: [ ]No lymphadenopathy noted  SKIN: [ ]No rashes or lesions; [ ]Good capillary refill      LABS:  CBC Full  -  ( 13 Apr 2021 04:25 )  WBC Count : 8.73 K/uL  RBC Count : 3.04 M/uL  Hemoglobin : 9.7 g/dL  Hematocrit : 30.9 %  Platelet Count - Automated : 372 K/uL  Mean Cell Volume : 101.6 fl  Mean Cell Hemoglobin : 31.9 pg  Mean Cell Hemoglobin Concentration : 31.4 gm/dL  Auto Neutrophil # : SEE NOTE K/uL  Auto Lymphocyte # : SEE NOTE K/uL  Auto Monocyte # : SEE NOTE K/uL  Auto Eosinophil # : SEE NOTE K/uL  Auto Basophil # : SEE NOTE K/uL  Auto Neutrophil % : SEE NOTE %  Auto Lymphocyte % : SEE NOTE %  Auto Monocyte % : SEE NOTE %  Auto Eosinophil % : SEE NOTE %  Auto Basophil % : SEE NOTE %    04-13    139  |  102  |  18  ----------------------------<  127<H>  3.7   |  33<H>  |  0.40<L>    Ca    8.2<L>      13 Apr 2021 04:25    TPro  5.8<L>  /  Alb  1.9<L>  /  TBili  0.7  /  DBili  x   /  AST  18  /  ALT  36  /  AlkPhos  83  04-13            RADIOLOGY & ADDITIONAL STUDIES REVIEWED:  ***    [ ]GOALS OF CARE DISCUSSION WITH PATIENT/FAMILY/PROXY:    CRITICAL CARE TIME SPENT: 35 minutes INTERVAL HPI/OVERNIGHT EVENTS: Patient spiking fevers,     PRESSORS: [x ] YES [ ] NO  WHICH: Levophed    ANTIBIOTICS:                  DATE STARTED:  ANTIBIOTICS:                  DATE STARTED:  ANTIBIOTICS:                  DATE STARTED:    Antimicrobial:  piperacillin/tazobactam IVPB.. 3.375 Gram(s) IV Intermittent every 8 hours    Cardiovascular:  hydrochlorothiazide 50 milliGRAM(s) Oral daily  norepinephrine Infusion 0.05 MICROgram(s)/kG/Min IV Continuous <Continuous>  phenylephrine    Infusion 0.1 MICROgram(s)/kG/Min IV Continuous <Continuous>    Pulmonary:  ALBUTerol    90 MICROgram(s) HFA Inhaler 2 Puff(s) Inhalation every 6 hours    Hematalogic:  aspirin  chewable 81 milliGRAM(s) Oral daily  enoxaparin Injectable 40 milliGRAM(s) SubCutaneous two times a day    Other:  acetaminophen    Suspension .. 650 milliGRAM(s) Oral every 6 hours PRN  artificial  tears Solution 1 Drop(s) Both EYES every 4 hours PRN  ascorbic acid 1000 milliGRAM(s) Oral daily  chlorhexidine 2% Cloths 1 Application(s) Topical <User Schedule>  dexMEDEtomidine Infusion 1 MICROgram(s)/kG/Hr IV Continuous <Continuous>  fentaNYL   Infusion 4 MICROgram(s)/kG/Hr IV Continuous <Continuous>  insulin lispro (ADMELOG) corrective regimen sliding scale   SubCutaneous every 6 hours  LORazepam   Injectable 2 milliGRAM(s) IV Push every 4 hours  pantoprazole    Tablet 40 milliGRAM(s) Oral before breakfast  polyethylene glycol 3350 17 Gram(s) Oral daily  senna 2 Tablet(s) Oral at bedtime  sodium chloride 0.9% lock flush 10 milliLiter(s) IV Push every 1 hour PRN    acetaminophen    Suspension .. 650 milliGRAM(s) Oral every 6 hours PRN  ALBUTerol    90 MICROgram(s) HFA Inhaler 2 Puff(s) Inhalation every 6 hours  artificial  tears Solution 1 Drop(s) Both EYES every 4 hours PRN  ascorbic acid 1000 milliGRAM(s) Oral daily  aspirin  chewable 81 milliGRAM(s) Oral daily  chlorhexidine 2% Cloths 1 Application(s) Topical <User Schedule>  dexMEDEtomidine Infusion 1 MICROgram(s)/kG/Hr IV Continuous <Continuous>  enoxaparin Injectable 40 milliGRAM(s) SubCutaneous two times a day  fentaNYL   Infusion 4 MICROgram(s)/kG/Hr IV Continuous <Continuous>  hydrochlorothiazide 50 milliGRAM(s) Oral daily  insulin lispro (ADMELOG) corrective regimen sliding scale   SubCutaneous every 6 hours  LORazepam   Injectable 2 milliGRAM(s) IV Push every 4 hours  norepinephrine Infusion 0.05 MICROgram(s)/kG/Min IV Continuous <Continuous>  pantoprazole    Tablet 40 milliGRAM(s) Oral before breakfast  phenylephrine    Infusion 0.1 MICROgram(s)/kG/Min IV Continuous <Continuous>  piperacillin/tazobactam IVPB.. 3.375 Gram(s) IV Intermittent every 8 hours  polyethylene glycol 3350 17 Gram(s) Oral daily  senna 2 Tablet(s) Oral at bedtime  sodium chloride 0.9% lock flush 10 milliLiter(s) IV Push every 1 hour PRN    Drug Dosing Weight  Height (cm): 167.6 (14 Mar 2021 12:03)  Weight (kg): 83.869 (14 Mar 2021 12:03)  BMI (kg/m2): 29.9 (14 Mar 2021 12:03)  BSA (m2): 1.93 (14 Mar 2021 12:03)    CENTRAL LINE: [x ] YES [ ] NO  LOCATION: [facility]  DATE INSERTED: 4/6  REMOVE: [ ] YES [ ] NO  EXPLAIN:    RIVERA: [x ] YES [ ] NO    DATE INSERTED:  REMOVE:  [ x] YES [ ] NO  EXPLAIN:    A-LINE:  [x ] YES [ ] NO  LOCATION:   DATE INSERTED:  REMOVE:  [ ] YES [ ] NO  EXPLAIN:    PMH -reviewed admission note, no change since admission  PAST MEDICAL & SURGICAL HISTORY:  No pertinent past medical history    S/P moody  1990s    S/P appendectomy  1990s        ICU Vital Signs Last 24 Hrs  T(C): 37.7 (13 Apr 2021 07:00), Max: 38.9 (12 Apr 2021 20:00)  T(F): 99.9 (13 Apr 2021 07:00), Max: 102 (12 Apr 2021 20:00)  HR: 117 (13 Apr 2021 07:00) (73 - 150)  BP: 154/79 (12 Apr 2021 17:00) (74/38 - 154/79)  BP(mean): 96 (12 Apr 2021 17:00) (46 - 101)  ABP: 117/57 (13 Apr 2021 07:00) (85/52 - 149/68)  ABP(mean): 76 (13 Apr 2021 07:00) (62 - 94)  RR: 30 (13 Apr 2021 07:00) (16 - 32)  SpO2: 96% (13 Apr 2021 07:00) (90% - 98%)      ABG - ( 13 Apr 2021 04:33 )  pH, Arterial: 7.37  pH, Blood: x     /  pCO2: 65    /  pO2: 92    / HCO3: 38    / Base Excess: 9.7   /  SaO2: NR                    04-12 @ 07:01  -  04-13 @ 07:00  --------------------------------------------------------  IN: 2245.8 mL / OUT: 1635 mL / NET: 610.8 mL        Mode: AC/ CMV (Assist Control/ Continuous Mandatory Ventilation)  RR (machine): 30  FiO2: 70  PEEP: 8  ITime: 1  MAP: 15  PC: 20  PIP: 22      PHYSICAL EXAM:  GENERAL: sedated and intubated   HEAD:  Atraumatic, Normocephalic  EYES:  Dry eyes,   MOUTH : ETT  NECK: Supple,  No JVD; Normal thyroid; Trachea midline [facility]  NERVOUS SYSTEM:  sedated  CHEST/LUNG: B/L crackles,   HEART: Regular rate and rhythm; No murmurs, rubs, or gallops  ABDOMEN: Soft, Nontender, Nondistended; Bowel sounds present  EXTREMITIES:  b/l upper extremities edema +2,  no edema on lower legs. No clubbing, cyanosis   LYMPH: No lymphadenopathy noted  SKIN: No rashes  Good capillary refill      LABS:  CBC Full  -  ( 13 Apr 2021 04:25 )  WBC Count : 8.73 K/uL  RBC Count : 3.04 M/uL  Hemoglobin : 9.7 g/dL  Hematocrit : 30.9 %  Platelet Count - Automated : 372 K/uL  Mean Cell Volume : 101.6 fl  Mean Cell Hemoglobin : 31.9 pg  Mean Cell Hemoglobin Concentration : 31.4 gm/dL  Auto Neutrophil # : SEE NOTE K/uL  Auto Lymphocyte # : SEE NOTE K/uL  Auto Monocyte # : SEE NOTE K/uL  Auto Eosinophil # : SEE NOTE K/uL  Auto Basophil # : SEE NOTE K/uL  Auto Neutrophil % : SEE NOTE %  Auto Lymphocyte % : SEE NOTE %  Auto Monocyte % : SEE NOTE %  Auto Eosinophil % : SEE NOTE %  Auto Basophil % : SEE NOTE %    04-13    139  |  102  |  18  ----------------------------<  127<H>  3.7   |  33<H>  |  0.40<L>    Ca    8.2<L>      13 Apr 2021 04:25    TPro  5.8<L>  /  Alb  1.9<L>  /  TBili  0.7  /  DBili  x   /  AST  18  /  ALT  36  /  AlkPhos  83  04-13            RADIOLOGY & ADDITIONAL STUDIES REVIEWED:  ***    [ ]GOALS OF CARE DISCUSSION WITH PATIENT/FAMILY/PROXY:    CRITICAL CARE TIME SPENT: 35 minutes

## 2021-04-13 NOTE — PROGRESS NOTE ADULT - SUBJECTIVE AND OBJECTIVE BOX
INTERVAL HPI/OVERNIGHT EVENTS:  Patient seen and examined, ICU requesting trach and peg.  Pt requiring increase in vent setting, current vent setting peep 5, fio2 70%  Chest tube remains on suction  O2 sat 92%    MEDICATIONS  (STANDING):  ALBUTerol    90 MICROgram(s) HFA Inhaler 2 Puff(s) Inhalation every 6 hours  ascorbic acid 1000 milliGRAM(s) Oral daily  aspirin  chewable 81 milliGRAM(s) Oral daily  chlorhexidine 0.12% Liquid 15 milliLiter(s) Oral Mucosa two times a day  chlorhexidine 2% Cloths 1 Application(s) Topical <User Schedule>  dexMEDEtomidine Infusion 1 MICROgram(s)/kG/Hr (21 mL/Hr) IV Continuous <Continuous>  enoxaparin Injectable 40 milliGRAM(s) SubCutaneous two times a day  fentaNYL   Infusion 4 MICROgram(s)/kG/Hr (33.5 mL/Hr) IV Continuous <Continuous>  insulin lispro (ADMELOG) corrective regimen sliding scale   SubCutaneous every 6 hours  LORazepam   Injectable 2 milliGRAM(s) IV Push every 4 hours  meropenem  IVPB 1000 milliGRAM(s) IV Intermittent every 8 hours  meropenem  IVPB      norepinephrine Infusion 0.05 MICROgram(s)/kG/Min (3.93 mL/Hr) IV Continuous <Continuous>  pantoprazole    Tablet 40 milliGRAM(s) Oral before breakfast  piperacillin/tazobactam IVPB.. 3.375 Gram(s) IV Intermittent every 8 hours  polyethylene glycol 3350 17 Gram(s) Oral daily  senna 2 Tablet(s) Oral at bedtime    MEDICATIONS  (PRN):  acetaminophen    Suspension .. 650 milliGRAM(s) Oral every 6 hours PRN Temp greater or equal to 38C (100.4F)  artificial  tears Solution 1 Drop(s) Both EYES every 4 hours PRN Dry Eyes  ibuprofen  Suspension. 600 milliGRAM(s) Enteral Tube every 6 hours PRN Temp greater or equal to 38C (100.4F)  sodium chloride 0.9% lock flush 10 milliLiter(s) IV Push every 1 hour PRN Pre/post blood products, medications, blood draw, and to maintain line patency      Vital Signs Last 24 Hrs  T(C): 38.9 (13 Apr 2021 10:00), Max: 38.9 (12 Apr 2021 20:00)  T(F): 102 (13 Apr 2021 10:00), Max: 102 (12 Apr 2021 20:00)  HR: 147 (13 Apr 2021 13:00) (96 - 147)  BP: 154/79 (12 Apr 2021 17:00) (74/38 - 154/79)  BP(mean): 96 (12 Apr 2021 17:00) (46 - 101)  RR: 31 (13 Apr 2021 13:00) (19 - 32)  SpO2: 92% (13 Apr 2021 13:00) (90% - 98%)    Physical:  General: intubated, sedated  Chest: left pigtail catheter in place, connected to wall suction, no airleak appreciated. Symmetrical chest rise    I&O's Detail    12 Apr 2021 07:01  -  13 Apr 2021 07:00  --------------------------------------------------------  IN:    Dexmedetomidine: 483 mL    Enteral Tube Flush: 190 mL    FentaNYL: 755.3 mL    Glucerna 1.5: 250 mL    IV PiggyBack: 300 mL    IV PiggyBack: 100 mL    Norepinephrine: 167.5 mL  Total IN: 2245.8 mL    OUT:    Chest Tube (mL): 10 mL    Indwelling Catheter - Urethral (mL): 1625 mL    Phenylephrine: 0 mL  Total OUT: 1635 mL    Total NET: 610.8 mL      LABS:                        9.7    8.73  )-----------( 372      ( 13 Apr 2021 04:25 )             30.9             04-13    139  |  102  |  18  ----------------------------<  127<H>  3.7   |  33<H>  |  0.40<L>    Ca    8.2<L>      13 Apr 2021 04:25    TPro  5.8<L>  /  Alb  1.9<L>  /  TBili  0.7  /  DBili  x   /  AST  18  /  ALT  36  /  AlkPhos  83  04-13

## 2021-04-13 NOTE — CHART NOTE - NSCHARTNOTEFT_GEN_A_CORE
Spoke with patient's brother Niko via  ID [id number] and gave the daily update Spoke with patient's brother Niko via  ID [phone] and gave the daily update.   Thoracic surgery is planning to do tracheostomy on 4/14 and peg tube on 4/15, brother was informed about the plan and he endorsed understanding. He stated that if it is for patient's well being then he is fine with anything (Interpretor ID [id number])

## 2021-04-13 NOTE — PROGRESS NOTE ADULT - ASSESSMENT
55M with acute respiratory failure 2/2 to COVID pneumonia, s/p left ant pigtail for pneumothorax 4/8   Pt requiring increase in vent settings, ICU team requesting trach and possible peg tube placement    -continue pigtail to suction  -daily chest x-ray  -plan for trach 4/15 if patient is optimized   -remainder of care per ICU team  55M with acute respiratory failure 2/2 to COVID pneumonia, s/p left ant pigtail for pneumothorax 4/8   Pt requiring increase in vent settings, ICU team requesting trach and possible peg tube placement    -continue pigtail to suction  -daily chest x-ray  -plan for possible trach 4/15 if able to get Fio2 <50%  -remainder of care per ICU team

## 2021-04-13 NOTE — PROGRESS NOTE ADULT - ASSESSMENT
Assessment and Recommendation:   Problem/Recommendation - 1:  Problem: COVID-19. Recommendation: isolation precautions  cont mechanical ventilation  taper FIO2 as tolerated  Taper pressors meds as feasible  Wean as tolerated  pulmonary toilet  NGT nutrition  ICU management.   Monitor oxygen sat  Monitor LFT, LDH, CRP, D-Dimer, Ferritin and procalcitonin  Vit C, D and zinc supp  Montelukast 10 mgs po Qhs  IV steroids.  Daily CXR  Possible Trach/Peg late this week   DVT and GI PPX     Problem/Recommendation - 2:  ·  Problem: UTI (urinary tract infection).  Recommendation: F.U cultures   Off Antibiotics.     Problem/Recommendation - 3:  ·  Problem: Chest pain.  Recommendation: likely related to Covid-19 infection.     Problem/Recommendation - 4:  ·  Problem: Transaminitis.  Recommendation: monitor LFTs  GI F/U     Problem/Recommendation - 5:  ·  Problem: Pneumothorax.  Recommendation: s/p left sided chest tube  Thoracic sx follow up  Daily  CXR   Management per ICU

## 2021-04-13 NOTE — PROGRESS NOTE ADULT - ATTENDING COMMENTS
55 yr old  man , non smoker with  moody 1990s presented 3/14 with x9 days worsening cough, subjective fevers, and SOB, with x2-3 days dysuria and central, non-radiating, constant CP. Admitted to medicine unit  for acute hypoxic respiratory failure secondary to pna from covid-19 infection .     Assessment:  1. Acute hypoxic respiratory failure  2 Covid-19 infection   3. Transaminitis  4. Prediabetes  5. Bilateral pneumothorax  6. Septic shock     Plan   -Febrile this morning despite being on Zosyn  -Will obtain ID consultation  -Broaden antibiotic coverage   -pat intubated 3/29   -Mechanical ventilation with lung protective strategy, titrate down fio2 as tolerated  -adjust vent as per ABG  -Increased leukocytosis and fevers, concern for new bacterial pneumonia  -Now with worsneing shock again and increasing pressor requirement  -PTX  improved post chest tube placement  -Cont. precedex and fentanyl  -Thoracic surgery consult for PEG/Trach placement  -Change central line  -D/c andrea catheter  -Ativan for benzo dependence  -isolation : contact and air borne   -monitor biomarkers daily, trending down   -Tube feedings, bowel regimen  -dvt/gi prophy  -hemodynamic monitoring   -Prognosis is guarded

## 2021-04-13 NOTE — PROGRESS NOTE ADULT - ASSESSMENT
55M, no PMH, PSH appy and moody 1990s presented 3/14 with x9 days worsening cough, subjective fevers, and SOB, with x2-3 days dysuria and central, non-radiating, constant CP. Admitted to [facility] for acute hypoxic respiratory failure s/t covid pneumonia, transferred to ICU for requiring HFNC. now intubated and sedated since 3/29. Noted pneumothorax/pneumomediastinum, chest tube placed on 4/8 by thoracic surgery.     1. Acute hypoxic respiratory failure  2. ARDS 2/2 Covid pneumonia  3. Transaminitis  4. Prediabetes  5. Abnormal TSH    =================== Neuro============================  Alert and oriented x 3 at baseline   intubated and sedated 3/29 on Vent  continue Fentanyl, Precedex and off midazolam  Ativan IV prn to help with ventilator synchrony,   Off propofol.    ================= Cardiovascular==========================  Hypertension  map above 60 goal  s/p Labetalol 20mg IVP for high BP  s/p Lopressor 5mg IVP x 1 4/6  BP controlled now, requiring phenylephrine , Will transition with Levophed     TACHYCARDIA:  HR in 80s-90s   continue monitoring     ================- Pulm=================================  Acute hypoxic respiratory failure: secondary to covid pneumonia  ARDS  -was on  HFNr then got intubated 3/29  -PC Vent setting : 18/30/70/10  -D-dimer initially elevated on presentation, Slowly rising again   - Patient on increased FiO2 requirement, will monitor , Will have to defer trach plan for now   - Thoracic surgery following  # Bacterial Pneumonia  - New infiltrate on CXR ,likely developing VAP, Will continue Zosyn, s/p 1 dose of Vanco   - MRSA negative from 3/26  -pulm. dr. Connor Kamara was discontinued due to positive antibodies   - c/w supportive care   - trend COVID  markers  - Finished Dexamethasone   -on prednisone taper    Pneumothorax and pneumomediastinum:  -X-ray chest: tiny left apical pneumothorax  -Thoracic surgery consulted   -Chest tube placed 4/8 pigtail to wall suction.   -monitor output  -X-ray monitoring daily, PTX resolved    ==================ID===================================  Covid pneumonia  -leukocytosis resolved  -low grade fever  -procal elevated  -plan as above     ================= Nephro================================  -pitting edema to both lower arms  -Electrolyte imbalance :  Phos 1.7, K 3.4, replaced with K-phos, K riders x 3  -Albumin 1.3, give Albumin 25% q6h x 48hr  -keep Cerda for now.   -monitor I/Os   -Net negative , Urine output monitoring  -D/C lasix and HCTZ  - Finished Albumin x 2 days on 4/10  -monitor lytes, CMP    =================GI====================================  Transaminitis:   likely secondary to Covid   - and  on presentation   hepatitis panel -ve  -Improved  -continue to monitor LFT    diet:   NGT (3/29) with tube feed  bowel regimen   Last BM 4/10  Currently on hold as patient was having increased residual     ================ Heme==================================  Elevated d-dimer: likely secondary to Covid  -d-dimer 423 on presentation, now wnl  -continue prophylactic Lovenox 40 mg bid    =================Endocrine===============================  Prediabetes:    -A1c  5.8  -BS controlled  -continue HSS  -monitor FS while on steroids    Abnormal TSH:  -TSH level noted 0.26,   -repeat TSH 0.37 and Free T4 1.82    ================= Skin/Catheters============================  No rashes. Peripheral IV lines.   LIj placed 4/6  RRA 3/29    - =================Prophylaxis =============================  Lovenox for DVT proph  Protonix for  GI proph    ==================GOC==================================   FULL CODE    updated condition to family by resident   55M, no PMH, PSH appy and moody 1990s presented 3/14 with x9 days worsening cough, subjective fevers, and SOB, with x2-3 days dysuria and central, non-radiating, constant CP. Admitted to [facility] for acute hypoxic respiratory failure s/t covid pneumonia, transferred to ICU for requiring HFNC. now intubated and sedated since 3/29. Noted pneumothorax/pneumomediastinum, chest tube placed on 4/8 by thoracic surgery.     1. Acute hypoxic respiratory failure  2. ARDS 2/2 Covid pneumonia  3. Transaminitis  4. Prediabetes  5. Abnormal TSH    =================== Neuro============================  Alert and oriented x 3 at baseline   intubated and sedated 3/29 on Vent  continue Fentanyl and Precedex, off midazolam  Ativan IV prn to help with ventilator synchrony,   Off propofol.    ================= Cardiovascular==========================  Hypertension  map above 60 goal  s/p Labetalol 20mg IVP for high BP  s/p Lopressor 5mg IVP x 1 4/6  BP controlled now, requiring phenylephrine , Will transition with Levophed     TACHYCARDIA:  HR in 80s-90s   continue monitoring     ================- Pulm=================================  Acute hypoxic respiratory failure: secondary to covid pneumonia  ARDS  -was on  HFNr then got intubated 3/29  -PC Vent setting : 18/30/70/10  -D-dimer initially elevated on presentation, Slowly rising again   - Patient on increased FiO2 requirement, will monitor , Will consult thoracic surgery for Trach and possible PEG plan  - Thoracic surgery following  # Bacterial Pneumonia  - New infiltrate on CXR ,likely developing VAP, on Zosyn, s/p 1 dose of Vanco   - Will switch to Meropenem  - MRSA negative from 3/26  -pulm. dr. Ryan  - ID consult Dr Kika     -Remdesivir was discontinued due to positive antibodies   - c/w supportive care   - trend COVID  markers  - Finished Dexamethasone   -on prednisone taper, Finished    Pneumothorax and pneumomediastinum:  -X-ray chest: tiny left apical pneumothorax  -Thoracic surgery consulted   -Chest tube placed 4/8 pigtail to wall suction.   -monitor output  -X-ray monitoring daily, PTX resolved    ==================ID===================================  Covid pneumonia  -leukocytosis resolved  -Still spiking fevers,  - Will switch Zosyn to Meropenem,   - D/C andrea, Switch Central line  -procal elevated  -plan as above     ================= Nephro================================  -pitting edema to both lower arms  -Electrolyte imbalance :  Phos 1.7, K 3.4, replaced with K-phos, K riders x 3  -Albumin 1.3, give Albumin 25% q6h x 48hr  - D/C Andrea for now.   -monitor I/Os   -Net negative , Urine output monitoring  -D/C lasix and HCTZ to help with pressor requirement   - Finished Albumin x 2 days on 4/10  -monitor lytes, CMP    =================GI====================================  Transaminitis:   likely secondary to Covid   - and  on presentation   hepatitis panel -ve  -Improved  -continue to monitor LFT    diet:   NGT (3/29) with tube feed  bowel regimen   Last BM 4/10  Diet resumed     ================ Heme==================================  Elevated d-dimer: likely secondary to Covid  -d-dimer 423 on presentation, now wnl  -continue prophylactic Lovenox 40 mg bid    =================Endocrine===============================  Prediabetes:    -A1c  5.8  -BS controlled  -continue HSS  -monitor FS while on steroids    Abnormal TSH:  -TSH level noted 0.26,   -repeat TSH 0.37 and Free T4 1.82    ================= Skin/Catheters============================  No rashes. Peripheral IV lines.   LIj placed 4/6, Replace  RRA 3/29    - =================Prophylaxis =============================  Lovenox for DVT proph  Protonix for  GI proph    ==================GOC==================================   FULL CODE    updated condition to family by resident

## 2021-04-13 NOTE — ADVANCED PRACTICE NURSE CONSULT - ASSESSMENT
This is a 55yr old male patient admitted for Hypoxemia, presenting with dry, cracked skin with dried blood to the Bottom Lip

## 2021-04-13 NOTE — PROGRESS NOTE ADULT - SUBJECTIVE AND OBJECTIVE BOX
Patient is a 55y old  Male who presents with a chief complaint of SOB (12 Apr 2021 09:19)    pt seen in icu [ x ], reg med floor [   ], bed [ x ], chair at bedside [   ], a+o x3 [  ], sedated [x  ],  nad [x  ]    andrea [ x ], ngt feed [x  ], et tube [ x ], cent line [x  ],          Allergies    No Known Allergies        Vitals    T(F): 101.5 (04-13-21 @ 00:00), Max: 102 (04-12-21 @ 20:00)  HR: 119 (04-13-21 @ 06:00) (73 - 150)  BP: 154/79 (04-12-21 @ 17:00) (74/38 - 154/79)  RR: 23 (04-13-21 @ 06:00) (14 - 32)  SpO2: 96% (04-13-21 @ 06:00) (90% - 98%)  Wt(kg): --  CAPILLARY BLOOD GLUCOSE      POCT Blood Glucose.: 133 mg/dL (13 Apr 2021 06:09)      Labs                          9.7    8.73  )-----------( 372      ( 13 Apr 2021 04:25 )             30.9       04-13    139  |  102  |  18  ----------------------------<  127<H>  3.7   |  33<H>  |  0.40<L>    Ca    8.2<L>      13 Apr 2021 04:25    TPro  5.8<L>  /  Alb  1.9<L>  /  TBili  0.7  /  DBili  x   /  AST  18  /  ALT  36  /  AlkPhos  83  04-13            .Urine Clean Catch (Midstream)  03-15 @ 00:52   No growth  --  --          Radiology Results      Meds    MEDICATIONS  (STANDING):  ALBUTerol    90 MICROgram(s) HFA Inhaler 2 Puff(s) Inhalation every 6 hours  ascorbic acid 1000 milliGRAM(s) Oral daily  aspirin  chewable 81 milliGRAM(s) Oral daily  chlorhexidine 2% Cloths 1 Application(s) Topical <User Schedule>  dexMEDEtomidine Infusion 1 MICROgram(s)/kG/Hr (21 mL/Hr) IV Continuous <Continuous>  enoxaparin Injectable 40 milliGRAM(s) SubCutaneous two times a day  fentaNYL   Infusion 4 MICROgram(s)/kG/Hr (33.5 mL/Hr) IV Continuous <Continuous>  hydrochlorothiazide 50 milliGRAM(s) Oral daily  insulin lispro (ADMELOG) corrective regimen sliding scale   SubCutaneous every 6 hours  LORazepam   Injectable 2 milliGRAM(s) IV Push every 4 hours  norepinephrine Infusion 0.05 MICROgram(s)/kG/Min (3.93 mL/Hr) IV Continuous <Continuous>  pantoprazole    Tablet 40 milliGRAM(s) Oral before breakfast  phenylephrine    Infusion 0.1 MICROgram(s)/kG/Min (3.15 mL/Hr) IV Continuous <Continuous>  piperacillin/tazobactam IVPB.. 3.375 Gram(s) IV Intermittent every 8 hours  polyethylene glycol 3350 17 Gram(s) Oral daily  senna 2 Tablet(s) Oral at bedtime      MEDICATIONS  (PRN):  acetaminophen    Suspension .. 650 milliGRAM(s) Oral every 6 hours PRN Temp greater or equal to 38C (100.4F)  artificial  tears Solution 1 Drop(s) Both EYES every 4 hours PRN Dry Eyes  sodium chloride 0.9% lock flush 10 milliLiter(s) IV Push every 1 hour PRN Pre/post blood products, medications, blood draw, and to maintain line patency      Physical Exam    Neuro :  no focal deficits  Respiratory: CTA B/L  CV: RRR, S1S2, no murmurs,   Abdominal: Soft, NT, ND +BS,  Extremities: No edema, + peripheral pulses      ASSESSMENT    Hypoxemia 2nd to covid pna   transaminitis  prediabetes  h/o appendectomy  cholecystectomy        PLAN    contact and airborne isolation  d/c remdesevir given covid ab positive noted   completed dexamethasone   started pulse steroids for 3 days - 250mg solumedrol bid now tapered to 20mg daily  cont asa, vit c,    cont albuterol inhaler   pulm f/u  procalcitonin, D-dimer, crp, ldh, ferritin, lactate noted ,    tmx 100.2  cont tylenol prn,   cont robitussin prn   pt on fentanyl, phenylephrine, and precedex drip  s/p intubation 3/29/21  O2 sat (86% - 99%) mech vent  O2 via mech vent and taper fio2 as tolerated   serial abg's   vent mgmt as per icu  xray 3/19/21 with pneumomediastinum  rept cxr with New trace right apical pneumothorax. New mild left apical pneumothorax. Grossly stable small pneumomediastinum.  Soft tissue emphysema at the neck bases bilaterally. Grossly stable bilateral pulmonary infiltrates noted.   cxr 2/24 with No evidence of pneumothorax can be appreciated on the available image. This may be related to patient positioning. Evidence of pneumomediastinum and subcutaneous emphysema in the lower neck is again noted. There are patchy bibasilar infiltrates and elevated right hemidiaphragm noted.   cxr 3/29/21 with No significant change bilateral infiltrates. There is a small simple left apical pneumothorax. No significant pleural effusion. Bilateral subcutaneous emphysema similar to prior.   pt intubated 6AM 3/29/21,   XRs on 7AM and 5PM with no obvious increase of ptx  cxr 4/4/21 with Improving bilateral airspace disease noted    cxr 4/8/21 with Small left pneumothorax noted above.  thoracic surg f/u   s/p L chest tube placement  CXR 4/11/21 with : No interval change compared to one day prior. No pneumothorax noted above.   Daily CXR  Monitor O2 status   pt started on zosyn  lispro ss   prognosis poor  cont current meds

## 2021-04-13 NOTE — ADVANCED PRACTICE NURSE CONSULT - RECOMMEDATIONS
-Clean the Bottom Lip with normal saline and pat dry  -Apply Vaseline or moisturizer b.i.d. PRN  -Provide oral care per protocol  -Continue to elevate/float the patients heels using heel protectors and reposition the patient Q 2hrs using wedges or pillows

## 2021-04-14 LAB
ALBUMIN SERPL ELPH-MCNC: 1.9 G/DL — LOW (ref 3.5–5)
ALP SERPL-CCNC: 95 U/L — SIGNIFICANT CHANGE UP (ref 40–120)
ALT FLD-CCNC: 33 U/L DA — SIGNIFICANT CHANGE UP (ref 10–60)
ANION GAP SERPL CALC-SCNC: 0 MMOL/L — LOW (ref 5–17)
ANISOCYTOSIS BLD QL: SLIGHT — SIGNIFICANT CHANGE UP
APTT BLD: 34.2 SEC — SIGNIFICANT CHANGE UP (ref 27.5–35.5)
AST SERPL-CCNC: 27 U/L — SIGNIFICANT CHANGE UP (ref 10–40)
BASE EXCESS BLDA CALC-SCNC: 10.1 MMOL/L — HIGH (ref -2–3)
BASE EXCESS BLDA CALC-SCNC: 12.2 MMOL/L — HIGH (ref -2–3)
BASE EXCESS BLDA CALC-SCNC: 9.8 MMOL/L — HIGH (ref -2–3)
BASE EXCESS BLDA CALC-SCNC: 9.8 MMOL/L — HIGH (ref -2–3)
BASOPHILS # BLD AUTO: 0 K/UL — SIGNIFICANT CHANGE UP (ref 0–0.2)
BASOPHILS NFR BLD AUTO: 0 % — SIGNIFICANT CHANGE UP (ref 0–2)
BILIRUB SERPL-MCNC: 0.6 MG/DL — SIGNIFICANT CHANGE UP (ref 0.2–1.2)
BLD GP AB SCN SERPL QL: SIGNIFICANT CHANGE UP
BLOOD GAS COMMENTS ARTERIAL: SIGNIFICANT CHANGE UP
BUN SERPL-MCNC: 16 MG/DL — SIGNIFICANT CHANGE UP (ref 7–18)
BUN SERPL-MCNC: 18 MG/DL — SIGNIFICANT CHANGE UP (ref 7–18)
BUN SERPL-MCNC: 18 MG/DL — SIGNIFICANT CHANGE UP (ref 7–18)
CALCIUM SERPL-MCNC: 8.2 MG/DL — LOW (ref 8.4–10.5)
CALCIUM SERPL-MCNC: 8.2 MG/DL — LOW (ref 8.4–10.5)
CALCIUM SERPL-MCNC: 8.4 MG/DL — SIGNIFICANT CHANGE UP (ref 8.4–10.5)
CHLORIDE SERPL-SCNC: 102 MMOL/L — SIGNIFICANT CHANGE UP (ref 96–108)
CHLORIDE SERPL-SCNC: 102 MMOL/L — SIGNIFICANT CHANGE UP (ref 96–108)
CHLORIDE SERPL-SCNC: 105 MMOL/L — SIGNIFICANT CHANGE UP (ref 96–108)
CO2 SERPL-SCNC: 40 MMOL/L — HIGH (ref 22–31)
CO2 SERPL-SCNC: 40 MMOL/L — HIGH (ref 22–31)
CO2 SERPL-SCNC: 41 MMOL/L — HIGH (ref 22–31)
CREAT SERPL-MCNC: 0.34 MG/DL — LOW (ref 0.5–1.3)
CREAT SERPL-MCNC: 0.44 MG/DL — LOW (ref 0.5–1.3)
CREAT SERPL-MCNC: 0.58 MG/DL — SIGNIFICANT CHANGE UP (ref 0.5–1.3)
CRP SERPL-MCNC: 223 MG/L — HIGH
EOSINOPHIL # BLD AUTO: 0 K/UL — SIGNIFICANT CHANGE UP (ref 0–0.5)
EOSINOPHIL NFR BLD AUTO: 0 % — SIGNIFICANT CHANGE UP (ref 0–6)
FERRITIN SERPL-MCNC: 1258 NG/ML — HIGH (ref 30–400)
GLUCOSE BLDC GLUCOMTR-MCNC: 133 MG/DL — HIGH (ref 70–99)
GLUCOSE BLDC GLUCOMTR-MCNC: 147 MG/DL — HIGH (ref 70–99)
GLUCOSE BLDC GLUCOMTR-MCNC: 153 MG/DL — HIGH (ref 70–99)
GLUCOSE BLDC GLUCOMTR-MCNC: 157 MG/DL — HIGH (ref 70–99)
GLUCOSE SERPL-MCNC: 143 MG/DL — HIGH (ref 70–99)
GLUCOSE SERPL-MCNC: 146 MG/DL — HIGH (ref 70–99)
GLUCOSE SERPL-MCNC: 148 MG/DL — HIGH (ref 70–99)
HCO3 BLDA-SCNC: 39 MMOL/L — HIGH (ref 21–28)
HCO3 BLDA-SCNC: 40 MMOL/L — HIGH (ref 21–28)
HCO3 BLDA-SCNC: 42 MMOL/L — HIGH (ref 21–28)
HCO3 BLDA-SCNC: 44 MMOL/L — HIGH (ref 21–28)
HCT VFR BLD CALC: 31.8 % — LOW (ref 39–50)
HGB BLD-MCNC: 9.9 G/DL — LOW (ref 13–17)
HOROWITZ INDEX BLDA+IHG-RTO: 60 — SIGNIFICANT CHANGE UP
HOROWITZ INDEX BLDA+IHG-RTO: 70 — SIGNIFICANT CHANGE UP
HYPOSEGMENTATION: PRESENT — SIGNIFICANT CHANGE UP
INR BLD: 1.3 RATIO — HIGH (ref 0.88–1.16)
LYMPHOCYTES # BLD AUTO: 1.48 K/UL — SIGNIFICANT CHANGE UP (ref 1–3.3)
LYMPHOCYTES # BLD AUTO: 13 % — SIGNIFICANT CHANGE UP (ref 13–44)
MAGNESIUM SERPL-MCNC: 2 MG/DL — SIGNIFICANT CHANGE UP (ref 1.6–2.6)
MAGNESIUM SERPL-MCNC: 2.1 MG/DL — SIGNIFICANT CHANGE UP (ref 1.6–2.6)
MANUAL SMEAR VERIFICATION: SIGNIFICANT CHANGE UP
MCHC RBC-ENTMCNC: 31.1 GM/DL — LOW (ref 32–36)
MCHC RBC-ENTMCNC: 32.2 PG — SIGNIFICANT CHANGE UP (ref 27–34)
MCV RBC AUTO: 103.6 FL — HIGH (ref 80–100)
METAMYELOCYTES # FLD: 1 % — HIGH (ref 0–0)
MONOCYTES # BLD AUTO: 0.23 K/UL — SIGNIFICANT CHANGE UP (ref 0–0.9)
MONOCYTES NFR BLD AUTO: 2 % — SIGNIFICANT CHANGE UP (ref 2–14)
NEUTROPHILS # BLD AUTO: 9.19 K/UL — HIGH (ref 1.8–7.4)
NEUTROPHILS NFR BLD AUTO: 75 % — SIGNIFICANT CHANGE UP (ref 43–77)
NEUTS BAND # BLD: 6 % — SIGNIFICANT CHANGE UP (ref 0–8)
NRBC # BLD: 0 /100 — SIGNIFICANT CHANGE UP (ref 0–0)
PCO2 BLDA: 133 MMHG — CRITICAL HIGH (ref 35–48)
PCO2 BLDA: 70 MMHG — CRITICAL HIGH (ref 35–48)
PCO2 BLDA: 84 MMHG — CRITICAL HIGH (ref 35–48)
PCO2 BLDA: 88 MMHG — CRITICAL HIGH (ref 35–48)
PH BLDA: 7.13 — CRITICAL LOW (ref 7.35–7.45)
PH BLDA: 7.27 — LOW (ref 7.35–7.45)
PH BLDA: 7.31 — LOW (ref 7.35–7.45)
PH BLDA: 7.35 — SIGNIFICANT CHANGE UP (ref 7.35–7.45)
PHOSPHATE SERPL-MCNC: 1.3 MG/DL — LOW (ref 2.5–4.5)
PHOSPHATE SERPL-MCNC: 3 MG/DL — SIGNIFICANT CHANGE UP (ref 2.5–4.5)
PLAT MORPH BLD: NORMAL — SIGNIFICANT CHANGE UP
PLATELET # BLD AUTO: 491 K/UL — HIGH (ref 150–400)
PLATELET COUNT - ESTIMATE: ABNORMAL
PO2 BLDA: 69 MMHG — LOW (ref 83–108)
PO2 BLDA: 72 MMHG — LOW (ref 83–108)
PO2 BLDA: 75 MMHG — LOW (ref 83–108)
PO2 BLDA: 80 MMHG — LOW (ref 83–108)
POIKILOCYTOSIS BLD QL AUTO: SLIGHT — SIGNIFICANT CHANGE UP
POLYCHROMASIA BLD QL SMEAR: SLIGHT — SIGNIFICANT CHANGE UP
POTASSIUM SERPL-MCNC: 3.3 MMOL/L — LOW (ref 3.5–5.3)
POTASSIUM SERPL-MCNC: 3.3 MMOL/L — LOW (ref 3.5–5.3)
POTASSIUM SERPL-MCNC: 3.4 MMOL/L — LOW (ref 3.5–5.3)
POTASSIUM SERPL-SCNC: 3.3 MMOL/L — LOW (ref 3.5–5.3)
POTASSIUM SERPL-SCNC: 3.3 MMOL/L — LOW (ref 3.5–5.3)
POTASSIUM SERPL-SCNC: 3.4 MMOL/L — LOW (ref 3.5–5.3)
PROCALCITONIN SERPL-MCNC: 0.86 NG/ML — HIGH (ref 0.02–0.1)
PROMYELOCYTES # FLD: 2 % — HIGH (ref 0–0)
PROT SERPL-MCNC: 6 G/DL — SIGNIFICANT CHANGE UP (ref 6–8.3)
PROTHROM AB SERPL-ACNC: 15.3 SEC — HIGH (ref 10.6–13.6)
RBC # BLD: 3.07 M/UL — LOW (ref 4.2–5.8)
RBC # FLD: 13.7 % — SIGNIFICANT CHANGE UP (ref 10.3–14.5)
RBC BLD AUTO: ABNORMAL
SAO2 % BLDA: 95 % — SIGNIFICANT CHANGE UP
SAO2 % BLDA: 96 % — SIGNIFICANT CHANGE UP
SAO2 % BLDA: 98 % — SIGNIFICANT CHANGE UP
SAO2 % BLDA: 98 % — SIGNIFICANT CHANGE UP
SMUDGE CELLS # BLD: PRESENT — SIGNIFICANT CHANGE UP
SODIUM SERPL-SCNC: 142 MMOL/L — SIGNIFICANT CHANGE UP (ref 135–145)
SODIUM SERPL-SCNC: 143 MMOL/L — SIGNIFICANT CHANGE UP (ref 135–145)
SODIUM SERPL-SCNC: 145 MMOL/L — SIGNIFICANT CHANGE UP (ref 135–145)
TARGETS BLD QL SMEAR: SLIGHT — SIGNIFICANT CHANGE UP
TRIGL SERPL-MCNC: 199 MG/DL — HIGH
VARIANT LYMPHS # BLD: 1 % — SIGNIFICANT CHANGE UP (ref 0–6)
WBC # BLD: 11.35 K/UL — HIGH (ref 3.8–10.5)
WBC # FLD AUTO: 11.35 K/UL — HIGH (ref 3.8–10.5)

## 2021-04-14 PROCEDURE — 99233 SBSQ HOSP IP/OBS HIGH 50: CPT

## 2021-04-14 PROCEDURE — 71045 X-RAY EXAM CHEST 1 VIEW: CPT | Mod: 26

## 2021-04-14 PROCEDURE — 99232 SBSQ HOSP IP/OBS MODERATE 35: CPT | Mod: 57

## 2021-04-14 RX ORDER — POTASSIUM CHLORIDE 20 MEQ
40 PACKET (EA) ORAL ONCE
Refills: 0 | Status: DISCONTINUED | OUTPATIENT
Start: 2021-04-14 | End: 2021-04-14

## 2021-04-14 RX ORDER — POTASSIUM CHLORIDE 20 MEQ
40 PACKET (EA) ORAL ONCE
Refills: 0 | Status: COMPLETED | OUTPATIENT
Start: 2021-04-14 | End: 2021-04-14

## 2021-04-14 RX ORDER — POTASSIUM CHLORIDE 20 MEQ
20 PACKET (EA) ORAL ONCE
Refills: 0 | Status: COMPLETED | OUTPATIENT
Start: 2021-04-14 | End: 2021-04-14

## 2021-04-14 RX ORDER — MIDAZOLAM HYDROCHLORIDE 1 MG/ML
0.02 INJECTION, SOLUTION INTRAMUSCULAR; INTRAVENOUS
Qty: 100 | Refills: 0 | Status: DISCONTINUED | OUTPATIENT
Start: 2021-04-14 | End: 2021-04-21

## 2021-04-14 RX ORDER — ENOXAPARIN SODIUM 100 MG/ML
40 INJECTION SUBCUTANEOUS
Refills: 0 | Status: DISCONTINUED | OUTPATIENT
Start: 2021-04-14 | End: 2021-04-16

## 2021-04-14 RX ORDER — MIDODRINE HYDROCHLORIDE 2.5 MG/1
5 TABLET ORAL EVERY 8 HOURS
Refills: 0 | Status: DISCONTINUED | OUTPATIENT
Start: 2021-04-14 | End: 2021-04-19

## 2021-04-14 RX ORDER — POTASSIUM CHLORIDE 20 MEQ
10 PACKET (EA) ORAL
Refills: 0 | Status: COMPLETED | OUTPATIENT
Start: 2021-04-14 | End: 2021-04-14

## 2021-04-14 RX ORDER — FUROSEMIDE 40 MG
40 TABLET ORAL ONCE
Refills: 0 | Status: COMPLETED | OUTPATIENT
Start: 2021-04-14 | End: 2021-04-14

## 2021-04-14 RX ORDER — PHENYLEPHRINE HYDROCHLORIDE 10 MG/ML
0.1 INJECTION INTRAVENOUS
Qty: 40 | Refills: 0 | Status: DISCONTINUED | OUTPATIENT
Start: 2021-04-14 | End: 2021-04-15

## 2021-04-14 RX ORDER — LACTULOSE 10 G/15ML
15 SOLUTION ORAL
Refills: 0 | Status: DISCONTINUED | OUTPATIENT
Start: 2021-04-14 | End: 2021-04-14

## 2021-04-14 RX ORDER — PANTOPRAZOLE SODIUM 20 MG/1
40 TABLET, DELAYED RELEASE ORAL DAILY
Refills: 0 | Status: DISCONTINUED | OUTPATIENT
Start: 2021-04-14 | End: 2021-04-23

## 2021-04-14 RX ADMIN — ALBUTEROL 2 PUFF(S): 90 AEROSOL, METERED ORAL at 16:30

## 2021-04-14 RX ADMIN — FENTANYL CITRATE 33.5 MICROGRAM(S)/KG/HR: 50 INJECTION INTRAVENOUS at 22:06

## 2021-04-14 RX ADMIN — CHLORHEXIDINE GLUCONATE 1 APPLICATION(S): 213 SOLUTION TOPICAL at 05:16

## 2021-04-14 RX ADMIN — Medication 1000 MILLIGRAM(S): at 11:05

## 2021-04-14 RX ADMIN — FENTANYL CITRATE 33.5 MICROGRAM(S)/KG/HR: 50 INJECTION INTRAVENOUS at 16:26

## 2021-04-14 RX ADMIN — DEXMEDETOMIDINE HYDROCHLORIDE IN 0.9% SODIUM CHLORIDE 21 MICROGRAM(S)/KG/HR: 4 INJECTION INTRAVENOUS at 05:16

## 2021-04-14 RX ADMIN — CHLORHEXIDINE GLUCONATE 15 MILLILITER(S): 213 SOLUTION TOPICAL at 18:00

## 2021-04-14 RX ADMIN — Medication 600 MILLIGRAM(S): at 05:16

## 2021-04-14 RX ADMIN — LACTULOSE 15 GRAM(S): 10 SOLUTION ORAL at 07:57

## 2021-04-14 RX ADMIN — Medication 40 MILLIEQUIVALENT(S): at 18:00

## 2021-04-14 RX ADMIN — MEROPENEM 100 MILLIGRAM(S): 1 INJECTION INTRAVENOUS at 22:06

## 2021-04-14 RX ADMIN — Medication 600 MILLIGRAM(S): at 12:37

## 2021-04-14 RX ADMIN — MIDODRINE HYDROCHLORIDE 5 MILLIGRAM(S): 2.5 TABLET ORAL at 14:47

## 2021-04-14 RX ADMIN — PANTOPRAZOLE SODIUM 40 MILLIGRAM(S): 20 TABLET, DELAYED RELEASE ORAL at 11:04

## 2021-04-14 RX ADMIN — Medication 50 MILLIEQUIVALENT(S): at 18:02

## 2021-04-14 RX ADMIN — ENOXAPARIN SODIUM 40 MILLIGRAM(S): 100 INJECTION SUBCUTANEOUS at 18:00

## 2021-04-14 RX ADMIN — MEROPENEM 100 MILLIGRAM(S): 1 INJECTION INTRAVENOUS at 05:18

## 2021-04-14 RX ADMIN — PHENYLEPHRINE HYDROCHLORIDE 3.15 MICROGRAM(S)/KG/MIN: 10 INJECTION INTRAVENOUS at 08:42

## 2021-04-14 RX ADMIN — Medication 100 MILLIEQUIVALENT(S): at 09:54

## 2021-04-14 RX ADMIN — MIDAZOLAM HYDROCHLORIDE 1.68 MG/KG/HR: 1 INJECTION, SOLUTION INTRAMUSCULAR; INTRAVENOUS at 19:15

## 2021-04-14 RX ADMIN — Medication 100 MILLIEQUIVALENT(S): at 07:54

## 2021-04-14 RX ADMIN — MIDODRINE HYDROCHLORIDE 5 MILLIGRAM(S): 2.5 TABLET ORAL at 22:06

## 2021-04-14 RX ADMIN — ALBUTEROL 2 PUFF(S): 90 AEROSOL, METERED ORAL at 08:22

## 2021-04-14 RX ADMIN — POLYETHYLENE GLYCOL 3350 17 GRAM(S): 17 POWDER, FOR SOLUTION ORAL at 11:06

## 2021-04-14 RX ADMIN — Medication 81 MILLIGRAM(S): at 11:03

## 2021-04-14 RX ADMIN — FENTANYL CITRATE 33.5 MICROGRAM(S)/KG/HR: 50 INJECTION INTRAVENOUS at 08:42

## 2021-04-14 RX ADMIN — Medication 100 MILLIEQUIVALENT(S): at 08:43

## 2021-04-14 RX ADMIN — Medication 650 MILLIGRAM(S): at 03:00

## 2021-04-14 RX ADMIN — Medication 1 MILLIGRAM(S): at 05:17

## 2021-04-14 RX ADMIN — Medication 1: at 23:25

## 2021-04-14 RX ADMIN — CHLORHEXIDINE GLUCONATE 15 MILLILITER(S): 213 SOLUTION TOPICAL at 05:16

## 2021-04-14 RX ADMIN — Medication 650 MILLIGRAM(S): at 02:00

## 2021-04-14 RX ADMIN — Medication 1: at 11:08

## 2021-04-14 RX ADMIN — DEXMEDETOMIDINE HYDROCHLORIDE IN 0.9% SODIUM CHLORIDE 21 MICROGRAM(S)/KG/HR: 4 INJECTION INTRAVENOUS at 08:42

## 2021-04-14 RX ADMIN — Medication 600 MILLIGRAM(S): at 11:07

## 2021-04-14 RX ADMIN — DEXMEDETOMIDINE HYDROCHLORIDE IN 0.9% SODIUM CHLORIDE 21 MICROGRAM(S)/KG/HR: 4 INJECTION INTRAVENOUS at 02:00

## 2021-04-14 RX ADMIN — ENOXAPARIN SODIUM 40 MILLIGRAM(S): 100 INJECTION SUBCUTANEOUS at 05:16

## 2021-04-14 RX ADMIN — ALBUTEROL 2 PUFF(S): 90 AEROSOL, METERED ORAL at 04:05

## 2021-04-14 RX ADMIN — Medication 40 MILLIEQUIVALENT(S): at 08:11

## 2021-04-14 RX ADMIN — Medication 40 MILLIGRAM(S): at 14:46

## 2021-04-14 RX ADMIN — MEROPENEM 100 MILLIGRAM(S): 1 INJECTION INTRAVENOUS at 14:31

## 2021-04-14 RX ADMIN — Medication 600 MILLIGRAM(S): at 06:16

## 2021-04-14 RX ADMIN — MIDAZOLAM HYDROCHLORIDE 1.68 MG/KG/HR: 1 INJECTION, SOLUTION INTRAMUSCULAR; INTRAVENOUS at 09:54

## 2021-04-14 NOTE — PROGRESS NOTE ADULT - PROBLEM SELECTOR PLAN 1
2/2 shock due to covid-19 PNA; comorbid L pneumothorax. 4/13 CXR indicated  lungs show unchanged bilateral  multifocal and diffuse ill-defined airspace opacities. Patient remains sedated/intubated (ETT 3/29); on pressor + midodrine, IV abx. s/p  L chest tube 4/8. Patient unweanable - surrogate agreeable for trach/peg. High risk of mortality. Patient's brother/Amin is identified surrogate. Full code.

## 2021-04-14 NOTE — PROGRESS NOTE ADULT - PROBLEM SELECTOR PLAN 4
2/2 covid-19; comorbid L pneumothorax. 4/13 CXR indicated  lungs show unchanged bilateral  multifocal and diffuse ill-defined airspace opacities. Patient remains sedated/intubated (ETT 3/29); on pressor + midodrine, IV abx. s/p  L chest tube 4/8. Plan for trach/peg. Full code.

## 2021-04-14 NOTE — CONSULT NOTE ADULT - ASSESSMENT
Pneumonia  Fevers  Leukocytosis    Plan: Continue Meropenem 1g iv q8 Pneumonia  Fevers  Leukocytosis    Plan: Continue Meropenem 1g iv q8  prognosis is poor

## 2021-04-14 NOTE — PROGRESS NOTE ADULT - PROBLEM SELECTOR PLAN 6
2/2 acute illness. Patient sedated/intubated, on pressor, + L pneumothorax 2/2 covid-19 PNA. With L chest tube. Patient unweanable - plan for trach/peg. Dependent on ADLs. High risk of mortality.

## 2021-04-14 NOTE — CONSULT NOTE ADULT - SUBJECTIVE AND OBJECTIVE BOX
HPI:  55M, no PMH, PSH appy and moody 1990s presented 3/14 with x9 days worsening cough, subjective fevers, and SOB, with x2-3 days dysuria and central, non-radiating, constant CP. Additionally endorses HA, LH, and myaglias, and denies abdominal pain and n/v/d. Patient is a nonsmoker, lives alone, and denies sick contacts. In ED, T 99.1, HR 76, /65, RR 18, and SPO2 98%. Given discomfort of breathing, placed on 4L NC, with improvement. Labs notable for lymphopenia and neutrophilia despite WBC wnl, d-dimer 423, AST//114, lactate 3.3, and . Trop negative x1. +COVID. CXR notable for b/l infiltrates, L>R. Given x1 dose Tylenol and 1L IVF bolus in ED. Admitted to [facility] for management of COVID PNA.  (14 Mar 2021 16:07)    REVIEW OF SYSTEMS:  [  ] Not able to illicit  General:	  Chest:	  GI:	  :  Skin:	  Musculoskeletal:	  Neuro:    PAST MEDICAL & SURGICAL HISTORY:  No pertinent past medical history    S/P moody  1990s    S/P appendectomy  1990s      ALLERGIES: No Known Allergies    MEDS:  acetaminophen    Suspension .. 650 milliGRAM(s) Oral every 6 hours PRN  ALBUTerol    90 MICROgram(s) HFA Inhaler 2 Puff(s) Inhalation every 6 hours  artificial  tears Solution 1 Drop(s) Both EYES every 4 hours PRN  ascorbic acid 1000 milliGRAM(s) Oral daily  aspirin  chewable 81 milliGRAM(s) Oral daily  chlorhexidine 0.12% Liquid 15 milliLiter(s) Oral Mucosa two times a day  chlorhexidine 2% Cloths 1 Application(s) Topical <User Schedule>  fentaNYL   Infusion 4 MICROgram(s)/kG/Hr IV Continuous <Continuous>  furosemide   Injectable 40 milliGRAM(s) IV Push once  ibuprofen  Suspension. 600 milliGRAM(s) Enteral Tube every 6 hours PRN  insulin lispro (ADMELOG) corrective regimen sliding scale   SubCutaneous every 6 hours  lactulose Syrup 15 Gram(s) Oral two times a day  LORazepam     Tablet 1 milliGRAM(s) Oral every 4 hours PRN  meropenem  IVPB 1000 milliGRAM(s) IV Intermittent every 8 hours  meropenem  IVPB      midazolam Infusion 0.02 mG/kG/Hr IV Continuous <Continuous>  midodrine 5 milliGRAM(s) Oral every 8 hours  pantoprazole  Injectable 40 milliGRAM(s) IV Push daily  phenylephrine    Infusion 0.1 MICROgram(s)/kG/Min IV Continuous <Continuous>  polyethylene glycol 3350 17 Gram(s) Oral daily  senna 2 Tablet(s) Oral at bedtime  sodium chloride 0.9% lock flush 10 milliLiter(s) IV Push every 1 hour PRN    SOCIAL HISTORY:  Smoker:      FAMILY HISTORY:    VITALS:  Vital Signs Last 24 Hrs  T(C): 38.4 (14 Apr 2021 06:00), Max: 38.5 (14 Apr 2021 02:00)  T(F): 101.2 (14 Apr 2021 06:00), Max: 101.3 (14 Apr 2021 02:00)  HR: 117 (14 Apr 2021 12:30) (81 - 149)  BP: --  BP(mean): --  RR: 30 (14 Apr 2021 12:30) (11 - 50)  SpO2: 89% (14 Apr 2021 12:30) (88% - 99%)      PHYSICAL EXAM:  Constitutional:  HEENT:  Neck:  Respiratory:  Cardiovascular:  Gastrointestinal:  Extremities:  Skin:  Ortho:  Neuro:      LABS/DIAGNOSTIC TESTS:                        9.9    11.35 )-----------( 491      ( 14 Apr 2021 03:50 )             31.8     WBC Count: 11.35 K/uL (04-14 @ 03:50)  WBC Count: 8.73 K/uL (04-13 @ 04:25)  WBC Count: 11.09 K/uL (04-12 @ 04:26)    04-14    142  |  102  |  18  ----------------------------<  143<H>  3.3<L>   |  40<H>  |  0.44<L>    Ca    8.4      14 Apr 2021 03:50  Phos  3.0     04-14  Mg     2.1     04-14    TPro  6.0  /  Alb  1.9<L>  /  TBili  0.6  /  DBili  x   /  AST  27  /  ALT  33  /  AlkPhos  95  04-14      LIVER FUNCTIONS - ( 14 Apr 2021 03:50 )  Alb: 1.9 g/dL / Pro: 6.0 g/dL / ALK PHOS: 95 U/L / ALT: 33 U/L DA / AST: 27 U/L / GGT: x           PT/INR - ( 14 Apr 2021 03:50 )   PT: 15.3 sec;   INR: 1.30 ratio         PTT - ( 14 Apr 2021 03:50 )  PTT:34.2 sec    ABG - ABG - ( 14 Apr 2021 12:40 )  pH, Arterial: 7.27  pH, Blood: x     /  pCO2: 88    /  pO2: 69    / HCO3: 40    / Base Excess: 9.8   /  SaO2: 96                  CULTURES:   .Urine Clean Catch (Midstream)  03-15 @ 00:52   No growth  --  --          RADIOLOGY:  < from: Xray Chest 1 View- PORTABLE-Routine (Xray Chest 1 View- PORTABLE-Routine in AM.) (04.13.21 @ 06:50) >  EXAM:  XR CHEST PORTABLE ROUTINE 1V                            PROCEDURE DATE:  04/13/2021          INTERPRETATION:  Portable chest radiograph    CLINICAL INFORMATION: Pneumothorax. Follow-up    TECHNIQUE:  Portable  AP view of the chest was obtained.    COMPARISON: 4/12/2021 chest available for review.    FINDINGS:    ET tube tip above tracheal bifurcation.  NG tube tip beyond GE junction.  LEFT IJ catheter tip in SVC.    LEFT multi-sidehole pigtail catheter overlies LEFT lower hemithorax.  The lungs show unchanged bilateral  multifocal and diffuse ill-defined airspace opacities..  No pneumothorax.    The heart and mediastinum are within normal limits.    Visualized osseous structures are intact.      IMPRESSION:   No interval change..              CARMEN MENA MD; Attending Radiologist  This document has been electronically signed. Apr 13 2021  3:35PM    < end of copied text >   HPI:  55M, no PMH, PSH appy and moody 1990s presented 3/14 with x9 days worsening cough, subjective fevers, and SOB, with x2-3 days dysuria and central, non-radiating, constant CP. Additionally endorses HA, LH, and myaglias, and denies abdominal pain and n/v/d. Patient is a nonsmoker, lives alone, and denies sick contacts. In ED, T 99.1, HR 76, /65, RR 18, and SPO2 98%. Given discomfort of breathing, placed on 4L NC, with improvement. Labs notable for lymphopenia and neutrophilia despite WBC wnl, d-dimer 423, AST//114, lactate 3.3, and . Trop negative x1. +COVID. CXR notable for b/l infiltrates, L>R. Given x1 dose Tylenol and 1L IVF bolus in ED. Admitted to [facility] for management of COVID PNA.  (14 Mar 2021 16:07)    History as above, patient admitted from home for fevers and shortness of breath.  Patient was intubated on 3/29 and was seen in the ICU still intubated and vent dependent with an FI02 of 70% and an oxygen saturation of 95%.  Patient is currently on one pressor with a blood pressure of 115/64.  Patient xray shows bilateral infiltrates and a left sided pneumothorax and a chest tube was placed on 4/8.  Patient is scheduled for a tracheostomy/ peg tube placement on friday if patient is stable.  He exhibited a fever of 101.2 this morning and has a slightly elevated WBC count.    REVIEW OF SYSTEMS:  [ x ] Not able to illicit      PAST MEDICAL & SURGICAL HISTORY:  No pertinent past medical history    S/P moody  1990s    S/P appendectomy  1990s      ALLERGIES: No Known Allergies    MEDS:  acetaminophen    Suspension .. 650 milliGRAM(s) Oral every 6 hours PRN  ALBUTerol    90 MICROgram(s) HFA Inhaler 2 Puff(s) Inhalation every 6 hours  artificial  tears Solution 1 Drop(s) Both EYES every 4 hours PRN  ascorbic acid 1000 milliGRAM(s) Oral daily  aspirin  chewable 81 milliGRAM(s) Oral daily  chlorhexidine 0.12% Liquid 15 milliLiter(s) Oral Mucosa two times a day  chlorhexidine 2% Cloths 1 Application(s) Topical <User Schedule>  fentaNYL   Infusion 4 MICROgram(s)/kG/Hr IV Continuous <Continuous>  furosemide   Injectable 40 milliGRAM(s) IV Push once  ibuprofen  Suspension. 600 milliGRAM(s) Enteral Tube every 6 hours PRN  insulin lispro (ADMELOG) corrective regimen sliding scale   SubCutaneous every 6 hours  lactulose Syrup 15 Gram(s) Oral two times a day  LORazepam     Tablet 1 milliGRAM(s) Oral every 4 hours PRN  meropenem  IVPB 1000 milliGRAM(s) IV Intermittent every 8 hours  meropenem  IVPB      midazolam Infusion 0.02 mG/kG/Hr IV Continuous <Continuous>  midodrine 5 milliGRAM(s) Oral every 8 hours  pantoprazole  Injectable 40 milliGRAM(s) IV Push daily  phenylephrine    Infusion 0.1 MICROgram(s)/kG/Min IV Continuous <Continuous>  polyethylene glycol 3350 17 Gram(s) Oral daily  senna 2 Tablet(s) Oral at bedtime  sodium chloride 0.9% lock flush 10 milliLiter(s) IV Push every 1 hour PRN    SOCIAL HISTORY:  Smoker:  unable to elicit  ETOH: unable to elicit    FAMILY HISTORY: unable to elicit    VITALS:  Vital Signs Last 24 Hrs  T(C): 38.4 (14 Apr 2021 06:00), Max: 38.5 (14 Apr 2021 02:00)  T(F): 101.2 (14 Apr 2021 06:00), Max: 101.3 (14 Apr 2021 02:00)  HR: 117 (14 Apr 2021 12:30) (81 - 149)  BP: --  BP(mean): --  RR: 30 (14 Apr 2021 12:30) (11 - 50)  SpO2: 89% (14 Apr 2021 12:30) (88% - 99%)      PHYSICAL EXAM:  Constitutional: ETT  HEENT: normocephalic with moist oral mucosa  Neck: supple no LN's no JVD  Respiratory: lungs clear no rales no rhonchi, L chest tube  Cardiovascular: S1 S2 reg no murmurs  Gastrointestinal: +BS with soft, nondistended abdomen; nontender, no guarding, no rigidity, no organomegaly  : Cerda catheter in place  Extremities: no edema no cyanosis  Skin: no rashes  Ortho: no jt swelling  Neuro: AAO x 0        LABS/DIAGNOSTIC TESTS:                        9.9    11.35 )-----------( 491      ( 14 Apr 2021 03:50 )             31.8     WBC Count: 11.35 K/uL (04-14 @ 03:50)  WBC Count: 8.73 K/uL (04-13 @ 04:25)  WBC Count: 11.09 K/uL (04-12 @ 04:26)    04-14    142  |  102  |  18  ----------------------------<  143<H>  3.3<L>   |  40<H>  |  0.44<L>    Ca    8.4      14 Apr 2021 03:50  Phos  3.0     04-14  Mg     2.1     04-14    TPro  6.0  /  Alb  1.9<L>  /  TBili  0.6  /  DBili  x   /  AST  27  /  ALT  33  /  AlkPhos  95  04-14      LIVER FUNCTIONS - ( 14 Apr 2021 03:50 )  Alb: 1.9 g/dL / Pro: 6.0 g/dL / ALK PHOS: 95 U/L / ALT: 33 U/L DA / AST: 27 U/L / GGT: x           PT/INR - ( 14 Apr 2021 03:50 )   PT: 15.3 sec;   INR: 1.30 ratio         PTT - ( 14 Apr 2021 03:50 )  PTT:34.2 sec    ABG - ABG - ( 14 Apr 2021 12:40 )  pH, Arterial: 7.27  pH, Blood: x     /  pCO2: 88    /  pO2: 69    / HCO3: 40    / Base Excess: 9.8   /  SaO2: 96                  CULTURES:   .Urine Clean Catch (Midstream)  03-15 @ 00:52   No growth  --  --          RADIOLOGY:  < from: Xray Chest 1 View- PORTABLE-Routine (Xray Chest 1 View- PORTABLE-Routine in AM.) (04.13.21 @ 06:50) >  EXAM:  XR CHEST PORTABLE ROUTINE 1V                            PROCEDURE DATE:  04/13/2021          INTERPRETATION:  Portable chest radiograph    CLINICAL INFORMATION: Pneumothorax. Follow-up    TECHNIQUE:  Portable  AP view of the chest was obtained.    COMPARISON: 4/12/2021 chest available for review.    FINDINGS:    ET tube tip above tracheal bifurcation.  NG tube tip beyond GE junction.  LEFT IJ catheter tip in SVC.    LEFT multi-sidehole pigtail catheter overlies LEFT lower hemithorax.  The lungs show unchanged bilateral  multifocal and diffuse ill-defined airspace opacities..  No pneumothorax.    The heart and mediastinum are within normal limits.    Visualized osseous structures are intact.      IMPRESSION:   No interval change..              CARMEN MENA MD; Attending Radiologist  This document has been electronically signed. Apr 13 2021  3:35PM    < end of copied text >   HPI:  55M, no PMH, PSH appy and moody 1990s presented 3/14 with x 9 days worsening cough, subjective fevers, and SOB, with x2-3 days dysuria and central, non-radiating, constant CP. Additionally endorses HA, LH, and myaglias, and denies abdominal pain and n/v/d. Patient is a nonsmoker, lives alone, and denies sick contacts. In ED, T 99.1, HR 76, /65, RR 18, and SPO2 98%. Given discomfort of breathing, placed on 4L NC, with improvement. Labs notable for lymphopenia and neutrophilia despite WBC wnl, d-dimer 423, AST//114, lactate 3.3, and . Trop negative x1. +COVID. CXR notable for b/l infiltrates, L>R. Given x1 dose Tylenol and 1L IVF bolus in ED. Admitted to [facility] for management of COVID PNA.  (14 Mar 2021 16:07)    History as above, patient admitted from home for fevers and shortness of breath.  Patient was intubated on 3/29 and was seen in the ICU still intubated and vent dependent with an FI02 of 70% and an oxygen saturation of 95%.  Patient is currently on one pressor with a blood pressure of 115/64.  Patient xray shows bilateral infiltrates and a left sided pneumothorax and a chest tube was placed on 4/8.  Patient is scheduled for a tracheostomy/ peg tube placement on friday if patient is stable.  He exhibited a fever of 101.2 this morning and has a slightly elevated WBC count.    REVIEW OF SYSTEMS:  [ x ] Not able to illicit      PAST MEDICAL & SURGICAL HISTORY:  No pertinent past medical history    S/P moody  1990s    S/P appendectomy  1990s      ALLERGIES: No Known Allergies    MEDS:  acetaminophen    Suspension .. 650 milliGRAM(s) Oral every 6 hours PRN  ALBUTerol    90 MICROgram(s) HFA Inhaler 2 Puff(s) Inhalation every 6 hours  artificial  tears Solution 1 Drop(s) Both EYES every 4 hours PRN  ascorbic acid 1000 milliGRAM(s) Oral daily  aspirin  chewable 81 milliGRAM(s) Oral daily  chlorhexidine 0.12% Liquid 15 milliLiter(s) Oral Mucosa two times a day  chlorhexidine 2% Cloths 1 Application(s) Topical <User Schedule>  fentaNYL   Infusion 4 MICROgram(s)/kG/Hr IV Continuous <Continuous>  furosemide   Injectable 40 milliGRAM(s) IV Push once  ibuprofen  Suspension. 600 milliGRAM(s) Enteral Tube every 6 hours PRN  insulin lispro (ADMELOG) corrective regimen sliding scale   SubCutaneous every 6 hours  lactulose Syrup 15 Gram(s) Oral two times a day  LORazepam     Tablet 1 milliGRAM(s) Oral every 4 hours PRN  meropenem  IVPB 1000 milliGRAM(s) IV Intermittent every 8 hours  meropenem  IVPB      midazolam Infusion 0.02 mG/kG/Hr IV Continuous <Continuous>  midodrine 5 milliGRAM(s) Oral every 8 hours  pantoprazole  Injectable 40 milliGRAM(s) IV Push daily  phenylephrine    Infusion 0.1 MICROgram(s)/kG/Min IV Continuous <Continuous>  polyethylene glycol 3350 17 Gram(s) Oral daily  senna 2 Tablet(s) Oral at bedtime  sodium chloride 0.9% lock flush 10 milliLiter(s) IV Push every 1 hour PRN    SOCIAL HISTORY:  Smoker:  unable to elicit  ETOH: unable to elicit    FAMILY HISTORY: unable to elicit    VITALS:  Vital Signs Last 24 Hrs  T(C): 38.4 (14 Apr 2021 06:00), Max: 38.5 (14 Apr 2021 02:00)  T(F): 101.2 (14 Apr 2021 06:00), Max: 101.3 (14 Apr 2021 02:00)  HR: 117 (14 Apr 2021 12:30) (81 - 149)  BP: --  BP(mean): --  RR: 30 (14 Apr 2021 12:30) (11 - 50)  SpO2: 89% (14 Apr 2021 12:30) (88% - 99%)      PHYSICAL EXAM:  Constitutional: ETT  HEENT: normocephalic with moist oral mucosa  Neck: supple no LN's no JVD  Respiratory: lungs clear no rales no rhonchi, L chest tube  Cardiovascular: S1 S2 reg no murmurs  Gastrointestinal: +BS with soft, nondistended abdomen; nontender, no guarding, no rigidity, no organomegaly  : Cerda catheter in place  Extremities: no edema no cyanosis  Skin: no rashes  Ortho: no jt swelling  Neuro: AAO x 0        LABS/DIAGNOSTIC TESTS:                        9.9    11.35 )-----------( 491      ( 14 Apr 2021 03:50 )             31.8     WBC Count: 11.35 K/uL (04-14 @ 03:50)  WBC Count: 8.73 K/uL (04-13 @ 04:25)  WBC Count: 11.09 K/uL (04-12 @ 04:26)    04-14    142  |  102  |  18  ----------------------------<  143<H>  3.3<L>   |  40<H>  |  0.44<L>    Ca    8.4      14 Apr 2021 03:50  Phos  3.0     04-14  Mg     2.1     04-14    TPro  6.0  /  Alb  1.9<L>  /  TBili  0.6  /  DBili  x   /  AST  27  /  ALT  33  /  AlkPhos  95  04-14      LIVER FUNCTIONS - ( 14 Apr 2021 03:50 )  Alb: 1.9 g/dL / Pro: 6.0 g/dL / ALK PHOS: 95 U/L / ALT: 33 U/L DA / AST: 27 U/L / GGT: x           PT/INR - ( 14 Apr 2021 03:50 )   PT: 15.3 sec;   INR: 1.30 ratio         PTT - ( 14 Apr 2021 03:50 )  PTT:34.2 sec    ABG - ABG - ( 14 Apr 2021 12:40 )  pH, Arterial: 7.27  pH, Blood: x     /  pCO2: 88    /  pO2: 69    / HCO3: 40    / Base Excess: 9.8   /  SaO2: 96                  CULTURES:   .Urine Clean Catch (Midstream)  03-15 @ 00:52   No growth  --  --          RADIOLOGY:  < from: Xray Chest 1 View- PORTABLE-Routine (Xray Chest 1 View- PORTABLE-Routine in AM.) (04.13.21 @ 06:50) >  EXAM:  XR CHEST PORTABLE ROUTINE 1V                            PROCEDURE DATE:  04/13/2021          INTERPRETATION:  Portable chest radiograph    CLINICAL INFORMATION: Pneumothorax. Follow-up    TECHNIQUE:  Portable  AP view of the chest was obtained.    COMPARISON: 4/12/2021 chest available for review.    FINDINGS:    ET tube tip above tracheal bifurcation.  NG tube tip beyond GE junction.  LEFT IJ catheter tip in SVC.    LEFT multi-sidehole pigtail catheter overlies LEFT lower hemithorax.  The lungs show unchanged bilateral  multifocal and diffuse ill-defined airspace opacities..  No pneumothorax.    The heart and mediastinum are within normal limits.    Visualized osseous structures are intact.      IMPRESSION:   No interval change..              CARMEN MENA MD; Attending Radiologist  This document has been electronically signed. Apr 13 2021  3:35PM    < end of copied text >

## 2021-04-14 NOTE — PROGRESS NOTE ADULT - SUBJECTIVE AND OBJECTIVE BOX
Patient is a 55y old  Male who presents with a chief complaint of SOB (14 Apr 2021 09:31)  patient remains intubated, sedated on mechanical ventilation. Unable to wean at this time. Scheduled for trach/Peg on friday. FIO2 60% with as sat 93%    INTERVAL HPI/OVERNIGHT EVENTS:      VITAL SIGNS:  T(F): 101.2 (04-14-21 @ 06:00)  HR: 100 (04-14-21 @ 09:30)  BP: --  RR: 32 (04-14-21 @ 09:30)  SpO2: 93% (04-14-21 @ 09:30)  Wt(kg): --  I&O's Detail    13 Apr 2021 07:01  -  14 Apr 2021 07:00  --------------------------------------------------------  IN:    Dexmedetomidine: 504 mL    Enteral Tube Flush: 210 mL    FentaNYL: 772.8 mL    Glucerna 1.5: 360 mL    IV PiggyBack: 200 mL    IV PiggyBack: 200 mL    Norepinephrine: 81 mL    Phenylephrine: 165.2 mL    Phenylephrine: 89 mL  Total IN: 2582 mL    OUT:    Chest Tube (mL): 25 mL    Incontinent per Condom Catheter (mL): 280 mL    Indwelling Catheter - Urethral (mL): 400 mL    Phenylephrine: 0 mL  Total OUT: 705 mL    Total NET: 1877 mL      14 Apr 2021 07:01  -  14 Apr 2021 11:46  --------------------------------------------------------  IN:    Dexmedetomidine: 21 mL    FentaNYL: 33.6 mL    Glucerna 1.5: 20 mL    Phenylephrine: 23.6 mL  Total IN: 98.2 mL    OUT:    Norepinephrine: 0 mL  Total OUT: 0 mL    Total NET: 98.2 mL        Mode: AC/ CMV (Assist Control/ Continuous Mandatory Ventilation)  RR (machine): 30  TV (machine): 380  FiO2: 60  PEEP: 8  ITime: 1  MAP: 17  PC: 20  PIP: 38        REVIEW OF SYSTEMS:    CONSTITUTIONAL:  No fevers, chills, sweats    HEENT:  Eyes:  No diplopia or blurred vision. ENT:  No earache, sore throat or runny nose.    CARDIOVASCULAR:  No pressure, squeezing, tightness, or heaviness about the chest; no palpitations.    RESPIRATORY:  Per HPI    GASTROINTESTINAL:  No abdominal pain, nausea, vomiting or diarrhea.    GENITOURINARY:  No dysuria, frequency or urgency.    NEUROLOGIC:  No paresthesias, fasciculations, seizures or weakness.    PSYCHIATRIC:  No disorder of thought or mood.      PHYSICAL EXAM:    Constitutional: Well developed and nourished  Eyes:Perrla  ENMT: normal  Neck:supple  Respiratory: good air entry  Cardiovascular: S1 S2 regular  Gastrointestinal: Soft, Non tender  Extremities: No edema  Vascular:normal  Neurological:Sedated  Musculoskeletal:Normal      MEDICATIONS  (STANDING):  ALBUTerol    90 MICROgram(s) HFA Inhaler 2 Puff(s) Inhalation every 6 hours  ascorbic acid 1000 milliGRAM(s) Oral daily  aspirin  chewable 81 milliGRAM(s) Oral daily  chlorhexidine 0.12% Liquid 15 milliLiter(s) Oral Mucosa two times a day  chlorhexidine 2% Cloths 1 Application(s) Topical <User Schedule>  enoxaparin Injectable 40 milliGRAM(s) SubCutaneous two times a day  fentaNYL   Infusion 4 MICROgram(s)/kG/Hr (33.5 mL/Hr) IV Continuous <Continuous>  insulin lispro (ADMELOG) corrective regimen sliding scale   SubCutaneous every 6 hours  lactulose Syrup 15 Gram(s) Oral two times a day  meropenem  IVPB 1000 milliGRAM(s) IV Intermittent every 8 hours  meropenem  IVPB      midazolam Infusion 0.02 mG/kG/Hr (1.68 mL/Hr) IV Continuous <Continuous>  midodrine 5 milliGRAM(s) Oral every 8 hours  pantoprazole  Injectable 40 milliGRAM(s) IV Push daily  phenylephrine    Infusion 0.1 MICROgram(s)/kG/Min (3.15 mL/Hr) IV Continuous <Continuous>  polyethylene glycol 3350 17 Gram(s) Oral daily  senna 2 Tablet(s) Oral at bedtime    MEDICATIONS  (PRN):  acetaminophen    Suspension .. 650 milliGRAM(s) Oral every 6 hours PRN Temp greater or equal to 38C (100.4F)  artificial  tears Solution 1 Drop(s) Both EYES every 4 hours PRN Dry Eyes  ibuprofen  Suspension. 600 milliGRAM(s) Enteral Tube every 6 hours PRN Temp greater or equal to 38C (100.4F)  LORazepam     Tablet 1 milliGRAM(s) Oral every 4 hours PRN Agitation  sodium chloride 0.9% lock flush 10 milliLiter(s) IV Push every 1 hour PRN Pre/post blood products, medications, blood draw, and to maintain line patency      Allergies    No Known Allergies    Intolerances        LABS:                        9.9    11.35 )-----------( 491      ( 14 Apr 2021 03:50 )             31.8     04-14    142  |  102  |  18  ----------------------------<  143<H>  3.3<L>   |  40<H>  |  0.44<L>    Ca    8.4      14 Apr 2021 03:50  Phos  3.0     04-14  Mg     2.1     04-14    TPro  6.0  /  Alb  1.9<L>  /  TBili  0.6  /  DBili  x   /  AST  27  /  ALT  33  /  AlkPhos  95  04-14    PT/INR - ( 14 Apr 2021 03:50 )   PT: 15.3 sec;   INR: 1.30 ratio         PTT - ( 14 Apr 2021 03:50 )  PTT:34.2 sec    ABG - ( 14 Apr 2021 05:56 )  pH, Arterial: 7.31  pH, Blood: x     /  pCO2: 84    /  pO2: 80    / HCO3: 42    / Base Excess: 12.2  /  SaO2: 98                    CAPILLARY BLOOD GLUCOSE      POCT Blood Glucose.: 153 mg/dL (14 Apr 2021 10:40)  POCT Blood Glucose.: 147 mg/dL (14 Apr 2021 05:26)  POCT Blood Glucose.: 136 mg/dL (13 Apr 2021 23:09)  POCT Blood Glucose.: 160 mg/dL (13 Apr 2021 16:46)    pro-bnp -- 04-12 @ 04:26     d-dimer 1313  04-12 @ 04:26  pro-bnp -- 04-09 @ 04:38     d-dimer 507  04-09 @ 04:38      RADIOLOGY & ADDITIONAL TESTS:    CXR:  < from: Xray Chest 1 View- PORTABLE-Routine (Xray Chest 1 View- PORTABLE-Routine in AM.) (04.13.21 @ 06:50) >  IMPRESSION:   No interval change..    < end of copied text >    Ct scan chest:    ekg;    echo:

## 2021-04-14 NOTE — PROGRESS NOTE ADULT - SUBJECTIVE AND OBJECTIVE BOX
Patient is a 55y old  Male who presents with a chief complaint of SOB (13 Apr 2021 13:46)    pt seen in icu [ x ], reg med floor [   ], bed [ x ], chair at bedside [   ], a+o x3 [  ], sedated [x  ],  nad [x  ]    andrea [ x ], ngt feed [x  ], et tube [ x ], cent line [x  ],        Allergies    No Known Allergies        Vitals    T(F): 101.2 (04-14-21 @ 06:00), Max: 102 (04-13-21 @ 10:00)  HR: 82 (04-14-21 @ 06:15) (82 - 149)  BP: --  RR: 30 (04-14-21 @ 06:15) (11 - 40)  SpO2: 95% (04-14-21 @ 06:15) (88% - 99%)  Wt(kg): --  CAPILLARY BLOOD GLUCOSE      POCT Blood Glucose.: 147 mg/dL (14 Apr 2021 05:26)      Labs                          9.9    11.35 )-----------( 491      ( 14 Apr 2021 03:50 )             31.8       04-14    142  |  102  |  18  ----------------------------<  143<H>  3.3<L>   |  40<H>  |  0.44<L>    Ca    8.4      14 Apr 2021 03:50  Phos  3.0     04-14  Mg     2.1     04-14    TPro  6.0  /  Alb  1.9<L>  /  TBili  0.6  /  DBili  x   /  AST  27  /  ALT  33  /  AlkPhos  95  04-14            .Urine Clean Catch (Midstream)  03-15 @ 00:52   No growth  --  --          Radiology Results      Meds    MEDICATIONS  (STANDING):  ALBUTerol    90 MICROgram(s) HFA Inhaler 2 Puff(s) Inhalation every 6 hours  ascorbic acid 1000 milliGRAM(s) Oral daily  aspirin  chewable 81 milliGRAM(s) Oral daily  chlorhexidine 0.12% Liquid 15 milliLiter(s) Oral Mucosa two times a day  chlorhexidine 2% Cloths 1 Application(s) Topical <User Schedule>  dexMEDEtomidine Infusion 1 MICROgram(s)/kG/Hr (21 mL/Hr) IV Continuous <Continuous>  enoxaparin Injectable 40 milliGRAM(s) SubCutaneous two times a day  fentaNYL   Infusion 4 MICROgram(s)/kG/Hr (33.5 mL/Hr) IV Continuous <Continuous>  insulin lispro (ADMELOG) corrective regimen sliding scale   SubCutaneous every 6 hours  LORazepam   Injectable 1 milliGRAM(s) IV Push every 4 hours  meropenem  IVPB 1000 milliGRAM(s) IV Intermittent every 8 hours  meropenem  IVPB      norepinephrine Infusion 0.05 MICROgram(s)/kG/Min (3.93 mL/Hr) IV Continuous <Continuous>  pantoprazole  Injectable 40 milliGRAM(s) IV Push daily  phenylephrine    Infusion 0.1 MICROgram(s)/kG/Min (3.15 mL/Hr) IV Continuous <Continuous>  polyethylene glycol 3350 17 Gram(s) Oral daily  senna 2 Tablet(s) Oral at bedtime      MEDICATIONS  (PRN):  acetaminophen    Suspension .. 650 milliGRAM(s) Oral every 6 hours PRN Temp greater or equal to 38C (100.4F)  artificial  tears Solution 1 Drop(s) Both EYES every 4 hours PRN Dry Eyes  ibuprofen  Suspension. 600 milliGRAM(s) Enteral Tube every 6 hours PRN Temp greater or equal to 38C (100.4F)  sodium chloride 0.9% lock flush 10 milliLiter(s) IV Push every 1 hour PRN Pre/post blood products, medications, blood draw, and to maintain line patency      Physical Exam    Neuro :  no focal deficits  Respiratory: CTA B/L  CV: RRR, S1S2, no murmurs,   Abdominal: Soft, NT, ND +BS,  Extremities: No edema, + peripheral pulses      ASSESSMENT    Hypoxemia 2nd to covid pna   transaminitis  prediabetes  h/o appendectomy  cholecystectomy        PLAN    contact and airborne isolation  d/c remdesevir given covid ab positive noted   completed dexamethasone   started pulse steroids for 3 days - 250mg solumedrol bid now tapered to 20mg daily  cont asa, vit c,    cont albuterol inhaler   pulm f/u  procalcitonin, D-dimer, crp, ldh, ferritin, lactate noted ,    tmx 100.2  cont tylenol prn,   cont robitussin prn   pt on fentanyl, phenylephrine, and precedex drip  s/p intubation 3/29/21  O2 sat (86% - 99%) mech vent  O2 via mech vent and taper fio2 as tolerated   serial abg's   vent mgmt as per icu  xray 3/19/21 with pneumomediastinum  rept cxr with New trace right apical pneumothorax. New mild left apical pneumothorax. Grossly stable small pneumomediastinum.  Soft tissue emphysema at the neck bases bilaterally. Grossly stable bilateral pulmonary infiltrates noted.   cxr 2/24 with No evidence of pneumothorax can be appreciated on the available image. This may be related to patient positioning. Evidence of pneumomediastinum and subcutaneous emphysema in the lower neck is again noted. There are patchy bibasilar infiltrates and elevated right hemidiaphragm noted.   cxr 3/29/21 with No significant change bilateral infiltrates. There is a small simple left apical pneumothorax. No significant pleural effusion. Bilateral subcutaneous emphysema similar to prior.   pt intubated 6AM 3/29/21,   XRs on 7AM and 5PM with no obvious increase of ptx  cxr 4/4/21 with Improving bilateral airspace disease noted    cxr 4/8/21 with Small left pneumothorax noted above.  thoracic surg f/u   s/p L chest tube placement  CXR 4/11/21 with : No interval change compared to one day prior. No pneumothorax noted above.   Daily CXR  Monitor O2 status   pt started on zosyn  lispro ss   prognosis poor  cont current meds         Patient is a 55y old  Male who presents with a chief complaint of SOB (13 Apr 2021 13:46)    pt seen in icu [ x ], reg med floor [   ], bed [ x ], chair at bedside [   ], a+o x3 [  ], sedated [x  ],  nad [x  ]    andrea [ x ], ngt feed [x  ], et tube [ x ], cent line [x  ],        Allergies    No Known Allergies        Vitals    T(F): 101.2 (04-14-21 @ 06:00), Max: 102 (04-13-21 @ 10:00)  HR: 82 (04-14-21 @ 06:15) (82 - 149)  BP: --  RR: 30 (04-14-21 @ 06:15) (11 - 40)  SpO2: 95% (04-14-21 @ 06:15) (88% - 99%)  Wt(kg): --  CAPILLARY BLOOD GLUCOSE      POCT Blood Glucose.: 147 mg/dL (14 Apr 2021 05:26)      Labs                          9.9    11.35 )-----------( 491      ( 14 Apr 2021 03:50 )             31.8       04-14    142  |  102  |  18  ----------------------------<  143<H>  3.3<L>   |  40<H>  |  0.44<L>    Ca    8.4      14 Apr 2021 03:50  Phos  3.0     04-14  Mg     2.1     04-14    TPro  6.0  /  Alb  1.9<L>  /  TBili  0.6  /  DBili  x   /  AST  27  /  ALT  33  /  AlkPhos  95  04-14            .Urine Clean Catch (Midstream)  03-15 @ 00:52   No growth  --  --          Radiology Results      Meds    MEDICATIONS  (STANDING):  ALBUTerol    90 MICROgram(s) HFA Inhaler 2 Puff(s) Inhalation every 6 hours  ascorbic acid 1000 milliGRAM(s) Oral daily  aspirin  chewable 81 milliGRAM(s) Oral daily  chlorhexidine 0.12% Liquid 15 milliLiter(s) Oral Mucosa two times a day  chlorhexidine 2% Cloths 1 Application(s) Topical <User Schedule>  dexMEDEtomidine Infusion 1 MICROgram(s)/kG/Hr (21 mL/Hr) IV Continuous <Continuous>  enoxaparin Injectable 40 milliGRAM(s) SubCutaneous two times a day  fentaNYL   Infusion 4 MICROgram(s)/kG/Hr (33.5 mL/Hr) IV Continuous <Continuous>  insulin lispro (ADMELOG) corrective regimen sliding scale   SubCutaneous every 6 hours  LORazepam   Injectable 1 milliGRAM(s) IV Push every 4 hours  meropenem  IVPB 1000 milliGRAM(s) IV Intermittent every 8 hours  meropenem  IVPB      norepinephrine Infusion 0.05 MICROgram(s)/kG/Min (3.93 mL/Hr) IV Continuous <Continuous>  pantoprazole  Injectable 40 milliGRAM(s) IV Push daily  phenylephrine    Infusion 0.1 MICROgram(s)/kG/Min (3.15 mL/Hr) IV Continuous <Continuous>  polyethylene glycol 3350 17 Gram(s) Oral daily  senna 2 Tablet(s) Oral at bedtime      MEDICATIONS  (PRN):  acetaminophen    Suspension .. 650 milliGRAM(s) Oral every 6 hours PRN Temp greater or equal to 38C (100.4F)  artificial  tears Solution 1 Drop(s) Both EYES every 4 hours PRN Dry Eyes  ibuprofen  Suspension. 600 milliGRAM(s) Enteral Tube every 6 hours PRN Temp greater or equal to 38C (100.4F)  sodium chloride 0.9% lock flush 10 milliLiter(s) IV Push every 1 hour PRN Pre/post blood products, medications, blood draw, and to maintain line patency      Physical Exam    Neuro :  no focal deficits  Respiratory: CTA B/L  CV: RRR, S1S2, no murmurs,   Abdominal: Soft, NT, ND +BS,  Extremities: No edema, + peripheral pulses      ASSESSMENT    Hypoxemia 2nd to covid pna   transaminitis  prediabetes  h/o appendectomy  cholecystectomy        PLAN    contact and airborne isolation  d/c remdesevir given covid ab positive noted   completed dexamethasone   started pulse steroids for 3 days - 250mg solumedrol bid now tapered off 4/14/21  cont asa, vit c,    cont albuterol inhaler   pulm f/u  procalcitonin, D-dimer, crp, ldh, ferritin, lactate noted ,    tmx 101.2   cont tylenol prn,   cont robitussin prn   pt on fentanyl, phenylephrine, norepi and precedex drip  s/p intubation 3/29/21  O2 sat (88% - 99%) mech vent  O2 via mech vent and taper fio2 as tolerated   serial abg's   vent mgmt as per icu  xray 3/19/21 with pneumomediastinum  rept cxr with New trace right apical pneumothorax. New mild left apical pneumothorax. Grossly stable small pneumomediastinum.  Soft tissue emphysema at the neck bases bilaterally. Grossly stable bilateral pulmonary infiltrates noted.   cxr 2/24 with No evidence of pneumothorax can be appreciated on the available image. This may be related to patient positioning. Evidence of pneumomediastinum and subcutaneous emphysema in the lower neck is again noted. There are patchy bibasilar infiltrates and elevated right hemidiaphragm noted.   cxr 3/29/21 with No significant change bilateral infiltrates. There is a small simple left apical pneumothorax. No significant pleural effusion. Bilateral subcutaneous emphysema similar to prior.   pt intubated 6AM 3/29/21,   XRs on 7AM and 5PM with no obvious increase of ptx  cxr 4/4/21 with Improving bilateral airspace disease noted    cxr 4/8/21 with Small left pneumothorax noted.  thoracic surg f/u   s/p L chest tube placement  CXR 4/11/21 with : No interval change compared to one day prior. No pneumothorax noted above.   Daily CXR  Monitor O2 status   pt for possible trach on 4/15/21 if fio2 <50%  id cons  abx changed to meropenem  f/u blood cx  d/c andrea  lispro ss   prognosis poor  cont current meds

## 2021-04-14 NOTE — PROGRESS NOTE ADULT - PROBLEM SELECTOR PLAN 3
2/2 covid-19 PNA; comorbid L pneumothorax. 4/13 CXR indicated  lungs show unchanged bilateral multifocal and diffuse ill-defined airspace opacities. Patient remains sedated/intubated (ETT 3/29); on pressor + midodrine, IV abx. s/p  L chest tube 4/8. Plan for trach/peg. Full code.

## 2021-04-14 NOTE — PROGRESS NOTE ADULT - ATTENDING COMMENTS
55 yr old  man , non smoker with  moody 1990s presented 3/14 with x9 days worsening cough, subjective fevers, and SOB, with x2-3 days dysuria and central, non-radiating, constant CP. Admitted to medicine unit  for acute hypoxic respiratory failure secondary to pna from covid-19 infection .     Assessment:  1. Acute hypoxic respiratory failure  2 Covid-19 infection   3. Transaminitis  4. Prediabetes  5. Bilateral pneumothorax  6. Septic shock     Plan   -Cont. antibiotics ID consultation appreciated  -pat intubated 3/29   -Mechanical ventilation with lung protective strategy, titrate down fio2 as tolerated  -adjust vent as per ABG  -Increased leukocytosis and fevers, concern for new bacterial pneumonia  -Now with worsening shock again and increasing pressor requirement  -PTX  improved post chest tube placement  -Cont. precedex and fentanyl  -Thoracic surgery consult for PEG/Trach placement, once hemodynamically stable  -D/c arterial line  -Restart versed as patient asynchronous with the vent  -isolation : contact and air borne   -monitor biomarkers daily, trending down   -Tube feedings, bowel regimen  -dvt/gi prophy  -hemodynamic monitoring   -Prognosis is guarded

## 2021-04-14 NOTE — PROGRESS NOTE ADULT - ASSESSMENT
55M, no PMH, PSH appy and moody 1990s presented 3/14 with x9 days worsening cough, subjective fevers, and SOB, with x2-3 days dysuria and central, non-radiating, constant CP. Admitted to [facility] for acute hypoxic respiratory failure s/t covid pneumonia, transferred to ICU for requiring HFNC. now intubated and sedated since 3/29. Noted pneumothorax/pneumomediastinum, chest tube placed on 4/8 by thoracic surgery.     1. Acute hypoxic respiratory failure  2. ARDS 2/2 Covid pneumonia  3. Transaminitis  4. Prediabetes  5. Abnormal TSH    =================== Neuro============================  Alert and oriented x 3 at baseline   intubated and sedated 3/29 on Vent  continue Fentanyl and Precedex, off midazolam  Ativan IV prn to help with ventilator synchrony,   Off propofol.    ================= Cardiovascular==========================  Hypertension  map above 60 goal  s/p Labetalol 20mg IVP for high BP  s/p Lopressor 5mg IVP x 1 4/6  BP controlled now, requiring phenylephrine , Will transition with Levophed     TACHYCARDIA:  HR in 80s-90s   continue monitoring     ================- Pulm=================================  Acute hypoxic respiratory failure: secondary to covid pneumonia  ARDS  -was on  HFNr then got intubated 3/29  -PC Vent setting : 18/30/70/10  -D-dimer initially elevated on presentation, Slowly rising again   - Patient on increased FiO2 requirement, will monitor , Will consult thoracic surgery for Trach and possible PEG plan  - Thoracic surgery following  # Bacterial Pneumonia  - New infiltrate on CXR ,likely developing VAP, on Zosyn, s/p 1 dose of Vanco   - Will switch to Meropenem  - MRSA negative from 3/26  -pulm. dr. Ryan  - ID consult Dr Kika     -Remdesivir was discontinued due to positive antibodies   - c/w supportive care   - trend COVID  markers  - Finished Dexamethasone   -on prednisone taper, Finished    Pneumothorax and pneumomediastinum:  -X-ray chest: tiny left apical pneumothorax  -Thoracic surgery consulted   -Chest tube placed 4/8 pigtail to wall suction.   -monitor output  -X-ray monitoring daily, PTX resolved    ==================ID===================================  Covid pneumonia  -leukocytosis resolved  -Still spiking fevers,  - Will switch Zosyn to Meropenem,   - D/C andrea, Switch Central line  -procal elevated  -plan as above     ================= Nephro================================  -pitting edema to both lower arms  -Electrolyte imbalance :  Phos 1.7, K 3.4, replaced with K-phos, K riders x 3  -Albumin 1.3, give Albumin 25% q6h x 48hr  - D/C Andrea for now.   -monitor I/Os   -Net negative , Urine output monitoring  -D/C lasix and HCTZ to help with pressor requirement   - Finished Albumin x 2 days on 4/10  -monitor lytes, CMP    =================GI====================================  Transaminitis:   likely secondary to Covid   - and  on presentation   hepatitis panel -ve  -Improved  -continue to monitor LFT    diet:   NGT (3/29) with tube feed  bowel regimen   Last BM 4/10  Diet resumed     ================ Heme==================================  Elevated d-dimer: likely secondary to Covid  -d-dimer 423 on presentation, now wnl  -continue prophylactic Lovenox 40 mg bid    =================Endocrine===============================  Prediabetes:    -A1c  5.8  -BS controlled  -continue HSS  -monitor FS while on steroids    Abnormal TSH:  -TSH level noted 0.26,   -repeat TSH 0.37 and Free T4 1.82    ================= Skin/Catheters============================  No rashes. Peripheral IV lines.   LIj placed 4/6, Replace  RRA 3/29    - =================Prophylaxis =============================  Lovenox for DVT proph  Protonix for  GI proph    ==================GOC==================================   FULL CODE    updated condition to family by resident   55M, no PMH, PSH appy and moody 1990s presented 3/14 with x9 days worsening cough, subjective fevers, and SOB, with x2-3 days dysuria and central, non-radiating, constant CP. Admitted to [facility] for acute hypoxic respiratory failure s/t covid pneumonia, transferred to ICU for requiring HFNC. now intubated and sedated since 3/29. Noted pneumothorax/pneumomediastinum, chest tube placed on  by thoracic surgery.     1. Acute hypoxic respiratory failure  2. ARDS 2/2 Covid pneumonia  3. Transaminitis  4. Prediabetes  5. Abnormal TSH    =================== Neuro============================  Alert and oriented x 3 at baseline   intubated and sedated 3/29 on Vent  Will resume Versed to help with vent synchrony with Fentanyl, D/C Precedex for now as patient is not qualifying for SBT/SAT at this point  D/C Ativan IV for now   Off propofol.    ================= Cardiovascular==========================  Hypertension  map above 60 goal  s/p Labetalol 20mg IVP for high BP  s/p Lopressor 5mg IVP x 1 4/6  Septic Shock requiring Pressor support. Will try to wean off with PO midodrine 5mg Q8      TACHYCARDIA:  HR in 80s-90s   continue monitoring     ================- Pulm=================================  Acute hypoxic respiratory failure: secondary to covid pneumonia  ARDS  -was on  HFNr then got intubated 3/29  -PC Vent setting : 30/380/60/8  -AB.31/84/80/42  -D-dimer initially elevated on presentation, Slowly rising again   - Patient on increased FiO2 requirement, will monitor ,   - Thoracic surgery will take for Trach and peg on 4/15 morning provided patient is not requiring amount of pressors or FIO2.    # Bacterial Pneumonia  - New infiltrate on CXR ,likely developing VAP, on Zosyn, s/p 1 dose of Vanco   - switched to Meropenem   - MRSA negative from 3/26  -pulm. dr. Ryan  - ID consult Dr Anand     -Remdesivir was discontinued due to positive antibodies   - c/w supportive care   - trend COVID  markers  - Finished Dexamethasone   -on prednisone taper, Finished    Pneumothorax and pneumomediastinum:  -X-ray chest: tiny left apical pneumothorax  -Thoracic surgery consulted   -Chest tube placed  pigtail to wall suction. Will remove   -X-ray monitoring daily, PTX resolved    ==================ID===================================  Likely bacterial pneumonia at present  -leukocytosis resolved  -Still spiking fevers,  - Continue Meropenem,   - C-procal elevated  -plan as above     ================= Nephro================================  -pitting edema to both lower arms  -Electrolyte imbalance :  Phos 1.7, K 3.4, replaced with K-phos, K riders x 3  -Albumin 1.3, give Albumin 25% q6h x 48hr  - D/C Cerda for now.   -monitor I/Os   -Net negative , Urine output monitoring  -D/C lasix and HCTZ to help with pressor requirement   - Finished Albumin x 2 days on 4/10  -monitor lytes, CMP    =================GI====================================  Transaminitis:   likely secondary to Covid   - and  on presentation   hepatitis panel -ve  -Improved  -continue to monitor LFT    diet:   NGT (3/29) with tube feed  bowel regimen   Last BM 4/10  Diet resumed     ================ Heme==================================  Elevated d-dimer: likely secondary to Covid  -d-dimer 423 on presentation, now wnl  -continue prophylactic Lovenox 40 mg bid, hold before procedure     =================Endocrine===============================  Prediabetes:    -A1c  5.8  -BS controlled  -continue HSS  -monitor FS while on steroids    Abnormal TSH:  -TSH level noted 0.26,   -repeat TSH 0.37 and Free T4 1.82    ================= Skin/Catheters============================  No rashes. Peripheral IV lines.   RIj   RRA 3/29, Remove    - =================Prophylaxis =============================  Lovenox for DVT proph  Protonix for  GI proph    ==================GOC==================================   FULL CODE    updated condition to family by resident

## 2021-04-14 NOTE — PROGRESS NOTE ADULT - SUBJECTIVE AND OBJECTIVE BOX
Plan for tracheostomy/flexible bronchoscopy/PEG     Patient with decreasing oxygen requirements down to FiO2 60% - and peep of 8.   Discussed with brother (Brennan Lau - [phone]) - consent obtained   discussed risks of surgery including not limited to bleeding, infection, injury to surrounding structures, leaking    he was agreeable to proceed.

## 2021-04-14 NOTE — PROGRESS NOTE ADULT - SUBJECTIVE AND OBJECTIVE BOX
OVERNIGHT EVENTS: patient unweanable; plan for trach/peg    Present Symptoms:   Review of Systems:   Unable to obtain - patient sedated/intubated     MEDICATIONS  (STANDING):  ALBUTerol    90 MICROgram(s) HFA Inhaler 2 Puff(s) Inhalation every 6 hours  ascorbic acid 1000 milliGRAM(s) Oral daily  aspirin  chewable 81 milliGRAM(s) Oral daily  chlorhexidine 0.12% Liquid 15 milliLiter(s) Oral Mucosa two times a day  chlorhexidine 2% Cloths 1 Application(s) Topical <User Schedule>  fentaNYL   Infusion 4 MICROgram(s)/kG/Hr (33.5 mL/Hr) IV Continuous <Continuous>  insulin lispro (ADMELOG) corrective regimen sliding scale   SubCutaneous every 6 hours  lactulose Syrup 15 Gram(s) Oral two times a day  meropenem  IVPB 1000 milliGRAM(s) IV Intermittent every 8 hours  meropenem  IVPB      midazolam Infusion 0.02 mG/kG/Hr (1.68 mL/Hr) IV Continuous <Continuous>  midodrine 5 milliGRAM(s) Oral every 8 hours  pantoprazole  Injectable 40 milliGRAM(s) IV Push daily  phenylephrine    Infusion 0.1 MICROgram(s)/kG/Min (3.15 mL/Hr) IV Continuous <Continuous>  polyethylene glycol 3350 17 Gram(s) Oral daily  senna 2 Tablet(s) Oral at bedtime    MEDICATIONS  (PRN):  acetaminophen    Suspension .. 650 milliGRAM(s) Oral every 6 hours PRN Temp greater or equal to 38C (100.4F)  artificial  tears Solution 1 Drop(s) Both EYES every 4 hours PRN Dry Eyes  ibuprofen  Suspension. 600 milliGRAM(s) Enteral Tube every 6 hours PRN Temp greater or equal to 38C (100.4F)  LORazepam     Tablet 1 milliGRAM(s) Oral every 4 hours PRN Agitation  sodium chloride 0.9% lock flush 10 milliLiter(s) IV Push every 1 hour PRN Pre/post blood products, medications, blood draw, and to maintain line patency      PHYSICAL EXAM:  Vital Signs Last 24 Hrs  T(C): 38.4 (14 Apr 2021 06:00), Max: 38.5 (14 Apr 2021 02:00)  T(F): 101.2 (14 Apr 2021 06:00), Max: 101.3 (14 Apr 2021 02:00)  HR: 117 (14 Apr 2021 12:30) (81 - 149)  BP: --  BP(mean): --  RR: 30 (14 Apr 2021 12:30) (11 - 50)  SpO2: 89% (14 Apr 2021 12:30) (88% - 99%)  General: Patient not medically stable for full physical exam 2/2 covid-19 status. Patient sedated/intubated, on pressor.    Karnofsky Performance Score/Palliative Performance Status Version2:     10%    HEENT:  ET tube   Lungs: tachypnea, on ventilator. Continues fentanyl, versed. L chest tube  CV: tachycardia, on pressor + midodrine  GI:   incontinent, NGT  :  external catheter  Musculoskeletal: patient sedated/intubated, dependent on ADLs  Skin: unable to assess   Neuro:  patient sedated/intubated, dependent on ADLs  Oral intake ability: unable/only mouth care   Diet: NPO; TF via NGT     LABS:                          9.9    11.35 )-----------( 491      ( 14 Apr 2021 03:50 )             31.8     04-14    142  |  102  |  18  ----------------------------<  143<H>  3.3<L>   |  40<H>  |  0.44<L>    Ca    8.4      14 Apr 2021 03:50  Phos  3.0     04-14  Mg     2.1     04-14    TPro  6.0  /  Alb  1.9<L>  /  TBili  0.6  /  DBili  x   /  AST  27  /  ALT  33  /  AlkPhos  95  04-14    RADIOLOGY & ADDITIONAL STUDIES:  < from: Xray Chest 1 View- PORTABLE-Routine (Xray Chest 1 View- PORTABLE-Routine in AM.) (04.13.21 @ 06:50) >  EXAM:  XR CHEST PORTABLE ROUTINE 1V                        PROCEDURE DATE:  04/13/2021    INTERPRETATION:  Portable chest radiograph  CLINICAL INFORMATION: Pneumothorax. Follow-up  TECHNIQUE:  Portable  AP view of the chest was obtained.  COMPARISON: 4/12/2021 chest available for review.  FINDINGS:  ET tube tip above tracheal bifurcation.  NG tube tip beyond GE junction.  LEFT IJ catheter tip in SVC.  LEFT multi-sidehole pigtail catheter overlies LEFT lower hemithorax.  The lungs show unchanged bilateral  multifocal and diffuse ill-defined airspace opacities..  No pneumothorax.  The heart and mediastinum are within normal limits.  Visualized osseous structures are intact.    IMPRESSION:   No interval change..  < end of copied text >      ADVANCE DIRECTIVES: MOLST: CPR / long term ventilation if needed / long term feeding tube if needed / trial IV fluids / use abx

## 2021-04-14 NOTE — CHART NOTE - NSCHARTNOTEFT_GEN_A_CORE
Assessment:   Patient is a 55y old  Male who presents with a chief complaint of SOB (2021 13:45). Covid/ Isolation. Intubated. off Propofol (not on flow record)> TF 25 ml/hr, at times. Plan for trach/ PEG noted.    [ ] other:     Diet Prescription: Diet, NPO with Tube Feed:   Tube Feeding Modality: Nasogastric  Glucerna 1.5 Leonardo  Total Volume for 24 Hours (mL): 600  Continuous  Starting Tube Feed Rate {mL per Hour}: 10  Increase Tube Feed Rate by (mL): 10     Every hour  Until Goal Tube Feed Rate (mL per Hour): 25  Tube Feed Duration (in Hours): 24  Tube Feed Start Time: 02:00  No Carb Prosource (1pkg = 15gms Protein)     Qty per Day:  2 (21 @ 16:49)        Daily     Daily Weight in k.4 (2021 07:00)  Weight in k.5 (2021 08:00)  Weight in k.7 (2021 07:00)  Weight in k (10 Apr 2021 07:00)  Weight in k.6 (2021 07:30)  Weight in k (2021 07:30)  Weight in k.9 (2021 07:30)  Weight in k.2 (2021 08:00)  Weight in k (2021 07:00)  Weight in k.9 (2021 08:00)  Weight in k.9 (2021 08:00)  Weight in k.4 (2021 08:00)    % Weight Change: 3-4 +generalized edema.    Pertinent Medications: MEDICATIONS  (STANDING):  ALBUTerol    90 MICROgram(s) HFA Inhaler 2 Puff(s) Inhalation every 6 hours  ascorbic acid 1000 milliGRAM(s) Oral daily  aspirin  chewable 81 milliGRAM(s) Oral daily  chlorhexidine 0.12% Liquid 15 milliLiter(s) Oral Mucosa two times a day  chlorhexidine 2% Cloths 1 Application(s) Topical <User Schedule>  fentaNYL   Infusion 4 MICROgram(s)/kG/Hr (33.5 mL/Hr) IV Continuous <Continuous>  insulin lispro (ADMELOG) corrective regimen sliding scale   SubCutaneous every 6 hours  meropenem  IVPB 1000 milliGRAM(s) IV Intermittent every 8 hours  meropenem  IVPB      midazolam Infusion 0.02 mG/kG/Hr (1.68 mL/Hr) IV Continuous <Continuous>  midodrine 5 milliGRAM(s) Oral every 8 hours  pantoprazole  Injectable 40 milliGRAM(s) IV Push daily  phenylephrine    Infusion 0.1 MICROgram(s)/kG/Min (3.15 mL/Hr) IV Continuous <Continuous>  polyethylene glycol 3350 17 Gram(s) Oral daily  senna 2 Tablet(s) Oral at bedtime    MEDICATIONS  (PRN):  acetaminophen    Suspension .. 650 milliGRAM(s) Oral every 6 hours PRN Temp greater or equal to 38C (100.4F)  artificial  tears Solution 1 Drop(s) Both EYES every 4 hours PRN Dry Eyes  ibuprofen  Suspension. 600 milliGRAM(s) Enteral Tube every 6 hours PRN Temp greater or equal to 38C (100.4F)  LORazepam     Tablet 1 milliGRAM(s) Oral every 4 hours PRN Agitation  sodium chloride 0.9% lock flush 10 milliLiter(s) IV Push every 1 hour PRN Pre/post blood products, medications, blood draw, and to maintain line patency    Pertinent Labs:  Na145 mmol/L Glu 148 mg/dL<H> K+ 3.3 mmol/L<L> Cr  0.34 mg/dL<L> BUN 16 mg/dL  Phos 3.0 mg/dL  Alb 1.9 g/dL<L>  Chol --    LDL --    HDL --    Trig 199 mg/dL<H>     CAPILLARY BLOOD GLUCOSE      POCT Blood Glucose.: 153 mg/dL (2021 10:40)  POCT Blood Glucose.: 147 mg/dL (2021 05:26)  POCT Blood Glucose.: 136 mg/dL (2021 23:09)  POCT Blood Glucose.: 160 mg/dL (2021 16:46)        Previous Nutrition Diagnosis:   [ ] Altered GI function  [ ]Inadequate Oral Intake [ ] Swallowing Difficulty   [ ] Altered nutrition related labs [ ] Increased Nutrient Needs [ ] Overweight/Obesity   [ ] Unintended Weight Loss [ ] Food & Nutrition Related Knowledge Deficit [x ] Malnutrition (severe PCM)  [ ] Other:     Nutrition Diagnosis is [x ] ongoing  [ ] resolved [ ] not applicable       Interventions:   Recommend  [ ] Change Diet To:  [ ] Nutrition Supplement  [x ] Nutrition Support: Glucerna 1.5 40 x24 (goal): 960 ml, 1440 kcals, 79 gm protein. MD to monitor. RD available.   [ ] Other:     Monitoring and Evaluation:    [ x ] Tolerance to diet prescription [ x ] weights [ x ] labs[ x ] follow up per protocol  [ ] other: Assessment:   Patient is a 55y old  Male who presents with a chief complaint of SOB (2021 13:45). Covid/ Isolation. Intubated. off Propofol (not on flow record) TF 25 ml/hr, at times. Plan for trach/ PEG noted.      Diet Prescription: Diet, NPO with Tube Feed:   Tube Feeding Modality: Nasogastric  Glucerna 1.5 Leonardo  Total Volume for 24 Hours (mL): 600  Continuous  Starting Tube Feed Rate {mL per Hour}: 10  Increase Tube Feed Rate by (mL): 10     Every hour  Until Goal Tube Feed Rate (mL per Hour): 25  Tube Feed Duration (in Hours): 24  Tube Feed Start Time: 02:00  No Carb Prosource (1pkg = 15gms Protein)     Qty per Day:  2 (21 @ 16:49)        Daily     Daily Weight in k.4 (2021 07:00)  Weight in k.5 (2021 08:00)  Weight in k.7 (2021 07:00)  Weight in k (10 Apr 2021 07:00)  Weight in k.6 (2021 07:30)  Weight in k (2021 07:30)  Weight in k.9 (2021 07:30)  Weight in k.2 (2021 08:00)  Weight in k (2021 07:00)  Weight in k.9 (2021 08:00)  Weight in k.9 (2021 08:00)  Weight in k.4 (2021 08:00)    % Weight Change: 3-4 +generalized edema.    Pertinent Medications: MEDICATIONS  (STANDING):  ALBUTerol    90 MICROgram(s) HFA Inhaler 2 Puff(s) Inhalation every 6 hours  ascorbic acid 1000 milliGRAM(s) Oral daily  aspirin  chewable 81 milliGRAM(s) Oral daily  chlorhexidine 0.12% Liquid 15 milliLiter(s) Oral Mucosa two times a day  chlorhexidine 2% Cloths 1 Application(s) Topical <User Schedule>  fentaNYL   Infusion 4 MICROgram(s)/kG/Hr (33.5 mL/Hr) IV Continuous <Continuous>  insulin lispro (ADMELOG) corrective regimen sliding scale   SubCutaneous every 6 hours  meropenem  IVPB 1000 milliGRAM(s) IV Intermittent every 8 hours  meropenem  IVPB      midazolam Infusion 0.02 mG/kG/Hr (1.68 mL/Hr) IV Continuous <Continuous>  midodrine 5 milliGRAM(s) Oral every 8 hours  pantoprazole  Injectable 40 milliGRAM(s) IV Push daily  phenylephrine    Infusion 0.1 MICROgram(s)/kG/Min (3.15 mL/Hr) IV Continuous <Continuous>  polyethylene glycol 3350 17 Gram(s) Oral daily  senna 2 Tablet(s) Oral at bedtime    MEDICATIONS  (PRN):  acetaminophen    Suspension .. 650 milliGRAM(s) Oral every 6 hours PRN Temp greater or equal to 38C (100.4F)  artificial  tears Solution 1 Drop(s) Both EYES every 4 hours PRN Dry Eyes  ibuprofen  Suspension. 600 milliGRAM(s) Enteral Tube every 6 hours PRN Temp greater or equal to 38C (100.4F)  LORazepam     Tablet 1 milliGRAM(s) Oral every 4 hours PRN Agitation  sodium chloride 0.9% lock flush 10 milliLiter(s) IV Push every 1 hour PRN Pre/post blood products, medications, blood draw, and to maintain line patency    Pertinent Labs:  Na145 mmol/L Glu 148 mg/dL<H> K+ 3.3 mmol/L<L> Cr  0.34 mg/dL<L> BUN 16 mg/dL  Phos 3.0 mg/dL  Alb 1.9 g/dL<L>  Chol --    LDL --    HDL --    Trig 199 mg/dL<H>     CAPILLARY BLOOD GLUCOSE      POCT Blood Glucose.: 153 mg/dL (2021 10:40)  POCT Blood Glucose.: 147 mg/dL (2021 05:26)  POCT Blood Glucose.: 136 mg/dL (2021 23:09)  POCT Blood Glucose.: 160 mg/dL (2021 16:46)        Previous Nutrition Diagnosis:   [ ] Altered GI function  [ ]Inadequate Oral Intake [ ] Swallowing Difficulty   [ ] Altered nutrition related labs [ ] Increased Nutrient Needs [ ] Overweight/Obesity   [ ] Unintended Weight Loss [ ] Food & Nutrition Related Knowledge Deficit [x ] Malnutrition (severe PCM)  [ ] Other:     Nutrition Diagnosis is [x ] ongoing  [ ] resolved [ ] not applicable       Interventions:   Recommend  [ ] Change Diet To:  [ ] Nutrition Supplement  [x ] Nutrition Support: Glucerna 1.5 40 x24 (goal): 960 ml, 1440 kcals, 79 gm protein. MD to monitor. RD available.   [ ] Other:     Monitoring and Evaluation:    [ x ] Tolerance to diet prescription [ x ] weights [ x ] labs[ x ] follow up per protocol  [ ] other:

## 2021-04-14 NOTE — PROGRESS NOTE ADULT - PROBLEM SELECTOR PLAN 7
Patient's brother/Brennan ([phone]) is the identified surrogate; agreeable for trach/peg. MOLST on file: CPR / long term ventilation if needed / long term feeding tube if needed / trial IV fluids / use abx. Patient with high risk of mortality.

## 2021-04-14 NOTE — PROGRESS NOTE ADULT - SUBJECTIVE AND OBJECTIVE BOX
INTERVAL HPI/OVERNIGHT EVENTS: ***    PRESSORS: [ ] YES [ ] NO  WHICH:    ANTIBIOTICS:                  DATE STARTED:  ANTIBIOTICS:                  DATE STARTED:  ANTIBIOTICS:                  DATE STARTED:    Antimicrobial:  meropenem  IVPB 1000 milliGRAM(s) IV Intermittent every 8 hours  meropenem  IVPB        Cardiovascular:  norepinephrine Infusion 0.05 MICROgram(s)/kG/Min IV Continuous <Continuous>  phenylephrine    Infusion 0.1 MICROgram(s)/kG/Min IV Continuous <Continuous>    Pulmonary:  ALBUTerol    90 MICROgram(s) HFA Inhaler 2 Puff(s) Inhalation every 6 hours    Hematalogic:  aspirin  chewable 81 milliGRAM(s) Oral daily  enoxaparin Injectable 40 milliGRAM(s) SubCutaneous two times a day    Other:  acetaminophen    Suspension .. 650 milliGRAM(s) Oral every 6 hours PRN  artificial  tears Solution 1 Drop(s) Both EYES every 4 hours PRN  ascorbic acid 1000 milliGRAM(s) Oral daily  chlorhexidine 0.12% Liquid 15 milliLiter(s) Oral Mucosa two times a day  chlorhexidine 2% Cloths 1 Application(s) Topical <User Schedule>  dexMEDEtomidine Infusion 1 MICROgram(s)/kG/Hr IV Continuous <Continuous>  fentaNYL   Infusion 4 MICROgram(s)/kG/Hr IV Continuous <Continuous>  ibuprofen  Suspension. 600 milliGRAM(s) Enteral Tube every 6 hours PRN  insulin lispro (ADMELOG) corrective regimen sliding scale   SubCutaneous every 6 hours  lactulose Syrup 15 Gram(s) Oral two times a day  LORazepam   Injectable 1 milliGRAM(s) IV Push every 4 hours  pantoprazole  Injectable 40 milliGRAM(s) IV Push daily  polyethylene glycol 3350 17 Gram(s) Oral daily  potassium chloride    Tablet ER 40 milliEquivalent(s) Oral once  potassium chloride  10 mEq/100 mL IVPB 10 milliEquivalent(s) IV Intermittent every 1 hour  senna 2 Tablet(s) Oral at bedtime  sodium chloride 0.9% lock flush 10 milliLiter(s) IV Push every 1 hour PRN    acetaminophen    Suspension .. 650 milliGRAM(s) Oral every 6 hours PRN  ALBUTerol    90 MICROgram(s) HFA Inhaler 2 Puff(s) Inhalation every 6 hours  artificial  tears Solution 1 Drop(s) Both EYES every 4 hours PRN  ascorbic acid 1000 milliGRAM(s) Oral daily  aspirin  chewable 81 milliGRAM(s) Oral daily  chlorhexidine 0.12% Liquid 15 milliLiter(s) Oral Mucosa two times a day  chlorhexidine 2% Cloths 1 Application(s) Topical <User Schedule>  dexMEDEtomidine Infusion 1 MICROgram(s)/kG/Hr IV Continuous <Continuous>  enoxaparin Injectable 40 milliGRAM(s) SubCutaneous two times a day  fentaNYL   Infusion 4 MICROgram(s)/kG/Hr IV Continuous <Continuous>  ibuprofen  Suspension. 600 milliGRAM(s) Enteral Tube every 6 hours PRN  insulin lispro (ADMELOG) corrective regimen sliding scale   SubCutaneous every 6 hours  lactulose Syrup 15 Gram(s) Oral two times a day  LORazepam   Injectable 1 milliGRAM(s) IV Push every 4 hours  meropenem  IVPB 1000 milliGRAM(s) IV Intermittent every 8 hours  meropenem  IVPB      norepinephrine Infusion 0.05 MICROgram(s)/kG/Min IV Continuous <Continuous>  pantoprazole  Injectable 40 milliGRAM(s) IV Push daily  phenylephrine    Infusion 0.1 MICROgram(s)/kG/Min IV Continuous <Continuous>  polyethylene glycol 3350 17 Gram(s) Oral daily  potassium chloride    Tablet ER 40 milliEquivalent(s) Oral once  potassium chloride  10 mEq/100 mL IVPB 10 milliEquivalent(s) IV Intermittent every 1 hour  senna 2 Tablet(s) Oral at bedtime  sodium chloride 0.9% lock flush 10 milliLiter(s) IV Push every 1 hour PRN    Drug Dosing Weight  Height (cm): 167.6 (14 Mar 2021 12:03)  Weight (kg): 83.869 (14 Mar 2021 12:03)  BMI (kg/m2): 29.9 (14 Mar 2021 12:03)  BSA (m2): 1.93 (14 Mar 2021 12:03)    CENTRAL LINE: [ ] YES [ ] NO  LOCATION:   DATE INSERTED:  REMOVE: [ ] YES [ ] NO  EXPLAIN:    RIVERA: [ ] YES [ ] NO    DATE INSERTED:  REMOVE:  [ ] YES [ ] NO  EXPLAIN:    A-LINE:  [ ] YES [ ] NO  LOCATION:   DATE INSERTED:  REMOVE:  [ ] YES [ ] NO  EXPLAIN:    PMH -reviewed admission note, no change since admission  PAST MEDICAL & SURGICAL HISTORY:  No pertinent past medical history    S/P moody  1990s    S/P appendectomy  1990s        ICU Vital Signs Last 24 Hrs  T(C): 38.4 (14 Apr 2021 06:00), Max: 38.9 (13 Apr 2021 10:00)  T(F): 101.2 (14 Apr 2021 06:00), Max: 102 (13 Apr 2021 10:00)  HR: 82 (14 Apr 2021 06:15) (82 - 149)  BP: --  BP(mean): --  ABP: 124/52 (14 Apr 2021 06:15) (74/41 - 170/74)  ABP(mean): 73 (14 Apr 2021 06:15) (52 - 102)  RR: 30 (14 Apr 2021 06:15) (11 - 40)  SpO2: 95% (14 Apr 2021 06:15) (88% - 99%)      ABG - ( 14 Apr 2021 05:56 )  pH, Arterial: 7.31  pH, Blood: x     /  pCO2: 84    /  pO2: 80    / HCO3: 42    / Base Excess: 12.2  /  SaO2: 98                    04-13 @ 07:01  -  04-14 @ 07:00  --------------------------------------------------------  IN: 2452 mL / OUT: 705 mL / NET: 1747 mL        Mode: AC/ CMV (Assist Control/ Continuous Mandatory Ventilation)  RR (machine): 30  FiO2: 70  PEEP: 8  ITime: 0.8  MAP: 18  PC: 20  PIP: 38      PHYSICAL EXAM:    GENERAL: [ ]NAD, [ ]well-groomed, [ ]well-developed  HEAD:  [ ]Atraumatic, [ ]Normocephalic  EYES: [ ]EOMI, [ ]PERRLA, [ ]conjunctiva and sclera clear  ENMT: [ ]No tonsillar erythema, exudates, or enlargement; [ ]Moist mucous membranes, [ ]Good dentition, [ ]No lesions  NECK: [ ]Supple, normal appearance, [ ]No JVD; [ ]Normal thyroid; [ ]Trachea midline  NERVOUS SYSTEM:  [ ]Alert & Oriented X3, [ ]Good concentration; [ ]Motor Strength 5/5 B/L upper and lower extremities; [ ]DTRs 2+ intact and symmetric  CHEST/LUNG: [ ]No chest deformity; [ ]Normal percussion bilaterally; [ ]No rales, rhonchi, wheezing   HEART: [ ]Regular rate and rhythm; [ ]No murmurs, rubs, or gallops  ABDOMEN: [ ]Soft, Nontender, Nondistended; [ ]Bowel sounds present  EXTREMITIES:  [ ]2+ Peripheral Pulses, [ ]No clubbing, cyanosis, or edema  LYMPH: [ ]No lymphadenopathy noted  SKIN: [ ]No rashes or lesions; [ ]Good capillary refill      LABS:  CBC Full  -  ( 14 Apr 2021 03:50 )  WBC Count : 11.35 K/uL  RBC Count : 3.07 M/uL  Hemoglobin : 9.9 g/dL  Hematocrit : 31.8 %  Platelet Count - Automated : 491 K/uL  Mean Cell Volume : 103.6 fl  Mean Cell Hemoglobin : 32.2 pg  Mean Cell Hemoglobin Concentration : 31.1 gm/dL  Auto Neutrophil # : 9.19 K/uL  Auto Lymphocyte # : 1.48 K/uL  Auto Monocyte # : 0.23 K/uL  Auto Eosinophil # : 0.00 K/uL  Auto Basophil # : 0.00 K/uL  Auto Neutrophil % : 75.0 %  Auto Lymphocyte % : 13.0 %  Auto Monocyte % : 2.0 %  Auto Eosinophil % : 0.0 %  Auto Basophil % : 0.0 %    04-14    142  |  102  |  18  ----------------------------<  143<H>  3.3<L>   |  40<H>  |  0.44<L>    Ca    8.4      14 Apr 2021 03:50  Phos  3.0     04-14  Mg     2.1     04-14    TPro  6.0  /  Alb  1.9<L>  /  TBili  0.6  /  DBili  x   /  AST  27  /  ALT  33  /  AlkPhos  95  04-14    PT/INR - ( 14 Apr 2021 03:50 )   PT: 15.3 sec;   INR: 1.30 ratio         PTT - ( 14 Apr 2021 03:50 )  PTT:34.2 sec        RADIOLOGY & ADDITIONAL STUDIES REVIEWED:  ***    [ ]GOALS OF CARE DISCUSSION WITH PATIENT/FAMILY/PROXY:    CRITICAL CARE TIME SPENT: 35 minutes INTERVAL HPI/OVERNIGHT EVENTS: Patient spiked fever overnight,   ABG from morning acidotic, Vent settings were changed with subsequent improvement    PRESSORS: [ x] YES [ ] NO  WHICH: Phenylephrine    ANTIBIOTICS:                  DATE STARTED:  ANTIBIOTICS:                  DATE STARTED:  ANTIBIOTICS:                  DATE STARTED:    Antimicrobial:  meropenem  IVPB 1000 milliGRAM(s) IV Intermittent every 8 hours  meropenem  IVPB        Cardiovascular:  norepinephrine Infusion 0.05 MICROgram(s)/kG/Min IV Continuous <Continuous>  phenylephrine    Infusion 0.1 MICROgram(s)/kG/Min IV Continuous <Continuous>    Pulmonary:  ALBUTerol    90 MICROgram(s) HFA Inhaler 2 Puff(s) Inhalation every 6 hours    Hematalogic:  aspirin  chewable 81 milliGRAM(s) Oral daily  enoxaparin Injectable 40 milliGRAM(s) SubCutaneous two times a day    Other:  acetaminophen    Suspension .. 650 milliGRAM(s) Oral every 6 hours PRN  artificial  tears Solution 1 Drop(s) Both EYES every 4 hours PRN  ascorbic acid 1000 milliGRAM(s) Oral daily  chlorhexidine 0.12% Liquid 15 milliLiter(s) Oral Mucosa two times a day  chlorhexidine 2% Cloths 1 Application(s) Topical <User Schedule>  dexMEDEtomidine Infusion 1 MICROgram(s)/kG/Hr IV Continuous <Continuous>  fentaNYL   Infusion 4 MICROgram(s)/kG/Hr IV Continuous <Continuous>  ibuprofen  Suspension. 600 milliGRAM(s) Enteral Tube every 6 hours PRN  insulin lispro (ADMELOG) corrective regimen sliding scale   SubCutaneous every 6 hours  lactulose Syrup 15 Gram(s) Oral two times a day  LORazepam   Injectable 1 milliGRAM(s) IV Push every 4 hours  pantoprazole  Injectable 40 milliGRAM(s) IV Push daily  polyethylene glycol 3350 17 Gram(s) Oral daily  potassium chloride    Tablet ER 40 milliEquivalent(s) Oral once  potassium chloride  10 mEq/100 mL IVPB 10 milliEquivalent(s) IV Intermittent every 1 hour  senna 2 Tablet(s) Oral at bedtime  sodium chloride 0.9% lock flush 10 milliLiter(s) IV Push every 1 hour PRN    acetaminophen    Suspension .. 650 milliGRAM(s) Oral every 6 hours PRN  ALBUTerol    90 MICROgram(s) HFA Inhaler 2 Puff(s) Inhalation every 6 hours  artificial  tears Solution 1 Drop(s) Both EYES every 4 hours PRN  ascorbic acid 1000 milliGRAM(s) Oral daily  aspirin  chewable 81 milliGRAM(s) Oral daily  chlorhexidine 0.12% Liquid 15 milliLiter(s) Oral Mucosa two times a day  chlorhexidine 2% Cloths 1 Application(s) Topical <User Schedule>  dexMEDEtomidine Infusion 1 MICROgram(s)/kG/Hr IV Continuous <Continuous>  enoxaparin Injectable 40 milliGRAM(s) SubCutaneous two times a day  fentaNYL   Infusion 4 MICROgram(s)/kG/Hr IV Continuous <Continuous>  ibuprofen  Suspension. 600 milliGRAM(s) Enteral Tube every 6 hours PRN  insulin lispro (ADMELOG) corrective regimen sliding scale   SubCutaneous every 6 hours  lactulose Syrup 15 Gram(s) Oral two times a day  LORazepam   Injectable 1 milliGRAM(s) IV Push every 4 hours  meropenem  IVPB 1000 milliGRAM(s) IV Intermittent every 8 hours  meropenem  IVPB      norepinephrine Infusion 0.05 MICROgram(s)/kG/Min IV Continuous <Continuous>  pantoprazole  Injectable 40 milliGRAM(s) IV Push daily  phenylephrine    Infusion 0.1 MICROgram(s)/kG/Min IV Continuous <Continuous>  polyethylene glycol 3350 17 Gram(s) Oral daily  potassium chloride    Tablet ER 40 milliEquivalent(s) Oral once  potassium chloride  10 mEq/100 mL IVPB 10 milliEquivalent(s) IV Intermittent every 1 hour  senna 2 Tablet(s) Oral at bedtime  sodium chloride 0.9% lock flush 10 milliLiter(s) IV Push every 1 hour PRN    Drug Dosing Weight  Height (cm): 167.6 (14 Mar 2021 12:03)  Weight (kg): 83.869 (14 Mar 2021 12:03)  BMI (kg/m2): 29.9 (14 Mar 2021 12:03)  BSA (m2): 1.93 (14 Mar 2021 12:03)    CENTRAL LINE: [x] YES [ ] NO  LOCATION: [facility]  DATE INSERTED: 4/14  REMOVE: [ ] YES [ ] NO  EXPLAIN:    RIVERA: [ ] YES [x ] NO    DATE INSERTED:  REMOVE:  [ ] YES [ ] NO  EXPLAIN:    A-LINE:  [x ] YES [ ] NO  LOCATION:   DATE INSERTED:  REMOVE:  [ ] YES [ ] NO  EXPLAIN:    PMH -reviewed admission note, no change since admission  PAST MEDICAL & SURGICAL HISTORY:  No pertinent past medical history    S/P moody  1990s    S/P appendectomy  1990s        ICU Vital Signs Last 24 Hrs  T(C): 38.4 (14 Apr 2021 06:00), Max: 38.9 (13 Apr 2021 10:00)  T(F): 101.2 (14 Apr 2021 06:00), Max: 102 (13 Apr 2021 10:00)  HR: 82 (14 Apr 2021 06:15) (82 - 149)  BP: --  BP(mean): --  ABP: 124/52 (14 Apr 2021 06:15) (74/41 - 170/74)  ABP(mean): 73 (14 Apr 2021 06:15) (52 - 102)  RR: 30 (14 Apr 2021 06:15) (11 - 40)  SpO2: 95% (14 Apr 2021 06:15) (88% - 99%)      ABG - ( 14 Apr 2021 05:56 )  pH, Arterial: 7.31  pH, Blood: x     /  pCO2: 84    /  pO2: 80    / HCO3: 42    / Base Excess: 12.2  /  SaO2: 98                    04-13 @ 07:01  -  04-14 @ 07:00  --------------------------------------------------------  IN: 2452 mL / OUT: 705 mL / NET: 1747 mL        Mode: AC/ CMV (Assist Control/ Continuous Mandatory Ventilation)  RR (machine): 30  FiO2: 70  PEEP: 8  ITime: 0.8  MAP: 18  PC: 20  PIP: 38      PHYSICAL EXAM:    GENERAL: sedated and intubated , diaphoretic  HEAD:  Atraumatic, Normocephalic  EYES:  Dry eyes,   MOUTH : ETT  NECK: Supple,  No JVD; Normal thyroid; Trachea midline [facility]  NERVOUS SYSTEM:  sedated  CHEST/LUNG: B/L crackles,   HEART: Regular rate and rhythm; No murmurs, rubs, or gallops  ABDOMEN: Soft, Nontender, Nondistended; Bowel sounds present  EXTREMITIES:  b/l upper extremities edema +2,  no edema on lower legs. No clubbing, cyanosis   LYMPH: No lymphadenopathy noted  SKIN: No rashes  Good capillary refill      LABS:  CBC Full  -  ( 14 Apr 2021 03:50 )  WBC Count : 11.35 K/uL  RBC Count : 3.07 M/uL  Hemoglobin : 9.9 g/dL  Hematocrit : 31.8 %  Platelet Count - Automated : 491 K/uL  Mean Cell Volume : 103.6 fl  Mean Cell Hemoglobin : 32.2 pg  Mean Cell Hemoglobin Concentration : 31.1 gm/dL  Auto Neutrophil # : 9.19 K/uL  Auto Lymphocyte # : 1.48 K/uL  Auto Monocyte # : 0.23 K/uL  Auto Eosinophil # : 0.00 K/uL  Auto Basophil # : 0.00 K/uL  Auto Neutrophil % : 75.0 %  Auto Lymphocyte % : 13.0 %  Auto Monocyte % : 2.0 %  Auto Eosinophil % : 0.0 %  Auto Basophil % : 0.0 %    04-14    142  |  102  |  18  ----------------------------<  143<H>  3.3<L>   |  40<H>  |  0.44<L>    Ca    8.4      14 Apr 2021 03:50  Phos  3.0     04-14  Mg     2.1     04-14    TPro  6.0  /  Alb  1.9<L>  /  TBili  0.6  /  DBili  x   /  AST  27  /  ALT  33  /  AlkPhos  95  04-14    PT/INR - ( 14 Apr 2021 03:50 )   PT: 15.3 sec;   INR: 1.30 ratio         PTT - ( 14 Apr 2021 03:50 )  PTT:34.2 sec        RADIOLOGY & ADDITIONAL STUDIES REVIEWED:  Yes    [ ]GOALS OF CARE DISCUSSION WITH PATIENT/FAMILY/PROXY:    CRITICAL CARE TIME SPENT: 35 minutes

## 2021-04-15 ENCOUNTER — APPOINTMENT (OUTPATIENT)
Dept: THORACIC SURGERY | Facility: HOSPITAL | Age: 56
End: 2021-04-15

## 2021-04-15 LAB
ALBUMIN SERPL ELPH-MCNC: 1.7 G/DL — LOW (ref 3.5–5)
ALP SERPL-CCNC: 94 U/L — SIGNIFICANT CHANGE UP (ref 40–120)
ALT FLD-CCNC: 33 U/L DA — SIGNIFICANT CHANGE UP (ref 10–60)
ANION GAP SERPL CALC-SCNC: 1 MMOL/L — LOW (ref 5–17)
ANION GAP SERPL CALC-SCNC: <3 MMOL/L — LOW (ref 5–17)
AST SERPL-CCNC: 29 U/L — SIGNIFICANT CHANGE UP (ref 10–40)
BASE EXCESS BLDA CALC-SCNC: 15.6 MMOL/L — HIGH (ref -2–3)
BASE EXCESS BLDA CALC-SCNC: 21 MMOL/L — HIGH (ref -2–3)
BASOPHILS # BLD AUTO: SIGNIFICANT CHANGE UP K/UL (ref 0–0.2)
BASOPHILS NFR BLD AUTO: SIGNIFICANT CHANGE UP % (ref 0–2)
BILIRUB SERPL-MCNC: 0.5 MG/DL — SIGNIFICANT CHANGE UP (ref 0.2–1.2)
BLOOD GAS COMMENTS ARTERIAL: SIGNIFICANT CHANGE UP
BLOOD GAS COMMENTS ARTERIAL: SIGNIFICANT CHANGE UP
BUN SERPL-MCNC: 17 MG/DL — SIGNIFICANT CHANGE UP (ref 7–18)
BUN SERPL-MCNC: 20 MG/DL — HIGH (ref 7–18)
CALCIUM SERPL-MCNC: 8.1 MG/DL — LOW (ref 8.4–10.5)
CALCIUM SERPL-MCNC: 8.2 MG/DL — LOW (ref 8.4–10.5)
CHLORIDE SERPL-SCNC: 100 MMOL/L — SIGNIFICANT CHANGE UP (ref 96–108)
CHLORIDE SERPL-SCNC: 102 MMOL/L — SIGNIFICANT CHANGE UP (ref 96–108)
CO2 SERPL-SCNC: 40 MMOL/L — HIGH (ref 22–31)
CO2 SERPL-SCNC: >45 MMOL/L — CRITICAL HIGH (ref 22–31)
CREAT SERPL-MCNC: 0.33 MG/DL — LOW (ref 0.5–1.3)
CREAT SERPL-MCNC: 0.43 MG/DL — LOW (ref 0.5–1.3)
D DIMER BLD IA.RAPID-MCNC: 841 NG/ML DDU — HIGH
EOSINOPHIL # BLD AUTO: SIGNIFICANT CHANGE UP K/UL (ref 0–0.5)
EOSINOPHIL NFR BLD AUTO: SIGNIFICANT CHANGE UP % (ref 0–6)
GLUCOSE BLDC GLUCOMTR-MCNC: 109 MG/DL — HIGH (ref 70–99)
GLUCOSE BLDC GLUCOMTR-MCNC: 113 MG/DL — HIGH (ref 70–99)
GLUCOSE BLDC GLUCOMTR-MCNC: 114 MG/DL — HIGH (ref 70–99)
GLUCOSE SERPL-MCNC: 117 MG/DL — HIGH (ref 70–99)
GLUCOSE SERPL-MCNC: 131 MG/DL — HIGH (ref 70–99)
HCO3 BLDA-SCNC: 42 MMOL/L — HIGH (ref 21–28)
HCO3 BLDA-SCNC: 50 MMOL/L — CRITICAL HIGH (ref 21–28)
HCT VFR BLD CALC: 29.5 % — LOW (ref 39–50)
HGB BLD-MCNC: 9.1 G/DL — LOW (ref 13–17)
HOROWITZ INDEX BLDA+IHG-RTO: 60 — SIGNIFICANT CHANGE UP
HOROWITZ INDEX BLDA+IHG-RTO: 70 — SIGNIFICANT CHANGE UP
IMM GRANULOCYTES NFR BLD AUTO: SIGNIFICANT CHANGE UP % (ref 0–1.5)
LYMPHOCYTES # BLD AUTO: SIGNIFICANT CHANGE UP % (ref 13–44)
LYMPHOCYTES # BLD AUTO: SIGNIFICANT CHANGE UP K/UL (ref 1–3.3)
MAGNESIUM SERPL-MCNC: 2 MG/DL — SIGNIFICANT CHANGE UP (ref 1.6–2.6)
MAGNESIUM SERPL-MCNC: 2.2 MG/DL — SIGNIFICANT CHANGE UP (ref 1.6–2.6)
MCHC RBC-ENTMCNC: 30.8 GM/DL — LOW (ref 32–36)
MCHC RBC-ENTMCNC: 31.5 PG — SIGNIFICANT CHANGE UP (ref 27–34)
MCV RBC AUTO: 102.1 FL — HIGH (ref 80–100)
MONOCYTES # BLD AUTO: SIGNIFICANT CHANGE UP K/UL (ref 0–0.9)
MONOCYTES NFR BLD AUTO: SIGNIFICANT CHANGE UP % (ref 2–14)
NEUTROPHILS # BLD AUTO: SIGNIFICANT CHANGE UP K/UL (ref 1.8–7.4)
NEUTROPHILS NFR BLD AUTO: SIGNIFICANT CHANGE UP % (ref 43–77)
NRBC # BLD: 0 /100 WBCS — SIGNIFICANT CHANGE UP (ref 0–0)
PCO2 BLDA: 58 MMHG — HIGH (ref 35–48)
PCO2 BLDA: 73 MMHG — CRITICAL HIGH (ref 35–48)
PH BLDA: 7.44 — SIGNIFICANT CHANGE UP (ref 7.35–7.45)
PH BLDA: 7.47 — HIGH (ref 7.35–7.45)
PHOSPHATE SERPL-MCNC: 1.9 MG/DL — LOW (ref 2.5–4.5)
PHOSPHATE SERPL-MCNC: 2.1 MG/DL — LOW (ref 2.5–4.5)
PHOSPHATE SERPL-MCNC: 2.8 MG/DL — SIGNIFICANT CHANGE UP (ref 2.5–4.5)
PLATELET # BLD AUTO: 447 K/UL — HIGH (ref 150–400)
PO2 BLDA: 144 MMHG — HIGH (ref 83–108)
PO2 BLDA: 64 MMHG — LOW (ref 83–108)
POTASSIUM SERPL-MCNC: 2.8 MMOL/L — CRITICAL LOW (ref 3.5–5.3)
POTASSIUM SERPL-MCNC: 4.2 MMOL/L — SIGNIFICANT CHANGE UP (ref 3.5–5.3)
POTASSIUM SERPL-SCNC: 2.8 MMOL/L — CRITICAL LOW (ref 3.5–5.3)
POTASSIUM SERPL-SCNC: 4.2 MMOL/L — SIGNIFICANT CHANGE UP (ref 3.5–5.3)
PROCALCITONIN SERPL-MCNC: 0.72 NG/ML — HIGH (ref 0.02–0.1)
PROT SERPL-MCNC: 5.6 G/DL — LOW (ref 6–8.3)
RAPID RVP RESULT: SIGNIFICANT CHANGE UP
RBC # BLD: 2.89 M/UL — LOW (ref 4.2–5.8)
RBC # FLD: 13.9 % — SIGNIFICANT CHANGE UP (ref 10.3–14.5)
SAO2 % BLDA: 100 % — SIGNIFICANT CHANGE UP
SAO2 % BLDA: 96 % — SIGNIFICANT CHANGE UP
SARS-COV-2 RNA SPEC QL NAA+PROBE: SIGNIFICANT CHANGE UP
SODIUM SERPL-SCNC: 143 MMOL/L — SIGNIFICANT CHANGE UP (ref 135–145)
SODIUM SERPL-SCNC: 148 MMOL/L — HIGH (ref 135–145)
WBC # BLD: 6.07 K/UL — SIGNIFICANT CHANGE UP (ref 3.8–10.5)
WBC # FLD AUTO: 6.07 K/UL — SIGNIFICANT CHANGE UP (ref 3.8–10.5)

## 2021-04-15 PROCEDURE — 71045 X-RAY EXAM CHEST 1 VIEW: CPT | Mod: 26

## 2021-04-15 RX ORDER — ACETAMINOPHEN 500 MG
650 TABLET ORAL
Refills: 0 | Status: DISCONTINUED | OUTPATIENT
Start: 2021-04-15 | End: 2021-04-20

## 2021-04-15 RX ORDER — IBUPROFEN 200 MG
600 TABLET ORAL
Refills: 0 | Status: DISCONTINUED | OUTPATIENT
Start: 2021-04-15 | End: 2021-04-17

## 2021-04-15 RX ORDER — POTASSIUM CHLORIDE 20 MEQ
10 PACKET (EA) ORAL
Refills: 0 | Status: COMPLETED | OUTPATIENT
Start: 2021-04-15 | End: 2021-04-15

## 2021-04-15 RX ORDER — ALBUMIN HUMAN 25 %
50 VIAL (ML) INTRAVENOUS EVERY 6 HOURS
Refills: 0 | Status: COMPLETED | OUTPATIENT
Start: 2021-04-15 | End: 2021-04-17

## 2021-04-15 RX ORDER — POTASSIUM PHOSPHATE, MONOBASIC POTASSIUM PHOSPHATE, DIBASIC 236; 224 MG/ML; MG/ML
15 INJECTION, SOLUTION INTRAVENOUS ONCE
Refills: 0 | Status: COMPLETED | OUTPATIENT
Start: 2021-04-15 | End: 2021-04-15

## 2021-04-15 RX ORDER — PHENYLEPHRINE HYDROCHLORIDE 10 MG/ML
0.2 INJECTION INTRAVENOUS
Qty: 160 | Refills: 0 | Status: DISCONTINUED | OUTPATIENT
Start: 2021-04-15 | End: 2021-04-16

## 2021-04-15 RX ORDER — FUROSEMIDE 40 MG
40 TABLET ORAL
Refills: 0 | Status: DISCONTINUED | OUTPATIENT
Start: 2021-04-15 | End: 2021-04-16

## 2021-04-15 RX ORDER — ADENOSINE 3 MG/ML
6 INJECTION INTRAVENOUS ONCE
Refills: 0 | Status: COMPLETED | OUTPATIENT
Start: 2021-04-15 | End: 2021-04-15

## 2021-04-15 RX ORDER — ACETAMINOPHEN 500 MG
1000 TABLET ORAL ONCE
Refills: 0 | Status: COMPLETED | OUTPATIENT
Start: 2021-04-15 | End: 2021-04-15

## 2021-04-15 RX ADMIN — POLYETHYLENE GLYCOL 3350 17 GRAM(S): 17 POWDER, FOR SOLUTION ORAL at 12:15

## 2021-04-15 RX ADMIN — Medication 50 MILLILITER(S): at 23:00

## 2021-04-15 RX ADMIN — SENNA PLUS 2 TABLET(S): 8.6 TABLET ORAL at 22:23

## 2021-04-15 RX ADMIN — FENTANYL CITRATE 33.5 MICROGRAM(S)/KG/HR: 50 INJECTION INTRAVENOUS at 20:31

## 2021-04-15 RX ADMIN — Medication 50 MILLILITER(S): at 13:14

## 2021-04-15 RX ADMIN — MEROPENEM 100 MILLIGRAM(S): 1 INJECTION INTRAVENOUS at 05:23

## 2021-04-15 RX ADMIN — MIDAZOLAM HYDROCHLORIDE 1.68 MG/KG/HR: 1 INJECTION, SOLUTION INTRAMUSCULAR; INTRAVENOUS at 05:23

## 2021-04-15 RX ADMIN — Medication 100 MILLIEQUIVALENT(S): at 00:30

## 2021-04-15 RX ADMIN — CHLORHEXIDINE GLUCONATE 1 APPLICATION(S): 213 SOLUTION TOPICAL at 05:23

## 2021-04-15 RX ADMIN — CHLORHEXIDINE GLUCONATE 15 MILLILITER(S): 213 SOLUTION TOPICAL at 05:23

## 2021-04-15 RX ADMIN — CHLORHEXIDINE GLUCONATE 15 MILLILITER(S): 213 SOLUTION TOPICAL at 17:10

## 2021-04-15 RX ADMIN — MIDAZOLAM HYDROCHLORIDE 1.68 MG/KG/HR: 1 INJECTION, SOLUTION INTRAMUSCULAR; INTRAVENOUS at 13:23

## 2021-04-15 RX ADMIN — FENTANYL CITRATE 33.5 MICROGRAM(S)/KG/HR: 50 INJECTION INTRAVENOUS at 13:23

## 2021-04-15 RX ADMIN — Medication 50 MILLILITER(S): at 10:41

## 2021-04-15 RX ADMIN — ALBUTEROL 2 PUFF(S): 90 AEROSOL, METERED ORAL at 16:11

## 2021-04-15 RX ADMIN — Medication 1000 MILLIGRAM(S): at 12:55

## 2021-04-15 RX ADMIN — Medication 40 MILLIGRAM(S): at 17:11

## 2021-04-15 RX ADMIN — Medication 50 MILLILITER(S): at 17:11

## 2021-04-15 RX ADMIN — Medication 100 MILLIEQUIVALENT(S): at 01:45

## 2021-04-15 RX ADMIN — MEROPENEM 100 MILLIGRAM(S): 1 INJECTION INTRAVENOUS at 13:17

## 2021-04-15 RX ADMIN — MIDODRINE HYDROCHLORIDE 5 MILLIGRAM(S): 2.5 TABLET ORAL at 22:22

## 2021-04-15 RX ADMIN — Medication 650 MILLIGRAM(S): at 21:39

## 2021-04-15 RX ADMIN — FENTANYL CITRATE 33.5 MICROGRAM(S)/KG/HR: 50 INJECTION INTRAVENOUS at 05:23

## 2021-04-15 RX ADMIN — Medication 600 MILLIGRAM(S): at 17:10

## 2021-04-15 RX ADMIN — MIDODRINE HYDROCHLORIDE 5 MILLIGRAM(S): 2.5 TABLET ORAL at 05:24

## 2021-04-15 RX ADMIN — Medication 40 MILLIGRAM(S): at 10:44

## 2021-04-15 RX ADMIN — ENOXAPARIN SODIUM 40 MILLIGRAM(S): 100 INJECTION SUBCUTANEOUS at 05:23

## 2021-04-15 RX ADMIN — Medication 1000 MILLIGRAM(S): at 11:14

## 2021-04-15 RX ADMIN — ADENOSINE 6 MILLIGRAM(S): 3 INJECTION INTRAVENOUS at 12:00

## 2021-04-15 RX ADMIN — Medication 600 MILLIGRAM(S): at 17:40

## 2021-04-15 RX ADMIN — ALBUTEROL 2 PUFF(S): 90 AEROSOL, METERED ORAL at 03:37

## 2021-04-15 RX ADMIN — Medication 650 MILLIGRAM(S): at 20:39

## 2021-04-15 RX ADMIN — ALBUTEROL 2 PUFF(S): 90 AEROSOL, METERED ORAL at 08:48

## 2021-04-15 RX ADMIN — MEROPENEM 100 MILLIGRAM(S): 1 INJECTION INTRAVENOUS at 22:23

## 2021-04-15 RX ADMIN — Medication 1 DROP(S): at 12:00

## 2021-04-15 RX ADMIN — ENOXAPARIN SODIUM 40 MILLIGRAM(S): 100 INJECTION SUBCUTANEOUS at 17:11

## 2021-04-15 RX ADMIN — Medication 81 MILLIGRAM(S): at 11:15

## 2021-04-15 RX ADMIN — PANTOPRAZOLE SODIUM 40 MILLIGRAM(S): 20 TABLET, DELAYED RELEASE ORAL at 11:27

## 2021-04-15 RX ADMIN — Medication 100 MILLIEQUIVALENT(S): at 02:45

## 2021-04-15 RX ADMIN — Medication 400 MILLIGRAM(S): at 12:25

## 2021-04-15 RX ADMIN — ALBUTEROL 2 PUFF(S): 90 AEROSOL, METERED ORAL at 20:31

## 2021-04-15 RX ADMIN — POTASSIUM PHOSPHATE, MONOBASIC POTASSIUM PHOSPHATE, DIBASIC 62.5 MILLIMOLE(S): 236; 224 INJECTION, SOLUTION INTRAVENOUS at 12:24

## 2021-04-15 RX ADMIN — POTASSIUM PHOSPHATE, MONOBASIC POTASSIUM PHOSPHATE, DIBASIC 62.5 MILLIMOLE(S): 236; 224 INJECTION, SOLUTION INTRAVENOUS at 00:46

## 2021-04-15 RX ADMIN — MIDAZOLAM HYDROCHLORIDE 1.68 MG/KG/HR: 1 INJECTION, SOLUTION INTRAMUSCULAR; INTRAVENOUS at 22:22

## 2021-04-15 NOTE — CHART NOTE - NSCHARTNOTEFT_GEN_A_CORE
Called patient's brother Mr Pope via  [employer] ID [phone] and it went on voicemail. Left a message. Will try to call back again

## 2021-04-15 NOTE — PROGRESS NOTE ADULT - SUBJECTIVE AND OBJECTIVE BOX
Patient is a 55y old  Male who presents with a chief complaint of SOB (15 Apr 2021 07:54)  patient is intubated,  sedated on mechanical ventilation and in NAD. Currently on 60% FIO2 with a sat of 96%  INTERVAL HPI/OVERNIGHT EVENTS:      VITAL SIGNS:  T(F): 97 (04-15-21 @ 08:00)  HR: 118 (04-15-21 @ 09:45)  BP: 100/57 (04-15-21 @ 09:30)  RR: 20 (04-15-21 @ 09:45)  SpO2: 95% (04-15-21 @ 09:45)  Wt(kg): --  I&O's Detail    14 Apr 2021 07:01  -  15 Apr 2021 07:00  --------------------------------------------------------  IN:    Dexmedetomidine: 21 mL    Enteral Tube Flush: 80 mL    FentaNYL: 806.4 mL    Glucerna 1.5: 595 mL    IV PiggyBack: 300 mL    IV PiggyBack: 250 mL    IV PiggyBack: 200 mL    IV PiggyBack: 400 mL    Midazolam: 228.3 mL    Phenylephrine: 172.8 mL  Total IN: 3053.5 mL    OUT:    Chest Tube (mL): 0 mL    Incontinent per Condom Catheter (mL): 550 mL    Nasogastric/Oral tube (mL): 50 mL    Norepinephrine: 0 mL  Total OUT: 600 mL    Total NET: 2453.5 mL      15 Apr 2021 07:01  -  15 Apr 2021 10:44  --------------------------------------------------------  IN:    FentaNYL: 33.6 mL    Midazolam: 10.1 mL  Total IN: 43.7 mL    OUT:    Phenylephrine: 0 mL  Total OUT: 0 mL    Total NET: 43.7 mL        Mode: AC/ CMV (Assist Control/ Continuous Mandatory Ventilation)  RR (machine): 30  TV (machine): 400  FiO2: 60  PEEP: 8  ITime: 0.8  MAP: 21  PIP: 42        REVIEW OF SYSTEMS:    CONSTITUTIONAL:  No fevers, chills, sweats    HEENT:  Eyes:  No diplopia or blurred vision. ENT:  No earache, sore throat or runny nose.    CARDIOVASCULAR:  No pressure, squeezing, tightness, or heaviness about the chest; no palpitations.    RESPIRATORY:  Per HPI    GASTROINTESTINAL:  No abdominal pain, nausea, vomiting or diarrhea.    GENITOURINARY:  No dysuria, frequency or urgency.    NEUROLOGIC:  No paresthesias, fasciculations, seizures or weakness.    PSYCHIATRIC:  No disorder of thought or mood.      PHYSICAL EXAM:    Constitutional: Well developed and nourished  Eyes:Perrla  ENMT: normal  Neck:supple  Respiratory: good air entry  Cardiovascular: S1 S2 regular  Gastrointestinal: Soft, Non tender  Extremities: No edema  Vascular:normal  Neurological:Sedated  Musculoskeletal:Normal      MEDICATIONS  (STANDING):  albumin human 25% IVPB 50 milliLiter(s) IV Intermittent every 6 hours  ALBUTerol    90 MICROgram(s) HFA Inhaler 2 Puff(s) Inhalation every 6 hours  ascorbic acid 1000 milliGRAM(s) Oral daily  aspirin  chewable 81 milliGRAM(s) Oral daily  chlorhexidine 0.12% Liquid 15 milliLiter(s) Oral Mucosa two times a day  chlorhexidine 2% Cloths 1 Application(s) Topical <User Schedule>  enoxaparin Injectable 40 milliGRAM(s) SubCutaneous two times a day  fentaNYL   Infusion 4 MICROgram(s)/kG/Hr (33.5 mL/Hr) IV Continuous <Continuous>  furosemide   Injectable 40 milliGRAM(s) IV Push two times a day  insulin lispro (ADMELOG) corrective regimen sliding scale   SubCutaneous every 6 hours  meropenem  IVPB 1000 milliGRAM(s) IV Intermittent every 8 hours  meropenem  IVPB      midazolam Infusion 0.02 mG/kG/Hr (1.68 mL/Hr) IV Continuous <Continuous>  midodrine 5 milliGRAM(s) Oral every 8 hours  pantoprazole  Injectable 40 milliGRAM(s) IV Push daily  phenylephrine    Infusion 0.2 MICROgram(s)/kG/Min (3.15 mL/Hr) IV Continuous <Continuous>  polyethylene glycol 3350 17 Gram(s) Oral daily  senna 2 Tablet(s) Oral at bedtime    MEDICATIONS  (PRN):  acetaminophen    Suspension .. 650 milliGRAM(s) Oral every 6 hours PRN Temp greater or equal to 38C (100.4F)  artificial  tears Solution 1 Drop(s) Both EYES every 4 hours PRN Dry Eyes  ibuprofen  Suspension. 600 milliGRAM(s) Enteral Tube every 6 hours PRN Temp greater or equal to 38C (100.4F)  LORazepam     Tablet 1 milliGRAM(s) Oral every 4 hours PRN Agitation  sodium chloride 0.9% lock flush 10 milliLiter(s) IV Push every 1 hour PRN Pre/post blood products, medications, blood draw, and to maintain line patency      Allergies    No Known Allergies    Intolerances        LABS:                        9.1    6.07  )-----------( 447      ( 15 Apr 2021 06:08 )             29.5     04-15    143  |  102  |  17  ----------------------------<  117<H>  4.2   |  40<H>  |  0.33<L>    Ca    8.2<L>      15 Apr 2021 06:08  Phos  1.9     04-15  Mg     2.2     04-15    TPro  5.6<L>  /  Alb  1.7<L>  /  TBili  0.5  /  DBili  x   /  AST  29  /  ALT  33  /  AlkPhos  94  04-15    PT/INR - ( 14 Apr 2021 03:50 )   PT: 15.3 sec;   INR: 1.30 ratio         PTT - ( 14 Apr 2021 03:50 )  PTT:34.2 sec    ABG - ( 15 Apr 2021 03:19 )  pH, Arterial: 7.47  pH, Blood: x     /  pCO2: 58    /  pO2: 144   / HCO3: 42    / Base Excess: 15.6  /  SaO2: 100                   CAPILLARY BLOOD GLUCOSE      POCT Blood Glucose.: 113 mg/dL (15 Apr 2021 05:29)  POCT Blood Glucose.: 157 mg/dL (14 Apr 2021 23:12)  POCT Blood Glucose.: 133 mg/dL (14 Apr 2021 16:35)    pro-bnp -- 04-15 @ 06:08     d-dimer 841  04-15 @ 06:08  pro-bnp -- 04-12 @ 04:26     d-dimer 1313  04-12 @ 04:26  pro-bnp -- 04-09 @ 04:38     d-dimer 507  04-09 @ 04:38      RADIOLOGY & ADDITIONAL TESTS:    CXR:  x< from: Xray Chest 1 View- PORTABLE-Urgent (Xray Chest 1 View- PORTABLE-Urgent .) (04.14.21 @ 06:53) >  IMPRESSION: 4/13/2021 6:25 PM:  No interval change..    4/14/2021 6:29 AM:  INCREASING BILATERAL  MULTIFOCAL AND DIFFUSE ILL-DEFINED AIRSPACE OPACITIEs.  Catheters and tubes in place.      < end of copied text >    Ct scan chest:    ekg;    echo:

## 2021-04-15 NOTE — PROGRESS NOTE ADULT - SUBJECTIVE AND OBJECTIVE BOX
ICU visit:  55y Male is under our care for    REVIEW OF SYSTEMS:  [  ] Not able to illicit  General:	  Chest:	  GI:	  :  Skin:	  Musculoskeletal:	  Neuro:	    MEDS:  meropenem  IVPB 1000 milliGRAM(s) IV Intermittent every 8 hours  meropenem  IVPB        ALLERGIES: Allergies    No Known Allergies    Intolerances        VITALS:  ICU Vital Signs Last 24 Hrs  T(C): 36.1 (15 Apr 2021 08:00), Max: 37.7 (14 Apr 2021 12:30)  T(F): 97 (15 Apr 2021 08:00), Max: 99.8 (14 Apr 2021 12:30)  HR: 118 (15 Apr 2021 09:45) (82 - 137)  BP: 100/57 (15 Apr 2021 09:30) (100/57 - 163/75)  BP(mean): 68 (15 Apr 2021 09:30) (64 - 97)  ABP: 165/70 (14 Apr 2021 17:00) (79/46 - 165/70)  ABP(mean): 278 (14 Apr 2021 17:00) (57 - 278)  RR: 20 (15 Apr 2021 09:45) (0 - 34)  SpO2: 95% (15 Apr 2021 09:45) (89% - 100%)      PHYSICAL EXAM:  HEENT:  Neck:  Respiratory:  Cardiovascular:  Gastrointestinal:  Extremities:  Skin:  Ortho:  Neuro:    LABS/DIAGNOSTIC TESTS:                        9.1    6.07  )-----------( 447      ( 15 Apr 2021 06:08 )             29.5     WBC Count: 6.07 K/uL (04-15 @ 06:08)  WBC Count: 11.35 K/uL (04-14 @ 03:50)  WBC Count: 8.73 K/uL (04-13 @ 04:25)  WBC Count: 11.09 K/uL (04-12 @ 04:26)  WBC Count: 24.03 K/uL (04-11 @ 04:31)    04-15    143  |  102  |  17  ----------------------------<  117<H>  4.2   |  40<H>  |  0.33<L>    Ca    8.2<L>      15 Apr 2021 06:08  Phos  1.9     04-15  Mg     2.2     04-15    TPro  5.6<L>  /  Alb  1.7<L>  /  TBili  0.5  /  DBili  x   /  AST  29  /  ALT  33  /  AlkPhos  94  04-15        ABG - ( 15 Apr 2021 03:19 )  pH: 7.47  /  pCO2: 58    /  pO2: 144   / HCO3: 42    / Base Excess: 15.6  /  SaO2: 100               CULTURES:   .Urine Clean Catch (Midstream)  03-15 @ 00:52   No growth  --  --        RADIOLOGY:  no new studies ICU visit:  55y Male is under our care for pneumonia and fevers.  Patient was seen in the ICU still intubated and vent dependent with an FI02 of 60% and an oxygen saturation of 98%.  Patient is currently not on any pressors with a blood pressure of 151/86.  He had his Left sided chest tube removed earlier today, and patient remains afebrile with WBC count WNL.      REVIEW OF SYSTEMS:  [ x ] Not able to elicit      MEDS:  meropenem  IVPB 1000 milliGRAM(s) IV Intermittent every 8 hours  meropenem  IVPB        ALLERGIES: Allergies    No Known Allergies    Intolerances        VITALS:  ICU Vital Signs Last 24 Hrs  T(C): 36.1 (15 Apr 2021 08:00), Max: 37.7 (14 Apr 2021 12:30)  T(F): 97 (15 Apr 2021 08:00), Max: 99.8 (14 Apr 2021 12:30)  HR: 118 (15 Apr 2021 09:45) (82 - 137)  BP: 100/57 (15 Apr 2021 09:30) (100/57 - 163/75)  BP(mean): 68 (15 Apr 2021 09:30) (64 - 97)  ABP: 165/70 (14 Apr 2021 17:00) (79/46 - 165/70)  ABP(mean): 278 (14 Apr 2021 17:00) (57 - 278)  RR: 20 (15 Apr 2021 09:45) (0 - 34)  SpO2: 95% (15 Apr 2021 09:45) (89% - 100%)      PHYSICAL EXAM:  Constitutional: ETT  HEENT: normocephalic with moist oral mucosa  Neck: supple, right IJ, no JVD  Respiratory: lungs clear no rales no rhonchi  Cardiovascular: S1 S2 reg no murmurs  Gastrointestinal: +BS with soft, nondistended abdomen; nontender, no guarding, no rigidity, no organomegaly  : Cerda catheter in place  Extremities: bilateral hand jamia + 2  Skin: no rashes  Ortho: no jt swelling  Neuro: AAO x 0    LABS/DIAGNOSTIC TESTS:                        9.1    6.07  )-----------( 447      ( 15 Apr 2021 06:08 )             29.5     WBC Count: 6.07 K/uL (04-15 @ 06:08)  WBC Count: 11.35 K/uL (04-14 @ 03:50)  WBC Count: 8.73 K/uL (04-13 @ 04:25)  WBC Count: 11.09 K/uL (04-12 @ 04:26)  WBC Count: 24.03 K/uL (04-11 @ 04:31)    04-15    143  |  102  |  17  ----------------------------<  117<H>  4.2   |  40<H>  |  0.33<L>    Ca    8.2<L>      15 Apr 2021 06:08  Phos  1.9     04-15  Mg     2.2     04-15    TPro  5.6<L>  /  Alb  1.7<L>  /  TBili  0.5  /  DBili  x   /  AST  29  /  ALT  33  /  AlkPhos  94  04-15        ABG - ( 15 Apr 2021 03:19 )  pH: 7.47  /  pCO2: 58    /  pO2: 144   / HCO3: 42    / Base Excess: 15.6  /  SaO2: 100               CULTURES:   .Urine Clean Catch (Midstream)  03-15 @ 00:52   No growth  --  --        RADIOLOGY:  no new studies

## 2021-04-15 NOTE — PROGRESS NOTE ADULT - ATTENDING COMMENTS
55 yr old  man , non smoker with  moody 1990s presented 3/14 with x9 days worsening cough, subjective fevers, and SOB, with x2-3 days dysuria and central, non-radiating, constant CP. Admitted to medicine unit  for acute hypoxic respiratory failure secondary to pna from covid-19 infection .     Assessment:  1. Acute hypoxic respiratory failure  2 Covid-19 infection   3. Transaminitis  4. Prediabetes  5. Bilateral pneumothorax  6. Septic shock     Plan   -Cont. antibiotics ID consultation appreciated  -pat intubated 3/29   -Mechanical ventilation with lung protective strategy, titrate down fio2 as tolerated  -adjust vent as per ABG  -Vasopressor support  -PTX  improved post chest tube placement  -Cont. precedex and fentanyl  -Thoracic surgery consult for PEG/Trach placement, once hemodynamically stable  -Cont. versed/fentanyl as patient asynchronous with the vent  -Albumin/lasix for diuresis  -isolation : contact and air borne   -monitor biomarkers daily, trending down   -Tube feedings, bowel regimen  -dvt/gi prophy  -hemodynamic monitoring   -Prognosis is guarded

## 2021-04-15 NOTE — PROGRESS NOTE ADULT - SUBJECTIVE AND OBJECTIVE BOX
Patient is a 55y old  Male who presents with a chief complaint of SOB (14 Apr 2021 13:45)    pt seen in icu [ x ], reg med floor [   ], bed [ x ], chair at bedside [   ], a+o x3 [  ], sedated [x  ],  nad [x  ]    andrea [ x ], ngt feed [x  ], et tube [ x ], cent line [x  ],          Allergies    No Known Allergies        Vitals    T(F): 96.5 (04-15-21 @ 05:00), Max: 100 (04-14-21 @ 10:15)  HR: 100 (04-15-21 @ 06:00) (81 - 137)  BP: 135/67 (04-15-21 @ 06:00) (102/75 - 163/75)  RR: 26 (04-15-21 @ 06:00) (0 - 50)  SpO2: 100% (04-15-21 @ 06:00) (89% - 100%)  Wt(kg): --  CAPILLARY BLOOD GLUCOSE      POCT Blood Glucose.: 113 mg/dL (15 Apr 2021 05:29)      Labs                          9.1    6.07  )-----------( 447      ( 15 Apr 2021 06:08 )             29.5       04-14    143  |  102  |  18  ----------------------------<  146<H>  3.4<L>   |  41<H>  |  0.58    Ca    8.2<L>      14 Apr 2021 23:24  Phos  1.3     04-14  Mg     2.0     04-14    TPro  6.0  /  Alb  1.9<L>  /  TBili  0.6  /  DBili  x   /  AST  27  /  ALT  33  /  AlkPhos  95  04-14            .Urine Clean Catch (Midstream)  03-15 @ 00:52   No growth  --  --          Radiology Results      Meds    MEDICATIONS  (STANDING):  ALBUTerol    90 MICROgram(s) HFA Inhaler 2 Puff(s) Inhalation every 6 hours  ascorbic acid 1000 milliGRAM(s) Oral daily  aspirin  chewable 81 milliGRAM(s) Oral daily  chlorhexidine 0.12% Liquid 15 milliLiter(s) Oral Mucosa two times a day  chlorhexidine 2% Cloths 1 Application(s) Topical <User Schedule>  enoxaparin Injectable 40 milliGRAM(s) SubCutaneous two times a day  fentaNYL   Infusion 4 MICROgram(s)/kG/Hr (33.5 mL/Hr) IV Continuous <Continuous>  insulin lispro (ADMELOG) corrective regimen sliding scale   SubCutaneous every 6 hours  meropenem  IVPB 1000 milliGRAM(s) IV Intermittent every 8 hours  meropenem  IVPB      midazolam Infusion 0.02 mG/kG/Hr (1.68 mL/Hr) IV Continuous <Continuous>  midodrine 5 milliGRAM(s) Oral every 8 hours  pantoprazole  Injectable 40 milliGRAM(s) IV Push daily  phenylephrine    Infusion 0.1 MICROgram(s)/kG/Min (3.15 mL/Hr) IV Continuous <Continuous>  polyethylene glycol 3350 17 Gram(s) Oral daily  senna 2 Tablet(s) Oral at bedtime      MEDICATIONS  (PRN):  acetaminophen    Suspension .. 650 milliGRAM(s) Oral every 6 hours PRN Temp greater or equal to 38C (100.4F)  artificial  tears Solution 1 Drop(s) Both EYES every 4 hours PRN Dry Eyes  ibuprofen  Suspension. 600 milliGRAM(s) Enteral Tube every 6 hours PRN Temp greater or equal to 38C (100.4F)  LORazepam     Tablet 1 milliGRAM(s) Oral every 4 hours PRN Agitation  sodium chloride 0.9% lock flush 10 milliLiter(s) IV Push every 1 hour PRN Pre/post blood products, medications, blood draw, and to maintain line patency      Physical Exam    Neuro :  no focal deficits  Respiratory: CTA B/L  CV: RRR, S1S2, no murmurs,   Abdominal: Soft, NT, ND +BS,  Extremities: No edema, + peripheral pulses      ASSESSMENT    Hypoxemia 2nd to covid pna   transaminitis  prediabetes  h/o appendectomy  cholecystectomy        PLAN    contact and airborne isolation  d/c remdesevir given covid ab positive noted   completed dexamethasone   started pulse steroids for 3 days - 250mg solumedrol bid now tapered off 4/14/21  cont asa, vit c,    cont albuterol inhaler   pulm f/u  procalcitonin, D-dimer, crp, ldh, ferritin, lactate noted ,    tmx 101.2   cont tylenol prn,   cont robitussin prn   pt on fentanyl, phenylephrine, norepi and precedex drip  s/p intubation 3/29/21  O2 sat (88% - 99%) mech vent  O2 via mech vent and taper fio2 as tolerated   serial abg's   vent mgmt as per icu  xray 3/19/21 with pneumomediastinum  rept cxr with New trace right apical pneumothorax. New mild left apical pneumothorax. Grossly stable small pneumomediastinum.  Soft tissue emphysema at the neck bases bilaterally. Grossly stable bilateral pulmonary infiltrates noted.   cxr 2/24 with No evidence of pneumothorax can be appreciated on the available image. This may be related to patient positioning. Evidence of pneumomediastinum and subcutaneous emphysema in the lower neck is again noted. There are patchy bibasilar infiltrates and elevated right hemidiaphragm noted.   cxr 3/29/21 with No significant change bilateral infiltrates. There is a small simple left apical pneumothorax. No significant pleural effusion. Bilateral subcutaneous emphysema similar to prior.   pt intubated 6AM 3/29/21,   XRs on 7AM and 5PM with no obvious increase of ptx  cxr 4/4/21 with Improving bilateral airspace disease noted    cxr 4/8/21 with Small left pneumothorax noted.  thoracic surg f/u   s/p L chest tube placement  CXR 4/11/21 with : No interval change compared to one day prior. No pneumothorax noted above.   Daily CXR  Monitor O2 status   pt for possible trach on 4/15/21 if fio2 <50%  id cons  abx changed to meropenem  f/u blood cx  d/c andrea  lispro ss   prognosis poor  cont current meds           Patient is a 55y old  Male who presents with a chief complaint of SOB (14 Apr 2021 13:45)    pt seen in icu [ x ], reg med floor [   ], bed [ x ], chair at bedside [   ], a+o x3 [  ], sedated [x  ],  nad [x  ]    andrea [ x ], ngt feed [x  ], et tube [ x ], cent line [x  ],          Allergies    No Known Allergies        Vitals    T(F): 96.5 (04-15-21 @ 05:00), Max: 100 (04-14-21 @ 10:15)  HR: 100 (04-15-21 @ 06:00) (81 - 137)  BP: 135/67 (04-15-21 @ 06:00) (102/75 - 163/75)  RR: 26 (04-15-21 @ 06:00) (0 - 50)  SpO2: 100% (04-15-21 @ 06:00) (89% - 100%)  Wt(kg): --  CAPILLARY BLOOD GLUCOSE      POCT Blood Glucose.: 113 mg/dL (15 Apr 2021 05:29)      Labs                          9.1    6.07  )-----------( 447      ( 15 Apr 2021 06:08 )             29.5       04-14    143  |  102  |  18  ----------------------------<  146<H>  3.4<L>   |  41<H>  |  0.58    Ca    8.2<L>      14 Apr 2021 23:24  Phos  1.3     04-14  Mg     2.0     04-14    TPro  6.0  /  Alb  1.9<L>  /  TBili  0.6  /  DBili  x   /  AST  27  /  ALT  33  /  AlkPhos  95  04-14            .Urine Clean Catch (Midstream)  03-15 @ 00:52   No growth  --  --          Radiology Results      Meds    MEDICATIONS  (STANDING):  ALBUTerol    90 MICROgram(s) HFA Inhaler 2 Puff(s) Inhalation every 6 hours  ascorbic acid 1000 milliGRAM(s) Oral daily  aspirin  chewable 81 milliGRAM(s) Oral daily  chlorhexidine 0.12% Liquid 15 milliLiter(s) Oral Mucosa two times a day  chlorhexidine 2% Cloths 1 Application(s) Topical <User Schedule>  enoxaparin Injectable 40 milliGRAM(s) SubCutaneous two times a day  fentaNYL   Infusion 4 MICROgram(s)/kG/Hr (33.5 mL/Hr) IV Continuous <Continuous>  insulin lispro (ADMELOG) corrective regimen sliding scale   SubCutaneous every 6 hours  meropenem  IVPB 1000 milliGRAM(s) IV Intermittent every 8 hours  meropenem  IVPB      midazolam Infusion 0.02 mG/kG/Hr (1.68 mL/Hr) IV Continuous <Continuous>  midodrine 5 milliGRAM(s) Oral every 8 hours  pantoprazole  Injectable 40 milliGRAM(s) IV Push daily  phenylephrine    Infusion 0.1 MICROgram(s)/kG/Min (3.15 mL/Hr) IV Continuous <Continuous>  polyethylene glycol 3350 17 Gram(s) Oral daily  senna 2 Tablet(s) Oral at bedtime      MEDICATIONS  (PRN):  acetaminophen    Suspension .. 650 milliGRAM(s) Oral every 6 hours PRN Temp greater or equal to 38C (100.4F)  artificial  tears Solution 1 Drop(s) Both EYES every 4 hours PRN Dry Eyes  ibuprofen  Suspension. 600 milliGRAM(s) Enteral Tube every 6 hours PRN Temp greater or equal to 38C (100.4F)  LORazepam     Tablet 1 milliGRAM(s) Oral every 4 hours PRN Agitation  sodium chloride 0.9% lock flush 10 milliLiter(s) IV Push every 1 hour PRN Pre/post blood products, medications, blood draw, and to maintain line patency      Physical Exam    Neuro :  no focal deficits  Respiratory: CTA B/L  CV: RRR, S1S2, no murmurs,   Abdominal: Soft, NT, ND +BS,  Extremities: No edema, + peripheral pulses      ASSESSMENT    Hypoxemia 2nd to covid pna   transaminitis  prediabetes  h/o appendectomy  cholecystectomy        PLAN    contact and airborne isolation  d/c remdesevir given covid ab positive noted   completed dexamethasone   started pulse steroids for 3 days - 250mg solumedrol bid now tapered off 4/14/21  cont asa, vit c,    cont albuterol inhaler   pulm f/u  procalcitonin, D-dimer, crp, ldh, ferritin, lactate noted ,    tmx 100  cont tylenol prn,   cont robitussin prn   pt on fentanyl, phenylephrine, midazolam drip  s/p intubation 3/29/21  O2 sat (89% - 100%) mech vent  O2 via mech vent and taper fio2 as tolerated   serial abg's   vent mgmt as per icu  xray 3/19/21 with pneumomediastinum  rept cxr with New trace right apical pneumothorax. New mild left apical pneumothorax. Grossly stable small pneumomediastinum.  Soft tissue emphysema at the neck bases bilaterally. Grossly stable bilateral pulmonary infiltrates noted.   cxr 2/24 with No evidence of pneumothorax can be appreciated on the available image. This may be related to patient positioning. Evidence of pneumomediastinum and subcutaneous emphysema in the lower neck is again noted. There are patchy bibasilar infiltrates and elevated right hemidiaphragm noted.   cxr 3/29/21 with No significant change bilateral infiltrates. There is a small simple left apical pneumothorax. No significant pleural effusion. Bilateral subcutaneous emphysema similar to prior.   pt intubated 6AM 3/29/21,   XRs on 7AM and 5PM with no obvious increase of ptx  cxr 4/4/21 with Improving bilateral airspace disease noted    cxr 4/8/21 with Small left pneumothorax noted.  thoracic surg f/u   s/p L chest tube placement  CXR 4/11/21 with : No interval change compared to one day prior. No pneumothorax noted above.   Daily CXR  Monitor O2 status   pt for possible trach on 4/15/21 if fio2 <50%  id f/u   Continue Meropenem 1g iv q8  f/u blood cx  andrea d/c'd  lispro ss   prognosis poor  cont current meds

## 2021-04-15 NOTE — PROGRESS NOTE ADULT - ASSESSMENT
Assessment and Recommendation:   Problem/Recommendation - 1:  Problem: COVID-19. Recommendation: isolation precautions  cont mechanical ventilation  taper FIO2 as tolerated  Taper pressors meds as feasible  Wean as tolerated  pulmonary toilet  NGT nutrition  ICU management.   Monitor oxygen sat  Monitor LFT, LDH, CRP, D-Dimer, Ferritin and procalcitonin  Vit C, D and zinc supp  Montelukast 10 mgs po Qhs  IV steroids.  Daily CXR   DVT and GI PPX   May require trach and Peg this week    Problem/Recommendation - 2:  ·  Problem: UTI (urinary tract infection).  Recommendation: F.U cultures   Off Antibiotics.     Problem/Recommendation - 3:  ·  Problem: Chest pain.  Recommendation: likely related to Covid-19 infection.     Problem/Recommendation - 4:  ·  Problem: Transaminitis.  Recommendation: monitor LFTs  GI F/U     Problem/Recommendation - 5:  ·  Problem: Pneumothorax.  Recommendation: s/p left sided chest tube  Thoracic sx follow up  Daily  CXR   Management per ICU

## 2021-04-15 NOTE — PROGRESS NOTE ADULT - ASSESSMENT
Pneumonia  Fevers  Leukocytosis - improved    Plan: Continue Meropenem 1g iv q8  prognosis is poor Pneumonia  Fevers  Leukocytosis - improved  COVID - 19 infection    Plan: Continue Meropenem 1g iv q8  prognosis is poor  Time spent - 32 mins.    I agree with above

## 2021-04-15 NOTE — PROGRESS NOTE ADULT - SUBJECTIVE AND OBJECTIVE BOX
INTERVAL HPI/OVERNIGHT EVENTS: ***    PRESSORS: [ ] YES [ ] NO  WHICH:    ANTIBIOTICS:                  DATE STARTED:  ANTIBIOTICS:                  DATE STARTED:  ANTIBIOTICS:                  DATE STARTED:    Antimicrobial:  meropenem  IVPB 1000 milliGRAM(s) IV Intermittent every 8 hours  meropenem  IVPB        Cardiovascular:  midodrine 5 milliGRAM(s) Oral every 8 hours  phenylephrine    Infusion 0.1 MICROgram(s)/kG/Min IV Continuous <Continuous>    Pulmonary:  ALBUTerol    90 MICROgram(s) HFA Inhaler 2 Puff(s) Inhalation every 6 hours    Hematalogic:  aspirin  chewable 81 milliGRAM(s) Oral daily  enoxaparin Injectable 40 milliGRAM(s) SubCutaneous two times a day    Other:  acetaminophen    Suspension .. 650 milliGRAM(s) Oral every 6 hours PRN  artificial  tears Solution 1 Drop(s) Both EYES every 4 hours PRN  ascorbic acid 1000 milliGRAM(s) Oral daily  chlorhexidine 0.12% Liquid 15 milliLiter(s) Oral Mucosa two times a day  chlorhexidine 2% Cloths 1 Application(s) Topical <User Schedule>  fentaNYL   Infusion 4 MICROgram(s)/kG/Hr IV Continuous <Continuous>  ibuprofen  Suspension. 600 milliGRAM(s) Enteral Tube every 6 hours PRN  insulin lispro (ADMELOG) corrective regimen sliding scale   SubCutaneous every 6 hours  LORazepam     Tablet 1 milliGRAM(s) Oral every 4 hours PRN  midazolam Infusion 0.02 mG/kG/Hr IV Continuous <Continuous>  pantoprazole  Injectable 40 milliGRAM(s) IV Push daily  polyethylene glycol 3350 17 Gram(s) Oral daily  senna 2 Tablet(s) Oral at bedtime  sodium chloride 0.9% lock flush 10 milliLiter(s) IV Push every 1 hour PRN    acetaminophen    Suspension .. 650 milliGRAM(s) Oral every 6 hours PRN  ALBUTerol    90 MICROgram(s) HFA Inhaler 2 Puff(s) Inhalation every 6 hours  artificial  tears Solution 1 Drop(s) Both EYES every 4 hours PRN  ascorbic acid 1000 milliGRAM(s) Oral daily  aspirin  chewable 81 milliGRAM(s) Oral daily  chlorhexidine 0.12% Liquid 15 milliLiter(s) Oral Mucosa two times a day  chlorhexidine 2% Cloths 1 Application(s) Topical <User Schedule>  enoxaparin Injectable 40 milliGRAM(s) SubCutaneous two times a day  fentaNYL   Infusion 4 MICROgram(s)/kG/Hr IV Continuous <Continuous>  ibuprofen  Suspension. 600 milliGRAM(s) Enteral Tube every 6 hours PRN  insulin lispro (ADMELOG) corrective regimen sliding scale   SubCutaneous every 6 hours  LORazepam     Tablet 1 milliGRAM(s) Oral every 4 hours PRN  meropenem  IVPB      meropenem  IVPB 1000 milliGRAM(s) IV Intermittent every 8 hours  midazolam Infusion 0.02 mG/kG/Hr IV Continuous <Continuous>  midodrine 5 milliGRAM(s) Oral every 8 hours  pantoprazole  Injectable 40 milliGRAM(s) IV Push daily  phenylephrine    Infusion 0.1 MICROgram(s)/kG/Min IV Continuous <Continuous>  polyethylene glycol 3350 17 Gram(s) Oral daily  senna 2 Tablet(s) Oral at bedtime  sodium chloride 0.9% lock flush 10 milliLiter(s) IV Push every 1 hour PRN    Drug Dosing Weight  Height (cm): 167.6 (14 Mar 2021 12:03)  Weight (kg): 83.869 (14 Mar 2021 12:03)  BMI (kg/m2): 29.9 (14 Mar 2021 12:03)  BSA (m2): 1.93 (14 Mar 2021 12:03)    CENTRAL LINE: [ ] YES [ ] NO  LOCATION:   DATE INSERTED:  REMOVE: [ ] YES [ ] NO  EXPLAIN:    RIVERA: [ ] YES [ ] NO    DATE INSERTED:  REMOVE:  [ ] YES [ ] NO  EXPLAIN:    A-LINE:  [ ] YES [ ] NO  LOCATION:   DATE INSERTED:  REMOVE:  [ ] YES [ ] NO  EXPLAIN:    PMH -reviewed admission note, no change since admission  PAST MEDICAL & SURGICAL HISTORY:  No pertinent past medical history    S/P moody  1990s    S/P appendectomy  1990s        ICU Vital Signs Last 24 Hrs  T(C): 35.8 (15 Apr 2021 05:00), Max: 37.8 (14 Apr 2021 10:15)  T(F): 96.5 (15 Apr 2021 05:00), Max: 100 (14 Apr 2021 10:15)  HR: 108 (15 Apr 2021 07:45) (82 - 137)  BP: 127/64 (15 Apr 2021 07:30) (102/75 - 163/75)  BP(mean): 77 (15 Apr 2021 07:30) (68 - 97)  ABP: 165/70 (14 Apr 2021 17:00) (79/46 - 165/70)  ABP(mean): 278 (14 Apr 2021 17:00) (57 - 278)  RR: 19 (15 Apr 2021 07:45) (0 - 50)  SpO2: 99% (15 Apr 2021 07:45) (89% - 100%)      ABG - ( 15 Apr 2021 03:19 )  pH, Arterial: 7.47  pH, Blood: x     /  pCO2: 58    /  pO2: 144   / HCO3: 42    / Base Excess: 15.6  /  SaO2: 100                   04-14 @ 07:01  -  04-15 @ 07:00  --------------------------------------------------------  IN: 3053.5 mL / OUT: 600 mL / NET: 2453.5 mL        Mode: AC/ CMV (Assist Control/ Continuous Mandatory Ventilation)  RR (machine): 32  TV (machine): 400  FiO2: 70  PEEP: 8  ITime: 1  MAP: 16  PIP: 33      PHYSICAL EXAM:    GENERAL: [ ]NAD, [ ]well-groomed, [ ]well-developed  HEAD:  [ ]Atraumatic, [ ]Normocephalic  EYES: [ ]EOMI, [ ]PERRLA, [ ]conjunctiva and sclera clear  ENMT: [ ]No tonsillar erythema, exudates, or enlargement; [ ]Moist mucous membranes, [ ]Good dentition, [ ]No lesions  NECK: [ ]Supple, normal appearance, [ ]No JVD; [ ]Normal thyroid; [ ]Trachea midline  NERVOUS SYSTEM:  [ ]Alert & Oriented X3, [ ]Good concentration; [ ]Motor Strength 5/5 B/L upper and lower extremities; [ ]DTRs 2+ intact and symmetric  CHEST/LUNG: [ ]No chest deformity; [ ]Normal percussion bilaterally; [ ]No rales, rhonchi, wheezing   HEART: [ ]Regular rate and rhythm; [ ]No murmurs, rubs, or gallops  ABDOMEN: [ ]Soft, Nontender, Nondistended; [ ]Bowel sounds present  EXTREMITIES:  [ ]2+ Peripheral Pulses, [ ]No clubbing, cyanosis, or edema  LYMPH: [ ]No lymphadenopathy noted  SKIN: [ ]No rashes or lesions; [ ]Good capillary refill      LABS:  CBC Full  -  ( 15 Apr 2021 06:08 )  WBC Count : 6.07 K/uL  RBC Count : 2.89 M/uL  Hemoglobin : 9.1 g/dL  Hematocrit : 29.5 %  Platelet Count - Automated : 447 K/uL  Mean Cell Volume : 102.1 fl  Mean Cell Hemoglobin : 31.5 pg  Mean Cell Hemoglobin Concentration : 30.8 gm/dL  Auto Neutrophil # : SEE NOTE K/uL  Auto Lymphocyte # : SEE NOTE K/uL  Auto Monocyte # : SEE NOTE K/uL  Auto Eosinophil # : SEE NOTE K/uL  Auto Basophil # : SEE NOTE K/uL  Auto Neutrophil % : SEE NOTE %  Auto Lymphocyte % : SEE NOTE %  Auto Monocyte % : SEE NOTE %  Auto Eosinophil % : SEE NOTE %  Auto Basophil % : SEE NOTE %    04-15    143  |  102  |  17  ----------------------------<  117<H>  4.2   |  40<H>  |  0.33<L>    Ca    8.2<L>      15 Apr 2021 06:08  Phos  1.9     04-15  Mg     2.2     04-15    TPro  5.6<L>  /  Alb  1.7<L>  /  TBili  0.5  /  DBili  x   /  AST  29  /  ALT  33  /  AlkPhos  94  04-15    PT/INR - ( 14 Apr 2021 03:50 )   PT: 15.3 sec;   INR: 1.30 ratio         PTT - ( 14 Apr 2021 03:50 )  PTT:34.2 sec        RADIOLOGY & ADDITIONAL STUDIES REVIEWED:  ***    [ ]GOALS OF CARE DISCUSSION WITH PATIENT/FAMILY/PROXY:    CRITICAL CARE TIME SPENT: 35 minutes INTERVAL HPI/OVERNIGHT EVENTS: No fevers overnight, Doing better on Veresed and current Vent settings, Electrolytes were replaced , Retaining urine     PRESSORS: [x ] YES [ ] NO  WHICH: phenylephrine     ANTIBIOTICS:                  DATE STARTED:  ANTIBIOTICS:                  DATE STARTED:  ANTIBIOTICS:                  DATE STARTED:    Antimicrobial:  meropenem  IVPB 1000 milliGRAM(s) IV Intermittent every 8 hours  meropenem  IVPB        Cardiovascular:  midodrine 5 milliGRAM(s) Oral every 8 hours  phenylephrine    Infusion 0.1 MICROgram(s)/kG/Min IV Continuous <Continuous>    Pulmonary:  ALBUTerol    90 MICROgram(s) HFA Inhaler 2 Puff(s) Inhalation every 6 hours    Hematalogic:  aspirin  chewable 81 milliGRAM(s) Oral daily  enoxaparin Injectable 40 milliGRAM(s) SubCutaneous two times a day    Other:  acetaminophen    Suspension .. 650 milliGRAM(s) Oral every 6 hours PRN  artificial  tears Solution 1 Drop(s) Both EYES every 4 hours PRN  ascorbic acid 1000 milliGRAM(s) Oral daily  chlorhexidine 0.12% Liquid 15 milliLiter(s) Oral Mucosa two times a day  chlorhexidine 2% Cloths 1 Application(s) Topical <User Schedule>  fentaNYL   Infusion 4 MICROgram(s)/kG/Hr IV Continuous <Continuous>  ibuprofen  Suspension. 600 milliGRAM(s) Enteral Tube every 6 hours PRN  insulin lispro (ADMELOG) corrective regimen sliding scale   SubCutaneous every 6 hours  LORazepam     Tablet 1 milliGRAM(s) Oral every 4 hours PRN  midazolam Infusion 0.02 mG/kG/Hr IV Continuous <Continuous>  pantoprazole  Injectable 40 milliGRAM(s) IV Push daily  polyethylene glycol 3350 17 Gram(s) Oral daily  senna 2 Tablet(s) Oral at bedtime  sodium chloride 0.9% lock flush 10 milliLiter(s) IV Push every 1 hour PRN    acetaminophen    Suspension .. 650 milliGRAM(s) Oral every 6 hours PRN  ALBUTerol    90 MICROgram(s) HFA Inhaler 2 Puff(s) Inhalation every 6 hours  artificial  tears Solution 1 Drop(s) Both EYES every 4 hours PRN  ascorbic acid 1000 milliGRAM(s) Oral daily  aspirin  chewable 81 milliGRAM(s) Oral daily  chlorhexidine 0.12% Liquid 15 milliLiter(s) Oral Mucosa two times a day  chlorhexidine 2% Cloths 1 Application(s) Topical <User Schedule>  enoxaparin Injectable 40 milliGRAM(s) SubCutaneous two times a day  fentaNYL   Infusion 4 MICROgram(s)/kG/Hr IV Continuous <Continuous>  ibuprofen  Suspension. 600 milliGRAM(s) Enteral Tube every 6 hours PRN  insulin lispro (ADMELOG) corrective regimen sliding scale   SubCutaneous every 6 hours  LORazepam     Tablet 1 milliGRAM(s) Oral every 4 hours PRN  meropenem  IVPB      meropenem  IVPB 1000 milliGRAM(s) IV Intermittent every 8 hours  midazolam Infusion 0.02 mG/kG/Hr IV Continuous <Continuous>  midodrine 5 milliGRAM(s) Oral every 8 hours  pantoprazole  Injectable 40 milliGRAM(s) IV Push daily  phenylephrine    Infusion 0.1 MICROgram(s)/kG/Min IV Continuous <Continuous>  polyethylene glycol 3350 17 Gram(s) Oral daily  senna 2 Tablet(s) Oral at bedtime  sodium chloride 0.9% lock flush 10 milliLiter(s) IV Push every 1 hour PRN    Drug Dosing Weight  Height (cm): 167.6 (14 Mar 2021 12:03)  Weight (kg): 83.869 (14 Mar 2021 12:03)  BMI (kg/m2): 29.9 (14 Mar 2021 12:03)  BSA (m2): 1.93 (14 Mar 2021 12:03)    CENTRAL LINE: [x ] YES [ ] NO  LOCATION:  [facility]  DATE INSERTED: 4/13  REMOVE: [ ] YES [ ] NO  EXPLAIN:    RIVERA: [ ] YES [x ] NO    DATE INSERTED:  REMOVE:  [ ] YES [ ] NO  EXPLAIN:    A-LINE:  [ ] YES [x ] NO  LOCATION:   DATE INSERTED:  REMOVE:  [ ] YES [ ] NO  EXPLAIN:    PMH -reviewed admission note, no change since admission  PAST MEDICAL & SURGICAL HISTORY:  No pertinent past medical history    S/P moody  1990s    S/P appendectomy  1990s        ICU Vital Signs Last 24 Hrs  T(C): 35.8 (15 Apr 2021 05:00), Max: 37.8 (14 Apr 2021 10:15)  T(F): 96.5 (15 Apr 2021 05:00), Max: 100 (14 Apr 2021 10:15)  HR: 108 (15 Apr 2021 07:45) (82 - 137)  BP: 127/64 (15 Apr 2021 07:30) (102/75 - 163/75)  BP(mean): 77 (15 Apr 2021 07:30) (68 - 97)  ABP: 165/70 (14 Apr 2021 17:00) (79/46 - 165/70)  ABP(mean): 278 (14 Apr 2021 17:00) (57 - 278)  RR: 19 (15 Apr 2021 07:45) (0 - 50)  SpO2: 99% (15 Apr 2021 07:45) (89% - 100%)      ABG - ( 15 Apr 2021 03:19 )  pH, Arterial: 7.47  pH, Blood: x     /  pCO2: 58    /  pO2: 144   / HCO3: 42    / Base Excess: 15.6  /  SaO2: 100                   04-14 @ 07:01  -  04-15 @ 07:00  --------------------------------------------------------  IN: 3053.5 mL / OUT: 600 mL / NET: 2453.5 mL        Mode: AC/ CMV (Assist Control/ Continuous Mandatory Ventilation)  RR (machine): 32  TV (machine): 400  FiO2: 70  PEEP: 8  ITime: 1  MAP: 16  PIP: 33      PHYSICAL EXAM:    GENERAL: sedated and intubated , diaphoretic  HEAD:  Atraumatic, Normocephalic  EYES:  Dry eyes, swollen and red conjuctivae, Unequal pupils   MOUTH : ETT  NECK: Supple,  No JVD; Normal thyroid; Trachea midline [facility]  NERVOUS SYSTEM:  sedated  CHEST/LUNG: B/L crackles,   HEART: Regular rate and rhythm; No murmurs, rubs, or gallops  ABDOMEN: Soft, Nontender, Nondistended; Bowel sounds present  EXTREMITIES:  b/l upper extremities edema +2,  no edema on lower legs. No clubbing, cyanosis   LYMPH: No lymphadenopathy noted  SKIN: No rashes  Good capillary refill      LABS:  CBC Full  -  ( 15 Apr 2021 06:08 )  WBC Count : 6.07 K/uL  RBC Count : 2.89 M/uL  Hemoglobin : 9.1 g/dL  Hematocrit : 29.5 %  Platelet Count - Automated : 447 K/uL  Mean Cell Volume : 102.1 fl  Mean Cell Hemoglobin : 31.5 pg  Mean Cell Hemoglobin Concentration : 30.8 gm/dL  Auto Neutrophil # : SEE NOTE K/uL  Auto Lymphocyte # : SEE NOTE K/uL  Auto Monocyte # : SEE NOTE K/uL  Auto Eosinophil # : SEE NOTE K/uL  Auto Basophil # : SEE NOTE K/uL  Auto Neutrophil % : SEE NOTE %  Auto Lymphocyte % : SEE NOTE %  Auto Monocyte % : SEE NOTE %  Auto Eosinophil % : SEE NOTE %  Auto Basophil % : SEE NOTE %    04-15    143  |  102  |  17  ----------------------------<  117<H>  4.2   |  40<H>  |  0.33<L>    Ca    8.2<L>      15 Apr 2021 06:08  Phos  1.9     04-15  Mg     2.2     04-15    TPro  5.6<L>  /  Alb  1.7<L>  /  TBili  0.5  /  DBili  x   /  AST  29  /  ALT  33  /  AlkPhos  94  04-15    PT/INR - ( 14 Apr 2021 03:50 )   PT: 15.3 sec;   INR: 1.30 ratio         PTT - ( 14 Apr 2021 03:50 )  PTT:34.2 sec        RADIOLOGY & ADDITIONAL STUDIES REVIEWED:  ***    [ ]GOALS OF CARE DISCUSSION WITH PATIENT/FAMILY/PROXY:    CRITICAL CARE TIME SPENT: 35 minutes INTERVAL HPI/OVERNIGHT EVENTS: No fevers overnight, Doing better on Versed and current Vent settings, Electrolytes were replaced, Retaining urine    PRESSORS: [x ] YES [ ] NO  WHICH: phenylephrine     ANTIBIOTICS:                  DATE STARTED:  ANTIBIOTICS:                  DATE STARTED:  ANTIBIOTICS:                  DATE STARTED:    Antimicrobial:  meropenem  IVPB 1000 milliGRAM(s) IV Intermittent every 8 hours  meropenem  IVPB        Cardiovascular:  midodrine 5 milliGRAM(s) Oral every 8 hours  phenylephrine    Infusion 0.1 MICROgram(s)/kG/Min IV Continuous <Continuous>    Pulmonary:  ALBUTerol    90 MICROgram(s) HFA Inhaler 2 Puff(s) Inhalation every 6 hours    Hematalogic:  aspirin  chewable 81 milliGRAM(s) Oral daily  enoxaparin Injectable 40 milliGRAM(s) SubCutaneous two times a day    Other:  acetaminophen    Suspension .. 650 milliGRAM(s) Oral every 6 hours PRN  artificial  tears Solution 1 Drop(s) Both EYES every 4 hours PRN  ascorbic acid 1000 milliGRAM(s) Oral daily  chlorhexidine 0.12% Liquid 15 milliLiter(s) Oral Mucosa two times a day  chlorhexidine 2% Cloths 1 Application(s) Topical <User Schedule>  fentaNYL   Infusion 4 MICROgram(s)/kG/Hr IV Continuous <Continuous>  ibuprofen  Suspension. 600 milliGRAM(s) Enteral Tube every 6 hours PRN  insulin lispro (ADMELOG) corrective regimen sliding scale   SubCutaneous every 6 hours  LORazepam     Tablet 1 milliGRAM(s) Oral every 4 hours PRN  midazolam Infusion 0.02 mG/kG/Hr IV Continuous <Continuous>  pantoprazole  Injectable 40 milliGRAM(s) IV Push daily  polyethylene glycol 3350 17 Gram(s) Oral daily  senna 2 Tablet(s) Oral at bedtime  sodium chloride 0.9% lock flush 10 milliLiter(s) IV Push every 1 hour PRN    acetaminophen    Suspension .. 650 milliGRAM(s) Oral every 6 hours PRN  ALBUTerol    90 MICROgram(s) HFA Inhaler 2 Puff(s) Inhalation every 6 hours  artificial  tears Solution 1 Drop(s) Both EYES every 4 hours PRN  ascorbic acid 1000 milliGRAM(s) Oral daily  aspirin  chewable 81 milliGRAM(s) Oral daily  chlorhexidine 0.12% Liquid 15 milliLiter(s) Oral Mucosa two times a day  chlorhexidine 2% Cloths 1 Application(s) Topical <User Schedule>  enoxaparin Injectable 40 milliGRAM(s) SubCutaneous two times a day  fentaNYL   Infusion 4 MICROgram(s)/kG/Hr IV Continuous <Continuous>  ibuprofen  Suspension. 600 milliGRAM(s) Enteral Tube every 6 hours PRN  insulin lispro (ADMELOG) corrective regimen sliding scale   SubCutaneous every 6 hours  LORazepam     Tablet 1 milliGRAM(s) Oral every 4 hours PRN  meropenem  IVPB      meropenem  IVPB 1000 milliGRAM(s) IV Intermittent every 8 hours  midazolam Infusion 0.02 mG/kG/Hr IV Continuous <Continuous>  midodrine 5 milliGRAM(s) Oral every 8 hours  pantoprazole  Injectable 40 milliGRAM(s) IV Push daily  phenylephrine    Infusion 0.1 MICROgram(s)/kG/Min IV Continuous <Continuous>  polyethylene glycol 3350 17 Gram(s) Oral daily  senna 2 Tablet(s) Oral at bedtime  sodium chloride 0.9% lock flush 10 milliLiter(s) IV Push every 1 hour PRN    Drug Dosing Weight  Height (cm): 167.6 (14 Mar 2021 12:03)  Weight (kg): 83.869 (14 Mar 2021 12:03)  BMI (kg/m2): 29.9 (14 Mar 2021 12:03)  BSA (m2): 1.93 (14 Mar 2021 12:03)    CENTRAL LINE: [x ] YES [ ] NO  LOCATION:  [facility]  DATE INSERTED: 4/13  REMOVE: [ ] YES [ ] NO  EXPLAIN:    RIVERA: [ ] YES [x ] NO    DATE INSERTED:  REMOVE:  [ ] YES [ ] NO  EXPLAIN:    A-LINE:  [ ] YES [x ] NO  LOCATION:   DATE INSERTED:  REMOVE:  [ ] YES [ ] NO  EXPLAIN:    PMH -reviewed admission note, no change since admission  PAST MEDICAL & SURGICAL HISTORY:  No pertinent past medical history    S/P moody  1990s    S/P appendectomy  1990s        ICU Vital Signs Last 24 Hrs  T(C): 35.8 (15 Apr 2021 05:00), Max: 37.8 (14 Apr 2021 10:15)  T(F): 96.5 (15 Apr 2021 05:00), Max: 100 (14 Apr 2021 10:15)  HR: 108 (15 Apr 2021 07:45) (82 - 137)  BP: 127/64 (15 Apr 2021 07:30) (102/75 - 163/75)  BP(mean): 77 (15 Apr 2021 07:30) (68 - 97)  ABP: 165/70 (14 Apr 2021 17:00) (79/46 - 165/70)  ABP(mean): 278 (14 Apr 2021 17:00) (57 - 278)  RR: 19 (15 Apr 2021 07:45) (0 - 50)  SpO2: 99% (15 Apr 2021 07:45) (89% - 100%)      ABG - ( 15 Apr 2021 03:19 )  pH, Arterial: 7.47  pH, Blood: x     /  pCO2: 58    /  pO2: 144   / HCO3: 42    / Base Excess: 15.6  /  SaO2: 100                   04-14 @ 07:01  -  04-15 @ 07:00  --------------------------------------------------------  IN: 3053.5 mL / OUT: 600 mL / NET: 2453.5 mL        Mode: AC/ CMV (Assist Control/ Continuous Mandatory Ventilation)  RR (machine): 32  TV (machine): 400  FiO2: 70  PEEP: 8  ITime: 1  MAP: 16  PIP: 33      PHYSICAL EXAM:    GENERAL: sedated and intubated , diaphoretic  HEAD:  Atraumatic, Normocephalic  EYES:  Dry eyes, inflamed and red conjuctivae, Unequal pupils   MOUTH : ETT  NECK: Supple,  No JVD; Normal thyroid; Trachea midline [facility]  NERVOUS SYSTEM:  sedated  CHEST/LUNG: B/L crackles,   HEART: Regular rate and rhythm; No murmurs, rubs, or gallops  ABDOMEN: Soft, Nontender, Nondistended; Bowel sounds present  EXTREMITIES:  b/l upper extremities edema +2,  no edema on lower legs. No clubbing, cyanosis   LYMPH: No lymphadenopathy noted  SKIN: No rashes  Good capillary refill      LABS:  CBC Full  -  ( 15 Apr 2021 06:08 )  WBC Count : 6.07 K/uL  RBC Count : 2.89 M/uL  Hemoglobin : 9.1 g/dL  Hematocrit : 29.5 %  Platelet Count - Automated : 447 K/uL  Mean Cell Volume : 102.1 fl  Mean Cell Hemoglobin : 31.5 pg  Mean Cell Hemoglobin Concentration : 30.8 gm/dL  Auto Neutrophil # : SEE NOTE K/uL  Auto Lymphocyte # : SEE NOTE K/uL  Auto Monocyte # : SEE NOTE K/uL  Auto Eosinophil # : SEE NOTE K/uL  Auto Basophil # : SEE NOTE K/uL  Auto Neutrophil % : SEE NOTE %  Auto Lymphocyte % : SEE NOTE %  Auto Monocyte % : SEE NOTE %  Auto Eosinophil % : SEE NOTE %  Auto Basophil % : SEE NOTE %    04-15    143  |  102  |  17  ----------------------------<  117<H>  4.2   |  40<H>  |  0.33<L>    Ca    8.2<L>      15 Apr 2021 06:08  Phos  1.9     04-15  Mg     2.2     04-15    TPro  5.6<L>  /  Alb  1.7<L>  /  TBili  0.5  /  DBili  x   /  AST  29  /  ALT  33  /  AlkPhos  94  04-15    PT/INR - ( 14 Apr 2021 03:50 )   PT: 15.3 sec;   INR: 1.30 ratio         PTT - ( 14 Apr 2021 03:50 )  PTT:34.2 sec        RADIOLOGY & ADDITIONAL STUDIES REVIEWED:  yes    [ ]GOALS OF CARE DISCUSSION WITH PATIENT/FAMILY/PROXY:    CRITICAL CARE TIME SPENT: 35 minutes

## 2021-04-15 NOTE — PROGRESS NOTE ADULT - ASSESSMENT
55M, no PMH, PSH appy and moody 1990s presented 3/14 with x9 days worsening cough, subjective fevers, and SOB, with x2-3 days dysuria and central, non-radiating, constant CP. Admitted to [facility] for acute hypoxic respiratory failure s/t covid pneumonia, transferred to ICU for requiring HFNC. now intubated and sedated since 3/29. Noted pneumothorax/pneumomediastinum, chest tube placed on  by thoracic surgery.     1. Acute hypoxic respiratory failure  2. ARDS 2/2 Covid pneumonia  3. Transaminitis  4. Prediabetes  5. Abnormal TSH    =================== Neuro============================  Alert and oriented x 3 at baseline   intubated and sedated 3/29 on Vent  Will resume Versed to help with vent synchrony with Fentanyl, D/C Precedex for now as patient is not qualifying for SBT/SAT at this point  D/C Ativan IV for now   Off propofol.    ================= Cardiovascular==========================  Hypertension  map above 60 goal  s/p Labetalol 20mg IVP for high BP  s/p Lopressor 5mg IVP x 1 4/6  Septic Shock requiring Pressor support. Will try to wean off with PO midodrine 5mg Q8      TACHYCARDIA:  HR in 80s-90s   continue monitoring     ================- Pulm=================================  Acute hypoxic respiratory failure: secondary to covid pneumonia  ARDS  -was on  HFNr then got intubated 3/29  -PC Vent setting : 30/380/60/8  -AB.31/84/80/42  -D-dimer initially elevated on presentation, Slowly rising again   - Patient on increased FiO2 requirement, will monitor ,   - Thoracic surgery will take for Trach and peg on 4/15 morning provided patient is not requiring amount of pressors or FIO2.    # Bacterial Pneumonia  - New infiltrate on CXR ,likely developing VAP, on Zosyn, s/p 1 dose of Vanco   - switched to Meropenem   - MRSA negative from 3/26  -pulm. dr. Ryan  - ID consult Dr Anand     -Remdesivir was discontinued due to positive antibodies   - c/w supportive care   - trend COVID  markers  - Finished Dexamethasone   -on prednisone taper, Finished    Pneumothorax and pneumomediastinum:  -X-ray chest: tiny left apical pneumothorax  -Thoracic surgery consulted   -Chest tube placed  pigtail to wall suction. Will remove   -X-ray monitoring daily, PTX resolved    ==================ID===================================  Likely bacterial pneumonia at present  -leukocytosis resolved  -Still spiking fevers,  - Continue Meropenem,   - C-procal elevated  -plan as above     ================= Nephro================================  -pitting edema to both lower arms  -Electrolyte imbalance :  Phos 1.7, K 3.4, replaced with K-phos, K riders x 3  -Albumin 1.3, give Albumin 25% q6h x 48hr  - D/C Cerda for now.   -monitor I/Os   -Net negative , Urine output monitoring  -D/C lasix and HCTZ to help with pressor requirement   - Finished Albumin x 2 days on 4/10  -monitor lytes, CMP    =================GI====================================  Transaminitis:   likely secondary to Covid   - and  on presentation   hepatitis panel -ve  -Improved  -continue to monitor LFT    diet:   NGT (3/29) with tube feed  bowel regimen   Last BM 4/10  Diet resumed     ================ Heme==================================  Elevated d-dimer: likely secondary to Covid  -d-dimer 423 on presentation, now wnl  -continue prophylactic Lovenox 40 mg bid, hold before procedure     =================Endocrine===============================  Prediabetes:    -A1c  5.8  -BS controlled  -continue HSS  -monitor FS while on steroids    Abnormal TSH:  -TSH level noted 0.26,   -repeat TSH 0.37 and Free T4 1.82    ================= Skin/Catheters============================  No rashes. Peripheral IV lines.   RIj   RRA 3/29, Remove    - =================Prophylaxis =============================  Lovenox for DVT proph  Protonix for  GI proph    ==================GOC==================================   FULL CODE    updated condition to family by resident   55M, no PMH, PSH appy and moody 1990s presented 3/14 with x9 days worsening cough, subjective fevers, and SOB, with x2-3 days dysuria and central, non-radiating, constant CP. Admitted to [facility] for acute hypoxic respiratory failure s/t covid pneumonia, transferred to ICU for requiring HFNC. now intubated and sedated since 3/29. Noted pneumothorax/pneumomediastinum, chest tube placed on  by thoracic surgery.     1. Acute hypoxic respiratory failure  2. ARDS 2/2 Covid pneumonia  3. Transaminitis  4. Prediabetes  5. Abnormal TSH    =================== Neuro============================  Alert and oriented x 3 at baseline   intubated and sedated 3/29 on Vent  Continue Versed to help with vent synchrony with Fentanyl, D/C Precedex for now as patient is not qualifying for SBT/SAT at this point  D/C Ativan IV for now   Off propofol.  F/U CT head as pupils are unequal   Plan for SAT once pt starts coming down on Oxygen requirement.    ================= Cardiovascular==========================  Hypertension  map above 60 goal  s/p Labetalol 20mg IVP for high BP  s/p Lopressor 5mg IVP x 1 4/6  Septic Shock requiring Pressor support. Will try to wean off with PO midodrine 5mg Q8  Not requiring phenylephrine at present       TACHYCARDIA:  HR in 80s-90s   continue monitoring     ================- Pulm=================================  Acute hypoxic respiratory failure: secondary to Covid pneumonia  ARDS  -was on HFNr then got intubated 3/29  -VC Vent setting : 30/400/60/8  -AB.47/58/144/42, f/u repeat ABG   -D-dimer initially elevated on presentation, Slowly rising again   - Patient on increased FiO2 requirement, will monitor ,   - Thoracic surgery will take for Trach and peg on 4/15 morning provided patient is not requiring amount of pressors or FIO2.    # Bacterial Pneumonia  - stable infiltrate on CXR ,likely developed VAP, on Zosyn, s/p 1 dose of Vanco   - continue Meropenem   - MRSA negative from 3/26  -pulm. dr. Ryan  - ID consult Dr Anand     -Remdesivir was discontinued due to positive antibodies   - c/w supportive care   - trend COVID  markers  - Finished Dexamethasone   -on prednisone taper, Finished    Pneumothorax and pneumomediastinum:  -X-ray chest: tiny left apical pneumothorax  -Thoracic surgery consulted   -Chest tube placed  pigtail to wall suction. Will remove   -X-ray monitoring daily, PTX resolved  - D/C chest tube    ==================ID===================================  Likely bacterial pneumonia at present  -leukocytosis resolved  -Still spiking fevers,  - Continue Meropenem,   - C-procal elevated  -plan as above     ================= Nephro================================  -pitting edema to both lower arms  -Electrolyte imbalance :  Phos 1.7, K 3.4, replaced with K-phos, K riders x 3  -Albumin 1.3, give Albumin 25% q6h x 48hr  - D/C Cerda for now.   -monitor I/Os   -Net negative , Urine output monitoring  -Will start Lasix 40mg BID with Albumin 50gm Q6 for 48 hours to help with urine output and fluid status.  - Earlier Finished Albumin x 2 days on 4/10  -monitor saray CMP    =================GI====================================  Transaminitis:   likely secondary to Covid   - and  on presentation   hepatitis panel -ve  -Improved  -continue to monitor LFT    diet:   NGT (3/29) with tube feed  bowel regimen   Last BM 4/10  Diet resumed     ================ Heme==================================  Elevated d-dimer: likely secondary to Covid  -d-dimer 423 on presentation, now wnl  -continue prophylactic Lovenox 40 mg bid, hold before procedure     =================Endocrine===============================  Prediabetes:    -A1c  5.8  -BS controlled  -continue HSS  -monitor FS while on steroids    Abnormal TSH:  -TSH level noted 0.26,   -repeat TSH 0.37 and Free T4 1.82    ================= Skin/Catheters============================  No rashes. Peripheral IV lines.   RIj   RRA 3/29, Remove    - =================Prophylaxis =============================  Lovenox for DVT proph  Protonix for  GI proph    ==================GOC==================================   FULL CODE    updated condition to family by resident   55M, no PMH, PSH appy and moody 1990s presented 3/14 with x9 days worsening cough, subjective fevers, and SOB, with x2-3 days dysuria and central, non-radiating, constant CP. Admitted to [facility] for acute hypoxic respiratory failure s/t covid pneumonia, transferred to ICU for requiring HFNC. now intubated and sedated since 3/29. Noted pneumothorax/pneumomediastinum, chest tube placed on  by thoracic surgery.     1. Acute hypoxic respiratory failure  2. ARDS 2/2 Covid pneumonia  3. Transaminitis  4. Prediabetes  5. Abnormal TSH    =================== Neuro============================  Alert and oriented x 3 at baseline   intubated and sedated 3/29 on Vent  Continue Versed to help with vent synchrony with Fentanyl, D/C Precedex for now as patient is not qualifying for SBT/SAT at this point  D/C Ativan IV for now   Off propofol.  F/U CT head as pupils are unequal   Plan for SAT once pt starts coming down on Oxygen requirement.    ================= Cardiovascular==========================  Hypertension  map above 60 goal  s/p Labetalol 20mg IVP for high BP  s/p Lopressor 5mg IVP x 1 4/6  Septic Shock requiring Pressor support. Will try to wean off with PO midodrine 5mg Q8  Not requiring phenylephrine at present       TACHYCARDIA:  HR in 80s-90s   continue monitoring     ================- Pulm=================================  Acute hypoxic respiratory failure: secondary to Covid pneumonia  ARDS  -was on HFNr then got intubated 3/29  -VC Vent setting : 30/400/60/8  -AB.47/58/144/42, f/u repeat ABG   -D-dimer initially elevated on presentation, Slowly rising again   - Patient on increased FiO2 requirement, will monitor ,   - Thoracic surgery will take for Trach and peg on 4/15 morning provided patient is not requiring amount of pressors or FIO2.    # Bacterial Pneumonia  - stable infiltrate on CXR ,likely developed VAP, on Zosyn, s/p 1 dose of Vanco   - continue Meropenem   - MRSA negative from 3/26  -pulm. dr. Ryan  - ID consult Dr Anand     -Remdesivir was discontinued due to positive antibodies   - c/w supportive care   - trend COVID  markers  - Finished Dexamethasone   -on prednisone taper, Finished    Pneumothorax and pneumomediastinum:  -X-ray chest: tiny left apical pneumothorax  -Thoracic surgery consulted   -Chest tube placed  pigtail to wall suction. Will remove   -X-ray monitoring daily, PTX resolved  - D/C chest tube    ==================ID===================================  Likely bacterial pneumonia at present  -leukocytosis resolved  -Still spiking fevers,  - Continue Meropenem,   - C-procal elevated  -plan as above     ================= Nephro================================  -pitting edema to both lower arms  -Electrolyte imbalance :  Phos 1.7, K 3.4, replaced with K-phos, K riders x 3  - D/C Cerda for now.   -monitor I/Os   -Net negative , Urine output monitoring  -Will start Lasix 40mg BID with Albumin 25% Q6 for 48 hours to help with urine output and fluid status.  - Earlier Finished Albumin x 2 days on 4/10  -monitor lytes, CMP    =================GI====================================  Transaminitis:   likely secondary to Covid   - and  on presentation   hepatitis panel -ve  -Improved  -continue to monitor LFT    diet:   NGT (3/29) with tube feed  bowel regimen   Last BM 4/10  Diet resumed     ================ Heme==================================  Elevated d-dimer: likely secondary to Covid  -d-dimer 423 on presentation, now wnl  -continue prophylactic Lovenox 40 mg bid, hold before procedure     =================Endocrine===============================  Prediabetes:    -A1c  5.8  -BS controlled  -continue HSS  -monitor FS while on steroids    Abnormal TSH:  -TSH level noted 0.26,   -repeat TSH 0.37 and Free T4 1.82    ================= Skin/Catheters============================  No rashes. Peripheral IV lines.   RIj   RRA 3/29, Remove    - =================Prophylaxis =============================  Lovenox for DVT proph  Protonix for  GI proph    ==================GOC==================================   FULL CODE    updated condition to family by resident   55M, no PMH, PSH appy and moody 1990s presented 3/14 with x9 days worsening cough, subjective fevers, and SOB, with x2-3 days dysuria and central, non-radiating, constant CP. Admitted to [facility] for acute hypoxic respiratory failure s/t covid pneumonia, transferred to ICU for requiring HFNC. now intubated and sedated since 3/29. Noted pneumothorax/pneumomediastinum, chest tube placed on  by thoracic surgery.     1. Acute hypoxic respiratory failure  2. ARDS 2/2 Covid pneumonia  3. Transaminitis  4. Prediabetes  5. Abnormal TSH    =================== Neuro============================  Alert and oriented x 3 at baseline   intubated and sedated 3/29 on Vent  Continue Versed to help with vent synchrony with Fentanyl, D/C Precedex for now as patient is not qualifying for SBT/SAT at this point  off IV ativan  Off propofol.  F/U CT head as pupils are unequal   Plan for SAT once pt starts coming down on Oxygen requirement.    ================= Cardiovascular==========================  Hypertension  map above 60 goal  s/p Labetalol 20mg IVP for high BP  s/p Lopressor 5mg IVP x 1 4/6  Septic Shock requiring Pressor support. Will try to wean off with PO midodrine 5mg Q8  Not requiring phenylephrine at present   patient tachycardiac to 150's , Spiking fever,   Will give IV tylenol       TACHYCARDIA:  HR in 80s-90s   continue monitoring     ================- Pulm=================================  Acute hypoxic respiratory failure: secondary to Covid pneumonia  ARDS  -was on HFNr then got intubated 3/29  -VC Vent setting : 30/400/60/8  -AB.47/58/144/42, f/u repeat ABG   -D-dimer initially elevated on presentation, Slowly rising again   - Patient on increased FiO2 requirement, will monitor ,   - Thoracic surgery will take for Trach and peg once O2 requirements come down     # Bacterial Pneumonia  - stable infiltrate on CXR ,likely developed VAP, on Zosyn, s/p 1 dose of Vanco   - continue Meropenem   - MRSA negative from 3/26  -pulm. dr. Ryan  - ID consult Dr Anand     -Remdesivir was discontinued due to positive antibodies   - c/w supportive care   - trend COVID  markers  - Finished Dexamethasone   -on prednisone taper, Finished    Pneumothorax and pneumomediastinum:  -X-ray chest: tiny left apical pneumothorax  -Thoracic surgery consulted   -Chest tube placed  pigtail to wall suction. Will remove   -X-ray monitoring daily, PTX resolved  - D/C chest tube    ==================ID===================================  Likely bacterial pneumonia at present  -leukocytosis resolved  -Still spiking fevers, Will start on Alternative doses of Motrin and Tylenol for fever control  - Continue Meropenem,   - C-procal elevated  -plan as above     ================= Nephro================================  -pitting edema to both lower arms  -Electrolyte imbalance :  Phos 1.7, K 3.4, replaced with K-phos, K riders x 3  - D/C Cerda for now.   -monitor I/Os   -Net negative , Urine output monitoring  -Will start Lasix 40mg BID with Albumin 25% Q6 for 48 hours to help with urine output and fluid status.  - Earlier Finished Albumin x 2 days on 4/10  -monitor saray, CMP    =================GI====================================  Transaminitis:   likely secondary to Covid   - and  on presentation   hepatitis panel -ve  -Improved  -continue to monitor LFT    diet:   NGT (3/29) with tube feed  bowel regimen   Last BM 4/10  Diet resumed     ================ Heme==================================  Elevated d-dimer: likely secondary to Covid  -d-dimer 423 on presentation, now wnl  -continue prophylactic Lovenox 40 mg bid, hold before procedure     =================Endocrine===============================  Prediabetes:    -A1c  5.8  -BS controlled  -continue HSS  -monitor FS while on steroids    Abnormal TSH:  -TSH level noted 0.26,   -repeat TSH 0.37 and Free T4 1.82    ================= Skin/Catheters============================  No rashes. Peripheral IV lines.   RIj   RRA 3/29, Remove    - =================Prophylaxis =============================  Lovenox for DVT proph  Protonix for  GI proph    ==================GOC==================================   FULL CODE    updated condition to family by resident

## 2021-04-16 LAB
ALBUMIN SERPL ELPH-MCNC: 2.5 G/DL — LOW (ref 3.5–5)
ALP SERPL-CCNC: 72 U/L — SIGNIFICANT CHANGE UP (ref 40–120)
ALT FLD-CCNC: 31 U/L DA — SIGNIFICANT CHANGE UP (ref 10–60)
ANION GAP SERPL CALC-SCNC: 0 MMOL/L — LOW (ref 5–17)
ANION GAP SERPL CALC-SCNC: <-1 MMOL/L — LOW (ref 5–17)
AST SERPL-CCNC: 29 U/L — SIGNIFICANT CHANGE UP (ref 10–40)
BASE EXCESS BLDA CALC-SCNC: 21.5 MMOL/L — HIGH (ref -2–3)
BASE EXCESS BLDA CALC-SCNC: 24.3 MMOL/L — HIGH (ref -2–3)
BASE EXCESS BLDA CALC-SCNC: 24.3 MMOL/L — HIGH (ref -2–3)
BASOPHILS # BLD AUTO: 0.01 K/UL — SIGNIFICANT CHANGE UP (ref 0–0.2)
BASOPHILS NFR BLD AUTO: 0.2 % — SIGNIFICANT CHANGE UP (ref 0–2)
BILIRUB SERPL-MCNC: 0.6 MG/DL — SIGNIFICANT CHANGE UP (ref 0.2–1.2)
BLOOD GAS COMMENTS ARTERIAL: SIGNIFICANT CHANGE UP
BLOOD GAS COMMENTS ARTERIAL: SIGNIFICANT CHANGE UP
BUN SERPL-MCNC: 20 MG/DL — HIGH (ref 7–18)
BUN SERPL-MCNC: 21 MG/DL — HIGH (ref 7–18)
CALCIUM SERPL-MCNC: 8.3 MG/DL — LOW (ref 8.4–10.5)
CALCIUM SERPL-MCNC: 8.5 MG/DL — SIGNIFICANT CHANGE UP (ref 8.4–10.5)
CHLORIDE SERPL-SCNC: 102 MMOL/L — SIGNIFICANT CHANGE UP (ref 96–108)
CHLORIDE SERPL-SCNC: 104 MMOL/L — SIGNIFICANT CHANGE UP (ref 96–108)
CO2 SERPL-SCNC: 45 MMOL/L — CRITICAL HIGH (ref 22–31)
CO2 SERPL-SCNC: >45 MMOL/L — CRITICAL HIGH (ref 22–31)
CREAT SERPL-MCNC: 0.31 MG/DL — LOW (ref 0.5–1.3)
CREAT SERPL-MCNC: 0.42 MG/DL — LOW (ref 0.5–1.3)
EOSINOPHIL # BLD AUTO: 0.15 K/UL — SIGNIFICANT CHANGE UP (ref 0–0.5)
EOSINOPHIL NFR BLD AUTO: 2.4 % — SIGNIFICANT CHANGE UP (ref 0–6)
GLUCOSE BLDC GLUCOMTR-MCNC: 100 MG/DL — HIGH (ref 70–99)
GLUCOSE BLDC GLUCOMTR-MCNC: 103 MG/DL — HIGH (ref 70–99)
GLUCOSE BLDC GLUCOMTR-MCNC: 106 MG/DL — HIGH (ref 70–99)
GLUCOSE BLDC GLUCOMTR-MCNC: 116 MG/DL — HIGH (ref 70–99)
GLUCOSE BLDC GLUCOMTR-MCNC: 125 MG/DL — HIGH (ref 70–99)
GLUCOSE SERPL-MCNC: 128 MG/DL — HIGH (ref 70–99)
GLUCOSE SERPL-MCNC: 99 MG/DL — SIGNIFICANT CHANGE UP (ref 70–99)
GRAM STN FLD: SIGNIFICANT CHANGE UP
HCO3 BLDA-SCNC: 54 MMOL/L — CRITICAL HIGH (ref 21–28)
HCO3 BLDA-SCNC: 54 MMOL/L — CRITICAL HIGH (ref 21–28)
HCO3 BLDA-SCNC: 55 MMOL/L — CRITICAL HIGH (ref 21–28)
HCT VFR BLD CALC: 25.5 % — LOW (ref 39–50)
HCT VFR BLD CALC: 26.7 % — LOW (ref 39–50)
HGB BLD-MCNC: 7.8 G/DL — LOW (ref 13–17)
HGB BLD-MCNC: 8.1 G/DL — LOW (ref 13–17)
HOROWITZ INDEX BLDA+IHG-RTO: 60 — SIGNIFICANT CHANGE UP
HOROWITZ INDEX BLDA+IHG-RTO: 60 — SIGNIFICANT CHANGE UP
IMM GRANULOCYTES NFR BLD AUTO: 8.9 % — HIGH (ref 0–1.5)
LYMPHOCYTES # BLD AUTO: 0.69 K/UL — LOW (ref 1–3.3)
LYMPHOCYTES # BLD AUTO: 10.8 % — LOW (ref 13–44)
MAGNESIUM SERPL-MCNC: 2.1 MG/DL — SIGNIFICANT CHANGE UP (ref 1.6–2.6)
MCHC RBC-ENTMCNC: 30.3 GM/DL — LOW (ref 32–36)
MCHC RBC-ENTMCNC: 30.6 GM/DL — LOW (ref 32–36)
MCHC RBC-ENTMCNC: 31.6 PG — SIGNIFICANT CHANGE UP (ref 27–34)
MCHC RBC-ENTMCNC: 31.8 PG — SIGNIFICANT CHANGE UP (ref 27–34)
MCV RBC AUTO: 103.2 FL — HIGH (ref 80–100)
MCV RBC AUTO: 104.7 FL — HIGH (ref 80–100)
MONOCYTES # BLD AUTO: 0.2 K/UL — SIGNIFICANT CHANGE UP (ref 0–0.9)
MONOCYTES NFR BLD AUTO: 3.1 % — SIGNIFICANT CHANGE UP (ref 2–14)
NEUTROPHILS # BLD AUTO: 4.75 K/UL — SIGNIFICANT CHANGE UP (ref 1.8–7.4)
NEUTROPHILS NFR BLD AUTO: 74.6 % — SIGNIFICANT CHANGE UP (ref 43–77)
NRBC # BLD: 0 /100 WBCS — SIGNIFICANT CHANGE UP (ref 0–0)
NRBC # BLD: 1 /100 WBCS — HIGH (ref 0–0)
PCO2 BLDA: 113 MMHG — CRITICAL HIGH (ref 35–48)
PCO2 BLDA: 86 MMHG — CRITICAL HIGH (ref 35–48)
PCO2 BLDA: 91 MMHG — CRITICAL HIGH (ref 35–48)
PH BLDA: 7.29 — LOW (ref 7.35–7.45)
PH BLDA: 7.39 — SIGNIFICANT CHANGE UP (ref 7.35–7.45)
PH BLDA: 7.41 — SIGNIFICANT CHANGE UP (ref 7.35–7.45)
PHOSPHATE SERPL-MCNC: 2 MG/DL — LOW (ref 2.5–4.5)
PLATELET # BLD AUTO: 403 K/UL — HIGH (ref 150–400)
PLATELET # BLD AUTO: 415 K/UL — HIGH (ref 150–400)
PO2 BLDA: 66 MMHG — LOW (ref 83–108)
PO2 BLDA: 67 MMHG — LOW (ref 83–108)
PO2 BLDA: 71 MMHG — LOW (ref 83–108)
POTASSIUM SERPL-MCNC: 3.7 MMOL/L — SIGNIFICANT CHANGE UP (ref 3.5–5.3)
POTASSIUM SERPL-MCNC: 4.4 MMOL/L — SIGNIFICANT CHANGE UP (ref 3.5–5.3)
POTASSIUM SERPL-SCNC: 3.7 MMOL/L — SIGNIFICANT CHANGE UP (ref 3.5–5.3)
POTASSIUM SERPL-SCNC: 4.4 MMOL/L — SIGNIFICANT CHANGE UP (ref 3.5–5.3)
PROCALCITONIN SERPL-MCNC: 0.52 NG/ML — HIGH (ref 0.02–0.1)
PROT SERPL-MCNC: 5.7 G/DL — LOW (ref 6–8.3)
RBC # BLD: 2.47 M/UL — LOW (ref 4.2–5.8)
RBC # BLD: 2.55 M/UL — LOW (ref 4.2–5.8)
RBC # FLD: 14.6 % — HIGH (ref 10.3–14.5)
RBC # FLD: 14.6 % — HIGH (ref 10.3–14.5)
SAO2 % BLDA: 95 % — SIGNIFICANT CHANGE UP
SAO2 % BLDA: 95 % — SIGNIFICANT CHANGE UP
SAO2 % BLDA: 96 % — SIGNIFICANT CHANGE UP
SODIUM SERPL-SCNC: 147 MMOL/L — HIGH (ref 135–145)
SODIUM SERPL-SCNC: 148 MMOL/L — HIGH (ref 135–145)
SPECIMEN SOURCE: SIGNIFICANT CHANGE UP
WBC # BLD: 6.37 K/UL — SIGNIFICANT CHANGE UP (ref 3.8–10.5)
WBC # BLD: 8.02 K/UL — SIGNIFICANT CHANGE UP (ref 3.8–10.5)
WBC # FLD AUTO: 6.37 K/UL — SIGNIFICANT CHANGE UP (ref 3.8–10.5)
WBC # FLD AUTO: 8.02 K/UL — SIGNIFICANT CHANGE UP (ref 3.8–10.5)

## 2021-04-16 PROCEDURE — 71045 X-RAY EXAM CHEST 1 VIEW: CPT | Mod: 26

## 2021-04-16 PROCEDURE — 70450 CT HEAD/BRAIN W/O DYE: CPT | Mod: 26

## 2021-04-16 RX ORDER — POTASSIUM CHLORIDE 20 MEQ
40 PACKET (EA) ORAL EVERY 4 HOURS
Refills: 0 | Status: COMPLETED | OUTPATIENT
Start: 2021-04-16 | End: 2021-04-16

## 2021-04-16 RX ORDER — HYDROCHLOROTHIAZIDE 25 MG
50 TABLET ORAL DAILY
Refills: 0 | Status: DISCONTINUED | OUTPATIENT
Start: 2021-04-16 | End: 2021-04-19

## 2021-04-16 RX ORDER — POTASSIUM PHOSPHATE, MONOBASIC POTASSIUM PHOSPHATE, DIBASIC 236; 224 MG/ML; MG/ML
15 INJECTION, SOLUTION INTRAVENOUS ONCE
Refills: 0 | Status: COMPLETED | OUTPATIENT
Start: 2021-04-16 | End: 2021-04-16

## 2021-04-16 RX ORDER — ENOXAPARIN SODIUM 100 MG/ML
40 INJECTION SUBCUTANEOUS DAILY
Refills: 0 | Status: DISCONTINUED | OUTPATIENT
Start: 2021-04-16 | End: 2021-04-19

## 2021-04-16 RX ORDER — ACETAZOLAMIDE 250 MG/1
500 TABLET ORAL ONCE
Refills: 0 | Status: COMPLETED | OUTPATIENT
Start: 2021-04-16 | End: 2021-04-16

## 2021-04-16 RX ORDER — FUROSEMIDE 40 MG
40 TABLET ORAL
Refills: 0 | Status: DISCONTINUED | OUTPATIENT
Start: 2021-04-16 | End: 2021-04-18

## 2021-04-16 RX ORDER — FUROSEMIDE 40 MG
40 TABLET ORAL DAILY
Refills: 0 | Status: DISCONTINUED | OUTPATIENT
Start: 2021-04-16 | End: 2021-04-16

## 2021-04-16 RX ORDER — POTASSIUM CHLORIDE 20 MEQ
10 PACKET (EA) ORAL
Refills: 0 | Status: COMPLETED | OUTPATIENT
Start: 2021-04-16 | End: 2021-04-16

## 2021-04-16 RX ADMIN — MIDODRINE HYDROCHLORIDE 5 MILLIGRAM(S): 2.5 TABLET ORAL at 13:42

## 2021-04-16 RX ADMIN — Medication 650 MILLIGRAM(S): at 23:27

## 2021-04-16 RX ADMIN — Medication 50 MILLILITER(S): at 05:07

## 2021-04-16 RX ADMIN — Medication 100 MILLIEQUIVALENT(S): at 01:32

## 2021-04-16 RX ADMIN — MIDAZOLAM HYDROCHLORIDE 1.68 MG/KG/HR: 1 INJECTION, SOLUTION INTRAMUSCULAR; INTRAVENOUS at 23:32

## 2021-04-16 RX ADMIN — Medication 1000 MILLIGRAM(S): at 11:04

## 2021-04-16 RX ADMIN — POTASSIUM PHOSPHATE, MONOBASIC POTASSIUM PHOSPHATE, DIBASIC 62.5 MILLIMOLE(S): 236; 224 INJECTION, SOLUTION INTRAVENOUS at 01:30

## 2021-04-16 RX ADMIN — SENNA PLUS 2 TABLET(S): 8.6 TABLET ORAL at 22:15

## 2021-04-16 RX ADMIN — MIDAZOLAM HYDROCHLORIDE 1.68 MG/KG/HR: 1 INJECTION, SOLUTION INTRAMUSCULAR; INTRAVENOUS at 15:53

## 2021-04-16 RX ADMIN — Medication 40 MILLIEQUIVALENT(S): at 01:33

## 2021-04-16 RX ADMIN — MIDAZOLAM HYDROCHLORIDE 1.68 MG/KG/HR: 1 INJECTION, SOLUTION INTRAMUSCULAR; INTRAVENOUS at 03:42

## 2021-04-16 RX ADMIN — POLYETHYLENE GLYCOL 3350 17 GRAM(S): 17 POWDER, FOR SOLUTION ORAL at 11:04

## 2021-04-16 RX ADMIN — Medication 40 MILLIGRAM(S): at 05:08

## 2021-04-16 RX ADMIN — Medication 600 MILLIGRAM(S): at 00:40

## 2021-04-16 RX ADMIN — ALBUTEROL 2 PUFF(S): 90 AEROSOL, METERED ORAL at 15:58

## 2021-04-16 RX ADMIN — ALBUTEROL 2 PUFF(S): 90 AEROSOL, METERED ORAL at 09:03

## 2021-04-16 RX ADMIN — PANTOPRAZOLE SODIUM 40 MILLIGRAM(S): 20 TABLET, DELAYED RELEASE ORAL at 11:02

## 2021-04-16 RX ADMIN — Medication 650 MILLIGRAM(S): at 04:24

## 2021-04-16 RX ADMIN — Medication 600 MILLIGRAM(S): at 16:17

## 2021-04-16 RX ADMIN — FENTANYL CITRATE 33.5 MICROGRAM(S)/KG/HR: 50 INJECTION INTRAVENOUS at 03:42

## 2021-04-16 RX ADMIN — ENOXAPARIN SODIUM 40 MILLIGRAM(S): 100 INJECTION SUBCUTANEOUS at 13:42

## 2021-04-16 RX ADMIN — Medication 50 MILLILITER(S): at 11:03

## 2021-04-16 RX ADMIN — Medication 50 MILLILITER(S): at 23:30

## 2021-04-16 RX ADMIN — MIDODRINE HYDROCHLORIDE 5 MILLIGRAM(S): 2.5 TABLET ORAL at 22:15

## 2021-04-16 RX ADMIN — Medication 650 MILLIGRAM(S): at 05:24

## 2021-04-16 RX ADMIN — Medication 81 MILLIGRAM(S): at 11:03

## 2021-04-16 RX ADMIN — MEROPENEM 100 MILLIGRAM(S): 1 INJECTION INTRAVENOUS at 05:09

## 2021-04-16 RX ADMIN — MEROPENEM 100 MILLIGRAM(S): 1 INJECTION INTRAVENOUS at 13:42

## 2021-04-16 RX ADMIN — Medication 50 MILLIGRAM(S): at 16:16

## 2021-04-16 RX ADMIN — Medication 600 MILLIGRAM(S): at 01:40

## 2021-04-16 RX ADMIN — Medication 100 MILLIEQUIVALENT(S): at 00:40

## 2021-04-16 RX ADMIN — ALBUTEROL 2 PUFF(S): 90 AEROSOL, METERED ORAL at 21:03

## 2021-04-16 RX ADMIN — MEROPENEM 100 MILLIGRAM(S): 1 INJECTION INTRAVENOUS at 22:15

## 2021-04-16 RX ADMIN — Medication 650 MILLIGRAM(S): at 11:02

## 2021-04-16 RX ADMIN — MIDODRINE HYDROCHLORIDE 5 MILLIGRAM(S): 2.5 TABLET ORAL at 05:09

## 2021-04-16 RX ADMIN — Medication 600 MILLIGRAM(S): at 23:32

## 2021-04-16 RX ADMIN — CHLORHEXIDINE GLUCONATE 15 MILLILITER(S): 213 SOLUTION TOPICAL at 17:04

## 2021-04-16 RX ADMIN — Medication 600 MILLIGRAM(S): at 08:40

## 2021-04-16 RX ADMIN — ALBUTEROL 2 PUFF(S): 90 AEROSOL, METERED ORAL at 03:40

## 2021-04-16 RX ADMIN — FENTANYL CITRATE 33.5 MICROGRAM(S)/KG/HR: 50 INJECTION INTRAVENOUS at 11:03

## 2021-04-16 RX ADMIN — Medication 40 MILLIEQUIVALENT(S): at 00:40

## 2021-04-16 RX ADMIN — FENTANYL CITRATE 33.5 MICROGRAM(S)/KG/HR: 50 INJECTION INTRAVENOUS at 17:43

## 2021-04-16 RX ADMIN — Medication 650 MILLIGRAM(S): at 20:15

## 2021-04-16 RX ADMIN — Medication 50 MILLILITER(S): at 17:04

## 2021-04-16 RX ADMIN — CHLORHEXIDINE GLUCONATE 1 APPLICATION(S): 213 SOLUTION TOPICAL at 05:08

## 2021-04-16 RX ADMIN — Medication 600 MILLIGRAM(S): at 09:08

## 2021-04-16 RX ADMIN — Medication 600 MILLIGRAM(S): at 16:47

## 2021-04-16 RX ADMIN — POTASSIUM PHOSPHATE, MONOBASIC POTASSIUM PHOSPHATE, DIBASIC 62.5 MILLIMOLE(S): 236; 224 INJECTION, SOLUTION INTRAVENOUS at 06:40

## 2021-04-16 RX ADMIN — Medication 650 MILLIGRAM(S): at 11:48

## 2021-04-16 RX ADMIN — Medication 40 MILLIGRAM(S): at 17:04

## 2021-04-16 RX ADMIN — Medication 100 MILLIEQUIVALENT(S): at 02:13

## 2021-04-16 RX ADMIN — CHLORHEXIDINE GLUCONATE 15 MILLILITER(S): 213 SOLUTION TOPICAL at 05:09

## 2021-04-16 RX ADMIN — ENOXAPARIN SODIUM 40 MILLIGRAM(S): 100 INJECTION SUBCUTANEOUS at 05:08

## 2021-04-16 RX ADMIN — ACETAZOLAMIDE 110 MILLIGRAM(S): 250 TABLET ORAL at 17:41

## 2021-04-16 NOTE — CHART NOTE - NSCHARTNOTEFT_GEN_A_CORE
Patient still with high FIO2 and PEEP requirements. Left pigtail removed 4/15 by ICU.   Please reconsult/call thoracic surgery if vent settings improve for trach/PEG planning.

## 2021-04-16 NOTE — PROGRESS NOTE ADULT - SUBJECTIVE AND OBJECTIVE BOX
INTERVAL HPI/OVERNIGHT EVENTS: ***    PRESSORS: [ ] YES [ ] NO  WHICH:    ANTIBIOTICS:                  DATE STARTED:  ANTIBIOTICS:                  DATE STARTED:  ANTIBIOTICS:                  DATE STARTED:    Antimicrobial:  meropenem  IVPB 1000 milliGRAM(s) IV Intermittent every 8 hours  meropenem  IVPB        Cardiovascular:  furosemide   Injectable 40 milliGRAM(s) IV Push two times a day  midodrine 5 milliGRAM(s) Oral every 8 hours  phenylephrine    Infusion 0.2 MICROgram(s)/kG/Min IV Continuous <Continuous>    Pulmonary:  ALBUTerol    90 MICROgram(s) HFA Inhaler 2 Puff(s) Inhalation every 6 hours    Hematalogic:  aspirin  chewable 81 milliGRAM(s) Oral daily  enoxaparin Injectable 40 milliGRAM(s) SubCutaneous two times a day    Other:  acetaminophen    Suspension .. 650 milliGRAM(s) Oral <User Schedule>  albumin human 25% IVPB 50 milliLiter(s) IV Intermittent every 6 hours  artificial  tears Solution 1 Drop(s) Both EYES every 4 hours PRN  ascorbic acid 1000 milliGRAM(s) Oral daily  chlorhexidine 0.12% Liquid 15 milliLiter(s) Oral Mucosa two times a day  chlorhexidine 2% Cloths 1 Application(s) Topical <User Schedule>  fentaNYL   Infusion 4 MICROgram(s)/kG/Hr IV Continuous <Continuous>  ibuprofen  Suspension. 600 milliGRAM(s) Enteral Tube <User Schedule>  insulin lispro (ADMELOG) corrective regimen sliding scale   SubCutaneous every 6 hours  LORazepam     Tablet 1 milliGRAM(s) Oral every 4 hours PRN  midazolam Infusion 0.02 mG/kG/Hr IV Continuous <Continuous>  pantoprazole  Injectable 40 milliGRAM(s) IV Push daily  polyethylene glycol 3350 17 Gram(s) Oral daily  senna 2 Tablet(s) Oral at bedtime  sodium chloride 0.9% lock flush 10 milliLiter(s) IV Push every 1 hour PRN    acetaminophen    Suspension .. 650 milliGRAM(s) Oral <User Schedule>  albumin human 25% IVPB 50 milliLiter(s) IV Intermittent every 6 hours  ALBUTerol    90 MICROgram(s) HFA Inhaler 2 Puff(s) Inhalation every 6 hours  artificial  tears Solution 1 Drop(s) Both EYES every 4 hours PRN  ascorbic acid 1000 milliGRAM(s) Oral daily  aspirin  chewable 81 milliGRAM(s) Oral daily  chlorhexidine 0.12% Liquid 15 milliLiter(s) Oral Mucosa two times a day  chlorhexidine 2% Cloths 1 Application(s) Topical <User Schedule>  enoxaparin Injectable 40 milliGRAM(s) SubCutaneous two times a day  fentaNYL   Infusion 4 MICROgram(s)/kG/Hr IV Continuous <Continuous>  furosemide   Injectable 40 milliGRAM(s) IV Push two times a day  ibuprofen  Suspension. 600 milliGRAM(s) Enteral Tube <User Schedule>  insulin lispro (ADMELOG) corrective regimen sliding scale   SubCutaneous every 6 hours  LORazepam     Tablet 1 milliGRAM(s) Oral every 4 hours PRN  meropenem  IVPB 1000 milliGRAM(s) IV Intermittent every 8 hours  meropenem  IVPB      midazolam Infusion 0.02 mG/kG/Hr IV Continuous <Continuous>  midodrine 5 milliGRAM(s) Oral every 8 hours  pantoprazole  Injectable 40 milliGRAM(s) IV Push daily  phenylephrine    Infusion 0.2 MICROgram(s)/kG/Min IV Continuous <Continuous>  polyethylene glycol 3350 17 Gram(s) Oral daily  senna 2 Tablet(s) Oral at bedtime  sodium chloride 0.9% lock flush 10 milliLiter(s) IV Push every 1 hour PRN    Drug Dosing Weight  Height (cm): 167.6 (14 Mar 2021 12:03)  Weight (kg): 83.869 (14 Mar 2021 12:03)  BMI (kg/m2): 29.9 (14 Mar 2021 12:03)  BSA (m2): 1.93 (14 Mar 2021 12:03)    CENTRAL LINE: [ ] YES [ ] NO  LOCATION:   DATE INSERTED:  REMOVE: [ ] YES [ ] NO  EXPLAIN:    RIVERA: [ ] YES [ ] NO    DATE INSERTED:  REMOVE:  [ ] YES [ ] NO  EXPLAIN:    A-LINE:  [ ] YES [ ] NO  LOCATION:   DATE INSERTED:  REMOVE:  [ ] YES [ ] NO  EXPLAIN:    PMH -reviewed admission note, no change since admission  PAST MEDICAL & SURGICAL HISTORY:  No pertinent past medical history    S/P moody  1990s    S/P appendectomy  1990s        ICU Vital Signs Last 24 Hrs  T(C): 36.9 (16 Apr 2021 04:00), Max: 38.2 (15 Apr 2021 13:00)  T(F): 98.4 (16 Apr 2021 04:00), Max: 100.7 (15 Apr 2021 13:00)  HR: 111 (16 Apr 2021 07:00) (77 - 153)  BP: 106/54 (16 Apr 2021 07:00) (100/43 - 151/86)  BP(mean): 63 (16 Apr 2021 07:00) (55 - 94)  ABP: --  ABP(mean): --  RR: 30 (16 Apr 2021 07:00) (15 - 30)  SpO2: 93% (16 Apr 2021 07:00) (89% - 100%)      ABG - ( 16 Apr 2021 04:21 )  pH, Arterial: 7.41  pH, Blood: x     /  pCO2: 86    /  pO2: 66    / HCO3: 54    / Base Excess: 24.3  /  SaO2: 95                    04-15 @ 07:01  -  04-16 @ 07:00  --------------------------------------------------------  IN: 2836.8 mL / OUT: 4000 mL / NET: -1163.2 mL        Mode: AC/ CMV (Assist Control/ Continuous Mandatory Ventilation)  RR (machine): 30  TV (machine): 400  FiO2: 60  PEEP: 8  ITime: 0.8  MAP: 15  PIP: 39      PHYSICAL EXAM:    GENERAL: [ ]NAD, [ ]well-groomed, [ ]well-developed  HEAD:  [ ]Atraumatic, [ ]Normocephalic  EYES: [ ]EOMI, [ ]PERRLA, [ ]conjunctiva and sclera clear  ENMT: [ ]No tonsillar erythema, exudates, or enlargement; [ ]Moist mucous membranes, [ ]Good dentition, [ ]No lesions  NECK: [ ]Supple, normal appearance, [ ]No JVD; [ ]Normal thyroid; [ ]Trachea midline  NERVOUS SYSTEM:  [ ]Alert & Oriented X3, [ ]Good concentration; [ ]Motor Strength 5/5 B/L upper and lower extremities; [ ]DTRs 2+ intact and symmetric  CHEST/LUNG: [ ]No chest deformity; [ ]Normal percussion bilaterally; [ ]No rales, rhonchi, wheezing   HEART: [ ]Regular rate and rhythm; [ ]No murmurs, rubs, or gallops  ABDOMEN: [ ]Soft, Nontender, Nondistended; [ ]Bowel sounds present  EXTREMITIES:  [ ]2+ Peripheral Pulses, [ ]No clubbing, cyanosis, or edema  LYMPH: [ ]No lymphadenopathy noted  SKIN: [ ]No rashes or lesions; [ ]Good capillary refill      LABS:  CBC Full  -  ( 16 Apr 2021 04:40 )  WBC Count : 6.37 K/uL  RBC Count : 2.47 M/uL  Hemoglobin : 7.8 g/dL  Hematocrit : 25.5 %  Platelet Count - Automated : 403 K/uL  Mean Cell Volume : 103.2 fl  Mean Cell Hemoglobin : 31.6 pg  Mean Cell Hemoglobin Concentration : 30.6 gm/dL  Auto Neutrophil # : 4.75 K/uL  Auto Lymphocyte # : 0.69 K/uL  Auto Monocyte # : 0.20 K/uL  Auto Eosinophil # : 0.15 K/uL  Auto Basophil # : 0.01 K/uL  Auto Neutrophil % : 74.6 %  Auto Lymphocyte % : 10.8 %  Auto Monocyte % : 3.1 %  Auto Eosinophil % : 2.4 %  Auto Basophil % : 0.2 %    04-16    148<H>  |  104  |  21<H>  ----------------------------<  99  4.4   |  >45<HH>  |  0.31<L>    Ca    8.3<L>      16 Apr 2021 04:40  Phos  2.0     04-16  Mg     2.1     04-16    TPro  5.7<L>  /  Alb  2.5<L>  /  TBili  0.6  /  DBili  x   /  AST  29  /  ALT  31  /  AlkPhos  72  04-16            RADIOLOGY & ADDITIONAL STUDIES REVIEWED:  ***    [ ]GOALS OF CARE DISCUSSION WITH PATIENT/FAMILY/PROXY:    CRITICAL CARE TIME SPENT: 35 minutes INTERVAL HPI/OVERNIGHT EVENTS: ***    PRESSORS: [ ] YES [x ] NO  WHICH:    ANTIBIOTICS:                  DATE STARTED:  ANTIBIOTICS:                  DATE STARTED:  ANTIBIOTICS:                  DATE STARTED:    Antimicrobial:  meropenem  IVPB 1000 milliGRAM(s) IV Intermittent every 8 hours  meropenem  IVPB        Cardiovascular:  furosemide   Injectable 40 milliGRAM(s) IV Push two times a day  midodrine 5 milliGRAM(s) Oral every 8 hours  phenylephrine    Infusion 0.2 MICROgram(s)/kG/Min IV Continuous <Continuous>    Pulmonary:  ALBUTerol    90 MICROgram(s) HFA Inhaler 2 Puff(s) Inhalation every 6 hours    Hematalogic:  aspirin  chewable 81 milliGRAM(s) Oral daily  enoxaparin Injectable 40 milliGRAM(s) SubCutaneous two times a day    Other:  acetaminophen    Suspension .. 650 milliGRAM(s) Oral <User Schedule>  albumin human 25% IVPB 50 milliLiter(s) IV Intermittent every 6 hours  artificial  tears Solution 1 Drop(s) Both EYES every 4 hours PRN  ascorbic acid 1000 milliGRAM(s) Oral daily  chlorhexidine 0.12% Liquid 15 milliLiter(s) Oral Mucosa two times a day  chlorhexidine 2% Cloths 1 Application(s) Topical <User Schedule>  fentaNYL   Infusion 4 MICROgram(s)/kG/Hr IV Continuous <Continuous>  ibuprofen  Suspension. 600 milliGRAM(s) Enteral Tube <User Schedule>  insulin lispro (ADMELOG) corrective regimen sliding scale   SubCutaneous every 6 hours  LORazepam     Tablet 1 milliGRAM(s) Oral every 4 hours PRN  midazolam Infusion 0.02 mG/kG/Hr IV Continuous <Continuous>  pantoprazole  Injectable 40 milliGRAM(s) IV Push daily  polyethylene glycol 3350 17 Gram(s) Oral daily  senna 2 Tablet(s) Oral at bedtime  sodium chloride 0.9% lock flush 10 milliLiter(s) IV Push every 1 hour PRN    acetaminophen    Suspension .. 650 milliGRAM(s) Oral <User Schedule>  albumin human 25% IVPB 50 milliLiter(s) IV Intermittent every 6 hours  ALBUTerol    90 MICROgram(s) HFA Inhaler 2 Puff(s) Inhalation every 6 hours  artificial  tears Solution 1 Drop(s) Both EYES every 4 hours PRN  ascorbic acid 1000 milliGRAM(s) Oral daily  aspirin  chewable 81 milliGRAM(s) Oral daily  chlorhexidine 0.12% Liquid 15 milliLiter(s) Oral Mucosa two times a day  chlorhexidine 2% Cloths 1 Application(s) Topical <User Schedule>  enoxaparin Injectable 40 milliGRAM(s) SubCutaneous two times a day  fentaNYL   Infusion 4 MICROgram(s)/kG/Hr IV Continuous <Continuous>  furosemide   Injectable 40 milliGRAM(s) IV Push two times a day  ibuprofen  Suspension. 600 milliGRAM(s) Enteral Tube <User Schedule>  insulin lispro (ADMELOG) corrective regimen sliding scale   SubCutaneous every 6 hours  LORazepam     Tablet 1 milliGRAM(s) Oral every 4 hours PRN  meropenem  IVPB 1000 milliGRAM(s) IV Intermittent every 8 hours  meropenem  IVPB      midazolam Infusion 0.02 mG/kG/Hr IV Continuous <Continuous>  midodrine 5 milliGRAM(s) Oral every 8 hours  pantoprazole  Injectable 40 milliGRAM(s) IV Push daily  phenylephrine    Infusion 0.2 MICROgram(s)/kG/Min IV Continuous <Continuous>  polyethylene glycol 3350 17 Gram(s) Oral daily  senna 2 Tablet(s) Oral at bedtime  sodium chloride 0.9% lock flush 10 milliLiter(s) IV Push every 1 hour PRN    Drug Dosing Weight  Height (cm): 167.6 (14 Mar 2021 12:03)  Weight (kg): 83.869 (14 Mar 2021 12:03)  BMI (kg/m2): 29.9 (14 Mar 2021 12:03)  BSA (m2): 1.93 (14 Mar 2021 12:03)    CENTRAL LINE: [x ] YES [ ] NO  LOCATION: [facility]  DATE INSERTED: 4/13  REMOVE: [ ] YES [ ] NO  EXPLAIN:    RIVERA: [x ] YES [ ] NO    DATE INSERTED:  REMOVE:  [ ] YES [ ] NO  EXPLAIN:    A-LINE:  [ ] YES [ ] NO  LOCATION:   DATE INSERTED:  REMOVE:  [ ] YES [ ] NO  EXPLAIN:    PMH -reviewed admission note, no change since admission  PAST MEDICAL & SURGICAL HISTORY:  No pertinent past medical history    S/P moody  1990s    S/P appendectomy  1990s        ICU Vital Signs Last 24 Hrs  T(C): 36.9 (16 Apr 2021 04:00), Max: 38.2 (15 Apr 2021 13:00)  T(F): 98.4 (16 Apr 2021 04:00), Max: 100.7 (15 Apr 2021 13:00)  HR: 111 (16 Apr 2021 07:00) (77 - 153)  BP: 106/54 (16 Apr 2021 07:00) (100/43 - 151/86)  BP(mean): 63 (16 Apr 2021 07:00) (55 - 94)  ABP: --  ABP(mean): --  RR: 30 (16 Apr 2021 07:00) (15 - 30)  SpO2: 93% (16 Apr 2021 07:00) (89% - 100%)      ABG - ( 16 Apr 2021 04:21 )  pH, Arterial: 7.41  pH, Blood: x     /  pCO2: 86    /  pO2: 66    / HCO3: 54    / Base Excess: 24.3  /  SaO2: 95                    04-15 @ 07:01  -  04-16 @ 07:00  --------------------------------------------------------  IN: 2836.8 mL / OUT: 4000 mL / NET: -1163.2 mL        Mode: AC/ CMV (Assist Control/ Continuous Mandatory Ventilation)  RR (machine): 30  TV (machine): 400  FiO2: 60  PEEP: 8  ITime: 0.8  MAP: 15  PIP: 39      PHYSICAL EXAM:    GENERAL: NAD, on Vent, sedated  HEAD: Normocephalic, atraumatic  EYES:  Dry eyes, conjunctivae/sclera clear, Unequal pupils  MOUTH : ET Tube  NECK: Supple,   [facility], No JVD; Normal thyroid; Trachea midline no lymphadenopathy   NERVOUS SYSTEM:  sedated  CHEST/LUNG: B/L crackles (R>L), equal lung expansion  HEART: Regular rate and rhythm; No murmurs, rubs, or gallops, tachycardia  ABDOMEN: Soft, Nontender, Nondistended; Bowel sounds present  EXTREMITIES:  b/l upper extremities edema +2,  no edema on lower legs. No clubbing, cyanosis   SKIN: No rashes  + capillary refill <2 sec      LABS:  CBC Full  -  ( 16 Apr 2021 04:40 )  WBC Count : 6.37 K/uL  RBC Count : 2.47 M/uL  Hemoglobin : 7.8 g/dL  Hematocrit : 25.5 %  Platelet Count - Automated : 403 K/uL      Mean Cell Volume : 103.2 fl  Mean Cell Hemoglobin : 31.6 pg  Mean Cell Hemoglobin Concentration : 30.6 gm/dL  Auto Neutrophil # : 4.75 K/uL  Auto Lymphocyte # : 0.69 K/uL  Auto Monocyte # : 0.20 K/uL  Auto Eosinophil # : 0.15 K/uL  Auto Basophil # : 0.01 K/uL  Auto Neutrophil % : 74.6 %  Auto Lymphocyte % : 10.8 %  Auto Monocyte % : 3.1 %  Auto Eosinophil % : 2.4 %  Auto Basophil % : 0.2 %    04-16    148<H>  |  104  |  21<H>  ----------------------------<  99  4.4   |  >45<HH>  |  0.31<L>    Ca    8.3<L>      16 Apr 2021 04:40  Phos  2.0     04-16  Mg     2.1     04-16    TPro  5.7<L>  /  Alb  2.5<L>  /  TBili  0.6  /  DBili  x   /  AST  29  /  ALT  31  /  AlkPhos  72  04-16            RADIOLOGY & ADDITIONAL STUDIES REVIEWED:     < from: Xray Chest 1 View- PORTABLE-Routine (Xray Chest 1 View- PORTABLE-Routine in AM.) (04.15.21 @ 07:41) >    EXAM:  XR CHEST PORTABLE ROUTINE 1V                            PROCEDURE DATE:  04/15/2021      < end of copied text >    INTERPRETATION:  CLINICAL STATEMENT: Follow-up chest pain.    TECHNIQUE: AP view of the chest.    COMPARISON: 4/14/2021  FINDINGS/  IMPRESSION:  ET tube, feeding tube, right central line, left chest tube again noted. No gross pneumothorax.    Bilateral airspace opacities present. Comparison difficult due to suboptimal inspiration on prior exam.    Heart size cannot be accurately assessed in this projection.              TYE GALAN MD; Attending Radiologist  This document has been electronically signed. Apr 15 2021  2:43PM      [ ]GOALS OF CARE DISCUSSION WITH PATIENT/FAMILY/PROXY:    CRITICAL CARE TIME SPENT: 35 minutes INTERVAL HPI/OVERNIGHT EVENTS: ***    PRESSORS: [ ] YES [x ] NO  WHICH:    ANTIBIOTICS:                  DATE STARTED:  ANTIBIOTICS:                  DATE STARTED:  ANTIBIOTICS:                  DATE STARTED:    Antimicrobial:  meropenem  IVPB 1000 milliGRAM(s) IV Intermittent every 8 hours  meropenem  IVPB        Cardiovascular:  furosemide   Injectable 40 milliGRAM(s) IV Push two times a day  midodrine 5 milliGRAM(s) Oral every 8 hours  phenylephrine    Infusion 0.2 MICROgram(s)/kG/Min IV Continuous <Continuous>    Pulmonary:  ALBUTerol    90 MICROgram(s) HFA Inhaler 2 Puff(s) Inhalation every 6 hours    Hematalogic:  aspirin  chewable 81 milliGRAM(s) Oral daily  enoxaparin Injectable 40 milliGRAM(s) SubCutaneous two times a day    Other:  acetaminophen    Suspension .. 650 milliGRAM(s) Oral <User Schedule>  albumin human 25% IVPB 50 milliLiter(s) IV Intermittent every 6 hours  artificial  tears Solution 1 Drop(s) Both EYES every 4 hours PRN  ascorbic acid 1000 milliGRAM(s) Oral daily  chlorhexidine 0.12% Liquid 15 milliLiter(s) Oral Mucosa two times a day  chlorhexidine 2% Cloths 1 Application(s) Topical <User Schedule>  fentaNYL   Infusion 4 MICROgram(s)/kG/Hr IV Continuous <Continuous>  ibuprofen  Suspension. 600 milliGRAM(s) Enteral Tube <User Schedule>  insulin lispro (ADMELOG) corrective regimen sliding scale   SubCutaneous every 6 hours  LORazepam     Tablet 1 milliGRAM(s) Oral every 4 hours PRN  midazolam Infusion 0.02 mG/kG/Hr IV Continuous <Continuous>  pantoprazole  Injectable 40 milliGRAM(s) IV Push daily  polyethylene glycol 3350 17 Gram(s) Oral daily  senna 2 Tablet(s) Oral at bedtime  sodium chloride 0.9% lock flush 10 milliLiter(s) IV Push every 1 hour PRN    acetaminophen    Suspension .. 650 milliGRAM(s) Oral <User Schedule>  albumin human 25% IVPB 50 milliLiter(s) IV Intermittent every 6 hours  ALBUTerol    90 MICROgram(s) HFA Inhaler 2 Puff(s) Inhalation every 6 hours  artificial  tears Solution 1 Drop(s) Both EYES every 4 hours PRN  ascorbic acid 1000 milliGRAM(s) Oral daily  aspirin  chewable 81 milliGRAM(s) Oral daily  chlorhexidine 0.12% Liquid 15 milliLiter(s) Oral Mucosa two times a day  chlorhexidine 2% Cloths 1 Application(s) Topical <User Schedule>  enoxaparin Injectable 40 milliGRAM(s) SubCutaneous two times a day  fentaNYL   Infusion 4 MICROgram(s)/kG/Hr IV Continuous <Continuous>  furosemide   Injectable 40 milliGRAM(s) IV Push two times a day  ibuprofen  Suspension. 600 milliGRAM(s) Enteral Tube <User Schedule>  insulin lispro (ADMELOG) corrective regimen sliding scale   SubCutaneous every 6 hours  LORazepam     Tablet 1 milliGRAM(s) Oral every 4 hours PRN  meropenem  IVPB 1000 milliGRAM(s) IV Intermittent every 8 hours  meropenem  IVPB      midazolam Infusion 0.02 mG/kG/Hr IV Continuous <Continuous>  midodrine 5 milliGRAM(s) Oral every 8 hours  pantoprazole  Injectable 40 milliGRAM(s) IV Push daily  phenylephrine    Infusion 0.2 MICROgram(s)/kG/Min IV Continuous <Continuous>  polyethylene glycol 3350 17 Gram(s) Oral daily  senna 2 Tablet(s) Oral at bedtime  sodium chloride 0.9% lock flush 10 milliLiter(s) IV Push every 1 hour PRN    Drug Dosing Weight  Height (cm): 167.6 (14 Mar 2021 12:03)  Weight (kg): 83.869 (14 Mar 2021 12:03)  BMI (kg/m2): 29.9 (14 Mar 2021 12:03)  BSA (m2): 1.93 (14 Mar 2021 12:03)    CENTRAL LINE: [x ] YES [ ] NO  LOCATION: [facility]  DATE INSERTED: 4/13  REMOVE: [ ] YES [ ] NO  EXPLAIN:    RIVERA: [x ] YES [ ] NO    DATE INSERTED:  REMOVE:  [ ] YES [ ] NO  EXPLAIN:    A-LINE:  [ ] YES [ ] NO  LOCATION:   DATE INSERTED:  REMOVE:  [ ] YES [ ] NO  EXPLAIN:    PMH -reviewed admission note, no change since admission  PAST MEDICAL & SURGICAL HISTORY:  No pertinent past medical history    S/P moody  1990s    S/P appendectomy  1990s        ICU Vital Signs Last 24 Hrs  T(C): 36.9 (16 Apr 2021 04:00), Max: 38.2 (15 Apr 2021 13:00)  T(F): 98.4 (16 Apr 2021 04:00), Max: 100.7 (15 Apr 2021 13:00)  HR: 111 (16 Apr 2021 07:00) (77 - 153)  BP: 106/54 (16 Apr 2021 07:00) (100/43 - 151/86)  BP(mean): 63 (16 Apr 2021 07:00) (55 - 94)  ABP: --  ABP(mean): --  RR: 30 (16 Apr 2021 07:00) (15 - 30)  SpO2: 93% (16 Apr 2021 07:00) (89% - 100%)      ABG - ( 16 Apr 2021 04:21 )  pH, Arterial: 7.41  pH, Blood: x     /  pCO2: 86    /  pO2: 66    / HCO3: 54    / Base Excess: 24.3  /  SaO2: 95                    04-15 @ 07:01  -  04-16 @ 07:00  --------------------------------------------------------  IN: 2836.8 mL / OUT: 4000 mL / NET: -1163.2 mL        Mode: AC/ CMV (Assist Control/ Continuous Mandatory Ventilation)  RR (machine): 30  TV (machine): 400  FiO2: 60  PEEP: 8  ITime: 0.8  MAP: 15  PIP: 39    ROS: unable pt sedated on vent    PHYSICAL EXAM:  GENERAL: NAD, on Vent, sedated  HEAD: Normocephalic, atraumatic  EYES:  Dry eyes, conjunctivae/sclera clear, Unequal pupils  MOUTH : ET Tube  NECK: Supple,   [facility], No JVD; Normal thyroid; Trachea midline no lymphadenopathy   NERVOUS SYSTEM:  sedated  CHEST/LUNG: B/L crackles (R>L), equal lung expansion  HEART: Regular rate and rhythm; No murmurs, rubs, or gallops, tachycardia  ABDOMEN: Soft, Nontender, Nondistended; Bowel sounds present  EXTREMITIES:  b/l upper extremities edema +2,  no edema on lower legs. No clubbing, cyanosis   SKIN: No rashes  + capillary refill <2 sec      LABS:  CBC Full  -  ( 16 Apr 2021 04:40 )  WBC Count : 6.37 K/uL  RBC Count : 2.47 M/uL  Hemoglobin : 7.8 g/dL  Hematocrit : 25.5 %  Platelet Count - Automated : 403 K/uL      Mean Cell Volume : 103.2 fl  Mean Cell Hemoglobin : 31.6 pg  Mean Cell Hemoglobin Concentration : 30.6 gm/dL  Auto Neutrophil # : 4.75 K/uL  Auto Lymphocyte # : 0.69 K/uL  Auto Monocyte # : 0.20 K/uL  Auto Eosinophil # : 0.15 K/uL  Auto Basophil # : 0.01 K/uL  Auto Neutrophil % : 74.6 %  Auto Lymphocyte % : 10.8 %  Auto Monocyte % : 3.1 %  Auto Eosinophil % : 2.4 %  Auto Basophil % : 0.2 %    04-16    148<H>  |  104  |  21<H>  ----------------------------<  99  4.4   |  >45<HH>  |  0.31<L>    Ca    8.3<L>      16 Apr 2021 04:40  Phos  2.0     04-16  Mg     2.1     04-16    TPro  5.7<L>  /  Alb  2.5<L>  /  TBili  0.6  /  DBili  x   /  AST  29  /  ALT  31  /  AlkPhos  72  04-16            RADIOLOGY & ADDITIONAL STUDIES REVIEWED:     < from: Xray Chest 1 View- PORTABLE-Routine (Xray Chest 1 View- PORTABLE-Routine in AM.) (04.15.21 @ 07:41) >    EXAM:  XR CHEST PORTABLE ROUTINE 1V                            PROCEDURE DATE:  04/15/2021      < end of copied text >    INTERPRETATION:  CLINICAL STATEMENT: Follow-up chest pain.    TECHNIQUE: AP view of the chest.    COMPARISON: 4/14/2021  FINDINGS/  IMPRESSION:  ET tube, feeding tube, right central line, left chest tube again noted. No gross pneumothorax.    Bilateral airspace opacities present. Comparison difficult due to suboptimal inspiration on prior exam.    Heart size cannot be accurately assessed in this projection.              TYE GALAN MD; Attending Radiologist  This document has been electronically signed. Apr 15 2021  2:43PM      [ ]GOALS OF CARE DISCUSSION WITH PATIENT/FAMILY/PROXY:    CRITICAL CARE TIME SPENT: 35 minutes INTERVAL HPI/OVERNIGHT EVENTS: No significant event was reported overnight     PRESSORS: [ ] YES [x ] NO  WHICH:    ANTIBIOTICS:                  DATE STARTED:  ANTIBIOTICS:                  DATE STARTED:  ANTIBIOTICS:                  DATE STARTED:    Antimicrobial:  meropenem  IVPB 1000 milliGRAM(s) IV Intermittent every 8 hours  meropenem  IVPB        Cardiovascular:  furosemide   Injectable 40 milliGRAM(s) IV Push two times a day  midodrine 5 milliGRAM(s) Oral every 8 hours  phenylephrine    Infusion 0.2 MICROgram(s)/kG/Min IV Continuous <Continuous>    Pulmonary:  ALBUTerol    90 MICROgram(s) HFA Inhaler 2 Puff(s) Inhalation every 6 hours    Hematalogic:  aspirin  chewable 81 milliGRAM(s) Oral daily  enoxaparin Injectable 40 milliGRAM(s) SubCutaneous two times a day    Other:  acetaminophen    Suspension .. 650 milliGRAM(s) Oral <User Schedule>  albumin human 25% IVPB 50 milliLiter(s) IV Intermittent every 6 hours  artificial  tears Solution 1 Drop(s) Both EYES every 4 hours PRN  ascorbic acid 1000 milliGRAM(s) Oral daily  chlorhexidine 0.12% Liquid 15 milliLiter(s) Oral Mucosa two times a day  chlorhexidine 2% Cloths 1 Application(s) Topical <User Schedule>  fentaNYL   Infusion 4 MICROgram(s)/kG/Hr IV Continuous <Continuous>  ibuprofen  Suspension. 600 milliGRAM(s) Enteral Tube <User Schedule>  insulin lispro (ADMELOG) corrective regimen sliding scale   SubCutaneous every 6 hours  LORazepam     Tablet 1 milliGRAM(s) Oral every 4 hours PRN  midazolam Infusion 0.02 mG/kG/Hr IV Continuous <Continuous>  pantoprazole  Injectable 40 milliGRAM(s) IV Push daily  polyethylene glycol 3350 17 Gram(s) Oral daily  senna 2 Tablet(s) Oral at bedtime  sodium chloride 0.9% lock flush 10 milliLiter(s) IV Push every 1 hour PRN    acetaminophen    Suspension .. 650 milliGRAM(s) Oral <User Schedule>  albumin human 25% IVPB 50 milliLiter(s) IV Intermittent every 6 hours  ALBUTerol    90 MICROgram(s) HFA Inhaler 2 Puff(s) Inhalation every 6 hours  artificial  tears Solution 1 Drop(s) Both EYES every 4 hours PRN  ascorbic acid 1000 milliGRAM(s) Oral daily  aspirin  chewable 81 milliGRAM(s) Oral daily  chlorhexidine 0.12% Liquid 15 milliLiter(s) Oral Mucosa two times a day  chlorhexidine 2% Cloths 1 Application(s) Topical <User Schedule>  enoxaparin Injectable 40 milliGRAM(s) SubCutaneous two times a day  fentaNYL   Infusion 4 MICROgram(s)/kG/Hr IV Continuous <Continuous>  furosemide   Injectable 40 milliGRAM(s) IV Push two times a day  ibuprofen  Suspension. 600 milliGRAM(s) Enteral Tube <User Schedule>  insulin lispro (ADMELOG) corrective regimen sliding scale   SubCutaneous every 6 hours  LORazepam     Tablet 1 milliGRAM(s) Oral every 4 hours PRN  meropenem  IVPB 1000 milliGRAM(s) IV Intermittent every 8 hours  meropenem  IVPB      midazolam Infusion 0.02 mG/kG/Hr IV Continuous <Continuous>  midodrine 5 milliGRAM(s) Oral every 8 hours  pantoprazole  Injectable 40 milliGRAM(s) IV Push daily  phenylephrine    Infusion 0.2 MICROgram(s)/kG/Min IV Continuous <Continuous>  polyethylene glycol 3350 17 Gram(s) Oral daily  senna 2 Tablet(s) Oral at bedtime  sodium chloride 0.9% lock flush 10 milliLiter(s) IV Push every 1 hour PRN    Drug Dosing Weight  Height (cm): 167.6 (14 Mar 2021 12:03)  Weight (kg): 83.869 (14 Mar 2021 12:03)  BMI (kg/m2): 29.9 (14 Mar 2021 12:03)  BSA (m2): 1.93 (14 Mar 2021 12:03)    CENTRAL LINE: [x ] YES [ ] NO  LOCATION: [facility]  DATE INSERTED: 4/13  REMOVE: [ ] YES [ ] NO  EXPLAIN:    RIVERA: [ ] YES [x ] NO    DATE INSERTED:  REMOVE:  [ ] YES [ ] NO  EXPLAIN:    A-LINE:  [ ] YES [ ] NO  LOCATION:   DATE INSERTED:  REMOVE:  [ ] YES [ ] NO  EXPLAIN:    PMH -reviewed admission note, no change since admission  PAST MEDICAL & SURGICAL HISTORY:  No pertinent past medical history    S/P moody  1990s    S/P appendectomy  1990s        ICU Vital Signs Last 24 Hrs  T(C): 36.9 (16 Apr 2021 04:00), Max: 38.2 (15 Apr 2021 13:00)  T(F): 98.4 (16 Apr 2021 04:00), Max: 100.7 (15 Apr 2021 13:00)  HR: 111 (16 Apr 2021 07:00) (77 - 153)  BP: 106/54 (16 Apr 2021 07:00) (100/43 - 151/86)  BP(mean): 63 (16 Apr 2021 07:00) (55 - 94)  RR: 30 (16 Apr 2021 07:00) (15 - 30)  SpO2: 93% (16 Apr 2021 07:00) (89% - 100%)      ABG - ( 16 Apr 2021 04:21 )  pH, Arterial: 7.41  pH, Blood: x     /  pCO2: 86    /  pO2: 66    / HCO3: 54    / Base Excess: 24.3  /  SaO2: 95                    04-15 @ 07:01  -  04-16 @ 07:00  --------------------------------------------------------  IN: 2836.8 mL / OUT: 4000 mL / NET: -1163.2 mL        Mode: AC/ CMV (Assist Control/ Continuous Mandatory Ventilation)  RR (machine): 30  TV (machine): 400  FiO2: 60  PEEP: 8  ITime: 0.8  MAP: 15  PIP: 39    ROS: unable pt sedated on vent    PHYSICAL EXAM:  GENERAL: NAD, on Vent, sedated  HEAD: Normocephalic, atraumatic  EYES:  Dry eyes, conjunctivae/sclera clear, Unequal pupils  MOUTH : ET Tube  NECK: Supple,   LIJ, No JVD; Normal thyroid; Trachea midline no lymphadenopathy   NERVOUS SYSTEM:  sedated  CHEST/LUNG: B/L crackles (R>L), equal lung expansion  HEART: Regular rate and rhythm; No murmurs, rubs, or gallops, tachycardia  ABDOMEN: Soft, Nontender, Nondistended; Bowel sounds present  EXTREMITIES:  b/l upper extremities edema +2,  no edema on lower legs. No clubbing, cyanosis   SKIN: No rashes  + capillary refill <2 sec      LABS:  CBC Full  -  ( 16 Apr 2021 04:40 )  WBC Count : 6.37 K/uL  RBC Count : 2.47 M/uL  Hemoglobin : 7.8 g/dL  Hematocrit : 25.5 %  Platelet Count - Automated : 403 K/uL      Mean Cell Volume : 103.2 fl  Mean Cell Hemoglobin : 31.6 pg  Mean Cell Hemoglobin Concentration : 30.6 gm/dL  Auto Neutrophil # : 4.75 K/uL  Auto Lymphocyte # : 0.69 K/uL  Auto Monocyte # : 0.20 K/uL  Auto Eosinophil # : 0.15 K/uL  Auto Basophil # : 0.01 K/uL  Auto Neutrophil % : 74.6 %  Auto Lymphocyte % : 10.8 %  Auto Monocyte % : 3.1 %  Auto Eosinophil % : 2.4 %  Auto Basophil % : 0.2 %    04-16    148<H>  |  104  |  21<H>  ----------------------------<  99  4.4   |  >45<HH>  |  0.31<L>    Ca    8.3<L>      16 Apr 2021 04:40  Phos  2.0     04-16  Mg     2.1     04-16    TPro  5.7<L>  /  Alb  2.5<L>  /  TBili  0.6  /  DBili  x   /  AST  29  /  ALT  31  /  AlkPhos  72  04-16            RADIOLOGY & ADDITIONAL STUDIES REVIEWED:     < from: Xray Chest 1 View- PORTABLE-Routine (Xray Chest 1 View- PORTABLE-Routine in AM.) (04.15.21 @ 07:41) >    EXAM:  XR CHEST PORTABLE ROUTINE 1V                            PROCEDURE DATE:  04/15/2021      < end of copied text >    INTERPRETATION:  CLINICAL STATEMENT: Follow-up chest pain.    TECHNIQUE: AP view of the chest.    COMPARISON: 4/14/2021  FINDINGS/  IMPRESSION:  ET tube, feeding tube, right central line, left chest tube again noted. No gross pneumothorax.    Bilateral airspace opacities present. Comparison difficult due to suboptimal inspiration on prior exam.    Heart size cannot be accurately assessed in this projection.              TYE GALAN MD; Attending Radiologist  This document has been electronically signed. Apr 15 2021  2:43PM      [ ]GOALS OF CARE DISCUSSION WITH PATIENT/FAMILY/PROXY:    CRITICAL CARE TIME SPENT: 35 minutes

## 2021-04-16 NOTE — PROGRESS NOTE ADULT - SUBJECTIVE AND OBJECTIVE BOX
Patient is a 55y old  Male who presents with a chief complaint of SOB (15 Apr 2021 11:56)    pt seen in icu [ x ], reg med floor [   ], bed [ x ], chair at bedside [   ], a+o x3 [  ], sedated [x  ],  nad [x  ]    andrea [ x ], ngt feed [x  ], et tube [ x ], cent line [x  ],        Allergies    No Known Allergies        Vitals    T(F): 98.4 (04-16-21 @ 04:00), Max: 100.7 (04-15-21 @ 13:00)  HR: 116 (04-16-21 @ 06:00) (77 - 153)  BP: 105/53 (04-16-21 @ 06:00) (100/43 - 151/86)  RR: 27 (04-16-21 @ 06:00) (15 - 30)  SpO2: 89% (04-16-21 @ 06:00) (89% - 100%)  Wt(kg): --  CAPILLARY BLOOD GLUCOSE      POCT Blood Glucose.: 116 mg/dL (16 Apr 2021 05:14)      Labs                          7.8    6.37  )-----------( 403      ( 16 Apr 2021 04:40 )             25.5       04-16    148<H>  |  104  |  21<H>  ----------------------------<  99  4.4   |  >45<HH>  |  0.31<L>    Ca    8.3<L>      16 Apr 2021 04:40  Phos  2.0     04-16  Mg     2.1     04-16    TPro  5.7<L>  /  Alb  2.5<L>  /  TBili  0.6  /  DBili  x   /  AST  29  /  ALT  31  /  AlkPhos  72  04-16            .Urine Clean Catch (Midstream)  03-15 @ 00:52   No growth  --  --          Radiology Results      Meds    MEDICATIONS  (STANDING):  acetaminophen    Suspension .. 650 milliGRAM(s) Oral <User Schedule>  albumin human 25% IVPB 50 milliLiter(s) IV Intermittent every 6 hours  ALBUTerol    90 MICROgram(s) HFA Inhaler 2 Puff(s) Inhalation every 6 hours  ascorbic acid 1000 milliGRAM(s) Oral daily  aspirin  chewable 81 milliGRAM(s) Oral daily  chlorhexidine 0.12% Liquid 15 milliLiter(s) Oral Mucosa two times a day  chlorhexidine 2% Cloths 1 Application(s) Topical <User Schedule>  enoxaparin Injectable 40 milliGRAM(s) SubCutaneous two times a day  fentaNYL   Infusion 4 MICROgram(s)/kG/Hr (33.5 mL/Hr) IV Continuous <Continuous>  furosemide   Injectable 40 milliGRAM(s) IV Push two times a day  ibuprofen  Suspension. 600 milliGRAM(s) Enteral Tube <User Schedule>  insulin lispro (ADMELOG) corrective regimen sliding scale   SubCutaneous every 6 hours  meropenem  IVPB 1000 milliGRAM(s) IV Intermittent every 8 hours  meropenem  IVPB      midazolam Infusion 0.02 mG/kG/Hr (1.68 mL/Hr) IV Continuous <Continuous>  midodrine 5 milliGRAM(s) Oral every 8 hours  pantoprazole  Injectable 40 milliGRAM(s) IV Push daily  phenylephrine    Infusion 0.2 MICROgram(s)/kG/Min (3.15 mL/Hr) IV Continuous <Continuous>  polyethylene glycol 3350 17 Gram(s) Oral daily  senna 2 Tablet(s) Oral at bedtime      MEDICATIONS  (PRN):  artificial  tears Solution 1 Drop(s) Both EYES every 4 hours PRN Dry Eyes  LORazepam     Tablet 1 milliGRAM(s) Oral every 4 hours PRN Agitation  sodium chloride 0.9% lock flush 10 milliLiter(s) IV Push every 1 hour PRN Pre/post blood products, medications, blood draw, and to maintain line patency      Physical Exam    Neuro :  no focal deficits  Respiratory: CTA B/L  CV: RRR, S1S2, no murmurs,   Abdominal: Soft, NT, ND +BS,  Extremities: No edema, + peripheral pulses      ASSESSMENT    Hypoxemia 2nd to covid pna   transaminitis  prediabetes  h/o appendectomy  cholecystectomy        PLAN    contact and airborne isolation  d/c remdesevir given covid ab positive noted   completed dexamethasone   started pulse steroids for 3 days - 250mg solumedrol bid now tapered off 4/14/21  cont asa, vit c,    cont albuterol inhaler   pulm f/u  procalcitonin, D-dimer, crp, ldh, ferritin, lactate noted ,    tmx 100  cont tylenol prn,   cont robitussin prn   pt on fentanyl, phenylephrine, midazolam drip  s/p intubation 3/29/21  O2 sat (89% - 100%) mech vent  O2 via mech vent and taper fio2 as tolerated   serial abg's   vent mgmt as per icu  xray 3/19/21 with pneumomediastinum  rept cxr with New trace right apical pneumothorax. New mild left apical pneumothorax. Grossly stable small pneumomediastinum.  Soft tissue emphysema at the neck bases bilaterally. Grossly stable bilateral pulmonary infiltrates noted.   cxr 2/24 with No evidence of pneumothorax can be appreciated on the available image. This may be related to patient positioning. Evidence of pneumomediastinum and subcutaneous emphysema in the lower neck is again noted. There are patchy bibasilar infiltrates and elevated right hemidiaphragm noted.   cxr 3/29/21 with No significant change bilateral infiltrates. There is a small simple left apical pneumothorax. No significant pleural effusion. Bilateral subcutaneous emphysema similar to prior.   pt intubated 6AM 3/29/21,   XRs on 7AM and 5PM with no obvious increase of ptx  cxr 4/4/21 with Improving bilateral airspace disease noted    cxr 4/8/21 with Small left pneumothorax noted.  thoracic surg f/u   s/p L chest tube placement  CXR 4/11/21 with : No interval change compared to one day prior. No pneumothorax noted above.   Daily CXR  Monitor O2 status   pt for possible trach on 4/15/21 if fio2 <50%  id f/u   Continue Meropenem 1g iv q8  f/u blood cx  andrea d/c'd  lispro ss   prognosis poor  cont current meds         Patient is a 55y old  Male who presents with a chief complaint of SOB (15 Apr 2021 11:56)    pt seen in icu [ x ], reg med floor [   ], bed [ x ], chair at bedside [   ], a+o x3 [  ], sedated [x  ],  nad [x  ]    andrea [ x ], ngt feed [x  ], et tube [ x ], cent line [x  ],        Allergies    No Known Allergies        Vitals    T(F): 98.4 (04-16-21 @ 04:00), Max: 100.7 (04-15-21 @ 13:00)  HR: 116 (04-16-21 @ 06:00) (77 - 153)  BP: 105/53 (04-16-21 @ 06:00) (100/43 - 151/86)  RR: 27 (04-16-21 @ 06:00) (15 - 30)  SpO2: 89% (04-16-21 @ 06:00) (89% - 100%)  Wt(kg): --  CAPILLARY BLOOD GLUCOSE      POCT Blood Glucose.: 116 mg/dL (16 Apr 2021 05:14)      Labs                          7.8    6.37  )-----------( 403      ( 16 Apr 2021 04:40 )             25.5       04-16    148<H>  |  104  |  21<H>  ----------------------------<  99  4.4   |  >45<HH>  |  0.31<L>    Ca    8.3<L>      16 Apr 2021 04:40  Phos  2.0     04-16  Mg     2.1     04-16    TPro  5.7<L>  /  Alb  2.5<L>  /  TBili  0.6  /  DBili  x   /  AST  29  /  ALT  31  /  AlkPhos  72  04-16            .Urine Clean Catch (Midstream)  03-15 @ 00:52   No growth  --  --          Radiology Results      Meds    MEDICATIONS  (STANDING):  acetaminophen    Suspension .. 650 milliGRAM(s) Oral <User Schedule>  albumin human 25% IVPB 50 milliLiter(s) IV Intermittent every 6 hours  ALBUTerol    90 MICROgram(s) HFA Inhaler 2 Puff(s) Inhalation every 6 hours  ascorbic acid 1000 milliGRAM(s) Oral daily  aspirin  chewable 81 milliGRAM(s) Oral daily  chlorhexidine 0.12% Liquid 15 milliLiter(s) Oral Mucosa two times a day  chlorhexidine 2% Cloths 1 Application(s) Topical <User Schedule>  enoxaparin Injectable 40 milliGRAM(s) SubCutaneous two times a day  fentaNYL   Infusion 4 MICROgram(s)/kG/Hr (33.5 mL/Hr) IV Continuous <Continuous>  furosemide   Injectable 40 milliGRAM(s) IV Push two times a day  ibuprofen  Suspension. 600 milliGRAM(s) Enteral Tube <User Schedule>  insulin lispro (ADMELOG) corrective regimen sliding scale   SubCutaneous every 6 hours  meropenem  IVPB 1000 milliGRAM(s) IV Intermittent every 8 hours  meropenem  IVPB      midazolam Infusion 0.02 mG/kG/Hr (1.68 mL/Hr) IV Continuous <Continuous>  midodrine 5 milliGRAM(s) Oral every 8 hours  pantoprazole  Injectable 40 milliGRAM(s) IV Push daily  phenylephrine    Infusion 0.2 MICROgram(s)/kG/Min (3.15 mL/Hr) IV Continuous <Continuous>  polyethylene glycol 3350 17 Gram(s) Oral daily  senna 2 Tablet(s) Oral at bedtime      MEDICATIONS  (PRN):  artificial  tears Solution 1 Drop(s) Both EYES every 4 hours PRN Dry Eyes  LORazepam     Tablet 1 milliGRAM(s) Oral every 4 hours PRN Agitation  sodium chloride 0.9% lock flush 10 milliLiter(s) IV Push every 1 hour PRN Pre/post blood products, medications, blood draw, and to maintain line patency      Physical Exam    Neuro :  no focal deficits  Respiratory: CTA B/L  CV: RRR, S1S2, no murmurs,   Abdominal: Soft, NT, ND +BS,  Extremities: No edema, + peripheral pulses      ASSESSMENT    Hypoxemia 2nd to covid pna   transaminitis  prediabetes  h/o appendectomy  cholecystectomy        PLAN    contact and airborne isolation  d/c remdesevir given covid ab positive noted   completed dexamethasone   started pulse steroids for 3 days - 250mg solumedrol bid now tapered off 4/14/21  cont asa, vit c,    cont albuterol inhaler   pulm f/u  procalcitonin, D-dimer, crp, ldh, ferritin, lactate noted ,    tmx 100.7  cont tylenol prn,   cont robitussin prn   pt on fentanyl, phenylephrine, midazolam drip  s/p intubation 3/29/21  O2 sat (89% - 100%) mech vent  O2 via mech vent and taper fio2 as tolerated   serial abg's   vent mgmt as per icu  xray 3/19/21 with pneumomediastinum  rept cxr with New trace right apical pneumothorax. New mild left apical pneumothorax. Grossly stable small pneumomediastinum.  Soft tissue emphysema at the neck bases bilaterally. Grossly stable bilateral pulmonary infiltrates noted.   cxr 2/24 with No evidence of pneumothorax can be appreciated on the available image. This may be related to patient positioning. Evidence of pneumomediastinum and subcutaneous emphysema in the lower neck is again noted. There are patchy bibasilar infiltrates and elevated right hemidiaphragm noted.   cxr 3/29/21 with No significant change bilateral infiltrates. There is a small simple left apical pneumothorax. No significant pleural effusion. Bilateral subcutaneous emphysema similar to prior.   pt intubated 6AM 3/29/21,   XRs on 7AM and 5PM with no obvious increase of ptx  cxr 4/4/21 with Improving bilateral airspace disease noted    cxr 4/8/21 with Small left pneumothorax noted.  thoracic surg f/u   s/p L chest tube placement  CXR 4/11/21 with : No interval change compared to one day prior. No pneumothorax noted above.   Daily CXR  Monitor O2 status   pt for possible trach cext week if fio2 <50%  id f/u   Continue Meropenem 1g iv q8  f/u blood cx  andrea d/c'd  lispro ss   prognosis poor  cont current meds

## 2021-04-16 NOTE — PROGRESS NOTE ADULT - SUBJECTIVE AND OBJECTIVE BOX
ICU visit:  55y Male is under our care for    REVIEW OF SYSTEMS:  [  ] Not able to illicit  General:	  Chest:	  GI:	  :  Skin:	  Musculoskeletal:	  Neuro:	    MEDS:  meropenem  IVPB 1000 milliGRAM(s) IV Intermittent every 8 hours  meropenem  IVPB        ALLERGIES: Allergies    No Known Allergies    Intolerances        VITALS:  ICU Vital Signs Last 24 Hrs  T(C): 37.3 (16 Apr 2021 12:00), Max: 38.3 (16 Apr 2021 11:00)  T(F): 99.1 (16 Apr 2021 12:00), Max: 100.9 (16 Apr 2021 11:00)  HR: 122 (16 Apr 2021 12:00) (77 - 153)  BP: 104/51 (16 Apr 2021 12:00) (93/49 - 145/74)  BP(mean): 63 (16 Apr 2021 12:00) (55 - 90)  ABP: --  ABP(mean): --  RR: 30 (16 Apr 2021 12:00) (19 - 33)  SpO2: 92% (16 Apr 2021 12:00) (89% - 98%)      PHYSICAL EXAM:  HEENT:  Neck:  Respiratory:  Cardiovascular:  Gastrointestinal:  Extremities:  Skin:  Ortho:  Neuro:    LABS/DIAGNOSTIC TESTS:                        7.8    6.37  )-----------( 403      ( 16 Apr 2021 04:40 )             25.5     WBC Count: 6.37 K/uL (04-16 @ 04:40)  WBC Count: 6.07 K/uL (04-15 @ 06:08)  WBC Count: 11.35 K/uL (04-14 @ 03:50)  WBC Count: 8.73 K/uL (04-13 @ 04:25)  WBC Count: 11.09 K/uL (04-12 @ 04:26)    04-16    148<H>  |  104  |  21<H>  ----------------------------<  99  4.4   |  >45<HH>  |  0.31<L>    Ca    8.3<L>      16 Apr 2021 04:40  Phos  2.0     04-16  Mg     2.1     04-16    TPro  5.7<L>  /  Alb  2.5<L>  /  TBili  0.6  /  DBili  x   /  AST  29  /  ALT  31  /  AlkPhos  72  04-16        ABG - ( 16 Apr 2021 04:21 )  pH: 7.41  /  pCO2: 86    /  pO2: 66    / HCO3: 54    / Base Excess: 24.3  /  SaO2: 95                CULTURES:   .Urine Clean Catch (Midstream)  03-15 @ 00:52   No growth  --  --        RADIOLOGY:  no new studies ICU visit:  55y Male is under our care for pneumonia and fevers.  Patient was seen in the ICU still intubated and vent dependent with an FI02 of 60% and an oxygen saturation of 94%.  Patient is currently on one pressor with a blood pressure of 104/51.  As per thoracic surgery, patient is not a candidate for a trach and peg as patient is still requiring high FIO2 and PEEP.   He exhibited a fever of 100.9 at 11AM but WBC count is WNL.      REVIEW OF SYSTEMS:  [ x ] Not able to elicit    MEDS:  meropenem  IVPB 1000 milliGRAM(s) IV Intermittent every 8 hours  meropenem  IVPB        ALLERGIES: Allergies    No Known Allergies    Intolerances        VITALS:  ICU Vital Signs Last 24 Hrs  T(C): 37.3 (16 Apr 2021 12:00), Max: 38.3 (16 Apr 2021 11:00)  T(F): 99.1 (16 Apr 2021 12:00), Max: 100.9 (16 Apr 2021 11:00)  HR: 122 (16 Apr 2021 12:00) (77 - 153)  BP: 104/51 (16 Apr 2021 12:00) (93/49 - 145/74)  BP(mean): 63 (16 Apr 2021 12:00) (55 - 90)  ABP: --  ABP(mean): --  RR: 30 (16 Apr 2021 12:00) (19 - 33)  SpO2: 92% (16 Apr 2021 12:00) (89% - 98%)      PHYSICAL EXAM:  Constitutional: ETT  HEENT: normocephalic with moist oral mucosa  Neck: supple, right IJ, no JVD  Respiratory: lungs clear no rales no rhonchi  Cardiovascular: S1 S2 reg no murmurs  Gastrointestinal: +BS with soft, nondistended abdomen; nontender  : Cerda catheter in place  Extremities: bilateral arm and hand edema + 2  Skin: no rashes  Ortho: no jt swelling  Neuro: AAO x 0    LABS/DIAGNOSTIC TESTS:                        7.8    6.37  )-----------( 403      ( 16 Apr 2021 04:40 )             25.5     WBC Count: 6.37 K/uL (04-16 @ 04:40)  WBC Count: 6.07 K/uL (04-15 @ 06:08)  WBC Count: 11.35 K/uL (04-14 @ 03:50)  WBC Count: 8.73 K/uL (04-13 @ 04:25)  WBC Count: 11.09 K/uL (04-12 @ 04:26)    04-16    148<H>  |  104  |  21<H>  ----------------------------<  99  4.4   |  >45<HH>  |  0.31<L>    Ca    8.3<L>      16 Apr 2021 04:40  Phos  2.0     04-16  Mg     2.1     04-16    TPro  5.7<L>  /  Alb  2.5<L>  /  TBili  0.6  /  DBili  x   /  AST  29  /  ALT  31  /  AlkPhos  72  04-16        ABG - ( 16 Apr 2021 04:21 )  pH: 7.41  /  pCO2: 86    /  pO2: 66    / HCO3: 54    / Base Excess: 24.3  /  SaO2: 95                CULTURES:   .Urine Clean Catch (Midstream)  03-15 @ 00:52   No growth  --  --        RADIOLOGY:  no new studies

## 2021-04-16 NOTE — PROGRESS NOTE ADULT - ASSESSMENT
55M, no PMH, PSH appy and moody 1990s presented 3/14 with x9 days worsening cough, subjective fevers, and SOB, with x2-3 days dysuria and central, non-radiating, constant CP. Admitted to [facility] for acute hypoxic respiratory failure s/t covid pneumonia, transferred to ICU for requiring HFNC. now intubated and sedated since 3/29. Noted pneumothorax/pneumomediastinum, chest tube placed on  by thoracic surgery.     1. Acute hypoxic respiratory failure  2. ARDS 2/2 Covid pneumonia  3. Transaminitis  4. Prediabetes  5. Abnormal TSH    =================== Neuro============================  Alert and oriented x 3 at baseline   intubated and sedated 3/29 on Vent  Continue Versed to help with vent synchrony with Fentanyl, D/C Precedex for now as patient is not qualifying for SBT/SAT at this point  off IV ativan  Off propofol.  F/U CT head as pupils are unequal   Plan for SAT once pt starts coming down on Oxygen requirement.    ================= Cardiovascular==========================  Hypertension  map above 60 goal  s/p Labetalol 20mg IVP for high BP  s/p Lopressor 5mg IVP x 1 4/6  Septic Shock requiring Pressor support. Will try to wean off with PO midodrine 5mg Q8  Not requiring phenylephrine at present   patient tachycardiac to 150's , Spiking fever,   Will give IV tylenol       TACHYCARDIA:  HR in 80s-90s   continue monitoring     ================- Pulm=================================  Acute hypoxic respiratory failure: secondary to Covid pneumonia  ARDS  -was on HFNr then got intubated 3/29  -VC Vent setting : 30/400/60/8  -AB.47/58/144/42, f/u repeat ABG   -D-dimer initially elevated on presentation, Slowly rising again   - Patient on increased FiO2 requirement, will monitor ,   - Thoracic surgery will take for Trach and peg once O2 requirements come down     # Bacterial Pneumonia  - stable infiltrate on CXR ,likely developed VAP, on Zosyn, s/p 1 dose of Vanco   - continue Meropenem   - MRSA negative from 3/26  -pulm. dr. Ryan  - ID consult Dr Anand     -Remdesivir was discontinued due to positive antibodies   - c/w supportive care   - trend COVID  markers  - Finished Dexamethasone   -on prednisone taper, Finished    Pneumothorax and pneumomediastinum:  -X-ray chest: tiny left apical pneumothorax  -Thoracic surgery consulted   -Chest tube placed  pigtail to wall suction. Will remove   -X-ray monitoring daily, PTX resolved  - D/C chest tube    ==================ID===================================  Likely bacterial pneumonia at present  -leukocytosis resolved  -Still spiking fevers, Will start on Alternative doses of Motrin and Tylenol for fever control  - Continue Meropenem,   - C-procal elevated  -plan as above     ================= Nephro================================  -pitting edema to both lower arms  -Electrolyte imbalance :  Phos 1.7, K 3.4, replaced with K-phos, K riders x 3  - D/C Cerda for now.   -monitor I/Os   -Net negative , Urine output monitoring  -Will start Lasix 40mg BID with Albumin 25% Q6 for 48 hours to help with urine output and fluid status.  - Earlier Finished Albumin x 2 days on 4/10  -monitor saray, CMP    =================GI====================================  Transaminitis:   likely secondary to Covid   - and  on presentation   hepatitis panel -ve  -Improved  -continue to monitor LFT    diet:   NGT (3/29) with tube feed  bowel regimen   Last BM 4/10  Diet resumed     ================ Heme==================================  Elevated d-dimer: likely secondary to Covid  -d-dimer 423 on presentation, now wnl  -continue prophylactic Lovenox 40 mg bid, hold before procedure     =================Endocrine===============================  Prediabetes:    -A1c  5.8  -BS controlled  -continue HSS  -monitor FS while on steroids    Abnormal TSH:  -TSH level noted 0.26,   -repeat TSH 0.37 and Free T4 1.82    ================= Skin/Catheters============================  No rashes. Peripheral IV lines.   RIj   RRA 3/29, Remove    - =================Prophylaxis =============================  Lovenox for DVT proph  Protonix for  GI proph    ==================GOC==================================   FULL CODE    updated condition to family by resident   55M, no PMH, PSH appy and moody 1990s presented 3/14 with x9 days worsening cough, subjective fevers, and SOB, with x2-3 days dysuria and central, non-radiating, constant CP. Admitted to [facility] for acute hypoxic respiratory failure s/t covid pneumonia, transferred to ICU for requiring HFNC. now intubated and sedated since 3/29. Noted pneumothorax/pneumomediastinum, chest tube placed on  by thoracic surgery.     1. Acute hypoxic respiratory failure  2. ARDS 2/2 Covid pneumonia  3. Transaminitis  4. Prediabetes  5. Abnormal TSH    =================== Neuro============================  Alert and oriented x 3 at baseline   intubated and sedated 3/29 on Vent  Continue Versed to help with vent synchrony with Fentanyl  D/C'd Precedex as patient is not qualifying for SBT/SAT at this point  off IV ativan  Off propofol.  F/U CT head as pupils are unequal   Plan for SAT once pt starts coming down on Oxygen requirement.    ================= Cardiovascular==========================  Hypertension  map above 60 goal  s/p Labetalol 20mg IVP for high BP  s/p Lopressor 5mg IVP x 1 4/6  Septic Shock requiring Pressor support. Will try to wean off with PO midodrine 5mg Q8  Not requiring phenylephrine at present   patient tachycardiac to 150's , febrile likely from bacterial pna-improving  Will give IV tylenol for fever      TACHYCARDIA: improving  HR in 90s-100s.   continue monitoring     ================- Pulm=================================  Acute hypoxic respiratory failure: secondary to Covid pneumonia  ARDS  -was on HFNr then got intubated 3/29  -VC Vent setting : 30/400/60/8  -AB.41/86/66/24.3  compensating alkalosis  -D-dimer initially elevated on presentation, Slowly rising again   - Patient on increased FiO2 requirement, will monitor ,   - Thoracic surgery will take for Trach and peg once O2 requirements come down     # Bacterial Pneumonia  - stable infiltrate on CXR ,likely developed VAP, on Zosyn, s/p 1 dose of Vanco   - continue Meropenem   - MRSA negative from 3/26  -pulm. dr. Ryan  - ID consult Dr Anand     -Remdesivir was discontinued due to positive antibodies   - c/w supportive care   - trend COVID  markers  - Finished Dexamethasone   -on prednisone taper, Finished    Pneumothorax and pneumomediastinum:  -X-ray chest: tiny left apical pneumothorax  -Thoracic surgery following  -s/p Chest tube placed  pigtail to wall suction.  d/c'd 4/15  -X-ray monitoring daily, PTX resolved      ==================ID===================================  Likely bacterial pneumonia at present  -leukocytosis resolved  -Still spiking fevers, Will start on Alternative doses of Motrin and Tylenol for fever control  - Continue Meropenem (D 4),   - C-procal elevated  -plan as above     ================= Nephro================================  -pitting edema to both lower arms  -Electrolyte imbalance : replaced,  Na 148, Phos 2.0 K 4.4 today, albumin 2.5, bicarb 45  -on Cerda for now.   -monitor I/Os   -Net negative , Urine output monitoring  -s/p Albumin x 2 days on 4/10  -Will start Lasix 40mg BID with Albumin 25% Q6 for 48 hours to help with urine output and fluid status.  -will Add HCTZ 50mg daily   -monitor lytes, CMP  -Add free water via tube    =================GI====================================  Transaminitis:   likely secondary to Covid   - and  on presentation   hepatitis panel -ve  -Improved  -continue to monitor LFT    diet:   NGT (3/29) with tube feed  bowel regimen   Last BM       ================ Heme==================================  Elevated d-dimer: likely secondary to Covid  -d-dimer 423 on admission  -last D-dimer 841<---1313  -continue prophylactic Lovenox 40 mg bid, on hold for given anemia, will resume when CBC stable.      Anemia  Hgb 7.3, no active signs of bleeding  repeat CBC pm  will transfuse  if hgb drops.     =================Endocrine===============================  Prediabetes:    -A1c  5.8  -BS controlled  -continue HSS  -monitor FS while on steroids    Abnormal TSH:  -TSH level noted 0.26,   -repeat TSH 0.37 and Free T4 1.82    ================= Skin/Catheters============================  No rashes. Peripheral IV lines.   RIj   RRA 3/29, Remove    - =================Prophylaxis =============================  Lovenox for DVT proph  Protonix for  GI proph    ==================GOC==================================   FULL CODE    updated condition to family by resident   55M, no PMH, PSH appy and moody 1990s presented 3/14 with x9 days worsening cough, subjective fevers, and SOB, with x2-3 days dysuria and central, non-radiating, constant CP. Admitted to [facility] for acute hypoxic respiratory failure s/t covid pneumonia, transferred to ICU for requiring HFNC. now intubated and sedated since 3/29. Noted pneumothorax/pneumomediastinum, chest tube placed on  by thoracic surgery, d/c'd 4/15 as resolved.      1. Acute hypoxic respiratory failure  2. ARDS 2/2 Covid pneumonia  3. Transaminitis  4. Prediabetes  5. Abnormal TSH    =================== Neuro============================  Alert and oriented x 3 at baseline   intubated and sedated 3/29 on Vent  Continue Versed to help with vent synchrony with Fentanyl  D/C'd Precedex as patient is not qualifying for SBT/SAT at this point  off IV ativan  Off propofol.  F/U CT head as pupils are unequal   Plan for SAT once pt starts coming down on Oxygen requirement.    ================= Cardiovascular==========================  # Hypertension  map above 60 goal  BP soft side  s/p Labetalol 20mg IVP for high BP  s/p Lopressor 5mg IVP x 1 4/6  Septic Shock requiring Pressor support. Will try to wean off with PO midodrine 5mg Q8  Not requiring phenylephrine at present   patient tachycardiac to 150's , febrile likely from bacterial pna -improving  Will give IV tylenol for fever      # TACHYCARDIA: improving  -HR in 90s-100s.   -continue monitoring     ================- Pulm=================================  #Acute hypoxic respiratory failure: secondary to Covid pneumonia  #ARDS  -was on HFNr then got intubated 3/29  -VC Vent setting : 30/400/60/8  -AB.41/86/66/24.3  compensating alkalosis  -D-dimer initially elevated on presentation, Slowly rising again   - Patient on increased FiO2 requirement, will monitor ,   - Thoracic surgery will take for Trach and peg once O2 requirements come down     -Remdesivir was discontinued due to positive antibodies   - c/w supportive care   - trend COVID  markers  - Finished Dexamethasone   -on prednisone taper, Finished      # Bacterial Pneumonia  - stable infiltrate on CXR ,likely developed VAP, on Zosyn, s/p 1 dose of Vanco   - continue Meropenem   - MRSA negative from 3/26  - f/u sputum Cx  -pulm. dr. Ryan  - ID consult Dr Anand       #Pneumothorax and pneumomediastinum: resolved  -X-ray chest: tiny left apical pneumothorax  -Thoracic surgery following  -s/p Chest tube placed  pigtail to wall suction.  d/c'd 4/15  -X-ray monitoring daily, PTX resolved      ==================ID===================================  Likely bacterial pneumonia at present  -leukocytosis resolved  -Still spiking fevers,  started on Alternative doses of Motrin and Tylenol for fever control  - Continue Meropenem (D 4),   - C-procal elevated  -plan as above     ================= Nephro================================  -pitting edema to both lower arms  -Electrolyte imbalance : replaced,  Na 148, Phos 2.0 K 4.4 today, albumin 2.5, bicarb 45  -on Cerda for now.   -monitor I/Os   -Net negative , Urine output monitoring  -s/p Albumin x 2 days on 4/10  -Will start Lasix 40mg BID with Albumin 25% Q6 for 48 hours to help with urine output and fluid status.  -will Add HCTZ 50mg daily   -monitor lytes, CMP  -Add free water via tube    =================GI====================================  Transaminitis:   likely secondary to Covid   - and  on presentation   hepatitis panel -ve  -Improved  -continue to monitor LFT    diet:   NGT (3/29) with tube feed  bowel regimen   Last BM       ================ Heme==================================  Elevated d-dimer: likely secondary to Covid  -d-dimer 423 on admission  -last D-dimer 841<---1313  -continue prophylactic Lovenox 40 mg bid, on hold for given anemia, will resume when CBC stable.      Anemia  Hgb 7.3, no active signs of bleeding  repeat CBC pm  f/u iron panel, FOBT  will transfuse  if hgb drops.     =================Endocrine===============================  Prediabetes:    -A1c  5.8  -BS controlled  -continue HSS  -monitor FS while on steroids    Abnormal TSH:  -TSH level noted 0.26,   -repeat TSH 0.37 and Free T4 1.82    ================= Skin/Catheters============================  No rashes. Peripheral IV lines.   RIj   RRA 3/29, Remove    - =================Prophylaxis =============================  Lovenox for DVT proph : on hold for now for anemia, may resume when CBC improving  Protonix for  GI proph    ==================GOC==================================   FULL CODE    updated condition to family by resident   55M, no PMH, PSH appy and moody 1990s presented 3/14 with x9 days worsening cough, subjective fevers, and SOB, with x2-3 days dysuria and central, non-radiating, constant CP. Admitted to [facility] for acute hypoxic respiratory failure s/t covid pneumonia, transferred to ICU for requiring HFNC. now intubated and sedated since 3/29. Noted pneumothorax/pneumomediastinum, chest tube placed on  by thoracic surgery, d/c'd 4/15 as resolved.  started on Meropenem for superimposed bacterial pneumonia, CT head pending for unequal pupil reactions.     1. Acute hypoxic respiratory failure  2. ARDS 2/2 Covid pneumonia  3. Transaminitis  4. Prediabetes  5. Abnormal TSH    =================== Neuro============================  Alert and oriented x 3 at baseline   intubated and sedated 3/29 on Vent  Continue Versed to help with vent synchrony with Fentanyl  D/C'd Precedex as patient is not qualifying for SBT/SAT at this point  off IV ativan  Off propofol.  F/U CT head as pupils are unequal   Plan for SAT once pt starts coming down on Oxygen requirement.    ================= Cardiovascular==========================  # Hypertension  map above 60 goal  BP soft side  s/p Labetalol 20mg IVP for high BP  s/p Lopressor 5mg IVP x 1 4/6  Septic Shock requiring Pressor support. Will try to wean off with PO midodrine 5mg Q8  Not requiring phenylephrine at present   patient tachycardiac to 150's , febrile likely from bacterial pna -improving  Will give IV tylenol for fever      # TACHYCARDIA: improving  -HR in 90s-100s.   -continue monitoring     ================- Pulm=================================  #Acute hypoxic respiratory failure: secondary to Covid pneumonia  #ARDS  -was on HFNr then got intubated 3/29  -VC Vent setting : 30/400/60/8  -AB.41/86/66/24.3  compensating alkalosis  -D-dimer initially elevated on presentation, Slowly rising again   - Patient on increased FiO2 requirement, will monitor ,   - Thoracic surgery will take for Trach and peg once O2 requirements come down     -Remdesivir was discontinued due to positive antibodies   - c/w supportive care   - trend COVID  markers  - Finished Dexamethasone   -on prednisone taper, Finished      # Bacterial Pneumonia  - stable infiltrate on CXR ,likely developed VAP, on Zosyn, s/p 1 dose of Vanco   - continue Meropenem   - MRSA negative from 3/26  - f/u sputum Cx  -pulm. dr. Ryan  - ID consult Dr Anand       #Pneumothorax and pneumomediastinum: resolved  -X-ray chest: tiny left apical pneumothorax  -Thoracic surgery following  -s/p Chest tube placed  pigtail to wall suction.  d/c'd 4/15  -X-ray monitoring daily, PTX resolved      ==================ID===================================  Likely bacterial pneumonia at present  -leukocytosis resolved  -Still spiking fevers,  started on Alternative doses of Motrin and Tylenol for fever control  - Continue Meropenem (D 4),   - C-procal elevated  -plan as above     ================= Nephro================================  -pitting edema to both lower arms  -Electrolyte imbalance : replaced,  Na 148, Phos 2.0 K 4.4 today, albumin 2.5, bicarb 45  -on Cerda for now.   -monitor I/Os   -Net negative , Urine output monitoring  -s/p Albumin x 2 days on 4/10  -Will start Lasix 40mg BID with Albumin 25% Q6 for 48 hours to help with urine output and fluid status.  -will Add HCTZ 50mg daily   -monitor lytes, CMP  -Add free water via tube    =================GI====================================  Transaminitis:   likely secondary to Covid   - and  on presentation   hepatitis panel -ve  -Improved  -continue to monitor LFT    diet:   NGT (3/29) with tube feed  bowel regimen   Last BM       ================ Heme==================================  Elevated d-dimer: likely secondary to Covid  -d-dimer 423 on admission  -last D-dimer 841<---1313  -continue prophylactic Lovenox 40 mg bid, on hold for given anemia, will resume when CBC stable.      Anemia  Hgb 7.3, no active signs of bleeding  repeat CBC pm  f/u iron panel, FOBT  will transfuse  if hgb drops.     =================Endocrine===============================  Prediabetes:    -A1c  5.8  -BS controlled  -continue HSS  -monitor FS while on steroids    Abnormal TSH:  -TSH level noted 0.26,   -repeat TSH 0.37 and Free T4 1.82    ================= Skin/Catheters============================  No rashes. Peripheral IV lines.   RIj   RRA 3/29, Remove    - =================Prophylaxis =============================  Lovenox for DVT proph : on hold for now for anemia, may resume when CBC improving  Protonix for  GI proph    ==================GOC==================================   FULL CODE    updated condition to family by resident   55M, no PMH, PSH appy and moody 1990s presented 3/14 with x9 days worsening cough, subjective fevers, and SOB, with x2-3 days dysuria and central, non-radiating, constant CP. Admitted to [facility] for acute hypoxic respiratory failure s/t covid pneumonia, transferred to ICU for requiring HFNC. now intubated and sedated since 3/29. Noted pneumothorax/pneumomediastinum, chest tube placed on  by thoracic surgery, d/c'd 4/15 as resolved.  started on Meropenem for superimposed bacterial pneumonia, CT head pending for unequal pupil reactions.     1. Acute hypoxic respiratory failure  2. ARDS 2/2 Covid pneumonia  3. Transaminitis  4. Prediabetes  5. Abnormal TSH    =================== Neuro============================  Alert and oriented x 3 at baseline   intubated and sedated 3/29 on Vent  Continue Versed to help with vent synchrony with Fentanyl  D/C'd Precedex as patient is not qualifying for SBT/SAT at this point  off IV ativan  Off propofol.  F/U CT head as pupils are unequal   Plan for SAT once pt starts coming down on Oxygen requirement.    ================= Cardiovascular==========================  # Hypertension  map above 60 goal  BP soft side  s/p Labetalol 20mg IVP for high BP  s/p Lopressor 5mg IVP x 1 4/6  Septic Shock requiring Pressor support. Will try to wean off with PO midodrine 5mg Q8  Not requiring phenylephrine at present   patient tachycardiac to 150's , febrile likely from bacterial pna -improving  Will give IV tylenol for fever      # TACHYCARDIA: improving  -HR in 90s-100s.   -continue monitoring     ================- Pulm=================================  #Acute hypoxic respiratory failure: secondary to Covid pneumonia  #ARDS  -was on HFNr then got intubated 3/29  -VC Vent setting : 30/400/60/8  -AB.41/86/66/24.3  compensating alkalosis  -D-dimer initially elevated on presentation, Slowly rising again   - Patient on increased FiO2 requirement, will monitor ,   - Thoracic surgery will take for Trach and peg once O2 requirements come down     -Remdesivir was discontinued due to positive antibodies   - c/w supportive care   - trend COVID  markers  - Finished Dexamethasone   - prednisone taper, Finished      # Bacterial Pneumonia  - stable infiltrate on CXR ,likely developed VAP, on Zosyn, s/p 1 dose of Vanco   - continue Meropenem , f/u ID for further recs  - MRSA negative from 3/26  - f/u sputum Cx 4/15  -pulm. dr. Ryan  - ID consult Dr Anand       #Pneumothorax and pneumomediastinum: resolved  -X-ray chest: tiny left apical pneumothorax  -Thoracic surgery following  -s/p Chest tube placed  pigtail to wall suction.  d/c'd 4/15  -X-ray monitoring daily, PTX resolved      ==================ID===================================  Likely bacterial pneumonia at present  -leukocytosis resolved  -Still spiking fevers,  started on Alternative doses of Motrin and Tylenol for fever control  - Continue Meropenem (D 4),   - C-procal elevated  -plan as above     ================= Nephro================================  -pitting edema to both lower arms  -Electrolyte imbalance : replaced,  Na 148, Phos 2.0 K 4.4 today, albumin 2.5, bicarb 45  -on Cerda for now.   -monitor I/Os   -Net negative , Urine output monitoring  -s/p Albumin x 2 days on 4/10  -On Lasix 40mg BID with Albumin 25% Q6 for 48 hours to help with urine output and fluid status.( 4/15)  -will Add HCTZ 50mg daily   -monitor lytes, CMP  -Add free water via tube    =================GI====================================  Transaminitis:   likely secondary to Covid   - and  on presentation   hepatitis panel -ve  -Improved  -continue to monitor LFT    diet:   NGT (3/29) with tube feed  bowel regimen   Last BM       ================ Heme==================================  Elevated d-dimer: likely secondary to Covid  -d-dimer 423 on admission  -last D-dimer 841<---1313  -continue prophylactic Lovenox 40 mg daily    Anemia  Hgb 7.3, no active signs of bleeding  repeat CBC pm  f/u iron panel, FOBT  will transfuse  if hgb drops.     =================Endocrine===============================  Prediabetes:    -A1c  5.8  -BS controlled  -continue HSS  -monitor FS while on steroids    Abnormal TSH:  -TSH level noted 0.26,   -repeat TSH 0.37 and Free T4 1.82    ================= Skin/Catheters============================  No rashes. Peripheral IV lines.   RIj   RRA 3/29, Removed    - =================Prophylaxis =============================  Lovenox for DVT proph : on hold for now for anemia, may resume when CBC improving  Protonix for  GI proph    ==================GOC==================================   FULL CODE    updated condition to family by resident

## 2021-04-16 NOTE — PROGRESS NOTE ADULT - ASSESSMENT
Pneumonia  Fevers  Leukocytosis - improved  COVID - 19 infection    Plan: Continue Meropenem 1g iv q8  prognosis is poor  Time spent - 34 mins.       Pneumonia  Fevers  Leukocytosis - improved  COVID - 19 infection    Plan: Continue Meropenem 1g iv q8  prognosis is poor  Time spent - 34 mins.    I agree with above

## 2021-04-16 NOTE — PROGRESS NOTE ADULT - SUBJECTIVE AND OBJECTIVE BOX
Patient is a 55y old  Male who presents with a chief complaint of SOB (16 Apr 2021 07:55)  Patient is sedated, intubated on mechanical ventilation. FIO2 is 60% with a sat of 96%. Awaiting trach and peg    INTERVAL HPI/OVERNIGHT EVENTS:      VITAL SIGNS:  T(F): 98.6 (04-16-21 @ 08:00)  HR: 123 (04-16-21 @ 10:00)  BP: 132/61 (04-16-21 @ 10:00)  RR: 32 (04-16-21 @ 10:00)  SpO2: 89% (04-16-21 @ 10:00)  Wt(kg): --  I&O's Detail    15 Apr 2021 07:01  -  16 Apr 2021 07:00  --------------------------------------------------------  IN:    Albumin 25%  -  50 mL: 100 mL    Enteral Tube Flush: 460 mL    FentaNYL: 504 mL    Glucerna 1.5: 525 mL    IV PiggyBack: 562.5 mL    IV PiggyBack: 300 mL    IV PiggyBack: 150 mL    IV PiggyBack: 100 mL    Midazolam: 181.5 mL  Total IN: 2883 mL    OUT:    Incontinent per Condom Catheter (mL): 4000 mL    Phenylephrine: 0 mL    Phenylephrine: 0 mL  Total OUT: 4000 mL    Total NET: -1117 mL      16 Apr 2021 07:01  -  16 Apr 2021 11:26  --------------------------------------------------------  IN:    FentaNYL: 100.8 mL    Glucerna 1.5: 75 mL    IV PiggyBack: 187.5 mL    Midazolam: 37.8 mL  Total IN: 401.1 mL    OUT:    Incontinent per Condom Catheter (mL): 800 mL    Phenylephrine: 0 mL  Total OUT: 800 mL    Total NET: -398.9 mL        Mode: AC/ CMV (Assist Control/ Continuous Mandatory Ventilation)  RR (machine): 30  TV (machine): 400  FiO2: 60  PEEP: 8  ITime: 0.8  MAP: 17  PIP: 36        REVIEW OF SYSTEMS:    CONSTITUTIONAL:  No fevers, chills, sweats    HEENT:  Eyes:  No diplopia or blurred vision. ENT:  No earache, sore throat or runny nose.    CARDIOVASCULAR:  No pressure, squeezing, tightness, or heaviness about the chest; no palpitations.    RESPIRATORY:  Per HPI    GASTROINTESTINAL:  No abdominal pain, nausea, vomiting or diarrhea.    GENITOURINARY:  No dysuria, frequency or urgency.    NEUROLOGIC:  No paresthesias, fasciculations, seizures or weakness.    PSYCHIATRIC:  No disorder of thought or mood.      PHYSICAL EXAM:    Constitutional: Well developed and nourished  Eyes:Perrla  ENMT: normal  Neck:supple  Respiratory: good air entry  Cardiovascular: S1 S2 regular  Gastrointestinal: Soft, Non tender  Extremities: No edema  Vascular:normal  Neurological:Sedated  Musculoskeletal:Normal      MEDICATIONS  (STANDING):  acetaminophen    Suspension .. 650 milliGRAM(s) Oral <User Schedule>  albumin human 25% IVPB 50 milliLiter(s) IV Intermittent every 6 hours  ALBUTerol    90 MICROgram(s) HFA Inhaler 2 Puff(s) Inhalation every 6 hours  ascorbic acid 1000 milliGRAM(s) Oral daily  aspirin  chewable 81 milliGRAM(s) Oral daily  chlorhexidine 0.12% Liquid 15 milliLiter(s) Oral Mucosa two times a day  chlorhexidine 2% Cloths 1 Application(s) Topical <User Schedule>  fentaNYL   Infusion 4 MICROgram(s)/kG/Hr (33.5 mL/Hr) IV Continuous <Continuous>  furosemide   Injectable 40 milliGRAM(s) IV Push two times a day  hydrochlorothiazide 50 milliGRAM(s) Enteral Tube daily  ibuprofen  Suspension. 600 milliGRAM(s) Enteral Tube <User Schedule>  insulin lispro (ADMELOG) corrective regimen sliding scale   SubCutaneous every 6 hours  meropenem  IVPB 1000 milliGRAM(s) IV Intermittent every 8 hours  meropenem  IVPB      midazolam Infusion 0.02 mG/kG/Hr (1.68 mL/Hr) IV Continuous <Continuous>  midodrine 5 milliGRAM(s) Oral every 8 hours  pantoprazole  Injectable 40 milliGRAM(s) IV Push daily  phenylephrine    Infusion 0.2 MICROgram(s)/kG/Min (3.15 mL/Hr) IV Continuous <Continuous>  polyethylene glycol 3350 17 Gram(s) Oral daily  senna 2 Tablet(s) Oral at bedtime    MEDICATIONS  (PRN):  artificial  tears Solution 1 Drop(s) Both EYES every 4 hours PRN Dry Eyes  LORazepam     Tablet 1 milliGRAM(s) Oral every 4 hours PRN Agitation  sodium chloride 0.9% lock flush 10 milliLiter(s) IV Push every 1 hour PRN Pre/post blood products, medications, blood draw, and to maintain line patency      Allergies    No Known Allergies    Intolerances        LABS:                        7.8    6.37  )-----------( 403      ( 16 Apr 2021 04:40 )             25.5     04-16    148<H>  |  104  |  21<H>  ----------------------------<  99  4.4   |  >45<HH>  |  0.31<L>    Ca    8.3<L>      16 Apr 2021 04:40  Phos  2.0     04-16  Mg     2.1     04-16    TPro  5.7<L>  /  Alb  2.5<L>  /  TBili  0.6  /  DBili  x   /  AST  29  /  ALT  31  /  AlkPhos  72  04-16        ABG - ( 16 Apr 2021 04:21 )  pH, Arterial: 7.41  pH, Blood: x     /  pCO2: 86    /  pO2: 66    / HCO3: 54    / Base Excess: 24.3  /  SaO2: 95                    CAPILLARY BLOOD GLUCOSE      POCT Blood Glucose.: 125 mg/dL (16 Apr 2021 11:17)  POCT Blood Glucose.: 116 mg/dL (16 Apr 2021 05:14)  POCT Blood Glucose.: 106 mg/dL (16 Apr 2021 00:49)  POCT Blood Glucose.: 109 mg/dL (15 Apr 2021 17:03)    pro-bnp -- 04-15 @ 06:08     d-dimer 841  04-15 @ 06:08  pro-bnp -- 04-12 @ 04:26     d-dimer 1313  04-12 @ 04:26      RADIOLOGY & ADDITIONAL TESTS:    CXR:  < from: Xray Chest 1 View- PORTABLE-Routine (Xray Chest 1 View- PORTABLE-Routine in AM.) (04.15.21 @ 07:41) >  FINDINGS/  IMPRESSION:  ET tube, feeding tube, right central line, left chest tube again noted. No gross pneumothorax.    < end of copied text >    Ct scan chest:    ekg;    echo:

## 2021-04-16 NOTE — PROGRESS NOTE ADULT - ATTENDING COMMENTS
55 yr old  man , non smoker with  moody 1990s presented 3/14 with x9 days worsening cough, subjective fevers, and SOB, with x2-3 days dysuria and central, non-radiating, constant CP. Admitted to medicine unit  for acute hypoxic respiratory failure secondary to pna from covid-19 infection .     Assessment:  1. Acute hypoxic respiratory failure  2 Covid-19 infection   3. Transaminitis  4. Prediabetes  5. Bilateral pneumothorax  6. Septic shock     Plan   -Cont. antibiotics ID consultation appreciated  -pat intubated 3/29   -Mechanical ventilation with lung protective strategy, titrate down fio2 as tolerated  -adjust vent as per ABG  -Vasopressor support  -PTX  improved post chest tube placement  -chest tube removed 4/15  -Cont. precedex and fentanyl  -Thoracic surgery consult for PEG/Trach placement, once hemodynamically stable  -Cont. versed/fentanyl as patient asynchronous with the vent  -Albumin/lasix for diuresis  -isolation : contact and air borne   -monitor biomarkers daily, trending down   -Tube feedings, bowel regimen  -dvt/gi prophy  -hemodynamic monitoring   -Prognosis is guarded .   -on antibx for PNA

## 2021-04-17 LAB
ALBUMIN SERPL ELPH-MCNC: 3.1 G/DL — LOW (ref 3.5–5)
ALP SERPL-CCNC: 79 U/L — SIGNIFICANT CHANGE UP (ref 40–120)
ALT FLD-CCNC: 32 U/L DA — SIGNIFICANT CHANGE UP (ref 10–60)
ANION GAP SERPL CALC-SCNC: 16 MMOL/L — SIGNIFICANT CHANGE UP (ref 5–17)
AST SERPL-CCNC: 33 U/L — SIGNIFICANT CHANGE UP (ref 10–40)
BASE EXCESS BLDA CALC-SCNC: 24.5 MMOL/L — HIGH (ref -2–3)
BASOPHILS # BLD AUTO: 0.03 K/UL — SIGNIFICANT CHANGE UP (ref 0–0.2)
BASOPHILS NFR BLD AUTO: 0.4 % — SIGNIFICANT CHANGE UP (ref 0–2)
BILIRUB SERPL-MCNC: 0.5 MG/DL — SIGNIFICANT CHANGE UP (ref 0.2–1.2)
BLOOD GAS COMMENTS ARTERIAL: SIGNIFICANT CHANGE UP
BUN SERPL-MCNC: 20 MG/DL — HIGH (ref 7–18)
CALCIUM SERPL-MCNC: 8.6 MG/DL — SIGNIFICANT CHANGE UP (ref 8.4–10.5)
CHLORIDE SERPL-SCNC: 101 MMOL/L — SIGNIFICANT CHANGE UP (ref 96–108)
CO2 SERPL-SCNC: 28 MMOL/L — SIGNIFICANT CHANGE UP (ref 22–31)
CREAT SERPL-MCNC: 0.43 MG/DL — LOW (ref 0.5–1.3)
EOSINOPHIL # BLD AUTO: 0.15 K/UL — SIGNIFICANT CHANGE UP (ref 0–0.5)
EOSINOPHIL NFR BLD AUTO: 1.8 % — SIGNIFICANT CHANGE UP (ref 0–6)
GLUCOSE BLDC GLUCOMTR-MCNC: 102 MG/DL — HIGH (ref 70–99)
GLUCOSE BLDC GLUCOMTR-MCNC: 118 MG/DL — HIGH (ref 70–99)
GLUCOSE BLDC GLUCOMTR-MCNC: 98 MG/DL — SIGNIFICANT CHANGE UP (ref 70–99)
GLUCOSE SERPL-MCNC: 95 MG/DL — SIGNIFICANT CHANGE UP (ref 70–99)
HCO3 BLDA-SCNC: 55 MMOL/L — CRITICAL HIGH (ref 21–28)
HCT VFR BLD CALC: 25.6 % — LOW (ref 39–50)
HGB BLD-MCNC: 7.8 G/DL — LOW (ref 13–17)
HOROWITZ INDEX BLDA+IHG-RTO: 60 — SIGNIFICANT CHANGE UP
IMM GRANULOCYTES NFR BLD AUTO: 11.5 % — HIGH (ref 0–1.5)
LYMPHOCYTES # BLD AUTO: 0.95 K/UL — LOW (ref 1–3.3)
LYMPHOCYTES # BLD AUTO: 11.2 % — LOW (ref 13–44)
MAGNESIUM SERPL-MCNC: 2.2 MG/DL — SIGNIFICANT CHANGE UP (ref 1.6–2.6)
MCHC RBC-ENTMCNC: 30.5 GM/DL — LOW (ref 32–36)
MCHC RBC-ENTMCNC: 32.6 PG — SIGNIFICANT CHANGE UP (ref 27–34)
MCV RBC AUTO: 107.1 FL — HIGH (ref 80–100)
MONOCYTES # BLD AUTO: 0.23 K/UL — SIGNIFICANT CHANGE UP (ref 0–0.9)
MONOCYTES NFR BLD AUTO: 2.7 % — SIGNIFICANT CHANGE UP (ref 2–14)
NEUTROPHILS # BLD AUTO: 6.15 K/UL — SIGNIFICANT CHANGE UP (ref 1.8–7.4)
NEUTROPHILS NFR BLD AUTO: 72.4 % — SIGNIFICANT CHANGE UP (ref 43–77)
NRBC # BLD: 2 /100 WBCS — HIGH (ref 0–0)
PCO2 BLDA: 89 MMHG — CRITICAL HIGH (ref 35–48)
PH BLDA: 7.4 — SIGNIFICANT CHANGE UP (ref 7.35–7.45)
PHOSPHATE SERPL-MCNC: 2.9 MG/DL — SIGNIFICANT CHANGE UP (ref 2.5–4.5)
PLATELET # BLD AUTO: 472 K/UL — HIGH (ref 150–400)
PO2 BLDA: 82 MMHG — LOW (ref 83–108)
POTASSIUM SERPL-MCNC: 3.9 MMOL/L — SIGNIFICANT CHANGE UP (ref 3.5–5.3)
POTASSIUM SERPL-SCNC: 3.9 MMOL/L — SIGNIFICANT CHANGE UP (ref 3.5–5.3)
PROCALCITONIN SERPL-MCNC: 0.42 NG/ML — HIGH (ref 0.02–0.1)
PROT SERPL-MCNC: 6.1 G/DL — SIGNIFICANT CHANGE UP (ref 6–8.3)
RBC # BLD: 2.39 M/UL — LOW (ref 4.2–5.8)
RBC # FLD: 14.9 % — HIGH (ref 10.3–14.5)
SAO2 % BLDA: 99 % — SIGNIFICANT CHANGE UP
SODIUM SERPL-SCNC: 145 MMOL/L — SIGNIFICANT CHANGE UP (ref 135–145)
TRIGL SERPL-MCNC: 275 MG/DL — HIGH
WBC # BLD: 8.49 K/UL — SIGNIFICANT CHANGE UP (ref 3.8–10.5)
WBC # FLD AUTO: 8.49 K/UL — SIGNIFICANT CHANGE UP (ref 3.8–10.5)

## 2021-04-17 PROCEDURE — 71045 X-RAY EXAM CHEST 1 VIEW: CPT | Mod: 26

## 2021-04-17 RX ADMIN — ALBUTEROL 2 PUFF(S): 90 AEROSOL, METERED ORAL at 09:10

## 2021-04-17 RX ADMIN — Medication 1000 MILLIGRAM(S): at 11:07

## 2021-04-17 RX ADMIN — ENOXAPARIN SODIUM 40 MILLIGRAM(S): 100 INJECTION SUBCUTANEOUS at 11:07

## 2021-04-17 RX ADMIN — CHLORHEXIDINE GLUCONATE 15 MILLILITER(S): 213 SOLUTION TOPICAL at 12:38

## 2021-04-17 RX ADMIN — MEROPENEM 100 MILLIGRAM(S): 1 INJECTION INTRAVENOUS at 05:13

## 2021-04-17 RX ADMIN — Medication 50 MILLIGRAM(S): at 05:14

## 2021-04-17 RX ADMIN — Medication 40 MILLIGRAM(S): at 17:06

## 2021-04-17 RX ADMIN — SENNA PLUS 2 TABLET(S): 8.6 TABLET ORAL at 21:45

## 2021-04-17 RX ADMIN — MIDODRINE HYDROCHLORIDE 5 MILLIGRAM(S): 2.5 TABLET ORAL at 05:14

## 2021-04-17 RX ADMIN — FENTANYL CITRATE 33.5 MICROGRAM(S)/KG/HR: 50 INJECTION INTRAVENOUS at 01:01

## 2021-04-17 RX ADMIN — Medication 600 MILLIGRAM(S): at 10:40

## 2021-04-17 RX ADMIN — Medication 50 MILLILITER(S): at 05:13

## 2021-04-17 RX ADMIN — Medication 600 MILLIGRAM(S): at 00:40

## 2021-04-17 RX ADMIN — MIDAZOLAM HYDROCHLORIDE 1.68 MG/KG/HR: 1 INJECTION, SOLUTION INTRAMUSCULAR; INTRAVENOUS at 15:53

## 2021-04-17 RX ADMIN — Medication 600 MILLIGRAM(S): at 23:19

## 2021-04-17 RX ADMIN — MEROPENEM 100 MILLIGRAM(S): 1 INJECTION INTRAVENOUS at 21:45

## 2021-04-17 RX ADMIN — Medication 650 MILLIGRAM(S): at 21:50

## 2021-04-17 RX ADMIN — FENTANYL CITRATE 33.5 MICROGRAM(S)/KG/HR: 50 INJECTION INTRAVENOUS at 08:17

## 2021-04-17 RX ADMIN — ALBUTEROL 2 PUFF(S): 90 AEROSOL, METERED ORAL at 16:15

## 2021-04-17 RX ADMIN — MIDODRINE HYDROCHLORIDE 5 MILLIGRAM(S): 2.5 TABLET ORAL at 21:45

## 2021-04-17 RX ADMIN — Medication 650 MILLIGRAM(S): at 11:08

## 2021-04-17 RX ADMIN — MEROPENEM 100 MILLIGRAM(S): 1 INJECTION INTRAVENOUS at 12:55

## 2021-04-17 RX ADMIN — Medication 600 MILLIGRAM(S): at 15:23

## 2021-04-17 RX ADMIN — ALBUTEROL 2 PUFF(S): 90 AEROSOL, METERED ORAL at 05:27

## 2021-04-17 RX ADMIN — Medication 650 MILLIGRAM(S): at 11:06

## 2021-04-17 RX ADMIN — POLYETHYLENE GLYCOL 3350 17 GRAM(S): 17 POWDER, FOR SOLUTION ORAL at 11:08

## 2021-04-17 RX ADMIN — FENTANYL CITRATE 33.5 MICROGRAM(S)/KG/HR: 50 INJECTION INTRAVENOUS at 17:35

## 2021-04-17 RX ADMIN — Medication 40 MILLIGRAM(S): at 05:13

## 2021-04-17 RX ADMIN — MIDAZOLAM HYDROCHLORIDE 1.68 MG/KG/HR: 1 INJECTION, SOLUTION INTRAMUSCULAR; INTRAVENOUS at 08:18

## 2021-04-17 RX ADMIN — CHLORHEXIDINE GLUCONATE 1 APPLICATION(S): 213 SOLUTION TOPICAL at 05:13

## 2021-04-17 RX ADMIN — Medication 650 MILLIGRAM(S): at 03:58

## 2021-04-17 RX ADMIN — Medication 81 MILLIGRAM(S): at 11:08

## 2021-04-17 RX ADMIN — CHLORHEXIDINE GLUCONATE 15 MILLILITER(S): 213 SOLUTION TOPICAL at 05:13

## 2021-04-17 RX ADMIN — PANTOPRAZOLE SODIUM 40 MILLIGRAM(S): 20 TABLET, DELAYED RELEASE ORAL at 11:08

## 2021-04-17 RX ADMIN — MIDODRINE HYDROCHLORIDE 5 MILLIGRAM(S): 2.5 TABLET ORAL at 12:46

## 2021-04-17 RX ADMIN — Medication 650 MILLIGRAM(S): at 20:08

## 2021-04-17 RX ADMIN — Medication 600 MILLIGRAM(S): at 10:41

## 2021-04-17 NOTE — PROGRESS NOTE ADULT - ATTENDING COMMENTS
55 yr old  man , non smoker with  moody 1990s presented 3/14 with x9 days worsening cough, subjective fevers, and SOB, with x2-3 days dysuria and central, non-radiating, constant CP. Admitted to medicine unit  for acute hypoxic respiratory failure secondary to pna from covid-19 infection .     Assessment:  1. Acute hypoxic respiratory failure  2 Covid-19 infection   3. Transaminitis  4. Prediabetes  5. Bilateral pneumothorax  6. Septic shock     Plan   -Cont. antibiotics ID consultation appreciated  -pat intubated 3/29   -Mechanical ventilation with lung protective strategy, titrate down fio2 as tolerated  -adjust vent as per ABG  -permissive hypercapnia   -Vasopressor support  -PTX  improved post chest tube placement  -chest tube removed 4/15  -Cont. precedex and fentanyl  -Thoracic surgery consult for PEG/Trach placement, once hemodynamically stable  -Cont. versed/fentanyl as patient asynchronous with the vent  -Albumin/lasix for diuresis  -isolation : contact and air borne   -monitor biomarkers daily, trending down   -Tube feedings, bowel regimen  -dvt/gi prophy  -hemodynamic monitoring   -Prognosis is guarded .   -on antibx for PNA . 55 yr old  man , non smoker with  moody 1990s presented 3/14 with x9 days worsening cough, subjective fevers, and SOB, with x2-3 days dysuria and central, non-radiating, constant CP. Admitted to medicine unit  for acute hypoxic respiratory failure secondary to pna from covid-19 infection .     Assessment:  1. Acute hypoxic respiratory failure  2 Covid-19 infection   3. Transaminitis  4. Prediabetes  5. Bilateral pneumothorax  6. Septic shock     Plan   -Cont. antibiotics ID consultation appreciated  -pat intubated 3/29   -Mechanical ventilation with lung protective strategy, titrate down fio2 as tolerated  -adjust vent as per ABG  -permissive hypercapnia   -Vasopressor support  -PTX  improved post chest tube placement  -chest tube removed 4/15  -Cont. precedex and fentanyl  -Thoracic surgery consult for PEG/Trach placement, once hemodynamically stable  -Cont. versed/fentanyl as patient asynchronous with the vent  -Albumin/lasix for diuresis  -isolation : contact and air borne   -monitor biomarkers daily, trending down   -Tube feedings, bowel regimen  -dvt/gi prophy  -hemodynamic monitoring   -Prognosis is guarded . .   -on antibx for PNA .

## 2021-04-17 NOTE — CHART NOTE - NSCHARTNOTEFT_GEN_A_CORE
Spoke to brother Brennan to give daily update, informed him about current medical management, overnight events. Questions answered.

## 2021-04-17 NOTE — PROGRESS NOTE ADULT - ASSESSMENT
Assessment and Recommendation:   Problem/Recommendation - 1:  Problem: COVID-19. Recommendation: isolation precautions  cont mechanical ventilation  taper FIO2 as tolerated  off  pressors meds   Wean as tolerated  pulmonary toilet  NGT nutrition  ICU management.   Monitor oxygen sat  Monitor LFT, LDH, CRP, D-Dimer, Ferritin and procalcitonin  Vit C, D and zinc supp  Montelukast 10 mgs po Qhs  IV steroids.  Daily CXR   DVT and GI PPX   May require trach and Peg     Problem/Recommendation - 2:  ·  Problem: UTI (urinary tract infection).  Recommendation: F.U cultures   Off Antibiotics.     Problem/Recommendation - 3:  ·  Problem: Chest pain.  Recommendation: likely related to Covid-19 infection.     Problem/Recommendation - 4:  ·  Problem: Transaminitis.  Recommendation: monitor LFTs  GI F/U     Problem/Recommendation - 5:  ·  Problem: Pneumothorax.  Recommendation: s/p left sided chest tube  Thoracic sx follow up  Daily  CXR   Management per ICU

## 2021-04-17 NOTE — PROGRESS NOTE ADULT - ASSESSMENT
1. Acute hypoxic respiratory failure  2. ARDS 2/2 Covid pneumonia  3. Transaminitis  4. Prediabetes  5. Abnormal TSH    =================== Neuro============================  Alert and oriented x 3 at baseline   intubated and sedated 3/29 on Vent  Continue Versed to help with vent synchrony with Fentanyl  CT head unremarkable     ================= Cardiovascular==========================  # Hypotension   Off pressors   C/w midodrine   Not requiring phenylephrine at present       # TACHYCARDIA: improving  -HR in 90s-100s.   -continue monitoring     ================- Pulm=================================  #Acute hypoxic respiratory failure: secondary to Covid pneumonia  #ARDS  -was on HFNr then got intubated 3/29  -VC Vent setting : 30/400/60/8  -D-dimer initially elevated on presentation, Slowly rising again   - Patient on increased FiO2 requirement, will monitor   - Thoracic surgery will take for Trach and peg once O2 requirements come down     -Remdesivir was discontinued due to positive antibodies   - c/w supportive care   - trend COVID  markers  - Finished Dexamethasone   - prednisone taper, Finished      # Bacterial Pneumonia  - stable infiltrate on CXR ,likely developed VAP, on Zosyn, s/p 1 dose of Vanco   - continue Meropenem 4/13, f/u ID for further recs  - MRSA negative from 3/26  - f/u sputum Cx 4/15  -pulm. dr. Ryan  - ID consult Dr Anand       #Pneumothorax and pneumomediastinum: resolved  -X-ray chest: tiny left apical pneumothorax  -Thoracic surgery following  -s/p Chest tube placed 4/8 pigtail to wall suction.  d/c'd 4/15  -X-ray monitoring daily, PTX resolved      ==================ID===================================  Likely bacterial pneumonia at present  -leukocytosis resolved  -Still spiking fevers,  started on Alternative doses of Motrin and Tylenol for fever control  - Continue Meropenem (D 4),   - C-procal elevated  -plan as above     ================= Nephro================================  -pitting edema to both lower arms  -on condom cath   -monitor I/Os   -Net negative , Urine output monitoring  -s/p Albumin x 2 days on 4/10  -On Lasix 40mg BID with Albumin 25% Q6 for 48 hours to help with urine output and fluid status.( 4/15)  -C/w HCTZ 50mg daily   -monitor lytes, CMP  -Add free water via tube    =================GI====================================  Transaminitis:   likely secondary to Covid19   - and  on presentation   hepatitis panel -ve  -Improved  -continue to monitor LFT    diet:   NGT (3/29) with tube feed  bowel regimen   Last BM 4/16      ================ Heme==================================  Elevated d-dimer: likely secondary to Covid  -d-dimer 423 on admission  -last D-dimer 841<---1313  -continue prophylactic Lovenox 40 mg daily    Anemia  Hgb 7.3, no active signs of bleeding  repeat CBC pm  f/u iron panel, FOBT  will transfuse  if hgb drops.     =================Endocrine===============================  Prediabetes:    -A1c  5.8  -BS controlled  -continue HSS  -monitor FS while on steroids    Abnormal TSH:  -TSH level noted 0.26,   -repeat TSH 0.37 and Free T4 1.82    ================= Skin/Catheters============================  No rashes. Peripheral IV lines.   RIJ 4/13  RRA 3/29, Removed    - =================Prophylaxis =============================  Lovenox for DVT proph : on hold for now for anemia, may resume when CBC improving  Protonix for  GI proph    ==================GOC==================================   FULL CODE

## 2021-04-17 NOTE — PROGRESS NOTE ADULT - SUBJECTIVE AND OBJECTIVE BOX
Patient is a 55y old  Male who presents with a chief complaint of SOB (17 Apr 2021 06:43)  Patient remains intubated on mechanical ventilation. Still with chest tube on left. Sedated, Off  pressors meds    INTERVAL HPI/OVERNIGHT EVENTS:      VITAL SIGNS:  T(F): 97.2 (04-17-21 @ 09:00)  HR: 102 (04-17-21 @ 11:00)  BP: 125/64 (04-17-21 @ 11:00)  RR: 26 (04-17-21 @ 11:00)  SpO2: 94% (04-17-21 @ 11:00)  Wt(kg): --  I&O's Detail    16 Apr 2021 07:01  -  17 Apr 2021 07:00  --------------------------------------------------------  IN:    Albumin 25%  -  50 mL: 200 mL    Enteral Tube Flush: 100 mL    FentaNYL: 805.8 mL    Glucerna 1.5: 600 mL    IV PiggyBack: 200 mL    IV PiggyBack: 187.5 mL    Midazolam: 301.8 mL  Total IN: 2395.1 mL    OUT:    Incontinent per Condom Catheter (mL): 2200 mL    Phenylephrine: 0 mL  Total OUT: 2200 mL    Total NET: 195.1 mL      17 Apr 2021 07:01  -  17 Apr 2021 11:57  --------------------------------------------------------  IN:    Enteral Tube Flush: 50 mL    FentaNYL: 165 mL    Glucerna 1.5: 125 mL    Midazolam: 60 mL  Total IN: 400 mL    OUT:    Incontinent per Condom Catheter (mL): 500 mL  Total OUT: 500 mL    Total NET: -100 mL        Mode: AC/ CMV (Assist Control/ Continuous Mandatory Ventilation)  RR (machine): 30  TV (machine): 400  FiO2: 60  PEEP: 8  ITime: 0.8  MAP: 17  PIP: 37        REVIEW OF SYSTEMS:    CONSTITUTIONAL:  No fevers, chills, sweats    HEENT:  Eyes:  No diplopia or blurred vision. ENT:  No earache, sore throat or runny nose.    CARDIOVASCULAR:  No pressure, squeezing, tightness, or heaviness about the chest; no palpitations.    RESPIRATORY:  Per HPI    GASTROINTESTINAL:  No abdominal pain, nausea, vomiting or diarrhea.    GENITOURINARY:  No dysuria, frequency or urgency.    NEUROLOGIC:  No paresthesias, fasciculations, seizures or weakness.    PSYCHIATRIC:  No disorder of thought or mood.      PHYSICAL EXAM:    Constitutional: Well developed and nourished  Eyes:Perrla  ENMT: normal  Neck:supple  Respiratory: good air entry  Cardiovascular: S1 S2 regular  Gastrointestinal: Soft, Non tender  Extremities: No edema  Vascular:normal  Neurological:Awake, alert,Ox3  Musculoskeletal:Normal      MEDICATIONS  (STANDING):  acetaminophen    Suspension .. 650 milliGRAM(s) Oral <User Schedule>  ALBUTerol    90 MICROgram(s) HFA Inhaler 2 Puff(s) Inhalation every 6 hours  ascorbic acid 1000 milliGRAM(s) Oral daily  aspirin  chewable 81 milliGRAM(s) Oral daily  chlorhexidine 0.12% Liquid 15 milliLiter(s) Oral Mucosa two times a day  chlorhexidine 2% Cloths 1 Application(s) Topical <User Schedule>  enoxaparin Injectable 40 milliGRAM(s) SubCutaneous daily  fentaNYL   Infusion 4 MICROgram(s)/kG/Hr (33.5 mL/Hr) IV Continuous <Continuous>  furosemide   Injectable 40 milliGRAM(s) IV Push two times a day  hydrochlorothiazide 50 milliGRAM(s) Enteral Tube daily  ibuprofen  Suspension. 600 milliGRAM(s) Enteral Tube <User Schedule>  insulin lispro (ADMELOG) corrective regimen sliding scale   SubCutaneous every 6 hours  meropenem  IVPB 1000 milliGRAM(s) IV Intermittent every 8 hours  meropenem  IVPB      midazolam Infusion 0.02 mG/kG/Hr (1.68 mL/Hr) IV Continuous <Continuous>  midodrine 5 milliGRAM(s) Oral every 8 hours  pantoprazole  Injectable 40 milliGRAM(s) IV Push daily  polyethylene glycol 3350 17 Gram(s) Oral daily  senna 2 Tablet(s) Oral at bedtime    MEDICATIONS  (PRN):  artificial  tears Solution 1 Drop(s) Both EYES every 4 hours PRN Dry Eyes  LORazepam     Tablet 1 milliGRAM(s) Oral every 4 hours PRN Agitation  sodium chloride 0.9% lock flush 10 milliLiter(s) IV Push every 1 hour PRN Pre/post blood products, medications, blood draw, and to maintain line patency      Allergies    No Known Allergies    Intolerances        LABS:                        7.8    8.49  )-----------( 472      ( 17 Apr 2021 05:59 )             25.6     04-17    145  |  101  |  20<H>  ----------------------------<  95  3.9   |  28  |  0.43<L>    Ca    8.6      17 Apr 2021 05:59  Phos  2.9     04-17  Mg     2.2     04-17    TPro  6.1  /  Alb  3.1<L>  /  TBili  0.5  /  DBili  x   /  AST  33  /  ALT  32  /  AlkPhos  79  04-17        ABG - ( 17 Apr 2021 04:16 )  pH, Arterial: 7.40  pH, Blood: x     /  pCO2: 89    /  pO2: 82    / HCO3: 55    / Base Excess: 24.5  /  SaO2: 99                    CAPILLARY BLOOD GLUCOSE      POCT Blood Glucose.: 118 mg/dL (17 Apr 2021 10:35)  POCT Blood Glucose.: 102 mg/dL (17 Apr 2021 05:09)  POCT Blood Glucose.: 100 mg/dL (16 Apr 2021 22:26)  POCT Blood Glucose.: 103 mg/dL (16 Apr 2021 16:35)    pro-bnp -- 04-15 @ 06:08     d-dimer 841  04-15 @ 06:08  pro-bnp -- 04-12 @ 04:26     d-dimer 1313  04-12 @ 04:26      RADIOLOGY & ADDITIONAL TESTS:    CXR:  < from: Xray Chest 1 View- PORTABLE-Routine (Xray Chest 1 View- PORTABLE-Routine in AM.) (04.16.21 @ 07:28) >  IMPRESSION: Endotracheal tube nasogastric tube right internal jugular line reidentifiedin position. Left chest catheter has been removed. No pneumothorax. No change bilateral airspace disease small left pleural effusion.      < end of copied text >    Ct scan chest:    ekg;    echo: Patient is a 55y old  Male who presents with a chief complaint of SOB (17 Apr 2021 06:43)  Patient remains intubated on mechanical ventilation. Still with chest tube on left. Sedated, Off  pressors meds    INTERVAL HPI/OVERNIGHT EVENTS:      VITAL SIGNS:  T(F): 97.2 (04-17-21 @ 09:00)  HR: 102 (04-17-21 @ 11:00)  BP: 125/64 (04-17-21 @ 11:00)  RR: 26 (04-17-21 @ 11:00)  SpO2: 94% (04-17-21 @ 11:00)  Wt(kg): --  I&O's Detail    16 Apr 2021 07:01  -  17 Apr 2021 07:00  --------------------------------------------------------  IN:    Albumin 25%  -  50 mL: 200 mL    Enteral Tube Flush: 100 mL    FentaNYL: 805.8 mL    Glucerna 1.5: 600 mL    IV PiggyBack: 200 mL    IV PiggyBack: 187.5 mL    Midazolam: 301.8 mL  Total IN: 2395.1 mL    OUT:    Incontinent per Condom Catheter (mL): 2200 mL    Phenylephrine: 0 mL  Total OUT: 2200 mL    Total NET: 195.1 mL      17 Apr 2021 07:01  -  17 Apr 2021 11:57  --------------------------------------------------------  IN:    Enteral Tube Flush: 50 mL    FentaNYL: 165 mL    Glucerna 1.5: 125 mL    Midazolam: 60 mL  Total IN: 400 mL    OUT:    Incontinent per Condom Catheter (mL): 500 mL  Total OUT: 500 mL    Total NET: -100 mL        Mode: AC/ CMV (Assist Control/ Continuous Mandatory Ventilation)  RR (machine): 30  TV (machine): 400  FiO2: 60  PEEP: 8  ITime: 0.8  MAP: 17  PIP: 37        REVIEW OF SYSTEMS:    CONSTITUTIONAL:  No fevers, chills, sweats    HEENT:  Eyes:  No diplopia or blurred vision. ENT:  No earache, sore throat or runny nose.    CARDIOVASCULAR:  No pressure, squeezing, tightness, or heaviness about the chest; no palpitations.    RESPIRATORY:  Per HPI    GASTROINTESTINAL:  No abdominal pain, nausea, vomiting or diarrhea.    GENITOURINARY:  No dysuria, frequency or urgency.    NEUROLOGIC:  No paresthesias, fasciculations, seizures or weakness.    PSYCHIATRIC:  No disorder of thought or mood.      PHYSICAL EXAM:    Constitutional: Well developed and nourished  Eyes:Perrla  ENMT: normal  Neck:supple  Respiratory: good air entry  Cardiovascular: S1 S2 regular  Gastrointestinal: Soft, Non tender  Extremities: No edema  Vascular:normal  Neurological:Sedated  Musculoskeletal:Normal      MEDICATIONS  (STANDING):  acetaminophen    Suspension .. 650 milliGRAM(s) Oral <User Schedule>  ALBUTerol    90 MICROgram(s) HFA Inhaler 2 Puff(s) Inhalation every 6 hours  ascorbic acid 1000 milliGRAM(s) Oral daily  aspirin  chewable 81 milliGRAM(s) Oral daily  chlorhexidine 0.12% Liquid 15 milliLiter(s) Oral Mucosa two times a day  chlorhexidine 2% Cloths 1 Application(s) Topical <User Schedule>  enoxaparin Injectable 40 milliGRAM(s) SubCutaneous daily  fentaNYL   Infusion 4 MICROgram(s)/kG/Hr (33.5 mL/Hr) IV Continuous <Continuous>  furosemide   Injectable 40 milliGRAM(s) IV Push two times a day  hydrochlorothiazide 50 milliGRAM(s) Enteral Tube daily  ibuprofen  Suspension. 600 milliGRAM(s) Enteral Tube <User Schedule>  insulin lispro (ADMELOG) corrective regimen sliding scale   SubCutaneous every 6 hours  meropenem  IVPB 1000 milliGRAM(s) IV Intermittent every 8 hours  meropenem  IVPB      midazolam Infusion 0.02 mG/kG/Hr (1.68 mL/Hr) IV Continuous <Continuous>  midodrine 5 milliGRAM(s) Oral every 8 hours  pantoprazole  Injectable 40 milliGRAM(s) IV Push daily  polyethylene glycol 3350 17 Gram(s) Oral daily  senna 2 Tablet(s) Oral at bedtime    MEDICATIONS  (PRN):  artificial  tears Solution 1 Drop(s) Both EYES every 4 hours PRN Dry Eyes  LORazepam     Tablet 1 milliGRAM(s) Oral every 4 hours PRN Agitation  sodium chloride 0.9% lock flush 10 milliLiter(s) IV Push every 1 hour PRN Pre/post blood products, medications, blood draw, and to maintain line patency      Allergies    No Known Allergies    Intolerances        LABS:                        7.8    8.49  )-----------( 472      ( 17 Apr 2021 05:59 )             25.6     04-17    145  |  101  |  20<H>  ----------------------------<  95  3.9   |  28  |  0.43<L>    Ca    8.6      17 Apr 2021 05:59  Phos  2.9     04-17  Mg     2.2     04-17    TPro  6.1  /  Alb  3.1<L>  /  TBili  0.5  /  DBili  x   /  AST  33  /  ALT  32  /  AlkPhos  79  04-17        ABG - ( 17 Apr 2021 04:16 )  pH, Arterial: 7.40  pH, Blood: x     /  pCO2: 89    /  pO2: 82    / HCO3: 55    / Base Excess: 24.5  /  SaO2: 99                    CAPILLARY BLOOD GLUCOSE      POCT Blood Glucose.: 118 mg/dL (17 Apr 2021 10:35)  POCT Blood Glucose.: 102 mg/dL (17 Apr 2021 05:09)  POCT Blood Glucose.: 100 mg/dL (16 Apr 2021 22:26)  POCT Blood Glucose.: 103 mg/dL (16 Apr 2021 16:35)    pro-bnp -- 04-15 @ 06:08     d-dimer 841  04-15 @ 06:08  pro-bnp -- 04-12 @ 04:26     d-dimer 1313  04-12 @ 04:26      RADIOLOGY & ADDITIONAL TESTS:    CXR:  < from: Xray Chest 1 View- PORTABLE-Routine (Xray Chest 1 View- PORTABLE-Routine in AM.) (04.16.21 @ 07:28) >  IMPRESSION: Endotracheal tube nasogastric tube right internal jugular line reidentifiedin position. Left chest catheter has been removed. No pneumothorax. No change bilateral airspace disease small left pleural effusion.      < end of copied text >    Ct scan chest:    ekg;    echo:

## 2021-04-17 NOTE — PROGRESS NOTE ADULT - SUBJECTIVE AND OBJECTIVE BOX
INTERVAL HPI/ OVERNIGHT EVENTS:       PRESSORS: [ ] YES [ ] NO    Antimicrobial:  meropenem  IVPB 1000 milliGRAM(s) IV Intermittent every 8 hours  meropenem  IVPB        Cardiovascular:  furosemide   Injectable 40 milliGRAM(s) IV Push two times a day  hydrochlorothiazide 50 milliGRAM(s) Enteral Tube daily  midodrine 5 milliGRAM(s) Oral every 8 hours    Pulmonary:  ALBUTerol    90 MICROgram(s) HFA Inhaler 2 Puff(s) Inhalation every 6 hours    Hematologic:  aspirin  chewable 81 milliGRAM(s) Oral daily  enoxaparin Injectable 40 milliGRAM(s) SubCutaneous daily    Other:  acetaminophen    Suspension .. 650 milliGRAM(s) Oral <User Schedule>  artificial  tears Solution 1 Drop(s) Both EYES every 4 hours PRN  ascorbic acid 1000 milliGRAM(s) Oral daily  chlorhexidine 0.12% Liquid 15 milliLiter(s) Oral Mucosa two times a day  chlorhexidine 2% Cloths 1 Application(s) Topical <User Schedule>  fentaNYL   Infusion 4 MICROgram(s)/kG/Hr IV Continuous <Continuous>  ibuprofen  Suspension. 600 milliGRAM(s) Enteral Tube <User Schedule>  insulin lispro (ADMELOG) corrective regimen sliding scale   SubCutaneous every 6 hours  LORazepam     Tablet 1 milliGRAM(s) Oral every 4 hours PRN  midazolam Infusion 0.02 mG/kG/Hr IV Continuous <Continuous>  pantoprazole  Injectable 40 milliGRAM(s) IV Push daily  polyethylene glycol 3350 17 Gram(s) Oral daily  senna 2 Tablet(s) Oral at bedtime  sodium chloride 0.9% lock flush 10 milliLiter(s) IV Push every 1 hour PRN    acetaminophen    Suspension .. 650 milliGRAM(s) Oral <User Schedule>  ALBUTerol    90 MICROgram(s) HFA Inhaler 2 Puff(s) Inhalation every 6 hours  artificial  tears Solution 1 Drop(s) Both EYES every 4 hours PRN  ascorbic acid 1000 milliGRAM(s) Oral daily  aspirin  chewable 81 milliGRAM(s) Oral daily  chlorhexidine 0.12% Liquid 15 milliLiter(s) Oral Mucosa two times a day  chlorhexidine 2% Cloths 1 Application(s) Topical <User Schedule>  enoxaparin Injectable 40 milliGRAM(s) SubCutaneous daily  fentaNYL   Infusion 4 MICROgram(s)/kG/Hr IV Continuous <Continuous>  furosemide   Injectable 40 milliGRAM(s) IV Push two times a day  hydrochlorothiazide 50 milliGRAM(s) Enteral Tube daily  ibuprofen  Suspension. 600 milliGRAM(s) Enteral Tube <User Schedule>  insulin lispro (ADMELOG) corrective regimen sliding scale   SubCutaneous every 6 hours  LORazepam     Tablet 1 milliGRAM(s) Oral every 4 hours PRN  meropenem  IVPB 1000 milliGRAM(s) IV Intermittent every 8 hours  meropenem  IVPB      midazolam Infusion 0.02 mG/kG/Hr IV Continuous <Continuous>  midodrine 5 milliGRAM(s) Oral every 8 hours  pantoprazole  Injectable 40 milliGRAM(s) IV Push daily  polyethylene glycol 3350 17 Gram(s) Oral daily  senna 2 Tablet(s) Oral at bedtime  sodium chloride 0.9% lock flush 10 milliLiter(s) IV Push every 1 hour PRN    Drug Dosing Weight  Height (cm): 167.6 (14 Mar 2021 12:03)  Weight (kg): 83.869 (14 Mar 2021 12:03)  BMI (kg/m2): 29.9 (14 Mar 2021 12:03)  BSA (m2): 1.93 (14 Mar 2021 12:03)    CENTRAL LINE: [ ] YES [ ] NO  LOCATION:   DATE INSERTED:  REMOVE: [ ] YES [ ] NO  EXPLAIN:    RIVERA: [ ] YES [ ] NO    DATE INSERTED:  REMOVE:  [ ] YES [ ] NO  EXPLAIN:    A-LINE:  [ ] YES [ ] NO  LOCATION:   DATE INSERTED:  REMOVE:  [ ] YES [ ] NO  EXPLAIN:    PMH -reviewed admission note, no change since admission      ABG - ( 17 Apr 2021 04:16 )  pH, Arterial: 7.40  pH, Blood: x     /  pCO2: 89    /  pO2: 82    / HCO3: 55    / Base Excess: 24.5  /  SaO2: 99                    04-15 @ 07:01  -  04-16 @ 07:00  --------------------------------------------------------  IN: 2883 mL / OUT: 4000 mL / NET: -1117 mL        Mode: AC/ CMV (Assist Control/ Continuous Mandatory Ventilation)  RR (machine): 30  TV (machine): 400  FiO2: 60  PEEP: 8  ITime: 0.8  MAP: 19  PIP: 40      PHYSICAL EXAM:    GENERAL: NAD, well-groomed, well-developed  HEAD:  Atraumatic, Normocephalic  EYES: EOMI, PERRLA, conjunctiva and sclera clear  ENMT: No tonsillar erythema, exudates, or enlargement; Moist mucous membranes, Good dentition, [ ]No lesions  NECK: Supple, normal appearance, No JVD; Normal thyroid; Trachea midline  NERVOUS SYSTEM:  Alert & Oriented X3, Good concentration; Motor Strength 5/5 B/L upper and lower extremities; DTRs 2+ intact and symmetric  CHEST/LUNG: No chest deformity; Normal percussion bilaterally; No rales, rhonchi, wheezing   HEART: Regular rate and rhythm; No murmurs, rubs, or gallops  ABDOMEN: Soft, Nontender, Nondistended;Bowel sounds present  EXTREMITIES:  2+ Peripheral Pulses, No clubbing, cyanosis, or edema  LYMPH: No lymphadenopathy noted  SKIN: No rashes or lesions; Good capillary refill      LABS:  CBC Full  -  ( 16 Apr 2021 17:25 )  WBC Count : 8.02 K/uL  RBC Count : 2.55 M/uL  Hemoglobin : 8.1 g/dL  Hematocrit : 26.7 %  Platelet Count - Automated : 415 K/uL  Mean Cell Volume : 104.7 fl  Mean Cell Hemoglobin : 31.8 pg  Mean Cell Hemoglobin Concentration : 30.3 gm/dL  Auto Neutrophil # : x  Auto Lymphocyte # : x  Auto Monocyte # : x  Auto Eosinophil # : x  Auto Basophil # : x  Auto Neutrophil % : x  Auto Lymphocyte % : x  Auto Monocyte % : x  Auto Eosinophil % : x  Auto Basophil % : x    04-16    147<H>  |  102  |  20<H>  ----------------------------<  128<H>  3.7   |  45<HH>  |  0.42<L>    Ca    8.5      16 Apr 2021 17:25  Phos  2.0     04-16  Mg     2.1     04-16    TPro  5.7<L>  /  Alb  2.5<L>  /  TBili  0.6  /  DBili  x   /  AST  29  /  ALT  31  /  AlkPhos  72  04-16            RADIOLOGY & ADDITIONAL STUDIES REVIEWED:  ***    [ ]GOALS OF CARE DISCUSSION WITH PATIENT/FAMILY/PROXY:    CRITICAL CARE TIME SPENT: 35 minutes INTERVAL HPI/ OVERNIGHT EVENTS: patient remains intubated, afebrile, no overnight events      PRESSORS: [ ] YES [ ] NO    Antimicrobial:  meropenem  IVPB 1000 milliGRAM(s) IV Intermittent every 8 hours  meropenem  IVPB        Cardiovascular:  furosemide   Injectable 40 milliGRAM(s) IV Push two times a day  hydrochlorothiazide 50 milliGRAM(s) Enteral Tube daily  midodrine 5 milliGRAM(s) Oral every 8 hours    Pulmonary:  ALBUTerol    90 MICROgram(s) HFA Inhaler 2 Puff(s) Inhalation every 6 hours    Hematologic:  aspirin  chewable 81 milliGRAM(s) Oral daily  enoxaparin Injectable 40 milliGRAM(s) SubCutaneous daily    Other:  acetaminophen    Suspension .. 650 milliGRAM(s) Oral <User Schedule>  artificial  tears Solution 1 Drop(s) Both EYES every 4 hours PRN  ascorbic acid 1000 milliGRAM(s) Oral daily  chlorhexidine 0.12% Liquid 15 milliLiter(s) Oral Mucosa two times a day  chlorhexidine 2% Cloths 1 Application(s) Topical <User Schedule>  fentaNYL   Infusion 4 MICROgram(s)/kG/Hr IV Continuous <Continuous>  ibuprofen  Suspension. 600 milliGRAM(s) Enteral Tube <User Schedule>  insulin lispro (ADMELOG) corrective regimen sliding scale   SubCutaneous every 6 hours  LORazepam     Tablet 1 milliGRAM(s) Oral every 4 hours PRN  midazolam Infusion 0.02 mG/kG/Hr IV Continuous <Continuous>  pantoprazole  Injectable 40 milliGRAM(s) IV Push daily  polyethylene glycol 3350 17 Gram(s) Oral daily  senna 2 Tablet(s) Oral at bedtime  sodium chloride 0.9% lock flush 10 milliLiter(s) IV Push every 1 hour PRN    acetaminophen    Suspension .. 650 milliGRAM(s) Oral <User Schedule>  ALBUTerol    90 MICROgram(s) HFA Inhaler 2 Puff(s) Inhalation every 6 hours  artificial  tears Solution 1 Drop(s) Both EYES every 4 hours PRN  ascorbic acid 1000 milliGRAM(s) Oral daily  aspirin  chewable 81 milliGRAM(s) Oral daily  chlorhexidine 0.12% Liquid 15 milliLiter(s) Oral Mucosa two times a day  chlorhexidine 2% Cloths 1 Application(s) Topical <User Schedule>  enoxaparin Injectable 40 milliGRAM(s) SubCutaneous daily  fentaNYL   Infusion 4 MICROgram(s)/kG/Hr IV Continuous <Continuous>  furosemide   Injectable 40 milliGRAM(s) IV Push two times a day  hydrochlorothiazide 50 milliGRAM(s) Enteral Tube daily  ibuprofen  Suspension. 600 milliGRAM(s) Enteral Tube <User Schedule>  insulin lispro (ADMELOG) corrective regimen sliding scale   SubCutaneous every 6 hours  LORazepam     Tablet 1 milliGRAM(s) Oral every 4 hours PRN  meropenem  IVPB 1000 milliGRAM(s) IV Intermittent every 8 hours  meropenem  IVPB      midazolam Infusion 0.02 mG/kG/Hr IV Continuous <Continuous>  midodrine 5 milliGRAM(s) Oral every 8 hours  pantoprazole  Injectable 40 milliGRAM(s) IV Push daily  polyethylene glycol 3350 17 Gram(s) Oral daily  senna 2 Tablet(s) Oral at bedtime  sodium chloride 0.9% lock flush 10 milliLiter(s) IV Push every 1 hour PRN    Drug Dosing Weight  Height (cm): 167.6 (14 Mar 2021 12:03)  Weight (kg): 83.869 (14 Mar 2021 12:03)  BMI (kg/m2): 29.9 (14 Mar 2021 12:03)  BSA (m2): 1.93 (14 Mar 2021 12:03)    CENTRAL LINE: [ ] YES [ ] NO  LOCATION:   DATE INSERTED:  REMOVE: [ ] YES [ ] NO  EXPLAIN:    RIVERA: [ ] YES [ ] NO    DATE INSERTED:  REMOVE:  [ ] YES [ ] NO  EXPLAIN:    A-LINE:  [ ] YES [ ] NO  LOCATION:   DATE INSERTED:  REMOVE:  [ ] YES [ ] NO  EXPLAIN:    PMH -reviewed admission note, no change since admission      ABG - ( 17 Apr 2021 04:16 )  pH, Arterial: 7.40  pH, Blood: x     /  pCO2: 89    /  pO2: 82    / HCO3: 55    / Base Excess: 24.5  /  SaO2: 99                    04-15 @ 07:01  -  04-16 @ 07:00  --------------------------------------------------------  IN: 2883 mL / OUT: 4000 mL / NET: -1117 mL        Mode: AC/ CMV (Assist Control/ Continuous Mandatory Ventilation)  RR (machine): 30  TV (machine): 400  FiO2: 60  PEEP: 8  ITime: 0.8  MAP: 19  PIP: 40      PHYSICAL EXAM:    GENERAL: NAD, on Vent, sedated  HEAD: Normocephalic, atraumatic  EYES:  Dry eyes, conjunctivae/sclera clear, Unequal pupils  MOUTH : ET Tube  NECK: Supple,   LIJ, No JVD; Normal thyroid; Trachea midline no lymphadenopathy   NERVOUS SYSTEM:  sedated  CHEST/LUNG: B/L crackles (R>L), equal lung expansion  HEART: Regular rate and rhythm; No murmurs, rubs, or gallops, tachycardia  ABDOMEN: Soft, Nontender, Nondistended; Bowel sounds present  EXTREMITIES:  b/l upper extremities edema +2,  no edema on lower legs. No clubbing, cyanosis   SKIN: No rashes  + capillary refill <2 sec      LABS:  CBC Full  -  ( 16 Apr 2021 17:25 )  WBC Count : 8.02 K/uL  RBC Count : 2.55 M/uL  Hemoglobin : 8.1 g/dL  Hematocrit : 26.7 %  Platelet Count - Automated : 415 K/uL  Mean Cell Volume : 104.7 fl  Mean Cell Hemoglobin : 31.8 pg  Mean Cell Hemoglobin Concentration : 30.3 gm/dL  Auto Neutrophil # : x  Auto Lymphocyte # : x  Auto Monocyte # : x  Auto Eosinophil # : x  Auto Basophil # : x  Auto Neutrophil % : x  Auto Lymphocyte % : x  Auto Monocyte % : x  Auto Eosinophil % : x  Auto Basophil % : x    04-16    147<H>  |  102  |  20<H>  ----------------------------<  128<H>  3.7   |  45<HH>  |  0.42<L>    Ca    8.5      16 Apr 2021 17:25  Phos  2.0     04-16  Mg     2.1     04-16    TPro  5.7<L>  /  Alb  2.5<L>  /  TBili  0.6  /  DBili  x   /  AST  29  /  ALT  31  /  AlkPhos  72  04-16            RADIOLOGY & ADDITIONAL STUDIES REVIEWED:  ***    [ ]GOALS OF CARE DISCUSSION WITH PATIENT/FAMILY/PROXY:    CRITICAL CARE TIME SPENT: 35 minutes

## 2021-04-17 NOTE — PROGRESS NOTE ADULT - SUBJECTIVE AND OBJECTIVE BOX
Patient is a 55y old  Male who presents with a chief complaint of SOB (17 Apr 2021 05:52)    pt seen in icu [ x ], reg med floor [   ], bed [ x ], chair at bedside [   ], a+o x3 [  ], sedated [x  ],  nad [x  ]    andrea [ x ], ngt feed [x  ], et tube [ x ], cent line [x  ],        Allergies    No Known Allergies        Vitals    T(F): 98 (04-17-21 @ 05:00), Max: 100.9 (04-16-21 @ 11:00)  HR: 95 (04-17-21 @ 06:00) (93 - 132)  BP: 116/58 (04-17-21 @ 06:00) (93/49 - 135/64)  RR: 29 (04-17-21 @ 06:00) (20 - 33)  SpO2: 95% (04-17-21 @ 06:00) (89% - 96%)  Wt(kg): --  CAPILLARY BLOOD GLUCOSE      POCT Blood Glucose.: 102 mg/dL (17 Apr 2021 05:09)      Labs                          7.8    8.49  )-----------( 472      ( 17 Apr 2021 05:59 )             25.6       04-17    145  |  101  |  20<H>  ----------------------------<  95  3.9   |  x   |  0.43<L>    Ca    8.6      17 Apr 2021 05:59  Phos  2.9     04-17  Mg     2.2     04-17    TPro  6.1  /  Alb  3.1<L>  /  TBili  0.5  /  DBili  x   /  AST  33  /  ALT  32  /  AlkPhos  79  04-17            .Sputum Sputum  04-16 @ 04:42 --  --    Few polymorphonuclear leukocytes per low power field  Rare Squamous epithelial cells per low power field  Few Gram Positive Rods seen per oil power field      .Urine Clean Catch (Midstream)  03-15 @ 00:52   No growth  --  --          Radiology Results      Meds    MEDICATIONS  (STANDING):  acetaminophen    Suspension .. 650 milliGRAM(s) Oral <User Schedule>  ALBUTerol    90 MICROgram(s) HFA Inhaler 2 Puff(s) Inhalation every 6 hours  ascorbic acid 1000 milliGRAM(s) Oral daily  aspirin  chewable 81 milliGRAM(s) Oral daily  chlorhexidine 0.12% Liquid 15 milliLiter(s) Oral Mucosa two times a day  chlorhexidine 2% Cloths 1 Application(s) Topical <User Schedule>  enoxaparin Injectable 40 milliGRAM(s) SubCutaneous daily  fentaNYL   Infusion 4 MICROgram(s)/kG/Hr (33.5 mL/Hr) IV Continuous <Continuous>  furosemide   Injectable 40 milliGRAM(s) IV Push two times a day  hydrochlorothiazide 50 milliGRAM(s) Enteral Tube daily  ibuprofen  Suspension. 600 milliGRAM(s) Enteral Tube <User Schedule>  insulin lispro (ADMELOG) corrective regimen sliding scale   SubCutaneous every 6 hours  meropenem  IVPB 1000 milliGRAM(s) IV Intermittent every 8 hours  meropenem  IVPB      midazolam Infusion 0.02 mG/kG/Hr (1.68 mL/Hr) IV Continuous <Continuous>  midodrine 5 milliGRAM(s) Oral every 8 hours  pantoprazole  Injectable 40 milliGRAM(s) IV Push daily  polyethylene glycol 3350 17 Gram(s) Oral daily  senna 2 Tablet(s) Oral at bedtime      MEDICATIONS  (PRN):  artificial  tears Solution 1 Drop(s) Both EYES every 4 hours PRN Dry Eyes  LORazepam     Tablet 1 milliGRAM(s) Oral every 4 hours PRN Agitation  sodium chloride 0.9% lock flush 10 milliLiter(s) IV Push every 1 hour PRN Pre/post blood products, medications, blood draw, and to maintain line patency      Physical Exam    Neuro :  no focal deficits  Respiratory: CTA B/L  CV: RRR, S1S2, no murmurs,   Abdominal: Soft, NT, ND +BS,  Extremities: No edema, + peripheral pulses      ASSESSMENT    Hypoxemia 2nd to covid pna   transaminitis  prediabetes  h/o appendectomy  cholecystectomy        PLAN    contact and airborne isolation  d/c remdesevir given covid ab positive noted   completed dexamethasone   started pulse steroids for 3 days - 250mg solumedrol bid now tapered off 4/14/21  cont asa, vit c,    cont albuterol inhaler   pulm f/u  procalcitonin, D-dimer, crp, ldh, ferritin, lactate noted ,    tmx 100.7  cont tylenol prn,   cont robitussin prn   pt on fentanyl, phenylephrine, midazolam drip  s/p intubation 3/29/21  O2 sat (89% - 100%) mech vent  O2 via mech vent and taper fio2 as tolerated   serial abg's   vent mgmt as per icu  xray 3/19/21 with pneumomediastinum  rept cxr with New trace right apical pneumothorax. New mild left apical pneumothorax. Grossly stable small pneumomediastinum.  Soft tissue emphysema at the neck bases bilaterally. Grossly stable bilateral pulmonary infiltrates noted.   cxr 2/24 with No evidence of pneumothorax can be appreciated on the available image. This may be related to patient positioning. Evidence of pneumomediastinum and subcutaneous emphysema in the lower neck is again noted. There are patchy bibasilar infiltrates and elevated right hemidiaphragm noted.   cxr 3/29/21 with No significant change bilateral infiltrates. There is a small simple left apical pneumothorax. No significant pleural effusion. Bilateral subcutaneous emphysema similar to prior.   pt intubated 6AM 3/29/21,   XRs on 7AM and 5PM with no obvious increase of ptx  cxr 4/4/21 with Improving bilateral airspace disease noted    cxr 4/8/21 with Small left pneumothorax noted.  thoracic surg f/u   s/p L chest tube placement  CXR 4/11/21 with : No interval change compared to one day prior. No pneumothorax noted above.   Daily CXR  Monitor O2 status   pt for possible trach cext week if fio2 <50%  id f/u   Continue Meropenem 1g iv q8  f/u blood cx  andrea d/c'd  lispro ss   prognosis poor  cont current meds       Patient is a 55y old  Male who presents with a chief complaint of SOB (17 Apr 2021 05:52)    pt seen in icu [ x ], reg med floor [   ], bed [ x ], chair at bedside [   ], a+o x3 [  ], sedated [x  ],  nad [x  ]    andrea [ x ], ngt feed [x  ], et tube [ x ], cent line [x  ],        Allergies    No Known Allergies        Vitals    T(F): 98 (04-17-21 @ 05:00), Max: 100.9 (04-16-21 @ 11:00)  HR: 95 (04-17-21 @ 06:00) (93 - 132)  BP: 116/58 (04-17-21 @ 06:00) (93/49 - 135/64)  RR: 29 (04-17-21 @ 06:00) (20 - 33)  SpO2: 95% (04-17-21 @ 06:00) (89% - 96%)  Wt(kg): --  CAPILLARY BLOOD GLUCOSE      POCT Blood Glucose.: 102 mg/dL (17 Apr 2021 05:09)      Labs                          7.8    8.49  )-----------( 472      ( 17 Apr 2021 05:59 )             25.6       04-17    145  |  101  |  20<H>  ----------------------------<  95  3.9   |  x   |  0.43<L>    Ca    8.6      17 Apr 2021 05:59  Phos  2.9     04-17  Mg     2.2     04-17    TPro  6.1  /  Alb  3.1<L>  /  TBili  0.5  /  DBili  x   /  AST  33  /  ALT  32  /  AlkPhos  79  04-17            .Sputum Sputum  04-16 @ 04:42 --  --    Few polymorphonuclear leukocytes per low power field  Rare Squamous epithelial cells per low power field  Few Gram Positive Rods seen per oil power field      .Urine Clean Catch (Midstream)  03-15 @ 00:52   No growth  --  --          Radiology Results      Meds    MEDICATIONS  (STANDING):  acetaminophen    Suspension .. 650 milliGRAM(s) Oral <User Schedule>  ALBUTerol    90 MICROgram(s) HFA Inhaler 2 Puff(s) Inhalation every 6 hours  ascorbic acid 1000 milliGRAM(s) Oral daily  aspirin  chewable 81 milliGRAM(s) Oral daily  chlorhexidine 0.12% Liquid 15 milliLiter(s) Oral Mucosa two times a day  chlorhexidine 2% Cloths 1 Application(s) Topical <User Schedule>  enoxaparin Injectable 40 milliGRAM(s) SubCutaneous daily  fentaNYL   Infusion 4 MICROgram(s)/kG/Hr (33.5 mL/Hr) IV Continuous <Continuous>  furosemide   Injectable 40 milliGRAM(s) IV Push two times a day  hydrochlorothiazide 50 milliGRAM(s) Enteral Tube daily  ibuprofen  Suspension. 600 milliGRAM(s) Enteral Tube <User Schedule>  insulin lispro (ADMELOG) corrective regimen sliding scale   SubCutaneous every 6 hours  meropenem  IVPB 1000 milliGRAM(s) IV Intermittent every 8 hours  meropenem  IVPB      midazolam Infusion 0.02 mG/kG/Hr (1.68 mL/Hr) IV Continuous <Continuous>  midodrine 5 milliGRAM(s) Oral every 8 hours  pantoprazole  Injectable 40 milliGRAM(s) IV Push daily  polyethylene glycol 3350 17 Gram(s) Oral daily  senna 2 Tablet(s) Oral at bedtime      MEDICATIONS  (PRN):  artificial  tears Solution 1 Drop(s) Both EYES every 4 hours PRN Dry Eyes  LORazepam     Tablet 1 milliGRAM(s) Oral every 4 hours PRN Agitation  sodium chloride 0.9% lock flush 10 milliLiter(s) IV Push every 1 hour PRN Pre/post blood products, medications, blood draw, and to maintain line patency      Physical Exam    Neuro :  no focal deficits  Respiratory: CTA B/L  CV: RRR, S1S2, no murmurs,   Abdominal: Soft, NT, ND +BS,  Extremities: No edema, + peripheral pulses      ASSESSMENT    Hypoxemia 2nd to covid pna   transaminitis  prediabetes  h/o appendectomy  cholecystectomy        PLAN    contact and airborne isolation  d/c remdesevir given covid ab positive noted   completed dexamethasone   started pulse steroids for 3 days - 250mg solumedrol bid now tapered off 4/14/21  cont asa, vit c,    cont albuterol inhaler   pulm f/u  procalcitonin, D-dimer, crp, ldh, ferritin, lactate noted ,    tmx 100.7  cont tylenol prn,   cont robitussin prn   pt on fentanyl, phenylephrine, midazolam drip  s/p intubation 3/29/21  O2 sat (89% - 96%) mech vent 60%  O2 via mech vent and taper fio2 as tolerated   vent mgmt as per icu  xray 3/19/21 with pneumomediastinum  rept cxr with New trace right apical pneumothorax. New mild left apical pneumothorax. Grossly stable small pneumomediastinum.  Soft tissue emphysema at the neck bases bilaterally. Grossly stable bilateral pulmonary infiltrates noted.   cxr 2/24 with No evidence of pneumothorax can be appreciated on the available image. This may be related to patient positioning. Evidence of pneumomediastinum and subcutaneous emphysema in the lower neck is again noted. There are patchy bibasilar infiltrates and elevated right hemidiaphragm noted.   cxr 3/29/21 with No significant change bilateral infiltrates. There is a small simple left apical pneumothorax. No significant pleural effusion. Bilateral subcutaneous emphysema similar to prior.   pt intubated 6AM 3/29/21,   XRs on 7AM and 5PM with no obvious increase of ptx  cxr 4/4/21 with Improving bilateral airspace disease noted    cxr 4/8/21 with Small left pneumothorax noted.  thoracic surg f/u   s/p L chest tube placement  CXR 4/11/21 with : No interval change compared to one day prior. No pneumothorax noted above.   Daily CXR  Monitor O2 status   pt for possible trach next week if fio2 <50%  id f/u   Continue Meropenem 1g iv q8  f/u blood cx  andrea d/c'd  lispro ss   prognosis poor  cont current meds

## 2021-04-18 LAB
-  AMIKACIN: SIGNIFICANT CHANGE UP
-  AMOXICILLIN/CLAVULANIC ACID: SIGNIFICANT CHANGE UP
-  AMPICILLIN/SULBACTAM: SIGNIFICANT CHANGE UP
-  AMPICILLIN: SIGNIFICANT CHANGE UP
-  AZTREONAM: SIGNIFICANT CHANGE UP
-  CEFAZOLIN: SIGNIFICANT CHANGE UP
-  CEFEPIME: SIGNIFICANT CHANGE UP
-  CEFOXITIN: SIGNIFICANT CHANGE UP
-  CEFTAZIDIME/AVIBACTAM: SIGNIFICANT CHANGE UP
-  CEFTRIAXONE: SIGNIFICANT CHANGE UP
-  CIPROFLOXACIN: SIGNIFICANT CHANGE UP
-  ERTAPENEM: SIGNIFICANT CHANGE UP
-  GENTAMICIN: SIGNIFICANT CHANGE UP
-  IMIPENEM: SIGNIFICANT CHANGE UP
-  LEVOFLOXACIN: SIGNIFICANT CHANGE UP
-  MEROPENEM: SIGNIFICANT CHANGE UP
-  PIPERACILLIN/TAZOBACTAM: SIGNIFICANT CHANGE UP
-  TOBRAMYCIN: SIGNIFICANT CHANGE UP
-  TRIMETHOPRIM/SULFAMETHOXAZOLE: SIGNIFICANT CHANGE UP
ALBUMIN SERPL ELPH-MCNC: 2.8 G/DL — LOW (ref 3.5–5)
ALP SERPL-CCNC: 86 U/L — SIGNIFICANT CHANGE UP (ref 40–120)
ALT FLD-CCNC: 35 U/L DA — SIGNIFICANT CHANGE UP (ref 10–60)
ANION GAP SERPL CALC-SCNC: 14 MMOL/L — SIGNIFICANT CHANGE UP (ref 5–17)
ANISOCYTOSIS BLD QL: SLIGHT — SIGNIFICANT CHANGE UP
AST SERPL-CCNC: 28 U/L — SIGNIFICANT CHANGE UP (ref 10–40)
BASE EXCESS BLDA CALC-SCNC: 25.5 MMOL/L — HIGH (ref -2–3)
BASOPHILS # BLD AUTO: 0.12 K/UL — SIGNIFICANT CHANGE UP (ref 0–0.2)
BASOPHILS NFR BLD AUTO: 1 % — SIGNIFICANT CHANGE UP (ref 0–2)
BILIRUB SERPL-MCNC: 0.5 MG/DL — SIGNIFICANT CHANGE UP (ref 0.2–1.2)
BLOOD GAS COMMENTS ARTERIAL: SIGNIFICANT CHANGE UP
BUN SERPL-MCNC: 25 MG/DL — HIGH (ref 7–18)
CALCIUM SERPL-MCNC: 8.8 MG/DL — SIGNIFICANT CHANGE UP (ref 8.4–10.5)
CHLORIDE SERPL-SCNC: 95 MMOL/L — LOW (ref 96–108)
CO2 SERPL-SCNC: 36 MMOL/L — HIGH (ref 22–31)
CREAT SERPL-MCNC: 0.6 MG/DL — SIGNIFICANT CHANGE UP (ref 0.5–1.3)
CRP SERPL-MCNC: 138 MG/L — HIGH
CULTURE RESULTS: SIGNIFICANT CHANGE UP
D DIMER BLD IA.RAPID-MCNC: 1778 NG/ML DDU — HIGH
EOSINOPHIL # BLD AUTO: 0.12 K/UL — SIGNIFICANT CHANGE UP (ref 0–0.5)
EOSINOPHIL NFR BLD AUTO: 1 % — SIGNIFICANT CHANGE UP (ref 0–6)
GLUCOSE BLDC GLUCOMTR-MCNC: 100 MG/DL — HIGH (ref 70–99)
GLUCOSE BLDC GLUCOMTR-MCNC: 117 MG/DL — HIGH (ref 70–99)
GLUCOSE SERPL-MCNC: 98 MG/DL — SIGNIFICANT CHANGE UP (ref 70–99)
HCO3 BLDA-SCNC: 57 MMOL/L — CRITICAL HIGH (ref 21–28)
HCT VFR BLD CALC: 29.2 % — LOW (ref 39–50)
HGB BLD-MCNC: 8.7 G/DL — LOW (ref 13–17)
HOROWITZ INDEX BLDA+IHG-RTO: 70 — SIGNIFICANT CHANGE UP
LYMPHOCYTES # BLD AUTO: 1.98 K/UL — SIGNIFICANT CHANGE UP (ref 1–3.3)
LYMPHOCYTES # BLD AUTO: 17 % — SIGNIFICANT CHANGE UP (ref 13–44)
MACROCYTES BLD QL: SLIGHT — SIGNIFICANT CHANGE UP
MAGNESIUM SERPL-MCNC: 2.5 MG/DL — SIGNIFICANT CHANGE UP (ref 1.6–2.6)
MANUAL SMEAR VERIFICATION: SIGNIFICANT CHANGE UP
MCHC RBC-ENTMCNC: 29.8 GM/DL — LOW (ref 32–36)
MCHC RBC-ENTMCNC: 31.9 PG — SIGNIFICANT CHANGE UP (ref 27–34)
MCV RBC AUTO: 107 FL — HIGH (ref 80–100)
METHOD TYPE: SIGNIFICANT CHANGE UP
MONOCYTES # BLD AUTO: 0.46 K/UL — SIGNIFICANT CHANGE UP (ref 0–0.9)
MONOCYTES NFR BLD AUTO: 4 % — SIGNIFICANT CHANGE UP (ref 2–14)
MYELOCYTES NFR BLD: 6 % — HIGH (ref 0–0)
NEUTROPHILS # BLD AUTO: 8.13 K/UL — HIGH (ref 1.8–7.4)
NEUTROPHILS NFR BLD AUTO: 51 % — SIGNIFICANT CHANGE UP (ref 43–77)
NEUTS BAND # BLD: 19 % — HIGH (ref 0–8)
NRBC # BLD: 0 /100 — SIGNIFICANT CHANGE UP (ref 0–0)
ORGANISM # SPEC MICROSCOPIC CNT: SIGNIFICANT CHANGE UP
ORGANISM # SPEC MICROSCOPIC CNT: SIGNIFICANT CHANGE UP
PCO2 BLDA: 96 MMHG — CRITICAL HIGH (ref 35–48)
PH BLDA: 7.38 — SIGNIFICANT CHANGE UP (ref 7.35–7.45)
PHOSPHATE SERPL-MCNC: 4.3 MG/DL — SIGNIFICANT CHANGE UP (ref 2.5–4.5)
PLAT MORPH BLD: NORMAL — SIGNIFICANT CHANGE UP
PLATELET # BLD AUTO: 556 K/UL — HIGH (ref 150–400)
PO2 BLDA: 67 MMHG — LOW (ref 83–108)
POLYCHROMASIA BLD QL SMEAR: SLIGHT — SIGNIFICANT CHANGE UP
POTASSIUM SERPL-MCNC: 3.9 MMOL/L — SIGNIFICANT CHANGE UP (ref 3.5–5.3)
POTASSIUM SERPL-SCNC: 3.9 MMOL/L — SIGNIFICANT CHANGE UP (ref 3.5–5.3)
PROCALCITONIN SERPL-MCNC: 0.42 NG/ML — HIGH (ref 0.02–0.1)
PROT SERPL-MCNC: 6.4 G/DL — SIGNIFICANT CHANGE UP (ref 6–8.3)
RBC # BLD: 2.73 M/UL — LOW (ref 4.2–5.8)
RBC # FLD: 15.1 % — HIGH (ref 10.3–14.5)
RBC BLD AUTO: ABNORMAL
SAO2 % BLDA: 95 % — SIGNIFICANT CHANGE UP
SARS-COV-2 RNA SPEC QL NAA+PROBE: SIGNIFICANT CHANGE UP
SODIUM SERPL-SCNC: 145 MMOL/L — SIGNIFICANT CHANGE UP (ref 135–145)
SPECIMEN SOURCE: SIGNIFICANT CHANGE UP
VARIANT LYMPHS # BLD: 1 % — SIGNIFICANT CHANGE UP (ref 0–6)
WBC # BLD: 11.62 K/UL — HIGH (ref 3.8–10.5)
WBC # FLD AUTO: 11.62 K/UL — HIGH (ref 3.8–10.5)

## 2021-04-18 PROCEDURE — 71045 X-RAY EXAM CHEST 1 VIEW: CPT | Mod: 26

## 2021-04-18 PROCEDURE — 71045 X-RAY EXAM CHEST 1 VIEW: CPT | Mod: 26,77

## 2021-04-18 RX ORDER — CEFEPIME 1 G/1
1000 INJECTION, POWDER, FOR SOLUTION INTRAMUSCULAR; INTRAVENOUS ONCE
Refills: 0 | Status: COMPLETED | OUTPATIENT
Start: 2021-04-18 | End: 2021-04-18

## 2021-04-18 RX ORDER — ACETAZOLAMIDE 250 MG/1
500 TABLET ORAL ONCE
Refills: 0 | Status: COMPLETED | OUTPATIENT
Start: 2021-04-18 | End: 2021-04-18

## 2021-04-18 RX ORDER — FUROSEMIDE 40 MG
40 TABLET ORAL DAILY
Refills: 0 | Status: DISCONTINUED | OUTPATIENT
Start: 2021-04-18 | End: 2021-04-19

## 2021-04-18 RX ORDER — CEFEPIME 1 G/1
1000 INJECTION, POWDER, FOR SOLUTION INTRAMUSCULAR; INTRAVENOUS EVERY 12 HOURS
Refills: 0 | Status: DISCONTINUED | OUTPATIENT
Start: 2021-04-18 | End: 2021-04-19

## 2021-04-18 RX ORDER — PHENYLEPHRINE HYDROCHLORIDE 10 MG/ML
1.5 INJECTION INTRAVENOUS
Qty: 160 | Refills: 0 | Status: DISCONTINUED | OUTPATIENT
Start: 2021-04-18 | End: 2021-04-23

## 2021-04-18 RX ORDER — FENTANYL CITRATE 50 UG/ML
3.99 INJECTION INTRAVENOUS
Qty: 2500 | Refills: 0 | Status: DISCONTINUED | OUTPATIENT
Start: 2021-04-18 | End: 2021-04-21

## 2021-04-18 RX ORDER — CEFEPIME 1 G/1
INJECTION, POWDER, FOR SOLUTION INTRAMUSCULAR; INTRAVENOUS
Refills: 0 | Status: DISCONTINUED | OUTPATIENT
Start: 2021-04-18 | End: 2021-04-19

## 2021-04-18 RX ORDER — PHENYLEPHRINE HYDROCHLORIDE 10 MG/ML
0.1 INJECTION INTRAVENOUS
Qty: 160 | Refills: 0 | Status: DISCONTINUED | OUTPATIENT
Start: 2021-04-18 | End: 2021-04-18

## 2021-04-18 RX ADMIN — ALBUTEROL 2 PUFF(S): 90 AEROSOL, METERED ORAL at 09:00

## 2021-04-18 RX ADMIN — MIDODRINE HYDROCHLORIDE 5 MILLIGRAM(S): 2.5 TABLET ORAL at 22:16

## 2021-04-18 RX ADMIN — PHENYLEPHRINE HYDROCHLORIDE 1.57 MICROGRAM(S)/KG/MIN: 10 INJECTION INTRAVENOUS at 04:37

## 2021-04-18 RX ADMIN — ENOXAPARIN SODIUM 40 MILLIGRAM(S): 100 INJECTION SUBCUTANEOUS at 11:20

## 2021-04-18 RX ADMIN — Medication 1 MILLIGRAM(S): at 17:18

## 2021-04-18 RX ADMIN — Medication 650 MILLIGRAM(S): at 22:16

## 2021-04-18 RX ADMIN — ALBUTEROL 2 PUFF(S): 90 AEROSOL, METERED ORAL at 05:06

## 2021-04-18 RX ADMIN — FENTANYL CITRATE 33.5 MICROGRAM(S)/KG/HR: 50 INJECTION INTRAVENOUS at 14:37

## 2021-04-18 RX ADMIN — MIDODRINE HYDROCHLORIDE 5 MILLIGRAM(S): 2.5 TABLET ORAL at 13:18

## 2021-04-18 RX ADMIN — PANTOPRAZOLE SODIUM 40 MILLIGRAM(S): 20 TABLET, DELAYED RELEASE ORAL at 11:21

## 2021-04-18 RX ADMIN — Medication 650 MILLIGRAM(S): at 05:00

## 2021-04-18 RX ADMIN — ALBUTEROL 2 PUFF(S): 90 AEROSOL, METERED ORAL at 20:51

## 2021-04-18 RX ADMIN — CHLORHEXIDINE GLUCONATE 15 MILLILITER(S): 213 SOLUTION TOPICAL at 05:12

## 2021-04-18 RX ADMIN — MEROPENEM 100 MILLIGRAM(S): 1 INJECTION INTRAVENOUS at 05:12

## 2021-04-18 RX ADMIN — SENNA PLUS 2 TABLET(S): 8.6 TABLET ORAL at 22:16

## 2021-04-18 RX ADMIN — FENTANYL CITRATE 33.5 MICROGRAM(S)/KG/HR: 50 INJECTION INTRAVENOUS at 08:12

## 2021-04-18 RX ADMIN — ACETAZOLAMIDE 110 MILLIGRAM(S): 250 TABLET ORAL at 05:17

## 2021-04-18 RX ADMIN — Medication 650 MILLIGRAM(S): at 11:21

## 2021-04-18 RX ADMIN — Medication 1 MILLIGRAM(S): at 11:20

## 2021-04-18 RX ADMIN — Medication 600 MILLIGRAM(S): at 00:00

## 2021-04-18 RX ADMIN — Medication 40 MILLIGRAM(S): at 05:12

## 2021-04-18 RX ADMIN — FENTANYL CITRATE 33.5 MICROGRAM(S)/KG/HR: 50 INJECTION INTRAVENOUS at 02:00

## 2021-04-18 RX ADMIN — Medication 81 MILLIGRAM(S): at 11:20

## 2021-04-18 RX ADMIN — Medication 1000 MILLIGRAM(S): at 11:20

## 2021-04-18 RX ADMIN — MIDAZOLAM HYDROCHLORIDE 1.68 MG/KG/HR: 1 INJECTION, SOLUTION INTRAMUSCULAR; INTRAVENOUS at 21:36

## 2021-04-18 RX ADMIN — CHLORHEXIDINE GLUCONATE 15 MILLILITER(S): 213 SOLUTION TOPICAL at 17:18

## 2021-04-18 RX ADMIN — Medication 650 MILLIGRAM(S): at 04:28

## 2021-04-18 RX ADMIN — CEFEPIME 100 MILLIGRAM(S): 1 INJECTION, POWDER, FOR SOLUTION INTRAMUSCULAR; INTRAVENOUS at 17:18

## 2021-04-18 RX ADMIN — Medication 50 MILLIGRAM(S): at 05:12

## 2021-04-18 RX ADMIN — MIDAZOLAM HYDROCHLORIDE 1.68 MG/KG/HR: 1 INJECTION, SOLUTION INTRAMUSCULAR; INTRAVENOUS at 07:46

## 2021-04-18 RX ADMIN — CHLORHEXIDINE GLUCONATE 1 APPLICATION(S): 213 SOLUTION TOPICAL at 05:12

## 2021-04-18 RX ADMIN — CEFEPIME 100 MILLIGRAM(S): 1 INJECTION, POWDER, FOR SOLUTION INTRAMUSCULAR; INTRAVENOUS at 12:34

## 2021-04-18 RX ADMIN — POLYETHYLENE GLYCOL 3350 17 GRAM(S): 17 POWDER, FOR SOLUTION ORAL at 11:22

## 2021-04-18 RX ADMIN — MIDAZOLAM HYDROCHLORIDE 1.68 MG/KG/HR: 1 INJECTION, SOLUTION INTRAMUSCULAR; INTRAVENOUS at 00:00

## 2021-04-18 RX ADMIN — MIDODRINE HYDROCHLORIDE 5 MILLIGRAM(S): 2.5 TABLET ORAL at 05:12

## 2021-04-18 RX ADMIN — ALBUTEROL 2 PUFF(S): 90 AEROSOL, METERED ORAL at 15:06

## 2021-04-18 RX ADMIN — Medication 1: at 12:34

## 2021-04-18 RX ADMIN — PHENYLEPHRINE HYDROCHLORIDE 23.6 MICROGRAM(S)/KG/MIN: 10 INJECTION INTRAVENOUS at 22:52

## 2021-04-18 RX ADMIN — Medication 650 MILLIGRAM(S): at 23:40

## 2021-04-18 RX ADMIN — MIDAZOLAM HYDROCHLORIDE 1.68 MG/KG/HR: 1 INJECTION, SOLUTION INTRAMUSCULAR; INTRAVENOUS at 14:37

## 2021-04-18 RX ADMIN — Medication 650 MILLIGRAM(S): at 12:32

## 2021-04-18 NOTE — PROGRESS NOTE ADULT - SUBJECTIVE AND OBJECTIVE BOX
INTERVAL HPI/OVERNIGHT EVENTS: BP running on lower side, Started on pheny, Ibuprofen D/Connor , FiO2 increased to 70%    PRESSORS: [ x] YES [ ] NO  WHICH: Phenylephrine     ANTIBIOTICS:                  DATE STARTED:  ANTIBIOTICS:                  DATE STARTED:  ANTIBIOTICS:                  DATE STARTED:    Antimicrobial:  meropenem  IVPB 1000 milliGRAM(s) IV Intermittent every 8 hours  meropenem  IVPB        Cardiovascular:  furosemide   Injectable 40 milliGRAM(s) IV Push two times a day  hydrochlorothiazide 50 milliGRAM(s) Enteral Tube daily  midodrine 5 milliGRAM(s) Oral every 8 hours  phenylephrine    Infusion 0.1 MICROgram(s)/kG/Min IV Continuous <Continuous>    Pulmonary:  ALBUTerol    90 MICROgram(s) HFA Inhaler 2 Puff(s) Inhalation every 6 hours    Hematalogic:  aspirin  chewable 81 milliGRAM(s) Oral daily  enoxaparin Injectable 40 milliGRAM(s) SubCutaneous daily    Other:  acetaminophen    Suspension .. 650 milliGRAM(s) Oral <User Schedule>  artificial  tears Solution 1 Drop(s) Both EYES every 4 hours PRN  ascorbic acid 1000 milliGRAM(s) Oral daily  chlorhexidine 0.12% Liquid 15 milliLiter(s) Oral Mucosa two times a day  chlorhexidine 2% Cloths 1 Application(s) Topical <User Schedule>  fentaNYL   Infusion 3.994 MICROgram(s)/kG/Hr IV Continuous <Continuous>  insulin lispro (ADMELOG) corrective regimen sliding scale   SubCutaneous every 6 hours  LORazepam     Tablet 1 milliGRAM(s) Oral every 4 hours PRN  midazolam Infusion 0.02 mG/kG/Hr IV Continuous <Continuous>  pantoprazole  Injectable 40 milliGRAM(s) IV Push daily  polyethylene glycol 3350 17 Gram(s) Oral daily  senna 2 Tablet(s) Oral at bedtime  sodium chloride 0.9% lock flush 10 milliLiter(s) IV Push every 1 hour PRN    acetaminophen    Suspension .. 650 milliGRAM(s) Oral <User Schedule>  ALBUTerol    90 MICROgram(s) HFA Inhaler 2 Puff(s) Inhalation every 6 hours  artificial  tears Solution 1 Drop(s) Both EYES every 4 hours PRN  ascorbic acid 1000 milliGRAM(s) Oral daily  aspirin  chewable 81 milliGRAM(s) Oral daily  chlorhexidine 0.12% Liquid 15 milliLiter(s) Oral Mucosa two times a day  chlorhexidine 2% Cloths 1 Application(s) Topical <User Schedule>  enoxaparin Injectable 40 milliGRAM(s) SubCutaneous daily  fentaNYL   Infusion 3.994 MICROgram(s)/kG/Hr IV Continuous <Continuous>  furosemide   Injectable 40 milliGRAM(s) IV Push two times a day  hydrochlorothiazide 50 milliGRAM(s) Enteral Tube daily  insulin lispro (ADMELOG) corrective regimen sliding scale   SubCutaneous every 6 hours  LORazepam     Tablet 1 milliGRAM(s) Oral every 4 hours PRN  meropenem  IVPB 1000 milliGRAM(s) IV Intermittent every 8 hours  meropenem  IVPB      midazolam Infusion 0.02 mG/kG/Hr IV Continuous <Continuous>  midodrine 5 milliGRAM(s) Oral every 8 hours  pantoprazole  Injectable 40 milliGRAM(s) IV Push daily  phenylephrine    Infusion 0.1 MICROgram(s)/kG/Min IV Continuous <Continuous>  polyethylene glycol 3350 17 Gram(s) Oral daily  senna 2 Tablet(s) Oral at bedtime  sodium chloride 0.9% lock flush 10 milliLiter(s) IV Push every 1 hour PRN    Drug Dosing Weight  Height (cm): 167.6 (14 Mar 2021 12:03)  Weight (kg): 83.869 (14 Mar 2021 12:03)  BMI (kg/m2): 29.9 (14 Mar 2021 12:03)  BSA (m2): 1.93 (14 Mar 2021 12:03)    CENTRAL LINE: [ x] YES [ ] NO  LOCATION: [facility]  DATE INSERTED: 4/13  REMOVE: [ ] YES [ ] NO  EXPLAIN:    RIVERA: [ ] YES [x ] NO    DATE INSERTED:  REMOVE:  [ ] YES [ ] NO  EXPLAIN:    A-LINE:  [ ] YES [x ] NO  LOCATION:   DATE INSERTED:  REMOVE:  [ ] YES [ ] NO  EXPLAIN:    PMH -reviewed admission note, no change since admission  PAST MEDICAL & SURGICAL HISTORY:  No pertinent past medical history    S/P moody  1990s    S/P appendectomy  1990s        ICU Vital Signs Last 24 Hrs  T(C): 36.7 (18 Apr 2021 01:00), Max: 37.7 (17 Apr 2021 22:00)  T(F): 98 (18 Apr 2021 01:00), Max: 99.8 (17 Apr 2021 22:00)  HR: 107 (18 Apr 2021 02:00) (82 - 133)  BP: 80/53 (18 Apr 2021 02:00) (80/53 - 135/64)  BP(mean): 60 (18 Apr 2021 02:00) (59 - 87)  RR: 30 (18 Apr 2021 02:00) (24 - 32)  SpO2: 97% (18 Apr 2021 02:00) (87% - 97%)      ABG - ( 17 Apr 2021 04:16 )  pH, Arterial: 7.40  pH, Blood: x     /  pCO2: 89    /  pO2: 82    / HCO3: 55    / Base Excess: 24.5  /  SaO2: 99                    04-16 @ 07:01  -  04-17 @ 07:00  --------------------------------------------------------  IN: 2395.1 mL / OUT: 2710 mL / NET: -314.9 mL        Mode: AC/ CMV (Assist Control/ Continuous Mandatory Ventilation)  RR (machine): 30  TV (machine): 400  FiO2: 60  PEEP: 8  ITime: 1  MAP: 15  PIP: 30      PHYSICAL EXAM:    GENERAL: NAD, on Vent, sedated  HEAD: Normocephalic, atraumatic  EYES:  Dry eyes, conjunctivae/sclera clear, Unequal pupils  MOUTH : ET Tube  NECK: Supple,   LIJ, No JVD; Normal thyroid; Trachea midline no lymphadenopathy   NERVOUS SYSTEM:  sedated  CHEST/LUNG: B/L crackles (R>L), equal lung expansion  HEART: Regular rate and rhythm; No murmurs, rubs, or gallops, tachycardia  ABDOMEN: Soft, Nontender, Nondistended; Bowel sounds present  EXTREMITIES:  b/l upper extremities edema +2,  no edema on lower legs. No clubbing, cyanosis   SKIN: No rashes  + capillary refill <2 sec          LABS:  CBC Full  -  ( 17 Apr 2021 05:59 )  WBC Count : 8.49 K/uL  RBC Count : 2.39 M/uL  Hemoglobin : 7.8 g/dL  Hematocrit : 25.6 %  Platelet Count - Automated : 472 K/uL  Mean Cell Volume : 107.1 fl  Mean Cell Hemoglobin : 32.6 pg  Mean Cell Hemoglobin Concentration : 30.5 gm/dL  Auto Neutrophil # : 6.15 K/uL  Auto Lymphocyte # : 0.95 K/uL  Auto Monocyte # : 0.23 K/uL  Auto Eosinophil # : 0.15 K/uL  Auto Basophil # : 0.03 K/uL  Auto Neutrophil % : 72.4 %  Auto Lymphocyte % : 11.2 %  Auto Monocyte % : 2.7 %  Auto Eosinophil % : 1.8 %  Auto Basophil % : 0.4 %    04-17    145  |  101  |  20<H>  ----------------------------<  95  3.9   |  28  |  0.43<L>    Ca    8.6      17 Apr 2021 05:59  Phos  2.9     04-17  Mg     2.2     04-17    TPro  6.1  /  Alb  3.1<L>  /  TBili  0.5  /  DBili  x   /  AST  33  /  ALT  32  /  AlkPhos  79  04-17        Culture Results:   Moderate Enterobacter aerogenes  Normal Respiratory Monserrat present (04-16 @ 04:42)      RADIOLOGY & ADDITIONAL STUDIES REVIEWED:  yes    [ ]GOALS OF CARE DISCUSSION WITH PATIENT/FAMILY/PROXY:    CRITICAL CARE TIME SPENT: 35 minutes INTERVAL HPI/OVERNIGHT EVENTS: BP running on lower side, Started on pheny, Ibuprofen D/Connor , FiO2 increased to 70%  Patient had large pneumothorax note don Left, thoracic surgery was consulted ,  left chest tube placed.    PRESSORS: [ x] YES [ ] NO  WHICH: Phenylephrine     ANTIBIOTICS:                  DATE STARTED:  ANTIBIOTICS:                  DATE STARTED:  ANTIBIOTICS:                  DATE STARTED:    Antimicrobial:  meropenem  IVPB 1000 milliGRAM(s) IV Intermittent every 8 hours  meropenem  IVPB        Cardiovascular:  furosemide   Injectable 40 milliGRAM(s) IV Push two times a day  hydrochlorothiazide 50 milliGRAM(s) Enteral Tube daily  midodrine 5 milliGRAM(s) Oral every 8 hours  phenylephrine    Infusion 0.1 MICROgram(s)/kG/Min IV Continuous <Continuous>    Pulmonary:  ALBUTerol    90 MICROgram(s) HFA Inhaler 2 Puff(s) Inhalation every 6 hours    Hematalogic:  aspirin  chewable 81 milliGRAM(s) Oral daily  enoxaparin Injectable 40 milliGRAM(s) SubCutaneous daily    Other:  acetaminophen    Suspension .. 650 milliGRAM(s) Oral <User Schedule>  artificial  tears Solution 1 Drop(s) Both EYES every 4 hours PRN  ascorbic acid 1000 milliGRAM(s) Oral daily  chlorhexidine 0.12% Liquid 15 milliLiter(s) Oral Mucosa two times a day  chlorhexidine 2% Cloths 1 Application(s) Topical <User Schedule>  fentaNYL   Infusion 3.994 MICROgram(s)/kG/Hr IV Continuous <Continuous>  insulin lispro (ADMELOG) corrective regimen sliding scale   SubCutaneous every 6 hours  LORazepam     Tablet 1 milliGRAM(s) Oral every 4 hours PRN  midazolam Infusion 0.02 mG/kG/Hr IV Continuous <Continuous>  pantoprazole  Injectable 40 milliGRAM(s) IV Push daily  polyethylene glycol 3350 17 Gram(s) Oral daily  senna 2 Tablet(s) Oral at bedtime  sodium chloride 0.9% lock flush 10 milliLiter(s) IV Push every 1 hour PRN    acetaminophen    Suspension .. 650 milliGRAM(s) Oral <User Schedule>  ALBUTerol    90 MICROgram(s) HFA Inhaler 2 Puff(s) Inhalation every 6 hours  artificial  tears Solution 1 Drop(s) Both EYES every 4 hours PRN  ascorbic acid 1000 milliGRAM(s) Oral daily  aspirin  chewable 81 milliGRAM(s) Oral daily  chlorhexidine 0.12% Liquid 15 milliLiter(s) Oral Mucosa two times a day  chlorhexidine 2% Cloths 1 Application(s) Topical <User Schedule>  enoxaparin Injectable 40 milliGRAM(s) SubCutaneous daily  fentaNYL   Infusion 3.994 MICROgram(s)/kG/Hr IV Continuous <Continuous>  furosemide   Injectable 40 milliGRAM(s) IV Push two times a day  hydrochlorothiazide 50 milliGRAM(s) Enteral Tube daily  insulin lispro (ADMELOG) corrective regimen sliding scale   SubCutaneous every 6 hours  LORazepam     Tablet 1 milliGRAM(s) Oral every 4 hours PRN  meropenem  IVPB 1000 milliGRAM(s) IV Intermittent every 8 hours  meropenem  IVPB      midazolam Infusion 0.02 mG/kG/Hr IV Continuous <Continuous>  midodrine 5 milliGRAM(s) Oral every 8 hours  pantoprazole  Injectable 40 milliGRAM(s) IV Push daily  phenylephrine    Infusion 0.1 MICROgram(s)/kG/Min IV Continuous <Continuous>  polyethylene glycol 3350 17 Gram(s) Oral daily  senna 2 Tablet(s) Oral at bedtime  sodium chloride 0.9% lock flush 10 milliLiter(s) IV Push every 1 hour PRN    Drug Dosing Weight  Height (cm): 167.6 (14 Mar 2021 12:03)  Weight (kg): 83.869 (14 Mar 2021 12:03)  BMI (kg/m2): 29.9 (14 Mar 2021 12:03)  BSA (m2): 1.93 (14 Mar 2021 12:03)    CENTRAL LINE: [ x] YES [ ] NO  LOCATION: [facility]  DATE INSERTED: 4/13  REMOVE: [ ] YES [ ] NO  EXPLAIN:    RIVERA: [ ] YES [x ] NO    DATE INSERTED:  REMOVE:  [ ] YES [ ] NO  EXPLAIN:    A-LINE:  [ ] YES [x ] NO  LOCATION:   DATE INSERTED:  REMOVE:  [ ] YES [ ] NO  EXPLAIN:    PMH -reviewed admission note, no change since admission  PAST MEDICAL & SURGICAL HISTORY:  No pertinent past medical history    S/P moody  1990s    S/P appendectomy  1990s        ICU Vital Signs Last 24 Hrs  T(C): 36.7 (18 Apr 2021 01:00), Max: 37.7 (17 Apr 2021 22:00)  T(F): 98 (18 Apr 2021 01:00), Max: 99.8 (17 Apr 2021 22:00)  HR: 107 (18 Apr 2021 02:00) (82 - 133)  BP: 80/53 (18 Apr 2021 02:00) (80/53 - 135/64)  BP(mean): 60 (18 Apr 2021 02:00) (59 - 87)  RR: 30 (18 Apr 2021 02:00) (24 - 32)  SpO2: 97% (18 Apr 2021 02:00) (87% - 97%)      ABG - ( 17 Apr 2021 04:16 )  pH, Arterial: 7.40  pH, Blood: x     /  pCO2: 89    /  pO2: 82    / HCO3: 55    / Base Excess: 24.5  /  SaO2: 99                    04-16 @ 07:01  -  04-17 @ 07:00  --------------------------------------------------------  IN: 2395.1 mL / OUT: 2710 mL / NET: -314.9 mL        Mode: AC/ CMV (Assist Control/ Continuous Mandatory Ventilation)  RR (machine): 30  TV (machine): 400  FiO2: 60  PEEP: 8  ITime: 1  MAP: 15  PIP: 30      PHYSICAL EXAM:    GENERAL: NAD, on Vent, sedated  HEAD: Normocephalic, atraumatic  EYES:  Dry eyes, conjunctivae/sclera clear, Unequal pupils  MOUTH : ET Tube  NECK: Supple,   LIJ, No JVD; Normal thyroid; Trachea midline no lymphadenopathy   NERVOUS SYSTEM:  sedated  CHEST/LUNG: B/L crackles (R>L), equal lung expansion  HEART: Regular rate and rhythm; No murmurs, rubs, or gallops, tachycardia  ABDOMEN: Soft, Nontender, Nondistended; Bowel sounds present  EXTREMITIES:  b/l upper extremities edema +2,  no edema on lower legs. No clubbing, cyanosis   SKIN: No rashes  + capillary refill <2 sec          LABS:  CBC Full  -  ( 17 Apr 2021 05:59 )  WBC Count : 8.49 K/uL  RBC Count : 2.39 M/uL  Hemoglobin : 7.8 g/dL  Hematocrit : 25.6 %  Platelet Count - Automated : 472 K/uL  Mean Cell Volume : 107.1 fl  Mean Cell Hemoglobin : 32.6 pg  Mean Cell Hemoglobin Concentration : 30.5 gm/dL  Auto Neutrophil # : 6.15 K/uL  Auto Lymphocyte # : 0.95 K/uL  Auto Monocyte # : 0.23 K/uL  Auto Eosinophil # : 0.15 K/uL  Auto Basophil # : 0.03 K/uL  Auto Neutrophil % : 72.4 %  Auto Lymphocyte % : 11.2 %  Auto Monocyte % : 2.7 %  Auto Eosinophil % : 1.8 %  Auto Basophil % : 0.4 %    04-17    145  |  101  |  20<H>  ----------------------------<  95  3.9   |  28  |  0.43<L>    Ca    8.6      17 Apr 2021 05:59  Phos  2.9     04-17  Mg     2.2     04-17    TPro  6.1  /  Alb  3.1<L>  /  TBili  0.5  /  DBili  x   /  AST  33  /  ALT  32  /  AlkPhos  79  04-17        Culture Results:   Moderate Enterobacter aerogenes  Normal Respiratory Monserrat present (04-16 @ 04:42)      RADIOLOGY & ADDITIONAL STUDIES REVIEWED:  yes    [ ]GOALS OF CARE DISCUSSION WITH PATIENT/FAMILY/PROXY:    CRITICAL CARE TIME SPENT: 35 minutes INTERVAL HPI/OVERNIGHT EVENTS: BP running on lower side, Started on pheny, Ibuprofen D/Connor , FiO2 increased to 70%  Patient had large pneumothorax note don Left, thoracic surgery was consulted ,  left chest tube placed.    PRESSORS: [ x] YES [ ] NO  WHICH: Phenylephrine     ANTIBIOTICS:                  DATE STARTED:  ANTIBIOTICS:                  DATE STARTED:  ANTIBIOTICS:                  DATE STARTED:    Antimicrobial:  meropenem  IVPB 1000 milliGRAM(s) IV Intermittent every 8 hours  meropenem  IVPB        Cardiovascular:  furosemide   Injectable 40 milliGRAM(s) IV Push two times a day  hydrochlorothiazide 50 milliGRAM(s) Enteral Tube daily  midodrine 5 milliGRAM(s) Oral every 8 hours  phenylephrine    Infusion 0.1 MICROgram(s)/kG/Min IV Continuous <Continuous>    Pulmonary:  ALBUTerol    90 MICROgram(s) HFA Inhaler 2 Puff(s) Inhalation every 6 hours    Hematalogic:  aspirin  chewable 81 milliGRAM(s) Oral daily  enoxaparin Injectable 40 milliGRAM(s) SubCutaneous daily    Other:  acetaminophen    Suspension .. 650 milliGRAM(s) Oral <User Schedule>  artificial  tears Solution 1 Drop(s) Both EYES every 4 hours PRN  ascorbic acid 1000 milliGRAM(s) Oral daily  chlorhexidine 0.12% Liquid 15 milliLiter(s) Oral Mucosa two times a day  chlorhexidine 2% Cloths 1 Application(s) Topical <User Schedule>  fentaNYL   Infusion 3.994 MICROgram(s)/kG/Hr IV Continuous <Continuous>  insulin lispro (ADMELOG) corrective regimen sliding scale   SubCutaneous every 6 hours  LORazepam     Tablet 1 milliGRAM(s) Oral every 4 hours PRN  midazolam Infusion 0.02 mG/kG/Hr IV Continuous <Continuous>  pantoprazole  Injectable 40 milliGRAM(s) IV Push daily  polyethylene glycol 3350 17 Gram(s) Oral daily  senna 2 Tablet(s) Oral at bedtime  sodium chloride 0.9% lock flush 10 milliLiter(s) IV Push every 1 hour PRN    acetaminophen    Suspension .. 650 milliGRAM(s) Oral <User Schedule>  ALBUTerol    90 MICROgram(s) HFA Inhaler 2 Puff(s) Inhalation every 6 hours  artificial  tears Solution 1 Drop(s) Both EYES every 4 hours PRN  ascorbic acid 1000 milliGRAM(s) Oral daily  aspirin  chewable 81 milliGRAM(s) Oral daily  chlorhexidine 0.12% Liquid 15 milliLiter(s) Oral Mucosa two times a day  chlorhexidine 2% Cloths 1 Application(s) Topical <User Schedule>  enoxaparin Injectable 40 milliGRAM(s) SubCutaneous daily  fentaNYL   Infusion 3.994 MICROgram(s)/kG/Hr IV Continuous <Continuous>  furosemide   Injectable 40 milliGRAM(s) IV Push two times a day  hydrochlorothiazide 50 milliGRAM(s) Enteral Tube daily  insulin lispro (ADMELOG) corrective regimen sliding scale   SubCutaneous every 6 hours  LORazepam     Tablet 1 milliGRAM(s) Oral every 4 hours PRN  meropenem  IVPB 1000 milliGRAM(s) IV Intermittent every 8 hours  meropenem  IVPB      midazolam Infusion 0.02 mG/kG/Hr IV Continuous <Continuous>  midodrine 5 milliGRAM(s) Oral every 8 hours  pantoprazole  Injectable 40 milliGRAM(s) IV Push daily  phenylephrine    Infusion 0.1 MICROgram(s)/kG/Min IV Continuous <Continuous>  polyethylene glycol 3350 17 Gram(s) Oral daily  senna 2 Tablet(s) Oral at bedtime  sodium chloride 0.9% lock flush 10 milliLiter(s) IV Push every 1 hour PRN    Drug Dosing Weight  Height (cm): 167.6 (14 Mar 2021 12:03)  Weight (kg): 83.869 (14 Mar 2021 12:03)  BMI (kg/m2): 29.9 (14 Mar 2021 12:03)  BSA (m2): 1.93 (14 Mar 2021 12:03)    CENTRAL LINE: [ x] YES [ ] NO  LOCATION: [facility]  DATE INSERTED: 4/13  REMOVE: [ ] YES [ ] NO  EXPLAIN:    RIVERA: [ ] YES [x ] NO    DATE INSERTED:  REMOVE:  [ ] YES [ ] NO  EXPLAIN:    A-LINE:  [ ] YES [x ] NO  LOCATION:   DATE INSERTED:  REMOVE:  [ ] YES [ ] NO  EXPLAIN:    PMH -reviewed admission note, no change since admission  PAST MEDICAL & SURGICAL HISTORY:  No pertinent past medical history    S/P moody  1990s    S/P appendectomy  1990s        ICU Vital Signs Last 24 Hrs  T(C): 36.7 (18 Apr 2021 01:00), Max: 37.7 (17 Apr 2021 22:00)  T(F): 98 (18 Apr 2021 01:00), Max: 99.8 (17 Apr 2021 22:00)  HR: 107 (18 Apr 2021 02:00) (82 - 133)  BP: 80/53 (18 Apr 2021 02:00) (80/53 - 135/64)  BP(mean): 60 (18 Apr 2021 02:00) (59 - 87)  RR: 30 (18 Apr 2021 02:00) (24 - 32)  SpO2: 97% (18 Apr 2021 02:00) (87% - 97%)      ABG - ( 17 Apr 2021 04:16 )  pH, Arterial: 7.40  pH, Blood: x     /  pCO2: 89    /  pO2: 82    / HCO3: 55    / Base Excess: 24.5  /  SaO2: 99                    04-16 @ 07:01  -  04-17 @ 07:00  --------------------------------------------------------  IN: 2395.1 mL / OUT: 2710 mL / NET: -314.9 mL    CXR: AM CXR show large Left PTX with complete left lung collapse   CXR post chest tube : left lung re-expand     Mode: AC/ CMV (Assist Control/ Continuous Mandatory Ventilation)  RR (machine): 30  TV (machine): 400  FiO2: 60  PEEP: 8  ITime: 1  MAP: 15  PIP: 30      PHYSICAL EXAM:    GENERAL: NAD, on Vent, sedated  HEAD: Normocephalic, atraumatic  EYES:  Dry eyes, conjunctivae/sclera clear, Unequal pupils  MOUTH : ET Tube  NECK: Supple,   LIJ, No JVD; Normal thyroid; Trachea midline no lymphadenopathy   NERVOUS SYSTEM:  sedated  CHEST/LUNG: B/L crackles (R>L), equal lung expansion L eft CXR  HEART: Regular rate and rhythm; No murmurs, rubs, or gallops, tachycardia  ABDOMEN: Soft, Nontender, Nondistended; Bowel sounds present  EXTREMITIES:  b/l upper extremities edema +2,  no edema on lower legs. No clubbing, cyanosis   SKIN: No rashes  + capillary refill <2 sec          LABS:  CBC Full  -  ( 17 Apr 2021 05:59 )  WBC Count : 8.49 K/uL  RBC Count : 2.39 M/uL  Hemoglobin : 7.8 g/dL  Hematocrit : 25.6 %  Platelet Count - Automated : 472 K/uL  Mean Cell Volume : 107.1 fl  Mean Cell Hemoglobin : 32.6 pg  Mean Cell Hemoglobin Concentration : 30.5 gm/dL  Auto Neutrophil # : 6.15 K/uL  Auto Lymphocyte # : 0.95 K/uL  Auto Monocyte # : 0.23 K/uL  Auto Eosinophil # : 0.15 K/uL  Auto Basophil # : 0.03 K/uL  Auto Neutrophil % : 72.4 %  Auto Lymphocyte % : 11.2 %  Auto Monocyte % : 2.7 %  Auto Eosinophil % : 1.8 %  Auto Basophil % : 0.4 %    04-17    145  |  101  |  20<H>  ----------------------------<  95  3.9   |  28  |  0.43<L>    Ca    8.6      17 Apr 2021 05:59  Phos  2.9     04-17  Mg     2.2     04-17    TPro  6.1  /  Alb  3.1<L>  /  TBili  0.5  /  DBili  x   /  AST  33  /  ALT  32  /  AlkPhos  79  04-17        Culture Results:   Moderate Enterobacter aerogenes  Normal Respiratory Monserrat present (04-16 @ 04:42)      RADIOLOGY & ADDITIONAL STUDIES REVIEWED:  yes      [ ]GOALS OF CARE DISCUSSION WITH PATIENT/FAMILY/PROXY:    CRITICAL CARE TIME SPENT: 35 minutes

## 2021-04-18 NOTE — PROGRESS NOTE ADULT - SUBJECTIVE AND OBJECTIVE BOX
Pt is sedated, intubated, lying in bed. L pigtail cath placed this AM for PTX.     INTERVAL HPI/OVERNIGHT EVENTS:      VITAL SIGNS:  T(F): 98.2 (04-18-21 @ 08:00)  HR: 97 (04-18-21 @ 11:00)  BP: 103/59 (04-18-21 @ 11:00)  RR: 30 (04-18-21 @ 11:00)  SpO2: 98% (04-18-21 @ 11:00)  Wt(kg): --  I&O's Detail    17 Apr 2021 07:01  -  18 Apr 2021 07:00  --------------------------------------------------------  IN:    Enteral Tube Flush: 100 mL    FentaNYL: 561 mL    FentaNYL: 198 mL    Glucerna 1.5: 575 mL    IV PiggyBack: 150 mL    Midazolam: 289 mL    Phenylephrine: 70.8 mL    Phenylephrine: 23.6 mL  Total IN: 1967.4 mL    OUT:    Incontinent per Condom Catheter (mL): 1580 mL  Total OUT: 1580 mL    Total NET: 387.4 mL      18 Apr 2021 07:01  -  18 Apr 2021 11:53  --------------------------------------------------------  IN:    FentaNYL: 132 mL    Glucerna 1.5: 100 mL    Midazolam: 52 mL    Phenylephrine: 109.2 mL  Total IN: 393.2 mL    OUT:    Incontinent per Condom Catheter (mL): 450 mL  Total OUT: 450 mL    Total NET: -56.8 mL        Mode: AC/ CMV (Assist Control/ Continuous Mandatory Ventilation)  RR (machine): 30  TV (machine): 420  FiO2: 80  PEEP: 8  ITime: 0.8  MAP: 20  PIP: 48        REVIEW OF SYSTEMS:    Unable to assess     PHYSICAL EXAM:    Constitutional: Well developed and nourished  Eyes:Perrla  ENMT: normal  Neck:supple  Respiratory: intubated on MV support   Cardiovascular: S1 S2 regular  Gastrointestinal: Soft, Non tender  Extremities: No edema  Vascular:normal  Neurological: sedated   Musculoskeletal: bed       MEDICATIONS  (STANDING):  acetaminophen    Suspension .. 650 milliGRAM(s) Oral <User Schedule>  ALBUTerol    90 MICROgram(s) HFA Inhaler 2 Puff(s) Inhalation every 6 hours  ascorbic acid 1000 milliGRAM(s) Oral daily  aspirin  chewable 81 milliGRAM(s) Oral daily  cefepime   IVPB 1000 milliGRAM(s) IV Intermittent once  cefepime   IVPB 1000 milliGRAM(s) IV Intermittent every 12 hours  cefepime   IVPB      chlorhexidine 0.12% Liquid 15 milliLiter(s) Oral Mucosa two times a day  chlorhexidine 2% Cloths 1 Application(s) Topical <User Schedule>  enoxaparin Injectable 40 milliGRAM(s) SubCutaneous daily  fentaNYL   Infusion 3.994 MICROgram(s)/kG/Hr (33.5 mL/Hr) IV Continuous <Continuous>  furosemide   Injectable 40 milliGRAM(s) IV Push daily  hydrochlorothiazide 50 milliGRAM(s) Enteral Tube daily  insulin lispro (ADMELOG) corrective regimen sliding scale   SubCutaneous every 6 hours  midazolam Infusion 0.02 mG/kG/Hr (1.68 mL/Hr) IV Continuous <Continuous>  midodrine 5 milliGRAM(s) Oral every 8 hours  pantoprazole  Injectable 40 milliGRAM(s) IV Push daily  phenylephrine    Infusion 1.5 MICROgram(s)/kG/Min (23.6 mL/Hr) IV Continuous <Continuous>  polyethylene glycol 3350 17 Gram(s) Oral daily  senna 2 Tablet(s) Oral at bedtime    MEDICATIONS  (PRN):  artificial  tears Solution 1 Drop(s) Both EYES every 4 hours PRN Dry Eyes  LORazepam     Tablet 1 milliGRAM(s) Oral every 4 hours PRN Agitation  sodium chloride 0.9% lock flush 10 milliLiter(s) IV Push every 1 hour PRN Pre/post blood products, medications, blood draw, and to maintain line patency      Allergies    No Known Allergies    Intolerances        LABS:                        8.7    11.62 )-----------( 556      ( 18 Apr 2021 06:39 )             29.2     04-18    145  |  95<L>  |  25<H>  ----------------------------<  98  3.9   |  36<H>  |  0.60    Ca    8.8      18 Apr 2021 06:39  Phos  4.3     04-18  Mg     2.5     04-18    TPro  6.4  /  Alb  2.8<L>  /  TBili  0.5  /  DBili  x   /  AST  28  /  ALT  35  /  AlkPhos  86  04-18        ABG - ( 18 Apr 2021 04:23 )  pH, Arterial: 7.38  pH, Blood: x     /  pCO2: 96    /  pO2: 67    / HCO3: 57    / Base Excess: 25.5  /  SaO2: 95                    CAPILLARY BLOOD GLUCOSE      POCT Blood Glucose.: 100 mg/dL (18 Apr 2021 06:24)  POCT Blood Glucose.: 117 mg/dL (18 Apr 2021 00:48)  POCT Blood Glucose.: 98 mg/dL (17 Apr 2021 15:28)    pro-bnp -- 04-18 @ 06:39     d-dimer 1778  04-18 @ 06:39  pro-bnp -- 04-15 @ 06:08     d-dimer 841  04-15 @ 06:08  pro-bnp -- 04-12 @ 04:26     d-dimer 1313  04-12 @ 04:26      RADIOLOGY & ADDITIONAL TESTS:    CXR:  x  Ct scan chest:    ekg;    echo:

## 2021-04-18 NOTE — PROGRESS NOTE ADULT - SUBJECTIVE AND OBJECTIVE BOX
Patient is a 55y old  Male who presents with a chief complaint of SOB (18 Apr 2021 02:44)    pt seen in icu [ x ], reg med floor [   ], bed [ x ], chair at bedside [   ], a+o x3 [  ], sedated [x  ],  nad [x  ]    andrea [ x ], ngt feed [x  ], et tube [ x ], cent line [x  ],        Allergies    No Known Allergies        Vitals    T(F): 99.4 (04-18-21 @ 05:00), Max: 99.8 (04-17-21 @ 22:00)  HR: 100 (04-18-21 @ 06:00) (82 - 133)  BP: 116/70 (04-18-21 @ 06:00) (80/53 - 134/69)  RR: 30 (04-18-21 @ 06:00) (21 - 32)  SpO2: 93% (04-18-21 @ 06:00) (87% - 97%)  Wt(kg): --  CAPILLARY BLOOD GLUCOSE      POCT Blood Glucose.: 100 mg/dL (18 Apr 2021 06:24)      Labs                          7.8    8.49  )-----------( 472      ( 17 Apr 2021 05:59 )             25.6       04-17    145  |  101  |  20<H>  ----------------------------<  95  3.9   |  28  |  0.43<L>    Ca    8.6      17 Apr 2021 05:59  Phos  2.9     04-17  Mg     2.2     04-17    TPro  6.1  /  Alb  3.1<L>  /  TBili  0.5  /  DBili  x   /  AST  33  /  ALT  32  /  AlkPhos  79  04-17            .Sputum Sputum  04-16 @ 04:42   Moderate Enterobacter aerogenes  Normal Respiratory Monserrat present  --    Few polymorphonuclear leukocytes per low power field  Rare Squamous epithelial cells per low power field  Few Gram Positive Rods seen per oil power field      .Urine Clean Catch (Midstream)  03-15 @ 00:52   No growth  --  --          Radiology Results      Meds    MEDICATIONS  (STANDING):  acetaminophen    Suspension .. 650 milliGRAM(s) Oral <User Schedule>  ALBUTerol    90 MICROgram(s) HFA Inhaler 2 Puff(s) Inhalation every 6 hours  ascorbic acid 1000 milliGRAM(s) Oral daily  aspirin  chewable 81 milliGRAM(s) Oral daily  chlorhexidine 0.12% Liquid 15 milliLiter(s) Oral Mucosa two times a day  chlorhexidine 2% Cloths 1 Application(s) Topical <User Schedule>  enoxaparin Injectable 40 milliGRAM(s) SubCutaneous daily  fentaNYL   Infusion 3.994 MICROgram(s)/kG/Hr (33.5 mL/Hr) IV Continuous <Continuous>  furosemide   Injectable 40 milliGRAM(s) IV Push daily  hydrochlorothiazide 50 milliGRAM(s) Enteral Tube daily  insulin lispro (ADMELOG) corrective regimen sliding scale   SubCutaneous every 6 hours  meropenem  IVPB 1000 milliGRAM(s) IV Intermittent every 8 hours  meropenem  IVPB      midazolam Infusion 0.02 mG/kG/Hr (1.68 mL/Hr) IV Continuous <Continuous>  midodrine 5 milliGRAM(s) Oral every 8 hours  pantoprazole  Injectable 40 milliGRAM(s) IV Push daily  phenylephrine    Infusion 1.5 MICROgram(s)/kG/Min (23.6 mL/Hr) IV Continuous <Continuous>  polyethylene glycol 3350 17 Gram(s) Oral daily  senna 2 Tablet(s) Oral at bedtime      MEDICATIONS  (PRN):  artificial  tears Solution 1 Drop(s) Both EYES every 4 hours PRN Dry Eyes  LORazepam     Tablet 1 milliGRAM(s) Oral every 4 hours PRN Agitation  sodium chloride 0.9% lock flush 10 milliLiter(s) IV Push every 1 hour PRN Pre/post blood products, medications, blood draw, and to maintain line patency      Physical Exam    Neuro :  no focal deficits  Respiratory: CTA B/L  CV: RRR, S1S2, no murmurs,   Abdominal: Soft, NT, ND +BS,  Extremities: No edema, + peripheral pulses      ASSESSMENT    Hypoxemia 2nd to covid pna   transaminitis  prediabetes  h/o appendectomy  cholecystectomy        PLAN    contact and airborne isolation  d/c remdesevir given covid ab positive noted   completed dexamethasone   started pulse steroids for 3 days - 250mg solumedrol bid now tapered off 4/14/21  cont asa, vit c,    cont albuterol inhaler   pulm f/u  procalcitonin, D-dimer, crp, ldh, ferritin, lactate noted ,    tmx 100.7  cont tylenol prn,   cont robitussin prn   pt on fentanyl, phenylephrine, midazolam drip  s/p intubation 3/29/21  O2 sat (89% - 96%) mech vent 60%  O2 via mech vent and taper fio2 as tolerated   vent mgmt as per icu  xray 3/19/21 with pneumomediastinum  rept cxr with New trace right apical pneumothorax. New mild left apical pneumothorax. Grossly stable small pneumomediastinum.  Soft tissue emphysema at the neck bases bilaterally. Grossly stable bilateral pulmonary infiltrates noted.   cxr 2/24 with No evidence of pneumothorax can be appreciated on the available image. This may be related to patient positioning. Evidence of pneumomediastinum and subcutaneous emphysema in the lower neck is again noted. There are patchy bibasilar infiltrates and elevated right hemidiaphragm noted.   cxr 3/29/21 with No significant change bilateral infiltrates. There is a small simple left apical pneumothorax. No significant pleural effusion. Bilateral subcutaneous emphysema similar to prior.   pt intubated 6AM 3/29/21,   XRs on 7AM and 5PM with no obvious increase of ptx  cxr 4/4/21 with Improving bilateral airspace disease noted    cxr 4/8/21 with Small left pneumothorax noted.  thoracic surg f/u   s/p L chest tube placement  CXR 4/11/21 with : No interval change compared to one day prior. No pneumothorax noted above.   Daily CXR  Monitor O2 status   pt for possible trach next week if fio2 <50%  id f/u   Continue Meropenem 1g iv q8  f/u blood cx  andrea d/c'd  lispro ss   prognosis poor  cont current meds         Patient is a 55y old  Male who presents with a chief complaint of SOB (18 Apr 2021 02:44)    pt seen in icu [ x ], reg med floor [   ], bed [ x ], chair at bedside [   ], a+o x3 [  ], sedated [x  ],  nad [x  ]    andrea [ x ], ngt feed [x  ], et tube [ x ], cent line [x  ],        Allergies    No Known Allergies        Vitals    T(F): 99.4 (04-18-21 @ 05:00), Max: 99.8 (04-17-21 @ 22:00)  HR: 100 (04-18-21 @ 06:00) (82 - 133)  BP: 116/70 (04-18-21 @ 06:00) (80/53 - 134/69)  RR: 30 (04-18-21 @ 06:00) (21 - 32)  SpO2: 93% (04-18-21 @ 06:00) (87% - 97%)  Wt(kg): --  CAPILLARY BLOOD GLUCOSE      POCT Blood Glucose.: 100 mg/dL (18 Apr 2021 06:24)      Labs                          7.8    8.49  )-----------( 472      ( 17 Apr 2021 05:59 )             25.6       04-17    145  |  101  |  20<H>  ----------------------------<  95  3.9   |  28  |  0.43<L>    Ca    8.6      17 Apr 2021 05:59  Phos  2.9     04-17  Mg     2.2     04-17    TPro  6.1  /  Alb  3.1<L>  /  TBili  0.5  /  DBili  x   /  AST  33  /  ALT  32  /  AlkPhos  79  04-17            .Sputum Sputum  04-16 @ 04:42   Moderate Enterobacter aerogenes  Normal Respiratory Monserrat present  --    Few polymorphonuclear leukocytes per low power field  Rare Squamous epithelial cells per low power field  Few Gram Positive Rods seen per oil power field      .Urine Clean Catch (Midstream)  03-15 @ 00:52   No growth  --  --          Radiology Results      Meds    MEDICATIONS  (STANDING):  acetaminophen    Suspension .. 650 milliGRAM(s) Oral <User Schedule>  ALBUTerol    90 MICROgram(s) HFA Inhaler 2 Puff(s) Inhalation every 6 hours  ascorbic acid 1000 milliGRAM(s) Oral daily  aspirin  chewable 81 milliGRAM(s) Oral daily  chlorhexidine 0.12% Liquid 15 milliLiter(s) Oral Mucosa two times a day  chlorhexidine 2% Cloths 1 Application(s) Topical <User Schedule>  enoxaparin Injectable 40 milliGRAM(s) SubCutaneous daily  fentaNYL   Infusion 3.994 MICROgram(s)/kG/Hr (33.5 mL/Hr) IV Continuous <Continuous>  furosemide   Injectable 40 milliGRAM(s) IV Push daily  hydrochlorothiazide 50 milliGRAM(s) Enteral Tube daily  insulin lispro (ADMELOG) corrective regimen sliding scale   SubCutaneous every 6 hours  meropenem  IVPB 1000 milliGRAM(s) IV Intermittent every 8 hours  meropenem  IVPB      midazolam Infusion 0.02 mG/kG/Hr (1.68 mL/Hr) IV Continuous <Continuous>  midodrine 5 milliGRAM(s) Oral every 8 hours  pantoprazole  Injectable 40 milliGRAM(s) IV Push daily  phenylephrine    Infusion 1.5 MICROgram(s)/kG/Min (23.6 mL/Hr) IV Continuous <Continuous>  polyethylene glycol 3350 17 Gram(s) Oral daily  senna 2 Tablet(s) Oral at bedtime      MEDICATIONS  (PRN):  artificial  tears Solution 1 Drop(s) Both EYES every 4 hours PRN Dry Eyes  LORazepam     Tablet 1 milliGRAM(s) Oral every 4 hours PRN Agitation  sodium chloride 0.9% lock flush 10 milliLiter(s) IV Push every 1 hour PRN Pre/post blood products, medications, blood draw, and to maintain line patency      Physical Exam    Neuro :  no focal deficits  Respiratory: CTA B/L  CV: RRR, S1S2, no murmurs,   Abdominal: Soft, NT, ND +BS,  Extremities: No edema, + peripheral pulses      ASSESSMENT    Hypoxemia 2nd to covid pna   transaminitis  prediabetes  h/o appendectomy  cholecystectomy        PLAN    contact and airborne isolation  d/c remdesevir given covid ab positive noted   completed dexamethasone   started pulse steroids for 3 days - 250mg solumedrol bid now tapered off 4/14/21  cont asa, vit c,    cont albuterol inhaler   pulm f/u  procalcitonin, D-dimer, crp, ldh, ferritin, lactate noted ,    tmx 99.8  cont tylenol prn,   cont robitussin prn   pt on fentanyl, phenylephrine, midazolam drip  s/p intubation 3/29/21  O2 sat (87% - 97%) mech vent 70%  O2 via mech vent and taper fio2 as tolerated   vent mgmt as per icu  xray 3/19/21 with pneumomediastinum  rept cxr with New trace right apical pneumothorax. New mild left apical pneumothorax. Grossly stable small pneumomediastinum.  Soft tissue emphysema at the neck bases bilaterally. Grossly stable bilateral pulmonary infiltrates noted.   cxr 2/24 with No evidence of pneumothorax can be appreciated on the available image. This may be related to patient positioning. Evidence of pneumomediastinum and subcutaneous emphysema in the lower neck is again noted. There are patchy bibasilar infiltrates and elevated right hemidiaphragm noted.   cxr 3/29/21 with No significant change bilateral infiltrates. There is a small simple left apical pneumothorax. No significant pleural effusion. Bilateral subcutaneous emphysema similar to prior.   pt intubated 6AM 3/29/21,   XRs on 7AM and 5PM with no obvious increase of ptx  cxr 4/4/21 with Improving bilateral airspace disease noted    cxr 4/8/21 with Small left pneumothorax noted.  thoracic surg f/u   s/p L chest tube placement  CXR 4/11/21 with : No interval change compared to one day prior. No pneumothorax noted above.   Daily CXR  Monitor O2 status   pt for possible trach next week if fio2 <50%  id f/u   Continue Meropenem 1g iv q8  f/u blood cx  andrea d/c'd  lispro ss   prognosis poor  cont current meds  mgmt as per icu

## 2021-04-18 NOTE — CHART NOTE - NSCHARTNOTEFT_GEN_A_CORE
Pt with prev L pigtail cath for ptx removed several days ago  known to thoracic service  surgery team was called earlier for Large left ptx  L pigtail cath emergently placed for Large L PTX  family attempted to be contacted unsuccessfully  d/w Dr Arroyo  proceed with emergency CT placement    see procedure note    ICU Vital Signs Last 24 Hrs  T(C): 36.8 (18 Apr 2021 08:00), Max: 37.7 (17 Apr 2021 22:00)  T(F): 98.2 (18 Apr 2021 08:00), Max: 99.8 (17 Apr 2021 22:00)  HR: 97 (18 Apr 2021 11:00) (93 - 133)  BP: 103/59 (18 Apr 2021 11:00) (76/44 - 145/75)  BP(mean): 68 (18 Apr 2021 11:00) (51 - 91)  ABP: --  ABP(mean): --  RR: 30 (18 Apr 2021 11:00) (10 - 32)  SpO2: 98% (18 Apr 2021 11:00) (86% - 98%)                          8.7    11.62 )-----------( 556      ( 18 Apr 2021 06:39 )             29.2

## 2021-04-18 NOTE — PROGRESS NOTE ADULT - ATTENDING COMMENTS
55 yr old  man , non smoker with  moody 1990s presented 3/14 with x9 days worsening cough, subjective fevers, and SOB, with x2-3 days dysuria and central, non-radiating, constant CP. Admitted to medicine unit  for acute hypoxic respiratory failure secondary to pna from covid-19 infection .     Assessment:  1. Acute hypoxic respiratory failure  2 Covid-19 infection   3. Transaminitis  4. Prediabetes  5. Bilateral pneumothorax  6. Septic shock     Plan   -Cont. antibiotics ID consultation appreciated  -pat intubated 3/29   -Mechanical ventilation with lung protective strategy, titrate down fio2 as tolerated  -adjust vent as per ABG  -permissive hypercapnia   -Vasopressor support  -PTX  improved post chest tube placement  -chest tube removed 4/15  -left large PTX  -4/18 left chest tube re-placed with left lung re-expansion   -keep chest tube to suction   -Cont. precedex and fentanyl  -Thoracic surgery consult for PEG/Trach placement, once hemodynamically stable  -pre-op   -Cont. versed/fentanyl as patient asynchronous with the vent  -Albumin/lasix for diuresis  -isolation : contact and air borne   -monitor biomarkers daily, trending down   -Tube feedings, bowel regimen  -dvt/gi prophy  -hemodynamic monitoring   -Prognosis is guarded . .   -on antibx for PNA .

## 2021-04-18 NOTE — PROGRESS NOTE ADULT - ASSESSMENT
Assessment and Recommendation:   Problem/Recommendation - 1:  Problem: COVID-19. Recommendation: isolation precautions  cont mechanical ventilation  taper FIO2 as tolerated  Wean as tolerated  pulmonary toilet  supplemental nutrition  ICU management.   Monitor oxygen sat  Monitor LFT, LDH, CRP, D-Dimer, Ferritin and procalcitonin  Vit C, D and zinc supp  Montelukast 10 mgs po Qhs  Daily CXR   DVT and GI PPX   May require trach and Peg     Problem/Recommendation - 2:  ·  Problem: UTI (urinary tract infection).  Recommendation: F.U cultures   Off Antibiotics.     Problem/Recommendation - 3:  ·  Problem: Chest pain.  Recommendation: likely related to Covid-19 infection.     Problem/Recommendation - 4:  ·  Problem: Transaminitis.  Recommendation: monitor LFTs  GI F/U     Problem/Recommendation - 5:  ·  Problem: Pneumothorax.  Recommendation: L pigtail placed today.   Thoracic sx follow up  Daily  CXR   Management per ICU

## 2021-04-18 NOTE — PROGRESS NOTE ADULT - ASSESSMENT
1. Acute hypoxic respiratory failure  2. ARDS 2/2 Covid pneumonia  3. Transaminitis  4. Prediabetes  5. Abnormal TSH    =================== Neuro============================  Alert and oriented x 3 at baseline   intubated and sedated 3/29 on Vent  Continue Versed to help with vent synchrony with Fentanyl  CT head unremarkable     ================= Cardiovascular==========================  # Hypotension   Off pressors   C/w midodrine   Restarted on phenylephrine      # TACHYCARDIA: improving  -HR in 90s-100s.   -continue monitoring     ================- Pulm=================================  #Acute hypoxic respiratory failure: secondary to Covid pneumonia  #ARDS  -was on HFNr then got intubated 3/29  -VC Vent setting : 30/400/60/8  -D-dimer initially elevated on presentation, Slowly rising again   - Patient on increased FiO2 requirement, will monitor   - Thoracic surgery will take for Trach and peg once O2 requirements come down     -Remdesivir was discontinued due to positive antibodies   - c/w supportive care   - trend COVID  markers  - Finished Dexamethasone   - prednisone taper, Finished      # Bacterial Pneumonia  - stable infiltrate on CXR ,likely developed VAP, on Zosyn, s/p 1 dose of Vanco   - continue Meropenem 4/13, f/u ID for further recs  - MRSA negative from 3/26  - sputum Cx gorwing few gram negative rods,   -pulm. dr. Ryan  - ID consult Dr Anand       #Pneumothorax and pneumomediastinum: resolved  -X-ray chest: tiny left apical pneumothorax  -Thoracic surgery following  -s/p Chest tube placed 4/8 pigtail to wall suction.  d/c'd 4/15  -X-ray monitoring daily, PTX resolved      ==================ID===================================  Likely bacterial pneumonia at present  -leukocytosis resolved  -Still spiking fevers,  started on Alternative doses of Motrin and Tylenol for fever control  - Continue Meropenem (D 6), f/u ID for duration   - C-procal elevated  -plan as above     ================= Nephro================================  -pitting edema to both lower arms  -on condom cath   -monitor I/Os   -Net negative , Urine output monitoring  -s/p Albumin x 2 days on 4/10  -On Lasix 40mg BID with Albumin 25% Q6 for 48 hours to help with urine output and fluid status.( 4/15) Albumin finished   - s/p 1 dose of diamox  -C/w HCTZ 50mg daily   -monitor lytes, CMP    =================GI====================================  Transaminitis:   likely secondary to Covid19   - and  on presentation   hepatitis panel -ve  -Improved  -continue to monitor LFT    diet:   NGT (3/29) with tube feed  bowel regimen   Last BM 4/16      ================ Heme==================================  Elevated d-dimer: likely secondary to Covid  -d-dimer 423 on admission  -last D-dimer 841<---1313  -continue prophylactic Lovenox 40 mg daily    Anemia  Hgb 7.3, no active signs of bleeding  repeat CBC pm  f/u iron panel, FOBT  will transfuse  if hgb drops.     =================Endocrine===============================  Prediabetes:    -A1c  5.8  -BS controlled  -continue HSS  -monitor FS while on steroids    Abnormal TSH:  -TSH level noted 0.26,   -repeat TSH 0.37 and Free T4 1.82    ================= Skin/Catheters============================  No rashes. Peripheral IV lines.   RIJ 4/13  RRA 3/29, Removed    - =================Prophylaxis =============================  Lovenox for DVT proph : on hold for now for anemia, may resume when CBC improving  Protonix for  GI proph    ==================GOC==================================   FULL CODE     1. Acute hypoxic respiratory failure  2. ARDS 2/2 Covid pneumonia  3. Transaminitis  4. Prediabetes  5. Abnormal TSH    =================== Neuro============================  Alert and oriented x 3 at baseline   intubated and sedated 3/29 on Vent  Continue Versed to help with vent synchrony with Fentanyl  CT head unremarkable     ================= Cardiovascular==========================  # Hypotension   Off pressors   C/w midodrine   Restarted on phenylephrine      # TACHYCARDIA: improving  -HR in 90s-100s.   -continue monitoring     ================- Pulm=================================  #Acute hypoxic respiratory failure: secondary to Covid pneumonia  #ARDS  -was on HFNr then got intubated 3/29  -VC Vent setting : 30/400/60/8  -D-dimer initially elevated on presentation, Slowly rising again   - Patient on increased FiO2 requirement, will monitor   - Thoracic surgery will take for Trach and peg once O2 requirements come down   - Large Pneumothorax was noted on 4/18 on left side, s/p chest tube placemet    -Remdesivir was discontinued due to positive antibodies   - c/w supportive care   - trend COVID  markers  - Finished Dexamethasone   - prednisone taper, Finished      # Bacterial Pneumonia  - stable infiltrate on CXR ,likely developed VAP, on Zosyn, s/p 1 dose of Vanco   - continue Meropenem 4/13, f/u ID for further recs  - MRSA negative from 3/26  - sputum Cx gorwing few gram negative rods,   -pulm. dr. Ryan  - ID consult Dr Anand       #Pneumothorax and pneumomediastinum: resolved  -X-ray chest: tiny left apical pneumothorax  -Thoracic surgery following  -s/p Chest tube placed 4/8 pigtail to wall suction.  d/c'd 4/15  -X-ray monitoring daily, PTX resolved      ==================ID===================================  Likely bacterial pneumonia at present  -leukocytosis resolved  -Still spiking fevers,  started on Alternative doses of Motrin and Tylenol for fever control  - Continue Meropenem (D 6), f/u ID for duration   - C-procal elevated  -plan as above     ================= Nephro================================  -pitting edema to both lower arms  -on condom cath   -monitor I/Os   -Net negative , Urine output monitoring  -s/p Albumin x 2 days on 4/10  -On Lasix 40mg BID with Albumin 25% Q6 for 48 hours to help with urine output and fluid status.( 4/15) Albumin finished   - s/p 1 dose of diamox  -C/w HCTZ 50mg daily   -monitor lytes, CMP    =================GI====================================  Transaminitis:   likely secondary to Covid19   - and  on presentation   hepatitis panel -ve  -Improved  -continue to monitor LFT    diet:   NGT (3/29) with tube feed  bowel regimen   Last BM 4/16      ================ Heme==================================  Elevated d-dimer: likely secondary to Covid  -d-dimer 423 on admission  -last D-dimer 841<---1313  -continue prophylactic Lovenox 40 mg daily    Anemia  Hgb 7.3, no active signs of bleeding  repeat CBC pm  f/u iron panel, FOBT  will transfuse  if hgb drops.     =================Endocrine===============================  Prediabetes:    -A1c  5.8  -BS controlled  -continue HSS  -monitor FS while on steroids    Abnormal TSH:  -TSH level noted 0.26,   -repeat TSH 0.37 and Free T4 1.82    ================= Skin/Catheters============================  No rashes. Peripheral IV lines.   RIJ 4/13  RRA 3/29, Removed    - =================Prophylaxis =============================  Lovenox for DVT proph : on hold for now for anemia, may resume when CBC improving  Protonix for  GI proph    ==================GOC==================================   FULL CODE     1. Acute hypoxic respiratory failure  2. ARDS 2/2 Covid pneumonia  3. Transaminitis  4. Prediabetes  5. Abnormal TSH    =================== Neuro============================  Alert and oriented x 3 at baseline   intubated and sedated 3/29 on Vent  Continue Versed to help with vent synchrony with Fentanyl  CT head unremarkable     ================= Cardiovascular==========================  # Hypotension      C/w midodrine   Restarted on phenylephrine      # TACHYCARDIA: improving  -HR in 90s-100s.   -continue monitoring     ================- Pulm=================================  #Acute hypoxic respiratory failure: secondary to Covid pneumonia  #ARDS  -was on HFNr then got intubated 3/29  -VC Vent setting : 30/400/60/8  -D-dimer initially elevated on presentation, Slowly rising again   - Patient on increased FiO2 requirement, will monitor   - Thoracic surgery will take for Trach and peg once O2 requirements come down   s/p Pigtail on left chest , removed on 4/15  - Large Pneumothorax was noted on 4/18 on left side, s/p chest tube placemet    -Remdesivir was discontinued due to positive antibodies   - c/w supportive care   - trend COVID  markers  - Finished Dexamethasone   - prednisone taper, Finished      # Bacterial Pneumonia  - stable infiltrate on CXR ,likely developed VAP, on Zosyn, s/p 1 dose of Vanco   - continue Meropenem 4/13, f/u ID for further recs  - MRSA negative from 3/26  - sputum Cx gorwing few gram negative rods,   -pulm. dr. Ryan  - ID consult Dr Anand       #Pneumothorax and pneumomediastinum: resolved  -X-ray chest: tiny left apical pneumothorax  -Thoracic surgery following  -s/p Chest tube placed 4/8 pigtail to wall suction.  d/c'd 4/15  -X-ray monitoring daily, PTX resolved      ==================ID===================================  Likely bacterial pneumonia at present  -leukocytosis resolved  -Still spiking fevers,  started on Alternative doses of Motrin and Tylenol for fever control  - Continue Meropenem (D 6), f/u ID for duration   - C-procal elevated  -plan as above     ================= Nephro================================  -pitting edema to both lower arms  -on condom cath   -monitor I/Os   -Net negative , Urine output monitoring  -s/p Albumin x 2 days on 4/10  -On Lasix 40mg BID with Albumin 25% Q6 for 48 hours to help with urine output and fluid status.( 4/15) Albumin finished   - s/p 1 dose of diamox  -C/w HCTZ 50mg daily   -monitor lytes, CMP    =================GI====================================  Transaminitis:   likely secondary to Covid19   - and  on presentation   hepatitis panel -ve  -Improved  -continue to monitor LFT    diet:   NGT (3/29) with tube feed  bowel regimen   Last BM 4/16      ================ Heme==================================  Elevated d-dimer: likely secondary to Covid  -d-dimer 423 on admission  -last D-dimer 841<---1313  -continue prophylactic Lovenox 40 mg daily    Anemia  Hgb 7.3, no active signs of bleeding  repeat CBC pm  f/u iron panel, FOBT  will transfuse  if hgb drops.     =================Endocrine===============================  Prediabetes:    -A1c  5.8  -BS controlled  -continue HSS  -monitor FS while on steroids    Abnormal TSH:  -TSH level noted 0.26,   -repeat TSH 0.37 and Free T4 1.82    ================= Skin/Catheters============================  No rashes. Peripheral IV lines.   RIJ 4/13  RRA 3/29, Removed    - =================Prophylaxis =============================  Lovenox for DVT proph : on hold for now for anemia, may resume when CBC improving  Protonix for  GI proph    ==================GOC==================================   FULL CODE

## 2021-04-19 PROBLEM — Z78.9 OTHER SPECIFIED HEALTH STATUS: Chronic | Status: ACTIVE | Noted: 2021-03-14

## 2021-04-19 PROBLEM — Z00.00 ENCOUNTER FOR PREVENTIVE HEALTH EXAMINATION: Status: ACTIVE | Noted: 2021-04-19

## 2021-04-19 LAB
ALBUMIN SERPL ELPH-MCNC: 2.7 G/DL — LOW (ref 3.5–5)
ALP SERPL-CCNC: 98 U/L — SIGNIFICANT CHANGE UP (ref 40–120)
ALT FLD-CCNC: 31 U/L DA — SIGNIFICANT CHANGE UP (ref 10–60)
ANION GAP SERPL CALC-SCNC: -1 MMOL/L — LOW (ref 5–17)
AST SERPL-CCNC: 30 U/L — SIGNIFICANT CHANGE UP (ref 10–40)
BASE EXCESS BLDA CALC-SCNC: 25.5 MMOL/L — HIGH (ref -2–3)
BASOPHILS # BLD AUTO: 0.04 K/UL — SIGNIFICANT CHANGE UP (ref 0–0.2)
BASOPHILS NFR BLD AUTO: 0.4 % — SIGNIFICANT CHANGE UP (ref 0–2)
BILIRUB SERPL-MCNC: 0.5 MG/DL — SIGNIFICANT CHANGE UP (ref 0.2–1.2)
BLD GP AB SCN SERPL QL: SIGNIFICANT CHANGE UP
BLD GP AB SCN SERPL QL: SIGNIFICANT CHANGE UP
BLOOD GAS COMMENTS ARTERIAL: SIGNIFICANT CHANGE UP
BUN SERPL-MCNC: 25 MG/DL — HIGH (ref 7–18)
CALCIUM SERPL-MCNC: 8.6 MG/DL — SIGNIFICANT CHANGE UP (ref 8.4–10.5)
CHLORIDE SERPL-SCNC: 96 MMOL/L — SIGNIFICANT CHANGE UP (ref 96–108)
CO2 SERPL-SCNC: 48 MMOL/L — CRITICAL HIGH (ref 22–31)
CREAT SERPL-MCNC: 0.46 MG/DL — LOW (ref 0.5–1.3)
D DIMER BLD IA.RAPID-MCNC: 2394 NG/ML DDU — HIGH
EOSINOPHIL # BLD AUTO: 0.25 K/UL — SIGNIFICANT CHANGE UP (ref 0–0.5)
EOSINOPHIL NFR BLD AUTO: 2.4 % — SIGNIFICANT CHANGE UP (ref 0–6)
GLUCOSE BLDC GLUCOMTR-MCNC: 157 MG/DL — HIGH (ref 70–99)
GLUCOSE SERPL-MCNC: 127 MG/DL — HIGH (ref 70–99)
HCO3 BLDA-SCNC: 55 MMOL/L — CRITICAL HIGH (ref 21–28)
HCT VFR BLD CALC: 29.8 % — LOW (ref 39–50)
HGB BLD-MCNC: 8.8 G/DL — LOW (ref 13–17)
HOROWITZ INDEX BLDA+IHG-RTO: SIGNIFICANT CHANGE UP
IMM GRANULOCYTES NFR BLD AUTO: 11.1 % — HIGH (ref 0–1.5)
INR BLD: 1.2 RATIO — HIGH (ref 0.88–1.16)
LYMPHOCYTES # BLD AUTO: 19.7 % — SIGNIFICANT CHANGE UP (ref 13–44)
LYMPHOCYTES # BLD AUTO: 2.02 K/UL — SIGNIFICANT CHANGE UP (ref 1–3.3)
MAGNESIUM SERPL-MCNC: 2.3 MG/DL — SIGNIFICANT CHANGE UP (ref 1.6–2.6)
MCHC RBC-ENTMCNC: 29.5 GM/DL — LOW (ref 32–36)
MCHC RBC-ENTMCNC: 31 PG — SIGNIFICANT CHANGE UP (ref 27–34)
MCV RBC AUTO: 104.9 FL — HIGH (ref 80–100)
MONOCYTES # BLD AUTO: 0.32 K/UL — SIGNIFICANT CHANGE UP (ref 0–0.9)
MONOCYTES NFR BLD AUTO: 3.1 % — SIGNIFICANT CHANGE UP (ref 2–14)
NEUTROPHILS # BLD AUTO: 6.46 K/UL — SIGNIFICANT CHANGE UP (ref 1.8–7.4)
NEUTROPHILS NFR BLD AUTO: 63.3 % — SIGNIFICANT CHANGE UP (ref 43–77)
NRBC # BLD: 2 /100 WBCS — HIGH (ref 0–0)
PCO2 BLDA: 79 MMHG — CRITICAL HIGH (ref 35–48)
PH BLDA: 7.45 — SIGNIFICANT CHANGE UP (ref 7.35–7.45)
PHOSPHATE SERPL-MCNC: 2 MG/DL — LOW (ref 2.5–4.5)
PLATELET # BLD AUTO: 485 K/UL — HIGH (ref 150–400)
PO2 BLDA: 87 MMHG — SIGNIFICANT CHANGE UP (ref 83–108)
POTASSIUM SERPL-MCNC: 3.4 MMOL/L — LOW (ref 3.5–5.3)
POTASSIUM SERPL-SCNC: 3.4 MMOL/L — LOW (ref 3.5–5.3)
PROT SERPL-MCNC: 6.3 G/DL — SIGNIFICANT CHANGE UP (ref 6–8.3)
PROTHROM AB SERPL-ACNC: 14.2 SEC — HIGH (ref 10.6–13.6)
RBC # BLD: 2.84 M/UL — LOW (ref 4.2–5.8)
RBC # FLD: 14.6 % — HIGH (ref 10.3–14.5)
SAO2 % BLDA: 98 % — SIGNIFICANT CHANGE UP
SODIUM SERPL-SCNC: 143 MMOL/L — SIGNIFICANT CHANGE UP (ref 135–145)
WBC # BLD: 10.23 K/UL — SIGNIFICANT CHANGE UP (ref 3.8–10.5)
WBC # FLD AUTO: 10.23 K/UL — SIGNIFICANT CHANGE UP (ref 3.8–10.5)

## 2021-04-19 PROCEDURE — 93971 EXTREMITY STUDY: CPT | Mod: 26,LT

## 2021-04-19 PROCEDURE — 71045 X-RAY EXAM CHEST 1 VIEW: CPT | Mod: 26

## 2021-04-19 PROCEDURE — 71045 X-RAY EXAM CHEST 1 VIEW: CPT | Mod: 26,77

## 2021-04-19 PROCEDURE — 99231 SBSQ HOSP IP/OBS SF/LOW 25: CPT

## 2021-04-19 RX ORDER — POTASSIUM CHLORIDE 20 MEQ
40 PACKET (EA) ORAL ONCE
Refills: 0 | Status: COMPLETED | OUTPATIENT
Start: 2021-04-19 | End: 2021-04-19

## 2021-04-19 RX ORDER — POTASSIUM CHLORIDE 20 MEQ
40 PACKET (EA) ORAL EVERY 4 HOURS
Refills: 0 | Status: DISCONTINUED | OUTPATIENT
Start: 2021-04-19 | End: 2021-04-19

## 2021-04-19 RX ORDER — ENOXAPARIN SODIUM 100 MG/ML
40 INJECTION SUBCUTANEOUS DAILY
Refills: 0 | Status: DISCONTINUED | OUTPATIENT
Start: 2021-04-19 | End: 2021-04-20

## 2021-04-19 RX ORDER — MIDODRINE HYDROCHLORIDE 2.5 MG/1
10 TABLET ORAL THREE TIMES A DAY
Refills: 0 | Status: DISCONTINUED | OUTPATIENT
Start: 2021-04-19 | End: 2021-04-21

## 2021-04-19 RX ORDER — FUROSEMIDE 40 MG
40 TABLET ORAL
Refills: 0 | Status: DISCONTINUED | OUTPATIENT
Start: 2021-04-19 | End: 2021-04-19

## 2021-04-19 RX ORDER — DEXMEDETOMIDINE HYDROCHLORIDE IN 0.9% SODIUM CHLORIDE 4 UG/ML
0.1 INJECTION INTRAVENOUS
Qty: 400 | Refills: 0 | Status: DISCONTINUED | OUTPATIENT
Start: 2021-04-19 | End: 2021-04-20

## 2021-04-19 RX ORDER — ROCURONIUM BROMIDE 10 MG/ML
50 VIAL (ML) INTRAVENOUS ONCE
Refills: 0 | Status: COMPLETED | OUTPATIENT
Start: 2021-04-19 | End: 2021-04-19

## 2021-04-19 RX ORDER — POTASSIUM PHOSPHATE, MONOBASIC POTASSIUM PHOSPHATE, DIBASIC 236; 224 MG/ML; MG/ML
15 INJECTION, SOLUTION INTRAVENOUS ONCE
Refills: 0 | Status: COMPLETED | OUTPATIENT
Start: 2021-04-19 | End: 2021-04-19

## 2021-04-19 RX ADMIN — MIDODRINE HYDROCHLORIDE 10 MILLIGRAM(S): 2.5 TABLET ORAL at 12:14

## 2021-04-19 RX ADMIN — MIDAZOLAM HYDROCHLORIDE 1.68 MG/KG/HR: 1 INJECTION, SOLUTION INTRAMUSCULAR; INTRAVENOUS at 10:59

## 2021-04-19 RX ADMIN — FENTANYL CITRATE 33.5 MICROGRAM(S)/KG/HR: 50 INJECTION INTRAVENOUS at 03:16

## 2021-04-19 RX ADMIN — MIDODRINE HYDROCHLORIDE 5 MILLIGRAM(S): 2.5 TABLET ORAL at 06:21

## 2021-04-19 RX ADMIN — CEFEPIME 100 MILLIGRAM(S): 1 INJECTION, POWDER, FOR SOLUTION INTRAMUSCULAR; INTRAVENOUS at 06:22

## 2021-04-19 RX ADMIN — CHLORHEXIDINE GLUCONATE 15 MILLILITER(S): 213 SOLUTION TOPICAL at 18:45

## 2021-04-19 RX ADMIN — Medication 50 MILLIGRAM(S): at 06:22

## 2021-04-19 RX ADMIN — POTASSIUM PHOSPHATE, MONOBASIC POTASSIUM PHOSPHATE, DIBASIC 62.5 MILLIMOLE(S): 236; 224 INJECTION, SOLUTION INTRAVENOUS at 06:14

## 2021-04-19 RX ADMIN — Medication 81 MILLIGRAM(S): at 12:14

## 2021-04-19 RX ADMIN — Medication 650 MILLIGRAM(S): at 13:30

## 2021-04-19 RX ADMIN — PHENYLEPHRINE HYDROCHLORIDE 23.6 MICROGRAM(S)/KG/MIN: 10 INJECTION INTRAVENOUS at 13:59

## 2021-04-19 RX ADMIN — ALBUTEROL 2 PUFF(S): 90 AEROSOL, METERED ORAL at 03:57

## 2021-04-19 RX ADMIN — Medication 650 MILLIGRAM(S): at 05:36

## 2021-04-19 RX ADMIN — CHLORHEXIDINE GLUCONATE 1 APPLICATION(S): 213 SOLUTION TOPICAL at 06:22

## 2021-04-19 RX ADMIN — CHLORHEXIDINE GLUCONATE 15 MILLILITER(S): 213 SOLUTION TOPICAL at 06:22

## 2021-04-19 RX ADMIN — Medication 1: at 19:22

## 2021-04-19 RX ADMIN — Medication 40 MILLIGRAM(S): at 18:48

## 2021-04-19 RX ADMIN — PANTOPRAZOLE SODIUM 40 MILLIGRAM(S): 20 TABLET, DELAYED RELEASE ORAL at 12:14

## 2021-04-19 RX ADMIN — Medication 650 MILLIGRAM(S): at 12:14

## 2021-04-19 RX ADMIN — ENOXAPARIN SODIUM 40 MILLIGRAM(S): 100 INJECTION SUBCUTANEOUS at 18:46

## 2021-04-19 RX ADMIN — FENTANYL CITRATE 33.5 MICROGRAM(S)/KG/HR: 50 INJECTION INTRAVENOUS at 12:17

## 2021-04-19 RX ADMIN — MIDAZOLAM HYDROCHLORIDE 1.68 MG/KG/HR: 1 INJECTION, SOLUTION INTRAMUSCULAR; INTRAVENOUS at 18:46

## 2021-04-19 RX ADMIN — POLYETHYLENE GLYCOL 3350 17 GRAM(S): 17 POWDER, FOR SOLUTION ORAL at 12:14

## 2021-04-19 RX ADMIN — Medication 650 MILLIGRAM(S): at 03:15

## 2021-04-19 RX ADMIN — MIDAZOLAM HYDROCHLORIDE 1.68 MG/KG/HR: 1 INJECTION, SOLUTION INTRAMUSCULAR; INTRAVENOUS at 03:15

## 2021-04-19 RX ADMIN — Medication 40 MILLIEQUIVALENT(S): at 06:14

## 2021-04-19 RX ADMIN — Medication 40 MILLIGRAM(S): at 06:22

## 2021-04-19 RX ADMIN — ALBUTEROL 2 PUFF(S): 90 AEROSOL, METERED ORAL at 16:22

## 2021-04-19 RX ADMIN — Medication 50 MILLIGRAM(S): at 23:33

## 2021-04-19 RX ADMIN — ALBUTEROL 2 PUFF(S): 90 AEROSOL, METERED ORAL at 20:22

## 2021-04-19 RX ADMIN — ALBUTEROL 2 PUFF(S): 90 AEROSOL, METERED ORAL at 11:21

## 2021-04-19 RX ADMIN — Medication 1000 MILLIGRAM(S): at 12:17

## 2021-04-19 RX ADMIN — MIDODRINE HYDROCHLORIDE 10 MILLIGRAM(S): 2.5 TABLET ORAL at 18:45

## 2021-04-19 NOTE — PROGRESS NOTE ADULT - SUBJECTIVE AND OBJECTIVE BOX
Pt is sedated, intubated. Pigtail in place. Thoracic surg following.     INTERVAL HPI/OVERNIGHT EVENTS:    VITAL SIGNS:  T(F): 99.7 (04-19-21 @ 11:00)  HR: 101 (04-19-21 @ 11:15)  BP: 116/53 (04-19-21 @ 11:15)  RR: 18 (04-19-21 @ 11:15)  SpO2: 96% (04-19-21 @ 11:15)  Wt(kg): --  I&O's Detail    18 Apr 2021 07:01  -  19 Apr 2021 07:00  --------------------------------------------------------  IN:    Enteral Tube Flush: 260 mL    FentaNYL: 792.5 mL    Glucerna 1.5: 525 mL    IV PiggyBack: 400 mL    Midazolam: 312 mL    Phenylephrine: 729.7 mL  Total IN: 3019.2 mL    OUT:    Chest Tube (mL): 75 mL    Incontinent per Condom Catheter (mL): 2065 mL  Total OUT: 2140 mL    Total NET: 879.2 mL      19 Apr 2021 07:01  -  19 Apr 2021 12:22  --------------------------------------------------------  IN:    FentaNYL: 134 mL    Glucerna 1.5: 100 mL    Midazolam: 54 mL    Phenylephrine: 126 mL  Total IN: 414 mL    OUT:    Incontinent per Condom Catheter (mL): 295 mL  Total OUT: 295 mL    Total NET: 119 mL        Mode: AC/ CMV (Assist Control/ Continuous Mandatory Ventilation)  RR (machine): 30  TV (machine): 420  FiO2: 80  PEEP: 8  ITime: 1  MAP: 16  PC: 22  PIP: 30      REVIEW OF SYSTEMS:    CONSTITUTIONAL:  No fevers, chills, sweats    HEENT:  Eyes:  No diplopia or blurred vision. ENT:  No earache, sore throat or runny nose.    CARDIOVASCULAR:  No pressure, squeezing, tightness, or heaviness about the chest; no palpitations.    RESPIRATORY:  Per HPI    GASTROINTESTINAL:  No abdominal pain, nausea, vomiting or diarrhea.    GENITOURINARY:  No dysuria, frequency or urgency.    NEUROLOGIC:  No paresthesias, fasciculations, seizures or weakness.    PSYCHIATRIC:  No disorder of thought or mood.    PHYSICAL EXAM:    Constitutional: Well developed and nourished  Eyes: Perrla  ENMT: normal  Neck: supple  Respiratory:  ventilator support   Cardiovascular: S1 S2 regular  Gastrointestinal: Soft, Non tender  Extremities: No edema  Vascular: normal  Neurological: sedated.   Musculoskeletal: Normal      MEDICATIONS  (STANDING):  acetaminophen    Suspension .. 650 milliGRAM(s) Oral <User Schedule>  ALBUTerol    90 MICROgram(s) HFA Inhaler 2 Puff(s) Inhalation every 6 hours  ascorbic acid 1000 milliGRAM(s) Oral daily  aspirin  chewable 81 milliGRAM(s) Oral daily  cefepime   IVPB 1000 milliGRAM(s) IV Intermittent every 12 hours  cefepime   IVPB      chlorhexidine 0.12% Liquid 15 milliLiter(s) Oral Mucosa two times a day  chlorhexidine 2% Cloths 1 Application(s) Topical <User Schedule>  fentaNYL   Infusion 3.994 MICROgram(s)/kG/Hr (33.5 mL/Hr) IV Continuous <Continuous>  furosemide   Injectable 40 milliGRAM(s) IV Push two times a day  hydrochlorothiazide 50 milliGRAM(s) Enteral Tube daily  insulin lispro (ADMELOG) corrective regimen sliding scale   SubCutaneous every 6 hours  midazolam Infusion 0.02 mG/kG/Hr (1.68 mL/Hr) IV Continuous <Continuous>  midodrine. 10 milliGRAM(s) Oral three times a day  pantoprazole  Injectable 40 milliGRAM(s) IV Push daily  phenylephrine    Infusion 1.5 MICROgram(s)/kG/Min (23.6 mL/Hr) IV Continuous <Continuous>  polyethylene glycol 3350 17 Gram(s) Oral daily  senna 2 Tablet(s) Oral at bedtime    MEDICATIONS  (PRN):  artificial  tears Solution 1 Drop(s) Both EYES every 4 hours PRN Dry Eyes  LORazepam     Tablet 1 milliGRAM(s) Oral every 4 hours PRN Agitation  sodium chloride 0.9% lock flush 10 milliLiter(s) IV Push every 1 hour PRN Pre/post blood products, medications, blood draw, and to maintain line patency      Allergies    No Known Allergies    Intolerances      LABS:                        8.8    10.23 )-----------( 485      ( 19 Apr 2021 04:14 )             29.8     04-19    143  |  96  |  25<H>  ----------------------------<  127<H>  3.4<L>   |  48<HH>  |  0.46<L>    Ca    8.6      19 Apr 2021 04:14  Phos  2.0     04-19  Mg     2.3     04-19    TPro  6.3  /  Alb  2.7<L>  /  TBili  0.5  /  DBili  x   /  AST  30  /  ALT  31  /  AlkPhos  98  04-19    PT/INR - ( 19 Apr 2021 04:14 )   PT: 14.2 sec;   INR: 1.20 ratio         ABG - ( 19 Apr 2021 04:45 )  pH, Arterial: 7.45  pH, Blood: x     /  pCO2: 79    /  pO2: 87    / HCO3: 55    / Base Excess: 25.5  /  SaO2: 98          CAPILLARY BLOOD GLUCOSE    pro-bnp -- 04-19 @ 04:14     d-dimer 2394  04-19 @ 04:14  pro-bnp -- 04-18 @ 06:39     d-dimer 1778  04-18 @ 06:39  pro-bnp -- 04-15 @ 06:08     d-dimer 841  04-15 @ 06:08      RADIOLOGY & ADDITIONAL TESTS:    CXR:  IMPRESSION:  Changing pulmonary infiltrates.    Thank you for the courtesy of this referral.    Ct scan chest:  IMPRESSION: Unremarkable noncontrast CT of the brain.    ekg;    echo:

## 2021-04-19 NOTE — PROGRESS NOTE ADULT - SUBJECTIVE AND OBJECTIVE BOX
55 yr old  man , non smoker with  moody 1990s presented 3/14 with x9 days worsening cough, subjective fevers, and SOB, with x2-3 days dysuria and central, non-radiating, constant CP. Admitted to medicine unit  for acute hypoxic respiratory failure secondary to pna from covid-19 infection . Pt s/p pigtail chest catheter for ptx, minimal output.  Pt planned for tracheostomy tube placement for prolonged respiratory failure.  ICU Vital Signs Last 24 Hrs  T(C): 36.4 (19 Apr 2021 17:15), Max: 37.6 (19 Apr 2021 11:00)  T(F): 97.6 (19 Apr 2021 17:15), Max: 99.7 (19 Apr 2021 11:00)  HR: 109 (19 Apr 2021 16:24) (63 - 119)  BP: 124/57 (19 Apr 2021 16:15) (94/49 - 138/58)  BP(mean): 73 (19 Apr 2021 16:15) (58 - 83)  ABP: --  ABP(mean): --  RR: 10 (19 Apr 2021 16:15) (10 - 32)  SpO2: 95% (19 Apr 2021 16:24) (90% - 100%)  Ventolated,sedated.  Lungs: good air entry bilat  abd: soft nt nd                          8.8    10.23 )-----------( 485      ( 19 Apr 2021 04:14 )             29.8     04-19    143  |  96  |  25<H>  ----------------------------<  127<H>  3.4<L>   |  48<HH>  |  0.46<L>    Ca    8.6      19 Apr 2021 04:14  Phos  2.0     04-19  Mg     2.3     04-19    TPro  6.3  /  Alb  2.7<L>  /  TBili  0.5  /  DBili  x   /  AST  30  /  ALT  31  /  AlkPhos  98  04-19    < from: Xray Chest 1 View- PORTABLE-Routine (Xray Chest 1 View- PORTABLE-Routine in AM.) (04.19.21 @ 07:28) >  FINDINGS: ET tube tip above tracheal bifurcation.  NG tube tip beyond GE junction.  RIGHT IJ catheter tip in SVC.  LEFT multi-sideholepigtail catheter overlies LEFT lower hemithorax.  LEFT lung reexpanded.    The lungs show unchanged bilateral  multifocal and diffuse ill-defined airspace opacities.    The heart and mediastinum are within normal limits.    Visualized osseous structures are intact.      IMPRESSION: Insertion of LEFT chest tube with reexpansion of LEFT lung.   Catheters and tubes in place.  Unchanged bilateral ffuse airspace disease..    < end of copied text >

## 2021-04-19 NOTE — PROGRESS NOTE ADULT - SUBJECTIVE AND OBJECTIVE BOX
INTERVAL HPI/OVERNIGHT EVENTS:    No acute events overnight.   vented/sedated      MEDICATIONS  (STANDING):  acetaminophen    Suspension .. 650 milliGRAM(s) Oral <User Schedule>  ALBUTerol    90 MICROgram(s) HFA Inhaler 2 Puff(s) Inhalation every 6 hours  ascorbic acid 1000 milliGRAM(s) Oral daily  aspirin  chewable 81 milliGRAM(s) Oral daily  cefepime   IVPB 1000 milliGRAM(s) IV Intermittent every 12 hours  cefepime   IVPB      chlorhexidine 0.12% Liquid 15 milliLiter(s) Oral Mucosa two times a day  chlorhexidine 2% Cloths 1 Application(s) Topical <User Schedule>  fentaNYL   Infusion 3.994 MICROgram(s)/kG/Hr (33.5 mL/Hr) IV Continuous <Continuous>  furosemide   Injectable 40 milliGRAM(s) IV Push two times a day  hydrochlorothiazide 50 milliGRAM(s) Enteral Tube daily  insulin lispro (ADMELOG) corrective regimen sliding scale   SubCutaneous every 6 hours  midazolam Infusion 0.02 mG/kG/Hr (1.68 mL/Hr) IV Continuous <Continuous>  midodrine. 10 milliGRAM(s) Oral three times a day  pantoprazole  Injectable 40 milliGRAM(s) IV Push daily  phenylephrine    Infusion 1.5 MICROgram(s)/kG/Min (23.6 mL/Hr) IV Continuous <Continuous>  polyethylene glycol 3350 17 Gram(s) Oral daily  senna 2 Tablet(s) Oral at bedtime    MEDICATIONS  (PRN):  artificial  tears Solution 1 Drop(s) Both EYES every 4 hours PRN Dry Eyes  LORazepam     Tablet 1 milliGRAM(s) Oral every 4 hours PRN Agitation  sodium chloride 0.9% lock flush 10 milliLiter(s) IV Push every 1 hour PRN Pre/post blood products, medications, blood draw, and to maintain line patency      Vital Signs Last 24 Hrs  T(C): 37.3 (19 Apr 2021 07:00), Max: 37.4 (19 Apr 2021 04:00)  T(F): 99.1 (19 Apr 2021 07:00), Max: 99.4 (19 Apr 2021 04:00)  HR: 85 (19 Apr 2021 08:45) (63 - 133)  BP: 136/52 (19 Apr 2021 08:45) (76/44 - 136/52)  BP(mean): 72 (19 Apr 2021 08:45) (51 - 84)  RR: 27 (19 Apr 2021 08:45) (10 - 32)  SpO2: 98% (19 Apr 2021 08:45) (89% - 100%)      PHYSICAL EXAM  General: sedated  Resp: vented  Chest: L pigtail in place, intermittent airleak    I&O's Detail    18 Apr 2021 07:01  -  19 Apr 2021 07:00  --------------------------------------------------------  IN:    Enteral Tube Flush: 260 mL    FentaNYL: 792.5 mL    Glucerna 1.5: 525 mL    IV PiggyBack: 400 mL    Midazolam: 312 mL    Phenylephrine: 729.7 mL  Total IN: 3019.2 mL    OUT:    Chest Tube (mL): 75 mL    Incontinent per Condom Catheter (mL): 2065 mL  Total OUT: 2140 mL    Total NET: 879.2 mL      19 Apr 2021 07:01  -  19 Apr 2021 09:32  --------------------------------------------------------  IN:    FentaNYL: 67 mL    Glucerna 1.5: 25 mL    Midazolam: 27 mL    Phenylephrine: 63 mL  Total IN: 182 mL    OUT:    Incontinent per Condom Catheter (mL): 145 mL  Total OUT: 145 mL    Total NET: 37 mL          LABS:                        8.8    10.23 )-----------( 485      ( 19 Apr 2021 04:14 )             29.8             04-19    143  |  96  |  25<H>  ----------------------------<  127<H>  3.4<L>   |  48<HH>  |  0.46<L>    Ca    8.6      19 Apr 2021 04:14  Phos  2.0     04-19  Mg     2.3     04-19    TPro  6.3  /  Alb  2.7<L>  /  TBili  0.5  /  DBili  x   /  AST  30  /  ALT  31  /  AlkPhos  98  04-19

## 2021-04-19 NOTE — PROGRESS NOTE ADULT - ASSESSMENT
55 yoM with L ptx s/p pigtail replacement 4/18    keep pigtail in place while intubated/vented  intermittent airleak, continue low continuous suction  plan per ICU

## 2021-04-19 NOTE — PROGRESS NOTE ADULT - SUBJECTIVE AND OBJECTIVE BOX
Patient is a 55y old  Male who presents with a chief complaint of SOB (18 Apr 2021 11:52)    pt seen in icu [ x ], reg med floor [   ], bed [ x ], chair at bedside [   ], a+o x3 [  ], sedated [x  ],  nad [x  ]    andrea [ x ], ngt feed [x  ], et tube [ x ], cent line [x  ],          Allergies    No Known Allergies        Vitals    T(F): 99.4 (04-19-21 @ 04:00), Max: 99.4 (04-19-21 @ 04:00)  HR: 72 (04-19-21 @ 04:15) (63 - 133)  BP: 122/56 (04-19-21 @ 04:15) (76/44 - 141/75)  RR: 30 (04-19-21 @ 04:15) (10 - 32)  SpO2: 96% (04-19-21 @ 04:15) (86% - 100%)  Wt(kg): --  CAPILLARY BLOOD GLUCOSE      POCT Blood Glucose.: 100 mg/dL (18 Apr 2021 06:24)      Labs                          8.8    10.23 )-----------( 485      ( 19 Apr 2021 04:14 )             29.8       04-19    143  |  96  |  25<H>  ----------------------------<  127<H>  3.4<L>   |  48<HH>  |  0.46<L>    Ca    8.6      19 Apr 2021 04:14  Phos  2.0     04-19  Mg     2.3     04-19    TPro  6.3  /  Alb  2.7<L>  /  TBili  0.5  /  DBili  x   /  AST  30  /  ALT  31  /  AlkPhos  98  04-19            .Sputum Sputum  04-16 @ 04:42   Moderate Enterobacter aerogenes (Carbapenem Resistant)  Normal Respiratory Monserrat present  --  Enterobacter aerogenes (Carbapenem Resistant)      .Urine Clean Catch (Midstream)  03-15 @ 00:52   No growth  --  --          Radiology Results      Meds    MEDICATIONS  (STANDING):  acetaminophen    Suspension .. 650 milliGRAM(s) Oral <User Schedule>  ALBUTerol    90 MICROgram(s) HFA Inhaler 2 Puff(s) Inhalation every 6 hours  ascorbic acid 1000 milliGRAM(s) Oral daily  aspirin  chewable 81 milliGRAM(s) Oral daily  cefepime   IVPB 1000 milliGRAM(s) IV Intermittent every 12 hours  cefepime   IVPB      chlorhexidine 0.12% Liquid 15 milliLiter(s) Oral Mucosa two times a day  chlorhexidine 2% Cloths 1 Application(s) Topical <User Schedule>  enoxaparin Injectable 40 milliGRAM(s) SubCutaneous daily  fentaNYL   Infusion 3.994 MICROgram(s)/kG/Hr (33.5 mL/Hr) IV Continuous <Continuous>  furosemide   Injectable 40 milliGRAM(s) IV Push daily  hydrochlorothiazide 50 milliGRAM(s) Enteral Tube daily  insulin lispro (ADMELOG) corrective regimen sliding scale   SubCutaneous every 6 hours  midazolam Infusion 0.02 mG/kG/Hr (1.68 mL/Hr) IV Continuous <Continuous>  midodrine 5 milliGRAM(s) Oral every 8 hours  pantoprazole  Injectable 40 milliGRAM(s) IV Push daily  phenylephrine    Infusion 1.5 MICROgram(s)/kG/Min (23.6 mL/Hr) IV Continuous <Continuous>  polyethylene glycol 3350 17 Gram(s) Oral daily  senna 2 Tablet(s) Oral at bedtime      MEDICATIONS  (PRN):  artificial  tears Solution 1 Drop(s) Both EYES every 4 hours PRN Dry Eyes  LORazepam     Tablet 1 milliGRAM(s) Oral every 4 hours PRN Agitation  sodium chloride 0.9% lock flush 10 milliLiter(s) IV Push every 1 hour PRN Pre/post blood products, medications, blood draw, and to maintain line patency      Physical Exam    Neuro :  no focal deficits  Respiratory: CTA B/L  CV: RRR, S1S2, no murmurs,   Abdominal: Soft, NT, ND +BS,  Extremities: No edema, + peripheral pulses      ASSESSMENT    Hypoxemia 2nd to covid pna   transaminitis  prediabetes  h/o appendectomy  cholecystectomy        PLAN    contact and airborne isolation  d/c remdesevir given covid ab positive noted   completed dexamethasone   started pulse steroids for 3 days - 250mg solumedrol bid now tapered off 4/14/21  cont asa, vit c,    cont albuterol inhaler   pulm f/u  procalcitonin, D-dimer, crp, ldh, ferritin, lactate noted ,    tmx 99.8  cont tylenol prn,   cont robitussin prn   pt on fentanyl, phenylephrine, midazolam drip  s/p intubation 3/29/21  O2 sat (87% - 97%) mech vent 70%  O2 via mech vent and taper fio2 as tolerated   vent mgmt as per icu  xray 3/19/21 with pneumomediastinum  rept cxr with New trace right apical pneumothorax. New mild left apical pneumothorax. Grossly stable small pneumomediastinum.  Soft tissue emphysema at the neck bases bilaterally. Grossly stable bilateral pulmonary infiltrates noted.   cxr 2/24 with No evidence of pneumothorax can be appreciated on the available image. This may be related to patient positioning. Evidence of pneumomediastinum and subcutaneous emphysema in the lower neck is again noted. There are patchy bibasilar infiltrates and elevated right hemidiaphragm noted.   cxr 3/29/21 with No significant change bilateral infiltrates. There is a small simple left apical pneumothorax. No significant pleural effusion. Bilateral subcutaneous emphysema similar to prior.   pt intubated 6AM 3/29/21,   XRs on 7AM and 5PM with no obvious increase of ptx  cxr 4/4/21 with Improving bilateral airspace disease noted    cxr 4/8/21 with Small left pneumothorax noted.  thoracic surg f/u   s/p L chest tube placement  CXR 4/11/21 with : No interval change compared to one day prior. No pneumothorax noted above.   Daily CXR  Monitor O2 status   pt for possible trach next week if fio2 <50%  id f/u   Continue Meropenem 1g iv q8  f/u blood cx  andrea d/c'd  lispro ss   prognosis poor  cont current meds  mgmt as per icu          Patient is a 55y old  Male who presents with a chief complaint of SOB (18 Apr 2021 11:52)    pt seen in icu [ x ], reg med floor [   ], bed [ x ], chair at bedside [   ], a+o x3 [  ], sedated [x  ],  nad [x  ]    andrea [ x ], ngt feed [x  ], et tube [ x ], cent line [x  ],          Allergies    No Known Allergies        Vitals    T(F): 99.4 (04-19-21 @ 04:00), Max: 99.4 (04-19-21 @ 04:00)  HR: 72 (04-19-21 @ 04:15) (63 - 133)  BP: 122/56 (04-19-21 @ 04:15) (76/44 - 141/75)  RR: 30 (04-19-21 @ 04:15) (10 - 32)  SpO2: 96% (04-19-21 @ 04:15) (86% - 100%)  Wt(kg): --  CAPILLARY BLOOD GLUCOSE      POCT Blood Glucose.: 100 mg/dL (18 Apr 2021 06:24)      Labs                          8.8    10.23 )-----------( 485      ( 19 Apr 2021 04:14 )             29.8       04-19    143  |  96  |  25<H>  ----------------------------<  127<H>  3.4<L>   |  48<HH>  |  0.46<L>    Ca    8.6      19 Apr 2021 04:14  Phos  2.0     04-19  Mg     2.3     04-19    TPro  6.3  /  Alb  2.7<L>  /  TBili  0.5  /  DBili  x   /  AST  30  /  ALT  31  /  AlkPhos  98  04-19            .Sputum Sputum  04-16 @ 04:42   Moderate Enterobacter aerogenes (Carbapenem Resistant)  Normal Respiratory Monserrat present  --  Enterobacter aerogenes (Carbapenem Resistant)      .Urine Clean Catch (Midstream)  03-15 @ 00:52   No growth  --  --          Radiology Results        Meds    MEDICATIONS  (STANDING):  acetaminophen    Suspension .. 650 milliGRAM(s) Oral <User Schedule>  ALBUTerol    90 MICROgram(s) HFA Inhaler 2 Puff(s) Inhalation every 6 hours  ascorbic acid 1000 milliGRAM(s) Oral daily  aspirin  chewable 81 milliGRAM(s) Oral daily  cefepime   IVPB 1000 milliGRAM(s) IV Intermittent every 12 hours  cefepime   IVPB      chlorhexidine 0.12% Liquid 15 milliLiter(s) Oral Mucosa two times a day  chlorhexidine 2% Cloths 1 Application(s) Topical <User Schedule>  enoxaparin Injectable 40 milliGRAM(s) SubCutaneous daily  fentaNYL   Infusion 3.994 MICROgram(s)/kG/Hr (33.5 mL/Hr) IV Continuous <Continuous>  furosemide   Injectable 40 milliGRAM(s) IV Push daily  hydrochlorothiazide 50 milliGRAM(s) Enteral Tube daily  insulin lispro (ADMELOG) corrective regimen sliding scale   SubCutaneous every 6 hours  midazolam Infusion 0.02 mG/kG/Hr (1.68 mL/Hr) IV Continuous <Continuous>  midodrine 5 milliGRAM(s) Oral every 8 hours  pantoprazole  Injectable 40 milliGRAM(s) IV Push daily  phenylephrine    Infusion 1.5 MICROgram(s)/kG/Min (23.6 mL/Hr) IV Continuous <Continuous>  polyethylene glycol 3350 17 Gram(s) Oral daily  senna 2 Tablet(s) Oral at bedtime      MEDICATIONS  (PRN):  artificial  tears Solution 1 Drop(s) Both EYES every 4 hours PRN Dry Eyes  LORazepam     Tablet 1 milliGRAM(s) Oral every 4 hours PRN Agitation  sodium chloride 0.9% lock flush 10 milliLiter(s) IV Push every 1 hour PRN Pre/post blood products, medications, blood draw, and to maintain line patency      Physical Exam    Neuro :  no focal deficits  Respiratory: CTA B/L  CV: RRR, S1S2, no murmurs,   Abdominal: Soft, NT, ND +BS,  Extremities: No edema, + peripheral pulses      ASSESSMENT    Hypoxemia 2nd to covid pna   transaminitis  prediabetes  h/o appendectomy  cholecystectomy        PLAN    contact and airborne isolation  d/c remdesevir given covid ab positive noted   completed dexamethasone   started pulse steroids for 3 days - 250mg solumedrol bid now tapered off 4/14/21  cont asa, vit c,    cont albuterol inhaler   pulm f/u  procalcitonin, D-dimer, crp, ldh, ferritin, lactate noted ,    tmx 99.4  cont tylenol prn,   cont robitussin prn   pt on fentanyl, phenylephrine, midazolam drip  s/p intubation 3/29/21  O2 sat (86% - 100%) mech vent 80%  O2 via mech vent and taper fio2 as tolerated   vent mgmt as per icu  xray 3/19/21 with pneumomediastinum  rept cxr with New trace right apical pneumothorax. New mild left apical pneumothorax. Grossly stable small pneumomediastinum.  Soft tissue emphysema at the neck bases bilaterally. Grossly stable bilateral pulmonary infiltrates noted.   cxr 2/24 with No evidence of pneumothorax can be appreciated on the available image. This may be related to patient positioning. Evidence of pneumomediastinum and subcutaneous emphysema in the lower neck is again noted. There are patchy bibasilar infiltrates and elevated right hemidiaphragm noted.   cxr 3/29/21 with No significant change bilateral infiltrates. There is a small simple left apical pneumothorax. No significant pleural effusion. Bilateral subcutaneous emphysema similar to prior.   pt intubated 6AM 3/29/21,   XRs on 7AM and 5PM with no obvious increase of ptx  cxr 4/4/21 with Improving bilateral airspace disease noted    cxr 4/8/21 with Small left pneumothorax noted.  thoracic surg f/u   s/p L chest tube replacement 4/18/21 for recurrence of left pneumothorax   f/u cxr   CXR 4/11/21 with : No interval change compared to one day prior. No pneumothorax noted.   Daily CXR  Monitor O2 status   pt for possible trach when fio2 <50%  id f/u   Continue Meropenem 1g iv q8  f/u blood cx   speutum cx with enterobacter noted above  andrea   lispro ss   prognosis poor  cont current meds  mgmt as per icu

## 2021-04-19 NOTE — PROGRESS NOTE ADULT - SUBJECTIVE AND OBJECTIVE BOX
ICU VISIT    55y Male is under our care for     REVIEW OF SYSTEMS:  [  ] Not able to elicit  General:	  Chest:	  GI:	  :  Skin:	  Musculoskeletal:	  Neuro:	    MEDS:  cefepime   IVPB 1000 milliGRAM(s) IV Intermittent every 12 hours  cefepime   IVPB        ALLERGIES: Allergies    No Known Allergies    Intolerances        VITALS:  Vital Signs Last 24 Hrs  T(C): 37.2 (19 Apr 2021 12:15), Max: 37.6 (19 Apr 2021 11:00)  T(F): 99 (19 Apr 2021 12:15), Max: 99.7 (19 Apr 2021 11:00)  HR: 117 (19 Apr 2021 13:15) (63 - 117)  BP: 132/58 (19 Apr 2021 13:15) (94/49 - 138/58)  BP(mean): 73 (19 Apr 2021 13:15) (58 - 83)  RR: 30 (19 Apr 2021 13:15) (14 - 32)  SpO2: 91% (19 Apr 2021 13:15) (91% - 100%)      PHYSICAL EXAM:  HEENT:  Neck:  Respiratory:  Cardiovascular:  Gastrointestinal:  Extremities:  Skin:  Ortho:  Neuro:    LABS/DIAGNOSTIC TESTS:                        8.8    10.23 )-----------( 485      ( 19 Apr 2021 04:14 )             29.8     WBC Count: 10.23 K/uL (04-19 @ 04:14)  WBC Count: 11.62 K/uL (04-18 @ 06:39)  WBC Count: 8.49 K/uL (04-17 @ 05:59)  WBC Count: 8.02 K/uL (04-16 @ 17:25)  WBC Count: 6.37 K/uL (04-16 @ 04:40)    04-19    143  |  96  |  25<H>  ----------------------------<  127<H>  3.4<L>   |  48<HH>  |  0.46<L>    Ca    8.6      19 Apr 2021 04:14  Phos  2.0     04-19  Mg     2.3     04-19    TPro  6.3  /  Alb  2.7<L>  /  TBili  0.5  /  DBili  x   /  AST  30  /  ALT  31  /  AlkPhos  98  04-19      CULTURES:   .Sputum Sputum  04-16 @ 04:42   Moderate Enterobacter aerogenes (Carbapenem Resistant)  Normal Respiratory Monserrat present  --  Enterobacter aerogenes (Carbapenem Resistant)      .Urine Clean Catch (Midstream)  03-15 @ 00:52   No growth  --  --        RADIOLOGY:  < from: Xray Chest 1 View- PORTABLE-Routine (Xray Chest 1 View- PORTABLE-Routine in AM.) (04.19.21 @ 07:28) >    EXAM:  XR CHEST PORTABLE ROUTINE 1V                          EXAM:  XR CHEST PORTABLE URGENT 1V                            PROCEDURE DATE:  04/18/2021          INTERPRETATION:  Portable chest radiograph    CLINICAL INFORMATION: LEFT chest tube. Follow-up    TECHNIQUE:  Portable  AP view of the chest was obtained.    COMPARISON: 4/18/2021 available for review.    FINDINGS: ET tube tip above tracheal bifurcation.  NG tube tip beyond GE junction.  RIGHT IJ catheter tip in SVC.  LEFT multi-sideholepigtail catheter overlies LEFT lower hemithorax.  LEFT lung reexpanded.    The lungs show unchanged bilateral  multifocal and diffuse ill-defined airspace opacities.    The heart and mediastinum are within normal limits.    Visualized osseous structures are intact.      IMPRESSION: Insertion of LEFT chest tube with reexpansion of LEFT lung.   Catheters and tubes in place.  Unchanged bilateral ffuse airspace disease..    FOLLOW-UP CHEST RADIOGRAPH AP PORTABLE 4/19/2021 6:42 AM.  There is no interval change.      < end of copied text >   ICU VISIT    55y Male is under our care for pneumonia and fevers.  Patient was seen in the ICU still intubated and vent dependent with an FI02 of 70% and an oxygen saturation of 93%.  Patient is currently on one pressor with a blood pressure of 129/67.  Patients chest x-ray from 4/18 showed a large left pneumothorax, so a chest tube was placed. Patients sputum culture is positive for enterobacter aerogenes.  Patient remains afebrile and WBC count is WNL.    REVIEW OF SYSTEMS:  [ x ] Not able to elicit      MEDS:  cefepime   IVPB 1000 milliGRAM(s) IV Intermittent every 12 hours  cefepime   IVPB        ALLERGIES: Allergies    No Known Allergies    Intolerances        VITALS:  Vital Signs Last 24 Hrs  T(C): 37.2 (19 Apr 2021 12:15), Max: 37.6 (19 Apr 2021 11:00)  T(F): 99 (19 Apr 2021 12:15), Max: 99.7 (19 Apr 2021 11:00)  HR: 117 (19 Apr 2021 13:15) (63 - 117)  BP: 132/58 (19 Apr 2021 13:15) (94/49 - 138/58)  BP(mean): 73 (19 Apr 2021 13:15) (58 - 83)  RR: 30 (19 Apr 2021 13:15) (14 - 32)  SpO2: 91% (19 Apr 2021 13:15) (91% - 100%)      PHYSICAL EXAM:  Constitutional: ETT  HEENT: normocephalic with moist oral mucosa  Neck: supple, right IJ, no JVD  Respiratory: lungs clear no rales no rhonchi  Cardiovascular: S1 S2 reg no murmurs  Gastrointestinal: +BS with soft, nondistended abdomen; nontender  : Cerda catheter in place  Extremities: bilateral arm and hand edema + 2  Skin: no rashes  Ortho: no jt swelling  Neuro: AAO x 0    LABS/DIAGNOSTIC TESTS:                        8.8    10.23 )-----------( 485      ( 19 Apr 2021 04:14 )             29.8     WBC Count: 10.23 K/uL (04-19 @ 04:14)  WBC Count: 11.62 K/uL (04-18 @ 06:39)  WBC Count: 8.49 K/uL (04-17 @ 05:59)  WBC Count: 8.02 K/uL (04-16 @ 17:25)  WBC Count: 6.37 K/uL (04-16 @ 04:40)    04-19    143  |  96  |  25<H>  ----------------------------<  127<H>  3.4<L>   |  48<HH>  |  0.46<L>    Ca    8.6      19 Apr 2021 04:14  Phos  2.0     04-19  Mg     2.3     04-19    TPro  6.3  /  Alb  2.7<L>  /  TBili  0.5  /  DBili  x   /  AST  30  /  ALT  31  /  AlkPhos  98  04-19      CULTURES:   .Sputum Sputum  04-16 @ 04:42   Moderate Enterobacter aerogenes (Carbapenem Resistant)  Normal Respiratory Monserrat present  --  Enterobacter aerogenes (Carbapenem Resistant)      .Urine Clean Catch (Midstream)  03-15 @ 00:52   No growth  --  --        RADIOLOGY:  < from: Xray Chest 1 View- PORTABLE-Routine (Xray Chest 1 View- PORTABLE-Routine in AM.) (04.19.21 @ 07:28) >    EXAM:  XR CHEST PORTABLE ROUTINE 1V                          EXAM:  XR CHEST PORTABLE URGENT 1V                            PROCEDURE DATE:  04/18/2021          INTERPRETATION:  Portable chest radiograph    CLINICAL INFORMATION: LEFT chest tube. Follow-up    TECHNIQUE:  Portable  AP view of the chest was obtained.    COMPARISON: 4/18/2021 available for review.    FINDINGS: ET tube tip above tracheal bifurcation.  NG tube tip beyond GE junction.  RIGHT IJ catheter tip in SVC.  LEFT multi-sideholepigtail catheter overlies LEFT lower hemithorax.  LEFT lung reexpanded.    The lungs show unchanged bilateral  multifocal and diffuse ill-defined airspace opacities.    The heart and mediastinum are within normal limits.    Visualized osseous structures are intact.      IMPRESSION: Insertion of LEFT chest tube with reexpansion of LEFT lung.   Catheters and tubes in place.  Unchanged bilateral ffuse airspace disease..    FOLLOW-UP CHEST RADIOGRAPH AP PORTABLE 4/19/2021 6:42 AM.  There is no interval change.      < end of copied text >

## 2021-04-19 NOTE — PROGRESS NOTE ADULT - ASSESSMENT
Pneumonia  Fevers - improved  Leukocytosis - improved  COVID - 19 infection    Plan:   prognosis is poor  Time spent - 32 mins.             Pneumonia  Fevers - improved  Leukocytosis - improved  COVID - 19 infection    Plan: DC Cefepime - as patient completed course of Meropenem  prognosis is poor  Time spent - 32 mins.

## 2021-04-19 NOTE — PROGRESS NOTE ADULT - ATTENDING COMMENTS
55 yr old  man , non smoker with  moody 1990s presented 3/14 with x9 days worsening cough, subjective fevers, and SOB, with x2-3 days dysuria and central, non-radiating, constant CP. Admitted to medicine unit  for acute hypoxic respiratory failure secondary to pna from covid-19 infection .     Assessment:  1. Acute hypoxic respiratory failure  2 Covid-19 infection   3. Transaminitis  4. Prediabetes  5. Bilateral pneumothorax  6. Septic shock     Plan   -Cont. antibiotics ID consultation appreciated  -pat intubated 3/29   -Mechanical ventilation with lung protective strategy, titrate down fio2 as tolerated  -adjust vent as per ABG  -permissive hypercapnia   -Vasopressor support  -PTX  improved post chest tube placement  -chest tube removed 4/15  -left large PTX  -4/18 left chest tube re-placed with left lung re-expansion   -keep chest tube to suction   -for trach and PEG this week   -Cont. precedex and fentanyl  -Thoracic surgery consult for PEG/Trach placement, once hemodynamically stable  -Cont. versed/fentanyl as patient asynchronous with the vent  -Albumin/lasix for diuresis  -increase midodrine 10 mg tid  -isolation : contact and air borne   -monitor biomarkers daily, trending down   -Tube feedings, bowel regimen  -dvt/gi prophy  -hemodynamic monitoring   -Prognosis is guarded . .   -on antibx for PNA . 55 yr old  man , non smoker with  moody 1990s presented 3/14 with x9 days worsening cough, subjective fevers, and SOB, with x2-3 days dysuria and central, non-radiating, constant CP. Admitted to medicine unit  for acute hypoxic respiratory failure secondary to pna from covid-19 infection .     Assessment:  1. Acute hypoxic respiratory failure  2 Covid-19 infection   3. Transaminitis  4. Prediabetes  5. Bilateral pneumothorax  6. Septic shock     Plan   -ID f/u prelim : d/c antibx , will speak with ID attend   -Sputum post for CRE  -isolate   -  -pat intubated 3/29   -Mechanical ventilation with lung protective strategy, titrate down fio2 as tolerated  -adjust vent as per ABG  -permissive hypercapnia   -Vasopressor support  -PTX  improved post chest tube placement  -chest tube removed 4/15  -left large PTX  -4/18 left chest tube re-placed with left lung re-expansion   -keep chest tube to suction   -for trach and PEG this week   -Cont. precedex and fentanyl  -Thoracic surgery consult for PEG/Trach placement, once hemodynamically stable  -Cont. versed/fentanyl as patient asynchronous with the vent  -Albumin/lasix for diuresis  -increase midodrine 10 mg tid  -isolation : contact and air borne   -monitor biomarkers daily, trending down   -Tube feedings, bowel regimen  -dvt/gi prophy  -hemodynamic monitoring   -Prognosis is guarded . .   -on antibx for PNA .

## 2021-04-19 NOTE — PROGRESS NOTE ADULT - ASSESSMENT
1. Acute hypoxic respiratory failure  2. ARDS 2/2 Covid pneumonia  3. Transaminitis  4. Prediabetes  5. Abnormal TSH    =================== Neuro============================  Alert and oriented x 3 at baseline   intubated and sedated 3/29 on Vent  Continue Versed to help with vent synchrony with Fentanyl  CT head unremarkable     ================= Cardiovascular==========================  # Hypotension      C/w midodrine   Restarted on phenylephrine      # TACHYCARDIA: improving  -HR in 90s-100s.   -continue monitoring     ================- Pulm=================================  #Acute hypoxic respiratory failure: secondary to Covid pneumonia  #ARDS  -was on HFNr then got intubated 3/29  -VC Vent setting : 30/400/60/8  -D-dimer initially elevated on presentation, Slowly rising again   - Patient on increased FiO2 requirement, will monitor   - Thoracic surgery will take for Trach and peg once O2 requirements come down   s/p Pigtail on left chest , removed on 4/15  - Large Pneumothorax was noted on 4/18 on left side, s/p chest tube placemet    -Remdesivir was discontinued due to positive antibodies   - c/w supportive care   - trend COVID  markers  - Finished Dexamethasone   - prednisone taper, Finished      # Bacterial Pneumonia  - stable infiltrate on CXR ,likely developed VAP, on Zosyn, s/p 1 dose of Vanco   - continue Meropenem 4/13, f/u ID for further recs  - MRSA negative from 3/26  - sputum Cx gorwing few gram negative rods,   -pulm. dr. Ryan  - ID consult Dr Anand       #Pneumothorax and pneumomediastinum: resolved  -X-ray chest: tiny left apical pneumothorax  -Thoracic surgery following  -s/p Chest tube placed 4/8 pigtail to wall suction.  d/c'd 4/15  -X-ray monitoring daily, PTX resolved      ==================ID===================================  Likely bacterial pneumonia at present  -leukocytosis resolved  -Still spiking fevers,  started on Alternative doses of Motrin and Tylenol for fever control  - Continue Meropenem (D 6), f/u ID for duration   - C-procal elevated  -plan as above     ================= Nephro================================  -pitting edema to both lower arms  -on condom cath   -monitor I/Os   -Net negative , Urine output monitoring  -s/p Albumin x 2 days on 4/10  -On Lasix 40mg BID with Albumin 25% Q6 for 48 hours to help with urine output and fluid status.( 4/15) Albumin finished   - s/p 1 dose of diamox  -C/w HCTZ 50mg daily   -monitor lytes, CMP    =================GI====================================  Transaminitis:   likely secondary to Covid19   - and  on presentation   hepatitis panel -ve  -Improved  -continue to monitor LFT    diet:   NGT (3/29) with tube feed  bowel regimen   Last BM 4/16      ================ Heme==================================  Elevated d-dimer: likely secondary to Covid  -d-dimer 423 on admission  -last D-dimer 841<---1313  -continue prophylactic Lovenox 40 mg daily    Anemia  Hgb 7.3, no active signs of bleeding  repeat CBC pm  f/u iron panel, FOBT  will transfuse  if hgb drops.     =================Endocrine===============================  Prediabetes:    -A1c  5.8  -BS controlled  -continue HSS  -monitor FS while on steroids    Abnormal TSH:  -TSH level noted 0.26,   -repeat TSH 0.37 and Free T4 1.82    ================= Skin/Catheters============================  No rashes. Peripheral IV lines.   RIJ 4/13  RRA 3/29, Removed    - =================Prophylaxis =============================  Lovenox for DVT proph : on hold for now for anemia, may resume when CBC improving  Protonix for  GI proph    ==================GOC==================================   FULL CODE     1. Acute hypoxic respiratory failure  2. ARDS 2/2 Covid pneumonia  3. Transaminitis  4. Prediabetes  5. Abnormal TSH    =================== Neuro============================  Alert and oriented x 3 at baseline   intubated and sedated 3/29 on Vent  Continue Versed to help with vent synchrony with Fentanyl  CT head unremarkable     ================= Cardiovascular==========================  # Hypotension      C/w midodrine   taper off phenylephrine      # TACHYCARDIA: improving  -HR in 90s-100s.   -continue monitoring     ================- Pulm=================================  #Acute hypoxic respiratory failure: secondary to Covid pneumonia  #ARDS  -was on HFNr then got intubated 3/29  -VC Vent setting : 30/400/60/8  -D-dimer initially elevated on presentation, Slowly rising again   - Patient on increased FiO2 requirement, will monitor   - Thoracic surgery will take for Trach and peg once O2 requirements come down   s/p Pigtail on left chest , removed on 4/15  - Large Pneumothorax was noted on 4/18 on left side, s/p chest tube placement    -Remdesivir was discontinued due to positive antibodies   - c/w supportive care   - trend COVID  markers  - Finished Dexamethasone   - prednisone taper, Finished      # Bacterial Pneumonia  - stable infiltrate on CXR ,likely developed VAP, on Zosyn, s/p 1 dose of Vanco   - continue Cefepime 4/19, f/u ID for further recs  - MRSA negative from 3/26  - Sputum Cx growing few gram negative rods,   - Pulm. dr. Ryan  - ID consult Dr Anand       #Pneumothorax and pneumomediastinum: resolved  -X-ray chest: tiny left apical pneumothorax  -Thoracic surgery following  -s/p Chest tube placed 4/8 pigtail to wall suction.  d/c'd 4/15  -X-ray monitoring daily, PTX resolved      ==================ID===================================  Likely bacterial pneumonia at present  -leukocytosis resolved  -Still spiking fevers,  started on Alternative doses of Motrin and Tylenol for fever control  - Continue Meropenem (D 6), f/u ID for duration   - C-procal elevated  -plan as above     ================= Nephro================================  -pitting edema to both lower arms  -on condom cath   -monitor I/Os   -Net negative , Urine output monitoring  -s/p Albumin x 2 days on 4/10  -On Lasix 40mg BID with Albumin 25% Q6 for 48 hours to help with urine output and fluid status.( 4/15) Albumin finished   - s/p 1 dose of diamox  -C/w HCTZ 50mg daily   -monitor lytes, CMP    =================GI====================================  Transaminitis:   likely secondary to Covid19   - and  on presentation   hepatitis panel -ve  -Improved  -continue to monitor LFT    diet:   NGT (3/29) with tube feed  bowel regimen   Last BM 4/16      ================ Heme==================================  Elevated d-dimer: likely secondary to Covid  -d-dimer 423 on admission  -last D-dimer 841<---1313  -continue prophylactic Lovenox 40 mg daily    Anemia  Hgb 7.3, no active signs of bleeding  repeat CBC pm  f/u iron panel, FOBT  will transfuse  if hgb drops.     =================Endocrine===============================  Prediabetes:    -A1c  5.8  -BS controlled  -continue HSS  -monitor FS while on steroids    Abnormal TSH:  -TSH level noted 0.26,   -repeat TSH 0.37 and Free T4 1.82    ================= Skin/Catheters============================  No rashes. Peripheral IV lines.   RIJ 4/13  RRA 3/29, Removed    - =================Prophylaxis =============================  Lovenox for DVT proph : on hold for now for anemia, may resume when CBC improving  Protonix for  GI proph    ==================GOC==================================   FULL CODE

## 2021-04-19 NOTE — PROGRESS NOTE ADULT - SUBJECTIVE AND OBJECTIVE BOX
Patient is a 55y old  Male who presents with a chief complaint of SOB (19 Apr 2021 09:32)    INTERVAL HISTORY/ OVERNIGHT EVENTS: Patient was examined while he was lying in bed, Aox3 (time, place, person), responding to questions/commands, in NAD/ventilated/O2 by NC. He denies headache, weakness, cough, chest pain, wheezing, abdominal pain. Bowel movement's, Rivera catheter placed. He is tolerating ___ diet with no nausea or vomiting.      PRESSORS: [ ] YES [ ] NO  WHICH:    ANTIBIOTICS:                  DATE STARTED:  ANTIBIOTICS:                  DATE STARTED:  ANTIBIOTICS:                  DATE STARTED:    Antimicrobial:  cefepime   IVPB 1000 milliGRAM(s) IV Intermittent every 12 hours  cefepime   IVPB        Cardiovascular:  furosemide   Injectable 40 milliGRAM(s) IV Push two times a day  hydrochlorothiazide 50 milliGRAM(s) Enteral Tube daily  midodrine. 10 milliGRAM(s) Oral three times a day  phenylephrine    Infusion 1.5 MICROgram(s)/kG/Min IV Continuous <Continuous>    Pulmonary:  ALBUTerol    90 MICROgram(s) HFA Inhaler 2 Puff(s) Inhalation every 6 hours    Hematalogic:  aspirin  chewable 81 milliGRAM(s) Oral daily    Other:  acetaminophen    Suspension .. 650 milliGRAM(s) Oral <User Schedule>  artificial  tears Solution 1 Drop(s) Both EYES every 4 hours PRN  ascorbic acid 1000 milliGRAM(s) Oral daily  chlorhexidine 0.12% Liquid 15 milliLiter(s) Oral Mucosa two times a day  chlorhexidine 2% Cloths 1 Application(s) Topical <User Schedule>  fentaNYL   Infusion 3.994 MICROgram(s)/kG/Hr IV Continuous <Continuous>  insulin lispro (ADMELOG) corrective regimen sliding scale   SubCutaneous every 6 hours  LORazepam     Tablet 1 milliGRAM(s) Oral every 4 hours PRN  midazolam Infusion 0.02 mG/kG/Hr IV Continuous <Continuous>  pantoprazole  Injectable 40 milliGRAM(s) IV Push daily  polyethylene glycol 3350 17 Gram(s) Oral daily  senna 2 Tablet(s) Oral at bedtime  sodium chloride 0.9% lock flush 10 milliLiter(s) IV Push every 1 hour PRN    acetaminophen    Suspension .. 650 milliGRAM(s) Oral <User Schedule>  ALBUTerol    90 MICROgram(s) HFA Inhaler 2 Puff(s) Inhalation every 6 hours  artificial  tears Solution 1 Drop(s) Both EYES every 4 hours PRN  ascorbic acid 1000 milliGRAM(s) Oral daily  aspirin  chewable 81 milliGRAM(s) Oral daily  cefepime   IVPB 1000 milliGRAM(s) IV Intermittent every 12 hours  cefepime   IVPB      chlorhexidine 0.12% Liquid 15 milliLiter(s) Oral Mucosa two times a day  chlorhexidine 2% Cloths 1 Application(s) Topical <User Schedule>  fentaNYL   Infusion 3.994 MICROgram(s)/kG/Hr IV Continuous <Continuous>  furosemide   Injectable 40 milliGRAM(s) IV Push two times a day  hydrochlorothiazide 50 milliGRAM(s) Enteral Tube daily  insulin lispro (ADMELOG) corrective regimen sliding scale   SubCutaneous every 6 hours  LORazepam     Tablet 1 milliGRAM(s) Oral every 4 hours PRN  midazolam Infusion 0.02 mG/kG/Hr IV Continuous <Continuous>  midodrine. 10 milliGRAM(s) Oral three times a day  pantoprazole  Injectable 40 milliGRAM(s) IV Push daily  phenylephrine    Infusion 1.5 MICROgram(s)/kG/Min IV Continuous <Continuous>  polyethylene glycol 3350 17 Gram(s) Oral daily  senna 2 Tablet(s) Oral at bedtime  sodium chloride 0.9% lock flush 10 milliLiter(s) IV Push every 1 hour PRN    Drug Dosing Weight  Height (cm): 167.6 (14 Mar 2021 12:03)  Weight (kg): 83.869 (14 Mar 2021 12:03)  BMI (kg/m2): 29.9 (14 Mar 2021 12:03)  BSA (m2): 1.93 (14 Mar 2021 12:03)    CENTRAL LINE: [ ] YES [ ] NO  LOCATION:     RIVERA: [ ] YES [ ] NO      A-LINE:  [ ] YES [ ] NO  LOCATION:         ICU Vital Signs Last 24 Hrs  T(C): 37.6 (19 Apr 2021 11:00), Max: 37.6 (19 Apr 2021 11:00)  T(F): 99.7 (19 Apr 2021 11:00), Max: 99.7 (19 Apr 2021 11:00)  HR: 98 (19 Apr 2021 11:00) (63 - 102)  BP: 118/52 (19 Apr 2021 11:00) (94/49 - 138/58)  BP(mean): 67 (19 Apr 2021 11:00) (58 - 83)  ABP: --  ABP(mean): --  RR: 22 (19 Apr 2021 11:00) (18 - 32)  SpO2: 95% (19 Apr 2021 11:00) (92% - 100%)      ABG - ( 19 Apr 2021 04:45 )  pH, Arterial: 7.45  pH, Blood: x     /  pCO2: 79    /  pO2: 87    / HCO3: 55    / Base Excess: 25.5  /  SaO2: 98                    04-18 @ 07:01  -  04-19 @ 07:00  --------------------------------------------------------  IN: 3019.2 mL / OUT: 2140 mL / NET: 879.2 mL        Mode: AC/ CMV (Assist Control/ Continuous Mandatory Ventilation)  RR (machine): 30  TV (machine): 420  FiO2: 80  PEEP: 8  ITime: 1  MAP: 16  PC: 22  PIP: 30      PHYSICAL EXAM:    GENERAL: NAD, well-groomed, well-developed  EYES: EOMI, PERRLA,   NECK: Supple, No JVD; Normal thyroid; Trachea midline  NERVOUS SYSTEM:  Alert & Oriented X3,  Motor Strength 5/5 B/L upper and lower extremities; DTRs 2+ intact and symmetric  CHEST/LUNG: No rales, rhonchi, wheezing   HEART: Regular rate and rhythm; No murmurs,   ABDOMEN: Soft, Nontender, Nondistended; Bowel sounds present  EXTREMITIES:  2+ Peripheral Pulses, No clubbing, cyanosis, or edema      LABS:                        8.8    10.23 )-----------( 485      ( 19 Apr 2021 04:14 )             29.8     04-19    143  |  96  |  25<H>  ----------------------------<  127<H>  3.4<L>   |  48<HH>  |  0.46<L>    Ca    8.6      19 Apr 2021 04:14  Phos  2.0     04-19  Mg     2.3     04-19    TPro  6.3  /  Alb  2.7<L>  /  TBili  0.5  /  DBili  x   /  AST  30  /  ALT  31  /  AlkPhos  98  04-19    PT/INR - ( 19 Apr 2021 04:14 )   PT: 14.2 sec;   INR: 1.20 ratio             CAPILLARY BLOOD GLUCOSE            RADIOLOGY & ADDITIONAL STUDIES REVIEWED:  ***    [ ]GOALS OF CARE DISCUSSION WITH PATIENT/FAMILY/PROXY:    CRITICAL CARE TIME SPENT: 35 minutes Patient is a 55y old  Male who presents with a chief complaint of SOB (19 Apr 2021 09:32)    INTERVAL HISTORY/ OVERNIGHT EVENTS: Patient was examined while he was lying in bed, sedated and ventilated. He is still on 80% Fio2 and will need further reduction prior to Trach plan. His midodrine was increased to 10, and will be tapered off the pheny. Also started on lasix 40mg Twice daily for fluid overload.       PRESSORS: [x ] YES [ ] NO  WHICH:  Pheny     ANTIBIOTICS:   Cefepime               DATE STARTED: 4/18  ANTIBIOTICS:                  DATE STARTED:  ANTIBIOTICS:                  DATE STARTED:    Antimicrobial:  cefepime   IVPB 1000 milliGRAM(s) IV Intermittent every 12 hours  cefepime   IVPB        Cardiovascular:  furosemide   Injectable 40 milliGRAM(s) IV Push two times a day  hydrochlorothiazide 50 milliGRAM(s) Enteral Tube daily  midodrine. 10 milliGRAM(s) Oral three times a day  phenylephrine    Infusion 1.5 MICROgram(s)/kG/Min IV Continuous <Continuous>    Pulmonary:  ALBUTerol    90 MICROgram(s) HFA Inhaler 2 Puff(s) Inhalation every 6 hours    Hematalogic:  aspirin  chewable 81 milliGRAM(s) Oral daily    Other:  acetaminophen    Suspension .. 650 milliGRAM(s) Oral <User Schedule>  artificial  tears Solution 1 Drop(s) Both EYES every 4 hours PRN  ascorbic acid 1000 milliGRAM(s) Oral daily  chlorhexidine 0.12% Liquid 15 milliLiter(s) Oral Mucosa two times a day  chlorhexidine 2% Cloths 1 Application(s) Topical <User Schedule>  fentaNYL   Infusion 3.994 MICROgram(s)/kG/Hr IV Continuous <Continuous>  insulin lispro (ADMELOG) corrective regimen sliding scale   SubCutaneous every 6 hours  LORazepam     Tablet 1 milliGRAM(s) Oral every 4 hours PRN  midazolam Infusion 0.02 mG/kG/Hr IV Continuous <Continuous>  pantoprazole  Injectable 40 milliGRAM(s) IV Push daily  polyethylene glycol 3350 17 Gram(s) Oral daily  senna 2 Tablet(s) Oral at bedtime  sodium chloride 0.9% lock flush 10 milliLiter(s) IV Push every 1 hour PRN    acetaminophen    Suspension .. 650 milliGRAM(s) Oral <User Schedule>  ALBUTerol    90 MICROgram(s) HFA Inhaler 2 Puff(s) Inhalation every 6 hours  artificial  tears Solution 1 Drop(s) Both EYES every 4 hours PRN  ascorbic acid 1000 milliGRAM(s) Oral daily  aspirin  chewable 81 milliGRAM(s) Oral daily  cefepime   IVPB 1000 milliGRAM(s) IV Intermittent every 12 hours  cefepime   IVPB      chlorhexidine 0.12% Liquid 15 milliLiter(s) Oral Mucosa two times a day  chlorhexidine 2% Cloths 1 Application(s) Topical <User Schedule>  fentaNYL   Infusion 3.994 MICROgram(s)/kG/Hr IV Continuous <Continuous>  furosemide   Injectable 40 milliGRAM(s) IV Push two times a day  hydrochlorothiazide 50 milliGRAM(s) Enteral Tube daily  insulin lispro (ADMELOG) corrective regimen sliding scale   SubCutaneous every 6 hours  LORazepam     Tablet 1 milliGRAM(s) Oral every 4 hours PRN  midazolam Infusion 0.02 mG/kG/Hr IV Continuous <Continuous>  midodrine. 10 milliGRAM(s) Oral three times a day  pantoprazole  Injectable 40 milliGRAM(s) IV Push daily  phenylephrine    Infusion 1.5 MICROgram(s)/kG/Min IV Continuous <Continuous>  polyethylene glycol 3350 17 Gram(s) Oral daily  senna 2 Tablet(s) Oral at bedtime  sodium chloride 0.9% lock flush 10 milliLiter(s) IV Push every 1 hour PRN    Drug Dosing Weight  Height (cm): 167.6 (14 Mar 2021 12:03)  Weight (kg): 83.869 (14 Mar 2021 12:03)  BMI (kg/m2): 29.9 (14 Mar 2021 12:03)  BSA (m2): 1.93 (14 Mar 2021 12:03)    CENTRAL LINE: [ ] YES [ ] NO  LOCATION:     RIVERA: [ ] YES [ ] NO      A-LINE:  [ ] YES [ ] NO  LOCATION:         ICU Vital Signs Last 24 Hrs  T(C): 37.6 (19 Apr 2021 11:00), Max: 37.6 (19 Apr 2021 11:00)  T(F): 99.7 (19 Apr 2021 11:00), Max: 99.7 (19 Apr 2021 11:00)  HR: 98 (19 Apr 2021 11:00) (63 - 102)  BP: 118/52 (19 Apr 2021 11:00) (94/49 - 138/58)  BP(mean): 67 (19 Apr 2021 11:00) (58 - 83)  ABP: --  ABP(mean): --  RR: 22 (19 Apr 2021 11:00) (18 - 32)  SpO2: 95% (19 Apr 2021 11:00) (92% - 100%)      ABG - ( 19 Apr 2021 04:45 )  pH, Arterial: 7.45  pH, Blood: x     /  pCO2: 79    /  pO2: 87    / HCO3: 55    / Base Excess: 25.5  /  SaO2: 98                    04-18 @ 07:01  -  04-19 @ 07:00  --------------------------------------------------------  IN: 3019.2 mL / OUT: 2140 mL / NET: 879.2 mL        Mode: AC/ CMV (Assist Control/ Continuous Mandatory Ventilation)  RR (machine): 30  TV (machine): 420  FiO2: 80  PEEP: 8  ITime: 1  MAP: 16  PC: 22  PIP: 30      PHYSICAL EXAM:    GENERAL: NAD, well-groomed, well-developed  EYES: EOMI, PERRLA,   NECK: Supple, No JVD; Normal thyroid; Trachea midline  NERVOUS SYSTEM:  Alert & Oriented X3,  Motor Strength 5/5 B/L upper and lower extremities; DTRs 2+ intact and symmetric  CHEST/LUNG: No rales, rhonchi, wheezing   HEART: Regular rate and rhythm; No murmurs,   ABDOMEN: Soft, Nontender, Nondistended; Bowel sounds present  EXTREMITIES:  2+ Peripheral Pulses, No clubbing, cyanosis, or edema      LABS:                        8.8    10.23 )-----------( 485      ( 19 Apr 2021 04:14 )             29.8     04-19    143  |  96  |  25<H>  ----------------------------<  127<H>  3.4<L>   |  48<HH>  |  0.46<L>    Ca    8.6      19 Apr 2021 04:14  Phos  2.0     04-19  Mg     2.3     04-19    TPro  6.3  /  Alb  2.7<L>  /  TBili  0.5  /  DBili  x   /  AST  30  /  ALT  31  /  AlkPhos  98  04-19    PT/INR - ( 19 Apr 2021 04:14 )   PT: 14.2 sec;   INR: 1.20 ratio             CAPILLARY BLOOD GLUCOSE            RADIOLOGY & ADDITIONAL STUDIES REVIEWED:  ***    [ ]GOALS OF CARE DISCUSSION WITH PATIENT/FAMILY/PROXY:    CRITICAL CARE TIME SPENT: 35 minutes Patient is a 55y old  Male who presents with a chief complaint of SOB (19 Apr 2021 09:32)    INTERVAL HISTORY/ OVERNIGHT EVENTS: Patient was examined while he was lying in bed, sedated and ventilated. He is still on 80% Fio2 and will need further reduction prior to Trach plan. His midodrine was increased to 10, and will be tapered off the pheny. Also started on lasix 40mg Twice daily for fluid overload. ID recc'ed that CRE in sputum does not need to be treated; cefepime discontinued.       PRESSORS: [x ] YES [ ] NO  WHICH:  Pheny     ANTIBIOTICS:   Cefepime               DATE STARTED: 4/18  ANTIBIOTICS:                  DATE STARTED:  ANTIBIOTICS:                  DATE STARTED:    Antimicrobial:  cefepime   IVPB 1000 milliGRAM(s) IV Intermittent every 12 hours  cefepime   IVPB        Cardiovascular:  furosemide   Injectable 40 milliGRAM(s) IV Push two times a day  hydrochlorothiazide 50 milliGRAM(s) Enteral Tube daily  midodrine. 10 milliGRAM(s) Oral three times a day  phenylephrine    Infusion 1.5 MICROgram(s)/kG/Min IV Continuous <Continuous>    Pulmonary:  ALBUTerol    90 MICROgram(s) HFA Inhaler 2 Puff(s) Inhalation every 6 hours    Hematalogic:  aspirin  chewable 81 milliGRAM(s) Oral daily    Other:  acetaminophen    Suspension .. 650 milliGRAM(s) Oral <User Schedule>  artificial  tears Solution 1 Drop(s) Both EYES every 4 hours PRN  ascorbic acid 1000 milliGRAM(s) Oral daily  chlorhexidine 0.12% Liquid 15 milliLiter(s) Oral Mucosa two times a day  chlorhexidine 2% Cloths 1 Application(s) Topical <User Schedule>  fentaNYL   Infusion 3.994 MICROgram(s)/kG/Hr IV Continuous <Continuous>  insulin lispro (ADMELOG) corrective regimen sliding scale   SubCutaneous every 6 hours  LORazepam     Tablet 1 milliGRAM(s) Oral every 4 hours PRN  midazolam Infusion 0.02 mG/kG/Hr IV Continuous <Continuous>  pantoprazole  Injectable 40 milliGRAM(s) IV Push daily  polyethylene glycol 3350 17 Gram(s) Oral daily  senna 2 Tablet(s) Oral at bedtime  sodium chloride 0.9% lock flush 10 milliLiter(s) IV Push every 1 hour PRN    acetaminophen    Suspension .. 650 milliGRAM(s) Oral <User Schedule>  ALBUTerol    90 MICROgram(s) HFA Inhaler 2 Puff(s) Inhalation every 6 hours  artificial  tears Solution 1 Drop(s) Both EYES every 4 hours PRN  ascorbic acid 1000 milliGRAM(s) Oral daily  aspirin  chewable 81 milliGRAM(s) Oral daily  cefepime   IVPB 1000 milliGRAM(s) IV Intermittent every 12 hours  cefepime   IVPB      chlorhexidine 0.12% Liquid 15 milliLiter(s) Oral Mucosa two times a day  chlorhexidine 2% Cloths 1 Application(s) Topical <User Schedule>  fentaNYL   Infusion 3.994 MICROgram(s)/kG/Hr IV Continuous <Continuous>  furosemide   Injectable 40 milliGRAM(s) IV Push two times a day  hydrochlorothiazide 50 milliGRAM(s) Enteral Tube daily  insulin lispro (ADMELOG) corrective regimen sliding scale   SubCutaneous every 6 hours  LORazepam     Tablet 1 milliGRAM(s) Oral every 4 hours PRN  midazolam Infusion 0.02 mG/kG/Hr IV Continuous <Continuous>  midodrine. 10 milliGRAM(s) Oral three times a day  pantoprazole  Injectable 40 milliGRAM(s) IV Push daily  phenylephrine    Infusion 1.5 MICROgram(s)/kG/Min IV Continuous <Continuous>  polyethylene glycol 3350 17 Gram(s) Oral daily  senna 2 Tablet(s) Oral at bedtime  sodium chloride 0.9% lock flush 10 milliLiter(s) IV Push every 1 hour PRN    Drug Dosing Weight  Height (cm): 167.6 (14 Mar 2021 12:03)  Weight (kg): 83.869 (14 Mar 2021 12:03)  BMI (kg/m2): 29.9 (14 Mar 2021 12:03)  BSA (m2): 1.93 (14 Mar 2021 12:03)    CENTRAL LINE: [ ] YES [ ] NO  LOCATION:     RIVERA: [ ] YES [ ] NO      A-LINE:  [ ] YES [ ] NO  LOCATION:         ICU Vital Signs Last 24 Hrs  T(C): 37.6 (19 Apr 2021 11:00), Max: 37.6 (19 Apr 2021 11:00)  T(F): 99.7 (19 Apr 2021 11:00), Max: 99.7 (19 Apr 2021 11:00)  HR: 98 (19 Apr 2021 11:00) (63 - 102)  BP: 118/52 (19 Apr 2021 11:00) (94/49 - 138/58)  BP(mean): 67 (19 Apr 2021 11:00) (58 - 83)  ABP: --  ABP(mean): --  RR: 22 (19 Apr 2021 11:00) (18 - 32)  SpO2: 95% (19 Apr 2021 11:00) (92% - 100%)      ABG - ( 19 Apr 2021 04:45 )  pH, Arterial: 7.45  pH, Blood: x     /  pCO2: 79    /  pO2: 87    / HCO3: 55    / Base Excess: 25.5  /  SaO2: 98                    04-18 @ 07:01  -  04-19 @ 07:00  --------------------------------------------------------  IN: 3019.2 mL / OUT: 2140 mL / NET: 879.2 mL        Mode: AC/ CMV (Assist Control/ Continuous Mandatory Ventilation)  RR (machine): 30  TV (machine): 420  FiO2: 80  PEEP: 8  ITime: 1  MAP: 16  PC: 22  PIP: 30      PHYSICAL EXAM:    GENERAL: NAD, well-groomed, well-developed  EYES: EOMI, PERRLA,   NECK: Supple, No JVD; Normal thyroid; Trachea midline  NERVOUS SYSTEM:  Alert & Oriented X3,  Motor Strength 5/5 B/L upper and lower extremities; DTRs 2+ intact and symmetric  CHEST/LUNG: No rales, rhonchi, wheezing   HEART: Regular rate and rhythm; No murmurs,   ABDOMEN: Soft, Nontender, Nondistended; Bowel sounds present  EXTREMITIES:  2+ Peripheral Pulses, No clubbing, cyanosis, or edema      LABS:                        8.8    10.23 )-----------( 485      ( 19 Apr 2021 04:14 )             29.8     04-19    143  |  96  |  25<H>  ----------------------------<  127<H>  3.4<L>   |  48<HH>  |  0.46<L>    Ca    8.6      19 Apr 2021 04:14  Phos  2.0     04-19  Mg     2.3     04-19    TPro  6.3  /  Alb  2.7<L>  /  TBili  0.5  /  DBili  x   /  AST  30  /  ALT  31  /  AlkPhos  98  04-19    PT/INR - ( 19 Apr 2021 04:14 )   PT: 14.2 sec;   INR: 1.20 ratio             CAPILLARY BLOOD GLUCOSE            RADIOLOGY & ADDITIONAL STUDIES REVIEWED:  ***    [ ]GOALS OF CARE DISCUSSION WITH PATIENT/FAMILY/PROXY:    CRITICAL CARE TIME SPENT: 35 minutes

## 2021-04-19 NOTE — PROGRESS NOTE ADULT - ASSESSMENT
Assessment:  1. Acute hypoxic respiratory failure, pat intubated 3/29   2 Covid-19 infection   3. Transaminitis  4. Prediabetes  5. Bilateral pneumothorax  6. Septic shock     Plan   -pt on antibiotics, Mechanical ventilation with lung protective strategy,   -Vasopressor support  -PTX  improved post chest tube placement  -Preop for Trach placement, npo after midnight, type and screen consent by Thoracic attending,

## 2021-04-20 ENCOUNTER — TRANSCRIPTION ENCOUNTER (OUTPATIENT)
Age: 56
End: 2021-04-20

## 2021-04-20 LAB
ALBUMIN SERPL ELPH-MCNC: 2.5 G/DL — LOW (ref 3.5–5)
ALP SERPL-CCNC: 110 U/L — SIGNIFICANT CHANGE UP (ref 40–120)
ALT FLD-CCNC: 28 U/L DA — SIGNIFICANT CHANGE UP (ref 10–60)
ANION GAP SERPL CALC-SCNC: -9 MMOL/L — LOW (ref 5–17)
AST SERPL-CCNC: 29 U/L — SIGNIFICANT CHANGE UP (ref 10–40)
BASE EXCESS BLDA CALC-SCNC: 19.8 MMOL/L — HIGH (ref -2–3)
BASE EXCESS BLDA CALC-SCNC: 21.7 MMOL/L — HIGH (ref -2–3)
BASE EXCESS BLDA CALC-SCNC: 21.9 MMOL/L — HIGH (ref -2–3)
BASE EXCESS BLDA CALC-SCNC: 25.7 MMOL/L — HIGH (ref -2–3)
BASOPHILS # BLD AUTO: 0.06 K/UL — SIGNIFICANT CHANGE UP (ref 0–0.2)
BASOPHILS NFR BLD AUTO: 0.6 % — SIGNIFICANT CHANGE UP (ref 0–2)
BILIRUB SERPL-MCNC: 0.3 MG/DL — SIGNIFICANT CHANGE UP (ref 0.2–1.2)
BLOOD GAS COMMENTS ARTERIAL: SIGNIFICANT CHANGE UP
BUN SERPL-MCNC: 28 MG/DL — HIGH (ref 7–18)
CALCIUM SERPL-MCNC: 8.5 MG/DL — SIGNIFICANT CHANGE UP (ref 8.4–10.5)
CHLORIDE SERPL-SCNC: 102 MMOL/L — SIGNIFICANT CHANGE UP (ref 96–108)
CO2 SERPL-SCNC: 52 MMOL/L — CRITICAL HIGH (ref 22–31)
CREAT SERPL-MCNC: 0.46 MG/DL — LOW (ref 0.5–1.3)
D DIMER BLD IA.RAPID-MCNC: 2911 NG/ML DDU — HIGH
EOSINOPHIL # BLD AUTO: 0.05 K/UL — SIGNIFICANT CHANGE UP (ref 0–0.5)
EOSINOPHIL NFR BLD AUTO: 0.5 % — SIGNIFICANT CHANGE UP (ref 0–6)
GLUCOSE BLDC GLUCOMTR-MCNC: 119 MG/DL — HIGH (ref 70–99)
GLUCOSE BLDC GLUCOMTR-MCNC: 127 MG/DL — HIGH (ref 70–99)
GLUCOSE BLDC GLUCOMTR-MCNC: 134 MG/DL — HIGH (ref 70–99)
GLUCOSE BLDC GLUCOMTR-MCNC: 135 MG/DL — HIGH (ref 70–99)
GLUCOSE SERPL-MCNC: 110 MG/DL — HIGH (ref 70–99)
HCO3 BLDA-SCNC: 53 MMOL/L — CRITICAL HIGH (ref 21–28)
HCO3 BLDA-SCNC: 55 MMOL/L — CRITICAL HIGH (ref 21–28)
HCO3 BLDA-SCNC: 57 MMOL/L — CRITICAL HIGH (ref 21–28)
HCO3 BLDA-SCNC: 59 MMOL/L — CRITICAL HIGH (ref 21–28)
HCT VFR BLD CALC: 30.3 % — LOW (ref 39–50)
HGB BLD-MCNC: 8.8 G/DL — LOW (ref 13–17)
HOROWITZ INDEX BLDA+IHG-RTO: 70 — SIGNIFICANT CHANGE UP
HOROWITZ INDEX BLDA+IHG-RTO: 75 — SIGNIFICANT CHANGE UP
HOROWITZ INDEX BLDA+IHG-RTO: 80 — SIGNIFICANT CHANGE UP
IMM GRANULOCYTES NFR BLD AUTO: 10 % — HIGH (ref 0–1.5)
LYMPHOCYTES # BLD AUTO: 1.18 K/UL — SIGNIFICANT CHANGE UP (ref 1–3.3)
LYMPHOCYTES # BLD AUTO: 12.3 % — LOW (ref 13–44)
MAGNESIUM SERPL-MCNC: 2.4 MG/DL — SIGNIFICANT CHANGE UP (ref 1.6–2.6)
MCHC RBC-ENTMCNC: 29 GM/DL — LOW (ref 32–36)
MCHC RBC-ENTMCNC: 32.4 PG — SIGNIFICANT CHANGE UP (ref 27–34)
MCV RBC AUTO: 111.4 FL — HIGH (ref 80–100)
MONOCYTES # BLD AUTO: 0.44 K/UL — SIGNIFICANT CHANGE UP (ref 0–0.9)
MONOCYTES NFR BLD AUTO: 4.6 % — SIGNIFICANT CHANGE UP (ref 2–14)
NEUTROPHILS # BLD AUTO: 6.92 K/UL — SIGNIFICANT CHANGE UP (ref 1.8–7.4)
NEUTROPHILS NFR BLD AUTO: 72 % — SIGNIFICANT CHANGE UP (ref 43–77)
NRBC # BLD: 0 /100 WBCS — SIGNIFICANT CHANGE UP (ref 0–0)
PCO2 BLDA: 114 MMHG — CRITICAL HIGH (ref 35–48)
PCO2 BLDA: 114 MMHG — CRITICAL HIGH (ref 35–48)
PCO2 BLDA: 115 MMHG — CRITICAL HIGH (ref 35–48)
PCO2 BLDA: 150 MMHG — CRITICAL HIGH (ref 35–48)
PH BLDA: 7.19 — CRITICAL LOW (ref 7.35–7.45)
PH BLDA: 7.27 — LOW (ref 7.35–7.45)
PH BLDA: 7.29 — LOW (ref 7.35–7.45)
PH BLDA: 7.32 — LOW (ref 7.35–7.45)
PHOSPHATE SERPL-MCNC: 4.5 MG/DL — SIGNIFICANT CHANGE UP (ref 2.5–4.5)
PLATELET # BLD AUTO: 375 K/UL — SIGNIFICANT CHANGE UP (ref 150–400)
PO2 BLDA: 114 MMHG — HIGH (ref 83–108)
PO2 BLDA: 130 MMHG — HIGH (ref 83–108)
PO2 BLDA: 134 MMHG — HIGH (ref 83–108)
PO2 BLDA: 65 MMHG — LOW (ref 83–108)
POTASSIUM SERPL-MCNC: 5 MMOL/L — SIGNIFICANT CHANGE UP (ref 3.5–5.3)
POTASSIUM SERPL-SCNC: 5 MMOL/L — SIGNIFICANT CHANGE UP (ref 3.5–5.3)
PROT SERPL-MCNC: 6.3 G/DL — SIGNIFICANT CHANGE UP (ref 6–8.3)
RBC # BLD: 2.72 M/UL — LOW (ref 4.2–5.8)
RBC # FLD: 14.7 % — HIGH (ref 10.3–14.5)
SAO2 % BLDA: 100 % — SIGNIFICANT CHANGE UP
SAO2 % BLDA: 100 % — SIGNIFICANT CHANGE UP
SAO2 % BLDA: 96 % — SIGNIFICANT CHANGE UP
SAO2 % BLDA: SIGNIFICANT CHANGE UP %
SARS-COV-2 RNA SPEC QL NAA+PROBE: SIGNIFICANT CHANGE UP
SODIUM SERPL-SCNC: 145 MMOL/L — SIGNIFICANT CHANGE UP (ref 135–145)
TRIGL SERPL-MCNC: 340 MG/DL — HIGH
WBC # BLD: 9.61 K/UL — SIGNIFICANT CHANGE UP (ref 3.8–10.5)
WBC # FLD AUTO: 9.61 K/UL — SIGNIFICANT CHANGE UP (ref 3.8–10.5)

## 2021-04-20 PROCEDURE — 71045 X-RAY EXAM CHEST 1 VIEW: CPT | Mod: 26

## 2021-04-20 RX ORDER — ALBUTEROL 90 UG/1
2 AEROSOL, METERED ORAL EVERY 6 HOURS
Refills: 0 | Status: DISCONTINUED | OUTPATIENT
Start: 2021-04-20 | End: 2021-04-23

## 2021-04-20 RX ORDER — METOPROLOL TARTRATE 50 MG
5 TABLET ORAL ONCE
Refills: 0 | Status: COMPLETED | OUTPATIENT
Start: 2021-04-20 | End: 2021-04-20

## 2021-04-20 RX ORDER — ACETAMINOPHEN 500 MG
650 TABLET ORAL EVERY 6 HOURS
Refills: 0 | Status: DISCONTINUED | OUTPATIENT
Start: 2021-04-20 | End: 2021-04-23

## 2021-04-20 RX ORDER — FENTANYL CITRATE 50 UG/ML
75 INJECTION INTRAVENOUS ONCE
Refills: 0 | Status: DISCONTINUED | OUTPATIENT
Start: 2021-04-20 | End: 2021-04-20

## 2021-04-20 RX ORDER — DEXMEDETOMIDINE HYDROCHLORIDE IN 0.9% SODIUM CHLORIDE 4 UG/ML
0.1 INJECTION INTRAVENOUS
Qty: 400 | Refills: 0 | Status: DISCONTINUED | OUTPATIENT
Start: 2021-04-20 | End: 2021-04-20

## 2021-04-20 RX ORDER — ACETAMINOPHEN 500 MG
1000 TABLET ORAL ONCE
Refills: 0 | Status: COMPLETED | OUTPATIENT
Start: 2021-04-20 | End: 2021-04-20

## 2021-04-20 RX ORDER — CISATRACURIUM BESYLATE 2 MG/ML
10 INJECTION INTRAVENOUS ONCE
Refills: 0 | Status: COMPLETED | OUTPATIENT
Start: 2021-04-20 | End: 2021-04-20

## 2021-04-20 RX ORDER — CHLORHEXIDINE GLUCONATE 213 G/1000ML
1 SOLUTION TOPICAL DAILY
Refills: 0 | Status: COMPLETED | OUTPATIENT
Start: 2021-04-20 | End: 2021-04-21

## 2021-04-20 RX ADMIN — MIDODRINE HYDROCHLORIDE 10 MILLIGRAM(S): 2.5 TABLET ORAL at 05:39

## 2021-04-20 RX ADMIN — CHLORHEXIDINE GLUCONATE 15 MILLILITER(S): 213 SOLUTION TOPICAL at 18:29

## 2021-04-20 RX ADMIN — PANTOPRAZOLE SODIUM 40 MILLIGRAM(S): 20 TABLET, DELAYED RELEASE ORAL at 12:27

## 2021-04-20 RX ADMIN — MIDODRINE HYDROCHLORIDE 10 MILLIGRAM(S): 2.5 TABLET ORAL at 18:25

## 2021-04-20 RX ADMIN — CHLORHEXIDINE GLUCONATE 1 APPLICATION(S): 213 SOLUTION TOPICAL at 05:39

## 2021-04-20 RX ADMIN — MIDAZOLAM HYDROCHLORIDE 1.68 MG/KG/HR: 1 INJECTION, SOLUTION INTRAMUSCULAR; INTRAVENOUS at 09:01

## 2021-04-20 RX ADMIN — FENTANYL CITRATE 75 MICROGRAM(S): 50 INJECTION INTRAVENOUS at 11:45

## 2021-04-20 RX ADMIN — Medication 650 MILLIGRAM(S): at 02:10

## 2021-04-20 RX ADMIN — MIDAZOLAM HYDROCHLORIDE 1.68 MG/KG/HR: 1 INJECTION, SOLUTION INTRAMUSCULAR; INTRAVENOUS at 16:21

## 2021-04-20 RX ADMIN — Medication 1 DROP(S): at 18:26

## 2021-04-20 RX ADMIN — FENTANYL CITRATE 75 MICROGRAM(S): 50 INJECTION INTRAVENOUS at 10:24

## 2021-04-20 RX ADMIN — Medication 5 MILLIGRAM(S): at 16:21

## 2021-04-20 RX ADMIN — Medication 1000 MILLIGRAM(S): at 14:27

## 2021-04-20 RX ADMIN — MIDAZOLAM HYDROCHLORIDE 1.68 MG/KG/HR: 1 INJECTION, SOLUTION INTRAMUSCULAR; INTRAVENOUS at 06:37

## 2021-04-20 RX ADMIN — Medication 650 MILLIGRAM(S): at 00:39

## 2021-04-20 RX ADMIN — CISATRACURIUM BESYLATE 10 MILLIGRAM(S): 2 INJECTION INTRAVENOUS at 09:45

## 2021-04-20 RX ADMIN — Medication 400 MILLIGRAM(S): at 13:10

## 2021-04-20 RX ADMIN — ALBUTEROL 2 PUFF(S): 90 AEROSOL, METERED ORAL at 09:36

## 2021-04-20 RX ADMIN — PHENYLEPHRINE HYDROCHLORIDE 23.6 MICROGRAM(S)/KG/MIN: 10 INJECTION INTRAVENOUS at 06:35

## 2021-04-20 RX ADMIN — FENTANYL CITRATE 33.5 MICROGRAM(S)/KG/HR: 50 INJECTION INTRAVENOUS at 17:57

## 2021-04-20 RX ADMIN — Medication 5 MILLIGRAM(S): at 22:30

## 2021-04-20 RX ADMIN — CHLORHEXIDINE GLUCONATE 1 APPLICATION(S): 213 SOLUTION TOPICAL at 18:26

## 2021-04-20 RX ADMIN — ALBUTEROL 2 PUFF(S): 90 AEROSOL, METERED ORAL at 15:24

## 2021-04-20 RX ADMIN — SENNA PLUS 2 TABLET(S): 8.6 TABLET ORAL at 21:04

## 2021-04-20 RX ADMIN — CHLORHEXIDINE GLUCONATE 15 MILLILITER(S): 213 SOLUTION TOPICAL at 05:39

## 2021-04-20 RX ADMIN — FENTANYL CITRATE 33.5 MICROGRAM(S)/KG/HR: 50 INJECTION INTRAVENOUS at 06:36

## 2021-04-20 RX ADMIN — MIDAZOLAM HYDROCHLORIDE 1.68 MG/KG/HR: 1 INJECTION, SOLUTION INTRAMUSCULAR; INTRAVENOUS at 23:55

## 2021-04-20 RX ADMIN — FENTANYL CITRATE 33.5 MICROGRAM(S)/KG/HR: 50 INJECTION INTRAVENOUS at 10:25

## 2021-04-20 RX ADMIN — ALBUTEROL 2 PUFF(S): 90 AEROSOL, METERED ORAL at 04:05

## 2021-04-20 NOTE — PROGRESS NOTE ADULT - SUBJECTIVE AND OBJECTIVE BOX
Patient is a 55y old  Male who presents with a chief complaint of SOB (19 Apr 2021 17:09)    pt seen in icu [ x ], reg med floor [   ], bed [ x ], chair at bedside [   ], a+o x3 [  ], sedated [x  ],  nad [x  ]    andrea [ x ], ngt feed [x  ], et tube [ x ], cent line [x  ],        Allergies    No Known Allergies        Vitals    T(F): 98.4 (04-20-21 @ 03:00), Max: 99.7 (04-19-21 @ 11:00)  HR: 78 (04-20-21 @ 06:30) (73 - 142)  BP: 101/49 (04-20-21 @ 06:30) (72/40 - 141/62)  RR: 25 (04-20-21 @ 06:30) (7 - 32)  SpO2: 97% (04-20-21 @ 06:30) (89% - 100%)  Wt(kg): --  CAPILLARY BLOOD GLUCOSE      POCT Blood Glucose.: 119 mg/dL (20 Apr 2021 05:48)      Labs                          8.8    9.61  )-----------( 375      ( 20 Apr 2021 03:51 )             30.3       04-20    145  |  102  |  28<H>  ----------------------------<  110<H>  5.0   |  52<HH>  |  0.46<L>    Ca    8.5      20 Apr 2021 03:50  Phos  4.5     04-20  Mg     2.4     04-20    TPro  6.3  /  Alb  2.5<L>  /  TBili  0.3  /  DBili  x   /  AST  29  /  ALT  28  /  AlkPhos  110  04-20            .Sputum Sputum  04-16 @ 04:42   Moderate Enterobacter aerogenes (Carbapenem Resistant)  Normal Respiratory Monserrat present  --  Enterobacter aerogenes (Carbapenem Resistant)      .Urine Clean Catch (Midstream)  03-15 @ 00:52   No growth  --  --          Radiology Results      Meds    MEDICATIONS  (STANDING):  acetaminophen    Suspension .. 650 milliGRAM(s) Oral <User Schedule>  ALBUTerol    90 MICROgram(s) HFA Inhaler 2 Puff(s) Inhalation every 6 hours  ascorbic acid 1000 milliGRAM(s) Oral daily  aspirin  chewable 81 milliGRAM(s) Oral daily  chlorhexidine 0.12% Liquid 15 milliLiter(s) Oral Mucosa two times a day  chlorhexidine 2% Cloths 1 Application(s) Topical <User Schedule>  enoxaparin Injectable 40 milliGRAM(s) SubCutaneous daily  fentaNYL   Infusion 3.994 MICROgram(s)/kG/Hr (33.5 mL/Hr) IV Continuous <Continuous>  insulin lispro (ADMELOG) corrective regimen sliding scale   SubCutaneous every 6 hours  midazolam Infusion 0.02 mG/kG/Hr (1.68 mL/Hr) IV Continuous <Continuous>  midodrine. 10 milliGRAM(s) Oral three times a day  pantoprazole  Injectable 40 milliGRAM(s) IV Push daily  phenylephrine    Infusion 1.5 MICROgram(s)/kG/Min (23.6 mL/Hr) IV Continuous <Continuous>  polyethylene glycol 3350 17 Gram(s) Oral daily  senna 2 Tablet(s) Oral at bedtime      MEDICATIONS  (PRN):  artificial  tears Solution 1 Drop(s) Both EYES every 4 hours PRN Dry Eyes  LORazepam     Tablet 1 milliGRAM(s) Oral every 4 hours PRN Agitation  sodium chloride 0.9% lock flush 10 milliLiter(s) IV Push every 1 hour PRN Pre/post blood products, medications, blood draw, and to maintain line patency      Physical Exam    Neuro :  no focal deficits  Respiratory: CTA B/L  CV: RRR, S1S2, no murmurs,   Abdominal: Soft, NT, ND +BS,  Extremities: No edema, + peripheral pulses      ASSESSMENT    Hypoxemia 2nd to covid pna   transaminitis  prediabetes  h/o appendectomy  cholecystectomy        PLAN    contact and airborne isolation  d/c remdesevir given covid ab positive noted   completed dexamethasone   started pulse steroids for 3 days - 250mg solumedrol bid now tapered off 4/14/21  cont asa, vit c,    cont albuterol inhaler   pulm f/u  procalcitonin, D-dimer, crp, ldh, ferritin, lactate noted ,    tmx 99.4  cont tylenol prn,   cont robitussin prn   pt on fentanyl, phenylephrine, midazolam drip  s/p intubation 3/29/21  O2 sat (86% - 100%) mech vent 80%  O2 via mech vent and taper fio2 as tolerated   vent mgmt as per icu  xray 3/19/21 with pneumomediastinum  rept cxr with New trace right apical pneumothorax. New mild left apical pneumothorax. Grossly stable small pneumomediastinum.  Soft tissue emphysema at the neck bases bilaterally. Grossly stable bilateral pulmonary infiltrates noted.   cxr 2/24 with No evidence of pneumothorax can be appreciated on the available image. This may be related to patient positioning. Evidence of pneumomediastinum and subcutaneous emphysema in the lower neck is again noted. There are patchy bibasilar infiltrates and elevated right hemidiaphragm noted.   cxr 3/29/21 with No significant change bilateral infiltrates. There is a small simple left apical pneumothorax. No significant pleural effusion. Bilateral subcutaneous emphysema similar to prior.   pt intubated 6AM 3/29/21,   XRs on 7AM and 5PM with no obvious increase of ptx  cxr 4/4/21 with Improving bilateral airspace disease noted    cxr 4/8/21 with Small left pneumothorax noted.  thoracic surg f/u   s/p L chest tube replacement 4/18/21 for recurrence of left pneumothorax   f/u cxr   CXR 4/11/21 with : No interval change compared to one day prior. No pneumothorax noted.   Daily CXR  Monitor O2 status   pt for possible trach when fio2 <50%  id f/u   Continue Meropenem 1g iv q8  f/u blood cx   speutum cx with enterobacter noted above  andrea   lispro ss   prognosis poor  cont current meds  mgmt as per icu      Patient is a 55y old  Male who presents with a chief complaint of SOB (19 Apr 2021 17:09)    pt seen in icu [ x ], reg med floor [   ], bed [ x ], chair at bedside [   ], a+o x3 [  ], sedated [x  ],  nad [x  ]    andrea [ x ], ngt feed [x  ], et tube [ x ], cent line [x  ],        Allergies    No Known Allergies        Vitals    T(F): 98.4 (04-20-21 @ 03:00), Max: 99.7 (04-19-21 @ 11:00)  HR: 78 (04-20-21 @ 06:30) (73 - 142)  BP: 101/49 (04-20-21 @ 06:30) (72/40 - 141/62)  RR: 25 (04-20-21 @ 06:30) (7 - 32)  SpO2: 97% (04-20-21 @ 06:30) (89% - 100%)  Wt(kg): --  CAPILLARY BLOOD GLUCOSE      POCT Blood Glucose.: 119 mg/dL (20 Apr 2021 05:48)      Labs                          8.8    9.61  )-----------( 375      ( 20 Apr 2021 03:51 )             30.3       04-20    145  |  102  |  28<H>  ----------------------------<  110<H>  5.0   |  52<HH>  |  0.46<L>    Ca    8.5      20 Apr 2021 03:50  Phos  4.5     04-20  Mg     2.4     04-20    TPro  6.3  /  Alb  2.5<L>  /  TBili  0.3  /  DBili  x   /  AST  29  /  ALT  28  /  AlkPhos  110  04-20            .Sputum Sputum  04-16 @ 04:42   Moderate Enterobacter aerogenes (Carbapenem Resistant)  Normal Respiratory Monserrat present  --  Enterobacter aerogenes (Carbapenem Resistant)      .Urine Clean Catch (Midstream)  03-15 @ 00:52   No growth  --  --          Radiology Results    < from: Xray Chest 1 View- PORTABLE-Urgent (Xray Chest 1 View- PORTABLE-Urgent .) (04.20.21 @ 05:18) >    Heart magnified by technique.    Endotracheal tube, nasogastric tube, and right jugular line remain.    Diffuse advanced bilateral infiltrates again noted. Catheter left chest tube remains. Left lung appears expanded.    Chest is similar to earlier study of April 19.    Follow-up AP chest on April 20, 2021 at 5:20 AM. Chest is unchanged.    IMPRESSION: Unchanged advanced infiltrates and catheter left chest tube.    < end of copied text >      Meds    MEDICATIONS  (STANDING):  acetaminophen    Suspension .. 650 milliGRAM(s) Oral <User Schedule>  ALBUTerol    90 MICROgram(s) HFA Inhaler 2 Puff(s) Inhalation every 6 hours  ascorbic acid 1000 milliGRAM(s) Oral daily  aspirin  chewable 81 milliGRAM(s) Oral daily  chlorhexidine 0.12% Liquid 15 milliLiter(s) Oral Mucosa two times a day  chlorhexidine 2% Cloths 1 Application(s) Topical <User Schedule>  enoxaparin Injectable 40 milliGRAM(s) SubCutaneous daily  fentaNYL   Infusion 3.994 MICROgram(s)/kG/Hr (33.5 mL/Hr) IV Continuous <Continuous>  insulin lispro (ADMELOG) corrective regimen sliding scale   SubCutaneous every 6 hours  midazolam Infusion 0.02 mG/kG/Hr (1.68 mL/Hr) IV Continuous <Continuous>  midodrine. 10 milliGRAM(s) Oral three times a day  pantoprazole  Injectable 40 milliGRAM(s) IV Push daily  phenylephrine    Infusion 1.5 MICROgram(s)/kG/Min (23.6 mL/Hr) IV Continuous <Continuous>  polyethylene glycol 3350 17 Gram(s) Oral daily  senna 2 Tablet(s) Oral at bedtime      MEDICATIONS  (PRN):  artificial  tears Solution 1 Drop(s) Both EYES every 4 hours PRN Dry Eyes  LORazepam     Tablet 1 milliGRAM(s) Oral every 4 hours PRN Agitation  sodium chloride 0.9% lock flush 10 milliLiter(s) IV Push every 1 hour PRN Pre/post blood products, medications, blood draw, and to maintain line patency      Physical Exam    Neuro :  no focal deficits  Respiratory: CTA B/L  CV: RRR, S1S2, no murmurs,   Abdominal: Soft, NT, ND +BS,  Extremities: No edema, + peripheral pulses      ASSESSMENT    Hypoxemia 2nd to covid pna   transaminitis  prediabetes  h/o appendectomy  cholecystectomy        PLAN    contact and airborne isolation  d/c remdesevir given covid ab positive noted   completed dexamethasone   started pulse steroids for 3 days - 250mg solumedrol bid now tapered off 4/14/21  cont asa, vit c,    cont albuterol inhaler   pulm f/u  procalcitonin, D-dimer, crp, ldh, ferritin, lactate noted ,    tmx 99.7  cont tylenol prn,   cont robitussin prn   pt on fentanyl, phenylephrine, midazolam drip  s/p intubation 3/29/21  O2 sat (89% - 100%) mech vent 60%  O2 via mech vent and taper fio2 as tolerated   vent mgmt as per icu  xray 3/19/21 with pneumomediastinum  rept cxr with New trace right apical pneumothorax. New mild left apical pneumothorax. Grossly stable small pneumomediastinum.  Soft tissue emphysema at the neck bases bilaterally. Grossly stable bilateral pulmonary infiltrates noted.   cxr 2/24 with No evidence of pneumothorax can be appreciated on the available image. This may be related to patient positioning. Evidence of pneumomediastinum and subcutaneous emphysema in the lower neck is again noted. There are patchy bibasilar infiltrates and elevated right hemidiaphragm noted.   cxr 3/29/21 with No significant change bilateral infiltrates. There is a small simple left apical pneumothorax. No significant pleural effusion. Bilateral subcutaneous emphysema similar to prior.   pt intubated 6AM 3/29/21,   XRs on 7AM and 5PM with no obvious increase of ptx  cxr 4/4/21 with Improving bilateral airspace disease noted    cxr 4/8/21 with Small left pneumothorax noted.  thoracic surg f/u   s/p L chest tube replacement 4/18/21 for recurrence of left pneumothorax   cxr 4/20/21 with Unchanged advanced infiltrates and catheter left chest tube noted above.  CXR 4/11/21 with : No interval change compared to one day prior. No pneumothorax noted.   Daily CXR  Monitor O2 status   pt for possible trach today  id f/u   Continue Meropenem 1g iv q8  f/u blood cx   speutum cx with Enterobacter aerogenes (Carbapenem Resistant) noted above  andrea   lispro ss   prognosis poor  cont current meds  mgmt as per icu

## 2021-04-20 NOTE — CHART NOTE - NSCHARTNOTEFT_GEN_A_CORE
Assessment:   Patient is a 55y old  Male who presents with a chief complaint of SOB (2021 13:37). Intubated. Last Tf noted on flow  PM, now on hold (Possible trach today vs tomorrow noted. Previous note "off Propofol" with new TF recommendations        Diet Prescription: Diet, NPO with Tube Feed:   Tube Feeding Modality: Nasogastric  Glucerna 1.5 Leonardo  Total Volume for 24 Hours (mL): 600  Continuous  Starting Tube Feed Rate {mL per Hour}: 10  Increase Tube Feed Rate by (mL): 10     Every hour  Until Goal Tube Feed Rate (mL per Hour): 25  Tube Feed Duration (in Hours): 24  Tube Feed Start Time: 02:00  No Carb Prosource (1pkg = 15gms Protein)     Qty per Day:  2 (21 @ 16:49)        Daily     Daily Weight in k.7 (2021 07:00)  Weight in k.7 (2021 08:00)  Weight in k.3 (2021 08:00)  Weight in k.1 (15 Apr 2021 08:00)  Weight in k.4 (2021 07:00)  Weight in k.5 (2021 08:00)  Weight in k.7 (2021 07:00)  Weight in k (10 Apr 2021 07:00)  Weight in k.6 (2021 07:30)  Weight in k (2021 07:30)  Weight in k.9 (2021 07:30)    % Weight Change: 2+ generalized edema (noted )    Pertinent Medications: MEDICATIONS  (STANDING):  ALBUTerol    90 MICROgram(s) HFA Inhaler 2 Puff(s) Inhalation every 6 hours  ascorbic acid 1000 milliGRAM(s) Oral daily  aspirin  chewable 81 milliGRAM(s) Oral daily  chlorhexidine 0.12% Liquid 15 milliLiter(s) Oral Mucosa two times a day  chlorhexidine 2% Cloths 1 Application(s) Topical <User Schedule>  enoxaparin Injectable 40 milliGRAM(s) SubCutaneous daily  fentaNYL   Infusion 3.994 MICROgram(s)/kG/Hr (33.5 mL/Hr) IV Continuous <Continuous>  insulin lispro (ADMELOG) corrective regimen sliding scale   SubCutaneous every 6 hours  midazolam Infusion 0.02 mG/kG/Hr (1.68 mL/Hr) IV Continuous <Continuous>  midodrine. 10 milliGRAM(s) Oral three times a day  pantoprazole  Injectable 40 milliGRAM(s) IV Push daily  phenylephrine    Infusion 1.5 MICROgram(s)/kG/Min (23.6 mL/Hr) IV Continuous <Continuous>  polyethylene glycol 3350 17 Gram(s) Oral daily  senna 2 Tablet(s) Oral at bedtime    MEDICATIONS  (PRN):  acetaminophen    Suspension .. 650 milliGRAM(s) Oral every 6 hours PRN Temp greater or equal to 38C (100.4F)  artificial  tears Solution 1 Drop(s) Both EYES every 4 hours PRN Dry Eyes  sodium chloride 0.9% lock flush 10 milliLiter(s) IV Push every 1 hour PRN Pre/post blood products, medications, blood draw, and to maintain line patency    Pertinent Labs:  Na145 mmol/L Glu 110 mg/dL<H> K+ 5.0 mmol/L Cr  0.46 mg/dL<L> BUN 28 mg/dL<H>  Phos 4.5 mg/dL  Alb 2.5 g/dL<L>  Chol --    LDL --    HDL --    Trig 340 mg/dL<H>     CAPILLARY BLOOD GLUCOSE      POCT Blood Glucose.: 127 mg/dL (2021 12:17)  POCT Blood Glucose.: 119 mg/dL (2021 05:48)  POCT Blood Glucose.: 157 mg/dL (2021 18:54)        Estimated Needs:   [ ] no change since previous assessment  [ ] recalculated:         Nutrition Diagnosis: [x ] severe PCM      Interventions:   Recommend  [ ] Change Diet To:  [ ] Nutrition Supplement  [x ] Nutrition Support: Glucerna 1.5 40 x24 (goal): 960 ml, 1440 kcals, 79 gm protein. MD to monitor. RD available.       Monitoring and Evaluation:    [ x ] Tolerance to diet prescription [ x ] weights [ x ] labs[ x ] follow up per protocol  [ ] other:

## 2021-04-20 NOTE — PROGRESS NOTE ADULT - ASSESSMENT
Pneumonia  Fevers - improved  Leukocytosis - improved  COVID - 19 infection    Plan: No need for further antibiotics as patient completed course of Meropenem  prognosis is poor  Time spent - 34 mins.             Pneumonia  Fevers - improved  Leukocytosis - improved  COVID - 19 infection    Plan: No need for further antibiotics as patient completed course of Meropenem  prognosis is poor  Time spent - 34 mins.  reconsult prn.    I agree with above

## 2021-04-20 NOTE — PROVIDER CONTACT NOTE (CHANGE IN STATUS NOTIFICATION) - ASSESSMENT
Pt ventilated, appearing to be synchronous with the ventilator. Intermittent diaphragmatic breathing. Fent/Versed/Marlo gtt.
Pt on precedex/fent/versed/angel gtt. MAP 65, 94% spO2, .

## 2021-04-20 NOTE — PROGRESS NOTE ADULT - ASSESSMENT
1. Acute hypoxic respiratory failure  2. ARDS 2/2 Covid pneumonia  3. Transaminitis  4. Prediabetes  5. Abnormal TSH    =================== Neuro============================  Alert and oriented x 3 at baseline   intubated and sedated 3/29 on Vent  Continue Versed to help with vent synchrony with Fentanyl  CT head unremarkable     ================= Cardiovascular==========================  # Hypotension      C/w midodrine   taper off phenylephrine      # TACHYCARDIA: improving  -HR in 90s-100s.   -continue monitoring     ================- Pulm=================================  #Acute hypoxic respiratory failure: secondary to Covid pneumonia  #ARDS  -was on HFNr then got intubated 3/29  -VC Vent setting : 30/400/60/8  -D-dimer initially elevated on presentation, Slowly rising again   - Patient on increased FiO2 requirement, will monitor   - Thoracic surgery will take for Trach and peg once O2 requirements come down   s/p Pigtail on left chest , removed on 4/15  - Large Pneumothorax was noted on 4/18 on left side, s/p chest tube placement    -Remdesivir was discontinued due to positive antibodies   - c/w supportive care   - trend COVID  markers  - Finished Dexamethasone   - prednisone taper, Finished      # Bacterial Pneumonia  - stable infiltrate on CXR ,likely developed VAP, on Zosyn, s/p 1 dose of Vanco   - continue Cefepime 4/19, f/u ID for further recs  - MRSA negative from 3/26  - Sputum Cx growing few gram negative rods,   - Pulm. dr. Ryan  - ID consult Dr Anand       #Pneumothorax and pneumomediastinum: resolved  -X-ray chest: tiny left apical pneumothorax  -Thoracic surgery following  -s/p Chest tube placed 4/8 pigtail to wall suction.  d/c'd 4/15  -X-ray monitoring daily, PTX resolved      ==================ID===================================  Likely bacterial pneumonia at present  -leukocytosis resolved  -Still spiking fevers,  started on Alternative doses of Motrin and Tylenol for fever control  - Continue Meropenem (D 6), f/u ID for duration   - C-procal elevated  -plan as above     ================= Nephro================================  -pitting edema to both lower arms  -on condom cath   -monitor I/Os   -Net negative , Urine output monitoring  -s/p Albumin x 2 days on 4/10  -On Lasix 40mg BID with Albumin 25% Q6 for 48 hours to help with urine output and fluid status.( 4/15) Albumin finished   - s/p 1 dose of diamox  -C/w HCTZ 50mg daily   -monitor lytes, CMP    =================GI====================================  Transaminitis:   likely secondary to Covid19   - and  on presentation   hepatitis panel -ve  -Improved  -continue to monitor LFT    diet:   NGT (3/29) with tube feed  bowel regimen   Last BM 4/16      ================ Heme==================================  Elevated d-dimer: likely secondary to Covid  -d-dimer 423 on admission  -last D-dimer 841<---1313  -continue prophylactic Lovenox 40 mg daily    Anemia  Hgb 7.3, no active signs of bleeding  repeat CBC pm  f/u iron panel, FOBT  will transfuse  if hgb drops.     =================Endocrine===============================  Prediabetes:    -A1c  5.8  -BS controlled  -continue HSS  -monitor FS while on steroids    Abnormal TSH:  -TSH level noted 0.26,   -repeat TSH 0.37 and Free T4 1.82    ================= Skin/Catheters============================  No rashes. Peripheral IV lines.   RIJ 4/13  RRA 3/29, Removed    - =================Prophylaxis =============================  Lovenox for DVT proph : on hold for now for anemia, may resume when CBC improving  Protonix for  GI proph    ==================GOC==================================   FULL CODE     1. Acute hypoxic respiratory failure  2. ARDS 2/2 Covid pneumonia  3. Transaminitis  4. Prediabetes  5. Abnormal TSH    =================== Neuro============================  Alert and oriented x 3 at baseline   intubated and sedated 3/29 on Vent  Continue Versed to help with vent synchrony with Fentanyl  CT head unremarkable     ================= Cardiovascular==========================  # Hypotension      C/w midodrine   taper off phenylephrine as tolerated       # TACHYCARDIA: improving  -HR in 90s-100s.   -continue monitoring     ================- Pulm=================================  #Acute hypoxic respiratory failure: secondary to Covid pneumonia  #ARDS  -was on HFNr then got intubated 3/29  -VC Vent setting : 30/320/70/5  -D-dimer initially elevated on presentation, Slowly rising again   - Patient on increased FiO2 requirement, will monitor   - f/u thoracic surgery for trach and peg on 4/20  - s/p NImbex and Epifanio at different occasions for vent synchrony  - s/p Pigtail on left chest , removed on 4/15  - Large Pneumothorax was noted on 4/18 on left side, s/p chest tube placement    -Remdesivir was discontinued due to positive antibodies   - c/w supportive care   - trend COVID  markers  - Finished Dexamethasone   - prednisone taper, Finished      # Bacterial Pneumonia  - stable infiltrate on CXR ,likely developed VAP, on Zosyn, s/p 1 dose of Vanco   - continue Cefepime 4/19, f/u ID for further recs  - MRSA negative from 3/26  - Sputum Cx growing few gram negative rods,   - Pulm. dr. Ryan  - ID consult Dr Anand       #Pneumothorax and pneumomediastinum: resolved  -X-ray chest: tiny left apical pneumothorax  -Thoracic surgery following  -s/p Chest tube placed 4/8 pigtail to wall suction.  d/c'd 4/15  -X-ray monitoring daily, PTX resolved      ==================ID===================================  Likely bacterial pneumonia at present  -leukocytosis resolved  -Still spiking fevers,  started on Alternative doses of Motrin and Tylenol for fever control  - Continue Meropenem (D 6), f/u ID for duration   - C-procal elevated  -plan as above     ================= Nephro================================  -pitting edema to both lower arms  -on condom cath   -monitor I/Os   -Net negative , Urine output monitoring  -s/p Albumin x 2 days on 4/10  -Lasix 40mg BID with Albumin 25% Q6 for 48 hours to help with urine output and fluid status.( 4/15) Albumin finished , was D/jennifer on 4/19, urine output decreasing  - Will give another dose   - s/p 1 dose of Diamox  -C/w HCTZ 50mg daily   -monitor lytes, CMP    =================GI====================================  Transaminitis:   likely secondary to Covid19   - and  on presentation   hepatitis panel -ve  -Improved  -continue to monitor LFT    diet:   NGT (3/29) with tube feed  bowel regimen   Last BM 4/16      ================ Heme==================================  Elevated d-dimer: likely secondary to Covid  -d-dimer 423 on admission  -last D-dimer 841<---1313  -continue prophylactic Lovenox 40 mg daily    Anemia  Hgb 8.8, no active signs of bleeding  f/u iron panel, FOBT  will transfuse  if hgb drops.     =================Endocrine===============================  Prediabetes:    -A1c  5.8  -BS controlled  -continue HSS  -monitor FS while on steroids    Abnormal TSH:  -TSH level noted 0.26,   -repeat TSH 0.37 and Free T4 1.82    ================= Skin/Catheters============================  No rashes. Peripheral IV lines.   RIJ 4/13  RRA 3/29, Removed  RBA 4/20    - =================Prophylaxis =============================  Lovenox for DVT proph : Lovenox 40mg SQ daily  Protonix for  GI proph    ==================GOC==================================   FULL CODE

## 2021-04-20 NOTE — CHART NOTE - NSCHARTNOTEFT_GEN_A_CORE
Pt with COVID+ acute hypoxic respiratory failure, intubated 3/29    Plan for for Trach/PEG tomorrow 4/21/21 in the OR  NPO after midnight  Please obtain coagulation profile, type and screen within 72 hours, and COVID swab within 48 hrs  Consent to be obtained by Thoracic Surgery Attending  Please hold Lovenox if safe Pt with COVID+ acute hypoxic respiratory failure, intubated 3/29  Trach/PEG cancelled for today    Plan for for Trach/PEG tomorrow 4/21/21 in the OR  NPO after midnight  Please obtain coagulation profile, type and screen within 72 hours, and COVID swab within 48 hrs  Consent to be obtained by Thoracic Surgery Attending  Please hold Lovenox if safe

## 2021-04-20 NOTE — PROGRESS NOTE ADULT - SUBJECTIVE AND OBJECTIVE BOX
ICU visit:  55y Male is under our care for    REVIEW OF SYSTEMS:  [  ] Not able to elicit  General:	  Chest:	  GI:	  :  Skin:	  Musculoskeletal:	  Neuro:	    MEDS:    ALLERGIES: Allergies    No Known Allergies    Intolerances        VITALS:  ICU Vital Signs Last 24 Hrs  T(C): 37.6 (20 Apr 2021 10:30), Max: 37.6 (20 Apr 2021 10:30)  T(F): 99.7 (20 Apr 2021 10:30), Max: 99.7 (20 Apr 2021 10:30)  HR: 140 (20 Apr 2021 13:00) (77 - 142)  BP: 129/57 (20 Apr 2021 12:00) (61/50 - 141/62)  BP(mean): 73 (20 Apr 2021 12:00) (47 - 86)  ABP: 135/67 (20 Apr 2021 13:00) (95/44 - 135/68)  ABP(mean): 88 (20 Apr 2021 13:00) (61 - 90)  RR: 27 (20 Apr 2021 13:00) (7 - 31)  SpO2: 96% (20 Apr 2021 13:00) (89% - 100%)      PHYSICAL EXAM:  HEENT:  Neck:  Respiratory:  Cardiovascular:  Gastrointestinal:  Extremities:  Skin:  Ortho:  Neuro:    LABS/DIAGNOSTIC TESTS:                        8.8    9.61  )-----------( 375      ( 20 Apr 2021 03:51 )             30.3     WBC Count: 9.61 K/uL (04-20 @ 03:51)  WBC Count: 10.23 K/uL (04-19 @ 04:14)  WBC Count: 11.62 K/uL (04-18 @ 06:39)  WBC Count: 8.49 K/uL (04-17 @ 05:59)  WBC Count: 8.02 K/uL (04-16 @ 17:25)    04-20    145  |  102  |  28<H>  ----------------------------<  110<H>  5.0   |  52<HH>  |  0.46<L>    Ca    8.5      20 Apr 2021 03:50  Phos  4.5     04-20  Mg     2.4     04-20    TPro  6.3  /  Alb  2.5<L>  /  TBili  0.3  /  DBili  x   /  AST  29  /  ALT  28  /  AlkPhos  110  04-20        ABG - ( 20 Apr 2021 09:22 )  pH: 7.29  /  pCO2: 114   /  pO2: 65    / HCO3: 55    / Base Excess: 21.9  /  SaO2: 96                CULTURES:   .Sputum Sputum  04-16 @ 04:42   Moderate Enterobacter aerogenes (Carbapenem Resistant)  Normal Respiratory Monserrat present  --  Enterobacter aerogenes (Carbapenem Resistant)      .Urine Clean Catch (Midstream)  03-15 @ 00:52   No growth  --  --        RADIOLOGY:  no new studies ICU visit:  55y Male is under our care for    REVIEW OF SYSTEMS:  [  ] Not able to elicit  General:	  Chest:	  GI:	  :  Skin:	  Musculoskeletal:	  Neuro:	    MEDS:    ALLERGIES: Allergies    No Known Allergies    Intolerances        VITALS:  ICU Vital Signs Last 24 Hrs  T(C): 37.6 (20 Apr 2021 10:30), Max: 37.6 (20 Apr 2021 10:30)  T(F): 99.7 (20 Apr 2021 10:30), Max: 99.7 (20 Apr 2021 10:30)  HR: 140 (20 Apr 2021 13:00) (77 - 142)  BP: 129/57 (20 Apr 2021 12:00) (61/50 - 141/62)  BP(mean): 73 (20 Apr 2021 12:00) (47 - 86)  ABP: 135/67 (20 Apr 2021 13:00) (95/44 - 135/68)  ABP(mean): 88 (20 Apr 2021 13:00) (61 - 90)  RR: 27 (20 Apr 2021 13:00) (7 - 31)  SpO2: 96% (20 Apr 2021 13:00) (89% - 100%)      PHYSICAL EXAM:  HEENT:  Neck:  Respiratory:  Cardiovascular:  Gastrointestinal:  Extremities:  Skin:  Ortho:  Neuro:    LABS/DIAGNOSTIC TESTS:                        8.8    9.61  )-----------( 375      ( 20 Apr 2021 03:51 )             30.3     WBC Count: 9.61 K/uL (04-20 @ 03:51)  WBC Count: 10.23 K/uL (04-19 @ 04:14)  WBC Count: 11.62 K/uL (04-18 @ 06:39)  WBC Count: 8.49 K/uL (04-17 @ 05:59)  WBC Count: 8.02 K/uL (04-16 @ 17:25)    04-20    145  |  102  |  28<H>  ----------------------------<  110<H>  5.0   |  52<HH>  |  0.46<L>    Ca    8.5      20 Apr 2021 03:50  Phos  4.5     04-20  Mg     2.4     04-20    TPro  6.3  /  Alb  2.5<L>  /  TBili  0.3  /  DBili  x   /  AST  29  /  ALT  28  /  AlkPhos  110  04-20        ABG - ( 20 Apr 2021 09:22 )  pH: 7.29  /  pCO2: 114   /  pO2: 65    / HCO3: 55    / Base Excess: 21.9  /  SaO2: 96                CULTURES:   .Sputum Sputum  04-16 @ 04:42   Moderate Enterobacter aerogenes (Carbapenem Resistant)  Normal Respiratory Monserrat present  --  Enterobacter aerogenes (Carbapenem Resistant)      .Urine Clean Catch (Midstream)  03-15 @ 00:52   No growth  --  --        RADIOLOGY:    < from: Xray Chest 1 View- PORTABLE-Urgent (Xray Chest 1 View- PORTABLE-Urgent .) (04.20.21 @ 05:18) >    EXAM:  XR CHEST PORTABLE URGENT 1V                          EXAM:  XR CHEST PORTABLE IMMED 1V                            PROCEDURE DATE:  04/19/2021          INTERPRETATION:  AP semierect chest on April 19, 2021 at 10:23 PM. Patient requires intubation.    Heart magnified by technique.    Endotracheal tube, nasogastric tube, and right jugular line remain.    Diffuse advanced bilateral infiltrates again noted. Catheter left chest tube remains. Left lung appears expanded.    Chest is similar to earlier study of April 19.    Follow-up AP chest on April 20, 2021 at 5:20 AM. Chest is unchanged.    IMPRESSION: Unchanged advanced infiltrates and catheter left chest tube.          < end of copied text >   ICU visit:  55y Male is under our care for pneumonia and fevers.  Patient was seen in the ICU still intubated and vent dependent with an FI02 of 70% and an oxygen saturation of 96% with a PEEP of 5.  Patient is currently not on any pressors with a blood pressure of 135/60.  Patient might be going for a tracheostomy today, as per the resident.  His chest x-ray shows persistent advanced infiltrates, remains afebrile and WBC count is WNL.     REVIEW OF SYSTEMS:  [ x ] Not able to elicit      MEDS:    ALLERGIES: Allergies    No Known Allergies    Intolerances        VITALS:  ICU Vital Signs Last 24 Hrs  T(C): 37.6 (20 Apr 2021 10:30), Max: 37.6 (20 Apr 2021 10:30)  T(F): 99.7 (20 Apr 2021 10:30), Max: 99.7 (20 Apr 2021 10:30)  HR: 140 (20 Apr 2021 13:00) (77 - 142)  BP: 129/57 (20 Apr 2021 12:00) (61/50 - 141/62)  BP(mean): 73 (20 Apr 2021 12:00) (47 - 86)  ABP: 135/67 (20 Apr 2021 13:00) (95/44 - 135/68)  ABP(mean): 88 (20 Apr 2021 13:00) (61 - 90)  RR: 27 (20 Apr 2021 13:00) (7 - 31)  SpO2: 96% (20 Apr 2021 13:00) (89% - 100%)      PHYSICAL EXAM:  Constitutional: ETT  HEENT: normocephalic with moist oral mucosa  Neck: supple, right IJ, no JVD  Respiratory: lungs clear no rales no rhonchi  Cardiovascular: S1 S2 reg no murmurs  Gastrointestinal: +BS with soft, nondistended abdomen; nontender  : Cerda catheter in place  Extremities: bilateral arm and hand edema + 2  Skin: no rashes  Ortho: no jt swelling  Neuro: AAO x 0    LABS/DIAGNOSTIC TESTS:                        8.8    9.61  )-----------( 375      ( 20 Apr 2021 03:51 )             30.3     WBC Count: 9.61 K/uL (04-20 @ 03:51)  WBC Count: 10.23 K/uL (04-19 @ 04:14)  WBC Count: 11.62 K/uL (04-18 @ 06:39)  WBC Count: 8.49 K/uL (04-17 @ 05:59)  WBC Count: 8.02 K/uL (04-16 @ 17:25)    04-20    145  |  102  |  28<H>  ----------------------------<  110<H>  5.0   |  52<HH>  |  0.46<L>    Ca    8.5      20 Apr 2021 03:50  Phos  4.5     04-20  Mg     2.4     04-20    TPro  6.3  /  Alb  2.5<L>  /  TBili  0.3  /  DBili  x   /  AST  29  /  ALT  28  /  AlkPhos  110  04-20        ABG - ( 20 Apr 2021 09:22 )  pH: 7.29  /  pCO2: 114   /  pO2: 65    / HCO3: 55    / Base Excess: 21.9  /  SaO2: 96                CULTURES:   .Sputum Sputum  04-16 @ 04:42   Moderate Enterobacter aerogenes (Carbapenem Resistant)  Normal Respiratory Monserrat present  --  Enterobacter aerogenes (Carbapenem Resistant)      .Urine Clean Catch (Midstream)  03-15 @ 00:52   No growth  --  --        RADIOLOGY:    < from: Xray Chest 1 View- PORTABLE-Urgent (Xray Chest 1 View- PORTABLE-Urgent .) (04.20.21 @ 05:18) >    EXAM:  XR CHEST PORTABLE URGENT 1V                          EXAM:  XR CHEST PORTABLE IMMED 1V                            PROCEDURE DATE:  04/19/2021          INTERPRETATION:  AP semierect chest on April 19, 2021 at 10:23 PM. Patient requires intubation.    Heart magnified by technique.    Endotracheal tube, nasogastric tube, and right jugular line remain.    Diffuse advanced bilateral infiltrates again noted. Catheter left chest tube remains. Left lung appears expanded.    Chest is similar to earlier study of April 19.    Follow-up AP chest on April 20, 2021 at 5:20 AM. Chest is unchanged.    IMPRESSION: Unchanged advanced infiltrates and catheter left chest tube.          < end of copied text >

## 2021-04-20 NOTE — PROGRESS NOTE ADULT - ASSESSMENT
Assessment and Recommendation:   Problem/Recommendation - 1:  Problem: COVID-19. Recommendation: isolation precautions  Cont mechanical ventilation  Taper FIO2 as tolerated  Wean as tolerated  Pulmonary toilet  Supplemental nutrition  ICU management.   Monitor oxygen sat  Monitor LFT, LDH, CRP, D-Dimer, Ferritin and procalcitonin  Vit C, D and zinc supp  Montelukast 10 mgs po Qhs  Daily CXR   DVT and GI PPX   May require trach and Peg     Problem/Recommendation - 2:  ·  Problem: UTI (urinary tract infection).  Recommendation: F.U cultures   Off Antibiotics.     Problem/Recommendation - 3:  ·  Problem: Chest pain.  Recommendation: likely related to Covid-19 infection.     Problem/Recommendation - 4:  ·  Problem: Transaminitis.  Recommendation: monitor LFTs  GI F/U     Problem/Recommendation - 5:  ·  Problem: Pneumothorax.  Recommendation: L pigtail   Thoracic sx follow up  Daily  CXR   Management per ICU    Assessment and Recommendation:   Problem/Recommendation - 1:  Problem: COVID-19. Recommendation: isolation precautions  Cont mechanical ventilation  Taper FIO2 as tolerated  Wean as tolerated  Pulmonary toilet  Supplemental nutrition  ICU management.   Monitor oxygen sat  Monitor LFT, LDH, CRP, D-Dimer, Ferritin and procalcitonin  Vit C, D and zinc supp  Montelukast 10 mgs po Qhs  Daily CXR   DVT and GI PPX   Planning for trach/peg tomorrow in OR.     Problem/Recommendation - 2:  ·  Problem: UTI (urinary tract infection).  Recommendation: F.U cultures   Off Antibiotics.     Problem/Recommendation - 3:  ·  Problem: Chest pain.  Recommendation: likely related to Covid-19 infection.     Problem/Recommendation - 4:  ·  Problem: Transaminitis.  Recommendation: monitor LFTs  GI F/U     Problem/Recommendation - 5:  ·  Problem: Pneumothorax.  Recommendation: L pigtail   Thoracic sx follow up  Daily  CXR   Management per ICU

## 2021-04-20 NOTE — PROGRESS NOTE ADULT - SUBJECTIVE AND OBJECTIVE BOX
Patient is a 55y old  Male who presents with a chief complaint of SOB (20 Apr 2021 13:10)      INTERVAL HPI/OVERNIGHT EVENTS:      VITAL SIGNS:  T(F): 100.3 (04-20-21 @ 13:00)  HR: 140 (04-20-21 @ 13:00)  BP: 129/57 (04-20-21 @ 12:00)  RR: 27 (04-20-21 @ 13:00)  SpO2: 96% (04-20-21 @ 13:00)  Wt(kg): --  I&O's Detail    19 Apr 2021 07:01  -  20 Apr 2021 07:00  --------------------------------------------------------  IN:    Dexmedetomidine: 18.6 mL    FentaNYL: 804 mL    Glucerna 1.5: 275 mL    Midazolam: 324 mL    Phenylephrine: 567.9 mL  Total IN: 1989.5 mL    OUT:    Chest Tube (mL): 110 mL    Incontinent per Condom Catheter (mL): 1920 mL  Total OUT: 2030 mL    Total NET: -40.5 mL      20 Apr 2021 07:01  -  20 Apr 2021 13:37  --------------------------------------------------------  IN:    FentaNYL: 167.5 mL    Midazolam: 63.5 mL    Phenylephrine: 19.6 mL  Total IN: 250.6 mL    OUT:    Incontinent per Condom Catheter (mL): 625 mL  Total OUT: 625 mL    Total NET: -374.4 mL        Mode: AC/ CMV (Assist Control/ Continuous Mandatory Ventilation)  RR (machine): 30  TV (machine): 320  FiO2: 70  PEEP: 5  ITime: 1  MAP: 11  PC: 20  PIP: 30        REVIEW OF SYSTEMS:    CONSTITUTIONAL:  No fevers, chills, sweats    HEENT:  Eyes:  No diplopia or blurred vision. ENT:  No earache, sore throat or runny nose.    CARDIOVASCULAR:  No pressure, squeezing, tightness, or heaviness about the chest; no palpitations.    RESPIRATORY:  Per HPI    GASTROINTESTINAL:  No abdominal pain, nausea, vomiting or diarrhea.    GENITOURINARY:  No dysuria, frequency or urgency.    NEUROLOGIC:  No paresthesias, fasciculations, seizures or weakness.    PSYCHIATRIC:  No disorder of thought or mood.      PHYSICAL EXAM:    Constitutional: Well developed and nourished  Eyes:Perrla  ENMT: normal  Neck:supple  Respiratory: good air entry  Cardiovascular: S1 S2 regular  Gastrointestinal: Soft, Non tender  Extremities: No edema  Vascular:normal  Neurological:Awake, alert,Ox3  Musculoskeletal:Normal      MEDICATIONS  (STANDING):  ALBUTerol    90 MICROgram(s) HFA Inhaler 2 Puff(s) Inhalation every 6 hours  ascorbic acid 1000 milliGRAM(s) Oral daily  aspirin  chewable 81 milliGRAM(s) Oral daily  chlorhexidine 0.12% Liquid 15 milliLiter(s) Oral Mucosa two times a day  chlorhexidine 2% Cloths 1 Application(s) Topical <User Schedule>  enoxaparin Injectable 40 milliGRAM(s) SubCutaneous daily  fentaNYL   Infusion 3.994 MICROgram(s)/kG/Hr (33.5 mL/Hr) IV Continuous <Continuous>  insulin lispro (ADMELOG) corrective regimen sliding scale   SubCutaneous every 6 hours  midazolam Infusion 0.02 mG/kG/Hr (1.68 mL/Hr) IV Continuous <Continuous>  midodrine. 10 milliGRAM(s) Oral three times a day  pantoprazole  Injectable 40 milliGRAM(s) IV Push daily  phenylephrine    Infusion 1.5 MICROgram(s)/kG/Min (23.6 mL/Hr) IV Continuous <Continuous>  polyethylene glycol 3350 17 Gram(s) Oral daily  senna 2 Tablet(s) Oral at bedtime    MEDICATIONS  (PRN):  acetaminophen    Suspension .. 650 milliGRAM(s) Oral every 6 hours PRN Temp greater or equal to 38C (100.4F)  artificial  tears Solution 1 Drop(s) Both EYES every 4 hours PRN Dry Eyes  sodium chloride 0.9% lock flush 10 milliLiter(s) IV Push every 1 hour PRN Pre/post blood products, medications, blood draw, and to maintain line patency      Allergies    No Known Allergies    Intolerances        LABS:                        8.8    9.61  )-----------( 375      ( 20 Apr 2021 03:51 )             30.3     04-20    145  |  102  |  28<H>  ----------------------------<  110<H>  5.0   |  52<HH>  |  0.46<L>    Ca    8.5      20 Apr 2021 03:50  Phos  4.5     04-20  Mg     2.4     04-20    TPro  6.3  /  Alb  2.5<L>  /  TBili  0.3  /  DBili  x   /  AST  29  /  ALT  28  /  AlkPhos  110  04-20    PT/INR - ( 19 Apr 2021 04:14 )   PT: 14.2 sec;   INR: 1.20 ratio             ABG - ( 20 Apr 2021 09:22 )  pH, Arterial: 7.29  pH, Blood: x     /  pCO2: 114   /  pO2: 65    / HCO3: 55    / Base Excess: 21.9  /  SaO2: 96                    CAPILLARY BLOOD GLUCOSE      POCT Blood Glucose.: 127 mg/dL (20 Apr 2021 12:17)  POCT Blood Glucose.: 119 mg/dL (20 Apr 2021 05:48)  POCT Blood Glucose.: 157 mg/dL (19 Apr 2021 18:54)    pro-bnp -- 04-20 @ 03:50     d-dimer 2911  04-20 @ 03:50  pro-bnp -- 04-19 @ 04:14     d-dimer 2394  04-19 @ 04:14  pro-bnp -- 04-18 @ 06:39     d-dimer 1778  04-18 @ 06:39  pro-bnp -- 04-15 @ 06:08     d-dimer 841  04-15 @ 06:08      RADIOLOGY & ADDITIONAL TESTS:    CXR:    Ct scan chest:    ekg;    echo: Patient is a 55y old  Male who presents with a chief complaint of SOB (20 Apr 2021 13:10)  Seen/examined 13:30pm. Trach and PEG planned for tomorrow.     INTERVAL HPI/OVERNIGHT EVENTS:      VITAL SIGNS:  T(F): 100.3 (04-20-21 @ 13:00)  HR: 140 (04-20-21 @ 13:00)  BP: 129/57 (04-20-21 @ 12:00)  RR: 27 (04-20-21 @ 13:00)  SpO2: 96% (04-20-21 @ 13:00)  Wt(kg): --  I&O's Detail    19 Apr 2021 07:01  -  20 Apr 2021 07:00  --------------------------------------------------------  IN:    Dexmedetomidine: 18.6 mL    FentaNYL: 804 mL    Glucerna 1.5: 275 mL    Midazolam: 324 mL    Phenylephrine: 567.9 mL  Total IN: 1989.5 mL    OUT:    Chest Tube (mL): 110 mL    Incontinent per Condom Catheter (mL): 1920 mL  Total OUT: 2030 mL    Total NET: -40.5 mL      20 Apr 2021 07:01  -  20 Apr 2021 13:37  --------------------------------------------------------  IN:    FentaNYL: 167.5 mL    Midazolam: 63.5 mL    Phenylephrine: 19.6 mL  Total IN: 250.6 mL    OUT:    Incontinent per Condom Catheter (mL): 625 mL  Total OUT: 625 mL    Total NET: -374.4 mL        Mode: AC/ CMV (Assist Control/ Continuous Mandatory Ventilation)  RR (machine): 30  TV (machine): 320  FiO2: 70  PEEP: 5  ITime: 1  MAP: 11  PC: 20  PIP: 30        REVIEW OF SYSTEMS:    CONSTITUTIONAL:  No fevers, chills, sweats    HEENT:  Eyes:  No diplopia or blurred vision. ENT:  No earache, sore throat or runny nose.    CARDIOVASCULAR:  No pressure, squeezing, tightness, or heaviness about the chest; no palpitations.    RESPIRATORY:  Per HPI    GASTROINTESTINAL:  No abdominal pain, nausea, vomiting or diarrhea.    GENITOURINARY:  No dysuria, frequency or urgency.    NEUROLOGIC:  No paresthesias, fasciculations, seizures or weakness.    PSYCHIATRIC:  No disorder of thought or mood.      PHYSICAL EXAM:    Constitutional: Well developed and nourished  Eyes:Perrla  ENMT: normal  Neck:supple  Respiratory: intubated.   Cardiovascular: S1 S2 regular  Gastrointestinal: Soft, Non tender  Extremities: No edema  Vascular:normal  Neurological: sedated   Musculoskeletal:Normal      MEDICATIONS  (STANDING):  ALBUTerol    90 MICROgram(s) HFA Inhaler 2 Puff(s) Inhalation every 6 hours  ascorbic acid 1000 milliGRAM(s) Oral daily  aspirin  chewable 81 milliGRAM(s) Oral daily  chlorhexidine 0.12% Liquid 15 milliLiter(s) Oral Mucosa two times a day  chlorhexidine 2% Cloths 1 Application(s) Topical <User Schedule>  enoxaparin Injectable 40 milliGRAM(s) SubCutaneous daily  fentaNYL   Infusion 3.994 MICROgram(s)/kG/Hr (33.5 mL/Hr) IV Continuous <Continuous>  insulin lispro (ADMELOG) corrective regimen sliding scale   SubCutaneous every 6 hours  midazolam Infusion 0.02 mG/kG/Hr (1.68 mL/Hr) IV Continuous <Continuous>  midodrine. 10 milliGRAM(s) Oral three times a day  pantoprazole  Injectable 40 milliGRAM(s) IV Push daily  phenylephrine    Infusion 1.5 MICROgram(s)/kG/Min (23.6 mL/Hr) IV Continuous <Continuous>  polyethylene glycol 3350 17 Gram(s) Oral daily  senna 2 Tablet(s) Oral at bedtime    MEDICATIONS  (PRN):  acetaminophen    Suspension .. 650 milliGRAM(s) Oral every 6 hours PRN Temp greater or equal to 38C (100.4F)  artificial  tears Solution 1 Drop(s) Both EYES every 4 hours PRN Dry Eyes  sodium chloride 0.9% lock flush 10 milliLiter(s) IV Push every 1 hour PRN Pre/post blood products, medications, blood draw, and to maintain line patency      Allergies    No Known Allergies    Intolerances        LABS:                        8.8    9.61  )-----------( 375      ( 20 Apr 2021 03:51 )             30.3     04-20    145  |  102  |  28<H>  ----------------------------<  110<H>  5.0   |  52<HH>  |  0.46<L>    Ca    8.5      20 Apr 2021 03:50  Phos  4.5     04-20  Mg     2.4     04-20    TPro  6.3  /  Alb  2.5<L>  /  TBili  0.3  /  DBili  x   /  AST  29  /  ALT  28  /  AlkPhos  110  04-20    PT/INR - ( 19 Apr 2021 04:14 )   PT: 14.2 sec;   INR: 1.20 ratio             ABG - ( 20 Apr 2021 09:22 )  pH, Arterial: 7.29  pH, Blood: x     /  pCO2: 114   /  pO2: 65    / HCO3: 55    / Base Excess: 21.9  /  SaO2: 96                    CAPILLARY BLOOD GLUCOSE      POCT Blood Glucose.: 127 mg/dL (20 Apr 2021 12:17)  POCT Blood Glucose.: 119 mg/dL (20 Apr 2021 05:48)  POCT Blood Glucose.: 157 mg/dL (19 Apr 2021 18:54)    pro-bnp -- 04-20 @ 03:50     d-dimer 2911  04-20 @ 03:50  pro-bnp -- 04-19 @ 04:14     d-dimer 2394  04-19 @ 04:14  pro-bnp -- 04-18 @ 06:39     d-dimer 1778  04-18 @ 06:39  pro-bnp -- 04-15 @ 06:08     d-dimer 841  04-15 @ 06:08      RADIOLOGY & ADDITIONAL TESTS:    CXR:    < from: Xray Chest 1 View- PORTABLE-Urgent (Xray Chest 1 View- PORTABLE-Urgent .) (04.20.21 @ 05:18) >  IMPRESSION: Unchanged advanced infiltrates and catheter left chest tube.      < end of copied text >  usCt scan chest:    ekg;    echo:

## 2021-04-20 NOTE — PROGRESS NOTE ADULT - ATTENDING COMMENTS
55 yr old  man , non smoker with  moody 1990s presented 3/14 with x9 days worsening cough, subjective fevers, and SOB, with x2-3 days dysuria and central, non-radiating, constant CP. Admitted to medicine unit  for acute hypoxic respiratory failure secondary to pna from covid-19 infection .     Assessment:  1. Acute hypoxic respiratory failure  2 Covid-19 infection   3. Transaminitis  4. Prediabetes  5. Bilateral pneumothorax  6. Septic shock     Plan   -ID f/u prelim : d/c antibx ,   -Sputum post for CRE  -isolate   -pat intubated 3/29   -Mechanical ventilation with lung protective strategy, titrate down fio2 as tolerated  -schedule for trach and peg 4/20  -hold anticog and pre-op  -thoracic f/u noted   -adjust vent as per ABG  -permissive hypercapnia   -Vasopressor support  -PTX  improved post chest tube placement  -chest tube removed 4/15  -left large PTX  -4/18 left chest tube re-placed with left lung re-expansion   -keep chest tube to suction   -Cont. precedex and fentanyl  -Cont. versed/fentanyl as patient asynchronous with the vent  -Albumin/lasix for diuresis  -increase midodrine 10 mg tid  -isolation : contact and air borne   -monitor biomarkers daily, trending down   -Tube feedings, bowel regimen  -dvt/gi prophy  -hemodynamic monitoring   -Prognosis is guarded . .   -on antibx for PNA .

## 2021-04-20 NOTE — PROGRESS NOTE ADULT - SUBJECTIVE AND OBJECTIVE BOX
INTERVAL HPI/OVERNIGHT EVENTS: ***    PRESSORS: [ ] YES [ ] NO  WHICH:    ANTIBIOTICS:                  DATE STARTED:  ANTIBIOTICS:                  DATE STARTED:  ANTIBIOTICS:                  DATE STARTED:    Antimicrobial:    Cardiovascular:  midodrine. 10 milliGRAM(s) Oral three times a day  phenylephrine    Infusion 1.5 MICROgram(s)/kG/Min IV Continuous <Continuous>    Pulmonary:  ALBUTerol    90 MICROgram(s) HFA Inhaler 2 Puff(s) Inhalation every 6 hours    Hematalogic:  aspirin  chewable 81 milliGRAM(s) Oral daily  enoxaparin Injectable 40 milliGRAM(s) SubCutaneous daily    Other:  acetaminophen    Suspension .. 650 milliGRAM(s) Oral <User Schedule>  artificial  tears Solution 1 Drop(s) Both EYES every 4 hours PRN  ascorbic acid 1000 milliGRAM(s) Oral daily  chlorhexidine 0.12% Liquid 15 milliLiter(s) Oral Mucosa two times a day  chlorhexidine 2% Cloths 1 Application(s) Topical <User Schedule>  fentaNYL   Infusion 3.994 MICROgram(s)/kG/Hr IV Continuous <Continuous>  insulin lispro (ADMELOG) corrective regimen sliding scale   SubCutaneous every 6 hours  LORazepam     Tablet 1 milliGRAM(s) Oral every 4 hours PRN  midazolam Infusion 0.02 mG/kG/Hr IV Continuous <Continuous>  pantoprazole  Injectable 40 milliGRAM(s) IV Push daily  polyethylene glycol 3350 17 Gram(s) Oral daily  senna 2 Tablet(s) Oral at bedtime  sodium chloride 0.9% lock flush 10 milliLiter(s) IV Push every 1 hour PRN    acetaminophen    Suspension .. 650 milliGRAM(s) Oral <User Schedule>  ALBUTerol    90 MICROgram(s) HFA Inhaler 2 Puff(s) Inhalation every 6 hours  artificial  tears Solution 1 Drop(s) Both EYES every 4 hours PRN  ascorbic acid 1000 milliGRAM(s) Oral daily  aspirin  chewable 81 milliGRAM(s) Oral daily  chlorhexidine 0.12% Liquid 15 milliLiter(s) Oral Mucosa two times a day  chlorhexidine 2% Cloths 1 Application(s) Topical <User Schedule>  enoxaparin Injectable 40 milliGRAM(s) SubCutaneous daily  fentaNYL   Infusion 3.994 MICROgram(s)/kG/Hr IV Continuous <Continuous>  insulin lispro (ADMELOG) corrective regimen sliding scale   SubCutaneous every 6 hours  LORazepam     Tablet 1 milliGRAM(s) Oral every 4 hours PRN  midazolam Infusion 0.02 mG/kG/Hr IV Continuous <Continuous>  midodrine. 10 milliGRAM(s) Oral three times a day  pantoprazole  Injectable 40 milliGRAM(s) IV Push daily  phenylephrine    Infusion 1.5 MICROgram(s)/kG/Min IV Continuous <Continuous>  polyethylene glycol 3350 17 Gram(s) Oral daily  senna 2 Tablet(s) Oral at bedtime  sodium chloride 0.9% lock flush 10 milliLiter(s) IV Push every 1 hour PRN    Drug Dosing Weight  Height (cm): 167.6 (14 Mar 2021 12:03)  Weight (kg): 83.869 (14 Mar 2021 12:03)  BMI (kg/m2): 29.9 (14 Mar 2021 12:03)  BSA (m2): 1.93 (14 Mar 2021 12:03)    CENTRAL LINE: [ ] YES [ ] NO  LOCATION:   DATE INSERTED:  REMOVE: [ ] YES [ ] NO  EXPLAIN:    RIVERA: [ ] YES [ ] NO    DATE INSERTED:  REMOVE:  [ ] YES [ ] NO  EXPLAIN:    A-LINE:  [ ] YES [ ] NO  LOCATION:   DATE INSERTED:  REMOVE:  [ ] YES [ ] NO  EXPLAIN:    PMH -reviewed admission note, no change since admission  PAST MEDICAL & SURGICAL HISTORY:  No pertinent past medical history    S/P moody  1990s    S/P appendectomy  1990s        ICU Vital Signs Last 24 Hrs  T(C): 36.9 (20 Apr 2021 03:00), Max: 37.6 (19 Apr 2021 11:00)  T(F): 98.4 (20 Apr 2021 03:00), Max: 99.7 (19 Apr 2021 11:00)  HR: 84 (20 Apr 2021 07:00) (76 - 142)  BP: 88/69 (20 Apr 2021 07:00) (72/40 - 141/62)  BP(mean): 74 (20 Apr 2021 07:00) (47 - 86)  ABP: --  ABP(mean): --  RR: 22 (20 Apr 2021 07:00) (7 - 32)  SpO2: 96% (20 Apr 2021 07:00) (89% - 100%)      ABG - ( 20 Apr 2021 06:12 )  pH, Arterial: 7.27  pH, Blood: x     /  pCO2: 115   /  pO2: 130   / HCO3: 53    / Base Excess: 19.8  /  SaO2: NR                    04-19 @ 07:01  -  04-20 @ 07:00  --------------------------------------------------------  IN: 1989.5 mL / OUT: 2030 mL / NET: -40.5 mL        Mode: AC/ CMV (Assist Control/ Continuous Mandatory Ventilation)  RR (machine): 30  TV (machine): 320  FiO2: 60  PEEP: 8  ITime: 0.8  MAP: 17  PC: 20  PIP: 41      PHYSICAL EXAM:    GENERAL: [ ]NAD, [ ]well-groomed, [ ]well-developed  HEAD:  [ ]Atraumatic, [ ]Normocephalic  EYES: [ ]EOMI, [ ]PERRLA, [ ]conjunctiva and sclera clear  ENMT: [ ]No tonsillar erythema, exudates, or enlargement; [ ]Moist mucous membranes, [ ]Good dentition, [ ]No lesions  NECK: [ ]Supple, normal appearance, [ ]No JVD; [ ]Normal thyroid; [ ]Trachea midline  NERVOUS SYSTEM:  [ ]Alert & Oriented X3, [ ]Good concentration; [ ]Motor Strength 5/5 B/L upper and lower extremities; [ ]DTRs 2+ intact and symmetric  CHEST/LUNG: [ ]No chest deformity; [ ]Normal percussion bilaterally; [ ]No rales, rhonchi, wheezing   HEART: [ ]Regular rate and rhythm; [ ]No murmurs, rubs, or gallops  ABDOMEN: [ ]Soft, Nontender, Nondistended; [ ]Bowel sounds present  EXTREMITIES:  [ ]2+ Peripheral Pulses, [ ]No clubbing, cyanosis, or edema  LYMPH: [ ]No lymphadenopathy noted  SKIN: [ ]No rashes or lesions; [ ]Good capillary refill      LABS:  CBC Full  -  ( 20 Apr 2021 03:51 )  WBC Count : 9.61 K/uL  RBC Count : 2.72 M/uL  Hemoglobin : 8.8 g/dL  Hematocrit : 30.3 %  Platelet Count - Automated : 375 K/uL  Mean Cell Volume : 111.4 fl  Mean Cell Hemoglobin : 32.4 pg  Mean Cell Hemoglobin Concentration : 29.0 gm/dL  Auto Neutrophil # : 6.92 K/uL  Auto Lymphocyte # : 1.18 K/uL  Auto Monocyte # : 0.44 K/uL  Auto Eosinophil # : 0.05 K/uL  Auto Basophil # : 0.06 K/uL  Auto Neutrophil % : 72.0 %  Auto Lymphocyte % : 12.3 %  Auto Monocyte % : 4.6 %  Auto Eosinophil % : 0.5 %  Auto Basophil % : 0.6 %    04-20    145  |  102  |  28<H>  ----------------------------<  110<H>  5.0   |  52<HH>  |  0.46<L>    Ca    8.5      20 Apr 2021 03:50  Phos  4.5     04-20  Mg     2.4     04-20    TPro  6.3  /  Alb  2.5<L>  /  TBili  0.3  /  DBili  x   /  AST  29  /  ALT  28  /  AlkPhos  110  04-20    PT/INR - ( 19 Apr 2021 04:14 )   PT: 14.2 sec;   INR: 1.20 ratio                 RADIOLOGY & ADDITIONAL STUDIES REVIEWED:  ***    [ ]GOALS OF CARE DISCUSSION WITH PATIENT/FAMILY/PROXY:    CRITICAL CARE TIME SPENT: 35 minutes INTERVAL HPI/OVERNIGHT EVENTS: Patient received 1 dose of Epifanio for vent asynchrony,   A line placed, vent settings changed fro mPC to CVVC \    PRESSORS: [ x] YES [ ] NO  WHICH: Phenylephrine    ANTIBIOTICS:                  DATE STARTED:  ANTIBIOTICS:                  DATE STARTED:  ANTIBIOTICS:                  DATE STARTED:    Antimicrobial:    Cardiovascular:  midodrine. 10 milliGRAM(s) Oral three times a day  phenylephrine    Infusion 1.5 MICROgram(s)/kG/Min IV Continuous <Continuous>    Pulmonary:  ALBUTerol    90 MICROgram(s) HFA Inhaler 2 Puff(s) Inhalation every 6 hours    Hematalogic:  aspirin  chewable 81 milliGRAM(s) Oral daily  enoxaparin Injectable 40 milliGRAM(s) SubCutaneous daily    Other:  acetaminophen    Suspension .. 650 milliGRAM(s) Oral <User Schedule>  artificial  tears Solution 1 Drop(s) Both EYES every 4 hours PRN  ascorbic acid 1000 milliGRAM(s) Oral daily  chlorhexidine 0.12% Liquid 15 milliLiter(s) Oral Mucosa two times a day  chlorhexidine 2% Cloths 1 Application(s) Topical <User Schedule>  fentaNYL   Infusion 3.994 MICROgram(s)/kG/Hr IV Continuous <Continuous>  insulin lispro (ADMELOG) corrective regimen sliding scale   SubCutaneous every 6 hours  LORazepam     Tablet 1 milliGRAM(s) Oral every 4 hours PRN  midazolam Infusion 0.02 mG/kG/Hr IV Continuous <Continuous>  pantoprazole  Injectable 40 milliGRAM(s) IV Push daily  polyethylene glycol 3350 17 Gram(s) Oral daily  senna 2 Tablet(s) Oral at bedtime  sodium chloride 0.9% lock flush 10 milliLiter(s) IV Push every 1 hour PRN    acetaminophen    Suspension .. 650 milliGRAM(s) Oral <User Schedule>  ALBUTerol    90 MICROgram(s) HFA Inhaler 2 Puff(s) Inhalation every 6 hours  artificial  tears Solution 1 Drop(s) Both EYES every 4 hours PRN  ascorbic acid 1000 milliGRAM(s) Oral daily  aspirin  chewable 81 milliGRAM(s) Oral daily  chlorhexidine 0.12% Liquid 15 milliLiter(s) Oral Mucosa two times a day  chlorhexidine 2% Cloths 1 Application(s) Topical <User Schedule>  enoxaparin Injectable 40 milliGRAM(s) SubCutaneous daily  fentaNYL   Infusion 3.994 MICROgram(s)/kG/Hr IV Continuous <Continuous>  insulin lispro (ADMELOG) corrective regimen sliding scale   SubCutaneous every 6 hours  LORazepam     Tablet 1 milliGRAM(s) Oral every 4 hours PRN  midazolam Infusion 0.02 mG/kG/Hr IV Continuous <Continuous>  midodrine. 10 milliGRAM(s) Oral three times a day  pantoprazole  Injectable 40 milliGRAM(s) IV Push daily  phenylephrine    Infusion 1.5 MICROgram(s)/kG/Min IV Continuous <Continuous>  polyethylene glycol 3350 17 Gram(s) Oral daily  senna 2 Tablet(s) Oral at bedtime  sodium chloride 0.9% lock flush 10 milliLiter(s) IV Push every 1 hour PRN    Drug Dosing Weight  Height (cm): 167.6 (14 Mar 2021 12:03)  Weight (kg): 83.869 (14 Mar 2021 12:03)  BMI (kg/m2): 29.9 (14 Mar 2021 12:03)  BSA (m2): 1.93 (14 Mar 2021 12:03)    CENTRAL LINE: [x ] YES [ ] NO  LOCATION: [facility]   DATE INSERTED: 4/13  REMOVE: [ ] YES [ ] NO  EXPLAIN:    RIVERA: [ ] YES [ x] NO    DATE INSERTED:  REMOVE:  [ ] YES [ ] NO  EXPLAIN:    A-LINE:  [ x] YES [ ] NO  LOCATION: [facility]  DATE INSERTED: 4/20  REMOVE:  [ ] YES [ ] NO  EXPLAIN:    PMH -reviewed admission note, no change since admission  PAST MEDICAL & SURGICAL HISTORY:  No pertinent past medical history    S/P moody  1990s    S/P appendectomy  1990s        ICU Vital Signs Last 24 Hrs  T(C): 36.9 (20 Apr 2021 03:00), Max: 37.6 (19 Apr 2021 11:00)  T(F): 98.4 (20 Apr 2021 03:00), Max: 99.7 (19 Apr 2021 11:00)  HR: 84 (20 Apr 2021 07:00) (76 - 142)  BP: 88/69 (20 Apr 2021 07:00) (72/40 - 141/62)  BP(mean): 74 (20 Apr 2021 07:00) (47 - 86)  RR: 22 (20 Apr 2021 07:00) (7 - 32)  SpO2: 96% (20 Apr 2021 07:00) (89% - 100%)      ABG - ( 20 Apr 2021 06:12 )  pH, Arterial: 7.27  pH, Blood: x     /  pCO2: 115   /  pO2: 130   / HCO3: 53    / Base Excess: 19.8  /  SaO2: NR                    04-19 @ 07:01  -  04-20 @ 07:00  --------------------------------------------------------  IN: 1989.5 mL / OUT: 2030 mL / NET: -40.5 mL        Mode: AC/ CMV (Assist Control/ Continuous Mandatory Ventilation)  RR (machine): 30  TV (machine): 320  FiO2: 60  PEEP: 8  ITime: 0.8  MAP: 17  PC: 20  PIP: 41      PHYSICAL EXAM:    GENERAL: NAD, on Vent, sedated  HEAD: Normocephalic, atraumatic  EYES:  Dry eyes, conjunctivae/sclera clear, equal pupils  MOUTH : ET Tube  NECK: Supple,   LIJ, No JVD; Normal thyroid; Trachea midline no lymphadenopathy   NERVOUS SYSTEM:  sedated  CHEST/LUNG: B/L crackles (R>L), equal lung expansion L eft CXR  HEART: Regular rate and rhythm; No murmurs, rubs, or gallops, tachycardia  ABDOMEN: Soft, Nontender, Nondistended; Bowel sounds present  EXTREMITIES:  b/l upper extremities edema +2,  no edema on lower legs. No clubbing, cyanosis   SKIN: No rashes  + capillary refill <2 sec      LABS:  CBC Full  -  ( 20 Apr 2021 03:51 )  WBC Count : 9.61 K/uL  RBC Count : 2.72 M/uL  Hemoglobin : 8.8 g/dL  Hematocrit : 30.3 %  Platelet Count - Automated : 375 K/uL  Mean Cell Volume : 111.4 fl  Mean Cell Hemoglobin : 32.4 pg  Mean Cell Hemoglobin Concentration : 29.0 gm/dL  Auto Neutrophil # : 6.92 K/uL  Auto Lymphocyte # : 1.18 K/uL  Auto Monocyte # : 0.44 K/uL  Auto Eosinophil # : 0.05 K/uL  Auto Basophil # : 0.06 K/uL  Auto Neutrophil % : 72.0 %  Auto Lymphocyte % : 12.3 %  Auto Monocyte % : 4.6 %  Auto Eosinophil % : 0.5 %  Auto Basophil % : 0.6 %    04-20    145  |  102  |  28<H>  ----------------------------<  110<H>  5.0   |  52<HH>  |  0.46<L>    Ca    8.5      20 Apr 2021 03:50  Phos  4.5     04-20  Mg     2.4     04-20    TPro  6.3  /  Alb  2.5<L>  /  TBili  0.3  /  DBili  x   /  AST  29  /  ALT  28  /  AlkPhos  110  04-20    PT/INR - ( 19 Apr 2021 04:14 )   PT: 14.2 sec;   INR: 1.20 ratio                 RADIOLOGY & ADDITIONAL STUDIES REVIEWED:  yes    [ ]GOALS OF CARE DISCUSSION WITH PATIENT/FAMILY/PROXY:    CRITICAL CARE TIME SPENT: 35 minutes

## 2021-04-20 NOTE — PROVIDER CONTACT NOTE (CHANGE IN STATUS NOTIFICATION) - ACTION/TREATMENT ORDERED:
MD Saha at bedside assessing patient. Rocuronium 50mg given by MD. Precedex gtt added. Continue to monitor. MD aware.
Stop precedex gtt. Continue to monitor.

## 2021-04-21 ENCOUNTER — APPOINTMENT (OUTPATIENT)
Dept: THORACIC SURGERY | Facility: HOSPITAL | Age: 56
End: 2021-04-21

## 2021-04-21 LAB
ALBUMIN SERPL ELPH-MCNC: 2.4 G/DL — LOW (ref 3.5–5)
ALP SERPL-CCNC: 100 U/L — SIGNIFICANT CHANGE UP (ref 40–120)
ALT FLD-CCNC: 23 U/L DA — SIGNIFICANT CHANGE UP (ref 10–60)
ANION GAP SERPL CALC-SCNC: <3 MMOL/L — LOW (ref 5–17)
ANION GAP SERPL CALC-SCNC: <3 MMOL/L — LOW (ref 5–17)
ANISOCYTOSIS BLD QL: SLIGHT — SIGNIFICANT CHANGE UP
APTT BLD: 33.2 SEC — SIGNIFICANT CHANGE UP (ref 27.5–35.5)
AST SERPL-CCNC: 25 U/L — SIGNIFICANT CHANGE UP (ref 10–40)
BASE EXCESS BLDA CALC-SCNC: 26.6 MMOL/L — HIGH (ref -2–3)
BASE EXCESS BLDA CALC-SCNC: 27 MMOL/L — HIGH (ref -2–3)
BASE EXCESS BLDA CALC-SCNC: 31 MMOL/L — HIGH (ref -2–3)
BASOPHILS # BLD AUTO: 0 K/UL — SIGNIFICANT CHANGE UP (ref 0–0.2)
BASOPHILS NFR BLD AUTO: 0 % — SIGNIFICANT CHANGE UP (ref 0–2)
BILIRUB SERPL-MCNC: 0.3 MG/DL — SIGNIFICANT CHANGE UP (ref 0.2–1.2)
BLOOD GAS COMMENTS ARTERIAL: SIGNIFICANT CHANGE UP
BUN SERPL-MCNC: 20 MG/DL — HIGH (ref 7–18)
BUN SERPL-MCNC: 23 MG/DL — HIGH (ref 7–18)
CALCIUM SERPL-MCNC: 8.1 MG/DL — LOW (ref 8.4–10.5)
CALCIUM SERPL-MCNC: 8.7 MG/DL — SIGNIFICANT CHANGE UP (ref 8.4–10.5)
CHLORIDE SERPL-SCNC: 102 MMOL/L — SIGNIFICANT CHANGE UP (ref 96–108)
CHLORIDE SERPL-SCNC: 102 MMOL/L — SIGNIFICANT CHANGE UP (ref 96–108)
CO2 SERPL-SCNC: >45 MMOL/L — CRITICAL HIGH (ref 22–31)
CO2 SERPL-SCNC: >45 MMOL/L — CRITICAL HIGH (ref 22–31)
CREAT SERPL-MCNC: 0.29 MG/DL — LOW (ref 0.5–1.3)
CREAT SERPL-MCNC: 0.29 MG/DL — LOW (ref 0.5–1.3)
D DIMER BLD IA.RAPID-MCNC: 2160 NG/ML DDU — HIGH
EOSINOPHIL # BLD AUTO: 0.08 K/UL — SIGNIFICANT CHANGE UP (ref 0–0.5)
EOSINOPHIL NFR BLD AUTO: 1 % — SIGNIFICANT CHANGE UP (ref 0–6)
GLUCOSE BLDC GLUCOMTR-MCNC: 111 MG/DL — HIGH (ref 70–99)
GLUCOSE BLDC GLUCOMTR-MCNC: 113 MG/DL — HIGH (ref 70–99)
GLUCOSE BLDC GLUCOMTR-MCNC: 91 MG/DL — SIGNIFICANT CHANGE UP (ref 70–99)
GLUCOSE BLDC GLUCOMTR-MCNC: 94 MG/DL — SIGNIFICANT CHANGE UP (ref 70–99)
GLUCOSE SERPL-MCNC: 115 MG/DL — HIGH (ref 70–99)
GLUCOSE SERPL-MCNC: 90 MG/DL — SIGNIFICANT CHANGE UP (ref 70–99)
HCO3 BLDA-SCNC: 60 MMOL/L — CRITICAL HIGH (ref 21–28)
HCO3 BLDA-SCNC: 61 MMOL/L — CRITICAL HIGH (ref 21–28)
HCO3 BLDA-SCNC: 62 MMOL/L — CRITICAL HIGH (ref 21–28)
HCT VFR BLD CALC: 28.3 % — LOW (ref 39–50)
HGB BLD-MCNC: 8.2 G/DL — LOW (ref 13–17)
HOROWITZ INDEX BLDA+IHG-RTO: 60 — SIGNIFICANT CHANGE UP
HOROWITZ INDEX BLDA+IHG-RTO: 65 — SIGNIFICANT CHANGE UP
HOROWITZ INDEX BLDA+IHG-RTO: 65 — SIGNIFICANT CHANGE UP
INR BLD: 1.16 RATIO — SIGNIFICANT CHANGE UP (ref 0.88–1.16)
LYMPHOCYTES # BLD AUTO: 0.68 K/UL — LOW (ref 1–3.3)
LYMPHOCYTES # BLD AUTO: 8 % — LOW (ref 13–44)
MAGNESIUM SERPL-MCNC: 2.2 MG/DL — SIGNIFICANT CHANGE UP (ref 1.6–2.6)
MANUAL SMEAR VERIFICATION: SIGNIFICANT CHANGE UP
MCHC RBC-ENTMCNC: 29 GM/DL — LOW (ref 32–36)
MCHC RBC-ENTMCNC: 32.7 PG — SIGNIFICANT CHANGE UP (ref 27–34)
MCV RBC AUTO: 112.7 FL — HIGH (ref 80–100)
MONOCYTES # BLD AUTO: 0.17 K/UL — SIGNIFICANT CHANGE UP (ref 0–0.9)
MONOCYTES NFR BLD AUTO: 2 % — SIGNIFICANT CHANGE UP (ref 2–14)
MYELOCYTES NFR BLD: 3 % — HIGH (ref 0–0)
NEUTROPHILS # BLD AUTO: 7.26 K/UL — SIGNIFICANT CHANGE UP (ref 1.8–7.4)
NEUTROPHILS NFR BLD AUTO: 85 % — HIGH (ref 43–77)
NEUTS BAND # BLD: 1 % — SIGNIFICANT CHANGE UP (ref 0–8)
NRBC # BLD: 0 /100 — SIGNIFICANT CHANGE UP (ref 0–0)
PCO2 BLDA: 116 MMHG — CRITICAL HIGH (ref 35–48)
PCO2 BLDA: 122 MMHG — CRITICAL HIGH (ref 35–48)
PCO2 BLDA: 138 MMHG — CRITICAL HIGH (ref 35–48)
PH BLDA: 7.26 — LOW (ref 7.35–7.45)
PH BLDA: 7.31 — LOW (ref 7.35–7.45)
PH BLDA: 7.32 — LOW (ref 7.35–7.45)
PHOSPHATE SERPL-MCNC: 1.8 MG/DL — LOW (ref 2.5–4.5)
PLAT MORPH BLD: NORMAL — SIGNIFICANT CHANGE UP
PLATELET # BLD AUTO: 369 K/UL — SIGNIFICANT CHANGE UP (ref 150–400)
PLATELET COUNT - ESTIMATE: NORMAL — SIGNIFICANT CHANGE UP
PO2 BLDA: 71 MMHG — LOW (ref 83–108)
PO2 BLDA: 84 MMHG — SIGNIFICANT CHANGE UP (ref 83–108)
PO2 BLDA: 89 MMHG — SIGNIFICANT CHANGE UP (ref 83–108)
POIKILOCYTOSIS BLD QL AUTO: SLIGHT — SIGNIFICANT CHANGE UP
POLYCHROMASIA BLD QL SMEAR: SLIGHT — SIGNIFICANT CHANGE UP
POTASSIUM SERPL-MCNC: 3.8 MMOL/L — SIGNIFICANT CHANGE UP (ref 3.5–5.3)
POTASSIUM SERPL-MCNC: 4.3 MMOL/L — SIGNIFICANT CHANGE UP (ref 3.5–5.3)
POTASSIUM SERPL-SCNC: 3.8 MMOL/L — SIGNIFICANT CHANGE UP (ref 3.5–5.3)
POTASSIUM SERPL-SCNC: 4.3 MMOL/L — SIGNIFICANT CHANGE UP (ref 3.5–5.3)
PROT SERPL-MCNC: 6.3 G/DL — SIGNIFICANT CHANGE UP (ref 6–8.3)
PROTHROM AB SERPL-ACNC: 13.7 SEC — HIGH (ref 10.6–13.6)
RBC # BLD: 2.51 M/UL — LOW (ref 4.2–5.8)
RBC # FLD: 14.6 % — HIGH (ref 10.3–14.5)
RBC BLD AUTO: ABNORMAL
SAO2 % BLDA: 98 % — SIGNIFICANT CHANGE UP
SAO2 % BLDA: 98 % — SIGNIFICANT CHANGE UP
SAO2 % BLDA: SIGNIFICANT CHANGE UP %
SODIUM SERPL-SCNC: 150 MMOL/L — HIGH (ref 135–145)
SODIUM SERPL-SCNC: 150 MMOL/L — HIGH (ref 135–145)
TARGETS BLD QL SMEAR: SLIGHT — SIGNIFICANT CHANGE UP
WBC # BLD: 8.44 K/UL — SIGNIFICANT CHANGE UP (ref 3.8–10.5)
WBC # FLD AUTO: 8.44 K/UL — SIGNIFICANT CHANGE UP (ref 3.8–10.5)

## 2021-04-21 PROCEDURE — 71045 X-RAY EXAM CHEST 1 VIEW: CPT | Mod: 26,76

## 2021-04-21 PROCEDURE — 43235 EGD DIAGNOSTIC BRUSH WASH: CPT

## 2021-04-21 PROCEDURE — 31600 PLANNED TRACHEOSTOMY: CPT

## 2021-04-21 RX ORDER — ROCURONIUM BROMIDE 10 MG/ML
50 VIAL (ML) INTRAVENOUS ONCE
Refills: 0 | Status: COMPLETED | OUTPATIENT
Start: 2021-04-21 | End: 2021-04-21

## 2021-04-21 RX ORDER — FENTANYL CITRATE 50 UG/ML
50 INJECTION INTRAVENOUS ONCE
Refills: 0 | Status: DISCONTINUED | OUTPATIENT
Start: 2021-04-21 | End: 2021-04-21

## 2021-04-21 RX ORDER — FUROSEMIDE 40 MG
40 TABLET ORAL ONCE
Refills: 0 | Status: COMPLETED | OUTPATIENT
Start: 2021-04-21 | End: 2021-04-21

## 2021-04-21 RX ORDER — ENOXAPARIN SODIUM 100 MG/ML
40 INJECTION SUBCUTANEOUS ONCE
Refills: 0 | Status: COMPLETED | OUTPATIENT
Start: 2021-04-21 | End: 2021-04-21

## 2021-04-21 RX ORDER — FENTANYL CITRATE 50 UG/ML
5 INJECTION INTRAVENOUS
Qty: 2500 | Refills: 0 | Status: DISCONTINUED | OUTPATIENT
Start: 2021-04-21 | End: 2021-04-23

## 2021-04-21 RX ORDER — ACETAZOLAMIDE 250 MG/1
500 TABLET ORAL ONCE
Refills: 0 | Status: COMPLETED | OUTPATIENT
Start: 2021-04-21 | End: 2021-04-21

## 2021-04-21 RX ORDER — MIDODRINE HYDROCHLORIDE 2.5 MG/1
5 TABLET ORAL THREE TIMES A DAY
Refills: 0 | Status: DISCONTINUED | OUTPATIENT
Start: 2021-04-21 | End: 2021-04-22

## 2021-04-21 RX ORDER — METOPROLOL TARTRATE 50 MG
5 TABLET ORAL ONCE
Refills: 0 | Status: COMPLETED | OUTPATIENT
Start: 2021-04-21 | End: 2021-04-21

## 2021-04-21 RX ORDER — PROPOFOL 10 MG/ML
5 INJECTION, EMULSION INTRAVENOUS ONCE
Refills: 0 | Status: DISCONTINUED | OUTPATIENT
Start: 2021-04-21 | End: 2021-04-21

## 2021-04-21 RX ORDER — POTASSIUM CHLORIDE 20 MEQ
40 PACKET (EA) ORAL ONCE
Refills: 0 | Status: DISCONTINUED | OUTPATIENT
Start: 2021-04-21 | End: 2021-04-21

## 2021-04-21 RX ORDER — PROPOFOL 10 MG/ML
10 INJECTION, EMULSION INTRAVENOUS
Qty: 1000 | Refills: 0 | Status: DISCONTINUED | OUTPATIENT
Start: 2021-04-21 | End: 2021-04-23

## 2021-04-21 RX ORDER — PROPOFOL 10 MG/ML
10 INJECTION, EMULSION INTRAVENOUS
Qty: 1000 | Refills: 0 | Status: DISCONTINUED | OUTPATIENT
Start: 2021-04-21 | End: 2021-04-21

## 2021-04-21 RX ORDER — POTASSIUM PHOSPHATE, MONOBASIC POTASSIUM PHOSPHATE, DIBASIC 236; 224 MG/ML; MG/ML
30 INJECTION, SOLUTION INTRAVENOUS ONCE
Refills: 0 | Status: COMPLETED | OUTPATIENT
Start: 2021-04-21 | End: 2021-04-21

## 2021-04-21 RX ORDER — HYDROCHLOROTHIAZIDE 25 MG
25 TABLET ORAL ONCE
Refills: 0 | Status: COMPLETED | OUTPATIENT
Start: 2021-04-21 | End: 2021-04-21

## 2021-04-21 RX ADMIN — Medication 40 MILLIGRAM(S): at 05:21

## 2021-04-21 RX ADMIN — PANTOPRAZOLE SODIUM 40 MILLIGRAM(S): 20 TABLET, DELAYED RELEASE ORAL at 11:19

## 2021-04-21 RX ADMIN — MIDODRINE HYDROCHLORIDE 5 MILLIGRAM(S): 2.5 TABLET ORAL at 20:04

## 2021-04-21 RX ADMIN — FENTANYL CITRATE 41.9 MICROGRAM(S)/KG/HR: 50 INJECTION INTRAVENOUS at 07:45

## 2021-04-21 RX ADMIN — FENTANYL CITRATE 50 MICROGRAM(S): 50 INJECTION INTRAVENOUS at 08:58

## 2021-04-21 RX ADMIN — FENTANYL CITRATE 50 MICROGRAM(S): 50 INJECTION INTRAVENOUS at 08:15

## 2021-04-21 RX ADMIN — FENTANYL CITRATE 41.9 MICROGRAM(S)/KG/HR: 50 INJECTION INTRAVENOUS at 04:47

## 2021-04-21 RX ADMIN — CHLORHEXIDINE GLUCONATE 1 APPLICATION(S): 213 SOLUTION TOPICAL at 05:05

## 2021-04-21 RX ADMIN — Medication 50 MILLIGRAM(S): at 03:18

## 2021-04-21 RX ADMIN — CHLORHEXIDINE GLUCONATE 15 MILLILITER(S): 213 SOLUTION TOPICAL at 17:24

## 2021-04-21 RX ADMIN — PROPOFOL 5.03 MICROGRAM(S)/KG/MIN: 10 INJECTION, EMULSION INTRAVENOUS at 13:06

## 2021-04-21 RX ADMIN — PROPOFOL 5.03 MICROGRAM(S)/KG/MIN: 10 INJECTION, EMULSION INTRAVENOUS at 18:51

## 2021-04-21 RX ADMIN — FENTANYL CITRATE 33.5 MICROGRAM(S)/KG/HR: 50 INJECTION INTRAVENOUS at 01:25

## 2021-04-21 RX ADMIN — CHLORHEXIDINE GLUCONATE 15 MILLILITER(S): 213 SOLUTION TOPICAL at 05:05

## 2021-04-21 RX ADMIN — ENOXAPARIN SODIUM 40 MILLIGRAM(S): 100 INJECTION SUBCUTANEOUS at 17:15

## 2021-04-21 RX ADMIN — MIDAZOLAM HYDROCHLORIDE 1.68 MG/KG/HR: 1 INJECTION, SOLUTION INTRAMUSCULAR; INTRAVENOUS at 13:05

## 2021-04-21 RX ADMIN — Medication 5 MILLIGRAM(S): at 03:40

## 2021-04-21 RX ADMIN — CHLORHEXIDINE GLUCONATE 1 APPLICATION(S): 213 SOLUTION TOPICAL at 11:20

## 2021-04-21 RX ADMIN — FENTANYL CITRATE 41.9 MICROGRAM(S)/KG/HR: 50 INJECTION INTRAVENOUS at 19:16

## 2021-04-21 RX ADMIN — POTASSIUM PHOSPHATE, MONOBASIC POTASSIUM PHOSPHATE, DIBASIC 83.33 MILLIMOLE(S): 236; 224 INJECTION, SOLUTION INTRAVENOUS at 05:05

## 2021-04-21 RX ADMIN — ACETAZOLAMIDE 110 MILLIGRAM(S): 250 TABLET ORAL at 20:05

## 2021-04-21 RX ADMIN — Medication 5 MILLIGRAM(S): at 11:20

## 2021-04-21 RX ADMIN — FENTANYL CITRATE 41.9 MICROGRAM(S)/KG/HR: 50 INJECTION INTRAVENOUS at 13:05

## 2021-04-21 RX ADMIN — Medication 0: at 11:03

## 2021-04-21 RX ADMIN — PROPOFOL 5.03 MICROGRAM(S)/KG/MIN: 10 INJECTION, EMULSION INTRAVENOUS at 10:46

## 2021-04-21 RX ADMIN — Medication 25 MILLIGRAM(S): at 20:04

## 2021-04-21 RX ADMIN — Medication 0: at 17:17

## 2021-04-21 RX ADMIN — MIDAZOLAM HYDROCHLORIDE 1.68 MG/KG/HR: 1 INJECTION, SOLUTION INTRAMUSCULAR; INTRAVENOUS at 07:44

## 2021-04-21 NOTE — PROGRESS NOTE ADULT - ASSESSMENT
Assessment and Recommendation:   Problem/Recommendation - 1:  Problem: COVID-19. Recommendation: isolation precautions  Cont mechanical ventilation  Taper FIO2 as tolerated  Wean as tolerated  Pulmonary toilet  Supplemental nutrition  ICU management.   Monitor oxygen sat  Monitor LFT, LDH, CRP, D-Dimer, Ferritin and procalcitonin  Vit C, D and zinc supp  Montelukast 10 mgs po Qhs  Daily CXR   DVT and GI PPX   Planning for trach/peg     Problem/Recommendation - 2:  ·  Problem: UTI (urinary tract infection).  Recommendation: F.U cultures   Off Antibiotics.     Problem/Recommendation - 3:  ·  Problem: Chest pain.  Recommendation: likely related to Covid-19 infection.     Problem/Recommendation - 4:  ·  Problem: Transaminitis.  Recommendation: monitor LFTs  GI F/U     Problem/Recommendation - 5:  ·  Problem: Pneumothorax.  Recommendation: L pigtail   Thoracic sx follow up  Daily  CXR   Management per ICU

## 2021-04-21 NOTE — CONSULT NOTE ADULT - SUBJECTIVE AND OBJECTIVE BOX
73 y.o. M with COVID dx 3/23/2021 had trach/PEG scheduled for today. While tracheostomy successful, thoracic team unable to place gastrostomy tube percutaneously. Will plan for open gastrostomy tube 4/23/2021. May resume hep gtt per thoracic team. Hold 6h pre-op from 4/23/2021. con't medical optimization. 73 y.o. M with COVID dx 3/23/2021 had trach/PEG scheduled for today. While tracheostomy successful, thoracic team unable to place gastrostomy tube percutaneously. Will plan for open gastrostomy tube 4/23/2021. con't medical optimization.

## 2021-04-21 NOTE — PROGRESS NOTE ADULT - SUBJECTIVE AND OBJECTIVE BOX
Patient is a 55y old  Male who presents with a chief complaint of SOB (20 Apr 2021 13:37)    pt seen in icu [ x ], reg med floor [   ], bed [ x ], chair at bedside [   ], a+o x3 [  ], sedated [x  ],  nad [x  ]    andrea [ x ], ngt feed [x  ], et tube [ x ], cent line [x  ],        Allergies    No Known Allergies        Vitals    T(F): 99.2 (04-21-21 @ 04:00), Max: 100.3 (04-20-21 @ 13:00)  HR: 104 (04-21-21 @ 06:15) (77 - 147)  BP: 129/57 (04-20-21 @ 12:00) (61/50 - 129/57)  RR: 30 (04-21-21 @ 06:15) (14 - 31)  SpO2: 97% (04-21-21 @ 06:15) (90% - 100%)  Wt(kg): --  CAPILLARY BLOOD GLUCOSE      POCT Blood Glucose.: 113 mg/dL (21 Apr 2021 05:07)      Labs                          8.2    8.44  )-----------( 369      ( 21 Apr 2021 03:58 )             28.3       04-21    150<H>  |  102  |  23<H>  ----------------------------<  115<H>  4.3   |  >45<HH>  |  0.29<L>    Ca    8.7      21 Apr 2021 03:58  Phos  1.8     04-21  Mg     2.2     04-21    TPro  6.3  /  Alb  2.4<L>  /  TBili  0.3  /  DBili  x   /  AST  25  /  ALT  23  /  AlkPhos  100  04-21            .Sputum Sputum  04-16 @ 04:42   Moderate Enterobacter aerogenes (Carbapenem Resistant)  Normal Respiratory Monserrat present  --  Enterobacter aerogenes (Carbapenem Resistant)      .Urine Clean Catch (Midstream)  03-15 @ 00:52   No growth  --  --          Radiology Results      Meds    MEDICATIONS  (STANDING):  ascorbic acid 1000 milliGRAM(s) Oral daily  aspirin  chewable 81 milliGRAM(s) Oral daily  chlorhexidine 0.12% Liquid 15 milliLiter(s) Oral Mucosa two times a day  chlorhexidine 2% Cloths 1 Application(s) Topical <User Schedule>  chlorhexidine 2% Cloths 1 Application(s) Topical daily  fentaNYL   Infusion 5 MICROgram(s)/kG/Hr (41.9 mL/Hr) IV Continuous <Continuous>  insulin lispro (ADMELOG) corrective regimen sliding scale   SubCutaneous every 6 hours  midazolam Infusion 0.02 mG/kG/Hr (1.68 mL/Hr) IV Continuous <Continuous>  midodrine. 10 milliGRAM(s) Oral three times a day  pantoprazole  Injectable 40 milliGRAM(s) IV Push daily  phenylephrine    Infusion 1.5 MICROgram(s)/kG/Min (23.6 mL/Hr) IV Continuous <Continuous>  polyethylene glycol 3350 17 Gram(s) Oral daily  senna 2 Tablet(s) Oral at bedtime      MEDICATIONS  (PRN):  acetaminophen    Suspension .. 650 milliGRAM(s) Oral every 6 hours PRN Temp greater or equal to 38C (100.4F)  ALBUTerol    90 MICROgram(s) HFA Inhaler 2 Puff(s) Inhalation every 6 hours PRN Shortness of Breath and/or Wheezing  artificial  tears Solution 1 Drop(s) Both EYES every 4 hours PRN Dry Eyes  sodium chloride 0.9% lock flush 10 milliLiter(s) IV Push every 1 hour PRN Pre/post blood products, medications, blood draw, and to maintain line patency      Physical Exam    Neuro :  no focal deficits  Respiratory: CTA B/L  CV: RRR, S1S2, no murmurs,   Abdominal: Soft, NT, ND +BS,  Extremities: No edema, + peripheral pulses      ASSESSMENT    Hypoxemia 2nd to covid pna   transaminitis  prediabetes  h/o appendectomy  cholecystectomy        PLAN    contact and airborne isolation  d/c remdesevir given covid ab positive noted   completed dexamethasone   started pulse steroids for 3 days - 250mg solumedrol bid now tapered off 4/14/21  cont asa, vit c,    cont albuterol inhaler   pulm f/u  procalcitonin, D-dimer, crp, ldh, ferritin, lactate noted ,    tmx 99.7  cont tylenol prn,   cont robitussin prn   pt on fentanyl, phenylephrine, midazolam drip  s/p intubation 3/29/21  O2 sat (89% - 100%) mech vent 60%  O2 via mech vent and taper fio2 as tolerated   vent mgmt as per icu  xray 3/19/21 with pneumomediastinum  rept cxr with New trace right apical pneumothorax. New mild left apical pneumothorax. Grossly stable small pneumomediastinum.  Soft tissue emphysema at the neck bases bilaterally. Grossly stable bilateral pulmonary infiltrates noted.   cxr 2/24 with No evidence of pneumothorax can be appreciated on the available image. This may be related to patient positioning. Evidence of pneumomediastinum and subcutaneous emphysema in the lower neck is again noted. There are patchy bibasilar infiltrates and elevated right hemidiaphragm noted.   cxr 3/29/21 with No significant change bilateral infiltrates. There is a small simple left apical pneumothorax. No significant pleural effusion. Bilateral subcutaneous emphysema similar to prior.   pt intubated 6AM 3/29/21,   XRs on 7AM and 5PM with no obvious increase of ptx  cxr 4/4/21 with Improving bilateral airspace disease noted    cxr 4/8/21 with Small left pneumothorax noted.  thoracic surg f/u   s/p L chest tube replacement 4/18/21 for recurrence of left pneumothorax   cxr 4/20/21 with Unchanged advanced infiltrates and catheter left chest tube noted above.  CXR 4/11/21 with : No interval change compared to one day prior. No pneumothorax noted.   Daily CXR  Monitor O2 status   pt for possible trach today  id f/u   Continue Meropenem 1g iv q8  f/u blood cx   speutum cx with Enterobacter aerogenes (Carbapenem Resistant) noted above  andrea   lispro ss   prognosis poor  cont current meds  mgmt as per icu          Patient is a 55y old  Male who presents with a chief complaint of SOB (20 Apr 2021 13:37)    pt seen in icu [ x ], reg med floor [   ], bed [ x ], chair at bedside [   ], a+o x3 [  ], sedated [x  ],  nad [x  ]    andrea [ x ], ngt feed [x  ], et tube [ x ], cent line [x  ],        Allergies    No Known Allergies        Vitals    T(F): 99.2 (04-21-21 @ 04:00), Max: 100.3 (04-20-21 @ 13:00)  HR: 104 (04-21-21 @ 06:15) (77 - 147)  BP: 129/57 (04-20-21 @ 12:00) (61/50 - 129/57)  RR: 30 (04-21-21 @ 06:15) (14 - 31)  SpO2: 97% (04-21-21 @ 06:15) (90% - 100%)  Wt(kg): --  CAPILLARY BLOOD GLUCOSE      POCT Blood Glucose.: 113 mg/dL (21 Apr 2021 05:07)      Labs                          8.2    8.44  )-----------( 369      ( 21 Apr 2021 03:58 )             28.3       04-21    150<H>  |  102  |  23<H>  ----------------------------<  115<H>  4.3   |  >45<HH>  |  0.29<L>    Ca    8.7      21 Apr 2021 03:58  Phos  1.8     04-21  Mg     2.2     04-21    TPro  6.3  /  Alb  2.4<L>  /  TBili  0.3  /  DBili  x   /  AST  25  /  ALT  23  /  AlkPhos  100  04-21            .Sputum Sputum  04-16 @ 04:42   Moderate Enterobacter aerogenes (Carbapenem Resistant)  Normal Respiratory Monserrat present  --  Enterobacter aerogenes (Carbapenem Resistant)      .Urine Clean Catch (Midstream)  03-15 @ 00:52   No growth  --  --          Radiology Results      Meds    MEDICATIONS  (STANDING):  ascorbic acid 1000 milliGRAM(s) Oral daily  aspirin  chewable 81 milliGRAM(s) Oral daily  chlorhexidine 0.12% Liquid 15 milliLiter(s) Oral Mucosa two times a day  chlorhexidine 2% Cloths 1 Application(s) Topical <User Schedule>  chlorhexidine 2% Cloths 1 Application(s) Topical daily  fentaNYL   Infusion 5 MICROgram(s)/kG/Hr (41.9 mL/Hr) IV Continuous <Continuous>  insulin lispro (ADMELOG) corrective regimen sliding scale   SubCutaneous every 6 hours  midazolam Infusion 0.02 mG/kG/Hr (1.68 mL/Hr) IV Continuous <Continuous>  midodrine. 10 milliGRAM(s) Oral three times a day  pantoprazole  Injectable 40 milliGRAM(s) IV Push daily  phenylephrine    Infusion 1.5 MICROgram(s)/kG/Min (23.6 mL/Hr) IV Continuous <Continuous>  polyethylene glycol 3350 17 Gram(s) Oral daily  senna 2 Tablet(s) Oral at bedtime      MEDICATIONS  (PRN):  acetaminophen    Suspension .. 650 milliGRAM(s) Oral every 6 hours PRN Temp greater or equal to 38C (100.4F)  ALBUTerol    90 MICROgram(s) HFA Inhaler 2 Puff(s) Inhalation every 6 hours PRN Shortness of Breath and/or Wheezing  artificial  tears Solution 1 Drop(s) Both EYES every 4 hours PRN Dry Eyes  sodium chloride 0.9% lock flush 10 milliLiter(s) IV Push every 1 hour PRN Pre/post blood products, medications, blood draw, and to maintain line patency      Physical Exam    Neuro :  no focal deficits  Respiratory: CTA B/L  CV: RRR, S1S2, no murmurs,   Abdominal: Soft, NT, ND +BS,  Extremities: No edema, + peripheral pulses      ASSESSMENT    Hypoxemia 2nd to covid pna   transaminitis  prediabetes  h/o appendectomy  cholecystectomy        PLAN    contact and airborne isolation  d/c remdesevir given covid ab positive noted   completed dexamethasone   started pulse steroids for 3 days - 250mg solumedrol bid now tapered off 4/14/21  cont asa, vit c,    cont albuterol inhaler   pulm f/u  procalcitonin, D-dimer, crp, ldh, ferritin, lactate noted ,    tmx 100.3  cont tylenol prn,   cont robitussin prn   pt on fentanyl, phenylephrine, midazolam drip  s/p intubation 3/29/21  O2 sat (90% - 100%) mech vent 60%  O2 via mech vent and taper fio2 as tolerated   vent mgmt as per icu  xray 3/19/21 with pneumomediastinum  rept cxr with New trace right apical pneumothorax. New mild left apical pneumothorax. Grossly stable small pneumomediastinum.  Soft tissue emphysema at the neck bases bilaterally. Grossly stable bilateral pulmonary infiltrates noted.   cxr 2/24 with No evidence of pneumothorax can be appreciated on the available image. This may be related to patient positioning. Evidence of pneumomediastinum and subcutaneous emphysema in the lower neck is again noted. There are patchy bibasilar infiltrates and elevated right hemidiaphragm noted.   cxr 3/29/21 with No significant change bilateral infiltrates. There is a small simple left apical pneumothorax. No significant pleural effusion. Bilateral subcutaneous emphysema similar to prior.   pt intubated 6AM 3/29/21,   XRs on 7AM and 5PM with no obvious increase of ptx  cxr 4/4/21 with Improving bilateral airspace disease noted    cxr 4/8/21 with Small left pneumothorax noted.  thoracic surg f/u   s/p L chest tube replacement 4/18/21 for recurrence of left pneumothorax   cxr 4/20/21 with Unchanged advanced infiltrates and catheter left chest tube noted   CXR 4/11/21 with : No interval change compared to one day prior. No pneumothorax noted.   Daily CXR  Monitor O2 status   pt for trach and peg today  id f/u   No need for further antibiotics as patient completed course of Meropenem  leukocytosis resolved  speutum cx with Enterobacter aerogenes (Carbapenem Resistant) noted above  andrea   lispro ss   prognosis poor  cont current meds  mgmt as per icu

## 2021-04-21 NOTE — CHART NOTE - NSCHARTNOTEFT_GEN_A_CORE
Spoke with patient's brother Brennan and gave him the daily update via  ID [id number]. All questions were answered

## 2021-04-21 NOTE — CHART NOTE - NSCHARTNOTEFT_GEN_A_CORE
Sx postop check    55M POD0 tracheostomy for acute respiratory failure s/p covid pna    Patient seen at bedside, no acute events. Saturation 94% on FiO2 65, Peak in 30s. Cxray confirms trach in appropriate position. Continues tachy in 110s. BP systolic in 90s on a line. Trach site without significant bleeding or swelling around airway    Vital Signs Last 24 Hrs  T(C): 36.1 (21 Apr 2021 16:00), Max: 37.6 (21 Apr 2021 00:00)  T(F): 97 (21 Apr 2021 16:00), Max: 99.7 (21 Apr 2021 00:00)  HR: 114 (21 Apr 2021 19:00) (93 - 146)  BP: 122/67 (21 Apr 2021 13:36) (122/67 - 122/67)  BP(mean): --  RR: 24 (21 Apr 2021 19:00) (19 - 31)  SpO2: 95% (21 Apr 2021 19:00) (91% - 98%)  Gen: sedate, trach in place  HEENT: trach in place, no significant bleeding or swelling  Resp: nonlabored, on vent  5 30 65  Abd: nd nt  Neuro: sedate                          8.2    8.44  )-----------( 369      ( 21 Apr 2021 03:58 )             28.3     04-21    150<H>  |  102  |  20<H>  ----------------------------<  90  3.8   |  >45<HH>  |  0.29<L>    Ca    8.1<L>      21 Apr 2021 16:09  Phos  1.8     04-21  Mg     2.2     04-21    TPro  6.3  /  Alb  2.4<L>  /  TBili  0.3  /  DBili  x   /  AST  25  /  ALT  23  /  AlkPhos  100  04-21    A/P: 55M s/p covid POD0 trach, no acute distress  - continue monitor resp status  - surgery to follow

## 2021-04-21 NOTE — CONSULT NOTE ADULT - ATTENDING COMMENTS
Agree with above, I have seen and examined the patient.
I agree with above
IMP: This is a 55 yr old  man , non smoker with  moody 1990s presented 3/14 with x9 days worsening cough, subjective fevers, and SOB, with x2-3 days dysuria and central, non-radiating, constant CP. Admitted to medicine unit  for acute hypoxic respiratory failure secondary to pna from covid-19 infection .  ICU consulted for increasing work of breathing on 15lpm NRM.     -  Acute hypoxic respiratory failure  -  Pneumonia  -  Covid-19 infection   -  Transaminitis  -  Prediabetes  -  Abnormal TSH      Plan   -transfer to icu   -isolation : contact and air borne   -HFNC or BiPaP as needed to maintain O2 sat>90%  -monitor biomarkers dailuy  -dvt/gi prophy  -continue decadron 6 mg /day x 10 days total then taper  -not a candidate for remdesivir dut to covid-19 antibodies and elevated LFT  -not a candidate for Tociluzimab due to elevated LFT  -hemodynamic monitoring   -self prone as tolerated   -low threshold for intubation

## 2021-04-21 NOTE — CHART NOTE - NSCHARTNOTEFT_GEN_A_CORE
Telephone encounter -   Writer spoke with son and graunddaughter Brennan Lau and Isabel Lau on the tracheostomy and explained that the PEG tube was not performed as there was not a clear window. Spoke at [phone]

## 2021-04-21 NOTE — PROGRESS NOTE ADULT - ASSESSMENT
[facility] Assessment and Recommendation:   Problem/Recommendation - 1:  Problem: COVID-19. Recommendation: isolation precautions  Cont mechanical ventilation  Taper FIO2 as tolerated  Wean as tolerated  Pulmonary toilet  Supplemental nutrition  ICU management.   Monitor oxygen sat  Monitor LFT, LDH, CRP, D-Dimer, Ferritin and procalcitonin  Vit C, D and zinc supp  Montelukast 10 mgs po Qhs  Daily CXR   DVT and GI PPX   May require trach and Peg     Problem/Recommendation - 2:  ·  Problem: UTI (urinary tract infection).  Recommendation: F.U cultures   Off Antibiotics.     Problem/Recommendation - 3:  ·  Problem: Chest pain.  Recommendation: likely related to Covid-19 infection.     Problem/Recommendation - 4:  ·  Problem: Transaminitis.  Recommendation: monitor LFTs  GI F/U     Problem/Recommendation - 5:  ·  Problem: Pneumothorax.  Recommendation: L pigtail   Thoracic sx follow up  Daily  CXR   Management per ICU

## 2021-04-21 NOTE — PROGRESS NOTE ADULT - SUBJECTIVE AND OBJECTIVE BOX
Pt is sedated, intubated, on MV support. Still with pigtail cath.     INTERVAL HPI/OVERNIGHT EVENTS:      VITAL SIGNS:  T(F): 98.2 (04-21-21 @ 07:45)  HR: 115 (04-21-21 @ 10:00)  BP: 129/57 (04-20-21 @ 12:00)  RR: 22 (04-21-21 @ 10:00)  SpO2: 94% (04-21-21 @ 10:00)  Wt(kg): --  I&O's Detail    20 Apr 2021 07:01  -  21 Apr 2021 07:00  --------------------------------------------------------  IN:    Enteral Tube Flush: 50 mL    FentaNYL: 712 mL    FentaNYL: 126 mL    Glucerna 1.5: 175 mL    IV PiggyBack: 249.9 mL    Midazolam: 301 mL    Phenylephrine: 19.6 mL  Total IN: 1633.5 mL    OUT:    Chest Tube (mL): 80 mL    Incontinent per Condom Catheter (mL): 2790 mL  Total OUT: 2870 mL    Total NET: -1236.5 mL      21 Apr 2021 07:01  -  21 Apr 2021 10:16  --------------------------------------------------------  IN:    FentaNYL: 126 mL    IV PiggyBack: 249.9 mL    Midazolam: 37.5 mL  Total IN: 413.4 mL    OUT:    Incontinent per Condom Catheter (mL): 200 mL  Total OUT: 200 mL    Total NET: 213.4 mL        Mode: AC/ CMV (Assist Control/ Continuous Mandatory Ventilation)  RR (machine): 30  TV (machine): 320  FiO2: 60  PEEP: 5  ITime: 1  MAP: 12  PIP: 34        REVIEW OF SYSTEMS:    Sedated, Intubated.       PHYSICAL EXAM:    Constitutional: Well developed and nourished  Eyes:Perrla  ENMT: normal  Neck:supple  Respiratory: intubated on MV support   Cardiovascular: S1 S2 regular  Gastrointestinal: Soft, Non tender  Extremities: No edema  Vascular:normal  Neurological: sedated.   Musculoskeletal:Normal      MEDICATIONS  (STANDING):  ascorbic acid 1000 milliGRAM(s) Oral daily  aspirin  chewable 81 milliGRAM(s) Oral daily  chlorhexidine 0.12% Liquid 15 milliLiter(s) Oral Mucosa two times a day  chlorhexidine 2% Cloths 1 Application(s) Topical <User Schedule>  chlorhexidine 2% Cloths 1 Application(s) Topical daily  fentaNYL   Infusion 5 MICROgram(s)/kG/Hr (41.9 mL/Hr) IV Continuous <Continuous>  insulin lispro (ADMELOG) corrective regimen sliding scale   SubCutaneous every 6 hours  midazolam Infusion 0.02 mG/kG/Hr (1.68 mL/Hr) IV Continuous <Continuous>  midodrine. 10 milliGRAM(s) Oral three times a day  pantoprazole  Injectable 40 milliGRAM(s) IV Push daily  phenylephrine    Infusion 1.5 MICROgram(s)/kG/Min (23.6 mL/Hr) IV Continuous <Continuous>  polyethylene glycol 3350 17 Gram(s) Oral daily  senna 2 Tablet(s) Oral at bedtime    MEDICATIONS  (PRN):  acetaminophen    Suspension .. 650 milliGRAM(s) Oral every 6 hours PRN Temp greater or equal to 38C (100.4F)  ALBUTerol    90 MICROgram(s) HFA Inhaler 2 Puff(s) Inhalation every 6 hours PRN Shortness of Breath and/or Wheezing  artificial  tears Solution 1 Drop(s) Both EYES every 4 hours PRN Dry Eyes  sodium chloride 0.9% lock flush 10 milliLiter(s) IV Push every 1 hour PRN Pre/post blood products, medications, blood draw, and to maintain line patency      Allergies    No Known Allergies    Intolerances        LABS:                        8.2    8.44  )-----------( 369      ( 21 Apr 2021 03:58 )             28.3     04-21    150<H>  |  102  |  23<H>  ----------------------------<  115<H>  4.3   |  >45<HH>  |  0.29<L>    Ca    8.7      21 Apr 2021 03:58  Phos  1.8     04-21  Mg     2.2     04-21    TPro  6.3  /  Alb  2.4<L>  /  TBili  0.3  /  DBili  x   /  AST  25  /  ALT  23  /  AlkPhos  100  04-21    PT/INR - ( 21 Apr 2021 03:58 )   PT: 13.7 sec;   INR: 1.16 ratio         PTT - ( 21 Apr 2021 03:58 )  PTT:33.2 sec    ABG - ( 21 Apr 2021 03:42 )  pH, Arterial: 7.32  pH, Blood: x     /  pCO2: 116   /  pO2: 71    / HCO3: 60    / Base Excess: 26.6  /  SaO2: NR                    CAPILLARY BLOOD GLUCOSE      POCT Blood Glucose.: 113 mg/dL (21 Apr 2021 05:07)  POCT Blood Glucose.: 135 mg/dL (20 Apr 2021 23:31)  POCT Blood Glucose.: 134 mg/dL (20 Apr 2021 18:03)  POCT Blood Glucose.: 127 mg/dL (20 Apr 2021 12:17)    pro-bnp -- 04-21 @ 03:58     d-dimer 2160  04-21 @ 03:58  pro-bnp -- 04-20 @ 03:50     d-dimer 2911  04-20 @ 03:50  pro-bnp -- 04-19 @ 04:14     d-dimer 2394  04-19 @ 04:14  pro-bnp -- 04-18 @ 06:39     d-dimer 1778  04-18 @ 06:39  pro-bnp -- 04-15 @ 06:08     d-dimer 841  04-15 @ 06:08      RADIOLOGY & ADDITIONAL TESTS:    CXR:    IMPRESSION: Unchanged advanced infiltrates and catheter left chest tube.    Ct scan chest:    ekg;    echo:

## 2021-04-21 NOTE — PROGRESS NOTE ADULT - SUBJECTIVE AND OBJECTIVE BOX
INTERVAL HPI/OVERNIGHT EVENTS: ***    PRESSORS: [ ] YES [ ] NO  WHICH:    ANTIBIOTICS:                  DATE STARTED:  ANTIBIOTICS:                  DATE STARTED:  ANTIBIOTICS:                  DATE STARTED:    Antimicrobial:    Cardiovascular:  midodrine. 10 milliGRAM(s) Oral three times a day  phenylephrine    Infusion 1.5 MICROgram(s)/kG/Min IV Continuous <Continuous>    Pulmonary:  ALBUTerol    90 MICROgram(s) HFA Inhaler 2 Puff(s) Inhalation every 6 hours PRN    Hematalogic:  aspirin  chewable 81 milliGRAM(s) Oral daily    Other:  acetaminophen    Suspension .. 650 milliGRAM(s) Oral every 6 hours PRN  artificial  tears Solution 1 Drop(s) Both EYES every 4 hours PRN  ascorbic acid 1000 milliGRAM(s) Oral daily  chlorhexidine 0.12% Liquid 15 milliLiter(s) Oral Mucosa two times a day  chlorhexidine 2% Cloths 1 Application(s) Topical <User Schedule>  chlorhexidine 2% Cloths 1 Application(s) Topical daily  fentaNYL    Injectable 50 MICROGram(s) IV Push once  fentaNYL   Infusion 5 MICROgram(s)/kG/Hr IV Continuous <Continuous>  insulin lispro (ADMELOG) corrective regimen sliding scale   SubCutaneous every 6 hours  midazolam Infusion 0.02 mG/kG/Hr IV Continuous <Continuous>  pantoprazole  Injectable 40 milliGRAM(s) IV Push daily  polyethylene glycol 3350 17 Gram(s) Oral daily  senna 2 Tablet(s) Oral at bedtime  sodium chloride 0.9% lock flush 10 milliLiter(s) IV Push every 1 hour PRN    acetaminophen    Suspension .. 650 milliGRAM(s) Oral every 6 hours PRN  ALBUTerol    90 MICROgram(s) HFA Inhaler 2 Puff(s) Inhalation every 6 hours PRN  artificial  tears Solution 1 Drop(s) Both EYES every 4 hours PRN  ascorbic acid 1000 milliGRAM(s) Oral daily  aspirin  chewable 81 milliGRAM(s) Oral daily  chlorhexidine 0.12% Liquid 15 milliLiter(s) Oral Mucosa two times a day  chlorhexidine 2% Cloths 1 Application(s) Topical <User Schedule>  chlorhexidine 2% Cloths 1 Application(s) Topical daily  fentaNYL    Injectable 50 MICROGram(s) IV Push once  fentaNYL   Infusion 5 MICROgram(s)/kG/Hr IV Continuous <Continuous>  insulin lispro (ADMELOG) corrective regimen sliding scale   SubCutaneous every 6 hours  midazolam Infusion 0.02 mG/kG/Hr IV Continuous <Continuous>  midodrine. 10 milliGRAM(s) Oral three times a day  pantoprazole  Injectable 40 milliGRAM(s) IV Push daily  phenylephrine    Infusion 1.5 MICROgram(s)/kG/Min IV Continuous <Continuous>  polyethylene glycol 3350 17 Gram(s) Oral daily  senna 2 Tablet(s) Oral at bedtime  sodium chloride 0.9% lock flush 10 milliLiter(s) IV Push every 1 hour PRN    Drug Dosing Weight  Height (cm): 167.6 (14 Mar 2021 12:03)  Weight (kg): 83.869 (14 Mar 2021 12:03)  BMI (kg/m2): 29.9 (14 Mar 2021 12:03)  BSA (m2): 1.93 (14 Mar 2021 12:03)    CENTRAL LINE: [ ] YES [ ] NO  LOCATION:   DATE INSERTED:  REMOVE: [ ] YES [ ] NO  EXPLAIN:    RIVERA: [ ] YES [ ] NO    DATE INSERTED:  REMOVE:  [ ] YES [ ] NO  EXPLAIN:    A-LINE:  [ ] YES [ ] NO  LOCATION:   DATE INSERTED:  REMOVE:  [ ] YES [ ] NO  EXPLAIN:    PMH -reviewed admission note, no change since admission  PAST MEDICAL & SURGICAL HISTORY:  No pertinent past medical history    S/P moody  1990s    S/P appendectomy  1990s        ICU Vital Signs Last 24 Hrs  T(C): 36.8 (21 Apr 2021 07:45), Max: 37.9 (20 Apr 2021 13:00)  T(F): 98.2 (21 Apr 2021 07:45), Max: 100.3 (20 Apr 2021 13:00)  HR: 113 (21 Apr 2021 08:00) (92 - 147)  BP: 129/57 (20 Apr 2021 12:00) (129/57 - 129/57)  BP(mean): 73 (20 Apr 2021 12:00) (73 - 73)  ABP: 138/78 (21 Apr 2021 08:00) (102/62 - 161/88)  ABP(mean): 104 (21 Apr 2021 08:00) (76 - 113)  RR: 24 (21 Apr 2021 08:00) (14 - 31)  SpO2: 96% (21 Apr 2021 08:00) (90% - 100%)      ABG - ( 21 Apr 2021 03:42 )  pH, Arterial: 7.32  pH, Blood: x     /  pCO2: 116   /  pO2: 71    / HCO3: 60    / Base Excess: 26.6  /  SaO2: NR                    04-20 @ 07:01  -  04-21 @ 07:00  --------------------------------------------------------  IN: 1633.5 mL / OUT: 2870 mL / NET: -1236.5 mL        Mode: AC/ CMV (Assist Control/ Continuous Mandatory Ventilation)  RR (machine): 30  TV (machine): 320  FiO2: 60  PEEP: 5  ITime: 1  MAP: 11  PIP: 30      PHYSICAL EXAM:    GENERAL: [ ]NAD, [ ]well-groomed, [ ]well-developed  HEAD:  [ ]Atraumatic, [ ]Normocephalic  EYES: [ ]EOMI, [ ]PERRLA, [ ]conjunctiva and sclera clear  ENMT: [ ]No tonsillar erythema, exudates, or enlargement; [ ]Moist mucous membranes, [ ]Good dentition, [ ]No lesions  NECK: [ ]Supple, normal appearance, [ ]No JVD; [ ]Normal thyroid; [ ]Trachea midline  NERVOUS SYSTEM:  [ ]Alert & Oriented X3, [ ]Good concentration; [ ]Motor Strength 5/5 B/L upper and lower extremities; [ ]DTRs 2+ intact and symmetric  CHEST/LUNG: [ ]No chest deformity; [ ]Normal percussion bilaterally; [ ]No rales, rhonchi, wheezing   HEART: [ ]Regular rate and rhythm; [ ]No murmurs, rubs, or gallops  ABDOMEN: [ ]Soft, Nontender, Nondistended; [ ]Bowel sounds present  EXTREMITIES:  [ ]2+ Peripheral Pulses, [ ]No clubbing, cyanosis, or edema  LYMPH: [ ]No lymphadenopathy noted  SKIN: [ ]No rashes or lesions; [ ]Good capillary refill      LABS:  CBC Full  -  ( 21 Apr 2021 03:58 )  WBC Count : 8.44 K/uL  RBC Count : 2.51 M/uL  Hemoglobin : 8.2 g/dL  Hematocrit : 28.3 %  Platelet Count - Automated : 369 K/uL  Mean Cell Volume : 112.7 fl  Mean Cell Hemoglobin : 32.7 pg  Mean Cell Hemoglobin Concentration : 29.0 gm/dL  Auto Neutrophil # : 7.26 K/uL  Auto Lymphocyte # : 0.68 K/uL  Auto Monocyte # : 0.17 K/uL  Auto Eosinophil # : 0.08 K/uL  Auto Basophil # : 0.00 K/uL  Auto Neutrophil % : 85.0 %  Auto Lymphocyte % : 8.0 %  Auto Monocyte % : 2.0 %  Auto Eosinophil % : 1.0 %  Auto Basophil % : 0.0 %    04-21    150<H>  |  102  |  23<H>  ----------------------------<  115<H>  4.3   |  >45<HH>  |  0.29<L>    Ca    8.7      21 Apr 2021 03:58  Phos  1.8     04-21  Mg     2.2     04-21    TPro  6.3  /  Alb  2.4<L>  /  TBili  0.3  /  DBili  x   /  AST  25  /  ALT  23  /  AlkPhos  100  04-21    PT/INR - ( 21 Apr 2021 03:58 )   PT: 13.7 sec;   INR: 1.16 ratio         PTT - ( 21 Apr 2021 03:58 )  PTT:33.2 sec        RADIOLOGY & ADDITIONAL STUDIES REVIEWED:  ***    [ ]GOALS OF CARE DISCUSSION WITH PATIENT/FAMILY/PROXY:    CRITICAL CARE TIME SPENT: 35 minutes INTERVAL HPI/OVERNIGHT EVENTS: Patient was tachycardiac and received 2 doses of lopressot IV push and 1 dose of IV lasix.   1 dose of Epifanio for vent synchrony     PRESSORS: [ ] YES [x ] NO  WHICH:    ANTIBIOTICS:                  DATE STARTED:  ANTIBIOTICS:                  DATE STARTED:  ANTIBIOTICS:                  DATE STARTED:    Antimicrobial:    Cardiovascular:  midodrine. 10 milliGRAM(s) Oral three times a day  phenylephrine    Infusion 1.5 MICROgram(s)/kG/Min IV Continuous <Continuous>    Pulmonary:  ALBUTerol    90 MICROgram(s) HFA Inhaler 2 Puff(s) Inhalation every 6 hours PRN    Hematalogic:  aspirin  chewable 81 milliGRAM(s) Oral daily    Other:  acetaminophen    Suspension .. 650 milliGRAM(s) Oral every 6 hours PRN  artificial  tears Solution 1 Drop(s) Both EYES every 4 hours PRN  ascorbic acid 1000 milliGRAM(s) Oral daily  chlorhexidine 0.12% Liquid 15 milliLiter(s) Oral Mucosa two times a day  chlorhexidine 2% Cloths 1 Application(s) Topical <User Schedule>  chlorhexidine 2% Cloths 1 Application(s) Topical daily  fentaNYL    Injectable 50 MICROGram(s) IV Push once  fentaNYL   Infusion 5 MICROgram(s)/kG/Hr IV Continuous <Continuous>  insulin lispro (ADMELOG) corrective regimen sliding scale   SubCutaneous every 6 hours  midazolam Infusion 0.02 mG/kG/Hr IV Continuous <Continuous>  pantoprazole  Injectable 40 milliGRAM(s) IV Push daily  polyethylene glycol 3350 17 Gram(s) Oral daily  senna 2 Tablet(s) Oral at bedtime  sodium chloride 0.9% lock flush 10 milliLiter(s) IV Push every 1 hour PRN    acetaminophen    Suspension .. 650 milliGRAM(s) Oral every 6 hours PRN  ALBUTerol    90 MICROgram(s) HFA Inhaler 2 Puff(s) Inhalation every 6 hours PRN  artificial  tears Solution 1 Drop(s) Both EYES every 4 hours PRN  ascorbic acid 1000 milliGRAM(s) Oral daily  aspirin  chewable 81 milliGRAM(s) Oral daily  chlorhexidine 0.12% Liquid 15 milliLiter(s) Oral Mucosa two times a day  chlorhexidine 2% Cloths 1 Application(s) Topical <User Schedule>  chlorhexidine 2% Cloths 1 Application(s) Topical daily  fentaNYL    Injectable 50 MICROGram(s) IV Push once  fentaNYL   Infusion 5 MICROgram(s)/kG/Hr IV Continuous <Continuous>  insulin lispro (ADMELOG) corrective regimen sliding scale   SubCutaneous every 6 hours  midazolam Infusion 0.02 mG/kG/Hr IV Continuous <Continuous>  midodrine. 10 milliGRAM(s) Oral three times a day  pantoprazole  Injectable 40 milliGRAM(s) IV Push daily  phenylephrine    Infusion 1.5 MICROgram(s)/kG/Min IV Continuous <Continuous>  polyethylene glycol 3350 17 Gram(s) Oral daily  senna 2 Tablet(s) Oral at bedtime  sodium chloride 0.9% lock flush 10 milliLiter(s) IV Push every 1 hour PRN    Drug Dosing Weight  Height (cm): 167.6 (14 Mar 2021 12:03)  Weight (kg): 83.869 (14 Mar 2021 12:03)  BMI (kg/m2): 29.9 (14 Mar 2021 12:03)  BSA (m2): 1.93 (14 Mar 2021 12:03)    CENTRAL LINE: [ x] YES [ ] NO  LOCATION: [facility]  DATE INSERTED: 4/14  REMOVE: [ ] YES [ ] NO  EXPLAIN:    RIVERA: [ ] YES [ x] NO    DATE INSERTED:  REMOVE:  [ ] YES [ ] NO  EXPLAIN:    A-LINE:  [ x] YES [ ] NO  LOCATION: [facility]   DATE INSERTED: 4/20  REMOVE:  [ ] YES [ ] NO  EXPLAIN:    PMH -reviewed admission note, no change since admission  PAST MEDICAL & SURGICAL HISTORY:  No pertinent past medical history    S/P moody  1990s    S/P appendectomy  1990s        ICU Vital Signs Last 24 Hrs  T(C): 36.8 (21 Apr 2021 07:45), Max: 37.9 (20 Apr 2021 13:00)  T(F): 98.2 (21 Apr 2021 07:45), Max: 100.3 (20 Apr 2021 13:00)  HR: 113 (21 Apr 2021 08:00) (92 - 147)  BP: 129/57 (20 Apr 2021 12:00) (129/57 - 129/57)  BP(mean): 73 (20 Apr 2021 12:00) (73 - 73)  ABP: 138/78 (21 Apr 2021 08:00) (102/62 - 161/88)  ABP(mean): 104 (21 Apr 2021 08:00) (76 - 113)  RR: 24 (21 Apr 2021 08:00) (14 - 31)  SpO2: 96% (21 Apr 2021 08:00) (90% - 100%)      ABG - ( 21 Apr 2021 03:42 )  pH, Arterial: 7.32  pH, Blood: x     /  pCO2: 116   /  pO2: 71    / HCO3: 60    / Base Excess: 26.6  /  SaO2: NR                    04-20 @ 07:01  -  04-21 @ 07:00  --------------------------------------------------------  IN: 1633.5 mL / OUT: 2870 mL / NET: -1236.5 mL        Mode: AC/ CMV (Assist Control/ Continuous Mandatory Ventilation)  RR (machine): 30  TV (machine): 320  FiO2: 60  PEEP: 5  ITime: 1  MAP: 11  PIP: 30      PHYSICAL EXAM:    GENERAL: NAD, on Vent, sedated  HEAD: Normocephalic, atraumatic  EYES:  Dry eyes, conjunctivae/sclera clear, equal pupils  MOUTH : ET Tube  NECK: Supple,   LIJ, No JVD; Normal thyroid; Trachea midline no lymphadenopathy   NERVOUS SYSTEM:  sedated  CHEST/LUNG: B/L crackles (R>L), equal lung expansion L eft CXR  HEART: Regular rate and rhythm; No murmurs, rubs, or gallops, tachycardia  ABDOMEN: Soft, Nontender, Nondistended; Bowel sounds present  EXTREMITIES:  b/l upper extremities edema +2,  no edema on lower legs. No clubbing, cyanosis   SKIN: No rashes  + capillary refill <2 sec        LABS:  CBC Full  -  ( 21 Apr 2021 03:58 )  WBC Count : 8.44 K/uL  RBC Count : 2.51 M/uL  Hemoglobin : 8.2 g/dL  Hematocrit : 28.3 %  Platelet Count - Automated : 369 K/uL  Mean Cell Volume : 112.7 fl  Mean Cell Hemoglobin : 32.7 pg  Mean Cell Hemoglobin Concentration : 29.0 gm/dL  Auto Neutrophil # : 7.26 K/uL  Auto Lymphocyte # : 0.68 K/uL  Auto Monocyte # : 0.17 K/uL  Auto Eosinophil # : 0.08 K/uL  Auto Basophil # : 0.00 K/uL  Auto Neutrophil % : 85.0 %  Auto Lymphocyte % : 8.0 %  Auto Monocyte % : 2.0 %  Auto Eosinophil % : 1.0 %  Auto Basophil % : 0.0 %    04-21    150<H>  |  102  |  23<H>  ----------------------------<  115<H>  4.3   |  >45<HH>  |  0.29<L>    Ca    8.7      21 Apr 2021 03:58  Phos  1.8     04-21  Mg     2.2     04-21    TPro  6.3  /  Alb  2.4<L>  /  TBili  0.3  /  DBili  x   /  AST  25  /  ALT  23  /  AlkPhos  100  04-21    PT/INR - ( 21 Apr 2021 03:58 )   PT: 13.7 sec;   INR: 1.16 ratio         PTT - ( 21 Apr 2021 03:58 )  PTT:33.2 sec        RADIOLOGY & ADDITIONAL STUDIES REVIEWED:  yes    [ ]GOALS OF CARE DISCUSSION WITH PATIENT/FAMILY/PROXY:    CRITICAL CARE TIME SPENT: 35 minutes

## 2021-04-21 NOTE — PROGRESS NOTE ADULT - ATTENDING COMMENTS
55 yr old  man , non smoker with  moody 1990s presented 3/14 with x9 days worsening cough, subjective fevers, and SOB, with x2-3 days dysuria and central, non-radiating, constant CP. Admitted to medicine unit  for acute hypoxic respiratory failure secondary to pna from covid-19 infection .     Assessment:  1. Acute hypoxic respiratory failure  2 Covid-19 infection   3. Transaminitis  4. Prediabetes  5. Bilateral pneumothorax  6. Septic shock     Plan   -ID f/u prelim : d/c antibx ,   -Sputum post for CRE  -isolate   -pat intubated 3/29   -for trach and PEG 4/21  -hold anticog and pre-op  -thoracic f/u noted   -adjust vent as per ABG  -permissive hypercapnia   -Vasopressor support  -PTX  improved post chest tube placement  -chest tube removed 4/15  -left large PTX  -4/18 left chest tube re-placed with left lung re-expansion   -keep chest tube to suction   -Cont. precedex and fentanyl  -Cont. versed/fentanyl as patient asynchronous with the vent  -Albumin/lasix for diuresis  -increase midodrine 10 mg tid  -isolation : contact and air borne   -monitor biomarkers daily, trending down   -Tube feedings, bowel regimen  -dvt/gi prophy  -hemodynamic monitoring   -Prognosis is guarded . .   -on antibx for PNA .

## 2021-04-21 NOTE — PROGRESS NOTE ADULT - ASSESSMENT
1. Acute hypoxic respiratory failure  2. ARDS 2/2 Covid pneumonia  3. Transaminitis  4. Prediabetes  5. Abnormal TSH    =================== Neuro============================  Alert and oriented x 3 at baseline   intubated and sedated 3/29 on Vent  Continue Versed to help with vent synchrony with Fentanyl  CT head unremarkable     ================= Cardiovascular==========================  # Hypotension      C/w midodrine   taper off phenylephrine as tolerated       # TACHYCARDIA: improving  -HR in 90s-100s.   -continue monitoring     ================- Pulm=================================  #Acute hypoxic respiratory failure: secondary to Covid pneumonia  #ARDS  -was on HFNr then got intubated 3/29  -VC Vent setting : 30/320/70/5  -D-dimer initially elevated on presentation, Slowly rising again   - Patient on increased FiO2 requirement, will monitor   - f/u thoracic surgery for trach and peg on 4/20  - s/p NImbex and Epifanio at different occasions for vent synchrony  - s/p Pigtail on left chest , removed on 4/15  - Large Pneumothorax was noted on 4/18 on left side, s/p chest tube placement    -Remdesivir was discontinued due to positive antibodies   - c/w supportive care   - trend COVID  markers  - Finished Dexamethasone   - prednisone taper, Finished      # Bacterial Pneumonia  - stable infiltrate on CXR ,likely developed VAP, on Zosyn, s/p 1 dose of Vanco   - continue Cefepime 4/19, f/u ID for further recs  - MRSA negative from 3/26  - Sputum Cx growing few gram negative rods,   - Pulm. dr. Ryan  - ID consult Dr Anand       #Pneumothorax and pneumomediastinum: resolved  -X-ray chest: tiny left apical pneumothorax  -Thoracic surgery following  -s/p Chest tube placed 4/8 pigtail to wall suction.  d/c'd 4/15  -X-ray monitoring daily, PTX resolved      ==================ID===================================  Likely bacterial pneumonia at present  -leukocytosis resolved  -Still spiking fevers,  started on Alternative doses of Motrin and Tylenol for fever control  - Continue Meropenem (D 6), f/u ID for duration   - C-procal elevated  -plan as above     ================= Nephro================================  -pitting edema to both lower arms  -on condom cath   -monitor I/Os   -Net negative , Urine output monitoring  -s/p Albumin x 2 days on 4/10  -Lasix 40mg BID with Albumin 25% Q6 for 48 hours to help with urine output and fluid status.( 4/15) Albumin finished , was D/jennifer on 4/19, urine output decreasing  - Will give another dose   - s/p 1 dose of Diamox  -C/w HCTZ 50mg daily   -monitor lytes, CMP    =================GI====================================  Transaminitis:   likely secondary to Covid19   - and  on presentation   hepatitis panel -ve  -Improved  -continue to monitor LFT    diet:   NGT (3/29) with tube feed  bowel regimen   Last BM 4/16      ================ Heme==================================  Elevated d-dimer: likely secondary to Covid  -d-dimer 423 on admission  -last D-dimer 841<---1313  -continue prophylactic Lovenox 40 mg daily    Anemia  Hgb 8.8, no active signs of bleeding  f/u iron panel, FOBT  will transfuse  if hgb drops.     =================Endocrine===============================  Prediabetes:    -A1c  5.8  -BS controlled  -continue HSS  -monitor FS while on steroids    Abnormal TSH:  -TSH level noted 0.26,   -repeat TSH 0.37 and Free T4 1.82    ================= Skin/Catheters============================  No rashes. Peripheral IV lines.   RIJ 4/13  RRA 3/29, Removed  RBA 4/20    - =================Prophylaxis =============================  Lovenox for DVT proph : Lovenox 40mg SQ daily  Protonix for  GI proph    ==================GOC==================================   FULL CODE     1. Acute hypoxic respiratory failure  2. ARDS 2/2 Covid pneumonia  3. Transaminitis  4. Prediabetes  5. Abnormal TSH    =================== Neuro============================  Alert and oriented x 3 at baseline   intubated and sedated 3/29 on Vent  Continue Versed to help with vent synchrony with Fentanyl  Added propofol for sedation   CT head unremarkable     ================= Cardiovascular==========================  # Hypotension      Decrease midodrine to 5mg Q8  taper off phenylephrine as tolerated       # TACHYCARDIA: recurrent episodes   -Lopressor PNR pushes   -HR in 110's  -continue monitoring     ================- Pulm=================================  #Acute hypoxic respiratory failure: secondary to Covid pneumonia  #ARDS  -was on HFNr then got intubated 3/29  -VC Vent setting : 30/320/70/5  -D-dimer initially elevated on presentation, trending down    - f/u thoracic surgery for trach and peg on 4/21  - s/p Nimbex and Epifanio at different occasions for vent synchrony  - s/p Pigtail on left chest , removed on 4/15  - Large Pneumothorax was noted on 4/18 on left side, s/p chest tube placement    -Remdesivir was discontinued due to positive antibodies   - c/w supportive care   - trend COVID  markers  - Finished Dexamethasone   - prednisone taper, Finished      # Bacterial Pneumonia  - stable infiltrate on CXR ,likely developed VAP, on Zosyn, s/p 1 dose of Vanco   - continue Cefepime 4/19, f/u ID for further recs  - MRSA negative from 3/26  - Sputum Cx growing few gram negative rods, CRE  - Pulm. dr. Ryan  - ID consult Dr Anand       #Pneumothorax and pneumomediastinum: resolved  -X-ray chest: tiny left apical pneumothorax  -Thoracic surgery following  -s/p Chest tube placed 4/8 pigtail to wall suction.  d/c'd 4/15  -X-ray monitoring daily, PTX resolved      ==================ID===================================  Likely bacterial pneumonia at present  -leukocytosis resolved  -No more fevers, On tylenol PRN only   - Finished Meropenem  - C-procal elevated  -plan as above     ================= Nephro================================  -pitting edema to both lower arms  -on condom cath   -monitor I/Os   -Net negative , Urine output monitoring  -s/p Albumin x 2 days on 4/10  -Lasix 40mg BID with Albumin 25% Q6 for 48 hours to help with urine output and fluid status.( 4/15) Albumin finished , was D/jennifer on 4/19, urine output decreasing  - Patient can get Lasix as needed  , closely monitor for urine output   -monitor lytes, CMP    =================GI====================================  Transaminitis:   likely secondary to Covid19   - and  on presentation   hepatitis panel -ve  -Improved  -continue to monitor LFT    diet:   NGT (3/29) with tube feed  bowel regimen   Last BM 4/16      ================ Heme==================================  Elevated d-dimer: likely secondary to Covid  -d-dimer 423 on admission  -last D-dimer 2160, trending down   -continue prophylactic Lovenox 40 mg daily    Anemia  Hgb 8.8, no active signs of bleeding  will transfuse  if hgb drops.     =================Endocrine===============================  Prediabetes:    -A1c  5.8  -BS controlled  -continue HSS  -monitor FS while on steroids    Abnormal TSH:  -TSH level noted 0.26,   -repeat TSH 0.37 and Free T4 1.82    ================= Skin/Catheters============================  No rashes. Peripheral IV lines.   RIJ 4/13  RRA 3/29, Removed  RBA 4/20    - =================Prophylaxis =============================  Lovenox for DVT proph : Lovenox 40mg SQ daily  Protonix for  GI proph    ==================GOC==================================   FULL CODE

## 2021-04-22 ENCOUNTER — TRANSCRIPTION ENCOUNTER (OUTPATIENT)
Age: 56
End: 2021-04-22

## 2021-04-22 LAB
ALBUMIN SERPL ELPH-MCNC: 2.2 G/DL — LOW (ref 3.5–5)
ALP SERPL-CCNC: 86 U/L — SIGNIFICANT CHANGE UP (ref 40–120)
ALT FLD-CCNC: 21 U/L DA — SIGNIFICANT CHANGE UP (ref 10–60)
ANION GAP SERPL CALC-SCNC: 1 MMOL/L — LOW (ref 5–17)
ANION GAP SERPL CALC-SCNC: <3 MMOL/L — LOW (ref 5–17)
APTT BLD: 36.4 SEC — HIGH (ref 27.5–35.5)
AST SERPL-CCNC: 24 U/L — SIGNIFICANT CHANGE UP (ref 10–40)
BASE EXCESS BLDA CALC-SCNC: 22.4 MMOL/L — HIGH (ref -2–3)
BASE EXCESS BLDA CALC-SCNC: 23.3 MMOL/L — HIGH (ref -2–3)
BASE EXCESS BLDA CALC-SCNC: 25.3 MMOL/L — HIGH (ref -2–3)
BASOPHILS # BLD AUTO: 0.04 K/UL — SIGNIFICANT CHANGE UP (ref 0–0.2)
BASOPHILS NFR BLD AUTO: 0.5 % — SIGNIFICANT CHANGE UP (ref 0–2)
BILIRUB SERPL-MCNC: 0.4 MG/DL — SIGNIFICANT CHANGE UP (ref 0.2–1.2)
BLOOD GAS COMMENTS ARTERIAL: SIGNIFICANT CHANGE UP
BUN SERPL-MCNC: 14 MG/DL — SIGNIFICANT CHANGE UP (ref 7–18)
BUN SERPL-MCNC: 19 MG/DL — HIGH (ref 7–18)
CALCIUM SERPL-MCNC: 7.8 MG/DL — LOW (ref 8.4–10.5)
CALCIUM SERPL-MCNC: 8.1 MG/DL — LOW (ref 8.4–10.5)
CHLORIDE SERPL-SCNC: 101 MMOL/L — SIGNIFICANT CHANGE UP (ref 96–108)
CHLORIDE SERPL-SCNC: 104 MMOL/L — SIGNIFICANT CHANGE UP (ref 96–108)
CO2 SERPL-SCNC: 42 MMOL/L — HIGH (ref 22–31)
CO2 SERPL-SCNC: >45 MMOL/L — CRITICAL HIGH (ref 22–31)
CREAT SERPL-MCNC: 0.2 MG/DL — LOW (ref 0.5–1.3)
CREAT SERPL-MCNC: 0.27 MG/DL — LOW (ref 0.5–1.3)
D DIMER BLD IA.RAPID-MCNC: 1367 NG/ML DDU — HIGH
EOSINOPHIL # BLD AUTO: 0.2 K/UL — SIGNIFICANT CHANGE UP (ref 0–0.5)
EOSINOPHIL NFR BLD AUTO: 2.7 % — SIGNIFICANT CHANGE UP (ref 0–6)
GLUCOSE BLDC GLUCOMTR-MCNC: 103 MG/DL — HIGH (ref 70–99)
GLUCOSE BLDC GLUCOMTR-MCNC: 109 MG/DL — HIGH (ref 70–99)
GLUCOSE BLDC GLUCOMTR-MCNC: 87 MG/DL — SIGNIFICANT CHANGE UP (ref 70–99)
GLUCOSE BLDC GLUCOMTR-MCNC: 93 MG/DL — SIGNIFICANT CHANGE UP (ref 70–99)
GLUCOSE SERPL-MCNC: 100 MG/DL — HIGH (ref 70–99)
GLUCOSE SERPL-MCNC: 111 MG/DL — HIGH (ref 70–99)
HCO3 BLDA-SCNC: 52 MMOL/L — CRITICAL HIGH (ref 21–28)
HCO3 BLDA-SCNC: 52 MMOL/L — CRITICAL HIGH (ref 21–28)
HCO3 BLDA-SCNC: 58 MMOL/L — CRITICAL HIGH (ref 21–28)
HCT VFR BLD CALC: 26.6 % — LOW (ref 39–50)
HGB BLD-MCNC: 7.8 G/DL — LOW (ref 13–17)
HOROWITZ INDEX BLDA+IHG-RTO: 60 — SIGNIFICANT CHANGE UP
HOROWITZ INDEX BLDA+IHG-RTO: 65 — SIGNIFICANT CHANGE UP
HOROWITZ INDEX BLDA+IHG-RTO: 65 — SIGNIFICANT CHANGE UP
IMM GRANULOCYTES NFR BLD AUTO: 6.2 % — HIGH (ref 0–1.5)
INR BLD: 1.12 RATIO — SIGNIFICANT CHANGE UP (ref 0.88–1.16)
LYMPHOCYTES # BLD AUTO: 1.3 K/UL — SIGNIFICANT CHANGE UP (ref 1–3.3)
LYMPHOCYTES # BLD AUTO: 17.2 % — SIGNIFICANT CHANGE UP (ref 13–44)
MAGNESIUM SERPL-MCNC: 2 MG/DL — SIGNIFICANT CHANGE UP (ref 1.6–2.6)
MAGNESIUM SERPL-MCNC: 2.2 MG/DL — SIGNIFICANT CHANGE UP (ref 1.6–2.6)
MCHC RBC-ENTMCNC: 29.3 GM/DL — LOW (ref 32–36)
MCHC RBC-ENTMCNC: 33.2 PG — SIGNIFICANT CHANGE UP (ref 27–34)
MCV RBC AUTO: 113.2 FL — HIGH (ref 80–100)
MONOCYTES # BLD AUTO: 0.51 K/UL — SIGNIFICANT CHANGE UP (ref 0–0.9)
MONOCYTES NFR BLD AUTO: 6.8 % — SIGNIFICANT CHANGE UP (ref 2–14)
NEUTROPHILS # BLD AUTO: 5.02 K/UL — SIGNIFICANT CHANGE UP (ref 1.8–7.4)
NEUTROPHILS NFR BLD AUTO: 66.6 % — SIGNIFICANT CHANGE UP (ref 43–77)
NRBC # BLD: 0 /100 WBCS — SIGNIFICANT CHANGE UP (ref 0–0)
PCO2 BLDA: 110 MMHG — CRITICAL HIGH (ref 35–48)
PCO2 BLDA: 73 MMHG — CRITICAL HIGH (ref 35–48)
PCO2 BLDA: 80 MMHG — CRITICAL HIGH (ref 35–48)
PH BLDA: 7.33 — LOW (ref 7.35–7.45)
PH BLDA: 7.42 — SIGNIFICANT CHANGE UP (ref 7.35–7.45)
PH BLDA: 7.46 — HIGH (ref 7.35–7.45)
PHOSPHATE SERPL-MCNC: 1.6 MG/DL — LOW (ref 2.5–4.5)
PHOSPHATE SERPL-MCNC: 1.8 MG/DL — LOW (ref 2.5–4.5)
PLATELET # BLD AUTO: 317 K/UL — SIGNIFICANT CHANGE UP (ref 150–400)
PO2 BLDA: 103 MMHG — SIGNIFICANT CHANGE UP (ref 83–108)
PO2 BLDA: 107 MMHG — SIGNIFICANT CHANGE UP (ref 83–108)
PO2 BLDA: 76 MMHG — LOW (ref 83–108)
POTASSIUM SERPL-MCNC: 3.3 MMOL/L — LOW (ref 3.5–5.3)
POTASSIUM SERPL-MCNC: 3.4 MMOL/L — LOW (ref 3.5–5.3)
POTASSIUM SERPL-SCNC: 3.3 MMOL/L — LOW (ref 3.5–5.3)
POTASSIUM SERPL-SCNC: 3.4 MMOL/L — LOW (ref 3.5–5.3)
PROT SERPL-MCNC: 5.7 G/DL — LOW (ref 6–8.3)
PROTHROM AB SERPL-ACNC: 13.3 SEC — SIGNIFICANT CHANGE UP (ref 10.6–13.6)
RBC # BLD: 2.35 M/UL — LOW (ref 4.2–5.8)
RBC # FLD: 15 % — HIGH (ref 10.3–14.5)
SAO2 % BLDA: 98 % — SIGNIFICANT CHANGE UP
SAO2 % BLDA: 99 % — SIGNIFICANT CHANGE UP
SAO2 % BLDA: 99 % — SIGNIFICANT CHANGE UP
SARS-COV-2 RNA SPEC QL NAA+PROBE: SIGNIFICANT CHANGE UP
SODIUM SERPL-SCNC: 147 MMOL/L — HIGH (ref 135–145)
SODIUM SERPL-SCNC: 149 MMOL/L — HIGH (ref 135–145)
WBC # BLD: 7.54 K/UL — SIGNIFICANT CHANGE UP (ref 3.8–10.5)
WBC # FLD AUTO: 7.54 K/UL — SIGNIFICANT CHANGE UP (ref 3.8–10.5)

## 2021-04-22 PROCEDURE — 71045 X-RAY EXAM CHEST 1 VIEW: CPT | Mod: 26

## 2021-04-22 PROCEDURE — 99233 SBSQ HOSP IP/OBS HIGH 50: CPT

## 2021-04-22 RX ORDER — POTASSIUM PHOSPHATE, MONOBASIC POTASSIUM PHOSPHATE, DIBASIC 236; 224 MG/ML; MG/ML
15 INJECTION, SOLUTION INTRAVENOUS ONCE
Refills: 0 | Status: DISCONTINUED | OUTPATIENT
Start: 2021-04-22 | End: 2021-04-22

## 2021-04-22 RX ORDER — METOPROLOL TARTRATE 50 MG
2.5 TABLET ORAL ONCE
Refills: 0 | Status: COMPLETED | OUTPATIENT
Start: 2021-04-22 | End: 2021-04-22

## 2021-04-22 RX ORDER — POTASSIUM PHOSPHATE, MONOBASIC POTASSIUM PHOSPHATE, DIBASIC 236; 224 MG/ML; MG/ML
30 INJECTION, SOLUTION INTRAVENOUS ONCE
Refills: 0 | Status: COMPLETED | OUTPATIENT
Start: 2021-04-22 | End: 2021-04-22

## 2021-04-22 RX ORDER — ACETAZOLAMIDE 250 MG/1
250 TABLET ORAL EVERY 12 HOURS
Refills: 0 | Status: DISCONTINUED | OUTPATIENT
Start: 2021-04-22 | End: 2021-04-23

## 2021-04-22 RX ORDER — POTASSIUM CHLORIDE 20 MEQ
40 PACKET (EA) ORAL ONCE
Refills: 0 | Status: DISCONTINUED | OUTPATIENT
Start: 2021-04-22 | End: 2021-04-22

## 2021-04-22 RX ORDER — DIAZEPAM 5 MG
5 TABLET ORAL EVERY 6 HOURS
Refills: 0 | Status: DISCONTINUED | OUTPATIENT
Start: 2021-04-22 | End: 2021-04-22

## 2021-04-22 RX ORDER — FENTANYL CITRATE 50 UG/ML
25 INJECTION INTRAVENOUS ONCE
Refills: 0 | Status: DISCONTINUED | OUTPATIENT
Start: 2021-04-22 | End: 2021-04-22

## 2021-04-22 RX ORDER — POTASSIUM CHLORIDE 20 MEQ
40 PACKET (EA) ORAL EVERY 4 HOURS
Refills: 0 | Status: COMPLETED | OUTPATIENT
Start: 2021-04-22 | End: 2021-04-23

## 2021-04-22 RX ORDER — POTASSIUM CHLORIDE 20 MEQ
20 PACKET (EA) ORAL
Refills: 0 | Status: COMPLETED | OUTPATIENT
Start: 2021-04-22 | End: 2021-04-22

## 2021-04-22 RX ORDER — MIDAZOLAM HYDROCHLORIDE 1 MG/ML
0.13 INJECTION, SOLUTION INTRAMUSCULAR; INTRAVENOUS
Qty: 100 | Refills: 0 | Status: DISCONTINUED | OUTPATIENT
Start: 2021-04-22 | End: 2021-04-23

## 2021-04-22 RX ORDER — MIDODRINE HYDROCHLORIDE 2.5 MG/1
10 TABLET ORAL THREE TIMES A DAY
Refills: 0 | Status: DISCONTINUED | OUTPATIENT
Start: 2021-04-22 | End: 2021-04-23

## 2021-04-22 RX ORDER — FENTANYL CITRATE 50 UG/ML
1 INJECTION INTRAVENOUS
Refills: 0 | Status: DISCONTINUED | OUTPATIENT
Start: 2021-04-22 | End: 2021-04-22

## 2021-04-22 RX ORDER — ENOXAPARIN SODIUM 100 MG/ML
40 INJECTION SUBCUTANEOUS ONCE
Refills: 0 | Status: COMPLETED | OUTPATIENT
Start: 2021-04-22 | End: 2021-04-22

## 2021-04-22 RX ADMIN — MIDAZOLAM HYDROCHLORIDE 10.9 MG/KG/HR: 1 INJECTION, SOLUTION INTRAMUSCULAR; INTRAVENOUS at 22:43

## 2021-04-22 RX ADMIN — PANTOPRAZOLE SODIUM 40 MILLIGRAM(S): 20 TABLET, DELAYED RELEASE ORAL at 11:21

## 2021-04-22 RX ADMIN — ACETAZOLAMIDE 105 MILLIGRAM(S): 250 TABLET ORAL at 18:21

## 2021-04-22 RX ADMIN — MIDODRINE HYDROCHLORIDE 10 MILLIGRAM(S): 2.5 TABLET ORAL at 11:20

## 2021-04-22 RX ADMIN — Medication 50 MILLIEQUIVALENT(S): at 06:25

## 2021-04-22 RX ADMIN — PROPOFOL 5.03 MICROGRAM(S)/KG/MIN: 10 INJECTION, EMULSION INTRAVENOUS at 17:10

## 2021-04-22 RX ADMIN — FENTANYL CITRATE 41.9 MICROGRAM(S)/KG/HR: 50 INJECTION INTRAVENOUS at 06:55

## 2021-04-22 RX ADMIN — ENOXAPARIN SODIUM 40 MILLIGRAM(S): 100 INJECTION SUBCUTANEOUS at 15:32

## 2021-04-22 RX ADMIN — POLYETHYLENE GLYCOL 3350 17 GRAM(S): 17 POWDER, FOR SOLUTION ORAL at 11:21

## 2021-04-22 RX ADMIN — Medication 81 MILLIGRAM(S): at 11:20

## 2021-04-22 RX ADMIN — CHLORHEXIDINE GLUCONATE 15 MILLILITER(S): 213 SOLUTION TOPICAL at 17:09

## 2021-04-22 RX ADMIN — Medication 40 MILLIEQUIVALENT(S): at 21:43

## 2021-04-22 RX ADMIN — PROPOFOL 5.03 MICROGRAM(S)/KG/MIN: 10 INJECTION, EMULSION INTRAVENOUS at 11:53

## 2021-04-22 RX ADMIN — MIDAZOLAM HYDROCHLORIDE 10.9 MG/KG/HR: 1 INJECTION, SOLUTION INTRAMUSCULAR; INTRAVENOUS at 06:55

## 2021-04-22 RX ADMIN — FENTANYL CITRATE 41.9 MICROGRAM(S)/KG/HR: 50 INJECTION INTRAVENOUS at 20:15

## 2021-04-22 RX ADMIN — PROPOFOL 5.03 MICROGRAM(S)/KG/MIN: 10 INJECTION, EMULSION INTRAVENOUS at 21:45

## 2021-04-22 RX ADMIN — MIDODRINE HYDROCHLORIDE 10 MILLIGRAM(S): 2.5 TABLET ORAL at 17:09

## 2021-04-22 RX ADMIN — Medication 50 MILLIEQUIVALENT(S): at 10:26

## 2021-04-22 RX ADMIN — Medication 50 MILLIEQUIVALENT(S): at 07:36

## 2021-04-22 RX ADMIN — Medication 0: at 11:02

## 2021-04-22 RX ADMIN — CHLORHEXIDINE GLUCONATE 15 MILLILITER(S): 213 SOLUTION TOPICAL at 05:05

## 2021-04-22 RX ADMIN — PROPOFOL 5.03 MICROGRAM(S)/KG/MIN: 10 INJECTION, EMULSION INTRAVENOUS at 03:38

## 2021-04-22 RX ADMIN — POTASSIUM PHOSPHATE, MONOBASIC POTASSIUM PHOSPHATE, DIBASIC 83.33 MILLIMOLE(S): 236; 224 INJECTION, SOLUTION INTRAVENOUS at 22:42

## 2021-04-22 RX ADMIN — CHLORHEXIDINE GLUCONATE 1 APPLICATION(S): 213 SOLUTION TOPICAL at 05:05

## 2021-04-22 RX ADMIN — MIDODRINE HYDROCHLORIDE 5 MILLIGRAM(S): 2.5 TABLET ORAL at 05:05

## 2021-04-22 RX ADMIN — FENTANYL CITRATE 25 MICROGRAM(S): 50 INJECTION INTRAVENOUS at 18:52

## 2021-04-22 RX ADMIN — Medication 1000 MILLIGRAM(S): at 11:20

## 2021-04-22 RX ADMIN — Medication 2.5 MILLIGRAM(S): at 09:51

## 2021-04-22 RX ADMIN — PROPOFOL 5.03 MICROGRAM(S)/KG/MIN: 10 INJECTION, EMULSION INTRAVENOUS at 06:54

## 2021-04-22 RX ADMIN — MIDAZOLAM HYDROCHLORIDE 10.9 MG/KG/HR: 1 INJECTION, SOLUTION INTRAMUSCULAR; INTRAVENOUS at 02:55

## 2021-04-22 RX ADMIN — POTASSIUM PHOSPHATE, MONOBASIC POTASSIUM PHOSPHATE, DIBASIC 83.33 MILLIMOLE(S): 236; 224 INJECTION, SOLUTION INTRAVENOUS at 06:25

## 2021-04-22 RX ADMIN — FENTANYL CITRATE 25 MICROGRAM(S): 50 INJECTION INTRAVENOUS at 18:15

## 2021-04-22 NOTE — PROGRESS NOTE ADULT - PROBLEM SELECTOR PLAN 1
2/2 covid-19 PNA; comorbid L pneumothorax. 4/22 CXR: Unchanged chest with advanced infiltrates. Patient remains sedated; POD #1 trach, on pressor + midodrine. + L chest tube . High risk of mortality. Patient's brother/Amin is identified surrogate. Full code.

## 2021-04-22 NOTE — PROGRESS NOTE ADULT - PROBLEM SELECTOR PLAN 7
Patient's brother/Brennan ([phone]) is the identified surrogate. MOLST previously completed and on file: CPR / long term ventilation if needed / long term feeding tube if needed / trial IV fluids / use abx. Patient with high risk of mortality.

## 2021-04-22 NOTE — PROGRESS NOTE ADULT - PROBLEM SELECTOR PLAN 6
2/2 acute illness. Patient POD#1 trach; remains sedated, on pressor. With L chest tube 2/2 pneumothorax. Dependent on ADLs. Plan for open gastrostomy tube 4/23. High risk of mortality.

## 2021-04-22 NOTE — PROGRESS NOTE ADULT - ASSESSMENT
1. Acute hypoxic respiratory failure  2. ARDS 2/2 Covid pneumonia  3. Transaminitis  4. Prediabetes  5. Abnormal TSH    =================== Neuro============================  Alert and oriented x 3 at baseline   intubated and sedated 3/29 on Vent,   Trach 4/22,   Continue Versed to help with vent synchrony with Fentanyl  Added propofol for sedation   Plan is to titrate down Versed and start on Valium for benzo dependance,  Cut down on sedation as tolerated   CT head unremarkable     ================= Cardiovascular==========================  # Hypotension      Increase midodrine to 10 mg Q8  taper off phenylephrine as tolerated       # TACHYCARDIA: recurrent episodes   -Lopressor PNR pushes   -HR in 110's  -continue monitoring     ================- Pulm=================================  #Acute hypoxic respiratory failure: secondary to Covid pneumonia  #ARDS  -was on HFNr then got intubated 3/29  -VC Vent setting : 30/400/60/3  -D-dimer initially elevated on presentation, trending down    - s/p Nimbex and Epifanio at different occasions for vent synchrony  - s/p Pigtail on left chest , removed on 4/15  - Large Pneumothorax was noted on 4/18 on left side, s/p chest tube placement    -Remdesivir was discontinued due to positive antibodies   - Finished Dexamethasone   - prednisone taper, Finished    - Covid PCR negative now.,, D/C isolation       # Bacterial Pneumonia  - stable infiltrate on CXR ,likely developed VAP, Finished ABx Zosyn, meropenem, s/p 1 dose of Vanco  - MRSA negative from 3/26  - Sputum Cx growing few gram negative rods, CRE  - Pulm. dr. Ryan  - ID consult Dr Anand       #Pneumothorax and pneumomediastinum: resolved  -X-ray chest: tiny left apical pneumothorax  -Thoracic surgery following  -s/p Chest tube placed 4/8 pigtail to wall suction.  d/c'd 4/15  -X-ray monitoring daily, PTX resolved      ==================ID===================================  Likely bacterial pneumonia at present  -leukocytosis resolved  -No more fevers, On tylenol PRN only   - Finished Meropenem  - C-procal elevated  -plan as above     ================= Nephro================================  -pitting edema to both lower arms  -on condom cath   -monitor I/Os , good urine output overall  -s/p Albumin x 2 days on 4/10  -Lasix 40mg BID with Albumin 25% Q6 for 48 hours to help with urine output and fluid status.( 4/15) Albumin finished , was D/jennifer on 4/19, urine output decreasing  - Patient can get Lasix as needed   - Metabolic Alkalosis likely due to compensation     =================GI====================================  Transaminitis:   likely secondary to Covid19   - and  on presentation   hepatitis panel -ve  -Improved  -continue to monitor LFT    diet:   NGT (3/29) with tube feed  bowel regimen   Last BM 4/16      ================ Heme==================================  Elevated d-dimer: likely secondary to Covid  -d-dimer 423 on admission  -last D-dimer 2160, trending down   -continue prophylactic Lovenox 40 mg daily    Anemia  Hgb 8.8, no active signs of bleeding  will transfuse  if hgb drops.     =================Endocrine===============================  Prediabetes:    -A1c  5.8  -BS controlled  -continue HSS  -monitor FS while on steroids    Abnormal TSH:  -TSH level noted 0.26,   -repeat TSH 0.37 and Free T4 1.82    ================= Skin/Catheters============================  No rashes. Peripheral IV lines.   RIJ 4/13  RRA 3/29, Removed  RBA 4/20    - =================Prophylaxis =============================  Lovenox for DVT proph : Lovenox 40mg SQ daily  Protonix for  GI proph    ==================GOC==================================   FULL CODE

## 2021-04-22 NOTE — PROGRESS NOTE ADULT - SUBJECTIVE AND OBJECTIVE BOX
Pre Op Note    Diagnosis: Failure to thrive   Procedure: Open Gastrostomy Tube placement   Surgeon: Dr Lerma                           7.8    7.54  )-----------( 317      ( 22 Apr 2021 03:55 )             26.6     04-22    147<H>  |  104  |  14  ----------------------------<  111<H>  3.3<L>   |  42<H>  |  0.20<L>    Ca    7.8<L>      22 Apr 2021 17:30  Phos  1.6     04-22  Mg     2.2     04-22    TPro  5.7<L>  /  Alb  2.2<L>  /  TBili  0.4  /  DBili  x   /  AST  24  /  ALT  21  /  AlkPhos  86  04-22    PT/INR - ( 22 Apr 2021 03:55 )   PT: 13.3 sec;   INR: 1.12 ratio         PTT - ( 22 Apr 2021 03:55 )  PTT:36.4 sec

## 2021-04-22 NOTE — PROGRESS NOTE ADULT - ATTENDING COMMENTS
55 yr old  man , non smoker with  moody 1990s presented 3/14 with x9 days worsening cough, subjective fevers, and SOB, with x2-3 days dysuria and central, non-radiating, constant CP. Admitted to medicine unit  for acute hypoxic respiratory failure secondary to pna from covid-19 infection .     Assessment:  1. Acute hypoxic respiratory failure  2. Covid-19 infection   3. Transaminitis  4. Prediabetes  5. Bilateral pneumothorax  6. Septic shock     Plan   -S/p tracheostomy placement 4/21   -Completed antibiotics  -pat intubated 3/29   -Plan for peg placement tomorrow  -hold anticog preoperatively   -thoracic f/u noted   -adjust vent as per ABG, repeat ABG improved  -permissive hypercapnia   -Will give diamox as patient with metabolic alkalosis  -PTX  improved post chest tube placement, place chest tube to water seal  -chest tube removed 4/15  -Cont. versed/fentanyl as patient asynchronous with the vent  -Cont. midodrine  -monitor biomarkers daily, trending down   -Tube feedings, bowel regimen  -dvt/gi prophy  -hemodynamic monitoring   -Prognosis is guarded

## 2021-04-22 NOTE — PROGRESS NOTE ADULT - SUBJECTIVE AND OBJECTIVE BOX
55M POD1 tracheostomy for acute respirator failure s/p covid PNA.    Seen at bedside. No acute events overnight. Saturation mid 90s with FiO2 65 and peaks in 30s. CXray shows trach in appropriate position. Continued tachycardic to 110s overnight. Trach site without significant swelling or bleeding.    Vital Signs Last 24 Hrs  T(C): 36.1 (22 Apr 2021 04:00), Max: 37.3 (21 Apr 2021 11:30)  T(F): 96.9 (22 Apr 2021 04:00), Max: 99.2 (21 Apr 2021 11:30)  HR: 118 (22 Apr 2021 07:00) (96 - 130)  BP: 122/67 (21 Apr 2021 13:36) (122/67 - 122/67)  BP(mean): --  RR: 30 (22 Apr 2021 07:00) (14 - 30)  SpO2: 95% (22 Apr 2021 07:00) (91% - 98%)  Gen: sedated, trached  HEENT: trach in position, no significant bleeding or swelling, surgicell in place mild saturation  Resp: on vent,  5 30 65  Abd: nt nd  Neuro: GCS15 AOx3                          7.8    7.54  )-----------( 317      ( 22 Apr 2021 03:55 )             26.6     04-22    149<H>  |  101  |  19<H>  ----------------------------<  100<H>  3.4<L>   |  >45<HH>  |  0.27<L>    Ca    8.1<L>      22 Apr 2021 03:55  Phos  1.8     04-22  Mg     2.2     04-22    TPro  5.7<L>  /  Alb  2.2<L>  /  TBili  0.4  /  DBili  x   /  AST  24  /  ALT  21  /  AlkPhos  86  04-22

## 2021-04-22 NOTE — PROGRESS NOTE ADULT - SUBJECTIVE AND OBJECTIVE BOX
INTERVAL HPI/OVERNIGHT EVENTS: No significant event over night, Low potassium and phosphorus in Am , Replacing     PRESSORS: [ ] YES [x] NO  WHICH:    ANTIBIOTICS:                  DATE STARTED:  ANTIBIOTICS:                  DATE STARTED:  ANTIBIOTICS:                  DATE STARTED:    Antimicrobial:    Cardiovascular:  midodrine. 10 milliGRAM(s) Oral three times a day  phenylephrine    Infusion 1.5 MICROgram(s)/kG/Min IV Continuous <Continuous>    Pulmonary:  ALBUTerol    90 MICROgram(s) HFA Inhaler 2 Puff(s) Inhalation every 6 hours PRN    Hematalogic:  aspirin  chewable 81 milliGRAM(s) Oral daily    Other:  acetaminophen    Suspension .. 650 milliGRAM(s) Oral every 6 hours PRN  artificial  tears Solution 1 Drop(s) Both EYES every 4 hours PRN  ascorbic acid 1000 milliGRAM(s) Oral daily  chlorhexidine 0.12% Liquid 15 milliLiter(s) Oral Mucosa two times a day  chlorhexidine 2% Cloths 1 Application(s) Topical <User Schedule>  fentaNYL   Infusion 5 MICROgram(s)/kG/Hr IV Continuous <Continuous>  insulin lispro (ADMELOG) corrective regimen sliding scale   SubCutaneous every 6 hours  midazolam Infusion 0.13 mG/kG/Hr IV Continuous <Continuous>  pantoprazole  Injectable 40 milliGRAM(s) IV Push daily  polyethylene glycol 3350 17 Gram(s) Oral daily  propofol Infusion 10 MICROgram(s)/kG/Min IV Continuous <Continuous>  senna 2 Tablet(s) Oral at bedtime  sodium chloride 0.9% lock flush 10 milliLiter(s) IV Push every 1 hour PRN    acetaminophen    Suspension .. 650 milliGRAM(s) Oral every 6 hours PRN  ALBUTerol    90 MICROgram(s) HFA Inhaler 2 Puff(s) Inhalation every 6 hours PRN  artificial  tears Solution 1 Drop(s) Both EYES every 4 hours PRN  ascorbic acid 1000 milliGRAM(s) Oral daily  aspirin  chewable 81 milliGRAM(s) Oral daily  chlorhexidine 0.12% Liquid 15 milliLiter(s) Oral Mucosa two times a day  chlorhexidine 2% Cloths 1 Application(s) Topical <User Schedule>  fentaNYL   Infusion 5 MICROgram(s)/kG/Hr IV Continuous <Continuous>  insulin lispro (ADMELOG) corrective regimen sliding scale   SubCutaneous every 6 hours  midazolam Infusion 0.13 mG/kG/Hr IV Continuous <Continuous>  midodrine. 10 milliGRAM(s) Oral three times a day  pantoprazole  Injectable 40 milliGRAM(s) IV Push daily  phenylephrine    Infusion 1.5 MICROgram(s)/kG/Min IV Continuous <Continuous>  polyethylene glycol 3350 17 Gram(s) Oral daily  propofol Infusion 10 MICROgram(s)/kG/Min IV Continuous <Continuous>  senna 2 Tablet(s) Oral at bedtime  sodium chloride 0.9% lock flush 10 milliLiter(s) IV Push every 1 hour PRN    Drug Dosing Weight  Height (cm): 167.6 (14 Mar 2021 12:03)  Weight (kg): 83.869 (14 Mar 2021 12:03)  BMI (kg/m2): 29.9 (14 Mar 2021 12:03)  BSA (m2): 1.93 (14 Mar 2021 12:03)    CENTRAL LINE: [x ] YES [ ] NO  LOCATION: [facility]   DATE INSERTED: 4/13  REMOVE: [ ] YES [ ] NO  EXPLAIN:    RIVERA: [ ] YES [x ] NO    DATE INSERTED:  REMOVE:  [ ] YES [ ] NO  EXPLAIN:    A-LINE:  [ x] YES [ ] NO  LOCATION: [facility]   DATE INSERTED: 4/20  REMOVE:  [ ] YES [ ] NO  EXPLAIN:    PMH -reviewed admission note, no change since admission  PAST MEDICAL & SURGICAL HISTORY:  No pertinent past medical history    S/P moody  1990s    S/P appendectomy  1990s        ICU Vital Signs Last 24 Hrs  T(C): 36.8 (22 Apr 2021 07:45), Max: 37.3 (21 Apr 2021 13:36)  T(F): 98.2 (22 Apr 2021 07:45), Max: 99.2 (21 Apr 2021 13:36)  HR: 120 (22 Apr 2021 11:30) (96 - 131)  BP: 122/67 (21 Apr 2021 13:36) (122/67 - 122/67)  ABP: 117/70 (22 Apr 2021 11:30) (82/47 - 124/66)  ABP(mean): 90 (22 Apr 2021 11:30) (61 - 90)  RR: 31 (22 Apr 2021 11:30) (14 - 37)  SpO2: 97% (22 Apr 2021 11:30) (83% - 98%)      ABG - ( 22 Apr 2021 11:37 )  pH, Arterial: 7.42  pH, Blood: x     /  pCO2: 80    /  pO2: 107   / HCO3: 52    / Base Excess: 22.4  /  SaO2: 99                    04-21 @ 07:01  -  04-22 @ 07:00  --------------------------------------------------------  IN: 2548.5 mL / OUT: 970 mL / NET: 1578.5 mL        Mode: AC/ CMV (Assist Control/ Continuous Mandatory Ventilation)  RR (machine): 30  TV (machine): 350  FiO2: 65  PEEP: 5  ITime: 0.7  MAP: 13  PIP: 36      PHYSICAL EXAM:    GENERAL: NAD, on Vent, sedated  HEAD: Normocephalic, atraumatic  EYES:  Dry eyes, conjunctivae/sclera clear, equal pupils  NECK: Tracheostomy tube in place  NERVOUS SYSTEM:  sedated  CHEST/LUNG: B/L crackles (R>L), equal lung expansion  HEART: Regular rate and rhythm; No murmurs, rubs, or gallops, tachycardia  ABDOMEN: Soft, Nontender, Nondistended; Bowel sounds present  EXTREMITIES:  b/l upper extremities edema +2,  no edema on lower legs. No clubbing, cyanosis   SKIN: No rashes  + capillary refill <2 sec        LABS:  CBC Full  -  ( 22 Apr 2021 03:55 )  WBC Count : 7.54 K/uL  RBC Count : 2.35 M/uL  Hemoglobin : 7.8 g/dL  Hematocrit : 26.6 %  Platelet Count - Automated : 317 K/uL  Mean Cell Volume : 113.2 fl  Mean Cell Hemoglobin : 33.2 pg  Mean Cell Hemoglobin Concentration : 29.3 gm/dL  Auto Neutrophil # : 5.02 K/uL  Auto Lymphocyte # : 1.30 K/uL  Auto Monocyte # : 0.51 K/uL  Auto Eosinophil # : 0.20 K/uL  Auto Basophil # : 0.04 K/uL  Auto Neutrophil % : 66.6 %  Auto Lymphocyte % : 17.2 %  Auto Monocyte % : 6.8 %  Auto Eosinophil % : 2.7 %  Auto Basophil % : 0.5 %    04-22    149<H>  |  101  |  19<H>  ----------------------------<  100<H>  3.4<L>   |  >45<HH>  |  0.27<L>    Ca    8.1<L>      22 Apr 2021 03:55  Phos  1.8     04-22  Mg     2.2     04-22    TPro  5.7<L>  /  Alb  2.2<L>  /  TBili  0.4  /  DBili  x   /  AST  24  /  ALT  21  /  AlkPhos  86  04-22    PT/INR - ( 22 Apr 2021 03:55 )   PT: 13.3 sec;   INR: 1.12 ratio         PTT - ( 22 Apr 2021 03:55 )  PTT:36.4 sec        RADIOLOGY & ADDITIONAL STUDIES REVIEWED:  Yes    [ ]GOALS OF CARE DISCUSSION WITH PATIENT/FAMILY/PROXY:    CRITICAL CARE TIME SPENT: 35 minutes

## 2021-04-22 NOTE — PROGRESS NOTE ADULT - SUBJECTIVE AND OBJECTIVE BOX
Pt is sedated, ventilated - Trach placed. Sat 94%.     INTERVAL HPI/OVERNIGHT EVENTS:      VITAL SIGNS:  T(F): 99.2 (04-22-21 @ 12:00)  HR: 117 (04-22-21 @ 12:15)  BP: 122/67 (04-21-21 @ 13:36)  RR: 31 (04-22-21 @ 12:15)  SpO2: 94% (04-22-21 @ 12:15)  Wt(kg): --  I&O's Detail    21 Apr 2021 07:01  -  22 Apr 2021 07:00  --------------------------------------------------------  IN:    Enteral Tube Flush: 50 mL    FentaNYL: 1008 mL    Glucerna 1.5: 240 mL    IV PiggyBack: 416.5 mL    IV PiggyBack: 100 mL    Midazolam: 55 mL    Midazolam: 224 mL    Propofol: 25 mL    Propofol: 430 mL  Total IN: 2548.5 mL    OUT:    Chest Tube (mL): 60 mL    Incontinent per Condom Catheter (mL): 910 mL    Phenylephrine: 0 mL  Total OUT: 970 mL    Total NET: 1578.5 mL      22 Apr 2021 07:01  -  22 Apr 2021 12:23  --------------------------------------------------------  IN:    Enteral Tube Flush: 100 mL    FentaNYL: 109 mL    Glucerna 1.5: 100 mL    IV PiggyBack: 83.3 mL    IV PiggyBack: 200 mL    Midazolam: 35 mL    Propofol: 80 mL  Total IN: 707.3 mL    OUT:    Phenylephrine: 0 mL  Total OUT: 0 mL    Total NET: 707.3 mL        Mode: AC/ CMV (Assist Control/ Continuous Mandatory Ventilation)  RR (machine): 28  TV (machine): 430  FiO2: 65  PEEP: 5  ITime: 1  MAP: 14  PIP: 44        REVIEW OF SYSTEMS:   Unable to assess - sedated.     PHYSICAL EXAM:    Constitutional: Well developed and nourished  Eyes: Perrla  ENMT: normal  Neck: supple  Respiratory: Trach   Cardiovascular: S1 S2 regular  Gastrointestinal: Soft, Non tender  Extremities: No edema  Vascular: normal  Neurological: sedated.   Musculoskeletal: Normal      MEDICATIONS  (STANDING):  ascorbic acid 1000 milliGRAM(s) Oral daily  aspirin  chewable 81 milliGRAM(s) Oral daily  chlorhexidine 0.12% Liquid 15 milliLiter(s) Oral Mucosa two times a day  chlorhexidine 2% Cloths 1 Application(s) Topical <User Schedule>  enoxaparin Injectable 40 milliGRAM(s) SubCutaneous once  fentaNYL   Infusion 5 MICROgram(s)/kG/Hr (41.9 mL/Hr) IV Continuous <Continuous>  insulin lispro (ADMELOG) corrective regimen sliding scale   SubCutaneous every 6 hours  midazolam Infusion 0.13 mG/kG/Hr (10.9 mL/Hr) IV Continuous <Continuous>  midodrine. 10 milliGRAM(s) Oral three times a day  pantoprazole  Injectable 40 milliGRAM(s) IV Push daily  phenylephrine    Infusion 1.5 MICROgram(s)/kG/Min (23.6 mL/Hr) IV Continuous <Continuous>  polyethylene glycol 3350 17 Gram(s) Oral daily  propofol Infusion 10 MICROgram(s)/kG/Min (5.03 mL/Hr) IV Continuous <Continuous>  senna 2 Tablet(s) Oral at bedtime    MEDICATIONS  (PRN):  acetaminophen    Suspension .. 650 milliGRAM(s) Oral every 6 hours PRN Temp greater or equal to 38C (100.4F)  ALBUTerol    90 MICROgram(s) HFA Inhaler 2 Puff(s) Inhalation every 6 hours PRN Shortness of Breath and/or Wheezing  artificial  tears Solution 1 Drop(s) Both EYES every 4 hours PRN Dry Eyes  sodium chloride 0.9% lock flush 10 milliLiter(s) IV Push every 1 hour PRN Pre/post blood products, medications, blood draw, and to maintain line patency      Allergies    No Known Allergies    Intolerances        LABS:                        7.8    7.54  )-----------( 317      ( 22 Apr 2021 03:55 )             26.6     04-22    149<H>  |  101  |  19<H>  ----------------------------<  100<H>  3.4<L>   |  >45<HH>  |  0.27<L>    Ca    8.1<L>      22 Apr 2021 03:55  Phos  1.8     04-22  Mg     2.2     04-22    TPro  5.7<L>  /  Alb  2.2<L>  /  TBili  0.4  /  DBili  x   /  AST  24  /  ALT  21  /  AlkPhos  86  04-22    PT/INR - ( 22 Apr 2021 03:55 )   PT: 13.3 sec;   INR: 1.12 ratio         PTT - ( 22 Apr 2021 03:55 )  PTT:36.4 sec    ABG - ( 22 Apr 2021 11:37 )  pH, Arterial: 7.42  pH, Blood: x     /  pCO2: 80    /  pO2: 107   / HCO3: 52    / Base Excess: 22.4  /  SaO2: 99                    CAPILLARY BLOOD GLUCOSE      POCT Blood Glucose.: 93 mg/dL (22 Apr 2021 10:30)  POCT Blood Glucose.: 103 mg/dL (22 Apr 2021 05:15)  POCT Blood Glucose.: 91 mg/dL (21 Apr 2021 23:11)  POCT Blood Glucose.: 94 mg/dL (21 Apr 2021 16:24)    pro-bnp -- 04-22 @ 03:55     d-dimer 1367  04-22 @ 03:55  pro-bnp -- 04-21 @ 03:58     d-dimer 2160  04-21 @ 03:58  pro-bnp -- 04-20 @ 03:50     d-dimer 2911  04-20 @ 03:50  pro-bnp -- 04-19 @ 04:14     d-dimer 2394  04-19 @ 04:14  pro-bnp -- 04-18 @ 06:39     d-dimer 1778  04-18 @ 06:39      RADIOLOGY & ADDITIONAL TESTS:    CXR:  IMPRESSION: Unchanged chest with advanced infiltrates.    Ct scan chest:    ekg;    echo:

## 2021-04-22 NOTE — PROGRESS NOTE ADULT - PROBLEM SELECTOR PLAN 3
2/2 covid-19; comorbid L pneumothorax. 2/2 covid-19 PNA; comorbid L pneumothorax. 4/22 CXR: Unchanged chest with advanced infiltrates. Patient remains sedated; POD #1 trach, on pressor + midodrine. + L chest tube. High risk of mortality. Full code.

## 2021-04-22 NOTE — PROGRESS NOTE ADULT - SUBJECTIVE AND OBJECTIVE BOX
OVERNIGHT EVENTS: POD #1 tracheostomy; plan for open gastrostomy tube 4/23/2021    Present Symptoms:   Review of Systems: Unable to obtain- patient sedated, + trach    MEDICATIONS  (STANDING):  ascorbic acid 1000 milliGRAM(s) Oral daily  aspirin  chewable 81 milliGRAM(s) Oral daily  chlorhexidine 0.12% Liquid 15 milliLiter(s) Oral Mucosa two times a day  chlorhexidine 2% Cloths 1 Application(s) Topical <User Schedule>  enoxaparin Injectable 40 milliGRAM(s) SubCutaneous once  fentaNYL   Infusion 5 MICROgram(s)/kG/Hr (41.9 mL/Hr) IV Continuous <Continuous>  insulin lispro (ADMELOG) corrective regimen sliding scale   SubCutaneous every 6 hours  midazolam Infusion 0.13 mG/kG/Hr (10.9 mL/Hr) IV Continuous <Continuous>  midodrine. 10 milliGRAM(s) Oral three times a day  pantoprazole  Injectable 40 milliGRAM(s) IV Push daily  phenylephrine    Infusion 1.5 MICROgram(s)/kG/Min (23.6 mL/Hr) IV Continuous <Continuous>  polyethylene glycol 3350 17 Gram(s) Oral daily  propofol Infusion 10 MICROgram(s)/kG/Min (5.03 mL/Hr) IV Continuous <Continuous>  senna 2 Tablet(s) Oral at bedtime    MEDICATIONS  (PRN):  acetaminophen    Suspension .. 650 milliGRAM(s) Oral every 6 hours PRN Temp greater or equal to 38C (100.4F)  ALBUTerol    90 MICROgram(s) HFA Inhaler 2 Puff(s) Inhalation every 6 hours PRN Shortness of Breath and/or Wheezing  artificial  tears Solution 1 Drop(s) Both EYES every 4 hours PRN Dry Eyes  sodium chloride 0.9% lock flush 10 milliLiter(s) IV Push every 1 hour PRN Pre/post blood products, medications, blood draw, and to maintain line patency      PHYSICAL EXAM:  Vital Signs Last 24 Hrs  T(C): 37.3 (22 Apr 2021 12:00), Max: 37.3 (22 Apr 2021 12:00)  T(F): 99.2 (22 Apr 2021 12:00), Max: 99.2 (22 Apr 2021 12:00)  HR: 122 (22 Apr 2021 13:15) (96 - 131)  BP: --  BP(mean): --  RR: 37 (22 Apr 2021 13:15) (14 - 37)  SpO2: 93% (22 Apr 2021 13:15) (83% - 98%)  General: Patient sedated, s/p trach, on pressor. Dependent on ADLs. + tachypnea    Karnofsky Performance Score/Palliative Performance Status Version2:     10%    HEENT:  ET tube   Lungs: tachypnea, +trach. Continues fentanyl, versed, propofol. + L chest tube  CV: tachycardia, on pressor + midodrine  GI:   incontinent, NGT  :  external catheter  Musculoskeletal: patient sedated/intubated, dependent on ADLs  Skin: unable to assess   Neuro:  patient sedated/intubated, dependent on ADLs  Oral intake ability: unable/only mouth care   Diet: NPO; TF via NGT    LABS:                          7.8    7.54  )-----------( 317      ( 22 Apr 2021 03:55 )             26.6     04-22    149<H>  |  101  |  19<H>  ----------------------------<  100<H>  3.4<L>   |  >45<HH>  |  0.27<L>    Ca    8.1<L>      22 Apr 2021 03:55  Phos  1.8     04-22  Mg     2.2     04-22    TPro  5.7<L>  /  Alb  2.2<L>  /  TBili  0.4  /  DBili  x   /  AST  24  /  ALT  21  /  AlkPhos  86  04-22        RADIOLOGY & ADDITIONAL STUDIES:  < from: Xray Chest 1 View- PORTABLE-Routine (Xray Chest 1 View- PORTABLE-Routine in AM.) (04.22.21 @ 09:23) >  EXAM:  XR CHEST PORTABLE ROUTINE 1V                        PROCEDURE DATE:  04/22/2021    INTERPRETATION:  AP chest on April 22, 2021 at 8:25 AM. Patient requires tracheostomy.  Heart magnified by technique.  Tracheostomy, nasogastrictube, and right jugular line remain. Catheter left chest tube remains.  No visible pneumothorax.  Fairly advanced diffuse bilateral infiltrates again noted.  Chest is similar to April 21.    IMPRESSION: Unchanged chest with advanced infiltrates.  < end of copied text >      ADVANCE DIRECTIVES: MOLST previously completed and on file: CPR / long term ventilation if needed / long term feeding tube if needed / trial IV fluids / use abx   OVERNIGHT EVENTS: POD #1 tracheostomy; plan for open gastrostomy tube 4/23/2021    Present Symptoms:   Review of Systems: Unable to obtain- patient sedated, + trach    MEDICATIONS  (STANDING):  ascorbic acid 1000 milliGRAM(s) Oral daily  aspirin  chewable 81 milliGRAM(s) Oral daily  chlorhexidine 0.12% Liquid 15 milliLiter(s) Oral Mucosa two times a day  chlorhexidine 2% Cloths 1 Application(s) Topical <User Schedule>  enoxaparin Injectable 40 milliGRAM(s) SubCutaneous once  fentaNYL   Infusion 5 MICROgram(s)/kG/Hr (41.9 mL/Hr) IV Continuous <Continuous>  insulin lispro (ADMELOG) corrective regimen sliding scale   SubCutaneous every 6 hours  midazolam Infusion 0.13 mG/kG/Hr (10.9 mL/Hr) IV Continuous <Continuous>  midodrine. 10 milliGRAM(s) Oral three times a day  pantoprazole  Injectable 40 milliGRAM(s) IV Push daily  phenylephrine    Infusion 1.5 MICROgram(s)/kG/Min (23.6 mL/Hr) IV Continuous <Continuous>  polyethylene glycol 3350 17 Gram(s) Oral daily  propofol Infusion 10 MICROgram(s)/kG/Min (5.03 mL/Hr) IV Continuous <Continuous>  senna 2 Tablet(s) Oral at bedtime    MEDICATIONS  (PRN):  acetaminophen    Suspension .. 650 milliGRAM(s) Oral every 6 hours PRN Temp greater or equal to 38C (100.4F)  ALBUTerol    90 MICROgram(s) HFA Inhaler 2 Puff(s) Inhalation every 6 hours PRN Shortness of Breath and/or Wheezing  artificial  tears Solution 1 Drop(s) Both EYES every 4 hours PRN Dry Eyes  sodium chloride 0.9% lock flush 10 milliLiter(s) IV Push every 1 hour PRN Pre/post blood products, medications, blood draw, and to maintain line patency      PHYSICAL EXAM:  Vital Signs Last 24 Hrs  T(C): 37.3 (22 Apr 2021 12:00), Max: 37.3 (22 Apr 2021 12:00)  T(F): 99.2 (22 Apr 2021 12:00), Max: 99.2 (22 Apr 2021 12:00)  HR: 122 (22 Apr 2021 13:15) (96 - 131)  BP: --  BP(mean): --  RR: 37 (22 Apr 2021 13:15) (14 - 37)  SpO2: 93% (22 Apr 2021 13:15) (83% - 98%)  General: Patient sedated, s/p trach, on pressor. Dependent on ADLs. + tachypnea    Karnofsky Performance Score/Palliative Performance Status Version2:     10%    HEENT:  +trach  Lungs: tachypnea, +trach. Continues fentanyl, versed, propofol. + L chest tube  CV: tachycardia, on pressor + midodrine  GI:   incontinent, NGT  :  external catheter  Musculoskeletal: patient sedated/intubated, dependent on ADLs  Skin: unable to assess   Neuro:  patient sedated/intubated, dependent on ADLs  Oral intake ability: unable/only mouth care   Diet: NPO; TF via NGT    LABS:                          7.8    7.54  )-----------( 317      ( 22 Apr 2021 03:55 )             26.6     04-22    149<H>  |  101  |  19<H>  ----------------------------<  100<H>  3.4<L>   |  >45<HH>  |  0.27<L>    Ca    8.1<L>      22 Apr 2021 03:55  Phos  1.8     04-22  Mg     2.2     04-22    TPro  5.7<L>  /  Alb  2.2<L>  /  TBili  0.4  /  DBili  x   /  AST  24  /  ALT  21  /  AlkPhos  86  04-22        RADIOLOGY & ADDITIONAL STUDIES:  < from: Xray Chest 1 View- PORTABLE-Routine (Xray Chest 1 View- PORTABLE-Routine in AM.) (04.22.21 @ 09:23) >  EXAM:  XR CHEST PORTABLE ROUTINE 1V                        PROCEDURE DATE:  04/22/2021    INTERPRETATION:  AP chest on April 22, 2021 at 8:25 AM. Patient requires tracheostomy.  Heart magnified by technique.  Tracheostomy, nasogastrictube, and right jugular line remain. Catheter left chest tube remains.  No visible pneumothorax.  Fairly advanced diffuse bilateral infiltrates again noted.  Chest is similar to April 21.    IMPRESSION: Unchanged chest with advanced infiltrates.  < end of copied text >      ADVANCE DIRECTIVES: MOLST previously completed and on file: CPR / long term ventilation if needed / long term feeding tube if needed / trial IV fluids / use abx

## 2021-04-22 NOTE — PROGRESS NOTE ADULT - ASSESSMENT
Assessment and Recommendation:   Problem/Recommendation - 1:  Problem: COVID-19. Recommendation: isolation precautions  Cont mechanical ventilation - S.P trach.   Taper FIO2 as tolerated  Wean as tolerated  Pulmonary toilet  Supplemental nutrition  ICU management.   Monitor oxygen sat  Monitor LFT, LDH, CRP, D-Dimer, Ferritin and procalcitonin  Vit C, D and zinc supp  Montelukast 10 mgs po Qhs  Daily CXR   DVT and GI PPX     Problem/Recommendation - 2:  ·  Problem: UTI (urinary tract infection).  Recommendation: F.U cultures   Off Antibiotics.     Problem/Recommendation - 3:  ·  Problem: Chest pain.  Recommendation: likely related to Covid-19 infection.     Problem/Recommendation - 4:  ·  Problem: Transaminitis.  Recommendation: monitor LFTs  GI F/U     Problem/Recommendation - 5:  ·  Problem: Pneumothorax.  Recommendation: L pigtail   Thoracic sx follow up  Daily  CXR   Management per ICU

## 2021-04-22 NOTE — PHYSICAL THERAPY INITIAL EVALUATION ADULT - IMPAIRMENTS FOUND, PT EVAL
aerobic capacity/endurance/arousal, attention, and cognition/gait, locomotion, and balance/gross motor/muscle strength/poor safety awareness/ROM/tone/ventilation and respiration/gas exchange

## 2021-04-22 NOTE — PROGRESS NOTE ADULT - ASSESSMENT
55yMale with failure to thrive to go for Open Gastrostomy Tube placement      -NPO after midnight, hold tube feeds   -Pre Op Labs in AM   -Type and Screen in AM   -PT/PTT/INR in AM   -F/u medical clearance, please note in chart   -Consent to be obtained

## 2021-04-22 NOTE — PROGRESS NOTE ADULT - SUBJECTIVE AND OBJECTIVE BOX
Patient is a 55y old  Male who presents with a chief complaint of SOB (21 Apr 2021 16:24)    pt seen in icu [ x ], reg med floor [   ], bed [ x ], chair at bedside [   ], a+o x3 [  ], sedated [x  ],  nad [x  ]    andrea [ x ], ngt feed [x  ], et tube [ x ], cent line [x  ],        Allergies    No Known Allergies        Vitals    T(F): 96.9 (04-22-21 @ 04:00), Max: 99.2 (04-21-21 @ 11:30)  HR: 114 (04-22-21 @ 06:30) (96 - 130)  BP: 122/67 (04-21-21 @ 13:36) (122/67 - 122/67)  RR: 30 (04-22-21 @ 06:30) (14 - 31)  SpO2: 92% (04-22-21 @ 06:30) (91% - 98%)  Wt(kg): --  CAPILLARY BLOOD GLUCOSE      POCT Blood Glucose.: 103 mg/dL (22 Apr 2021 05:15)      Labs                          7.8    7.54  )-----------( 317      ( 22 Apr 2021 03:55 )             26.6       04-22    149<H>  |  101  |  19<H>  ----------------------------<  100<H>  3.4<L>   |  >45<HH>  |  0.27<L>    Ca    8.1<L>      22 Apr 2021 03:55  Phos  1.8     04-22  Mg     2.2     04-22    TPro  5.7<L>  /  Alb  2.2<L>  /  TBili  0.4  /  DBili  x   /  AST  24  /  ALT  21  /  AlkPhos  86  04-22            .Sputum Sputum  04-16 @ 04:42   Moderate Enterobacter aerogenes (Carbapenem Resistant)  Normal Respiratory Monserrat present  --  Enterobacter aerogenes (Carbapenem Resistant)      .Urine Clean Catch (Midstream)  03-15 @ 00:52   No growth  --  --          Radiology Results      Meds    MEDICATIONS  (STANDING):  ascorbic acid 1000 milliGRAM(s) Oral daily  aspirin  chewable 81 milliGRAM(s) Oral daily  chlorhexidine 0.12% Liquid 15 milliLiter(s) Oral Mucosa two times a day  chlorhexidine 2% Cloths 1 Application(s) Topical <User Schedule>  fentaNYL   Infusion 5 MICROgram(s)/kG/Hr (41.9 mL/Hr) IV Continuous <Continuous>  insulin lispro (ADMELOG) corrective regimen sliding scale   SubCutaneous every 6 hours  midazolam Infusion 0.13 mG/kG/Hr (10.9 mL/Hr) IV Continuous <Continuous>  midodrine. 5 milliGRAM(s) Oral three times a day  pantoprazole  Injectable 40 milliGRAM(s) IV Push daily  phenylephrine    Infusion 1.5 MICROgram(s)/kG/Min (23.6 mL/Hr) IV Continuous <Continuous>  polyethylene glycol 3350 17 Gram(s) Oral daily  potassium chloride  20 mEq/100 mL IVPB 20 milliEquivalent(s) IV Intermittent every 2 hours  propofol Infusion 10 MICROgram(s)/kG/Min (5.03 mL/Hr) IV Continuous <Continuous>  senna 2 Tablet(s) Oral at bedtime      MEDICATIONS  (PRN):  acetaminophen    Suspension .. 650 milliGRAM(s) Oral every 6 hours PRN Temp greater or equal to 38C (100.4F)  ALBUTerol    90 MICROgram(s) HFA Inhaler 2 Puff(s) Inhalation every 6 hours PRN Shortness of Breath and/or Wheezing  artificial  tears Solution 1 Drop(s) Both EYES every 4 hours PRN Dry Eyes  sodium chloride 0.9% lock flush 10 milliLiter(s) IV Push every 1 hour PRN Pre/post blood products, medications, blood draw, and to maintain line patency      Physical Exam    Neuro :  no focal deficits  Respiratory: CTA B/L  CV: RRR, S1S2, no murmurs,   Abdominal: Soft, NT, ND +BS,  Extremities: No edema, + peripheral pulses      ASSESSMENT    Hypoxemia 2nd to covid pna   transaminitis  prediabetes  h/o appendectomy  cholecystectomy        PLAN    contact and airborne isolation  d/c remdesevir given covid ab positive noted   completed dexamethasone   started pulse steroids for 3 days - 250mg solumedrol bid now tapered off 4/14/21  cont asa, vit c,    cont albuterol inhaler   pulm f/u  procalcitonin, D-dimer, crp, ldh, ferritin, lactate noted ,    tmx 100.3  cont tylenol prn,   cont robitussin prn   pt on fentanyl, phenylephrine, midazolam drip  s/p intubation 3/29/21  O2 sat (90% - 100%) mech vent 60%  O2 via mech vent and taper fio2 as tolerated   vent mgmt as per icu  xray 3/19/21 with pneumomediastinum  rept cxr with New trace right apical pneumothorax. New mild left apical pneumothorax. Grossly stable small pneumomediastinum.  Soft tissue emphysema at the neck bases bilaterally. Grossly stable bilateral pulmonary infiltrates noted.   cxr 2/24 with No evidence of pneumothorax can be appreciated on the available image. This may be related to patient positioning. Evidence of pneumomediastinum and subcutaneous emphysema in the lower neck is again noted. There are patchy bibasilar infiltrates and elevated right hemidiaphragm noted.   cxr 3/29/21 with No significant change bilateral infiltrates. There is a small simple left apical pneumothorax. No significant pleural effusion. Bilateral subcutaneous emphysema similar to prior.   pt intubated 6AM 3/29/21,   XRs on 7AM and 5PM with no obvious increase of ptx  cxr 4/4/21 with Improving bilateral airspace disease noted    cxr 4/8/21 with Small left pneumothorax noted.  thoracic surg f/u   s/p L chest tube replacement 4/18/21 for recurrence of left pneumothorax   cxr 4/20/21 with Unchanged advanced infiltrates and catheter left chest tube noted   CXR 4/11/21 with : No interval change compared to one day prior. No pneumothorax noted.   Daily CXR  Monitor O2 status   pt for trach and peg today  id f/u   No need for further antibiotics as patient completed course of Meropenem  leukocytosis resolved  speutum cx with Enterobacter aerogenes (Carbapenem Resistant) noted above  andrea   lispro ss   prognosis poor  cont current meds  mgmt as per icu      Patient is a 55y old  Male who presents with a chief complaint of SOB (21 Apr 2021 16:24)    pt seen in icu [ x ], reg med floor [   ], bed [ x ], chair at bedside [   ], a+o x3 [  ], sedated [x  ],  nad [x  ]    andrea [ x ], ngt feed [x  ], et tube [ x ], cent line [x  ],        Allergies    No Known Allergies        Vitals    T(F): 96.9 (04-22-21 @ 04:00), Max: 99.2 (04-21-21 @ 11:30)  HR: 114 (04-22-21 @ 06:30) (96 - 130)  BP: 122/67 (04-21-21 @ 13:36) (122/67 - 122/67)  RR: 30 (04-22-21 @ 06:30) (14 - 31)  SpO2: 92% (04-22-21 @ 06:30) (91% - 98%)  Wt(kg): --  CAPILLARY BLOOD GLUCOSE      POCT Blood Glucose.: 103 mg/dL (22 Apr 2021 05:15)      Labs                          7.8    7.54  )-----------( 317      ( 22 Apr 2021 03:55 )             26.6       04-22    149<H>  |  101  |  19<H>  ----------------------------<  100<H>  3.4<L>   |  >45<HH>  |  0.27<L>    Ca    8.1<L>      22 Apr 2021 03:55  Phos  1.8     04-22  Mg     2.2     04-22    TPro  5.7<L>  /  Alb  2.2<L>  /  TBili  0.4  /  DBili  x   /  AST  24  /  ALT  21  /  AlkPhos  86  04-22            .Sputum Sputum  04-16 @ 04:42   Moderate Enterobacter aerogenes (Carbapenem Resistant)  Normal Respiratory Monserrat present  --  Enterobacter aerogenes (Carbapenem Resistant)      .Urine Clean Catch (Midstream)  03-15 @ 00:52   No growth  --  --          Radiology Results      < from: Xray Chest 1 View- PORTABLE-Urgent (Xray Chest 1 View- PORTABLE-Urgent .) (04.21.21 @ 19:31) >  Tracheotomy tube in place.  RIGHT IJ catheter tip in SVC.  LEFT multi-sidehole pigtail catheter overlies LEFT lower hemithorax.  The lungs show unchanged bilateral  multifocal and diffuse ill-defined airspace opacities.. No pneumothorax.    The heart and mediastinum are within normal limits.    Visualized osseous structures are intact.      IMPRESSION:   No evidence of active chest disease.    Tracheostomy tube in place otherwise no significant change.    < end of copied text >      Meds    MEDICATIONS  (STANDING):  ascorbic acid 1000 milliGRAM(s) Oral daily  aspirin  chewable 81 milliGRAM(s) Oral daily  chlorhexidine 0.12% Liquid 15 milliLiter(s) Oral Mucosa two times a day  chlorhexidine 2% Cloths 1 Application(s) Topical <User Schedule>  fentaNYL   Infusion 5 MICROgram(s)/kG/Hr (41.9 mL/Hr) IV Continuous <Continuous>  insulin lispro (ADMELOG) corrective regimen sliding scale   SubCutaneous every 6 hours  midazolam Infusion 0.13 mG/kG/Hr (10.9 mL/Hr) IV Continuous <Continuous>  midodrine. 5 milliGRAM(s) Oral three times a day  pantoprazole  Injectable 40 milliGRAM(s) IV Push daily  phenylephrine    Infusion 1.5 MICROgram(s)/kG/Min (23.6 mL/Hr) IV Continuous <Continuous>  polyethylene glycol 3350 17 Gram(s) Oral daily  potassium chloride  20 mEq/100 mL IVPB 20 milliEquivalent(s) IV Intermittent every 2 hours  propofol Infusion 10 MICROgram(s)/kG/Min (5.03 mL/Hr) IV Continuous <Continuous>  senna 2 Tablet(s) Oral at bedtime      MEDICATIONS  (PRN):  acetaminophen    Suspension .. 650 milliGRAM(s) Oral every 6 hours PRN Temp greater or equal to 38C (100.4F)  ALBUTerol    90 MICROgram(s) HFA Inhaler 2 Puff(s) Inhalation every 6 hours PRN Shortness of Breath and/or Wheezing  artificial  tears Solution 1 Drop(s) Both EYES every 4 hours PRN Dry Eyes  sodium chloride 0.9% lock flush 10 milliLiter(s) IV Push every 1 hour PRN Pre/post blood products, medications, blood draw, and to maintain line patency      Physical Exam    Neuro :  no focal deficits  Respiratory: CTA B/L  CV: RRR, S1S2, no murmurs,   Abdominal: Soft, NT, ND +BS,  Extremities: No edema, + peripheral pulses      ASSESSMENT    Hypoxemia 2nd to covid pna   transaminitis  prediabetes  h/o appendectomy  cholecystectomy        PLAN    contact and airborne isolation  d/c remdesevir given covid ab positive noted   completed dexamethasone   started pulse steroids for 3 days - 250mg solumedrol bid now tapered off 4/14/21  cont asa, vit c,    cont albuterol inhaler   pulm f/u  procalcitonin, D-dimer, crp, ldh, ferritin, lactate noted ,    tmx 99.2  cont tylenol prn,   cont robitussin prn   pt on fentanyl, phenylephrine, midazolam, propofol drip  s/p intubation 3/29/21   s/p tracheostomy 4/21/21  O2 sat (91% - 98%) mech vent 60%  O2 via mech vent and taper fio2 as tolerated   vent mgmt as per icu  xray 3/19/21 with pneumomediastinum  rept cxr with New trace right apical pneumothorax. New mild left apical pneumothorax. Grossly stable small pneumomediastinum.  Soft tissue emphysema at the neck bases bilaterally. Grossly stable bilateral pulmonary infiltrates noted.   cxr 2/24 with No evidence of pneumothorax can be appreciated on the available image. This may be related to patient positioning. Evidence of pneumomediastinum and subcutaneous emphysema in the lower neck is again noted. There are patchy bibasilar infiltrates and elevated right hemidiaphragm noted.   cxr 3/29/21 with No significant change bilateral infiltrates. There is a small simple left apical pneumothorax. No significant pleural effusion. Bilateral subcutaneous emphysema similar to prior.   XRs on 7AM and 5PM with no obvious increase of ptx  cxr 4/4/21 with Improving bilateral airspace disease noted    cxr 4/8/21 with Small left pneumothorax noted.  thoracic surg f/u   s/p L chest tube replacement 4/18/21 for recurrence of left pneumothorax   cxr 4/20/21 with Unchanged advanced infiltrates and catheter left chest tube noted   CXR 4/11/21 with : No interval change compared to one day prior. No pneumothorax noted.   Daily CXR  Monitor O2 status   s/p tracheostomy 4/21/21   cxr 4/21/21 with No evidence of active chest disease. Tracheostomy tube in place otherwise no significant change noted above  surg eval for gastrostomy placement noted  thoracic team unable to place gastrostomy tube percutaneously.   plan for open gastrostomy tube 4/23/2021.  id f/u   No need for further antibiotics as patient completed course of Meropenem  leukocytosis resolved  speutum cx with Enterobacter aerogenes (Carbapenem Resistant) noted above  andrea   lispro ss   prognosis poor  cont current meds  mgmt as per icu

## 2021-04-22 NOTE — PROGRESS NOTE ADULT - PROBLEM SELECTOR PLAN 2
2/2 covid-19 PNA; comorbid L pneumothorax. 4/22 CXR: Unchanged chest with advanced infiltrates. Patient remains sedated; POD #1 trach, on pressor + midodrine. + L chest tube. High risk of mortality. Full code.

## 2021-04-22 NOTE — PROGRESS NOTE ADULT - ASSESSMENT
A/P: 55M s/p covid POD1 trach, no acute distress  - continue monitor resp status  - surgicell out 4/23  - stay sutures out 2 weeks from OR date  - surgery continues to follow

## 2021-04-23 LAB
ALBUMIN SERPL ELPH-MCNC: 2.1 G/DL — LOW (ref 3.5–5)
ALP SERPL-CCNC: 93 U/L — SIGNIFICANT CHANGE UP (ref 40–120)
ALT FLD-CCNC: 20 U/L DA — SIGNIFICANT CHANGE UP (ref 10–60)
ANION GAP SERPL CALC-SCNC: 4 MMOL/L — LOW (ref 5–17)
AST SERPL-CCNC: 32 U/L — SIGNIFICANT CHANGE UP (ref 10–40)
BASE EXCESS BLDA CALC-SCNC: 16 MMOL/L — HIGH (ref -2–3)
BASOPHILS # BLD AUTO: 0.03 K/UL — SIGNIFICANT CHANGE UP (ref 0–0.2)
BASOPHILS NFR BLD AUTO: 0.3 % — SIGNIFICANT CHANGE UP (ref 0–2)
BILIRUB SERPL-MCNC: 0.5 MG/DL — SIGNIFICANT CHANGE UP (ref 0.2–1.2)
BLD GP AB SCN SERPL QL: SIGNIFICANT CHANGE UP
BLOOD GAS COMMENTS ARTERIAL: SIGNIFICANT CHANGE UP
BUN SERPL-MCNC: 12 MG/DL — SIGNIFICANT CHANGE UP (ref 7–18)
CALCIUM SERPL-MCNC: 7.9 MG/DL — LOW (ref 8.4–10.5)
CHLORIDE SERPL-SCNC: 106 MMOL/L — SIGNIFICANT CHANGE UP (ref 96–108)
CO2 SERPL-SCNC: 37 MMOL/L — HIGH (ref 22–31)
CREAT SERPL-MCNC: <0.2 MG/DL — LOW (ref 0.5–1.3)
D DIMER BLD IA.RAPID-MCNC: 1097 NG/ML DDU — HIGH
EOSINOPHIL # BLD AUTO: 0.17 K/UL — SIGNIFICANT CHANGE UP (ref 0–0.5)
EOSINOPHIL NFR BLD AUTO: 1.8 % — SIGNIFICANT CHANGE UP (ref 0–6)
GLUCOSE BLDC GLUCOMTR-MCNC: 112 MG/DL — HIGH (ref 70–99)
GLUCOSE BLDC GLUCOMTR-MCNC: 113 MG/DL — HIGH (ref 70–99)
GLUCOSE BLDC GLUCOMTR-MCNC: 120 MG/DL — HIGH (ref 70–99)
GLUCOSE BLDC GLUCOMTR-MCNC: 120 MG/DL — HIGH (ref 70–99)
GLUCOSE BLDC GLUCOMTR-MCNC: 127 MG/DL — HIGH (ref 70–99)
GLUCOSE BLDC GLUCOMTR-MCNC: 127 MG/DL — HIGH (ref 70–99)
GLUCOSE BLDC GLUCOMTR-MCNC: 151 MG/DL — HIGH (ref 70–99)
GLUCOSE SERPL-MCNC: 112 MG/DL — HIGH (ref 70–99)
HCO3 BLDA-SCNC: 45 MMOL/L — CRITICAL HIGH (ref 21–28)
HCT VFR BLD CALC: 25.6 % — LOW (ref 39–50)
HGB BLD-MCNC: 8.1 G/DL — LOW (ref 13–17)
HOROWITZ INDEX BLDA+IHG-RTO: 60 — SIGNIFICANT CHANGE UP
IMM GRANULOCYTES NFR BLD AUTO: 4.4 % — HIGH (ref 0–1.5)
LYMPHOCYTES # BLD AUTO: 1.68 K/UL — SIGNIFICANT CHANGE UP (ref 1–3.3)
LYMPHOCYTES # BLD AUTO: 17.9 % — SIGNIFICANT CHANGE UP (ref 13–44)
MAGNESIUM SERPL-MCNC: 2.2 MG/DL — SIGNIFICANT CHANGE UP (ref 1.6–2.6)
MCHC RBC-ENTMCNC: 31.6 GM/DL — LOW (ref 32–36)
MCHC RBC-ENTMCNC: 33.9 PG — SIGNIFICANT CHANGE UP (ref 27–34)
MCV RBC AUTO: 107.1 FL — HIGH (ref 80–100)
MONOCYTES # BLD AUTO: 0.58 K/UL — SIGNIFICANT CHANGE UP (ref 0–0.9)
MONOCYTES NFR BLD AUTO: 6.2 % — SIGNIFICANT CHANGE UP (ref 2–14)
NEUTROPHILS # BLD AUTO: 6.54 K/UL — SIGNIFICANT CHANGE UP (ref 1.8–7.4)
NEUTROPHILS NFR BLD AUTO: 69.4 % — SIGNIFICANT CHANGE UP (ref 43–77)
NRBC # BLD: 0 /100 WBCS — SIGNIFICANT CHANGE UP (ref 0–0)
PCO2 BLDA: 72 MMHG — CRITICAL HIGH (ref 35–48)
PH BLDA: 7.4 — SIGNIFICANT CHANGE UP (ref 7.35–7.45)
PHOSPHATE SERPL-MCNC: 3.4 MG/DL — SIGNIFICANT CHANGE UP (ref 2.5–4.5)
PLATELET # BLD AUTO: 339 K/UL — SIGNIFICANT CHANGE UP (ref 150–400)
PO2 BLDA: 102 MMHG — SIGNIFICANT CHANGE UP (ref 83–108)
POTASSIUM SERPL-MCNC: 3.8 MMOL/L — SIGNIFICANT CHANGE UP (ref 3.5–5.3)
POTASSIUM SERPL-SCNC: 3.8 MMOL/L — SIGNIFICANT CHANGE UP (ref 3.5–5.3)
PROT SERPL-MCNC: 6.2 G/DL — SIGNIFICANT CHANGE UP (ref 6–8.3)
RBC # BLD: 2.39 M/UL — LOW (ref 4.2–5.8)
RBC # FLD: 15.2 % — HIGH (ref 10.3–14.5)
SAO2 % BLDA: 99 % — SIGNIFICANT CHANGE UP
SODIUM SERPL-SCNC: 147 MMOL/L — HIGH (ref 135–145)
WBC # BLD: 9.41 K/UL — SIGNIFICANT CHANGE UP (ref 3.8–10.5)
WBC # FLD AUTO: 9.41 K/UL — SIGNIFICANT CHANGE UP (ref 3.8–10.5)

## 2021-04-23 PROCEDURE — 71045 X-RAY EXAM CHEST 1 VIEW: CPT | Mod: 26

## 2021-04-23 RX ORDER — PROPOFOL 10 MG/ML
10 INJECTION, EMULSION INTRAVENOUS
Qty: 1000 | Refills: 0 | Status: DISCONTINUED | OUTPATIENT
Start: 2021-04-23 | End: 2021-04-24

## 2021-04-23 RX ORDER — FENTANYL CITRATE 50 UG/ML
1 INJECTION INTRAVENOUS
Qty: 2500 | Refills: 0 | Status: DISCONTINUED | OUTPATIENT
Start: 2021-04-23 | End: 2021-04-27

## 2021-04-23 RX ORDER — PANTOPRAZOLE SODIUM 20 MG/1
40 TABLET, DELAYED RELEASE ORAL DAILY
Refills: 0 | Status: DISCONTINUED | OUTPATIENT
Start: 2021-04-23 | End: 2021-04-26

## 2021-04-23 RX ORDER — ALBUTEROL 90 UG/1
2 AEROSOL, METERED ORAL EVERY 6 HOURS
Refills: 0 | Status: DISCONTINUED | OUTPATIENT
Start: 2021-04-23 | End: 2021-06-11

## 2021-04-23 RX ORDER — MIDODRINE HYDROCHLORIDE 2.5 MG/1
10 TABLET ORAL THREE TIMES A DAY
Refills: 0 | Status: DISCONTINUED | OUTPATIENT
Start: 2021-04-23 | End: 2021-04-27

## 2021-04-23 RX ORDER — ASPIRIN/CALCIUM CARB/MAGNESIUM 324 MG
81 TABLET ORAL DAILY
Refills: 0 | Status: DISCONTINUED | OUTPATIENT
Start: 2021-04-23 | End: 2021-04-27

## 2021-04-23 RX ORDER — CHLORHEXIDINE GLUCONATE 213 G/1000ML
15 SOLUTION TOPICAL
Refills: 0 | Status: DISCONTINUED | OUTPATIENT
Start: 2021-04-23 | End: 2021-04-24

## 2021-04-23 RX ORDER — SENNA PLUS 8.6 MG/1
10 TABLET ORAL AT BEDTIME
Refills: 0 | Status: DISCONTINUED | OUTPATIENT
Start: 2021-04-23 | End: 2021-04-23

## 2021-04-23 RX ORDER — POTASSIUM CHLORIDE 20 MEQ
10 PACKET (EA) ORAL
Refills: 0 | Status: COMPLETED | OUTPATIENT
Start: 2021-04-23 | End: 2021-04-23

## 2021-04-23 RX ORDER — ACETAZOLAMIDE 250 MG/1
250 TABLET ORAL EVERY 12 HOURS
Refills: 0 | Status: COMPLETED | OUTPATIENT
Start: 2021-04-23 | End: 2021-04-25

## 2021-04-23 RX ORDER — ASCORBIC ACID 60 MG
1000 TABLET,CHEWABLE ORAL DAILY
Refills: 0 | Status: DISCONTINUED | OUTPATIENT
Start: 2021-04-23 | End: 2021-05-08

## 2021-04-23 RX ORDER — CHLORHEXIDINE GLUCONATE 213 G/1000ML
1 SOLUTION TOPICAL
Refills: 0 | Status: DISCONTINUED | OUTPATIENT
Start: 2021-04-23 | End: 2021-06-11

## 2021-04-23 RX ORDER — POTASSIUM CHLORIDE 20 MEQ
40 PACKET (EA) ORAL ONCE
Refills: 0 | Status: COMPLETED | OUTPATIENT
Start: 2021-04-23 | End: 2021-04-23

## 2021-04-23 RX ORDER — POLYETHYLENE GLYCOL 3350 17 G/17G
17 POWDER, FOR SOLUTION ORAL DAILY
Refills: 0 | Status: DISCONTINUED | OUTPATIENT
Start: 2021-04-23 | End: 2021-04-27

## 2021-04-23 RX ORDER — SODIUM CHLORIDE 9 MG/ML
10 INJECTION INTRAMUSCULAR; INTRAVENOUS; SUBCUTANEOUS
Refills: 0 | Status: DISCONTINUED | OUTPATIENT
Start: 2021-04-23 | End: 2021-05-11

## 2021-04-23 RX ORDER — DEXTROSE 50 % IN WATER 50 %
50 SYRINGE (ML) INTRAVENOUS ONCE
Refills: 0 | Status: COMPLETED | OUTPATIENT
Start: 2021-04-23 | End: 2021-04-23

## 2021-04-23 RX ORDER — ENOXAPARIN SODIUM 100 MG/ML
40 INJECTION SUBCUTANEOUS DAILY
Refills: 0 | Status: DISCONTINUED | OUTPATIENT
Start: 2021-04-23 | End: 2021-04-27

## 2021-04-23 RX ORDER — MIDAZOLAM HYDROCHLORIDE 1 MG/ML
0.06 INJECTION, SOLUTION INTRAMUSCULAR; INTRAVENOUS
Qty: 100 | Refills: 0 | Status: DISCONTINUED | OUTPATIENT
Start: 2021-04-23 | End: 2021-04-26

## 2021-04-23 RX ORDER — INSULIN LISPRO 100/ML
VIAL (ML) SUBCUTANEOUS EVERY 6 HOURS
Refills: 0 | Status: DISCONTINUED | OUTPATIENT
Start: 2021-04-23 | End: 2021-05-26

## 2021-04-23 RX ADMIN — Medication 40 MILLIEQUIVALENT(S): at 01:37

## 2021-04-23 RX ADMIN — ACETAZOLAMIDE 105 MILLIGRAM(S): 250 TABLET ORAL at 05:55

## 2021-04-23 RX ADMIN — Medication 100 MILLIEQUIVALENT(S): at 10:47

## 2021-04-23 RX ADMIN — FENTANYL CITRATE 41.9 MICROGRAM(S)/KG/HR: 50 INJECTION INTRAVENOUS at 06:45

## 2021-04-23 RX ADMIN — FENTANYL CITRATE 41.9 MICROGRAM(S)/KG/HR: 50 INJECTION INTRAVENOUS at 15:51

## 2021-04-23 RX ADMIN — ACETAZOLAMIDE 105 MILLIGRAM(S): 250 TABLET ORAL at 18:39

## 2021-04-23 RX ADMIN — Medication 100 MILLIEQUIVALENT(S): at 11:51

## 2021-04-23 RX ADMIN — Medication 50 MILLILITER(S): at 01:37

## 2021-04-23 RX ADMIN — MIDAZOLAM HYDROCHLORIDE 10.9 MG/KG/HR: 1 INJECTION, SOLUTION INTRAMUSCULAR; INTRAVENOUS at 15:51

## 2021-04-23 RX ADMIN — PROPOFOL 5.03 MICROGRAM(S)/KG/MIN: 10 INJECTION, EMULSION INTRAVENOUS at 05:57

## 2021-04-23 RX ADMIN — CHLORHEXIDINE GLUCONATE 15 MILLILITER(S): 213 SOLUTION TOPICAL at 05:55

## 2021-04-23 RX ADMIN — PROPOFOL 5.03 MICROGRAM(S)/KG/MIN: 10 INJECTION, EMULSION INTRAVENOUS at 01:38

## 2021-04-23 RX ADMIN — PROPOFOL 5.03 MICROGRAM(S)/KG/MIN: 10 INJECTION, EMULSION INTRAVENOUS at 20:00

## 2021-04-23 RX ADMIN — PANTOPRAZOLE SODIUM 40 MILLIGRAM(S): 20 TABLET, DELAYED RELEASE ORAL at 11:10

## 2021-04-23 RX ADMIN — PROPOFOL 5.03 MICROGRAM(S)/KG/MIN: 10 INJECTION, EMULSION INTRAVENOUS at 15:51

## 2021-04-23 RX ADMIN — MIDODRINE HYDROCHLORIDE 10 MILLIGRAM(S): 2.5 TABLET ORAL at 05:55

## 2021-04-23 RX ADMIN — CHLORHEXIDINE GLUCONATE 1 APPLICATION(S): 213 SOLUTION TOPICAL at 05:57

## 2021-04-23 RX ADMIN — Medication 100 MILLIEQUIVALENT(S): at 13:08

## 2021-04-23 NOTE — BRIEF OPERATIVE NOTE - NSICDXBRIEFPROCEDURE_GEN_ALL_CORE_FT
PROCEDURES:  Insertion, gastrostomy tube, open 23-Apr-2021 17:08:39  Regina Lema  
PROCEDURES:  Open tracheostomy 21-Apr-2021 16:03:45  Antwan Jimenez

## 2021-04-23 NOTE — PROGRESS NOTE ADULT - ASSESSMENT
A/P: 55M s/p covid POD2 trach, no acute distress  - continue monitor resp status  - surgicell removed  - stay sutures will be taken out on May 5th  - no thoracic surgical intervention indicated at this time, reconsult as needed

## 2021-04-23 NOTE — PROGRESS NOTE ADULT - SUBJECTIVE AND OBJECTIVE BOX
Condition discussed with brother, Brennan, options risks and benefits explained.  Questions answered.  Insertion gastrostomy tube today.

## 2021-04-23 NOTE — BRIEF OPERATIVE NOTE - OPERATION/FINDINGS
open tracheostomy performed with good end tidal CO2 and good hemostasis, size 6F cuffed placed.  PEG aborted due to no available safe window seen after insuflation.
gastrostomy tube placed

## 2021-04-23 NOTE — PROGRESS NOTE ADULT - SUBJECTIVE AND OBJECTIVE BOX
INTERVAL HPI/OVERNIGHT EVENTS: ***    PRESSORS: [ ] YES [ ] NO  WHICH:    ANTIBIOTICS:                  DATE STARTED:  ANTIBIOTICS:                  DATE STARTED:  ANTIBIOTICS:                  DATE STARTED:    Antimicrobial:    Cardiovascular:  acetaZOLAMIDE  IVPB 250 milliGRAM(s) IV Intermittent every 12 hours  midodrine. 10 milliGRAM(s) Oral three times a day  phenylephrine    Infusion 1.5 MICROgram(s)/kG/Min IV Continuous <Continuous>    Pulmonary:  ALBUTerol    90 MICROgram(s) HFA Inhaler 2 Puff(s) Inhalation every 6 hours PRN    Hematalogic:  aspirin  chewable 81 milliGRAM(s) Oral daily    Other:  acetaminophen    Suspension .. 650 milliGRAM(s) Oral every 6 hours PRN  artificial  tears Solution 1 Drop(s) Both EYES every 4 hours PRN  ascorbic acid 1000 milliGRAM(s) Oral daily  chlorhexidine 0.12% Liquid 15 milliLiter(s) Oral Mucosa two times a day  chlorhexidine 2% Cloths 1 Application(s) Topical <User Schedule>  fentaNYL   Infusion 5 MICROgram(s)/kG/Hr IV Continuous <Continuous>  insulin lispro (ADMELOG) corrective regimen sliding scale   SubCutaneous every 6 hours  midazolam Infusion 0.13 mG/kG/Hr IV Continuous <Continuous>  pantoprazole  Injectable 40 milliGRAM(s) IV Push daily  polyethylene glycol 3350 17 Gram(s) Oral daily  propofol Infusion 10 MICROgram(s)/kG/Min IV Continuous <Continuous>  senna 2 Tablet(s) Oral at bedtime  sodium chloride 0.9% lock flush 10 milliLiter(s) IV Push every 1 hour PRN    acetaminophen    Suspension .. 650 milliGRAM(s) Oral every 6 hours PRN  acetaZOLAMIDE  IVPB 250 milliGRAM(s) IV Intermittent every 12 hours  ALBUTerol    90 MICROgram(s) HFA Inhaler 2 Puff(s) Inhalation every 6 hours PRN  artificial  tears Solution 1 Drop(s) Both EYES every 4 hours PRN  ascorbic acid 1000 milliGRAM(s) Oral daily  aspirin  chewable 81 milliGRAM(s) Oral daily  chlorhexidine 0.12% Liquid 15 milliLiter(s) Oral Mucosa two times a day  chlorhexidine 2% Cloths 1 Application(s) Topical <User Schedule>  fentaNYL   Infusion 5 MICROgram(s)/kG/Hr IV Continuous <Continuous>  insulin lispro (ADMELOG) corrective regimen sliding scale   SubCutaneous every 6 hours  midazolam Infusion 0.13 mG/kG/Hr IV Continuous <Continuous>  midodrine. 10 milliGRAM(s) Oral three times a day  pantoprazole  Injectable 40 milliGRAM(s) IV Push daily  phenylephrine    Infusion 1.5 MICROgram(s)/kG/Min IV Continuous <Continuous>  polyethylene glycol 3350 17 Gram(s) Oral daily  propofol Infusion 10 MICROgram(s)/kG/Min IV Continuous <Continuous>  senna 2 Tablet(s) Oral at bedtime  sodium chloride 0.9% lock flush 10 milliLiter(s) IV Push every 1 hour PRN    Drug Dosing Weight  Height (cm): 167.6 (14 Mar 2021 12:03)  Weight (kg): 83.869 (14 Mar 2021 12:03)  BMI (kg/m2): 29.9 (14 Mar 2021 12:03)  BSA (m2): 1.93 (14 Mar 2021 12:03)    CENTRAL LINE: [ ] YES [ ] NO  LOCATION:   DATE INSERTED:  REMOVE: [ ] YES [ ] NO  EXPLAIN:    RIVERA: [ ] YES [ ] NO    DATE INSERTED:  REMOVE:  [ ] YES [ ] NO  EXPLAIN:    A-LINE:  [ ] YES [ ] NO  LOCATION:   DATE INSERTED:  REMOVE:  [ ] YES [ ] NO  EXPLAIN:    PMH -reviewed admission note, no change since admission  PAST MEDICAL & SURGICAL HISTORY:  No pertinent past medical history    S/P moody  1990s    S/P appendectomy  1990s        ICU Vital Signs Last 24 Hrs  T(C): 36.9 (23 Apr 2021 07:00), Max: 38.2 (22 Apr 2021 15:00)  T(F): 98.5 (23 Apr 2021 07:00), Max: 100.8 (22 Apr 2021 15:00)  HR: 106 (23 Apr 2021 07:00) (90 - 131)  BP: --  BP(mean): --  ABP: 134/76 (23 Apr 2021 07:00) (60/57 - 142/79)  ABP(mean): 102 (23 Apr 2021 07:00) (0 - 168)  RR: 27 (23 Apr 2021 07:00) (12 - 37)  SpO2: 100% (23 Apr 2021 07:00) (83% - 100%)      ABG - ( 23 Apr 2021 04:57 )  pH, Arterial: 7.40  pH, Blood: x     /  pCO2: 72    /  pO2: 102   / HCO3: 45    / Base Excess: 16.0  /  SaO2: 99                    04-22 @ 07:01  -  04-23 @ 07:00  --------------------------------------------------------  IN: 2632 mL / OUT: 2150 mL / NET: 482 mL        Mode: AC/ CMV (Assist Control/ Continuous Mandatory Ventilation)  RR (machine): 28  TV (machine): 430  FiO2: 60  PEEP: 3  ITime: 1  MAP: 12  PIP: 42      PHYSICAL EXAM:    GENERAL: [ ]NAD, [ ]well-groomed, [ ]well-developed  HEAD:  [ ]Atraumatic, [ ]Normocephalic  EYES: [ ]EOMI, [ ]PERRLA, [ ]conjunctiva and sclera clear  ENMT: [ ]No tonsillar erythema, exudates, or enlargement; [ ]Moist mucous membranes, [ ]Good dentition, [ ]No lesions  NECK: [ ]Supple, normal appearance, [ ]No JVD; [ ]Normal thyroid; [ ]Trachea midline  NERVOUS SYSTEM:  [ ]Alert & Oriented X3, [ ]Good concentration; [ ]Motor Strength 5/5 B/L upper and lower extremities; [ ]DTRs 2+ intact and symmetric  CHEST/LUNG: [ ]No chest deformity; [ ]Normal percussion bilaterally; [ ]No rales, rhonchi, wheezing   HEART: [ ]Regular rate and rhythm; [ ]No murmurs, rubs, or gallops  ABDOMEN: [ ]Soft, Nontender, Nondistended; [ ]Bowel sounds present  EXTREMITIES:  [ ]2+ Peripheral Pulses, [ ]No clubbing, cyanosis, or edema  LYMPH: [ ]No lymphadenopathy noted  SKIN: [ ]No rashes or lesions; [ ]Good capillary refill      LABS:  CBC Full  -  ( 23 Apr 2021 06:21 )  WBC Count : 9.41 K/uL  RBC Count : 2.39 M/uL  Hemoglobin : 8.1 g/dL  Hematocrit : 25.6 %  Platelet Count - Automated : 339 K/uL  Mean Cell Volume : 107.1 fl  Mean Cell Hemoglobin : 33.9 pg  Mean Cell Hemoglobin Concentration : 31.6 gm/dL  Auto Neutrophil # : 6.54 K/uL  Auto Lymphocyte # : 1.68 K/uL  Auto Monocyte # : 0.58 K/uL  Auto Eosinophil # : 0.17 K/uL  Auto Basophil # : 0.03 K/uL  Auto Neutrophil % : 69.4 %  Auto Lymphocyte % : 17.9 %  Auto Monocyte % : 6.2 %  Auto Eosinophil % : 1.8 %  Auto Basophil % : 0.3 %    04-23    147<H>  |  106  |  12  ----------------------------<  112<H>  3.8   |  37<H>  |  <0.20<L>    Ca    7.9<L>      23 Apr 2021 06:21  Phos  3.4     04-23  Mg     2.2     04-23    TPro  6.2  /  Alb  2.1<L>  /  TBili  0.5  /  DBili  x   /  AST  32  /  ALT  20  /  AlkPhos  93  04-23    PT/INR - ( 22 Apr 2021 03:55 )   PT: 13.3 sec;   INR: 1.12 ratio         PTT - ( 22 Apr 2021 03:55 )  PTT:36.4 sec        RADIOLOGY & ADDITIONAL STUDIES REVIEWED:  ***    [ ]GOALS OF CARE DISCUSSION WITH PATIENT/FAMILY/PROXY:    CRITICAL CARE TIME SPENT: 35 minutes INTERVAL HPI/OVERNIGHT EVENTS: No significant event overnight,     PRESSORS: [ ] YES [ ] NO  WHICH:    ANTIBIOTICS:                  DATE STARTED:  ANTIBIOTICS:                  DATE STARTED:  ANTIBIOTICS:                  DATE STARTED:    Antimicrobial:    Cardiovascular:  acetaZOLAMIDE  IVPB 250 milliGRAM(s) IV Intermittent every 12 hours  midodrine. 10 milliGRAM(s) Oral three times a day  phenylephrine    Infusion 1.5 MICROgram(s)/kG/Min IV Continuous <Continuous>    Pulmonary:  ALBUTerol    90 MICROgram(s) HFA Inhaler 2 Puff(s) Inhalation every 6 hours PRN    Hematalogic:  aspirin  chewable 81 milliGRAM(s) Oral daily    Other:  acetaminophen    Suspension .. 650 milliGRAM(s) Oral every 6 hours PRN  artificial  tears Solution 1 Drop(s) Both EYES every 4 hours PRN  ascorbic acid 1000 milliGRAM(s) Oral daily  chlorhexidine 0.12% Liquid 15 milliLiter(s) Oral Mucosa two times a day  chlorhexidine 2% Cloths 1 Application(s) Topical <User Schedule>  fentaNYL   Infusion 5 MICROgram(s)/kG/Hr IV Continuous <Continuous>  insulin lispro (ADMELOG) corrective regimen sliding scale   SubCutaneous every 6 hours  midazolam Infusion 0.13 mG/kG/Hr IV Continuous <Continuous>  pantoprazole  Injectable 40 milliGRAM(s) IV Push daily  polyethylene glycol 3350 17 Gram(s) Oral daily  propofol Infusion 10 MICROgram(s)/kG/Min IV Continuous <Continuous>  senna 2 Tablet(s) Oral at bedtime  sodium chloride 0.9% lock flush 10 milliLiter(s) IV Push every 1 hour PRN    acetaminophen    Suspension .. 650 milliGRAM(s) Oral every 6 hours PRN  acetaZOLAMIDE  IVPB 250 milliGRAM(s) IV Intermittent every 12 hours  ALBUTerol    90 MICROgram(s) HFA Inhaler 2 Puff(s) Inhalation every 6 hours PRN  artificial  tears Solution 1 Drop(s) Both EYES every 4 hours PRN  ascorbic acid 1000 milliGRAM(s) Oral daily  aspirin  chewable 81 milliGRAM(s) Oral daily  chlorhexidine 0.12% Liquid 15 milliLiter(s) Oral Mucosa two times a day  chlorhexidine 2% Cloths 1 Application(s) Topical <User Schedule>  fentaNYL   Infusion 5 MICROgram(s)/kG/Hr IV Continuous <Continuous>  insulin lispro (ADMELOG) corrective regimen sliding scale   SubCutaneous every 6 hours  midazolam Infusion 0.13 mG/kG/Hr IV Continuous <Continuous>  midodrine. 10 milliGRAM(s) Oral three times a day  pantoprazole  Injectable 40 milliGRAM(s) IV Push daily  phenylephrine    Infusion 1.5 MICROgram(s)/kG/Min IV Continuous <Continuous>  polyethylene glycol 3350 17 Gram(s) Oral daily  propofol Infusion 10 MICROgram(s)/kG/Min IV Continuous <Continuous>  senna 2 Tablet(s) Oral at bedtime  sodium chloride 0.9% lock flush 10 milliLiter(s) IV Push every 1 hour PRN    Drug Dosing Weight  Height (cm): 167.6 (14 Mar 2021 12:03)  Weight (kg): 83.869 (14 Mar 2021 12:03)  BMI (kg/m2): 29.9 (14 Mar 2021 12:03)  BSA (m2): 1.93 (14 Mar 2021 12:03)    CENTRAL LINE: [ ] YES [ x] NO  LOCATION:   DATE INSERTED:  REMOVE: [ ] YES [ ] NO  EXPLAIN:    RIVERA: [ ] YES [x ] NO    DATE INSERTED:  REMOVE:  [ ] YES [ ] NO  EXPLAIN:    A-LINE:  [ x] YES [ ] NO  LOCATION: [place]  DATE INSERTED:  REMOVE:  [ ] YES [ ] NO  EXPLAIN:    PMH -reviewed admission note, no change since admission  PAST MEDICAL & SURGICAL HISTORY:  No pertinent past medical history    S/P moody  1990s    S/P appendectomy  1990s        ICU Vital Signs Last 24 Hrs  T(C): 36.9 (23 Apr 2021 07:00), Max: 38.2 (22 Apr 2021 15:00)  T(F): 98.5 (23 Apr 2021 07:00), Max: 100.8 (22 Apr 2021 15:00)  HR: 106 (23 Apr 2021 07:00) (90 - 131)  BP: --  BP(mean): --  ABP: 134/76 (23 Apr 2021 07:00) (60/57 - 142/79)  ABP(mean): 102 (23 Apr 2021 07:00) (0 - 168)  RR: 27 (23 Apr 2021 07:00) (12 - 37)  SpO2: 100% (23 Apr 2021 07:00) (83% - 100%)      ABG - ( 23 Apr 2021 04:57 )  pH, Arterial: 7.40  pH, Blood: x     /  pCO2: 72    /  pO2: 102   / HCO3: 45    / Base Excess: 16.0  /  SaO2: 99                    04-22 @ 07:01  -  04-23 @ 07:00  --------------------------------------------------------  IN: 2632 mL / OUT: 2150 mL / NET: 482 mL        Mode: AC/ CMV (Assist Control/ Continuous Mandatory Ventilation)  RR (machine): 28  TV (machine): 430  FiO2: 60  PEEP: 3  ITime: 1  MAP: 12  PIP: 42      PHYSICAL EXAM:    GENERAL: NAD, on Vent, sedated  HEAD: Normocephalic, atraumatic  EYES:  Dry eyes, conjunctivae/sclera clear, equal pupils  NECK: Tracheostomy tube in place  NERVOUS SYSTEM:  sedated  CHEST/LUNG: B/L crackles (R>L), equal lung expansion  HEART: Regular rate and rhythm; No murmurs, rubs, or gallops, tachycardia  ABDOMEN: Soft, Nontender, Nondistended; Bowel sounds present  EXTREMITIES:  b/l upper extremities edema +2,  no edema on lower legs. No clubbing, cyanosis   SKIN: No rashes  + capillary refill <2 sec      LABS:  CBC Full  -  ( 23 Apr 2021 06:21 )  WBC Count : 9.41 K/uL  RBC Count : 2.39 M/uL  Hemoglobin : 8.1 g/dL  Hematocrit : 25.6 %  Platelet Count - Automated : 339 K/uL  Mean Cell Volume : 107.1 fl  Mean Cell Hemoglobin : 33.9 pg  Mean Cell Hemoglobin Concentration : 31.6 gm/dL  Auto Neutrophil # : 6.54 K/uL  Auto Lymphocyte # : 1.68 K/uL  Auto Monocyte # : 0.58 K/uL  Auto Eosinophil # : 0.17 K/uL  Auto Basophil # : 0.03 K/uL  Auto Neutrophil % : 69.4 %  Auto Lymphocyte % : 17.9 %  Auto Monocyte % : 6.2 %  Auto Eosinophil % : 1.8 %  Auto Basophil % : 0.3 %    04-23    147<H>  |  106  |  12  ----------------------------<  112<H>  3.8   |  37<H>  |  <0.20<L>    Ca    7.9<L>      23 Apr 2021 06:21  Phos  3.4     04-23  Mg     2.2     04-23    TPro  6.2  /  Alb  2.1<L>  /  TBili  0.5  /  DBili  x   /  AST  32  /  ALT  20  /  AlkPhos  93  04-23    PT/INR - ( 22 Apr 2021 03:55 )   PT: 13.3 sec;   INR: 1.12 ratio         PTT - ( 22 Apr 2021 03:55 )  PTT:36.4 sec        RADIOLOGY & ADDITIONAL STUDIES REVIEWED:  ***    [ ]GOALS OF CARE DISCUSSION WITH PATIENT/FAMILY/PROXY:    CRITICAL CARE TIME SPENT: 35 minutes

## 2021-04-23 NOTE — PROGRESS NOTE ADULT - SUBJECTIVE AND OBJECTIVE BOX
55M POD2 tracheostomy for acute respirator failure s/p covid PNA.    Seen at bedside. No acute events overnight. Holding saturation, peak pressures in 40s. CXray shows trach in appropriate position. Continued tachycardic to 110s. Trach site without significant swelling or bleeding. Surgicell removed. Patient is preop for open gastrostomy tube.    ICU Vital Signs Last 24 Hrs  T(C): 36.9 (23 Apr 2021 07:00), Max: 38.2 (22 Apr 2021 15:00)  T(F): 98.5 (23 Apr 2021 07:00), Max: 100.8 (22 Apr 2021 15:00)  HR: 106 (23 Apr 2021 07:00) (90 - 131)  BP: --  BP(mean): --  ABP: 134/76 (23 Apr 2021 07:00) (60/57 - 142/79)  ABP(mean): 102 (23 Apr 2021 07:00) (0 - 168)  RR: 27 (23 Apr 2021 07:00) (12 - 37)  SpO2: 100% (23 Apr 2021 07:00) (83% - 100%)  Gen: sedated, trached  HEENT: trach in position, no significant bleeding or swelling, surgicell in place mild saturation  Resp: on vent,  5 30 65  Abd: nt nd  Neuro: GCS15 AOx3                          8.1    9.41  )-----------( 339      ( 23 Apr 2021 06:21 )             25.6     04-23    147<H>  |  106  |  12  ----------------------------<  112<H>  3.8   |  37<H>  |  <0.20<L>    Ca    7.9<L>      23 Apr 2021 06:21  Phos  3.4     04-23  Mg     2.2     04-23    TPro  6.2  /  Alb  2.1<L>  /  TBili  0.5  /  DBili  x   /  AST  32  /  ALT  20  /  AlkPhos  93  04-23

## 2021-04-23 NOTE — PROGRESS NOTE ADULT - ASSESSMENT
1. Acute hypoxic respiratory failure  2. ARDS 2/2 Covid pneumonia  3. Transaminitis  4. Prediabetes  5. Abnormal TSH    =================== Neuro============================  Alert and oriented x 3 at baseline   intubated and sedated 3/29 on Vent,   Trach 4/22,   Continue Versed to help with vent synchrony with Fentanyl  Added propofol for sedation   Plan is to titrate down Versed and start on Valium for benzo dependance,  Cut down on sedation as tolerated   CT head unremarkable     ================= Cardiovascular==========================  # Hypotension      Increase midodrine to 10 mg Q8  taper off phenylephrine as tolerated       # TACHYCARDIA: recurrent episodes   -Lopressor PNR pushes   -HR in 110's  -continue monitoring     ================- Pulm=================================  #Acute hypoxic respiratory failure: secondary to Covid pneumonia  #ARDS  -was on HFNr then got intubated 3/29  -VC Vent setting : 30/400/60/3  -D-dimer initially elevated on presentation, trending down    - s/p Nimbex and Epifanio at different occasions for vent synchrony  - s/p Pigtail on left chest , removed on 4/15  - Large Pneumothorax was noted on 4/18 on left side, s/p chest tube placement    -Remdesivir was discontinued due to positive antibodies   - Finished Dexamethasone   - prednisone taper, Finished    - Covid PCR negative now.,, D/C isolation       # Bacterial Pneumonia  - stable infiltrate on CXR ,likely developed VAP, Finished ABx Zosyn, meropenem, s/p 1 dose of Vanco  - MRSA negative from 3/26  - Sputum Cx growing few gram negative rods, CRE  - Pulm. dr. Ryan  - ID consult Dr Anand       #Pneumothorax and pneumomediastinum: resolved  -X-ray chest: tiny left apical pneumothorax  -Thoracic surgery following  -s/p Chest tube placed 4/8 pigtail to wall suction.  d/c'd 4/15  -X-ray monitoring daily, PTX resolved      ==================ID===================================  Likely bacterial pneumonia at present  -leukocytosis resolved  -No more fevers, On tylenol PRN only   - Finished Meropenem  - C-procal elevated  -plan as above     ================= Nephro================================  -pitting edema to both lower arms  -on condom cath   -monitor I/Os , good urine output overall  -s/p Albumin x 2 days on 4/10  -Lasix 40mg BID with Albumin 25% Q6 for 48 hours to help with urine output and fluid status.( 4/15) Albumin finished , was D/jennifer on 4/19, urine output decreasing  - Patient can get Lasix as needed   - Metabolic Alkalosis likely due to compensation     =================GI====================================  Transaminitis:   likely secondary to Covid19   - and  on presentation   hepatitis panel -ve  -Improved  -continue to monitor LFT    diet:   NGT (3/29) with tube feed  bowel regimen   Last BM 4/16      ================ Heme==================================  Elevated d-dimer: likely secondary to Covid  -d-dimer 423 on admission  -last D-dimer 2160, trending down   -continue prophylactic Lovenox 40 mg daily    Anemia  Hgb 8.8, no active signs of bleeding  will transfuse  if hgb drops.     =================Endocrine===============================  Prediabetes:    -A1c  5.8  -BS controlled  -continue HSS  -monitor FS while on steroids    Abnormal TSH:  -TSH level noted 0.26,   -repeat TSH 0.37 and Free T4 1.82    ================= Skin/Catheters============================  No rashes. Peripheral IV lines.   RIJ 4/13  RRA 3/29, Removed  RBA 4/20    - =================Prophylaxis =============================  Lovenox for DVT proph : Lovenox 40mg SQ daily  Protonix for  GI proph    ==================GOC==================================   FULL CODE     1. Acute hypoxic respiratory failure  2. ARDS 2/2 Covid pneumonia  3. Transaminitis  4. Prediabetes  5. Abnormal TSH    =================== Neuro============================  Alert and oriented x 3 at baseline   intubated and sedated 3/29 on Vent,   Trach 4/22,   Continue Versed to help with vent synchrony with Fentanyl  Added propofol for sedation   Plan is to titrate down Versed and start on Valium for benzo dependance,  Cut down on sedation as tolerated post procedure   CT head unremarkable     ================= Cardiovascular==========================  # Hypotension      Increase midodrine to 10 mg Q8  taper off phenylephrine as tolerated , Currently off pressers       # TACHYCARDIA: recurrent episodes   -Lopressor PNR pushes   -HR in 110's  -continue monitoring     ================- Pulm=================================  #Acute hypoxic respiratory failure: secondary to Covid pneumonia  #ARDS  -was on HFNr then got intubated 3/29  -VC Vent setting : 30/400/60/3  -D-dimer initially elevated on presentation, trending down    - s/p Nimbex and Epifanio at different occasions for vent synchrony  - s/p Pigtail on left chest , removed on 4/15  - Large Pneumothorax was noted on 4/18 on left side, s/p chest tube placement    -Remdesivir was discontinued due to positive antibodies   - Finished Dexamethasone   - prednisone taper, Finished    - Covid PCR negative now.,, D/C isolation       # Bacterial Pneumonia  - stable infiltrate on CXR ,likely developed VAP, Finished ABx Zosyn, meropenem, s/p 1 dose of Vanco  - MRSA negative from 3/26  - Sputum Cx growing few gram negative rods, CRE  - Pulm. dr. Ryan  - ID consult Dr Anand       #Pneumothorax and pneumomediastinum: resolved  -X-ray chest: tiny left apical pneumothorax  -Thoracic surgery following  -s/p Chest tube placed 4/8 pigtail to wall suction.  d/c'd 4/15  -X-ray monitoring daily, PTX resolved      ==================ID===================================  Likely bacterial pneumonia at present  -leukocytosis resolved  -No more fevers, On tylenol PRN only   - Finished Meropenem  - C-procal elevated  -plan as above     ================= Nephro================================  -pitting edema to both lower arms  -on condom cath   -monitor I/Os , good urine output overall  -s/p Albumin x 2 days on 4/10  -Lasix 40mg BID with Albumin 25% Q6 for 48 hours to help with urine output and fluid status.( 4/15) Albumin finished , was D/jennifer on 4/19, urine output decreasing  - Patient can get Lasix as needed   - Metabolic Alkalosis likely due to compensation     =================GI====================================  Transaminitis:   likely secondary to Covid19   - and  on presentation   hepatitis panel -ve  -Improved  -continue to monitor LFT    diet:   NGT (3/29) with tube feed  bowel regimen   Last BM 4/22  Plan for PEG       ================ Heme==================================  Elevated d-dimer: likely secondary to Covid  -d-dimer 423 on admission  -last D-dimer 2160, trending down   -continue prophylactic Lovenox 40 mg daily    Anemia  Hgb 8.8, no active signs of bleeding  will transfuse  if hgb drops.     =================Endocrine===============================  Prediabetes:    -A1c  5.8  -BS controlled  -continue HSS  -monitor FS while on steroids    Abnormal TSH:  -TSH level noted 0.26,   -repeat TSH 0.37 and Free T4 1.82    ================= Skin/Catheters============================  No rashes. Peripheral IV lines.   RIJ 4/13  RRA 3/29, Removed  RBA 4/20    - =================Prophylaxis =============================  Lovenox for DVT proph : Lovenox 40mg SQ daily  Protonix for  GI proph    ==================GOC==================================   FULL CODE

## 2021-04-23 NOTE — PROGRESS NOTE ADULT - SUBJECTIVE AND OBJECTIVE BOX
Pt is sedated. On MV - via trach. For PEG placement today. Surgicel removed.     INTERVAL HPI/OVERNIGHT EVENTS:    VITAL SIGNS:  T(F): 98.5 (04-23-21 @ 07:00)  HR: 112 (04-23-21 @ 12:10)  BP: --  RR: 35 (04-23-21 @ 09:45)  SpO2: 95% (04-23-21 @ 12:10)  Wt(kg): --  I&O's Detail    22 Apr 2021 07:01  -  23 Apr 2021 07:00  --------------------------------------------------------  IN:    Enteral Tube Flush: 270 mL    FentaNYL: 502.5 mL    Glucerna 1.5: 400 mL    IV PiggyBack: 200 mL    IV PiggyBack: 50 mL    IV PiggyBack: 583.1 mL    Midazolam: 130 mL    Propofol: 496.4 mL  Total IN: 2632 mL    OUT:    Chest Tube (mL): 300 mL    Incontinent per Condom Catheter (mL): 1850 mL    Phenylephrine: 0 mL  Total OUT: 2150 mL    Total NET: 482 mL        Mode: AC/ CMV (Assist Control/ Continuous Mandatory Ventilation)  RR (machine): 28  TV (machine): 430  FiO2: 50  PEEP: 3  ITime: 0.7  MAP: 11  PIP: 41        REVIEW OF SYSTEMS:    Unable to assess.     PHYSICAL EXAM:    Constitutional: Well developed and nourished  Eyes:Perrla  ENMT: normal  Neck:supple  Respiratory: trach - MV   Cardiovascular: S1 S2 regular  Gastrointestinal: NGT   Extremities: No edema  Vascular:normal  Neurological: sedated  Musculoskeletal:Normal      MEDICATIONS  (STANDING):  acetaZOLAMIDE  IVPB 250 milliGRAM(s) IV Intermittent every 12 hours  ascorbic acid 1000 milliGRAM(s) Oral daily  aspirin  chewable 81 milliGRAM(s) Oral daily  chlorhexidine 0.12% Liquid 15 milliLiter(s) Oral Mucosa two times a day  chlorhexidine 2% Cloths 1 Application(s) Topical <User Schedule>  fentaNYL   Infusion 5 MICROgram(s)/kG/Hr (41.9 mL/Hr) IV Continuous <Continuous>  insulin lispro (ADMELOG) corrective regimen sliding scale   SubCutaneous every 6 hours  midazolam Infusion 0.13 mG/kG/Hr (10.9 mL/Hr) IV Continuous <Continuous>  midodrine. 10 milliGRAM(s) Oral three times a day  pantoprazole  Injectable 40 milliGRAM(s) IV Push daily  phenylephrine    Infusion 1.5 MICROgram(s)/kG/Min (23.6 mL/Hr) IV Continuous <Continuous>  polyethylene glycol 3350 17 Gram(s) Oral daily  potassium chloride  10 mEq/100 mL IVPB 10 milliEquivalent(s) IV Intermittent every 1 hour  propofol Infusion 10 MICROgram(s)/kG/Min (5.03 mL/Hr) IV Continuous <Continuous>  senna Syrup 10 milliLiter(s) Oral at bedtime    MEDICATIONS  (PRN):  acetaminophen    Suspension .. 650 milliGRAM(s) Oral every 6 hours PRN Temp greater or equal to 38C (100.4F)  ALBUTerol    90 MICROgram(s) HFA Inhaler 2 Puff(s) Inhalation every 6 hours PRN Shortness of Breath and/or Wheezing  artificial  tears Solution 1 Drop(s) Both EYES every 4 hours PRN Dry Eyes  sodium chloride 0.9% lock flush 10 milliLiter(s) IV Push every 1 hour PRN Pre/post blood products, medications, blood draw, and to maintain line patency      Allergies    No Known Allergies    Intolerances        LABS:                        8.1    9.41  )-----------( 339      ( 23 Apr 2021 06:21 )             25.6     04-23    147<H>  |  106  |  12  ----------------------------<  112<H>  3.8   |  37<H>  |  <0.20<L>    Ca    7.9<L>      23 Apr 2021 06:21  Phos  3.4     04-23  Mg     2.2     04-23    TPro  6.2  /  Alb  2.1<L>  /  TBili  0.5  /  DBili  x   /  AST  32  /  ALT  20  /  AlkPhos  93  04-23    PT/INR - ( 22 Apr 2021 03:55 )   PT: 13.3 sec;   INR: 1.12 ratio         PTT - ( 22 Apr 2021 03:55 )  PTT:36.4 sec    ABG - ( 23 Apr 2021 04:57 )  pH, Arterial: 7.40  pH, Blood: x     /  pCO2: 72    /  pO2: 102   / HCO3: 45    / Base Excess: 16.0  /  SaO2: 99                    CAPILLARY BLOOD GLUCOSE      POCT Blood Glucose.: 112 mg/dL (23 Apr 2021 11:08)  POCT Blood Glucose.: 87 mg/dL (22 Apr 2021 23:33)  POCT Blood Glucose.: 109 mg/dL (22 Apr 2021 16:09)    pro-bnp -- 04-23 @ 06:21     d-dimer 1097  04-23 @ 06:21  pro-bnp -- 04-22 @ 03:55     d-dimer 1367  04-22 @ 03:55  pro-bnp -- 04-21 @ 03:58     d-dimer 2160  04-21 @ 03:58  pro-bnp -- 04-20 @ 03:50     d-dimer 2911  04-20 @ 03:50  pro-bnp -- 04-19 @ 04:14     d-dimer 2394  04-19 @ 04:14  pro-bnp -- 04-18 @ 06:39     d-dimer 1778  04-18 @ 06:39      RADIOLOGY & ADDITIONAL TESTS:    CXR:  IMPRESSION: Unchanged chest with advanced infiltrates.    Ct scan chest:    ekg;    echo:

## 2021-04-23 NOTE — PROGRESS NOTE ADULT - ASSESSMENT
Assessment and Recommendation:   Problem/Recommendation - 1:  Problem: COVID-19. Recommendation: isolation precautions  Cont mechanical ventilation - S.P trach.   Wean as tolerated  Pulmonary toilet  Supplemental nutrition  ICU management.   Monitor oxygen sat  Monitor LFT, LDH, CRP, D-Dimer, Ferritin and procalcitonin  Vit C, D and zinc supp  Montelukast 10 mgs po Qhs  Daily CXR   PEG today.   DVT and GI PPX     Problem/Recommendation - 2:  ·  Problem: UTI (urinary tract infection).  Recommendation: F.U cultures   Off Antibiotics.     Problem/Recommendation - 3:  ·  Problem: Chest pain.  Recommendation: likely related to Covid-19 infection.     Problem/Recommendation - 4:  ·  Problem: Transaminitis.  Recommendation: monitor LFTs  GI F/U     Problem/Recommendation - 5:  ·  Problem: Pneumothorax.  Recommendation: Surgicel removed.   Thoracic sx follow up  Daily  CXR   Management per ICU

## 2021-04-23 NOTE — PROGRESS NOTE ADULT - SUBJECTIVE AND OBJECTIVE BOX
Patient is a 55y old  Male who presents with a chief complaint of SOB (22 Apr 2021 18:28)    pt seen in icu [ x ], reg med floor [   ], bed [ x ], chair at bedside [   ], a+o x3 [  ], sedated [x  ],  nad [x  ]    andrea [ x ], ngt feed [x  ], et tube [ x ], cent line [x  ],          Allergies    No Known Allergies        Vitals    T(F): 98.7 (04-23-21 @ 06:00), Max: 100.8 (04-22-21 @ 15:00)  HR: 97 (04-23-21 @ 06:15) (90 - 131)  BP: --  RR: 12 (04-23-21 @ 06:15) (12 - 37)  SpO2: 97% (04-23-21 @ 06:15) (83% - 100%)  Wt(kg): --  CAPILLARY BLOOD GLUCOSE      POCT Blood Glucose.: 87 mg/dL (22 Apr 2021 23:33)      Labs                          8.1    9.41  )-----------( 339      ( 23 Apr 2021 06:21 )             25.6       04-22    147<H>  |  104  |  14  ----------------------------<  111<H>  3.3<L>   |  42<H>  |  0.20<L>    Ca    7.8<L>      22 Apr 2021 17:30  Phos  1.6     04-22  Mg     2.0     04-22    TPro  5.7<L>  /  Alb  2.2<L>  /  TBili  0.4  /  DBili  x   /  AST  24  /  ALT  21  /  AlkPhos  86  04-22            .Sputum Sputum  04-16 @ 04:42   Moderate Enterobacter aerogenes (Carbapenem Resistant)  Normal Respiratory Monserrat present  --  Enterobacter aerogenes (Carbapenem Resistant)      .Urine Clean Catch (Midstream)  03-15 @ 00:52   No growth  --  --          Radiology Results      Meds    MEDICATIONS  (STANDING):  acetaZOLAMIDE  IVPB 250 milliGRAM(s) IV Intermittent every 12 hours  ascorbic acid 1000 milliGRAM(s) Oral daily  aspirin  chewable 81 milliGRAM(s) Oral daily  chlorhexidine 0.12% Liquid 15 milliLiter(s) Oral Mucosa two times a day  chlorhexidine 2% Cloths 1 Application(s) Topical <User Schedule>  fentaNYL   Infusion 5 MICROgram(s)/kG/Hr (41.9 mL/Hr) IV Continuous <Continuous>  insulin lispro (ADMELOG) corrective regimen sliding scale   SubCutaneous every 6 hours  midazolam Infusion 0.13 mG/kG/Hr (10.9 mL/Hr) IV Continuous <Continuous>  midodrine. 10 milliGRAM(s) Oral three times a day  pantoprazole  Injectable 40 milliGRAM(s) IV Push daily  phenylephrine    Infusion 1.5 MICROgram(s)/kG/Min (23.6 mL/Hr) IV Continuous <Continuous>  polyethylene glycol 3350 17 Gram(s) Oral daily  propofol Infusion 10 MICROgram(s)/kG/Min (5.03 mL/Hr) IV Continuous <Continuous>  senna 2 Tablet(s) Oral at bedtime      MEDICATIONS  (PRN):  acetaminophen    Suspension .. 650 milliGRAM(s) Oral every 6 hours PRN Temp greater or equal to 38C (100.4F)  ALBUTerol    90 MICROgram(s) HFA Inhaler 2 Puff(s) Inhalation every 6 hours PRN Shortness of Breath and/or Wheezing  artificial  tears Solution 1 Drop(s) Both EYES every 4 hours PRN Dry Eyes  sodium chloride 0.9% lock flush 10 milliLiter(s) IV Push every 1 hour PRN Pre/post blood products, medications, blood draw, and to maintain line patency      Physical Exam    Neuro :  no focal deficits  Respiratory: CTA B/L  CV: RRR, S1S2, no murmurs,   Abdominal: Soft, NT, ND +BS,  Extremities: No edema, + peripheral pulses      ASSESSMENT    Hypoxemia 2nd to covid pna   transaminitis  prediabetes  h/o appendectomy  cholecystectomy        PLAN    contact and airborne isolation  d/c remdesevir given covid ab positive noted   completed dexamethasone   started pulse steroids for 3 days - 250mg solumedrol bid now tapered off 4/14/21  cont asa, vit c,    cont albuterol inhaler   pulm f/u  procalcitonin, D-dimer, crp, ldh, ferritin, lactate noted ,    tmx 99.2  cont tylenol prn,   cont robitussin prn   pt on fentanyl, phenylephrine, midazolam, propofol drip  s/p intubation 3/29/21   s/p tracheostomy 4/21/21  O2 sat (91% - 98%) mech vent 60%  O2 via mech vent and taper fio2 as tolerated   vent mgmt as per icu  xray 3/19/21 with pneumomediastinum  rept cxr with New trace right apical pneumothorax. New mild left apical pneumothorax. Grossly stable small pneumomediastinum.  Soft tissue emphysema at the neck bases bilaterally. Grossly stable bilateral pulmonary infiltrates noted.   cxr 2/24 with No evidence of pneumothorax can be appreciated on the available image. This may be related to patient positioning. Evidence of pneumomediastinum and subcutaneous emphysema in the lower neck is again noted. There are patchy bibasilar infiltrates and elevated right hemidiaphragm noted.   cxr 3/29/21 with No significant change bilateral infiltrates. There is a small simple left apical pneumothorax. No significant pleural effusion. Bilateral subcutaneous emphysema similar to prior.   XRs on 7AM and 5PM with no obvious increase of ptx  cxr 4/4/21 with Improving bilateral airspace disease noted    cxr 4/8/21 with Small left pneumothorax noted.  thoracic surg f/u   s/p L chest tube replacement 4/18/21 for recurrence of left pneumothorax   cxr 4/20/21 with Unchanged advanced infiltrates and catheter left chest tube noted   CXR 4/11/21 with : No interval change compared to one day prior. No pneumothorax noted.   Daily CXR  Monitor O2 status   s/p tracheostomy 4/21/21   cxr 4/21/21 with No evidence of active chest disease. Tracheostomy tube in place otherwise no significant change noted above  surg eval for gastrostomy placement noted  thoracic team unable to place gastrostomy tube percutaneously.   plan for open gastrostomy tube 4/23/2021.  id f/u   No need for further antibiotics as patient completed course of Meropenem  leukocytosis resolved  speutum cx with Enterobacter aerogenes (Carbapenem Resistant) noted above  andrea   lispro ss   prognosis poor  cont current meds  mgmt as per icu          Patient is a 55y old  Male who presents with a chief complaint of SOB (22 Apr 2021 18:28)    pt seen in icu [ x ], reg med floor [   ], bed [ x ], chair at bedside [   ], a+o x3 [  ], sedated [x  ],  nad [x  ]    andrea [ x ], ngt feed [x  ], et tube [ x ], cent line [x  ],          Allergies    No Known Allergies        Vitals    T(F): 98.7 (04-23-21 @ 06:00), Max: 100.8 (04-22-21 @ 15:00)  HR: 97 (04-23-21 @ 06:15) (90 - 131)  BP: --  RR: 12 (04-23-21 @ 06:15) (12 - 37)  SpO2: 97% (04-23-21 @ 06:15) (83% - 100%)  Wt(kg): --  CAPILLARY BLOOD GLUCOSE      POCT Blood Glucose.: 87 mg/dL (22 Apr 2021 23:33)      Labs                          8.1    9.41  )-----------( 339      ( 23 Apr 2021 06:21 )             25.6       04-22    147<H>  |  104  |  14  ----------------------------<  111<H>  3.3<L>   |  42<H>  |  0.20<L>    Ca    7.8<L>      22 Apr 2021 17:30  Phos  1.6     04-22  Mg     2.0     04-22    TPro  5.7<L>  /  Alb  2.2<L>  /  TBili  0.4  /  DBili  x   /  AST  24  /  ALT  21  /  AlkPhos  86  04-22            .Sputum Sputum  04-16 @ 04:42   Moderate Enterobacter aerogenes (Carbapenem Resistant)  Normal Respiratory Monserrat present  --  Enterobacter aerogenes (Carbapenem Resistant)      .Urine Clean Catch (Midstream)  03-15 @ 00:52   No growth  --  --          Radiology Results      Meds    MEDICATIONS  (STANDING):  acetaZOLAMIDE  IVPB 250 milliGRAM(s) IV Intermittent every 12 hours  ascorbic acid 1000 milliGRAM(s) Oral daily  aspirin  chewable 81 milliGRAM(s) Oral daily  chlorhexidine 0.12% Liquid 15 milliLiter(s) Oral Mucosa two times a day  chlorhexidine 2% Cloths 1 Application(s) Topical <User Schedule>  fentaNYL   Infusion 5 MICROgram(s)/kG/Hr (41.9 mL/Hr) IV Continuous <Continuous>  insulin lispro (ADMELOG) corrective regimen sliding scale   SubCutaneous every 6 hours  midazolam Infusion 0.13 mG/kG/Hr (10.9 mL/Hr) IV Continuous <Continuous>  midodrine. 10 milliGRAM(s) Oral three times a day  pantoprazole  Injectable 40 milliGRAM(s) IV Push daily  phenylephrine    Infusion 1.5 MICROgram(s)/kG/Min (23.6 mL/Hr) IV Continuous <Continuous>  polyethylene glycol 3350 17 Gram(s) Oral daily  propofol Infusion 10 MICROgram(s)/kG/Min (5.03 mL/Hr) IV Continuous <Continuous>  senna 2 Tablet(s) Oral at bedtime      MEDICATIONS  (PRN):  acetaminophen    Suspension .. 650 milliGRAM(s) Oral every 6 hours PRN Temp greater or equal to 38C (100.4F)  ALBUTerol    90 MICROgram(s) HFA Inhaler 2 Puff(s) Inhalation every 6 hours PRN Shortness of Breath and/or Wheezing  artificial  tears Solution 1 Drop(s) Both EYES every 4 hours PRN Dry Eyes  sodium chloride 0.9% lock flush 10 milliLiter(s) IV Push every 1 hour PRN Pre/post blood products, medications, blood draw, and to maintain line patency      Physical Exam    Neuro :  no focal deficits  Respiratory: CTA B/L  CV: RRR, S1S2, no murmurs,   Abdominal: Soft, NT, ND +BS,  Extremities: No edema, + peripheral pulses      ASSESSMENT    Hypoxemia 2nd to covid pna   transaminitis  prediabetes  h/o appendectomy  cholecystectomy        PLAN    contact and airborne isolation  d/c remdesevir given covid ab positive noted   completed dexamethasone   started pulse steroids for 3 days - 250mg solumedrol bid now tapered off 4/14/21  cont asa, vit c,    cont albuterol inhaler   pulm f/u  procalcitonin, D-dimer, crp, ldh, ferritin, lactate noted ,    tmx 100.8  cont tylenol prn,   cont robitussin prn   pt on fentanyl, phenylephrine, midazolam, propofol drip  s/p intubation 3/29/21   s/p tracheostomy 4/21/21  O2 sat (83% - 100%) mech vent 60%  O2 via mech vent and taper fio2 as tolerated   vent mgmt as per icu  xray 3/19/21 with pneumomediastinum  rept cxr with New trace right apical pneumothorax. New mild left apical pneumothorax. Grossly stable small pneumomediastinum.  Soft tissue emphysema at the neck bases bilaterally. Grossly stable bilateral pulmonary infiltrates noted.   cxr 2/24 with No evidence of pneumothorax can be appreciated on the available image. This may be related to patient positioning. Evidence of pneumomediastinum and subcutaneous emphysema in the lower neck is again noted. There are patchy bibasilar infiltrates and elevated right hemidiaphragm noted.   cxr 3/29/21 with No significant change bilateral infiltrates. There is a small simple left apical pneumothorax. No significant pleural effusion. Bilateral subcutaneous emphysema similar to prior.   XRs on 7AM and 5PM with no obvious increase of ptx  cxr 4/4/21 with Improving bilateral airspace disease noted    cxr 4/8/21 with Small left pneumothorax noted.  thoracic surg f/u   s/p L chest tube replacement 4/18/21 for recurrence of left pneumothorax   cxr 4/20/21 with Unchanged advanced infiltrates and catheter left chest tube noted   CXR 4/11/21 with : No interval change compared to one day prior. No pneumothorax noted.   Daily CXR  Monitor O2 status   s/p tracheostomy 4/21/21   stay sutures out 2 weeks from OR date  cxr 4/21/21 with No evidence of active chest disease. Tracheostomy tube in place otherwise no significant change noted above  surg eval for gastrostomy placement noted  thoracic team unable to place gastrostomy tube percutaneously.   plan for open gastrostomy tube today 4/23/2021.  id f/u   No need for further antibiotics as patient completed course of Meropenem  leukocytosis resolved  speutum cx with Enterobacter aerogenes (Carbapenem Resistant) noted above  andrea   lispro ss   prognosis poor  cont current meds  mgmt as per icu

## 2021-04-23 NOTE — PROGRESS NOTE ADULT - ATTENDING COMMENTS
55 yr old  man , non smoker with  moody 1990s presented 3/14 with x9 days worsening cough, subjective fevers, and SOB, with x2-3 days dysuria and central, non-radiating, constant CP. Admitted to medicine unit  for acute hypoxic respiratory failure secondary to pna from covid-19 infection .     Assessment:  1. Acute hypoxic respiratory failure  2. Covid-19 infection   3. Transaminitis  4. Prediabetes  5. Bilateral pneumothorax  6. Septic shock     Plan   -S/p tracheostomy placement 4/21   -Completed antibiotics  -thoracic f/u noted   -adjust vent as per ABG, repeat ABG improved  -permissive hypercapnia   -S/p diamox as patient with metabolic alkalosis  -PTX  improved post chest tube placement, place chest tube to water seal  -Cont. versed/fentanyl as patient asynchronous with the vent  -Cont. midodrine  -monitor biomarkers daily, trending down   -Tube feedings held as plan for PEG today  -dvt/gi prophy  -hemodynamic monitoring   -Prognosis is guarded

## 2021-04-24 LAB
ALBUMIN SERPL ELPH-MCNC: 2.1 G/DL — LOW (ref 3.5–5)
ALP SERPL-CCNC: 98 U/L — SIGNIFICANT CHANGE UP (ref 40–120)
ALT FLD-CCNC: 19 U/L DA — SIGNIFICANT CHANGE UP (ref 10–60)
ANION GAP SERPL CALC-SCNC: 5 MMOL/L — SIGNIFICANT CHANGE UP (ref 5–17)
AST SERPL-CCNC: 21 U/L — SIGNIFICANT CHANGE UP (ref 10–40)
BASE EXCESS BLDA CALC-SCNC: 7.6 MMOL/L — HIGH (ref -2–3)
BASOPHILS # BLD AUTO: 0.06 K/UL — SIGNIFICANT CHANGE UP (ref 0–0.2)
BASOPHILS NFR BLD AUTO: 0.4 % — SIGNIFICANT CHANGE UP (ref 0–2)
BILIRUB SERPL-MCNC: 0.5 MG/DL — SIGNIFICANT CHANGE UP (ref 0.2–1.2)
BLOOD GAS COMMENTS ARTERIAL: SIGNIFICANT CHANGE UP
BUN SERPL-MCNC: 11 MG/DL — SIGNIFICANT CHANGE UP (ref 7–18)
CALCIUM SERPL-MCNC: 8.1 MG/DL — LOW (ref 8.4–10.5)
CHLORIDE SERPL-SCNC: 107 MMOL/L — SIGNIFICANT CHANGE UP (ref 96–108)
CO2 SERPL-SCNC: 33 MMOL/L — HIGH (ref 22–31)
CREAT SERPL-MCNC: <0.2 MG/DL — LOW (ref 0.5–1.3)
EOSINOPHIL # BLD AUTO: 0.16 K/UL — SIGNIFICANT CHANGE UP (ref 0–0.5)
EOSINOPHIL NFR BLD AUTO: 1.1 % — SIGNIFICANT CHANGE UP (ref 0–6)
GLUCOSE SERPL-MCNC: 98 MG/DL — SIGNIFICANT CHANGE UP (ref 70–99)
HCO3 BLDA-SCNC: 34 MMOL/L — HIGH (ref 21–28)
HCT VFR BLD CALC: 24.3 % — LOW (ref 39–50)
HGB BLD-MCNC: 7.8 G/DL — LOW (ref 13–17)
HOROWITZ INDEX BLDA+IHG-RTO: 50 — SIGNIFICANT CHANGE UP
IMM GRANULOCYTES NFR BLD AUTO: 4.4 % — HIGH (ref 0–1.5)
LYMPHOCYTES # BLD AUTO: 16.2 % — SIGNIFICANT CHANGE UP (ref 13–44)
LYMPHOCYTES # BLD AUTO: 2.31 K/UL — SIGNIFICANT CHANGE UP (ref 1–3.3)
MAGNESIUM SERPL-MCNC: 1.9 MG/DL — SIGNIFICANT CHANGE UP (ref 1.6–2.6)
MCHC RBC-ENTMCNC: 32.1 GM/DL — SIGNIFICANT CHANGE UP (ref 32–36)
MCHC RBC-ENTMCNC: 35.1 PG — HIGH (ref 27–34)
MCV RBC AUTO: 109.5 FL — HIGH (ref 80–100)
MONOCYTES # BLD AUTO: 0.62 K/UL — SIGNIFICANT CHANGE UP (ref 0–0.9)
MONOCYTES NFR BLD AUTO: 4.4 % — SIGNIFICANT CHANGE UP (ref 2–14)
NEUTROPHILS # BLD AUTO: 10.44 K/UL — HIGH (ref 1.8–7.4)
NEUTROPHILS NFR BLD AUTO: 73.5 % — SIGNIFICANT CHANGE UP (ref 43–77)
NRBC # BLD: 0 /100 WBCS — SIGNIFICANT CHANGE UP (ref 0–0)
PCO2 BLDA: 57 MMHG — HIGH (ref 35–48)
PH BLDA: 7.39 — SIGNIFICANT CHANGE UP (ref 7.35–7.45)
PHOSPHATE SERPL-MCNC: 2.4 MG/DL — LOW (ref 2.5–4.5)
PLATELET # BLD AUTO: 319 K/UL — SIGNIFICANT CHANGE UP (ref 150–400)
PO2 BLDA: 71 MMHG — LOW (ref 83–108)
POTASSIUM SERPL-MCNC: 3.7 MMOL/L — SIGNIFICANT CHANGE UP (ref 3.5–5.3)
POTASSIUM SERPL-SCNC: 3.7 MMOL/L — SIGNIFICANT CHANGE UP (ref 3.5–5.3)
PROT SERPL-MCNC: 6.1 G/DL — SIGNIFICANT CHANGE UP (ref 6–8.3)
RBC # BLD: 2.22 M/UL — LOW (ref 4.2–5.8)
RBC # FLD: 16.9 % — HIGH (ref 10.3–14.5)
SAO2 % BLDA: 96 % — SIGNIFICANT CHANGE UP
SODIUM SERPL-SCNC: 145 MMOL/L — SIGNIFICANT CHANGE UP (ref 135–145)
WBC # BLD: 14.22 K/UL — HIGH (ref 3.8–10.5)
WBC # FLD AUTO: 14.22 K/UL — HIGH (ref 3.8–10.5)

## 2021-04-24 PROCEDURE — 71045 X-RAY EXAM CHEST 1 VIEW: CPT | Mod: 26

## 2021-04-24 RX ORDER — CHLORHEXIDINE GLUCONATE 213 G/1000ML
15 SOLUTION TOPICAL EVERY 12 HOURS
Refills: 0 | Status: DISCONTINUED | OUTPATIENT
Start: 2021-04-24 | End: 2021-05-03

## 2021-04-24 RX ORDER — FENTANYL CITRATE 50 UG/ML
1 INJECTION INTRAVENOUS
Refills: 0 | Status: DISCONTINUED | OUTPATIENT
Start: 2021-04-24 | End: 2021-04-28

## 2021-04-24 RX ORDER — DIAZEPAM 5 MG
5 TABLET ORAL EVERY 6 HOURS
Refills: 0 | Status: DISCONTINUED | OUTPATIENT
Start: 2021-04-24 | End: 2021-04-29

## 2021-04-24 RX ORDER — POTASSIUM PHOSPHATE, MONOBASIC POTASSIUM PHOSPHATE, DIBASIC 236; 224 MG/ML; MG/ML
15 INJECTION, SOLUTION INTRAVENOUS ONCE
Refills: 0 | Status: COMPLETED | OUTPATIENT
Start: 2021-04-24 | End: 2021-04-24

## 2021-04-24 RX ORDER — MAGNESIUM SULFATE 500 MG/ML
1 VIAL (ML) INJECTION ONCE
Refills: 0 | Status: COMPLETED | OUTPATIENT
Start: 2021-04-24 | End: 2021-04-24

## 2021-04-24 RX ORDER — DEXMEDETOMIDINE HYDROCHLORIDE IN 0.9% SODIUM CHLORIDE 4 UG/ML
0.2 INJECTION INTRAVENOUS
Qty: 400 | Refills: 0 | Status: DISCONTINUED | OUTPATIENT
Start: 2021-04-24 | End: 2021-04-26

## 2021-04-24 RX ADMIN — FENTANYL CITRATE 25.2 MICROGRAM(S)/KG/HR: 50 INJECTION INTRAVENOUS at 14:39

## 2021-04-24 RX ADMIN — ACETAZOLAMIDE 105 MILLIGRAM(S): 250 TABLET ORAL at 05:57

## 2021-04-24 RX ADMIN — Medication 100 GRAM(S): at 14:40

## 2021-04-24 RX ADMIN — Medication 5 MILLIGRAM(S): at 17:54

## 2021-04-24 RX ADMIN — ACETAZOLAMIDE 105 MILLIGRAM(S): 250 TABLET ORAL at 18:41

## 2021-04-24 RX ADMIN — FENTANYL CITRATE 1 PATCH: 50 INJECTION INTRAVENOUS at 19:00

## 2021-04-24 RX ADMIN — CHLORHEXIDINE GLUCONATE 15 MILLILITER(S): 213 SOLUTION TOPICAL at 18:42

## 2021-04-24 RX ADMIN — FENTANYL CITRATE 1 PATCH: 50 INJECTION INTRAVENOUS at 14:39

## 2021-04-24 RX ADMIN — PROPOFOL 5.03 MICROGRAM(S)/KG/MIN: 10 INJECTION, EMULSION INTRAVENOUS at 02:07

## 2021-04-24 RX ADMIN — DEXMEDETOMIDINE HYDROCHLORIDE IN 0.9% SODIUM CHLORIDE 3.9 MICROGRAM(S)/KG/HR: 4 INJECTION INTRAVENOUS at 12:30

## 2021-04-24 RX ADMIN — FENTANYL CITRATE 25.2 MICROGRAM(S)/KG/HR: 50 INJECTION INTRAVENOUS at 21:26

## 2021-04-24 RX ADMIN — MIDAZOLAM HYDROCHLORIDE 4.95 MG/KG/HR: 1 INJECTION, SOLUTION INTRAMUSCULAR; INTRAVENOUS at 06:30

## 2021-04-24 RX ADMIN — FENTANYL CITRATE 25.2 MICROGRAM(S)/KG/HR: 50 INJECTION INTRAVENOUS at 08:57

## 2021-04-24 RX ADMIN — DEXMEDETOMIDINE HYDROCHLORIDE IN 0.9% SODIUM CHLORIDE 3.9 MICROGRAM(S)/KG/HR: 4 INJECTION INTRAVENOUS at 05:55

## 2021-04-24 RX ADMIN — MIDODRINE HYDROCHLORIDE 10 MILLIGRAM(S): 2.5 TABLET ORAL at 05:37

## 2021-04-24 RX ADMIN — PANTOPRAZOLE SODIUM 40 MILLIGRAM(S): 20 TABLET, DELAYED RELEASE ORAL at 11:27

## 2021-04-24 RX ADMIN — DEXMEDETOMIDINE HYDROCHLORIDE IN 0.9% SODIUM CHLORIDE 3.9 MICROGRAM(S)/KG/HR: 4 INJECTION INTRAVENOUS at 21:26

## 2021-04-24 RX ADMIN — POTASSIUM PHOSPHATE, MONOBASIC POTASSIUM PHOSPHATE, DIBASIC 62.5 MILLIMOLE(S): 236; 224 INJECTION, SOLUTION INTRAVENOUS at 08:36

## 2021-04-24 RX ADMIN — CHLORHEXIDINE GLUCONATE 1 APPLICATION(S): 213 SOLUTION TOPICAL at 05:37

## 2021-04-24 RX ADMIN — ENOXAPARIN SODIUM 40 MILLIGRAM(S): 100 INJECTION SUBCUTANEOUS at 11:27

## 2021-04-24 RX ADMIN — FENTANYL CITRATE 25.2 MICROGRAM(S)/KG/HR: 50 INJECTION INTRAVENOUS at 02:07

## 2021-04-24 RX ADMIN — MIDAZOLAM HYDROCHLORIDE 4.95 MG/KG/HR: 1 INJECTION, SOLUTION INTRAMUSCULAR; INTRAVENOUS at 21:26

## 2021-04-24 RX ADMIN — CHLORHEXIDINE GLUCONATE 15 MILLILITER(S): 213 SOLUTION TOPICAL at 05:37

## 2021-04-24 NOTE — PROGRESS NOTE ADULT - SUBJECTIVE AND OBJECTIVE BOX
s/p open gastrostomy tube POD#1  Patient seen and examined at bedside in ICU    Vital Signs Last 24 Hrs  T(F): 99.8 (04-24-21 @ 06:00), Max: 99.9 (04-23-21 @ 20:00)  HR: 96 (04-24-21 @ 08:30)  BP: 104/65 (04-23-21 @ 15:16)  RR: 27 (04-24-21 @ 08:30)  SpO2: 97% (04-24-21 @ 08:30)  POCT Blood Glucose.: 112 mg/dL (23 Apr 2021 11:08)    GENERAL: Alert, NAD  CHEST/LUNG: on vent via trach  ABDOMEN: G-tube in place, abdomen soft.     I&O's Detail    23 Apr 2021 07:01  -  24 Apr 2021 07:00  --------------------------------------------------------  IN:    Dexmedetomidine: 21 mL    FentaNYL: 201.6 mL    FentaNYL: 411.4 mL    IV PiggyBack: 50 mL    Midazolam: 40 mL    Midazolam: 60 mL    Propofol: 100.8 mL    Propofol: 252 mL  Total IN: 1136.8 mL    OUT:    Chest Tube (mL): 100 mL    Incontinent per Condom Catheter (mL): 600 mL  Total OUT: 700 mL    Total NET: 436.8 mL    LABS:                        7.8    14.22 )-----------( 319      ( 24 Apr 2021 05:25 )             24.3     04-24    145  |  107  |  11  ----------------------------<  98  3.7   |  33<H>  |  <0.20<L>    Ca    8.1<L>      24 Apr 2021 05:25  Phos  2.4     04-24  Mg     1.9     04-24    TPro  6.1  /  Alb  2.1<L>  /  TBili  0.5  /  DBili  x   /  AST  21  /  ALT  19  /  AlkPhos  98  04-24       s/p open gastrostomy tube POD#1  Patient seen and examined at bedside in ICU    Vital Signs Last 24 Hrs  T(F): 99.8 (04-24-21 @ 06:00), Max: 99.9 (04-23-21 @ 20:00)  HR: 96 (04-24-21 @ 08:30)  BP: 104/65 (04-23-21 @ 15:16)  RR: 27 (04-24-21 @ 08:30)  SpO2: 97% (04-24-21 @ 08:30)  POCT Blood Glucose.: 112 mg/dL (23 Apr 2021 11:08)    GENERAL: NAD  CHEST/LUNG: on vent via trach  ABDOMEN: G-tube in place, abdomen soft.     I&O's Detail    23 Apr 2021 07:01  -  24 Apr 2021 07:00  --------------------------------------------------------  IN:    Dexmedetomidine: 21 mL    FentaNYL: 201.6 mL    FentaNYL: 411.4 mL    IV PiggyBack: 50 mL    Midazolam: 40 mL    Midazolam: 60 mL    Propofol: 100.8 mL    Propofol: 252 mL  Total IN: 1136.8 mL    OUT:    Chest Tube (mL): 100 mL    Incontinent per Condom Catheter (mL): 600 mL  Total OUT: 700 mL    Total NET: 436.8 mL    LABS:                        7.8    14.22 )-----------( 319      ( 24 Apr 2021 05:25 )             24.3     04-24    145  |  107  |  11  ----------------------------<  98  3.7   |  33<H>  |  <0.20<L>    Ca    8.1<L>      24 Apr 2021 05:25  Phos  2.4     04-24  Mg     1.9     04-24    TPro  6.1  /  Alb  2.1<L>  /  TBili  0.5  /  DBili  x   /  AST  21  /  ALT  19  /  AlkPhos  98  04-24

## 2021-04-24 NOTE — PROGRESS NOTE ADULT - ASSESSMENT
Assessment and Recommendation:   Problem/Recommendation - 1:  Problem: COVID-19. Recommendation: isolation precautions  Cont mechanical ventilation - S.P trach.   Wean as tolerated  Pulmonary toilet  Supplemental nutrition  ICU management.   Monitor oxygen sat  Monitor LFT, LDH, CRP, D-Dimer, Ferritin and procalcitonin  Vit C, D and zinc supp  Montelukast 10 mgs po Qhs  Daily CXR   G-tube placed; to start feeds.   DVT and GI PPX     Problem/Recommendation - 2:  ·  Problem: UTI (urinary tract infection).  Recommendation: F.U cultures   Off Antibiotics.     Problem/Recommendation - 3:  ·  Problem: Chest pain.  Recommendation: likely related to Covid-19 infection.     Problem/Recommendation - 4:  ·  Problem: Transaminitis.  Recommendation: monitor LFTs  GI F/U     Problem/Recommendation - 5:  ·  Problem: Pneumothorax.  Recommendation: Surgicel removed.   Thoracic sx follow up  Daily  CXR   Management per ICU

## 2021-04-24 NOTE — PROGRESS NOTE ADULT - SUBJECTIVE AND OBJECTIVE BOX
INTERVAL HPI/OVERNIGHT EVENTS: Patient s/p PEG placement on 4/23, NPO for now. No overnight events.    PRESSORS: [ ] YES [ x] NO  WHICH:      Antimicrobial:    Cardiovascular:  acetaZOLAMIDE  IVPB 250 milliGRAM(s) IV Intermittent every 12 hours  midodrine. 10 milliGRAM(s) Oral three times a day    Pulmonary:  ALBUTerol    90 MICROgram(s) HFA Inhaler 2 Puff(s) Inhalation every 6 hours PRN    Hematalogic:  aspirin  chewable 81 milliGRAM(s) Oral daily  enoxaparin Injectable 40 milliGRAM(s) SubCutaneous daily    Other:  artificial  tears Solution 1 Drop(s) Both EYES every 4 hours PRN  ascorbic acid 1000 milliGRAM(s) Oral daily  chlorhexidine 0.12% Liquid 15 milliLiter(s) Oral Mucosa two times a day  chlorhexidine 2% Cloths 1 Application(s) Topical <User Schedule>  fentaNYL   Infusion 3 MICROgram(s)/kG/Hr IV Continuous <Continuous>  insulin lispro (ADMELOG) corrective regimen sliding scale   SubCutaneous every 6 hours  midazolam Infusion 0.059 mG/kG/Hr IV Continuous <Continuous>  pantoprazole  Injectable 40 milliGRAM(s) IV Push daily  polyethylene glycol 3350 17 Gram(s) Oral daily  propofol Infusion 9.996 MICROgram(s)/kG/Min IV Continuous <Continuous>  sodium chloride 0.9% lock flush 10 milliLiter(s) IV Push every 1 hour PRN    acetaZOLAMIDE  IVPB 250 milliGRAM(s) IV Intermittent every 12 hours  ALBUTerol    90 MICROgram(s) HFA Inhaler 2 Puff(s) Inhalation every 6 hours PRN  artificial  tears Solution 1 Drop(s) Both EYES every 4 hours PRN  ascorbic acid 1000 milliGRAM(s) Oral daily  aspirin  chewable 81 milliGRAM(s) Oral daily  chlorhexidine 0.12% Liquid 15 milliLiter(s) Oral Mucosa two times a day  chlorhexidine 2% Cloths 1 Application(s) Topical <User Schedule>  enoxaparin Injectable 40 milliGRAM(s) SubCutaneous daily  fentaNYL   Infusion 3 MICROgram(s)/kG/Hr IV Continuous <Continuous>  insulin lispro (ADMELOG) corrective regimen sliding scale   SubCutaneous every 6 hours  midazolam Infusion 0.059 mG/kG/Hr IV Continuous <Continuous>  midodrine. 10 milliGRAM(s) Oral three times a day  pantoprazole  Injectable 40 milliGRAM(s) IV Push daily  polyethylene glycol 3350 17 Gram(s) Oral daily  propofol Infusion 9.996 MICROgram(s)/kG/Min IV Continuous <Continuous>  sodium chloride 0.9% lock flush 10 milliLiter(s) IV Push every 1 hour PRN    Drug Dosing Weight  Height (cm): 167.6 (14 Mar 2021 12:03)  Weight (kg): 78 (23 Apr 2021 15:16)  BMI (kg/m2): 27.8 (23 Apr 2021 15:16)  BSA (m2): 1.88 (23 Apr 2021 15:16)    CENTRAL LINE: [ ] YES [ ] NO  LOCATION:         RIVERA: [ ] YES [ ] NO          A-LINE:  [ ] YES [ ] NO  LOCATION:             ICU Vital Signs Last 24 Hrs  T(C): 37 (24 Apr 2021 00:45), Max: 37.7 (23 Apr 2021 20:00)  T(F): 98.6 (24 Apr 2021 00:45), Max: 99.9 (23 Apr 2021 20:00)  HR: 105 (24 Apr 2021 00:00) (77 - 124)  BP: 104/65 (23 Apr 2021 15:16) (104/65 - 104/65)  BP(mean): --  ABP: 123/68 (24 Apr 2021 00:00) (60/57 - 150/86)  ABP(mean): 91 (24 Apr 2021 00:00) (0 - 114)  RR: 28 (24 Apr 2021 00:00) (12 - 35)  SpO2: 96% (24 Apr 2021 00:00) (87% - 100%)      ABG - ( 23 Apr 2021 04:57 )  pH, Arterial: 7.40  pH, Blood: x     /  pCO2: 72    /  pO2: 102   / HCO3: 45    / Base Excess: 16.0  /  SaO2: 99                    04-22 @ 07:01  -  04-23 @ 07:00  --------------------------------------------------------  IN: 2632 mL / OUT: 2150 mL / NET: 482 mL        Mode: AC/ CMV (Assist Control/ Continuous Mandatory Ventilation)  RR (machine): 28  TV (machine): 430  FiO2: 50  PEEP: 3  ITime: 0.7  MAP: 16  PIP: 45        PHYSICAL EXAM:    GENERAL: NAD, trach on vent   EYES: EOMI, PERRLA  NECK: Supple, No JVD; Normal thyroid; Trachea midline: No LAD   NERVOUS SYSTEM: Sedated  CHEST/LUNG: No rales, rhonchi, wheezing, breath sounds present bilaterally  HEART: Regular rate and rhythm; No murmurs, no gallops  ABDOMEN: Soft, Nontender, Nondistended; Bowel sounds present, no pain or masses on palpation, PEG in place   : condom cath   EXTREMITIES:  2+ Peripheral Pulses, No clubbing, cyanosis, or edema  SKIN: warm, intact, no lesions         LABS:  CBC Full  -  ( 23 Apr 2021 06:21 )  WBC Count : 9.41 K/uL  RBC Count : 2.39 M/uL  Hemoglobin : 8.1 g/dL  Hematocrit : 25.6 %  Platelet Count - Automated : 339 K/uL  Mean Cell Volume : 107.1 fl  Mean Cell Hemoglobin : 33.9 pg  Mean Cell Hemoglobin Concentration : 31.6 gm/dL  Auto Neutrophil # : 6.54 K/uL  Auto Lymphocyte # : 1.68 K/uL  Auto Monocyte # : 0.58 K/uL  Auto Eosinophil # : 0.17 K/uL  Auto Basophil # : 0.03 K/uL  Auto Neutrophil % : 69.4 %  Auto Lymphocyte % : 17.9 %  Auto Monocyte % : 6.2 %  Auto Eosinophil % : 1.8 %  Auto Basophil % : 0.3 %    04-23    147<H>  |  106  |  12  ----------------------------<  112<H>  3.8   |  37<H>  |  <0.20<L>    Ca    7.9<L>      23 Apr 2021 06:21  Phos  3.4     04-23  Mg     2.2     04-23    TPro  6.2  /  Alb  2.1<L>  /  TBili  0.5  /  DBili  x   /  AST  32  /  ALT  20  /  AlkPhos  93  04-23    PT/INR - ( 22 Apr 2021 03:55 )   PT: 13.3 sec;   INR: 1.12 ratio         PTT - ( 22 Apr 2021 03:55 )  PTT:36.4 sec        RADIOLOGY & ADDITIONAL STUDIES REVIEWED:  ***    [ ]GOALS OF CARE DISCUSSION WITH PATIENT/FAMILY/PROXY:    CRITICAL CARE TIME SPENT: 35 minutes   INTERVAL HPI/OVERNIGHT EVENTS: Patient s/p PEG placement on 4/23, NPO for now. No overnight events.    PRESSORS: [ ] YES [ x] NO  WHICH:      Antimicrobial:    Cardiovascular:  acetaZOLAMIDE  IVPB 250 milliGRAM(s) IV Intermittent every 12 hours  midodrine. 10 milliGRAM(s) Oral three times a day    Pulmonary:  ALBUTerol    90 MICROgram(s) HFA Inhaler 2 Puff(s) Inhalation every 6 hours PRN    Hematalogic:  aspirin  chewable 81 milliGRAM(s) Oral daily  enoxaparin Injectable 40 milliGRAM(s) SubCutaneous daily    Other:  artificial  tears Solution 1 Drop(s) Both EYES every 4 hours PRN  ascorbic acid 1000 milliGRAM(s) Oral daily  chlorhexidine 0.12% Liquid 15 milliLiter(s) Oral Mucosa two times a day  chlorhexidine 2% Cloths 1 Application(s) Topical <User Schedule>  fentaNYL   Infusion 3 MICROgram(s)/kG/Hr IV Continuous <Continuous>  insulin lispro (ADMELOG) corrective regimen sliding scale   SubCutaneous every 6 hours  midazolam Infusion 0.059 mG/kG/Hr IV Continuous <Continuous>  pantoprazole  Injectable 40 milliGRAM(s) IV Push daily  polyethylene glycol 3350 17 Gram(s) Oral daily  propofol Infusion 9.996 MICROgram(s)/kG/Min IV Continuous <Continuous>  sodium chloride 0.9% lock flush 10 milliLiter(s) IV Push every 1 hour PRN    acetaZOLAMIDE  IVPB 250 milliGRAM(s) IV Intermittent every 12 hours  ALBUTerol    90 MICROgram(s) HFA Inhaler 2 Puff(s) Inhalation every 6 hours PRN  artificial  tears Solution 1 Drop(s) Both EYES every 4 hours PRN  ascorbic acid 1000 milliGRAM(s) Oral daily  aspirin  chewable 81 milliGRAM(s) Oral daily  chlorhexidine 0.12% Liquid 15 milliLiter(s) Oral Mucosa two times a day  chlorhexidine 2% Cloths 1 Application(s) Topical <User Schedule>  enoxaparin Injectable 40 milliGRAM(s) SubCutaneous daily  fentaNYL   Infusion 3 MICROgram(s)/kG/Hr IV Continuous <Continuous>  insulin lispro (ADMELOG) corrective regimen sliding scale   SubCutaneous every 6 hours  midazolam Infusion 0.059 mG/kG/Hr IV Continuous <Continuous>  midodrine. 10 milliGRAM(s) Oral three times a day  pantoprazole  Injectable 40 milliGRAM(s) IV Push daily  polyethylene glycol 3350 17 Gram(s) Oral daily  propofol Infusion 9.996 MICROgram(s)/kG/Min IV Continuous <Continuous>  sodium chloride 0.9% lock flush 10 milliLiter(s) IV Push every 1 hour PRN    Drug Dosing Weight  Height (cm): 167.6 (14 Mar 2021 12:03)  Weight (kg): 78 (23 Apr 2021 15:16)  BMI (kg/m2): 27.8 (23 Apr 2021 15:16)  BSA (m2): 1.88 (23 Apr 2021 15:16)    CENTRAL LINE: [ ] YES [ ] NO  LOCATION:         RIVERA: [ ] YES [ ] NO          A-LINE:  [ ] YES [ ] NO  LOCATION:             ICU Vital Signs Last 24 Hrs  T(C): 37 (24 Apr 2021 00:45), Max: 37.7 (23 Apr 2021 20:00)  T(F): 98.6 (24 Apr 2021 00:45), Max: 99.9 (23 Apr 2021 20:00)  HR: 105 (24 Apr 2021 00:00) (77 - 124)  BP: 104/65 (23 Apr 2021 15:16) (104/65 - 104/65)  ABP: 123/68 (24 Apr 2021 00:00) (60/57 - 150/86)  ABP(mean): 91 (24 Apr 2021 00:00) (0 - 114)  RR: 28 (24 Apr 2021 00:00) (12 - 35)  SpO2: 96% (24 Apr 2021 00:00) (87% - 100%)      ABG - ( 23 Apr 2021 04:57 )  pH, Arterial: 7.40  pH, Blood: x     /  pCO2: 72    /  pO2: 102   / HCO3: 45    / Base Excess: 16.0  /  SaO2: 99                    04-22 @ 07:01  -  04-23 @ 07:00  --------------------------------------------------------  IN: 2632 mL / OUT: 2150 mL / NET: 482 mL        Mode: AC/ CMV (Assist Control/ Continuous Mandatory Ventilation)  RR (machine): 28  TV (machine): 430  FiO2: 50  PEEP: 3  ITime: 0.7  MAP: 16  PIP: 45        PHYSICAL EXAM:    GENERAL: NAD, trach on vent   EYES: EOMI, PERRLA  NECK: Supple, No JVD; Normal thyroid; Trachea midline: No LAD   NERVOUS SYSTEM: Sedated  CHEST/LUNG: No rales, rhonchi, wheezing, breath sounds present bilaterally  HEART: Regular rate and rhythm; No murmurs, no gallops  ABDOMEN: Soft, Nontender, Nondistended; Bowel sounds present, no pain or masses on palpation, PEG in place   : condom cath   EXTREMITIES:  2+ Peripheral Pulses, No clubbing, cyanosis, or edema  SKIN: warm, intact, no lesions         LABS:  CBC Full  -  ( 23 Apr 2021 06:21 )  WBC Count : 9.41 K/uL  RBC Count : 2.39 M/uL  Hemoglobin : 8.1 g/dL  Hematocrit : 25.6 %  Platelet Count - Automated : 339 K/uL  Mean Cell Volume : 107.1 fl  Mean Cell Hemoglobin : 33.9 pg  Mean Cell Hemoglobin Concentration : 31.6 gm/dL  Auto Neutrophil # : 6.54 K/uL  Auto Lymphocyte # : 1.68 K/uL  Auto Monocyte # : 0.58 K/uL  Auto Eosinophil # : 0.17 K/uL  Auto Basophil # : 0.03 K/uL  Auto Neutrophil % : 69.4 %  Auto Lymphocyte % : 17.9 %  Auto Monocyte % : 6.2 %  Auto Eosinophil % : 1.8 %  Auto Basophil % : 0.3 %    04-23    147<H>  |  106  |  12  ----------------------------<  112<H>  3.8   |  37<H>  |  <0.20<L>    Ca    7.9<L>      23 Apr 2021 06:21  Phos  3.4     04-23  Mg     2.2     04-23    TPro  6.2  /  Alb  2.1<L>  /  TBili  0.5  /  DBili  x   /  AST  32  /  ALT  20  /  AlkPhos  93  04-23    PT/INR - ( 22 Apr 2021 03:55 )   PT: 13.3 sec;   INR: 1.12 ratio         PTT - ( 22 Apr 2021 03:55 )  PTT:36.4 sec        RADIOLOGY & ADDITIONAL STUDIES REVIEWED:  yes    [ ]GOALS OF CARE DISCUSSION WITH PATIENT/FAMILY/PROXY:    CRITICAL CARE TIME SPENT: 35 minutes

## 2021-04-24 NOTE — PROGRESS NOTE ADULT - SUBJECTIVE AND OBJECTIVE BOX
Patient is a 55y old  Male who presents with a chief complaint of SOB (24 Apr 2021 01:55)    pt seen in icu [ x ], reg med floor [   ], bed [ x ], chair at bedside [   ], a+o x3 [  ], sedated [x  ],  nad [x  ]    andrea [ x ], ngt feed [x  ], et tube [ x ], cent line [x  ],        Allergies    No Known Allergies        Vitals    T(F): 98.6 (04-24-21 @ 00:45), Max: 99.9 (04-23-21 @ 20:00)  HR: 114 (04-24-21 @ 04:00) (77 - 124)  BP: 104/65 (04-23-21 @ 15:16) (104/65 - 104/65)  RR: 30 (04-24-21 @ 03:30) (19 - 35)  SpO2: 99% (04-24-21 @ 04:00) (87% - 100%)  Wt(kg): --  CAPILLARY BLOOD GLUCOSE      POCT Blood Glucose.: 112 mg/dL (23 Apr 2021 11:08)      Labs                          7.8    14.22 )-----------( 319      ( 24 Apr 2021 05:25 )             24.3       04-24    145  |  107  |  11  ----------------------------<  98  3.7   |  33<H>  |  <0.20<L>    Ca    8.1<L>      24 Apr 2021 05:25  Phos  2.4     04-24  Mg     1.9     04-24    TPro  6.1  /  Alb  2.1<L>  /  TBili  0.5  /  DBili  x   /  AST  21  /  ALT  19  /  AlkPhos  98  04-24            .Sputum Sputum  04-16 @ 04:42   Moderate Enterobacter aerogenes (Carbapenem Resistant)  Normal Respiratory Monserrat present  --  Enterobacter aerogenes (Carbapenem Resistant)      .Urine Clean Catch (Midstream)  03-15 @ 00:52   No growth  --  --          Radiology Results      Meds    MEDICATIONS  (STANDING):  acetaZOLAMIDE  IVPB 250 milliGRAM(s) IV Intermittent every 12 hours  ascorbic acid 1000 milliGRAM(s) Oral daily  aspirin  chewable 81 milliGRAM(s) Oral daily  chlorhexidine 0.12% Liquid 15 milliLiter(s) Oral Mucosa every 12 hours  chlorhexidine 2% Cloths 1 Application(s) Topical <User Schedule>  dexMEDEtomidine Infusion 0.2 MICROgram(s)/kG/Hr (3.9 mL/Hr) IV Continuous <Continuous>  enoxaparin Injectable 40 milliGRAM(s) SubCutaneous daily  fentaNYL   Infusion 3 MICROgram(s)/kG/Hr (25.2 mL/Hr) IV Continuous <Continuous>  insulin lispro (ADMELOG) corrective regimen sliding scale   SubCutaneous every 6 hours  midazolam Infusion 0.059 mG/kG/Hr (4.95 mL/Hr) IV Continuous <Continuous>  midodrine. 10 milliGRAM(s) Oral three times a day  pantoprazole  Injectable 40 milliGRAM(s) IV Push daily  polyethylene glycol 3350 17 Gram(s) Oral daily      MEDICATIONS  (PRN):  ALBUTerol    90 MICROgram(s) HFA Inhaler 2 Puff(s) Inhalation every 6 hours PRN Shortness of Breath and/or Wheezing  artificial  tears Solution 1 Drop(s) Both EYES every 4 hours PRN Dry Eyes  sodium chloride 0.9% lock flush 10 milliLiter(s) IV Push every 1 hour PRN Pre/post blood products, medications, blood draw, and to maintain line patency      Physical Exam    Neuro :  no focal deficits  Respiratory: CTA B/L  CV: RRR, S1S2, no murmurs,   Abdominal: Soft, NT, ND +BS,  Extremities: No edema, + peripheral pulses      ASSESSMENT    Hypoxemia 2nd to covid pna   transaminitis  prediabetes  h/o appendectomy  cholecystectomy        PLAN    contact and airborne isolation  d/c remdesevir given covid ab positive noted   completed dexamethasone   started pulse steroids for 3 days - 250mg solumedrol bid now tapered off 4/14/21  cont asa, vit c,    cont albuterol inhaler   pulm f/u  procalcitonin, D-dimer, crp, ldh, ferritin, lactate noted ,    tmx 100.8  cont tylenol prn,   cont robitussin prn   pt on fentanyl, phenylephrine, midazolam, propofol drip  s/p intubation 3/29/21   s/p tracheostomy 4/21/21  O2 sat (83% - 100%) mech vent 60%  O2 via mech vent and taper fio2 as tolerated   vent mgmt as per icu  xray 3/19/21 with pneumomediastinum  rept cxr with New trace right apical pneumothorax. New mild left apical pneumothorax. Grossly stable small pneumomediastinum.  Soft tissue emphysema at the neck bases bilaterally. Grossly stable bilateral pulmonary infiltrates noted.   cxr 2/24 with No evidence of pneumothorax can be appreciated on the available image. This may be related to patient positioning. Evidence of pneumomediastinum and subcutaneous emphysema in the lower neck is again noted. There are patchy bibasilar infiltrates and elevated right hemidiaphragm noted.   cxr 3/29/21 with No significant change bilateral infiltrates. There is a small simple left apical pneumothorax. No significant pleural effusion. Bilateral subcutaneous emphysema similar to prior.   XRs on 7AM and 5PM with no obvious increase of ptx  cxr 4/4/21 with Improving bilateral airspace disease noted    cxr 4/8/21 with Small left pneumothorax noted.  thoracic surg f/u   s/p L chest tube replacement 4/18/21 for recurrence of left pneumothorax   cxr 4/20/21 with Unchanged advanced infiltrates and catheter left chest tube noted   CXR 4/11/21 with : No interval change compared to one day prior. No pneumothorax noted.   Daily CXR  Monitor O2 status   s/p tracheostomy 4/21/21   stay sutures out 2 weeks from OR date  cxr 4/21/21 with No evidence of active chest disease. Tracheostomy tube in place otherwise no significant change noted above  surg eval for gastrostomy placement noted  thoracic team unable to place gastrostomy tube percutaneously.   plan for open gastrostomy tube today 4/23/2021.  id f/u   No need for further antibiotics as patient completed course of Meropenem  leukocytosis resolved  speutum cx with Enterobacter aerogenes (Carbapenem Resistant) noted above  andrea   lispro ss   prognosis poor  cont current meds  mgmt as per icu          Patient is a 55y old  Male who presents with a chief complaint of SOB (24 Apr 2021 01:55)    pt seen in icu [ x ], reg med floor [   ], bed [ x ], chair at bedside [   ], a+o x3 [  ], sedated [x  ],  nad [x  ]    andrea [ x ], g-tube [x  ], tracheostomy tube [ x ], cent line [x  ],        Allergies    No Known Allergies        Vitals    T(F): 98.6 (04-24-21 @ 00:45), Max: 99.9 (04-23-21 @ 20:00)  HR: 114 (04-24-21 @ 04:00) (77 - 124)  BP: 104/65 (04-23-21 @ 15:16) (104/65 - 104/65)  RR: 30 (04-24-21 @ 03:30) (19 - 35)  SpO2: 99% (04-24-21 @ 04:00) (87% - 100%)  Wt(kg): --  CAPILLARY BLOOD GLUCOSE      POCT Blood Glucose.: 112 mg/dL (23 Apr 2021 11:08)      Labs                          7.8    14.22 )-----------( 319      ( 24 Apr 2021 05:25 )             24.3       04-24    145  |  107  |  11  ----------------------------<  98  3.7   |  33<H>  |  <0.20<L>    Ca    8.1<L>      24 Apr 2021 05:25  Phos  2.4     04-24  Mg     1.9     04-24    TPro  6.1  /  Alb  2.1<L>  /  TBili  0.5  /  DBili  x   /  AST  21  /  ALT  19  /  AlkPhos  98  04-24            .Sputum Sputum  04-16 @ 04:42   Moderate Enterobacter aerogenes (Carbapenem Resistant)  Normal Respiratory Monserrat present  --  Enterobacter aerogenes (Carbapenem Resistant)      .Urine Clean Catch (Midstream)  03-15 @ 00:52   No growth  --  --          Radiology Results      Meds    MEDICATIONS  (STANDING):  acetaZOLAMIDE  IVPB 250 milliGRAM(s) IV Intermittent every 12 hours  ascorbic acid 1000 milliGRAM(s) Oral daily  aspirin  chewable 81 milliGRAM(s) Oral daily  chlorhexidine 0.12% Liquid 15 milliLiter(s) Oral Mucosa every 12 hours  chlorhexidine 2% Cloths 1 Application(s) Topical <User Schedule>  dexMEDEtomidine Infusion 0.2 MICROgram(s)/kG/Hr (3.9 mL/Hr) IV Continuous <Continuous>  enoxaparin Injectable 40 milliGRAM(s) SubCutaneous daily  fentaNYL   Infusion 3 MICROgram(s)/kG/Hr (25.2 mL/Hr) IV Continuous <Continuous>  insulin lispro (ADMELOG) corrective regimen sliding scale   SubCutaneous every 6 hours  midazolam Infusion 0.059 mG/kG/Hr (4.95 mL/Hr) IV Continuous <Continuous>  midodrine. 10 milliGRAM(s) Oral three times a day  pantoprazole  Injectable 40 milliGRAM(s) IV Push daily  polyethylene glycol 3350 17 Gram(s) Oral daily      MEDICATIONS  (PRN):  ALBUTerol    90 MICROgram(s) HFA Inhaler 2 Puff(s) Inhalation every 6 hours PRN Shortness of Breath and/or Wheezing  artificial  tears Solution 1 Drop(s) Both EYES every 4 hours PRN Dry Eyes  sodium chloride 0.9% lock flush 10 milliLiter(s) IV Push every 1 hour PRN Pre/post blood products, medications, blood draw, and to maintain line patency      Physical Exam    Neuro :  no focal deficits  Respiratory: CTA B/L  CV: RRR, S1S2, no murmurs,   Abdominal: Soft, NT, ND +BS, g- tube in place, dressing cdi  Extremities: b/l ue edema, + peripheral pulses      ASSESSMENT    Hypoxemia 2nd to covid pna   transaminitis  prediabetes  h/o appendectomy  cholecystectomy        PLAN    contact and airborne isolation  d/c remdesevir given covid ab positive noted   completed dexamethasone   started pulse steroids for 3 days - 250mg solumedrol bid now tapered off 4/14/21  cont asa, vit c,    cont albuterol inhaler   pulm f/u  procalcitonin, D-dimer, crp, ldh, ferritin, lactate noted ,    tmx 99.9  cont tylenol prn,   cont robitussin prn   pt on fentanyl, midazolam, precedex drip  s/p intubation 3/29/21   s/p tracheostomy 4/21/21  O2 sat (87% - 100%) mech vent 50%  O2 via mech vent and taper fio2 as tolerated   vent mgmt as per icu  xray 3/19/21 with pneumomediastinum  rept cxr with New trace right apical pneumothorax. New mild left apical pneumothorax. Grossly stable small pneumomediastinum.  Soft tissue emphysema at the neck bases bilaterally. Grossly stable bilateral pulmonary infiltrates noted.   cxr 2/24 with No evidence of pneumothorax can be appreciated on the available image. This may be related to patient positioning. Evidence of pneumomediastinum and subcutaneous emphysema in the lower neck is again noted. There are patchy bibasilar infiltrates and elevated right hemidiaphragm noted.   cxr 3/29/21 with No significant change bilateral infiltrates. There is a small simple left apical pneumothorax. No significant pleural effusion. Bilateral subcutaneous emphysema similar to prior.   XRs on 7AM and 5PM with no obvious increase of ptx  cxr 4/4/21 with Improving bilateral airspace disease noted    cxr 4/8/21 with Small left pneumothorax noted.  thoracic surg f/u   s/p L chest tube replacement 4/18/21 for recurrence of left pneumothorax   cxr 4/20/21 with Unchanged advanced infiltrates and catheter left chest tube noted   CXR 4/11/21 with : No interval change compared to one day prior. No pneumothorax noted.   Daily CXR  Monitor O2 status   s/p tracheostomy 4/21/21   stay sutures out 2 weeks from OR date  cxr 4/21/21 with No evidence of active chest disease. Tracheostomy tube in place otherwise no significant change noted   surg eval for gastrostomy placement noted  thoracic team unable to place gastrostomy tube percutaneously.   s/p gastrostomy tube 4/23/2021.  id f/u   No need for further antibiotics as patient completed course of Meropenem  leukocytosis resolved  speutum cx with Enterobacter aerogenes (Carbapenem Resistant) noted above  andrea   lispro ss   prognosis poor  cont current meds  mgmt as per icu

## 2021-04-24 NOTE — PROGRESS NOTE ADULT - SUBJECTIVE AND OBJECTIVE BOX
Pt on MV via trach. Still in ICU. Sedated. G-tube placed; to start feeds per surgery.     INTERVAL HPI/OVERNIGHT EVENTS:      VITAL SIGNS:  T(F): 98.9 (04-24-21 @ 09:30)  HR: 89 (04-24-21 @ 10:30)  BP: 104/65 (04-23-21 @ 15:16)  RR: 28 (04-24-21 @ 10:30)  SpO2: 100% (04-24-21 @ 10:30)  Wt(kg): --  I&O's Detail    23 Apr 2021 07:01  -  24 Apr 2021 07:00  --------------------------------------------------------  IN:    Dexmedetomidine: 21 mL    FentaNYL: 201.6 mL    FentaNYL: 411.4 mL    IV PiggyBack: 50 mL    Midazolam: 40 mL    Midazolam: 60 mL    Propofol: 100.8 mL    Propofol: 252 mL  Total IN: 1136.8 mL    OUT:    Chest Tube (mL): 100 mL    Incontinent per Condom Catheter (mL): 600 mL  Total OUT: 700 mL    Total NET: 436.8 mL        Mode: AC/ CMV (Assist Control/ Continuous Mandatory Ventilation)  RR (machine): 28  TV (machine): 430  FiO2: 50  PEEP: 3  ITime: 0.7  MAP: 12  PIP: 40        REVIEW OF SYSTEMS:    Unable to assess; sedated.     PHYSICAL EXAM:    Constitutional: Well developed and nourished  Eyes:Perrla  ENMT: normal  Neck:supple  Respiratory: MV - trach   Cardiovascular: S1 S2 regular  Gastrointestinal: g-tube  Extremities: No edema  Vascular:normal  Neurological: sedated.   Musculoskeletal: bed       MEDICATIONS  (STANDING):  acetaZOLAMIDE  IVPB 250 milliGRAM(s) IV Intermittent every 12 hours  ascorbic acid 1000 milliGRAM(s) Oral daily  aspirin  chewable 81 milliGRAM(s) Oral daily  chlorhexidine 0.12% Liquid 15 milliLiter(s) Oral Mucosa every 12 hours  chlorhexidine 2% Cloths 1 Application(s) Topical <User Schedule>  dexMEDEtomidine Infusion 0.2 MICROgram(s)/kG/Hr (3.9 mL/Hr) IV Continuous <Continuous>  enoxaparin Injectable 40 milliGRAM(s) SubCutaneous daily  fentaNYL   Infusion 3 MICROgram(s)/kG/Hr (25.2 mL/Hr) IV Continuous <Continuous>  insulin lispro (ADMELOG) corrective regimen sliding scale   SubCutaneous every 6 hours  midazolam Infusion 0.059 mG/kG/Hr (4.95 mL/Hr) IV Continuous <Continuous>  midodrine. 10 milliGRAM(s) Oral three times a day  pantoprazole  Injectable 40 milliGRAM(s) IV Push daily  polyethylene glycol 3350 17 Gram(s) Oral daily    MEDICATIONS  (PRN):  ALBUTerol    90 MICROgram(s) HFA Inhaler 2 Puff(s) Inhalation every 6 hours PRN Shortness of Breath and/or Wheezing  artificial  tears Solution 1 Drop(s) Both EYES every 4 hours PRN Dry Eyes  sodium chloride 0.9% lock flush 10 milliLiter(s) IV Push every 1 hour PRN Pre/post blood products, medications, blood draw, and to maintain line patency      Allergies    No Known Allergies    Intolerances        LABS:                        7.8    14.22 )-----------( 319      ( 24 Apr 2021 05:25 )             24.3     04-24    145  |  107  |  11  ----------------------------<  98  3.7   |  33<H>  |  <0.20<L>    Ca    8.1<L>      24 Apr 2021 05:25  Phos  2.4     04-24  Mg     1.9     04-24    TPro  6.1  /  Alb  2.1<L>  /  TBili  0.5  /  DBili  x   /  AST  21  /  ALT  19  /  AlkPhos  98  04-24        ABG - ( 24 Apr 2021 04:25 )  pH, Arterial: 7.39  pH, Blood: x     /  pCO2: 57    /  pO2: 71    / HCO3: 34    / Base Excess: 7.6   /  SaO2: 96                    CAPILLARY BLOOD GLUCOSE      POCT Blood Glucose.: 112 mg/dL (23 Apr 2021 11:08)    pro-bnp -- 04-23 @ 06:21     d-dimer 1097  04-23 @ 06:21  pro-bnp -- 04-22 @ 03:55     d-dimer 1367  04-22 @ 03:55  pro-bnp -- 04-21 @ 03:58     d-dimer 2160  04-21 @ 03:58  pro-bnp -- 04-20 @ 03:50     d-dimer 2911  04-20 @ 03:50  pro-bnp -- 04-19 @ 04:14     d-dimer 2394  04-19 @ 04:14  pro-bnp -- 04-18 @ 06:39     d-dimer 1778  04-18 @ 06:39      RADIOLOGY & ADDITIONAL TESTS:    CXR:  < from: Xray Chest 1 View- PORTABLE-Routine (Xray Chest 1 View- PORTABLE-Routine in AM.) (04.23.21 @ 08:55) >  IMPRESSION:   No interval change.    < end of copied text >    Ct scan chest:    ekg;    echo:

## 2021-04-24 NOTE — PROGRESS NOTE ADULT - ASSESSMENT
55 year old male s/p open gastrostomy tube POD#1    - may start feeds via G-tube  - continue care per ICU  - no  55 year old male s/p open gastrostomy tube POD#1    - may start feeds via G-tube  - continue care per ICU  - no further surgical intervention at this time

## 2021-04-24 NOTE — PROGRESS NOTE ADULT - ASSESSMENT
Assessment and plan: 54 y/o M with no PMH is admitted to ICU for AHRF 2/2 covid19 pna       1. Acute hypoxic respiratory failure  2. ARDS 2/2 Covid pneumonia  3. Transaminitis  4. Prediabetes  5. Abnormal TSH    =================== Neuro============================  Alert and oriented x 3 at baseline   intubated and sedated 3/29 on Vent  Trach 4/22,   Continue Versed to help with vent synchrony with Fentanyl  Added propofol for sedation   Plan is to titrate down Versed and start on Valium for benzo dependance,  Cut down on sedation as tolerated post procedure   CT head unremarkable     ================= Cardiovascular==========================  # Hypotension      Increase midodrine to 10 mg Q8  taper off phenylephrine as tolerated , Currently off pressers       # TACHYCARDIA: recurrent episodes   -Lopressor PRN pushes   -HR in 110's  -continue monitoring     ================- Pulm=================================  #Acute hypoxic respiratory failure: secondary to Covid pneumonia  #ARDS  -was on HFNr then got intubated 3/29  -VC Vent setting : 30/400/60/3  -D-dimer initially elevated on presentation, trending down    - s/p Nimbex and Epifanio at different occasions for vent synchrony  - s/p Pigtail on left chest , removed on 4/15  - Large Pneumothorax was noted on 4/18 on left side, s/p chest tube placement    -Remdesivir was discontinued due to positive antibodies   - Finished Dexamethasone   - prednisone taper, Finished    - Covid PCR negative now.,, D/C isolation       # Bacterial Pneumonia  - stable infiltrate on CXR ,likely developed VAP, Finished ABx Zosyn, meropenem, s/p 1 dose of Vanco  - MRSA negative from 3/26  - Sputum Cx growing few gram negative rods, CRE  - Pulm. dr. Ryan  - ID consult Dr Kika       #Pneumothorax and pneumomediastinum: resolved  -X-ray chest: tiny left apical pneumothorax  -Thoracic surgery following  -s/p Chest tube placed 4/8 pigtail to wall suction.  d/c'd 4/15  -X-ray monitoring daily, PTX resolved      ==================ID===================================  Likely bacterial pneumonia at present  -leukocytosis resolved  -No more fevers, On tylenol PRN only   - Finished Meropenem  - C-procal elevated  -plan as above     ================= Nephro================================  -pitting edema to both lower arms  -on condom cath   -monitor I/Os , good urine output overall  -s/p Albumin x 2 days on 4/10  -Lasix 40mg BID with Albumin 25% Q6 for 48 hours to help with urine output and fluid status.( 4/15) Albumin finished , was D/jennifer on 4/19, urine output decreasing  - Patient can get Lasix as needed   - Metabolic Alkalosis likely due to compensation     =================GI====================================  Transaminitis:   likely secondary to Covid19   - and  on presentation   hepatitis panel -ve  -Improved  -continue to monitor LFT    diet:   NGT (3/29) with tube feed  bowel regimen   Last BM 4/22  Plan for PEG       ================ Heme==================================  Elevated d-dimer: likely secondary to Covid  -d-dimer 423 on admission  -last D-dimer 2160, trending down   -continue prophylactic Lovenox 40 mg daily    Anemia  Hgb 8.8, no active signs of bleeding  will transfuse  if hgb drops.     =================Endocrine===============================  Prediabetes:    -A1c  5.8  -BS controlled  -continue HSS  -monitor FS while on steroids    Abnormal TSH:  -TSH level noted 0.26,   -repeat TSH 0.37 and Free T4 1.82    ================= Skin/Catheters============================  No rashes. Peripheral IV lines.   RIJ 4/13  RRA 3/29, Removed  RBA 4/20    - =================Prophylaxis =============================  Lovenox for DVT proph : Lovenox 40mg SQ daily  Protonix for  GI proph    ==================GOC==================================   FULL CODE Assessment and plan: 54 y/o M with no PMH is admitted to ICU for AHRF 2/2 covid19 pna       1. Acute hypoxic respiratory failure  2. ARDS 2/2 Covid pneumonia  3. Transaminitis  4. Prediabetes  5. Abnormal TSH    =================== Neuro============================  Alert and oriented x 3 at baseline   intubated and sedated 3/29 on Vent  Trach 4/22,   Continue Versed to help with vent synchrony with Fentanyl,   D/C propofol  Plan is to titrate down Versed and will start on Valium for benzo dependance,  Cut down on sedation as tolerated post procedure   Add fentanyl patch , propofol off, Precedex for agitation  CT head unremarkable     ================= Cardiovascular==========================  # Hypotension      midodrine 10 mg Q8  taper off phenylephrine as tolerated , Currently off pressers       # TACHYCARDIA: recurrent episodes   -Lopressor PRN pushes   -HR in 110's  -continue monitoring     ================- Pulm=================================  #Acute hypoxic respiratory failure: secondary to Covid pneumonia  #ARDS  -was on HFNr then got intubated 3/29  -VC Vent setting : 30/400/60/3  -D-dimer initially elevated on presentation, trending down    - s/p Nimbex and Epifanio at different occasions for vent synchrony  - s/p Pigtail on left chest , removed on 4/15  - Large Pneumothorax was noted on 4/18 on left side, s/p chest tube placement  - f/u Thoracic for F/u     -Remdesivir was discontinued due to positive antibodies   - Finished Dexamethasone   - prednisone taper, Finished    - Covid PCR negative now.,, D/C isolation       # Bacterial Pneumonia  - stable infiltrate on CXR ,likely developed VAP, Finished ABx Zosyn, meropenem, s/p 1 dose of Vanco  - MRSA negative from 3/26  - Sputum Cx growing few gram negative rods, CRE  - Pulm. dr. Ryan  - ID consult Dr Anand       #Pneumothorax and pneumomediastinum: resolved  -X-ray chest: tiny left apical pneumothorax  -Thoracic surgery following  -s/p Chest tube placed 4/8 pigtail to wall suction.  d/c'd 4/15  -X-ray monitoring daily, PTX resolved      ==================ID===================================  Likely bacterial pneumonia   -leukocytosis resolved  -No more fevers, On tylenol PRN only   - Finished Meropenem  - C-procal elevated  -plan as above     ================= Nephro================================  -pitting edema to both lower arms  -on condom cath   -monitor I/Os , good urine output overall  -s/p Albumin x 2 days on 4/10  -Lasix 40mg BID with Albumin 25% Q6 for 48 hours to help with urine output and fluid status.( 4/15) Albumin finished , was D/jennifer on 4/19, urine output decreasing  - Patient can get Lasix as needed   - Metabolic Alkalosis likely due to compensation , improving post Diamox     =================GI====================================  Transaminitis:   likely secondary to Covid19   - and  on presentation   hepatitis panel -ve  -Improved  -continue to monitor LFT    diet:   NGT (3/29) with tube feed  bowel regimen   Last BM 4/22  Peg placed ,will start on PO meds after 24 hours       ================ Heme==================================  Elevated d-dimer: likely secondary to Covid  -d-dimer 423 on admission  -last D-dimer 2160, trending down   -continue prophylactic Lovenox 40 mg daily    Anemia  Hgb 8.8, no active signs of bleeding  will transfuse  if hgb drops.     =================Endocrine===============================  Prediabetes:    -A1c  5.8  -BS controlled  -continue HSS  -monitor FS while on steroids    Abnormal TSH:  -TSH level noted 0.26,   -repeat TSH 0.37 and Free T4 1.82    ================= Skin/Catheters============================  No rashes. Peripheral IV lines.   RIJ 4/13  RRA 3/29, Removed  RBA 4/20    - =================Prophylaxis =============================  Lovenox for DVT proph : Lovenox 40mg SQ daily  Protonix for  GI proph    ==================GOC==================================   FULL CODE

## 2021-04-24 NOTE — PROGRESS NOTE ADULT - SUBJECTIVE AND OBJECTIVE BOX
POST-OPERATIVE NOTE    Subjective:   55y Male s/p G-tube placement POD #0.Sedated and Intubated.       Vital Signs Last 24 Hrs  T(C): 37 (24 Apr 2021 00:45), Max: 37.7 (23 Apr 2021 20:00)  T(F): 98.6 (24 Apr 2021 00:45), Max: 99.9 (23 Apr 2021 20:00)  HR: 114 (24 Apr 2021 04:00) (77 - 124)  BP: 104/65 (23 Apr 2021 15:16) (104/65 - 104/65)  BP(mean): --  RR: 30 (24 Apr 2021 03:30) (12 - 35)  SpO2: 99% (24 Apr 2021 04:00) (87% - 100%)  I&O's Detail    22 Apr 2021 07:01  -  23 Apr 2021 07:00  --------------------------------------------------------  IN:    Enteral Tube Flush: 270 mL    FentaNYL: 502.5 mL    Glucerna 1.5: 400 mL    IV PiggyBack: 50 mL    IV PiggyBack: 583.1 mL    IV PiggyBack: 200 mL    Midazolam: 130 mL    Propofol: 496.4 mL  Total IN: 2632 mL    OUT:    Chest Tube (mL): 300 mL    Incontinent per Condom Catheter (mL): 1850 mL    Phenylephrine: 0 mL  Total OUT: 2150 mL    Total NET: 482 mL      23 Apr 2021 07:01  -  24 Apr 2021 05:56  --------------------------------------------------------  IN:    FentaNYL: 201.6 mL    FentaNYL: 277 mL    IV PiggyBack: 50 mL    Midazolam: 40 mL    Midazolam: 40 mL    Propofol: 201.6 mL    Propofol: 100.8 mL  Total IN: 911 mL    OUT:    Chest Tube (mL): 50 mL    Incontinent per Condom Catheter (mL): 600 mL  Total OUT: 650 mL    Total NET: 261 mL          Physical Exam:  General: NAD, resting comfortably in bed  Pulmonary: Nonlabored breathing, no respiratory distress  Cardiovascular: NSR, S1, S2  Abdominal: soft, NT/ND,   Extremities: no edema, no calf tenderness, distal pulses are palpable     LABS:                        7.8    14.22 )-----------( 319      ( 24 Apr 2021 05:25 )             24.3     04-24    145  |  107  |  11  ----------------------------<  98  3.7   |  33<H>  |  x     Ca    8.1<L>      24 Apr 2021 05:25  Phos  3.4     04-23  Mg     1.9     04-24    TPro  x   /  Alb  2.1<L>  /  TBili  x   /  DBili  x   /  AST  x   /  ALT  x   /  AlkPhos  x   04-24    LIVER FUNCTIONS - ( 24 Apr 2021 05:25 )  Alb: 2.1 g/dL / Pro: x     / ALK PHOS: x     / ALT: x     / AST: x     / GGT: x             MEDICATIONS  (STANDING):  acetaZOLAMIDE  IVPB 250 milliGRAM(s) IV Intermittent every 12 hours  ascorbic acid 1000 milliGRAM(s) Oral daily  aspirin  chewable 81 milliGRAM(s) Oral daily  chlorhexidine 0.12% Liquid 15 milliLiter(s) Oral Mucosa every 12 hours  chlorhexidine 2% Cloths 1 Application(s) Topical <User Schedule>  dexMEDEtomidine Infusion 0.2 MICROgram(s)/kG/Hr (3.9 mL/Hr) IV Continuous <Continuous>  enoxaparin Injectable 40 milliGRAM(s) SubCutaneous daily  fentaNYL   Infusion 3 MICROgram(s)/kG/Hr (25.2 mL/Hr) IV Continuous <Continuous>  insulin lispro (ADMELOG) corrective regimen sliding scale   SubCutaneous every 6 hours  midazolam Infusion 0.059 mG/kG/Hr (4.95 mL/Hr) IV Continuous <Continuous>  midodrine. 10 milliGRAM(s) Oral three times a day  pantoprazole  Injectable 40 milliGRAM(s) IV Push daily  polyethylene glycol 3350 17 Gram(s) Oral daily    MEDICATIONS  (PRN):  ALBUTerol    90 MICROgram(s) HFA Inhaler 2 Puff(s) Inhalation every 6 hours PRN Shortness of Breath and/or Wheezing  artificial  tears Solution 1 Drop(s) Both EYES every 4 hours PRN Dry Eyes  sodium chloride 0.9% lock flush 10 milliLiter(s) IV Push every 1 hour PRN Pre/post blood products, medications, blood draw, and to maintain line patency      Assessment:  The patient is a 55y Male who is s/p G-tube placement POD #0. Guarded prognosis    Plan:  - Pain control as needed  -Dressing change prn   - F/u AM labs  - DVT ppx POST-OPERATIVE NOTE    Subjective:   55y Male s/p G-tube placement POD #0.Sedated and Intubated.       Vital Signs Last 24 Hrs  T(C): 37 (24 Apr 2021 00:45), Max: 37.7 (23 Apr 2021 20:00)  T(F): 98.6 (24 Apr 2021 00:45), Max: 99.9 (23 Apr 2021 20:00)  HR: 114 (24 Apr 2021 04:00) (77 - 124)  BP: 104/65 (23 Apr 2021 15:16) (104/65 - 104/65)  BP(mean): --  RR: 30 (24 Apr 2021 03:30) (12 - 35)  SpO2: 99% (24 Apr 2021 04:00) (87% - 100%)  I&O's Detail    22 Apr 2021 07:01  -  23 Apr 2021 07:00  --------------------------------------------------------  IN:    Enteral Tube Flush: 270 mL    FentaNYL: 502.5 mL    Glucerna 1.5: 400 mL    IV PiggyBack: 50 mL    IV PiggyBack: 583.1 mL    IV PiggyBack: 200 mL    Midazolam: 130 mL    Propofol: 496.4 mL  Total IN: 2632 mL    OUT:    Chest Tube (mL): 300 mL    Incontinent per Condom Catheter (mL): 1850 mL    Phenylephrine: 0 mL  Total OUT: 2150 mL    Total NET: 482 mL      23 Apr 2021 07:01  -  24 Apr 2021 05:56  --------------------------------------------------------  IN:    FentaNYL: 201.6 mL    FentaNYL: 277 mL    IV PiggyBack: 50 mL    Midazolam: 40 mL    Midazolam: 40 mL    Propofol: 201.6 mL    Propofol: 100.8 mL  Total IN: 911 mL    OUT:    Chest Tube (mL): 50 mL    Incontinent per Condom Catheter (mL): 600 mL  Total OUT: 650 mL    Total NET: 261 mL          Physical Exam:  General: NAD, sedated and intubated  Pulmonary: Nonlabored breathing, no respiratory distress  Cardiovascular: NSR, S1, S2  Abdominal: soft, NT/ND,   Extremities: no edema, no calf tenderness, distal pulses are palpable     LABS:                        7.8    14.22 )-----------( 319      ( 24 Apr 2021 05:25 )             24.3     04-24    145  |  107  |  11  ----------------------------<  98  3.7   |  33<H>  |  x     Ca    8.1<L>      24 Apr 2021 05:25  Phos  3.4     04-23  Mg     1.9     04-24    TPro  x   /  Alb  2.1<L>  /  TBili  x   /  DBili  x   /  AST  x   /  ALT  x   /  AlkPhos  x   04-24    LIVER FUNCTIONS - ( 24 Apr 2021 05:25 )  Alb: 2.1 g/dL / Pro: x     / ALK PHOS: x     / ALT: x     / AST: x     / GGT: x             MEDICATIONS  (STANDING):  acetaZOLAMIDE  IVPB 250 milliGRAM(s) IV Intermittent every 12 hours  ascorbic acid 1000 milliGRAM(s) Oral daily  aspirin  chewable 81 milliGRAM(s) Oral daily  chlorhexidine 0.12% Liquid 15 milliLiter(s) Oral Mucosa every 12 hours  chlorhexidine 2% Cloths 1 Application(s) Topical <User Schedule>  dexMEDEtomidine Infusion 0.2 MICROgram(s)/kG/Hr (3.9 mL/Hr) IV Continuous <Continuous>  enoxaparin Injectable 40 milliGRAM(s) SubCutaneous daily  fentaNYL   Infusion 3 MICROgram(s)/kG/Hr (25.2 mL/Hr) IV Continuous <Continuous>  insulin lispro (ADMELOG) corrective regimen sliding scale   SubCutaneous every 6 hours  midazolam Infusion 0.059 mG/kG/Hr (4.95 mL/Hr) IV Continuous <Continuous>  midodrine. 10 milliGRAM(s) Oral three times a day  pantoprazole  Injectable 40 milliGRAM(s) IV Push daily  polyethylene glycol 3350 17 Gram(s) Oral daily    MEDICATIONS  (PRN):  ALBUTerol    90 MICROgram(s) HFA Inhaler 2 Puff(s) Inhalation every 6 hours PRN Shortness of Breath and/or Wheezing  artificial  tears Solution 1 Drop(s) Both EYES every 4 hours PRN Dry Eyes  sodium chloride 0.9% lock flush 10 milliLiter(s) IV Push every 1 hour PRN Pre/post blood products, medications, blood draw, and to maintain line patency      Assessment:  55y Male who is s/p G-tube placement POD #0. Stable no bleeding    Plan:  - Pain control as needed  -Dressing change prn   - F/u AM labs  - DVT ppx

## 2021-04-25 LAB
ALBUMIN SERPL ELPH-MCNC: 1.8 G/DL — LOW (ref 3.5–5)
ALP SERPL-CCNC: 92 U/L — SIGNIFICANT CHANGE UP (ref 40–120)
ALT FLD-CCNC: 16 U/L DA — SIGNIFICANT CHANGE UP (ref 10–60)
ANION GAP SERPL CALC-SCNC: 6 MMOL/L — SIGNIFICANT CHANGE UP (ref 5–17)
AST SERPL-CCNC: 19 U/L — SIGNIFICANT CHANGE UP (ref 10–40)
BASE EXCESS BLDA CALC-SCNC: 7 MMOL/L — HIGH (ref -2–3)
BASOPHILS # BLD AUTO: 0.04 K/UL — SIGNIFICANT CHANGE UP (ref 0–0.2)
BASOPHILS NFR BLD AUTO: 0.3 % — SIGNIFICANT CHANGE UP (ref 0–2)
BILIRUB SERPL-MCNC: 0.6 MG/DL — SIGNIFICANT CHANGE UP (ref 0.2–1.2)
BLOOD GAS COMMENTS ARTERIAL: SIGNIFICANT CHANGE UP
BUN SERPL-MCNC: 12 MG/DL — SIGNIFICANT CHANGE UP (ref 7–18)
CALCIUM SERPL-MCNC: 8.3 MG/DL — LOW (ref 8.4–10.5)
CHLORIDE SERPL-SCNC: 109 MMOL/L — HIGH (ref 96–108)
CHOLEST SERPL-MCNC: 165 MG/DL — SIGNIFICANT CHANGE UP
CO2 SERPL-SCNC: 32 MMOL/L — HIGH (ref 22–31)
CREAT SERPL-MCNC: <0.2 MG/DL — LOW (ref 0.5–1.3)
EOSINOPHIL # BLD AUTO: 0.17 K/UL — SIGNIFICANT CHANGE UP (ref 0–0.5)
EOSINOPHIL NFR BLD AUTO: 1.4 % — SIGNIFICANT CHANGE UP (ref 0–6)
GLUCOSE BLDC GLUCOMTR-MCNC: 116 MG/DL — HIGH (ref 70–99)
GLUCOSE BLDC GLUCOMTR-MCNC: 135 MG/DL — HIGH (ref 70–99)
GLUCOSE BLDC GLUCOMTR-MCNC: 158 MG/DL — HIGH (ref 70–99)
GLUCOSE BLDC GLUCOMTR-MCNC: 97 MG/DL — SIGNIFICANT CHANGE UP (ref 70–99)
GLUCOSE SERPL-MCNC: 92 MG/DL — SIGNIFICANT CHANGE UP (ref 70–99)
HCO3 BLDA-SCNC: 35 MMOL/L — HIGH (ref 21–28)
HCT VFR BLD CALC: 23.8 % — LOW (ref 39–50)
HDLC SERPL-MCNC: 26 MG/DL — LOW
HGB BLD-MCNC: 7.5 G/DL — LOW (ref 13–17)
HOROWITZ INDEX BLDA+IHG-RTO: 50 — SIGNIFICANT CHANGE UP
IMM GRANULOCYTES NFR BLD AUTO: 4.8 % — HIGH (ref 0–1.5)
LIPID PNL WITH DIRECT LDL SERPL: 56 MG/DL — SIGNIFICANT CHANGE UP
LYMPHOCYTES # BLD AUTO: 2.99 K/UL — SIGNIFICANT CHANGE UP (ref 1–3.3)
LYMPHOCYTES # BLD AUTO: 25.3 % — SIGNIFICANT CHANGE UP (ref 13–44)
MAGNESIUM SERPL-MCNC: 2.3 MG/DL — SIGNIFICANT CHANGE UP (ref 1.6–2.6)
MCHC RBC-ENTMCNC: 31.5 GM/DL — LOW (ref 32–36)
MCHC RBC-ENTMCNC: 34.1 PG — HIGH (ref 27–34)
MCV RBC AUTO: 108.2 FL — HIGH (ref 80–100)
MONOCYTES # BLD AUTO: 0.75 K/UL — SIGNIFICANT CHANGE UP (ref 0–0.9)
MONOCYTES NFR BLD AUTO: 6.4 % — SIGNIFICANT CHANGE UP (ref 2–14)
NEUTROPHILS # BLD AUTO: 7.28 K/UL — SIGNIFICANT CHANGE UP (ref 1.8–7.4)
NEUTROPHILS NFR BLD AUTO: 61.8 % — SIGNIFICANT CHANGE UP (ref 43–77)
NON HDL CHOLESTEROL: 139 MG/DL — HIGH
NRBC # BLD: 0 /100 WBCS — SIGNIFICANT CHANGE UP (ref 0–0)
PCO2 BLDA: 65 MMHG — HIGH (ref 35–48)
PH BLDA: 7.34 — LOW (ref 7.35–7.45)
PHOSPHATE SERPL-MCNC: 2.9 MG/DL — SIGNIFICANT CHANGE UP (ref 2.5–4.5)
PLATELET # BLD AUTO: 284 K/UL — SIGNIFICANT CHANGE UP (ref 150–400)
PO2 BLDA: 60 MMHG — LOW (ref 83–108)
POTASSIUM SERPL-MCNC: 3.5 MMOL/L — SIGNIFICANT CHANGE UP (ref 3.5–5.3)
POTASSIUM SERPL-SCNC: 3.5 MMOL/L — SIGNIFICANT CHANGE UP (ref 3.5–5.3)
PROT SERPL-MCNC: 5.9 G/DL — LOW (ref 6–8.3)
RBC # BLD: 2.2 M/UL — LOW (ref 4.2–5.8)
RBC # FLD: 17.6 % — HIGH (ref 10.3–14.5)
SAO2 % BLDA: 92 % — SIGNIFICANT CHANGE UP
SODIUM SERPL-SCNC: 147 MMOL/L — HIGH (ref 135–145)
TRIGL SERPL-MCNC: 414 MG/DL — HIGH
WBC # BLD: 11.8 K/UL — HIGH (ref 3.8–10.5)
WBC # FLD AUTO: 11.8 K/UL — HIGH (ref 3.8–10.5)

## 2021-04-25 PROCEDURE — 71045 X-RAY EXAM CHEST 1 VIEW: CPT | Mod: 26

## 2021-04-25 RX ORDER — POTASSIUM CHLORIDE 20 MEQ
20 PACKET (EA) ORAL ONCE
Refills: 0 | Status: COMPLETED | OUTPATIENT
Start: 2021-04-25 | End: 2021-04-25

## 2021-04-25 RX ADMIN — FENTANYL CITRATE 25.2 MICROGRAM(S)/KG/HR: 50 INJECTION INTRAVENOUS at 07:30

## 2021-04-25 RX ADMIN — Medication 5 MILLIGRAM(S): at 12:16

## 2021-04-25 RX ADMIN — MIDODRINE HYDROCHLORIDE 10 MILLIGRAM(S): 2.5 TABLET ORAL at 17:18

## 2021-04-25 RX ADMIN — FENTANYL CITRATE 1 PATCH: 50 INJECTION INTRAVENOUS at 07:37

## 2021-04-25 RX ADMIN — PANTOPRAZOLE SODIUM 40 MILLIGRAM(S): 20 TABLET, DELAYED RELEASE ORAL at 12:16

## 2021-04-25 RX ADMIN — ENOXAPARIN SODIUM 40 MILLIGRAM(S): 100 INJECTION SUBCUTANEOUS at 12:16

## 2021-04-25 RX ADMIN — Medication 5 MILLIGRAM(S): at 23:53

## 2021-04-25 RX ADMIN — Medication 1000 MILLIGRAM(S): at 12:16

## 2021-04-25 RX ADMIN — FENTANYL CITRATE 25.2 MICROGRAM(S)/KG/HR: 50 INJECTION INTRAVENOUS at 17:17

## 2021-04-25 RX ADMIN — Medication 1: at 12:17

## 2021-04-25 RX ADMIN — CHLORHEXIDINE GLUCONATE 15 MILLILITER(S): 213 SOLUTION TOPICAL at 17:18

## 2021-04-25 RX ADMIN — ACETAZOLAMIDE 105 MILLIGRAM(S): 250 TABLET ORAL at 17:17

## 2021-04-25 RX ADMIN — Medication 81 MILLIGRAM(S): at 12:16

## 2021-04-25 RX ADMIN — ACETAZOLAMIDE 105 MILLIGRAM(S): 250 TABLET ORAL at 05:56

## 2021-04-25 RX ADMIN — Medication 20 MILLIEQUIVALENT(S): at 08:29

## 2021-04-25 RX ADMIN — Medication 5 MILLIGRAM(S): at 00:18

## 2021-04-25 RX ADMIN — Medication 1: at 23:50

## 2021-04-25 RX ADMIN — DEXMEDETOMIDINE HYDROCHLORIDE IN 0.9% SODIUM CHLORIDE 3.9 MICROGRAM(S)/KG/HR: 4 INJECTION INTRAVENOUS at 05:56

## 2021-04-25 RX ADMIN — Medication 5 MILLIGRAM(S): at 17:17

## 2021-04-25 RX ADMIN — FENTANYL CITRATE 1 PATCH: 50 INJECTION INTRAVENOUS at 19:18

## 2021-04-25 RX ADMIN — MIDODRINE HYDROCHLORIDE 10 MILLIGRAM(S): 2.5 TABLET ORAL at 12:16

## 2021-04-25 RX ADMIN — CHLORHEXIDINE GLUCONATE 1 APPLICATION(S): 213 SOLUTION TOPICAL at 05:56

## 2021-04-25 RX ADMIN — POLYETHYLENE GLYCOL 3350 17 GRAM(S): 17 POWDER, FOR SOLUTION ORAL at 12:19

## 2021-04-25 RX ADMIN — DEXMEDETOMIDINE HYDROCHLORIDE IN 0.9% SODIUM CHLORIDE 3.9 MICROGRAM(S)/KG/HR: 4 INJECTION INTRAVENOUS at 14:13

## 2021-04-25 RX ADMIN — MIDODRINE HYDROCHLORIDE 10 MILLIGRAM(S): 2.5 TABLET ORAL at 05:56

## 2021-04-25 RX ADMIN — Medication 5 MILLIGRAM(S): at 05:56

## 2021-04-25 RX ADMIN — CHLORHEXIDINE GLUCONATE 15 MILLILITER(S): 213 SOLUTION TOPICAL at 05:59

## 2021-04-25 NOTE — PROGRESS NOTE ADULT - ASSESSMENT
Assessment and Recommendation:   Problem/Recommendation - 1:  Problem: COVID-19. Recommendation: isolation precautions  Cont mechanical ventilation - S.P trach.   Wean as tolerated  Pulmonary toilet  Supplemental nutrition  ICU management.   Monitor oxygen sat  Monitor LFT, LDH, CRP, D-Dimer, Ferritin and procalcitonin  Vit C, D and zinc supp  Montelukast 10 mgs po Qhs  Daily CXR   G-tube with feeds  DVT and GI PPX     Problem/Recommendation - 2:  ·  Problem: UTI (urinary tract infection).  Recommendation: F.U cultures   Off Antibiotics.     Problem/Recommendation - 3:  ·  Problem: Chest pain.  Recommendation: likely related to Covid-19 infection.     Problem/Recommendation - 4:  ·  Problem: Transaminitis.  Recommendation: monitor LFTs  GI F/U     Problem/Recommendation - 5:  ·  Problem: Pneumothorax.  Recommendation: Surgicel removed.   Thoracic sx follow up  Daily  CXR   Management per ICU     Problem/Recommendation - 6:  ·  Problem: Anemia.  Recommendation: PRBC PRN   Monitor labs  Monitor for bleeding

## 2021-04-25 NOTE — PROGRESS NOTE ADULT - SUBJECTIVE AND OBJECTIVE BOX
Patient is a 55y old  Male who presents with a chief complaint of SOB (25 Apr 2021 02:24)    pt seen in icu [ x ], reg med floor [   ], bed [ x ], chair at bedside [   ], a+o x3 [  ], sedated [x  ],  nad [x  ]    andrea [ x ], g-tube [x  ], tracheostomy tube [ x ], cent line [x  ],        Allergies    No Known Allergies        Vitals    T(F): 97.7 (04-24-21 @ 16:31), Max: 98.9 (04-24-21 @ 09:30)  HR: 104 (04-25-21 @ 04:04) (84 - 109)  BP: --  RR: 28 (04-25-21 @ 03:30) (22 - 38)  SpO2: 91% (04-25-21 @ 04:04) (87% - 100%)  Wt(kg): --  CAPILLARY BLOOD GLUCOSE      POCT Blood Glucose.: 97 mg/dL (25 Apr 2021 06:32)      Labs                          7.5    11.80 )-----------( 284      ( 25 Apr 2021 06:14 )             23.8       04-24    145  |  107  |  11  ----------------------------<  98  3.7   |  33<H>  |  <0.20<L>    Ca    8.1<L>      24 Apr 2021 05:25  Phos  2.4     04-24  Mg     1.9     04-24    TPro  6.1  /  Alb  2.1<L>  /  TBili  0.5  /  DBili  x   /  AST  21  /  ALT  19  /  AlkPhos  98  04-24            .Sputum Sputum  04-16 @ 04:42   Moderate Enterobacter aerogenes (Carbapenem Resistant)  Normal Respiratory Monserrat present  --  Enterobacter aerogenes (Carbapenem Resistant)      .Urine Clean Catch (Midstream)  03-15 @ 00:52   No growth  --  --          Radiology Results      Meds    MEDICATIONS  (STANDING):  acetaZOLAMIDE  IVPB 250 milliGRAM(s) IV Intermittent every 12 hours  ascorbic acid 1000 milliGRAM(s) Oral daily  aspirin  chewable 81 milliGRAM(s) Oral daily  chlorhexidine 0.12% Liquid 15 milliLiter(s) Oral Mucosa every 12 hours  chlorhexidine 2% Cloths 1 Application(s) Topical <User Schedule>  dexMEDEtomidine Infusion 0.2 MICROgram(s)/kG/Hr (3.9 mL/Hr) IV Continuous <Continuous>  diazepam    Tablet 5 milliGRAM(s) Oral every 6 hours  enoxaparin Injectable 40 milliGRAM(s) SubCutaneous daily  fentaNYL   Infusion 3 MICROgram(s)/kG/Hr (25.2 mL/Hr) IV Continuous <Continuous>  fentaNYL   Patch  25 MICROgram(s)/Hr. 1 Patch Transdermal every 48 hours  insulin lispro (ADMELOG) corrective regimen sliding scale   SubCutaneous every 6 hours  midazolam Infusion 0.059 mG/kG/Hr (4.95 mL/Hr) IV Continuous <Continuous>  midodrine. 10 milliGRAM(s) Oral three times a day  pantoprazole  Injectable 40 milliGRAM(s) IV Push daily  polyethylene glycol 3350 17 Gram(s) Oral daily      MEDICATIONS  (PRN):  ALBUTerol    90 MICROgram(s) HFA Inhaler 2 Puff(s) Inhalation every 6 hours PRN Shortness of Breath and/or Wheezing  artificial  tears Solution 1 Drop(s) Both EYES every 4 hours PRN Dry Eyes  sodium chloride 0.9% lock flush 10 milliLiter(s) IV Push every 1 hour PRN Pre/post blood products, medications, blood draw, and to maintain line patency      Physical Exam    Neuro :  no focal deficits  Respiratory: CTA B/L  CV: RRR, S1S2, no murmurs,   Abdominal: Soft, NT, ND +BS, g- tube in place, dressing cdi  Extremities: b/l ue edema, + peripheral pulses      ASSESSMENT    Hypoxemia 2nd to covid pna   transaminitis  prediabetes  h/o appendectomy  cholecystectomy        PLAN    contact and airborne isolation  d/c remdesevir given covid ab positive noted   completed dexamethasone   started pulse steroids for 3 days - 250mg solumedrol bid now tapered off 4/14/21  cont asa, vit c,    cont albuterol inhaler   pulm f/u  procalcitonin, D-dimer, crp, ldh, ferritin, lactate noted ,    tmx 99.9  cont tylenol prn,   cont robitussin prn   pt on fentanyl, midazolam, precedex drip  s/p intubation 3/29/21   s/p tracheostomy 4/21/21  O2 sat (87% - 100%) mech vent 50%  O2 via mech vent and taper fio2 as tolerated   vent mgmt as per icu  xray 3/19/21 with pneumomediastinum  rept cxr with New trace right apical pneumothorax. New mild left apical pneumothorax. Grossly stable small pneumomediastinum.  Soft tissue emphysema at the neck bases bilaterally. Grossly stable bilateral pulmonary infiltrates noted.   cxr 2/24 with No evidence of pneumothorax can be appreciated on the available image. This may be related to patient positioning. Evidence of pneumomediastinum and subcutaneous emphysema in the lower neck is again noted. There are patchy bibasilar infiltrates and elevated right hemidiaphragm noted.   cxr 3/29/21 with No significant change bilateral infiltrates. There is a small simple left apical pneumothorax. No significant pleural effusion. Bilateral subcutaneous emphysema similar to prior.   XRs on 7AM and 5PM with no obvious increase of ptx  cxr 4/4/21 with Improving bilateral airspace disease noted    cxr 4/8/21 with Small left pneumothorax noted.  thoracic surg f/u   s/p L chest tube replacement 4/18/21 for recurrence of left pneumothorax   cxr 4/20/21 with Unchanged advanced infiltrates and catheter left chest tube noted   CXR 4/11/21 with : No interval change compared to one day prior. No pneumothorax noted.   Daily CXR  Monitor O2 status   s/p tracheostomy 4/21/21   stay sutures out 2 weeks from OR date  cxr 4/21/21 with No evidence of active chest disease. Tracheostomy tube in place otherwise no significant change noted   surg eval for gastrostomy placement noted  thoracic team unable to place gastrostomy tube percutaneously.   s/p gastrostomy tube 4/23/2021.  id f/u   No need for further antibiotics as patient completed course of Meropenem  leukocytosis resolved  speutum cx with Enterobacter aerogenes (Carbapenem Resistant) noted above  andrea   lispro ss   prognosis poor  cont current meds  mgmt as per icu          Patient is a 55y old  Male who presents with a chief complaint of SOB (25 Apr 2021 02:24)    pt seen in icu [ x ], reg med floor [   ], bed [ x ], chair at bedside [   ], a+o x3 [  ], sedated [x  ],  nad [x  ]    andrea [ x ], g-tube feeding [x  ], tracheostomy tube [ x ], cent line [x  ],        Allergies    No Known Allergies        Vitals    T(F): 97.7 (04-24-21 @ 16:31), Max: 98.9 (04-24-21 @ 09:30)  HR: 104 (04-25-21 @ 04:04) (84 - 109)  BP: --  RR: 28 (04-25-21 @ 03:30) (22 - 38)  SpO2: 91% (04-25-21 @ 04:04) (87% - 100%)  Wt(kg): --  CAPILLARY BLOOD GLUCOSE      POCT Blood Glucose.: 97 mg/dL (25 Apr 2021 06:32)      Labs                          7.5    11.80 )-----------( 284      ( 25 Apr 2021 06:14 )             23.8       04-24    145  |  107  |  11  ----------------------------<  98  3.7   |  33<H>  |  <0.20<L>    Ca    8.1<L>      24 Apr 2021 05:25  Phos  2.4     04-24  Mg     1.9     04-24    TPro  6.1  /  Alb  2.1<L>  /  TBili  0.5  /  DBili  x   /  AST  21  /  ALT  19  /  AlkPhos  98  04-24            .Sputum Sputum  04-16 @ 04:42   Moderate Enterobacter aerogenes (Carbapenem Resistant)  Normal Respiratory Monserrat present  --  Enterobacter aerogenes (Carbapenem Resistant)      .Urine Clean Catch (Midstream)  03-15 @ 00:52   No growth  --  --          Radiology Results      Meds    MEDICATIONS  (STANDING):  acetaZOLAMIDE  IVPB 250 milliGRAM(s) IV Intermittent every 12 hours  ascorbic acid 1000 milliGRAM(s) Oral daily  aspirin  chewable 81 milliGRAM(s) Oral daily  chlorhexidine 0.12% Liquid 15 milliLiter(s) Oral Mucosa every 12 hours  chlorhexidine 2% Cloths 1 Application(s) Topical <User Schedule>  dexMEDEtomidine Infusion 0.2 MICROgram(s)/kG/Hr (3.9 mL/Hr) IV Continuous <Continuous>  diazepam    Tablet 5 milliGRAM(s) Oral every 6 hours  enoxaparin Injectable 40 milliGRAM(s) SubCutaneous daily  fentaNYL   Infusion 3 MICROgram(s)/kG/Hr (25.2 mL/Hr) IV Continuous <Continuous>  fentaNYL   Patch  25 MICROgram(s)/Hr. 1 Patch Transdermal every 48 hours  insulin lispro (ADMELOG) corrective regimen sliding scale   SubCutaneous every 6 hours  midazolam Infusion 0.059 mG/kG/Hr (4.95 mL/Hr) IV Continuous <Continuous>  midodrine. 10 milliGRAM(s) Oral three times a day  pantoprazole  Injectable 40 milliGRAM(s) IV Push daily  polyethylene glycol 3350 17 Gram(s) Oral daily      MEDICATIONS  (PRN):  ALBUTerol    90 MICROgram(s) HFA Inhaler 2 Puff(s) Inhalation every 6 hours PRN Shortness of Breath and/or Wheezing  artificial  tears Solution 1 Drop(s) Both EYES every 4 hours PRN Dry Eyes  sodium chloride 0.9% lock flush 10 milliLiter(s) IV Push every 1 hour PRN Pre/post blood products, medications, blood draw, and to maintain line patency      Physical Exam    Neuro :  no focal deficits  Respiratory: CTA B/L  CV: RRR, S1S2, no murmurs,   Abdominal: Soft, NT, ND +BS, g- tube in place, dressing cdi  Extremities: b/l ue edema, + peripheral pulses      ASSESSMENT    Hypoxemia 2nd to covid pna   transaminitis  prediabetes  h/o appendectomy  cholecystectomy        PLAN    contact and airborne isolation  d/c remdesevir given covid ab positive noted   completed dexamethasone   started pulse steroids for 3 days - 250mg solumedrol bid now tapered off 4/14/21  cont asa, vit c,    cont albuterol inhaler   pulm f/u  procalcitonin, D-dimer, crp, ldh, ferritin, lactate noted ,    tmx 99.9  cont tylenol prn,   cont robitussin prn   pt on fentanyl, midazolam, precedex drip  s/p intubation 3/29/21   s/p tracheostomy 4/21/21  O2 sat  (87% - 100%) mech vent 50%  O2 via mech vent and taper fio2 as tolerated   vent mgmt as per icu  xray 3/19/21 with pneumomediastinum  rept cxr with New trace right apical pneumothorax. New mild left apical pneumothorax. Grossly stable small pneumomediastinum.  Soft tissue emphysema at the neck bases bilaterally. Grossly stable bilateral pulmonary infiltrates noted.   cxr 2/24 with No evidence of pneumothorax can be appreciated on the available image. This may be related to patient positioning. Evidence of pneumomediastinum and subcutaneous emphysema in the lower neck is again noted. There are patchy bibasilar infiltrates and elevated right hemidiaphragm noted.   cxr 3/29/21 with No significant change bilateral infiltrates. There is a small simple left apical pneumothorax. No significant pleural effusion. Bilateral subcutaneous emphysema similar to prior.   XRs on 7AM and 5PM with no obvious increase of ptx  cxr 4/4/21 with Improving bilateral airspace disease noted    cxr 4/8/21 with Small left pneumothorax noted.  thoracic surg f/u   s/p L chest tube replacement 4/18/21 for recurrence of left pneumothorax   cxr 4/20/21 with Unchanged advanced infiltrates and catheter left chest tube noted   CXR 4/11/21 with : No interval change compared to one day prior. No pneumothorax noted.   Daily CXR  Monitor O2 status   s/p tracheostomy 4/21/21   stay sutures out 2 weeks from OR date  cxr 4/21/21 with No evidence of active chest disease. Tracheostomy tube in place otherwise no significant change noted   s/p surgical placement of gastrostomy tube 4/23/2021.  id f/u   No need for further antibiotics as patient completed course of Meropenem  leukocytosis resolved  speutum cx with Enterobacter aerogenes (Carbapenem Resistant) noted above  andrea   lispro ss   prognosis poor  cont current meds  mgmt as per icu

## 2021-04-25 NOTE — PROGRESS NOTE ADULT - SUBJECTIVE AND OBJECTIVE BOX
INTERVAL HPI/OVERNIGHT EVENTS: ***    PRESSORS: [ ] YES [ ] NO    Antimicrobial:    Cardiovascular:  acetaZOLAMIDE  IVPB 250 milliGRAM(s) IV Intermittent every 12 hours  midodrine. 10 milliGRAM(s) Oral three times a day    Pulmonary:  ALBUTerol    90 MICROgram(s) HFA Inhaler 2 Puff(s) Inhalation every 6 hours PRN    Hematologic:  aspirin  chewable 81 milliGRAM(s) Oral daily  enoxaparin Injectable 40 milliGRAM(s) SubCutaneous daily    Other:  artificial  tears Solution 1 Drop(s) Both EYES every 4 hours PRN  ascorbic acid 1000 milliGRAM(s) Oral daily  chlorhexidine 0.12% Liquid 15 milliLiter(s) Oral Mucosa every 12 hours  chlorhexidine 2% Cloths 1 Application(s) Topical <User Schedule>  dexMEDEtomidine Infusion 0.2 MICROgram(s)/kG/Hr IV Continuous <Continuous>  diazepam    Tablet 5 milliGRAM(s) Oral every 6 hours  fentaNYL   Infusion 3 MICROgram(s)/kG/Hr IV Continuous <Continuous>  fentaNYL   Patch  25 MICROgram(s)/Hr. 1 Patch Transdermal every 48 hours  insulin lispro (ADMELOG) corrective regimen sliding scale   SubCutaneous every 6 hours  midazolam Infusion 0.059 mG/kG/Hr IV Continuous <Continuous>  pantoprazole  Injectable 40 milliGRAM(s) IV Push daily  polyethylene glycol 3350 17 Gram(s) Oral daily  sodium chloride 0.9% lock flush 10 milliLiter(s) IV Push every 1 hour PRN    acetaZOLAMIDE  IVPB 250 milliGRAM(s) IV Intermittent every 12 hours  ALBUTerol    90 MICROgram(s) HFA Inhaler 2 Puff(s) Inhalation every 6 hours PRN  artificial  tears Solution 1 Drop(s) Both EYES every 4 hours PRN  ascorbic acid 1000 milliGRAM(s) Oral daily  aspirin  chewable 81 milliGRAM(s) Oral daily  chlorhexidine 0.12% Liquid 15 milliLiter(s) Oral Mucosa every 12 hours  chlorhexidine 2% Cloths 1 Application(s) Topical <User Schedule>  dexMEDEtomidine Infusion 0.2 MICROgram(s)/kG/Hr IV Continuous <Continuous>  diazepam    Tablet 5 milliGRAM(s) Oral every 6 hours  enoxaparin Injectable 40 milliGRAM(s) SubCutaneous daily  fentaNYL   Infusion 3 MICROgram(s)/kG/Hr IV Continuous <Continuous>  fentaNYL   Patch  25 MICROgram(s)/Hr. 1 Patch Transdermal every 48 hours  insulin lispro (ADMELOG) corrective regimen sliding scale   SubCutaneous every 6 hours  midazolam Infusion 0.059 mG/kG/Hr IV Continuous <Continuous>  midodrine. 10 milliGRAM(s) Oral three times a day  pantoprazole  Injectable 40 milliGRAM(s) IV Push daily  polyethylene glycol 3350 17 Gram(s) Oral daily  sodium chloride 0.9% lock flush 10 milliLiter(s) IV Push every 1 hour PRN    Drug Dosing Weight  Height (cm): 167.6 (23 Apr 2021 23:15)  Weight (kg): 83.9 (23 Apr 2021 23:15)  BMI (kg/m2): 29.9 (23 Apr 2021 23:15)  BSA (m2): 1.93 (23 Apr 2021 23:15)    CENTRAL LINE: [ ] YES [ ] NO  LOCATION:   DATE INSERTED:  REMOVE: [ ] YES [ ] NO  EXPLAIN:    RIVERA: [ ] YES [ ] NO    DATE INSERTED:  REMOVE:  [ ] YES [ ] NO  EXPLAIN:    A-LINE:  [ ] YES [ ] NO  LOCATION:   DATE INSERTED:  REMOVE:  [ ] YES [ ] NO  EXPLAIN:    PMH -reviewed admission note, no change since admission      ABG - ( 25 Apr 2021 03:30 )  pH, Arterial: 7.34  pH, Blood: x     /  pCO2: 65    /  pO2: 60    / HCO3: 35    / Base Excess: 7.0   /  SaO2: 92                    04-23 @ 07:01  -  04-24 @ 07:00  --------------------------------------------------------  IN: 1136.8 mL / OUT: 770 mL / NET: 366.8 mL        Mode: AC/ CMV (Assist Control/ Continuous Mandatory Ventilation)  RR (machine): 28  TV (machine): 430  FiO2: 50  PEEP: 3  ITime: 0.7  MAP: 10  PIP: 33      PHYSICAL EXAM:    GENERAL: NAD, well-groomed, well-developed  HEAD:  Atraumatic, Normocephalic  EYES: EOMI, PERRLA, conjunctiva and sclera clear  ENMT: No tonsillar erythema, exudates, or enlargement; Moist mucous membranes, Good dentition, [ ]No lesions  NECK: Supple, normal appearance, No JVD; Normal thyroid; Trachea midline  NERVOUS SYSTEM:  Alert & Oriented X3, Good concentration; Motor Strength 5/5 B/L upper and lower extremities; DTRs 2+ intact and symmetric  CHEST/LUNG: No chest deformity; Normal percussion bilaterally; No rales, rhonchi, wheezing   HEART: Regular rate and rhythm; No murmurs, rubs, or gallops  ABDOMEN: Soft, Nontender, Nondistended;Bowel sounds present  EXTREMITIES:  2+ Peripheral Pulses, No clubbing, cyanosis, or edema  LYMPH: No lymphadenopathy noted  SKIN: No rashes or lesions; Good capillary refill      LABS:  CBC Full  -  ( 24 Apr 2021 05:25 )  WBC Count : 14.22 K/uL  RBC Count : 2.22 M/uL  Hemoglobin : 7.8 g/dL  Hematocrit : 24.3 %  Platelet Count - Automated : 319 K/uL  Mean Cell Volume : 109.5 fl  Mean Cell Hemoglobin : 35.1 pg  Mean Cell Hemoglobin Concentration : 32.1 gm/dL  Auto Neutrophil # : 10.44 K/uL  Auto Lymphocyte # : 2.31 K/uL  Auto Monocyte # : 0.62 K/uL  Auto Eosinophil # : 0.16 K/uL  Auto Basophil # : 0.06 K/uL  Auto Neutrophil % : 73.5 %  Auto Lymphocyte % : 16.2 %  Auto Monocyte % : 4.4 %  Auto Eosinophil % : 1.1 %  Auto Basophil % : 0.4 %    04-24    145  |  107  |  11  ----------------------------<  98  3.7   |  33<H>  |  <0.20<L>    Ca    8.1<L>      24 Apr 2021 05:25  Phos  2.4     04-24  Mg     1.9     04-24    TPro  6.1  /  Alb  2.1<L>  /  TBili  0.5  /  DBili  x   /  AST  21  /  ALT  19  /  AlkPhos  98  04-24            RADIOLOGY & ADDITIONAL STUDIES REVIEWED:  ***    [ ]GOALS OF CARE DISCUSSION WITH PATIENT/FAMILY/PROXY:    CRITICAL CARE TIME SPENT: 35 minutes INTERVAL HPI/OVERNIGHT EVENTS: Off propofol as urine turned green. Intubated and sedated on precedex, fentanyl patch and PO valium. Started PEG feeds.    PRESSORS: [ ] YES [ x] NO    Antimicrobial:    Cardiovascular:  acetaZOLAMIDE  IVPB 250 milliGRAM(s) IV Intermittent every 12 hours  midodrine. 10 milliGRAM(s) Oral three times a day    Pulmonary:  ALBUTerol    90 MICROgram(s) HFA Inhaler 2 Puff(s) Inhalation every 6 hours PRN    Hematologic:  aspirin  chewable 81 milliGRAM(s) Oral daily  enoxaparin Injectable 40 milliGRAM(s) SubCutaneous daily    Other:  artificial  tears Solution 1 Drop(s) Both EYES every 4 hours PRN  ascorbic acid 1000 milliGRAM(s) Oral daily  chlorhexidine 0.12% Liquid 15 milliLiter(s) Oral Mucosa every 12 hours  chlorhexidine 2% Cloths 1 Application(s) Topical <User Schedule>  dexMEDEtomidine Infusion 0.2 MICROgram(s)/kG/Hr IV Continuous <Continuous>  diazepam    Tablet 5 milliGRAM(s) Oral every 6 hours  fentaNYL   Infusion 3 MICROgram(s)/kG/Hr IV Continuous <Continuous>  fentaNYL   Patch  25 MICROgram(s)/Hr. 1 Patch Transdermal every 48 hours  insulin lispro (ADMELOG) corrective regimen sliding scale   SubCutaneous every 6 hours  midazolam Infusion 0.059 mG/kG/Hr IV Continuous <Continuous>  pantoprazole  Injectable 40 milliGRAM(s) IV Push daily  polyethylene glycol 3350 17 Gram(s) Oral daily  sodium chloride 0.9% lock flush 10 milliLiter(s) IV Push every 1 hour PRN    acetaZOLAMIDE  IVPB 250 milliGRAM(s) IV Intermittent every 12 hours  ALBUTerol    90 MICROgram(s) HFA Inhaler 2 Puff(s) Inhalation every 6 hours PRN  artificial  tears Solution 1 Drop(s) Both EYES every 4 hours PRN  ascorbic acid 1000 milliGRAM(s) Oral daily  aspirin  chewable 81 milliGRAM(s) Oral daily  chlorhexidine 0.12% Liquid 15 milliLiter(s) Oral Mucosa every 12 hours  chlorhexidine 2% Cloths 1 Application(s) Topical <User Schedule>  dexMEDEtomidine Infusion 0.2 MICROgram(s)/kG/Hr IV Continuous <Continuous>  diazepam    Tablet 5 milliGRAM(s) Oral every 6 hours  enoxaparin Injectable 40 milliGRAM(s) SubCutaneous daily  fentaNYL   Infusion 3 MICROgram(s)/kG/Hr IV Continuous <Continuous>  fentaNYL   Patch  25 MICROgram(s)/Hr. 1 Patch Transdermal every 48 hours  insulin lispro (ADMELOG) corrective regimen sliding scale   SubCutaneous every 6 hours  midazolam Infusion 0.059 mG/kG/Hr IV Continuous <Continuous>  midodrine. 10 milliGRAM(s) Oral three times a day  pantoprazole  Injectable 40 milliGRAM(s) IV Push daily  polyethylene glycol 3350 17 Gram(s) Oral daily  sodium chloride 0.9% lock flush 10 milliLiter(s) IV Push every 1 hour PRN    Drug Dosing Weight  Height (cm): 167.6 (23 Apr 2021 23:15)  Weight (kg): 83.9 (23 Apr 2021 23:15)  BMI (kg/m2): 29.9 (23 Apr 2021 23:15)  BSA (m2): 1.93 (23 Apr 2021 23:15)    CENTRAL LINE: [ ] YES [ x] NO  LOCATION:   DATE INSERTED:  REMOVE: [ ] YES [ ] NO  EXPLAIN:    RIVERA: [ ] YES [x ] NO    DATE INSERTED:  REMOVE:  [ ] YES [ ] NO  EXPLAIN:    A-LINE:  [ x] YES [ ] NO  LOCATION: [place]  DATE INSERTED:  REMOVE:  [ ] YES [ ] NO  EXPLAIN:    PMH -reviewed admission note, no change since admission      ABG - ( 25 Apr 2021 03:30 )  pH, Arterial: 7.34  pH, Blood: x     /  pCO2: 65    /  pO2: 60    / HCO3: 35    / Base Excess: 7.0   /  SaO2: 92          04-23 @ 07:01  -  04-24 @ 07:00  --------------------------------------------------------  IN: 1136.8 mL / OUT: 770 mL / NET: 366.8 mL        Mode: AC/ CMV (Assist Control/ Continuous Mandatory Ventilation)  RR (machine): 28  TV (machine): 430  FiO2: 50  PEEP: 3  ITime: 0.7  MAP: 10  PIP: 33    PHYSICAL EXAM:  GENERAL: NAD, on Vent, sedated  HEAD: Normocephalic, atraumatic  EYES:  Dry eyes, conjunctivae/sclera clear, equal pupils  NECK: Tracheostomy tube in place  NERVOUS SYSTEM:  sedated  CHEST/LUNG: B/L crackles (R>L), equal lung expansion  HEART: Regular rate and rhythm; No murmurs, rubs, or gallops, tachycardia  ABDOMEN: PEG Tube, CDI. Soft, Nontender, Nondistended; Bowel sounds present  EXTREMITIES:  b/l upper extremities edema +2,  no edema on lower legs. No clubbing, cyanosis   SKIN: No rashes  + capillary refill <2 sec    LABS:  CBC Full  -  ( 24 Apr 2021 05:25 )  WBC Count : 14.22 K/uL  RBC Count : 2.22 M/uL  Hemoglobin : 7.8 g/dL  Hematocrit : 24.3 %  Platelet Count - Automated : 319 K/uL  Mean Cell Volume : 109.5 fl  Mean Cell Hemoglobin : 35.1 pg  Mean Cell Hemoglobin Concentration : 32.1 gm/dL  Auto Neutrophil # : 10.44 K/uL  Auto Lymphocyte # : 2.31 K/uL  Auto Monocyte # : 0.62 K/uL  Auto Eosinophil # : 0.16 K/uL  Auto Basophil # : 0.06 K/uL  Auto Neutrophil % : 73.5 %  Auto Lymphocyte % : 16.2 %  Auto Monocyte % : 4.4 %  Auto Eosinophil % : 1.1 %  Auto Basophil % : 0.4 %    04-24    145  |  107  |  11  ----------------------------<  98  3.7   |  33<H>  |  <0.20<L>    Ca    8.1<L>      24 Apr 2021 05:25  Phos  2.4     04-24  Mg     1.9     04-24    TPro  6.1  /  Alb  2.1<L>  /  TBili  0.5  /  DBili  x   /  AST  21  /  ALT  19  /  AlkPhos  98  04-24      RADIOLOGY & ADDITIONAL STUDIES REVIEWED: < from: Xray Chest 1 View- PORTABLE-Routine (Xray Chest 1 View- PORTABLE-Routine in AM.) (04.23.21 @ 08:55) >    EXAM:  XR CHEST PORTABLE ROUTINE 1V                            PROCEDURE DATE:  04/23/2021          INTERPRETATION:  Portable chest radiograph    CLINICAL INFORMATION: Pneumonia due to Covid 19.    TECHNIQUE:  Portable  AP view of the chest was obtained.    COMPARISON: 4/22/2021 chest available for review.    FINDINGS:  Tracheostomy tube in place. NG tube tip in fundus of stomach.  The lungs show stable bilateral  multifocal and diffuse ill-defined airspace opacities.. No pneumothorax.    The heart and mediastinum are within normal limits.    Visualized osseous structures are intact.      IMPRESSION:   No interval change.              CARMEN MENA MD; Attending Radiologist  This document has been electronically signed. Apr 23 2021  3:41PM    < end of copied text >      [ ]GOALS OF CARE DISCUSSION WITH PATIENT/FAMILY/PROXY:    CRITICAL CARE TIME SPENT: 35 minutes INTERVAL HPI/OVERNIGHT EVENTS: Off propofol as urine turned green. Intubated and sedated on precedex, fentanyl patch and PO valium. Started tube feeds.    PRESSORS: [ ] YES [ x] NO    Antimicrobial:    Cardiovascular:  acetaZOLAMIDE  IVPB 250 milliGRAM(s) IV Intermittent every 12 hours  midodrine. 10 milliGRAM(s) Oral three times a day    Pulmonary:  ALBUTerol    90 MICROgram(s) HFA Inhaler 2 Puff(s) Inhalation every 6 hours PRN    Hematologic:  aspirin  chewable 81 milliGRAM(s) Oral daily  enoxaparin Injectable 40 milliGRAM(s) SubCutaneous daily    Other:  artificial  tears Solution 1 Drop(s) Both EYES every 4 hours PRN  ascorbic acid 1000 milliGRAM(s) Oral daily  chlorhexidine 0.12% Liquid 15 milliLiter(s) Oral Mucosa every 12 hours  chlorhexidine 2% Cloths 1 Application(s) Topical <User Schedule>  dexMEDEtomidine Infusion 0.2 MICROgram(s)/kG/Hr IV Continuous <Continuous>  diazepam    Tablet 5 milliGRAM(s) Oral every 6 hours  fentaNYL   Infusion 3 MICROgram(s)/kG/Hr IV Continuous <Continuous>  fentaNYL   Patch  25 MICROgram(s)/Hr. 1 Patch Transdermal every 48 hours  insulin lispro (ADMELOG) corrective regimen sliding scale   SubCutaneous every 6 hours  midazolam Infusion 0.059 mG/kG/Hr IV Continuous <Continuous>  pantoprazole  Injectable 40 milliGRAM(s) IV Push daily  polyethylene glycol 3350 17 Gram(s) Oral daily  sodium chloride 0.9% lock flush 10 milliLiter(s) IV Push every 1 hour PRN    acetaZOLAMIDE  IVPB 250 milliGRAM(s) IV Intermittent every 12 hours  ALBUTerol    90 MICROgram(s) HFA Inhaler 2 Puff(s) Inhalation every 6 hours PRN  artificial  tears Solution 1 Drop(s) Both EYES every 4 hours PRN  ascorbic acid 1000 milliGRAM(s) Oral daily  aspirin  chewable 81 milliGRAM(s) Oral daily  chlorhexidine 0.12% Liquid 15 milliLiter(s) Oral Mucosa every 12 hours  chlorhexidine 2% Cloths 1 Application(s) Topical <User Schedule>  dexMEDEtomidine Infusion 0.2 MICROgram(s)/kG/Hr IV Continuous <Continuous>  diazepam    Tablet 5 milliGRAM(s) Oral every 6 hours  enoxaparin Injectable 40 milliGRAM(s) SubCutaneous daily  fentaNYL   Infusion 3 MICROgram(s)/kG/Hr IV Continuous <Continuous>  fentaNYL   Patch  25 MICROgram(s)/Hr. 1 Patch Transdermal every 48 hours  insulin lispro (ADMELOG) corrective regimen sliding scale   SubCutaneous every 6 hours  midazolam Infusion 0.059 mG/kG/Hr IV Continuous <Continuous>  midodrine. 10 milliGRAM(s) Oral three times a day  pantoprazole  Injectable 40 milliGRAM(s) IV Push daily  polyethylene glycol 3350 17 Gram(s) Oral daily  sodium chloride 0.9% lock flush 10 milliLiter(s) IV Push every 1 hour PRN    Drug Dosing Weight  Height (cm): 167.6 (23 Apr 2021 23:15)  Weight (kg): 83.9 (23 Apr 2021 23:15)  BMI (kg/m2): 29.9 (23 Apr 2021 23:15)  BSA (m2): 1.93 (23 Apr 2021 23:15)    CENTRAL LINE: [ ] YES [ x] NO  LOCATION:   DATE INSERTED:  REMOVE: [ ] YES [ ] NO  EXPLAIN:    RIVERA: [ ] YES [x ] NO    DATE INSERTED:  REMOVE:  [ ] YES [ ] NO  EXPLAIN:    A-LINE:  [ x] YES [ ] NO  LOCATION: [place]  DATE INSERTED:  REMOVE:  [ ] YES [ ] NO  EXPLAIN:    PMH -reviewed admission note, no change since admission      ABG - ( 25 Apr 2021 03:30 )  pH, Arterial: 7.34  pH, Blood: x     /  pCO2: 65    /  pO2: 60    / HCO3: 35    / Base Excess: 7.0   /  SaO2: 92          04-23 @ 07:01  -  04-24 @ 07:00  --------------------------------------------------------  IN: 1136.8 mL / OUT: 770 mL / NET: 366.8 mL        Mode: AC/ CMV (Assist Control/ Continuous Mandatory Ventilation)  RR (machine): 28  TV (machine): 430  FiO2: 50  PEEP: 3  ITime: 0.7  MAP: 10  PIP: 33    PHYSICAL EXAM:  GENERAL: NAD, on Vent, sedated  HEAD: Normocephalic, atraumatic  EYES:  Dry eyes, conjunctivae/sclera clear, equal pupils  NECK: Tracheostomy tube in place  NERVOUS SYSTEM:  sedated  CHEST/LUNG: B/L crackles (R>L), equal lung expansion  HEART: Regular rate and rhythm; No murmurs, rubs, or gallops, tachycardia  ABDOMEN: PEG Tube, CDI. Soft, Nontender, Nondistended; Bowel sounds present  EXTREMITIES:  b/l upper extremities edema +2,  no edema on lower legs. No clubbing, cyanosis   SKIN: No rashes  + capillary refill <2 sec    LABS:  CBC Full  -  ( 24 Apr 2021 05:25 )  WBC Count : 14.22 K/uL  RBC Count : 2.22 M/uL  Hemoglobin : 7.8 g/dL  Hematocrit : 24.3 %  Platelet Count - Automated : 319 K/uL  Mean Cell Volume : 109.5 fl  Mean Cell Hemoglobin : 35.1 pg  Mean Cell Hemoglobin Concentration : 32.1 gm/dL  Auto Neutrophil # : 10.44 K/uL  Auto Lymphocyte # : 2.31 K/uL  Auto Monocyte # : 0.62 K/uL  Auto Eosinophil # : 0.16 K/uL  Auto Basophil # : 0.06 K/uL  Auto Neutrophil % : 73.5 %  Auto Lymphocyte % : 16.2 %  Auto Monocyte % : 4.4 %  Auto Eosinophil % : 1.1 %  Auto Basophil % : 0.4 %    04-24    145  |  107  |  11  ----------------------------<  98  3.7   |  33<H>  |  <0.20<L>    Ca    8.1<L>      24 Apr 2021 05:25  Phos  2.4     04-24  Mg     1.9     04-24    TPro  6.1  /  Alb  2.1<L>  /  TBili  0.5  /  DBili  x   /  AST  21  /  ALT  19  /  AlkPhos  98  04-24      RADIOLOGY & ADDITIONAL STUDIES REVIEWED: < from: Xray Chest 1 View- PORTABLE-Routine (Xray Chest 1 View- PORTABLE-Routine in AM.) (04.23.21 @ 08:55) >    EXAM:  XR CHEST PORTABLE ROUTINE 1V                            PROCEDURE DATE:  04/23/2021          INTERPRETATION:  Portable chest radiograph    CLINICAL INFORMATION: Pneumonia due to Covid 19.    TECHNIQUE:  Portable  AP view of the chest was obtained.    COMPARISON: 4/22/2021 chest available for review.    FINDINGS:  Tracheostomy tube in place. NG tube tip in fundus of stomach.  The lungs show stable bilateral  multifocal and diffuse ill-defined airspace opacities.. No pneumothorax.    The heart and mediastinum are within normal limits.    Visualized osseous structures are intact.      IMPRESSION:   No interval change.              CARMEN MENA MD; Attending Radiologist  This document has been electronically signed. Apr 23 2021  3:41PM    < end of copied text >      [ ]GOALS OF CARE DISCUSSION WITH PATIENT/FAMILY/PROXY:    CRITICAL CARE TIME SPENT: 35 minutes

## 2021-04-25 NOTE — PROGRESS NOTE ADULT - ASSESSMENT
Assessment and plan: 56 y/o M with no PMH is admitted to ICU for AHRF 2/2 covid19 pna       1. Acute hypoxic respiratory failure  2. ARDS 2/2 Covid pneumonia  3. Transaminitis  4. Prediabetes  5. Abnormal TSH    =================== Neuro============================  Alert and oriented x 3 at baseline   intubated and sedated 3/29 on Vent  Trach 4/22,   Continue Versed to help with vent synchrony with Fentanyl,   D/C propofol  Plan is to titrate down Versed and will start on Valium for benzo dependance,  Cut down on sedation as tolerated post procedure   Add fentanyl patch , propofol off, Precedex for agitation  CT head unremarkable     ================= Cardiovascular==========================  # Hypotension      midodrine 10 mg Q8  taper off phenylephrine as tolerated , Currently off pressers       # TACHYCARDIA: recurrent episodes   -Lopressor PRN pushes   -HR in 110's  -continue monitoring     ================- Pulm=================================  #Acute hypoxic respiratory failure: secondary to Covid pneumonia  #ARDS  -was on HFNr then got intubated 3/29  -VC Vent setting : 30/400/60/3  -D-dimer initially elevated on presentation, trending down    - s/p Nimbex and Epifanio at different occasions for vent synchrony  - s/p Pigtail on left chest , removed on 4/15  - Large Pneumothorax was noted on 4/18 on left side, s/p chest tube placement  - f/u Thoracic for F/u     -Remdesivir was discontinued due to positive antibodies   - Finished Dexamethasone   - prednisone taper, Finished    - Covid PCR negative now.,, D/C isolation       # Bacterial Pneumonia  - stable infiltrate on CXR ,likely developed VAP, Finished ABx Zosyn, meropenem, s/p 1 dose of Vanco  - MRSA negative from 3/26  - Sputum Cx growing few gram negative rods, CRE  - Pulm. dr. Ryan  - ID consult Dr Anand       #Pneumothorax and pneumomediastinum: resolved  -X-ray chest: tiny left apical pneumothorax  -Thoracic surgery following  -s/p Chest tube placed 4/8 pigtail to wall suction.  d/c'd 4/15  -X-ray monitoring daily, PTX resolved      ==================ID===================================  Likely bacterial pneumonia   -leukocytosis resolved  -No more fevers, On tylenol PRN only   - Finished Meropenem  - C-procal elevated  -plan as above     ================= Nephro================================  -pitting edema to both lower arms  -on condom cath   -monitor I/Os , good urine output overall  -s/p Albumin x 2 days on 4/10  -Lasix 40mg BID with Albumin 25% Q6 for 48 hours to help with urine output and fluid status.( 4/15) Albumin finished , was D/jennifer on 4/19, urine output decreasing  - Patient can get Lasix as needed   - Metabolic Alkalosis likely due to compensation , improving post Diamox     =================GI====================================  Transaminitis:   likely secondary to Covid19   - and  on presentation   hepatitis panel -ve  -Improved  -continue to monitor LFT    diet:   NGT (3/29) with tube feed  bowel regimen   Last BM 4/22  Peg placed ,will start on PO meds after 24 hours       ================ Heme==================================  Elevated d-dimer: likely secondary to Covid  -d-dimer 423 on admission  -last D-dimer 2160, trending down   -continue prophylactic Lovenox 40 mg daily    Anemia  Hgb 8.8, no active signs of bleeding  will transfuse  if hgb drops.     =================Endocrine===============================  Prediabetes:    -A1c  5.8  -BS controlled  -continue HSS  -monitor FS while on steroids    Abnormal TSH:  -TSH level noted 0.26,   -repeat TSH 0.37 and Free T4 1.82    ================= Skin/Catheters============================  No rashes. Peripheral IV lines.   RIJ 4/13  RRA 3/29, Removed  RBA 4/20    - =================Prophylaxis =============================  Lovenox for DVT proph : Lovenox 40mg SQ daily  Protonix for  GI proph    ==================GOC==================================   FULL CODE Assessment and plan: 54 y/o M with no PMH is admitted to ICU for AHRF 2/2 covid19 pna       1. Acute hypoxic respiratory failure  2. ARDS 2/2 Covid pneumonia  3. Transaminitis  4. Prediabetes  5. Abnormal TSH    =================== Neuro============================  Alert and oriented x 3 at baseline   intubated and sedated 3/29 on Vent  Trach 4/22,   Continue Versed to help with vent synchrony with Fentanyl,   D/C propofol  Plan is to titrate down Versed and will start on Valium for benzo dependance,  Cut down on sedation as tolerated post procedure   Add fentanyl patch , propofol off, Precedex for agitation  CT head unremarkable     ================= Cardiovascular==========================  # Hypotension      midodrine 10 mg Q8  taper off phenylephrine as tolerated , Currently off pressers       # TACHYCARDIA: recurrent episodes   -Lopressor PRN pushes   -HR in 110's  -continue monitoring     ================- Pulm=================================  #Acute hypoxic respiratory failure: secondary to Covid pneumonia  #ARDS  -was on HFNr then got intubated 3/29  -VC Vent setting : 30/400/60/3  -D-dimer initially elevated on presentation, trending down    - s/p Nimbex and Epifanio at different occasions for vent synchrony  - s/p Pigtail on left chest , removed on 4/15  - Large Pneumothorax was noted on 4/18 on left side, s/p chest tube placement  - f/u Thoracic for F/u     -Remdesivir was discontinued due to positive antibodies   - Finished Dexamethasone   - prednisone taper, Finished    - Covid PCR negative now.,, D/C isolation       # Bacterial Pneumonia  - stable infiltrate on CXR ,likely developed VAP, Finished ABx Zosyn, meropenem, s/p 1 dose of Vanco  - MRSA negative from 3/26  - Sputum Cx growing few gram negative rods, CRE  - Pulm. dr. Ryan  - ID consult Dr Anand       #Pneumothorax and pneumomediastinum: resolved  -X-ray chest: tiny left apical pneumothorax  -Thoracic surgery following  -s/p Chest tube placed 4/8 pigtail to wall suction.  d/c'd 4/15  -X-ray monitoring daily, PTX resolved      ==================ID===================================  Likely bacterial pneumonia   -leukocytosis resolved  -No more fevers, On tylenol PRN only   - Finished Meropenem  - C-procal elevated  -plan as above     ================= Nephro================================  -pitting edema to both lower arms  -on condom cath   -monitor I/Os , good urine output overall  -s/p Albumin x 2 days on 4/10  -Lasix 40mg BID with Albumin 25% Q6 for 48 hours to help with urine output and fluid status.( 4/15) Albumin finished , was D/jennifer on 4/19, urine output decreasing  - Patient can get Lasix as needed   - Metabolic Alkalosis likely due to compensation , improving post Diamox     =================GI====================================  Transaminitis:   likely secondary to Covid19   - and  on presentation   hepatitis panel -ve  -Improved  -continue to monitor LFT    diet:   NGT (3/29) with tube feed  bowel regimen   Last BM 4/22  Peg placed ,will start on PO meds after 24 hours       ================ Heme==================================  Elevated d-dimer: likely secondary to Covid  -d-dimer 423 on admission  -last D-dimer 2160, trending down   -continue prophylactic Lovenox 40 mg daily    Anemia  Hgb 8.8, no active signs of bleeding  will transfuse  if hgb drops.     =================Endocrine===============================  Prediabetes:    -A1c  5.8  -BS controlled  -continue HSS  -monitor FS while on steroids    Abnormal TSH:  -TSH level noted 0.26,   -repeat TSH 0.37 and Free T4 1.82    ================= Skin/Catheters============================  No rashes. Peripheral IV lines.   RIJ 4/13  RRA 3/29, Removed  RBA 4/20.    - =================Prophylaxis =============================  Lovenox for DVT proph : Lovenox 40mg SQ daily  Protonix for  GI proph    ==================GOC==================================   FULL CODE

## 2021-04-25 NOTE — PROGRESS NOTE ADULT - SUBJECTIVE AND OBJECTIVE BOX
Pt is sedated, on MV support via trach. Saturating 99%. In ICU. G-Tube in place.     INTERVAL HPI/OVERNIGHT EVENTS:      VITAL SIGNS:  T(F): 98.2 (04-25-21 @ 09:30)  HR: 89 (04-25-21 @ 10:00)  BP: --  RR: 28 (04-25-21 @ 10:00)  SpO2: 99% (04-25-21 @ 10:00)  Wt(kg): --  I&O's Detail    24 Apr 2021 07:01  -  25 Apr 2021 07:00  --------------------------------------------------------  IN:    Dexmedetomidine: 252 mL    Enteral Tube Flush: 50 mL    FentaNYL: 784 mL    IV PiggyBack: 250 mL    IV PiggyBack: 100 mL    Jevity 1.5: 70 mL    Midazolam: 120 mL  Total IN: 1626 mL    OUT:    Chest Tube (mL): 10 mL    Incontinent per Condom Catheter (mL): 1030 mL  Total OUT: 1040 mL    Total NET: 586 mL      25 Apr 2021 07:01  -  25 Apr 2021 10:42  --------------------------------------------------------  IN:    Dexmedetomidine: 31.5 mL    FentaNYL: 67 mL    Jevity 1.5: 65 mL    Midazolam: 15 mL  Total IN: 178.5 mL    OUT:  Total OUT: 0 mL    Total NET: 178.5 mL        Mode: AC/ CMV (Assist Control/ Continuous Mandatory Ventilation)  RR (machine): 28  TV (machine): 430  FiO2: 60  PEEP: 3  ITime: 0.7  MAP: 14  PIP: 41        REVIEW OF SYSTEMS:    Unable to assess; Sedated.     PHYSICAL EXAM:    Constitutional: Well developed and nourished  Eyes:Perrla  ENMT: normal  Neck:supple  Respiratory: MV via trach   Cardiovascular: S1 S2 regular  Gastrointestinal: g-tube  Extremities: No edema  Vascular:normal  Neurological: sedated   Musculoskeletal: bed      MEDICATIONS  (STANDING):  acetaZOLAMIDE  IVPB 250 milliGRAM(s) IV Intermittent every 12 hours  ascorbic acid 1000 milliGRAM(s) Oral daily  aspirin  chewable 81 milliGRAM(s) Oral daily  chlorhexidine 0.12% Liquid 15 milliLiter(s) Oral Mucosa every 12 hours  chlorhexidine 2% Cloths 1 Application(s) Topical <User Schedule>  dexMEDEtomidine Infusion 0.2 MICROgram(s)/kG/Hr (3.9 mL/Hr) IV Continuous <Continuous>  diazepam    Tablet 5 milliGRAM(s) Oral every 6 hours  enoxaparin Injectable 40 milliGRAM(s) SubCutaneous daily  fentaNYL   Infusion 3 MICROgram(s)/kG/Hr (25.2 mL/Hr) IV Continuous <Continuous>  fentaNYL   Patch  25 MICROgram(s)/Hr. 1 Patch Transdermal every 48 hours  insulin lispro (ADMELOG) corrective regimen sliding scale   SubCutaneous every 6 hours  midazolam Infusion 0.059 mG/kG/Hr (4.95 mL/Hr) IV Continuous <Continuous>  midodrine. 10 milliGRAM(s) Oral three times a day  pantoprazole  Injectable 40 milliGRAM(s) IV Push daily  polyethylene glycol 3350 17 Gram(s) Oral daily    MEDICATIONS  (PRN):  ALBUTerol    90 MICROgram(s) HFA Inhaler 2 Puff(s) Inhalation every 6 hours PRN Shortness of Breath and/or Wheezing  artificial  tears Solution 1 Drop(s) Both EYES every 4 hours PRN Dry Eyes  sodium chloride 0.9% lock flush 10 milliLiter(s) IV Push every 1 hour PRN Pre/post blood products, medications, blood draw, and to maintain line patency      Allergies    No Known Allergies    Intolerances        LABS:                        7.5    11.80 )-----------( 284      ( 25 Apr 2021 06:14 )             23.8     04-25    147<H>  |  109<H>  |  12  ----------------------------<  92  3.5   |  32<H>  |  <0.20<L>    Ca    8.3<L>      25 Apr 2021 06:14  Phos  2.9     04-25  Mg     2.3     04-25    TPro  5.9<L>  /  Alb  1.8<L>  /  TBili  0.6  /  DBili  x   /  AST  19  /  ALT  16  /  AlkPhos  92  04-25        ABG - ( 25 Apr 2021 03:30 )  pH, Arterial: 7.34  pH, Blood: x     /  pCO2: 65    /  pO2: 60    / HCO3: 35    / Base Excess: 7.0   /  SaO2: 92          CAPILLARY BLOOD GLUCOSE      POCT Blood Glucose.: 97 mg/dL (25 Apr 2021 06:32)    pro-bnp -- 04-23 @ 06:21     d-dimer 1097  04-23 @ 06:21  pro-bnp -- 04-22 @ 03:55     d-dimer 1367  04-22 @ 03:55  pro-bnp -- 04-21 @ 03:58     d-dimer 2160  04-21 @ 03:58  pro-bnp -- 04-20 @ 03:50     d-dimer 2911  04-20 @ 03:50  pro-bnp -- 04-19 @ 04:14     d-dimer 2394  04-19 @ 04:14      RADIOLOGY & ADDITIONAL TESTS:    CXR:  < from: Xray Chest 1 View- PORTABLE-Routine (Xray Chest 1 View- PORTABLE-Routine in AM.) (04.25.21 @ 09:25) >  IMPRESSION:   A 40% LEFT pneumothorax.  LEFT multi-sidehole pigtail catheter overlies LEFT lower hemithorax..  Bilateral multifocal and diffuse ill-defined airspace opacities..    Follow-up AP portable chest radiograph 4/25/2021 AT 8:58 AM:  Residual LEFT 30% upper zone pneumothorax.  Otherwise no interval change...    < end of copied text >    Ct scan chest:    ekg;    echo:

## 2021-04-25 NOTE — PROGRESS NOTE ADULT - ATTENDING COMMENTS
55 yr old  man , non smoker with  moody 1990s presented 3/14 with x9 days worsening cough, subjective fevers, and SOB, with x2-3 days dysuria and central, non-radiating, constant CP. Admitted to medicine unit  for acute hypoxic respiratory failure secondary to pna from covid-19 infection .     Assessment:  1. Acute hypoxic respiratory failure  2. Covid-19 infection   3. Transaminitis  4. Prediabetes  5. Bilateral pneumothorax  6. Septic shock     Plan   -S/p tracheostomy placement 4/21   - S/p G-tube 4/23  - Surgery f/u .. can use G-tube for feeding   -Completed antibiotics  -thoracic f/u noted   -adjust vent as per ABG, repeat ABG improved  -permissive hypercapnia   -change propofol to precedex due to hypertriglyceridemia   -S/p diamox as patient with metabolic alkalosis  -PTX  improved post chest tube placement, place chest tube to water seal  -Cont. versed/fentanyl as patient asynchronous with the vent  -Cont. midodrine  -taper off pressors   -monitor biomarkers daily, trending down   -Tube feedings held as plan for PEG today  -dvt/gi prophy  -hemodynamic monitoring   -decrease sedadtion slowly   -Prognosis is guarded .

## 2021-04-26 LAB
ALBUMIN SERPL ELPH-MCNC: 1.8 G/DL — LOW (ref 3.5–5)
ALP SERPL-CCNC: 103 U/L — SIGNIFICANT CHANGE UP (ref 40–120)
ALT FLD-CCNC: 17 U/L DA — SIGNIFICANT CHANGE UP (ref 10–60)
ANION GAP SERPL CALC-SCNC: 2 MMOL/L — LOW (ref 5–17)
ANISOCYTOSIS BLD QL: SLIGHT — SIGNIFICANT CHANGE UP
AST SERPL-CCNC: 19 U/L — SIGNIFICANT CHANGE UP (ref 10–40)
BASE EXCESS BLDA CALC-SCNC: 14.5 MMOL/L — HIGH (ref -2–3)
BASOPHILS # BLD AUTO: 0 K/UL — SIGNIFICANT CHANGE UP (ref 0–0.2)
BASOPHILS NFR BLD AUTO: 0 % — SIGNIFICANT CHANGE UP (ref 0–2)
BILIRUB SERPL-MCNC: 0.4 MG/DL — SIGNIFICANT CHANGE UP (ref 0.2–1.2)
BLD GP AB SCN SERPL QL: SIGNIFICANT CHANGE UP
BLOOD GAS COMMENTS ARTERIAL: SIGNIFICANT CHANGE UP
BUN SERPL-MCNC: 10 MG/DL — SIGNIFICANT CHANGE UP (ref 7–18)
CALCIUM SERPL-MCNC: 8 MG/DL — LOW (ref 8.4–10.5)
CHLORIDE SERPL-SCNC: 108 MMOL/L — SIGNIFICANT CHANGE UP (ref 96–108)
CO2 SERPL-SCNC: 34 MMOL/L — HIGH (ref 22–31)
CREAT SERPL-MCNC: 0.27 MG/DL — LOW (ref 0.5–1.3)
D DIMER BLD IA.RAPID-MCNC: 1434 NG/ML DDU — HIGH
EOSINOPHIL # BLD AUTO: 0.17 K/UL — SIGNIFICANT CHANGE UP (ref 0–0.5)
EOSINOPHIL NFR BLD AUTO: 2 % — SIGNIFICANT CHANGE UP (ref 0–6)
GLUCOSE BLDC GLUCOMTR-MCNC: 122 MG/DL — HIGH (ref 70–99)
GLUCOSE BLDC GLUCOMTR-MCNC: 149 MG/DL — HIGH (ref 70–99)
GLUCOSE BLDC GLUCOMTR-MCNC: 156 MG/DL — HIGH (ref 70–99)
GLUCOSE SERPL-MCNC: 141 MG/DL — HIGH (ref 70–99)
HCO3 BLDA-SCNC: 43 MMOL/L — HIGH (ref 21–28)
HCT VFR BLD CALC: 19.4 % — CRITICAL LOW (ref 39–50)
HCT VFR BLD CALC: 21.7 % — LOW (ref 39–50)
HGB BLD-MCNC: 6.8 G/DL — CRITICAL LOW (ref 13–17)
HGB BLD-MCNC: 7.2 G/DL — LOW (ref 13–17)
HOROWITZ INDEX BLDA+IHG-RTO: 60 — SIGNIFICANT CHANGE UP
HYPOCHROMIA BLD QL: SLIGHT — SIGNIFICANT CHANGE UP
LG PLATELETS BLD QL AUTO: SLIGHT — SIGNIFICANT CHANGE UP
LYMPHOCYTES # BLD AUTO: 1.7 K/UL — SIGNIFICANT CHANGE UP (ref 1–3.3)
LYMPHOCYTES # BLD AUTO: 20 % — SIGNIFICANT CHANGE UP (ref 13–44)
MAGNESIUM SERPL-MCNC: 2.1 MG/DL — SIGNIFICANT CHANGE UP (ref 1.6–2.6)
MCHC RBC-ENTMCNC: 33.2 GM/DL — SIGNIFICANT CHANGE UP (ref 32–36)
MCHC RBC-ENTMCNC: 35.1 GM/DL — SIGNIFICANT CHANGE UP (ref 32–36)
MCHC RBC-ENTMCNC: 36.4 PG — HIGH (ref 27–34)
MCHC RBC-ENTMCNC: 39.5 PG — HIGH (ref 27–34)
MCV RBC AUTO: 109.6 FL — HIGH (ref 80–100)
MCV RBC AUTO: 112.8 FL — HIGH (ref 80–100)
MONOCYTES # BLD AUTO: 0.76 K/UL — SIGNIFICANT CHANGE UP (ref 0–0.9)
MONOCYTES NFR BLD AUTO: 9 % — SIGNIFICANT CHANGE UP (ref 2–14)
NEUTROPHILS # BLD AUTO: 5.85 K/UL — SIGNIFICANT CHANGE UP (ref 1.8–7.4)
NEUTROPHILS NFR BLD AUTO: 69 % — SIGNIFICANT CHANGE UP (ref 43–77)
NRBC # BLD: 0 /100 WBCS — SIGNIFICANT CHANGE UP (ref 0–0)
NRBC # BLD: 0 /100 — SIGNIFICANT CHANGE UP (ref 0–0)
OVALOCYTES BLD QL SMEAR: SLIGHT — SIGNIFICANT CHANGE UP
PCO2 BLDA: 69 MMHG — HIGH (ref 35–48)
PH BLDA: 7.4 — SIGNIFICANT CHANGE UP (ref 7.35–7.45)
PHOSPHATE SERPL-MCNC: 3.1 MG/DL — SIGNIFICANT CHANGE UP (ref 2.5–4.5)
PLAT MORPH BLD: NORMAL — SIGNIFICANT CHANGE UP
PLATELET # BLD AUTO: 243 K/UL — SIGNIFICANT CHANGE UP (ref 150–400)
PLATELET # BLD AUTO: 245 K/UL — SIGNIFICANT CHANGE UP (ref 150–400)
PO2 BLDA: 71 MMHG — LOW (ref 83–108)
POIKILOCYTOSIS BLD QL AUTO: SLIGHT — SIGNIFICANT CHANGE UP
POLYCHROMASIA BLD QL SMEAR: SLIGHT — SIGNIFICANT CHANGE UP
POTASSIUM SERPL-MCNC: 3.6 MMOL/L — SIGNIFICANT CHANGE UP (ref 3.5–5.3)
POTASSIUM SERPL-SCNC: 3.6 MMOL/L — SIGNIFICANT CHANGE UP (ref 3.5–5.3)
PROT SERPL-MCNC: 5.7 G/DL — LOW (ref 6–8.3)
RBC # BLD: 1.72 M/UL — LOW (ref 4.2–5.8)
RBC # BLD: 1.98 M/UL — LOW (ref 4.2–5.8)
RBC # FLD: 18.1 % — HIGH (ref 10.3–14.5)
RBC # FLD: 19 % — HIGH (ref 10.3–14.5)
RBC BLD AUTO: ABNORMAL
SAO2 % BLDA: SIGNIFICANT CHANGE UP %
SCHISTOCYTES BLD QL AUTO: SLIGHT — SIGNIFICANT CHANGE UP
SODIUM SERPL-SCNC: 144 MMOL/L — SIGNIFICANT CHANGE UP (ref 135–145)
STOMATOCYTES BLD QL SMEAR: SLIGHT — SIGNIFICANT CHANGE UP
WBC # BLD: 8.3 K/UL — SIGNIFICANT CHANGE UP (ref 3.8–10.5)
WBC # BLD: 8.48 K/UL — SIGNIFICANT CHANGE UP (ref 3.8–10.5)
WBC # FLD AUTO: 8.3 K/UL — SIGNIFICANT CHANGE UP (ref 3.8–10.5)
WBC # FLD AUTO: 8.48 K/UL — SIGNIFICANT CHANGE UP (ref 3.8–10.5)

## 2021-04-26 PROCEDURE — 71045 X-RAY EXAM CHEST 1 VIEW: CPT | Mod: 26

## 2021-04-26 RX ORDER — MIDAZOLAM HYDROCHLORIDE 1 MG/ML
2 INJECTION, SOLUTION INTRAMUSCULAR; INTRAVENOUS ONCE
Refills: 0 | Status: DISCONTINUED | OUTPATIENT
Start: 2021-04-26 | End: 2021-04-26

## 2021-04-26 RX ORDER — DEXMEDETOMIDINE HYDROCHLORIDE IN 0.9% SODIUM CHLORIDE 4 UG/ML
1 INJECTION INTRAVENOUS
Qty: 400 | Refills: 0 | Status: DISCONTINUED | OUTPATIENT
Start: 2021-04-26 | End: 2021-04-28

## 2021-04-26 RX ORDER — ACETYLCYSTEINE 200 MG/ML
10 VIAL (ML) MISCELLANEOUS THREE TIMES A DAY
Refills: 0 | Status: DISCONTINUED | OUTPATIENT
Start: 2021-04-26 | End: 2021-04-26

## 2021-04-26 RX ORDER — PANTOPRAZOLE SODIUM 20 MG/1
40 TABLET, DELAYED RELEASE ORAL EVERY 12 HOURS
Refills: 0 | Status: DISCONTINUED | OUTPATIENT
Start: 2021-04-26 | End: 2021-05-11

## 2021-04-26 RX ADMIN — Medication 5 MILLIGRAM(S): at 05:06

## 2021-04-26 RX ADMIN — FENTANYL CITRATE 1 PATCH: 50 INJECTION INTRAVENOUS at 14:02

## 2021-04-26 RX ADMIN — CHLORHEXIDINE GLUCONATE 15 MILLILITER(S): 213 SOLUTION TOPICAL at 18:19

## 2021-04-26 RX ADMIN — MIDAZOLAM HYDROCHLORIDE 2 MILLIGRAM(S): 1 INJECTION, SOLUTION INTRAMUSCULAR; INTRAVENOUS at 04:36

## 2021-04-26 RX ADMIN — Medication 5 MILLIGRAM(S): at 17:25

## 2021-04-26 RX ADMIN — MIDODRINE HYDROCHLORIDE 10 MILLIGRAM(S): 2.5 TABLET ORAL at 11:34

## 2021-04-26 RX ADMIN — FENTANYL CITRATE 1 PATCH: 50 INJECTION INTRAVENOUS at 14:07

## 2021-04-26 RX ADMIN — FENTANYL CITRATE 1 PATCH: 50 INJECTION INTRAVENOUS at 07:33

## 2021-04-26 RX ADMIN — CHLORHEXIDINE GLUCONATE 1 APPLICATION(S): 213 SOLUTION TOPICAL at 05:03

## 2021-04-26 RX ADMIN — Medication 81 MILLIGRAM(S): at 11:34

## 2021-04-26 RX ADMIN — FENTANYL CITRATE 25.2 MICROGRAM(S)/KG/HR: 50 INJECTION INTRAVENOUS at 04:36

## 2021-04-26 RX ADMIN — FENTANYL CITRATE 1 PATCH: 50 INJECTION INTRAVENOUS at 19:40

## 2021-04-26 RX ADMIN — Medication 1000 MILLIGRAM(S): at 11:34

## 2021-04-26 RX ADMIN — Medication 5 MILLIGRAM(S): at 11:34

## 2021-04-26 RX ADMIN — CHLORHEXIDINE GLUCONATE 15 MILLILITER(S): 213 SOLUTION TOPICAL at 05:03

## 2021-04-26 RX ADMIN — MIDODRINE HYDROCHLORIDE 10 MILLIGRAM(S): 2.5 TABLET ORAL at 05:07

## 2021-04-26 RX ADMIN — MIDODRINE HYDROCHLORIDE 10 MILLIGRAM(S): 2.5 TABLET ORAL at 17:25

## 2021-04-26 RX ADMIN — ENOXAPARIN SODIUM 40 MILLIGRAM(S): 100 INJECTION SUBCUTANEOUS at 11:34

## 2021-04-26 RX ADMIN — Medication 5 MILLIGRAM(S): at 23:26

## 2021-04-26 RX ADMIN — FENTANYL CITRATE 8.39 MICROGRAM(S)/KG/HR: 50 INJECTION INTRAVENOUS at 14:07

## 2021-04-26 RX ADMIN — POLYETHYLENE GLYCOL 3350 17 GRAM(S): 17 POWDER, FOR SOLUTION ORAL at 11:36

## 2021-04-26 RX ADMIN — DEXMEDETOMIDINE HYDROCHLORIDE IN 0.9% SODIUM CHLORIDE 21 MICROGRAM(S)/KG/HR: 4 INJECTION INTRAVENOUS at 17:25

## 2021-04-26 RX ADMIN — DEXMEDETOMIDINE HYDROCHLORIDE IN 0.9% SODIUM CHLORIDE 21 MICROGRAM(S)/KG/HR: 4 INJECTION INTRAVENOUS at 10:40

## 2021-04-26 RX ADMIN — DEXMEDETOMIDINE HYDROCHLORIDE IN 0.9% SODIUM CHLORIDE 3.9 MICROGRAM(S)/KG/HR: 4 INJECTION INTRAVENOUS at 05:04

## 2021-04-26 RX ADMIN — PANTOPRAZOLE SODIUM 40 MILLIGRAM(S): 20 TABLET, DELAYED RELEASE ORAL at 17:26

## 2021-04-26 NOTE — PROGRESS NOTE ADULT - SUBJECTIVE AND OBJECTIVE BOX
Patient is a 55y old  Male who presents with a chief complaint of SOB (25 Apr 2021 10:41)    pt seen in icu [ x ], reg med floor [   ], bed [ x ], chair at bedside [   ], a+o x3 [  ], sedated [x  ],  nad [x  ]    andrea [ x ], g-tube feeding [x  ], tracheostomy tube [ x ], cent line [x  ],        Allergies    No Known Allergies        Vitals    T(F): 100.1 (04-26-21 @ 04:00), Max: 100.1 (04-26-21 @ 04:00)  HR: 102 (04-26-21 @ 06:00) (78 - 126)  BP: --  RR: 14 (04-26-21 @ 06:00) (14 - 37)  SpO2: 96% (04-26-21 @ 06:00) (92% - 100%)  Wt(kg): --  CAPILLARY BLOOD GLUCOSE      POCT Blood Glucose.: 149 mg/dL (26 Apr 2021 05:10)      Labs                          6.8    8.48  )-----------( 245      ( 26 Apr 2021 05:42 )             19.4       04-26    144  |  108  |  10  ----------------------------<  141<H>  3.6   |  34<H>  |  0.27<L>    Ca    8.0<L>      26 Apr 2021 05:42  Phos  3.1     04-26  Mg     2.1     04-26    TPro  5.7<L>  /  Alb  1.8<L>  /  TBili  0.4  /  DBili  x   /  AST  19  /  ALT  17  /  AlkPhos  103  04-26            .Sputum Sputum  04-16 @ 04:42   Moderate Enterobacter aerogenes (Carbapenem Resistant)  Normal Respiratory Monserrat present  --  Enterobacter aerogenes (Carbapenem Resistant)      .Urine Clean Catch (Midstream)  03-15 @ 00:52   No growth  --  --          Radiology Results      Meds    MEDICATIONS  (STANDING):  ascorbic acid 1000 milliGRAM(s) Oral daily  aspirin  chewable 81 milliGRAM(s) Oral daily  chlorhexidine 0.12% Liquid 15 milliLiter(s) Oral Mucosa every 12 hours  chlorhexidine 2% Cloths 1 Application(s) Topical <User Schedule>  dexMEDEtomidine Infusion 0.2 MICROgram(s)/kG/Hr (3.9 mL/Hr) IV Continuous <Continuous>  diazepam    Tablet 5 milliGRAM(s) Oral every 6 hours  enoxaparin Injectable 40 milliGRAM(s) SubCutaneous daily  fentaNYL   Infusion 3 MICROgram(s)/kG/Hr (25.2 mL/Hr) IV Continuous <Continuous>  fentaNYL   Patch  25 MICROgram(s)/Hr. 1 Patch Transdermal every 48 hours  insulin lispro (ADMELOG) corrective regimen sliding scale   SubCutaneous every 6 hours  midodrine. 10 milliGRAM(s) Oral three times a day  pantoprazole  Injectable 40 milliGRAM(s) IV Push every 12 hours  polyethylene glycol 3350 17 Gram(s) Oral daily      MEDICATIONS  (PRN):  ALBUTerol    90 MICROgram(s) HFA Inhaler 2 Puff(s) Inhalation every 6 hours PRN Shortness of Breath and/or Wheezing  artificial  tears Solution 1 Drop(s) Both EYES every 4 hours PRN Dry Eyes  sodium chloride 0.9% lock flush 10 milliLiter(s) IV Push every 1 hour PRN Pre/post blood products, medications, blood draw, and to maintain line patency      Physical Exam    Neuro :  no focal deficits  Respiratory: CTA B/L  CV: RRR, S1S2, no murmurs,   Abdominal: Soft, NT, ND +BS, g- tube in place, dressing cdi  Extremities: b/l ue edema, + peripheral pulses      ASSESSMENT    Hypoxemia 2nd to covid pna   transaminitis  prediabetes  h/o appendectomy  cholecystectomy        PLAN    contact and airborne isolation  d/c remdesevir given covid ab positive noted   completed dexamethasone   started pulse steroids for 3 days - 250mg solumedrol bid now tapered off 4/14/21  cont asa, vit c,    cont albuterol inhaler   pulm f/u  procalcitonin, D-dimer, crp, ldh, ferritin, lactate noted ,    tmx 99.9  cont tylenol prn,   cont robitussin prn   pt on fentanyl, midazolam, precedex drip  s/p intubation 3/29/21   s/p tracheostomy 4/21/21  O2 sat  (87% - 100%) mech vent 50%  O2 via mech vent and taper fio2 as tolerated   vent mgmt as per icu  xray 3/19/21 with pneumomediastinum  rept cxr with New trace right apical pneumothorax. New mild left apical pneumothorax. Grossly stable small pneumomediastinum.  Soft tissue emphysema at the neck bases bilaterally. Grossly stable bilateral pulmonary infiltrates noted.   cxr 2/24 with No evidence of pneumothorax can be appreciated on the available image. This may be related to patient positioning. Evidence of pneumomediastinum and subcutaneous emphysema in the lower neck is again noted. There are patchy bibasilar infiltrates and elevated right hemidiaphragm noted.   cxr 3/29/21 with No significant change bilateral infiltrates. There is a small simple left apical pneumothorax. No significant pleural effusion. Bilateral subcutaneous emphysema similar to prior.   XRs on 7AM and 5PM with no obvious increase of ptx  cxr 4/4/21 with Improving bilateral airspace disease noted    cxr 4/8/21 with Small left pneumothorax noted.  thoracic surg f/u   s/p L chest tube replacement 4/18/21 for recurrence of left pneumothorax   cxr 4/20/21 with Unchanged advanced infiltrates and catheter left chest tube noted   CXR 4/11/21 with : No interval change compared to one day prior. No pneumothorax noted.   Daily CXR  Monitor O2 status   s/p tracheostomy 4/21/21   stay sutures out 2 weeks from OR date  cxr 4/21/21 with No evidence of active chest disease. Tracheostomy tube in place otherwise no significant change noted   s/p surgical placement of gastrostomy tube 4/23/2021.  id f/u   No need for further antibiotics as patient completed course of Meropenem  leukocytosis resolved  speutum cx with Enterobacter aerogenes (Carbapenem Resistant) noted above  zully   lispro ss   prognosis poor  cont current meds  mgmt as per icu      Patient is a 55y old  Male who presents with a chief complaint of SOB (25 Apr 2021 10:41)    pt seen in icu [ x ], reg med floor [   ], bed [ x ], chair at bedside [   ], a+o x3 [  ], sedated [x  ],  nad [x  ]    andrea [ x ], g-tube feeding [x  ], tracheostomy tube [ x ], cent line [x  ],        Allergies    No Known Allergies        Vitals    T(F): 100.1 (04-26-21 @ 04:00), Max: 100.1 (04-26-21 @ 04:00)  HR: 102 (04-26-21 @ 06:00) (78 - 126)  BP: --  RR: 14 (04-26-21 @ 06:00) (14 - 37)  SpO2: 96% (04-26-21 @ 06:00) (92% - 100%)  Wt(kg): --  CAPILLARY BLOOD GLUCOSE      POCT Blood Glucose.: 149 mg/dL (26 Apr 2021 05:10)      Labs                          6.8    8.48  )-----------( 245      ( 26 Apr 2021 05:42 )             19.4       04-26    144  |  108  |  10  ----------------------------<  141<H>  3.6   |  34<H>  |  0.27<L>    Ca    8.0<L>      26 Apr 2021 05:42  Phos  3.1     04-26  Mg     2.1     04-26    TPro  5.7<L>  /  Alb  1.8<L>  /  TBili  0.4  /  DBili  x   /  AST  19  /  ALT  17  /  AlkPhos  103  04-26            .Sputum Sputum  04-16 @ 04:42   Moderate Enterobacter aerogenes (Carbapenem Resistant)  Normal Respiratory Monserrat present  --  Enterobacter aerogenes (Carbapenem Resistant)      .Urine Clean Catch (Midstream)  03-15 @ 00:52   No growth  --  --          Radiology Results      < from: Xray Chest 1 View- PORTABLE-Routine (Xray Chest 1 View- PORTABLE-Routine in AM.) (04.25.21 @ 09:25) >  IMPRESSION:   A 40% LEFT pneumothorax.  LEFT multi-sidehole pigtail catheter overlies LEFT lower hemithorax..  Bilateral multifocal and diffuse ill-defined airspace opacities..    Follow-up AP portable chest radiograph 4/25/2021 AT 8:58 AM:  Residual LEFT 30% upper zone pneumothorax.  Otherwise no interval change...    < end of copied text >    Meds    MEDICATIONS  (STANDING):  ascorbic acid 1000 milliGRAM(s) Oral daily  aspirin  chewable 81 milliGRAM(s) Oral daily  chlorhexidine 0.12% Liquid 15 milliLiter(s) Oral Mucosa every 12 hours  chlorhexidine 2% Cloths 1 Application(s) Topical <User Schedule>  dexMEDEtomidine Infusion 0.2 MICROgram(s)/kG/Hr (3.9 mL/Hr) IV Continuous <Continuous>  diazepam    Tablet 5 milliGRAM(s) Oral every 6 hours  enoxaparin Injectable 40 milliGRAM(s) SubCutaneous daily  fentaNYL   Infusion 3 MICROgram(s)/kG/Hr (25.2 mL/Hr) IV Continuous <Continuous>  fentaNYL   Patch  25 MICROgram(s)/Hr. 1 Patch Transdermal every 48 hours  insulin lispro (ADMELOG) corrective regimen sliding scale   SubCutaneous every 6 hours  midodrine. 10 milliGRAM(s) Oral three times a day  pantoprazole  Injectable 40 milliGRAM(s) IV Push every 12 hours  polyethylene glycol 3350 17 Gram(s) Oral daily      MEDICATIONS  (PRN):  ALBUTerol    90 MICROgram(s) HFA Inhaler 2 Puff(s) Inhalation every 6 hours PRN Shortness of Breath and/or Wheezing  artificial  tears Solution 1 Drop(s) Both EYES every 4 hours PRN Dry Eyes  sodium chloride 0.9% lock flush 10 milliLiter(s) IV Push every 1 hour PRN Pre/post blood products, medications, blood draw, and to maintain line patency      Physical Exam    Neuro :  no focal deficits  Respiratory: CTA B/L  CV: RRR, S1S2, no murmurs,   Abdominal: Soft, NT, ND +BS, g- tube in place, dressing cdi  Extremities: b/l ue edema, + peripheral pulses      ASSESSMENT    Hypoxemia 2nd to covid pna   transaminitis  prediabetes  h/o appendectomy  cholecystectomy        PLAN    contact and airborne isolation  d/c remdesevir given covid ab positive noted   completed dexamethasone   started pulse steroids for 3 days - 250mg solumedrol bid now tapered off 4/14/21  cont asa, vit c,    cont albuterol inhaler   pulm f/u  procalcitonin, D-dimer, crp, ldh, ferritin, lactate noted ,    tmx 99.9  cont tylenol prn,   cont robitussin prn   pt on fentanyl, precedex drip  s/p intubation 3/29/21   s/p tracheostomy 4/21/21  O2 sat  (87% - 100%) mech vent 50%  O2 via mech vent and taper fio2 as tolerated   vent mgmt as per icu  xray 3/19/21 with pneumomediastinum  rept cxr with New trace right apical pneumothorax. New mild left apical pneumothorax. Grossly stable small pneumomediastinum.  Soft tissue emphysema at the neck bases bilaterally. Grossly stable bilateral pulmonary infiltrates noted.   cxr 2/24 with No evidence of pneumothorax can be appreciated on the available image. This may be related to patient positioning. Evidence of pneumomediastinum and subcutaneous emphysema in the lower neck is again noted. There are patchy bibasilar infiltrates and elevated right hemidiaphragm noted.   cxr 3/29/21 with No significant change bilateral infiltrates. There is a small simple left apical pneumothorax. No significant pleural effusion. Bilateral subcutaneous emphysema similar to prior.   XRs on 7AM and 5PM with no obvious increase of ptx  cxr 4/4/21 with Improving bilateral airspace disease noted    cxr 4/8/21 with Small left pneumothorax noted.  thoracic surg f/u   s/p L chest tube replacement 4/18/21 for recurrence of left pneumothorax   cxr 4/20/21 with Unchanged advanced infiltrates and catheter left chest tube noted   CXR 4/11/21 with : No interval change compared to one day prior. No pneumothorax noted.   Daily CXR  Monitor O2 status   s/p tracheostomy 4/21/21   stay sutures out 2 weeks from OR date  cxr 4/21/21 with No evidence of active chest disease. Tracheostomy tube in place otherwise no significant change noted   cxr 4/25/21 with A 40% LEFT pneumothorax. LEFT multi-sidehole pigtail catheter overlies LEFT lower hemithorax.. Bilateral multifocal and diffuse ill-defined airspace opacities..  Follow-up AP portable chest radiograph 4/25/2021 AT 8:58 AM: Residual LEFT 30% upper zone pneumothorax. Otherwise no interval change.noted above..  s/p surgical placement of gastrostomy tube 4/23/2021.   pt started on tube feeding  id f/u   No need for further antibiotics as patient completed course of Meropenem  leukocytosis resolved  speutum cx with Enterobacter aerogenes (Carbapenem Resistant) noted above  andrea   lispro ss   prognosis poor  cont current meds  mgmt as per icu

## 2021-04-26 NOTE — PROGRESS NOTE ADULT - SUBJECTIVE AND OBJECTIVE BOX
INTERVAL HPI/OVERNIGHT EVENTS: ***    PRESSORS: [ ] YES [ ] NO  WHICH:    ANTIBIOTICS:                  DATE STARTED:  ANTIBIOTICS:                  DATE STARTED:  ANTIBIOTICS:                  DATE STARTED:    Antimicrobial:    Cardiovascular:  midodrine. 10 milliGRAM(s) Oral three times a day    Pulmonary:  ALBUTerol    90 MICROgram(s) HFA Inhaler 2 Puff(s) Inhalation every 6 hours PRN    Hematalogic:  aspirin  chewable 81 milliGRAM(s) Oral daily  enoxaparin Injectable 40 milliGRAM(s) SubCutaneous daily    Other:  artificial  tears Solution 1 Drop(s) Both EYES every 4 hours PRN  ascorbic acid 1000 milliGRAM(s) Oral daily  chlorhexidine 0.12% Liquid 15 milliLiter(s) Oral Mucosa every 12 hours  chlorhexidine 2% Cloths 1 Application(s) Topical <User Schedule>  dexMEDEtomidine Infusion 1 MICROgram(s)/kG/Hr IV Continuous <Continuous>  diazepam    Tablet 5 milliGRAM(s) Oral every 6 hours  fentaNYL   Infusion 3 MICROgram(s)/kG/Hr IV Continuous <Continuous>  fentaNYL   Patch  25 MICROgram(s)/Hr. 1 Patch Transdermal every 48 hours  insulin lispro (ADMELOG) corrective regimen sliding scale   SubCutaneous every 6 hours  pantoprazole  Injectable 40 milliGRAM(s) IV Push every 12 hours  polyethylene glycol 3350 17 Gram(s) Oral daily  sodium chloride 0.9% lock flush 10 milliLiter(s) IV Push every 1 hour PRN    ALBUTerol    90 MICROgram(s) HFA Inhaler 2 Puff(s) Inhalation every 6 hours PRN  artificial  tears Solution 1 Drop(s) Both EYES every 4 hours PRN  ascorbic acid 1000 milliGRAM(s) Oral daily  aspirin  chewable 81 milliGRAM(s) Oral daily  chlorhexidine 0.12% Liquid 15 milliLiter(s) Oral Mucosa every 12 hours  chlorhexidine 2% Cloths 1 Application(s) Topical <User Schedule>  dexMEDEtomidine Infusion 1 MICROgram(s)/kG/Hr IV Continuous <Continuous>  diazepam    Tablet 5 milliGRAM(s) Oral every 6 hours  enoxaparin Injectable 40 milliGRAM(s) SubCutaneous daily  fentaNYL   Infusion 3 MICROgram(s)/kG/Hr IV Continuous <Continuous>  fentaNYL   Patch  25 MICROgram(s)/Hr. 1 Patch Transdermal every 48 hours  insulin lispro (ADMELOG) corrective regimen sliding scale   SubCutaneous every 6 hours  midodrine. 10 milliGRAM(s) Oral three times a day  pantoprazole  Injectable 40 milliGRAM(s) IV Push every 12 hours  polyethylene glycol 3350 17 Gram(s) Oral daily  sodium chloride 0.9% lock flush 10 milliLiter(s) IV Push every 1 hour PRN    Drug Dosing Weight  Height (cm): 167.6 (23 Apr 2021 23:15)  Weight (kg): 83.9 (23 Apr 2021 23:15)  BMI (kg/m2): 29.9 (23 Apr 2021 23:15)  BSA (m2): 1.93 (23 Apr 2021 23:15)    CENTRAL LINE: [ ] YES [ ] NO  LOCATION:         RIVERA: [ ] YES [ ] NO          A-LINE:  [ ] YES [ ] NO  LOCATION:             ICU Vital Signs Last 24 Hrs  T(C): 37.8 (26 Apr 2021 04:00), Max: 37.8 (26 Apr 2021 04:00)  T(F): 100.1 (26 Apr 2021 04:00), Max: 100.1 (26 Apr 2021 04:00)  HR: 102 (26 Apr 2021 06:00) (78 - 126)  BP: --  BP(mean): --  ABP: 136/76 (26 Apr 2021 06:00) (86/49 - 149/79)  ABP(mean): 101 (26 Apr 2021 06:00) (63 - 103)  RR: 14 (26 Apr 2021 06:00) (14 - 37)  SpO2: 96% (26 Apr 2021 06:00) (92% - 100%)      ABG - ( 26 Apr 2021 03:32 )  pH, Arterial: 7.40  pH, Blood: x     /  pCO2: 69    /  pO2: 71    / HCO3: 43    / Base Excess: 14.5  /  SaO2: N/A                   04-25 @ 07:01  -  04-26 @ 07:00  --------------------------------------------------------  IN: 2070.5 mL / OUT: 870 mL / NET: 1200.5 mL        Mode: AC/ CMV (Assist Control/ Continuous Mandatory Ventilation)  RR (machine): 28  TV (machine): 430  FiO2: 60  PEEP: 3  ITime: 1  MAP: 13  PIP: 42        PHYSICAL EXAM:    GENERAL: NAD  EYES: EOMI, PERRLA  NECK: Supple, No JVD; Normal thyroid; Trachea midline: No LAD   NERVOUS SYSTEM:  Alert & Oriented X3,  Motor Strength 5/5 B/L upper and lower extremities; DTRs 2+ intact and symmetric  CHEST/LUNG: No rales, rhonchi, wheezing, breath sounds present bilaterally  HEART: Regular rate and rhythm; No murmurs, no gallops  ABDOMEN: Soft, Nontender, Nondistended; Bowel sounds present, no pain or masses on palpation  : voiding well, Rivera in place  EXTREMITIES:  2+ Peripheral Pulses, No clubbing, cyanosis, or edema  SKIN: warm, intact, no lesions         LABS:  CBC Full  -  ( 26 Apr 2021 05:42 )  WBC Count : 8.48 K/uL  RBC Count : 1.72 M/uL  Hemoglobin : 6.8 g/dL  Hematocrit : 19.4 %  Platelet Count - Automated : 245 K/uL  Mean Cell Volume : 112.8 fl  Mean Cell Hemoglobin : 39.5 pg  Mean Cell Hemoglobin Concentration : 35.1 gm/dL  Auto Neutrophil # : 5.85 K/uL  Auto Lymphocyte # : 1.70 K/uL  Auto Monocyte # : 0.76 K/uL  Auto Eosinophil # : 0.17 K/uL  Auto Basophil # : 0.00 K/uL  Auto Neutrophil % : 69.0 %  Auto Lymphocyte % : 20.0 %  Auto Monocyte % : 9.0 %  Auto Eosinophil % : 2.0 %  Auto Basophil % : 0.0 %    04-26    144  |  108  |  10  ----------------------------<  141<H>  3.6   |  34<H>  |  0.27<L>    Ca    8.0<L>      26 Apr 2021 05:42  Phos  3.1     04-26  Mg     2.1     04-26    TPro  5.7<L>  /  Alb  1.8<L>  /  TBili  0.4  /  DBili  x   /  AST  19  /  ALT  17  /  AlkPhos  103  04-26            RADIOLOGY & ADDITIONAL STUDIES REVIEWED:  ***    [ ]GOALS OF CARE DISCUSSION WITH PATIENT/FAMILY/PROXY:    CRITICAL CARE TIME SPENT: 35 minutes   INTERVAL HPI/OVERNIGHT EVENTS: patient is sedated, s/p trach on vent and G tube on tube feeds. Will try CPAP today. Patient noted to have hemoglobin drop to 6.8 from 7.5, will transfuse one unit of PRBC and monitor CBC.     PRESSORS: [ ] YES [ x] NO    ANTIBIOTICS:                      Antimicrobial:    Cardiovascular:  midodrine. 10 milliGRAM(s) Oral three times a day    Pulmonary:  ALBUTerol    90 MICROgram(s) HFA Inhaler 2 Puff(s) Inhalation every 6 hours PRN    Hematalogic:  aspirin  chewable 81 milliGRAM(s) Oral daily  enoxaparin Injectable 40 milliGRAM(s) SubCutaneous daily    Other:  artificial  tears Solution 1 Drop(s) Both EYES every 4 hours PRN  ascorbic acid 1000 milliGRAM(s) Oral daily  chlorhexidine 0.12% Liquid 15 milliLiter(s) Oral Mucosa every 12 hours  chlorhexidine 2% Cloths 1 Application(s) Topical <User Schedule>  dexMEDEtomidine Infusion 1 MICROgram(s)/kG/Hr IV Continuous <Continuous>  diazepam    Tablet 5 milliGRAM(s) Oral every 6 hours  fentaNYL   Infusion 3 MICROgram(s)/kG/Hr IV Continuous <Continuous>  fentaNYL   Patch  25 MICROgram(s)/Hr. 1 Patch Transdermal every 48 hours  insulin lispro (ADMELOG) corrective regimen sliding scale   SubCutaneous every 6 hours  pantoprazole  Injectable 40 milliGRAM(s) IV Push every 12 hours  polyethylene glycol 3350 17 Gram(s) Oral daily  sodium chloride 0.9% lock flush 10 milliLiter(s) IV Push every 1 hour PRN    ALBUTerol    90 MICROgram(s) HFA Inhaler 2 Puff(s) Inhalation every 6 hours PRN  artificial  tears Solution 1 Drop(s) Both EYES every 4 hours PRN  ascorbic acid 1000 milliGRAM(s) Oral daily  aspirin  chewable 81 milliGRAM(s) Oral daily  chlorhexidine 0.12% Liquid 15 milliLiter(s) Oral Mucosa every 12 hours  chlorhexidine 2% Cloths 1 Application(s) Topical <User Schedule>  dexMEDEtomidine Infusion 1 MICROgram(s)/kG/Hr IV Continuous <Continuous>  diazepam    Tablet 5 milliGRAM(s) Oral every 6 hours  enoxaparin Injectable 40 milliGRAM(s) SubCutaneous daily  fentaNYL   Infusion 3 MICROgram(s)/kG/Hr IV Continuous <Continuous>  fentaNYL   Patch  25 MICROgram(s)/Hr. 1 Patch Transdermal every 48 hours  insulin lispro (ADMELOG) corrective regimen sliding scale   SubCutaneous every 6 hours  midodrine. 10 milliGRAM(s) Oral three times a day  pantoprazole  Injectable 40 milliGRAM(s) IV Push every 12 hours  polyethylene glycol 3350 17 Gram(s) Oral daily  sodium chloride 0.9% lock flush 10 milliLiter(s) IV Push every 1 hour PRN    Drug Dosing Weight  Height (cm): 167.6 (23 Apr 2021 23:15)  Weight (kg): 83.9 (23 Apr 2021 23:15)  BMI (kg/m2): 29.9 (23 Apr 2021 23:15)  BSA (m2): 1.93 (23 Apr 2021 23:15)    CENTRAL LINE: [ ] YES [ ] NO  LOCATION:         RIVERA: [ ] YES [ ] NO          A-LINE:  [ ] YES [ ] NO  LOCATION:             ICU Vital Signs Last 24 Hrs  T(C): 37.8 (26 Apr 2021 04:00), Max: 37.8 (26 Apr 2021 04:00)  T(F): 100.1 (26 Apr 2021 04:00), Max: 100.1 (26 Apr 2021 04:00)  HR: 102 (26 Apr 2021 06:00) (78 - 126)  BP: --  BP(mean): --  ABP: 136/76 (26 Apr 2021 06:00) (86/49 - 149/79)  ABP(mean): 101 (26 Apr 2021 06:00) (63 - 103)  RR: 14 (26 Apr 2021 06:00) (14 - 37)  SpO2: 96% (26 Apr 2021 06:00) (92% - 100%)      ABG - ( 26 Apr 2021 03:32 )  pH, Arterial: 7.40  pH, Blood: x     /  pCO2: 69    /  pO2: 71    / HCO3: 43    / Base Excess: 14.5  /  SaO2: N/A                   04-25 @ 07:01  -  04-26 @ 07:00  --------------------------------------------------------  IN: 2070.5 mL / OUT: 870 mL / NET: 1200.5 mL        Mode: AC/ CMV (Assist Control/ Continuous Mandatory Ventilation)  RR (machine): 28  TV (machine): 430  FiO2: 60  PEEP: 3  ITime: 1  MAP: 13  PIP: 42        PHYSICAL EXAM:    GENERAL: NAD, sat above 90%  EYES: EOMI, PERRLA  NECK: Supple, No JVD; Normal thyroid; Trachea midline: No LAD, trach    NERVOUS SYSTEM: sedated   CHEST/LUNG: No rales, rhonchi, wheezing, breath sounds present bilaterally  HEART: Regular rate and rhythm; No murmurs, no gallops  ABDOMEN: Soft, Nontender, Nondistended; Bowel sounds present, no pain or masses on palpation, G tube in place- functional, no signs of infection   : condom cath in place  EXTREMITIES:  2+ Peripheral Pulses, No clubbing or cyanosis. Bilateral upper extremity edema  SKIN: warm, intact, no lesions         LABS:  CBC Full  -  ( 26 Apr 2021 05:42 )  WBC Count : 8.48 K/uL  RBC Count : 1.72 M/uL  Hemoglobin : 6.8 g/dL  Hematocrit : 19.4 %  Platelet Count - Automated : 245 K/uL  Mean Cell Volume : 112.8 fl  Mean Cell Hemoglobin : 39.5 pg  Mean Cell Hemoglobin Concentration : 35.1 gm/dL  Auto Neutrophil # : 5.85 K/uL  Auto Lymphocyte # : 1.70 K/uL  Auto Monocyte # : 0.76 K/uL  Auto Eosinophil # : 0.17 K/uL  Auto Basophil # : 0.00 K/uL  Auto Neutrophil % : 69.0 %  Auto Lymphocyte % : 20.0 %  Auto Monocyte % : 9.0 %  Auto Eosinophil % : 2.0 %  Auto Basophil % : 0.0 %    04-26    144  |  108  |  10  ----------------------------<  141<H>  3.6   |  34<H>  |  0.27<L>    Ca    8.0<L>      26 Apr 2021 05:42  Phos  3.1     04-26  Mg     2.1     04-26    TPro  5.7<L>  /  Alb  1.8<L>  /  TBili  0.4  /  DBili  x   /  AST  19  /  ALT  17  /  AlkPhos  103  04-26        RADIOLOGY & ADDITIONAL STUDIES REVIEWED:  ***    [ ]GOALS OF CARE DISCUSSION WITH PATIENT/FAMILY/PROXY:    CRITICAL CARE TIME SPENT: 35 minutes   INTERVAL HPI/OVERNIGHT EVENTS: patient is sedated, s/p trach on vent and G tube on tube feeds. Will try CPAP today. Patient noted to have hemoglobin drop to 6.8 from 7.5, will transfuse one unit of PRBC and monitor CBC.     PRESSORS: [ ] YES [ x] NO    ANTIBIOTICS:                      Antimicrobial:    Cardiovascular:  midodrine. 10 milliGRAM(s) Oral three times a day    Pulmonary:  ALBUTerol    90 MICROgram(s) HFA Inhaler 2 Puff(s) Inhalation every 6 hours PRN    Hematalogic:  aspirin  chewable 81 milliGRAM(s) Oral daily  enoxaparin Injectable 40 milliGRAM(s) SubCutaneous daily    Other:  artificial  tears Solution 1 Drop(s) Both EYES every 4 hours PRN  ascorbic acid 1000 milliGRAM(s) Oral daily  chlorhexidine 0.12% Liquid 15 milliLiter(s) Oral Mucosa every 12 hours  chlorhexidine 2% Cloths 1 Application(s) Topical <User Schedule>  dexMEDEtomidine Infusion 1 MICROgram(s)/kG/Hr IV Continuous <Continuous>  diazepam    Tablet 5 milliGRAM(s) Oral every 6 hours  fentaNYL   Infusion 3 MICROgram(s)/kG/Hr IV Continuous <Continuous>  fentaNYL   Patch  25 MICROgram(s)/Hr. 1 Patch Transdermal every 48 hours  insulin lispro (ADMELOG) corrective regimen sliding scale   SubCutaneous every 6 hours  pantoprazole  Injectable 40 milliGRAM(s) IV Push every 12 hours  polyethylene glycol 3350 17 Gram(s) Oral daily  sodium chloride 0.9% lock flush 10 milliLiter(s) IV Push every 1 hour PRN    ALBUTerol    90 MICROgram(s) HFA Inhaler 2 Puff(s) Inhalation every 6 hours PRN  artificial  tears Solution 1 Drop(s) Both EYES every 4 hours PRN  ascorbic acid 1000 milliGRAM(s) Oral daily  aspirin  chewable 81 milliGRAM(s) Oral daily  chlorhexidine 0.12% Liquid 15 milliLiter(s) Oral Mucosa every 12 hours  chlorhexidine 2% Cloths 1 Application(s) Topical <User Schedule>  dexMEDEtomidine Infusion 1 MICROgram(s)/kG/Hr IV Continuous <Continuous>  diazepam    Tablet 5 milliGRAM(s) Oral every 6 hours  enoxaparin Injectable 40 milliGRAM(s) SubCutaneous daily  fentaNYL   Infusion 3 MICROgram(s)/kG/Hr IV Continuous <Continuous>  fentaNYL   Patch  25 MICROgram(s)/Hr. 1 Patch Transdermal every 48 hours  insulin lispro (ADMELOG) corrective regimen sliding scale   SubCutaneous every 6 hours  midodrine. 10 milliGRAM(s) Oral three times a day  pantoprazole  Injectable 40 milliGRAM(s) IV Push every 12 hours  polyethylene glycol 3350 17 Gram(s) Oral daily  sodium chloride 0.9% lock flush 10 milliLiter(s) IV Push every 1 hour PRN    Drug Dosing Weight  Height (cm): 167.6 (23 Apr 2021 23:15)  Weight (kg): 83.9 (23 Apr 2021 23:15)  BMI (kg/m2): 29.9 (23 Apr 2021 23:15)  BSA (m2): 1.93 (23 Apr 2021 23:15)    CENTRAL LINE: [ ] YES [ ] NO  LOCATION:         RIVERA: [ ] YES [ ] NO          A-LINE:  [ ] YES [ ] NO  LOCATION:             ICU Vital Signs Last 24 Hrs  T(C): 37.8 (26 Apr 2021 04:00), Max: 37.8 (26 Apr 2021 04:00)  T(F): 100.1 (26 Apr 2021 04:00), Max: 100.1 (26 Apr 2021 04:00)  HR: 102 (26 Apr 2021 06:00) (78 - 126)  BP: --  BP(mean): --  ABP: 136/76 (26 Apr 2021 06:00) (86/49 - 149/79)  ABP(mean): 101 (26 Apr 2021 06:00) (63 - 103)  RR: 14 (26 Apr 2021 06:00) (14 - 37)  SpO2: 96% (26 Apr 2021 06:00) (92% - 100%)      ABG - ( 26 Apr 2021 03:32 )  pH, Arterial: 7.40  pH, Blood: x     /  pCO2: 69    /  pO2: 71    / HCO3: 43    / Base Excess: 14.5  /  SaO2: N/A                   04-25 @ 07:01  -  04-26 @ 07:00  --------------------------------------------------------  IN: 2070.5 mL / OUT: 870 mL / NET: 1200.5 mL        Mode: AC/ CMV (Assist Control/ Continuous Mandatory Ventilation)  RR (machine): 28  TV (machine): 430  FiO2: 60  PEEP: 3  ITime: 1  MAP: 13  PIP: 42        PHYSICAL EXAM:    GENERAL: NAD, sat above 90%  EYES: EOMI, PERRLA  NECK: Supple, No JVD; Normal thyroid; Trachea midline: No LAD, trach    NERVOUS SYSTEM: sedated   CHEST/LUNG: No rales, rhonchi, wheezing, breath sounds present bilaterally  HEART: Regular rate and rhythm; No murmurs, no gallops  ABDOMEN: Soft, Nontender, Nondistended; Bowel sounds present, no pain or masses on palpation, G tube in place- functional, no signs of infection   : condom cath in place  EXTREMITIES:  2+ Peripheral Pulses, No clubbing or cyanosis. Bilateral upper extremity pitting edema  SKIN: warm, intact, no lesions         LABS:  CBC Full  -  ( 26 Apr 2021 05:42 )  WBC Count : 8.48 K/uL  RBC Count : 1.72 M/uL  Hemoglobin : 6.8 g/dL  Hematocrit : 19.4 %  Platelet Count - Automated : 245 K/uL  Mean Cell Volume : 112.8 fl  Mean Cell Hemoglobin : 39.5 pg  Mean Cell Hemoglobin Concentration : 35.1 gm/dL  Auto Neutrophil # : 5.85 K/uL  Auto Lymphocyte # : 1.70 K/uL  Auto Monocyte # : 0.76 K/uL  Auto Eosinophil # : 0.17 K/uL  Auto Basophil # : 0.00 K/uL  Auto Neutrophil % : 69.0 %  Auto Lymphocyte % : 20.0 %  Auto Monocyte % : 9.0 %  Auto Eosinophil % : 2.0 %  Auto Basophil % : 0.0 %    04-26    144  |  108  |  10  ----------------------------<  141<H>  3.6   |  34<H>  |  0.27<L>    Ca    8.0<L>      26 Apr 2021 05:42  Phos  3.1     04-26  Mg     2.1     04-26    TPro  5.7<L>  /  Alb  1.8<L>  /  TBili  0.4  /  DBili  x   /  AST  19  /  ALT  17  /  AlkPhos  103  04-26        RADIOLOGY & ADDITIONAL STUDIES REVIEWED:  ***    [ ]GOALS OF CARE DISCUSSION WITH PATIENT/FAMILY/PROXY:    CRITICAL CARE TIME SPENT: 35 minutes

## 2021-04-26 NOTE — CHART NOTE - NSCHARTNOTEFT_GEN_A_CORE
Spoke to brothkai Amin regarding patient's current medical management and obtained consent for blood transfusion. Questions answered.

## 2021-04-26 NOTE — PROGRESS NOTE ADULT - ATTENDING COMMENTS
55 yr old  man , non smoker with  moody 1990s presented 3/14 with x9 days worsening cough, subjective fevers, and SOB, with x2-3 days dysuria and central, non-radiating, constant CP. Admitted to medicine unit  for acute hypoxic respiratory failure secondary to pna from covid-19 infection .     Assessment:  1. Acute hypoxic respiratory failure  2. Covid-19 infection   3. Transaminitis  4. Prediabetes  5. Bilateral pneumothorax  6. Septic shock     Plan   -S/p tracheostomy placement 4/21   - S/p G-tube 4/23  -Continue tube feedings  -Completed antibiotics  -thoracic f/u noted   -adjust vent as per ABG, repeat ABG improved  -permissive hypercapnia   -Taper off precedex at this time  -Recurrent PTX on the left, place chest tube to suction  -Taper off fentanyl infusion as now started on fentanyl patch  -Cont. midodrine  -monitor biomarkers daily, trending down   -Transfuse 1 unit PRBC and monitor for signs of bleeding  -dvt/gi prophy  -hemodynamic monitoring   -Prognosis is guarded

## 2021-04-26 NOTE — PROGRESS NOTE ADULT - SUBJECTIVE AND OBJECTIVE BOX
Patient is a 55y old  Male who presents with a chief complaint of SOB (26 Apr 2021 07:12)  Patient remains on ventilator via trach. Sedated, s/p peg as well    INTERVAL HPI/OVERNIGHT EVENTS:      VITAL SIGNS:  T(F): 98.8 (04-26-21 @ 08:00)  HR: 90 (04-26-21 @ 10:00)  BP: --  RR: 22 (04-26-21 @ 10:00)  SpO2: 98% (04-26-21 @ 10:00)  Wt(kg): --  I&O's Detail    25 Apr 2021 07:01  -  26 Apr 2021 07:00  --------------------------------------------------------  IN:    Dexmedetomidine: 325.5 mL    Enteral Tube Flush: 300 mL    FentaNYL: 532 mL    Jevity 1.5: 870 mL    Midazolam: 85 mL  Total IN: 2112.5 mL    OUT:    Chest Tube (mL): 100 mL    Incontinent per Condom Catheter (mL): 770 mL  Total OUT: 870 mL    Total NET: 1242.5 mL      26 Apr 2021 07:01  -  26 Apr 2021 10:57  --------------------------------------------------------  IN:    Dexmedetomidine: 21 mL    FentaNYL: 21 mL  Total IN: 42 mL    OUT:  Total OUT: 0 mL    Total NET: 42 mL        Mode: AC/ CMV (Assist Control/ Continuous Mandatory Ventilation)  RR (machine): 28  TV (machine): 430  FiO2: 60  PEEP: 3  ITime: 0.7  MAP: 12  PIP: 45        REVIEW OF SYSTEMS:    CONSTITUTIONAL:  No fevers, chills, sweats    HEENT:  Eyes:  No diplopia or blurred vision. ENT:  No earache, sore throat or runny nose.    CARDIOVASCULAR:  No pressure, squeezing, tightness, or heaviness about the chest; no palpitations.    RESPIRATORY:  Per HPI    GASTROINTESTINAL:  No abdominal pain, nausea, vomiting or diarrhea.    GENITOURINARY:  No dysuria, frequency or urgency.    NEUROLOGIC:  No paresthesias, fasciculations, seizures or weakness.    PSYCHIATRIC:  No disorder of thought or mood.      PHYSICAL EXAM:    Constitutional: Well developed and nourished  Eyes:Perrla  ENMT: normal  Neck:supple  Respiratory: good air entry  Cardiovascular: S1 S2 regular  Gastrointestinal: Soft, Non tender  Extremities: No edema  Vascular:normal  Neurological: Sedated  Musculoskeletal:Normal      MEDICATIONS  (STANDING):  ascorbic acid 1000 milliGRAM(s) Oral daily  aspirin  chewable 81 milliGRAM(s) Oral daily  chlorhexidine 0.12% Liquid 15 milliLiter(s) Oral Mucosa every 12 hours  chlorhexidine 2% Cloths 1 Application(s) Topical <User Schedule>  dexMEDEtomidine Infusion 1 MICROgram(s)/kG/Hr (21 mL/Hr) IV Continuous <Continuous>  diazepam    Tablet 5 milliGRAM(s) Oral every 6 hours  enoxaparin Injectable 40 milliGRAM(s) SubCutaneous daily  fentaNYL   Infusion 1 MICROgram(s)/kG/Hr (8.39 mL/Hr) IV Continuous <Continuous>  fentaNYL   Patch  25 MICROgram(s)/Hr. 1 Patch Transdermal every 48 hours  insulin lispro (ADMELOG) corrective regimen sliding scale   SubCutaneous every 6 hours  midodrine. 10 milliGRAM(s) Oral three times a day  pantoprazole  Injectable 40 milliGRAM(s) IV Push every 12 hours  polyethylene glycol 3350 17 Gram(s) Oral daily    MEDICATIONS  (PRN):  ALBUTerol    90 MICROgram(s) HFA Inhaler 2 Puff(s) Inhalation every 6 hours PRN Shortness of Breath and/or Wheezing  artificial  tears Solution 1 Drop(s) Both EYES every 4 hours PRN Dry Eyes  sodium chloride 0.9% lock flush 10 milliLiter(s) IV Push every 1 hour PRN Pre/post blood products, medications, blood draw, and to maintain line patency      Allergies    No Known Allergies    Intolerances        LABS:                        6.8    8.48  )-----------( 245      ( 26 Apr 2021 05:42 )             19.4     04-26    144  |  108  |  10  ----------------------------<  141<H>  3.6   |  34<H>  |  0.27<L>    Ca    8.0<L>      26 Apr 2021 05:42  Phos  3.1     04-26  Mg     2.1     04-26    TPro  5.7<L>  /  Alb  1.8<L>  /  TBili  0.4  /  DBili  x   /  AST  19  /  ALT  17  /  AlkPhos  103  04-26        ABG - ( 26 Apr 2021 03:32 )  pH, Arterial: 7.40  pH, Blood: x     /  pCO2: 69    /  pO2: 71    / HCO3: 43    / Base Excess: 14.5  /  SaO2: N/A                   CAPILLARY BLOOD GLUCOSE      POCT Blood Glucose.: 149 mg/dL (26 Apr 2021 05:10)  POCT Blood Glucose.: 158 mg/dL (25 Apr 2021 23:36)  POCT Blood Glucose.: 135 mg/dL (25 Apr 2021 17:22)  POCT Blood Glucose.: 116 mg/dL (25 Apr 2021 12:13)    pro-bnp -- 04-26 @ 05:42     d-dimer 1434  04-26 @ 05:42  pro-bnp -- 04-23 @ 06:21     d-dimer 1097  04-23 @ 06:21  pro-bnp -- 04-22 @ 03:55     d-dimer 1367  04-22 @ 03:55  pro-bnp -- 04-21 @ 03:58     d-dimer 2160  04-21 @ 03:58  pro-bnp -- 04-20 @ 03:50     d-dimer 2911  04-20 @ 03:50      RADIOLOGY & ADDITIONAL TESTS:    CXR:  < from: Xray Chest 1 View- PORTABLE-Routine (Xray Chest 1 View- PORTABLE-Routine in AM.) (04.25.21 @ 09:25) >  IMPRESSION:   A 40% LEFT pneumothorax.  LEFT multi-sidehole pigtail catheter overlies LEFT lower hemithorax..  Bilateral multifocal and diffuse ill-defined airspace opacities..    Follow-up AP portable chest radiograph 4/25/2021 AT 8:58 AM:  Residual LEFT 30% upper zone pneumothorax.  Otherwise no interval change...          Ct scan chest:    ekg;    echo:

## 2021-04-26 NOTE — PROGRESS NOTE ADULT - ASSESSMENT
Assessment and plan: 54 y/o M with no PMH is admitted to ICU for AHRF 2/2 covid19 pna       1. Acute hypoxic respiratory failure  2. ARDS 2/2 Covid pneumonia  3. Transaminitis  4. Prediabetes  5. Abnormal TSH    =================== Neuro============================  -Alert and oriented x 3 at baseline   -Intubated and sedated 3/29 on Vent  -Trach 4/22 continues on Vent    -Continue w/ fentanyl and Precedex   -C/w fentanyl patch   -Off propofol  -CT head unremarkable     ================= Cardiovascular==========================  # Hypotension      C/w midodrine 10 mg q8hrs   Off pressors     # TACHYCARDIA: recurrent episodes   -Lopressor PRN pushes   -HR in 110's  -Continue monitoring     ================- Pulm=================================  #Acute hypoxic respiratory failure: secondary to Covid19 pneumonia  #ARDS  -Was on HFNC then got intubated 3/29  -D-dimer initially elevated on presentation, trending down    -s/p Nimbex and Epifanio at different occasions for vent synchrony  -s/p Pigtail on left chest , removed on 4/15  -Large Pneumothorax was noted on 4/18 on left side, s/p chest tube placement, still has pneumothorax     -Remdesivir was discontinued due to positive antibodies   - Finished Dexamethasone   - Prednisone taper Finished  - Covid19 PCR negative now, off isolation       # Bacterial Pneumonia  - stable infiltrate on CXR ,likely developed VAP, Finished ABx Zosyn, meropenem, s/p 1 dose of Vancomycin  - MRSA negative from 3/26  - Sputum Cx growing few gram negative rods, CRE  - Pulm. Dr. Ryan  - ID Dr Anand       #Pneumothorax and pneumomediastinum:   -X-ray chest: remains with left pneumothorax  -Thoracic surgery following  -s/p Chest tube placed 4/8 pigtail to wall suction discontinued on 4/15  -On 4/18 chest tube was placed back on left lung to treat pneumothorax  -CXR 4/26 still shows left lung pneumothorax        ==================ID===================================  Likely bacterial pneumonia   -leukocytosis resolved  -No more fever, On Tylenol PRN only   -Finished Meropenem  -plan as above     ================= Nephro================================  -Pitting edema to both lower arms  -on condom cath   -monitor I/Os , good urine output overall  -s/p Albumin x 2 days on 4/10  -Lasix 40mg BID with Albumin 25% Q6 for 48 hours to help with urine output and fluid status.( 4/15) Albumin finished , was D/jennifer on 4/19, urine output decreasing  - Patient can get Lasix as needed   - Metabolic Alkalosis likely due to compensation, improving post Diamox     =================GI====================================  Transaminitis:   likely secondary to Covid19   - and  on presentation   hepatitis panel -ve  -Improved, now normalized   -continue to monitor LFT    Diet:   NGT (3/29) with tube feed  Bowel regimen   G tube placed on feeds        ================ Heme==================================  Elevated d-dimer: likely secondary to Covid19   -D-dimer 423 on admission  -Last D-dimer 1434  -continue prophylactic Lovenox 40 mg daily    Anemia  Hgb 8.8 dropped to 6.8  Will transfuse 1 unit of PRBC   No active signs of bleeding      =================Endocrine===============================  Prediabetes:    -A1c 5.8  -BS controlled  -continue HSS  -monitor FS while on steroids    Abnormal TSH:  -TSH level noted 0.26,   -repeat TSH 0.37 and Free T4 1.82    ================= Skin/Catheters============================  No rashes. Peripheral IV lines.   RIJ 4/13  RRA 3/29, Removed  RBA 4/20.    - =================Prophylaxis =============================  Lovenox for DVT proph : Lovenox 40mg SQ daily  Protonix for  GI proph    ==================GOC==================================   FULL CODE Assessment and plan: 56 y/o M with no PMH is admitted to ICU for AHRF 2/2 covid19 pna     1. Acute hypoxic respiratory failure  2. ARDS 2/2 Covid pneumonia  3. Transaminitis  4. Prediabetes  5. Abnormal TSH    =================== Neuro============================  -Alert and oriented x 3 at baseline   -Intubated and sedated 3/29 on Vent  -Trach 4/22 continues on Vent    -Continue w/ fentanyl and Precedex   -C/w fentanyl patch   -Off propofol  -CT head unremarkable     ================= Cardiovascular==========================  # Hypotension   C/w midodrine 10 mg q8hrs   Off pressors     # TACHYCARDIA: recurrent episodes   -Lopressor PRN pushes   -HR in 110's  -Continue monitoring     ================- Pulm=================================  #Acute hypoxic respiratory failure: secondary to Covid19 pneumonia  #ARDS  -Was on HFNC then got intubated 3/29  -D-dimer initially elevated on presentation, trending down    -s/p Nimbex and Epifanio at different occasions for vent synchrony  -s/p Pigtail on left chest , removed on 4/15  -Large Pneumothorax was noted on 4/18 on left side, s/p chest tube placement, still has pneumothorax     -Remdesivir was discontinued due to positive antibodies   - Finished Dexamethasone   - Prednisone taper Finished  - Covid19 PCR negative now, off isolation       # Bacterial Pneumonia  - stable infiltrate on CXR ,likely developed VAP, Finished ABx Zosyn, meropenem, s/p 1 dose of Vancomycin  - MRSA negative from 3/26  - Sputum Cx growing few gram negative rods, CRE  - Pulm. Dr. Ryan  - ID Dr Anand       #Pneumothorax and pneumomediastinum:   -X-ray chest: remains with left pneumothorax  -Thoracic surgery following  -s/p Chest tube placed 4/8 pigtail to wall suction discontinued on 4/15  -On 4/18 chest tube was placed back on left lung to treat pneumothorax  -CXR 4/26 still shows left lung pneumothorax        ==================ID===================================  Likely bacterial pneumonia   -leukocytosis resolved  -No more fever, On Tylenol PRN only   -Finished Meropenem  -plan as above     ================= Nephro================================  -Pitting edema to both lower arms  -on condom cath   -monitor I/Os , good urine output overall  -s/p Albumin x 2 days on 4/10  -Lasix 40mg BID with Albumin 25% Q6 for 48 hours to help with urine output and fluid status.( 4/15) Albumin finished , was D/jennifer on 4/19, urine output decreasing  - Patient can get Lasix as needed   - Metabolic Alkalosis likely due to compensation, improving post Diamox     =================GI====================================  Transaminitis:   likely secondary to Covid19   - and  on presentation   hepatitis panel -ve  -Improved, now normalized   -continue to monitor LFT    Diet:   S/p NGT (3/29) with tube feed  Bowel regimen   G tube placed on feeds        ================ Heme==================================  Elevated d-dimer: likely secondary to Covid19   -D-dimer 423 on admission  -Last D-dimer 1434  -continue prophylactic Lovenox 40 mg daily    Anemia  Hgb 8.8 dropped to 6.8  Will transfuse 1 unit of PRBC   No active signs of bleeding  CBC post transfusion then q 12 hrs       =================Endocrine===============================  Prediabetes:  -A1c 5.8  -BS controlled  -continue HSS  -monitor FS while on steroids    Abnormal TSH:  -TSH level noted 0.26,   -repeat TSH 0.37 and Free T4 1.82    ================= Skin/Catheters============================  No rashes. Peripheral IV lines.     - =================Prophylaxis =============================  Lovenox for DVT proph : Lovenox 40mg SQ daily  Protonix for  GI proph    ==================GOC==================================   FULL CODE Assessment and plan: 56 y/o M with no PMH is admitted to ICU for AHRF 2/2 covid19 pna     1. Acute hypoxic respiratory failure  2. ARDS 2/2 Covid pneumonia  3. Transaminitis  4. Prediabetes  5. Abnormal TSH    =================== Neuro============================  -Alert and oriented x 3 at baseline   -Intubated and sedated 3/29 on Vent  -Trach 4/22 continues on Vent    -Continue w/ fentanyl and Precedex   -C/w fentanyl patch   -Off propofol  -CT head unremarkable     ================= Cardiovascular==========================  # Hypotension   C/w midodrine 10 mg q8hrs   Off pressors     # TACHYCARDIA: recurrent episodes   -Lopressor PRN pushes   -HR in 110's  -Continue monitoring     ================- Pulm=================================  #Acute hypoxic respiratory failure: secondary to Covid19 pneumonia  #ARDS  -Was on HFNC then got intubated 3/29  -D-dimer initially elevated on presentation, trending down    -s/p Nimbex and Epifanio at different occasions for vent synchrony  -s/p Pigtail on left chest , removed on 4/15  -Large Pneumothorax was noted on 4/18 on left side, s/p chest tube placement, still has pneumothorax   -Trach 4/22 continues on Vent   -Remdesivir was discontinued due to positive antibodies   - Finished Dexamethasone   - Prednisone taper Finished  - Covid19 PCR negative now, off isolation       # Bacterial Pneumonia  - stable infiltrate on CXR ,likely developed VAP, Finished ABx Zosyn, meropenem, s/p 1 dose of Vancomycin  - MRSA negative from 3/26  - Sputum Cx growing few gram negative rods, CRE  - Pulm. Dr. Ryan  - ID Dr Anand       #Pneumothorax and pneumomediastinum:   -X-ray chest: remains with left pneumothorax  -Thoracic surgery following  -s/p Chest tube placed 4/8 pigtail to wall suction discontinued on 4/15  -On 4/18 chest tube was placed back on left lung to treat pneumothorax  -CXR 4/26 still shows left lung pneumothorax        ==================ID===================================  Likely bacterial pneumonia   -leukocytosis resolved  -No more fever, On Tylenol PRN only   -Finished Meropenem  -plan as above     ================= Nephro================================  -Pitting edema to both lower arms  -on condom cath   -monitor I/Os , good urine output overall  -s/p Albumin x 2 days on 4/10  -Lasix 40mg BID with Albumin 25% Q6 for 48 hours to help with urine output and fluid status.( 4/15) Albumin finished , was D/jennifer on 4/19, urine output decreasing  - Patient can get Lasix as needed   - Metabolic Alkalosis likely due to compensation, improving post Diamox     =================GI====================================  Transaminitis:   likely secondary to Covid19   - and  on presentation   hepatitis panel -ve  -Improved, now normalized   -continue to monitor LFT    Diet:   S/p NGT (3/29) with tube feed  Bowel regimen   G tube 4/23, on feeds          ================ Heme==================================  Elevated d-dimer: likely secondary to Covid19   -D-dimer 423 on admission  -Last D-dimer 1434  -continue prophylactic Lovenox 40 mg daily    Anemia  Hgb 8.8 dropped to 6.8  Will transfuse 1 unit of PRBC   No active signs of bleeding  CBC post transfusion then q 12 hrs       =================Endocrine===============================  Prediabetes:  -A1c 5.8  -BS controlled  -continue HSS  -monitor FS while on steroids    Abnormal TSH:  -TSH level noted 0.26,   -repeat TSH 0.37 and Free T4 1.82    ================= Skin/Catheters============================  No rashes. Peripheral IV lines.     - =================Prophylaxis =============================  Lovenox for DVT proph : Lovenox 40mg SQ daily  Protonix for  GI proph    ==================GOC==================================   FULL CODE Assessment and plan: 56 y/o M with no PMH is admitted to ICU for AHRF 2/2 covid19 pna     1. Acute hypoxic respiratory failure  2. ARDS 2/2 Covid pneumonia  3. Transaminitis  4. Prediabetes  5. Abnormal TSH    =================== Neuro============================  -Alert and oriented x 3 at baseline   -Intubated and sedated 3/29 on Vent  -Trach 4/22 continues on Vent    -Continue w/ fentanyl and Precedex   -C/w fentanyl patch   -Off propofol  -CT head unremarkable     ================= Cardiovascular==========================  # Hypotension   C/w midodrine 10 mg q8hrs   Off pressors     # TACHYCARDIA: recurrent episodes   -Lopressor PRN pushes   -HR in 110's  -Continue monitoring     ================- Pulm=================================  #Acute hypoxic respiratory failure: secondary to Covid19 pneumonia  #ARDS  -Was on HFNC then got intubated 3/29  -D-dimer initially elevated on presentation, trending down    -s/p Nimbex and Epifanio at different occasions for vent synchrony  -s/p Pigtail on left chest , removed on 4/15  -Large Pneumothorax was noted on 4/18 on left side, s/p chest tube placement, still has pneumothorax   -Trach 4/22 continues on Vent   -Remdesivir was discontinued due to positive antibodies   - Finished Dexamethasone   - Prednisone taper Finished  - Covid19 PCR negative now, off isolation       # Bacterial Pneumonia  - stable infiltrate on CXR ,likely developed VAP, Finished ABx Zosyn, meropenem, s/p 1 dose of Vancomycin  - MRSA negative from 3/26  - Sputum Cx growing few gram negative rods, CRE  - Pulm. Dr. Ryan  - ID Dr Anand       #Pneumothorax and pneumomediastinum:   -X-ray chest: remains with left pneumothorax  -Thoracic surgery following  -s/p Chest tube placed 4/8 pigtail to wall suction discontinued on 4/15  -On 4/18 chest tube was placed back on left lung to treat pneumothorax  -CXR 4/26 still shows left lung pneumothorax        ==================ID===================================  Likely bacterial pneumonia   -leukocytosis resolved  -No more fever, On Tylenol PRN only   -Finished Meropenem  -plan as above     ================= Nephro================================  -Pitting edema to both lower arms  -on condom cath   -monitor I/Os , good urine output overall  -s/p Albumin x 2 days on 4/10  -Lasix 40mg BID with Albumin 25% Q6 for 48 hours to help with urine output and fluid status.( 4/15) Albumin finished , was D/jennifer on 4/19, urine output decreasing  - Patient can get Lasix as needed   - Metabolic Alkalosis likely due to compensation, improving post Diamox     =================GI====================================  Transaminitis:   likely secondary to Covid19   - and  on presentation   hepatitis panel -ve  -Improved, now normalized   -continue to monitor LFT    Diet:   S/p NGT (3/29) with tube feed  Bowel regimen   G tube 4/23, on feeds          ================ Heme==================================  Elevated d-dimer: likely secondary to Covid19   -D-dimer 423 on admission  -Last D-dimer 1434  -continue prophylactic Lovenox 40 mg daily    Anemia  Hgb 8.8 dropped to 6.8  Will transfuse 1 unit of PRBC   No active signs of bleeding  CBC post transfusion then q 12 hrs       =================Endocrine===============================  Prediabetes:  -A1c 5.8  -BS controlled  -continue HSS  -monitor FS while on steroids    Abnormal TSH:  -TSH level noted 0.26,   -repeat TSH 0.37 and Free T4 1.82    ================= Skin/Catheters============================  No rashes. Peripheral IV lines.   Bilateral upper extremity pitting edema     - =================Prophylaxis =============================  Lovenox for DVT proph : Lovenox 40mg SQ daily  Protonix for  GI proph    ==================GOC==================================   FULL CODE

## 2021-04-27 LAB
ALBUMIN SERPL ELPH-MCNC: 1.7 G/DL — LOW (ref 3.5–5)
ALBUMIN SERPL ELPH-MCNC: 1.8 G/DL — LOW (ref 3.5–5)
ALP SERPL-CCNC: 89 U/L — SIGNIFICANT CHANGE UP (ref 40–120)
ALP SERPL-CCNC: 96 U/L — SIGNIFICANT CHANGE UP (ref 40–120)
ALT FLD-CCNC: 19 U/L DA — SIGNIFICANT CHANGE UP (ref 10–60)
ALT FLD-CCNC: 20 U/L DA — SIGNIFICANT CHANGE UP (ref 10–60)
ANION GAP SERPL CALC-SCNC: 0 MMOL/L — LOW (ref 5–17)
ANION GAP SERPL CALC-SCNC: 1 MMOL/L — LOW (ref 5–17)
AST SERPL-CCNC: 21 U/L — SIGNIFICANT CHANGE UP (ref 10–40)
AST SERPL-CCNC: 25 U/L — SIGNIFICANT CHANGE UP (ref 10–40)
BASE EXCESS BLDA CALC-SCNC: 18.9 MMOL/L — HIGH (ref -2–3)
BASOPHILS # BLD AUTO: 0.03 K/UL — SIGNIFICANT CHANGE UP (ref 0–0.2)
BASOPHILS NFR BLD AUTO: 0.3 % — SIGNIFICANT CHANGE UP (ref 0–2)
BILIRUB SERPL-MCNC: 0.3 MG/DL — SIGNIFICANT CHANGE UP (ref 0.2–1.2)
BILIRUB SERPL-MCNC: 0.4 MG/DL — SIGNIFICANT CHANGE UP (ref 0.2–1.2)
BLOOD GAS COMMENTS ARTERIAL: SIGNIFICANT CHANGE UP
BUN SERPL-MCNC: 10 MG/DL — SIGNIFICANT CHANGE UP (ref 7–18)
BUN SERPL-MCNC: 8 MG/DL — SIGNIFICANT CHANGE UP (ref 7–18)
CALCIUM SERPL-MCNC: 7.9 MG/DL — LOW (ref 8.4–10.5)
CALCIUM SERPL-MCNC: 8.3 MG/DL — LOW (ref 8.4–10.5)
CHLORIDE SERPL-SCNC: 108 MMOL/L — SIGNIFICANT CHANGE UP (ref 96–108)
CHLORIDE SERPL-SCNC: 108 MMOL/L — SIGNIFICANT CHANGE UP (ref 96–108)
CO2 SERPL-SCNC: 36 MMOL/L — HIGH (ref 22–31)
CO2 SERPL-SCNC: 37 MMOL/L — HIGH (ref 22–31)
CREAT SERPL-MCNC: <0.2 MG/DL — LOW (ref 0.5–1.3)
CREAT SERPL-MCNC: <0.2 MG/DL — LOW (ref 0.5–1.3)
EOSINOPHIL # BLD AUTO: 0.03 K/UL — SIGNIFICANT CHANGE UP (ref 0–0.5)
EOSINOPHIL NFR BLD AUTO: 0.3 % — SIGNIFICANT CHANGE UP (ref 0–6)
GLUCOSE BLDC GLUCOMTR-MCNC: 103 MG/DL — HIGH (ref 70–99)
GLUCOSE BLDC GLUCOMTR-MCNC: 104 MG/DL — HIGH (ref 70–99)
GLUCOSE BLDC GLUCOMTR-MCNC: 106 MG/DL — HIGH (ref 70–99)
GLUCOSE BLDC GLUCOMTR-MCNC: 159 MG/DL — HIGH (ref 70–99)
GLUCOSE BLDC GLUCOMTR-MCNC: 171 MG/DL — HIGH (ref 70–99)
GLUCOSE BLDC GLUCOMTR-MCNC: 89 MG/DL — SIGNIFICANT CHANGE UP (ref 70–99)
GLUCOSE BLDC GLUCOMTR-MCNC: 89 MG/DL — SIGNIFICANT CHANGE UP (ref 70–99)
GLUCOSE BLDC GLUCOMTR-MCNC: 91 MG/DL — SIGNIFICANT CHANGE UP (ref 70–99)
GLUCOSE BLDC GLUCOMTR-MCNC: 97 MG/DL — SIGNIFICANT CHANGE UP (ref 70–99)
GLUCOSE BLDC GLUCOMTR-MCNC: 99 MG/DL — SIGNIFICANT CHANGE UP (ref 70–99)
GLUCOSE BLDC GLUCOMTR-MCNC: 99 MG/DL — SIGNIFICANT CHANGE UP (ref 70–99)
GLUCOSE SERPL-MCNC: 103 MG/DL — HIGH (ref 70–99)
GLUCOSE SERPL-MCNC: 148 MG/DL — HIGH (ref 70–99)
HCO3 BLDA-SCNC: 46 MMOL/L — CRITICAL HIGH (ref 21–28)
HCT VFR BLD CALC: 20.6 % — CRITICAL LOW (ref 39–50)
HCT VFR BLD CALC: 20.7 % — CRITICAL LOW (ref 39–50)
HGB BLD-MCNC: 7 G/DL — CRITICAL LOW (ref 13–17)
HGB BLD-MCNC: 7.1 G/DL — LOW (ref 13–17)
HOROWITZ INDEX BLDA+IHG-RTO: 60 — SIGNIFICANT CHANGE UP
IMM GRANULOCYTES NFR BLD AUTO: 3.8 % — HIGH (ref 0–1.5)
LYMPHOCYTES # BLD AUTO: 1.32 K/UL — SIGNIFICANT CHANGE UP (ref 1–3.3)
LYMPHOCYTES # BLD AUTO: 13.9 % — SIGNIFICANT CHANGE UP (ref 13–44)
MAGNESIUM SERPL-MCNC: 1.8 MG/DL — SIGNIFICANT CHANGE UP (ref 1.6–2.6)
MAGNESIUM SERPL-MCNC: 2 MG/DL — SIGNIFICANT CHANGE UP (ref 1.6–2.6)
MCHC RBC-ENTMCNC: 34 GM/DL — SIGNIFICANT CHANGE UP (ref 32–36)
MCHC RBC-ENTMCNC: 34.3 GM/DL — SIGNIFICANT CHANGE UP (ref 32–36)
MCHC RBC-ENTMCNC: 37.2 PG — HIGH (ref 27–34)
MCHC RBC-ENTMCNC: 37.8 PG — HIGH (ref 27–34)
MCV RBC AUTO: 109.6 FL — HIGH (ref 80–100)
MCV RBC AUTO: 110.1 FL — HIGH (ref 80–100)
MONOCYTES # BLD AUTO: 0.72 K/UL — SIGNIFICANT CHANGE UP (ref 0–0.9)
MONOCYTES NFR BLD AUTO: 7.6 % — SIGNIFICANT CHANGE UP (ref 2–14)
NEUTROPHILS # BLD AUTO: 7.04 K/UL — SIGNIFICANT CHANGE UP (ref 1.8–7.4)
NEUTROPHILS NFR BLD AUTO: 74.1 % — SIGNIFICANT CHANGE UP (ref 43–77)
NRBC # BLD: 0 /100 WBCS — SIGNIFICANT CHANGE UP (ref 0–0)
NRBC # BLD: 0 /100 WBCS — SIGNIFICANT CHANGE UP (ref 0–0)
PCO2 BLDA: 60 MMHG — HIGH (ref 35–48)
PH BLDA: 7.49 — HIGH (ref 7.35–7.45)
PHOSPHATE SERPL-MCNC: 2.3 MG/DL — LOW (ref 2.5–4.5)
PHOSPHATE SERPL-MCNC: 2.7 MG/DL — SIGNIFICANT CHANGE UP (ref 2.5–4.5)
PLATELET # BLD AUTO: 290 K/UL — SIGNIFICANT CHANGE UP (ref 150–400)
PLATELET # BLD AUTO: 331 K/UL — SIGNIFICANT CHANGE UP (ref 150–400)
PO2 BLDA: 59 MMHG — LOW (ref 83–108)
POTASSIUM SERPL-MCNC: 3.4 MMOL/L — LOW (ref 3.5–5.3)
POTASSIUM SERPL-MCNC: 4.1 MMOL/L — SIGNIFICANT CHANGE UP (ref 3.5–5.3)
POTASSIUM SERPL-SCNC: 3.4 MMOL/L — LOW (ref 3.5–5.3)
POTASSIUM SERPL-SCNC: 4.1 MMOL/L — SIGNIFICANT CHANGE UP (ref 3.5–5.3)
PROT SERPL-MCNC: 5.6 G/DL — LOW (ref 6–8.3)
PROT SERPL-MCNC: 5.7 G/DL — LOW (ref 6–8.3)
RBC # BLD: 1.88 M/UL — LOW (ref 4.2–5.8)
RBC # BLD: 1.88 M/UL — LOW (ref 4.2–5.8)
RBC # FLD: 18.2 % — HIGH (ref 10.3–14.5)
RBC # FLD: 18.9 % — HIGH (ref 10.3–14.5)
SAO2 % BLDA: SIGNIFICANT CHANGE UP %
SODIUM SERPL-SCNC: 145 MMOL/L — SIGNIFICANT CHANGE UP (ref 135–145)
SODIUM SERPL-SCNC: 145 MMOL/L — SIGNIFICANT CHANGE UP (ref 135–145)
WBC # BLD: 12.43 K/UL — HIGH (ref 3.8–10.5)
WBC # BLD: 9.5 K/UL — SIGNIFICANT CHANGE UP (ref 3.8–10.5)
WBC # FLD AUTO: 12.43 K/UL — HIGH (ref 3.8–10.5)
WBC # FLD AUTO: 9.5 K/UL — SIGNIFICANT CHANGE UP (ref 3.8–10.5)

## 2021-04-27 PROCEDURE — 74174 CTA ABD&PLVS W/CONTRAST: CPT | Mod: 26

## 2021-04-27 PROCEDURE — 71045 X-RAY EXAM CHEST 1 VIEW: CPT | Mod: 26

## 2021-04-27 PROCEDURE — 70450 CT HEAD/BRAIN W/O DYE: CPT | Mod: 26

## 2021-04-27 RX ORDER — MORPHINE SULFATE 50 MG/1
2 CAPSULE, EXTENDED RELEASE ORAL ONCE
Refills: 0 | Status: DISCONTINUED | OUTPATIENT
Start: 2021-04-27 | End: 2021-04-27

## 2021-04-27 RX ORDER — OLANZAPINE 15 MG/1
2.5 TABLET, FILM COATED ORAL
Refills: 0 | Status: DISCONTINUED | OUTPATIENT
Start: 2021-04-27 | End: 2021-04-29

## 2021-04-27 RX ORDER — POTASSIUM CHLORIDE 20 MEQ
10 PACKET (EA) ORAL
Refills: 0 | Status: COMPLETED | OUTPATIENT
Start: 2021-04-27 | End: 2021-04-27

## 2021-04-27 RX ORDER — MIDAZOLAM HYDROCHLORIDE 1 MG/ML
2 INJECTION, SOLUTION INTRAMUSCULAR; INTRAVENOUS ONCE
Refills: 0 | Status: DISCONTINUED | OUTPATIENT
Start: 2021-04-27 | End: 2021-04-27

## 2021-04-27 RX ORDER — FENTANYL CITRATE 50 UG/ML
2.5 INJECTION INTRAVENOUS
Qty: 2500 | Refills: 0 | Status: DISCONTINUED | OUTPATIENT
Start: 2021-04-27 | End: 2021-04-28

## 2021-04-27 RX ORDER — POTASSIUM CHLORIDE 20 MEQ
40 PACKET (EA) ORAL ONCE
Refills: 0 | Status: COMPLETED | OUTPATIENT
Start: 2021-04-27 | End: 2021-04-27

## 2021-04-27 RX ORDER — FENTANYL CITRATE 50 UG/ML
1 INJECTION INTRAVENOUS
Qty: 2500 | Refills: 0 | Status: DISCONTINUED | OUTPATIENT
Start: 2021-04-27 | End: 2021-04-27

## 2021-04-27 RX ORDER — POTASSIUM CHLORIDE 20 MEQ
40 PACKET (EA) ORAL ONCE
Refills: 0 | Status: DISCONTINUED | OUTPATIENT
Start: 2021-04-27 | End: 2021-04-27

## 2021-04-27 RX ORDER — MIDAZOLAM HYDROCHLORIDE 1 MG/ML
4 INJECTION, SOLUTION INTRAMUSCULAR; INTRAVENOUS ONCE
Refills: 0 | Status: DISCONTINUED | OUTPATIENT
Start: 2021-04-27 | End: 2021-04-27

## 2021-04-27 RX ORDER — SODIUM CHLORIDE 9 MG/ML
500 INJECTION INTRAMUSCULAR; INTRAVENOUS; SUBCUTANEOUS ONCE
Refills: 0 | Status: COMPLETED | OUTPATIENT
Start: 2021-04-27 | End: 2021-04-27

## 2021-04-27 RX ADMIN — Medication 40 MILLIEQUIVALENT(S): at 06:29

## 2021-04-27 RX ADMIN — DEXMEDETOMIDINE HYDROCHLORIDE IN 0.9% SODIUM CHLORIDE 21 MICROGRAM(S)/KG/HR: 4 INJECTION INTRAVENOUS at 02:56

## 2021-04-27 RX ADMIN — Medication 5 MILLIGRAM(S): at 05:16

## 2021-04-27 RX ADMIN — CHLORHEXIDINE GLUCONATE 1 APPLICATION(S): 213 SOLUTION TOPICAL at 05:17

## 2021-04-27 RX ADMIN — FENTANYL CITRATE 1 PATCH: 50 INJECTION INTRAVENOUS at 07:26

## 2021-04-27 RX ADMIN — OLANZAPINE 2.5 MILLIGRAM(S): 15 TABLET, FILM COATED ORAL at 10:25

## 2021-04-27 RX ADMIN — Medication 5 MILLIGRAM(S): at 11:17

## 2021-04-27 RX ADMIN — Medication 5 MILLIGRAM(S): at 17:14

## 2021-04-27 RX ADMIN — Medication 100 MILLIEQUIVALENT(S): at 10:25

## 2021-04-27 RX ADMIN — DEXMEDETOMIDINE HYDROCHLORIDE IN 0.9% SODIUM CHLORIDE 21 MICROGRAM(S)/KG/HR: 4 INJECTION INTRAVENOUS at 08:39

## 2021-04-27 RX ADMIN — DEXMEDETOMIDINE HYDROCHLORIDE IN 0.9% SODIUM CHLORIDE 21 MICROGRAM(S)/KG/HR: 4 INJECTION INTRAVENOUS at 19:32

## 2021-04-27 RX ADMIN — DEXMEDETOMIDINE HYDROCHLORIDE IN 0.9% SODIUM CHLORIDE 21 MICROGRAM(S)/KG/HR: 4 INJECTION INTRAVENOUS at 15:08

## 2021-04-27 RX ADMIN — Medication 100 MILLIEQUIVALENT(S): at 09:22

## 2021-04-27 RX ADMIN — CHLORHEXIDINE GLUCONATE 15 MILLILITER(S): 213 SOLUTION TOPICAL at 17:14

## 2021-04-27 RX ADMIN — MIDODRINE HYDROCHLORIDE 10 MILLIGRAM(S): 2.5 TABLET ORAL at 05:17

## 2021-04-27 RX ADMIN — FENTANYL CITRATE 21 MICROGRAM(S)/KG/HR: 50 INJECTION INTRAVENOUS at 13:13

## 2021-04-27 RX ADMIN — Medication 5 MILLIGRAM(S): at 23:21

## 2021-04-27 RX ADMIN — Medication 1000 MILLIGRAM(S): at 11:17

## 2021-04-27 RX ADMIN — FENTANYL CITRATE 8.39 MICROGRAM(S)/KG/HR: 50 INJECTION INTRAVENOUS at 02:56

## 2021-04-27 RX ADMIN — MIDAZOLAM HYDROCHLORIDE 2 MILLIGRAM(S): 1 INJECTION, SOLUTION INTRAMUSCULAR; INTRAVENOUS at 09:42

## 2021-04-27 RX ADMIN — MIDAZOLAM HYDROCHLORIDE 2 MILLIGRAM(S): 1 INJECTION, SOLUTION INTRAMUSCULAR; INTRAVENOUS at 16:57

## 2021-04-27 RX ADMIN — Medication 40 MILLIEQUIVALENT(S): at 09:43

## 2021-04-27 RX ADMIN — SODIUM CHLORIDE 500 MILLILITER(S): 9 INJECTION INTRAMUSCULAR; INTRAVENOUS; SUBCUTANEOUS at 18:41

## 2021-04-27 RX ADMIN — Medication 1: at 00:14

## 2021-04-27 RX ADMIN — Medication 1: at 05:50

## 2021-04-27 RX ADMIN — CHLORHEXIDINE GLUCONATE 15 MILLILITER(S): 213 SOLUTION TOPICAL at 05:16

## 2021-04-27 RX ADMIN — MORPHINE SULFATE 2 MILLIGRAM(S): 50 CAPSULE, EXTENDED RELEASE ORAL at 00:00

## 2021-04-27 RX ADMIN — Medication 100 MILLIEQUIVALENT(S): at 08:39

## 2021-04-27 RX ADMIN — MIDAZOLAM HYDROCHLORIDE 2 MILLIGRAM(S): 1 INJECTION, SOLUTION INTRAMUSCULAR; INTRAVENOUS at 19:25

## 2021-04-27 RX ADMIN — OLANZAPINE 2.5 MILLIGRAM(S): 15 TABLET, FILM COATED ORAL at 17:14

## 2021-04-27 RX ADMIN — PANTOPRAZOLE SODIUM 40 MILLIGRAM(S): 20 TABLET, DELAYED RELEASE ORAL at 17:14

## 2021-04-27 RX ADMIN — PANTOPRAZOLE SODIUM 40 MILLIGRAM(S): 20 TABLET, DELAYED RELEASE ORAL at 05:17

## 2021-04-27 RX ADMIN — MORPHINE SULFATE 2 MILLIGRAM(S): 50 CAPSULE, EXTENDED RELEASE ORAL at 17:11

## 2021-04-27 NOTE — CHART NOTE - NSCHARTNOTEFT_GEN_A_CORE
Spoke to brothkai Amin to update on patient's clinical status. Brother was informed about patient's requirement of multiple blood transfusions and persistent low hemoglobin. There is concern for bleed, CT scan abdomen and head to be done today.   Brother was also informed about patients medical management and plan. Questions answered.

## 2021-04-27 NOTE — PROGRESS NOTE ADULT - ASSESSMENT
Assessment and plan: 56 y/o M with no PMH is admitted to ICU for AHRF 2/2 covid19 pna       1. Acute hypoxic respiratory failure  2. ARDS 2/2 Covid pneumonia  3. Transaminitis  4. Prediabetes  5. Abnormal TSH    =================== Neuro============================  -Alert and oriented x 3 at baseline   -Intubated and sedated 3/29 on Vent  -Trach 4/22 continues on Vent    -Continue w/ fentanyl and Precedex   -C/w fentanyl patch   -Off propofol  -CT head unremarkable     ================= Cardiovascular==========================  # Hypotension      C/w midodrine 10 mg q8hrs   Off pressors     # TACHYCARDIA: recurrent episodes   -Lopressor PRN pushes   -HR in 110's  -Continue monitoring     ================- Pulm=================================  #Acute hypoxic respiratory failure: secondary to Covid19 pneumonia  #ARDS  -Was on HFNC then got intubated 3/29  -D-dimer initially elevated on presentation, trending down    -s/p Nimbex and Epifanio at different occasions for vent synchrony  -s/p Pigtail on left chest , removed on 4/15  -Large Pneumothorax was noted on 4/18 on left side, s/p chest tube placement, still has pneumothorax     -Remdesivir was discontinued due to positive antibodies   - Finished Dexamethasone   - Prednisone taper Finished  - Covid19 PCR negative now, off isolation       # Bacterial Pneumonia  - stable infiltrate on CXR ,likely developed VAP, Finished ABx Zosyn, meropenem, s/p 1 dose of Vancomycin  - MRSA negative from 3/26  - Sputum Cx growing few gram negative rods, CRE  - Pulm. Dr. Ryan  - ID Dr Anand       #Pneumothorax and pneumomediastinum:   -X-ray chest: remains with left pneumothorax  -Thoracic surgery following  -s/p Chest tube placed 4/8 pigtail to wall suction discontinued on 4/15  -On 4/18 chest tube was placed back on left lung to treat pneumothorax  -CXR 4/26 still shows left lung pneumothorax        ==================ID===================================  Likely bacterial pneumonia   -leukocytosis resolved  -No more fever, On Tylenol PRN only   -Finished Meropenem  -plan as above     ================= Nephro================================  -Pitting edema to both lower arms  -on condom cath   -monitor I/Os , good urine output overall  -s/p Albumin x 2 days on 4/10  -Lasix 40mg BID with Albumin 25% Q6 for 48 hours to help with urine output and fluid status.( 4/15) Albumin finished , was D/jennifer on 4/19, urine output decreasing  - Patient can get Lasix as needed   - Metabolic Alkalosis likely due to compensation, improving post Diamox     =================GI====================================  Transaminitis:   likely secondary to Covid19   - and  on presentation   hepatitis panel -ve  -Improved, now normalized   -continue to monitor LFT    Diet:   NGT (3/29) with tube feed  Bowel regimen   G tube placed on feeds        ================ Heme==================================  Elevated d-dimer: likely secondary to Covid19   -D-dimer 423 on admission  -Last D-dimer 1434  -continue prophylactic Lovenox 40 mg daily    Anemia  Hgb 8.8 dropped to 6.8  Will transfuse 1 unit of PRBC   No active signs of bleeding      =================Endocrine===============================  Prediabetes:    -A1c 5.8  -BS controlled  -continue HSS  -monitor FS while on steroids    Abnormal TSH:  -TSH level noted 0.26,   -repeat TSH 0.37 and Free T4 1.82    ================= Skin/Catheters============================  No rashes. Peripheral IV lines.   RIJ 4/13  RRA 3/29, Removed  RBA 4/20.    - =================Prophylaxis =============================  Lovenox for DVT proph : Lovenox 40mg SQ daily  Protonix for  GI proph    ==================GOC==================================   FULL CODE Assessment and plan: 54 y/o M with no PMH is admitted to ICU for AHRF 2/2 covid19 pna     1. Acute hypoxic respiratory failure  2. ARDS 2/2 Covid pneumonia  3. Transaminitis  4. Prediabetes  5. Abnormal TSH    =================== Neuro============================  -Alert and oriented x 3 at baseline   -Intubated and sedated 3/29 on Vent  -Trach 4/22 continues on Vent    -Continue w/ fentanyl and Precedex   -C/w fentanyl patch   -Off propofol  -CT head unremarkable     ================= Cardiovascular==========================  # Hypotension improving  Decreased midodrine to 5 mg q8hrs   Off pressors     # TACHYCARDIA: recurrent episodes   -Lopressor PRN pushes   -HR in 110's  -Continue monitoring     ================- Pulm=================================  #Acute hypoxic respiratory failure: secondary to Covid19 pneumonia  #ARDS  -Was on HFNC then got intubated 3/29  -D-dimer initially elevated on presentation, trending down    -s/p Nimbex and Epifanio at different occasions for vent synchrony  -s/p Pigtail on left chest, removed on 4/15  -Large Pneumothorax was noted on 4/18 on left side, s/p chest tube placement, still has pneumothorax- resolving      -Remdesivir was discontinued due to positive antibodies   - Finished Dexamethasone   - Prednisone taper Finished  - Covid19 PCR negative now, off isolation       # Bacterial Pneumonia  - stable infiltrate on CXR ,likely developed VAP, Finished ABx Zosyn, meropenem, s/p 1 dose of Vancomycin  - MRSA negative from 3/26  - Sputum Cx growing few gram negative rods, CRE  - Pulm. Dr. Ryan  - ID Dr Anand       #Pneumothorax and pneumomediastinum:   -X-ray chest: remains with left pneumothorax  -Thoracic surgery following  -s/p Chest tube placed 4/8 pigtail to wall suction discontinued on 4/15  -On 4/18 chest tube was placed back on left lung to treat pneumothorax- resolving   -CXR 4/26 still shows left lung pneumothorax - CXR 4/27 shows some improvement on pneumothorax        ==================ID===================================  Likely bacterial pneumonia   -leukocytosis resolved  -No more fever, On Tylenol PRN only   -Finished Meropenem  -plan as above     ================= Nephro================================  -Pitting edema to both lower arms  -on condom cath   -monitor I/Os , good urine output overall  -s/p Albumin x 2 days on 4/10  -Lasix 40mg BID with Albumin 25% Q6 for 48 hours to help with urine output and fluid status.( 4/15) Albumin finished , was D/jennifer on 4/19, urine output decreasing  - Patient can get Lasix as needed   - Metabolic Alkalosis likely due to compensation, improving post Diamox     =================GI====================================  Transaminitis:   likely secondary to Covid19   - and  on presentation   hepatitis panel -ve  -Improved, now normalized   -continue to monitor LFT    Diet:   S/p NGT (3/29) with tube feed  Bowel regimen discontinued today as patient is having frequent BM  G tube placed on feeds        ================ Heme==================================  Elevated d-dimer: likely secondary to Covid19   -D-dimer 423 on admission  -Last D-dimer 1434  -Discontinued prophylactic Lovenox 40 mg daily for concern of bleed (drop in hb)    Anemia  On 4/26 Hgb 8.8 dropped to 6.8, s/p 1 unit of PRBC repeat hb 7.2  Will transfuse another unit of PRBC   Will get CTA abdomen to r/o any bleeding causes   No active signs of bleeding      =================Endocrine===============================  Prediabetes:  -A1c 5.8  -BS controlled  -continue HSS    Abnormal TSH:  -TSH level noted 0.26,   -repeat TSH 0.37 and Free T4 1.82    ================= Skin/Catheters============================  No rashes. Peripheral IV lines.   RIJ 4/13  RRA 3/29, Removed  RBA 4/20    - =================Prophylaxis =============================  Lovenox for DVT proph : discontinued Lovenox for now - concern of bleed due to drop in Hb  Protonix for  GI proph    ==================GOC==================================   FULL CODE Assessment and plan: 54 y/o M with no PMH is admitted to ICU for AHRF 2/2 covid19 pna     1. Acute hypoxic respiratory failure  2. ARDS 2/2 Covid pneumonia  3. Transaminitis  4. Prediabetes  5. Abnormal TSH    =================== Neuro============================  -Alert and oriented x 3 at baseline   -Intubated and sedated 3/29 on Vent  -Trach 4/22 continues on Vent    -Continue w/ fentanyl and Precedex   -C/w fentanyl patch   -Off propofol  -CT head unremarkable     ================= Cardiovascular==========================  # Hypotension improving  Decreased midodrine to 5 mg q8hrs   Off pressors     # TACHYCARDIA: recurrent episodes   -Lopressor PRN pushes   -HR in 110's  -Continue monitoring     ================- Pulm=================================  #Acute hypoxic respiratory failure: secondary to Covid19 pneumonia  #ARDS  -Was on HFNC then got intubated 3/29  -D-dimer initially elevated on presentation, trending down    -s/p Nimbex and Epifanio at different occasions for vent synchrony  -s/p Pigtail on left chest, removed on 4/15  -Large Pneumothorax was noted on 4/18 on left side, s/p chest tube placement, still has pneumothorax- resolving      -Remdesivir was discontinued due to positive antibodies   - Finished Dexamethasone   - Prednisone taper Finished  - Covid19 PCR negative now, off isolation       # Bacterial Pneumonia  - stable infiltrate on CXR ,likely developed VAP, Finished ABx Zosyn, meropenem, s/p 1 dose of Vancomycin  - MRSA negative from 3/26  - Sputum Cx growing few gram negative rods, CRE  - Pulm. Dr. Ryan  - ID Dr Anand       #Pneumothorax and pneumomediastinum:   -X-ray chest: remains with left pneumothorax  -Thoracic surgery following  -s/p Chest tube placed 4/8 pigtail to wall suction discontinued on 4/15  -On 4/18 chest tube was placed back on left lung to treat pneumothorax- resolving   -CXR 4/26 still shows left lung pneumothorax - CXR 4/27 shows some improvement on pneumothorax        ==================ID===================================  Likely bacterial pneumonia   -leukocytosis resolved  -No more fever, On Tylenol PRN only   -Finished Meropenem  -plan as above     ================= Nephro================================  -Pitting edema to both lower arms  -on condom cath   -monitor I/Os , good urine output overall  -s/p Albumin x 2 days on 4/10  -Lasix 40mg BID with Albumin 25% Q6 for 48 hours to help with urine output and fluid status.( 4/15) Albumin finished , was D/jennifer on 4/19, urine output decreasing  - Patient can get Lasix as needed   - Metabolic Alkalosis likely due to compensation, improving post Diamox     =================GI====================================  Transaminitis:   likely secondary to Covid19   - and  on presentation   hepatitis panel -ve  -Improved, now normalized   -continue to monitor LFT    Diet:   S/p NGT (3/29) with tube feed  Bowel regimen discontinued today as patient is having frequent BM  G tube placed on feeds        ================ Heme==================================  Elevated d-dimer: likely secondary to Covid19   -D-dimer 423 on admission  -Last D-dimer 1434  -Discontinued prophylactic Lovenox 40 mg daily for concern of bleed (drop in hb)    Anemia  On 4/26 Hgb 8.8 dropped to 6.8, s/p 1 unit of PRBC repeat hb 7.2  Will transfuse another unit of PRBC   Will get CTA abdomen to r/o any bleeding causes   No active signs of bleeding      =================Endocrine===============================  Prediabetes:  -A1c 5.8  -BS controlled  -continue HSS    Abnormal TSH:  -TSH level noted 0.26,   -repeat TSH 0.37 and Free T4 1.82    ================= Skin/Catheters============================  No rashes. Peripheral IV lines.     - =================Prophylaxis =============================  Lovenox for DVT proph : discontinued Lovenox for now - concern of bleed due to drop in Hb  Protonix for  GI proph    ==================GOC==================================   FULL CODE Assessment and plan: 54 y/o M with no PMH is admitted to ICU for AHRF 2/2 covid19 pna     1. Acute hypoxic respiratory failure  2. ARDS 2/2 Covid pneumonia  3. Transaminitis  4. Prediabetes  5. Abnormal TSH    =================== Neuro============================  -Alert and oriented x 3 at baseline   -Intubated and sedated 3/29 on Vent  -Trach 4/22 continues on Vent    -Continue w/ fentanyl and Precedex   -C/w fentanyl patch   -Off propofol  -CT head unremarkable     ================= Cardiovascular==========================  # Hypotension improving  Decreased midodrine to 5 mg q8hrs   Off pressors     # TACHYCARDIA: recurrent episodes   -Lopressor PRN pushes   -HR in 110's  -Continue monitoring     ================- Pulm=================================  #Acute hypoxic respiratory failure: secondary to Covid19 pneumonia  #ARDS  -Was on HFNC then got intubated 3/29  -D-dimer initially elevated on presentation, trending down    -s/p Nimbex and Epifanio at different occasions for vent synchrony  -s/p Pigtail on left chest, removed on 4/15  -Large Pneumothorax was noted on 4/18 on left side, s/p chest tube placement, still has pneumothorax- resolving    -Trach 4/22 continues on Vent   -Remdesivir was discontinued due to positive antibodies   - Finished Dexamethasone   - Prednisone taper Finished  - Covid19 PCR negative now, off isolation       # Bacterial Pneumonia  - stable infiltrate on CXR ,likely developed VAP, Finished ABx Zosyn, meropenem, s/p 1 dose of Vancomycin  - MRSA negative from 3/26  - Sputum Cx growing few gram negative rods, CRE  - Pulm. Dr. Ryan  - ID Dr Anand       #Pneumothorax and pneumomediastinum:   -X-ray chest: remains with left pneumothorax  -Thoracic surgery following  -s/p Chest tube placed 4/8 pigtail to wall suction discontinued on 4/15  -On 4/18 chest tube was placed back on left lung to treat pneumothorax- resolving   -CXR 4/26 still shows left lung pneumothorax - CXR 4/27 shows some improvement on pneumothorax        ==================ID===================================  Likely bacterial pneumonia   -leukocytosis resolved  -No more fever, On Tylenol PRN only   -Finished Meropenem  -plan as above     ================= Nephro================================  -Pitting edema to both lower arms  -on condom cath   -monitor I/Os , good urine output overall  -s/p Albumin x 2 days on 4/10  -Lasix 40mg BID with Albumin 25% Q6 for 48 hours to help with urine output and fluid status.( 4/15) Albumin finished , was D/jennifer on 4/19, urine output decreasing  - Patient can get Lasix as needed   - Metabolic Alkalosis likely due to compensation, improving post Diamox     =================GI====================================  Transaminitis:   likely secondary to Covid19   - and  on presentation   hepatitis panel -ve  -Improved, now normalized   -continue to monitor LFT    Diet:   S/p NGT (3/29) with tube feed  Bowel regimen discontinued today as patient is having frequent BM  G tube 4/23, on feeds        ================ Heme==================================  Elevated d-dimer: likely secondary to Covid19   -D-dimer 423 on admission  -Last D-dimer 1434  -Discontinued prophylactic Lovenox 40 mg daily for concern of bleed (drop in hb)    Anemia  On 4/26 Hgb 8.8 dropped to 6.8, s/p 1 unit of PRBC repeat hb 7.2  Will transfuse another unit of PRBC   Will get CTA abdomen to r/o any bleeding causes   No active signs of bleeding (No hematemesis, melena or hematuria)      =================Endocrine===============================  Prediabetes:  -A1c 5.8  -BS controlled  -continue HSS    Abnormal TSH:  -TSH level noted 0.26,   -repeat TSH 0.37 and Free T4 1.82    ================= Skin/Catheters============================  No rashes. Peripheral IV lines.     - =================Prophylaxis =============================  Lovenox for DVT proph : discontinued Lovenox for now - concern of bleed due to drop in Hb  Protonix for  GI proph    ==================GOC==================================   FULL CODE Assessment and plan: 56 y/o M with no PMH is admitted to ICU for AHRF 2/2 covid19 pna     1. Acute hypoxic respiratory failure  2. ARDS 2/2 Covid pneumonia  3. Transaminitis  4. Prediabetes  5. Abnormal TSH    =================== Neuro============================  -Alert and oriented x 3 at baseline   -Intubated and sedated 3/29 on Vent  -Trach 4/22 continues on Vent    -Continue w/ fentanyl and Precedex   -C/w fentanyl patch   -Off propofol  -CT head unremarkable     ================= Cardiovascular==========================  # Hypotension improving  Decreased midodrine to 5 mg q8hrs   Off pressors     # TACHYCARDIA: recurrent episodes   -Lopressor PRN pushes   -HR in 110's  -Continue monitoring     ================- Pulm=================================  #Acute hypoxic respiratory failure: secondary to Covid19 pneumonia  #ARDS  -Was on HFNC then got intubated 3/29  -D-dimer initially elevated on presentation, trending down    -s/p Nimbex and Epifanio at different occasions for vent synchrony  -s/p Pigtail on left chest, removed on 4/15  -Large Pneumothorax was noted on 4/18 on left side, s/p chest tube placement, still has pneumothorax- resolving    -Trach 4/22 continues on Vent   -Remdesivir was discontinued due to positive antibodies   - Finished Dexamethasone   - Prednisone taper Finished  - Covid19 PCR negative now, off isolation       # Bacterial Pneumonia  - stable infiltrate on CXR ,likely developed VAP, Finished ABx Zosyn, meropenem, s/p 1 dose of Vancomycin  - MRSA negative from 3/26  - Sputum Cx growing few gram negative rods, CRE  - Pulm. Dr. Ryan  - ID Dr Anand       #Pneumothorax and pneumomediastinum:   -X-ray chest: remains with left pneumothorax  -Thoracic surgery following  -s/p Chest tube placed 4/8 pigtail to wall suction discontinued on 4/15  -On 4/18 chest tube was placed back on left lung to treat pneumothorax- resolving   -CXR 4/26 still shows left lung pneumothorax - CXR 4/27 shows some improvement on pneumothorax        ==================ID===================================  Likely bacterial pneumonia   -leukocytosis resolved  -No more fever, On Tylenol PRN only   -Finished Meropenem  -plan as above     ================= Nephro================================  -Pitting edema to both lower arms  -on condom cath   -monitor I/Os , good urine output overall  -s/p Albumin x 2 days on 4/10  -Lasix 40mg BID with Albumin 25% Q6 for 48 hours to help with urine output and fluid status.( 4/15) Albumin finished , was D/jennifer on 4/19, urine output decreasing  - Patient can get Lasix as needed   - Metabolic Alkalosis likely due to compensation, improving post Diamox     =================GI====================================  Transaminitis:   likely secondary to Covid19   - and  on presentation   hepatitis panel -ve  -Improved, now normalized   -continue to monitor LFT    Diet:   S/p NGT (3/29) with tube feed  Bowel regimen discontinued today as patient is having frequent BM  G tube 4/23, holding feeds        ================ Heme==================================  Elevated d-dimer: likely secondary to Covid19   -D-dimer 423 on admission  -Last D-dimer 1434  -Discontinued prophylactic Lovenox 40 mg daily for concern of bleed (drop in hb)    Anemia  On 4/26 Hgb 8.8 dropped to 6.8, s/p 1 unit of PRBC repeat hb 7.2  Will transfuse another unit of PRBC   Will get CTA abdomen to r/o any bleeding causes   No active signs of bleeding (No hematemesis, melena or hematuria)      =================Endocrine===============================  Prediabetes:  -A1c 5.8  -BS controlled  -continue HSS    Abnormal TSH:  -TSH level noted 0.26,   -repeat TSH 0.37 and Free T4 1.82    ================= Skin/Catheters============================  No rashes. Peripheral IV lines.   Bilateral upper extremity pitting edema     - =================Prophylaxis =============================  Lovenox for DVT proph : discontinued Lovenox for now - concern of bleed due to drop in Hb  Protonix for  GI proph    ==================GOC==================================   FULL CODE

## 2021-04-27 NOTE — PROGRESS NOTE ADULT - SUBJECTIVE AND OBJECTIVE BOX
INTERVAL HPI/OVERNIGHT EVENTS: ***    PRESSORS: [ ] YES [ ] NO  WHICH:    ANTIBIOTICS:                  DATE STARTED:  ANTIBIOTICS:                  DATE STARTED:  ANTIBIOTICS:                  DATE STARTED:    Antimicrobial:    Cardiovascular:  midodrine. 10 milliGRAM(s) Oral three times a day    Pulmonary:  ALBUTerol    90 MICROgram(s) HFA Inhaler 2 Puff(s) Inhalation every 6 hours PRN    Hematalogic:  aspirin  chewable 81 milliGRAM(s) Oral daily  enoxaparin Injectable 40 milliGRAM(s) SubCutaneous daily    Other:  artificial  tears Solution 1 Drop(s) Both EYES every 4 hours PRN  ascorbic acid 1000 milliGRAM(s) Oral daily  chlorhexidine 0.12% Liquid 15 milliLiter(s) Oral Mucosa every 12 hours  chlorhexidine 2% Cloths 1 Application(s) Topical <User Schedule>  dexMEDEtomidine Infusion 1 MICROgram(s)/kG/Hr IV Continuous <Continuous>  diazepam    Tablet 5 milliGRAM(s) Oral every 6 hours  fentaNYL   Infusion. 2.5 MICROgram(s)/kG/Hr IV Continuous <Continuous>  fentaNYL   Patch  25 MICROgram(s)/Hr. 1 Patch Transdermal every 48 hours  insulin lispro (ADMELOG) corrective regimen sliding scale   SubCutaneous every 6 hours  pantoprazole  Injectable 40 milliGRAM(s) IV Push every 12 hours  polyethylene glycol 3350 17 Gram(s) Oral daily  potassium chloride   Powder 40 milliEquivalent(s) Enteral Tube once  potassium chloride  10 mEq/100 mL IVPB 10 milliEquivalent(s) IV Intermittent every 1 hour  sodium chloride 0.9% lock flush 10 milliLiter(s) IV Push every 1 hour PRN    ALBUTerol    90 MICROgram(s) HFA Inhaler 2 Puff(s) Inhalation every 6 hours PRN  artificial  tears Solution 1 Drop(s) Both EYES every 4 hours PRN  ascorbic acid 1000 milliGRAM(s) Oral daily  aspirin  chewable 81 milliGRAM(s) Oral daily  chlorhexidine 0.12% Liquid 15 milliLiter(s) Oral Mucosa every 12 hours  chlorhexidine 2% Cloths 1 Application(s) Topical <User Schedule>  dexMEDEtomidine Infusion 1 MICROgram(s)/kG/Hr IV Continuous <Continuous>  diazepam    Tablet 5 milliGRAM(s) Oral every 6 hours  enoxaparin Injectable 40 milliGRAM(s) SubCutaneous daily  fentaNYL   Infusion. 2.5 MICROgram(s)/kG/Hr IV Continuous <Continuous>  fentaNYL   Patch  25 MICROgram(s)/Hr. 1 Patch Transdermal every 48 hours  insulin lispro (ADMELOG) corrective regimen sliding scale   SubCutaneous every 6 hours  midodrine. 10 milliGRAM(s) Oral three times a day  pantoprazole  Injectable 40 milliGRAM(s) IV Push every 12 hours  polyethylene glycol 3350 17 Gram(s) Oral daily  potassium chloride   Powder 40 milliEquivalent(s) Enteral Tube once  potassium chloride  10 mEq/100 mL IVPB 10 milliEquivalent(s) IV Intermittent every 1 hour  sodium chloride 0.9% lock flush 10 milliLiter(s) IV Push every 1 hour PRN    Drug Dosing Weight  Height (cm): 167.6 (23 Apr 2021 23:15)  Weight (kg): 83.9 (23 Apr 2021 23:15)  BMI (kg/m2): 29.9 (23 Apr 2021 23:15)  BSA (m2): 1.93 (23 Apr 2021 23:15)    CENTRAL LINE: [ ] YES [ ] NO  LOCATION:         RIVERA: [ ] YES [ ] NO          A-LINE:  [ ] YES [ ] NO  LOCATION:             ICU Vital Signs Last 24 Hrs  T(C): 37.3 (27 Apr 2021 08:00), Max: 37.6 (26 Apr 2021 11:00)  T(F): 99.1 (27 Apr 2021 08:00), Max: 99.7 (26 Apr 2021 11:00)  HR: 109 (27 Apr 2021 08:00) (65 - 121)  BP: --  BP(mean): --  ABP: 118/67 (27 Apr 2021 08:00) (85/51 - 146/80)  ABP(mean): 88 (27 Apr 2021 08:00) (65 - 109)  RR: 28 (27 Apr 2021 08:00) (16 - 35)  SpO2: 100% (27 Apr 2021 08:00) (90% - 100%)      ABG - ( 27 Apr 2021 04:24 )  pH, Arterial: 7.49  pH, Blood: x     /  pCO2: 60    /  pO2: 59    / HCO3: 46    / Base Excess: 18.9  /  SaO2: NR                    04-26 @ 07:01  -  04-27 @ 07:00  --------------------------------------------------------  IN: 1980.1 mL / OUT: 1215 mL / NET: 765.1 mL        Mode: AC/ CMV (Assist Control/ Continuous Mandatory Ventilation)  RR (machine): 28  TV (machine): 430  FiO2: 60  PEEP: 3  ITime: 1  MAP: 13  PIP: 40        PHYSICAL EXAM:    GENERAL: NAD  EYES: EOMI, PERRLA  NECK: Supple, No JVD; Normal thyroid; Trachea midline: No LAD   NERVOUS SYSTEM:  Alert & Oriented X3,  Motor Strength 5/5 B/L upper and lower extremities; DTRs 2+ intact and symmetric  CHEST/LUNG: No rales, rhonchi, wheezing, breath sounds present bilaterally  HEART: Regular rate and rhythm; No murmurs, no gallops  ABDOMEN: Soft, Nontender, Nondistended; Bowel sounds present, no pain or masses on palpation  : voiding well, Rivera in place  EXTREMITIES:  2+ Peripheral Pulses, No clubbing, cyanosis, or edema  SKIN: warm, intact, no lesions         LABS:  CBC Full  -  ( 27 Apr 2021 04:08 )  WBC Count : 9.50 K/uL  RBC Count : 1.88 M/uL  Hemoglobin : 7.0 g/dL  Hematocrit : 20.6 %  Platelet Count - Automated : 290 K/uL  Mean Cell Volume : 109.6 fl  Mean Cell Hemoglobin : 37.2 pg  Mean Cell Hemoglobin Concentration : 34.0 gm/dL  Auto Neutrophil # : 7.04 K/uL  Auto Lymphocyte # : 1.32 K/uL  Auto Monocyte # : 0.72 K/uL  Auto Eosinophil # : 0.03 K/uL  Auto Basophil # : 0.03 K/uL  Auto Neutrophil % : 74.1 %  Auto Lymphocyte % : 13.9 %  Auto Monocyte % : 7.6 %  Auto Eosinophil % : 0.3 %  Auto Basophil % : 0.3 %    04-27    145  |  108  |  10  ----------------------------<  148<H>  3.4<L>   |  36<H>  |  <0.20<L>    Ca    7.9<L>      27 Apr 2021 04:08  Phos  2.7     04-27  Mg     2.0     04-27    TPro  5.6<L>  /  Alb  1.7<L>  /  TBili  0.3  /  DBili  x   /  AST  25  /  ALT  20  /  AlkPhos  96  04-27            RADIOLOGY & ADDITIONAL STUDIES REVIEWED:  ***    [ ]GOALS OF CARE DISCUSSION WITH PATIENT/FAMILY/PROXY:    CRITICAL CARE TIME SPENT: 35 minutes   INTERVAL HPI/OVERNIGHT EVENTS: Patient's hemoglobin dropped again to 7, will give one unit of PRBC and will take patient to CTA abdomen to r/o any abdominal causes.     PRESSORS: [ ] YES [ x] NO    Antimicrobial:    Cardiovascular:  midodrine. 10 milliGRAM(s) Oral three times a day    Pulmonary:  ALBUTerol    90 MICROgram(s) HFA Inhaler 2 Puff(s) Inhalation every 6 hours PRN    Hematalogic:  aspirin  chewable 81 milliGRAM(s) Oral daily  enoxaparin Injectable 40 milliGRAM(s) SubCutaneous daily    Other:  artificial  tears Solution 1 Drop(s) Both EYES every 4 hours PRN  ascorbic acid 1000 milliGRAM(s) Oral daily  chlorhexidine 0.12% Liquid 15 milliLiter(s) Oral Mucosa every 12 hours  chlorhexidine 2% Cloths 1 Application(s) Topical <User Schedule>  dexMEDEtomidine Infusion 1 MICROgram(s)/kG/Hr IV Continuous <Continuous>  diazepam    Tablet 5 milliGRAM(s) Oral every 6 hours  fentaNYL   Infusion. 2.5 MICROgram(s)/kG/Hr IV Continuous <Continuous>  fentaNYL   Patch  25 MICROgram(s)/Hr. 1 Patch Transdermal every 48 hours  insulin lispro (ADMELOG) corrective regimen sliding scale   SubCutaneous every 6 hours  pantoprazole  Injectable 40 milliGRAM(s) IV Push every 12 hours  polyethylene glycol 3350 17 Gram(s) Oral daily  potassium chloride   Powder 40 milliEquivalent(s) Enteral Tube once  potassium chloride  10 mEq/100 mL IVPB 10 milliEquivalent(s) IV Intermittent every 1 hour  sodium chloride 0.9% lock flush 10 milliLiter(s) IV Push every 1 hour PRN    ALBUTerol    90 MICROgram(s) HFA Inhaler 2 Puff(s) Inhalation every 6 hours PRN  artificial  tears Solution 1 Drop(s) Both EYES every 4 hours PRN  ascorbic acid 1000 milliGRAM(s) Oral daily  aspirin  chewable 81 milliGRAM(s) Oral daily  chlorhexidine 0.12% Liquid 15 milliLiter(s) Oral Mucosa every 12 hours  chlorhexidine 2% Cloths 1 Application(s) Topical <User Schedule>  dexMEDEtomidine Infusion 1 MICROgram(s)/kG/Hr IV Continuous <Continuous>  diazepam    Tablet 5 milliGRAM(s) Oral every 6 hours  enoxaparin Injectable 40 milliGRAM(s) SubCutaneous daily  fentaNYL   Infusion. 2.5 MICROgram(s)/kG/Hr IV Continuous <Continuous>  fentaNYL   Patch  25 MICROgram(s)/Hr. 1 Patch Transdermal every 48 hours  insulin lispro (ADMELOG) corrective regimen sliding scale   SubCutaneous every 6 hours  midodrine. 10 milliGRAM(s) Oral three times a day  pantoprazole  Injectable 40 milliGRAM(s) IV Push every 12 hours  polyethylene glycol 3350 17 Gram(s) Oral daily  potassium chloride   Powder 40 milliEquivalent(s) Enteral Tube once  potassium chloride  10 mEq/100 mL IVPB 10 milliEquivalent(s) IV Intermittent every 1 hour  sodium chloride 0.9% lock flush 10 milliLiter(s) IV Push every 1 hour PRN    Drug Dosing Weight  Height (cm): 167.6 (23 Apr 2021 23:15)  Weight (kg): 83.9 (23 Apr 2021 23:15)  BMI (kg/m2): 29.9 (23 Apr 2021 23:15)  BSA (m2): 1.93 (23 Apr 2021 23:15)    CENTRAL LINE: [ ] YES [ ] NO  LOCATION:         RIVERA: [ ] YES [ ] NO          A-LINE:  [ ] YES [ ] NO  LOCATION:             ICU Vital Signs Last 24 Hrs  T(C): 37.3 (27 Apr 2021 08:00), Max: 37.6 (26 Apr 2021 11:00)  T(F): 99.1 (27 Apr 2021 08:00), Max: 99.7 (26 Apr 2021 11:00)  HR: 109 (27 Apr 2021 08:00) (65 - 121)  BP: --  BP(mean): --  ABP: 118/67 (27 Apr 2021 08:00) (85/51 - 146/80)  ABP(mean): 88 (27 Apr 2021 08:00) (65 - 109)  RR: 28 (27 Apr 2021 08:00) (16 - 35)  SpO2: 100% (27 Apr 2021 08:00) (90% - 100%)      ABG - ( 27 Apr 2021 04:24 )  pH, Arterial: 7.49  pH, Blood: x     /  pCO2: 60    /  pO2: 59    / HCO3: 46    / Base Excess: 18.9  /  SaO2: NR                    04-26 @ 07:01  -  04-27 @ 07:00  --------------------------------------------------------  IN: 1980.1 mL / OUT: 1215 mL / NET: 765.1 mL        Mode: AC/ CMV (Assist Control/ Continuous Mandatory Ventilation)  RR (machine): 28  TV (machine): 430  FiO2: 60  PEEP: 3  ITime: 1  MAP: 13  PIP: 40        PHYSICAL EXAM:    GENERAL: NAD, sat above 90%  EYES: EOMI, PERRLA  NECK: Supple, No JVD; Normal thyroid; Trachea midline: No LAD, trach    NERVOUS SYSTEM: sedated   CHEST/LUNG: No rales, rhonchi, wheezing, breath sounds present bilaterally  HEART: Regular rate and rhythm; No murmurs, no gallops  ABDOMEN: Soft, Nontender, Nondistended; Bowel sounds present, no pain or masses on palpation, G tube in place- functional, no signs of infection   : condom cath in place  EXTREMITIES:  2+ Peripheral Pulses, No clubbing or cyanosis. Bilateral upper extremity edema  SKIN: warm, intact, no lesions          LABS:  CBC Full  -  ( 27 Apr 2021 04:08 )  WBC Count : 9.50 K/uL  RBC Count : 1.88 M/uL  Hemoglobin : 7.0 g/dL  Hematocrit : 20.6 %  Platelet Count - Automated : 290 K/uL  Mean Cell Volume : 109.6 fl  Mean Cell Hemoglobin : 37.2 pg  Mean Cell Hemoglobin Concentration : 34.0 gm/dL  Auto Neutrophil # : 7.04 K/uL  Auto Lymphocyte # : 1.32 K/uL  Auto Monocyte # : 0.72 K/uL  Auto Eosinophil # : 0.03 K/uL  Auto Basophil # : 0.03 K/uL  Auto Neutrophil % : 74.1 %  Auto Lymphocyte % : 13.9 %  Auto Monocyte % : 7.6 %  Auto Eosinophil % : 0.3 %  Auto Basophil % : 0.3 %    04-27    145  |  108  |  10  ----------------------------<  148<H>  3.4<L>   |  36<H>  |  <0.20<L>    Ca    7.9<L>      27 Apr 2021 04:08  Phos  2.7     04-27  Mg     2.0     04-27    TPro  5.6<L>  /  Alb  1.7<L>  /  TBili  0.3  /  DBili  x   /  AST  25  /  ALT  20  /  AlkPhos  96  04-27            RADIOLOGY & ADDITIONAL STUDIES REVIEWED:  ***    [ ]GOALS OF CARE DISCUSSION WITH PATIENT/FAMILY/PROXY:    CRITICAL CARE TIME SPENT: 35 minutes   INTERVAL HPI/OVERNIGHT EVENTS: Patient's hemoglobin dropped again to 7, will give one unit of PRBC and will take patient to CTA abdomen to r/o any abdominal causes. Patient understands when given commands.    PRESSORS: [ ] YES [ x] NO    Antimicrobial:    Cardiovascular:  midodrine. 10 milliGRAM(s) Oral three times a day    Pulmonary:  ALBUTerol    90 MICROgram(s) HFA Inhaler 2 Puff(s) Inhalation every 6 hours PRN    Hematalogic:  aspirin  chewable 81 milliGRAM(s) Oral daily  enoxaparin Injectable 40 milliGRAM(s) SubCutaneous daily    Other:  artificial  tears Solution 1 Drop(s) Both EYES every 4 hours PRN  ascorbic acid 1000 milliGRAM(s) Oral daily  chlorhexidine 0.12% Liquid 15 milliLiter(s) Oral Mucosa every 12 hours  chlorhexidine 2% Cloths 1 Application(s) Topical <User Schedule>  dexMEDEtomidine Infusion 1 MICROgram(s)/kG/Hr IV Continuous <Continuous>  diazepam    Tablet 5 milliGRAM(s) Oral every 6 hours  fentaNYL   Infusion. 2.5 MICROgram(s)/kG/Hr IV Continuous <Continuous>  fentaNYL   Patch  25 MICROgram(s)/Hr. 1 Patch Transdermal every 48 hours  insulin lispro (ADMELOG) corrective regimen sliding scale   SubCutaneous every 6 hours  pantoprazole  Injectable 40 milliGRAM(s) IV Push every 12 hours  polyethylene glycol 3350 17 Gram(s) Oral daily  potassium chloride   Powder 40 milliEquivalent(s) Enteral Tube once  potassium chloride  10 mEq/100 mL IVPB 10 milliEquivalent(s) IV Intermittent every 1 hour  sodium chloride 0.9% lock flush 10 milliLiter(s) IV Push every 1 hour PRN    ALBUTerol    90 MICROgram(s) HFA Inhaler 2 Puff(s) Inhalation every 6 hours PRN  artificial  tears Solution 1 Drop(s) Both EYES every 4 hours PRN  ascorbic acid 1000 milliGRAM(s) Oral daily  aspirin  chewable 81 milliGRAM(s) Oral daily  chlorhexidine 0.12% Liquid 15 milliLiter(s) Oral Mucosa every 12 hours  chlorhexidine 2% Cloths 1 Application(s) Topical <User Schedule>  dexMEDEtomidine Infusion 1 MICROgram(s)/kG/Hr IV Continuous <Continuous>  diazepam    Tablet 5 milliGRAM(s) Oral every 6 hours  enoxaparin Injectable 40 milliGRAM(s) SubCutaneous daily  fentaNYL   Infusion. 2.5 MICROgram(s)/kG/Hr IV Continuous <Continuous>  fentaNYL   Patch  25 MICROgram(s)/Hr. 1 Patch Transdermal every 48 hours  insulin lispro (ADMELOG) corrective regimen sliding scale   SubCutaneous every 6 hours  midodrine. 10 milliGRAM(s) Oral three times a day  pantoprazole  Injectable 40 milliGRAM(s) IV Push every 12 hours  polyethylene glycol 3350 17 Gram(s) Oral daily  potassium chloride   Powder 40 milliEquivalent(s) Enteral Tube once  potassium chloride  10 mEq/100 mL IVPB 10 milliEquivalent(s) IV Intermittent every 1 hour  sodium chloride 0.9% lock flush 10 milliLiter(s) IV Push every 1 hour PRN    Drug Dosing Weight  Height (cm): 167.6 (23 Apr 2021 23:15)  Weight (kg): 83.9 (23 Apr 2021 23:15)  BMI (kg/m2): 29.9 (23 Apr 2021 23:15)  BSA (m2): 1.93 (23 Apr 2021 23:15)    CENTRAL LINE: [ ] YES [ ] NO  LOCATION:         RIVERA: [ ] YES [ ] NO          A-LINE:  [ ] YES [ ] NO  LOCATION:             ICU Vital Signs Last 24 Hrs  T(C): 37.3 (27 Apr 2021 08:00), Max: 37.6 (26 Apr 2021 11:00)  T(F): 99.1 (27 Apr 2021 08:00), Max: 99.7 (26 Apr 2021 11:00)  HR: 109 (27 Apr 2021 08:00) (65 - 121)  BP: --  BP(mean): --  ABP: 118/67 (27 Apr 2021 08:00) (85/51 - 146/80)  ABP(mean): 88 (27 Apr 2021 08:00) (65 - 109)  RR: 28 (27 Apr 2021 08:00) (16 - 35)  SpO2: 100% (27 Apr 2021 08:00) (90% - 100%)      ABG - ( 27 Apr 2021 04:24 )  pH, Arterial: 7.49  pH, Blood: x     /  pCO2: 60    /  pO2: 59    / HCO3: 46    / Base Excess: 18.9  /  SaO2: NR                    04-26 @ 07:01  -  04-27 @ 07:00  --------------------------------------------------------  IN: 1980.1 mL / OUT: 1215 mL / NET: 765.1 mL        Mode: AC/ CMV (Assist Control/ Continuous Mandatory Ventilation)  RR (machine): 28  TV (machine): 430  FiO2: 60  PEEP: 3  ITime: 1  MAP: 13  PIP: 40        PHYSICAL EXAM:    GENERAL: NAD, sat above 90%  EYES: EOMI, PERRLA  NECK: Supple, No JVD; Normal thyroid; Trachea midline: No LAD, trach    NERVOUS SYSTEM: sedated   CHEST/LUNG: No rales, rhonchi, wheezing, breath sounds present bilaterally  HEART: Regular rate and rhythm; No murmurs, no gallops  ABDOMEN: Soft, Nontender, Nondistended; Bowel sounds present, no pain or masses on palpation, G tube in place- functional, no signs of infection   : condom cath in place  EXTREMITIES:  2+ Peripheral Pulses, No clubbing or cyanosis. Bilateral upper extremity edema  SKIN: warm, intact, no lesions          LABS:  CBC Full  -  ( 27 Apr 2021 04:08 )  WBC Count : 9.50 K/uL  RBC Count : 1.88 M/uL  Hemoglobin : 7.0 g/dL  Hematocrit : 20.6 %  Platelet Count - Automated : 290 K/uL  Mean Cell Volume : 109.6 fl  Mean Cell Hemoglobin : 37.2 pg  Mean Cell Hemoglobin Concentration : 34.0 gm/dL  Auto Neutrophil # : 7.04 K/uL  Auto Lymphocyte # : 1.32 K/uL  Auto Monocyte # : 0.72 K/uL  Auto Eosinophil # : 0.03 K/uL  Auto Basophil # : 0.03 K/uL  Auto Neutrophil % : 74.1 %  Auto Lymphocyte % : 13.9 %  Auto Monocyte % : 7.6 %  Auto Eosinophil % : 0.3 %  Auto Basophil % : 0.3 %    04-27    145  |  108  |  10  ----------------------------<  148<H>  3.4<L>   |  36<H>  |  <0.20<L>    Ca    7.9<L>      27 Apr 2021 04:08  Phos  2.7     04-27  Mg     2.0     04-27    TPro  5.6<L>  /  Alb  1.7<L>  /  TBili  0.3  /  DBili  x   /  AST  25  /  ALT  20  /  AlkPhos  96  04-27            RADIOLOGY & ADDITIONAL STUDIES REVIEWED:  ***    [ ]GOALS OF CARE DISCUSSION WITH PATIENT/FAMILY/PROXY:    CRITICAL CARE TIME SPENT: 35 minutes   INTERVAL HPI/OVERNIGHT EVENTS: Patient's hemoglobin dropped again to 7, will give one unit of PRBC and will take patient to CTA abdomen to r/o any abdominal causes. Patient understands when given commands.    PRESSORS: [ ] YES [ x] NO    Antimicrobial:    Cardiovascular:  midodrine. 10 milliGRAM(s) Oral three times a day    Pulmonary:  ALBUTerol    90 MICROgram(s) HFA Inhaler 2 Puff(s) Inhalation every 6 hours PRN    Hematalogic:  aspirin  chewable 81 milliGRAM(s) Oral daily  enoxaparin Injectable 40 milliGRAM(s) SubCutaneous daily    Other:  artificial  tears Solution 1 Drop(s) Both EYES every 4 hours PRN  ascorbic acid 1000 milliGRAM(s) Oral daily  chlorhexidine 0.12% Liquid 15 milliLiter(s) Oral Mucosa every 12 hours  chlorhexidine 2% Cloths 1 Application(s) Topical <User Schedule>  dexMEDEtomidine Infusion 1 MICROgram(s)/kG/Hr IV Continuous <Continuous>  diazepam    Tablet 5 milliGRAM(s) Oral every 6 hours  fentaNYL   Infusion. 2.5 MICROgram(s)/kG/Hr IV Continuous <Continuous>  fentaNYL   Patch  25 MICROgram(s)/Hr. 1 Patch Transdermal every 48 hours  insulin lispro (ADMELOG) corrective regimen sliding scale   SubCutaneous every 6 hours  pantoprazole  Injectable 40 milliGRAM(s) IV Push every 12 hours  polyethylene glycol 3350 17 Gram(s) Oral daily  potassium chloride   Powder 40 milliEquivalent(s) Enteral Tube once  potassium chloride  10 mEq/100 mL IVPB 10 milliEquivalent(s) IV Intermittent every 1 hour  sodium chloride 0.9% lock flush 10 milliLiter(s) IV Push every 1 hour PRN    ALBUTerol    90 MICROgram(s) HFA Inhaler 2 Puff(s) Inhalation every 6 hours PRN  artificial  tears Solution 1 Drop(s) Both EYES every 4 hours PRN  ascorbic acid 1000 milliGRAM(s) Oral daily  aspirin  chewable 81 milliGRAM(s) Oral daily  chlorhexidine 0.12% Liquid 15 milliLiter(s) Oral Mucosa every 12 hours  chlorhexidine 2% Cloths 1 Application(s) Topical <User Schedule>  dexMEDEtomidine Infusion 1 MICROgram(s)/kG/Hr IV Continuous <Continuous>  diazepam    Tablet 5 milliGRAM(s) Oral every 6 hours  enoxaparin Injectable 40 milliGRAM(s) SubCutaneous daily  fentaNYL   Infusion. 2.5 MICROgram(s)/kG/Hr IV Continuous <Continuous>  fentaNYL   Patch  25 MICROgram(s)/Hr. 1 Patch Transdermal every 48 hours  insulin lispro (ADMELOG) corrective regimen sliding scale   SubCutaneous every 6 hours  midodrine. 10 milliGRAM(s) Oral three times a day  pantoprazole  Injectable 40 milliGRAM(s) IV Push every 12 hours  polyethylene glycol 3350 17 Gram(s) Oral daily  potassium chloride   Powder 40 milliEquivalent(s) Enteral Tube once  potassium chloride  10 mEq/100 mL IVPB 10 milliEquivalent(s) IV Intermittent every 1 hour  sodium chloride 0.9% lock flush 10 milliLiter(s) IV Push every 1 hour PRN    Drug Dosing Weight  Height (cm): 167.6 (23 Apr 2021 23:15)  Weight (kg): 83.9 (23 Apr 2021 23:15)  BMI (kg/m2): 29.9 (23 Apr 2021 23:15)  BSA (m2): 1.93 (23 Apr 2021 23:15)    CENTRAL LINE: [ ] YES [ ] NO  LOCATION:         RIVERA: [ ] YES [ ] NO          A-LINE:  [ ] YES [ ] NO  LOCATION:             ICU Vital Signs Last 24 Hrs  T(C): 37.3 (27 Apr 2021 08:00), Max: 37.6 (26 Apr 2021 11:00)  T(F): 99.1 (27 Apr 2021 08:00), Max: 99.7 (26 Apr 2021 11:00)  HR: 109 (27 Apr 2021 08:00) (65 - 121)  BP: --  BP(mean): --  ABP: 118/67 (27 Apr 2021 08:00) (85/51 - 146/80)  ABP(mean): 88 (27 Apr 2021 08:00) (65 - 109)  RR: 28 (27 Apr 2021 08:00) (16 - 35)  SpO2: 100% (27 Apr 2021 08:00) (90% - 100%)      ABG - ( 27 Apr 2021 04:24 )  pH, Arterial: 7.49  pH, Blood: x     /  pCO2: 60    /  pO2: 59    / HCO3: 46    / Base Excess: 18.9  /  SaO2: NR                    04-26 @ 07:01  -  04-27 @ 07:00  --------------------------------------------------------  IN: 1980.1 mL / OUT: 1215 mL / NET: 765.1 mL        Mode: AC/ CMV (Assist Control/ Continuous Mandatory Ventilation)  RR (machine): 28  TV (machine): 430  FiO2: 60  PEEP: 3  ITime: 1  MAP: 13  PIP: 40        PHYSICAL EXAM:    GENERAL: NAD, sat above 90%  EYES: EOMI, PERRLA  NECK: Supple, No JVD; Normal thyroid; Trachea midline: No LAD, trach    NERVOUS SYSTEM: sedated   CHEST/LUNG: No rales, rhonchi, wheezing, breath sounds present bilaterally  HEART: Regular rate and rhythm; No murmurs, no gallops  ABDOMEN: Soft, Nontender, Nondistended; Bowel sounds present, no pain or masses on palpation, G tube in place- functional, no signs of infection   : condom cath in place  EXTREMITIES:  2+ Peripheral Pulses, No clubbing or cyanosis. Bilateral upper extremity pitting edema  SKIN: warm, intact, no lesions          LABS:  CBC Full  -  ( 27 Apr 2021 04:08 )  WBC Count : 9.50 K/uL  RBC Count : 1.88 M/uL  Hemoglobin : 7.0 g/dL  Hematocrit : 20.6 %  Platelet Count - Automated : 290 K/uL  Mean Cell Volume : 109.6 fl  Mean Cell Hemoglobin : 37.2 pg  Mean Cell Hemoglobin Concentration : 34.0 gm/dL  Auto Neutrophil # : 7.04 K/uL  Auto Lymphocyte # : 1.32 K/uL  Auto Monocyte # : 0.72 K/uL  Auto Eosinophil # : 0.03 K/uL  Auto Basophil # : 0.03 K/uL  Auto Neutrophil % : 74.1 %  Auto Lymphocyte % : 13.9 %  Auto Monocyte % : 7.6 %  Auto Eosinophil % : 0.3 %  Auto Basophil % : 0.3 %    04-27    145  |  108  |  10  ----------------------------<  148<H>  3.4<L>   |  36<H>  |  <0.20<L>    Ca    7.9<L>      27 Apr 2021 04:08  Phos  2.7     04-27  Mg     2.0     04-27    TPro  5.6<L>  /  Alb  1.7<L>  /  TBili  0.3  /  DBili  x   /  AST  25  /  ALT  20  /  AlkPhos  96  04-27            RADIOLOGY & ADDITIONAL STUDIES REVIEWED:  ***    [ ]GOALS OF CARE DISCUSSION WITH PATIENT/FAMILY/PROXY:    CRITICAL CARE TIME SPENT: 35 minutes

## 2021-04-27 NOTE — PROGRESS NOTE ADULT - SUBJECTIVE AND OBJECTIVE BOX
Patient is a 55y old  Male who presents with a chief complaint of SOB (26 Apr 2021 10:56)    pt seen in icu [ x ], reg med floor [   ], bed [ x ], chair at bedside [   ], a+o x3 [  ], sedated [x  ],  nad [x  ]    andrea [ x ], g-tube feeding [x  ], tracheostomy tube [ x ], cent line [x  ],        Allergies    No Known Allergies        Vitals    T(F): 98.7 (04-27-21 @ 04:00), Max: 99.7 (04-26-21 @ 11:00)  HR: 100 (04-27-21 @ 05:00) (65 - 121)  BP: --  RR: 28 (04-27-21 @ 05:00) (16 - 35)  SpO2: 97% (04-27-21 @ 05:00) (90% - 100%)  Wt(kg): --  CAPILLARY BLOOD GLUCOSE      POCT Blood Glucose.: 159 mg/dL (27 Apr 2021 05:45)      Labs                          7.0    9.50  )-----------( 290      ( 27 Apr 2021 04:08 )             20.6       04-27    145  |  108  |  10  ----------------------------<  148<H>  3.4<L>   |  36<H>  |  <0.20<L>    Ca    7.9<L>      27 Apr 2021 04:08  Phos  2.7     04-27  Mg     2.0     04-27    TPro  5.6<L>  /  Alb  1.7<L>  /  TBili  0.3  /  DBili  x   /  AST  25  /  ALT  20  /  AlkPhos  96  04-27            .Sputum Sputum  04-16 @ 04:42   Moderate Enterobacter aerogenes (Carbapenem Resistant)  Normal Respiratory Monserrat present  --  Enterobacter aerogenes (Carbapenem Resistant)      .Urine Clean Catch (Midstream)  03-15 @ 00:52   No growth  --  --          Radiology Results      Meds    MEDICATIONS  (STANDING):  ascorbic acid 1000 milliGRAM(s) Oral daily  aspirin  chewable 81 milliGRAM(s) Oral daily  chlorhexidine 0.12% Liquid 15 milliLiter(s) Oral Mucosa every 12 hours  chlorhexidine 2% Cloths 1 Application(s) Topical <User Schedule>  dexMEDEtomidine Infusion 1 MICROgram(s)/kG/Hr (21 mL/Hr) IV Continuous <Continuous>  diazepam    Tablet 5 milliGRAM(s) Oral every 6 hours  enoxaparin Injectable 40 milliGRAM(s) SubCutaneous daily  fentaNYL   Infusion. 1 MICROgram(s)/kG/Hr (8.39 mL/Hr) IV Continuous <Continuous>  fentaNYL   Patch  25 MICROgram(s)/Hr. 1 Patch Transdermal every 48 hours  insulin lispro (ADMELOG) corrective regimen sliding scale   SubCutaneous every 6 hours  midodrine. 10 milliGRAM(s) Oral three times a day  pantoprazole  Injectable 40 milliGRAM(s) IV Push every 12 hours  polyethylene glycol 3350 17 Gram(s) Oral daily  potassium chloride    Tablet ER 40 milliEquivalent(s) Oral once      MEDICATIONS  (PRN):  ALBUTerol    90 MICROgram(s) HFA Inhaler 2 Puff(s) Inhalation every 6 hours PRN Shortness of Breath and/or Wheezing  artificial  tears Solution 1 Drop(s) Both EYES every 4 hours PRN Dry Eyes  sodium chloride 0.9% lock flush 10 milliLiter(s) IV Push every 1 hour PRN Pre/post blood products, medications, blood draw, and to maintain line patency      Physical Exam    Neuro :  no focal deficits  Respiratory: CTA B/L  CV: RRR, S1S2, no murmurs,   Abdominal: Soft, NT, ND +BS, g- tube in place, dressing cdi  Extremities: b/l ue edema, + peripheral pulses      ASSESSMENT    Hypoxemia 2nd to covid pna   transaminitis  prediabetes  h/o appendectomy  cholecystectomy        PLAN    contact and airborne isolation  d/c remdesevir given covid ab positive noted   completed dexamethasone   started pulse steroids for 3 days - 250mg solumedrol bid now tapered off 4/14/21  cont asa, vit c,    cont albuterol inhaler   pulm f/u  procalcitonin, D-dimer, crp, ldh, ferritin, lactate noted ,    tmx 99.9  cont tylenol prn,   cont robitussin prn   pt on fentanyl, precedex drip  s/p intubation 3/29/21   s/p tracheostomy 4/21/21  O2 sat  (87% - 100%) mech vent 50%  O2 via mech vent and taper fio2 as tolerated   vent mgmt as per icu  xray 3/19/21 with pneumomediastinum  rept cxr with New trace right apical pneumothorax. New mild left apical pneumothorax. Grossly stable small pneumomediastinum.  Soft tissue emphysema at the neck bases bilaterally. Grossly stable bilateral pulmonary infiltrates noted.   cxr 2/24 with No evidence of pneumothorax can be appreciated on the available image. This may be related to patient positioning. Evidence of pneumomediastinum and subcutaneous emphysema in the lower neck is again noted. There are patchy bibasilar infiltrates and elevated right hemidiaphragm noted.   cxr 3/29/21 with No significant change bilateral infiltrates. There is a small simple left apical pneumothorax. No significant pleural effusion. Bilateral subcutaneous emphysema similar to prior.   XRs on 7AM and 5PM with no obvious increase of ptx  cxr 4/4/21 with Improving bilateral airspace disease noted    cxr 4/8/21 with Small left pneumothorax noted.  thoracic surg f/u   s/p L chest tube replacement 4/18/21 for recurrence of left pneumothorax   cxr 4/20/21 with Unchanged advanced infiltrates and catheter left chest tube noted   CXR 4/11/21 with : No interval change compared to one day prior. No pneumothorax noted.   Daily CXR  Monitor O2 status   s/p tracheostomy 4/21/21   stay sutures out 2 weeks from OR date  cxr 4/21/21 with No evidence of active chest disease. Tracheostomy tube in place otherwise no significant change noted   cxr 4/25/21 with A 40% LEFT pneumothorax. LEFT multi-sidehole pigtail catheter overlies LEFT lower hemithorax.. Bilateral multifocal and diffuse ill-defined airspace opacities..  Follow-up AP portable chest radiograph 4/25/2021 AT 8:58 AM: Residual LEFT 30% upper zone pneumothorax. Otherwise no interval change.noted above..  s/p surgical placement of gastrostomy tube 4/23/2021.   pt started on tube feeding  id f/u   No need for further antibiotics as patient completed course of Meropenem  leukocytosis resolved  speutum cx with Enterobacter aerogenes (Carbapenem Resistant) noted above  andrea   lispro ss   prognosis poor  cont current meds  mgmt as per icu    Patient is a 55y old  Male who presents with a chief complaint of SOB (26 Apr 2021 10:56)    pt seen in icu [ x ], reg med floor [   ], bed [ x ], chair at bedside [   ], awake and responsive [x  ], sedated [  ],  nad [x  ]    andrea [ x ], g-tube feeding [x  ], tracheostomy tube [ x ], cent line [x  ],        Allergies    No Known Allergies        Vitals    T(F): 98.7 (04-27-21 @ 04:00), Max: 99.7 (04-26-21 @ 11:00)  HR: 100 (04-27-21 @ 05:00) (65 - 121)  BP: --  RR: 28 (04-27-21 @ 05:00) (16 - 35)  SpO2: 97% (04-27-21 @ 05:00) (90% - 100%)  Wt(kg): --  CAPILLARY BLOOD GLUCOSE      POCT Blood Glucose.: 159 mg/dL (27 Apr 2021 05:45)      Labs                          7.0    9.50  )-----------( 290      ( 27 Apr 2021 04:08 )             20.6       04-27    145  |  108  |  10  ----------------------------<  148<H>  3.4<L>   |  36<H>  |  <0.20<L>    Ca    7.9<L>      27 Apr 2021 04:08  Phos  2.7     04-27  Mg     2.0     04-27    TPro  5.6<L>  /  Alb  1.7<L>  /  TBili  0.3  /  DBili  x   /  AST  25  /  ALT  20  /  AlkPhos  96  04-27            .Sputum Sputum  04-16 @ 04:42   Moderate Enterobacter aerogenes (Carbapenem Resistant)  Normal Respiratory Monserrat present  --  Enterobacter aerogenes (Carbapenem Resistant)      .Urine Clean Catch (Midstream)  03-15 @ 00:52   No growth  --  --          Radiology Results      Meds    MEDICATIONS  (STANDING):  ascorbic acid 1000 milliGRAM(s) Oral daily  aspirin  chewable 81 milliGRAM(s) Oral daily  chlorhexidine 0.12% Liquid 15 milliLiter(s) Oral Mucosa every 12 hours  chlorhexidine 2% Cloths 1 Application(s) Topical <User Schedule>  dexMEDEtomidine Infusion 1 MICROgram(s)/kG/Hr (21 mL/Hr) IV Continuous <Continuous>  diazepam    Tablet 5 milliGRAM(s) Oral every 6 hours  enoxaparin Injectable 40 milliGRAM(s) SubCutaneous daily  fentaNYL   Infusion. 1 MICROgram(s)/kG/Hr (8.39 mL/Hr) IV Continuous <Continuous>  fentaNYL   Patch  25 MICROgram(s)/Hr. 1 Patch Transdermal every 48 hours  insulin lispro (ADMELOG) corrective regimen sliding scale   SubCutaneous every 6 hours  midodrine. 10 milliGRAM(s) Oral three times a day  pantoprazole  Injectable 40 milliGRAM(s) IV Push every 12 hours  polyethylene glycol 3350 17 Gram(s) Oral daily  potassium chloride    Tablet ER 40 milliEquivalent(s) Oral once      MEDICATIONS  (PRN):  ALBUTerol    90 MICROgram(s) HFA Inhaler 2 Puff(s) Inhalation every 6 hours PRN Shortness of Breath and/or Wheezing  artificial  tears Solution 1 Drop(s) Both EYES every 4 hours PRN Dry Eyes  sodium chloride 0.9% lock flush 10 milliLiter(s) IV Push every 1 hour PRN Pre/post blood products, medications, blood draw, and to maintain line patency      Physical Exam    Neuro :  no focal deficits  Respiratory: CTA B/L  CV: RRR, S1S2, no murmurs,   Abdominal: Soft, NT, ND +BS, g- tube in place, dressing cdi  Extremities: b/l ue edema, + peripheral pulses      ASSESSMENT    Hypoxemia 2nd to covid pna   transaminitis  prediabetes  h/o appendectomy  cholecystectomy        PLAN    contact and airborne isolation  d/c remdesevir given covid ab positive noted   completed dexamethasone   started pulse steroids for 3 days - 250mg solumedrol bid now tapered off 4/14/21  cont asa, vit c,    cont albuterol inhaler   pulm f/u  procalcitonin, D-dimer, crp, ldh, ferritin, lactate noted ,    tmx 99.9  cont tylenol prn,   cont robitussin prn   pt on fentanyl, precedex drip  s/p intubation 3/29/21   s/p tracheostomy 4/21/21  O2 sat  (90% - 100%) mech vent 60%  O2 via mech vent and taper fio2 as tolerated   vent mgmt as per icu  xray 3/19/21 with pneumomediastinum  rept cxr with New trace right apical pneumothorax. New mild left apical pneumothorax. Grossly stable small pneumomediastinum.  Soft tissue emphysema at the neck bases bilaterally. Grossly stable bilateral pulmonary infiltrates noted.   cxr 2/24 with No evidence of pneumothorax can be appreciated on the available image. This may be related to patient positioning. Evidence of pneumomediastinum and subcutaneous emphysema in the lower neck is again noted. There are patchy bibasilar infiltrates and elevated right hemidiaphragm noted.   cxr 3/29/21 with No significant change bilateral infiltrates. There is a small simple left apical pneumothorax. No significant pleural effusion. Bilateral subcutaneous emphysema similar to prior.   XRs on 7AM and 5PM with no obvious increase of ptx  cxr 4/4/21 with Improving bilateral airspace disease noted    cxr 4/8/21 with Small left pneumothorax noted.  thoracic surg f/u   s/p L chest tube replacement 4/18/21 for recurrence of left pneumothorax   cxr 4/20/21 with Unchanged advanced infiltrates and catheter left chest tube noted   CXR 4/11/21 with : No interval change compared to one day prior. No pneumothorax noted.   Daily CXR  Monitor O2 status   s/p tracheostomy 4/21/21   stay sutures out 2 weeks from OR date  cxr 4/21/21 with No evidence of active chest disease. Tracheostomy tube in place otherwise no significant change noted   cxr 4/25/21 with A 40% LEFT pneumothorax. LEFT multi-sidehole pigtail catheter overlies LEFT lower hemithorax.. Bilateral multifocal and diffuse ill-defined airspace opacities..  Follow-up AP portable chest radiograph 4/25/2021 AT 8:58 AM: Residual LEFT 30% upper zone pneumothorax. Otherwise no interval change.noted above..  s/p surgical placement of gastrostomy tube 4/23/2021.   pt started on tube feeding  id f/u   No need for further antibiotics as patient completed course of Meropenem  leukocytosis resolved  speutum cx with Enterobacter aerogenes (Carbapenem Resistant) noted above  andrea   rectal tube  lispro ss   prognosis poor  cont current meds  mgmt as per icu    Patient is a 55y old  Male who presents with a chief complaint of SOB (26 Apr 2021 10:56)    pt seen in icu [ x ], reg med floor [   ], bed [ x ], chair at bedside [   ], awake and responsive [x  ], sedated [  ],  nad [x  ]    andrea [ x ], g-tube feeding [x  ], tracheostomy tube [ x ], cent line [x  ],        Allergies    No Known Allergies        Vitals    T(F): 98.7 (04-27-21 @ 04:00), Max: 99.7 (04-26-21 @ 11:00)  HR: 100 (04-27-21 @ 05:00) (65 - 121)  BP: --  RR: 28 (04-27-21 @ 05:00) (16 - 35)  SpO2: 97% (04-27-21 @ 05:00) (90% - 100%)  Wt(kg): --  CAPILLARY BLOOD GLUCOSE      POCT Blood Glucose.: 159 mg/dL (27 Apr 2021 05:45)      Labs                          7.0    9.50  )-----------( 290      ( 27 Apr 2021 04:08 )             20.6       04-27    145  |  108  |  10  ----------------------------<  148<H>  3.4<L>   |  36<H>  |  <0.20<L>    Ca    7.9<L>      27 Apr 2021 04:08  Phos  2.7     04-27  Mg     2.0     04-27    TPro  5.6<L>  /  Alb  1.7<L>  /  TBili  0.3  /  DBili  x   /  AST  25  /  ALT  20  /  AlkPhos  96  04-27            .Sputum Sputum  04-16 @ 04:42   Moderate Enterobacter aerogenes (Carbapenem Resistant)  Normal Respiratory Monserrat present  --  Enterobacter aerogenes (Carbapenem Resistant)      .Urine Clean Catch (Midstream)  03-15 @ 00:52   No growth  --  --          Radiology Results      Meds    MEDICATIONS  (STANDING):  ascorbic acid 1000 milliGRAM(s) Oral daily  aspirin  chewable 81 milliGRAM(s) Oral daily  chlorhexidine 0.12% Liquid 15 milliLiter(s) Oral Mucosa every 12 hours  chlorhexidine 2% Cloths 1 Application(s) Topical <User Schedule>  dexMEDEtomidine Infusion 1 MICROgram(s)/kG/Hr (21 mL/Hr) IV Continuous <Continuous>  diazepam    Tablet 5 milliGRAM(s) Oral every 6 hours  enoxaparin Injectable 40 milliGRAM(s) SubCutaneous daily  fentaNYL   Infusion. 1 MICROgram(s)/kG/Hr (8.39 mL/Hr) IV Continuous <Continuous>  fentaNYL   Patch  25 MICROgram(s)/Hr. 1 Patch Transdermal every 48 hours  insulin lispro (ADMELOG) corrective regimen sliding scale   SubCutaneous every 6 hours  midodrine. 10 milliGRAM(s) Oral three times a day  pantoprazole  Injectable 40 milliGRAM(s) IV Push every 12 hours  polyethylene glycol 3350 17 Gram(s) Oral daily  potassium chloride    Tablet ER 40 milliEquivalent(s) Oral once      MEDICATIONS  (PRN):  ALBUTerol    90 MICROgram(s) HFA Inhaler 2 Puff(s) Inhalation every 6 hours PRN Shortness of Breath and/or Wheezing  artificial  tears Solution 1 Drop(s) Both EYES every 4 hours PRN Dry Eyes  sodium chloride 0.9% lock flush 10 milliLiter(s) IV Push every 1 hour PRN Pre/post blood products, medications, blood draw, and to maintain line patency      Physical Exam    Neuro :  no focal deficits  Respiratory: CTA B/L  CV: RRR, S1S2, no murmurs,   Abdominal: Soft, NT, ND +BS, g- tube in place, dressing cdi  Extremities: b/l ue edema, + peripheral pulses      ASSESSMENT    Hypoxemia 2nd to covid pna   transaminitis  prediabetes  h/o appendectomy  cholecystectomy        PLAN    contact and airborne isolation  d/c remdesevir given covid ab positive noted   completed dexamethasone   started pulse steroids for 3 days - 250mg solumedrol bid now tapered off 4/14/21  cont asa, vit c,    cont albuterol inhaler   pulm f/u  procalcitonin, D-dimer, crp, ldh, ferritin, lactate noted ,    tmx 99.7  cont tylenol prn,   cont robitussin prn   pt on fentanyl, precedex drip  s/p intubation 3/29/21   s/p tracheostomy 4/21/21  O2 sat  (90% - 100%) mech vent 60%  O2 via mech vent and taper fio2 as tolerated   vent mgmt as per icu  xray 3/19/21 with pneumomediastinum  rept cxr with New trace right apical pneumothorax. New mild left apical pneumothorax. Grossly stable small pneumomediastinum.  Soft tissue emphysema at the neck bases bilaterally. Grossly stable bilateral pulmonary infiltrates noted.   cxr 2/24 with No evidence of pneumothorax can be appreciated on the available image. This may be related to patient positioning. Evidence of pneumomediastinum and subcutaneous emphysema in the lower neck is again noted. There are patchy bibasilar infiltrates and elevated right hemidiaphragm noted.   cxr 3/29/21 with No significant change bilateral infiltrates. There is a small simple left apical pneumothorax. No significant pleural effusion. Bilateral subcutaneous emphysema similar to prior.   XRs on 7AM and 5PM with no obvious increase of ptx  cxr 4/4/21 with Improving bilateral airspace disease noted    cxr 4/8/21 with Small left pneumothorax noted.  thoracic surg f/u   s/p L chest tube replacement 4/18/21 for recurrence of left pneumothorax   cxr 4/20/21 with Unchanged advanced infiltrates and catheter left chest tube noted   CXR 4/11/21 with : No interval change compared to one day prior. No pneumothorax noted.   Daily CXR  Monitor O2 status   s/p tracheostomy 4/21/21   stay sutures out 2 weeks from OR date  cxr 4/21/21 with No evidence of active chest disease. Tracheostomy tube in place otherwise no significant change noted   cxr 4/25/21 with A 40% LEFT pneumothorax. LEFT multi-sidehole pigtail catheter overlies LEFT lower hemithorax.. Bilateral multifocal and diffuse ill-defined airspace opacities..  Follow-up AP portable chest radiograph 4/25/2021 AT 8:58 AM: Residual LEFT 30% upper zone pneumothorax. Otherwise no interval change.noted above..  s/p surgical placement of gastrostomy tube 4/23/2021.   pt started on tube feeding  id f/u   No need for further antibiotics as patient completed course of Meropenem  leukocytosis resolved  speutum cx with Enterobacter aerogenes (Carbapenem Resistant) noted above  andrea   rectal tube  lispro ss   prognosis poor  cont current meds  mgmt as per icu

## 2021-04-27 NOTE — PROGRESS NOTE ADULT - SUBJECTIVE AND OBJECTIVE BOX
Patient is a 55y old  Male who presents with a chief complaint of SOB (27 Apr 2021 07:14)  Patient is awake, not alert, laying in bed in NAD despite some tachypnea. S/p trach and Peg. Sat 99% on 60% FIO2.    INTERVAL HPI/OVERNIGHT EVENTS:      VITAL SIGNS:  T(F): 98.3 (04-27-21 @ 09:00)  HR: 97 (04-27-21 @ 10:00)  BP: --  RR: 28 (04-27-21 @ 10:00)  SpO2: 99% (04-27-21 @ 10:00)  Wt(kg): --  I&O's Detail    26 Apr 2021 07:01  -  27 Apr 2021 07:00  --------------------------------------------------------  IN:    Dexmedetomidine: 483 mL    Enteral Tube Flush: 60 mL    FentaNYL: 363.1 mL    FentaNYL: 42 mL    FentaNYL: 42 mL    Glucerna 1.5: 680 mL    PRBCs (Packed Red Blood Cells): 350 mL  Total IN: 2020.1 mL    OUT:    Chest Tube (mL): 100 mL    Incontinent per Condom Catheter (mL): 1115 mL  Total OUT: 1215 mL    Total NET: 805.1 mL      27 Apr 2021 07:01  -  27 Apr 2021 10:45  --------------------------------------------------------  IN:    Dexmedetomidine: 63 mL    FentaNYL: 63 mL    Glucerna 1.5: 80 mL    IV PiggyBack: 200 mL  Total IN: 406 mL    OUT:  Total OUT: 0 mL    Total NET: 406 mL        Mode: AC/ CMV (Assist Control/ Continuous Mandatory Ventilation)  RR (machine): 28  TV (machine): 430  FiO2: 60  PEEP: 3  ITime: 1  MAP: 11  PIP: 37        REVIEW OF SYSTEMS:    CONSTITUTIONAL:  No fevers, chills, sweats    HEENT:  Eyes:  No diplopia or blurred vision. ENT:  No earache, sore throat or runny nose.    CARDIOVASCULAR:  No pressure, squeezing, tightness, or heaviness about the chest; no palpitations.    RESPIRATORY:  Per HPI    GASTROINTESTINAL:  No abdominal pain, nausea, vomiting or diarrhea.    GENITOURINARY:  No dysuria, frequency or urgency.    NEUROLOGIC:  No paresthesias, fasciculations, seizures or weakness.    PSYCHIATRIC:  No disorder of thought or mood.      PHYSICAL EXAM:    Constitutional: Well developed and nourished  Eyes:Perrla  ENMT: normal  Neck:supple  Respiratory: good air entry  Cardiovascular: S1 S2 regular  Gastrointestinal: Soft, Non tender  Extremities: No edema  Vascular:normal  Neurological:Awake, not alert  Musculoskeletal:Normal      MEDICATIONS  (STANDING):  ascorbic acid 1000 milliGRAM(s) Oral daily  chlorhexidine 0.12% Liquid 15 milliLiter(s) Oral Mucosa every 12 hours  chlorhexidine 2% Cloths 1 Application(s) Topical <User Schedule>  dexMEDEtomidine Infusion 1 MICROgram(s)/kG/Hr (21 mL/Hr) IV Continuous <Continuous>  diazepam    Tablet 5 milliGRAM(s) Oral every 6 hours  fentaNYL   Infusion. 2.5 MICROgram(s)/kG/Hr (21 mL/Hr) IV Continuous <Continuous>  fentaNYL   Patch  25 MICROgram(s)/Hr. 1 Patch Transdermal every 48 hours  insulin lispro (ADMELOG) corrective regimen sliding scale   SubCutaneous every 6 hours  OLANZapine 2.5 milliGRAM(s) Oral two times a day  pantoprazole  Injectable 40 milliGRAM(s) IV Push every 12 hours    MEDICATIONS  (PRN):  ALBUTerol    90 MICROgram(s) HFA Inhaler 2 Puff(s) Inhalation every 6 hours PRN Shortness of Breath and/or Wheezing  artificial  tears Solution 1 Drop(s) Both EYES every 4 hours PRN Dry Eyes  sodium chloride 0.9% lock flush 10 milliLiter(s) IV Push every 1 hour PRN Pre/post blood products, medications, blood draw, and to maintain line patency      Allergies    No Known Allergies    Intolerances        LABS:                        7.0    9.50  )-----------( 290      ( 27 Apr 2021 04:08 )             20.6     04-27    145  |  108  |  10  ----------------------------<  148<H>  3.4<L>   |  36<H>  |  <0.20<L>    Ca    7.9<L>      27 Apr 2021 04:08  Phos  2.7     04-27  Mg     2.0     04-27    TPro  5.6<L>  /  Alb  1.7<L>  /  TBili  0.3  /  DBili  x   /  AST  25  /  ALT  20  /  AlkPhos  96  04-27        ABG - ( 27 Apr 2021 04:24 )  pH, Arterial: 7.49  pH, Blood: x     /  pCO2: 60    /  pO2: 59    / HCO3: 46    / Base Excess: 18.9  /  SaO2: NR                    CAPILLARY BLOOD GLUCOSE      POCT Blood Glucose.: 159 mg/dL (27 Apr 2021 05:45)  POCT Blood Glucose.: 171 mg/dL (26 Apr 2021 23:54)  POCT Blood Glucose.: 156 mg/dL (26 Apr 2021 17:10)  POCT Blood Glucose.: 122 mg/dL (26 Apr 2021 12:01)    pro-bnp -- 04-26 @ 05:42     d-dimer 1434  04-26 @ 05:42  pro-bnp -- 04-23 @ 06:21     d-dimer 1097  04-23 @ 06:21  pro-bnp -- 04-22 @ 03:55     d-dimer 1367  04-22 @ 03:55  pro-bnp -- 04-21 @ 03:58     d-dimer 2160  04-21 @ 03:58      RADIOLOGY & ADDITIONAL TESTS:    CXR:  < from: Xray Chest 1 View- PORTABLE-Routine (Xray Chest 1 View- PORTABLE-Routine in AM.) (04.26.21 @ 07:49) >  IMPRESSION: No interval change. Unchanged LEFT 40% pneumothorax.    < end of copied text >    Ct scan chest:    ekg;    echo:

## 2021-04-27 NOTE — CHART NOTE - NSCHARTNOTEFT_GEN_A_CORE
Called by ICU with concerns for tachycardia and tachypnea since pt went down to get CT scan at 6pm.  Pt with H/H 7.1/20.7, with no obvious source of active bleeding.   Pts left chest pigtail catheter flushed at bedside, flowing well, placed back to wall suction by ICU: small air leak noted; CT reviewed             LABS:             7.1    12.43 )-----------( 331      ( 27 Apr 2021 16:10 )             20.7    Investigate for source of bleeding, transfuse as needed  Continue pigtail to wall suction  Continue care per ICU  Discussed with Dr. Lanier

## 2021-04-27 NOTE — PROGRESS NOTE ADULT - ATTENDING COMMENTS
55 yr old  man , non smoker with  moody 1990s presented 3/14 with x9 days worsening cough, subjective fevers, and SOB, with x2-3 days dysuria and central, non-radiating, constant CP. Admitted to medicine unit  for acute hypoxic respiratory failure secondary to pna from covid-19 infection .     Assessment:  1. Acute hypoxic respiratory failure  2. Covid-19 infection   3. Transaminitis  4. Prediabetes  5. Bilateral pneumothorax  6. Septic shock - resolved  7. Anemia    Plan   -Continues to drop hgb   -Obtain CTA of the abdomen to evaluate for possible RP bleeding   -Transfuse to hgb > 7  -S/p tracheostomy placement 4/21   - S/p G-tube 4/23  -Continue tube feedings  -Completed antibiotics  -thoracic f/u noted   -Cont. mechanical ventilation   -Monitor chest tube output, keep to suction as patient with residual pneumothroax  -Fentanyl path  -Cont. midodrine  -monitor biomarkers daily, trending down   -dvt/gi prophy  -hemodynamic monitoring   -Prognosis is guarded

## 2021-04-28 LAB
ALBUMIN SERPL ELPH-MCNC: 1.7 G/DL — LOW (ref 3.5–5)
ALBUMIN SERPL ELPH-MCNC: 1.7 G/DL — LOW (ref 3.5–5)
ALP SERPL-CCNC: 78 U/L — SIGNIFICANT CHANGE UP (ref 40–120)
ALP SERPL-CCNC: 86 U/L — SIGNIFICANT CHANGE UP (ref 40–120)
ALT FLD-CCNC: 17 U/L DA — SIGNIFICANT CHANGE UP (ref 10–60)
ALT FLD-CCNC: 18 U/L DA — SIGNIFICANT CHANGE UP (ref 10–60)
ANION GAP SERPL CALC-SCNC: 2 MMOL/L — LOW (ref 5–17)
ANION GAP SERPL CALC-SCNC: 2 MMOL/L — LOW (ref 5–17)
ANISOCYTOSIS BLD QL: SIGNIFICANT CHANGE UP
AST SERPL-CCNC: 20 U/L — SIGNIFICANT CHANGE UP (ref 10–40)
AST SERPL-CCNC: 20 U/L — SIGNIFICANT CHANGE UP (ref 10–40)
BASE EXCESS BLDA CALC-SCNC: 17.9 MMOL/L — HIGH (ref -2–3)
BILIRUB SERPL-MCNC: 0.5 MG/DL — SIGNIFICANT CHANGE UP (ref 0.2–1.2)
BILIRUB SERPL-MCNC: 0.6 MG/DL — SIGNIFICANT CHANGE UP (ref 0.2–1.2)
BLD GP AB SCN SERPL QL: SIGNIFICANT CHANGE UP
BLOOD GAS COMMENTS ARTERIAL: SIGNIFICANT CHANGE UP
BUN SERPL-MCNC: 5 MG/DL — LOW (ref 7–18)
BUN SERPL-MCNC: 6 MG/DL — LOW (ref 7–18)
CALCIUM SERPL-MCNC: 7.9 MG/DL — LOW (ref 8.4–10.5)
CALCIUM SERPL-MCNC: 8.1 MG/DL — LOW (ref 8.4–10.5)
CHLORIDE SERPL-SCNC: 105 MMOL/L — SIGNIFICANT CHANGE UP (ref 96–108)
CHLORIDE SERPL-SCNC: 106 MMOL/L — SIGNIFICANT CHANGE UP (ref 96–108)
CO2 SERPL-SCNC: 36 MMOL/L — HIGH (ref 22–31)
CO2 SERPL-SCNC: 37 MMOL/L — HIGH (ref 22–31)
CREAT SERPL-MCNC: <0.2 MG/DL — LOW (ref 0.5–1.3)
CREAT SERPL-MCNC: <0.2 MG/DL — LOW (ref 0.5–1.3)
GLUCOSE BLDC GLUCOMTR-MCNC: 106 MG/DL — HIGH (ref 70–99)
GLUCOSE BLDC GLUCOMTR-MCNC: 122 MG/DL — HIGH (ref 70–99)
GLUCOSE BLDC GLUCOMTR-MCNC: 97 MG/DL — SIGNIFICANT CHANGE UP (ref 70–99)
GLUCOSE SERPL-MCNC: 87 MG/DL — SIGNIFICANT CHANGE UP (ref 70–99)
GLUCOSE SERPL-MCNC: 93 MG/DL — SIGNIFICANT CHANGE UP (ref 70–99)
HCO3 BLDA-SCNC: 44 MMOL/L — HIGH (ref 21–28)
HCT VFR BLD CALC: 19.9 % — CRITICAL LOW (ref 39–50)
HCT VFR BLD CALC: 23.2 % — LOW (ref 39–50)
HGB BLD-MCNC: 6.8 G/DL — CRITICAL LOW (ref 13–17)
HGB BLD-MCNC: 7.1 G/DL — LOW (ref 13–17)
HOROWITZ INDEX BLDA+IHG-RTO: 60 — SIGNIFICANT CHANGE UP
INR BLD: 1.27 RATIO — HIGH (ref 0.88–1.16)
LDH SERPL L TO P-CCNC: 235 U/L — HIGH (ref 120–225)
MACROCYTES BLD QL: SIGNIFICANT CHANGE UP
MAGNESIUM SERPL-MCNC: 1.7 MG/DL — SIGNIFICANT CHANGE UP (ref 1.6–2.6)
MAGNESIUM SERPL-MCNC: 1.8 MG/DL — SIGNIFICANT CHANGE UP (ref 1.6–2.6)
MANUAL SMEAR VERIFICATION: SIGNIFICANT CHANGE UP
MCHC RBC-ENTMCNC: 30.6 GM/DL — LOW (ref 32–36)
MCHC RBC-ENTMCNC: 32.1 PG — SIGNIFICANT CHANGE UP (ref 27–34)
MCHC RBC-ENTMCNC: 34.2 GM/DL — SIGNIFICANT CHANGE UP (ref 32–36)
MCHC RBC-ENTMCNC: 37.6 PG — HIGH (ref 27–34)
MCV RBC AUTO: 105 FL — HIGH (ref 80–100)
MCV RBC AUTO: 109.9 FL — HIGH (ref 80–100)
MICROCYTES BLD QL: SLIGHT — SIGNIFICANT CHANGE UP
NRBC # BLD: 0 /100 WBCS — SIGNIFICANT CHANGE UP (ref 0–0)
NRBC # BLD: 0 /100 WBCS — SIGNIFICANT CHANGE UP (ref 0–0)
PCO2 BLDA: 55 MMHG — HIGH (ref 35–48)
PH BLDA: 7.51 — HIGH (ref 7.35–7.45)
PHOSPHATE SERPL-MCNC: 2.7 MG/DL — SIGNIFICANT CHANGE UP (ref 2.5–4.5)
PHOSPHATE SERPL-MCNC: 2.9 MG/DL — SIGNIFICANT CHANGE UP (ref 2.5–4.5)
PLAT MORPH BLD: NORMAL — SIGNIFICANT CHANGE UP
PLATELET # BLD AUTO: 311 K/UL — SIGNIFICANT CHANGE UP (ref 150–400)
PLATELET # BLD AUTO: 351 K/UL — SIGNIFICANT CHANGE UP (ref 150–400)
PO2 BLDA: 73 MMHG — LOW (ref 83–108)
POIKILOCYTOSIS BLD QL AUTO: SIGNIFICANT CHANGE UP
POLYCHROMASIA BLD QL SMEAR: SLIGHT — SIGNIFICANT CHANGE UP
POTASSIUM SERPL-MCNC: 3.7 MMOL/L — SIGNIFICANT CHANGE UP (ref 3.5–5.3)
POTASSIUM SERPL-MCNC: 3.8 MMOL/L — SIGNIFICANT CHANGE UP (ref 3.5–5.3)
POTASSIUM SERPL-SCNC: 3.7 MMOL/L — SIGNIFICANT CHANGE UP (ref 3.5–5.3)
POTASSIUM SERPL-SCNC: 3.8 MMOL/L — SIGNIFICANT CHANGE UP (ref 3.5–5.3)
PROT SERPL-MCNC: 5.3 G/DL — LOW (ref 6–8.3)
PROT SERPL-MCNC: 5.8 G/DL — LOW (ref 6–8.3)
PROTHROM AB SERPL-ACNC: 14.9 SEC — HIGH (ref 10.6–13.6)
RBC # BLD: 1.78 M/UL — LOW (ref 4.2–5.8)
RBC # BLD: 1.81 M/UL — LOW (ref 4.2–5.8)
RBC # BLD: 2.21 M/UL — LOW (ref 4.2–5.8)
RBC # FLD: 18.4 % — HIGH (ref 10.3–14.5)
RBC # FLD: 19 % — HIGH (ref 10.3–14.5)
RBC BLD AUTO: ABNORMAL
RETICS #: 199 K/UL — HIGH (ref 25–125)
RETICS/RBC NFR: 10.3 % — HIGH (ref 0.5–2.5)
SAO2 % BLDA: SIGNIFICANT CHANGE UP %
SODIUM SERPL-SCNC: 143 MMOL/L — SIGNIFICANT CHANGE UP (ref 135–145)
SODIUM SERPL-SCNC: 145 MMOL/L — SIGNIFICANT CHANGE UP (ref 135–145)
WBC # BLD: 9.49 K/UL — SIGNIFICANT CHANGE UP (ref 3.8–10.5)
WBC # BLD: 9.91 K/UL — SIGNIFICANT CHANGE UP (ref 3.8–10.5)
WBC # FLD AUTO: 9.49 K/UL — SIGNIFICANT CHANGE UP (ref 3.8–10.5)
WBC # FLD AUTO: 9.91 K/UL — SIGNIFICANT CHANGE UP (ref 3.8–10.5)

## 2021-04-28 PROCEDURE — 93971 EXTREMITY STUDY: CPT | Mod: 26,RT

## 2021-04-28 PROCEDURE — 71045 X-RAY EXAM CHEST 1 VIEW: CPT | Mod: 26

## 2021-04-28 RX ORDER — FENTANYL CITRATE 50 UG/ML
1 INJECTION INTRAVENOUS
Refills: 0 | Status: DISCONTINUED | OUTPATIENT
Start: 2021-04-28 | End: 2021-04-30

## 2021-04-28 RX ORDER — DEXMEDETOMIDINE HYDROCHLORIDE IN 0.9% SODIUM CHLORIDE 4 UG/ML
0.2 INJECTION INTRAVENOUS
Qty: 400 | Refills: 0 | Status: DISCONTINUED | OUTPATIENT
Start: 2021-04-28 | End: 2021-04-28

## 2021-04-28 RX ORDER — SODIUM CHLORIDE 9 MG/ML
500 INJECTION, SOLUTION INTRAVENOUS ONCE
Refills: 0 | Status: DISCONTINUED | OUTPATIENT
Start: 2021-04-28 | End: 2021-04-28

## 2021-04-28 RX ORDER — FENTANYL CITRATE 50 UG/ML
0.5 INJECTION INTRAVENOUS
Qty: 2500 | Refills: 0 | Status: DISCONTINUED | OUTPATIENT
Start: 2021-04-28 | End: 2021-04-30

## 2021-04-28 RX ORDER — DEXMEDETOMIDINE HYDROCHLORIDE IN 0.9% SODIUM CHLORIDE 4 UG/ML
0.2 INJECTION INTRAVENOUS
Qty: 400 | Refills: 0 | Status: DISCONTINUED | OUTPATIENT
Start: 2021-04-28 | End: 2021-05-12

## 2021-04-28 RX ORDER — SODIUM CHLORIDE 9 MG/ML
500 INJECTION INTRAMUSCULAR; INTRAVENOUS; SUBCUTANEOUS ONCE
Refills: 0 | Status: DISCONTINUED | OUTPATIENT
Start: 2021-04-28 | End: 2021-04-28

## 2021-04-28 RX ORDER — ACETAMINOPHEN 500 MG
1000 TABLET ORAL ONCE
Refills: 0 | Status: COMPLETED | OUTPATIENT
Start: 2021-04-28 | End: 2021-04-28

## 2021-04-28 RX ORDER — MIDAZOLAM HYDROCHLORIDE 1 MG/ML
4 INJECTION, SOLUTION INTRAMUSCULAR; INTRAVENOUS ONCE
Refills: 0 | Status: DISCONTINUED | OUTPATIENT
Start: 2021-04-28 | End: 2021-04-28

## 2021-04-28 RX ORDER — FENTANYL CITRATE 50 UG/ML
1 INJECTION INTRAVENOUS
Refills: 0 | Status: DISCONTINUED | OUTPATIENT
Start: 2021-04-28 | End: 2021-04-28

## 2021-04-28 RX ADMIN — CHLORHEXIDINE GLUCONATE 15 MILLILITER(S): 213 SOLUTION TOPICAL at 17:58

## 2021-04-28 RX ADMIN — FENTANYL CITRATE 1 PATCH: 50 INJECTION INTRAVENOUS at 13:56

## 2021-04-28 RX ADMIN — Medication 5 MILLIGRAM(S): at 05:32

## 2021-04-28 RX ADMIN — DEXMEDETOMIDINE HYDROCHLORIDE IN 0.9% SODIUM CHLORIDE 4.2 MICROGRAM(S)/KG/HR: 4 INJECTION INTRAVENOUS at 20:09

## 2021-04-28 RX ADMIN — DEXMEDETOMIDINE HYDROCHLORIDE IN 0.9% SODIUM CHLORIDE 4.2 MICROGRAM(S)/KG/HR: 4 INJECTION INTRAVENOUS at 23:56

## 2021-04-28 RX ADMIN — DEXMEDETOMIDINE HYDROCHLORIDE IN 0.9% SODIUM CHLORIDE 4.2 MICROGRAM(S)/KG/HR: 4 INJECTION INTRAVENOUS at 11:43

## 2021-04-28 RX ADMIN — FENTANYL CITRATE 1 PATCH: 50 INJECTION INTRAVENOUS at 08:52

## 2021-04-28 RX ADMIN — DEXMEDETOMIDINE HYDROCHLORIDE IN 0.9% SODIUM CHLORIDE 4.2 MICROGRAM(S)/KG/HR: 4 INJECTION INTRAVENOUS at 08:51

## 2021-04-28 RX ADMIN — OLANZAPINE 2.5 MILLIGRAM(S): 15 TABLET, FILM COATED ORAL at 18:02

## 2021-04-28 RX ADMIN — Medication 1 DROP(S): at 05:32

## 2021-04-28 RX ADMIN — MIDAZOLAM HYDROCHLORIDE 4 MILLIGRAM(S): 1 INJECTION, SOLUTION INTRAMUSCULAR; INTRAVENOUS at 20:46

## 2021-04-28 RX ADMIN — Medication 400 MILLIGRAM(S): at 20:23

## 2021-04-28 RX ADMIN — Medication 5 MILLIGRAM(S): at 23:48

## 2021-04-28 RX ADMIN — FENTANYL CITRATE 21 MICROGRAM(S)/KG/HR: 50 INJECTION INTRAVENOUS at 08:51

## 2021-04-28 RX ADMIN — CHLORHEXIDINE GLUCONATE 1 APPLICATION(S): 213 SOLUTION TOPICAL at 05:32

## 2021-04-28 RX ADMIN — MIDAZOLAM HYDROCHLORIDE 4 MILLIGRAM(S): 1 INJECTION, SOLUTION INTRAMUSCULAR; INTRAVENOUS at 11:40

## 2021-04-28 RX ADMIN — Medication 5 MILLIGRAM(S): at 11:40

## 2021-04-28 RX ADMIN — Medication 1000 MILLIGRAM(S): at 11:42

## 2021-04-28 RX ADMIN — FENTANYL CITRATE 1 PATCH: 50 INJECTION INTRAVENOUS at 19:47

## 2021-04-28 RX ADMIN — Medication 1000 MILLIGRAM(S): at 20:44

## 2021-04-28 RX ADMIN — DEXMEDETOMIDINE HYDROCHLORIDE IN 0.9% SODIUM CHLORIDE 21 MICROGRAM(S)/KG/HR: 4 INJECTION INTRAVENOUS at 04:58

## 2021-04-28 RX ADMIN — PANTOPRAZOLE SODIUM 40 MILLIGRAM(S): 20 TABLET, DELAYED RELEASE ORAL at 05:32

## 2021-04-28 RX ADMIN — FENTANYL CITRATE 4.2 MICROGRAM(S)/KG/HR: 50 INJECTION INTRAVENOUS at 20:09

## 2021-04-28 RX ADMIN — Medication 5 MILLIGRAM(S): at 18:02

## 2021-04-28 RX ADMIN — PANTOPRAZOLE SODIUM 40 MILLIGRAM(S): 20 TABLET, DELAYED RELEASE ORAL at 18:03

## 2021-04-28 RX ADMIN — Medication 1 DROP(S): at 18:03

## 2021-04-28 RX ADMIN — CHLORHEXIDINE GLUCONATE 15 MILLILITER(S): 213 SOLUTION TOPICAL at 05:32

## 2021-04-28 RX ADMIN — OLANZAPINE 2.5 MILLIGRAM(S): 15 TABLET, FILM COATED ORAL at 05:32

## 2021-04-28 RX ADMIN — FENTANYL CITRATE 21 MICROGRAM(S)/KG/HR: 50 INJECTION INTRAVENOUS at 03:54

## 2021-04-28 RX ADMIN — FENTANYL CITRATE 1 PATCH: 50 INJECTION INTRAVENOUS at 03:55

## 2021-04-28 RX ADMIN — FENTANYL CITRATE 1 PATCH: 50 INJECTION INTRAVENOUS at 11:41

## 2021-04-28 NOTE — PROGRESS NOTE ADULT - SUBJECTIVE AND OBJECTIVE BOX
INTERVAL HPI/OVERNIGHT EVENTS: ***    PRESSORS: [ ] YES [ ] NO  WHICH:    ANTIBIOTICS:                  DATE STARTED:  ANTIBIOTICS:                  DATE STARTED:  ANTIBIOTICS:                  DATE STARTED:    Antimicrobial:    Cardiovascular:    Pulmonary:  ALBUTerol    90 MICROgram(s) HFA Inhaler 2 Puff(s) Inhalation every 6 hours PRN    Hematalogic:    Other:  artificial  tears Solution 1 Drop(s) Both EYES every 4 hours PRN  ascorbic acid 1000 milliGRAM(s) Oral daily  chlorhexidine 0.12% Liquid 15 milliLiter(s) Oral Mucosa every 12 hours  chlorhexidine 2% Cloths 1 Application(s) Topical <User Schedule>  dexMEDEtomidine Infusion 0.2 MICROgram(s)/kG/Hr IV Continuous <Continuous>  diazepam    Tablet 5 milliGRAM(s) Oral every 6 hours  fentaNYL   Infusion. 2.5 MICROgram(s)/kG/Hr IV Continuous <Continuous>  fentaNYL   Patch  25 MICROgram(s)/Hr. 1 Patch Transdermal every 48 hours  insulin lispro (ADMELOG) corrective regimen sliding scale   SubCutaneous every 6 hours  OLANZapine 2.5 milliGRAM(s) Oral two times a day  pantoprazole  Injectable 40 milliGRAM(s) IV Push every 12 hours  sodium chloride 0.9% lock flush 10 milliLiter(s) IV Push every 1 hour PRN    ALBUTerol    90 MICROgram(s) HFA Inhaler 2 Puff(s) Inhalation every 6 hours PRN  artificial  tears Solution 1 Drop(s) Both EYES every 4 hours PRN  ascorbic acid 1000 milliGRAM(s) Oral daily  chlorhexidine 0.12% Liquid 15 milliLiter(s) Oral Mucosa every 12 hours  chlorhexidine 2% Cloths 1 Application(s) Topical <User Schedule>  dexMEDEtomidine Infusion 0.2 MICROgram(s)/kG/Hr IV Continuous <Continuous>  diazepam    Tablet 5 milliGRAM(s) Oral every 6 hours  fentaNYL   Infusion. 2.5 MICROgram(s)/kG/Hr IV Continuous <Continuous>  fentaNYL   Patch  25 MICROgram(s)/Hr. 1 Patch Transdermal every 48 hours  insulin lispro (ADMELOG) corrective regimen sliding scale   SubCutaneous every 6 hours  OLANZapine 2.5 milliGRAM(s) Oral two times a day  pantoprazole  Injectable 40 milliGRAM(s) IV Push every 12 hours  sodium chloride 0.9% lock flush 10 milliLiter(s) IV Push every 1 hour PRN    Drug Dosing Weight  Height (cm): 167.6 (23 Apr 2021 23:15)  Weight (kg): 83.9 (23 Apr 2021 23:15)  BMI (kg/m2): 29.9 (23 Apr 2021 23:15)  BSA (m2): 1.93 (23 Apr 2021 23:15)    CENTRAL LINE: [ ] YES [ ] NO  LOCATION:         RIVERA: [ ] YES [ ] NO          A-LINE:  [ ] YES [ ] NO  LOCATION:             ICU Vital Signs Last 24 Hrs  T(C): 37.4 (28 Apr 2021 04:00), Max: 37.4 (27 Apr 2021 21:47)  T(F): 99.4 (28 Apr 2021 04:00), Max: 99.4 (27 Apr 2021 21:47)  HR: 118 (28 Apr 2021 04:13) (78 - 118)  BP: --  BP(mean): --  ABP: 138/83 (28 Apr 2021 04:00) (85/47 - 157/86)  ABP(mean): 109 (28 Apr 2021 04:00) (61 - 117)  RR: 28 (28 Apr 2021 04:00) (25 - 36)  SpO2: 100% (28 Apr 2021 04:13) (90% - 100%)      ABG - ( 28 Apr 2021 04:33 )  pH, Arterial: 7.51  pH, Blood: x     /  pCO2: 55    /  pO2: 73    / HCO3: 44    / Base Excess: 17.9  /  SaO2: NR                    04-27 @ 07:01  -  04-28 @ 07:00  --------------------------------------------------------  IN: 1548 mL / OUT: 1500 mL / NET: 48 mL        Mode: AC/ CMV (Assist Control/ Continuous Mandatory Ventilation)  RR (machine): 28  TV (machine): 430  FiO2: 60  PEEP: 3  ITime: 1  MAP: 10  PIP: 37        PHYSICAL EXAM:    GENERAL: NAD  EYES: EOMI, PERRLA  NECK: Supple, No JVD; Normal thyroid; Trachea midline: No LAD   NERVOUS SYSTEM:  Alert & Oriented X3,  Motor Strength 5/5 B/L upper and lower extremities; DTRs 2+ intact and symmetric  CHEST/LUNG: No rales, rhonchi, wheezing, breath sounds present bilaterally  HEART: Regular rate and rhythm; No murmurs, no gallops  ABDOMEN: Soft, Nontender, Nondistended; Bowel sounds present, no pain or masses on palpation  : voiding well, Rivera in place  EXTREMITIES:  2+ Peripheral Pulses, No clubbing, cyanosis, or edema  SKIN: warm, intact, no lesions         LABS:  CBC Full  -  ( 28 Apr 2021 03:54 )  WBC Count : 9.91 K/uL  RBC Count : 2.21 M/uL  Hemoglobin : 7.1 g/dL  Hematocrit : 23.2 %  Platelet Count - Automated : 351 K/uL  Mean Cell Volume : 105.0 fl  Mean Cell Hemoglobin : 32.1 pg  Mean Cell Hemoglobin Concentration : 30.6 gm/dL  Auto Neutrophil # : x  Auto Lymphocyte # : x  Auto Monocyte # : x  Auto Eosinophil # : x  Auto Basophil # : x  Auto Neutrophil % : x  Auto Lymphocyte % : x  Auto Monocyte % : x  Auto Eosinophil % : x  Auto Basophil % : x    04-28    145  |  106  |  5<L>  ----------------------------<  93  3.8   |  37<H>  |  <0.20<L>    Ca    7.9<L>      28 Apr 2021 03:54  Phos  2.7     04-28  Mg     1.8     04-28    TPro  5.8<L>  /  Alb  1.7<L>  /  TBili  0.5  /  DBili  x   /  AST  20  /  ALT  18  /  AlkPhos  86  04-28            RADIOLOGY & ADDITIONAL STUDIES REVIEWED:  ***    [ ]GOALS OF CARE DISCUSSION WITH PATIENT/FAMILY/PROXY:    CRITICAL CARE TIME SPENT: 35 minutes   INTERVAL HPI/OVERNIGHT EVENTS: patient was taken to CT scan head and abdomen on 4/27 to r/o any causes of active bleeding. CTA abdomen was not able to be done due to IV line infiltration on right AC. CTH and non contrast CT abdomen showed no acute changes. Spoke to radiologist to confirm what could be hyperdensity on rectum which he stated no concern for blood accumulation.  Patient's hemoglobin has been around 7 even after blood transfusions. Patient received 2 units of PRBC in the last 24 hrs without any source of active bleeding (hematuria, hematemesis or BRBPR). Will continue to monitor CBC for now and will send hemolysis w/u. Rectal tube discontinued.     PRESSORS: [ ] YES [x ] NO      Antimicrobial:    Cardiovascular:    Pulmonary:  ALBUTerol    90 MICROgram(s) HFA Inhaler 2 Puff(s) Inhalation every 6 hours PRN    Hematalogic:    Other:  artificial  tears Solution 1 Drop(s) Both EYES every 4 hours PRN  ascorbic acid 1000 milliGRAM(s) Oral daily  chlorhexidine 0.12% Liquid 15 milliLiter(s) Oral Mucosa every 12 hours  chlorhexidine 2% Cloths 1 Application(s) Topical <User Schedule>  dexMEDEtomidine Infusion 0.2 MICROgram(s)/kG/Hr IV Continuous <Continuous>  diazepam    Tablet 5 milliGRAM(s) Oral every 6 hours  fentaNYL   Infusion. 2.5 MICROgram(s)/kG/Hr IV Continuous <Continuous>  fentaNYL   Patch  25 MICROgram(s)/Hr. 1 Patch Transdermal every 48 hours  insulin lispro (ADMELOG) corrective regimen sliding scale   SubCutaneous every 6 hours  OLANZapine 2.5 milliGRAM(s) Oral two times a day  pantoprazole  Injectable 40 milliGRAM(s) IV Push every 12 hours  sodium chloride 0.9% lock flush 10 milliLiter(s) IV Push every 1 hour PRN    ALBUTerol    90 MICROgram(s) HFA Inhaler 2 Puff(s) Inhalation every 6 hours PRN  artificial  tears Solution 1 Drop(s) Both EYES every 4 hours PRN  ascorbic acid 1000 milliGRAM(s) Oral daily  chlorhexidine 0.12% Liquid 15 milliLiter(s) Oral Mucosa every 12 hours  chlorhexidine 2% Cloths 1 Application(s) Topical <User Schedule>  dexMEDEtomidine Infusion 0.2 MICROgram(s)/kG/Hr IV Continuous <Continuous>  diazepam    Tablet 5 milliGRAM(s) Oral every 6 hours  fentaNYL   Infusion. 2.5 MICROgram(s)/kG/Hr IV Continuous <Continuous>  fentaNYL   Patch  25 MICROgram(s)/Hr. 1 Patch Transdermal every 48 hours  insulin lispro (ADMELOG) corrective regimen sliding scale   SubCutaneous every 6 hours  OLANZapine 2.5 milliGRAM(s) Oral two times a day  pantoprazole  Injectable 40 milliGRAM(s) IV Push every 12 hours  sodium chloride 0.9% lock flush 10 milliLiter(s) IV Push every 1 hour PRN    Drug Dosing Weight  Height (cm): 167.6 (23 Apr 2021 23:15)  Weight (kg): 83.9 (23 Apr 2021 23:15)  BMI (kg/m2): 29.9 (23 Apr 2021 23:15)  BSA (m2): 1.93 (23 Apr 2021 23:15)    CENTRAL LINE: [ ] YES [ ] NO  LOCATION:         RIVERA: [ ] YES [ ] NO          A-LINE:  [ ] YES [ ] NO  LOCATION:             ICU Vital Signs Last 24 Hrs  T(C): 37.4 (28 Apr 2021 04:00), Max: 37.4 (27 Apr 2021 21:47)  T(F): 99.4 (28 Apr 2021 04:00), Max: 99.4 (27 Apr 2021 21:47)  HR: 118 (28 Apr 2021 04:13) (78 - 118)  BP: --  BP(mean): --  ABP: 138/83 (28 Apr 2021 04:00) (85/47 - 157/86)  ABP(mean): 109 (28 Apr 2021 04:00) (61 - 117)  RR: 28 (28 Apr 2021 04:00) (25 - 36)  SpO2: 100% (28 Apr 2021 04:13) (90% - 100%)      ABG - ( 28 Apr 2021 04:33 )  pH, Arterial: 7.51  pH, Blood: x     /  pCO2: 55    /  pO2: 73    / HCO3: 44    / Base Excess: 17.9  /  SaO2: NR                    04-27 @ 07:01  -  04-28 @ 07:00  --------------------------------------------------------  IN: 1548 mL / OUT: 1500 mL / NET: 48 mL        Mode: AC/ CMV (Assist Control/ Continuous Mandatory Ventilation)  RR (machine): 28  TV (machine): 430  FiO2: 60  PEEP: 3  ITime: 1  MAP: 10  PIP: 37        PHYSICAL EXAM:    GENERAL: NAD, sat above 95%  EYES: EOMI, PERRLA  NECK: Supple, No JVD; Normal thyroid; Trachea midline: No LAD, trach    NERVOUS SYSTEM: sedated, able to follow commands    CHEST/LUNG: No rales, rhonchi, wheezing, breath sounds present bilaterally  HEART: Regular rate and rhythm; No murmurs, no gallops  ABDOMEN: Soft, Nontender, Nondistended; Bowel sounds present, no pain or masses on palpation, G tube in place- functional, no signs of infection   : condom cath in place  EXTREMITIES:  2+ Peripheral Pulses, No clubbing or cyanosis. Bilateral upper extremity edema  SKIN: warm, intact, no lesions         LABS:  CBC Full  -  ( 28 Apr 2021 03:54 )  WBC Count : 9.91 K/uL  RBC Count : 2.21 M/uL  Hemoglobin : 7.1 g/dL  Hematocrit : 23.2 %  Platelet Count - Automated : 351 K/uL  Mean Cell Volume : 105.0 fl  Mean Cell Hemoglobin : 32.1 pg  Mean Cell Hemoglobin Concentration : 30.6 gm/dL  Auto Neutrophil # : x  Auto Lymphocyte # : x  Auto Monocyte # : x  Auto Eosinophil # : x  Auto Basophil # : x  Auto Neutrophil % : x  Auto Lymphocyte % : x  Auto Monocyte % : x  Auto Eosinophil % : x  Auto Basophil % : x    04-28    145  |  106  |  5<L>  ----------------------------<  93  3.8   |  37<H>  |  <0.20<L>    Ca    7.9<L>      28 Apr 2021 03:54  Phos  2.7     04-28  Mg     1.8     04-28    TPro  5.8<L>  /  Alb  1.7<L>  /  TBili  0.5  /  DBili  x   /  AST  20  /  ALT  18  /  AlkPhos  86  04-28            RADIOLOGY & ADDITIONAL STUDIES REVIEWED:  ***    [ ]GOALS OF CARE DISCUSSION WITH PATIENT/FAMILY/PROXY:    CRITICAL CARE TIME SPENT: 35 minutes

## 2021-04-28 NOTE — PROGRESS NOTE ADULT - ASSESSMENT
Assessment and plan: 54 y/o M with no PMH is admitted to ICU for AHRF 2/2 covid19 pna     1. Acute hypoxic respiratory failure  2. ARDS 2/2 Covid pneumonia  3. Transaminitis  4. Prediabetes  5. Abnormal TSH    =================== Neuro============================  -Alert and oriented x 3 at baseline   -Intubated and sedated 3/29 on Vent  -Trach 4/22 continues on Vent    -Continue w/ fentanyl and Precedex   -C/w fentanyl patch   -Off propofol  -CT head unremarkable     ================= Cardiovascular==========================  # Hypotension improving  Decreased midodrine to 5 mg q8hrs   Off pressors     # TACHYCARDIA: recurrent episodes   -Lopressor PRN pushes   -HR in 110's  -Continue monitoring     ================- Pulm=================================  #Acute hypoxic respiratory failure: secondary to Covid19 pneumonia  #ARDS  -Was on HFNC then got intubated 3/29  -D-dimer initially elevated on presentation, trending down    -s/p Nimbex and Epifanio at different occasions for vent synchrony  -s/p Pigtail on left chest, removed on 4/15  -Large Pneumothorax was noted on 4/18 on left side, s/p chest tube placement, still has pneumothorax- resolving    -Trach 4/22 continues on Vent   -Remdesivir was discontinued due to positive antibodies   - Finished Dexamethasone   - Prednisone taper Finished  - Covid19 PCR negative now, off isolation       # Bacterial Pneumonia  - stable infiltrate on CXR ,likely developed VAP, Finished ABx Zosyn, meropenem, s/p 1 dose of Vancomycin  - MRSA negative from 3/26  - Sputum Cx growing few gram negative rods, CRE  - Pulm. Dr. Ryan  - ID Dr Anand       #Pneumothorax and pneumomediastinum:   -X-ray chest: remains with left pneumothorax  -Thoracic surgery following  -s/p Chest tube placed 4/8 pigtail to wall suction discontinued on 4/15  -On 4/18 chest tube was placed back on left lung to treat pneumothorax- resolving   -CXR 4/26 still shows left lung pneumothorax - CXR 4/27 shows some improvement on pneumothorax        ==================ID===================================  Likely bacterial pneumonia   -leukocytosis resolved  -No more fever, On Tylenol PRN only   -Finished Meropenem  -plan as above     ================= Nephro================================  -Pitting edema to both lower arms  -on condom cath   -monitor I/Os , good urine output overall  -s/p Albumin x 2 days on 4/10  -Lasix 40mg BID with Albumin 25% Q6 for 48 hours to help with urine output and fluid status.( 4/15) Albumin finished , was D/jennifer on 4/19, urine output decreasing  - Patient can get Lasix as needed   - Metabolic Alkalosis likely due to compensation, improving post Diamox     =================GI====================================  Transaminitis:   likely secondary to Covid19   - and  on presentation   hepatitis panel -ve  -Improved, now normalized   -continue to monitor LFT    Diet:   S/p NGT (3/29) with tube feed  Bowel regimen discontinued today as patient is having frequent BM  G tube 4/23, on feeds        ================ Heme==================================  Elevated d-dimer: likely secondary to Covid19   -D-dimer 423 on admission  -Last D-dimer 1434  -Discontinued prophylactic Lovenox 40 mg daily for concern of bleed (drop in hb)    Anemia  On 4/26 Hgb 8.8 dropped to 6.8, s/p 1 unit of PRBC repeat hb 7.2  Will transfuse another unit of PRBC   Will get CTA abdomen to r/o any bleeding causes   No active signs of bleeding (No hematemesis, melena or hematuria)      =================Endocrine===============================  Prediabetes:  -A1c 5.8  -BS controlled  -continue HSS    Abnormal TSH:  -TSH level noted 0.26,   -repeat TSH 0.37 and Free T4 1.82    ================= Skin/Catheters============================  No rashes. Peripheral IV lines.     - =================Prophylaxis =============================  Lovenox for DVT proph : discontinued Lovenox for now - concern of bleed due to drop in Hb  Protonix for  GI proph    ==================GOC==================================   FULL CODE Assessment and plan: 56 y/o M with no PMH is admitted to ICU for AHRF 2/2 covid19 pna     1. Acute hypoxic respiratory failure  2. ARDS 2/2 Covid pneumonia  3. Transaminitis  4. Prediabetes  5. Abnormal TSH    =================== Neuro============================  -Alert and oriented x 3 at baseline   -Intubated and sedated 3/29 on Vent  -Trach 4/22 continues on Vent    -Continue w/ fentanyl and Precedex   -C/w fentanyl patch, increased dose to 50 mcg   - Will give one dose of Versed to improve respiratory status   -Off propofol  -CT head unremarkable     ================= Cardiovascular==========================  # Hypotension improving  C/w midodrine 5 mg q8hrs   Off pressors     # TACHYCARDIA: recurrent episodes   -Lopressor PRN pushes   -HR in 110's  -Continue monitoring     ================- Pulm=================================  #Acute hypoxic respiratory failure: secondary to Covid19 pneumonia  #ARDS  -Was on HFNC then got intubated 3/29  -D-dimer initially elevated on presentation, trending down    -s/p Nimbex and Epifanio at different occasions for vent synchrony  -s/p Pigtail on left chest, removed on 4/15  -Large Pneumothorax was noted on 4/18 on left side, s/p chest tube placement to suction since 4/27, still has pneumothorax- resolving    -Trach 4/22 continues on Vent   -Remdesivir was discontinued due to positive antibodies   - Finished Dexamethasone   - Prednisone taper Finished  - Covid19 PCR negative now, off isolation       # Bacterial Pneumonia  - stable infiltrate on CXR ,likely developed VAP, Finished ABx Zosyn, meropenem, s/p 1 dose of Vancomycin  - MRSA negative from 3/26  - Sputum Cx growing few gram negative rods, CRE  - Pulm. Dr. Ryan  - ID Dr Anand       #Pneumothorax and pneumomediastinum:   -X-ray chest: remains with left pneumothorax  -Thoracic surgery following  -s/p Chest tube placed 4/8 pigtail to wall suction discontinued on 4/15  -On 4/18 chest tube was placed back on left lung to treat pneumothorax- resolving, now chest tube to suction    -CXR 4/26 still shows left lung pneumothorax - CXR 4/28 persistent pneumothorax         ==================ID===================================  Likely bacterial pneumonia   -leukocytosis resolved  -No more fever, On Tylenol PRN only   -Finished Meropenem  -plan as above     ================= Nephro================================  -Pitting edema to both arms, ecchymosis on right AC, blisters on left arm around brachial area    -on condom cath   -monitor I/Os , good urine output overall  -s/p Albumin x 2 days on 4/10  -Lasix 40mg BID with Albumin 25% Q6 for 48 hours to help with urine output and fluid status.( 4/15) Albumin finished , was D/jnenifer on 4/19, urine output decreasing  - Patient can get Lasix as needed   - Metabolic Alkalosis likely due to compensation, improving post Diamox     =================GI====================================  Transaminitis:   likely secondary to Covid19   - and  on presentation   hepatitis panel -ve  -Improved, now normalized   -continue to monitor LFT    Diet:   S/p NGT (3/29) with tube feed  Off Bowel regimen, as patient is having frequent BM  G tube 4/23, resumed feeds    D/c rectal tube       ================ Heme==================================  Elevated d-dimer: likely secondary to Covid19   -D-dimer 423 on admission  -Last D-dimer 1434  - Off prophylactic Lovenox 40 mg daily for concern of bleed (drop in hb)    Anemia  On 4/26 Hb 8.8 dropped to 6.8, s/p 1 unit of PRBC repeat hb 7.2  On 4/27 Hb 7, s/p 2 units of PRBC hb remains 7  CTH and CT abdomen w/o contrast no evidence of bleed    No active signs of bleeding (No hematemesis, melena or hematuria)  F/u hemolysis w/u  F/u CBC q 6-8 hrs     =================Endocrine===============================  Prediabetes:  -A1c 5.8  -BS controlled  -continue HSS    Abnormal TSH:  -TSH level noted 0.26,   -Repeat TSH 0.37 and Free T4 1.82    ================= Skin/Catheters============================  No rashes. Peripheral IV lines.   Both arms with pitting edema, right AC with ecchymosis and left AC with blisters     - =================Prophylaxis =============================  Off Lovenox for DVT, on SCD   Protonix for GI proph    ==================GOC==================================   FULL CODE

## 2021-04-28 NOTE — PROGRESS NOTE ADULT - SUBJECTIVE AND OBJECTIVE BOX
Patient is a 55y old  Male who presents with a chief complaint of SOB (28 Apr 2021 07:08)  Patient is awake, not alert, laying in bed on ventilator via trach. Oxygen sat 94% on 50 FIO2    INTERVAL HPI/OVERNIGHT EVENTS:      VITAL SIGNS:  T(F): 97.1 (04-28-21 @ 08:00)  HR: 113 (04-28-21 @ 10:00)  BP: 137/66 (04-28-21 @ 10:00)  RR: 37 (04-28-21 @ 10:00)  SpO2: 94% (04-28-21 @ 10:00)  Wt(kg): --  I&O's Detail    27 Apr 2021 07:01  -  28 Apr 2021 07:00  --------------------------------------------------------  IN:    Dexmedetomidine: 536.2 mL    FentaNYL: 504 mL    Glucerna 1.5: 80 mL    IV PiggyBack: 300 mL    PRBCs (Packed Red Blood Cells): 350 mL  Total IN: 1770.2 mL    OUT:    Chest Tube (mL): 90 mL    Incontinent per Condom Catheter (mL): 1750 mL    Rectal Tube (mL): 40 mL  Total OUT: 1880 mL    Total NET: -109.8 mL        Mode: AC/ CMV (Assist Control/ Continuous Mandatory Ventilation)  RR (machine): 28  TV (machine): 430  FiO2: 60  PEEP: 3  ITime: 0.7  MAP: 14  PIP: 43        REVIEW OF SYSTEMS:    CONSTITUTIONAL:  No fevers, chills, sweats    HEENT:  Eyes:  No diplopia or blurred vision. ENT:  No earache, sore throat or runny nose.    CARDIOVASCULAR:  No pressure, squeezing, tightness, or heaviness about the chest; no palpitations.    RESPIRATORY:  Per HPI    GASTROINTESTINAL:  No abdominal pain, nausea, vomiting or diarrhea.    GENITOURINARY:  No dysuria, frequency or urgency.    NEUROLOGIC:  No paresthesias, fasciculations, seizures or weakness.    PSYCHIATRIC:  No disorder of thought or mood.      PHYSICAL EXAM:    Constitutional: Well developed and nourished  Eyes:Perrla  ENMT: normal  Neck:supple  Respiratory: good air entry  Cardiovascular: S1 S2 regular  Gastrointestinal: Soft, Non tender  Extremities: + edema  Vascular:normal  Neurological:Awake, not alert  Musculoskeletal:Normal      MEDICATIONS  (STANDING):  ascorbic acid 1000 milliGRAM(s) Oral daily  chlorhexidine 0.12% Liquid 15 milliLiter(s) Oral Mucosa every 12 hours  chlorhexidine 2% Cloths 1 Application(s) Topical <User Schedule>  dexMEDEtomidine Infusion 0.2 MICROgram(s)/kG/Hr (4.2 mL/Hr) IV Continuous <Continuous>  diazepam    Tablet 5 milliGRAM(s) Oral every 6 hours  fentaNYL   Infusion. 2.5 MICROgram(s)/kG/Hr (21 mL/Hr) IV Continuous <Continuous>  fentaNYL   Patch  50 MICROgram(s)/Hr. 1 Patch Transdermal every 48 hours  insulin lispro (ADMELOG) corrective regimen sliding scale   SubCutaneous every 6 hours  midazolam Injectable 4 milliGRAM(s) IV Push once  OLANZapine 2.5 milliGRAM(s) Oral two times a day  pantoprazole  Injectable 40 milliGRAM(s) IV Push every 12 hours    MEDICATIONS  (PRN):  ALBUTerol    90 MICROgram(s) HFA Inhaler 2 Puff(s) Inhalation every 6 hours PRN Shortness of Breath and/or Wheezing  artificial  tears Solution 1 Drop(s) Both EYES every 4 hours PRN Dry Eyes  sodium chloride 0.9% lock flush 10 milliLiter(s) IV Push every 1 hour PRN Pre/post blood products, medications, blood draw, and to maintain line patency      Allergies    No Known Allergies    Intolerances        LABS:                        7.1    9.91  )-----------( 351      ( 28 Apr 2021 03:54 )             23.2     04-28    145  |  106  |  5<L>  ----------------------------<  93  3.8   |  37<H>  |  <0.20<L>    Ca    7.9<L>      28 Apr 2021 03:54  Phos  2.7     04-28  Mg     1.8     04-28    TPro  5.8<L>  /  Alb  1.7<L>  /  TBili  0.5  /  DBili  x   /  AST  20  /  ALT  18  /  AlkPhos  86  04-28        ABG - ( 28 Apr 2021 04:33 )  pH, Arterial: 7.51  pH, Blood: x     /  pCO2: 55    /  pO2: 73    / HCO3: 44    / Base Excess: 17.9  /  SaO2: NR                    CAPILLARY BLOOD GLUCOSE      POCT Blood Glucose.: 97 mg/dL (28 Apr 2021 05:40)  POCT Blood Glucose.: 104 mg/dL (27 Apr 2021 23:17)    pro-bnp -- 04-26 @ 05:42     d-dimer 1434  04-26 @ 05:42  pro-bnp -- 04-23 @ 06:21     d-dimer 1097  04-23 @ 06:21  pro-bnp -- 04-22 @ 03:55     d-dimer 1367  04-22 @ 03:55      RADIOLOGY & ADDITIONAL TESTS:    CXR:  < from: Xray Chest 1 View- PORTABLE-Routine (Xray Chest 1 View- PORTABLE-Routine in AM.) (04.27.21 @ 09:03) >  IMPRESSION: Interval decrease in volume of left-sided pneumothorax. Stable extensive bilateral lung parenchymal airspace opacities.      < end of copied text >    Ct scan chest:    ekg;    echo:

## 2021-04-28 NOTE — CHART NOTE - NSCHARTNOTEFT_GEN_A_CORE
Spoke to brothkai Amin to give daily update. I informed sequence of events from yesterday and explained results of CT abdomen and head. I also informed about plan of care and visiting. Questions answered.

## 2021-04-28 NOTE — PROGRESS NOTE ADULT - SUBJECTIVE AND OBJECTIVE BOX
Patient is a 55y old  Male who presents with a chief complaint of SOB (27 Apr 2021 10:44)    pt seen in icu [ x ], reg med floor [   ], bed [ x ], chair at bedside [   ], awake and responsive [x  ], sedated [  ],  nad [x  ]    andrea [ x ], g-tube feeding [x  ], tracheostomy tube [ x ], cent line [x  ],        Allergies    No Known Allergies        Vitals    T(F): 99.4 (04-28-21 @ 04:00), Max: 99.4 (04-27-21 @ 21:47)  HR: 118 (04-28-21 @ 04:13) (78 - 119)  BP: --  RR: 28 (04-28-21 @ 04:00) (25 - 36)  SpO2: 100% (04-28-21 @ 04:13) (90% - 100%)  Wt(kg): --  CAPILLARY BLOOD GLUCOSE      POCT Blood Glucose.: 97 mg/dL (28 Apr 2021 05:40)      Labs                          7.1    9.91  )-----------( 351      ( 28 Apr 2021 03:54 )             23.2       04-28    145  |  106  |  5<L>  ----------------------------<  93  3.8   |  37<H>  |  <0.20<L>    Ca    7.9<L>      28 Apr 2021 03:54  Phos  2.7     04-28  Mg     1.8     04-28    TPro  5.8<L>  /  Alb  1.7<L>  /  TBili  0.5  /  DBili  x   /  AST  20  /  ALT  18  /  AlkPhos  86  04-28            .Sputum Sputum  04-16 @ 04:42   Moderate Enterobacter aerogenes (Carbapenem Resistant)  Normal Respiratory Monserrat present  --  Enterobacter aerogenes (Carbapenem Resistant)      .Urine Clean Catch (Midstream)  03-15 @ 00:52   No growth  --  --          Radiology Results      Meds    MEDICATIONS  (STANDING):  ascorbic acid 1000 milliGRAM(s) Oral daily  chlorhexidine 0.12% Liquid 15 milliLiter(s) Oral Mucosa every 12 hours  chlorhexidine 2% Cloths 1 Application(s) Topical <User Schedule>  dexMEDEtomidine Infusion 1 MICROgram(s)/kG/Hr (21 mL/Hr) IV Continuous <Continuous>  diazepam    Tablet 5 milliGRAM(s) Oral every 6 hours  fentaNYL   Infusion. 2.5 MICROgram(s)/kG/Hr (21 mL/Hr) IV Continuous <Continuous>  fentaNYL   Patch  25 MICROgram(s)/Hr. 1 Patch Transdermal every 48 hours  insulin lispro (ADMELOG) corrective regimen sliding scale   SubCutaneous every 6 hours  OLANZapine 2.5 milliGRAM(s) Oral two times a day  pantoprazole  Injectable 40 milliGRAM(s) IV Push every 12 hours      MEDICATIONS  (PRN):  ALBUTerol    90 MICROgram(s) HFA Inhaler 2 Puff(s) Inhalation every 6 hours PRN Shortness of Breath and/or Wheezing  artificial  tears Solution 1 Drop(s) Both EYES every 4 hours PRN Dry Eyes  sodium chloride 0.9% lock flush 10 milliLiter(s) IV Push every 1 hour PRN Pre/post blood products, medications, blood draw, and to maintain line patency      Physical Exam    Neuro :  no focal deficits  Respiratory: CTA B/L  CV: RRR, S1S2, no murmurs,   Abdominal: Soft, NT, ND +BS, g- tube in place, dressing cdi  Extremities: b/l ue edema, + peripheral pulses      ASSESSMENT    Hypoxemia 2nd to covid pna   transaminitis  prediabetes  h/o appendectomy  cholecystectomy        PLAN    contact and airborne isolation  d/c remdesevir given covid ab positive noted   completed dexamethasone   started pulse steroids for 3 days - 250mg solumedrol bid now tapered off 4/14/21  cont asa, vit c,    cont albuterol inhaler   pulm f/u  procalcitonin, D-dimer, crp, ldh, ferritin, lactate noted ,    tmx 99.7  cont tylenol prn,   cont robitussin prn   pt on fentanyl, precedex drip  s/p intubation 3/29/21   s/p tracheostomy 4/21/21  O2 sat  (90% - 100%) mech vent 60%  O2 via mech vent and taper fio2 as tolerated   vent mgmt as per icu  xray 3/19/21 with pneumomediastinum  rept cxr with New trace right apical pneumothorax. New mild left apical pneumothorax. Grossly stable small pneumomediastinum.  Soft tissue emphysema at the neck bases bilaterally. Grossly stable bilateral pulmonary infiltrates noted.   cxr 2/24 with No evidence of pneumothorax can be appreciated on the available image. This may be related to patient positioning. Evidence of pneumomediastinum and subcutaneous emphysema in the lower neck is again noted. There are patchy bibasilar infiltrates and elevated right hemidiaphragm noted.   cxr 3/29/21 with No significant change bilateral infiltrates. There is a small simple left apical pneumothorax. No significant pleural effusion. Bilateral subcutaneous emphysema similar to prior.   XRs on 7AM and 5PM with no obvious increase of ptx  cxr 4/4/21 with Improving bilateral airspace disease noted    cxr 4/8/21 with Small left pneumothorax noted.  thoracic surg f/u   s/p L chest tube replacement 4/18/21 for recurrence of left pneumothorax   cxr 4/20/21 with Unchanged advanced infiltrates and catheter left chest tube noted   CXR 4/11/21 with : No interval change compared to one day prior. No pneumothorax noted.   Daily CXR  Monitor O2 status   s/p tracheostomy 4/21/21   stay sutures out 2 weeks from OR date  cxr 4/21/21 with No evidence of active chest disease. Tracheostomy tube in place otherwise no significant change noted   cxr 4/25/21 with A 40% LEFT pneumothorax. LEFT multi-sidehole pigtail catheter overlies LEFT lower hemithorax.. Bilateral multifocal and diffuse ill-defined airspace opacities..  Follow-up AP portable chest radiograph 4/25/2021 AT 8:58 AM: Residual LEFT 30% upper zone pneumothorax. Otherwise no interval change.noted above..  s/p surgical placement of gastrostomy tube 4/23/2021.   pt started on tube feeding  id f/u   No need for further antibiotics as patient completed course of Meropenem  leukocytosis resolved  speutum cx with Enterobacter aerogenes (Carbapenem Resistant) noted above  andrea   rectal tube  lispro ss   prognosis poor  cont current meds  mgmt as per icu    Patient is a 55y old  Male who presents with a chief complaint of SOB (27 Apr 2021 10:44)    pt seen in icu [ x ], reg med floor [   ], bed [ x ], chair at bedside [   ], awake and responsive [x  ], sedated [  ],  nad [x  ]    andrea [ x ], g-tube feeding [x  ], tracheostomy tube [ x ], cent line [x  ],        Allergies    No Known Allergies        Vitals    T(F): 99.4 (04-28-21 @ 04:00), Max: 99.4 (04-27-21 @ 21:47)  HR: 118 (04-28-21 @ 04:13) (78 - 119)  BP: --  RR: 28 (04-28-21 @ 04:00) (25 - 36)  SpO2: 100% (04-28-21 @ 04:13) (90% - 100%)  Wt(kg): --  CAPILLARY BLOOD GLUCOSE      POCT Blood Glucose.: 97 mg/dL (28 Apr 2021 05:40)      Labs                          7.1    9.91  )-----------( 351      ( 28 Apr 2021 03:54 )             23.2       04-28    145  |  106  |  5<L>  ----------------------------<  93  3.8   |  37<H>  |  <0.20<L>    Ca    7.9<L>      28 Apr 2021 03:54  Phos  2.7     04-28  Mg     1.8     04-28    TPro  5.8<L>  /  Alb  1.7<L>  /  TBili  0.5  /  DBili  x   /  AST  20  /  ALT  18  /  AlkPhos  86  04-28            .Sputum Sputum  04-16 @ 04:42   Moderate Enterobacter aerogenes (Carbapenem Resistant)  Normal Respiratory Monserrat present  --  Enterobacter aerogenes (Carbapenem Resistant)      .Urine Clean Catch (Midstream)  03-15 @ 00:52   No growth  --  --          Radiology Results      Meds    MEDICATIONS  (STANDING):  ascorbic acid 1000 milliGRAM(s) Oral daily  chlorhexidine 0.12% Liquid 15 milliLiter(s) Oral Mucosa every 12 hours  chlorhexidine 2% Cloths 1 Application(s) Topical <User Schedule>  dexMEDEtomidine Infusion 1 MICROgram(s)/kG/Hr (21 mL/Hr) IV Continuous <Continuous>  diazepam    Tablet 5 milliGRAM(s) Oral every 6 hours  fentaNYL   Infusion. 2.5 MICROgram(s)/kG/Hr (21 mL/Hr) IV Continuous <Continuous>  fentaNYL   Patch  25 MICROgram(s)/Hr. 1 Patch Transdermal every 48 hours  insulin lispro (ADMELOG) corrective regimen sliding scale   SubCutaneous every 6 hours  OLANZapine 2.5 milliGRAM(s) Oral two times a day  pantoprazole  Injectable 40 milliGRAM(s) IV Push every 12 hours      MEDICATIONS  (PRN):  ALBUTerol    90 MICROgram(s) HFA Inhaler 2 Puff(s) Inhalation every 6 hours PRN Shortness of Breath and/or Wheezing  artificial  tears Solution 1 Drop(s) Both EYES every 4 hours PRN Dry Eyes  sodium chloride 0.9% lock flush 10 milliLiter(s) IV Push every 1 hour PRN Pre/post blood products, medications, blood draw, and to maintain line patency      Physical Exam    Neuro :  no focal deficits  Respiratory: CTA B/L  CV: RRR, S1S2, no murmurs,   Abdominal: Soft, NT, ND +BS, g- tube in place, dressing cdi  Extremities: b/l ue edema, + peripheral pulses      ASSESSMENT    Hypoxemia 2nd to covid pna   transaminitis  prediabetes  h/o appendectomy  cholecystectomy        PLAN    contact and airborne isolation  d/c remdesevir given covid ab positive noted   completed dexamethasone   started pulse steroids for 3 days - 250mg solumedrol bid now tapered off 4/14/21  cont asa, vit c,    cont albuterol inhaler   pulm f/u  procalcitonin, D-dimer, crp, ldh, ferritin, lactate noted ,    tmx 99.4  cont tylenol prn,   cont robitussin prn   pt on fentanyl, precedex drip  s/p intubation 3/29/21   s/p tracheostomy 4/21/21  O2 sat  (90% - 100%) mech vent 60%  O2 via mech vent and taper fio2 as tolerated   vent mgmt as per icu  xray 3/19/21 with pneumomediastinum  rept cxr with New trace right apical pneumothorax. New mild left apical pneumothorax. Grossly stable small pneumomediastinum.  Soft tissue emphysema at the neck bases bilaterally. Grossly stable bilateral pulmonary infiltrates noted.   cxr 2/24 with No evidence of pneumothorax can be appreciated on the available image. This may be related to patient positioning. Evidence of pneumomediastinum and subcutaneous emphysema in the lower neck is again noted. There are patchy bibasilar infiltrates and elevated right hemidiaphragm noted.   cxr 3/29/21 with No significant change bilateral infiltrates. There is a small simple left apical pneumothorax. No significant pleural effusion. Bilateral subcutaneous emphysema similar to prior.   XRs on 7AM and 5PM with no obvious increase of ptx  cxr 4/4/21 with Improving bilateral airspace disease noted    cxr 4/8/21 with Small left pneumothorax noted.  thoracic surg f/u   s/p L chest tube replacement 4/18/21 for recurrence of left pneumothorax   cxr 4/20/21 with Unchanged advanced infiltrates and catheter left chest tube noted   CXR 4/11/21 with : No interval change compared to one day prior. No pneumothorax noted.   Daily CXR  Monitor O2 status   s/p tracheostomy 4/21/21   stay sutures out 2 weeks from OR date  cxr 4/21/21 with No evidence of active chest disease. Tracheostomy tube in place otherwise no significant change noted   cxr 4/25/21 with A 40% LEFT pneumothorax. LEFT multi-sidehole pigtail catheter overlies LEFT lower hemithorax.. Bilateral multifocal and diffuse ill-defined airspace opacities..  Follow-up AP portable chest radiograph 4/25/2021 AT 8:58 AM: Residual LEFT 30% upper zone pneumothorax. Otherwise no interval change.noted above..  s/p surgical placement of gastrostomy tube 4/23/2021.   pt started on tube feeding  id f/u   No need for further antibiotics as patient completed course of Meropenem  leukocytosis resolved  speutum cx with Enterobacter aerogenes (Carbapenem Resistant) noted above  andrea   rectal tube  lispro ss   prognosis poor   s/p 3 units prbc for anemia  f/u h/h   cont current meds  mgmt as per icu

## 2021-04-28 NOTE — PROGRESS NOTE ADULT - ASSESSMENT
Assessment and Recommendation:   Problem/Recommendation - 1:  Problem: COVID-19. Recommendation: isolation precautions  Cont mechanical ventilation - S.P trach.   Wean as tolerated  Tracheal care  Pulmonary toilet  Supplemental nutrition  ICU management.   Monitor oxygen sat  Monitor LFT, LDH, CRP, D-Dimer, Ferritin and procalcitonin  Vit C, D and zinc supp  Montelukast 10 mgs po Qhs  Daily CXR   G-tube with feeds  DVT and GI PPX     Problem/Recommendation - 2:  ·  Problem: UTI (urinary tract infection).  Recommendation: F.U cultures   Off Antibiotics.     Problem/Recommendation - 3:  ·  Problem: Chest pain.  Recommendation: likely related to Covid-19 infection.     Problem/Recommendation - 4:  ·  Problem: Transaminitis.  Recommendation: monitor LFTs  GI F/U     Problem/Recommendation - 5:  ·  Problem: Pneumothorax.  Recommendation: Surgicel removed.   Thoracic sx follow up  Daily  CXR   Management per ICU     Problem/Recommendation - 6:  ·  Problem: Anemia.  Recommendation: PRBC PRN   Monitor labs  Monitor for bleeding

## 2021-04-28 NOTE — PROGRESS NOTE ADULT - ATTENDING COMMENTS
55 yr old  man , non smoker with  moody 1990s presented 3/14 with x9 days worsening cough, subjective fevers, and SOB, with x2-3 days dysuria and central, non-radiating, constant CP. Admitted to medicine unit  for acute hypoxic respiratory failure secondary to pna from covid-19 infection .     Assessment:  1. Acute hypoxic respiratory failure  2. Covid-19 infection   3. Transaminitis  4. Prediabetes  5. Bilateral pneumothorax  6. Septic shock - resolved  7. Anemia    Plan   -CT scan of the abdomen with out evidence of hematoma, no obvious signs of bleeding, stool remains brown colored  -Send hemolysis work up  -Trend hgb   -Transfuse to hgb > 7  -S/p tracheostomy placement 4/21   - S/p G-tube 4/23  -Continue tube feedings  -Completed antibiotics  -thoracic f/u noted   -Cont. mechanical ventilation   -Chest tube flushed, and now with a positive air leak  -Repeat CXR  -Fentanyl path  -monitor biomarkers daily, trending down   -dvt/gi prophy  -hemodynamic monitoring   -Remove Arterial line  -Prognosis is guarded

## 2021-04-29 LAB
ALBUMIN SERPL ELPH-MCNC: 1.5 G/DL — LOW (ref 3.5–5)
ALP SERPL-CCNC: 76 U/L — SIGNIFICANT CHANGE UP (ref 40–120)
ALT FLD-CCNC: 16 U/L DA — SIGNIFICANT CHANGE UP (ref 10–60)
ANION GAP SERPL CALC-SCNC: -1 MMOL/L — LOW (ref 5–17)
ANION GAP SERPL CALC-SCNC: -1 MMOL/L — LOW (ref 5–17)
AST SERPL-CCNC: 22 U/L — SIGNIFICANT CHANGE UP (ref 10–40)
BASE EXCESS BLDA CALC-SCNC: 15.1 MMOL/L — HIGH (ref -2–3)
BILIRUB SERPL-MCNC: 0.3 MG/DL — SIGNIFICANT CHANGE UP (ref 0.2–1.2)
BLOOD GAS COMMENTS ARTERIAL: SIGNIFICANT CHANGE UP
BUN SERPL-MCNC: 8 MG/DL — SIGNIFICANT CHANGE UP (ref 7–18)
BUN SERPL-MCNC: 8 MG/DL — SIGNIFICANT CHANGE UP (ref 7–18)
CALCIUM SERPL-MCNC: 7.7 MG/DL — LOW (ref 8.4–10.5)
CALCIUM SERPL-MCNC: 7.9 MG/DL — LOW (ref 8.4–10.5)
CHLORIDE SERPL-SCNC: 106 MMOL/L — SIGNIFICANT CHANGE UP (ref 96–108)
CHLORIDE SERPL-SCNC: 106 MMOL/L — SIGNIFICANT CHANGE UP (ref 96–108)
CO2 SERPL-SCNC: 36 MMOL/L — HIGH (ref 22–31)
CO2 SERPL-SCNC: 39 MMOL/L — HIGH (ref 22–31)
CREAT SERPL-MCNC: 0.21 MG/DL — LOW (ref 0.5–1.3)
CREAT SERPL-MCNC: 0.25 MG/DL — LOW (ref 0.5–1.3)
FOLATE SERPL-MCNC: 11.9 NG/ML — SIGNIFICANT CHANGE UP
GLUCOSE BLDC GLUCOMTR-MCNC: 173 MG/DL — HIGH (ref 70–99)
GLUCOSE SERPL-MCNC: 111 MG/DL — HIGH (ref 70–99)
GLUCOSE SERPL-MCNC: 172 MG/DL — HIGH (ref 70–99)
HAPTOGLOB SERPL-MCNC: 200 MG/DL — SIGNIFICANT CHANGE UP (ref 34–200)
HCO3 BLDA-SCNC: 42 MMOL/L — HIGH (ref 21–28)
HCT VFR BLD CALC: 20.5 % — CRITICAL LOW (ref 39–50)
HCT VFR BLD CALC: 24.2 % — LOW (ref 39–50)
HCT VFR BLD CALC: 26 % — LOW (ref 39–50)
HCYS SERPL-MCNC: 4 UMOL/L — SIGNIFICANT CHANGE UP
HGB BLD-MCNC: 7.5 G/DL — LOW (ref 13–17)
HGB BLD-MCNC: 7.7 G/DL — LOW (ref 13–17)
HGB BLD-MCNC: 8.7 G/DL — LOW (ref 13–17)
HOROWITZ INDEX BLDA+IHG-RTO: 60 — SIGNIFICANT CHANGE UP
MAGNESIUM SERPL-MCNC: 1.8 MG/DL — SIGNIFICANT CHANGE UP (ref 1.6–2.6)
MCHC RBC-ENTMCNC: 31.8 GM/DL — LOW (ref 32–36)
MCHC RBC-ENTMCNC: 32.1 PG — SIGNIFICANT CHANGE UP (ref 27–34)
MCHC RBC-ENTMCNC: 32.1 PG — SIGNIFICANT CHANGE UP (ref 27–34)
MCHC RBC-ENTMCNC: 33.5 GM/DL — SIGNIFICANT CHANGE UP (ref 32–36)
MCHC RBC-ENTMCNC: 36.6 GM/DL — HIGH (ref 32–36)
MCHC RBC-ENTMCNC: 38.5 PG — HIGH (ref 27–34)
MCV RBC AUTO: 100.8 FL — HIGH (ref 80–100)
MCV RBC AUTO: 105.1 FL — HIGH (ref 80–100)
MCV RBC AUTO: 95.9 FL — SIGNIFICANT CHANGE UP (ref 80–100)
NRBC # BLD: 0 /100 WBCS — SIGNIFICANT CHANGE UP (ref 0–0)
PCO2 BLDA: 57 MMHG — HIGH (ref 35–48)
PH BLDA: 7.47 — HIGH (ref 7.35–7.45)
PHOSPHATE SERPL-MCNC: 2.6 MG/DL — SIGNIFICANT CHANGE UP (ref 2.5–4.5)
PLATELET # BLD AUTO: 201 K/UL — SIGNIFICANT CHANGE UP (ref 150–400)
PLATELET # BLD AUTO: 274 K/UL — SIGNIFICANT CHANGE UP (ref 150–400)
PLATELET # BLD AUTO: 313 K/UL — SIGNIFICANT CHANGE UP (ref 150–400)
PO2 BLDA: 119 MMHG — HIGH (ref 83–108)
POTASSIUM SERPL-MCNC: 3.2 MMOL/L — LOW (ref 3.5–5.3)
POTASSIUM SERPL-MCNC: 5 MMOL/L — SIGNIFICANT CHANGE UP (ref 3.5–5.3)
POTASSIUM SERPL-SCNC: 3.2 MMOL/L — LOW (ref 3.5–5.3)
POTASSIUM SERPL-SCNC: 5 MMOL/L — SIGNIFICANT CHANGE UP (ref 3.5–5.3)
PROT SERPL-MCNC: 4.9 G/DL — LOW (ref 6–8.3)
RBC # BLD: 1.95 M/UL — LOW (ref 4.2–5.8)
RBC # BLD: 2.4 M/UL — LOW (ref 4.2–5.8)
RBC # BLD: 2.71 M/UL — LOW (ref 4.2–5.8)
RBC # FLD: 19.4 % — HIGH (ref 10.3–14.5)
RBC # FLD: 19.8 % — HIGH (ref 10.3–14.5)
RBC # FLD: 20.7 % — HIGH (ref 10.3–14.5)
SAO2 % BLDA: 99 % — SIGNIFICANT CHANGE UP
SODIUM SERPL-SCNC: 141 MMOL/L — SIGNIFICANT CHANGE UP (ref 135–145)
SODIUM SERPL-SCNC: 144 MMOL/L — SIGNIFICANT CHANGE UP (ref 135–145)
VIT B12 SERPL-MCNC: 1352 PG/ML — HIGH (ref 232–1245)
WBC # BLD: 6.63 K/UL — SIGNIFICANT CHANGE UP (ref 3.8–10.5)
WBC # BLD: 8.25 K/UL — SIGNIFICANT CHANGE UP (ref 3.8–10.5)
WBC # BLD: 8.86 K/UL — SIGNIFICANT CHANGE UP (ref 3.8–10.5)
WBC # FLD AUTO: 6.63 K/UL — SIGNIFICANT CHANGE UP (ref 3.8–10.5)
WBC # FLD AUTO: 8.25 K/UL — SIGNIFICANT CHANGE UP (ref 3.8–10.5)
WBC # FLD AUTO: 8.86 K/UL — SIGNIFICANT CHANGE UP (ref 3.8–10.5)

## 2021-04-29 PROCEDURE — 71045 X-RAY EXAM CHEST 1 VIEW: CPT | Mod: 26,77

## 2021-04-29 PROCEDURE — 71045 X-RAY EXAM CHEST 1 VIEW: CPT | Mod: 26

## 2021-04-29 RX ORDER — MIDAZOLAM HYDROCHLORIDE 1 MG/ML
1 INJECTION, SOLUTION INTRAMUSCULAR; INTRAVENOUS ONCE
Refills: 0 | Status: DISCONTINUED | OUTPATIENT
Start: 2021-04-29 | End: 2021-04-29

## 2021-04-29 RX ORDER — OLANZAPINE 15 MG/1
5 TABLET, FILM COATED ORAL
Refills: 0 | Status: DISCONTINUED | OUTPATIENT
Start: 2021-04-29 | End: 2021-05-08

## 2021-04-29 RX ORDER — POTASSIUM CHLORIDE 20 MEQ
40 PACKET (EA) ORAL EVERY 4 HOURS
Refills: 0 | Status: COMPLETED | OUTPATIENT
Start: 2021-04-29 | End: 2021-04-29

## 2021-04-29 RX ADMIN — CHLORHEXIDINE GLUCONATE 15 MILLILITER(S): 213 SOLUTION TOPICAL at 05:14

## 2021-04-29 RX ADMIN — FENTANYL CITRATE 4.2 MICROGRAM(S)/KG/HR: 50 INJECTION INTRAVENOUS at 04:00

## 2021-04-29 RX ADMIN — DEXMEDETOMIDINE HYDROCHLORIDE IN 0.9% SODIUM CHLORIDE 4.2 MICROGRAM(S)/KG/HR: 4 INJECTION INTRAVENOUS at 16:28

## 2021-04-29 RX ADMIN — Medication 40 MILLIEQUIVALENT(S): at 13:19

## 2021-04-29 RX ADMIN — Medication 40 MILLIEQUIVALENT(S): at 10:35

## 2021-04-29 RX ADMIN — PANTOPRAZOLE SODIUM 40 MILLIGRAM(S): 20 TABLET, DELAYED RELEASE ORAL at 17:36

## 2021-04-29 RX ADMIN — OLANZAPINE 2.5 MILLIGRAM(S): 15 TABLET, FILM COATED ORAL at 05:14

## 2021-04-29 RX ADMIN — DEXMEDETOMIDINE HYDROCHLORIDE IN 0.9% SODIUM CHLORIDE 4.2 MICROGRAM(S)/KG/HR: 4 INJECTION INTRAVENOUS at 11:49

## 2021-04-29 RX ADMIN — Medication 5 MILLIGRAM(S): at 05:14

## 2021-04-29 RX ADMIN — FENTANYL CITRATE 1 PATCH: 50 INJECTION INTRAVENOUS at 07:27

## 2021-04-29 RX ADMIN — CHLORHEXIDINE GLUCONATE 15 MILLILITER(S): 213 SOLUTION TOPICAL at 17:32

## 2021-04-29 RX ADMIN — Medication 4 MILLIGRAM(S): at 17:35

## 2021-04-29 RX ADMIN — CHLORHEXIDINE GLUCONATE 1 APPLICATION(S): 213 SOLUTION TOPICAL at 05:15

## 2021-04-29 RX ADMIN — DEXMEDETOMIDINE HYDROCHLORIDE IN 0.9% SODIUM CHLORIDE 4.2 MICROGRAM(S)/KG/HR: 4 INJECTION INTRAVENOUS at 05:13

## 2021-04-29 RX ADMIN — PANTOPRAZOLE SODIUM 40 MILLIGRAM(S): 20 TABLET, DELAYED RELEASE ORAL at 05:14

## 2021-04-29 RX ADMIN — Medication 40 MILLIGRAM(S): at 09:51

## 2021-04-29 RX ADMIN — OLANZAPINE 5 MILLIGRAM(S): 15 TABLET, FILM COATED ORAL at 17:35

## 2021-04-29 RX ADMIN — FENTANYL CITRATE 1 PATCH: 50 INJECTION INTRAVENOUS at 20:30

## 2021-04-29 RX ADMIN — Medication 4 MILLIGRAM(S): at 11:53

## 2021-04-29 RX ADMIN — Medication 1: at 17:35

## 2021-04-29 RX ADMIN — Medication 4 MILLIGRAM(S): at 07:43

## 2021-04-29 RX ADMIN — DEXMEDETOMIDINE HYDROCHLORIDE IN 0.9% SODIUM CHLORIDE 4.2 MICROGRAM(S)/KG/HR: 4 INJECTION INTRAVENOUS at 21:17

## 2021-04-29 RX ADMIN — Medication 1000 MILLIGRAM(S): at 11:11

## 2021-04-29 RX ADMIN — Medication 1 MILLIGRAM(S): at 20:50

## 2021-04-29 RX ADMIN — Medication 2 MILLIGRAM(S): at 01:23

## 2021-04-29 NOTE — PROGRESS NOTE ADULT - SUBJECTIVE AND OBJECTIVE BOX
INTERVAL HPI/OVERNIGHT EVENTS: ***    PRESSORS: [ ] YES [ ] NO  WHICH:    ANTIBIOTICS:                  DATE STARTED:  ANTIBIOTICS:                  DATE STARTED:  ANTIBIOTICS:                  DATE STARTED:    Antimicrobial:    Cardiovascular:    Pulmonary:  ALBUTerol    90 MICROgram(s) HFA Inhaler 2 Puff(s) Inhalation every 6 hours PRN    Hematalogic:    Other:  artificial  tears Solution 1 Drop(s) Both EYES every 4 hours PRN  ascorbic acid 1000 milliGRAM(s) Oral daily  chlorhexidine 0.12% Liquid 15 milliLiter(s) Oral Mucosa every 12 hours  chlorhexidine 2% Cloths 1 Application(s) Topical <User Schedule>  dexMEDEtomidine Infusion 0.2 MICROgram(s)/kG/Hr IV Continuous <Continuous>  diazepam    Tablet 5 milliGRAM(s) Oral every 6 hours  fentaNYL   Infusion. 0.5 MICROgram(s)/kG/Hr IV Continuous <Continuous>  fentaNYL   Patch  50 MICROgram(s)/Hr. 1 Patch Transdermal every 48 hours  insulin lispro (ADMELOG) corrective regimen sliding scale   SubCutaneous every 6 hours  OLANZapine 2.5 milliGRAM(s) Oral two times a day  pantoprazole  Injectable 40 milliGRAM(s) IV Push every 12 hours  sodium chloride 0.9% lock flush 10 milliLiter(s) IV Push every 1 hour PRN    ALBUTerol    90 MICROgram(s) HFA Inhaler 2 Puff(s) Inhalation every 6 hours PRN  artificial  tears Solution 1 Drop(s) Both EYES every 4 hours PRN  ascorbic acid 1000 milliGRAM(s) Oral daily  chlorhexidine 0.12% Liquid 15 milliLiter(s) Oral Mucosa every 12 hours  chlorhexidine 2% Cloths 1 Application(s) Topical <User Schedule>  dexMEDEtomidine Infusion 0.2 MICROgram(s)/kG/Hr IV Continuous <Continuous>  diazepam    Tablet 5 milliGRAM(s) Oral every 6 hours  fentaNYL   Infusion. 0.5 MICROgram(s)/kG/Hr IV Continuous <Continuous>  fentaNYL   Patch  50 MICROgram(s)/Hr. 1 Patch Transdermal every 48 hours  insulin lispro (ADMELOG) corrective regimen sliding scale   SubCutaneous every 6 hours  OLANZapine 2.5 milliGRAM(s) Oral two times a day  pantoprazole  Injectable 40 milliGRAM(s) IV Push every 12 hours  sodium chloride 0.9% lock flush 10 milliLiter(s) IV Push every 1 hour PRN    Drug Dosing Weight  Height (cm): 167.6 (23 Apr 2021 23:15)  Weight (kg): 83.9 (23 Apr 2021 23:15)  BMI (kg/m2): 29.9 (23 Apr 2021 23:15)  BSA (m2): 1.93 (23 Apr 2021 23:15)    CENTRAL LINE: [ ] YES [ ] NO  LOCATION:         RIVERA: [ ] YES [ ] NO          A-LINE:  [ ] YES [ ] NO  LOCATION:             ICU Vital Signs Last 24 Hrs  T(C): 37.1 (29 Apr 2021 04:00), Max: 37.6 (28 Apr 2021 19:14)  T(F): 98.8 (29 Apr 2021 04:00), Max: 99.7 (28 Apr 2021 19:14)  HR: 116 (29 Apr 2021 06:00) (61 - 149)  BP: 130/62 (29 Apr 2021 06:00) (87/43 - 169/82)  BP(mean): 76 (29 Apr 2021 06:00) (52 - 103)  ABP: 66/36 (28 Apr 2021 13:00) (57/30 - 184/93)  ABP(mean): 45 (28 Apr 2021 13:00) (38 - 134)  RR: 30 (29 Apr 2021 06:00) (22 - 47)  SpO2: 98% (29 Apr 2021 06:00) (93% - 100%)      ABG - ( 29 Apr 2021 03:45 )  pH, Arterial: 7.47  pH, Blood: x     /  pCO2: 57    /  pO2: 119   / HCO3: 42    / Base Excess: 15.1  /  SaO2: 99                    04-27 @ 07:01  -  04-28 @ 07:00  --------------------------------------------------------  IN: 1770.2 mL / OUT: 1930 mL / NET: -159.8 mL        Mode: AC/ CMV (Assist Control/ Continuous Mandatory Ventilation)  RR (machine): 28  TV (machine): 430  FiO2: 60  PEEP: 3  ITime: 1  MAP: 12  PIP: 45        PHYSICAL EXAM:    GENERAL: NAD  EYES: EOMI, PERRLA  NECK: Supple, No JVD; Normal thyroid; Trachea midline: No LAD   NERVOUS SYSTEM:  Alert & Oriented X3,  Motor Strength 5/5 B/L upper and lower extremities; DTRs 2+ intact and symmetric  CHEST/LUNG: No rales, rhonchi, wheezing, breath sounds present bilaterally  HEART: Regular rate and rhythm; No murmurs, no gallops  ABDOMEN: Soft, Nontender, Nondistended; Bowel sounds present, no pain or masses on palpation  : voiding well, Rivera in place  EXTREMITIES:  2+ Peripheral Pulses, No clubbing, cyanosis, or edema  SKIN: warm, intact, no lesions         LABS:  CBC Full  -  ( 29 Apr 2021 04:15 )  WBC Count : 6.63 K/uL  RBC Count : 1.95 M/uL  Hemoglobin : 7.5 g/dL  Hematocrit : 20.5 %  Platelet Count - Automated : 274 K/uL  Mean Cell Volume : 105.1 fl  Mean Cell Hemoglobin : 38.5 pg  Mean Cell Hemoglobin Concentration : 36.6 gm/dL  Auto Neutrophil # : x  Auto Lymphocyte # : x  Auto Monocyte # : x  Auto Eosinophil # : x  Auto Basophil # : x  Auto Neutrophil % : x  Auto Lymphocyte % : x  Auto Monocyte % : x  Auto Eosinophil % : x  Auto Basophil % : x    04-29    144  |  106  |  8   ----------------------------<  111<H>  3.2<L>   |  39<H>  |  0.21<L>    Ca    7.7<L>      29 Apr 2021 04:15  Phos  2.6     04-29  Mg     1.8     04-29    TPro  4.9<L>  /  Alb  1.5<L>  /  TBili  0.3  /  DBili  x   /  AST  22  /  ALT  16  /  AlkPhos  76  04-29    PT/INR - ( 28 Apr 2021 14:36 )   PT: 14.9 sec;   INR: 1.27 ratio                 RADIOLOGY & ADDITIONAL STUDIES REVIEWED:  ***    [ ]GOALS OF CARE DISCUSSION WITH PATIENT/FAMILY/PROXY:    CRITICAL CARE TIME SPENT: 35 minutes   INTERVAL HPI/OVERNIGHT EVENTS: Patient received one unit of PRBC on 4/28 with post transfusion hemoglobin 7.7 and today morning 7.5.     PRESSORS: [ ] YES [x ] NO    Antimicrobial:    Cardiovascular:    Pulmonary:  ALBUTerol    90 MICROgram(s) HFA Inhaler 2 Puff(s) Inhalation every 6 hours PRN    Hematalogic:    Other:  artificial  tears Solution 1 Drop(s) Both EYES every 4 hours PRN  ascorbic acid 1000 milliGRAM(s) Oral daily  chlorhexidine 0.12% Liquid 15 milliLiter(s) Oral Mucosa every 12 hours  chlorhexidine 2% Cloths 1 Application(s) Topical <User Schedule>  dexMEDEtomidine Infusion 0.2 MICROgram(s)/kG/Hr IV Continuous <Continuous>  diazepam    Tablet 5 milliGRAM(s) Oral every 6 hours  fentaNYL   Infusion. 0.5 MICROgram(s)/kG/Hr IV Continuous <Continuous>  fentaNYL   Patch  50 MICROgram(s)/Hr. 1 Patch Transdermal every 48 hours  insulin lispro (ADMELOG) corrective regimen sliding scale   SubCutaneous every 6 hours  OLANZapine 2.5 milliGRAM(s) Oral two times a day  pantoprazole  Injectable 40 milliGRAM(s) IV Push every 12 hours  sodium chloride 0.9% lock flush 10 milliLiter(s) IV Push every 1 hour PRN    ALBUTerol    90 MICROgram(s) HFA Inhaler 2 Puff(s) Inhalation every 6 hours PRN  artificial  tears Solution 1 Drop(s) Both EYES every 4 hours PRN  ascorbic acid 1000 milliGRAM(s) Oral daily  chlorhexidine 0.12% Liquid 15 milliLiter(s) Oral Mucosa every 12 hours  chlorhexidine 2% Cloths 1 Application(s) Topical <User Schedule>  dexMEDEtomidine Infusion 0.2 MICROgram(s)/kG/Hr IV Continuous <Continuous>  diazepam    Tablet 5 milliGRAM(s) Oral every 6 hours  fentaNYL   Infusion. 0.5 MICROgram(s)/kG/Hr IV Continuous <Continuous>  fentaNYL   Patch  50 MICROgram(s)/Hr. 1 Patch Transdermal every 48 hours  insulin lispro (ADMELOG) corrective regimen sliding scale   SubCutaneous every 6 hours  OLANZapine 2.5 milliGRAM(s) Oral two times a day  pantoprazole  Injectable 40 milliGRAM(s) IV Push every 12 hours  sodium chloride 0.9% lock flush 10 milliLiter(s) IV Push every 1 hour PRN    Drug Dosing Weight  Height (cm): 167.6 (23 Apr 2021 23:15)  Weight (kg): 83.9 (23 Apr 2021 23:15)  BMI (kg/m2): 29.9 (23 Apr 2021 23:15)  BSA (m2): 1.93 (23 Apr 2021 23:15)    CENTRAL LINE: [ ] YES [ ] NO  LOCATION:         RIVERA: [ ] YES [ ] NO          A-LINE:  [ ] YES [ ] NO  LOCATION:             ICU Vital Signs Last 24 Hrs  T(C): 37.1 (29 Apr 2021 04:00), Max: 37.6 (28 Apr 2021 19:14)  T(F): 98.8 (29 Apr 2021 04:00), Max: 99.7 (28 Apr 2021 19:14)  HR: 116 (29 Apr 2021 06:00) (61 - 149)  BP: 130/62 (29 Apr 2021 06:00) (87/43 - 169/82)  BP(mean): 76 (29 Apr 2021 06:00) (52 - 103)  ABP: 66/36 (28 Apr 2021 13:00) (57/30 - 184/93)  ABP(mean): 45 (28 Apr 2021 13:00) (38 - 134)  RR: 30 (29 Apr 2021 06:00) (22 - 47)  SpO2: 98% (29 Apr 2021 06:00) (93% - 100%)      ABG - ( 29 Apr 2021 03:45 )  pH, Arterial: 7.47  pH, Blood: x     /  pCO2: 57    /  pO2: 119   / HCO3: 42    / Base Excess: 15.1  /  SaO2: 99                    04-27 @ 07:01  -  04-28 @ 07:00  --------------------------------------------------------  IN: 1770.2 mL / OUT: 1930 mL / NET: -159.8 mL        Mode: AC/ CMV (Assist Control/ Continuous Mandatory Ventilation)  RR (machine): 28  TV (machine): 430  FiO2: 60  PEEP: 3  ITime: 1  MAP: 12  PIP: 45        PHYSICAL EXAM:    GENERAL: NAD  EYES: EOMI, PERRLA  NECK: Supple, No JVD; Normal thyroid; Trachea midline: No LAD   NERVOUS SYSTEM:  Alert & Oriented X3,  Motor Strength 5/5 B/L upper and lower extremities; DTRs 2+ intact and symmetric  CHEST/LUNG: No rales, rhonchi, wheezing, breath sounds present bilaterally  HEART: Regular rate and rhythm; No murmurs, no gallops  ABDOMEN: Soft, Nontender, Nondistended; Bowel sounds present, no pain or masses on palpation  : voiding well, Rivera in place  EXTREMITIES:  2+ Peripheral Pulses, No clubbing, cyanosis, or edema  SKIN: warm, intact, no lesions         LABS:  CBC Full  -  ( 29 Apr 2021 04:15 )  WBC Count : 6.63 K/uL  RBC Count : 1.95 M/uL  Hemoglobin : 7.5 g/dL  Hematocrit : 20.5 %  Platelet Count - Automated : 274 K/uL  Mean Cell Volume : 105.1 fl  Mean Cell Hemoglobin : 38.5 pg  Mean Cell Hemoglobin Concentration : 36.6 gm/dL  Auto Neutrophil # : x  Auto Lymphocyte # : x  Auto Monocyte # : x  Auto Eosinophil # : x  Auto Basophil # : x  Auto Neutrophil % : x  Auto Lymphocyte % : x  Auto Monocyte % : x  Auto Eosinophil % : x  Auto Basophil % : x    04-29    144  |  106  |  8   ----------------------------<  111<H>  3.2<L>   |  39<H>  |  0.21<L>    Ca    7.7<L>      29 Apr 2021 04:15  Phos  2.6     04-29  Mg     1.8     04-29    TPro  4.9<L>  /  Alb  1.5<L>  /  TBili  0.3  /  DBili  x   /  AST  22  /  ALT  16  /  AlkPhos  76  04-29    PT/INR - ( 28 Apr 2021 14:36 )   PT: 14.9 sec;   INR: 1.27 ratio                 RADIOLOGY & ADDITIONAL STUDIES REVIEWED:  ***    [ ]GOALS OF CARE DISCUSSION WITH PATIENT/FAMILY/PROXY:    CRITICAL CARE TIME SPENT: 35 minutes   INTERVAL HPI/OVERNIGHT EVENTS: Patient received one unit of PRBC on 4/28 with post transfusion hemoglobin 7.7 and today morning 7.5, Heme onc Dr Andrade consulted. Patient was restless at night, given ativan, discontinued valium, started ativan standing, increased Zyprexa to 5 mg daily, added prednisone 40 mg for possible organizing pneumonia as evidence on chest CT scan.     PRESSORS: [ ] YES [x ] NO    Antimicrobial:    Cardiovascular:    Pulmonary:  ALBUTerol    90 MICROgram(s) HFA Inhaler 2 Puff(s) Inhalation every 6 hours PRN    Hematalogic:    Other:  artificial  tears Solution 1 Drop(s) Both EYES every 4 hours PRN  ascorbic acid 1000 milliGRAM(s) Oral daily  chlorhexidine 0.12% Liquid 15 milliLiter(s) Oral Mucosa every 12 hours  chlorhexidine 2% Cloths 1 Application(s) Topical <User Schedule>  dexMEDEtomidine Infusion 0.2 MICROgram(s)/kG/Hr IV Continuous <Continuous>  diazepam    Tablet 5 milliGRAM(s) Oral every 6 hours  fentaNYL   Infusion. 0.5 MICROgram(s)/kG/Hr IV Continuous <Continuous>  fentaNYL   Patch  50 MICROgram(s)/Hr. 1 Patch Transdermal every 48 hours  insulin lispro (ADMELOG) corrective regimen sliding scale   SubCutaneous every 6 hours  OLANZapine 2.5 milliGRAM(s) Oral two times a day  pantoprazole  Injectable 40 milliGRAM(s) IV Push every 12 hours  sodium chloride 0.9% lock flush 10 milliLiter(s) IV Push every 1 hour PRN    ALBUTerol    90 MICROgram(s) HFA Inhaler 2 Puff(s) Inhalation every 6 hours PRN  artificial  tears Solution 1 Drop(s) Both EYES every 4 hours PRN  ascorbic acid 1000 milliGRAM(s) Oral daily  chlorhexidine 0.12% Liquid 15 milliLiter(s) Oral Mucosa every 12 hours  chlorhexidine 2% Cloths 1 Application(s) Topical <User Schedule>  dexMEDEtomidine Infusion 0.2 MICROgram(s)/kG/Hr IV Continuous <Continuous>  diazepam    Tablet 5 milliGRAM(s) Oral every 6 hours  fentaNYL   Infusion. 0.5 MICROgram(s)/kG/Hr IV Continuous <Continuous>  fentaNYL   Patch  50 MICROgram(s)/Hr. 1 Patch Transdermal every 48 hours  insulin lispro (ADMELOG) corrective regimen sliding scale   SubCutaneous every 6 hours  OLANZapine 2.5 milliGRAM(s) Oral two times a day  pantoprazole  Injectable 40 milliGRAM(s) IV Push every 12 hours  sodium chloride 0.9% lock flush 10 milliLiter(s) IV Push every 1 hour PRN    Drug Dosing Weight  Height (cm): 167.6 (23 Apr 2021 23:15)  Weight (kg): 83.9 (23 Apr 2021 23:15)  BMI (kg/m2): 29.9 (23 Apr 2021 23:15)  BSA (m2): 1.93 (23 Apr 2021 23:15)    CENTRAL LINE: [ ] YES [ ] NO  LOCATION:         RIVERA: [ ] YES [ ] NO          A-LINE:  [ ] YES [ ] NO  LOCATION:             ICU Vital Signs Last 24 Hrs  T(C): 37.1 (29 Apr 2021 04:00), Max: 37.6 (28 Apr 2021 19:14)  T(F): 98.8 (29 Apr 2021 04:00), Max: 99.7 (28 Apr 2021 19:14)  HR: 116 (29 Apr 2021 06:00) (61 - 149)  BP: 130/62 (29 Apr 2021 06:00) (87/43 - 169/82)  BP(mean): 76 (29 Apr 2021 06:00) (52 - 103)  ABP: 66/36 (28 Apr 2021 13:00) (57/30 - 184/93)  ABP(mean): 45 (28 Apr 2021 13:00) (38 - 134)  RR: 30 (29 Apr 2021 06:00) (22 - 47)  SpO2: 98% (29 Apr 2021 06:00) (93% - 100%)      ABG - ( 29 Apr 2021 03:45 )  pH, Arterial: 7.47  pH, Blood: x     /  pCO2: 57    /  pO2: 119   / HCO3: 42    / Base Excess: 15.1  /  SaO2: 99                    04-27 @ 07:01  -  04-28 @ 07:00  --------------------------------------------------------  IN: 1770.2 mL / OUT: 1930 mL / NET: -159.8 mL        Mode: AC/ CMV (Assist Control/ Continuous Mandatory Ventilation)  RR (machine): 28  TV (machine): 430  FiO2: 60  PEEP: 3  ITime: 1  MAP: 12  PIP: 45        PHYSICAL EXAM:    GENERAL: NAD, trach to vent sat above 97%  EYES: EOMI, PERRLA  NECK: Supple, No JVD; Normal thyroid; Trachea midline: No LAD, trach    NERVOUS SYSTEM: sedated, able to follow commands    CHEST/LUNG: No rales, rhonchi, wheezing, breath sounds present bilaterally  HEART: Regular rate and rhythm; No murmurs, no gallops  ABDOMEN: Soft, Nontender, Nondistended; Bowel sounds present, no pain or masses on palpation, G tube in place- functional, no signs of infection   : condom cath in place  EXTREMITIES:  2+ Peripheral Pulses, No clubbing or cyanosis. Bilateral upper extremity edema with ecchymosis   SKIN: warm, intact, no lesions         LABS:  CBC Full  -  ( 29 Apr 2021 04:15 )  WBC Count : 6.63 K/uL  RBC Count : 1.95 M/uL  Hemoglobin : 7.5 g/dL  Hematocrit : 20.5 %  Platelet Count - Automated : 274 K/uL  Mean Cell Volume : 105.1 fl  Mean Cell Hemoglobin : 38.5 pg  Mean Cell Hemoglobin Concentration : 36.6 gm/dL  Auto Neutrophil # : x  Auto Lymphocyte # : x  Auto Monocyte # : x  Auto Eosinophil # : x  Auto Basophil # : x  Auto Neutrophil % : x  Auto Lymphocyte % : x  Auto Monocyte % : x  Auto Eosinophil % : x  Auto Basophil % : x    04-29    144  |  106  |  8   ----------------------------<  111<H>  3.2<L>   |  39<H>  |  0.21<L>    Ca    7.7<L>      29 Apr 2021 04:15  Phos  2.6     04-29  Mg     1.8     04-29    TPro  4.9<L>  /  Alb  1.5<L>  /  TBili  0.3  /  DBili  x   /  AST  22  /  ALT  16  /  AlkPhos  76  04-29    PT/INR - ( 28 Apr 2021 14:36 )   PT: 14.9 sec;   INR: 1.27 ratio                 RADIOLOGY & ADDITIONAL STUDIES REVIEWED:  ***    [ ]GOALS OF CARE DISCUSSION WITH PATIENT/FAMILY/PROXY:    CRITICAL CARE TIME SPENT: 35 minutes

## 2021-04-29 NOTE — CHART NOTE - NSCHARTNOTEFT_GEN_A_CORE
Assessment:   Patient is a 55y old  Male who presents with a chief complaint of SOB (2021 10:12). Continues intubated. S/P trach/ PEG. Pt seen  and , TF infusing @ 40 ml/hr. (Jevity 1.5).      Factors impacting intake: [ ] none [ ] nausea  [ ] vomiting [ ] diarrhea [ ] constipation  [ ]chewing problems [ ] swallowing issues  [ ] other:     Diet Prescription: Diet, NPO with Tube Feed:   Tube Feeding Modality: Gastrostomy  Jevity 1.5 Leonardo  Total Volume for 24 Hours (mL): 960  Continuous  Starting Tube Feed Rate {mL per Hour}: 10  Increase Tube Feed Rate by (mL): 10     Every hour  Until Goal Tube Feed Rate (mL per Hour): 40  Tube Feed Duration (in Hours): 24  Tube Feed Start Time: 01:00 (21 @ 09:32)    Intake: TF order provides: 960 ml, 1440 kcals, 61 gm protein    Daily     Daily Weight in k (2021 08:00)  Weight in k.9 (2021 08:00)  Weight in k.6 (2021 08:00)  Weight in k (2021 07:30)  Weight in k (2021 07:30)  Weight in k.9 (2021 07:15)  Weight in k.3 (2021 07:30)  Weight in k.7 (2021 07:00)  Weight in k.7 (2021 08:00)  Weight in k.3 (2021 08:00)    % Weight Change: 3+ B/L arm edema, I>O    Pertinent Medications: MEDICATIONS  (STANDING):  ascorbic acid 1000 milliGRAM(s) Oral daily  chlorhexidine 0.12% Liquid 15 milliLiter(s) Oral Mucosa every 12 hours  chlorhexidine 2% Cloths 1 Application(s) Topical <User Schedule>  dexMEDEtomidine Infusion 0.2 MICROgram(s)/kG/Hr (4.2 mL/Hr) IV Continuous <Continuous>  fentaNYL   Infusion. 0.5 MICROgram(s)/kG/Hr (4.2 mL/Hr) IV Continuous <Continuous>  fentaNYL   Patch  50 MICROgram(s)/Hr. 1 Patch Transdermal every 48 hours  insulin lispro (ADMELOG) corrective regimen sliding scale   SubCutaneous every 6 hours  LORazepam   Injectable 4 milliGRAM(s) IV Push every 6 hours  OLANZapine 5 milliGRAM(s) Oral two times a day  pantoprazole  Injectable 40 milliGRAM(s) IV Push every 12 hours  predniSONE   Tablet 40 milliGRAM(s) Oral daily    MEDICATIONS  (PRN):  ALBUTerol    90 MICROgram(s) HFA Inhaler 2 Puff(s) Inhalation every 6 hours PRN Shortness of Breath and/or Wheezing  artificial  tears Solution 1 Drop(s) Both EYES every 4 hours PRN Dry Eyes  sodium chloride 0.9% lock flush 10 milliLiter(s) IV Push every 1 hour PRN Pre/post blood products, medications, blood draw, and to maintain line patency    Pertinent Labs:  Na144 mmol/L Glu 111 mg/dL<H> K+ 3.2 mmol/L<L> Cr  0.21 mg/dL<L> BUN 8 mg/dL  Phos 2.6 mg/dL  Alb 1.5 g/dL<L>  Chol 165 mg/dL LDL --    HDL 26 mg/dL<L> Trig 414 mg/dL<H>     CAPILLARY BLOOD GLUCOSE      POCT Blood Glucose.: 122 mg/dL (2021 18:28)  POCT Blood Glucose.: 106 mg/dL (2021 16:44)      Nutrition Diagnosis is [x ] severe PCM       Interventions:   Recommend  [ ] Change Diet To:  [x ] Nutrition Supplement: Add 1 Pkt Prosource BID (+30 gm protein, 120 kcals). MD to monitor. RD available.   [ ] Nutrition Support  [ ] Other:     Monitoring and Evaluation:   [ x ] Tolerance to diet prescription [ x ] weights [ x ] labs[ x ] follow up per protocol  [ ] other:

## 2021-04-29 NOTE — PROGRESS NOTE ADULT - ATTENDING COMMENTS
55 yr old  man , non smoker with  moody 1990s presented 3/14 with x9 days worsening cough, subjective fevers, and SOB, with x2-3 days dysuria and central, non-radiating, constant CP. Admitted to medicine unit  for acute hypoxic respiratory failure secondary to pna from covid-19 infection .     Assessment:  1. Acute hypoxic respiratory failure  2. Covid-19 infection   3. Transaminitis  4. Prediabetes  5. Bilateral pneumothorax  6. Septic shock - resolved  7. Anemia    Plan   -CT scan of the abdomen with out evidence of hematoma, no obvious signs of bleeding, stool remains brown colored  -Send hemolysis work up  -Hematology consult  -Trend hgb   -Transfuse to hgb > 7  -S/p tracheostomy placement 4/21   - S/p G-tube 4/23  -Continue tube feedings  -thoracic f/u noted   -Cont. mechanical ventilation   -Chest tube to suction  -Fentanyl path  -Ativan for benzo dependence  -monitor biomarkers daily, trending down   -Diarrhea is improved   -PT evaluation  -dvt/gi prophy  -hemodynamic monitoring   -Prognosis is guarded 55 yr old  man , non smoker with  moody 1990s presented 3/14 with x9 days worsening cough, subjective fevers, and SOB, with x2-3 days dysuria and central, non-radiating, constant CP. Admitted to medicine unit  for acute hypoxic respiratory failure secondary to pna from covid-19 infection .     Assessment:  1. Acute hypoxic respiratory failure  2. Covid-19 infection   3. Transaminitis  4. Prediabetes  5. Bilateral pneumothorax  6. Septic shock - resolved  7. Anemia    Plan   -CT scan of the abdomen with out evidence of hematoma, no obvious signs of bleeding, stool remains brown colored  -Send hemolysis work up  -Hematology consult  -Trend hgb   -Transfuse to hgb > 7  -S/p tracheostomy placement 4/21   - S/p G-tube 4/23  -Continue tube feedings  -thoracic f/u noted   -Cont. mechanical ventilation   -Chest tube to suction  -Add prednisone 40 mg for possible organizing pneumonia as evidence on CT scan  -Fentanyl path  -Ativan for benzo dependence  -monitor biomarkers daily, trending down   -Diarrhea is improved   -PT evaluation  -dvt/gi prophy  -hemodynamic monitoring   -Prognosis is guarded

## 2021-04-29 NOTE — PROGRESS NOTE ADULT - ASSESSMENT
Assessment and plan: 54 y/o M with no PMH is admitted to ICU for AHRF 2/2 covid19 pna     1. Acute hypoxic respiratory failure  2. ARDS 2/2 Covid pneumonia  3. Transaminitis  4. Prediabetes  5. Abnormal TSH    =================== Neuro============================  -Alert and oriented x 3 at baseline   -Intubated and sedated 3/29 on Vent  -Trach 4/22 continues on Vent    -Continue w/ fentanyl and Precedex   -C/w fentanyl patch, increased dose to 50 mcg   - Will give one dose of Versed to improve respiratory status   -Off propofol  -CT head unremarkable     ================= Cardiovascular==========================  # Hypotension improving  C/w midodrine 5 mg q8hrs   Off pressors     # TACHYCARDIA: recurrent episodes   -Lopressor PRN pushes   -HR in 110's  -Continue monitoring     ================- Pulm=================================  #Acute hypoxic respiratory failure: secondary to Covid19 pneumonia  #ARDS  -Was on HFNC then got intubated 3/29  -D-dimer initially elevated on presentation, trending down    -s/p Nimbex and Epifanio at different occasions for vent synchrony  -s/p Pigtail on left chest, removed on 4/15  -Large Pneumothorax was noted on 4/18 on left side, s/p chest tube placement to suction since 4/27, still has pneumothorax- resolving    -Trach 4/22 continues on Vent   -Remdesivir was discontinued due to positive antibodies   - Finished Dexamethasone   - Prednisone taper Finished  - Covid19 PCR negative now, off isolation       # Bacterial Pneumonia  - stable infiltrate on CXR ,likely developed VAP, Finished ABx Zosyn, meropenem, s/p 1 dose of Vancomycin  - MRSA negative from 3/26  - Sputum Cx growing few gram negative rods, CRE  - Pulm. Dr. Ryan  - ID Dr Anand       #Pneumothorax and pneumomediastinum:   -X-ray chest: remains with left pneumothorax  -Thoracic surgery following  -s/p Chest tube placed 4/8 pigtail to wall suction discontinued on 4/15  -On 4/18 chest tube was placed back on left lung to treat pneumothorax- resolving, now chest tube to suction    -CXR 4/26 still shows left lung pneumothorax - CXR 4/28 persistent pneumothorax         ==================ID===================================  Likely bacterial pneumonia   -leukocytosis resolved  -No more fever, On Tylenol PRN only   -Finished Meropenem  -plan as above     ================= Nephro================================  -Pitting edema to both arms, ecchymosis on right AC, blisters on left arm around brachial area    -on condom cath   -monitor I/Os , good urine output overall  -s/p Albumin x 2 days on 4/10  -Lasix 40mg BID with Albumin 25% Q6 for 48 hours to help with urine output and fluid status.( 4/15) Albumin finished , was D/jennifer on 4/19, urine output decreasing  - Patient can get Lasix as needed   - Metabolic Alkalosis likely due to compensation, improving post Diamox     =================GI====================================  Transaminitis:   likely secondary to Covid19   - and  on presentation   hepatitis panel -ve  -Improved, now normalized   -continue to monitor LFT    Diet:   S/p NGT (3/29) with tube feed  Off Bowel regimen, as patient is having frequent BM  G tube 4/23, resumed feeds    D/c rectal tube       ================ Heme==================================  Elevated d-dimer: likely secondary to Covid19   -D-dimer 423 on admission  -Last D-dimer 1434  - Off prophylactic Lovenox 40 mg daily for concern of bleed (drop in hb)    Anemia  On 4/26 Hb 8.8 dropped to 6.8, s/p 1 unit of PRBC repeat hb 7.2  On 4/27 Hb 7, s/p 2 units of PRBC hb remains 7  CTH and CT abdomen w/o contrast no evidence of bleed    No active signs of bleeding (No hematemesis, melena or hematuria)  F/u hemolysis w/u  F/u CBC q 6-8 hrs     =================Endocrine===============================  Prediabetes:  -A1c 5.8  -BS controlled  -continue HSS    Abnormal TSH:  -TSH level noted 0.26,   -Repeat TSH 0.37 and Free T4 1.82    ================= Skin/Catheters============================  No rashes. Peripheral IV lines.   Both arms with pitting edema, right AC with ecchymosis and left AC with blisters     - =================Prophylaxis =============================  Off Lovenox for DVT, on SCD   Protonix for GI proph    ==================GOC==================================   FULL CODE Assessment and plan: 56 y/o M with no PMH is admitted to ICU for AHRF 2/2 covid19 pna     1. Acute hypoxic respiratory failure  2. ARDS 2/2 Covid pneumonia  3. Transaminitis  4. Prediabetes  5. Abnormal TSH  6. Pneumothorax    =================== Neuro============================  -Alert and oriented x 3 at baseline   -Intubated and sedated 3/29 on Vent  -Trach 4/22 continues on Vent    -Continue w/ fentanyl and Precedex (titrating down)  -C/w fentanyl patch 50 mcg   -Started on Ativan   -D/c versed   -Off propofol   -CT head unremarkable     ================= Cardiovascular==========================  # Hypotension improving  -D/c midodrine 5 mg q8hrs   -Off pressors     # TACHYCARDIA: recurrent episodes   -Lopressor PRN pushes   -HR in 110's  -Continue monitoring     ================- Pulm=================================  #Acute hypoxic respiratory failure: secondary to Covid19 pneumonia  #ARDS  -Was on HFNC then got intubated 3/29  -D-dimer initially elevated on presentation, trending down    -s/p Nimbex and Epifanio at different occasions for vent synchrony  -s/p Pigtail on left chest, removed on 4/15  -Large Pneumothorax was noted on 4/18 on left side, s/p chest tube placement to suction since 4/27, still has pneumothorax-   -Trach 4/22 continues on Vent   -Remdesivir was discontinued due to positive antibodies   - Finished Dexamethasone   - Prednisone taper Finished  - Covid19 PCR negative now, off isolation   - Added prednisone 40 daily for possible organizing pneumonia       # Bacterial Pneumonia  - stable infiltrate on CXR ,likely developed VAP, Finished ABx Zosyn, meropenem, s/p 1 dose of Vancomycin  - MRSA negative from 3/26  - Sputum Cx growing few gram negative rods, CRE  - Pulm. Dr. Ryan  - ID Dr Anand       #Pneumothorax and pneumomediastinum:   -X-ray chest: remains with left pneumothorax  -Thoracic surgery following  -s/p Chest tube placed 4/8 pigtail to wall suction discontinued on 4/15  -On 4/18 chest tube was placed back on left lung to treat pneumothorax-  chest tube to suction    -CXR 4/26 still shows left lung pneumothorax - CXR 4/29 persistent pneumothorax         ==================ID===================================  Likely bacterial pneumonia   -leukocytosis resolved  -No more fever, On Tylenol PRN only   -Finished Meropenem  -plan as above     ================= Nephro================================  -Pitting edema to both arms, ecchymosis on right AC, blisters on left arm around brachial area    -on condom cath   -monitor I/Os , good urine output overall  -s/p Albumin x 2 days on 4/10  -Lasix 40mg BID with Albumin 25% Q6 for 48 hours to help with urine output and fluid status.( 4/15) Albumin finished , was D/jennifer on 4/19, urine output decreasing  - Patient can get Lasix as needed   - Metabolic Alkalosis likely due to compensation, improving post Diamox     =================GI====================================  Transaminitis:   likely secondary to Covid19   - and  on presentation   hepatitis panel -ve  -Improved, now normalized   -continue to monitor LFT    Diet:   S/p NGT (3/29) with tube feed  Off Bowel regimen, as patient is having frequent BM  G tube 4/23, resumed feeds    Off rectal tube       ================ Heme==================================  Elevated d-dimer: likely secondary to Covid19   -D-dimer 423 on admission  -Last D-dimer 1434  -Off prophylactic Lovenox 40 mg daily for concern of bleed (drop in hb)    Anemia  On 4/26 Hb 8.8 dropped to 6.8, s/p 1 unit of PRBC repeat hb 7.2  On 4/27 Hb 7, s/p 2 units of PRBC hb remains 7  On 4/28 Hb 6.8, s/p 1 unit of PRBC hb 7.5 now  4 PRBC total    CTH and CT abdomen w/o contrast no evidence of bleed    No active signs of bleeding (No hematemesis, melena or hematuria)  F/u hemolysis w/u- negative so far, elevated reticulocytes   Dr Andrade consulted   F/u CBC daily     =================Endocrine===============================  Prediabetes:  -A1c 5.8  -BS controlled  -continue HSS    Abnormal TSH:  -TSH level noted 0.26,   -Repeat TSH 0.37 and Free T4 1.82    ================= Skin/Catheters============================  No rashes. Peripheral IV lines.   Both arms with pitting edema, right AC with ecchymosis and left AC with blisters     - =================Prophylaxis =============================  Off Lovenox for DVT, on SCD   Protonix for GI proph    ==================GOC==================================   FULL CODE

## 2021-04-29 NOTE — CHART NOTE - NSCHARTNOTEFT_GEN_A_CORE
Called by ICU that patient had persistent pneumothorax with left pigtail in place. Per ICu tube was able to be flushed but not aspirated.  Patient seen and examined at bedside, unable to flush or aspirate tube fully, noted debris in tubing which was milked out. Once material removed from tubing able to aspirated and flush fully. Also changed dressing on pigtail which appears to be in good position. Pigtail not kinked or clamped.   Discussed with ICU, recommend repeating CXR to assess if PTX improved after flushing pigtail. Flush pigtail PRN.

## 2021-04-29 NOTE — PROGRESS NOTE ADULT - SUBJECTIVE AND OBJECTIVE BOX
Patient is a 55y old  Male who presents with a chief complaint of SOB (28 Apr 2021 11:38)    pt seen in icu [ x ], reg med floor [   ], bed [ x ], chair at bedside [   ], awake and responsive [x  ], sedated [  ],  nad [x  ]    andrea [ x ], g-tube feeding [x  ], tracheostomy tube [ x ], cent line [x  ],        Allergies    No Known Allergies        Vitals    T(F): 98.8 (04-29-21 @ 04:00), Max: 99.7 (04-28-21 @ 19:14)  HR: 116 (04-29-21 @ 06:00) (61 - 149)  BP: 130/62 (04-29-21 @ 06:00) (87/43 - 169/82)  RR: 30 (04-29-21 @ 06:00) (22 - 47)  SpO2: 98% (04-29-21 @ 06:00) (93% - 100%)  Wt(kg): --  CAPILLARY BLOOD GLUCOSE      POCT Blood Glucose.: 122 mg/dL (28 Apr 2021 18:28)      Labs                          7.5    6.63  )-----------( 274      ( 29 Apr 2021 04:15 )             20.5       04-29    144  |  106  |  8   ----------------------------<  111<H>  3.2<L>   |  39<H>  |  0.21<L>    Ca    7.7<L>      29 Apr 2021 04:15  Phos  2.6     04-29  Mg     1.8     04-29    TPro  4.9<L>  /  Alb  1.5<L>  /  TBili  0.3  /  DBili  x   /  AST  22  /  ALT  16  /  AlkPhos  76  04-29            .Sputum Sputum  04-16 @ 04:42   Moderate Enterobacter aerogenes (Carbapenem Resistant)  Normal Respiratory Monserrat present  --  Enterobacter aerogenes (Carbapenem Resistant)      .Urine Clean Catch (Midstream)  03-15 @ 00:52   No growth  --  --          Radiology Results      Meds    MEDICATIONS  (STANDING):  ascorbic acid 1000 milliGRAM(s) Oral daily  chlorhexidine 0.12% Liquid 15 milliLiter(s) Oral Mucosa every 12 hours  chlorhexidine 2% Cloths 1 Application(s) Topical <User Schedule>  dexMEDEtomidine Infusion 0.2 MICROgram(s)/kG/Hr (4.2 mL/Hr) IV Continuous <Continuous>  diazepam    Tablet 5 milliGRAM(s) Oral every 6 hours  fentaNYL   Infusion. 0.5 MICROgram(s)/kG/Hr (4.2 mL/Hr) IV Continuous <Continuous>  fentaNYL   Patch  50 MICROgram(s)/Hr. 1 Patch Transdermal every 48 hours  insulin lispro (ADMELOG) corrective regimen sliding scale   SubCutaneous every 6 hours  OLANZapine 2.5 milliGRAM(s) Oral two times a day  pantoprazole  Injectable 40 milliGRAM(s) IV Push every 12 hours      MEDICATIONS  (PRN):  ALBUTerol    90 MICROgram(s) HFA Inhaler 2 Puff(s) Inhalation every 6 hours PRN Shortness of Breath and/or Wheezing  artificial  tears Solution 1 Drop(s) Both EYES every 4 hours PRN Dry Eyes  sodium chloride 0.9% lock flush 10 milliLiter(s) IV Push every 1 hour PRN Pre/post blood products, medications, blood draw, and to maintain line patency      Physical Exam    Neuro :  no focal deficits  Respiratory: CTA B/L  CV: RRR, S1S2, no murmurs,   Abdominal: Soft, NT, ND +BS, g- tube in place, dressing cdi  Extremities: b/l ue edema, + peripheral pulses      ASSESSMENT    Hypoxemia 2nd to covid pna   transaminitis  prediabetes  h/o appendectomy  cholecystectomy        PLAN    contact and airborne isolation  d/c remdesevir given covid ab positive noted   completed dexamethasone   started pulse steroids for 3 days - 250mg solumedrol bid now tapered off 4/14/21  cont asa, vit c,    cont albuterol inhaler   pulm f/u  procalcitonin, D-dimer, crp, ldh, ferritin, lactate noted ,    tmx 99.4  cont tylenol prn,   cont robitussin prn   pt on fentanyl, precedex drip  s/p intubation 3/29/21   s/p tracheostomy 4/21/21  O2 sat  (90% - 100%) mech vent 60%  O2 via mech vent and taper fio2 as tolerated   vent mgmt as per icu  xray 3/19/21 with pneumomediastinum  rept cxr with New trace right apical pneumothorax. New mild left apical pneumothorax. Grossly stable small pneumomediastinum.  Soft tissue emphysema at the neck bases bilaterally. Grossly stable bilateral pulmonary infiltrates noted.   cxr 2/24 with No evidence of pneumothorax can be appreciated on the available image. This may be related to patient positioning. Evidence of pneumomediastinum and subcutaneous emphysema in the lower neck is again noted. There are patchy bibasilar infiltrates and elevated right hemidiaphragm noted.   cxr 3/29/21 with No significant change bilateral infiltrates. There is a small simple left apical pneumothorax. No significant pleural effusion. Bilateral subcutaneous emphysema similar to prior.   XRs on 7AM and 5PM with no obvious increase of ptx  cxr 4/4/21 with Improving bilateral airspace disease noted    cxr 4/8/21 with Small left pneumothorax noted.  thoracic surg f/u   s/p L chest tube replacement 4/18/21 for recurrence of left pneumothorax   cxr 4/20/21 with Unchanged advanced infiltrates and catheter left chest tube noted   CXR 4/11/21 with : No interval change compared to one day prior. No pneumothorax noted.   Daily CXR  Monitor O2 status   s/p tracheostomy 4/21/21   stay sutures out 2 weeks from OR date  cxr 4/21/21 with No evidence of active chest disease. Tracheostomy tube in place otherwise no significant change noted   cxr 4/25/21 with A 40% LEFT pneumothorax. LEFT multi-sidehole pigtail catheter overlies LEFT lower hemithorax.. Bilateral multifocal and diffuse ill-defined airspace opacities..  Follow-up AP portable chest radiograph 4/25/2021 AT 8:58 AM: Residual LEFT 30% upper zone pneumothorax. Otherwise no interval change.noted above..  s/p surgical placement of gastrostomy tube 4/23/2021.   pt started on tube feeding  id f/u   No need for further antibiotics as patient completed course of Meropenem  leukocytosis resolved  speutum cx with Enterobacter aerogenes (Carbapenem Resistant) noted above  andrea   rectal tube  lispro ss   prognosis poor   s/p 3 units prbc for anemia  f/u h/h   cont current meds  mgmt as per icu        Patient is a 55y old  Male who presents with a chief complaint of SOB (28 Apr 2021 11:38)    pt seen in icu [ x ], reg med floor [   ], bed [ x ], chair at bedside [   ], awake and responsive [  ], mildly sedated, open eyes spontaneously [ x ],  nad [x  ]    andrea [ x ], g-tube feeding [x  ], tracheostomy tube [ x ], cent line [x  ],        Allergies    No Known Allergies        Vitals    T(F): 98.8 (04-29-21 @ 04:00), Max: 99.7 (04-28-21 @ 19:14)  HR: 116 (04-29-21 @ 06:00) (61 - 149)  BP: 130/62 (04-29-21 @ 06:00) (87/43 - 169/82)  RR: 30 (04-29-21 @ 06:00) (22 - 47)  SpO2: 98% (04-29-21 @ 06:00) (93% - 100%)  Wt(kg): --  CAPILLARY BLOOD GLUCOSE      POCT Blood Glucose.: 122 mg/dL (28 Apr 2021 18:28)      Labs                          7.5    6.63  )-----------( 274      ( 29 Apr 2021 04:15 )             20.5       04-29    144  |  106  |  8   ----------------------------<  111<H>  3.2<L>   |  39<H>  |  0.21<L>    Ca    7.7<L>      29 Apr 2021 04:15  Phos  2.6     04-29  Mg     1.8     04-29    TPro  4.9<L>  /  Alb  1.5<L>  /  TBili  0.3  /  DBili  x   /  AST  22  /  ALT  16  /  AlkPhos  76  04-29            .Sputum Sputum  04-16 @ 04:42   Moderate Enterobacter aerogenes (Carbapenem Resistant)  Normal Respiratory Monserrat present  --  Enterobacter aerogenes (Carbapenem Resistant)      .Urine Clean Catch (Midstream)  03-15 @ 00:52   No growth  --  --          Radiology Results      Meds    MEDICATIONS  (STANDING):  ascorbic acid 1000 milliGRAM(s) Oral daily  chlorhexidine 0.12% Liquid 15 milliLiter(s) Oral Mucosa every 12 hours  chlorhexidine 2% Cloths 1 Application(s) Topical <User Schedule>  dexMEDEtomidine Infusion 0.2 MICROgram(s)/kG/Hr (4.2 mL/Hr) IV Continuous <Continuous>  diazepam    Tablet 5 milliGRAM(s) Oral every 6 hours  fentaNYL   Infusion. 0.5 MICROgram(s)/kG/Hr (4.2 mL/Hr) IV Continuous <Continuous>  fentaNYL   Patch  50 MICROgram(s)/Hr. 1 Patch Transdermal every 48 hours  insulin lispro (ADMELOG) corrective regimen sliding scale   SubCutaneous every 6 hours  OLANZapine 2.5 milliGRAM(s) Oral two times a day  pantoprazole  Injectable 40 milliGRAM(s) IV Push every 12 hours      MEDICATIONS  (PRN):  ALBUTerol    90 MICROgram(s) HFA Inhaler 2 Puff(s) Inhalation every 6 hours PRN Shortness of Breath and/or Wheezing  artificial  tears Solution 1 Drop(s) Both EYES every 4 hours PRN Dry Eyes  sodium chloride 0.9% lock flush 10 milliLiter(s) IV Push every 1 hour PRN Pre/post blood products, medications, blood draw, and to maintain line patency      Physical Exam    Neuro :  no focal deficits  Respiratory: CTA B/L  CV: RRR, S1S2, no murmurs,   Abdominal: Soft, NT, ND +BS, g- tube in place, dressing cdi  Extremities: b/l ue edema, + peripheral pulses      ASSESSMENT    Hypoxemia 2nd to covid pna   transaminitis  prediabetes  h/o appendectomy  cholecystectomy        PLAN    contact and airborne isolation  d/c remdesevir given covid ab positive noted   completed dexamethasone   started pulse steroids for 3 days - 250mg solumedrol bid now tapered off 4/14/21  cont asa, vit c,    cont albuterol inhaler   pulm f/u  procalcitonin, D-dimer, crp, ldh, ferritin, lactate noted ,    tmx 99.7  cont tylenol prn,   cont robitussin prn   pt on fentanyl, precedex drip   off pressors  s/p intubation 3/29/21   s/p tracheostomy 4/21/21  O2 sat  (93% - 100%) mech vent 60%  O2 via mech vent and taper fio2 as tolerated   vent mgmt as per icu  xray 3/19/21 with pneumomediastinum  rept cxr with New trace right apical pneumothorax. New mild left apical pneumothorax. Grossly stable small pneumomediastinum.  Soft tissue emphysema at the neck bases bilaterally. Grossly stable bilateral pulmonary infiltrates noted.   cxr 2/24 with No evidence of pneumothorax can be appreciated on the available image. This may be related to patient positioning. Evidence of pneumomediastinum and subcutaneous emphysema in the lower neck is again noted. There are patchy bibasilar infiltrates and elevated right hemidiaphragm noted.   cxr 3/29/21 with No significant change bilateral infiltrates. There is a small simple left apical pneumothorax. No significant pleural effusion. Bilateral subcutaneous emphysema similar to prior.   XRs on 7AM and 5PM with no obvious increase of ptx  cxr 4/4/21 with Improving bilateral airspace disease noted    cxr 4/8/21 with Small left pneumothorax noted.  thoracic surg f/u   s/p L chest tube replacement 4/18/21 for recurrence of left pneumothorax   cxr 4/20/21 with Unchanged advanced infiltrates and catheter left chest tube noted   CXR 4/11/21 with : No interval change compared to one day prior. No pneumothorax noted.   Daily CXR  Monitor O2 status   s/p tracheostomy 4/21/21   stay sutures out 2 weeks from OR date  cxr 4/21/21 with No evidence of active chest disease. Tracheostomy tube in place otherwise no significant change noted   cxr 4/25/21 with A 40% LEFT pneumothorax. LEFT multi-sidehole pigtail catheter overlies LEFT lower hemithorax.. Bilateral multifocal and diffuse ill-defined airspace opacities..  Follow-up AP portable chest radiograph 4/25/2021 AT 8:58 AM: Residual LEFT 30% upper zone pneumothorax. Otherwise no interval change.noted above..  s/p surgical placement of gastrostomy tube 4/23/2021.   cont on tube feeding  id f/u   No need for further antibiotics as patient completed course of Meropenem  leukocytosis resolved  speutum cx with Enterobacter aerogenes (Carbapenem Resistant) noted above  andrea   rectal tube  lispro ss   prognosis poor   s/p 3 units prbc for anemia  f/u h/h   supplement potassium for hypokalemia  cont current meds  mgmt as per icu        Patient is a 55y old  Male who presents with a chief complaint of SOB (28 Apr 2021 11:38)    pt seen in icu [ x ], reg med floor [   ], bed [ x ], chair at bedside [   ], awake and responsive [  ], mildly sedated, open eyes spontaneously [ x ],  nad [x  ]    andrea [ x ], g-tube feeding [x  ], tracheostomy tube [ x ], cent line [x  ],        Allergies    No Known Allergies        Vitals    T(F): 98.8 (04-29-21 @ 04:00), Max: 99.7 (04-28-21 @ 19:14)  HR: 116 (04-29-21 @ 06:00) (61 - 149)  BP: 130/62 (04-29-21 @ 06:00) (87/43 - 169/82)  RR: 30 (04-29-21 @ 06:00) (22 - 47)  SpO2: 98% (04-29-21 @ 06:00) (93% - 100%)  Wt(kg): --  CAPILLARY BLOOD GLUCOSE      POCT Blood Glucose.: 122 mg/dL (28 Apr 2021 18:28)      Labs                          7.5    6.63  )-----------( 274      ( 29 Apr 2021 04:15 )             20.5       04-29    144  |  106  |  8   ----------------------------<  111<H>  3.2<L>   |  39<H>  |  0.21<L>    Ca    7.7<L>      29 Apr 2021 04:15  Phos  2.6     04-29  Mg     1.8     04-29    TPro  4.9<L>  /  Alb  1.5<L>  /  TBili  0.3  /  DBili  x   /  AST  22  /  ALT  16  /  AlkPhos  76  04-29            .Sputum Sputum  04-16 @ 04:42   Moderate Enterobacter aerogenes (Carbapenem Resistant)  Normal Respiratory Monserrat present  --  Enterobacter aerogenes (Carbapenem Resistant)      .Urine Clean Catch (Midstream)  03-15 @ 00:52   No growth  --  --          Radiology Results      Meds    MEDICATIONS  (STANDING):  ascorbic acid 1000 milliGRAM(s) Oral daily  chlorhexidine 0.12% Liquid 15 milliLiter(s) Oral Mucosa every 12 hours  chlorhexidine 2% Cloths 1 Application(s) Topical <User Schedule>  dexMEDEtomidine Infusion 0.2 MICROgram(s)/kG/Hr (4.2 mL/Hr) IV Continuous <Continuous>  diazepam    Tablet 5 milliGRAM(s) Oral every 6 hours  fentaNYL   Infusion. 0.5 MICROgram(s)/kG/Hr (4.2 mL/Hr) IV Continuous <Continuous>  fentaNYL   Patch  50 MICROgram(s)/Hr. 1 Patch Transdermal every 48 hours  insulin lispro (ADMELOG) corrective regimen sliding scale   SubCutaneous every 6 hours  OLANZapine 2.5 milliGRAM(s) Oral two times a day  pantoprazole  Injectable 40 milliGRAM(s) IV Push every 12 hours      MEDICATIONS  (PRN):  ALBUTerol    90 MICROgram(s) HFA Inhaler 2 Puff(s) Inhalation every 6 hours PRN Shortness of Breath and/or Wheezing  artificial  tears Solution 1 Drop(s) Both EYES every 4 hours PRN Dry Eyes  sodium chloride 0.9% lock flush 10 milliLiter(s) IV Push every 1 hour PRN Pre/post blood products, medications, blood draw, and to maintain line patency      Physical Exam    Neuro :  no focal deficits  Respiratory: CTA B/L  CV: RRR, S1S2, no murmurs,   Abdominal: Soft, NT, ND +BS, g- tube in place, dressing cdi  Extremities: b/l ue edema, + peripheral pulses      ASSESSMENT    Hypoxemia 2nd to covid pna   transaminitis  prediabetes  h/o appendectomy  cholecystectomy        PLAN    contact and airborne isolation  d/c remdesevir given covid ab positive noted   completed dexamethasone   started pulse steroids for 3 days - 250mg solumedrol bid now tapered off 4/14/21  cont asa, vit c,    cont albuterol inhaler   pulm f/u  procalcitonin, D-dimer, crp, ldh, ferritin, lactate noted ,    tmx 99.7  cont tylenol prn,   cont robitussin prn   pt on fentanyl, precedex drip   off pressors  s/p intubation 3/29/21   s/p tracheostomy 4/21/21  O2 sat  (93% - 100%) mech vent 60%  O2 via mech vent and taper fio2 as tolerated   vent mgmt as per icu  xray 3/19/21 with pneumomediastinum  rept cxr with New trace right apical pneumothorax. New mild left apical pneumothorax. Grossly stable small pneumomediastinum.  Soft tissue emphysema at the neck bases bilaterally. Grossly stable bilateral pulmonary infiltrates noted.   cxr 2/24 with No evidence of pneumothorax can be appreciated on the available image. This may be related to patient positioning. Evidence of pneumomediastinum and subcutaneous emphysema in the lower neck is again noted. There are patchy bibasilar infiltrates and elevated right hemidiaphragm noted.   cxr 3/29/21 with No significant change bilateral infiltrates. There is a small simple left apical pneumothorax. No significant pleural effusion. Bilateral subcutaneous emphysema similar to prior.   XRs on 7AM and 5PM with no obvious increase of ptx  cxr 4/4/21 with Improving bilateral airspace disease noted    cxr 4/8/21 with Small left pneumothorax noted.  thoracic surg f/u   s/p L chest tube replacement 4/18/21 for recurrence of left pneumothorax   cxr 4/20/21 with Unchanged advanced infiltrates and catheter left chest tube noted   CXR 4/11/21 with : No interval change compared to one day prior. No pneumothorax noted.   Daily CXR  Monitor O2 status   s/p tracheostomy 4/21/21   stay sutures out 2 weeks from OR date  cxr 4/21/21 with No evidence of active chest disease. Tracheostomy tube in place otherwise no significant change noted   cxr 4/25/21 with A 40% LEFT pneumothorax. LEFT multi-sidehole pigtail catheter overlies LEFT lower hemithorax.. Bilateral multifocal and diffuse ill-defined airspace opacities..  Follow-up AP portable chest radiograph 4/25/2021 AT 8:58 AM: Residual LEFT 30% upper zone pneumothorax. Otherwise no interval change.noted above..  s/p surgical placement of gastrostomy tube 4/23/2021.   cont on tube feeding  id f/u   No need for further antibiotics as patient completed course of Meropenem  leukocytosis resolved  speutum cx with Enterobacter aerogenes (Carbapenem Resistant) noted above  andrea   rectal tube  lispro ss   prognosis poor   s/p 4 units prbc for anemia  f/u h/h   supplement potassium for hypokalemia  cont current meds  mgmt as per icu

## 2021-04-29 NOTE — PROGRESS NOTE ADULT - SUBJECTIVE AND OBJECTIVE BOX
Patient is a 55y old  Male who presents with a chief complaint of SOB (29 Apr 2021 06:51)  Patient is awake, not alert, laying in bed in NAD. Still on ventilator via trach    INTERVAL HPI/OVERNIGHT EVENTS:      VITAL SIGNS:  T(F): 98.8 (04-29-21 @ 04:00)  HR: 104 (04-29-21 @ 10:00)  BP: 126/51 (04-29-21 @ 10:00)  RR: 25 (04-29-21 @ 10:00)  SpO2: 97% (04-29-21 @ 10:00)  Wt(kg): --  I&O's Detail    28 Apr 2021 07:01  -  29 Apr 2021 07:00  --------------------------------------------------------  IN:    Dexmedetomidine: 283.8 mL    Dexmedetomidine: 206.6 mL    Enteral Tube Flush: 110 mL    FentaNYL: 363.3 mL    FentaNYL: 200 mL    Glucerna 1.5: 670 mL    IV PiggyBack: 100 mL  Total IN: 1933.7 mL    OUT:    Chest Tube (mL): 60 mL    Incontinent per Condom Catheter (mL): 750 mL  Total OUT: 810 mL    Total NET: 1123.7 mL        Mode: AC/ CMV (Assist Control/ Continuous Mandatory Ventilation)  RR (machine): 28  TV (machine): 430  FiO2: 90  PEEP: 3  ITime: 1  MAP: 12  PIP: 42        REVIEW OF SYSTEMS:    CONSTITUTIONAL:  No fevers, chills, sweats    HEENT:  Eyes:  No diplopia or blurred vision. ENT:  No earache, sore throat or runny nose.    CARDIOVASCULAR:  No pressure, squeezing, tightness, or heaviness about the chest; no palpitations.    RESPIRATORY:  Per HPI    GASTROINTESTINAL:  No abdominal pain, nausea, vomiting or diarrhea.    GENITOURINARY:  No dysuria, frequency or urgency.    NEUROLOGIC:  No paresthesias, fasciculations, seizures or weakness.    PSYCHIATRIC:  No disorder of thought or mood.      PHYSICAL EXAM:    Constitutional: Well developed and nourished  Eyes:Perrla  ENMT: normal  Neck:supple  Respiratory: good air entry  Cardiovascular: S1 S2 regular  Gastrointestinal: Soft, Non tender  Extremities: + edema  Vascular:normal  Neurological:Awake, not alert  Musculoskeletal:Normal      MEDICATIONS  (STANDING):  ascorbic acid 1000 milliGRAM(s) Oral daily  chlorhexidine 0.12% Liquid 15 milliLiter(s) Oral Mucosa every 12 hours  chlorhexidine 2% Cloths 1 Application(s) Topical <User Schedule>  dexMEDEtomidine Infusion 0.2 MICROgram(s)/kG/Hr (4.2 mL/Hr) IV Continuous <Continuous>  fentaNYL   Infusion. 0.5 MICROgram(s)/kG/Hr (4.2 mL/Hr) IV Continuous <Continuous>  fentaNYL   Patch  50 MICROgram(s)/Hr. 1 Patch Transdermal every 48 hours  insulin lispro (ADMELOG) corrective regimen sliding scale   SubCutaneous every 6 hours  LORazepam   Injectable 4 milliGRAM(s) IV Push every 6 hours  OLANZapine 5 milliGRAM(s) Oral two times a day  pantoprazole  Injectable 40 milliGRAM(s) IV Push every 12 hours  potassium chloride   Powder 40 milliEquivalent(s) Oral every 4 hours  predniSONE   Tablet 40 milliGRAM(s) Oral daily    MEDICATIONS  (PRN):  ALBUTerol    90 MICROgram(s) HFA Inhaler 2 Puff(s) Inhalation every 6 hours PRN Shortness of Breath and/or Wheezing  artificial  tears Solution 1 Drop(s) Both EYES every 4 hours PRN Dry Eyes  sodium chloride 0.9% lock flush 10 milliLiter(s) IV Push every 1 hour PRN Pre/post blood products, medications, blood draw, and to maintain line patency      Allergies    No Known Allergies    Intolerances        LABS:                        7.5    6.63  )-----------( 274      ( 29 Apr 2021 04:15 )             20.5     04-29    144  |  106  |  8   ----------------------------<  111<H>  3.2<L>   |  39<H>  |  0.21<L>    Ca    7.7<L>      29 Apr 2021 04:15  Phos  2.6     04-29  Mg     1.8     04-29    TPro  4.9<L>  /  Alb  1.5<L>  /  TBili  0.3  /  DBili  x   /  AST  22  /  ALT  16  /  AlkPhos  76  04-29    PT/INR - ( 28 Apr 2021 14:36 )   PT: 14.9 sec;   INR: 1.27 ratio             ABG - ( 29 Apr 2021 03:45 )  pH, Arterial: 7.47  pH, Blood: x     /  pCO2: 57    /  pO2: 119   / HCO3: 42    / Base Excess: 15.1  /  SaO2: 99                    CAPILLARY BLOOD GLUCOSE      POCT Blood Glucose.: 122 mg/dL (28 Apr 2021 18:28)  POCT Blood Glucose.: 106 mg/dL (28 Apr 2021 16:44)    pro-bnp -- 04-26 @ 05:42     d-dimer 1434  04-26 @ 05:42  pro-bnp -- 04-23 @ 06:21     d-dimer 1097  04-23 @ 06:21      RADIOLOGY & ADDITIONAL TESTS:    CXR:  < from: Xray Chest 1 View- PORTABLE-Routine (Xray Chest 1 View- PORTABLE-Routine in AM.) (04.28.21 @ 09:58) >  IMPRESSION:  Tracheostomy tube again noted. Left chest tube again noted with small left apical pneumothorax, increased in size.    Bilateral airspace opacities again noted improving on the left.    Heart size cannot be accurately assessed in this projection.      < end of copied text >    Ct scan chest:    ekg;    echo:

## 2021-04-29 NOTE — PROGRESS NOTE ADULT - ASSESSMENT
Assessment and Recommendation:   Problem/Recommendation - 1:  Problem: COVID-19. Recommendation: isolation precautions  Cont mechanical ventilation - S.P trach.   Wean as tolerated  Tracheal care  Pulmonary toilet  Supplemental nutrition  ICU management.   Monitor oxygen sat  Monitor LFT, LDH, CRP, D-Dimer, Ferritin and procalcitonin  Vit C, D and zinc supp  Montelukast 10 mgs po Qhs  Daily CXR   G-tube with feeds  Replace lytes  DVT and GI PPX     Problem/Recommendation - 2:  ·  Problem: UTI (urinary tract infection).  Recommendation: F.U cultures   Off Antibiotics.     Problem/Recommendation - 3:  ·  Problem: Chest pain.  Recommendation: likely related to Covid-19 infection.     Problem/Recommendation - 4:  ·  Problem: Transaminitis.  Recommendation: monitor LFTs  GI F/U     Problem/Recommendation - 5:  ·  Problem: Pneumothorax.  Recommendation: Surgicel removed.   Thoracic sx follow up  Daily  CXR   Management per ICU     Problem/Recommendation - 6:  ·  Problem: Anemia.  Recommendation: PRBC PRN   Monitor labs  Monitor for bleeding

## 2021-04-30 LAB
ALBUMIN SERPL ELPH-MCNC: 1.6 G/DL — LOW (ref 3.5–5)
ALP SERPL-CCNC: 78 U/L — SIGNIFICANT CHANGE UP (ref 40–120)
ALT FLD-CCNC: 18 U/L DA — SIGNIFICANT CHANGE UP (ref 10–60)
ANION GAP SERPL CALC-SCNC: 0 MMOL/L — LOW (ref 5–17)
AST SERPL-CCNC: 17 U/L — SIGNIFICANT CHANGE UP (ref 10–40)
BASE EXCESS BLDA CALC-SCNC: 13.8 MMOL/L — HIGH (ref -2–3)
BILIRUB SERPL-MCNC: 0.5 MG/DL — SIGNIFICANT CHANGE UP (ref 0.2–1.2)
BLOOD GAS COMMENTS ARTERIAL: SIGNIFICANT CHANGE UP
BUN SERPL-MCNC: 13 MG/DL — SIGNIFICANT CHANGE UP (ref 7–18)
CALCIUM SERPL-MCNC: 7.8 MG/DL — LOW (ref 8.4–10.5)
CHLORIDE SERPL-SCNC: 107 MMOL/L — SIGNIFICANT CHANGE UP (ref 96–108)
CO2 SERPL-SCNC: 37 MMOL/L — HIGH (ref 22–31)
CREAT SERPL-MCNC: 0.27 MG/DL — LOW (ref 0.5–1.3)
GLUCOSE BLDC GLUCOMTR-MCNC: 108 MG/DL — HIGH (ref 70–99)
GLUCOSE BLDC GLUCOMTR-MCNC: 112 MG/DL — HIGH (ref 70–99)
GLUCOSE BLDC GLUCOMTR-MCNC: 113 MG/DL — HIGH (ref 70–99)
GLUCOSE BLDC GLUCOMTR-MCNC: 118 MG/DL — HIGH (ref 70–99)
GLUCOSE BLDC GLUCOMTR-MCNC: 121 MG/DL — HIGH (ref 70–99)
GLUCOSE BLDC GLUCOMTR-MCNC: 125 MG/DL — HIGH (ref 70–99)
GLUCOSE BLDC GLUCOMTR-MCNC: 125 MG/DL — HIGH (ref 70–99)
GLUCOSE BLDC GLUCOMTR-MCNC: 153 MG/DL — HIGH (ref 70–99)
GLUCOSE BLDC GLUCOMTR-MCNC: 175 MG/DL — HIGH (ref 70–99)
GLUCOSE BLDC GLUCOMTR-MCNC: 199 MG/DL — HIGH (ref 70–99)
GLUCOSE SERPL-MCNC: 144 MG/DL — HIGH (ref 70–99)
HCO3 BLDA-SCNC: 41 MMOL/L — HIGH (ref 21–28)
HCT VFR BLD CALC: 26.4 % — LOW (ref 39–50)
HGB BLD-MCNC: 8.3 G/DL — LOW (ref 13–17)
HOROWITZ INDEX BLDA+IHG-RTO: 60 — SIGNIFICANT CHANGE UP
MAGNESIUM SERPL-MCNC: 2 MG/DL — SIGNIFICANT CHANGE UP (ref 1.6–2.6)
MCHC RBC-ENTMCNC: 31.4 GM/DL — LOW (ref 32–36)
MCHC RBC-ENTMCNC: 32.2 PG — SIGNIFICANT CHANGE UP (ref 27–34)
MCV RBC AUTO: 102.3 FL — HIGH (ref 80–100)
NRBC # BLD: 0 /100 WBCS — SIGNIFICANT CHANGE UP (ref 0–0)
PCO2 BLDA: 60 MMHG — HIGH (ref 35–48)
PH BLDA: 7.44 — SIGNIFICANT CHANGE UP (ref 7.35–7.45)
PHOSPHATE SERPL-MCNC: 2.9 MG/DL — SIGNIFICANT CHANGE UP (ref 2.5–4.5)
PLATELET # BLD AUTO: 305 K/UL — SIGNIFICANT CHANGE UP (ref 150–400)
PO2 BLDA: 81 MMHG — LOW (ref 83–108)
POTASSIUM SERPL-MCNC: 3.7 MMOL/L — SIGNIFICANT CHANGE UP (ref 3.5–5.3)
POTASSIUM SERPL-SCNC: 3.7 MMOL/L — SIGNIFICANT CHANGE UP (ref 3.5–5.3)
PROT SERPL-MCNC: 5.5 G/DL — LOW (ref 6–8.3)
RBC # BLD: 2.58 M/UL — LOW (ref 4.2–5.8)
RBC # FLD: 19.4 % — HIGH (ref 10.3–14.5)
SAO2 % BLDA: 99 % — SIGNIFICANT CHANGE UP
SODIUM SERPL-SCNC: 144 MMOL/L — SIGNIFICANT CHANGE UP (ref 135–145)
WBC # BLD: 9.74 K/UL — SIGNIFICANT CHANGE UP (ref 3.8–10.5)
WBC # FLD AUTO: 9.74 K/UL — SIGNIFICANT CHANGE UP (ref 3.8–10.5)

## 2021-04-30 PROCEDURE — 71045 X-RAY EXAM CHEST 1 VIEW: CPT | Mod: 26

## 2021-04-30 RX ORDER — FENTANYL CITRATE 50 UG/ML
1 INJECTION INTRAVENOUS
Refills: 0 | Status: DISCONTINUED | OUTPATIENT
Start: 2021-04-30 | End: 2021-05-02

## 2021-04-30 RX ORDER — FENTANYL CITRATE 50 UG/ML
50 INJECTION INTRAVENOUS ONCE
Refills: 0 | Status: DISCONTINUED | OUTPATIENT
Start: 2021-04-30 | End: 2021-04-30

## 2021-04-30 RX ORDER — POLYETHYLENE GLYCOL 3350 17 G/17G
17 POWDER, FOR SOLUTION ORAL DAILY
Refills: 0 | Status: DISCONTINUED | OUTPATIENT
Start: 2021-04-30 | End: 2021-05-01

## 2021-04-30 RX ORDER — HYDROMORPHONE HYDROCHLORIDE 2 MG/ML
1 INJECTION INTRAMUSCULAR; INTRAVENOUS; SUBCUTANEOUS ONCE
Refills: 0 | Status: DISCONTINUED | OUTPATIENT
Start: 2021-04-30 | End: 2021-04-30

## 2021-04-30 RX ADMIN — FENTANYL CITRATE 1 PATCH: 50 INJECTION INTRAVENOUS at 07:26

## 2021-04-30 RX ADMIN — DEXMEDETOMIDINE HYDROCHLORIDE IN 0.9% SODIUM CHLORIDE 4.2 MICROGRAM(S)/KG/HR: 4 INJECTION INTRAVENOUS at 14:53

## 2021-04-30 RX ADMIN — Medication 4 MILLIGRAM(S): at 01:07

## 2021-04-30 RX ADMIN — FENTANYL CITRATE 1 PATCH: 50 INJECTION INTRAVENOUS at 12:27

## 2021-04-30 RX ADMIN — Medication 4 MILLIGRAM(S): at 06:09

## 2021-04-30 RX ADMIN — OLANZAPINE 5 MILLIGRAM(S): 15 TABLET, FILM COATED ORAL at 06:10

## 2021-04-30 RX ADMIN — DEXMEDETOMIDINE HYDROCHLORIDE IN 0.9% SODIUM CHLORIDE 4.2 MICROGRAM(S)/KG/HR: 4 INJECTION INTRAVENOUS at 04:30

## 2021-04-30 RX ADMIN — CHLORHEXIDINE GLUCONATE 15 MILLILITER(S): 213 SOLUTION TOPICAL at 17:06

## 2021-04-30 RX ADMIN — Medication 1: at 12:15

## 2021-04-30 RX ADMIN — PANTOPRAZOLE SODIUM 40 MILLIGRAM(S): 20 TABLET, DELAYED RELEASE ORAL at 17:06

## 2021-04-30 RX ADMIN — Medication 4 MILLIGRAM(S): at 20:13

## 2021-04-30 RX ADMIN — OLANZAPINE 5 MILLIGRAM(S): 15 TABLET, FILM COATED ORAL at 17:06

## 2021-04-30 RX ADMIN — DEXMEDETOMIDINE HYDROCHLORIDE IN 0.9% SODIUM CHLORIDE 4.2 MICROGRAM(S)/KG/HR: 4 INJECTION INTRAVENOUS at 01:07

## 2021-04-30 RX ADMIN — PANTOPRAZOLE SODIUM 40 MILLIGRAM(S): 20 TABLET, DELAYED RELEASE ORAL at 06:09

## 2021-04-30 RX ADMIN — Medication 1000 MILLIGRAM(S): at 11:35

## 2021-04-30 RX ADMIN — Medication 4 MILLIGRAM(S): at 17:06

## 2021-04-30 RX ADMIN — CHLORHEXIDINE GLUCONATE 15 MILLILITER(S): 213 SOLUTION TOPICAL at 06:10

## 2021-04-30 RX ADMIN — DEXMEDETOMIDINE HYDROCHLORIDE IN 0.9% SODIUM CHLORIDE 4.2 MICROGRAM(S)/KG/HR: 4 INJECTION INTRAVENOUS at 22:01

## 2021-04-30 RX ADMIN — FENTANYL CITRATE 1 PATCH: 50 INJECTION INTRAVENOUS at 19:41

## 2021-04-30 RX ADMIN — CHLORHEXIDINE GLUCONATE 1 APPLICATION(S): 213 SOLUTION TOPICAL at 06:10

## 2021-04-30 RX ADMIN — FENTANYL CITRATE 50 MICROGRAM(S): 50 INJECTION INTRAVENOUS at 23:12

## 2021-04-30 RX ADMIN — POLYETHYLENE GLYCOL 3350 17 GRAM(S): 17 POWDER, FOR SOLUTION ORAL at 11:35

## 2021-04-30 RX ADMIN — HYDROMORPHONE HYDROCHLORIDE 1 MILLIGRAM(S): 2 INJECTION INTRAMUSCULAR; INTRAVENOUS; SUBCUTANEOUS at 23:44

## 2021-04-30 RX ADMIN — FENTANYL CITRATE 4.2 MICROGRAM(S)/KG/HR: 50 INJECTION INTRAVENOUS at 01:06

## 2021-04-30 RX ADMIN — Medication 1: at 16:27

## 2021-04-30 RX ADMIN — DEXMEDETOMIDINE HYDROCHLORIDE IN 0.9% SODIUM CHLORIDE 4.2 MICROGRAM(S)/KG/HR: 4 INJECTION INTRAVENOUS at 18:24

## 2021-04-30 RX ADMIN — Medication 10 MILLIGRAM(S): at 11:35

## 2021-04-30 RX ADMIN — FENTANYL CITRATE 1 PATCH: 50 INJECTION INTRAVENOUS at 11:23

## 2021-04-30 RX ADMIN — Medication 4 MILLIGRAM(S): at 11:35

## 2021-04-30 RX ADMIN — Medication 40 MILLIGRAM(S): at 06:11

## 2021-04-30 RX ADMIN — Medication 2 MILLIGRAM(S): at 23:12

## 2021-04-30 NOTE — CONSULT NOTE ADULT - ASSESSMENT
55 year old male with COVID pneumonia has been on vent.  he also has CT for Pnx.  His H/H has dropped recently without obvious bleeding.  he is off antibiotics.    1. worsening pneumonia  no blood in ET tube, unlikely intrapulmonary bleeding    2. anemia, retic is elevated.  Haptoglobin normal  ?bleeding  arms are very swollen and ecchymotic  ?bleeding in arms  abdomen is not distended    3. 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

## 2021-04-30 NOTE — CONSULT NOTE ADULT - SUBJECTIVE AND OBJECTIVE BOX
Patient is a 55y old  Male who presents with a chief complaint of SOB (30 Apr 2021 07:02)      HPI:  55M, no PMH, PSH appy and moody 1990s presented 3/14 with x9 days worsening cough, subjective fevers, and SOB, with x2-3 days dysuria and central, non-radiating, constant CP. Additionally endorses HA, LH, and myaglias, and denies abdominal pain and n/v/d. Patient is a nonsmoker, lives alone, and denies sick contacts. In ED, T 99.1, HR 76, /65, RR 18, and SPO2 98%. Given discomfort of breathing, placed on 4L NC, with improvement. Labs notable for lymphopenia and neutrophilia despite WBC wnl, d-dimer 423, AST//114, lactate 3.3, and . Trop negative x1. +COVID. CXR notable for b/l infiltrates, L>R. Given x1 dose Tylenol and 1L IVF bolus in ED. Admitted to [facility] for management of COVID PNA.  (14 Mar 2021 16:07)  He was intubated and trach was done.  he also had Pnx required pigtail chest tube.  His H/H dropped rapidly in the last few days.  he had 4 u PRBC in the last 3 days.  No obvious bleeding.       ROS:  Negative except for:    PAST MEDICAL & SURGICAL HISTORY:  No pertinent past medical history    S/P chole 1990s    S/P appendectomy  1990s        SOCIAL HISTORY:    FAMILY HISTORY:      MEDICATIONS  (STANDING):  ascorbic acid 1000 milliGRAM(s) Oral daily  chlorhexidine 0.12% Liquid 15 milliLiter(s) Oral Mucosa every 12 hours  chlorhexidine 2% Cloths 1 Application(s) Topical <User Schedule>  dexMEDEtomidine Infusion 0.2 MICROgram(s)/kG/Hr (4.2 mL/Hr) IV Continuous <Continuous>  fentaNYL   Infusion. 0.5 MICROgram(s)/kG/Hr (4.2 mL/Hr) IV Continuous <Continuous>  fentaNYL   Patch  50 MICROgram(s)/Hr. 1 Patch Transdermal every 48 hours  insulin lispro (ADMELOG) corrective regimen sliding scale   SubCutaneous every 6 hours  LORazepam   Injectable 4 milliGRAM(s) IV Push every 6 hours  OLANZapine 5 milliGRAM(s) Oral two times a day  pantoprazole  Injectable 40 milliGRAM(s) IV Push every 12 hours  predniSONE   Tablet 40 milliGRAM(s) Oral daily    MEDICATIONS  (PRN):  ALBUTerol    90 MICROgram(s) HFA Inhaler 2 Puff(s) Inhalation every 6 hours PRN Shortness of Breath and/or Wheezing  artificial  tears Solution 1 Drop(s) Both EYES every 4 hours PRN Dry Eyes  sodium chloride 0.9% lock flush 10 milliLiter(s) IV Push every 1 hour PRN Pre/post blood products, medications, blood draw, and to maintain line patency      Allergies    No Known Allergies    Intolerances        Vital Signs Last 24 Hrs  T(C): 37.5 (30 Apr 2021 06:00), Max: 37.7 (29 Apr 2021 19:00)  T(F): 99.5 (30 Apr 2021 06:00), Max: 99.9 (29 Apr 2021 19:00)  HR: 81 (30 Apr 2021 09:00) (65 - 129)  BP: 121/57 (30 Apr 2021 09:00) (93/44 - 165/71)  BP(mean): 70 (30 Apr 2021 09:00) (53 - 95)  RR: 31 (30 Apr 2021 09:00) (23 - 41)  SpO2: 96% (30 Apr 2021 09:00) (84% - 99%)    PHYSICAL EXAM  General: adult in NAD  HEENT: clear oropharynx, anicteric sclera, pink conjunctiva  Neck: supple  CV: normal S1/S2 with no murmur rubs or gallops  Lungs: positive air movement b/l ant lungs,clear to auscultation, no wheezes, no rales  Abdomen: soft non-tender non-distended, no hepatosplenomegaly  Ext: no clubbing cyanosis or edema  Skin: no rashes and no petechiae  Neuro: alert and oriented X 4, no focal deficits      LABS:                          8.3    9.74  )-----------( 305      ( 30 Apr 2021 06:15 )             26.4         Mean Cell Volume : 102.3 fl  Mean Cell Hemoglobin : 32.2 pg  Mean Cell Hemoglobin Concentration : 31.4 gm/dL  Auto Neutrophil # : x  Auto Lymphocyte # : x  Auto Monocyte # : x  Auto Eosinophil # : x  Auto Basophil # : x  Auto Neutrophil % : x  Auto Lymphocyte % : x  Auto Monocyte % : x  Auto Eosinophil % : x  Auto Basophil % : x      Serial CBC's  04-30 @ 06:15  Hct-26.4 / Hgb-8.3 / Plat-305 / RBC-2.58 / WBC-9.74  Serial CBC's  04-29 @ 15:44  Hct-26.0 / Hgb-8.7 / Plat-313 / RBC-2.71 / WBC-8.25  Serial CBC's  04-29 @ 04:15  Hct-20.5 / Hgb-7.5 / Plat-274 / RBC-1.95 / WBC-6.63  Serial CBC's  04-28 @ 23:46  Hct-24.2 / Hgb-7.7 / Plat-201 / RBC-2.40 / WBC-8.86  Serial CBC's  04-28 @ 14:36  Hct-19.9 / Hgb-6.8 / Plat-311 / RBC-1.78 / WBC-9.49  Serial CBC's  04-28 @ 03:54  Hct-23.2 / Hgb-7.1 / Plat-351 / RBC-2.21 / WBC-9.91  Serial CBC's  04-27 @ 16:10  Hct-20.7 / Hgb-7.1 / Plat-331 / RBC-1.88 / WBC-12.43  Serial CBC's  04-27 @ 04:08  Hct-20.6 / Hgb-7.0 / Plat-290 / RBC-1.88 / WBC-9.50  Serial CBC's  04-26 @ 17:14  Hct-21.7 / Hgb-7.2 / Plat-243 / RBC-1.98 / WBC-8.30      04-30    144  |  107  |  13  ----------------------------<  144<H>  3.7   |  37<H>  |  0.27<L>    Ca    7.8<L>      30 Apr 2021 06:15  Phos  2.9     04-30  Mg     2.0     04-30    TPro  5.5<L>  /  Alb  1.6<L>  /  TBili  0.5  /  DBili  x   /  AST  17  /  ALT  18  /  AlkPhos  78  04-30      PT/INR - ( 28 Apr 2021 14:36 )   PT: 14.9 sec;   INR: 1.27 ratio             Folate, Serum: 11.9 ng/mL (04-29 @ 01:30)  Vitamin B12, Serum: 1352 pg/mL (04-29 @ 01:30)  Reticulocyte Percent: 10.3 % (04-28 @ 14:36)              BLOOD SMEAR INTERPRETATION:       RADIOLOGY & ADDITIONAL STUDIES:  < from: Xray Chest 1 View- PORTABLE-Routine (Xray Chest 1 View- PORTABLE-Routine in AM.) (04.29.21 @ 09:22) >  INTERPRETATION:  CLINICAL STATEMENT: Follow-up chest pain.    TECHNIQUE: AP view of the chest.    COMPARISON: 4/28/2021    FINDINGS/  IMPRESSION:  Tracheostomy tube, left chest tube again noted. Small to moderate-sized left apical pneumothorax unchanged.    Bilateral airspace opacities overall worsening.    Heart size cannot be accurately assessed in this projection.    < end of copied text >

## 2021-04-30 NOTE — PROGRESS NOTE ADULT - ASSESSMENT
Assessment and plan: 54 y/o M with no PMH is admitted to ICU for AHRF 2/2 covid19 pna     1. Acute hypoxic respiratory failure  2. ARDS 2/2 Covid pneumonia  3. Transaminitis  4. Prediabetes  5. Abnormal TSH  6. Pneumothorax    =================== Neuro============================  -Alert and oriented x 3 at baseline   -Intubated and sedated 3/29 on Vent  -Trach 4/22 continues on Vent    -Continue Precedex and off Fentanyl drip   -C/w fentanyl patch 50 mcg   -Started on Ativan   -D/c versed   -Off propofol   -CT head unremarkable     ================= Cardiovascular==========================  # Hypotension improving  -D/c midodrine 5 mg q8hrs   -Off pressors     # TACHYCARDIA: recurrent episodes   -Lopressor PRN pushes   -HR in 110's  -Continue monitoring     ================- Pulm=================================  #Acute hypoxic respiratory failure: secondary to Covid19 pneumonia  #ARDS  -Was on HFNC then got intubated 3/29  -D-dimer initially elevated on presentation, trending down    -s/p Nimbex and Epifanio at different occasions for vent synchrony  -s/p Pigtail on left chest, removed on 4/15  -Large Pneumothorax was noted on 4/18 on left side, s/p chest tube placement to suction since 4/27, still has pneumothorax-   -Trach 4/22 continues on Vent   -Remdesivir was discontinued due to positive antibodies   - Finished Dexamethasone   - Prednisone taper Finished  - Covid19 PCR negative now, off isolation   - Added prednisone 40 daily for possible organizing pneumonia       # Bacterial Pneumonia  - stable infiltrate on CXR ,likely developed VAP, Finished ABx Zosyn, meropenem, s/p 1 dose of Vancomycin  - MRSA negative from 3/26  - Sputum Cx growing few gram negative rods, CRE  - Pulm. Dr. Ryan  - ID Dr Anand       #Pneumothorax and pneumomediastinum:   -X-ray chest: remains with left pneumothorax  -Thoracic surgery following  -s/p Chest tube placed 4/8 pigtail to wall suction discontinued on 4/15  -On 4/18 chest tube was placed back on left lung to treat pneumothorax-  chest tube to suction    -CXR 4/26 still shows left lung pneumothorax - CXR 4/29 persistent pneumothorax         ==================ID===================================  Likely bacterial pneumonia   -leukocytosis resolved  -No more fever, On Tylenol PRN only   -Finished Meropenem  -plan as above     ================= Nephro================================  -Pitting edema to both arms, ecchymosis on right AC, blisters on left arm around brachial area    -on condom cath   -monitor I/Os , good urine output overall  -s/p Albumin x 2 days on 4/10  -Lasix 40mg BID with Albumin 25% Q6 for 48 hours to help with urine output and fluid status.( 4/15) Albumin finished , was D/jennifer on 4/19, urine output decreasing  - Patient can get Lasix as needed   - Metabolic Alkalosis likely due to compensation, improving post Diamox     =================GI====================================  Transaminitis:   likely secondary to Covid19   - and  on presentation   hepatitis panel -ve  -Improved, now normalized   -continue to monitor LFT    Diet:   S/p NGT (3/29) with tube feed  Off Bowel regimen, as patient is having frequent BM  G tube 4/23, resumed feeds    Off rectal tube       ================ Heme==================================  Elevated d-dimer: likely secondary to Covid19   -D-dimer 423 on admission  -Last D-dimer 1434  -Off prophylactic Lovenox 40 mg daily for concern of bleed (drop in hb)    Anemia  On 4/26 Hb 8.8 dropped to 6.8, s/p 1 unit of PRBC repeat hb 7.2  On 4/27 Hb 7, s/p 2 units of PRBC hb remains 7  On 4/28 Hb 6.8, s/p 1 unit of PRBC hb 7.5 now  4 PRBC total    CTH and CT abdomen w/o contrast no evidence of bleed    No active signs of bleeding (No hematemesis, melena or hematuria)  F/u hemolysis w/u- negative so far, elevated reticulocytes   Dr Raymond loza   F/u CBC daily     =================Endocrine===============================  Prediabetes:  -A1c 5.8  -BS controlled  -continue HSS    Abnormal TSH:  -TSH level noted 0.26,   -Repeat TSH 0.37 and Free T4 1.82    ================= Skin/Catheters============================  No rashes. Peripheral IV lines.   Both arms with pitting edema, right AC with ecchymosis and left AC with blisters     - =================Prophylaxis =============================  Off Lovenox for DVT, on SCD   Protonix for GI proph    ==================GOC==================================   FULL CODE Assessment and plan: 54 y/o M with no PMH is admitted to ICU for AHRF 2/2 covid19 pna     1. Acute hypoxic respiratory failure  2. ARDS 2/2 Covid pneumonia  3. Transaminitis  4. Prediabetes  5. Abnormal TSH  6. Pneumothorax    =================== Neuro============================  -Alert and oriented x 3 at baseline   -Intubated and sedated 3/29 on Vent  -Trach 4/22 continues on Vent    -Continue Precedex and off Fentanyl drip   -C/w fentanyl patch 50 mcg   -Started on Ativan   -D/c versed   -Off propofol   -CT head unremarkable     ================= Cardiovascular==========================  # Hypotension improving  -D/c midodrine 5 mg q8hrs   -Off pressors     # TACHYCARDIA: recurrent episodes   -Lopressor PRN pushes   -HR in 110's  -Continue monitoring     ================- Pulm=================================  #Acute hypoxic respiratory failure: secondary to Covid19 pneumonia  #ARDS  -Was on HFNC then got intubated 3/29  -D-dimer initially elevated on presentation, trending down    -s/p Nimbex and Epifanio at different occasions for vent synchrony  -s/p Pigtail on left chest, removed on 4/15  -Large Pneumothorax was noted on 4/18 on left side, s/p chest tube placement to suction since 4/27, still has pneumothorax-   -Trach 4/22 continues on Vent   -Remdesivir was discontinued due to positive antibodies   - Finished Dexamethasone   - Prednisone taper Finished  - Covid19 PCR negative now, off isolation   - Added prednisone 40 daily for possible organizing pneumonia       # Bacterial Pneumonia  - Stable infiltrate on CXR ,likely developed VAP, Finished ABx Zosyn, meropenem, s/p 1 dose of Vancomycin  - MRSA negative from 3/26  - Sputum Cx growing few gram negative rods, CRE  - Pulm. Dr. Ryan  - ID Dr Anand       #Pneumothorax and pneumomediastinum:   -X-ray chest: remains with left pneumothorax  -Thoracic surgery following  -s/p Chest tube placed 4/8 pigtail to wall suction discontinued on 4/15  -On 4/18 chest tube was placed back on left lung to treat pneumothorax-  chest tube to suction    -CXR 4/26 still shows left lung pneumothorax - CXR 4/29 persistent pneumothorax         ==================ID===================================  Likely bacterial pneumonia   -leukocytosis resolved  -No more fever, On Tylenol PRN only   -Finished Meropenem  -plan as above     ================= Nephro================================  -Pitting edema to both arms, ecchymosis on right AC, blisters on left arm around brachial area    -on condom cath   -monitor I/Os , good urine output overall  -s/p Albumin x 2 days on 4/10  -Lasix 40mg BID with Albumin 25% Q6 for 48 hours to help with urine output and fluid status.( 4/15) Albumin finished , was D/jennifer on 4/19, urine output decreasing  - Patient can get Lasix as needed   - Metabolic Alkalosis likely due to compensation, improving post Diamox     =================GI====================================  Transaminitis:   likely secondary to Covid19   - and  on presentation   hepatitis panel -ve  -Improved, now normalized   -continue to monitor LFT    Diet:   S/p NGT (3/29) with tube feed  Resumed bowel regimen   G tube 4/23, on tube feeds    Off rectal tube       ================ Heme==================================  Elevated d-dimer: likely secondary to Covid19   -D-dimer 423 on admission  -Last D-dimer 1434  -Off prophylactic Lovenox 40 mg daily for concern of bleed (drop in hb)    Anemia  On 4/26 Hb 8.8 dropped to 6.8, s/p 1 unit of PRBC repeat hb 7.2  On 4/27 Hb 7, s/p 2 units of PRBC hb remains 7  On 4/28 Hb 6.8, s/p 1 unit of PRBC hb 7.5 now  4 PRBC total    CTH and CT abdomen w/o contrast no evidence of bleed    No active signs of bleeding (No hematemesis, melena or hematuria)  F/u hemolysis w/u- negative so far, elevated reticulocytes   Dr Andrade on board   F/u CBC daily     =================Endocrine===============================  Prediabetes:  -A1c 5.8  -BS controlled  -continue HSS    Abnormal TSH:  -TSH level noted 0.26,   -Repeat TSH 0.37 and Free T4 1.82    ================= Skin/Catheters============================  No rashes. Peripheral IV lines.   Both arms with pitting edema, right AC with ecchymosis and left AC with blisters     - =================Prophylaxis =============================  Off Lovenox for DVT, on SCD   Protonix for GI proph    ==================GOC==================================   FULL CODE

## 2021-04-30 NOTE — PROGRESS NOTE ADULT - ATTENDING COMMENTS
55 yr old  man , non smoker with  moody 1990s presented 3/14 with x9 days worsening cough, subjective fevers, and SOB, with x2-3 days dysuria and central, non-radiating, constant CP. Admitted to medicine unit  for acute hypoxic respiratory failure secondary to pna from covid-19 infection .     Assessment:  1. Acute hypoxic respiratory failure  2. Covid-19 infection   3. Transaminitis  4. Prediabetes  5. Bilateral pneumothorax  6. Septic shock - resolved  7. Anemia    Plan   -Hgb stable  -Hematology consult appreciated  -Transfuse to hgb > 7  -S/p tracheostomy placement 4/21   - S/p G-tube 4/23  -Continue tube feedings  -thoracic f/u noted   -Cont. mechanical ventilation   -Chest tube to suction  -Cont. prednisone 40 mg for possible organizing pneumonia as evidence on CT scan  -Fentanyl path, d/c fentanyl effusion  -Ativan for benzo dependence  -monitor biomarkers daily, trending down   -Diarrhea is improved   -PT evaluation  -dvt/gi prophy  -hemodynamic monitoring   -Prognosis is guarded

## 2021-04-30 NOTE — PROGRESS NOTE ADULT - SUBJECTIVE AND OBJECTIVE BOX
INTERVAL HPI/OVERNIGHT EVENTS: ***    PRESSORS: [ ] YES [ ] NO  WHICH:    ANTIBIOTICS:                  DATE STARTED:  ANTIBIOTICS:                  DATE STARTED:  ANTIBIOTICS:                  DATE STARTED:    Antimicrobial:    Cardiovascular:    Pulmonary:  ALBUTerol    90 MICROgram(s) HFA Inhaler 2 Puff(s) Inhalation every 6 hours PRN    Hematalogic:    Other:  artificial  tears Solution 1 Drop(s) Both EYES every 4 hours PRN  ascorbic acid 1000 milliGRAM(s) Oral daily  chlorhexidine 0.12% Liquid 15 milliLiter(s) Oral Mucosa every 12 hours  chlorhexidine 2% Cloths 1 Application(s) Topical <User Schedule>  dexMEDEtomidine Infusion 0.2 MICROgram(s)/kG/Hr IV Continuous <Continuous>  fentaNYL   Infusion. 0.5 MICROgram(s)/kG/Hr IV Continuous <Continuous>  fentaNYL   Patch  50 MICROgram(s)/Hr. 1 Patch Transdermal every 48 hours  insulin lispro (ADMELOG) corrective regimen sliding scale   SubCutaneous every 6 hours  LORazepam   Injectable 4 milliGRAM(s) IV Push every 6 hours  OLANZapine 5 milliGRAM(s) Oral two times a day  pantoprazole  Injectable 40 milliGRAM(s) IV Push every 12 hours  predniSONE   Tablet 40 milliGRAM(s) Oral daily  sodium chloride 0.9% lock flush 10 milliLiter(s) IV Push every 1 hour PRN    ALBUTerol    90 MICROgram(s) HFA Inhaler 2 Puff(s) Inhalation every 6 hours PRN  artificial  tears Solution 1 Drop(s) Both EYES every 4 hours PRN  ascorbic acid 1000 milliGRAM(s) Oral daily  chlorhexidine 0.12% Liquid 15 milliLiter(s) Oral Mucosa every 12 hours  chlorhexidine 2% Cloths 1 Application(s) Topical <User Schedule>  dexMEDEtomidine Infusion 0.2 MICROgram(s)/kG/Hr IV Continuous <Continuous>  fentaNYL   Infusion. 0.5 MICROgram(s)/kG/Hr IV Continuous <Continuous>  fentaNYL   Patch  50 MICROgram(s)/Hr. 1 Patch Transdermal every 48 hours  insulin lispro (ADMELOG) corrective regimen sliding scale   SubCutaneous every 6 hours  LORazepam   Injectable 4 milliGRAM(s) IV Push every 6 hours  OLANZapine 5 milliGRAM(s) Oral two times a day  pantoprazole  Injectable 40 milliGRAM(s) IV Push every 12 hours  predniSONE   Tablet 40 milliGRAM(s) Oral daily  sodium chloride 0.9% lock flush 10 milliLiter(s) IV Push every 1 hour PRN    Drug Dosing Weight  Height (cm): 167.6 (23 Apr 2021 23:15)  Weight (kg): 83.9 (23 Apr 2021 23:15)  BMI (kg/m2): 29.9 (23 Apr 2021 23:15)  BSA (m2): 1.93 (23 Apr 2021 23:15)    CENTRAL LINE: [ ] YES [ ] NO  LOCATION:         RIVERA: [ ] YES [ ] NO          A-LINE:  [ ] YES [ ] NO  LOCATION:             ICU Vital Signs Last 24 Hrs  T(C): 37.5 (30 Apr 2021 06:00), Max: 37.7 (29 Apr 2021 19:00)  T(F): 99.5 (30 Apr 2021 06:00), Max: 99.9 (29 Apr 2021 19:00)  HR: 77 (30 Apr 2021 06:00) (74 - 129)  BP: 102/38 (30 Apr 2021 06:00) (96/46 - 165/71)  BP(mean): 53 (30 Apr 2021 06:00) (53 - 95)  ABP: --  ABP(mean): --  RR: 38 (30 Apr 2021 06:00) (23 - 41)  SpO2: 92% (30 Apr 2021 06:00) (84% - 99%)      ABG - ( 30 Apr 2021 04:32 )  pH, Arterial: 7.44  pH, Blood: x     /  pCO2: 60    /  pO2: 81    / HCO3: 41    / Base Excess: 13.8  /  SaO2: 99                    04-29 @ 07:01  -  04-30 @ 07:00  --------------------------------------------------------  IN: 1955.3 mL / OUT: 1410 mL / NET: 545.3 mL        Mode: AC/ CMV (Assist Control/ Continuous Mandatory Ventilation)  RR (machine): 25  TV (machine): 430  FiO2: 60  PEEP: 3  ITime: 1  MAP: 14  PIP: 45        PHYSICAL EXAM:    GENERAL: NAD  EYES: EOMI, PERRLA  NECK: Supple, No JVD; Normal thyroid; Trachea midline: No LAD   NERVOUS SYSTEM:  Alert & Oriented X3,  Motor Strength 5/5 B/L upper and lower extremities; DTRs 2+ intact and symmetric  CHEST/LUNG: No rales, rhonchi, wheezing, breath sounds present bilaterally  HEART: Regular rate and rhythm; No murmurs, no gallops  ABDOMEN: Soft, Nontender, Nondistended; Bowel sounds present, no pain or masses on palpation  : voiding well, Rivera in place  EXTREMITIES:  2+ Peripheral Pulses, No clubbing, cyanosis, or edema  SKIN: warm, intact, no lesions         LABS:  CBC Full  -  ( 29 Apr 2021 15:44 )  WBC Count : 8.25 K/uL  RBC Count : 2.71 M/uL  Hemoglobin : 8.7 g/dL  Hematocrit : 26.0 %  Platelet Count - Automated : 313 K/uL  Mean Cell Volume : 95.9 fl  Mean Cell Hemoglobin : 32.1 pg  Mean Cell Hemoglobin Concentration : 33.5 gm/dL  Auto Neutrophil # : x  Auto Lymphocyte # : x  Auto Monocyte # : x  Auto Eosinophil # : x  Auto Basophil # : x  Auto Neutrophil % : x  Auto Lymphocyte % : x  Auto Monocyte % : x  Auto Eosinophil % : x  Auto Basophil % : x    04-29    141  |  106  |  8   ----------------------------<  172<H>  5.0   |  36<H>  |  0.25<L>    Ca    7.9<L>      29 Apr 2021 15:44  Phos  2.9     04-30  Mg     2.0     04-30    TPro  4.9<L>  /  Alb  1.5<L>  /  TBili  0.3  /  DBili  x   /  AST  22  /  ALT  16  /  AlkPhos  76  04-29    PT/INR - ( 28 Apr 2021 14:36 )   PT: 14.9 sec;   INR: 1.27 ratio                 RADIOLOGY & ADDITIONAL STUDIES REVIEWED:  ***    [ ]GOALS OF CARE DISCUSSION WITH PATIENT/FAMILY/PROXY:    CRITICAL CARE TIME SPENT: 35 minutes   INTERVAL HPI/OVERNIGHT EVENTS: patient was tachycardic and tachypneic overnight when trying to titrate down sedation. Patient's hemoglobin improved, no transfusions needed overnight, no signs of bleeding. Patient did not have a BM in 2 days, will resume bowel regimen.      PRESSORS: [ ] YES [x ] NO    ANTIBIOTICS:                  DATE STARTED:    Antimicrobial:    Cardiovascular:    Pulmonary:  ALBUTerol    90 MICROgram(s) HFA Inhaler 2 Puff(s) Inhalation every 6 hours PRN    Hematalogic:    Other:  artificial  tears Solution 1 Drop(s) Both EYES every 4 hours PRN  ascorbic acid 1000 milliGRAM(s) Oral daily  chlorhexidine 0.12% Liquid 15 milliLiter(s) Oral Mucosa every 12 hours  chlorhexidine 2% Cloths 1 Application(s) Topical <User Schedule>  dexMEDEtomidine Infusion 0.2 MICROgram(s)/kG/Hr IV Continuous <Continuous>  fentaNYL   Infusion. 0.5 MICROgram(s)/kG/Hr IV Continuous <Continuous>  fentaNYL   Patch  50 MICROgram(s)/Hr. 1 Patch Transdermal every 48 hours  insulin lispro (ADMELOG) corrective regimen sliding scale   SubCutaneous every 6 hours  LORazepam   Injectable 4 milliGRAM(s) IV Push every 6 hours  OLANZapine 5 milliGRAM(s) Oral two times a day  pantoprazole  Injectable 40 milliGRAM(s) IV Push every 12 hours  predniSONE   Tablet 40 milliGRAM(s) Oral daily  sodium chloride 0.9% lock flush 10 milliLiter(s) IV Push every 1 hour PRN    ALBUTerol    90 MICROgram(s) HFA Inhaler 2 Puff(s) Inhalation every 6 hours PRN  artificial  tears Solution 1 Drop(s) Both EYES every 4 hours PRN  ascorbic acid 1000 milliGRAM(s) Oral daily  chlorhexidine 0.12% Liquid 15 milliLiter(s) Oral Mucosa every 12 hours  chlorhexidine 2% Cloths 1 Application(s) Topical <User Schedule>  dexMEDEtomidine Infusion 0.2 MICROgram(s)/kG/Hr IV Continuous <Continuous>  fentaNYL   Infusion. 0.5 MICROgram(s)/kG/Hr IV Continuous <Continuous>  fentaNYL   Patch  50 MICROgram(s)/Hr. 1 Patch Transdermal every 48 hours  insulin lispro (ADMELOG) corrective regimen sliding scale   SubCutaneous every 6 hours  LORazepam   Injectable 4 milliGRAM(s) IV Push every 6 hours  OLANZapine 5 milliGRAM(s) Oral two times a day  pantoprazole  Injectable 40 milliGRAM(s) IV Push every 12 hours  predniSONE   Tablet 40 milliGRAM(s) Oral daily  sodium chloride 0.9% lock flush 10 milliLiter(s) IV Push every 1 hour PRN    Drug Dosing Weight  Height (cm): 167.6 (23 Apr 2021 23:15)  Weight (kg): 83.9 (23 Apr 2021 23:15)  BMI (kg/m2): 29.9 (23 Apr 2021 23:15)  BSA (m2): 1.93 (23 Apr 2021 23:15)    CENTRAL LINE: [ ] YES [ ] NO  LOCATION:         RIVERA: [ ] YES [ ] NO          A-LINE:  [ ] YES [ ] NO  LOCATION:             ICU Vital Signs Last 24 Hrs  T(C): 37.5 (30 Apr 2021 06:00), Max: 37.7 (29 Apr 2021 19:00)  T(F): 99.5 (30 Apr 2021 06:00), Max: 99.9 (29 Apr 2021 19:00)  HR: 77 (30 Apr 2021 06:00) (74 - 129)  BP: 102/38 (30 Apr 2021 06:00) (96/46 - 165/71)  BP(mean): 53 (30 Apr 2021 06:00) (53 - 95)  ABP: --  ABP(mean): --  RR: 38 (30 Apr 2021 06:00) (23 - 41)  SpO2: 92% (30 Apr 2021 06:00) (84% - 99%)      ABG - ( 30 Apr 2021 04:32 )  pH, Arterial: 7.44  pH, Blood: x     /  pCO2: 60    /  pO2: 81    / HCO3: 41    / Base Excess: 13.8  /  SaO2: 99                    04-29 @ 07:01  -  04-30 @ 07:00  --------------------------------------------------------  IN: 1955.3 mL / OUT: 1410 mL / NET: 545.3 mL        Mode: AC/ CMV (Assist Control/ Continuous Mandatory Ventilation)  RR (machine): 25  TV (machine): 430  FiO2: 60  PEEP: 3  ITime: 1  MAP: 14  PIP: 45        PHYSICAL EXAM:    GENERAL: NAD, trach to vent sat above 97%  EYES: EOMI, PERRLA  NECK: Supple, No JVD; Normal thyroid; Trachea midline: No LAD, trach    NERVOUS SYSTEM: sedated, able to follow commands    CHEST/LUNG: No rales, rhonchi, wheezing, breath sounds present bilaterally  HEART: Regular rate and rhythm; No murmurs, no gallops  ABDOMEN: Soft, Nontender, Nondistended; Bowel sounds present, no pain or masses on palpation, G tube in place- functional, no signs of infection   : condom cath in place  EXTREMITIES:  2+ Peripheral Pulses, No clubbing or cyanosis. Bilateral upper extremity edema with ecchymosis   SKIN: warm, intact, no lesions         LABS:  CBC Full  -  ( 29 Apr 2021 15:44 )  WBC Count : 8.25 K/uL  RBC Count : 2.71 M/uL  Hemoglobin : 8.7 g/dL  Hematocrit : 26.0 %  Platelet Count - Automated : 313 K/uL  Mean Cell Volume : 95.9 fl  Mean Cell Hemoglobin : 32.1 pg  Mean Cell Hemoglobin Concentration : 33.5 gm/dL  Auto Neutrophil # : x  Auto Lymphocyte # : x  Auto Monocyte # : x  Auto Eosinophil # : x  Auto Basophil # : x  Auto Neutrophil % : x  Auto Lymphocyte % : x  Auto Monocyte % : x  Auto Eosinophil % : x  Auto Basophil % : x    04-29    141  |  106  |  8   ----------------------------<  172<H>  5.0   |  36<H>  |  0.25<L>    Ca    7.9<L>      29 Apr 2021 15:44  Phos  2.9     04-30  Mg     2.0     04-30    TPro  4.9<L>  /  Alb  1.5<L>  /  TBili  0.3  /  DBili  x   /  AST  22  /  ALT  16  /  AlkPhos  76  04-29    PT/INR - ( 28 Apr 2021 14:36 )   PT: 14.9 sec;   INR: 1.27 ratio                 RADIOLOGY & ADDITIONAL STUDIES REVIEWED:  ***    [ ]GOALS OF CARE DISCUSSION WITH PATIENT/FAMILY/PROXY:    CRITICAL CARE TIME SPENT: 35 minutes

## 2021-04-30 NOTE — PROGRESS NOTE ADULT - SUBJECTIVE AND OBJECTIVE BOX
Patient is a 55y old  Male who presents with a chief complaint of SOB (30 Apr 2021 09:39  Patient is sedated, laying in bed on ventilator via trach but in NAD    INTERVAL HPI/OVERNIGHT EVENTS:      VITAL SIGNS:  T(F): 99.5 (04-30-21 @ 06:00)  HR: 79 (04-30-21 @ 11:00)  BP: 110/48 (04-30-21 @ 11:00)  RR: 25 (04-30-21 @ 11:00)  SpO2: 99% (04-30-21 @ 11:00)  Wt(kg): --  I&O's Detail    29 Apr 2021 07:01  -  30 Apr 2021 07:00  --------------------------------------------------------  IN:    Dexmedetomidine: 579.9 mL    Enteral Tube Flush: 110 mL    FentaNYL: 305.4 mL    Glucerna 1.5: 960 mL  Total IN: 1955.3 mL    OUT:    Chest Tube (mL): 160 mL    Incontinent per Condom Catheter (mL): 1250 mL  Total OUT: 1410 mL    Total NET: 545.3 mL      30 Apr 2021 07:01  -  30 Apr 2021 12:09  --------------------------------------------------------  IN:    Dexmedetomidine: 124 mL    FentaNYL: 33.6 mL  Total IN: 157.6 mL    OUT:  Total OUT: 0 mL    Total NET: 157.6 mL        Mode: AC/ CMV (Assist Control/ Continuous Mandatory Ventilation)  RR (machine): 25  TV (machine): 430  FiO2: 60  PEEP: 3  ITime: 0.7  MAP: 13  PIP: 43        REVIEW OF SYSTEMS:    CONSTITUTIONAL:  No fevers, chills, sweats    HEENT:  Eyes:  No diplopia or blurred vision. ENT:  No earache, sore throat or runny nose.    CARDIOVASCULAR:  No pressure, squeezing, tightness, or heaviness about the chest; no palpitations.    RESPIRATORY:  Per HPI    GASTROINTESTINAL:  No abdominal pain, nausea, vomiting or diarrhea.    GENITOURINARY:  No dysuria, frequency or urgency.    NEUROLOGIC:  No paresthesias, fasciculations, seizures or weakness.    PSYCHIATRIC:  No disorder of thought or mood.      PHYSICAL EXAM:    Constitutional: Well developed and nourished  Eyes:Perrla  ENMT: normal  Neck:supple  Respiratory: good air entry  Cardiovascular: S1 S2 regular  Gastrointestinal: Soft, Non tender  Extremities: No edema  Vascular:normal  Neurological:Awake, alert,Ox3  Musculoskeletal:Normal      MEDICATIONS  (STANDING):  ascorbic acid 1000 milliGRAM(s) Oral daily  chlorhexidine 0.12% Liquid 15 milliLiter(s) Oral Mucosa every 12 hours  chlorhexidine 2% Cloths 1 Application(s) Topical <User Schedule>  dexMEDEtomidine Infusion 0.2 MICROgram(s)/kG/Hr (4.2 mL/Hr) IV Continuous <Continuous>  fentaNYL   Patch  75 MICROgram(s)/Hr. 1 Patch Transdermal every 48 hours  insulin lispro (ADMELOG) corrective regimen sliding scale   SubCutaneous every 6 hours  LORazepam   Injectable 4 milliGRAM(s) IV Push every 6 hours  OLANZapine 5 milliGRAM(s) Oral two times a day  pantoprazole  Injectable 40 milliGRAM(s) IV Push every 12 hours  polyethylene glycol 3350 17 Gram(s) Oral daily  predniSONE   Tablet 40 milliGRAM(s) Oral daily    MEDICATIONS  (PRN):  ALBUTerol    90 MICROgram(s) HFA Inhaler 2 Puff(s) Inhalation every 6 hours PRN Shortness of Breath and/or Wheezing  artificial  tears Solution 1 Drop(s) Both EYES every 4 hours PRN Dry Eyes  sodium chloride 0.9% lock flush 10 milliLiter(s) IV Push every 1 hour PRN Pre/post blood products, medications, blood draw, and to maintain line patency      Allergies    No Known Allergies    Intolerances        LABS:                        8.3    9.74  )-----------( 305      ( 30 Apr 2021 06:15 )             26.4     04-30    144  |  107  |  13  ----------------------------<  144<H>  3.7   |  37<H>  |  0.27<L>    Ca    7.8<L>      30 Apr 2021 06:15  Phos  2.9     04-30  Mg     2.0     04-30    TPro  5.5<L>  /  Alb  1.6<L>  /  TBili  0.5  /  DBili  x   /  AST  17  /  ALT  18  /  AlkPhos  78  04-30    PT/INR - ( 28 Apr 2021 14:36 )   PT: 14.9 sec;   INR: 1.27 ratio             ABG - ( 30 Apr 2021 04:32 )  pH, Arterial: 7.44  pH, Blood: x     /  pCO2: 60    /  pO2: 81    / HCO3: 41    / Base Excess: 13.8  /  SaO2: 99                    CAPILLARY BLOOD GLUCOSE      POCT Blood Glucose.: 199 mg/dL (30 Apr 2021 12:07)  POCT Blood Glucose.: 153 mg/dL (30 Apr 2021 06:21)  POCT Blood Glucose.: 173 mg/dL (29 Apr 2021 17:31)    pro-bnp -- 04-26 @ 05:42     d-dimer 1434  04-26 @ 05:42      RADIOLOGY & ADDITIONAL TESTS:    CXR:  < from: Xray Chest 1 View- PORTABLE-Routine (Xray Chest 1 View- PORTABLE-Routine in AM.) (04.30.21 @ 07:45) >  FINDINGS/  IMPRESSION:  Tracheostomy tube again noted. Left chest tube noted with small left apical pneumothorax unchanged.    Bilateral airspace opacities overall worsening.    Heart size cannot be accurately assessed in this projection.    < end of copied text >    Ct scan chest:    ekg;    echo:

## 2021-04-30 NOTE — PROGRESS NOTE ADULT - SUBJECTIVE AND OBJECTIVE BOX
Patient is a 55y old  Male who presents with a chief complaint of SOB (29 Apr 2021 10:12)    pt seen in icu [ x ], reg med floor [   ], bed [ x ], chair at bedside [   ], awake and responsive [  ], mildly sedated, open eyes spontaneously [ x ],  nad [x  ]    andrea [ x ], g-tube feeding [x  ], tracheostomy tube [ x ], cent line [x  ],        Allergies    No Known Allergies        Vitals    T(F): 99.5 (04-30-21 @ 06:00), Max: 99.9 (04-29-21 @ 19:00)  HR: 77 (04-30-21 @ 06:00) (74 - 129)  BP: 102/38 (04-30-21 @ 06:00) (96/46 - 165/71)  RR: 38 (04-30-21 @ 06:00) (23 - 43)  SpO2: 92% (04-30-21 @ 06:00) (84% - 99%)  Wt(kg): --  CAPILLARY BLOOD GLUCOSE      POCT Blood Glucose.: 153 mg/dL (30 Apr 2021 06:21)      Labs                          8.7    8.25  )-----------( 313      ( 29 Apr 2021 15:44 )             26.0       04-29    141  |  106  |  8   ----------------------------<  172<H>  5.0   |  36<H>  |  0.25<L>    Ca    7.9<L>      29 Apr 2021 15:44  Phos  2.6     04-29  Mg     1.8     04-29    TPro  4.9<L>  /  Alb  1.5<L>  /  TBili  0.3  /  DBili  x   /  AST  22  /  ALT  16  /  AlkPhos  76  04-29            .Sputum Sputum  04-16 @ 04:42   Moderate Enterobacter aerogenes (Carbapenem Resistant)  Normal Respiratory Monserrat present  --  Enterobacter aerogenes (Carbapenem Resistant)      .Urine Clean Catch (Midstream)  03-15 @ 00:52   No growth  --  --          Radiology Results      Meds    MEDICATIONS  (STANDING):  ascorbic acid 1000 milliGRAM(s) Oral daily  chlorhexidine 0.12% Liquid 15 milliLiter(s) Oral Mucosa every 12 hours  chlorhexidine 2% Cloths 1 Application(s) Topical <User Schedule>  dexMEDEtomidine Infusion 0.2 MICROgram(s)/kG/Hr (4.2 mL/Hr) IV Continuous <Continuous>  fentaNYL   Infusion. 0.5 MICROgram(s)/kG/Hr (4.2 mL/Hr) IV Continuous <Continuous>  fentaNYL   Patch  50 MICROgram(s)/Hr. 1 Patch Transdermal every 48 hours  insulin lispro (ADMELOG) corrective regimen sliding scale   SubCutaneous every 6 hours  LORazepam   Injectable 4 milliGRAM(s) IV Push every 6 hours  OLANZapine 5 milliGRAM(s) Oral two times a day  pantoprazole  Injectable 40 milliGRAM(s) IV Push every 12 hours  predniSONE   Tablet 40 milliGRAM(s) Oral daily      MEDICATIONS  (PRN):  ALBUTerol    90 MICROgram(s) HFA Inhaler 2 Puff(s) Inhalation every 6 hours PRN Shortness of Breath and/or Wheezing  artificial  tears Solution 1 Drop(s) Both EYES every 4 hours PRN Dry Eyes  sodium chloride 0.9% lock flush 10 milliLiter(s) IV Push every 1 hour PRN Pre/post blood products, medications, blood draw, and to maintain line patency      Physical Exam    Neuro :  no focal deficits  Respiratory: CTA B/L  CV: RRR, S1S2, no murmurs,   Abdominal: Soft, NT, ND +BS, g- tube in place, dressing cdi  Extremities: b/l ue edema, + peripheral pulses      ASSESSMENT    Hypoxemia 2nd to covid pna   transaminitis  prediabetes  h/o appendectomy  cholecystectomy        PLAN    contact and airborne isolation  d/c remdesevir given covid ab positive noted   completed dexamethasone   started pulse steroids for 3 days - 250mg solumedrol bid now tapered off 4/14/21  cont asa, vit c,    cont albuterol inhaler   pulm f/u  procalcitonin, D-dimer, crp, ldh, ferritin, lactate noted ,    tmx 99.7  cont tylenol prn,   cont robitussin prn   pt on fentanyl, precedex drip   off pressors  s/p intubation 3/29/21   s/p tracheostomy 4/21/21  O2 sat  (93% - 100%) mech vent 60%  O2 via mech vent and taper fio2 as tolerated   vent mgmt as per icu  xray 3/19/21 with pneumomediastinum  rept cxr with New trace right apical pneumothorax. New mild left apical pneumothorax. Grossly stable small pneumomediastinum.  Soft tissue emphysema at the neck bases bilaterally. Grossly stable bilateral pulmonary infiltrates noted.   cxr 2/24 with No evidence of pneumothorax can be appreciated on the available image. This may be related to patient positioning. Evidence of pneumomediastinum and subcutaneous emphysema in the lower neck is again noted. There are patchy bibasilar infiltrates and elevated right hemidiaphragm noted.   cxr 3/29/21 with No significant change bilateral infiltrates. There is a small simple left apical pneumothorax. No significant pleural effusion. Bilateral subcutaneous emphysema similar to prior.   XRs on 7AM and 5PM with no obvious increase of ptx  cxr 4/4/21 with Improving bilateral airspace disease noted    cxr 4/8/21 with Small left pneumothorax noted.  thoracic surg f/u   s/p L chest tube replacement 4/18/21 for recurrence of left pneumothorax   cxr 4/20/21 with Unchanged advanced infiltrates and catheter left chest tube noted   CXR 4/11/21 with : No interval change compared to one day prior. No pneumothorax noted.   Daily CXR  Monitor O2 status   s/p tracheostomy 4/21/21   stay sutures out 2 weeks from OR date  cxr 4/21/21 with No evidence of active chest disease. Tracheostomy tube in place otherwise no significant change noted   cxr 4/25/21 with A 40% LEFT pneumothorax. LEFT multi-sidehole pigtail catheter overlies LEFT lower hemithorax.. Bilateral multifocal and diffuse ill-defined airspace opacities..  Follow-up AP portable chest radiograph 4/25/2021 AT 8:58 AM: Residual LEFT 30% upper zone pneumothorax. Otherwise no interval change.noted above..  s/p surgical placement of gastrostomy tube 4/23/2021.   cont on tube feeding  id f/u   No need for further antibiotics as patient completed course of Meropenem  leukocytosis resolved  speutum cx with Enterobacter aerogenes (Carbapenem Resistant) noted above  andera   rectal tube  lispro ss   prognosis poor   s/p 4 units prbc for anemia  f/u h/h   supplement potassium for hypokalemia  cont current meds  mgmt as per icu        Patient is a 55y old  Male who presents with a chief complaint of SOB (29 Apr 2021 10:12)    pt seen in icu [ x ], reg med floor [   ], bed [ x ], chair at bedside [   ], awake and responsive [  ], mildly sedated, [ x ],  nad [x  ]    andrea [ x ], g-tube feeding [x  ], tracheostomy tube [ x ], cent line [x  ],        Allergies    No Known Allergies        Vitals    T(F): 99.5 (04-30-21 @ 06:00), Max: 99.9 (04-29-21 @ 19:00)  HR: 77 (04-30-21 @ 06:00) (74 - 129)  BP: 102/38 (04-30-21 @ 06:00) (96/46 - 165/71)  RR: 38 (04-30-21 @ 06:00) (23 - 43)  SpO2: 92% (04-30-21 @ 06:00) (84% - 99%)  Wt(kg): --  CAPILLARY BLOOD GLUCOSE      POCT Blood Glucose.: 153 mg/dL (30 Apr 2021 06:21)      Labs                          8.7    8.25  )-----------( 313      ( 29 Apr 2021 15:44 )             26.0       04-29    141  |  106  |  8   ----------------------------<  172<H>  5.0   |  36<H>  |  0.25<L>    Ca    7.9<L>      29 Apr 2021 15:44  Phos  2.6     04-29  Mg     1.8     04-29    TPro  4.9<L>  /  Alb  1.5<L>  /  TBili  0.3  /  DBili  x   /  AST  22  /  ALT  16  /  AlkPhos  76  04-29            .Sputum Sputum  04-16 @ 04:42   Moderate Enterobacter aerogenes (Carbapenem Resistant)  Normal Respiratory Monserrat present  --  Enterobacter aerogenes (Carbapenem Resistant)      .Urine Clean Catch (Midstream)  03-15 @ 00:52   No growth  --  --          Radiology Results      Meds    MEDICATIONS  (STANDING):  ascorbic acid 1000 milliGRAM(s) Oral daily  chlorhexidine 0.12% Liquid 15 milliLiter(s) Oral Mucosa every 12 hours  chlorhexidine 2% Cloths 1 Application(s) Topical <User Schedule>  dexMEDEtomidine Infusion 0.2 MICROgram(s)/kG/Hr (4.2 mL/Hr) IV Continuous <Continuous>  fentaNYL   Infusion. 0.5 MICROgram(s)/kG/Hr (4.2 mL/Hr) IV Continuous <Continuous>  fentaNYL   Patch  50 MICROgram(s)/Hr. 1 Patch Transdermal every 48 hours  insulin lispro (ADMELOG) corrective regimen sliding scale   SubCutaneous every 6 hours  LORazepam   Injectable 4 milliGRAM(s) IV Push every 6 hours  OLANZapine 5 milliGRAM(s) Oral two times a day  pantoprazole  Injectable 40 milliGRAM(s) IV Push every 12 hours  predniSONE   Tablet 40 milliGRAM(s) Oral daily      MEDICATIONS  (PRN):  ALBUTerol    90 MICROgram(s) HFA Inhaler 2 Puff(s) Inhalation every 6 hours PRN Shortness of Breath and/or Wheezing  artificial  tears Solution 1 Drop(s) Both EYES every 4 hours PRN Dry Eyes  sodium chloride 0.9% lock flush 10 milliLiter(s) IV Push every 1 hour PRN Pre/post blood products, medications, blood draw, and to maintain line patency      Physical Exam    Neuro :  no focal deficits  Respiratory: CTA B/L  CV: RRR, S1S2, no murmurs,   Abdominal: Soft, NT, ND +BS, g- tube in place, dressing cdi  Extremities: b/l ue edema, + peripheral pulses      ASSESSMENT    Hypoxemia 2nd to covid pna   transaminitis  prediabetes  h/o appendectomy  cholecystectomy        PLAN    contact and airborne isolation  d/c remdesevir given covid ab positive noted   completed dexamethasone   started pulse steroids for 3 days - 250mg solumedrol bid now tapered off 4/14/21  cont asa, vit c,    cont albuterol inhaler   pulm f/u  procalcitonin, D-dimer, crp, ldh, ferritin, lactate noted ,    tmx 99.9  cont tylenol prn,   cont robitussin prn   pt on fentanyl, precedex drip   off pressors  s/p intubation 3/29/21   s/p tracheostomy 4/21/21  O2 sat  (84% - 99%) mech vent 60%  O2 via mech vent and taper fio2 as tolerated   vent mgmt as per icu  xray 3/19/21 with pneumomediastinum  rept cxr with New trace right apical pneumothorax. New mild left apical pneumothorax. Grossly stable small pneumomediastinum.  Soft tissue emphysema at the neck bases bilaterally. Grossly stable bilateral pulmonary infiltrates noted.   cxr 2/24 with No evidence of pneumothorax can be appreciated on the available image. This may be related to patient positioning. Evidence of pneumomediastinum and subcutaneous emphysema in the lower neck is again noted. There are patchy bibasilar infiltrates and elevated right hemidiaphragm noted.   cxr 3/29/21 with No significant change bilateral infiltrates. There is a small simple left apical pneumothorax. No significant pleural effusion. Bilateral subcutaneous emphysema similar to prior.   XRs on 7AM and 5PM with no obvious increase of ptx  cxr 4/4/21 with Improving bilateral airspace disease noted    cxr 4/8/21 with Small left pneumothorax noted.  thoracic surg f/u   s/p L chest tube replacement 4/18/21 for recurrence of left pneumothorax   cxr 4/20/21 with Unchanged advanced infiltrates and catheter left chest tube noted   CXR 4/11/21 with : No interval change compared to one day prior. No pneumothorax noted.   Daily CXR  Monitor O2 status   s/p tracheostomy 4/21/21   stay sutures out 2 weeks from OR date  cxr 4/21/21 with No evidence of active chest disease. Tracheostomy tube in place otherwise no significant change noted   cxr 4/25/21 with A 40% LEFT pneumothorax. LEFT multi-sidehole pigtail catheter overlies LEFT lower hemithorax.. Bilateral multifocal and diffuse ill-defined airspace opacities..  Follow-up AP portable chest radiograph 4/25/2021 AT 8:58 AM: Residual LEFT 30% upper zone pneumothorax. Otherwise no interval change.noted above..  s/p surgical placement of gastrostomy tube 4/23/2021.   cont on tube feeding  id f/u   No need for further antibiotics as patient completed course of Meropenem  leukocytosis resolved  speutum cx with Enterobacter aerogenes (Carbapenem Resistant) noted above  andrea   rectal tube  lispro ss   prognosis poor   s/p 4 units prbc for anemia  f/u h/h   supplement potassium for hypokalemia  cont current meds  mgmt as per icu        Patient is a 55y old  Male who presents with a chief complaint of SOB (29 Apr 2021 10:12)    pt seen in icu [ x ], reg med floor [   ], bed [ x ], chair at bedside [   ], awake and responsive [  ], mildly sedated, [ x ],  nad [x  ]    andrea [ x ], g-tube feeding [x  ], tracheostomy tube [ x ], cent line [x  ],        Allergies    No Known Allergies        Vitals    T(F): 99.5 (04-30-21 @ 06:00), Max: 99.9 (04-29-21 @ 19:00)  HR: 77 (04-30-21 @ 06:00) (74 - 129)  BP: 102/38 (04-30-21 @ 06:00) (96/46 - 165/71)  RR: 38 (04-30-21 @ 06:00) (23 - 43)  SpO2: 92% (04-30-21 @ 06:00) (84% - 99%)  Wt(kg): --  CAPILLARY BLOOD GLUCOSE      POCT Blood Glucose.: 153 mg/dL (30 Apr 2021 06:21)      Labs                          8.7    8.25  )-----------( 313      ( 29 Apr 2021 15:44 )             26.0       04-29    141  |  106  |  8   ----------------------------<  172<H>  5.0   |  36<H>  |  0.25<L>    Ca    7.9<L>      29 Apr 2021 15:44  Phos  2.6     04-29  Mg     1.8     04-29    TPro  4.9<L>  /  Alb  1.5<L>  /  TBili  0.3  /  DBili  x   /  AST  22  /  ALT  16  /  AlkPhos  76  04-29            .Sputum Sputum  04-16 @ 04:42   Moderate Enterobacter aerogenes (Carbapenem Resistant)  Normal Respiratory Monserrat present  --  Enterobacter aerogenes (Carbapenem Resistant)      .Urine Clean Catch (Midstream)  03-15 @ 00:52   No growth  --  --          Radiology Results      Meds    MEDICATIONS  (STANDING):  ascorbic acid 1000 milliGRAM(s) Oral daily  chlorhexidine 0.12% Liquid 15 milliLiter(s) Oral Mucosa every 12 hours  chlorhexidine 2% Cloths 1 Application(s) Topical <User Schedule>  dexMEDEtomidine Infusion 0.2 MICROgram(s)/kG/Hr (4.2 mL/Hr) IV Continuous <Continuous>  fentaNYL   Infusion. 0.5 MICROgram(s)/kG/Hr (4.2 mL/Hr) IV Continuous <Continuous>  fentaNYL   Patch  50 MICROgram(s)/Hr. 1 Patch Transdermal every 48 hours  insulin lispro (ADMELOG) corrective regimen sliding scale   SubCutaneous every 6 hours  LORazepam   Injectable 4 milliGRAM(s) IV Push every 6 hours  OLANZapine 5 milliGRAM(s) Oral two times a day  pantoprazole  Injectable 40 milliGRAM(s) IV Push every 12 hours  predniSONE   Tablet 40 milliGRAM(s) Oral daily      MEDICATIONS  (PRN):  ALBUTerol    90 MICROgram(s) HFA Inhaler 2 Puff(s) Inhalation every 6 hours PRN Shortness of Breath and/or Wheezing  artificial  tears Solution 1 Drop(s) Both EYES every 4 hours PRN Dry Eyes  sodium chloride 0.9% lock flush 10 milliLiter(s) IV Push every 1 hour PRN Pre/post blood products, medications, blood draw, and to maintain line patency      Physical Exam    Neuro :  no focal deficits  Respiratory: CTA B/L  CV: RRR, S1S2, no murmurs,   Abdominal: Soft, NT, ND +BS, g- tube in place, dressing cdi  Extremities: b/l ue edema, + peripheral pulses      ASSESSMENT    Hypoxemia 2nd to covid pna   transaminitis  prediabetes  h/o appendectomy  cholecystectomy        PLAN    contact and airborne isolation  d/c remdesevir given covid ab positive noted   completed dexamethasone   started pulse steroids for 3 days - 250mg solumedrol bid now tapered off 4/14/21  cont asa, vit c,    cont albuterol inhaler   pulm f/u  procalcitonin, D-dimer, crp, ldh, ferritin, lactate noted ,    tmx 99.9  cont tylenol prn,   cont robitussin prn   pt on fentanyl, precedex drip   off pressors  s/p intubation 3/29/21   s/p tracheostomy 4/21/21  O2 sat  (84% - 99%) mech vent 60%  O2 via mech vent and taper fio2 as tolerated   vent mgmt as per icu  xray 3/19/21 with pneumomediastinum  rept cxr with New trace right apical pneumothorax. New mild left apical pneumothorax. Grossly stable small pneumomediastinum.  Soft tissue emphysema at the neck bases bilaterally. Grossly stable bilateral pulmonary infiltrates noted.   cxr 2/24 with No evidence of pneumothorax can be appreciated on the available image. This may be related to patient positioning. Evidence of pneumomediastinum and subcutaneous emphysema in the lower neck is again noted. There are patchy bibasilar infiltrates and elevated right hemidiaphragm noted.   cxr 3/29/21 with No significant change bilateral infiltrates. There is a small simple left apical pneumothorax. No significant pleural effusion. Bilateral subcutaneous emphysema similar to prior.   XRs on 7AM and 5PM with no obvious increase of ptx  cxr 4/4/21 with Improving bilateral airspace disease noted    cxr 4/8/21 with Small left pneumothorax noted.  thoracic surg f/u   s/p L chest tube replacement 4/18/21 for recurrence of left pneumothorax   cxr 4/20/21 with Unchanged advanced infiltrates and catheter left chest tube noted   CXR 4/11/21 with : No interval change compared to one day prior. No pneumothorax noted.   unable to flush or aspirate tube fully, noted debris in tubing which was milked out.   Once material removed from tubing able to aspirated and flush fully. Also changed dressing on pigtail which appears to be in good position.   Pigtail not kinked or clamped.   Discussed with ICU, recommend repeating CXR to assess if PTX improved after flushing pigtail.   Flush pigtail PRN.  Daily CXR  Monitor O2 status   s/p tracheostomy 4/21/21   stay sutures out 2 weeks from OR date  cxr 4/21/21 with No evidence of active chest disease. Tracheostomy tube in place otherwise no significant change noted   cxr 4/25/21 with A 40% LEFT pneumothorax. LEFT multi-sidehole pigtail catheter overlies LEFT lower hemithorax.. Bilateral multifocal and diffuse ill-defined airspace opacities..  Follow-up AP portable chest radiograph 4/25/2021 AT 8:58 AM: Residual LEFT 30% upper zone pneumothorax. Otherwise no interval change.noted above..  s/p surgical placement of gastrostomy tube 4/23/2021.   cont on tube feeding  id f/u   No need for further antibiotics as patient completed course of Meropenem  leukocytosis resolved  speutum cx with Enterobacter aerogenes (Carbapenem Resistant) noted above  andrea   rectal tube  lispro ss   prognosis poor   s/p 4 units prbc for anemia  f/u h/h   supplement potassium for hypokalemia  cont current meds  mgmt as per icu

## 2021-04-30 NOTE — CHART NOTE - NSCHARTNOTEFT_GEN_A_CORE
Spoke to brother Brennan at [phone] to give daily update. He was informed about current medical status. Questions answered.

## 2021-05-01 DIAGNOSIS — D64.9 ANEMIA, UNSPECIFIED: ICD-10-CM

## 2021-05-01 LAB
ALBUMIN SERPL ELPH-MCNC: 1.6 G/DL — LOW (ref 3.5–5)
ALP SERPL-CCNC: 91 U/L — SIGNIFICANT CHANGE UP (ref 40–120)
ALT FLD-CCNC: 21 U/L DA — SIGNIFICANT CHANGE UP (ref 10–60)
ANION GAP SERPL CALC-SCNC: 0 MMOL/L — LOW (ref 5–17)
AST SERPL-CCNC: 22 U/L — SIGNIFICANT CHANGE UP (ref 10–40)
BASE EXCESS BLDA CALC-SCNC: 15.2 MMOL/L — HIGH (ref -2–3)
BILIRUB SERPL-MCNC: 0.3 MG/DL — SIGNIFICANT CHANGE UP (ref 0.2–1.2)
BLOOD GAS COMMENTS ARTERIAL: SIGNIFICANT CHANGE UP
BUN SERPL-MCNC: 15 MG/DL — SIGNIFICANT CHANGE UP (ref 7–18)
CALCIUM SERPL-MCNC: 7.8 MG/DL — LOW (ref 8.4–10.5)
CHLORIDE SERPL-SCNC: 106 MMOL/L — SIGNIFICANT CHANGE UP (ref 96–108)
CO2 SERPL-SCNC: 39 MMOL/L — HIGH (ref 22–31)
CREAT SERPL-MCNC: 0.25 MG/DL — LOW (ref 0.5–1.3)
GLUCOSE BLDC GLUCOMTR-MCNC: 103 MG/DL — HIGH (ref 70–99)
GLUCOSE BLDC GLUCOMTR-MCNC: 105 MG/DL — HIGH (ref 70–99)
GLUCOSE BLDC GLUCOMTR-MCNC: 141 MG/DL — HIGH (ref 70–99)
GLUCOSE BLDC GLUCOMTR-MCNC: 156 MG/DL — HIGH (ref 70–99)
GLUCOSE SERPL-MCNC: 116 MG/DL — HIGH (ref 70–99)
HCO3 BLDA-SCNC: 42 MMOL/L — HIGH (ref 21–28)
HCT VFR BLD CALC: 27.1 % — LOW (ref 39–50)
HGB BLD-MCNC: 8.2 G/DL — LOW (ref 13–17)
HOROWITZ INDEX BLDA+IHG-RTO: 60 — SIGNIFICANT CHANGE UP
MAGNESIUM SERPL-MCNC: 1.9 MG/DL — SIGNIFICANT CHANGE UP (ref 1.6–2.6)
MCHC RBC-ENTMCNC: 30.3 GM/DL — LOW (ref 32–36)
MCHC RBC-ENTMCNC: 31.2 PG — SIGNIFICANT CHANGE UP (ref 27–34)
MCV RBC AUTO: 103 FL — HIGH (ref 80–100)
METHYLMALONATE SERPL-SCNC: 53 NMOL/L — SIGNIFICANT CHANGE UP (ref 0–378)
NRBC # BLD: 0 /100 WBCS — SIGNIFICANT CHANGE UP (ref 0–0)
PCO2 BLDA: 59 MMHG — HIGH (ref 35–48)
PH BLDA: 7.46 — HIGH (ref 7.35–7.45)
PHOSPHATE SERPL-MCNC: 3 MG/DL — SIGNIFICANT CHANGE UP (ref 2.5–4.5)
PLATELET # BLD AUTO: 319 K/UL — SIGNIFICANT CHANGE UP (ref 150–400)
PO2 BLDA: 89 MMHG — SIGNIFICANT CHANGE UP (ref 83–108)
POTASSIUM SERPL-MCNC: 3.9 MMOL/L — SIGNIFICANT CHANGE UP (ref 3.5–5.3)
POTASSIUM SERPL-SCNC: 3.9 MMOL/L — SIGNIFICANT CHANGE UP (ref 3.5–5.3)
PROT SERPL-MCNC: 5.5 G/DL — LOW (ref 6–8.3)
RBC # BLD: 2.63 M/UL — LOW (ref 4.2–5.8)
RBC # FLD: 18.2 % — HIGH (ref 10.3–14.5)
SAO2 % BLDA: 99 % — SIGNIFICANT CHANGE UP
SODIUM SERPL-SCNC: 145 MMOL/L — SIGNIFICANT CHANGE UP (ref 135–145)
WBC # BLD: 9.29 K/UL — SIGNIFICANT CHANGE UP (ref 3.8–10.5)
WBC # FLD AUTO: 9.29 K/UL — SIGNIFICANT CHANGE UP (ref 3.8–10.5)

## 2021-05-01 PROCEDURE — 71045 X-RAY EXAM CHEST 1 VIEW: CPT | Mod: 26

## 2021-05-01 RX ORDER — ACETAMINOPHEN 500 MG
1000 TABLET ORAL ONCE
Refills: 0 | Status: COMPLETED | OUTPATIENT
Start: 2021-05-01 | End: 2021-05-01

## 2021-05-01 RX ORDER — SENNA PLUS 8.6 MG/1
2 TABLET ORAL AT BEDTIME
Refills: 0 | Status: DISCONTINUED | OUTPATIENT
Start: 2021-05-01 | End: 2021-05-06

## 2021-05-01 RX ORDER — POLYETHYLENE GLYCOL 3350 17 G/17G
17 POWDER, FOR SOLUTION ORAL
Refills: 0 | Status: DISCONTINUED | OUTPATIENT
Start: 2021-05-01 | End: 2021-05-08

## 2021-05-01 RX ORDER — HYDROMORPHONE HYDROCHLORIDE 2 MG/ML
1 INJECTION INTRAMUSCULAR; INTRAVENOUS; SUBCUTANEOUS ONCE
Refills: 0 | Status: DISCONTINUED | OUTPATIENT
Start: 2021-05-01 | End: 2021-05-01

## 2021-05-01 RX ORDER — METHADONE HYDROCHLORIDE 40 MG/1
5 TABLET ORAL
Refills: 0 | Status: DISCONTINUED | OUTPATIENT
Start: 2021-05-01 | End: 2021-05-06

## 2021-05-01 RX ORDER — METHADONE HYDROCHLORIDE 40 MG/1
5 TABLET ORAL
Refills: 0 | Status: DISCONTINUED | OUTPATIENT
Start: 2021-05-01 | End: 2021-05-01

## 2021-05-01 RX ADMIN — DEXMEDETOMIDINE HYDROCHLORIDE IN 0.9% SODIUM CHLORIDE 4.2 MICROGRAM(S)/KG/HR: 4 INJECTION INTRAVENOUS at 05:09

## 2021-05-01 RX ADMIN — CHLORHEXIDINE GLUCONATE 1 APPLICATION(S): 213 SOLUTION TOPICAL at 06:08

## 2021-05-01 RX ADMIN — METHADONE HYDROCHLORIDE 5 MILLIGRAM(S): 40 TABLET ORAL at 17:16

## 2021-05-01 RX ADMIN — POLYETHYLENE GLYCOL 3350 17 GRAM(S): 17 POWDER, FOR SOLUTION ORAL at 08:58

## 2021-05-01 RX ADMIN — CHLORHEXIDINE GLUCONATE 15 MILLILITER(S): 213 SOLUTION TOPICAL at 06:09

## 2021-05-01 RX ADMIN — Medication 40 MILLIGRAM(S): at 06:09

## 2021-05-01 RX ADMIN — DEXMEDETOMIDINE HYDROCHLORIDE IN 0.9% SODIUM CHLORIDE 4.2 MICROGRAM(S)/KG/HR: 4 INJECTION INTRAVENOUS at 17:19

## 2021-05-01 RX ADMIN — Medication 1: at 17:17

## 2021-05-01 RX ADMIN — Medication 4 MILLIGRAM(S): at 08:44

## 2021-05-01 RX ADMIN — OLANZAPINE 5 MILLIGRAM(S): 15 TABLET, FILM COATED ORAL at 06:08

## 2021-05-01 RX ADMIN — DEXMEDETOMIDINE HYDROCHLORIDE IN 0.9% SODIUM CHLORIDE 4.2 MICROGRAM(S)/KG/HR: 4 INJECTION INTRAVENOUS at 14:56

## 2021-05-01 RX ADMIN — DEXMEDETOMIDINE HYDROCHLORIDE IN 0.9% SODIUM CHLORIDE 4.2 MICROGRAM(S)/KG/HR: 4 INJECTION INTRAVENOUS at 11:24

## 2021-05-01 RX ADMIN — FENTANYL CITRATE 1 PATCH: 50 INJECTION INTRAVENOUS at 07:48

## 2021-05-01 RX ADMIN — POLYETHYLENE GLYCOL 3350 17 GRAM(S): 17 POWDER, FOR SOLUTION ORAL at 17:16

## 2021-05-01 RX ADMIN — FENTANYL CITRATE 50 MICROGRAM(S): 50 INJECTION INTRAVENOUS at 02:22

## 2021-05-01 RX ADMIN — HYDROMORPHONE HYDROCHLORIDE 1 MILLIGRAM(S): 2 INJECTION INTRAMUSCULAR; INTRAVENOUS; SUBCUTANEOUS at 21:27

## 2021-05-01 RX ADMIN — DEXMEDETOMIDINE HYDROCHLORIDE IN 0.9% SODIUM CHLORIDE 4.2 MICROGRAM(S)/KG/HR: 4 INJECTION INTRAVENOUS at 01:23

## 2021-05-01 RX ADMIN — Medication 1000 MILLIGRAM(S): at 09:22

## 2021-05-01 RX ADMIN — PANTOPRAZOLE SODIUM 40 MILLIGRAM(S): 20 TABLET, DELAYED RELEASE ORAL at 17:16

## 2021-05-01 RX ADMIN — OLANZAPINE 5 MILLIGRAM(S): 15 TABLET, FILM COATED ORAL at 17:16

## 2021-05-01 RX ADMIN — HYDROMORPHONE HYDROCHLORIDE 1 MILLIGRAM(S): 2 INJECTION INTRAMUSCULAR; INTRAVENOUS; SUBCUTANEOUS at 02:56

## 2021-05-01 RX ADMIN — DEXMEDETOMIDINE HYDROCHLORIDE IN 0.9% SODIUM CHLORIDE 4.2 MICROGRAM(S)/KG/HR: 4 INJECTION INTRAVENOUS at 20:49

## 2021-05-01 RX ADMIN — CHLORHEXIDINE GLUCONATE 15 MILLILITER(S): 213 SOLUTION TOPICAL at 17:16

## 2021-05-01 RX ADMIN — DEXMEDETOMIDINE HYDROCHLORIDE IN 0.9% SODIUM CHLORIDE 4.2 MICROGRAM(S)/KG/HR: 4 INJECTION INTRAVENOUS at 07:50

## 2021-05-01 RX ADMIN — FENTANYL CITRATE 1 PATCH: 50 INJECTION INTRAVENOUS at 20:40

## 2021-05-01 RX ADMIN — HYDROMORPHONE HYDROCHLORIDE 1 MILLIGRAM(S): 2 INJECTION INTRAMUSCULAR; INTRAVENOUS; SUBCUTANEOUS at 20:49

## 2021-05-01 RX ADMIN — SENNA PLUS 2 TABLET(S): 8.6 TABLET ORAL at 22:06

## 2021-05-01 RX ADMIN — Medication 1000 MILLIGRAM(S): at 11:27

## 2021-05-01 RX ADMIN — PANTOPRAZOLE SODIUM 40 MILLIGRAM(S): 20 TABLET, DELAYED RELEASE ORAL at 06:08

## 2021-05-01 RX ADMIN — Medication 400 MILLIGRAM(S): at 08:56

## 2021-05-01 NOTE — PROGRESS NOTE ADULT - PROBLEM SELECTOR PLAN 1
isolation precautions  Cont mechanical ventilation - S.P trach.   Wean as tolerated  Tracheal care  Pulmonary toilet  Supplemental nutrition  ICU management.   Monitor oxygen sat  Monitor LFT, LDH, CRP, D-Dimer, Ferritin and procalcitonin  Vit C, D and zinc supp  Montelukast 10 mgs po Qhs  Daily CXR   G-tube with feeds  Replace lytes  DVT and GI PPX isolation precautions DC  Cont mechanical ventilation - S.P trach.   Wean as tolerated  Tracheal care  Pulmonary toilet  Supplemental nutrition  ICU management.   Monitor oxygen sat  Monitor LFT, LDH, CRP, D-Dimer, Ferritin and procalcitonin  Vit C, D and zinc supp  Montelukast 10 mgs po Qhs  Daily CXR   G-tube with feeds  Replace lytes  Pulmonary toilet  DVT and GI PPX

## 2021-05-01 NOTE — PROGRESS NOTE ADULT - SUBJECTIVE AND OBJECTIVE BOX
Patient is a 55y old  Male who presents with a chief complaint of SOB (01 May 2021 01:26)    pt seen in icu [ x ], reg med floor [   ], bed [ x ], chair at bedside [   ], awake and responsive [  ], mildly sedated, [ x ],  nad [x  ]    andrea [ x ], g-tube feeding [x  ], tracheostomy tube [ x ], cent line [x  ],        Allergies    No Known Allergies        Vitals    T(F): 99.5 (05-01-21 @ 00:06), Max: 99.5 (05-01-21 @ 00:06)  HR: 86 (05-01-21 @ 04:24) (65 - 119)  BP: 132/55 (05-01-21 @ 00:00) (93/44 - 175/78)  RR: 28 (05-01-21 @ 00:00) (25 - 43)  SpO2: 97% (05-01-21 @ 04:24) (89% - 100%)  Wt(kg): --  CAPILLARY BLOOD GLUCOSE      POCT Blood Glucose.: 105 mg/dL (01 May 2021 06:07)      Labs                          8.3    9.74  )-----------( 305      ( 30 Apr 2021 06:15 )             26.4       04-30    144  |  107  |  13  ----------------------------<  144<H>  3.7   |  37<H>  |  0.27<L>    Ca    7.8<L>      30 Apr 2021 06:15  Phos  2.9     04-30  Mg     1.9     05-01    TPro  5.5<L>  /  Alb  1.6<L>  /  TBili  0.5  /  DBili  x   /  AST  17  /  ALT  18  /  AlkPhos  78  04-30            .Sputum Sputum  04-16 @ 04:42   Moderate Enterobacter aerogenes (Carbapenem Resistant)  Normal Respiratory Monserrat present  --  Enterobacter aerogenes (Carbapenem Resistant)      .Urine Clean Catch (Midstream)  03-15 @ 00:52   No growth  --  --          Radiology Results      Meds    MEDICATIONS  (STANDING):  ascorbic acid 1000 milliGRAM(s) Oral daily  chlorhexidine 0.12% Liquid 15 milliLiter(s) Oral Mucosa every 12 hours  chlorhexidine 2% Cloths 1 Application(s) Topical <User Schedule>  dexMEDEtomidine Infusion 0.2 MICROgram(s)/kG/Hr (4.2 mL/Hr) IV Continuous <Continuous>  fentaNYL   Patch  75 MICROgram(s)/Hr. 1 Patch Transdermal every 48 hours  insulin lispro (ADMELOG) corrective regimen sliding scale   SubCutaneous every 6 hours  OLANZapine 5 milliGRAM(s) Oral two times a day  pantoprazole  Injectable 40 milliGRAM(s) IV Push every 12 hours  polyethylene glycol 3350 17 Gram(s) Oral daily  predniSONE   Tablet 40 milliGRAM(s) Oral daily  senna 2 Tablet(s) Oral at bedtime      MEDICATIONS  (PRN):  ALBUTerol    90 MICROgram(s) HFA Inhaler 2 Puff(s) Inhalation every 6 hours PRN Shortness of Breath and/or Wheezing  artificial  tears Solution 1 Drop(s) Both EYES every 4 hours PRN Dry Eyes  LORazepam   Injectable 4 milliGRAM(s) IV Push every 6 hours PRN Agitation  sodium chloride 0.9% lock flush 10 milliLiter(s) IV Push every 1 hour PRN Pre/post blood products, medications, blood draw, and to maintain line patency      Physical Exam    Neuro :  no focal deficits  Respiratory: CTA B/L  CV: RRR, S1S2, no murmurs,   Abdominal: Soft, NT, ND +BS, g- tube in place, dressing cdi  Extremities: b/l ue edema, + peripheral pulses      ASSESSMENT    Hypoxemia 2nd to covid pna   transaminitis  prediabetes  h/o appendectomy  cholecystectomy        PLAN    contact and airborne isolation  d/c remdesevir given covid ab positive noted   completed dexamethasone   started pulse steroids for 3 days - 250mg solumedrol bid now tapered off 4/14/21  cont asa, vit c,    cont albuterol inhaler   pulm f/u  procalcitonin, D-dimer, crp, ldh, ferritin, lactate noted ,    tmx 99.9  cont tylenol prn,   cont robitussin prn   pt on fentanyl, precedex drip   off pressors  s/p intubation 3/29/21   s/p tracheostomy 4/21/21  O2 sat  (84% - 99%) mech vent 60%  O2 via mech vent and taper fio2 as tolerated   vent mgmt as per icu  xray 3/19/21 with pneumomediastinum  rept cxr with New trace right apical pneumothorax. New mild left apical pneumothorax. Grossly stable small pneumomediastinum.  Soft tissue emphysema at the neck bases bilaterally. Grossly stable bilateral pulmonary infiltrates noted.   cxr 2/24 with No evidence of pneumothorax can be appreciated on the available image. This may be related to patient positioning. Evidence of pneumomediastinum and subcutaneous emphysema in the lower neck is again noted. There are patchy bibasilar infiltrates and elevated right hemidiaphragm noted.   cxr 3/29/21 with No significant change bilateral infiltrates. There is a small simple left apical pneumothorax. No significant pleural effusion. Bilateral subcutaneous emphysema similar to prior.   XRs on 7AM and 5PM with no obvious increase of ptx  cxr 4/4/21 with Improving bilateral airspace disease noted    cxr 4/8/21 with Small left pneumothorax noted.  thoracic surg f/u   s/p L chest tube replacement 4/18/21 for recurrence of left pneumothorax   cxr 4/20/21 with Unchanged advanced infiltrates and catheter left chest tube noted   CXR 4/11/21 with : No interval change compared to one day prior. No pneumothorax noted.   unable to flush or aspirate tube fully, noted debris in tubing which was milked out.   Once material removed from tubing able to aspirated and flush fully. Also changed dressing on pigtail which appears to be in good position.   Pigtail not kinked or clamped.   Discussed with ICU, recommend repeating CXR to assess if PTX improved after flushing pigtail.   Flush pigtail PRN.  Daily CXR  Monitor O2 status   s/p tracheostomy 4/21/21   stay sutures out 2 weeks from OR date  cxr 4/21/21 with No evidence of active chest disease. Tracheostomy tube in place otherwise no significant change noted   cxr 4/25/21 with A 40% LEFT pneumothorax. LEFT multi-sidehole pigtail catheter overlies LEFT lower hemithorax.. Bilateral multifocal and diffuse ill-defined airspace opacities..  Follow-up AP portable chest radiograph 4/25/2021 AT 8:58 AM: Residual LEFT 30% upper zone pneumothorax. Otherwise no interval change.noted above..  s/p surgical placement of gastrostomy tube 4/23/2021.   cont on tube feeding  id f/u   No need for further antibiotics as patient completed course of Meropenem  leukocytosis resolved  speutum cx with Enterobacter aerogenes (Carbapenem Resistant) noted above  andrea   rectal tube  lispro ss   prognosis poor   s/p 4 units prbc for anemia  f/u h/h   supplement potassium for hypokalemia  cont current meds  mgmt as per icu          Patient is a 55y old  Male who presents with a chief complaint of SOB (01 May 2021 01:26)    pt seen in icu [ x ], reg med floor [   ], bed [ x ], chair at bedside [   ], awake and responsive [  ], mildly sedated, [ x ],  nad [x  ]    andrea [ x ], g-tube feeding [x  ], tracheostomy tube [ x ], cent line [x  ],        Allergies    No Known Allergies        Vitals    T(F): 99.5 (05-01-21 @ 00:06), Max: 99.5 (05-01-21 @ 00:06)  HR: 86 (05-01-21 @ 04:24) (65 - 119)  BP: 132/55 (05-01-21 @ 00:00) (93/44 - 175/78)  RR: 28 (05-01-21 @ 00:00) (25 - 43)  SpO2: 97% (05-01-21 @ 04:24) (89% - 100%)  Wt(kg): --  CAPILLARY BLOOD GLUCOSE      POCT Blood Glucose.: 105 mg/dL (01 May 2021 06:07)      Labs                          8.3    9.74  )-----------( 305      ( 30 Apr 2021 06:15 )             26.4       04-30    144  |  107  |  13  ----------------------------<  144<H>  3.7   |  37<H>  |  0.27<L>    Ca    7.8<L>      30 Apr 2021 06:15  Phos  2.9     04-30  Mg     1.9     05-01    TPro  5.5<L>  /  Alb  1.6<L>  /  TBili  0.5  /  DBili  x   /  AST  17  /  ALT  18  /  AlkPhos  78  04-30            .Sputum Sputum  04-16 @ 04:42   Moderate Enterobacter aerogenes (Carbapenem Resistant)  Normal Respiratory Monserrat present  --  Enterobacter aerogenes (Carbapenem Resistant)      .Urine Clean Catch (Midstream)  03-15 @ 00:52   No growth  --  --          Radiology Results    < from: Xray Chest 1 View- PORTABLE-Routine (Xray Chest 1 View- PORTABLE-Routine in AM.) (04.30.21 @ 07:45) >  IMPRESSION:  Tracheostomy tube again noted. Left chest tube noted with small left apical pneumothorax unchanged.    Bilateral airspace opacities overall worsening.    Heart size cannot be accurately assessed in this projection.    < end of copied text >      Meds    MEDICATIONS  (STANDING):  ascorbic acid 1000 milliGRAM(s) Oral daily  chlorhexidine 0.12% Liquid 15 milliLiter(s) Oral Mucosa every 12 hours  chlorhexidine 2% Cloths 1 Application(s) Topical <User Schedule>  dexMEDEtomidine Infusion 0.2 MICROgram(s)/kG/Hr (4.2 mL/Hr) IV Continuous <Continuous>  fentaNYL   Patch  75 MICROgram(s)/Hr. 1 Patch Transdermal every 48 hours  insulin lispro (ADMELOG) corrective regimen sliding scale   SubCutaneous every 6 hours  OLANZapine 5 milliGRAM(s) Oral two times a day  pantoprazole  Injectable 40 milliGRAM(s) IV Push every 12 hours  polyethylene glycol 3350 17 Gram(s) Oral daily  predniSONE   Tablet 40 milliGRAM(s) Oral daily  senna 2 Tablet(s) Oral at bedtime      MEDICATIONS  (PRN):  ALBUTerol    90 MICROgram(s) HFA Inhaler 2 Puff(s) Inhalation every 6 hours PRN Shortness of Breath and/or Wheezing  artificial  tears Solution 1 Drop(s) Both EYES every 4 hours PRN Dry Eyes  LORazepam   Injectable 4 milliGRAM(s) IV Push every 6 hours PRN Agitation  sodium chloride 0.9% lock flush 10 milliLiter(s) IV Push every 1 hour PRN Pre/post blood products, medications, blood draw, and to maintain line patency      Physical Exam    Neuro :  no focal deficits  Respiratory: CTA B/L  CV: RRR, S1S2, no murmurs,   Abdominal: Soft, NT, ND +BS, g- tube in place, dressing cdi  Extremities: b/l ue edema, + peripheral pulses      ASSESSMENT    Hypoxemia 2nd to covid pna   transaminitis  prediabetes  h/o appendectomy  cholecystectomy        PLAN    contact and airborne isolation  d/c remdesevir given covid ab positive noted   completed dexamethasone   started pulse steroids for 3 days - 250mg solumedrol bid now tapered off 4/14/21   pt restarted on prednisone 40mg daily  cont asa, vit c,    cont albuterol inhaler   pulm f/u  procalcitonin, D-dimer, crp, ldh, ferritin, lactate noted ,    tmx 99.5  cont tylenol prn,   cont robitussin prn   pt on fentanyl, precedex drip   off pressors  s/p intubation 3/29/21   s/p tracheostomy 4/21/21  O2 sat  (89% - 100%) mech vent 60%  O2 via mech vent and taper fio2 as tolerated   vent mgmt as per icu  xray 3/19/21 with pneumomediastinum  rept cxr with New trace right apical pneumothorax. New mild left apical pneumothorax. Grossly stable small pneumomediastinum.  Soft tissue emphysema at the neck bases bilaterally. Grossly stable bilateral pulmonary infiltrates noted.   cxr 2/24 with No evidence of pneumothorax can be appreciated on the available image. This may be related to patient positioning. Evidence of pneumomediastinum and subcutaneous emphysema in the lower neck is again noted. There are patchy bibasilar infiltrates and elevated right hemidiaphragm noted.   cxr 3/29/21 with No significant change bilateral infiltrates. There is a small simple left apical pneumothorax. No significant pleural effusion. Bilateral subcutaneous emphysema similar to prior.   XRs on 7AM and 5PM with no obvious increase of ptx  cxr 4/4/21 with Improving bilateral airspace disease noted    cxr 4/8/21 with Small left pneumothorax noted.  thoracic surg f/u   s/p L chest tube replacement 4/18/21 for recurrence of left pneumothorax   cxr 4/20/21 with Unchanged advanced infiltrates and catheter left chest tube noted   CXR 4/11/21 with : No interval change compared to one day prior. No pneumothorax noted.   unable to flush or aspirate tube fully, noted debris in tubing which was milked out.   Once material removed from tubing able to aspirated and flush fully. Also changed dressing on pigtail which appears to be in good position.   Pigtail not kinked or clamped.   Discussed with ICU, recommend repeating CXR to assess if PTX improved after flushing pigtail.   Flush pigtail PRN.  Daily CXR  Monitor O2 status   s/p tracheostomy 4/21/21   stay sutures out 2 weeks from OR date  cxr 4/21/21 with No evidence of active chest disease. Tracheostomy tube in place otherwise no significant change noted   cxr 4/25/21 with A 40% LEFT pneumothorax. LEFT multi-sidehole pigtail catheter overlies LEFT lower hemithorax.. Bilateral multifocal and diffuse ill-defined airspace opacities..  Follow-up AP portable chest radiograph 4/25/2021 AT 8:58 AM: Residual LEFT 30% upper zone pneumothorax. Otherwise no interval change.noted    cxr 4/30/21 Tracheostomy tube again noted. Left chest tube noted with small left apical pneumothorax unchanged. Bilateral airspace opacities overall worsening. Heart size cannot be accurately assessed in this projection noted above.  s/p surgical placement of gastrostomy tube 4/23/2021.   cont on tube feeding  id f/u   No need for further antibiotics as patient completed course of Meropenem  leukocytosis resolved  speutum cx with Enterobacter aerogenes (Carbapenem Resistant) noted above  andrea   rectal tube  lispro ss   prognosis poor   s/p 4 units prbc for anemia  f/u h/h   cont current meds  mgmt as per icu

## 2021-05-01 NOTE — PROGRESS NOTE ADULT - SUBJECTIVE AND OBJECTIVE BOX
REMOTE VISIT DUE TO COVID INFECTION    Chart reviewed, pt intubated    MEDICATIONS  (STANDING):  ascorbic acid 1000 milliGRAM(s) Oral daily  chlorhexidine 0.12% Liquid 15 milliLiter(s) Oral Mucosa every 12 hours  chlorhexidine 2% Cloths 1 Application(s) Topical <User Schedule>  dexMEDEtomidine Infusion 0.2 MICROgram(s)/kG/Hr (4.2 mL/Hr) IV Continuous <Continuous>  fentaNYL   Patch  75 MICROgram(s)/Hr. 1 Patch Transdermal every 48 hours  insulin lispro (ADMELOG) corrective regimen sliding scale   SubCutaneous every 6 hours  methadone    Tablet 5 milliGRAM(s) Oral two times a day  OLANZapine 5 milliGRAM(s) Oral two times a day  pantoprazole  Injectable 40 milliGRAM(s) IV Push every 12 hours  polyethylene glycol 3350 17 Gram(s) Oral two times a day  predniSONE   Tablet 40 milliGRAM(s) Oral daily  senna 2 Tablet(s) Oral at bedtime    MEDICATIONS  (PRN):  ALBUTerol    90 MICROgram(s) HFA Inhaler 2 Puff(s) Inhalation every 6 hours PRN Shortness of Breath and/or Wheezing  artificial  tears Solution 1 Drop(s) Both EYES every 4 hours PRN Dry Eyes  LORazepam   Injectable 4 milliGRAM(s) IV Push every 6 hours PRN Agitation  sodium chloride 0.9% lock flush 10 milliLiter(s) IV Push every 1 hour PRN Pre/post blood products, medications, blood draw, and to maintain line patency      ROS  UTO    Vital Signs Last 24 Hrs  T(C): 37.2 (01 May 2021 15:22), Max: 37.9 (01 May 2021 08:44)  T(F): 98.9 (01 May 2021 15:22), Max: 100.3 (01 May 2021 08:44)  HR: 84 (01 May 2021 18:00) (64 - 119)  BP: 130/67 (01 May 2021 18:00) (113/56 - 175/78)  BP(mean): 81 (01 May 2021 18:00) (67 - 99)  RR: 37 (01 May 2021 18:00) (19 - 43)  SpO2: 99% (01 May 2021 18:00) (89% - 100%)                            8.2    9.29  )-----------( 319      ( 01 May 2021 05:59 )             27.1       05-01    145  |  106  |  15  ----------------------------<  116<H>  3.9   |  39<H>  |  0.25<L>    Ca    7.8<L>      01 May 2021 05:59  Phos  3.0     05-01  Mg     1.9     05-01    TPro  5.5<L>  /  Alb  1.6<L>  /  TBili  0.3  /  DBili  x   /  AST  22  /  ALT  21  /  AlkPhos  91  05-01

## 2021-05-01 NOTE — PROGRESS NOTE ADULT - SUBJECTIVE AND OBJECTIVE BOX
INTERVAL HPI/OVERNIGHT EVENTS: Patient gets tachypneic and work of breathing increases. Peak pressure high. S/p Fentanyl push and Ativan , no improvement   s/p 1mg Dilaudid push     PRESSORS: [ ] YES [x ] NO  WHICH:    ANTIBIOTICS:                  DATE STARTED:  ANTIBIOTICS:                  DATE STARTED:  ANTIBIOTICS:                  DATE STARTED:    Antimicrobial:    Cardiovascular:    Pulmonary:  ALBUTerol    90 MICROgram(s) HFA Inhaler 2 Puff(s) Inhalation every 6 hours PRN    Hematalogic:    Other:  artificial  tears Solution 1 Drop(s) Both EYES every 4 hours PRN  ascorbic acid 1000 milliGRAM(s) Oral daily  chlorhexidine 0.12% Liquid 15 milliLiter(s) Oral Mucosa every 12 hours  chlorhexidine 2% Cloths 1 Application(s) Topical <User Schedule>  dexMEDEtomidine Infusion 0.2 MICROgram(s)/kG/Hr IV Continuous <Continuous>  fentaNYL   Patch  75 MICROgram(s)/Hr. 1 Patch Transdermal every 48 hours  insulin lispro (ADMELOG) corrective regimen sliding scale   SubCutaneous every 6 hours  LORazepam   Injectable 4 milliGRAM(s) IV Push every 6 hours PRN  OLANZapine 5 milliGRAM(s) Oral two times a day  pantoprazole  Injectable 40 milliGRAM(s) IV Push every 12 hours  polyethylene glycol 3350 17 Gram(s) Oral daily  predniSONE   Tablet 40 milliGRAM(s) Oral daily  sodium chloride 0.9% lock flush 10 milliLiter(s) IV Push every 1 hour PRN    ALBUTerol    90 MICROgram(s) HFA Inhaler 2 Puff(s) Inhalation every 6 hours PRN  artificial  tears Solution 1 Drop(s) Both EYES every 4 hours PRN  ascorbic acid 1000 milliGRAM(s) Oral daily  chlorhexidine 0.12% Liquid 15 milliLiter(s) Oral Mucosa every 12 hours  chlorhexidine 2% Cloths 1 Application(s) Topical <User Schedule>  dexMEDEtomidine Infusion 0.2 MICROgram(s)/kG/Hr IV Continuous <Continuous>  fentaNYL   Patch  75 MICROgram(s)/Hr. 1 Patch Transdermal every 48 hours  insulin lispro (ADMELOG) corrective regimen sliding scale   SubCutaneous every 6 hours  LORazepam   Injectable 4 milliGRAM(s) IV Push every 6 hours PRN  OLANZapine 5 milliGRAM(s) Oral two times a day  pantoprazole  Injectable 40 milliGRAM(s) IV Push every 12 hours  polyethylene glycol 3350 17 Gram(s) Oral daily  predniSONE   Tablet 40 milliGRAM(s) Oral daily  sodium chloride 0.9% lock flush 10 milliLiter(s) IV Push every 1 hour PRN    Drug Dosing Weight  Height (cm): 167.6 (23 Apr 2021 23:15)  Weight (kg): 83.9 (23 Apr 2021 23:15)  BMI (kg/m2): 29.9 (23 Apr 2021 23:15)  BSA (m2): 1.93 (23 Apr 2021 23:15)    CENTRAL LINE: [ ] YES [ x] NO  LOCATION:   DATE INSERTED:  REMOVE: [ ] YES [ ] NO  EXPLAIN:    RIVERA: [ ] YES [x ] NO    DATE INSERTED:  REMOVE:  [ ] YES [ ] NO  EXPLAIN:    A-LINE:  [ ] YES [x ] NO  LOCATION:   DATE INSERTED:  REMOVE:  [ ] YES [ ] NO  EXPLAIN:    PMH -reviewed admission note, no change since admission  PAST MEDICAL & SURGICAL HISTORY:  No pertinent past medical history    S/P moody  1990s    S/P appendectomy  1990s        ICU Vital Signs Last 24 Hrs  T(C): 37.5 (01 May 2021 00:06), Max: 37.5 (30 Apr 2021 06:00)  T(F): 99.5 (01 May 2021 00:06), Max: 99.5 (30 Apr 2021 06:00)  HR: 93 (01 May 2021 00:11) (65 - 119)  BP: 132/55 (01 May 2021 00:00) (93/44 - 175/78)  BP(mean): 72 (01 May 2021 00:00) (53 - 99)  RR: 28 (01 May 2021 00:00) (25 - 43)  SpO2: 94% (01 May 2021 00:11) (89% - 100%)      ABG - ( 30 Apr 2021 04:32 )  pH, Arterial: 7.44  pH, Blood: x     /  pCO2: 60    /  pO2: 81    / HCO3: 41    / Base Excess: 13.8  /  SaO2: 99                    04-29 @ 07:01  -  04-30 @ 07:00  --------------------------------------------------------  IN: 1955.3 mL / OUT: 1410 mL / NET: 545.3 mL        Mode: AC/ CMV (Assist Control/ Continuous Mandatory Ventilation)  RR (machine): 25  TV (machine): 430  FiO2: 60  PEEP: 3  ITime: 0.7  MAP: 13  PIP: 43      PHYSICAL EXAM:    GENERAL: Abdominal breathing, trach to vent sat above 97%  EYES: EOMI, PERRLA  NECK: Supple, No JVD; Normal thyroid; Trachea midline: No LAD, trach    NERVOUS SYSTEM: sedated, able to follow commands    CHEST/LUNG: No rales, rhonchi, wheezing, breath sounds present bilaterally  HEART: Regular rate and rhythm; No murmurs, no gallops  ABDOMEN: Soft, Nontender, Nondistended; Bowel sounds present, no pain or masses on palpation, G tube in place- functional, no signs of infection   : condom cath in place  EXTREMITIES:  2+ Peripheral Pulses, No clubbing or cyanosis. Bilateral upper extremity edema with ecchymosis   SKIN: warm, intact, no lesions     LABS:  CBC Full  -  ( 30 Apr 2021 06:15 )  WBC Count : 9.74 K/uL  RBC Count : 2.58 M/uL  Hemoglobin : 8.3 g/dL  Hematocrit : 26.4 %  Platelet Count - Automated : 305 K/uL  Mean Cell Volume : 102.3 fl  Mean Cell Hemoglobin : 32.2 pg  Mean Cell Hemoglobin Concentration : 31.4 gm/dL  Auto Neutrophil # : x  Auto Lymphocyte # : x  Auto Monocyte # : x  Auto Eosinophil # : x  Auto Basophil # : x  Auto Neutrophil % : x  Auto Lymphocyte % : x  Auto Monocyte % : x  Auto Eosinophil % : x  Auto Basophil % : x    04-30    144  |  107  |  13  ----------------------------<  144<H>  3.7   |  37<H>  |  0.27<L>    Ca    7.8<L>      30 Apr 2021 06:15  Phos  2.9     04-30  Mg     2.0     04-30    TPro  5.5<L>  /  Alb  1.6<L>  /  TBili  0.5  /  DBili  x   /  AST  17  /  ALT  18  /  AlkPhos  78  04-30            RADIOLOGY & ADDITIONAL STUDIES REVIEWED:  yes    [ ]GOALS OF CARE DISCUSSION WITH PATIENT/FAMILY/PROXY:    CRITICAL CARE TIME SPENT: 35 minutes INTERVAL HPI/OVERNIGHT EVENTS: Patient gets tachypneic and work of breathing increases with high peak pressure. S/p Fentanyl push and Ativan push with no improvement. Patient improved after one dose of Dilaudid push. Patient was restless and diaphoretic with low grade fever, one dose IV Tylenol ordered and started on Methadone.     PRESSORS: [ ] YES [x ] NO      Antimicrobial:    Cardiovascular:    Pulmonary:  ALBUTerol    90 MICROgram(s) HFA Inhaler 2 Puff(s) Inhalation every 6 hours PRN    Hematalogic:    Other:  artificial  tears Solution 1 Drop(s) Both EYES every 4 hours PRN  ascorbic acid 1000 milliGRAM(s) Oral daily  chlorhexidine 0.12% Liquid 15 milliLiter(s) Oral Mucosa every 12 hours  chlorhexidine 2% Cloths 1 Application(s) Topical <User Schedule>  dexMEDEtomidine Infusion 0.2 MICROgram(s)/kG/Hr IV Continuous <Continuous>  fentaNYL   Patch  75 MICROgram(s)/Hr. 1 Patch Transdermal every 48 hours  insulin lispro (ADMELOG) corrective regimen sliding scale   SubCutaneous every 6 hours  LORazepam   Injectable 4 milliGRAM(s) IV Push every 6 hours PRN  OLANZapine 5 milliGRAM(s) Oral two times a day  pantoprazole  Injectable 40 milliGRAM(s) IV Push every 12 hours  polyethylene glycol 3350 17 Gram(s) Oral daily  predniSONE   Tablet 40 milliGRAM(s) Oral daily  sodium chloride 0.9% lock flush 10 milliLiter(s) IV Push every 1 hour PRN    ALBUTerol    90 MICROgram(s) HFA Inhaler 2 Puff(s) Inhalation every 6 hours PRN  artificial  tears Solution 1 Drop(s) Both EYES every 4 hours PRN  ascorbic acid 1000 milliGRAM(s) Oral daily  chlorhexidine 0.12% Liquid 15 milliLiter(s) Oral Mucosa every 12 hours  chlorhexidine 2% Cloths 1 Application(s) Topical <User Schedule>  dexMEDEtomidine Infusion 0.2 MICROgram(s)/kG/Hr IV Continuous <Continuous>  fentaNYL   Patch  75 MICROgram(s)/Hr. 1 Patch Transdermal every 48 hours  insulin lispro (ADMELOG) corrective regimen sliding scale   SubCutaneous every 6 hours  LORazepam   Injectable 4 milliGRAM(s) IV Push every 6 hours PRN  OLANZapine 5 milliGRAM(s) Oral two times a day  pantoprazole  Injectable 40 milliGRAM(s) IV Push every 12 hours  polyethylene glycol 3350 17 Gram(s) Oral daily  predniSONE   Tablet 40 milliGRAM(s) Oral daily  sodium chloride 0.9% lock flush 10 milliLiter(s) IV Push every 1 hour PRN    Drug Dosing Weight  Height (cm): 167.6 (23 Apr 2021 23:15)  Weight (kg): 83.9 (23 Apr 2021 23:15)  BMI (kg/m2): 29.9 (23 Apr 2021 23:15)  BSA (m2): 1.93 (23 Apr 2021 23:15)    CENTRAL LINE: [ ] YES [ x] NO  LOCATION:   DATE INSERTED:  REMOVE: [ ] YES [ ] NO  EXPLAIN:    RIVERA: [ ] YES [x ] NO    DATE INSERTED:  REMOVE:  [ ] YES [ ] NO  EXPLAIN:    A-LINE:  [ ] YES [x ] NO  LOCATION:   DATE INSERTED:  REMOVE:  [ ] YES [ ] NO  EXPLAIN:    PMH -reviewed admission note, no change since admission  PAST MEDICAL & SURGICAL HISTORY:  No pertinent past medical history    S/P moody  1990s    S/P appendectomy  1990s        ICU Vital Signs Last 24 Hrs  T(C): 37.5 (01 May 2021 00:06), Max: 37.5 (30 Apr 2021 06:00)  T(F): 99.5 (01 May 2021 00:06), Max: 99.5 (30 Apr 2021 06:00)  HR: 93 (01 May 2021 00:11) (65 - 119)  BP: 132/55 (01 May 2021 00:00) (93/44 - 175/78)  BP(mean): 72 (01 May 2021 00:00) (53 - 99)  RR: 28 (01 May 2021 00:00) (25 - 43)  SpO2: 94% (01 May 2021 00:11) (89% - 100%)      ABG - ( 30 Apr 2021 04:32 )  pH, Arterial: 7.44  pH, Blood: x     /  pCO2: 60    /  pO2: 81    / HCO3: 41    / Base Excess: 13.8  /  SaO2: 99                    04-29 @ 07:01  -  04-30 @ 07:00  --------------------------------------------------------  IN: 1955.3 mL / OUT: 1410 mL / NET: 545.3 mL        Mode: AC/ CMV (Assist Control/ Continuous Mandatory Ventilation)  RR (machine): 25  TV (machine): 430  FiO2: 60  PEEP: 3  ITime: 0.7  MAP: 13  PIP: 43      PHYSICAL EXAM:    GENERAL: Abdominal breathing, trach to vent sat above 97%  EYES: EOMI, PERRLA  NECK: Supple, No JVD; Normal thyroid; Trachea midline: No LAD, trach    NERVOUS SYSTEM: sedated, able to follow commands    CHEST/LUNG: No rales, rhonchi, wheezing, breath sounds present bilaterally  HEART: Regular rate and rhythm; No murmurs, no gallops  ABDOMEN: Soft, Nontender, Nondistended; Bowel sounds present, no pain or masses on palpation, G tube in place- functional, no signs of infection   : condom cath in place  EXTREMITIES:  2+ Peripheral Pulses, No clubbing or cyanosis. Bilateral upper extremity edema with ecchymosis   SKIN: warm, intact, no lesions     LABS:  CBC Full  -  ( 30 Apr 2021 06:15 )  WBC Count : 9.74 K/uL  RBC Count : 2.58 M/uL  Hemoglobin : 8.3 g/dL  Hematocrit : 26.4 %  Platelet Count - Automated : 305 K/uL  Mean Cell Volume : 102.3 fl  Mean Cell Hemoglobin : 32.2 pg  Mean Cell Hemoglobin Concentration : 31.4 gm/dL  Auto Neutrophil # : x  Auto Lymphocyte # : x  Auto Monocyte # : x  Auto Eosinophil # : x  Auto Basophil # : x  Auto Neutrophil % : x  Auto Lymphocyte % : x  Auto Monocyte % : x  Auto Eosinophil % : x  Auto Basophil % : x    04-30    144  |  107  |  13  ----------------------------<  144<H>  3.7   |  37<H>  |  0.27<L>    Ca    7.8<L>      30 Apr 2021 06:15  Phos  2.9     04-30  Mg     2.0     04-30    TPro  5.5<L>  /  Alb  1.6<L>  /  TBili  0.5  /  DBili  x   /  AST  17  /  ALT  18  /  AlkPhos  78  04-30            RADIOLOGY & ADDITIONAL STUDIES REVIEWED:  yes    [ ]GOALS OF CARE DISCUSSION WITH PATIENT/FAMILY/PROXY:    CRITICAL CARE TIME SPENT: 35 minutes

## 2021-05-01 NOTE — PROGRESS NOTE ADULT - PROBLEM SELECTOR PLAN 3
likely related to Covid-19 infection.   ACS protocol Surgicel removed.   Thoracic sx follow up  Daily  CXR   Management per ICU

## 2021-05-01 NOTE — PROGRESS NOTE ADULT - ATTENDING COMMENTS
55 yr old  man , non smoker with  moody 1990s presented 3/14 with x9 days worsening cough, subjective fevers, and SOB, with x2-3 days dysuria and central, non-radiating, constant CP. Admitted to medicine unit  for acute hypoxic respiratory failure secondary to pna from covid-19 infection .     Assessment:  1. Acute hypoxic respiratory failure  2. Covid-19 infection   3. Transaminitis  4. Prediabetes  5. Bilateral pneumothorax  6. Septic shock - resolved  7. Anemia    Plan   -Hgb stable  -Hematology consult appreciated  -Transfuse to hgb > 7  -S/p tracheostomy placement 4/21   - S/p G-tube 4/23  -Continue tube feedings  -thoracic f/u noted   -Cont. mechanical ventilation   -Chest tube to suction  -Cont. prednisone 40 mg for possible organizing pneumonia as evidence on CT scan  -Fentanyl path, d/c fentanyl effusion  -Ativan for benzo dependence  -add methadone   -monitor biomarkers daily, trending down   -Diarrhea is improved   -PT evaluation  -dvt/gi prophy  -hemodynamic monitoring   -Prognosis is guarded .

## 2021-05-01 NOTE — PROGRESS NOTE ADULT - PROBLEM SELECTOR PROBLEM 3
This patient had a normal colonoscopy at age 50 no other risk factors.  Please update her chart and quality measures to reflect this Chest pain Pneumothorax on left

## 2021-05-01 NOTE — PROGRESS NOTE ADULT - SUBJECTIVE AND OBJECTIVE BOX
Patient is a 55y old  Male who presents with a chief complaint of SOB (01 May 2021 06:40)    Patient is sedated, laying in bed on ventilator via trach but in NAD. Clinically unchanged.      INTERVAL HPI/OVERNIGHT EVENTS:      VITAL SIGNS:  T(F): 100.3 (05-01-21 @ 10:42)  HR: 96 (05-01-21 @ 10:00)  BP: 139/70 (05-01-21 @ 10:00)  RR: 34 (05-01-21 @ 10:00)  SpO2: 95% (05-01-21 @ 10:00)  Wt(kg): --  I&O's Detail    30 Apr 2021 07:01  -  01 May 2021 07:00  --------------------------------------------------------  IN:    Dexmedetomidine: 564.5 mL    FentaNYL: 33.6 mL    Jevity 1.5: 520 mL  Total IN: 1118.1 mL    OUT:    Chest Tube (mL): 50 mL    Incontinent per Condom Catheter (mL): 1300 mL  Total OUT: 1350 mL    Total NET: -231.9 mL      01 May 2021 07:01  -  01 May 2021 11:26  --------------------------------------------------------  IN:    Dexmedetomidine: 94.5 mL    Enteral Tube Flush: 160 mL    IV PiggyBack: 100 mL    Jevity 1.5: 120 mL  Total IN: 474.5 mL    OUT:    Incontinent per Condom Catheter (mL): 325 mL  Total OUT: 325 mL    Total NET: 149.5 mL        Mode: AC/ CMV (Assist Control/ Continuous Mandatory Ventilation)  RR (machine): 20  TV (machine): 400  FiO2: 60  PEEP: 3  ITime: 0.7  MAP: 21  PIP: 48        REVIEW OF SYSTEMS:    CONSTITUTIONAL:  No fevers, chills, sweats    HEENT:  Eyes:  No diplopia or blurred vision. ENT:  No earache, sore throat or runny nose.    CARDIOVASCULAR:  No pressure, squeezing, tightness, or heaviness about the chest; no palpitations.    RESPIRATORY:  Per HPI    GASTROINTESTINAL:  No abdominal pain, nausea, vomiting or diarrhea.    GENITOURINARY:  No dysuria, frequency or urgency.    NEUROLOGIC:  No paresthesias, fasciculations, seizures or weakness.    PSYCHIATRIC:  No disorder of thought or mood.      PHYSICAL EXAM:    Constitutional: Well developed and nourished  Eyes:Perrla  ENMT: normal  Neck:supple  Respiratory: good air entry  Cardiovascular: S1 S2 regular  Gastrointestinal: Soft, Non tender  Extremities: No edema  Vascular:normal  Neurological Sedated   Musculoskeletal:Normal      MEDICATIONS  (STANDING):  ascorbic acid 1000 milliGRAM(s) Oral daily  chlorhexidine 0.12% Liquid 15 milliLiter(s) Oral Mucosa every 12 hours  chlorhexidine 2% Cloths 1 Application(s) Topical <User Schedule>  dexMEDEtomidine Infusion 0.2 MICROgram(s)/kG/Hr (4.2 mL/Hr) IV Continuous <Continuous>  fentaNYL   Patch  75 MICROgram(s)/Hr. 1 Patch Transdermal every 48 hours  insulin lispro (ADMELOG) corrective regimen sliding scale   SubCutaneous every 6 hours  OLANZapine 5 milliGRAM(s) Oral two times a day  pantoprazole  Injectable 40 milliGRAM(s) IV Push every 12 hours  polyethylene glycol 3350 17 Gram(s) Oral two times a day  predniSONE   Tablet 40 milliGRAM(s) Oral daily  senna 2 Tablet(s) Oral at bedtime    MEDICATIONS  (PRN):  ALBUTerol    90 MICROgram(s) HFA Inhaler 2 Puff(s) Inhalation every 6 hours PRN Shortness of Breath and/or Wheezing  artificial  tears Solution 1 Drop(s) Both EYES every 4 hours PRN Dry Eyes  LORazepam   Injectable 4 milliGRAM(s) IV Push every 6 hours PRN Agitation  sodium chloride 0.9% lock flush 10 milliLiter(s) IV Push every 1 hour PRN Pre/post blood products, medications, blood draw, and to maintain line patency      Allergies    No Known Allergies    Intolerances        LABS:                        8.2    9.29  )-----------( 319      ( 01 May 2021 05:59 )             27.1     05-01    145  |  106  |  15  ----------------------------<  116<H>  3.9   |  39<H>  |  0.25<L>    Ca    7.8<L>      01 May 2021 05:59  Phos  3.0     05-01  Mg     1.9     05-01    TPro  5.5<L>  /  Alb  1.6<L>  /  TBili  0.3  /  DBili  x   /  AST  22  /  ALT  21  /  AlkPhos  91  05-01        ABG - ( 01 May 2021 04:19 )  pH, Arterial: 7.46  pH, Blood: x     /  pCO2: 59    /  pO2: 89    / HCO3: 42    / Base Excess: 15.2  /  SaO2: 99                    CAPILLARY BLOOD GLUCOSE      POCT Blood Glucose.: 105 mg/dL (01 May 2021 06:07)  POCT Blood Glucose.: 125 mg/dL (30 Apr 2021 23:25)  POCT Blood Glucose.: 175 mg/dL (30 Apr 2021 15:48)  POCT Blood Glucose.: 199 mg/dL (30 Apr 2021 12:07)    pro-bnp -- 04-26 @ 05:42     d-dimer 1434  04-26 @ 05:42      RADIOLOGY & ADDITIONAL TESTS:  < from: Xray Chest 1 View- PORTABLE-Routine (Xray Chest 1 View- PORTABLE-Routine in AM.) (04.30.21 @ 07:45) >  FINDINGS/  IMPRESSION:  Tracheostomy tube again noted. Left chest tube noted with small left apical pneumothorax unchanged.    Bilateral airspace opacities overall worsening.    Heart size cannot be accurately assessed in this projection.    < end of copied text >    Ct scan chest:    ekg;    echo:

## 2021-05-01 NOTE — PROGRESS NOTE ADULT - ASSESSMENT
55 year old male with COVID pneumonia has been on vent.  he also has CT for Pnx.  His H/H has dropped recently without obvious bleeding.  he is off antibiotics.    1. worsening pneumonia  -- f/u pulm, ICU    2. anemia, retic is elevated.  Haptoglobin normal  -- had dropped but now stable again  -- ? daily phlebotomy  -- no hemolysis, Fe/B12/folate adequate  -- monitor CBC    3. hemolysis is unlikely even his baseline haptoglobin may be very elevated due to COVID  pls check direct pamella  COVID is known to cause cold agglutinin    Will follow, [phone]

## 2021-05-02 LAB
ALBUMIN SERPL ELPH-MCNC: 1.8 G/DL — LOW (ref 3.5–5)
ALP SERPL-CCNC: 90 U/L — SIGNIFICANT CHANGE UP (ref 40–120)
ALT FLD-CCNC: 25 U/L DA — SIGNIFICANT CHANGE UP (ref 10–60)
ANION GAP SERPL CALC-SCNC: 0 MMOL/L — LOW (ref 5–17)
AST SERPL-CCNC: 28 U/L — SIGNIFICANT CHANGE UP (ref 10–40)
BASE EXCESS BLDA CALC-SCNC: 16.2 MMOL/L — HIGH (ref -2–3)
BILIRUB SERPL-MCNC: 0.5 MG/DL — SIGNIFICANT CHANGE UP (ref 0.2–1.2)
BLOOD GAS COMMENTS ARTERIAL: SIGNIFICANT CHANGE UP
BUN SERPL-MCNC: 12 MG/DL — SIGNIFICANT CHANGE UP (ref 7–18)
CALCIUM SERPL-MCNC: 7.9 MG/DL — LOW (ref 8.4–10.5)
CHLORIDE SERPL-SCNC: 101 MMOL/L — SIGNIFICANT CHANGE UP (ref 96–108)
CO2 SERPL-SCNC: 40 MMOL/L — HIGH (ref 22–31)
CREAT SERPL-MCNC: <0.2 MG/DL — LOW (ref 0.5–1.3)
GLUCOSE BLDC GLUCOMTR-MCNC: 107 MG/DL — HIGH (ref 70–99)
GLUCOSE BLDC GLUCOMTR-MCNC: 136 MG/DL — HIGH (ref 70–99)
GLUCOSE BLDC GLUCOMTR-MCNC: 160 MG/DL — HIGH (ref 70–99)
GLUCOSE BLDC GLUCOMTR-MCNC: 162 MG/DL — HIGH (ref 70–99)
GLUCOSE SERPL-MCNC: 97 MG/DL — SIGNIFICANT CHANGE UP (ref 70–99)
HCO3 BLDA-SCNC: 43 MMOL/L — HIGH (ref 21–28)
HCT VFR BLD CALC: 29.5 % — LOW (ref 39–50)
HGB BLD-MCNC: 9.1 G/DL — LOW (ref 13–17)
HOROWITZ INDEX BLDA+IHG-RTO: 60 — SIGNIFICANT CHANGE UP
MAGNESIUM SERPL-MCNC: 2.1 MG/DL — SIGNIFICANT CHANGE UP (ref 1.6–2.6)
MCHC RBC-ENTMCNC: 30.8 GM/DL — LOW (ref 32–36)
MCHC RBC-ENTMCNC: 32.2 PG — SIGNIFICANT CHANGE UP (ref 27–34)
MCV RBC AUTO: 104.2 FL — HIGH (ref 80–100)
NRBC # BLD: 0 /100 WBCS — SIGNIFICANT CHANGE UP (ref 0–0)
PCO2 BLDA: 59 MMHG — HIGH (ref 35–48)
PH BLDA: 7.47 — HIGH (ref 7.35–7.45)
PHOSPHATE SERPL-MCNC: 3.7 MG/DL — SIGNIFICANT CHANGE UP (ref 2.5–4.5)
PLATELET # BLD AUTO: 383 K/UL — SIGNIFICANT CHANGE UP (ref 150–400)
PO2 BLDA: 83 MMHG — SIGNIFICANT CHANGE UP (ref 83–108)
POTASSIUM SERPL-MCNC: 3.8 MMOL/L — SIGNIFICANT CHANGE UP (ref 3.5–5.3)
POTASSIUM SERPL-SCNC: 3.8 MMOL/L — SIGNIFICANT CHANGE UP (ref 3.5–5.3)
PROT SERPL-MCNC: 6.1 G/DL — SIGNIFICANT CHANGE UP (ref 6–8.3)
RBC # BLD: 2.83 M/UL — LOW (ref 4.2–5.8)
RBC # FLD: 17.3 % — HIGH (ref 10.3–14.5)
SAO2 % BLDA: 98 % — SIGNIFICANT CHANGE UP
SARS-COV-2 RNA SPEC QL NAA+PROBE: SIGNIFICANT CHANGE UP
SODIUM SERPL-SCNC: 141 MMOL/L — SIGNIFICANT CHANGE UP (ref 135–145)
WBC # BLD: 9.8 K/UL — SIGNIFICANT CHANGE UP (ref 3.8–10.5)
WBC # FLD AUTO: 9.8 K/UL — SIGNIFICANT CHANGE UP (ref 3.8–10.5)

## 2021-05-02 PROCEDURE — 71045 X-RAY EXAM CHEST 1 VIEW: CPT | Mod: 26

## 2021-05-02 RX ORDER — FENTANYL CITRATE 50 UG/ML
1 INJECTION INTRAVENOUS
Refills: 0 | Status: DISCONTINUED | OUTPATIENT
Start: 2021-05-02 | End: 2021-05-06

## 2021-05-02 RX ORDER — METOCLOPRAMIDE HCL 10 MG
10 TABLET ORAL EVERY 6 HOURS
Refills: 0 | Status: COMPLETED | OUTPATIENT
Start: 2021-05-02 | End: 2021-05-03

## 2021-05-02 RX ORDER — FENTANYL CITRATE 50 UG/ML
25 INJECTION INTRAVENOUS ONCE
Refills: 0 | Status: DISCONTINUED | OUTPATIENT
Start: 2021-05-02 | End: 2021-05-02

## 2021-05-02 RX ORDER — HYDROMORPHONE HYDROCHLORIDE 2 MG/ML
1 INJECTION INTRAMUSCULAR; INTRAVENOUS; SUBCUTANEOUS ONCE
Refills: 0 | Status: DISCONTINUED | OUTPATIENT
Start: 2021-05-02 | End: 2021-05-02

## 2021-05-02 RX ORDER — MIDAZOLAM HYDROCHLORIDE 1 MG/ML
2 INJECTION, SOLUTION INTRAMUSCULAR; INTRAVENOUS ONCE
Refills: 0 | Status: DISCONTINUED | OUTPATIENT
Start: 2021-05-02 | End: 2021-05-02

## 2021-05-02 RX ORDER — ADENOSINE 3 MG/ML
6 INJECTION INTRAVENOUS ONCE
Refills: 0 | Status: COMPLETED | OUTPATIENT
Start: 2021-05-02 | End: 2021-05-02

## 2021-05-02 RX ORDER — BACITRACIN ZINC 500 UNIT/G
1 OINTMENT IN PACKET (EA) TOPICAL
Refills: 0 | Status: DISCONTINUED | OUTPATIENT
Start: 2021-05-02 | End: 2021-06-11

## 2021-05-02 RX ADMIN — DEXMEDETOMIDINE HYDROCHLORIDE IN 0.9% SODIUM CHLORIDE 4.2 MICROGRAM(S)/KG/HR: 4 INJECTION INTRAVENOUS at 10:10

## 2021-05-02 RX ADMIN — METHADONE HYDROCHLORIDE 5 MILLIGRAM(S): 40 TABLET ORAL at 05:45

## 2021-05-02 RX ADMIN — Medication 1: at 17:47

## 2021-05-02 RX ADMIN — Medication 40 MILLIGRAM(S): at 05:47

## 2021-05-02 RX ADMIN — FENTANYL CITRATE 1 PATCH: 50 INJECTION INTRAVENOUS at 07:00

## 2021-05-02 RX ADMIN — POLYETHYLENE GLYCOL 3350 17 GRAM(S): 17 POWDER, FOR SOLUTION ORAL at 17:46

## 2021-05-02 RX ADMIN — ADENOSINE 6 MILLIGRAM(S): 3 INJECTION INTRAVENOUS at 18:46

## 2021-05-02 RX ADMIN — MIDAZOLAM HYDROCHLORIDE 2 MILLIGRAM(S): 1 INJECTION, SOLUTION INTRAMUSCULAR; INTRAVENOUS at 18:45

## 2021-05-02 RX ADMIN — OLANZAPINE 5 MILLIGRAM(S): 15 TABLET, FILM COATED ORAL at 05:45

## 2021-05-02 RX ADMIN — SENNA PLUS 2 TABLET(S): 8.6 TABLET ORAL at 21:06

## 2021-05-02 RX ADMIN — Medication 1 APPLICATION(S): at 17:46

## 2021-05-02 RX ADMIN — PANTOPRAZOLE SODIUM 40 MILLIGRAM(S): 20 TABLET, DELAYED RELEASE ORAL at 05:45

## 2021-05-02 RX ADMIN — METHADONE HYDROCHLORIDE 5 MILLIGRAM(S): 40 TABLET ORAL at 17:46

## 2021-05-02 RX ADMIN — Medication 1000 MILLIGRAM(S): at 12:17

## 2021-05-02 RX ADMIN — HYDROMORPHONE HYDROCHLORIDE 1 MILLIGRAM(S): 2 INJECTION INTRAMUSCULAR; INTRAVENOUS; SUBCUTANEOUS at 01:57

## 2021-05-02 RX ADMIN — FENTANYL CITRATE 1 PATCH: 50 INJECTION INTRAVENOUS at 19:06

## 2021-05-02 RX ADMIN — Medication 1: at 12:17

## 2021-05-02 RX ADMIN — FENTANYL CITRATE 25 MICROGRAM(S): 50 INJECTION INTRAVENOUS at 01:38

## 2021-05-02 RX ADMIN — POLYETHYLENE GLYCOL 3350 17 GRAM(S): 17 POWDER, FOR SOLUTION ORAL at 05:45

## 2021-05-02 RX ADMIN — DEXMEDETOMIDINE HYDROCHLORIDE IN 0.9% SODIUM CHLORIDE 4.2 MICROGRAM(S)/KG/HR: 4 INJECTION INTRAVENOUS at 16:55

## 2021-05-02 RX ADMIN — DEXMEDETOMIDINE HYDROCHLORIDE IN 0.9% SODIUM CHLORIDE 4.2 MICROGRAM(S)/KG/HR: 4 INJECTION INTRAVENOUS at 13:50

## 2021-05-02 RX ADMIN — CHLORHEXIDINE GLUCONATE 15 MILLILITER(S): 213 SOLUTION TOPICAL at 17:46

## 2021-05-02 RX ADMIN — CHLORHEXIDINE GLUCONATE 1 APPLICATION(S): 213 SOLUTION TOPICAL at 05:49

## 2021-05-02 RX ADMIN — OLANZAPINE 5 MILLIGRAM(S): 15 TABLET, FILM COATED ORAL at 17:46

## 2021-05-02 RX ADMIN — CHLORHEXIDINE GLUCONATE 15 MILLILITER(S): 213 SOLUTION TOPICAL at 05:45

## 2021-05-02 RX ADMIN — DEXMEDETOMIDINE HYDROCHLORIDE IN 0.9% SODIUM CHLORIDE 4.2 MICROGRAM(S)/KG/HR: 4 INJECTION INTRAVENOUS at 23:06

## 2021-05-02 RX ADMIN — FENTANYL CITRATE 1 PATCH: 50 INJECTION INTRAVENOUS at 05:44

## 2021-05-02 RX ADMIN — HYDROMORPHONE HYDROCHLORIDE 1 MILLIGRAM(S): 2 INJECTION INTRAMUSCULAR; INTRAVENOUS; SUBCUTANEOUS at 07:00

## 2021-05-02 RX ADMIN — FENTANYL CITRATE 25 MICROGRAM(S): 50 INJECTION INTRAVENOUS at 02:56

## 2021-05-02 RX ADMIN — Medication 10 MILLIGRAM(S): at 23:09

## 2021-05-02 RX ADMIN — FENTANYL CITRATE 1 PATCH: 50 INJECTION INTRAVENOUS at 07:10

## 2021-05-02 RX ADMIN — PANTOPRAZOLE SODIUM 40 MILLIGRAM(S): 20 TABLET, DELAYED RELEASE ORAL at 17:46

## 2021-05-02 NOTE — PROGRESS NOTE ADULT - SUBJECTIVE AND OBJECTIVE BOX
Patient is a 55y old  Male who presents with a chief complaint of SOB (02 May 2021 06:48)  Awake, not alert, laying in bed on ventilator via trach. Currently on 60% FIO2  with oxygen sat of 100%    INTERVAL HPI/OVERNIGHT EVENTS:      VITAL SIGNS:  T(F): 99.8 (05-02-21 @ 09:00)  HR: 87 (05-02-21 @ 10:00)  BP: 95/48 (05-02-21 @ 10:00)  RR: 28 (05-02-21 @ 10:00)  SpO2: 100% (05-02-21 @ 10:00)  Wt(kg): --  I&O's Detail    01 May 2021 07:01  -  02 May 2021 07:00  --------------------------------------------------------  IN:    Dexmedetomidine: 724.5 mL    Enteral Tube Flush: 280 mL    IV PiggyBack: 100 mL    Jevity 1.5: 920 mL  Total IN: 2024.5 mL    OUT:    Chest Tube (mL): 70 mL    Incontinent per Condom Catheter (mL): 1600 mL  Total OUT: 1670 mL    Total NET: 354.5 mL      02 May 2021 07:01  -  02 May 2021 11:13  --------------------------------------------------------  IN:    Dexmedetomidine: 126 mL    Jevity 1.5: 160 mL  Total IN: 286 mL    OUT:    Incontinent per Condom Catheter (mL): 625 mL  Total OUT: 625 mL    Total NET: -339 mL        Mode: AC/ CMV (Assist Control/ Continuous Mandatory Ventilation)  RR (machine): 20  TV (machine): 400  FiO2: 60  PEEP: 3  ITime: 0.7  MAP: 11  PIP: 36        REVIEW OF SYSTEMS:    CONSTITUTIONAL:  No fevers, chills, sweats    HEENT:  Eyes:  No diplopia or blurred vision. ENT:  No earache, sore throat or runny nose.    CARDIOVASCULAR:  No pressure, squeezing, tightness, or heaviness about the chest; no palpitations.    RESPIRATORY:  Per HPI    GASTROINTESTINAL:  No abdominal pain, nausea, vomiting or diarrhea.    GENITOURINARY:  No dysuria, frequency or urgency.    NEUROLOGIC:  No paresthesias, fasciculations, seizures or weakness.    PSYCHIATRIC:  No disorder of thought or mood.      PHYSICAL EXAM:    Constitutional: Well developed and nourished  Eyes:Perrla  ENMT: normal  Neck:supple  Respiratory: good air entry  Cardiovascular: S1 S2 regular  Gastrointestinal: Soft, Non tender  Extremities: No edema  Vascular:normal  Neurological:Awake, not alert  Musculoskeletal:Normal      MEDICATIONS  (STANDING):  ascorbic acid 1000 milliGRAM(s) Oral daily  BACItracin   Ointment 1 Application(s) Topical two times a day  chlorhexidine 0.12% Liquid 15 milliLiter(s) Oral Mucosa every 12 hours  chlorhexidine 2% Cloths 1 Application(s) Topical <User Schedule>  dexMEDEtomidine Infusion 0.2 MICROgram(s)/kG/Hr (4.2 mL/Hr) IV Continuous <Continuous>  fentaNYL   Patch  50 MICROgram(s)/Hr. 1 Patch Transdermal every 48 hours  insulin lispro (ADMELOG) corrective regimen sliding scale   SubCutaneous every 6 hours  methadone    Tablet 5 milliGRAM(s) Oral two times a day  OLANZapine 5 milliGRAM(s) Oral two times a day  pantoprazole  Injectable 40 milliGRAM(s) IV Push every 12 hours  polyethylene glycol 3350 17 Gram(s) Oral two times a day  predniSONE   Tablet 40 milliGRAM(s) Oral daily  senna 2 Tablet(s) Oral at bedtime    MEDICATIONS  (PRN):  ALBUTerol    90 MICROgram(s) HFA Inhaler 2 Puff(s) Inhalation every 6 hours PRN Shortness of Breath and/or Wheezing  artificial  tears Solution 1 Drop(s) Both EYES every 4 hours PRN Dry Eyes  LORazepam   Injectable 4 milliGRAM(s) IV Push every 6 hours PRN Agitation  sodium chloride 0.9% lock flush 10 milliLiter(s) IV Push every 1 hour PRN Pre/post blood products, medications, blood draw, and to maintain line patency      Allergies    No Known Allergies    Intolerances        LABS:                        9.1    9.80  )-----------( 383      ( 02 May 2021 06:39 )             29.5     05-02    141  |  101  |  12  ----------------------------<  97  3.8   |  40<H>  |  <0.20<L>    Ca    7.9<L>      02 May 2021 06:39  Phos  3.7     05-02  Mg     2.1     05-02    TPro  6.1  /  Alb  1.8<L>  /  TBili  0.5  /  DBili  x   /  AST  28  /  ALT  25  /  AlkPhos  90  05-02        ABG - ( 02 May 2021 04:30 )  pH, Arterial: 7.47  pH, Blood: x     /  pCO2: 59    /  pO2: 83    / HCO3: 43    / Base Excess: 16.2  /  SaO2: 98                    CAPILLARY BLOOD GLUCOSE      POCT Blood Glucose.: 136 mg/dL (02 May 2021 07:49)  POCT Blood Glucose.: 103 mg/dL (01 May 2021 23:12)  POCT Blood Glucose.: 156 mg/dL (01 May 2021 17:05)  POCT Blood Glucose.: 141 mg/dL (01 May 2021 11:34)    pro-bnp -- 04-26 @ 05:42     d-dimer 1434  04-26 @ 05:42      RADIOLOGY & ADDITIONAL TESTS:    CXR:  < from: Xray Chest 1 View- PORTABLE-Routine (Xray Chest 1 View- PORTABLE-Routine in AM.) (05.01.21 @ 10:26) >  IMPRESSION: Decreased small left apical pneumothorax with chest tube in place.    < end of copied text >    Ct scan chest:    ekg;    echo:

## 2021-05-02 NOTE — PROGRESS NOTE ADULT - SUBJECTIVE AND OBJECTIVE BOX
Patient is a 55y old  Male who presents with a chief complaint of SOB (02 May 2021 02:10)    pt seen in icu [ x ], reg med floor [   ], bed [ x ], chair at bedside [   ], awake and responsive [  ], mildly sedated, [ x ],  nad [x  ]    andrea [ x ], g-tube feeding [x  ], tracheostomy tube [ x ], cent line [x  ],          Allergies    No Known Allergies        Vitals    T(F): 100.2 (05-02-21 @ 06:00), Max: 100.3 (05-01-21 @ 08:44)  HR: 95 (05-02-21 @ 06:00) (64 - 128)  BP: 91/49 (05-02-21 @ 06:00) (91/49 - 167/75)  RR: 20 (05-02-21 @ 06:00) (20 - 41)  SpO2: 100% (05-02-21 @ 06:00) (91% - 100%)  Wt(kg): --  CAPILLARY BLOOD GLUCOSE      POCT Blood Glucose.: 103 mg/dL (01 May 2021 23:12)      Labs                          8.2    9.29  )-----------( 319      ( 01 May 2021 05:59 )             27.1       05-01    145  |  106  |  15  ----------------------------<  116<H>  3.9   |  39<H>  |  0.25<L>    Ca    7.8<L>      01 May 2021 05:59  Phos  3.0     05-01  Mg     1.9     05-01    TPro  5.5<L>  /  Alb  1.6<L>  /  TBili  0.3  /  DBili  x   /  AST  22  /  ALT  21  /  AlkPhos  91  05-01            .Sputum Sputum  04-16 @ 04:42   Moderate Enterobacter aerogenes (Carbapenem Resistant)  Normal Respiratory Monserrat present  --  Enterobacter aerogenes (Carbapenem Resistant)      .Urine Clean Catch (Midstream)  03-15 @ 00:52   No growth  --  --          Radiology Results      Meds    MEDICATIONS  (STANDING):  ascorbic acid 1000 milliGRAM(s) Oral daily  chlorhexidine 0.12% Liquid 15 milliLiter(s) Oral Mucosa every 12 hours  chlorhexidine 2% Cloths 1 Application(s) Topical <User Schedule>  dexMEDEtomidine Infusion 0.2 MICROgram(s)/kG/Hr (4.2 mL/Hr) IV Continuous <Continuous>  fentaNYL   Patch  50 MICROgram(s)/Hr. 1 Patch Transdermal every 48 hours  fentaNYL   Patch  75 MICROgram(s)/Hr. 1 Patch Transdermal every 48 hours  insulin lispro (ADMELOG) corrective regimen sliding scale   SubCutaneous every 6 hours  methadone    Tablet 5 milliGRAM(s) Oral two times a day  OLANZapine 5 milliGRAM(s) Oral two times a day  pantoprazole  Injectable 40 milliGRAM(s) IV Push every 12 hours  polyethylene glycol 3350 17 Gram(s) Oral two times a day  predniSONE   Tablet 40 milliGRAM(s) Oral daily  senna 2 Tablet(s) Oral at bedtime      MEDICATIONS  (PRN):  ALBUTerol    90 MICROgram(s) HFA Inhaler 2 Puff(s) Inhalation every 6 hours PRN Shortness of Breath and/or Wheezing  artificial  tears Solution 1 Drop(s) Both EYES every 4 hours PRN Dry Eyes  LORazepam   Injectable 4 milliGRAM(s) IV Push every 6 hours PRN Agitation  sodium chloride 0.9% lock flush 10 milliLiter(s) IV Push every 1 hour PRN Pre/post blood products, medications, blood draw, and to maintain line patency      Physical Exam    Neuro :  no focal deficits  Respiratory: CTA B/L  CV: RRR, S1S2, no murmurs,   Abdominal: Soft, NT, ND +BS, g- tube in place, dressing cdi  Extremities: b/l ue edema, + peripheral pulses      ASSESSMENT    Hypoxemia 2nd to covid pna   transaminitis  prediabetes  h/o appendectomy  cholecystectomy        PLAN    contact and airborne isolation  d/c remdesevir given covid ab positive noted   completed dexamethasone   started pulse steroids for 3 days - 250mg solumedrol bid now tapered off 4/14/21   pt restarted on prednisone 40mg daily  cont asa, vit c,    cont albuterol inhaler   pulm f/u  procalcitonin, D-dimer, crp, ldh, ferritin, lactate noted ,    tmx 99.5  cont tylenol prn,   cont robitussin prn   pt on fentanyl, precedex drip   off pressors  s/p intubation 3/29/21   s/p tracheostomy 4/21/21  O2 sat  (89% - 100%) mech vent 60%  O2 via mech vent and taper fio2 as tolerated   vent mgmt as per icu  xray 3/19/21 with pneumomediastinum  rept cxr with New trace right apical pneumothorax. New mild left apical pneumothorax. Grossly stable small pneumomediastinum.  Soft tissue emphysema at the neck bases bilaterally. Grossly stable bilateral pulmonary infiltrates noted.   cxr 2/24 with No evidence of pneumothorax can be appreciated on the available image. This may be related to patient positioning. Evidence of pneumomediastinum and subcutaneous emphysema in the lower neck is again noted. There are patchy bibasilar infiltrates and elevated right hemidiaphragm noted.   cxr 3/29/21 with No significant change bilateral infiltrates. There is a small simple left apical pneumothorax. No significant pleural effusion. Bilateral subcutaneous emphysema similar to prior.   XRs on 7AM and 5PM with no obvious increase of ptx  cxr 4/4/21 with Improving bilateral airspace disease noted    cxr 4/8/21 with Small left pneumothorax noted.  thoracic surg f/u   s/p L chest tube replacement 4/18/21 for recurrence of left pneumothorax   cxr 4/20/21 with Unchanged advanced infiltrates and catheter left chest tube noted   CXR 4/11/21 with : No interval change compared to one day prior. No pneumothorax noted.   unable to flush or aspirate tube fully, noted debris in tubing which was milked out.   Once material removed from tubing able to aspirated and flush fully. Also changed dressing on pigtail which appears to be in good position.   Pigtail not kinked or clamped.   Discussed with ICU, recommend repeating CXR to assess if PTX improved after flushing pigtail.   Flush pigtail PRN.  Daily CXR  Monitor O2 status   s/p tracheostomy 4/21/21   stay sutures out 2 weeks from OR date  cxr 4/21/21 with No evidence of active chest disease. Tracheostomy tube in place otherwise no significant change noted   cxr 4/25/21 with A 40% LEFT pneumothorax. LEFT multi-sidehole pigtail catheter overlies LEFT lower hemithorax.. Bilateral multifocal and diffuse ill-defined airspace opacities..  Follow-up AP portable chest radiograph 4/25/2021 AT 8:58 AM: Residual LEFT 30% upper zone pneumothorax. Otherwise no interval change.noted    cxr 4/30/21 Tracheostomy tube again noted. Left chest tube noted with small left apical pneumothorax unchanged. Bilateral airspace opacities overall worsening. Heart size cannot be accurately assessed in this projection noted above.  s/p surgical placement of gastrostomy tube 4/23/2021.   cont on tube feeding  id f/u   No need for further antibiotics as patient completed course of Meropenem  leukocytosis resolved  speutum cx with Enterobacter aerogenes (Carbapenem Resistant) noted above  andrea   rectal tube  lispro ss   prognosis poor   s/p 4 units prbc for anemia  f/u h/h   cont current meds  mgmt as per icu        Patient is a 55y old  Male who presents with a chief complaint of SOB (02 May 2021 02:10)    pt seen in icu [ x ], reg med floor [   ], bed [ x ], chair at bedside [   ], awake and responsive [ x ], mildly sedated, [  ],  nad [x  ]    andrea [ x ], g-tube feeding [x  ], tracheostomy tube [ x ], cent line [x  ],          Allergies    No Known Allergies        Vitals    T(F): 100.2 (05-02-21 @ 06:00), Max: 100.3 (05-01-21 @ 08:44)  HR: 95 (05-02-21 @ 06:00) (64 - 128)  BP: 91/49 (05-02-21 @ 06:00) (91/49 - 167/75)  RR: 20 (05-02-21 @ 06:00) (20 - 41)  SpO2: 100% (05-02-21 @ 06:00) (91% - 100%)  Wt(kg): --  CAPILLARY BLOOD GLUCOSE      POCT Blood Glucose.: 103 mg/dL (01 May 2021 23:12)      Labs                          8.2    9.29  )-----------( 319      ( 01 May 2021 05:59 )             27.1       05-01    145  |  106  |  15  ----------------------------<  116<H>  3.9   |  39<H>  |  0.25<L>    Ca    7.8<L>      01 May 2021 05:59  Phos  3.0     05-01  Mg     1.9     05-01    TPro  5.5<L>  /  Alb  1.6<L>  /  TBili  0.3  /  DBili  x   /  AST  22  /  ALT  21  /  AlkPhos  91  05-01            .Sputum Sputum  04-16 @ 04:42   Moderate Enterobacter aerogenes (Carbapenem Resistant)  Normal Respiratory Monserrat present  --  Enterobacter aerogenes (Carbapenem Resistant)      .Urine Clean Catch (Midstream)  03-15 @ 00:52   No growth  --  --          Radiology Results      Meds    MEDICATIONS  (STANDING):  ascorbic acid 1000 milliGRAM(s) Oral daily  chlorhexidine 0.12% Liquid 15 milliLiter(s) Oral Mucosa every 12 hours  chlorhexidine 2% Cloths 1 Application(s) Topical <User Schedule>  dexMEDEtomidine Infusion 0.2 MICROgram(s)/kG/Hr (4.2 mL/Hr) IV Continuous <Continuous>  fentaNYL   Patch  50 MICROgram(s)/Hr. 1 Patch Transdermal every 48 hours  fentaNYL   Patch  75 MICROgram(s)/Hr. 1 Patch Transdermal every 48 hours  insulin lispro (ADMELOG) corrective regimen sliding scale   SubCutaneous every 6 hours  methadone    Tablet 5 milliGRAM(s) Oral two times a day  OLANZapine 5 milliGRAM(s) Oral two times a day  pantoprazole  Injectable 40 milliGRAM(s) IV Push every 12 hours  polyethylene glycol 3350 17 Gram(s) Oral two times a day  predniSONE   Tablet 40 milliGRAM(s) Oral daily  senna 2 Tablet(s) Oral at bedtime      MEDICATIONS  (PRN):  ALBUTerol    90 MICROgram(s) HFA Inhaler 2 Puff(s) Inhalation every 6 hours PRN Shortness of Breath and/or Wheezing  artificial  tears Solution 1 Drop(s) Both EYES every 4 hours PRN Dry Eyes  LORazepam   Injectable 4 milliGRAM(s) IV Push every 6 hours PRN Agitation  sodium chloride 0.9% lock flush 10 milliLiter(s) IV Push every 1 hour PRN Pre/post blood products, medications, blood draw, and to maintain line patency      Physical Exam    Neuro :  no focal deficits  Respiratory: CTA B/L  CV: RRR, S1S2, no murmurs,   Abdominal: Soft, NT, ND +BS, g- tube in place, dressing cdi  Extremities: b/l ue edema, + peripheral pulses      ASSESSMENT    Hypoxemia 2nd to covid pna   transaminitis  prediabetes  h/o appendectomy  cholecystectomy        PLAN    contact and airborne isolation  d/c remdesevir given covid ab positive noted   completed dexamethasone   started pulse steroids for 3 days - 250mg solumedrol bid now tapered off 4/14/21   cont prednisone 40mg daily  cont asa, vit c,    cont albuterol inhaler   pulm f/u  procalcitonin, D-dimer, crp, ldh, ferritin, lactate noted ,    tmx 100.3  cont tylenol prn,   cont robitussin prn   pt on precedex drip   pt on fentanyl patch  off pressors   s/p intubation 3/29/21   s/p tracheostomy 4/21/21  O2 sat  (91% - 100%) mech vent 60%  O2 via mech vent and taper fio2 as tolerated   vent mgmt as per icu  xray 3/19/21 with pneumomediastinum  rept cxr with New trace right apical pneumothorax. New mild left apical pneumothorax. Grossly stable small pneumomediastinum.  Soft tissue emphysema at the neck bases bilaterally. Grossly stable bilateral pulmonary infiltrates noted.   cxr 2/24 with No evidence of pneumothorax can be appreciated on the available image. This may be related to patient positioning. Evidence of pneumomediastinum and subcutaneous emphysema in the lower neck is again noted. There are patchy bibasilar infiltrates and elevated right hemidiaphragm noted.   cxr 3/29/21 with No significant change bilateral infiltrates. There is a small simple left apical pneumothorax. No significant pleural effusion. Bilateral subcutaneous emphysema similar to prior.   XRs on 7AM and 5PM with no obvious increase of ptx  cxr 4/4/21 with Improving bilateral airspace disease noted    cxr 4/8/21 with Small left pneumothorax noted.  thoracic surg f/u   s/p L chest tube replacement 4/18/21 for recurrence of left pneumothorax   cxr 4/20/21 with Unchanged advanced infiltrates and catheter left chest tube noted   CXR 4/11/21 with : No interval change compared to one day prior. No pneumothorax noted.   unable to flush or aspirate tube fully, noted debris in tubing which was milked out.   Once material removed from tubing able to aspirated and flush fully. Also changed dressing on pigtail which appears to be in good position.   Pigtail not kinked or clamped.   Discussed with ICU, recommend repeating CXR to assess if PTX improved after flushing pigtail.   Flush pigtail PRN.  Daily CXR  Monitor O2 status   s/p tracheostomy 4/21/21   stay sutures out 2 weeks from OR date  cxr 4/21/21 with No evidence of active chest disease. Tracheostomy tube in place otherwise no significant change noted   cxr 4/25/21 with A 40% LEFT pneumothorax. LEFT multi-sidehole pigtail catheter overlies LEFT lower hemithorax.. Bilateral multifocal and diffuse ill-defined airspace opacities..  Follow-up AP portable chest radiograph 4/25/2021 AT 8:58 AM: Residual LEFT 30% upper zone pneumothorax. Otherwise no interval change.noted    cxr 4/30/21 Tracheostomy tube again noted. Left chest tube noted with small left apical pneumothorax unchanged. Bilateral airspace opacities overall worsening. Heart size cannot be accurately assessed in this projection noted above.  s/p surgical placement of gastrostomy tube 4/23/2021.   cont on tube feeding  id f/u   No need for further antibiotics as patient completed course of Meropenem  leukocytosis resolved  speutum cx with Enterobacter aerogenes (Carbapenem Resistant) noted above  andrea   rectal tube  lispro ss   prognosis poor   s/p 4 units prbc for anemia  f/u h/h   cont current meds  mgmt as per icu        Patient is a 55y old  Male who presents with a chief complaint of SOB (02 May 2021 02:10)    pt seen in icu [ x ], reg med floor [   ], bed [ x ], chair at bedside [   ], awake and responsive [ x ], mildly sedated, [  ],  nad [x  ]    andrea [ x ], g-tube feeding [x  ], tracheostomy tube [ x ], cent line [x  ],          Allergies    No Known Allergies        Vitals    T(F): 100.2 (05-02-21 @ 06:00), Max: 100.3 (05-01-21 @ 08:44)  HR: 95 (05-02-21 @ 06:00) (64 - 128)  BP: 91/49 (05-02-21 @ 06:00) (91/49 - 167/75)  RR: 20 (05-02-21 @ 06:00) (20 - 41)  SpO2: 100% (05-02-21 @ 06:00) (91% - 100%)  Wt(kg): --  CAPILLARY BLOOD GLUCOSE      POCT Blood Glucose.: 103 mg/dL (01 May 2021 23:12)      Labs                          8.2    9.29  )-----------( 319      ( 01 May 2021 05:59 )             27.1       05-01    145  |  106  |  15  ----------------------------<  116<H>  3.9   |  39<H>  |  0.25<L>    Ca    7.8<L>      01 May 2021 05:59  Phos  3.0     05-01  Mg     1.9     05-01    TPro  5.5<L>  /  Alb  1.6<L>  /  TBili  0.3  /  DBili  x   /  AST  22  /  ALT  21  /  AlkPhos  91  05-01            .Sputum Sputum  04-16 @ 04:42   Moderate Enterobacter aerogenes (Carbapenem Resistant)  Normal Respiratory Monserrat present  --  Enterobacter aerogenes (Carbapenem Resistant)      .Urine Clean Catch (Midstream)  03-15 @ 00:52   No growth  --  --          Radiology Results      Meds    MEDICATIONS  (STANDING):  ascorbic acid 1000 milliGRAM(s) Oral daily  chlorhexidine 0.12% Liquid 15 milliLiter(s) Oral Mucosa every 12 hours  chlorhexidine 2% Cloths 1 Application(s) Topical <User Schedule>  dexMEDEtomidine Infusion 0.2 MICROgram(s)/kG/Hr (4.2 mL/Hr) IV Continuous <Continuous>  fentaNYL   Patch  50 MICROgram(s)/Hr. 1 Patch Transdermal every 48 hours  fentaNYL   Patch  75 MICROgram(s)/Hr. 1 Patch Transdermal every 48 hours  insulin lispro (ADMELOG) corrective regimen sliding scale   SubCutaneous every 6 hours  methadone    Tablet 5 milliGRAM(s) Oral two times a day  OLANZapine 5 milliGRAM(s) Oral two times a day  pantoprazole  Injectable 40 milliGRAM(s) IV Push every 12 hours  polyethylene glycol 3350 17 Gram(s) Oral two times a day  predniSONE   Tablet 40 milliGRAM(s) Oral daily  senna 2 Tablet(s) Oral at bedtime      MEDICATIONS  (PRN):  ALBUTerol    90 MICROgram(s) HFA Inhaler 2 Puff(s) Inhalation every 6 hours PRN Shortness of Breath and/or Wheezing  artificial  tears Solution 1 Drop(s) Both EYES every 4 hours PRN Dry Eyes  LORazepam   Injectable 4 milliGRAM(s) IV Push every 6 hours PRN Agitation  sodium chloride 0.9% lock flush 10 milliLiter(s) IV Push every 1 hour PRN Pre/post blood products, medications, blood draw, and to maintain line patency      Physical Exam    Neuro :  no focal deficits  Respiratory: CTA B/L  CV: RRR, S1S2, no murmurs,   Abdominal: Soft, NT, ND +BS, g- tube in place, dressing cdi  Extremities: b/l ue edema, + peripheral pulses      ASSESSMENT    Hypoxemia 2nd to covid pna   transaminitis  prediabetes  h/o appendectomy  cholecystectomy        PLAN    contact and airborne isolation  d/c remdesevir given covid ab positive noted   completed dexamethasone   started pulse steroids for 3 days - 250mg solumedrol bid now tapered off 4/14/21   cont prednisone 40mg daily  cont asa, vit c,    cont albuterol inhaler   pulm f/u  procalcitonin, D-dimer, crp, ldh, ferritin, lactate noted ,    tmx 100.3  cont tylenol prn,   cont robitussin prn   pt on precedex drip   pt on fentanyl patch  off pressors   s/p intubation 3/29/21   s/p tracheostomy 4/21/21  O2 sat  (91% - 100%) mech vent 60%  O2 via mech vent and taper fio2 as tolerated   vent mgmt as per icu  xray 3/19/21 with pneumomediastinum  rept cxr with New trace right apical pneumothorax. New mild left apical pneumothorax. Grossly stable small pneumomediastinum.  Soft tissue emphysema at the neck bases bilaterally. Grossly stable bilateral pulmonary infiltrates noted.   cxr 2/24 with No evidence of pneumothorax can be appreciated on the available image. This may be related to patient positioning. Evidence of pneumomediastinum and subcutaneous emphysema in the lower neck is again noted. There are patchy bibasilar infiltrates and elevated right hemidiaphragm noted.   cxr 3/29/21 with No significant change bilateral infiltrates. There is a small simple left apical pneumothorax. No significant pleural effusion. Bilateral subcutaneous emphysema similar to prior.   XRs on 7AM and 5PM with no obvious increase of ptx  cxr 4/4/21 with Improving bilateral airspace disease noted    cxr 4/8/21 with Small left pneumothorax noted.  thoracic surg f/u   s/p L chest tube replacement 4/18/21 for recurrence of left pneumothorax   cxr 4/20/21 with Unchanged advanced infiltrates and catheter left chest tube noted   CXR 4/11/21 with : No interval change compared to one day prior. No pneumothorax noted.   unable to flush or aspirate tube fully, noted debris in tubing which was milked out.   Once material removed from tubing able to aspirated and flush fully. Also changed dressing on pigtail which appears to be in good position.   Pigtail not kinked or clamped.   Discussed with ICU, recommend repeating CXR to assess if PTX improved after flushing pigtail.   Flush pigtail PRN.  Daily CXR  Monitor O2 status   s/p tracheostomy 4/21/21   stay sutures out 2 weeks from OR date  cxr 4/21/21 with No evidence of active chest disease. Tracheostomy tube in place otherwise no significant change noted   cxr 4/25/21 with A 40% LEFT pneumothorax. LEFT multi-sidehole pigtail catheter overlies LEFT lower hemithorax.. Bilateral multifocal and diffuse ill-defined airspace opacities..  Follow-up AP portable chest radiograph 4/25/2021 AT 8:58 AM: Residual LEFT 30% upper zone pneumothorax. Otherwise no interval change.noted    cxr 4/30/21 Tracheostomy tube again noted. Left chest tube noted with small left apical pneumothorax unchanged. Bilateral airspace opacities overall worsening. Heart size cannot be accurately assessed in this projection noted above.  s/p surgical placement of gastrostomy tube 4/23/2021.   cont on tube feeding  id f/u   No need for further antibiotics as patient completed course of Meropenem  leukocytosis resolved  speutum cx with Enterobacter aerogenes (Carbapenem Resistant) noted above  andrea   rectal tube  lispro ss   prognosis poor   s/p 4 units prbc for anemia  f/u h/h    heme onc cons noted  retic is elevated.  Haptoglobin normal  had dropped but now stable again  ? daily phlebotomy  no hemolysis, Fe/B12/folate adequate  hemolysis is unlikely even his baseline haptoglobin may be very elevated due to COVID  pls check direct pamella  COVID is known to cause cold agglutinin  cont current meds  mgmt as per icu

## 2021-05-02 NOTE — PROGRESS NOTE ADULT - PROBLEM SELECTOR PLAN 1
isolation precautions DC  Cont mechanical ventilation - S.P trach.   Wean as tolerated  Tracheal care  Pulmonary toilet  Supplemental nutrition  ICU management.   Monitor oxygen sat  Monitor LFT, LDH, CRP, D-Dimer, Ferritin and procalcitonin  Vit C, D and zinc supp  Montelukast 10 mgs po Qhs  Daily CXR   G-tube with feeds  Replace lytes  Pulmonary toilet  DVT and GI PPX

## 2021-05-02 NOTE — PROGRESS NOTE ADULT - ATTENDING COMMENTS
55 yr old  man , non smoker with  moody 1990s presented 3/14 with x9 days worsening cough, subjective fevers, and SOB, with x2-3 days dysuria and central, non-radiating, constant CP. Admitted to medicine unit  for acute hypoxic respiratory failure secondary to pna from covid-19 infection .     Assessment:  1. Acute hypoxic respiratory failure  2. Covid-19 infection   3. Transaminitis  4. Prediabetes  5. Bilateral pneumothorax  6. Septic shock - resolved  7. Anemia    Plan   -Hgb stable  -Hematology consult appreciated  -Transfuse to hgb > 7  -Iron studies   -S/p tracheostomy placement 4/21   - S/p G-tube 4/23  -Continue tube feedings  -thoracic f/u noted   -Cont. mechanical ventilation   -Chest tube to suction  -Cont. prednisone 40 mg for possible organizing pneumonia as evidence on CT scan  -Fentanyl path, d/c fentanyl effusion  -Ativan for benzo dependence  -add methadone   -monitor biomarkers daily, trending down   -Diarrhea is improved   -PT evaluation  -dvt/gi prophy  -hemodynamic monitoring   -Prognosis is guarded .

## 2021-05-02 NOTE — PROGRESS NOTE ADULT - SUBJECTIVE AND OBJECTIVE BOX
INTERVAL HPI/OVERNIGHT EVENTS: Patients Hb remained stable, was tachypneic and breathing over the vent so 1 mg push of Dilaudid X 2 was given.    PRESSORS: [ ] YES [ ] NO  WHICH:    ANTIBIOTICS:                      Antimicrobial:    Cardiovascular:    Pulmonary:  ALBUTerol    90 MICROgram(s) HFA Inhaler 2 Puff(s) Inhalation every 6 hours PRN    Hematalogic:    Other:  artificial  tears Solution 1 Drop(s) Both EYES every 4 hours PRN  ascorbic acid 1000 milliGRAM(s) Oral daily  chlorhexidine 0.12% Liquid 15 milliLiter(s) Oral Mucosa every 12 hours  chlorhexidine 2% Cloths 1 Application(s) Topical <User Schedule>  dexMEDEtomidine Infusion 0.2 MICROgram(s)/kG/Hr IV Continuous <Continuous>  fentaNYL   Patch  75 MICROgram(s)/Hr. 1 Patch Transdermal every 48 hours  insulin lispro (ADMELOG) corrective regimen sliding scale   SubCutaneous every 6 hours  LORazepam   Injectable 4 milliGRAM(s) IV Push every 6 hours PRN  methadone    Tablet 5 milliGRAM(s) Oral two times a day  OLANZapine 5 milliGRAM(s) Oral two times a day  pantoprazole  Injectable 40 milliGRAM(s) IV Push every 12 hours  polyethylene glycol 3350 17 Gram(s) Oral two times a day  predniSONE   Tablet 40 milliGRAM(s) Oral daily  senna 2 Tablet(s) Oral at bedtime  sodium chloride 0.9% lock flush 10 milliLiter(s) IV Push every 1 hour PRN    ALBUTerol    90 MICROgram(s) HFA Inhaler 2 Puff(s) Inhalation every 6 hours PRN  artificial  tears Solution 1 Drop(s) Both EYES every 4 hours PRN  ascorbic acid 1000 milliGRAM(s) Oral daily  chlorhexidine 0.12% Liquid 15 milliLiter(s) Oral Mucosa every 12 hours  chlorhexidine 2% Cloths 1 Application(s) Topical <User Schedule>  dexMEDEtomidine Infusion 0.2 MICROgram(s)/kG/Hr IV Continuous <Continuous>  fentaNYL   Patch  75 MICROgram(s)/Hr. 1 Patch Transdermal every 48 hours  insulin lispro (ADMELOG) corrective regimen sliding scale   SubCutaneous every 6 hours  LORazepam   Injectable 4 milliGRAM(s) IV Push every 6 hours PRN  methadone    Tablet 5 milliGRAM(s) Oral two times a day  OLANZapine 5 milliGRAM(s) Oral two times a day  pantoprazole  Injectable 40 milliGRAM(s) IV Push every 12 hours  polyethylene glycol 3350 17 Gram(s) Oral two times a day  predniSONE   Tablet 40 milliGRAM(s) Oral daily  senna 2 Tablet(s) Oral at bedtime  sodium chloride 0.9% lock flush 10 milliLiter(s) IV Push every 1 hour PRN    Drug Dosing Weight  Height (cm): 167.6 (23 Apr 2021 23:15)  Weight (kg): 83.9 (23 Apr 2021 23:15)  BMI (kg/m2): 29.9 (23 Apr 2021 23:15)  BSA (m2): 1.93 (23 Apr 2021 23:15)    CENTRAL LINE: [ ] YES [ ] NO  LOCATION:   DATE INSERTED:  REMOVE: [ ] YES [ ] NO  EXPLAIN:    RIVERA: [ ] YES [ ] NO    DATE INSERTED:  REMOVE:  [ ] YES [ ] NO  EXPLAIN:    A-LINE:  [ ] YES [ ] NO  LOCATION:   DATE INSERTED:  REMOVE:  [ ] YES [ ] NO  EXPLAIN:    PMH -reviewed admission note, no change since admission    ICU Vital Signs Last 24 Hrs  T(C): 36.8 (01 May 2021 19:00), Max: 37.9 (01 May 2021 08:44)  T(F): 98.3 (01 May 2021 19:00), Max: 100.3 (01 May 2021 08:44)  HR: 114 (02 May 2021 00:10) (64 - 114)  BP: 157/68 (01 May 2021 22:00) (117/53 - 167/72)  BP(mean): 89 (01 May 2021 22:00) (68 - 91)  ABP: --  ABP(mean): --  RR: 25 (01 May 2021 22:00) (23 - 40)  SpO2: 93% (02 May 2021 00:10) (91% - 100%)      ABG - ( 01 May 2021 04:19 )  pH, Arterial: 7.46  pH, Blood: x     /  pCO2: 59    /  pO2: 89    / HCO3: 42    / Base Excess: 15.2  /  SaO2: 99                    04-30 @ 07:01  -  05-01 @ 07:00  --------------------------------------------------------  IN: 1118.1 mL / OUT: 1380 mL / NET: -261.9 mL        Mode: AC/ CMV (Assist Control/ Continuous Mandatory Ventilation)  RR (machine): 20  TV (machine): 400  FiO2: 60  PEEP: 3  ITime: 1  MAP: 17  PIP: 45      PHYSICAL EXAM:    GENERAL: tachypneic trach to vent sat above 97%  EYES: EOMI, PERRLA  NECK: Supple, No JVD; Normal thyroid; Trachea midline: No LAD, trach    NERVOUS SYSTEM: sedated, able to follow commands    CHEST/LUNG: No rales, rhonchi, wheezing, breath sounds present bilaterally  HEART: Regular rate and rhythm; No murmurs, no gallops  ABDOMEN: Soft, Nontender, Nondistended; Bowel sounds present, no pain or masses on palpation, G tube in place- functional, no signs of infection   : condom cath in place  EXTREMITIES:  2+ Peripheral Pulses, No clubbing or cyanosis. Bilateral upper extremity edema with ecchymosis   SKIN: warm, intact, no lesions       LABS:  CBC Full  -  ( 01 May 2021 05:59 )  WBC Count : 9.29 K/uL  RBC Count : 2.63 M/uL  Hemoglobin : 8.2 g/dL  Hematocrit : 27.1 %  Platelet Count - Automated : 319 K/uL  Mean Cell Volume : 103.0 fl  Mean Cell Hemoglobin : 31.2 pg  Mean Cell Hemoglobin Concentration : 30.3 gm/dL  Auto Neutrophil # : x  Auto Lymphocyte # : x  Auto Monocyte # : x  Auto Eosinophil # : x  Auto Basophil # : x  Auto Neutrophil % : x  Auto Lymphocyte % : x  Auto Monocyte % : x  Auto Eosinophil % : x  Auto Basophil % : x    05-01    145  |  106  |  15  ----------------------------<  116<H>  3.9   |  39<H>  |  0.25<L>    Ca    7.8<L>      01 May 2021 05:59  Phos  3.0     05-01  Mg     1.9     05-01    TPro  5.5<L>  /  Alb  1.6<L>  /  TBili  0.3  /  DBili  x   /  AST  22  /  ALT  21  /  AlkPhos  91  05-01            RADIOLOGY & ADDITIONAL STUDIES REVIEWED:  ***    GOALS OF CARE DISCUSSION WITH PATIENT/FAMILY/PROXY:    CRITICAL CARE TIME SPENT: 35 minutes INTERVAL HPI/OVERNIGHT EVENTS: Patients Hb remained stable, was tachypneic and breathing over the vent so 1 mg push of Dilaudid X 2 was given. V: 20/400/60%/3+ on precedex drip at 1.5.     PRESSORS: [ ] YES [ x] NO  WHICH:    ANTIBIOTICS:                      Antimicrobial:    Cardiovascular:    Pulmonary:  ALBUTerol    90 MICROgram(s) HFA Inhaler 2 Puff(s) Inhalation every 6 hours PRN    Hematalogic:    Other:  artificial  tears Solution 1 Drop(s) Both EYES every 4 hours PRN  ascorbic acid 1000 milliGRAM(s) Oral daily  chlorhexidine 0.12% Liquid 15 milliLiter(s) Oral Mucosa every 12 hours  chlorhexidine 2% Cloths 1 Application(s) Topical <User Schedule>  dexMEDEtomidine Infusion 0.2 MICROgram(s)/kG/Hr IV Continuous <Continuous>  fentaNYL   Patch  75 MICROgram(s)/Hr. 1 Patch Transdermal every 48 hours  insulin lispro (ADMELOG) corrective regimen sliding scale   SubCutaneous every 6 hours  LORazepam   Injectable 4 milliGRAM(s) IV Push every 6 hours PRN  methadone    Tablet 5 milliGRAM(s) Oral two times a day  OLANZapine 5 milliGRAM(s) Oral two times a day  pantoprazole  Injectable 40 milliGRAM(s) IV Push every 12 hours  polyethylene glycol 3350 17 Gram(s) Oral two times a day  predniSONE   Tablet 40 milliGRAM(s) Oral daily  senna 2 Tablet(s) Oral at bedtime  sodium chloride 0.9% lock flush 10 milliLiter(s) IV Push every 1 hour PRN    ALBUTerol    90 MICROgram(s) HFA Inhaler 2 Puff(s) Inhalation every 6 hours PRN  artificial  tears Solution 1 Drop(s) Both EYES every 4 hours PRN  ascorbic acid 1000 milliGRAM(s) Oral daily  chlorhexidine 0.12% Liquid 15 milliLiter(s) Oral Mucosa every 12 hours  chlorhexidine 2% Cloths 1 Application(s) Topical <User Schedule>  dexMEDEtomidine Infusion 0.2 MICROgram(s)/kG/Hr IV Continuous <Continuous>  fentaNYL   Patch  75 MICROgram(s)/Hr. 1 Patch Transdermal every 48 hours  insulin lispro (ADMELOG) corrective regimen sliding scale   SubCutaneous every 6 hours  LORazepam   Injectable 4 milliGRAM(s) IV Push every 6 hours PRN  methadone    Tablet 5 milliGRAM(s) Oral two times a day  OLANZapine 5 milliGRAM(s) Oral two times a day  pantoprazole  Injectable 40 milliGRAM(s) IV Push every 12 hours  polyethylene glycol 3350 17 Gram(s) Oral two times a day  predniSONE   Tablet 40 milliGRAM(s) Oral daily  senna 2 Tablet(s) Oral at bedtime  sodium chloride 0.9% lock flush 10 milliLiter(s) IV Push every 1 hour PRN    Drug Dosing Weight  Height (cm): 167.6 (23 Apr 2021 23:15)  Weight (kg): 83.9 (23 Apr 2021 23:15)  BMI (kg/m2): 29.9 (23 Apr 2021 23:15)  BSA (m2): 1.93 (23 Apr 2021 23:15)    CENTRAL LINE: [ ] YES [ ] NO  LOCATION:   DATE INSERTED:  REMOVE: [ ] YES [ ] NO  EXPLAIN:    RIVERA: [ ] YES [ ] NO    DATE INSERTED:  REMOVE:  [ ] YES [ ] NO  EXPLAIN:    A-LINE:  [ ] YES [ ] NO  LOCATION:   DATE INSERTED:  REMOVE:  [ ] YES [ ] NO  EXPLAIN:    PMH -reviewed admission note, no change since admission    ICU Vital Signs Last 24 Hrs  T(C): 36.8 (01 May 2021 19:00), Max: 37.9 (01 May 2021 08:44)  T(F): 98.3 (01 May 2021 19:00), Max: 100.3 (01 May 2021 08:44)  HR: 114 (02 May 2021 00:10) (64 - 114)  BP: 157/68 (01 May 2021 22:00) (117/53 - 167/72)  BP(mean): 89 (01 May 2021 22:00) (68 - 91)  ABP: --  ABP(mean): --  RR: 25 (01 May 2021 22:00) (23 - 40)  SpO2: 93% (02 May 2021 00:10) (91% - 100%)      ABG - ( 01 May 2021 04:19 )  pH, Arterial: 7.46  pH, Blood: x     /  pCO2: 59    /  pO2: 89    / HCO3: 42    / Base Excess: 15.2  /  SaO2: 99          04-30 @ 07:01  -  05-01 @ 07:00  --------------------------------------------------------  IN: 1118.1 mL / OUT: 1380 mL / NET: -261.9 mL        Mode: AC/ CMV (Assist Control/ Continuous Mandatory Ventilation)  RR (machine): 20  TV (machine): 400  FiO2: 60  PEEP: 3  ITime: 1  MAP: 17  PIP: 45      PHYSICAL EXAM:    GENERAL: tachypneic trach to vent sat above 97%  EYES: EOMI, PERRLA  NECK: Supple, No JVD; Normal thyroid; Trachea midline: No LAD, trach    NERVOUS SYSTEM: sedated, able to follow commands    CHEST/LUNG: No rales, rhonchi, wheezing, breath sounds present bilaterally  HEART: Regular rate and rhythm; No murmurs, no gallops  ABDOMEN: Soft, Nontender, Nondistended; Bowel sounds present, no pain or masses on palpation, G tube in place- functional, no signs of infection   : condom cath in place  EXTREMITIES:  2+ Peripheral Pulses, No clubbing or cyanosis. Bilateral upper extremity edema with ecchymosis   SKIN: warm, intact, no lesions       LABS:  CBC Full  -  ( 01 May 2021 05:59 )  WBC Count : 9.29 K/uL  RBC Count : 2.63 M/uL  Hemoglobin : 8.2 g/dL  Hematocrit : 27.1 %  Platelet Count - Automated : 319 K/uL  Mean Cell Volume : 103.0 fl  Mean Cell Hemoglobin : 31.2 pg  Mean Cell Hemoglobin Concentration : 30.3 gm/dL  Auto Neutrophil # : x  Auto Lymphocyte # : x  Auto Monocyte # : x  Auto Eosinophil # : x  Auto Basophil # : x  Auto Neutrophil % : x  Auto Lymphocyte % : x  Auto Monocyte % : x  Auto Eosinophil % : x  Auto Basophil % : x    05-01    145  |  106  |  15  ----------------------------<  116<H>  3.9   |  39<H>  |  0.25<L>    Ca    7.8<L>      01 May 2021 05:59  Phos  3.0     05-01  Mg     1.9     05-01    TPro  5.5<L>  /  Alb  1.6<L>  /  TBili  0.3  /  DBili  x   /  AST  22  /  ALT  21  /  AlkPhos  91  05-01      RADIOLOGY & ADDITIONAL STUDIES REVIEWED:     < from: Xray Chest 1 View- PORTABLE-Routine (Xray Chest 1 View- PORTABLE-Routine in AM.) (05.01.21 @ 10:26) >    EXAM:  XR CHEST PORTABLE ROUTINE 1V                            PROCEDURE DATE:  05/01/2021          INTERPRETATION:  XR CHEST    Single AP view    HISTORY:  Tracheostomy follow-up    Comparison: Chest x-ray one day prior    Tracheostomy.    The cardiac silhouette is within normal limits. Patchy bilateral airspace disease. Decreased small left apical pneumothorax with chest tube in place.    IMPRESSION:   Decreased small left apical pneumothorax with chest tube in place.      BC REGALADO MD;Attending Radiologist  This document has been electronically signed. May  1 2021  1:43PM    < end of copied text >      GOALS OF CARE DISCUSSION WITH PATIENT/FAMILY/PROXY:    CRITICAL CARE TIME SPENT: 35 minutes

## 2021-05-02 NOTE — PROGRESS NOTE ADULT - ASSESSMENT
Assessment and plan: 56 y/o M with no PMH is admitted to ICU for AHRF 2/2 covid19 pna     1. Acute hypoxic respiratory failure  2. ARDS 2/2 Covid pneumonia  3. Transaminitis  4. Prediabetes  5. Abnormal TSH  6. Pneumothorax    =================== Neuro============================  -Alert and oriented x 3 at baseline   -Intubated and sedated 3/29 on Vent  -Trach 4/22 continues on Vent    -Continue Precedex and off Fentanyl drip   -D/c fentanyl patch 75 mcg and started on Methadone 5 mg BID   -Started on Ativan PRN, Not helping much  -s/p Dilaudid 1mg push , Likely developing tolerance to sedatives   -Off versed   -Off propofol   -CT head unremarkable     ================= Cardiovascular==========================  # Hypotension improving  -Off midodrine 5 mg q8hrs   -Off pressors     # TACHYCARDIA: recurrent episodes   -Lopressor PRN pushes   -HR in 110's  -Continue monitoring     ================- Pulm=================================  #Acute hypoxic respiratory failure: secondary to Covid19 pneumonia  #ARDS  -Was on HFNC then got intubated 3/29  -D-dimer initially elevated on presentation, trending down    -s/p Nimbex and Epifanio at different occasions for vent synchrony  -s/p Pigtail on left chest, removed on 4/15  -Large Pneumothorax was noted on 4/18 on left side, s/p chest tube placement to suction since 4/27, still has pneumothorax-   -Trach 4/22 continues on Vent   -Remdesivir was discontinued due to positive antibodies   - Finished Dexamethasone   - Prednisone taper Finished  - Covid19 PCR negative now, off isolation   - C/w prednisone 40 daily for possible organizing pneumonia       # Bacterial Pneumonia  - Stable infiltrate on CXR ,likely developed Bacterial pneumonia , Finished ABx Zosyn, meropenem, s/p 1 dose of Vancomycin  - MRSA negative from 3/26  - Sputum Cx growing few gram negative rods, CRE  - Secretions from the trach, needs frequent suctions   - Elevated peak and plateau pressures, TV and RR rate decreased, will repeat ABG   - Pulm. Dr. Ryan  - ID Dr Anand       #Pneumothorax and pneumomediastinum:   -X-ray chest: remains with left pneumothorax  -Thoracic surgery following  -s/p Chest tube placed 4/8 pigtail to wall suction discontinued on 4/15  -On 4/18 chest tube was placed back on left lung to treat pneumothorax-  chest tube to suction    -CXR 4/26 still shows left lung pneumothorax - CXR 4/29 persistent pneumothorax         ==================ID===================================  Likely bacterial pneumonia   -leukocytosis resolved  -No more fever, On Tylenol PRN only   -Finished Meropenem  -plan as above     ================= Nephro================================  -Pitting edema to both arms, ecchymosis on right AC, blisters on left arm around brachial area    -on condom cath   -monitor I/Os , good urine output overall  -s/p Albumin x 2 days on 4/10  -Lasix 40mg BID with Albumin 25% Q6 for 48 hours to help with urine output and fluid status.( 4/15) Albumin finished , was D/jennifer on 4/19, urine output decreasing  - Patient can get Lasix as needed   - Metabolic Alkalosis likely due to compensation, improving post Diamox     =================GI====================================  Transaminitis:   likely secondary to Covid19   - and  on presentation   hepatitis panel -ve  -Improved, now normalized   -continue to monitor LFT    Diet:   S/p NGT (3/29) with tube feed  C/w bowel regimen   G tube 4/23, on tube feeds    Off rectal tube       ================ Heme==================================  Elevated d-dimer: likely secondary to Covid19   -D-dimer 423 on admission  -Last D-dimer 1434  -Off prophylactic Lovenox 40 mg daily for concern of bleed (drop in hb)    Anemia  On 4/26 Hb 8.8 dropped to 6.8, s/p 1 unit of PRBC repeat hb 7.2  On 4/27 Hb 7, s/p 2 units of PRBC hb remains 7  On 4/28 Hb 6.8, s/p 1 unit of PRBC hb 7.5 now  4 PRBC total    CTH and CT abdomen w/o contrast no evidence of bleed    No active signs of bleeding (No hematemesis, melena or hematuria)  F/u hemolysis w/u- negative so far, elevated reticulocytes   Dr Andrade on board   F/u CBC daily     =================Endocrine===============================  Prediabetes:  -A1c 5.8  -BS controlled  -continue HSS    Abnormal TSH:  -TSH level noted 0.26,   -Repeat TSH 0.37 and Free T4 1.82    ================= Skin/Catheters============================  No rashes. Peripheral IV lines.   Both arms with pitting edema, right AC with ecchymosis and left AC with blisters     - =================Prophylaxis =============================  Off Lovenox for DVT, on SCD   Protonix for GI proph    ==================GOC==================================   FULL CODE

## 2021-05-03 LAB
ALBUMIN SERPL ELPH-MCNC: 1.5 G/DL — LOW (ref 3.5–5)
ALP SERPL-CCNC: 73 U/L — SIGNIFICANT CHANGE UP (ref 40–120)
ALT FLD-CCNC: 28 U/L DA — SIGNIFICANT CHANGE UP (ref 10–60)
ANION GAP SERPL CALC-SCNC: 0 MMOL/L — LOW (ref 5–17)
AST SERPL-CCNC: 41 U/L — HIGH (ref 10–40)
BASE EXCESS BLDA CALC-SCNC: 15.5 MMOL/L — HIGH (ref -2–3)
BASE EXCESS BLDA CALC-SCNC: 16.6 MMOL/L — HIGH (ref -2–3)
BILIRUB SERPL-MCNC: 0.6 MG/DL — SIGNIFICANT CHANGE UP (ref 0.2–1.2)
BLOOD GAS COMMENTS ARTERIAL: SIGNIFICANT CHANGE UP
BLOOD GAS COMMENTS ARTERIAL: SIGNIFICANT CHANGE UP
BUN SERPL-MCNC: 15 MG/DL — SIGNIFICANT CHANGE UP (ref 7–18)
CALCIUM SERPL-MCNC: 7.8 MG/DL — LOW (ref 8.4–10.5)
CHLORIDE SERPL-SCNC: 102 MMOL/L — SIGNIFICANT CHANGE UP (ref 96–108)
CO2 SERPL-SCNC: 39 MMOL/L — HIGH (ref 22–31)
CREAT SERPL-MCNC: <0.2 MG/DL — LOW (ref 0.5–1.3)
DAT IGG-SP REAG RBC-IMP: SIGNIFICANT CHANGE UP
DIR ANTIGLOB POLYSPECIFIC INTERPRETATION: ABNORMAL
FERRITIN SERPL-MCNC: 749 NG/ML — HIGH (ref 30–400)
GLUCOSE BLDC GLUCOMTR-MCNC: 107 MG/DL — HIGH (ref 70–99)
GLUCOSE BLDC GLUCOMTR-MCNC: 108 MG/DL — HIGH (ref 70–99)
GLUCOSE BLDC GLUCOMTR-MCNC: 135 MG/DL — HIGH (ref 70–99)
GLUCOSE BLDC GLUCOMTR-MCNC: 90 MG/DL — SIGNIFICANT CHANGE UP (ref 70–99)
GLUCOSE SERPL-MCNC: 102 MG/DL — HIGH (ref 70–99)
HCO3 BLDA-SCNC: 44 MMOL/L — HIGH (ref 21–28)
HCO3 BLDA-SCNC: 45 MMOL/L — CRITICAL HIGH (ref 21–28)
HCT VFR BLD CALC: 23 % — LOW (ref 39–50)
HCT VFR BLD CALC: 25.3 % — LOW (ref 39–50)
HGB BLD-MCNC: 7.7 G/DL — LOW (ref 13–17)
HGB BLD-MCNC: 8.2 G/DL — LOW (ref 13–17)
HOROWITZ INDEX BLDA+IHG-RTO: 100 — SIGNIFICANT CHANGE UP
HOROWITZ INDEX BLDA+IHG-RTO: 50 — SIGNIFICANT CHANGE UP
IAT COMP-SP REAG SERPL QL: ABNORMAL
IRON SATN MFR SERPL: 54 UG/DL — LOW (ref 65–170)
MAGNESIUM SERPL-MCNC: 2 MG/DL — SIGNIFICANT CHANGE UP (ref 1.6–2.6)
MCHC RBC-ENTMCNC: 30.4 GM/DL — LOW (ref 32–36)
MCHC RBC-ENTMCNC: 31.3 PG — SIGNIFICANT CHANGE UP (ref 27–34)
MCHC RBC-ENTMCNC: 35.7 GM/DL — SIGNIFICANT CHANGE UP (ref 32–36)
MCHC RBC-ENTMCNC: 38.3 PG — HIGH (ref 27–34)
MCV RBC AUTO: 102.8 FL — HIGH (ref 80–100)
MCV RBC AUTO: 107.5 FL — HIGH (ref 80–100)
NRBC # BLD: 0 /100 WBCS — SIGNIFICANT CHANGE UP (ref 0–0)
NRBC # BLD: 0 /100 WBCS — SIGNIFICANT CHANGE UP (ref 0–0)
PCO2 BLDA: 69 MMHG — HIGH (ref 35–48)
PCO2 BLDA: 71 MMHG — CRITICAL HIGH (ref 35–48)
PH BLDA: 7.4 — SIGNIFICANT CHANGE UP (ref 7.35–7.45)
PH BLDA: 7.42 — SIGNIFICANT CHANGE UP (ref 7.35–7.45)
PHOSPHATE SERPL-MCNC: 2.8 MG/DL — SIGNIFICANT CHANGE UP (ref 2.5–4.5)
PLATELET # BLD AUTO: 277 K/UL — SIGNIFICANT CHANGE UP (ref 150–400)
PLATELET # BLD AUTO: 297 K/UL — SIGNIFICANT CHANGE UP (ref 150–400)
PO2 BLDA: 264 MMHG — HIGH (ref 83–108)
PO2 BLDA: 85 MMHG — SIGNIFICANT CHANGE UP (ref 83–108)
POTASSIUM SERPL-MCNC: 4.2 MMOL/L — SIGNIFICANT CHANGE UP (ref 3.5–5.3)
POTASSIUM SERPL-SCNC: 4.2 MMOL/L — SIGNIFICANT CHANGE UP (ref 3.5–5.3)
PROT SERPL-MCNC: 5.3 G/DL — LOW (ref 6–8.3)
RBC # BLD: 2.14 M/UL — LOW (ref 4.2–5.8)
RBC # BLD: 2.46 M/UL — LOW (ref 4.2–5.8)
RBC # FLD: 16.9 % — HIGH (ref 10.3–14.5)
RBC # FLD: 17.3 % — HIGH (ref 10.3–14.5)
SAO2 % BLDA: 100 % — SIGNIFICANT CHANGE UP
SAO2 % BLDA: 97 % — SIGNIFICANT CHANGE UP
SODIUM SERPL-SCNC: 141 MMOL/L — SIGNIFICANT CHANGE UP (ref 135–145)
TIBC SERPL-MCNC: 192 UG/DL — LOW (ref 250–450)
TRANSFERRIN SERPL-MCNC: 79 MG/DL — LOW (ref 200–360)
WBC # BLD: 7.06 K/UL — SIGNIFICANT CHANGE UP (ref 3.8–10.5)
WBC # BLD: 7.8 K/UL — SIGNIFICANT CHANGE UP (ref 3.8–10.5)
WBC # FLD AUTO: 7.06 K/UL — SIGNIFICANT CHANGE UP (ref 3.8–10.5)
WBC # FLD AUTO: 7.8 K/UL — SIGNIFICANT CHANGE UP (ref 3.8–10.5)

## 2021-05-03 PROCEDURE — 71045 X-RAY EXAM CHEST 1 VIEW: CPT | Mod: 26

## 2021-05-03 RX ORDER — HYDROMORPHONE HYDROCHLORIDE 2 MG/ML
1 INJECTION INTRAMUSCULAR; INTRAVENOUS; SUBCUTANEOUS ONCE
Refills: 0 | Status: DISCONTINUED | OUTPATIENT
Start: 2021-05-03 | End: 2021-05-03

## 2021-05-03 RX ORDER — PROPOFOL 10 MG/ML
10 INJECTION, EMULSION INTRAVENOUS
Qty: 500 | Refills: 0 | Status: DISCONTINUED | OUTPATIENT
Start: 2021-05-03 | End: 2021-05-03

## 2021-05-03 RX ORDER — PROPOFOL 10 MG/ML
20 INJECTION, EMULSION INTRAVENOUS
Qty: 1000 | Refills: 0 | Status: DISCONTINUED | OUTPATIENT
Start: 2021-05-03 | End: 2021-05-04

## 2021-05-03 RX ORDER — HYDROMORPHONE HYDROCHLORIDE 2 MG/ML
1 INJECTION INTRAMUSCULAR; INTRAVENOUS; SUBCUTANEOUS EVERY 8 HOURS
Refills: 0 | Status: DISCONTINUED | OUTPATIENT
Start: 2021-05-03 | End: 2021-05-04

## 2021-05-03 RX ADMIN — HYDROMORPHONE HYDROCHLORIDE 1 MILLIGRAM(S): 2 INJECTION INTRAMUSCULAR; INTRAVENOUS; SUBCUTANEOUS at 21:49

## 2021-05-03 RX ADMIN — PROPOFOL 5.03 MICROGRAM(S)/KG/MIN: 10 INJECTION, EMULSION INTRAVENOUS at 19:38

## 2021-05-03 RX ADMIN — SENNA PLUS 2 TABLET(S): 8.6 TABLET ORAL at 21:49

## 2021-05-03 RX ADMIN — Medication 1000 MILLIGRAM(S): at 11:11

## 2021-05-03 RX ADMIN — PANTOPRAZOLE SODIUM 40 MILLIGRAM(S): 20 TABLET, DELAYED RELEASE ORAL at 18:29

## 2021-05-03 RX ADMIN — Medication 1 APPLICATION(S): at 18:29

## 2021-05-03 RX ADMIN — DEXMEDETOMIDINE HYDROCHLORIDE IN 0.9% SODIUM CHLORIDE 4.2 MICROGRAM(S)/KG/HR: 4 INJECTION INTRAVENOUS at 09:16

## 2021-05-03 RX ADMIN — Medication 4 MILLIGRAM(S): at 08:20

## 2021-05-03 RX ADMIN — CHLORHEXIDINE GLUCONATE 1 APPLICATION(S): 213 SOLUTION TOPICAL at 05:18

## 2021-05-03 RX ADMIN — METHADONE HYDROCHLORIDE 5 MILLIGRAM(S): 40 TABLET ORAL at 19:29

## 2021-05-03 RX ADMIN — FENTANYL CITRATE 1 PATCH: 50 INJECTION INTRAVENOUS at 07:59

## 2021-05-03 RX ADMIN — HYDROMORPHONE HYDROCHLORIDE 1 MILLIGRAM(S): 2 INJECTION INTRAMUSCULAR; INTRAVENOUS; SUBCUTANEOUS at 08:58

## 2021-05-03 RX ADMIN — OLANZAPINE 5 MILLIGRAM(S): 15 TABLET, FILM COATED ORAL at 05:17

## 2021-05-03 RX ADMIN — FENTANYL CITRATE 1 PATCH: 50 INJECTION INTRAVENOUS at 19:16

## 2021-05-03 RX ADMIN — DEXMEDETOMIDINE HYDROCHLORIDE IN 0.9% SODIUM CHLORIDE 4.2 MICROGRAM(S)/KG/HR: 4 INJECTION INTRAVENOUS at 13:20

## 2021-05-03 RX ADMIN — Medication 10 MILLIGRAM(S): at 11:11

## 2021-05-03 RX ADMIN — CHLORHEXIDINE GLUCONATE 15 MILLILITER(S): 213 SOLUTION TOPICAL at 05:18

## 2021-05-03 RX ADMIN — Medication 40 MILLIGRAM(S): at 05:19

## 2021-05-03 RX ADMIN — HYDROMORPHONE HYDROCHLORIDE 1 MILLIGRAM(S): 2 INJECTION INTRAMUSCULAR; INTRAVENOUS; SUBCUTANEOUS at 19:49

## 2021-05-03 RX ADMIN — HYDROMORPHONE HYDROCHLORIDE 1 MILLIGRAM(S): 2 INJECTION INTRAMUSCULAR; INTRAVENOUS; SUBCUTANEOUS at 08:31

## 2021-05-03 RX ADMIN — POLYETHYLENE GLYCOL 3350 17 GRAM(S): 17 POWDER, FOR SOLUTION ORAL at 05:17

## 2021-05-03 RX ADMIN — METHADONE HYDROCHLORIDE 5 MILLIGRAM(S): 40 TABLET ORAL at 05:17

## 2021-05-03 RX ADMIN — Medication 10 MILLIGRAM(S): at 05:18

## 2021-05-03 RX ADMIN — DEXMEDETOMIDINE HYDROCHLORIDE IN 0.9% SODIUM CHLORIDE 4.2 MICROGRAM(S)/KG/HR: 4 INJECTION INTRAVENOUS at 19:38

## 2021-05-03 RX ADMIN — OLANZAPINE 5 MILLIGRAM(S): 15 TABLET, FILM COATED ORAL at 19:30

## 2021-05-03 RX ADMIN — POLYETHYLENE GLYCOL 3350 17 GRAM(S): 17 POWDER, FOR SOLUTION ORAL at 19:30

## 2021-05-03 RX ADMIN — Medication 10 MILLIGRAM(S): at 18:29

## 2021-05-03 RX ADMIN — PANTOPRAZOLE SODIUM 40 MILLIGRAM(S): 20 TABLET, DELAYED RELEASE ORAL at 05:18

## 2021-05-03 RX ADMIN — DEXMEDETOMIDINE HYDROCHLORIDE IN 0.9% SODIUM CHLORIDE 4.2 MICROGRAM(S)/KG/HR: 4 INJECTION INTRAVENOUS at 05:17

## 2021-05-03 RX ADMIN — Medication 1 APPLICATION(S): at 05:17

## 2021-05-03 RX ADMIN — HYDROMORPHONE HYDROCHLORIDE 1 MILLIGRAM(S): 2 INJECTION INTRAMUSCULAR; INTRAVENOUS; SUBCUTANEOUS at 19:37

## 2021-05-03 RX ADMIN — HYDROMORPHONE HYDROCHLORIDE 1 MILLIGRAM(S): 2 INJECTION INTRAMUSCULAR; INTRAVENOUS; SUBCUTANEOUS at 23:09

## 2021-05-03 NOTE — PROGRESS NOTE ADULT - SUBJECTIVE AND OBJECTIVE BOX
Patient is a 55y old  Male who presents with a chief complaint of SOB (02 May 2021 11:12)    pt seen in icu [ x ], reg med floor [   ], bed [ x ], chair at bedside [   ], awake and responsive [ x ], mildly sedated, [  ],    nad [x  ]        Allergies    No Known Allergies        Vitals    T(F): 99 (05-03-21 @ 04:00), Max: 99.8 (05-02-21 @ 09:00)  HR: 68 (05-03-21 @ 05:00) (68 - 122)  BP: 113/59 (05-03-21 @ 05:00) (95/48 - 147/65)  RR: 28 (05-03-21 @ 05:00) (20 - 42)  SpO2: 98% (05-03-21 @ 05:00) (92% - 100%)  Wt(kg): --  CAPILLARY BLOOD GLUCOSE      POCT Blood Glucose.: 107 mg/dL (03 May 2021 06:00)      Labs                          7.7    7.80  )-----------( 297      ( 03 May 2021 04:30 )             25.3       05-03    141  |  102  |  15  ----------------------------<  102<H>  4.2   |  39<H>  |  <0.20<L>    Ca    7.8<L>      03 May 2021 04:30  Phos  2.8     05-03  Mg     2.0     05-03    TPro  5.3<L>  /  Alb  1.5<L>  /  TBili  0.6  /  DBili  x   /  AST  41<H>  /  ALT  28  /  AlkPhos  73  05-03            .Sputum Sputum  04-16 @ 04:42   Moderate Enterobacter aerogenes (Carbapenem Resistant)  Normal Respiratory Monserrat present  --  Enterobacter aerogenes (Carbapenem Resistant)      .Urine Clean Catch (Midstream)  03-15 @ 00:52   No growth  --  --          Radiology Results      Meds    MEDICATIONS  (STANDING):  ascorbic acid 1000 milliGRAM(s) Oral daily  BACItracin   Ointment 1 Application(s) Topical two times a day  chlorhexidine 0.12% Liquid 15 milliLiter(s) Oral Mucosa every 12 hours  chlorhexidine 2% Cloths 1 Application(s) Topical <User Schedule>  dexMEDEtomidine Infusion 0.2 MICROgram(s)/kG/Hr (4.2 mL/Hr) IV Continuous <Continuous>  fentaNYL   Patch  50 MICROgram(s)/Hr. 1 Patch Transdermal every 48 hours  insulin lispro (ADMELOG) corrective regimen sliding scale   SubCutaneous every 6 hours  methadone    Tablet 5 milliGRAM(s) Oral two times a day  metoclopramide Injectable 10 milliGRAM(s) IV Push every 6 hours  OLANZapine 5 milliGRAM(s) Oral two times a day  pantoprazole  Injectable 40 milliGRAM(s) IV Push every 12 hours  polyethylene glycol 3350 17 Gram(s) Oral two times a day  predniSONE   Tablet 40 milliGRAM(s) Oral daily  senna 2 Tablet(s) Oral at bedtime      MEDICATIONS  (PRN):  ALBUTerol    90 MICROgram(s) HFA Inhaler 2 Puff(s) Inhalation every 6 hours PRN Shortness of Breath and/or Wheezing  artificial  tears Solution 1 Drop(s) Both EYES every 4 hours PRN Dry Eyes  LORazepam   Injectable 4 milliGRAM(s) IV Push every 6 hours PRN Agitation  sodium chloride 0.9% lock flush 10 milliLiter(s) IV Push every 1 hour PRN Pre/post blood products, medications, blood draw, and to maintain line patency      Physical Exam    Neuro :  no focal deficits  Respiratory: CTA B/L  CV: RRR, S1S2, no murmurs,   Abdominal: Soft, NT, ND +BS, g- tube in place, dressing cdi  Extremities: b/l ue edema, + peripheral pulses      ASSESSMENT    Hypoxemia 2nd to covid pna   transaminitis  prediabetes  h/o appendectomy  cholecystectomy        PLAN    contact and airborne isolation  d/c remdesevir given covid ab positive noted   completed dexamethasone   started pulse steroids for 3 days - 250mg solumedrol bid now tapered off 4/14/21   cont prednisone 40mg daily  cont asa, vit c,    cont albuterol inhaler   pulm f/u  procalcitonin, D-dimer, crp, ldh, ferritin, lactate noted ,    tmx 100.3  cont tylenol prn,   cont robitussin prn   pt on precedex drip   pt on fentanyl patch  off pressors   s/p intubation 3/29/21   s/p tracheostomy 4/21/21  O2 sat  (91% - 100%) mech vent 60%  O2 via mech vent and taper fio2 as tolerated   vent mgmt as per icu  xray 3/19/21 with pneumomediastinum  rept cxr with New trace right apical pneumothorax. New mild left apical pneumothorax. Grossly stable small pneumomediastinum.  Soft tissue emphysema at the neck bases bilaterally. Grossly stable bilateral pulmonary infiltrates noted.   cxr 2/24 with No evidence of pneumothorax can be appreciated on the available image. This may be related to patient positioning. Evidence of pneumomediastinum and subcutaneous emphysema in the lower neck is again noted. There are patchy bibasilar infiltrates and elevated right hemidiaphragm noted.   cxr 3/29/21 with No significant change bilateral infiltrates. There is a small simple left apical pneumothorax. No significant pleural effusion. Bilateral subcutaneous emphysema similar to prior.   XRs on 7AM and 5PM with no obvious increase of ptx  cxr 4/4/21 with Improving bilateral airspace disease noted    cxr 4/8/21 with Small left pneumothorax noted.  thoracic surg f/u   s/p L chest tube replacement 4/18/21 for recurrence of left pneumothorax   cxr 4/20/21 with Unchanged advanced infiltrates and catheter left chest tube noted   CXR 4/11/21 with : No interval change compared to one day prior. No pneumothorax noted.   unable to flush or aspirate tube fully, noted debris in tubing which was milked out.   Once material removed from tubing able to aspirated and flush fully. Also changed dressing on pigtail which appears to be in good position.   Pigtail not kinked or clamped.   Discussed with ICU, recommend repeating CXR to assess if PTX improved after flushing pigtail.   Flush pigtail PRN.  Daily CXR  Monitor O2 status   s/p tracheostomy 4/21/21   stay sutures out 2 weeks from OR date  cxr 4/21/21 with No evidence of active chest disease. Tracheostomy tube in place otherwise no significant change noted   cxr 4/25/21 with A 40% LEFT pneumothorax. LEFT multi-sidehole pigtail catheter overlies LEFT lower hemithorax.. Bilateral multifocal and diffuse ill-defined airspace opacities..  Follow-up AP portable chest radiograph 4/25/2021 AT 8:58 AM: Residual LEFT 30% upper zone pneumothorax. Otherwise no interval change.noted    cxr 4/30/21 Tracheostomy tube again noted. Left chest tube noted with small left apical pneumothorax unchanged. Bilateral airspace opacities overall worsening. Heart size cannot be accurately assessed in this projection noted above.  s/p surgical placement of gastrostomy tube 4/23/2021.   cont on tube feeding  id f/u   No need for further antibiotics as patient completed course of Meropenem  leukocytosis resolved  speutum cx with Enterobacter aerogenes (Carbapenem Resistant) noted above  andrea   rectal tube  lispro ss   prognosis poor   s/p 4 units prbc for anemia  f/u h/h    heme onc cons noted  retic is elevated.  Haptoglobin normal  had dropped but now stable again  ? daily phlebotomy  no hemolysis, Fe/B12/folate adequate  hemolysis is unlikely even his baseline haptoglobin may be very elevated due to COVID  pls check direct pamella  COVID is known to cause cold agglutinin  cont current meds  mgmt as per icu        Patient is a 55y old  Male who presents with a chief complaint of SOB (02 May 2021 11:12)    pt seen in icu [ x ], reg med floor [   ], bed [ x ], chair at bedside [   ], awake and responsive [ x ], mildly sedated, [  ],    nad [x  ]        Allergies    No Known Allergies        Vitals    T(F): 99 (05-03-21 @ 04:00), Max: 99.8 (05-02-21 @ 09:00)  HR: 68 (05-03-21 @ 05:00) (68 - 122)  BP: 113/59 (05-03-21 @ 05:00) (95/48 - 147/65)  RR: 28 (05-03-21 @ 05:00) (20 - 42)  SpO2: 98% (05-03-21 @ 05:00) (92% - 100%)  Wt(kg): --  CAPILLARY BLOOD GLUCOSE      POCT Blood Glucose.: 107 mg/dL (03 May 2021 06:00)      Labs                          7.7    7.80  )-----------( 297      ( 03 May 2021 04:30 )             25.3       05-03    141  |  102  |  15  ----------------------------<  102<H>  4.2   |  39<H>  |  <0.20<L>    Ca    7.8<L>      03 May 2021 04:30  Phos  2.8     05-03  Mg     2.0     05-03    TPro  5.3<L>  /  Alb  1.5<L>  /  TBili  0.6  /  DBili  x   /  AST  41<H>  /  ALT  28  /  AlkPhos  73  05-03      Direct Pamella IgG (05.03.21 @ 04:41)   Dir Antiglob IgG Interpretation: NEG   Direct Pamella Profile (05.03.21 @ 04:41)   Dir Antiglob Polyspecific Interpretation: POS: 05/03/2021 5:51 GCATHY   >> MAY/03/21 05:51:00 GCATHY JERILYN positive after 5 minute incubation at   room temperature 05/03/2021 5:50 C Rosemary       .Sputum Sputum  04-16 @ 04:42   Moderate Enterobacter aerogenes (Carbapenem Resistant)  Normal Respiratory Monserrat present  --  Enterobacter aerogenes (Carbapenem Resistant)      .Urine Clean Catch (Midstream)  03-15 @ 00:52   No growth  --  --          Radiology Results      Meds    MEDICATIONS  (STANDING):  ascorbic acid 1000 milliGRAM(s) Oral daily  BACItracin   Ointment 1 Application(s) Topical two times a day  chlorhexidine 0.12% Liquid 15 milliLiter(s) Oral Mucosa every 12 hours  chlorhexidine 2% Cloths 1 Application(s) Topical <User Schedule>  dexMEDEtomidine Infusion 0.2 MICROgram(s)/kG/Hr (4.2 mL/Hr) IV Continuous <Continuous>  fentaNYL   Patch  50 MICROgram(s)/Hr. 1 Patch Transdermal every 48 hours  insulin lispro (ADMELOG) corrective regimen sliding scale   SubCutaneous every 6 hours  methadone    Tablet 5 milliGRAM(s) Oral two times a day  metoclopramide Injectable 10 milliGRAM(s) IV Push every 6 hours  OLANZapine 5 milliGRAM(s) Oral two times a day  pantoprazole  Injectable 40 milliGRAM(s) IV Push every 12 hours  polyethylene glycol 3350 17 Gram(s) Oral two times a day  predniSONE   Tablet 40 milliGRAM(s) Oral daily  senna 2 Tablet(s) Oral at bedtime      MEDICATIONS  (PRN):  ALBUTerol    90 MICROgram(s) HFA Inhaler 2 Puff(s) Inhalation every 6 hours PRN Shortness of Breath and/or Wheezing  artificial  tears Solution 1 Drop(s) Both EYES every 4 hours PRN Dry Eyes  LORazepam   Injectable 4 milliGRAM(s) IV Push every 6 hours PRN Agitation  sodium chloride 0.9% lock flush 10 milliLiter(s) IV Push every 1 hour PRN Pre/post blood products, medications, blood draw, and to maintain line patency      Physical Exam    Neuro :  no focal deficits  Respiratory: CTA B/L  CV: RRR, S1S2, no murmurs,   Abdominal: Soft, NT, ND +BS, g- tube in place, dressing cdi  Extremities: b/l ue edema, + peripheral pulses      ASSESSMENT    Hypoxemia 2nd to covid pna   transaminitis  prediabetes  h/o appendectomy  cholecystectomy        PLAN    contact and airborne isolation  d/c remdesevir given covid ab positive noted   completed dexamethasone   started pulse steroids for 3 days - 250mg solumedrol bid now tapered off 4/14/21   cont prednisone 40mg daily  cont asa, vit c,    cont albuterol inhaler   pulm f/u  procalcitonin, D-dimer, crp, ldh, ferritin, lactate noted ,    tmx 99.8   cont tylenol prn,   cont robitussin prn   pt on precedex drip   pt on fentanyl patch  off pressors   s/p intubation 3/29/21   s/p tracheostomy 4/21/21  O2 sat  (91% - 100%) mech vent 60%  O2 via mech vent and taper fio2 as tolerated   vent mgmt as per icu  xray 3/19/21 with pneumomediastinum  rept cxr with New trace right apical pneumothorax. New mild left apical pneumothorax. Grossly stable small pneumomediastinum.  Soft tissue emphysema at the neck bases bilaterally. Grossly stable bilateral pulmonary infiltrates noted.   cxr 2/24 with No evidence of pneumothorax can be appreciated on the available image. This may be related to patient positioning. Evidence of pneumomediastinum and subcutaneous emphysema in the lower neck is again noted. There are patchy bibasilar infiltrates and elevated right hemidiaphragm noted.   cxr 3/29/21 with No significant change bilateral infiltrates. There is a small simple left apical pneumothorax. No significant pleural effusion. Bilateral subcutaneous emphysema similar to prior.   XRs on 7AM and 5PM with no obvious increase of ptx  cxr 4/4/21 with Improving bilateral airspace disease noted    cxr 4/8/21 with Small left pneumothorax noted.  thoracic surg f/u   s/p L chest tube replacement 4/18/21 for recurrence of left pneumothorax   cxr 4/20/21 with Unchanged advanced infiltrates and catheter left chest tube noted   CXR 4/11/21 with : No interval change compared to one day prior. No pneumothorax noted.   unable to flush or aspirate tube fully, noted debris in tubing which was milked out.   Once material removed from tubing able to aspirated and flush fully. Also changed dressing on pigtail which appears to be in good position.   Pigtail not kinked or clamped.   Discussed with ICU, recommend repeating CXR to assess if PTX improved after flushing pigtail.   Flush pigtail PRN.  Daily CXR  Monitor O2 status   s/p tracheostomy 4/21/21   stay sutures out 2 weeks from OR date  cxr 4/21/21 with No evidence of active chest disease. Tracheostomy tube in place otherwise no significant change noted   cxr 4/25/21 with A 40% LEFT pneumothorax. LEFT multi-sidehole pigtail catheter overlies LEFT lower hemithorax.. Bilateral multifocal and diffuse ill-defined airspace opacities..  Follow-up AP portable chest radiograph 4/25/2021 AT 8:58 AM: Residual LEFT 30% upper zone pneumothorax. Otherwise no interval change.noted    cxr 4/30/21 Tracheostomy tube again noted. Left chest tube noted with small left apical pneumothorax unchanged. Bilateral airspace opacities overall worsening. Heart size cannot be accurately assessed in this projection noted.  s/p surgical placement of gastrostomy tube 4/23/2021.   cont on tube feeding  id f/u   No need for further antibiotics as patient completed course of Meropenem  leukocytosis resolved  speutum cx with Enterobacter aerogenes (Carbapenem Resistant) noted above  andrea   lispro ss   prognosis poor   s/p 4 units prbc for anemia  f/u h/h    heme onc f/u  retic is elevated.    Haptoglobin normal  had dropped but now stable again  ? daily phlebotomy  no hemolysis, Fe/B12/folate adequate  hemolysis is unlikely even his baseline haptoglobin may be very elevated due to COVID  direct pamella neg noted above   COVID is known to cause cold agglutinin  cont current meds  mgmt as per icu        Patient is a 55y old  Male who presents with a chief complaint of SOB (02 May 2021 11:12)    pt seen in icu [ x ], reg med floor [   ], bed [ x ], chair at bedside [   ], awake and responsive [ x ], mildly sedated, [  ],    nad [x  ]        Allergies    No Known Allergies        Vitals    T(F): 99 (05-03-21 @ 04:00), Max: 99.8 (05-02-21 @ 09:00)  HR: 68 (05-03-21 @ 05:00) (68 - 122)  BP: 113/59 (05-03-21 @ 05:00) (95/48 - 147/65)  RR: 28 (05-03-21 @ 05:00) (20 - 42)  SpO2: 98% (05-03-21 @ 05:00) (92% - 100%)  Wt(kg): --  CAPILLARY BLOOD GLUCOSE      POCT Blood Glucose.: 107 mg/dL (03 May 2021 06:00)      Labs                          7.7    7.80  )-----------( 297      ( 03 May 2021 04:30 )             25.3       05-03    141  |  102  |  15  ----------------------------<  102<H>  4.2   |  39<H>  |  <0.20<L>    Ca    7.8<L>      03 May 2021 04:30  Phos  2.8     05-03  Mg     2.0     05-03    TPro  5.3<L>  /  Alb  1.5<L>  /  TBili  0.6  /  DBili  x   /  AST  41<H>  /  ALT  28  /  AlkPhos  73  05-03      Direct Pamella IgG (05.03.21 @ 04:41)   Dir Antiglob IgG Interpretation: NEG   Direct Pamella Profile (05.03.21 @ 04:41)   Dir Antiglob Polyspecific Interpretation: POS: 05/03/2021 5:51 GCATHY   >> MAY/03/21 05:51:00 GCATHY JERILYN positive after 5 minute incubation at   room temperature 05/03/2021 5:50 C Rosemary       .Sputum Sputum  04-16 @ 04:42   Moderate Enterobacter aerogenes (Carbapenem Resistant)  Normal Respiratory Monserrat present  --  Enterobacter aerogenes (Carbapenem Resistant)      .Urine Clean Catch (Midstream)  03-15 @ 00:52   No growth  --  --          Radiology Results      Meds    MEDICATIONS  (STANDING):  ascorbic acid 1000 milliGRAM(s) Oral daily  BACItracin   Ointment 1 Application(s) Topical two times a day  chlorhexidine 0.12% Liquid 15 milliLiter(s) Oral Mucosa every 12 hours  chlorhexidine 2% Cloths 1 Application(s) Topical <User Schedule>  dexMEDEtomidine Infusion 0.2 MICROgram(s)/kG/Hr (4.2 mL/Hr) IV Continuous <Continuous>  fentaNYL   Patch  50 MICROgram(s)/Hr. 1 Patch Transdermal every 48 hours  insulin lispro (ADMELOG) corrective regimen sliding scale   SubCutaneous every 6 hours  methadone    Tablet 5 milliGRAM(s) Oral two times a day  metoclopramide Injectable 10 milliGRAM(s) IV Push every 6 hours  OLANZapine 5 milliGRAM(s) Oral two times a day  pantoprazole  Injectable 40 milliGRAM(s) IV Push every 12 hours  polyethylene glycol 3350 17 Gram(s) Oral two times a day  predniSONE   Tablet 40 milliGRAM(s) Oral daily  senna 2 Tablet(s) Oral at bedtime      MEDICATIONS  (PRN):  ALBUTerol    90 MICROgram(s) HFA Inhaler 2 Puff(s) Inhalation every 6 hours PRN Shortness of Breath and/or Wheezing  artificial  tears Solution 1 Drop(s) Both EYES every 4 hours PRN Dry Eyes  LORazepam   Injectable 4 milliGRAM(s) IV Push every 6 hours PRN Agitation  sodium chloride 0.9% lock flush 10 milliLiter(s) IV Push every 1 hour PRN Pre/post blood products, medications, blood draw, and to maintain line patency      Physical Exam    Neuro :  no focal deficits  Respiratory: CTA B/L  CV: RRR, S1S2, no murmurs,   Abdominal: Soft, NT, ND +BS, g- tube in place, dressing cdi  Extremities: b/l ue edema, + peripheral pulses      ASSESSMENT    Hypoxemia 2nd to covid pna   transaminitis  prediabetes  h/o appendectomy  cholecystectomy        PLAN    contact and airborne isolation  d/c remdesevir given covid ab positive noted   completed dexamethasone   started pulse steroids for 3 days - 250mg solumedrol bid now tapered off 4/14/21   cont prednisone 40mg daily  cont asa, vit c,    cont albuterol inhaler   pulm f/u  procalcitonin, D-dimer, crp, ldh, ferritin, lactate noted ,    tmx 99.8   cont tylenol prn,   cont robitussin prn   pt on precedex drip   pt on fentanyl patch  off pressors   s/p intubation 3/29/21   s/p tracheostomy 4/21/21  O2 sat (92% - 100%) mech vent 50%  O2 via mech vent and taper fio2 as tolerated   vent mgmt as per icu  xray 3/19/21 with pneumomediastinum  rept cxr with New trace right apical pneumothorax. New mild left apical pneumothorax. Grossly stable small pneumomediastinum.  Soft tissue emphysema at the neck bases bilaterally. Grossly stable bilateral pulmonary infiltrates noted.   cxr 2/24 with No evidence of pneumothorax can be appreciated on the available image. This may be related to patient positioning. Evidence of pneumomediastinum and subcutaneous emphysema in the lower neck is again noted. There are patchy bibasilar infiltrates and elevated right hemidiaphragm noted.   cxr 3/29/21 with No significant change bilateral infiltrates. There is a small simple left apical pneumothorax. No significant pleural effusion. Bilateral subcutaneous emphysema similar to prior.   XRs on 7AM and 5PM with no obvious increase of ptx  cxr 4/4/21 with Improving bilateral airspace disease noted    cxr 4/8/21 with Small left pneumothorax noted.  thoracic surg f/u   s/p L chest tube replacement 4/18/21 for recurrence of left pneumothorax   cxr 4/20/21 with Unchanged advanced infiltrates and catheter left chest tube noted   CXR 4/11/21 with : No interval change compared to one day prior. No pneumothorax noted.   unable to flush or aspirate tube fully, noted debris in tubing which was milked out.   Once material removed from tubing able to aspirated and flush fully. Also changed dressing on pigtail which appears to be in good position.   Pigtail not kinked or clamped.   Discussed with ICU, recommend repeating CXR to assess if PTX improved after flushing pigtail.   Flush pigtail PRN.  Daily CXR  Monitor O2 status   s/p tracheostomy 4/21/21   stay sutures out 2 weeks from OR date  cxr 4/21/21 with No evidence of active chest disease. Tracheostomy tube in place otherwise no significant change noted   cxr 4/25/21 with A 40% LEFT pneumothorax. LEFT multi-sidehole pigtail catheter overlies LEFT lower hemithorax.. Bilateral multifocal and diffuse ill-defined airspace opacities..  Follow-up AP portable chest radiograph 4/25/2021 AT 8:58 AM: Residual LEFT 30% upper zone pneumothorax. Otherwise no interval change.noted    cxr 4/30/21 Tracheostomy tube again noted. Left chest tube noted with small left apical pneumothorax unchanged. Bilateral airspace opacities overall worsening. Heart size cannot be accurately assessed in this projection noted.  s/p surgical placement of gastrostomy tube 4/23/2021.   cont on tube feeding  id f/u   No need for further antibiotics as patient completed course of Meropenem  leukocytosis resolved  speutum cx with Enterobacter aerogenes (Carbapenem Resistant) noted above  andrea   lispro ss   prognosis poor   s/p 4 units prbc for anemia  f/u h/h    heme onc f/u  retic is elevated.    Haptoglobin normal  had dropped but now stable again  ? daily phlebotomy  no hemolysis, Fe/B12/folate adequate  hemolysis is unlikely even his baseline haptoglobin may be very elevated due to COVID  direct pamella neg noted above   COVID is known to cause cold agglutinin  cont current meds  mgmt as per icu

## 2021-05-03 NOTE — PROGRESS NOTE ADULT - ATTENDING COMMENTS
55 yr old  man , non smoker with  moody 1990s presented 3/14 with x9 days worsening cough, subjective fevers, and SOB, with x2-3 days dysuria and central, non-radiating, constant CP. Admitted to medicine unit  for acute hypoxic respiratory failure secondary to pna from covid-19 infection .     Assessment:  1. Acute hypoxic respiratory failure  2. Covid-19 infection   3. Transaminitis  4. Prediabetes  5. Bilateral pneumothorax  6. Septic shock - resolved  7. Anemia    Plan   -Hgb stable  -Hematology consult appreciated  -Transfuse to hgb > 7  -Iron studies   -S/p tracheostomy placement 4/21   - S/p G-tube 4/23  -Continue tube feedings  -thoracic f/u noted   -Cont. mechanical ventilation   -Chest tube to suction  -Cont. prednisone 40 mg for possible organizing pneumonia as evidence on CT scan  -Fentanyl path, d/c fentanyl effusion  -Ativan for benzo dependence  -add methadone   -monitor biomarkers daily, trending down   -Diarrhea is improved   -PT evaluation  -dvt/gi prophy  -hemodynamic monitoring   -Prognosis is guarded . 55 yr old  man , non smoker with  moody 1990s presented 3/14 with x9 days worsening cough, subjective fevers, and SOB, with x2-3 days dysuria and central, non-radiating, constant CP. Admitted to medicine unit  for acute hypoxic respiratory failure secondary to pna from covid-19 infection .     Assessment:  1. Acute hypoxic respiratory failure  2. Covid-19 infection   3. Transaminitis  4. Prediabetes  5. Bilateral pneumothorax  6. Septic shock - resolved  7. Anemia    Plan   -pat had resp distress.. 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 mechanical ventilation   -Chest tube to suction  -Cont. prednisone 40 mg for possible organizing pneumonia as evidence on CT scan  -Fentanyl path, d/c fentanyl effusion  -Ativan for benzo dependence  -add methadone   -monitor biomarkers daily, trending down   -Diarrhea is improved   -PT evaluation  -dvt/gi prophy  -hemodynamic monitoring   -Prognosis is guarded .

## 2021-05-03 NOTE — PROGRESS NOTE ADULT - ASSESSMENT
Assessment and plan: 56 y/o M with no PMH is admitted to ICU for AHRF 2/2 covid19 pna     1. Acute hypoxic respiratory failure  2. ARDS 2/2 Covid pneumonia  3. Transaminitis  4. Prediabetes  5. Abnormal TSH  6. Pneumothorax    =================== Neuro============================  -Alert and oriented x 3 at baseline   -Intubated and sedated 3/29 on Vent  -Trach 4/22 continues on Vent    -Continue Precedex and off Fentanyl drip, on 1 mcg of precedex  -Was started on Methadone 5 mg BID   - fentanyl patch was d/c but later was restarted   -Started on Ativan PRN, Not helping much   -Off versed   -Off propofol   -CT head unremarkable     ================= Cardiovascular==========================  # Hypotension improving  -Off midodrine 5 mg q8hrs   -Off pressors     # TACHYCARDIA: recurrent episodes   -Lopressor PRN pushes   -HR in 110's  -Continue monitoring     ================- Pulm=================================  #Acute hypoxic respiratory failure: secondary to Covid19 pneumonia  #ARDS  -Was on HFNC then got intubated 3/29  -D-dimer initially elevated on presentation, trending down    -s/p Nimbex and Epifanio at different occasions for vent synchrony  -s/p Pigtail on left chest, removed on 4/15  -Large Pneumothorax was noted on 4/18 on left side, s/p chest tube placement to suction since 4/27, still has pneumothorax-   -Trach 4/22 continues on Vent   -Remdesivir was discontinued due to positive antibodies   - Finished Dexamethasone   - Prednisone taper Finished  - Covid19 PCR negative now, off isolation   - C/w prednisone 40 daily for possible organizing pneumonia   -ABG showed hypercapnia this morning, increased RR to 22      # Bacterial Pneumonia  - Stable infiltrate on CXR ,likely developed Bacterial pneumonia , Finished ABx Zosyn, meropenem, s/p 1 dose of Vancomycin  - MRSA negative from 3/26  - Sputum Cx growing few gram negative rods, CRE  - Secretions from the trach, needs frequent suctions   - Elevated peak and plateau pressures, TV and RR rate decreased, will repeat ABG   - Pulm. Dr. Ryan  - ID Dr Anand       #Pneumothorax and pneumomediastinum:   -X-ray chest: remains with left pneumothorax  -Thoracic surgery following  -s/p Chest tube placed 4/8 pigtail to wall suction discontinued on 4/15  -On 4/18 chest tube was placed back on left lung to treat pneumothorax-  chest tube to suction    -CXR 4/26 still shows left lung pneumothorax - CXR 4/29 persistent pneumothorax     -CXR reading from may1,2 are pending, will repeat CXR today      ==================ID===================================  Likely bacterial pneumonia   -leukocytosis resolved  -No more fever, On Tylenol PRN only   -Finished Meropenem  -plan as above     ================= Nephro================================  -Pitting edema to both arms, ecchymosis on right AC, blisters on left arm around brachial area    -on condom cath   -monitor I/Os , good urine output overall  -s/p Albumin x 2 days on 4/10  -Lasix 40mg BID with Albumin 25% Q6 for 48 hours to help with urine output and fluid status.( 4/15) Albumin finished , was D/jennifer on 4/19, urine output decreasing  - Patient can get Lasix as needed   - Metabolic Alkalosis likely due to compensation, improving post Diamox     =================GI====================================  Transaminitis:   likely secondary to Covid19   - and  on presentation   hepatitis panel -ve  -Improved, now normalized   -continue to monitor LFT    Diet:   S/p NGT (3/29) with tube feed  C/w bowel regimen   G tube 4/23, on tube feeds    Off rectal tube       ================ Heme==================================  Elevated d-dimer: likely secondary to Covid19   -D-dimer 423 on admission  -Last D-dimer 1434  -Off prophylactic Lovenox 40 mg daily for concern of bleed (drop in hb)    Anemia  On 4/26 Hb 8.8 dropped to 6.8, s/p 1 unit of PRBC repeat hb 7.2  On 4/27 Hb 7, s/p 2 units of PRBC hb remains 7  On 4/28 Hb 6.8, s/p 1 unit of PRBC hb 7.5 now  4 PRBC total    CTH and CT abdomen w/o contrast no evidence of bleed    No active signs of bleeding (No hematemesis, melena or hematuria)  F/u hemolysis w/u- negative so far, elevated reticulocytes   Dr Andrade on board   CBC on 5/3 was 7.7  Will Monitor  F/u CBC daily     =================Endocrine===============================  Prediabetes:  -A1c 5.8  -BS controlled  -continue HSS    Abnormal TSH:  -TSH level noted 0.26,   -Repeat TSH 0.37 and Free T4 1.82    ================= Skin/Catheters============================  No rashes. Peripheral IV lines.   Both arms with pitting edema, right AC with ecchymosis and left AC with blisters     - =================Prophylaxis =============================  Off Lovenox for DVT, on SCD   Protonix for GI proph    ==================GOC==================================   FULL CODE

## 2021-05-03 NOTE — PROGRESS NOTE ADULT - SUBJECTIVE AND OBJECTIVE BOX
INTERVAL HPI/OVERNIGHT EVENTS:  patient was found to have large gastric bubble, G tube was connected to wall suction, repeat CXR showed improvement. Suction was stopped this morning. Was started on reglan 10mg q6 for 24 hrs.      PRESSORS: [ ] YES [ x] NO  WHICH:    ANTIBIOTICS:                      Antimicrobial:    Cardiovascular:    Pulmonary:  ALBUTerol    90 MICROgram(s) HFA Inhaler 2 Puff(s) Inhalation every 6 hours PRN    Hematalogic:    Other:  artificial  tears Solution 1 Drop(s) Both EYES every 4 hours PRN  ascorbic acid 1000 milliGRAM(s) Oral daily  BACItracin   Ointment 1 Application(s) Topical two times a day  chlorhexidine 0.12% Liquid 15 milliLiter(s) Oral Mucosa every 12 hours  chlorhexidine 2% Cloths 1 Application(s) Topical <User Schedule>  dexMEDEtomidine Infusion 0.2 MICROgram(s)/kG/Hr IV Continuous <Continuous>  fentaNYL   Patch  50 MICROgram(s)/Hr. 1 Patch Transdermal every 48 hours  insulin lispro (ADMELOG) corrective regimen sliding scale   SubCutaneous every 6 hours  LORazepam   Injectable 4 milliGRAM(s) IV Push every 6 hours PRN  methadone    Tablet 5 milliGRAM(s) Oral two times a day  metoclopramide Injectable 10 milliGRAM(s) IV Push every 6 hours  OLANZapine 5 milliGRAM(s) Oral two times a day  pantoprazole  Injectable 40 milliGRAM(s) IV Push every 12 hours  polyethylene glycol 3350 17 Gram(s) Oral two times a day  predniSONE   Tablet 40 milliGRAM(s) Oral daily  senna 2 Tablet(s) Oral at bedtime  sodium chloride 0.9% lock flush 10 milliLiter(s) IV Push every 1 hour PRN    ALBUTerol    90 MICROgram(s) HFA Inhaler 2 Puff(s) Inhalation every 6 hours PRN  artificial  tears Solution 1 Drop(s) Both EYES every 4 hours PRN  ascorbic acid 1000 milliGRAM(s) Oral daily  BACItracin   Ointment 1 Application(s) Topical two times a day  chlorhexidine 0.12% Liquid 15 milliLiter(s) Oral Mucosa every 12 hours  chlorhexidine 2% Cloths 1 Application(s) Topical <User Schedule>  dexMEDEtomidine Infusion 0.2 MICROgram(s)/kG/Hr IV Continuous <Continuous>  fentaNYL   Patch  50 MICROgram(s)/Hr. 1 Patch Transdermal every 48 hours  insulin lispro (ADMELOG) corrective regimen sliding scale   SubCutaneous every 6 hours  LORazepam   Injectable 4 milliGRAM(s) IV Push every 6 hours PRN  methadone    Tablet 5 milliGRAM(s) Oral two times a day  metoclopramide Injectable 10 milliGRAM(s) IV Push every 6 hours  OLANZapine 5 milliGRAM(s) Oral two times a day  pantoprazole  Injectable 40 milliGRAM(s) IV Push every 12 hours  polyethylene glycol 3350 17 Gram(s) Oral two times a day  predniSONE   Tablet 40 milliGRAM(s) Oral daily  senna 2 Tablet(s) Oral at bedtime  sodium chloride 0.9% lock flush 10 milliLiter(s) IV Push every 1 hour PRN    Drug Dosing Weight  Height (cm): 167.6 (23 Apr 2021 23:15)  Weight (kg): 83.9 (23 Apr 2021 23:15)  BMI (kg/m2): 29.9 (23 Apr 2021 23:15)  BSA (m2): 1.93 (23 Apr 2021 23:15)    CENTRAL LINE: [ ] YES [ ] NO  LOCATION:   DATE INSERTED:  REMOVE: [ ] YES [ ] NO  EXPLAIN:    RIVERA: [ ] YES [ ] NO    DATE INSERTED:  REMOVE:  [ ] YES [ ] NO  EXPLAIN:    A-LINE:  [x ] YES [ ] NO  LOCATION:  [place]  DATE INSERTED:  REMOVE:  [ ] YES [ ] NO  EXPLAIN:    PMH -reviewed admission note, no change since admission    ICU Vital Signs Last 24 Hrs  T(C): 37.2 (03 May 2021 04:00), Max: 37.7 (02 May 2021 09:00)  T(F): 99 (03 May 2021 04:00), Max: 99.8 (02 May 2021 09:00)  HR: 66 (03 May 2021 06:00) (66 - 122)  BP: 102/49 (03 May 2021 06:00) (95/48 - 147/65)  BP(mean): 59 (03 May 2021 06:00) (58 - 95)    RR: 19 (03 May 2021 06:00) (19 - 42)  SpO2: 100% (03 May 2021 06:00) (92% - 100%)      ABG - ( 03 May 2021 03:46 )  pH, Arterial: 7.40  pH, Blood: x     /  pCO2: 71    /  pO2: 264   / HCO3: 44    / Base Excess: 15.5  /  SaO2: 100           05-02 @ 07:01  -  05-03 @ 07:00  --------------------------------------------------------  IN: 1414.5 mL / OUT: 1770 mL / NET: -355.5 mL        Mode: AC/ CMV (Assist Control/ Continuous Mandatory Ventilation)  RR (machine): 22  TV (machine): 400  FiO2: 50  PEEP: 5  ITime: 1  MAP: 9  PIP: 34      PHYSICAL EXAM:  GENERAL: tachypneic trach to vent sat above 97%  EYES: EOMI, PERRLA  NECK: Supple, No JVD; Normal thyroid; Trachea midline: No LAD, trach    NERVOUS SYSTEM: sedated, able to follow commands    CHEST/LUNG: No rales, rhonchi, wheezing, breath sounds present bilaterally  HEART: Regular rate and rhythm; No murmurs, no gallops  ABDOMEN: Soft, Nontender, Nondistended; Bowel sounds present, no pain or masses on palpation, G tube in place- functional, no signs of infection   : condom cath in place  EXTREMITIES:  2+ Peripheral Pulses, No clubbing or cyanosis. Bilateral upper extremity edema with ecchymosis   SKIN: warm, intact, no lesions       LABS:  CBC Full  -  ( 03 May 2021 04:30 )  WBC Count : 7.80 K/uL  RBC Count : 2.46 M/uL  Hemoglobin : 7.7 g/dL  Hematocrit : 25.3 %  Platelet Count - Automated : 297 K/uL  Mean Cell Volume : 102.8 fl  Mean Cell Hemoglobin : 31.3 pg  Mean Cell Hemoglobin Concentration : 30.4 gm/dL  Auto Neutrophil # : x  Auto Lymphocyte # : x  Auto Monocyte # : x  Auto Eosinophil # : x  Auto Basophil # : x  Auto Neutrophil % : x  Auto Lymphocyte % : x  Auto Monocyte % : x  Auto Eosinophil % : x  Auto Basophil % : x    05-03    141  |  102  |  15  ----------------------------<  102<H>  4.2   |  39<H>  |  <0.20<L>    Ca    7.8<L>      03 May 2021 04:30  Phos  2.8     05-03  Mg     2.0     05-03    TPro  5.3<L>  /  Alb  1.5<L>  /  TBili  0.6  /  DBili  x   /  AST  41<H>  /  ALT  28  /  AlkPhos  73  05-03            RADIOLOGY & ADDITIONAL STUDIES REVIEWED:    INTERPRETATION:  XR CHEST    Single AP view    HISTORY:  Tracheostomy follow-up    Comparison: Chest x-ray one day prior    Tracheostomy.    The cardiac silhouette is within normal limits. Patchy bilateral airspace disease. Decreased small left apical pneumothorax with chest tube in place.    IMPRESSION:   Decreased small left apical pneumothorax with chest tube in place.        GOALS OF CARE DISCUSSION WITH PATIENT/FAMILY/PROXY:    CRITICAL CARE TIME SPENT: 35 minutes

## 2021-05-03 NOTE — PROGRESS NOTE ADULT - SUBJECTIVE AND OBJECTIVE BOX
Patient is a 55y old  Male who presents with a chief complaint of SOB (03 May 2021 07:54)  Patient is awake, not alert, laying in bed on ventilator via trach. Has moderate amount of thick yellowish secretions via trach. Tachypneic in moderate distress    INTERVAL HPI/OVERNIGHT EVENTS:      VITAL SIGNS:  T(F): 99 (05-03-21 @ 04:00)  HR: 93 (05-03-21 @ 08:00)  BP: 165/75 (05-03-21 @ 08:00)  RR: 41 (05-03-21 @ 08:00)  SpO2: 93% (05-03-21 @ 08:00)  Wt(kg): --  I&O's Detail    02 May 2021 07:01  -  03 May 2021 07:00  --------------------------------------------------------  IN:    Dexmedetomidine: 745.5 mL    Enteral Tube Flush: 250 mL    Jevity 1.5: 440 mL  Total IN: 1435.5 mL    OUT:    Chest Tube (mL): 20 mL    Incontinent per Condom Catheter (mL): 1750 mL  Total OUT: 1770 mL    Total NET: -334.5 mL      03 May 2021 07:01  -  03 May 2021 10:43  --------------------------------------------------------  IN:    Dexmedetomidine: 42 mL  Total IN: 42 mL    OUT:    Incontinent per Condom Catheter (mL): 250 mL  Total OUT: 250 mL    Total NET: -208 mL        Mode: AC/ CMV (Assist Control/ Continuous Mandatory Ventilation)  RR (machine): 22  TV (machine): 400  FiO2: 60  PEEP: 5  ITime: 0.7  MAP: 15  PC: 30  PIP: 35        REVIEW OF SYSTEMS:    CONSTITUTIONAL:  No fevers, chills, sweats    HEENT:  Eyes:  No diplopia or blurred vision. ENT:  No earache, sore throat or runny nose.    CARDIOVASCULAR:  No pressure, squeezing, tightness, or heaviness about the chest; no palpitations.    RESPIRATORY:  Per HPI    GASTROINTESTINAL:  No abdominal pain, nausea, vomiting or diarrhea.    GENITOURINARY:  No dysuria, frequency or urgency.    NEUROLOGIC:  No paresthesias, fasciculations, seizures or weakness.    PSYCHIATRIC:  No disorder of thought or mood.      PHYSICAL EXAM:    Constitutional: Well developed and nourished  Eyes:Perrla  ENMT: normal  Neck:supple  Respiratory: good air entry  Cardiovascular: S1 S2 regular  Gastrointestinal: Soft, Non tender  Extremities: No edema  Vascular:normal  Neurological:Awake, not alert  Musculoskeletal:Normal      MEDICATIONS  (STANDING):  ascorbic acid 1000 milliGRAM(s) Oral daily  BACItracin   Ointment 1 Application(s) Topical two times a day  chlorhexidine 0.12% Liquid 15 milliLiter(s) Oral Mucosa every 12 hours  chlorhexidine 2% Cloths 1 Application(s) Topical <User Schedule>  dexMEDEtomidine Infusion 0.2 MICROgram(s)/kG/Hr (4.2 mL/Hr) IV Continuous <Continuous>  fentaNYL   Patch  50 MICROgram(s)/Hr. 1 Patch Transdermal every 48 hours  insulin lispro (ADMELOG) corrective regimen sliding scale   SubCutaneous every 6 hours  methadone    Tablet 5 milliGRAM(s) Oral two times a day  metoclopramide Injectable 10 milliGRAM(s) IV Push every 6 hours  OLANZapine 5 milliGRAM(s) Oral two times a day  pantoprazole  Injectable 40 milliGRAM(s) IV Push every 12 hours  polyethylene glycol 3350 17 Gram(s) Oral two times a day  predniSONE   Tablet 40 milliGRAM(s) Oral daily  senna 2 Tablet(s) Oral at bedtime    MEDICATIONS  (PRN):  ALBUTerol    90 MICROgram(s) HFA Inhaler 2 Puff(s) Inhalation every 6 hours PRN Shortness of Breath and/or Wheezing  artificial  tears Solution 1 Drop(s) Both EYES every 4 hours PRN Dry Eyes  LORazepam   Injectable 4 milliGRAM(s) IV Push every 6 hours PRN Agitation  sodium chloride 0.9% lock flush 10 milliLiter(s) IV Push every 1 hour PRN Pre/post blood products, medications, blood draw, and to maintain line patency      Allergies    No Known Allergies    Intolerances        LABS:                        7.7    7.80  )-----------( 297      ( 03 May 2021 04:30 )             25.3     05-03    141  |  102  |  15  ----------------------------<  102<H>  4.2   |  39<H>  |  <0.20<L>    Ca    7.8<L>      03 May 2021 04:30  Phos  2.8     05-03  Mg     2.0     05-03    TPro  5.3<L>  /  Alb  1.5<L>  /  TBili  0.6  /  DBili  x   /  AST  41<H>  /  ALT  28  /  AlkPhos  73  05-03        ABG - ( 03 May 2021 03:46 )  pH, Arterial: 7.40  pH, Blood: x     /  pCO2: 71    /  pO2: 264   / HCO3: 44    / Base Excess: 15.5  /  SaO2: 100                   CAPILLARY BLOOD GLUCOSE      POCT Blood Glucose.: 107 mg/dL (03 May 2021 06:00)  POCT Blood Glucose.: 107 mg/dL (02 May 2021 22:43)  POCT Blood Glucose.: 160 mg/dL (02 May 2021 17:45)  POCT Blood Glucose.: 162 mg/dL (02 May 2021 11:12)        RADIOLOGY & ADDITIONAL TESTS:    CXR:  < from: Xray Chest 1 View- PORTABLE-Urgent (Xray Chest 1 View- PORTABLE-Urgent .) (05.02.21 @ 22:58) >  IMPRESSION: Unchanged advanced infiltrates and small left apical pneumothorax.    < end of copied text >    Ct scan chest:    ekg;    echo:

## 2021-05-04 LAB
ALBUMIN SERPL ELPH-MCNC: 2 G/DL — LOW (ref 3.5–5)
ALP SERPL-CCNC: 89 U/L — SIGNIFICANT CHANGE UP (ref 40–120)
ALT FLD-CCNC: 31 U/L DA — SIGNIFICANT CHANGE UP (ref 10–60)
ANION GAP SERPL CALC-SCNC: 1 MMOL/L — LOW (ref 5–17)
AST SERPL-CCNC: 29 U/L — SIGNIFICANT CHANGE UP (ref 10–40)
BASE EXCESS BLDA CALC-SCNC: 11.1 MMOL/L — HIGH (ref -2–3)
BASE EXCESS BLDA CALC-SCNC: 12.2 MMOL/L — HIGH (ref -2–3)
BASOPHILS # BLD AUTO: 0.07 K/UL — SIGNIFICANT CHANGE UP (ref 0–0.2)
BASOPHILS NFR BLD AUTO: 0.7 % — SIGNIFICANT CHANGE UP (ref 0–2)
BILIRUB SERPL-MCNC: 0.9 MG/DL — SIGNIFICANT CHANGE UP (ref 0.2–1.2)
BLOOD GAS COMMENTS ARTERIAL: SIGNIFICANT CHANGE UP
BLOOD GAS COMMENTS ARTERIAL: SIGNIFICANT CHANGE UP
BUN SERPL-MCNC: 17 MG/DL — SIGNIFICANT CHANGE UP (ref 7–18)
CALCIUM SERPL-MCNC: 8.3 MG/DL — LOW (ref 8.4–10.5)
CHLORIDE SERPL-SCNC: 101 MMOL/L — SIGNIFICANT CHANGE UP (ref 96–108)
CO2 SERPL-SCNC: 38 MMOL/L — HIGH (ref 22–31)
CREAT SERPL-MCNC: 0.31 MG/DL — LOW (ref 0.5–1.3)
D DIMER BLD IA.RAPID-MCNC: 2819 NG/ML DDU — HIGH
EOSINOPHIL # BLD AUTO: 0.2 K/UL — SIGNIFICANT CHANGE UP (ref 0–0.5)
EOSINOPHIL NFR BLD AUTO: 2.1 % — SIGNIFICANT CHANGE UP (ref 0–6)
GLUCOSE BLDC GLUCOMTR-MCNC: 101 MG/DL — HIGH (ref 70–99)
GLUCOSE BLDC GLUCOMTR-MCNC: 102 MG/DL — HIGH (ref 70–99)
GLUCOSE BLDC GLUCOMTR-MCNC: 135 MG/DL — HIGH (ref 70–99)
GLUCOSE BLDC GLUCOMTR-MCNC: 90 MG/DL — SIGNIFICANT CHANGE UP (ref 70–99)
GLUCOSE SERPL-MCNC: 98 MG/DL — SIGNIFICANT CHANGE UP (ref 70–99)
HCO3 BLDA-SCNC: 39 MMOL/L — HIGH (ref 21–28)
HCO3 BLDA-SCNC: 42 MMOL/L — HIGH (ref 21–28)
HCT VFR BLD CALC: 29.8 % — LOW (ref 39–50)
HGB BLD-MCNC: 8.8 G/DL — LOW (ref 13–17)
HOROWITZ INDEX BLDA+IHG-RTO: 60 — SIGNIFICANT CHANGE UP
HOROWITZ INDEX BLDA+IHG-RTO: 60 — SIGNIFICANT CHANGE UP
IMM GRANULOCYTES NFR BLD AUTO: 1.5 % — SIGNIFICANT CHANGE UP (ref 0–1.5)
LYMPHOCYTES # BLD AUTO: 1.03 K/UL — SIGNIFICANT CHANGE UP (ref 1–3.3)
LYMPHOCYTES # BLD AUTO: 10.6 % — LOW (ref 13–44)
MAGNESIUM SERPL-MCNC: 2 MG/DL — SIGNIFICANT CHANGE UP (ref 1.6–2.6)
MCHC RBC-ENTMCNC: 29.5 GM/DL — LOW (ref 32–36)
MCHC RBC-ENTMCNC: 31 PG — SIGNIFICANT CHANGE UP (ref 27–34)
MCV RBC AUTO: 104.9 FL — HIGH (ref 80–100)
MONOCYTES # BLD AUTO: 1.04 K/UL — HIGH (ref 0–0.9)
MONOCYTES NFR BLD AUTO: 10.7 % — SIGNIFICANT CHANGE UP (ref 2–14)
NEUTROPHILS # BLD AUTO: 7.24 K/UL — SIGNIFICANT CHANGE UP (ref 1.8–7.4)
NEUTROPHILS NFR BLD AUTO: 74.4 % — SIGNIFICANT CHANGE UP (ref 43–77)
NRBC # BLD: 0 /100 WBCS — SIGNIFICANT CHANGE UP (ref 0–0)
PCO2 BLDA: 68 MMHG — HIGH (ref 35–48)
PCO2 BLDA: 77 MMHG — CRITICAL HIGH (ref 35–48)
PH BLDA: 7.34 — LOW (ref 7.35–7.45)
PH BLDA: 7.37 — SIGNIFICANT CHANGE UP (ref 7.35–7.45)
PHOSPHATE SERPL-MCNC: 4.7 MG/DL — HIGH (ref 2.5–4.5)
PLATELET # BLD AUTO: 381 K/UL — SIGNIFICANT CHANGE UP (ref 150–400)
PO2 BLDA: 176 MMHG — HIGH (ref 83–108)
PO2 BLDA: 94 MMHG — SIGNIFICANT CHANGE UP (ref 83–108)
POTASSIUM SERPL-MCNC: 3.4 MMOL/L — LOW (ref 3.5–5.3)
POTASSIUM SERPL-SCNC: 3.4 MMOL/L — LOW (ref 3.5–5.3)
PROT SERPL-MCNC: 6.3 G/DL — SIGNIFICANT CHANGE UP (ref 6–8.3)
RBC # BLD: 2.84 M/UL — LOW (ref 4.2–5.8)
RBC # FLD: 16.6 % — HIGH (ref 10.3–14.5)
SAO2 % BLDA: 100 % — SIGNIFICANT CHANGE UP
SAO2 % BLDA: 99 % — SIGNIFICANT CHANGE UP
SODIUM SERPL-SCNC: 140 MMOL/L — SIGNIFICANT CHANGE UP (ref 135–145)
WBC # BLD: 9.73 K/UL — SIGNIFICANT CHANGE UP (ref 3.8–10.5)
WBC # FLD AUTO: 9.73 K/UL — SIGNIFICANT CHANGE UP (ref 3.8–10.5)

## 2021-05-04 PROCEDURE — 71045 X-RAY EXAM CHEST 1 VIEW: CPT | Mod: 26

## 2021-05-04 RX ORDER — POTASSIUM CHLORIDE 20 MEQ
40 PACKET (EA) ORAL EVERY 4 HOURS
Refills: 0 | Status: COMPLETED | OUTPATIENT
Start: 2021-05-04 | End: 2021-05-04

## 2021-05-04 RX ORDER — SODIUM CHLORIDE 9 MG/ML
500 INJECTION INTRAMUSCULAR; INTRAVENOUS; SUBCUTANEOUS ONCE
Refills: 0 | Status: COMPLETED | OUTPATIENT
Start: 2021-05-04 | End: 2021-05-04

## 2021-05-04 RX ORDER — ACETAMINOPHEN 500 MG
650 TABLET ORAL ONCE
Refills: 0 | Status: COMPLETED | OUTPATIENT
Start: 2021-05-04 | End: 2021-05-04

## 2021-05-04 RX ORDER — CHLORHEXIDINE GLUCONATE 213 G/1000ML
15 SOLUTION TOPICAL EVERY 12 HOURS
Refills: 0 | Status: DISCONTINUED | OUTPATIENT
Start: 2021-05-04 | End: 2021-05-21

## 2021-05-04 RX ORDER — HYDROMORPHONE HYDROCHLORIDE 2 MG/ML
1 INJECTION INTRAMUSCULAR; INTRAVENOUS; SUBCUTANEOUS EVERY 8 HOURS
Refills: 0 | Status: DISCONTINUED | OUTPATIENT
Start: 2021-05-04 | End: 2021-05-07

## 2021-05-04 RX ADMIN — SODIUM CHLORIDE 1000 MILLILITER(S): 9 INJECTION INTRAMUSCULAR; INTRAVENOUS; SUBCUTANEOUS at 05:22

## 2021-05-04 RX ADMIN — HYDROMORPHONE HYDROCHLORIDE 1 MILLIGRAM(S): 2 INJECTION INTRAMUSCULAR; INTRAVENOUS; SUBCUTANEOUS at 21:05

## 2021-05-04 RX ADMIN — OLANZAPINE 5 MILLIGRAM(S): 15 TABLET, FILM COATED ORAL at 05:26

## 2021-05-04 RX ADMIN — Medication 1 APPLICATION(S): at 17:39

## 2021-05-04 RX ADMIN — PANTOPRAZOLE SODIUM 40 MILLIGRAM(S): 20 TABLET, DELAYED RELEASE ORAL at 17:40

## 2021-05-04 RX ADMIN — HYDROMORPHONE HYDROCHLORIDE 1 MILLIGRAM(S): 2 INJECTION INTRAMUSCULAR; INTRAVENOUS; SUBCUTANEOUS at 10:39

## 2021-05-04 RX ADMIN — METHADONE HYDROCHLORIDE 5 MILLIGRAM(S): 40 TABLET ORAL at 05:26

## 2021-05-04 RX ADMIN — Medication 650 MILLIGRAM(S): at 17:43

## 2021-05-04 RX ADMIN — PANTOPRAZOLE SODIUM 40 MILLIGRAM(S): 20 TABLET, DELAYED RELEASE ORAL at 05:26

## 2021-05-04 RX ADMIN — FENTANYL CITRATE 1 PATCH: 50 INJECTION INTRAVENOUS at 08:36

## 2021-05-04 RX ADMIN — CHLORHEXIDINE GLUCONATE 1 APPLICATION(S): 213 SOLUTION TOPICAL at 05:27

## 2021-05-04 RX ADMIN — POLYETHYLENE GLYCOL 3350 17 GRAM(S): 17 POWDER, FOR SOLUTION ORAL at 05:26

## 2021-05-04 RX ADMIN — Medication 40 MILLIEQUIVALENT(S): at 05:55

## 2021-05-04 RX ADMIN — Medication 1 DROP(S): at 17:43

## 2021-05-04 RX ADMIN — HYDROMORPHONE HYDROCHLORIDE 1 MILLIGRAM(S): 2 INJECTION INTRAMUSCULAR; INTRAVENOUS; SUBCUTANEOUS at 10:24

## 2021-05-04 RX ADMIN — Medication 1000 MILLIGRAM(S): at 11:50

## 2021-05-04 RX ADMIN — DEXMEDETOMIDINE HYDROCHLORIDE IN 0.9% SODIUM CHLORIDE 4.2 MICROGRAM(S)/KG/HR: 4 INJECTION INTRAVENOUS at 00:17

## 2021-05-04 RX ADMIN — DEXMEDETOMIDINE HYDROCHLORIDE IN 0.9% SODIUM CHLORIDE 4.2 MICROGRAM(S)/KG/HR: 4 INJECTION INTRAVENOUS at 15:42

## 2021-05-04 RX ADMIN — FENTANYL CITRATE 1 PATCH: 50 INJECTION INTRAVENOUS at 19:23

## 2021-05-04 RX ADMIN — Medication 40 MILLIGRAM(S): at 05:26

## 2021-05-04 RX ADMIN — DEXMEDETOMIDINE HYDROCHLORIDE IN 0.9% SODIUM CHLORIDE 4.2 MICROGRAM(S)/KG/HR: 4 INJECTION INTRAVENOUS at 11:49

## 2021-05-04 RX ADMIN — OLANZAPINE 5 MILLIGRAM(S): 15 TABLET, FILM COATED ORAL at 17:43

## 2021-05-04 RX ADMIN — CHLORHEXIDINE GLUCONATE 15 MILLILITER(S): 213 SOLUTION TOPICAL at 17:39

## 2021-05-04 RX ADMIN — Medication 40 MILLIEQUIVALENT(S): at 10:18

## 2021-05-04 RX ADMIN — PROPOFOL 10.1 MICROGRAM(S)/KG/MIN: 10 INJECTION, EMULSION INTRAVENOUS at 00:17

## 2021-05-04 RX ADMIN — Medication 650 MILLIGRAM(S): at 17:39

## 2021-05-04 RX ADMIN — FENTANYL CITRATE 1 PATCH: 50 INJECTION INTRAVENOUS at 05:26

## 2021-05-04 RX ADMIN — Medication 1 APPLICATION(S): at 05:27

## 2021-05-04 RX ADMIN — HYDROMORPHONE HYDROCHLORIDE 1 MILLIGRAM(S): 2 INJECTION INTRAMUSCULAR; INTRAVENOUS; SUBCUTANEOUS at 21:49

## 2021-05-04 RX ADMIN — FENTANYL CITRATE 1 PATCH: 50 INJECTION INTRAVENOUS at 05:27

## 2021-05-04 RX ADMIN — Medication 4 MILLIGRAM(S): at 19:15

## 2021-05-04 RX ADMIN — METHADONE HYDROCHLORIDE 5 MILLIGRAM(S): 40 TABLET ORAL at 17:38

## 2021-05-04 NOTE — PROGRESS NOTE ADULT - ATTENDING COMMENTS
55 yr old  man , non smoker with  moody 1990s presented 3/14 with x9 days worsening cough, subjective fevers, and SOB, with x2-3 days dysuria and central, non-radiating, constant CP. Admitted to medicine unit  for acute hypoxic respiratory failure secondary to pna from covid-19 infection .     Assessment:  1. Acute hypoxic respiratory failure  2. Covid-19 infection   3. Transaminitis  4. Prediabetes  5. Bilateral pneumothorax  6. Septic shock - resolved  7. Anemia    Plan   - 5/3 pat had resp distress.. 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 mechanical ventilation   -Chest tube to suction  -Cont. prednisone 40 mg for possible organizing pneumonia as evidence on CT scan  -Fentanyl path, d/c fentanyl effusion  -Ativan for benzo dependence  -methadone   -PT evaluation  -dvt/gi prophy  -hemodynamic monitoring   -Prognosis is guarded .

## 2021-05-04 NOTE — PHYSICAL THERAPY INITIAL EVALUATION ADULT - IMPAIRMENTS FOUND, PT EVAL
aerobic capacity/endurance/arousal, attention, and cognition/gait, locomotion, and balance/gross motor/muscle strength/poor safety awareness/posture/ROM/tone/ventilation and respiration/gas exchange

## 2021-05-04 NOTE — CHART NOTE - NSCHARTNOTEFT_GEN_A_CORE
Assessment:   Patient is a 55y old  Male who presents with a chief complaint of SOB (04 May 2021 10:45). S/p trach/ PEG. Pt was NPO this AM. TF re-ordered later in AM. Pt seen, not yet restarted. Discussed with PGY 3.         Diet Prescription: Diet, NPO with Tube Feed:   Tube Feeding Modality: Gastrostomy  Jevity 1.5 Leonardo  Total Volume for 24 Hours (mL): 960  Continuous  Starting Tube Feed Rate {mL per Hour}: 10  Increase Tube Feed Rate by (mL): 10     Every hour  Until Goal Tube Feed Rate (mL per Hour): 40  Tube Feed Duration (in Hours): 24  Tube Feed Start Time: 01:00 (21 @ 11:14)      Daily     Daily Weight in k.5 (04 May 2021 07:00)  Weight in k.7 (03 May 2021 07:00)  Weight in k.8 (02 May 2021 07:00)  Weight in k.9 (01 May 2021 07:00)  Weight in k.1 (2021 07:00)  Weight in k (2021 08:00)  Weight in k.9 (2021 08:00)  Weight in k.6 (2021 08:00)  Weight in k (2021 07:30)  Weight in k (2021 07:30)  Weight in k.9 (2021 07:15)  Weight in k.3 (2021 07:30)    % Weight Change: 2+ generalized, 3+B/L arm edema, I>O    Pertinent Medications: MEDICATIONS  (STANDING):  ascorbic acid 1000 milliGRAM(s) Oral daily  BACItracin   Ointment 1 Application(s) Topical two times a day  chlorhexidine 2% Cloths 1 Application(s) Topical <User Schedule>  dexMEDEtomidine Infusion 0.2 MICROgram(s)/kG/Hr (4.2 mL/Hr) IV Continuous <Continuous>  fentaNYL   Patch  50 MICROgram(s)/Hr. 1 Patch Transdermal every 48 hours  HYDROmorphone  Injectable 1 milliGRAM(s) IV Push every 8 hours  insulin lispro (ADMELOG) corrective regimen sliding scale   SubCutaneous every 6 hours  methadone    Tablet 5 milliGRAM(s) Oral two times a day  OLANZapine 5 milliGRAM(s) Oral two times a day  pantoprazole  Injectable 40 milliGRAM(s) IV Push every 12 hours  polyethylene glycol 3350 17 Gram(s) Oral two times a day  predniSONE   Tablet 40 milliGRAM(s) Oral daily  senna 2 Tablet(s) Oral at bedtime    MEDICATIONS  (PRN):  ALBUTerol    90 MICROgram(s) HFA Inhaler 2 Puff(s) Inhalation every 6 hours PRN Shortness of Breath and/or Wheezing  artificial  tears Solution 1 Drop(s) Both EYES every 4 hours PRN Dry Eyes  LORazepam   Injectable 4 milliGRAM(s) IV Push every 6 hours PRN Agitation  sodium chloride 0.9% lock flush 10 milliLiter(s) IV Push every 1 hour PRN Pre/post blood products, medications, blood draw, and to maintain line patency    Pertinent Labs:  Na140 mmol/L Glu 98 mg/dL K+ 3.4 mmol/L<L> Cr  0.31 mg/dL<L> BUN 17 mg/dL  Phos 4.7 mg/dL<H>  Alb 2.0 g/dL<L>  Chol 165 mg/dL LDL --    HDL 26 mg/dL<L> Trig 414 mg/dL<H>     CAPILLARY BLOOD GLUCOSE      POCT Blood Glucose.: 135 mg/dL (04 May 2021 12:08)  POCT Blood Glucose.: 90 mg/dL (04 May 2021 05:48)  POCT Blood Glucose.: 90 mg/dL (03 May 2021 21:52)  POCT Blood Glucose.: 108 mg/dL (03 May 2021 17:55)        Nutrition Diagnosis is [x ] ongoing (PCM/ severe)        Interventions:   Recommend  [ ] Change Diet To:  [x ] Nutrition Supplement: Add 1 Pkt Prosource BID to TF order. MD to monitor. RD available. MD to monitor. RD available.   [ ] Nutrition Support  [ ] Other:     Monitoring and Evaluation:    [ x ] Tolerance to diet prescription [ x ] weights [ x ] labs[ x ] follow up per protocol  [ ] other:

## 2021-05-04 NOTE — PHYSICAL THERAPY INITIAL EVALUATION ADULT - GENERAL OBSERVATIONS, REHAB EVAL
Consult received, chart reviewed. Pt. received in bed, NAD, + Cardiac monitoring, MV to trach 60%fio2 urinary cath, lt chest tube, IVs, SCDS CAIR Boots. RASS -1; CAM ICU (+)  Pt. A&Ox 1. Denies being at home, but also being at hospital. Pt. re-oriented. Primarily communicates w/head motions and hand squeezes

## 2021-05-04 NOTE — PROGRESS NOTE ADULT - SUBJECTIVE AND OBJECTIVE BOX
INTERVAL HPI/OVERNIGHT EVENTS: chest tube was fixed, CXR improved after it was fixed.     PRESSORS: [ ] YES [ x] NO  WHICH:    ANTIBIOTICS:                      Antimicrobial:    Cardiovascular:    Pulmonary:  ALBUTerol    90 MICROgram(s) HFA Inhaler 2 Puff(s) Inhalation every 6 hours PRN    Hematalogic:    Other:  artificial  tears Solution 1 Drop(s) Both EYES every 4 hours PRN  ascorbic acid 1000 milliGRAM(s) Oral daily  BACItracin   Ointment 1 Application(s) Topical two times a day  chlorhexidine 2% Cloths 1 Application(s) Topical <User Schedule>  dexMEDEtomidine Infusion 0.2 MICROgram(s)/kG/Hr IV Continuous <Continuous>  fentaNYL   Patch  50 MICROgram(s)/Hr. 1 Patch Transdermal every 48 hours  HYDROmorphone  Injectable 1 milliGRAM(s) IV Push every 8 hours  insulin lispro (ADMELOG) corrective regimen sliding scale   SubCutaneous every 6 hours  LORazepam   Injectable 4 milliGRAM(s) IV Push every 6 hours PRN  methadone    Tablet 5 milliGRAM(s) Oral two times a day  OLANZapine 5 milliGRAM(s) Oral two times a day  pantoprazole  Injectable 40 milliGRAM(s) IV Push every 12 hours  polyethylene glycol 3350 17 Gram(s) Oral two times a day  potassium chloride   Powder 40 milliEquivalent(s) Oral every 4 hours  predniSONE   Tablet 40 milliGRAM(s) Oral daily  propofol Infusion 20 MICROgram(s)/kG/Min IV Continuous <Continuous>  senna 2 Tablet(s) Oral at bedtime  sodium chloride 0.9% lock flush 10 milliLiter(s) IV Push every 1 hour PRN    ALBUTerol    90 MICROgram(s) HFA Inhaler 2 Puff(s) Inhalation every 6 hours PRN  artificial  tears Solution 1 Drop(s) Both EYES every 4 hours PRN  ascorbic acid 1000 milliGRAM(s) Oral daily  BACItracin   Ointment 1 Application(s) Topical two times a day  chlorhexidine 2% Cloths 1 Application(s) Topical <User Schedule>  dexMEDEtomidine Infusion 0.2 MICROgram(s)/kG/Hr IV Continuous <Continuous>  fentaNYL   Patch  50 MICROgram(s)/Hr. 1 Patch Transdermal every 48 hours  HYDROmorphone  Injectable 1 milliGRAM(s) IV Push every 8 hours  insulin lispro (ADMELOG) corrective regimen sliding scale   SubCutaneous every 6 hours  LORazepam   Injectable 4 milliGRAM(s) IV Push every 6 hours PRN  methadone    Tablet 5 milliGRAM(s) Oral two times a day  OLANZapine 5 milliGRAM(s) Oral two times a day  pantoprazole  Injectable 40 milliGRAM(s) IV Push every 12 hours  polyethylene glycol 3350 17 Gram(s) Oral two times a day  potassium chloride   Powder 40 milliEquivalent(s) Oral every 4 hours  predniSONE   Tablet 40 milliGRAM(s) Oral daily  propofol Infusion 20 MICROgram(s)/kG/Min IV Continuous <Continuous>  senna 2 Tablet(s) Oral at bedtime  sodium chloride 0.9% lock flush 10 milliLiter(s) IV Push every 1 hour PRN    Drug Dosing Weight  Height (cm): 167.6 (23 Apr 2021 23:15)  Weight (kg): 83.9 (23 Apr 2021 23:15)  BMI (kg/m2): 29.9 (23 Apr 2021 23:15)  BSA (m2): 1.93 (23 Apr 2021 23:15)    CENTRAL LINE: [ ] YES [ ] NO  LOCATION:   DATE INSERTED:  REMOVE: [ ] YES [ ] NO  EXPLAIN:    RIVERA: [ ] YES [ ] NO    DATE INSERTED:  REMOVE:  [ ] YES [ ] NO  EXPLAIN:    A-LINE:  [ ] YES [ ] NO  LOCATION:   DATE INSERTED:  REMOVE:  [ ] YES [ ] NO  EXPLAIN:    PMH -reviewed admission note, no change since admission    ICU Vital Signs Last 24 Hrs  T(C): 37.3 (04 May 2021 04:00), Max: 37.3 (03 May 2021 23:00)  T(F): 99.1 (04 May 2021 04:00), Max: 99.1 (03 May 2021 23:00)  HR: 123 (04 May 2021 06:00) (78 - 154)  BP: 105/66 (04 May 2021 06:00) (79/52 - 177/71)  BP(mean): 74 (04 May 2021 06:00) (56 - 96)  RR: 36 (04 May 2021 06:00) (20 - 49)  SpO2: 100% (04 May 2021 06:00) (91% - 100%)      ABG - ( 04 May 2021 04:12 )  pH, Arterial: 7.34  pH, Blood: x     /  pCO2: 77    /  pO2: 94    / HCO3: 42    / Base Excess: 12.2  /  SaO2: 99          05-03 @ 07:01  -  05-04 @ 07:00  --------------------------------------------------------  IN: 1351.6 mL / OUT: 1540 mL / NET: -188.4 mL        Mode: AC/ CMV (Assist Control/ Continuous Mandatory Ventilation)  RR (machine): 22  FiO2: 60  PEEP: 5  ITime: 1  MAP: 11  PC: 25  PIP: 31      PHYSICAL EXAM:  GENERAL: tachypneic trach to vent sat above 97%  EYES: EOMI, PERRLA  NECK: Supple, No JVD; Normal thyroid; Trachea midline: No LAD, trach    NERVOUS SYSTEM: sedated, able to follow commands    CHEST/LUNG: No rales, rhonchi, wheezing, breath sounds present bilaterally  HEART: Regular rate and rhythm; No murmurs, no gallops  ABDOMEN: Soft, Nontender, Nondistended; Bowel sounds present, no pain or masses on palpation, G tube in place- functional, no signs of infection   : condom cath in place  EXTREMITIES:  2+ Peripheral Pulses, No clubbing or cyanosis. Bilateral upper extremity edema with ecchymosis   SKIN: warm, intact, no lesions      LABS:  CBC Full  -  ( 04 May 2021 03:49 )  WBC Count : 9.73 K/uL  RBC Count : 2.84 M/uL  Hemoglobin : 8.8 g/dL  Hematocrit : 29.8 %  Platelet Count - Automated : 381 K/uL  Mean Cell Volume : 104.9 fl  Mean Cell Hemoglobin : 31.0 pg  Mean Cell Hemoglobin Concentration : 29.5 gm/dL  Auto Neutrophil # : 7.24 K/uL  Auto Lymphocyte # : 1.03 K/uL  Auto Monocyte # : 1.04 K/uL  Auto Eosinophil # : 0.20 K/uL  Auto Basophil # : 0.07 K/uL  Auto Neutrophil % : 74.4 %  Auto Lymphocyte % : 10.6 %  Auto Monocyte % : 10.7 %  Auto Eosinophil % : 2.1 %  Auto Basophil % : 0.7 %    05-04    140  |  101  |  17  ----------------------------<  98  3.4<L>   |  38<H>  |  0.31<L>    Ca    8.3<L>      04 May 2021 03:49  Phos  4.7     05-04  Mg     2.0     05-04    TPro  6.3  /  Alb  2.0<L>  /  TBili  0.9  /  DBili  x   /  AST  29  /  ALT  31  /  AlkPhos  89  05-04            RADIOLOGY & ADDITIONAL STUDIES REVIEWED:  < from: Xray Chest 1 View- PORTABLE-Routine (Xray Chest 1 View- PORTABLE-Routine in AM.) (05.03.21 @ 08:44) >  IMPRESSION:  Large left pneumothorax.    The above finding was conveyed to the covering provider who reported that the ICU staff was aware of this finding.        GOALS OF CARE DISCUSSION WITH PATIENT/FAMILY/PROXY:    CRITICAL CARE TIME SPENT: 35 minutes INTERVAL HPI/OVERNIGHT EVENTS: chest tube was fixed, CXR improved after it was fixed. Was hypotensive later during night, was given 1L bolus after which BP improved. Has been tachycardic all night.    PRESSORS: [ ] YES [ x] NO  WHICH:    ANTIBIOTICS:                      Antimicrobial:    Cardiovascular:    Pulmonary:  ALBUTerol    90 MICROgram(s) HFA Inhaler 2 Puff(s) Inhalation every 6 hours PRN    Hematalogic:    Other:  artificial  tears Solution 1 Drop(s) Both EYES every 4 hours PRN  ascorbic acid 1000 milliGRAM(s) Oral daily  BACItracin   Ointment 1 Application(s) Topical two times a day  chlorhexidine 2% Cloths 1 Application(s) Topical <User Schedule>  dexMEDEtomidine Infusion 0.2 MICROgram(s)/kG/Hr IV Continuous <Continuous>  fentaNYL   Patch  50 MICROgram(s)/Hr. 1 Patch Transdermal every 48 hours  HYDROmorphone  Injectable 1 milliGRAM(s) IV Push every 8 hours  insulin lispro (ADMELOG) corrective regimen sliding scale   SubCutaneous every 6 hours  LORazepam   Injectable 4 milliGRAM(s) IV Push every 6 hours PRN  methadone    Tablet 5 milliGRAM(s) Oral two times a day  OLANZapine 5 milliGRAM(s) Oral two times a day  pantoprazole  Injectable 40 milliGRAM(s) IV Push every 12 hours  polyethylene glycol 3350 17 Gram(s) Oral two times a day  potassium chloride   Powder 40 milliEquivalent(s) Oral every 4 hours  predniSONE   Tablet 40 milliGRAM(s) Oral daily  propofol Infusion 20 MICROgram(s)/kG/Min IV Continuous <Continuous>  senna 2 Tablet(s) Oral at bedtime  sodium chloride 0.9% lock flush 10 milliLiter(s) IV Push every 1 hour PRN    ALBUTerol    90 MICROgram(s) HFA Inhaler 2 Puff(s) Inhalation every 6 hours PRN  artificial  tears Solution 1 Drop(s) Both EYES every 4 hours PRN  ascorbic acid 1000 milliGRAM(s) Oral daily  BACItracin   Ointment 1 Application(s) Topical two times a day  chlorhexidine 2% Cloths 1 Application(s) Topical <User Schedule>  dexMEDEtomidine Infusion 0.2 MICROgram(s)/kG/Hr IV Continuous <Continuous>  fentaNYL   Patch  50 MICROgram(s)/Hr. 1 Patch Transdermal every 48 hours  HYDROmorphone  Injectable 1 milliGRAM(s) IV Push every 8 hours  insulin lispro (ADMELOG) corrective regimen sliding scale   SubCutaneous every 6 hours  LORazepam   Injectable 4 milliGRAM(s) IV Push every 6 hours PRN  methadone    Tablet 5 milliGRAM(s) Oral two times a day  OLANZapine 5 milliGRAM(s) Oral two times a day  pantoprazole  Injectable 40 milliGRAM(s) IV Push every 12 hours  polyethylene glycol 3350 17 Gram(s) Oral two times a day  potassium chloride   Powder 40 milliEquivalent(s) Oral every 4 hours  predniSONE   Tablet 40 milliGRAM(s) Oral daily  propofol Infusion 20 MICROgram(s)/kG/Min IV Continuous <Continuous>  senna 2 Tablet(s) Oral at bedtime  sodium chloride 0.9% lock flush 10 milliLiter(s) IV Push every 1 hour PRN    Drug Dosing Weight  Height (cm): 167.6 (23 Apr 2021 23:15)  Weight (kg): 83.9 (23 Apr 2021 23:15)  BMI (kg/m2): 29.9 (23 Apr 2021 23:15)  BSA (m2): 1.93 (23 Apr 2021 23:15)    CENTRAL LINE: [ ] YES [ ] NO  LOCATION:   DATE INSERTED:  REMOVE: [ ] YES [ ] NO  EXPLAIN:    RIVERA: [ ] YES [ ] NO    DATE INSERTED:  REMOVE:  [ ] YES [ ] NO  EXPLAIN:    A-LINE:  [ ] YES [ ] NO  LOCATION:   DATE INSERTED:  REMOVE:  [ ] YES [ ] NO  EXPLAIN:    PMH -reviewed admission note, no change since admission    ICU Vital Signs Last 24 Hrs  T(C): 37.3 (04 May 2021 04:00), Max: 37.3 (03 May 2021 23:00)  T(F): 99.1 (04 May 2021 04:00), Max: 99.1 (03 May 2021 23:00)  HR: 123 (04 May 2021 06:00) (78 - 154)  BP: 105/66 (04 May 2021 06:00) (79/52 - 177/71)  BP(mean): 74 (04 May 2021 06:00) (56 - 96)  RR: 36 (04 May 2021 06:00) (20 - 49)  SpO2: 100% (04 May 2021 06:00) (91% - 100%)      ABG - ( 04 May 2021 04:12 )  pH, Arterial: 7.34  pH, Blood: x     /  pCO2: 77    /  pO2: 94    / HCO3: 42    / Base Excess: 12.2  /  SaO2: 99          05-03 @ 07:01  -  05-04 @ 07:00  --------------------------------------------------------  IN: 1351.6 mL / OUT: 1540 mL / NET: -188.4 mL        Mode: AC/ CMV (Assist Control/ Continuous Mandatory Ventilation)  RR (machine): 22  FiO2: 60  PEEP: 5  ITime: 1  MAP: 11  PC: 25  PIP: 31      PHYSICAL EXAM:  GENERAL: tachypneic trach to vent sat above 97%  EYES: EOMI, PERRLA  NECK: Supple, No JVD; Normal thyroid; Trachea midline: No LAD, trach    NERVOUS SYSTEM: sedated, able to follow commands    CHEST/LUNG: No rales, rhonchi, wheezing, breath sounds present bilaterally  HEART: Regular rate and rhythm; No murmurs, no gallops  ABDOMEN: Soft, Nontender, Nondistended; Bowel sounds present, no pain or masses on palpation, G tube in place- functional, no signs of infection   : condom cath in place  EXTREMITIES:  2+ Peripheral Pulses, No clubbing or cyanosis. Bilateral upper extremity edema with ecchymosis   SKIN: warm, intact, no lesions      LABS:  CBC Full  -  ( 04 May 2021 03:49 )  WBC Count : 9.73 K/uL  RBC Count : 2.84 M/uL  Hemoglobin : 8.8 g/dL  Hematocrit : 29.8 %  Platelet Count - Automated : 381 K/uL  Mean Cell Volume : 104.9 fl  Mean Cell Hemoglobin : 31.0 pg  Mean Cell Hemoglobin Concentration : 29.5 gm/dL  Auto Neutrophil # : 7.24 K/uL  Auto Lymphocyte # : 1.03 K/uL  Auto Monocyte # : 1.04 K/uL  Auto Eosinophil # : 0.20 K/uL  Auto Basophil # : 0.07 K/uL  Auto Neutrophil % : 74.4 %  Auto Lymphocyte % : 10.6 %  Auto Monocyte % : 10.7 %  Auto Eosinophil % : 2.1 %  Auto Basophil % : 0.7 %    05-04    140  |  101  |  17  ----------------------------<  98  3.4<L>   |  38<H>  |  0.31<L>    Ca    8.3<L>      04 May 2021 03:49  Phos  4.7     05-04  Mg     2.0     05-04    TPro  6.3  /  Alb  2.0<L>  /  TBili  0.9  /  DBili  x   /  AST  29  /  ALT  31  /  AlkPhos  89  05-04            RADIOLOGY & ADDITIONAL STUDIES REVIEWED:  < from: Xray Chest 1 View- PORTABLE-Routine (Xray Chest 1 View- PORTABLE-Routine in AM.) (05.03.21 @ 08:44) >  IMPRESSION:  Large left pneumothorax.    The above finding was conveyed to the covering provider who reported that the ICU staff was aware of this finding.        GOALS OF CARE DISCUSSION WITH PATIENT/FAMILY/PROXY:    CRITICAL CARE TIME SPENT: 35 minutes

## 2021-05-04 NOTE — PROGRESS NOTE ADULT - SUBJECTIVE AND OBJECTIVE BOX
Patient is a 55y old  Male who presents with a chief complaint of SOB (03 May 2021 10:40)    pt seen in icu [ x ], reg med floor [   ], bed [ x ], chair at bedside [   ], awake and responsive [ x ], mildly sedated, [  ],    nad [x  ]      Allergies    No Known Allergies        Vitals    T(F): 99.1 (05-04-21 @ 04:00), Max: 99.1 (05-03-21 @ 23:00)  HR: 123 (05-04-21 @ 06:00) (70 - 154)  BP: 105/66 (05-04-21 @ 06:00) (79/52 - 177/71)  RR: 36 (05-04-21 @ 06:00) (20 - 49)  SpO2: 100% (05-04-21 @ 06:00) (91% - 100%)  Wt(kg): --  CAPILLARY BLOOD GLUCOSE      POCT Blood Glucose.: 90 mg/dL (04 May 2021 05:48)      Labs                          8.8    9.73  )-----------( 381      ( 04 May 2021 03:49 )             29.8       05-04    140  |  101  |  17  ----------------------------<  98  3.4<L>   |  38<H>  |  0.31<L>    Ca    8.3<L>      04 May 2021 03:49  Phos  4.7     05-04  Mg     2.0     05-04    TPro  6.3  /  Alb  2.0<L>  /  TBili  0.9  /  DBili  x   /  AST  29  /  ALT  31  /  AlkPhos  89  05-04            .Sputum Sputum  04-16 @ 04:42   Moderate Enterobacter aerogenes (Carbapenem Resistant)  Normal Respiratory Monserrat present  --  Enterobacter aerogenes (Carbapenem Resistant)      .Urine Clean Catch (Midstream)  03-15 @ 00:52   No growth  --  --          Radiology Results      Meds    MEDICATIONS  (STANDING):  ascorbic acid 1000 milliGRAM(s) Oral daily  BACItracin   Ointment 1 Application(s) Topical two times a day  chlorhexidine 2% Cloths 1 Application(s) Topical <User Schedule>  dexMEDEtomidine Infusion 0.2 MICROgram(s)/kG/Hr (4.2 mL/Hr) IV Continuous <Continuous>  fentaNYL   Patch  50 MICROgram(s)/Hr. 1 Patch Transdermal every 48 hours  HYDROmorphone  Injectable 1 milliGRAM(s) IV Push every 8 hours  insulin lispro (ADMELOG) corrective regimen sliding scale   SubCutaneous every 6 hours  methadone    Tablet 5 milliGRAM(s) Oral two times a day  OLANZapine 5 milliGRAM(s) Oral two times a day  pantoprazole  Injectable 40 milliGRAM(s) IV Push every 12 hours  polyethylene glycol 3350 17 Gram(s) Oral two times a day  potassium chloride   Powder 40 milliEquivalent(s) Oral every 4 hours  predniSONE   Tablet 40 milliGRAM(s) Oral daily  propofol Infusion 20 MICROgram(s)/kG/Min (10.1 mL/Hr) IV Continuous <Continuous>  senna 2 Tablet(s) Oral at bedtime      MEDICATIONS  (PRN):  ALBUTerol    90 MICROgram(s) HFA Inhaler 2 Puff(s) Inhalation every 6 hours PRN Shortness of Breath and/or Wheezing  artificial  tears Solution 1 Drop(s) Both EYES every 4 hours PRN Dry Eyes  LORazepam   Injectable 4 milliGRAM(s) IV Push every 6 hours PRN Agitation  sodium chloride 0.9% lock flush 10 milliLiter(s) IV Push every 1 hour PRN Pre/post blood products, medications, blood draw, and to maintain line patency      Physical Exam    Neuro :  no focal deficits  Respiratory: CTA B/L  CV: RRR, S1S2, no murmurs,   Abdominal: Soft, NT, ND +BS, g- tube in place, dressing cdi  Extremities: b/l ue edema, + peripheral pulses      ASSESSMENT    Hypoxemia 2nd to covid pna   transaminitis  prediabetes  h/o appendectomy  cholecystectomy        PLAN    contact and airborne isolation  d/c remdesevir given covid ab positive noted   completed dexamethasone   started pulse steroids for 3 days - 250mg solumedrol bid now tapered off 4/14/21   cont prednisone 40mg daily  cont asa, vit c,    cont albuterol inhaler   pulm f/u  procalcitonin, D-dimer, crp, ldh, ferritin, lactate noted ,    tmx 99.8   cont tylenol prn,   cont robitussin prn   pt on precedex drip   pt on fentanyl patch  off pressors   s/p intubation 3/29/21   s/p tracheostomy 4/21/21  O2 sat (92% - 100%) mech vent 50%  O2 via mech vent and taper fio2 as tolerated   vent mgmt as per icu  xray 3/19/21 with pneumomediastinum  rept cxr with New trace right apical pneumothorax. New mild left apical pneumothorax. Grossly stable small pneumomediastinum.  Soft tissue emphysema at the neck bases bilaterally. Grossly stable bilateral pulmonary infiltrates noted.   cxr 2/24 with No evidence of pneumothorax can be appreciated on the available image. This may be related to patient positioning. Evidence of pneumomediastinum and subcutaneous emphysema in the lower neck is again noted. There are patchy bibasilar infiltrates and elevated right hemidiaphragm noted.   cxr 3/29/21 with No significant change bilateral infiltrates. There is a small simple left apical pneumothorax. No significant pleural effusion. Bilateral subcutaneous emphysema similar to prior.   XRs on 7AM and 5PM with no obvious increase of ptx  cxr 4/4/21 with Improving bilateral airspace disease noted    cxr 4/8/21 with Small left pneumothorax noted.  thoracic surg f/u   s/p L chest tube replacement 4/18/21 for recurrence of left pneumothorax   cxr 4/20/21 with Unchanged advanced infiltrates and catheter left chest tube noted   CXR 4/11/21 with : No interval change compared to one day prior. No pneumothorax noted.   unable to flush or aspirate tube fully, noted debris in tubing which was milked out.   Once material removed from tubing able to aspirated and flush fully. Also changed dressing on pigtail which appears to be in good position.   Pigtail not kinked or clamped.   Discussed with ICU, recommend repeating CXR to assess if PTX improved after flushing pigtail.   Flush pigtail PRN.  Daily CXR  Monitor O2 status   s/p tracheostomy 4/21/21   stay sutures out 2 weeks from OR date  cxr 4/21/21 with No evidence of active chest disease. Tracheostomy tube in place otherwise no significant change noted   cxr 4/25/21 with A 40% LEFT pneumothorax. LEFT multi-sidehole pigtail catheter overlies LEFT lower hemithorax.. Bilateral multifocal and diffuse ill-defined airspace opacities..  Follow-up AP portable chest radiograph 4/25/2021 AT 8:58 AM: Residual LEFT 30% upper zone pneumothorax. Otherwise no interval change.noted    cxr 4/30/21 Tracheostomy tube again noted. Left chest tube noted with small left apical pneumothorax unchanged. Bilateral airspace opacities overall worsening. Heart size cannot be accurately assessed in this projection noted.  s/p surgical placement of gastrostomy tube 4/23/2021.   cont on tube feeding  id f/u   No need for further antibiotics as patient completed course of Meropenem  leukocytosis resolved  speutum cx with Enterobacter aerogenes (Carbapenem Resistant) noted above  andrea   lispro ss   prognosis poor   s/p 4 units prbc for anemia  f/u h/h    heme onc f/u  retic is elevated.    Haptoglobin normal  had dropped but now stable again  ? daily phlebotomy  no hemolysis, Fe/B12/folate adequate  hemolysis is unlikely even his baseline haptoglobin may be very elevated due to COVID  direct pamella neg noted above   COVID is known to cause cold agglutinin  cont current meds  mgmt as per icu        Patient is a 55y old  Male who presents with a chief complaint of SOB (03 May 2021 10:40)    pt seen in icu [ x ], reg med floor [   ], bed [ x ], chair at bedside [   ], awake and responsive [ x ], mildly sedated, [  ],    nad [x  ]      Allergies    No Known Allergies        Vitals    T(F): 99.1 (05-04-21 @ 04:00), Max: 99.1 (05-03-21 @ 23:00)  HR: 123 (05-04-21 @ 06:00) (70 - 154)  BP: 105/66 (05-04-21 @ 06:00) (79/52 - 177/71)  RR: 36 (05-04-21 @ 06:00) (20 - 49)  SpO2: 100% (05-04-21 @ 06:00) (91% - 100%)  Wt(kg): --  CAPILLARY BLOOD GLUCOSE      POCT Blood Glucose.: 90 mg/dL (04 May 2021 05:48)      Labs                          8.8    9.73  )-----------( 381      ( 04 May 2021 03:49 )             29.8       05-04    140  |  101  |  17  ----------------------------<  98  3.4<L>   |  38<H>  |  0.31<L>    Ca    8.3<L>      04 May 2021 03:49  Phos  4.7     05-04  Mg     2.0     05-04    TPro  6.3  /  Alb  2.0<L>  /  TBili  0.9  /  DBili  x   /  AST  29  /  ALT  31  /  AlkPhos  89  05-04            .Sputum Sputum  04-16 @ 04:42   Moderate Enterobacter aerogenes (Carbapenem Resistant)  Normal Respiratory Monserrat present  --  Enterobacter aerogenes (Carbapenem Resistant)      .Urine Clean Catch (Midstream)  03-15 @ 00:52   No growth  --  --          Radiology Results    < from: Xray Chest 1 View- PORTABLE-Routine (Xray Chest 1 View- PORTABLE-Routine in AM.) (05.03.21 @ 08:44) >  IMPRESSION:  Large left pneumothorax.    The above finding was conveyed to the covering provider who reported that the ICU staff was aware of this finding.      < end of copied text >      Meds    MEDICATIONS  (STANDING):  ascorbic acid 1000 milliGRAM(s) Oral daily  BACItracin   Ointment 1 Application(s) Topical two times a day  chlorhexidine 2% Cloths 1 Application(s) Topical <User Schedule>  dexMEDEtomidine Infusion 0.2 MICROgram(s)/kG/Hr (4.2 mL/Hr) IV Continuous <Continuous>  fentaNYL   Patch  50 MICROgram(s)/Hr. 1 Patch Transdermal every 48 hours  HYDROmorphone  Injectable 1 milliGRAM(s) IV Push every 8 hours  insulin lispro (ADMELOG) corrective regimen sliding scale   SubCutaneous every 6 hours  methadone    Tablet 5 milliGRAM(s) Oral two times a day  OLANZapine 5 milliGRAM(s) Oral two times a day  pantoprazole  Injectable 40 milliGRAM(s) IV Push every 12 hours  polyethylene glycol 3350 17 Gram(s) Oral two times a day  potassium chloride   Powder 40 milliEquivalent(s) Oral every 4 hours  predniSONE   Tablet 40 milliGRAM(s) Oral daily  propofol Infusion 20 MICROgram(s)/kG/Min (10.1 mL/Hr) IV Continuous <Continuous>  senna 2 Tablet(s) Oral at bedtime      MEDICATIONS  (PRN):  ALBUTerol    90 MICROgram(s) HFA Inhaler 2 Puff(s) Inhalation every 6 hours PRN Shortness of Breath and/or Wheezing  artificial  tears Solution 1 Drop(s) Both EYES every 4 hours PRN Dry Eyes  LORazepam   Injectable 4 milliGRAM(s) IV Push every 6 hours PRN Agitation  sodium chloride 0.9% lock flush 10 milliLiter(s) IV Push every 1 hour PRN Pre/post blood products, medications, blood draw, and to maintain line patency      Physical Exam    Neuro :  no focal deficits  Respiratory: CTA B/L  CV: RRR, S1S2, no murmurs,   Abdominal: Soft, NT, ND +BS, g- tube in place, dressing cdi  Extremities: b/l ue edema, + peripheral pulses      ASSESSMENT    Hypoxemia 2nd to covid pna   transaminitis  prediabetes  h/o appendectomy  cholecystectomy        PLAN    contact and airborne isolation  d/c remdesevir given covid ab positive noted   completed dexamethasone   started pulse steroids for 3 days - 250mg solumedrol bid now tapered off 4/14/21   cont prednisone 40mg daily  cont asa, vit c,    cont albuterol inhaler   pulm f/u  procalcitonin, D-dimer, crp, ldh, ferritin, lactate noted ,    tmx 99.1   cont tylenol prn,   cont robitussin prn   pt on precedex drip and propofol   pt on fentanyl patch  off pressors   s/p intubation 3/29/21   s/p tracheostomy 4/21/21  O2 sat (91% - 100%) mech vent 60%  O2 via mech vent and taper fio2 as tolerated   vent mgmt as per icu  xray 3/19/21 with pneumomediastinum  rept cxr with New trace right apical pneumothorax. New mild left apical pneumothorax. Grossly stable small pneumomediastinum.  Soft tissue emphysema at the neck bases bilaterally. Grossly stable bilateral pulmonary infiltrates noted.   cxr 2/24 with No evidence of pneumothorax can be appreciated on the available image. This may be related to patient positioning. Evidence of pneumomediastinum and subcutaneous emphysema in the lower neck is again noted. There are patchy bibasilar infiltrates and elevated right hemidiaphragm noted.   cxr 3/29/21 with No significant change bilateral infiltrates. There is a small simple left apical pneumothorax. No significant pleural effusion. Bilateral subcutaneous emphysema similar to prior.   XRs on 7AM and 5PM with no obvious increase of ptx  cxr 4/4/21 with Improving bilateral airspace disease noted    cxr 4/8/21 with Small left pneumothorax noted.  thoracic surg f/u   s/p L chest tube replacement 4/18/21 for recurrence of left pneumothorax   cxr 4/20/21 with Unchanged advanced infiltrates and catheter left chest tube noted   CXR 4/11/21 with : No interval change compared to one day prior. No pneumothorax noted.   unable to flush or aspirate tube fully, noted debris in tubing which was milked out.   Once material removed from tubing able to aspirated and flush fully. Also changed dressing on pigtail which appears to be in good position.   Pigtail not kinked or clamped.   Discussed with ICU, recommend repeating CXR to assess if PTX improved after flushing pigtail.   Flush pigtail PRN.  Daily CXR  Monitor O2 status   s/p tracheostomy 4/21/21   stay sutures out 2 weeks from OR date  cxr 4/21/21 with No evidence of active chest disease. Tracheostomy tube in place otherwise no significant change noted   cxr 4/25/21 with A 40% LEFT pneumothorax. LEFT multi-sidehole pigtail catheter overlies LEFT lower hemithorax.. Bilateral multifocal and diffuse ill-defined airspace opacities..  Follow-up AP portable chest radiograph 4/25/2021 AT 8:58 AM: Residual LEFT 30% upper zone pneumothorax. Otherwise no interval change.noted    cxr 4/30/21 Tracheostomy tube again noted. Left chest tube noted with small left apical pneumothorax unchanged. Bilateral airspace opacities overall worsening. Heart size cannot be accurately assessed in this projection noted.   cxr 5/3/21 with Large left pneumothorax noted above.  s/p surgical placement of gastrostomy tube 4/23/2021.   cont on tube feeding  id f/u   No need for further antibiotics as patient completed course of Meropenem  leukocytosis resolved  speutum cx with Enterobacter aerogenes (Carbapenem Resistant) noted above  andrea   lispro ss   prognosis poor   s/p 4 units prbc for anemia  f/u h/h    heme onc f/u  retic is elevated.    Haptoglobin normal  had dropped but now stable again  ? daily phlebotomy  no hemolysis, Fe/B12/folate adequate  hemolysis is unlikely even his baseline haptoglobin may be very elevated due to COVID  direct pamella neg noted above   COVID is known to cause cold agglutinin  cont current meds  mgmt as per icu

## 2021-05-04 NOTE — PROGRESS NOTE ADULT - ASSESSMENT
Assessment and plan: 54 y/o M with no PMH is admitted to ICU for AHRF 2/2 covid19 pna     1. Acute hypoxic respiratory failure  2. ARDS 2/2 Covid pneumonia  3. Transaminitis  4. Prediabetes  5. Abnormal TSH  6. Pneumothorax    =================== Neuro============================  -Alert and oriented x 3 at baseline   -Intubated and sedated 3/29 on Vent  -Trach 4/22 continues on Vent    -Continue Precedex and off Fentanyl drip, on 1 mcg of precedex  -Was started on Methadone 5 mg BID   - fentanyl patch was d/c but later was restarted   -Started on Ativan PRN, Not helping much   -Off versed   -Off propofol   -CT head unremarkable     ================= Cardiovascular==========================  # Hypotension improving  -Off midodrine 5 mg q8hrs   -Off pressors     # TACHYCARDIA: recurrent episodes   -Lopressor PRN pushes   -HR in 110's  -Continue monitoring     ================- Pulm=================================  #Acute hypoxic respiratory failure: secondary to Covid19 pneumonia  #ARDS  -Was on HFNC then got intubated 3/29  -D-dimer initially elevated on presentation, trending down    -s/p Nimbex and Epifanio at different occasions for vent synchrony  -s/p Pigtail on left chest, removed on 4/15  -Large Pneumothorax was noted on 4/18 on left side, s/p chest tube placement to suction since 4/27, still has pneumothorax-   -Trach 4/22 continues on Vent   -Remdesivir was discontinued due to positive antibodies   - Finished Dexamethasone   - Prednisone taper Finished  - Covid19 PCR negative now, off isolation   - C/w prednisone 40 daily for possible organizing pneumonia   -ABG showed hypercapnia this morning, increased RR to 22      # Bacterial Pneumonia  - Stable infiltrate on CXR ,likely developed Bacterial pneumonia , Finished ABx Zosyn, meropenem, s/p 1 dose of Vancomycin  - MRSA negative from 3/26  - Sputum Cx growing few gram negative rods, CRE  - Secretions from the trach, needs frequent suctions   - Elevated peak and plateau pressures, TV and RR rate decreased, will repeat ABG   - Pulm. Dr. Ryan  - ID Dr Anand       #Pneumothorax and pneumomediastinum:   -X-ray chest: remains with left pneumothorax  -Thoracic surgery following  -s/p Chest tube placed 4/8 pigtail to wall suction discontinued on 4/15  -On 4/18 chest tube was placed back on left lung to treat pneumothorax-  chest tube to suction    -CXR 4/26 still shows left lung pneumothorax - CXR 4/29 persistent pneumothorax     -CXR reading from may1,2 are pending, will repeat CXR today      ==================ID===================================  Likely bacterial pneumonia   -leukocytosis resolved  -No more fever, On Tylenol PRN only   -Finished Meropenem  -plan as above     ================= Nephro================================  -Pitting edema to both arms, ecchymosis on right AC, blisters on left arm around brachial area    -on condom cath   -monitor I/Os , good urine output overall  -s/p Albumin x 2 days on 4/10  -Lasix 40mg BID with Albumin 25% Q6 for 48 hours to help with urine output and fluid status.( 4/15) Albumin finished , was D/jennifer on 4/19, urine output decreasing  - Patient can get Lasix as needed   - Metabolic Alkalosis likely due to compensation, improving post Diamox     =================GI====================================  Transaminitis:   likely secondary to Covid19   - and  on presentation   hepatitis panel -ve  -Improved, now normalized   -continue to monitor LFT    Diet:   S/p NGT (3/29) with tube feed  C/w bowel regimen   G tube 4/23, on tube feeds    Off rectal tube       ================ Heme==================================  Elevated d-dimer: likely secondary to Covid19   -D-dimer 423 on admission  -Last D-dimer 1434  -Off prophylactic Lovenox 40 mg daily for concern of bleed (drop in hb)    Anemia  On 4/26 Hb 8.8 dropped to 6.8, s/p 1 unit of PRBC repeat hb 7.2  On 4/27 Hb 7, s/p 2 units of PRBC hb remains 7  On 4/28 Hb 6.8, s/p 1 unit of PRBC hb 7.5 now  4 PRBC total    CTH and CT abdomen w/o contrast no evidence of bleed    No active signs of bleeding (No hematemesis, melena or hematuria)  F/u hemolysis w/u- negative so far, elevated reticulocytes   Dr Andrade on board   CBC on 5/3 was 7.7  Will Monitor  F/u CBC daily     =================Endocrine===============================  Prediabetes:  -A1c 5.8  -BS controlled  -continue HSS    Abnormal TSH:  -TSH level noted 0.26,   -Repeat TSH 0.37 and Free T4 1.82    ================= Skin/Catheters============================  No rashes. Peripheral IV lines.   Both arms with pitting edema, right AC with ecchymosis and left AC with blisters     - =================Prophylaxis =============================  Off Lovenox for DVT, on SCD   Protonix for GI proph    ==================GOC==================================   FULL CODE Assessment and plan: 56 y/o M with no PMH is admitted to ICU for AHRF 2/2 covid19 pna     1. Acute hypoxic respiratory failure  2. ARDS 2/2 Covid pneumonia  3. Transaminitis  4. Prediabetes  5. Abnormal TSH  6. Pneumothorax    =================== Neuro============================  -Alert and oriented x 3 at baseline   -Intubated and sedated 3/29 on Vent  -Trach 4/22 continues on Vent    -Was started on precedex last night  -Continue Precedex and off Fentanyl drip, on 1 mcg of precedex  -Was started on Methadone 5 mg BID   - fentanyl patch was d/c but later was restarted   -Started on Ativan PRN, Not helping much   -Off versed   -d/cd propofol  -CT head unremarkable     ================= Cardiovascular==========================  # Hypotension improving  - was hypotensive overnight  Was given fluid bolus after which he improved    # TACHYCARDIA: recurrent episodes   -Lopressor PRN pushes   -HR in 110's  -Continue monitoring     ================- Pulm=================================  #Acute hypoxic respiratory failure: secondary to Covid19 pneumonia  #ARDS  -Was on HFNC then got intubated 3/29  -D-dimer initially elevated on presentation, trending down    -s/p Nimbex and Epifanio at different occasions for vent synchrony  -s/p Pigtail on left chest, removed on 4/15  -Large Pneumothorax was noted on 4/18 on left side, s/p chest tube placement to suction since 4/27, still has pneumothorax-   -Trach 4/22 continues on Vent   -Remdesivir was discontinued due to positive antibodies   - Finished Dexamethasone   - Prednisone taper Finished  - Covid19 PCR negative now, off isolation   - C/w prednisone 40 daily for possible organizing pneumonia   -ABG showed hypercapnia this morning, changed to volume control setting      # Bacterial Pneumonia  - Stable infiltrate on CXR ,likely developed Bacterial pneumonia , Finished ABx Zosyn, meropenem, s/p 1 dose of Vancomycin  - MRSA negative from 3/26  - Sputum Cx growing few gram negative rods, CRE  - Secretions from the trach, needs frequent suctions   - Elevated peak and plateau pressures, TV and RR rate decreased, will repeat ABG   - Pulm. Dr. Ryan  - ID Dr Anand       #Pneumothorax and pneumomediastinum:   -X-ray chest: remains with left pneumothorax  -Thoracic surgery following  -s/p Chest tube placed 4/8 pigtail to wall suction discontinued on 4/15  -On 4/18 chest tube was placed back on left lung to treat pneumothorax-  chest tube to suction    -CXR 4/26 still shows left lung pneumothorax - CXR 4/29 persistent pneumothorax     -CXR reading from may1,2 are pending, will repeat CXR today      ==================ID===================================  Likely bacterial pneumonia   -leukocytosis resolved  -No more fever, On Tylenol PRN only   -Finished Meropenem  -plan as above     ================= Nephro================================  -Pitting edema to both arms, ecchymosis on right AC, blisters on left arm around brachial area    -on condom cath   -monitor I/Os , good urine output overall  -s/p Albumin x 2 days on 4/10  -Lasix 40mg BID with Albumin 25% Q6 for 48 hours to help with urine output and fluid status.( 4/15) Albumin finished , was D/jennifer on 4/19, urine output decreasing  - Patient can get Lasix as needed   - Metabolic Alkalosis likely due to compensation, improving post Diamox     =================GI====================================  Transaminitis:   likely secondary to Covid19   - and  on presentation   hepatitis panel -ve  -Improved, now normalized   -continue to monitor LFT    Diet:   S/p NGT (3/29) with tube feed  C/w bowel regimen   G tube 4/23, on tube feeds    Off rectal tube       ================ Heme==================================  Elevated d-dimer: likely secondary to Covid19   -D-dimer 423 on admission  -Last D-dimer 1434  -Off prophylactic Lovenox 40 mg daily for concern of bleed (drop in hb)    Anemia  On 4/26 Hb 8.8 dropped to 6.8, s/p 1 unit of PRBC repeat hb 7.2  On 4/27 Hb 7, s/p 2 units of PRBC hb remains 7  On 4/28 Hb 6.8, s/p 1 unit of PRBC hb 7.5 now  4 PRBC total    CTH and CT abdomen w/o contrast no evidence of bleed    No active signs of bleeding (No hematemesis, melena or hematuria)  F/u hemolysis w/u- negative so far, elevated reticulocytes   Dr Andrade on board   CBC on 5/3 was 7.7  Will Monitor  F/u CBC daily     =================Endocrine===============================  Prediabetes:  -A1c 5.8  -BS controlled  -continue HSS    Abnormal TSH:  -TSH level noted 0.26,   -Repeat TSH 0.37 and Free T4 1.82    ================= Skin/Catheters============================  No rashes. Peripheral IV lines.   Both arms with pitting edema, right AC with ecchymosis and left AC with blisters     - =================Prophylaxis =============================  Off Lovenox for DVT, on SCD   Protonix for GI proph    ==================GOC==================================   FULL CODE Assessment and plan: 54 y/o M with no PMH is admitted to ICU for AHRF 2/2 covid19 pna     1. Acute hypoxic respiratory failure  2. ARDS 2/2 Covid pneumonia  3. Transaminitis  4. Prediabetes  5. Abnormal TSH  6. Pneumothorax    =================== Neuro============================  -Alert and oriented x 3 at baseline   -Intubated and sedated 3/29 on Vent  -Trach 4/22 continues on Vent    -Was started on precedex last night  -Continue Precedex and off Fentanyl drip, on 1 mcg of precedex  -Was started on Methadone 5 mg BID   - fentanyl patch was d/c but later was restarted   -Started on Ativan PRN, Not helping much   -Off versed   -d/cd propofol  -CT head unremarkable     ================= Cardiovascular==========================  # Hypotension improving  - was hypotensive overnight  Was given fluid bolus after which he improved    # TACHYCARDIA: recurrent episodes   -Lopressor PRN pushes   -HR in 110's  -Continue monitoring     ================- Pulm=================================  #Acute hypoxic respiratory failure: secondary to Covid19 pneumonia  #ARDS  -Was on HFNC then got intubated 3/29  -D-dimer initially elevated on presentation, trending down    -s/p Nimbex and Epifanio at different occasions for vent synchrony  -s/p Pigtail on left chest, removed on 4/15  -Large Pneumothorax was noted on 4/18 on left side, s/p chest tube placement to suction since 4/27, still has pneumothorax-   -Trach 4/22 continues on Vent   -Remdesivir was discontinued due to positive antibodies   - Finished Dexamethasone   - Prednisone taper Finished  - Covid19 PCR negative now, off isolation   - C/w prednisone 40 daily for possible organizing pneumonia   -ABG showed hypercapnia this morning, changed to volume control setting  - Chest tube was kinked, which was fixed by surgery, pneumothorax improved after that  - vent setting were changed to volume control, as patient was not pulling enough Tv      # Bacterial Pneumonia  - Stable infiltrate on CXR ,likely developed Bacterial pneumonia , Finished ABx Zosyn, meropenem, s/p 1 dose of Vancomycin  - MRSA negative from 3/26  - Sputum Cx growing few gram negative rods, CRE  - Secretions from the trach, needs frequent suctions   - Elevated peak and plateau pressures, TV and RR rate decreased, will repeat ABG   - Pulm. Dr. Ryan  - ID Dr Anand       #Pneumothorax and pneumomediastinum:   -X-ray chest: remains with left pneumothorax  -Thoracic surgery following  -s/p Chest tube placed 4/8 pigtail to wall suction discontinued on 4/15  -On 4/18 chest tube was placed back on left lung to treat pneumothorax-  chest tube to suction    -CXR 4/26 still shows left lung pneumothorax - CXR 4/29 persistent pneumothorax     -CXR shows persistent PTX      ==================ID===================================  Likely bacterial pneumonia   -leukocytosis resolved  -No more fever, On Tylenol PRN only   -Finished Meropenem  -plan as above     ================= Nephro================================  -Pitting edema to both arms, ecchymosis on right AC, blisters on left arm around brachial area    -on condom cath   -monitor I/Os , good urine output overall  -s/p Albumin x 2 days on 4/10  -Lasix 40mg BID with Albumin 25% Q6 for 48 hours to help with urine output and fluid status.( 4/15) Albumin finished , was D/jennifer on 4/19, urine output decreasing  - Patient can get Lasix as needed   - Metabolic Alkalosis likely due to compensation, improving     =================GI====================================  Transaminitis:   likely secondary to Covid19   - and  on presentation   hepatitis panel -ve  -Improved, now normalized   -continue to monitor LFT    Diet:   S/p NGT (3/29) with tube feed  C/w bowel regimen   G tube 4/23, on tube feeds    Off rectal tube       ================ Heme==================================  Elevated d-dimer: likely secondary to Covid19   -D-dimer 423 on admission  -Last D-dimer 1434  -Off prophylactic Lovenox 40 mg daily for concern of bleed (drop in hb)    Anemia  On 4/26 Hb 8.8 dropped to 6.8, s/p 1 unit of PRBC repeat hb 7.2  On 4/27 Hb 7, s/p 2 units of PRBC hb remains 7  On 4/28 Hb 6.8, s/p 1 unit of PRBC hb 7.5 now  4 PRBC total    CTH and CT abdomen w/o contrast no evidence of bleed    No active signs of bleeding (No hematemesis, melena or hematuria)  F/u hemolysis w/u- negative so far, elevated reticulocytes   Dr Andrade on board   CBC on 5/3 was 7.7  Will Monitor  F/u CBC daily     =================Endocrine===============================  Prediabetes:  -A1c 5.8  -BS controlled  -continue HSS    Abnormal TSH:  -TSH level noted 0.26,   -Repeat TSH 0.37 and Free T4 1.82    ================= Skin/Catheters============================  No rashes. Peripheral IV lines.   Both arms with pitting edema, right AC with ecchymosis and left AC with blisters     - =================Prophylaxis =============================  Off Lovenox for DVT, on SCD   Protonix for GI proph    ==================GOC==================================   FULL CODE

## 2021-05-04 NOTE — PHYSICAL THERAPY INITIAL EVALUATION ADULT - LEVEL OF INDEPENDENCE: BED TO CHAIR, REHAB EVAL
TBA as arousal attention and ability to follow commands improve
TBA as arousal attention and ability to follow commands improve
via ~2ft amb.: With Standing 1 minute by chair Spo2 decreased to 91% on HFNC+NRB, RR mid 40s max, HR ~115 max. Pt. reports "moderate" exertion w/activity. ~ 45 sec seate drest for return to baseline. Further amb to be attempted as aerobic capacity/endurance improve./stand-by assist
TBA as gross strength and ability to follow commands improve. Would require Total A and mechanical lift at this time

## 2021-05-04 NOTE — PHYSICAL THERAPY INITIAL EVALUATION ADULT - ACTIVE RANGE OF MOTION EXAMINATION, REHAB EVAL
few deg motion ankle/feet and wrist hands; otherwise no active motion secondary to impaired strength

## 2021-05-04 NOTE — PHYSICAL THERAPY INITIAL EVALUATION ADULT - LEVEL OF INDEPENDENCE: STAIR NEGOTIATION, REHAB EVAL
TBA as arousal attention and ability to follow commands improve
TBA as gross strength and ability to follow commands improve.
TBA this higher MET level activity as aerobic capacity/endurance improves.
TBA as arousal attention and ability to follow commands improve

## 2021-05-04 NOTE — PROGRESS NOTE ADULT - SUBJECTIVE AND OBJECTIVE BOX
Patient is a 55y old  Male who presents with a chief complaint of Dyspnea (04 May 2021 07:57)  Patient is awake, alert, laying in bed in mild distress. Having moderate amount of thick yellowish secretions via trach. +loose stools. Tachypneic     INTERVAL HPI/OVERNIGHT EVENTS:      VITAL SIGNS:  T(F): 99.9 (05-04-21 @ 10:00)  HR: 159 (05-04-21 @ 10:00)  BP: 127/57 (05-04-21 @ 10:00)  RR: 38 (05-04-21 @ 10:00)  SpO2: 100% (05-04-21 @ 10:00)  Wt(kg): --  I&O's Detail    03 May 2021 07:01  -  04 May 2021 07:00  --------------------------------------------------------  IN:    Dexmedetomidine: 451.5 mL    Enteral Tube Flush: 350 mL    Propofol: 50.1 mL    Sodium Chloride 0.9% Bolus: 500 mL  Total IN: 1351.6 mL    OUT:    Chest Tube (mL): 340 mL    Incontinent per Condom Catheter (mL): 1200 mL    Jevity 1.5: 0 mL  Total OUT: 1540 mL    Total NET: -188.4 mL        Mode: AC/ CMV (Assist Control/ Continuous Mandatory Ventilation)  RR (machine): 22  FiO2: 60  PEEP: 5  ITime: 0.7  MAP: 15  PC: 25  PIP: 31        REVIEW OF SYSTEMS:    CONSTITUTIONAL:  No fevers, chills, sweats    HEENT:  Eyes:  No diplopia or blurred vision. ENT:  No earache, sore throat or runny nose.    CARDIOVASCULAR:  No pressure, squeezing, tightness, or heaviness about the chest; no palpitations.    RESPIRATORY:  Per HPI    GASTROINTESTINAL:  No abdominal pain, nausea, vomiting or diarrhea.    GENITOURINARY:  No dysuria, frequency or urgency.    NEUROLOGIC:  No paresthesias, fasciculations, seizures or weakness.    PSYCHIATRIC:  No disorder of thought or mood.      PHYSICAL EXAM:    Constitutional: Well developed and nourished  Eyes:Perrla  ENMT: normal  Neck:supple  Respiratory: good air entry  Cardiovascular: S1 S2 regular  Gastrointestinal: Soft, Non tender  Extremities: + edema  Vascular:normal  Neurological:Awake, alert,Ox3  Musculoskeletal:Normal      MEDICATIONS  (STANDING):  ascorbic acid 1000 milliGRAM(s) Oral daily  BACItracin   Ointment 1 Application(s) Topical two times a day  chlorhexidine 2% Cloths 1 Application(s) Topical <User Schedule>  dexMEDEtomidine Infusion 0.2 MICROgram(s)/kG/Hr (4.2 mL/Hr) IV Continuous <Continuous>  fentaNYL   Patch  50 MICROgram(s)/Hr. 1 Patch Transdermal every 48 hours  HYDROmorphone  Injectable 1 milliGRAM(s) IV Push every 8 hours  insulin lispro (ADMELOG) corrective regimen sliding scale   SubCutaneous every 6 hours  methadone    Tablet 5 milliGRAM(s) Oral two times a day  OLANZapine 5 milliGRAM(s) Oral two times a day  pantoprazole  Injectable 40 milliGRAM(s) IV Push every 12 hours  polyethylene glycol 3350 17 Gram(s) Oral two times a day  predniSONE   Tablet 40 milliGRAM(s) Oral daily  senna 2 Tablet(s) Oral at bedtime    MEDICATIONS  (PRN):  ALBUTerol    90 MICROgram(s) HFA Inhaler 2 Puff(s) Inhalation every 6 hours PRN Shortness of Breath and/or Wheezing  artificial  tears Solution 1 Drop(s) Both EYES every 4 hours PRN Dry Eyes  LORazepam   Injectable 4 milliGRAM(s) IV Push every 6 hours PRN Agitation  sodium chloride 0.9% lock flush 10 milliLiter(s) IV Push every 1 hour PRN Pre/post blood products, medications, blood draw, and to maintain line patency      Allergies    No Known Allergies    Intolerances        LABS:                        8.8    9.73  )-----------( 381      ( 04 May 2021 03:49 )             29.8     05-04    140  |  101  |  17  ----------------------------<  98  3.4<L>   |  38<H>  |  0.31<L>    Ca    8.3<L>      04 May 2021 03:49  Phos  4.7     05-04  Mg     2.0     05-04    TPro  6.3  /  Alb  2.0<L>  /  TBili  0.9  /  DBili  x   /  AST  29  /  ALT  31  /  AlkPhos  89  05-04        ABG - ( 04 May 2021 04:12 )  pH, Arterial: 7.34  pH, Blood: x     /  pCO2: 77    /  pO2: 94    / HCO3: 42    / Base Excess: 12.2  /  SaO2: 99                    CAPILLARY BLOOD GLUCOSE      POCT Blood Glucose.: 90 mg/dL (04 May 2021 05:48)  POCT Blood Glucose.: 90 mg/dL (03 May 2021 21:52)  POCT Blood Glucose.: 108 mg/dL (03 May 2021 17:55)  POCT Blood Glucose.: 135 mg/dL (03 May 2021 11:19)    pro-bnp -- 05-04 @ 03:49     d-dimer 2819  05-04 @ 03:49      RADIOLOGY & ADDITIONAL TESTS:    CXR:  < from: Xray Chest 1 View- PORTABLE-Urgent (Xray Chest 1 View- PORTABLE-Urgent .) (05.03.21 @ 21:49) >  IMPRESSION:  Decreased left pneumothorax.    Thank you for the courtesy of this referral.      < end of copied text >    Ct scan chest:    ekg;    echo:

## 2021-05-05 LAB
ALBUMIN SERPL ELPH-MCNC: 1.8 G/DL — LOW (ref 3.5–5)
ALP SERPL-CCNC: 74 U/L — SIGNIFICANT CHANGE UP (ref 40–120)
ALT FLD-CCNC: 24 U/L DA — SIGNIFICANT CHANGE UP (ref 10–60)
ANION GAP SERPL CALC-SCNC: 1 MMOL/L — LOW (ref 5–17)
AST SERPL-CCNC: 21 U/L — SIGNIFICANT CHANGE UP (ref 10–40)
BASE EXCESS BLDA CALC-SCNC: 13.6 MMOL/L — HIGH (ref -2–3)
BILIRUB SERPL-MCNC: 0.6 MG/DL — SIGNIFICANT CHANGE UP (ref 0.2–1.2)
BLOOD GAS COMMENTS ARTERIAL: SIGNIFICANT CHANGE UP
BUN SERPL-MCNC: 14 MG/DL — SIGNIFICANT CHANGE UP (ref 7–18)
CALCIUM SERPL-MCNC: 8 MG/DL — LOW (ref 8.4–10.5)
CHLORIDE SERPL-SCNC: 102 MMOL/L — SIGNIFICANT CHANGE UP (ref 96–108)
CO2 SERPL-SCNC: 38 MMOL/L — HIGH (ref 22–31)
CREAT SERPL-MCNC: 0.21 MG/DL — LOW (ref 0.5–1.3)
GLUCOSE BLDC GLUCOMTR-MCNC: 105 MG/DL — HIGH (ref 70–99)
GLUCOSE BLDC GLUCOMTR-MCNC: 110 MG/DL — HIGH (ref 70–99)
GLUCOSE BLDC GLUCOMTR-MCNC: 117 MG/DL — HIGH (ref 70–99)
GLUCOSE BLDC GLUCOMTR-MCNC: 82 MG/DL — SIGNIFICANT CHANGE UP (ref 70–99)
GLUCOSE SERPL-MCNC: 99 MG/DL — SIGNIFICANT CHANGE UP (ref 70–99)
HCO3 BLDA-SCNC: 38 MMOL/L — HIGH (ref 21–28)
HCT VFR BLD CALC: 23.4 % — LOW (ref 39–50)
HGB BLD-MCNC: 7.4 G/DL — LOW (ref 13–17)
HOROWITZ INDEX BLDA+IHG-RTO: 40 — SIGNIFICANT CHANGE UP
MAGNESIUM SERPL-MCNC: 2 MG/DL — SIGNIFICANT CHANGE UP (ref 1.6–2.6)
MCHC RBC-ENTMCNC: 31.6 GM/DL — LOW (ref 32–36)
MCHC RBC-ENTMCNC: 32 PG — SIGNIFICANT CHANGE UP (ref 27–34)
MCV RBC AUTO: 101.3 FL — HIGH (ref 80–100)
NRBC # BLD: 0 /100 WBCS — SIGNIFICANT CHANGE UP (ref 0–0)
PCO2 BLDA: 47 MMHG — SIGNIFICANT CHANGE UP (ref 35–48)
PH BLDA: 7.52 — HIGH (ref 7.35–7.45)
PHOSPHATE SERPL-MCNC: 2 MG/DL — LOW (ref 2.5–4.5)
PLATELET # BLD AUTO: 309 K/UL — SIGNIFICANT CHANGE UP (ref 150–400)
PO2 BLDA: 119 MMHG — HIGH (ref 83–108)
POTASSIUM SERPL-MCNC: 3.7 MMOL/L — SIGNIFICANT CHANGE UP (ref 3.5–5.3)
POTASSIUM SERPL-SCNC: 3.7 MMOL/L — SIGNIFICANT CHANGE UP (ref 3.5–5.3)
PROT SERPL-MCNC: 5.4 G/DL — LOW (ref 6–8.3)
RBC # BLD: 2.31 M/UL — LOW (ref 4.2–5.8)
RBC # FLD: 16.7 % — HIGH (ref 10.3–14.5)
SAO2 % BLDA: 99 % — SIGNIFICANT CHANGE UP
SODIUM SERPL-SCNC: 141 MMOL/L — SIGNIFICANT CHANGE UP (ref 135–145)
WBC # BLD: 9.85 K/UL — SIGNIFICANT CHANGE UP (ref 3.8–10.5)
WBC # FLD AUTO: 9.85 K/UL — SIGNIFICANT CHANGE UP (ref 3.8–10.5)

## 2021-05-05 PROCEDURE — 71045 X-RAY EXAM CHEST 1 VIEW: CPT | Mod: 26

## 2021-05-05 PROCEDURE — 71250 CT THORAX DX C-: CPT | Mod: 26

## 2021-05-05 RX ORDER — MIDAZOLAM HYDROCHLORIDE 1 MG/ML
4 INJECTION, SOLUTION INTRAMUSCULAR; INTRAVENOUS ONCE
Refills: 0 | Status: DISCONTINUED | OUTPATIENT
Start: 2021-05-05 | End: 2021-05-05

## 2021-05-05 RX ORDER — HYDROMORPHONE HYDROCHLORIDE 2 MG/ML
1 INJECTION INTRAMUSCULAR; INTRAVENOUS; SUBCUTANEOUS ONCE
Refills: 0 | Status: DISCONTINUED | OUTPATIENT
Start: 2021-05-05 | End: 2021-05-05

## 2021-05-05 RX ORDER — SODIUM,POTASSIUM PHOSPHATES 278-250MG
1 POWDER IN PACKET (EA) ORAL ONCE
Refills: 0 | Status: COMPLETED | OUTPATIENT
Start: 2021-05-05 | End: 2021-05-05

## 2021-05-05 RX ORDER — ACETAMINOPHEN 500 MG
650 TABLET ORAL EVERY 12 HOURS
Refills: 0 | Status: DISCONTINUED | OUTPATIENT
Start: 2021-05-05 | End: 2021-05-08

## 2021-05-05 RX ORDER — ENOXAPARIN SODIUM 100 MG/ML
40 INJECTION SUBCUTANEOUS EVERY 24 HOURS
Refills: 0 | Status: DISCONTINUED | OUTPATIENT
Start: 2021-05-05 | End: 2021-06-11

## 2021-05-05 RX ORDER — CLONAZEPAM 1 MG
1 TABLET ORAL EVERY 8 HOURS
Refills: 0 | Status: DISCONTINUED | OUTPATIENT
Start: 2021-05-05 | End: 2021-05-08

## 2021-05-05 RX ADMIN — CHLORHEXIDINE GLUCONATE 15 MILLILITER(S): 213 SOLUTION TOPICAL at 17:07

## 2021-05-05 RX ADMIN — Medication 4 MILLIGRAM(S): at 11:30

## 2021-05-05 RX ADMIN — ENOXAPARIN SODIUM 40 MILLIGRAM(S): 100 INJECTION SUBCUTANEOUS at 11:29

## 2021-05-05 RX ADMIN — HYDROMORPHONE HYDROCHLORIDE 1 MILLIGRAM(S): 2 INJECTION INTRAMUSCULAR; INTRAVENOUS; SUBCUTANEOUS at 21:01

## 2021-05-05 RX ADMIN — POLYETHYLENE GLYCOL 3350 17 GRAM(S): 17 POWDER, FOR SOLUTION ORAL at 05:12

## 2021-05-05 RX ADMIN — Medication 1 TABLET(S): at 13:33

## 2021-05-05 RX ADMIN — HYDROMORPHONE HYDROCHLORIDE 1 MILLIGRAM(S): 2 INJECTION INTRAMUSCULAR; INTRAVENOUS; SUBCUTANEOUS at 13:00

## 2021-05-05 RX ADMIN — OLANZAPINE 5 MILLIGRAM(S): 15 TABLET, FILM COATED ORAL at 05:12

## 2021-05-05 RX ADMIN — PANTOPRAZOLE SODIUM 40 MILLIGRAM(S): 20 TABLET, DELAYED RELEASE ORAL at 17:07

## 2021-05-05 RX ADMIN — MIDAZOLAM HYDROCHLORIDE 4 MILLIGRAM(S): 1 INJECTION, SOLUTION INTRAMUSCULAR; INTRAVENOUS at 16:29

## 2021-05-05 RX ADMIN — CHLORHEXIDINE GLUCONATE 1 APPLICATION(S): 213 SOLUTION TOPICAL at 05:11

## 2021-05-05 RX ADMIN — Medication 4 MILLIGRAM(S): at 05:10

## 2021-05-05 RX ADMIN — PANTOPRAZOLE SODIUM 40 MILLIGRAM(S): 20 TABLET, DELAYED RELEASE ORAL at 05:10

## 2021-05-05 RX ADMIN — SENNA PLUS 2 TABLET(S): 8.6 TABLET ORAL at 21:01

## 2021-05-05 RX ADMIN — DEXMEDETOMIDINE HYDROCHLORIDE IN 0.9% SODIUM CHLORIDE 4.2 MICROGRAM(S)/KG/HR: 4 INJECTION INTRAVENOUS at 21:02

## 2021-05-05 RX ADMIN — OLANZAPINE 5 MILLIGRAM(S): 15 TABLET, FILM COATED ORAL at 17:06

## 2021-05-05 RX ADMIN — METHADONE HYDROCHLORIDE 5 MILLIGRAM(S): 40 TABLET ORAL at 05:11

## 2021-05-05 RX ADMIN — FENTANYL CITRATE 1 PATCH: 50 INJECTION INTRAVENOUS at 17:08

## 2021-05-05 RX ADMIN — Medication 650 MILLIGRAM(S): at 18:41

## 2021-05-05 RX ADMIN — Medication 1 MILLIGRAM(S): at 17:07

## 2021-05-05 RX ADMIN — Medication 1 APPLICATION(S): at 17:07

## 2021-05-05 RX ADMIN — Medication 1000 MILLIGRAM(S): at 11:30

## 2021-05-05 RX ADMIN — Medication 40 MILLIGRAM(S): at 05:11

## 2021-05-05 RX ADMIN — Medication 1 MILLIGRAM(S): at 21:01

## 2021-05-05 RX ADMIN — FENTANYL CITRATE 1 PATCH: 50 INJECTION INTRAVENOUS at 07:39

## 2021-05-05 RX ADMIN — HYDROMORPHONE HYDROCHLORIDE 1 MILLIGRAM(S): 2 INJECTION INTRAMUSCULAR; INTRAVENOUS; SUBCUTANEOUS at 12:46

## 2021-05-05 RX ADMIN — CHLORHEXIDINE GLUCONATE 15 MILLILITER(S): 213 SOLUTION TOPICAL at 05:11

## 2021-05-05 RX ADMIN — HYDROMORPHONE HYDROCHLORIDE 1 MILLIGRAM(S): 2 INJECTION INTRAMUSCULAR; INTRAVENOUS; SUBCUTANEOUS at 05:10

## 2021-05-05 RX ADMIN — Medication 4 MILLIGRAM(S): at 16:40

## 2021-05-05 RX ADMIN — DEXMEDETOMIDINE HYDROCHLORIDE IN 0.9% SODIUM CHLORIDE 4.2 MICROGRAM(S)/KG/HR: 4 INJECTION INTRAVENOUS at 05:58

## 2021-05-05 RX ADMIN — HYDROMORPHONE HYDROCHLORIDE 1 MILLIGRAM(S): 2 INJECTION INTRAMUSCULAR; INTRAVENOUS; SUBCUTANEOUS at 22:06

## 2021-05-05 RX ADMIN — HYDROMORPHONE HYDROCHLORIDE 1 MILLIGRAM(S): 2 INJECTION INTRAMUSCULAR; INTRAVENOUS; SUBCUTANEOUS at 18:47

## 2021-05-05 RX ADMIN — Medication 1 PACKET(S): at 09:49

## 2021-05-05 RX ADMIN — HYDROMORPHONE HYDROCHLORIDE 1 MILLIGRAM(S): 2 INJECTION INTRAMUSCULAR; INTRAVENOUS; SUBCUTANEOUS at 18:55

## 2021-05-05 RX ADMIN — DEXMEDETOMIDINE HYDROCHLORIDE IN 0.9% SODIUM CHLORIDE 4.2 MICROGRAM(S)/KG/HR: 4 INJECTION INTRAVENOUS at 16:29

## 2021-05-05 RX ADMIN — METHADONE HYDROCHLORIDE 5 MILLIGRAM(S): 40 TABLET ORAL at 17:06

## 2021-05-05 RX ADMIN — HYDROMORPHONE HYDROCHLORIDE 1 MILLIGRAM(S): 2 INJECTION INTRAMUSCULAR; INTRAVENOUS; SUBCUTANEOUS at 05:57

## 2021-05-05 RX ADMIN — Medication 4 MILLIGRAM(S): at 22:06

## 2021-05-05 RX ADMIN — Medication 1 MILLIGRAM(S): at 09:49

## 2021-05-05 RX ADMIN — Medication 1 APPLICATION(S): at 05:10

## 2021-05-05 NOTE — CHART NOTE - NSCHARTNOTEFT_GEN_A_CORE
Called by ICU team after pt was noted to be leaking through steristrips. Pt examined at bedside. Middle aspect of midline dressing saturated and thus removed. Seroma noted from superior aspect of wound. Wound probed with cotton swab, cavity noted at left aspect of wound, and seroma drained at bedside. Pt tolerated procedure. Wound packed with dry gauze and covered, both to be changed daily. Nurse and attending made aware.

## 2021-05-05 NOTE — PROGRESS NOTE ADULT - ATTENDING COMMENTS
55 yr old  man , non smoker with  moody 1990s presented 3/14 with x9 days worsening cough, subjective fevers, and SOB, with x2-3 days dysuria and central, non-radiating, constant CP. Admitted to medicine unit  for acute hypoxic respiratory failure secondary to pna from covid-19 infection .     Assessment:  1. Acute hypoxic respiratory failure  2. Covid-19 infection   3. Transaminitis  4. Prediabetes  5. Bilateral pneumothorax  6. Septic shock - resolved  7. Anemia    Plan   -Remains with non expansion of the left upper lobe, ?need for possible anterior chest tube vs. 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 mechanical ventilation   -Chest tube to suction  -Cont. prednisone 40 mg for possible organizing pneumonia as evidence on CT scan  -Fentanyl path  -Start Clonazepam and titrate down Precedex  -methadone for opioid dependence  -PT evaluation  -dvt/gi prophy  -hemodynamic monitoring   -Prognosis is guarded

## 2021-05-05 NOTE — PROGRESS NOTE ADULT - SUBJECTIVE AND OBJECTIVE BOX
Patient is a 55y old  Male who presents with a chief complaint of SOB (05 May 2021 06:58)  Patient is awake, not alert, lying in bed  in NAD. Still producing copious amount of thick secretions via trach. FIO2 decreased to 40%    INTERVAL HPI/OVERNIGHT EVENTS:      VITAL SIGNS:  T(F): 99 (05-05-21 @ 08:00)  HR: 105 (05-05-21 @ 10:00)  BP: 140/77 (05-05-21 @ 10:00)  RR: 42 (05-05-21 @ 10:00)  SpO2: 94% (05-05-21 @ 10:00)  Wt(kg): --  I&O's Detail    04 May 2021 07:01  -  05 May 2021 07:00  --------------------------------------------------------  IN:    Dexmedetomidine: 452 mL    Enteral Tube Flush: 400 mL    Jevity 1.5: 120 mL    Propofol: 5 mL  Total IN: 977 mL    OUT:    Chest Tube (mL): 100 mL    Incontinent per Condom Catheter (mL): 1980 mL  Total OUT: 2080 mL    Total NET: -1103 mL      05 May 2021 07:01  -  05 May 2021 10:38  --------------------------------------------------------  IN:    Dexmedetomidine: 42 mL  Total IN: 42 mL    OUT:    Incontinent per Condom Catheter (mL): 0 mL    Jevity 1.5: 0 mL  Total OUT: 0 mL    Total NET: 42 mL        Mode: AC/ CMV (Assist Control/ Continuous Mandatory Ventilation)  RR (machine): 22  TV (machine): 400  FiO2: 40  PEEP: 5  ITime: 0.7  MAP: 14  PIP: 41        REVIEW OF SYSTEMS:    CONSTITUTIONAL:  No fevers, chills, sweats    HEENT:  Eyes:  No diplopia or blurred vision. ENT:  No earache, sore throat or runny nose.    CARDIOVASCULAR:  No pressure, squeezing, tightness, or heaviness about the chest; no palpitations.    RESPIRATORY:  Per HPI    GASTROINTESTINAL:  No abdominal pain, nausea, vomiting or diarrhea.    GENITOURINARY:  No dysuria, frequency or urgency.    NEUROLOGIC:  No paresthesias, fasciculations, seizures or weakness.    PSYCHIATRIC:  No disorder of thought or mood.      PHYSICAL EXAM:    Constitutional: Well developed and nourished  Eyes:Perrla  ENMT: normal  Neck:supple  Respiratory: Ronchi bilaterally  Cardiovascular: S1 S2 regular  Gastrointestinal: Soft, Non tender  Extremities: No edema  Vascular:normal  Neurological:Awake, not alert  Musculoskeletal:Normal      MEDICATIONS  (STANDING):  ascorbic acid 1000 milliGRAM(s) Oral daily  BACItracin   Ointment 1 Application(s) Topical two times a day  chlorhexidine 0.12% Liquid 15 milliLiter(s) Oral Mucosa every 12 hours  chlorhexidine 2% Cloths 1 Application(s) Topical <User Schedule>  clonazePAM  Tablet 1 milliGRAM(s) Oral every 8 hours  dexMEDEtomidine Infusion 0.2 MICROgram(s)/kG/Hr (4.2 mL/Hr) IV Continuous <Continuous>  enoxaparin Injectable 40 milliGRAM(s) SubCutaneous every 24 hours  fentaNYL   Patch  50 MICROgram(s)/Hr. 1 Patch Transdermal every 48 hours  HYDROmorphone  Injectable 1 milliGRAM(s) IV Push every 8 hours  insulin lispro (ADMELOG) corrective regimen sliding scale   SubCutaneous every 6 hours  methadone    Tablet 5 milliGRAM(s) Oral two times a day  OLANZapine 5 milliGRAM(s) Oral two times a day  pantoprazole  Injectable 40 milliGRAM(s) IV Push every 12 hours  polyethylene glycol 3350 17 Gram(s) Oral two times a day  predniSONE   Tablet 40 milliGRAM(s) Oral daily  senna 2 Tablet(s) Oral at bedtime    MEDICATIONS  (PRN):  ALBUTerol    90 MICROgram(s) HFA Inhaler 2 Puff(s) Inhalation every 6 hours PRN Shortness of Breath and/or Wheezing  artificial  tears Solution 1 Drop(s) Both EYES every 4 hours PRN Dry Eyes  LORazepam   Injectable 4 milliGRAM(s) IV Push every 6 hours PRN Agitation  sodium chloride 0.9% lock flush 10 milliLiter(s) IV Push every 1 hour PRN Pre/post blood products, medications, blood draw, and to maintain line patency      Allergies    No Known Allergies    Intolerances        LABS:                        7.4    9.85  )-----------( 309      ( 05 May 2021 03:47 )             23.4     05-05    141  |  102  |  14  ----------------------------<  99  3.7   |  38<H>  |  0.21<L>    Ca    8.0<L>      05 May 2021 03:47  Phos  2.0     05-05  Mg     2.0     05-05    TPro  5.4<L>  /  Alb  1.8<L>  /  TBili  0.6  /  DBili  x   /  AST  21  /  ALT  24  /  AlkPhos  74  05-05        ABG - ( 05 May 2021 04:39 )  pH, Arterial: 7.52  pH, Blood: x     /  pCO2: 47    /  pO2: 119   / HCO3: 38    / Base Excess: 13.6  /  SaO2: 99                    CAPILLARY BLOOD GLUCOSE      POCT Blood Glucose.: 105 mg/dL (05 May 2021 05:54)  POCT Blood Glucose.: 102 mg/dL (04 May 2021 23:23)  POCT Blood Glucose.: 101 mg/dL (04 May 2021 17:53)  POCT Blood Glucose.: 135 mg/dL (04 May 2021 12:08)    pro-bnp -- 05-04 @ 03:49     d-dimer 2819  05-04 @ 03:49      RADIOLOGY & ADDITIONAL TESTS:    CXR:  < from: Xray Chest 1 View- PORTABLE-Urgent (Xray Chest 1 View- PORTABLE-Urgent .) (05.04.21 @ 01:48) >  IMPRESSION: No significant interval change.    < end of copied text >    Ct scan chest:    ekg;    echo:

## 2021-05-05 NOTE — PROGRESS NOTE ADULT - SUBJECTIVE AND OBJECTIVE BOX
INTERVAL HPI/OVERNIGHT EVENTS:  Patient seen and examined at bedside.   Pt resting comfortably.     MEDICATIONS  (STANDING):  ascorbic acid 1000 milliGRAM(s) Oral daily  BACItracin   Ointment 1 Application(s) Topical two times a day  chlorhexidine 0.12% Liquid 15 milliLiter(s) Oral Mucosa every 12 hours  chlorhexidine 2% Cloths 1 Application(s) Topical <User Schedule>  clonazePAM  Tablet 1 milliGRAM(s) Oral every 8 hours  dexMEDEtomidine Infusion 0.2 MICROgram(s)/kG/Hr (4.2 mL/Hr) IV Continuous <Continuous>  enoxaparin Injectable 40 milliGRAM(s) SubCutaneous every 24 hours  fentaNYL   Patch  50 MICROgram(s)/Hr. 1 Patch Transdermal every 48 hours  HYDROmorphone  Injectable 1 milliGRAM(s) IV Push every 8 hours  insulin lispro (ADMELOG) corrective regimen sliding scale   SubCutaneous every 6 hours  methadone    Tablet 5 milliGRAM(s) Oral two times a day  OLANZapine 5 milliGRAM(s) Oral two times a day  pantoprazole  Injectable 40 milliGRAM(s) IV Push every 12 hours  polyethylene glycol 3350 17 Gram(s) Oral two times a day  predniSONE   Tablet 40 milliGRAM(s) Oral daily  senna 2 Tablet(s) Oral at bedtime  trimethoprim  160 mG/sulfamethoxazole 800 mG 1 Tablet(s) Oral <User Schedule>    MEDICATIONS  (PRN):  ALBUTerol    90 MICROgram(s) HFA Inhaler 2 Puff(s) Inhalation every 6 hours PRN Shortness of Breath and/or Wheezing  artificial  tears Solution 1 Drop(s) Both EYES every 4 hours PRN Dry Eyes  LORazepam   Injectable 4 milliGRAM(s) IV Push every 6 hours PRN Agitation  sodium chloride 0.9% lock flush 10 milliLiter(s) IV Push every 1 hour PRN Pre/post blood products, medications, blood draw, and to maintain line patency      Vital Signs Last 24 Hrs  T(C): 37.2 (05 May 2021 08:00), Max: 38.3 (04 May 2021 20:00)  T(F): 99 (05 May 2021 08:00), Max: 100.9 (04 May 2021 20:00)  HR: 75 (05 May 2021 12:20) (75 - 129)  BP: 140/77 (05 May 2021 10:00) (87/48 - 167/94)  BP(mean): 93 (05 May 2021 10:00) (57 - 109)  RR: 42 (05 May 2021 10:00) (14 - 46)  SpO2: 91% (05 May 2021 12:20) (85% - 100%)    Physical:  General: NAD.  Chest: Left pigtail in place to suction, no airleak, dressing C/D/I without surround crepitus, pigtail flushed at bedside  Abdomen: Soft nondistended, nontender.    I&O's Detail    04 May 2021 07:01  -  05 May 2021 07:00  --------------------------------------------------------  IN:    Dexmedetomidine: 452 mL    Enteral Tube Flush: 400 mL    Jevity 1.5: 120 mL    Propofol: 5 mL  Total IN: 977 mL    OUT:    Chest Tube (mL): 100 mL    Incontinent per Condom Catheter (mL): 1980 mL  Total OUT: 2080 mL    Total NET: -1103 mL      05 May 2021 07:01  -  05 May 2021 12:28  --------------------------------------------------------  IN:    Dexmedetomidine: 42 mL  Total IN: 42 mL    OUT:    Incontinent per Condom Catheter (mL): 0 mL    Jevity 1.5: 0 mL  Total OUT: 0 mL    Total NET: 42 mL          LABS:                        7.4    9.85  )-----------( 309      ( 05 May 2021 03:47 )             23.4             05-05    141  |  102  |  14  ----------------------------<  99  3.7   |  38<H>  |  0.21<L>    Ca    8.0<L>      05 May 2021 03:47  Phos  2.0     05-05  Mg     2.0     05-05    TPro  5.4<L>  /  Alb  1.8<L>  /  TBili  0.6  /  DBili  x   /  AST  21  /  ALT  24  /  AlkPhos  74  05-05    CXR read pending from AM, CXR reviewed with Dr. Lanier

## 2021-05-05 NOTE — PROGRESS NOTE ADULT - ASSESSMENT
Assessment and plan: 56 y/o M with no PMH is admitted to ICU for AHRF 2/2 covid19 pna     1. Acute hypoxic respiratory failure  2. ARDS 2/2 Covid pneumonia  3. Transaminitis  4. Prediabetes  5. Abnormal TSH  6. Pneumothorax    =================== Neuro============================  -Alert and oriented x 3 at baseline   -Intubated and sedated 3/29 on Vent  -Trach 4/22 continues on Vent    -Continue Precedex and off Fentanyl drip, on 1 mcg of precedex, will try to titrate down  -Was started on Methadone 5 mg BID, will slowly titrate down  - fentanyl patch was d/c but later was restarted   -Started on Ativan PRN, Not helping much   -Off versed, d/cd propofol  -Started on klonopin  -CT head unremarkable     ================= Cardiovascular==========================  # Hypotension improving  Off pressors    # TACHYCARDIA: recurrent episodes   -HR in 110's  -Continue monitoring     ================- Pulm=================================  #Acute hypoxic respiratory failure: secondary to Covid19 pneumonia  #ARDS  -Was on HFNC then got intubated 3/29  -D-dimer initially elevated on presentation, trending down    -s/p Nimbex and Epifanio at different occasions for vent synchrony  -s/p Pigtail on left chest, removed on 4/15  -Large Pneumothorax was noted on 4/18 on left side, s/p chest tube placement to suction since 4/27, still has pneumothorax-   -Trach 4/22 continues on Vent   -Remdesivir was discontinued due to positive antibodies   - Finished Dexamethasone   - Prednisone taper Finished  - Covid19 PCR negative now, off isolation   - C/w prednisone 40 daily for possible organizing pneumonia   - Started on bactrim empirically, TIW  - Chest tube was kinked, which was fixed by surgery, pneumothorax improved after that  - vent setting were changed to volume control, as patient was not pulling enough Tv  On volume control setting, 22/400/40/5  -Will do SBt when less agitated      # Bacterial Pneumonia  - Stable infiltrate on CXR ,likely developed Bacterial pneumonia , Finished ABx Zosyn, meropenem, s/p 1 dose of Vancomycin  - MRSA negative from 3/26  - Sputum Cx growing few gram negative rods, CRE  - Secretions from the trach, needs frequent suctions   - Pulm. Dr. Ryan  - ID Dr Anand       #Pneumothorax and pneumomediastinum:   -X-ray chest: remains with left pneumothorax  -Thoracic surgery following  -s/p Chest tube placed 4/8 pigtail to wall suction discontinued on 4/15  -On 4/18 chest tube was placed back on left lung to treat pneumothorax-  chest tube to suction    -CXR 4/26 still shows left lung pneumothorax - CXR 4/29 persistent pneumothorax     -CXR shows persistent PTX  -Spoke to surgery regarding changing to anterior chest tube.      ==================ID===================================  Likely bacterial pneumonia   -leukocytosis resolved  -No more fever, On Tylenol PRN only   -Finished Meropenem  -plan as above     ================= Nephro================================  -Pitting edema to both arms, ecchymosis on right AC, blisters on left arm around brachial area    -on condom cath   -monitor I/Os , good urine output overall  -s/p Albumin x 2 days on 4/10  -Lasix 40mg BID with Albumin 25% Q6 for 48 hours to help with urine output and fluid status.( 4/15) Albumin finished , was D/jennifer on 4/19, urine output decreasing  - Patient can get Lasix as needed   - Metabolic Alkalosis likely due to compensation, improving     =================GI====================================  Transaminitis:   likely secondary to Covid19   - and  on presentation   hepatitis panel -ve  -Improved, now normalized   -continue to monitor LFT    Diet:   S/p NGT (3/29) with tube feed  C/w bowel regimen   G tube 4/23, on tube feeds    Had diarrhea, changed feeding to osmolite  c/w TF      ================ Heme==================================  Elevated d-dimer: likely secondary to Covid19   -D-dimer 423 on admission  -Last D-dimer 1434  - No active bleeding, restarted lovenox daily    Anemia  On 4/26 Hb 8.8 dropped to 6.8, s/p 1 unit of PRBC repeat hb 7.2  On 4/27 Hb 7, s/p 2 units of PRBC hb remains 7  On 4/28 Hb 6.8, s/p 1 unit of PRBC hb 7.5 now  4 PRBC total    CTH and CT abdomen w/o contrast no evidence of bleed    No active signs of bleeding (No hematemesis, melena or hematuria)  F/u hemolysis w/u- negative so far, elevated reticulocytes   Dr Andrade on board   CBC on 5/3 was 7.7  Will Monitor  F/u CBC daily     =================Endocrine===============================  Prediabetes:  -A1c 5.8  -BS controlled  -continue HSS    Abnormal TSH:  -TSH level noted 0.26,   -Repeat TSH 0.37 and Free T4 1.82    ================= Skin/Catheters============================  No rashes. Peripheral IV lines.   Both arms with pitting edema, right AC with ecchymosis and left AC with blisters     - =================Prophylaxis =============================  On Lovenox for DVT   Protonix for GI proph    ==================GOC==================================   FULL CODE

## 2021-05-05 NOTE — PROGRESS NOTE ADULT - SUBJECTIVE AND OBJECTIVE BOX
54 y/o M  s/p trach on 4/21, trach sutures were removed. No signs of infection around trach site, however right inferior pressure ulcer noted from the Tracheostomy faceplate. Tracheostomy appears in proper position. Pt. on the following vent settings: AC 22/400/40%/5, saturating well.     -sutures removed  -please place sponge dressing in between Tracheostomy faceplate and skin

## 2021-05-05 NOTE — PROGRESS NOTE ADULT - SUBJECTIVE AND OBJECTIVE BOX
INTERVAL HPI/OVERNIGHT EVENTS:   Hemoglobin dropped from 8.8 to 7.4.  Will titrate down precedex, added klonopin.  Will add bactrim empirically as patient is on chronic prednisone for organizing pneumonia.  Will try to do SBt when he is less agitated.  Tube feeds were held because of diarrhea    PRESSORS: [ ] YES [x ] NO  WHICH:    ANTIBIOTICS:                      Antimicrobial:    Cardiovascular:    Pulmonary:  ALBUTerol    90 MICROgram(s) HFA Inhaler 2 Puff(s) Inhalation every 6 hours PRN    Hematalogic:  enoxaparin Injectable 40 milliGRAM(s) SubCutaneous every 24 hours    Other:  artificial  tears Solution 1 Drop(s) Both EYES every 4 hours PRN  ascorbic acid 1000 milliGRAM(s) Oral daily  BACItracin   Ointment 1 Application(s) Topical two times a day  chlorhexidine 0.12% Liquid 15 milliLiter(s) Oral Mucosa every 12 hours  chlorhexidine 2% Cloths 1 Application(s) Topical <User Schedule>  clonazePAM  Tablet 1 milliGRAM(s) Oral every 8 hours  dexMEDEtomidine Infusion 0.2 MICROgram(s)/kG/Hr IV Continuous <Continuous>  fentaNYL   Patch  50 MICROgram(s)/Hr. 1 Patch Transdermal every 48 hours  HYDROmorphone  Injectable 1 milliGRAM(s) IV Push every 8 hours  insulin lispro (ADMELOG) corrective regimen sliding scale   SubCutaneous every 6 hours  LORazepam   Injectable 4 milliGRAM(s) IV Push every 6 hours PRN  methadone    Tablet 5 milliGRAM(s) Oral two times a day  OLANZapine 5 milliGRAM(s) Oral two times a day  pantoprazole  Injectable 40 milliGRAM(s) IV Push every 12 hours  polyethylene glycol 3350 17 Gram(s) Oral two times a day  predniSONE   Tablet 40 milliGRAM(s) Oral daily  senna 2 Tablet(s) Oral at bedtime  sodium chloride 0.9% lock flush 10 milliLiter(s) IV Push every 1 hour PRN    ALBUTerol    90 MICROgram(s) HFA Inhaler 2 Puff(s) Inhalation every 6 hours PRN  artificial  tears Solution 1 Drop(s) Both EYES every 4 hours PRN  ascorbic acid 1000 milliGRAM(s) Oral daily  BACItracin   Ointment 1 Application(s) Topical two times a day  chlorhexidine 0.12% Liquid 15 milliLiter(s) Oral Mucosa every 12 hours  chlorhexidine 2% Cloths 1 Application(s) Topical <User Schedule>  clonazePAM  Tablet 1 milliGRAM(s) Oral every 8 hours  dexMEDEtomidine Infusion 0.2 MICROgram(s)/kG/Hr IV Continuous <Continuous>  enoxaparin Injectable 40 milliGRAM(s) SubCutaneous every 24 hours  fentaNYL   Patch  50 MICROgram(s)/Hr. 1 Patch Transdermal every 48 hours  HYDROmorphone  Injectable 1 milliGRAM(s) IV Push every 8 hours  insulin lispro (ADMELOG) corrective regimen sliding scale   SubCutaneous every 6 hours  LORazepam   Injectable 4 milliGRAM(s) IV Push every 6 hours PRN  methadone    Tablet 5 milliGRAM(s) Oral two times a day  OLANZapine 5 milliGRAM(s) Oral two times a day  pantoprazole  Injectable 40 milliGRAM(s) IV Push every 12 hours  polyethylene glycol 3350 17 Gram(s) Oral two times a day  predniSONE   Tablet 40 milliGRAM(s) Oral daily  senna 2 Tablet(s) Oral at bedtime  sodium chloride 0.9% lock flush 10 milliLiter(s) IV Push every 1 hour PRN    Drug Dosing Weight  Height (cm): 167.6 (23 Apr 2021 23:15)  Weight (kg): 83.9 (23 Apr 2021 23:15)  BMI (kg/m2): 29.9 (23 Apr 2021 23:15)  BSA (m2): 1.93 (23 Apr 2021 23:15)    CENTRAL LINE: [ ] YES [x ] NO  LOCATION:   DATE INSERTED:  REMOVE: [ ] YES [ ] NO  EXPLAIN:    RIVERA: [ ] YES [x ] NO    DATE INSERTED:  REMOVE:  [ ] YES [ ] NO  EXPLAIN:    A-LINE:  [ ] YES [ x] NO  LOCATION:   DATE INSERTED:  REMOVE:  [ ] YES [ ] NO  EXPLAIN:    PMH -reviewed admission note, no change since admission    ICU Vital Signs Last 24 Hrs  T(C): 37.2 (05 May 2021 08:00), Max: 38.3 (04 May 2021 20:00)  T(F): 99 (05 May 2021 08:00), Max: 100.9 (04 May 2021 20:00)  HR: 105 (05 May 2021 10:00) (76 - 142)  BP: 140/77 (05 May 2021 10:00) (87/48 - 167/94)  BP(mean): 93 (05 May 2021 10:00) (57 - 109)  RR: 42 (05 May 2021 10:00) (14 - 46)  SpO2: 94% (05 May 2021 10:00) (85% - 100%)      ABG - ( 05 May 2021 04:39 )  pH, Arterial: 7.52  pH, Blood: x     /  pCO2: 47    /  pO2: 119   / HCO3: 38    / Base Excess: 13.6  /  SaO2: 99                    05-04 @ 07:01  -  05-05 @ 07:00  --------------------------------------------------------  IN: 977 mL / OUT: 2080 mL / NET: -1103 mL        Mode: AC/ CMV (Assist Control/ Continuous Mandatory Ventilation)  RR (machine): 22  TV (machine): 400  FiO2: 40  PEEP: 5  ITime: 0.7  MAP: 14  PIP: 41      PHYSICAL EXAM:    GENERAL: tachypneic trach to vent sat above 97%  EYES: EOMI, PERRLA  NECK: Supple, No JVD; Normal thyroid; Trachea midline: No LAD, trach    NERVOUS SYSTEM: sedated, able to follow commands    CHEST/LUNG: No rales, rhonchi, wheezing, breath sounds present bilaterally  HEART: Regular rate and rhythm; No murmurs, no gallops  ABDOMEN: Soft, Nontender, Nondistended; Bowel sounds present, no pain or masses on palpation, G tube in place- functional, no signs of infection   : condom cath in place  EXTREMITIES:  2+ Peripheral Pulses, No clubbing or cyanosis. Bilateral upper extremity edema with ecchymosis   SKIN: warm, intact, no lesions      LABS:  CBC Full  -  ( 05 May 2021 03:47 )  WBC Count : 9.85 K/uL  RBC Count : 2.31 M/uL  Hemoglobin : 7.4 g/dL  Hematocrit : 23.4 %  Platelet Count - Automated : 309 K/uL  Mean Cell Volume : 101.3 fl  Mean Cell Hemoglobin : 32.0 pg  Mean Cell Hemoglobin Concentration : 31.6 gm/dL  Auto Neutrophil # : x  Auto Lymphocyte # : x  Auto Monocyte # : x  Auto Eosinophil # : x  Auto Basophil # : x  Auto Neutrophil % : x  Auto Lymphocyte % : x  Auto Monocyte % : x  Auto Eosinophil % : x  Auto Basophil % : x    05-05    141  |  102  |  14  ----------------------------<  99  3.7   |  38<H>  |  0.21<L>    Ca    8.0<L>      05 May 2021 03:47  Phos  2.0     05-05  Mg     2.0     05-05    TPro  5.4<L>  /  Alb  1.8<L>  /  TBili  0.6  /  DBili  x   /  AST  21  /  ALT  24  /  AlkPhos  74  05-05            RADIOLOGY & ADDITIONAL STUDIES REVIEWED:  < from: Xray Chest 1 View- PORTABLE-Urgent (Xray Chest 1 View- PORTABLE-Urgent .) (05.04.21 @ 01:48) >    FINDINGS: Midline tracheostomy. No change in position of left-sided pleural space drainage catheter. Since prior there is no significant interval change in volume of left-sided pleural air. Airspace opacities/consolidation is identified throughout the bilateral lung parenchyma, without significant interval change. No pleural effusion. No mediastinal shift. No acute osseous fractures.    < end of copied text >  ***    GOALS OF CARE DISCUSSION WITH PATIENT/FAMILY/PROXY:    CRITICAL CARE TIME SPENT: 35 minutes

## 2021-05-05 NOTE — PROGRESS NOTE ADULT - ASSESSMENT
56 y/o s/p trach failed PEG 4/21, L pigtail placed 4/18. Persistent left pneumothorax, pigtail to suction without airleak.   -Recommend CT Chest to further evaluate, possible trapped lung, poss fibrosis  -F/u CT   -Rec poss IR placement of anterior chest tube  -Continue pigtail to suction  -Daily CXR  -Continue care as per ICU team   -Discussed with Dr. Lanier who agrees

## 2021-05-05 NOTE — PROGRESS NOTE ADULT - SUBJECTIVE AND OBJECTIVE BOX
Patient is a 55y old  Male who presents with a chief complaint of SOB (04 May 2021 10:45)    pt seen in icu [ x ], reg med floor [   ], bed [ x ], chair at bedside [   ], awake and responsive [ x ], mildly sedated, [  ],    nad [x  ]        Allergies    No Known Allergies        Vitals    T(F): 99.5 (05-05-21 @ 04:00), Max: 100.9 (05-04-21 @ 20:00)  HR: 81 (05-05-21 @ 06:00) (76 - 159)  BP: 104/60 (05-05-21 @ 06:00) (87/48 - 167/94)  RR: 24 (05-05-21 @ 06:00) (14 - 46)  SpO2: 97% (05-05-21 @ 06:00) (85% - 100%)  Wt(kg): --  CAPILLARY BLOOD GLUCOSE      POCT Blood Glucose.: 105 mg/dL (05 May 2021 05:54)      Labs                          7.4    9.85  )-----------( 309      ( 05 May 2021 03:47 )             23.4       05-05    141  |  102  |  14  ----------------------------<  99  3.7   |  38<H>  |  0.21<L>    Ca    8.0<L>      05 May 2021 03:47  Phos  2.0     05-05  Mg     2.0     05-05    TPro  5.4<L>  /  Alb  1.8<L>  /  TBili  0.6  /  DBili  x   /  AST  21  /  ALT  24  /  AlkPhos  74  05-05            .Sputum Sputum  04-16 @ 04:42   Moderate Enterobacter aerogenes (Carbapenem Resistant)  Normal Respiratory Monserrat present  --  Enterobacter aerogenes (Carbapenem Resistant)      .Urine Clean Catch (Midstream)  03-15 @ 00:52   No growth  --  --          Radiology Results      Meds    MEDICATIONS  (STANDING):  ascorbic acid 1000 milliGRAM(s) Oral daily  BACItracin   Ointment 1 Application(s) Topical two times a day  chlorhexidine 0.12% Liquid 15 milliLiter(s) Oral Mucosa every 12 hours  chlorhexidine 2% Cloths 1 Application(s) Topical <User Schedule>  dexMEDEtomidine Infusion 0.2 MICROgram(s)/kG/Hr (4.2 mL/Hr) IV Continuous <Continuous>  fentaNYL   Patch  50 MICROgram(s)/Hr. 1 Patch Transdermal every 48 hours  HYDROmorphone  Injectable 1 milliGRAM(s) IV Push every 8 hours  insulin lispro (ADMELOG) corrective regimen sliding scale   SubCutaneous every 6 hours  methadone    Tablet 5 milliGRAM(s) Oral two times a day  OLANZapine 5 milliGRAM(s) Oral two times a day  pantoprazole  Injectable 40 milliGRAM(s) IV Push every 12 hours  polyethylene glycol 3350 17 Gram(s) Oral two times a day  predniSONE   Tablet 40 milliGRAM(s) Oral daily  senna 2 Tablet(s) Oral at bedtime      MEDICATIONS  (PRN):  ALBUTerol    90 MICROgram(s) HFA Inhaler 2 Puff(s) Inhalation every 6 hours PRN Shortness of Breath and/or Wheezing  artificial  tears Solution 1 Drop(s) Both EYES every 4 hours PRN Dry Eyes  LORazepam   Injectable 4 milliGRAM(s) IV Push every 6 hours PRN Agitation  sodium chloride 0.9% lock flush 10 milliLiter(s) IV Push every 1 hour PRN Pre/post blood products, medications, blood draw, and to maintain line patency      Physical Exam    Neuro :  no focal deficits  Respiratory: CTA B/L  CV: RRR, S1S2, no murmurs,   Abdominal: Soft, NT, ND +BS, g- tube in place, dressing cdi  Extremities: b/l ue edema, + peripheral pulses      ASSESSMENT    Hypoxemia 2nd to covid pna   transaminitis  prediabetes  h/o appendectomy  cholecystectomy        PLAN    contact and airborne isolation  d/c remdesevir given covid ab positive noted   completed dexamethasone   started pulse steroids for 3 days - 250mg solumedrol bid now tapered off 4/14/21   cont prednisone 40mg daily  cont asa, vit c,    cont albuterol inhaler   pulm f/u  procalcitonin, D-dimer, crp, ldh, ferritin, lactate noted ,    tmx 99.1   cont tylenol prn,   cont robitussin prn   pt on precedex drip and propofol   pt on fentanyl patch  off pressors   s/p intubation 3/29/21   s/p tracheostomy 4/21/21  O2 sat (91% - 100%) mech vent 60%  O2 via mech vent and taper fio2 as tolerated   vent mgmt as per icu  xray 3/19/21 with pneumomediastinum  rept cxr with New trace right apical pneumothorax. New mild left apical pneumothorax. Grossly stable small pneumomediastinum.  Soft tissue emphysema at the neck bases bilaterally. Grossly stable bilateral pulmonary infiltrates noted.   cxr 2/24 with No evidence of pneumothorax can be appreciated on the available image. This may be related to patient positioning. Evidence of pneumomediastinum and subcutaneous emphysema in the lower neck is again noted. There are patchy bibasilar infiltrates and elevated right hemidiaphragm noted.   cxr 3/29/21 with No significant change bilateral infiltrates. There is a small simple left apical pneumothorax. No significant pleural effusion. Bilateral subcutaneous emphysema similar to prior.   XRs on 7AM and 5PM with no obvious increase of ptx  cxr 4/4/21 with Improving bilateral airspace disease noted    cxr 4/8/21 with Small left pneumothorax noted.  thoracic surg f/u   s/p L chest tube replacement 4/18/21 for recurrence of left pneumothorax   cxr 4/20/21 with Unchanged advanced infiltrates and catheter left chest tube noted   CXR 4/11/21 with : No interval change compared to one day prior. No pneumothorax noted.   unable to flush or aspirate tube fully, noted debris in tubing which was milked out.   Once material removed from tubing able to aspirated and flush fully. Also changed dressing on pigtail which appears to be in good position.   Pigtail not kinked or clamped.   Discussed with ICU, recommend repeating CXR to assess if PTX improved after flushing pigtail.   Flush pigtail PRN.  Daily CXR  Monitor O2 status   s/p tracheostomy 4/21/21   stay sutures out 2 weeks from OR date  cxr 4/21/21 with No evidence of active chest disease. Tracheostomy tube in place otherwise no significant change noted   cxr 4/25/21 with A 40% LEFT pneumothorax. LEFT multi-sidehole pigtail catheter overlies LEFT lower hemithorax.. Bilateral multifocal and diffuse ill-defined airspace opacities..  Follow-up AP portable chest radiograph 4/25/2021 AT 8:58 AM: Residual LEFT 30% upper zone pneumothorax. Otherwise no interval change.noted    cxr 4/30/21 Tracheostomy tube again noted. Left chest tube noted with small left apical pneumothorax unchanged. Bilateral airspace opacities overall worsening. Heart size cannot be accurately assessed in this projection noted.   cxr 5/3/21 with Large left pneumothorax noted above.  s/p surgical placement of gastrostomy tube 4/23/2021.   cont on tube feeding  id f/u   No need for further antibiotics as patient completed course of Meropenem  leukocytosis resolved  speutum cx with Enterobacter aerogenes (Carbapenem Resistant) noted above  andrea   lispro ss   prognosis poor   s/p 4 units prbc for anemia  f/u h/h    heme onc f/u  retic is elevated.    Haptoglobin normal  had dropped but now stable again  ? daily phlebotomy  no hemolysis, Fe/B12/folate adequate  hemolysis is unlikely even his baseline haptoglobin may be very elevated due to COVID  direct pamella neg noted above   COVID is known to cause cold agglutinin  cont current meds  mgmt as per icu          Patient is a 55y old  Male who presents with a chief complaint of SOB (04 May 2021 10:45)    pt seen in icu [ x ], reg med floor [   ], bed [ x ], chair at bedside [   ], awake and responsive [ x ], mildly sedated, [  ],    nad [x  ]        Allergies    No Known Allergies        Vitals    T(F): 99.5 (05-05-21 @ 04:00), Max: 100.9 (05-04-21 @ 20:00)  HR: 81 (05-05-21 @ 06:00) (76 - 159)  BP: 104/60 (05-05-21 @ 06:00) (87/48 - 167/94)  RR: 24 (05-05-21 @ 06:00) (14 - 46)  SpO2: 97% (05-05-21 @ 06:00) (85% - 100%)  Wt(kg): --  CAPILLARY BLOOD GLUCOSE      POCT Blood Glucose.: 105 mg/dL (05 May 2021 05:54)      Labs                          7.4    9.85  )-----------( 309      ( 05 May 2021 03:47 )             23.4       05-05    141  |  102  |  14  ----------------------------<  99  3.7   |  38<H>  |  0.21<L>    Ca    8.0<L>      05 May 2021 03:47  Phos  2.0     05-05  Mg     2.0     05-05    TPro  5.4<L>  /  Alb  1.8<L>  /  TBili  0.6  /  DBili  x   /  AST  21  /  ALT  24  /  AlkPhos  74  05-05            .Sputum Sputum  04-16 @ 04:42   Moderate Enterobacter aerogenes (Carbapenem Resistant)  Normal Respiratory Monserrat present  --  Enterobacter aerogenes (Carbapenem Resistant)      .Urine Clean Catch (Midstream)  03-15 @ 00:52   No growth  --  --          Radiology Results    < from: Xray Chest 1 View- PORTABLE-Urgent (Xray Chest 1 View- PORTABLE-Urgent .) (05.04.21 @ 01:48) >  FINDINGS: Midline tracheostomy. No change in position of left-sided pleural space drainage catheter. Since prior there is no significant interval change in volume of left-sided pleural air. Airspace opacities/consolidation is identified throughout the bilateral lung parenchyma, without significant interval change. No pleural effusion. No mediastinal shift. No acute osseous fractures.    IMPRESSION: No significant interval change.    < end of copied text >        Meds    MEDICATIONS  (STANDING):  ascorbic acid 1000 milliGRAM(s) Oral daily  BACItracin   Ointment 1 Application(s) Topical two times a day  chlorhexidine 0.12% Liquid 15 milliLiter(s) Oral Mucosa every 12 hours  chlorhexidine 2% Cloths 1 Application(s) Topical <User Schedule>  dexMEDEtomidine Infusion 0.2 MICROgram(s)/kG/Hr (4.2 mL/Hr) IV Continuous <Continuous>  fentaNYL   Patch  50 MICROgram(s)/Hr. 1 Patch Transdermal every 48 hours  HYDROmorphone  Injectable 1 milliGRAM(s) IV Push every 8 hours  insulin lispro (ADMELOG) corrective regimen sliding scale   SubCutaneous every 6 hours  methadone    Tablet 5 milliGRAM(s) Oral two times a day  OLANZapine 5 milliGRAM(s) Oral two times a day  pantoprazole  Injectable 40 milliGRAM(s) IV Push every 12 hours  polyethylene glycol 3350 17 Gram(s) Oral two times a day  predniSONE   Tablet 40 milliGRAM(s) Oral daily  senna 2 Tablet(s) Oral at bedtime      MEDICATIONS  (PRN):  ALBUTerol    90 MICROgram(s) HFA Inhaler 2 Puff(s) Inhalation every 6 hours PRN Shortness of Breath and/or Wheezing  artificial  tears Solution 1 Drop(s) Both EYES every 4 hours PRN Dry Eyes  LORazepam   Injectable 4 milliGRAM(s) IV Push every 6 hours PRN Agitation  sodium chloride 0.9% lock flush 10 milliLiter(s) IV Push every 1 hour PRN Pre/post blood products, medications, blood draw, and to maintain line patency      Physical Exam    Neuro :  no focal deficits  Respiratory: CTA B/L  CV: RRR, S1S2, no murmurs,   Abdominal: Soft, NT, ND +BS, g- tube in place, dressing cdi  Extremities: b/l ue edema, + peripheral pulses      ASSESSMENT    Hypoxemia 2nd to covid pna   transaminitis  prediabetes  h/o appendectomy  cholecystectomy        PLAN    contact and airborne isolation  d/c remdesevir given covid ab positive noted   completed dexamethasone   started pulse steroids for 3 days - 250mg solumedrol bid now tapered off 4/14/21   cont prednisone 40mg daily  cont asa, vit c,    cont albuterol inhaler   pulm f/u  procalcitonin, D-dimer, crp, ldh, ferritin, lactate noted ,    tmx 100.9   cont tylenol prn,   cont robitussin prn   pt on precedex drip and propofol   pt on fentanyl patch  off pressors   s/p intubation 3/29/21   s/p tracheostomy 4/21/21  O2 sat (85% - 100%) mech vent 40%  O2 via mech vent and taper fio2 as tolerated   vent mgmt as per icu  xray 3/19/21 with pneumomediastinum  rept cxr with New trace right apical pneumothorax. New mild left apical pneumothorax. Grossly stable small pneumomediastinum.  Soft tissue emphysema at the neck bases bilaterally. Grossly stable bilateral pulmonary infiltrates noted.   cxr 2/24 with No evidence of pneumothorax can be appreciated on the available image. This may be related to patient positioning. Evidence of pneumomediastinum and subcutaneous emphysema in the lower neck is again noted. There are patchy bibasilar infiltrates and elevated right hemidiaphragm noted.   cxr 3/29/21 with No significant change bilateral infiltrates. There is a small simple left apical pneumothorax. No significant pleural effusion. Bilateral subcutaneous emphysema similar to prior.   XRs on 7AM and 5PM with no obvious increase of ptx  cxr 4/4/21 with Improving bilateral airspace disease noted    cxr 4/8/21 with Small left pneumothorax noted.  thoracic surg f/u   s/p L chest tube replacement 4/18/21 for recurrence of left pneumothorax   cxr 4/20/21 with Unchanged advanced infiltrates and catheter left chest tube noted   CXR 4/11/21 with : No interval change compared to one day prior. No pneumothorax noted.   unable to flush or aspirate tube fully, noted debris in tubing which was milked out.   Once material removed from tubing able to aspirated and flush fully. Also changed dressing on pigtail which appears to be in good position.   Pigtail not kinked or clamped.   Discussed with ICU, recommend repeating CXR to assess if PTX improved after flushing pigtail.   Flush pigtail PRN.  Daily CXR  Monitor O2 status   s/p tracheostomy 4/21/21   stay sutures out 2 weeks from OR date  cxr 4/21/21 with No evidence of active chest disease. Tracheostomy tube in place otherwise no significant change noted   cxr 4/25/21 with A 40% LEFT pneumothorax. LEFT multi-sidehole pigtail catheter overlies LEFT lower hemithorax.. Bilateral multifocal and diffuse ill-defined airspace opacities..  Follow-up AP portable chest radiograph 4/25/2021 AT 8:58 AM: Residual LEFT 30% upper zone pneumothorax. Otherwise no interval change.noted    cxr 4/30/21 Tracheostomy tube again noted. Left chest tube noted with small left apical pneumothorax unchanged. Bilateral airspace opacities overall worsening. Heart size cannot be accurately assessed in this projection noted.   cxr 5/3/21 with Large left pneumothorax noted above.   cxr 5 4/21 with No significant interval change noted above.  s/p surgical placement of gastrostomy tube 4/23/2021.   cont on tube feeding  id f/u   No need for further antibiotics as patient completed course of Meropenem  leukocytosis resolved  speutum cx with Enterobacter aerogenes (Carbapenem Resistant) noted above  andrea   lispro ss   prognosis poor   s/p 4 units prbc for anemia  f/u h/h    check vitamin b12  heme onc f/u  retic is elevated.    Haptoglobin normal  had dropped but now stable again  ? daily phlebotomy  no hemolysis, Fe/B12/folate adequate  hemolysis is unlikely even his baseline haptoglobin may be very elevated due to COVID  direct pamella neg noted above   COVID is known to cause cold agglutinin  cont current meds  mgmt as per icu

## 2021-05-06 LAB
ALBUMIN SERPL ELPH-MCNC: 1.6 G/DL — LOW (ref 3.5–5)
ALP SERPL-CCNC: 72 U/L — SIGNIFICANT CHANGE UP (ref 40–120)
ALT FLD-CCNC: 17 U/L DA — SIGNIFICANT CHANGE UP (ref 10–60)
ANION GAP SERPL CALC-SCNC: 1 MMOL/L — LOW (ref 5–17)
APTT BLD: 32.1 SEC — SIGNIFICANT CHANGE UP (ref 27.5–35.5)
AST SERPL-CCNC: 14 U/L — SIGNIFICANT CHANGE UP (ref 10–40)
BASE EXCESS BLDA CALC-SCNC: 11.1 MMOL/L — HIGH (ref -2–3)
BASOPHILS # BLD AUTO: 0.04 K/UL — SIGNIFICANT CHANGE UP (ref 0–0.2)
BASOPHILS NFR BLD AUTO: 0.4 % — SIGNIFICANT CHANGE UP (ref 0–2)
BILIRUB SERPL-MCNC: 0.7 MG/DL — SIGNIFICANT CHANGE UP (ref 0.2–1.2)
BLD GP AB SCN SERPL QL: SIGNIFICANT CHANGE UP
BLOOD GAS COMMENTS ARTERIAL: SIGNIFICANT CHANGE UP
BUN SERPL-MCNC: 9 MG/DL — SIGNIFICANT CHANGE UP (ref 7–18)
CALCIUM SERPL-MCNC: 7.4 MG/DL — LOW (ref 8.4–10.5)
CHLORIDE SERPL-SCNC: 102 MMOL/L — SIGNIFICANT CHANGE UP (ref 96–108)
CO2 SERPL-SCNC: 37 MMOL/L — HIGH (ref 22–31)
CREAT SERPL-MCNC: <0.2 MG/DL — LOW (ref 0.5–1.3)
EOSINOPHIL # BLD AUTO: 0.47 K/UL — SIGNIFICANT CHANGE UP (ref 0–0.5)
EOSINOPHIL NFR BLD AUTO: 4.4 % — SIGNIFICANT CHANGE UP (ref 0–6)
GLUCOSE BLDC GLUCOMTR-MCNC: 130 MG/DL — HIGH (ref 70–99)
GLUCOSE BLDC GLUCOMTR-MCNC: 146 MG/DL — HIGH (ref 70–99)
GLUCOSE BLDC GLUCOMTR-MCNC: 146 MG/DL — HIGH (ref 70–99)
GLUCOSE BLDC GLUCOMTR-MCNC: 153 MG/DL — HIGH (ref 70–99)
GLUCOSE SERPL-MCNC: 113 MG/DL — HIGH (ref 70–99)
HCO3 BLDA-SCNC: 37 MMOL/L — HIGH (ref 21–28)
HCT VFR BLD CALC: 22.7 % — LOW (ref 39–50)
HGB BLD-MCNC: 7.1 G/DL — LOW (ref 13–17)
HOROWITZ INDEX BLDA+IHG-RTO: 60 — SIGNIFICANT CHANGE UP
IMM GRANULOCYTES NFR BLD AUTO: 0.6 % — SIGNIFICANT CHANGE UP (ref 0–1.5)
INR BLD: 1.3 RATIO — HIGH (ref 0.88–1.16)
LYMPHOCYTES # BLD AUTO: 1.32 K/UL — SIGNIFICANT CHANGE UP (ref 1–3.3)
LYMPHOCYTES # BLD AUTO: 12.3 % — LOW (ref 13–44)
MAGNESIUM SERPL-MCNC: 1.7 MG/DL — SIGNIFICANT CHANGE UP (ref 1.6–2.6)
MCHC RBC-ENTMCNC: 31.3 GM/DL — LOW (ref 32–36)
MCHC RBC-ENTMCNC: 31.7 PG — SIGNIFICANT CHANGE UP (ref 27–34)
MCV RBC AUTO: 101.3 FL — HIGH (ref 80–100)
MONOCYTES # BLD AUTO: 0.78 K/UL — SIGNIFICANT CHANGE UP (ref 0–0.9)
MONOCYTES NFR BLD AUTO: 7.3 % — SIGNIFICANT CHANGE UP (ref 2–14)
NEUTROPHILS # BLD AUTO: 8.06 K/UL — HIGH (ref 1.8–7.4)
NEUTROPHILS NFR BLD AUTO: 75 % — SIGNIFICANT CHANGE UP (ref 43–77)
NRBC # BLD: 0 /100 WBCS — SIGNIFICANT CHANGE UP (ref 0–0)
PCO2 BLDA: 55 MMHG — HIGH (ref 35–48)
PH BLDA: 7.44 — SIGNIFICANT CHANGE UP (ref 7.35–7.45)
PHOSPHATE SERPL-MCNC: 3.1 MG/DL — SIGNIFICANT CHANGE UP (ref 2.5–4.5)
PLATELET # BLD AUTO: 277 K/UL — SIGNIFICANT CHANGE UP (ref 150–400)
PO2 BLDA: 110 MMHG — HIGH (ref 83–108)
POTASSIUM SERPL-MCNC: 3.4 MMOL/L — LOW (ref 3.5–5.3)
POTASSIUM SERPL-SCNC: 3.4 MMOL/L — LOW (ref 3.5–5.3)
PROT SERPL-MCNC: 4.9 G/DL — LOW (ref 6–8.3)
PROTHROM AB SERPL-ACNC: 15.3 SEC — HIGH (ref 10.6–13.6)
RBC # BLD: 2.24 M/UL — LOW (ref 4.2–5.8)
RBC # FLD: 16.5 % — HIGH (ref 10.3–14.5)
SAO2 % BLDA: 99 % — SIGNIFICANT CHANGE UP
SARS-COV-2 RNA SPEC QL NAA+PROBE: SIGNIFICANT CHANGE UP
SODIUM SERPL-SCNC: 140 MMOL/L — SIGNIFICANT CHANGE UP (ref 135–145)
VIT B12 SERPL-MCNC: 983 PG/ML — SIGNIFICANT CHANGE UP (ref 232–1245)
WBC # BLD: 10.73 K/UL — HIGH (ref 3.8–10.5)
WBC # FLD AUTO: 10.73 K/UL — HIGH (ref 3.8–10.5)

## 2021-05-06 PROCEDURE — 71045 X-RAY EXAM CHEST 1 VIEW: CPT | Mod: 26,77

## 2021-05-06 PROCEDURE — 71045 X-RAY EXAM CHEST 1 VIEW: CPT | Mod: 26,76

## 2021-05-06 RX ORDER — SODIUM CHLORIDE 9 MG/ML
500 INJECTION INTRAMUSCULAR; INTRAVENOUS; SUBCUTANEOUS ONCE
Refills: 0 | Status: COMPLETED | OUTPATIENT
Start: 2021-05-06 | End: 2021-05-06

## 2021-05-06 RX ORDER — ALBUMIN HUMAN 25 %
50 VIAL (ML) INTRAVENOUS EVERY 6 HOURS
Refills: 0 | Status: COMPLETED | OUTPATIENT
Start: 2021-05-06 | End: 2021-05-07

## 2021-05-06 RX ORDER — FENTANYL CITRATE 50 UG/ML
1 INJECTION INTRAVENOUS
Refills: 0 | Status: DISCONTINUED | OUTPATIENT
Start: 2021-05-06 | End: 2021-05-06

## 2021-05-06 RX ORDER — KETAMINE HYDROCHLORIDE 100 MG/ML
20 INJECTION INTRAMUSCULAR; INTRAVENOUS ONCE
Refills: 0 | Status: DISCONTINUED | OUTPATIENT
Start: 2021-05-06 | End: 2021-05-06

## 2021-05-06 RX ORDER — MIDAZOLAM HYDROCHLORIDE 1 MG/ML
4 INJECTION, SOLUTION INTRAMUSCULAR; INTRAVENOUS ONCE
Refills: 0 | Status: DISCONTINUED | OUTPATIENT
Start: 2021-05-06 | End: 2021-05-06

## 2021-05-06 RX ORDER — SODIUM CHLORIDE 9 MG/ML
10 INJECTION INTRAMUSCULAR; INTRAVENOUS; SUBCUTANEOUS
Refills: 0 | Status: DISCONTINUED | OUTPATIENT
Start: 2021-05-06 | End: 2021-06-11

## 2021-05-06 RX ORDER — POTASSIUM CHLORIDE 20 MEQ
10 PACKET (EA) ORAL
Refills: 0 | Status: DISCONTINUED | OUTPATIENT
Start: 2021-05-06 | End: 2021-05-06

## 2021-05-06 RX ORDER — POTASSIUM CHLORIDE 20 MEQ
40 PACKET (EA) ORAL
Refills: 0 | Status: COMPLETED | OUTPATIENT
Start: 2021-05-06 | End: 2021-05-06

## 2021-05-06 RX ORDER — FUROSEMIDE 40 MG
40 TABLET ORAL ONCE
Refills: 0 | Status: COMPLETED | OUTPATIENT
Start: 2021-05-06 | End: 2021-05-06

## 2021-05-06 RX ORDER — TAMSULOSIN HYDROCHLORIDE 0.4 MG/1
0.4 CAPSULE ORAL AT BEDTIME
Refills: 0 | Status: DISCONTINUED | OUTPATIENT
Start: 2021-05-06 | End: 2021-05-08

## 2021-05-06 RX ORDER — METHADONE HYDROCHLORIDE 40 MG/1
10 TABLET ORAL EVERY 8 HOURS
Refills: 0 | Status: DISCONTINUED | OUTPATIENT
Start: 2021-05-06 | End: 2021-05-08

## 2021-05-06 RX ADMIN — FENTANYL CITRATE 1 PATCH: 50 INJECTION INTRAVENOUS at 07:29

## 2021-05-06 RX ADMIN — CHLORHEXIDINE GLUCONATE 1 APPLICATION(S): 213 SOLUTION TOPICAL at 05:20

## 2021-05-06 RX ADMIN — DEXMEDETOMIDINE HYDROCHLORIDE IN 0.9% SODIUM CHLORIDE 4.2 MICROGRAM(S)/KG/HR: 4 INJECTION INTRAVENOUS at 02:15

## 2021-05-06 RX ADMIN — Medication 4 MILLIGRAM(S): at 09:56

## 2021-05-06 RX ADMIN — POLYETHYLENE GLYCOL 3350 17 GRAM(S): 17 POWDER, FOR SOLUTION ORAL at 05:22

## 2021-05-06 RX ADMIN — HYDROMORPHONE HYDROCHLORIDE 1 MILLIGRAM(S): 2 INJECTION INTRAMUSCULAR; INTRAVENOUS; SUBCUTANEOUS at 22:30

## 2021-05-06 RX ADMIN — PANTOPRAZOLE SODIUM 40 MILLIGRAM(S): 20 TABLET, DELAYED RELEASE ORAL at 17:11

## 2021-05-06 RX ADMIN — SODIUM CHLORIDE 1000 MILLILITER(S): 9 INJECTION INTRAMUSCULAR; INTRAVENOUS; SUBCUTANEOUS at 02:15

## 2021-05-06 RX ADMIN — PANTOPRAZOLE SODIUM 40 MILLIGRAM(S): 20 TABLET, DELAYED RELEASE ORAL at 05:19

## 2021-05-06 RX ADMIN — OLANZAPINE 5 MILLIGRAM(S): 15 TABLET, FILM COATED ORAL at 05:22

## 2021-05-06 RX ADMIN — HYDROMORPHONE HYDROCHLORIDE 1 MILLIGRAM(S): 2 INJECTION INTRAMUSCULAR; INTRAVENOUS; SUBCUTANEOUS at 05:19

## 2021-05-06 RX ADMIN — Medication 50 MILLILITER(S): at 09:55

## 2021-05-06 RX ADMIN — Medication 40 MILLIEQUIVALENT(S): at 09:56

## 2021-05-06 RX ADMIN — FENTANYL CITRATE 1 PATCH: 50 INJECTION INTRAVENOUS at 05:13

## 2021-05-06 RX ADMIN — HYDROMORPHONE HYDROCHLORIDE 1 MILLIGRAM(S): 2 INJECTION INTRAMUSCULAR; INTRAVENOUS; SUBCUTANEOUS at 14:27

## 2021-05-06 RX ADMIN — Medication 1: at 17:10

## 2021-05-06 RX ADMIN — Medication 1 APPLICATION(S): at 05:19

## 2021-05-06 RX ADMIN — Medication 1 MILLIGRAM(S): at 05:20

## 2021-05-06 RX ADMIN — FENTANYL CITRATE 1 PATCH: 50 INJECTION INTRAVENOUS at 05:21

## 2021-05-06 RX ADMIN — Medication 1 MILLIGRAM(S): at 14:27

## 2021-05-06 RX ADMIN — Medication 50 MILLILITER(S): at 23:02

## 2021-05-06 RX ADMIN — METHADONE HYDROCHLORIDE 10 MILLIGRAM(S): 40 TABLET ORAL at 22:57

## 2021-05-06 RX ADMIN — Medication 1 DROP(S): at 05:20

## 2021-05-06 RX ADMIN — Medication 40 MILLIGRAM(S): at 09:56

## 2021-05-06 RX ADMIN — HYDROMORPHONE HYDROCHLORIDE 1 MILLIGRAM(S): 2 INJECTION INTRAMUSCULAR; INTRAVENOUS; SUBCUTANEOUS at 06:00

## 2021-05-06 RX ADMIN — METHADONE HYDROCHLORIDE 5 MILLIGRAM(S): 40 TABLET ORAL at 05:20

## 2021-05-06 RX ADMIN — DEXMEDETOMIDINE HYDROCHLORIDE IN 0.9% SODIUM CHLORIDE 4.2 MICROGRAM(S)/KG/HR: 4 INJECTION INTRAVENOUS at 14:27

## 2021-05-06 RX ADMIN — POLYETHYLENE GLYCOL 3350 17 GRAM(S): 17 POWDER, FOR SOLUTION ORAL at 17:32

## 2021-05-06 RX ADMIN — HYDROMORPHONE HYDROCHLORIDE 1 MILLIGRAM(S): 2 INJECTION INTRAMUSCULAR; INTRAVENOUS; SUBCUTANEOUS at 14:54

## 2021-05-06 RX ADMIN — OLANZAPINE 5 MILLIGRAM(S): 15 TABLET, FILM COATED ORAL at 17:11

## 2021-05-06 RX ADMIN — METHADONE HYDROCHLORIDE 10 MILLIGRAM(S): 40 TABLET ORAL at 14:27

## 2021-05-06 RX ADMIN — Medication 40 MILLIEQUIVALENT(S): at 08:54

## 2021-05-06 RX ADMIN — FENTANYL CITRATE 1 PATCH: 50 INJECTION INTRAVENOUS at 19:51

## 2021-05-06 RX ADMIN — CHLORHEXIDINE GLUCONATE 15 MILLILITER(S): 213 SOLUTION TOPICAL at 05:20

## 2021-05-06 RX ADMIN — TAMSULOSIN HYDROCHLORIDE 0.4 MILLIGRAM(S): 0.4 CAPSULE ORAL at 22:57

## 2021-05-06 RX ADMIN — CHLORHEXIDINE GLUCONATE 15 MILLILITER(S): 213 SOLUTION TOPICAL at 17:11

## 2021-05-06 RX ADMIN — MIDAZOLAM HYDROCHLORIDE 4 MILLIGRAM(S): 1 INJECTION, SOLUTION INTRAMUSCULAR; INTRAVENOUS at 18:00

## 2021-05-06 RX ADMIN — DEXMEDETOMIDINE HYDROCHLORIDE IN 0.9% SODIUM CHLORIDE 4.2 MICROGRAM(S)/KG/HR: 4 INJECTION INTRAVENOUS at 10:38

## 2021-05-06 RX ADMIN — Medication 1000 MILLIGRAM(S): at 11:31

## 2021-05-06 RX ADMIN — KETAMINE HYDROCHLORIDE 20 MILLIGRAM(S): 100 INJECTION INTRAMUSCULAR; INTRAVENOUS at 10:37

## 2021-05-06 RX ADMIN — SODIUM CHLORIDE 500 MILLILITER(S): 9 INJECTION INTRAMUSCULAR; INTRAVENOUS; SUBCUTANEOUS at 04:27

## 2021-05-06 RX ADMIN — Medication 1 MILLIGRAM(S): at 22:57

## 2021-05-06 RX ADMIN — Medication 1 APPLICATION(S): at 17:11

## 2021-05-06 RX ADMIN — Medication 50 MILLILITER(S): at 17:10

## 2021-05-06 RX ADMIN — Medication 40 MILLIGRAM(S): at 05:20

## 2021-05-06 NOTE — PROGRESS NOTE ADULT - PROBLEM SELECTOR PLAN 3
Ct chest  noted  Thoracic sx follow up  Needs another anterior pig tail tube by IR on left  Daily  CXR   Management per ICU

## 2021-05-06 NOTE — PROGRESS NOTE ADULT - SUBJECTIVE AND OBJECTIVE BOX
Patient is a 55y old  Male who presents with a chief complaint of SOB (06 May 2021 08:42)  Patient is awake, not alert. Remains on ventilator via trach    INTERVAL HPI/OVERNIGHT EVENTS:      VITAL SIGNS:  T(F): 99.1 (05-06-21 @ 07:00)  HR: 97 (05-06-21 @ 09:00)  BP: 143/64 (05-06-21 @ 09:00)  RR: 23 (05-06-21 @ 09:00)  SpO2: 100% (05-06-21 @ 09:00)  Wt(kg): --  I&O's Detail    05 May 2021 07:01  -  06 May 2021 07:00  --------------------------------------------------------  IN:    Dexmedetomidine: 544.8 mL    Enteral Tube Flush: 100 mL  Total IN: 644.8 mL    OUT:    Chest Tube (mL): 110 mL    Incontinent per Condom Catheter (mL): 1000 mL    Indwelling Catheter - Urethral (mL): 595 mL    Jevity 1.5: 0 mL    Osmolite 1.2: 0 mL  Total OUT: 1705 mL    Total NET: -1060.2 mL      06 May 2021 07:01  -  06 May 2021 10:58  --------------------------------------------------------  IN:    Dexmedetomidine: 56 mL    Osmolite 1.2: 10 mL  Total IN: 66 mL    OUT:    Indwelling Catheter - Urethral (mL): 650 mL  Total OUT: 650 mL    Total NET: -584 mL        Mode: AC/ CMV (Assist Control/ Continuous Mandatory Ventilation)  RR (machine): 16  TV (machine): 400  FiO2: 60  PEEP: 5  ITime: 0.7  MAP: 16  PIP: 41        REVIEW OF SYSTEMS:    CONSTITUTIONAL:  No fevers, chills, sweats    HEENT:  Eyes:  No diplopia or blurred vision. ENT:  No earache, sore throat or runny nose.    CARDIOVASCULAR:  No pressure, squeezing, tightness, or heaviness about the chest; no palpitations.    RESPIRATORY:  Per HPI    GASTROINTESTINAL:  No abdominal pain, nausea, vomiting or diarrhea.    GENITOURINARY:  No dysuria, frequency or urgency.    NEUROLOGIC:  No paresthesias, fasciculations, seizures or weakness.    PSYCHIATRIC:  No disorder of thought or mood.      PHYSICAL EXAM:    Constitutional: Well developed and nourished  Eyes:Perrla  ENMT: normal  Neck:supple  Respiratory: good air entry  Cardiovascular: S1 S2 regular  Gastrointestinal: Soft, Non tender  Extremities: + edema  Vascular:normal  Neurological:Awake, not alert  Musculoskeletal:Normal      MEDICATIONS  (STANDING):  albumin human 25% IVPB 50 milliLiter(s) IV Intermittent every 6 hours  ascorbic acid 1000 milliGRAM(s) Oral daily  BACItracin   Ointment 1 Application(s) Topical two times a day  chlorhexidine 0.12% Liquid 15 milliLiter(s) Oral Mucosa every 12 hours  chlorhexidine 2% Cloths 1 Application(s) Topical <User Schedule>  clonazePAM  Tablet 1 milliGRAM(s) Oral every 8 hours  dexMEDEtomidine Infusion 0.2 MICROgram(s)/kG/Hr (4.2 mL/Hr) IV Continuous <Continuous>  enoxaparin Injectable 40 milliGRAM(s) SubCutaneous every 24 hours  HYDROmorphone  Injectable 1 milliGRAM(s) IV Push every 8 hours  insulin lispro (ADMELOG) corrective regimen sliding scale   SubCutaneous every 6 hours  methadone    Tablet 10 milliGRAM(s) Oral every 8 hours  OLANZapine 5 milliGRAM(s) Oral two times a day  pantoprazole  Injectable 40 milliGRAM(s) IV Push every 12 hours  polyethylene glycol 3350 17 Gram(s) Oral two times a day  predniSONE   Tablet 40 milliGRAM(s) Oral daily  senna 2 Tablet(s) Oral at bedtime  tamsulosin 0.4 milliGRAM(s) Oral at bedtime  trimethoprim  160 mG/sulfamethoxazole 800 mG 1 Tablet(s) Oral <User Schedule>    MEDICATIONS  (PRN):  acetaminophen    Suspension .. 650 milliGRAM(s) Oral every 12 hours PRN Temp greater or equal to 38C (100.4F)  ALBUTerol    90 MICROgram(s) HFA Inhaler 2 Puff(s) Inhalation every 6 hours PRN Shortness of Breath and/or Wheezing  artificial  tears Solution 1 Drop(s) Both EYES every 4 hours PRN Dry Eyes  LORazepam   Injectable 4 milliGRAM(s) IV Push every 6 hours PRN Agitation  sodium chloride 0.9% lock flush 10 milliLiter(s) IV Push every 1 hour PRN Pre/post blood products, medications, blood draw, and to maintain line patency      Allergies    No Known Allergies    Intolerances        LABS:                        7.1    10.73 )-----------( 277      ( 06 May 2021 03:45 )             22.7     05-06    140  |  102  |  9   ----------------------------<  113<H>  3.4<L>   |  37<H>  |  <0.20<L>    Ca    7.4<L>      06 May 2021 03:45  Phos  3.1     05-06  Mg     1.7     05-06    TPro  4.9<L>  /  Alb  1.6<L>  /  TBili  0.7  /  DBili  x   /  AST  14  /  ALT  17  /  AlkPhos  72  05-06    PT/INR - ( 06 May 2021 03:45 )   PT: 15.3 sec;   INR: 1.30 ratio         PTT - ( 06 May 2021 03:45 )  PTT:32.1 sec    ABG - ( 06 May 2021 07:58 )  pH, Arterial: 7.44  pH, Blood: x     /  pCO2: 55    /  pO2: 110   / HCO3: 37    / Base Excess: 11.1  /  SaO2: 99                    CAPILLARY BLOOD GLUCOSE      POCT Blood Glucose.: 146 mg/dL (06 May 2021 10:50)  POCT Blood Glucose.: 130 mg/dL (06 May 2021 05:59)  POCT Blood Glucose.: 117 mg/dL (05 May 2021 22:09)  POCT Blood Glucose.: 82 mg/dL (05 May 2021 16:35)  POCT Blood Glucose.: 110 mg/dL (05 May 2021 11:23)    pro-bnp -- 05-04 @ 03:49     d-dimer 2819  05-04 @ 03:49      RADIOLOGY & ADDITIONAL TESTS:    CXR:  < from: CT Chest No Cont (05.05.21 @ 17:39) >  IMPRESSION:  1. Extensive chronic appearing consolidative opacities throughout the lungs, most prominent in the left lower lobe and lingula, with bronchiectasis, scarring, and volume loss.  2. Additional scattered peripheral coarse opacities.  3. Mild left pneumothorax. Left lateral pleural space pigtail catheter, not in continuity with the pneumothorax.  4. Mild bilateral hydronephrosis, similar to appearance on CT of the abdomen and pelvis on April 27.        < end of copied text >    Ct scan chest:    ekg;    echo:

## 2021-05-06 NOTE — PROGRESS NOTE ADULT - ASSESSMENT
56 y/o s/p trach failed PEG 4/21, L pigtail placed 4/18. Persistent left pneumothorax, pigtail to suction without airleak.     - CT chest reviewed   - Recommend consult IR for placement of anterior chest tube while left lateral pigtail taken off suction  - If IR cannot place, thoracic team will place  - Continue left pigtail catheter to suction  - Daily CXR  - Continue care as per ICU team

## 2021-05-06 NOTE — PROGRESS NOTE ADULT - SUBJECTIVE AND OBJECTIVE BOX
Patient is a 55y old  Male who presents with a chief complaint of SOB (05 May 2021 15:09)    pt seen in icu [ x ], reg med floor [   ], bed [ x ], chair at bedside [   ], awake and responsive [ x ], mildly sedated, [  ],    nad [x  ]      Allergies    No Known Allergies        Vitals    T(F): 98.8 (05-06-21 @ 06:00), Max: 101.5 (05-05-21 @ 19:00)  HR: 100 (05-06-21 @ 06:00) (73 - 127)  BP: 135/72 (05-06-21 @ 06:00) (85/37 - 151/73)  RR: 24 (05-06-21 @ 06:00) (18 - 42)  SpO2: 100% (05-06-21 @ 06:00) (91% - 100%)  Wt(kg): --  CAPILLARY BLOOD GLUCOSE      POCT Blood Glucose.: 130 mg/dL (06 May 2021 05:59)      Labs                          7.1    10.73 )-----------( 277      ( 06 May 2021 03:45 )             22.7       05-06    140  |  102  |  9   ----------------------------<  113<H>  3.4<L>   |  37<H>  |  <0.20<L>    Ca    7.4<L>      06 May 2021 03:45  Phos  3.1     05-06  Mg     1.7     05-06    TPro  4.9<L>  /  Alb  1.6<L>  /  TBili  0.7  /  DBili  x   /  AST  14  /  ALT  17  /  AlkPhos  72  05-06            .Sputum Sputum  04-16 @ 04:42   Moderate Enterobacter aerogenes (Carbapenem Resistant)  Normal Respiratory Monserrat present  --  Enterobacter aerogenes (Carbapenem Resistant)      .Urine Clean Catch (Midstream)  03-15 @ 00:52   No growth  --  --          Radiology Results      Meds    MEDICATIONS  (STANDING):  ascorbic acid 1000 milliGRAM(s) Oral daily  BACItracin   Ointment 1 Application(s) Topical two times a day  chlorhexidine 0.12% Liquid 15 milliLiter(s) Oral Mucosa every 12 hours  chlorhexidine 2% Cloths 1 Application(s) Topical <User Schedule>  clonazePAM  Tablet 1 milliGRAM(s) Oral every 8 hours  dexMEDEtomidine Infusion 0.2 MICROgram(s)/kG/Hr (4.2 mL/Hr) IV Continuous <Continuous>  enoxaparin Injectable 40 milliGRAM(s) SubCutaneous every 24 hours  fentaNYL   Patch  50 MICROgram(s)/Hr. 1 Patch Transdermal every 48 hours  HYDROmorphone  Injectable 1 milliGRAM(s) IV Push every 8 hours  insulin lispro (ADMELOG) corrective regimen sliding scale   SubCutaneous every 6 hours  methadone    Tablet 5 milliGRAM(s) Oral two times a day  OLANZapine 5 milliGRAM(s) Oral two times a day  pantoprazole  Injectable 40 milliGRAM(s) IV Push every 12 hours  polyethylene glycol 3350 17 Gram(s) Oral two times a day  predniSONE   Tablet 40 milliGRAM(s) Oral daily  senna 2 Tablet(s) Oral at bedtime  trimethoprim  160 mG/sulfamethoxazole 800 mG 1 Tablet(s) Oral <User Schedule>      MEDICATIONS  (PRN):  acetaminophen    Suspension .. 650 milliGRAM(s) Oral every 12 hours PRN Temp greater or equal to 38C (100.4F)  ALBUTerol    90 MICROgram(s) HFA Inhaler 2 Puff(s) Inhalation every 6 hours PRN Shortness of Breath and/or Wheezing  artificial  tears Solution 1 Drop(s) Both EYES every 4 hours PRN Dry Eyes  LORazepam   Injectable 4 milliGRAM(s) IV Push every 6 hours PRN Agitation  sodium chloride 0.9% lock flush 10 milliLiter(s) IV Push every 1 hour PRN Pre/post blood products, medications, blood draw, and to maintain line patency      Physical Exam    Neuro :  no focal deficits  Respiratory: CTA B/L  CV: RRR, S1S2, no murmurs,   Abdominal: Soft, NT, ND +BS, g- tube in place, dressing cdi  Extremities: b/l ue edema, + peripheral pulses      ASSESSMENT    Hypoxemia 2nd to covid pna   transaminitis  prediabetes  h/o appendectomy  cholecystectomy        PLAN    contact and airborne isolation  d/c remdesevir given covid ab positive noted   completed dexamethasone   started pulse steroids for 3 days - 250mg solumedrol bid now tapered off 4/14/21   cont prednisone 40mg daily  cont asa, vit c,    cont albuterol inhaler   pulm f/u  procalcitonin, D-dimer, crp, ldh, ferritin, lactate noted ,    tmx 100.9   cont tylenol prn,   cont robitussin prn   pt on precedex drip and propofol   pt on fentanyl patch  off pressors   s/p intubation 3/29/21   s/p tracheostomy 4/21/21  O2 sat (85% - 100%) mech vent 40%  O2 via mech vent and taper fio2 as tolerated   vent mgmt as per icu  xray 3/19/21 with pneumomediastinum  rept cxr with New trace right apical pneumothorax. New mild left apical pneumothorax. Grossly stable small pneumomediastinum.  Soft tissue emphysema at the neck bases bilaterally. Grossly stable bilateral pulmonary infiltrates noted.   cxr 2/24 with No evidence of pneumothorax can be appreciated on the available image. This may be related to patient positioning. Evidence of pneumomediastinum and subcutaneous emphysema in the lower neck is again noted. There are patchy bibasilar infiltrates and elevated right hemidiaphragm noted.   cxr 3/29/21 with No significant change bilateral infiltrates. There is a small simple left apical pneumothorax. No significant pleural effusion. Bilateral subcutaneous emphysema similar to prior.   XRs on 7AM and 5PM with no obvious increase of ptx  cxr 4/4/21 with Improving bilateral airspace disease noted    cxr 4/8/21 with Small left pneumothorax noted.  thoracic surg f/u   s/p L chest tube replacement 4/18/21 for recurrence of left pneumothorax   cxr 4/20/21 with Unchanged advanced infiltrates and catheter left chest tube noted   CXR 4/11/21 with : No interval change compared to one day prior. No pneumothorax noted.   unable to flush or aspirate tube fully, noted debris in tubing which was milked out.   Once material removed from tubing able to aspirated and flush fully. Also changed dressing on pigtail which appears to be in good position.   Pigtail not kinked or clamped.   Discussed with ICU, recommend repeating CXR to assess if PTX improved after flushing pigtail.   Flush pigtail PRN.  Daily CXR  Monitor O2 status   s/p tracheostomy 4/21/21   stay sutures out 2 weeks from OR date  cxr 4/21/21 with No evidence of active chest disease. Tracheostomy tube in place otherwise no significant change noted   cxr 4/25/21 with A 40% LEFT pneumothorax. LEFT multi-sidehole pigtail catheter overlies LEFT lower hemithorax.. Bilateral multifocal and diffuse ill-defined airspace opacities..  Follow-up AP portable chest radiograph 4/25/2021 AT 8:58 AM: Residual LEFT 30% upper zone pneumothorax. Otherwise no interval change.noted    cxr 4/30/21 Tracheostomy tube again noted. Left chest tube noted with small left apical pneumothorax unchanged. Bilateral airspace opacities overall worsening. Heart size cannot be accurately assessed in this projection noted.   cxr 5/3/21 with Large left pneumothorax noted above.   cxr 5 4/21 with No significant interval change noted above.  s/p surgical placement of gastrostomy tube 4/23/2021.   cont on tube feeding  id f/u   No need for further antibiotics as patient completed course of Meropenem  leukocytosis resolved  speutum cx with Enterobacter aerogenes (Carbapenem Resistant) noted above  andrea   lispro ss   prognosis poor   s/p 4 units prbc for anemia  f/u h/h    check vitamin b12  heme onc f/u  retic is elevated.    Haptoglobin normal  had dropped but now stable again  ? daily phlebotomy  no hemolysis, Fe/B12/folate adequate  hemolysis is unlikely even his baseline haptoglobin may be very elevated due to COVID  direct pamella neg noted above   COVID is known to cause cold agglutinin  cont current meds  mgmt as per icu        Patient is a 55y old  Male who presents with a chief complaint of SOB (05 May 2021 15:09)    pt seen in icu [ x ], reg med floor [   ], bed [ x ], chair at bedside [   ], awake and responsive [ x ], mildly sedated, [  ],    nad [x  ]      Allergies    No Known Allergies        Vitals    T(F): 98.8 (05-06-21 @ 06:00), Max: 101.5 (05-05-21 @ 19:00)  HR: 100 (05-06-21 @ 06:00) (73 - 127)  BP: 135/72 (05-06-21 @ 06:00) (85/37 - 151/73)  RR: 24 (05-06-21 @ 06:00) (18 - 42)  SpO2: 100% (05-06-21 @ 06:00) (91% - 100%)  Wt(kg): --  CAPILLARY BLOOD GLUCOSE      POCT Blood Glucose.: 130 mg/dL (06 May 2021 05:59)      Labs                          7.1    10.73 )-----------( 277      ( 06 May 2021 03:45 )             22.7       05-06    140  |  102  |  9   ----------------------------<  113<H>  3.4<L>   |  37<H>  |  <0.20<L>    Ca    7.4<L>      06 May 2021 03:45  Phos  3.1     05-06  Mg     1.7     05-06    TPro  4.9<L>  /  Alb  1.6<L>  /  TBili  0.7  /  DBili  x   /  AST  14  /  ALT  17  /  AlkPhos  72  05-06            .Sputum Sputum  04-16 @ 04:42   Moderate Enterobacter aerogenes (Carbapenem Resistant)  Normal Respiratory Monserrat present  --  Enterobacter aerogenes (Carbapenem Resistant)      .Urine Clean Catch (Midstream)  03-15 @ 00:52   No growth  --  --          Radiology Results      < from: CT Chest No Cont (05.05.21 @ 17:39) >  IMPRESSION:  1. Extensive chronic appearing consolidative opacities throughout the lungs, most prominent in the left lower lobe and lingula, with bronchiectasis, scarring, and volume loss.  2. Additional scattered peripheral coarse opacities.  3. Mild left pneumothorax. Left lateral pleural space pigtail catheter, not in continuity with the pneumothorax.  4. Mild bilateral hydronephrosis, similar to appearance on CT of the abdomen and pelvis on April 27.    < end of copied text >      Meds    MEDICATIONS  (STANDING):  ascorbic acid 1000 milliGRAM(s) Oral daily  BACItracin   Ointment 1 Application(s) Topical two times a day  chlorhexidine 0.12% Liquid 15 milliLiter(s) Oral Mucosa every 12 hours  chlorhexidine 2% Cloths 1 Application(s) Topical <User Schedule>  clonazePAM  Tablet 1 milliGRAM(s) Oral every 8 hours  dexMEDEtomidine Infusion 0.2 MICROgram(s)/kG/Hr (4.2 mL/Hr) IV Continuous <Continuous>  enoxaparin Injectable 40 milliGRAM(s) SubCutaneous every 24 hours  fentaNYL   Patch  50 MICROgram(s)/Hr. 1 Patch Transdermal every 48 hours  HYDROmorphone  Injectable 1 milliGRAM(s) IV Push every 8 hours  insulin lispro (ADMELOG) corrective regimen sliding scale   SubCutaneous every 6 hours  methadone    Tablet 5 milliGRAM(s) Oral two times a day  OLANZapine 5 milliGRAM(s) Oral two times a day  pantoprazole  Injectable 40 milliGRAM(s) IV Push every 12 hours  polyethylene glycol 3350 17 Gram(s) Oral two times a day  predniSONE   Tablet 40 milliGRAM(s) Oral daily  senna 2 Tablet(s) Oral at bedtime  trimethoprim  160 mG/sulfamethoxazole 800 mG 1 Tablet(s) Oral <User Schedule>      MEDICATIONS  (PRN):  acetaminophen    Suspension .. 650 milliGRAM(s) Oral every 12 hours PRN Temp greater or equal to 38C (100.4F)  ALBUTerol    90 MICROgram(s) HFA Inhaler 2 Puff(s) Inhalation every 6 hours PRN Shortness of Breath and/or Wheezing  artificial  tears Solution 1 Drop(s) Both EYES every 4 hours PRN Dry Eyes  LORazepam   Injectable 4 milliGRAM(s) IV Push every 6 hours PRN Agitation  sodium chloride 0.9% lock flush 10 milliLiter(s) IV Push every 1 hour PRN Pre/post blood products, medications, blood draw, and to maintain line patency      Physical Exam    Neuro :  no focal deficits  Respiratory: CTA B/L  CV: RRR, S1S2, no murmurs,   Abdominal: Soft, NT, ND +BS, g- tube in place, dressing cdi  Extremities: b/l ue edema, + peripheral pulses      ASSESSMENT    Hypoxemia 2nd to covid pna   transaminitis  prediabetes  h/o appendectomy  cholecystectomy        PLAN    contact and airborne isolation  d/c remdesevir given covid ab positive noted   completed dexamethasone   started pulse steroids for 3 days - 250mg solumedrol bid now tapered off 4/14/21   cont prednisone 40mg daily  cont asa, vit c,    cont albuterol inhaler   pulm f/u  procalcitonin, D-dimer, crp, ldh, ferritin, lactate noted ,    tmx 101.5   cont tylenol prn,   cont robitussin prn   pt on precedex drip    pt on fentanyl patch  off pressors   s/p intubation 3/29/21   s/p tracheostomy 4/21/21  O2 sat (91% - 100%) mech vent 40%  O2 via mech vent and taper fio2 as tolerated   vent mgmt as per icu  xray 3/19/21 with pneumomediastinum  rept cxr with New trace right apical pneumothorax. New mild left apical pneumothorax. Grossly stable small pneumomediastinum.  Soft tissue emphysema at the neck bases bilaterally. Grossly stable bilateral pulmonary infiltrates noted.   cxr 2/24 with No evidence of pneumothorax can be appreciated on the available image. This may be related to patient positioning. Evidence of pneumomediastinum and subcutaneous emphysema in the lower neck is again noted. There are patchy bibasilar infiltrates and elevated right hemidiaphragm noted.   cxr 3/29/21 with No significant change bilateral infiltrates. There is a small simple left apical pneumothorax. No significant pleural effusion. Bilateral subcutaneous emphysema similar to prior.   XRs on 7AM and 5PM with no obvious increase of ptx  cxr 4/4/21 with Improving bilateral airspace disease noted    cxr 4/8/21 with Small left pneumothorax noted.  thoracic surg f/u   s/p L chest tube replacement 4/18/21 for recurrence of left pneumothorax   cxr 4/20/21 with Unchanged advanced infiltrates and catheter left chest tube noted   CXR 4/11/21 with : No interval change compared to one day prior. No pneumothorax noted.   unable to flush or aspirate tube fully, noted debris in tubing which was milked out.   Once material removed from tubing able to aspirated and flush fully. Also changed dressing on pigtail which appears to be in good position.   Monitor O2 status   s/p tracheostomy 4/21/21   stay sutures out 2 weeks from OR date  cxr 4/21/21 with No evidence of active chest disease. Tracheostomy tube in place otherwise no significant change noted   cxr 4/25/21 with A 40% LEFT pneumothorax. LEFT multi-sidehole pigtail catheter overlies LEFT lower hemithorax.. Bilateral multifocal and diffuse ill-defined airspace opacities..  Follow-up AP portable chest radiograph 4/25/2021 AT 8:58 AM: Residual LEFT 30% upper zone pneumothorax. Otherwise no interval change.noted    cxr 4/30/21 Tracheostomy tube again noted. Left chest tube noted with small left apical pneumothorax unchanged. Bilateral airspace opacities overall worsening. Heart size cannot be accurately assessed in this projection noted.   cxr 5/3/21 with Large left pneumothorax noted above.   cxr 5 4/21 with No significant interval change noted above.   ct chest with Extensive chronic appearing consolidative opacities throughout the lungs, most prominent in the left lower lobe and lingula, with bronchiectasis, scarring, and volume loss. Additional scattered peripheral coarse opacities.   Mild left pneumothorax. Left lateral pleural space pigtail catheter, not in continuity with the pneumothorax. Mild bilateral hydronephrosis, similar to appearance on CT of the abdomen and pelvis on April 27. noted above  s/p surgical placement of gastrostomy tube 4/23/2021.   cont on tube feeding  id f/u   No need for further antibiotics as patient completed course of Meropenem  leukocytosis resolved  speutum cx with Enterobacter aerogenes (Carbapenem Resistant) noted above  andrea   lispro ss   prognosis poor   s/p 4 units prbc for anemia  f/u h/h    check vitamin b12  heme onc f/u  retic is elevated.    Haptoglobin normal  had dropped but now stable again  ? daily phlebotomy  no hemolysis, Fe/B12/folate adequate  hemolysis is unlikely even his baseline haptoglobin may be very elevated due to COVID  direct pamella neg noted above   COVID is known to cause cold agglutinin  cont current meds  mgmt as per icu

## 2021-05-06 NOTE — PROGRESS NOTE ADULT - ATTENDING COMMENTS
55 yr old  man , non smoker with  moody 1990s presented 3/14 with x9 days worsening cough, subjective fevers, and SOB, with x2-3 days dysuria and central, non-radiating, constant CP. Admitted to medicine unit  for acute hypoxic respiratory failure secondary to pna from covid-19 infection .     Assessment:  1. Acute hypoxic respiratory failure  2. Covid-19 infection   3. Transaminitis  4. Prediabetes  5. Bilateral pneumothorax  6. Septic shock - resolved  7. 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 mechanical ventilation   -Chest tube to suction  -Cont. prednisone 40 mg for possible organizing pneumonia as evidence on CT scan  -Albumin and Lasix for diuresis, aim for negative fluid balance  -Fentanyl path  -Cont. Clonazepam and titrate down Precedex  -methadone for opioid dependence  -PT evaluation  -dvt/gi prophy  -hemodynamic monitoring   -Prognosis is guarded

## 2021-05-06 NOTE — PROCEDURE NOTE - NSPOSTPRCRAD_GEN_A_CORE
post-procedure radiography performed
central line located in the superior vena cava
post-procedure radiography performed
central line located in the superior vena cava
chest tube in correct position
chest tube in correct position

## 2021-05-06 NOTE — PROGRESS NOTE ADULT - ASSESSMENT
Assessment and plan: 56 y/o M with no PMH is admitted to ICU for AHRF 2/2 covid19 pna     1. Acute hypoxic respiratory failure  2. ARDS 2/2 Covid pneumonia  3. Transaminitis  4. Prediabetes  5. Abnormal TSH  6. Pneumothorax    =================== Neuro============================  -Alert and oriented x 3 at baseline   -Intubated and sedated 3/29 on Vent  -Trach 4/22 continues on Vent    -Continue Precedex and off Fentanyl drip, on 1 mcg of precedex, will try to titrate down  -Was started on Methadone 5 mg BID, will slowly titrate down  - fentanyl patch was d/c but later was restarted   -Started on Ativan PRN, Not helping much   -Off versed, d/cd propofol  -Started on klonopin  -CT head unremarkable     ================= Cardiovascular==========================  # Hypotension improving  Off pressors    # TACHYCARDIA: recurrent episodes   -HR in 110's  -Continue monitoring     ================- Pulm=================================  #Acute hypoxic respiratory failure: secondary to Covid19 pneumonia  #ARDS  -Was on HFNC then got intubated 3/29  -D-dimer initially elevated on presentation, trending down    -s/p Nimbex and Epifanio at different occasions for vent synchrony  -s/p Pigtail on left chest, removed on 4/15  -Large Pneumothorax was noted on 4/18 on left side, s/p chest tube placement to suction since 4/27, still has pneumothorax-   -Trach 4/22 continues on Vent   -Remdesivir was discontinued due to positive antibodies   - Finished Dexamethasone   - Prednisone taper Finished  - Covid19 PCR negative now, off isolation   - C/w prednisone 40 daily for possible organizing pneumonia   - Started on bactrim empirically, TIW  - Chest tube was kinked, which was fixed by surgery, pneumothorax improved after that  - vent setting were changed to volume control, as patient was not pulling enough Tv  On volume control setting, 22/400/40/5  -Will do SBt when less agitated      # Bacterial Pneumonia  - Stable infiltrate on CXR ,likely developed Bacterial pneumonia , Finished ABx Zosyn, meropenem, s/p 1 dose of Vancomycin  - MRSA negative from 3/26  - Sputum Cx growing few gram negative rods, CRE  - Secretions from the trach, needs frequent suctions   - Pulm. Dr. Ryan  - ID Dr Anand       #Pneumothorax and pneumomediastinum:   -X-ray chest: remains with left pneumothorax  -Thoracic surgery following  -s/p Chest tube placed 4/8 pigtail to wall suction discontinued on 4/15  -On 4/18 chest tube was placed back on left lung to treat pneumothorax-  chest tube to suction    -CXR 4/26 still shows left lung pneumothorax - CXR 4/29 persistent pneumothorax     -CXR shows persistent PTX  -Spoke to surgery regarding changing to anterior chest tube.      ==================ID===================================  Likely bacterial pneumonia   -leukocytosis resolved  -No more fever, On Tylenol PRN only   -Finished Meropenem  -plan as above     ================= Nephro================================  -Pitting edema to both arms, ecchymosis on right AC, blisters on left arm around brachial area    -on condom cath   -monitor I/Os , good urine output overall  -s/p Albumin x 2 days on 4/10  -Lasix 40mg BID with Albumin 25% Q6 for 48 hours to help with urine output and fluid status.( 4/15) Albumin finished , was D/jennifer on 4/19, urine output decreasing  - Patient can get Lasix as needed   - Metabolic Alkalosis likely due to compensation, improving     =================GI====================================  Transaminitis:   likely secondary to Covid19   - and  on presentation   hepatitis panel -ve  -Improved, now normalized   -continue to monitor LFT    Diet:   S/p NGT (3/29) with tube feed  C/w bowel regimen   G tube 4/23, on tube feeds    Had diarrhea, changed feeding to osmolite  c/w TF      ================ Heme==================================  Elevated d-dimer: likely secondary to Covid19   -D-dimer 423 on admission  -Last D-dimer 1434  - No active bleeding, restarted lovenox daily    Anemia  On 4/26 Hb 8.8 dropped to 6.8, s/p 1 unit of PRBC repeat hb 7.2  On 4/27 Hb 7, s/p 2 units of PRBC hb remains 7  On 4/28 Hb 6.8, s/p 1 unit of PRBC hb 7.5 now  4 PRBC total    CTH and CT abdomen w/o contrast no evidence of bleed    No active signs of bleeding (No hematemesis, melena or hematuria)  F/u hemolysis w/u- negative so far, elevated reticulocytes   Dr Andrade on board   CBC on 5/3 was 7.7  Will Monitor  F/u CBC daily     =================Endocrine===============================  Prediabetes:  -A1c 5.8  -BS controlled  -continue HSS    Abnormal TSH:  -TSH level noted 0.26,   -Repeat TSH 0.37 and Free T4 1.82    ================= Skin/Catheters============================  No rashes. Peripheral IV lines.   Both arms with pitting edema, right AC with ecchymosis and left AC with blisters     - =================Prophylaxis =============================  On Lovenox for DVT   Protonix for GI proph    ==================GOC==================================   FULL CODE Assessment and plan: 54 y/o M with no PMH is admitted to ICU for AHRF 2/2 covid19 pna     1. Acute hypoxic respiratory failure  2. ARDS 2/2 Covid pneumonia  3. Pneumothorax  4. Prediabetes  5. Abnormal TSH    =================== Neuro============================  -Alert and oriented x 3 at baseline   -Intubated and sedated 3/29 on Vent  -Trach 4/22 continues on Vent    -Continue Precedex and off Fentanyl drip, adjusting precedex as per requirement  Patient was agitated today, tachypneic. Will give ketamine  -Was started on [id number] mg BID, will slowly titrate down  - fentanyl patch was d/c but later was restarted   -Started on Ativan PRN, Not helping much   -Off versed, d/cd propofol  -Started on klonopin  -CT head unremarkable     ================= Cardiovascular==========================  # Hypotension improving  Off pressors    # TACHYCARDIA: recurrent episodes   -HR in 110's  -Continue monitoring     ================- Pulm=================================  #Acute hypoxic respiratory failure: secondary to Covid19 pneumonia  #ARDS  -Was on HFNC then got intubated 3/29  -D-dimer initially elevated on presentation, trending down    -s/p Nimbex and Epifanio at different occasions for vent synchrony  -s/p Pigtail on left chest, removed on 4/15  -Large Pneumothorax was noted on 4/18 on left side, s/p chest tube placement to suction since 4/27, still has pneumothorax-   -Trach 4/22 continues on Vent   -Remdesivir was discontinued due to positive antibodies   - Finished Dexamethasone   - Prednisone taper Finished  - Covid19 PCR negative now, off isolation   - C/w prednisone 40 daily for possible organizing pneumonia   - Started on bactrim empirically, TIW  - Chest tube was kinked, which was fixed by surgery, pneumothorax improved after that  - vent setting were changed to volume control, as patient was not pulling enough Tv  On volume control setting, 22/400/40/5  -Will do SBt when less agitated      # Bacterial Pneumonia  - Stable infiltrate on CXR ,likely developed Bacterial pneumonia , Finished ABx Zosyn, meropenem, s/p 1 dose of Vancomycin  - MRSA negative from 3/26  - Sputum Cx growing few gram negative rods, CRE  - Secretions from the trach, needs frequent suctions   - Pulm. Dr. Ryan  - ID Dr Anand       #Pneumothorax and pneumomediastinum:   -X-ray chest: remains with left pneumothorax  -Thoracic surgery following  -s/p Chest tube placed 4/8 pigtail to wall suction discontinued on 4/15  -On 4/18 chest tube was placed back on left lung to treat pneumothorax-  chest tube to suction    -CXR 4/26 still shows left lung pneumothorax - CXR 4/29 persistent pneumothorax     -CXR shows persistent PTX  -Spoke to surgery regarding changing to anterior chest tube.      ==================ID===================================  Likely bacterial pneumonia   -leukocytosis resolved  -No more fever, On Tylenol PRN only   -Finished Meropenem  -plan as above     ================= Nephro================================  -Pitting edema to both arms, ecchymosis on right AC, blisters on left arm around brachial area    -on condom cath   -monitor I/Os , good urine output overall  -s/p Albumin x 2 days on 4/10  -Lasix 40mg BID with Albumin 25% Q6 for 48 hours to help with urine output and fluid status.( 4/15) Albumin finished , was D/jennifer on 4/19, urine output decreasing  - Patient can get Lasix as needed   - Metabolic Alkalosis likely due to compensation, improving     =================GI====================================  Transaminitis:   likely secondary to Covid19   - and  on presentation   hepatitis panel -ve  -Improved, now normalized   -continue to monitor LFT    Diet:   S/p NGT (3/29) with tube feed  C/w bowel regimen   G tube 4/23, on tube feeds    Had diarrhea, changed feeding to osmolite  c/w TF      ================ Heme==================================  Elevated d-dimer: likely secondary to Covid19   -D-dimer 423 on admission  -Last D-dimer 1434  - No active bleeding, restarted lovenox daily    Anemia  On 4/26 Hb 8.8 dropped to 6.8, s/p 1 unit of PRBC repeat hb 7.2  On 4/27 Hb 7, s/p 2 units of PRBC hb remains 7  On 4/28 Hb 6.8, s/p 1 unit of PRBC hb 7.5 now  4 PRBC total    CTH and CT abdomen w/o contrast no evidence of bleed    No active signs of bleeding (No hematemesis, melena or hematuria)  F/u hemolysis w/u- negative so far, elevated reticulocytes   Dr Andrade on board   CBC on 5/3 was 7.7  Will Monitor  F/u CBC daily     =================Endocrine===============================  Prediabetes:  -A1c 5.8  -BS controlled  -continue HSS    Abnormal TSH:  -TSH level noted 0.26,   -Repeat TSH 0.37 and Free T4 1.82    ================= Skin/Catheters============================  No rashes. Peripheral IV lines.   Both arms with pitting edema, right AC with ecchymosis and left AC with blisters     - =================Prophylaxis =============================  On Lovenox for DVT   Protonix for GI proph    ==================GOC==================================   FULL CODE Assessment and plan: 54 y/o M with no PMH is admitted to ICU for AHRF 2/2 covid19 pna     1. Acute hypoxic respiratory failure  2. ARDS 2/2 Covid pneumonia  3. Pneumothorax  4. Prediabetes  5. Abnormal TSH    =================== Neuro============================  -Alert and oriented x 3 at baseline   -Intubated and sedated 3/29 on Vent  -Trach 4/22 continues on Vent    -Continue Precedex and off Fentanyl drip, adjusting precedex as per requirement  Patient was agitated today, tachypneic. Will give ketamine  -Was started on [id number] mg BID, will slowly titrate down  - fentanyl patch was d/c but later was restarted   -Started on Ativan PRN, Not helping much   -Off versed, propofol  -Started on klonopin  -CT head unremarkable     ================= Cardiovascular==========================  # Hypotension improving  Off pressors    # TACHYCARDIA: recurrent episodes   -HR in 110's  -Continue monitoring     ================- Pulm=================================  #Acute hypoxic respiratory failure: secondary to Covid19 pneumonia  #ARDS  -Was on HFNC then got intubated 3/29  -D-dimer initially elevated on presentation, trending down    -s/p Nimbex and Epifanio at different occasions for vent synchrony  -s/p Pigtail on left chest, removed on 4/15  -Large Pneumothorax was noted on 4/18 on left side, s/p chest tube placement to suction since 4/27, still has pneumothorax-   -Trach 4/22 continues on Vent   -Remdesivir was discontinued due to positive antibodies   - Finished Dexamethasone   - Prednisone taper Finished  - Covid19 PCR negative now, off isolation   - C/w prednisone 40 daily for possible organizing pneumonia   - Started on bactrim empirically, TIW  - Chest tube was kinked, which was fixed by surgery, pneumothorax improved after that  - Plan for anterior chest tube placement today  -       # Bacterial Pneumonia  - Stable infiltrate on CXR ,likely developed Bacterial pneumonia , Finished ABx Zosyn, meropenem, s/p 1 dose of Vancomycin  - MRSA negative from 3/26  - Sputum Cx growing few gram negative rods, CRE  - Secretions from the trach, needs frequent suctions   - Pulm. Dr. Ryan  - ID Dr Anand       #Pneumothorax and pneumomediastinum:   -X-ray chest: remains with left pneumothorax  -Thoracic surgery following  -s/p Chest tube placed 4/8 pigtail to wall suction discontinued on 4/15  -On 4/18 chest tube was placed back on left lung to treat pneumothorax-  chest tube to suction    -CXR 4/26 still shows left lung pneumothorax - CXR 4/29 persistent pneumothorax     -CXR shows persistent PTX, CT chest shows mild persistent PTX  -Plan for  anterior chest tube.      ==================ID===================================  Likely bacterial pneumonia   -leukocytosis resolved  -No more fever, On Tylenol PRN only   -Finished Meropenem  -plan as above     Spiked temp of 101.5 on 5/5  Will send in cultures if spikes again  Will send UA    ================= Nephro================================  -Pitting edema to both arms, ecchymosis on right AC, blisters on left arm around brachial area    -Was retaining urine, andrea was plaed 5/5  -monitor I/Os , good urine output overall  -Will start on albumin and one dose of lasix      =================GI====================================  Transaminitis:   likely secondary to Covid19   - and  on presentation   hepatitis panel -ve  -Improved, now normalized   -continue to monitor LFT    Diet:   S/p NGT (3/29) with tube feed  C/w bowel regimen   G tube 4/23, on tube feeds    Had diarrhea, resolved  c/w TF      ================ Heme==================================  Elevated d-dimer: likely secondary to Covid19   -D-dimer 423 on admission  -Last D-dimer 1434  - No active bleeding, restarted lovenox daily    Anemia  On 4/26 Hb 8.8 dropped to 6.8, s/p 1 unit of PRBC repeat hb 7.2  On 4/27 Hb 7, s/p 2 units of PRBC hb remains 7  On 4/28 Hb 6.8, s/p 1 unit of PRBC hb 7.5 now  4 PRBC total    CTH and CT abdomen w/o contrast no evidence of bleed    No active signs of bleeding (No hematemesis, melena or hematuria)  F/u hemolysis w/u- negative so far, elevated reticulocytes   Dr Andrade on board   CBC on 5/3 was 7.7  Will Monitor  F/u CBC daily     =================Endocrine===============================  Prediabetes:  -A1c 5.8  -BS controlled  -continue HSS    Abnormal TSH:  -TSH level noted 0.26,   -Repeat TSH 0.37 and Free T4 1.82    ================= Skin/Catheters============================  No rashes. Peripheral IV lines.   Both arms with pitting edema, right AC with ecchymosis and left AC with blisters     - =================Prophylaxis =============================  On Lovenox for DVT   Protonix for GI proph    ==================GOC==================================   FULL CODE

## 2021-05-06 NOTE — PROCEDURE NOTE - PROCEDURE DATE TIME, MLM
06-May-2021 18:24
13-Apr-2021 18:37
30-Mar-2021 23:30
06-Apr-2021 13:05
08-Apr-2021 17:38
06-May-2021 14:04
30-Mar-2021 22:00
29-Mar-2021 06:30
18-Apr-2021 10:42

## 2021-05-06 NOTE — PROCEDURE NOTE - NSINDICATIONS_GEN_A_CORE
arterial puncture to obtain ABG's/critical patient/monitoring purposes
critical illness
pneumothorax
pneumothorax
critical illness/emergency venous access
emergency venous access/hemodynamic monitoring/volume resuscitation
critical illness
critical illness
pneumothorax

## 2021-05-06 NOTE — PROGRESS NOTE ADULT - SUBJECTIVE AND OBJECTIVE BOX
INTERVAL HPI/OVERNIGHT EVENTS:  Responsive to touch.     MEDICATIONS  (STANDING):  ascorbic acid 1000 milliGRAM(s) Oral daily  BACItracin   Ointment 1 Application(s) Topical two times a day  chlorhexidine 0.12% Liquid 15 milliLiter(s) Oral Mucosa every 12 hours  chlorhexidine 2% Cloths 1 Application(s) Topical <User Schedule>  clonazePAM  Tablet 1 milliGRAM(s) Oral every 8 hours  dexMEDEtomidine Infusion 0.2 MICROgram(s)/kG/Hr (4.2 mL/Hr) IV Continuous <Continuous>  enoxaparin Injectable 40 milliGRAM(s) SubCutaneous every 24 hours  fentaNYL   Patch  50 MICROgram(s)/Hr. 1 Patch Transdermal every 48 hours  HYDROmorphone  Injectable 1 milliGRAM(s) IV Push every 8 hours  insulin lispro (ADMELOG) corrective regimen sliding scale   SubCutaneous every 6 hours  methadone    Tablet 5 milliGRAM(s) Oral two times a day  OLANZapine 5 milliGRAM(s) Oral two times a day  pantoprazole  Injectable 40 milliGRAM(s) IV Push every 12 hours  polyethylene glycol 3350 17 Gram(s) Oral two times a day  potassium chloride   Powder 40 milliEquivalent(s) Oral every 2 hours  predniSONE   Tablet 40 milliGRAM(s) Oral daily  senna 2 Tablet(s) Oral at bedtime  trimethoprim  160 mG/sulfamethoxazole 800 mG 1 Tablet(s) Oral <User Schedule>    MEDICATIONS  (PRN):  acetaminophen    Suspension .. 650 milliGRAM(s) Oral every 12 hours PRN Temp greater or equal to 38C (100.4F)  ALBUTerol    90 MICROgram(s) HFA Inhaler 2 Puff(s) Inhalation every 6 hours PRN Shortness of Breath and/or Wheezing  artificial  tears Solution 1 Drop(s) Both EYES every 4 hours PRN Dry Eyes  LORazepam   Injectable 4 milliGRAM(s) IV Push every 6 hours PRN Agitation  sodium chloride 0.9% lock flush 10 milliLiter(s) IV Push every 1 hour PRN Pre/post blood products, medications, blood draw, and to maintain line patency      Vital Signs Last 24 Hrs  T(C): 37.3 (06 May 2021 07:00), Max: 38.6 (05 May 2021 19:00)  T(F): 99.1 (06 May 2021 07:00), Max: 101.5 (05 May 2021 19:00)  HR: 97 (06 May 2021 08:00) (73 - 127)  BP: 144/64 (06 May 2021 08:00) (85/37 - 151/73)  BP(mean): 81 (06 May 2021 08:00) (48 - 97)  RR: 29 (06 May 2021 08:00) (18 - 42)  SpO2: 98% (06 May 2021 08:00) (91% - 100%)    Physical:  General: Alert and oriented, not in acute distress  Respiratory: saturating 100%, left chest pigtail catheter flushes well; with 110cc of serous output/24 hrs  : andrea catheter with clear yellow output  Extremities: No pedal edema    I&O's Detail  05 May 2021 07:01  -  06 May 2021 07:00  --------------------------------------------------------  IN:    Dexmedetomidine: 544.8 mL    Enteral Tube Flush: 100 mL  Total IN: 644.8 mL    OUT:    Chest Tube (mL): 110 mL    Incontinent per Condom Catheter (mL): 1000 mL    Indwelling Catheter - Urethral (mL): 595 mL    Jevity 1.5: 0 mL    Osmolite 1.2: 0 mL  Total OUT: 1705 mL    Total NET: -1060.2 mL      06 May 2021 07:01  -  06 May 2021 08:43  --------------------------------------------------------  IN:    Dexmedetomidine: 21 mL  Total IN: 21 mL    OUT:    Indwelling Catheter - Urethral (mL): 150 mL  Total OUT: 150 mL  Total NET: -129 mL    LABS:                      7.1    10.73 )-----------( 277      ( 06 May 2021 03:45 )             22.7             05-06    140  |  102  |  9   ----------------------------<  113<H>  3.4<L>   |  37<H>  |  <0.20<L>    Ca    7.4<L>      06 May 2021 03:45  Phos  3.1     05-06  Mg     1.7     05-06    TPro  4.9<L>  /  Alb  1.6<L>  /  TBili  0.7  /  DBili  x   /  AST  14  /  ALT  17  /  AlkPhos  72  05-06

## 2021-05-07 LAB
ALBUMIN SERPL ELPH-MCNC: 1.9 G/DL — LOW (ref 3.5–5)
ALP SERPL-CCNC: 69 U/L — SIGNIFICANT CHANGE UP (ref 40–120)
ALT FLD-CCNC: 17 U/L DA — SIGNIFICANT CHANGE UP (ref 10–60)
ANION GAP SERPL CALC-SCNC: -1 MMOL/L — LOW (ref 5–17)
APPEARANCE UR: CLEAR — SIGNIFICANT CHANGE UP
AST SERPL-CCNC: 13 U/L — SIGNIFICANT CHANGE UP (ref 10–40)
BACTERIA # UR AUTO: ABNORMAL /HPF
BASE EXCESS BLDA CALC-SCNC: 14 MMOL/L — HIGH (ref -2–3)
BASE EXCESS BLDA CALC-SCNC: 14.5 MMOL/L — HIGH (ref -2–3)
BASOPHILS # BLD AUTO: 0.01 K/UL — SIGNIFICANT CHANGE UP (ref 0–0.2)
BASOPHILS NFR BLD AUTO: 0.2 % — SIGNIFICANT CHANGE UP (ref 0–2)
BILIRUB SERPL-MCNC: 0.6 MG/DL — SIGNIFICANT CHANGE UP (ref 0.2–1.2)
BILIRUB UR-MCNC: ABNORMAL
BLOOD GAS COMMENTS ARTERIAL: SIGNIFICANT CHANGE UP
BLOOD GAS COMMENTS ARTERIAL: SIGNIFICANT CHANGE UP
BUN SERPL-MCNC: 12 MG/DL — SIGNIFICANT CHANGE UP (ref 7–18)
CALCIUM SERPL-MCNC: 8 MG/DL — LOW (ref 8.4–10.5)
CHLORIDE SERPL-SCNC: 100 MMOL/L — SIGNIFICANT CHANGE UP (ref 96–108)
CO2 SERPL-SCNC: 42 MMOL/L — HIGH (ref 22–31)
COD CRY URNS QL: ABNORMAL /HPF
COLOR SPEC: YELLOW — SIGNIFICANT CHANGE UP
COMMENT - URINE: SIGNIFICANT CHANGE UP
CREAT SERPL-MCNC: <0.2 MG/DL — LOW (ref 0.5–1.3)
DIFF PNL FLD: ABNORMAL
EOSINOPHIL # BLD AUTO: 0.08 K/UL — SIGNIFICANT CHANGE UP (ref 0–0.5)
EOSINOPHIL NFR BLD AUTO: 1.2 % — SIGNIFICANT CHANGE UP (ref 0–6)
GLUCOSE BLDC GLUCOMTR-MCNC: 153 MG/DL — HIGH (ref 70–99)
GLUCOSE BLDC GLUCOMTR-MCNC: 161 MG/DL — HIGH (ref 70–99)
GLUCOSE BLDC GLUCOMTR-MCNC: 173 MG/DL — HIGH (ref 70–99)
GLUCOSE SERPL-MCNC: 108 MG/DL — HIGH (ref 70–99)
GLUCOSE UR QL: NEGATIVE — SIGNIFICANT CHANGE UP
HAPTOGLOB SERPL-MCNC: 184 MG/DL — SIGNIFICANT CHANGE UP (ref 34–200)
HCO3 BLDA-SCNC: 42 MMOL/L — HIGH (ref 21–28)
HCO3 BLDA-SCNC: 43 MMOL/L — HIGH (ref 21–28)
HCT VFR BLD CALC: 19.1 % — CRITICAL LOW (ref 39–50)
HCT VFR BLD CALC: 24 % — LOW (ref 39–50)
HGB BLD-MCNC: 7 G/DL — CRITICAL LOW (ref 13–17)
HGB BLD-MCNC: 8.2 G/DL — LOW (ref 13–17)
HOROWITZ INDEX BLDA+IHG-RTO: 40 — SIGNIFICANT CHANGE UP
HOROWITZ INDEX BLDA+IHG-RTO: 50 — SIGNIFICANT CHANGE UP
IMM GRANULOCYTES NFR BLD AUTO: 0.6 % — SIGNIFICANT CHANGE UP (ref 0–1.5)
KETONES UR-MCNC: ABNORMAL
LDH SERPL L TO P-CCNC: 181 U/L — SIGNIFICANT CHANGE UP (ref 120–225)
LEUKOCYTE ESTERASE UR-ACNC: ABNORMAL
LYMPHOCYTES # BLD AUTO: 1.25 K/UL — SIGNIFICANT CHANGE UP (ref 1–3.3)
LYMPHOCYTES # BLD AUTO: 18.8 % — SIGNIFICANT CHANGE UP (ref 13–44)
MAGNESIUM SERPL-MCNC: 1.8 MG/DL — SIGNIFICANT CHANGE UP (ref 1.6–2.6)
MCHC RBC-ENTMCNC: 34.2 GM/DL — SIGNIFICANT CHANGE UP (ref 32–36)
MCHC RBC-ENTMCNC: 36.6 GM/DL — HIGH (ref 32–36)
MCHC RBC-ENTMCNC: 37.1 PG — HIGH (ref 27–34)
MCHC RBC-ENTMCNC: 40.5 PG — HIGH (ref 27–34)
MCV RBC AUTO: 108.6 FL — HIGH (ref 80–100)
MCV RBC AUTO: 110.4 FL — HIGH (ref 80–100)
MONOCYTES # BLD AUTO: 0.48 K/UL — SIGNIFICANT CHANGE UP (ref 0–0.9)
MONOCYTES NFR BLD AUTO: 7.2 % — SIGNIFICANT CHANGE UP (ref 2–14)
NEUTROPHILS # BLD AUTO: 4.79 K/UL — SIGNIFICANT CHANGE UP (ref 1.8–7.4)
NEUTROPHILS NFR BLD AUTO: 72 % — SIGNIFICANT CHANGE UP (ref 43–77)
NITRITE UR-MCNC: POSITIVE
NRBC # BLD: 0 /100 WBCS — SIGNIFICANT CHANGE UP (ref 0–0)
NRBC # BLD: 0 /100 WBCS — SIGNIFICANT CHANGE UP (ref 0–0)
PCO2 BLDA: 65 MMHG — HIGH (ref 35–48)
PCO2 BLDA: 72 MMHG — CRITICAL HIGH (ref 35–48)
PH BLDA: 7.38 — SIGNIFICANT CHANGE UP (ref 7.35–7.45)
PH BLDA: 7.42 — SIGNIFICANT CHANGE UP (ref 7.35–7.45)
PH UR: 6 — SIGNIFICANT CHANGE UP (ref 5–8)
PHOSPHATE SERPL-MCNC: 3 MG/DL — SIGNIFICANT CHANGE UP (ref 2.5–4.5)
PLATELET # BLD AUTO: 254 K/UL — SIGNIFICANT CHANGE UP (ref 150–400)
PLATELET # BLD AUTO: 316 K/UL — SIGNIFICANT CHANGE UP (ref 150–400)
PO2 BLDA: 130 MMHG — HIGH (ref 83–108)
PO2 BLDA: 98 MMHG — SIGNIFICANT CHANGE UP (ref 83–108)
POTASSIUM SERPL-MCNC: 3.3 MMOL/L — LOW (ref 3.5–5.3)
POTASSIUM SERPL-SCNC: 3.3 MMOL/L — LOW (ref 3.5–5.3)
PROT SERPL-MCNC: 5.4 G/DL — LOW (ref 6–8.3)
PROT UR-MCNC: 30 MG/DL
RBC # BLD: 1.73 M/UL — LOW (ref 4.2–5.8)
RBC # BLD: 1.73 M/UL — LOW (ref 4.2–5.8)
RBC # BLD: 2.21 M/UL — LOW (ref 4.2–5.8)
RBC # FLD: 18.9 % — HIGH (ref 10.3–14.5)
RBC # FLD: 19.8 % — HIGH (ref 10.3–14.5)
RBC CASTS # UR COMP ASSIST: ABNORMAL /HPF (ref 0–2)
RETICS #: 50.2 K/UL — SIGNIFICANT CHANGE UP (ref 25–125)
RETICS/RBC NFR: 2.9 % — HIGH (ref 0.5–2.5)
SAO2 % BLDA: 99 % — SIGNIFICANT CHANGE UP
SAO2 % BLDA: 99 % — SIGNIFICANT CHANGE UP
SODIUM SERPL-SCNC: 141 MMOL/L — SIGNIFICANT CHANGE UP (ref 135–145)
SP GR SPEC: 1.02 — SIGNIFICANT CHANGE UP (ref 1.01–1.02)
UROBILINOGEN FLD QL: 12
WBC # BLD: 6.65 K/UL — SIGNIFICANT CHANGE UP (ref 3.8–10.5)
WBC # BLD: 9.42 K/UL — SIGNIFICANT CHANGE UP (ref 3.8–10.5)
WBC # FLD AUTO: 6.65 K/UL — SIGNIFICANT CHANGE UP (ref 3.8–10.5)
WBC # FLD AUTO: 9.42 K/UL — SIGNIFICANT CHANGE UP (ref 3.8–10.5)
WBC UR QL: SIGNIFICANT CHANGE UP /HPF (ref 0–5)

## 2021-05-07 PROCEDURE — 71045 X-RAY EXAM CHEST 1 VIEW: CPT | Mod: 26

## 2021-05-07 RX ORDER — MIDODRINE HYDROCHLORIDE 2.5 MG/1
5 TABLET ORAL EVERY 8 HOURS
Refills: 0 | Status: DISCONTINUED | OUTPATIENT
Start: 2021-05-07 | End: 2021-05-08

## 2021-05-07 RX ORDER — FUROSEMIDE 40 MG
40 TABLET ORAL EVERY 12 HOURS
Refills: 0 | Status: DISCONTINUED | OUTPATIENT
Start: 2021-05-07 | End: 2021-05-11

## 2021-05-07 RX ORDER — SODIUM CHLORIDE 9 MG/ML
500 INJECTION INTRAMUSCULAR; INTRAVENOUS; SUBCUTANEOUS ONCE
Refills: 0 | Status: COMPLETED | OUTPATIENT
Start: 2021-05-07 | End: 2021-05-07

## 2021-05-07 RX ORDER — POTASSIUM CHLORIDE 20 MEQ
40 PACKET (EA) ORAL EVERY 4 HOURS
Refills: 0 | Status: COMPLETED | OUTPATIENT
Start: 2021-05-07 | End: 2021-05-07

## 2021-05-07 RX ADMIN — METHADONE HYDROCHLORIDE 10 MILLIGRAM(S): 40 TABLET ORAL at 06:11

## 2021-05-07 RX ADMIN — OLANZAPINE 5 MILLIGRAM(S): 15 TABLET, FILM COATED ORAL at 17:41

## 2021-05-07 RX ADMIN — Medication 1 MILLIGRAM(S): at 21:58

## 2021-05-07 RX ADMIN — PANTOPRAZOLE SODIUM 40 MILLIGRAM(S): 20 TABLET, DELAYED RELEASE ORAL at 06:11

## 2021-05-07 RX ADMIN — POLYETHYLENE GLYCOL 3350 17 GRAM(S): 17 POWDER, FOR SOLUTION ORAL at 17:06

## 2021-05-07 RX ADMIN — Medication 1: at 11:53

## 2021-05-07 RX ADMIN — Medication 10 MILLIGRAM(S): at 10:51

## 2021-05-07 RX ADMIN — Medication 40 MILLIEQUIVALENT(S): at 06:16

## 2021-05-07 RX ADMIN — CHLORHEXIDINE GLUCONATE 15 MILLILITER(S): 213 SOLUTION TOPICAL at 06:05

## 2021-05-07 RX ADMIN — TAMSULOSIN HYDROCHLORIDE 0.4 MILLIGRAM(S): 0.4 CAPSULE ORAL at 21:58

## 2021-05-07 RX ADMIN — METHADONE HYDROCHLORIDE 10 MILLIGRAM(S): 40 TABLET ORAL at 13:24

## 2021-05-07 RX ADMIN — CHLORHEXIDINE GLUCONATE 15 MILLILITER(S): 213 SOLUTION TOPICAL at 17:06

## 2021-05-07 RX ADMIN — Medication 40 MILLIGRAM(S): at 06:11

## 2021-05-07 RX ADMIN — DEXMEDETOMIDINE HYDROCHLORIDE IN 0.9% SODIUM CHLORIDE 4.2 MICROGRAM(S)/KG/HR: 4 INJECTION INTRAVENOUS at 15:58

## 2021-05-07 RX ADMIN — Medication 40 MILLIEQUIVALENT(S): at 09:37

## 2021-05-07 RX ADMIN — PANTOPRAZOLE SODIUM 40 MILLIGRAM(S): 20 TABLET, DELAYED RELEASE ORAL at 17:03

## 2021-05-07 RX ADMIN — ENOXAPARIN SODIUM 40 MILLIGRAM(S): 100 INJECTION SUBCUTANEOUS at 10:51

## 2021-05-07 RX ADMIN — CHLORHEXIDINE GLUCONATE 1 APPLICATION(S): 213 SOLUTION TOPICAL at 06:06

## 2021-05-07 RX ADMIN — DEXMEDETOMIDINE HYDROCHLORIDE IN 0.9% SODIUM CHLORIDE 4.2 MICROGRAM(S)/KG/HR: 4 INJECTION INTRAVENOUS at 21:59

## 2021-05-07 RX ADMIN — DEXMEDETOMIDINE HYDROCHLORIDE IN 0.9% SODIUM CHLORIDE 4.2 MICROGRAM(S)/KG/HR: 4 INJECTION INTRAVENOUS at 08:26

## 2021-05-07 RX ADMIN — Medication 1000 MILLIGRAM(S): at 11:38

## 2021-05-07 RX ADMIN — Medication 1 MILLIGRAM(S): at 13:22

## 2021-05-07 RX ADMIN — POLYETHYLENE GLYCOL 3350 17 GRAM(S): 17 POWDER, FOR SOLUTION ORAL at 06:15

## 2021-05-07 RX ADMIN — HYDROMORPHONE HYDROCHLORIDE 1 MILLIGRAM(S): 2 INJECTION INTRAMUSCULAR; INTRAVENOUS; SUBCUTANEOUS at 06:11

## 2021-05-07 RX ADMIN — Medication 4 MILLIGRAM(S): at 06:11

## 2021-05-07 RX ADMIN — Medication 40 MILLIGRAM(S): at 21:58

## 2021-05-07 RX ADMIN — SODIUM CHLORIDE 10 MILLILITER(S): 9 INJECTION INTRAMUSCULAR; INTRAVENOUS; SUBCUTANEOUS at 16:39

## 2021-05-07 RX ADMIN — Medication 2 MILLIGRAM(S): at 17:56

## 2021-05-07 RX ADMIN — Medication 1: at 23:30

## 2021-05-07 RX ADMIN — Medication 1: at 17:04

## 2021-05-07 RX ADMIN — Medication 50 MILLILITER(S): at 06:12

## 2021-05-07 RX ADMIN — Medication 1 TABLET(S): at 11:39

## 2021-05-07 RX ADMIN — HYDROMORPHONE HYDROCHLORIDE 1 MILLIGRAM(S): 2 INJECTION INTRAMUSCULAR; INTRAVENOUS; SUBCUTANEOUS at 06:13

## 2021-05-07 RX ADMIN — Medication 1 APPLICATION(S): at 17:04

## 2021-05-07 RX ADMIN — Medication 40 MILLIGRAM(S): at 13:21

## 2021-05-07 RX ADMIN — Medication 1 MILLIGRAM(S): at 06:11

## 2021-05-07 RX ADMIN — SODIUM CHLORIDE 1000 MILLILITER(S): 9 INJECTION INTRAMUSCULAR; INTRAVENOUS; SUBCUTANEOUS at 23:30

## 2021-05-07 RX ADMIN — Medication 4 MILLIGRAM(S): at 00:08

## 2021-05-07 RX ADMIN — METHADONE HYDROCHLORIDE 10 MILLIGRAM(S): 40 TABLET ORAL at 21:58

## 2021-05-07 RX ADMIN — OLANZAPINE 5 MILLIGRAM(S): 15 TABLET, FILM COATED ORAL at 06:12

## 2021-05-07 RX ADMIN — HYDROMORPHONE HYDROCHLORIDE 1 MILLIGRAM(S): 2 INJECTION INTRAMUSCULAR; INTRAVENOUS; SUBCUTANEOUS at 00:06

## 2021-05-07 RX ADMIN — Medication 1 APPLICATION(S): at 06:05

## 2021-05-07 RX ADMIN — SODIUM CHLORIDE 10 MILLILITER(S): 9 INJECTION INTRAMUSCULAR; INTRAVENOUS; SUBCUTANEOUS at 10:52

## 2021-05-07 RX ADMIN — SODIUM CHLORIDE 1000 MILLILITER(S): 9 INJECTION INTRAMUSCULAR; INTRAVENOUS; SUBCUTANEOUS at 04:07

## 2021-05-07 RX ADMIN — FENTANYL CITRATE 1 PATCH: 50 INJECTION INTRAVENOUS at 07:35

## 2021-05-07 RX ADMIN — MIDODRINE HYDROCHLORIDE 5 MILLIGRAM(S): 2.5 TABLET ORAL at 13:24

## 2021-05-07 RX ADMIN — MIDODRINE HYDROCHLORIDE 5 MILLIGRAM(S): 2.5 TABLET ORAL at 21:58

## 2021-05-07 RX ADMIN — DEXMEDETOMIDINE HYDROCHLORIDE IN 0.9% SODIUM CHLORIDE 4.2 MICROGRAM(S)/KG/HR: 4 INJECTION INTRAVENOUS at 18:49

## 2021-05-07 RX ADMIN — FENTANYL CITRATE 1 PATCH: 50 INJECTION INTRAVENOUS at 18:19

## 2021-05-07 NOTE — PROGRESS NOTE ADULT - SUBJECTIVE AND OBJECTIVE BOX
Patient is a 55y old  Male who presents with a chief complaint of SOB (06 May 2021 10:57)    pt seen in icu [ x ], reg med floor [   ], bed [ x ], chair at bedside [   ], awake and responsive [ x ], mildly sedated, [  ],    nad [x  ]      Allergies    No Known Allergies        Vitals    T(F): 98.4 (05-07-21 @ 04:00), Max: 100.2 (05-06-21 @ 12:00)  HR: 82 (05-07-21 @ 05:00) (78 - 121)  BP: 95/47 (05-07-21 @ 05:00) (86/43 - 153/64)  RR: 20 (05-07-21 @ 05:00) (16 - 42)  SpO2: 99% (05-07-21 @ 05:00) (88% - 100%)  Wt(kg): --  CAPILLARY BLOOD GLUCOSE      POCT Blood Glucose.: 146 mg/dL (06 May 2021 23:04)      Labs                          7.0    6.65  )-----------( 254      ( 07 May 2021 05:09 )             19.1       05-07    141  |  100  |  12  ----------------------------<  108<H>  3.3<L>   |  42<H>  |  <0.20<L>    Ca    8.0<L>      07 May 2021 05:09  Phos  3.0     05-07  Mg     1.8     05-07    TPro  5.4<L>  /  Alb  1.9<L>  /  TBili  0.6  /  DBili  x   /  AST  13  /  ALT  17  /  AlkPhos  69  05-07            .Sputum Sputum  04-16 @ 04:42   Moderate Enterobacter aerogenes (Carbapenem Resistant)  Normal Respiratory Monserrat present  --  Enterobacter aerogenes (Carbapenem Resistant)      .Urine Clean Catch (Midstream)  03-15 @ 00:52   No growth  --  --          Radiology Results      Meds    MEDICATIONS  (STANDING):  ascorbic acid 1000 milliGRAM(s) Oral daily  BACItracin   Ointment 1 Application(s) Topical two times a day  chlorhexidine 0.12% Liquid 15 milliLiter(s) Oral Mucosa every 12 hours  chlorhexidine 2% Cloths 1 Application(s) Topical <User Schedule>  clonazePAM  Tablet 1 milliGRAM(s) Oral every 8 hours  dexMEDEtomidine Infusion 0.2 MICROgram(s)/kG/Hr (4.2 mL/Hr) IV Continuous <Continuous>  enoxaparin Injectable 40 milliGRAM(s) SubCutaneous every 24 hours  HYDROmorphone  Injectable 1 milliGRAM(s) IV Push every 8 hours  insulin lispro (ADMELOG) corrective regimen sliding scale   SubCutaneous every 6 hours  methadone    Tablet 10 milliGRAM(s) Oral every 8 hours  OLANZapine 5 milliGRAM(s) Oral two times a day  pantoprazole  Injectable 40 milliGRAM(s) IV Push every 12 hours  polyethylene glycol 3350 17 Gram(s) Oral two times a day  potassium chloride   Powder 40 milliEquivalent(s) Oral every 4 hours  predniSONE   Tablet 40 milliGRAM(s) Oral daily  tamsulosin 0.4 milliGRAM(s) Oral at bedtime  trimethoprim  160 mG/sulfamethoxazole 800 mG 1 Tablet(s) Oral <User Schedule>      MEDICATIONS  (PRN):  acetaminophen    Suspension .. 650 milliGRAM(s) Oral every 12 hours PRN Temp greater or equal to 38C (100.4F)  ALBUTerol    90 MICROgram(s) HFA Inhaler 2 Puff(s) Inhalation every 6 hours PRN Shortness of Breath and/or Wheezing  artificial  tears Solution 1 Drop(s) Both EYES every 4 hours PRN Dry Eyes  LORazepam   Injectable 4 milliGRAM(s) IV Push every 6 hours PRN Agitation  sodium chloride 0.9% lock flush 10 milliLiter(s) IV Push every 1 hour PRN Pre/post blood products, medications, blood draw, and to maintain line patency  sodium chloride 0.9% lock flush 10 milliLiter(s) IV Push every 1 hour PRN Pre/post blood products, medications, blood draw, and to maintain line patency      Physical Exam    Neuro :  no focal deficits  Respiratory: CTA B/L  CV: RRR, S1S2, no murmurs,   Abdominal: Soft, NT, ND +BS, g- tube in place, dressing cdi  Extremities: b/l ue edema, + peripheral pulses      ASSESSMENT    Hypoxemia 2nd to covid pna   transaminitis  prediabetes  h/o appendectomy  cholecystectomy        PLAN    contact and airborne isolation  d/c remdesevir given covid ab positive noted   completed dexamethasone   started pulse steroids for 3 days - 250mg solumedrol bid now tapered off 4/14/21   cont prednisone 40mg daily  cont asa, vit c,    cont albuterol inhaler   pulm f/u  procalcitonin, D-dimer, crp, ldh, ferritin, lactate noted ,    tmx 101.5   cont tylenol prn,   cont robitussin prn   pt on precedex drip    pt on fentanyl patch  off pressors   s/p intubation 3/29/21   s/p tracheostomy 4/21/21  O2 sat (91% - 100%) mech vent 40%  O2 via mech vent and taper fio2 as tolerated   vent mgmt as per icu  xray 3/19/21 with pneumomediastinum  rept cxr with New trace right apical pneumothorax. New mild left apical pneumothorax. Grossly stable small pneumomediastinum.  Soft tissue emphysema at the neck bases bilaterally. Grossly stable bilateral pulmonary infiltrates noted.   cxr 2/24 with No evidence of pneumothorax can be appreciated on the available image. This may be related to patient positioning. Evidence of pneumomediastinum and subcutaneous emphysema in the lower neck is again noted. There are patchy bibasilar infiltrates and elevated right hemidiaphragm noted.   cxr 3/29/21 with No significant change bilateral infiltrates. There is a small simple left apical pneumothorax. No significant pleural effusion. Bilateral subcutaneous emphysema similar to prior.   XRs on 7AM and 5PM with no obvious increase of ptx  cxr 4/4/21 with Improving bilateral airspace disease noted    cxr 4/8/21 with Small left pneumothorax noted.  thoracic surg f/u   s/p L chest tube replacement 4/18/21 for recurrence of left pneumothorax   cxr 4/20/21 with Unchanged advanced infiltrates and catheter left chest tube noted   CXR 4/11/21 with : No interval change compared to one day prior. No pneumothorax noted.   unable to flush or aspirate tube fully, noted debris in tubing which was milked out.   Once material removed from tubing able to aspirated and flush fully. Also changed dressing on pigtail which appears to be in good position.   Monitor O2 status   s/p tracheostomy 4/21/21   stay sutures out 2 weeks from OR date  cxr 4/21/21 with No evidence of active chest disease. Tracheostomy tube in place otherwise no significant change noted   cxr 4/25/21 with A 40% LEFT pneumothorax. LEFT multi-sidehole pigtail catheter overlies LEFT lower hemithorax.. Bilateral multifocal and diffuse ill-defined airspace opacities..  Follow-up AP portable chest radiograph 4/25/2021 AT 8:58 AM: Residual LEFT 30% upper zone pneumothorax. Otherwise no interval change.noted    cxr 4/30/21 Tracheostomy tube again noted. Left chest tube noted with small left apical pneumothorax unchanged. Bilateral airspace opacities overall worsening. Heart size cannot be accurately assessed in this projection noted.   cxr 5/3/21 with Large left pneumothorax noted above.   cxr 5 4/21 with No significant interval change noted above.   ct chest with Extensive chronic appearing consolidative opacities throughout the lungs, most prominent in the left lower lobe and lingula, with bronchiectasis, scarring, and volume loss. Additional scattered peripheral coarse opacities.   Mild left pneumothorax. Left lateral pleural space pigtail catheter, not in continuity with the pneumothorax. Mild bilateral hydronephrosis, similar to appearance on CT of the abdomen and pelvis on April 27. noted above  s/p surgical placement of gastrostomy tube 4/23/2021.   cont on tube feeding  id f/u   No need for further antibiotics as patient completed course of Meropenem  leukocytosis resolved  speutum cx with Enterobacter aerogenes (Carbapenem Resistant) noted above  andrea   lispro ss   prognosis poor   s/p 4 units prbc for anemia  f/u h/h    check vitamin b12  heme onc f/u  retic is elevated.    Haptoglobin normal  had dropped but now stable again  ? daily phlebotomy  no hemolysis, Fe/B12/folate adequate  hemolysis is unlikely even his baseline haptoglobin may be very elevated due to COVID  direct pamella neg noted above   COVID is known to cause cold agglutinin  cont current meds  mgmt as per icu          Patient is a 55y old  Male who presents with a chief complaint of SOB (06 May 2021 10:57)    pt seen in icu [ x ], reg med floor [   ], bed [ x ], chair at bedside [   ], awake and responsive [ x ], mildly sedated, [  ],    nad [x  ]      Allergies    No Known Allergies        Vitals    T(F): 98.4 (05-07-21 @ 04:00), Max: 100.2 (05-06-21 @ 12:00)  HR: 82 (05-07-21 @ 05:00) (78 - 121)  BP: 95/47 (05-07-21 @ 05:00) (86/43 - 153/64)  RR: 20 (05-07-21 @ 05:00) (16 - 42)  SpO2: 99% (05-07-21 @ 05:00) (88% - 100%)  Wt(kg): --  CAPILLARY BLOOD GLUCOSE      POCT Blood Glucose.: 146 mg/dL (06 May 2021 23:04)      Labs                          7.0    6.65  )-----------( 254      ( 07 May 2021 05:09 )             19.1       05-07    141  |  100  |  12  ----------------------------<  108<H>  3.3<L>   |  42<H>  |  <0.20<L>    Ca    8.0<L>      07 May 2021 05:09  Phos  3.0     05-07  Mg     1.8     05-07    TPro  5.4<L>  /  Alb  1.9<L>  /  TBili  0.6  /  DBili  x   /  AST  13  /  ALT  17  /  AlkPhos  69  05-07            .Sputum Sputum  04-16 @ 04:42   Moderate Enterobacter aerogenes (Carbapenem Resistant)  Normal Respiratory Monserrat present  --  Enterobacter aerogenes (Carbapenem Resistant)      .Urine Clean Catch (Midstream)  03-15 @ 00:52   No growth  --  --          Radiology Results    < from: Xray Chest 1 View-PORTABLE IMMEDIATE (Xray Chest 1 View-PORTABLE IMMEDIATE .) (05.06.21 @ 20:10) >  Heart magnified by technique. Tracheostomy and catheter left chest tubes remain.    There are significant diffuse advanced infiltrates again noted.    No pneumothorax.    Above findings are similar to study earlier in the day.    Present film shows a left jugular line inserted with tip entering the superior vena cava.    IMPRESSION: Left jugular line inserted.    Persistent advanced infiltrates.    < end of copied text >      Meds    MEDICATIONS  (STANDING):  ascorbic acid 1000 milliGRAM(s) Oral daily  BACItracin   Ointment 1 Application(s) Topical two times a day  chlorhexidine 0.12% Liquid 15 milliLiter(s) Oral Mucosa every 12 hours  chlorhexidine 2% Cloths 1 Application(s) Topical <User Schedule>  clonazePAM  Tablet 1 milliGRAM(s) Oral every 8 hours  dexMEDEtomidine Infusion 0.2 MICROgram(s)/kG/Hr (4.2 mL/Hr) IV Continuous <Continuous>  enoxaparin Injectable 40 milliGRAM(s) SubCutaneous every 24 hours  HYDROmorphone  Injectable 1 milliGRAM(s) IV Push every 8 hours  insulin lispro (ADMELOG) corrective regimen sliding scale   SubCutaneous every 6 hours  methadone    Tablet 10 milliGRAM(s) Oral every 8 hours  OLANZapine 5 milliGRAM(s) Oral two times a day  pantoprazole  Injectable 40 milliGRAM(s) IV Push every 12 hours  polyethylene glycol 3350 17 Gram(s) Oral two times a day  potassium chloride   Powder 40 milliEquivalent(s) Oral every 4 hours  predniSONE   Tablet 40 milliGRAM(s) Oral daily  tamsulosin 0.4 milliGRAM(s) Oral at bedtime  trimethoprim  160 mG/sulfamethoxazole 800 mG 1 Tablet(s) Oral <User Schedule>      MEDICATIONS  (PRN):  acetaminophen    Suspension .. 650 milliGRAM(s) Oral every 12 hours PRN Temp greater or equal to 38C (100.4F)  ALBUTerol    90 MICROgram(s) HFA Inhaler 2 Puff(s) Inhalation every 6 hours PRN Shortness of Breath and/or Wheezing  artificial  tears Solution 1 Drop(s) Both EYES every 4 hours PRN Dry Eyes  LORazepam   Injectable 4 milliGRAM(s) IV Push every 6 hours PRN Agitation  sodium chloride 0.9% lock flush 10 milliLiter(s) IV Push every 1 hour PRN Pre/post blood products, medications, blood draw, and to maintain line patency  sodium chloride 0.9% lock flush 10 milliLiter(s) IV Push every 1 hour PRN Pre/post blood products, medications, blood draw, and to maintain line patency      Physical Exam    Neuro :  no focal deficits  Respiratory: CTA B/L  CV: RRR, S1S2, no murmurs,   Abdominal: Soft, NT, ND +BS, g- tube in place, dressing cdi  Extremities: b/l ue edema, + peripheral pulses      ASSESSMENT    Hypoxemia 2nd to covid pna   transaminitis  prediabetes  h/o appendectomy  cholecystectomy        PLAN    contact and airborne isolation  d/c remdesevir given covid ab positive noted   completed dexamethasone   started pulse steroids for 3 days - 250mg solumedrol bid now tapered off 4/14/21   cont prednisone 40mg daily  cont asa, vit c,    cont albuterol inhaler   pulm f/u  procalcitonin, D-dimer, crp, ldh, ferritin, lactate noted ,    tmx 100.2    cont tylenol prn,   cont robitussin prn   pt on precedex drip    pt on fentanyl patch  off pressors   s/p intubation 3/29/21   s/p tracheostomy 4/21/21  O2 sat (88% - 100%) mech vent 40%  O2 via mech vent and taper fio2 as tolerated   vent mgmt as per icu  xray 3/19/21 with pneumomediastinum  rept cxr with New trace right apical pneumothorax. New mild left apical pneumothorax. Grossly stable small pneumomediastinum.  Soft tissue emphysema at the neck bases bilaterally. Grossly stable bilateral pulmonary infiltrates noted.   cxr 2/24 with No evidence of pneumothorax can be appreciated on the available image. This may be related to patient positioning. Evidence of pneumomediastinum and subcutaneous emphysema in the lower neck is again noted. There are patchy bibasilar infiltrates and elevated right hemidiaphragm noted.   cxr 3/29/21 with No significant change bilateral infiltrates. There is a small simple left apical pneumothorax. No significant pleural effusion. Bilateral subcutaneous emphysema similar to prior.   XRs on 7AM and 5PM with no obvious increase of ptx  cxr 4/4/21 with Improving bilateral airspace disease noted    cxr 4/8/21 with Small left pneumothorax noted.  thoracic surg f/u   s/p L chest tube replacement 4/18/21 for recurrence of left pneumothorax   cxr 4/20/21 with Unchanged advanced infiltrates and catheter left chest tube noted   CXR 4/11/21 with : No interval change compared to one day prior. No pneumothorax noted.   unable to flush or aspirate tube fully, noted debris in tubing which was milked out.   Once material removed from tubing able to aspirated and flush fully. Also changed dressing on pigtail which appears to be in good position.   Monitor O2 status   s/p tracheostomy 4/21/21   stay sutures out 2 weeks from OR date  cxr 4/21/21 with No evidence of active chest disease. Tracheostomy tube in place otherwise no significant change noted   cxr 4/25/21 with A 40% LEFT pneumothorax. LEFT multi-sidehole pigtail catheter overlies LEFT lower hemithorax.. Bilateral multifocal and diffuse ill-defined airspace opacities..  Follow-up AP portable chest radiograph 4/25/2021 AT 8:58 AM: Residual LEFT 30% upper zone pneumothorax. Otherwise no interval change.noted    cxr 4/30/21 Tracheostomy tube again noted. Left chest tube noted with small left apical pneumothorax unchanged. Bilateral airspace opacities overall worsening. Heart size cannot be accurately assessed in this projection noted.   cxr 5/3/21 with Large left pneumothorax noted above.   cxr 5 4/21 with No significant interval change noted above.   ct chest with Extensive chronic appearing consolidative opacities throughout the lungs, most prominent in the left lower lobe and lingula, with bronchiectasis, scarring, and volume loss. Additional scattered peripheral coarse opacities.   Mild left pneumothorax. Left lateral pleural space pigtail catheter, not in continuity with the pneumothorax. Mild bilateral hydronephrosis, similar to appearance on CT of the abdomen and pelvis on April 27. noted above   s/p 2nd chest tube 5/5/21  cxr 5/6/21 with Tracheostomy and catheter left chest tubes remain. , There are significant diffuse advanced infiltrates again noted. No pneumothorax. Above findings are similar to study earlier in the day. Present film shows a left jugular line inserted   with tip entering the superior vena cava noted above.  s/p surgical placement of gastrostomy tube 4/23/2021.   cont on tube feeding  id f/u   No need for further antibiotics as patient completed course of Meropenem  leukocytosis resolved  speutum cx with Enterobacter aerogenes (Carbapenem Resistant) noted above  andrea   lispro ss   prognosis poor   s/p 4 units prbc for anemia  f/u h/h    transfuse prbc as needed  check vitamin b12  heme onc f/u  retic is elevated.    Haptoglobin normal  ? daily phlebotomy  no hemolysis, Fe/B12/folate adequate  hemolysis is unlikely even his baseline haptoglobin may be very elevated due to COVID  direct pamella neg noted above   COVID is known to cause cold agglutinin  cont current meds  mgmt as per icu

## 2021-05-07 NOTE — CHART NOTE - NSCHARTNOTEFT_GEN_A_CORE
Assessment:   Patient is a 55y old  Male who presents with a chief complaint of SOB (07 May 2021 10:56)Pt with trach/ PEG. TF changed to Osmolite 1.2 by MD due to diarrhea. Discussed with RN, previously, reports pt taken off bowel regime, no BM. NOw with no BM x2 days, noted, giving ducolax suppository. Discussed with ICU fellow, plan was to change back to Jevity 1.5         Diet Prescription: Diet, NPO with Tube Feed:   Tube Feeding Modality: Gastrostomy  Osmolite 1.2 Leonardo  Total Volume for 24 Hours (mL): 960  Continuous  Starting Tube Feed Rate {mL per Hour}: 10  Increase Tube Feed Rate by (mL): 10     Every hour  Until Goal Tube Feed Rate (mL per Hour): 40  Tube Feed Duration (in Hours): 24  Tube Feed Start Time: 01:00 (21 @ 11:39)    Intake: Osmolite 1.2 @ 40 ml/hr noted on flow record    Daily     Daily Weight in k.9 (07 May 2021 08:00)  Weight in k.3 (06 May 2021 07:00)  Weight in k.5 (05 May 2021 07:00)  Weight in k.5 (04 May 2021 07:00)  Weight in k.7 (03 May 2021 07:00)  Weight in k.8 (02 May 2021 07:00)  Weight in k.9 (01 May 2021 07:00)  Weight in k.1 (2021 07:00)  Weight in k (2021 08:00)  Weight in k.9 (2021 08:00)  Weight in k.6 (2021 08:00)  Weight in k (2021 07:30)  Weight in k (2021 07:30)        Pertinent Medications: MEDICATIONS  (STANDING):  ascorbic acid 1000 milliGRAM(s) Oral daily  BACItracin   Ointment 1 Application(s) Topical two times a day  chlorhexidine 0.12% Liquid 15 milliLiter(s) Oral Mucosa every 12 hours  chlorhexidine 2% Cloths 1 Application(s) Topical <User Schedule>  clonazePAM  Tablet 1 milliGRAM(s) Oral every 8 hours  dexMEDEtomidine Infusion 0.2 MICROgram(s)/kG/Hr (4.2 mL/Hr) IV Continuous <Continuous>  enoxaparin Injectable 40 milliGRAM(s) SubCutaneous every 24 hours  furosemide   Injectable 40 milliGRAM(s) IV Push every 12 hours  insulin lispro (ADMELOG) corrective regimen sliding scale   SubCutaneous every 6 hours  methadone    Tablet 10 milliGRAM(s) Oral every 8 hours  midodrine 5 milliGRAM(s) Oral every 8 hours  OLANZapine 5 milliGRAM(s) Oral two times a day  pantoprazole  Injectable 40 milliGRAM(s) IV Push every 12 hours  polyethylene glycol 3350 17 Gram(s) Oral two times a day  predniSONE   Tablet 40 milliGRAM(s) Oral daily  tamsulosin 0.4 milliGRAM(s) Oral at bedtime  trimethoprim  160 mG/sulfamethoxazole 800 mG 1 Tablet(s) Oral <User Schedule>    MEDICATIONS  (PRN):  acetaminophen    Suspension .. 650 milliGRAM(s) Oral every 12 hours PRN Temp greater or equal to 38C (100.4F)  ALBUTerol    90 MICROgram(s) HFA Inhaler 2 Puff(s) Inhalation every 6 hours PRN Shortness of Breath and/or Wheezing  artificial  tears Solution 1 Drop(s) Both EYES every 4 hours PRN Dry Eyes  LORazepam   Injectable 2 milliGRAM(s) IV Push every 6 hours PRN Agitation  sodium chloride 0.9% lock flush 10 milliLiter(s) IV Push every 1 hour PRN Pre/post blood products, medications, blood draw, and to maintain line patency  sodium chloride 0.9% lock flush 10 milliLiter(s) IV Push every 1 hour PRN Pre/post blood products, medications, blood draw, and to maintain line patency    Pertinent Labs:  Na141 mmol/L Glu 108 mg/dL<H> K+ 3.3 mmol/L<L> Cr  <0.20 mg/dL<L> BUN 12 mg/dL  Phos 3.0 mg/dL  Alb 1.9 g/dL<L>  Chol 165 mg/dL LDL --    HDL 26 mg/dL<L> Trig 414 mg/dL<H>     CAPILLARY BLOOD GLUCOSE      POCT Blood Glucose.: 173 mg/dL (07 May 2021 16:02)  POCT Blood Glucose.: 161 mg/dL (07 May 2021 11:44)  POCT Blood Glucose.: 146 mg/dL (06 May 2021 23:04)        Previous Nutrition Diagnosis:   [ ] Altered GI function  [ ]Inadequate Oral Intake [ ] Swallowing Difficulty   [ ] Altered nutrition related labs [ ] Increased Nutrient Needs [ ] Overweight/Obesity   [ ] Unintended Weight Loss [ ] Food & Nutrition Related Knowledge Deficit [x ] Malnutrition (severe PCM)  [ ] Other:     Nutrition Diagnosis is [x ] ongoing  [ ] resolved [ ] not applicable     New Nutrition Diagnosis: [ ] not applicable       Interventions:   Recommend  [ ] Change Diet To:  [ ] Nutrition Supplement  [x ] Nutrition Support: Consider Jevity 1.5 40x24 + 1 Pkt Prosource BID. MD to monitor. RD available. If continuing with Osmolite 1.2, increase to 50 x24 (1200 ml, 1440 kcals,67 gm protein). Add 1 Pkt Prosource BID  [ ] Other:     Monitoring and Evaluation:    [ x ] Tolerance to diet prescription [ x ] weights [ x ] labs[ x ] follow up per protocol  [ ] other:

## 2021-05-07 NOTE — PROGRESS NOTE ADULT - ASSESSMENT
Assessment and plan: 56 y/o M with no PMH is admitted to ICU for AHRF 2/2 covid19 pna     1. Acute hypoxic respiratory failure  2. ARDS 2/2 Covid pneumonia  3. Pneumothorax  4. Prediabetes  5. Abnormal TSH    =================== Neuro============================  -Alert and oriented x 3 at baseline   -Intubated and sedated 3/29 on Vent  -Trach 4/22 continues on Vent    -Continue Precedex and off Fentanyl drip, adjusting precedex as per requirement  Patient was agitated today, tachypneic. Will give ketamine  -Was started on [id number] mg BID, will slowly titrate down  - fentanyl patch was d/c but later was restarted   -Started on Ativan PRN, Not helping much   -Off versed, propofol  -Started on klonopin  Will d/c dilaudid, decrease ativan to 2mg Iv q6  -CT head unremarkable     ================= Cardiovascular==========================  # Hypotension   Was hypotensive overnight  Was given 500cc bolus  Will d/c dilaudid    # TACHYCARDIA: recurrent episodes   -HR in 110's  -Continue monitoring     ================- Pulm=================================  #Acute hypoxic respiratory failure: secondary to Covid19 pneumonia  #ARDS  -Was on HFNC then got intubated 3/29  -D-dimer initially elevated on presentation, trending down    -s/p Nimbex and Epifanio at different occasions for vent synchrony  -s/p Pigtail on left chest, removed on 4/15  -Large Pneumothorax was noted on 4/18 on left side, s/p chest tube placement to suction since 4/27, still has pneumothorax-   -Trach 4/22 continues on Vent   -Remdesivir was discontinued due to positive antibodies   - Finished Dexamethasone   - Prednisone taper Finished  - Covid19 PCR negative now, off isolation   - C/w prednisone 40 daily for possible organizing pneumonia   - Started on bactrim empirically, TIW  - Chest tube was kinked, which was fixed by surgery, pneumothorax improved after that  - anterior chest tube was placed today  - CXR shows improvement in PTX      # Bacterial Pneumonia  - Stable infiltrate on CXR ,likely developed Bacterial pneumonia , Finished ABx Zosyn, meropenem, s/p 1 dose of Vancomycin  - MRSA negative from 3/26  - Sputum Cx growing few gram negative rods, CRE  - Secretions from the trach, needs frequent suctions   - Pulm. Dr. Ryan  - ID Dr Anand       #Pneumothorax and pneumomediastinum:   -X-ray chest: remains with left pneumothorax  -Thoracic surgery following  -s/p Chest tube placed 4/8 pigtail to wall suction discontinued on 4/15  -On 4/18 chest tube was placed back on left lung to treat pneumothorax-  chest tube to suction    -CXR 4/26 still shows left lung pneumothorax - CXR 4/29 persistent pneumothorax     -CXR shows persistent PTX, CT chest shows mild persistent PTX  -anterior chest tube 5/6  CXR shows improvement      ==================ID===================================  Likely bacterial pneumonia   -leukocytosis resolved  -No more fever, On Tylenol PRN only   -Finished Meropenem  -plan as above     Spiked temp of 101.5 on 5/5  Will send in cultures if spikes again  UA was negative    ================= Nephro================================  -Pitting edema to both arms, ecchymosis on right AC, blisters on left arm around brachial area    -Was retaining urine, andrea was placed 5/5  -monitor I/Os   -Was started on albumin  Has been having low UO  Will start on free water through PEG tube to increase hydration  Will remove peripheral IV lines and put ACE wraps to decrease edema    =================GI====================================  Transaminitis:   likely secondary to Covid19   - and  on presentation   hepatitis panel -ve  -Improved, now normalized   -continue to monitor LFT    Diet:   S/p NGT (3/29) with tube feed  C/w bowel regimen   G tube 4/23, on tube feeds    No BM in last 2 days, will give dulcolax suppository    ================ Heme==================================  Elevated d-dimer: likely secondary to Covid19   -D-dimer 423 on admission  -Last D-dimer 1434  - No active bleeding, restarted lovenox daily    Anemia  On 4/26 Hb 8.8 dropped to 6.8, s/p 1 unit of PRBC repeat hb 7.2  On 4/27 Hb 7, s/p 2 units of PRBC hb remains 7  On 4/28 Hb 6.8, s/p 1 unit of PRBC hb 7.5 now  4 PRBC total    CTH and CT abdomen w/o contrast no evidence of bleed    No active signs of bleeding (No hematemesis, melena or hematuria)  F/u hemolysis w/u- negative so far, elevated reticulocytes   Dr Andrade on board   CBC on 5/3 was 7.7  Will Monitor  F/u CBC daily     =================Endocrine===============================  Prediabetes:  -A1c 5.8  -BS controlled  -continue HSS    Abnormal TSH:  -TSH level noted 0.26,   -Repeat TSH 0.37 and Free T4 1.82    ================= Skin/Catheters============================  No rashes. Peripheral IV lines.   Both arms with pitting edema, right AC with ecchymosis and left AC with blisters     - =================Prophylaxis =============================  On Lovenox for DVT   Protonix for GI proph    ==================GOC==================================   FULL CODE

## 2021-05-07 NOTE — PROGRESS NOTE ADULT - PROBLEM SELECTOR PLAN 3
Ct chest  noted  Thoracic sx follow up  S/P  anterior pig tail tube by IR on left  Daily  CXR   Management per ICU

## 2021-05-07 NOTE — PROGRESS NOTE ADULT - SUBJECTIVE AND OBJECTIVE BOX
still remain intubated  low grade temp    ROS:  Negative except for:    MEDICATIONS  (STANDING):  ascorbic acid 1000 milliGRAM(s) Oral daily  BACItracin   Ointment 1 Application(s) Topical two times a day  chlorhexidine 0.12% Liquid 15 milliLiter(s) Oral Mucosa every 12 hours  chlorhexidine 2% Cloths 1 Application(s) Topical <User Schedule>  clonazePAM  Tablet 1 milliGRAM(s) Oral every 8 hours  dexMEDEtomidine Infusion 0.2 MICROgram(s)/kG/Hr (4.2 mL/Hr) IV Continuous <Continuous>  enoxaparin Injectable 40 milliGRAM(s) SubCutaneous every 24 hours  furosemide   Injectable 40 milliGRAM(s) IV Push every 12 hours  insulin lispro (ADMELOG) corrective regimen sliding scale   SubCutaneous every 6 hours  LORazepam   Injectable 2 milliGRAM(s) IV Push once  methadone    Tablet 10 milliGRAM(s) Oral every 8 hours  midodrine 5 milliGRAM(s) Oral every 8 hours  OLANZapine 5 milliGRAM(s) Oral two times a day  pantoprazole  Injectable 40 milliGRAM(s) IV Push every 12 hours  polyethylene glycol 3350 17 Gram(s) Oral two times a day  predniSONE   Tablet 40 milliGRAM(s) Oral daily  tamsulosin 0.4 milliGRAM(s) Oral at bedtime  trimethoprim  160 mG/sulfamethoxazole 800 mG 1 Tablet(s) Oral <User Schedule>    MEDICATIONS  (PRN):  acetaminophen    Suspension .. 650 milliGRAM(s) Oral every 12 hours PRN Temp greater or equal to 38C (100.4F)  ALBUTerol    90 MICROgram(s) HFA Inhaler 2 Puff(s) Inhalation every 6 hours PRN Shortness of Breath and/or Wheezing  artificial  tears Solution 1 Drop(s) Both EYES every 4 hours PRN Dry Eyes  LORazepam   Injectable 2 milliGRAM(s) IV Push every 6 hours PRN Agitation  sodium chloride 0.9% lock flush 10 milliLiter(s) IV Push every 1 hour PRN Pre/post blood products, medications, blood draw, and to maintain line patency  sodium chloride 0.9% lock flush 10 milliLiter(s) IV Push every 1 hour PRN Pre/post blood products, medications, blood draw, and to maintain line patency      Allergies    No Known Allergies    Intolerances        Vital Signs Last 24 Hrs  T(C): 37.1 (07 May 2021 16:40), Max: 37.6 (07 May 2021 12:00)  T(F): 98.8 (07 May 2021 16:40), Max: 99.7 (07 May 2021 12:00)  HR: 79 (07 May 2021 20:40) (72 - 135)  BP: 112/48 (07 May 2021 19:00) (86/43 - 156/73)  BP(mean): 63 (07 May 2021 19:00) (53 - 93)  RR: 34 (07 May 2021 19:00) (16 - 46)  SpO2: 100% (07 May 2021 20:40) (88% - 100%)    PHYSICAL EXAM  General: adult in NAD  HEENT: clear oropharynx, anicteric sclera, pink conjunctiva  Neck: supple  CV: normal S1/S2 with no murmur rubs or gallops  Lungs: positive air movement b/l ant lungs,clear to auscultation, no wheezes, no rales  Abdomen: soft non-tender non-distended, no hepatosplenomegaly  Ext: no clubbing cyanosis or edema  Skin: no rashes and no petechiae  Neuro: alert and oriented X 4, no focal deficits  LABS:                          8.2    9.42  )-----------( 316      ( 07 May 2021 15:12 )             24.0         Mean Cell Volume : 108.6 fl  Mean Cell Hemoglobin : 37.1 pg  Mean Cell Hemoglobin Concentration : 34.2 gm/dL  Auto Neutrophil # : x  Auto Lymphocyte # : x  Auto Monocyte # : x  Auto Eosinophil # : x  Auto Basophil # : x  Auto Neutrophil % : x  Auto Lymphocyte % : x  Auto Monocyte % : x  Auto Eosinophil % : x  Auto Basophil % : x    Serial CBC  Hematocrit 24.0  Hemoglobin 8.2  Plat 316  RBC 2.21  WBC 9.42  Serial CBC  Hematocrit 19.1  Hemoglobin 7.0  Plat 254  RBC 1.73  WBC 6.65  Serial CBC  Hematocrit 22.7  Hemoglobin 7.1  Plat 277  RBC 2.24  WBC 10.73  Serial CBC  Hematocrit 23.4  Hemoglobin 7.4  Plat 309  RBC 2.31  WBC 9.85  Serial CBC  Hematocrit 29.8  Hemoglobin 8.8  Plat 381  RBC 2.84  WBC 9.73    05-07    141  |  100  |  12  ----------------------------<  108<H>  3.3<L>   |  42<H>  |  <0.20<L>    Ca    8.0<L>      07 May 2021 05:09  Phos  3.0     05-07  Mg     1.8     05-07    TPro  5.4<L>  /  Alb  1.9<L>  /  TBili  0.6  /  DBili  x   /  AST  13  /  ALT  17  /  AlkPhos  69  05-07      PT/INR - ( 06 May 2021 03:45 )   PT: 15.3 sec;   INR: 1.30 ratio         PTT - ( 06 May 2021 03:45 )  PTT:32.1 sec    Reticulocyte Percent: 2.9 % (05-07 @ 05:09)  Vitamin B12, Serum: 983 pg/mL (05-06 @ 08:49)  Ferritin, Serum: 749 ng/mL (05-03 @ 09:28)  Iron - Total Binding Capacity.: 192 ug/dL (05-03 @ 04:30)            BLOOD SMEAR INTERPRETATION:       RADIOLOGY & ADDITIONAL STUDIES:

## 2021-05-07 NOTE — PROGRESS NOTE ADULT - ATTENDING COMMENTS
55 yr old  man , non smoker with  moody 1990s presented 3/14 with x9 days worsening cough, subjective fevers, and SOB, with x2-3 days dysuria and central, non-radiating, constant CP. Admitted to medicine unit  for acute hypoxic respiratory failure secondary to pna from covid-19 infection .     Assessment:  1. Acute hypoxic respiratory failure  2. Covid-19 infection   3. Transaminitis  4. Prediabetes  5. Bilateral pneumothorax  6. Septic shock - resolved  7. Anemia    Plan   -Noted with improvement in left PTX s/p left anterior chest tube placement, no obvious air leak noted   -Transfuse to hgb > 7  -S/p tracheostomy placement 4/21   -S/p G-tube 4/23  -Continue tube feedings  -Cont. mechanical ventilation   -Daily awakening trials   -Daily weaning trials  -Chest tube to suction  -Cont. prednisone 40 mg for possible organizing pneumonia as evidence on CT scan  -Albumin and Lasix for diuresis, aim for negative fluid balance  -Restart midodrine as now hypotensive  -Fentanyl path  -Cont. Clonazepam and titrate down Precedex, though becomes tachycardic and tachypneic when off precedex  -methadone for opioid dependence  -PT evaluation  -dvt/gi prophy  -hemodynamic monitoring   -Prognosis is guarded

## 2021-05-07 NOTE — PROGRESS NOTE ADULT - ASSESSMENT
· Assessment	  55 year old male with COVID pneumonia has been on vent.  he also has CT for Pnx.  His H/H has dropped recently without obvious bleeding.  he is off antibiotics.    1. worsening pneumonia  no blood in ET tube, unlikely intrapulmonary bleeding    2. anemia, retic is elevated.  Haptoglobin normal  ?bleeding  arms are very swollen and ecchymotic  ?bleeding in arms  abdomen is not distended  retic is normal now  Hb continue to trend down  ?sepsis    3. hemolysis is unlikely even his baseline haptoglobin may be very elevated due to COVID  but will check direct pamella  COVID is known to cause cold agglutinin  direct pamella is neg

## 2021-05-07 NOTE — PROGRESS NOTE ADULT - ASSESSMENT
Assessment and plan: 56 y/o M with no PMH is admitted to ICU for AHRF 2/2 covid19 pna     1. Acute hypoxic respiratory failure  2. ARDS 2/2 Covid pneumonia  3. Pneumothorax  4. Prediabetes  5. Abnormal TSH    =================== Neuro============================  -Alert and oriented x 3 at baseline   -Intubated and sedated 3/29 on Vent  -Trach 4/22 continues on Vent    -Continue Precedex and off Fentanyl drip, adjusting precedex as per requirement  Patient was agitated today, tachypneic. Will give ketamine  -Was started on [id number] mg BID, will slowly titrate down  - fentanyl patch was d/c but later was restarted   -Started on Ativan PRN, Not helping much   -Off versed, propofol  -Started on klonopin  -CT head unremarkable     ================= Cardiovascular==========================  # Hypotension improving  Off pressors    # TACHYCARDIA: recurrent episodes   -HR in 110's  -Continue monitoring     ================- Pulm=================================  #Acute hypoxic respiratory failure: secondary to Covid19 pneumonia  #ARDS  -Was on HFNC then got intubated 3/29  -D-dimer initially elevated on presentation, trending down    -s/p Nimbex and Epifanio at different occasions for vent synchrony  -s/p Pigtail on left chest, removed on 4/15  -Large Pneumothorax was noted on 4/18 on left side, s/p chest tube placement to suction since 4/27, still has pneumothorax-   -Trach 4/22 continues on Vent   -Remdesivir was discontinued due to positive antibodies   - Finished Dexamethasone   - Prednisone taper Finished  - Covid19 PCR negative now, off isolation   - C/w prednisone 40 daily for possible organizing pneumonia   - Started on bactrim empirically, TIW  - Chest tube was kinked, which was fixed by surgery, pneumothorax improved after that  - Plan for anterior chest tube placement today  -       # Bacterial Pneumonia  - Stable infiltrate on CXR ,likely developed Bacterial pneumonia , Finished ABx Zosyn, meropenem, s/p 1 dose of Vancomycin  - MRSA negative from 3/26  - Sputum Cx growing few gram negative rods, CRE  - Secretions from the trach, needs frequent suctions   - Pulm. Dr. Ryan  - ID Dr Anand       #Pneumothorax and pneumomediastinum:   -X-ray chest: remains with left pneumothorax  -Thoracic surgery following  -s/p Chest tube placed 4/8 pigtail to wall suction discontinued on 4/15  -On 4/18 chest tube was placed back on left lung to treat pneumothorax-  chest tube to suction    -CXR 4/26 still shows left lung pneumothorax - CXR 4/29 persistent pneumothorax     -CXR shows persistent PTX, CT chest shows mild persistent PTX  -Plan for  anterior chest tube.      ==================ID===================================  Likely bacterial pneumonia   -leukocytosis resolved  -No more fever, On Tylenol PRN only   -Finished Meropenem  -plan as above     Spiked temp of 101.5 on 5/5  Will send in cultures if spikes again  Will send UA    ================= Nephro================================  -Pitting edema to both arms, ecchymosis on right AC, blisters on left arm around brachial area    -Was retaining urine, andrea was plaed 5/5  -monitor I/Os , good urine output overall  -Will start on albumin and one dose of lasix      =================GI====================================  Transaminitis:   likely secondary to Covid19   - and  on presentation   hepatitis panel -ve  -Improved, now normalized   -continue to monitor LFT    Diet:   S/p NGT (3/29) with tube feed  C/w bowel regimen   G tube 4/23, on tube feeds    Had diarrhea, resolved  c/w TF      ================ Heme==================================  Elevated d-dimer: likely secondary to Covid19   -D-dimer 423 on admission  -Last D-dimer 1434  - No active bleeding, restarted lovenox daily    Anemia  On 4/26 Hb 8.8 dropped to 6.8, s/p 1 unit of PRBC repeat hb 7.2  On 4/27 Hb 7, s/p 2 units of PRBC hb remains 7  On 4/28 Hb 6.8, s/p 1 unit of PRBC hb 7.5 now  4 PRBC total    CTH and CT abdomen w/o contrast no evidence of bleed    No active signs of bleeding (No hematemesis, melena or hematuria)  F/u hemolysis w/u- negative so far, elevated reticulocytes   Dr Andrade on board   CBC on 5/3 was 7.7  Will Monitor  F/u CBC daily     =================Endocrine===============================  Prediabetes:  -A1c 5.8  -BS controlled  -continue HSS    Abnormal TSH:  -TSH level noted 0.26,   -Repeat TSH 0.37 and Free T4 1.82    ================= Skin/Catheters============================  No rashes. Peripheral IV lines.   Both arms with pitting edema, right AC with ecchymosis and left AC with blisters     - =================Prophylaxis =============================  On Lovenox for DVT   Protonix for GI proph    ==================GOC==================================   FULL CODE

## 2021-05-07 NOTE — PROGRESS NOTE ADULT - SUBJECTIVE AND OBJECTIVE BOX
Patient is a 55y old  Male who presents with a chief complaint of SOB (07 May 2021 09:04)  Patient remains on ventilator via trach. Awake but not alert. Tachypneic but not in major distress    INTERVAL HPI/OVERNIGHT EVENTS:      VITAL SIGNS:  T(F): 98.1 (21 @ 08:00)  HR: 129 (21 @ 10:30)  BP: 144/76 (21 @ 10:00)  RR: 29 (21 @ 10:30)  SpO2: 96% (21 @ 10:30)  Wt(kg): --  I&O's Detail    06 May 2021 07:  -  07 May 2021 07:00  --------------------------------------------------------  IN:    Albumin 5%  - 250 mL: 200 mL    Dexmedetomidine: 524 mL    Osmolite 1.2: 210 mL    Sodium Chloride 0.9% Bolus: 500 mL  Total IN: 1434 mL    OUT:    Chest Tube (mL): 60 mL    Indwelling Catheter - Urethral (mL): 2565 mL  Total OUT: 2625 mL    Total NET: -1191 mL      07 May 2021 07:  -  07 May 2021 10:52  --------------------------------------------------------  IN:    Dexmedetomidine: 46 mL    Enteral Tube Flush: 40 mL    Osmolite 1.2: 90 mL  Total IN: 176 mL    OUT:    Indwelling Catheter - Urethral (mL): 46 mL  Total OUT: 46 mL    Total NET: 130 mL        Mode: AC/ CMV (Assist Control/ Continuous Mandatory Ventilation)  RR (machine): 20  TV (machine): 400  FiO2: 40  PEEP: 5  ITime: 0.7  MAP: 11  PIP: 32        REVIEW OF SYSTEMS:    CONSTITUTIONAL:  No fevers, chills, sweats    HEENT:  Eyes:  No diplopia or blurred vision. ENT:  No earache, sore throat or runny nose.    CARDIOVASCULAR:  No pressure, squeezing, tightness, or heaviness about the chest; no palpitations.    RESPIRATORY:  Per HPI    GASTROINTESTINAL:  No abdominal pain, nausea, vomiting or diarrhea.    GENITOURINARY:  No dysuria, frequency or urgency.    NEUROLOGIC:  No paresthesias, fasciculations, seizures or weakness.    PSYCHIATRIC:  No disorder of thought or mood.      PHYSICAL EXAM:    Constitutional: Well developed and nourished  Eyes:Perrla  ENMT: normal  Neck:supple  Respiratory: Ronchi bilaterally  Cardiovascular: S1 S2 regular  Gastrointestinal: Soft, Non tender  Extremities: No edema  Vascular:normal  Neurological:Awake, alert,Ox3  Musculoskeletal:Normal      MEDICATIONS  (STANDING):  ascorbic acid 1000 milliGRAM(s) Oral daily  BACItracin   Ointment 1 Application(s) Topical two times a day  chlorhexidine 0.12% Liquid 15 milliLiter(s) Oral Mucosa every 12 hours  chlorhexidine 2% Cloths 1 Application(s) Topical <User Schedule>  clonazePAM  Tablet 1 milliGRAM(s) Oral every 8 hours  dexMEDEtomidine Infusion 0.2 MICROgram(s)/kG/Hr (4.2 mL/Hr) IV Continuous <Continuous>  enoxaparin Injectable 40 milliGRAM(s) SubCutaneous every 24 hours  insulin lispro (ADMELOG) corrective regimen sliding scale   SubCutaneous every 6 hours  methadone    Tablet 10 milliGRAM(s) Oral every 8 hours  OLANZapine 5 milliGRAM(s) Oral two times a day  pantoprazole  Injectable 40 milliGRAM(s) IV Push every 12 hours  polyethylene glycol 3350 17 Gram(s) Oral two times a day  predniSONE   Tablet 40 milliGRAM(s) Oral daily  tamsulosin 0.4 milliGRAM(s) Oral at bedtime  trimethoprim  160 mG/sulfamethoxazole 800 mG 1 Tablet(s) Oral <User Schedule>    MEDICATIONS  (PRN):  acetaminophen    Suspension .. 650 milliGRAM(s) Oral every 12 hours PRN Temp greater or equal to 38C (100.4F)  ALBUTerol    90 MICROgram(s) HFA Inhaler 2 Puff(s) Inhalation every 6 hours PRN Shortness of Breath and/or Wheezing  artificial  tears Solution 1 Drop(s) Both EYES every 4 hours PRN Dry Eyes  LORazepam   Injectable 2 milliGRAM(s) IV Push every 6 hours PRN Agitation  sodium chloride 0.9% lock flush 10 milliLiter(s) IV Push every 1 hour PRN Pre/post blood products, medications, blood draw, and to maintain line patency  sodium chloride 0.9% lock flush 10 milliLiter(s) IV Push every 1 hour PRN Pre/post blood products, medications, blood draw, and to maintain line patency      Allergies    No Known Allergies    Intolerances        LABS:                        7.0    6.65  )-----------( 254      ( 07 May 2021 05:09 )             19.1     05-07    141  |  100  |  12  ----------------------------<  108<H>  3.3<L>   |  42<H>  |  <0.20<L>    Ca    8.0<L>      07 May 2021 05:09  Phos  3.0     05-07  Mg     1.8     -07    TPro  5.4<L>  /  Alb  1.9<L>  /  TBili  0.6  /  DBili  x   /  AST  13  /  ALT  17  /  AlkPhos  69  05-07    PT/INR - ( 06 May 2021 03:45 )   PT: 15.3 sec;   INR: 1.30 ratio         PTT - ( 06 May 2021 03:45 )  PTT:32.1 sec  Urinalysis Basic - ( 07 May 2021 09:07 )    Color: Yellow / Appearance: Clear / S.020 / pH: x  Gluc: x / Ketone: Moderate  / Bili: Small / Urobili: 12   Blood: x / Protein: 30 mg/dL / Nitrite: Positive   Leuk Esterase: Trace / RBC: 10-25 /HPF / WBC 3-5 /HPF   Sq Epi: x / Non Sq Epi: x / Bacteria: Moderate /HPF      ABG - ( 07 May 2021 08:28 )  pH, Arterial: 7.42  pH, Blood: x     /  pCO2: 65    /  pO2: 98    / HCO3: 42    / Base Excess: 14.5  /  SaO2: 99                    CAPILLARY BLOOD GLUCOSE      POCT Blood Glucose.: 146 mg/dL (06 May 2021 23:04)  POCT Blood Glucose.: 153 mg/dL (06 May 2021 16:24)    pro-bnp --  @ 03:49     d-dimer 2819   @ 03:49      RADIOLOGY & ADDITIONAL TESTS:    CXR:  < from: Xray Chest 1 View-PORTABLE IMMEDIATE (Xray Chest 1 View-PORTABLE IMMEDIATE .) (21 @ 20:10) >  IMPRESSION: Left jugular line inserted.    Persistent advanced infiltrates.    < end of copied text >    Ct scan chest:    ekg;    echo:

## 2021-05-07 NOTE — PROGRESS NOTE ADULT - ASSESSMENT
55 year old male s/p left anterior pigtail chest tube 5/6 and left pigtail chest tube 4/18. S/p Tracheostomy 4/21    - continue pigtails x 2 to suction   - daily CXR  - vent management per ICU   - will discuss with Dr. Lanier

## 2021-05-07 NOTE — PROGRESS NOTE ADULT - SUBJECTIVE AND OBJECTIVE BOX
S/p left anterior pigtail chest tube 5/6 and left pigtail chest tube 4/18. S/p Tracheostomy 4/21  Patient seen and examined at bedside in ICU    Vital Signs Last 24 Hrs  T(F): 98.1 (05-07-21 @ 08:00), Max: 100.2 (05-06-21 @ 12:00)  HR: 80 (05-07-21 @ 08:00)  BP: 89/49 (05-07-21 @ 08:00)  RR: 19 (05-07-21 @ 08:00)  SpO2: 96% (05-07-21 @ 08:00)  POCT Blood Glucose.: 146 mg/dL (06 May 2021 23:04)    GENERAL: NAD  CHEST/LUNG: on vent via trach, left anterior pigtail with small air leak which is not continuous. left pigtail chest tube in place, no airleak. PEEP 5, [id number]%  ABDOMEN: Gastrostomy in place with tube feeds running at 10ml/24hours, midline packing changed.   EXTREMITIES:  no calf tenderness, No edema    I&O's Detail    06 May 2021 07:01  -  07 May 2021 07:00  --------------------------------------------------------  IN:    Albumin 5%  - 250 mL: 200 mL    Dexmedetomidine: 524 mL    Osmolite 1.2: 210 mL    Sodium Chloride 0.9% Bolus: 500 mL  Total IN: 1434 mL    OUT:    Chest Tube (mL): 60 mL    Indwelling Catheter - Urethral (mL): 2565 mL  Total OUT: 2625 mL    Total NET: -1191 mL    07 May 2021 07:01  -  07 May 2021 09:05  --------------------------------------------------------  IN:    Dexmedetomidine: 46 mL    Osmolite 1.2: 50 mL  Total IN: 96 mL    OUT:    Indwelling Catheter - Urethral (mL): 46 mL  Total OUT: 46 mL    Total NET: 50 mL    LABS:                        7.0    6.65  )-----------( 254      ( 07 May 2021 05:09 )             19.1     05-07    141  |  100  |  12  ----------------------------<  108<H>  3.3<L>   |  42<H>  |  <0.20<L>    Ca    8.0<L>      07 May 2021 05:09  Phos  3.0     05-07  Mg     1.8     05-07    TPro  5.4<L>  /  Alb  1.9<L>  /  TBili  0.6  /  DBili  x   /  AST  13  /  ALT  17  /  AlkPhos  69  05-07    PT/INR - ( 06 May 2021 03:45 )   PT: 15.3 sec;   INR: 1.30 ratio       PTT - ( 06 May 2021 03:45 )  PTT:32.1 sec    RADIOLOGY & ADDITIONAL STUDIES:  < from: Xray Chest 1 View-PORTABLE IMMEDIATE (Xray Chest 1 View-PORTABLE IMMEDIATE .) (05.06.21 @ 20:10) >  EXAM:  XR CHEST PORTABLE IMMED 1V                          PROCEDURE DATE:  05/06/2021      INTERPRETATION:  AP semierect chest on May 6, 2021 at 7:20 PM. Patient had left jugular line placement.    Heart magnified by technique. Tracheostomy and catheter left chest tubes remain.    There are significant diffuse advanced infiltrates again noted.    No pneumothorax.    Above findings are similar to study earlier in the day.    Present film shows a left jugular line inserted with tip entering the superior vena cava.    IMPRESSION: Left jugular line inserted.    Persistent advanced infiltrates.    CARMEN FARRELL M.D., ATTENDING RADIOLOGIST  This document has been electronically signed. May  7 2021  8:32AM    < end of copied text >    Today's AM CXR pending results, appears grossly stable

## 2021-05-07 NOTE — PROGRESS NOTE ADULT - PROBLEM SELECTOR PLAN 1
isolation precautions DC  Cont mechanical ventilation - S.P trach.   Wean as tolerated  Tracheal care  Pulmonary toilet  Decubitus prevention  Supplemental nutrition  ICU management.   Monitor oxygen sat  Monitor LFT, LDH, CRP, D-Dimer, Ferritin and procalcitonin  Vit C, D and zinc supp  Montelukast 10 mgs po Qhs  Daily CXR   G-tube with feeds  Replace lytes  Pulmonary toilet  DVT and GI PPX

## 2021-05-07 NOTE — PROGRESS NOTE ADULT - SUBJECTIVE AND OBJECTIVE BOX
INTERVAL HPI/OVERNIGHT EVENTS: ***    PRESSORS: [ ] YES [ ] NO  WHICH:        Antimicrobial:  trimethoprim  160 mG/sulfamethoxazole 800 mG 1 Tablet(s) Oral <User Schedule>    Cardiovascular:  tamsulosin 0.4 milliGRAM(s) Oral at bedtime    Pulmonary:  ALBUTerol    90 MICROgram(s) HFA Inhaler 2 Puff(s) Inhalation every 6 hours PRN    Hematalogic:  enoxaparin Injectable 40 milliGRAM(s) SubCutaneous every 24 hours    Other:  acetaminophen    Suspension .. 650 milliGRAM(s) Oral every 12 hours PRN  artificial  tears Solution 1 Drop(s) Both EYES every 4 hours PRN  ascorbic acid 1000 milliGRAM(s) Oral daily  BACItracin   Ointment 1 Application(s) Topical two times a day  chlorhexidine 0.12% Liquid 15 milliLiter(s) Oral Mucosa every 12 hours  chlorhexidine 2% Cloths 1 Application(s) Topical <User Schedule>  clonazePAM  Tablet 1 milliGRAM(s) Oral every 8 hours  dexMEDEtomidine Infusion 0.2 MICROgram(s)/kG/Hr IV Continuous <Continuous>  HYDROmorphone  Injectable 1 milliGRAM(s) IV Push every 8 hours  insulin lispro (ADMELOG) corrective regimen sliding scale   SubCutaneous every 6 hours  LORazepam   Injectable 4 milliGRAM(s) IV Push every 6 hours PRN  methadone    Tablet 10 milliGRAM(s) Oral every 8 hours  OLANZapine 5 milliGRAM(s) Oral two times a day  pantoprazole  Injectable 40 milliGRAM(s) IV Push every 12 hours  polyethylene glycol 3350 17 Gram(s) Oral two times a day  potassium chloride   Powder 40 milliEquivalent(s) Oral every 4 hours  predniSONE   Tablet 40 milliGRAM(s) Oral daily  sodium chloride 0.9% lock flush 10 milliLiter(s) IV Push every 1 hour PRN  sodium chloride 0.9% lock flush 10 milliLiter(s) IV Push every 1 hour PRN    acetaminophen    Suspension .. 650 milliGRAM(s) Oral every 12 hours PRN  ALBUTerol    90 MICROgram(s) HFA Inhaler 2 Puff(s) Inhalation every 6 hours PRN  artificial  tears Solution 1 Drop(s) Both EYES every 4 hours PRN  ascorbic acid 1000 milliGRAM(s) Oral daily  BACItracin   Ointment 1 Application(s) Topical two times a day  chlorhexidine 0.12% Liquid 15 milliLiter(s) Oral Mucosa every 12 hours  chlorhexidine 2% Cloths 1 Application(s) Topical <User Schedule>  clonazePAM  Tablet 1 milliGRAM(s) Oral every 8 hours  dexMEDEtomidine Infusion 0.2 MICROgram(s)/kG/Hr IV Continuous <Continuous>  enoxaparin Injectable 40 milliGRAM(s) SubCutaneous every 24 hours  HYDROmorphone  Injectable 1 milliGRAM(s) IV Push every 8 hours  insulin lispro (ADMELOG) corrective regimen sliding scale   SubCutaneous every 6 hours  LORazepam   Injectable 4 milliGRAM(s) IV Push every 6 hours PRN  methadone    Tablet 10 milliGRAM(s) Oral every 8 hours  OLANZapine 5 milliGRAM(s) Oral two times a day  pantoprazole  Injectable 40 milliGRAM(s) IV Push every 12 hours  polyethylene glycol 3350 17 Gram(s) Oral two times a day  potassium chloride   Powder 40 milliEquivalent(s) Oral every 4 hours  predniSONE   Tablet 40 milliGRAM(s) Oral daily  sodium chloride 0.9% lock flush 10 milliLiter(s) IV Push every 1 hour PRN  sodium chloride 0.9% lock flush 10 milliLiter(s) IV Push every 1 hour PRN  tamsulosin 0.4 milliGRAM(s) Oral at bedtime  trimethoprim  160 mG/sulfamethoxazole 800 mG 1 Tablet(s) Oral <User Schedule>    Drug Dosing Weight  Height (cm): 167.6 (23 Apr 2021 23:15)  Weight (kg): 83.9 (23 Apr 2021 23:15)  BMI (kg/m2): 29.9 (23 Apr 2021 23:15)  BSA (m2): 1.93 (23 Apr 2021 23:15)    CENTRAL LINE: [ ] YES [ ] NO  LOCATION:   DATE INSERTED:  REMOVE: [ ] YES [ ] NO  EXPLAIN:    RIVERA: [ ] YES [ ] NO    DATE INSERTED:  REMOVE:  [ ] YES [ ] NO  EXPLAIN:    A-LINE:  [ ] YES [ ] NO  LOCATION:   DATE INSERTED:  REMOVE:  [ ] YES [ ] NO  EXPLAIN:        ICU Vital Signs Last 24 Hrs  T(C): 36.9 (07 May 2021 04:00), Max: 37.9 (06 May 2021 12:00)  T(F): 98.4 (07 May 2021 04:00), Max: 100.2 (06 May 2021 12:00)  HR: 86 (07 May 2021 07:00) (78 - 121)  BP: 90/45 (07 May 2021 07:00) (86/43 - 156/73)  BP(mean): 56 (07 May 2021 07:00) (53 - 93)  ABP: --  ABP(mean): --  RR: 18 (07 May 2021 07:00) (16 - 46)  SpO2: 94% (07 May 2021 07:00) (88% - 100%)      ABG - ( 07 May 2021 04:25 )  pH, Arterial: 7.38  pH, Blood: x     /  pCO2: 72    /  pO2: 130   / HCO3: 43    / Base Excess: 14.0  /  SaO2: 99                    05-06 @ 07:01  -  05-07 @ 07:00  --------------------------------------------------------  IN: 1393 mL / OUT: 2600 mL / NET: -1207 mL        Mode: AC/ CMV (Assist Control/ Continuous Mandatory Ventilation)  RR (machine): 16  TV (machine): 400  FiO2: 50  PEEP: 5  ITime: 0.7  MAP: 9  PIP: 32      PHYSICAL EXAM:    GENERAL: NAD, well-groomed, well-developed  HEAD:  Atraumatic, Normocephalic  EYES: EOMI, PERRLA, conjunctiva and sclera clear  ENMT: No tonsillar erythema, exudates, or enlargement; Moist mucous membranes, Good dentition, No lesions  NECK: Supple, normal appearance, No JVD; Normal thyroid; Trachea midline  NERVOUS SYSTEM:  Alert & Oriented X3, Good concentration; Motor Strength 5/5 B/L upper and lower extremities; DTRs 2+ intact and symmetric  CHEST/LUNG: No chest deformity; Normal percussion bilaterally; No rales, rhonchi, wheezing   HEART: Regular rate and rhythm; No murmurs, rubs, or gallops  ABDOMEN: Soft, Nontender, Nondistended; Bowel sounds present  EXTREMITIES:  2+ Peripheral Pulses, No clubbing, cyanosis, or edema  LYMPH: No lymphadenopathy noted  SKIN: No rashes or lesions; Good capillary refill      LABS:  CBC Full  -  ( 07 May 2021 05:09 )  WBC Count : 6.65 K/uL  RBC Count : 1.73 M/uL  Hemoglobin : 7.0 g/dL  Hematocrit : 19.1 %  Platelet Count - Automated : 254 K/uL  Mean Cell Volume : 110.4 fl  Mean Cell Hemoglobin : 40.5 pg  Mean Cell Hemoglobin Concentration : 36.6 gm/dL  Auto Neutrophil # : 4.79 K/uL  Auto Lymphocyte # : 1.25 K/uL  Auto Monocyte # : 0.48 K/uL  Auto Eosinophil # : 0.08 K/uL  Auto Basophil # : 0.01 K/uL  Auto Neutrophil % : 72.0 %  Auto Lymphocyte % : 18.8 %  Auto Monocyte % : 7.2 %  Auto Eosinophil % : 1.2 %  Auto Basophil % : 0.2 %    05-07    141  |  100  |  12  ----------------------------<  108<H>  3.3<L>   |  42<H>  |  <0.20<L>    Ca    8.0<L>      07 May 2021 05:09  Phos  3.0     05-07  Mg     1.8     05-07    TPro  5.4<L>  /  Alb  1.9<L>  /  TBili  0.6  /  DBili  x   /  AST  13  /  ALT  17  /  AlkPhos  69  05-07    PT/INR - ( 06 May 2021 03:45 )   PT: 15.3 sec;   INR: 1.30 ratio         PTT - ( 06 May 2021 03:45 )  PTT:32.1 sec        RADIOLOGY & ADDITIONAL STUDIES REVIEWED:  ***    [ ]GOALS OF CARE DISCUSSION WITH PATIENT/FAMILY/PROXY:    CRITICAL CARE TIME SPENT: 35 minutes

## 2021-05-07 NOTE — PROGRESS NOTE ADULT - SUBJECTIVE AND OBJECTIVE BOX
INTERVAL HPI/OVERNIGHT EVENTS: Patient was hypotensive overnight, was given 500cc bolus, has been having low UO  Hb trended down to 7    PRESSORS: [ ] YES [ x] NO  WHICH:    ANTIBIOTICS:                      Antimicrobial:  trimethoprim  160 mG/sulfamethoxazole 800 mG 1 Tablet(s) Oral <User Schedule>    Cardiovascular:  tamsulosin 0.4 milliGRAM(s) Oral at bedtime    Pulmonary:  ALBUTerol    90 MICROgram(s) HFA Inhaler 2 Puff(s) Inhalation every 6 hours PRN    Hematalogic:  enoxaparin Injectable 40 milliGRAM(s) SubCutaneous every 24 hours    Other:  acetaminophen    Suspension .. 650 milliGRAM(s) Oral every 12 hours PRN  artificial  tears Solution 1 Drop(s) Both EYES every 4 hours PRN  ascorbic acid 1000 milliGRAM(s) Oral daily  BACItracin   Ointment 1 Application(s) Topical two times a day  chlorhexidine 0.12% Liquid 15 milliLiter(s) Oral Mucosa every 12 hours  chlorhexidine 2% Cloths 1 Application(s) Topical <User Schedule>  clonazePAM  Tablet 1 milliGRAM(s) Oral every 8 hours  dexMEDEtomidine Infusion 0.2 MICROgram(s)/kG/Hr IV Continuous <Continuous>  insulin lispro (ADMELOG) corrective regimen sliding scale   SubCutaneous every 6 hours  LORazepam   Injectable 2 milliGRAM(s) IV Push every 6 hours PRN  methadone    Tablet 10 milliGRAM(s) Oral every 8 hours  OLANZapine 5 milliGRAM(s) Oral two times a day  pantoprazole  Injectable 40 milliGRAM(s) IV Push every 12 hours  polyethylene glycol 3350 17 Gram(s) Oral two times a day  predniSONE   Tablet 40 milliGRAM(s) Oral daily  sodium chloride 0.9% lock flush 10 milliLiter(s) IV Push every 1 hour PRN  sodium chloride 0.9% lock flush 10 milliLiter(s) IV Push every 1 hour PRN    acetaminophen    Suspension .. 650 milliGRAM(s) Oral every 12 hours PRN  ALBUTerol    90 MICROgram(s) HFA Inhaler 2 Puff(s) Inhalation every 6 hours PRN  artificial  tears Solution 1 Drop(s) Both EYES every 4 hours PRN  ascorbic acid 1000 milliGRAM(s) Oral daily  BACItracin   Ointment 1 Application(s) Topical two times a day  chlorhexidine 0.12% Liquid 15 milliLiter(s) Oral Mucosa every 12 hours  chlorhexidine 2% Cloths 1 Application(s) Topical <User Schedule>  clonazePAM  Tablet 1 milliGRAM(s) Oral every 8 hours  dexMEDEtomidine Infusion 0.2 MICROgram(s)/kG/Hr IV Continuous <Continuous>  enoxaparin Injectable 40 milliGRAM(s) SubCutaneous every 24 hours  insulin lispro (ADMELOG) corrective regimen sliding scale   SubCutaneous every 6 hours  LORazepam   Injectable 2 milliGRAM(s) IV Push every 6 hours PRN  methadone    Tablet 10 milliGRAM(s) Oral every 8 hours  OLANZapine 5 milliGRAM(s) Oral two times a day  pantoprazole  Injectable 40 milliGRAM(s) IV Push every 12 hours  polyethylene glycol 3350 17 Gram(s) Oral two times a day  predniSONE   Tablet 40 milliGRAM(s) Oral daily  sodium chloride 0.9% lock flush 10 milliLiter(s) IV Push every 1 hour PRN  sodium chloride 0.9% lock flush 10 milliLiter(s) IV Push every 1 hour PRN  tamsulosin 0.4 milliGRAM(s) Oral at bedtime  trimethoprim  160 mG/sulfamethoxazole 800 mG 1 Tablet(s) Oral <User Schedule>    Drug Dosing Weight  Height (cm): 167.6 (2021 23:15)  Weight (kg): 83.9 (2021 23:15)  BMI (kg/m2): 29.9 (2021 23:15)  BSA (m2): 1.93 (2021 23:15)    CENTRAL LINE: [ ] YES [ ] NO  LOCATION:   DATE INSERTED:  REMOVE: [ ] YES [ ] NO  EXPLAIN:    RIVERA: [ ] YES [ ] NO    DATE INSERTED:  REMOVE:  [ ] YES [ ] NO  EXPLAIN:    A-LINE:  [ ] YES [ ] NO  LOCATION:   DATE INSERTED:  REMOVE:  [ ] YES [ ] NO  EXPLAIN:    PMH -reviewed admission note, no change since admission    ICU Vital Signs Last 24 Hrs  T(C): 36.7 (07 May 2021 08:00), Max: 37.9 (06 May 2021 12:00)  T(F): 98.1 (07 May 2021 08:00), Max: 100.2 (06 May 2021 12:00)  HR: 129 (07 May 2021 10:30) (77 - 134)  BP: 144/76 (07 May 2021 10:00) (86/43 - 156/73)  BP(mean): 92 (07 May 2021 10:00) (53 - 93)  ABP: --  ABP(mean): --  RR: 29 (07 May 2021 10:30) (16 - 46)  SpO2: 96% (07 May 2021 10:30) (88% - 100%)      ABG - ( 07 May 2021 08:28 )  pH, Arterial: 7.42  pH, Blood: x     /  pCO2: 65    /  pO2: 98    / HCO3: 42    / Base Excess: 14.5  /  SaO2: 99                    05- @ 07:01  -   @ 07:00  --------------------------------------------------------  IN: 1434 mL / OUT: 2625 mL / NET: -1191 mL        Mode: AC/ CMV (Assist Control/ Continuous Mandatory Ventilation)  RR (machine): 20  TV (machine): 400  FiO2: 40  PEEP: 5  ITime: 0.7  MAP: 11  PIP: 32      PHYSICAL EXAM:  GENERAL: tachypneic trach to vent sat above 97%, agitated  EYES: EOMI, PERRLA  NECK: Supple, No JVD; Normal thyroid; Trachea midline: No LAD, trach    NERVOUS SYSTEM: Awake, alert, able to follow commands  CHEST/LUNG: diffuse ronchi  HEART: Regular rate and rhythm; No murmurs, no gallops  ABDOMEN: Soft, Nontender, Nondistended; Bowel sounds present, no pain or masses on palpation, G tube in place- functional, no signs of infection, dressing is not wet today  : condom cath in place  EXTREMITIES:  2+ Peripheral Pulses, No clubbing or cyanosis. Bilateral upper extremity edema with ecchymosis   SKIN: warm, intact, no lesions      LABS:  CBC Full  -  ( 07 May 2021 05:09 )  WBC Count : 6.65 K/uL  RBC Count : 1.73 M/uL  Hemoglobin : 7.0 g/dL  Hematocrit : 19.1 %  Platelet Count - Automated : 254 K/uL  Mean Cell Volume : 110.4 fl  Mean Cell Hemoglobin : 40.5 pg  Mean Cell Hemoglobin Concentration : 36.6 gm/dL  Auto Neutrophil # : 4.79 K/uL  Auto Lymphocyte # : 1.25 K/uL  Auto Monocyte # : 0.48 K/uL  Auto Eosinophil # : 0.08 K/uL  Auto Basophil # : 0.01 K/uL  Auto Neutrophil % : 72.0 %  Auto Lymphocyte % : 18.8 %  Auto Monocyte % : 7.2 %  Auto Eosinophil % : 1.2 %  Auto Basophil % : 0.2 %        141  |  100  |  12  ----------------------------<  108<H>  3.3<L>   |  42<H>  |  <0.20<L>    Ca    8.0<L>      07 May 2021 05:09  Phos  3.0       Mg     1.8         TPro  5.4<L>  /  Alb  1.9<L>  /  TBili  0.6  /  DBili  x   /  AST  13  /  ALT  17  /  AlkPhos  69  05-07    PT/INR - ( 06 May 2021 03:45 )   PT: 15.3 sec;   INR: 1.30 ratio         PTT - ( 06 May 2021 03:45 )  PTT:32.1 sec  Urinalysis Basic - ( 07 May 2021 09:07 )    Color: Yellow / Appearance: Clear / S.020 / pH: x  Gluc: x / Ketone: Moderate  / Bili: Small / Urobili: 12   Blood: x / Protein: 30 mg/dL / Nitrite: Positive   Leuk Esterase: Trace / RBC: 10-25 /HPF / WBC 3-5 /HPF   Sq Epi: x / Non Sq Epi: x / Bacteria: Moderate /HPF          RADIOLOGY & ADDITIONAL STUDIES REVIEWED:  ***    GOALS OF CARE DISCUSSION WITH PATIENT/FAMILY/PROXY:    CRITICAL CARE TIME SPENT: 35 minutes INTERVAL HPI/OVERNIGHT EVENTS: Patient was hypotensive overnight, was given 500cc bolus, has been having low UO  Hb trended down to 7    PRESSORS: [ ] YES [ x] NO     WHICH:    ANTIBIOTICS:                      Antimicrobial:  trimethoprim  160 mG/sulfamethoxazole 800 mG 1 Tablet(s) Oral <User Schedule>    Cardiovascular:  tamsulosin 0.4 milliGRAM(s) Oral at bedtime    Pulmonary:  ALBUTerol    90 MICROgram(s) HFA Inhaler 2 Puff(s) Inhalation every 6 hours PRN    Hematalogic:  enoxaparin Injectable 40 milliGRAM(s) SubCutaneous every 24 hours    Other:  acetaminophen    Suspension .. 650 milliGRAM(s) Oral every 12 hours PRN  artificial  tears Solution 1 Drop(s) Both EYES every 4 hours PRN  ascorbic acid 1000 milliGRAM(s) Oral daily  BACItracin   Ointment 1 Application(s) Topical two times a day  chlorhexidine 0.12% Liquid 15 milliLiter(s) Oral Mucosa every 12 hours  chlorhexidine 2% Cloths 1 Application(s) Topical <User Schedule>  clonazePAM  Tablet 1 milliGRAM(s) Oral every 8 hours  dexMEDEtomidine Infusion 0.2 MICROgram(s)/kG/Hr IV Continuous <Continuous>  insulin lispro (ADMELOG) corrective regimen sliding scale   SubCutaneous every 6 hours  LORazepam   Injectable 2 milliGRAM(s) IV Push every 6 hours PRN  methadone    Tablet 10 milliGRAM(s) Oral every 8 hours  OLANZapine 5 milliGRAM(s) Oral two times a day  pantoprazole  Injectable 40 milliGRAM(s) IV Push every 12 hours  polyethylene glycol 3350 17 Gram(s) Oral two times a day  predniSONE   Tablet 40 milliGRAM(s) Oral daily  sodium chloride 0.9% lock flush 10 milliLiter(s) IV Push every 1 hour PRN  sodium chloride 0.9% lock flush 10 milliLiter(s) IV Push every 1 hour PRN    acetaminophen    Suspension .. 650 milliGRAM(s) Oral every 12 hours PRN  ALBUTerol    90 MICROgram(s) HFA Inhaler 2 Puff(s) Inhalation every 6 hours PRN  artificial  tears Solution 1 Drop(s) Both EYES every 4 hours PRN  ascorbic acid 1000 milliGRAM(s) Oral daily  BACItracin   Ointment 1 Application(s) Topical two times a day  chlorhexidine 0.12% Liquid 15 milliLiter(s) Oral Mucosa every 12 hours  chlorhexidine 2% Cloths 1 Application(s) Topical <User Schedule>  clonazePAM  Tablet 1 milliGRAM(s) Oral every 8 hours  dexMEDEtomidine Infusion 0.2 MICROgram(s)/kG/Hr IV Continuous <Continuous>  enoxaparin Injectable 40 milliGRAM(s) SubCutaneous every 24 hours  insulin lispro (ADMELOG) corrective regimen sliding scale   SubCutaneous every 6 hours  LORazepam   Injectable 2 milliGRAM(s) IV Push every 6 hours PRN  methadone    Tablet 10 milliGRAM(s) Oral every 8 hours  OLANZapine 5 milliGRAM(s) Oral two times a day  pantoprazole  Injectable 40 milliGRAM(s) IV Push every 12 hours  polyethylene glycol 3350 17 Gram(s) Oral two times a day  predniSONE   Tablet 40 milliGRAM(s) Oral daily  sodium chloride 0.9% lock flush 10 milliLiter(s) IV Push every 1 hour PRN  sodium chloride 0.9% lock flush 10 milliLiter(s) IV Push every 1 hour PRN  tamsulosin 0.4 milliGRAM(s) Oral at bedtime  trimethoprim  160 mG/sulfamethoxazole 800 mG 1 Tablet(s) Oral <User Schedule>    Drug Dosing Weight  Height (cm): 167.6 (2021 23:15)  Weight (kg): 83.9 (2021 23:15)  BMI (kg/m2): 29.9 (2021 23:15)  BSA (m2): 1.93 (2021 23:15)    CENTRAL LINE: [ ] YES [ ] NO  LOCATION:   DATE INSERTED:  REMOVE: [ ] YES [ ] NO  EXPLAIN:    RIVERA: [ ] YES [ ] NO    DATE INSERTED:  REMOVE:  [ ] YES [ ] NO  EXPLAIN:    A-LINE:  [ ] YES [ ] NO  LOCATION:   DATE INSERTED:  REMOVE:  [ ] YES [ ] NO  EXPLAIN:    PMH -reviewed admission note, no change since admission    ICU Vital Signs Last 24 Hrs  T(C): 36.7 (07 May 2021 08:00), Max: 37.9 (06 May 2021 12:00)  T(F): 98.1 (07 May 2021 08:00), Max: 100.2 (06 May 2021 12:00)  HR: 129 (07 May 2021 10:30) (77 - 134)  BP: 144/76 (07 May 2021 10:00) (86/43 - 156/73)  BP(mean): 92 (07 May 2021 10:00) (53 - 93)  ABP: --  ABP(mean): --  RR: 29 (07 May 2021 10:30) (16 - 46)  SpO2: 96% (07 May 2021 10:30) (88% - 100%)      ABG - ( 07 May 2021 08:28 )  pH, Arterial: 7.42  pH, Blood: x     /  pCO2: 65    /  pO2: 98    / HCO3: 42    / Base Excess: 14.5  /  SaO2: 99                    05- @ 07:01  -   @ 07:00  --------------------------------------------------------  IN: 1434 mL / OUT: 2625 mL / NET: -1191 mL        Mode: AC/ CMV (Assist Control/ Continuous Mandatory Ventilation)  RR (machine): 20  TV (machine): 400  FiO2: 40  PEEP: 5  ITime: 0.7  MAP: 11  PIP: 32      PHYSICAL EXAM:  GENERAL: tachypneic trach to vent sat above 97%, agitated  EYES: EOMI, PERRLA  NECK: Supple, No JVD; Normal thyroid; Trachea midline: No LAD, trach    NERVOUS SYSTEM: Awake, alert, able to follow commands  CHEST/LUNG: diffuse ronchi  HEART: Regular rate and rhythm; No murmurs, no gallops  ABDOMEN: Soft, Nontender, Nondistended; Bowel sounds present, no pain or masses on palpation, G tube in place- functional, no signs of infection, dressing is not wet today  : condom cath in place  EXTREMITIES:  2+ Peripheral Pulses, No clubbing or cyanosis. Bilateral upper extremity edema with ecchymosis   SKIN: warm, intact, no lesions      LABS:  CBC Full  -  ( 07 May 2021 05:09 )  WBC Count : 6.65 K/uL  RBC Count : 1.73 M/uL  Hemoglobin : 7.0 g/dL  Hematocrit : 19.1 %  Platelet Count - Automated : 254 K/uL  Mean Cell Volume : 110.4 fl  Mean Cell Hemoglobin : 40.5 pg  Mean Cell Hemoglobin Concentration : 36.6 gm/dL  Auto Neutrophil # : 4.79 K/uL  Auto Lymphocyte # : 1.25 K/uL  Auto Monocyte # : 0.48 K/uL  Auto Eosinophil # : 0.08 K/uL  Auto Basophil # : 0.01 K/uL  Auto Neutrophil % : 72.0 %  Auto Lymphocyte % : 18.8 %  Auto Monocyte % : 7.2 %  Auto Eosinophil % : 1.2 %  Auto Basophil % : 0.2 %        141  |  100  |  12  ----------------------------<  108<H>  3.3<L>   |  42<H>  |  <0.20<L>    Ca    8.0<L>      07 May 2021 05:09  Phos  3.0       Mg     1.8         TPro  5.4<L>  /  Alb  1.9<L>  /  TBili  0.6  /  DBili  x   /  AST  13  /  ALT  17  /  AlkPhos  69  05-07    PT/INR - ( 06 May 2021 03:45 )   PT: 15.3 sec;   INR: 1.30 ratio         PTT - ( 06 May 2021 03:45 )  PTT:32.1 sec  Urinalysis Basic - ( 07 May 2021 09:07 )    Color: Yellow / Appearance: Clear / S.020 / pH: x  Gluc: x / Ketone: Moderate  / Bili: Small / Urobili: 12   Blood: x / Protein: 30 mg/dL / Nitrite: Positive   Leuk Esterase: Trace / RBC: 10-25 /HPF / WBC 3-5 /HPF   Sq Epi: x / Non Sq Epi: x / Bacteria: Moderate /HPF          RADIOLOGY & ADDITIONAL STUDIES REVIEWED:  ***    GOALS OF CARE DISCUSSION WITH PATIENT/FAMILY/PROXY:    CRITICAL CARE TIME SPENT: 35 minutes

## 2021-05-08 LAB
ALBUMIN SERPL ELPH-MCNC: 2.4 G/DL — LOW (ref 3.5–5)
ALP SERPL-CCNC: 85 U/L — SIGNIFICANT CHANGE UP (ref 40–120)
ALT FLD-CCNC: 18 U/L DA — SIGNIFICANT CHANGE UP (ref 10–60)
ANION GAP SERPL CALC-SCNC: -2 MMOL/L — LOW (ref 5–17)
ANION GAP SERPL CALC-SCNC: 1 MMOL/L — LOW (ref 5–17)
AST SERPL-CCNC: 12 U/L — SIGNIFICANT CHANGE UP (ref 10–40)
BASE EXCESS BLDA CALC-SCNC: 17.6 MMOL/L — HIGH (ref -2–3)
BASOPHILS # BLD AUTO: 0.02 K/UL — SIGNIFICANT CHANGE UP (ref 0–0.2)
BASOPHILS NFR BLD AUTO: 0.2 % — SIGNIFICANT CHANGE UP (ref 0–2)
BILIRUB SERPL-MCNC: 0.8 MG/DL — SIGNIFICANT CHANGE UP (ref 0.2–1.2)
BLOOD GAS COMMENTS ARTERIAL: SIGNIFICANT CHANGE UP
BUN SERPL-MCNC: 11 MG/DL — SIGNIFICANT CHANGE UP (ref 7–18)
BUN SERPL-MCNC: 13 MG/DL — SIGNIFICANT CHANGE UP (ref 7–18)
CALCIUM SERPL-MCNC: 7.9 MG/DL — LOW (ref 8.4–10.5)
CALCIUM SERPL-MCNC: 8.3 MG/DL — LOW (ref 8.4–10.5)
CHLORIDE SERPL-SCNC: 97 MMOL/L — SIGNIFICANT CHANGE UP (ref 96–108)
CHLORIDE SERPL-SCNC: 97 MMOL/L — SIGNIFICANT CHANGE UP (ref 96–108)
CO2 SERPL-SCNC: 41 MMOL/L — HIGH (ref 22–31)
CO2 SERPL-SCNC: 43 MMOL/L — HIGH (ref 22–31)
CREAT SERPL-MCNC: 0.33 MG/DL — LOW (ref 0.5–1.3)
CREAT SERPL-MCNC: 0.35 MG/DL — LOW (ref 0.5–1.3)
EOSINOPHIL # BLD AUTO: 0.06 K/UL — SIGNIFICANT CHANGE UP (ref 0–0.5)
EOSINOPHIL NFR BLD AUTO: 0.6 % — SIGNIFICANT CHANGE UP (ref 0–6)
GLUCOSE BLDC GLUCOMTR-MCNC: 107 MG/DL — HIGH (ref 70–99)
GLUCOSE BLDC GLUCOMTR-MCNC: 155 MG/DL — HIGH (ref 70–99)
GLUCOSE BLDC GLUCOMTR-MCNC: 171 MG/DL — HIGH (ref 70–99)
GLUCOSE BLDC GLUCOMTR-MCNC: 177 MG/DL — HIGH (ref 70–99)
GLUCOSE BLDC GLUCOMTR-MCNC: 185 MG/DL — HIGH (ref 70–99)
GLUCOSE SERPL-MCNC: 152 MG/DL — HIGH (ref 70–99)
GLUCOSE SERPL-MCNC: 172 MG/DL — HIGH (ref 70–99)
HCO3 BLDA-SCNC: 46 MMOL/L — CRITICAL HIGH (ref 21–28)
HCT VFR BLD CALC: 27.1 % — LOW (ref 39–50)
HGB BLD-MCNC: 8.2 G/DL — LOW (ref 13–17)
HOROWITZ INDEX BLDA+IHG-RTO: 60 — SIGNIFICANT CHANGE UP
IMM GRANULOCYTES NFR BLD AUTO: 0.7 % — SIGNIFICANT CHANGE UP (ref 0–1.5)
LYMPHOCYTES # BLD AUTO: 3.55 K/UL — HIGH (ref 1–3.3)
LYMPHOCYTES # BLD AUTO: 33.8 % — SIGNIFICANT CHANGE UP (ref 13–44)
MAGNESIUM SERPL-MCNC: 1.6 MG/DL — SIGNIFICANT CHANGE UP (ref 1.6–2.6)
MAGNESIUM SERPL-MCNC: 2.2 MG/DL — SIGNIFICANT CHANGE UP (ref 1.6–2.6)
MCHC RBC-ENTMCNC: 30.3 GM/DL — LOW (ref 32–36)
MCHC RBC-ENTMCNC: 30.9 PG — SIGNIFICANT CHANGE UP (ref 27–34)
MCV RBC AUTO: 102.3 FL — HIGH (ref 80–100)
MONOCYTES # BLD AUTO: 0.55 K/UL — SIGNIFICANT CHANGE UP (ref 0–0.9)
MONOCYTES NFR BLD AUTO: 5.2 % — SIGNIFICANT CHANGE UP (ref 2–14)
NEUTROPHILS # BLD AUTO: 6.24 K/UL — SIGNIFICANT CHANGE UP (ref 1.8–7.4)
NEUTROPHILS NFR BLD AUTO: 59.5 % — SIGNIFICANT CHANGE UP (ref 43–77)
NRBC # BLD: 0 /100 WBCS — SIGNIFICANT CHANGE UP (ref 0–0)
PCO2 BLDA: 73 MMHG — CRITICAL HIGH (ref 35–48)
PH BLDA: 7.41 — SIGNIFICANT CHANGE UP (ref 7.35–7.45)
PHOSPHATE SERPL-MCNC: 2.1 MG/DL — LOW (ref 2.5–4.5)
PHOSPHATE SERPL-MCNC: 2.6 MG/DL — SIGNIFICANT CHANGE UP (ref 2.5–4.5)
PLATELET # BLD AUTO: 352 K/UL — SIGNIFICANT CHANGE UP (ref 150–400)
PO2 BLDA: 69 MMHG — LOW (ref 83–108)
POTASSIUM SERPL-MCNC: 3.4 MMOL/L — LOW (ref 3.5–5.3)
POTASSIUM SERPL-MCNC: 4.3 MMOL/L — SIGNIFICANT CHANGE UP (ref 3.5–5.3)
POTASSIUM SERPL-SCNC: 3.4 MMOL/L — LOW (ref 3.5–5.3)
POTASSIUM SERPL-SCNC: 4.3 MMOL/L — SIGNIFICANT CHANGE UP (ref 3.5–5.3)
PROT SERPL-MCNC: 6.4 G/DL — SIGNIFICANT CHANGE UP (ref 6–8.3)
RBC # BLD: 2.65 M/UL — LOW (ref 4.2–5.8)
RBC # FLD: 16.4 % — HIGH (ref 10.3–14.5)
SAO2 % BLDA: 94 % — SIGNIFICANT CHANGE UP
SODIUM SERPL-SCNC: 138 MMOL/L — SIGNIFICANT CHANGE UP (ref 135–145)
SODIUM SERPL-SCNC: 139 MMOL/L — SIGNIFICANT CHANGE UP (ref 135–145)
WBC # BLD: 10.49 K/UL — SIGNIFICANT CHANGE UP (ref 3.8–10.5)
WBC # FLD AUTO: 10.49 K/UL — SIGNIFICANT CHANGE UP (ref 3.8–10.5)

## 2021-05-08 PROCEDURE — 71045 X-RAY EXAM CHEST 1 VIEW: CPT | Mod: 26

## 2021-05-08 RX ORDER — ALBUMIN HUMAN 25 %
50 VIAL (ML) INTRAVENOUS EVERY 8 HOURS
Refills: 0 | Status: COMPLETED | OUTPATIENT
Start: 2021-05-08 | End: 2021-05-10

## 2021-05-08 RX ORDER — MIDODRINE HYDROCHLORIDE 2.5 MG/1
10 TABLET ORAL EVERY 8 HOURS
Refills: 0 | Status: DISCONTINUED | OUTPATIENT
Start: 2021-05-08 | End: 2021-05-11

## 2021-05-08 RX ORDER — MIDODRINE HYDROCHLORIDE 2.5 MG/1
10 TABLET ORAL EVERY 8 HOURS
Refills: 0 | Status: DISCONTINUED | OUTPATIENT
Start: 2021-05-08 | End: 2021-05-08

## 2021-05-08 RX ORDER — CLONAZEPAM 1 MG
2 TABLET ORAL EVERY 8 HOURS
Refills: 0 | Status: DISCONTINUED | OUTPATIENT
Start: 2021-05-08 | End: 2021-05-08

## 2021-05-08 RX ORDER — OLANZAPINE 15 MG/1
10 TABLET, FILM COATED ORAL EVERY 12 HOURS
Refills: 0 | Status: DISCONTINUED | OUTPATIENT
Start: 2021-05-08 | End: 2021-05-10

## 2021-05-08 RX ORDER — MAGNESIUM SULFATE 500 MG/ML
2 VIAL (ML) INJECTION ONCE
Refills: 0 | Status: COMPLETED | OUTPATIENT
Start: 2021-05-08 | End: 2021-05-08

## 2021-05-08 RX ORDER — SODIUM CHLORIDE 9 MG/ML
500 INJECTION INTRAMUSCULAR; INTRAVENOUS; SUBCUTANEOUS ONCE
Refills: 0 | Status: COMPLETED | OUTPATIENT
Start: 2021-05-08 | End: 2021-05-08

## 2021-05-08 RX ORDER — METHADONE HYDROCHLORIDE 40 MG/1
5 TABLET ORAL EVERY 8 HOURS
Refills: 0 | Status: DISCONTINUED | OUTPATIENT
Start: 2021-05-08 | End: 2021-05-08

## 2021-05-08 RX ORDER — SODIUM CHLORIDE 9 MG/ML
500 INJECTION INTRAMUSCULAR; INTRAVENOUS; SUBCUTANEOUS ONCE
Refills: 0 | Status: DISCONTINUED | OUTPATIENT
Start: 2021-05-08 | End: 2021-05-08

## 2021-05-08 RX ORDER — ACETAMINOPHEN 500 MG
650 TABLET ORAL EVERY 12 HOURS
Refills: 0 | Status: DISCONTINUED | OUTPATIENT
Start: 2021-05-08 | End: 2021-05-30

## 2021-05-08 RX ORDER — OLANZAPINE 15 MG/1
10 TABLET, FILM COATED ORAL EVERY 12 HOURS
Refills: 0 | Status: DISCONTINUED | OUTPATIENT
Start: 2021-05-08 | End: 2021-05-08

## 2021-05-08 RX ORDER — TAMSULOSIN HYDROCHLORIDE 0.4 MG/1
0.4 CAPSULE ORAL AT BEDTIME
Refills: 0 | Status: DISCONTINUED | OUTPATIENT
Start: 2021-05-08 | End: 2021-05-11

## 2021-05-08 RX ORDER — CLONAZEPAM 1 MG
2 TABLET ORAL EVERY 8 HOURS
Refills: 0 | Status: DISCONTINUED | OUTPATIENT
Start: 2021-05-08 | End: 2021-05-12

## 2021-05-08 RX ORDER — POTASSIUM PHOSPHATE, MONOBASIC POTASSIUM PHOSPHATE, DIBASIC 236; 224 MG/ML; MG/ML
15 INJECTION, SOLUTION INTRAVENOUS ONCE
Refills: 0 | Status: COMPLETED | OUTPATIENT
Start: 2021-05-08 | End: 2021-05-08

## 2021-05-08 RX ORDER — ASCORBIC ACID 60 MG
1000 TABLET,CHEWABLE ORAL DAILY
Refills: 0 | Status: DISCONTINUED | OUTPATIENT
Start: 2021-05-08 | End: 2021-06-11

## 2021-05-08 RX ORDER — POTASSIUM CHLORIDE 20 MEQ
40 PACKET (EA) ORAL EVERY 4 HOURS
Refills: 0 | Status: COMPLETED | OUTPATIENT
Start: 2021-05-08 | End: 2021-05-08

## 2021-05-08 RX ORDER — PHENYLEPHRINE HYDROCHLORIDE 10 MG/ML
0.1 INJECTION INTRAVENOUS
Qty: 160 | Refills: 0 | Status: DISCONTINUED | OUTPATIENT
Start: 2021-05-08 | End: 2021-05-09

## 2021-05-08 RX ORDER — POLYETHYLENE GLYCOL 3350 17 G/17G
17 POWDER, FOR SOLUTION ORAL
Refills: 0 | Status: DISCONTINUED | OUTPATIENT
Start: 2021-05-08 | End: 2021-05-17

## 2021-05-08 RX ORDER — METHADONE HYDROCHLORIDE 40 MG/1
5 TABLET ORAL EVERY 8 HOURS
Refills: 0 | Status: DISCONTINUED | OUTPATIENT
Start: 2021-05-08 | End: 2021-05-10

## 2021-05-08 RX ADMIN — ENOXAPARIN SODIUM 40 MILLIGRAM(S): 100 INJECTION SUBCUTANEOUS at 11:06

## 2021-05-08 RX ADMIN — Medication 2 MILLIGRAM(S): at 15:37

## 2021-05-08 RX ADMIN — FENTANYL CITRATE 1 PATCH: 50 INJECTION INTRAVENOUS at 05:58

## 2021-05-08 RX ADMIN — PANTOPRAZOLE SODIUM 40 MILLIGRAM(S): 20 TABLET, DELAYED RELEASE ORAL at 05:05

## 2021-05-08 RX ADMIN — Medication 40 MILLIEQUIVALENT(S): at 06:07

## 2021-05-08 RX ADMIN — Medication 2 MILLIGRAM(S): at 22:39

## 2021-05-08 RX ADMIN — Medication 1: at 11:06

## 2021-05-08 RX ADMIN — Medication 50 MILLILITER(S): at 22:40

## 2021-05-08 RX ADMIN — Medication 1: at 18:05

## 2021-05-08 RX ADMIN — OLANZAPINE 10 MILLIGRAM(S): 15 TABLET, FILM COATED ORAL at 17:41

## 2021-05-08 RX ADMIN — CHLORHEXIDINE GLUCONATE 15 MILLILITER(S): 213 SOLUTION TOPICAL at 05:03

## 2021-05-08 RX ADMIN — MIDODRINE HYDROCHLORIDE 10 MILLIGRAM(S): 2.5 TABLET ORAL at 22:39

## 2021-05-08 RX ADMIN — Medication 1 MILLIGRAM(S): at 19:00

## 2021-05-08 RX ADMIN — SODIUM CHLORIDE 1000 MILLILITER(S): 9 INJECTION INTRAMUSCULAR; INTRAVENOUS; SUBCUTANEOUS at 02:00

## 2021-05-08 RX ADMIN — METHADONE HYDROCHLORIDE 5 MILLIGRAM(S): 40 TABLET ORAL at 14:16

## 2021-05-08 RX ADMIN — DEXMEDETOMIDINE HYDROCHLORIDE IN 0.9% SODIUM CHLORIDE 4.2 MICROGRAM(S)/KG/HR: 4 INJECTION INTRAVENOUS at 02:04

## 2021-05-08 RX ADMIN — Medication 2 MILLIGRAM(S): at 15:39

## 2021-05-08 RX ADMIN — CHLORHEXIDINE GLUCONATE 15 MILLILITER(S): 213 SOLUTION TOPICAL at 17:40

## 2021-05-08 RX ADMIN — Medication 2 MILLIGRAM(S): at 03:17

## 2021-05-08 RX ADMIN — Medication 1 APPLICATION(S): at 05:02

## 2021-05-08 RX ADMIN — Medication 1 MILLIGRAM(S): at 04:24

## 2021-05-08 RX ADMIN — Medication 50 MILLILITER(S): at 14:16

## 2021-05-08 RX ADMIN — DEXMEDETOMIDINE HYDROCHLORIDE IN 0.9% SODIUM CHLORIDE 4.2 MICROGRAM(S)/KG/HR: 4 INJECTION INTRAVENOUS at 15:00

## 2021-05-08 RX ADMIN — POTASSIUM PHOSPHATE, MONOBASIC POTASSIUM PHOSPHATE, DIBASIC 62.5 MILLIMOLE(S): 236; 224 INJECTION, SOLUTION INTRAVENOUS at 17:46

## 2021-05-08 RX ADMIN — TAMSULOSIN HYDROCHLORIDE 0.4 MILLIGRAM(S): 0.4 CAPSULE ORAL at 22:39

## 2021-05-08 RX ADMIN — Medication 40 MILLIGRAM(S): at 17:41

## 2021-05-08 RX ADMIN — OLANZAPINE 5 MILLIGRAM(S): 15 TABLET, FILM COATED ORAL at 05:02

## 2021-05-08 RX ADMIN — MIDODRINE HYDROCHLORIDE 10 MILLIGRAM(S): 2.5 TABLET ORAL at 11:47

## 2021-05-08 RX ADMIN — Medication 1: at 06:23

## 2021-05-08 RX ADMIN — POLYETHYLENE GLYCOL 3350 17 GRAM(S): 17 POWDER, FOR SOLUTION ORAL at 05:03

## 2021-05-08 RX ADMIN — Medication 40 MILLIGRAM(S): at 05:03

## 2021-05-08 RX ADMIN — DEXMEDETOMIDINE HYDROCHLORIDE IN 0.9% SODIUM CHLORIDE 4.2 MICROGRAM(S)/KG/HR: 4 INJECTION INTRAVENOUS at 10:00

## 2021-05-08 RX ADMIN — CHLORHEXIDINE GLUCONATE 1 APPLICATION(S): 213 SOLUTION TOPICAL at 05:04

## 2021-05-08 RX ADMIN — Medication 1 APPLICATION(S): at 17:40

## 2021-05-08 RX ADMIN — Medication 40 MILLIEQUIVALENT(S): at 10:31

## 2021-05-08 RX ADMIN — METHADONE HYDROCHLORIDE 5 MILLIGRAM(S): 40 TABLET ORAL at 22:39

## 2021-05-08 RX ADMIN — MIDODRINE HYDROCHLORIDE 5 MILLIGRAM(S): 2.5 TABLET ORAL at 05:02

## 2021-05-08 RX ADMIN — DEXMEDETOMIDINE HYDROCHLORIDE IN 0.9% SODIUM CHLORIDE 4.2 MICROGRAM(S)/KG/HR: 4 INJECTION INTRAVENOUS at 21:28

## 2021-05-08 RX ADMIN — METHADONE HYDROCHLORIDE 10 MILLIGRAM(S): 40 TABLET ORAL at 04:24

## 2021-05-08 RX ADMIN — Medication 2 MILLIGRAM(S): at 04:23

## 2021-05-08 RX ADMIN — Medication 1000 MILLIGRAM(S): at 11:48

## 2021-05-08 RX ADMIN — Medication 50 GRAM(S): at 10:31

## 2021-05-08 RX ADMIN — PANTOPRAZOLE SODIUM 40 MILLIGRAM(S): 20 TABLET, DELAYED RELEASE ORAL at 17:44

## 2021-05-08 RX ADMIN — POLYETHYLENE GLYCOL 3350 17 GRAM(S): 17 POWDER, FOR SOLUTION ORAL at 17:42

## 2021-05-08 NOTE — PROGRESS NOTE ADULT - SUBJECTIVE AND OBJECTIVE BOX
INTERVAL HPI/OVERNIGHT EVENTS:    No acute events overnight.   sedated/intubated        MEDICATIONS  (STANDING):  ascorbic acid 1000 milliGRAM(s) Oral daily  BACItracin   Ointment 1 Application(s) Topical two times a day  chlorhexidine 0.12% Liquid 15 milliLiter(s) Oral Mucosa every 12 hours  chlorhexidine 2% Cloths 1 Application(s) Topical <User Schedule>  clonazePAM  Tablet 2 milliGRAM(s) Oral every 8 hours  dexMEDEtomidine Infusion 0.2 MICROgram(s)/kG/Hr (4.2 mL/Hr) IV Continuous <Continuous>  enoxaparin Injectable 40 milliGRAM(s) SubCutaneous every 24 hours  furosemide   Injectable 40 milliGRAM(s) IV Push every 12 hours  insulin lispro (ADMELOG) corrective regimen sliding scale   SubCutaneous every 6 hours  methadone    Tablet 10 milliGRAM(s) Oral every 8 hours  midodrine 10 milliGRAM(s) Oral every 8 hours  OLANZapine 10 milliGRAM(s) Oral every 12 hours  pantoprazole  Injectable 40 milliGRAM(s) IV Push every 12 hours  polyethylene glycol 3350 17 Gram(s) Oral two times a day  potassium chloride   Powder 40 milliEquivalent(s) Oral every 4 hours  predniSONE   Tablet 40 milliGRAM(s) Oral daily  tamsulosin 0.4 milliGRAM(s) Oral at bedtime  trimethoprim  160 mG/sulfamethoxazole 800 mG 1 Tablet(s) Oral <User Schedule>    MEDICATIONS  (PRN):  acetaminophen    Suspension .. 650 milliGRAM(s) Oral every 12 hours PRN Temp greater or equal to 38C (100.4F)  ALBUTerol    90 MICROgram(s) HFA Inhaler 2 Puff(s) Inhalation every 6 hours PRN Shortness of Breath and/or Wheezing  artificial  tears Solution 1 Drop(s) Both EYES every 4 hours PRN Dry Eyes  LORazepam   Injectable 2 milliGRAM(s) IV Push every 6 hours PRN Agitation  sodium chloride 0.9% lock flush 10 milliLiter(s) IV Push every 1 hour PRN Pre/post blood products, medications, blood draw, and to maintain line patency  sodium chloride 0.9% lock flush 10 milliLiter(s) IV Push every 1 hour PRN Pre/post blood products, medications, blood draw, and to maintain line patency      Vital Signs Last 24 Hrs  T(C): 36.8 (08 May 2021 08:00), Max: 37.9 (08 May 2021 00:16)  T(F): 98.2 (08 May 2021 08:00), Max: 100.2 (08 May 2021 00:16)  HR: 79 (08 May 2021 08:33) (72 - 135)  BP: 87/53 (08 May 2021 08:00) (83/37 - 144/76)  BP(mean): 60 (08 May 2021 08:00) (42 - 92)  RR: 20 (08 May 2021 08:00) (17 - 43)  SpO2: 100% (08 May 2021 08:33) (90% - 100%)      PHYSICAL EXAM  General: sedated,  not in acute distress  Resp: intubated  Chest: L anterior pigtail no airleak; posterior CT no airleak on wall suction  Abdomen: Soft, nondistended, G tube in place securely on feeds, prior seroma i&d site packed with WTD      I&O's Detail    07 May 2021 07:01  -  08 May 2021 07:00  --------------------------------------------------------  IN:    Dexmedetomidine: 632 mL    Enteral Tube Flush: 190 mL    Free Water: 750 mL    Osmolite 1.2: 810 mL    Sodium Chloride 0.9% Bolus: 1000 mL  Total IN: 3382 mL    OUT:    Chest Tube (mL): 95 mL    Chest Tube (mL): 5 mL    Indwelling Catheter - Urethral (mL): 3206 mL  Total OUT: 3306 mL    Total NET: 76 mL      08 May 2021 07:01  -  08 May 2021 09:09  --------------------------------------------------------  IN:    Dexmedetomidine: 31.5 mL  Total IN: 31.5 mL    OUT:    Indwelling Catheter - Urethral (mL): 450 mL  Total OUT: 450 mL    Total NET: -418.5 mL          LABS:                        8.2    10.49 )-----------( 352      ( 08 May 2021 04:25 )             27.1             05-08    139  |  97  |  11  ----------------------------<  152<H>  3.4<L>   |  41<H>  |  0.35<L>    Ca    8.3<L>      08 May 2021 04:25  Phos  2.6     05-08  Mg     1.6     05-08    TPro  6.4  /  Alb  2.4<L>  /  TBili  0.8  /  DBili  x   /  AST  12  /  ALT  18  /  AlkPhos  85  05-08

## 2021-05-08 NOTE — PROGRESS NOTE ADULT - SUBJECTIVE AND OBJECTIVE BOX
Patient is a 55y old  Male who presents with a chief complaint of SOB (08 May 2021 09:09)    Patient remains on ventilator via trach.  Sedated. Not acute distress, clinically unchanged.  Less tachypneic.      INTERVAL HPI/OVERNIGHT EVENTS:      VITAL SIGNS:  T(F): 98.2 (21 @ 08:00)  HR: 79 (21 @ 08:33)  BP: 87/53 (21 @ 08:00)  RR: 20 (21 @ 08:00)  SpO2: 100% (21 @ 08:33)  Wt(kg): --  I&O's Detail    07 May 2021 07:01  -  08 May 2021 07:00  --------------------------------------------------------  IN:    Dexmedetomidine: 632 mL    Enteral Tube Flush: 190 mL    Free Water: 750 mL    Osmolite 1.2: 810 mL    Sodium Chloride 0.9% Bolus: 1000 mL  Total IN: 3382 mL    OUT:    Chest Tube (mL): 95 mL    Chest Tube (mL): 5 mL    Indwelling Catheter - Urethral (mL): 3206 mL  Total OUT: 3306 mL    Total NET: 76 mL      08 May 2021 07:01  -  08 May 2021 10:00  --------------------------------------------------------  IN:    Dexmedetomidine: 31.5 mL  Total IN: 31.5 mL    OUT:    Indwelling Catheter - Urethral (mL): 450 mL  Total OUT: 450 mL    Total NET: -418.5 mL        Mode: AC/ CMV (Assist Control/ Continuous Mandatory Ventilation)  RR (machine): 20  TV (machine): 400  FiO2: 60  PEEP: 5  ITime: 1  MAP: 11  PIP: 33        REVIEW OF SYSTEMS:    CONSTITUTIONAL:  No fevers, chills, sweats    HEENT:  Eyes:  No diplopia or blurred vision. ENT:  No earache, sore throat or runny nose.    CARDIOVASCULAR:  No pressure, squeezing, tightness, or heaviness about the chest; no palpitations.    RESPIRATORY:  Per HPI    GASTROINTESTINAL:  No abdominal pain, nausea, vomiting or diarrhea.    GENITOURINARY:  No dysuria, frequency or urgency.    NEUROLOGIC:  No paresthesias, fasciculations, seizures or weakness.    PSYCHIATRIC:  No disorder of thought or mood.      PHYSICAL EXAM:    Constitutional: Well developed and nourished  Eyes:Perrla  ENMT: normal  Neck:supple  Respiratory: Ronchi bilaterally  Cardiovascular: S1 S2 regular  Gastrointestinal: Soft, Non tender  Extremities: No edema  Vascular:normal  Neurological: sedated   Musculoskeletal:Normal      MEDICATIONS  (STANDING):  ascorbic acid 1000 milliGRAM(s) Oral daily  BACItracin   Ointment 1 Application(s) Topical two times a day  chlorhexidine 0.12% Liquid 15 milliLiter(s) Oral Mucosa every 12 hours  chlorhexidine 2% Cloths 1 Application(s) Topical <User Schedule>  clonazePAM  Tablet 2 milliGRAM(s) Oral every 8 hours  dexMEDEtomidine Infusion 0.2 MICROgram(s)/kG/Hr (4.2 mL/Hr) IV Continuous <Continuous>  enoxaparin Injectable 40 milliGRAM(s) SubCutaneous every 24 hours  furosemide   Injectable 40 milliGRAM(s) IV Push every 12 hours  insulin lispro (ADMELOG) corrective regimen sliding scale   SubCutaneous every 6 hours  methadone    Tablet 10 milliGRAM(s) Oral every 8 hours  midodrine 10 milliGRAM(s) Oral every 8 hours  OLANZapine 10 milliGRAM(s) Oral every 12 hours  pantoprazole  Injectable 40 milliGRAM(s) IV Push every 12 hours  polyethylene glycol 3350 17 Gram(s) Oral two times a day  potassium chloride   Powder 40 milliEquivalent(s) Oral every 4 hours  predniSONE   Tablet 40 milliGRAM(s) Oral daily  tamsulosin 0.4 milliGRAM(s) Oral at bedtime  trimethoprim  160 mG/sulfamethoxazole 800 mG 1 Tablet(s) Oral <User Schedule>    MEDICATIONS  (PRN):  acetaminophen    Suspension .. 650 milliGRAM(s) Oral every 12 hours PRN Temp greater or equal to 38C (100.4F)  ALBUTerol    90 MICROgram(s) HFA Inhaler 2 Puff(s) Inhalation every 6 hours PRN Shortness of Breath and/or Wheezing  artificial  tears Solution 1 Drop(s) Both EYES every 4 hours PRN Dry Eyes  LORazepam   Injectable 2 milliGRAM(s) IV Push every 6 hours PRN Agitation  sodium chloride 0.9% lock flush 10 milliLiter(s) IV Push every 1 hour PRN Pre/post blood products, medications, blood draw, and to maintain line patency  sodium chloride 0.9% lock flush 10 milliLiter(s) IV Push every 1 hour PRN Pre/post blood products, medications, blood draw, and to maintain line patency      Allergies    No Known Allergies    Intolerances        LABS:                        8.2    10.49 )-----------( 352      ( 08 May 2021 04:25 )             27.1     05-08    139  |  97  |  11  ----------------------------<  152<H>  3.4<L>   |  41<H>  |  0.35<L>    Ca    8.3<L>      08 May 2021 04:25  Phos  2.6     05-08  Mg     1.6     -08    TPro  6.4  /  Alb  2.4<L>  /  TBili  0.8  /  DBili  x   /  AST  12  /  ALT  18  /  AlkPhos  85  05-08      Urinalysis Basic - ( 07 May 2021 09:07 )    Color: Yellow / Appearance: Clear / S.020 / pH: x  Gluc: x / Ketone: Moderate  / Bili: Small / Urobili: 12   Blood: x / Protein: 30 mg/dL / Nitrite: Positive   Leuk Esterase: Trace / RBC: 10-25 /HPF / WBC 3-5 /HPF   Sq Epi: x / Non Sq Epi: x / Bacteria: Moderate /HPF      ABG - ( 08 May 2021 04:32 )  pH, Arterial: 7.41  pH, Blood: x     /  pCO2: 73    /  pO2: 69    / HCO3: 46    / Base Excess: 17.6  /  SaO2: 94                    CAPILLARY BLOOD GLUCOSE      POCT Blood Glucose.: 155 mg/dL (08 May 2021 06:21)  POCT Blood Glucose.: 153 mg/dL (07 May 2021 23:29)  POCT Blood Glucose.: 173 mg/dL (07 May 2021 16:02)  POCT Blood Glucose.: 161 mg/dL (07 May 2021 11:44)    pro-bnp --  @ 03:49     d-dimer 2819   @ 03:49      RADIOLOGY & ADDITIONAL TESTS:    CXR:  < from: Xray Chest 1 View- PORTABLE-Routine (Xray Chest 1 View- PORTABLE-Routine in AM.) (21 @ 10:58) >  INTERPRETATION:  AP supine chest on May 7, 2021 at 8:53 AM. Patient is short of breath.    Heart magnified by technique but likely enlarged.    Tracheostomy, left jugular line, and 2 catheter left chest tubes remain.    Significant mid and upper lung field infiltrates are unchanged. No pneumothorax. Retrocardiac infiltrate unchanged.    Chest is similar to May 6 area    IMPRESSION: Unchanged chest as above.    < end of copied text >    Ct scan chest:    ekg;    echo:   Patient is a 55y old  Male who presents with a chief complaint of SOB (08 May 2021 09:09)    Patient remains on ventilator via trach.  Sedated. Not acute distress, clinically unchanged.  Less tachypneic.  Awake, alert somewhat    INTERVAL HPI/OVERNIGHT EVENTS:      VITAL SIGNS:  T(F): 98.2 (21 @ 08:00)  HR: 79 (21 @ 08:33)  BP: 87/53 (21 @ 08:00)  RR: 20 (21 @ 08:00)  SpO2: 100% (21 @ 08:33)  Wt(kg): --  I&O's Detail    07 May 2021 07:  -  08 May 2021 07:00  --------------------------------------------------------  IN:    Dexmedetomidine: 632 mL    Enteral Tube Flush: 190 mL    Free Water: 750 mL    Osmolite 1.2: 810 mL    Sodium Chloride 0.9% Bolus: 1000 mL  Total IN: 3382 mL    OUT:    Chest Tube (mL): 95 mL    Chest Tube (mL): 5 mL    Indwelling Catheter - Urethral (mL): 3206 mL  Total OUT: 3306 mL    Total NET: 76 mL      08 May 2021 07:01  -  08 May 2021 10:00  --------------------------------------------------------  IN:    Dexmedetomidine: 31.5 mL  Total IN: 31.5 mL    OUT:    Indwelling Catheter - Urethral (mL): 450 mL  Total OUT: 450 mL    Total NET: -418.5 mL        Mode: AC/ CMV (Assist Control/ Continuous Mandatory Ventilation)  RR (machine): 20  TV (machine): 400  FiO2: 60  PEEP: 5  ITime: 1  MAP: 11  PIP: 33        REVIEW OF SYSTEMS:    CONSTITUTIONAL:  No fevers, chills, sweats    HEENT:  Eyes:  No diplopia or blurred vision. ENT:  No earache, sore throat or runny nose.    CARDIOVASCULAR:  No pressure, squeezing, tightness, or heaviness about the chest; no palpitations.    RESPIRATORY:  Per HPI    GASTROINTESTINAL:  No abdominal pain, nausea, vomiting or diarrhea.    GENITOURINARY:  No dysuria, frequency or urgency.    NEUROLOGIC:  No paresthesias, fasciculations, seizures or weakness.    PSYCHIATRIC:  No disorder of thought or mood.      PHYSICAL EXAM:    Constitutional: Well developed and nourished  Eyes:Perrla  ENMT: normal  Neck:supple  Respiratory: Ronchi bilaterally  Cardiovascular: S1 S2 regular  Gastrointestinal: Soft, Non tender  Extremities: No edema  Vascular:normal  Neurological: sedated   Musculoskeletal:Normal      MEDICATIONS  (STANDING):  ascorbic acid 1000 milliGRAM(s) Oral daily  BACItracin   Ointment 1 Application(s) Topical two times a day  chlorhexidine 0.12% Liquid 15 milliLiter(s) Oral Mucosa every 12 hours  chlorhexidine 2% Cloths 1 Application(s) Topical <User Schedule>  clonazePAM  Tablet 2 milliGRAM(s) Oral every 8 hours  dexMEDEtomidine Infusion 0.2 MICROgram(s)/kG/Hr (4.2 mL/Hr) IV Continuous <Continuous>  enoxaparin Injectable 40 milliGRAM(s) SubCutaneous every 24 hours  furosemide   Injectable 40 milliGRAM(s) IV Push every 12 hours  insulin lispro (ADMELOG) corrective regimen sliding scale   SubCutaneous every 6 hours  methadone    Tablet 10 milliGRAM(s) Oral every 8 hours  midodrine 10 milliGRAM(s) Oral every 8 hours  OLANZapine 10 milliGRAM(s) Oral every 12 hours  pantoprazole  Injectable 40 milliGRAM(s) IV Push every 12 hours  polyethylene glycol 3350 17 Gram(s) Oral two times a day  potassium chloride   Powder 40 milliEquivalent(s) Oral every 4 hours  predniSONE   Tablet 40 milliGRAM(s) Oral daily  tamsulosin 0.4 milliGRAM(s) Oral at bedtime  trimethoprim  160 mG/sulfamethoxazole 800 mG 1 Tablet(s) Oral <User Schedule>    MEDICATIONS  (PRN):  acetaminophen    Suspension .. 650 milliGRAM(s) Oral every 12 hours PRN Temp greater or equal to 38C (100.4F)  ALBUTerol    90 MICROgram(s) HFA Inhaler 2 Puff(s) Inhalation every 6 hours PRN Shortness of Breath and/or Wheezing  artificial  tears Solution 1 Drop(s) Both EYES every 4 hours PRN Dry Eyes  LORazepam   Injectable 2 milliGRAM(s) IV Push every 6 hours PRN Agitation  sodium chloride 0.9% lock flush 10 milliLiter(s) IV Push every 1 hour PRN Pre/post blood products, medications, blood draw, and to maintain line patency  sodium chloride 0.9% lock flush 10 milliLiter(s) IV Push every 1 hour PRN Pre/post blood products, medications, blood draw, and to maintain line patency      Allergies    No Known Allergies    Intolerances        LABS:                        8.2    10.49 )-----------( 352      ( 08 May 2021 04:25 )             27.1     05-08    139  |  97  |  11  ----------------------------<  152<H>  3.4<L>   |  41<H>  |  0.35<L>    Ca    8.3<L>      08 May 2021 04:25  Phos  2.6     05-08  Mg     1.6     -08    TPro  6.4  /  Alb  2.4<L>  /  TBili  0.8  /  DBili  x   /  AST  12  /  ALT  18  /  AlkPhos  85  05-08      Urinalysis Basic - ( 07 May 2021 09:07 )    Color: Yellow / Appearance: Clear / S.020 / pH: x  Gluc: x / Ketone: Moderate  / Bili: Small / Urobili: 12   Blood: x / Protein: 30 mg/dL / Nitrite: Positive   Leuk Esterase: Trace / RBC: 10-25 /HPF / WBC 3-5 /HPF   Sq Epi: x / Non Sq Epi: x / Bacteria: Moderate /HPF      ABG - ( 08 May 2021 04:32 )  pH, Arterial: 7.41  pH, Blood: x     /  pCO2: 73    /  pO2: 69    / HCO3: 46    / Base Excess: 17.6  /  SaO2: 94                    CAPILLARY BLOOD GLUCOSE      POCT Blood Glucose.: 155 mg/dL (08 May 2021 06:21)  POCT Blood Glucose.: 153 mg/dL (07 May 2021 23:29)  POCT Blood Glucose.: 173 mg/dL (07 May 2021 16:02)  POCT Blood Glucose.: 161 mg/dL (07 May 2021 11:44)    pro-bnp --  @ 03:49     d-dimer 2819   @ 03:49      RADIOLOGY & ADDITIONAL TESTS:    CXR:  < from: Xray Chest 1 View- PORTABLE-Routine (Xray Chest 1 View- PORTABLE-Routine in AM.) (21 @ 10:58) >  INTERPRETATION:  AP supine chest on May 7, 2021 at 8:53 AM. Patient is short of breath.    Heart magnified by technique but likely enlarged.    Tracheostomy, left jugular line, and 2 catheter left chest tubes remain.    Significant mid and upper lung field infiltrates are unchanged. No pneumothorax. Retrocardiac infiltrate unchanged.    Chest is similar to May 6 area    IMPRESSION: Unchanged chest as above.    < end of copied text >    Ct scan chest:    ekg;    echo:

## 2021-05-08 NOTE — PROGRESS NOTE ADULT - PROBLEM SELECTOR PLAN 3
Ct chest  noted  Thoracic sx follow up  S/P  anterior pig tail tube by IR on left  Daily  CXR   Management per ICU.

## 2021-05-08 NOTE — PROGRESS NOTE ADULT - SUBJECTIVE AND OBJECTIVE BOX
INTERVAL HPI/OVERNIGHT EVENTS: *** Patient was seen and examined at bed side.     PRESSORS: [ ] YES [ ] NO  WHICH:    ANTIBIOTICS:                  DATE STARTED:  ANTIBIOTICS:                  DATE STARTED:  ANTIBIOTICS:                  DATE STARTED:    Antimicrobial:  trimethoprim  160 mG/sulfamethoxazole 800 mG 1 Tablet(s) Oral <User Schedule>    Cardiovascular:  furosemide   Injectable 40 milliGRAM(s) IV Push every 12 hours  midodrine 5 milliGRAM(s) Oral every 8 hours  tamsulosin 0.4 milliGRAM(s) Oral at bedtime    Pulmonary:  ALBUTerol    90 MICROgram(s) HFA Inhaler 2 Puff(s) Inhalation every 6 hours PRN    Hematalogic:  enoxaparin Injectable 40 milliGRAM(s) SubCutaneous every 24 hours    Other:  acetaminophen    Suspension .. 650 milliGRAM(s) Oral every 12 hours PRN  artificial  tears Solution 1 Drop(s) Both EYES every 4 hours PRN  ascorbic acid 1000 milliGRAM(s) Oral daily  BACItracin   Ointment 1 Application(s) Topical two times a day  chlorhexidine 0.12% Liquid 15 milliLiter(s) Oral Mucosa every 12 hours  chlorhexidine 2% Cloths 1 Application(s) Topical <User Schedule>  clonazePAM  Tablet 1 milliGRAM(s) Oral every 8 hours  dexMEDEtomidine Infusion 0.2 MICROgram(s)/kG/Hr IV Continuous <Continuous>  insulin lispro (ADMELOG) corrective regimen sliding scale   SubCutaneous every 6 hours  LORazepam   Injectable 2 milliGRAM(s) IV Push every 6 hours PRN  methadone    Tablet 10 milliGRAM(s) Oral every 8 hours  OLANZapine 5 milliGRAM(s) Oral two times a day  pantoprazole  Injectable 40 milliGRAM(s) IV Push every 12 hours  polyethylene glycol 3350 17 Gram(s) Oral two times a day  predniSONE   Tablet 40 milliGRAM(s) Oral daily  sodium chloride 0.9% lock flush 10 milliLiter(s) IV Push every 1 hour PRN  sodium chloride 0.9% lock flush 10 milliLiter(s) IV Push every 1 hour PRN    acetaminophen    Suspension .. 650 milliGRAM(s) Oral every 12 hours PRN  ALBUTerol    90 MICROgram(s) HFA Inhaler 2 Puff(s) Inhalation every 6 hours PRN  artificial  tears Solution 1 Drop(s) Both EYES every 4 hours PRN  ascorbic acid 1000 milliGRAM(s) Oral daily  BACItracin   Ointment 1 Application(s) Topical two times a day  chlorhexidine 0.12% Liquid 15 milliLiter(s) Oral Mucosa every 12 hours  chlorhexidine 2% Cloths 1 Application(s) Topical <User Schedule>  clonazePAM  Tablet 1 milliGRAM(s) Oral every 8 hours  dexMEDEtomidine Infusion 0.2 MICROgram(s)/kG/Hr IV Continuous <Continuous>  enoxaparin Injectable 40 milliGRAM(s) SubCutaneous every 24 hours  furosemide   Injectable 40 milliGRAM(s) IV Push every 12 hours  insulin lispro (ADMELOG) corrective regimen sliding scale   SubCutaneous every 6 hours  LORazepam   Injectable 2 milliGRAM(s) IV Push every 6 hours PRN  methadone    Tablet 10 milliGRAM(s) Oral every 8 hours  midodrine 5 milliGRAM(s) Oral every 8 hours  OLANZapine 5 milliGRAM(s) Oral two times a day  pantoprazole  Injectable 40 milliGRAM(s) IV Push every 12 hours  polyethylene glycol 3350 17 Gram(s) Oral two times a day  predniSONE   Tablet 40 milliGRAM(s) Oral daily  sodium chloride 0.9% lock flush 10 milliLiter(s) IV Push every 1 hour PRN  sodium chloride 0.9% lock flush 10 milliLiter(s) IV Push every 1 hour PRN  tamsulosin 0.4 milliGRAM(s) Oral at bedtime  trimethoprim  160 mG/sulfamethoxazole 800 mG 1 Tablet(s) Oral <User Schedule>    Drug Dosing Weight  Height (cm): 167.6 (2021 23:15)  Weight (kg): 83.9 (2021 23:15)  BMI (kg/m2): 29.9 (2021 23:15)  BSA (m2): 1.93 (2021 23:15)    CENTRAL LINE: [ ] YES [ ] NO  LOCATION:   DATE INSERTED:  REMOVE: [ ] YES [ ] NO  EXPLAIN:    RIVERA: [ ] YES [ ] NO    DATE INSERTED:  REMOVE:  [ ] YES [ ] NO  EXPLAIN:    A-LINE:  [ ] YES [ ] NO  LOCATION:   DATE INSERTED:  REMOVE:  [ ] YES [ ] NO  EXPLAIN:        ICU Vital Signs Last 24 Hrs  T(C): 37.6 (07 May 2021 20:00), Max: 37.6 (07 May 2021 12:00)  T(F): 99.7 (07 May 2021 20:00), Max: 99.7 (07 May 2021 12:00)  HR: 77 (07 May 2021 23:49) (72 - 135)  BP: 97/49 (07 May 2021 23:49) (86/43 - 156/73)  BP(mean): 60 (07 May 2021 23:49) (53 - 93)  ABP: --  ABP(mean): --  RR: 20 (07 May 2021 23:49) (16 - 46)  SpO2: 100% (07 May 2021 23:49) (88% - 100%)      ABG - ( 07 May 2021 08:28 )  pH, Arterial: 7.42  pH, Blood: x     /  pCO2: 65    /  pO2: 98    / HCO3: 42    / Base Excess: 14.5  /  SaO2: 99                    05-06 @ 07:01  -  05-07 @ 07:00  --------------------------------------------------------  IN: 1434 mL / OUT: 2625 mL / NET: -1191 mL        Mode: AC/ CMV (Assist Control/ Continuous Mandatory Ventilation)  RR (machine): 20  TV (machine): 400  FiO2: 60  PEEP: 5  ITime: 1  MAP: 11  PIP: 32      PHYSICAL EXAM:    GENERAL:NAD, well-groomed, well-developed  HEAD:  Atraumatic, Normocephalic  EYES: EOMI, PERRLA, conjunctiva and sclera clear  ENMT: No tonsillar erythema, exudates, or enlargement; Moist mucous membranes, Good dentition, No lesions  NECK: Supple, normal appearance, No JVD; Normal thyroid; Trachea midline  NERVOUS SYSTEM: Alert & Oriented X3, Good concentration; Motor Strength 5/5 B/L upper and lower extremities; DTRs 2+ intact and symmetric  CHEST/LUNG: No chest deformity;Normal percussion bilaterally; No rales, rhonchi, wheezing   HEART: Regular rate and rhythm; No murmurs, rubs, or gallops  ABDOMEN: Soft, Nontender, Nondistended; Bowel sounds present  EXTREMITIES: 2+ Peripheral Pulses, No clubbing, cyanosis, or edema  LYMPH: No lymphadenopathy noted  SKIN:No rashes or lesions; Good capillary refill      LABS:  CBC Full  -  ( 07 May 2021 15:12 )  WBC Count : 9.42 K/uL  RBC Count : 2.21 M/uL  Hemoglobin : 8.2 g/dL  Hematocrit : 24.0 %  Platelet Count - Automated : 316 K/uL  Mean Cell Volume : 108.6 fl  Mean Cell Hemoglobin : 37.1 pg  Mean Cell Hemoglobin Concentration : 34.2 gm/dL  Auto Neutrophil # : x  Auto Lymphocyte # : x  Auto Monocyte # : x  Auto Eosinophil # : x  Auto Basophil # : x  Auto Neutrophil % : x  Auto Lymphocyte % : x  Auto Monocyte % : x  Auto Eosinophil % : x  Auto Basophil % : x        141  |  100  |  12  ----------------------------<  108<H>  3.3<L>   |  42<H>  |  <0.20<L>    Ca    8.0<L>      07 May 2021 05:09  Phos  3.0     -  Mg     1.8         TPro  5.4<L>  /  Alb  1.9<L>  /  TBili  0.6  /  DBili  x   /  AST  13  /  ALT  17  /  AlkPhos  69  05-07    PT/INR - ( 06 May 2021 03:45 )   PT: 15.3 sec;   INR: 1.30 ratio         PTT - ( 06 May 2021 03:45 )  PTT:32.1 sec  Urinalysis Basic - ( 07 May 2021 09:07 )    Color: Yellow / Appearance: Clear / S.020 / pH: x  Gluc: x / Ketone: Moderate  / Bili: Small / Urobili: 12   Blood: x / Protein: 30 mg/dL / Nitrite: Positive   Leuk Esterase: Trace / RBC: 10-25 /HPF / WBC 3-5 /HPF   Sq Epi: x / Non Sq Epi: x / Bacteria: Moderate /HPF          RADIOLOGY & ADDITIONAL STUDIES REVIEWED:  ***    GOALS OF CARE DISCUSSION WITH PATIENT/FAMILY/PROXY: full code/DNR/DNI    CRITICAL CARE TIME SPENT: 35 minutes INTERVAL HPI/OVERNIGHT EVENTS: Patient was seen and examined at bed side. Patient noted to be hypotensive over night. Given 500CC NS bolus. No other acute events overnight.     PRESSORS: No    ANTIBIOTICS: bactrim                    Antimicrobial:  trimethoprim  160 mG/sulfamethoxazole 800 mG 1 Tablet(s) Oral <User Schedule>    Cardiovascular:  furosemide   Injectable 40 milliGRAM(s) IV Push every 12 hours  midodrine 5 milliGRAM(s) Oral every 8 hours  tamsulosin 0.4 milliGRAM(s) Oral at bedtime    Pulmonary:  ALBUTerol    90 MICROgram(s) HFA Inhaler 2 Puff(s) Inhalation every 6 hours PRN    Hematalogic:  enoxaparin Injectable 40 milliGRAM(s) SubCutaneous every 24 hours    Other:  acetaminophen    Suspension .. 650 milliGRAM(s) Oral every 12 hours PRN  artificial  tears Solution 1 Drop(s) Both EYES every 4 hours PRN  ascorbic acid 1000 milliGRAM(s) Oral daily  BACItracin   Ointment 1 Application(s) Topical two times a day  chlorhexidine 0.12% Liquid 15 milliLiter(s) Oral Mucosa every 12 hours  chlorhexidine 2% Cloths 1 Application(s) Topical <User Schedule>  clonazePAM  Tablet 1 milliGRAM(s) Oral every 8 hours  dexMEDEtomidine Infusion 0.2 MICROgram(s)/kG/Hr IV Continuous <Continuous>  insulin lispro (ADMELOG) corrective regimen sliding scale   SubCutaneous every 6 hours  LORazepam   Injectable 2 milliGRAM(s) IV Push every 6 hours PRN  methadone    Tablet 10 milliGRAM(s) Oral every 8 hours  OLANZapine 5 milliGRAM(s) Oral two times a day  pantoprazole  Injectable 40 milliGRAM(s) IV Push every 12 hours  polyethylene glycol 3350 17 Gram(s) Oral two times a day  predniSONE   Tablet 40 milliGRAM(s) Oral daily  sodium chloride 0.9% lock flush 10 milliLiter(s) IV Push every 1 hour PRN  sodium chloride 0.9% lock flush 10 milliLiter(s) IV Push every 1 hour PRN    acetaminophen    Suspension .. 650 milliGRAM(s) Oral every 12 hours PRN  ALBUTerol    90 MICROgram(s) HFA Inhaler 2 Puff(s) Inhalation every 6 hours PRN  artificial  tears Solution 1 Drop(s) Both EYES every 4 hours PRN  ascorbic acid 1000 milliGRAM(s) Oral daily  BACItracin   Ointment 1 Application(s) Topical two times a day  chlorhexidine 0.12% Liquid 15 milliLiter(s) Oral Mucosa every 12 hours  chlorhexidine 2% Cloths 1 Application(s) Topical <User Schedule>  clonazePAM  Tablet 1 milliGRAM(s) Oral every 8 hours  dexMEDEtomidine Infusion 0.2 MICROgram(s)/kG/Hr IV Continuous <Continuous>  enoxaparin Injectable 40 milliGRAM(s) SubCutaneous every 24 hours  furosemide   Injectable 40 milliGRAM(s) IV Push every 12 hours  insulin lispro (ADMELOG) corrective regimen sliding scale   SubCutaneous every 6 hours  LORazepam   Injectable 2 milliGRAM(s) IV Push every 6 hours PRN  methadone    Tablet 10 milliGRAM(s) Oral every 8 hours  midodrine 5 milliGRAM(s) Oral every 8 hours  OLANZapine 5 milliGRAM(s) Oral two times a day  pantoprazole  Injectable 40 milliGRAM(s) IV Push every 12 hours  polyethylene glycol 3350 17 Gram(s) Oral two times a day  predniSONE   Tablet 40 milliGRAM(s) Oral daily  sodium chloride 0.9% lock flush 10 milliLiter(s) IV Push every 1 hour PRN  sodium chloride 0.9% lock flush 10 milliLiter(s) IV Push every 1 hour PRN  tamsulosin 0.4 milliGRAM(s) Oral at bedtime  trimethoprim  160 mG/sulfamethoxazole 800 mG 1 Tablet(s) Oral <User Schedule>    Drug Dosing Weight  Height (cm): 167.6 (2021 23:15)  Weight (kg): 83.9 (2021 23:15)  BMI (kg/m2): 29.9 (2021 23:15)  BSA (m2): 1.93 (2021 23:15)    CENTRAL LINE: Yes  LOCATION: Left IJ  DATE INSERTED:   REMOVE: No    RIVERA: Yes     DATE INSERTED:   REMOVE: No    A-LINE: Yes     ICU Vital Signs Last 24 Hrs  T(C): 37.6 (07 May 2021 20:00), Max: 37.6 (07 May 2021 12:00)  T(F): 99.7 (07 May 2021 20:00), Max: 99.7 (07 May 2021 12:00)  HR: 77 (07 May 2021 23:49) (72 - 135)  BP: 97/49 (07 May 2021 23:49) (86/43 - 156/73)  BP(mean): 60 (07 May 2021 23:49) (53 - 93)  RR: 20 (07 May 2021 23:49) (16 - 46)  SpO2: 100% (07 May 2021 23:49) (88% - 100%)      ABG - ( 07 May 2021 08:28 )  pH, Arterial: 7.42  pH, Blood: x     /  pCO2: 65    /  pO2: 98    / HCO3: 42    / Base Excess: 14.5  /  SaO2: 99          05- @ 07:01  -   @ 07:00  --------------------------------------------------------  IN: 1434 mL / OUT: 2625 mL / NET: -1191 mL        Mode: AC/ CMV (Assist Control/ Continuous Mandatory Ventilation)  RR (machine): 20  TV (machine): 400  FiO2: 60  PEEP: 5  ITime: 1  MAP: 11  PIP: 32      PHYSICAL EXAM:  GENERAL: Patient seen resting in bed and in no apparent distress   HEAD: Atraumatic, Normocephalic  EYES: PERRLA, conjunctiva and sclera clear  ENMT: Moist mucous membranes, Good dentition, No lesions  NECK: Supple, normal appearance   NERVOUS SYSTEM: Alert & Oriented, Good concentration   CHEST/LUNG: No chest deformity; crackles present to ausculation; chest tubes noted in place on left side   HEART: Regular rate and rhythm; No murmurs   ABDOMEN: Soft, Nontender, Nondistended; Bowel sounds present; PEG tube in place  EXTREMITIES: No clubbing, cyanosis, or edema  LYMPH: No lymphadenopathy noted  SKIN: No rashes or lesions       LABS:  CBC Full  -  ( 07 May 2021 15:12 )  WBC Count : 9.42 K/uL  RBC Count : 2.21 M/uL  Hemoglobin : 8.2 g/dL  Hematocrit : 24.0 %  Platelet Count - Automated : 316 K/uL  Mean Cell Volume : 108.6 fl  Mean Cell Hemoglobin : 37.1 pg  Mean Cell Hemoglobin Concentration : 34.2 gm/dL  Auto Neutrophil # : x  Auto Lymphocyte # : x  Auto Monocyte # : x  Auto Eosinophil # : x  Auto Basophil # : x  Auto Neutrophil % : x  Auto Lymphocyte % : x  Auto Monocyte % : x  Auto Eosinophil % : x  Auto Basophil % : x        141  |  100  |  12  ----------------------------<  108<H>  3.3<L>   |  42<H>  |  <0.20<L>    Ca    8.0<L>      07 May 2021 05:09  Phos  3.0       Mg     1.8         TPro  5.4<L>  /  Alb  1.9<L>  /  TBili  0.6  /  DBili  x   /  AST  13  /  ALT  17  /  AlkPhos  69      PT/INR - ( 06 May 2021 03:45 )   PT: 15.3 sec;   INR: 1.30 ratio         PTT - ( 06 May 2021 03:45 )  PTT:32.1 sec  Urinalysis Basic - ( 07 May 2021 09:07 )    Color: Yellow / Appearance: Clear / S.020 / pH: x  Gluc: x / Ketone: Moderate  / Bili: Small / Urobili: 12   Blood: x / Protein: 30 mg/dL / Nitrite: Positive   Leuk Esterase: Trace / RBC: 10-25 /HPF / WBC 3-5 /HPF   Sq Epi: x / Non Sq Epi: x / Bacteria: Moderate /HPF          RADIOLOGY & ADDITIONAL STUDIES REVIEWED:  ***    GOALS OF CARE DISCUSSION WITH PATIENT/FAMILY/PROXY: full code/DNR/DNI    CRITICAL CARE TIME SPENT: 35 minutes INTERVAL HPI/OVERNIGHT EVENTS: Patient was seen and examined at bed side. Patient noted to be hypotensive over night. Given 500CC NS bolus. No other acute events overnight.     PRESSORS: No    ANTIBIOTICS: bactrim                    Antimicrobial:  trimethoprim  160 mG/sulfamethoxazole 800 mG 1 Tablet(s) Oral <User Schedule>    Cardiovascular:  furosemide   Injectable 40 milliGRAM(s) IV Push every 12 hours  midodrine 5 milliGRAM(s) Oral every 8 hours  tamsulosin 0.4 milliGRAM(s) Oral at bedtime    Pulmonary:  ALBUTerol    90 MICROgram(s) HFA Inhaler 2 Puff(s) Inhalation every 6 hours PRN    Hematalogic:  enoxaparin Injectable 40 milliGRAM(s) SubCutaneous every 24 hours    Other:  acetaminophen    Suspension .. 650 milliGRAM(s) Oral every 12 hours PRN  artificial  tears Solution 1 Drop(s) Both EYES every 4 hours PRN  ascorbic acid 1000 milliGRAM(s) Oral daily  BACItracin   Ointment 1 Application(s) Topical two times a day  chlorhexidine 0.12% Liquid 15 milliLiter(s) Oral Mucosa every 12 hours  chlorhexidine 2% Cloths 1 Application(s) Topical <User Schedule>  clonazePAM  Tablet 1 milliGRAM(s) Oral every 8 hours  dexMEDEtomidine Infusion 0.2 MICROgram(s)/kG/Hr IV Continuous <Continuous>  insulin lispro (ADMELOG) corrective regimen sliding scale   SubCutaneous every 6 hours  LORazepam   Injectable 2 milliGRAM(s) IV Push every 6 hours PRN  methadone    Tablet 10 milliGRAM(s) Oral every 8 hours  OLANZapine 5 milliGRAM(s) Oral two times a day  pantoprazole  Injectable 40 milliGRAM(s) IV Push every 12 hours  polyethylene glycol 3350 17 Gram(s) Oral two times a day  predniSONE   Tablet 40 milliGRAM(s) Oral daily  sodium chloride 0.9% lock flush 10 milliLiter(s) IV Push every 1 hour PRN  sodium chloride 0.9% lock flush 10 milliLiter(s) IV Push every 1 hour PRN    acetaminophen    Suspension .. 650 milliGRAM(s) Oral every 12 hours PRN  ALBUTerol    90 MICROgram(s) HFA Inhaler 2 Puff(s) Inhalation every 6 hours PRN  artificial  tears Solution 1 Drop(s) Both EYES every 4 hours PRN  ascorbic acid 1000 milliGRAM(s) Oral daily  BACItracin   Ointment 1 Application(s) Topical two times a day  chlorhexidine 0.12% Liquid 15 milliLiter(s) Oral Mucosa every 12 hours  chlorhexidine 2% Cloths 1 Application(s) Topical <User Schedule>  clonazePAM  Tablet 1 milliGRAM(s) Oral every 8 hours  dexMEDEtomidine Infusion 0.2 MICROgram(s)/kG/Hr IV Continuous <Continuous>  enoxaparin Injectable 40 milliGRAM(s) SubCutaneous every 24 hours  furosemide   Injectable 40 milliGRAM(s) IV Push every 12 hours  insulin lispro (ADMELOG) corrective regimen sliding scale   SubCutaneous every 6 hours  LORazepam   Injectable 2 milliGRAM(s) IV Push every 6 hours PRN  methadone    Tablet 10 milliGRAM(s) Oral every 8 hours  midodrine 5 milliGRAM(s) Oral every 8 hours  OLANZapine 5 milliGRAM(s) Oral two times a day  pantoprazole  Injectable 40 milliGRAM(s) IV Push every 12 hours  polyethylene glycol 3350 17 Gram(s) Oral two times a day  predniSONE   Tablet 40 milliGRAM(s) Oral daily  sodium chloride 0.9% lock flush 10 milliLiter(s) IV Push every 1 hour PRN  sodium chloride 0.9% lock flush 10 milliLiter(s) IV Push every 1 hour PRN  tamsulosin 0.4 milliGRAM(s) Oral at bedtime  trimethoprim  160 mG/sulfamethoxazole 800 mG 1 Tablet(s) Oral <User Schedule>    Drug Dosing Weight  Height (cm): 167.6 (2021 23:15)  Weight (kg): 83.9 (2021 23:15)  BMI (kg/m2): 29.9 (2021 23:15)  BSA (m2): 1.93 (2021 23:15)    CENTRAL LINE: Yes  LOCATION: Left IJ  DATE INSERTED:   REMOVE: No    RIVERA: Yes     DATE INSERTED:   REMOVE: No    A-LINE: Yes     ICU Vital Signs Last 24 Hrs  T(C): 37.6 (07 May 2021 20:00), Max: 37.6 (07 May 2021 12:00)  T(F): 99.7 (07 May 2021 20:00), Max: 99.7 (07 May 2021 12:00)  HR: 77 (07 May 2021 23:49) (72 - 135)  BP: 97/49 (07 May 2021 23:49) (86/43 - 156/73)  BP(mean): 60 (07 May 2021 23:49) (53 - 93)  RR: 20 (07 May 2021 23:49) (16 - 46)  SpO2: 100% (07 May 2021 23:49) (88% - 100%)      ABG - ( 07 May 2021 08:28 )  pH, Arterial: 7.42  pH, Blood: x     /  pCO2: 65    /  pO2: 98    / HCO3: 42    / Base Excess: 14.5  /  SaO2: 99          05 @ 07:01  -   @ 07:00  --------------------------------------------------------  IN: 1434 mL / OUT: 2625 mL / NET: -1191 mL        Mode: AC/ CMV (Assist Control/ Continuous Mandatory Ventilation)  RR (machine): 20  TV (machine): 400  FiO2: 60  PEEP: 5  ITime: 1  MAP: 11  PIP: 32      PHYSICAL EXAM:  GENERAL: Patient seen resting in bed and in no apparent distress   HEAD: Atraumatic, Normocephalic  EYES: PERRLA, conjunctiva and sclera clear  ENMT: Moist mucous membranes, Good dentition, No lesions  NECK: Supple, normal appearance   NERVOUS SYSTEM: Alert & Oriented, Good concentration   CHEST/LUNG: No chest deformity; crackles present to ausculation; chest tubes noted in place on left side   HEART: Regular rate and rhythm; No murmurs   ABDOMEN: Soft, Nontender, Nondistended; Bowel sounds present; PEG tube in place  EXTREMITIES: No clubbing, cyanosis, or edema  LYMPH: No lymphadenopathy noted  SKIN: No rashes or lesions       LABS:  CBC Full  -  ( 07 May 2021 15:12 )  WBC Count : 9.42 K/uL  RBC Count : 2.21 M/uL  Hemoglobin : 8.2 g/dL  Hematocrit : 24.0 %  Platelet Count - Automated : 316 K/uL  Mean Cell Volume : 108.6 fl  Mean Cell Hemoglobin : 37.1 pg  Mean Cell Hemoglobin Concentration : 34.2 gm/dL          141  |  100  |  12  ----------------------------<  108<H>  3.3<L>   |  42<H>  |  <0.20<L>    Ca    8.0<L>      07 May 2021 05:09  Phos  3.0     05  Mg     1.8         TPro  5.4<L>  /  Alb  1.9<L>  /  TBili  0.6  /  DBili  x   /  AST  13  /  ALT  17  /  AlkPhos  69      PT/INR - ( 06 May 2021 03:45 )   PT: 15.3 sec;   INR: 1.30 ratio         PTT - ( 06 May 2021 03:45 )  PTT:32.1 sec  Urinalysis Basic - ( 07 May 2021 09:07 )    Color: Yellow / Appearance: Clear / S.020 / pH: x  Gluc: x / Ketone: Moderate  / Bili: Small / Urobili: 12   Blood: x / Protein: 30 mg/dL / Nitrite: Positive   Leuk Esterase: Trace / RBC: 10-25 /HPF / WBC 3-5 /HPF   Sq Epi: x / Non Sq Epi: x / Bacteria: Moderate /HPF    RADIOLOGY & ADDITIONAL STUDIES REVIEWED:  < from: Xray Chest 1 View- PORTABLE-Routine (Xray Chest 1 View- PORTABLE-Routine in AM.) (21 @ 10:58) >  IMPRESSION: Unchanged chest as above.    < end of copied text >    GOALS OF CARE DISCUSSION WITH PATIENT/FAMILY/PROXY: full code  CRITICAL CARE TIME SPENT: 35 minutes INTERVAL HPI/OVERNIGHT EVENTS: Patient was seen and examined at bed side. Patient noted to be hypotensive over night. Given 500CC NS bolus. No other acute events overnight. Increased midodrine to 10mg tid. Will start on pheny if hypotensive, hold any more fluids.    PRESSORS: No    ANTIBIOTICS: bactrim                    Antimicrobial:  trimethoprim  160 mG/sulfamethoxazole 800 mG 1 Tablet(s) Oral <User Schedule>    Cardiovascular:  furosemide   Injectable 40 milliGRAM(s) IV Push every 12 hours  midodrine 5 milliGRAM(s) Oral every 8 hours  tamsulosin 0.4 milliGRAM(s) Oral at bedtime    Pulmonary:  ALBUTerol    90 MICROgram(s) HFA Inhaler 2 Puff(s) Inhalation every 6 hours PRN    Hematalogic:  enoxaparin Injectable 40 milliGRAM(s) SubCutaneous every 24 hours    Other:  acetaminophen    Suspension .. 650 milliGRAM(s) Oral every 12 hours PRN  artificial  tears Solution 1 Drop(s) Both EYES every 4 hours PRN  ascorbic acid 1000 milliGRAM(s) Oral daily  BACItracin   Ointment 1 Application(s) Topical two times a day  chlorhexidine 0.12% Liquid 15 milliLiter(s) Oral Mucosa every 12 hours  chlorhexidine 2% Cloths 1 Application(s) Topical <User Schedule>  clonazePAM  Tablet 1 milliGRAM(s) Oral every 8 hours  dexMEDEtomidine Infusion 0.2 MICROgram(s)/kG/Hr IV Continuous <Continuous>  insulin lispro (ADMELOG) corrective regimen sliding scale   SubCutaneous every 6 hours  LORazepam   Injectable 2 milliGRAM(s) IV Push every 6 hours PRN  methadone    Tablet 10 milliGRAM(s) Oral every 8 hours  OLANZapine 5 milliGRAM(s) Oral two times a day  pantoprazole  Injectable 40 milliGRAM(s) IV Push every 12 hours  polyethylene glycol 3350 17 Gram(s) Oral two times a day  predniSONE   Tablet 40 milliGRAM(s) Oral daily  sodium chloride 0.9% lock flush 10 milliLiter(s) IV Push every 1 hour PRN  sodium chloride 0.9% lock flush 10 milliLiter(s) IV Push every 1 hour PRN    acetaminophen    Suspension .. 650 milliGRAM(s) Oral every 12 hours PRN  ALBUTerol    90 MICROgram(s) HFA Inhaler 2 Puff(s) Inhalation every 6 hours PRN  artificial  tears Solution 1 Drop(s) Both EYES every 4 hours PRN  ascorbic acid 1000 milliGRAM(s) Oral daily  BACItracin   Ointment 1 Application(s) Topical two times a day  chlorhexidine 0.12% Liquid 15 milliLiter(s) Oral Mucosa every 12 hours  chlorhexidine 2% Cloths 1 Application(s) Topical <User Schedule>  clonazePAM  Tablet 1 milliGRAM(s) Oral every 8 hours  dexMEDEtomidine Infusion 0.2 MICROgram(s)/kG/Hr IV Continuous <Continuous>  enoxaparin Injectable 40 milliGRAM(s) SubCutaneous every 24 hours  furosemide   Injectable 40 milliGRAM(s) IV Push every 12 hours  insulin lispro (ADMELOG) corrective regimen sliding scale   SubCutaneous every 6 hours  LORazepam   Injectable 2 milliGRAM(s) IV Push every 6 hours PRN  methadone    Tablet 10 milliGRAM(s) Oral every 8 hours  midodrine 5 milliGRAM(s) Oral every 8 hours  OLANZapine 5 milliGRAM(s) Oral two times a day  pantoprazole  Injectable 40 milliGRAM(s) IV Push every 12 hours  polyethylene glycol 3350 17 Gram(s) Oral two times a day  predniSONE   Tablet 40 milliGRAM(s) Oral daily  sodium chloride 0.9% lock flush 10 milliLiter(s) IV Push every 1 hour PRN  sodium chloride 0.9% lock flush 10 milliLiter(s) IV Push every 1 hour PRN  tamsulosin 0.4 milliGRAM(s) Oral at bedtime  trimethoprim  160 mG/sulfamethoxazole 800 mG 1 Tablet(s) Oral <User Schedule>    Drug Dosing Weight  Height (cm): 167.6 (2021 23:15)  Weight (kg): 83.9 (2021 23:15)  BMI (kg/m2): 29.9 (2021 23:15)  BSA (m2): 1.93 (2021 23:15)    CENTRAL LINE: Yes  LOCATION: Left IJ  DATE INSERTED:   REMOVE: No    RIVERA: Yes     DATE INSERTED:   REMOVE: No    A-LINE: Yes     ICU Vital Signs Last 24 Hrs  T(C): 37.6 (07 May 2021 20:00), Max: 37.6 (07 May 2021 12:00)  T(F): 99.7 (07 May 2021 20:00), Max: 99.7 (07 May 2021 12:00)  HR: 77 (07 May 2021 23:49) (72 - 135)  BP: 97/49 (07 May 2021 23:49) (86/43 - 156/73)  BP(mean): 60 (07 May 2021 23:49) (53 - 93)  RR: 20 (07 May 2021 23:49) (16 - 46)  SpO2: 100% (07 May 2021 23:49) (88% - 100%)      ABG - ( 07 May 2021 08:28 )  pH, Arterial: 7.42  pH, Blood: x     /  pCO2: 65    /  pO2: 98    / HCO3: 42    / Base Excess: 14.5  /  SaO2: 99          05- @ 07:01  -   @ 07:00  --------------------------------------------------------  IN: 1434 mL / OUT: 2625 mL / NET: -1191 mL        Mode: AC/ CMV (Assist Control/ Continuous Mandatory Ventilation)  RR (machine): 20  TV (machine): 400  FiO2: 60  PEEP: 5  ITime: 1  MAP: 11  PIP: 32      PHYSICAL EXAM:  GENERAL: Patient seen resting in bed and in no apparent distress   HEAD: Atraumatic, Normocephalic  EYES: PERRLA, conjunctiva and sclera clear  ENMT: Moist mucous membranes, Good dentition, No lesions  NECK: Supple, normal appearance   NERVOUS SYSTEM: Alert & Oriented, Good concentration   CHEST/LUNG: No chest deformity; crackles present to ausculation; chest tubes noted in place on left side   HEART: Regular rate and rhythm; No murmurs   ABDOMEN: Soft, Nontender, Nondistended; Bowel sounds present; PEG tube in place  EXTREMITIES: No clubbing, cyanosis, or edema  LYMPH: No lymphadenopathy noted  SKIN: No rashes or lesions       LABS:  CBC Full  -  ( 07 May 2021 15:12 )  WBC Count : 9.42 K/uL  RBC Count : 2.21 M/uL  Hemoglobin : 8.2 g/dL  Hematocrit : 24.0 %  Platelet Count - Automated : 316 K/uL  Mean Cell Volume : 108.6 fl  Mean Cell Hemoglobin : 37.1 pg  Mean Cell Hemoglobin Concentration : 34.2 gm/dL      05-07    141  |  100  |  12  ----------------------------<  108<H>  3.3<L>   |  42<H>  |  <0.20<L>    Ca    8.0<L>      07 May 2021 05:09  Phos  3.0       Mg     1.8         TPro  5.4<L>  /  Alb  1.9<L>  /  TBili  0.6  /  DBili  x   /  AST  13  /  ALT  17  /  AlkPhos  69      PT/INR - ( 06 May 2021 03:45 )   PT: 15.3 sec;   INR: 1.30 ratio         PTT - ( 06 May 2021 03:45 )  PTT:32.1 sec  Urinalysis Basic - ( 07 May 2021 09:07 )    Color: Yellow / Appearance: Clear / S.020 / pH: x  Gluc: x / Ketone: Moderate  / Bili: Small / Urobili: 12   Blood: x / Protein: 30 mg/dL / Nitrite: Positive   Leuk Esterase: Trace / RBC: 10-25 /HPF / WBC 3-5 /HPF   Sq Epi: x / Non Sq Epi: x / Bacteria: Moderate /HPF    RADIOLOGY & ADDITIONAL STUDIES REVIEWED:  < from: Xray Chest 1 View- PORTABLE-Routine (Xray Chest 1 View- PORTABLE-Routine in AM.) (21 @ 10:58) >  IMPRESSION: Unchanged chest as above.    < end of copied text >    GOALS OF CARE DISCUSSION WITH PATIENT/FAMILY/PROXY: full code  CRITICAL CARE TIME SPENT: 35 minutes

## 2021-05-08 NOTE — PROGRESS NOTE ADULT - PROBLEM SELECTOR PLAN 1
isolation precautions DC  Cont mechanical ventilation - S.P trach.   Wean as tolerated  Tracheal care  Pulmonary toilet  Decubitus prevention  Supplemental nutrition  ICU management.   Monitor oxygen sat  Monitor LFT, LDH, CRP, D-Dimer, Ferritin and procalcitonin  Vit C, D and zinc supp  Montelukast 10 mgs po Qhs  Daily CXR   G-tube with feeds  Replace lytes  Pulmonary toilet  DVT and GI PPX.

## 2021-05-08 NOTE — PROGRESS NOTE ADULT - SUBJECTIVE AND OBJECTIVE BOX
Patient is a 55y old  Male who presents with a chief complaint of SOB (08 May 2021 00:04)    pt seen in icu [ x ], reg med floor [   ], bed [ x ], chair at bedside [   ], awake and responsive [ x ], mildly sedated, [  ],    nad [x  ]      Allergies    No Known Allergies        Vitals    T(F): 99.5 (05-08-21 @ 04:02), Max: 100.2 (05-08-21 @ 00:16)  HR: 130 (05-08-21 @ 04:16) (72 - 135)  BP: 97/44 (05-08-21 @ 00:00) (87/44 - 144/76)  RR: 20 (05-08-21 @ 00:00) (17 - 43)  SpO2: 91% (05-08-21 @ 04:16) (90% - 100%)  Wt(kg): --  CAPILLARY BLOOD GLUCOSE      POCT Blood Glucose.: 155 mg/dL (08 May 2021 06:21)      Labs                          8.2    10.49 )-----------( 352      ( 08 May 2021 04:25 )             27.1       05-08    139  |  97  |  11  ----------------------------<  152<H>  3.4<L>   |  41<H>  |  0.35<L>    Ca    8.3<L>      08 May 2021 04:25  Phos  2.6     05-08  Mg     1.6     05-08    TPro  6.4  /  Alb  2.4<L>  /  TBili  0.8  /  DBili  x   /  AST  12  /  ALT  18  /  AlkPhos  85  05-08            .Sputum Sputum  04-16 @ 04:42   Moderate Enterobacter aerogenes (Carbapenem Resistant)  Normal Respiratory Monserrat present  --  Enterobacter aerogenes (Carbapenem Resistant)      .Urine Clean Catch (Midstream)  03-15 @ 00:52   No growth  --  --          Radiology Results      Meds    MEDICATIONS  (STANDING):  ascorbic acid 1000 milliGRAM(s) Oral daily  BACItracin   Ointment 1 Application(s) Topical two times a day  chlorhexidine 0.12% Liquid 15 milliLiter(s) Oral Mucosa every 12 hours  chlorhexidine 2% Cloths 1 Application(s) Topical <User Schedule>  clonazePAM  Tablet 1 milliGRAM(s) Oral every 8 hours  dexMEDEtomidine Infusion 0.2 MICROgram(s)/kG/Hr (4.2 mL/Hr) IV Continuous <Continuous>  enoxaparin Injectable 40 milliGRAM(s) SubCutaneous every 24 hours  furosemide   Injectable 40 milliGRAM(s) IV Push every 12 hours  insulin lispro (ADMELOG) corrective regimen sliding scale   SubCutaneous every 6 hours  methadone    Tablet 10 milliGRAM(s) Oral every 8 hours  midodrine 5 milliGRAM(s) Oral every 8 hours  OLANZapine 5 milliGRAM(s) Oral two times a day  pantoprazole  Injectable 40 milliGRAM(s) IV Push every 12 hours  polyethylene glycol 3350 17 Gram(s) Oral two times a day  potassium chloride   Powder 40 milliEquivalent(s) Oral every 4 hours  predniSONE   Tablet 40 milliGRAM(s) Oral daily  tamsulosin 0.4 milliGRAM(s) Oral at bedtime  trimethoprim  160 mG/sulfamethoxazole 800 mG 1 Tablet(s) Oral <User Schedule>      MEDICATIONS  (PRN):  acetaminophen    Suspension .. 650 milliGRAM(s) Oral every 12 hours PRN Temp greater or equal to 38C (100.4F)  ALBUTerol    90 MICROgram(s) HFA Inhaler 2 Puff(s) Inhalation every 6 hours PRN Shortness of Breath and/or Wheezing  artificial  tears Solution 1 Drop(s) Both EYES every 4 hours PRN Dry Eyes  LORazepam   Injectable 2 milliGRAM(s) IV Push every 6 hours PRN Agitation  sodium chloride 0.9% lock flush 10 milliLiter(s) IV Push every 1 hour PRN Pre/post blood products, medications, blood draw, and to maintain line patency  sodium chloride 0.9% lock flush 10 milliLiter(s) IV Push every 1 hour PRN Pre/post blood products, medications, blood draw, and to maintain line patency      Physical Exam    Neuro :  no focal deficits  Respiratory: CTA B/L  CV: RRR, S1S2, no murmurs,   Abdominal: Soft, NT, ND +BS, g- tube in place, dressing cdi  Extremities: b/l ue edema, + peripheral pulses      ASSESSMENT    Hypoxemia 2nd to covid pna   transaminitis  prediabetes  h/o appendectomy  cholecystectomy        PLAN    contact and airborne isolation  d/c remdesevir given covid ab positive noted   completed dexamethasone   started pulse steroids for 3 days - 250mg solumedrol bid now tapered off 4/14/21   cont prednisone 40mg daily  cont asa, vit c,    cont albuterol inhaler   pulm f/u  procalcitonin, D-dimer, crp, ldh, ferritin, lactate noted ,    tmx 100.2    cont tylenol prn,   cont robitussin prn   pt on precedex drip    pt on fentanyl patch  off pressors   s/p intubation 3/29/21   s/p tracheostomy 4/21/21  O2 sat (88% - 100%) mech vent 40%  O2 via mech vent and taper fio2 as tolerated   vent mgmt as per icu  xray 3/19/21 with pneumomediastinum  rept cxr with New trace right apical pneumothorax. New mild left apical pneumothorax. Grossly stable small pneumomediastinum.  Soft tissue emphysema at the neck bases bilaterally. Grossly stable bilateral pulmonary infiltrates noted.   cxr 2/24 with No evidence of pneumothorax can be appreciated on the available image. This may be related to patient positioning. Evidence of pneumomediastinum and subcutaneous emphysema in the lower neck is again noted. There are patchy bibasilar infiltrates and elevated right hemidiaphragm noted.   cxr 3/29/21 with No significant change bilateral infiltrates. There is a small simple left apical pneumothorax. No significant pleural effusion. Bilateral subcutaneous emphysema similar to prior.   XRs on 7AM and 5PM with no obvious increase of ptx  cxr 4/4/21 with Improving bilateral airspace disease noted    cxr 4/8/21 with Small left pneumothorax noted.  thoracic surg f/u   s/p L chest tube replacement 4/18/21 for recurrence of left pneumothorax   cxr 4/20/21 with Unchanged advanced infiltrates and catheter left chest tube noted   CXR 4/11/21 with : No interval change compared to one day prior. No pneumothorax noted.   unable to flush or aspirate tube fully, noted debris in tubing which was milked out.   Once material removed from tubing able to aspirated and flush fully. Also changed dressing on pigtail which appears to be in good position.   Monitor O2 status   s/p tracheostomy 4/21/21   stay sutures out 2 weeks from OR date  cxr 4/21/21 with No evidence of active chest disease. Tracheostomy tube in place otherwise no significant change noted   cxr 4/25/21 with A 40% LEFT pneumothorax. LEFT multi-sidehole pigtail catheter overlies LEFT lower hemithorax.. Bilateral multifocal and diffuse ill-defined airspace opacities..  Follow-up AP portable chest radiograph 4/25/2021 AT 8:58 AM: Residual LEFT 30% upper zone pneumothorax. Otherwise no interval change.noted    cxr 4/30/21 Tracheostomy tube again noted. Left chest tube noted with small left apical pneumothorax unchanged. Bilateral airspace opacities overall worsening. Heart size cannot be accurately assessed in this projection noted.   cxr 5/3/21 with Large left pneumothorax noted above.   cxr 5 4/21 with No significant interval change noted above.   ct chest with Extensive chronic appearing consolidative opacities throughout the lungs, most prominent in the left lower lobe and lingula, with bronchiectasis, scarring, and volume loss. Additional scattered peripheral coarse opacities.   Mild left pneumothorax. Left lateral pleural space pigtail catheter, not in continuity with the pneumothorax. Mild bilateral hydronephrosis, similar to appearance on CT of the abdomen and pelvis on April 27. noted above   s/p 2nd chest tube 5/5/21  cxr 5/6/21 with Tracheostomy and catheter left chest tubes remain. , There are significant diffuse advanced infiltrates again noted. No pneumothorax. Above findings are similar to study earlier in the day. Present film shows a left jugular line inserted   with tip entering the superior vena cava noted above.  s/p surgical placement of gastrostomy tube 4/23/2021.   cont on tube feeding  id f/u   No need for further antibiotics as patient completed course of Meropenem  leukocytosis resolved  speutum cx with Enterobacter aerogenes (Carbapenem Resistant) noted above  andrea   lispro ss   prognosis poor   s/p 4 units prbc for anemia  f/u h/h    transfuse prbc as needed  check vitamin b12  heme onc f/u  retic is elevated.    Haptoglobin normal  ? daily phlebotomy  no hemolysis, Fe/B12/folate adequate  hemolysis is unlikely even his baseline haptoglobin may be very elevated due to COVID  direct pamella neg noted above   COVID is known to cause cold agglutinin  cont current meds  mgmt as per icu        Patient is a 55y old  Male who presents with a chief complaint of SOB (08 May 2021 00:04)    pt seen in icu [ x ], reg med floor [   ], bed [ x ], chair at bedside [   ], awake and responsive [ x ], mildly sedated, [  ],    nad [x  ]      Allergies    No Known Allergies        Vitals    T(F): 99.5 (05-08-21 @ 04:02), Max: 100.2 (05-08-21 @ 00:16)  HR: 130 (05-08-21 @ 04:16) (72 - 135)  BP: 97/44 (05-08-21 @ 00:00) (87/44 - 144/76)  RR: 20 (05-08-21 @ 00:00) (17 - 43)  SpO2: 91% (05-08-21 @ 04:16) (90% - 100%)  Wt(kg): --  CAPILLARY BLOOD GLUCOSE      POCT Blood Glucose.: 155 mg/dL (08 May 2021 06:21)      Labs                          8.2    10.49 )-----------( 352      ( 08 May 2021 04:25 )             27.1       05-08    139  |  97  |  11  ----------------------------<  152<H>  3.4<L>   |  41<H>  |  0.35<L>    Ca    8.3<L>      08 May 2021 04:25  Phos  2.6     05-08  Mg     1.6     05-08    TPro  6.4  /  Alb  2.4<L>  /  TBili  0.8  /  DBili  x   /  AST  12  /  ALT  18  /  AlkPhos  85  05-08            .Sputum Sputum  04-16 @ 04:42   Moderate Enterobacter aerogenes (Carbapenem Resistant)  Normal Respiratory Monserrat present  --  Enterobacter aerogenes (Carbapenem Resistant)      .Urine Clean Catch (Midstream)  03-15 @ 00:52   No growth  --  --          Radiology Results      Meds    MEDICATIONS  (STANDING):  ascorbic acid 1000 milliGRAM(s) Oral daily  BACItracin   Ointment 1 Application(s) Topical two times a day  chlorhexidine 0.12% Liquid 15 milliLiter(s) Oral Mucosa every 12 hours  chlorhexidine 2% Cloths 1 Application(s) Topical <User Schedule>  clonazePAM  Tablet 1 milliGRAM(s) Oral every 8 hours  dexMEDEtomidine Infusion 0.2 MICROgram(s)/kG/Hr (4.2 mL/Hr) IV Continuous <Continuous>  enoxaparin Injectable 40 milliGRAM(s) SubCutaneous every 24 hours  furosemide   Injectable 40 milliGRAM(s) IV Push every 12 hours  insulin lispro (ADMELOG) corrective regimen sliding scale   SubCutaneous every 6 hours  methadone    Tablet 10 milliGRAM(s) Oral every 8 hours  midodrine 5 milliGRAM(s) Oral every 8 hours  OLANZapine 5 milliGRAM(s) Oral two times a day  pantoprazole  Injectable 40 milliGRAM(s) IV Push every 12 hours  polyethylene glycol 3350 17 Gram(s) Oral two times a day  potassium chloride   Powder 40 milliEquivalent(s) Oral every 4 hours  predniSONE   Tablet 40 milliGRAM(s) Oral daily  tamsulosin 0.4 milliGRAM(s) Oral at bedtime  trimethoprim  160 mG/sulfamethoxazole 800 mG 1 Tablet(s) Oral <User Schedule>      MEDICATIONS  (PRN):  acetaminophen    Suspension .. 650 milliGRAM(s) Oral every 12 hours PRN Temp greater or equal to 38C (100.4F)  ALBUTerol    90 MICROgram(s) HFA Inhaler 2 Puff(s) Inhalation every 6 hours PRN Shortness of Breath and/or Wheezing  artificial  tears Solution 1 Drop(s) Both EYES every 4 hours PRN Dry Eyes  LORazepam   Injectable 2 milliGRAM(s) IV Push every 6 hours PRN Agitation  sodium chloride 0.9% lock flush 10 milliLiter(s) IV Push every 1 hour PRN Pre/post blood products, medications, blood draw, and to maintain line patency  sodium chloride 0.9% lock flush 10 milliLiter(s) IV Push every 1 hour PRN Pre/post blood products, medications, blood draw, and to maintain line patency      Physical Exam    Neuro :  no focal deficits  Respiratory: CTA B/L  CV: RRR, S1S2, no murmurs,   Abdominal: Soft, NT, ND +BS, g- tube in place, dressing cdi  Extremities: b/l ue edema, + peripheral pulses      ASSESSMENT    Hypoxemia 2nd to covid pna   transaminitis  prediabetes  h/o appendectomy  cholecystectomy        PLAN    contact and airborne isolation  d/c remdesevir given covid ab positive noted   completed dexamethasone   started pulse steroids for 3 days - 250mg solumedrol bid now tapered off 4/14/21   C/w prednisone 40 daily for possible organizing pneumonia   Started on bactrim empirically, TIW  cont asa, vit c,    cont albuterol inhaler   pulm f/u  procalcitonin, D-dimer, crp, ldh, ferritin, lactate noted ,    tmx 100.2    cont tylenol prn,   cont robitussin prn   pt on precedex drip    pt on fentanyl patch  off pressors   s/p intubation 3/29/21   s/p tracheostomy 4/21/21  O2 sat (90% - 100%) mech vent 40%  O2 via mech vent and taper fio2 as tolerated   vent mgmt as per icu  xray 3/19/21 with pneumomediastinum  rept cxr with New trace right apical pneumothorax. New mild left apical pneumothorax. Grossly stable small pneumomediastinum.  Soft tissue emphysema at the neck bases bilaterally. Grossly stable bilateral pulmonary infiltrates noted.   cxr 2/24 with No evidence of pneumothorax can be appreciated on the available image. This may be related to patient positioning. Evidence of pneumomediastinum and subcutaneous emphysema in the lower neck is again noted. There are patchy bibasilar infiltrates and elevated right hemidiaphragm noted.   cxr 3/29/21 with No significant change bilateral infiltrates. There is a small simple left apical pneumothorax. No significant pleural effusion. Bilateral subcutaneous emphysema similar to prior.   XRs on 7AM and 5PM with no obvious increase of ptx  cxr 4/4/21 with Improving bilateral airspace disease noted    cxr 4/8/21 with Small left pneumothorax noted.  thoracic surg f/u   s/p L chest tube replacement 4/18/21 for recurrence of left pneumothorax   cxr 4/20/21 with Unchanged advanced infiltrates and catheter left chest tube noted   CXR 4/11/21 with : No interval change compared to one day prior. No pneumothorax noted.   unable to flush or aspirate tube fully, noted debris in tubing which was milked out.   Once material removed from tubing able to aspirated and flush fully. Also changed dressing on pigtail which appears to be in good position.   Monitor O2 status   s/p tracheostomy 4/21/21   stay sutures out 2 weeks from OR date  cxr 4/21/21 with No evidence of active chest disease. Tracheostomy tube in place otherwise no significant change noted   cxr 4/25/21 with A 40% LEFT pneumothorax. LEFT multi-sidehole pigtail catheter overlies LEFT lower hemithorax.. Bilateral multifocal and diffuse ill-defined airspace opacities..  Follow-up AP portable chest radiograph 4/25/2021 AT 8:58 AM: Residual LEFT 30% upper zone pneumothorax. Otherwise no interval change.noted    cxr 4/30/21 Tracheostomy tube again noted. Left chest tube noted with small left apical pneumothorax unchanged. Bilateral airspace opacities overall worsening. Heart size cannot be accurately assessed in this projection noted.   cxr 5/3/21 with Large left pneumothorax noted above.   cxr 5 4/21 with No significant interval change noted above.   ct chest with Extensive chronic appearing consolidative opacities throughout the lungs, most prominent in the left lower lobe and lingula, with bronchiectasis, scarring, and volume loss. Additional scattered peripheral coarse opacities.   Mild left pneumothorax. Left lateral pleural space pigtail catheter, not in continuity with the pneumothorax. Mild bilateral hydronephrosis, similar to appearance on CT of the abdomen and pelvis on April 27. noted above   s/p 2nd chest tube 5/5/21  cxr 5/6/21 with Tracheostomy and catheter left chest tubes remain. , There are significant diffuse advanced infiltrates again noted. No pneumothorax. Above findings are similar to study earlier in the day. Present film shows a left jugular line inserted   with tip entering the superior vena cava noted above.  s/p surgical placement of gastrostomy tube 4/23/2021.   cont on tube feeding  id f/u   No need for further antibiotics as patient completed course of Meropenem  leukocytosis resolved  speutum cx with Enterobacter aerogenes (Carbapenem Resistant) noted above  andrea   lispro ss   prognosis poor   s/p 4 units prbc for anemia  f/u h/h    transfuse prbc as needed  check vitamin b12  heme onc f/u  retic is elevated.    Haptoglobin normal  ? daily phlebotomy  no hemolysis, Fe/B12/folate adequate  hemolysis is unlikely even his baseline haptoglobin may be very elevated due to COVID  direct pamella neg noted above   COVID is known to cause cold agglutinin  cont current meds  mgmt as per icu

## 2021-05-08 NOTE — PROGRESS NOTE ADULT - ASSESSMENT
55 yoM s/p left anterior pigtail chest tube 5/6 and left pigtail chest tube 4/18. S/p Tracheostomy 4/21    CT x2 no airleak, continue on suction while vented  AM CXR pending, XR yesterday with expanded L lung    - continue CTs on sxn  - daily cxr  - care per ICU

## 2021-05-08 NOTE — PROGRESS NOTE ADULT - ATTENDING COMMENTS
55 yr old  man , non smoker with  moody 1990s presented 3/14 with x9 days worsening cough, subjective fevers, and SOB, with x2-3 days dysuria and central, non-radiating, constant CP. Admitted to medicine unit  for acute hypoxic respiratory failure secondary to pna from covid-19 infection .     Assessment:  1. Acute hypoxic respiratory failure  2. Covid-19 infection   3. Transaminitis  4. Prediabetes  5. Bilateral pneumothorax  6. Septic shock - resolved  7. Anemia    Plan   -Noted with improvement in left PTX s/p left anterior chest tube placement, no obvious air leak noted   -Transfuse to hgb > 7  -S/p tracheostomy placement 4/21   -S/p G-tube 4/23  -Continue tube feedings  -Cont. mechanical ventilation   -Daily awakening trials   -Daily weaning trials  -Chest tube to suction  -Cont. prednisone 40 mg for possible organizing pneumonia as evidence on CT scan  -Albumin and Lasix for diuresis, aim for negative fluid balance  -Start vasopressors if drops BP, cont. Midodrine  -Fentanyl path  -Increase Clonazepam and zyprexa, titrate down Precedex  -methadone for opioid dependence  -PT evaluation  -dvt/gi prophy  -hemodynamic monitoring   -Prognosis is guarded

## 2021-05-08 NOTE — PROGRESS NOTE ADULT - ASSESSMENT
Assessment and plan: 56 y/o M with no PMH is admitted to ICU for AHRF 2/2 covid19 pna     1. Acute hypoxic respiratory failure  2. ARDS 2/2 Covid pneumonia  3. Pneumothorax  4. Prediabetes  5. Abnormal TSH    =================== Neuro============================  -Alert and oriented x 3 at baseline   -Intubated and sedated 3/29 on Vent  -Trach 4/22 continues on Vent    -Continue Precedex and off Fentanyl drip, adjusting precedex as per requirement  -Was started on [id number] mg BID, will slowly titrate down  - fentanyl patch was d/c but later was restarted   -Started on Ativan PRN, Not helping much   -Off versed, propofol  -Started on klonopin  Will d/c dilaudid, decrease ativan to 2mg Iv q6  -CT head unremarkable     ================= Cardiovascular==========================  # Hypotension   Was hypotensive overnight  Was given 500cc bolus  Will d/c dilaudid    # TACHYCARDIA: recurrent episodes   -HR in 110's  -Continue monitoring     ================- Pulm=================================  #Acute hypoxic respiratory failure: secondary to Covid19 pneumonia  #ARDS  -Was on HFNC then got intubated 3/29  -D-dimer initially elevated on presentation, trending down    -s/p Nimbex and Epifanio at different occasions for vent synchrony  -s/p Pigtail on left chest, removed on 4/15  -Large Pneumothorax was noted on 4/18 on left side, s/p chest tube placement to suction since 4/27, still has pneumothorax-   -Trach 4/22 continues on Vent   -Remdesivir was discontinued due to positive antibodies   - Finished Dexamethasone   - Prednisone taper Finished  - Covid19 PCR negative now, off isolation   - C/w prednisone 40 daily for possible organizing pneumonia   - Started on bactrim empirically, TIW  - Chest tube was kinked, which was fixed by surgery, pneumothorax improved after that  - anterior chest tube was placed 5/6  - CXR shows improvement in PTX; continue to monitor       # Bacterial Pneumonia  - Stable infiltrate on CXR ,likely developed Bacterial pneumonia , Finished ABx Zosyn, meropenem, s/p 1 dose of Vancomycin  - MRSA negative from 3/26  - Sputum Cx growing few gram negative rods, CRE  - Secretions from the trach, needs frequent suctions   - Pulm. Dr. Ryan  - ID Dr Anand       #Pneumothorax and pneumomediastinum:   -X-ray chest: remains with left pneumothorax  -Thoracic surgery following  -s/p Chest tube placed 4/8 pigtail to wall suction discontinued on 4/15  -On 4/18 chest tube was placed back on left lung to treat pneumothorax-  chest tube to suction    -CXR 4/26 still shows left lung pneumothorax - CXR 4/29 persistent pneumothorax     -CXR shows persistent PTX, CT chest shows mild persistent PTX  -anterior chest tube 5/6  CXR shows improvement; continue to monitor       ==================ID===================================  Likely bacterial pneumonia   -leukocytosis resolved  -No more fever, On Tylenol PRN only   -Finished Meropenem  -plan as above     Spiked temp of 101.5 on 5/5  Will send in cultures if spikes again  UA was negative    ================= Nephro================================  -Pitting edema to both arms, ecchymosis on right AC, blisters on left arm around brachial area    -Was retaining urine, andrea was placed 5/5  -monitor I/Os   -Was started on albumin  Has been having low UO  Will start on free water through PEG tube to increase hydration  Will remove peripheral IV lines and put ACE wraps to decrease edema    =================GI====================================  Transaminitis:   likely secondary to Covid19   - and  on presentation   hepatitis panel -ve  -Improved, now normalized   -continue to monitor LFT    Diet:   S/p NGT (3/29) with tube feed  C/w bowel regimen   G tube 4/23, on tube feeds    No BM in last 2 days, will give dulcolax suppository    ================ Heme==================================  Elevated d-dimer: likely secondary to Covid19   -D-dimer 423 on admission  -Last D-dimer 1434  - No active bleeding, restarted lovenox daily    Anemia  On 4/26 Hb 8.8 dropped to 6.8, s/p 1 unit of PRBC repeat hb 7.2  On 4/27 Hb 7, s/p 2 units of PRBC hb remains 7  On 4/28 Hb 6.8, s/p 1 unit of PRBC hb 7.5 now  4 PRBC total    CTH and CT abdomen w/o contrast no evidence of bleed    No active signs of bleeding (No hematemesis, melena or hematuria)  F/u hemolysis w/u- negative so far, elevated reticulocytes   Dr Andrade on board   CBC on 5/3 was 7.7  Will Monitor  F/u CBC daily     =================Endocrine===============================  Prediabetes:  -A1c 5.8  -BS controlled  -continue HSS    Abnormal TSH:  -TSH level noted 0.26,   -Repeat TSH 0.37 and Free T4 1.82    ================= Skin/Catheters============================  No rashes. Peripheral IV lines.   Both arms with pitting edema, right AC with ecchymosis and left AC with blisters     - =================Prophylaxis =============================  On Lovenox for DVT   Protonix for GI proph    ==================GOC==================================   FULL CODE Assessment and plan: 56 y/o M with no PMH is admitted to ICU for AHRF 2/2 covid19 pna     1. Acute hypoxic respiratory failure  2. ARDS 2/2 Covid pneumonia  3. Pneumothorax  4. Prediabetes  5. Abnormal TSH    =================== Neuro============================  -Alert and oriented x 3 at baseline   -Intubated and sedated 3/29 on Vent  -Trach 4/22 continues on Vent    -Continue Precedex and off Fentanyl drip, adjusting precedex as per requirement  -Was started on [id number] mg BID, decreased to 5 tid  - fentanyl patch was d/c but later was restarted   -Started on Ativan PRN, Not helping much   -Off versed, propofol  -increased dose of klonopin  Will d/c dilaudid, decrease ativan to 2mg Iv q6  -CT head unremarkable     ================= Cardiovascular==========================  # Hypotension   Was hypotensive overnight  Was given 500cc bolus  Will d/c dilaudid  Increased dose of midodrine to 10mg tid    # TACHYCARDIA: recurrent episodes   -HR in 110's  -Continue monitoring     ================- Pulm=================================  #Acute hypoxic respiratory failure: secondary to Covid19 pneumonia  #ARDS  -Was on HFNC then got intubated 3/29  -D-dimer initially elevated on presentation, trending down    -s/p Nimbex and Epifanio at different occasions for vent synchrony  -s/p Pigtail on left chest, removed on 4/15  -Large Pneumothorax was noted on 4/18 on left side, s/p chest tube placement to suction since 4/27, still has pneumothorax-   -Trach 4/22 continues on Vent   -Remdesivir was discontinued due to positive antibodies   - Finished Dexamethasone   - Prednisone taper Finished  - Covid19 PCR negative now, off isolation   - C/w prednisone 40 daily for possible organizing pneumonia   - Started on bactrim empirically, TIW  - Chest tube was kinked, which was fixed by surgery, pneumothorax improved after that  - anterior chest tube was placed 5/6  - CXR shows improvement in PTX; continue to monitor       # Bacterial Pneumonia  - Stable infiltrate on CXR ,likely developed Bacterial pneumonia , Finished ABx Zosyn, meropenem, s/p 1 dose of Vancomycin  - MRSA negative from 3/26  - Sputum Cx growing few gram negative rods, CRE  - Secretions from the trach, needs frequent suctions   - Pulm. Dr. Ryan  - ID Dr Anand       #Pneumothorax and pneumomediastinum:   -X-ray chest: remains with left pneumothorax  -Thoracic surgery following  -s/p Chest tube placed 4/8 pigtail to wall suction discontinued on 4/15  -On 4/18 chest tube was placed back on left lung to treat pneumothorax-  chest tube to suction    -CXR 4/26 still shows left lung pneumothorax - CXR 4/29 persistent pneumothorax     -CXR shows persistent PTX, CT chest shows mild persistent PTX  -anterior chest tube 5/6  CXR shows improvement; continue to monitor       ==================ID===================================  Likely bacterial pneumonia   -leukocytosis resolved  -No more fever, On Tylenol PRN only   -Finished Meropenem  -plan as above     Spiked temp of 101.5 on 5/5  Will send in cultures if spikes again  UA was negative    ================= Nephro================================  -Pitting edema to both arms, ecchymosis on right AC, blisters on left arm around brachial area    -Was retaining urine, andrea was placed 5/5  -monitor I/Os   -Was started on albumin, will c/w for another 2 days  c/w lasix  Will start on free water through PEG tube to increase hydration  Will remove peripheral IV lines and put ACE wraps to decrease edema  AVOID IV FLUIDS    =================GI====================================  Transaminitis:   likely secondary to Covid19   - and  on presentation   hepatitis panel -ve  -Improved, now normalized   -continue to monitor LFT    Diet:   S/p NGT (3/29) with tube feed  C/w bowel regimen   G tube 4/23, on tube feeds    BM on 5/7    ================ Heme==================================  Elevated d-dimer: likely secondary to Covid19   -D-dimer 423 on admission  -Last D-dimer 1434  - No active bleeding, restarted lovenox daily    Anemia  On 4/26 Hb 8.8 dropped to 6.8, s/p 1 unit of PRBC repeat hb 7.2  On 4/27 Hb 7, s/p 2 units of PRBC hb remains 7  On 4/28 Hb 6.8, s/p 1 unit of PRBC hb 7.5 now  4 PRBC total    CTH and CT abdomen w/o contrast no evidence of bleed    No active signs of bleeding (No hematemesis, melena or hematuria)  F/u hemolysis w/u- negative so far, elevated reticulocytes   Dr Andrade on board   CBC on 5/3 was 7.7  Will Monitor  F/u CBC daily     =================Endocrine===============================  Prediabetes:  -A1c 5.8  -BS controlled  -continue HSS    Abnormal TSH:  -TSH level noted 0.26,   -Repeat TSH 0.37 and Free T4 1.82    ================= Skin/Catheters============================  No rashes. Peripheral IV lines.   Both arms with pitting edema, right AC with ecchymosis and left AC with blisters     - =================Prophylaxis =============================  On Lovenox for DVT   Protonix for GI proph    ==================GOC==================================   FULL CODE

## 2021-05-09 LAB
ALBUMIN SERPL ELPH-MCNC: 2.5 G/DL — LOW (ref 3.5–5)
ALP SERPL-CCNC: 80 U/L — SIGNIFICANT CHANGE UP (ref 40–120)
ALT FLD-CCNC: 19 U/L DA — SIGNIFICANT CHANGE UP (ref 10–60)
ANION GAP SERPL CALC-SCNC: -1 MMOL/L — LOW (ref 5–17)
AST SERPL-CCNC: 15 U/L — SIGNIFICANT CHANGE UP (ref 10–40)
BASE EXCESS BLDA CALC-SCNC: 19.1 MMOL/L — HIGH (ref -2–3)
BASOPHILS # BLD AUTO: 0.03 K/UL — SIGNIFICANT CHANGE UP (ref 0–0.2)
BASOPHILS NFR BLD AUTO: 0.3 % — SIGNIFICANT CHANGE UP (ref 0–2)
BILIRUB SERPL-MCNC: 0.8 MG/DL — SIGNIFICANT CHANGE UP (ref 0.2–1.2)
BLOOD GAS COMMENTS ARTERIAL: SIGNIFICANT CHANGE UP
BUN SERPL-MCNC: 12 MG/DL — SIGNIFICANT CHANGE UP (ref 7–18)
CALCIUM SERPL-MCNC: 8.1 MG/DL — LOW (ref 8.4–10.5)
CHLORIDE SERPL-SCNC: 99 MMOL/L — SIGNIFICANT CHANGE UP (ref 96–108)
CO2 SERPL-SCNC: 41 MMOL/L — HIGH (ref 22–31)
CREAT SERPL-MCNC: 0.24 MG/DL — LOW (ref 0.5–1.3)
EOSINOPHIL # BLD AUTO: 0.04 K/UL — SIGNIFICANT CHANGE UP (ref 0–0.5)
EOSINOPHIL NFR BLD AUTO: 0.4 % — SIGNIFICANT CHANGE UP (ref 0–6)
GLUCOSE BLDC GLUCOMTR-MCNC: 111 MG/DL — HIGH (ref 70–99)
GLUCOSE BLDC GLUCOMTR-MCNC: 152 MG/DL — HIGH (ref 70–99)
GLUCOSE BLDC GLUCOMTR-MCNC: 155 MG/DL — HIGH (ref 70–99)
GLUCOSE SERPL-MCNC: 101 MG/DL — HIGH (ref 70–99)
HCO3 BLDA-SCNC: 46 MMOL/L — CRITICAL HIGH (ref 21–28)
HCT VFR BLD CALC: 25.5 % — LOW (ref 39–50)
HGB BLD-MCNC: 7.9 G/DL — LOW (ref 13–17)
HOROWITZ INDEX BLDA+IHG-RTO: 40 — SIGNIFICANT CHANGE UP
IMM GRANULOCYTES NFR BLD AUTO: 0.8 % — SIGNIFICANT CHANGE UP (ref 0–1.5)
LYMPHOCYTES # BLD AUTO: 1.53 K/UL — SIGNIFICANT CHANGE UP (ref 1–3.3)
LYMPHOCYTES # BLD AUTO: 14.8 % — SIGNIFICANT CHANGE UP (ref 13–44)
MAGNESIUM SERPL-MCNC: 2 MG/DL — SIGNIFICANT CHANGE UP (ref 1.6–2.6)
MCHC RBC-ENTMCNC: 31 GM/DL — LOW (ref 32–36)
MCHC RBC-ENTMCNC: 31.5 PG — SIGNIFICANT CHANGE UP (ref 27–34)
MCV RBC AUTO: 101.6 FL — HIGH (ref 80–100)
MONOCYTES # BLD AUTO: 0.8 K/UL — SIGNIFICANT CHANGE UP (ref 0–0.9)
MONOCYTES NFR BLD AUTO: 7.7 % — SIGNIFICANT CHANGE UP (ref 2–14)
NEUTROPHILS # BLD AUTO: 7.89 K/UL — HIGH (ref 1.8–7.4)
NEUTROPHILS NFR BLD AUTO: 76 % — SIGNIFICANT CHANGE UP (ref 43–77)
NRBC # BLD: 0 /100 WBCS — SIGNIFICANT CHANGE UP (ref 0–0)
PCO2 BLDA: 62 MMHG — HIGH (ref 35–48)
PH BLDA: 7.48 — HIGH (ref 7.35–7.45)
PHOSPHATE SERPL-MCNC: 2.9 MG/DL — SIGNIFICANT CHANGE UP (ref 2.5–4.5)
PLATELET # BLD AUTO: 342 K/UL — SIGNIFICANT CHANGE UP (ref 150–400)
PO2 BLDA: 62 MMHG — LOW (ref 83–108)
POTASSIUM SERPL-MCNC: 3.9 MMOL/L — SIGNIFICANT CHANGE UP (ref 3.5–5.3)
POTASSIUM SERPL-SCNC: 3.9 MMOL/L — SIGNIFICANT CHANGE UP (ref 3.5–5.3)
PROT SERPL-MCNC: 6.2 G/DL — SIGNIFICANT CHANGE UP (ref 6–8.3)
RBC # BLD: 2.51 M/UL — LOW (ref 4.2–5.8)
RBC # FLD: 16.7 % — HIGH (ref 10.3–14.5)
SAO2 % BLDA: 95 % — SIGNIFICANT CHANGE UP
SODIUM SERPL-SCNC: 139 MMOL/L — SIGNIFICANT CHANGE UP (ref 135–145)
WBC # BLD: 10.37 K/UL — SIGNIFICANT CHANGE UP (ref 3.8–10.5)
WBC # FLD AUTO: 10.37 K/UL — SIGNIFICANT CHANGE UP (ref 3.8–10.5)

## 2021-05-09 PROCEDURE — 71045 X-RAY EXAM CHEST 1 VIEW: CPT | Mod: 26

## 2021-05-09 RX ORDER — FENTANYL CITRATE 50 UG/ML
25 INJECTION INTRAVENOUS ONCE
Refills: 0 | Status: DISCONTINUED | OUTPATIENT
Start: 2021-05-09 | End: 2021-05-09

## 2021-05-09 RX ORDER — PHENYLEPHRINE HYDROCHLORIDE 10 MG/ML
0.02 INJECTION INTRAVENOUS
Qty: 160 | Refills: 0 | Status: DISCONTINUED | OUTPATIENT
Start: 2021-05-09 | End: 2021-05-09

## 2021-05-09 RX ORDER — ACETAZOLAMIDE 250 MG/1
250 TABLET ORAL EVERY 12 HOURS
Refills: 0 | Status: DISCONTINUED | OUTPATIENT
Start: 2021-05-09 | End: 2021-05-10

## 2021-05-09 RX ORDER — NOREPINEPHRINE BITARTRATE/D5W 8 MG/250ML
0.05 PLASTIC BAG, INJECTION (ML) INTRAVENOUS
Qty: 16 | Refills: 0 | Status: DISCONTINUED | OUTPATIENT
Start: 2021-05-09 | End: 2021-05-09

## 2021-05-09 RX ADMIN — Medication 50 MILLILITER(S): at 21:15

## 2021-05-09 RX ADMIN — PANTOPRAZOLE SODIUM 40 MILLIGRAM(S): 20 TABLET, DELAYED RELEASE ORAL at 05:03

## 2021-05-09 RX ADMIN — Medication 2 MILLIGRAM(S): at 21:16

## 2021-05-09 RX ADMIN — POLYETHYLENE GLYCOL 3350 17 GRAM(S): 17 POWDER, FOR SOLUTION ORAL at 19:33

## 2021-05-09 RX ADMIN — Medication 1 APPLICATION(S): at 05:16

## 2021-05-09 RX ADMIN — Medication 2 MILLIGRAM(S): at 20:06

## 2021-05-09 RX ADMIN — METHADONE HYDROCHLORIDE 5 MILLIGRAM(S): 40 TABLET ORAL at 21:16

## 2021-05-09 RX ADMIN — METHADONE HYDROCHLORIDE 5 MILLIGRAM(S): 40 TABLET ORAL at 05:04

## 2021-05-09 RX ADMIN — DEXMEDETOMIDINE HYDROCHLORIDE IN 0.9% SODIUM CHLORIDE 4.2 MICROGRAM(S)/KG/HR: 4 INJECTION INTRAVENOUS at 03:46

## 2021-05-09 RX ADMIN — FENTANYL CITRATE 25 MICROGRAM(S): 50 INJECTION INTRAVENOUS at 22:30

## 2021-05-09 RX ADMIN — Medication 2 MILLIGRAM(S): at 08:20

## 2021-05-09 RX ADMIN — Medication 40 MILLIGRAM(S): at 05:04

## 2021-05-09 RX ADMIN — Medication 2 MILLIGRAM(S): at 13:14

## 2021-05-09 RX ADMIN — DEXMEDETOMIDINE HYDROCHLORIDE IN 0.9% SODIUM CHLORIDE 4.2 MICROGRAM(S)/KG/HR: 4 INJECTION INTRAVENOUS at 13:27

## 2021-05-09 RX ADMIN — METHADONE HYDROCHLORIDE 5 MILLIGRAM(S): 40 TABLET ORAL at 13:14

## 2021-05-09 RX ADMIN — Medication 50 MILLILITER(S): at 05:05

## 2021-05-09 RX ADMIN — Medication 1000 MILLIGRAM(S): at 12:36

## 2021-05-09 RX ADMIN — CHLORHEXIDINE GLUCONATE 15 MILLILITER(S): 213 SOLUTION TOPICAL at 05:03

## 2021-05-09 RX ADMIN — Medication 2 MILLIGRAM(S): at 01:34

## 2021-05-09 RX ADMIN — POLYETHYLENE GLYCOL 3350 17 GRAM(S): 17 POWDER, FOR SOLUTION ORAL at 05:03

## 2021-05-09 RX ADMIN — Medication 2 MILLIGRAM(S): at 05:04

## 2021-05-09 RX ADMIN — PANTOPRAZOLE SODIUM 40 MILLIGRAM(S): 20 TABLET, DELAYED RELEASE ORAL at 18:28

## 2021-05-09 RX ADMIN — MIDODRINE HYDROCHLORIDE 10 MILLIGRAM(S): 2.5 TABLET ORAL at 21:16

## 2021-05-09 RX ADMIN — ENOXAPARIN SODIUM 40 MILLIGRAM(S): 100 INJECTION SUBCUTANEOUS at 12:37

## 2021-05-09 RX ADMIN — DEXMEDETOMIDINE HYDROCHLORIDE IN 0.9% SODIUM CHLORIDE 4.2 MICROGRAM(S)/KG/HR: 4 INJECTION INTRAVENOUS at 01:00

## 2021-05-09 RX ADMIN — OLANZAPINE 10 MILLIGRAM(S): 15 TABLET, FILM COATED ORAL at 18:37

## 2021-05-09 RX ADMIN — DEXMEDETOMIDINE HYDROCHLORIDE IN 0.9% SODIUM CHLORIDE 4.2 MICROGRAM(S)/KG/HR: 4 INJECTION INTRAVENOUS at 11:10

## 2021-05-09 RX ADMIN — Medication 50 MILLILITER(S): at 13:13

## 2021-05-09 RX ADMIN — DEXMEDETOMIDINE HYDROCHLORIDE IN 0.9% SODIUM CHLORIDE 4.2 MICROGRAM(S)/KG/HR: 4 INJECTION INTRAVENOUS at 16:35

## 2021-05-09 RX ADMIN — OLANZAPINE 10 MILLIGRAM(S): 15 TABLET, FILM COATED ORAL at 05:16

## 2021-05-09 RX ADMIN — CHLORHEXIDINE GLUCONATE 1 APPLICATION(S): 213 SOLUTION TOPICAL at 05:04

## 2021-05-09 RX ADMIN — MIDODRINE HYDROCHLORIDE 10 MILLIGRAM(S): 2.5 TABLET ORAL at 13:14

## 2021-05-09 RX ADMIN — DEXMEDETOMIDINE HYDROCHLORIDE IN 0.9% SODIUM CHLORIDE 4.2 MICROGRAM(S)/KG/HR: 4 INJECTION INTRAVENOUS at 20:00

## 2021-05-09 RX ADMIN — Medication 2 MILLIGRAM(S): at 03:26

## 2021-05-09 RX ADMIN — TAMSULOSIN HYDROCHLORIDE 0.4 MILLIGRAM(S): 0.4 CAPSULE ORAL at 21:16

## 2021-05-09 RX ADMIN — Medication 1: at 12:38

## 2021-05-09 RX ADMIN — ACETAZOLAMIDE 105 MILLIGRAM(S): 250 TABLET ORAL at 12:32

## 2021-05-09 RX ADMIN — Medication 1 APPLICATION(S): at 18:39

## 2021-05-09 RX ADMIN — Medication 1: at 17:30

## 2021-05-09 RX ADMIN — FENTANYL CITRATE 25 MICROGRAM(S): 50 INJECTION INTRAVENOUS at 21:44

## 2021-05-09 RX ADMIN — CHLORHEXIDINE GLUCONATE 15 MILLILITER(S): 213 SOLUTION TOPICAL at 18:37

## 2021-05-09 RX ADMIN — MIDODRINE HYDROCHLORIDE 10 MILLIGRAM(S): 2.5 TABLET ORAL at 05:03

## 2021-05-09 RX ADMIN — DEXMEDETOMIDINE HYDROCHLORIDE IN 0.9% SODIUM CHLORIDE 4.2 MICROGRAM(S)/KG/HR: 4 INJECTION INTRAVENOUS at 08:20

## 2021-05-09 RX ADMIN — Medication 40 MILLIGRAM(S): at 18:27

## 2021-05-09 RX ADMIN — Medication 40 MILLIGRAM(S): at 05:03

## 2021-05-09 RX ADMIN — Medication 2 MILLIGRAM(S): at 18:28

## 2021-05-09 RX ADMIN — ACETAZOLAMIDE 105 MILLIGRAM(S): 250 TABLET ORAL at 18:36

## 2021-05-09 NOTE — PROGRESS NOTE ADULT - SUBJECTIVE AND OBJECTIVE BOX
INTERVAL HPI/OVERNIGHT EVENTS: Pt agitated o/n. Maxed on precedex. Required PRN ativan. BP stable off pressors on midodrine. Avoiding fluids.     PRESSORS: [] YES [x] NO     WHICH:    Antimicrobial:  trimethoprim  160 mG/sulfamethoxazole 800 mG 1 Tablet(s) Oral <User Schedule>    Cardiovascular:  furosemide   Injectable 40 milliGRAM(s) IV Push every 12 hours  midodrine 10 milliGRAM(s) Oral every 8 hours  tamsulosin 0.4 milliGRAM(s) Oral at bedtime    Pulmonary:  ALBUTerol    90 MICROgram(s) HFA Inhaler 2 Puff(s) Inhalation every 6 hours PRN    Hematalogic:  enoxaparin Injectable 40 milliGRAM(s) SubCutaneous every 24 hours    Other:  acetaminophen    Suspension .. 650 milliGRAM(s) Oral every 12 hours PRN  albumin human 25% IVPB 50 milliLiter(s) IV Intermittent every 8 hours  artificial  tears Solution 1 Drop(s) Both EYES every 4 hours PRN  ascorbic acid 1000 milliGRAM(s) Oral daily  BACItracin   Ointment 1 Application(s) Topical two times a day  chlorhexidine 0.12% Liquid 15 milliLiter(s) Oral Mucosa every 12 hours  chlorhexidine 2% Cloths 1 Application(s) Topical <User Schedule>  clonazePAM  Tablet 2 milliGRAM(s) Oral every 8 hours  dexMEDEtomidine Infusion 0.2 MICROgram(s)/kG/Hr IV Continuous <Continuous>  insulin lispro (ADMELOG) corrective regimen sliding scale   SubCutaneous every 6 hours  LORazepam   Injectable 2 milliGRAM(s) IV Push every 6 hours PRN  methadone    Tablet 5 milliGRAM(s) Oral every 8 hours  OLANZapine 10 milliGRAM(s) Oral every 12 hours  pantoprazole  Injectable 40 milliGRAM(s) IV Push every 12 hours  polyethylene glycol 3350 17 Gram(s) Oral two times a day  predniSONE   Tablet 40 milliGRAM(s) Oral daily  sodium chloride 0.9% lock flush 10 milliLiter(s) IV Push every 1 hour PRN  sodium chloride 0.9% lock flush 10 milliLiter(s) IV Push every 1 hour PRN    acetaminophen    Suspension .. 650 milliGRAM(s) Oral every 12 hours PRN  albumin human 25% IVPB 50 milliLiter(s) IV Intermittent every 8 hours  ALBUTerol    90 MICROgram(s) HFA Inhaler 2 Puff(s) Inhalation every 6 hours PRN  artificial  tears Solution 1 Drop(s) Both EYES every 4 hours PRN  ascorbic acid 1000 milliGRAM(s) Oral daily  BACItracin   Ointment 1 Application(s) Topical two times a day  chlorhexidine 0.12% Liquid 15 milliLiter(s) Oral Mucosa every 12 hours  chlorhexidine 2% Cloths 1 Application(s) Topical <User Schedule>  clonazePAM  Tablet 2 milliGRAM(s) Oral every 8 hours  dexMEDEtomidine Infusion 0.2 MICROgram(s)/kG/Hr IV Continuous <Continuous>  enoxaparin Injectable 40 milliGRAM(s) SubCutaneous every 24 hours  furosemide   Injectable 40 milliGRAM(s) IV Push every 12 hours  insulin lispro (ADMELOG) corrective regimen sliding scale   SubCutaneous every 6 hours  LORazepam   Injectable 2 milliGRAM(s) IV Push every 6 hours PRN  methadone    Tablet 5 milliGRAM(s) Oral every 8 hours  midodrine 10 milliGRAM(s) Oral every 8 hours  OLANZapine 10 milliGRAM(s) Oral every 12 hours  pantoprazole  Injectable 40 milliGRAM(s) IV Push every 12 hours  polyethylene glycol 3350 17 Gram(s) Oral two times a day  predniSONE   Tablet 40 milliGRAM(s) Oral daily  sodium chloride 0.9% lock flush 10 milliLiter(s) IV Push every 1 hour PRN  sodium chloride 0.9% lock flush 10 milliLiter(s) IV Push every 1 hour PRN  tamsulosin 0.4 milliGRAM(s) Oral at bedtime  trimethoprim  160 mG/sulfamethoxazole 800 mG 1 Tablet(s) Oral <User Schedule>    Drug Dosing Weight  Height (cm): 167.6 (2021 23:15)  Weight (kg): 83.9 (2021 23:15)  BMI (kg/m2): 29.9 (2021 23:15)  BSA (m2): 1.93 (2021 23:15)    CENTRAL LINE: [x] YES [] NO  LOCATION: J   DATE INSERTED:   REMOVE: [] YES [] NO  EXPLAIN:    RIVERA: [x] YES [] NO    DATE INSERTED:   REMOVE:  [] YES [] NO  EXPLAIN: Strict IOs    A-LINE:  [] YES [x] NO  LOCATION:   DATE INSERTED:  REMOVE:  [] YES [] NO  EXPLAIN:    PMH -reviewed admission note, no change since admission  PAST MEDICAL & SURGICAL HISTORY:  No pertinent past medical history    S/P moody      S/P appendectomy          ICU Vital Signs Last 24 Hrs  T(C): 36.7 (09 May 2021 00:24), Max: 37.5 (08 May 2021 04:02)  T(F): 98.1 (09 May 2021 00:24), Max: 99.5 (08 May 2021 04:02)  HR: 72 (09 May 2021 00:04) (61 - 163)  BP: 91/54 (08 May 2021 23:00) (78/43 - 160/74)  BP(mean): 63 (08 May 2021 23:00) (42 - 102)  ABP: --  ABP(mean): --  RR: 20 (08 May 2021 23:00) (20 - 39)  SpO2: 100% (09 May 2021 00:04) (88% - 100%)      ABG - ( 08 May 2021 04:32 )  pH, Arterial: 7.41  pH, Blood: x     /  pCO2: 73    /  pO2: 69    / HCO3: 46    / Base Excess: 17.6  /  SaO2: 94                    05-07 @ 07:01  -  05-08 @ 07:00  --------------------------------------------------------  IN: 3382 mL / OUT: 3306 mL / NET: 76 mL        Mode: AC/ CMV (Assist Control/ Continuous Mandatory Ventilation)  RR (machine): 20  TV (machine): 400  FiO2: 40  PEEP: 5  ITime: 1  MAP: 12  PIP: 34      REVIEW OF SYSTEMS:    Unable to complete - patient intubated          PHYSICAL EXAM:    GENERAL: Patient seen resting in bed and in no apparent distress   HEAD: Atraumatic, Normocephalic  EYES: PERRLA, conjunctiva and sclera clear  ENMT: Moist mucous membranes, Good dentition, No lesions  NECK: Supple, normal appearance   NERVOUS SYSTEM: Alert & Oriented, Good concentration   CHEST/LUNG: No chest deformity; crackles present to ausculation; chest tubes noted in place on left side   HEART: Regular rate and rhythm; No murmurs   ABDOMEN: Soft, Nontender, Nondistended; Bowel sounds present; PEG tube in place  EXTREMITIES: No clubbing, cyanosis, or edema  LYMPH: No lymphadenopathy noted  SKIN: No rashes or lesions     LABS:  CBC Full  -  ( 08 May 2021 04:25 )  WBC Count : 10.49 K/uL  RBC Count : 2.65 M/uL  Hemoglobin : 8.2 g/dL  Hematocrit : 27.1 %  Platelet Count - Automated : 352 K/uL  Mean Cell Volume : 102.3 fl  Mean Cell Hemoglobin : 30.9 pg  Mean Cell Hemoglobin Concentration : 30.3 gm/dL  Auto Neutrophil # : 6.24 K/uL  Auto Lymphocyte # : 3.55 K/uL  Auto Monocyte # : 0.55 K/uL  Auto Eosinophil # : 0.06 K/uL  Auto Basophil # : 0.02 K/uL  Auto Neutrophil % : 59.5 %  Auto Lymphocyte % : 33.8 %  Auto Monocyte % : 5.2 %  Auto Eosinophil % : 0.6 %  Auto Basophil % : 0.2 %    05-08    138  |  97  |  13  ----------------------------<  172<H>  4.3   |  43<H>  |  0.33<L>    Ca    7.9<L>      08 May 2021 16:25  Phos  2.1     05-08  Mg     2.2     05-08    TPro  6.4  /  Alb  2.4<L>  /  TBili  0.8  /  DBili  x   /  AST  12  /  ALT  18  /  AlkPhos  85  05-08      Urinalysis Basic - ( 07 May 2021 09:07 )    Color: Yellow / Appearance: Clear / S.020 / pH: x  Gluc: x / Ketone: Moderate  / Bili: Small / Urobili: 12   Blood: x / Protein: 30 mg/dL / Nitrite: Positive   Leuk Esterase: Trace / RBC: 10-25 /HPF / WBC 3-5 /HPF   Sq Epi: x / Non Sq Epi: x / Bacteria: Moderate /HPF          RADIOLOGY & ADDITIONAL STUDIES REVIEWED:  ***      GOALS OF CARE DISCUSSION WITH PATIENT/FAMILY/PROXY:

## 2021-05-09 NOTE — PROGRESS NOTE ADULT - ASSESSMENT
Assessment and plan: 56 y/o M with no PMH is admitted to ICU for AHRF 2/2 covid19 pna     1. Acute hypoxic respiratory failure  2. ARDS 2/2 Covid pneumonia  3. Pneumothorax  4. Prediabetes  5. Abnormal TSH    =================== Neuro============================  -Alert and oriented x 3 at baseline   -Intubated and sedated 3/29 on Vent  -Trach 4/22 continues on Vent    -Continue Precedex   -Increased [id number] mg BID,  - fentanyl patch was d/c but later was restarted   -Started on Ativan PRN, Not helping much   -Off versed, propofol  -increased dose of klonopin  Will d/c dilaudid, decrease ativan to 2mg Iv q6  -CT head unremarkable     ================= Cardiovascular==========================  # Hypotension   Stable off pressors  Increased dose of midodrine to 10mg tid    # TACHYCARDIA: recurrent episodes   -HR in 110's  - Ativan PRN for agitation  -Continue monitoring     ================- Pulm=================================  #Acute hypoxic respiratory failure: secondary to Covid19 pneumonia  #ARDS  -Was on HFNC then got intubated 3/29  -D-dimer initially elevated on presentation, trending down    -s/p Nimbex and Epifanio at different occasions for vent synchrony  -s/p Pigtail on left chest, removed on 4/15  -Large Pneumothorax was noted on 4/18 on left side, s/p chest tube placement to suction since 4/27, still has pneumothorax-   -Trach 4/22 continues on Vent   -Remdesivir was discontinued due to positive antibodies   - Finished Dexamethasone   - Prednisone taper Finished  - Covid19 PCR negative now, off isolation   - C/w prednisone 40 daily for possible organizing pneumonia   - Started on bactrim empirically, TIW  - Chest tube was kinked, which was fixed by surgery, pneumothorax improved after that  - anterior chest tube was placed 5/6  - CXR shows improvement in PTX; continue to monitor       # Bacterial Pneumonia  - Stable infiltrate on CXR ,likely developed Bacterial pneumonia , Finished ABx Zosyn, meropenem, s/p 1 dose of Vancomycin  - MRSA negative from 3/26  - Sputum Cx growing few gram negative rods, CRE  - Secretions from the trach, needs frequent suctions   - Pulm. Dr. Ryan  - ID Dr Anand       #Pneumothorax and pneumomediastinum:   -X-ray chest: remains with left pneumothorax  -Thoracic surgery following  -s/p Chest tube placed 4/8 pigtail to wall suction discontinued on 4/15  -On 4/18 chest tube was placed back on left lung to treat pneumothorax-  chest tube to suction    -CXR 4/26 still shows left lung pneumothorax - CXR 4/29 persistent pneumothorax     -CXR shows persistent PTX, CT chest shows mild persistent PTX  -anterior chest tube 5/6  CXR shows improvement; continue to monitor       ==================ID===================================  Likely bacterial pneumonia   -leukocytosis resolved  -No more fever, On Tylenol PRN only   -Finished Meropenem  -plan as above     Spiked temp of 101.5 on 5/5  Will send in cultures if spikes again  UA was negative    ================= Nephro================================  -Pitting edema to both arms, ecchymosis on right AC, blisters on left arm around brachial area    -Was retaining urine, andrea was placed 5/5  -monitor I/Os   -Was started on albumin, will c/w for another 2 days  c/w lasix  Will start on free water through PEG tube to increase hydration  Will remove peripheral IV lines and put ACE wraps to decrease edema  AVOID IV FLUIDS    =================GI====================================  Transaminitis:   likely secondary to Covid19   - and  on presentation   hepatitis panel -ve  -Improved, now normalized   -continue to monitor LFT    Diet:   S/p NGT (3/29) with tube feed  C/w bowel regimen   G tube 4/23, on tube feeds    BM on 5/7    ================ Heme==================================  Elevated d-dimer: likely secondary to Covid19   -D-dimer 423 on admission  -Last D-dimer 1434  - No active bleeding, restarted lovenox daily    Anemia  On 4/26 Hb 8.8 dropped to 6.8, s/p 1 unit of PRBC repeat hb 7.2  On 4/27 Hb 7, s/p 2 units of PRBC hb remains 7  On 4/28 Hb 6.8, s/p 1 unit of PRBC hb 7.5 now  4 PRBC total    CTH and CT abdomen w/o contrast no evidence of bleed    No active signs of bleeding (No hematemesis, melena or hematuria)  F/u hemolysis w/u- negative so far, elevated reticulocytes   Dr Andrade on board   CBC on 5/3 was 7.7  Will Monitor  F/u CBC daily     =================Endocrine===============================  Prediabetes:  -A1c 5.8  -BS controlled  -continue HSS    Abnormal TSH:  -TSH level noted 0.26,   -Repeat TSH 0.37 and Free T4 1.82    ================= Skin/Catheters============================  No rashes. Peripheral IV lines.   Both arms with pitting edema, right AC with ecchymosis and left AC with blisters     - =================Prophylaxis =============================  On Lovenox for DVT   Protonix for GI proph    ==================GOC==================================   FULL CODE Assessment and plan: 56 y/o M with no PMH is admitted to ICU for AHRF 2/2 covid19 pna     1. Acute hypoxic respiratory failure  2. ARDS 2/2 Covid pneumonia  3. Pneumothorax  4. Prediabetes  5. Abnormal TSH    =================== Neuro============================  -Alert and oriented x 3 at baseline   -Intubated and sedated 3/29 on Vent  -Trach 4/22 continues on Vent    -Continue Precedex   -Increased [id number] mg BID,  - fentanyl patch was d/c but later was restarted   -Started on Ativan PRN, Not helping much   -Off versed, propofol  -increased dose of klonopin  Will d/c dilaudid, decrease ativan to 2mg Iv q6  -CT head unremarkable     ================= Cardiovascular==========================  # Hypotension   Stable off pressors  Increased dose of midodrine to 10mg tid    # TACHYCARDIA: recurrent episodes   -HR in 110's  - Ativan PRN for agitation  -Continue monitoring     ================- Pulm=================================  #Acute hypoxic respiratory failure: secondary to Covid19 pneumonia  #ARDS  -Was on HFNC then got intubated 3/29  -D-dimer initially elevated on presentation, trending down    -s/p Nimbex and Epifanio at different occasions for vent synchrony  -s/p Pigtail on left chest, removed on 4/15  -Large Pneumothorax was noted on 4/18 on left side, s/p chest tube placement to suction since 4/27, still has pneumothorax-   -Trach 4/22 continues on Vent   -Remdesivir was discontinued due to positive antibodies   - Finished Dexamethasone   - Prednisone taper Finished  - Covid19 PCR negative now, off isolation   - C/w prednisone 40 daily for possible organizing pneumonia   - Started on bactrim empirically, TIW  - Chest tube was kinked, which was fixed by surgery, pneumothorax improved after that  - anterior chest tube was placed 5/6  - CXR shows improvement in PTX; continue to monitor       # Bacterial Pneumonia  - Stable infiltrate on CXR ,likely developed Bacterial pneumonia , Finished ABx Zosyn, meropenem, s/p 1 dose of Vancomycin  - MRSA negative from 3/26  - Sputum Cx growing few gram negative rods, CRE  - Secretions from the trach, needs frequent suctions   - Pulm. Dr. Ryan  - ID Dr Anand       #Pneumothorax and pneumomediastinum:   -X-ray chest: remains with left pneumothorax  -Thoracic surgery following  -s/p Chest tube placed 4/8 pigtail to wall suction discontinued on 4/15  -On 4/18 chest tube was placed back on left lung to treat pneumothorax-  chest tube to suction    -CXR 4/26 still shows left lung pneumothorax - CXR 4/29 persistent pneumothorax     -CXR shows persistent PTX, CT chest shows mild persistent PTX  -anterior chest tube 5/6  CXR shows improvement; continue to monitor       ==================ID===================================  Likely bacterial pneumonia   -leukocytosis resolved  -No more fever, On Tylenol PRN only   -Finished Meropenem  -plan as above     Spiked temp of 101.5 on 5/5  Will send in cultures if spikes again  UA was negative    ================= Nephro================================  -Pitting edema to both arms, ecchymosis on right AC, blisters on left arm around brachial area    -Was retaining urine, andrea was placed 5/5  -monitor I/Os   -Was started on albumin, will c/w for another 2 days  c/w lasix  Start diamox 250 q12 x2days  Will start on free water through PEG tube to increase hydration  Will remove peripheral IV lines and put ACE wraps to decrease edema  AVOID IV FLUIDS    =================GI====================================  Transaminitis:   likely secondary to Covid19   - and  on presentation   hepatitis panel -ve  -Improved, now normalized   -continue to monitor LFT    Diet:   S/p NGT (3/29) with tube feed  C/w bowel regimen   G tube 4/23, on tube feeds    BM on 5/7    ================ Heme==================================  Elevated d-dimer: likely secondary to Covid19   -D-dimer 423 on admission  -Last D-dimer 1434  - No active bleeding, restarted lovenox daily    Anemia  On 4/26 Hb 8.8 dropped to 6.8, s/p 1 unit of PRBC repeat hb 7.2  On 4/27 Hb 7, s/p 2 units of PRBC hb remains 7  On 4/28 Hb 6.8, s/p 1 unit of PRBC hb 7.5 now  4 PRBC total    CTH and CT abdomen w/o contrast no evidence of bleed    No active signs of bleeding (No hematemesis, melena or hematuria)  F/u hemolysis w/u- negative so far, elevated reticulocytes   Dr Andrade on board   CBC on 5/3 was 7.7  Will Monitor  F/u CBC daily     =================Endocrine===============================  Prediabetes:  -A1c 5.8  -BS controlled  -continue HSS    Abnormal TSH:  -TSH level noted 0.26,   -Repeat TSH 0.37 and Free T4 1.82    ================= Skin/Catheters============================  No rashes. Peripheral IV lines.   Both arms with pitting edema, right AC with ecchymosis and left AC with blisters     - =================Prophylaxis =============================  On Lovenox for DVT   Protonix for GI proph    ==================GOC==================================   FULL CODE

## 2021-05-09 NOTE — PROGRESS NOTE ADULT - ATTENDING COMMENTS
55 yr old  man , non smoker with  moody 1990s presented 3/14 with x9 days worsening cough, subjective fevers, and SOB, with x2-3 days dysuria and central, non-radiating, constant CP. Admitted to medicine unit  for acute hypoxic respiratory failure secondary to pna from covid-19 infection .     Assessment:  1. Acute hypoxic respiratory failure  2. Covid-19 infection   3. Transaminitis  4. Prediabetes  5. Bilateral pneumothorax  6. Septic shock - resolved  7. Anemia    Plan   -Noted with improvement in left PTX s/p left anterior chest tube placement, no obvious air leak noted   -Transfuse to hgb > 7  -S/p tracheostomy placement 4/21   -S/p G-tube 4/23  -Continue tube feedings  -Cont. mechanical ventilation   -Daily awakening trials   -Daily weaning trials  -Chest tube to suction  -Cont. prednisone 40 mg for possible organizing pneumonia as evidence on CT scan  -Albumin and Lasix for diuresis, aim for negative fluid balance  -Start vasopressors if drops BP, cont. Midodrine  -Fentanyl path  -Clonazepam and zyprexa, titrate down Precedex  -methadone for opioid dependence  -PT evaluation  -dvt/gi prophy  -hemodynamic monitoring   -Prognosis is guarded

## 2021-05-09 NOTE — PROGRESS NOTE ADULT - SUBJECTIVE AND OBJECTIVE BOX
Patient is a 55y old  Male who presents with a chief complaint of SOB (09 May 2021 08:06)    Patient remains on ventilator via trach. Awake ,alert lying in bed in Not acute distress.    INTERVAL HPI/OVERNIGHT EVENTS:      VITAL SIGNS:  T(F): 99.5 (05-09-21 @ 09:00)  HR: 134 (05-09-21 @ 09:20)  BP: 118/70 (05-09-21 @ 09:00)  RR: 39 (05-09-21 @ 09:00)  SpO2: 95% (05-09-21 @ 09:20)  Wt(kg): --  I&O's Detail    08 May 2021 07:01  -  09 May 2021 07:00  --------------------------------------------------------  IN:    Albumin 5%  - 250 mL: 150 mL    Dexmedetomidine: 756 mL    Enteral Tube Flush: 150 mL    Free Water: 500 mL    IV PiggyBack: 50 mL    IV PiggyBack: 250 mL    Osmolite 1.2: 960 mL  Total IN: 2816 mL    OUT:    Chest Tube (mL): 20 mL    Chest Tube (mL): 30 mL    Indwelling Catheter - Urethral (mL): 3275 mL  Total OUT: 3325 mL    Total NET: -509 mL        Mode: AC/ CMV (Assist Control/ Continuous Mandatory Ventilation)  RR (machine): 20  FiO2: 40  PEEP: 5  ITime: 1  MAP: 12  PIP: 44        REVIEW OF SYSTEMS:    CONSTITUTIONAL:  No fevers, chills, sweats    HEENT:  Eyes:  No diplopia or blurred vision. ENT:  No earache, sore throat or runny nose.    CARDIOVASCULAR:  No pressure, squeezing, tightness, or heaviness about the chest; no palpitations.    RESPIRATORY:  Per HPI    GASTROINTESTINAL:  No abdominal pain, nausea, vomiting or diarrhea.    GENITOURINARY:  No dysuria, frequency or urgency.    NEUROLOGIC:  No paresthesias, fasciculations, seizures or weakness.    PSYCHIATRIC:  No disorder of thought or mood.      PHYSICAL EXAM:    Constitutional: Well developed and nourished  Eyes:Perrla  ENMT: normal  Neck:supple  Respiratory: + Ronchi bilaterally  Cardiovascular: S1 S2 regular  Gastrointestinal: Soft, Non tender  Extremities: No edema  Vascular:normal  Neurological:Awake, alert  Musculoskeletal:Normal      MEDICATIONS  (STANDING):  acetaZOLAMIDE  IVPB 250 milliGRAM(s) IV Intermittent every 12 hours  albumin human 25% IVPB 50 milliLiter(s) IV Intermittent every 8 hours  ascorbic acid 1000 milliGRAM(s) Oral daily  BACItracin   Ointment 1 Application(s) Topical two times a day  chlorhexidine 0.12% Liquid 15 milliLiter(s) Oral Mucosa every 12 hours  chlorhexidine 2% Cloths 1 Application(s) Topical <User Schedule>  clonazePAM  Tablet 2 milliGRAM(s) Oral every 8 hours  dexMEDEtomidine Infusion 0.2 MICROgram(s)/kG/Hr (4.2 mL/Hr) IV Continuous <Continuous>  enoxaparin Injectable 40 milliGRAM(s) SubCutaneous every 24 hours  furosemide   Injectable 40 milliGRAM(s) IV Push every 12 hours  insulin lispro (ADMELOG) corrective regimen sliding scale   SubCutaneous every 6 hours  methadone    Tablet 5 milliGRAM(s) Oral every 8 hours  midodrine 10 milliGRAM(s) Oral every 8 hours  OLANZapine 10 milliGRAM(s) Oral every 12 hours  pantoprazole  Injectable 40 milliGRAM(s) IV Push every 12 hours  polyethylene glycol 3350 17 Gram(s) Oral two times a day  predniSONE   Tablet 40 milliGRAM(s) Oral daily  tamsulosin 0.4 milliGRAM(s) Oral at bedtime  trimethoprim  160 mG/sulfamethoxazole 800 mG 1 Tablet(s) Oral <User Schedule>    MEDICATIONS  (PRN):  acetaminophen    Suspension .. 650 milliGRAM(s) Oral every 12 hours PRN Temp greater or equal to 38C (100.4F)  ALBUTerol    90 MICROgram(s) HFA Inhaler 2 Puff(s) Inhalation every 6 hours PRN Shortness of Breath and/or Wheezing  artificial  tears Solution 1 Drop(s) Both EYES every 4 hours PRN Dry Eyes  LORazepam   Injectable 2 milliGRAM(s) IV Push every 6 hours PRN Agitation  sodium chloride 0.9% lock flush 10 milliLiter(s) IV Push every 1 hour PRN Pre/post blood products, medications, blood draw, and to maintain line patency  sodium chloride 0.9% lock flush 10 milliLiter(s) IV Push every 1 hour PRN Pre/post blood products, medications, blood draw, and to maintain line patency      Allergies    No Known Allergies    Intolerances        LABS:                        7.9    10.37 )-----------( 342      ( 09 May 2021 06:20 )             25.5     05-09    139  |  99  |  12  ----------------------------<  101<H>  3.9   |  41<H>  |  0.24<L>    Ca    8.1<L>      09 May 2021 06:20  Phos  2.9     05-09  Mg     2.0     05-09    TPro  6.2  /  Alb  2.5<L>  /  TBili  0.8  /  DBili  x   /  AST  15  /  ALT  19  /  AlkPhos  80  05-09        ABG - ( 09 May 2021 04:20 )  pH, Arterial: 7.48  pH, Blood: x     /  pCO2: 62    /  pO2: 62    / HCO3: 46    / Base Excess: 19.1  /  SaO2: 95                    CAPILLARY BLOOD GLUCOSE      POCT Blood Glucose.: 111 mg/dL (09 May 2021 06:11)  POCT Blood Glucose.: 107 mg/dL (08 May 2021 23:56)  POCT Blood Glucose.: 171 mg/dL (08 May 2021 17:52)  POCT Blood Glucose.: 185 mg/dL (08 May 2021 16:30)    pro-bnp -- 05-04 @ 03:49     d-dimer 2819  05-04 @ 03:49      RADIOLOGY & ADDITIONAL TESTS:    CXR:  < from: Xray Chest 1 View- PORTABLE-Routine (Xray Chest 1 View- PORTABLE-Routine in AM.) (05.07.21 @ 10:58) >  INTERPRETATION:  AP supine chest on May 7, 2021 at 8:53 AM. Patient is short of breath.    Heart magnified by technique but likely enlarged.    Tracheostomy, left jugular line, and 2 catheter left chest tubes remain.    Significant mid and upper lung field infiltrates are unchanged. No pneumothorax. Retrocardiac infiltrate unchanged.    Chest is similar to May 6 area    IMPRESSION: Unchanged chest as above.    < end of copied text >    Ct scan chest:    ekg;    echo:      [facility] Patient is a 55y old  Male who presents with a chief complaint of SOB (09 May 2021 08:06)    Patient remains on ventilator via trach. Sedated in NAD. Clinically unchanged.    INTERVAL HPI/OVERNIGHT EVENTS:      VITAL SIGNS:  T(F): 99.5 (05-09-21 @ 09:00)  HR: 134 (05-09-21 @ 09:20)  BP: 118/70 (05-09-21 @ 09:00)  RR: 39 (05-09-21 @ 09:00)  SpO2: 95% (05-09-21 @ 09:20)  Wt(kg): --  I&O's Detail    08 May 2021 07:01  -  09 May 2021 07:00  --------------------------------------------------------  IN:    Albumin 5%  - 250 mL: 150 mL    Dexmedetomidine: 756 mL    Enteral Tube Flush: 150 mL    Free Water: 500 mL    IV PiggyBack: 50 mL    IV PiggyBack: 250 mL    Osmolite 1.2: 960 mL  Total IN: 2816 mL    OUT:    Chest Tube (mL): 20 mL    Chest Tube (mL): 30 mL    Indwelling Catheter - Urethral (mL): 3275 mL  Total OUT: 3325 mL    Total NET: -509 mL        Mode: AC/ CMV (Assist Control/ Continuous Mandatory Ventilation)  RR (machine): 20  FiO2: 40  PEEP: 5  ITime: 1  MAP: 12  PIP: 44        REVIEW OF SYSTEMS:    CONSTITUTIONAL:  No fevers, chills, sweats    HEENT:  Eyes:  No diplopia or blurred vision. ENT:  No earache, sore throat or runny nose.    CARDIOVASCULAR:  No pressure, squeezing, tightness, or heaviness about the chest; no palpitations.    RESPIRATORY:  Per HPI    GASTROINTESTINAL:  No abdominal pain, nausea, vomiting or diarrhea.    GENITOURINARY:  No dysuria, frequency or urgency.    NEUROLOGIC:  No paresthesias, fasciculations, seizures or weakness.    PSYCHIATRIC:  No disorder of thought or mood.      PHYSICAL EXAM:    Constitutional: Well developed and nourished  Eyes:Perrla  ENMT: normal  Neck:supple  Respiratory: + Ronchi bilaterally  Cardiovascular: S1 S2 regular  Gastrointestinal: Soft, Non tender  Extremities: No edema  Vascular:normal  Neurological:Sedated  Musculoskeletal:Normal      MEDICATIONS  (STANDING):  acetaZOLAMIDE  IVPB 250 milliGRAM(s) IV Intermittent every 12 hours  albumin human 25% IVPB 50 milliLiter(s) IV Intermittent every 8 hours  ascorbic acid 1000 milliGRAM(s) Oral daily  BACItracin   Ointment 1 Application(s) Topical two times a day  chlorhexidine 0.12% Liquid 15 milliLiter(s) Oral Mucosa every 12 hours  chlorhexidine 2% Cloths 1 Application(s) Topical <User Schedule>  clonazePAM  Tablet 2 milliGRAM(s) Oral every 8 hours  dexMEDEtomidine Infusion 0.2 MICROgram(s)/kG/Hr (4.2 mL/Hr) IV Continuous <Continuous>  enoxaparin Injectable 40 milliGRAM(s) SubCutaneous every 24 hours  furosemide   Injectable 40 milliGRAM(s) IV Push every 12 hours  insulin lispro (ADMELOG) corrective regimen sliding scale   SubCutaneous every 6 hours  methadone    Tablet 5 milliGRAM(s) Oral every 8 hours  midodrine 10 milliGRAM(s) Oral every 8 hours  OLANZapine 10 milliGRAM(s) Oral every 12 hours  pantoprazole  Injectable 40 milliGRAM(s) IV Push every 12 hours  polyethylene glycol 3350 17 Gram(s) Oral two times a day  predniSONE   Tablet 40 milliGRAM(s) Oral daily  tamsulosin 0.4 milliGRAM(s) Oral at bedtime  trimethoprim  160 mG/sulfamethoxazole 800 mG 1 Tablet(s) Oral <User Schedule>    MEDICATIONS  (PRN):  acetaminophen    Suspension .. 650 milliGRAM(s) Oral every 12 hours PRN Temp greater or equal to 38C (100.4F)  ALBUTerol    90 MICROgram(s) HFA Inhaler 2 Puff(s) Inhalation every 6 hours PRN Shortness of Breath and/or Wheezing  artificial  tears Solution 1 Drop(s) Both EYES every 4 hours PRN Dry Eyes  LORazepam   Injectable 2 milliGRAM(s) IV Push every 6 hours PRN Agitation  sodium chloride 0.9% lock flush 10 milliLiter(s) IV Push every 1 hour PRN Pre/post blood products, medications, blood draw, and to maintain line patency  sodium chloride 0.9% lock flush 10 milliLiter(s) IV Push every 1 hour PRN Pre/post blood products, medications, blood draw, and to maintain line patency      Allergies    No Known Allergies    Intolerances        LABS:                        7.9    10.37 )-----------( 342      ( 09 May 2021 06:20 )             25.5     05-09    139  |  99  |  12  ----------------------------<  101<H>  3.9   |  41<H>  |  0.24<L>    Ca    8.1<L>      09 May 2021 06:20  Phos  2.9     05-09  Mg     2.0     05-09    TPro  6.2  /  Alb  2.5<L>  /  TBili  0.8  /  DBili  x   /  AST  15  /  ALT  19  /  AlkPhos  80  05-09        ABG - ( 09 May 2021 04:20 )  pH, Arterial: 7.48  pH, Blood: x     /  pCO2: 62    /  pO2: 62    / HCO3: 46    / Base Excess: 19.1  /  SaO2: 95                    CAPILLARY BLOOD GLUCOSE      POCT Blood Glucose.: 111 mg/dL (09 May 2021 06:11)  POCT Blood Glucose.: 107 mg/dL (08 May 2021 23:56)  POCT Blood Glucose.: 171 mg/dL (08 May 2021 17:52)  POCT Blood Glucose.: 185 mg/dL (08 May 2021 16:30)    pro-bnp -- 05-04 @ 03:49     d-dimer 2819  05-04 @ 03:49      RADIOLOGY & ADDITIONAL TESTS:    CXR:  < from: Xray Chest 1 View- PORTABLE-Routine (Xray Chest 1 View- PORTABLE-Routine in AM.) (05.07.21 @ 10:58) >  INTERPRETATION:  AP supine chest on May 7, 2021 at 8:53 AM. Patient is short of breath.    Heart magnified by technique but likely enlarged.    Tracheostomy, left jugular line, and 2 catheter left chest tubes remain.    Significant mid and upper lung field infiltrates are unchanged. No pneumothorax. Retrocardiac infiltrate unchanged.    Chest is similar to May 6 area    IMPRESSION: Unchanged chest as above.    < end of copied text >    Ct scan chest:    ekg;    echo:      [facility]

## 2021-05-10 LAB
ALBUMIN SERPL ELPH-MCNC: 3.2 G/DL — LOW (ref 3.5–5)
ALP SERPL-CCNC: 95 U/L — SIGNIFICANT CHANGE UP (ref 40–120)
ALT FLD-CCNC: 21 U/L DA — SIGNIFICANT CHANGE UP (ref 10–60)
ANION GAP SERPL CALC-SCNC: 2 MMOL/L — LOW (ref 5–17)
AST SERPL-CCNC: 18 U/L — SIGNIFICANT CHANGE UP (ref 10–40)
BASE EXCESS BLDA CALC-SCNC: 16.2 MMOL/L — HIGH (ref -2–3)
BASOPHILS # BLD AUTO: 0.07 K/UL — SIGNIFICANT CHANGE UP (ref 0–0.2)
BASOPHILS NFR BLD AUTO: 0.6 % — SIGNIFICANT CHANGE UP (ref 0–2)
BILIRUB SERPL-MCNC: 0.8 MG/DL — SIGNIFICANT CHANGE UP (ref 0.2–1.2)
BLOOD GAS COMMENTS ARTERIAL: SIGNIFICANT CHANGE UP
BUN SERPL-MCNC: 12 MG/DL — SIGNIFICANT CHANGE UP (ref 7–18)
CALCIUM SERPL-MCNC: 8.6 MG/DL — SIGNIFICANT CHANGE UP (ref 8.4–10.5)
CHLORIDE SERPL-SCNC: 100 MMOL/L — SIGNIFICANT CHANGE UP (ref 96–108)
CO2 SERPL-SCNC: 39 MMOL/L — HIGH (ref 22–31)
CREAT SERPL-MCNC: 0.4 MG/DL — LOW (ref 0.5–1.3)
EOSINOPHIL # BLD AUTO: 0.96 K/UL — HIGH (ref 0–0.5)
EOSINOPHIL NFR BLD AUTO: 7.9 % — HIGH (ref 0–6)
GLUCOSE BLDC GLUCOMTR-MCNC: 134 MG/DL — HIGH (ref 70–99)
GLUCOSE BLDC GLUCOMTR-MCNC: 142 MG/DL — HIGH (ref 70–99)
GLUCOSE BLDC GLUCOMTR-MCNC: 157 MG/DL — HIGH (ref 70–99)
GLUCOSE BLDC GLUCOMTR-MCNC: 164 MG/DL — HIGH (ref 70–99)
GLUCOSE BLDC GLUCOMTR-MCNC: 202 MG/DL — HIGH (ref 70–99)
GLUCOSE SERPL-MCNC: 115 MG/DL — HIGH (ref 70–99)
HCO3 BLDA-SCNC: 43 MMOL/L — HIGH (ref 21–28)
HCT VFR BLD CALC: 30.3 % — LOW (ref 39–50)
HGB BLD-MCNC: 9.2 G/DL — LOW (ref 13–17)
HOROWITZ INDEX BLDA+IHG-RTO: 40 — SIGNIFICANT CHANGE UP
IMM GRANULOCYTES NFR BLD AUTO: 1.5 % — SIGNIFICANT CHANGE UP (ref 0–1.5)
LYMPHOCYTES # BLD AUTO: 1.8 K/UL — SIGNIFICANT CHANGE UP (ref 1–3.3)
LYMPHOCYTES # BLD AUTO: 14.9 % — SIGNIFICANT CHANGE UP (ref 13–44)
MAGNESIUM SERPL-MCNC: 2.1 MG/DL — SIGNIFICANT CHANGE UP (ref 1.6–2.6)
MCHC RBC-ENTMCNC: 30.4 GM/DL — LOW (ref 32–36)
MCHC RBC-ENTMCNC: 32.1 PG — SIGNIFICANT CHANGE UP (ref 27–34)
MCV RBC AUTO: 105.6 FL — HIGH (ref 80–100)
MONOCYTES # BLD AUTO: 0.79 K/UL — SIGNIFICANT CHANGE UP (ref 0–0.9)
MONOCYTES NFR BLD AUTO: 6.5 % — SIGNIFICANT CHANGE UP (ref 2–14)
NEUTROPHILS # BLD AUTO: 8.32 K/UL — HIGH (ref 1.8–7.4)
NEUTROPHILS NFR BLD AUTO: 68.6 % — SIGNIFICANT CHANGE UP (ref 43–77)
NRBC # BLD: 0 /100 WBCS — SIGNIFICANT CHANGE UP (ref 0–0)
PCO2 BLDA: 59 MMHG — HIGH (ref 35–48)
PH BLDA: 7.47 — HIGH (ref 7.35–7.45)
PHOSPHATE SERPL-MCNC: 4.6 MG/DL — HIGH (ref 2.5–4.5)
PLATELET # BLD AUTO: 309 K/UL — SIGNIFICANT CHANGE UP (ref 150–400)
PO2 BLDA: 160 MMHG — HIGH (ref 83–108)
POTASSIUM SERPL-MCNC: 3.6 MMOL/L — SIGNIFICANT CHANGE UP (ref 3.5–5.3)
POTASSIUM SERPL-SCNC: 3.6 MMOL/L — SIGNIFICANT CHANGE UP (ref 3.5–5.3)
PROT SERPL-MCNC: 7 G/DL — SIGNIFICANT CHANGE UP (ref 6–8.3)
RBC # BLD: 2.87 M/UL — LOW (ref 4.2–5.8)
RBC # FLD: 17 % — HIGH (ref 10.3–14.5)
SAO2 % BLDA: 100 % — SIGNIFICANT CHANGE UP
SODIUM SERPL-SCNC: 141 MMOL/L — SIGNIFICANT CHANGE UP (ref 135–145)
WBC # BLD: 12.12 K/UL — HIGH (ref 3.8–10.5)
WBC # FLD AUTO: 12.12 K/UL — HIGH (ref 3.8–10.5)

## 2021-05-10 PROCEDURE — 71045 X-RAY EXAM CHEST 1 VIEW: CPT | Mod: 26

## 2021-05-10 PROCEDURE — 74018 RADEX ABDOMEN 1 VIEW: CPT | Mod: 26

## 2021-05-10 RX ORDER — QUETIAPINE FUMARATE 200 MG/1
50 TABLET, FILM COATED ORAL AT BEDTIME
Refills: 0 | Status: DISCONTINUED | OUTPATIENT
Start: 2021-05-10 | End: 2021-05-11

## 2021-05-10 RX ORDER — PHENYLEPHRINE HYDROCHLORIDE 10 MG/ML
1 INJECTION INTRAVENOUS
Qty: 160 | Refills: 0 | Status: DISCONTINUED | OUTPATIENT
Start: 2021-05-10 | End: 2021-05-13

## 2021-05-10 RX ADMIN — DEXMEDETOMIDINE HYDROCHLORIDE IN 0.9% SODIUM CHLORIDE 4.2 MICROGRAM(S)/KG/HR: 4 INJECTION INTRAVENOUS at 05:48

## 2021-05-10 RX ADMIN — DEXMEDETOMIDINE HYDROCHLORIDE IN 0.9% SODIUM CHLORIDE 4.2 MICROGRAM(S)/KG/HR: 4 INJECTION INTRAVENOUS at 14:27

## 2021-05-10 RX ADMIN — Medication 2 MILLIGRAM(S): at 05:49

## 2021-05-10 RX ADMIN — PHENYLEPHRINE HYDROCHLORIDE 0.32 MICROGRAM(S)/KG/MIN: 10 INJECTION INTRAVENOUS at 03:24

## 2021-05-10 RX ADMIN — Medication 1 TABLET(S): at 11:03

## 2021-05-10 RX ADMIN — DEXMEDETOMIDINE HYDROCHLORIDE IN 0.9% SODIUM CHLORIDE 4.2 MICROGRAM(S)/KG/HR: 4 INJECTION INTRAVENOUS at 15:15

## 2021-05-10 RX ADMIN — ENOXAPARIN SODIUM 40 MILLIGRAM(S): 100 INJECTION SUBCUTANEOUS at 11:03

## 2021-05-10 RX ADMIN — CHLORHEXIDINE GLUCONATE 1 APPLICATION(S): 213 SOLUTION TOPICAL at 05:50

## 2021-05-10 RX ADMIN — Medication 40 MILLIGRAM(S): at 17:17

## 2021-05-10 RX ADMIN — QUETIAPINE FUMARATE 50 MILLIGRAM(S): 200 TABLET, FILM COATED ORAL at 23:18

## 2021-05-10 RX ADMIN — Medication 1: at 23:48

## 2021-05-10 RX ADMIN — Medication 40 MILLIGRAM(S): at 05:49

## 2021-05-10 RX ADMIN — Medication 2 MILLIGRAM(S): at 10:57

## 2021-05-10 RX ADMIN — MIDODRINE HYDROCHLORIDE 10 MILLIGRAM(S): 2.5 TABLET ORAL at 13:34

## 2021-05-10 RX ADMIN — MIDODRINE HYDROCHLORIDE 10 MILLIGRAM(S): 2.5 TABLET ORAL at 21:35

## 2021-05-10 RX ADMIN — METHADONE HYDROCHLORIDE 5 MILLIGRAM(S): 40 TABLET ORAL at 05:49

## 2021-05-10 RX ADMIN — CHLORHEXIDINE GLUCONATE 15 MILLILITER(S): 213 SOLUTION TOPICAL at 17:16

## 2021-05-10 RX ADMIN — DEXMEDETOMIDINE HYDROCHLORIDE IN 0.9% SODIUM CHLORIDE 4.2 MICROGRAM(S)/KG/HR: 4 INJECTION INTRAVENOUS at 03:04

## 2021-05-10 RX ADMIN — Medication 1: at 17:00

## 2021-05-10 RX ADMIN — ACETAZOLAMIDE 105 MILLIGRAM(S): 250 TABLET ORAL at 17:12

## 2021-05-10 RX ADMIN — POLYETHYLENE GLYCOL 3350 17 GRAM(S): 17 POWDER, FOR SOLUTION ORAL at 05:49

## 2021-05-10 RX ADMIN — Medication 40 MILLIGRAM(S): at 05:50

## 2021-05-10 RX ADMIN — Medication 50 MILLILITER(S): at 05:50

## 2021-05-10 RX ADMIN — Medication 1 APPLICATION(S): at 05:49

## 2021-05-10 RX ADMIN — ACETAZOLAMIDE 105 MILLIGRAM(S): 250 TABLET ORAL at 05:49

## 2021-05-10 RX ADMIN — DEXMEDETOMIDINE HYDROCHLORIDE IN 0.9% SODIUM CHLORIDE 4.2 MICROGRAM(S)/KG/HR: 4 INJECTION INTRAVENOUS at 08:49

## 2021-05-10 RX ADMIN — Medication 2 MILLIGRAM(S): at 21:35

## 2021-05-10 RX ADMIN — DEXMEDETOMIDINE HYDROCHLORIDE IN 0.9% SODIUM CHLORIDE 4.2 MICROGRAM(S)/KG/HR: 4 INJECTION INTRAVENOUS at 00:00

## 2021-05-10 RX ADMIN — DEXMEDETOMIDINE HYDROCHLORIDE IN 0.9% SODIUM CHLORIDE 4.2 MICROGRAM(S)/KG/HR: 4 INJECTION INTRAVENOUS at 11:24

## 2021-05-10 RX ADMIN — Medication 2 MILLIGRAM(S): at 21:47

## 2021-05-10 RX ADMIN — Medication 1 APPLICATION(S): at 17:16

## 2021-05-10 RX ADMIN — CHLORHEXIDINE GLUCONATE 15 MILLILITER(S): 213 SOLUTION TOPICAL at 05:51

## 2021-05-10 RX ADMIN — OLANZAPINE 10 MILLIGRAM(S): 15 TABLET, FILM COATED ORAL at 05:49

## 2021-05-10 RX ADMIN — Medication 10 MILLIGRAM(S): at 22:26

## 2021-05-10 RX ADMIN — Medication 2: at 11:01

## 2021-05-10 RX ADMIN — Medication 2 MILLIGRAM(S): at 14:27

## 2021-05-10 RX ADMIN — PANTOPRAZOLE SODIUM 40 MILLIGRAM(S): 20 TABLET, DELAYED RELEASE ORAL at 05:50

## 2021-05-10 RX ADMIN — PANTOPRAZOLE SODIUM 40 MILLIGRAM(S): 20 TABLET, DELAYED RELEASE ORAL at 17:16

## 2021-05-10 RX ADMIN — Medication 2 MILLIGRAM(S): at 05:00

## 2021-05-10 RX ADMIN — Medication 1000 MILLIGRAM(S): at 11:03

## 2021-05-10 RX ADMIN — TAMSULOSIN HYDROCHLORIDE 0.4 MILLIGRAM(S): 0.4 CAPSULE ORAL at 21:35

## 2021-05-10 RX ADMIN — DEXMEDETOMIDINE HYDROCHLORIDE IN 0.9% SODIUM CHLORIDE 4.2 MICROGRAM(S)/KG/HR: 4 INJECTION INTRAVENOUS at 22:26

## 2021-05-10 RX ADMIN — MIDODRINE HYDROCHLORIDE 10 MILLIGRAM(S): 2.5 TABLET ORAL at 05:51

## 2021-05-10 RX ADMIN — POLYETHYLENE GLYCOL 3350 17 GRAM(S): 17 POWDER, FOR SOLUTION ORAL at 17:16

## 2021-05-10 RX ADMIN — Medication 4 MILLIGRAM(S): at 00:18

## 2021-05-10 NOTE — PROGRESS NOTE ADULT - SUBJECTIVE AND OBJECTIVE BOX
INTERVAL HPI/OVERNIGHT EVENTS:  Patient seen and examined in ICU  Sedated  Pressors x 1    MEDICATIONS  (STANDING):  acetaZOLAMIDE  IVPB 250 milliGRAM(s) IV Intermittent every 12 hours  ascorbic acid 1000 milliGRAM(s) Oral daily  BACItracin   Ointment 1 Application(s) Topical two times a day  chlorhexidine 0.12% Liquid 15 milliLiter(s) Oral Mucosa every 12 hours  chlorhexidine 2% Cloths 1 Application(s) Topical <User Schedule>  clonazePAM  Tablet 2 milliGRAM(s) Oral every 8 hours  dexMEDEtomidine Infusion 0.2 MICROgram(s)/kG/Hr (4.2 mL/Hr) IV Continuous <Continuous>  enoxaparin Injectable 40 milliGRAM(s) SubCutaneous every 24 hours  furosemide   Injectable 40 milliGRAM(s) IV Push every 12 hours  insulin lispro (ADMELOG) corrective regimen sliding scale   SubCutaneous every 6 hours  midodrine 10 milliGRAM(s) Oral every 8 hours  pantoprazole  Injectable 40 milliGRAM(s) IV Push every 12 hours  phenylephrine    Infusion 1 MICROgram(s)/kG/Min (15.7 mL/Hr) IV Continuous <Continuous>  polyethylene glycol 3350 17 Gram(s) Oral two times a day  predniSONE   Tablet 40 milliGRAM(s) Oral daily  tamsulosin 0.4 milliGRAM(s) Oral at bedtime  trimethoprim  160 mG/sulfamethoxazole 800 mG 1 Tablet(s) Oral <User Schedule>    MEDICATIONS  (PRN):  acetaminophen    Suspension .. 650 milliGRAM(s) Oral every 12 hours PRN Temp greater or equal to 38C (100.4F)  ALBUTerol    90 MICROgram(s) HFA Inhaler 2 Puff(s) Inhalation every 6 hours PRN Shortness of Breath and/or Wheezing  artificial  tears Solution 1 Drop(s) Both EYES every 4 hours PRN Dry Eyes  LORazepam   Injectable 2 milliGRAM(s) IV Push every 6 hours PRN Agitation  sodium chloride 0.9% lock flush 10 milliLiter(s) IV Push every 1 hour PRN Pre/post blood products, medications, blood draw, and to maintain line patency  sodium chloride 0.9% lock flush 10 milliLiter(s) IV Push every 1 hour PRN Pre/post blood products, medications, blood draw, and to maintain line patency      Vital Signs Last 24 Hrs  T(C): 37.6 (10 May 2021 05:00), Max: 37.6 (10 May 2021 05:00)  T(F): 99.6 (10 May 2021 05:00), Max: 99.6 (10 May 2021 05:00)  HR: 106 (10 May 2021 11:00) (76 - 171)  BP: 117/65 (10 May 2021 11:00) (64/42 - 202/150)  BP(mean): 76 (10 May 2021 11:00) (44 - 163)  RR: 32 (10 May 2021 11:00) (19 - 42)  SpO2: 96% (10 May 2021 11:00) (90% - 100%)    Physical:  General: Sedated  ENT: trached  Chest: respirations unlabored, vented, Left pigtail anterior catheter in place  Abdomen: Soft nondistended, G-tube in place. Mid abdominal wound with wet to dry dressing. No signs of infections, no active drainage or no bleeding.     09 May 2021 07:01  -  10 May 2021 07:00  --------------------------------------------------------  IN:    Albumin 5%  - 250 mL: 150 mL    Dexmedetomidine: 756 mL    Enteral Tube Flush: 220 mL    Free Water: 250 mL    IV PiggyBack: 50 mL    Norepinephrine: 44.3 mL    Osmolite 1.2: 960 mL    Phenylephrine: 47.1 mL    Phenylephrine: 62.8 mL  Total IN: 2540.2 mL    OUT:    Chest Tube (mL): 0 mL    Chest Tube (mL): 0 mL    Indwelling Catheter - Urethral (mL): 4505 mL  Total OUT: 4505 mL    Total NET: -1964.8 mL      10 May 2021 07:01  -  10 May 2021 11:16  --------------------------------------------------------  IN:    Dexmedetomidine: 126 mL    Free Water: 250 mL    Phenylephrine: 62.8 mL  Total IN: 438.8 mL    OUT:    Indwelling Catheter - Urethral (mL): 700 mL  Total OUT: 700 mL    Total NET: -261.2 mL    LABS:                        9.2    12.12 )-----------( 309      ( 10 May 2021 05:49 )             30.3             05-10    141  |  100  |  12  ----------------------------<  115<H>  3.6   |  39<H>  |  0.40<L>    Ca    8.6      10 May 2021 05:49  Phos  4.6     05-10  Mg     2.1     05-10    TPro  7.0  /  Alb  3.2<L>  /  TBili  0.8  /  DBili  x   /  AST  18  /  ALT  21  /  AlkPhos  95  05-10

## 2021-05-10 NOTE — CHART NOTE - NSCHARTNOTEFT_GEN_A_CORE
Updated Pt's brother at bedside  about current clinical course and treatment plan. All questions and concerns were answered and addressed.

## 2021-05-10 NOTE — PROGRESS NOTE ADULT - SUBJECTIVE AND OBJECTIVE BOX
condition same  on vent  not responsive  low grade temp        MEDICATIONS  (STANDING):  acetaZOLAMIDE  IVPB 250 milliGRAM(s) IV Intermittent every 12 hours  ascorbic acid 1000 milliGRAM(s) Oral daily  BACItracin   Ointment 1 Application(s) Topical two times a day  chlorhexidine 0.12% Liquid 15 milliLiter(s) Oral Mucosa every 12 hours  chlorhexidine 2% Cloths 1 Application(s) Topical <User Schedule>  clonazePAM  Tablet 2 milliGRAM(s) Oral every 8 hours  dexMEDEtomidine Infusion 0.2 MICROgram(s)/kG/Hr (4.2 mL/Hr) IV Continuous <Continuous>  enoxaparin Injectable 40 milliGRAM(s) SubCutaneous every 24 hours  furosemide   Injectable 40 milliGRAM(s) IV Push every 12 hours  insulin lispro (ADMELOG) corrective regimen sliding scale   SubCutaneous every 6 hours  midodrine 10 milliGRAM(s) Oral every 8 hours  pantoprazole  Injectable 40 milliGRAM(s) IV Push every 12 hours  phenylephrine    Infusion 1 MICROgram(s)/kG/Min (15.7 mL/Hr) IV Continuous <Continuous>  polyethylene glycol 3350 17 Gram(s) Oral two times a day  predniSONE   Tablet 40 milliGRAM(s) Oral daily  tamsulosin 0.4 milliGRAM(s) Oral at bedtime  trimethoprim  160 mG/sulfamethoxazole 800 mG 1 Tablet(s) Oral <User Schedule>    MEDICATIONS  (PRN):  acetaminophen    Suspension .. 650 milliGRAM(s) Oral every 12 hours PRN Temp greater or equal to 38C (100.4F)  ALBUTerol    90 MICROgram(s) HFA Inhaler 2 Puff(s) Inhalation every 6 hours PRN Shortness of Breath and/or Wheezing  artificial  tears Solution 1 Drop(s) Both EYES every 4 hours PRN Dry Eyes  sodium chloride 0.9% lock flush 10 milliLiter(s) IV Push every 1 hour PRN Pre/post blood products, medications, blood draw, and to maintain line patency  sodium chloride 0.9% lock flush 10 milliLiter(s) IV Push every 1 hour PRN Pre/post blood products, medications, blood draw, and to maintain line patency      Allergies    No Known Allergies    Intolerances        Vital Signs Last 24 Hrs  T(C): 37.6 (10 May 2021 05:00), Max: 37.6 (10 May 2021 05:00)  T(F): 99.6 (10 May 2021 05:00), Max: 99.6 (10 May 2021 05:00)  HR: 81 (10 May 2021 08:45) (76 - 171)  BP: 115/63 (10 May 2021 08:30) (64/42 - 202/150)  BP(mean): 75 (10 May 2021 08:30) (44 - 163)  RR: 25 (10 May 2021 08:45) (19 - 42)  SpO2: 98% (10 May 2021 08:45) (90% - 100%)    PHYSICAL EXAM  General: adult in NAD  HEENT: clear oropharynx, anicteric sclera, pink conjunctiva  Neck: supple  CV: normal S1/S2 with no murmur rubs or gallops  Lungs: positive air movement b/l ant lungs,clear to auscultation, no wheezes, no rales  Abdomen: soft non-tender non-distended, no hepatosplenomegaly  Ext: no clubbing cyanosis or edema  Skin: no rashes and no petechiae  Neuro: alert and oriented X 4, no focal deficits  LABS:                          9.2    12.12 )-----------( 309      ( 10 May 2021 05:49 )             30.3         Mean Cell Volume : 105.6 fl  Mean Cell Hemoglobin : 32.1 pg  Mean Cell Hemoglobin Concentration : 30.4 gm/dL  Auto Neutrophil # : 8.32 K/uL  Auto Lymphocyte # : 1.80 K/uL  Auto Monocyte # : 0.79 K/uL  Auto Eosinophil # : 0.96 K/uL  Auto Basophil # : 0.07 K/uL  Auto Neutrophil % : 68.6 %  Auto Lymphocyte % : 14.9 %  Auto Monocyte % : 6.5 %  Auto Eosinophil % : 7.9 %  Auto Basophil % : 0.6 %    Serial CBC  Hematocrit 30.3  Hemoglobin 9.2  Plat 309  RBC 2.87  WBC 12.12  Serial CBC  Hematocrit 25.5  Hemoglobin 7.9  Plat 342  RBC 2.51  WBC 10.37  Serial CBC  Hematocrit 27.1  Hemoglobin 8.2  Plat 352  RBC 2.65  WBC 10.49  Serial CBC  Hematocrit 24.0  Hemoglobin 8.2  Plat 316  RBC 2.21  WBC 9.42  Serial CBC  Hematocrit 19.1  Hemoglobin 7.0  Plat 254  RBC 1.73  WBC 6.65    05-10    141  |  100  |  12  ----------------------------<  115<H>  3.6   |  39<H>  |  0.40<L>    Ca    8.6      10 May 2021 05:49  Phos  4.6     05-10  Mg     2.1     05-10    TPro  7.0  /  Alb  3.2<L>  /  TBili  0.8  /  DBili  x   /  AST  18  /  ALT  21  /  AlkPhos  95  05-10          Reticulocyte Percent: 2.9 % (05-07 @ 05:09)  Vitamin B12, Serum: 983 pg/mL (05-06 @ 08:49)            BLOOD SMEAR INTERPRETATION:       RADIOLOGY & ADDITIONAL STUDIES:

## 2021-05-10 NOTE — PROGRESS NOTE ADULT - ASSESSMENT
Assessment and plan: 54 y/o M with no PMH is admitted to ICU for AHRF 2/2 covid19 pna     1. Acute hypoxic respiratory failure  2. ARDS 2/2 Covid pneumonia  3. Pneumothorax  4. Prediabetes  5. Abnormal TSH     Neuro  -Alert and oriented x 3 at baseline   -Intubated and sedated 3/29 on Vent  -Trach 4/22 continues on Vent    -Continue Precedex   -Increased Khwnsltpm27 mg BID,  - fentanyl patch was d/c but later was restarted   -Started on Ativan PRN, Not helping much   -Off versed, propofol  -increased dose of klonopin  Will d/c dilaudid, decrease ativan to 2mg Iv q6  -CT head unremarkable     Cardiovascular  # Hypotension   Stable off pressors  Increased dose of midodrine to 10mg tid    # TACHYCARDIA: recurrent episodes   -HR in 110's  - Ativan PRN for agitation  -Continue monitoring      Pulm  #Acute hypoxic respiratory failure: secondary to Covid19 pneumonia  #ARDS  -Was on HFNC then got intubated 3/29  -D-dimer initially elevated on presentation, trending down    -s/p Nimbex and Epifanio at different occasions for vent synchrony  -s/p Pigtail on left chest, removed on 4/15  -Large Pneumothorax was noted on 4/18 on left side, s/p chest tube placement to suction since 4/27, still has pneumothorax-   -Trach 4/22 continues on Vent   -Remdesivir was discontinued due to positive antibodies   - Finished Dexamethasone   - Prednisone taper Finished  - Covid19 PCR negative now, off isolation   - C/w prednisone 40 daily for possible organizing pneumonia   - Started on bactrim empirically, TIW  - Chest tube was kinked, which was fixed by surgery, pneumothorax improved after that  - anterior chest tube was placed 5/6  - CXR shows improvement in PTX; continue to monitor       # Bacterial Pneumonia  - Stable infiltrate on CXR ,likely developed Bacterial pneumonia , Finished ABx Zosyn, meropenem, s/p 1 dose of Vancomycin  - MRSA negative from 3/26  - Sputum Cx growing few gram negative rods, CRE  - Secretions from the trach, needs frequent suctions   - Pulm. Dr. Ryan  - ID Dr Anand       #Pneumothorax and pneumomediastinum:   -X-ray chest: remains with left pneumothorax  -Thoracic surgery following  -s/p Chest tube placed 4/8 pigtail to wall suction discontinued on 4/15  -On 4/18 chest tube was placed back on left lung to treat pneumothorax-  chest tube to suction    -CXR 4/26 still shows left lung pneumothorax - CXR 4/29 persistent pneumothorax     -CXR shows persistent PTX, CT chest shows mild persistent PTX  -anterior chest tube 5/6  CXR shows improvement; continue to monitor       ID  Likely bacterial pneumonia   -leukocytosis resolved  -No more fever, On Tylenol PRN only   -Finished Meropenem  -plan as above     Spiked temp of 101.5 on 5/5  Will send in cultures if spikes again  UA was negative    Nephro  -Pitting edema to both arms, ecchymosis on right AC, blisters on left arm around brachial area    -Was retaining urine, andrea was placed 5/5  -monitor I/Os   -Was started on albumin, will c/w for another 2 days  c/w lasix  Start diamox 250 q12 x2days  Will start on free water through PEG tube to increase hydration  Will remove peripheral IV lines and put ACE wraps to decrease edema  AVOID IV FLUIDS    GI  Transaminitis:   likely secondary to Covid19   - and  on presentation   hepatitis panel -ve  -Improved, now normalized   -continue to monitor LFT    Diet:   S/p NGT (3/29) with tube feed  C/w bowel regimen   G tube 4/23, on tube feeds    BM on 5/7    Heme  Elevated d-dimer: likely secondary to Covid19   -D-dimer 423 on admission  -Last D-dimer 1434  - No active bleeding, restarted lovenox daily    Anemia  On 4/26 Hb 8.8 dropped to 6.8, s/p 1 unit of PRBC repeat hb 7.2  On 4/27 Hb 7, s/p 2 units of PRBC hb remains 7  On 4/28 Hb 6.8, s/p 1 unit of PRBC hb 7.5 now  4 PRBC total    CTH and CT abdomen w/o contrast no evidence of bleed    No active signs of bleeding (No hematemesis, melena or hematuria)  F/u hemolysis w/u- negative so far, elevated reticulocytes   Dr Andrade on board   CBC on 5/3 was 7.7  Will Monitor  F/u CBC daily     Endocrine  Prediabetes:  -A1c 5.8  -BS controlled  -continue HSS    Abnormal TSH:  -TSH level noted 0.26,   -Repeat TSH 0.37 and Free T4 1.82    Skin/Catheters  No rashes. Peripheral IV lines.   Both arms with pitting edema, right AC with ecchymosis and left AC with blisters     Prophylaxis   On Lovenox for DVT   Protonix for GI proph    GOC  FULL CODE Assessment and plan: 56 y/o M with no PMH is admitted to ICU for AHRF 2/2 covid19 pna     1. Acute hypoxic respiratory failure  2. ARDS 2/2 Covid pneumonia  3. Pneumothorax  4. Prediabetes  5. Abnormal TSH     Neuro  -Alert and oriented x 3 at baseline   -Sedated on trach  -Continue Precedex   -DC Methadone, zyprexa  -Continue Klonopin  -CT head unremarkable     Cardiovascular  # Hypotension   On phenylephrine  C/w midodrine to 10mg tid    # TACHYCARDIA: recurrent episodes   -HR in 110's  - Ativan PRN for agitation  -Continue monitoring      Pulm  #Acute hypoxic respiratory failure: secondary to Covid19 pneumonia  #ARDS  -Was on HFNC then got intubated 3/29  -Trach 4/22 continues on Vent   - Remdesivir was discontinued due to positive antibodies   - Finished Dexamethasone   - Covid19 PCR negative now, off isolation     # Bacterial Pneumonia  - Stable infiltrate on CXR ,likely developed Bacterial pneumonia , Finished ABx Zosyn, meropenem, s/p 1 dose of Vancomycin  - MRSA negative from 3/26  - Sputum Cx growing few gram negative rods, CRE - Contact isolation  - C/w prednisone 40 daily for possible organizing pneumonia   - Started on bactrim empirically, TIW for PCP PPX  - Secretions from the trach, needs frequent suctions   - Pulm. Dr. Ryan  - ID Dr Anand       #Pneumothorax and pneumomediastinum:   -Thoracic surgery following  -s/p Chest tube placed 4/8 pigtail to wall suction and d/c on 4/15  -On 4/18 chest tube replaced for recurrence of PTX- c/w chest tube to suction     -anterior chest tube placed 5/6 for worsening pneumothorax with resolution of PTX  5/10 CXR with recurrence of apical pneumo - adjust tube and fu repeat CXR      ID  Likely bacterial pneumonia   -leukocytosis uptrend WBC 12k  -No more fever, On Tylenol PRN only   -Completed ABX course  -plan as above       Nephro  -Pitting edema to both arms, ecchymosis on right AC, blisters on left arm around brachial area    -Was retaining urine, andrea was placed 5/5  -monitor I/Os   -Completed albumin  Metabolic alkalosis improving  c/w lasix 40 IV BID  C/w diamox 250 q12 x2days  C/w free water through PEG tube to increase PO hydration  Will remove peripheral IV lines and put ACE wraps to decrease edema  AVOID IV FLUIDS    GI  Transaminitis:   likely secondary to Covid19, Resolved   hepatitis panel -ve  -continue to monitor LFT    Diet:   C/w bowel regimen   G tube 4/23, on tube feeds      Heme  Elevated d-dimer: likely secondary to Covid19   -D-dimer 423 on admission  -Last D-dimer 1434  - No active bleeding, restarted lovenox daily    Anemia  S/p 4 PRBC total  - Now Hg stable 7-9  CTH and CT abdomen w/o contrast no evidence of bleed    No active signs of bleeding (No hematemesis, melena or hematuria)  Hemolysis w/u- negative  Iron studies show AECD  Dr Andrade on board   Hg stable 7-9  Will Monitor  F/u CBC daily     Endocrine  Prediabetes:  -A1c 5.8  -BS controlled  -continue HSS    Abnormal TSH:  -TSH level noted 0.26,   -Repeat TSH 0.37 and Free T4 1.82    Skin/Catheters  No rashes. Peripheral IV lines.   [facility]  Both arms with pitting edema, right AC with ecchymosis and left AC with blisters     Prophylaxis   On Lovenox for DVT   Protonix for GI proph    GOC  FULL CODE

## 2021-05-10 NOTE — PROGRESS NOTE ADULT - ASSESSMENT
55M s/p open G-tube 4/23 now with abdominal wall seroma  Wound with no signs of infection    -Daily wet to dry dressing change  -Wound care orders in place  -Continue tube feeds via g-tube

## 2021-05-10 NOTE — PROGRESS NOTE ADULT - SUBJECTIVE AND OBJECTIVE BOX
Patient is a 55y old  Male who presents with a chief complaint of SOB (09 May 2021 11:19)    pt seen in icu [ x ], reg med floor [   ], bed [ x ], chair at bedside [   ], awake and responsive [ x ], mildly sedated, [  ],    nad [x  ]      Allergies    No Known Allergies        Vitals    T(F): 99.6 (05-10-21 @ 05:00), Max: 99.6 (05-10-21 @ 05:00)  HR: 94 (05-10-21 @ 06:15) (76 - 171)  BP: 103/53 (05-10-21 @ 06:15) (64/42 - 202/150)  RR: 24 (05-10-21 @ 06:15) (19 - 42)  SpO2: 98% (05-10-21 @ 06:15) (90% - 100%)  Wt(kg): --  CAPILLARY BLOOD GLUCOSE      POCT Blood Glucose.: 134 mg/dL (10 May 2021 04:31)      Labs                          7.9    10.37 )-----------( 342      ( 09 May 2021 06:20 )             25.5       05-10    141  |  100  |  12  ----------------------------<  115<H>  3.6   |  39<H>  |  0.40<L>    Ca    8.6      10 May 2021 05:49  Phos  4.6     05-10  Mg     2.1     05-10    TPro  7.0  /  Alb  3.2<L>  /  TBili  0.8  /  DBili  x   /  AST  18  /  ALT  21  /  AlkPhos  95  05-10            .Sputum Sputum  04-16 @ 04:42   Moderate Enterobacter aerogenes (Carbapenem Resistant)  Normal Respiratory Monserrat present  --  Enterobacter aerogenes (Carbapenem Resistant)      .Urine Clean Catch (Midstream)  03-15 @ 00:52   No growth  --  --          Radiology Results      Meds    MEDICATIONS  (STANDING):  acetaZOLAMIDE  IVPB 250 milliGRAM(s) IV Intermittent every 12 hours  ascorbic acid 1000 milliGRAM(s) Oral daily  BACItracin   Ointment 1 Application(s) Topical two times a day  chlorhexidine 0.12% Liquid 15 milliLiter(s) Oral Mucosa every 12 hours  chlorhexidine 2% Cloths 1 Application(s) Topical <User Schedule>  clonazePAM  Tablet 2 milliGRAM(s) Oral every 8 hours  dexMEDEtomidine Infusion 0.2 MICROgram(s)/kG/Hr (4.2 mL/Hr) IV Continuous <Continuous>  enoxaparin Injectable 40 milliGRAM(s) SubCutaneous every 24 hours  furosemide   Injectable 40 milliGRAM(s) IV Push every 12 hours  insulin lispro (ADMELOG) corrective regimen sliding scale   SubCutaneous every 6 hours  methadone    Tablet 5 milliGRAM(s) Oral every 8 hours  midodrine 10 milliGRAM(s) Oral every 8 hours  OLANZapine 10 milliGRAM(s) Oral every 12 hours  pantoprazole  Injectable 40 milliGRAM(s) IV Push every 12 hours  phenylephrine    Infusion 1 MICROgram(s)/kG/Min (15.7 mL/Hr) IV Continuous <Continuous>  polyethylene glycol 3350 17 Gram(s) Oral two times a day  predniSONE   Tablet 40 milliGRAM(s) Oral daily  tamsulosin 0.4 milliGRAM(s) Oral at bedtime  trimethoprim  160 mG/sulfamethoxazole 800 mG 1 Tablet(s) Oral <User Schedule>      MEDICATIONS  (PRN):  acetaminophen    Suspension .. 650 milliGRAM(s) Oral every 12 hours PRN Temp greater or equal to 38C (100.4F)  ALBUTerol    90 MICROgram(s) HFA Inhaler 2 Puff(s) Inhalation every 6 hours PRN Shortness of Breath and/or Wheezing  artificial  tears Solution 1 Drop(s) Both EYES every 4 hours PRN Dry Eyes  LORazepam   Injectable 2 milliGRAM(s) IV Push every 6 hours PRN Agitation  sodium chloride 0.9% lock flush 10 milliLiter(s) IV Push every 1 hour PRN Pre/post blood products, medications, blood draw, and to maintain line patency  sodium chloride 0.9% lock flush 10 milliLiter(s) IV Push every 1 hour PRN Pre/post blood products, medications, blood draw, and to maintain line patency      Physical Exam    Neuro :  no focal deficits  Respiratory: CTA B/L  CV: RRR, S1S2, no murmurs,   Abdominal: Soft, NT, ND +BS, g- tube in place, dressing cdi  Extremities: b/l ue edema, + peripheral pulses      ASSESSMENT    Hypoxemia 2nd to covid pna   transaminitis  prediabetes  h/o appendectomy  cholecystectomy        PLAN    contact and airborne isolation  d/c remdesevir given covid ab positive noted   completed dexamethasone   started pulse steroids for 3 days - 250mg solumedrol bid now tapered off 4/14/21   C/w prednisone 40 daily for possible organizing pneumonia   Started on bactrim empirically, TIW  cont asa, vit c,    cont albuterol inhaler   pulm f/u  procalcitonin, D-dimer, crp, ldh, ferritin, lactate noted ,    afebrile   cont tylenol prn,   cont robitussin prn   pt on precedex drip    pt on fentanyl patch  off pressors   s/p intubation 3/29/21   s/p tracheostomy 4/21/21  O2 sat 94% (88% - 100%) mech vent 40%  O2 via mech vent and taper fio2 as tolerated   vent mgmt as per icu  xray 3/19/21 with pneumomediastinum  rept cxr with New trace right apical pneumothorax. New mild left apical pneumothorax. Grossly stable small pneumomediastinum.  Soft tissue emphysema at the neck bases bilaterally. Grossly stable bilateral pulmonary infiltrates noted.   cxr 2/24 with No evidence of pneumothorax can be appreciated on the available image. This may be related to patient positioning. Evidence of pneumomediastinum and subcutaneous emphysema in the lower neck is again noted. There are patchy bibasilar infiltrates and elevated right hemidiaphragm noted.   cxr 3/29/21 with No significant change bilateral infiltrates. There is a small simple left apical pneumothorax. No significant pleural effusion. Bilateral subcutaneous emphysema similar to prior.   XRs on 7AM and 5PM with no obvious increase of ptx  cxr 4/4/21 with Improving bilateral airspace disease noted    cxr 4/8/21 with Small left pneumothorax noted.  thoracic surg f/u   s/p L chest tube replacement 4/18/21 for recurrence of left pneumothorax   cxr 4/20/21 with Unchanged advanced infiltrates and catheter left chest tube noted   CXR 4/11/21 with : No interval change compared to one day prior. No pneumothorax noted.   unable to flush or aspirate tube fully, noted debris in tubing which was milked out.   Once material removed from tubing able to aspirated and flush fully. Also changed dressing on pigtail which appears to be in good position.   Monitor O2 status   s/p tracheostomy 4/21/21   stay sutures out 2 weeks from OR date  cxr 4/21/21 with No evidence of active chest disease. Tracheostomy tube in place otherwise no significant change noted   cxr 4/25/21 with A 40% LEFT pneumothorax. LEFT multi-sidehole pigtail catheter overlies LEFT lower hemithorax.. Bilateral multifocal and diffuse ill-defined airspace opacities..  Follow-up AP portable chest radiograph 4/25/2021 AT 8:58 AM: Residual LEFT 30% upper zone pneumothorax. Otherwise no interval change.noted    cxr 4/30/21 Tracheostomy tube again noted. Left chest tube noted with small left apical pneumothorax unchanged. Bilateral airspace opacities overall worsening. Heart size cannot be accurately assessed in this projection noted.   cxr 5/3/21 with Large left pneumothorax noted above.   cxr 5 4/21 with No significant interval change noted above.   ct chest with Extensive chronic appearing consolidative opacities throughout the lungs, most prominent in the left lower lobe and lingula, with bronchiectasis, scarring, and volume loss. Additional scattered peripheral coarse opacities.   Mild left pneumothorax. Left lateral pleural space pigtail catheter, not in continuity with the pneumothorax. Mild bilateral hydronephrosis, similar to appearance on CT of the abdomen and pelvis on April 27. noted above   s/p 2nd chest tube 5/5/21  cxr 5/6/21 with Tracheostomy and catheter left chest tubes remain. , There are significant diffuse advanced infiltrates again noted. No pneumothorax. Above findings are similar to study earlier in the day. Present film shows a left jugular line inserted   with tip entering the superior vena cava noted above.  s/p surgical placement of gastrostomy tube 4/23/2021.   cont on tube feeding  id f/u   No need for further antibiotics as patient completed course of Meropenem  leukocytosis resolved  speutum cx with Enterobacter aerogenes (Carbapenem Resistant) noted above  andrea   lispro ss   prognosis poor   s/p 4 units prbc for anemia  f/u h/h    transfuse prbc as needed  check vitamin b12  heme onc f/u  retic is elevated.    Haptoglobin normal  ? daily phlebotomy  no hemolysis, Fe/B12/folate adequate  hemolysis is unlikely even his baseline haptoglobin may be very elevated due to COVID  direct pamella neg noted above   COVID is known to cause cold agglutinin  cont current meds  mgmt as per icu          Patient is a 55y old  Male who presents with a chief complaint of SOB (09 May 2021 11:19)    pt seen in icu [ x ], reg med floor [   ], bed [ x ], chair at bedside [   ], awake and responsive [  ], mildly sedated, [x  ],    nad [x  ]      Allergies    No Known Allergies        Vitals    T(F): 99.6 (05-10-21 @ 05:00), Max: 99.6 (05-10-21 @ 05:00)  HR: 94 (05-10-21 @ 06:15) (76 - 171)  BP: 103/53 (05-10-21 @ 06:15) (64/42 - 202/150)  RR: 24 (05-10-21 @ 06:15) (19 - 42)  SpO2: 98% (05-10-21 @ 06:15) (90% - 100%)  Wt(kg): --  CAPILLARY BLOOD GLUCOSE      POCT Blood Glucose.: 134 mg/dL (10 May 2021 04:31)      Labs                          7.9    10.37 )-----------( 342      ( 09 May 2021 06:20 )             25.5       05-10    141  |  100  |  12  ----------------------------<  115<H>  3.6   |  39<H>  |  0.40<L>    Ca    8.6      10 May 2021 05:49  Phos  4.6     05-10  Mg     2.1     05-10    TPro  7.0  /  Alb  3.2<L>  /  TBili  0.8  /  DBili  x   /  AST  18  /  ALT  21  /  AlkPhos  95  05-10            .Sputum Sputum  04-16 @ 04:42   Moderate Enterobacter aerogenes (Carbapenem Resistant)  Normal Respiratory Monserrat present  --  Enterobacter aerogenes (Carbapenem Resistant)      .Urine Clean Catch (Midstream)  03-15 @ 00:52   No growth  --  --          Radiology Results      Meds    MEDICATIONS  (STANDING):  acetaZOLAMIDE  IVPB 250 milliGRAM(s) IV Intermittent every 12 hours  ascorbic acid 1000 milliGRAM(s) Oral daily  BACItracin   Ointment 1 Application(s) Topical two times a day  chlorhexidine 0.12% Liquid 15 milliLiter(s) Oral Mucosa every 12 hours  chlorhexidine 2% Cloths 1 Application(s) Topical <User Schedule>  clonazePAM  Tablet 2 milliGRAM(s) Oral every 8 hours  dexMEDEtomidine Infusion 0.2 MICROgram(s)/kG/Hr (4.2 mL/Hr) IV Continuous <Continuous>  enoxaparin Injectable 40 milliGRAM(s) SubCutaneous every 24 hours  furosemide   Injectable 40 milliGRAM(s) IV Push every 12 hours  insulin lispro (ADMELOG) corrective regimen sliding scale   SubCutaneous every 6 hours  methadone    Tablet 5 milliGRAM(s) Oral every 8 hours  midodrine 10 milliGRAM(s) Oral every 8 hours  OLANZapine 10 milliGRAM(s) Oral every 12 hours  pantoprazole  Injectable 40 milliGRAM(s) IV Push every 12 hours  phenylephrine    Infusion 1 MICROgram(s)/kG/Min (15.7 mL/Hr) IV Continuous <Continuous>  polyethylene glycol 3350 17 Gram(s) Oral two times a day  predniSONE   Tablet 40 milliGRAM(s) Oral daily  tamsulosin 0.4 milliGRAM(s) Oral at bedtime  trimethoprim  160 mG/sulfamethoxazole 800 mG 1 Tablet(s) Oral <User Schedule>      MEDICATIONS  (PRN):  acetaminophen    Suspension .. 650 milliGRAM(s) Oral every 12 hours PRN Temp greater or equal to 38C (100.4F)  ALBUTerol    90 MICROgram(s) HFA Inhaler 2 Puff(s) Inhalation every 6 hours PRN Shortness of Breath and/or Wheezing  artificial  tears Solution 1 Drop(s) Both EYES every 4 hours PRN Dry Eyes  LORazepam   Injectable 2 milliGRAM(s) IV Push every 6 hours PRN Agitation  sodium chloride 0.9% lock flush 10 milliLiter(s) IV Push every 1 hour PRN Pre/post blood products, medications, blood draw, and to maintain line patency  sodium chloride 0.9% lock flush 10 milliLiter(s) IV Push every 1 hour PRN Pre/post blood products, medications, blood draw, and to maintain line patency      Physical Exam    Neuro :  no focal deficits  Respiratory: CTA B/L  CV: RRR, S1S2, no murmurs,   Abdominal: Soft, NT, ND +BS, g- tube in place, dressing cdi  Extremities: b/l ue edema, + peripheral pulses      ASSESSMENT    Hypoxemia 2nd to covid pna   transaminitis  prediabetes  h/o appendectomy  cholecystectomy        PLAN    contact and airborne isolation  d/c remdesevir given covid ab positive noted   completed dexamethasone   started pulse steroids for 3 days - 250mg solumedrol bid now tapered off 4/14/21   C/w prednisone 40 daily for possible organizing pneumonia   Started on bactrim empirically, TIW  cont asa, vit c,    cont albuterol inhaler   pulm f/u  procalcitonin, D-dimer, crp, ldh, ferritin, lactate noted ,    afebrile   cont tylenol prn,   cont robitussin prn   pt on precedex drip    pt on fentanyl patch  off pressors   s/p intubation 3/29/21   s/p tracheostomy 4/21/21  O2 sat 98% (90% - 100%) on mech vent  O2 via mech vent and taper fio2 as tolerated   vent mgmt as per icu  xray 3/19/21 with pneumomediastinum  rept cxr with New trace right apical pneumothorax. New mild left apical pneumothorax. Grossly stable small pneumomediastinum.  Soft tissue emphysema at the neck bases bilaterally. Grossly stable bilateral pulmonary infiltrates noted.   cxr 2/24 with No evidence of pneumothorax can be appreciated on the available image. This may be related to patient positioning. Evidence of pneumomediastinum and subcutaneous emphysema in the lower neck is again noted. There are patchy bibasilar infiltrates and elevated right hemidiaphragm noted.   cxr 3/29/21 with No significant change bilateral infiltrates. There is a small simple left apical pneumothorax. No significant pleural effusion. Bilateral subcutaneous emphysema similar to prior.   XRs on 7AM and 5PM with no obvious increase of ptx  cxr 4/4/21 with Improving bilateral airspace disease noted    cxr 4/8/21 with Small left pneumothorax noted.  thoracic surg f/u   s/p L chest tube replacement 4/18/21 for recurrence of left pneumothorax   cxr 4/20/21 with Unchanged advanced infiltrates and catheter left chest tube noted   CXR 4/11/21 with : No interval change compared to one day prior. No pneumothorax noted.   unable to flush or aspirate tube fully, noted debris in tubing which was milked out.   Once material removed from tubing able to aspirated and flush fully. Also changed dressing on pigtail which appears to be in good position.   Monitor O2 status   s/p tracheostomy 4/21/21   stay sutures out 2 weeks from OR date  cxr 4/21/21 with No evidence of active chest disease. Tracheostomy tube in place otherwise no significant change noted   cxr 4/25/21 with A 40% LEFT pneumothorax. LEFT multi-sidehole pigtail catheter overlies LEFT lower hemithorax.. Bilateral multifocal and diffuse ill-defined airspace opacities..  Follow-up AP portable chest radiograph 4/25/2021 AT 8:58 AM: Residual LEFT 30% upper zone pneumothorax. Otherwise no interval change.noted    cxr 4/30/21 Tracheostomy tube again noted. Left chest tube noted with small left apical pneumothorax unchanged. Bilateral airspace opacities overall worsening. Heart size cannot be accurately assessed in this projection noted.   cxr 5/3/21 with Large left pneumothorax noted above.   cxr 5 4/21 with No significant interval change noted above.   ct chest with Extensive chronic appearing consolidative opacities throughout the lungs, most prominent in the left lower lobe and lingula, with bronchiectasis, scarring, and volume loss. Additional scattered peripheral coarse opacities.   Mild left pneumothorax. Left lateral pleural space pigtail catheter, not in continuity with the pneumothorax. Mild bilateral hydronephrosis, similar to appearance on CT of the abdomen and pelvis on April 27. noted above   s/p 2nd chest tube 5/5/21  cxr 5/6/21 with Tracheostomy and catheter left chest tubes remain. , There are significant diffuse advanced infiltrates again noted. No pneumothorax. Above findings are similar to study earlier in the day. Present film shows a left jugular line inserted   with tip entering the superior vena cava noted above.  s/p surgical placement of gastrostomy tube 4/23/2021.   cont on tube feeding  id f/u   No need for further antibiotics as patient completed course of Meropenem  leukocytosis resolved  speutum cx with Enterobacter aerogenes (Carbapenem Resistant) noted above  andrea   lispro ss   prognosis poor   s/p 4 units prbc for anemia  f/u h/h    transfuse prbc as needed  check vitamin b12  heme onc f/u  retic is elevated.    Haptoglobin normal  ? daily phlebotomy  no hemolysis, Fe/B12/folate adequate  hemolysis is unlikely even his baseline haptoglobin may be very elevated due to COVID  direct pamella neg noted    COVID is known to cause cold agglutinin  cont current meds  mgmt as per icu

## 2021-05-10 NOTE — PROGRESS NOTE ADULT - ATTENDING COMMENTS
55 yr old  man , non smoker with  moody 1990s presented 3/14 with x9 days worsening cough, subjective fevers, and SOB, with x2-3 days dysuria and central, non-radiating, constant CP. Admitted to medicine unit  for acute hypoxic respiratory failure secondary to pna from covid-19 infection .     Assessment:  1. Acute hypoxic respiratory failure  2. Covid-19 infection   3. Transaminitis  4. Prediabetes  5. Bilateral pneumothorax  6. Septic shock - resolved  7. Anemia    Plan   -Left apical chest tube manipulated, as patient with recurrence of left apical pneumothorax  - Repeat CXR  -Transfuse to hgb > 7  -S/p tracheostomy placement 4/21   -S/p G-tube 4/23  -Continue tube feedings  - Concern for possible ileus developing, f/u abdominal xray  - Hold all narcotics  -Cont. mechanical ventilation   -Daily weaning trials  -Chest tube to suction  -Cont. prednisone 40 mg for possible organizing pneumonia as evidence on CT scan  -Albumin and Lasix for diuresis, aim for negative fluid balance  -Start vasopressors if drops BP, cont. Midodrine  -Remains anxious when off Precedex, will stop all oral medications except for clonazepam and monitor response  -PT evaluation  -dvt/gi prophy  -hemodynamic monitoring   -Prognosis is guarded

## 2021-05-10 NOTE — PROGRESS NOTE ADULT - SUBJECTIVE AND OBJECTIVE BOX
Patient is a 55y old  Male who presents with a chief complaint of SOB (10 May 2021 11:16)  Patient is sedated, laying in bed in NAD. Still on ventilator via trach. Moderate amount of secretions via trach    INTERVAL HPI/OVERNIGHT EVENTS:      VITAL SIGNS:  T(F): 99.6 (05-10-21 @ 05:00)  HR: 88 (05-10-21 @ 11:26)  BP: 117/65 (05-10-21 @ 11:00)  RR: 32 (05-10-21 @ 11:00)  SpO2: 97% (05-10-21 @ 11:26)  Wt(kg): --  I&O's Detail    09 May 2021 07:01  -  10 May 2021 07:00  --------------------------------------------------------  IN:    Albumin 5%  - 250 mL: 150 mL    Dexmedetomidine: 756 mL    Enteral Tube Flush: 220 mL    Free Water: 250 mL    IV PiggyBack: 50 mL    Norepinephrine: 44.3 mL    Osmolite 1.2: 960 mL    Phenylephrine: 47.1 mL    Phenylephrine: 62.8 mL  Total IN: 2540.2 mL    OUT:    Chest Tube (mL): 0 mL    Chest Tube (mL): 0 mL    Indwelling Catheter - Urethral (mL): 4505 mL  Total OUT: 4505 mL    Total NET: -1964.8 mL      10 May 2021 07:01  -  10 May 2021 11:30  --------------------------------------------------------  IN:    Dexmedetomidine: 126 mL    Free Water: 250 mL    Phenylephrine: 62.8 mL  Total IN: 438.8 mL    OUT:    Indwelling Catheter - Urethral (mL): 700 mL  Total OUT: 700 mL    Total NET: -261.2 mL        Mode: AC/ CMV (Assist Control/ Continuous Mandatory Ventilation)  RR (machine): 14  TV (machine): 400  FiO2: 40  PEEP: 5  ITime: 1  MAP: 11  PIP: 38        REVIEW OF SYSTEMS:    CONSTITUTIONAL:  No fevers, chills, sweats    HEENT:  Eyes:  No diplopia or blurred vision. ENT:  No earache, sore throat or runny nose.    CARDIOVASCULAR:  No pressure, squeezing, tightness, or heaviness about the chest; no palpitations.    RESPIRATORY:  Per HPI    GASTROINTESTINAL:  No abdominal pain, nausea, vomiting or diarrhea.    GENITOURINARY:  No dysuria, frequency or urgency.    NEUROLOGIC:  No paresthesias, fasciculations, seizures or weakness.    PSYCHIATRIC:  No disorder of thought or mood.      PHYSICAL EXAM:    Constitutional: Well developed and nourished  Eyes:Perrla  ENMT: normal  Neck:supple  Respiratory: good air entry  Cardiovascular: S1 S2 regular  Gastrointestinal: Soft, Non tender  Extremities: + edema  Vascular:normal  Neurological:Awake, alert,Ox3  Musculoskeletal:Normal      MEDICATIONS  (STANDING):  acetaZOLAMIDE  IVPB 250 milliGRAM(s) IV Intermittent every 12 hours  ascorbic acid 1000 milliGRAM(s) Oral daily  BACItracin   Ointment 1 Application(s) Topical two times a day  chlorhexidine 0.12% Liquid 15 milliLiter(s) Oral Mucosa every 12 hours  chlorhexidine 2% Cloths 1 Application(s) Topical <User Schedule>  clonazePAM  Tablet 2 milliGRAM(s) Oral every 8 hours  dexMEDEtomidine Infusion 0.2 MICROgram(s)/kG/Hr (4.2 mL/Hr) IV Continuous <Continuous>  enoxaparin Injectable 40 milliGRAM(s) SubCutaneous every 24 hours  furosemide   Injectable 40 milliGRAM(s) IV Push every 12 hours  insulin lispro (ADMELOG) corrective regimen sliding scale   SubCutaneous every 6 hours  midodrine 10 milliGRAM(s) Oral every 8 hours  pantoprazole  Injectable 40 milliGRAM(s) IV Push every 12 hours  phenylephrine    Infusion 1 MICROgram(s)/kG/Min (15.7 mL/Hr) IV Continuous <Continuous>  polyethylene glycol 3350 17 Gram(s) Oral two times a day  predniSONE   Tablet 40 milliGRAM(s) Oral daily  tamsulosin 0.4 milliGRAM(s) Oral at bedtime  trimethoprim  160 mG/sulfamethoxazole 800 mG 1 Tablet(s) Oral <User Schedule>    MEDICATIONS  (PRN):  acetaminophen    Suspension .. 650 milliGRAM(s) Oral every 12 hours PRN Temp greater or equal to 38C (100.4F)  ALBUTerol    90 MICROgram(s) HFA Inhaler 2 Puff(s) Inhalation every 6 hours PRN Shortness of Breath and/or Wheezing  artificial  tears Solution 1 Drop(s) Both EYES every 4 hours PRN Dry Eyes  LORazepam   Injectable 2 milliGRAM(s) IV Push every 6 hours PRN Agitation  sodium chloride 0.9% lock flush 10 milliLiter(s) IV Push every 1 hour PRN Pre/post blood products, medications, blood draw, and to maintain line patency  sodium chloride 0.9% lock flush 10 milliLiter(s) IV Push every 1 hour PRN Pre/post blood products, medications, blood draw, and to maintain line patency      Allergies    No Known Allergies    Intolerances        LABS:                        9.2    12.12 )-----------( 309      ( 10 May 2021 05:49 )             30.3     05-10    141  |  100  |  12  ----------------------------<  115<H>  3.6   |  39<H>  |  0.40<L>    Ca    8.6      10 May 2021 05:49  Phos  4.6     05-10  Mg     2.1     05-10    TPro  7.0  /  Alb  3.2<L>  /  TBili  0.8  /  DBili  x   /  AST  18  /  ALT  21  /  AlkPhos  95  05-10        ABG - ( 10 May 2021 04:21 )  pH, Arterial: 7.47  pH, Blood: x     /  pCO2: 59    /  pO2: 160   / HCO3: 43    / Base Excess: 16.2  /  SaO2: 100                   CAPILLARY BLOOD GLUCOSE      POCT Blood Glucose.: 202 mg/dL (10 May 2021 10:59)  POCT Blood Glucose.: 134 mg/dL (10 May 2021 04:31)  POCT Blood Glucose.: 142 mg/dL (10 May 2021 00:22)  POCT Blood Glucose.: 155 mg/dL (09 May 2021 16:37)  POCT Blood Glucose.: 152 mg/dL (09 May 2021 12:37)    pro-bnp -- 05-04 @ 03:49     d-dimer 2819  05-04 @ 03:49      RADIOLOGY & ADDITIONAL TESTS:    CXR:    < from: Xray Chest 1 View- PORTABLE-Routine (Xray Chest 1 View- PORTABLE-Routine in AM.) (05.08.21 @ 11:12) >  FINDINGS:  Residual LEFT apical a 5% pneumothorax.  LEFT basilar pigtail chest tube catheter overlies upper zone.  LEFT IJ catheter tip in SVC.    The lungsshow continued clearing of RIGHT upper lobe, LEFT upper lobe and LEFT of retrocardiac diffuse airspace disease.  . No pneumothorax.    The heart and mediastinum are within normal limits.    Visualized osseous structures are intact.      IMPRESSION:No interval change.      < end of copied text >  Ct scan chest:    ekg;    echo:

## 2021-05-10 NOTE — PROGRESS NOTE ADULT - SUBJECTIVE AND OBJECTIVE BOX
INTERVAL HPI/OVERNIGHT EVENTS: Pt tachycardic overnight and hypertensive to 228/116 w/p 1x lopressor. Stopped levophed. Pt became hypotensive. Then started phenylephrine.     PRESSORS: [x] YES [] NO     WHICH:pheny    Antimicrobial:  trimethoprim  160 mG/sulfamethoxazole 800 mG 1 Tablet(s) Oral <User Schedule>    Cardiovascular:  acetaZOLAMIDE  IVPB 250 milliGRAM(s) IV Intermittent every 12 hours  furosemide   Injectable 40 milliGRAM(s) IV Push every 12 hours  midodrine 10 milliGRAM(s) Oral every 8 hours  phenylephrine    Infusion 1 MICROgram(s)/kG/Min IV Continuous <Continuous>  tamsulosin 0.4 milliGRAM(s) Oral at bedtime    Pulmonary:  ALBUTerol    90 MICROgram(s) HFA Inhaler 2 Puff(s) Inhalation every 6 hours PRN    Hematalogic:  enoxaparin Injectable 40 milliGRAM(s) SubCutaneous every 24 hours    Other:  acetaminophen    Suspension .. 650 milliGRAM(s) Oral every 12 hours PRN  artificial  tears Solution 1 Drop(s) Both EYES every 4 hours PRN  ascorbic acid 1000 milliGRAM(s) Oral daily  BACItracin   Ointment 1 Application(s) Topical two times a day  chlorhexidine 0.12% Liquid 15 milliLiter(s) Oral Mucosa every 12 hours  chlorhexidine 2% Cloths 1 Application(s) Topical <User Schedule>  clonazePAM  Tablet 2 milliGRAM(s) Oral every 8 hours  dexMEDEtomidine Infusion 0.2 MICROgram(s)/kG/Hr IV Continuous <Continuous>  insulin lispro (ADMELOG) corrective regimen sliding scale   SubCutaneous every 6 hours  LORazepam   Injectable 2 milliGRAM(s) IV Push every 6 hours PRN  methadone    Tablet 5 milliGRAM(s) Oral every 8 hours  OLANZapine 10 milliGRAM(s) Oral every 12 hours  pantoprazole  Injectable 40 milliGRAM(s) IV Push every 12 hours  polyethylene glycol 3350 17 Gram(s) Oral two times a day  predniSONE   Tablet 40 milliGRAM(s) Oral daily  sodium chloride 0.9% lock flush 10 milliLiter(s) IV Push every 1 hour PRN  sodium chloride 0.9% lock flush 10 milliLiter(s) IV Push every 1 hour PRN    acetaminophen    Suspension .. 650 milliGRAM(s) Oral every 12 hours PRN  acetaZOLAMIDE  IVPB 250 milliGRAM(s) IV Intermittent every 12 hours  ALBUTerol    90 MICROgram(s) HFA Inhaler 2 Puff(s) Inhalation every 6 hours PRN  artificial  tears Solution 1 Drop(s) Both EYES every 4 hours PRN  ascorbic acid 1000 milliGRAM(s) Oral daily  BACItracin   Ointment 1 Application(s) Topical two times a day  chlorhexidine 0.12% Liquid 15 milliLiter(s) Oral Mucosa every 12 hours  chlorhexidine 2% Cloths 1 Application(s) Topical <User Schedule>  clonazePAM  Tablet 2 milliGRAM(s) Oral every 8 hours  dexMEDEtomidine Infusion 0.2 MICROgram(s)/kG/Hr IV Continuous <Continuous>  enoxaparin Injectable 40 milliGRAM(s) SubCutaneous every 24 hours  furosemide   Injectable 40 milliGRAM(s) IV Push every 12 hours  insulin lispro (ADMELOG) corrective regimen sliding scale   SubCutaneous every 6 hours  LORazepam   Injectable 2 milliGRAM(s) IV Push every 6 hours PRN  methadone    Tablet 5 milliGRAM(s) Oral every 8 hours  midodrine 10 milliGRAM(s) Oral every 8 hours  OLANZapine 10 milliGRAM(s) Oral every 12 hours  pantoprazole  Injectable 40 milliGRAM(s) IV Push every 12 hours  phenylephrine    Infusion 1 MICROgram(s)/kG/Min IV Continuous <Continuous>  polyethylene glycol 3350 17 Gram(s) Oral two times a day  predniSONE   Tablet 40 milliGRAM(s) Oral daily  sodium chloride 0.9% lock flush 10 milliLiter(s) IV Push every 1 hour PRN  sodium chloride 0.9% lock flush 10 milliLiter(s) IV Push every 1 hour PRN  tamsulosin 0.4 milliGRAM(s) Oral at bedtime  trimethoprim  160 mG/sulfamethoxazole 800 mG 1 Tablet(s) Oral <User Schedule>    Drug Dosing Weight  Height (cm): 167.6 (2021 23:15)  Weight (kg): 83.9 (2021 23:15)  BMI (kg/m2): 29.9 (2021 23:15)  BSA (m2): 1.93 (2021 23:15)    CENTRAL LINE: [x] YES [] NO  LOCATION:   [facility] DATE INSERTED:  REMOVE: [] YES [] NO  EXPLAIN:    RIVERA: [x] YES [] NO    DATE INSERTED:   REMOVE:  [] YES [] NO  EXPLAIN: Strict IOs    A-LINE:  [] YES [x] NO  LOCATION:   DATE INSERTED:  REMOVE:  [] YES [] NO  EXPLAIN:    PMH -reviewed admission note, no change since admission  PAST MEDICAL & SURGICAL HISTORY:  No pertinent past medical history    S/P moody      S/P appendectomy  1990s        ICU Vital Signs Last 24 Hrs  T(C): 37.6 (10 May 2021 05:00), Max: 37.6 (10 May 2021 05:00)  T(F): 99.6 (10 May 2021 05:00), Max: 99.6 (10 May 2021 05:00)  HR: 91 (10 May 2021 07:00) (76 - 171)  BP: 98/60 (10 May 2021 07:00) (64/42 - 202/150)  BP(mean): 67 (10 May 2021 07:00) (44 - 163)  ABP: --  ABP(mean): --  RR: 19 (10 May 2021 07:00) (19 - 42)  SpO2: 99% (10 May 2021 07:00) (90% - 100%)      ABG - ( 10 May 2021 04:21 )  pH, Arterial: 7.47  pH, Blood: x     /  pCO2: 59    /  pO2: 160   / HCO3: 43    / Base Excess: 16.2  /  SaO2: 100                   05- @ 07:01  -  05-10 @ 07:00  --------------------------------------------------------  IN: 2540.2 mL / OUT: 4505 mL / NET: -1964.8 mL        Mode: AC/ CMV (Assist Control/ Continuous Mandatory Ventilation)  RR (machine): 20  TV (machine): 400  FiO2: 40  PEEP: 5  ITime: 1  MAP: 12  PIP: 33      REVIEW OF SYSTEMS:    Unable to complete - patient trach    PHYSICAL EXAM:    GENERAL: NAD, intermittently restless  HEAD:  Atraumatic, Normocephalic  EYES: EOMI, PERRL, conjunctiva and sclera clear  NECK: Supple, normal appearance, No JVD; Normal thyroid; Trachea midline  NERVOUS SYSTEM:  Awake and alert, Moving all 4 extremities  CHEST/LUNx chest tube on L. No chest deformity; Lungs clear to auscultation b/l. No rales, rhonchi, wheezing   HEART: Tachycardic Regular rhythm; No murmurs, rubs, or gallops  ABDOMEN: PEG in place. Soft, Nontender, Nondistended; Bowel sounds present  EXTREMITIES:  Mild dependent edema Peripheral Pulses intact, No clubbing, cyanosis, or   SKIN: Intact, No rashes or lesions    LABS:  CBC Full  -  ( 09 May 2021 06:20 )  WBC Count : 10.37 K/uL  RBC Count : 2.51 M/uL  Hemoglobin : 7.9 g/dL  Hematocrit : 25.5 %  Platelet Count - Automated : 342 K/uL  Mean Cell Volume : 101.6 fl  Mean Cell Hemoglobin : 31.5 pg  Mean Cell Hemoglobin Concentration : 31.0 gm/dL  Auto Neutrophil # : 7.89 K/uL  Auto Lymphocyte # : 1.53 K/uL  Auto Monocyte # : 0.80 K/uL  Auto Eosinophil # : 0.04 K/uL  Auto Basophil # : 0.03 K/uL  Auto Neutrophil % : 76.0 %  Auto Lymphocyte % : 14.8 %  Auto Monocyte % : 7.7 %  Auto Eosinophil % : 0.4 %  Auto Basophil % : 0.3 %    05-10    141  |  100  |  12  ----------------------------<  115<H>  3.6   |  39<H>  |  0.40<L>    Ca    8.6      10 May 2021 05:49  Phos  4.6     05-10  Mg     2.1     -10    TPro  7.0  /  Alb  3.2<L>  /  TBili  0.8  /  DBili  x   /  AST  18  /  ALT  21  /  AlkPhos  95  05-10            RADIOLOGY & ADDITIONAL STUDIES REVIEWED:  ***      GOALS OF CARE DISCUSSION WITH PATIENT/FAMILY/PROXY:     INTERVAL HPI/OVERNIGHT EVENTS: Pt tachycardic overnight and hypertensive to 228/116 w/p 1x lopressor. Stopped levophed. Pt became hypotensive. Then started phenylephrine. BP now stable. Will d/c methadone and seroquel and continue on precedex for sedation. CXR showing apical pneumothorax - chest tube adjusted. Will repeat CXR. Also with distended stomach - fu abd x ray.SBT today. RR decreased to 14.     PRESSORS: [x] YES [] NO     WHICH:pheny    Antimicrobial:  trimethoprim  160 mG/sulfamethoxazole 800 mG 1 Tablet(s) Oral <User Schedule>    Cardiovascular:  acetaZOLAMIDE  IVPB 250 milliGRAM(s) IV Intermittent every 12 hours  furosemide   Injectable 40 milliGRAM(s) IV Push every 12 hours  midodrine 10 milliGRAM(s) Oral every 8 hours  phenylephrine    Infusion 1 MICROgram(s)/kG/Min IV Continuous <Continuous>  tamsulosin 0.4 milliGRAM(s) Oral at bedtime    Pulmonary:  ALBUTerol    90 MICROgram(s) HFA Inhaler 2 Puff(s) Inhalation every 6 hours PRN    Hematalogic:  enoxaparin Injectable 40 milliGRAM(s) SubCutaneous every 24 hours    Other:  acetaminophen    Suspension .. 650 milliGRAM(s) Oral every 12 hours PRN  artificial  tears Solution 1 Drop(s) Both EYES every 4 hours PRN  ascorbic acid 1000 milliGRAM(s) Oral daily  BACItracin   Ointment 1 Application(s) Topical two times a day  chlorhexidine 0.12% Liquid 15 milliLiter(s) Oral Mucosa every 12 hours  chlorhexidine 2% Cloths 1 Application(s) Topical <User Schedule>  clonazePAM  Tablet 2 milliGRAM(s) Oral every 8 hours  dexMEDEtomidine Infusion 0.2 MICROgram(s)/kG/Hr IV Continuous <Continuous>  insulin lispro (ADMELOG) corrective regimen sliding scale   SubCutaneous every 6 hours  LORazepam   Injectable 2 milliGRAM(s) IV Push every 6 hours PRN  methadone    Tablet 5 milliGRAM(s) Oral every 8 hours  OLANZapine 10 milliGRAM(s) Oral every 12 hours  pantoprazole  Injectable 40 milliGRAM(s) IV Push every 12 hours  polyethylene glycol 3350 17 Gram(s) Oral two times a day  predniSONE   Tablet 40 milliGRAM(s) Oral daily  sodium chloride 0.9% lock flush 10 milliLiter(s) IV Push every 1 hour PRN  sodium chloride 0.9% lock flush 10 milliLiter(s) IV Push every 1 hour PRN    acetaminophen    Suspension .. 650 milliGRAM(s) Oral every 12 hours PRN  acetaZOLAMIDE  IVPB 250 milliGRAM(s) IV Intermittent every 12 hours  ALBUTerol    90 MICROgram(s) HFA Inhaler 2 Puff(s) Inhalation every 6 hours PRN  artificial  tears Solution 1 Drop(s) Both EYES every 4 hours PRN  ascorbic acid 1000 milliGRAM(s) Oral daily  BACItracin   Ointment 1 Application(s) Topical two times a day  chlorhexidine 0.12% Liquid 15 milliLiter(s) Oral Mucosa every 12 hours  chlorhexidine 2% Cloths 1 Application(s) Topical <User Schedule>  clonazePAM  Tablet 2 milliGRAM(s) Oral every 8 hours  dexMEDEtomidine Infusion 0.2 MICROgram(s)/kG/Hr IV Continuous <Continuous>  enoxaparin Injectable 40 milliGRAM(s) SubCutaneous every 24 hours  furosemide   Injectable 40 milliGRAM(s) IV Push every 12 hours  insulin lispro (ADMELOG) corrective regimen sliding scale   SubCutaneous every 6 hours  LORazepam   Injectable 2 milliGRAM(s) IV Push every 6 hours PRN  methadone    Tablet 5 milliGRAM(s) Oral every 8 hours  midodrine 10 milliGRAM(s) Oral every 8 hours  OLANZapine 10 milliGRAM(s) Oral every 12 hours  pantoprazole  Injectable 40 milliGRAM(s) IV Push every 12 hours  phenylephrine    Infusion 1 MICROgram(s)/kG/Min IV Continuous <Continuous>  polyethylene glycol 3350 17 Gram(s) Oral two times a day  predniSONE   Tablet 40 milliGRAM(s) Oral daily  sodium chloride 0.9% lock flush 10 milliLiter(s) IV Push every 1 hour PRN  sodium chloride 0.9% lock flush 10 milliLiter(s) IV Push every 1 hour PRN  tamsulosin 0.4 milliGRAM(s) Oral at bedtime  trimethoprim  160 mG/sulfamethoxazole 800 mG 1 Tablet(s) Oral <User Schedule>    Drug Dosing Weight  Height (cm): 167.6 (2021 23:15)  Weight (kg): 83.9 (2021 23:15)  BMI (kg/m2): 29.9 (2021 23:15)  BSA (m2): 1.93 (2021 23:15)    CENTRAL LINE: [x] YES [] NO  LOCATION:   [facility] DATE INSERTED:  REMOVE: [] YES [] NO  EXPLAIN:    RIVERA: [x] YES [] NO    DATE INSERTED:   REMOVE:  [] YES [] NO  EXPLAIN: Strict IOs    A-LINE:  [] YES [x] NO  LOCATION:   DATE INSERTED:  REMOVE:  [] YES [] NO  EXPLAIN:    PMH -reviewed admission note, no change since admission  PAST MEDICAL & SURGICAL HISTORY:  No pertinent past medical history    S/P moody      S/P appendectomy          ICU Vital Signs Last 24 Hrs  T(C): 37.6 (10 May 2021 05:00), Max: 37.6 (10 May 2021 05:00)  T(F): 99.6 (10 May 2021 05:00), Max: 99.6 (10 May 2021 05:00)  HR: 91 (10 May 2021 07:00) (76 - 171)  BP: 98/60 (10 May 2021 07:00) (64/42 - 202/150)  BP(mean): 67 (10 May 2021 07:00) (44 - 163)  ABP: --  ABP(mean): --  RR: 19 (10 May 2021 07:00) (19 - 42)  SpO2: 99% (10 May 2021 07:00) (90% - 100%)      ABG - ( 10 May 2021 04:21 )  pH, Arterial: 7.47  pH, Blood: x     /  pCO2: 59    /  pO2: 160   / HCO3: 43    / Base Excess: 16.2  /  SaO2: 100                    @ 07:01  -  05-10 @ 07:00  --------------------------------------------------------  IN: 2540.2 mL / OUT: 4505 mL / NET: -1964.8 mL        Mode: AC/ CMV (Assist Control/ Continuous Mandatory Ventilation)  RR (machine): 20  TV (machine): 400  FiO2: 40  PEEP: 5  ITime: 1  MAP: 12  PIP: 33      REVIEW OF SYSTEMS:    Unable to complete - patient trach    PHYSICAL EXAM:    GENERAL: NAD, intermittently restless  HEAD:  Atraumatic, Normocephalic  EYES: EOMI, PERRL, conjunctiva and sclera clear  NECK: Supple, normal appearance, No JVD; Normal thyroid; Trachea midline  NERVOUS SYSTEM:  Awake and alert, Moving all 4 extremities  CHEST/LUNx chest tube on L. No chest deformity; Lungs clear to auscultation b/l. No rales, rhonchi, wheezing   HEART: Tachycardic Regular rhythm; No murmurs, rubs, or gallops  ABDOMEN: PEG in place. Soft, Nontender, Nondistended; Bowel sounds present  EXTREMITIES:  Mild dependent edema. R arm edema and ecchymosis.  Peripheral Pulses intact, No clubbing, cyanosis,   SKIN: Intact, No rashes or lesions    LABS:  CBC Full  -  ( 09 May 2021 06:20 )  WBC Count : 10.37 K/uL  RBC Count : 2.51 M/uL  Hemoglobin : 7.9 g/dL  Hematocrit : 25.5 %  Platelet Count - Automated : 342 K/uL  Mean Cell Volume : 101.6 fl  Mean Cell Hemoglobin : 31.5 pg  Mean Cell Hemoglobin Concentration : 31.0 gm/dL  Auto Neutrophil # : 7.89 K/uL  Auto Lymphocyte # : 1.53 K/uL  Auto Monocyte # : 0.80 K/uL  Auto Eosinophil # : 0.04 K/uL  Auto Basophil # : 0.03 K/uL  Auto Neutrophil % : 76.0 %  Auto Lymphocyte % : 14.8 %  Auto Monocyte % : 7.7 %  Auto Eosinophil % : 0.4 %  Auto Basophil % : 0.3 %    05-10    141  |  100  |  12  ----------------------------<  115<H>  3.6   |  39<H>  |  0.40<L>    Ca    8.6      10 May 2021 05:49  Phos  4.6     05-10  Mg     2.1     05-10    TPro  7.0  /  Alb  3.2<L>  /  TBili  0.8  /  DBili  x   /  AST  18  /  ALT  21  /  AlkPhos  95  05-10            RADIOLOGY & ADDITIONAL STUDIES REVIEWED:  ***      GOALS OF CARE DISCUSSION WITH PATIENT/FAMILY/PROXY:

## 2021-05-10 NOTE — PROGRESS NOTE ADULT - ASSESSMENT
· Assessment	  55 year old male with COVID pneumonia has been on vent.  he also has CT for Pnx.  His H/H has dropped recently without obvious bleeding.  he is off antibiotics.    1. worsening pneumonia  no blood in ET tube, unlikely intrapulmonary bleeding    2. anemia, retic was elevated.  Haptoglobin normal  ?bleeding  arms are very swollen and ecchymotic  ?bleeding in arms  abdomen is not distended  retic is normal now  Hb stabilized in the last few days  ?sepsis. hemolysis is unlikely even his baseline haptoglobin may be very elevated due to COVID  but will check direct pamella, which is neg  COVID is known to cause cold agglutinin  direct pamella is neg

## 2021-05-11 LAB
ALBUMIN SERPL ELPH-MCNC: 3.1 G/DL — LOW (ref 3.5–5)
ALP SERPL-CCNC: 97 U/L — SIGNIFICANT CHANGE UP (ref 40–120)
ALT FLD-CCNC: 26 U/L DA — SIGNIFICANT CHANGE UP (ref 10–60)
ANION GAP SERPL CALC-SCNC: 5 MMOL/L — SIGNIFICANT CHANGE UP (ref 5–17)
AST SERPL-CCNC: 19 U/L — SIGNIFICANT CHANGE UP (ref 10–40)
BASE EXCESS BLDA CALC-SCNC: 9.7 MMOL/L — HIGH (ref -2–3)
BILIRUB SERPL-MCNC: 0.8 MG/DL — SIGNIFICANT CHANGE UP (ref 0.2–1.2)
BLOOD GAS COMMENTS ARTERIAL: SIGNIFICANT CHANGE UP
BUN SERPL-MCNC: 21 MG/DL — HIGH (ref 7–18)
CALCIUM SERPL-MCNC: 8.6 MG/DL — SIGNIFICANT CHANGE UP (ref 8.4–10.5)
CHLORIDE SERPL-SCNC: 101 MMOL/L — SIGNIFICANT CHANGE UP (ref 96–108)
CO2 SERPL-SCNC: 35 MMOL/L — HIGH (ref 22–31)
CREAT SERPL-MCNC: 0.34 MG/DL — LOW (ref 0.5–1.3)
GLUCOSE BLDC GLUCOMTR-MCNC: 103 MG/DL — HIGH (ref 70–99)
GLUCOSE BLDC GLUCOMTR-MCNC: 132 MG/DL — HIGH (ref 70–99)
GLUCOSE BLDC GLUCOMTR-MCNC: 132 MG/DL — HIGH (ref 70–99)
GLUCOSE BLDC GLUCOMTR-MCNC: 193 MG/DL — HIGH (ref 70–99)
GLUCOSE SERPL-MCNC: 142 MG/DL — HIGH (ref 70–99)
HCO3 BLDA-SCNC: 36 MMOL/L — HIGH (ref 21–28)
HCT VFR BLD CALC: 28.7 % — LOW (ref 39–50)
HGB BLD-MCNC: 9.5 G/DL — LOW (ref 13–17)
HOROWITZ INDEX BLDA+IHG-RTO: 40 — SIGNIFICANT CHANGE UP
MAGNESIUM SERPL-MCNC: 2.3 MG/DL — SIGNIFICANT CHANGE UP (ref 1.6–2.6)
MCHC RBC-ENTMCNC: 33.1 GM/DL — SIGNIFICANT CHANGE UP (ref 32–36)
MCHC RBC-ENTMCNC: 34.7 PG — HIGH (ref 27–34)
MCV RBC AUTO: 104.7 FL — HIGH (ref 80–100)
NRBC # BLD: 0 /100 WBCS — SIGNIFICANT CHANGE UP (ref 0–0)
PCO2 BLDA: 56 MMHG — HIGH (ref 35–48)
PH BLDA: 7.42 — SIGNIFICANT CHANGE UP (ref 7.35–7.45)
PHOSPHATE SERPL-MCNC: 2.6 MG/DL — SIGNIFICANT CHANGE UP (ref 2.5–4.5)
PLATELET # BLD AUTO: 403 K/UL — HIGH (ref 150–400)
PO2 BLDA: 146 MMHG — HIGH (ref 83–108)
POTASSIUM SERPL-MCNC: 3.4 MMOL/L — LOW (ref 3.5–5.3)
POTASSIUM SERPL-SCNC: 3.4 MMOL/L — LOW (ref 3.5–5.3)
PROT SERPL-MCNC: 7.5 G/DL — SIGNIFICANT CHANGE UP (ref 6–8.3)
RBC # BLD: 2.74 M/UL — LOW (ref 4.2–5.8)
RBC # FLD: 17 % — HIGH (ref 10.3–14.5)
SAO2 % BLDA: 100 % — SIGNIFICANT CHANGE UP
SODIUM SERPL-SCNC: 141 MMOL/L — SIGNIFICANT CHANGE UP (ref 135–145)
WBC # BLD: 12.86 K/UL — HIGH (ref 3.8–10.5)
WBC # FLD AUTO: 12.86 K/UL — HIGH (ref 3.8–10.5)

## 2021-05-11 PROCEDURE — 71045 X-RAY EXAM CHEST 1 VIEW: CPT | Mod: 26

## 2021-05-11 RX ORDER — METHADONE HYDROCHLORIDE 40 MG/1
5 TABLET ORAL EVERY 8 HOURS
Refills: 0 | Status: DISCONTINUED | OUTPATIENT
Start: 2021-05-11 | End: 2021-05-12

## 2021-05-11 RX ORDER — PANTOPRAZOLE SODIUM 20 MG/1
40 TABLET, DELAYED RELEASE ORAL DAILY
Refills: 0 | Status: DISCONTINUED | OUTPATIENT
Start: 2021-05-11 | End: 2021-05-11

## 2021-05-11 RX ORDER — DOXAZOSIN MESYLATE 4 MG
1 TABLET ORAL AT BEDTIME
Refills: 0 | Status: DISCONTINUED | OUTPATIENT
Start: 2021-05-11 | End: 2021-05-14

## 2021-05-11 RX ORDER — QUETIAPINE FUMARATE 200 MG/1
50 TABLET, FILM COATED ORAL
Refills: 0 | Status: DISCONTINUED | OUTPATIENT
Start: 2021-05-11 | End: 2021-05-12

## 2021-05-11 RX ORDER — FUROSEMIDE 40 MG
40 TABLET ORAL DAILY
Refills: 0 | Status: DISCONTINUED | OUTPATIENT
Start: 2021-05-12 | End: 2021-05-13

## 2021-05-11 RX ORDER — PANTOPRAZOLE SODIUM 20 MG/1
40 TABLET, DELAYED RELEASE ORAL DAILY
Refills: 0 | Status: DISCONTINUED | OUTPATIENT
Start: 2021-05-11 | End: 2021-05-27

## 2021-05-11 RX ORDER — FENTANYL CITRATE 50 UG/ML
25 INJECTION INTRAVENOUS ONCE
Refills: 0 | Status: DISCONTINUED | OUTPATIENT
Start: 2021-05-11 | End: 2021-05-11

## 2021-05-11 RX ORDER — POTASSIUM CHLORIDE 20 MEQ
40 PACKET (EA) ORAL ONCE
Refills: 0 | Status: COMPLETED | OUTPATIENT
Start: 2021-05-11 | End: 2021-05-11

## 2021-05-11 RX ORDER — MIDODRINE HYDROCHLORIDE 2.5 MG/1
20 TABLET ORAL EVERY 8 HOURS
Refills: 0 | Status: DISCONTINUED | OUTPATIENT
Start: 2021-05-11 | End: 2021-05-13

## 2021-05-11 RX ORDER — METOCLOPRAMIDE HCL 10 MG
10 TABLET ORAL EVERY 6 HOURS
Refills: 0 | Status: DISCONTINUED | OUTPATIENT
Start: 2021-05-11 | End: 2021-05-17

## 2021-05-11 RX ADMIN — POLYETHYLENE GLYCOL 3350 17 GRAM(S): 17 POWDER, FOR SOLUTION ORAL at 17:12

## 2021-05-11 RX ADMIN — DEXMEDETOMIDINE HYDROCHLORIDE IN 0.9% SODIUM CHLORIDE 4.2 MICROGRAM(S)/KG/HR: 4 INJECTION INTRAVENOUS at 11:34

## 2021-05-11 RX ADMIN — Medication 1: at 11:00

## 2021-05-11 RX ADMIN — QUETIAPINE FUMARATE 50 MILLIGRAM(S): 200 TABLET, FILM COATED ORAL at 17:11

## 2021-05-11 RX ADMIN — Medication 40 MILLIGRAM(S): at 05:51

## 2021-05-11 RX ADMIN — Medication 1 APPLICATION(S): at 05:47

## 2021-05-11 RX ADMIN — Medication 2 MILLIGRAM(S): at 08:06

## 2021-05-11 RX ADMIN — PHENYLEPHRINE HYDROCHLORIDE 15.7 MICROGRAM(S)/KG/MIN: 10 INJECTION INTRAVENOUS at 05:52

## 2021-05-11 RX ADMIN — MIDODRINE HYDROCHLORIDE 10 MILLIGRAM(S): 2.5 TABLET ORAL at 05:48

## 2021-05-11 RX ADMIN — Medication 10 MILLIGRAM(S): at 17:11

## 2021-05-11 RX ADMIN — Medication 1 MILLIGRAM(S): at 21:19

## 2021-05-11 RX ADMIN — DEXMEDETOMIDINE HYDROCHLORIDE IN 0.9% SODIUM CHLORIDE 4.2 MICROGRAM(S)/KG/HR: 4 INJECTION INTRAVENOUS at 14:00

## 2021-05-11 RX ADMIN — Medication 10 MILLIGRAM(S): at 23:15

## 2021-05-11 RX ADMIN — POLYETHYLENE GLYCOL 3350 17 GRAM(S): 17 POWDER, FOR SOLUTION ORAL at 05:47

## 2021-05-11 RX ADMIN — CHLORHEXIDINE GLUCONATE 1 APPLICATION(S): 213 SOLUTION TOPICAL at 05:48

## 2021-05-11 RX ADMIN — PANTOPRAZOLE SODIUM 40 MILLIGRAM(S): 20 TABLET, DELAYED RELEASE ORAL at 05:47

## 2021-05-11 RX ADMIN — DEXMEDETOMIDINE HYDROCHLORIDE IN 0.9% SODIUM CHLORIDE 4.2 MICROGRAM(S)/KG/HR: 4 INJECTION INTRAVENOUS at 01:33

## 2021-05-11 RX ADMIN — METHADONE HYDROCHLORIDE 5 MILLIGRAM(S): 40 TABLET ORAL at 13:46

## 2021-05-11 RX ADMIN — CHLORHEXIDINE GLUCONATE 15 MILLILITER(S): 213 SOLUTION TOPICAL at 05:47

## 2021-05-11 RX ADMIN — Medication 10 MILLIGRAM(S): at 12:19

## 2021-05-11 RX ADMIN — Medication 2 MILLIGRAM(S): at 05:47

## 2021-05-11 RX ADMIN — Medication 40 MILLIEQUIVALENT(S): at 07:50

## 2021-05-11 RX ADMIN — Medication 2 MILLIGRAM(S): at 13:46

## 2021-05-11 RX ADMIN — ENOXAPARIN SODIUM 40 MILLIGRAM(S): 100 INJECTION SUBCUTANEOUS at 11:00

## 2021-05-11 RX ADMIN — CHLORHEXIDINE GLUCONATE 15 MILLILITER(S): 213 SOLUTION TOPICAL at 17:11

## 2021-05-11 RX ADMIN — Medication 2 MILLIGRAM(S): at 05:51

## 2021-05-11 RX ADMIN — Medication 40 MILLIGRAM(S): at 05:48

## 2021-05-11 RX ADMIN — FENTANYL CITRATE 25 MICROGRAM(S): 50 INJECTION INTRAVENOUS at 09:56

## 2021-05-11 RX ADMIN — Medication 1 APPLICATION(S): at 17:11

## 2021-05-11 RX ADMIN — DEXMEDETOMIDINE HYDROCHLORIDE IN 0.9% SODIUM CHLORIDE 4.2 MICROGRAM(S)/KG/HR: 4 INJECTION INTRAVENOUS at 18:09

## 2021-05-11 RX ADMIN — DEXMEDETOMIDINE HYDROCHLORIDE IN 0.9% SODIUM CHLORIDE 4.2 MICROGRAM(S)/KG/HR: 4 INJECTION INTRAVENOUS at 04:46

## 2021-05-11 RX ADMIN — Medication 10 MILLIGRAM(S): at 11:02

## 2021-05-11 RX ADMIN — Medication 2 MILLIGRAM(S): at 21:19

## 2021-05-11 RX ADMIN — METHADONE HYDROCHLORIDE 5 MILLIGRAM(S): 40 TABLET ORAL at 21:19

## 2021-05-11 RX ADMIN — Medication 2 MILLIGRAM(S): at 21:18

## 2021-05-11 RX ADMIN — DEXMEDETOMIDINE HYDROCHLORIDE IN 0.9% SODIUM CHLORIDE 4.2 MICROGRAM(S)/KG/HR: 4 INJECTION INTRAVENOUS at 21:45

## 2021-05-11 RX ADMIN — DEXMEDETOMIDINE HYDROCHLORIDE IN 0.9% SODIUM CHLORIDE 4.2 MICROGRAM(S)/KG/HR: 4 INJECTION INTRAVENOUS at 07:36

## 2021-05-11 RX ADMIN — Medication 1000 MILLIGRAM(S): at 11:01

## 2021-05-11 RX ADMIN — MIDODRINE HYDROCHLORIDE 20 MILLIGRAM(S): 2.5 TABLET ORAL at 13:46

## 2021-05-11 RX ADMIN — FENTANYL CITRATE 25 MICROGRAM(S): 50 INJECTION INTRAVENOUS at 10:55

## 2021-05-11 RX ADMIN — MIDODRINE HYDROCHLORIDE 20 MILLIGRAM(S): 2.5 TABLET ORAL at 21:18

## 2021-05-11 NOTE — PROGRESS NOTE ADULT - SUBJECTIVE AND OBJECTIVE BOX
INTERVAL HPI/OVERNIGHT EVENTS:    Pt seen and examined at bedside. No acute events overnight.   On pressors support.     Vital Signs Last 24 Hrs  T(C): 37.1 (11 May 2021 04:53), Max: 37.5 (10 May 2021 15:55)  T(F): 98.8 (11 May 2021 04:53), Max: 99.5 (10 May 2021 15:55)  HR: 150 (11 May 2021 08:07) (70 - 153)  BP: 141/71 (11 May 2021 08:00) (77/12 - 184/105)  BP(mean): 88 (11 May 2021 08:00) (28 - 117)  RR: 44 (11 May 2021 08:07) (18 - 44)  SpO2: 97% (11 May 2021 08:07) (93% - 100%)  I&O's Detail    10 May 2021 07:01  -  11 May 2021 07:00  --------------------------------------------------------  IN:    Dexmedetomidine: 724.5 mL    Free Water: 750 mL    IV PiggyBack: 50 mL    Jevity 1.5: 640 mL    Osmolite 1.2: 280 mL    Phenylephrine: 412.3 mL  Total IN: 2856.8 mL    OUT:    Chest Tube (mL): 45 mL    Chest Tube (mL): 0 mL    Indwelling Catheter - Urethral (mL): 1890 mL  Total OUT: 1935 mL    Total NET: 921.8 mL        bisacodyl Suppository 10 milliGRAM(s) Rectal daily  pantoprazole  Injectable 40 milliGRAM(s) IV Push every 12 hours  polyethylene glycol 3350 17 Gram(s) Oral two times a day  trimethoprim  160 mG/sulfamethoxazole 800 mG 1 Tablet(s) Oral <User Schedule>      Physical Exam  Pulm: intubated  Chest: L anterior pigtail no airleak; posterior CT no airleak, to suction  Abdomen: Soft, nondistended, G tube in place securely on feeds, prior seroma i&d site packed with WTD      Labs:                        9.5    12.86 )-----------( 403      ( 11 May 2021 03:48 )             28.7     05-11    141  |  101  |  21<H>  ----------------------------<  142<H>  3.4<L>   |  35<H>  |  0.34<L>    Ca    8.6      11 May 2021 03:48  Phos  2.6     05-11  Mg     2.3     05-11    TPro  7.5  /  Alb  3.1<L>  /  TBili  0.8  /  DBili  x   /  AST  19  /  ALT  26  /  AlkPhos  97  05-11

## 2021-05-11 NOTE — PROGRESS NOTE ADULT - SUBJECTIVE AND OBJECTIVE BOX
Patient is a 55y old  Male who presents with a chief complaint of SOB (11 May 2021 08:22)  Patient is awake, responsive on ventilator via trach. Tachypneic in mild distress    INTERVAL HPI/OVERNIGHT EVENTS:      VITAL SIGNS:  T(F): 98.9 (05-11-21 @ 07:00)  HR: 110 (05-11-21 @ 09:45)  BP: 209/140 (05-11-21 @ 09:45)  RR: 30 (05-11-21 @ 09:45)  SpO2: 100% (05-11-21 @ 09:45)  Wt(kg): --  I&O's Detail    10 May 2021 07:01  -  11 May 2021 07:00  --------------------------------------------------------  IN:    Dexmedetomidine: 756 mL    Free Water: 750 mL    IV PiggyBack: 50 mL    Jevity 1.5: 680 mL    Osmolite 1.2: 280 mL    Phenylephrine: 431.2 mL  Total IN: 2947.2 mL    OUT:    Chest Tube (mL): 45 mL    Chest Tube (mL): 0 mL    Indwelling Catheter - Urethral (mL): 1965 mL  Total OUT: 2010 mL    Total NET: 937.2 mL      11 May 2021 07:01  -  11 May 2021 10:00  --------------------------------------------------------  IN:    Dexmedetomidine: 63 mL    Jevity 1.5: 80 mL    Phenylephrine: 37.8 mL  Total IN: 180.8 mL    OUT:    Indwelling Catheter - Urethral (mL): 260 mL  Total OUT: 260 mL    Total NET: -79.2 mL        Mode: AC/ CMV (Assist Control/ Continuous Mandatory Ventilation)  RR (machine): 14  TV (machine): 400  FiO2: 40  PEEP: 5  ITime: 1  MAP: 20  PIP: 45        REVIEW OF SYSTEMS:    CONSTITUTIONAL:  No fevers, chills, sweats    HEENT:  Eyes:  No diplopia or blurred vision. ENT:  No earache, sore throat or runny nose.    CARDIOVASCULAR:  No pressure, squeezing, tightness, or heaviness about the chest; no palpitations.    RESPIRATORY:  Per HPI    GASTROINTESTINAL:  No abdominal pain, nausea, vomiting or diarrhea.    GENITOURINARY:  No dysuria, frequency or urgency.    NEUROLOGIC:  No paresthesias, fasciculations, seizures or weakness.    PSYCHIATRIC:  No disorder of thought or mood.      PHYSICAL EXAM:    Constitutional: Well developed and nourished  Eyes:Perrla  ENMT: normal  Neck:supple  Respiratory: Occasional wheezes bilaterally  Cardiovascular: S1 S2 regular  Gastrointestinal: Soft, Non tender  Extremities: No edema  Vascular:normal  Neurological:Awake, alert  Musculoskeletal:Normal      MEDICATIONS  (STANDING):  ascorbic acid 1000 milliGRAM(s) Oral daily  BACItracin   Ointment 1 Application(s) Topical two times a day  bisacodyl Suppository 10 milliGRAM(s) Rectal daily  chlorhexidine 0.12% Liquid 15 milliLiter(s) Oral Mucosa every 12 hours  chlorhexidine 2% Cloths 1 Application(s) Topical <User Schedule>  clonazePAM  Tablet 2 milliGRAM(s) Oral every 8 hours  dexMEDEtomidine Infusion 0.2 MICROgram(s)/kG/Hr (4.2 mL/Hr) IV Continuous <Continuous>  doxazosin 1 milliGRAM(s) Oral at bedtime  enoxaparin Injectable 40 milliGRAM(s) SubCutaneous every 24 hours  insulin lispro (ADMELOG) corrective regimen sliding scale   SubCutaneous every 6 hours  methadone    Tablet 5 milliGRAM(s) Oral every 8 hours  midodrine 20 milliGRAM(s) Oral every 8 hours  pantoprazole   Suspension 40 milliGRAM(s) Oral daily  phenylephrine    Infusion 1 MICROgram(s)/kG/Min (15.7 mL/Hr) IV Continuous <Continuous>  polyethylene glycol 3350 17 Gram(s) Oral two times a day  QUEtiapine 50 milliGRAM(s) Oral two times a day  trimethoprim  160 mG/sulfamethoxazole 800 mG 1 Tablet(s) Oral <User Schedule>    MEDICATIONS  (PRN):  acetaminophen    Suspension .. 650 milliGRAM(s) Oral every 12 hours PRN Temp greater or equal to 38C (100.4F)  ALBUTerol    90 MICROgram(s) HFA Inhaler 2 Puff(s) Inhalation every 6 hours PRN Shortness of Breath and/or Wheezing  artificial  tears Solution 1 Drop(s) Both EYES every 4 hours PRN Dry Eyes  LORazepam   Injectable 2 milliGRAM(s) IV Push every 6 hours PRN Agitation  sodium chloride 0.9% lock flush 10 milliLiter(s) IV Push every 1 hour PRN Pre/post blood products, medications, blood draw, and to maintain line patency      Allergies    No Known Allergies    Intolerances        LABS:                        9.5    12.86 )-----------( 403      ( 11 May 2021 03:48 )             28.7     05-11    141  |  101  |  21<H>  ----------------------------<  142<H>  3.4<L>   |  35<H>  |  0.34<L>    Ca    8.6      11 May 2021 03:48  Phos  2.6     05-11  Mg     2.3     05-11    TPro  7.5  /  Alb  3.1<L>  /  TBili  0.8  /  DBili  x   /  AST  19  /  ALT  26  /  AlkPhos  97  05-11        ABG - ( 11 May 2021 04:20 )  pH, Arterial: 7.42  pH, Blood: x     /  pCO2: 56    /  pO2: 146   / HCO3: 36    / Base Excess: 9.7   /  SaO2: 100                   CAPILLARY BLOOD GLUCOSE      POCT Blood Glucose.: 132 mg/dL (11 May 2021 06:14)  POCT Blood Glucose.: 157 mg/dL (10 May 2021 23:40)  POCT Blood Glucose.: 164 mg/dL (10 May 2021 16:52)  POCT Blood Glucose.: 202 mg/dL (10 May 2021 10:59)        RADIOLOGY & ADDITIONAL TESTS:    CXR:  < from: Xray Chest 1 View-PORTABLE IMMEDIATE (Xray Chest 1 View-PORTABLE IMMEDIATE .) (05.10.21 @ 15:07) >  IMPRESSION: Diminished left apical pneumothorax after chest tube manipulation. Persistent advanced infiltrates.    < end of copied text >    Ct scan chest:    ekg;    echo:

## 2021-05-11 NOTE — PROGRESS NOTE ADULT - SUBJECTIVE AND OBJECTIVE BOX
Patient is a 55y old  Male who presents with a chief complaint of SOB (10 May 2021 11:30)    pt seen in icu [ x ], reg med floor [   ], bed [ x ], chair at bedside [   ], awake and responsive [  ], mildly sedated, [x  ],    nad [x  ]      Allergies    No Known Allergies        Vitals    T(F): 98.8 (05-11-21 @ 04:53), Max: 99.5 (05-10-21 @ 15:55)  HR: 153 (05-11-21 @ 06:30) (70 - 153)  BP: 143/88 (05-11-21 @ 06:30) (77/12 - 184/105)  RR: 39 (05-11-21 @ 06:30) (18 - 40)  SpO2: 98% (05-11-21 @ 06:30) (93% - 100%)  Wt(kg): --  CAPILLARY BLOOD GLUCOSE      POCT Blood Glucose.: 132 mg/dL (11 May 2021 06:14)      Labs                          9.5    12.86 )-----------( 403      ( 11 May 2021 03:48 )             28.7       05-11    141  |  101  |  21<H>  ----------------------------<  142<H>  3.4<L>   |  35<H>  |  0.34<L>    Ca    8.6      11 May 2021 03:48  Phos  2.6     05-11  Mg     2.3     05-11    TPro  7.5  /  Alb  3.1<L>  /  TBili  0.8  /  DBili  x   /  AST  19  /  ALT  26  /  AlkPhos  97  05-11            .Sputum Sputum  04-16 @ 04:42   Moderate Enterobacter aerogenes (Carbapenem Resistant)  Normal Respiratory Monserrat present  --  Enterobacter aerogenes (Carbapenem Resistant)      .Urine Clean Catch (Midstream)  03-15 @ 00:52   No growth  --  --          Radiology Results      Meds    MEDICATIONS  (STANDING):  ascorbic acid 1000 milliGRAM(s) Oral daily  BACItracin   Ointment 1 Application(s) Topical two times a day  bisacodyl Suppository 10 milliGRAM(s) Rectal daily  chlorhexidine 0.12% Liquid 15 milliLiter(s) Oral Mucosa every 12 hours  chlorhexidine 2% Cloths 1 Application(s) Topical <User Schedule>  clonazePAM  Tablet 2 milliGRAM(s) Oral every 8 hours  dexMEDEtomidine Infusion 0.2 MICROgram(s)/kG/Hr (4.2 mL/Hr) IV Continuous <Continuous>  enoxaparin Injectable 40 milliGRAM(s) SubCutaneous every 24 hours  furosemide   Injectable 40 milliGRAM(s) IV Push every 12 hours  insulin lispro (ADMELOG) corrective regimen sliding scale   SubCutaneous every 6 hours  midodrine 10 milliGRAM(s) Oral every 8 hours  pantoprazole  Injectable 40 milliGRAM(s) IV Push every 12 hours  phenylephrine    Infusion 1 MICROgram(s)/kG/Min (15.7 mL/Hr) IV Continuous <Continuous>  polyethylene glycol 3350 17 Gram(s) Oral two times a day  predniSONE   Tablet 40 milliGRAM(s) Oral daily  QUEtiapine 50 milliGRAM(s) Oral at bedtime  tamsulosin 0.4 milliGRAM(s) Oral at bedtime  trimethoprim  160 mG/sulfamethoxazole 800 mG 1 Tablet(s) Oral <User Schedule>      MEDICATIONS  (PRN):  acetaminophen    Suspension .. 650 milliGRAM(s) Oral every 12 hours PRN Temp greater or equal to 38C (100.4F)  ALBUTerol    90 MICROgram(s) HFA Inhaler 2 Puff(s) Inhalation every 6 hours PRN Shortness of Breath and/or Wheezing  artificial  tears Solution 1 Drop(s) Both EYES every 4 hours PRN Dry Eyes  LORazepam   Injectable 2 milliGRAM(s) IV Push every 6 hours PRN Agitation  sodium chloride 0.9% lock flush 10 milliLiter(s) IV Push every 1 hour PRN Pre/post blood products, medications, blood draw, and to maintain line patency  sodium chloride 0.9% lock flush 10 milliLiter(s) IV Push every 1 hour PRN Pre/post blood products, medications, blood draw, and to maintain line patency      Physical Exam    Neuro :  no focal deficits  Respiratory: CTA B/L  CV: RRR, S1S2, no murmurs,   Abdominal: Soft, NT, ND +BS, g- tube in place, dressing cdi  Extremities: b/l ue edema, + peripheral pulses      ASSESSMENT    Hypoxemia 2nd to covid pna   transaminitis  prediabetes  h/o appendectomy  cholecystectomy        PLAN    contact and airborne isolation  d/c remdesevir given covid ab positive noted   completed dexamethasone   started pulse steroids for 3 days - 250mg solumedrol bid now tapered off 4/14/21   C/w prednisone 40 daily for possible organizing pneumonia   Started on bactrim empirically, TIW  cont asa, vit c,    cont albuterol inhaler   pulm f/u  procalcitonin, D-dimer, crp, ldh, ferritin, lactate noted ,    afebrile   cont tylenol prn,   cont robitussin prn   pt on precedex drip    pt on fentanyl patch  off pressors   s/p intubation 3/29/21   s/p tracheostomy 4/21/21  O2 sat 98% (90% - 100%) on mech vent  O2 via mech vent and taper fio2 as tolerated   vent mgmt as per icu  xray 3/19/21 with pneumomediastinum  rept cxr with New trace right apical pneumothorax. New mild left apical pneumothorax. Grossly stable small pneumomediastinum.  Soft tissue emphysema at the neck bases bilaterally. Grossly stable bilateral pulmonary infiltrates noted.   cxr 2/24 with No evidence of pneumothorax can be appreciated on the available image. This may be related to patient positioning. Evidence of pneumomediastinum and subcutaneous emphysema in the lower neck is again noted. There are patchy bibasilar infiltrates and elevated right hemidiaphragm noted.   cxr 3/29/21 with No significant change bilateral infiltrates. There is a small simple left apical pneumothorax. No significant pleural effusion. Bilateral subcutaneous emphysema similar to prior.   XRs on 7AM and 5PM with no obvious increase of ptx  cxr 4/4/21 with Improving bilateral airspace disease noted    cxr 4/8/21 with Small left pneumothorax noted.  thoracic surg f/u   s/p L chest tube replacement 4/18/21 for recurrence of left pneumothorax   cxr 4/20/21 with Unchanged advanced infiltrates and catheter left chest tube noted   CXR 4/11/21 with : No interval change compared to one day prior. No pneumothorax noted.   unable to flush or aspirate tube fully, noted debris in tubing which was milked out.   Once material removed from tubing able to aspirated and flush fully. Also changed dressing on pigtail which appears to be in good position.   Monitor O2 status   s/p tracheostomy 4/21/21   stay sutures out 2 weeks from OR date  cxr 4/21/21 with No evidence of active chest disease. Tracheostomy tube in place otherwise no significant change noted   cxr 4/25/21 with A 40% LEFT pneumothorax. LEFT multi-sidehole pigtail catheter overlies LEFT lower hemithorax.. Bilateral multifocal and diffuse ill-defined airspace opacities..  Follow-up AP portable chest radiograph 4/25/2021 AT 8:58 AM: Residual LEFT 30% upper zone pneumothorax. Otherwise no interval change.noted    cxr 4/30/21 Tracheostomy tube again noted. Left chest tube noted with small left apical pneumothorax unchanged. Bilateral airspace opacities overall worsening. Heart size cannot be accurately assessed in this projection noted.   cxr 5/3/21 with Large left pneumothorax noted above.   cxr 5 4/21 with No significant interval change noted above.   ct chest with Extensive chronic appearing consolidative opacities throughout the lungs, most prominent in the left lower lobe and lingula, with bronchiectasis, scarring, and volume loss. Additional scattered peripheral coarse opacities.   Mild left pneumothorax. Left lateral pleural space pigtail catheter, not in continuity with the pneumothorax. Mild bilateral hydronephrosis, similar to appearance on CT of the abdomen and pelvis on April 27. noted above   s/p 2nd chest tube 5/5/21  cxr 5/6/21 with Tracheostomy and catheter left chest tubes remain. , There are significant diffuse advanced infiltrates again noted. No pneumothorax. Above findings are similar to study earlier in the day. Present film shows a left jugular line inserted   with tip entering the superior vena cava noted above.  s/p surgical placement of gastrostomy tube 4/23/2021.   cont on tube feeding  id f/u   No need for further antibiotics as patient completed course of Meropenem  leukocytosis resolved  speutum cx with Enterobacter aerogenes (Carbapenem Resistant) noted above  andrea   lispro ss   prognosis poor   s/p 4 units prbc for anemia  f/u h/h    transfuse prbc as needed  check vitamin b12  heme onc f/u  retic is elevated.    Haptoglobin normal  ? daily phlebotomy  no hemolysis, Fe/B12/folate adequate  hemolysis is unlikely even his baseline haptoglobin may be very elevated due to COVID  direct pamella neg noted    COVID is known to cause cold agglutinin  cont current meds  mgmt as per icu          Patient is a 55y old  Male who presents with a chief complaint of SOB (10 May 2021 11:30)    pt seen in icu [ x ], reg med floor [   ], bed [ x ], chair at bedside [   ], awake and responsive [ x ], mildly sedated, [  ],    nad [x  ]      Allergies    No Known Allergies        Vitals    T(F): 98.8 (05-11-21 @ 04:53), Max: 99.5 (05-10-21 @ 15:55)  HR: 153 (05-11-21 @ 06:30) (70 - 153)  BP: 143/88 (05-11-21 @ 06:30) (77/12 - 184/105)  RR: 39 (05-11-21 @ 06:30) (18 - 40)  SpO2: 98% (05-11-21 @ 06:30) (93% - 100%)  Wt(kg): --  CAPILLARY BLOOD GLUCOSE      POCT Blood Glucose.: 132 mg/dL (11 May 2021 06:14)      Labs                          9.5    12.86 )-----------( 403      ( 11 May 2021 03:48 )             28.7       05-11    141  |  101  |  21<H>  ----------------------------<  142<H>  3.4<L>   |  35<H>  |  0.34<L>    Ca    8.6      11 May 2021 03:48  Phos  2.6     05-11  Mg     2.3     05-11    TPro  7.5  /  Alb  3.1<L>  /  TBili  0.8  /  DBili  x   /  AST  19  /  ALT  26  /  AlkPhos  97  05-11            .Sputum Sputum  04-16 @ 04:42   Moderate Enterobacter aerogenes (Carbapenem Resistant)  Normal Respiratory Monserrat present  --  Enterobacter aerogenes (Carbapenem Resistant)      .Urine Clean Catch (Midstream)  03-15 @ 00:52   No growth  --  --          Radiology Results      Meds    MEDICATIONS  (STANDING):  ascorbic acid 1000 milliGRAM(s) Oral daily  BACItracin   Ointment 1 Application(s) Topical two times a day  bisacodyl Suppository 10 milliGRAM(s) Rectal daily  chlorhexidine 0.12% Liquid 15 milliLiter(s) Oral Mucosa every 12 hours  chlorhexidine 2% Cloths 1 Application(s) Topical <User Schedule>  clonazePAM  Tablet 2 milliGRAM(s) Oral every 8 hours  dexMEDEtomidine Infusion 0.2 MICROgram(s)/kG/Hr (4.2 mL/Hr) IV Continuous <Continuous>  enoxaparin Injectable 40 milliGRAM(s) SubCutaneous every 24 hours  furosemide   Injectable 40 milliGRAM(s) IV Push every 12 hours  insulin lispro (ADMELOG) corrective regimen sliding scale   SubCutaneous every 6 hours  midodrine 10 milliGRAM(s) Oral every 8 hours  pantoprazole  Injectable 40 milliGRAM(s) IV Push every 12 hours  phenylephrine    Infusion 1 MICROgram(s)/kG/Min (15.7 mL/Hr) IV Continuous <Continuous>  polyethylene glycol 3350 17 Gram(s) Oral two times a day  predniSONE   Tablet 40 milliGRAM(s) Oral daily  QUEtiapine 50 milliGRAM(s) Oral at bedtime  tamsulosin 0.4 milliGRAM(s) Oral at bedtime  trimethoprim  160 mG/sulfamethoxazole 800 mG 1 Tablet(s) Oral <User Schedule>      MEDICATIONS  (PRN):  acetaminophen    Suspension .. 650 milliGRAM(s) Oral every 12 hours PRN Temp greater or equal to 38C (100.4F)  ALBUTerol    90 MICROgram(s) HFA Inhaler 2 Puff(s) Inhalation every 6 hours PRN Shortness of Breath and/or Wheezing  artificial  tears Solution 1 Drop(s) Both EYES every 4 hours PRN Dry Eyes  LORazepam   Injectable 2 milliGRAM(s) IV Push every 6 hours PRN Agitation  sodium chloride 0.9% lock flush 10 milliLiter(s) IV Push every 1 hour PRN Pre/post blood products, medications, blood draw, and to maintain line patency  sodium chloride 0.9% lock flush 10 milliLiter(s) IV Push every 1 hour PRN Pre/post blood products, medications, blood draw, and to maintain line patency      Physical Exam    Neuro :  no focal deficits  Respiratory: CTA B/L  CV: RRR, S1S2, no murmurs,   Abdominal: Soft, NT, ND +BS, g- tube in place, dressing cdi  Extremities: b/l ue edema, + peripheral pulses      ASSESSMENT    Hypoxemia 2nd to covid pna   transaminitis  prediabetes  h/o appendectomy  cholecystectomy        PLAN    contact and airborne isolation  d/c remdesevir given covid ab positive noted   completed dexamethasone   started pulse steroids for 3 days - 250mg solumedrol bid now tapered off 4/14/21   C/w prednisone 40 daily for possible organizing pneumonia   Started on bactrim empirically, TIW  cont asa, vit c,    cont albuterol inhaler   pulm f/u  procalcitonin, D-dimer, crp, ldh, ferritin, lactate noted ,    afebrile   cont tylenol prn,   cont robitussin prn   pt on precedex drip    pt on fentanyl patch  resume phenylephrine drip   cont midodrine    s/p intubation 3/29/21   s/p tracheostomy 4/21/21  O2 sat 98% (93% - 100%) mv 40%  O2 via mech vent and taper fio2 as tolerated   vent mgmt as per icu  xray 3/19/21 with pneumomediastinum  rept cxr with New trace right apical pneumothorax. New mild left apical pneumothorax. Grossly stable small pneumomediastinum.  Soft tissue emphysema at the neck bases bilaterally. Grossly stable bilateral pulmonary infiltrates noted.   cxr 2/24 with No evidence of pneumothorax can be appreciated on the available image. This may be related to patient positioning. Evidence of pneumomediastinum and subcutaneous emphysema in the lower neck is again noted. There are patchy bibasilar infiltrates and elevated right hemidiaphragm noted.   cxr 3/29/21 with No significant change bilateral infiltrates. There is a small simple left apical pneumothorax. No significant pleural effusion. Bilateral subcutaneous emphysema similar to prior.   XRs on 7AM and 5PM with no obvious increase of ptx  cxr 4/4/21 with Improving bilateral airspace disease noted    cxr 4/8/21 with Small left pneumothorax noted.  thoracic surg f/u   s/p L chest tube replacement 4/18/21 for recurrence of left pneumothorax   cxr 4/20/21 with Unchanged advanced infiltrates and catheter left chest tube noted   CXR 4/11/21 with : No interval change compared to one day prior. No pneumothorax noted.   unable to flush or aspirate tube fully, noted debris in tubing which was milked out.   Once material removed from tubing able to aspirated and flush fully. Also changed dressing on pigtail which appears to be in good position.   Monitor O2 status   s/p tracheostomy 4/21/21   stay sutures out 2 weeks from OR date  cxr 4/21/21 with No evidence of active chest disease. Tracheostomy tube in place otherwise no significant change noted   cxr 4/25/21 with A 40% LEFT pneumothorax. LEFT multi-sidehole pigtail catheter overlies LEFT lower hemithorax.. Bilateral multifocal and diffuse ill-defined airspace opacities..  Follow-up AP portable chest radiograph 4/25/2021 AT 8:58 AM: Residual LEFT 30% upper zone pneumothorax. Otherwise no interval change.noted    cxr 4/30/21 Tracheostomy tube again noted. Left chest tube noted with small left apical pneumothorax unchanged. Bilateral airspace opacities overall worsening. Heart size cannot be accurately assessed in this projection noted.   cxr 5/3/21 with Large left pneumothorax noted above.   cxr 5 4/21 with No significant interval change noted above.   ct chest with Extensive chronic appearing consolidative opacities throughout the lungs, most prominent in the left lower lobe and lingula, with bronchiectasis, scarring, and volume loss. Additional scattered peripheral coarse opacities.   Mild left pneumothorax. Left lateral pleural space pigtail catheter, not in continuity with the pneumothorax. Mild bilateral hydronephrosis, similar to appearance on CT of the abdomen and pelvis on April 27. noted above   s/p 2nd chest tube 5/5/21  cxr 5/6/21 with Tracheostomy and catheter left chest tubes remain. , There are significant diffuse advanced infiltrates again noted. No pneumothorax. Above findings are similar to study earlier in the day. Present film shows a left jugular line inserted   with tip entering the superior vena cava noted above.  s/p surgical placement of gastrostomy tube 4/23/2021.   cont on tube feeding  id f/u   No need for further antibiotics as patient completed course of Meropenem  leukocytosis resolved  speutum cx with Enterobacter aerogenes (Carbapenem Resistant) noted above  andrea   lispro ss   prognosis poor   s/p 4 units prbc for anemia  f/u h/h    transfuse prbc as needed  check vitamin b12  heme onc f/u  retic is elevated.    Haptoglobin normal  ? daily phlebotomy  no hemolysis, Fe/B12/folate adequate  hemolysis is unlikely even his baseline haptoglobin may be very elevated due to COVID  direct pamella neg noted    COVID is known to cause cold agglutinin  cont current meds  mgmt as per icu

## 2021-05-11 NOTE — PROGRESS NOTE ADULT - SUBJECTIVE AND OBJECTIVE BOX
INTERVAL HPI/OVERNIGHT EVENTS: Pt agitated/tachy o/n. Seroquel given with good effect.     PRESSORS: [x] YES [] NO     WHICH:pheny    Antimicrobial:  trimethoprim  160 mG/sulfamethoxazole 800 mG 1 Tablet(s) Oral <User Schedule>    Cardiovascular:  furosemide   Injectable 40 milliGRAM(s) IV Push every 12 hours  midodrine 10 milliGRAM(s) Oral every 8 hours  phenylephrine    Infusion 1 MICROgram(s)/kG/Min IV Continuous <Continuous>  tamsulosin 0.4 milliGRAM(s) Oral at bedtime    Pulmonary:  ALBUTerol    90 MICROgram(s) HFA Inhaler 2 Puff(s) Inhalation every 6 hours PRN    Hematalogic:  enoxaparin Injectable 40 milliGRAM(s) SubCutaneous every 24 hours    Other:  acetaminophen    Suspension .. 650 milliGRAM(s) Oral every 12 hours PRN  artificial  tears Solution 1 Drop(s) Both EYES every 4 hours PRN  ascorbic acid 1000 milliGRAM(s) Oral daily  BACItracin   Ointment 1 Application(s) Topical two times a day  bisacodyl Suppository 10 milliGRAM(s) Rectal daily  chlorhexidine 0.12% Liquid 15 milliLiter(s) Oral Mucosa every 12 hours  chlorhexidine 2% Cloths 1 Application(s) Topical <User Schedule>  clonazePAM  Tablet 2 milliGRAM(s) Oral every 8 hours  dexMEDEtomidine Infusion 0.2 MICROgram(s)/kG/Hr IV Continuous <Continuous>  insulin lispro (ADMELOG) corrective regimen sliding scale   SubCutaneous every 6 hours  LORazepam   Injectable 2 milliGRAM(s) IV Push every 6 hours PRN  pantoprazole  Injectable 40 milliGRAM(s) IV Push every 12 hours  polyethylene glycol 3350 17 Gram(s) Oral two times a day  potassium chloride   Powder 40 milliEquivalent(s) Oral once  predniSONE   Tablet 40 milliGRAM(s) Oral daily  QUEtiapine 50 milliGRAM(s) Oral at bedtime  sodium chloride 0.9% lock flush 10 milliLiter(s) IV Push every 1 hour PRN  sodium chloride 0.9% lock flush 10 milliLiter(s) IV Push every 1 hour PRN    acetaminophen    Suspension .. 650 milliGRAM(s) Oral every 12 hours PRN  ALBUTerol    90 MICROgram(s) HFA Inhaler 2 Puff(s) Inhalation every 6 hours PRN  artificial  tears Solution 1 Drop(s) Both EYES every 4 hours PRN  ascorbic acid 1000 milliGRAM(s) Oral daily  BACItracin   Ointment 1 Application(s) Topical two times a day  bisacodyl Suppository 10 milliGRAM(s) Rectal daily  chlorhexidine 0.12% Liquid 15 milliLiter(s) Oral Mucosa every 12 hours  chlorhexidine 2% Cloths 1 Application(s) Topical <User Schedule>  clonazePAM  Tablet 2 milliGRAM(s) Oral every 8 hours  dexMEDEtomidine Infusion 0.2 MICROgram(s)/kG/Hr IV Continuous <Continuous>  enoxaparin Injectable 40 milliGRAM(s) SubCutaneous every 24 hours  furosemide   Injectable 40 milliGRAM(s) IV Push every 12 hours  insulin lispro (ADMELOG) corrective regimen sliding scale   SubCutaneous every 6 hours  LORazepam   Injectable 2 milliGRAM(s) IV Push every 6 hours PRN  midodrine 10 milliGRAM(s) Oral every 8 hours  pantoprazole  Injectable 40 milliGRAM(s) IV Push every 12 hours  phenylephrine    Infusion 1 MICROgram(s)/kG/Min IV Continuous <Continuous>  polyethylene glycol 3350 17 Gram(s) Oral two times a day  potassium chloride   Powder 40 milliEquivalent(s) Oral once  predniSONE   Tablet 40 milliGRAM(s) Oral daily  QUEtiapine 50 milliGRAM(s) Oral at bedtime  sodium chloride 0.9% lock flush 10 milliLiter(s) IV Push every 1 hour PRN  sodium chloride 0.9% lock flush 10 milliLiter(s) IV Push every 1 hour PRN  tamsulosin 0.4 milliGRAM(s) Oral at bedtime  trimethoprim  160 mG/sulfamethoxazole 800 mG 1 Tablet(s) Oral <User Schedule>    Drug Dosing Weight  Height (cm): 167.6 (2021 23:15)  Weight (kg): 83.9 (2021 23:15)  BMI (kg/m2): 29.9 (2021 23:15)  BSA (m2): 1.93 (2021 23:15)    CENTRAL LINE: [x] YES [] NO  LOCATION:  [facility] DATE INSERTED:  REMOVE: [] YES [] NO  EXPLAIN:    RIVERA: [x] YES [] NO    DATE INSERTED:  REMOVE:  [] YES [] NO  EXPLAIN: Strict IOs    A-LINE:  [] YES [x] NO  LOCATION:   DATE INSERTED:  REMOVE:  [] YES [] NO  EXPLAIN:    PMH -reviewed admission note, no change since admission  PAST MEDICAL & SURGICAL HISTORY:  No pertinent past medical history    S/P moody      S/P appendectomy          ICU Vital Signs Last 24 Hrs  T(C): 37.1 (11 May 2021 04:53), Max: 37.5 (10 May 2021 15:55)  T(F): 98.8 (11 May 2021 04:53), Max: 99.5 (10 May 2021 15:55)  HR: 105 (11 May 2021 07:15) (70 - 153)  BP: 100/64 (11 May 2021 07:15) (77/12 - 184/105)  BP(mean): 73 (11 May 2021 07:15) (28 - 117)  ABP: --  ABP(mean): --  RR: 29 (11 May 2021 07:15) (18 - 40)  SpO2: 98% (11 May 2021 06:30) (93% - 100%)      ABG - ( 11 May 2021 04:20 )  pH, Arterial: 7.42  pH, Blood: x     /  pCO2: 56    /  pO2: 146   / HCO3: 36    / Base Excess: 9.7   /  SaO2: 100                   05-10 @ 07:01  -  05-11 @ 07:00  --------------------------------------------------------  IN: 2856.8 mL / OUT: 1935 mL / NET: 921.8 mL        Mode: AC/ CMV (Assist Control/ Continuous Mandatory Ventilation)  RR (machine): 14  TV (machine): 400  FiO2: 40  PEEP: 5  ITime: 1  MAP: 10  PIP: 35      REVIEW OF SYSTEMS:    Unable to complete       PHYSICAL EXAM:    GENERAL: NAD, intermittently restless  HEAD:  Atraumatic, Normocephalic  EYES: EOMI, PERRL, conjunctiva and sclera clear  NECK: Supple, normal appearance, No JVD; Normal thyroid; Trachea midline  NERVOUS SYSTEM:  Awake and alert, Moving all 4 extremities  CHEST/LUNx chest tube on L. No chest deformity; Lungs clear to auscultation b/l. No rales, rhonchi, wheezing   HEART: Tachycardic Regular rhythm; No murmurs, rubs, or gallops  ABDOMEN: PEG in place. Soft, Nontender, Nondistended; Bowel sounds present  EXTREMITIES:  Mild dependent edema. R arm edema and ecchymosis.  Peripheral Pulses intact, No clubbing, cyanosis,   SKIN: Intact, No rashes or lesions    LABS:  CBC Full  -  ( 11 May 2021 03:48 )  WBC Count : 12.86 K/uL  RBC Count : 2.74 M/uL  Hemoglobin : 9.5 g/dL  Hematocrit : 28.7 %  Platelet Count - Automated : 403 K/uL  Mean Cell Volume : 104.7 fl  Mean Cell Hemoglobin : 34.7 pg  Mean Cell Hemoglobin Concentration : 33.1 gm/dL  Auto Neutrophil # : x  Auto Lymphocyte # : x  Auto Monocyte # : x  Auto Eosinophil # : x  Auto Basophil # : x  Auto Neutrophil % : x  Auto Lymphocyte % : x  Auto Monocyte % : x  Auto Eosinophil % : x  Auto Basophil % : x        141  |  101  |  21<H>  ----------------------------<  142<H>  3.4<L>   |  35<H>  |  0.34<L>    Ca    8.6      11 May 2021 03:48  Phos  2.6       Mg     2.3         TPro  7.5  /  Alb  3.1<L>  /  TBili  0.8  /  DBili  x   /  AST  19  /  ALT  26  /  AlkPhos  97              RADIOLOGY & ADDITIONAL STUDIES REVIEWED:  ***      GOALS OF CARE DISCUSSION WITH PATIENT/FAMILY/PROXY:     INTERVAL HPI/OVERNIGHT EVENTS: Pt agitated/tachy o/n. Seroquel given with good effect. Increase seroquel to BID. Restart methadone for concern for opiate withdrawal. Increase midodrine to 20q8 to wean off of pressors. Abd xray showing ileus - will keep NPO, add reglan. Decrease Lasix 40IV daily now and prednisone to 30mg QD.     PRESSORS: [x] YES [] NO     WHICH:pheny    Antimicrobial:  trimethoprim  160 mG/sulfamethoxazole 800 mG 1 Tablet(s) Oral <User Schedule>    Cardiovascular:  furosemide   Injectable 40 milliGRAM(s) IV Push every 12 hours  midodrine 10 milliGRAM(s) Oral every 8 hours  phenylephrine    Infusion 1 MICROgram(s)/kG/Min IV Continuous <Continuous>  tamsulosin 0.4 milliGRAM(s) Oral at bedtime    Pulmonary:  ALBUTerol    90 MICROgram(s) HFA Inhaler 2 Puff(s) Inhalation every 6 hours PRN    Hematalogic:  enoxaparin Injectable 40 milliGRAM(s) SubCutaneous every 24 hours    Other:  acetaminophen    Suspension .. 650 milliGRAM(s) Oral every 12 hours PRN  artificial  tears Solution 1 Drop(s) Both EYES every 4 hours PRN  ascorbic acid 1000 milliGRAM(s) Oral daily  BACItracin   Ointment 1 Application(s) Topical two times a day  bisacodyl Suppository 10 milliGRAM(s) Rectal daily  chlorhexidine 0.12% Liquid 15 milliLiter(s) Oral Mucosa every 12 hours  chlorhexidine 2% Cloths 1 Application(s) Topical <User Schedule>  clonazePAM  Tablet 2 milliGRAM(s) Oral every 8 hours  dexMEDEtomidine Infusion 0.2 MICROgram(s)/kG/Hr IV Continuous <Continuous>  insulin lispro (ADMELOG) corrective regimen sliding scale   SubCutaneous every 6 hours  LORazepam   Injectable 2 milliGRAM(s) IV Push every 6 hours PRN  pantoprazole  Injectable 40 milliGRAM(s) IV Push every 12 hours  polyethylene glycol 3350 17 Gram(s) Oral two times a day  potassium chloride   Powder 40 milliEquivalent(s) Oral once  predniSONE   Tablet 40 milliGRAM(s) Oral daily  QUEtiapine 50 milliGRAM(s) Oral at bedtime  sodium chloride 0.9% lock flush 10 milliLiter(s) IV Push every 1 hour PRN  sodium chloride 0.9% lock flush 10 milliLiter(s) IV Push every 1 hour PRN    acetaminophen    Suspension .. 650 milliGRAM(s) Oral every 12 hours PRN  ALBUTerol    90 MICROgram(s) HFA Inhaler 2 Puff(s) Inhalation every 6 hours PRN  artificial  tears Solution 1 Drop(s) Both EYES every 4 hours PRN  ascorbic acid 1000 milliGRAM(s) Oral daily  BACItracin   Ointment 1 Application(s) Topical two times a day  bisacodyl Suppository 10 milliGRAM(s) Rectal daily  chlorhexidine 0.12% Liquid 15 milliLiter(s) Oral Mucosa every 12 hours  chlorhexidine 2% Cloths 1 Application(s) Topical <User Schedule>  clonazePAM  Tablet 2 milliGRAM(s) Oral every 8 hours  dexMEDEtomidine Infusion 0.2 MICROgram(s)/kG/Hr IV Continuous <Continuous>  enoxaparin Injectable 40 milliGRAM(s) SubCutaneous every 24 hours  furosemide   Injectable 40 milliGRAM(s) IV Push every 12 hours  insulin lispro (ADMELOG) corrective regimen sliding scale   SubCutaneous every 6 hours  LORazepam   Injectable 2 milliGRAM(s) IV Push every 6 hours PRN  midodrine 10 milliGRAM(s) Oral every 8 hours  pantoprazole  Injectable 40 milliGRAM(s) IV Push every 12 hours  phenylephrine    Infusion 1 MICROgram(s)/kG/Min IV Continuous <Continuous>  polyethylene glycol 3350 17 Gram(s) Oral two times a day  potassium chloride   Powder 40 milliEquivalent(s) Oral once  predniSONE   Tablet 40 milliGRAM(s) Oral daily  QUEtiapine 50 milliGRAM(s) Oral at bedtime  sodium chloride 0.9% lock flush 10 milliLiter(s) IV Push every 1 hour PRN  sodium chloride 0.9% lock flush 10 milliLiter(s) IV Push every 1 hour PRN  tamsulosin 0.4 milliGRAM(s) Oral at bedtime  trimethoprim  160 mG/sulfamethoxazole 800 mG 1 Tablet(s) Oral <User Schedule>    Drug Dosing Weight  Height (cm): 167.6 (2021 23:15)  Weight (kg): 83.9 (2021 23:15)  BMI (kg/m2): 29.9 (2021 23:15)  BSA (m2): 1.93 (2021 23:15)    CENTRAL LINE: [x] YES [] NO  LOCATION:  [facility] DATE INSERTED:  REMOVE: [] YES [] NO  EXPLAIN:    RIEVRA: [x] YES [] NO    DATE INSERTED:  REMOVE:  [] YES [] NO  EXPLAIN: Strict IOs    A-LINE:  [] YES [x] NO  LOCATION:   DATE INSERTED:  REMOVE:  [] YES [] NO  EXPLAIN:    PMH -reviewed admission note, no change since admission  PAST MEDICAL & SURGICAL HISTORY:  No pertinent past medical history    S/P moody      S/P appendectomy          ICU Vital Signs Last 24 Hrs  T(C): 37.1 (11 May 2021 04:53), Max: 37.5 (10 May 2021 15:55)  T(F): 98.8 (11 May 2021 04:53), Max: 99.5 (10 May 2021 15:55)  HR: 105 (11 May 2021 07:15) (70 - 153)  BP: 100/64 (11 May 2021 07:15) (77/12 - 184/105)  BP(mean): 73 (11 May 2021 07:15) (28 - 117)  ABP: --  ABP(mean): --  RR: 29 (11 May 2021 07:15) (18 - 40)  SpO2: 98% (11 May 2021 06:30) (93% - 100%)      ABG - ( 11 May 2021 04:20 )  pH, Arterial: 7.42  pH, Blood: x     /  pCO2: 56    /  pO2: 146   / HCO3: 36    / Base Excess: 9.7   /  SaO2: 100                   05-10 @ 07:01  -  -11 @ 07:00  --------------------------------------------------------  IN: 2856.8 mL / OUT: 1935 mL / NET: 921.8 mL        Mode: AC/ CMV (Assist Control/ Continuous Mandatory Ventilation)  RR (machine): 14  TV (machine): 400  FiO2: 40  PEEP: 5  ITime: 1  MAP: 10  PIP: 35      REVIEW OF SYSTEMS:    Unable to complete       PHYSICAL EXAM:    GENERAL: NAD, intermittently restless  HEAD:  Atraumatic, Normocephalic  EYES: EOMI, PERRL, conjunctiva and sclera clear  NECK: Supple, normal appearance, No JVD; Normal thyroid; Trachea midline  NERVOUS SYSTEM:  Awake and alert, Moving all 4 extremities  CHEST/LUNx chest tube on L. No chest deformity; Lungs clear to auscultation b/l. No rales, rhonchi, wheezing   HEART: Tachycardic Regular rhythm; No murmurs, rubs, or gallops  ABDOMEN: PEG in place. Soft, Nontender, Nondistended; Bowel sounds present  EXTREMITIES:  Mild dependent edema. R arm edema and ecchymosis.  Peripheral Pulses intact, No clubbing, cyanosis,   SKIN: Intact, No rashes or lesions    LABS:  CBC Full  -  ( 11 May 2021 03:48 )  WBC Count : 12.86 K/uL  RBC Count : 2.74 M/uL  Hemoglobin : 9.5 g/dL  Hematocrit : 28.7 %  Platelet Count - Automated : 403 K/uL  Mean Cell Volume : 104.7 fl  Mean Cell Hemoglobin : 34.7 pg  Mean Cell Hemoglobin Concentration : 33.1 gm/dL  Auto Neutrophil # : x  Auto Lymphocyte # : x  Auto Monocyte # : x  Auto Eosinophil # : x  Auto Basophil # : x  Auto Neutrophil % : x  Auto Lymphocyte % : x  Auto Monocyte % : x  Auto Eosinophil % : x  Auto Basophil % : x        141  |  101  |  21<H>  ----------------------------<  142<H>  3.4<L>   |  35<H>  |  0.34<L>    Ca    8.6      11 May 2021 03:48  Phos  2.6       Mg     2.3         TPro  7.5  /  Alb  3.1<L>  /  TBili  0.8  /  DBili  x   /  AST  19  /  ALT  26  /  AlkPhos  97              RADIOLOGY & ADDITIONAL STUDIES REVIEWED:  ***      GOALS OF CARE DISCUSSION WITH PATIENT/FAMILY/PROXY:     INTERVAL HPI/OVERNIGHT EVENTS: Pt agitated/tachy o/n. Seroquel given with good effect. Increase seroquel to BID. Restart methadone for concern for opiate withdrawal. Increase midodrine to 20q8 to wean off of pressors. Abd xray showing ileus - will keep NPO, add reglan. Decrease Lasix 40IV daily now and prednisone to 30mg QD.     PRESSORS: [x] YES [] NO     WHICH:pheny    Antimicrobial:  trimethoprim  160 mG/sulfamethoxazole 800 mG 1 Tablet(s) Oral <User Schedule>    Cardiovascular:  furosemide   Injectable 40 milliGRAM(s) IV Push every 12 hours  midodrine 10 milliGRAM(s) Oral every 8 hours  phenylephrine    Infusion 1 MICROgram(s)/kG/Min IV Continuous <Continuous>  tamsulosin 0.4 milliGRAM(s) Oral at bedtime    Pulmonary:  ALBUTerol    90 MICROgram(s) HFA Inhaler 2 Puff(s) Inhalation every 6 hours PRN    Hematalogic:  enoxaparin Injectable 40 milliGRAM(s) SubCutaneous every 24 hours    Other:  acetaminophen    Suspension .. 650 milliGRAM(s) Oral every 12 hours PRN  artificial  tears Solution 1 Drop(s) Both EYES every 4 hours PRN  ascorbic acid 1000 milliGRAM(s) Oral daily  BACItracin   Ointment 1 Application(s) Topical two times a day  bisacodyl Suppository 10 milliGRAM(s) Rectal daily  chlorhexidine 0.12% Liquid 15 milliLiter(s) Oral Mucosa every 12 hours  chlorhexidine 2% Cloths 1 Application(s) Topical <User Schedule>  clonazePAM  Tablet 2 milliGRAM(s) Oral every 8 hours  dexMEDEtomidine Infusion 0.2 MICROgram(s)/kG/Hr IV Continuous <Continuous>  insulin lispro (ADMELOG) corrective regimen sliding scale   SubCutaneous every 6 hours  LORazepam   Injectable 2 milliGRAM(s) IV Push every 6 hours PRN  pantoprazole  Injectable 40 milliGRAM(s) IV Push every 12 hours  polyethylene glycol 3350 17 Gram(s) Oral two times a day  potassium chloride   Powder 40 milliEquivalent(s) Oral once  predniSONE   Tablet 40 milliGRAM(s) Oral daily  QUEtiapine 50 milliGRAM(s) Oral at bedtime  sodium chloride 0.9% lock flush 10 milliLiter(s) IV Push every 1 hour PRN  sodium chloride 0.9% lock flush 10 milliLiter(s) IV Push every 1 hour PRN    acetaminophen    Suspension .. 650 milliGRAM(s) Oral every 12 hours PRN  ALBUTerol    90 MICROgram(s) HFA Inhaler 2 Puff(s) Inhalation every 6 hours PRN  artificial  tears Solution 1 Drop(s) Both EYES every 4 hours PRN  ascorbic acid 1000 milliGRAM(s) Oral daily  BACItracin   Ointment 1 Application(s) Topical two times a day  bisacodyl Suppository 10 milliGRAM(s) Rectal daily  chlorhexidine 0.12% Liquid 15 milliLiter(s) Oral Mucosa every 12 hours  chlorhexidine 2% Cloths 1 Application(s) Topical <User Schedule>  clonazePAM  Tablet 2 milliGRAM(s) Oral every 8 hours  dexMEDEtomidine Infusion 0.2 MICROgram(s)/kG/Hr IV Continuous <Continuous>  enoxaparin Injectable 40 milliGRAM(s) SubCutaneous every 24 hours  furosemide   Injectable 40 milliGRAM(s) IV Push every 12 hours  insulin lispro (ADMELOG) corrective regimen sliding scale   SubCutaneous every 6 hours  LORazepam   Injectable 2 milliGRAM(s) IV Push every 6 hours PRN  midodrine 10 milliGRAM(s) Oral every 8 hours  pantoprazole  Injectable 40 milliGRAM(s) IV Push every 12 hours  phenylephrine    Infusion 1 MICROgram(s)/kG/Min IV Continuous <Continuous>  polyethylene glycol 3350 17 Gram(s) Oral two times a day  potassium chloride   Powder 40 milliEquivalent(s) Oral once  predniSONE   Tablet 40 milliGRAM(s) Oral daily  QUEtiapine 50 milliGRAM(s) Oral at bedtime  sodium chloride 0.9% lock flush 10 milliLiter(s) IV Push every 1 hour PRN  sodium chloride 0.9% lock flush 10 milliLiter(s) IV Push every 1 hour PRN  tamsulosin 0.4 milliGRAM(s) Oral at bedtime  trimethoprim  160 mG/sulfamethoxazole 800 mG 1 Tablet(s) Oral <User Schedule>    Drug Dosing Weight  Height (cm): 167.6 (2021 23:15)  Weight (kg): 83.9 (2021 23:15)  BMI (kg/m2): 29.9 (2021 23:15)  BSA (m2): 1.93 (2021 23:15)    CENTRAL LINE: [x] YES [] NO  LOCATION:  [facility] DATE INSERTED:  REMOVE: [] YES [] NO  EXPLAIN:    RIVERA: [x] YES [] NO    DATE INSERTED:  REMOVE:  [] YES [] NO  EXPLAIN: Strict IOs    A-LINE:  [] YES [x] NO  LOCATION:   DATE INSERTED:  REMOVE:  [] YES [] NO  EXPLAIN:    PMH -reviewed admission note, no change since admission  PAST MEDICAL & SURGICAL HISTORY:  No pertinent past medical history    S/P moody      S/P appendectomy          ICU Vital Signs Last 24 Hrs  T(C): 37.1 (11 May 2021 04:53), Max: 37.5 (10 May 2021 15:55)  T(F): 98.8 (11 May 2021 04:53), Max: 99.5 (10 May 2021 15:55)  HR: 105 (11 May 2021 07:15) (70 - 153)  BP: 100/64 (11 May 2021 07:15) (77/12 - 184/105)  BP(mean): 73 (11 May 2021 07:15) (28 - 117)  ABP: --  ABP(mean): --  RR: 29 (11 May 2021 07:15) (18 - 40)  SpO2: 98% (11 May 2021 06:30) (93% - 100%)      ABG - ( 11 May 2021 04:20 )  pH, Arterial: 7.42  pH, Blood: x     /  pCO2: 56    /  pO2: 146   / HCO3: 36    / Base Excess: 9.7   /  SaO2: 100                   05-10 @ 07:01  -  -11 @ 07:00  --------------------------------------------------------  IN: 2856.8 mL / OUT: 1935 mL / NET: 921.8 mL        Mode: AC/ CMV (Assist Control/ Continuous Mandatory Ventilation)  RR (machine): 14  TV (machine): 400  FiO2: 40  PEEP: 5  ITime: 1  MAP: 10  PIP: 35      REVIEW OF SYSTEMS:    Unable to complete       PHYSICAL EXAM:    GENERAL: NAD, intermittently restless  HEAD:  Atraumatic, Normocephalic  EYES: EOMI, PERRL, conjunctiva and sclera clear  NECK: Supple, normal appearance, No JVD; Normal thyroid; Trachea midline + trach  NERVOUS SYSTEM:  Awake + encephalopathy  Moving all 4 extremities  CHEST/LUNx chest tube on L. No chest deformity; Lungs clear to auscultation b/l. No rales, rhonchi, wheezing   HEART: Tachycardic Regular rhythm; No murmurs, rubs, or gallops  ABDOMEN: PEG in place. Soft, Nontender, Nondistended; Bowel sounds present + PEG  EXTREMITIES:  Mild dependent edema. R arm edema and ecchymosis.  Peripheral Pulses intact, No clubbing, cyanosis,   SKIN: Intact, No rashes or lesions    LABS:  CBC Full  -  ( 11 May 2021 03:48 )  WBC Count : 12.86 K/uL  RBC Count : 2.74 M/uL  Hemoglobin : 9.5 g/dL  Hematocrit : 28.7 %  Platelet Count - Automated : 403 K/uL  Mean Cell Volume : 104.7 fl  Mean Cell Hemoglobin : 34.7 pg  Mean Cell Hemoglobin Concentration : 33.1 gm/dL  Auto Neutrophil # : x  Auto Lymphocyte # : x  Auto Monocyte # : x  Auto Eosinophil # : x  Auto Basophil # : x  Auto Neutrophil % : x  Auto Lymphocyte % : x  Auto Monocyte % : x  Auto Eosinophil % : x  Auto Basophil % : x        141  |  101  |  21<H>  ----------------------------<  142<H>  3.4<L>   |  35<H>  |  0.34<L>    Ca    8.6      11 May 2021 03:48  Phos  2.6       Mg     2.3         TPro  7.5  /  Alb  3.1<L>  /  TBili  0.8  /  DBili  x   /  AST  19  /  ALT  26  /  AlkPhos  97              RADIOLOGY & ADDITIONAL STUDIES REVIEWED:  ***      GOALS OF CARE DISCUSSION WITH PATIENT/FAMILY/PROXY:

## 2021-05-11 NOTE — PROGRESS NOTE ADULT - ASSESSMENT
Assessment and plan: 54 y/o M with no PMH is admitted to ICU for AHRF 2/2 covid19 pna     1. Acute hypoxic respiratory failure  2. ARDS 2/2 Covid pneumonia  3. Pneumothorax  4. Prediabetes  5. Abnormal TSH     Neuro  -Alert and oriented x 3 at baseline   -Sedated on trach  -Continue Precedex   -DC Methadone, zyprexa  -Continue Klonopin  -PM Seroquel added  -CT head unremarkable     Cardiovascular  # Hypotension   On phenylephrine  C/w midodrine to 10mg tid    # TACHYCARDIA: recurrent episodes   -HR in 110's  -Ativan PRN for agitation  -Continue monitoring      Pulm  #Acute hypoxic respiratory failure: secondary to Covid19 pneumonia  #ARDS  -Was on HFNC then got intubated 3/29  -Trach 4/22 continues on Vent   - Remdesivir was discontinued due to positive antibodies   - Finished Dexamethasone   - Covid19 PCR negative now, off isolation   - Daily SBT    # Bacterial Pneumonia  - Stable infiltrate on CXR ,likely developed Bacterial pneumonia   - Completed ABx Zosyn, meropenem, s/p 1 dose of Vancomycin  - MRSA negative from 3/26  - Sputum Cx growing few gram negative rods, CRE - Contact isolation  - C/w prednisone 40 daily for possible organizing pneumonia   - Started on bactrim empirically, TIW for PCP PPX  - Secretions from the trach, needs frequent suctions   - Pulm. Dr. Ryan  - ID Dr Anand       #Pneumothorax and pneumomediastinum:   -Thoracic surgery following  -s/p Chest tube placed 4/8 pigtail to wall suction and d/c on 4/15  -On 4/18 chest tube replaced for recurrence of PTX- c/w chest tube to suction     -anterior chest tube placed 5/6 for worsening pneumothorax with resolution of PTX  5/10 CXR with recurrence of apical pneumo - tube adjusted with improvement      ID  Likely bacterial pneumonia   -leukocytosis uptrend WBC 12k  -No more fever, On Tylenol PRN only   -Completed ABX course  -plan as above       Nephro  -Pitting edema to both arms, ecchymosis on right AC, blisters on left arm around brachial area    -Was retaining urine, andrea was placed 5/5  -monitor I/Os   -Completed albumin  Metabolic alkalosis improving  c/w lasix 40 IV BID  C/w diamox 250 q12 x2days  C/w free water through PEG tube to increase PO hydration  AVOID IV FLUIDS    GI  Transaminitis:   likely secondary to Covid19, Resolved   hepatitis panel -ve  -continue to monitor LFT    Abd xray with large amount of stool - dulcolax added  Diet:   C/w bowel regimen   G tube 4/23, on tube feeds      Heme  Elevated d-dimer: likely secondary to Covid19   -D-dimer 423 on admission  -Last D-dimer 1434  - No active bleeding, restarted lovenox daily    Anemia  S/p 4 PRBC total  - Now Hg stable 7-9  CTH and CT abdomen w/o contrast no evidence of bleed    No active signs of bleeding (No hematemesis, melena or hematuria)  Hemolysis w/u- negative  Iron studies show AECD  Dr Andrade on board   F/u CBC daily     Endocrine  Prediabetes:  -A1c 5.8  -BS controlled  -continue HSS    Abnormal TSH:  -TSH level noted 0.26,   -Repeat TSH 0.37 and Free T4 1.82    Skin/Catheters  No rashes. Peripheral IV lines.   LIJ  Both arms with pitting edema, right AC with ecchymosis and left AC with blisters     Prophylaxis   On Lovenox for DVT   Protonix for GI proph    GOC  FULL CODE Assessment and plan: 56 y/o M with no PMH is admitted to ICU for AHRF 2/2 covid19 pna     1. Acute hypoxic respiratory failure  2. ARDS 2/2 Covid pneumonia  3. Pneumothorax  4. Prediabetes  5. Abnormal TSH     Neuro  -Alert and oriented x 3 at baseline   -Sedated on trach  -Continue Precedex   -Continue Klonopin  -Seroquel 50mg BID added  -Restart methadone for concern for opiate withdrawal  -Ativan and also fentanyl PRN  -CT head unremarkable     Cardiovascular  # Hypotension   On phenylephrine  Increase midodrine to 20mg tid    # TACHYCARDIA: recurrent episodes   -HR in 110's  -Ativan PRN for agitation  -Continue monitoring      Pulm  #Acute hypoxic respiratory failure: secondary to Covid19 pneumonia  #ARDS  -Was on HFNC then got intubated 3/29  -Trach 4/22 continues on Vent   - Remdesivir was discontinued due to positive antibodies   - Finished Dexamethasone   - Covid19 PCR negative now, off isolation   - Daily SBT, difficulty tolerating 2/2 to agitation    # Bacterial Pneumonia  - Stable infiltrate on CXR ,likely developed Bacterial pneumonia   - Completed ABx Zosyn, meropenem, s/p 1 dose of Vancomycin  - MRSA negative from 3/26  - Sputum Cx growing few gram negative rods, CRE - Contact isolation  - Taper prednisone to 30 daily (5/11) for possible organizing pneumonia   - C/w bactrim empirically, TIW for PCP PPX  - Secretions from the trach, needs frequent suctions   - Pulm. Dr. Ryan  - ID Dr Anand       #Pneumothorax and pneumomediastinum:   -Thoracic surgery following  -s/p Chest tube placed 4/8 pigtail to wall suction and d/c on 4/15  -On 4/18 chest tube replaced for recurrence of PTX- c/w chest tube to suction     -anterior chest tube placed 5/6 for worsening pneumothorax with resolution of PTX  5/10 CXR with recurrence of apical pneumo - tube adjusted with improvement however still persisting    ID  Likely bacterial pneumonia   -leukocytosis uptrend WBC 12k  -No more fever, On Tylenol PRN only   -Completed ABX course  -plan as above       Nephro  -Pitting edema to both arms, ecchymosis on right AC, blisters on left arm around brachial area    -Was retaining urine, andrea was placed 5/5  -monitor I/Os   -Completed albumin  Metabolic alkalosis improving  Taper lasix 40 IV to QD  Completed diamox   C/w free water through PEG tube to increase PO hydration  AVOID IV FLUIDS    GI  Transaminitis:   likely secondary to Covid19, Resolved   hepatitis panel -ve  -continue to monitor LFT    Ileus  Abd xray with ileus  NPO - stop feeds for now  C/w Miralax BID standing - added dulcolax suppository and reglan   G tube 4/23,     Heme  Elevated d-dimer: likely secondary to Covid19   -D-dimer 423 on admission  -Last D-dimer 1434  - No active bleeding, restarted lovenox daily    Anemia  S/p 4 PRBC total  - Now Hg stable 7-9  CTH and CT abdomen w/o contrast no evidence of bleed    No active signs of bleeding (No hematemesis, melena or hematuria)  Hemolysis w/u- negative  Iron studies show AECD  Dr Andrade on board   F/u CBC daily     Endocrine  Prediabetes:  -A1c 5.8  -BS controlled  -continue HSS    Abnormal TSH:  -TSH level noted 0.26,   -Repeat TSH 0.37 and Free T4 1.82    Skin/Catheters  No rashes. Peripheral IV lines.   LIJ  Both arms with pitting edema, right AC with ecchymosis and left AC with blisters     Prophylaxis   On Lovenox for DVT   Protonix for GI proph    GOC  FULL CODE

## 2021-05-11 NOTE — PROGRESS NOTE ADULT - ASSESSMENT
54 yo M s/p left anterior pigtail chest tube 5/6 and left pigtail chest tube 4/18. S/p Tracheostomy 4/21    -F/u AM CXR   -C/w CTs to suction   -Dily cxrs  -Care as per ICU

## 2021-05-11 NOTE — PROGRESS NOTE ADULT - ATTENDING COMMENTS
55 yr old  man , non smoker with  moody 1990s presented 3/14 with x9 days worsening cough, subjective fevers, and SOB, with x2-3 days dysuria and central, non-radiating, constant CP. Admitted to medicine unit  for acute hypoxic respiratory failure secondary to pna from covid-19 infection .     Assessment:  1. Acute hypoxic respiratory failure  2. Covid-19 infection   3. Transaminitis  4. Prediabetes  5. Bilateral pneumothorax  6. Septic shock - resolved  7. Anemia    Plan   -Left apical chest tube manipulated, as patient with recurrence of left apical pneumothorax  - Repeat CXR  -Transfuse to hgb > 7  -S/p tracheostomy placement 4/21   -S/p G-tube 4/23  -Continue tube feedings  - Concern for possible ileus developing, f/u abdominal xray  - Hold all narcotics  -Cont. mechanical ventilation   -Daily weaning trials  -Chest tube to suction  -Cont. prednisone 40 mg for possible organizing pneumonia as evidence on CT scan  -Albumin and Lasix for diuresis, aim for negative fluid balance  -Start vasopressors if drops BP, cont. Midodrine  -Remains anxious when off Precedex, will stop all oral medications except for clonazepam and monitor response  -PT evaluation  -dvt/gi prophy  -hemodynamic monitoring   -Prognosis is guarded .

## 2021-05-12 LAB
ALBUMIN SERPL ELPH-MCNC: 2.8 G/DL — LOW (ref 3.5–5)
ALBUMIN SERPL ELPH-MCNC: 3.4 G/DL — LOW (ref 3.5–5)
ALP SERPL-CCNC: 81 U/L — SIGNIFICANT CHANGE UP (ref 40–120)
ALP SERPL-CCNC: 97 U/L — SIGNIFICANT CHANGE UP (ref 40–120)
ALT FLD-CCNC: 36 U/L DA — SIGNIFICANT CHANGE UP (ref 10–60)
ALT FLD-CCNC: 69 U/L DA — HIGH (ref 10–60)
ANION GAP SERPL CALC-SCNC: 3 MMOL/L — LOW (ref 5–17)
ANION GAP SERPL CALC-SCNC: 7 MMOL/L — SIGNIFICANT CHANGE UP (ref 5–17)
APPEARANCE UR: ABNORMAL
AST SERPL-CCNC: 29 U/L — SIGNIFICANT CHANGE UP (ref 10–40)
AST SERPL-CCNC: 49 U/L — HIGH (ref 10–40)
BACTERIA # UR AUTO: ABNORMAL /HPF
BASE EXCESS BLDA CALC-SCNC: 13.6 MMOL/L — HIGH (ref -2–3)
BILIRUB SERPL-MCNC: 1 MG/DL — SIGNIFICANT CHANGE UP (ref 0.2–1.2)
BILIRUB SERPL-MCNC: 1.2 MG/DL — SIGNIFICANT CHANGE UP (ref 0.2–1.2)
BILIRUB UR-MCNC: NEGATIVE — SIGNIFICANT CHANGE UP
BLOOD GAS COMMENTS ARTERIAL: SIGNIFICANT CHANGE UP
BUN SERPL-MCNC: 29 MG/DL — HIGH (ref 7–18)
BUN SERPL-MCNC: 38 MG/DL — HIGH (ref 7–18)
CALCIUM SERPL-MCNC: 9.1 MG/DL — SIGNIFICANT CHANGE UP (ref 8.4–10.5)
CALCIUM SERPL-MCNC: 9.2 MG/DL — SIGNIFICANT CHANGE UP (ref 8.4–10.5)
CHLORIDE SERPL-SCNC: 105 MMOL/L — SIGNIFICANT CHANGE UP (ref 96–108)
CHLORIDE SERPL-SCNC: 105 MMOL/L — SIGNIFICANT CHANGE UP (ref 96–108)
CK SERPL-CCNC: 21 U/L — LOW (ref 35–232)
CO2 SERPL-SCNC: 34 MMOL/L — HIGH (ref 22–31)
CO2 SERPL-SCNC: 35 MMOL/L — HIGH (ref 22–31)
COLOR SPEC: ABNORMAL
CREAT SERPL-MCNC: 0.33 MG/DL — LOW (ref 0.5–1.3)
CREAT SERPL-MCNC: 0.93 MG/DL — SIGNIFICANT CHANGE UP (ref 0.5–1.3)
DIFF PNL FLD: ABNORMAL
EPI CELLS # UR: SIGNIFICANT CHANGE UP /HPF
GLUCOSE BLDC GLUCOMTR-MCNC: 122 MG/DL — HIGH (ref 70–99)
GLUCOSE BLDC GLUCOMTR-MCNC: 157 MG/DL — HIGH (ref 70–99)
GLUCOSE BLDC GLUCOMTR-MCNC: 162 MG/DL — HIGH (ref 70–99)
GLUCOSE SERPL-MCNC: 106 MG/DL — HIGH (ref 70–99)
GLUCOSE SERPL-MCNC: 134 MG/DL — HIGH (ref 70–99)
GLUCOSE UR QL: NEGATIVE — SIGNIFICANT CHANGE UP
HCO3 BLDA-SCNC: 40 MMOL/L — HIGH (ref 21–28)
HCT VFR BLD CALC: 28.5 % — LOW (ref 39–50)
HGB BLD-MCNC: 9.1 G/DL — LOW (ref 13–17)
HOROWITZ INDEX BLDA+IHG-RTO: 40 — SIGNIFICANT CHANGE UP
KETONES UR-MCNC: ABNORMAL
LACTATE SERPL-SCNC: 1.4 MMOL/L — SIGNIFICANT CHANGE UP (ref 0.7–2)
LEUKOCYTE ESTERASE UR-ACNC: ABNORMAL
MAGNESIUM SERPL-MCNC: 2.4 MG/DL — SIGNIFICANT CHANGE UP (ref 1.6–2.6)
MCHC RBC-ENTMCNC: 31.9 GM/DL — LOW (ref 32–36)
MCHC RBC-ENTMCNC: 33.3 PG — SIGNIFICANT CHANGE UP (ref 27–34)
MCV RBC AUTO: 104.4 FL — HIGH (ref 80–100)
NITRITE UR-MCNC: NEGATIVE — SIGNIFICANT CHANGE UP
NRBC # BLD: 0 /100 WBCS — SIGNIFICANT CHANGE UP (ref 0–0)
PCO2 BLDA: 58 MMHG — HIGH (ref 35–48)
PH BLDA: 7.45 — SIGNIFICANT CHANGE UP (ref 7.35–7.45)
PH UR: 6.5 — SIGNIFICANT CHANGE UP (ref 5–8)
PHOSPHATE SERPL-MCNC: 2.2 MG/DL — LOW (ref 2.5–4.5)
PLATELET # BLD AUTO: 321 K/UL — SIGNIFICANT CHANGE UP (ref 150–400)
PO2 BLDA: 87 MMHG — SIGNIFICANT CHANGE UP (ref 83–108)
POTASSIUM SERPL-MCNC: 3.5 MMOL/L — SIGNIFICANT CHANGE UP (ref 3.5–5.3)
POTASSIUM SERPL-MCNC: 3.8 MMOL/L — SIGNIFICANT CHANGE UP (ref 3.5–5.3)
POTASSIUM SERPL-SCNC: 3.5 MMOL/L — SIGNIFICANT CHANGE UP (ref 3.5–5.3)
POTASSIUM SERPL-SCNC: 3.8 MMOL/L — SIGNIFICANT CHANGE UP (ref 3.5–5.3)
PROT SERPL-MCNC: 6.9 G/DL — SIGNIFICANT CHANGE UP (ref 6–8.3)
PROT SERPL-MCNC: 8.2 G/DL — SIGNIFICANT CHANGE UP (ref 6–8.3)
PROT UR-MCNC: 500 MG/DL
RBC # BLD: 2.73 M/UL — LOW (ref 4.2–5.8)
RBC # FLD: 17.2 % — HIGH (ref 10.3–14.5)
RBC CASTS # UR COMP ASSIST: ABNORMAL /HPF (ref 0–2)
SAO2 % BLDA: 98 % — SIGNIFICANT CHANGE UP
SODIUM SERPL-SCNC: 143 MMOL/L — SIGNIFICANT CHANGE UP (ref 135–145)
SODIUM SERPL-SCNC: 146 MMOL/L — HIGH (ref 135–145)
SP GR SPEC: 1.02 — SIGNIFICANT CHANGE UP (ref 1.01–1.02)
UROBILINOGEN FLD QL: 4
WBC # BLD: 10.27 K/UL — SIGNIFICANT CHANGE UP (ref 3.8–10.5)
WBC # FLD AUTO: 10.27 K/UL — SIGNIFICANT CHANGE UP (ref 3.8–10.5)
WBC UR QL: >50 /HPF (ref 0–5)

## 2021-05-12 PROCEDURE — 71045 X-RAY EXAM CHEST 1 VIEW: CPT | Mod: 26

## 2021-05-12 PROCEDURE — 99291 CRITICAL CARE FIRST HOUR: CPT

## 2021-05-12 RX ORDER — QUETIAPINE FUMARATE 200 MG/1
75 TABLET, FILM COATED ORAL
Refills: 0 | Status: DISCONTINUED | OUTPATIENT
Start: 2021-05-12 | End: 2021-05-24

## 2021-05-12 RX ORDER — HYDROMORPHONE HYDROCHLORIDE 2 MG/ML
1 INJECTION INTRAMUSCULAR; INTRAVENOUS; SUBCUTANEOUS ONCE
Refills: 0 | Status: DISCONTINUED | OUTPATIENT
Start: 2021-05-12 | End: 2021-05-12

## 2021-05-12 RX ORDER — PIPERACILLIN AND TAZOBACTAM 4; .5 G/20ML; G/20ML
3.38 INJECTION, POWDER, LYOPHILIZED, FOR SOLUTION INTRAVENOUS ONCE
Refills: 0 | Status: COMPLETED | OUTPATIENT
Start: 2021-05-12 | End: 2021-05-12

## 2021-05-12 RX ORDER — CLONAZEPAM 1 MG
3 TABLET ORAL EVERY 8 HOURS
Refills: 0 | Status: DISCONTINUED | OUTPATIENT
Start: 2021-05-12 | End: 2021-05-19

## 2021-05-12 RX ORDER — ONDANSETRON 8 MG/1
4 TABLET, FILM COATED ORAL ONCE
Refills: 0 | Status: COMPLETED | OUTPATIENT
Start: 2021-05-12 | End: 2021-05-12

## 2021-05-12 RX ORDER — QUETIAPINE FUMARATE 200 MG/1
25 TABLET, FILM COATED ORAL ONCE
Refills: 0 | Status: COMPLETED | OUTPATIENT
Start: 2021-05-12 | End: 2021-05-12

## 2021-05-12 RX ORDER — FENTANYL CITRATE 50 UG/ML
25 INJECTION INTRAVENOUS EVERY 6 HOURS
Refills: 0 | Status: DISCONTINUED | OUTPATIENT
Start: 2021-05-12 | End: 2021-05-12

## 2021-05-12 RX ORDER — VANCOMYCIN HCL 1 G
1250 VIAL (EA) INTRAVENOUS ONCE
Refills: 0 | Status: COMPLETED | OUTPATIENT
Start: 2021-05-12 | End: 2021-05-12

## 2021-05-12 RX ORDER — METHADONE HYDROCHLORIDE 40 MG/1
30 TABLET ORAL EVERY 8 HOURS
Refills: 0 | Status: DISCONTINUED | OUTPATIENT
Start: 2021-05-12 | End: 2021-05-14

## 2021-05-12 RX ORDER — CLONAZEPAM 1 MG
1 TABLET ORAL ONCE
Refills: 0 | Status: DISCONTINUED | OUTPATIENT
Start: 2021-05-12 | End: 2021-05-12

## 2021-05-12 RX ORDER — METHADONE HYDROCHLORIDE 40 MG/1
10 TABLET ORAL EVERY 8 HOURS
Refills: 0 | Status: DISCONTINUED | OUTPATIENT
Start: 2021-05-12 | End: 2021-05-12

## 2021-05-12 RX ORDER — LACTULOSE 10 G/15ML
10 SOLUTION ORAL DAILY
Refills: 0 | Status: DISCONTINUED | OUTPATIENT
Start: 2021-05-12 | End: 2021-05-12

## 2021-05-12 RX ORDER — FENTANYL CITRATE 50 UG/ML
50 INJECTION INTRAVENOUS EVERY 6 HOURS
Refills: 0 | Status: DISCONTINUED | OUTPATIENT
Start: 2021-05-12 | End: 2021-05-19

## 2021-05-12 RX ORDER — METOPROLOL TARTRATE 50 MG
2.5 TABLET ORAL ONCE
Refills: 0 | Status: COMPLETED | OUTPATIENT
Start: 2021-05-12 | End: 2021-05-12

## 2021-05-12 RX ORDER — VANCOMYCIN HCL 1 G
VIAL (EA) INTRAVENOUS
Refills: 0 | Status: DISCONTINUED | OUTPATIENT
Start: 2021-05-12 | End: 2021-05-13

## 2021-05-12 RX ORDER — SODIUM CHLORIDE 9 MG/ML
2500 INJECTION INTRAMUSCULAR; INTRAVENOUS; SUBCUTANEOUS ONCE
Refills: 0 | Status: COMPLETED | OUTPATIENT
Start: 2021-05-12 | End: 2021-05-12

## 2021-05-12 RX ORDER — SODIUM,POTASSIUM PHOSPHATES 278-250MG
2 POWDER IN PACKET (EA) ORAL ONCE
Refills: 0 | Status: COMPLETED | OUTPATIENT
Start: 2021-05-12 | End: 2021-05-12

## 2021-05-12 RX ORDER — PIPERACILLIN AND TAZOBACTAM 4; .5 G/20ML; G/20ML
3.38 INJECTION, POWDER, LYOPHILIZED, FOR SOLUTION INTRAVENOUS EVERY 8 HOURS
Refills: 0 | Status: DISCONTINUED | OUTPATIENT
Start: 2021-05-12 | End: 2021-05-13

## 2021-05-12 RX ORDER — PANTOPRAZOLE SODIUM 20 MG/1
40 TABLET, DELAYED RELEASE ORAL ONCE
Refills: 0 | Status: COMPLETED | OUTPATIENT
Start: 2021-05-12 | End: 2021-05-12

## 2021-05-12 RX ORDER — VANCOMYCIN HCL 1 G
1250 VIAL (EA) INTRAVENOUS EVERY 12 HOURS
Refills: 0 | Status: DISCONTINUED | OUTPATIENT
Start: 2021-05-13 | End: 2021-05-13

## 2021-05-12 RX ADMIN — PANTOPRAZOLE SODIUM 40 MILLIGRAM(S): 20 TABLET, DELAYED RELEASE ORAL at 23:00

## 2021-05-12 RX ADMIN — DEXMEDETOMIDINE HYDROCHLORIDE IN 0.9% SODIUM CHLORIDE 4.2 MICROGRAM(S)/KG/HR: 4 INJECTION INTRAVENOUS at 01:37

## 2021-05-12 RX ADMIN — CHLORHEXIDINE GLUCONATE 15 MILLILITER(S): 213 SOLUTION TOPICAL at 05:07

## 2021-05-12 RX ADMIN — CHLORHEXIDINE GLUCONATE 15 MILLILITER(S): 213 SOLUTION TOPICAL at 17:03

## 2021-05-12 RX ADMIN — PHENYLEPHRINE HYDROCHLORIDE 15.7 MICROGRAM(S)/KG/MIN: 10 INJECTION INTRAVENOUS at 07:47

## 2021-05-12 RX ADMIN — Medication 2.5 MILLIGRAM(S): at 20:06

## 2021-05-12 RX ADMIN — DEXMEDETOMIDINE HYDROCHLORIDE IN 0.9% SODIUM CHLORIDE 4.2 MICROGRAM(S)/KG/HR: 4 INJECTION INTRAVENOUS at 04:33

## 2021-05-12 RX ADMIN — HYDROMORPHONE HYDROCHLORIDE 1 MILLIGRAM(S): 2 INJECTION INTRAMUSCULAR; INTRAVENOUS; SUBCUTANEOUS at 15:09

## 2021-05-12 RX ADMIN — METHADONE HYDROCHLORIDE 30 MILLIGRAM(S): 40 TABLET ORAL at 21:47

## 2021-05-12 RX ADMIN — Medication 0.1 MILLIGRAM(S): at 17:23

## 2021-05-12 RX ADMIN — ONDANSETRON 4 MILLIGRAM(S): 8 TABLET, FILM COATED ORAL at 23:49

## 2021-05-12 RX ADMIN — FENTANYL CITRATE 25 MICROGRAM(S): 50 INJECTION INTRAVENOUS at 16:59

## 2021-05-12 RX ADMIN — Medication 166.67 MILLIGRAM(S): at 23:10

## 2021-05-12 RX ADMIN — Medication 1000 MILLIGRAM(S): at 11:02

## 2021-05-12 RX ADMIN — METHADONE HYDROCHLORIDE 5 MILLIGRAM(S): 40 TABLET ORAL at 13:01

## 2021-05-12 RX ADMIN — Medication 3 MILLIGRAM(S): at 21:47

## 2021-05-12 RX ADMIN — Medication 10 MILLIGRAM(S): at 11:02

## 2021-05-12 RX ADMIN — Medication 3 MILLIGRAM(S): at 13:01

## 2021-05-12 RX ADMIN — MIDODRINE HYDROCHLORIDE 20 MILLIGRAM(S): 2.5 TABLET ORAL at 21:47

## 2021-05-12 RX ADMIN — Medication 2 MILLIGRAM(S): at 22:40

## 2021-05-12 RX ADMIN — METHADONE HYDROCHLORIDE 30 MILLIGRAM(S): 40 TABLET ORAL at 18:06

## 2021-05-12 RX ADMIN — Medication 1 MILLIGRAM(S): at 21:48

## 2021-05-12 RX ADMIN — Medication 2 MILLIGRAM(S): at 08:19

## 2021-05-12 RX ADMIN — Medication 2 MILLIGRAM(S): at 09:34

## 2021-05-12 RX ADMIN — QUETIAPINE FUMARATE 75 MILLIGRAM(S): 200 TABLET, FILM COATED ORAL at 17:08

## 2021-05-12 RX ADMIN — HYDROMORPHONE HYDROCHLORIDE 1 MILLIGRAM(S): 2 INJECTION INTRAMUSCULAR; INTRAVENOUS; SUBCUTANEOUS at 14:45

## 2021-05-12 RX ADMIN — Medication 1: at 17:02

## 2021-05-12 RX ADMIN — Medication 10 MILLIGRAM(S): at 17:03

## 2021-05-12 RX ADMIN — PIPERACILLIN AND TAZOBACTAM 200 GRAM(S): 4; .5 INJECTION, POWDER, LYOPHILIZED, FOR SOLUTION INTRAVENOUS at 23:00

## 2021-05-12 RX ADMIN — HYDROMORPHONE HYDROCHLORIDE 1 MILLIGRAM(S): 2 INJECTION INTRAMUSCULAR; INTRAVENOUS; SUBCUTANEOUS at 15:26

## 2021-05-12 RX ADMIN — CHLORHEXIDINE GLUCONATE 1 APPLICATION(S): 213 SOLUTION TOPICAL at 05:09

## 2021-05-12 RX ADMIN — DEXMEDETOMIDINE HYDROCHLORIDE IN 0.9% SODIUM CHLORIDE 4.2 MICROGRAM(S)/KG/HR: 4 INJECTION INTRAVENOUS at 07:47

## 2021-05-12 RX ADMIN — Medication 30 MILLIGRAM(S): at 05:16

## 2021-05-12 RX ADMIN — Medication 1 TABLET(S): at 11:02

## 2021-05-12 RX ADMIN — Medication 10 MILLIGRAM(S): at 05:07

## 2021-05-12 RX ADMIN — PANTOPRAZOLE SODIUM 40 MILLIGRAM(S): 20 TABLET, DELAYED RELEASE ORAL at 11:01

## 2021-05-12 RX ADMIN — Medication 40 MILLIGRAM(S): at 05:07

## 2021-05-12 RX ADMIN — Medication 2 MILLIGRAM(S): at 03:31

## 2021-05-12 RX ADMIN — Medication 1 APPLICATION(S): at 05:09

## 2021-05-12 RX ADMIN — Medication 1: at 11:02

## 2021-05-12 RX ADMIN — DEXMEDETOMIDINE HYDROCHLORIDE IN 0.9% SODIUM CHLORIDE 4.2 MICROGRAM(S)/KG/HR: 4 INJECTION INTRAVENOUS at 00:44

## 2021-05-12 RX ADMIN — Medication 2 MILLIGRAM(S): at 16:36

## 2021-05-12 RX ADMIN — METHADONE HYDROCHLORIDE 5 MILLIGRAM(S): 40 TABLET ORAL at 05:07

## 2021-05-12 RX ADMIN — HYDROMORPHONE HYDROCHLORIDE 1 MILLIGRAM(S): 2 INJECTION INTRAMUSCULAR; INTRAVENOUS; SUBCUTANEOUS at 14:23

## 2021-05-12 RX ADMIN — SODIUM CHLORIDE 2500 MILLILITER(S): 9 INJECTION INTRAMUSCULAR; INTRAVENOUS; SUBCUTANEOUS at 21:52

## 2021-05-12 RX ADMIN — QUETIAPINE FUMARATE 25 MILLIGRAM(S): 200 TABLET, FILM COATED ORAL at 10:19

## 2021-05-12 RX ADMIN — Medication 1 MILLIGRAM(S): at 10:19

## 2021-05-12 RX ADMIN — MIDODRINE HYDROCHLORIDE 20 MILLIGRAM(S): 2.5 TABLET ORAL at 05:08

## 2021-05-12 RX ADMIN — MIDODRINE HYDROCHLORIDE 20 MILLIGRAM(S): 2.5 TABLET ORAL at 13:02

## 2021-05-12 RX ADMIN — Medication 2 MILLIGRAM(S): at 05:08

## 2021-05-12 RX ADMIN — ENOXAPARIN SODIUM 40 MILLIGRAM(S): 100 INJECTION SUBCUTANEOUS at 11:01

## 2021-05-12 RX ADMIN — FENTANYL CITRATE 25 MICROGRAM(S): 50 INJECTION INTRAVENOUS at 17:18

## 2021-05-12 RX ADMIN — Medication 1 APPLICATION(S): at 17:08

## 2021-05-12 RX ADMIN — QUETIAPINE FUMARATE 50 MILLIGRAM(S): 200 TABLET, FILM COATED ORAL at 05:07

## 2021-05-12 RX ADMIN — POLYETHYLENE GLYCOL 3350 17 GRAM(S): 17 POWDER, FOR SOLUTION ORAL at 05:06

## 2021-05-12 RX ADMIN — Medication 2 PACKET(S): at 10:19

## 2021-05-12 NOTE — PROGRESS NOTE ADULT - SUBJECTIVE AND OBJECTIVE BOX
Patient is a 55y old  Male who presents with a chief complaint of SOB (11 May 2021 09:59)    pt seen in icu [ x ], reg med floor [   ], bed [ x ], chair at bedside [   ], awake and responsive [ x ], mildly sedated, [  ],    nad [x  ]      Allergies    No Known Allergies        Vitals    T(F): 100 (05-12-21 @ 04:57), Max: 100.7 (05-12-21 @ 00:03)  HR: 83 (05-12-21 @ 04:26) (67 - 153)  BP: 101/59 (05-12-21 @ 03:45) (85/49 - 227/114)  RR: 26 (05-12-21 @ 03:45) (18 - 47)  SpO2: 100% (05-12-21 @ 04:26) (93% - 100%)  Wt(kg): --  CAPILLARY BLOOD GLUCOSE      POCT Blood Glucose.: 103 mg/dL (11 May 2021 23:17)      Labs                          9.1    10.27 )-----------( 321      ( 12 May 2021 04:35 )             28.5       05-12    143  |  105  |  29<H>  ----------------------------<  106<H>  3.5   |  35<H>  |  0.33<L>    Ca    9.1      12 May 2021 04:35  Phos  2.2     05-12  Mg     2.4     05-12    TPro  6.9  /  Alb  2.8<L>  /  TBili  1.0  /  DBili  x   /  AST  29  /  ALT  36  /  AlkPhos  81  05-12            .Sputum Sputum  04-16 @ 04:42   Moderate Enterobacter aerogenes (Carbapenem Resistant)  Normal Respiratory Monserrat present  --  Enterobacter aerogenes (Carbapenem Resistant)      .Urine Clean Catch (Midstream)  03-15 @ 00:52   No growth  --  --          Radiology Results      Meds    MEDICATIONS  (STANDING):  ascorbic acid 1000 milliGRAM(s) Oral daily  BACItracin   Ointment 1 Application(s) Topical two times a day  bisacodyl Suppository 10 milliGRAM(s) Rectal daily  chlorhexidine 0.12% Liquid 15 milliLiter(s) Oral Mucosa every 12 hours  chlorhexidine 2% Cloths 1 Application(s) Topical <User Schedule>  clonazePAM  Tablet 2 milliGRAM(s) Oral every 8 hours  dexMEDEtomidine Infusion 0.2 MICROgram(s)/kG/Hr (4.2 mL/Hr) IV Continuous <Continuous>  doxazosin 1 milliGRAM(s) Oral at bedtime  enoxaparin Injectable 40 milliGRAM(s) SubCutaneous every 24 hours  furosemide   Injectable 40 milliGRAM(s) IV Push daily  insulin lispro (ADMELOG) corrective regimen sliding scale   SubCutaneous every 6 hours  methadone    Tablet 5 milliGRAM(s) Oral every 8 hours  metoclopramide   Syrup 10 milliGRAM(s) Oral every 6 hours  midodrine 20 milliGRAM(s) Oral every 8 hours  pantoprazole   Suspension 40 milliGRAM(s) Oral daily  phenylephrine    Infusion 1 MICROgram(s)/kG/Min (15.7 mL/Hr) IV Continuous <Continuous>  polyethylene glycol 3350 17 Gram(s) Oral two times a day  predniSONE   Tablet 30 milliGRAM(s) Oral daily  QUEtiapine 50 milliGRAM(s) Oral two times a day  trimethoprim  160 mG/sulfamethoxazole 800 mG 1 Tablet(s) Oral <User Schedule>      MEDICATIONS  (PRN):  acetaminophen    Suspension .. 650 milliGRAM(s) Oral every 12 hours PRN Temp greater or equal to 38C (100.4F)  ALBUTerol    90 MICROgram(s) HFA Inhaler 2 Puff(s) Inhalation every 6 hours PRN Shortness of Breath and/or Wheezing  artificial  tears Solution 1 Drop(s) Both EYES every 4 hours PRN Dry Eyes  LORazepam   Injectable 2 milliGRAM(s) IV Push every 6 hours PRN Agitation  sodium chloride 0.9% lock flush 10 milliLiter(s) IV Push every 1 hour PRN Pre/post blood products, medications, blood draw, and to maintain line patency      Physical Exam    Neuro :  no focal deficits  Respiratory: CTA B/L  CV: RRR, S1S2, no murmurs,   Abdominal: Soft, NT, ND +BS, g- tube in place, dressing cdi  Extremities: b/l ue edema, + peripheral pulses      ASSESSMENT    Hypoxemia 2nd to covid pna   transaminitis  prediabetes  h/o appendectomy  cholecystectomy        PLAN    contact and airborne isolation  d/c remdesevir given covid ab positive noted   completed dexamethasone   started pulse steroids for 3 days - 250mg solumedrol bid now tapered off 4/14/21   C/w prednisone 40 daily for possible organizing pneumonia   Started on bactrim empirically, TIW  cont asa, vit c,    cont albuterol inhaler   pulm f/u  procalcitonin, D-dimer, crp, ldh, ferritin, lactate noted ,    afebrile   cont tylenol prn,   cont robitussin prn   pt on precedex drip    pt on fentanyl patch  resume phenylephrine drip   cont midodrine    s/p intubation 3/29/21   s/p tracheostomy 4/21/21  O2 sat 98% (93% - 100%) mv 40%  O2 via mech vent and taper fio2 as tolerated   vent mgmt as per icu  xray 3/19/21 with pneumomediastinum  rept cxr with New trace right apical pneumothorax. New mild left apical pneumothorax. Grossly stable small pneumomediastinum.  Soft tissue emphysema at the neck bases bilaterally. Grossly stable bilateral pulmonary infiltrates noted.   cxr 2/24 with No evidence of pneumothorax can be appreciated on the available image. This may be related to patient positioning. Evidence of pneumomediastinum and subcutaneous emphysema in the lower neck is again noted. There are patchy bibasilar infiltrates and elevated right hemidiaphragm noted.   cxr 3/29/21 with No significant change bilateral infiltrates. There is a small simple left apical pneumothorax. No significant pleural effusion. Bilateral subcutaneous emphysema similar to prior.   XRs on 7AM and 5PM with no obvious increase of ptx  cxr 4/4/21 with Improving bilateral airspace disease noted    cxr 4/8/21 with Small left pneumothorax noted.  thoracic surg f/u   s/p L chest tube replacement 4/18/21 for recurrence of left pneumothorax   cxr 4/20/21 with Unchanged advanced infiltrates and catheter left chest tube noted   CXR 4/11/21 with : No interval change compared to one day prior. No pneumothorax noted.   unable to flush or aspirate tube fully, noted debris in tubing which was milked out.   Once material removed from tubing able to aspirated and flush fully. Also changed dressing on pigtail which appears to be in good position.   Monitor O2 status   s/p tracheostomy 4/21/21   stay sutures out 2 weeks from OR date  cxr 4/21/21 with No evidence of active chest disease. Tracheostomy tube in place otherwise no significant change noted   cxr 4/25/21 with A 40% LEFT pneumothorax. LEFT multi-sidehole pigtail catheter overlies LEFT lower hemithorax.. Bilateral multifocal and diffuse ill-defined airspace opacities..  Follow-up AP portable chest radiograph 4/25/2021 AT 8:58 AM: Residual LEFT 30% upper zone pneumothorax. Otherwise no interval change.noted    cxr 4/30/21 Tracheostomy tube again noted. Left chest tube noted with small left apical pneumothorax unchanged. Bilateral airspace opacities overall worsening. Heart size cannot be accurately assessed in this projection noted.   cxr 5/3/21 with Large left pneumothorax noted above.   cxr 5 4/21 with No significant interval change noted above.   ct chest with Extensive chronic appearing consolidative opacities throughout the lungs, most prominent in the left lower lobe and lingula, with bronchiectasis, scarring, and volume loss. Additional scattered peripheral coarse opacities.   Mild left pneumothorax. Left lateral pleural space pigtail catheter, not in continuity with the pneumothorax. Mild bilateral hydronephrosis, similar to appearance on CT of the abdomen and pelvis on April 27. noted above   s/p 2nd chest tube 5/5/21  cxr 5/6/21 with Tracheostomy and catheter left chest tubes remain. , There are significant diffuse advanced infiltrates again noted. No pneumothorax. Above findings are similar to study earlier in the day. Present film shows a left jugular line inserted   with tip entering the superior vena cava noted above.  s/p surgical placement of gastrostomy tube 4/23/2021.   cont on tube feeding  id f/u   No need for further antibiotics as patient completed course of Meropenem  leukocytosis resolved  speutum cx with Enterobacter aerogenes (Carbapenem Resistant) noted above  andrea   lispro ss   prognosis poor   s/p 4 units prbc for anemia  f/u h/h    transfuse prbc as needed  check vitamin b12  heme onc f/u  retic is elevated.    Haptoglobin normal  ? daily phlebotomy  no hemolysis, Fe/B12/folate adequate  hemolysis is unlikely even his baseline haptoglobin may be very elevated due to COVID  direct pamella neg noted    COVID is known to cause cold agglutinin  cont current meds  mgmt as per icu          Patient is a 55y old  Male who presents with a chief complaint of SOB (11 May 2021 09:59)    pt seen in icu [ x ], reg med floor [   ], bed [ x ], chair at bedside [   ], awake and responsive [ x ], mildly sedated, [  ],    nad [x  ]      Allergies    No Known Allergies        Vitals    T(F): 100 (05-12-21 @ 04:57), Max: 100.7 (05-12-21 @ 00:03)  HR: 83 (05-12-21 @ 04:26) (67 - 153)  BP: 101/59 (05-12-21 @ 03:45) (85/49 - 227/114)  RR: 26 (05-12-21 @ 03:45) (18 - 47)  SpO2: 100% (05-12-21 @ 04:26) (93% - 100%)  Wt(kg): --  CAPILLARY BLOOD GLUCOSE      POCT Blood Glucose.: 103 mg/dL (11 May 2021 23:17)      Labs                          9.1    10.27 )-----------( 321      ( 12 May 2021 04:35 )             28.5       05-12    143  |  105  |  29<H>  ----------------------------<  106<H>  3.5   |  35<H>  |  0.33<L>    Ca    9.1      12 May 2021 04:35  Phos  2.2     05-12  Mg     2.4     05-12    TPro  6.9  /  Alb  2.8<L>  /  TBili  1.0  /  DBili  x   /  AST  29  /  ALT  36  /  AlkPhos  81  05-12            .Sputum Sputum  04-16 @ 04:42   Moderate Enterobacter aerogenes (Carbapenem Resistant)  Normal Respiratory Monserrat present  --  Enterobacter aerogenes (Carbapenem Resistant)      .Urine Clean Catch (Midstream)  03-15 @ 00:52   No growth  --  --          Radiology Results      < from: Xray Chest 1 View- PORTABLE-Routine (Xray Chest 1 View- PORTABLE-Routine in AM.) (05.11.21 @ 09:08) >  Heart magnified by technique. Tracheostomy, left jugular line, and 2 catheter left chest tubes remain.    Quite advanced infiltration in the lungs particularly in the left lower lobe again noted.    There is a persistent rather small left apical pneumothorax.    Chest is similar to May 10.    IMPRESSION: Unchanged chest as above.    < end of copied text >      Meds    MEDICATIONS  (STANDING):  ascorbic acid 1000 milliGRAM(s) Oral daily  BACItracin   Ointment 1 Application(s) Topical two times a day  bisacodyl Suppository 10 milliGRAM(s) Rectal daily  chlorhexidine 0.12% Liquid 15 milliLiter(s) Oral Mucosa every 12 hours  chlorhexidine 2% Cloths 1 Application(s) Topical <User Schedule>  clonazePAM  Tablet 2 milliGRAM(s) Oral every 8 hours  dexMEDEtomidine Infusion 0.2 MICROgram(s)/kG/Hr (4.2 mL/Hr) IV Continuous <Continuous>  doxazosin 1 milliGRAM(s) Oral at bedtime  enoxaparin Injectable 40 milliGRAM(s) SubCutaneous every 24 hours  furosemide   Injectable 40 milliGRAM(s) IV Push daily  insulin lispro (ADMELOG) corrective regimen sliding scale   SubCutaneous every 6 hours  methadone    Tablet 5 milliGRAM(s) Oral every 8 hours  metoclopramide   Syrup 10 milliGRAM(s) Oral every 6 hours  midodrine 20 milliGRAM(s) Oral every 8 hours  pantoprazole   Suspension 40 milliGRAM(s) Oral daily  phenylephrine    Infusion 1 MICROgram(s)/kG/Min (15.7 mL/Hr) IV Continuous <Continuous>  polyethylene glycol 3350 17 Gram(s) Oral two times a day  predniSONE   Tablet 30 milliGRAM(s) Oral daily  QUEtiapine 50 milliGRAM(s) Oral two times a day  trimethoprim  160 mG/sulfamethoxazole 800 mG 1 Tablet(s) Oral <User Schedule>      MEDICATIONS  (PRN):  acetaminophen    Suspension .. 650 milliGRAM(s) Oral every 12 hours PRN Temp greater or equal to 38C (100.4F)  ALBUTerol    90 MICROgram(s) HFA Inhaler 2 Puff(s) Inhalation every 6 hours PRN Shortness of Breath and/or Wheezing  artificial  tears Solution 1 Drop(s) Both EYES every 4 hours PRN Dry Eyes  LORazepam   Injectable 2 milliGRAM(s) IV Push every 6 hours PRN Agitation  sodium chloride 0.9% lock flush 10 milliLiter(s) IV Push every 1 hour PRN Pre/post blood products, medications, blood draw, and to maintain line patency      Physical Exam    Neuro :  no focal deficits  Respiratory: CTA B/L  CV: RRR, S1S2, no murmurs,   Abdominal: Soft, NT, ND +BS, g- tube in place, dressing cdi  Extremities: b/l ue edema, + peripheral pulses      ASSESSMENT    Hypoxemia 2nd to covid pna   transaminitis  prediabetes  h/o appendectomy  cholecystectomy        PLAN    contact and airborne isolation  d/c remdesevir given covid ab positive noted   completed dexamethasone   started pulse steroids for 3 days - 250mg solumedrol bid now tapered off 4/14/21   C/w prednisone 40 daily for possible organizing pneumonia   Started on bactrim empirically, TIW  cont asa, vit c,    cont albuterol inhaler   pulm f/u  procalcitonin, D-dimer, crp, ldh, ferritin, lactate noted ,    tmx 100.7  cont tylenol prn,   cont robitussin prn   pt on precedex drip    pt on fentanyl patch  pt on phenylephrine drip   cont midodrine    s/p intubation 3/29/21   s/p tracheostomy 4/21/21  O2 sat 100% (93% - 100%) mv 40%  O2 via mech vent and taper fio2 as tolerated   vent mgmt as per icu  xray 3/19/21 with pneumomediastinum  rept cxr with New trace right apical pneumothorax. New mild left apical pneumothorax. Grossly stable small pneumomediastinum.  Soft tissue emphysema at the neck bases bilaterally. Grossly stable bilateral pulmonary infiltrates noted.   cxr 2/24 with No evidence of pneumothorax can be appreciated on the available image. This may be related to patient positioning. Evidence of pneumomediastinum and subcutaneous emphysema in the lower neck is again noted. There are patchy bibasilar infiltrates and elevated right hemidiaphragm noted.   cxr 3/29/21 with No significant change bilateral infiltrates. There is a small simple left apical pneumothorax. No significant pleural effusion. Bilateral subcutaneous emphysema similar to prior.   XRs on 7AM and 5PM with no obvious increase of ptx  cxr 4/4/21 with Improving bilateral airspace disease noted    cxr 4/8/21 with Small left pneumothorax noted.  thoracic surg f/u   s/p L chest tube replacement 4/18/21 for recurrence of left pneumothorax   cxr 4/20/21 with Unchanged advanced infiltrates and catheter left chest tube noted   CXR 4/11/21 with : No interval change compared to one day prior. No pneumothorax noted.   unable to flush or aspirate tube fully, noted debris in tubing which was milked out.   Once material removed from tubing able to aspirated and flush fully. Also changed dressing on pigtail which appears to be in good position.   Monitor O2 status   s/p tracheostomy 4/21/21   stay sutures out 2 weeks from OR date  cxr 4/21/21 with No evidence of active chest disease. Tracheostomy tube in place otherwise no significant change noted   cxr 4/25/21 with A 40% LEFT pneumothorax. LEFT multi-sidehole pigtail catheter overlies LEFT lower hemithorax.. Bilateral multifocal and diffuse ill-defined airspace opacities..  Follow-up AP portable chest radiograph 4/25/2021 AT 8:58 AM: Residual LEFT 30% upper zone pneumothorax. Otherwise no interval change.noted    cxr 4/30/21 Tracheostomy tube again noted. Left chest tube noted with small left apical pneumothorax unchanged. Bilateral airspace opacities overall worsening. Heart size cannot be accurately assessed in this projection noted.   cxr 5/3/21 with Large left pneumothorax noted above.   cxr 5 4/21 with No significant interval change noted above.   ct chest with Extensive chronic appearing consolidative opacities throughout the lungs, most prominent in the left lower lobe and lingula, with bronchiectasis, scarring, and volume loss. Additional scattered peripheral coarse opacities.   Mild left pneumothorax. Left lateral pleural space pigtail catheter, not in continuity with the pneumothorax. Mild bilateral hydronephrosis, similar to appearance on CT of the abdomen and pelvis on April 27. noted above   s/p 2nd chest tube 5/5/21  cxr 5/6/21 with Tracheostomy and catheter left chest tubes remain. , There are significant diffuse advanced infiltrates again noted. No pneumothorax. Above findings are similar to study earlier in the day. Present film shows a left jugular line inserted   with tip entering the superior vena cava noted   cxr 5/11/21 with Heart magnified by technique. Tracheostomy, left jugular line, and 2 catheter left chest tubes remain. Quite advanced infiltration in the lungs particularly in the left lower lobe again noted.  There is a persistent rather small left apical pneumothorax. Chest is similar to May 10 noted above.  s/p surgical placement of gastrostomy tube 4/23/2021.   abd xray 5/10/21 with ileus noted  cont on tube feeding  id f/u   No need for further antibiotics as patient completed course of Meropenem  leukocytosis resolved  speutum cx with Enterobacter aerogenes (Carbapenem Resistant) noted above  andrea   lispro ss   prognosis poor   s/p 4 units prbc for anemia  f/u h/h    transfuse prbc as needed  check vitamin b12  heme onc f/u  retic is elevated.    Haptoglobin normal  ? daily phlebotomy  no hemolysis, Fe/B12/folate adequate  hemolysis is unlikely even his baseline haptoglobin may be very elevated due to COVID  direct pamella neg noted    COVID is known to cause cold agglutinin  cont current meds  mgmt as per icu

## 2021-05-12 NOTE — PROGRESS NOTE ADULT - SUBJECTIVE AND OBJECTIVE BOX
INTERVAL HPI/OVERNIGHT EVENTS: Pt w/ fever to 100.7 overnight. Still intermittently agitated requiring PRN ativan. Started lactulose as still to BMs.     PRESSORS: [x] YES [] NO     WHICH: pheny    Antimicrobial:  trimethoprim  160 mG/sulfamethoxazole 800 mG 1 Tablet(s) Oral <User Schedule>    Cardiovascular:  doxazosin 1 milliGRAM(s) Oral at bedtime  furosemide   Injectable 40 milliGRAM(s) IV Push daily  midodrine 20 milliGRAM(s) Oral every 8 hours  phenylephrine    Infusion 1 MICROgram(s)/kG/Min IV Continuous <Continuous>    Pulmonary:  ALBUTerol    90 MICROgram(s) HFA Inhaler 2 Puff(s) Inhalation every 6 hours PRN    Hematalogic:  enoxaparin Injectable 40 milliGRAM(s) SubCutaneous every 24 hours    Other:  acetaminophen    Suspension .. 650 milliGRAM(s) Oral every 12 hours PRN  artificial  tears Solution 1 Drop(s) Both EYES every 4 hours PRN  ascorbic acid 1000 milliGRAM(s) Oral daily  BACItracin   Ointment 1 Application(s) Topical two times a day  bisacodyl Suppository 10 milliGRAM(s) Rectal daily  chlorhexidine 0.12% Liquid 15 milliLiter(s) Oral Mucosa every 12 hours  chlorhexidine 2% Cloths 1 Application(s) Topical <User Schedule>  clonazePAM  Tablet 2 milliGRAM(s) Oral every 8 hours  dexMEDEtomidine Infusion 0.2 MICROgram(s)/kG/Hr IV Continuous <Continuous>  insulin lispro (ADMELOG) corrective regimen sliding scale   SubCutaneous every 6 hours  LORazepam   Injectable 2 milliGRAM(s) IV Push every 6 hours PRN  methadone    Tablet 5 milliGRAM(s) Oral every 8 hours  metoclopramide   Syrup 10 milliGRAM(s) Oral every 6 hours  pantoprazole   Suspension 40 milliGRAM(s) Oral daily  polyethylene glycol 3350 17 Gram(s) Oral two times a day  potassium phosphate / sodium phosphate Powder (PHOS-NaK) 2 Packet(s) Oral once  predniSONE   Tablet 30 milliGRAM(s) Oral daily  QUEtiapine 50 milliGRAM(s) Oral two times a day  sodium chloride 0.9% lock flush 10 milliLiter(s) IV Push every 1 hour PRN    acetaminophen    Suspension .. 650 milliGRAM(s) Oral every 12 hours PRN  ALBUTerol    90 MICROgram(s) HFA Inhaler 2 Puff(s) Inhalation every 6 hours PRN  artificial  tears Solution 1 Drop(s) Both EYES every 4 hours PRN  ascorbic acid 1000 milliGRAM(s) Oral daily  BACItracin   Ointment 1 Application(s) Topical two times a day  bisacodyl Suppository 10 milliGRAM(s) Rectal daily  chlorhexidine 0.12% Liquid 15 milliLiter(s) Oral Mucosa every 12 hours  chlorhexidine 2% Cloths 1 Application(s) Topical <User Schedule>  clonazePAM  Tablet 2 milliGRAM(s) Oral every 8 hours  dexMEDEtomidine Infusion 0.2 MICROgram(s)/kG/Hr IV Continuous <Continuous>  doxazosin 1 milliGRAM(s) Oral at bedtime  enoxaparin Injectable 40 milliGRAM(s) SubCutaneous every 24 hours  furosemide   Injectable 40 milliGRAM(s) IV Push daily  insulin lispro (ADMELOG) corrective regimen sliding scale   SubCutaneous every 6 hours  LORazepam   Injectable 2 milliGRAM(s) IV Push every 6 hours PRN  methadone    Tablet 5 milliGRAM(s) Oral every 8 hours  metoclopramide   Syrup 10 milliGRAM(s) Oral every 6 hours  midodrine 20 milliGRAM(s) Oral every 8 hours  pantoprazole   Suspension 40 milliGRAM(s) Oral daily  phenylephrine    Infusion 1 MICROgram(s)/kG/Min IV Continuous <Continuous>  polyethylene glycol 3350 17 Gram(s) Oral two times a day  potassium phosphate / sodium phosphate Powder (PHOS-NaK) 2 Packet(s) Oral once  predniSONE   Tablet 30 milliGRAM(s) Oral daily  QUEtiapine 50 milliGRAM(s) Oral two times a day  sodium chloride 0.9% lock flush 10 milliLiter(s) IV Push every 1 hour PRN  trimethoprim  160 mG/sulfamethoxazole 800 mG 1 Tablet(s) Oral <User Schedule>    Drug Dosing Weight  Height (cm): 167.6 (2021 23:15)  Weight (kg): 83.9 (2021 23:15)  BMI (kg/m2): 29.9 (2021 23:15)  BSA (m2): 1.93 (2021 23:15)    CENTRAL LINE: [x] YES [] NO  LOCATION: [facility]  DATE INSERTED:   REMOVE: [] YES [] NO  EXPLAIN:    RIVERA: [x] YES [] NO    DATE INSERTED:  REMOVE:  [] YES [] NO  EXPLAIN: Strict IOs    A-LINE:  [] YES [x] NO  LOCATION:   DATE INSERTED:  REMOVE:  [] YES [] NO  EXPLAIN:    PMH -reviewed admission note, no change since admission  PAST MEDICAL & SURGICAL HISTORY:  No pertinent past medical history    S/P moody  1990s    S/P appendectomy  1990s        ICU Vital Signs Last 24 Hrs  T(C): 37.8 (12 May 2021 04:57), Max: 38.2 (12 May 2021 00:03)  T(F): 100 (12 May 2021 04:57), Max: 100.7 (12 May 2021 00:03)  HR: 72 (12 May 2021 07:00) (67 - 150)  BP: 105/57 (12 May 2021 07:00) (85/49 - 227/114)  BP(mean): 67 (12 May 2021 07:00) (54 - 156)  ABP: --  ABP(mean): --  RR: 21 (12 May 2021 07:00) (16 - 47)  SpO2: 100% (12 May 2021 07:00) (93% - 100%)      ABG - ( 12 May 2021 04:27 )  pH, Arterial: 7.45  pH, Blood: x     /  pCO2: 58    /  pO2: 87    / HCO3: 40    / Base Excess: 13.6  /  SaO2: 98                    05-11 @ 07:01  -  05-12 @ 07:00  --------------------------------------------------------  IN: 1137.4 mL / OUT: 1187 mL / NET: -49.6 mL        Mode: AC/ CMV (Assist Control/ Continuous Mandatory Ventilation)  RR (machine): 14  TV (machine): 400  FiO2: 40  PEEP: 5  ITime: 1  MAP: 10  PIP: 34      REVIEW OF SYSTEMS:    Unable to complete - patient intubated        PHYSICAL EXAM:    GENERAL: NAD, intermittently restless  HEAD:  Atraumatic, Normocephalic  EYES: EOMI, PERRL, conjunctiva and sclera clear  NECK: Supple, normal appearance, No JVD; Normal thyroid; Trachea midline + trach  NERVOUS SYSTEM:  Awake + encephalopathy  Moving all 4 extremities  CHEST/LUNx chest tube on L. No chest deformity; Lungs clear to auscultation b/l. No rales, rhonchi, wheezing   HEART: Tachycardic Regular rhythm; No murmurs, rubs, or gallops  ABDOMEN: PEG in place. Soft, Nontender, Nondistended; Bowel sounds present + PEG  EXTREMITIES:  Mild dependent edema. R arm edema and ecchymosis.  Peripheral Pulses intact, No clubbing, cyanosis,   SKIN: Intact, No rashes or lesions    LABS:  CBC Full  -  ( 12 May 2021 04:35 )  WBC Count : 10.27 K/uL  RBC Count : 2.73 M/uL  Hemoglobin : 9.1 g/dL  Hematocrit : 28.5 %  Platelet Count - Automated : 321 K/uL  Mean Cell Volume : 104.4 fl  Mean Cell Hemoglobin : 33.3 pg  Mean Cell Hemoglobin Concentration : 31.9 gm/dL  Auto Neutrophil # : x  Auto Lymphocyte # : x  Auto Monocyte # : x  Auto Eosinophil # : x  Auto Basophil # : x  Auto Neutrophil % : x  Auto Lymphocyte % : x  Auto Monocyte % : x  Auto Eosinophil % : x  Auto Basophil % : x    05-12    143  |  105  |  29<H>  ----------------------------<  106<H>  3.5   |  35<H>  |  0.33<L>    Ca    9.1      12 May 2021 04:35  Phos  2.2     05-12  Mg     2.4     -12    TPro  6.9  /  Alb  2.8<L>  /  TBili  1.0  /  DBili  x   /  AST  29  /  ALT  36  /  AlkPhos  81  05-12            RADIOLOGY & ADDITIONAL STUDIES REVIEWED:  ***      GOALS OF CARE DISCUSSION WITH PATIENT/FAMILY/PROXY:     INTERVAL HPI/OVERNIGHT EVENTS: Pt w/ fever to 100.7 overnight. Still intermittently agitated requiring PRN ativan. Will uptitrate standing seroquel and klonopin. Pt had large BM this AM. Will restart tube feeds.     PRESSORS: [x] YES [] NO     WHICH: pheny    Antimicrobial:  trimethoprim  160 mG/sulfamethoxazole 800 mG 1 Tablet(s) Oral <User Schedule>    Cardiovascular:  doxazosin 1 milliGRAM(s) Oral at bedtime  furosemide   Injectable 40 milliGRAM(s) IV Push daily  midodrine 20 milliGRAM(s) Oral every 8 hours  phenylephrine    Infusion 1 MICROgram(s)/kG/Min IV Continuous <Continuous>    Pulmonary:  ALBUTerol    90 MICROgram(s) HFA Inhaler 2 Puff(s) Inhalation every 6 hours PRN    Hematalogic:  enoxaparin Injectable 40 milliGRAM(s) SubCutaneous every 24 hours    Other:  acetaminophen    Suspension .. 650 milliGRAM(s) Oral every 12 hours PRN  artificial  tears Solution 1 Drop(s) Both EYES every 4 hours PRN  ascorbic acid 1000 milliGRAM(s) Oral daily  BACItracin   Ointment 1 Application(s) Topical two times a day  bisacodyl Suppository 10 milliGRAM(s) Rectal daily  chlorhexidine 0.12% Liquid 15 milliLiter(s) Oral Mucosa every 12 hours  chlorhexidine 2% Cloths 1 Application(s) Topical <User Schedule>  clonazePAM  Tablet 2 milliGRAM(s) Oral every 8 hours  dexMEDEtomidine Infusion 0.2 MICROgram(s)/kG/Hr IV Continuous <Continuous>  insulin lispro (ADMELOG) corrective regimen sliding scale   SubCutaneous every 6 hours  LORazepam   Injectable 2 milliGRAM(s) IV Push every 6 hours PRN  methadone    Tablet 5 milliGRAM(s) Oral every 8 hours  metoclopramide   Syrup 10 milliGRAM(s) Oral every 6 hours  pantoprazole   Suspension 40 milliGRAM(s) Oral daily  polyethylene glycol 3350 17 Gram(s) Oral two times a day  potassium phosphate / sodium phosphate Powder (PHOS-NaK) 2 Packet(s) Oral once  predniSONE   Tablet 30 milliGRAM(s) Oral daily  QUEtiapine 50 milliGRAM(s) Oral two times a day  sodium chloride 0.9% lock flush 10 milliLiter(s) IV Push every 1 hour PRN    acetaminophen    Suspension .. 650 milliGRAM(s) Oral every 12 hours PRN  ALBUTerol    90 MICROgram(s) HFA Inhaler 2 Puff(s) Inhalation every 6 hours PRN  artificial  tears Solution 1 Drop(s) Both EYES every 4 hours PRN  ascorbic acid 1000 milliGRAM(s) Oral daily  BACItracin   Ointment 1 Application(s) Topical two times a day  bisacodyl Suppository 10 milliGRAM(s) Rectal daily  chlorhexidine 0.12% Liquid 15 milliLiter(s) Oral Mucosa every 12 hours  chlorhexidine 2% Cloths 1 Application(s) Topical <User Schedule>  clonazePAM  Tablet 2 milliGRAM(s) Oral every 8 hours  dexMEDEtomidine Infusion 0.2 MICROgram(s)/kG/Hr IV Continuous <Continuous>  doxazosin 1 milliGRAM(s) Oral at bedtime  enoxaparin Injectable 40 milliGRAM(s) SubCutaneous every 24 hours  furosemide   Injectable 40 milliGRAM(s) IV Push daily  insulin lispro (ADMELOG) corrective regimen sliding scale   SubCutaneous every 6 hours  LORazepam   Injectable 2 milliGRAM(s) IV Push every 6 hours PRN  methadone    Tablet 5 milliGRAM(s) Oral every 8 hours  metoclopramide   Syrup 10 milliGRAM(s) Oral every 6 hours  midodrine 20 milliGRAM(s) Oral every 8 hours  pantoprazole   Suspension 40 milliGRAM(s) Oral daily  phenylephrine    Infusion 1 MICROgram(s)/kG/Min IV Continuous <Continuous>  polyethylene glycol 3350 17 Gram(s) Oral two times a day  potassium phosphate / sodium phosphate Powder (PHOS-NaK) 2 Packet(s) Oral once  predniSONE   Tablet 30 milliGRAM(s) Oral daily  QUEtiapine 50 milliGRAM(s) Oral two times a day  sodium chloride 0.9% lock flush 10 milliLiter(s) IV Push every 1 hour PRN  trimethoprim  160 mG/sulfamethoxazole 800 mG 1 Tablet(s) Oral <User Schedule>    Drug Dosing Weight  Height (cm): 167.6 (2021 23:15)  Weight (kg): 83.9 (2021 23:15)  BMI (kg/m2): 29.9 (2021 23:15)  BSA (m2): 1.93 (2021 23:15)    CENTRAL LINE: [x] YES [] NO  LOCATION: [facility]  DATE INSERTED:   REMOVE: [] YES [] NO  EXPLAIN:    RIVERA: [x] YES [] NO    DATE INSERTED:  REMOVE:  [] YES [] NO  EXPLAIN: Strict IOs    A-LINE:  [] YES [x] NO  LOCATION:   DATE INSERTED:  REMOVE:  [] YES [] NO  EXPLAIN:    PMH -reviewed admission note, no change since admission  PAST MEDICAL & SURGICAL HISTORY:  No pertinent past medical history    S/P moody  1990s    S/P appendectomy  1990s        ICU Vital Signs Last 24 Hrs  T(C): 37.8 (12 May 2021 04:57), Max: 38.2 (12 May 2021 00:03)  T(F): 100 (12 May 2021 04:57), Max: 100.7 (12 May 2021 00:03)  HR: 72 (12 May 2021 07:00) (67 - 150)  BP: 105/57 (12 May 2021 07:00) (85/49 - 227/114)  BP(mean): 67 (12 May 2021 07:00) (54 - 156)  ABP: --  ABP(mean): --  RR: 21 (12 May 2021 07:00) (16 - 47)  SpO2: 100% (12 May 2021 07:00) (93% - 100%)      ABG - ( 12 May 2021 04:27 )  pH, Arterial: 7.45  pH, Blood: x     /  pCO2: 58    /  pO2: 87    / HCO3: 40    / Base Excess: 13.6  /  SaO2: 98                    05-11 @ 07:01  -  05-12 @ 07:00  --------------------------------------------------------  IN: 1137.4 mL / OUT: 1187 mL / NET: -49.6 mL        Mode: AC/ CMV (Assist Control/ Continuous Mandatory Ventilation)  RR (machine): 14  TV (machine): 400  FiO2: 40  PEEP: 5  ITime: 1  MAP: 10  PIP: 34      REVIEW OF SYSTEMS:    Unable to complete - patient intubated        PHYSICAL EXAM:    GENERAL: NAD, intermittently restless  HEAD:  Atraumatic, Normocephalic  EYES: EOMI, PERRL, conjunctiva and sclera clear  NECK: Supple, normal appearance, No JVD; Normal thyroid; Trachea midline + trach  NERVOUS SYSTEM:  Awake + encephalopathy  Moving all 4 extremities  CHEST/LUNx chest tube on L. No chest deformity; Lungs clear to auscultation b/l. No rales, rhonchi, wheezing   HEART: Tachycardic Regular rhythm; No murmurs, rubs, or gallops  ABDOMEN: PEG in place. Soft, Nontender, Nondistended; Bowel sounds present + PEG  EXTREMITIES:  Mild dependent edema. R arm edema and ecchymosis.  Peripheral Pulses intact, No clubbing, cyanosis,   SKIN: Intact, No rashes or lesions    LABS:  CBC Full  -  ( 12 May 2021 04:35 )  WBC Count : 10.27 K/uL  RBC Count : 2.73 M/uL  Hemoglobin : 9.1 g/dL  Hematocrit : 28.5 %  Platelet Count - Automated : 321 K/uL  Mean Cell Volume : 104.4 fl  Mean Cell Hemoglobin : 33.3 pg  Mean Cell Hemoglobin Concentration : 31.9 gm/dL  Auto Neutrophil # : x  Auto Lymphocyte # : x  Auto Monocyte # : x  Auto Eosinophil # : x  Auto Basophil # : x  Auto Neutrophil % : x  Auto Lymphocyte % : x  Auto Monocyte % : x  Auto Eosinophil % : x  Auto Basophil % : x    05-12    143  |  105  |  29<H>  ----------------------------<  106<H>  3.5   |  35<H>  |  0.33<L>    Ca    9.1      12 May 2021 04:35  Phos  2.2     05-12  Mg     2.4     -12    TPro  6.9  /  Alb  2.8<L>  /  TBili  1.0  /  DBili  x   /  AST  29  /  ALT  36  /  AlkPhos  81  -12            RADIOLOGY & ADDITIONAL STUDIES REVIEWED:  ***      GOALS OF CARE DISCUSSION WITH PATIENT/FAMILY/PROXY:     INTERVAL HPI/OVERNIGHT EVENTS: Pt w/ fever to 100.7 overnight. Still intermittently agitated requiring PRN ativan. Will uptitrate standing seroquel and klonopin. Pt had large BM this AM x2. Will restart tube feeds.     PRESSORS: [x] YES [] NO     WHICH: pheny    Antimicrobial:  trimethoprim  160 mG/sulfamethoxazole 800 mG 1 Tablet(s) Oral <User Schedule>    Cardiovascular:  doxazosin 1 milliGRAM(s) Oral at bedtime  furosemide   Injectable 40 milliGRAM(s) IV Push daily  midodrine 20 milliGRAM(s) Oral every 8 hours  phenylephrine    Infusion 1 MICROgram(s)/kG/Min IV Continuous <Continuous>    Pulmonary:  ALBUTerol    90 MICROgram(s) HFA Inhaler 2 Puff(s) Inhalation every 6 hours PRN    Hematalogic:  enoxaparin Injectable 40 milliGRAM(s) SubCutaneous every 24 hours    Other:  acetaminophen    Suspension .. 650 milliGRAM(s) Oral every 12 hours PRN  artificial  tears Solution 1 Drop(s) Both EYES every 4 hours PRN  ascorbic acid 1000 milliGRAM(s) Oral daily  BACItracin   Ointment 1 Application(s) Topical two times a day  bisacodyl Suppository 10 milliGRAM(s) Rectal daily  chlorhexidine 0.12% Liquid 15 milliLiter(s) Oral Mucosa every 12 hours  chlorhexidine 2% Cloths 1 Application(s) Topical <User Schedule>  clonazePAM  Tablet 2 milliGRAM(s) Oral every 8 hours  dexMEDEtomidine Infusion 0.2 MICROgram(s)/kG/Hr IV Continuous <Continuous>  insulin lispro (ADMELOG) corrective regimen sliding scale   SubCutaneous every 6 hours  LORazepam   Injectable 2 milliGRAM(s) IV Push every 6 hours PRN  methadone    Tablet 5 milliGRAM(s) Oral every 8 hours  metoclopramide   Syrup 10 milliGRAM(s) Oral every 6 hours  pantoprazole   Suspension 40 milliGRAM(s) Oral daily  polyethylene glycol 3350 17 Gram(s) Oral two times a day  potassium phosphate / sodium phosphate Powder (PHOS-NaK) 2 Packet(s) Oral once  predniSONE   Tablet 30 milliGRAM(s) Oral daily  QUEtiapine 50 milliGRAM(s) Oral two times a day  sodium chloride 0.9% lock flush 10 milliLiter(s) IV Push every 1 hour PRN    acetaminophen    Suspension .. 650 milliGRAM(s) Oral every 12 hours PRN  ALBUTerol    90 MICROgram(s) HFA Inhaler 2 Puff(s) Inhalation every 6 hours PRN  artificial  tears Solution 1 Drop(s) Both EYES every 4 hours PRN  ascorbic acid 1000 milliGRAM(s) Oral daily  BACItracin   Ointment 1 Application(s) Topical two times a day  bisacodyl Suppository 10 milliGRAM(s) Rectal daily  chlorhexidine 0.12% Liquid 15 milliLiter(s) Oral Mucosa every 12 hours  chlorhexidine 2% Cloths 1 Application(s) Topical <User Schedule>  clonazePAM  Tablet 2 milliGRAM(s) Oral every 8 hours  dexMEDEtomidine Infusion 0.2 MICROgram(s)/kG/Hr IV Continuous <Continuous>  doxazosin 1 milliGRAM(s) Oral at bedtime  enoxaparin Injectable 40 milliGRAM(s) SubCutaneous every 24 hours  furosemide   Injectable 40 milliGRAM(s) IV Push daily  insulin lispro (ADMELOG) corrective regimen sliding scale   SubCutaneous every 6 hours  LORazepam   Injectable 2 milliGRAM(s) IV Push every 6 hours PRN  methadone    Tablet 5 milliGRAM(s) Oral every 8 hours  metoclopramide   Syrup 10 milliGRAM(s) Oral every 6 hours  midodrine 20 milliGRAM(s) Oral every 8 hours  pantoprazole   Suspension 40 milliGRAM(s) Oral daily  phenylephrine    Infusion 1 MICROgram(s)/kG/Min IV Continuous <Continuous>  polyethylene glycol 3350 17 Gram(s) Oral two times a day  potassium phosphate / sodium phosphate Powder (PHOS-NaK) 2 Packet(s) Oral once  predniSONE   Tablet 30 milliGRAM(s) Oral daily  QUEtiapine 50 milliGRAM(s) Oral two times a day  sodium chloride 0.9% lock flush 10 milliLiter(s) IV Push every 1 hour PRN  trimethoprim  160 mG/sulfamethoxazole 800 mG 1 Tablet(s) Oral <User Schedule>    Drug Dosing Weight  Height (cm): 167.6 (2021 23:15)  Weight (kg): 83.9 (2021 23:15)  BMI (kg/m2): 29.9 (2021 23:15)  BSA (m2): 1.93 (2021 23:15)    CENTRAL LINE: [x] YES [] NO  LOCATION: [facility]  DATE INSERTED:   REMOVE: [] YES [] NO  EXPLAIN:    RIVERA: [x] YES [] NO    DATE INSERTED:  REMOVE:  [] YES [] NO  EXPLAIN: Strict IOs    A-LINE:  [] YES [x] NO  LOCATION:   DATE INSERTED:  REMOVE:  [] YES [] NO  EXPLAIN:    PMH -reviewed admission note, no change since admission  PAST MEDICAL & SURGICAL HISTORY:  No pertinent past medical history    S/P moody  1990s    S/P appendectomy          ICU Vital Signs Last 24 Hrs  T(C): 37.8 (12 May 2021 04:57), Max: 38.2 (12 May 2021 00:03)  T(F): 100 (12 May 2021 04:57), Max: 100.7 (12 May 2021 00:03)  HR: 72 (12 May 2021 07:00) (67 - 150)  BP: 105/57 (12 May 2021 07:00) (85/49 - 227/114)  BP(mean): 67 (12 May 2021 07:00) (54 - 156)  ABP: --  ABP(mean): --  RR: 21 (12 May 2021 07:00) (16 - 47)  SpO2: 100% (12 May 2021 07:00) (93% - 100%)      ABG - ( 12 May 2021 04:27 )  pH, Arterial: 7.45  pH, Blood: x     /  pCO2: 58    /  pO2: 87    / HCO3: 40    / Base Excess: 13.6  /  SaO2: 98                    05-11 @ 07:01  -  05-12 @ 07:00  --------------------------------------------------------  IN: 1137.4 mL / OUT: 1187 mL / NET: -49.6 mL        Mode: AC/ CMV (Assist Control/ Continuous Mandatory Ventilation)  RR (machine): 14  TV (machine): 400  FiO2: 40  PEEP: 5  ITime: 1  MAP: 10  PIP: 34      REVIEW OF SYSTEMS:    Unable to complete - patient intubated        PHYSICAL EXAM:    GENERAL: NAD, intermittently restless  HEAD:  Atraumatic, Normocephalic  EYES: EOMI, PERRL, conjunctiva and sclera clear  NECK: Supple, normal appearance, No JVD; Normal thyroid; Trachea midline + trach  NERVOUS SYSTEM:  Awake + encephalopathy  Moving all 4 extremities  CHEST/LUNx chest tube on L. No chest deformity; Lungs clear to auscultation b/l. No rales, rhonchi, wheezing   HEART: Tachycardic Regular rhythm; No murmurs, rubs, or gallops  ABDOMEN: PEG in place. Soft, Nontender, Nondistended; Bowel sounds present + PEG  EXTREMITIES:  Mild dependent edema. R arm edema and ecchymosis.  Peripheral Pulses intact, No clubbing, cyanosis,   SKIN: Intact, No rashes or lesions    LABS:  CBC Full  -  ( 12 May 2021 04:35 )  WBC Count : 10.27 K/uL  RBC Count : 2.73 M/uL  Hemoglobin : 9.1 g/dL  Hematocrit : 28.5 %  Platelet Count - Automated : 321 K/uL  Mean Cell Volume : 104.4 fl  Mean Cell Hemoglobin : 33.3 pg  Mean Cell Hemoglobin Concentration : 31.9 gm/dL  Auto Neutrophil # : x  Auto Lymphocyte # : x  Auto Monocyte # : x  Auto Eosinophil # : x  Auto Basophil # : x  Auto Neutrophil % : x  Auto Lymphocyte % : x  Auto Monocyte % : x  Auto Eosinophil % : x  Auto Basophil % : x    05-12    143  |  105  |  29<H>  ----------------------------<  106<H>  3.5   |  35<H>  |  0.33<L>    Ca    9.1      12 May 2021 04:35  Phos  2.2     05-12  Mg     2.4     -12    TPro  6.9  /  Alb  2.8<L>  /  TBili  1.0  /  DBili  x   /  AST  29  /  ALT  36  /  AlkPhos  81  05-12            RADIOLOGY & ADDITIONAL STUDIES REVIEWED:  ***      GOALS OF CARE DISCUSSION WITH PATIENT/FAMILY/PROXY:

## 2021-05-12 NOTE — PROGRESS NOTE ADULT - SUBJECTIVE AND OBJECTIVE BOX
Patient is a 55y old  Male who presents with a chief complaint of SOB (12 May 2021 08:10)  Patient is awake, responsive, on ventilator via trach in mild distress. Tachypneic    INTERVAL HPI/OVERNIGHT EVENTS:      VITAL SIGNS:  T(F): 99 (05-12-21 @ 07:00)  HR: 138 (05-12-21 @ 10:00)  BP: 116/71 (05-12-21 @ 10:00)  RR: 44 (05-12-21 @ 10:00)  SpO2: 99% (05-12-21 @ 10:00)  Wt(kg): --  I&O's Detail    11 May 2021 07:01  -  12 May 2021 07:00  --------------------------------------------------------  IN:    Dexmedetomidine: 724.5 mL    Jevity 1.5: 120 mL    Phenylephrine: 340.1 mL  Total IN: 1184.6 mL    OUT:    Chest Tube (mL): 2 mL    Chest Tube (mL): 10 mL    Indwelling Catheter - Urethral (mL): 1250 mL  Total OUT: 1262 mL    Total NET: -77.4 mL      12 May 2021 07:01  -  12 May 2021 10:20  --------------------------------------------------------  IN:    Dexmedetomidine: 63 mL    Phenylephrine: 31.4 mL  Total IN: 94.4 mL    OUT:    Indwelling Catheter - Urethral (mL): 300 mL  Total OUT: 300 mL    Total NET: -205.6 mL        Mode: AC/ CMV (Assist Control/ Continuous Mandatory Ventilation)  RR (machine): 14  TV (machine): 400  FiO2: 40  PEEP: 5  ITime: 0.7  MAP: 11  PIP: 31        REVIEW OF SYSTEMS:    CONSTITUTIONAL:  No fevers, chills, sweats    HEENT:  Eyes:  No diplopia or blurred vision. ENT:  No earache, sore throat or runny nose.    CARDIOVASCULAR:  No pressure, squeezing, tightness, or heaviness about the chest; no palpitations.    RESPIRATORY:  Per HPI    GASTROINTESTINAL:  No abdominal pain, nausea, vomiting or diarrhea.    GENITOURINARY:  No dysuria, frequency or urgency.    NEUROLOGIC:  No paresthesias, fasciculations, seizures or weakness.    PSYCHIATRIC:  No disorder of thought or mood.      PHYSICAL EXAM:    Constitutional: Well developed and nourished  Eyes:Perrla  ENMT: normal  Neck:supple  Respiratory: Ronchi bilaterally  Cardiovascular: S1 S2 regular  Gastrointestinal: Soft, Non tender  Extremities: No edema  Vascular:normal  Neurological:Awake, alert,Ox3  Musculoskeletal:Normal      MEDICATIONS  (STANDING):  ascorbic acid 1000 milliGRAM(s) Oral daily  BACItracin   Ointment 1 Application(s) Topical two times a day  bisacodyl Suppository 10 milliGRAM(s) Rectal daily  chlorhexidine 0.12% Liquid 15 milliLiter(s) Oral Mucosa every 12 hours  chlorhexidine 2% Cloths 1 Application(s) Topical <User Schedule>  clonazePAM  Tablet 3 milliGRAM(s) Oral every 8 hours  clonazePAM  Tablet 1 milliGRAM(s) Oral once  doxazosin 1 milliGRAM(s) Oral at bedtime  enoxaparin Injectable 40 milliGRAM(s) SubCutaneous every 24 hours  furosemide   Injectable 40 milliGRAM(s) IV Push daily  insulin lispro (ADMELOG) corrective regimen sliding scale   SubCutaneous every 6 hours  lactulose Syrup 10 Gram(s) Oral daily  methadone    Tablet 5 milliGRAM(s) Oral every 8 hours  metoclopramide   Syrup 10 milliGRAM(s) Oral every 6 hours  midodrine 20 milliGRAM(s) Oral every 8 hours  pantoprazole   Suspension 40 milliGRAM(s) Oral daily  phenylephrine    Infusion 1 MICROgram(s)/kG/Min (15.7 mL/Hr) IV Continuous <Continuous>  polyethylene glycol 3350 17 Gram(s) Oral two times a day  potassium phosphate / sodium phosphate Powder (PHOS-NaK) 2 Packet(s) Oral once  predniSONE   Tablet 30 milliGRAM(s) Oral daily  QUEtiapine 75 milliGRAM(s) Oral two times a day  QUEtiapine 25 milliGRAM(s) Oral once  trimethoprim  160 mG/sulfamethoxazole 800 mG 1 Tablet(s) Oral <User Schedule>    MEDICATIONS  (PRN):  acetaminophen    Suspension .. 650 milliGRAM(s) Oral every 12 hours PRN Temp greater or equal to 38C (100.4F)  ALBUTerol    90 MICROgram(s) HFA Inhaler 2 Puff(s) Inhalation every 6 hours PRN Shortness of Breath and/or Wheezing  artificial  tears Solution 1 Drop(s) Both EYES every 4 hours PRN Dry Eyes  LORazepam   Injectable 2 milliGRAM(s) IV Push every 6 hours PRN Agitation  sodium chloride 0.9% lock flush 10 milliLiter(s) IV Push every 1 hour PRN Pre/post blood products, medications, blood draw, and to maintain line patency      Allergies    No Known Allergies    Intolerances        LABS:                        9.1    10.27 )-----------( 321      ( 12 May 2021 04:35 )             28.5     05-12    143  |  105  |  29<H>  ----------------------------<  106<H>  3.5   |  35<H>  |  0.33<L>    Ca    9.1      12 May 2021 04:35  Phos  2.2     05-12  Mg     2.4     05-12    TPro  6.9  /  Alb  2.8<L>  /  TBili  1.0  /  DBili  x   /  AST  29  /  ALT  36  /  AlkPhos  81  05-12        ABG - ( 12 May 2021 04:27 )  pH, Arterial: 7.45  pH, Blood: x     /  pCO2: 58    /  pO2: 87    / HCO3: 40    / Base Excess: 13.6  /  SaO2: 98                    CAPILLARY BLOOD GLUCOSE      POCT Blood Glucose.: 122 mg/dL (12 May 2021 06:42)  POCT Blood Glucose.: 103 mg/dL (11 May 2021 23:17)  POCT Blood Glucose.: 132 mg/dL (11 May 2021 16:53)  POCT Blood Glucose.: 193 mg/dL (11 May 2021 10:42)        RADIOLOGY & ADDITIONAL TESTS:    CXR:  < from: Xray Chest 1 View- PORTABLE-Routine (Xray Chest 1 View- PORTABLE-Routine in AM.) (05.11.21 @ 09:08) >  IMPRESSION: Unchanged chest as above.    < end of copied text >    Ct scan chest:    ekg;    echo:

## 2021-05-12 NOTE — PROGRESS NOTE ADULT - ASSESSMENT
Assessment and plan: 56 y/o M with no PMH is admitted to ICU for AHRF 2/2 covid19 pna     1. Acute hypoxic respiratory failure  2. ARDS 2/2 Covid pneumonia  3. Pneumothorax  4. Prediabetes  5. Abnormal TSH     Neuro  -Alert and oriented x 3 at baseline   -Sedated on trach  -Continue Precedex   -Continue Klonopin  -Seroquel 50mg BID added  -Restart methadone for concern for opiate withdrawal  -Ativan and also fentanyl PRN  -CT head unremarkable     Cardiovascular  # Hypotension   On phenylephrine  Increase midodrine to 20mg tid    # TACHYCARDIA: recurrent episodes   -HR in 110's  -Ativan PRN for agitation  -Continue monitoring      Pulm  #Acute hypoxic respiratory failure: secondary to Covid19 pneumonia  #ARDS  -Was on HFNC then got intubated 3/29  -Trach 4/22 continues on Vent   - Remdesivir was discontinued due to positive antibodies   - Finished Dexamethasone   - Covid19 PCR negative now, off isolation   - Daily SBT, difficulty tolerating 2/2 to agitation    # Bacterial Pneumonia  - Stable infiltrate on CXR ,likely developed Bacterial pneumonia   - Completed ABx Zosyn, meropenem, s/p 1 dose of Vancomycin  - MRSA negative from 3/26  - Sputum Cx growing few gram negative rods, CRE - Contact isolation  - Taper prednisone to 30 daily (5/11) for possible organizing pneumonia   - C/w bactrim empirically, TIW for PCP PPX  - Secretions from the trach, needs frequent suctions   - Pulm. Dr. Ryan  - ID Dr Anand     #Pneumothorax and pneumomediastinum:   -Thoracic surgery following  -s/p Chest tube placed 4/8 pigtail to wall suction and d/c on 4/15  -On 4/18 chest tube replaced for recurrence of PTX- c/w chest tube to suction     -anterior chest tube placed 5/6 for worsening pneumothorax with resolution of PTX  5/10 CXR with recurrence of apical pneumo - tube adjusted with improvement however still persisting    ID  Likely bacterial pneumonia   -leukocytosis uptrend WBC 12k  -No more fever, On Tylenol PRN only   -Completed ABX course  -plan as above     Nephro  -Pitting edema to both arms, ecchymosis on right AC, blisters on left arm around brachial area    -Was retaining urine, andrea was placed 5/5  -monitor I/Os   -Completed albumin  Metabolic alkalosis improving  Taper lasix 40 IV to QD  Completed diamox   AVOID IV FLUIDS    GI  Transaminitis:   likely secondary to Covid19, Resolved   hepatitis panel -ve  -continue to monitor LFT    Ileus  Abd xray with ileus  NPO - stop feeds for now  C/w Miralax BID standing - added dulcolax suppository and reglan   Added Lactulose daily as constipated  G tube 4/23,     Heme  Elevated d-dimer: likely secondary to Covid19   -D-dimer 423 on admission  -Last D-dimer 1434  - No active bleeding, restarted lovenox daily    Anemia  S/p 4 PRBC total  - Now Hg stable 7-9  CTH and CT abdomen w/o contrast no evidence of bleed    No active signs of bleeding (No hematemesis, melena or hematuria)  Hemolysis w/u- negative  Iron studies show AECD  Dr Andrade on board   F/u CBC daily     Endocrine  Prediabetes:  -A1c 5.8  -BS controlled  -continue HSS    Abnormal TSH:  -TSH level noted 0.26,   -Repeat TSH 0.37 and Free T4 1.82    Skin/Catheters  No rashes. Peripheral IV lines.   LIJ  Both arms with pitting edema, right AC with ecchymosis and left AC with blisters     Prophylaxis   On Lovenox for DVT   Protonix for GI proph    GOC  FULL CODE Assessment and plan: 56 y/o M with no PMH is admitted to ICU for AHRF 2/2 covid19 pna     1. Acute hypoxic respiratory failure  2. ARDS 2/2 Covid pneumonia  3. Pneumothorax  4. Prediabetes  5. Abnormal TSH     Neuro  -Alert and oriented x 3 at baseline   -Sedated on trach  -Titrate down Precedex   -Increase Klonopin to 3mg q8h  -Increase Seroquel 75 mg BID   -Restart methadone for concern for opiate withdrawal  -Ativan PRN  -CT head unremarkable     Cardiovascular  # Hypotension   On phenylephrine  C/w midodrine to 20mg tid    # TACHYCARDIA: recurrent episodes   -HR can go to 130-150 while agitated  -Ativan PRN for agitation  -Continue monitoring      Pulm  #Acute hypoxic respiratory failure: secondary to Covid19 pneumonia  #ARDS  -Was on HFNC then got intubated 3/29  -Trach 4/22 continues on Vent   - Remdesivir was discontinued due to positive antibodies   - Finished Dexamethasone   - Covid19 PCR negative now, off isolation   - Daily SBT, difficulty tolerating 2/2 to agitation    # Bacterial Pneumonia  - Stable infiltrate on CXR ,likely developed Bacterial pneumonia   - Completed ABx Zosyn, meropenem, s/p 1 dose of Vancomycin  - MRSA negative from 3/26  - Sputum Cx growing few gram negative rods, CRE - Contact isolation  - Taper prednisone to 30 daily (5/11) for possible organizing pneumonia   - C/w bactrim empirically, TIW for PCP PPX  - Secretions from the trach, needs frequent suctions   - Pulm. Dr. Ryan  - ID Dr Anand     #Pneumothorax and pneumomediastinum:   -Thoracic surgery following  -s/p Chest tube placed 4/8 pigtail to wall suction and d/c on 4/15  -On 4/18 chest tube replaced for recurrence of PTX- c/w chest tube to suction     -anterior chest tube placed 5/6 for worsening pneumothorax with resolution of PTX  5/10 CXR with recurrence of apical pneumo - tube adjusted with improvement however still persisting but stable    ID  Likely bacterial pneumonia   -leukocytosis stable 10k-12k  -Febrile o/n On Tylenol PRN only   -Completed ABX course  -plan as above     Nephro  -Pitting edema to both arms, ecchymosis on right AC, blisters on left arm around brachial area    -Was retaining urine, andrea was placed 5/5  -monitor I/Os   -Completed albumin  Metabolic alkalosis improving  Taper lasix 40 IV to QD  Completed diamox   AVOID IV FLUIDS    GI  Transaminitis:   likely secondary to Covid19, Resolved   hepatitis panel -ve  -continue to monitor LFT    Ileus  Abd xray with ileus  NPO - stop feeds for now  C/w Miralax BID standing - added dulcolax suppository and reglan   Added Lactulose daily as constipated  G tube 4/23,     Heme  Elevated d-dimer: likely secondary to Covid19   -D-dimer 423 on admission  -Last D-dimer 1434  - No active bleeding, restarted lovenox daily    Anemia  S/p 4 PRBC total  - Now Hg stable 7-9  CTH and CT abdomen w/o contrast no evidence of bleed    No active signs of bleeding (No hematemesis, melena or hematuria)  Hemolysis w/u- negative  Iron studies show AECD  Dr Andrade on board   F/u CBC daily     Endocrine  Prediabetes:  -A1c 5.8  -BS controlled  -continue HSS    Abnormal TSH:  -TSH level noted 0.26,   -Repeat TSH 0.37 and Free T4 1.82    Skin/Catheters  No rashes. Peripheral IV lines.   LIJ  Both arms with pitting edema, right AC with ecchymosis and left AC with blisters     Prophylaxis   On Lovenox for DVT   Protonix for GI proph    GOC  FULL CODE Assessment and plan: 56 y/o M with no PMH is admitted to ICU for AHRF 2/2 covid19 pna     1. Acute hypoxic respiratory failure  2. ARDS 2/2 Covid pneumonia  3. Pneumothorax  4. Prediabetes  5. Abnormal TSH     Neuro  -Alert and oriented x 3 at baseline   -Sedated on trach  -Titrate down Precedex   -Increase Klonopin to 3mg q8h  -Increase Seroquel 75 mg BID   -Restart methadone for concern for opiate withdrawal  -Ativan PRN  -CT head unremarkable     Cardiovascular  # Hypotension   On phenylephrine  C/w midodrine to 20mg tid    # TACHYCARDIA: recurrent episodes   -HR can go to 130-150 while agitated  -Ativan PRN for agitation  -Continue monitoring      Pulm  #Acute hypoxic respiratory failure: secondary to Covid19 pneumonia  #ARDS  -Was on HFNC then got intubated 3/29  -Trach 4/22 continues on Vent   - Remdesivir was discontinued due to positive antibodies   - Finished Dexamethasone   - Covid19 PCR negative now, off isolation   - Daily SBT, difficulty tolerating 2/2 to agitation    # Bacterial Pneumonia  - Stable infiltrate on CXR ,likely developed Bacterial pneumonia   - Completed ABx Zosyn, meropenem, s/p 1 dose of Vancomycin  - MRSA negative from 3/26  - Sputum Cx growing few gram negative rods, CRE - Contact isolation  - Taper prednisone to 30 daily (5/11) for possible organizing pneumonia   - C/w bactrim empirically, TIW for PCP PPX  - Secretions from the trach, needs frequent suctions   - Pulm. Dr. Ryan  - ID Dr Anand     #Pneumothorax and pneumomediastinum:   -Thoracic surgery following  -s/p Chest tube placed 4/8 pigtail to wall suction and d/c on 4/15  -On 4/18 chest tube replaced for recurrence of PTX- c/w chest tube to suction     -anterior chest tube placed 5/6 for worsening pneumothorax with resolution of PTX  5/10 CXR with recurrence of apical pneumo - tube adjusted with improvement however still persisting but stable    ID  Likely bacterial pneumonia   -leukocytosis stable 10k-12k  -Febrile o/n   -Tylenol PRN   -Completed ABX course  -plan as above     Nephro  -Pitting edema to both arms, ecchymosis on right AC, blisters on left arm around brachial area    -Was retaining urine, andrea was placed 5/5  -monitor I/Os   -Completed albumin  Metabolic alkalosis improving  Taper lasix 40 IV to QD  Completed diamox   AVOID IV FLUIDS    GI  Transaminitis:   likely secondary to Covid19, Resolved   hepatitis panel -ve  -continue to monitor LFT    Ileus  Abd xray with ileus  NPO - stop feeds for now  C/w Miralax BID standing - added dulcolax suppository and reglan   Added Lactulose daily as constipated  G tube 4/23,     Heme  Elevated d-dimer: likely secondary to Covid19   -D-dimer 423 on admission  -Last D-dimer 1434  - No active bleeding, restarted lovenox daily    Anemia  S/p 4 PRBC total  - Now Hg stable 7-9  CTH and CT abdomen w/o contrast no evidence of bleed    No active signs of bleeding (No hematemesis, melena or hematuria)  Hemolysis w/u- negative  Iron studies show AECD  Dr Andrade on board   F/u CBC daily     Endocrine  Prediabetes:  -A1c 5.8  -BS controlled  -continue HSS    Abnormal TSH:  -TSH level noted 0.26,   -Repeat TSH 0.37 and Free T4 1.82    Skin/Catheters  No rashes. Peripheral IV lines.   LIJ  Both arms with pitting edema, right AC with ecchymosis and left AC with blisters     Prophylaxis   On Lovenox for DVT   Protonix for GI proph    GOC  FULL CODE Assessment and plan: 56 y/o M with no PMH is admitted to ICU for AHRF 2/2 covid19 pna     1. Acute hypoxic respiratory failure  2. ARDS 2/2 Covid pneumonia  3. Pneumothorax  4. Prediabetes  5. Abnormal TSH     Neuro  -Alert and oriented x 3 at baseline   -Sedated on trach  -Titrate down Precedex   -Increase Klonopin to 3mg q8h  -Increase Seroquel 75 mg BID   -Restart methadone for concern for opiate withdrawal  -Ativan PRN  -CT head unremarkable     Cardiovascular  # Hypotension   On phenylephrine  C/w midodrine to 20mg tid    # TACHYCARDIA: recurrent episodes   -HR can go to 130-150 while agitated  -Ativan PRN for agitation  -Continue monitoring      Pulm  #Acute hypoxic respiratory failure: secondary to Covid19 pneumonia  #ARDS  -Was on HFNC then got intubated 3/29  -Trach 4/22 continues on Vent   - Remdesivir was discontinued due to positive antibodies   - Finished Dexamethasone   - Covid19 PCR negative now, off isolation   - Daily SBT, difficulty tolerating 2/2 to agitation    # Bacterial Pneumonia  - Stable infiltrate on CXR ,likely developed Bacterial pneumonia   - Completed ABx Zosyn, meropenem, s/p 1 dose of Vancomycin  - MRSA negative from 3/26  - Sputum Cx growing few gram negative rods, CRE - Contact isolation  - Taper prednisone to 30 daily (5/11) for possible organizing pneumonia   - C/w bactrim empirically, TIW for PCP PPX  - Secretions from the trach, needs frequent suctions   - Pulm. Dr. Ryan  - ID Dr Anand     #Pneumothorax and pneumomediastinum:   -Thoracic surgery following  -s/p Chest tube placed 4/8 pigtail to wall suction and d/c on 4/15  -On 4/18 chest tube replaced for recurrence of PTX- c/w chest tube to suction     -anterior chest tube placed 5/6 for worsening pneumothorax with resolution of PTX  5/10 CXR with recurrence of apical pneumo - tube adjusted with improvement however still persisting but stable    ID  Likely bacterial pneumonia   -leukocytosis stable 10k-12k  -Febrile o/n   -Tylenol PRN   -Completed ABX course  -plan as above     Nephro  -Pitting edema to both arms, ecchymosis on right AC, blisters on left arm around brachial area    -Was retaining urine, andrea was placed 5/5  -monitor I/Os   -Completed albumin  Metabolic alkalosis stable  Taper lasix 40 IV to QD  Completed diamox     GI  Transaminitis:   likely secondary to Covid19, Resolved   hepatitis panel -ve  -continue to monitor LFT    Ileus  Abd xray with ileus  Restart Feeds as now having large BMs  C/w Miralax BID standing - added dulcolax suppository and reglan   G tube 4/23, - dressing changed daily for seroma    Heme  Elevated d-dimer: likely secondary to Covid19   -D-dimer 423 on admission  -Last D-dimer 1434  - No active bleeding, restarted lovenox daily    Anemia  S/p 4 PRBC total  - Now Hg stable 7-9  CTH and CT abdomen w/o contrast no evidence of bleed    No active signs of bleeding (No hematemesis, melena or hematuria)  Hemolysis w/u- negative  Iron studies show AECD  Dr Andrade on board   F/u CBC daily     Endocrine  Prediabetes:  -A1c 5.8  -BS controlled  -continue HSS    Abnormal TSH:  -TSH level noted 0.26,   -Repeat TSH 0.37 and Free T4 1.82    Skin/Catheters  No rashes. Peripheral IV lines.   LIJ  Both arms with edema, right AC with ecchymosis    Prophylaxis   On Lovenox for DVT   Protonix for GI proph    GOC  FULL CODE

## 2021-05-12 NOTE — CHART NOTE - NSCHARTNOTEFT_GEN_A_CORE
Assessment:   Patient is a 55y old  Male who presents with a chief complaint of SOB (12 May 2021 11:42). Pt intubated. s/p trach , s/p GT . Seen, in AM (NPO). TF re-ordered, flow record indicates 10 ml/hr.       Factors impacting intake: [ ] none [ ] nausea  [ ] vomiting [ ] diarrhea [ ] constipation  [ ]chewing problems [ ] swallowing issues  [x ] other: critical illness prolonged, trach/ PEG    Diet Prescription: Diet, NPO with Tube Feed:   Tube Feeding Modality: Gastrostomy  Jevity 1.5 Leonardo  Total Volume for 24 Hours (mL): 960  Continuous  Starting Tube Feed Rate {mL per Hour}: 10  Increase Tube Feed Rate by (mL): 10     Every hour  Until Goal Tube Feed Rate (mL per Hour): 40  Tube Feed Duration (in Hours): 24  Tube Feed Start Time: 01:00  No Carb Prosource (1pkg = 15gms Protein)     Qty per Day:  1 (21 @ 12:08)        Daily     Daily Weight in k.1 (12 May 2021 07:00)  Weight in k.8 (11 May 2021 07:00)  Weight in k.3 (08 May 2021 08:00)  Weight in k.9 (07 May 2021 08:00)  Weight in k.3 (06 May 2021 07:00)  Weight in k.5 (05 May 2021 07:00)  Weight in k.5 (04 May 2021 07:00)  Weight in k.7 (03 May 2021 07:00)  Weight in k.8 (02 May 2021 07:00)  Weight in k.9 (01 May 2021 07:00)  Weight in k.1 (2021 07:00)    % Weight Change    Pertinent Medications: MEDICATIONS  (STANDING):  ascorbic acid 1000 milliGRAM(s) Oral daily  BACItracin   Ointment 1 Application(s) Topical two times a day  bisacodyl Suppository 10 milliGRAM(s) Rectal daily  chlorhexidine 0.12% Liquid 15 milliLiter(s) Oral Mucosa every 12 hours  chlorhexidine 2% Cloths 1 Application(s) Topical <User Schedule>  clonazePAM  Tablet 3 milliGRAM(s) Oral every 8 hours  doxazosin 1 milliGRAM(s) Oral at bedtime  enoxaparin Injectable 40 milliGRAM(s) SubCutaneous every 24 hours  furosemide   Injectable 40 milliGRAM(s) IV Push daily  insulin lispro (ADMELOG) corrective regimen sliding scale   SubCutaneous every 6 hours  methadone    Tablet 5 milliGRAM(s) Oral every 8 hours  metoclopramide   Syrup 10 milliGRAM(s) Oral every 6 hours  midodrine 20 milliGRAM(s) Oral every 8 hours  pantoprazole   Suspension 40 milliGRAM(s) Oral daily  phenylephrine    Infusion 1 MICROgram(s)/kG/Min (15.7 mL/Hr) IV Continuous <Continuous>  polyethylene glycol 3350 17 Gram(s) Oral two times a day  predniSONE   Tablet 30 milliGRAM(s) Oral daily  QUEtiapine 75 milliGRAM(s) Oral two times a day  trimethoprim  160 mG/sulfamethoxazole 800 mG 1 Tablet(s) Oral <User Schedule>    MEDICATIONS  (PRN):  acetaminophen    Suspension .. 650 milliGRAM(s) Oral every 12 hours PRN Temp greater or equal to 38C (100.4F)  ALBUTerol    90 MICROgram(s) HFA Inhaler 2 Puff(s) Inhalation every 6 hours PRN Shortness of Breath and/or Wheezing  artificial  tears Solution 1 Drop(s) Both EYES every 4 hours PRN Dry Eyes  LORazepam   Injectable 2 milliGRAM(s) IV Push every 6 hours PRN Agitation  sodium chloride 0.9% lock flush 10 milliLiter(s) IV Push every 1 hour PRN Pre/post blood products, medications, blood draw, and to maintain line patency    Pertinent Labs:  Na143 mmol/L Glu 106 mg/dL<H> K+ 3.5 mmol/L Cr  0.33 mg/dL<L> BUN 29 mg/dL<H>  Phos 2.2 mg/dL<L>  Alb 2.8 g/dL<L>  Chol 165 mg/dL LDL --    HDL 26 mg/dL<L> Trig 414 mg/dL<H>     CAPILLARY BLOOD GLUCOSE      POCT Blood Glucose.: 162 mg/dL (12 May 2021 10:43)  POCT Blood Glucose.: 122 mg/dL (12 May 2021 06:42)  POCT Blood Glucose.: 103 mg/dL (11 May 2021 23:17)  POCT Blood Glucose.: 132 mg/dL (11 May 2021 16:53)        Previous Nutrition Diagnosis:   [ ] Altered GI function  [ ]Inadequate Oral Intake [ ] Swallowing Difficulty   [ ] Altered nutrition related labs [ ] Increased Nutrient Needs [ ] Overweight/Obesity   [ ] Unintended Weight Loss [ ] Food & Nutrition Related Knowledge Deficit [x ] Malnutrition (severe PCM)  [ ] Other:     Nutrition Diagnosis is [x ] ongoing  [ ] resolved [ ] not applicable       Interventions:   Recommend  [ ] Change Diet To:  [ ] Nutrition Supplement  [x ] Nutrition Support: Jevity 1.5 40x24 + 1 Pkt Prosource BID. MD to monitor. RD available.   [ ] Other:     Monitoring and Evaluation:    [ x ] Tolerance to diet prescription [ x ] weights [ x ] labs[ x ] follow up per protocol  [ ] other:

## 2021-05-12 NOTE — CHART NOTE - NSCHARTNOTEFT_GEN_A_CORE
ID [id number]  Called and updated Pt's HCP  winn  about current clinical course and treatment plan. All questions and concerns were answered and addressed.

## 2021-05-12 NOTE — PROGRESS NOTE ADULT - SUBJECTIVE AND OBJECTIVE BOX
INTERVAL HPI/OVERNIGHT EVENTS:    Pt seen and examined at bedside. No acute events overnight.   On pressors support.     ICU Vital Signs Last 24 Hrs  T(C): 37.2 (12 May 2021 07:00), Max: 38.2 (12 May 2021 00:03)  T(F): 99 (12 May 2021 07:00), Max: 100.7 (12 May 2021 00:03)  HR: 131 (12 May 2021 11:00) (67 - 143)  BP: 114/69 (12 May 2021 11:00) (85/63 - 153/76)  BP(mean): 76 (12 May 2021 11:00) (58 - 109)  ABP: --  ABP(mean): --  RR: 42 (12 May 2021 11:00) (16 - 47)  SpO2: 100% (12 May 2021 11:00) (97% - 100%)    I&O's Detail    11 May 2021 07:01  -  12 May 2021 07:00  --------------------------------------------------------  IN:    Dexmedetomidine: 724.5 mL    Jevity 1.5: 120 mL    Phenylephrine: 340.1 mL  Total IN: 1184.6 mL    OUT:    Chest Tube (mL): 2 mL    Chest Tube (mL): 10 mL    Indwelling Catheter - Urethral (mL): 1250 mL  Total OUT: 1262 mL    Total NET: -77.4 mL      12 May 2021 07:01  -  12 May 2021 11:44  --------------------------------------------------------  IN:    Dexmedetomidine: 63 mL    Phenylephrine: 31.4 mL  Total IN: 94.4 mL    OUT:    Indwelling Catheter - Urethral (mL): 300 mL  Total OUT: 300 mL                Physical Exam  Pulm: intubated  Chest: L anterior pigtail no airleak; posterior CT no airleak, to suction  Abdomen: Soft, nondistended, G tube in place securely on feeds, prior seroma i&d site packed with WTD      Labs:                                   9.1    10.27 )-----------( 321      ( 12 May 2021 04:35 )             28.5       05-12    143  |  105  |  29<H>  ----------------------------<  106<H>  3.5   |  35<H>  |  0.33<L>    Ca    9.1      12 May 2021 04:35  Phos  2.2     05-12  Mg     2.4     05-12    TPro  6.9  /  Alb  2.8<L>  /  TBili  1.0  /  DBili  x   /  AST  29  /  ALT  36  /  AlkPhos  81  05-12

## 2021-05-12 NOTE — PROGRESS NOTE ADULT - ASSESSMENT
54 yo M s/p left anterior pigtail chest tube 5/6 and left pigtail chest tube 4/18. S/p Tracheostomy 4/21, s/p G-tube placement 4/23 with seroma.    - Daily dressing changes  -Care as per ICU

## 2021-05-13 DIAGNOSIS — F13.20 SEDATIVE, HYPNOTIC OR ANXIOLYTIC DEPENDENCE, UNCOMPLICATED: ICD-10-CM

## 2021-05-13 DIAGNOSIS — F11.23 OPIOID DEPENDENCE WITH WITHDRAWAL: ICD-10-CM

## 2021-05-13 LAB
ALBUMIN SERPL ELPH-MCNC: 3 G/DL — LOW (ref 3.5–5)
ALP SERPL-CCNC: 79 U/L — SIGNIFICANT CHANGE UP (ref 40–120)
ALT FLD-CCNC: 59 U/L DA — SIGNIFICANT CHANGE UP (ref 10–60)
ANION GAP SERPL CALC-SCNC: 2 MMOL/L — LOW (ref 5–17)
AST SERPL-CCNC: 37 U/L — SIGNIFICANT CHANGE UP (ref 10–40)
BASE EXCESS BLDA CALC-SCNC: 7 MMOL/L — HIGH (ref -2–3)
BILIRUB SERPL-MCNC: 1.2 MG/DL — SIGNIFICANT CHANGE UP (ref 0.2–1.2)
BLOOD GAS COMMENTS ARTERIAL: SIGNIFICANT CHANGE UP
BUN SERPL-MCNC: 36 MG/DL — HIGH (ref 7–18)
CALCIUM SERPL-MCNC: 8.4 MG/DL — SIGNIFICANT CHANGE UP (ref 8.4–10.5)
CHLORIDE SERPL-SCNC: 109 MMOL/L — HIGH (ref 96–108)
CO2 SERPL-SCNC: 35 MMOL/L — HIGH (ref 22–31)
CREAT SERPL-MCNC: 0.61 MG/DL — SIGNIFICANT CHANGE UP (ref 0.5–1.3)
GLUCOSE BLDC GLUCOMTR-MCNC: 110 MG/DL — HIGH (ref 70–99)
GLUCOSE BLDC GLUCOMTR-MCNC: 123 MG/DL — HIGH (ref 70–99)
GLUCOSE BLDC GLUCOMTR-MCNC: 129 MG/DL — HIGH (ref 70–99)
GLUCOSE BLDC GLUCOMTR-MCNC: 131 MG/DL — HIGH (ref 70–99)
GLUCOSE BLDC GLUCOMTR-MCNC: 142 MG/DL — HIGH (ref 70–99)
GLUCOSE SERPL-MCNC: 109 MG/DL — HIGH (ref 70–99)
HCO3 BLDA-SCNC: 35 MMOL/L — HIGH (ref 21–28)
HCT VFR BLD CALC: 25.8 % — LOW (ref 39–50)
HGB BLD-MCNC: 8.9 G/DL — LOW (ref 13–17)
HOROWITZ INDEX BLDA+IHG-RTO: 40 — SIGNIFICANT CHANGE UP
MAGNESIUM SERPL-MCNC: 2 MG/DL — SIGNIFICANT CHANGE UP (ref 1.6–2.6)
MCHC RBC-ENTMCNC: 34.5 GM/DL — SIGNIFICANT CHANGE UP (ref 32–36)
MCHC RBC-ENTMCNC: 38.5 PG — HIGH (ref 27–34)
MCV RBC AUTO: 111.7 FL — HIGH (ref 80–100)
NRBC # BLD: 0 /100 WBCS — SIGNIFICANT CHANGE UP (ref 0–0)
PCO2 BLDA: 67 MMHG — HIGH (ref 35–48)
PH BLDA: 7.33 — LOW (ref 7.35–7.45)
PHOSPHATE SERPL-MCNC: 4.3 MG/DL — SIGNIFICANT CHANGE UP (ref 2.5–4.5)
PLATELET # BLD AUTO: 316 K/UL — SIGNIFICANT CHANGE UP (ref 150–400)
PO2 BLDA: 107 MMHG — SIGNIFICANT CHANGE UP (ref 83–108)
POTASSIUM SERPL-MCNC: 3.3 MMOL/L — LOW (ref 3.5–5.3)
POTASSIUM SERPL-SCNC: 3.3 MMOL/L — LOW (ref 3.5–5.3)
PROT SERPL-MCNC: 6.8 G/DL — SIGNIFICANT CHANGE UP (ref 6–8.3)
RBC # BLD: 2.31 M/UL — LOW (ref 4.2–5.8)
RBC # FLD: 20.1 % — HIGH (ref 10.3–14.5)
SAO2 % BLDA: 98 % — SIGNIFICANT CHANGE UP
SODIUM SERPL-SCNC: 146 MMOL/L — HIGH (ref 135–145)
WBC # BLD: 10.9 K/UL — HIGH (ref 3.8–10.5)
WBC # FLD AUTO: 10.9 K/UL — HIGH (ref 3.8–10.5)

## 2021-05-13 PROCEDURE — 71250 CT THORAX DX C-: CPT | Mod: 26

## 2021-05-13 PROCEDURE — 99291 CRITICAL CARE FIRST HOUR: CPT

## 2021-05-13 PROCEDURE — 74018 RADEX ABDOMEN 1 VIEW: CPT | Mod: 26

## 2021-05-13 PROCEDURE — 71045 X-RAY EXAM CHEST 1 VIEW: CPT | Mod: 26

## 2021-05-13 PROCEDURE — 74176 CT ABD & PELVIS W/O CONTRAST: CPT | Mod: 26

## 2021-05-13 PROCEDURE — 99223 1ST HOSP IP/OBS HIGH 75: CPT

## 2021-05-13 RX ORDER — IOHEXOL 300 MG/ML
30 INJECTION, SOLUTION INTRAVENOUS ONCE
Refills: 0 | Status: COMPLETED | OUTPATIENT
Start: 2021-05-13 | End: 2021-05-13

## 2021-05-13 RX ORDER — ONDANSETRON 8 MG/1
4 TABLET, FILM COATED ORAL ONCE
Refills: 0 | Status: COMPLETED | OUTPATIENT
Start: 2021-05-13 | End: 2021-05-13

## 2021-05-13 RX ORDER — POTASSIUM CHLORIDE 20 MEQ
10 PACKET (EA) ORAL
Refills: 0 | Status: COMPLETED | OUTPATIENT
Start: 2021-05-13 | End: 2021-05-13

## 2021-05-13 RX ORDER — MIDAZOLAM HYDROCHLORIDE 1 MG/ML
5 INJECTION, SOLUTION INTRAMUSCULAR; INTRAVENOUS ONCE
Refills: 0 | Status: DISCONTINUED | OUTPATIENT
Start: 2021-05-13 | End: 2021-05-13

## 2021-05-13 RX ORDER — DEXMEDETOMIDINE HYDROCHLORIDE IN 0.9% SODIUM CHLORIDE 4 UG/ML
1.5 INJECTION INTRAVENOUS
Qty: 400 | Refills: 0 | Status: DISCONTINUED | OUTPATIENT
Start: 2021-05-13 | End: 2021-05-14

## 2021-05-13 RX ADMIN — Medication 1 APPLICATION(S): at 05:13

## 2021-05-13 RX ADMIN — Medication 3 MILLIGRAM(S): at 09:08

## 2021-05-13 RX ADMIN — Medication 100 MILLIEQUIVALENT(S): at 11:51

## 2021-05-13 RX ADMIN — PIPERACILLIN AND TAZOBACTAM 25 GRAM(S): 4; .5 INJECTION, POWDER, LYOPHILIZED, FOR SOLUTION INTRAVENOUS at 05:11

## 2021-05-13 RX ADMIN — DEXMEDETOMIDINE HYDROCHLORIDE IN 0.9% SODIUM CHLORIDE 31.5 MICROGRAM(S)/KG/HR: 4 INJECTION INTRAVENOUS at 11:00

## 2021-05-13 RX ADMIN — QUETIAPINE FUMARATE 75 MILLIGRAM(S): 200 TABLET, FILM COATED ORAL at 09:07

## 2021-05-13 RX ADMIN — Medication 100 MILLIEQUIVALENT(S): at 09:07

## 2021-05-13 RX ADMIN — QUETIAPINE FUMARATE 75 MILLIGRAM(S): 200 TABLET, FILM COATED ORAL at 18:02

## 2021-05-13 RX ADMIN — PANTOPRAZOLE SODIUM 40 MILLIGRAM(S): 20 TABLET, DELAYED RELEASE ORAL at 11:04

## 2021-05-13 RX ADMIN — Medication 166.67 MILLIGRAM(S): at 05:18

## 2021-05-13 RX ADMIN — ONDANSETRON 4 MILLIGRAM(S): 8 TABLET, FILM COATED ORAL at 05:26

## 2021-05-13 RX ADMIN — Medication 10 MILLIGRAM(S): at 09:09

## 2021-05-13 RX ADMIN — Medication 30 MILLIGRAM(S): at 09:08

## 2021-05-13 RX ADMIN — Medication 0.2 MILLIGRAM(S): at 23:03

## 2021-05-13 RX ADMIN — POLYETHYLENE GLYCOL 3350 17 GRAM(S): 17 POWDER, FOR SOLUTION ORAL at 09:07

## 2021-05-13 RX ADMIN — Medication 2 MILLIGRAM(S): at 06:55

## 2021-05-13 RX ADMIN — Medication 10 MILLIGRAM(S): at 23:03

## 2021-05-13 RX ADMIN — Medication 10 MILLIGRAM(S): at 00:18

## 2021-05-13 RX ADMIN — Medication 100 MILLIEQUIVALENT(S): at 11:16

## 2021-05-13 RX ADMIN — METHADONE HYDROCHLORIDE 30 MILLIGRAM(S): 40 TABLET ORAL at 13:46

## 2021-05-13 RX ADMIN — Medication 10 MILLIGRAM(S): at 18:02

## 2021-05-13 RX ADMIN — CHLORHEXIDINE GLUCONATE 1 APPLICATION(S): 213 SOLUTION TOPICAL at 05:11

## 2021-05-13 RX ADMIN — MIDODRINE HYDROCHLORIDE 20 MILLIGRAM(S): 2.5 TABLET ORAL at 09:09

## 2021-05-13 RX ADMIN — POLYETHYLENE GLYCOL 3350 17 GRAM(S): 17 POWDER, FOR SOLUTION ORAL at 18:04

## 2021-05-13 RX ADMIN — Medication 1 APPLICATION(S): at 18:02

## 2021-05-13 RX ADMIN — CHLORHEXIDINE GLUCONATE 15 MILLILITER(S): 213 SOLUTION TOPICAL at 05:29

## 2021-05-13 RX ADMIN — Medication 40 MILLIGRAM(S): at 05:11

## 2021-05-13 RX ADMIN — METHADONE HYDROCHLORIDE 30 MILLIGRAM(S): 40 TABLET ORAL at 09:07

## 2021-05-13 RX ADMIN — DEXMEDETOMIDINE HYDROCHLORIDE IN 0.9% SODIUM CHLORIDE 31.5 MICROGRAM(S)/KG/HR: 4 INJECTION INTRAVENOUS at 19:38

## 2021-05-13 RX ADMIN — Medication 10 MILLIGRAM(S): at 13:46

## 2021-05-13 RX ADMIN — CHLORHEXIDINE GLUCONATE 15 MILLILITER(S): 213 SOLUTION TOPICAL at 18:04

## 2021-05-13 RX ADMIN — IOHEXOL 30 MILLILITER(S): 300 INJECTION, SOLUTION INTRAVENOUS at 11:04

## 2021-05-13 RX ADMIN — DEXMEDETOMIDINE HYDROCHLORIDE IN 0.9% SODIUM CHLORIDE 31.5 MICROGRAM(S)/KG/HR: 4 INJECTION INTRAVENOUS at 13:46

## 2021-05-13 RX ADMIN — METHADONE HYDROCHLORIDE 30 MILLIGRAM(S): 40 TABLET ORAL at 23:03

## 2021-05-13 RX ADMIN — ENOXAPARIN SODIUM 40 MILLIGRAM(S): 100 INJECTION SUBCUTANEOUS at 11:04

## 2021-05-13 RX ADMIN — DEXMEDETOMIDINE HYDROCHLORIDE IN 0.9% SODIUM CHLORIDE 31.5 MICROGRAM(S)/KG/HR: 4 INJECTION INTRAVENOUS at 18:02

## 2021-05-13 RX ADMIN — Medication 3 MILLIGRAM(S): at 13:46

## 2021-05-13 RX ADMIN — Medication 3 MILLIGRAM(S): at 23:02

## 2021-05-13 RX ADMIN — Medication 0.2 MILLIGRAM(S): at 13:47

## 2021-05-13 RX ADMIN — Medication 1000 MILLIGRAM(S): at 11:04

## 2021-05-13 RX ADMIN — Medication 10 MILLIGRAM(S): at 11:04

## 2021-05-13 NOTE — PROGRESS NOTE ADULT - ASSESSMENT
Assessment and plan: 56 y/o M with no PMH is admitted to ICU for AHRF 2/2 covid19 pna     1. Acute hypoxic respiratory failure  2. ARDS 2/2 Covid pneumonia  3. Pneumothorax  4. Prediabetes  5. Abnormal TSH     Neuro  -Alert and oriented x 3 at baseline   -Off all drips  -Increase Klonopin to 3mg q8h  -Increase Seroquel 75 mg BID   -Restart methadone higher dosage to 30mg q8hr for concern for opiate withdrawal  -Ativan PRN, fentanyl PRN  -CT head unremarkable     Cardiovascular  # Hypotension   Off pressors  C/w midodrine to 20mg tid    # TACHYCARDIA: recurrent episodes   -HR can go to 130-150 while agitated - not likely cardiac source  -Ativan PRN for agitation  -Continue monitoring      Pulm  #Acute hypoxic respiratory failure: secondary to Covid19 pneumonia  #ARDS  -Was on HFNC then got intubated 3/29  -Trach 4/22 continues on Vent   - Remdesivir was discontinued due to positive antibodies   - Finished Dexamethasone   - Covid19 PCR negative now, off isolation   - Daily SBT, difficulty tolerating 2/2 to agitation    # Bacterial Pneumonia  - Stable infiltrate on CXR ,likely developed Bacterial pneumonia   - Completed ABx Zosyn, meropenem, s/p 1 dose of Vancomycin  - MRSA negative from 3/26  - Sputum Cx growing few gram negative rods, CRE - Contact isolation  - Taper prednisone to 30 daily (5/11) for possible organizing pneumonia   - C/w bactrim empirically, TIW for PCP PPX  - Secretions from the trach, needs frequent suctions   - Pulm. Dr. Ryan  - ID Dr Anand     #Pneumothorax and pneumomediastinum:   -Thoracic surgery following  -s/p Chest tube placed 4/8 pigtail to wall suction and d/c on 4/15  -On 4/18 chest tube replaced for recurrence of PTX- c/w chest tube to suction     -anterior chest tube placed 5/6 for worsening pneumothorax with resolution of PTX  5/10 CXR with recurrence of apical pneumo - tube adjusted with improvement however still persisting but stable    ID  Likely bacterial pneumonia   -leukocytosis stable 10k-12k  -Febrile o/n   -Tylenol PRN   -Completed ABX course  -plan as above     Fevers  Recurrent - o/n to 102.3  UA now positive - andrea changed  Started on Vanc/Zosyn empirically  Fu Abd and CXR    Nephro  -Pitting edema to both arms, ecchymosis on right AC, blisters on left arm around brachial area    -Was retaining urine, andrea was placed 5/5  -monitor I/Os   -Completed albumin  Metabolic alkalosis stable  Taper lasix 40 IV to QD  Completed diamox     GI  Transaminitis:   likely secondary to Covid19, Resolved   hepatitis panel -ve  -continue to monitor LFT    Ileus  Abd xray with ileus  Keep NPO now, TF on hold  Fu Repeat abd xray  C/w Miralax BID standing - added dulcolax suppository and reglan   G tube 4/23, - dressing changed daily for seroma    Heme  Elevated d-dimer: likely secondary to Covid19   -D-dimer 423 on admission  -Last D-dimer 1434  - No active bleeding, restarted lovenox daily    Anemia  S/p 4 PRBC total  - Now Hg stable 7-9  CTH and CT abdomen w/o contrast no evidence of bleed    No active signs of bleeding (No hematemesis, melena or hematuria)  Hemolysis w/u- negative  Iron studies show AECD  Dr Andrade on board   F/u CBC daily     Endocrine  Prediabetes:  -A1c 5.8  -BS controlled  -continue HSS    Abnormal TSH:  -TSH level noted 0.26,   -Repeat TSH 0.37 and Free T4 1.82    Skin/Catheters  No rashes. Peripheral IV lines.   LIJ  Both arms with edema, right AC with ecchymosis    Prophylaxis   On Lovenox for DVT   Protonix for GI proph    GOC  FULL CODE Assessment and plan: 54 y/o M with no PMH is admitted to ICU for AHRF 2/2 covid19 pna     1. Acute hypoxic respiratory failure  2. ARDS 2/2 Covid pneumonia  3. Pneumothorax  4. Prediabetes  5. Abnormal TSH     Neuro  -Alert and oriented x 3 at baseline   -Off all drips overnight, however concern for opiate and precedex withdrawal   -Increase Klonopin to 3mg q8h  -Increase Seroquel 75 mg BID   -Restart methadone higher dosage to 30mg q8hr  -Ativan PRN, fentanyl PRN  -CT head unremarkable   -Restart Precedex drip with clonidine .2mg q8h to taper off drip  -Low concern for malignant hypothermia, NMS, or serotonin syndrome given waxing/waning and lack of inciting medications    Cardiovascular  # Hypotension   Off pressors  C/w midodrine to 20mg tid    # TACHYCARDIA: recurrent episodes   -HR can go to 130-150 while agitated - not likely cardiac source  -Sedation as above  -Continue monitoring      Pulm  #Acute hypoxic respiratory failure: secondary to Covid19 pneumonia  #ARDS  -Was on HFNC then got intubated 3/29  -Trach 4/22 continues on Vent   - Remdesivir was discontinued due to positive antibodies   - Finished Dexamethasone   - Covid19 PCR negative now, off isolation   - Daily SBT, difficulty tolerating 2/2 to agitation    # Bacterial Pneumonia  - Stable infiltrate on CXR ,likely developed Bacterial pneumonia   - Completed ABx Zosyn, meropenem, s/p 1 dose of Vancomycin  - MRSA negative from 3/26  - Sputum Cx growing few gram negative rods, CRE - Contact isolation  - Taper prednisone to 30 daily (5/11) for possible organizing pneumonia   - C/w bactrim empirically, TIW for PCP PPX  - Secretions from the trach, needs frequent suctions   - Pulm. Dr. Ryan  - ID Dr Anand     #Pneumothorax and pneumomediastinum:   -Thoracic surgery following  -s/p Chest tube placed 4/8 pigtail to wall suction and d/c on 4/15  -On 4/18 chest tube replaced for recurrence of PTX- c/w chest tube to suction     -anterior chest tube placed 5/6 for worsening pneumothorax with resolution of PTX  5/10 CXR with recurrence of apical pneumo - tube adjusted with improvement however still persisting but stable  -Fu repeat CT chest today    ID  Likely bacterial pneumonia   -leukocytosis stable 10k-12k  -Febrile o/n   -Tylenol PRN   -Completed ABX course  -plan as above     Fevers  Recurrent - o/n to 102.3,   UA now positive - andrea changed, CXR w/o new infiltrate  Started on Vanc/Zosyn empirically o/n - will d/c for now as no clear infectious source   Can be 2/2 to withdrawal or ileus      Nephro  -Pitting edema to both arms, ecchymosis on right AC, blisters on left arm around brachial area    -Was retaining urine, andrea was placed 5/5  -monitor I/Os   -Completed albumin  Metabolic alkalosis stable  Completed diamox   Hold Lasix given Ileus  Restart Free water    GI  Transaminitis:   likely secondary to Covid19, Resolved   hepatitis panel -ve  -continue to monitor LFT    Ileus  Abd xray with ileus  Keep NPO now, TF on hold  C/w Miralax BID standing - added dulcolax suppository and reglan   G tube 4/23, - dressing changed daily for seroma  Fu CT abd/pelvis w/ oral contrast  NGT pending results  Surgery following    Heme  Elevated d-dimer: likely secondary to Covid19   -D-dimer 423 on admission  -Last D-dimer 1434  - No active bleeding, restarted lovenox daily    Anemia  S/p 4 PRBC total  - Now Hg stable 7-9  CTH and CT abdomen w/o contrast no evidence of bleed    No active signs of bleeding (No hematemesis, melena or hematuria)  Hemolysis w/u- negative  Iron studies show AECD  Dr Andrade on board   F/u CBC daily     Endocrine  Prediabetes:  -A1c 5.8  -BS controlled  -continue HSS    Abnormal TSH:  -TSH level noted 0.26,   -Repeat TSH 0.37 and Free T4 1.82    Skin/Catheters  No rashes. Peripheral IV lines.   LIJ  Both arms with edema, right AC with ecchymosis    Prophylaxis   On Lovenox for DVT   Protonix for GI proph    GOC  FULL CODE

## 2021-05-13 NOTE — CONSULT NOTE ADULT - SUBJECTIVE AND OBJECTIVE BOX
Source of information: , Chart review, patient  Patient language: English  : n/a    CC: Patient is a 55y old  Male who presents with a chief complaint of SOB (13 May 2021 11:27)      HPI:  55M, no PMH, PSH appy and moody  presented 3/ with x9 days worsening cough, subjective fevers, and SOB, with x2-3 days dysuria and central, non-radiating, constant CP. Additionally endorses HA, LH, and myaglias, and denies abdominal pain and n/v/d. Patient is a nonsmoker, lives alone, and denies sick contacts. In ED, T 99.1, HR 76, /65, RR 18, and SPO2 98%. Given discomfort of breathing, placed on 4L NC, with improvement. Labs notable for lymphopenia and neutrophilia despite WBC wnl, d-dimer 423, AST//114, lactate 3.3, and . Trop negative x1. +COVID. CXR notable for b/l infiltrates, L>R. Given x1 dose Tylenol and 1L IVF bolus in ED. Admitted to [facility] for management of COVID PNA.  (14 Mar 2021 16:07)      Patient stated goal for pain control: to be able to take deep breaths, utilize incentive spirometer, get out of bed to chair and ambulate with tolerable pain control.     PAIN SCORE:       SCALE USED: (1-10 VNRS)    PAST MEDICAL & SURGICAL HISTORY:  No pertinent past medical history    S/P moody      S/P appendectomy          FAMILY HISTORY:        SOCIAL HISTORY:  [ ] Denies Smoking, Alcohol, or Drug Use    Allergies    No Known Allergies    Intolerances        MEDICATIONS:    MEDICATIONS  (STANDING):  ascorbic acid 1000 milliGRAM(s) Oral daily  BACItracin   Ointment 1 Application(s) Topical two times a day  bisacodyl Suppository 10 milliGRAM(s) Rectal daily  chlorhexidine 0.12% Liquid 15 milliLiter(s) Oral Mucosa every 12 hours  chlorhexidine 2% Cloths 1 Application(s) Topical <User Schedule>  clonazePAM  Tablet 3 milliGRAM(s) Oral every 8 hours  cloNIDine 0.2 milliGRAM(s) Oral every 8 hours  dexMEDEtomidine Infusion 1.5 MICROgram(s)/kG/Hr (31.5 mL/Hr) IV Continuous <Continuous>  doxazosin 1 milliGRAM(s) Oral at bedtime  enoxaparin Injectable 40 milliGRAM(s) SubCutaneous every 24 hours  insulin lispro (ADMELOG) corrective regimen sliding scale   SubCutaneous every 6 hours  methadone    Tablet 30 milliGRAM(s) Oral every 8 hours  metoclopramide   Syrup 10 milliGRAM(s) Oral every 6 hours  pantoprazole   Suspension 40 milliGRAM(s) Oral daily  polyethylene glycol 3350 17 Gram(s) Oral two times a day  predniSONE   Tablet 30 milliGRAM(s) Oral daily  QUEtiapine 75 milliGRAM(s) Oral two times a day  trimethoprim  160 mG/sulfamethoxazole 800 mG 1 Tablet(s) Oral <User Schedule>    MEDICATIONS  (PRN):  acetaminophen    Suspension .. 650 milliGRAM(s) Oral every 12 hours PRN Temp greater or equal to 38C (100.4F)  ALBUTerol    90 MICROgram(s) HFA Inhaler 2 Puff(s) Inhalation every 6 hours PRN Shortness of Breath and/or Wheezing  artificial  tears Solution 1 Drop(s) Both EYES every 4 hours PRN Dry Eyes  fentaNYL    Injectable 50 MICROGram(s) IV Push every 6 hours PRN Severe Pain (7 - 10)  LORazepam   Injectable 2 milliGRAM(s) IV Push every 6 hours PRN Agitation  sodium chloride 0.9% lock flush 10 milliLiter(s) IV Push every 1 hour PRN Pre/post blood products, medications, blood draw, and to maintain line patency      Vital Signs Last 24 Hrs  T(C): 37.8 (13 May 2021 12:00), Max: 39.1 (12 May 2021 19:00)  T(F): 100 (13 May 2021 12:00), Max: 102.3 (12 May 2021 19:00)  HR: 105 (13 May 2021 17:00) (96 - 174)  BP: 101/56 (13 May 2021 17:00) (98/62 - 171/76)  BP(mean): 64 (13 May 2021 17:00) (64 - 103)  RR: 15 (13 May 2021 17:00) (14 - 41)  SpO2: 96% (13 May 2021 17:00) (95% - 100%)    LABS:                          8.9    10.90 )-----------( 316      ( 13 May 2021 04:50 )             25.8     05-13    146<H>  |  109<H>  |  36<H>  ----------------------------<  109<H>  3.3<L>   |  35<H>  |  0.61    Ca    8.4      13 May 2021 04:50  Phos  4.3       Mg     2.0         TPro  6.8  /  Alb  3.0<L>  /  TBili  1.2  /  DBili  x   /  AST  37  /  ALT  59  /  AlkPhos  79        LIVER FUNCTIONS - ( 13 May 2021 04:50 )  Alb: 3.0 g/dL / Pro: 6.8 g/dL / ALK PHOS: 79 U/L / ALT: 59 U/L DA / AST: 37 U/L / GGT: x           Urinalysis Basic - ( 12 May 2021 22:10 )    Color: Brown / Appearance: very cloudy / S.020 / pH: x  Gluc: x / Ketone: Trace  / Bili: Negative / Urobili: 4   Blood: x / Protein: 500 mg/dL / Nitrite: Negative   Leuk Esterase: Trace / RBC: 10-25 /HPF / WBC >50 /HPF   Sq Epi: x / Non Sq Epi: Few /HPF / Bacteria: Moderate /HPF      CAPILLARY BLOOD GLUCOSE      POCT Blood Glucose.: 129 mg/dL (13 May 2021 11:08)  POCT Blood Glucose.: 110 mg/dL (13 May 2021 05:32)  POCT Blood Glucose.: 131 mg/dL (13 May 2021 00:20)      REVIEW OF SYSTEMS:  CONSTITUTIONAL: No fever or fatigue  RESPIRATORY: No cough, wheezing, chills or hemoptysis; No shortness of breath  CARDIOVASCULAR: No chest pain, palpitations, dizziness, or leg swelling  GASTROINTESTINAL: No abdominal or epigastric pain. No nausea, vomiting; No diarrhea or constipation.   GENITOURINARY: No dysuria, frequency, hematuria, retention or incontinence  MUSCULOSKELETAL: No joint pain or swelling; No muscle, back, or extremity pain, no upper or lower motor strength weakness, no saddle anesthesia, bowel/bladder incontinence, no falls   NEURO: No weakness, no numbness   PSYCHIATRIC: No depression, anxiety, mood swings, or difficulty sleeping    PHYSICAL EXAM:  GENERAL:  Alert & Oriented X3, NAD, Good concentration  CHEST/LUNG: Clear to auscultation bilaterally; No rales, rhonchi, wheezing, or rubs  HEART: Regular rate and rhythm; No murmurs, rubs, or gallops  ABDOMEN: Soft, Nontender, Nondistended; Bowel sounds present  : no incontinence, no flank pain  EXTREMITIES:  2+ Peripheral Pulses, No cyanosis, or edema  MUSCULOSKELETAL: Motor Strength 5/5 B/L upper and lower extremities; moves all extremities equally against gravity; ROM intact; negative SRL  NEUROLOGICAL: awake and alert and oriented   SKIN: No rashes or lesions    Radiology:    Drug Screen:  enoxaparin Injectable 40 milliGRAM(s) SubCutaneous every 24 hours    trimethoprim  160 mG/sulfamethoxazole 800 mG 1 Tablet(s) Oral <User Schedule>        ORT Score   Family Hx of substance abuse	Female	Male  Alcohol 	                                              1              3  Illegal drugs	                                      2              3  Rx drugs                                               4	      4    Personal Hx of substance abuse		  Alcohol 	                                               3	      3  Illegal drugs                                  	       4	      4  Rx drugs                                                5	      5  Age between 16- 45 years	               1             1  hx preadolescent sexual abuse	       3	      0  Psychological disease		  ADD, OCD, bipolar, schizophrenia	2	      2  Depression                                    	1	      1  Score totals 		  		  a score of 3 or lower indicates low risk for opioid abuse		  a score of 4-7 indicates moderate risk for opioid abuse		  a score of 8 or higher indicates high risk for opioid abuse	    Risk factors associated with adverse outcomes related to opioid treatment  [ ]  Concurrent benzodiazepine use  [ ]  History/ Active substance use or alcohol use disorder  [ ] Psychiatric co-morbidity  [ ] Sleep apnea  [ ] COPD  [ ] BMI> 35  [ ] Liver dysfunction  [ ] Renal dysfunction  [ ] CHF  [ ] Smoker  [ ]  Age > 60 years      [ ]  [place]  Reviewed and Copied to Chart. See below.           Source of information: , Chart review, patient  Patient language: English  : n/a    CC: Patient is a 55y old  Male who presents with a chief complaint of SOB (13 May 2021 11:27)      HPI:  55M, no PMH, PSH appy and moody  presented 3/14 with x9 days worsening cough, subjective fevers, and SOB, with x2-3 days dysuria and central, non-radiating, constant CP. Additionally endorses HA, LH, and myaglias, and denies abdominal pain and n/v/d. Patient is a nonsmoker, lives alone, and denies sick contacts. In ED, T 99.1, HR 76, /65, RR 18, and SPO2 98%. Given discomfort of breathing, placed on 4L NC, with improvement. Labs notable for lymphopenia and neutrophilia despite WBC wnl, d-dimer 423, AST//114, lactate 3.3, and . Trop negative x1. +COVID. CXR notable for b/l infiltrates, L>R. Given x1 dose Tylenol and 1L IVF bolus in ED. Admitted to [facility] for management of COVID PNA.  (14 Mar 2021 16:07)      Pt admitted on 3/12/2021.  Pt was covid +.  Subsequently intubated.  Pt was sedated, paralyzed.  Pt intubated on 3/29/2021.  Pt was on propofol and fentanyl for >3 weeks.  Pt now trached, with peg.  Pt is agitated, tachycardia, + diarrhea.  Pt started on methadone 2021 and slowly titrated up.  started at 5mg po q 2x a day, then several days later to q8 on .  Dose increased to 30mg po q 8 hours on 2021 because pt was exhibiting signs of withdrawal - diarrhea tachycardia, tachypnea agitation.  Pt was also on a fentanyl patch prior to starting methadone which is now dc.  Pt was also on ativan atc in april.    Pt now on precedex gttp.  Pt started on clonidine 0.3mg po q 8 hours and klonopin 3mg po q 8 hours.  + history of pneumo - chest tubes in place.      PAST MEDICAL & SURGICAL HISTORY:  No pertinent past medical history    S/P moody      S/P appendectomy          FAMILY HISTORY:        SOCIAL HISTORY:  unable to obtain  Allergies    No Known Allergies    Intolerances        MEDICATIONS:    MEDICATIONS  (STANDING):  ascorbic acid 1000 milliGRAM(s) Oral daily  BACItracin   Ointment 1 Application(s) Topical two times a day  bisacodyl Suppository 10 milliGRAM(s) Rectal daily  chlorhexidine 0.12% Liquid 15 milliLiter(s) Oral Mucosa every 12 hours  chlorhexidine 2% Cloths 1 Application(s) Topical <User Schedule>  clonazePAM  Tablet 3 milliGRAM(s) Oral every 8 hours  cloNIDine 0.2 milliGRAM(s) Oral every 8 hours  dexMEDEtomidine Infusion 1.5 MICROgram(s)/kG/Hr (31.5 mL/Hr) IV Continuous <Continuous>  doxazosin 1 milliGRAM(s) Oral at bedtime  enoxaparin Injectable 40 milliGRAM(s) SubCutaneous every 24 hours  insulin lispro (ADMELOG) corrective regimen sliding scale   SubCutaneous every 6 hours  methadone    Tablet 30 milliGRAM(s) Oral every 8 hours  metoclopramide   Syrup 10 milliGRAM(s) Oral every 6 hours  pantoprazole   Suspension 40 milliGRAM(s) Oral daily  polyethylene glycol 3350 17 Gram(s) Oral two times a day  predniSONE   Tablet 30 milliGRAM(s) Oral daily  QUEtiapine 75 milliGRAM(s) Oral two times a day  trimethoprim  160 mG/sulfamethoxazole 800 mG 1 Tablet(s) Oral <User Schedule>    MEDICATIONS  (PRN):  acetaminophen    Suspension .. 650 milliGRAM(s) Oral every 12 hours PRN Temp greater or equal to 38C (100.4F)  ALBUTerol    90 MICROgram(s) HFA Inhaler 2 Puff(s) Inhalation every 6 hours PRN Shortness of Breath and/or Wheezing  artificial  tears Solution 1 Drop(s) Both EYES every 4 hours PRN Dry Eyes  fentaNYL    Injectable 50 MICROGram(s) IV Push every 6 hours PRN Severe Pain (7 - 10)  LORazepam   Injectable 2 milliGRAM(s) IV Push every 6 hours PRN Agitation  sodium chloride 0.9% lock flush 10 milliLiter(s) IV Push every 1 hour PRN Pre/post blood products, medications, blood draw, and to maintain line patency      Vital Signs Last 24 Hrs  T(C): 37.8 (13 May 2021 12:00), Max: 39.1 (12 May 2021 19:00)  T(F): 100 (13 May 2021 12:00), Max: 102.3 (12 May 2021 19:00)  HR: 105 (13 May 2021 17:00) (96 - 174)  BP: 101/56 (13 May 2021 17:00) (98/62 - 171/76)  BP(mean): 64 (13 May 2021 17:00) (64 - 103)  RR: 15 (13 May 2021 17:00) (14 - 41)  SpO2: 96% (13 May 2021 17:00) (95% - 100%)    LABS:                          8.9    10.90 )-----------( 316      ( 13 May 2021 04:50 )             25.8     05-13    146<H>  |  109<H>  |  36<H>  ----------------------------<  109<H>  3.3<L>   |  35<H>  |  0.61    Ca    8.4      13 May 2021 04:50  Phos  4.3     -  Mg     2.0     -    TPro  6.8  /  Alb  3.0<L>  /  TBili  1.2  /  DBili  x   /  AST  37  /  ALT  59  /  AlkPhos  79  05-13      LIVER FUNCTIONS - ( 13 May 2021 04:50 )  Alb: 3.0 g/dL / Pro: 6.8 g/dL / ALK PHOS: 79 U/L / ALT: 59 U/L DA / AST: 37 U/L / GGT: x           Urinalysis Basic - ( 12 May 2021 22:10 )    Color: Brown / Appearance: very cloudy / S.020 / pH: x  Gluc: x / Ketone: Trace  / Bili: Negative / Urobili: 4   Blood: x / Protein: 500 mg/dL / Nitrite: Negative   Leuk Esterase: Trace / RBC: 10-25 /HPF / WBC >50 /HPF   Sq Epi: x / Non Sq Epi: Few /HPF / Bacteria: Moderate /HPF      CAPILLARY BLOOD GLUCOSE      POCT Blood Glucose.: 129 mg/dL (13 May 2021 11:08)  POCT Blood Glucose.: 110 mg/dL (13 May 2021 05:32)  POCT Blood Glucose.: 131 mg/dL (13 May 2021 00:20)      REVIEW OF SYSTEMS:  - unable to obtain due to mentation     PHYSICAL EXAM:  GENERAL:  lethargic, sedated   CHEST/LUNG: decreased breath sounds   HEART: Regular rate and rhythm; No murmurs, rubs, or gallops + tachycardic + trach  ABDOMEN: Soft, Nontender, Nondistended; Bowel sounds present + peg   : no incontinence, no flank pain  EXTREMITIES:  2+ Peripheral Pulses, No cyanosis, or edema  MUSCULOSKELETAL: + decreased rom   NEUROLOGICAL: sedated       Radiology:  < from: CT Abdomen and Pelvis w/ Oral Cont (21 @ 16:02) >    PROCEDURE DATE:  2021          INTERPRETATION:  CLINICAL INFORMATION: COVID positive patient with hypoxia and vomiting.    COMPARISON: 2021    CONTRAST/COMPLICATIONS:  IV Contrast: IV contrast documented in associated exam (accession [id number]), NONE (accession [id number]) no IV contrast administered.  Oral Contrast: Positive contrast administered.  Complications: None reported at time ofstudy completion    PROCEDURE:  CT of the Chest, Abdomen and Pelvis was performed.  Sagittal and coronal reformats were performed.    FINDINGS:  CHEST:  LUNGS AND LARGE AIRWAYS: A tracheostomy tube is noted. The tip is located superior to the lev.Diffuse interstitial infiltrates throughout the right lung. Similar diffuse infiltrates throughout the left lung. Consolidation and atelectasis in the left lower lobe.  PLEURA: No evidence of pleural effusion. There is a small left pneumothorax. A left pigtail chest tube enters via the anterior chest wall within the pleural space. A second chest tube is noted entering laterally at the left lung base.  VESSELS: Left IJ central line with the tip in the left innominate vein. Vessels otherwise demonstrate a normal noncontrast appearance.  HEART: Heart size is normal. No pericardial effusion.  MEDIASTINUM AND JENNIFER: No lymphadenopathy.  CHEST WALL AND LOWER NECK: Within normal limits.    ABDOMEN AND PELVIS:  LIVER: Within normal limits.  BILE DUCTS: Normal caliber.  GALLBLADDER: Cholecystectomy.  SPLEEN: Within normal limits.  PANCREAS: Within normal limits.  ADRENALS: Within normal limits.  KIDNEYS/URETERS: The kidneys reveal a normal unenhanced morphology without evidence of stone or hydronephrosis.    BLADDER: Cerda catheter.  REPRODUCTIVE ORGANS: Prostate gland is not enlarged.    BOWEL: Diffuse dilatation of the colon from the cecum around to the anus. Multiple air-fluid levels noted in the colon. There is also diffuse dilatation of small bowel loops also with air-fluid levels. The stomach is not distended. Several proximal small bowel loops are not distended. A gastrostomy tube is noted in place within the stomach. Oral contrast that was administered into the stomach has progressed through the small bowel into the cecum. Findings felt to be most consistent with ileus.  PERITONEUM: No ascites.  VESSELS: Atherosclerotic changes.  RETROPERITONEUM/LYMPH NODES: No lymphadenopathy.  ABDOMINAL WALL: Postsurgical changes.  BONES: Degenerative changes.    IMPRESSION:  CT scan of the chest demonstrates diffuse bilateral infiltrates with a region of consolidation at the left lung base. Small left pneumothorax. 2 left chest tubes noted in place.    CT scan of the abdomen and pelvis demonstrates diffuse dilatation of small and large bowel loops most consistent with ileus.    < end of copied text >      Drug Screen:  enoxaparin Injectable 40 milliGRAM(s) SubCutaneous every 24 hours    trimethoprim  160 mG/sulfamethoxazole 800 mG 1 Tablet(s) Oral <User Schedule>

## 2021-05-13 NOTE — PROGRESS NOTE ADULT - ASSESSMENT
54 yo M s/p left anterior pigtail chest tube 5/6 and left pigtail chest tube 4/18. S/p Tracheostomy 4/21, s/p G-tube placement 4/23 with seroma, as per nurse feculent material from mouth and trach site overnight.   -Daily dressing changes   -F/u CT scan   -NGT   -Continue care as per ICU team   -Discussed with Dr. Lerma who agrees

## 2021-05-13 NOTE — PROGRESS NOTE ADULT - SUBJECTIVE AND OBJECTIVE BOX
Patient is a 55y old  Male who presents with a chief complaint of SOB (12 May 2021 11:42)    pt seen in icu [ x ], reg med floor [   ], bed [ x ], chair at bedside [   ], awake and responsive [ x ], mildly sedated, [  ],    nad [x  ]        Allergies    No Known Allergies        Vitals    T(F): 99.1 (05-13-21 @ 04:56), Max: 102.3 (05-12-21 @ 19:00)  HR: 140 (05-13-21 @ 06:00) (72 - 174)  BP: 147/78 (05-13-21 @ 06:00) (97/54 - 171/76)  RR: 36 (05-13-21 @ 06:00) (19 - 48)  SpO2: 100% (05-13-21 @ 06:00) (96% - 100%)  Wt(kg): --  CAPILLARY BLOOD GLUCOSE      POCT Blood Glucose.: 110 mg/dL (13 May 2021 05:32)      Labs                          8.9    10.90 )-----------( 316      ( 13 May 2021 04:50 )             25.8       05-13    146<H>  |  109<H>  |  36<H>  ----------------------------<  109<H>  3.3<L>   |  35<H>  |  0.61    Ca    8.4      13 May 2021 04:50  Phos  4.3     05-13  Mg     2.0     05-13    TPro  6.8  /  Alb  3.0<L>  /  TBili  1.2  /  DBili  x   /  AST  37  /  ALT  59  /  AlkPhos  79  05-13      CARDIAC MARKERS ( 12 May 2021 22:49 )  x     / x     / 21 U/L / x     / x            .Sputum Sputum  04-16 @ 04:42   Moderate Enterobacter aerogenes (Carbapenem Resistant)  Normal Respiratory Monserrat present  --  Enterobacter aerogenes (Carbapenem Resistant)      .Urine Clean Catch (Midstream)  03-15 @ 00:52   No growth  --  --          Radiology Results      Meds    MEDICATIONS  (STANDING):  ascorbic acid 1000 milliGRAM(s) Oral daily  BACItracin   Ointment 1 Application(s) Topical two times a day  bisacodyl Suppository 10 milliGRAM(s) Rectal daily  chlorhexidine 0.12% Liquid 15 milliLiter(s) Oral Mucosa every 12 hours  chlorhexidine 2% Cloths 1 Application(s) Topical <User Schedule>  clonazePAM  Tablet 3 milliGRAM(s) Oral every 8 hours  doxazosin 1 milliGRAM(s) Oral at bedtime  enoxaparin Injectable 40 milliGRAM(s) SubCutaneous every 24 hours  furosemide   Injectable 40 milliGRAM(s) IV Push daily  insulin lispro (ADMELOG) corrective regimen sliding scale   SubCutaneous every 6 hours  methadone    Tablet 30 milliGRAM(s) Oral every 8 hours  metoclopramide   Syrup 10 milliGRAM(s) Oral every 6 hours  midodrine 20 milliGRAM(s) Oral every 8 hours  pantoprazole   Suspension 40 milliGRAM(s) Oral daily  piperacillin/tazobactam IVPB.. 3.375 Gram(s) IV Intermittent every 8 hours  polyethylene glycol 3350 17 Gram(s) Oral two times a day  predniSONE   Tablet 30 milliGRAM(s) Oral daily  QUEtiapine 75 milliGRAM(s) Oral two times a day  trimethoprim  160 mG/sulfamethoxazole 800 mG 1 Tablet(s) Oral <User Schedule>  vancomycin  IVPB 1250 milliGRAM(s) IV Intermittent every 12 hours  vancomycin  IVPB          MEDICATIONS  (PRN):  acetaminophen    Suspension .. 650 milliGRAM(s) Oral every 12 hours PRN Temp greater or equal to 38C (100.4F)  ALBUTerol    90 MICROgram(s) HFA Inhaler 2 Puff(s) Inhalation every 6 hours PRN Shortness of Breath and/or Wheezing  artificial  tears Solution 1 Drop(s) Both EYES every 4 hours PRN Dry Eyes  fentaNYL    Injectable 50 MICROGram(s) IV Push every 6 hours PRN Severe Pain (7 - 10)  LORazepam   Injectable 2 milliGRAM(s) IV Push every 6 hours PRN Agitation  sodium chloride 0.9% lock flush 10 milliLiter(s) IV Push every 1 hour PRN Pre/post blood products, medications, blood draw, and to maintain line patency      Physical Exam    Neuro :  no focal deficits  Respiratory: CTA B/L  CV: RRR, S1S2, no murmurs,   Abdominal: Soft, NT, ND +BS, g- tube in place, dressing cdi  Extremities: b/l ue edema, + peripheral pulses      ASSESSMENT    Hypoxemia 2nd to covid pna   transaminitis  prediabetes  h/o appendectomy  cholecystectomy        PLAN    contact and airborne isolation  d/c remdesevir given covid ab positive noted   completed dexamethasone   started pulse steroids for 3 days - 250mg solumedrol bid now tapered off 4/14/21   C/w prednisone 40 daily for possible organizing pneumonia   Started on bactrim empirically, TIW  cont asa, vit c,    cont albuterol inhaler   pulm f/u  procalcitonin, D-dimer, crp, ldh, ferritin, lactate noted ,    tmx 100.7  cont tylenol prn,   cont robitussin prn   pt on precedex drip    pt on fentanyl patch  pt on phenylephrine drip   cont midodrine    s/p intubation 3/29/21   s/p tracheostomy 4/21/21  O2 sat 100% (93% - 100%) mv 40%  O2 via mech vent and taper fio2 as tolerated   vent mgmt as per icu  xray 3/19/21 with pneumomediastinum  rept cxr with New trace right apical pneumothorax. New mild left apical pneumothorax. Grossly stable small pneumomediastinum.  Soft tissue emphysema at the neck bases bilaterally. Grossly stable bilateral pulmonary infiltrates noted.   cxr 2/24 with No evidence of pneumothorax can be appreciated on the available image. This may be related to patient positioning. Evidence of pneumomediastinum and subcutaneous emphysema in the lower neck is again noted. There are patchy bibasilar infiltrates and elevated right hemidiaphragm noted.   cxr 3/29/21 with No significant change bilateral infiltrates. There is a small simple left apical pneumothorax. No significant pleural effusion. Bilateral subcutaneous emphysema similar to prior.   XRs on 7AM and 5PM with no obvious increase of ptx  cxr 4/4/21 with Improving bilateral airspace disease noted    cxr 4/8/21 with Small left pneumothorax noted.  thoracic surg f/u   s/p L chest tube replacement 4/18/21 for recurrence of left pneumothorax   cxr 4/20/21 with Unchanged advanced infiltrates and catheter left chest tube noted   CXR 4/11/21 with : No interval change compared to one day prior. No pneumothorax noted.   unable to flush or aspirate tube fully, noted debris in tubing which was milked out.   Once material removed from tubing able to aspirated and flush fully. Also changed dressing on pigtail which appears to be in good position.   Monitor O2 status   s/p tracheostomy 4/21/21   stay sutures out 2 weeks from OR date  cxr 4/21/21 with No evidence of active chest disease. Tracheostomy tube in place otherwise no significant change noted   cxr 4/25/21 with A 40% LEFT pneumothorax. LEFT multi-sidehole pigtail catheter overlies LEFT lower hemithorax.. Bilateral multifocal and diffuse ill-defined airspace opacities..  Follow-up AP portable chest radiograph 4/25/2021 AT 8:58 AM: Residual LEFT 30% upper zone pneumothorax. Otherwise no interval change.noted    cxr 4/30/21 Tracheostomy tube again noted. Left chest tube noted with small left apical pneumothorax unchanged. Bilateral airspace opacities overall worsening. Heart size cannot be accurately assessed in this projection noted.   cxr 5/3/21 with Large left pneumothorax noted above.   cxr 5 4/21 with No significant interval change noted above.   ct chest with Extensive chronic appearing consolidative opacities throughout the lungs, most prominent in the left lower lobe and lingula, with bronchiectasis, scarring, and volume loss. Additional scattered peripheral coarse opacities.   Mild left pneumothorax. Left lateral pleural space pigtail catheter, not in continuity with the pneumothorax. Mild bilateral hydronephrosis, similar to appearance on CT of the abdomen and pelvis on April 27. noted above   s/p 2nd chest tube 5/5/21  cxr 5/6/21 with Tracheostomy and catheter left chest tubes remain. , There are significant diffuse advanced infiltrates again noted. No pneumothorax. Above findings are similar to study earlier in the day. Present film shows a left jugular line inserted   with tip entering the superior vena cava noted   cxr 5/11/21 with Heart magnified by technique. Tracheostomy, left jugular line, and 2 catheter left chest tubes remain. Quite advanced infiltration in the lungs particularly in the left lower lobe again noted.  There is a persistent rather small left apical pneumothorax. Chest is similar to May 10 noted above.  s/p surgical placement of gastrostomy tube 4/23/2021.   abd xray 5/10/21 with ileus noted  cont on tube feeding  id f/u   No need for further antibiotics as patient completed course of Meropenem  leukocytosis resolved  speutum cx with Enterobacter aerogenes (Carbapenem Resistant) noted above  andrea   lispro ss   prognosis poor   s/p 4 units prbc for anemia  f/u h/h    transfuse prbc as needed  check vitamin b12  heme onc f/u  retic is elevated.    Haptoglobin normal  ? daily phlebotomy  no hemolysis, Fe/B12/folate adequate  hemolysis is unlikely even his baseline haptoglobin may be very elevated due to COVID  direct pamella neg noted    COVID is known to cause cold agglutinin  cont current meds  mgmt as per icu        Patient is a 55y old  Male who presents with a chief complaint of SOB (12 May 2021 11:42)    pt seen in icu [ x ], reg med floor [   ], bed [ x ], chair at bedside [   ], awake and responsive [ x ], mildly sedated, [  ],    nad [x  ]        Allergies    No Known Allergies        Vitals    T(F): 99.1 (05-13-21 @ 04:56), Max: 102.3 (05-12-21 @ 19:00)  HR: 140 (05-13-21 @ 06:00) (72 - 174)  BP: 147/78 (05-13-21 @ 06:00) (97/54 - 171/76)  RR: 36 (05-13-21 @ 06:00) (19 - 48)  SpO2: 100% (05-13-21 @ 06:00) (96% - 100%)  Wt(kg): --  CAPILLARY BLOOD GLUCOSE      POCT Blood Glucose.: 110 mg/dL (13 May 2021 05:32)      Labs                          8.9    10.90 )-----------( 316      ( 13 May 2021 04:50 )             25.8       05-13    146<H>  |  109<H>  |  36<H>  ----------------------------<  109<H>  3.3<L>   |  35<H>  |  0.61    Ca    8.4      13 May 2021 04:50  Phos  4.3     05-13  Mg     2.0     05-13    TPro  6.8  /  Alb  3.0<L>  /  TBili  1.2  /  DBili  x   /  AST  37  /  ALT  59  /  AlkPhos  79  05-13      CARDIAC MARKERS ( 12 May 2021 22:49 )  x     / x     / 21 U/L / x     / x            .Sputum Sputum  04-16 @ 04:42   Moderate Enterobacter aerogenes (Carbapenem Resistant)  Normal Respiratory Monserrat present  --  Enterobacter aerogenes (Carbapenem Resistant)      .Urine Clean Catch (Midstream)  03-15 @ 00:52   No growth  --  --          Radiology Results      Meds    MEDICATIONS  (STANDING):  ascorbic acid 1000 milliGRAM(s) Oral daily  BACItracin   Ointment 1 Application(s) Topical two times a day  bisacodyl Suppository 10 milliGRAM(s) Rectal daily  chlorhexidine 0.12% Liquid 15 milliLiter(s) Oral Mucosa every 12 hours  chlorhexidine 2% Cloths 1 Application(s) Topical <User Schedule>  clonazePAM  Tablet 3 milliGRAM(s) Oral every 8 hours  doxazosin 1 milliGRAM(s) Oral at bedtime  enoxaparin Injectable 40 milliGRAM(s) SubCutaneous every 24 hours  furosemide   Injectable 40 milliGRAM(s) IV Push daily  insulin lispro (ADMELOG) corrective regimen sliding scale   SubCutaneous every 6 hours  methadone    Tablet 30 milliGRAM(s) Oral every 8 hours  metoclopramide   Syrup 10 milliGRAM(s) Oral every 6 hours  midodrine 20 milliGRAM(s) Oral every 8 hours  pantoprazole   Suspension 40 milliGRAM(s) Oral daily  piperacillin/tazobactam IVPB.. 3.375 Gram(s) IV Intermittent every 8 hours  polyethylene glycol 3350 17 Gram(s) Oral two times a day  predniSONE   Tablet 30 milliGRAM(s) Oral daily  QUEtiapine 75 milliGRAM(s) Oral two times a day  trimethoprim  160 mG/sulfamethoxazole 800 mG 1 Tablet(s) Oral <User Schedule>  vancomycin  IVPB 1250 milliGRAM(s) IV Intermittent every 12 hours  vancomycin  IVPB          MEDICATIONS  (PRN):  acetaminophen    Suspension .. 650 milliGRAM(s) Oral every 12 hours PRN Temp greater or equal to 38C (100.4F)  ALBUTerol    90 MICROgram(s) HFA Inhaler 2 Puff(s) Inhalation every 6 hours PRN Shortness of Breath and/or Wheezing  artificial  tears Solution 1 Drop(s) Both EYES every 4 hours PRN Dry Eyes  fentaNYL    Injectable 50 MICROGram(s) IV Push every 6 hours PRN Severe Pain (7 - 10)  LORazepam   Injectable 2 milliGRAM(s) IV Push every 6 hours PRN Agitation  sodium chloride 0.9% lock flush 10 milliLiter(s) IV Push every 1 hour PRN Pre/post blood products, medications, blood draw, and to maintain line patency      Physical Exam    Neuro :  no focal deficits  Respiratory: CTA B/L  CV: RRR, S1S2, no murmurs,   Abdominal: Soft, NT, ND +BS, g- tube in place, dressing cdi  Extremities: b/l ue edema, + peripheral pulses      ASSESSMENT    Hypoxemia 2nd to covid pna   transaminitis  prediabetes  h/o appendectomy  cholecystectomy        PLAN    contact and airborne isolation  d/c remdesevir given covid ab positive noted   completed dexamethasone   started pulse steroids for 3 days - 250mg solumedrol bid now tapered off 4/14/21   C/w prednisone 40 daily for possible organizing pneumonia   cont on bactrim empirically, TIW   cont asa, vit c,    cont albuterol inhaler   pulm f/u  procalcitonin, D-dimer, crp, ldh, ferritin, lactate noted ,    cont tylenol prn,   cont robitussin prn   pt on precedex drip    pt on fentanyl patch  pt on phenylephrine drip   cont midodrine    s/p intubation 3/29/21   s/p tracheostomy 4/21/21  O2 sat 100% (93% - 100%) mv 40%  O2 via mech vent and taper fio2 as tolerated   vent mgmt as per icu  xray 3/19/21 with pneumomediastinum  rept cxr with New trace right apical pneumothorax. New mild left apical pneumothorax. Grossly stable small pneumomediastinum.  Soft tissue emphysema at the neck bases bilaterally. Grossly stable bilateral pulmonary infiltrates noted.   cxr 2/24 with No evidence of pneumothorax can be appreciated on the available image. This may be related to patient positioning. Evidence of pneumomediastinum and subcutaneous emphysema in the lower neck is again noted. There are patchy bibasilar infiltrates and elevated right hemidiaphragm noted.   cxr 3/29/21 with No significant change bilateral infiltrates. There is a small simple left apical pneumothorax. No significant pleural effusion. Bilateral subcutaneous emphysema similar to prior.   XRs on 7AM and 5PM with no obvious increase of ptx  cxr 4/4/21 with Improving bilateral airspace disease noted    cxr 4/8/21 with Small left pneumothorax noted.  thoracic surg f/u   s/p L chest tube replacement 4/18/21 for recurrence of left pneumothorax   cxr 4/20/21 with Unchanged advanced infiltrates and catheter left chest tube noted   CXR 4/11/21 with : No interval change compared to one day prior. No pneumothorax noted.   unable to flush or aspirate tube fully, noted debris in tubing which was milked out.   Once material removed from tubing able to aspirated and flush fully. Also changed dressing on pigtail which appears to be in good position.   Monitor O2 status   s/p tracheostomy 4/21/21   stay sutures out 2 weeks from OR date  cxr 4/21/21 with No evidence of active chest disease. Tracheostomy tube in place otherwise no significant change noted   cxr 4/25/21 with A 40% LEFT pneumothorax. LEFT multi-sidehole pigtail catheter overlies LEFT lower hemithorax.. Bilateral multifocal and diffuse ill-defined airspace opacities..  Follow-up AP portable chest radiograph 4/25/2021 AT 8:58 AM: Residual LEFT 30% upper zone pneumothorax. Otherwise no interval change.noted    cxr 4/30/21 Tracheostomy tube again noted. Left chest tube noted with small left apical pneumothorax unchanged. Bilateral airspace opacities overall worsening. Heart size cannot be accurately assessed in this projection noted.   cxr 5/3/21 with Large left pneumothorax noted above.   cxr 5 4/21 with No significant interval change noted above.   ct chest with Extensive chronic appearing consolidative opacities throughout the lungs, most prominent in the left lower lobe and lingula, with bronchiectasis, scarring, and volume loss. Additional scattered peripheral coarse opacities.   Mild left pneumothorax. Left lateral pleural space pigtail catheter, not in continuity with the pneumothorax. Mild bilateral hydronephrosis, similar to appearance on CT of the abdomen and pelvis on April 27. noted above   s/p 2nd chest tube 5/5/21  cxr 5/6/21 with Tracheostomy and catheter left chest tubes remain. , There are significant diffuse advanced infiltrates again noted. No pneumothorax. Above findings are similar to study earlier in the day. Present film shows a left jugular line inserted   with tip entering the superior vena cava noted   cxr 5/11/21 with Heart magnified by technique. Tracheostomy, left jugular line, and 2 catheter left chest tubes remain. Quite advanced infiltration in the lungs particularly in the left lower lobe again noted.  There is a persistent rather small left apical pneumothorax. Chest is similar to May 10 noted above.  s/p surgical placement of gastrostomy tube 4/23/2021.   abd xray 5/10/21 with ileus noted   dressing changed daily for seroma  tube feeding on hold  id f/u   No need for further antibiotics as patient completed course of Meropenem  leukocytosis resolved  speutum cx with Enterobacter aerogenes (Carbapenem Resistant) noted above   tmx 102.3   ua positive    andrea changed  Started on Vanc/Zosyn empirically   f/u blood and ucx  lispro ss   prognosis poor   s/p 4 units prbc for anemia  f/u h/h    transfuse prbc as needed  check vitamin b12  heme onc f/u  retic is elevated.    Haptoglobin normal  ? daily phlebotomy  no hemolysis, Fe/B12/folate adequate  hemolysis is unlikely even his baseline haptoglobin may be very elevated due to COVID  direct pamella neg noted    COVID is known to cause cold agglutinin  cont current meds  mgmt as per icu

## 2021-05-13 NOTE — CONSULT NOTE ADULT - PROVIDER SPECIALTY LIST ADULT
Critical Care
Infectious Disease
Surgery
Pain Medicine
Thoracic Surgery
Heme/Onc
Pulmonology
Palliative Care

## 2021-05-13 NOTE — PROGRESS NOTE ADULT - SUBJECTIVE AND OBJECTIVE BOX
INTERVAL HPI/OVERNIGHT EVENTS:  Patient seen and examined at bedside  Pt with trach, resting comfortably  As per nurse patient with feculent material from mouth and trach site     MEDICATIONS  (STANDING):  ascorbic acid 1000 milliGRAM(s) Oral daily  BACItracin   Ointment 1 Application(s) Topical two times a day  bisacodyl Suppository 10 milliGRAM(s) Rectal daily  chlorhexidine 0.12% Liquid 15 milliLiter(s) Oral Mucosa every 12 hours  chlorhexidine 2% Cloths 1 Application(s) Topical <User Schedule>  clonazePAM  Tablet 3 milliGRAM(s) Oral every 8 hours  dexMEDEtomidine Infusion 1.5 MICROgram(s)/kG/Hr (31.5 mL/Hr) IV Continuous <Continuous>  doxazosin 1 milliGRAM(s) Oral at bedtime  enoxaparin Injectable 40 milliGRAM(s) SubCutaneous every 24 hours  insulin lispro (ADMELOG) corrective regimen sliding scale   SubCutaneous every 6 hours  methadone    Tablet 30 milliGRAM(s) Oral every 8 hours  metoclopramide   Syrup 10 milliGRAM(s) Oral every 6 hours  midodrine 20 milliGRAM(s) Oral every 8 hours  pantoprazole   Suspension 40 milliGRAM(s) Oral daily  polyethylene glycol 3350 17 Gram(s) Oral two times a day  potassium chloride  10 mEq/100 mL IVPB 10 milliEquivalent(s) IV Intermittent every 1 hour  predniSONE   Tablet 30 milliGRAM(s) Oral daily  QUEtiapine 75 milliGRAM(s) Oral two times a day  trimethoprim  160 mG/sulfamethoxazole 800 mG 1 Tablet(s) Oral <User Schedule>    MEDICATIONS  (PRN):  acetaminophen    Suspension .. 650 milliGRAM(s) Oral every 12 hours PRN Temp greater or equal to 38C (100.4F)  ALBUTerol    90 MICROgram(s) HFA Inhaler 2 Puff(s) Inhalation every 6 hours PRN Shortness of Breath and/or Wheezing  artificial  tears Solution 1 Drop(s) Both EYES every 4 hours PRN Dry Eyes  fentaNYL    Injectable 50 MICROGram(s) IV Push every 6 hours PRN Severe Pain (7 - 10)  LORazepam   Injectable 2 milliGRAM(s) IV Push every 6 hours PRN Agitation  sodium chloride 0.9% lock flush 10 milliLiter(s) IV Push every 1 hour PRN Pre/post blood products, medications, blood draw, and to maintain line patency      Vital Signs Last 24 Hrs  T(C): 37.9 (13 May 2021 07:30), Max: 39.1 (12 May 2021 19:00)  T(F): 100.2 (13 May 2021 07:30), Max: 102.3 (12 May 2021 19:00)  HR: 152 (13 May 2021 10:00) (94 - 174)  BP: 150/80 (13 May 2021 10:00) (97/56 - 171/76)  BP(mean): 98 (13 May 2021 10:00) (66 - 105)  RR: 32 (13 May 2021 10:00) (19 - 48)  SpO2: 96% (13 May 2021 10:00) (95% - 100%)    Physical:  General: NAD  Respirations: Trach, ventilated,  anterior pigtail no airleak; posterior CT no airleak, to suction  Abdomen: Seroma site with packing, changed at bedside, abdomen soft, distended.     I&O's Detail    12 May 2021 07:01  -  13 May 2021 07:00  --------------------------------------------------------  IN:    Dexmedetomidine: 63 mL    IV PiggyBack: 250 mL    IV PiggyBack: 100 mL    Jevity 1.5: 120 mL    Phenylephrine: 130.9 mL  Total IN: 663.9 mL    OUT:    Chest Tube (mL): 10 mL    Chest Tube (mL): 23 mL    Indwelling Catheter - Urethral (mL): 1895 mL  Total OUT: 1928 mL    Total NET: -1264.1 mL      13 May 2021 07:01  -  13 May 2021 11:28  --------------------------------------------------------  IN:    Dexmedetomidine: 10.5 mL    Enteral Tube Flush: 320 mL    IV PiggyBack: 300 mL  Total IN: 630.5 mL    OUT:    Indwelling Catheter - Urethral (mL): 1660 mL  Total OUT: 1660 mL    Total NET: -1029.5 mL          LABS:                        8.9    10.90 )-----------( 316      ( 13 May 2021 04:50 )             25.8             05-13    146<H>  |  109<H>  |  36<H>  ----------------------------<  109<H>  3.3<L>   |  35<H>  |  0.61    Ca    8.4      13 May 2021 04:50  Phos  4.3     05-13  Mg     2.0     05-13    TPro  6.8  /  Alb  3.0<L>  /  TBili  1.2  /  DBili  x   /  AST  37  /  ALT  59  /  AlkPhos  79  05-13    Abdominal XR 5/13: pending read  CT: pending

## 2021-05-13 NOTE — CHART NOTE - NSCHARTNOTEFT_GEN_A_CORE
CT abdomen done showing ileus and possible fecal matter in rectum. Fecal disimpaction performed, small ball of stool retrieved with liquid stool excreted subsequently. CT abdomen done showing ileus and possible fecal matter in rectum. Fecal disimpaction performed, small ball of stool retrieved with liquid stool excreted subsequently. Spoke to Surgery RUBI Wood. Tube feeds stopped and PEG tube placed to suction with no output. No further recommendations at this time.

## 2021-05-13 NOTE — PROGRESS NOTE ADULT - ATTENDING COMMENTS
55 yr old  man , non smoker with  moody 1990s presented 3/14 with x9 days worsening cough, subjective fevers, and SOB, with x2-3 days dysuria and central, non-radiating, constant CP. Admitted to medicine unit  for acute hypoxic respiratory failure secondary to pna from covid-19 infection .     Assessment:  1. Acute hypoxic respiratory failure  2. Covid-19 infection   3. Transaminitis  4. Prediabetes  5. Bilateral pneumothorax  6. Septic shock - resolved  7. Anemia  8. bowel obstruction/ileus    Plan     - persistent left apical PTX  -S/p tracheostomy placement 4/21   -S/p G-tube 4/23  -Continue tube feedings  -Cont. mechanical ventilation   -Daily weaning trials  - apical chest tube to suction, base CT on waterseal  - tapering steroids  - wean sedation  -PT evaluation  -dvt/gi prophy  -hemodynamic monitoring   -Prognosis is guarded .

## 2021-05-13 NOTE — CONSULT NOTE ADULT - PROBLEM SELECTOR RECOMMENDATION 9
pt with covid 19.  Intubated on 3.29. Was sedated and propofol, fentanyl and given benzo for extended periods of time while pt was intubated.  + difficulties weaning pt off sedation.  + tachycardia, + tachypnea, + agitation, low grade fevers.  + diarrhea  - Pt started on methadone 5mg po q 12 hours on 5.1/21 and titrated up to q8 and then 10mg q 8.  pt was started on 30mg po q 8 hours on 5.12.2021 due to pt's agitation.  Methadone has an unpredictable half life and can remain in the body for to 5 days. pt with covid 19.  Intubated on 3.29. Was sedated and propofol, fentanyl and given benzo for extended periods of time while pt was intubated.  + difficulties weaning pt off sedation.  + tachycardia, + tachypnea, + agitation, low grade fevers.  + diarrhea  - Pt started on methadone 5mg po q 12 hours on 5.1/21 and titrated up to q8 and then 10mg q 8.  pt was started on 30mg po q 8 hours on 5.12.2021 due to pt's agitation.  Methadone has an unpredictable half life and can remain in the body for up to 5 days. Pt was on fentanyl infusion - unable to know for sure the dosages per hour the pt was receiving as the dosages changed over the days.  Methadone conversion from morphine conversion depends on the morphine equivalent the pt is receiving per day.  Pt may have received 300- 400mg Morphine Equil/ day.  Conversion ration - 10:1 (morphine mg/day : methadone).  Using this ratio :  30mg po q 8 hours is a higher than recommended. Would decrease to at least 15-20mg q 8 hours.  pt is also on seroquel which can increase QT interval. please check EKG in am.  - Pt was also on ativan  (intermediate ) iv atc -6mg- 12mg a day.  Pt now on klonopin po (long acting) 3mg po q 8 hours.  Can continue at this time.  Would recommend slowly titrating down as pt is weaned off precedex.  - Continue clonidine 0.2mg po q 8 hours - can decrease to BID dosing.  Monitor BP.  Can also you patch.   - CT scan showed ?ileus - pt with fecal Pt with covid 19.  Intubated on 3.29. Was sedated and propofol, fentanyl and given benzo for extended periods of time while pt was intubated.  + difficulties weaning pt off sedation.  + tachycardia, + tachypnea, + agitation, low grade fevers.  + diarrhea  - Pt started on methadone 5mg po q 12 hours on 5.1.21 and titrated up to q8 and then 10mg q 8.  pt was started on 30mg po q 8 hours on 5.12.2021 due to pt's agitation.  Methadone has an unpredictable half life and can remain in the body for up to 5 days. Pt was on fentanyl infusion - unable to know for sure the dosages per hour the pt was receiving as the dosages changed over the days.  Methadone conversion from morphine conversion depends on the morphine equivalent the pt is receiving per day.  Pt may have received 300- 400mg Morphine Equil/ day.  Conversion ration - 10:1 (morphine mg/day : methadone).  Using this ratio :  30mg po q 8 hours is a higher than recommended. Would decrease to at least 15-20mg q 8 hours.  pt is also on seroquel which can increase QT interval. please check EKG in am.  - Pt was also on ativan  (intermediate benzo) iv atc -6mg- 12mg a day.  Pt now on klonopin po (long acting benzo) 3mg po q 8 hours.  Can continue at this time.  Would recommend slowly titrating down as pt is weaned off precedex.  - Continue clonidine 0.2mg po q 8 hours - can decrease to BID dosing.  Monitor BP ( pt also on cardura).  Can also use patch.   - CT scan showed ?ileus - pt with fecal impaction. This may have been cause of diarrhea.  Stool softeners as per discretion. Surgery on case.  If patient is on the above methadone dosing - withdrawal should not occur.  - Eliminate other causes of tachycardia, tachypnea, agitation including infection, worsening pneumo, serotonin syndrome.  Evaluated meds - no significant meds can cause serotonin syndrome.  Pt may have had benzo withdrawal as well as opioid withdrawal causing those symptoms.  Would dc fentanyl and give hydromorphone po as prn breakthrough.

## 2021-05-13 NOTE — CONSULT NOTE ADULT - CONSULT REQUESTED DATE/TIME
30-Apr-2021 09:39
13-May-2021 17:52
19-Mar-2021 11:31
21-Apr-2021
23-Mar-2021 11:53
14-Apr-2021 13:45
15-Mar-2021 11:16
29-Mar-2021 13:45

## 2021-05-13 NOTE — PROGRESS NOTE ADULT - SUBJECTIVE AND OBJECTIVE BOX
INTERVAL HPI/OVERNIGHT EVENTS: Pt tachycardic to 160-170 yesterday afternoon and night. Pupils dilated. Did not respond to increased doses of opiates and benzos. Developed fever to 102.3 overnight with episode of vomiting. UA was positive. started empirically on vanc/zosyn. AM abdominal xray.     PRESSORS: [] YES [x] NO     WHICH:    Antimicrobial:  piperacillin/tazobactam IVPB.. 3.375 Gram(s) IV Intermittent every 8 hours  trimethoprim  160 mG/sulfamethoxazole 800 mG 1 Tablet(s) Oral <User Schedule>  vancomycin  IVPB 1250 milliGRAM(s) IV Intermittent every 12 hours  vancomycin  IVPB        Cardiovascular:  doxazosin 1 milliGRAM(s) Oral at bedtime  furosemide   Injectable 40 milliGRAM(s) IV Push daily  midodrine 20 milliGRAM(s) Oral every 8 hours    Pulmonary:  ALBUTerol    90 MICROgram(s) HFA Inhaler 2 Puff(s) Inhalation every 6 hours PRN    Hematalogic:  enoxaparin Injectable 40 milliGRAM(s) SubCutaneous every 24 hours    Other:  acetaminophen    Suspension .. 650 milliGRAM(s) Oral every 12 hours PRN  artificial  tears Solution 1 Drop(s) Both EYES every 4 hours PRN  ascorbic acid 1000 milliGRAM(s) Oral daily  BACItracin   Ointment 1 Application(s) Topical two times a day  bisacodyl Suppository 10 milliGRAM(s) Rectal daily  chlorhexidine 0.12% Liquid 15 milliLiter(s) Oral Mucosa every 12 hours  chlorhexidine 2% Cloths 1 Application(s) Topical <User Schedule>  clonazePAM  Tablet 3 milliGRAM(s) Oral every 8 hours  fentaNYL    Injectable 50 MICROGram(s) IV Push every 6 hours PRN  insulin lispro (ADMELOG) corrective regimen sliding scale   SubCutaneous every 6 hours  LORazepam   Injectable 2 milliGRAM(s) IV Push every 6 hours PRN  methadone    Tablet 30 milliGRAM(s) Oral every 8 hours  metoclopramide   Syrup 10 milliGRAM(s) Oral every 6 hours  pantoprazole   Suspension 40 milliGRAM(s) Oral daily  polyethylene glycol 3350 17 Gram(s) Oral two times a day  predniSONE   Tablet 30 milliGRAM(s) Oral daily  QUEtiapine 75 milliGRAM(s) Oral two times a day  sodium chloride 0.9% lock flush 10 milliLiter(s) IV Push every 1 hour PRN    acetaminophen    Suspension .. 650 milliGRAM(s) Oral every 12 hours PRN  ALBUTerol    90 MICROgram(s) HFA Inhaler 2 Puff(s) Inhalation every 6 hours PRN  artificial  tears Solution 1 Drop(s) Both EYES every 4 hours PRN  ascorbic acid 1000 milliGRAM(s) Oral daily  BACItracin   Ointment 1 Application(s) Topical two times a day  bisacodyl Suppository 10 milliGRAM(s) Rectal daily  chlorhexidine 0.12% Liquid 15 milliLiter(s) Oral Mucosa every 12 hours  chlorhexidine 2% Cloths 1 Application(s) Topical <User Schedule>  clonazePAM  Tablet 3 milliGRAM(s) Oral every 8 hours  doxazosin 1 milliGRAM(s) Oral at bedtime  enoxaparin Injectable 40 milliGRAM(s) SubCutaneous every 24 hours  fentaNYL    Injectable 50 MICROGram(s) IV Push every 6 hours PRN  furosemide   Injectable 40 milliGRAM(s) IV Push daily  insulin lispro (ADMELOG) corrective regimen sliding scale   SubCutaneous every 6 hours  LORazepam   Injectable 2 milliGRAM(s) IV Push every 6 hours PRN  methadone    Tablet 30 milliGRAM(s) Oral every 8 hours  metoclopramide   Syrup 10 milliGRAM(s) Oral every 6 hours  midodrine 20 milliGRAM(s) Oral every 8 hours  pantoprazole   Suspension 40 milliGRAM(s) Oral daily  piperacillin/tazobactam IVPB.. 3.375 Gram(s) IV Intermittent every 8 hours  polyethylene glycol 3350 17 Gram(s) Oral two times a day  predniSONE   Tablet 30 milliGRAM(s) Oral daily  QUEtiapine 75 milliGRAM(s) Oral two times a day  sodium chloride 0.9% lock flush 10 milliLiter(s) IV Push every 1 hour PRN  trimethoprim  160 mG/sulfamethoxazole 800 mG 1 Tablet(s) Oral <User Schedule>  vancomycin  IVPB 1250 milliGRAM(s) IV Intermittent every 12 hours  vancomycin  IVPB        Drug Dosing Weight  Height (cm): 167.6 (2021 23:15)  Weight (kg): 83.9 (2021 23:15)  BMI (kg/m2): 29.9 (2021 23:15)  BSA (m2): 1.93 (2021 23:15)    CENTRAL LINE: [x] YES [] NO  LOCATION:[facility]    DATE INSERTED:  REMOVE: [] YES [] NO  EXPLAIN:    RIVERA: [x] YES [] NO    DATE INSERTED:   REMOVE:  [] YES [] NO  EXPLAIN: Strict IOs    A-LINE:  [] YES [x] NO  LOCATION:   DATE INSERTED:  REMOVE:  [] YES [] NO  EXPLAIN:    PMH -reviewed admission note, no change since admission  PAST MEDICAL & SURGICAL HISTORY:  No pertinent past medical history    S/P moody  1990s    S/P appendectomy  1990s        ICU Vital Signs Last 24 Hrs  T(C): 37.3 (13 May 2021 04:56), Max: 39.1 (12 May 2021 19:00)  T(F): 99.1 (13 May 2021 04:56), Max: 102.3 (12 May 2021 19:00)  HR: 141 (13 May 2021 06:45) (77 - 174)  BP: 117/81 (13 May 2021 06:45) (97/54 - 171/76)  BP(mean): 84 (13 May 2021 06:45) (64 - 105)  ABP: --  ABP(mean): --  RR: 34 (13 May 2021 06:45) (19 - 48)  SpO2: 100% (13 May 2021 06:45) (96% - 100%)      ABG - ( 13 May 2021 04:31 )  pH, Arterial: 7.33  pH, Blood: x     /  pCO2: 67    /  pO2: 107   / HCO3: 35    / Base Excess: 7.0   /  SaO2: 98                    05-12 @ 07:01  -  -13 @ 07:00  --------------------------------------------------------  IN: 663.9 mL / OUT: 1598 mL / NET: -934.1 mL        Mode: AC/ CMV (Assist Control/ Continuous Mandatory Ventilation)  RR (machine): 14  TV (machine): 400  FiO2: 40  PEEP: 5  ITime: 0.7  MAP: 11  PIP: 30      REVIEW OF SYSTEMS:    Unable to complete - patient trached        PHYSICAL EXAM:    GENERAL: NAD, intermittently restless, diaphoretic  HEAD:  Atraumatic, Normocephalic  EYES: EOMI, PERRL, conjunctiva and sclera clear  NECK: Supple, normal appearance, No JVD; Normal thyroid; Trachea midline + trach  NERVOUS SYSTEM:  Awake + encephalopathy  Moving all 4 extremities  CHEST/LUNx chest tube on L. No chest deformity; Crackles b/l. No, rhonchi, wheezing   HEART: Tachycardic Regular rhythm; No murmurs, rubs, or gallops  ABDOMEN: PEG in place. Soft, Nontender, Nondistended; Bowel sounds present + PEG  EXTREMITIES:  Mild dependent edema. R arm edema and ecchymosis.  Peripheral Pulses intact, No clubbing, cyanosis,   SKIN: Intact, No rashes or lesions  Rivera with dark brown urine    LABS:  CBC Full  -  ( 13 May 2021 04:50 )  WBC Count : 10.90 K/uL  RBC Count : 2.31 M/uL  Hemoglobin : 8.9 g/dL  Hematocrit : 25.8 %  Platelet Count - Automated : 316 K/uL  Mean Cell Volume : 111.7 fl  Mean Cell Hemoglobin : 38.5 pg  Mean Cell Hemoglobin Concentration : 34.5 gm/dL  Auto Neutrophil # : x  Auto Lymphocyte # : x  Auto Monocyte # : x  Auto Eosinophil # : x  Auto Basophil # : x  Auto Neutrophil % : x  Auto Lymphocyte % : x  Auto Monocyte % : x  Auto Eosinophil % : x  Auto Basophil % : x    05-13    146<H>  |  109<H>  |  36<H>  ----------------------------<  109<H>  3.3<L>   |  35<H>  |  0.61    Ca    8.4      13 May 2021 04:50  Phos  4.3     -  Mg     2.0     -    TPro  6.8  /  Alb  3.0<L>  /  TBili  1.2  /  DBili  x   /  AST  37  /  ALT  59  /  AlkPhos  79  -      Urinalysis Basic - ( 12 May 2021 22:10 )    Color: Brown / Appearance: very cloudy / S.020 / pH: x  Gluc: x / Ketone: Trace  / Bili: Negative / Urobili: 4   Blood: x / Protein: 500 mg/dL / Nitrite: Negative   Leuk Esterase: Trace / RBC: 10-25 /HPF / WBC >50 /HPF   Sq Epi: x / Non Sq Epi: Few /HPF / Bacteria: Moderate /HPF          RADIOLOGY & ADDITIONAL STUDIES REVIEWED:  ***      GOALS OF CARE DISCUSSION WITH PATIENT/FAMILY/PROXY:     INTERVAL HPI/OVERNIGHT EVENTS: Pt tachycardic to 160-170 yesterday afternoon and night. Pupils dilated. Did not respond to increased doses of opiates and benzos. Developed fever to 102.3 overnight with episode of vomiting. UA was positive. started empirically on vanc/zosyn - will d/c for now as no clear source of infection. AM abdominal xray w/ likely ileus. Will restart Free water. Hold Lasix. Will obtain CT Chest and abd/pelvis with oral contrast. Also will restart Precedex as likely in withdrawal and clonid    PRESSORS: [] YES [x] NO     WHICH:    Antimicrobial:  piperacillin/tazobactam IVPB.. 3.375 Gram(s) IV Intermittent every 8 hours  trimethoprim  160 mG/sulfamethoxazole 800 mG 1 Tablet(s) Oral <User Schedule>  vancomycin  IVPB 1250 milliGRAM(s) IV Intermittent every 12 hours  vancomycin  IVPB        Cardiovascular:  doxazosin 1 milliGRAM(s) Oral at bedtime  furosemide   Injectable 40 milliGRAM(s) IV Push daily  midodrine 20 milliGRAM(s) Oral every 8 hours    Pulmonary:  ALBUTerol    90 MICROgram(s) HFA Inhaler 2 Puff(s) Inhalation every 6 hours PRN    Hematalogic:  enoxaparin Injectable 40 milliGRAM(s) SubCutaneous every 24 hours    Other:  acetaminophen    Suspension .. 650 milliGRAM(s) Oral every 12 hours PRN  artificial  tears Solution 1 Drop(s) Both EYES every 4 hours PRN  ascorbic acid 1000 milliGRAM(s) Oral daily  BACItracin   Ointment 1 Application(s) Topical two times a day  bisacodyl Suppository 10 milliGRAM(s) Rectal daily  chlorhexidine 0.12% Liquid 15 milliLiter(s) Oral Mucosa every 12 hours  chlorhexidine 2% Cloths 1 Application(s) Topical <User Schedule>  clonazePAM  Tablet 3 milliGRAM(s) Oral every 8 hours  fentaNYL    Injectable 50 MICROGram(s) IV Push every 6 hours PRN  insulin lispro (ADMELOG) corrective regimen sliding scale   SubCutaneous every 6 hours  LORazepam   Injectable 2 milliGRAM(s) IV Push every 6 hours PRN  methadone    Tablet 30 milliGRAM(s) Oral every 8 hours  metoclopramide   Syrup 10 milliGRAM(s) Oral every 6 hours  pantoprazole   Suspension 40 milliGRAM(s) Oral daily  polyethylene glycol 3350 17 Gram(s) Oral two times a day  predniSONE   Tablet 30 milliGRAM(s) Oral daily  QUEtiapine 75 milliGRAM(s) Oral two times a day  sodium chloride 0.9% lock flush 10 milliLiter(s) IV Push every 1 hour PRN    acetaminophen    Suspension .. 650 milliGRAM(s) Oral every 12 hours PRN  ALBUTerol    90 MICROgram(s) HFA Inhaler 2 Puff(s) Inhalation every 6 hours PRN  artificial  tears Solution 1 Drop(s) Both EYES every 4 hours PRN  ascorbic acid 1000 milliGRAM(s) Oral daily  BACItracin   Ointment 1 Application(s) Topical two times a day  bisacodyl Suppository 10 milliGRAM(s) Rectal daily  chlorhexidine 0.12% Liquid 15 milliLiter(s) Oral Mucosa every 12 hours  chlorhexidine 2% Cloths 1 Application(s) Topical <User Schedule>  clonazePAM  Tablet 3 milliGRAM(s) Oral every 8 hours  doxazosin 1 milliGRAM(s) Oral at bedtime  enoxaparin Injectable 40 milliGRAM(s) SubCutaneous every 24 hours  fentaNYL    Injectable 50 MICROGram(s) IV Push every 6 hours PRN  furosemide   Injectable 40 milliGRAM(s) IV Push daily  insulin lispro (ADMELOG) corrective regimen sliding scale   SubCutaneous every 6 hours  LORazepam   Injectable 2 milliGRAM(s) IV Push every 6 hours PRN  methadone    Tablet 30 milliGRAM(s) Oral every 8 hours  metoclopramide   Syrup 10 milliGRAM(s) Oral every 6 hours  midodrine 20 milliGRAM(s) Oral every 8 hours  pantoprazole   Suspension 40 milliGRAM(s) Oral daily  piperacillin/tazobactam IVPB.. 3.375 Gram(s) IV Intermittent every 8 hours  polyethylene glycol 3350 17 Gram(s) Oral two times a day  predniSONE   Tablet 30 milliGRAM(s) Oral daily  QUEtiapine 75 milliGRAM(s) Oral two times a day  sodium chloride 0.9% lock flush 10 milliLiter(s) IV Push every 1 hour PRN  trimethoprim  160 mG/sulfamethoxazole 800 mG 1 Tablet(s) Oral <User Schedule>  vancomycin  IVPB 1250 milliGRAM(s) IV Intermittent every 12 hours  vancomycin  IVPB        Drug Dosing Weight  Height (cm): 167.6 (2021 23:15)  Weight (kg): 83.9 (2021 23:15)  BMI (kg/m2): 29.9 (2021 23:15)  BSA (m2): 1.93 (2021 23:15)    CENTRAL LINE: [x] YES [] NO  LOCATION:[facility]    DATE INSERTED:  REMOVE: [] YES [] NO  EXPLAIN:    RIVERA: [x] YES [] NO    DATE INSERTED:   REMOVE:  [] YES [] NO  EXPLAIN: Strict IOs    A-LINE:  [] YES [x] NO  LOCATION:   DATE INSERTED:  REMOVE:  [] YES [] NO  EXPLAIN:    PMH -reviewed admission note, no change since admission  PAST MEDICAL & SURGICAL HISTORY:  No pertinent past medical history    S/P moody  1990s    S/P appendectomy  1990s        ICU Vital Signs Last 24 Hrs  T(C): 37.3 (13 May 2021 04:56), Max: 39.1 (12 May 2021 19:00)  T(F): 99.1 (13 May 2021 04:56), Max: 102.3 (12 May 2021 19:00)  HR: 141 (13 May 2021 06:45) (77 - 174)  BP: 117/81 (13 May 2021 06:45) (97/54 - 171/76)  BP(mean): 84 (13 May 2021 06:45) (64 - 105)  ABP: --  ABP(mean): --  RR: 34 (13 May 2021 06:45) (19 - 48)  SpO2: 100% (13 May 2021 06:45) (96% - 100%)      ABG - ( 13 May 2021 04:31 )  pH, Arterial: 7.33  pH, Blood: x     /  pCO2: 67    /  pO2: 107   / HCO3: 35    / Base Excess: 7.0   /  SaO2: 98                    -12 @ 07:01  -  -13 @ 07:00  --------------------------------------------------------  IN: 663.9 mL / OUT: 1598 mL / NET: -934.1 mL        Mode: AC/ CMV (Assist Control/ Continuous Mandatory Ventilation)  RR (machine): 14  TV (machine): 400  FiO2: 40  PEEP: 5  ITime: 0.7  MAP: 11  PIP: 30      REVIEW OF SYSTEMS:    Unable to complete - patient trached        PHYSICAL EXAM:    GENERAL: NAD, intermittently restless, diaphoretic  HEAD:  Atraumatic, Normocephalic  EYES: EOMI, PERRL, conjunctiva and sclera clear  NECK: Supple, normal appearance, No JVD; Normal thyroid; Trachea midline + trach  NERVOUS SYSTEM:  Awake + encephalopathy  Moving all 4 extremities  CHEST/LUNx chest tube on L. No chest deformity; Crackles b/l. No, rhonchi, wheezing   HEART: Tachycardic Regular rhythm; No murmurs, rubs, or gallops  ABDOMEN: PEG in place. Soft, Nontender, Nondistended; Bowel sounds present + PEG  EXTREMITIES:  Mild dependent edema. R arm edema and ecchymosis.  Peripheral Pulses intact, No clubbing, cyanosis,   SKIN: Intact, No rashes or lesions  Rivera with dark brown urine    LABS:  CBC Full  -  ( 13 May 2021 04:50 )  WBC Count : 10.90 K/uL  RBC Count : 2.31 M/uL  Hemoglobin : 8.9 g/dL  Hematocrit : 25.8 %  Platelet Count - Automated : 316 K/uL  Mean Cell Volume : 111.7 fl  Mean Cell Hemoglobin : 38.5 pg  Mean Cell Hemoglobin Concentration : 34.5 gm/dL  Auto Neutrophil # : x  Auto Lymphocyte # : x  Auto Monocyte # : x  Auto Eosinophil # : x  Auto Basophil # : x  Auto Neutrophil % : x  Auto Lymphocyte % : x  Auto Monocyte % : x  Auto Eosinophil % : x  Auto Basophil % : x        146<H>  |  109<H>  |  36<H>  ----------------------------<  109<H>  3.3<L>   |  35<H>  |  0.61    Ca    8.4      13 May 2021 04:50  Phos  4.3     -  Mg     2.0     -    TPro  6.8  /  Alb  3.0<L>  /  TBili  1.2  /  DBili  x   /  AST  37  /  ALT  59  /  AlkPhos  79  -      Urinalysis Basic - ( 12 May 2021 22:10 )    Color: Brown / Appearance: very cloudy / S.020 / pH: x  Gluc: x / Ketone: Trace  / Bili: Negative / Urobili: 4   Blood: x / Protein: 500 mg/dL / Nitrite: Negative   Leuk Esterase: Trace / RBC: 10-25 /HPF / WBC >50 /HPF   Sq Epi: x / Non Sq Epi: Few /HPF / Bacteria: Moderate /HPF          RADIOLOGY & ADDITIONAL STUDIES REVIEWED:  ***      GOALS OF CARE DISCUSSION WITH PATIENT/FAMILY/PROXY:

## 2021-05-13 NOTE — CONSULT NOTE ADULT - CONSULT REASON
agitation
pneumonia
Open gastrostomy
ptx
Acute hypoxic respiratory failure
anemia
56 y/o M with respiratory failure 2/2 covid-19 PNA; emergently intubated this morning. Request to identify surrogate.
Covid-19

## 2021-05-13 NOTE — PROGRESS NOTE ADULT - SUBJECTIVE AND OBJECTIVE BOX
Patient is a 55y old  Male who presents with a chief complaint of SOB (13 May 2021 07:12)  Patient is awake, alert, comfortable in bed in NAD. Mildly tachypneic on ventilator via trach    INTERVAL HPI/OVERNIGHT EVENTS:      VITAL SIGNS:  T(F): 100.2 (21 @ 07:30)  HR: 148 (21 @ 09:00)  BP: 152/88 (21 @ 09:00)  RR: 35 (21 @ 09:00)  SpO2: 95% (21 @ 09:00)  Wt(kg): --  I&O's Detail    12 May 2021 07:01  -  13 May 2021 07:00  --------------------------------------------------------  IN:    Dexmedetomidine: 63 mL    IV PiggyBack: 250 mL    IV PiggyBack: 100 mL    Jevity 1.5: 120 mL    Phenylephrine: 130.9 mL  Total IN: 663.9 mL    OUT:    Chest Tube (mL): 10 mL    Chest Tube (mL): 23 mL    Indwelling Catheter - Urethral (mL): 1895 mL  Total OUT: 1928 mL    Total NET: -1264.1 mL      13 May 2021 07:01  -  13 May 2021 10:39  --------------------------------------------------------  IN:  Total IN: 0 mL    OUT:    Indwelling Catheter - Urethral (mL): 800 mL  Total OUT: 800 mL    Total NET: -800 mL        Mode: AC/ CMV (Assist Control/ Continuous Mandatory Ventilation)  RR (machine): 14  TV (machine): 400  FiO2: 40  PEEP: 0.5  ITime: 0.7  MAP: 17  PIP: 41        REVIEW OF SYSTEMS:    CONSTITUTIONAL:  No fevers, chills, sweats    HEENT:  Eyes:  No diplopia or blurred vision. ENT:  No earache, sore throat or runny nose.    CARDIOVASCULAR:  No pressure, squeezing, tightness, or heaviness about the chest; no palpitations.    RESPIRATORY:  Per HPI    GASTROINTESTINAL:  No abdominal pain, nausea, vomiting or diarrhea.    GENITOURINARY:  No dysuria, frequency or urgency.    NEUROLOGIC:  No paresthesias, fasciculations, seizures or weakness.    PSYCHIATRIC:  No disorder of thought or mood.      PHYSICAL EXAM:    Constitutional: Well developed and nourished  Eyes:Perrla  ENMT: normal  Neck:supple  Respiratory: Ronchi bilaterally  Cardiovascular: S1 S2 regular  Gastrointestinal: Soft, Non tender  Extremities: No edema  Vascular:normal  Neurological:Awake, alert,Ox3  Musculoskeletal:Normal      MEDICATIONS  (STANDING):  ascorbic acid 1000 milliGRAM(s) Oral daily  BACItracin   Ointment 1 Application(s) Topical two times a day  bisacodyl Suppository 10 milliGRAM(s) Rectal daily  chlorhexidine 0.12% Liquid 15 milliLiter(s) Oral Mucosa every 12 hours  chlorhexidine 2% Cloths 1 Application(s) Topical <User Schedule>  clonazePAM  Tablet 3 milliGRAM(s) Oral every 8 hours  dexMEDEtomidine Infusion 1.5 MICROgram(s)/kG/Hr (31.5 mL/Hr) IV Continuous <Continuous>  doxazosin 1 milliGRAM(s) Oral at bedtime  enoxaparin Injectable 40 milliGRAM(s) SubCutaneous every 24 hours  insulin lispro (ADMELOG) corrective regimen sliding scale   SubCutaneous every 6 hours  iohexol 300 mG (iodine)/mL Oral Solution 30 milliLiter(s) Oral once  methadone    Tablet 30 milliGRAM(s) Oral every 8 hours  metoclopramide   Syrup 10 milliGRAM(s) Oral every 6 hours  midodrine 20 milliGRAM(s) Oral every 8 hours  pantoprazole   Suspension 40 milliGRAM(s) Oral daily  polyethylene glycol 3350 17 Gram(s) Oral two times a day  potassium chloride  10 mEq/100 mL IVPB 10 milliEquivalent(s) IV Intermittent every 1 hour  predniSONE   Tablet 30 milliGRAM(s) Oral daily  QUEtiapine 75 milliGRAM(s) Oral two times a day  trimethoprim  160 mG/sulfamethoxazole 800 mG 1 Tablet(s) Oral <User Schedule>    MEDICATIONS  (PRN):  acetaminophen    Suspension .. 650 milliGRAM(s) Oral every 12 hours PRN Temp greater or equal to 38C (100.4F)  ALBUTerol    90 MICROgram(s) HFA Inhaler 2 Puff(s) Inhalation every 6 hours PRN Shortness of Breath and/or Wheezing  artificial  tears Solution 1 Drop(s) Both EYES every 4 hours PRN Dry Eyes  fentaNYL    Injectable 50 MICROGram(s) IV Push every 6 hours PRN Severe Pain (7 - 10)  LORazepam   Injectable 2 milliGRAM(s) IV Push every 6 hours PRN Agitation  sodium chloride 0.9% lock flush 10 milliLiter(s) IV Push every 1 hour PRN Pre/post blood products, medications, blood draw, and to maintain line patency      Allergies    No Known Allergies    Intolerances        LABS:                        8.9    10.90 )-----------( 316      ( 13 May 2021 04:50 )             25.8     05    146<H>  |  109<H>  |  36<H>  ----------------------------<  109<H>  3.3<L>   |  35<H>  |  0.61    Ca    8.4      13 May 2021 04:50  Phos  4.3       Mg     2.0         TPro  6.8  /  Alb  3.0<L>  /  TBili  1.2  /  DBili  x   /  AST  37  /  ALT  59  /  AlkPhos  79        Urinalysis Basic - ( 12 May 2021 22:10 )    Color: Brown / Appearance: very cloudy / S.020 / pH: x  Gluc: x / Ketone: Trace  / Bili: Negative / Urobili: 4   Blood: x / Protein: 500 mg/dL / Nitrite: Negative   Leuk Esterase: Trace / RBC: 10-25 /HPF / WBC >50 /HPF   Sq Epi: x / Non Sq Epi: Few /HPF / Bacteria: Moderate /HPF      ABG - ( 13 May 2021 04:31 )  pH, Arterial: 7.33  pH, Blood: x     /  pCO2: 67    /  pO2: 107   / HCO3: 35    / Base Excess: 7.0   /  SaO2: 98                CARDIAC MARKERS ( 12 May 2021 22:49 )  x     / x     / 21 U/L / x     / x          CAPILLARY BLOOD GLUCOSE      POCT Blood Glucose.: 110 mg/dL (13 May 2021 05:32)  POCT Blood Glucose.: 131 mg/dL (13 May 2021 00:20)  POCT Blood Glucose.: 157 mg/dL (12 May 2021 16:30)  POCT Blood Glucose.: 162 mg/dL (12 May 2021 10:43)        RADIOLOGY & ADDITIONAL TESTS:    CXR:  < from: Xray Chest 1 View- PORTABLE-Routine (Xray Chest 1 View- PORTABLE-Routine in AM.) (21 @ 09:14) >  IMPRESSION:  Resolving infiltrates. Resolving left pneumothorax.      < end of copied text >    Ct scan chest:    ekg;    echo:

## 2021-05-14 LAB
ALBUMIN SERPL ELPH-MCNC: 2.8 G/DL — LOW (ref 3.5–5)
ALP SERPL-CCNC: 75 U/L — SIGNIFICANT CHANGE UP (ref 40–120)
ALT FLD-CCNC: 58 U/L DA — SIGNIFICANT CHANGE UP (ref 10–60)
ANION GAP SERPL CALC-SCNC: -2 MMOL/L — LOW (ref 5–17)
AST SERPL-CCNC: 29 U/L — SIGNIFICANT CHANGE UP (ref 10–40)
BASE EXCESS BLDA CALC-SCNC: 11.5 MMOL/L — HIGH (ref -2–3)
BASE EXCESS BLDA CALC-SCNC: 15.5 MMOL/L — HIGH (ref -2–3)
BILIRUB SERPL-MCNC: 0.9 MG/DL — SIGNIFICANT CHANGE UP (ref 0.2–1.2)
BLOOD GAS COMMENTS ARTERIAL: SIGNIFICANT CHANGE UP
BLOOD GAS COMMENTS ARTERIAL: SIGNIFICANT CHANGE UP
BUN SERPL-MCNC: 29 MG/DL — HIGH (ref 7–18)
CALCIUM SERPL-MCNC: 8.8 MG/DL — SIGNIFICANT CHANGE UP (ref 8.4–10.5)
CHLORIDE SERPL-SCNC: 104 MMOL/L — SIGNIFICANT CHANGE UP (ref 96–108)
CO2 SERPL-SCNC: 40 MMOL/L — HIGH (ref 22–31)
CREAT SERPL-MCNC: 0.35 MG/DL — LOW (ref 0.5–1.3)
GLUCOSE BLDC GLUCOMTR-MCNC: 114 MG/DL — HIGH (ref 70–99)
GLUCOSE BLDC GLUCOMTR-MCNC: 121 MG/DL — HIGH (ref 70–99)
GLUCOSE BLDC GLUCOMTR-MCNC: 98 MG/DL — SIGNIFICANT CHANGE UP (ref 70–99)
GLUCOSE SERPL-MCNC: 98 MG/DL — SIGNIFICANT CHANGE UP (ref 70–99)
HCO3 BLDA-SCNC: 42 MMOL/L — HIGH (ref 21–28)
HCO3 BLDA-SCNC: 44 MMOL/L — HIGH (ref 21–28)
HCT VFR BLD CALC: 25.3 % — LOW (ref 39–50)
HGB BLD-MCNC: 8.3 G/DL — LOW (ref 13–17)
HOROWITZ INDEX BLDA+IHG-RTO: 40 — SIGNIFICANT CHANGE UP
HOROWITZ INDEX BLDA+IHG-RTO: 40 — SIGNIFICANT CHANGE UP
MAGNESIUM SERPL-MCNC: 2.2 MG/DL — SIGNIFICANT CHANGE UP (ref 1.6–2.6)
MCHC RBC-ENTMCNC: 32.8 GM/DL — SIGNIFICANT CHANGE UP (ref 32–36)
MCHC RBC-ENTMCNC: 35.9 PG — HIGH (ref 27–34)
MCV RBC AUTO: 109.5 FL — HIGH (ref 80–100)
NRBC # BLD: 0 /100 WBCS — SIGNIFICANT CHANGE UP (ref 0–0)
PCO2 BLDA: 71 MMHG — CRITICAL HIGH (ref 35–48)
PCO2 BLDA: 90 MMHG — CRITICAL HIGH (ref 35–48)
PH BLDA: 7.28 — LOW (ref 7.35–7.45)
PH BLDA: 7.4 — SIGNIFICANT CHANGE UP (ref 7.35–7.45)
PHOSPHATE SERPL-MCNC: 2.8 MG/DL — SIGNIFICANT CHANGE UP (ref 2.5–4.5)
PLATELET # BLD AUTO: 236 K/UL — SIGNIFICANT CHANGE UP (ref 150–400)
PO2 BLDA: 93 MMHG — SIGNIFICANT CHANGE UP (ref 83–108)
PO2 BLDA: 96 MMHG — SIGNIFICANT CHANGE UP (ref 83–108)
POTASSIUM SERPL-MCNC: 4.2 MMOL/L — SIGNIFICANT CHANGE UP (ref 3.5–5.3)
POTASSIUM SERPL-SCNC: 4.2 MMOL/L — SIGNIFICANT CHANGE UP (ref 3.5–5.3)
PROT SERPL-MCNC: 6.4 G/DL — SIGNIFICANT CHANGE UP (ref 6–8.3)
RBC # BLD: 2.31 M/UL — LOW (ref 4.2–5.8)
RBC # FLD: 17.6 % — HIGH (ref 10.3–14.5)
SAO2 % BLDA: 99 % — SIGNIFICANT CHANGE UP
SAO2 % BLDA: 99 % — SIGNIFICANT CHANGE UP
SARS-COV-2 RNA SPEC QL NAA+PROBE: SIGNIFICANT CHANGE UP
SODIUM SERPL-SCNC: 142 MMOL/L — SIGNIFICANT CHANGE UP (ref 135–145)
WBC # BLD: 7.62 K/UL — SIGNIFICANT CHANGE UP (ref 3.8–10.5)
WBC # FLD AUTO: 7.62 K/UL — SIGNIFICANT CHANGE UP (ref 3.8–10.5)

## 2021-05-14 PROCEDURE — 74018 RADEX ABDOMEN 1 VIEW: CPT | Mod: 26

## 2021-05-14 PROCEDURE — 71045 X-RAY EXAM CHEST 1 VIEW: CPT | Mod: 26

## 2021-05-14 PROCEDURE — 99291 CRITICAL CARE FIRST HOUR: CPT

## 2021-05-14 PROCEDURE — 71045 X-RAY EXAM CHEST 1 VIEW: CPT | Mod: 26,77

## 2021-05-14 RX ORDER — METHADONE HYDROCHLORIDE 40 MG/1
10 TABLET ORAL EVERY 8 HOURS
Refills: 0 | Status: DISCONTINUED | OUTPATIENT
Start: 2021-05-14 | End: 2021-05-21

## 2021-05-14 RX ADMIN — Medication 1 APPLICATION(S): at 17:30

## 2021-05-14 RX ADMIN — QUETIAPINE FUMARATE 75 MILLIGRAM(S): 200 TABLET, FILM COATED ORAL at 17:29

## 2021-05-14 RX ADMIN — POLYETHYLENE GLYCOL 3350 17 GRAM(S): 17 POWDER, FOR SOLUTION ORAL at 05:45

## 2021-05-14 RX ADMIN — Medication 1 APPLICATION(S): at 05:28

## 2021-05-14 RX ADMIN — ENOXAPARIN SODIUM 40 MILLIGRAM(S): 100 INJECTION SUBCUTANEOUS at 11:23

## 2021-05-14 RX ADMIN — Medication 3 MILLIGRAM(S): at 14:45

## 2021-05-14 RX ADMIN — Medication 1000 MILLIGRAM(S): at 11:23

## 2021-05-14 RX ADMIN — Medication 30 MILLIGRAM(S): at 05:45

## 2021-05-14 RX ADMIN — CHLORHEXIDINE GLUCONATE 15 MILLILITER(S): 213 SOLUTION TOPICAL at 17:29

## 2021-05-14 RX ADMIN — Medication 10 MILLIGRAM(S): at 05:45

## 2021-05-14 RX ADMIN — Medication 0.2 MILLIGRAM(S): at 14:45

## 2021-05-14 RX ADMIN — Medication 10 MILLIGRAM(S): at 17:30

## 2021-05-14 RX ADMIN — PANTOPRAZOLE SODIUM 40 MILLIGRAM(S): 20 TABLET, DELAYED RELEASE ORAL at 11:23

## 2021-05-14 RX ADMIN — CHLORHEXIDINE GLUCONATE 15 MILLILITER(S): 213 SOLUTION TOPICAL at 05:26

## 2021-05-14 RX ADMIN — Medication 10 MILLIGRAM(S): at 23:51

## 2021-05-14 RX ADMIN — Medication 3 MILLIGRAM(S): at 05:45

## 2021-05-14 RX ADMIN — Medication 10 MILLIGRAM(S): at 11:22

## 2021-05-14 RX ADMIN — METHADONE HYDROCHLORIDE 10 MILLIGRAM(S): 40 TABLET ORAL at 14:45

## 2021-05-14 RX ADMIN — Medication 1 TABLET(S): at 11:23

## 2021-05-14 RX ADMIN — METHADONE HYDROCHLORIDE 30 MILLIGRAM(S): 40 TABLET ORAL at 05:45

## 2021-05-14 RX ADMIN — POLYETHYLENE GLYCOL 3350 17 GRAM(S): 17 POWDER, FOR SOLUTION ORAL at 17:32

## 2021-05-14 RX ADMIN — Medication 0.2 MILLIGRAM(S): at 21:26

## 2021-05-14 RX ADMIN — QUETIAPINE FUMARATE 75 MILLIGRAM(S): 200 TABLET, FILM COATED ORAL at 05:45

## 2021-05-14 RX ADMIN — CHLORHEXIDINE GLUCONATE 1 APPLICATION(S): 213 SOLUTION TOPICAL at 05:26

## 2021-05-14 RX ADMIN — Medication 0.2 MILLIGRAM(S): at 05:46

## 2021-05-14 RX ADMIN — Medication 3 MILLIGRAM(S): at 21:26

## 2021-05-14 RX ADMIN — METHADONE HYDROCHLORIDE 10 MILLIGRAM(S): 40 TABLET ORAL at 21:25

## 2021-05-14 NOTE — PROGRESS NOTE ADULT - SUBJECTIVE AND OBJECTIVE BOX
Patient is a 55y old  Male who presents with a chief complaint of SOB (13 May 2021 17:51)    pt seen in icu [ x ], reg med floor [   ], bed [ x ], chair at bedside [   ], awake and responsive [ x ], mildly sedated, [  ],    nad [x  ]        Allergies    No Known Allergies        Vitals    T(F): 96.3 (05-14-21 @ 04:31), Max: 100.2 (05-13-21 @ 07:30)  HR: 109 (05-14-21 @ 06:00) (95 - 152)  BP: 114/64 (05-14-21 @ 06:00) (88/53 - 157/81)  RR: 9 (05-14-21 @ 06:00) (0 - 39)  SpO2: 97% (05-14-21 @ 06:00) (95% - 100%)  Wt(kg): --  CAPILLARY BLOOD GLUCOSE      POCT Blood Glucose.: 123 mg/dL (13 May 2021 23:07)      Labs                          8.3    7.62  )-----------( 236      ( 14 May 2021 05:28 )             25.3       05-14    142  |  104  |  29<H>  ----------------------------<  98  4.2   |  40<H>  |  0.35<L>    Ca    8.8      14 May 2021 05:28  Phos  2.8     05-14  Mg     2.2     05-14    TPro  6.4  /  Alb  2.8<L>  /  TBili  0.9  /  DBili  x   /  AST  29  /  ALT  58  /  AlkPhos  75  05-14      CARDIAC MARKERS ( 12 May 2021 22:49 )  x     / x     / 21 U/L / x     / x            .Sputum Sputum  04-16 @ 04:42   Moderate Enterobacter aerogenes (Carbapenem Resistant)  Normal Respiratory Monserrat present  --  Enterobacter aerogenes (Carbapenem Resistant)      .Urine Clean Catch (Midstream)  03-15 @ 00:52   No growth  --  --          Radiology Results      Meds    MEDICATIONS  (STANDING):  ascorbic acid 1000 milliGRAM(s) Oral daily  BACItracin   Ointment 1 Application(s) Topical two times a day  bisacodyl Suppository 10 milliGRAM(s) Rectal daily  chlorhexidine 0.12% Liquid 15 milliLiter(s) Oral Mucosa every 12 hours  chlorhexidine 2% Cloths 1 Application(s) Topical <User Schedule>  clonazePAM  Tablet 3 milliGRAM(s) Oral every 8 hours  cloNIDine 0.2 milliGRAM(s) Oral every 8 hours  dexMEDEtomidine Infusion 1.5 MICROgram(s)/kG/Hr (31.5 mL/Hr) IV Continuous <Continuous>  doxazosin 1 milliGRAM(s) Oral at bedtime  enoxaparin Injectable 40 milliGRAM(s) SubCutaneous every 24 hours  insulin lispro (ADMELOG) corrective regimen sliding scale   SubCutaneous every 6 hours  methadone    Tablet 30 milliGRAM(s) Oral every 8 hours  metoclopramide   Syrup 10 milliGRAM(s) Oral every 6 hours  pantoprazole   Suspension 40 milliGRAM(s) Oral daily  polyethylene glycol 3350 17 Gram(s) Oral two times a day  predniSONE   Tablet 30 milliGRAM(s) Oral daily  QUEtiapine 75 milliGRAM(s) Oral two times a day  trimethoprim  160 mG/sulfamethoxazole 800 mG 1 Tablet(s) Oral <User Schedule>      MEDICATIONS  (PRN):  acetaminophen    Suspension .. 650 milliGRAM(s) Oral every 12 hours PRN Temp greater or equal to 38C (100.4F)  ALBUTerol    90 MICROgram(s) HFA Inhaler 2 Puff(s) Inhalation every 6 hours PRN Shortness of Breath and/or Wheezing  artificial  tears Solution 1 Drop(s) Both EYES every 4 hours PRN Dry Eyes  fentaNYL    Injectable 50 MICROGram(s) IV Push every 6 hours PRN Severe Pain (7 - 10)  LORazepam   Injectable 2 milliGRAM(s) IV Push every 6 hours PRN Agitation  sodium chloride 0.9% lock flush 10 milliLiter(s) IV Push every 1 hour PRN Pre/post blood products, medications, blood draw, and to maintain line patency      Physical Exam    Neuro :  no focal deficits  Respiratory: CTA B/L  CV: RRR, S1S2, no murmurs,   Abdominal: Soft, NT, ND +BS, g- tube in place, dressing cdi  Extremities: b/l ue edema, + peripheral pulses      ASSESSMENT    Hypoxemia 2nd to covid pna   transaminitis  prediabetes  h/o appendectomy  cholecystectomy        PLAN    contact and airborne isolation  d/c remdesevir given covid ab positive noted   completed dexamethasone   started pulse steroids for 3 days - 250mg solumedrol bid now tapered off 4/14/21   C/w prednisone 40 daily for possible organizing pneumonia   cont on bactrim empirically, TIW   cont asa, vit c,    cont albuterol inhaler   pulm f/u  procalcitonin, D-dimer, crp, ldh, ferritin, lactate noted ,    cont tylenol prn,   cont robitussin prn   pt on precedex drip    pt on fentanyl patch  pt on phenylephrine drip   cont midodrine    s/p intubation 3/29/21   s/p tracheostomy 4/21/21  O2 sat 100% (93% - 100%) mv 40%  O2 via mech vent and taper fio2 as tolerated   vent mgmt as per icu  xray 3/19/21 with pneumomediastinum  rept cxr with New trace right apical pneumothorax. New mild left apical pneumothorax. Grossly stable small pneumomediastinum.  Soft tissue emphysema at the neck bases bilaterally. Grossly stable bilateral pulmonary infiltrates noted.   cxr 2/24 with No evidence of pneumothorax can be appreciated on the available image. This may be related to patient positioning. Evidence of pneumomediastinum and subcutaneous emphysema in the lower neck is again noted. There are patchy bibasilar infiltrates and elevated right hemidiaphragm noted.   cxr 3/29/21 with No significant change bilateral infiltrates. There is a small simple left apical pneumothorax. No significant pleural effusion. Bilateral subcutaneous emphysema similar to prior.   XRs on 7AM and 5PM with no obvious increase of ptx  cxr 4/4/21 with Improving bilateral airspace disease noted    cxr 4/8/21 with Small left pneumothorax noted.  thoracic surg f/u   s/p L chest tube replacement 4/18/21 for recurrence of left pneumothorax   cxr 4/20/21 with Unchanged advanced infiltrates and catheter left chest tube noted   CXR 4/11/21 with : No interval change compared to one day prior. No pneumothorax noted.   unable to flush or aspirate tube fully, noted debris in tubing which was milked out.   Once material removed from tubing able to aspirated and flush fully. Also changed dressing on pigtail which appears to be in good position.   Monitor O2 status   s/p tracheostomy 4/21/21   stay sutures out 2 weeks from OR date  cxr 4/21/21 with No evidence of active chest disease. Tracheostomy tube in place otherwise no significant change noted   cxr 4/25/21 with A 40% LEFT pneumothorax. LEFT multi-sidehole pigtail catheter overlies LEFT lower hemithorax.. Bilateral multifocal and diffuse ill-defined airspace opacities..  Follow-up AP portable chest radiograph 4/25/2021 AT 8:58 AM: Residual LEFT 30% upper zone pneumothorax. Otherwise no interval change.noted    cxr 4/30/21 Tracheostomy tube again noted. Left chest tube noted with small left apical pneumothorax unchanged. Bilateral airspace opacities overall worsening. Heart size cannot be accurately assessed in this projection noted.   cxr 5/3/21 with Large left pneumothorax noted above.   cxr 5 4/21 with No significant interval change noted above.   ct chest with Extensive chronic appearing consolidative opacities throughout the lungs, most prominent in the left lower lobe and lingula, with bronchiectasis, scarring, and volume loss. Additional scattered peripheral coarse opacities.   Mild left pneumothorax. Left lateral pleural space pigtail catheter, not in continuity with the pneumothorax. Mild bilateral hydronephrosis, similar to appearance on CT of the abdomen and pelvis on April 27. noted above   s/p 2nd chest tube 5/5/21  cxr 5/6/21 with Tracheostomy and catheter left chest tubes remain. , There are significant diffuse advanced infiltrates again noted. No pneumothorax. Above findings are similar to study earlier in the day. Present film shows a left jugular line inserted   with tip entering the superior vena cava noted   cxr 5/11/21 with Heart magnified by technique. Tracheostomy, left jugular line, and 2 catheter left chest tubes remain. Quite advanced infiltration in the lungs particularly in the left lower lobe again noted.  There is a persistent rather small left apical pneumothorax. Chest is similar to May 10 noted above.  s/p surgical placement of gastrostomy tube 4/23/2021.   abd xray 5/10/21 with ileus noted   dressing changed daily for seroma  tube feeding on hold  id f/u   No need for further antibiotics as patient completed course of Meropenem  leukocytosis resolved  speutum cx with Enterobacter aerogenes (Carbapenem Resistant) noted above   tmx 102.3   ua positive    andrea changed  Started on Vanc/Zosyn empirically   f/u blood and ucx  lispro ss   prognosis poor   s/p 4 units prbc for anemia  f/u h/h    transfuse prbc as needed  check vitamin b12  heme onc f/u  retic is elevated.    Haptoglobin normal  ? daily phlebotomy  no hemolysis, Fe/B12/folate adequate  hemolysis is unlikely even his baseline haptoglobin may be very elevated due to COVID  direct pamella neg noted    COVID is known to cause cold agglutinin  cont current meds  mgmt as per icu          Patient is a 55y old  Male who presents with a chief complaint of SOB (13 May 2021 17:51)    pt seen in icu [ x ], reg med floor [   ], bed [ x ], chair at bedside [   ], awake and responsive [ x ], mildly sedated, [  ],    nad [x  ]        Allergies    No Known Allergies        Vitals    T(F): 96.3 (05-14-21 @ 04:31), Max: 100.2 (05-13-21 @ 07:30)  HR: 109 (05-14-21 @ 06:00) (95 - 152)  BP: 114/64 (05-14-21 @ 06:00) (88/53 - 157/81)  RR: 9 (05-14-21 @ 06:00) (0 - 39)  SpO2: 97% (05-14-21 @ 06:00) (95% - 100%)  Wt(kg): --  CAPILLARY BLOOD GLUCOSE      POCT Blood Glucose.: 123 mg/dL (13 May 2021 23:07)      Labs                          8.3    7.62  )-----------( 236      ( 14 May 2021 05:28 )             25.3       05-14    142  |  104  |  29<H>  ----------------------------<  98  4.2   |  40<H>  |  0.35<L>    Ca    8.8      14 May 2021 05:28  Phos  2.8     05-14  Mg     2.2     05-14    TPro  6.4  /  Alb  2.8<L>  /  TBili  0.9  /  DBili  x   /  AST  29  /  ALT  58  /  AlkPhos  75  05-14      CARDIAC MARKERS ( 12 May 2021 22:49 )  x     / x     / 21 U/L / x     / x        COVID-19 PCR (05.14.21 @ 05:28)   COVID-19 PCR: NotDetec:    .Sputum Sputum  04-16 @ 04:42   Moderate Enterobacter aerogenes (Carbapenem Resistant)  Normal Respiratory Monserrat present  --  Enterobacter aerogenes (Carbapenem Resistant)      .Urine Clean Catch (Midstream)  03-15 @ 00:52   No growth  --  --          Radiology Results    < from: CT Chest No Cont (05.13.21 @ 15:58) >  PROCEDURE:  CT of the Chest, Abdomen and Pelvis was performed.  Sagittal and coronal reformats were performed.    FINDINGS:  CHEST:  LUNGS AND LARGE AIRWAYS: A tracheostomy tube is noted. The tip is located superior to the lev.Diffuse interstitial infiltrates throughout the right lung. Similar diffuse infiltrates throughout the left lung. Consolidation and atelectasis in the left lower lobe.  PLEURA: No evidence of pleural effusion. There is a small left pneumothorax. A left pigtail chest tube enters via the anterior chest wall within the pleural space. A second chest tube is noted entering laterally at the left lung base.  VESSELS: Left IJ central line with the tip in the left innominate vein. Vessels otherwise demonstrate a normal noncontrast appearance.  HEART: Heart size is normal. No pericardial effusion.  MEDIASTINUM AND JENNIFER: No lymphadenopathy.  CHEST WALL AND LOWER NECK: Within normal limits.    ABDOMEN AND PELVIS:  LIVER: Within normal limits.  BILE DUCTS: Normal caliber.  GALLBLADDER: Cholecystectomy.  SPLEEN: Within normal limits.  PANCREAS: Within normal limits.  ADRENALS: Within normal limits.  KIDNEYS/URETERS: The kidneys reveal a normal unenhanced morphology without evidence of stone or hydronephrosis.    BLADDER: Andrea catheter.  REPRODUCTIVE ORGANS: Prostate gland is not enlarged.    BOWEL: Diffuse dilatation of the colon from the cecum around to the anus. Multiple air-fluid levels noted in the colon. There is also diffuse dilatation of small bowel loops also with air-fluid levels. The stomach is not distended. Several proximal small bowel loops are not distended. A gastrostomy tube is noted in place within the stomach. Oral contrast that was administered into the stomach has progressed through the small bowel into the cecum. Findings felt to be most consistent with ileus.  PERITONEUM: No ascites.  VESSELS: Atherosclerotic changes.  RETROPERITONEUM/LYMPH NODES: No lymphadenopathy.  ABDOMINAL WALL: Postsurgical changes.  BONES: Degenerative changes.    IMPRESSION:  CT scan of the chest demonstrates diffuse bilateral infiltrates with a region of consolidation at the left lung base. Small left pneumothorax. 2 left chest tubes noted in place.    CT scan of the abdomen and pelvis demonstrates diffuse dilatation of small and large bowel loops most consistent with ileus.    < end of copied text >        Meds    MEDICATIONS  (STANDING):  ascorbic acid 1000 milliGRAM(s) Oral daily  BACItracin   Ointment 1 Application(s) Topical two times a day  bisacodyl Suppository 10 milliGRAM(s) Rectal daily  chlorhexidine 0.12% Liquid 15 milliLiter(s) Oral Mucosa every 12 hours  chlorhexidine 2% Cloths 1 Application(s) Topical <User Schedule>  clonazePAM  Tablet 3 milliGRAM(s) Oral every 8 hours  cloNIDine 0.2 milliGRAM(s) Oral every 8 hours  dexMEDEtomidine Infusion 1.5 MICROgram(s)/kG/Hr (31.5 mL/Hr) IV Continuous <Continuous>  doxazosin 1 milliGRAM(s) Oral at bedtime  enoxaparin Injectable 40 milliGRAM(s) SubCutaneous every 24 hours  insulin lispro (ADMELOG) corrective regimen sliding scale   SubCutaneous every 6 hours  methadone    Tablet 30 milliGRAM(s) Oral every 8 hours  metoclopramide   Syrup 10 milliGRAM(s) Oral every 6 hours  pantoprazole   Suspension 40 milliGRAM(s) Oral daily  polyethylene glycol 3350 17 Gram(s) Oral two times a day  predniSONE   Tablet 30 milliGRAM(s) Oral daily  QUEtiapine 75 milliGRAM(s) Oral two times a day  trimethoprim  160 mG/sulfamethoxazole 800 mG 1 Tablet(s) Oral <User Schedule>      MEDICATIONS  (PRN):  acetaminophen    Suspension .. 650 milliGRAM(s) Oral every 12 hours PRN Temp greater or equal to 38C (100.4F)  ALBUTerol    90 MICROgram(s) HFA Inhaler 2 Puff(s) Inhalation every 6 hours PRN Shortness of Breath and/or Wheezing  artificial  tears Solution 1 Drop(s) Both EYES every 4 hours PRN Dry Eyes  fentaNYL    Injectable 50 MICROGram(s) IV Push every 6 hours PRN Severe Pain (7 - 10)  LORazepam   Injectable 2 milliGRAM(s) IV Push every 6 hours PRN Agitation  sodium chloride 0.9% lock flush 10 milliLiter(s) IV Push every 1 hour PRN Pre/post blood products, medications, blood draw, and to maintain line patency      Physical Exam    Neuro :  no focal deficits  Respiratory: CTA B/L  CV: RRR, S1S2, no murmurs,   Abdominal: Soft, NT, ND +BS, g- tube in place, dressing cdi  Extremities: b/l ue edema, + peripheral pulses      ASSESSMENT    Hypoxemia 2nd to covid pna   transaminitis  prediabetes  h/o appendectomy  cholecystectomy        PLAN    cont cont precautions  covid 5/14/21 neg noted above  d/c remdesevir given covid ab positive noted   completed dexamethasone   started pulse steroids for 3 days - 250mg solumedrol bid now tapered off 4/14/21   C/w prednisone 30 daily    cont on bactrim empirically, TIW   cont asa, vit c,    cont albuterol inhaler   pulm f/u  procalcitonin, D-dimer, crp, ldh, ferritin, lactate noted ,    cont tylenol prn,   cont robitussin prn   pt on precedex drip    pt on fentanyl patch  pt on phenylephrine drip   cont midodrine    s/p intubation 3/29/21   s/p tracheostomy 4/21/21  O2 sat  97% (95% - 100%) mv 40%  O2 via mech vent and taper fio2 as tolerated   vent mgmt as per icu  xray 3/19/21 with pneumomediastinum  rept cxr with New trace right apical pneumothorax. New mild left apical pneumothorax. Grossly stable small pneumomediastinum.  Soft tissue emphysema at the neck bases bilaterally. Grossly stable bilateral pulmonary infiltrates noted.   cxr 2/24 with No evidence of pneumothorax can be appreciated on the available image. This may be related to patient positioning. Evidence of pneumomediastinum and subcutaneous emphysema in the lower neck is again noted. There are patchy bibasilar infiltrates and elevated right hemidiaphragm noted.   cxr 3/29/21 with No significant change bilateral infiltrates. There is a small simple left apical pneumothorax. No significant pleural effusion. Bilateral subcutaneous emphysema similar to prior.   XRs on 7AM and 5PM with no obvious increase of ptx  cxr 4/4/21 with Improving bilateral airspace disease noted    cxr 4/8/21 with Small left pneumothorax noted.  thoracic surg f/u   s/p L chest tube replacement 4/18/21 for recurrence of left pneumothorax   cxr 4/20/21 with Unchanged advanced infiltrates and catheter left chest tube noted   CXR 4/11/21 with : No interval change compared to one day prior. No pneumothorax noted.   unable to flush or aspirate tube fully, noted debris in tubing which was milked out.   Once material removed from tubing able to aspirated and flush fully. Also changed dressing on pigtail which appears to be in good position.   Monitor O2 status   s/p tracheostomy 4/21/21   stay sutures out 2 weeks from OR date  cxr 4/21/21 with No evidence of active chest disease. Tracheostomy tube in place otherwise no significant change noted   cxr 4/25/21 with A 40% LEFT pneumothorax. LEFT multi-sidehole pigtail catheter overlies LEFT lower hemithorax.. Bilateral multifocal and diffuse ill-defined airspace opacities..  Follow-up AP portable chest radiograph 4/25/2021 AT 8:58 AM: Residual LEFT 30% upper zone pneumothorax. Otherwise no interval change.noted    cxr 4/30/21 Tracheostomy tube again noted. Left chest tube noted with small left apical pneumothorax unchanged. Bilateral airspace opacities overall worsening. Heart size cannot be accurately assessed in this projection noted.   cxr 5/3/21 with Large left pneumothorax noted above.   cxr 5 4/21 with No significant interval change noted above.   ct chest with Extensive chronic appearing consolidative opacities throughout the lungs, most prominent in the left lower lobe and lingula, with bronchiectasis, scarring, and volume loss. Additional scattered peripheral coarse opacities.   Mild left pneumothorax. Left lateral pleural space pigtail catheter, not in continuity with the pneumothorax. Mild bilateral hydronephrosis, similar to appearance on CT of the abdomen and pelvis on April 27. noted above   s/p 2nd chest tube 5/5/21  cxr 5/6/21 with Tracheostomy and catheter left chest tubes remain. , There are significant diffuse advanced infiltrates again noted. No pneumothorax. Above findings are similar to study earlier in the day. Present film shows a left jugular line inserted   with tip entering the superior vena cava noted   cxr 5/11/21 with Heart magnified by technique. Tracheostomy, left jugular line, and 2 catheter left chest tubes remain. Quite advanced infiltration in the lungs particularly in the left lower lobe again noted.  There is a persistent rather small left apical pneumothorax. Chest is similar to May 10 noted    s/p surgical placement of gastrostomy tube 4/23/2021.   abd xray 5/10/21 with ileus noted   dressing changed daily for seroma  tube feeding on hold   gastrostomy put to suction   CT scan of the chest 5/13/21 demonstrates diffuse bilateral infiltrates with a region of consolidation at the left lung base. Small left pneumothorax. 2 left chest tubes noted in place noted above.  CT scan of the abdomen and pelvis 5/13/21 demonstrates diffuse dilatation of small and large bowel loops most consistent with ileus noted above.  id f/u   No need for further antibiotics as patient completed course of Meropenem  leukocytosis resolved  sputum cx with Enterobacter aerogenes (Carbapenem Resistant) noted above   tmx 100.2   ua positive    andrea changed  Completed ABx Zosyn, meropenem, s/p 1 dose of Vancomycin  lispro ss   prognosis poor   s/p 4 units prbc for anemia  f/u h/h    transfuse prbc as needed  check vitamin b12  heme onc f/u  retic is elevated.    Haptoglobin normal  ? daily phlebotomy  no hemolysis, Fe/B12/folate adequate  hemolysis is unlikely even his baseline haptoglobin may be very elevated due to COVID  direct pamella neg noted    COVID is known to cause cold agglutinin  cont current meds  mgmt as per icu          Patient is a 55y old  Male who presents with a chief complaint of SOB (13 May 2021 17:51)    pt seen in icu [ x ], reg med floor [   ], bed [ x ], chair at bedside [   ], awake and responsive [ x ], mildly sedated, [  ],    nad [x  ]        Allergies    No Known Allergies        Vitals    T(F): 96.3 (05-14-21 @ 04:31), Max: 100.2 (05-13-21 @ 07:30)  HR: 109 (05-14-21 @ 06:00) (95 - 152)  BP: 114/64 (05-14-21 @ 06:00) (88/53 - 157/81)  RR: 9 (05-14-21 @ 06:00) (0 - 39)  SpO2: 97% (05-14-21 @ 06:00) (95% - 100%)  Wt(kg): --  CAPILLARY BLOOD GLUCOSE      POCT Blood Glucose.: 123 mg/dL (13 May 2021 23:07)      Labs                          8.3    7.62  )-----------( 236      ( 14 May 2021 05:28 )             25.3       05-14    142  |  104  |  29<H>  ----------------------------<  98  4.2   |  40<H>  |  0.35<L>    Ca    8.8      14 May 2021 05:28  Phos  2.8     05-14  Mg     2.2     05-14    TPro  6.4  /  Alb  2.8<L>  /  TBili  0.9  /  DBili  x   /  AST  29  /  ALT  58  /  AlkPhos  75  05-14      CARDIAC MARKERS ( 12 May 2021 22:49 )  x     / x     / 21 U/L / x     / x        COVID-19 PCR (05.14.21 @ 05:28)   COVID-19 PCR: NotDetec:    .Sputum Sputum  04-16 @ 04:42   Moderate Enterobacter aerogenes (Carbapenem Resistant)  Normal Respiratory Monserrat present  --  Enterobacter aerogenes (Carbapenem Resistant)      .Urine Clean Catch (Midstream)  03-15 @ 00:52   No growth  --  --          Radiology Results    < from: CT Chest No Cont (05.13.21 @ 15:58) >  PROCEDURE:  CT of the Chest, Abdomen and Pelvis was performed.  Sagittal and coronal reformats were performed.    FINDINGS:  CHEST:  LUNGS AND LARGE AIRWAYS: A tracheostomy tube is noted. The tip is located superior to the lev.Diffuse interstitial infiltrates throughout the right lung. Similar diffuse infiltrates throughout the left lung. Consolidation and atelectasis in the left lower lobe.  PLEURA: No evidence of pleural effusion. There is a small left pneumothorax. A left pigtail chest tube enters via the anterior chest wall within the pleural space. A second chest tube is noted entering laterally at the left lung base.  VESSELS: Left IJ central line with the tip in the left innominate vein. Vessels otherwise demonstrate a normal noncontrast appearance.  HEART: Heart size is normal. No pericardial effusion.  MEDIASTINUM AND JENNIFER: No lymphadenopathy.  CHEST WALL AND LOWER NECK: Within normal limits.    ABDOMEN AND PELVIS:  LIVER: Within normal limits.  BILE DUCTS: Normal caliber.  GALLBLADDER: Cholecystectomy.  SPLEEN: Within normal limits.  PANCREAS: Within normal limits.  ADRENALS: Within normal limits.  KIDNEYS/URETERS: The kidneys reveal a normal unenhanced morphology without evidence of stone or hydronephrosis.    BLADDER: Andrea catheter.  REPRODUCTIVE ORGANS: Prostate gland is not enlarged.    BOWEL: Diffuse dilatation of the colon from the cecum around to the anus. Multiple air-fluid levels noted in the colon. There is also diffuse dilatation of small bowel loops also with air-fluid levels. The stomach is not distended. Several proximal small bowel loops are not distended. A gastrostomy tube is noted in place within the stomach. Oral contrast that was administered into the stomach has progressed through the small bowel into the cecum. Findings felt to be most consistent with ileus.  PERITONEUM: No ascites.  VESSELS: Atherosclerotic changes.  RETROPERITONEUM/LYMPH NODES: No lymphadenopathy.  ABDOMINAL WALL: Postsurgical changes.  BONES: Degenerative changes.    IMPRESSION:  CT scan of the chest demonstrates diffuse bilateral infiltrates with a region of consolidation at the left lung base. Small left pneumothorax. 2 left chest tubes noted in place.    CT scan of the abdomen and pelvis demonstrates diffuse dilatation of small and large bowel loops most consistent with ileus.    < end of copied text >        Meds    MEDICATIONS  (STANDING):  ascorbic acid 1000 milliGRAM(s) Oral daily  BACItracin   Ointment 1 Application(s) Topical two times a day  bisacodyl Suppository 10 milliGRAM(s) Rectal daily  chlorhexidine 0.12% Liquid 15 milliLiter(s) Oral Mucosa every 12 hours  chlorhexidine 2% Cloths 1 Application(s) Topical <User Schedule>  clonazePAM  Tablet 3 milliGRAM(s) Oral every 8 hours  cloNIDine 0.2 milliGRAM(s) Oral every 8 hours  dexMEDEtomidine Infusion 1.5 MICROgram(s)/kG/Hr (31.5 mL/Hr) IV Continuous <Continuous>  doxazosin 1 milliGRAM(s) Oral at bedtime  enoxaparin Injectable 40 milliGRAM(s) SubCutaneous every 24 hours  insulin lispro (ADMELOG) corrective regimen sliding scale   SubCutaneous every 6 hours  methadone    Tablet 30 milliGRAM(s) Oral every 8 hours  metoclopramide   Syrup 10 milliGRAM(s) Oral every 6 hours  pantoprazole   Suspension 40 milliGRAM(s) Oral daily  polyethylene glycol 3350 17 Gram(s) Oral two times a day  predniSONE   Tablet 30 milliGRAM(s) Oral daily  QUEtiapine 75 milliGRAM(s) Oral two times a day  trimethoprim  160 mG/sulfamethoxazole 800 mG 1 Tablet(s) Oral <User Schedule>      MEDICATIONS  (PRN):  acetaminophen    Suspension .. 650 milliGRAM(s) Oral every 12 hours PRN Temp greater or equal to 38C (100.4F)  ALBUTerol    90 MICROgram(s) HFA Inhaler 2 Puff(s) Inhalation every 6 hours PRN Shortness of Breath and/or Wheezing  artificial  tears Solution 1 Drop(s) Both EYES every 4 hours PRN Dry Eyes  fentaNYL    Injectable 50 MICROGram(s) IV Push every 6 hours PRN Severe Pain (7 - 10)  LORazepam   Injectable 2 milliGRAM(s) IV Push every 6 hours PRN Agitation  sodium chloride 0.9% lock flush 10 milliLiter(s) IV Push every 1 hour PRN Pre/post blood products, medications, blood draw, and to maintain line patency      Physical Exam    Neuro :  no focal deficits  Respiratory: CTA B/L  CV: RRR, S1S2, no murmurs,   Abdominal: Soft, NT, ND +BS, g- tube in place, dressing cdi  Extremities: b/l ue edema, + peripheral pulses      ASSESSMENT    Hypoxemia 2nd to covid pna   transaminitis  prediabetes  h/o appendectomy  cholecystectomy        PLAN    cont cont precautions  covid 5/14/21 neg noted above  d/c remdesevir given covid ab positive noted   completed dexamethasone   started pulse steroids for 3 days - 250mg solumedrol bid now tapered off 4/14/21   C/w prednisone 30 daily    cont on bactrim empirically, TIW   cont asa, vit c,    cont albuterol inhaler   pulm f/u  procalcitonin, D-dimer, crp, ldh, ferritin, lactate noted ,    cont tylenol prn,   cont robitussin prn   pt on precedex drip    pt methadone 30 q8   pain mgmt eval noted   Pt may have had benzo withdrawal as well as opioid withdrawal causing those symptoms.    Would dc fentanyl and give hydromorphone po as prn breakthrough.  s/p intubation 3/29/21   s/p tracheostomy 4/21/21  O2 sat  97% (95% - 100%) mv 40%  O2 via mech vent and taper fio2 as tolerated   vent mgmt as per icu  xray 3/19/21 with pneumomediastinum  rept cxr with New trace right apical pneumothorax. New mild left apical pneumothorax. Grossly stable small pneumomediastinum.  Soft tissue emphysema at the neck bases bilaterally. Grossly stable bilateral pulmonary infiltrates noted.   cxr 2/24 with No evidence of pneumothorax can be appreciated on the available image. This may be related to patient positioning. Evidence of pneumomediastinum and subcutaneous emphysema in the lower neck is again noted. There are patchy bibasilar infiltrates and elevated right hemidiaphragm noted.   cxr 3/29/21 with No significant change bilateral infiltrates. There is a small simple left apical pneumothorax. No significant pleural effusion. Bilateral subcutaneous emphysema similar to prior.   XRs on 7AM and 5PM with no obvious increase of ptx  cxr 4/4/21 with Improving bilateral airspace disease noted    cxr 4/8/21 with Small left pneumothorax noted.  thoracic surg f/u   s/p L chest tube replacement 4/18/21 for recurrence of left pneumothorax   cxr 4/20/21 with Unchanged advanced infiltrates and catheter left chest tube noted   CXR 4/11/21 with : No interval change compared to one day prior. No pneumothorax noted.   unable to flush or aspirate tube fully, noted debris in tubing which was milked out.   Once material removed from tubing able to aspirated and flush fully. Also changed dressing on pigtail which appears to be in good position.   Monitor O2 status   s/p tracheostomy 4/21/21   stay sutures out 2 weeks from OR date  cxr 4/21/21 with No evidence of active chest disease. Tracheostomy tube in place otherwise no significant change noted   cxr 4/25/21 with A 40% LEFT pneumothorax. LEFT multi-sidehole pigtail catheter overlies LEFT lower hemithorax.. Bilateral multifocal and diffuse ill-defined airspace opacities..  Follow-up AP portable chest radiograph 4/25/2021 AT 8:58 AM: Residual LEFT 30% upper zone pneumothorax. Otherwise no interval change.noted    cxr 4/30/21 Tracheostomy tube again noted. Left chest tube noted with small left apical pneumothorax unchanged. Bilateral airspace opacities overall worsening. Heart size cannot be accurately assessed in this projection noted.   cxr 5/3/21 with Large left pneumothorax noted above.   cxr 5 4/21 with No significant interval change noted above.   ct chest with Extensive chronic appearing consolidative opacities throughout the lungs, most prominent in the left lower lobe and lingula, with bronchiectasis, scarring, and volume loss. Additional scattered peripheral coarse opacities.   Mild left pneumothorax. Left lateral pleural space pigtail catheter, not in continuity with the pneumothorax. Mild bilateral hydronephrosis, similar to appearance on CT of the abdomen and pelvis on April 27. noted above   s/p 2nd chest tube 5/5/21  cxr 5/6/21 with Tracheostomy and catheter left chest tubes remain. , There are significant diffuse advanced infiltrates again noted. No pneumothorax. Above findings are similar to study earlier in the day. Present film shows a left jugular line inserted   with tip entering the superior vena cava noted   cxr 5/11/21 with Heart magnified by technique. Tracheostomy, left jugular line, and 2 catheter left chest tubes remain. Quite advanced infiltration in the lungs particularly in the left lower lobe again noted.  There is a persistent rather small left apical pneumothorax. Chest is similar to May 10 noted    s/p surgical placement of gastrostomy tube 4/23/2021.   abd xray 5/10/21 with ileus noted   dressing changed daily for seroma  tube feeding on hold   gastrostomy put to suction   CT scan of the chest 5/13/21 demonstrates diffuse bilateral infiltrates with a region of consolidation at the left lung base. Small left pneumothorax. 2 left chest tubes noted in place noted above.  CT scan of the abdomen and pelvis 5/13/21 demonstrates diffuse dilatation of small and large bowel loops most consistent with ileus noted above.  id f/u   No need for further antibiotics as patient completed course of Meropenem  leukocytosis resolved  sputum cx with Enterobacter aerogenes (Carbapenem Resistant) noted above   tmx 100.2   ua positive    andrea changed  Completed ABx Zosyn, meropenem, s/p 1 dose of Vancomycin  lispro ss   prognosis poor   s/p 4 units prbc for anemia  f/u h/h    transfuse prbc as needed  check vitamin b12  heme onc f/u  retic is elevated.    Haptoglobin normal  ? daily phlebotomy  no hemolysis, Fe/B12/folate adequate  hemolysis is unlikely even his baseline haptoglobin may be very elevated due to COVID  direct pamella neg noted    COVID is known to cause cold agglutinin   cont current meds  mgmt as per icu

## 2021-05-14 NOTE — PROGRESS NOTE ADULT - SUBJECTIVE AND OBJECTIVE BOX
Patient is a 55y old  Male who presents with a chief complaint of SOB (14 May 2021 10:04)  Awake, not alert, comfortable in bed in NAD. Still on ventilator via trach    INTERVAL HPI/OVERNIGHT EVENTS:      VITAL SIGNS:  T(F): 96.3 (21 @ 04:31)  HR: 114 (21 @ 10:00)  BP: 115/55 (21 @ 10:00)  RR: 24 (21 @ 10:00)  SpO2: 97% (21 @ 10:00)  Wt(kg): --  I&O's Detail    13 May 2021 07:01  -  14 May 2021 07:00  --------------------------------------------------------  IN:    Dexmedetomidine: 157.5 mL    Enteral Tube Flush: 870 mL    IV PiggyBack: 300 mL  Total IN: 1327.5 mL    OUT:    Chest Tube (mL): 0 mL    Chest Tube (mL): 80 mL    Indwelling Catheter - Urethral (mL): 2090 mL    Rectal Tube (mL): 200 mL  Total OUT: 2370 mL    Total NET: -1042.5 mL        Mode: AC/ CMV (Assist Control/ Continuous Mandatory Ventilation)  RR (machine): 24  TV (machine): 450  FiO2: 40  PEEP: 5  ITime: 1  MAP: 11  PIP: 34        REVIEW OF SYSTEMS:    CONSTITUTIONAL:  No fevers, chills, sweats    HEENT:  Eyes:  No diplopia or blurred vision. ENT:  No earache, sore throat or runny nose.    CARDIOVASCULAR:  No pressure, squeezing, tightness, or heaviness about the chest; no palpitations.    RESPIRATORY:  Per HPI    GASTROINTESTINAL:  No abdominal pain, nausea, vomiting or diarrhea.    GENITOURINARY:  No dysuria, frequency or urgency.    NEUROLOGIC:  No paresthesias, fasciculations, seizures or weakness.    PSYCHIATRIC:  No disorder of thought or mood.      PHYSICAL EXAM:    Constitutional: Well developed and nourished  Eyes:Perrla  ENMT: normal  Neck:supple  Respiratory: good air entry  Cardiovascular: S1 S2 regular  Gastrointestinal: Soft, Non tender  Extremities: No edema  Vascular:normal  Neurological:Awake, alert,Ox3  Musculoskeletal:Normal      MEDICATIONS  (STANDING):  ascorbic acid 1000 milliGRAM(s) Oral daily  BACItracin   Ointment 1 Application(s) Topical two times a day  bisacodyl Suppository 10 milliGRAM(s) Rectal daily  chlorhexidine 0.12% Liquid 15 milliLiter(s) Oral Mucosa every 12 hours  chlorhexidine 2% Cloths 1 Application(s) Topical <User Schedule>  clonazePAM  Tablet 3 milliGRAM(s) Oral every 8 hours  cloNIDine 0.2 milliGRAM(s) Oral every 8 hours  enoxaparin Injectable 40 milliGRAM(s) SubCutaneous every 24 hours  insulin lispro (ADMELOG) corrective regimen sliding scale   SubCutaneous every 6 hours  methadone    Tablet 10 milliGRAM(s) Oral every 8 hours  metoclopramide   Syrup 10 milliGRAM(s) Oral every 6 hours  pantoprazole   Suspension 40 milliGRAM(s) Oral daily  polyethylene glycol 3350 17 Gram(s) Oral two times a day  predniSONE   Tablet 30 milliGRAM(s) Oral daily  QUEtiapine 75 milliGRAM(s) Oral two times a day  trimethoprim  160 mG/sulfamethoxazole 800 mG 1 Tablet(s) Oral <User Schedule>    MEDICATIONS  (PRN):  acetaminophen    Suspension .. 650 milliGRAM(s) Oral every 12 hours PRN Temp greater or equal to 38C (100.4F)  ALBUTerol    90 MICROgram(s) HFA Inhaler 2 Puff(s) Inhalation every 6 hours PRN Shortness of Breath and/or Wheezing  artificial  tears Solution 1 Drop(s) Both EYES every 4 hours PRN Dry Eyes  fentaNYL    Injectable 50 MICROGram(s) IV Push every 6 hours PRN Severe Pain (7 - 10)  LORazepam   Injectable 2 milliGRAM(s) IV Push every 6 hours PRN Agitation  sodium chloride 0.9% lock flush 10 milliLiter(s) IV Push every 1 hour PRN Pre/post blood products, medications, blood draw, and to maintain line patency      Allergies    No Known Allergies    Intolerances        LABS:                        8.3    7.62  )-----------( 236      ( 14 May 2021 05:28 )             25.3     05-14    142  |  104  |  29<H>  ----------------------------<  98  4.2   |  40<H>  |  0.35<L>    Ca    8.8      14 May 2021 05:28  Phos  2.8     05-  Mg     2.2     -    TPro  6.4  /  Alb  2.8<L>  /  TBili  0.9  /  DBili  x   /  AST  29  /  ALT  58  /  AlkPhos  75  05-      Urinalysis Basic - ( 12 May 2021 22:10 )    Color: Brown / Appearance: very cloudy / S.020 / pH: x  Gluc: x / Ketone: Trace  / Bili: Negative / Urobili: 4   Blood: x / Protein: 500 mg/dL / Nitrite: Negative   Leuk Esterase: Trace / RBC: 10-25 /HPF / WBC >50 /HPF   Sq Epi: x / Non Sq Epi: Few /HPF / Bacteria: Moderate /HPF      ABG - ( 14 May 2021 09:02 )  pH, Arterial: 7.40  pH, Blood: x     /  pCO2: 71    /  pO2: 93    / HCO3: 44    / Base Excess: 15.5  /  SaO2: 99                CARDIAC MARKERS ( 12 May 2021 22:49 )  x     / x     / 21 U/L / x     / x          CAPILLARY BLOOD GLUCOSE      POCT Blood Glucose.: 123 mg/dL (13 May 2021 23:07)  POCT Blood Glucose.: 142 mg/dL (13 May 2021 18:07)  POCT Blood Glucose.: 129 mg/dL (13 May 2021 11:08)        RADIOLOGY & ADDITIONAL TESTS:    CXR:  < from: Xray Chest 1 View- PORTABLE-Routine (Xray Chest 1 View- PORTABLE-Routine in AM.) (21 @ 09:23) >  IMPRESSION:  Progression of patchy infiltrates. No pneumothorax.      < end of copied text >    Ct scan chest:    ekg;    echo:

## 2021-05-14 NOTE — PROGRESS NOTE ADULT - ASSESSMENT
Assessment and plan: 56 y/o M with no PMH is admitted to ICU for AHRF 2/2 covid19 pna     1. Acute hypoxic respiratory failure  2. ARDS 2/2 Covid pneumonia  3. Pneumothorax  4. Prediabetes  5. Abnormal TSH     Neuro  -Alert and oriented x 3 at baseline   -Off all drips overnight, however concern for opiate and precedex withdrawal   -Increase Klonopin to 3mg q8h  -Increase Seroquel 75 mg BID   -Restart methadone higher dosage to 30mg q8hr  -Ativan PRN, fentanyl PRN  -CT head unremarkable   -Restart Precedex drip with clonidine .2mg q8h to taper off drip  -Low concern for malignant hypothermia, NMS, or serotonin syndrome given waxing/waning and lack of inciting medications    Cardiovascular  # Hypotension   Off pressors  C/w midodrine to 20mg tid    # TACHYCARDIA: recurrent episodes   -HR can go to 130-150 while agitated - not likely cardiac source  -Sedation as above  -Continue monitoring      Pulm  #Acute hypoxic respiratory failure: secondary to Covid19 pneumonia  #ARDS  -Was on HFNC then got intubated 3/29  -Trach 4/22 continues on Vent   - Remdesivir was discontinued due to positive antibodies   - Finished Dexamethasone   - Covid19 PCR negative now, off isolation   - Daily SBT, difficulty tolerating 2/2 to agitation    # Bacterial Pneumonia  - Stable infiltrate on CXR ,likely developed Bacterial pneumonia   - Completed ABx Zosyn, meropenem, s/p 1 dose of Vancomycin  - MRSA negative from 3/26  - Sputum Cx growing few gram negative rods, CRE - Contact isolation  - Taper prednisone to 30 daily (5/11) for possible organizing pneumonia   - C/w bactrim empirically, TIW for PCP PPX  - Secretions from the trach, needs frequent suctions   - Pulm. Dr. Ryan  - ID Dr Anand     #Pneumothorax and pneumomediastinum:   -Thoracic surgery following  -s/p Chest tube placed 4/8 pigtail to wall suction and d/c on 4/15  -On 4/18 chest tube replaced for recurrence of PTX- c/w chest tube to suction     -anterior chest tube placed 5/6 for worsening pneumothorax with resolution of PTX  5/10 CXR with recurrence of apical pneumo - tube adjusted with improvement however still persisting but stable  -Fu repeat CT chest today    ID  Likely bacterial pneumonia   -leukocytosis stable 10k-12k  -Febrile o/n   -Tylenol PRN   -Completed ABX course  -plan as above     Fevers  Recurrent - o/n to 102.3,   UA now positive - andrea changed, CXR w/o new infiltrate  Started on Vanc/Zosyn empirically o/n - will d/c for now as no clear infectious source   Can be 2/2 to withdrawal or ileus      Nephro  -Pitting edema to both arms, ecchymosis on right AC, blisters on left arm around brachial area    -Was retaining urine, andrea was placed 5/5  -monitor I/Os   -Completed albumin  Metabolic alkalosis stable  Completed diamox   Hold Lasix given Ileus  Restart Free water    GI  Transaminitis:   likely secondary to Covid19, Resolved   hepatitis panel -ve  -continue to monitor LFT    Ileus  Abd xray with ileus  Keep NPO now, TF on hold  C/w Miralax BID standing - added dulcolax suppository and reglan   G tube 4/23, - dressing changed daily for seroma  Fu CT abd/pelvis w/ oral contrast  NGT pending results  Surgery following    Heme  Elevated d-dimer: likely secondary to Covid19   -D-dimer 423 on admission  -Last D-dimer 1434  - No active bleeding, restarted lovenox daily    Anemia  S/p 4 PRBC total  - Now Hg stable 7-9  CTH and CT abdomen w/o contrast no evidence of bleed    No active signs of bleeding (No hematemesis, melena or hematuria)  Hemolysis w/u- negative  Iron studies show AECD  Dr Andrade on board   F/u CBC daily     Endocrine  Prediabetes:  -A1c 5.8  -BS controlled  -continue HSS    Abnormal TSH:  -TSH level noted 0.26,   -Repeat TSH 0.37 and Free T4 1.82    Skin/Catheters  No rashes. Peripheral IV lines.   LIJ  Both arms with edema, right AC with ecchymosis    Prophylaxis   On Lovenox for DVT   Protonix for GI proph    GOC  FULL CODE Assessment and plan: 54 y/o M with no PMH is admitted to ICU for AHRF 2/2 covid19 pna     1. Acute hypoxic respiratory failure  2. ARDS 2/2 Covid pneumonia  3. Pneumothorax  4. Prediabetes  5. Abnormal TSH     Neuro  -Alert and oriented x 3 at baseline   -Off all drips overnight, and calm  -C/w Klonopin to 3mg q8h  -C/w Seroquel 75 mg BID   -Decrease methadone 10mg q8hr  - C/w Clonidine .2 mg q8hr  -Ativan PRN, fentanyl PRN  -CT head unremarkable   -Low concern for malignant hypothermia, NMS, or serotonin syndrome given waxing/waning and lack of inciting medications    Cardiovascular  # Hypotension   Off pressors  C/w midodrine to 20mg tid    # TACHYCARDIA: recurrent episodes   -HR can go to 130-150 while agitated - not likely cardiac source  -Sedation as above  -Continue monitoring      Pulm  #Acute hypoxic respiratory failure: secondary to Covid19 pneumonia  #ARDS  -Was on HFNC then got intubated 3/29  -Trach 4/22 continues on Vent   - Remdesivir was discontinued due to positive antibodies   - Finished Dexamethasone   - Covid19 PCR negative now, off isolation   - Daily SBT, difficulty tolerating 2/2 to agitation  - RR inc to 24 o/n for CO2 retention    # Bacterial Pneumonia  - Stable infiltrate on CXR ,likely developed Bacterial pneumonia   - Completed ABx Zosyn, meropenem, s/p 1 dose of Vancomycin  - MRSA negative from 3/26  - Sputum Cx growing few gram negative rods, CRE - Contact isolation  - Taper prednisone to 30 daily (5/11) for possible organizing pneumonia   - C/w bactrim empirically, TIW for PCP PPX  - Secretions from the trach, needs frequent suctions   - Pulm. Dr. Ryan  - ID Dr Anand     #Pneumothorax and pneumomediastinum:   -Thoracic surgery following  -s/p Chest tube placed 4/8 pigtail to wall suction and d/c on 4/15  -On 4/18 chest tube replaced for recurrence of PTX- c/w chest tube to suction     -anterior chest tube placed 5/6 for worsening pneumothorax with resolution of PTX  5/10 CXR with recurrence of apical pneumo - tube adjusted with improvement however still persisting but stable  -Repeat Chest CT shows persistent PTX  -Water seal inferior CT    ID  Likely bacterial pneumonia   -leukocytosis improved 7k  -Afebrile  -Tylenol PRN   -Completed ABX course  -plan as above     Fevers  Now afebrile  UA now positive - andrea changed, CXR w/o new infiltrate  No abx- no clear infectious source   Was likely 2/2 to withdrawal or ileus      Nephro  -Pitting edema to both arms, ecchymosis on right AC, blisters on left arm around brachial area    -Was retaining urine, andrea was placed 5/5  -DC doxazosin for borderline BPs  -monitor I/Os   -Completed albumin  Metabolic alkalosis stable  Completed diamox   Hold Lasix given Ileus  Start maintenance IVF if UO drops     GI  Transaminitis:   likely secondary to Covid19, Resolved   hepatitis panel -ve  -continue to monitor LFT    Ileus  Abd xray with ileus, CT showing the same  Keep NPO now, TF on hold  G Tube off suction now  Rectal tube in place  C/w Miralax BID standing and reglan   G tube 4/23, - dressing changed daily for seroma  Surgery following    Heme  Elevated d-dimer: likely secondary to Covid19   -D-dimer 423 on admission  -Last D-dimer 1434  - No active bleeding, restarted lovenox daily    Anemia  S/p 4 PRBC total  - Now Hg stable 7-9  CTH and CT abdomen w/o contrast no evidence of bleed    No active signs of bleeding (No hematemesis, melena or hematuria)  Hemolysis w/u- negative  Iron studies show AECD  Dr Andrade on board   F/u CBC daily     Endocrine  Prediabetes:  -A1c 5.8  -BS controlled  -continue HSS    Abnormal TSH:  -TSH level noted 0.26,   -Repeat TSH 0.37 and Free T4 1.82    Skin/Catheters  No rashes. Peripheral IV lines.   LIJ  Both arms with edema, right AC with ecchymosis    Prophylaxis   On Lovenox for DVT   Protonix for GI proph    GOC  FULL CODE

## 2021-05-14 NOTE — PROGRESS NOTE ADULT - SUBJECTIVE AND OBJECTIVE BOX
INTERVAL HPI/OVERNIGHT EVENTS: No acute overnight events. Patient off drips. Minimal G tube output.     PRESSORS: [] YES [x] NO     WHICH:    Antimicrobial:  trimethoprim  160 mG/sulfamethoxazole 800 mG 1 Tablet(s) Oral <User Schedule>    Cardiovascular:  cloNIDine 0.2 milliGRAM(s) Oral every 8 hours  doxazosin 1 milliGRAM(s) Oral at bedtime    Pulmonary:  ALBUTerol    90 MICROgram(s) HFA Inhaler 2 Puff(s) Inhalation every 6 hours PRN    Hematalogic:  enoxaparin Injectable 40 milliGRAM(s) SubCutaneous every 24 hours    Other:  acetaminophen    Suspension .. 650 milliGRAM(s) Oral every 12 hours PRN  artificial  tears Solution 1 Drop(s) Both EYES every 4 hours PRN  ascorbic acid 1000 milliGRAM(s) Oral daily  BACItracin   Ointment 1 Application(s) Topical two times a day  bisacodyl Suppository 10 milliGRAM(s) Rectal daily  chlorhexidine 0.12% Liquid 15 milliLiter(s) Oral Mucosa every 12 hours  chlorhexidine 2% Cloths 1 Application(s) Topical <User Schedule>  clonazePAM  Tablet 3 milliGRAM(s) Oral every 8 hours  dexMEDEtomidine Infusion 1.5 MICROgram(s)/kG/Hr IV Continuous <Continuous>  fentaNYL    Injectable 50 MICROGram(s) IV Push every 6 hours PRN  insulin lispro (ADMELOG) corrective regimen sliding scale   SubCutaneous every 6 hours  LORazepam   Injectable 2 milliGRAM(s) IV Push every 6 hours PRN  methadone    Tablet 30 milliGRAM(s) Oral every 8 hours  metoclopramide   Syrup 10 milliGRAM(s) Oral every 6 hours  pantoprazole   Suspension 40 milliGRAM(s) Oral daily  polyethylene glycol 3350 17 Gram(s) Oral two times a day  predniSONE   Tablet 30 milliGRAM(s) Oral daily  QUEtiapine 75 milliGRAM(s) Oral two times a day  sodium chloride 0.9% lock flush 10 milliLiter(s) IV Push every 1 hour PRN    acetaminophen    Suspension .. 650 milliGRAM(s) Oral every 12 hours PRN  ALBUTerol    90 MICROgram(s) HFA Inhaler 2 Puff(s) Inhalation every 6 hours PRN  artificial  tears Solution 1 Drop(s) Both EYES every 4 hours PRN  ascorbic acid 1000 milliGRAM(s) Oral daily  BACItracin   Ointment 1 Application(s) Topical two times a day  bisacodyl Suppository 10 milliGRAM(s) Rectal daily  chlorhexidine 0.12% Liquid 15 milliLiter(s) Oral Mucosa every 12 hours  chlorhexidine 2% Cloths 1 Application(s) Topical <User Schedule>  clonazePAM  Tablet 3 milliGRAM(s) Oral every 8 hours  cloNIDine 0.2 milliGRAM(s) Oral every 8 hours  dexMEDEtomidine Infusion 1.5 MICROgram(s)/kG/Hr IV Continuous <Continuous>  doxazosin 1 milliGRAM(s) Oral at bedtime  enoxaparin Injectable 40 milliGRAM(s) SubCutaneous every 24 hours  fentaNYL    Injectable 50 MICROGram(s) IV Push every 6 hours PRN  insulin lispro (ADMELOG) corrective regimen sliding scale   SubCutaneous every 6 hours  LORazepam   Injectable 2 milliGRAM(s) IV Push every 6 hours PRN  methadone    Tablet 30 milliGRAM(s) Oral every 8 hours  metoclopramide   Syrup 10 milliGRAM(s) Oral every 6 hours  pantoprazole   Suspension 40 milliGRAM(s) Oral daily  polyethylene glycol 3350 17 Gram(s) Oral two times a day  predniSONE   Tablet 30 milliGRAM(s) Oral daily  QUEtiapine 75 milliGRAM(s) Oral two times a day  sodium chloride 0.9% lock flush 10 milliLiter(s) IV Push every 1 hour PRN  trimethoprim  160 mG/sulfamethoxazole 800 mG 1 Tablet(s) Oral <User Schedule>    Drug Dosing Weight  Height (cm): 167.6 (2021 23:15)  Weight (kg): 83.9 (2021 23:15)  BMI (kg/m2): 29.9 (2021 23:15)  BSA (m2): 1.93 (2021 23:15)    CENTRAL LINE: [x] YES [] NO  LOCATION: [facility]   DATE INSERTED:   REMOVE: [] YES [] NO  EXPLAIN:    RIVERA: [x] YES [] NO    DATE INSERTED:   REMOVE:  [] YES [] NO  EXPLAIN: Strict IOs    A-LINE:  [] YES [x] NO  LOCATION:   DATE INSERTED:  REMOVE:  [] YES [] NO  EXPLAIN:    PMH -reviewed admission note, no change since admission  PAST MEDICAL & SURGICAL HISTORY:  No pertinent past medical history    S/P moody      S/P appendectomy  1990s        ICU Vital Signs Last 24 Hrs  T(C): 35.7 (14 May 2021 04:31), Max: 37.8 (13 May 2021 12:00)  T(F): 96.3 (14 May 2021 04:31), Max: 100 (13 May 2021 12:00)  HR: 98 (14 May 2021 07:00) (95 - 152)  BP: 85/49 (14 May 2021 07:00) (85/49 - 157/81)  BP(mean): 55 (14 May 2021 07:00) (55 - 102)  ABP: --  ABP(mean): --  RR: 24 (14 May 2021 07:00) (0 - 35)  SpO2: 97% (14 May 2021 07:00) (95% - 100%)      ABG - ( 14 May 2021 04:16 )  pH, Arterial: 7.28  pH, Blood: x     /  pCO2: 90    /  pO2: 96    / HCO3: 42    / Base Excess: 11.5  /  SaO2: 99                    05-13 @ 07:01  -  -14 @ 07:00  --------------------------------------------------------  IN: 1327.5 mL / OUT: 2370 mL / NET: -1042.5 mL        Mode: AC/ CMV (Assist Control/ Continuous Mandatory Ventilation)  RR (machine): 20  TV (machine): 450  FiO2: 40  PEEP: 5  ITime: 1  MAP: 11  PIP: 37      REVIEW OF SYSTEMS:    Unable to complete - patient trached      PHYSICAL EXAM:    GENERAL: NAD, intermittently restless, diaphoretic  HEAD:  Atraumatic, Normocephalic  EYES: EOMI, PERRL, conjunctiva and sclera clear  NECK: Supple, normal appearance, No JVD; Normal thyroid; Trachea midline + trach  NERVOUS SYSTEM:  Awake + encephalopathy  Moving all 4 extremities  CHEST/LUNx chest tube on L. No chest deformity; Crackles b/l. No, rhonchi, wheezing   HEART: Tachycardic Regular rhythm; No murmurs, rubs, or gallops  ABDOMEN: PEG in place. Soft, Nontender, Nondistended; Bowel sounds present + PEG  EXTREMITIES:  Mild dependent edema. R arm edema and ecchymosis.  Peripheral Pulses intact, No clubbing, cyanosis,   SKIN: Intact, No rashes or lesions  Rivera with dark brown urine    LABS:  CBC Full  -  ( 14 May 2021 05:28 )  WBC Count : 7.62 K/uL  RBC Count : 2.31 M/uL  Hemoglobin : 8.3 g/dL  Hematocrit : 25.3 %  Platelet Count - Automated : 236 K/uL  Mean Cell Volume : 109.5 fl  Mean Cell Hemoglobin : 35.9 pg  Mean Cell Hemoglobin Concentration : 32.8 gm/dL  Auto Neutrophil # : x  Auto Lymphocyte # : x  Auto Monocyte # : x  Auto Eosinophil # : x  Auto Basophil # : x  Auto Neutrophil % : x  Auto Lymphocyte % : x  Auto Monocyte % : x  Auto Eosinophil % : x  Auto Basophil % : x    05-    142  |  104  |  29<H>  ----------------------------<  98  4.2   |  40<H>  |  0.35<L>    Ca    8.8      14 May 2021 05:28  Phos  2.8     -  Mg     2.2     -    TPro  6.4  /  Alb  2.8<L>  /  TBili  0.9  /  DBili  x   /  AST  29  /  ALT  58  /  AlkPhos  75  -14      Urinalysis Basic - ( 12 May 2021 22:10 )    Color: Brown / Appearance: very cloudy / S.020 / pH: x  Gluc: x / Ketone: Trace  / Bili: Negative / Urobili: 4   Blood: x / Protein: 500 mg/dL / Nitrite: Negative   Leuk Esterase: Trace / RBC: 10-25 /HPF / WBC >50 /HPF   Sq Epi: x / Non Sq Epi: Few /HPF / Bacteria: Moderate /HPF          RADIOLOGY & ADDITIONAL STUDIES REVIEWED:  ***      GOALS OF CARE DISCUSSION WITH PATIENT/FAMILY/PROXY:     INTERVAL HPI/OVERNIGHT EVENTS: No acute overnight events. Patient off drips. Minimal G tube output. Will keep NPO but take G tube off suction. Will water seal chest tube. Decrease methadone to 10mg q8 hr. Monitor UO and BP - may need replacement IVF. Will DC doxazosin given soft BPs.    PRESSORS: [] YES [x] NO     WHICH:    Antimicrobial:  trimethoprim  160 mG/sulfamethoxazole 800 mG 1 Tablet(s) Oral <User Schedule>    Cardiovascular:  cloNIDine 0.2 milliGRAM(s) Oral every 8 hours  doxazosin 1 milliGRAM(s) Oral at bedtime    Pulmonary:  ALBUTerol    90 MICROgram(s) HFA Inhaler 2 Puff(s) Inhalation every 6 hours PRN    Hematalogic:  enoxaparin Injectable 40 milliGRAM(s) SubCutaneous every 24 hours    Other:  acetaminophen    Suspension .. 650 milliGRAM(s) Oral every 12 hours PRN  artificial  tears Solution 1 Drop(s) Both EYES every 4 hours PRN  ascorbic acid 1000 milliGRAM(s) Oral daily  BACItracin   Ointment 1 Application(s) Topical two times a day  bisacodyl Suppository 10 milliGRAM(s) Rectal daily  chlorhexidine 0.12% Liquid 15 milliLiter(s) Oral Mucosa every 12 hours  chlorhexidine 2% Cloths 1 Application(s) Topical <User Schedule>  clonazePAM  Tablet 3 milliGRAM(s) Oral every 8 hours  dexMEDEtomidine Infusion 1.5 MICROgram(s)/kG/Hr IV Continuous <Continuous>  fentaNYL    Injectable 50 MICROGram(s) IV Push every 6 hours PRN  insulin lispro (ADMELOG) corrective regimen sliding scale   SubCutaneous every 6 hours  LORazepam   Injectable 2 milliGRAM(s) IV Push every 6 hours PRN  methadone    Tablet 30 milliGRAM(s) Oral every 8 hours  metoclopramide   Syrup 10 milliGRAM(s) Oral every 6 hours  pantoprazole   Suspension 40 milliGRAM(s) Oral daily  polyethylene glycol 3350 17 Gram(s) Oral two times a day  predniSONE   Tablet 30 milliGRAM(s) Oral daily  QUEtiapine 75 milliGRAM(s) Oral two times a day  sodium chloride 0.9% lock flush 10 milliLiter(s) IV Push every 1 hour PRN    acetaminophen    Suspension .. 650 milliGRAM(s) Oral every 12 hours PRN  ALBUTerol    90 MICROgram(s) HFA Inhaler 2 Puff(s) Inhalation every 6 hours PRN  artificial  tears Solution 1 Drop(s) Both EYES every 4 hours PRN  ascorbic acid 1000 milliGRAM(s) Oral daily  BACItracin   Ointment 1 Application(s) Topical two times a day  bisacodyl Suppository 10 milliGRAM(s) Rectal daily  chlorhexidine 0.12% Liquid 15 milliLiter(s) Oral Mucosa every 12 hours  chlorhexidine 2% Cloths 1 Application(s) Topical <User Schedule>  clonazePAM  Tablet 3 milliGRAM(s) Oral every 8 hours  cloNIDine 0.2 milliGRAM(s) Oral every 8 hours  dexMEDEtomidine Infusion 1.5 MICROgram(s)/kG/Hr IV Continuous <Continuous>  doxazosin 1 milliGRAM(s) Oral at bedtime  enoxaparin Injectable 40 milliGRAM(s) SubCutaneous every 24 hours  fentaNYL    Injectable 50 MICROGram(s) IV Push every 6 hours PRN  insulin lispro (ADMELOG) corrective regimen sliding scale   SubCutaneous every 6 hours  LORazepam   Injectable 2 milliGRAM(s) IV Push every 6 hours PRN  methadone    Tablet 30 milliGRAM(s) Oral every 8 hours  metoclopramide   Syrup 10 milliGRAM(s) Oral every 6 hours  pantoprazole   Suspension 40 milliGRAM(s) Oral daily  polyethylene glycol 3350 17 Gram(s) Oral two times a day  predniSONE   Tablet 30 milliGRAM(s) Oral daily  QUEtiapine 75 milliGRAM(s) Oral two times a day  sodium chloride 0.9% lock flush 10 milliLiter(s) IV Push every 1 hour PRN  trimethoprim  160 mG/sulfamethoxazole 800 mG 1 Tablet(s) Oral <User Schedule>    Drug Dosing Weight  Height (cm): 167.6 (2021 23:15)  Weight (kg): 83.9 (2021 23:15)  BMI (kg/m2): 29.9 (2021 23:15)  BSA (m2): 1.93 (2021 23:15)    CENTRAL LINE: [x] YES [] NO  LOCATION: [facility]   DATE INSERTED:   REMOVE: [] YES [] NO  EXPLAIN:    RIVERA: [x] YES [] NO    DATE INSERTED:   REMOVE:  [] YES [] NO  EXPLAIN: Strict IOs    A-LINE:  [] YES [x] NO  LOCATION:   DATE INSERTED:  REMOVE:  [] YES [] NO  EXPLAIN:    PMH -reviewed admission note, no change since admission  PAST MEDICAL & SURGICAL HISTORY:  No pertinent past medical history    S/P moody  1990s    S/P appendectomy  1990s        ICU Vital Signs Last 24 Hrs  T(C): 35.7 (14 May 2021 04:31), Max: 37.8 (13 May 2021 12:00)  T(F): 96.3 (14 May 2021 04:31), Max: 100 (13 May 2021 12:00)  HR: 98 (14 May 2021 07:00) (95 - 152)  BP: 85/49 (14 May 2021 07:00) (85/49 - 157/81)  BP(mean): 55 (14 May 2021 07:00) (55 - 102)  ABP: --  ABP(mean): --  RR: 24 (14 May 2021 07:00) (0 - 35)  SpO2: 97% (14 May 2021 07:00) (95% - 100%)      ABG - ( 14 May 2021 04:16 )  pH, Arterial: 7.28  pH, Blood: x     /  pCO2: 90    /  pO2: 96    / HCO3: 42    / Base Excess: 11.5  /  SaO2: 99                    05-13 @ 07:01  -  05-14 @ 07:00  --------------------------------------------------------  IN: 1327.5 mL / OUT: 2370 mL / NET: -1042.5 mL        Mode: AC/ CMV (Assist Control/ Continuous Mandatory Ventilation)  RR (machine): 20  TV (machine): 450  FiO2: 40  PEEP: 5  ITime: 1  MAP: 11  PIP: 37      REVIEW OF SYSTEMS:    Unable to complete - patient trached      PHYSICAL EXAM:    GENERAL: NAD, intermittently restless, diaphoretic  HEAD:  Atraumatic, Normocephalic  EYES: EOMI, PERRL, conjunctiva and sclera clear  NECK: Supple, normal appearance, No JVD; Normal thyroid; Trachea midline + trach  NERVOUS SYSTEM:  Awake + encephalopathy  Moving all 4 extremities  CHEST/LUNx chest tube on L. No chest deformity; Crackles b/l. No, rhonchi, wheezing   HEART: Tachycardic Regular rhythm; No murmurs, rubs, or gallops  ABDOMEN: PEG in place. Soft, Nontender, Nondistended; Bowel sounds present + PEG  EXTREMITIES:  Mild dependent edema. R arm edema and ecchymosis.  Peripheral Pulses intact, No clubbing, cyanosis,   SKIN: Intact, No rashes or lesions  Rivera with dark brown urine    LABS:  CBC Full  -  ( 14 May 2021 05:28 )  WBC Count : 7.62 K/uL  RBC Count : 2.31 M/uL  Hemoglobin : 8.3 g/dL  Hematocrit : 25.3 %  Platelet Count - Automated : 236 K/uL  Mean Cell Volume : 109.5 fl  Mean Cell Hemoglobin : 35.9 pg  Mean Cell Hemoglobin Concentration : 32.8 gm/dL  Auto Neutrophil # : x  Auto Lymphocyte # : x  Auto Monocyte # : x  Auto Eosinophil # : x  Auto Basophil # : x  Auto Neutrophil % : x  Auto Lymphocyte % : x  Auto Monocyte % : x  Auto Eosinophil % : x  Auto Basophil % : x    -    142  |  104  |  29<H>  ----------------------------<  98  4.2   |  40<H>  |  0.35<L>    Ca    8.8      14 May 2021 05:28  Phos  2.8     05-  Mg     2.2     -    TPro  6.4  /  Alb  2.8<L>  /  TBili  0.9  /  DBili  x   /  AST  29  /  ALT  58  /  AlkPhos  75  05-      Urinalysis Basic - ( 12 May 2021 22:10 )    Color: Brown / Appearance: very cloudy / S.020 / pH: x  Gluc: x / Ketone: Trace  / Bili: Negative / Urobili: 4   Blood: x / Protein: 500 mg/dL / Nitrite: Negative   Leuk Esterase: Trace / RBC: 10-25 /HPF / WBC >50 /HPF   Sq Epi: x / Non Sq Epi: Few /HPF / Bacteria: Moderate /HPF          RADIOLOGY & ADDITIONAL STUDIES REVIEWED:  ***      GOALS OF CARE DISCUSSION WITH PATIENT/FAMILY/PROXY:

## 2021-05-14 NOTE — PROGRESS NOTE ADULT - ASSESSMENT
54 yo M s/p left anterior pigtail chest tube 5/6 and left pigtail chest tube 4/18. S/p Tracheostomy 4/21, s/p G-tube placement 4/23, ileus     -Daily dressing changes   -NPO  -Hold tube feeds   -IVF  -Continue care as per ICU team

## 2021-05-14 NOTE — PROGRESS NOTE ADULT - ATTENDING COMMENTS
55 yr old  man , non smoker with  moody 1990s presented 3/14 with x9 days worsening cough, subjective fevers, and SOB, with x2-3 days dysuria and central, non-radiating, constant CP. Admitted to medicine unit  for acute hypoxic respiratory failure secondary to pna from covid-19 infection .     Assessment:  1. Acute hypoxic respiratory failure  2. Covid-19 infection   3. Transaminitis  4. Prediabetes  5. Bilateral pneumothorax  6. Septic shock - resolved  7. Anemia  8. bowel obstruction/ileus    Plan     - persistent left apical PTX  -S/p tracheostomy placement 4/21   -S/p G-tube 4/23  - REGGIE improved, holding tubes for bowel rest, cont bowel regimen  -Cont. mechanical ventilation   -Daily weaning trials  - apical chest tube to suction, base CT on waterseal  - tapering steroids  - wean sedation  -PT evaluation  -dvt/gi prophy  -hemodynamic monitoring   -Prognosis is guarded .

## 2021-05-14 NOTE — PROGRESS NOTE ADULT - SUBJECTIVE AND OBJECTIVE BOX
INTERVAL HPI/OVERNIGHT EVENTS:    Pt seen and examined at bedside. No acute events overnight.      Vital Signs Last 24 Hrs  T(C): 35.7 (14 May 2021 04:31), Max: 37.8 (13 May 2021 12:00)  T(F): 96.3 (14 May 2021 04:31), Max: 100 (13 May 2021 12:00)  HR: 114 (14 May 2021 10:00) (85 - 148)  BP: 115/55 (14 May 2021 10:00) (85/49 - 157/81)  BP(mean): 69 (14 May 2021 10:00) (55 - 95)  RR: 24 (14 May 2021 10:00) (0 - 27)  SpO2: 97% (14 May 2021 10:00) (95% - 100%)  I&O's Detail    13 May 2021 07:01  -  14 May 2021 07:00  --------------------------------------------------------  IN:    Dexmedetomidine: 157.5 mL    Enteral Tube Flush: 870 mL    IV PiggyBack: 300 mL  Total IN: 1327.5 mL    OUT:    Chest Tube (mL): 0 mL    Chest Tube (mL): 80 mL    Indwelling Catheter - Urethral (mL): 2090 mL    Rectal Tube (mL): 200 mL  Total OUT: 2370 mL    Total NET: -1042.5 mL        bisacodyl Suppository 10 milliGRAM(s) Rectal daily  metoclopramide   Syrup 10 milliGRAM(s) Oral every 6 hours  pantoprazole   Suspension 40 milliGRAM(s) Oral daily  polyethylene glycol 3350 17 Gram(s) Oral two times a day  trimethoprim  160 mG/sulfamethoxazole 800 mG 1 Tablet(s) Oral <User Schedule>      Physical Exam  General: No acute distress  Pulm: Trach, ventilated, ant pigtail no airleak; post CT no airleak, to suction  Abdomen: soft, nondistended, superior aspect of the wound open and packed w/ wet to dry, no rebound tenderness, no guarding, no palpable masses, G tube in place          Labs:                        8.3    7.62  )-----------( 236      ( 14 May 2021 05:28 )             25.3     05-14    142  |  104  |  29<H>  ----------------------------<  98  4.2   |  40<H>  |  0.35<L>    Ca    8.8      14 May 2021 05:28  Phos  2.8     05-14  Mg     2.2     05-14    TPro  6.4  /  Alb  2.8<L>  /  TBili  0.9  /  DBili  x   /  AST  29  /  ALT  58  /  AlkPhos  75  05-14        RADIOLOGY & ADDITIONAL STUDIES:  < from: CT Abdomen and Pelvis w/ Oral Cont (05.13.21 @ 16:02) >  FINDINGS:  CHEST:  LUNGS AND LARGE AIRWAYS: A tracheostomy tube is noted. The tip is located superior to the lev.Diffuse interstitial infiltrates throughout the right lung. Similar diffuse infiltrates throughout the left lung. Consolidation and atelectasis in the left lower lobe.  PLEURA: No evidence of pleural effusion. There is a small left pneumothorax. A left pigtail chest tube enters via the anterior chest wall within the pleural space. A second chest tube is noted entering laterally at the left lung base.  VESSELS: Left IJ central line with the tip in the left innominate vein. Vessels otherwise demonstrate a normal noncontrast appearance.  HEART: Heart size is normal. No pericardial effusion.  MEDIASTINUM AND JENNIFER: No lymphadenopathy.  CHEST WALL AND LOWER NECK: Within normal limits.    ABDOMEN AND PELVIS:  LIVER: Within normal limits.  BILE DUCTS: Normal caliber.  GALLBLADDER: Cholecystectomy.  SPLEEN: Within normal limits.  PANCREAS: Within normal limits.  ADRENALS: Within normal limits.  KIDNEYS/URETERS: The kidneys reveal a normal unenhanced morphology without evidence of stone or hydronephrosis.    BLADDER: Cerda catheter.  REPRODUCTIVE ORGANS: Prostate gland is not enlarged.    BOWEL: Diffuse dilatation of the colon from the cecum around to the anus. Multiple air-fluid levels noted in the colon. There is also diffuse dilatation of small bowel loops also with air-fluid levels. The stomach is not distended. Several proximal small bowel loops are not distended. A gastrostomy tube is noted in place within the stomach. Oral contrast that was administered into the stomach has progressed through the small bowel into the cecum. Findings felt to be most consistent with ileus.  PERITONEUM: No ascites.  VESSELS: Atherosclerotic changes.  RETROPERITONEUM/LYMPH NODES: No lymphadenopathy.  ABDOMINAL WALL: Postsurgical changes.  BONES: Degenerative changes.    IMPRESSION:  CT scan of the chest demonstrates diffuse bilateral infiltrates with a region of consolidation at the left lung base. Small left pneumothorax. 2 left chest tubes noted in place.    CT scan of the abdomen and pelvis demonstrates diffuse dilatation of small and large bowel loops most consistent with ileus.    < end of copied text >

## 2021-05-14 NOTE — CHART NOTE - NSCHARTNOTEFT_GEN_A_CORE
ID [id number] Updated Pt's Family at [facility]  about current clinical course and treatment plan. All questions and concerns were answered and addressed.

## 2021-05-14 NOTE — PROGRESS NOTE ADULT - ASSESSMENT
55 year old male s/p trach and left pigtail x2, open gastrostomy by general surgery     - continue pigtail x2 to LWS  - daily CXR  - continue vent to trach per ICU   - continue ICU management

## 2021-05-14 NOTE — PROGRESS NOTE ADULT - SUBJECTIVE AND OBJECTIVE BOX
Patient seen and examined at bedside in ICU    Vital Signs Last 24 Hrs  T(F): 96.3 (05-14-21 @ 04:31), Max: 100 (05-13-21 @ 12:00)  HR: 89 (05-14-21 @ 09:19)  BP: 90/46 (05-14-21 @ 08:00)  RR: 24 (05-14-21 @ 08:00)  SpO2: 96% (05-14-21 @ 09:19)  POCT Blood Glucose.: 123 mg/dL (13 May 2021 23:07)    GENERAL: NAD  CHEST/LUNG: on vent via trach; pigtail x 2 in place to LWS. Lower pigtail with 80cc/24hours of serous drainage. Anterior chest tube with no output. No air leak in either tube  ABDOMEN: soft, Nontender, Nondistended  EXTREMITIES:  no calf tenderness, No edema    I&O's Detail    13 May 2021 07:01  -  14 May 2021 07:00  --------------------------------------------------------  IN:    Dexmedetomidine: 157.5 mL    Enteral Tube Flush: 870 mL    IV PiggyBack: 300 mL  Total IN: 1327.5 mL    OUT:    Chest Tube (mL): 0 mL    Chest Tube (mL): 80 mL    Indwelling Catheter - Urethral (mL): 2090 mL    Rectal Tube (mL): 200 mL  Total OUT: 2370 mL    Total NET: -1042.5 mL    LABS:                        8.3    7.62  )-----------( 236      ( 14 May 2021 05:28 )             25.3     05-14    142  |  104  |  29<H>  ----------------------------<  98  4.2   |  40<H>  |  0.35<L>    Ca    8.8      14 May 2021 05:28  Phos  2.8     05-14  Mg     2.2     05-14    TPro  6.4  /  Alb  2.8<L>  /  TBili  0.9  /  DBili  x   /  AST  29  /  ALT  58  /  AlkPhos  75  05-14    RADIOLOGY & ADDITIONAL STUDIES:  < from: Xray Chest 1 View- PORTABLE-Routine (Xray Chest 1 View- PORTABLE-Routine in AM.) (05.13.21 @ 09:23) >  EXAM:  XR CHEST PORTABLE ROUTINE 1V                            PROCEDURE DATE:  05/13/2021          INTERPRETATION:  Chest one view    HISTORY: Pneumothorax    COMPARISON STUDY: 5/12/2021    Frontal expiratory view of the chest shows the heart to be similar in size. Tracheostomy tube, left jugular line and two left pigtail catheters are present.    The lungs show progression of patchy infiltrates and there is no evidence of pneumothorax nor pleural effusion.    IMPRESSION:  Progression of patchy infiltrates. No pneumothorax.    Thank you for the courtesy of this referral.      STEFANO SALAS MD; Attending Interventional Radiologist  This document has been electronically signed. May 13 2021  1:58PM    < end of copied text >    AM CXR 5/14 appears stable     Patient seen and examined at bedside in ICU    Vital Signs Last 24 Hrs  T(F): 96.3 (05-14-21 @ 04:31), Max: 100 (05-13-21 @ 12:00)  HR: 89 (05-14-21 @ 09:19)  BP: 90/46 (05-14-21 @ 08:00)  RR: 24 (05-14-21 @ 08:00)  SpO2: 96% (05-14-21 @ 09:19)  POCT Blood Glucose.: 123 mg/dL (13 May 2021 23:07)    GENERAL: NAD  CHEST/LUNG: on vent via trach; pigtail x 2 in place to LWS. Lower pigtail with 80cc/24hours of serous drainage. Anterior chest tube with no output. No air leak in either tube  ABDOMEN: soft, Nondistended, G-tube in place - capped.     I&O's Detail    13 May 2021 07:01  -  14 May 2021 07:00  --------------------------------------------------------  IN:    Dexmedetomidine: 157.5 mL    Enteral Tube Flush: 870 mL    IV PiggyBack: 300 mL  Total IN: 1327.5 mL    OUT:    Chest Tube (mL): 0 mL    Chest Tube (mL): 80 mL    Indwelling Catheter - Urethral (mL): 2090 mL    Rectal Tube (mL): 200 mL  Total OUT: 2370 mL    Total NET: -1042.5 mL    LABS:                        8.3    7.62  )-----------( 236      ( 14 May 2021 05:28 )             25.3     05-14    142  |  104  |  29<H>  ----------------------------<  98  4.2   |  40<H>  |  0.35<L>    Ca    8.8      14 May 2021 05:28  Phos  2.8     05-14  Mg     2.2     05-14    TPro  6.4  /  Alb  2.8<L>  /  TBili  0.9  /  DBili  x   /  AST  29  /  ALT  58  /  AlkPhos  75  05-14    RADIOLOGY & ADDITIONAL STUDIES:  < from: Xray Chest 1 View- PORTABLE-Routine (Xray Chest 1 View- PORTABLE-Routine in AM.) (05.13.21 @ 09:23) >  EXAM:  XR CHEST PORTABLE ROUTINE 1V                            PROCEDURE DATE:  05/13/2021          INTERPRETATION:  Chest one view    HISTORY: Pneumothorax    COMPARISON STUDY: 5/12/2021    Frontal expiratory view of the chest shows the heart to be similar in size. Tracheostomy tube, left jugular line and two left pigtail catheters are present.    The lungs show progression of patchy infiltrates and there is no evidence of pneumothorax nor pleural effusion.    IMPRESSION:  Progression of patchy infiltrates. No pneumothorax.    Thank you for the courtesy of this referral.      STEFANO SALAS MD; Attending Interventional Radiologist  This document has been electronically signed. May 13 2021  1:58PM    < end of copied text >    AM CXR 5/14 appears stable

## 2021-05-15 LAB
ALBUMIN SERPL ELPH-MCNC: 2.8 G/DL — LOW (ref 3.5–5)
ALP SERPL-CCNC: 77 U/L — SIGNIFICANT CHANGE UP (ref 40–120)
ALT FLD-CCNC: 58 U/L DA — SIGNIFICANT CHANGE UP (ref 10–60)
ANION GAP SERPL CALC-SCNC: 1 MMOL/L — LOW (ref 5–17)
AST SERPL-CCNC: 28 U/L — SIGNIFICANT CHANGE UP (ref 10–40)
BASE EXCESS BLDA CALC-SCNC: 11.5 MMOL/L — HIGH (ref -2–3)
BASE EXCESS BLDA CALC-SCNC: 11.6 MMOL/L — HIGH (ref -2–3)
BILIRUB SERPL-MCNC: 0.6 MG/DL — SIGNIFICANT CHANGE UP (ref 0.2–1.2)
BLOOD GAS COMMENTS ARTERIAL: SIGNIFICANT CHANGE UP
BLOOD GAS COMMENTS ARTERIAL: SIGNIFICANT CHANGE UP
BUN SERPL-MCNC: 28 MG/DL — HIGH (ref 7–18)
CALCIUM SERPL-MCNC: 8.7 MG/DL — SIGNIFICANT CHANGE UP (ref 8.4–10.5)
CHLORIDE SERPL-SCNC: 100 MMOL/L — SIGNIFICANT CHANGE UP (ref 96–108)
CO2 SERPL-SCNC: 41 MMOL/L — HIGH (ref 22–31)
CREAT SERPL-MCNC: 0.34 MG/DL — LOW (ref 0.5–1.3)
GLUCOSE BLDC GLUCOMTR-MCNC: 104 MG/DL — HIGH (ref 70–99)
GLUCOSE BLDC GLUCOMTR-MCNC: 107 MG/DL — HIGH (ref 70–99)
GLUCOSE BLDC GLUCOMTR-MCNC: 122 MG/DL — HIGH (ref 70–99)
GLUCOSE BLDC GLUCOMTR-MCNC: 124 MG/DL — HIGH (ref 70–99)
GLUCOSE SERPL-MCNC: 83 MG/DL — SIGNIFICANT CHANGE UP (ref 70–99)
HCO3 BLDA-SCNC: 38 MMOL/L — HIGH (ref 21–28)
HCO3 BLDA-SCNC: 42 MMOL/L — HIGH (ref 21–28)
HCT VFR BLD CALC: 27.4 % — LOW (ref 39–50)
HGB BLD-MCNC: 8.2 G/DL — LOW (ref 13–17)
HOROWITZ INDEX BLDA+IHG-RTO: 40 — SIGNIFICANT CHANGE UP
HOROWITZ INDEX BLDA+IHG-RTO: 50 — SIGNIFICANT CHANGE UP
MAGNESIUM SERPL-MCNC: 2.2 MG/DL — SIGNIFICANT CHANGE UP (ref 1.6–2.6)
MCHC RBC-ENTMCNC: 29.9 GM/DL — LOW (ref 32–36)
MCHC RBC-ENTMCNC: 31.5 PG — SIGNIFICANT CHANGE UP (ref 27–34)
MCV RBC AUTO: 105.4 FL — HIGH (ref 80–100)
NRBC # BLD: 0 /100 WBCS — SIGNIFICANT CHANGE UP (ref 0–0)
PCO2 BLDA: 56 MMHG — HIGH (ref 35–48)
PCO2 BLDA: 90 MMHG — CRITICAL HIGH (ref 35–48)
PH BLDA: 7.28 — LOW (ref 7.35–7.45)
PH BLDA: 7.44 — SIGNIFICANT CHANGE UP (ref 7.35–7.45)
PHOSPHATE SERPL-MCNC: 1.5 MG/DL — LOW (ref 2.5–4.5)
PLATELET # BLD AUTO: 224 K/UL — SIGNIFICANT CHANGE UP (ref 150–400)
PO2 BLDA: 173 MMHG — HIGH (ref 83–108)
PO2 BLDA: 64 MMHG — LOW (ref 83–108)
POTASSIUM SERPL-MCNC: 3.3 MMOL/L — LOW (ref 3.5–5.3)
POTASSIUM SERPL-SCNC: 3.3 MMOL/L — LOW (ref 3.5–5.3)
PROT SERPL-MCNC: 6.4 G/DL — SIGNIFICANT CHANGE UP (ref 6–8.3)
RBC # BLD: 2.6 M/UL — LOW (ref 4.2–5.8)
RBC # FLD: 16.7 % — HIGH (ref 10.3–14.5)
SAO2 % BLDA: 100 % — SIGNIFICANT CHANGE UP
SAO2 % BLDA: 92 % — SIGNIFICANT CHANGE UP
SODIUM SERPL-SCNC: 142 MMOL/L — SIGNIFICANT CHANGE UP (ref 135–145)
WBC # BLD: 8.84 K/UL — SIGNIFICANT CHANGE UP (ref 3.8–10.5)
WBC # FLD AUTO: 8.84 K/UL — SIGNIFICANT CHANGE UP (ref 3.8–10.5)

## 2021-05-15 PROCEDURE — 71045 X-RAY EXAM CHEST 1 VIEW: CPT | Mod: 26

## 2021-05-15 RX ORDER — POTASSIUM PHOSPHATE, MONOBASIC POTASSIUM PHOSPHATE, DIBASIC 236; 224 MG/ML; MG/ML
30 INJECTION, SOLUTION INTRAVENOUS ONCE
Refills: 0 | Status: COMPLETED | OUTPATIENT
Start: 2021-05-15 | End: 2021-05-15

## 2021-05-15 RX ORDER — ALBUMIN HUMAN 25 %
50 VIAL (ML) INTRAVENOUS EVERY 8 HOURS
Refills: 0 | Status: DISCONTINUED | OUTPATIENT
Start: 2021-05-15 | End: 2021-05-18

## 2021-05-15 RX ORDER — POTASSIUM CHLORIDE 20 MEQ
10 PACKET (EA) ORAL
Refills: 0 | Status: COMPLETED | OUTPATIENT
Start: 2021-05-15 | End: 2021-05-15

## 2021-05-15 RX ORDER — PHENYLEPHRINE HYDROCHLORIDE 10 MG/ML
0.9 INJECTION INTRAVENOUS
Qty: 160 | Refills: 0 | Status: DISCONTINUED | OUTPATIENT
Start: 2021-05-15 | End: 2021-05-18

## 2021-05-15 RX ORDER — PHENYLEPHRINE HYDROCHLORIDE 10 MG/ML
2 INJECTION INTRAVENOUS
Qty: 160 | Refills: 0 | Status: DISCONTINUED | OUTPATIENT
Start: 2021-05-15 | End: 2021-05-15

## 2021-05-15 RX ORDER — SODIUM CHLORIDE 9 MG/ML
1000 INJECTION, SOLUTION INTRAVENOUS
Refills: 0 | Status: DISCONTINUED | OUTPATIENT
Start: 2021-05-15 | End: 2021-05-17

## 2021-05-15 RX ADMIN — Medication 50 MILLILITER(S): at 21:11

## 2021-05-15 RX ADMIN — Medication 10 MILLIGRAM(S): at 17:06

## 2021-05-15 RX ADMIN — METHADONE HYDROCHLORIDE 10 MILLIGRAM(S): 40 TABLET ORAL at 05:00

## 2021-05-15 RX ADMIN — FENTANYL CITRATE 50 MICROGRAM(S): 50 INJECTION INTRAVENOUS at 14:16

## 2021-05-15 RX ADMIN — Medication 3 MILLIGRAM(S): at 21:11

## 2021-05-15 RX ADMIN — Medication 50 MILLILITER(S): at 09:45

## 2021-05-15 RX ADMIN — Medication 0.2 MILLIGRAM(S): at 05:00

## 2021-05-15 RX ADMIN — SODIUM CHLORIDE 30 MILLILITER(S): 9 INJECTION, SOLUTION INTRAVENOUS at 04:32

## 2021-05-15 RX ADMIN — QUETIAPINE FUMARATE 75 MILLIGRAM(S): 200 TABLET, FILM COATED ORAL at 17:06

## 2021-05-15 RX ADMIN — Medication 100 MILLIEQUIVALENT(S): at 08:39

## 2021-05-15 RX ADMIN — METHADONE HYDROCHLORIDE 10 MILLIGRAM(S): 40 TABLET ORAL at 21:10

## 2021-05-15 RX ADMIN — Medication 10 MILLIGRAM(S): at 11:04

## 2021-05-15 RX ADMIN — QUETIAPINE FUMARATE 75 MILLIGRAM(S): 200 TABLET, FILM COATED ORAL at 05:00

## 2021-05-15 RX ADMIN — POLYETHYLENE GLYCOL 3350 17 GRAM(S): 17 POWDER, FOR SOLUTION ORAL at 17:06

## 2021-05-15 RX ADMIN — FENTANYL CITRATE 50 MICROGRAM(S): 50 INJECTION INTRAVENOUS at 13:43

## 2021-05-15 RX ADMIN — Medication 0.2 MILLIGRAM(S): at 21:10

## 2021-05-15 RX ADMIN — CHLORHEXIDINE GLUCONATE 15 MILLILITER(S): 213 SOLUTION TOPICAL at 17:06

## 2021-05-15 RX ADMIN — POTASSIUM PHOSPHATE, MONOBASIC POTASSIUM PHOSPHATE, DIBASIC 83.33 MILLIMOLE(S): 236; 224 INJECTION, SOLUTION INTRAVENOUS at 08:39

## 2021-05-15 RX ADMIN — PHENYLEPHRINE HYDROCHLORIDE 14.2 MICROGRAM(S)/KG/MIN: 10 INJECTION INTRAVENOUS at 17:05

## 2021-05-15 RX ADMIN — Medication 100 MILLIEQUIVALENT(S): at 08:54

## 2021-05-15 RX ADMIN — Medication 100 MILLIEQUIVALENT(S): at 09:19

## 2021-05-15 RX ADMIN — Medication 2 MILLIGRAM(S): at 03:27

## 2021-05-15 RX ADMIN — PHENYLEPHRINE HYDROCHLORIDE 31.5 MICROGRAM(S)/KG/MIN: 10 INJECTION INTRAVENOUS at 13:56

## 2021-05-15 RX ADMIN — METHADONE HYDROCHLORIDE 10 MILLIGRAM(S): 40 TABLET ORAL at 13:01

## 2021-05-15 RX ADMIN — Medication 3 MILLIGRAM(S): at 13:01

## 2021-05-15 RX ADMIN — Medication 10 MILLIGRAM(S): at 05:00

## 2021-05-15 RX ADMIN — Medication 1000 MILLIGRAM(S): at 11:04

## 2021-05-15 RX ADMIN — Medication 0.2 MILLIGRAM(S): at 13:01

## 2021-05-15 RX ADMIN — Medication 50 MILLILITER(S): at 17:06

## 2021-05-15 RX ADMIN — POLYETHYLENE GLYCOL 3350 17 GRAM(S): 17 POWDER, FOR SOLUTION ORAL at 05:00

## 2021-05-15 RX ADMIN — ENOXAPARIN SODIUM 40 MILLIGRAM(S): 100 INJECTION SUBCUTANEOUS at 10:48

## 2021-05-15 RX ADMIN — Medication 30 MILLIGRAM(S): at 05:00

## 2021-05-15 RX ADMIN — Medication 2 MILLIGRAM(S): at 13:39

## 2021-05-15 RX ADMIN — Medication 1 APPLICATION(S): at 05:00

## 2021-05-15 RX ADMIN — Medication 3 MILLIGRAM(S): at 05:55

## 2021-05-15 RX ADMIN — Medication 10 MILLIGRAM(S): at 23:21

## 2021-05-15 RX ADMIN — CHLORHEXIDINE GLUCONATE 1 APPLICATION(S): 213 SOLUTION TOPICAL at 05:00

## 2021-05-15 RX ADMIN — Medication 1 APPLICATION(S): at 17:06

## 2021-05-15 RX ADMIN — PANTOPRAZOLE SODIUM 40 MILLIGRAM(S): 20 TABLET, DELAYED RELEASE ORAL at 11:04

## 2021-05-15 RX ADMIN — CHLORHEXIDINE GLUCONATE 15 MILLILITER(S): 213 SOLUTION TOPICAL at 05:00

## 2021-05-15 NOTE — PROGRESS NOTE ADULT - PROBLEM SELECTOR PLAN 1
isolation precautions DC  Cont mechanical ventilation - S.P trach.   Wean as tolerated  Tracheal care  Pulmonary toilet  Decubitus prevention  Supplemental nutrition  ICU management.   Monitor oxygen sat  Monitor LFT, LDH, CRP, D-Dimer, Ferritin and procalcitonin  Vit C, D and zinc supp  Montelukast 10 mgs po Qhs  Daily CXR   G-tube with feeds  Replace lytes  Pulmonary toilet  DVT and GI PPX. isolation precautions DC  Cont mechanical ventilation - S.P trach.   Wean as tolerated  Tracheal care  Pulmonary toilet  Decubitus prevention  Supplemental nutrition  ICU management.   Fdollow up ABGs  Monitor oxygen sat  Monitor LFT, LDH, CRP, D-Dimer, Ferritin and procalcitonin  Vit C, D and zinc supp  Montelukast 10 mgs po Qhs  Daily CXR   G-tube with feeds  Replace lytes  Pulmonary toilet  DVT and GI PPX.

## 2021-05-15 NOTE — PROGRESS NOTE ADULT - SUBJECTIVE AND OBJECTIVE BOX
Patient is a 55y old  Male who presents with a chief complaint of SOB (15 May 2021 08:04)    Awake, not alert, comfortable in bed in NAD. Still on ventilator via trach    INTERVAL HPI/OVERNIGHT EVENTS:      VITAL SIGNS:  T(F): 99.8 (05-15-21 @ 08:00)  HR: 86 (05-15-21 @ 09:00)  BP: 116/59 (05-15-21 @ 09:00)  RR: 30 (05-15-21 @ 09:00)  SpO2: 100% (05-15-21 @ 09:00)  Wt(kg): --  I&O's Detail    14 May 2021 07:01  -  15 May 2021 07:00  --------------------------------------------------------  IN:    dextrose 5% + sodium chloride 0.9%: 90 mL    Enteral Tube Flush: 240 mL  Total IN: 330 mL    OUT:    Chest Tube (mL): 0 mL    Chest Tube (mL): 0 mL    Indwelling Catheter - Urethral (mL): 655 mL    Rectal Tube (mL): 400 mL  Total OUT: 1055 mL    Total NET: -725 mL      15 May 2021 07:01  -  15 May 2021 10:26  --------------------------------------------------------  IN:    dextrose 5% + sodium chloride 0.9%: 90 mL    IV PiggyBack: 300 mL    Phenylephrine: 93 mL  Total IN: 483 mL    OUT:    Indwelling Catheter - Urethral (mL): 50 mL  Total OUT: 50 mL    Total NET: 433 mL        Mode: AC/ CMV (Assist Control/ Continuous Mandatory Ventilation)  RR (machine): 30  TV (machine): 400  FiO2: 50  PEEP: 5  ITime: 1  MAP: 12  PIP: 32        REVIEW OF SYSTEMS:    CONSTITUTIONAL:  No fevers, chills, sweats    HEENT:  Eyes:  No diplopia or blurred vision. ENT:  No earache, sore throat or runny nose.    CARDIOVASCULAR:  No pressure, squeezing, tightness, or heaviness about the chest; no palpitations.    RESPIRATORY:  Per HPI    GASTROINTESTINAL:  No abdominal pain, nausea, vomiting or diarrhea.    GENITOURINARY:  No dysuria, frequency or urgency.    NEUROLOGIC:  No paresthesias, fasciculations, seizures or weakness.    PSYCHIATRIC:  No disorder of thought or mood.      PHYSICAL EXAM:    Constitutional: Well developed and nourished  Eyes:Perrla  ENMT: normal  Neck:supple  Respiratory: good air entry  Cardiovascular: S1 S2 regular  Gastrointestinal: Soft, Non tender  Extremities: No edema  Vascular:normal  Neurological:Awake, not alert  Musculoskeletal:Normal      MEDICATIONS  (STANDING):  albumin human 25% IVPB 50 milliLiter(s) IV Intermittent every 8 hours  ascorbic acid 1000 milliGRAM(s) Oral daily  BACItracin   Ointment 1 Application(s) Topical two times a day  chlorhexidine 0.12% Liquid 15 milliLiter(s) Oral Mucosa every 12 hours  chlorhexidine 2% Cloths 1 Application(s) Topical <User Schedule>  clonazePAM  Tablet 3 milliGRAM(s) Oral every 8 hours  cloNIDine 0.2 milliGRAM(s) Oral every 8 hours  dextrose 5% + sodium chloride 0.9%. 1000 milliLiter(s) (30 mL/Hr) IV Continuous <Continuous>  enoxaparin Injectable 40 milliGRAM(s) SubCutaneous every 24 hours  insulin lispro (ADMELOG) corrective regimen sliding scale   SubCutaneous every 6 hours  methadone    Tablet 10 milliGRAM(s) Oral every 8 hours  metoclopramide   Syrup 10 milliGRAM(s) Oral every 6 hours  pantoprazole   Suspension 40 milliGRAM(s) Oral daily  phenylephrine    Infusion 2 MICROgram(s)/kG/Min (31.5 mL/Hr) IV Continuous <Continuous>  polyethylene glycol 3350 17 Gram(s) Oral two times a day  predniSONE   Tablet 30 milliGRAM(s) Oral daily  QUEtiapine 75 milliGRAM(s) Oral two times a day  trimethoprim  160 mG/sulfamethoxazole 800 mG 1 Tablet(s) Oral <User Schedule>    MEDICATIONS  (PRN):  acetaminophen    Suspension .. 650 milliGRAM(s) Oral every 12 hours PRN Temp greater or equal to 38C (100.4F)  ALBUTerol    90 MICROgram(s) HFA Inhaler 2 Puff(s) Inhalation every 6 hours PRN Shortness of Breath and/or Wheezing  artificial  tears Solution 1 Drop(s) Both EYES every 4 hours PRN Dry Eyes  fentaNYL    Injectable 50 MICROGram(s) IV Push every 6 hours PRN Severe Pain (7 - 10)  LORazepam   Injectable 2 milliGRAM(s) IV Push every 6 hours PRN Agitation  sodium chloride 0.9% lock flush 10 milliLiter(s) IV Push every 1 hour PRN Pre/post blood products, medications, blood draw, and to maintain line patency      Allergies    No Known Allergies    Intolerances        LABS:                        8.2    8.84  )-----------( 224      ( 15 May 2021 03:21 )             27.4     05-15    142  |  100  |  28<H>  ----------------------------<  83  3.3<L>   |  41<H>  |  0.34<L>    Ca    8.7      15 May 2021 03:21  Phos  1.5     05-15  Mg     2.2     05-15    TPro  6.4  /  Alb  2.8<L>  /  TBili  0.6  /  DBili  x   /  AST  28  /  ALT  58  /  AlkPhos  77  05-15        ABG - ( 15 May 2021 04:42 )  pH, Arterial: 7.28  pH, Blood: x     /  pCO2: 90    /  pO2: 64    / HCO3: 42    / Base Excess: 11.5  /  SaO2: 92                    CAPILLARY BLOOD GLUCOSE      POCT Blood Glucose.: 104 mg/dL (15 May 2021 04:43)  POCT Blood Glucose.: 98 mg/dL (14 May 2021 23:48)  POCT Blood Glucose.: 114 mg/dL (14 May 2021 17:19)  POCT Blood Glucose.: 121 mg/dL (14 May 2021 10:53)        RADIOLOGY & ADDITIONAL TESTS:    CXR:    < from: Xray Chest 1 View- PORTABLE-Routine (Xray Chest 1 View- PORTABLE-Routine in AM.) (05.14.21 @ 09:54) >  IMPRESSION:   Perihilar diffuse airspace disease and LEFT lower lobe retrocardiac airspace consolidation.  LEFT chest tube overlies upper zone. Small LEFT apical less than 5% pneumothorax.  .    < end of copied text >    Ct scan chest:    ekg;    echo:   Patient is a 55y old  Male who presents with a chief complaint of SOB (15 May 2021 08:04)    Patient is laying in bed, comfortable  in NAD. Still on ventilator via trach. Back to FIO2 50% sat 100%    INTERVAL HPI/OVERNIGHT EVENTS:      VITAL SIGNS:  T(F): 99.8 (05-15-21 @ 08:00)  HR: 86 (05-15-21 @ 09:00)  BP: 116/59 (05-15-21 @ 09:00)  RR: 30 (05-15-21 @ 09:00)  SpO2: 100% (05-15-21 @ 09:00)  Wt(kg): --  I&O's Detail    14 May 2021 07:01  -  15 May 2021 07:00  --------------------------------------------------------  IN:    dextrose 5% + sodium chloride 0.9%: 90 mL    Enteral Tube Flush: 240 mL  Total IN: 330 mL    OUT:    Chest Tube (mL): 0 mL    Chest Tube (mL): 0 mL    Indwelling Catheter - Urethral (mL): 655 mL    Rectal Tube (mL): 400 mL  Total OUT: 1055 mL    Total NET: -725 mL      15 May 2021 07:01  -  15 May 2021 10:26  --------------------------------------------------------  IN:    dextrose 5% + sodium chloride 0.9%: 90 mL    IV PiggyBack: 300 mL    Phenylephrine: 93 mL  Total IN: 483 mL    OUT:    Indwelling Catheter - Urethral (mL): 50 mL  Total OUT: 50 mL    Total NET: 433 mL        Mode: AC/ CMV (Assist Control/ Continuous Mandatory Ventilation)  RR (machine): 30  TV (machine): 400  FiO2: 50  PEEP: 5  ITime: 1  MAP: 12  PIP: 32        REVIEW OF SYSTEMS:    CONSTITUTIONAL:  No fevers, chills, sweats    HEENT:  Eyes:  No diplopia or blurred vision. ENT:  No earache, sore throat or runny nose.    CARDIOVASCULAR:  No pressure, squeezing, tightness, or heaviness about the chest; no palpitations.    RESPIRATORY:  Per HPI    GASTROINTESTINAL:  No abdominal pain, nausea, vomiting or diarrhea.    GENITOURINARY:  No dysuria, frequency or urgency.    NEUROLOGIC:  No paresthesias, fasciculations, seizures or weakness.    PSYCHIATRIC:  No disorder of thought or mood.      PHYSICAL EXAM:    Constitutional: Well developed and nourished  Eyes:Perrla  ENMT: normal  Neck:supple  Respiratory: good air entry  Cardiovascular: S1 S2 regular  Gastrointestinal: Soft, Non tender  Extremities: No edema  Vascular:normal  Neurological:Sedated  Musculoskeletal:Normal      MEDICATIONS  (STANDING):  albumin human 25% IVPB 50 milliLiter(s) IV Intermittent every 8 hours  ascorbic acid 1000 milliGRAM(s) Oral daily  BACItracin   Ointment 1 Application(s) Topical two times a day  chlorhexidine 0.12% Liquid 15 milliLiter(s) Oral Mucosa every 12 hours  chlorhexidine 2% Cloths 1 Application(s) Topical <User Schedule>  clonazePAM  Tablet 3 milliGRAM(s) Oral every 8 hours  cloNIDine 0.2 milliGRAM(s) Oral every 8 hours  dextrose 5% + sodium chloride 0.9%. 1000 milliLiter(s) (30 mL/Hr) IV Continuous <Continuous>  enoxaparin Injectable 40 milliGRAM(s) SubCutaneous every 24 hours  insulin lispro (ADMELOG) corrective regimen sliding scale   SubCutaneous every 6 hours  methadone    Tablet 10 milliGRAM(s) Oral every 8 hours  metoclopramide   Syrup 10 milliGRAM(s) Oral every 6 hours  pantoprazole   Suspension 40 milliGRAM(s) Oral daily  phenylephrine    Infusion 2 MICROgram(s)/kG/Min (31.5 mL/Hr) IV Continuous <Continuous>  polyethylene glycol 3350 17 Gram(s) Oral two times a day  predniSONE   Tablet 30 milliGRAM(s) Oral daily  QUEtiapine 75 milliGRAM(s) Oral two times a day  trimethoprim  160 mG/sulfamethoxazole 800 mG 1 Tablet(s) Oral <User Schedule>    MEDICATIONS  (PRN):  acetaminophen    Suspension .. 650 milliGRAM(s) Oral every 12 hours PRN Temp greater or equal to 38C (100.4F)  ALBUTerol    90 MICROgram(s) HFA Inhaler 2 Puff(s) Inhalation every 6 hours PRN Shortness of Breath and/or Wheezing  artificial  tears Solution 1 Drop(s) Both EYES every 4 hours PRN Dry Eyes  fentaNYL    Injectable 50 MICROGram(s) IV Push every 6 hours PRN Severe Pain (7 - 10)  LORazepam   Injectable 2 milliGRAM(s) IV Push every 6 hours PRN Agitation  sodium chloride 0.9% lock flush 10 milliLiter(s) IV Push every 1 hour PRN Pre/post blood products, medications, blood draw, and to maintain line patency      Allergies    No Known Allergies    Intolerances        LABS:                        8.2    8.84  )-----------( 224      ( 15 May 2021 03:21 )             27.4     05-15    142  |  100  |  28<H>  ----------------------------<  83  3.3<L>   |  41<H>  |  0.34<L>    Ca    8.7      15 May 2021 03:21  Phos  1.5     05-15  Mg     2.2     05-15    TPro  6.4  /  Alb  2.8<L>  /  TBili  0.6  /  DBili  x   /  AST  28  /  ALT  58  /  AlkPhos  77  05-15        ABG - ( 15 May 2021 04:42 )  pH, Arterial: 7.28  pH, Blood: x     /  pCO2: 90    /  pO2: 64    / HCO3: 42    / Base Excess: 11.5  /  SaO2: 92                    CAPILLARY BLOOD GLUCOSE      POCT Blood Glucose.: 104 mg/dL (15 May 2021 04:43)  POCT Blood Glucose.: 98 mg/dL (14 May 2021 23:48)  POCT Blood Glucose.: 114 mg/dL (14 May 2021 17:19)  POCT Blood Glucose.: 121 mg/dL (14 May 2021 10:53)        RADIOLOGY & ADDITIONAL TESTS:    CXR:    < from: Xray Chest 1 View- PORTABLE-Routine (Xray Chest 1 View- PORTABLE-Routine in AM.) (05.14.21 @ 09:54) >  IMPRESSION:   Perihilar diffuse airspace disease and LEFT lower lobe retrocardiac airspace consolidation.  LEFT chest tube overlies upper zone. Small LEFT apical less than 5% pneumothorax.  .    < end of copied text >    Ct scan chest:    ekg;    echo:

## 2021-05-15 NOTE — PROGRESS NOTE ADULT - SUBJECTIVE AND OBJECTIVE BOX
INTERVAL HPI/OVERNIGHT EVENTS:  Patient seen and examined at bedside.   No acute events overnight.       MEDICATIONS  (STANDING):  albumin human 25% IVPB 50 milliLiter(s) IV Intermittent every 8 hours  ascorbic acid 1000 milliGRAM(s) Oral daily  BACItracin   Ointment 1 Application(s) Topical two times a day  chlorhexidine 0.12% Liquid 15 milliLiter(s) Oral Mucosa every 12 hours  chlorhexidine 2% Cloths 1 Application(s) Topical <User Schedule>  clonazePAM  Tablet 3 milliGRAM(s) Oral every 8 hours  cloNIDine 0.2 milliGRAM(s) Oral every 8 hours  dextrose 5% + sodium chloride 0.9%. 1000 milliLiter(s) (30 mL/Hr) IV Continuous <Continuous>  enoxaparin Injectable 40 milliGRAM(s) SubCutaneous every 24 hours  insulin lispro (ADMELOG) corrective regimen sliding scale   SubCutaneous every 6 hours  methadone    Tablet 10 milliGRAM(s) Oral every 8 hours  metoclopramide   Syrup 10 milliGRAM(s) Oral every 6 hours  pantoprazole   Suspension 40 milliGRAM(s) Oral daily  phenylephrine    Infusion 2 MICROgram(s)/kG/Min (31.5 mL/Hr) IV Continuous <Continuous>  polyethylene glycol 3350 17 Gram(s) Oral two times a day  predniSONE   Tablet 30 milliGRAM(s) Oral daily  QUEtiapine 75 milliGRAM(s) Oral two times a day  trimethoprim  160 mG/sulfamethoxazole 800 mG 1 Tablet(s) Oral <User Schedule>    MEDICATIONS  (PRN):  acetaminophen    Suspension .. 650 milliGRAM(s) Oral every 12 hours PRN Temp greater or equal to 38C (100.4F)  ALBUTerol    90 MICROgram(s) HFA Inhaler 2 Puff(s) Inhalation every 6 hours PRN Shortness of Breath and/or Wheezing  artificial  tears Solution 1 Drop(s) Both EYES every 4 hours PRN Dry Eyes  fentaNYL    Injectable 50 MICROGram(s) IV Push every 6 hours PRN Severe Pain (7 - 10)  LORazepam   Injectable 2 milliGRAM(s) IV Push every 6 hours PRN Agitation  sodium chloride 0.9% lock flush 10 milliLiter(s) IV Push every 1 hour PRN Pre/post blood products, medications, blood draw, and to maintain line patency      Vital Signs Last 24 Hrs  T(C): 37.7 (15 May 2021 08:00), Max: 37.7 (15 May 2021 08:00)  T(F): 99.8 (15 May 2021 08:00), Max: 99.8 (15 May 2021 08:00)  HR: 74 (15 May 2021 11:40) (74 - 153)  BP: 128/64 (15 May 2021 11:00) (75/38 - 154/74)  BP(mean): 76 (15 May 2021 11:00) (47 - 93)  RR: 30 (15 May 2021 11:00) (19 - 31)  SpO2: 100% (15 May 2021 11:40) (92% - 100%)    Physical Exam  General: NAD  Pulm: Trach, ventilated, ant pigtail no airleak; post CT no airleak, to suction  Abdomen: soft, nondistended, superior aspect of the wound open and packed w/ wet to dry, no rebound, no guarding, G tube in place    I&O's Detail    14 May 2021 07:01  -  15 May 2021 07:00  --------------------------------------------------------  IN:    dextrose 5% + sodium chloride 0.9%: 90 mL    Enteral Tube Flush: 240 mL  Total IN: 330 mL    OUT:    Chest Tube (mL): 0 mL    Chest Tube (mL): 0 mL    Indwelling Catheter - Urethral (mL): 655 mL    Rectal Tube (mL): 400 mL  Total OUT: 1055 mL    Total NET: -725 mL      15 May 2021 07:01  -  15 May 2021 11:50  --------------------------------------------------------  IN:    Albumin 5%  - 250 mL: 50 mL    dextrose 5% + sodium chloride 0.9%: 150 mL    IV PiggyBack: 300 mL    IV PiggyBack: 500 mL    Phenylephrine: 155 mL  Total IN: 1155 mL    OUT:    Indwelling Catheter - Urethral (mL): 150 mL  Total OUT: 150 mL    Total NET: 1005 mL          LABS:                        8.2    8.84  )-----------( 224      ( 15 May 2021 03:21 )             27.4             05-15    142  |  100  |  28<H>  ----------------------------<  83  3.3<L>   |  41<H>  |  0.34<L>    Ca    8.7      15 May 2021 03:21  Phos  1.5     05-15  Mg     2.2     05-15    TPro  6.4  /  Alb  2.8<L>  /  TBili  0.6  /  DBili  x   /  AST  28  /  ALT  58  /  AlkPhos  77  05-15

## 2021-05-15 NOTE — PROGRESS NOTE ADULT - ASSESSMENT
Assessment and plan: 56 y/o M with no PMH is admitted to ICU for AHRF 2/2 covid19 pna     1. Acute hypoxic respiratory failure  2. ARDS 2/2 Covid pneumonia  3. Pneumothorax  4. Prediabetes  5. Abnormal TSH     Neuro  -Alert and oriented x 3 at baseline   -Off all drips overnight, and calm  -C/w Klonopin to 3mg q8h  -C/w Seroquel 75 mg BID   -Decrease methadone 10mg q8hr  - C/w Clonidine .2 mg q8hr  -Ativan PRN, fentanyl PRN  -CT head unremarkable   -Low concern for malignant hypothermia, NMS, or serotonin syndrome given waxing/waning and lack of inciting medications    Cardiovascular  # Hypotension   Off pressors  C/w midodrine to 20mg tid    # TACHYCARDIA: recurrent episodes   -HR can go to 130-150 while agitated - not likely cardiac source  -Sedation as above  -Continue monitoring      Pulm  #Acute hypoxic respiratory failure: secondary to Covid19 pneumonia  #ARDS  -Was on HFNC then got intubated 3/29  -Trach 4/22 continues on Vent   - Remdesivir was discontinued due to positive antibodies   - Finished Dexamethasone   - Covid19 PCR negative now, off isolation   - Daily SBT, difficulty tolerating 2/2 to agitation  - RR inc to 24 o/n for CO2 retention    # Bacterial Pneumonia  - Stable infiltrate on CXR ,likely developed Bacterial pneumonia   - Completed ABx Zosyn, meropenem, s/p 1 dose of Vancomycin  - MRSA negative from 3/26  - Sputum Cx growing few gram negative rods, CRE - Contact isolation  - Taper prednisone to 30 daily (5/11) for possible organizing pneumonia   - C/w bactrim empirically, TIW for PCP PPX  - Secretions from the trach, needs frequent suctions   - Pulm. Dr. Ryan  - ID Dr Anand     #Pneumothorax and pneumomediastinum:   -Thoracic surgery following  -s/p Chest tube placed 4/8 pigtail to wall suction and d/c on 4/15  -On 4/18 chest tube replaced for recurrence of PTX- c/w chest tube to suction     -anterior chest tube placed 5/6 for worsening pneumothorax with resolution of PTX  5/10 CXR with recurrence of apical pneumo - tube adjusted with improvement however still persisting but stable  -Repeat Chest CT shows persistent PTX  -Water seal inferior CT    ID  Likely bacterial pneumonia   -leukocytosis improved 7k  -Afebrile  -Tylenol PRN   -Completed ABX course  -plan as above     Fevers  Now afebrile  UA now positive - andrea changed, CXR w/o new infiltrate  No abx- no clear infectious source   Was likely 2/2 to withdrawal or ileus      Nephro  -Pitting edema to both arms, ecchymosis on right AC, blisters on left arm around brachial area    -Was retaining urine, andrea was placed 5/5  -DC doxazosin for borderline BPs  -monitor I/Os   -Completed albumin  Metabolic alkalosis stable  Completed diamox   Hold Lasix given Ileus  Start maintenance IVF if UO drops     GI  Transaminitis:   likely secondary to Covid19, Resolved   hepatitis panel -ve  -continue to monitor LFT    Ileus  Abd xray with ileus, CT showing the same  Keep NPO now, TF on hold  G Tube off suction now  Rectal tube in place  C/w Miralax BID standing and reglan   G tube 4/23, - dressing changed daily for seroma  Surgery following    Heme  Elevated d-dimer: likely secondary to Covid19   -D-dimer 423 on admission  -Last D-dimer 1434  - No active bleeding, restarted lovenox daily    Anemia  S/p 4 PRBC total  - Now Hg stable 7-9  CTH and CT abdomen w/o contrast no evidence of bleed    No active signs of bleeding (No hematemesis, melena or hematuria)  Hemolysis w/u- negative  Iron studies show AECD  Dr Andrade on board   F/u CBC daily     Endocrine  Prediabetes:  -A1c 5.8  -BS controlled  -continue HSS    Abnormal TSH:  -TSH level noted 0.26,   -Repeat TSH 0.37 and Free T4 1.82    Skin/Catheters  No rashes. Peripheral IV lines.   LIJ  Both arms with edema, right AC with ecchymosis    Prophylaxis   On Lovenox for DVT   Protonix for GI proph    GOC  FULL CODE Assessment and plan: 54 y/o M with no PMH is admitted to ICU for AHRF 2/2 covid19 pna     1. Acute hypoxic respiratory failure  2. ARDS 2/2 Covid pneumonia  3. Pneumothorax  4. Prediabetes  5. Abnormal TSH     Neuro  -Alert and oriented x 3 at baseline   -Off all drips overnight, and calm  -C/w Klonopin to 3mg q8h  -C/w Seroquel 75 mg BID   -Decrease methadone 10mg q8hr  - C/w Clonidine .2 mg q8hr  -Ativan PRN, fentanyl PRN  -CT head unremarkable   -Low concern for malignant hypothermia, NMS, or serotonin syndrome given waxing/waning and lack of inciting medications    Cardiovascular  # Hypotension   On phenylephrine now  Start on albumin  C/w midodrine to 20mg tid    # TACHYCARDIA: recurrent episodes   -HR can go to 130-150 while agitated - not likely cardiac source  -Sedation as above  -Continue monitoring      Pulm  #Acute hypoxic respiratory failure: secondary to Covid19 pneumonia  #ARDS  -Was on HFNC then got intubated 3/29  -Trach 4/22 continues on Vent   - Remdesivir was discontinued due to positive antibodies   - Finished Dexamethasone   - Covid19 PCR negative now, off isolation   - Daily SBT, difficulty tolerating 2/2 to agitation  - RR inc to 30 o/n for CO2 retention    # Bacterial Pneumonia  - Stable infiltrate on CXR ,likely developed Bacterial pneumonia   - Completed ABx Zosyn, meropenem, s/p 1 dose of Vancomycin  - MRSA negative from 3/26  - Sputum Cx growing few gram negative rods, CRE - Contact isolation  - Taper prednisone to 30 daily (5/11) for possible organizing pneumonia   - C/w bactrim empirically, TIW for PCP PPX  - Secretions from the trach, needs frequent suctions   - Pulm. Dr. Ryan  - ID Dr Anand     #Pneumothorax and pneumomediastinum:   -Thoracic surgery following  -s/p Chest tube placed 4/8 pigtail to wall suction and d/c on 4/15  -On 4/18 chest tube replaced for recurrence of PTX- c/w chest tube to suction     -anterior chest tube placed 5/6 for worsening pneumothorax with resolution of PTX  5/10 CXR with recurrence of apical pneumo - tube adjusted with improvement however still persisting but stable  -Repeat Chest CT shows persistent PTX  -Water seal inferior CT - PTX still stable    ID  Likely bacterial pneumonia   -leukocytosis improved 7k  -Afebrile  -Tylenol PRN   -Completed ABX course  -plan as above     Fevers  Now afebrile  UA now positive - andrea changed, CXR w/o new infiltrate  No abx- no clear infectious source   Was likely 2/2 to withdrawal or ileus    Nephro  -Pitting edema to both arms, ecchymosis on right AC, blisters on left arm around brachial area    -Was retaining urine, andrea was placed 5/5  -DC doxazosin for borderline BPs  -monitor I/Os   -Completed albumin  Metabolic alkalosis stable  Completed diamox   Hold Lasix given Ileus  On Maint IVF    GI  Transaminitis:   likely secondary to Covid19, Resolved   hepatitis panel -ve  -continue to monitor LFT    Ileus  Abd xray with ileus, CT showing the same  Restart trickle feeds  G Tube off suction now  Rectal tube in place  C/w Miralax BID standing and reglan   G tube 4/23, - dressing changed daily for seroma  Surgery following    Heme  Elevated d-dimer: likely secondary to Covid19   -D-dimer 423 on admission  -Last D-dimer 1434  - No active bleeding, restarted lovenox daily    Anemia  S/p 4 PRBC total  - Now Hg stable 7-9  CTH and CT abdomen w/o contrast no evidence of bleed    No active signs of bleeding (No hematemesis, melena or hematuria)  Hemolysis w/u- negative  Iron studies show AECD  Dr Andrade on board   F/u CBC daily     Endocrine  Prediabetes:  -A1c 5.8  -BS controlled  -continue HSS    Abnormal TSH:  -TSH level noted 0.26,   -Repeat TSH 0.37 and Free T4 1.82    Skin/Catheters  No rashes. Peripheral IV lines.   LIJ  Both arms with edema, right AC with ecchymosis    Prophylaxis   On Lovenox for DVT   Protonix for GI proph    GOC  FULL CODE

## 2021-05-15 NOTE — CHART NOTE - NSCHARTNOTEFT_GEN_A_CORE
Updated family at bedside  about current clinical course and treatment plan. All questions and concerns were answered and addressed.

## 2021-05-15 NOTE — PROGRESS NOTE ADULT - SUBJECTIVE AND OBJECTIVE BOX
Patient is a 55y old  Male who presents with a chief complaint of SOB (15 May 2021 01:26)    pt seen in icu [  ], reg med floor [   ], bed [  ], chair at bedside [   ], a+o x3 [  ], lethargic [  ],  nad [  ]    andrea [  ], ngt [  ], peg [  ], et tube [  ], cent line [  ], picc line [  ]        Allergies    No Known Allergies        Vitals    T(F): 98 (05-15-21 @ 04:00), Max: 98 (05-14-21 @ 20:00)  HR: 92 (05-15-21 @ 07:42) (80 - 153)  BP: 104/55 (05-15-21 @ 07:42) (75/38 - 154/74)  RR: 30 (05-15-21 @ 07:42) (19 - 31)  SpO2: 100% (05-15-21 @ 07:42) (92% - 100%)  Wt(kg): --  CAPILLARY BLOOD GLUCOSE      POCT Blood Glucose.: 104 mg/dL (15 May 2021 04:43)      Labs                          8.2    8.84  )-----------( 224      ( 15 May 2021 03:21 )             27.4       05-15    142  |  100  |  28<H>  ----------------------------<  83  3.3<L>   |  41<H>  |  0.34<L>    Ca    8.7      15 May 2021 03:21  Phos  1.5     05-15  Mg     2.2     05-15    TPro  6.4  /  Alb  2.8<L>  /  TBili  0.6  /  DBili  x   /  AST  28  /  ALT  58  /  AlkPhos  77  05-15            .Sputum Sputum  04-16 @ 04:42   Moderate Enterobacter aerogenes (Carbapenem Resistant)  Normal Respiratory Monserrat present  --  Enterobacter aerogenes (Carbapenem Resistant)      .Urine Clean Catch (Midstream)  03-15 @ 00:52   No growth  --  --          Radiology Results      Meds    MEDICATIONS  (STANDING):  ascorbic acid 1000 milliGRAM(s) Oral daily  BACItracin   Ointment 1 Application(s) Topical two times a day  chlorhexidine 0.12% Liquid 15 milliLiter(s) Oral Mucosa every 12 hours  chlorhexidine 2% Cloths 1 Application(s) Topical <User Schedule>  clonazePAM  Tablet 3 milliGRAM(s) Oral every 8 hours  cloNIDine 0.2 milliGRAM(s) Oral every 8 hours  dextrose 5% + sodium chloride 0.9%. 1000 milliLiter(s) (30 mL/Hr) IV Continuous <Continuous>  enoxaparin Injectable 40 milliGRAM(s) SubCutaneous every 24 hours  insulin lispro (ADMELOG) corrective regimen sliding scale   SubCutaneous every 6 hours  methadone    Tablet 10 milliGRAM(s) Oral every 8 hours  metoclopramide   Syrup 10 milliGRAM(s) Oral every 6 hours  pantoprazole   Suspension 40 milliGRAM(s) Oral daily  polyethylene glycol 3350 17 Gram(s) Oral two times a day  potassium chloride  10 mEq/100 mL IVPB 10 milliEquivalent(s) IV Intermittent every 1 hour  potassium phosphate IVPB 30 milliMole(s) IV Intermittent once  predniSONE   Tablet 30 milliGRAM(s) Oral daily  QUEtiapine 75 milliGRAM(s) Oral two times a day  trimethoprim  160 mG/sulfamethoxazole 800 mG 1 Tablet(s) Oral <User Schedule>      MEDICATIONS  (PRN):  acetaminophen    Suspension .. 650 milliGRAM(s) Oral every 12 hours PRN Temp greater or equal to 38C (100.4F)  ALBUTerol    90 MICROgram(s) HFA Inhaler 2 Puff(s) Inhalation every 6 hours PRN Shortness of Breath and/or Wheezing  artificial  tears Solution 1 Drop(s) Both EYES every 4 hours PRN Dry Eyes  fentaNYL    Injectable 50 MICROGram(s) IV Push every 6 hours PRN Severe Pain (7 - 10)  LORazepam   Injectable 2 milliGRAM(s) IV Push every 6 hours PRN Agitation  sodium chloride 0.9% lock flush 10 milliLiter(s) IV Push every 1 hour PRN Pre/post blood products, medications, blood draw, and to maintain line patency      Physical Exam    Neuro :  no focal deficits  Respiratory: CTA B/L  CV: RRR, S1S2, no murmurs,   Abdominal: Soft, NT, ND +BS,  Extremities: No edema, + peripheral pulses    ASSESSMENT    Hypoxemia    No pertinent past medical history    No significant past surgical history    S/P moody    S/P appendectomy        PLAN     Patient is a 55y old  Male who presents with a chief complaint of SOB (15 May 2021 01:26)    pt seen in icu [ x ], reg med floor [   ], bed [ x ], chair at bedside [   ], awake and responsive [ ], mildly sedated, [ x ],    nad [x  ]      Allergies    No Known Allergies        Vitals    T(F): 98 (05-15-21 @ 04:00), Max: 98 (05-14-21 @ 20:00)  HR: 92 (05-15-21 @ 07:42) (80 - 153)  BP: 104/55 (05-15-21 @ 07:42) (75/38 - 154/74)  RR: 30 (05-15-21 @ 07:42) (19 - 31)  SpO2: 100% (05-15-21 @ 07:42) (92% - 100%)  Wt(kg): --  CAPILLARY BLOOD GLUCOSE      POCT Blood Glucose.: 104 mg/dL (15 May 2021 04:43)      Labs                          8.2    8.84  )-----------( 224      ( 15 May 2021 03:21 )             27.4       05-15    142  |  100  |  28<H>  ----------------------------<  83  3.3<L>   |  41<H>  |  0.34<L>    Ca    8.7      15 May 2021 03:21  Phos  1.5     05-15  Mg     2.2     05-15    TPro  6.4  /  Alb  2.8<L>  /  TBili  0.6  /  DBili  x   /  AST  28  /  ALT  58  /  AlkPhos  77  05-15            .Sputum Sputum  04-16 @ 04:42   Moderate Enterobacter aerogenes (Carbapenem Resistant)  Normal Respiratory Monserrat present  --  Enterobacter aerogenes (Carbapenem Resistant)      .Urine Clean Catch (Midstream)  03-15 @ 00:52   No growth  --  --          Radiology Results    < from: Xray Abdomen 1 View PORTABLE -Routine (Xray Abdomen 1 View PORTABLE -Routine .) (05.14.21 @ 09:55) >  IMPRESSION:  Ingested contrast within nonobstructed large bowel to the level of rectum.  No acute radiographic intra-abdominal findings.    < end of copied text >      < from: Xray Chest 1 View- PORTABLE-Routine (Xray Chest 1 View- PORTABLE-Routine in AM.) (05.14.21 @ 09:54) >  IMPRESSION:   Perihilar diffuse airspace disease and LEFT lower lobe retrocardiac airspace consolidation.  LEFT chest tube overlies upper zone. Small LEFT apical less than 5% pneumothora    < end of copied text >    Meds    MEDICATIONS  (STANDING):  ascorbic acid 1000 milliGRAM(s) Oral daily  BACItracin   Ointment 1 Application(s) Topical two times a day  chlorhexidine 0.12% Liquid 15 milliLiter(s) Oral Mucosa every 12 hours  chlorhexidine 2% Cloths 1 Application(s) Topical <User Schedule>  clonazePAM  Tablet 3 milliGRAM(s) Oral every 8 hours  cloNIDine 0.2 milliGRAM(s) Oral every 8 hours  dextrose 5% + sodium chloride 0.9%. 1000 milliLiter(s) (30 mL/Hr) IV Continuous <Continuous>  enoxaparin Injectable 40 milliGRAM(s) SubCutaneous every 24 hours  insulin lispro (ADMELOG) corrective regimen sliding scale   SubCutaneous every 6 hours  methadone    Tablet 10 milliGRAM(s) Oral every 8 hours  metoclopramide   Syrup 10 milliGRAM(s) Oral every 6 hours  pantoprazole   Suspension 40 milliGRAM(s) Oral daily  polyethylene glycol 3350 17 Gram(s) Oral two times a day  potassium chloride  10 mEq/100 mL IVPB 10 milliEquivalent(s) IV Intermittent every 1 hour  potassium phosphate IVPB 30 milliMole(s) IV Intermittent once  predniSONE   Tablet 30 milliGRAM(s) Oral daily  QUEtiapine 75 milliGRAM(s) Oral two times a day  trimethoprim  160 mG/sulfamethoxazole 800 mG 1 Tablet(s) Oral <User Schedule>      MEDICATIONS  (PRN):  acetaminophen    Suspension .. 650 milliGRAM(s) Oral every 12 hours PRN Temp greater or equal to 38C (100.4F)  ALBUTerol    90 MICROgram(s) HFA Inhaler 2 Puff(s) Inhalation every 6 hours PRN Shortness of Breath and/or Wheezing  artificial  tears Solution 1 Drop(s) Both EYES every 4 hours PRN Dry Eyes  fentaNYL    Injectable 50 MICROGram(s) IV Push every 6 hours PRN Severe Pain (7 - 10)  LORazepam   Injectable 2 milliGRAM(s) IV Push every 6 hours PRN Agitation  sodium chloride 0.9% lock flush 10 milliLiter(s) IV Push every 1 hour PRN Pre/post blood products, medications, blood draw, and to maintain line patency      Physical Exam    Neuro :  no focal deficits  Respiratory: CTA B/L  CV: RRR, S1S2, no murmurs,   Abdominal: Soft, NT, ND +BS, g- tube in place, dressing cdi  Extremities: b/l ue edema, + peripheral pulses      ASSESSMENT    Hypoxemia 2nd to covid pna   transaminitis  prediabetes  h/o appendectomy  cholecystectomy        PLAN    cont cont precautions  covid 5/14/21 neg noted above  d/c remdesevir given covid ab positive noted   completed dexamethasone   started pulse steroids for 3 days - 250mg solumedrol bid now tapered off 4/14/21   C/w prednisone 30 daily    cont on bactrim empirically, TIW   cont asa, vit c,    cont albuterol inhaler   pulm f/u  procalcitonin, D-dimer, crp, ldh, ferritin, lactate noted ,    cont tylenol prn,   cont robitussin prn   pt on precedex drip    pt methadone 30 q8   pain mgmt eval noted   Pt may have had benzo withdrawal as well as opioid withdrawal causing those symptoms.    Would dc fentanyl and give hydromorphone po as prn breakthrough.  s/p intubation 3/29/21   s/p tracheostomy 4/21/21  O2 sat 100% (92% - 100%)  mv 50%  O2 via mech vent and taper fio2 as tolerated   vent mgmt as per icu  xray 3/19/21 with pneumomediastinum  rept cxr with New trace right apical pneumothorax. New mild left apical pneumothorax. Grossly stable small pneumomediastinum.  Soft tissue emphysema at the neck bases bilaterally. Grossly stable bilateral pulmonary infiltrates noted.   cxr 2/24 with No evidence of pneumothorax can be appreciated on the available image. This may be related to patient positioning. Evidence of pneumomediastinum and subcutaneous emphysema in the lower neck is again noted. There are patchy bibasilar infiltrates and elevated right hemidiaphragm noted.   cxr 3/29/21 with No significant change bilateral infiltrates. There is a small simple left apical pneumothorax. No significant pleural effusion. Bilateral subcutaneous emphysema similar to prior.   XRs on 7AM and 5PM with no obvious increase of ptx  cxr 4/4/21 with Improving bilateral airspace disease noted    cxr 4/8/21 with Small left pneumothorax noted.  thoracic surg f/u   s/p L chest tube replacement 4/18/21 for recurrence of left pneumothorax   cxr 4/20/21 with Unchanged advanced infiltrates and catheter left chest tube noted   CXR 4/11/21 with : No interval change compared to one day prior. No pneumothorax noted.   unable to flush or aspirate tube fully, noted debris in tubing which was milked out.   Once material removed from tubing able to aspirated and flush fully. Also changed dressing on pigtail which appears to be in good position.   Monitor O2 status   s/p tracheostomy 4/21/21   stay sutures out 2 weeks from OR date  cxr 4/21/21 with No evidence of active chest disease. Tracheostomy tube in place otherwise no significant change noted   cxr 4/25/21 with A 40% LEFT pneumothorax. LEFT multi-sidehole pigtail catheter overlies LEFT lower hemithorax.. Bilateral multifocal and diffuse ill-defined airspace opacities..  Follow-up AP portable chest radiograph 4/25/2021 AT 8:58 AM: Residual LEFT 30% upper zone pneumothorax. Otherwise no interval change.noted    cxr 4/30/21 Tracheostomy tube again noted. Left chest tube noted with small left apical pneumothorax unchanged. Bilateral airspace opacities overall worsening. Heart size cannot be accurately assessed in this projection noted.   cxr 5/3/21 with Large left pneumothorax noted above.   cxr 5 4/21 with No significant interval change noted above.   ct chest with Extensive chronic appearing consolidative opacities throughout the lungs, most prominent in the left lower lobe and lingula, with bronchiectasis, scarring, and volume loss. Additional scattered peripheral coarse opacities.   Mild left pneumothorax. Left lateral pleural space pigtail catheter, not in continuity with the pneumothorax. Mild bilateral hydronephrosis, similar to appearance on CT of the abdomen and pelvis on April 27. noted above   s/p 2nd chest tube 5/5/21  cxr 5/6/21 with Tracheostomy and catheter left chest tubes remain. , There are significant diffuse advanced infiltrates again noted. No pneumothorax. Above findings are similar to study earlier in the day. Present film shows a left jugular line inserted   with tip entering the superior vena cava noted   cxr 5/11/21 with Heart magnified by technique. Tracheostomy, left jugular line, and 2 catheter left chest tubes remain. Quite advanced infiltration in the lungs particularly in the left lower lobe again noted.  There is a persistent rather small left apical pneumothorax. Chest is similar to May 10 noted    cxt 5/14/21 with Perihilar diffuse airspace disease and LEFT lower lobe retrocardiac airspace consolidation. LEFT chest tube overlies upper zone. Small LEFT apical less than 5% pneumothorax noted above  s/p surgical placement of gastrostomy tube 4/23/2021.   abd xray 5/10/21 with ileus noted   dressing changed daily for seroma  tube feeding on hold   gastrostomy put to suction   CT scan of the chest 5/13/21 demonstrates diffuse bilateral infiltrates with a region of consolidation at the left lung base. Small left pneumothorax. 2 left chest tubes noted in place noted above.  CT scan of the abdomen and pelvis 5/13/21 demonstrates diffuse dilatation of small and large bowel loops most consistent with ileus noted   xray abd with  5/14/21 with Ingested contrast within nonobstructed large bowel to the level of rectum. No acute radiographic intra-abdominal findings noted above.  id f/u   No need for further antibiotics as patient completed course of Meropenem  leukocytosis resolved  sputum cx with Enterobacter aerogenes (Carbapenem Resistant) noted above   tmx 98  ua positive    andrea changed  Completed ABx Zosyn, meropenem, s/p 1 dose of Vancomycin  lispro ss   prognosis poor   s/p 4 units prbc for anemia  f/u h/h    transfuse prbc as needed  check vitamin b12  heme onc f/u  retic is elevated.    Haptoglobin normal  ? daily phlebotomy  no hemolysis, Fe/B12/folate adequate  hemolysis is unlikely even his baseline haptoglobin may be very elevated due to COVID  direct pamella neg noted    supplement potassium as needed for hypokalemia  cont current meds

## 2021-05-15 NOTE — PROGRESS NOTE ADULT - ASSESSMENT
54 yo M s/p left anterior pigtail chest tube 5/6 and left pigtail chest tube 4/18. S/p Tracheostomy 4/21, s/p G-tube placement 4/23, ileus   -Continue with daily dressing changes  -NPO hold tube feeds  -Care as per ICU team  -Discussed with Dr. Lerma who agrees

## 2021-05-15 NOTE — PROGRESS NOTE ADULT - SUBJECTIVE AND OBJECTIVE BOX
INTERVAL HPI/OVERNIGHT EVENTS: no acute events overnight    PRESSORS: [ ] YES [ ] NO  WHICH:    ANTIBIOTICS:                      Antimicrobial:  trimethoprim  160 mG/sulfamethoxazole 800 mG 1 Tablet(s) Oral <User Schedule>    Cardiovascular:  cloNIDine 0.2 milliGRAM(s) Oral every 8 hours    Pulmonary:  ALBUTerol    90 MICROgram(s) HFA Inhaler 2 Puff(s) Inhalation every 6 hours PRN    Hematalogic:  enoxaparin Injectable 40 milliGRAM(s) SubCutaneous every 24 hours    Other:  acetaminophen    Suspension .. 650 milliGRAM(s) Oral every 12 hours PRN  artificial  tears Solution 1 Drop(s) Both EYES every 4 hours PRN  ascorbic acid 1000 milliGRAM(s) Oral daily  BACItracin   Ointment 1 Application(s) Topical two times a day  chlorhexidine 0.12% Liquid 15 milliLiter(s) Oral Mucosa every 12 hours  chlorhexidine 2% Cloths 1 Application(s) Topical <User Schedule>  clonazePAM  Tablet 3 milliGRAM(s) Oral every 8 hours  fentaNYL    Injectable 50 MICROGram(s) IV Push every 6 hours PRN  insulin lispro (ADMELOG) corrective regimen sliding scale   SubCutaneous every 6 hours  LORazepam   Injectable 2 milliGRAM(s) IV Push every 6 hours PRN  methadone    Tablet 10 milliGRAM(s) Oral every 8 hours  metoclopramide   Syrup 10 milliGRAM(s) Oral every 6 hours  pantoprazole   Suspension 40 milliGRAM(s) Oral daily  polyethylene glycol 3350 17 Gram(s) Oral two times a day  predniSONE   Tablet 30 milliGRAM(s) Oral daily  QUEtiapine 75 milliGRAM(s) Oral two times a day  sodium chloride 0.9% lock flush 10 milliLiter(s) IV Push every 1 hour PRN    acetaminophen    Suspension .. 650 milliGRAM(s) Oral every 12 hours PRN  ALBUTerol    90 MICROgram(s) HFA Inhaler 2 Puff(s) Inhalation every 6 hours PRN  artificial  tears Solution 1 Drop(s) Both EYES every 4 hours PRN  ascorbic acid 1000 milliGRAM(s) Oral daily  BACItracin   Ointment 1 Application(s) Topical two times a day  chlorhexidine 0.12% Liquid 15 milliLiter(s) Oral Mucosa every 12 hours  chlorhexidine 2% Cloths 1 Application(s) Topical <User Schedule>  clonazePAM  Tablet 3 milliGRAM(s) Oral every 8 hours  cloNIDine 0.2 milliGRAM(s) Oral every 8 hours  enoxaparin Injectable 40 milliGRAM(s) SubCutaneous every 24 hours  fentaNYL    Injectable 50 MICROGram(s) IV Push every 6 hours PRN  insulin lispro (ADMELOG) corrective regimen sliding scale   SubCutaneous every 6 hours  LORazepam   Injectable 2 milliGRAM(s) IV Push every 6 hours PRN  methadone    Tablet 10 milliGRAM(s) Oral every 8 hours  metoclopramide   Syrup 10 milliGRAM(s) Oral every 6 hours  pantoprazole   Suspension 40 milliGRAM(s) Oral daily  polyethylene glycol 3350 17 Gram(s) Oral two times a day  predniSONE   Tablet 30 milliGRAM(s) Oral daily  QUEtiapine 75 milliGRAM(s) Oral two times a day  sodium chloride 0.9% lock flush 10 milliLiter(s) IV Push every 1 hour PRN  trimethoprim  160 mG/sulfamethoxazole 800 mG 1 Tablet(s) Oral <User Schedule>    Drug Dosing Weight  Height (cm): 167.6 (2021 23:15)  Weight (kg): 83.9 (2021 23:15)  BMI (kg/m2): 29.9 (2021 23:15)  BSA (m2): 1.93 (2021 23:15)    CENTRAL LINE: [ ] YES [ ] NO  LOCATION:   DATE INSERTED:  REMOVE: [ ] YES [ ] NO  EXPLAIN:    RIVERA: [ ] YES [ ] NO    DATE INSERTED:  REMOVE:  [ ] YES [ ] NO  EXPLAIN:    A-LINE:  [ ] YES [ ] NO  LOCATION:   DATE INSERTED:  REMOVE:  [ ] YES [ ] NO  EXPLAIN:    PMH -reviewed admission note, no change since admission    ICU Vital Signs Last 24 Hrs  T(C): 36.6 (15 May 2021 00:26), Max: 36.7 (14 May 2021 20:00)  T(F): 97.8 (15 May 2021 00:26), Max: 98 (14 May 2021 20:00)  HR: 92 (15 May 2021 00:02) (83 - 153)  BP: 130/66 (14 May 2021 23:00) (85/49 - 154/74)  BP(mean): 79 (14 May 2021 23:00) (55 - 93)  ABP: --  ABP(mean): --  RR: 24 (14 May 2021 23:00) (9 - 24)  SpO2: 96% (15 May 2021 00:02) (92% - 99%)      ABG - ( 14 May 2021 09:02 )  pH, Arterial: 7.40  pH, Blood: x     /  pCO2: 71    /  pO2: 93    / HCO3: 44    / Base Excess: 15.5  /  SaO2: 99                    05-13 @ 07:01  -  05-14 @ 07:00  --------------------------------------------------------  IN: 1327.5 mL / OUT: 2370 mL / NET: -1042.5 mL        Mode: AC/ CMV (Assist Control/ Continuous Mandatory Ventilation)  RR (machine): 24  TV (machine): 400  FiO2: 40  PEEP: 5  ITime: 1  MAP: 11  PIP: 31      PHYSICAL EXAM:  GENERAL: NAD, intermittently restless, diaphoretic  HEAD:  Atraumatic, Normocephalic  EYES: EOMI, PERRL, conjunctiva and sclera clear  NECK: Supple, normal appearance, No JVD; Normal thyroid; Trachea midline + trach  NERVOUS SYSTEM:  Awake + encephalopathy  Moving all 4 extremities  CHEST/LUNx chest tube on L. No chest deformity; Crackles b/l. No, rhonchi, wheezing   HEART: Tachycardic Regular rhythm; No murmurs, rubs, or gallops  ABDOMEN: PEG in place. Soft, Nontender, Nondistended; Bowel sounds present + PEG  EXTREMITIES:  Mild dependent edema. R arm edema and ecchymosis.  Peripheral Pulses intact, No clubbing, cyanosis,   SKIN: Intact, No rashes or lesions  Rivera with dark brown urine      LABS:  CBC Full  -  ( 14 May 2021 05:28 )  WBC Count : 7.62 K/uL  RBC Count : 2.31 M/uL  Hemoglobin : 8.3 g/dL  Hematocrit : 25.3 %  Platelet Count - Automated : 236 K/uL  Mean Cell Volume : 109.5 fl  Mean Cell Hemoglobin : 35.9 pg  Mean Cell Hemoglobin Concentration : 32.8 gm/dL  Auto Neutrophil # : x  Auto Lymphocyte # : x  Auto Monocyte # : x  Auto Eosinophil # : x  Auto Basophil # : x  Auto Neutrophil % : x  Auto Lymphocyte % : x  Auto Monocyte % : x  Auto Eosinophil % : x  Auto Basophil % : x        142  |  104  |  29<H>  ----------------------------<  98  4.2   |  40<H>  |  0.35<L>    Ca    8.8      14 May 2021 05:28  Phos  2.8       Mg     2.2         TPro  6.4  /  Alb  2.8<L>  /  TBili  0.9  /  DBili  x   /  AST  29  /  ALT  58  /  AlkPhos  75              RADIOLOGY & ADDITIONAL STUDIES REVIEWED:  ***    GOALS OF CARE DISCUSSION WITH PATIENT/FAMILY/PROXY:    CRITICAL CARE TIME SPENT: 35 minutes INTERVAL HPI/OVERNIGHT EVENTS: no acute events overnight. Hypotensive this Am started pheny. Restart trickle feeds. Give albumin. RR increased to 30 for CO2 retention.     PRESSORS: [x ] YES [ ] NO  WHICH:Pheny    ANTIBIOTICS:                      Antimicrobial:  trimethoprim  160 mG/sulfamethoxazole 800 mG 1 Tablet(s) Oral <User Schedule>    Cardiovascular:  cloNIDine 0.2 milliGRAM(s) Oral every 8 hours    Pulmonary:  ALBUTerol    90 MICROgram(s) HFA Inhaler 2 Puff(s) Inhalation every 6 hours PRN    Hematalogic:  enoxaparin Injectable 40 milliGRAM(s) SubCutaneous every 24 hours    Other:  acetaminophen    Suspension .. 650 milliGRAM(s) Oral every 12 hours PRN  artificial  tears Solution 1 Drop(s) Both EYES every 4 hours PRN  ascorbic acid 1000 milliGRAM(s) Oral daily  BACItracin   Ointment 1 Application(s) Topical two times a day  chlorhexidine 0.12% Liquid 15 milliLiter(s) Oral Mucosa every 12 hours  chlorhexidine 2% Cloths 1 Application(s) Topical <User Schedule>  clonazePAM  Tablet 3 milliGRAM(s) Oral every 8 hours  fentaNYL    Injectable 50 MICROGram(s) IV Push every 6 hours PRN  insulin lispro (ADMELOG) corrective regimen sliding scale   SubCutaneous every 6 hours  LORazepam   Injectable 2 milliGRAM(s) IV Push every 6 hours PRN  methadone    Tablet 10 milliGRAM(s) Oral every 8 hours  metoclopramide   Syrup 10 milliGRAM(s) Oral every 6 hours  pantoprazole   Suspension 40 milliGRAM(s) Oral daily  polyethylene glycol 3350 17 Gram(s) Oral two times a day  predniSONE   Tablet 30 milliGRAM(s) Oral daily  QUEtiapine 75 milliGRAM(s) Oral two times a day  sodium chloride 0.9% lock flush 10 milliLiter(s) IV Push every 1 hour PRN    acetaminophen    Suspension .. 650 milliGRAM(s) Oral every 12 hours PRN  ALBUTerol    90 MICROgram(s) HFA Inhaler 2 Puff(s) Inhalation every 6 hours PRN  artificial  tears Solution 1 Drop(s) Both EYES every 4 hours PRN  ascorbic acid 1000 milliGRAM(s) Oral daily  BACItracin   Ointment 1 Application(s) Topical two times a day  chlorhexidine 0.12% Liquid 15 milliLiter(s) Oral Mucosa every 12 hours  chlorhexidine 2% Cloths 1 Application(s) Topical <User Schedule>  clonazePAM  Tablet 3 milliGRAM(s) Oral every 8 hours  cloNIDine 0.2 milliGRAM(s) Oral every 8 hours  enoxaparin Injectable 40 milliGRAM(s) SubCutaneous every 24 hours  fentaNYL    Injectable 50 MICROGram(s) IV Push every 6 hours PRN  insulin lispro (ADMELOG) corrective regimen sliding scale   SubCutaneous every 6 hours  LORazepam   Injectable 2 milliGRAM(s) IV Push every 6 hours PRN  methadone    Tablet 10 milliGRAM(s) Oral every 8 hours  metoclopramide   Syrup 10 milliGRAM(s) Oral every 6 hours  pantoprazole   Suspension 40 milliGRAM(s) Oral daily  polyethylene glycol 3350 17 Gram(s) Oral two times a day  predniSONE   Tablet 30 milliGRAM(s) Oral daily  QUEtiapine 75 milliGRAM(s) Oral two times a day  sodium chloride 0.9% lock flush 10 milliLiter(s) IV Push every 1 hour PRN  trimethoprim  160 mG/sulfamethoxazole 800 mG 1 Tablet(s) Oral <User Schedule>    Drug Dosing Weight  Height (cm): 167.6 (2021 23:15)  Weight (kg): 83.9 (2021 23:15)  BMI (kg/m2): 29.9 (2021 23:15)  BSA (m2): 1.93 (2021 23:15)    CENTRAL LINE: [ x] YES [ ] NO  LOCATION:  [facility]  DATE INSERTED:   REMOVE: [ ] YES [ ] NO  EXPLAIN:    RIVERA: [ x] YES [ ] NO    DATE INSERTED:  REMOVE:  [ ] YES [ ] NO  EXPLAIN:    A-LINE:  [ ] YES [x ] NO  LOCATION:   DATE INSERTED:  REMOVE:  [ ] YES [ ] NO  EXPLAIN:    PMH -reviewed admission note, no change since admission    ICU Vital Signs Last 24 Hrs  T(C): 36.6 (15 May 2021 00:26), Max: 36.7 (14 May 2021 20:00)  T(F): 97.8 (15 May 2021 00:26), Max: 98 (14 May 2021 20:00)  HR: 92 (15 May 2021 00:02) (83 - 153)  BP: 130/66 (14 May 2021 23:00) (85/49 - 154/74)  BP(mean): 79 (14 May 2021 23:00) (55 - 93)  ABP: --  ABP(mean): --  RR: 24 (14 May 2021 23:00) (9 - 24)  SpO2: 96% (15 May 2021 00:02) (92% - 99%)      ABG - ( 14 May 2021 09:02 )  pH, Arterial: 7.40  pH, Blood: x     /  pCO2: 71    /  pO2: 93    / HCO3: 44    / Base Excess: 15.5  /  SaO2: 99                     @ 07:01  -   @ 07:00  --------------------------------------------------------  IN: 1327.5 mL / OUT: 2370 mL / NET: -1042.5 mL        Mode: AC/ CMV (Assist Control/ Continuous Mandatory Ventilation)  RR (machine): 24  TV (machine): 400  FiO2: 40  PEEP: 5  ITime: 1  MAP: 11  PIP: 31      PHYSICAL EXAM:  GENERAL: NAD, intermittently restless, diaphoretic  HEAD:  Atraumatic, Normocephalic  EYES: EOMI, PERRL, conjunctiva and sclera clear  NECK: Supple, normal appearance, No JVD; Normal thyroid; Trachea midline + trach  NERVOUS SYSTEM:  Awake + encephalopathy  Moving all 4 extremities  CHEST/LUNx chest tube on L. No chest deformity; Crackles b/l. No, rhonchi, wheezing   HEART: Tachycardic Regular rhythm; No murmurs, rubs, or gallops  ABDOMEN: PEG in place. Soft, Nontender, Nondistended; Bowel sounds present + PEG  EXTREMITIES:  Mild dependent edema. R arm edema and ecchymosis.  Peripheral Pulses intact, No clubbing, cyanosis,   SKIN: Intact, No rashes or lesions  Rivera with dark brown urine      LABS:  CBC Full  -  ( 14 May 2021 05:28 )  WBC Count : 7.62 K/uL  RBC Count : 2.31 M/uL  Hemoglobin : 8.3 g/dL  Hematocrit : 25.3 %  Platelet Count - Automated : 236 K/uL  Mean Cell Volume : 109.5 fl  Mean Cell Hemoglobin : 35.9 pg  Mean Cell Hemoglobin Concentration : 32.8 gm/dL  Auto Neutrophil # : x  Auto Lymphocyte # : x  Auto Monocyte # : x  Auto Eosinophil # : x  Auto Basophil # : x  Auto Neutrophil % : x  Auto Lymphocyte % : x  Auto Monocyte % : x  Auto Eosinophil % : x  Auto Basophil % : x        142  |  104  |  29<H>  ----------------------------<  98  4.2   |  40<H>  |  0.35<L>    Ca    8.8      14 May 2021 05:28  Phos  2.8       Mg     2.2         TPro  6.4  /  Alb  2.8<L>  /  TBili  0.9  /  DBili  x   /  AST  29  /  ALT  58  /  AlkPhos  75              RADIOLOGY & ADDITIONAL STUDIES REVIEWED:  ***    GOALS OF CARE DISCUSSION WITH PATIENT/FAMILY/PROXY:    CRITICAL CARE TIME SPENT: 35 minutes

## 2021-05-15 NOTE — PROGRESS NOTE ADULT - ATTENDING COMMENTS
55 yr old  man , non smoker with  moody 1990s presented 3/14 with x9 days worsening cough, subjective fevers, and SOB, with x2-3 days dysuria and central, non-radiating, constant CP. Admitted to medicine unit  for acute hypoxic respiratory failure secondary to pna from covid-19 infection .     Assessment:  1. Acute hypoxic respiratory failure  2. Covid-19 infection   3. Transaminitis  4. Prediabetes  5. Bilateral pneumothorax  6. Septic shock - resolved  7. Anemia  8. bowel obstruction/ileus    Plan     - persistent left apical PTX  -S/p tracheostomy placement 4/21   -S/p G-tube 4/23  -REGGIE improved, holding tubes for bowel rest, cont bowel regimen  -Cont. mechanical ventilation   -Daily weaning trials  - apical chest tube to suction, base CT on waterseal  - tapering steroids  - wean sedation  -PT evaluation  -resume trickle feed   -dvt/gi prophy  -hemodynamic monitoring   -Prognosis is guarded .

## 2021-05-16 LAB
ALBUMIN SERPL ELPH-MCNC: 3.3 G/DL — LOW (ref 3.5–5)
ALP SERPL-CCNC: 72 U/L — SIGNIFICANT CHANGE UP (ref 40–120)
ALT FLD-CCNC: 49 U/L DA — SIGNIFICANT CHANGE UP (ref 10–60)
ANION GAP SERPL CALC-SCNC: 3 MMOL/L — LOW (ref 5–17)
APPEARANCE UR: ABNORMAL
AST SERPL-CCNC: 33 U/L — SIGNIFICANT CHANGE UP (ref 10–40)
BACTERIA # UR AUTO: ABNORMAL /HPF
BASE EXCESS BLDA CALC-SCNC: 16.6 MMOL/L — HIGH (ref -2–3)
BILIRUB SERPL-MCNC: 0.8 MG/DL — SIGNIFICANT CHANGE UP (ref 0.2–1.2)
BILIRUB UR-MCNC: NEGATIVE — SIGNIFICANT CHANGE UP
BLOOD GAS COMMENTS ARTERIAL: SIGNIFICANT CHANGE UP
BUN SERPL-MCNC: 14 MG/DL — SIGNIFICANT CHANGE UP (ref 7–18)
CALCIUM SERPL-MCNC: 8.4 MG/DL — SIGNIFICANT CHANGE UP (ref 8.4–10.5)
CHLORIDE SERPL-SCNC: 103 MMOL/L — SIGNIFICANT CHANGE UP (ref 96–108)
CO2 SERPL-SCNC: 40 MMOL/L — HIGH (ref 22–31)
COLOR SPEC: ABNORMAL
CREAT SERPL-MCNC: 0.27 MG/DL — LOW (ref 0.5–1.3)
DIFF PNL FLD: ABNORMAL
EPI CELLS # UR: ABNORMAL /HPF
GLUCOSE BLDC GLUCOMTR-MCNC: 121 MG/DL — HIGH (ref 70–99)
GLUCOSE BLDC GLUCOMTR-MCNC: 152 MG/DL — HIGH (ref 70–99)
GLUCOSE SERPL-MCNC: 99 MG/DL — SIGNIFICANT CHANGE UP (ref 70–99)
GLUCOSE UR QL: NEGATIVE — SIGNIFICANT CHANGE UP
HCO3 BLDA-SCNC: 44 MMOL/L — HIGH (ref 21–28)
HCT VFR BLD CALC: 25.8 % — LOW (ref 39–50)
HGB BLD-MCNC: 7.9 G/DL — LOW (ref 13–17)
HOROWITZ INDEX BLDA+IHG-RTO: 40 — SIGNIFICANT CHANGE UP
KETONES UR-MCNC: NEGATIVE — SIGNIFICANT CHANGE UP
LEUKOCYTE ESTERASE UR-ACNC: ABNORMAL
MAGNESIUM SERPL-MCNC: 2.1 MG/DL — SIGNIFICANT CHANGE UP (ref 1.6–2.6)
MCHC RBC-ENTMCNC: 30.6 GM/DL — LOW (ref 32–36)
MCHC RBC-ENTMCNC: 32.1 PG — SIGNIFICANT CHANGE UP (ref 27–34)
MCV RBC AUTO: 104.9 FL — HIGH (ref 80–100)
NITRITE UR-MCNC: NEGATIVE — SIGNIFICANT CHANGE UP
NRBC # BLD: 0 /100 WBCS — SIGNIFICANT CHANGE UP (ref 0–0)
PCO2 BLDA: 67 MMHG — HIGH (ref 35–48)
PH BLDA: 7.43 — SIGNIFICANT CHANGE UP (ref 7.35–7.45)
PH UR: 5 — SIGNIFICANT CHANGE UP (ref 5–8)
PHOSPHATE SERPL-MCNC: 1.6 MG/DL — LOW (ref 2.5–4.5)
PLATELET # BLD AUTO: 225 K/UL — SIGNIFICANT CHANGE UP (ref 150–400)
PO2 BLDA: 83 MMHG — SIGNIFICANT CHANGE UP (ref 83–108)
POTASSIUM SERPL-MCNC: 3.1 MMOL/L — LOW (ref 3.5–5.3)
POTASSIUM SERPL-SCNC: 3.1 MMOL/L — LOW (ref 3.5–5.3)
PROT SERPL-MCNC: 6.4 G/DL — SIGNIFICANT CHANGE UP (ref 6–8.3)
PROT UR-MCNC: 100
RBC # BLD: 2.46 M/UL — LOW (ref 4.2–5.8)
RBC # FLD: 16.7 % — HIGH (ref 10.3–14.5)
RBC CASTS # UR COMP ASSIST: >50 /HPF (ref 0–2)
SAO2 % BLDA: 98 % — SIGNIFICANT CHANGE UP
SODIUM SERPL-SCNC: 146 MMOL/L — HIGH (ref 135–145)
SP GR SPEC: 1.02 — SIGNIFICANT CHANGE UP (ref 1.01–1.02)
UROBILINOGEN FLD QL: 4
WBC # BLD: 9.07 K/UL — SIGNIFICANT CHANGE UP (ref 3.8–10.5)
WBC # FLD AUTO: 9.07 K/UL — SIGNIFICANT CHANGE UP (ref 3.8–10.5)
WBC UR QL: SIGNIFICANT CHANGE UP /HPF (ref 0–5)

## 2021-05-16 PROCEDURE — 71045 X-RAY EXAM CHEST 1 VIEW: CPT | Mod: 26

## 2021-05-16 RX ORDER — ACETAMINOPHEN 500 MG
1000 TABLET ORAL ONCE
Refills: 0 | Status: COMPLETED | OUTPATIENT
Start: 2021-05-16 | End: 2021-05-16

## 2021-05-16 RX ORDER — GABAPENTIN 400 MG/1
100 CAPSULE ORAL EVERY 8 HOURS
Refills: 0 | Status: DISCONTINUED | OUTPATIENT
Start: 2021-05-16 | End: 2021-06-11

## 2021-05-16 RX ORDER — GABAPENTIN 400 MG/1
100 CAPSULE ORAL THREE TIMES A DAY
Refills: 0 | Status: DISCONTINUED | OUTPATIENT
Start: 2021-05-16 | End: 2021-05-16

## 2021-05-16 RX ORDER — POTASSIUM CHLORIDE 20 MEQ
40 PACKET (EA) ORAL EVERY 4 HOURS
Refills: 0 | Status: DISCONTINUED | OUTPATIENT
Start: 2021-05-16 | End: 2021-05-16

## 2021-05-16 RX ORDER — POTASSIUM PHOSPHATE, MONOBASIC POTASSIUM PHOSPHATE, DIBASIC 236; 224 MG/ML; MG/ML
30 INJECTION, SOLUTION INTRAVENOUS ONCE
Refills: 0 | Status: COMPLETED | OUTPATIENT
Start: 2021-05-16 | End: 2021-05-16

## 2021-05-16 RX ADMIN — ENOXAPARIN SODIUM 40 MILLIGRAM(S): 100 INJECTION SUBCUTANEOUS at 12:37

## 2021-05-16 RX ADMIN — Medication 3 MILLIGRAM(S): at 21:09

## 2021-05-16 RX ADMIN — Medication 1000 MILLIGRAM(S): at 18:10

## 2021-05-16 RX ADMIN — Medication 3 MILLIGRAM(S): at 13:03

## 2021-05-16 RX ADMIN — FENTANYL CITRATE 50 MICROGRAM(S): 50 INJECTION INTRAVENOUS at 12:41

## 2021-05-16 RX ADMIN — FENTANYL CITRATE 50 MICROGRAM(S): 50 INJECTION INTRAVENOUS at 19:42

## 2021-05-16 RX ADMIN — PANTOPRAZOLE SODIUM 40 MILLIGRAM(S): 20 TABLET, DELAYED RELEASE ORAL at 12:37

## 2021-05-16 RX ADMIN — GABAPENTIN 100 MILLIGRAM(S): 400 CAPSULE ORAL at 21:09

## 2021-05-16 RX ADMIN — Medication 2 MILLIGRAM(S): at 17:02

## 2021-05-16 RX ADMIN — Medication 1: at 13:02

## 2021-05-16 RX ADMIN — Medication 50 MILLILITER(S): at 13:03

## 2021-05-16 RX ADMIN — POTASSIUM PHOSPHATE, MONOBASIC POTASSIUM PHOSPHATE, DIBASIC 83.33 MILLIMOLE(S): 236; 224 INJECTION, SOLUTION INTRAVENOUS at 09:35

## 2021-05-16 RX ADMIN — FENTANYL CITRATE 50 MICROGRAM(S): 50 INJECTION INTRAVENOUS at 20:12

## 2021-05-16 RX ADMIN — Medication 50 MILLILITER(S): at 21:09

## 2021-05-16 RX ADMIN — Medication 650 MILLIGRAM(S): at 05:07

## 2021-05-16 RX ADMIN — QUETIAPINE FUMARATE 75 MILLIGRAM(S): 200 TABLET, FILM COATED ORAL at 05:17

## 2021-05-16 RX ADMIN — Medication 0.2 MILLIGRAM(S): at 05:05

## 2021-05-16 RX ADMIN — METHADONE HYDROCHLORIDE 10 MILLIGRAM(S): 40 TABLET ORAL at 21:09

## 2021-05-16 RX ADMIN — Medication 30 MILLIGRAM(S): at 05:17

## 2021-05-16 RX ADMIN — Medication 650 MILLIGRAM(S): at 06:05

## 2021-05-16 RX ADMIN — Medication 1 APPLICATION(S): at 17:10

## 2021-05-16 RX ADMIN — Medication 0.2 MILLIGRAM(S): at 21:09

## 2021-05-16 RX ADMIN — Medication 3 MILLIGRAM(S): at 05:06

## 2021-05-16 RX ADMIN — Medication 1 APPLICATION(S): at 05:16

## 2021-05-16 RX ADMIN — CHLORHEXIDINE GLUCONATE 1 APPLICATION(S): 213 SOLUTION TOPICAL at 05:00

## 2021-05-16 RX ADMIN — Medication 10 MILLIGRAM(S): at 05:06

## 2021-05-16 RX ADMIN — POLYETHYLENE GLYCOL 3350 17 GRAM(S): 17 POWDER, FOR SOLUTION ORAL at 17:11

## 2021-05-16 RX ADMIN — Medication 1000 MILLIGRAM(S): at 12:37

## 2021-05-16 RX ADMIN — METHADONE HYDROCHLORIDE 10 MILLIGRAM(S): 40 TABLET ORAL at 05:06

## 2021-05-16 RX ADMIN — FENTANYL CITRATE 50 MICROGRAM(S): 50 INJECTION INTRAVENOUS at 12:36

## 2021-05-16 RX ADMIN — Medication 2 MILLIGRAM(S): at 01:15

## 2021-05-16 RX ADMIN — CHLORHEXIDINE GLUCONATE 15 MILLILITER(S): 213 SOLUTION TOPICAL at 17:03

## 2021-05-16 RX ADMIN — POLYETHYLENE GLYCOL 3350 17 GRAM(S): 17 POWDER, FOR SOLUTION ORAL at 05:06

## 2021-05-16 RX ADMIN — QUETIAPINE FUMARATE 75 MILLIGRAM(S): 200 TABLET, FILM COATED ORAL at 17:11

## 2021-05-16 RX ADMIN — METHADONE HYDROCHLORIDE 10 MILLIGRAM(S): 40 TABLET ORAL at 13:03

## 2021-05-16 RX ADMIN — Medication 10 MILLIGRAM(S): at 17:10

## 2021-05-16 RX ADMIN — GABAPENTIN 100 MILLIGRAM(S): 400 CAPSULE ORAL at 17:21

## 2021-05-16 RX ADMIN — Medication 650 MILLIGRAM(S): at 21:40

## 2021-05-16 RX ADMIN — CHLORHEXIDINE GLUCONATE 15 MILLILITER(S): 213 SOLUTION TOPICAL at 05:00

## 2021-05-16 RX ADMIN — Medication 650 MILLIGRAM(S): at 22:21

## 2021-05-16 RX ADMIN — Medication 10 MILLIGRAM(S): at 13:16

## 2021-05-16 RX ADMIN — Medication 0.2 MILLIGRAM(S): at 13:16

## 2021-05-16 RX ADMIN — Medication 400 MILLIGRAM(S): at 18:01

## 2021-05-16 RX ADMIN — Medication 1 DROP(S): at 05:09

## 2021-05-16 RX ADMIN — Medication 50 MILLILITER(S): at 05:08

## 2021-05-16 NOTE — PROGRESS NOTE ADULT - SUBJECTIVE AND OBJECTIVE BOX
Patient is a 55y old  Male who presents with a chief complaint of SOB (16 May 2021 08:42)    Patient is laying in bed, comfortable  in NAD.     INTERVAL HPI/OVERNIGHT EVENTS:      VITAL SIGNS:  T(F): 100.8 (05-16-21 @ 08:02)  HR: 81 (05-16-21 @ 09:15)  BP: 129/64 (05-16-21 @ 09:15)  RR: 30 (05-16-21 @ 09:15)  SpO2: 100% (05-16-21 @ 09:15)  Wt(kg): --  I&O's Detail    15 May 2021 07:01  -  16 May 2021 07:00  --------------------------------------------------------  IN:    Albumin 5%  - 250 mL: 150 mL    dextrose 5% + sodium chloride 0.9%: 720 mL    Enteral Tube Flush: 300 mL    IV PiggyBack: 300 mL    IV PiggyBack: 500 mL    Phenylephrine: 247 mL    Phenylephrine: 103.6 mL  Total IN: 2320.6 mL    OUT:    Indwelling Catheter - Urethral (mL): 1540 mL    Rectal Tube (mL): 100 mL  Total OUT: 1640 mL    Total NET: 680.6 mL      16 May 2021 07:01  -  16 May 2021 09:23  --------------------------------------------------------  IN:  Total IN: 0 mL    OUT:    Indwelling Catheter - Urethral (mL): 25 mL  Total OUT: 25 mL    Total NET: -25 mL        Mode: AC/ CMV (Assist Control/ Continuous Mandatory Ventilation)  RR (machine): 30  TV (machine): 400  FiO2: 40  PEEP: 5  ITime: 0.7  MAP: 15  PIP: 35        REVIEW OF SYSTEMS:    CONSTITUTIONAL:  No fevers, chills, sweats    HEENT:  Eyes:  No diplopia or blurred vision. ENT:  No earache, sore throat or runny nose.    CARDIOVASCULAR:  No pressure, squeezing, tightness, or heaviness about the chest; no palpitations.    RESPIRATORY:  Per HPI    GASTROINTESTINAL:  No abdominal pain, nausea, vomiting or diarrhea.    GENITOURINARY:  No dysuria, frequency or urgency.    NEUROLOGIC:  No paresthesias, fasciculations, seizures or weakness.    PSYCHIATRIC:  No disorder of thought or mood.      PHYSICAL EXAM:    Constitutional: Well developed and nourished  Eyes:Perrla  ENMT: normal  Neck:supple  Respiratory: good air entry  Cardiovascular: S1 S2 regular  Gastrointestinal: Soft, Non tender  Extremities: No edema  Vascular:normal  Neurological:Awake, alert,Ox3  Musculoskeletal:Normal      MEDICATIONS  (STANDING):  albumin human 25% IVPB 50 milliLiter(s) IV Intermittent every 8 hours  ascorbic acid 1000 milliGRAM(s) Oral daily  BACItracin   Ointment 1 Application(s) Topical two times a day  chlorhexidine 0.12% Liquid 15 milliLiter(s) Oral Mucosa every 12 hours  chlorhexidine 2% Cloths 1 Application(s) Topical <User Schedule>  clonazePAM  Tablet 3 milliGRAM(s) Oral every 8 hours  cloNIDine 0.2 milliGRAM(s) Oral every 8 hours  dextrose 5% + sodium chloride 0.9%. 1000 milliLiter(s) (30 mL/Hr) IV Continuous <Continuous>  enoxaparin Injectable 40 milliGRAM(s) SubCutaneous every 24 hours  insulin lispro (ADMELOG) corrective regimen sliding scale   SubCutaneous every 6 hours  methadone    Tablet 10 milliGRAM(s) Oral every 8 hours  metoclopramide   Syrup 10 milliGRAM(s) Oral every 6 hours  pantoprazole   Suspension 40 milliGRAM(s) Oral daily  phenylephrine    Infusion 0.9 MICROgram(s)/kG/Min (14.2 mL/Hr) IV Continuous <Continuous>  polyethylene glycol 3350 17 Gram(s) Oral two times a day  potassium phosphate IVPB 30 milliMole(s) IV Intermittent once  predniSONE   Tablet 30 milliGRAM(s) Oral daily  QUEtiapine 75 milliGRAM(s) Oral two times a day  trimethoprim  160 mG/sulfamethoxazole 800 mG 1 Tablet(s) Oral <User Schedule>    MEDICATIONS  (PRN):  acetaminophen    Suspension .. 650 milliGRAM(s) Oral every 12 hours PRN Temp greater or equal to 38C (100.4F)  ALBUTerol    90 MICROgram(s) HFA Inhaler 2 Puff(s) Inhalation every 6 hours PRN Shortness of Breath and/or Wheezing  artificial  tears Solution 1 Drop(s) Both EYES every 4 hours PRN Dry Eyes  fentaNYL    Injectable 50 MICROGram(s) IV Push every 6 hours PRN Severe Pain (7 - 10)  LORazepam   Injectable 2 milliGRAM(s) IV Push every 6 hours PRN Agitation  sodium chloride 0.9% lock flush 10 milliLiter(s) IV Push every 1 hour PRN Pre/post blood products, medications, blood draw, and to maintain line patency      Allergies    No Known Allergies    Intolerances        LABS:                        7.9    9.07  )-----------( 225      ( 16 May 2021 04:57 )             25.8     05-16    146<H>  |  103  |  14  ----------------------------<  99  3.1<L>   |  40<H>  |  0.27<L>    Ca    8.4      16 May 2021 04:58  Phos  1.6     05-16  Mg     2.1     05-16    TPro  6.4  /  Alb  3.3<L>  /  TBili  0.8  /  DBili  x   /  AST  33  /  ALT  49  /  AlkPhos  72  05-16        ABG - ( 16 May 2021 04:25 )  pH, Arterial: 7.43  pH, Blood: x     /  pCO2: 67    /  pO2: 83    / HCO3: 44    / Base Excess: 16.6  /  SaO2: 98                    CAPILLARY BLOOD GLUCOSE      POCT Blood Glucose.: 107 mg/dL (15 May 2021 23:24)  POCT Blood Glucose.: 124 mg/dL (15 May 2021 16:40)  POCT Blood Glucose.: 122 mg/dL (15 May 2021 10:42)        RADIOLOGY & ADDITIONAL TESTS:  CXR:    < from: Xray Chest 1 View- PORTABLE-Routine (Xray Chest 1 View- PORTABLE-Routine in AM.) (05.14.21 @ 09:54) >  IMPRESSION:   Perihilar diffuse airspace disease and LEFT lower lobe retrocardiac airspace consolidation.  LEFT chest tube overlies upper zone. Small LEFT apical less than 5% pneumothorax.  .    < end of copied text >      Ct scan chest:    ekg;    echo: Patient is a 55y old  Male who presents with a chief complaint of SOB (16 May 2021 08:42)    Patient is laying in bed, comfortable  in NAD. Sedated. Remains on ventilator via trach    INTERVAL HPI/OVERNIGHT EVENTS:      VITAL SIGNS:  T(F): 100.8 (05-16-21 @ 08:02)  HR: 81 (05-16-21 @ 09:15)  BP: 129/64 (05-16-21 @ 09:15)  RR: 30 (05-16-21 @ 09:15)  SpO2: 100% (05-16-21 @ 09:15)  Wt(kg): --  I&O's Detail    15 May 2021 07:01  -  16 May 2021 07:00  --------------------------------------------------------  IN:    Albumin 5%  - 250 mL: 150 mL    dextrose 5% + sodium chloride 0.9%: 720 mL    Enteral Tube Flush: 300 mL    IV PiggyBack: 300 mL    IV PiggyBack: 500 mL    Phenylephrine: 247 mL    Phenylephrine: 103.6 mL  Total IN: 2320.6 mL    OUT:    Indwelling Catheter - Urethral (mL): 1540 mL    Rectal Tube (mL): 100 mL  Total OUT: 1640 mL    Total NET: 680.6 mL      16 May 2021 07:01  -  16 May 2021 09:23  --------------------------------------------------------  IN:  Total IN: 0 mL    OUT:    Indwelling Catheter - Urethral (mL): 25 mL  Total OUT: 25 mL    Total NET: -25 mL        Mode: AC/ CMV (Assist Control/ Continuous Mandatory Ventilation)  RR (machine): 30  TV (machine): 400  FiO2: 40  PEEP: 5  ITime: 0.7  MAP: 15  PIP: 35        REVIEW OF SYSTEMS:    CONSTITUTIONAL:  No fevers, chills, sweats    HEENT:  Eyes:  No diplopia or blurred vision. ENT:  No earache, sore throat or runny nose.    CARDIOVASCULAR:  No pressure, squeezing, tightness, or heaviness about the chest; no palpitations.    RESPIRATORY:  Per HPI    GASTROINTESTINAL:  No abdominal pain, nausea, vomiting or diarrhea.    GENITOURINARY:  No dysuria, frequency or urgency.    NEUROLOGIC:  No paresthesias, fasciculations, seizures or weakness.    PSYCHIATRIC:  No disorder of thought or mood.      PHYSICAL EXAM:    Constitutional: Well developed and nourished  Eyes:Perrla  ENMT: normal  Neck:supple  Respiratory: good air entry  Cardiovascular: S1 S2 regular  Gastrointestinal: Soft, Non tender  Extremities: No edema  Vascular:normal  Neurological: Sedated  Musculoskeletal:Normal      MEDICATIONS  (STANDING):  albumin human 25% IVPB 50 milliLiter(s) IV Intermittent every 8 hours  ascorbic acid 1000 milliGRAM(s) Oral daily  BACItracin   Ointment 1 Application(s) Topical two times a day  chlorhexidine 0.12% Liquid 15 milliLiter(s) Oral Mucosa every 12 hours  chlorhexidine 2% Cloths 1 Application(s) Topical <User Schedule>  clonazePAM  Tablet 3 milliGRAM(s) Oral every 8 hours  cloNIDine 0.2 milliGRAM(s) Oral every 8 hours  dextrose 5% + sodium chloride 0.9%. 1000 milliLiter(s) (30 mL/Hr) IV Continuous <Continuous>  enoxaparin Injectable 40 milliGRAM(s) SubCutaneous every 24 hours  insulin lispro (ADMELOG) corrective regimen sliding scale   SubCutaneous every 6 hours  methadone    Tablet 10 milliGRAM(s) Oral every 8 hours  metoclopramide   Syrup 10 milliGRAM(s) Oral every 6 hours  pantoprazole   Suspension 40 milliGRAM(s) Oral daily  phenylephrine    Infusion 0.9 MICROgram(s)/kG/Min (14.2 mL/Hr) IV Continuous <Continuous>  polyethylene glycol 3350 17 Gram(s) Oral two times a day  potassium phosphate IVPB 30 milliMole(s) IV Intermittent once  predniSONE   Tablet 30 milliGRAM(s) Oral daily  QUEtiapine 75 milliGRAM(s) Oral two times a day  trimethoprim  160 mG/sulfamethoxazole 800 mG 1 Tablet(s) Oral <User Schedule>    MEDICATIONS  (PRN):  acetaminophen    Suspension .. 650 milliGRAM(s) Oral every 12 hours PRN Temp greater or equal to 38C (100.4F)  ALBUTerol    90 MICROgram(s) HFA Inhaler 2 Puff(s) Inhalation every 6 hours PRN Shortness of Breath and/or Wheezing  artificial  tears Solution 1 Drop(s) Both EYES every 4 hours PRN Dry Eyes  fentaNYL    Injectable 50 MICROGram(s) IV Push every 6 hours PRN Severe Pain (7 - 10)  LORazepam   Injectable 2 milliGRAM(s) IV Push every 6 hours PRN Agitation  sodium chloride 0.9% lock flush 10 milliLiter(s) IV Push every 1 hour PRN Pre/post blood products, medications, blood draw, and to maintain line patency      Allergies    No Known Allergies    Intolerances        LABS:                        7.9    9.07  )-----------( 225      ( 16 May 2021 04:57 )             25.8     05-16    146<H>  |  103  |  14  ----------------------------<  99  3.1<L>   |  40<H>  |  0.27<L>    Ca    8.4      16 May 2021 04:58  Phos  1.6     05-16  Mg     2.1     05-16    TPro  6.4  /  Alb  3.3<L>  /  TBili  0.8  /  DBili  x   /  AST  33  /  ALT  49  /  AlkPhos  72  05-16        ABG - ( 16 May 2021 04:25 )  pH, Arterial: 7.43  pH, Blood: x     /  pCO2: 67    /  pO2: 83    / HCO3: 44    / Base Excess: 16.6  /  SaO2: 98                    CAPILLARY BLOOD GLUCOSE      POCT Blood Glucose.: 107 mg/dL (15 May 2021 23:24)  POCT Blood Glucose.: 124 mg/dL (15 May 2021 16:40)  POCT Blood Glucose.: 122 mg/dL (15 May 2021 10:42)        RADIOLOGY & ADDITIONAL TESTS:  CXR:    < from: Xray Chest 1 View- PORTABLE-Routine (Xray Chest 1 View- PORTABLE-Routine in AM.) (05.14.21 @ 09:54) >  IMPRESSION:   Perihilar diffuse airspace disease and LEFT lower lobe retrocardiac airspace consolidation.  LEFT chest tube overlies upper zone. Small LEFT apical less than 5% pneumothorax.  .    < end of copied text >      Ct scan chest:    ekg;    echo:

## 2021-05-16 NOTE — PROGRESS NOTE ADULT - ASSESSMENT
Assessment and plan: 56 y/o M with no PMH is admitted to ICU for AHRF 2/2 covid19 pna     1. Acute hypoxic respiratory failure  2. ARDS 2/2 Covid pneumonia  3. Pneumothorax  4. Prediabetes  5. Abnormal TSH     Neuro  -Alert and oriented x 3 at baseline   -Off all drips overnight, and calm  -C/w Klonopin to 3mg q8h  -C/w Seroquel 75 mg BID   -c/w methadone 10mg q8hr  - C/w Clonidine .2 mg q8hr  -Ativan PRN, fentanyl PRN  -CT head unremarkable   -Low concern for malignant hypothermia, NMS, or serotonin syndrome given waxing/waning and lack of inciting medications    Cardiovascular  # Hypotension   On phenylephrine now  Start on albumin  C/w midodrine to 20mg tid    # TACHYCARDIA: recurrent episodes   -HR can go to 130-150 while agitated - not likely cardiac source  -Sedation as above  -Continue monitoring      Pulm  #Acute hypoxic respiratory failure: secondary to Covid19 pneumonia  #ARDS  -Was on HFNC then got intubated 3/29  -Trach 4/22 continues on Vent   - Remdesivir was discontinued due to positive antibodies   - Finished Dexamethasone   - Covid19 PCR negative now, off isolation   - Daily SBT, difficulty tolerating 2/2 to agitation  - RR inc to 30 o/n for CO2 retention    # Bacterial Pneumonia  - Stable infiltrate on CXR ,likely developed Bacterial pneumonia   - Completed ABx Zosyn, meropenem, s/p 1 dose of Vancomycin  - MRSA negative from 3/26  - Sputum Cx growing few gram negative rods, CRE - Contact isolation  - Taper prednisone to 30 daily (5/11) for possible organizing pneumonia   - C/w bactrim empirically, TIW for PCP PPX  - Secretions from the trach, needs frequent suctions   - Pulm. Dr. Ryan  - ID Dr Anand     #Pneumothorax and pneumomediastinum:   -Thoracic surgery following  -s/p Chest tube placed 4/8 pigtail to wall suction and d/c on 4/15  -On 4/18 chest tube replaced for recurrence of PTX- c/w chest tube to suction     -anterior chest tube placed 5/6 for worsening pneumothorax with resolution of PTX  5/10 CXR with recurrence of apical pneumo - tube adjusted with improvement however still persisting but stable  -Repeat Chest CT shows persistent PTX  -Water seal inferior CT - PTX still stable    ID  Likely bacterial pneumonia   -leukocytosis improved 7k  -Afebrile  -Tylenol PRN   -Completed ABX course  -plan as above     Fevers  Now afebrile  UA now positive - andrea changed, CXR w/o new infiltrate  No abx- no clear infectious source   Was likely 2/2 to withdrawal or ileus    Nephro  -Pitting edema to both arms, ecchymosis on right AC, blisters on left arm around brachial area    -Was retaining urine, andrea was placed 5/5  -DC doxazosin for borderline BPs  -monitor I/Os   -Completed albumin  Metabolic alkalosis stable  Completed diamox   Hold Lasix given Ileus  On Maint IVF    GI  Transaminitis:   likely secondary to Covid19, Resolved   hepatitis panel -ve  -continue to monitor LFT    Ileus  Abd xray with ileus, CT showing the same  Restart trickle feeds  G Tube off suction now  Rectal tube in place  C/w Miralax BID standing and reglan   G tube 4/23, - dressing changed daily for seroma  Surgery following    Heme  Elevated d-dimer: likely secondary to Covid19   -D-dimer 423 on admission  -Last D-dimer 1434  - No active bleeding, restarted lovenox daily    Anemia  S/p 4 PRBC total  - Now Hg stable 7-9  CTH and CT abdomen w/o contrast no evidence of bleed    No active signs of bleeding (No hematemesis, melena or hematuria)  Hemolysis w/u- negative  Iron studies show AECD  Dr Andrade on board   F/u CBC daily     Endocrine  Prediabetes:  -A1c 5.8  -BS controlled  -continue HSS    Abnormal TSH:  -TSH level noted 0.26,   -Repeat TSH 0.37 and Free T4 1.82    Skin/Catheters  No rashes. Peripheral IV lines.   LIJ  Both arms with edema, right AC with ecchymosis    Prophylaxis   On Lovenox for DVT   Protonix for GI proph    GOC  FULL CODE Assessment and plan: 56 y/o M with no PMH is admitted to ICU for AHRF 2/2 covid19 pna     1. Acute hypoxic respiratory failure  2. ARDS 2/2 Covid pneumonia  3. Pneumothorax  4. Prediabetes  5. Abnormal TSH     Neuro  -Alert and oriented x 3 at baseline   -Off all drips overnight, and calm  -C/w Klonopin to 3mg q8h  -C/w Seroquel 75 mg BID   -c/w methadone 10mg q8hr  - C/w Clonidine .2 mg q8hr  -Ativan PRN, fentanyl PRN  -CT head unremarkable   -Low concern for malignant hypothermia, NMS, or serotonin syndrome given waxing/waning and lack of inciting medications    Cardiovascular  # Hypotension   On phenylephrine now  continue on albumin    # TACHYCARDIA: recurrent episodes   -HR can go to 130-150 while agitated - not likely cardiac source  -Sedation as above  -Continue monitoring      Pulm  #Acute hypoxic respiratory failure: secondary to Covid19 pneumonia  #ARDS  -Was on HFNC then got intubated 3/29  -Trach 4/22 continues on Vent   - Remdesivir was discontinued due to positive antibodies   - Finished Dexamethasone   - Covid19 PCR negative now, off isolation   - Daily SBT, difficulty tolerating 2/2 to agitation  - RR inc to 30 o/n for CO2 retention    # Bacterial Pneumonia  - Stable infiltrate on CXR ,likely developed Bacterial pneumonia   - Completed ABx Zosyn, meropenem, s/p 1 dose of Vancomycin  - MRSA negative from 3/26  - Sputum Cx growing few gram negative rods, CRE - Contact isolation  - Taper prednisone to 30 daily (5/11) for possible organizing pneumonia   - C/w bactrim empirically, TIW for PCP PPX  - Secretions from the trach, needs frequent suctions   - Pulm. Dr. Ryan  - ID Dr Anand   - noted to be febrile overnight on 5/15; will monitor for now     #Pneumothorax and pneumomediastinum:   -Thoracic surgery following  -s/p Chest tube placed 4/8 pigtail to wall suction and d/c on 4/15  -On 4/18 chest tube replaced for recurrence of PTX- c/w chest tube to suction     -anterior chest tube placed 5/6 for worsening pneumothorax with resolution of PTX  5/10 CXR with recurrence of apical pneumo - tube adjusted with improvement however still persisting but stable  -Repeat Chest CT shows persistent PTX  -Water seal inferior CT - PTX still stable    ID  Likely bacterial pneumonia   -leukocytosis improved 9k  -Febrile overnight; monitor for now   -Tylenol PRN   -Completed ABX course  -plan as above     Fevers  noted to be febrile overnight; will monitor for now   andrea changed on 5/12, CXR w/o new infiltrate  No abx- no clear infectious source   Was likely 2/2 to withdrawal or ileus  f/u UA     Nephro  -Pitting edema to both arms, ecchymosis on right AC, blisters on left arm around brachial area    -Was retaining urine, andrea was placed 5/5  -DC doxazosin for borderline BPs  -monitor I/Os   -Completed albumin  Metabolic alkalosis stable  Completed diamox   Hold Lasix given Ileus  On Maint IVF  potassium and phosphorus replaced this morning; monitor electrolytes     GI  Transaminitis:   likely secondary to Covid19, Resolved   hepatitis panel -ve  -continue to monitor LFT    Ileus  Abd xray with ileus, CT showing the same  Restart trickle feeds  G Tube off suction now  Rectal tube in place  C/w Miralax BID standing and reglan   G tube 4/23, - dressing changed daily for seroma  Surgery following    Heme  Elevated d-dimer: likely secondary to Covid19   -D-dimer 423 on admission  -Last D-dimer 1434  - No active bleeding, restarted lovenox daily    Anemia  S/p 4 PRBC total  - Now Hg stable 7-9  CTH and CT abdomen w/o contrast no evidence of bleed    No active signs of bleeding (No hematemesis, melena or hematuria)  Hemolysis w/u- negative  Iron studies show ACD  Dr Andrade on board   F/u CBC daily     Endocrine  Prediabetes:  -A1c 5.8  -BS controlled  -continue HSS    Abnormal TSH:  -TSH level noted 0.26,   -Repeat TSH 0.37 and Free T4 1.82    Skin/Catheters  No rashes. Peripheral IV lines.   LIJ  Both arms with edema, right AC with ecchymosis    Prophylaxis   On Lovenox for DVT   Protonix for GI proph    GOC  FULL CODE

## 2021-05-16 NOTE — PROGRESS NOTE ADULT - SUBJECTIVE AND OBJECTIVE BOX
INTERVAL HPI/OVERNIGHT EVENTS:  Patient seen and examined at bedside.   No overnight events    MEDICATIONS  (STANDING):  albumin human 25% IVPB 50 milliLiter(s) IV Intermittent every 8 hours  ascorbic acid 1000 milliGRAM(s) Oral daily  BACItracin   Ointment 1 Application(s) Topical two times a day  chlorhexidine 0.12% Liquid 15 milliLiter(s) Oral Mucosa every 12 hours  chlorhexidine 2% Cloths 1 Application(s) Topical <User Schedule>  clonazePAM  Tablet 3 milliGRAM(s) Oral every 8 hours  cloNIDine 0.2 milliGRAM(s) Oral every 8 hours  dextrose 5% + sodium chloride 0.9%. 1000 milliLiter(s) (30 mL/Hr) IV Continuous <Continuous>  enoxaparin Injectable 40 milliGRAM(s) SubCutaneous every 24 hours  insulin lispro (ADMELOG) corrective regimen sliding scale   SubCutaneous every 6 hours  methadone    Tablet 10 milliGRAM(s) Oral every 8 hours  metoclopramide   Syrup 10 milliGRAM(s) Oral every 6 hours  pantoprazole   Suspension 40 milliGRAM(s) Oral daily  phenylephrine    Infusion 0.9 MICROgram(s)/kG/Min (14.2 mL/Hr) IV Continuous <Continuous>  polyethylene glycol 3350 17 Gram(s) Oral two times a day  potassium phosphate IVPB 30 milliMole(s) IV Intermittent once  predniSONE   Tablet 30 milliGRAM(s) Oral daily  QUEtiapine 75 milliGRAM(s) Oral two times a day  trimethoprim  160 mG/sulfamethoxazole 800 mG 1 Tablet(s) Oral <User Schedule>    MEDICATIONS  (PRN):  acetaminophen    Suspension .. 650 milliGRAM(s) Oral every 12 hours PRN Temp greater or equal to 38C (100.4F)  ALBUTerol    90 MICROgram(s) HFA Inhaler 2 Puff(s) Inhalation every 6 hours PRN Shortness of Breath and/or Wheezing  artificial  tears Solution 1 Drop(s) Both EYES every 4 hours PRN Dry Eyes  fentaNYL    Injectable 50 MICROGram(s) IV Push every 6 hours PRN Severe Pain (7 - 10)  LORazepam   Injectable 2 milliGRAM(s) IV Push every 6 hours PRN Agitation  sodium chloride 0.9% lock flush 10 milliLiter(s) IV Push every 1 hour PRN Pre/post blood products, medications, blood draw, and to maintain line patency      Vital Signs Last 24 Hrs  T(C): 38.2 (16 May 2021 08:02), Max: 38.3 (16 May 2021 04:00)  T(F): 100.8 (16 May 2021 08:02), Max: 100.9 (16 May 2021 04:00)  HR: 69 (16 May 2021 08:30) (56 - 137)  BP: 125/68 (16 May 2021 08:30) (84/40 - 172/79)  BP(mean): 80 (16 May 2021 08:30) (50 - 106)  RR: 30 (16 May 2021 08:30) (20 - 40)  SpO2: 100% (16 May 2021 08:30) (95% - 100%)    Physical:  General: Sedated   Respirations: Vent via trach  Chest: pigtail x 2, anterior to LWS 1+ airleak, Lower pigtail no airleak.     I&O's Detail    15 May 2021 07:01  -  16 May 2021 07:00  --------------------------------------------------------  IN:    Albumin 5%  - 250 mL: 150 mL    dextrose 5% + sodium chloride 0.9%: 720 mL    Enteral Tube Flush: 300 mL    IV PiggyBack: 300 mL    IV PiggyBack: 500 mL    Phenylephrine: 247 mL    Phenylephrine: 103.6 mL  Total IN: 2320.6 mL    OUT:    Indwelling Catheter - Urethral (mL): 1540 mL    Rectal Tube (mL): 100 mL  Total OUT: 1640 mL    Total NET: 680.6 mL      16 May 2021 07:01  -  16 May 2021 08:43  --------------------------------------------------------  IN:  Total IN: 0 mL    OUT:    Indwelling Catheter - Urethral (mL): 25 mL  Total OUT: 25 mL    Total NET: -25 mL          LABS:                        7.9    9.07  )-----------( 225      ( 16 May 2021 04:57 )             25.8             05-16    146<H>  |  103  |  14  ----------------------------<  99  3.1<L>   |  40<H>  |  0.27<L>    Ca    8.4      16 May 2021 04:58  Phos  1.6     05-16  Mg     2.1     05-16    TPro  6.4  /  Alb  3.3<L>  /  TBili  0.8  /  DBili  x   /  AST  33  /  ALT  49  /  AlkPhos  72  05-16

## 2021-05-16 NOTE — PROGRESS NOTE ADULT - ASSESSMENT
55 year old male s/p trach and left pigtail x2, open gastrostomy by general surgery   -F/u AM CXR   -C/w pigtail x2, anterior to LWS, monitor output   -Daily CXR   -Continue care as per ICU team  -Will discuss with Dr. Lanier

## 2021-05-16 NOTE — PROGRESS NOTE ADULT - SUBJECTIVE AND OBJECTIVE BOX
INTERVAL HPI/OVERNIGHT EVENTS:  No acute events overnight. Pt appeared awake and comfortable.    PRESSORS: [ ] YES [ ] NO  WHICH:    ANTIBIOTICS:                      Antimicrobial:  trimethoprim  160 mG/sulfamethoxazole 800 mG 1 Tablet(s) Oral <User Schedule>    Cardiovascular:  cloNIDine 0.2 milliGRAM(s) Oral every 8 hours  phenylephrine    Infusion 0.9 MICROgram(s)/kG/Min IV Continuous <Continuous>    Pulmonary:  ALBUTerol    90 MICROgram(s) HFA Inhaler 2 Puff(s) Inhalation every 6 hours PRN    Hematalogic:  enoxaparin Injectable 40 milliGRAM(s) SubCutaneous every 24 hours    Other:  acetaminophen    Suspension .. 650 milliGRAM(s) Oral every 12 hours PRN  albumin human 25% IVPB 50 milliLiter(s) IV Intermittent every 8 hours  artificial  tears Solution 1 Drop(s) Both EYES every 4 hours PRN  ascorbic acid 1000 milliGRAM(s) Oral daily  BACItracin   Ointment 1 Application(s) Topical two times a day  chlorhexidine 0.12% Liquid 15 milliLiter(s) Oral Mucosa every 12 hours  chlorhexidine 2% Cloths 1 Application(s) Topical <User Schedule>  clonazePAM  Tablet 3 milliGRAM(s) Oral every 8 hours  dextrose 5% + sodium chloride 0.9%. 1000 milliLiter(s) IV Continuous <Continuous>  fentaNYL    Injectable 50 MICROGram(s) IV Push every 6 hours PRN  insulin lispro (ADMELOG) corrective regimen sliding scale   SubCutaneous every 6 hours  LORazepam   Injectable 2 milliGRAM(s) IV Push every 6 hours PRN  methadone    Tablet 10 milliGRAM(s) Oral every 8 hours  metoclopramide   Syrup 10 milliGRAM(s) Oral every 6 hours  pantoprazole   Suspension 40 milliGRAM(s) Oral daily  polyethylene glycol 3350 17 Gram(s) Oral two times a day  predniSONE   Tablet 30 milliGRAM(s) Oral daily  QUEtiapine 75 milliGRAM(s) Oral two times a day  sodium chloride 0.9% lock flush 10 milliLiter(s) IV Push every 1 hour PRN    acetaminophen    Suspension .. 650 milliGRAM(s) Oral every 12 hours PRN  albumin human 25% IVPB 50 milliLiter(s) IV Intermittent every 8 hours  ALBUTerol    90 MICROgram(s) HFA Inhaler 2 Puff(s) Inhalation every 6 hours PRN  artificial  tears Solution 1 Drop(s) Both EYES every 4 hours PRN  ascorbic acid 1000 milliGRAM(s) Oral daily  BACItracin   Ointment 1 Application(s) Topical two times a day  chlorhexidine 0.12% Liquid 15 milliLiter(s) Oral Mucosa every 12 hours  chlorhexidine 2% Cloths 1 Application(s) Topical <User Schedule>  clonazePAM  Tablet 3 milliGRAM(s) Oral every 8 hours  cloNIDine 0.2 milliGRAM(s) Oral every 8 hours  dextrose 5% + sodium chloride 0.9%. 1000 milliLiter(s) IV Continuous <Continuous>  enoxaparin Injectable 40 milliGRAM(s) SubCutaneous every 24 hours  fentaNYL    Injectable 50 MICROGram(s) IV Push every 6 hours PRN  insulin lispro (ADMELOG) corrective regimen sliding scale   SubCutaneous every 6 hours  LORazepam   Injectable 2 milliGRAM(s) IV Push every 6 hours PRN  methadone    Tablet 10 milliGRAM(s) Oral every 8 hours  metoclopramide   Syrup 10 milliGRAM(s) Oral every 6 hours  pantoprazole   Suspension 40 milliGRAM(s) Oral daily  phenylephrine    Infusion 0.9 MICROgram(s)/kG/Min IV Continuous <Continuous>  polyethylene glycol 3350 17 Gram(s) Oral two times a day  predniSONE   Tablet 30 milliGRAM(s) Oral daily  QUEtiapine 75 milliGRAM(s) Oral two times a day  sodium chloride 0.9% lock flush 10 milliLiter(s) IV Push every 1 hour PRN  trimethoprim  160 mG/sulfamethoxazole 800 mG 1 Tablet(s) Oral <User Schedule>    Drug Dosing Weight  Height (cm): 167.6 (2021 23:15)  Weight (kg): 83.9 (2021 23:15)  BMI (kg/m2): 29.9 (2021 23:15)  BSA (m2): 1.93 (2021 23:15)    CENTRAL LINE: [ ] YES [ ] NO  LOCATION:   DATE INSERTED:  REMOVE: [ ] YES [ ] NO  EXPLAIN:    RIVERA: [ ] YES [ ] NO    DATE INSERTED:  REMOVE:  [ ] YES [ ] NO  EXPLAIN:    A-LINE:  [ ] YES [ ] NO  LOCATION:   DATE INSERTED:  REMOVE:  [ ] YES [ ] NO  EXPLAIN:    PMH -reviewed admission note, no change since admission    ICU Vital Signs Last 24 Hrs  T(C): 38.2 (16 May 2021 00:00), Max: 38.2 (16 May 2021 00:00)  T(F): 100.8 (16 May 2021 00:00), Max: 100.8 (16 May 2021 00:00)  HR: 128 (16 May 2021 00:33) (56 - 141)  BP: 149/82 (16 May 2021 00:30) (70/38 - 159/92)  BP(mean): 95 (16 May 2021 00:30) (46 - 105)  ABP: --  ABP(mean): --  RR: 27 (16 May 2021 00:30) (20 - 40)  SpO2: 97% (16 May 2021 00:33) (92% - 100%)      ABG - ( 15 May 2021 11:35 )  pH, Arterial: 7.44  pH, Blood: x     /  pCO2: 56    /  pO2: 173   / HCO3: 38    / Base Excess: 11.6  /  SaO2: 100                   05-14 @ 07:01  -  05-15 @ 07:00  --------------------------------------------------------  IN: 361 mL / OUT: 1055 mL / NET: -694 mL        Mode: AC/ CMV (Assist Control/ Continuous Mandatory Ventilation)  RR (machine): 30  TV (machine): 400  FiO2: 40  PEEP: 5  ITime: 0.7  MAP: 17  PIP: 37      PHYSICAL EXAM:  GENERAL: NAD, intermittently restless, diaphoretic  HEAD:  Atraumatic, Normocephalic  EYES: EOMI, PERRL, conjunctiva and sclera clear  NECK: Supple, normal appearance, No JVD; Normal thyroid; Trachea midline + trach  NERVOUS SYSTEM:  Awake + encephalopathy  Moving all 4 extremities  CHEST/LUNx chest tube on L. No chest deformity; Crackles b/l. No, rhonchi, wheezing   HEART: Tachycardic Regular rhythm; No murmurs, rubs, or gallops  ABDOMEN: PEG in place. Soft, Nontender, Nondistended; Bowel sounds present + PEG  EXTREMITIES:  Mild dependent edema. R arm edema and ecchymosis.  Peripheral Pulses intact, No clubbing, cyanosis,   SKIN: Intact, No rashes or lesions  [place] with dark brown urine      LABS:  CBC Full  -  ( 15 May 2021 03:21 )  WBC Count : 8.84 K/uL  RBC Count : 2.60 M/uL  Hemoglobin : 8.2 g/dL  Hematocrit : 27.4 %  Platelet Count - Automated : 224 K/uL  Mean Cell Volume : 105.4 fl  Mean Cell Hemoglobin : 31.5 pg  Mean Cell Hemoglobin Concentration : 29.9 gm/dL  Auto Neutrophil # : x  Auto Lymphocyte # : x  Auto Monocyte # : x  Auto Eosinophil # : x  Auto Basophil # : x  Auto Neutrophil % : x  Auto Lymphocyte % : x  Auto Monocyte % : x  Auto Eosinophil % : x  Auto Basophil % : x    05-15    142  |  100  |  28<H>  ----------------------------<  83  3.3<L>   |  41<H>  |  0.34<L>    Ca    8.7      15 May 2021 03:21  Phos  1.5     05-15  Mg     2.2     05-15    TPro  6.4  /  Alb  2.8<L>  /  TBili  0.6  /  DBili  x   /  AST  28  /  ALT  58  /  AlkPhos  77  -15            RADIOLOGY & ADDITIONAL STUDIES REVIEWED:  ***    GOALS OF CARE DISCUSSION WITH PATIENT/FAMILY/PROXY:    CRITICAL CARE TIME SPENT: 35 minutes INTERVAL HPI/OVERNIGHT EVENTS:  No acute events overnight. Pt appeared awake and comfortable. Noted to be febrile overnight.     PRESSORS: Yes  WHICH: Pheny    ANTIBIOTICS: Bactrim                     Antimicrobial:  trimethoprim  160 mG/sulfamethoxazole 800 mG 1 Tablet(s) Oral <User Schedule>    Cardiovascular:  cloNIDine 0.2 milliGRAM(s) Oral every 8 hours  phenylephrine    Infusion 0.9 MICROgram(s)/kG/Min IV Continuous <Continuous>    Pulmonary:  ALBUTerol    90 MICROgram(s) HFA Inhaler 2 Puff(s) Inhalation every 6 hours PRN    Hematalogic:  enoxaparin Injectable 40 milliGRAM(s) SubCutaneous every 24 hours    Other:  acetaminophen    Suspension .. 650 milliGRAM(s) Oral every 12 hours PRN  albumin human 25% IVPB 50 milliLiter(s) IV Intermittent every 8 hours  artificial  tears Solution 1 Drop(s) Both EYES every 4 hours PRN  ascorbic acid 1000 milliGRAM(s) Oral daily  BACItracin   Ointment 1 Application(s) Topical two times a day  chlorhexidine 0.12% Liquid 15 milliLiter(s) Oral Mucosa every 12 hours  chlorhexidine 2% Cloths 1 Application(s) Topical <User Schedule>  clonazePAM  Tablet 3 milliGRAM(s) Oral every 8 hours  dextrose 5% + sodium chloride 0.9%. 1000 milliLiter(s) IV Continuous <Continuous>  fentaNYL    Injectable 50 MICROGram(s) IV Push every 6 hours PRN  insulin lispro (ADMELOG) corrective regimen sliding scale   SubCutaneous every 6 hours  LORazepam   Injectable 2 milliGRAM(s) IV Push every 6 hours PRN  methadone    Tablet 10 milliGRAM(s) Oral every 8 hours  metoclopramide   Syrup 10 milliGRAM(s) Oral every 6 hours  pantoprazole   Suspension 40 milliGRAM(s) Oral daily  polyethylene glycol 3350 17 Gram(s) Oral two times a day  predniSONE   Tablet 30 milliGRAM(s) Oral daily  QUEtiapine 75 milliGRAM(s) Oral two times a day  sodium chloride 0.9% lock flush 10 milliLiter(s) IV Push every 1 hour PRN    acetaminophen    Suspension .. 650 milliGRAM(s) Oral every 12 hours PRN  albumin human 25% IVPB 50 milliLiter(s) IV Intermittent every 8 hours  ALBUTerol    90 MICROgram(s) HFA Inhaler 2 Puff(s) Inhalation every 6 hours PRN  artificial  tears Solution 1 Drop(s) Both EYES every 4 hours PRN  ascorbic acid 1000 milliGRAM(s) Oral daily  BACItracin   Ointment 1 Application(s) Topical two times a day  chlorhexidine 0.12% Liquid 15 milliLiter(s) Oral Mucosa every 12 hours  chlorhexidine 2% Cloths 1 Application(s) Topical <User Schedule>  clonazePAM  Tablet 3 milliGRAM(s) Oral every 8 hours  cloNIDine 0.2 milliGRAM(s) Oral every 8 hours  dextrose 5% + sodium chloride 0.9%. 1000 milliLiter(s) IV Continuous <Continuous>  enoxaparin Injectable 40 milliGRAM(s) SubCutaneous every 24 hours  fentaNYL    Injectable 50 MICROGram(s) IV Push every 6 hours PRN  insulin lispro (ADMELOG) corrective regimen sliding scale   SubCutaneous every 6 hours  LORazepam   Injectable 2 milliGRAM(s) IV Push every 6 hours PRN  methadone    Tablet 10 milliGRAM(s) Oral every 8 hours  metoclopramide   Syrup 10 milliGRAM(s) Oral every 6 hours  pantoprazole   Suspension 40 milliGRAM(s) Oral daily  phenylephrine    Infusion 0.9 MICROgram(s)/kG/Min IV Continuous <Continuous>  polyethylene glycol 3350 17 Gram(s) Oral two times a day  predniSONE   Tablet 30 milliGRAM(s) Oral daily  QUEtiapine 75 milliGRAM(s) Oral two times a day  sodium chloride 0.9% lock flush 10 milliLiter(s) IV Push every 1 hour PRN  trimethoprim  160 mG/sulfamethoxazole 800 mG 1 Tablet(s) Oral <User Schedule>    Drug Dosing Weight  Height (cm): 167.6 (2021 23:15)  Weight (kg): 83.9 (2021 23:15)  BMI (kg/m2): 29.9 (2021 23:15)  BSA (m2): 1.93 (2021 23:15)    CENTRAL LINE: Yes   LOCATION: Left IJ   DATE INSERTED:   REMOVE: No    RIVERA: Yes    DATE INSERTED:   REMOVE: No    A-LINE: No    PMH -reviewed admission note, no change since admission    ICU Vital Signs Last 24 Hrs  T(C): 38.2 (16 May 2021 00:00), Max: 38.2 (16 May 2021 00:00)  T(F): 100.8 (16 May 2021 00:00), Max: 100.8 (16 May 2021 00:00)  HR: 128 (16 May 2021 00:33) (56 - 141)  BP: 149/82 (16 May 2021 00:30) (70/38 - 159/92)  BP(mean): 95 (16 May 2021 00:30) (46 - 105)  RR: 27 (16 May 2021 00:30) (20 - 40)  SpO2: 97% (16 May 2021 00:33) (92% - 100%)      ABG - ( 15 May 2021 11:35 )  pH, Arterial: 7.44  pH, Blood: x     /  pCO2: 56    /  pO2: 173   / HCO3: 38    / Base Excess: 11.6  /  SaO2: 100         05-14 @ 07:01  -  -15 @ 07:00  --------------------------------------------------------  IN: 361 mL / OUT: 1055 mL / NET: -694 mL      Mode: AC/ CMV (Assist Control/ Continuous Mandatory Ventilation)  RR (machine): 30  TV (machine): 400  FiO2: 40  PEEP: 5  ITime: 0.7  MAP: 17  PIP: 37      PHYSICAL EXAM:  GENERAL: Patient seen resting in bed and in no apparent distress   HEAD: Atraumatic, Normocephalic  EYES: PERRL, conjunctiva and sclera clear  NECK: Supple, normal appearance   NERVOUS SYSTEM:  Awake + encephalopathy  Moving all 4 extremities  CHEST/LUNx chest tube on L. No chest deformity; Crackles b/l. No, rhonchi, wheezing   HEART: Tachycardic Regular rhythm; No murmurs, rubs, or gallops  ABDOMEN: PEG in place. Soft, Nontender, Nondistended; Bowel sounds present + PEG  EXTREMITIES:  Mild dependent edema. R arm edema and ecchymosis.  Peripheral Pulses intact, No clubbing, cyanosis,   SKIN: Intact, No rashes or lesions  [place] with dark brown urine      LABS:  CBC Full  -  ( 15 May 2021 03:21 )  WBC Count : 8.84 K/uL  RBC Count : 2.60 M/uL  Hemoglobin : 8.2 g/dL  Hematocrit : 27.4 %  Platelet Count - Automated : 224 K/uL  Mean Cell Volume : 105.4 fl  Mean Cell Hemoglobin : 31.5 pg  Mean Cell Hemoglobin Concentration : 29.9 gm/dL      05-15    142  |  100  |  28<H>  ----------------------------<  83  3.3<L>   |  41<H>  |  0.34<L>    Ca    8.7      15 May 2021 03:21  Phos  1.5     05-15  Mg     2.2     05-15    TPro  6.4  /  Alb  2.8<L>  /  TBili  0.6  /  DBili  x   /  AST  28  /  ALT  58  /  AlkPhos  77  05-15      RADIOLOGY & ADDITIONAL STUDIES REVIEWED: < from: Xray Chest 1 View-PORTABLE IMMEDIATE (Xray Chest 1 View-PORTABLE IMMEDIATE .) (21 @ 22:05) >  IMPRESSION:  Partial clearing of the lungs.    < end of copied text >    GOALS OF CARE DISCUSSION WITH PATIENT/FAMILY/PROXY: full code    CRITICAL CARE TIME SPENT: 35 minutes

## 2021-05-16 NOTE — PROGRESS NOTE ADULT - SUBJECTIVE AND OBJECTIVE BOX
Patient is a 55y old  Male who presents with a chief complaint of SOB (16 May 2021 00:44)    pt seen in icu [  ], reg med floor [   ], bed [  ], chair at bedside [   ], a+o x3 [  ], lethargic [  ],  nad [  ]    andrea [  ], ngt [  ], peg [  ], et tube [  ], cent line [  ], picc line [  ]        Allergies    No Known Allergies        Vitals    T(F): 100.2 (05-16-21 @ 06:00), Max: 100.9 (05-16-21 @ 04:00)  HR: 76 (05-16-21 @ 08:00) (56 - 137)  BP: 93/46 (05-16-21 @ 08:00) (84/40 - 172/79)  RR: 30 (05-16-21 @ 08:00) (20 - 40)  SpO2: 100% (05-16-21 @ 08:00) (95% - 100%)  Wt(kg): --  CAPILLARY BLOOD GLUCOSE      POCT Blood Glucose.: 107 mg/dL (15 May 2021 23:24)      Labs                          7.9    9.07  )-----------( 225      ( 16 May 2021 04:57 )             25.8       05-16    146<H>  |  103  |  14  ----------------------------<  99  3.1<L>   |  40<H>  |  0.27<L>    Ca    8.4      16 May 2021 04:58  Phos  1.6     05-16  Mg     2.1     05-16    TPro  6.4  /  Alb  3.3<L>  /  TBili  0.8  /  DBili  x   /  AST  33  /  ALT  49  /  AlkPhos  72  05-16            .Sputum Sputum  04-16 @ 04:42   Moderate Enterobacter aerogenes (Carbapenem Resistant)  Normal Respiratory Monserrat present  --  Enterobacter aerogenes (Carbapenem Resistant)      .Urine Clean Catch (Midstream)  03-15 @ 00:52   No growth  --  --          Radiology Results      Meds    MEDICATIONS  (STANDING):  albumin human 25% IVPB 50 milliLiter(s) IV Intermittent every 8 hours  ascorbic acid 1000 milliGRAM(s) Oral daily  BACItracin   Ointment 1 Application(s) Topical two times a day  chlorhexidine 0.12% Liquid 15 milliLiter(s) Oral Mucosa every 12 hours  chlorhexidine 2% Cloths 1 Application(s) Topical <User Schedule>  clonazePAM  Tablet 3 milliGRAM(s) Oral every 8 hours  cloNIDine 0.2 milliGRAM(s) Oral every 8 hours  dextrose 5% + sodium chloride 0.9%. 1000 milliLiter(s) (30 mL/Hr) IV Continuous <Continuous>  enoxaparin Injectable 40 milliGRAM(s) SubCutaneous every 24 hours  insulin lispro (ADMELOG) corrective regimen sliding scale   SubCutaneous every 6 hours  methadone    Tablet 10 milliGRAM(s) Oral every 8 hours  metoclopramide   Syrup 10 milliGRAM(s) Oral every 6 hours  pantoprazole   Suspension 40 milliGRAM(s) Oral daily  phenylephrine    Infusion 0.9 MICROgram(s)/kG/Min (14.2 mL/Hr) IV Continuous <Continuous>  polyethylene glycol 3350 17 Gram(s) Oral two times a day  predniSONE   Tablet 30 milliGRAM(s) Oral daily  QUEtiapine 75 milliGRAM(s) Oral two times a day  trimethoprim  160 mG/sulfamethoxazole 800 mG 1 Tablet(s) Oral <User Schedule>      MEDICATIONS  (PRN):  acetaminophen    Suspension .. 650 milliGRAM(s) Oral every 12 hours PRN Temp greater or equal to 38C (100.4F)  ALBUTerol    90 MICROgram(s) HFA Inhaler 2 Puff(s) Inhalation every 6 hours PRN Shortness of Breath and/or Wheezing  artificial  tears Solution 1 Drop(s) Both EYES every 4 hours PRN Dry Eyes  fentaNYL    Injectable 50 MICROGram(s) IV Push every 6 hours PRN Severe Pain (7 - 10)  LORazepam   Injectable 2 milliGRAM(s) IV Push every 6 hours PRN Agitation  sodium chloride 0.9% lock flush 10 milliLiter(s) IV Push every 1 hour PRN Pre/post blood products, medications, blood draw, and to maintain line patency      Physical Exam    Neuro :  no focal deficits  Respiratory: CTA B/L  CV: RRR, S1S2, no murmurs,   Abdominal: Soft, NT, ND +BS,  Extremities: No edema, + peripheral pulses    ASSESSMENT    Hypoxemia    No pertinent past medical history    No significant past surgical history    S/P moody    S/P appendectomy        PLAN     Patient is a 55y old  Male who presents with a chief complaint of SOB (16 May 2021 00:44)    pt seen in icu [ x ], reg med floor [   ], bed [ x ], chair at bedside [   ], awake and responsive [ ], mildly sedated, [ x ],    nad [x  ]        Allergies    No Known Allergies        Vitals    T(F): 100.2 (05-16-21 @ 06:00), Max: 100.9 (05-16-21 @ 04:00)  HR: 76 (05-16-21 @ 08:00) (56 - 137)  BP: 93/46 (05-16-21 @ 08:00) (84/40 - 172/79)  RR: 30 (05-16-21 @ 08:00) (20 - 40)  SpO2: 100% (05-16-21 @ 08:00) (95% - 100%)  Wt(kg): --  CAPILLARY BLOOD GLUCOSE      POCT Blood Glucose.: 107 mg/dL (15 May 2021 23:24)      Labs                          7.9    9.07  )-----------( 225      ( 16 May 2021 04:57 )             25.8       05-16    146<H>  |  103  |  14  ----------------------------<  99  3.1<L>   |  40<H>  |  0.27<L>    Ca    8.4      16 May 2021 04:58  Phos  1.6     05-16  Mg     2.1     05-16    TPro  6.4  /  Alb  3.3<L>  /  TBili  0.8  /  DBili  x   /  AST  33  /  ALT  49  /  AlkPhos  72  05-16            .Sputum Sputum  04-16 @ 04:42   Moderate Enterobacter aerogenes (Carbapenem Resistant)  Normal Respiratory Monserrat present  --  Enterobacter aerogenes (Carbapenem Resistant)      .Urine Clean Catch (Midstream)  03-15 @ 00:52   No growth  --  --          Radiology Results      Meds    MEDICATIONS  (STANDING):  albumin human 25% IVPB 50 milliLiter(s) IV Intermittent every 8 hours  ascorbic acid 1000 milliGRAM(s) Oral daily  BACItracin   Ointment 1 Application(s) Topical two times a day  chlorhexidine 0.12% Liquid 15 milliLiter(s) Oral Mucosa every 12 hours  chlorhexidine 2% Cloths 1 Application(s) Topical <User Schedule>  clonazePAM  Tablet 3 milliGRAM(s) Oral every 8 hours  cloNIDine 0.2 milliGRAM(s) Oral every 8 hours  dextrose 5% + sodium chloride 0.9%. 1000 milliLiter(s) (30 mL/Hr) IV Continuous <Continuous>  enoxaparin Injectable 40 milliGRAM(s) SubCutaneous every 24 hours  insulin lispro (ADMELOG) corrective regimen sliding scale   SubCutaneous every 6 hours  methadone    Tablet 10 milliGRAM(s) Oral every 8 hours  metoclopramide   Syrup 10 milliGRAM(s) Oral every 6 hours  pantoprazole   Suspension 40 milliGRAM(s) Oral daily  phenylephrine    Infusion 0.9 MICROgram(s)/kG/Min (14.2 mL/Hr) IV Continuous <Continuous>  polyethylene glycol 3350 17 Gram(s) Oral two times a day  predniSONE   Tablet 30 milliGRAM(s) Oral daily  QUEtiapine 75 milliGRAM(s) Oral two times a day  trimethoprim  160 mG/sulfamethoxazole 800 mG 1 Tablet(s) Oral <User Schedule>      MEDICATIONS  (PRN):  acetaminophen    Suspension .. 650 milliGRAM(s) Oral every 12 hours PRN Temp greater or equal to 38C (100.4F)  ALBUTerol    90 MICROgram(s) HFA Inhaler 2 Puff(s) Inhalation every 6 hours PRN Shortness of Breath and/or Wheezing  artificial  tears Solution 1 Drop(s) Both EYES every 4 hours PRN Dry Eyes  fentaNYL    Injectable 50 MICROGram(s) IV Push every 6 hours PRN Severe Pain (7 - 10)  LORazepam   Injectable 2 milliGRAM(s) IV Push every 6 hours PRN Agitation  sodium chloride 0.9% lock flush 10 milliLiter(s) IV Push every 1 hour PRN Pre/post blood products, medications, blood draw, and to maintain line patency      Physical Exam    Neuro :  no focal deficits  Respiratory: CTA B/L  CV: RRR, S1S2, no murmurs,   Abdominal: Soft, NT, ND +BS,  Extremities: No edema, + peripheral pulses      ASSESSMENT      Neuro :  no focal deficits  Respiratory: CTA B/L  CV: RRR, S1S2, no murmurs,   Abdominal: Soft, NT, ND +BS, g- tube in place, dressing cdi  Extremities: b/l ue edema, + peripheral pulses      ASSESSMENT    Hypoxemia 2nd to covid pna   transaminitis  prediabetes  h/o appendectomy  cholecystectomy        PLAN    cont cont precautions  covid 5/14/21 neg noted above  d/c remdesevir given covid ab positive noted   completed dexamethasone   started pulse steroids for 3 days - 250mg solumedrol bid now tapered off 4/14/21   C/w prednisone 30 daily    cont on bactrim empirically, TIW   cont asa, vit c,    cont albuterol inhaler   pulm f/u  procalcitonin, D-dimer, crp, ldh, ferritin, lactate noted ,    cont tylenol prn,   cont robitussin prn   pt on precedex drip    pt methadone 30 q8   pain mgmt eval noted   Pt may have had benzo withdrawal as well as opioid withdrawal causing those symptoms.    Would dc fentanyl and give hydromorphone po as prn breakthrough.   pt restarted on phenylephrine drip for hypotension  hold clonidine   s/p intubation 3/29/21   s/p tracheostomy 4/21/21  O2 sat 100% (95% - 100%) mv 30%  O2 via mech vent and taper fio2 as tolerated   vent mgmt as per icu  xray 3/19/21 with pneumomediastinum  rept cxr with New trace right apical pneumothorax. New mild left apical pneumothorax. Grossly stable small pneumomediastinum.  Soft tissue emphysema at the neck bases bilaterally. Grossly stable bilateral pulmonary infiltrates noted.   cxr 2/24 with No evidence of pneumothorax can be appreciated on the available image. This may be related to patient positioning. Evidence of pneumomediastinum and subcutaneous emphysema in the lower neck is again noted. There are patchy bibasilar infiltrates and elevated right hemidiaphragm noted.   cxr 3/29/21 with No significant change bilateral infiltrates. There is a small simple left apical pneumothorax. No significant pleural effusion. Bilateral subcutaneous emphysema similar to prior.   XRs on 7AM and 5PM with no obvious increase of ptx  cxr 4/4/21 with Improving bilateral airspace disease noted    cxr 4/8/21 with Small left pneumothorax noted.  thoracic surg f/u   s/p L chest tube replacement 4/18/21 for recurrence of left pneumothorax   cxr 4/20/21 with Unchanged advanced infiltrates and catheter left chest tube noted   CXR 4/11/21 with : No interval change compared to one day prior. No pneumothorax noted.   unable to flush or aspirate tube fully, noted debris in tubing which was milked out.   Once material removed from tubing able to aspirated and flush fully. Also changed dressing on pigtail which appears to be in good position.   Monitor O2 status   s/p tracheostomy 4/21/21   stay sutures out 2 weeks from OR date  cxr 4/21/21 with No evidence of active chest disease. Tracheostomy tube in place otherwise no significant change noted   cxr 4/25/21 with A 40% LEFT pneumothorax. LEFT multi-sidehole pigtail catheter overlies LEFT lower hemithorax.. Bilateral multifocal and diffuse ill-defined airspace opacities..  Follow-up AP portable chest radiograph 4/25/2021 AT 8:58 AM: Residual LEFT 30% upper zone pneumothorax. Otherwise no interval change.noted    cxr 4/30/21 Tracheostomy tube again noted. Left chest tube noted with small left apical pneumothorax unchanged. Bilateral airspace opacities overall worsening. Heart size cannot be accurately assessed in this projection noted.   cxr 5/3/21 with Large left pneumothorax noted above.   cxr 5 4/21 with No significant interval change noted above.   ct chest with Extensive chronic appearing consolidative opacities throughout the lungs, most prominent in the left lower lobe and lingula, with bronchiectasis, scarring, and volume loss. Additional scattered peripheral coarse opacities.   Mild left pneumothorax. Left lateral pleural space pigtail catheter, not in continuity with the pneumothorax. Mild bilateral hydronephrosis, similar to appearance on CT of the abdomen and pelvis on April 27. noted above   s/p 2nd chest tube 5/5/21  cxr 5/6/21 with Tracheostomy and catheter left chest tubes remain. , There are significant diffuse advanced infiltrates again noted. No pneumothorax. Above findings are similar to study earlier in the day. Present film shows a left jugular line inserted   with tip entering the superior vena cava noted   cxr 5/11/21 with Heart magnified by technique. Tracheostomy, left jugular line, and 2 catheter left chest tubes remain. Quite advanced infiltration in the lungs particularly in the left lower lobe again noted.  There is a persistent rather small left apical pneumothorax. Chest is similar to May 10 noted    cxt 5/14/21 with Perihilar diffuse airspace disease and LEFT lower lobe retrocardiac airspace consolidation. LEFT chest tube overlies upper zone. Small LEFT apical less than 5% pneumothorax noted above  s/p surgical placement of gastrostomy tube 4/23/2021.   abd xray 5/10/21 with ileus noted   dressing changed daily for seroma  tube feeding resumed   gastrostomy put to suction   CT scan of the chest 5/13/21 demonstrates diffuse bilateral infiltrates with a region of consolidation at the left lung base. Small left pneumothorax. 2 left chest tubes noted in place noted above.  CT scan of the abdomen and pelvis 5/13/21 demonstrates diffuse dilatation of small and large bowel loops most consistent with ileus noted   xray abd with  5/14/21 with Ingested contrast within nonobstructed large bowel to the level of rectum. No acute radiographic intra-abdominal findings noted above.  id f/u   No need for further antibiotics as patient completed course of Meropenem  leukocytosis resolved  sputum cx with Enterobacter aerogenes (Carbapenem Resistant) noted above   tmx 100.9  ua positive    andrea changed  Completed ABx Zosyn, meropenem, s/p 1 dose of Vancomycin  lispro ss   prognosis poor   s/p 4 units prbc for anemia  f/u h/h    transfuse prbc as needed  check vitamin b12  heme onc f/u  retic is elevated.    Haptoglobin normal  ? daily phlebotomy  no hemolysis, Fe/B12/folate adequate  hemolysis is unlikely even his baseline haptoglobin may be very elevated due to COVID  direct pamella neg noted    supplement potassium as needed for hypokalemia  cont current meds

## 2021-05-16 NOTE — PROGRESS NOTE ADULT - PROBLEM SELECTOR PLAN 1
isolation precautions DC  Cont mechanical ventilation - S.P trach.   Wean as tolerated  Tracheal care  Pulmonary toilet  Decubitus prevention  Supplemental nutrition  ICU management.   Fdollow up ABGs  Monitor oxygen sat  Monitor LFT, LDH, CRP, D-Dimer, Ferritin and procalcitonin  Vit C, D and zinc supp  Montelukast 10 mgs po Qhs  Daily CXR   G-tube with feeds  Replace lytes  Pulmonary toilet  DVT and GI PPX.

## 2021-05-17 LAB
ALBUMIN SERPL ELPH-MCNC: 3.6 G/DL — SIGNIFICANT CHANGE UP (ref 3.5–5)
ALP SERPL-CCNC: 69 U/L — SIGNIFICANT CHANGE UP (ref 40–120)
ALT FLD-CCNC: 48 U/L DA — SIGNIFICANT CHANGE UP (ref 10–60)
ANION GAP SERPL CALC-SCNC: 0 MMOL/L — LOW (ref 5–17)
AST SERPL-CCNC: 30 U/L — SIGNIFICANT CHANGE UP (ref 10–40)
BASE EXCESS BLDA CALC-SCNC: 16 MMOL/L — HIGH (ref -2–3)
BILIRUB SERPL-MCNC: 0.9 MG/DL — SIGNIFICANT CHANGE UP (ref 0.2–1.2)
BLOOD GAS COMMENTS ARTERIAL: SIGNIFICANT CHANGE UP
BUN SERPL-MCNC: 10 MG/DL — SIGNIFICANT CHANGE UP (ref 7–18)
CALCIUM SERPL-MCNC: 8.6 MG/DL — SIGNIFICANT CHANGE UP (ref 8.4–10.5)
CHLORIDE SERPL-SCNC: 105 MMOL/L — SIGNIFICANT CHANGE UP (ref 96–108)
CO2 SERPL-SCNC: 40 MMOL/L — HIGH (ref 22–31)
CREAT SERPL-MCNC: 0.28 MG/DL — LOW (ref 0.5–1.3)
GLUCOSE BLDC GLUCOMTR-MCNC: 102 MG/DL — HIGH (ref 70–99)
GLUCOSE BLDC GLUCOMTR-MCNC: 102 MG/DL — HIGH (ref 70–99)
GLUCOSE BLDC GLUCOMTR-MCNC: 128 MG/DL — HIGH (ref 70–99)
GLUCOSE BLDC GLUCOMTR-MCNC: 148 MG/DL — HIGH (ref 70–99)
GLUCOSE SERPL-MCNC: 99 MG/DL — SIGNIFICANT CHANGE UP (ref 70–99)
HCO3 BLDA-SCNC: 42 MMOL/L — HIGH (ref 21–28)
HCT VFR BLD CALC: 25.3 % — LOW (ref 39–50)
HGB BLD-MCNC: 7.7 G/DL — LOW (ref 13–17)
HOROWITZ INDEX BLDA+IHG-RTO: 40 — SIGNIFICANT CHANGE UP
MAGNESIUM SERPL-MCNC: 2 MG/DL — SIGNIFICANT CHANGE UP (ref 1.6–2.6)
MCHC RBC-ENTMCNC: 30.4 GM/DL — LOW (ref 32–36)
MCHC RBC-ENTMCNC: 31.7 PG — SIGNIFICANT CHANGE UP (ref 27–34)
MCV RBC AUTO: 104.1 FL — HIGH (ref 80–100)
NRBC # BLD: 0 /100 WBCS — SIGNIFICANT CHANGE UP (ref 0–0)
PCO2 BLDA: 57 MMHG — HIGH (ref 35–48)
PH BLDA: 7.48 — HIGH (ref 7.35–7.45)
PHOSPHATE SERPL-MCNC: 2.5 MG/DL — SIGNIFICANT CHANGE UP (ref 2.5–4.5)
PLATELET # BLD AUTO: 181 K/UL — SIGNIFICANT CHANGE UP (ref 150–400)
PO2 BLDA: 158 MMHG — HIGH (ref 83–108)
POTASSIUM SERPL-MCNC: 3.3 MMOL/L — LOW (ref 3.5–5.3)
POTASSIUM SERPL-SCNC: 3.3 MMOL/L — LOW (ref 3.5–5.3)
PROT SERPL-MCNC: 6.6 G/DL — SIGNIFICANT CHANGE UP (ref 6–8.3)
RBC # BLD: 2.43 M/UL — LOW (ref 4.2–5.8)
RBC # FLD: 16.9 % — HIGH (ref 10.3–14.5)
SAO2 % BLDA: 100 % — SIGNIFICANT CHANGE UP
SODIUM SERPL-SCNC: 145 MMOL/L — SIGNIFICANT CHANGE UP (ref 135–145)
WBC # BLD: 7.4 K/UL — SIGNIFICANT CHANGE UP (ref 3.8–10.5)
WBC # FLD AUTO: 7.4 K/UL — SIGNIFICANT CHANGE UP (ref 3.8–10.5)

## 2021-05-17 PROCEDURE — 71045 X-RAY EXAM CHEST 1 VIEW: CPT | Mod: 26

## 2021-05-17 PROCEDURE — 71045 X-RAY EXAM CHEST 1 VIEW: CPT | Mod: 26,77

## 2021-05-17 PROCEDURE — 74018 RADEX ABDOMEN 1 VIEW: CPT | Mod: 26

## 2021-05-17 RX ORDER — VANCOMYCIN HCL 1 G
VIAL (EA) INTRAVENOUS
Refills: 0 | Status: DISCONTINUED | OUTPATIENT
Start: 2021-05-17 | End: 2021-05-19

## 2021-05-17 RX ORDER — ACETAMINOPHEN 500 MG
1000 TABLET ORAL ONCE
Refills: 0 | Status: COMPLETED | OUTPATIENT
Start: 2021-05-17 | End: 2021-05-17

## 2021-05-17 RX ORDER — PIPERACILLIN AND TAZOBACTAM 4; .5 G/20ML; G/20ML
4.5 INJECTION, POWDER, LYOPHILIZED, FOR SOLUTION INTRAVENOUS EVERY 8 HOURS
Refills: 0 | Status: DISCONTINUED | OUTPATIENT
Start: 2021-05-17 | End: 2021-05-17

## 2021-05-17 RX ORDER — POTASSIUM CHLORIDE 20 MEQ
40 PACKET (EA) ORAL ONCE
Refills: 0 | Status: DISCONTINUED | OUTPATIENT
Start: 2021-05-17 | End: 2021-05-17

## 2021-05-17 RX ORDER — VANCOMYCIN HCL 1 G
1250 VIAL (EA) INTRAVENOUS EVERY 12 HOURS
Refills: 0 | Status: DISCONTINUED | OUTPATIENT
Start: 2021-05-17 | End: 2021-05-19

## 2021-05-17 RX ORDER — VANCOMYCIN HCL 1 G
1250 VIAL (EA) INTRAVENOUS ONCE
Refills: 0 | Status: COMPLETED | OUTPATIENT
Start: 2021-05-17 | End: 2021-05-17

## 2021-05-17 RX ORDER — METOCLOPRAMIDE HCL 10 MG
5 TABLET ORAL EVERY 8 HOURS
Refills: 0 | Status: DISCONTINUED | OUTPATIENT
Start: 2021-05-17 | End: 2021-05-20

## 2021-05-17 RX ORDER — PIPERACILLIN AND TAZOBACTAM 4; .5 G/20ML; G/20ML
4.5 INJECTION, POWDER, LYOPHILIZED, FOR SOLUTION INTRAVENOUS ONCE
Refills: 0 | Status: DISCONTINUED | OUTPATIENT
Start: 2021-05-17 | End: 2021-05-17

## 2021-05-17 RX ORDER — POTASSIUM CHLORIDE 20 MEQ
40 PACKET (EA) ORAL ONCE
Refills: 0 | Status: COMPLETED | OUTPATIENT
Start: 2021-05-17 | End: 2021-05-17

## 2021-05-17 RX ORDER — PIPERACILLIN AND TAZOBACTAM 4; .5 G/20ML; G/20ML
3.38 INJECTION, POWDER, LYOPHILIZED, FOR SOLUTION INTRAVENOUS ONCE
Refills: 0 | Status: COMPLETED | OUTPATIENT
Start: 2021-05-17 | End: 2021-05-17

## 2021-05-17 RX ORDER — POLYETHYLENE GLYCOL 3350 17 G/17G
17 POWDER, FOR SOLUTION ORAL DAILY
Refills: 0 | Status: DISCONTINUED | OUTPATIENT
Start: 2021-05-17 | End: 2021-05-18

## 2021-05-17 RX ORDER — MUPIROCIN 20 MG/G
1 OINTMENT TOPICAL
Refills: 0 | Status: DISCONTINUED | OUTPATIENT
Start: 2021-05-17 | End: 2021-05-22

## 2021-05-17 RX ORDER — PIPERACILLIN AND TAZOBACTAM 4; .5 G/20ML; G/20ML
3.38 INJECTION, POWDER, LYOPHILIZED, FOR SOLUTION INTRAVENOUS EVERY 8 HOURS
Refills: 0 | Status: DISCONTINUED | OUTPATIENT
Start: 2021-05-17 | End: 2021-05-21

## 2021-05-17 RX ADMIN — Medication 5 MILLIGRAM(S): at 23:03

## 2021-05-17 RX ADMIN — QUETIAPINE FUMARATE 75 MILLIGRAM(S): 200 TABLET, FILM COATED ORAL at 18:16

## 2021-05-17 RX ADMIN — Medication 1000 MILLIGRAM(S): at 01:56

## 2021-05-17 RX ADMIN — Medication 1000 MILLIGRAM(S): at 11:51

## 2021-05-17 RX ADMIN — Medication 1 TABLET(S): at 14:18

## 2021-05-17 RX ADMIN — POLYETHYLENE GLYCOL 3350 17 GRAM(S): 17 POWDER, FOR SOLUTION ORAL at 11:49

## 2021-05-17 RX ADMIN — GABAPENTIN 100 MILLIGRAM(S): 400 CAPSULE ORAL at 23:03

## 2021-05-17 RX ADMIN — Medication 0.2 MILLIGRAM(S): at 05:01

## 2021-05-17 RX ADMIN — Medication 40 MILLIEQUIVALENT(S): at 06:18

## 2021-05-17 RX ADMIN — PANTOPRAZOLE SODIUM 40 MILLIGRAM(S): 20 TABLET, DELAYED RELEASE ORAL at 11:51

## 2021-05-17 RX ADMIN — METHADONE HYDROCHLORIDE 10 MILLIGRAM(S): 40 TABLET ORAL at 05:01

## 2021-05-17 RX ADMIN — Medication 50 MILLILITER(S): at 05:03

## 2021-05-17 RX ADMIN — Medication 1000 MILLIGRAM(S): at 11:04

## 2021-05-17 RX ADMIN — Medication 50 MILLILITER(S): at 14:19

## 2021-05-17 RX ADMIN — PIPERACILLIN AND TAZOBACTAM 200 GRAM(S): 4; .5 INJECTION, POWDER, LYOPHILIZED, FOR SOLUTION INTRAVENOUS at 12:37

## 2021-05-17 RX ADMIN — Medication 400 MILLIGRAM(S): at 01:26

## 2021-05-17 RX ADMIN — GABAPENTIN 100 MILLIGRAM(S): 400 CAPSULE ORAL at 14:20

## 2021-05-17 RX ADMIN — Medication 30 MILLIGRAM(S): at 05:01

## 2021-05-17 RX ADMIN — Medication 3 MILLIGRAM(S): at 14:20

## 2021-05-17 RX ADMIN — Medication 50 MILLILITER(S): at 23:02

## 2021-05-17 RX ADMIN — Medication 250 MILLIGRAM(S): at 18:17

## 2021-05-17 RX ADMIN — Medication 1 DROP(S): at 05:03

## 2021-05-17 RX ADMIN — FENTANYL CITRATE 50 MICROGRAM(S): 50 INJECTION INTRAVENOUS at 18:30

## 2021-05-17 RX ADMIN — PIPERACILLIN AND TAZOBACTAM 25 GRAM(S): 4; .5 INJECTION, POWDER, LYOPHILIZED, FOR SOLUTION INTRAVENOUS at 14:18

## 2021-05-17 RX ADMIN — QUETIAPINE FUMARATE 75 MILLIGRAM(S): 200 TABLET, FILM COATED ORAL at 05:01

## 2021-05-17 RX ADMIN — GABAPENTIN 100 MILLIGRAM(S): 400 CAPSULE ORAL at 05:01

## 2021-05-17 RX ADMIN — FENTANYL CITRATE 50 MICROGRAM(S): 50 INJECTION INTRAVENOUS at 11:51

## 2021-05-17 RX ADMIN — Medication 2 MILLIGRAM(S): at 11:52

## 2021-05-17 RX ADMIN — FENTANYL CITRATE 50 MICROGRAM(S): 50 INJECTION INTRAVENOUS at 12:11

## 2021-05-17 RX ADMIN — Medication 1 APPLICATION(S): at 18:15

## 2021-05-17 RX ADMIN — METHADONE HYDROCHLORIDE 10 MILLIGRAM(S): 40 TABLET ORAL at 14:20

## 2021-05-17 RX ADMIN — Medication 0.2 MILLIGRAM(S): at 14:20

## 2021-05-17 RX ADMIN — Medication 10 MILLIGRAM(S): at 01:26

## 2021-05-17 RX ADMIN — Medication 250 MILLIGRAM(S): at 11:48

## 2021-05-17 RX ADMIN — Medication 400 MILLIGRAM(S): at 09:58

## 2021-05-17 RX ADMIN — Medication 5 MILLIGRAM(S): at 14:21

## 2021-05-17 RX ADMIN — ENOXAPARIN SODIUM 40 MILLIGRAM(S): 100 INJECTION SUBCUTANEOUS at 11:48

## 2021-05-17 RX ADMIN — PIPERACILLIN AND TAZOBACTAM 25 GRAM(S): 4; .5 INJECTION, POWDER, LYOPHILIZED, FOR SOLUTION INTRAVENOUS at 23:05

## 2021-05-17 RX ADMIN — Medication 3 MILLIGRAM(S): at 23:03

## 2021-05-17 RX ADMIN — CHLORHEXIDINE GLUCONATE 15 MILLILITER(S): 213 SOLUTION TOPICAL at 18:15

## 2021-05-17 RX ADMIN — CHLORHEXIDINE GLUCONATE 15 MILLILITER(S): 213 SOLUTION TOPICAL at 05:02

## 2021-05-17 RX ADMIN — POLYETHYLENE GLYCOL 3350 17 GRAM(S): 17 POWDER, FOR SOLUTION ORAL at 05:02

## 2021-05-17 RX ADMIN — METHADONE HYDROCHLORIDE 10 MILLIGRAM(S): 40 TABLET ORAL at 23:03

## 2021-05-17 RX ADMIN — Medication 1 APPLICATION(S): at 05:01

## 2021-05-17 RX ADMIN — Medication 10 MILLIGRAM(S): at 05:02

## 2021-05-17 RX ADMIN — Medication 2 MILLIGRAM(S): at 18:16

## 2021-05-17 RX ADMIN — CHLORHEXIDINE GLUCONATE 1 APPLICATION(S): 213 SOLUTION TOPICAL at 05:03

## 2021-05-17 RX ADMIN — FENTANYL CITRATE 50 MICROGRAM(S): 50 INJECTION INTRAVENOUS at 18:15

## 2021-05-17 RX ADMIN — Medication 3 MILLIGRAM(S): at 05:01

## 2021-05-17 NOTE — PROGRESS NOTE ADULT - SUBJECTIVE AND OBJECTIVE BOX
Patient is a 55y old  Male who presents with a chief complaint of SOB (16 May 2021 09:23)    pt seen in icu [ x ], reg med floor [   ], bed [ x ], chair at bedside [   ], awake and responsive [ ], mildly sedated, [ x ],    nad [x  ]        Allergies    No Known Allergies        Vitals    T(F): 100 (05-17-21 @ 04:15), Max: 101.7 (05-17-21 @ 00:45)  HR: 79 (05-17-21 @ 06:15) (69 - 146)  BP: 76/38 (05-17-21 @ 06:15) (68/35 - 163/83)  RR: 30 (05-17-21 @ 06:15) (21 - 42)  SpO2: 97% (05-17-21 @ 06:15) (95% - 100%)  Wt(kg): --  CAPILLARY BLOOD GLUCOSE      POCT Blood Glucose.: 102 mg/dL (17 May 2021 01:34)      Labs                          7.7    7.40  )-----------( 181      ( 17 May 2021 04:47 )             25.3       05-17    145  |  105  |  10  ----------------------------<  99  3.3<L>   |  40<H>  |  0.28<L>    Ca    8.6      17 May 2021 04:47  Phos  2.5     05-17  Mg     2.0     05-17    TPro  6.6  /  Alb  3.6  /  TBili  0.9  /  DBili  x   /  AST  30  /  ALT  48  /  AlkPhos  69  05-17            .Sputum Sputum  04-16 @ 04:42   Moderate Enterobacter aerogenes (Carbapenem Resistant)  Normal Respiratory Monserrat present  --  Enterobacter aerogenes (Carbapenem Resistant)      .Urine Clean Catch (Midstream)  03-15 @ 00:52   No growth  --  --          Radiology Results      Meds    MEDICATIONS  (STANDING):  albumin human 25% IVPB 50 milliLiter(s) IV Intermittent every 8 hours  ascorbic acid 1000 milliGRAM(s) Oral daily  BACItracin   Ointment 1 Application(s) Topical two times a day  chlorhexidine 0.12% Liquid 15 milliLiter(s) Oral Mucosa every 12 hours  chlorhexidine 2% Cloths 1 Application(s) Topical <User Schedule>  clonazePAM  Tablet 3 milliGRAM(s) Oral every 8 hours  cloNIDine 0.2 milliGRAM(s) Oral every 8 hours  dextrose 5% + sodium chloride 0.9%. 1000 milliLiter(s) (30 mL/Hr) IV Continuous <Continuous>  enoxaparin Injectable 40 milliGRAM(s) SubCutaneous every 24 hours  gabapentin 100 milliGRAM(s) Oral every 8 hours  insulin lispro (ADMELOG) corrective regimen sliding scale   SubCutaneous every 6 hours  methadone    Tablet 10 milliGRAM(s) Oral every 8 hours  metoclopramide   Syrup 10 milliGRAM(s) Oral every 6 hours  pantoprazole   Suspension 40 milliGRAM(s) Oral daily  phenylephrine    Infusion 0.9 MICROgram(s)/kG/Min (14.2 mL/Hr) IV Continuous <Continuous>  polyethylene glycol 3350 17 Gram(s) Oral two times a day  predniSONE   Tablet 30 milliGRAM(s) Oral daily  QUEtiapine 75 milliGRAM(s) Oral two times a day  trimethoprim  160 mG/sulfamethoxazole 800 mG 1 Tablet(s) Oral <User Schedule>      MEDICATIONS  (PRN):  acetaminophen    Suspension .. 650 milliGRAM(s) Oral every 12 hours PRN Temp greater or equal to 38C (100.4F)  ALBUTerol    90 MICROgram(s) HFA Inhaler 2 Puff(s) Inhalation every 6 hours PRN Shortness of Breath and/or Wheezing  artificial  tears Solution 1 Drop(s) Both EYES every 4 hours PRN Dry Eyes  fentaNYL    Injectable 50 MICROGram(s) IV Push every 6 hours PRN Severe Pain (7 - 10)  LORazepam   Injectable 2 milliGRAM(s) IV Push every 6 hours PRN Agitation  sodium chloride 0.9% lock flush 10 milliLiter(s) IV Push every 1 hour PRN Pre/post blood products, medications, blood draw, and to maintain line patency      Physical Exam    Neuro :  no focal deficits  Respiratory: CTA B/L  CV: RRR, S1S2, no murmurs,   Abdominal: Soft, NT, ND +BS,  Extremities: No edema, + peripheral pulses      ASSESSMENT      Neuro :  no focal deficits  Respiratory: CTA B/L  CV: RRR, S1S2, no murmurs,   Abdominal: Soft, NT, ND +BS, g- tube in place, dressing cdi  Extremities: b/l ue edema, + peripheral pulses      ASSESSMENT    Hypoxemia 2nd to covid pna   transaminitis  prediabetes  h/o appendectomy  cholecystectomy        PLAN    cont cont precautions  covid 5/14/21 neg noted above  d/c remdesevir given covid ab positive noted   completed dexamethasone   started pulse steroids for 3 days - 250mg solumedrol bid now tapered off 4/14/21   C/w prednisone 30 daily    cont on bactrim empirically, TIW   cont asa, vit c,    cont albuterol inhaler   pulm f/u  procalcitonin, D-dimer, crp, ldh, ferritin, lactate noted ,    cont tylenol prn,   cont robitussin prn   pt on precedex drip    pt methadone 30 q8   pain mgmt eval noted   Pt may have had benzo withdrawal as well as opioid withdrawal causing those symptoms.    Would dc fentanyl and give hydromorphone po as prn breakthrough.   pt restarted on phenylephrine drip for hypotension  hold clonidine   s/p intubation 3/29/21   s/p tracheostomy 4/21/21  O2 sat 100% (95% - 100%) mv 30%  O2 via mech vent and taper fio2 as tolerated   vent mgmt as per icu  xray 3/19/21 with pneumomediastinum  rept cxr with New trace right apical pneumothorax. New mild left apical pneumothorax. Grossly stable small pneumomediastinum.  Soft tissue emphysema at the neck bases bilaterally. Grossly stable bilateral pulmonary infiltrates noted.   cxr 2/24 with No evidence of pneumothorax can be appreciated on the available image. This may be related to patient positioning. Evidence of pneumomediastinum and subcutaneous emphysema in the lower neck is again   noted. There are patchy bibasilar infiltrates and elevated right hemidiaphragm noted.   cxr 3/29/21 with No significant change bilateral infiltrates. There is a small simple left apical pneumothorax. No significant pleural effusion. Bilateral subcutaneous emphysema similar to prior.   XRs on 7AM and 5PM with no obvious increase of ptx  cxr 4/4/21 with Improving bilateral airspace disease noted    cxr 4/8/21 with Small left pneumothorax noted.  thoracic surg f/u   s/p L chest tube replacement 4/18/21 for recurrence of left pneumothorax   cxr 4/20/21 with Unchanged advanced infiltrates and catheter left chest tube noted   CXR 4/11/21 with : No interval change compared to one day prior. No pneumothorax noted.   unable to flush or aspirate tube fully, noted debris in tubing which was milked out.   Once material removed from tubing able to aspirated and flush fully. Also changed dressing on pigtail which appears to be in good position.   Monitor O2 status   s/p tracheostomy 4/21/21   stay sutures out 2 weeks from OR date  cxr 4/21/21 with No evidence of active chest disease. Tracheostomy tube in place otherwise no significant change noted   cxr 4/25/21 with A 40% LEFT pneumothorax. LEFT multi-sidehole pigtail catheter overlies LEFT lower hemithorax.. Bilateral multifocal and diffuse ill-defined airspace opacities..  Follow-up AP portable chest radiograph 4/25/2021 AT 8:58 AM: Residual LEFT 30% upper zone pneumothorax. Otherwise no interval change.noted    cxr 4/30/21 Tracheostomy tube again noted. Left chest tube noted with small left apical pneumothorax unchanged. Bilateral airspace opacities overall worsening. Heart size cannot be accurately assessed in this projection noted.   cxr 5/3/21 with Large left pneumothorax noted above.   cxr 5 4/21 with No significant interval change noted above.   ct chest with Extensive chronic appearing consolidative opacities throughout the lungs, most prominent in the left lower lobe and lingula, with bronchiectasis, scarring, and volume loss. Additional scattered peripheral coarse opacities.   Mild left pneumothorax. Left lateral pleural space pigtail catheter, not in continuity with the pneumothorax. Mild bilateral hydronephrosis, similar to appearance on CT of the abdomen and pelvis on April 27. noted above   s/p 2nd chest tube 5/5/21  cxr 5/6/21 with Tracheostomy and catheter left chest tubes remain. , There are significant diffuse advanced infiltrates again noted. No pneumothorax. Above findings are similar to study earlier in the day. Present film shows a left jugular line inserted   with tip entering the superior vena cava noted   cxr 5/11/21 with Heart magnified by technique. Tracheostomy, left jugular line, and 2 catheter left chest tubes remain. Quite advanced infiltration in the lungs particularly in the left lower lobe again noted.  There is a persistent rather small left apical pneumothorax. Chest is similar to May 10 noted    cxt 5/14/21 with Perihilar diffuse airspace disease and LEFT lower lobe retrocardiac airspace consolidation. LEFT chest tube overlies upper zone. Small LEFT apical less than 5% pneumothorax noted above  s/p surgical placement of gastrostomy tube 4/23/2021.   abd xray 5/10/21 with ileus noted   dressing changed daily for seroma  tube feeding resumed   gastrostomy put to suction   CT scan of the chest 5/13/21 demonstrates diffuse bilateral infiltrates with a region of consolidation at the left lung base. Small left pneumothorax. 2 left chest tubes noted in place noted above.  CT scan of the abdomen and pelvis 5/13/21 demonstrates diffuse dilatation of small and large bowel loops most consistent with ileus noted   xray abd with  5/14/21 with Ingested contrast within nonobstructed large bowel to the level of rectum. No acute radiographic intra-abdominal findings noted above.  id f/u   No need for further antibiotics as patient completed course of Meropenem  leukocytosis resolved  sputum cx with Enterobacter aerogenes (Carbapenem Resistant) noted above   tmx 100.9  ua positive    andrea changed  Completed ABx Zosyn, meropenem, s/p 1 dose of Vancomycin  lispro ss   prognosis poor   s/p 4 units prbc for anemia  f/u h/h    transfuse prbc as needed  check vitamin b12  heme onc f/u  retic is elevated.    Haptoglobin normal  ? daily phlebotomy  no hemolysis, Fe/B12/folate adequate  hemolysis is unlikely even his baseline haptoglobin may be very elevated due to COVID  direct pamella neg noted    supplement potassium as needed for hypokalemia  cont current meds         Patient is a 55y old  Male who presents with a chief complaint of SOB (16 May 2021 09:23)    pt seen in icu [ x ], reg med floor [   ], bed [ x ], chair at bedside [   ], awake and responsive [ ], mildly sedated, [ x ],    nad [x  ]        Allergies    No Known Allergies        Vitals    T(F): 100 (05-17-21 @ 04:15), Max: 101.7 (05-17-21 @ 00:45)  HR: 79 (05-17-21 @ 06:15) (69 - 146)  BP: 76/38 (05-17-21 @ 06:15) (68/35 - 163/83)  RR: 30 (05-17-21 @ 06:15) (21 - 42)  SpO2: 97% (05-17-21 @ 06:15) (95% - 100%)  Wt(kg): --  CAPILLARY BLOOD GLUCOSE      POCT Blood Glucose.: 102 mg/dL (17 May 2021 01:34)      Labs                          7.7    7.40  )-----------( 181      ( 17 May 2021 04:47 )             25.3       05-17    145  |  105  |  10  ----------------------------<  99  3.3<L>   |  40<H>  |  0.28<L>    Ca    8.6      17 May 2021 04:47  Phos  2.5     05-17  Mg     2.0     05-17    TPro  6.6  /  Alb  3.6  /  TBili  0.9  /  DBili  x   /  AST  30  /  ALT  48  /  AlkPhos  69  05-17            .Sputum Sputum  04-16 @ 04:42   Moderate Enterobacter aerogenes (Carbapenem Resistant)  Normal Respiratory Monserrat present  --  Enterobacter aerogenes (Carbapenem Resistant)      .Urine Clean Catch (Midstream)  03-15 @ 00:52   No growth  --  --          Radiology Results      Meds    MEDICATIONS  (STANDING):  albumin human 25% IVPB 50 milliLiter(s) IV Intermittent every 8 hours  ascorbic acid 1000 milliGRAM(s) Oral daily  BACItracin   Ointment 1 Application(s) Topical two times a day  chlorhexidine 0.12% Liquid 15 milliLiter(s) Oral Mucosa every 12 hours  chlorhexidine 2% Cloths 1 Application(s) Topical <User Schedule>  clonazePAM  Tablet 3 milliGRAM(s) Oral every 8 hours  cloNIDine 0.2 milliGRAM(s) Oral every 8 hours  dextrose 5% + sodium chloride 0.9%. 1000 milliLiter(s) (30 mL/Hr) IV Continuous <Continuous>  enoxaparin Injectable 40 milliGRAM(s) SubCutaneous every 24 hours  gabapentin 100 milliGRAM(s) Oral every 8 hours  insulin lispro (ADMELOG) corrective regimen sliding scale   SubCutaneous every 6 hours  methadone    Tablet 10 milliGRAM(s) Oral every 8 hours  metoclopramide   Syrup 10 milliGRAM(s) Oral every 6 hours  pantoprazole   Suspension 40 milliGRAM(s) Oral daily  phenylephrine    Infusion 0.9 MICROgram(s)/kG/Min (14.2 mL/Hr) IV Continuous <Continuous>  polyethylene glycol 3350 17 Gram(s) Oral two times a day  predniSONE   Tablet 30 milliGRAM(s) Oral daily  QUEtiapine 75 milliGRAM(s) Oral two times a day  trimethoprim  160 mG/sulfamethoxazole 800 mG 1 Tablet(s) Oral <User Schedule>      MEDICATIONS  (PRN):  acetaminophen    Suspension .. 650 milliGRAM(s) Oral every 12 hours PRN Temp greater or equal to 38C (100.4F)  ALBUTerol    90 MICROgram(s) HFA Inhaler 2 Puff(s) Inhalation every 6 hours PRN Shortness of Breath and/or Wheezing  artificial  tears Solution 1 Drop(s) Both EYES every 4 hours PRN Dry Eyes  fentaNYL    Injectable 50 MICROGram(s) IV Push every 6 hours PRN Severe Pain (7 - 10)  LORazepam   Injectable 2 milliGRAM(s) IV Push every 6 hours PRN Agitation  sodium chloride 0.9% lock flush 10 milliLiter(s) IV Push every 1 hour PRN Pre/post blood products, medications, blood draw, and to maintain line patency      Physical Exam    Neuro :  no focal deficits  Respiratory: CTA B/L  CV: RRR, S1S2, no murmurs,   Abdominal: Soft, NT, ND +BS,  Extremities: No edema, + peripheral pulses      ASSESSMENT      Neuro :  no focal deficits  Respiratory: CTA B/L  CV: RRR, S1S2, no murmurs,   Abdominal: Soft, NT, ND +BS, g- tube in place, dressing cdi  Extremities: b/l ue edema, + peripheral pulses      ASSESSMENT    Hypoxemia 2nd to covid pna   transaminitis  prediabetes  h/o appendectomy  cholecystectomy        PLAN    cont cont precautions  covid 5/14/21 neg noted above  d/c remdesevir given covid ab positive noted   completed dexamethasone   started pulse steroids for 3 days - 250mg solumedrol bid now tapered off 4/14/21   C/w prednisone 30 daily    cont on bactrim empirically, TIW   cont asa, vit c,    cont albuterol inhaler   pulm f/u  procalcitonin, D-dimer, crp, ldh, ferritin, lactate noted ,    cont tylenol prn,   cont robitussin prn   pt off precedex    pt methadone 30 q8   pain mgmt eval noted   Pt may have had benzo withdrawal as well as opioid withdrawal causing those symptoms.    Would dc fentanyl and give hydromorphone po as prn breakthrough.   cont phenylephrine drip for hypotension  hold clonidine   s/p intubation 3/29/21   s/p tracheostomy 4/21/21  O2 sat 97% (95% - 100%) mv 40%  O2 via mech vent and taper fio2 as tolerated   vent mgmt as per icu  xray 3/19/21 with pneumomediastinum  rept cxr with New trace right apical pneumothorax. New mild left apical pneumothorax. Grossly stable small pneumomediastinum.  Soft tissue emphysema at the neck bases bilaterally. Grossly stable bilateral pulmonary infiltrates noted.   cxr 2/24 with No evidence of pneumothorax can be appreciated on the available image. This may be related to patient positioning. Evidence of pneumomediastinum and subcutaneous emphysema in the lower neck is again   noted. There are patchy bibasilar infiltrates and elevated right hemidiaphragm noted.   cxr 3/29/21 with No significant change bilateral infiltrates. There is a small simple left apical pneumothorax. No significant pleural effusion. Bilateral subcutaneous emphysema similar to prior.   XRs on 7AM and 5PM with no obvious increase of ptx  cxr 4/4/21 with Improving bilateral airspace disease noted    cxr 4/8/21 with Small left pneumothorax noted.  thoracic surg f/u   s/p L chest tube replacement 4/18/21 for recurrence of left pneumothorax   cxr 4/20/21 with Unchanged advanced infiltrates and catheter left chest tube noted   CXR 4/11/21 with : No interval change compared to one day prior. No pneumothorax noted.   unable to flush or aspirate tube fully, noted debris in tubing which was milked out.   Once material removed from tubing able to aspirated and flush fully. Also changed dressing on pigtail which appears to be in good position.   Monitor O2 status   s/p tracheostomy 4/21/21   stay sutures out 2 weeks from OR date  cxr 4/21/21 with No evidence of active chest disease. Tracheostomy tube in place otherwise no significant change noted   cxr 4/25/21 with A 40% LEFT pneumothorax. LEFT multi-sidehole pigtail catheter overlies LEFT lower hemithorax.. Bilateral multifocal and diffuse ill-defined airspace opacities..  Follow-up AP portable chest radiograph 4/25/2021 AT 8:58 AM: Residual LEFT 30% upper zone pneumothorax. Otherwise no interval change.noted    cxr 4/30/21 Tracheostomy tube again noted. Left chest tube noted with small left apical pneumothorax unchanged. Bilateral airspace opacities overall worsening. Heart size cannot be accurately assessed in this projection noted.   cxr 5/3/21 with Large left pneumothorax noted above.   cxr 5 4/21 with No significant interval change noted above.   ct chest with Extensive chronic appearing consolidative opacities throughout the lungs, most prominent in the left lower lobe and lingula, with bronchiectasis, scarring, and volume loss. Additional scattered peripheral coarse opacities.   Mild left pneumothorax. Left lateral pleural space pigtail catheter, not in continuity with the pneumothorax. Mild bilateral hydronephrosis, similar to appearance on CT of the abdomen and pelvis on April 27. noted above   s/p 2nd chest tube 5/5/21  cxr 5/6/21 with Tracheostomy and catheter left chest tubes remain. , There are significant diffuse advanced infiltrates again noted. No pneumothorax. Above findings are similar to study earlier in the day. Present film shows a left jugular line inserted   with tip entering the superior vena cava noted   cxr 5/11/21 with Heart magnified by technique. Tracheostomy, left jugular line, and 2 catheter left chest tubes remain. Quite advanced infiltration in the lungs particularly in the left lower lobe again noted.  There is a persistent rather small left apical pneumothorax. Chest is similar to May 10 noted    cxt 5/14/21 with Perihilar diffuse airspace disease and LEFT lower lobe retrocardiac airspace consolidation. LEFT chest tube overlies upper zone. Small LEFT apical less than 5% pneumothorax noted above  s/p surgical placement of gastrostomy tube 4/23/2021.   abd xray 5/10/21 with ileus noted   dressing changed daily for seroma  tube feeding resumed   gastrostomy put to suction   CT scan of the chest 5/13/21 demonstrates diffuse bilateral infiltrates with a region of consolidation at the left lung base. Small left pneumothorax. 2 left chest tubes noted in place noted above.  CT scan of the abdomen and pelvis 5/13/21 demonstrates diffuse dilatation of small and large bowel loops most consistent with ileus noted   xray abd with  5/14/21 with Ingested contrast within nonobstructed large bowel to the level of rectum. No acute radiographic intra-abdominal findings noted above.  id f/u   No need for further antibiotics as patient completed course of Meropenem  leukocytosis resolved  sputum cx with Enterobacter aerogenes (Carbapenem Resistant) noted above   tmx 101.7   ua positive    andrea changed  Completed ABx Zosyn, meropenem, s/p 1 dose of Vancomycin  lispro ss   prognosis poor   s/p 4 units prbc for anemia  f/u h/h    transfuse prbc as needed  check vitamin b12  heme onc f/u  retic is elevated.    Haptoglobin normal  ? daily phlebotomy  no hemolysis, Fe/B12/folate adequate  hemolysis is unlikely even his baseline haptoglobin may be very elevated due to COVID  direct pamella neg noted    supplement potassium as needed for hypokalemia  cont current meds

## 2021-05-17 NOTE — PROGRESS NOTE ADULT - ATTENDING COMMENTS
55 yr old  man , non smoker with  moody 1990s presented 3/14 with x9 days worsening cough, subjective fevers, and SOB, with x2-3 days dysuria and central, non-radiating, constant CP. Admitted to medicine unit  for acute hypoxic respiratory failure secondary to pna from covid-19 infection .     Assessment:  1. Acute hypoxic respiratory failure  2. Covid-19 infection   3. Transaminitis  4. Prediabetes  5. Bilateral pneumothorax  6. Septic shock - resolved  7. Anemia  8. bowel obstruction/ileus    Plan     - persistent left apical PTX  -S/p tracheostomy placement 4/21   -S/p G-tube 4/23  -REGGIE improved, holding tubes for bowel rest, cont bowel regimen  -Cont. mechanical ventilation   -Daily weaning trials  - apical chest tube to suction, base CT on waterseal  - tapering steroids  - wean sedation  -PT evaluation  -resume trickle feed   -dvt/gi prophy  -hemodynamic monitoring   -d/c TLC  -Prognosis is guarded .

## 2021-05-17 NOTE — PROGRESS NOTE ADULT - SUBJECTIVE AND OBJECTIVE BOX
INTERVAL HPI/OVERNIGHT EVENTS: Patient spiked temperature overnight. Ua was negative, was given tylenol.    PRESSORS: [x ] YES [ ] NO  WHICH:pheny    ANTIBIOTICS:                      Antimicrobial:  trimethoprim  160 mG/sulfamethoxazole 800 mG 1 Tablet(s) Oral <User Schedule>    Cardiovascular:  cloNIDine 0.2 milliGRAM(s) Oral every 8 hours  phenylephrine    Infusion 0.9 MICROgram(s)/kG/Min IV Continuous <Continuous>    Pulmonary:  ALBUTerol    90 MICROgram(s) HFA Inhaler 2 Puff(s) Inhalation every 6 hours PRN    Hematalogic:  enoxaparin Injectable 40 milliGRAM(s) SubCutaneous every 24 hours    Other:  acetaminophen    Suspension .. 650 milliGRAM(s) Oral every 12 hours PRN  albumin human 25% IVPB 50 milliLiter(s) IV Intermittent every 8 hours  artificial  tears Solution 1 Drop(s) Both EYES every 4 hours PRN  ascorbic acid 1000 milliGRAM(s) Oral daily  BACItracin   Ointment 1 Application(s) Topical two times a day  chlorhexidine 0.12% Liquid 15 milliLiter(s) Oral Mucosa every 12 hours  chlorhexidine 2% Cloths 1 Application(s) Topical <User Schedule>  clonazePAM  Tablet 3 milliGRAM(s) Oral every 8 hours  dextrose 5% + sodium chloride 0.9%. 1000 milliLiter(s) IV Continuous <Continuous>  fentaNYL    Injectable 50 MICROGram(s) IV Push every 6 hours PRN  gabapentin 100 milliGRAM(s) Oral every 8 hours  insulin lispro (ADMELOG) corrective regimen sliding scale   SubCutaneous every 6 hours  LORazepam   Injectable 2 milliGRAM(s) IV Push every 6 hours PRN  methadone    Tablet 10 milliGRAM(s) Oral every 8 hours  metoclopramide   Syrup 10 milliGRAM(s) Oral every 6 hours  pantoprazole   Suspension 40 milliGRAM(s) Oral daily  polyethylene glycol 3350 17 Gram(s) Oral two times a day  predniSONE   Tablet 30 milliGRAM(s) Oral daily  QUEtiapine 75 milliGRAM(s) Oral two times a day  sodium chloride 0.9% lock flush 10 milliLiter(s) IV Push every 1 hour PRN    acetaminophen    Suspension .. 650 milliGRAM(s) Oral every 12 hours PRN  albumin human 25% IVPB 50 milliLiter(s) IV Intermittent every 8 hours  ALBUTerol    90 MICROgram(s) HFA Inhaler 2 Puff(s) Inhalation every 6 hours PRN  artificial  tears Solution 1 Drop(s) Both EYES every 4 hours PRN  ascorbic acid 1000 milliGRAM(s) Oral daily  BACItracin   Ointment 1 Application(s) Topical two times a day  chlorhexidine 0.12% Liquid 15 milliLiter(s) Oral Mucosa every 12 hours  chlorhexidine 2% Cloths 1 Application(s) Topical <User Schedule>  clonazePAM  Tablet 3 milliGRAM(s) Oral every 8 hours  cloNIDine 0.2 milliGRAM(s) Oral every 8 hours  dextrose 5% + sodium chloride 0.9%. 1000 milliLiter(s) IV Continuous <Continuous>  enoxaparin Injectable 40 milliGRAM(s) SubCutaneous every 24 hours  fentaNYL    Injectable 50 MICROGram(s) IV Push every 6 hours PRN  gabapentin 100 milliGRAM(s) Oral every 8 hours  insulin lispro (ADMELOG) corrective regimen sliding scale   SubCutaneous every 6 hours  LORazepam   Injectable 2 milliGRAM(s) IV Push every 6 hours PRN  methadone    Tablet 10 milliGRAM(s) Oral every 8 hours  metoclopramide   Syrup 10 milliGRAM(s) Oral every 6 hours  pantoprazole   Suspension 40 milliGRAM(s) Oral daily  phenylephrine    Infusion 0.9 MICROgram(s)/kG/Min IV Continuous <Continuous>  polyethylene glycol 3350 17 Gram(s) Oral two times a day  predniSONE   Tablet 30 milliGRAM(s) Oral daily  QUEtiapine 75 milliGRAM(s) Oral two times a day  sodium chloride 0.9% lock flush 10 milliLiter(s) IV Push every 1 hour PRN  trimethoprim  160 mG/sulfamethoxazole 800 mG 1 Tablet(s) Oral <User Schedule>    Drug Dosing Weight  Height (cm): 167.6 (2021 23:15)  Weight (kg): 83.9 (2021 23:15)  BMI (kg/m2): 29.9 (2021 23:15)  BSA (m2): 1.93 (2021 23:15)    CENTRAL LINE: [ x] YES [ ] NO  LOCATION:   DATE INSERTED:  REMOVE: [ ] YES [ ] NO  EXPLAIN:    RIVERA: x[x ] YES [ ] NO    DATE INSERTED:  REMOVE:  [ ] YES [ ] NO  EXPLAIN:    A-LINE:  [ ] YES [ ] NO  LOCATION:   DATE INSERTED:  REMOVE:  [ ] YES [ ] NO  EXPLAIN:    PMH -reviewed admission note, no change since admission    ICU Vital Signs Last 24 Hrs  T(C): 37.8 (17 May 2021 04:15), Max: 38.7 (17 May 2021 00:45)  T(F): 100 (17 May 2021 04:15), Max: 101.7 (17 May 2021 00:45)  HR: 56 (17 May 2021 07:00) (56 - 146)  BP: 153/90 (17 May 2021 07:00) (68/35 - 163/83)  BP(mean): 105 (17 May 2021 07:00) (42 - 108)  ABP: --  ABP(mean): --  RR: 30 (17 May 2021 07:00) (21 - 42)  SpO2: 100% (17 May 2021 07:00) (95% - 100%)      ABG - ( 17 May 2021 03:50 )  pH, Arterial: 7.48  pH, Blood: x     /  pCO2: 57    /  pO2: 158   / HCO3: 42    / Base Excess: 16.0  /  SaO2: 100                   05-16 @ 07:01  -  05-17 @ 07:00  --------------------------------------------------------  IN: 2250.6 mL / OUT: 1452 mL / NET: 798.6 mL        Mode: AC/ CMV (Assist Control/ Continuous Mandatory Ventilation)  RR (machine): 30  TV (machine): 400  FiO2: 40  PEEP: 5  ITime: 0.7  MAP: 16  PIP: 38      PHYSICAL EXAM:  GENERAL: Patient seen resting in bed and in no apparent distress   HEAD: Atraumatic, Normocephalic  EYES: PERRL, conjunctiva and sclera clear  NECK: Supple, normal appearance   NERVOUS SYSTEM:  Awake + encephalopathy  Moving all 4 extremities  CHEST/LUNx chest tube on L. No chest deformity; Crackles b/l. No, rhonchi, wheezing   HEART: Tachycardic Regular rhythm; No murmurs, rubs, or gallops  ABDOMEN: PEG in place. Soft, Nontender, Nondistended; Bowel sounds present + PEG  EXTREMITIES:  Mild dependent edema. R arm edema and ecchymosis.  Peripheral Pulses intact, No clubbing, cyanosis,   SKIN: Intact, No rashes or lesions  Rivera with dark brown urine      LABS:  CBC Full  -  ( 17 May 2021 04:47 )  WBC Count : 7.40 K/uL  RBC Count : 2.43 M/uL  Hemoglobin : 7.7 g/dL  Hematocrit : 25.3 %  Platelet Count - Automated : 181 K/uL  Mean Cell Volume : 104.1 fl  Mean Cell Hemoglobin : 31.7 pg  Mean Cell Hemoglobin Concentration : 30.4 gm/dL  Auto Neutrophil # : x  Auto Lymphocyte # : x  Auto Monocyte # : x  Auto Eosinophil # : x  Auto Basophil # : x  Auto Neutrophil % : x  Auto Lymphocyte % : x  Auto Monocyte % : x  Auto Eosinophil % : x  Auto Basophil % : x        145  |  105  |  10  ----------------------------<  99  3.3<L>   |  40<H>  |  0.28<L>    Ca    8.6      17 May 2021 04:47  Phos  2.5       Mg     2.0         TPro  6.6  /  Alb  3.6  /  TBili  0.9  /  DBili  x   /  AST  30  /  ALT  48  /  AlkPhos  69        Urinalysis Basic - ( 16 May 2021 19:14 )    Color: Shayla / Appearance: very cloudy / S.025 / pH: x  Gluc: x / Ketone: Negative  / Bili: Negative / Urobili: 4   Blood: x / Protein: 100 / Nitrite: Negative   Leuk Esterase: Trace / RBC: >50 /HPF / WBC 3-5 /HPF   Sq Epi: x / Non Sq Epi: Occasional /HPF / Bacteria: Few /HPF          RADIOLOGY & ADDITIONAL STUDIES REVIEWED:      GOALS OF CARE DISCUSSION WITH PATIENT/FAMILY/PROXY:    CRITICAL CARE TIME SPENT: 35 minutes

## 2021-05-17 NOTE — PROGRESS NOTE ADULT - PROBLEM SELECTOR PLAN 1
isolation precautions DC  Cont mechanical ventilation - S.P trach.   Wean as tolerated  Tracheal care  Pulmonary toilet  Decubitus prevention  Supplemental nutrition  ICU management.   Follow up ABGs  Monitor oxygen sat  Monitor LFT, LDH, CRP, D-Dimer, Ferritin and procalcitonin  Vit C, D and zinc supp  Montelukast 10 mgs po Qhs  Daily CXR   G-tube with feeds  Replace lytes  Pulmonary toilet  DVT and GI PPX.

## 2021-05-17 NOTE — PROGRESS NOTE ADULT - SUBJECTIVE AND OBJECTIVE BOX
Patient is a 55y old  Male who presents with a chief complaint of SOB (17 May 2021 07:45)  Patient is awake, not alert, laying in bed in NAD. Still on ventilator via trach.    INTERVAL HPI/OVERNIGHT EVENTS:      VITAL SIGNS:  T(F): 101.1 (21 @ 09:00)  HR: 75 (21 @ 09:30)  BP: 111/62 (21 @ 09:30)  RR: 26 (21 @ 09:30)  SpO2: 100% (21 @ 09:30)  Wt(kg): --  I&O's Detail    16 May 2021 07:01  -  17 May 2021 07:00  --------------------------------------------------------  IN:    Albumin 5%  - 250 mL: 200 mL    dextrose 5% + sodium chloride 0.9%: 690 mL    Enteral Tube Flush: 310 mL    IV PiggyBack: 100 mL    IV PiggyBack: 100 mL    IV PiggyBack: 499.8 mL    Jevity 1.5: 230 mL    Phenylephrine: 120.8 mL  Total IN: 2250.6 mL    OUT:    Chest Tube (mL): 0 mL    Chest Tube (mL): 0 mL    Indwelling Catheter - Urethral (mL): 1052 mL    Rectal Tube (mL): 400 mL  Total OUT: 1452 mL    Total NET: 798.6 mL        Mode: AC/ CMV (Assist Control/ Continuous Mandatory Ventilation)  RR (machine): 32  TV (machine): 400  FiO2: 40  PEEP: 5  ITime: 1  MAP: 12  PIP: 36        REVIEW OF SYSTEMS:    CONSTITUTIONAL:  No fevers, chills, sweats    HEENT:  Eyes:  No diplopia or blurred vision. ENT:  No earache, sore throat or runny nose.    CARDIOVASCULAR:  No pressure, squeezing, tightness, or heaviness about the chest; no palpitations.    RESPIRATORY:  Per HPI    GASTROINTESTINAL:  No abdominal pain, nausea, vomiting or diarrhea.    GENITOURINARY:  No dysuria, frequency or urgency.    NEUROLOGIC:  No paresthesias, fasciculations, seizures or weakness.    PSYCHIATRIC:  No disorder of thought or mood.      PHYSICAL EXAM:    Constitutional: Well developed and nourished  Eyes:Perrla  ENMT: normal  Neck:supple  Respiratory: good air entry  Cardiovascular: S1 S2 regular  Gastrointestinal: Soft, Non tender  Extremities: No edema  Vascular:normal  Neurological:Awake, not alert  Musculoskeletal:Normal      MEDICATIONS  (STANDING):  albumin human 25% IVPB 50 milliLiter(s) IV Intermittent every 8 hours  ascorbic acid 1000 milliGRAM(s) Oral daily  BACItracin   Ointment 1 Application(s) Topical two times a day  chlorhexidine 0.12% Liquid 15 milliLiter(s) Oral Mucosa every 12 hours  chlorhexidine 2% Cloths 1 Application(s) Topical <User Schedule>  clonazePAM  Tablet 3 milliGRAM(s) Oral every 8 hours  cloNIDine 0.2 milliGRAM(s) Oral every 8 hours  dextrose 5% + sodium chloride 0.9%. 1000 milliLiter(s) (30 mL/Hr) IV Continuous <Continuous>  enoxaparin Injectable 40 milliGRAM(s) SubCutaneous every 24 hours  gabapentin 100 milliGRAM(s) Oral every 8 hours  insulin lispro (ADMELOG) corrective regimen sliding scale   SubCutaneous every 6 hours  methadone    Tablet 10 milliGRAM(s) Oral every 8 hours  metoclopramide   Syrup 5 milliGRAM(s) Oral every 8 hours  pantoprazole   Suspension 40 milliGRAM(s) Oral daily  phenylephrine    Infusion 0.9 MICROgram(s)/kG/Min (14.2 mL/Hr) IV Continuous <Continuous>  piperacillin/tazobactam IVPB. 4.5 Gram(s) IV Intermittent once  piperacillin/tazobactam IVPB.. 4.5 Gram(s) IV Intermittent every 8 hours  polyethylene glycol 3350 17 Gram(s) Oral daily  predniSONE   Tablet 30 milliGRAM(s) Oral daily  QUEtiapine 75 milliGRAM(s) Oral two times a day  trimethoprim  160 mG/sulfamethoxazole 800 mG 1 Tablet(s) Oral <User Schedule>  vancomycin  IVPB      vancomycin  IVPB 1250 milliGRAM(s) IV Intermittent once  vancomycin  IVPB 1250 milliGRAM(s) IV Intermittent every 12 hours    MEDICATIONS  (PRN):  acetaminophen    Suspension .. 650 milliGRAM(s) Oral every 12 hours PRN Temp greater or equal to 38C (100.4F)  ALBUTerol    90 MICROgram(s) HFA Inhaler 2 Puff(s) Inhalation every 6 hours PRN Shortness of Breath and/or Wheezing  artificial  tears Solution 1 Drop(s) Both EYES every 4 hours PRN Dry Eyes  fentaNYL    Injectable 50 MICROGram(s) IV Push every 6 hours PRN Severe Pain (7 - 10)  LORazepam   Injectable 2 milliGRAM(s) IV Push every 6 hours PRN Agitation  sodium chloride 0.9% lock flush 10 milliLiter(s) IV Push every 1 hour PRN Pre/post blood products, medications, blood draw, and to maintain line patency      Allergies    No Known Allergies    Intolerances        LABS:                        7.7    7.40  )-----------( 181      ( 17 May 2021 04:47 )             25.3         145  |  105  |  10  ----------------------------<  99  3.3<L>   |  40<H>  |  0.28<L>    Ca    8.6      17 May 2021 04:47  Phos  2.5       Mg     2.0         TPro  6.6  /  Alb  3.6  /  TBili  0.9  /  DBili  x   /  AST  30  /  ALT  48  /  AlkPhos  69        Urinalysis Basic - ( 16 May 2021 19:14 )    Color: Shayla / Appearance: very cloudy / S.025 / pH: x  Gluc: x / Ketone: Negative  / Bili: Negative / Urobili: 4   Blood: x / Protein: 100 / Nitrite: Negative   Leuk Esterase: Trace / RBC: >50 /HPF / WBC 3-5 /HPF   Sq Epi: x / Non Sq Epi: Occasional /HPF / Bacteria: Few /HPF      ABG - ( 17 May 2021 03:50 )  pH, Arterial: 7.48  pH, Blood: x     /  pCO2: 57    /  pO2: 158   / HCO3: 42    / Base Excess: 16.0  /  SaO2: 100                   CAPILLARY BLOOD GLUCOSE      POCT Blood Glucose.: 102 mg/dL (17 May 2021 01:34)  POCT Blood Glucose.: 121 mg/dL (16 May 2021 17:26)  POCT Blood Glucose.: 152 mg/dL (16 May 2021 12:34)        RADIOLOGY & ADDITIONAL TESTS:    CXR:  xr< from: Xray Chest 1 View- PORTABLE-Routine (Xray Chest 1 View- PORTABLE-Routine in AM.) (21 @ 08:50) >  FINDINGS/  IMPRESSION:  Study limited due to rotation. Left chest tubes again noted. Stable small left apical pneumothorax. Left central line and tracheostomy tube again noted.    Airspace opacities left lung mildly progressed.    Heart size cannot be accurately assessed in this projection.    < end of copied text >    Ct scan chest:    ekg;    echo:

## 2021-05-17 NOTE — CHART NOTE - NSCHARTNOTEFT_GEN_A_CORE
Left IJ line was removed under aseptic conditions. Pressure was applied. Patient tolerated well. No excess bleeding. Left IJ line was removed under aseptic conditions. Pressure was applied. Patient tolerated well. No excess bleeding...

## 2021-05-17 NOTE — PROGRESS NOTE ADULT - ASSESSMENT
Assessment and plan: 56 y/o M with no PMH is admitted to ICU for AHRF 2/2 covid19 pna     1. Acute hypoxic respiratory failure  2. ARDS 2/2 Covid pneumonia  3. Pneumothorax  4. Prediabetes  5. Abnormal TSH     Neuro  -Alert and oriented x 3 at baseline   -Off all drips overnight, and calm  -C/w Klonopin to 3mg q8h  -C/w Seroquel 75 mg BID   -c/w methadone 10mg q8hr  - C/w Clonidine .2 mg q8hr  -Ativan PRN, fentanyl PRN  -CT head unremarkable   -Low concern for malignant hypothermia, NMS, or serotonin syndrome given waxing/waning and lack of inciting medications    Cardiovascular  # Shock   On phenylephrine now, titrating down.  continue on albumin    # TACHYCARDIA: recurrent episodes   -HR can go to 130-150 while agitated - not likely cardiac source  -Sedation as above  -Continue monitoring      Pulm  #Acute hypoxic respiratory failure: secondary to Covid19 pneumonia  #ARDS  -Was on HFNC then got intubated 3/29  -Trach 4/22 continues on Vent   - Remdesivir was discontinued due to positive antibodies   - Finished Dexamethasone   - Covid19 PCR negative now, off isolation   - Daily SBT, difficulty tolerating 2/2 to agitation  - RR inc to 32 o/n for CO2 retention    # Bacterial Pneumonia  - Stable infiltrate on CXR ,likely developed Bacterial pneumonia   - Completed ABx Zosyn, meropenem, s/p 1 dose of Vancomycin  - MRSA negative from 3/26  - Sputum Cx growing few gram negative rods, CRE - Contact isolation  - Taper prednisone to 30 daily (5/11) for possible organizing pneumonia   - C/w bactrim empirically, TIW for PCP PPX  - Secretions from the trach, needs frequent suctions   - Pulm. Dr. Ryan  - ID Dr Anand   - noted to be febrile overnight on 5/15; will monitor for now     #Pneumothorax and pneumomediastinum:   -Thoracic surgery following  -s/p Chest tube placed 4/8 pigtail to wall suction and d/c on 4/15  -On 4/18 chest tube replaced for recurrence of PTX- c/w chest tube to suction     -anterior chest tube placed 5/6 for worsening pneumothorax with resolution of PTX  5/10 CXR with recurrence of apical pneumo - tube adjusted with improvement however still persisting but stable  -Repeat Chest CT shows persistent PTX  -Water seal inferior CT - PTX still stable    ID  Likely bacterial pneumonia   -leukocytosis improved 9k  -Febrile overnight; monitor for now   -Tylenol PRN   -Completed ABX course  -plan as above     Fevers  noted to be febrile overnight; will monitor for now   andrea changed on 5/12, CXR w/o new infiltrate  No abx- no clear infectious source   Was likely 2/2 to withdrawal or ileus  f/u UA     Nephro  -Pitting edema to both arms, ecchymosis on right AC, blisters on left arm around brachial area    -Was retaining urine, andrea was placed 5/5  -DC doxazosin for borderline BPs  -monitor I/Os   -Completed albumin  Metabolic alkalosis stable  Completed diamox   Hold Lasix given Ileus  On Maint IVF  potassium and phosphorus replaced this morning; monitor electrolytes     GI  Transaminitis:   likely secondary to Covid19, Resolved   hepatitis panel -ve  -continue to monitor LFT    Ileus  Abd xray with ileus, CT showing the same  Restart trickle feeds  G Tube off suction now  Rectal tube in place  C/w Miralax BID standing and reglan   G tube 4/23, - dressing changed daily for seroma  Surgery following    Heme  Elevated d-dimer: likely secondary to Covid19   -D-dimer 423 on admission  -Last D-dimer 1434  - No active bleeding, restarted lovenox daily    Anemia  S/p 4 PRBC total  - Now Hg stable 7-9  CTH and CT abdomen w/o contrast no evidence of bleed    No active signs of bleeding (No hematemesis, melena or hematuria)  Hemolysis w/u- negative  Iron studies show ACD  Dr Andrade on board   F/u CBC daily     Endocrine  Prediabetes:  -A1c 5.8  -BS controlled  -continue HSS    Abnormal TSH:  -TSH level noted 0.26,   -Repeat TSH 0.37 and Free T4 1.82    Skin/Catheters  No rashes. Peripheral IV lines.   LIJ  Both arms with edema, right AC with ecchymosis    Prophylaxis   On Lovenox for DVT   Protonix for GI proph    GOC  FULL CODE

## 2021-05-18 LAB
ALBUMIN SERPL ELPH-MCNC: 3.8 G/DL — SIGNIFICANT CHANGE UP (ref 3.5–5)
ALP SERPL-CCNC: 74 U/L — SIGNIFICANT CHANGE UP (ref 40–120)
ALT FLD-CCNC: 38 U/L DA — SIGNIFICANT CHANGE UP (ref 10–60)
ANION GAP SERPL CALC-SCNC: 4 MMOL/L — LOW (ref 5–17)
AST SERPL-CCNC: 18 U/L — SIGNIFICANT CHANGE UP (ref 10–40)
BILIRUB SERPL-MCNC: 1.1 MG/DL — SIGNIFICANT CHANGE UP (ref 0.2–1.2)
BUN SERPL-MCNC: 10 MG/DL — SIGNIFICANT CHANGE UP (ref 7–18)
CALCIUM SERPL-MCNC: 9 MG/DL — SIGNIFICANT CHANGE UP (ref 8.4–10.5)
CHLORIDE SERPL-SCNC: 99 MMOL/L — SIGNIFICANT CHANGE UP (ref 96–108)
CO2 SERPL-SCNC: 38 MMOL/L — HIGH (ref 22–31)
CREAT SERPL-MCNC: 0.29 MG/DL — LOW (ref 0.5–1.3)
GLUCOSE BLDC GLUCOMTR-MCNC: 130 MG/DL — HIGH (ref 70–99)
GLUCOSE BLDC GLUCOMTR-MCNC: 141 MG/DL — HIGH (ref 70–99)
GLUCOSE BLDC GLUCOMTR-MCNC: 166 MG/DL — HIGH (ref 70–99)
GLUCOSE SERPL-MCNC: 120 MG/DL — HIGH (ref 70–99)
HCT VFR BLD CALC: 28 % — LOW (ref 39–50)
HGB BLD-MCNC: 8.9 G/DL — LOW (ref 13–17)
MAGNESIUM SERPL-MCNC: 1.9 MG/DL — SIGNIFICANT CHANGE UP (ref 1.6–2.6)
MCHC RBC-ENTMCNC: 31.8 GM/DL — LOW (ref 32–36)
MCHC RBC-ENTMCNC: 32.8 PG — SIGNIFICANT CHANGE UP (ref 27–34)
MCV RBC AUTO: 103.3 FL — HIGH (ref 80–100)
NRBC # BLD: 0 /100 WBCS — SIGNIFICANT CHANGE UP (ref 0–0)
PHOSPHATE SERPL-MCNC: 2.9 MG/DL — SIGNIFICANT CHANGE UP (ref 2.5–4.5)
PLATELET # BLD AUTO: 191 K/UL — SIGNIFICANT CHANGE UP (ref 150–400)
POTASSIUM SERPL-MCNC: 3.6 MMOL/L — SIGNIFICANT CHANGE UP (ref 3.5–5.3)
POTASSIUM SERPL-SCNC: 3.6 MMOL/L — SIGNIFICANT CHANGE UP (ref 3.5–5.3)
PROT SERPL-MCNC: 6.8 G/DL — SIGNIFICANT CHANGE UP (ref 6–8.3)
RBC # BLD: 2.71 M/UL — LOW (ref 4.2–5.8)
RBC # FLD: 16.9 % — HIGH (ref 10.3–14.5)
SODIUM SERPL-SCNC: 141 MMOL/L — SIGNIFICANT CHANGE UP (ref 135–145)
VANCOMYCIN TROUGH SERPL-MCNC: 18.7 UG/ML — SIGNIFICANT CHANGE UP (ref 10–20)
WBC # BLD: 9.47 K/UL — SIGNIFICANT CHANGE UP (ref 3.8–10.5)
WBC # FLD AUTO: 9.47 K/UL — SIGNIFICANT CHANGE UP (ref 3.8–10.5)

## 2021-05-18 PROCEDURE — 71045 X-RAY EXAM CHEST 1 VIEW: CPT | Mod: 26

## 2021-05-18 PROCEDURE — 71045 X-RAY EXAM CHEST 1 VIEW: CPT | Mod: 26,77

## 2021-05-18 RX ORDER — PHENYLEPHRINE HYDROCHLORIDE 10 MG/ML
1.5 INJECTION INTRAVENOUS
Qty: 40 | Refills: 0 | Status: DISCONTINUED | OUTPATIENT
Start: 2021-05-18 | End: 2021-05-23

## 2021-05-18 RX ORDER — SODIUM CHLORIDE 9 MG/ML
1000 INJECTION, SOLUTION INTRAVENOUS ONCE
Refills: 0 | Status: COMPLETED | OUTPATIENT
Start: 2021-05-18 | End: 2021-05-18

## 2021-05-18 RX ADMIN — Medication 1 APPLICATION(S): at 05:01

## 2021-05-18 RX ADMIN — Medication 1 APPLICATION(S): at 18:08

## 2021-05-18 RX ADMIN — Medication 3 MILLIGRAM(S): at 14:54

## 2021-05-18 RX ADMIN — PIPERACILLIN AND TAZOBACTAM 25 GRAM(S): 4; .5 INJECTION, POWDER, LYOPHILIZED, FOR SOLUTION INTRAVENOUS at 05:01

## 2021-05-18 RX ADMIN — Medication 0.2 MILLIGRAM(S): at 22:27

## 2021-05-18 RX ADMIN — QUETIAPINE FUMARATE 75 MILLIGRAM(S): 200 TABLET, FILM COATED ORAL at 05:03

## 2021-05-18 RX ADMIN — METHADONE HYDROCHLORIDE 10 MILLIGRAM(S): 40 TABLET ORAL at 14:35

## 2021-05-18 RX ADMIN — PIPERACILLIN AND TAZOBACTAM 25 GRAM(S): 4; .5 INJECTION, POWDER, LYOPHILIZED, FOR SOLUTION INTRAVENOUS at 14:35

## 2021-05-18 RX ADMIN — FENTANYL CITRATE 50 MICROGRAM(S): 50 INJECTION INTRAVENOUS at 09:14

## 2021-05-18 RX ADMIN — METHADONE HYDROCHLORIDE 10 MILLIGRAM(S): 40 TABLET ORAL at 22:28

## 2021-05-18 RX ADMIN — SODIUM CHLORIDE 1000 MILLILITER(S): 9 INJECTION, SOLUTION INTRAVENOUS at 06:14

## 2021-05-18 RX ADMIN — CHLORHEXIDINE GLUCONATE 15 MILLILITER(S): 213 SOLUTION TOPICAL at 05:01

## 2021-05-18 RX ADMIN — Medication 30 MILLIGRAM(S): at 05:02

## 2021-05-18 RX ADMIN — FENTANYL CITRATE 50 MICROGRAM(S): 50 INJECTION INTRAVENOUS at 16:57

## 2021-05-18 RX ADMIN — Medication 3 MILLIGRAM(S): at 22:27

## 2021-05-18 RX ADMIN — GABAPENTIN 100 MILLIGRAM(S): 400 CAPSULE ORAL at 05:02

## 2021-05-18 RX ADMIN — Medication 2 MILLIGRAM(S): at 15:55

## 2021-05-18 RX ADMIN — GABAPENTIN 100 MILLIGRAM(S): 400 CAPSULE ORAL at 22:27

## 2021-05-18 RX ADMIN — Medication 1000 MILLIGRAM(S): at 11:34

## 2021-05-18 RX ADMIN — PANTOPRAZOLE SODIUM 40 MILLIGRAM(S): 20 TABLET, DELAYED RELEASE ORAL at 11:35

## 2021-05-18 RX ADMIN — CHLORHEXIDINE GLUCONATE 15 MILLILITER(S): 213 SOLUTION TOPICAL at 18:07

## 2021-05-18 RX ADMIN — MUPIROCIN 1 APPLICATION(S): 20 OINTMENT TOPICAL at 07:02

## 2021-05-18 RX ADMIN — Medication 250 MILLIGRAM(S): at 18:20

## 2021-05-18 RX ADMIN — GABAPENTIN 100 MILLIGRAM(S): 400 CAPSULE ORAL at 14:35

## 2021-05-18 RX ADMIN — FENTANYL CITRATE 50 MICROGRAM(S): 50 INJECTION INTRAVENOUS at 17:39

## 2021-05-18 RX ADMIN — Medication 2 MILLIGRAM(S): at 04:00

## 2021-05-18 RX ADMIN — MUPIROCIN 1 APPLICATION(S): 20 OINTMENT TOPICAL at 18:07

## 2021-05-18 RX ADMIN — PIPERACILLIN AND TAZOBACTAM 25 GRAM(S): 4; .5 INJECTION, POWDER, LYOPHILIZED, FOR SOLUTION INTRAVENOUS at 22:28

## 2021-05-18 RX ADMIN — ENOXAPARIN SODIUM 40 MILLIGRAM(S): 100 INJECTION SUBCUTANEOUS at 11:34

## 2021-05-18 RX ADMIN — Medication 4 MILLIGRAM(S): at 09:39

## 2021-05-18 RX ADMIN — PHENYLEPHRINE HYDROCHLORIDE 47.2 MICROGRAM(S)/KG/MIN: 10 INJECTION INTRAVENOUS at 06:59

## 2021-05-18 RX ADMIN — Medication 5 MILLIGRAM(S): at 22:28

## 2021-05-18 RX ADMIN — Medication 0.2 MILLIGRAM(S): at 04:21

## 2021-05-18 RX ADMIN — Medication 2 MILLIGRAM(S): at 08:56

## 2021-05-18 RX ADMIN — Medication 5 MILLIGRAM(S): at 14:37

## 2021-05-18 RX ADMIN — QUETIAPINE FUMARATE 75 MILLIGRAM(S): 200 TABLET, FILM COATED ORAL at 18:08

## 2021-05-18 RX ADMIN — Medication 250 MILLIGRAM(S): at 05:00

## 2021-05-18 RX ADMIN — CHLORHEXIDINE GLUCONATE 1 APPLICATION(S): 213 SOLUTION TOPICAL at 05:01

## 2021-05-18 RX ADMIN — METHADONE HYDROCHLORIDE 10 MILLIGRAM(S): 40 TABLET ORAL at 05:02

## 2021-05-18 RX ADMIN — Medication 1: at 11:34

## 2021-05-18 RX ADMIN — Medication 50 MILLILITER(S): at 05:00

## 2021-05-18 RX ADMIN — Medication 2 MILLIGRAM(S): at 01:29

## 2021-05-18 RX ADMIN — FENTANYL CITRATE 50 MICROGRAM(S): 50 INJECTION INTRAVENOUS at 09:39

## 2021-05-18 RX ADMIN — Medication 3 MILLIGRAM(S): at 05:02

## 2021-05-18 RX ADMIN — Medication 5 MILLIGRAM(S): at 07:01

## 2021-05-18 RX ADMIN — SODIUM CHLORIDE 1000 MILLILITER(S): 9 INJECTION, SOLUTION INTRAVENOUS at 20:28

## 2021-05-18 NOTE — PROGRESS NOTE ADULT - SUBJECTIVE AND OBJECTIVE BOX
Patient is a 55y old  Male who presents with a chief complaint of SOB (17 May 2021 10:52)    pt seen in icu [ x ], reg med floor [   ], bed [ x ], chair at bedside [   ], awake and responsive [ ], mildly sedated, [ x ],    nad [x  ]      Allergies    No Known Allergies        Vitals    T(F): 99.1 (05-18-21 @ 04:00), Max: 101.3 (05-17-21 @ 12:00)  HR: 75 (05-18-21 @ 06:00) (54 - 146)  BP: 149/81 (05-18-21 @ 05:00) (106/61 - 170/88)  RR: 32 (05-18-21 @ 06:00) (10 - 40)  SpO2: 100% (05-18-21 @ 06:00) (88% - 100%)  Wt(kg): --  CAPILLARY BLOOD GLUCOSE      POCT Blood Glucose.: 130 mg/dL (18 May 2021 05:26)      Labs                          8.9    9.47  )-----------( 191      ( 18 May 2021 05:35 )             28.0       05-18    141  |  99  |  10  ----------------------------<  120<H>  3.6   |  38<H>  |  0.29<L>    Ca    9.0      18 May 2021 05:35  Phos  2.9     05-18  Mg     1.9     05-18    TPro  6.8  /  Alb  3.8  /  TBili  1.1  /  DBili  x   /  AST  18  /  ALT  38  /  AlkPhos  74  05-18            .Sputum Sputum  04-16 @ 04:42   Moderate Enterobacter aerogenes (Carbapenem Resistant)  Normal Respiratory Monserrat present  --  Enterobacter aerogenes (Carbapenem Resistant)      .Urine Clean Catch (Midstream)  03-15 @ 00:52   No growth  --  --          Radiology Results      Meds    MEDICATIONS  (STANDING):  albumin human 25% IVPB 50 milliLiter(s) IV Intermittent every 8 hours  ascorbic acid 1000 milliGRAM(s) Oral daily  BACItracin   Ointment 1 Application(s) Topical two times a day  chlorhexidine 0.12% Liquid 15 milliLiter(s) Oral Mucosa every 12 hours  chlorhexidine 2% Cloths 1 Application(s) Topical <User Schedule>  clonazePAM  Tablet 3 milliGRAM(s) Oral every 8 hours  cloNIDine 0.2 milliGRAM(s) Oral every 8 hours  enoxaparin Injectable 40 milliGRAM(s) SubCutaneous every 24 hours  gabapentin 100 milliGRAM(s) Oral every 8 hours  insulin lispro (ADMELOG) corrective regimen sliding scale   SubCutaneous every 6 hours  methadone    Tablet 10 milliGRAM(s) Oral every 8 hours  metoclopramide   Syrup 5 milliGRAM(s) Oral every 8 hours  mupirocin 2% Ointment 1 Application(s) Both Nostrils two times a day  pantoprazole   Suspension 40 milliGRAM(s) Oral daily  phenylephrine    Infusion 0.9 MICROgram(s)/kG/Min (14.2 mL/Hr) IV Continuous <Continuous>  piperacillin/tazobactam IVPB.. 3.375 Gram(s) IV Intermittent every 8 hours  polyethylene glycol 3350 17 Gram(s) Oral daily  predniSONE   Tablet 30 milliGRAM(s) Oral daily  QUEtiapine 75 milliGRAM(s) Oral two times a day  trimethoprim  160 mG/sulfamethoxazole 800 mG 1 Tablet(s) Oral <User Schedule>  vancomycin  IVPB 1250 milliGRAM(s) IV Intermittent every 12 hours  vancomycin  IVPB          MEDICATIONS  (PRN):  acetaminophen    Suspension .. 650 milliGRAM(s) Oral every 12 hours PRN Temp greater or equal to 38C (100.4F)  ALBUTerol    90 MICROgram(s) HFA Inhaler 2 Puff(s) Inhalation every 6 hours PRN Shortness of Breath and/or Wheezing  artificial  tears Solution 1 Drop(s) Both EYES every 4 hours PRN Dry Eyes  fentaNYL    Injectable 50 MICROGram(s) IV Push every 6 hours PRN Severe Pain (7 - 10)  LORazepam   Injectable 2 milliGRAM(s) IV Push every 6 hours PRN Agitation  sodium chloride 0.9% lock flush 10 milliLiter(s) IV Push every 1 hour PRN Pre/post blood products, medications, blood draw, and to maintain line patency      Physical Exam      Neuro :  no focal deficits  Respiratory: CTA B/L  CV: RRR, S1S2, no murmurs,   Abdominal: Soft, NT, ND +BS, g- tube in place, dressing cdi  Extremities: b/l ue edema, + peripheral pulses    ASSESSMENT      Hypoxemia 2nd to covid pna   transaminitis  prediabetes  h/o appendectomy  cholecystectomy        PLAN    cont cont precautions  covid 5/14/21 neg noted above  d/c remdesevir given covid ab positive noted   completed dexamethasone   started pulse steroids for 3 days - 250mg solumedrol bid now tapered off 4/14/21   C/w prednisone 30 daily    cont on bactrim empirically, TIW   cont asa, vit c,    cont albuterol inhaler   pulm f/u  procalcitonin, D-dimer, crp, ldh, ferritin, lactate noted ,    cont tylenol prn,   cont robitussin prn   pt off precedex    pt methadone 30 q8   pain mgmt eval noted   Pt may have had benzo withdrawal as well as opioid withdrawal causing those symptoms.    Would dc fentanyl and give hydromorphone po as prn breakthrough.   cont phenylephrine drip for hypotension  hold clonidine   s/p intubation 3/29/21   s/p tracheostomy 4/21/21  O2 sat 97% (95% - 100%) mv 40%  O2 via mech vent and taper fio2 as tolerated   vent mgmt as per icu  xray 3/19/21 with pneumomediastinum  rept cxr with New trace right apical pneumothorax. New mild left apical pneumothorax. Grossly stable small pneumomediastinum.  Soft tissue emphysema at the neck bases bilaterally. Grossly stable bilateral pulmonary infiltrates noted.   cxr 2/24 with No evidence of pneumothorax can be appreciated on the available image. This may be related to patient positioning. Evidence of pneumomediastinum and subcutaneous emphysema in the lower neck is again   noted. There are patchy bibasilar infiltrates and elevated right hemidiaphragm noted.   cxr 3/29/21 with No significant change bilateral infiltrates. There is a small simple left apical pneumothorax. No significant pleural effusion. Bilateral subcutaneous emphysema similar to prior.   XRs on 7AM and 5PM with no obvious increase of ptx  cxr 4/4/21 with Improving bilateral airspace disease noted    cxr 4/8/21 with Small left pneumothorax noted.  thoracic surg f/u   s/p L chest tube replacement 4/18/21 for recurrence of left pneumothorax   cxr 4/20/21 with Unchanged advanced infiltrates and catheter left chest tube noted   CXR 4/11/21 with : No interval change compared to one day prior. No pneumothorax noted.   unable to flush or aspirate tube fully, noted debris in tubing which was milked out.   Once material removed from tubing able to aspirated and flush fully. Also changed dressing on pigtail which appears to be in good position.   Monitor O2 status   s/p tracheostomy 4/21/21   stay sutures out 2 weeks from OR date  cxr 4/21/21 with No evidence of active chest disease. Tracheostomy tube in place otherwise no significant change noted   cxr 4/25/21 with A 40% LEFT pneumothorax. LEFT multi-sidehole pigtail catheter overlies LEFT lower hemithorax.. Bilateral multifocal and diffuse ill-defined airspace opacities..  Follow-up AP portable chest radiograph 4/25/2021 AT 8:58 AM: Residual LEFT 30% upper zone pneumothorax. Otherwise no interval change.noted    cxr 4/30/21 Tracheostomy tube again noted. Left chest tube noted with small left apical pneumothorax unchanged. Bilateral airspace opacities overall worsening. Heart size cannot be accurately assessed in this projection noted.   cxr 5/3/21 with Large left pneumothorax noted above.   cxr 5 4/21 with No significant interval change noted above.   ct chest with Extensive chronic appearing consolidative opacities throughout the lungs, most prominent in the left lower lobe and lingula, with bronchiectasis, scarring, and volume loss. Additional scattered peripheral coarse opacities.   Mild left pneumothorax. Left lateral pleural space pigtail catheter, not in continuity with the pneumothorax. Mild bilateral hydronephrosis, similar to appearance on CT of the abdomen and pelvis on April 27. noted above   s/p 2nd chest tube 5/5/21  cxr 5/6/21 with Tracheostomy and catheter left chest tubes remain. , There are significant diffuse advanced infiltrates again noted. No pneumothorax. Above findings are similar to study earlier in the day. Present film shows a left jugular line inserted   with tip entering the superior vena cava noted   cxr 5/11/21 with Heart magnified by technique. Tracheostomy, left jugular line, and 2 catheter left chest tubes remain. Quite advanced infiltration in the lungs particularly in the left lower lobe again noted.  There is a persistent rather small left apical pneumothorax. Chest is similar to May 10 noted    cxt 5/14/21 with Perihilar diffuse airspace disease and LEFT lower lobe retrocardiac airspace consolidation. LEFT chest tube overlies upper zone. Small LEFT apical less than 5% pneumothorax noted above  s/p surgical placement of gastrostomy tube 4/23/2021.   abd xray 5/10/21 with ileus noted   dressing changed daily for seroma  tube feeding resumed   gastrostomy put to suction   CT scan of the chest 5/13/21 demonstrates diffuse bilateral infiltrates with a region of consolidation at the left lung base. Small left pneumothorax. 2 left chest tubes noted in place noted above.  CT scan of the abdomen and pelvis 5/13/21 demonstrates diffuse dilatation of small and large bowel loops most consistent with ileus noted   xray abd with  5/14/21 with Ingested contrast within nonobstructed large bowel to the level of rectum. No acute radiographic intra-abdominal findings noted above.  id f/u   No need for further antibiotics as patient completed course of Meropenem  leukocytosis resolved  sputum cx with Enterobacter aerogenes (Carbapenem Resistant) noted above   tmx 101.7   ua positive    andrea changed  Completed ABx Zosyn, meropenem, s/p 1 dose of Vancomycin  lispro ss   prognosis poor   s/p 4 units prbc for anemia  f/u h/h    transfuse prbc as needed  check vitamin b12  heme onc f/u  retic is elevated.    Haptoglobin normal  ? daily phlebotomy  no hemolysis, Fe/B12/folate adequate  hemolysis is unlikely even his baseline haptoglobin may be very elevated due to COVID  direct pamella neg noted    supplement potassium as needed for hypokalemia  cont current meds         Patient is a 55y old  Male who presents with a chief complaint of SOB (17 May 2021 10:52)    pt seen in icu [ x ], reg med floor [   ], bed [ x ], chair at bedside [   ], awake and responsive [ ], mildly sedated, [ x ],    nad [x  ]      Allergies    No Known Allergies        Vitals    T(F): 99.1 (05-18-21 @ 04:00), Max: 101.3 (05-17-21 @ 12:00)  HR: 75 (05-18-21 @ 06:00) (54 - 146)  BP: 149/81 (05-18-21 @ 05:00) (106/61 - 170/88)  RR: 32 (05-18-21 @ 06:00) (10 - 40)  SpO2: 100% (05-18-21 @ 06:00) (88% - 100%)  Wt(kg): --  CAPILLARY BLOOD GLUCOSE      POCT Blood Glucose.: 130 mg/dL (18 May 2021 05:26)      Labs                          8.9    9.47  )-----------( 191      ( 18 May 2021 05:35 )             28.0       05-18    141  |  99  |  10  ----------------------------<  120<H>  3.6   |  38<H>  |  0.29<L>    Ca    9.0      18 May 2021 05:35  Phos  2.9     05-18  Mg     1.9     05-18    TPro  6.8  /  Alb  3.8  /  TBili  1.1  /  DBili  x   /  AST  18  /  ALT  38  /  AlkPhos  74  05-18            .Sputum Sputum  04-16 @ 04:42   Moderate Enterobacter aerogenes (Carbapenem Resistant)  Normal Respiratory Monserrat present  --  Enterobacter aerogenes (Carbapenem Resistant)      .Urine Clean Catch (Midstream)  03-15 @ 00:52   No growth  --  --          Radiology Results      < from: Xray Chest 1 View-PORTABLE IMMEDIATE (Xray Chest 1 View-PORTABLE IMMEDIATE .) (05.17.21 @ 19:06) >  Similar infiltrates. Smallleft pneumothorax similar to the prior study.    < end of copied text >      Meds    MEDICATIONS  (STANDING):  albumin human 25% IVPB 50 milliLiter(s) IV Intermittent every 8 hours  ascorbic acid 1000 milliGRAM(s) Oral daily  BACItracin   Ointment 1 Application(s) Topical two times a day  chlorhexidine 0.12% Liquid 15 milliLiter(s) Oral Mucosa every 12 hours  chlorhexidine 2% Cloths 1 Application(s) Topical <User Schedule>  clonazePAM  Tablet 3 milliGRAM(s) Oral every 8 hours  cloNIDine 0.2 milliGRAM(s) Oral every 8 hours  enoxaparin Injectable 40 milliGRAM(s) SubCutaneous every 24 hours  gabapentin 100 milliGRAM(s) Oral every 8 hours  insulin lispro (ADMELOG) corrective regimen sliding scale   SubCutaneous every 6 hours  methadone    Tablet 10 milliGRAM(s) Oral every 8 hours  metoclopramide   Syrup 5 milliGRAM(s) Oral every 8 hours  mupirocin 2% Ointment 1 Application(s) Both Nostrils two times a day  pantoprazole   Suspension 40 milliGRAM(s) Oral daily  phenylephrine    Infusion 0.9 MICROgram(s)/kG/Min (14.2 mL/Hr) IV Continuous <Continuous>  piperacillin/tazobactam IVPB.. 3.375 Gram(s) IV Intermittent every 8 hours  polyethylene glycol 3350 17 Gram(s) Oral daily  predniSONE   Tablet 30 milliGRAM(s) Oral daily  QUEtiapine 75 milliGRAM(s) Oral two times a day  trimethoprim  160 mG/sulfamethoxazole 800 mG 1 Tablet(s) Oral <User Schedule>  vancomycin  IVPB 1250 milliGRAM(s) IV Intermittent every 12 hours  vancomycin  IVPB          MEDICATIONS  (PRN):  acetaminophen    Suspension .. 650 milliGRAM(s) Oral every 12 hours PRN Temp greater or equal to 38C (100.4F)  ALBUTerol    90 MICROgram(s) HFA Inhaler 2 Puff(s) Inhalation every 6 hours PRN Shortness of Breath and/or Wheezing  artificial  tears Solution 1 Drop(s) Both EYES every 4 hours PRN Dry Eyes  fentaNYL    Injectable 50 MICROGram(s) IV Push every 6 hours PRN Severe Pain (7 - 10)  LORazepam   Injectable 2 milliGRAM(s) IV Push every 6 hours PRN Agitation  sodium chloride 0.9% lock flush 10 milliLiter(s) IV Push every 1 hour PRN Pre/post blood products, medications, blood draw, and to maintain line patency      Physical Exam      Neuro :  no focal deficits  Respiratory: CTA B/L  CV: RRR, S1S2, no murmurs,   Abdominal: Soft, NT, ND +BS, g- tube in place, dressing cdi  Extremities: b/l ue edema, + peripheral pulses    ASSESSMENT      Hypoxemia 2nd to covid pna   transaminitis  prediabetes  h/o appendectomy  cholecystectomy        PLAN    cont cont precautions  covid 5/14/21 neg noted above  d/c remdesevir given covid ab positive noted   completed dexamethasone   started pulse steroids for 3 days - 250mg solumedrol bid now tapered off 4/14/21   C/w prednisone 30 daily    cont on bactrim empirically, TIW   cont asa, vit c,    cont albuterol inhaler   pulm f/u  procalcitonin, D-dimer, crp, ldh, ferritin, lactate noted ,    cont tylenol prn,   cont robitussin prn   pt off precedex    pt methadone 30 q8   pain mgmt eval noted   Pt may have had benzo withdrawal as well as opioid withdrawal causing those symptoms.    Would dc fentanyl and give hydromorphone po as prn breakthrough.   cont phenylephrine drip for hypotension  hold clonidine   s/p intubation 3/29/21   s/p tracheostomy 4/21/21  O2 sat 100% (88% - 100%) mv 40%  O2 via mech vent and taper fio2 as tolerated   vent mgmt as per icu  xray 3/19/21 with pneumomediastinum  rept cxr with New trace right apical pneumothorax. New mild left apical pneumothorax. Grossly stable small pneumomediastinum.  Soft tissue emphysema at the neck bases bilaterally. Grossly stable bilateral pulmonary infiltrates noted.   cxr 2/24 with No evidence of pneumothorax can be appreciated on the available image. This may be related to patient positioning. Evidence of pneumomediastinum and subcutaneous emphysema in the lower neck is again   noted. There are patchy bibasilar infiltrates and elevated right hemidiaphragm noted.   cxr 3/29/21 with No significant change bilateral infiltrates. There is a small simple left apical pneumothorax. No significant pleural effusion. Bilateral subcutaneous emphysema similar to prior.   XRs on 7AM and 5PM with no obvious increase of ptx  cxr 4/4/21 with Improving bilateral airspace disease noted    cxr 4/8/21 with Small left pneumothorax noted.  thoracic surg f/u   s/p L chest tube replacement 4/18/21 for recurrence of left pneumothorax   cxr 4/20/21 with Unchanged advanced infiltrates and catheter left chest tube noted   CXR 4/11/21 with : No interval change compared to one day prior. No pneumothorax noted.   unable to flush or aspirate tube fully, noted debris in tubing which was milked out.   Once material removed from tubing able to aspirated and flush fully. Also changed dressing on pigtail which appears to be in good position.   Monitor O2 status   s/p tracheostomy 4/21/21   cxr 4/21/21 with No evidence of active chest disease. Tracheostomy tube in place otherwise no significant change noted   cxr 4/25/21 with A 40% LEFT pneumothorax. LEFT multi-sidehole pigtail catheter overlies LEFT lower hemithorax.. Bilateral multifocal and diffuse ill-defined airspace opacities..  Follow-up AP portable chest radiograph 4/25/2021 AT 8:58 AM: Residual LEFT 30% upper zone pneumothorax. Otherwise no interval change.noted    cxr 4/30/21 Tracheostomy tube again noted. Left chest tube noted with small left apical pneumothorax unchanged. Bilateral airspace opacities overall worsening. Heart size cannot be accurately assessed in this projection noted.   cxr 5/3/21 with Large left pneumothorax noted above.   cxr 5 4/21 with No significant interval change noted above.   ct chest with Extensive chronic appearing consolidative opacities throughout the lungs, most prominent in the left lower lobe and lingula, with bronchiectasis, scarring, and volume loss. Additional scattered peripheral coarse opacities.   Mild left pneumothorax. Left lateral pleural space pigtail catheter, not in continuity with the pneumothorax. Mild bilateral hydronephrosis, similar to appearance on CT of the abdomen and pelvis on April 27. noted above   s/p 2nd chest tube 5/5/21  cxr 5/6/21 with Tracheostomy and catheter left chest tubes remain. , There are significant diffuse advanced infiltrates again noted. No pneumothorax. Above findings are similar to study earlier in the day. Present film shows a left jugular line inserted   with tip entering the superior vena cava noted   cxr 5/11/21 with Heart magnified by technique. Tracheostomy, left jugular line, and 2 catheter left chest tubes remain. Quite advanced infiltration in the lungs particularly in the left lower lobe again noted.  There is a persistent rather small left apical pneumothorax. Chest is similar to May 10 noted    cxt 5/14/21 with Perihilar diffuse airspace disease and LEFT lower lobe retrocardiac airspace consolidation. LEFT chest tube overlies upper zone. Small LEFT apical less than 5% pneumothorax noted   cxr 5/17/21 with Similar infiltrates. Small left pneumothorax similar to the prior study noted above.  s/p surgical placement of gastrostomy tube 4/23/2021.   abd xray 5/10/21 with ileus noted   dressing changed daily for seroma  tube feeding resumed   gastrostomy put to suction   CT scan of the chest 5/13/21 demonstrates diffuse bilateral infiltrates with a region of consolidation at the left lung base. Small left pneumothorax. 2 left chest tubes noted in place noted above.  CT scan of the abdomen and pelvis 5/13/21 demonstrates diffuse dilatation of small and large bowel loops most consistent with ileus noted   xray abd with  5/14/21 with Ingested contrast within nonobstructed large bowel to the level of rectum. No acute radiographic intra-abdominal findings noted above.  id f/u   No need for further antibiotics as patient completed course of Meropenem  leukocytosis resolved  sputum cx with Enterobacter aerogenes (Carbapenem Resistant) noted above   tmx 101.3    ua positive    andrea changed  Completed  meropenem, s/p 1 dose of Vancomycin   zosyn and vanco resumed   lispro ss   prognosis poor   s/p 4 units prbc for anemia  f/u h/h    transfuse prbc as needed  heme onc f/u  retic is elevated.    Haptoglobin normal  ? daily phlebotomy  no hemolysis, Fe/B12/folate adequate  hemolysis is unlikely even his baseline haptoglobin may be very elevated due to COVID  direct pamella neg noted    supplement potassium as needed for hypokalemia  cont current meds

## 2021-05-18 NOTE — PROGRESS NOTE ADULT - ASSESSMENT
Assessment and plan: 54 y/o M with no PMH is admitted to ICU for AHRF 2/2 covid19 pna     1. Acute hypoxic respiratory failure  2. ARDS 2/2 Covid pneumonia  3. Pneumothorax  4. Prediabetes  5. Abnormal TSH     Neuro  -Alert and oriented x 3 at baseline   -Off all drips overnight, and calm  -C/w Klonopin to 3mg q8h  -C/w Seroquel 75 mg BID   -c/w methadone 10mg q8hr  - C/w Clonidine .2 mg q8hr  -Ativan PRN, fentanyl PRN  -CT head unremarkable   -Low concern for malignant hypothermia, NMS, or serotonin syndrome given waxing/waning and lack of inciting medications    Cardiovascular  # Shock   On phenylephrine now, titrating down.  Was given albumin for few days    # TACHYCARDIA: recurrent episodes   -HR can go to 130-150 while agitated - not likely cardiac source  -Sedation as above  -Continue monitoring      Pulm  #Acute hypoxic respiratory failure: secondary to Covid19 pneumonia  #ARDS  -Was on HFNC then got intubated 3/29  -Trach 4/22 continues on Vent   - Remdesivir was discontinued due to positive antibodies   - Finished Dexamethasone   - Covid19 PCR negative now, off isolation   - Daily SBT, difficulty tolerating 2/2 to agitation  - RR inc to 32 o/n for CO2 retention    # Bacterial Pneumonia  - Stable infiltrate on CXR ,likely developed Bacterial pneumonia   - Completed ABx Zosyn, meropenem, s/p 1 dose of Vancomycin  - MRSA negative from 3/26  - Sputum Cx growing few gram negative rods, CRE - Contact isolation  - Taper prednisone to 30 daily (5/11) for possible organizing pneumonia   - C/w bactrim empirically, TIW for PCP PPX  - Secretions from the trach, needs frequent suctions   - Pulm. Dr. Ryan  - ID Dr Anand   - noted to be febrile overnight on 5/15; will monitor for now     #Pneumothorax and pneumomediastinum:   -Thoracic surgery following  -s/p Chest tube placed 4/8 pigtail to wall suction and d/c on 4/15  -On 4/18 chest tube replaced for recurrence of PTX- c/w chest tube to suction     -anterior chest tube placed 5/6 for worsening pneumothorax with resolution of PTX  5/10 CXR with recurrence of apical pneumo - tube adjusted with improvement however still persisting but stable  -Repeat Chest CT shows persistent PTX  -Water seal inferior CT - PTX still stable  - Lateral Ct was clamped, will repeat CXR and if stable plan is to remove th tube    ID  Likely bacterial pneumonia   -leukocytosis improved 9k  -Febrile overnight; monitor for now   -Tylenol PRN   -Completed ABX course  -plan as above     Fevers  noted to be febrile overnight; will monitor for now   andrea changed on 5/12, CXR w/o new infiltrate  No abx- no clear infectious source   Was likely 2/2 to withdrawal or ileus  f/u UA     Nephro  -Pitting edema to both arms, ecchymosis on right AC, blisters on left arm around brachial area    -Was retaining urine, andrea was placed 5/5  -DC doxazosin for borderline BPs  -monitor I/Os   -Completed albumin  Metabolic alkalosis stable  Completed diamox   Hold Lasix given Ileus  On Maint IVF  potassium and phosphorus replaced this morning; monitor electrolytes     GI  Transaminitis:   likely secondary to Covid19, Resolved   hepatitis panel -ve  -continue to monitor LFT    Ileus  Abd xray with ileus, CT showing the same  Restart trickle feeds  G Tube off suction now  Rectal tube in place  C/w Miralax BID standing and reglan   G tube 4/23, - dressing changed daily for seroma  Surgery following    Heme  Elevated d-dimer: likely secondary to Covid19   -D-dimer 423 on admission  -Last D-dimer 1434  - No active bleeding, restarted lovenox daily    Anemia  S/p 4 PRBC total  - Now Hg stable 7-9  CTH and CT abdomen w/o contrast no evidence of bleed    No active signs of bleeding (No hematemesis, melena or hematuria)  Hemolysis w/u- negative  Iron studies show ACD  Dr Andrade on board   F/u CBC daily     Endocrine  Prediabetes:  -A1c 5.8  -BS controlled  -continue HSS    Abnormal TSH:  -TSH level noted 0.26,   -Repeat TSH 0.37 and Free T4 1.82    Skin/Catheters  No rashes. Peripheral IV lines.   LIJ  Both arms with edema, right AC with ecchymosis    Prophylaxis   On Lovenox for DVT   Protonix for GI proph    GOC  FULL CODE Assessment and plan: 54 y/o M with no PMH is admitted to ICU for AHRF 2/2 covid19 pna     1. Acute hypoxic respiratory failure  2. ARDS 2/2 Covid pneumonia  3. Pneumothorax  4. Prediabetes  5. Abnormal TSH     Neuro  -Alert and oriented x 3 at baseline   -Off all drips overnight, and calm  -C/w Klonopin to 3mg q8h  -C/w Seroquel 75 mg BID   -c/w methadone 10mg q8hr  - C/w Clonidine .2 mg q8hr  -Ativan PRN, fentanyl PRN  -CT head unremarkable   -Low concern for malignant hypothermia, NMS, or serotonin syndrome given waxing/waning and lack of inciting medications    Cardiovascular  # Shock   On phenylephrine now, titrating down.  Was given albumin for few days    # TACHYCARDIA: recurrent episodes   -HR can go to 130-150 while agitated - not likely cardiac source  -Sedation as above  -Continue monitoring      Pulm  #Acute hypoxic respiratory failure: secondary to Covid19 pneumonia  #ARDS  -Was on HFNC then got intubated 3/29  -Trach 4/22 continues on Vent   - Remdesivir was discontinued due to positive antibodies   - Finished Dexamethasone   - Covid19 PCR negative now, off isolation   - Daily SBT, difficulty tolerating 2/2 to agitation  - RR inc to 32 o/n for CO2 retention    # Bacterial Pneumonia  - Stable infiltrate on CXR ,likely developed Bacterial pneumonia   - Completed ABx Zosyn, meropenem, s/p 1 dose of Vancomycin  - MRSA negative from 3/26  - Sputum Cx growing few gram negative rods, CRE - Contact isolation  - Taper prednisone to 30 daily (5/11) for possible organizing pneumonia   - C/w bactrim empirically, TIW for PCP PPX  - Secretions from the trach, needs frequent suctions   - Pulm. Dr. Ryan  - ID Dr Anand   - Was found to have fevers for past 2 days  Was started on vanc and zosyn  Will send in MRSA and sputum cx  If MRSA is negative, will d/c vanc  vanc trough 5/19 AM    #Pneumothorax and pneumomediastinum:   -Thoracic surgery following  -s/p Chest tube placed 4/8 pigtail to wall suction and d/c on 4/15  -On 4/18 chest tube replaced for recurrence of PTX- c/w chest tube to suction     -anterior chest tube placed 5/6 for worsening pneumothorax with resolution of PTX  5/10 CXR with recurrence of apical pneumo - tube adjusted with improvement however still persisting but stable  -Repeat Chest CT shows persistent PTX  -Water seal inferior CT - PTX still stable  - Lateral Ct was clamped, will repeat CXR and if stable plan is to remove th tube    ID  Likely bacterial pneumonia   -leukocytosis improved 9k  -Febrile overnight  - Was found to have fevers for past 2 days  Was started on vanc and zosyn  Will send in MRSA and sputum cx  If MRSA is negative, will d/c vanc  vanc trough 5/19 AM    Fevers  noted to be febrile overnight; will monitor for now   andrea changed on 5/12, CXR w/o new infiltrate  No abx- no clear infectious source   Was likely 2/2 to withdrawal or ileus  f/u UA     Nephro  -Pitting edema to both arms, ecchymosis on right AC, blisters on left arm around brachial area    -Was retaining urine, andrea was placed 5/5  -DC doxazosin for borderline BPs  -monitor I/Os   -Completed albumin  Metabolic alkalosis stable  Completed diamox   Hold Lasix given Ileus  On Maint IVF  potassium and phosphorus replaced this morning; monitor electrolytes     GI  Transaminitis:   likely secondary to Covid19, Resolved   hepatitis panel -ve  -continue to monitor LFT    Ileus  Abd xray with ileus, CT showing the same  Restart trickle feeds  G Tube off suction now  Rectal tube in place  C/w Miralax BID standing and reglan   G tube 4/23, - dressing changed daily for seroma  Surgery following    Heme  Elevated d-dimer: likely secondary to Covid19   -D-dimer 423 on admission  -Last D-dimer 1434  - No active bleeding, restarted lovenox daily    Anemia  S/p 4 PRBC total  - Now Hg stable 7-9  CTH and CT abdomen w/o contrast no evidence of bleed    No active signs of bleeding (No hematemesis, melena or hematuria)  Hemolysis w/u- negative  Iron studies show ACD  Dr Andrade on board   F/u CBC daily     Endocrine  Prediabetes:  -A1c 5.8  -BS controlled  -continue HSS    Abnormal TSH:  -TSH level noted 0.26,   -Repeat TSH 0.37 and Free T4 1.82    Skin/Catheters  No rashes. Peripheral IV lines.   LIJ  Both arms with edema, right AC with ecchymosis    Prophylaxis   On Lovenox for DVT   Protonix for GI proph    GOC  FULL CODE

## 2021-05-18 NOTE — PROGRESS NOTE ADULT - SUBJECTIVE AND OBJECTIVE BOX
INTERVAL HPI/OVERNIGHT EVENTS: Patient was tachy and agitated. Later he had hypotension and bradycardia. He was given 1L bolus and was restarted on pheny after which he improved    PRESSORS: [x ] YES [ ] NO  WHICH:    ANTIBIOTICS:                      Antimicrobial:  piperacillin/tazobactam IVPB.. 3.375 Gram(s) IV Intermittent every 8 hours  trimethoprim  160 mG/sulfamethoxazole 800 mG 1 Tablet(s) Oral <User Schedule>  vancomycin  IVPB 1250 milliGRAM(s) IV Intermittent every 12 hours  vancomycin  IVPB        Cardiovascular:  cloNIDine 0.2 milliGRAM(s) Oral every 8 hours  phenylephrine    Infusion 1.5 MICROgram(s)/kG/Min IV Continuous <Continuous>    Pulmonary:  ALBUTerol    90 MICROgram(s) HFA Inhaler 2 Puff(s) Inhalation every 6 hours PRN    Hematalogic:  enoxaparin Injectable 40 milliGRAM(s) SubCutaneous every 24 hours    Other:  acetaminophen    Suspension .. 650 milliGRAM(s) Oral every 12 hours PRN  albumin human 25% IVPB 50 milliLiter(s) IV Intermittent every 8 hours  artificial  tears Solution 1 Drop(s) Both EYES every 4 hours PRN  ascorbic acid 1000 milliGRAM(s) Oral daily  BACItracin   Ointment 1 Application(s) Topical two times a day  chlorhexidine 0.12% Liquid 15 milliLiter(s) Oral Mucosa every 12 hours  chlorhexidine 2% Cloths 1 Application(s) Topical <User Schedule>  clonazePAM  Tablet 3 milliGRAM(s) Oral every 8 hours  fentaNYL    Injectable 50 MICROGram(s) IV Push every 6 hours PRN  gabapentin 100 milliGRAM(s) Oral every 8 hours  insulin lispro (ADMELOG) corrective regimen sliding scale   SubCutaneous every 6 hours  LORazepam   Injectable 2 milliGRAM(s) IV Push every 6 hours PRN  methadone    Tablet 10 milliGRAM(s) Oral every 8 hours  metoclopramide   Syrup 5 milliGRAM(s) Oral every 8 hours  mupirocin 2% Ointment 1 Application(s) Both Nostrils two times a day  pantoprazole   Suspension 40 milliGRAM(s) Oral daily  polyethylene glycol 3350 17 Gram(s) Oral daily  predniSONE   Tablet 30 milliGRAM(s) Oral daily  QUEtiapine 75 milliGRAM(s) Oral two times a day  sodium chloride 0.9% lock flush 10 milliLiter(s) IV Push every 1 hour PRN    acetaminophen    Suspension .. 650 milliGRAM(s) Oral every 12 hours PRN  albumin human 25% IVPB 50 milliLiter(s) IV Intermittent every 8 hours  ALBUTerol    90 MICROgram(s) HFA Inhaler 2 Puff(s) Inhalation every 6 hours PRN  artificial  tears Solution 1 Drop(s) Both EYES every 4 hours PRN  ascorbic acid 1000 milliGRAM(s) Oral daily  BACItracin   Ointment 1 Application(s) Topical two times a day  chlorhexidine 0.12% Liquid 15 milliLiter(s) Oral Mucosa every 12 hours  chlorhexidine 2% Cloths 1 Application(s) Topical <User Schedule>  clonazePAM  Tablet 3 milliGRAM(s) Oral every 8 hours  cloNIDine 0.2 milliGRAM(s) Oral every 8 hours  enoxaparin Injectable 40 milliGRAM(s) SubCutaneous every 24 hours  fentaNYL    Injectable 50 MICROGram(s) IV Push every 6 hours PRN  gabapentin 100 milliGRAM(s) Oral every 8 hours  insulin lispro (ADMELOG) corrective regimen sliding scale   SubCutaneous every 6 hours  LORazepam   Injectable 2 milliGRAM(s) IV Push every 6 hours PRN  methadone    Tablet 10 milliGRAM(s) Oral every 8 hours  metoclopramide   Syrup 5 milliGRAM(s) Oral every 8 hours  mupirocin 2% Ointment 1 Application(s) Both Nostrils two times a day  pantoprazole   Suspension 40 milliGRAM(s) Oral daily  phenylephrine    Infusion 1.5 MICROgram(s)/kG/Min IV Continuous <Continuous>  piperacillin/tazobactam IVPB.. 3.375 Gram(s) IV Intermittent every 8 hours  polyethylene glycol 3350 17 Gram(s) Oral daily  predniSONE   Tablet 30 milliGRAM(s) Oral daily  QUEtiapine 75 milliGRAM(s) Oral two times a day  sodium chloride 0.9% lock flush 10 milliLiter(s) IV Push every 1 hour PRN  trimethoprim  160 mG/sulfamethoxazole 800 mG 1 Tablet(s) Oral <User Schedule>  vancomycin  IVPB 1250 milliGRAM(s) IV Intermittent every 12 hours  vancomycin  IVPB        Drug Dosing Weight  Height (cm): 167.6 (2021 23:15)  Weight (kg): 83.9 (2021 23:15)  BMI (kg/m2): 29.9 (2021 23:15)  BSA (m2): 1.93 (2021 23:15)    CENTRAL LINE: [ ] YES [ ] NO  LOCATION:   DATE INSERTED:  REMOVE: [ ] YES [ ] NO  EXPLAIN:    RIVERA: [ ] YES [ ] NO    DATE INSERTED:  REMOVE:  [ ] YES [ ] NO  EXPLAIN:    A-LINE:  [ ] YES [ ] NO  LOCATION:   DATE INSERTED:  REMOVE:  [ ] YES [ ] NO  EXPLAIN:    PMH -reviewed admission note, no change since admission    ICU Vital Signs Last 24 Hrs  T(C): 36.6 (18 May 2021 07:00), Max: 38.5 (17 May 2021 12:00)  T(F): 97.8 (18 May 2021 07:00), Max: 101.3 (17 May 2021 12:00)  HR: 53 (18 May 2021 07:45) (51 - 146)  BP: 133/82 (18 May 2021 07:45) (100/65 - 166/93)  BP(mean): 93 (18 May 2021 07:45) (71 - 107)  ABP: --  ABP(mean): --  RR: 32 (18 May 2021 07:45) (10 - 40)  SpO2: 100% (18 May 2021 07:45) (88% - 100%)      ABG - ( 17 May 2021 03:50 )  pH, Arterial: 7.48  pH, Blood: x     /  pCO2: 57    /  pO2: 158   / HCO3: 42    / Base Excess: 16.0  /  SaO2: 100                   05-17 @ 07:01  -  05-18 @ 07:00  --------------------------------------------------------  IN: 2574 mL / OUT: 1080 mL / NET: 1494 mL        Mode: AC/ CMV (Assist Control/ Continuous Mandatory Ventilation)  RR (machine): 32  TV (machine): 400  FiO2: 40  PEEP: 5  ITime: 1  MAP: 15  PIP: 37      PHYSICAL EXAM:  GENERAL: Patient seen resting in bed and in no apparent distress   HEAD: Atraumatic, Normocephalic  EYES: PERRL, conjunctiva and sclera clear  NECK: Supple, normal appearance   NERVOUS SYSTEM:  Awake + encephalopathy  Moving all 4 extremities  CHEST/LUNx chest tube on L. No chest deformity; Crackles b/l. No, rhonchi, wheezing   HEART: Tachycardic Regular rhythm; No murmurs, rubs, or gallops  ABDOMEN: PEG in place. Soft, Nontender, Nondistended; Bowel sounds present + PEG  EXTREMITIES:  Mild dependent edema. R arm edema and ecchymosis.  Peripheral Pulses intact, No clubbing, cyanosis,   SKIN: Intact, No rashes or lesions  Rivera with dark brown urine      LABS:  CBC Full  -  ( 18 May 2021 05:35 )  WBC Count : 9.47 K/uL  RBC Count : 2.71 M/uL  Hemoglobin : 8.9 g/dL  Hematocrit : 28.0 %  Platelet Count - Automated : 191 K/uL  Mean Cell Volume : 103.3 fl  Mean Cell Hemoglobin : 32.8 pg  Mean Cell Hemoglobin Concentration : 31.8 gm/dL  Auto Neutrophil # : x  Auto Lymphocyte # : x  Auto Monocyte # : x  Auto Eosinophil # : x  Auto Basophil # : x  Auto Neutrophil % : x  Auto Lymphocyte % : x  Auto Monocyte % : x  Auto Eosinophil % : x  Auto Basophil % : x    05-18    141  |  99  |  10  ----------------------------<  120<H>  3.6   |  38<H>  |  0.29<L>    Ca    9.0      18 May 2021 05:35  Phos  2.9     05-18  Mg     1.9     05-18    TPro  6.8  /  Alb  3.8  /  TBili  1.1  /  DBili  x   /  AST  18  /  ALT  38  /  AlkPhos  74  05-18      Urinalysis Basic - ( 16 May 2021 19:14 )    Color: Shayla / Appearance: very cloudy / S.025 / pH: x  Gluc: x / Ketone: Negative  / Bili: Negative / Urobili: 4   Blood: x / Protein: 100 / Nitrite: Negative   Leuk Esterase: Trace / RBC: >50 /HPF / WBC 3-5 /HPF   Sq Epi: x / Non Sq Epi: Occasional /HPF / Bacteria: Few /HPF          RADIOLOGY & ADDITIONAL STUDIES REVIEWED:  ***    GOALS OF CARE DISCUSSION WITH PATIENT/FAMILY/PROXY:    CRITICAL CARE TIME SPENT: 35 minutes

## 2021-05-18 NOTE — PROGRESS NOTE ADULT - ATTENDING COMMENTS
55 yr old  man , non smoker with  moody 1990s presented 3/14 with x9 days worsening cough, subjective fevers, and SOB, with x2-3 days dysuria and central, non-radiating, constant CP. Admitted to medicine unit  for acute hypoxic respiratory failure secondary to pna from covid-19 infection .     Assessment:  1. Acute hypoxic respiratory failure  2. Covid-19 infection   3. Transaminitis  4. Prediabetes  5. Bilateral pneumothorax  6. Septic shock - resolved  7. Anemia  8. bowel obstruction/ileus    Plan   -Difficult to control agitation/anxiety despite multiple medications  -Will request psych evaluation   -persistent left apical PTX, lateral chest tube was clamped, will d/c today  -S/p tracheostomy placement 4/21   -S/p G-tube 4/23  -Cont. mechanical ventilation   -Daily weaning trials  -PT evaluation  -On antibiotics again, for fevers?   -tube feedings, hold bowel regimen  -dvt/gi prophy  -hemodynamic monitoring   -Prognosis is guarded .

## 2021-05-18 NOTE — PROGRESS NOTE ADULT - SUBJECTIVE AND OBJECTIVE BOX
Patient is a 55y old  Male who presents with a chief complaint of SOB (18 May 2021 08:00)  Awake, alert, comfortable in bed in NAD. Currently on ventilator via trach. Moving all 4 exts.    INTERVAL HPI/OVERNIGHT EVENTS:      VITAL SIGNS:  T(F): 97.8 (21 @ 07:00)  HR: 127 (21 @ 10:15)  BP: 107/44 (21 @ 10:15)  RR: 23 (21 @ 10:15)  SpO2: 99% (21 @ 10:15)  Wt(kg): --  I&O's Detail    17 May 2021 07:01  -  18 May 2021 07:00  --------------------------------------------------------  IN:    Albumin 5%  - 250 mL: 50 mL    dextrose 5% + sodium chloride 0.9%: 150 mL    Enteral Tube Flush: 100 mL    IV PiggyBack: 400 mL    IV PiggyBack: 750 mL    IV PiggyBack: 334 mL    IV PiggyBack: 50 mL    Jevity 1.5: 240 mL    Lactated Ringers Bolus: 500 mL  Total IN: 2574 mL    OUT:    Chest Tube (mL): 0 mL    Chest Tube (mL): 0 mL    Indwelling Catheter - Urethral (mL): 1080 mL  Total OUT: 1080 mL    Total NET: 1494 mL      18 May 2021 07:01  -  18 May 2021 10:29  --------------------------------------------------------  IN:    Jevity 1.5: 30 mL  Total IN: 30 mL    OUT:    Indwelling Catheter - Urethral (mL): 325 mL    Phenylephrine: 0 mL  Total OUT: 325 mL    Total NET: -295 mL        Mode: AC/ CMV (Assist Control/ Continuous Mandatory Ventilation)  RR (machine): 32  TV (machine): 400  FiO2: 40  PEEP: 5  ITime: 0.7  MAP: 13  PIP: 34        REVIEW OF SYSTEMS:    CONSTITUTIONAL:  No fevers, chills, sweats    HEENT:  Eyes:  No diplopia or blurred vision. ENT:  No earache, sore throat or runny nose.    CARDIOVASCULAR:  No pressure, squeezing, tightness, or heaviness about the chest; no palpitations.    RESPIRATORY:  Per HPI    GASTROINTESTINAL:  No abdominal pain, nausea, vomiting or diarrhea.    GENITOURINARY:  No dysuria, frequency or urgency.    NEUROLOGIC:  No paresthesias, fasciculations, seizures or weakness.    PSYCHIATRIC:  No disorder of thought or mood.      PHYSICAL EXAM:    Constitutional: Well developed and nourished  Eyes:Perrla  ENMT: normal  Neck:supple  Respiratory: good air entry  Cardiovascular: S1 S2 regular  Gastrointestinal: Soft, Non tender  Extremities: No edema  Vascular:normal  Neurological:Awake, alert  Musculoskeletal:Normal      MEDICATIONS  (STANDING):  ascorbic acid 1000 milliGRAM(s) Oral daily  BACItracin   Ointment 1 Application(s) Topical two times a day  chlorhexidine 0.12% Liquid 15 milliLiter(s) Oral Mucosa every 12 hours  chlorhexidine 2% Cloths 1 Application(s) Topical <User Schedule>  clonazePAM  Tablet 3 milliGRAM(s) Oral every 8 hours  cloNIDine 0.2 milliGRAM(s) Oral every 8 hours  enoxaparin Injectable 40 milliGRAM(s) SubCutaneous every 24 hours  gabapentin 100 milliGRAM(s) Oral every 8 hours  insulin lispro (ADMELOG) corrective regimen sliding scale   SubCutaneous every 6 hours  methadone    Tablet 10 milliGRAM(s) Oral every 8 hours  metoclopramide   Syrup 5 milliGRAM(s) Oral every 8 hours  mupirocin 2% Ointment 1 Application(s) Both Nostrils two times a day  pantoprazole   Suspension 40 milliGRAM(s) Oral daily  phenylephrine    Infusion 1.5 MICROgram(s)/kG/Min (47.2 mL/Hr) IV Continuous <Continuous>  piperacillin/tazobactam IVPB.. 3.375 Gram(s) IV Intermittent every 8 hours  predniSONE   Tablet 30 milliGRAM(s) Oral daily  QUEtiapine 75 milliGRAM(s) Oral two times a day  trimethoprim  160 mG/sulfamethoxazole 800 mG 1 Tablet(s) Oral <User Schedule>  vancomycin  IVPB      vancomycin  IVPB 1250 milliGRAM(s) IV Intermittent every 12 hours    MEDICATIONS  (PRN):  acetaminophen    Suspension .. 650 milliGRAM(s) Oral every 12 hours PRN Temp greater or equal to 38C (100.4F)  ALBUTerol    90 MICROgram(s) HFA Inhaler 2 Puff(s) Inhalation every 6 hours PRN Shortness of Breath and/or Wheezing  artificial  tears Solution 1 Drop(s) Both EYES every 4 hours PRN Dry Eyes  fentaNYL    Injectable 50 MICROGram(s) IV Push every 6 hours PRN Severe Pain (7 - 10)  LORazepam   Injectable 2 milliGRAM(s) IV Push every 6 hours PRN Agitation  sodium chloride 0.9% lock flush 10 milliLiter(s) IV Push every 1 hour PRN Pre/post blood products, medications, blood draw, and to maintain line patency      Allergies    No Known Allergies    Intolerances        LABS:                        8.9    9.47  )-----------( 191      ( 18 May 2021 05:35 )             28.0     05-18    141  |  99  |  10  ----------------------------<  120<H>  3.6   |  38<H>  |  0.29<L>    Ca    9.0      18 May 2021 05:35  Phos  2.9     05-18  Mg     1.9     05-18    TPro  6.8  /  Alb  3.8  /  TBili  1.1  /  DBili  x   /  AST  18  /  ALT  38  /  AlkPhos  74  05-18      Urinalysis Basic - ( 16 May 2021 19:14 )    Color: Shayla / Appearance: very cloudy / S.025 / pH: x  Gluc: x / Ketone: Negative  / Bili: Negative / Urobili: 4   Blood: x / Protein: 100 / Nitrite: Negative   Leuk Esterase: Trace / RBC: >50 /HPF / WBC 3-5 /HPF   Sq Epi: x / Non Sq Epi: Occasional /HPF / Bacteria: Few /HPF      ABG - ( 17 May 2021 03:50 )  pH, Arterial: 7.48  pH, Blood: x     /  pCO2: 57    /  pO2: 158   / HCO3: 42    / Base Excess: 16.0  /  SaO2: 100                   CAPILLARY BLOOD GLUCOSE      POCT Blood Glucose.: 130 mg/dL (18 May 2021 05:26)  POCT Blood Glucose.: 102 mg/dL (17 May 2021 22:46)  POCT Blood Glucose.: 128 mg/dL (17 May 2021 18:26)  POCT Blood Glucose.: 148 mg/dL (17 May 2021 11:25)        RADIOLOGY & ADDITIONAL TESTS:    CXR:  < from: Xray Chest 1 View-PORTABLE IMMEDIATE (Xray Chest 1 View-PORTABLE IMMEDIATE .) (21 @ 19:06) >  IMPRESSION:  Similar infiltrates. Smallleft pneumothorax similar to the prior study.    < end of copied text >    Ct scan chest:    ekg;    echo:

## 2021-05-19 DIAGNOSIS — F05 DELIRIUM DUE TO KNOWN PHYSIOLOGICAL CONDITION: ICD-10-CM

## 2021-05-19 LAB
ALBUMIN SERPL ELPH-MCNC: 3 G/DL — LOW (ref 3.5–5)
ALP SERPL-CCNC: 89 U/L — SIGNIFICANT CHANGE UP (ref 40–120)
ALT FLD-CCNC: 27 U/L DA — SIGNIFICANT CHANGE UP (ref 10–60)
ANION GAP SERPL CALC-SCNC: 8 MMOL/L — SIGNIFICANT CHANGE UP (ref 5–17)
AST SERPL-CCNC: 16 U/L — SIGNIFICANT CHANGE UP (ref 10–40)
BASE EXCESS BLDA CALC-SCNC: 11.1 MMOL/L — HIGH (ref -2–3)
BILIRUB SERPL-MCNC: 1 MG/DL — SIGNIFICANT CHANGE UP (ref 0.2–1.2)
BLOOD GAS COMMENTS ARTERIAL: SIGNIFICANT CHANGE UP
BUN SERPL-MCNC: 21 MG/DL — HIGH (ref 7–18)
CALCIUM SERPL-MCNC: 8.7 MG/DL — SIGNIFICANT CHANGE UP (ref 8.4–10.5)
CHLORIDE SERPL-SCNC: 100 MMOL/L — SIGNIFICANT CHANGE UP (ref 96–108)
CO2 SERPL-SCNC: 31 MMOL/L — SIGNIFICANT CHANGE UP (ref 22–31)
CREAT SERPL-MCNC: 0.98 MG/DL — SIGNIFICANT CHANGE UP (ref 0.5–1.3)
GLUCOSE BLDC GLUCOMTR-MCNC: 124 MG/DL — HIGH (ref 70–99)
GLUCOSE BLDC GLUCOMTR-MCNC: 124 MG/DL — HIGH (ref 70–99)
GLUCOSE BLDC GLUCOMTR-MCNC: 129 MG/DL — HIGH (ref 70–99)
GLUCOSE BLDC GLUCOMTR-MCNC: 140 MG/DL — HIGH (ref 70–99)
GLUCOSE BLDC GLUCOMTR-MCNC: 183 MG/DL — HIGH (ref 70–99)
GLUCOSE BLDC GLUCOMTR-MCNC: >600 MG/DL — CRITICAL HIGH (ref 70–99)
GLUCOSE SERPL-MCNC: 168 MG/DL — HIGH (ref 70–99)
HCO3 BLDA-SCNC: 37 MMOL/L — HIGH (ref 21–28)
HCT VFR BLD CALC: 25.8 % — LOW (ref 39–50)
HCT VFR BLD CALC: 29.7 % — LOW (ref 39–50)
HGB BLD-MCNC: 6.1 G/DL — CRITICAL LOW (ref 13–17)
HGB BLD-MCNC: 9.3 G/DL — LOW (ref 13–17)
HOROWITZ INDEX BLDA+IHG-RTO: 40 — SIGNIFICANT CHANGE UP
MAGNESIUM SERPL-MCNC: 1.3 MG/DL — LOW (ref 1.6–2.6)
MCHC RBC-ENTMCNC: 23.6 GM/DL — LOW (ref 32–36)
MCHC RBC-ENTMCNC: 31.3 GM/DL — LOW (ref 32–36)
MCHC RBC-ENTMCNC: 31.8 PG — SIGNIFICANT CHANGE UP (ref 27–34)
MCHC RBC-ENTMCNC: 32.4 PG — SIGNIFICANT CHANGE UP (ref 27–34)
MCV RBC AUTO: 101.7 FL — HIGH (ref 80–100)
MCV RBC AUTO: 137.2 FL — HIGH (ref 80–100)
MRSA PCR RESULT.: SIGNIFICANT CHANGE UP
NRBC # BLD: 0 /100 WBCS — SIGNIFICANT CHANGE UP (ref 0–0)
NRBC # BLD: 0 /100 WBCS — SIGNIFICANT CHANGE UP (ref 0–0)
PCO2 BLDA: 55 MMHG — HIGH (ref 35–48)
PH BLDA: 7.44 — SIGNIFICANT CHANGE UP (ref 7.35–7.45)
PLATELET # BLD AUTO: 204 K/UL — SIGNIFICANT CHANGE UP (ref 150–400)
PLATELET # BLD AUTO: 91 K/UL — LOW (ref 150–400)
PO2 BLDA: 67 MMHG — LOW (ref 83–108)
POTASSIUM SERPL-MCNC: 3.7 MMOL/L — SIGNIFICANT CHANGE UP (ref 3.5–5.3)
POTASSIUM SERPL-SCNC: 3.7 MMOL/L — SIGNIFICANT CHANGE UP (ref 3.5–5.3)
PROT SERPL-MCNC: 6 G/DL — SIGNIFICANT CHANGE UP (ref 6–8.3)
RBC # BLD: 1.88 M/UL — LOW (ref 4.2–5.8)
RBC # BLD: 2.92 M/UL — LOW (ref 4.2–5.8)
RBC # FLD: 17.4 % — HIGH (ref 10.3–14.5)
RBC # FLD: 17.9 % — HIGH (ref 10.3–14.5)
S AUREUS DNA NOSE QL NAA+PROBE: SIGNIFICANT CHANGE UP
SAO2 % BLDA: 96 % — SIGNIFICANT CHANGE UP
SODIUM SERPL-SCNC: 139 MMOL/L — SIGNIFICANT CHANGE UP (ref 135–145)
WBC # BLD: 13.13 K/UL — HIGH (ref 3.8–10.5)
WBC # BLD: 6.07 K/UL — SIGNIFICANT CHANGE UP (ref 3.8–10.5)
WBC # FLD AUTO: 13.13 K/UL — HIGH (ref 3.8–10.5)
WBC # FLD AUTO: 6.07 K/UL — SIGNIFICANT CHANGE UP (ref 3.8–10.5)

## 2021-05-19 PROCEDURE — 90792 PSYCH DIAG EVAL W/MED SRVCS: CPT

## 2021-05-19 PROCEDURE — 93971 EXTREMITY STUDY: CPT | Mod: 26,LT

## 2021-05-19 PROCEDURE — 99291 CRITICAL CARE FIRST HOUR: CPT

## 2021-05-19 PROCEDURE — 93971 EXTREMITY STUDY: CPT | Mod: 26,59,LT

## 2021-05-19 PROCEDURE — 71045 X-RAY EXAM CHEST 1 VIEW: CPT | Mod: 26

## 2021-05-19 RX ORDER — MAGNESIUM SULFATE 500 MG/ML
2 VIAL (ML) INJECTION ONCE
Refills: 0 | Status: COMPLETED | OUTPATIENT
Start: 2021-05-19 | End: 2021-05-19

## 2021-05-19 RX ADMIN — Medication 1 APPLICATION(S): at 06:09

## 2021-05-19 RX ADMIN — PIPERACILLIN AND TAZOBACTAM 25 GRAM(S): 4; .5 INJECTION, POWDER, LYOPHILIZED, FOR SOLUTION INTRAVENOUS at 13:35

## 2021-05-19 RX ADMIN — Medication 5 MILLIGRAM(S): at 13:46

## 2021-05-19 RX ADMIN — CHLORHEXIDINE GLUCONATE 15 MILLILITER(S): 213 SOLUTION TOPICAL at 06:10

## 2021-05-19 RX ADMIN — FENTANYL CITRATE 50 MICROGRAM(S): 50 INJECTION INTRAVENOUS at 04:44

## 2021-05-19 RX ADMIN — Medication 5 MILLIGRAM(S): at 21:02

## 2021-05-19 RX ADMIN — QUETIAPINE FUMARATE 75 MILLIGRAM(S): 200 TABLET, FILM COATED ORAL at 18:14

## 2021-05-19 RX ADMIN — Medication 5 MILLIGRAM(S): at 06:09

## 2021-05-19 RX ADMIN — PANTOPRAZOLE SODIUM 40 MILLIGRAM(S): 20 TABLET, DELAYED RELEASE ORAL at 13:42

## 2021-05-19 RX ADMIN — METHADONE HYDROCHLORIDE 10 MILLIGRAM(S): 40 TABLET ORAL at 06:08

## 2021-05-19 RX ADMIN — PHENYLEPHRINE HYDROCHLORIDE 47.2 MICROGRAM(S)/KG/MIN: 10 INJECTION INTRAVENOUS at 01:47

## 2021-05-19 RX ADMIN — Medication 160 MILLIGRAM(S): at 06:09

## 2021-05-19 RX ADMIN — Medication 1 APPLICATION(S): at 18:40

## 2021-05-19 RX ADMIN — Medication 250 MILLIGRAM(S): at 06:09

## 2021-05-19 RX ADMIN — Medication 3 MILLIGRAM(S): at 06:08

## 2021-05-19 RX ADMIN — Medication 2 MILLIGRAM(S): at 02:58

## 2021-05-19 RX ADMIN — Medication 0.2 MILLIGRAM(S): at 06:08

## 2021-05-19 RX ADMIN — Medication 50 GRAM(S): at 09:00

## 2021-05-19 RX ADMIN — PIPERACILLIN AND TAZOBACTAM 25 GRAM(S): 4; .5 INJECTION, POWDER, LYOPHILIZED, FOR SOLUTION INTRAVENOUS at 21:01

## 2021-05-19 RX ADMIN — Medication 1: at 18:14

## 2021-05-19 RX ADMIN — FENTANYL CITRATE 50 MICROGRAM(S): 50 INJECTION INTRAVENOUS at 04:45

## 2021-05-19 RX ADMIN — MUPIROCIN 1 APPLICATION(S): 20 OINTMENT TOPICAL at 18:39

## 2021-05-19 RX ADMIN — METHADONE HYDROCHLORIDE 10 MILLIGRAM(S): 40 TABLET ORAL at 21:02

## 2021-05-19 RX ADMIN — Medication 1000 MILLIGRAM(S): at 13:34

## 2021-05-19 RX ADMIN — GABAPENTIN 100 MILLIGRAM(S): 400 CAPSULE ORAL at 21:02

## 2021-05-19 RX ADMIN — PIPERACILLIN AND TAZOBACTAM 25 GRAM(S): 4; .5 INJECTION, POWDER, LYOPHILIZED, FOR SOLUTION INTRAVENOUS at 06:10

## 2021-05-19 RX ADMIN — CHLORHEXIDINE GLUCONATE 1 APPLICATION(S): 213 SOLUTION TOPICAL at 06:10

## 2021-05-19 RX ADMIN — METHADONE HYDROCHLORIDE 10 MILLIGRAM(S): 40 TABLET ORAL at 13:42

## 2021-05-19 RX ADMIN — MUPIROCIN 1 APPLICATION(S): 20 OINTMENT TOPICAL at 06:10

## 2021-05-19 RX ADMIN — QUETIAPINE FUMARATE 75 MILLIGRAM(S): 200 TABLET, FILM COATED ORAL at 06:09

## 2021-05-19 RX ADMIN — GABAPENTIN 100 MILLIGRAM(S): 400 CAPSULE ORAL at 13:43

## 2021-05-19 RX ADMIN — CHLORHEXIDINE GLUCONATE 15 MILLILITER(S): 213 SOLUTION TOPICAL at 18:40

## 2021-05-19 RX ADMIN — Medication 3 MILLIGRAM(S): at 21:02

## 2021-05-19 RX ADMIN — GABAPENTIN 100 MILLIGRAM(S): 400 CAPSULE ORAL at 06:09

## 2021-05-19 RX ADMIN — ENOXAPARIN SODIUM 40 MILLIGRAM(S): 100 INJECTION SUBCUTANEOUS at 13:33

## 2021-05-19 RX ADMIN — Medication 30 MILLIGRAM(S): at 06:08

## 2021-05-19 RX ADMIN — PHENYLEPHRINE HYDROCHLORIDE 47.2 MICROGRAM(S)/KG/MIN: 10 INJECTION INTRAVENOUS at 19:30

## 2021-05-19 NOTE — PROGRESS NOTE ADULT - ASSESSMENT
Assessment and plan: 56 y/o M with no PMH is admitted to ICU for AHRF 2/2 covid19 pna     1. Acute hypoxic respiratory failure  2. ARDS 2/2 Covid pneumonia  3. Pneumothorax  4. Prediabetes  5. Abnormal TSH     Neuro  -Alert and oriented x 3 at baseline   -Off all drips overnight, and calm  -C/w Klonopin to 3mg q8h  -C/w Seroquel 75 mg BID   -c/w methadone 10mg q8hr  - C/w Clonidine .2 mg q8hr  -Ativan PRN, fentanyl PRN  -CT head unremarkable   -Low concern for malignant hypothermia, NMS, or serotonin syndrome given waxing/waning and lack of inciting medications    Cardiovascular  # Shock   Resolved  Off pheny  Was given albumin for few days    # TACHYCARDIA: recurrent episodes   -HR can go to 130-150 while agitated - not likely cardiac source  -Sedation as above  -Continue monitoring      Pulm  #Acute hypoxic respiratory failure: secondary to Covid19 pneumonia  #ARDS  -Was on HFNC then got intubated 3/29  -Trach 4/22 continues on Vent   - Remdesivir was discontinued due to positive antibodies   - Finished Dexamethasone   - Covid19 PCR negative now, off isolation   - Daily SBT, difficulty tolerating 2/2 to agitation  - RR inc to 32 o/n for CO2 retention    # Bacterial Pneumonia  - Stable infiltrate on CXR ,likely developed Bacterial pneumonia   - Completed ABx Zosyn, meropenem, s/p 1 dose of Vancomycin  - MRSA negative from 3/26  - Sputum Cx growing few gram negative rods, CRE - Contact isolation  - Taper prednisone to 30 daily (5/11) for possible organizing pneumonia   - C/w bactrim empirically, TIW for PCP PPX  - Secretions from the trach, needs frequent suctions   - Pulm. Dr. Ryan  - ID Dr Anand   - Was found to have fevers for past 2 days  Was started on vanc and zosyn  Will send in MRSA and sputum cx  If MRSA is negative, will d/c vanc  vanc trough 5/19 PM    #Pneumothorax and pneumomediastinum:   -Thoracic surgery following  -s/p Chest tube placed 4/8 pigtail to wall suction and d/c on 4/15  -On 4/18 chest tube replaced for recurrence of PTX- c/w chest tube to suction     -anterior chest tube placed 5/6 for worsening pneumothorax with resolution of PTX  5/10 CXR with recurrence of apical pneumo - tube adjusted with improvement however still persisting but stable  -Repeat Chest CT shows persistent PTX  -Water seal inferior CT - PTX still stable  - Lateral Ct was removed, repeat CXr showed developing of PTX in left lower lobe    ID  Likely bacterial pneumonia   -leukocytosis improved 9k  -Febrile overnight  - Was found to have fevers for past 2 days  Was started on vanc and zosyn  Will send in MRSA and sputum cx  If MRSA is negative, will d/c vanc  vanc trough 5/19 PM      Nephro  -Pitting edema to both arms, ecchymosis on right AC, blisters on left arm around brachial area    -Was retaining urine, andrea was placed 5/5  -DC doxazosin for borderline BPs  -monitor I/Os   -Completed albumin  Metabolic alkalosis stable  Completed diamox   Hold Lasix given Ileus  On Maint IVF  potassium and phosphorus replaced this morning; monitor electrolytes     GI  Transaminitis:   likely secondary to Covid19, Resolved   hepatitis panel -ve  -continue to monitor LFT    Ileus  Abd xray with ileus, CT showing the same  Restart trickle feeds  G Tube off suction now  Rectal tube in place  C/w Miralax BID standing and reglan   G tube 4/23, - dressing changed daily for seroma  Ileus is improving on repeat xray abdomen    Heme  Elevated d-dimer: likely secondary to Covid19   -D-dimer 423 on admission  -Last D-dimer 1434  - No active bleeding, restarted lovenox daily    Anemia  S/p 4 PRBC total  - Now Hg stable 7-9  CTH and CT abdomen w/o contrast no evidence of bleed    No active signs of bleeding (No hematemesis, melena or hematuria)  Hemolysis w/u- negative  Iron studies show ACD  Dr Andrade on board   F/u CBC daily     Endocrine  Prediabetes:  -A1c 5.8  -BS controlled  -continue HSS    Abnormal TSH:  -TSH level noted 0.26,   -Repeat TSH 0.37 and Free T4 1.82    Skin/Catheters  No rashes. Peripheral IV lines.   LIJ  Both arms with edema, right AC with ecchymosis    Prophylaxis   On Lovenox for DVT   Protonix for GI proph    GOC  FULL CODE

## 2021-05-19 NOTE — CHART NOTE - NSCHARTNOTEFT_GEN_A_CORE
Assessment:   Patient is a 55y old  Male who presents with a chief complaint of SOB (19 May 2021 13:05). Intubated (S/p trach/ PEG). ON trickle feeds , TF increased to goal  as per flow records. Seen in AM and PM, TF infusing @ 40 ml/hr.Discussed with PGY 2 re :"diarrhea". MD to continue TF as ordered          Diet Prescription: Diet, NPO with Tube Feed:   Tube Feeding Modality: Gastrostomy  Jevity 1.5 Leonardo  Total Volume for 24 Hours (mL): 960  Continuous  Starting Tube Feed Rate {mL per Hour}: 10  Increase Tube Feed Rate by (mL): 10     Every hour  Until Goal Tube Feed Rate (mL per Hour): 40  Tube Feed Duration (in Hours): 24  Tube Feed Start Time: 01:00  No Carb Prosource (1pkg = 15gms Protein)     Qty per Day:  1 (21 @ 07:23)        Daily     Daily Weight in k.8 (18 May 2021 07:00)  Weight in k.1 (17 May 2021 07:00)  Weight in k.7 (16 May 2021 07:00)  Weight in k.5 (15 May 2021 08:00)  Weight in k.4 (13 May 2021 07:30)  Weight in k.1 (12 May 2021 07:00)  Weight in k.8 (11 May 2021 07:00)  Weight in k.3 (08 May 2021 08:00)  Weight in k.9 (07 May 2021 08:00)  Weight in k.3 (06 May 2021 07:00)        Pertinent Medications: MEDICATIONS  (STANDING):  ascorbic acid 1000 milliGRAM(s) Oral daily  BACItracin   Ointment 1 Application(s) Topical two times a day  chlorhexidine 0.12% Liquid 15 milliLiter(s) Oral Mucosa every 12 hours  chlorhexidine 2% Cloths 1 Application(s) Topical <User Schedule>  clonazePAM  Tablet 3 milliGRAM(s) Oral every 8 hours  enoxaparin Injectable 40 milliGRAM(s) SubCutaneous every 24 hours  gabapentin 100 milliGRAM(s) Oral every 8 hours  insulin lispro (ADMELOG) corrective regimen sliding scale   SubCutaneous every 6 hours  methadone    Tablet 10 milliGRAM(s) Oral every 8 hours  metoclopramide   Syrup 5 milliGRAM(s) Oral every 8 hours  mupirocin 2% Ointment 1 Application(s) Both Nostrils two times a day  pantoprazole   Suspension 40 milliGRAM(s) Oral daily  phenylephrine    Infusion 1.5 MICROgram(s)/kG/Min (47.2 mL/Hr) IV Continuous <Continuous>  piperacillin/tazobactam IVPB.. 3.375 Gram(s) IV Intermittent every 8 hours  QUEtiapine 75 milliGRAM(s) Oral two times a day  trimethoprim  40 mG/sulfamethoxazole 200 mG Suspension 160 milliGRAM(s) Oral <User Schedule>    MEDICATIONS  (PRN):  acetaminophen    Suspension .. 650 milliGRAM(s) Oral every 12 hours PRN Temp greater or equal to 38C (100.4F)  ALBUTerol    90 MICROgram(s) HFA Inhaler 2 Puff(s) Inhalation every 6 hours PRN Shortness of Breath and/or Wheezing  artificial  tears Solution 1 Drop(s) Both EYES every 4 hours PRN Dry Eyes  fentaNYL    Injectable 50 MICROGram(s) IV Push every 6 hours PRN Severe Pain (7 - 10)  LORazepam   Injectable 2 milliGRAM(s) IV Push every 6 hours PRN Agitation  sodium chloride 0.9% lock flush 10 milliLiter(s) IV Push every 1 hour PRN Pre/post blood products, medications, blood draw, and to maintain line patency    Pertinent Labs:  Na139 mmol/L Glu 168 mg/dL<H> K+ 3.7 mmol/L Cr  0.98 mg/dL BUN 21 mg/dL<H>  Phos 2.9 mg/dL  Alb 3.0 g/dL<L> 04-25 Chol 165 mg/dL LDL --    HDL 26 mg/dL<L> Trig 414 mg/dL<H>     CAPILLARY BLOOD GLUCOSE      POCT Blood Glucose.: 140 mg/dL (19 May 2021 13:49)  POCT Blood Glucose.: 129 mg/dL (19 May 2021 06:04)  POCT Blood Glucose.: >600 mg/dL (19 May 2021 06:02)  POCT Blood Glucose.: 124 mg/dL (19 May 2021 00:46)  POCT Blood Glucose.: 141 mg/dL (18 May 2021 17:04)          Previous Nutrition Diagnosis:   [ ] Altered GI function  [ ]Inadequate Oral Intake [ ] Swallowing Difficulty   [ ] Altered nutrition related labs [ ] Increased Nutrient Needs [ ] Overweight/Obesity   [ ] Unintended Weight Loss [ ] Food & Nutrition Related Knowledge Deficit [x ] Malnutrition (severe PCM)  [ ] Other:     Nutrition Diagnosis is [x ] ongoing  [ ] resolved [ ] not applicable            Interventions:   Recommend  [ ] Change Diet To:  [ ] Nutrition Supplement  [x ] Nutrition Support: TF as ordered See above)  [x ] Other: MD to monitor. RD available.     Monitoring and Evaluation:    [ x ] Tolerance to diet prescription [ x ] weights [ x ] labs[ x ] follow up per protocol  [ ] other:

## 2021-05-19 NOTE — BH CONSULTATION LIAISON ASSESSMENT NOTE - CURRENT MEDICATION
MEDICATIONS  (STANDING):  ascorbic acid 1000 milliGRAM(s) Oral daily  BACItracin   Ointment 1 Application(s) Topical two times a day  chlorhexidine 0.12% Liquid 15 milliLiter(s) Oral Mucosa every 12 hours  chlorhexidine 2% Cloths 1 Application(s) Topical <User Schedule>  clonazePAM  Tablet 3 milliGRAM(s) Oral every 8 hours  enoxaparin Injectable 40 milliGRAM(s) SubCutaneous every 24 hours  gabapentin 100 milliGRAM(s) Oral every 8 hours  insulin lispro (ADMELOG) corrective regimen sliding scale   SubCutaneous every 6 hours  methadone    Tablet 10 milliGRAM(s) Oral every 8 hours  metoclopramide   Syrup 5 milliGRAM(s) Oral every 8 hours  mupirocin 2% Ointment 1 Application(s) Both Nostrils two times a day  pantoprazole   Suspension 40 milliGRAM(s) Oral daily  phenylephrine    Infusion 1.5 MICROgram(s)/kG/Min (47.2 mL/Hr) IV Continuous <Continuous>  piperacillin/tazobactam IVPB.. 3.375 Gram(s) IV Intermittent every 8 hours  QUEtiapine 75 milliGRAM(s) Oral two times a day  trimethoprim  40 mG/sulfamethoxazole 200 mG Suspension 160 milliGRAM(s) Oral <User Schedule>    MEDICATIONS  (PRN):  acetaminophen    Suspension .. 650 milliGRAM(s) Oral every 12 hours PRN Temp greater or equal to 38C (100.4F)  ALBUTerol    90 MICROgram(s) HFA Inhaler 2 Puff(s) Inhalation every 6 hours PRN Shortness of Breath and/or Wheezing  artificial  tears Solution 1 Drop(s) Both EYES every 4 hours PRN Dry Eyes  fentaNYL    Injectable 50 MICROGram(s) IV Push every 6 hours PRN Severe Pain (7 - 10)  LORazepam   Injectable 2 milliGRAM(s) IV Push every 6 hours PRN Agitation  sodium chloride 0.9% lock flush 10 milliLiter(s) IV Push every 1 hour PRN Pre/post blood products, medications, blood draw, and to maintain line patency

## 2021-05-19 NOTE — PROGRESS NOTE ADULT - SUBJECTIVE AND OBJECTIVE BOX
INTERVAL HPI/OVERNIGHT EVENTS: Lateral CT was clamped on , repeat CXR showed small PTX on left lower lung area. Psych was consulted for evaluation    PRESSORS: [ ] YES [x ] NO  WHICH:    ANTIBIOTICS:                      Antimicrobial:  piperacillin/tazobactam IVPB.. 3.375 Gram(s) IV Intermittent every 8 hours  trimethoprim  40 mG/sulfamethoxazole 200 mG Suspension 160 milliGRAM(s) Oral <User Schedule>  vancomycin  IVPB 1250 milliGRAM(s) IV Intermittent every 12 hours  vancomycin  IVPB        Cardiovascular:  cloNIDine 0.2 milliGRAM(s) Oral every 8 hours  phenylephrine    Infusion 1.5 MICROgram(s)/kG/Min IV Continuous <Continuous>    Pulmonary:  ALBUTerol    90 MICROgram(s) HFA Inhaler 2 Puff(s) Inhalation every 6 hours PRN    Hematalogic:  enoxaparin Injectable 40 milliGRAM(s) SubCutaneous every 24 hours    Other:  acetaminophen    Suspension .. 650 milliGRAM(s) Oral every 12 hours PRN  artificial  tears Solution 1 Drop(s) Both EYES every 4 hours PRN  ascorbic acid 1000 milliGRAM(s) Oral daily  BACItracin   Ointment 1 Application(s) Topical two times a day  chlorhexidine 0.12% Liquid 15 milliLiter(s) Oral Mucosa every 12 hours  chlorhexidine 2% Cloths 1 Application(s) Topical <User Schedule>  clonazePAM  Tablet 3 milliGRAM(s) Oral every 8 hours  fentaNYL    Injectable 50 MICROGram(s) IV Push every 6 hours PRN  gabapentin 100 milliGRAM(s) Oral every 8 hours  insulin lispro (ADMELOG) corrective regimen sliding scale   SubCutaneous every 6 hours  LORazepam   Injectable 2 milliGRAM(s) IV Push every 6 hours PRN  magnesium sulfate  IVPB 2 Gram(s) IV Intermittent once  methadone    Tablet 10 milliGRAM(s) Oral every 8 hours  metoclopramide   Syrup 5 milliGRAM(s) Oral every 8 hours  mupirocin 2% Ointment 1 Application(s) Both Nostrils two times a day  pantoprazole   Suspension 40 milliGRAM(s) Oral daily  predniSONE   Tablet 30 milliGRAM(s) Oral daily  QUEtiapine 75 milliGRAM(s) Oral two times a day  sodium chloride 0.9% lock flush 10 milliLiter(s) IV Push every 1 hour PRN    acetaminophen    Suspension .. 650 milliGRAM(s) Oral every 12 hours PRN  ALBUTerol    90 MICROgram(s) HFA Inhaler 2 Puff(s) Inhalation every 6 hours PRN  artificial  tears Solution 1 Drop(s) Both EYES every 4 hours PRN  ascorbic acid 1000 milliGRAM(s) Oral daily  BACItracin   Ointment 1 Application(s) Topical two times a day  chlorhexidine 0.12% Liquid 15 milliLiter(s) Oral Mucosa every 12 hours  chlorhexidine 2% Cloths 1 Application(s) Topical <User Schedule>  clonazePAM  Tablet 3 milliGRAM(s) Oral every 8 hours  cloNIDine 0.2 milliGRAM(s) Oral every 8 hours  enoxaparin Injectable 40 milliGRAM(s) SubCutaneous every 24 hours  fentaNYL    Injectable 50 MICROGram(s) IV Push every 6 hours PRN  gabapentin 100 milliGRAM(s) Oral every 8 hours  insulin lispro (ADMELOG) corrective regimen sliding scale   SubCutaneous every 6 hours  LORazepam   Injectable 2 milliGRAM(s) IV Push every 6 hours PRN  magnesium sulfate  IVPB 2 Gram(s) IV Intermittent once  methadone    Tablet 10 milliGRAM(s) Oral every 8 hours  metoclopramide   Syrup 5 milliGRAM(s) Oral every 8 hours  mupirocin 2% Ointment 1 Application(s) Both Nostrils two times a day  pantoprazole   Suspension 40 milliGRAM(s) Oral daily  phenylephrine    Infusion 1.5 MICROgram(s)/kG/Min IV Continuous <Continuous>  piperacillin/tazobactam IVPB.. 3.375 Gram(s) IV Intermittent every 8 hours  predniSONE   Tablet 30 milliGRAM(s) Oral daily  QUEtiapine 75 milliGRAM(s) Oral two times a day  sodium chloride 0.9% lock flush 10 milliLiter(s) IV Push every 1 hour PRN  trimethoprim  40 mG/sulfamethoxazole 200 mG Suspension 160 milliGRAM(s) Oral <User Schedule>  vancomycin  IVPB 1250 milliGRAM(s) IV Intermittent every 12 hours  vancomycin  IVPB        Drug Dosing Weight  Height (cm): 167.6 (2021 23:15)  Weight (kg): 83.9 (2021 23:15)  BMI (kg/m2): 29.9 (2021 23:15)  BSA (m2): 1.93 (2021 23:15)    CENTRAL LINE: [ ] YES [x ] NO  LOCATION:   DATE INSERTED:  REMOVE: [ ] YES [ ] NO  EXPLAIN:    RIVERA: [x ] YES [ ] NO    DATE INSERTED:  REMOVE:  [ ] YES [ ] NO  EXPLAIN:    A-LINE:  [ ] YES [ x] NO  LOCATION:   DATE INSERTED:  REMOVE:  [ ] YES [ ] NO  EXPLAIN:    PMH -reviewed admission note, no change since admission    ICU Vital Signs Last 24 Hrs  T(C): 37.5 (19 May 2021 04:45), Max: 38 (18 May 2021 19:15)  T(F): 99.5 (19 May 2021 04:45), Max: 100.4 (18 May 2021 19:15)  HR: 96 (19 May 2021 06:00) (47 - 152)  BP: 112/59 (19 May 2021 06:00) (71/31 - 169/98)  BP(mean): 71 (19 May 2021 06:00) (40 - 115)  RR: 32 (19 May 2021 06:00) (15 - 38)  SpO2: 98% (19 May 2021 06:00) (92% - 100%)      ABG - ( 19 May 2021 03:37 )  pH, Arterial: 7.44  pH, Blood: x     /  pCO2: 55    /  pO2: 67    / HCO3: 37    / Base Excess: 11.1  /  SaO2: 96                    05-18 @ 07:01  -  05- @ 07:00  --------------------------------------------------------  IN: 2117.3 mL / OUT: 2050 mL / NET: 67.3 mL        Mode: AC/ CMV (Assist Control/ Continuous Mandatory Ventilation)  RR (machine): 32  TV (machine): 400  FiO2: 40  PEEP: 5  ITime: 0.7  MAP: 19  PIP: 43      PHYSICAL EXAM:  GENERAL: Patient seen resting in bed and in no apparent distress   HEAD: Atraumatic, Normocephalic  EYES: PERRL, conjunctiva and sclera clear  NECK: Supple, normal appearance   NERVOUS SYSTEM:  Awake + encephalopathy  Moving all 4 extremities  CHEST/LUN chest tube on L. No chest deformity; Crackles b/l. No, rhonchi, wheezing   HEART: Tachycardic Regular rhythm; No murmurs, rubs, or gallops  ABDOMEN: PEG in place. Soft, Bowel sounds present + PEG, mildly distended  EXTREMITIES:  left upper extremity edema, 2+, firm ,no tenderness  SKIN: Intact, No rashes or lesions  Rivera with pinkish urine      LABS:  CBC Full  -  ( 19 May 2021 06:15 )  WBC Count : 6.07 K/uL  RBC Count : 1.88 M/uL  Hemoglobin : 6.1 g/dL  Hematocrit : 25.8 %  Platelet Count - Automated : 91 K/uL  Mean Cell Volume : 137.2 fl  Mean Cell Hemoglobin : 32.4 pg  Mean Cell Hemoglobin Concentration : 23.6 gm/dL  Auto Neutrophil # : x  Auto Lymphocyte # : x  Auto Monocyte # : x  Auto Eosinophil # : x  Auto Basophil # : x  Auto Neutrophil % : x  Auto Lymphocyte % : x  Auto Monocyte % : x  Auto Eosinophil % : x  Auto Basophil % : x    05-18    141  |  99  |  10  ----------------------------<  120<H>  3.6   |  38<H>  |  0.29<L>    Ca    9.0      18 May 2021 05:35  Phos  2.9     -  Mg     1.3         TPro  6.8  /  Alb  3.8  /  TBili  1.1  /  DBili  x   /  AST  18  /  ALT  38  /  AlkPhos  74  -            RADIOLOGY & ADDITIONAL STUDIES REVIEWED:  ***    GOALS OF CARE DISCUSSION WITH PATIENT/FAMILY/PROXY:    CRITICAL CARE TIME SPENT: 35 minutes

## 2021-05-19 NOTE — PROGRESS NOTE ADULT - SUBJECTIVE AND OBJECTIVE BOX
Patient is a 55y old  Male who presents with a chief complaint of SOB (19 May 2021 09:18)    pt seen in icu [ x ], reg med floor [   ], bed [ x ], chair at bedside [   ], awake and responsive [  ], mildly sedated, [ x ],    nad [x  ]    Allergies    No Known Allergies        Vitals    T(F): 99.5 (05-19-21 @ 04:45), Max: 100.4 (05-18-21 @ 19:15)  HR: 75 (05-19-21 @ 12:30) (71 - 152)  BP: 80/49 (05-19-21 @ 12:15) (61/35 - 169/98)  RR: 32 (05-19-21 @ 12:30) (15 - 38)  SpO2: 100% (05-19-21 @ 12:30) (92% - 100%)  Wt(kg): --  CAPILLARY BLOOD GLUCOSE      POCT Blood Glucose.: 129 mg/dL (19 May 2021 06:04)      Labs                          9.3    13.13 )-----------( 204      ( 19 May 2021 10:48 )             29.7       05-19    139  |  100  |  21<H>  ----------------------------<  168<H>  3.7   |  31  |  0.98    Ca    8.7      19 May 2021 10:48  Phos  2.9     05-18  Mg     1.3     05-19    TPro  6.0  /  Alb  3.0<L>  /  TBili  1.0  /  DBili  x   /  AST  16  /  ALT  27  /  AlkPhos  89  05-19            .Sputum Sputum  04-16 @ 04:42   Moderate Enterobacter aerogenes (Carbapenem Resistant)  Normal Respiratory Monserrat present  --  Enterobacter aerogenes (Carbapenem Resistant)      .Urine Clean Catch (Midstream)  03-15 @ 00:52   No growth  --  --          Radiology Results      Meds    MEDICATIONS  (STANDING):  ascorbic acid 1000 milliGRAM(s) Oral daily  BACItracin   Ointment 1 Application(s) Topical two times a day  chlorhexidine 0.12% Liquid 15 milliLiter(s) Oral Mucosa every 12 hours  chlorhexidine 2% Cloths 1 Application(s) Topical <User Schedule>  clonazePAM  Tablet 3 milliGRAM(s) Oral every 8 hours  enoxaparin Injectable 40 milliGRAM(s) SubCutaneous every 24 hours  gabapentin 100 milliGRAM(s) Oral every 8 hours  insulin lispro (ADMELOG) corrective regimen sliding scale   SubCutaneous every 6 hours  magnesium sulfate  IVPB 2 Gram(s) IV Intermittent once  methadone    Tablet 10 milliGRAM(s) Oral every 8 hours  metoclopramide   Syrup 5 milliGRAM(s) Oral every 8 hours  mupirocin 2% Ointment 1 Application(s) Both Nostrils two times a day  pantoprazole   Suspension 40 milliGRAM(s) Oral daily  phenylephrine    Infusion 1.5 MICROgram(s)/kG/Min (47.2 mL/Hr) IV Continuous <Continuous>  piperacillin/tazobactam IVPB.. 3.375 Gram(s) IV Intermittent every 8 hours  QUEtiapine 75 milliGRAM(s) Oral two times a day  trimethoprim  40 mG/sulfamethoxazole 200 mG Suspension 160 milliGRAM(s) Oral <User Schedule>  vancomycin  IVPB 1250 milliGRAM(s) IV Intermittent every 12 hours  vancomycin  IVPB          MEDICATIONS  (PRN):  acetaminophen    Suspension .. 650 milliGRAM(s) Oral every 12 hours PRN Temp greater or equal to 38C (100.4F)  ALBUTerol    90 MICROgram(s) HFA Inhaler 2 Puff(s) Inhalation every 6 hours PRN Shortness of Breath and/or Wheezing  artificial  tears Solution 1 Drop(s) Both EYES every 4 hours PRN Dry Eyes  fentaNYL    Injectable 50 MICROGram(s) IV Push every 6 hours PRN Severe Pain (7 - 10)  LORazepam   Injectable 2 milliGRAM(s) IV Push every 6 hours PRN Agitation  sodium chloride 0.9% lock flush 10 milliLiter(s) IV Push every 1 hour PRN Pre/post blood products, medications, blood draw, and to maintain line patency    Physical Exam      Neuro :  no focal deficits  Respiratory: CTA B/L  CV: RRR, S1S2, no murmurs,   Abdominal: Soft, NT, ND +BS, g- tube in place, dressing cdi  Extremities: b/l ue edema, + peripheral pulses    ASSESSMENT      Hypoxemia 2nd to covid pna   transaminitis  prediabetes  h/o appendectomy  cholecystectomy        PLAN    cont cont precautions  covid 5/14/21 neg noted above  d/c remdesevir given covid ab positive noted   completed dexamethasone   started pulse steroids for 3 days - 250mg solumedrol bid now tapered off 4/14/21   C/w prednisone 30 daily    cont on bactrim empirically, TIW   cont asa, vit c,    cont albuterol inhaler   pulm f/u  procalcitonin, D-dimer, crp, ldh, ferritin, lactate noted ,    cont tylenol prn,   cont robitussin prn   pt off precedex    pt methadone 30 q8   pain mgmt eval noted   Pt may have had benzo withdrawal as well as opioid withdrawal causing those symptoms.    Would dc fentanyl and give hydromorphone po as prn breakthrough.   cont phenylephrine drip for hypotension  hold clonidine   s/p intubation 3/29/21   s/p tracheostomy 4/21/21  O2 sat 100% (88% - 100%) mv 40%  O2 via mech vent and taper fio2 as tolerated   vent mgmt as per icu  xray 3/19/21 with pneumomediastinum  rept cxr with New trace right apical pneumothorax. New mild left apical pneumothorax. Grossly stable small pneumomediastinum.  Soft tissue emphysema at the neck bases bilaterally. Grossly stable bilateral pulmonary infiltrates noted.   cxr 2/24 with No evidence of pneumothorax can be appreciated on the available image. This may be related to patient positioning. Evidence of pneumomediastinum and subcutaneous emphysema in the lower neck is again   noted. There are patchy bibasilar infiltrates and elevated right hemidiaphragm noted.   cxr 3/29/21 with No significant change bilateral infiltrates. There is a small simple left apical pneumothorax. No significant pleural effusion. Bilateral subcutaneous emphysema similar to prior.   XRs on 7AM and 5PM with no obvious increase of ptx  cxr 4/4/21 with Improving bilateral airspace disease noted    cxr 4/8/21 with Small left pneumothorax noted.  thoracic surg f/u   s/p L chest tube replacement 4/18/21 for recurrence of left pneumothorax   cxr 4/20/21 with Unchanged advanced infiltrates and catheter left chest tube noted   CXR 4/11/21 with : No interval change compared to one day prior. No pneumothorax noted.   unable to flush or aspirate tube fully, noted debris in tubing which was milked out.   Once material removed from tubing able to aspirated and flush fully. Also changed dressing on pigtail which appears to be in good position.   Monitor O2 status   s/p tracheostomy 4/21/21   cxr 4/21/21 with No evidence of active chest disease. Tracheostomy tube in place otherwise no significant change noted   cxr 4/25/21 with A 40% LEFT pneumothorax. LEFT multi-sidehole pigtail catheter overlies LEFT lower hemithorax.. Bilateral multifocal and diffuse ill-defined airspace opacities..  Follow-up AP portable chest radiograph 4/25/2021 AT 8:58 AM: Residual LEFT 30% upper zone pneumothorax. Otherwise no interval change.noted    cxr 4/30/21 Tracheostomy tube again noted. Left chest tube noted with small left apical pneumothorax unchanged. Bilateral airspace opacities overall worsening. Heart size cannot be accurately assessed in this projection noted.   cxr 5/3/21 with Large left pneumothorax noted above.   cxr 5 4/21 with No significant interval change noted above.   ct chest with Extensive chronic appearing consolidative opacities throughout the lungs, most prominent in the left lower lobe and lingula, with bronchiectasis, scarring, and volume loss. Additional scattered peripheral coarse opacities.   Mild left pneumothorax. Left lateral pleural space pigtail catheter, not in continuity with the pneumothorax. Mild bilateral hydronephrosis, similar to appearance on CT of the abdomen and pelvis on April 27. noted above   s/p 2nd chest tube 5/5/21  cxr 5/6/21 with Tracheostomy and catheter left chest tubes remain. , There are significant diffuse advanced infiltrates again noted. No pneumothorax. Above findings are similar to study earlier in the day. Present film shows a left jugular line inserted   with tip entering the superior vena cava noted   cxr 5/11/21 with Heart magnified by technique. Tracheostomy, left jugular line, and 2 catheter left chest tubes remain. Quite advanced infiltration in the lungs particularly in the left lower lobe again noted.  There is a persistent rather small left apical pneumothorax. Chest is similar to May 10 noted    cxt 5/14/21 with Perihilar diffuse airspace disease and LEFT lower lobe retrocardiac airspace consolidation. LEFT chest tube overlies upper zone. Small LEFT apical less than 5% pneumothorax noted   cxr 5/17/21 with Similar infiltrates. Small left pneumothorax similar to the prior study noted above.  s/p surgical placement of gastrostomy tube 4/23/2021.   abd xray 5/10/21 with ileus noted   dressing changed daily for seroma  tube feeding resumed   gastrostomy put to suction   CT scan of the chest 5/13/21 demonstrates diffuse bilateral infiltrates with a region of consolidation at the left lung base. Small left pneumothorax. 2 left chest tubes noted in place noted above.  CT scan of the abdomen and pelvis 5/13/21 demonstrates diffuse dilatation of small and large bowel loops most consistent with ileus noted   xray abd with  5/14/21 with Ingested contrast within nonobstructed large bowel to the level of rectum. No acute radiographic intra-abdominal findings noted above.  id f/u   No need for further antibiotics as patient completed course of Meropenem  leukocytosis resolved  sputum cx with Enterobacter aerogenes (Carbapenem Resistant) noted above   tmx 101.3    ua positive    andrea changed  Completed  meropenem, s/p 1 dose of Vancomycin   zosyn and vanco resumed   lispro ss   prognosis poor   s/p 4 units prbc for anemia  f/u h/h    transfuse prbc as needed  heme onc f/u  retic is elevated.    Haptoglobin normal  ? daily phlebotomy  no hemolysis, Fe/B12/folate adequate  hemolysis is unlikely even his baseline haptoglobin may be very elevated due to COVID  direct pamella neg noted    supplement potassium as needed for hypokalemia  cont current meds

## 2021-05-19 NOTE — BH CONSULTATION LIAISON ASSESSMENT NOTE - NSBHCHARTREVIEWLAB_PSY_A_CORE FT
9.3    13.13 )-----------( 204      ( 19 May 2021 10:48 )             29.7   05-19    139  |  100  |  21<H>  ----------------------------<  168<H>  3.7   |  31  |  0.98    Ca    8.7      19 May 2021 10:48  Phos  2.9     05-18  Mg     1.3     05-19    TPro  6.0  /  Alb  3.0<L>  /  TBili  1.0  /  DBili  x   /  AST  16  /  ALT  27  /  AlkPhos  89  05-19

## 2021-05-19 NOTE — PROGRESS NOTE ADULT - ATTENDING COMMENTS
55 yr old  man , non smoker with  moody 1990s presented 3/14 with x9 days worsening cough, subjective fevers, and SOB, with x2-3 days dysuria and central, non-radiating, constant CP. Admitted to medicine unit  for acute hypoxic respiratory failure secondary to pna from covid-19 infection .     Assessment:  1. Acute hypoxic respiratory failure  2. Covid-19 infection   3. Transaminitis  4. Prediabetes  5. Bilateral pneumothorax  6. Septic shock - resolved  7. Anemia  8. bowel obstruction/ileus    Plan   -Difficult to control agitation/anxiety despite multiple medications  -Will request psych evaluation   -persistent left apical PTX, improved  -S/p tracheostomy placement 4/21   -S/p G-tube 4/23  -Cont. mechanical ventilation   -Daily weaning trials  -PT evaluation  -On antibiotics again, CVC changed, cdiff sent   -tube feedings, hold bowel regimen  -dvt/gi prophy  -hemodynamic monitoring   -Prognosis is guarded .

## 2021-05-19 NOTE — BH CONSULTATION LIAISON ASSESSMENT NOTE - NSBHCHARTREVIEWVS_PSY_A_CORE FT
Vital Signs Last 24 Hrs  T(C): 37.5 (19 May 2021 04:45), Max: 38 (18 May 2021 19:15)  T(F): 99.5 (19 May 2021 04:45), Max: 100.4 (18 May 2021 19:15)  HR: 70 (19 May 2021 15:46) (70 - 152)  BP: 80/49 (19 May 2021 12:15) (61/35 - 169/98)  BP(mean): 56 (19 May 2021 12:15) (40 - 115)  RR: 32 (19 May 2021 12:30) (15 - 38)  SpO2: 99% (19 May 2021 15:46) (92% - 100%)

## 2021-05-19 NOTE — BH CONSULTATION LIAISON ASSESSMENT NOTE - HPI (INCLUDE ILLNESS QUALITY, SEVERITY, DURATION, TIMING, CONTEXT, MODIFYING FACTORS, ASSOCIATED SIGNS AND SYMPTOMS)
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 Psych consulted for med management due to difficulty weaning off sedation. Pt seen in his room in ICU for eval, encountered ventilated with trach in place, sleeping deeply and unarousable. Pt does not open his eyes, speak or follow any commands.

## 2021-05-19 NOTE — BH CONSULTATION LIAISON ASSESSMENT NOTE - VIOLENCE RISK FACTORS:
April 17, 2019      [facility]  [address] [facility]  [place] [address]  Phone: [phone]  Fax: [phone]       Patient: Nic Subramanian   YOB: 2011  Date of Visit: 04/17/2019    To Whom It May Concern:    Azeb Subramanian  was at [facility] on 04/17/2019. He may return to work/school on 4/18/2019 with restrictions (no PE or sports for 2 weeks). If you have any questions or concerns, or if I can be of further assistance, please do not hesitate to contact me.    Sincerely,    Margarita Hamilton MD      None Known

## 2021-05-19 NOTE — BH CONSULTATION LIAISON ASSESSMENT NOTE - SUMMARY
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 Psych consulted for med management due to difficulty weaning off sedation. On exam, pt is highly somnolent and unable to participate, but hx and current presentation appear highly c/w acute multifactorial delirium i/s/o AHRF as well as sedating effects of current medications (high-dose Klonopin, Seroquel and Ativan PRN). To better manage delirium with less sedation, we recommend immediate dose reduction for Klonopin to 1 mg q8h standing with plan to further taper, DC'ing Ativan PRN, DC'ing Seroquel and substituting Thorazine 25 mg q6h standing as alternate antipsychotic. We recommend Thorazine for this pt due to past favorable results in pts with acute delirium and hypoxia. Pt disposition will depend on clinical course, but there is currently no reason to believe that pt will require admission to IP psychiatry.

## 2021-05-19 NOTE — CHART NOTE - NSCHARTNOTEFT_GEN_A_CORE
Called by resident to re-evaluate anterior pigtail catheter  Patient seen and examined   CXR with persistent unchanged mild to mod apical pneumothorax  Distal tube flushed, +AL with improvement in o2 sat from 98 to 100    Daily CXR  Continue pigtail to -20 suction  Monitor O2 sat  Remainder of care per ICU team

## 2021-05-19 NOTE — PROGRESS NOTE ADULT - SUBJECTIVE AND OBJECTIVE BOX
Patient is a 55y old  Male who presents with a chief complaint of SOB (19 May 2021 07:49)  Patient is resting comfortable in bed in NAD. Ventilator dependent via trach. GT feeding.  INTERVAL HPI/OVERNIGHT EVENTS:      VITAL SIGNS:  T(F): 99.5 (05-19-21 @ 04:45)  HR: 84 (05-19-21 @ 09:05)  BP: 112/59 (05-19-21 @ 06:00)  RR: 32 (05-19-21 @ 06:00)  SpO2: 100% (05-19-21 @ 09:05)  Wt(kg): --  I&O's Detail    18 May 2021 07:01  -  19 May 2021 07:00  --------------------------------------------------------  IN:    Enteral Tube Flush: 400 mL    IV PiggyBack: 500 mL    IV PiggyBack: 300 mL    Jevity 1.5: 760 mL    Phenylephrine: 157.3 mL  Total IN: 2117.3 mL    OUT:    Chest Tube (mL): 0 mL    Indwelling Catheter - Urethral (mL): 1350 mL    Rectal Tube (mL): 700 mL  Total OUT: 2050 mL    Total NET: 67.3 mL        Mode: AC/ CMV (Assist Control/ Continuous Mandatory Ventilation)  RR (machine): 32  TV (machine): 400  FiO2: 40  PEEP: 5  ITime: 0.7  MAP: 13  PIP: 32        REVIEW OF SYSTEMS:    CONSTITUTIONAL:  No fevers, chills, sweats    HEENT:  Eyes:  No diplopia or blurred vision. ENT:  No earache, sore throat or runny nose.    CARDIOVASCULAR:  No pressure, squeezing, tightness, or heaviness about the chest; no palpitations.    RESPIRATORY:  Per HPI    GASTROINTESTINAL:  No abdominal pain, nausea, vomiting or diarrhea.    GENITOURINARY:  No dysuria, frequency or urgency.    NEUROLOGIC:  No paresthesias, fasciculations, seizures or weakness.    PSYCHIATRIC:  No disorder of thought or mood.      PHYSICAL EXAM:    Constitutional: Well developed and nourished  Eyes:Perrla  ENMT: normal  Neck:supple  Respiratory: good air entry  Cardiovascular: S1 S2 regular  Gastrointestinal: Soft, Non tender  Extremities: No edema  Vascular:normal  Neurological:Sedated  Musculoskeletal:Normal      MEDICATIONS  (STANDING):  ascorbic acid 1000 milliGRAM(s) Oral daily  BACItracin   Ointment 1 Application(s) Topical two times a day  chlorhexidine 0.12% Liquid 15 milliLiter(s) Oral Mucosa every 12 hours  chlorhexidine 2% Cloths 1 Application(s) Topical <User Schedule>  clonazePAM  Tablet 3 milliGRAM(s) Oral every 8 hours  cloNIDine 0.2 milliGRAM(s) Oral every 8 hours  enoxaparin Injectable 40 milliGRAM(s) SubCutaneous every 24 hours  gabapentin 100 milliGRAM(s) Oral every 8 hours  insulin lispro (ADMELOG) corrective regimen sliding scale   SubCutaneous every 6 hours  magnesium sulfate  IVPB 2 Gram(s) IV Intermittent once  methadone    Tablet 10 milliGRAM(s) Oral every 8 hours  metoclopramide   Syrup 5 milliGRAM(s) Oral every 8 hours  mupirocin 2% Ointment 1 Application(s) Both Nostrils two times a day  pantoprazole   Suspension 40 milliGRAM(s) Oral daily  phenylephrine    Infusion 1.5 MICROgram(s)/kG/Min (47.2 mL/Hr) IV Continuous <Continuous>  piperacillin/tazobactam IVPB.. 3.375 Gram(s) IV Intermittent every 8 hours  predniSONE   Tablet 30 milliGRAM(s) Oral daily  QUEtiapine 75 milliGRAM(s) Oral two times a day  trimethoprim  40 mG/sulfamethoxazole 200 mG Suspension 160 milliGRAM(s) Oral <User Schedule>  vancomycin  IVPB 1250 milliGRAM(s) IV Intermittent every 12 hours  vancomycin  IVPB        MEDICATIONS  (PRN):  acetaminophen    Suspension .. 650 milliGRAM(s) Oral every 12 hours PRN Temp greater or equal to 38C (100.4F)  ALBUTerol    90 MICROgram(s) HFA Inhaler 2 Puff(s) Inhalation every 6 hours PRN Shortness of Breath and/or Wheezing  artificial  tears Solution 1 Drop(s) Both EYES every 4 hours PRN Dry Eyes  fentaNYL    Injectable 50 MICROGram(s) IV Push every 6 hours PRN Severe Pain (7 - 10)  LORazepam   Injectable 2 milliGRAM(s) IV Push every 6 hours PRN Agitation  sodium chloride 0.9% lock flush 10 milliLiter(s) IV Push every 1 hour PRN Pre/post blood products, medications, blood draw, and to maintain line patency      Allergies    No Known Allergies    Intolerances        LABS:                        6.1    6.07  )-----------( 91       ( 19 May 2021 06:15 )             25.8     05-18    141  |  99  |  10  ----------------------------<  120<H>  3.6   |  38<H>  |  0.29<L>    Ca    9.0      18 May 2021 05:35  Phos  2.9     05-18  Mg     1.3     05-19    TPro  6.8  /  Alb  3.8  /  TBili  1.1  /  DBili  x   /  AST  18  /  ALT  38  /  AlkPhos  74  05-18        ABG - ( 19 May 2021 03:37 )  pH, Arterial: 7.44  pH, Blood: x     /  pCO2: 55    /  pO2: 67    / HCO3: 37    / Base Excess: 11.1  /  SaO2: 96                    CAPILLARY BLOOD GLUCOSE      POCT Blood Glucose.: 129 mg/dL (19 May 2021 06:04)  POCT Blood Glucose.: >600 mg/dL (19 May 2021 06:02)  POCT Blood Glucose.: 124 mg/dL (19 May 2021 00:46)  POCT Blood Glucose.: 141 mg/dL (18 May 2021 17:04)  POCT Blood Glucose.: 166 mg/dL (18 May 2021 11:24)        RADIOLOGY & ADDITIONAL TESTS:    CXR:    < from: Xray Chest 1 View- PORTABLE-Urgent (Xray Chest 1 View- PORTABLE-Urgent .) (05.18.21 @ 16:05) >  IMPRESSION: New left basilar pneumothorax identified status post removal of the pleural space drainage catheter from that region; no change in size of left apical pneumothorax.      < end of copied text >  Ct scan chest:    ekg;    echo:

## 2021-05-19 NOTE — BH CONSULTATION LIAISON ASSESSMENT NOTE - RISK ASSESSMENT
Risk factors: COVID-19 PNA with AHRF and delirium. Protective factors: Absent h/o SI/SA/SIB, stable domicile, supportive family, attachment to independence, help seeking. Acute risk level appears low at the time of assessment, but chronic risk may be mildly elevated due to above risk factors.

## 2021-05-19 NOTE — BH CONSULTATION LIAISON ASSESSMENT NOTE - NSBHCONSULTRECOMMENDOTHER_PSY_A_CORE FT
1. Reduce dose of Klonopin to 1 mg q8h standing (no PRNs), with plan to further taper as tolerated  2. DC Ativan PRN due to overlap with Klonopin  3. DC Seroquel and substitute Thorazine PO 25 mg q6h standing to better address delirium  4. For breakthrough agitation, recommend Thorazine PO 25 mg q8h PRN agitation  5. Continue delirium precautions: Frequent reorientation, familiar pictures and objects at bedside, natural light in daytime, consistent sleep/wake schedule, adequate hydration and nutrition, sensory aids (hearing aids, glasses) present if needed, minimizing noise and overstimulation, clustering care to minimize overnight interruptions, judicious use of deliriogenic medications (anticholinergics, benzodiazepines and opioid analgesics), minimize use of restraints  6. Medical management as directed by primary team  7. Psychiatry will continue to follow  8. Case d/w Dr. Mccrary of primary team    Paloma Cali MD  Director, Consultation-Liaison Psychiatry Service  [id number]

## 2021-05-19 NOTE — CHART NOTE - NSCHARTNOTEFT_GEN_A_CORE
==============  COLLATERAL INFO  ==============  NAME: Brennan Lau  NUMBER: [phone]  RELATIONSHIP: Brother  RELIABILITY: Reliability is good  COMMENTS: Brother did not express any safety concerns    Collateral obtained with assistance of [employer]  services;  [employer] # [id number]    Collateral obtained by brother who relays that at baseline patient is domiciled alone, has 1 child that lives in [facility], currently employed in a restaurant. At baseline patient is able to care for himself with no issues, enjoys going to the gym and [employer]. Patient has no past psychiatric history; no ED visits, inpatient admissions or outpatient treatment. Patient has no history of depression, anxiety or suicide attempts/gestures or ideations.  No history of violence, no history of legal issues. No history of drug or alcohol abuse.   Per collateral there is no family history of mental illness or substance abuse.  Per collateral patient has no access to weapons or guns.  Collateral last saw brother outside the hospital 3 months and stated patient was fine but medically and mentally.     COVID Exposure Screen-collateral    1. Has the patient had a COVID-19 test in the last 90 days? Yes, while in the hospital  2. Has the patient tested positive to COVID-19 antibodies? No  3. Has the patient received 2 doses of the COVID-19 vaccine? No  4. In the past 10 days has the patient been around anyone with a positive COVID-19 test? No  5. Has the patient been out of [place] within the last 10 days? no

## 2021-05-20 DIAGNOSIS — G93.40 ENCEPHALOPATHY, UNSPECIFIED: ICD-10-CM

## 2021-05-20 DIAGNOSIS — F05 DELIRIUM DUE TO KNOWN PHYSIOLOGICAL CONDITION: ICD-10-CM

## 2021-05-20 DIAGNOSIS — F11.23 OPIOID DEPENDENCE WITH WITHDRAWAL: ICD-10-CM

## 2021-05-20 LAB
ALBUMIN SERPL ELPH-MCNC: 3 G/DL — LOW (ref 3.5–5)
ALP SERPL-CCNC: 88 U/L — SIGNIFICANT CHANGE UP (ref 40–120)
ALT FLD-CCNC: 28 U/L DA — SIGNIFICANT CHANGE UP (ref 10–60)
ANION GAP SERPL CALC-SCNC: 7 MMOL/L — SIGNIFICANT CHANGE UP (ref 5–17)
AST SERPL-CCNC: 17 U/L — SIGNIFICANT CHANGE UP (ref 10–40)
BASE EXCESS BLDA CALC-SCNC: 10.8 MMOL/L — HIGH (ref -2–3)
BILIRUB SERPL-MCNC: 0.8 MG/DL — SIGNIFICANT CHANGE UP (ref 0.2–1.2)
BLOOD GAS COMMENTS ARTERIAL: SIGNIFICANT CHANGE UP
BUN SERPL-MCNC: 28 MG/DL — HIGH (ref 7–18)
CALCIUM SERPL-MCNC: 8.7 MG/DL — SIGNIFICANT CHANGE UP (ref 8.4–10.5)
CHLORIDE SERPL-SCNC: 100 MMOL/L — SIGNIFICANT CHANGE UP (ref 96–108)
CO2 SERPL-SCNC: 33 MMOL/L — HIGH (ref 22–31)
CREAT SERPL-MCNC: 1.18 MG/DL — SIGNIFICANT CHANGE UP (ref 0.5–1.3)
GLUCOSE BLDC GLUCOMTR-MCNC: 110 MG/DL — HIGH (ref 70–99)
GLUCOSE BLDC GLUCOMTR-MCNC: 119 MG/DL — HIGH (ref 70–99)
GLUCOSE BLDC GLUCOMTR-MCNC: 120 MG/DL — HIGH (ref 70–99)
GLUCOSE BLDC GLUCOMTR-MCNC: 86 MG/DL — SIGNIFICANT CHANGE UP (ref 70–99)
GLUCOSE SERPL-MCNC: 111 MG/DL — HIGH (ref 70–99)
HCO3 BLDA-SCNC: 35 MMOL/L — HIGH (ref 21–28)
HCT VFR BLD CALC: 25.6 % — LOW (ref 39–50)
HGB BLD-MCNC: 8.9 G/DL — LOW (ref 13–17)
HOROWITZ INDEX BLDA+IHG-RTO: 40 — SIGNIFICANT CHANGE UP
MAGNESIUM SERPL-MCNC: 2.9 MG/DL — HIGH (ref 1.6–2.6)
MCHC RBC-ENTMCNC: 34.8 GM/DL — SIGNIFICANT CHANGE UP (ref 32–36)
MCHC RBC-ENTMCNC: 36.8 PG — HIGH (ref 27–34)
MCV RBC AUTO: 105.8 FL — HIGH (ref 80–100)
NRBC # BLD: 0 /100 WBCS — SIGNIFICANT CHANGE UP (ref 0–0)
PCO2 BLDA: 44 MMHG — SIGNIFICANT CHANGE UP (ref 35–48)
PH BLDA: 7.51 — HIGH (ref 7.35–7.45)
PHOSPHATE SERPL-MCNC: 3.7 MG/DL — SIGNIFICANT CHANGE UP (ref 2.5–4.5)
PLATELET # BLD AUTO: 240 K/UL — SIGNIFICANT CHANGE UP (ref 150–400)
PO2 BLDA: 124 MMHG — HIGH (ref 83–108)
POTASSIUM SERPL-MCNC: 3.5 MMOL/L — SIGNIFICANT CHANGE UP (ref 3.5–5.3)
POTASSIUM SERPL-SCNC: 3.5 MMOL/L — SIGNIFICANT CHANGE UP (ref 3.5–5.3)
PROT SERPL-MCNC: 6.2 G/DL — SIGNIFICANT CHANGE UP (ref 6–8.3)
RBC # BLD: 2.42 M/UL — LOW (ref 4.2–5.8)
RBC # FLD: 20 % — HIGH (ref 10.3–14.5)
SAO2 % BLDA: 99 % — SIGNIFICANT CHANGE UP
SARS-COV-2 RNA SPEC QL NAA+PROBE: SIGNIFICANT CHANGE UP
SODIUM SERPL-SCNC: 140 MMOL/L — SIGNIFICANT CHANGE UP (ref 135–145)
WBC # BLD: 15.01 K/UL — HIGH (ref 3.8–10.5)
WBC # FLD AUTO: 15.01 K/UL — HIGH (ref 3.8–10.5)

## 2021-05-20 PROCEDURE — 74018 RADEX ABDOMEN 1 VIEW: CPT | Mod: 26

## 2021-05-20 PROCEDURE — 93010 ELECTROCARDIOGRAM REPORT: CPT

## 2021-05-20 PROCEDURE — 99233 SBSQ HOSP IP/OBS HIGH 50: CPT

## 2021-05-20 PROCEDURE — 71045 X-RAY EXAM CHEST 1 VIEW: CPT | Mod: 26

## 2021-05-20 PROCEDURE — 71045 X-RAY EXAM CHEST 1 VIEW: CPT | Mod: 26,77

## 2021-05-20 PROCEDURE — 99291 CRITICAL CARE FIRST HOUR: CPT

## 2021-05-20 RX ORDER — CLONAZEPAM 1 MG
2 TABLET ORAL EVERY 8 HOURS
Refills: 0 | Status: DISCONTINUED | OUTPATIENT
Start: 2021-05-20 | End: 2021-05-21

## 2021-05-20 RX ORDER — SODIUM CHLORIDE 9 MG/ML
1000 INJECTION, SOLUTION INTRAVENOUS ONCE
Refills: 0 | Status: COMPLETED | OUTPATIENT
Start: 2021-05-20 | End: 2021-05-20

## 2021-05-20 RX ORDER — LACTULOSE 10 G/15ML
10 SOLUTION ORAL DAILY
Refills: 0 | Status: DISCONTINUED | OUTPATIENT
Start: 2021-05-20 | End: 2021-05-26

## 2021-05-20 RX ORDER — METOCLOPRAMIDE HCL 10 MG
10 TABLET ORAL THREE TIMES A DAY
Refills: 0 | Status: DISCONTINUED | OUTPATIENT
Start: 2021-05-20 | End: 2021-05-24

## 2021-05-20 RX ORDER — MIDODRINE HYDROCHLORIDE 2.5 MG/1
10 TABLET ORAL EVERY 8 HOURS
Refills: 0 | Status: DISCONTINUED | OUTPATIENT
Start: 2021-05-20 | End: 2021-05-24

## 2021-05-20 RX ADMIN — PIPERACILLIN AND TAZOBACTAM 25 GRAM(S): 4; .5 INJECTION, POWDER, LYOPHILIZED, FOR SOLUTION INTRAVENOUS at 13:19

## 2021-05-20 RX ADMIN — PHENYLEPHRINE HYDROCHLORIDE 47.2 MICROGRAM(S)/KG/MIN: 10 INJECTION INTRAVENOUS at 10:03

## 2021-05-20 RX ADMIN — GABAPENTIN 100 MILLIGRAM(S): 400 CAPSULE ORAL at 21:51

## 2021-05-20 RX ADMIN — PIPERACILLIN AND TAZOBACTAM 25 GRAM(S): 4; .5 INJECTION, POWDER, LYOPHILIZED, FOR SOLUTION INTRAVENOUS at 05:06

## 2021-05-20 RX ADMIN — Medication 10 MILLIGRAM(S): at 13:26

## 2021-05-20 RX ADMIN — LACTULOSE 10 GRAM(S): 10 SOLUTION ORAL at 12:49

## 2021-05-20 RX ADMIN — Medication 10 MILLIGRAM(S): at 12:50

## 2021-05-20 RX ADMIN — ENOXAPARIN SODIUM 40 MILLIGRAM(S): 100 INJECTION SUBCUTANEOUS at 11:22

## 2021-05-20 RX ADMIN — CHLORHEXIDINE GLUCONATE 15 MILLILITER(S): 213 SOLUTION TOPICAL at 05:07

## 2021-05-20 RX ADMIN — Medication 2 MILLIGRAM(S): at 13:26

## 2021-05-20 RX ADMIN — MUPIROCIN 1 APPLICATION(S): 20 OINTMENT TOPICAL at 05:08

## 2021-05-20 RX ADMIN — Medication 10 MILLIGRAM(S): at 21:51

## 2021-05-20 RX ADMIN — Medication 1000 MILLIGRAM(S): at 11:34

## 2021-05-20 RX ADMIN — QUETIAPINE FUMARATE 75 MILLIGRAM(S): 200 TABLET, FILM COATED ORAL at 05:07

## 2021-05-20 RX ADMIN — GABAPENTIN 100 MILLIGRAM(S): 400 CAPSULE ORAL at 05:06

## 2021-05-20 RX ADMIN — PIPERACILLIN AND TAZOBACTAM 25 GRAM(S): 4; .5 INJECTION, POWDER, LYOPHILIZED, FOR SOLUTION INTRAVENOUS at 21:52

## 2021-05-20 RX ADMIN — Medication 25 MILLIGRAM(S): at 05:07

## 2021-05-20 RX ADMIN — Medication 1 APPLICATION(S): at 05:07

## 2021-05-20 RX ADMIN — Medication 5 MILLIGRAM(S): at 05:07

## 2021-05-20 RX ADMIN — QUETIAPINE FUMARATE 75 MILLIGRAM(S): 200 TABLET, FILM COATED ORAL at 17:27

## 2021-05-20 RX ADMIN — PHENYLEPHRINE HYDROCHLORIDE 47.2 MICROGRAM(S)/KG/MIN: 10 INJECTION INTRAVENOUS at 04:15

## 2021-05-20 RX ADMIN — Medication 2 MILLIGRAM(S): at 21:51

## 2021-05-20 RX ADMIN — CHLORHEXIDINE GLUCONATE 15 MILLILITER(S): 213 SOLUTION TOPICAL at 17:27

## 2021-05-20 RX ADMIN — MIDODRINE HYDROCHLORIDE 10 MILLIGRAM(S): 2.5 TABLET ORAL at 13:19

## 2021-05-20 RX ADMIN — PANTOPRAZOLE SODIUM 40 MILLIGRAM(S): 20 TABLET, DELAYED RELEASE ORAL at 11:22

## 2021-05-20 RX ADMIN — METHADONE HYDROCHLORIDE 10 MILLIGRAM(S): 40 TABLET ORAL at 05:07

## 2021-05-20 RX ADMIN — CHLORHEXIDINE GLUCONATE 1 APPLICATION(S): 213 SOLUTION TOPICAL at 05:08

## 2021-05-20 RX ADMIN — METHADONE HYDROCHLORIDE 10 MILLIGRAM(S): 40 TABLET ORAL at 13:19

## 2021-05-20 RX ADMIN — PHENYLEPHRINE HYDROCHLORIDE 47.2 MICROGRAM(S)/KG/MIN: 10 INJECTION INTRAVENOUS at 16:47

## 2021-05-20 RX ADMIN — MIDODRINE HYDROCHLORIDE 10 MILLIGRAM(S): 2.5 TABLET ORAL at 21:51

## 2021-05-20 RX ADMIN — Medication 1 APPLICATION(S): at 17:27

## 2021-05-20 RX ADMIN — GABAPENTIN 100 MILLIGRAM(S): 400 CAPSULE ORAL at 13:19

## 2021-05-20 RX ADMIN — MUPIROCIN 1 APPLICATION(S): 20 OINTMENT TOPICAL at 17:28

## 2021-05-20 RX ADMIN — SODIUM CHLORIDE 1000 MILLILITER(S): 9 INJECTION, SOLUTION INTRAVENOUS at 11:16

## 2021-05-20 RX ADMIN — METHADONE HYDROCHLORIDE 10 MILLIGRAM(S): 40 TABLET ORAL at 21:51

## 2021-05-20 NOTE — ADVANCED PRACTICE NURSE CONSULT - RECOMMEDATIONS
Clean all wounds with normal saline and apply skin prep to the surrounding skin  -Clean the Bottom Lip with normal saline and pat dry  -Apply Vaseline or moisturizer b.i.d. PRN  -Provide oral care per protocol  -Apply Xeroform gauze to the L. Hand wound, cover with an ABD pad, wrap with Faviola, and secure with tape Daily PRN  -Continue to elevate/float the patients heels using heel protectors and reposition the patient Q 2hrs using wedges or pillows   -Clean all wounds with normal saline and apply skin prep to the surrounding skin  -Clean the Bottom Lip with normal saline and pat dry  -Apply Vaseline or moisturizer b.i.d. PRN  -Provide oral care per protocol  -Apply MediHoney ointment to the L. Hand and cover with a non-adherent dry dressing Q 48hrs PRN  -Continue to elevate/float the patients heels using heel protectors and reposition the patient Q 2hrs using wedges or pillows

## 2021-05-20 NOTE — PROGRESS NOTE ADULT - SUBJECTIVE AND OBJECTIVE BOX
Patient is a 55y old  Male who presents with a chief complaint of SOB (19 May 2021 13:05)    pt seen in icu [  ], reg med floor [   ], bed [  ], chair at bedside [   ], a+o x3 [  ], lethargic [  ],  nad [  ]    andrea [  ], ngt [  ], peg [  ], et tube [  ], cent line [  ], picc line [  ]        Allergies    No Known Allergies        Vitals    T(F): 98.6 (05-20-21 @ 04:00), Max: 98.9 (05-19-21 @ 12:15)  HR: 84 (05-20-21 @ 06:30) (44 - 99)  BP: 110/65 (05-20-21 @ 06:30) (61/35 - 139/70)  RR: 25 (05-20-21 @ 06:30) (17 - 32)  SpO2: 100% (05-20-21 @ 06:30) (97% - 100%)  Wt(kg): --  CAPILLARY BLOOD GLUCOSE      POCT Blood Glucose.: 119 mg/dL (20 May 2021 05:13)      Labs                          8.9    15.01 )-----------( 240      ( 20 May 2021 04:26 )             25.6       05-20    140  |  100  |  28<H>  ----------------------------<  111<H>  3.5   |  33<H>  |  1.18    Ca    8.7      20 May 2021 04:26  Phos  3.7     05-20  Mg     2.9     05-20    TPro  6.2  /  Alb  3.0<L>  /  TBili  0.8  /  DBili  x   /  AST  17  /  ALT  28  /  AlkPhos  88  05-20            .Sputum Sputum  04-16 @ 04:42   Moderate Enterobacter aerogenes (Carbapenem Resistant)  Normal Respiratory Monserrat present  --  Enterobacter aerogenes (Carbapenem Resistant)      .Urine Clean Catch (Midstream)  03-15 @ 00:52   No growth  --  --          Radiology Results      Meds    MEDICATIONS  (STANDING):  ascorbic acid 1000 milliGRAM(s) Oral daily  BACItracin   Ointment 1 Application(s) Topical two times a day  chlorhexidine 0.12% Liquid 15 milliLiter(s) Oral Mucosa every 12 hours  chlorhexidine 2% Cloths 1 Application(s) Topical <User Schedule>  enoxaparin Injectable 40 milliGRAM(s) SubCutaneous every 24 hours  gabapentin 100 milliGRAM(s) Oral every 8 hours  insulin lispro (ADMELOG) corrective regimen sliding scale   SubCutaneous every 6 hours  methadone    Tablet 10 milliGRAM(s) Oral every 8 hours  metoclopramide   Syrup 5 milliGRAM(s) Oral every 8 hours  mupirocin 2% Ointment 1 Application(s) Both Nostrils two times a day  pantoprazole   Suspension 40 milliGRAM(s) Oral daily  phenylephrine    Infusion 1.5 MICROgram(s)/kG/Min (47.2 mL/Hr) IV Continuous <Continuous>  piperacillin/tazobactam IVPB.. 3.375 Gram(s) IV Intermittent every 8 hours  predniSONE   Tablet 25 milliGRAM(s) Oral daily  QUEtiapine 75 milliGRAM(s) Oral two times a day  trimethoprim  40 mG/sulfamethoxazole 200 mG Suspension 160 milliGRAM(s) Oral <User Schedule>      MEDICATIONS  (PRN):  acetaminophen    Suspension .. 650 milliGRAM(s) Oral every 12 hours PRN Temp greater or equal to 38C (100.4F)  ALBUTerol    90 MICROgram(s) HFA Inhaler 2 Puff(s) Inhalation every 6 hours PRN Shortness of Breath and/or Wheezing  artificial  tears Solution 1 Drop(s) Both EYES every 4 hours PRN Dry Eyes  LORazepam   Injectable 2 milliGRAM(s) IV Push every 6 hours PRN Agitation  sodium chloride 0.9% lock flush 10 milliLiter(s) IV Push every 1 hour PRN Pre/post blood products, medications, blood draw, and to maintain line patency      Physical Exam    Neuro :  no focal deficits  Respiratory: CTA B/L  CV: RRR, S1S2, no murmurs,   Abdominal: Soft, NT, ND +BS,  Extremities: No edema, + peripheral pulses    ASSESSMENT    Hypoxemia    No pertinent past medical history    No significant past surgical history    S/P moody    S/P appendectomy        PLAN     Patient is a 55y old  Male who presents with a chief complaint of SOB (19 May 2021 13:05)    pt seen in icu [ x ], reg med floor [   ], bed [ x ], chair at bedside [   ], awake and responsive [  ], mildly sedated, [ x ],    nad [x  ]        Allergies    No Known Allergies        Vitals    T(F): 98.6 (05-20-21 @ 04:00), Max: 98.9 (05-19-21 @ 12:15)  HR: 84 (05-20-21 @ 06:30) (44 - 99)  BP: 110/65 (05-20-21 @ 06:30) (61/35 - 139/70)  RR: 25 (05-20-21 @ 06:30) (17 - 32)  SpO2: 100% (05-20-21 @ 06:30) (97% - 100%)  Wt(kg): --  CAPILLARY BLOOD GLUCOSE      POCT Blood Glucose.: 119 mg/dL (20 May 2021 05:13)      Labs                          8.9    15.01 )-----------( 240      ( 20 May 2021 04:26 )             25.6       05-20    140  |  100  |  28<H>  ----------------------------<  111<H>  3.5   |  33<H>  |  1.18    Ca    8.7      20 May 2021 04:26  Phos  3.7     05-20  Mg     2.9     05-20    TPro  6.2  /  Alb  3.0<L>  /  TBili  0.8  /  DBili  x   /  AST  17  /  ALT  28  /  AlkPhos  88  05-20            .Sputum Sputum  04-16 @ 04:42   Moderate Enterobacter aerogenes (Carbapenem Resistant)  Normal Respiratory Monserrat present  --  Enterobacter aerogenes (Carbapenem Resistant)      .Urine Clean Catch (Midstream)  03-15 @ 00:52   No growth  --  --          Radiology Results    < from: Xray Chest 1 View-PORTABLE IMMEDIATE (Xray Chest 1 View-PORTABLE IMMEDIATE .) (05.19.21 @ 11:35) >  IMPRESSION: Persistent mild to moderate left pneumothorax despite chest tube.    < end of copied text >      Meds    MEDICATIONS  (STANDING):  ascorbic acid 1000 milliGRAM(s) Oral daily  BACItracin   Ointment 1 Application(s) Topical two times a day  chlorhexidine 0.12% Liquid 15 milliLiter(s) Oral Mucosa every 12 hours  chlorhexidine 2% Cloths 1 Application(s) Topical <User Schedule>  enoxaparin Injectable 40 milliGRAM(s) SubCutaneous every 24 hours  gabapentin 100 milliGRAM(s) Oral every 8 hours  insulin lispro (ADMELOG) corrective regimen sliding scale   SubCutaneous every 6 hours  methadone    Tablet 10 milliGRAM(s) Oral every 8 hours  metoclopramide   Syrup 5 milliGRAM(s) Oral every 8 hours  mupirocin 2% Ointment 1 Application(s) Both Nostrils two times a day  pantoprazole   Suspension 40 milliGRAM(s) Oral daily  phenylephrine    Infusion 1.5 MICROgram(s)/kG/Min (47.2 mL/Hr) IV Continuous <Continuous>  piperacillin/tazobactam IVPB.. 3.375 Gram(s) IV Intermittent every 8 hours  predniSONE   Tablet 25 milliGRAM(s) Oral daily  QUEtiapine 75 milliGRAM(s) Oral two times a day  trimethoprim  40 mG/sulfamethoxazole 200 mG Suspension 160 milliGRAM(s) Oral <User Schedule>      MEDICATIONS  (PRN):  acetaminophen    Suspension .. 650 milliGRAM(s) Oral every 12 hours PRN Temp greater or equal to 38C (100.4F)  ALBUTerol    90 MICROgram(s) HFA Inhaler 2 Puff(s) Inhalation every 6 hours PRN Shortness of Breath and/or Wheezing  artificial  tears Solution 1 Drop(s) Both EYES every 4 hours PRN Dry Eyes  LORazepam   Injectable 2 milliGRAM(s) IV Push every 6 hours PRN Agitation  sodium chloride 0.9% lock flush 10 milliLiter(s) IV Push every 1 hour PRN Pre/post blood products, medications, blood draw, and to maintain line patency      Physical Exam    Neuro :  no focal deficits  Respiratory: CTA B/L  CV: RRR, S1S2, no murmurs,   Abdominal: Soft, NT, ND +BS,  Extremities: No edema, + peripheral pulses      ASSESSMENT      Hypoxemia 2nd to covid pna   transaminitis  prediabetes  h/o appendectomy  cholecystectomy        PLAN    cont cont precautions  covid 5/14/21 neg noted above  d/c remdesevir given covid ab positive noted   completed dexamethasone   started pulse steroids for 3 days - 250mg solumedrol bid now tapered off 4/14/21   C/w prednisone 25 daily    cont on bactrim empirically, TIW   cont asa, vit c,    cont albuterol inhaler   pulm f/u  procalcitonin, D-dimer, crp, ldh, ferritin, lactate noted ,    cont tylenol prn,   cont robitussin prn   pt off precedex    pt methadone 30 q8   pain mgmt eval noted   Pt may have had benzo withdrawal as well as opioid withdrawal causing those symptoms.    Would dc fentanyl and give hydromorphone po as prn breakthrough.   cont phenylephrine drip for hypotension  hold clonidine   s/p intubation 3/29/21   s/p tracheostomy 4/21/21  O2 sat 100% (97% - 100%) mv 40%  O2 via mech vent and taper fio2 as tolerated   vent mgmt as per icu  xray 3/19/21 with pneumomediastinum  rept cxr with New trace right apical pneumothorax. New mild left apical pneumothorax. Grossly stable small pneumomediastinum.  Soft tissue emphysema at the neck bases bilaterally. Grossly stable bilateral pulmonary infiltrates noted.   cxr 2/24 with No evidence of pneumothorax can be appreciated on the available image. This may be related to patient positioning. Evidence of pneumomediastinum and subcutaneous emphysema in the lower neck is again   noted. There are patchy bibasilar infiltrates and elevated right hemidiaphragm noted.   cxr 3/29/21 with No significant change bilateral infiltrates. There is a small simple left apical pneumothorax. No significant pleural effusion. Bilateral subcutaneous emphysema similar to prior.   XRs on 7AM and 5PM with no obvious increase of ptx  cxr 4/4/21 with Improving bilateral airspace disease noted    cxr 4/8/21 with Small left pneumothorax noted.  thoracic surg f/u   s/p L chest tube replacement 4/18/21 for recurrence of left pneumothorax   cxr 4/20/21 with Unchanged advanced infiltrates and catheter left chest tube noted   CXR 4/11/21 with : No interval change compared to one day prior. No pneumothorax noted.   unable to flush or aspirate tube fully, noted debris in tubing which was milked out.   Once material removed from tubing able to aspirated and flush fully. Also changed dressing on pigtail which appears to be in good position.   Monitor O2 status   s/p tracheostomy 4/21/21   cxr 4/21/21 with No evidence of active chest disease. Tracheostomy tube in place otherwise no significant change noted   cxr 4/25/21 with A 40% LEFT pneumothorax. LEFT multi-sidehole pigtail catheter overlies LEFT lower hemithorax.. Bilateral multifocal and diffuse ill-defined airspace opacities..  Follow-up AP portable chest radiograph 4/25/2021 AT 8:58 AM: Residual LEFT 30% upper zone pneumothorax. Otherwise no interval change.noted    cxr 4/30/21 Tracheostomy tube again noted. Left chest tube noted with small left apical pneumothorax unchanged. Bilateral airspace opacities overall worsening. Heart size cannot be accurately assessed in this projection noted.   cxr 5/3/21 with Large left pneumothorax noted above.   cxr 5 4/21 with No significant interval change noted above.   ct chest with Extensive chronic appearing consolidative opacities throughout the lungs, most prominent in the left lower lobe and lingula, with bronchiectasis, scarring, and volume loss. Additional scattered peripheral coarse opacities.   Mild left pneumothorax. Left lateral pleural space pigtail catheter, not in continuity with the pneumothorax. Mild bilateral hydronephrosis, similar to appearance on CT of the abdomen and pelvis on April 27. noted above   s/p 2nd chest tube 5/5/21  cxr 5/6/21 with Tracheostomy and catheter left chest tubes remain. , There are significant diffuse advanced infiltrates again noted. No pneumothorax. Above findings are similar to study earlier in the day. Present film shows a left jugular line inserted   with tip entering the superior vena cava noted   cxr 5/11/21 with Heart magnified by technique. Tracheostomy, left jugular line, and 2 catheter left chest tubes remain. Quite advanced infiltration in the lungs particularly in the left lower lobe again noted.  There is a persistent rather small left apical pneumothorax. Chest is similar to May 10 noted    cxt 5/14/21 with Perihilar diffuse airspace disease and LEFT lower lobe retrocardiac airspace consolidation. LEFT chest tube overlies upper zone. Small LEFT apical less than 5% pneumothorax noted   cxr 5/17/21 with Similar infiltrates. Small left pneumothorax similar to the prior study noted.   cxr 5/19/21 with Persistent mild to moderate left pneumothorax despite chest tube noted above.  s/p surgical placement of gastrostomy tube 4/23/2021.   abd xray 5/10/21 with ileus noted   dressing changed daily for seroma  tube feeding resumed   gastrostomy put to suction   CT scan of the chest 5/13/21 demonstrates diffuse bilateral infiltrates with a region of consolidation at the left lung base. Small left pneumothorax. 2 left chest tubes noted in place noted above.  CT scan of the abdomen and pelvis 5/13/21 demonstrates diffuse dilatation of small and large bowel loops most consistent with ileus noted   xray abd with  5/14/21 with Ingested contrast within nonobstructed large bowel to the level of rectum. No acute radiographic intra-abdominal findings noted above.  id f/u   No need for further antibiotics as patient completed course of Meropenem  leukocytosis resolved  sputum cx with Enterobacter aerogenes (Carbapenem Resistant) noted above   afebrile   ua positive    andrea changed  Completed  meropenem, s/p 1 dose of Vancomycin   cont zosyn  lispro ss   prognosis poor   s/p 4 units prbc for anemia  f/u h/h    transfuse prbc as needed  heme onc f/u  retic is elevated.    Haptoglobin normal  ? daily phlebotomy  no hemolysis, Fe/B12/folate adequate  hemolysis is unlikely even his baseline haptoglobin may be very elevated due to COVID  direct pamella neg noted    supplement potassium as needed for hypokalemia   psych cons noted   Reduce dose of Klonopin to 1 mg q8h standing (no PRNs), with plan to further taper as tolerated  DC Ativan PRN due to overlap with Klonopin  DC Seroquel and substitute Thorazine PO 25 mg q6h standing to better address delirium  For breakthrough agitation, recommend Thorazine PO 25 mg q8h PRN agitation  Continue delirium precautions: Frequent reorientation, familiar pictures and objects at bedside, natural light in daytime,   consistent sleep/wake schedule, adequate hydration and nutrition, sensory aids (hearing aids, glasses) present if needed, minimizing noise and overstimulation,   clustering care to minimize overnight interruptions, judicious use of deliriogenic medications (anticholinergics, benzodiazepines and opioid analgesics), minimize use of restraints  cont current meds

## 2021-05-20 NOTE — PROGRESS NOTE ADULT - SUBJECTIVE AND OBJECTIVE BOX
Pt seen at bedside  Patient is a 55y old  Male who presents with a chief complaint of SOB (20 May 2021 08:06)      INTERVAL HPI/OVERNIGHT EVENTS:  Per ICU, pt with worsening basilar pneumothorax on CXR this AM  Pt without complaint.     Vital Signs Last 24 Hrs  T(C): 36.9 (20 May 2021 07:00), Max: 37.2 (19 May 2021 12:15)  T(F): 98.5 (20 May 2021 07:00), Max: 98.9 (19 May 2021 12:15)  HR: 125 (20 May 2021 09:00) (44 - 125)  BP: 156/69 (20 May 2021 09:00) (74/37 - 156/69)  BP(mean): 90 (20 May 2021 09:00) (46 - 101)  RR: 31 (20 May 2021 09:00) (17 - 36)  SpO2: 90% (20 May 2021 09:00) (90% - 100%) -98% at bedside    Physical Exam:    Gen: awake, alert no acute distress  HEENT: tracheostomy in place, vented  Chest: L pigtail catheter in place, on suction, minimal output. NO air leak noted        MEDICATIONS  (STANDING):  ascorbic acid 1000 milliGRAM(s) Oral daily  BACItracin   Ointment 1 Application(s) Topical two times a day  chlorhexidine 0.12% Liquid 15 milliLiter(s) Oral Mucosa every 12 hours  chlorhexidine 2% Cloths 1 Application(s) Topical <User Schedule>  clonazePAM  Tablet 2 milliGRAM(s) Oral every 8 hours  enoxaparin Injectable 40 milliGRAM(s) SubCutaneous every 24 hours  gabapentin 100 milliGRAM(s) Oral every 8 hours  insulin lispro (ADMELOG) corrective regimen sliding scale   SubCutaneous every 6 hours  lactated ringers Bolus 1000 milliLiter(s) IV Bolus once  methadone    Tablet 10 milliGRAM(s) Oral every 8 hours  metoclopramide   Syrup 5 milliGRAM(s) Oral every 8 hours  metoclopramide Injectable 10 milliGRAM(s) IV Push three times a day  midodrine 10 milliGRAM(s) Oral every 8 hours  mupirocin 2% Ointment 1 Application(s) Both Nostrils two times a day  pantoprazole   Suspension 40 milliGRAM(s) Oral daily  phenylephrine    Infusion 1.5 MICROgram(s)/kG/Min (47.2 mL/Hr) IV Continuous <Continuous>  piperacillin/tazobactam IVPB.. 3.375 Gram(s) IV Intermittent every 8 hours  predniSONE   Tablet 25 milliGRAM(s) Oral daily  QUEtiapine 75 milliGRAM(s) Oral two times a day  trimethoprim  40 mG/sulfamethoxazole 200 mG Suspension 160 milliGRAM(s) Oral <User Schedule>    MEDICATIONS  (PRN):  acetaminophen    Suspension .. 650 milliGRAM(s) Oral every 12 hours PRN Temp greater or equal to 38C (100.4F)  ALBUTerol    90 MICROgram(s) HFA Inhaler 2 Puff(s) Inhalation every 6 hours PRN Shortness of Breath and/or Wheezing  artificial  tears Solution 1 Drop(s) Both EYES every 4 hours PRN Dry Eyes  LORazepam   Injectable 2 milliGRAM(s) IV Push every 6 hours PRN Agitation  sodium chloride 0.9% lock flush 10 milliLiter(s) IV Push every 1 hour PRN Pre/post blood products, medications, blood draw, and to maintain line patency      Labs:                          8.9    15.01 )-----------( 240      ( 20 May 2021 04:26 )             25.6     05-20    140  |  100  |  28<H>  ----------------------------<  111<H>  3.5   |  33<H>  |  1.18    Ca    8.7      20 May 2021 04:26  Phos  3.7     05-20  Mg     2.9     05-20    TPro  6.2  /  Alb  3.0<L>  /  TBili  0.8  /  DBili  x   /  AST  17  /  ALT  28  /  AlkPhos  88  05-20        I&O's Detail    19 May 2021 07:01  -  20 May 2021 07:00  --------------------------------------------------------  IN:    Albumin 5%  - 250 mL: 450 mL    Enteral Tube Flush: 120 mL    IV PiggyBack: 50 mL    IV PiggyBack: 250 mL    Jevity 1.5: 920 mL    Phenylephrine: 687.4 mL  Total IN: 2477.4 mL    OUT:    Chest Tube (mL): 0 mL    Indwelling Catheter - Urethral (mL): 705 mL    Rectal Tube (mL): 150 mL  Total OUT: 855 mL    Total NET: 1622.4 mL          Radiology:

## 2021-05-20 NOTE — BH CONSULTATION LIAISON PROGRESS NOTE - NSBHCONSULTRECOMMENDOTHER_PSY_A_CORE FT
1. Reduce dose of Klonopin to 2 mg q8h standing (no PRNs), with plan to further taper as tolerated  2. DC Ativan PRN due to overlap with Klonopin  3. Continue delirium precautions: Frequent reorientation, familiar pictures and objects at bedside, natural light in daytime, consistent sleep/wake schedule, adequate hydration and nutrition, sensory aids (hearing aids, glasses) present if needed, minimizing noise and overstimulation, clustering care to minimize overnight interruptions, judicious use of deliriogenic medications (anticholinergics, benzodiazepines and opioid analgesics), minimize use of restraints  4. Medical management as directed by primary team  5. Psychiatry will continue to follow  6. Case d/w ICU team    Paloma Cali MD  Director, Consultation-Liaison Psychiatry Service  [phone]

## 2021-05-20 NOTE — PROGRESS NOTE ADULT - SUBJECTIVE AND OBJECTIVE BOX
INTERVAL HPI/OVERNIGHT EVENTS: Patient was restarted on pheny as BP was on softer side. Psych was consulted, recommended some change in regimen. Patient is tolerating the current medications well. Clonidine was stopped due to hypotension.    PRESSORS: [x] YES [ ] NO  WHICH:pheny    ANTIBIOTICS:                      Antimicrobial:  piperacillin/tazobactam IVPB.. 3.375 Gram(s) IV Intermittent every 8 hours  trimethoprim  40 mG/sulfamethoxazole 200 mG Suspension 160 milliGRAM(s) Oral <User Schedule>    Cardiovascular:  phenylephrine    Infusion 1.5 MICROgram(s)/kG/Min IV Continuous <Continuous>    Pulmonary:  ALBUTerol    90 MICROgram(s) HFA Inhaler 2 Puff(s) Inhalation every 6 hours PRN    Hematalogic:  enoxaparin Injectable 40 milliGRAM(s) SubCutaneous every 24 hours    Other:  acetaminophen    Suspension .. 650 milliGRAM(s) Oral every 12 hours PRN  artificial  tears Solution 1 Drop(s) Both EYES every 4 hours PRN  ascorbic acid 1000 milliGRAM(s) Oral daily  BACItracin   Ointment 1 Application(s) Topical two times a day  chlorhexidine 0.12% Liquid 15 milliLiter(s) Oral Mucosa every 12 hours  chlorhexidine 2% Cloths 1 Application(s) Topical <User Schedule>  gabapentin 100 milliGRAM(s) Oral every 8 hours  insulin lispro (ADMELOG) corrective regimen sliding scale   SubCutaneous every 6 hours  LORazepam   Injectable 2 milliGRAM(s) IV Push every 6 hours PRN  methadone    Tablet 10 milliGRAM(s) Oral every 8 hours  metoclopramide   Syrup 5 milliGRAM(s) Oral every 8 hours  mupirocin 2% Ointment 1 Application(s) Both Nostrils two times a day  pantoprazole   Suspension 40 milliGRAM(s) Oral daily  predniSONE   Tablet 25 milliGRAM(s) Oral daily  QUEtiapine 75 milliGRAM(s) Oral two times a day  sodium chloride 0.9% lock flush 10 milliLiter(s) IV Push every 1 hour PRN    acetaminophen    Suspension .. 650 milliGRAM(s) Oral every 12 hours PRN  ALBUTerol    90 MICROgram(s) HFA Inhaler 2 Puff(s) Inhalation every 6 hours PRN  artificial  tears Solution 1 Drop(s) Both EYES every 4 hours PRN  ascorbic acid 1000 milliGRAM(s) Oral daily  BACItracin   Ointment 1 Application(s) Topical two times a day  chlorhexidine 0.12% Liquid 15 milliLiter(s) Oral Mucosa every 12 hours  chlorhexidine 2% Cloths 1 Application(s) Topical <User Schedule>  enoxaparin Injectable 40 milliGRAM(s) SubCutaneous every 24 hours  gabapentin 100 milliGRAM(s) Oral every 8 hours  insulin lispro (ADMELOG) corrective regimen sliding scale   SubCutaneous every 6 hours  LORazepam   Injectable 2 milliGRAM(s) IV Push every 6 hours PRN  methadone    Tablet 10 milliGRAM(s) Oral every 8 hours  metoclopramide   Syrup 5 milliGRAM(s) Oral every 8 hours  mupirocin 2% Ointment 1 Application(s) Both Nostrils two times a day  pantoprazole   Suspension 40 milliGRAM(s) Oral daily  phenylephrine    Infusion 1.5 MICROgram(s)/kG/Min IV Continuous <Continuous>  piperacillin/tazobactam IVPB.. 3.375 Gram(s) IV Intermittent every 8 hours  predniSONE   Tablet 25 milliGRAM(s) Oral daily  QUEtiapine 75 milliGRAM(s) Oral two times a day  sodium chloride 0.9% lock flush 10 milliLiter(s) IV Push every 1 hour PRN  trimethoprim  40 mG/sulfamethoxazole 200 mG Suspension 160 milliGRAM(s) Oral <User Schedule>    Drug Dosing Weight  Height (cm): 167.6 (2021 23:15)  Weight (kg): 83.9 (2021 23:15)  BMI (kg/m2): 29.9 (2021 23:15)  BSA (m2): 1.93 (2021 23:15)    CENTRAL LINE: [ ] YES [ ] NO  LOCATION:   DATE INSERTED:  REMOVE: [ ] YES [ ] NO  EXPLAIN:    RIVERA: [ ] YES [ ] NO    DATE INSERTED:  REMOVE:  [ ] YES [ ] NO  EXPLAIN:    A-LINE:  [ ] YES [ ] NO  LOCATION:   DATE INSERTED:  REMOVE:  [ ] YES [ ] NO  EXPLAIN:    PMH -reviewed admission note, no change since admission    ICU Vital Signs Last 24 Hrs  T(C): 37 (20 May 2021 04:00), Max: 37.2 (19 May 2021 12:15)  T(F): 98.6 (20 May 2021 04:00), Max: 98.9 (19 May 2021 12:15)  HR: 84 (20 May 2021 06:30) (44 - 99)  BP: 110/65 (20 May 2021 06:30) (61/35 - 139/70)  BP(mean): 74 (20 May 2021 06:30) (41 - 101)  ABP: --  ABP(mean): --  RR: 25 (20 May 2021 06:30) (17 - 32)  SpO2: 100% (20 May 2021 06:30) (97% - 100%)      ABG - ( 20 May 2021 04:39 )  pH, Arterial: 7.51  pH, Blood: x     /  pCO2: 44    /  pO2: 124   / HCO3: 35    / Base Excess: 10.8  /  SaO2: 99                    05-19 @ 07:01  -  05-20 @ 07:00  --------------------------------------------------------  IN: 2477.4 mL / OUT: 855 mL / NET: 1622.4 mL        Mode: AC/ CMV (Assist Control/ Continuous Mandatory Ventilation)  RR (machine): 32  TV (machine): 400  FiO2: 40  PEEP: 5  ITime: 1  MAP: 15  PIP: 35      PHYSICAL EXAM:    GENERAL: Patient seen resting in bed and in no apparent distress   HEAD: Atraumatic, Normocephalic  EYES: PERRL, conjunctiva and sclera clear  NECK: Supple, normal appearance   NERVOUS SYSTEM:  Awake + encephalopathy  Moving all 4 extremities, obeying commands  CHEST/LUN chest tube on L. No chest deformity; Crackles b/l. No, rhonchi, wheezing   HEART: Tachycardic Regular rhythm; No murmurs, rubs, or gallops  ABDOMEN: PEG in place. Soft, Bowel sounds present + PEG, mildly distended  EXTREMITIES:  left upper extremity edema, 2+, firm ,no tenderness  SKIN: Intact, No rashes or lesions  Rivera with pinkish urine      LABS:  CBC Full  -  ( 20 May 2021 04:26 )  WBC Count : 15.01 K/uL  RBC Count : 2.42 M/uL  Hemoglobin : 8.9 g/dL  Hematocrit : 25.6 %  Platelet Count - Automated : 240 K/uL  Mean Cell Volume : 105.8 fl  Mean Cell Hemoglobin : 36.8 pg  Mean Cell Hemoglobin Concentration : 34.8 gm/dL  Auto Neutrophil # : x  Auto Lymphocyte # : x  Auto Monocyte # : x  Auto Eosinophil # : x  Auto Basophil # : x  Auto Neutrophil % : x  Auto Lymphocyte % : x  Auto Monocyte % : x  Auto Eosinophil % : x  Auto Basophil % : x    05-20    140  |  100  |  28<H>  ----------------------------<  111<H>  3.5   |  33<H>  |  1.18    Ca    8.7      20 May 2021 04:26  Phos  3.7     05-20  Mg     2.9     05-20    TPro  6.2  /  Alb  3.0<L>  /  TBili  0.8  /  DBili  x   /  AST  17  /  ALT  28  /  AlkPhos  88  05-20            RADIOLOGY & ADDITIONAL STUDIES REVIEWED:  ***    GOALS OF CARE DISCUSSION WITH PATIENT/FAMILY/PROXY:    CRITICAL CARE TIME SPENT: 35 minutes

## 2021-05-20 NOTE — PROGRESS NOTE ADULT - ASSESSMENT
Assessment and plan: 54 y/o M with no PMH is admitted to ICU for AHRF 2/2 covid19 pna     1. Acute hypoxic respiratory failure  2. ARDS 2/2 Covid pneumonia  3. Pneumothorax  4. Prediabetes  5. Abnormal TSH     Neuro  -Alert and oriented x 3 at baseline   -Off all drips overnight, and calm  -C/w Klonopin to 3mg q8h  -C/w Seroquel 75 mg BID   -c/w methadone 10mg q8hr  -Ativan PRN, fentanyl PRN  -CT head unremarkable   -Low concern for malignant hypothermia, NMS, or serotonin syndrome given waxing/waning and lack of inciting medications    Cardiovascular  # Shock   Was hypotensive again, restarted pheny  Clonidine was d/c     Pulm  #Acute hypoxic respiratory failure: secondary to Covid19 pneumonia  #ARDS  -Was on HFNC then got intubated 3/29  -Trach 4/22 continues on Vent   - Remdesivir was discontinued due to positive antibodies   - Finished Dexamethasone   - Covid19 PCR negative now, off isolation   - Daily SBT, difficulty tolerating 2/2 to agitation  - RR inc to 32 o/n for CO2 retention    # Bacterial Pneumonia  - Stable infiltrate on CXR ,likely developed Bacterial pneumonia   - Completed ABx Zosyn, meropenem, s/p 1 dose of Vancomycin  - MRSA negative from 3/26  - Sputum Cx growing few gram negative rods, CRE - Contact isolation  - Taper prednisone to 30 daily (5/11) for possible organizing pneumonia   - C/w bactrim empirically, TIW for PCP PPX  - Secretions from the trach, needs frequent suctions   - Pulm. Dr. Ryan  - ID Dr Anand   - Was found to have fevers 5/17  Was started on vanc and zosyn  MRSA is negative, will d/c vanc  c/w zosyn     #Pneumothorax and pneumomediastinum:   -Thoracic surgery following  -s/p Chest tube placed 4/8 pigtail to wall suction and d/c on 4/15  -On 4/18 chest tube replaced for recurrence of PTX- c/w chest tube to suction     -anterior chest tube placed 5/6 for worsening pneumothorax with resolution of PTX  5/10 CXR with recurrence of apical pneumo - tube adjusted with improvement however still persisting but stable  -Repeat Chest CT shows persistent PTX  -Water seal inferior CT - PTX still stable  - Lateral Ct was removed, repeat CXr showed developing of PTX in left lower lobe  -Surgery was informed, following the case closely    ID  Likely bacterial pneumonia   -leukocytosis improved 9k  -Febrile overnight  - Was found to have fevers 5/17  Was started on vanc and zosyn  MRSA is negative, will d/c vanc        Nephro  -Pitting edema to both arms, ecchymosis on right AC, blisters on left arm around brachial area    -Was retaining urine, andrea was placed 5/5  -DC doxazosin for borderline BPs  -monitor I/Os   -Completed albumin  Metabolic alkalosis stable  Completed diamox   Hold Lasix given Ileus  On Maint IVF  potassium and phosphorus replaced this morning; monitor electrolytes     GI  Transaminitis:   likely secondary to Covid19, Resolved   hepatitis panel -ve  -continue to monitor LFT    Ileus  Abd xray with ileus, CT showing the same  Restart trickle feeds  G Tube off suction now  Rectal tube in place  C/w Miralax BID standing and reglan   G tube 4/23, - dressing changed daily for seroma  Ileus is improving on repeat xray abdomen    Heme  Elevated d-dimer: likely secondary to Covid19   -D-dimer 423 on admission  -Last D-dimer 1434  - No active bleeding, restarted lovenox daily    Anemia  S/p 4 PRBC total  - Now Hg stable 7-9  CTH and CT abdomen w/o contrast no evidence of bleed    No active signs of bleeding (No hematemesis, melena or hematuria)  Hemolysis w/u- negative  Iron studies show ACD  Dr Andrade on board   F/u CBC daily     Endocrine  Prediabetes:  -A1c 5.8  -BS controlled  -continue HSS    Abnormal TSH:  -TSH level noted 0.26,   -Repeat TSH 0.37 and Free T4 1.82    Skin/Catheters  No rashes. Peripheral IV lines.   LIJ  Both arms with edema, right AC with ecchymosis  doppler of LUE was negative    Prophylaxis   On Lovenox for DVT   Protonix for GI proph    GOC  FULL CODE

## 2021-05-20 NOTE — PROGRESS NOTE ADULT - ASSESSMENT
56yo M with persistent but stable left apical pneumothorax s/p left anterior pigtail 5/6, s/p  trach 4/21 now with increase in left basilar pneumothorax. Pt's O2 saturation is 98-99%.  1. Rec IR evaluation and placement of pigtail catheter  2. Will follow  3. D/w Dr. Lanier and agreed

## 2021-05-20 NOTE — ADVANCED PRACTICE NURSE CONSULT - ASSESSMENT
This is a 55yr old male patient admitted for Hypoxemia, presenting with a L. Hand Skin Tear with pink tissue and scant drainage This is a 55yr old male patient admitted for Hypoxemia, presenting with a L. Hand IV Infiltration wound with pink tissue, necrotic tissue, and scant drainage

## 2021-05-20 NOTE — PROGRESS NOTE ADULT - ATTENDING COMMENTS
55 yr old  man , non smoker with  moody 1990s presented 3/14 with x9 days worsening cough, subjective fevers, and SOB, with x2-3 days dysuria and central, non-radiating, constant CP. Admitted to medicine unit  for acute hypoxic respiratory failure secondary to pna from covid-19 infection .     Assessment:  1. Acute hypoxic respiratory failure  2. Covid-19 infection   3. Transaminitis  4. Prediabetes  5. Bilateral pneumothorax  6. Septic shock - resolved  7. Anemia  8. bowel obstruction/ileus    Plan   -Difficult to control agitation/anxiety despite multiple medications  -persistent left apical PTX, improved  -S/p tracheostomy placement 4/21   -S/p G-tube 4/23  -Cont. mechanical ventilation   -Daily weaning trials  -PT evaluation  -On antibiotics again, CVC changed, cdiff sent   -tube feedings, hold bowel regimen  -dvt/gi prophy  -hemodynamic monitoring   -Prognosis is guarded .

## 2021-05-20 NOTE — PROGRESS NOTE ADULT - SUBJECTIVE AND OBJECTIVE BOX
Patient is a 55y old  Male who presents with a chief complaint of SOB (20 May 2021 08:06)  Patient is laying in bed in NAD. Ventilator dependent via trach. Opens his eyes but does not follow  commands    INTERVAL HPI/OVERNIGHT EVENTS:      VITAL SIGNS:  T(F): 98.5 (05-20-21 @ 07:00)  HR: 125 (05-20-21 @ 09:00)  BP: 156/69 (05-20-21 @ 09:00)  RR: 31 (05-20-21 @ 09:00)  SpO2: 90% (05-20-21 @ 09:00)  Wt(kg): --  I&O's Detail    19 May 2021 07:01  -  20 May 2021 07:00  --------------------------------------------------------  IN:    Albumin 5%  - 250 mL: 450 mL    Enteral Tube Flush: 120 mL    IV PiggyBack: 50 mL    IV PiggyBack: 250 mL    Jevity 1.5: 920 mL    Phenylephrine: 687.4 mL  Total IN: 2477.4 mL    OUT:    Chest Tube (mL): 0 mL    Indwelling Catheter - Urethral (mL): 705 mL    Rectal Tube (mL): 150 mL  Total OUT: 855 mL    Total NET: 1622.4 mL        Mode: AC/ CMV (Assist Control/ Continuous Mandatory Ventilation)  RR (machine): 32  TV (machine): 400  FiO2: 40  PEEP: 5  ITime: 1  MAP: 14  PIP: 33        REVIEW OF SYSTEMS:    CONSTITUTIONAL:  No fevers, chills, sweats    HEENT:  Eyes:  No diplopia or blurred vision. ENT:  No earache, sore throat or runny nose.    CARDIOVASCULAR:  No pressure, squeezing, tightness, or heaviness about the chest; no palpitations.    RESPIRATORY:  Per HPI    GASTROINTESTINAL:  No abdominal pain, nausea, vomiting or diarrhea.    GENITOURINARY:  No dysuria, frequency or urgency.    NEUROLOGIC:  No paresthesias, fasciculations, seizures or weakness.    PSYCHIATRIC:  No disorder of thought or mood.      PHYSICAL EXAM:    Constitutional: Well developed and nourished  Eyes:Perrla  ENMT: normal  Neck:supple  Respiratory: good air entry  Cardiovascular: S1 S2 regular  Gastrointestinal: Soft, Non tender  Extremities: No edema  Vascular:normal  Neurological:Awake, not alert  Musculoskeletal:Normal      MEDICATIONS  (STANDING):  ascorbic acid 1000 milliGRAM(s) Oral daily  BACItracin   Ointment 1 Application(s) Topical two times a day  chlorhexidine 0.12% Liquid 15 milliLiter(s) Oral Mucosa every 12 hours  chlorhexidine 2% Cloths 1 Application(s) Topical <User Schedule>  clonazePAM  Tablet 2 milliGRAM(s) Oral every 8 hours  enoxaparin Injectable 40 milliGRAM(s) SubCutaneous every 24 hours  gabapentin 100 milliGRAM(s) Oral every 8 hours  insulin lispro (ADMELOG) corrective regimen sliding scale   SubCutaneous every 6 hours  lactated ringers Bolus 1000 milliLiter(s) IV Bolus once  methadone    Tablet 10 milliGRAM(s) Oral every 8 hours  metoclopramide   Syrup 5 milliGRAM(s) Oral every 8 hours  metoclopramide Injectable 10 milliGRAM(s) IV Push three times a day  midodrine 10 milliGRAM(s) Oral every 8 hours  mupirocin 2% Ointment 1 Application(s) Both Nostrils two times a day  pantoprazole   Suspension 40 milliGRAM(s) Oral daily  phenylephrine    Infusion 1.5 MICROgram(s)/kG/Min (47.2 mL/Hr) IV Continuous <Continuous>  piperacillin/tazobactam IVPB.. 3.375 Gram(s) IV Intermittent every 8 hours  predniSONE   Tablet 25 milliGRAM(s) Oral daily  QUEtiapine 75 milliGRAM(s) Oral two times a day  trimethoprim  40 mG/sulfamethoxazole 200 mG Suspension 160 milliGRAM(s) Oral <User Schedule>    MEDICATIONS  (PRN):  acetaminophen    Suspension .. 650 milliGRAM(s) Oral every 12 hours PRN Temp greater or equal to 38C (100.4F)  ALBUTerol    90 MICROgram(s) HFA Inhaler 2 Puff(s) Inhalation every 6 hours PRN Shortness of Breath and/or Wheezing  artificial  tears Solution 1 Drop(s) Both EYES every 4 hours PRN Dry Eyes  LORazepam   Injectable 2 milliGRAM(s) IV Push every 6 hours PRN Agitation  sodium chloride 0.9% lock flush 10 milliLiter(s) IV Push every 1 hour PRN Pre/post blood products, medications, blood draw, and to maintain line patency      Allergies    No Known Allergies    Intolerances        LABS:                        8.9    15.01 )-----------( 240      ( 20 May 2021 04:26 )             25.6     05-20    140  |  100  |  28<H>  ----------------------------<  111<H>  3.5   |  33<H>  |  1.18    Ca    8.7      20 May 2021 04:26  Phos  3.7     05-20  Mg     2.9     05-20    TPro  6.2  /  Alb  3.0<L>  /  TBili  0.8  /  DBili  x   /  AST  17  /  ALT  28  /  AlkPhos  88  05-20        ABG - ( 20 May 2021 04:39 )  pH, Arterial: 7.51  pH, Blood: x     /  pCO2: 44    /  pO2: 124   / HCO3: 35    / Base Excess: 10.8  /  SaO2: 99                    CAPILLARY BLOOD GLUCOSE      POCT Blood Glucose.: 119 mg/dL (20 May 2021 05:13)  POCT Blood Glucose.: 124 mg/dL (19 May 2021 23:00)  POCT Blood Glucose.: 183 mg/dL (19 May 2021 18:13)  POCT Blood Glucose.: 140 mg/dL (19 May 2021 13:49)        RADIOLOGY & ADDITIONAL TESTS:    CXR:  < from: Xray Chest 1 View-PORTABLE IMMEDIATE (Xray Chest 1 View-PORTABLE IMMEDIATE .) (05.19.21 @ 11:35) >  IMPRESSION: Persistent mild to moderate left pneumothorax despite chest tube.    < end of copied text >    Ct scan chest:    ekg;    echo:

## 2021-05-20 NOTE — BH CONSULTATION LIAISON PROGRESS NOTE - NSBHASSESSMENTFT_PSY_ALL_CORE
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 Psych consulted for med management due to difficulty weaning off sedation. On initial exam, pt is highly somnolent and unable to participate, but hx and current presentation appear highly c/w acute multifactorial delirium i/s/o AHRF, BZD withdrawal and opioid withdrawal. To better manage delirium with less sedation, we recommend dose reduction for Klonopin to 2 mg q8h standing with plan to further taper as tolerated. If pt tolerates Klonopin taper well, further changes will likely be recommended in near future, such as DC'ing Seroquel and substituting Thorazine as alternate antipsychotic. Pt disposition will depend on clinical course, but there is currently no reason to believe that pt will require admission to IP psychiatry.

## 2021-05-21 DIAGNOSIS — N17.9 ACUTE KIDNEY FAILURE, UNSPECIFIED: ICD-10-CM

## 2021-05-21 LAB
ALBUMIN SERPL ELPH-MCNC: 2.9 G/DL — LOW (ref 3.5–5)
ALP SERPL-CCNC: 70 U/L — SIGNIFICANT CHANGE UP (ref 40–120)
ALT FLD-CCNC: 24 U/L DA — SIGNIFICANT CHANGE UP (ref 10–60)
ANION GAP SERPL CALC-SCNC: 6 MMOL/L — SIGNIFICANT CHANGE UP (ref 5–17)
APPEARANCE UR: SIGNIFICANT CHANGE UP
AST SERPL-CCNC: 20 U/L — SIGNIFICANT CHANGE UP (ref 10–40)
BACTERIA # UR AUTO: ABNORMAL /HPF
BASE EXCESS BLDA CALC-SCNC: 3.5 MMOL/L — HIGH (ref -2–3)
BASE EXCESS BLDA CALC-SCNC: 7.7 MMOL/L — HIGH (ref -2–3)
BASE EXCESS BLDA CALC-SCNC: 8.8 MMOL/L — HIGH (ref -2–3)
BILIRUB SERPL-MCNC: 1 MG/DL — SIGNIFICANT CHANGE UP (ref 0.2–1.2)
BILIRUB UR-MCNC: NEGATIVE — SIGNIFICANT CHANGE UP
BLOOD GAS COMMENTS ARTERIAL: SIGNIFICANT CHANGE UP
BUN SERPL-MCNC: 25 MG/DL — HIGH (ref 7–18)
C DIFF BY PCR RESULT: SIGNIFICANT CHANGE UP
C DIFF TOX GENS STL QL NAA+PROBE: SIGNIFICANT CHANGE UP
CALCIUM SERPL-MCNC: 8.6 MG/DL — SIGNIFICANT CHANGE UP (ref 8.4–10.5)
CHLORIDE SERPL-SCNC: 102 MMOL/L — SIGNIFICANT CHANGE UP (ref 96–108)
CO2 SERPL-SCNC: 34 MMOL/L — HIGH (ref 22–31)
COLOR SPEC: YELLOW — SIGNIFICANT CHANGE UP
CREAT ?TM UR-MCNC: 30 MG/DL — SIGNIFICANT CHANGE UP
CREAT SERPL-MCNC: 1.31 MG/DL — HIGH (ref 0.5–1.3)
DIFF PNL FLD: ABNORMAL
EPI CELLS # UR: SIGNIFICANT CHANGE UP /HPF
GLUCOSE BLDC GLUCOMTR-MCNC: 103 MG/DL — HIGH (ref 70–99)
GLUCOSE BLDC GLUCOMTR-MCNC: 123 MG/DL — HIGH (ref 70–99)
GLUCOSE BLDC GLUCOMTR-MCNC: 84 MG/DL — SIGNIFICANT CHANGE UP (ref 70–99)
GLUCOSE SERPL-MCNC: 76 MG/DL — SIGNIFICANT CHANGE UP (ref 70–99)
GLUCOSE UR QL: NEGATIVE — SIGNIFICANT CHANGE UP
HCO3 BLDA-SCNC: 35 MMOL/L — HIGH (ref 21–28)
HCT VFR BLD CALC: 24.8 % — LOW (ref 39–50)
HGB BLD-MCNC: 8 G/DL — LOW (ref 13–17)
HOROWITZ INDEX BLDA+IHG-RTO: 40 — SIGNIFICANT CHANGE UP
KETONES UR-MCNC: NEGATIVE — SIGNIFICANT CHANGE UP
LEUKOCYTE ESTERASE UR-ACNC: NEGATIVE — SIGNIFICANT CHANGE UP
MAGNESIUM SERPL-MCNC: 2.4 MG/DL — SIGNIFICANT CHANGE UP (ref 1.6–2.6)
MCHC RBC-ENTMCNC: 32.3 GM/DL — SIGNIFICANT CHANGE UP (ref 32–36)
MCHC RBC-ENTMCNC: 34.2 PG — HIGH (ref 27–34)
MCV RBC AUTO: 106 FL — HIGH (ref 80–100)
NITRITE UR-MCNC: NEGATIVE — SIGNIFICANT CHANGE UP
NRBC # BLD: 0 /100 WBCS — SIGNIFICANT CHANGE UP (ref 0–0)
PCO2 BLDA: 51 MMHG — HIGH (ref 35–48)
PCO2 BLDA: 59 MMHG — HIGH (ref 35–48)
PCO2 BLDA: 94 MMHG — CRITICAL HIGH (ref 35–48)
PH BLDA: 7.18 — CRITICAL LOW (ref 7.35–7.45)
PH BLDA: 7.38 — SIGNIFICANT CHANGE UP (ref 7.35–7.45)
PH BLDA: 7.44 — SIGNIFICANT CHANGE UP (ref 7.35–7.45)
PH UR: 7 — SIGNIFICANT CHANGE UP (ref 5–8)
PHOSPHATE SERPL-MCNC: 2.8 MG/DL — SIGNIFICANT CHANGE UP (ref 2.5–4.5)
PLATELET # BLD AUTO: 175 K/UL — SIGNIFICANT CHANGE UP (ref 150–400)
PO2 BLDA: 108 MMHG — SIGNIFICANT CHANGE UP (ref 83–108)
PO2 BLDA: 132 MMHG — HIGH (ref 83–108)
PO2 BLDA: 83 MMHG — SIGNIFICANT CHANGE UP (ref 83–108)
POTASSIUM SERPL-MCNC: 3.4 MMOL/L — LOW (ref 3.5–5.3)
POTASSIUM SERPL-SCNC: 3.4 MMOL/L — LOW (ref 3.5–5.3)
POTASSIUM UR-SCNC: 12 MMOL/L — SIGNIFICANT CHANGE UP
PROT SERPL-MCNC: 5.7 G/DL — LOW (ref 6–8.3)
PROT UR-MCNC: 30 MG/DL
RBC # BLD: 2.34 M/UL — LOW (ref 4.2–5.8)
RBC # FLD: 17.8 % — HIGH (ref 10.3–14.5)
RBC CASTS # UR COMP ASSIST: >50 /HPF (ref 0–2)
SAO2 % BLDA: 100 % — SIGNIFICANT CHANGE UP
SAO2 % BLDA: 98 % — SIGNIFICANT CHANGE UP
SAO2 % BLDA: 99 % — SIGNIFICANT CHANGE UP
SODIUM SERPL-SCNC: 142 MMOL/L — SIGNIFICANT CHANGE UP (ref 135–145)
SODIUM UR-SCNC: 86 MMOL/L — SIGNIFICANT CHANGE UP
SP GR SPEC: 1.01 — SIGNIFICANT CHANGE UP (ref 1.01–1.02)
TRI-PHOS CRY UR QL COMP ASSIST: ABNORMAL /HPF
UROBILINOGEN FLD QL: NEGATIVE — SIGNIFICANT CHANGE UP
UUN UR-MCNC: 222 MG/DL — SIGNIFICANT CHANGE UP
WBC # BLD: 8.69 K/UL — SIGNIFICANT CHANGE UP (ref 3.8–10.5)
WBC # FLD AUTO: 8.69 K/UL — SIGNIFICANT CHANGE UP (ref 3.8–10.5)
WBC UR QL: SIGNIFICANT CHANGE UP /HPF (ref 0–5)

## 2021-05-21 PROCEDURE — 99232 SBSQ HOSP IP/OBS MODERATE 35: CPT

## 2021-05-21 PROCEDURE — 71045 X-RAY EXAM CHEST 1 VIEW: CPT | Mod: 26

## 2021-05-21 PROCEDURE — 74018 RADEX ABDOMEN 1 VIEW: CPT | Mod: 26

## 2021-05-21 RX ORDER — METHADONE HYDROCHLORIDE 40 MG/1
5 TABLET ORAL
Refills: 0 | Status: DISCONTINUED | OUTPATIENT
Start: 2021-05-21 | End: 2021-05-24

## 2021-05-21 RX ORDER — SODIUM CHLORIDE 9 MG/ML
1000 INJECTION INTRAMUSCULAR; INTRAVENOUS; SUBCUTANEOUS
Refills: 0 | Status: DISCONTINUED | OUTPATIENT
Start: 2021-05-21 | End: 2021-05-22

## 2021-05-21 RX ORDER — PIPERACILLIN AND TAZOBACTAM 4; .5 G/20ML; G/20ML
3.38 INJECTION, POWDER, LYOPHILIZED, FOR SOLUTION INTRAVENOUS EVERY 12 HOURS
Refills: 0 | Status: DISCONTINUED | OUTPATIENT
Start: 2021-05-21 | End: 2021-05-22

## 2021-05-21 RX ORDER — METHADONE HYDROCHLORIDE 40 MG/1
10 TABLET ORAL
Refills: 0 | Status: DISCONTINUED | OUTPATIENT
Start: 2021-05-21 | End: 2021-05-24

## 2021-05-21 RX ORDER — POTASSIUM CHLORIDE 20 MEQ
40 PACKET (EA) ORAL ONCE
Refills: 0 | Status: COMPLETED | OUTPATIENT
Start: 2021-05-21 | End: 2021-05-21

## 2021-05-21 RX ORDER — CLONAZEPAM 1 MG
1 TABLET ORAL EVERY 8 HOURS
Refills: 0 | Status: DISCONTINUED | OUTPATIENT
Start: 2021-05-21 | End: 2021-05-28

## 2021-05-21 RX ADMIN — PIPERACILLIN AND TAZOBACTAM 25 GRAM(S): 4; .5 INJECTION, POWDER, LYOPHILIZED, FOR SOLUTION INTRAVENOUS at 06:01

## 2021-05-21 RX ADMIN — Medication 10 MILLIGRAM(S): at 13:05

## 2021-05-21 RX ADMIN — GABAPENTIN 100 MILLIGRAM(S): 400 CAPSULE ORAL at 06:01

## 2021-05-21 RX ADMIN — LACTULOSE 10 GRAM(S): 10 SOLUTION ORAL at 11:41

## 2021-05-21 RX ADMIN — Medication 1 MILLIGRAM(S): at 13:05

## 2021-05-21 RX ADMIN — Medication 10 MILLIGRAM(S): at 21:21

## 2021-05-21 RX ADMIN — QUETIAPINE FUMARATE 75 MILLIGRAM(S): 200 TABLET, FILM COATED ORAL at 06:01

## 2021-05-21 RX ADMIN — PANTOPRAZOLE SODIUM 40 MILLIGRAM(S): 20 TABLET, DELAYED RELEASE ORAL at 11:41

## 2021-05-21 RX ADMIN — GABAPENTIN 100 MILLIGRAM(S): 400 CAPSULE ORAL at 13:04

## 2021-05-21 RX ADMIN — METHADONE HYDROCHLORIDE 10 MILLIGRAM(S): 40 TABLET ORAL at 21:21

## 2021-05-21 RX ADMIN — ENOXAPARIN SODIUM 40 MILLIGRAM(S): 100 INJECTION SUBCUTANEOUS at 11:40

## 2021-05-21 RX ADMIN — MUPIROCIN 1 APPLICATION(S): 20 OINTMENT TOPICAL at 18:10

## 2021-05-21 RX ADMIN — MIDODRINE HYDROCHLORIDE 10 MILLIGRAM(S): 2.5 TABLET ORAL at 13:04

## 2021-05-21 RX ADMIN — Medication 10 MILLIGRAM(S): at 06:03

## 2021-05-21 RX ADMIN — MIDODRINE HYDROCHLORIDE 10 MILLIGRAM(S): 2.5 TABLET ORAL at 06:01

## 2021-05-21 RX ADMIN — METHADONE HYDROCHLORIDE 10 MILLIGRAM(S): 40 TABLET ORAL at 06:01

## 2021-05-21 RX ADMIN — METHADONE HYDROCHLORIDE 5 MILLIGRAM(S): 40 TABLET ORAL at 13:04

## 2021-05-21 RX ADMIN — CHLORHEXIDINE GLUCONATE 15 MILLILITER(S): 213 SOLUTION TOPICAL at 05:41

## 2021-05-21 RX ADMIN — Medication 1000 MILLIGRAM(S): at 11:40

## 2021-05-21 RX ADMIN — QUETIAPINE FUMARATE 75 MILLIGRAM(S): 200 TABLET, FILM COATED ORAL at 18:03

## 2021-05-21 RX ADMIN — Medication 40 MILLIEQUIVALENT(S): at 06:00

## 2021-05-21 RX ADMIN — Medication 1 APPLICATION(S): at 18:09

## 2021-05-21 RX ADMIN — Medication 1 MILLIGRAM(S): at 21:21

## 2021-05-21 RX ADMIN — MUPIROCIN 1 APPLICATION(S): 20 OINTMENT TOPICAL at 05:42

## 2021-05-21 RX ADMIN — GABAPENTIN 100 MILLIGRAM(S): 400 CAPSULE ORAL at 21:21

## 2021-05-21 RX ADMIN — SODIUM CHLORIDE 50 MILLILITER(S): 9 INJECTION INTRAMUSCULAR; INTRAVENOUS; SUBCUTANEOUS at 12:01

## 2021-05-21 RX ADMIN — PIPERACILLIN AND TAZOBACTAM 200 GRAM(S): 4; .5 INJECTION, POWDER, LYOPHILIZED, FOR SOLUTION INTRAVENOUS at 18:04

## 2021-05-21 RX ADMIN — Medication 1 APPLICATION(S): at 05:41

## 2021-05-21 RX ADMIN — Medication 25 MILLIGRAM(S): at 06:01

## 2021-05-21 RX ADMIN — MIDODRINE HYDROCHLORIDE 10 MILLIGRAM(S): 2.5 TABLET ORAL at 21:21

## 2021-05-21 RX ADMIN — Medication 160 MILLIGRAM(S): at 09:43

## 2021-05-21 RX ADMIN — Medication 2 MILLIGRAM(S): at 06:01

## 2021-05-21 RX ADMIN — Medication 10 MILLIGRAM(S): at 11:40

## 2021-05-21 RX ADMIN — CHLORHEXIDINE GLUCONATE 1 APPLICATION(S): 213 SOLUTION TOPICAL at 05:41

## 2021-05-21 NOTE — PROGRESS NOTE ADULT - SUBJECTIVE AND OBJECTIVE BOX
INTERVAL HPI/OVERNIGHT EVENTS: ***    PRESSORS: [ ] YES [ ] NO  WHICH:    ANTIBIOTICS:                      Antimicrobial:  piperacillin/tazobactam IVPB.. 3.375 Gram(s) IV Intermittent every 8 hours  trimethoprim  40 mG/sulfamethoxazole 200 mG Suspension 160 milliGRAM(s) Oral <User Schedule>    Cardiovascular:  midodrine 10 milliGRAM(s) Oral every 8 hours  phenylephrine    Infusion 1.5 MICROgram(s)/kG/Min IV Continuous <Continuous>    Pulmonary:  ALBUTerol    90 MICROgram(s) HFA Inhaler 2 Puff(s) Inhalation every 6 hours PRN    Hematalogic:  enoxaparin Injectable 40 milliGRAM(s) SubCutaneous every 24 hours    Other:  acetaminophen    Suspension .. 650 milliGRAM(s) Oral every 12 hours PRN  artificial  tears Solution 1 Drop(s) Both EYES every 4 hours PRN  ascorbic acid 1000 milliGRAM(s) Oral daily  BACItracin   Ointment 1 Application(s) Topical two times a day  bisacodyl Suppository 10 milliGRAM(s) Rectal daily  chlorhexidine 0.12% Liquid 15 milliLiter(s) Oral Mucosa every 12 hours  chlorhexidine 2% Cloths 1 Application(s) Topical <User Schedule>  clonazePAM  Tablet 2 milliGRAM(s) Oral every 8 hours  gabapentin 100 milliGRAM(s) Oral every 8 hours  insulin lispro (ADMELOG) corrective regimen sliding scale   SubCutaneous every 6 hours  lactulose Syrup 10 Gram(s) Oral daily  LORazepam   Injectable 2 milliGRAM(s) IV Push every 6 hours PRN  methadone    Tablet 10 milliGRAM(s) Oral <User Schedule>  methadone    Tablet 5 milliGRAM(s) Oral <User Schedule>  metoclopramide Injectable 10 milliGRAM(s) IV Push three times a day  mupirocin 2% Ointment 1 Application(s) Both Nostrils two times a day  pantoprazole   Suspension 40 milliGRAM(s) Oral daily  predniSONE   Tablet 25 milliGRAM(s) Oral daily  QUEtiapine 75 milliGRAM(s) Oral two times a day  sodium chloride 0.9% lock flush 10 milliLiter(s) IV Push every 1 hour PRN    acetaminophen    Suspension .. 650 milliGRAM(s) Oral every 12 hours PRN  ALBUTerol    90 MICROgram(s) HFA Inhaler 2 Puff(s) Inhalation every 6 hours PRN  artificial  tears Solution 1 Drop(s) Both EYES every 4 hours PRN  ascorbic acid 1000 milliGRAM(s) Oral daily  BACItracin   Ointment 1 Application(s) Topical two times a day  bisacodyl Suppository 10 milliGRAM(s) Rectal daily  chlorhexidine 0.12% Liquid 15 milliLiter(s) Oral Mucosa every 12 hours  chlorhexidine 2% Cloths 1 Application(s) Topical <User Schedule>  clonazePAM  Tablet 2 milliGRAM(s) Oral every 8 hours  enoxaparin Injectable 40 milliGRAM(s) SubCutaneous every 24 hours  gabapentin 100 milliGRAM(s) Oral every 8 hours  insulin lispro (ADMELOG) corrective regimen sliding scale   SubCutaneous every 6 hours  lactulose Syrup 10 Gram(s) Oral daily  LORazepam   Injectable 2 milliGRAM(s) IV Push every 6 hours PRN  methadone    Tablet 10 milliGRAM(s) Oral <User Schedule>  methadone    Tablet 5 milliGRAM(s) Oral <User Schedule>  metoclopramide Injectable 10 milliGRAM(s) IV Push three times a day  midodrine 10 milliGRAM(s) Oral every 8 hours  mupirocin 2% Ointment 1 Application(s) Both Nostrils two times a day  pantoprazole   Suspension 40 milliGRAM(s) Oral daily  phenylephrine    Infusion 1.5 MICROgram(s)/kG/Min IV Continuous <Continuous>  piperacillin/tazobactam IVPB.. 3.375 Gram(s) IV Intermittent every 8 hours  predniSONE   Tablet 25 milliGRAM(s) Oral daily  QUEtiapine 75 milliGRAM(s) Oral two times a day  sodium chloride 0.9% lock flush 10 milliLiter(s) IV Push every 1 hour PRN  trimethoprim  40 mG/sulfamethoxazole 200 mG Suspension 160 milliGRAM(s) Oral <User Schedule>    Drug Dosing Weight  Height (cm): 167.6 (23 Apr 2021 23:15)  Weight (kg): 83.9 (23 Apr 2021 23:15)  BMI (kg/m2): 29.9 (23 Apr 2021 23:15)  BSA (m2): 1.93 (23 Apr 2021 23:15)    CENTRAL LINE: [ ] YES [ ] NO  LOCATION:   DATE INSERTED:  REMOVE: [ ] YES [ ] NO  EXPLAIN:    RIVERA: [ ] YES [ ] NO    DATE INSERTED:  REMOVE:  [ ] YES [ ] NO  EXPLAIN:    A-LINE:  [ ] YES [ ] NO  LOCATION:   DATE INSERTED:  REMOVE:  [ ] YES [ ] NO  EXPLAIN:    PMH -reviewed admission note, no change since admission    ICU Vital Signs Last 24 Hrs  T(C): 36.8 (21 May 2021 05:00), Max: 37.4 (20 May 2021 20:30)  T(F): 98.3 (21 May 2021 05:00), Max: 99.3 (20 May 2021 20:30)  HR: 62 (21 May 2021 06:00) (48 - 125)  BP: 104/56 (21 May 2021 06:00) (93/44 - 156/69)  BP(mean): 66 (21 May 2021 06:00) (56 - 105)  ABP: --  ABP(mean): --  RR: 32 (21 May 2021 06:00) (13 - 36)  SpO2: 100% (21 May 2021 06:00) (64% - 100%)      ABG - ( 21 May 2021 04:18 )  pH, Arterial: 7.44  pH, Blood: x     /  pCO2: 51    /  pO2: 132   / HCO3: 35    / Base Excess: 8.8   /  SaO2: 100                   05-20 @ 07:01  -  05-21 @ 07:00  --------------------------------------------------------  IN: 2256.2 mL / OUT: 1100 mL / NET: 1156.2 mL        Mode: AC/ CMV (Assist Control/ Continuous Mandatory Ventilation)  RR (machine): 32  TV (machine): 400  FiO2: 40  PEEP: 5  ITime: 1  MAP: 13  PIP: 31      PHYSICAL EXAM:    GENERAL: NAD, well-groomed, well-developed  HEAD:  Atraumatic, Normocephalic  EYES: EOMI, PERRLA, conjunctiva and sclera clear  ENMT: No tonsillar erythema, exudates, or enlargement; Moist mucous membranes, Good dentition, No lesions  NECK: Supple, normal appearance, No JVD; Normal thyroid; Trachea midline  NERVOUS SYSTEM:  Alert & Oriented X3, Good concentration; Motor Strength 5/5 B/L upper and lower extremities; DTRs 2+ intact and symmetric  CHEST/LUNG: No chest deformity; Normal percussion bilaterally; No rales, rhonchi, wheezing   HEART: Regular rate and rhythm; No murmurs, rubs, or gallops  ABDOMEN: Soft, Nontender, Nondistended; Bowel sounds present  EXTREMITIES:  2+ Peripheral Pulses, No clubbing, cyanosis, or edema  LYMPH: No lymphadenopathy noted  SKIN: No rashes or lesions; Good capillary refill      LABS:  CBC Full  -  ( 21 May 2021 04:55 )  WBC Count : 8.69 K/uL  RBC Count : 2.34 M/uL  Hemoglobin : 8.0 g/dL  Hematocrit : 24.8 %  Platelet Count - Automated : 175 K/uL  Mean Cell Volume : 106.0 fl  Mean Cell Hemoglobin : 34.2 pg  Mean Cell Hemoglobin Concentration : 32.3 gm/dL  Auto Neutrophil # : x  Auto Lymphocyte # : x  Auto Monocyte # : x  Auto Eosinophil # : x  Auto Basophil # : x  Auto Neutrophil % : x  Auto Lymphocyte % : x  Auto Monocyte % : x  Auto Eosinophil % : x  Auto Basophil % : x    05-21    142  |  102  |  25<H>  ----------------------------<  76  3.4<L>   |  34<H>  |  1.31<H>    Ca    8.6      21 May 2021 04:55  Phos  2.8     05-21  Mg     2.4     05-21    TPro  5.7<L>  /  Alb  2.9<L>  /  TBili  1.0  /  DBili  x   /  AST  20  /  ALT  24  /  AlkPhos  70  05-21            RADIOLOGY & ADDITIONAL STUDIES REVIEWED:  ***    GOALS OF CARE DISCUSSION WITH PATIENT/FAMILY/PROXY:    CRITICAL CARE TIME SPENT: 35 minutes INTERVAL HPI/OVERNIGHT EVENTS: Patient is having bowel movements, 100ml overnight. Patient is complaining of back pain.     PRESSORS: [ ] YES [x ] NO  WHICH:    ANTIBIOTICS:                      Antimicrobial:  piperacillin/tazobactam IVPB.. 3.375 Gram(s) IV Intermittent every 8 hours  trimethoprim  40 mG/sulfamethoxazole 200 mG Suspension 160 milliGRAM(s) Oral <User Schedule>    Cardiovascular:  midodrine 10 milliGRAM(s) Oral every 8 hours  phenylephrine    Infusion 1.5 MICROgram(s)/kG/Min IV Continuous <Continuous>    Pulmonary:  ALBUTerol    90 MICROgram(s) HFA Inhaler 2 Puff(s) Inhalation every 6 hours PRN    Hematalogic:  enoxaparin Injectable 40 milliGRAM(s) SubCutaneous every 24 hours    Other:  acetaminophen    Suspension .. 650 milliGRAM(s) Oral every 12 hours PRN  artificial  tears Solution 1 Drop(s) Both EYES every 4 hours PRN  ascorbic acid 1000 milliGRAM(s) Oral daily  BACItracin   Ointment 1 Application(s) Topical two times a day  bisacodyl Suppository 10 milliGRAM(s) Rectal daily  chlorhexidine 0.12% Liquid 15 milliLiter(s) Oral Mucosa every 12 hours  chlorhexidine 2% Cloths 1 Application(s) Topical <User Schedule>  clonazePAM  Tablet 2 milliGRAM(s) Oral every 8 hours  gabapentin 100 milliGRAM(s) Oral every 8 hours  insulin lispro (ADMELOG) corrective regimen sliding scale   SubCutaneous every 6 hours  lactulose Syrup 10 Gram(s) Oral daily  LORazepam   Injectable 2 milliGRAM(s) IV Push every 6 hours PRN  methadone    Tablet 10 milliGRAM(s) Oral <User Schedule>  methadone    Tablet 5 milliGRAM(s) Oral <User Schedule>  metoclopramide Injectable 10 milliGRAM(s) IV Push three times a day  mupirocin 2% Ointment 1 Application(s) Both Nostrils two times a day  pantoprazole   Suspension 40 milliGRAM(s) Oral daily  predniSONE   Tablet 25 milliGRAM(s) Oral daily  QUEtiapine 75 milliGRAM(s) Oral two times a day  sodium chloride 0.9% lock flush 10 milliLiter(s) IV Push every 1 hour PRN    acetaminophen    Suspension .. 650 milliGRAM(s) Oral every 12 hours PRN  ALBUTerol    90 MICROgram(s) HFA Inhaler 2 Puff(s) Inhalation every 6 hours PRN  artificial  tears Solution 1 Drop(s) Both EYES every 4 hours PRN  ascorbic acid 1000 milliGRAM(s) Oral daily  BACItracin   Ointment 1 Application(s) Topical two times a day  bisacodyl Suppository 10 milliGRAM(s) Rectal daily  chlorhexidine 0.12% Liquid 15 milliLiter(s) Oral Mucosa every 12 hours  chlorhexidine 2% Cloths 1 Application(s) Topical <User Schedule>  clonazePAM  Tablet 2 milliGRAM(s) Oral every 8 hours  enoxaparin Injectable 40 milliGRAM(s) SubCutaneous every 24 hours  gabapentin 100 milliGRAM(s) Oral every 8 hours  insulin lispro (ADMELOG) corrective regimen sliding scale   SubCutaneous every 6 hours  lactulose Syrup 10 Gram(s) Oral daily  LORazepam   Injectable 2 milliGRAM(s) IV Push every 6 hours PRN  methadone    Tablet 10 milliGRAM(s) Oral <User Schedule>  methadone    Tablet 5 milliGRAM(s) Oral <User Schedule>  metoclopramide Injectable 10 milliGRAM(s) IV Push three times a day  midodrine 10 milliGRAM(s) Oral every 8 hours  mupirocin 2% Ointment 1 Application(s) Both Nostrils two times a day  pantoprazole   Suspension 40 milliGRAM(s) Oral daily  phenylephrine    Infusion 1.5 MICROgram(s)/kG/Min IV Continuous <Continuous>  piperacillin/tazobactam IVPB.. 3.375 Gram(s) IV Intermittent every 8 hours  predniSONE   Tablet 25 milliGRAM(s) Oral daily  QUEtiapine 75 milliGRAM(s) Oral two times a day  sodium chloride 0.9% lock flush 10 milliLiter(s) IV Push every 1 hour PRN  trimethoprim  40 mG/sulfamethoxazole 200 mG Suspension 160 milliGRAM(s) Oral <User Schedule>    Drug Dosing Weight  Height (cm): 167.6 (2021 23:15)  Weight (kg): 83.9 (2021 23:15)  BMI (kg/m2): 29.9 (2021 23:15)  BSA (m2): 1.93 (2021 23:15)    CENTRAL LINE: [ ] YES [x ] NO  LOCATION:   DATE INSERTED:  REMOVE: [ ] YES [ ] NO  EXPLAIN:    RIVERA: [x ] YES [ ] NO    DATE INSERTED:  REMOVE:  [ ] YES [ ] NO  EXPLAIN:    A-LINE:  [ ] YES [x ] NO  LOCATION:   DATE INSERTED:  REMOVE:  [ ] YES [ ] NO  EXPLAIN:    PMH -reviewed admission note, no change since admission    ICU Vital Signs Last 24 Hrs  T(C): 36.8 (21 May 2021 05:00), Max: 37.4 (20 May 2021 20:30)  T(F): 98.3 (21 May 2021 05:00), Max: 99.3 (20 May 2021 20:30)  HR: 62 (21 May 2021 06:00) (48 - 125)  BP: 104/56 (21 May 2021 06:00) (93/44 - 156/69)  BP(mean): 66 (21 May 2021 06:00) (56 - 105)  RR: 32 (21 May 2021 06:00) (13 - 36)  SpO2: 100% (21 May 2021 06:00) (64% - 100%)      ABG - ( 21 May 2021 04:18 )  pH, Arterial: 7.44  pH, Blood: x     /  pCO2: 51    /  pO2: 132   / HCO3: 35    / Base Excess: 8.8   /  SaO2: 100                   05-20 @ 07:01  -  05-21 @ 07:00  --------------------------------------------------------  IN: 2256.2 mL / OUT: 1100 mL / NET: 1156.2 mL        Mode: AC/ CMV (Assist Control/ Continuous Mandatory Ventilation)  RR (machine): 32  TV (machine): 400  FiO2: 40  PEEP: 5  ITime: 1  MAP: 13  PIP: 31      PHYSICAL EXAM:  GENERAL: Patient seen resting in bed and in no apparent distress   HEAD: Atraumatic, Normocephalic  EYES: PERRL, conjunctiva and sclera clear  NECK: Supple, normal appearance   NERVOUS SYSTEM:  Awake + encephalopathy  Moving all 4 extremities, obeying commands  CHEST/LUN chest tube on L. No chest deformity; Crackles b/l. No, rhonchi, wheezing, anterior chest tube is not leaking  HEART: Tachycardic Regular rhythm; No murmurs, rubs, or gallops  ABDOMEN: PEG in place. Soft, Bowel sounds present + PEG, mildly distended  EXTREMITIES:  left upper extremity edema, 2+, firm ,no tenderness. HAs thrombophlebitis of left UE  SKIN: Intact, No rashes or lesions  Rivera with pinkish urine      LABS:  CBC Full  -  ( 21 May 2021 04:55 )  WBC Count : 8.69 K/uL  RBC Count : 2.34 M/uL  Hemoglobin : 8.0 g/dL  Hematocrit : 24.8 %  Platelet Count - Automated : 175 K/uL  Mean Cell Volume : 106.0 fl  Mean Cell Hemoglobin : 34.2 pg  Mean Cell Hemoglobin Concentration : 32.3 gm/dL  Auto Neutrophil # : x  Auto Lymphocyte # : x  Auto Monocyte # : x  Auto Eosinophil # : x  Auto Basophil # : x  Auto Neutrophil % : x  Auto Lymphocyte % : x  Auto Monocyte % : x  Auto Eosinophil % : x  Auto Basophil % : x    -    142  |  102  |  25<H>  ----------------------------<  76  3.4<L>   |  34<H>  |  1.31<H>    Ca    8.6      21 May 2021 04:55  Phos  2.8     -  Mg     2.4     -    TPro  5.7<L>  /  Alb  2.9<L>  /  TBili  1.0  /  DBili  x   /  AST  20  /  ALT  24  /  AlkPhos  70  -            RADIOLOGY & ADDITIONAL STUDIES REVIEWED:  ***    GOALS OF CARE DISCUSSION WITH PATIENT/FAMILY/PROXY:    CRITICAL CARE TIME SPENT: 35 minutes

## 2021-05-21 NOTE — BH CONSULTATION LIAISON PROGRESS NOTE - NSBHASSESSMENTFT_PSY_ALL_CORE
55M from [facility], single and living alone working in a restaurant, with no reported PHx or MHx, BIB self on 3/14 c/o fever, cough and SOB and found to have COVID-19 PNA, transferred to ICU on 4/9 for AHRF, later developed bacterial PNA now s/p trach and PEG. Psych consulted for med management due to difficulty weaning off sedation. On initial exam, pt is highly somnolent and unable to participate, but hx and current presentation appear highly c/w acute multifactorial delirium i/s/o AHRF, BZD withdrawal and opioid withdrawal. To better manage delirium with less sedation, we agree with planned dose reduction for Klonopin to 1 mg q8h standing with plan to further taper as tolerated. If pt tolerates Klonopin taper well, further changes will likely be recommended in near future, such as DC'ing Seroquel and substituting Thorazine as alternate antipsychotic. Pt disposition will depend on clinical course, but there is currently no reason to believe that pt will require admission to IP psychiatry.

## 2021-05-21 NOTE — PROGRESS NOTE ADULT - SUBJECTIVE AND OBJECTIVE BOX
Patient is a 55y old  Male who presents with a chief complaint of SOB (21 May 2021 07:22)    pt seen in icu [  ], reg med floor [   ], bed [  ], chair at bedside [   ], a+o x3 [  ], lethargic [  ],  nad [  ]    andrea [  ], ngt [  ], peg [  ], et tube [  ], cent line [  ], picc line [  ]        Allergies    No Known Allergies        Vitals    T(F): 98.3 (05-21-21 @ 05:00), Max: 99.3 (05-20-21 @ 20:30)  HR: 108 (05-21-21 @ 07:00) (48 - 125)  BP: 139/68 (05-21-21 @ 07:00) (93/44 - 156/69)  RR: 28 (05-21-21 @ 07:00) (13 - 36)  SpO2: 96% (05-21-21 @ 07:00) (64% - 100%)  Wt(kg): --  CAPILLARY BLOOD GLUCOSE      POCT Blood Glucose.: 86 mg/dL (20 May 2021 23:13)      Labs                          8.0    8.69  )-----------( 175      ( 21 May 2021 04:55 )             24.8       05-21    142  |  102  |  25<H>  ----------------------------<  76  3.4<L>   |  34<H>  |  1.31<H>    Ca    8.6      21 May 2021 04:55  Phos  2.8     05-21  Mg     2.4     05-21    TPro  5.7<L>  /  Alb  2.9<L>  /  TBili  1.0  /  DBili  x   /  AST  20  /  ALT  24  /  AlkPhos  70  05-21            .Sputum Sputum  04-16 @ 04:42   Moderate Enterobacter aerogenes (Carbapenem Resistant)  Normal Respiratory Monserrat present  --  Enterobacter aerogenes (Carbapenem Resistant)      .Urine Clean Catch (Midstream)  03-15 @ 00:52   No growth  --  --          Radiology Results      Meds    MEDICATIONS  (STANDING):  ascorbic acid 1000 milliGRAM(s) Oral daily  BACItracin   Ointment 1 Application(s) Topical two times a day  bisacodyl Suppository 10 milliGRAM(s) Rectal daily  chlorhexidine 0.12% Liquid 15 milliLiter(s) Oral Mucosa every 12 hours  chlorhexidine 2% Cloths 1 Application(s) Topical <User Schedule>  clonazePAM  Tablet 2 milliGRAM(s) Oral every 8 hours  enoxaparin Injectable 40 milliGRAM(s) SubCutaneous every 24 hours  gabapentin 100 milliGRAM(s) Oral every 8 hours  insulin lispro (ADMELOG) corrective regimen sliding scale   SubCutaneous every 6 hours  lactulose Syrup 10 Gram(s) Oral daily  methadone    Tablet 10 milliGRAM(s) Oral <User Schedule>  methadone    Tablet 5 milliGRAM(s) Oral <User Schedule>  metoclopramide Injectable 10 milliGRAM(s) IV Push three times a day  midodrine 10 milliGRAM(s) Oral every 8 hours  mupirocin 2% Ointment 1 Application(s) Both Nostrils two times a day  pantoprazole   Suspension 40 milliGRAM(s) Oral daily  phenylephrine    Infusion 1.5 MICROgram(s)/kG/Min (47.2 mL/Hr) IV Continuous <Continuous>  piperacillin/tazobactam IVPB.. 3.375 Gram(s) IV Intermittent every 8 hours  predniSONE   Tablet 25 milliGRAM(s) Oral daily  QUEtiapine 75 milliGRAM(s) Oral two times a day  trimethoprim  40 mG/sulfamethoxazole 200 mG Suspension 160 milliGRAM(s) Oral <User Schedule>      MEDICATIONS  (PRN):  acetaminophen    Suspension .. 650 milliGRAM(s) Oral every 12 hours PRN Temp greater or equal to 38C (100.4F)  ALBUTerol    90 MICROgram(s) HFA Inhaler 2 Puff(s) Inhalation every 6 hours PRN Shortness of Breath and/or Wheezing  artificial  tears Solution 1 Drop(s) Both EYES every 4 hours PRN Dry Eyes  LORazepam   Injectable 2 milliGRAM(s) IV Push every 6 hours PRN Agitation  sodium chloride 0.9% lock flush 10 milliLiter(s) IV Push every 1 hour PRN Pre/post blood products, medications, blood draw, and to maintain line patency      Physical Exam    Neuro :  no focal deficits  Respiratory: CTA B/L  CV: RRR, S1S2, no murmurs,   Abdominal: Soft, NT, ND +BS,  Extremities: No edema, + peripheral pulses    ASSESSMENT    Hypoxemia    No pertinent past medical history    No significant past surgical history    S/P moody    S/P appendectomy        PLAN     Patient is a 55y old  Male who presents with a chief complaint of SOB (21 May 2021 07:22)    pt seen in icu [ x ], reg med floor [   ], bed [ x ], chair at bedside [   ], awake and responsive [  ], mildly sedated, [ x ],    nad [x  ]    Allergies    No Known Allergies        Vitals    T(F): 98.3 (05-21-21 @ 05:00), Max: 99.3 (05-20-21 @ 20:30)  HR: 108 (05-21-21 @ 07:00) (48 - 125)  BP: 139/68 (05-21-21 @ 07:00) (93/44 - 156/69)  RR: 28 (05-21-21 @ 07:00) (13 - 36)  SpO2: 96% (05-21-21 @ 07:00) (64% - 100%)  Wt(kg): --  CAPILLARY BLOOD GLUCOSE      POCT Blood Glucose.: 86 mg/dL (20 May 2021 23:13)      Labs                          8.0    8.69  )-----------( 175      ( 21 May 2021 04:55 )             24.8       05-21    142  |  102  |  25<H>  ----------------------------<  76  3.4<L>   |  34<H>  |  1.31<H>    Ca    8.6      21 May 2021 04:55  Phos  2.8     05-21  Mg     2.4     05-21    TPro  5.7<L>  /  Alb  2.9<L>  /  TBili  1.0  /  DBili  x   /  AST  20  /  ALT  24  /  AlkPhos  70  05-21            .Sputum Sputum  04-16 @ 04:42   Moderate Enterobacter aerogenes (Carbapenem Resistant)  Normal Respiratory Monserrat present  --  Enterobacter aerogenes (Carbapenem Resistant)      .Urine Clean Catch (Midstream)  03-15 @ 00:52   No growth  --  --          Radiology Results    < from: Xray Chest 1 View- PORTABLE-Routine (Xray Chest 1 View- PORTABLE-Routine .) (05.20.21 @ 15:43) >  IMPRESSION: Improved left pneumothorax. Significant infiltration particularly in the left lower lobe again noted.    < end of copied text >        Meds    MEDICATIONS  (STANDING):  ascorbic acid 1000 milliGRAM(s) Oral daily  BACItracin   Ointment 1 Application(s) Topical two times a day  bisacodyl Suppository 10 milliGRAM(s) Rectal daily  chlorhexidine 0.12% Liquid 15 milliLiter(s) Oral Mucosa every 12 hours  chlorhexidine 2% Cloths 1 Application(s) Topical <User Schedule>  clonazePAM  Tablet 2 milliGRAM(s) Oral every 8 hours  enoxaparin Injectable 40 milliGRAM(s) SubCutaneous every 24 hours  gabapentin 100 milliGRAM(s) Oral every 8 hours  insulin lispro (ADMELOG) corrective regimen sliding scale   SubCutaneous every 6 hours  lactulose Syrup 10 Gram(s) Oral daily  methadone    Tablet 10 milliGRAM(s) Oral <User Schedule>  methadone    Tablet 5 milliGRAM(s) Oral <User Schedule>  metoclopramide Injectable 10 milliGRAM(s) IV Push three times a day  midodrine 10 milliGRAM(s) Oral every 8 hours  mupirocin 2% Ointment 1 Application(s) Both Nostrils two times a day  pantoprazole   Suspension 40 milliGRAM(s) Oral daily  phenylephrine    Infusion 1.5 MICROgram(s)/kG/Min (47.2 mL/Hr) IV Continuous <Continuous>  piperacillin/tazobactam IVPB.. 3.375 Gram(s) IV Intermittent every 8 hours  predniSONE   Tablet 25 milliGRAM(s) Oral daily  QUEtiapine 75 milliGRAM(s) Oral two times a day  trimethoprim  40 mG/sulfamethoxazole 200 mG Suspension 160 milliGRAM(s) Oral <User Schedule>      MEDICATIONS  (PRN):  acetaminophen    Suspension .. 650 milliGRAM(s) Oral every 12 hours PRN Temp greater or equal to 38C (100.4F)  ALBUTerol    90 MICROgram(s) HFA Inhaler 2 Puff(s) Inhalation every 6 hours PRN Shortness of Breath and/or Wheezing  artificial  tears Solution 1 Drop(s) Both EYES every 4 hours PRN Dry Eyes  LORazepam   Injectable 2 milliGRAM(s) IV Push every 6 hours PRN Agitation  sodium chloride 0.9% lock flush 10 milliLiter(s) IV Push every 1 hour PRN Pre/post blood products, medications, blood draw, and to maintain line patency      Physical Exam    Neuro :  no focal deficits  Respiratory: CTA B/L  CV: RRR, S1S2, no murmurs,   Abdominal: Soft, NT, ND +BS, gastrostomy tube inplace  Extremities: edema of extrem, + peripheral pulses      ASSESSMENT      Hypoxemia 2nd to covid pna   transaminitis  prediabetes  h/o appendectomy  cholecystectomy        PLAN    cont cont precautions  covid 5/14/21 neg noted above  d/c remdesevir given covid ab positive noted   completed dexamethasone   started pulse steroids for 3 days - 250mg solumedrol bid now tapered off 4/14/21   C/w prednisone 25 daily    cont on bactrim empirically, TIW   cont asa, vit c,    cont albuterol inhaler   pulm f/u  procalcitonin, D-dimer, crp, ldh, ferritin, lactate noted ,    cont tylenol prn,   cont robitussin prn   pt off precedex    pt on methadone   pain mgmt eval noted   cont phenylephrine drip for hypotension  clonidine held 2nd to low bp  s/p intubation 3/29/21   s/p tracheostomy 4/21/21  O2 sat 96% (64% - 100%)) mv 40%  O2 via mech vent and taper fio2 as tolerated   vent mgmt as per icu  xray 3/19/21 with pneumomediastinum  rept cxr with New trace right apical pneumothorax. New mild left apical pneumothorax. Grossly stable small pneumomediastinum.  Soft tissue emphysema at the neck bases bilaterally. Grossly stable bilateral pulmonary infiltrates noted.   cxr 2/24 with No evidence of pneumothorax can be appreciated on the available image. This may be related to patient positioning. Evidence of pneumomediastinum and subcutaneous emphysema in the lower neck is again   noted. There are patchy bibasilar infiltrates and elevated right hemidiaphragm noted.   cxr 3/29/21 with No significant change bilateral infiltrates. There is a small simple left apical pneumothorax. No significant pleural effusion. Bilateral subcutaneous emphysema similar to prior.   XRs on 7AM and 5PM with no obvious increase of ptx  cxr 4/4/21 with Improving bilateral airspace disease noted    cxr 4/8/21 with Small left pneumothorax noted.  thoracic surg f/u   s/p L chest tube replacement 4/18/21 for recurrence of left pneumothorax   cxr 4/20/21 with Unchanged advanced infiltrates and catheter left chest tube noted   CXR 4/11/21 with : No interval change compared to one day prior. No pneumothorax noted.   unable to flush or aspirate tube fully, noted debris in tubing which was milked out.   Once material removed from tubing able to aspirated and flush fully. Also changed dressing on pigtail which appears to be in good position.   Monitor O2 status   s/p tracheostomy 4/21/21   cxr 4/21/21 with No evidence of active chest disease. Tracheostomy tube in place otherwise no significant change noted   cxr 4/25/21 with A 40% LEFT pneumothorax. LEFT multi-sidehole pigtail catheter overlies LEFT lower hemithorax.. Bilateral multifocal and diffuse ill-defined airspace opacities..  Follow-up AP portable chest radiograph 4/25/2021 AT 8:58 AM: Residual LEFT 30% upper zone pneumothorax. Otherwise no interval change.noted    cxr 4/30/21 Tracheostomy tube again noted. Left chest tube noted with small left apical pneumothorax unchanged. Bilateral airspace opacities overall worsening. Heart size cannot be accurately assessed in this projection noted.   cxr 5/3/21 with Large left pneumothorax noted above.   cxr 5 4/21 with No significant interval change noted above.   ct chest with Extensive chronic appearing consolidative opacities throughout the lungs, most prominent in the left lower lobe and lingula, with bronchiectasis, scarring, and volume loss. Additional scattered peripheral coarse opacities.   Mild left pneumothorax. Left lateral pleural space pigtail catheter, not in continuity with the pneumothorax. Mild bilateral hydronephrosis, similar to appearance on CT of the abdomen and pelvis on April 27. noted above   s/p 2nd chest tube 5/5/21  cxr 5/6/21 with Tracheostomy and catheter left chest tubes remain. , There are significant diffuse advanced infiltrates again noted. No pneumothorax. Above findings are similar to study earlier in the day. Present film shows a left jugular line inserted   with tip entering the superior vena cava noted   cxr 5/11/21 with Heart magnified by technique. Tracheostomy, left jugular line, and 2 catheter left chest tubes remain. Quite advanced infiltration in the lungs particularly in the left lower lobe again noted.  There is a persistent rather small left apical pneumothorax. Chest is similar to May 10 noted    cxt 5/14/21 with Perihilar diffuse airspace disease and LEFT lower lobe retrocardiac airspace consolidation. LEFT chest tube overlies upper zone. Small LEFT apical less than 5% pneumothorax noted   cxr 5/17/21 with Similar infiltrates. Small left pneumothorax similar to the prior study noted.   cxr 5/19/21 with Persistent mild to moderate left pneumothorax despite chest tube noted   cxr 5/20 with Improved left pneumothorax. Significant infiltration particularly in the left lower lobe again noted above.  Rec IR evaluation and placement of pigtail catheter  s/p surgical placement of gastrostomy tube 4/23/2021.   abd xray 5/10/21 with ileus noted   dressing changed daily for seroma  cont tube feeding   CT scan of the chest 5/13/21 demonstrates diffuse bilateral infiltrates with a region of consolidation at the left lung base. Small left pneumothorax. 2 left chest tubes noted in place noted above.  CT scan of the abdomen and pelvis 5/13/21 demonstrates diffuse dilatation of small and large bowel loops most consistent with ileus noted   xray abd with  5/14/21 with Ingested contrast within nonobstructed large bowel to the level of rectum. No acute radiographic intra-abdominal findings noted above.  id f/u   patient completed course of Meropenem  leukocytosis resolved  sputum cx with Enterobacter aerogenes (Carbapenem Resistant) noted above   tmx 99.3  ua positive    andrea changed  Completed  meropenem, s/p 1 dose of Vancomycin   cont zosyn  lispro ss   prognosis poor   s/p 4 units prbc for anemia  f/u h/h    transfuse prbc as needed  heme onc f/u  retic is elevated.    Haptoglobin normal  ? daily phlebotomy  no hemolysis, Fe/B12/folate adequate  hemolysis is unlikely even his baseline haptoglobin may be very elevated due to COVID  direct pamella neg noted    supplement potassium as needed for hypokalemia   psych f/u  Klonopin to 2 mg q8h standing (no PRNs), with plan to further taper as tolerated  DC Ativan PRN due to overlap with Klonopin  Continue delirium precautions: Frequent reorientation, familiar pictures and objects at bedside, natural light in daytime,   consistent sleep/wake schedule, adequate hydration and nutrition, sensory aids (hearing aids, glasses) present if needed, minimizing noise and overstimulation,   clustering care to minimize overnight interruptions, judicious use of deliriogenic medications (anticholinergics, benzodiazepines and opioid analgesics), minimize use of restraints  cont current meds   mgmt as per icu

## 2021-05-21 NOTE — PROGRESS NOTE ADULT - SUBJECTIVE AND OBJECTIVE BOX
Patient is a 55y old  Male who presents with a chief complaint of SOB (21 May 2021 07:59)  Patient remains sedated, on ventilator via trach. S/p reposition of pig tail cath due to enlarging PTX    INTERVAL HPI/OVERNIGHT EVENTS:      VITAL SIGNS:  T(F): 96.8 (05-21-21 @ 08:00)  HR: 109 (05-21-21 @ 09:00)  BP: 138/67 (05-21-21 @ 09:00)  RR: 21 (05-21-21 @ 09:00)  SpO2: 96% (05-21-21 @ 09:00)  Wt(kg): --  I&O's Detail    20 May 2021 07:01  -  21 May 2021 07:00  --------------------------------------------------------  IN:    Enteral Tube Flush: 340 mL    IV PiggyBack: 300 mL    Jevity 1.5: 200 mL    Lactated Ringers Bolus: 1000 mL    Phenylephrine: 416.2 mL  Total IN: 2256.2 mL    OUT:    Chest Tube (mL): 0 mL    Indwelling Catheter - Urethral (mL): 1000 mL    Rectal Tube (mL): 100 mL  Total OUT: 1100 mL    Total NET: 1156.2 mL        Mode: SIMV (Synchronized Intermittent Mandatory Ventilation)  RR (machine): 8  TV (machine): 400  FiO2: 40  PEEP: 5  PS: 5  ITime: 1  MAP: 8  PIP: 24        REVIEW OF SYSTEMS:    CONSTITUTIONAL:  No fevers, chills, sweats    HEENT:  Eyes:  No diplopia or blurred vision. ENT:  No earache, sore throat or runny nose.    CARDIOVASCULAR:  No pressure, squeezing, tightness, or heaviness about the chest; no palpitations.    RESPIRATORY:  Per HPI    GASTROINTESTINAL:  No abdominal pain, nausea, vomiting or diarrhea.    GENITOURINARY:  No dysuria, frequency or urgency.    NEUROLOGIC:  No paresthesias, fasciculations, seizures or weakness.    PSYCHIATRIC:  No disorder of thought or mood.      PHYSICAL EXAM:    Constitutional: Well developed and nourished  Eyes:Perrla  ENMT: normal  Neck:supple  Respiratory: good air entry  Cardiovascular: S1 S2 regular  Gastrointestinal: Soft, Non tender  Extremities: No edema  Vascular:normal  Neurological:sedated  Musculoskeletal:Normal      MEDICATIONS  (STANDING):  ascorbic acid 1000 milliGRAM(s) Oral daily  BACItracin   Ointment 1 Application(s) Topical two times a day  bisacodyl Suppository 10 milliGRAM(s) Rectal daily  chlorhexidine 0.12% Liquid 15 milliLiter(s) Oral Mucosa every 12 hours  chlorhexidine 2% Cloths 1 Application(s) Topical <User Schedule>  clonazePAM  Tablet 2 milliGRAM(s) Oral every 8 hours  enoxaparin Injectable 40 milliGRAM(s) SubCutaneous every 24 hours  gabapentin 100 milliGRAM(s) Oral every 8 hours  insulin lispro (ADMELOG) corrective regimen sliding scale   SubCutaneous every 6 hours  lactulose Syrup 10 Gram(s) Oral daily  methadone    Tablet 10 milliGRAM(s) Oral <User Schedule>  methadone    Tablet 5 milliGRAM(s) Oral <User Schedule>  metoclopramide Injectable 10 milliGRAM(s) IV Push three times a day  midodrine 10 milliGRAM(s) Oral every 8 hours  mupirocin 2% Ointment 1 Application(s) Both Nostrils two times a day  pantoprazole   Suspension 40 milliGRAM(s) Oral daily  phenylephrine    Infusion 1.5 MICROgram(s)/kG/Min (47.2 mL/Hr) IV Continuous <Continuous>  piperacillin/tazobactam IVPB.. 3.375 Gram(s) IV Intermittent every 8 hours  predniSONE   Tablet 25 milliGRAM(s) Oral daily  QUEtiapine 75 milliGRAM(s) Oral two times a day  trimethoprim  40 mG/sulfamethoxazole 200 mG Suspension 160 milliGRAM(s) Oral <User Schedule>    MEDICATIONS  (PRN):  acetaminophen    Suspension .. 650 milliGRAM(s) Oral every 12 hours PRN Temp greater or equal to 38C (100.4F)  ALBUTerol    90 MICROgram(s) HFA Inhaler 2 Puff(s) Inhalation every 6 hours PRN Shortness of Breath and/or Wheezing  artificial  tears Solution 1 Drop(s) Both EYES every 4 hours PRN Dry Eyes  LORazepam   Injectable 2 milliGRAM(s) IV Push every 6 hours PRN Agitation  sodium chloride 0.9% lock flush 10 milliLiter(s) IV Push every 1 hour PRN Pre/post blood products, medications, blood draw, and to maintain line patency      Allergies    No Known Allergies    Intolerances        LABS:                        8.0    8.69  )-----------( 175      ( 21 May 2021 04:55 )             24.8     05-21    142  |  102  |  25<H>  ----------------------------<  76  3.4<L>   |  34<H>  |  1.31<H>    Ca    8.6      21 May 2021 04:55  Phos  2.8     05-21  Mg     2.4     05-21    TPro  5.7<L>  /  Alb  2.9<L>  /  TBili  1.0  /  DBili  x   /  AST  20  /  ALT  24  /  AlkPhos  70  05-21        ABG - ( 21 May 2021 04:18 )  pH, Arterial: 7.44  pH, Blood: x     /  pCO2: 51    /  pO2: 132   / HCO3: 35    / Base Excess: 8.8   /  SaO2: 100                   CAPILLARY BLOOD GLUCOSE      POCT Blood Glucose.: 86 mg/dL (20 May 2021 23:13)  POCT Blood Glucose.: 110 mg/dL (20 May 2021 16:17)  POCT Blood Glucose.: 120 mg/dL (20 May 2021 11:26)        RADIOLOGY & ADDITIONAL TESTS:    CXR:  < from: Xray Chest 1 View- PORTABLE-Routine (Xray Chest 1 View- PORTABLE-Routine .) (05.20.21 @ 15:43) >  IMPRESSION: Improved left pneumothorax. Significant infiltration particularly in the left lower lobe again noted.    < end of copied text >    Ct scan chest:    ekg;    echo:

## 2021-05-21 NOTE — BH CONSULTATION LIAISON PROGRESS NOTE - NSBHCONSULTRECOMMENDOTHER_PSY_A_CORE FT
1. Reduce dose of Klonopin to 1 mg q8h standing (no PRNs), with plan to further taper as tolerated  2. Continue delirium precautions: Frequent reorientation, familiar pictures and objects at bedside, natural light in daytime, consistent sleep/wake schedule, adequate hydration and nutrition, sensory aids (hearing aids, glasses) present if needed, minimizing noise and overstimulation, clustering care to minimize overnight interruptions, judicious use of deliriogenic medications (anticholinergics, benzodiazepines and opioid analgesics), minimize use of restraints  3. Medical management as directed by primary team  4. Psychiatry will continue to follow  5. Case d/w ICU team    Paloma Cali MD  Director, Consultation-Liaison Psychiatry Service  [phone]

## 2021-05-21 NOTE — PROGRESS NOTE ADULT - ASSESSMENT
Assessment and plan: 56 y/o M with no PMH is admitted to ICU for AHRF 2/2 covid19 pna     1. Acute hypoxic respiratory failure  2. ARDS 2/2 Covid pneumonia  3. Pneumothorax  4. Prediabetes  5. Abnormal TSH     Neuro  -Alert and oriented x 3 at baseline   -Off all drips overnight, and calm  -C/w Klonopin to 3mg q8h  -C/w Seroquel 75 mg BID   -Decreased methadone to 10/5/10  -Ativan PRN, fentanyl PRN  -CT head unremarkable   -Low concern for malignant hypothermia, NMS, or serotonin syndrome given waxing/waning and lack of inciting medications    Cardiovascular  # Shock   Was hypotensive again, restarted pheny  Clonidine was d/c     Pulm  #Acute hypoxic respiratory failure: secondary to Covid19 pneumonia  #ARDS  -Was on HFNC then got intubated 3/29  -Trach 4/22 continues on Vent   - Remdesivir was discontinued due to positive antibodies   - Finished Dexamethasone   - Covid19 PCR negative now, off isolation   - Daily SBT, difficulty tolerating 2/2 to agitation  - RR inc to 32 o/n for CO2 retention    # Bacterial Pneumonia  - Stable infiltrate on CXR ,likely developed Bacterial pneumonia   - Completed ABx Zosyn, meropenem, s/p 1 dose of Vancomycin  - MRSA negative from 3/26  - Sputum Cx growing few gram negative rods, CRE - Contact isolation  - Taper prednisone to 30 daily (5/11) for possible organizing pneumonia   - C/w bactrim empirically, TIW for PCP PPX  - Secretions from the trach, needs frequent suctions   - Pulm. Dr. Ryan  - ID Dr Anand   - Was found to have fevers 5/17  Was started on vanc and zosyn  MRSA is negative, will d/c vanc  c/w zosyn. will d/c today    #Pneumothorax and pneumomediastinum:   -Thoracic surgery following  -s/p Chest tube placed 4/8 pigtail to wall suction and d/c on 4/15  -On 4/18 chest tube replaced for recurrence of PTX- c/w chest tube to suction     -anterior chest tube placed 5/6 for worsening pneumothorax with resolution of PTX  5/10 CXR with recurrence of apical pneumo - tube adjusted with improvement however still persisting but stable  -Repeat Chest CT shows persistent PTX  -Water seal inferior CT -PTX was increased, readjusted tube as it was kinked.  - Lateral Ct was removed, repeat CXr showed developing of PTX in left lower lobe, resolving slowly  -Surgery was informed, following the case closely    ID  Likely bacterial pneumonia   -leukocytosis improved 9k  -Febrile overnight  - Was found to have fevers 5/17  Was started on vanc and zosyn  MRSA is negative, will d/c vanc  Will d/c zosyn today        Nephro  -Pitting edema to both arms, ecchymosis on right AC, blisters on left arm around brachial area    -Was retaining urine, andrea was placed 5/5  -DC doxazosin for borderline BPs  -monitor I/Os   -Completed albumin  Metabolic alkalosis stable  Completed diamox   Hold Lasix given Ileus  On Maint IVF  potassium and phosphorus replaced this morning; monitor electrolytes     GI  Transaminitis:   likely secondary to Covid19, Resolved   hepatitis panel -ve  -continue to monitor LFT    Ileus  Abd xray with ileus, CT showing the same 5/15  Restart trickle feeds  G Tube off suction now  Rectal tube in place  C/w Miralax BID standing and reglan   G tube 4/23, - dressing changed daily for seroma  Patient was found to have ileus again on 5/20  Was started on lactulose suppository, NPO for now    Heme  Elevated d-dimer: likely secondary to Covid19   -D-dimer 423 on admission  -Last D-dimer 1434  - No active bleeding, restarted lovenox daily    Anemia  S/p 4 PRBC total  - Now Hg stable 7-9  CTH and CT abdomen w/o contrast no evidence of bleed    No active signs of bleeding (No hematemesis, melena or hematuria)  Hemolysis w/u- negative  Iron studies show ACD  Dr Andrade on board   F/u CBC daily     Endocrine  Prediabetes:  -A1c 5.8  -BS controlled  -continue HSS    Abnormal TSH:  -TSH level noted 0.26,   -Repeat TSH 0.37 and Free T4 1.82    Skin/Catheters  No rashes. Peripheral IV lines.   LIJ  Both arms with edema, right AC with ecchymosis  doppler of LUE was negative    Prophylaxis   On Lovenox for DVT   Protonix for GI proph    GOC  FULL CODE Assessment and plan: 56 y/o M with no PMH is admitted to ICU for AHRF 2/2 covid19 pna     1. Acute hypoxic respiratory failure  2. ARDS 2/2 Covid pneumonia  3. Pneumothorax  4. Prediabetes  5. Abnormal TSH     Neuro  -Alert and oriented x 3 at baseline   -Off all drips overnight, and calm  -C/w Klonopin to 3mg q8h  -C/w Seroquel 75 mg BID   -Decreased methadone to 10/5/10  -Ativan PRN, fentanyl PRN  -CT head unremarkable   -Low concern for malignant hypothermia, NMS, or serotonin syndrome given waxing/waning and lack of inciting medications    Cardiovascular  # Shock   Was hypotensive again, restarted pheny  Clonidine was d/c     Pulm  #Acute hypoxic respiratory failure: secondary to Covid19 pneumonia  #ARDS  -Was on HFNC then got intubated 3/29  -Trach 4/22 continues on Vent   - Remdesivir was discontinued due to positive antibodies   - Finished Dexamethasone   - Covid19 PCR negative now, off isolation   - Daily SBT, difficulty tolerating 2/2 to agitation  - RR inc to 32 o/n for CO2 retention    # Bacterial Pneumonia  - Stable infiltrate on CXR ,likely developed Bacterial pneumonia   - Completed ABx Zosyn, meropenem, s/p 1 dose of Vancomycin  - MRSA negative from 3/26, 5/18  - Sputum Cx growing few gram negative rods, CRE - Contact isolation  - Taper prednisone to 30 daily (5/11) for possible organizing pneumonia   - C/w bactrim empirically, TIW for PCP PPX  - Secretions from the trach, needs frequent suctions   - Pulm. Dr. Ryan  - ID Dr Anand   - Was found to have fevers 5/17  Was started on vanc and zosyn  MRSA is negative, will d/c vanc  c/w zosyn. will decrease dose due to renal impairment    #Pneumothorax and pneumomediastinum:   -Thoracic surgery following  -s/p Chest tube placed 4/8 pigtail to wall suction and d/c on 4/15  -On 4/18 chest tube replaced for recurrence of PTX- c/w chest tube to suction     -anterior chest tube placed 5/6 for worsening pneumothorax with resolution of PTX  5/10 CXR with recurrence of apical pneumo - tube adjusted with improvement however still persisting but stable  -Repeat Chest CT shows persistent PTX  -Water seal inferior CT -PTX was increased, readjusted tube as it was kinked.  - Lateral Ct was removed, repeat CXr showed developing of PTX in left lower lobe, resolving slowly  -Surgery was informed, following the case closely    ID  Likely bacterial pneumonia   -leukocytosis improved 9k  -Febrile overnight  - Was found to have fevers 5/17  Was started on vanc and zosyn  MRSA is negative, will d/c vanc  Will decrease dose of zosyn        Nephro  -Pitting edema to both arms, ecchymosis on right AC, blisters on left arm around brachial area    -Was retaining urine, andrea was placed 5/5  -DC doxazosin for borderline BPs  potassium and phosphorus replaced this morning; monitor electrolytes     JARRED:  Patient has elevated creatinine compared to baseline, creatinine clearance is trending up.  Will start on mild IV hydration  Will send UA , urine lytes  Monitor BMP  Reduce dose of Zosyn    GI  Transaminitis:   likely secondary to Covid19, Resolved   hepatitis panel -ve  -continue to monitor LFT    Ileus  Abd xray with ileus, CT showing the same 5/15  Restart trickle feeds  G Tube off suction now  Rectal tube in place  C/w Miralax BID standing and reglan   G tube 4/23, - dressing changed daily for seroma  Patient was found to have ileus again on 5/20  Was started on lactulose suppository, NPO for now  Will monitor and slowly start on tube feeds    Heme  Elevated d-dimer: likely secondary to Covid19   -D-dimer 423 on admission  -Last D-dimer 1434  - No active bleeding, restarted lovenox daily    Anemia  S/p 4 PRBC total  - Now Hg stable 7-9  CTH and CT abdomen w/o contrast no evidence of bleed    No active signs of bleeding (No hematemesis, melena or hematuria)  Hemolysis w/u- negative  Iron studies show ACD  Dr Andrade on board   F/u CBC daily     Endocrine  Prediabetes:  -A1c 5.8  -BS controlled  -continue HSS    Abnormal TSH:  -TSH level noted 0.26,   -Repeat TSH 0.37 and Free T4 1.82    Skin/Catheters  No rashes. Peripheral IV lines.   LIJ  Both arms with edema, right AC with ecchymosis  doppler of LUE was negative    Prophylaxis   On Lovenox for DVT   Protonix for GI proph    GOC  FULL CODE

## 2021-05-22 LAB
ALBUMIN SERPL ELPH-MCNC: 2.6 G/DL — LOW (ref 3.5–5)
ALP SERPL-CCNC: 71 U/L — SIGNIFICANT CHANGE UP (ref 40–120)
ALT FLD-CCNC: 26 U/L DA — SIGNIFICANT CHANGE UP (ref 10–60)
ANION GAP SERPL CALC-SCNC: 8 MMOL/L — SIGNIFICANT CHANGE UP (ref 5–17)
AST SERPL-CCNC: 21 U/L — SIGNIFICANT CHANGE UP (ref 10–40)
BASE EXCESS BLDA CALC-SCNC: 7.3 MMOL/L — HIGH (ref -2–3)
BASE EXCESS BLDA CALC-SCNC: 7.5 MMOL/L — HIGH (ref -2–3)
BILIRUB SERPL-MCNC: 0.9 MG/DL — SIGNIFICANT CHANGE UP (ref 0.2–1.2)
BLOOD GAS COMMENTS ARTERIAL: SIGNIFICANT CHANGE UP
BLOOD GAS COMMENTS ARTERIAL: SIGNIFICANT CHANGE UP
BUN SERPL-MCNC: 24 MG/DL — HIGH (ref 7–18)
CALCIUM SERPL-MCNC: 8.4 MG/DL — SIGNIFICANT CHANGE UP (ref 8.4–10.5)
CHLORIDE SERPL-SCNC: 104 MMOL/L — SIGNIFICANT CHANGE UP (ref 96–108)
CO2 SERPL-SCNC: 31 MMOL/L — SIGNIFICANT CHANGE UP (ref 22–31)
CREAT SERPL-MCNC: 1.3 MG/DL — SIGNIFICANT CHANGE UP (ref 0.5–1.3)
GLUCOSE BLDC GLUCOMTR-MCNC: 103 MG/DL — HIGH (ref 70–99)
GLUCOSE BLDC GLUCOMTR-MCNC: 109 MG/DL — HIGH (ref 70–99)
GLUCOSE BLDC GLUCOMTR-MCNC: 86 MG/DL — SIGNIFICANT CHANGE UP (ref 70–99)
GLUCOSE SERPL-MCNC: 72 MG/DL — SIGNIFICANT CHANGE UP (ref 70–99)
HCO3 BLDA-SCNC: 33 MMOL/L — HIGH (ref 21–28)
HCO3 BLDA-SCNC: 34 MMOL/L — HIGH (ref 21–28)
HCT VFR BLD CALC: 23.9 % — LOW (ref 39–50)
HGB BLD-MCNC: 7.2 G/DL — LOW (ref 13–17)
HOROWITZ INDEX BLDA+IHG-RTO: 40 — SIGNIFICANT CHANGE UP
HOROWITZ INDEX BLDA+IHG-RTO: 40 — SIGNIFICANT CHANGE UP
MAGNESIUM SERPL-MCNC: 2.3 MG/DL — SIGNIFICANT CHANGE UP (ref 1.6–2.6)
MCHC RBC-ENTMCNC: 30.1 GM/DL — LOW (ref 32–36)
MCHC RBC-ENTMCNC: 30.9 PG — SIGNIFICANT CHANGE UP (ref 27–34)
MCV RBC AUTO: 102.6 FL — HIGH (ref 80–100)
NRBC # BLD: 0 /100 WBCS — SIGNIFICANT CHANGE UP (ref 0–0)
PCO2 BLDA: 50 MMHG — HIGH (ref 35–48)
PCO2 BLDA: 53 MMHG — HIGH (ref 35–48)
PH BLDA: 7.41 — SIGNIFICANT CHANGE UP (ref 7.35–7.45)
PH BLDA: 7.43 — SIGNIFICANT CHANGE UP (ref 7.35–7.45)
PHOSPHATE 24H UR-MCNC: 7.2 MG/DL — SIGNIFICANT CHANGE UP
PHOSPHATE SERPL-MCNC: 3 MG/DL — SIGNIFICANT CHANGE UP (ref 2.5–4.5)
PLATELET # BLD AUTO: 165 K/UL — SIGNIFICANT CHANGE UP (ref 150–400)
PO2 BLDA: 115 MMHG — HIGH (ref 83–108)
PO2 BLDA: 82 MMHG — LOW (ref 83–108)
POTASSIUM SERPL-MCNC: 3.6 MMOL/L — SIGNIFICANT CHANGE UP (ref 3.5–5.3)
POTASSIUM SERPL-SCNC: 3.6 MMOL/L — SIGNIFICANT CHANGE UP (ref 3.5–5.3)
PROT SERPL-MCNC: 5.8 G/DL — LOW (ref 6–8.3)
RBC # BLD: 2.33 M/UL — LOW (ref 4.2–5.8)
RBC # FLD: 17.1 % — HIGH (ref 10.3–14.5)
SAO2 % BLDA: 98 % — SIGNIFICANT CHANGE UP
SAO2 % BLDA: 99 % — SIGNIFICANT CHANGE UP
SODIUM SERPL-SCNC: 143 MMOL/L — SIGNIFICANT CHANGE UP (ref 135–145)
WBC # BLD: 9.45 K/UL — SIGNIFICANT CHANGE UP (ref 3.8–10.5)
WBC # FLD AUTO: 9.45 K/UL — SIGNIFICANT CHANGE UP (ref 3.8–10.5)

## 2021-05-22 PROCEDURE — 74018 RADEX ABDOMEN 1 VIEW: CPT | Mod: 26

## 2021-05-22 PROCEDURE — 71045 X-RAY EXAM CHEST 1 VIEW: CPT | Mod: 26

## 2021-05-22 RX ORDER — NALOXEGOL OXALATE 12.5 MG/1
25 TABLET, FILM COATED ORAL DAILY
Refills: 0 | Status: DISCONTINUED | OUTPATIENT
Start: 2021-05-22 | End: 2021-05-27

## 2021-05-22 RX ORDER — SODIUM CHLORIDE 9 MG/ML
1000 INJECTION INTRAMUSCULAR; INTRAVENOUS; SUBCUTANEOUS
Refills: 0 | Status: DISCONTINUED | OUTPATIENT
Start: 2021-05-22 | End: 2021-05-23

## 2021-05-22 RX ADMIN — NALOXEGOL OXALATE 25 MILLIGRAM(S): 12.5 TABLET, FILM COATED ORAL at 13:28

## 2021-05-22 RX ADMIN — LACTULOSE 10 GRAM(S): 10 SOLUTION ORAL at 13:27

## 2021-05-22 RX ADMIN — Medication 1 MILLIGRAM(S): at 05:37

## 2021-05-22 RX ADMIN — Medication 1 MILLIGRAM(S): at 13:27

## 2021-05-22 RX ADMIN — Medication 10 MILLIGRAM(S): at 05:36

## 2021-05-22 RX ADMIN — GABAPENTIN 100 MILLIGRAM(S): 400 CAPSULE ORAL at 21:00

## 2021-05-22 RX ADMIN — PIPERACILLIN AND TAZOBACTAM 200 GRAM(S): 4; .5 INJECTION, POWDER, LYOPHILIZED, FOR SOLUTION INTRAVENOUS at 05:37

## 2021-05-22 RX ADMIN — ENOXAPARIN SODIUM 40 MILLIGRAM(S): 100 INJECTION SUBCUTANEOUS at 11:56

## 2021-05-22 RX ADMIN — QUETIAPINE FUMARATE 75 MILLIGRAM(S): 200 TABLET, FILM COATED ORAL at 05:36

## 2021-05-22 RX ADMIN — MIDODRINE HYDROCHLORIDE 10 MILLIGRAM(S): 2.5 TABLET ORAL at 13:29

## 2021-05-22 RX ADMIN — Medication 10 MILLIGRAM(S): at 21:01

## 2021-05-22 RX ADMIN — GABAPENTIN 100 MILLIGRAM(S): 400 CAPSULE ORAL at 05:36

## 2021-05-22 RX ADMIN — MIDODRINE HYDROCHLORIDE 10 MILLIGRAM(S): 2.5 TABLET ORAL at 05:37

## 2021-05-22 RX ADMIN — SODIUM CHLORIDE 50 MILLILITER(S): 9 INJECTION INTRAMUSCULAR; INTRAVENOUS; SUBCUTANEOUS at 17:06

## 2021-05-22 RX ADMIN — GABAPENTIN 100 MILLIGRAM(S): 400 CAPSULE ORAL at 13:29

## 2021-05-22 RX ADMIN — Medication 1 APPLICATION(S): at 05:37

## 2021-05-22 RX ADMIN — QUETIAPINE FUMARATE 75 MILLIGRAM(S): 200 TABLET, FILM COATED ORAL at 17:04

## 2021-05-22 RX ADMIN — Medication 1000 MILLIGRAM(S): at 12:32

## 2021-05-22 RX ADMIN — METHADONE HYDROCHLORIDE 10 MILLIGRAM(S): 40 TABLET ORAL at 05:36

## 2021-05-22 RX ADMIN — PANTOPRAZOLE SODIUM 40 MILLIGRAM(S): 20 TABLET, DELAYED RELEASE ORAL at 12:33

## 2021-05-22 RX ADMIN — METHADONE HYDROCHLORIDE 10 MILLIGRAM(S): 40 TABLET ORAL at 20:57

## 2021-05-22 RX ADMIN — Medication 1 MILLIGRAM(S): at 21:01

## 2021-05-22 RX ADMIN — METHADONE HYDROCHLORIDE 5 MILLIGRAM(S): 40 TABLET ORAL at 13:28

## 2021-05-22 RX ADMIN — MUPIROCIN 1 APPLICATION(S): 20 OINTMENT TOPICAL at 05:37

## 2021-05-22 RX ADMIN — Medication 10 MILLIGRAM(S): at 13:29

## 2021-05-22 RX ADMIN — MIDODRINE HYDROCHLORIDE 10 MILLIGRAM(S): 2.5 TABLET ORAL at 21:00

## 2021-05-22 RX ADMIN — Medication 1 APPLICATION(S): at 17:03

## 2021-05-22 RX ADMIN — CHLORHEXIDINE GLUCONATE 1 APPLICATION(S): 213 SOLUTION TOPICAL at 05:22

## 2021-05-22 RX ADMIN — Medication 25 MILLIGRAM(S): at 05:36

## 2021-05-22 NOTE — PROGRESS NOTE ADULT - ASSESSMENT
Assessment and plan: 56 y/o M with no PMH is admitted to ICU for AHRF 2/2 covid19 pna     1. Acute hypoxic respiratory failure  2. ARDS 2/2 Covid pneumonia  3. Pneumothorax  4. Prediabetes  5. Abnormal TSH     Neuro  -Alert and oriented x 3 at baseline   -Off all drips overnight, and calm  -C/w Klonopin to 3mg q8h  -C/w Seroquel 75 mg BID   -Decreased methadone to 10/5/10  -Ativan PRN, fentanyl PRN  -CT head unremarkable   -Low concern for malignant hypothermia, NMS, or serotonin syndrome given waxing/waning and lack of inciting medications    Cardiovascular  # Shock   Was hypotensive again, restarted pheny  Clonidine was d/c     Pulm  #Acute hypoxic respiratory failure: secondary to Covid19 pneumonia  #ARDS  -Was on HFNC then got intubated 3/29  -Trach 4/22 continues on Vent   - Remdesivir was discontinued due to positive antibodies   - Finished Dexamethasone   - Covid19 PCR negative now, off isolation   - Daily SBT, difficulty tolerating 2/2 to agitation  - RR inc to 32 o/n for CO2 retention    # Bacterial Pneumonia  - Stable infiltrate on CXR ,likely developed Bacterial pneumonia   - Completed ABx Zosyn, meropenem, s/p 1 dose of Vancomycin  - MRSA negative from 3/26, 5/18  - Sputum Cx growing few gram negative rods, CRE - Contact isolation  - Taper prednisone to 30 daily (5/11) for possible organizing pneumonia   - C/w bactrim empirically, TIW for PCP PPX  - Secretions from the trach, needs frequent suctions   - Pulm. Dr. Ryan  - ID Dr Anand   - Was found to have fevers 5/17  Was started on vanc and zosyn  MRSA is negative, will d/c vanc  c/w zosyn. will decrease dose due to renal impairment    #Pneumothorax and pneumomediastinum:   -Thoracic surgery following  -s/p Chest tube placed 4/8 pigtail to wall suction and d/c on 4/15  -On 4/18 chest tube replaced for recurrence of PTX- c/w chest tube to suction     -anterior chest tube placed 5/6 for worsening pneumothorax with resolution of PTX  5/10 CXR with recurrence of apical pneumo - tube adjusted with improvement however still persisting but stable  -Repeat Chest CT shows persistent PTX  -Water seal inferior CT -PTX was increased, readjusted tube as it was kinked.  - Lateral Ct was removed, repeat CXr showed developing of PTX in left lower lobe, resolving slowly  -Surgery was informed, following the case closely    ID  Likely bacterial pneumonia   -leukocytosis improved 9k  -Febrile overnight  - Was found to have fevers 5/17  Was started on vanc and zosyn  MRSA is negative, will d/c vanc  Will decrease dose of zosyn        Nephro  -Pitting edema to both arms, ecchymosis on right AC, blisters on left arm around brachial area    -Was retaining urine, andrea was placed 5/5  -DC doxazosin for borderline BPs  potassium and phosphorus replaced this morning; monitor electrolytes     JARRED:  Patient has elevated creatinine compared to baseline, creatinine clearance is trending up.  Will start on mild IV hydration  Will send UA , urine lytes  Monitor BMP  Reduce dose of Zosyn    GI  Transaminitis:   likely secondary to Covid19, Resolved   hepatitis panel -ve  -continue to monitor LFT    Ileus  Abd xray with ileus, CT showing the same 5/15  Restart trickle feeds  G Tube off suction now  Rectal tube in place  C/w Miralax BID standing and reglan   G tube 4/23, - dressing changed daily for seroma  Patient was found to have ileus again on 5/20  Was started on lactulose suppository, NPO for now  Will monitor and slowly start on tube feeds  Cdiff negative    Heme  Elevated d-dimer: likely secondary to Covid19   -D-dimer 423 on admission  -Last D-dimer 1434  - No active bleeding, restarted lovenox daily    Anemia  S/p 4 PRBC total  - Now Hg stable 7-9  CTH and CT abdomen w/o contrast no evidence of bleed    No active signs of bleeding (No hematemesis, melena or hematuria)  Hemolysis w/u- negative  Iron studies show ACD  Dr Andrade on board   F/u CBC daily     Endocrine  Prediabetes:  -A1c 5.8  -BS controlled  -continue HSS    Abnormal TSH:  -TSH level noted 0.26,   -Repeat TSH 0.37 and Free T4 1.82    Skin/Catheters  No rashes. Peripheral IV lines.   LIJ  Both arms with edema, right AC with ecchymosis  doppler of LUE was negative    Prophylaxis   On Lovenox for DVT   Protonix for GI proph    GOC  FULL CODE Assessment and plan: 54 y/o M with no PMH is admitted to ICU for AHRF 2/2 covid19 pna     1. Acute hypoxic respiratory failure  2. ARDS 2/2 Covid pneumonia  3. Pneumothorax  4. Prediabetes  5. Abnormal TSH     Neuro  -Alert and oriented x 3 at baseline   -Off all drips, and calm  -C/w Klonopin to 1mg q8h  -C/w Seroquel 75 mg BID   -Decreased methadone to 10/5/10  - d/c Ativan PRN, fentanyl PRN  -CT head unremarkable   -Low concern for malignant hypothermia, NMS, or serotonin syndrome given waxing/waning and lack of inciting medications    Cardiovascular  # Shock   Was hypotensive again, restarted pheny  Clonidine was d/c     Pulm  #Acute hypoxic respiratory failure: secondary to Covid19 pneumonia  #ARDS  -Was on HFNC then got intubated 3/29  -Trach 4/22 continues on Vent   - Remdesivir was discontinued due to positive antibodies   - Finished Dexamethasone   - Covid19 PCR negative now, off isolation   - Daily SBT, difficulty tolerating 2/2 to agitation  - RR inc to 32 o/n for CO2 retention  - decrease rr to 22, inc tv 450    # Bacterial Pneumonia  - Stable infiltrate on CXR ,likely developed Bacterial pneumonia   - Completed ABx Zosyn, meropenem, s/p 1 dose of Vancomycin  - MRSA negative from 3/26, 5/18  - Sputum Cx growing few gram negative rods, CRE - Contact isolation  - Taper prednisone to 30 daily (5/11) for possible organizing pneumonia   - C/w bactrim empirically, TIW for PCP PPX  - Secretions from the trach, needs frequent suctions   - Pulm. Dr. Ryan  - ID Dr Anand   - Was found to have fevers 5/17  Was started on vanc and zosyn  MRSA is negative, will d/c vanc  c/w zosyn. will decrease dose due to renal impairment    #Pneumothorax and pneumomediastinum:   -Thoracic surgery following  -s/p Chest tube placed 4/8 pigtail to wall suction and d/c on 4/15  -On 4/18 chest tube replaced for recurrence of PTX- c/w chest tube to suction     -anterior chest tube placed 5/6 for worsening pneumothorax with resolution of PTX  5/10 CXR with recurrence of apical pneumo - tube adjusted with improvement however still persisting but stable  -Repeat Chest CT shows persistent PTX  -Water seal inferior CT -PTX was increased, readjusted tube as it was kinked.  - Lateral Ct was removed, repeat CXr showed developing of PTX in left lower lobe, resolving slowly  -Surgery was informed, following the case closely    ID  Likely bacterial pneumonia   -leukocytosis improved 9k  -Febrile 100.4 5/18 - last episode  Was started on vanc and zosyn  MRSA is negative, will d/c vanc  dc zosyn 5/22 s/p 5 days of zosyn    Nephro  -Pitting edema to both arms, ecchymosis on right AC, blisters on left arm around brachial area    -Was retaining urine, andrea was placed 5/5  -DC doxazosin for borderline BPs  potassium and phosphorus replaced this morning; monitor electrolytes     JARRED:  Patient has elevated creatinine compared to baseline, creatinine clearance is trending up.  Will start on mild IV hydration  ua shows proteinuria, triple phosphate crystals, large blood >50 rbc  FENA 6.6%, post obstructive  Monitor BMP    GI  Transaminitis:   likely secondary to Covid19, Resolved   hepatitis panel -ve  -continue to monitor LFT    Ileus  Abd xray with ileus, CT showing the same 5/15  Restart trickle feeds  G Tube off suction now  Rectal tube in place  C/w Miralax BID standing and reglan   G tube 4/23, - dressing changed daily for seroma  Patient was found to have ileus again on 5/20  Was started on lactulose suppository, NPO for now  Will monitor and slowly start on tube feeds  Cdiff negative  Start movantik    Heme  Elevated d-dimer: likely secondary to Covid19   -D-dimer 423 on admission  -Last D-dimer 1434  - No active bleeding, restarted lovenox daily    Anemia  S/p 4 PRBC total  - Now Hg stable 7-9  CTH and CT abdomen w/o contrast no evidence of bleed    No active signs of bleeding (No hematemesis, melena or hematuria)  Hemolysis w/u- negative  Iron studies show ACD  Dr Andrade on board   F/u CBC daily     Endocrine  Prediabetes:  -A1c 5.8  -BS controlled  -continue HSS    Abnormal TSH:  -TSH level noted 0.26,   -Repeat TSH 0.37 and Free T4 1.82    Skin/Catheters  No rashes. Peripheral IV lines.   LIJ  Both arms with edema, right AC with ecchymosis  doppler of LUE was negative    Prophylaxis   On Lovenox for DVT   Protonix for GI proph    GOC  FULL CODE

## 2021-05-22 NOTE — PROGRESS NOTE ADULT - SUBJECTIVE AND OBJECTIVE BOX
Patient is a 55y old  Male who presents with a chief complaint of SOB (22 May 2021 08:00)  Awake, alert, comfortable in bed in NAD. Ventilator dependent via trach.    INTERVAL HPI/OVERNIGHT EVENTS:      VITAL SIGNS:  T(F): 98 (21 @ 08:00)  HR: 86 (21 @ 11:54)  BP: 133/77 (21 @ 10:00)  RR: 26 (21 @ 10:00)  SpO2: 98% (21 @ 11:54)  Wt(kg): --  I&O's Detail    21 May 2021 07:01  -  22 May 2021 07:00  --------------------------------------------------------  IN:    Enteral Tube Flush: 220 mL    sodium chloride 0.9%: 400 mL  Total IN: 620 mL    OUT:    Chest Tube (mL): 50 mL    Indwelling Catheter - Urethral (mL): 1190 mL    Rectal Tube (mL): 60 mL  Total OUT: 1300 mL    Total NET: -680 mL        Mode: AC/ CMV (Assist Control/ Continuous Mandatory Ventilation)  RR (machine): 22  TV (machine): 450  FiO2: 40  PEEP: 5  ITime: 1  MAP: 9  PIP: 21        REVIEW OF SYSTEMS:    CONSTITUTIONAL:  No fevers, chills, sweats    HEENT:  Eyes:  No diplopia or blurred vision. ENT:  No earache, sore throat or runny nose.    CARDIOVASCULAR:  No pressure, squeezing, tightness, or heaviness about the chest; no palpitations.    RESPIRATORY:  Per HPI    GASTROINTESTINAL:  No abdominal pain, nausea, vomiting or diarrhea.    GENITOURINARY:  No dysuria, frequency or urgency.    NEUROLOGIC:  No paresthesias, fasciculations, seizures or weakness.    PSYCHIATRIC:  No disorder of thought or mood.      PHYSICAL EXAM:    Constitutional: Well developed and nourished  Eyes:Perrla  ENMT: normal  Neck:supple  Respiratory: Ronchi bilterally  Cardiovascular: S1 S2 regular  Gastrointestinal: Soft, Non tender  Extremities: + edema  Vascular:normal  Neurological:Awake, alert,Ox3  Musculoskeletal:Normal      MEDICATIONS  (STANDING):  ascorbic acid 1000 milliGRAM(s) Oral daily  BACItracin   Ointment 1 Application(s) Topical two times a day  bisacodyl Suppository 10 milliGRAM(s) Rectal daily  chlorhexidine 2% Cloths 1 Application(s) Topical <User Schedule>  clonazePAM  Tablet 1 milliGRAM(s) Oral every 8 hours  enoxaparin Injectable 40 milliGRAM(s) SubCutaneous every 24 hours  gabapentin 100 milliGRAM(s) Oral every 8 hours  insulin lispro (ADMELOG) corrective regimen sliding scale   SubCutaneous every 6 hours  lactulose Syrup 10 Gram(s) Oral daily  methadone    Tablet 10 milliGRAM(s) Oral <User Schedule>  methadone    Tablet 5 milliGRAM(s) Oral <User Schedule>  metoclopramide Injectable 10 milliGRAM(s) IV Push three times a day  midodrine 10 milliGRAM(s) Oral every 8 hours  naloxegol 25 milliGRAM(s) Oral daily  pantoprazole   Suspension 40 milliGRAM(s) Oral daily  phenylephrine    Infusion 1.5 MICROgram(s)/kG/Min (47.2 mL/Hr) IV Continuous <Continuous>  QUEtiapine 75 milliGRAM(s) Oral two times a day  sodium chloride 0.9%. 1000 milliLiter(s) (50 mL/Hr) IV Continuous <Continuous>  trimethoprim  40 mG/sulfamethoxazole 200 mG Suspension 160 milliGRAM(s) Oral <User Schedule>    MEDICATIONS  (PRN):  acetaminophen    Suspension .. 650 milliGRAM(s) Oral every 12 hours PRN Temp greater or equal to 38C (100.4F)  ALBUTerol    90 MICROgram(s) HFA Inhaler 2 Puff(s) Inhalation every 6 hours PRN Shortness of Breath and/or Wheezing  artificial  tears Solution 1 Drop(s) Both EYES every 4 hours PRN Dry Eyes  sodium chloride 0.9% lock flush 10 milliLiter(s) IV Push every 1 hour PRN Pre/post blood products, medications, blood draw, and to maintain line patency      Allergies    No Known Allergies    Intolerances        LABS:                        7.2    9.45  )-----------( 165      ( 22 May 2021 04:23 )             23.9     05-22    143  |  104  |  24<H>  ----------------------------<  72  3.6   |  31  |  1.30    Ca    8.4      22 May 2021 04:23  Phos  3.0       Mg     2.3         TPro  5.8<L>  /  Alb  2.6<L>  /  TBili  0.9  /  DBili  x   /  AST  21  /  ALT  26  /  AlkPhos  71  -      Urinalysis Basic - ( 21 May 2021 12:08 )    Color: Yellow / Appearance: Hazy / S.010 / pH: x  Gluc: x / Ketone: Negative  / Bili: Negative / Urobili: Negative   Blood: x / Protein: 30 mg/dL / Nitrite: Negative   Leuk Esterase: Negative / RBC: >50 /HPF / WBC 0-2 /HPF   Sq Epi: x / Non Sq Epi: Few /HPF / Bacteria: Trace /HPF      ABG - ( 22 May 2021 12:17 )  pH, Arterial: 7.41  pH, Blood: x     /  pCO2: 53    /  pO2: 115   / HCO3: 34    / Base Excess: 7.3   /  SaO2: 99                    CAPILLARY BLOOD GLUCOSE      POCT Blood Glucose.: 109 mg/dL (22 May 2021 11:11)  POCT Blood Glucose.: 84 mg/dL (21 May 2021 22:27)  POCT Blood Glucose.: 103 mg/dL (21 May 2021 17:53)        RADIOLOGY & ADDITIONAL TESTS:    CXR:  < from: Xray Chest 1 View- PORTABLE-Routine (Xray Chest 1 View- PORTABLE-Routine in AM.) (21 @ 10:03) >  IMPRESSION: No appreciable pneumothorax at this time. Persistent advanced infiltrates.    < end of copied text >    Ct scan chest:    ekg;    echo:

## 2021-05-22 NOTE — PROGRESS NOTE ADULT - SUBJECTIVE AND OBJECTIVE BOX
INTERVAL HPI/OVERNIGHT EVENTS: No acute overnight events.      PRESSORS: [x] YES [] NO     WHICH:pheny    Antimicrobial:  piperacillin/tazobactam IVPB.. 3.375 Gram(s) IV Intermittent every 12 hours  trimethoprim  40 mG/sulfamethoxazole 200 mG Suspension 160 milliGRAM(s) Oral <User Schedule>    Cardiovascular:  midodrine 10 milliGRAM(s) Oral every 8 hours  phenylephrine    Infusion 1.5 MICROgram(s)/kG/Min IV Continuous <Continuous>    Pulmonary:  ALBUTerol    90 MICROgram(s) HFA Inhaler 2 Puff(s) Inhalation every 6 hours PRN    Hematalogic:  enoxaparin Injectable 40 milliGRAM(s) SubCutaneous every 24 hours    Other:  acetaminophen    Suspension .. 650 milliGRAM(s) Oral every 12 hours PRN  artificial  tears Solution 1 Drop(s) Both EYES every 4 hours PRN  ascorbic acid 1000 milliGRAM(s) Oral daily  BACItracin   Ointment 1 Application(s) Topical two times a day  bisacodyl Suppository 10 milliGRAM(s) Rectal daily  chlorhexidine 2% Cloths 1 Application(s) Topical <User Schedule>  clonazePAM  Tablet 1 milliGRAM(s) Oral every 8 hours  gabapentin 100 milliGRAM(s) Oral every 8 hours  insulin lispro (ADMELOG) corrective regimen sliding scale   SubCutaneous every 6 hours  lactulose Syrup 10 Gram(s) Oral daily  LORazepam   Injectable 2 milliGRAM(s) IV Push every 6 hours PRN  methadone    Tablet 10 milliGRAM(s) Oral <User Schedule>  methadone    Tablet 5 milliGRAM(s) Oral <User Schedule>  metoclopramide Injectable 10 milliGRAM(s) IV Push three times a day  mupirocin 2% Ointment 1 Application(s) Both Nostrils two times a day  pantoprazole   Suspension 40 milliGRAM(s) Oral daily  predniSONE   Tablet 25 milliGRAM(s) Oral daily  QUEtiapine 75 milliGRAM(s) Oral two times a day  sodium chloride 0.9% lock flush 10 milliLiter(s) IV Push every 1 hour PRN  sodium chloride 0.9%. 1000 milliLiter(s) IV Continuous <Continuous>    acetaminophen    Suspension .. 650 milliGRAM(s) Oral every 12 hours PRN  ALBUTerol    90 MICROgram(s) HFA Inhaler 2 Puff(s) Inhalation every 6 hours PRN  artificial  tears Solution 1 Drop(s) Both EYES every 4 hours PRN  ascorbic acid 1000 milliGRAM(s) Oral daily  BACItracin   Ointment 1 Application(s) Topical two times a day  bisacodyl Suppository 10 milliGRAM(s) Rectal daily  chlorhexidine 2% Cloths 1 Application(s) Topical <User Schedule>  clonazePAM  Tablet 1 milliGRAM(s) Oral every 8 hours  enoxaparin Injectable 40 milliGRAM(s) SubCutaneous every 24 hours  gabapentin 100 milliGRAM(s) Oral every 8 hours  insulin lispro (ADMELOG) corrective regimen sliding scale   SubCutaneous every 6 hours  lactulose Syrup 10 Gram(s) Oral daily  LORazepam   Injectable 2 milliGRAM(s) IV Push every 6 hours PRN  methadone    Tablet 10 milliGRAM(s) Oral <User Schedule>  methadone    Tablet 5 milliGRAM(s) Oral <User Schedule>  metoclopramide Injectable 10 milliGRAM(s) IV Push three times a day  midodrine 10 milliGRAM(s) Oral every 8 hours  mupirocin 2% Ointment 1 Application(s) Both Nostrils two times a day  pantoprazole   Suspension 40 milliGRAM(s) Oral daily  phenylephrine    Infusion 1.5 MICROgram(s)/kG/Min IV Continuous <Continuous>  piperacillin/tazobactam IVPB.. 3.375 Gram(s) IV Intermittent every 12 hours  predniSONE   Tablet 25 milliGRAM(s) Oral daily  QUEtiapine 75 milliGRAM(s) Oral two times a day  sodium chloride 0.9% lock flush 10 milliLiter(s) IV Push every 1 hour PRN  sodium chloride 0.9%. 1000 milliLiter(s) IV Continuous <Continuous>  trimethoprim  40 mG/sulfamethoxazole 200 mG Suspension 160 milliGRAM(s) Oral <User Schedule>    Drug Dosing Weight  Height (cm): 167.6 (2021 23:15)  Weight (kg): 83.9 (2021 23:15)  BMI (kg/m2): 29.9 (2021 23:15)  BSA (m2): 1.93 (2021 23:15)    CENTRAL LINE: [] YES [x] NO  LOCATION:   DATE INSERTED:  REMOVE: [] YES [] NO  EXPLAIN:    RIVERA: [x] YES [] NO    DATE INSERTED:   REMOVE:  [] YES [] NO  EXPLAIN: Strict IOs    A-LINE:  [] YES [x] NO  LOCATION:   DATE INSERTED:  REMOVE:  [] YES [] NO  EXPLAIN:    PMH -reviewed admission note, no change since admission  PAST MEDICAL & SURGICAL HISTORY:  No pertinent past medical history    S/P moody  1990s    S/P appendectomy  1990s        ICU Vital Signs Last 24 Hrs  T(C): 36.4 (21 May 2021 23:46), Max: 36.8 (21 May 2021 05:00)  T(F): 97.5 (21 May 2021 23:46), Max: 98.3 (21 May 2021 05:00)  HR: 87 (22 May 2021 00:01) (59 - 121)  BP: 111/57 (22 May 2021 00:01) (93/44 - 145/88)  BP(mean): 68 (22 May 2021 00:01) (56 - 95)  ABP: --  ABP(mean): --  RR: 28 (22 May 2021 00:01) (9 - 32)  SpO2: 100% (22 May 2021 00:01) (91% - 100%)      ABG - ( 21 May 2021 15:42 )  pH, Arterial: 7.38  pH, Blood: x     /  pCO2: 59    /  pO2: 83    / HCO3: 35    / Base Excess: 7.7   /  SaO2: 98                    05-20 @ 07:01  -  05- @ 07:00  --------------------------------------------------------  IN: 2256.2 mL / OUT: 1100 mL / NET: 1156.2 mL        Mode: AC/ CMV (Assist Control/ Continuous Mandatory Ventilation)  RR (machine): 32  TV (machine): 400  FiO2: 40  PEEP: 5  ITime: 0.7  MAP: 14  PIP: 33      REVIEW OF SYSTEMS:    Unable to complete - patient trach        PHYSICAL EXAM:    GENERAL: Patient seen resting in bed and in no apparent distress   HEAD: Atraumatic, Normocephalic  EYES: PERRL, conjunctiva and sclera clear  NECK: Supple, normal appearance   NERVOUS SYSTEM:  Awake + encephalopathy  Moving all 4 extremities, obeying commands  CHEST/LUN chest tube on L. No chest deformity; Crackles b/l. No, rhonchi, wheezing, anterior chest tube is not leaking  HEART: Tachycardic Regular rhythm; No murmurs, rubs, or gallops  ABDOMEN: PEG in place. Soft, Bowel sounds present + PEG, mildly distended  EXTREMITIES:  left upper extremity edema, 2+, firm ,no tenderness. HAs thrombophlebitis of left UE  SKIN: Intact, No rashes or lesions  Rivera with pinkish urine    LABS:  CBC Full  -  ( 21 May 2021 04:55 )  WBC Count : 8.69 K/uL  RBC Count : 2.34 M/uL  Hemoglobin : 8.0 g/dL  Hematocrit : 24.8 %  Platelet Count - Automated : 175 K/uL  Mean Cell Volume : 106.0 fl  Mean Cell Hemoglobin : 34.2 pg  Mean Cell Hemoglobin Concentration : 32.3 gm/dL  Auto Neutrophil # : x  Auto Lymphocyte # : x  Auto Monocyte # : x  Auto Eosinophil # : x  Auto Basophil # : x  Auto Neutrophil % : x  Auto Lymphocyte % : x  Auto Monocyte % : x  Auto Eosinophil % : x  Auto Basophil % : x    05-21    142  |  102  |  25<H>  ----------------------------<  76  3.4<L>   |  34<H>  |  1.31<H>    Ca    8.6      21 May 2021 04:55  Phos  2.8     05-21  Mg     2.4     05-21    TPro  5.7<L>  /  Alb  2.9<L>  /  TBili  1.0  /  DBili  x   /  AST  20  /  ALT  24  /  AlkPhos  70  05-21      Urinalysis Basic - ( 21 May 2021 12:08 )    Color: Yellow / Appearance: Hazy / S.010 / pH: x  Gluc: x / Ketone: Negative  / Bili: Negative / Urobili: Negative   Blood: x / Protein: 30 mg/dL / Nitrite: Negative   Leuk Esterase: Negative / RBC: >50 /HPF / WBC 0-2 /HPF   Sq Epi: x / Non Sq Epi: Few /HPF / Bacteria: Trace /HPF          RADIOLOGY & ADDITIONAL STUDIES REVIEWED:  ***      GOALS OF CARE DISCUSSION WITH PATIENT/FAMILY/PROXY:

## 2021-05-22 NOTE — PROGRESS NOTE ADULT - SUBJECTIVE AND OBJECTIVE BOX
Patient is a 55y old  Male who presents with a chief complaint of SOB (22 May 2021 02:52)    pt seen in icu [  ], reg med floor [   ], bed [  ], chair at bedside [   ], a+o x3 [  ], lethargic [  ],  nad [  ]    andrea [  ], ngt [  ], peg [  ], et tube [  ], cent line [  ], picc line [  ]        Allergies    No Known Allergies        Vitals    T(F): 97.9 (05-22-21 @ 05:22), Max: 97.9 (05-22-21 @ 05:22)  HR: 78 (05-22-21 @ 07:00) (70 - 121)  BP: 103/60 (05-22-21 @ 07:00) (95/63 - 145/88)  RR: 32 (05-22-21 @ 07:00) (9 - 32)  SpO2: 96% (05-22-21 @ 07:00) (91% - 100%)  Wt(kg): --  CAPILLARY BLOOD GLUCOSE      POCT Blood Glucose.: 84 mg/dL (21 May 2021 22:27)      Labs                          7.2    9.45  )-----------( 165      ( 22 May 2021 04:23 )             23.9       05-22    143  |  104  |  24<H>  ----------------------------<  72  3.6   |  31  |  1.30    Ca    8.4      22 May 2021 04:23  Phos  3.0     05-22  Mg     2.3     05-22    TPro  5.8<L>  /  Alb  2.6<L>  /  TBili  0.9  /  DBili  x   /  AST  21  /  ALT  26  /  AlkPhos  71  05-22            .Sputum Sputum  04-16 @ 04:42   Moderate Enterobacter aerogenes (Carbapenem Resistant)  Normal Respiratory Monserrat present  --  Enterobacter aerogenes (Carbapenem Resistant)      .Urine Clean Catch (Midstream)  03-15 @ 00:52   No growth  --  --          Radiology Results      Meds    MEDICATIONS  (STANDING):  ascorbic acid 1000 milliGRAM(s) Oral daily  BACItracin   Ointment 1 Application(s) Topical two times a day  bisacodyl Suppository 10 milliGRAM(s) Rectal daily  chlorhexidine 2% Cloths 1 Application(s) Topical <User Schedule>  clonazePAM  Tablet 1 milliGRAM(s) Oral every 8 hours  enoxaparin Injectable 40 milliGRAM(s) SubCutaneous every 24 hours  gabapentin 100 milliGRAM(s) Oral every 8 hours  insulin lispro (ADMELOG) corrective regimen sliding scale   SubCutaneous every 6 hours  lactulose Syrup 10 Gram(s) Oral daily  methadone    Tablet 10 milliGRAM(s) Oral <User Schedule>  methadone    Tablet 5 milliGRAM(s) Oral <User Schedule>  metoclopramide Injectable 10 milliGRAM(s) IV Push three times a day  midodrine 10 milliGRAM(s) Oral every 8 hours  mupirocin 2% Ointment 1 Application(s) Both Nostrils two times a day  pantoprazole   Suspension 40 milliGRAM(s) Oral daily  phenylephrine    Infusion 1.5 MICROgram(s)/kG/Min (47.2 mL/Hr) IV Continuous <Continuous>  piperacillin/tazobactam IVPB.. 3.375 Gram(s) IV Intermittent every 12 hours  QUEtiapine 75 milliGRAM(s) Oral two times a day  sodium chloride 0.9%. 1000 milliLiter(s) (50 mL/Hr) IV Continuous <Continuous>  trimethoprim  40 mG/sulfamethoxazole 200 mG Suspension 160 milliGRAM(s) Oral <User Schedule>      MEDICATIONS  (PRN):  acetaminophen    Suspension .. 650 milliGRAM(s) Oral every 12 hours PRN Temp greater or equal to 38C (100.4F)  ALBUTerol    90 MICROgram(s) HFA Inhaler 2 Puff(s) Inhalation every 6 hours PRN Shortness of Breath and/or Wheezing  artificial  tears Solution 1 Drop(s) Both EYES every 4 hours PRN Dry Eyes  LORazepam   Injectable 2 milliGRAM(s) IV Push every 6 hours PRN Agitation  sodium chloride 0.9% lock flush 10 milliLiter(s) IV Push every 1 hour PRN Pre/post blood products, medications, blood draw, and to maintain line patency      Physical Exam    Neuro :  no focal deficits  Respiratory: CTA B/L  CV: RRR, S1S2, no murmurs,   Abdominal: Soft, NT, ND +BS,  Extremities: No edema, + peripheral pulses    ASSESSMENT    Hypoxemia    No pertinent past medical history    No significant past surgical history    S/P moody    S/P appendectomy        PLAN     Patient is a 55y old  Male who presents with a chief complaint of SOB (22 May 2021 02:52)    pt seen in icu [ x ], reg med floor [   ], bed [ x ], chair at bedside [   ], awake and responsive [  ], mildly sedated, [ x ],    nad [x  ]      Allergies    No Known Allergies        Vitals    T(F): 97.9 (05-22-21 @ 05:22), Max: 97.9 (05-22-21 @ 05:22)  HR: 78 (05-22-21 @ 07:00) (70 - 121)  BP: 103/60 (05-22-21 @ 07:00) (95/63 - 145/88)  RR: 32 (05-22-21 @ 07:00) (9 - 32)  SpO2: 96% (05-22-21 @ 07:00) (91% - 100%)  Wt(kg): --  CAPILLARY BLOOD GLUCOSE      POCT Blood Glucose.: 84 mg/dL (21 May 2021 22:27)      Labs                          7.2    9.45  )-----------( 165      ( 22 May 2021 04:23 )             23.9       05-22    143  |  104  |  24<H>  ----------------------------<  72  3.6   |  31  |  1.30    Ca    8.4      22 May 2021 04:23  Phos  3.0     05-22  Mg     2.3     05-22    TPro  5.8<L>  /  Alb  2.6<L>  /  TBili  0.9  /  DBili  x   /  AST  21  /  ALT  26  /  AlkPhos  71  05-22            .Sputum Sputum  04-16 @ 04:42   Moderate Enterobacter aerogenes (Carbapenem Resistant)  Normal Respiratory Monserrat present  --  Enterobacter aerogenes (Carbapenem Resistant)      .Urine Clean Catch (Midstream)  03-15 @ 00:52   No growth  --  --          Radiology Results      < from: Xray Chest 1 View- PORTABLE-Routine (Xray Chest 1 View- PORTABLE-Routine in AM.) (05.21.21 @ 10:03) >  Heart magnified by technique. Tracheostomy and catheter left chest tube remain.    There are persistent diffuse advanced infiltrates.    Small left apical pneumothorax seen on May 20 is no longer evident.    IMPRESSION: No appreciable pneumothorax at this time. Persistent advanced infiltrates.    < end of copied text >      < from: Xray Abdomen 1 View PORTABLE -Routine (Xray Abdomen 1 View PORTABLE -Routine .) (05.21.21 @ 10:02) >  PEG feeding tube overlies LEFT upper quadrant  Decreased epigastric bowel ileus compared to prior examination.. No free intra-abdominal air.  No obvious intra-abdominal masses.  No abnormal calcifications.  The osseous structures are intact.    IMPRESSION:    Decreased bowel ileus compared to prior 5/20/2021 exam.    < end of copied text >        Meds    MEDICATIONS  (STANDING):  ascorbic acid 1000 milliGRAM(s) Oral daily  BACItracin   Ointment 1 Application(s) Topical two times a day  bisacodyl Suppository 10 milliGRAM(s) Rectal daily  chlorhexidine 2% Cloths 1 Application(s) Topical <User Schedule>  clonazePAM  Tablet 1 milliGRAM(s) Oral every 8 hours  enoxaparin Injectable 40 milliGRAM(s) SubCutaneous every 24 hours  gabapentin 100 milliGRAM(s) Oral every 8 hours  insulin lispro (ADMELOG) corrective regimen sliding scale   SubCutaneous every 6 hours  lactulose Syrup 10 Gram(s) Oral daily  methadone    Tablet 10 milliGRAM(s) Oral <User Schedule>  methadone    Tablet 5 milliGRAM(s) Oral <User Schedule>  metoclopramide Injectable 10 milliGRAM(s) IV Push three times a day  midodrine 10 milliGRAM(s) Oral every 8 hours  mupirocin 2% Ointment 1 Application(s) Both Nostrils two times a day  pantoprazole   Suspension 40 milliGRAM(s) Oral daily  phenylephrine    Infusion 1.5 MICROgram(s)/kG/Min (47.2 mL/Hr) IV Continuous <Continuous>  piperacillin/tazobactam IVPB.. 3.375 Gram(s) IV Intermittent every 12 hours  QUEtiapine 75 milliGRAM(s) Oral two times a day  sodium chloride 0.9%. 1000 milliLiter(s) (50 mL/Hr) IV Continuous <Continuous>  trimethoprim  40 mG/sulfamethoxazole 200 mG Suspension 160 milliGRAM(s) Oral <User Schedule>      MEDICATIONS  (PRN):  acetaminophen    Suspension .. 650 milliGRAM(s) Oral every 12 hours PRN Temp greater or equal to 38C (100.4F)  ALBUTerol    90 MICROgram(s) HFA Inhaler 2 Puff(s) Inhalation every 6 hours PRN Shortness of Breath and/or Wheezing  artificial  tears Solution 1 Drop(s) Both EYES every 4 hours PRN Dry Eyes  LORazepam   Injectable 2 milliGRAM(s) IV Push every 6 hours PRN Agitation  sodium chloride 0.9% lock flush 10 milliLiter(s) IV Push every 1 hour PRN Pre/post blood products, medications, blood draw, and to maintain line patency      Physical Exam      Neuro :  no focal deficits  Respiratory: CTA B/L  CV: RRR, S1S2, no murmurs,   Abdominal: Soft, NT, ND +BS, gastrostomy tube inplace  Extremities: edema of extrem, + peripheral pulses      ASSESSMENT      Hypoxemia 2nd to covid pna   transaminitis  prediabetes  h/o appendectomy  cholecystectomy        PLAN    cont cont precautions  covid 5/14/21 neg noted above  d/c remdesevir given covid ab positive noted   completed dexamethasone   started pulse steroids for 3 days - 250mg solumedrol bid now tapered off 4/14/21   C/w prednisone 25 daily    cont on bactrim empirically, TIW   cont asa, vit c,    cont albuterol inhaler   pulm f/u  procalcitonin, D-dimer, crp, ldh, ferritin, lactate noted ,    cont tylenol prn,   cont robitussin prn   pt off precedex    pt on methadone   pain mgmt eval noted   cont phenylephrine drip for hypotension  clonidine held 2nd to low bp  s/p intubation 3/29/21   s/p tracheostomy 4/21/21  O2 sat 96% (64% - 100%)) mv 40%  O2 via mech vent and taper fio2 as tolerated   vent mgmt as per icu  xray 3/19/21 with pneumomediastinum  rept cxr with New trace right apical pneumothorax. New mild left apical pneumothorax. Grossly stable small pneumomediastinum.  Soft tissue emphysema at the neck bases bilaterally. Grossly stable bilateral pulmonary infiltrates noted.   cxr 2/24 with No evidence of pneumothorax can be appreciated on the available image. This may be related to patient positioning. Evidence of pneumomediastinum and subcutaneous emphysema in the lower neck is again   noted. There are patchy bibasilar infiltrates and elevated right hemidiaphragm noted.   cxr 3/29/21 with No significant change bilateral infiltrates. There is a small simple left apical pneumothorax. No significant pleural effusion. Bilateral subcutaneous emphysema similar to prior.   XRs on 7AM and 5PM with no obvious increase of ptx  cxr 4/4/21 with Improving bilateral airspace disease noted    cxr 4/8/21 with Small left pneumothorax noted.  thoracic surg f/u   s/p L chest tube replacement 4/18/21 for recurrence of left pneumothorax   cxr 4/20/21 with Unchanged advanced infiltrates and catheter left chest tube noted   CXR 4/11/21 with : No interval change compared to one day prior. No pneumothorax noted.   unable to flush or aspirate tube fully, noted debris in tubing which was milked out.   Once material removed from tubing able to aspirated and flush fully. Also changed dressing on pigtail which appears to be in good position.   Monitor O2 status   s/p tracheostomy 4/21/21   cxr 4/21/21 with No evidence of active chest disease. Tracheostomy tube in place otherwise no significant change noted   cxr 4/25/21 with A 40% LEFT pneumothorax. LEFT multi-sidehole pigtail catheter overlies LEFT lower hemithorax.. Bilateral multifocal and diffuse ill-defined airspace opacities..  Follow-up AP portable chest radiograph 4/25/2021 AT 8:58 AM: Residual LEFT 30% upper zone pneumothorax. Otherwise no interval change.noted    cxr 4/30/21 Tracheostomy tube again noted. Left chest tube noted with small left apical pneumothorax unchanged. Bilateral airspace opacities overall worsening. Heart size cannot be accurately assessed in this projection noted.   cxr 5/3/21 with Large left pneumothorax noted above.   cxr 5 4/21 with No significant interval change noted above.   ct chest with Extensive chronic appearing consolidative opacities throughout the lungs, most prominent in the left lower lobe and lingula, with bronchiectasis, scarring, and volume loss. Additional scattered peripheral coarse opacities.   Mild left pneumothorax. Left lateral pleural space pigtail catheter, not in continuity with the pneumothorax. Mild bilateral hydronephrosis, similar to appearance on CT of the abdomen and pelvis on April 27. noted above   s/p 2nd chest tube 5/5/21  cxr 5/6/21 with Tracheostomy and catheter left chest tubes remain. , There are significant diffuse advanced infiltrates again noted. No pneumothorax. Above findings are similar to study earlier in the day. Present film shows a left jugular line inserted   with tip entering the superior vena cava noted   cxr 5/11/21 with Heart magnified by technique. Tracheostomy, left jugular line, and 2 catheter left chest tubes remain. Quite advanced infiltration in the lungs particularly in the left lower lobe again noted.  There is a persistent rather small left apical pneumothorax. Chest is similar to May 10 noted    cxt 5/14/21 with Perihilar diffuse airspace disease and LEFT lower lobe retrocardiac airspace consolidation. LEFT chest tube overlies upper zone. Small LEFT apical less than 5% pneumothorax noted   cxr 5/17/21 with Similar infiltrates. Small left pneumothorax similar to the prior study noted.   cxr 5/19/21 with Persistent mild to moderate left pneumothorax despite chest tube noted   cxr 5/20 with Improved left pneumothorax. Significant infiltration particularly in the left lower lobe again noted   cxr 5/21/21 with No appreciable pneumothorax at this time. Persistent advanced infiltrates noted above.  s/p surgical placement of gastrostomy tube 4/23/2021.   abd xray 5/10/21 with ileus noted   dressing changed daily for seroma   abd xray 5/21/21 with Decreased bowel ileus compared to prior 5/20/2021 exam noted above.  cont tube feeding   CT scan of the chest 5/13/21 demonstrates diffuse bilateral infiltrates with a region of consolidation at the left lung base. Small left pneumothorax. 2 left chest tubes noted in place noted above.  CT scan of the abdomen and pelvis 5/13/21 demonstrates diffuse dilatation of small and large bowel loops most consistent with ileus noted   xray abd with  5/14/21 with Ingested contrast within nonobstructed large bowel to the level of rectum. No acute radiographic intra-abdominal findings noted above.  id f/u   patient completed course of Meropenem  leukocytosis resolved  sputum cx with Enterobacter aerogenes (Carbapenem Resistant) noted above   tmx 99.3  ua positive    andrea changed  Completed  meropenem, s/p 1 dose of Vancomycin   cont zosyn  lispro ss   prognosis poor   s/p 4 units prbc for anemia  f/u h/h    transfuse prbc as needed  heme onc f/u  retic is elevated.    Haptoglobin normal  ? daily phlebotomy  no hemolysis, Fe/B12/folate adequate  hemolysis is unlikely even his baseline haptoglobin may be very elevated due to COVID  direct pamella neg noted    psych f/u  Reduce dose of Klonopin to 1 mg q8h standing (no PRNs), with plan to further taper as tolerated  Continue delirium precautions: Frequent reorientation, familiar pictures and objects at bedside, natural light in daytime,   consistent sleep/wake schedule, adequate hydration and nutrition, sensory aids (hearing aids, glasses) present if needed, minimizing noise and overstimulation,   clustering care to minimize overnight interruptions, judicious use of deliriogenic medications (anticholinergics, benzodiazepines and opioid analgesics), minimize use of restraints  cont current meds   mgmt as per icu     Patient is a 55y old  Male who presents with a chief complaint of SOB (22 May 2021 02:52)    pt seen in icu [ x ], reg med floor [   ], bed [ x ], chair at bedside [   ], awake and responsive [  ], mildly sedated, [ x ],    nad [x  ]      Allergies    No Known Allergies        Vitals    T(F): 97.9 (05-22-21 @ 05:22), Max: 97.9 (05-22-21 @ 05:22)  HR: 78 (05-22-21 @ 07:00) (70 - 121)  BP: 103/60 (05-22-21 @ 07:00) (95/63 - 145/88)  RR: 32 (05-22-21 @ 07:00) (9 - 32)  SpO2: 96% (05-22-21 @ 07:00) (91% - 100%)  Wt(kg): --  CAPILLARY BLOOD GLUCOSE      POCT Blood Glucose.: 84 mg/dL (21 May 2021 22:27)      Labs                          7.2    9.45  )-----------( 165      ( 22 May 2021 04:23 )             23.9       05-22    143  |  104  |  24<H>  ----------------------------<  72  3.6   |  31  |  1.30    Ca    8.4      22 May 2021 04:23  Phos  3.0     05-22  Mg     2.3     05-22    TPro  5.8<L>  /  Alb  2.6<L>  /  TBili  0.9  /  DBili  x   /  AST  21  /  ALT  26  /  AlkPhos  71  05-22            .Sputum Sputum  04-16 @ 04:42   Moderate Enterobacter aerogenes (Carbapenem Resistant)  Normal Respiratory Monserrat present  --  Enterobacter aerogenes (Carbapenem Resistant)      .Urine Clean Catch (Midstream)  03-15 @ 00:52   No growth  --  --          Radiology Results      < from: Xray Chest 1 View- PORTABLE-Routine (Xray Chest 1 View- PORTABLE-Routine in AM.) (05.21.21 @ 10:03) >  Heart magnified by technique. Tracheostomy and catheter left chest tube remain.    There are persistent diffuse advanced infiltrates.    Small left apical pneumothorax seen on May 20 is no longer evident.    IMPRESSION: No appreciable pneumothorax at this time. Persistent advanced infiltrates.    < end of copied text >      < from: Xray Abdomen 1 View PORTABLE -Routine (Xray Abdomen 1 View PORTABLE -Routine .) (05.21.21 @ 10:02) >  PEG feeding tube overlies LEFT upper quadrant  Decreased epigastric bowel ileus compared to prior examination.. No free intra-abdominal air.  No obvious intra-abdominal masses.  No abnormal calcifications.  The osseous structures are intact.    IMPRESSION:    Decreased bowel ileus compared to prior 5/20/2021 exam.    < end of copied text >        Meds    MEDICATIONS  (STANDING):  ascorbic acid 1000 milliGRAM(s) Oral daily  BACItracin   Ointment 1 Application(s) Topical two times a day  bisacodyl Suppository 10 milliGRAM(s) Rectal daily  chlorhexidine 2% Cloths 1 Application(s) Topical <User Schedule>  clonazePAM  Tablet 1 milliGRAM(s) Oral every 8 hours  enoxaparin Injectable 40 milliGRAM(s) SubCutaneous every 24 hours  gabapentin 100 milliGRAM(s) Oral every 8 hours  insulin lispro (ADMELOG) corrective regimen sliding scale   SubCutaneous every 6 hours  lactulose Syrup 10 Gram(s) Oral daily  methadone    Tablet 10 milliGRAM(s) Oral <User Schedule>  methadone    Tablet 5 milliGRAM(s) Oral <User Schedule>  metoclopramide Injectable 10 milliGRAM(s) IV Push three times a day  midodrine 10 milliGRAM(s) Oral every 8 hours  mupirocin 2% Ointment 1 Application(s) Both Nostrils two times a day  pantoprazole   Suspension 40 milliGRAM(s) Oral daily  phenylephrine    Infusion 1.5 MICROgram(s)/kG/Min (47.2 mL/Hr) IV Continuous <Continuous>  piperacillin/tazobactam IVPB.. 3.375 Gram(s) IV Intermittent every 12 hours  QUEtiapine 75 milliGRAM(s) Oral two times a day  sodium chloride 0.9%. 1000 milliLiter(s) (50 mL/Hr) IV Continuous <Continuous>  trimethoprim  40 mG/sulfamethoxazole 200 mG Suspension 160 milliGRAM(s) Oral <User Schedule>      MEDICATIONS  (PRN):  acetaminophen    Suspension .. 650 milliGRAM(s) Oral every 12 hours PRN Temp greater or equal to 38C (100.4F)  ALBUTerol    90 MICROgram(s) HFA Inhaler 2 Puff(s) Inhalation every 6 hours PRN Shortness of Breath and/or Wheezing  artificial  tears Solution 1 Drop(s) Both EYES every 4 hours PRN Dry Eyes  LORazepam   Injectable 2 milliGRAM(s) IV Push every 6 hours PRN Agitation  sodium chloride 0.9% lock flush 10 milliLiter(s) IV Push every 1 hour PRN Pre/post blood products, medications, blood draw, and to maintain line patency      Physical Exam      Neuro :  no focal deficits  Respiratory: CTA B/L  CV: RRR, S1S2, no murmurs,   Abdominal: Soft, NT, ND +BS, gastrostomy tube inplace  Extremities: edema of extrem, + peripheral pulses      ASSESSMENT      Hypoxemia 2nd to covid pna   transaminitis  prediabetes  h/o appendectomy  cholecystectomy        PLAN    cont cont precautions  covid 5/14/21 neg noted above  d/c remdesevir given covid ab positive noted   completed dexamethasone   started pulse steroids for 3 days - 250mg solumedrol bid now tapered off 4/14/21   C/w prednisone 25 daily    cont on bactrim empirically, TIW   cont asa, vit c,    cont albuterol inhaler   pulm f/u  procalcitonin, D-dimer, crp, ldh, ferritin, lactate noted ,    cont tylenol prn,   cont robitussin prn   pt off precedex    pt on methadone   pain mgmt eval noted   cont phenylephrine drip for hypotension  clonidine held 2nd to low bp  s/p intubation 3/29/21   s/p tracheostomy 4/21/21  O2 sat 96% (91% - 100%) mv 40%  O2 via mech vent and taper fio2 as tolerated   vent mgmt as per icu  xray 3/19/21 with pneumomediastinum  rept cxr with New trace right apical pneumothorax. New mild left apical pneumothorax. Grossly stable small pneumomediastinum.  Soft tissue emphysema at the neck bases bilaterally. Grossly stable bilateral pulmonary infiltrates noted.   cxr 2/24 with No evidence of pneumothorax can be appreciated on the available image. This may be related to patient positioning. Evidence of pneumomediastinum and subcutaneous emphysema in the lower neck is again   noted. There are patchy bibasilar infiltrates and elevated right hemidiaphragm noted.   cxr 3/29/21 with No significant change bilateral infiltrates. There is a small simple left apical pneumothorax. No significant pleural effusion. Bilateral subcutaneous emphysema similar to prior.   XRs on 7AM and 5PM with no obvious increase of ptx  cxr 4/4/21 with Improving bilateral airspace disease noted    cxr 4/8/21 with Small left pneumothorax noted.  thoracic surg f/u   s/p L chest tube replacement 4/18/21 for recurrence of left pneumothorax   cxr 4/20/21 with Unchanged advanced infiltrates and catheter left chest tube noted   CXR 4/11/21 with : No interval change compared to one day prior. No pneumothorax noted.   unable to flush or aspirate tube fully, noted debris in tubing which was milked out.   Once material removed from tubing able to aspirated and flush fully. Also changed dressing on pigtail which appears to be in good position.   Monitor O2 status   s/p tracheostomy 4/21/21   cxr 4/21/21 with No evidence of active chest disease. Tracheostomy tube in place otherwise no significant change noted   cxr 4/25/21 with A 40% LEFT pneumothorax. LEFT multi-sidehole pigtail catheter overlies LEFT lower hemithorax.. Bilateral multifocal and diffuse ill-defined airspace opacities..  Follow-up AP portable chest radiograph 4/25/2021 AT 8:58 AM: Residual LEFT 30% upper zone pneumothorax. Otherwise no interval change.noted    cxr 4/30/21 Tracheostomy tube again noted. Left chest tube noted with small left apical pneumothorax unchanged. Bilateral airspace opacities overall worsening. Heart size cannot be accurately assessed in this projection noted.   cxr 5/3/21 with Large left pneumothorax noted above.   cxr 5 4/21 with No significant interval change noted above.   ct chest with Extensive chronic appearing consolidative opacities throughout the lungs, most prominent in the left lower lobe and lingula, with bronchiectasis, scarring, and volume loss. Additional scattered peripheral coarse opacities.   Mild left pneumothorax. Left lateral pleural space pigtail catheter, not in continuity with the pneumothorax. Mild bilateral hydronephrosis, similar to appearance on CT of the abdomen and pelvis on April 27. noted above   s/p 2nd chest tube 5/5/21  cxr 5/6/21 with Tracheostomy and catheter left chest tubes remain. , There are significant diffuse advanced infiltrates again noted. No pneumothorax. Above findings are similar to study earlier in the day. Present film shows a left jugular line inserted   with tip entering the superior vena cava noted   cxr 5/11/21 with Heart magnified by technique. Tracheostomy, left jugular line, and 2 catheter left chest tubes remain. Quite advanced infiltration in the lungs particularly in the left lower lobe again noted.  There is a persistent rather small left apical pneumothorax. Chest is similar to May 10 noted    cxt 5/14/21 with Perihilar diffuse airspace disease and LEFT lower lobe retrocardiac airspace consolidation. LEFT chest tube overlies upper zone. Small LEFT apical less than 5% pneumothorax noted   cxr 5/17/21 with Similar infiltrates. Small left pneumothorax similar to the prior study noted.   cxr 5/19/21 with Persistent mild to moderate left pneumothorax despite chest tube noted   cxr 5/20 with Improved left pneumothorax. Significant infiltration particularly in the left lower lobe again noted   cxr 5/21/21 with No appreciable pneumothorax at this time. Persistent advanced infiltrates noted above.  s/p surgical placement of gastrostomy tube 4/23/2021.   abd xray 5/10/21 with ileus noted   dressing changed daily for seroma   abd xray 5/21/21 with Decreased bowel ileus compared to prior 5/20/2021 exam noted above.  cont tube feeding   CT scan of the chest 5/13/21 demonstrates diffuse bilateral infiltrates with a region of consolidation at the left lung base. Small left pneumothorax. 2 left chest tubes noted in place noted above.  CT scan of the abdomen and pelvis 5/13/21 demonstrates diffuse dilatation of small and large bowel loops most consistent with ileus noted   xray abd with  5/14/21 with Ingested contrast within nonobstructed large bowel to the level of rectum. No acute radiographic intra-abdominal findings noted above.  id f/u   patient completed course of Meropenem  leukocytosis resolved  sputum cx with Enterobacter aerogenes (Carbapenem Resistant) noted above   afebrile   ua positive    andrea changed  Completed  meropenem, s/p 1 dose of Vancomycin   cont zosyn  lispro ss   prognosis poor   s/p 4 units prbc for anemia  f/u h/h    transfuse prbc as needed  heme onc f/u  retic is elevated.    Haptoglobin normal  ? daily phlebotomy  no hemolysis, Fe/B12/folate adequate  hemolysis is unlikely even his baseline haptoglobin may be very elevated due to COVID  direct pamella neg noted    psych f/u  Reduce dose of Klonopin to 1 mg q8h standing (no PRNs), with plan to further taper as tolerated  Continue delirium precautions: Frequent reorientation, familiar pictures and objects at bedside, natural light in daytime,   consistent sleep/wake schedule, adequate hydration and nutrition, sensory aids (hearing aids, glasses) present if needed, minimizing noise and overstimulation,   clustering care to minimize overnight interruptions, judicious use of deliriogenic medications (anticholinergics, benzodiazepines and opioid analgesics), minimize use of restraints  cont current meds   mgmt as per icu

## 2021-05-22 NOTE — PROGRESS NOTE ADULT - ATTENDING COMMENTS
55 yr old  man , non smoker with  moody 1990s presented 3/14 with x9 days worsening cough, subjective fevers, and SOB, with x2-3 days dysuria and central, non-radiating, constant CP. Admitted to medicine unit  for acute hypoxic respiratory failure secondary to pna from covid-19 infection .     Assessment:  1. Acute hypoxic respiratory failure  2. Covid-19 infection   3. Transaminitis  4. Prediabetes  5. Bilateral pneumothorax  6. Septic shock - resolved  7. Anemia  8. bowel obstruction/ileus    Plan   -Better controlled anxiety  -Psych recs appreciated, cont current regimen  -Taper methadone as tolerated  -left apical PTX, improved  -S/p tracheostomy placement 4/21   -S/p G-tube 4/23  -Cont. mechanical ventilation   -Daily weaning trials  -Repeat ABG  -Off vasopressors  -PT evaluation  -On antibiotics again will complete 5 days   -C-diff PCR negative  -tube feeding held due to ileus, suspect due to opioid use  -Add movantik   -dvt/gi prophy  -hemodynamic monitoring   -Prognosis is guarded

## 2021-05-23 LAB
ALBUMIN SERPL ELPH-MCNC: 2.8 G/DL — LOW (ref 3.5–5)
ALP SERPL-CCNC: 75 U/L — SIGNIFICANT CHANGE UP (ref 40–120)
ALT FLD-CCNC: 30 U/L DA — SIGNIFICANT CHANGE UP (ref 10–60)
ANION GAP SERPL CALC-SCNC: 8 MMOL/L — SIGNIFICANT CHANGE UP (ref 5–17)
AST SERPL-CCNC: 22 U/L — SIGNIFICANT CHANGE UP (ref 10–40)
BASE EXCESS BLDA CALC-SCNC: 5.8 MMOL/L — HIGH (ref -2–3)
BILIRUB SERPL-MCNC: 0.9 MG/DL — SIGNIFICANT CHANGE UP (ref 0.2–1.2)
BLOOD GAS COMMENTS ARTERIAL: SIGNIFICANT CHANGE UP
BUN SERPL-MCNC: 22 MG/DL — HIGH (ref 7–18)
CALCIUM SERPL-MCNC: 8.9 MG/DL — SIGNIFICANT CHANGE UP (ref 8.4–10.5)
CHLORIDE SERPL-SCNC: 104 MMOL/L — SIGNIFICANT CHANGE UP (ref 96–108)
CO2 SERPL-SCNC: 31 MMOL/L — SIGNIFICANT CHANGE UP (ref 22–31)
CREAT SERPL-MCNC: 1.16 MG/DL — SIGNIFICANT CHANGE UP (ref 0.5–1.3)
GLUCOSE BLDC GLUCOMTR-MCNC: 163 MG/DL — HIGH (ref 70–99)
GLUCOSE BLDC GLUCOMTR-MCNC: 172 MG/DL — HIGH (ref 70–99)
GLUCOSE BLDC GLUCOMTR-MCNC: 72 MG/DL — SIGNIFICANT CHANGE UP (ref 70–99)
GLUCOSE BLDC GLUCOMTR-MCNC: 74 MG/DL — SIGNIFICANT CHANGE UP (ref 70–99)
GLUCOSE BLDC GLUCOMTR-MCNC: 79 MG/DL — SIGNIFICANT CHANGE UP (ref 70–99)
GLUCOSE BLDC GLUCOMTR-MCNC: 97 MG/DL — SIGNIFICANT CHANGE UP (ref 70–99)
GLUCOSE SERPL-MCNC: 70 MG/DL — SIGNIFICANT CHANGE UP (ref 70–99)
HCO3 BLDA-SCNC: 31 MMOL/L — HIGH (ref 21–28)
HCT VFR BLD CALC: 23.7 % — LOW (ref 39–50)
HGB BLD-MCNC: 7.8 G/DL — LOW (ref 13–17)
HOROWITZ INDEX BLDA+IHG-RTO: 40 — SIGNIFICANT CHANGE UP
MAGNESIUM SERPL-MCNC: 2.1 MG/DL — SIGNIFICANT CHANGE UP (ref 1.6–2.6)
MCHC RBC-ENTMCNC: 32.9 GM/DL — SIGNIFICANT CHANGE UP (ref 32–36)
MCHC RBC-ENTMCNC: 34.5 PG — HIGH (ref 27–34)
MCV RBC AUTO: 104.9 FL — HIGH (ref 80–100)
NRBC # BLD: 0 /100 WBCS — SIGNIFICANT CHANGE UP (ref 0–0)
PCO2 BLDA: 44 MMHG — SIGNIFICANT CHANGE UP (ref 35–48)
PH BLDA: 7.45 — SIGNIFICANT CHANGE UP (ref 7.35–7.45)
PHOSPHATE SERPL-MCNC: 2.8 MG/DL — SIGNIFICANT CHANGE UP (ref 2.5–4.5)
PLATELET # BLD AUTO: 194 K/UL — SIGNIFICANT CHANGE UP (ref 150–400)
PO2 BLDA: 173 MMHG — HIGH (ref 83–108)
POTASSIUM SERPL-MCNC: 3.4 MMOL/L — LOW (ref 3.5–5.3)
POTASSIUM SERPL-SCNC: 3.4 MMOL/L — LOW (ref 3.5–5.3)
PROT SERPL-MCNC: 6 G/DL — SIGNIFICANT CHANGE UP (ref 6–8.3)
RBC # BLD: 2.26 M/UL — LOW (ref 4.2–5.8)
RBC # FLD: 17.4 % — HIGH (ref 10.3–14.5)
SAO2 % BLDA: 100 % — SIGNIFICANT CHANGE UP
SODIUM SERPL-SCNC: 143 MMOL/L — SIGNIFICANT CHANGE UP (ref 135–145)
WBC # BLD: 9.73 K/UL — SIGNIFICANT CHANGE UP (ref 3.8–10.5)
WBC # FLD AUTO: 9.73 K/UL — SIGNIFICANT CHANGE UP (ref 3.8–10.5)

## 2021-05-23 PROCEDURE — 71045 X-RAY EXAM CHEST 1 VIEW: CPT | Mod: 26,76

## 2021-05-23 PROCEDURE — 71045 X-RAY EXAM CHEST 1 VIEW: CPT | Mod: 26,77

## 2021-05-23 PROCEDURE — 74018 RADEX ABDOMEN 1 VIEW: CPT | Mod: 26

## 2021-05-23 RX ORDER — DEXTROSE 50 % IN WATER 50 %
50 SYRINGE (ML) INTRAVENOUS ONCE
Refills: 0 | Status: COMPLETED | OUTPATIENT
Start: 2021-05-23 | End: 2021-05-23

## 2021-05-23 RX ORDER — SODIUM CHLORIDE 9 MG/ML
1000 INJECTION INTRAMUSCULAR; INTRAVENOUS; SUBCUTANEOUS
Refills: 0 | Status: DISCONTINUED | OUTPATIENT
Start: 2021-05-23 | End: 2021-05-24

## 2021-05-23 RX ADMIN — Medication 1 MILLIGRAM(S): at 14:29

## 2021-05-23 RX ADMIN — METHADONE HYDROCHLORIDE 5 MILLIGRAM(S): 40 TABLET ORAL at 14:29

## 2021-05-23 RX ADMIN — Medication 1000 MILLIGRAM(S): at 11:50

## 2021-05-23 RX ADMIN — CHLORHEXIDINE GLUCONATE 1 APPLICATION(S): 213 SOLUTION TOPICAL at 05:26

## 2021-05-23 RX ADMIN — PANTOPRAZOLE SODIUM 40 MILLIGRAM(S): 20 TABLET, DELAYED RELEASE ORAL at 11:51

## 2021-05-23 RX ADMIN — Medication 50 MILLILITER(S): at 17:08

## 2021-05-23 RX ADMIN — QUETIAPINE FUMARATE 75 MILLIGRAM(S): 200 TABLET, FILM COATED ORAL at 05:26

## 2021-05-23 RX ADMIN — Medication 1 MILLIGRAM(S): at 05:26

## 2021-05-23 RX ADMIN — Medication 50 MILLILITER(S): at 22:55

## 2021-05-23 RX ADMIN — Medication 1 MILLIGRAM(S): at 21:13

## 2021-05-23 RX ADMIN — MIDODRINE HYDROCHLORIDE 10 MILLIGRAM(S): 2.5 TABLET ORAL at 21:13

## 2021-05-23 RX ADMIN — Medication 1 APPLICATION(S): at 05:25

## 2021-05-23 RX ADMIN — Medication 10 MILLIGRAM(S): at 21:12

## 2021-05-23 RX ADMIN — GABAPENTIN 100 MILLIGRAM(S): 400 CAPSULE ORAL at 21:13

## 2021-05-23 RX ADMIN — ENOXAPARIN SODIUM 40 MILLIGRAM(S): 100 INJECTION SUBCUTANEOUS at 11:46

## 2021-05-23 RX ADMIN — NALOXEGOL OXALATE 25 MILLIGRAM(S): 12.5 TABLET, FILM COATED ORAL at 11:47

## 2021-05-23 RX ADMIN — LACTULOSE 10 GRAM(S): 10 SOLUTION ORAL at 11:51

## 2021-05-23 RX ADMIN — SODIUM CHLORIDE 50 MILLILITER(S): 9 INJECTION INTRAMUSCULAR; INTRAVENOUS; SUBCUTANEOUS at 09:26

## 2021-05-23 RX ADMIN — Medication 10 MILLIGRAM(S): at 14:29

## 2021-05-23 RX ADMIN — GABAPENTIN 100 MILLIGRAM(S): 400 CAPSULE ORAL at 14:29

## 2021-05-23 RX ADMIN — MIDODRINE HYDROCHLORIDE 10 MILLIGRAM(S): 2.5 TABLET ORAL at 05:25

## 2021-05-23 RX ADMIN — Medication 20 MILLIGRAM(S): at 05:25

## 2021-05-23 RX ADMIN — METHADONE HYDROCHLORIDE 10 MILLIGRAM(S): 40 TABLET ORAL at 20:42

## 2021-05-23 RX ADMIN — MIDODRINE HYDROCHLORIDE 10 MILLIGRAM(S): 2.5 TABLET ORAL at 14:29

## 2021-05-23 RX ADMIN — Medication 1 APPLICATION(S): at 17:08

## 2021-05-23 RX ADMIN — Medication 10 MILLIGRAM(S): at 05:25

## 2021-05-23 RX ADMIN — METHADONE HYDROCHLORIDE 10 MILLIGRAM(S): 40 TABLET ORAL at 05:25

## 2021-05-23 RX ADMIN — QUETIAPINE FUMARATE 75 MILLIGRAM(S): 200 TABLET, FILM COATED ORAL at 17:21

## 2021-05-23 RX ADMIN — GABAPENTIN 100 MILLIGRAM(S): 400 CAPSULE ORAL at 05:25

## 2021-05-23 RX ADMIN — Medication 10 MILLIGRAM(S): at 11:51

## 2021-05-23 NOTE — PROGRESS NOTE ADULT - SUBJECTIVE AND OBJECTIVE BOX
Patient is a 55y old  Male who presents with a chief complaint of SOB (23 May 2021 06:36)  Awake, alert, comfortable in bed in NAD. Responsive to verbal stimuli    INTERVAL HPI/OVERNIGHT EVENTS:      VITAL SIGNS:  T(F): 98.9 (21 @ 08:00)  HR: 100 (21 @ 11:00)  BP: 137/65 (21 @ 11:00)  RR: 24 (21 @ 11:00)  SpO2: 96% (21 @ 11:00)  Wt(kg): --  I&O's Detail    22 May 2021 07:01  -  23 May 2021 07:00  --------------------------------------------------------  IN:    Enteral Tube Flush: 110 mL    sodium chloride 0.9%: 250 mL    sodium chloride 0.9%: 900 mL  Total IN: 1260 mL    OUT:    Chest Tube (mL): 90 mL    Indwelling Catheter - Urethral (mL): 2050 mL    Rectal Tube (mL): 150 mL  Total OUT: 2290 mL    Total NET: -1030 mL      23 May 2021 07:01  -  23 May 2021 12:03  --------------------------------------------------------  IN:    sodium chloride 0.9%: 200 mL  Total IN: 200 mL    OUT:    Indwelling Catheter - Urethral (mL): 75 mL  Total OUT: 75 mL    Total NET: 125 mL        Mode: AC/ CMV (Assist Control/ Continuous Mandatory Ventilation)  RR (machine): 22  TV (machine): 450  FiO2: 40  PEEP: 5  ITime: 1  MAP: 13  PIP: 36        REVIEW OF SYSTEMS:    CONSTITUTIONAL:  No fevers, chills, sweats    HEENT:  Eyes:  No diplopia or blurred vision. ENT:  No earache, sore throat or runny nose.    CARDIOVASCULAR:  No pressure, squeezing, tightness, or heaviness about the chest; no palpitations.    RESPIRATORY:  Per HPI    GASTROINTESTINAL:  No abdominal pain, nausea, vomiting or diarrhea.    GENITOURINARY:  No dysuria, frequency or urgency.    NEUROLOGIC:  No paresthesias, fasciculations, seizures or weakness.    PSYCHIATRIC:  No disorder of thought or mood.      PHYSICAL EXAM:    Constitutional: Well developed and nourished  Eyes:Perrla  ENMT: normal  Neck:supple  Respiratory: good air entry  Cardiovascular: S1 S2 regular  Gastrointestinal: Soft, Non tender  Extremities: + edema  Vascular:normal  Neurological:Awake, alert  Musculoskeletal:Normal      MEDICATIONS  (STANDING):  ascorbic acid 1000 milliGRAM(s) Oral daily  BACItracin   Ointment 1 Application(s) Topical two times a day  bisacodyl Suppository 10 milliGRAM(s) Rectal daily  chlorhexidine 2% Cloths 1 Application(s) Topical <User Schedule>  clonazePAM  Tablet 1 milliGRAM(s) Oral every 8 hours  enoxaparin Injectable 40 milliGRAM(s) SubCutaneous every 24 hours  gabapentin 100 milliGRAM(s) Oral every 8 hours  insulin lispro (ADMELOG) corrective regimen sliding scale   SubCutaneous every 6 hours  lactulose Syrup 10 Gram(s) Oral daily  methadone    Tablet 10 milliGRAM(s) Oral <User Schedule>  methadone    Tablet 5 milliGRAM(s) Oral <User Schedule>  metoclopramide Injectable 10 milliGRAM(s) IV Push three times a day  midodrine 10 milliGRAM(s) Oral every 8 hours  naloxegol 25 milliGRAM(s) Oral daily  pantoprazole   Suspension 40 milliGRAM(s) Oral daily  phenylephrine    Infusion 1.5 MICROgram(s)/kG/Min (47.2 mL/Hr) IV Continuous <Continuous>  predniSONE   Tablet 20 milliGRAM(s) Oral daily  QUEtiapine 75 milliGRAM(s) Oral two times a day  sodium chloride 0.9%. 1000 milliLiter(s) (50 mL/Hr) IV Continuous <Continuous>  trimethoprim  40 mG/sulfamethoxazole 200 mG Suspension 160 milliGRAM(s) Oral <User Schedule>    MEDICATIONS  (PRN):  acetaminophen    Suspension .. 650 milliGRAM(s) Oral every 12 hours PRN Temp greater or equal to 38C (100.4F)  ALBUTerol    90 MICROgram(s) HFA Inhaler 2 Puff(s) Inhalation every 6 hours PRN Shortness of Breath and/or Wheezing  artificial  tears Solution 1 Drop(s) Both EYES every 4 hours PRN Dry Eyes  sodium chloride 0.9% lock flush 10 milliLiter(s) IV Push every 1 hour PRN Pre/post blood products, medications, blood draw, and to maintain line patency      Allergies    No Known Allergies    Intolerances        LABS:                        7.8    9.73  )-----------( 194      ( 23 May 2021 04:31 )             23.7     05-23    143  |  104  |  22<H>  ----------------------------<  70  3.4<L>   |  31  |  1.16    Ca    8.9      23 May 2021 04:31  Phos  2.8       Mg     2.1         TPro  6.0  /  Alb  2.8<L>  /  TBili  0.9  /  DBili  x   /  AST  22  /  ALT  30  /  AlkPhos  75  0523      Urinalysis Basic - ( 21 May 2021 12:08 )    Color: Yellow / Appearance: Hazy / S.010 / pH: x  Gluc: x / Ketone: Negative  / Bili: Negative / Urobili: Negative   Blood: x / Protein: 30 mg/dL / Nitrite: Negative   Leuk Esterase: Negative / RBC: >50 /HPF / WBC 0-2 /HPF   Sq Epi: x / Non Sq Epi: Few /HPF / Bacteria: Trace /HPF      ABG - ( 23 May 2021 04:14 )  pH, Arterial: 7.45  pH, Blood: x     /  pCO2: 44    /  pO2: 173   / HCO3: 31    / Base Excess: 5.8   /  SaO2: 100                   CAPILLARY BLOOD GLUCOSE      POCT Blood Glucose.: 97 mg/dL (23 May 2021 11:35)  POCT Blood Glucose.: 74 mg/dL (23 May 2021 05:24)  POCT Blood Glucose.: 86 mg/dL (22 May 2021 23:13)  POCT Blood Glucose.: 103 mg/dL (22 May 2021 16:44)        RADIOLOGY & ADDITIONAL TESTS:    CXR:  < from: Xray Chest 1 View- PORTABLE-Routine (Xray Chest 1 View- PORTABLE-Routine in AM.) (21 @ 08:57) >  FINDINGS/  IMPRESSION:    Tracheostomy tube and withinthe left pigtail catheter are unchanged.    Stable hazy opacities, left greater than the right. No pneumothorax.      < end of copied text >    Ct scan chest:    ekg;    echo:

## 2021-05-23 NOTE — PROGRESS NOTE ADULT - SUBJECTIVE AND OBJECTIVE BOX
Patient is a 55y old  Male who presents with a chief complaint of SOB (23 May 2021 01:51)    pt seen in icu [ x ], reg med floor [   ], bed [ x ], chair at bedside [   ], awake and responsive [  ], mildly sedated, [ x ],    nad [x  ]      Allergies    No Known Allergies        Vitals    T(F): 99 (05-23-21 @ 04:00), Max: 99 (05-23-21 @ 04:00)  HR: 80 (05-23-21 @ 06:00) (58 - 116)  BP: 101/53 (05-23-21 @ 06:00) (86/63 - 159/74)  RR: 22 (05-23-21 @ 06:00) (12 - 32)  SpO2: 100% (05-23-21 @ 06:00) (94% - 100%)  Wt(kg): --  CAPILLARY BLOOD GLUCOSE      POCT Blood Glucose.: 74 mg/dL (23 May 2021 05:24)      Labs                          7.8    9.73  )-----------( 194      ( 23 May 2021 04:31 )             23.7       05-23    143  |  104  |  22<H>  ----------------------------<  70  3.4<L>   |  31  |  1.16    Ca    8.9      23 May 2021 04:31  Phos  2.8     05-23  Mg     2.1     05-23    TPro  6.0  /  Alb  2.8<L>  /  TBili  0.9  /  DBili  x   /  AST  22  /  ALT  30  /  AlkPhos  75  05-23            .Sputum Sputum  04-16 @ 04:42   Moderate Enterobacter aerogenes (Carbapenem Resistant)  Normal Respiratory Monserrat present  --  Enterobacter aerogenes (Carbapenem Resistant)      .Urine Clean Catch (Midstream)  03-15 @ 00:52   No growth  --  --          Radiology Results      Meds    MEDICATIONS  (STANDING):  ascorbic acid 1000 milliGRAM(s) Oral daily  BACItracin   Ointment 1 Application(s) Topical two times a day  bisacodyl Suppository 10 milliGRAM(s) Rectal daily  chlorhexidine 2% Cloths 1 Application(s) Topical <User Schedule>  clonazePAM  Tablet 1 milliGRAM(s) Oral every 8 hours  enoxaparin Injectable 40 milliGRAM(s) SubCutaneous every 24 hours  gabapentin 100 milliGRAM(s) Oral every 8 hours  insulin lispro (ADMELOG) corrective regimen sliding scale   SubCutaneous every 6 hours  lactulose Syrup 10 Gram(s) Oral daily  methadone    Tablet 10 milliGRAM(s) Oral <User Schedule>  methadone    Tablet 5 milliGRAM(s) Oral <User Schedule>  metoclopramide Injectable 10 milliGRAM(s) IV Push three times a day  midodrine 10 milliGRAM(s) Oral every 8 hours  naloxegol 25 milliGRAM(s) Oral daily  pantoprazole   Suspension 40 milliGRAM(s) Oral daily  phenylephrine    Infusion 1.5 MICROgram(s)/kG/Min (47.2 mL/Hr) IV Continuous <Continuous>  predniSONE   Tablet 20 milliGRAM(s) Oral daily  QUEtiapine 75 milliGRAM(s) Oral two times a day  sodium chloride 0.9%. 1000 milliLiter(s) (50 mL/Hr) IV Continuous <Continuous>  trimethoprim  40 mG/sulfamethoxazole 200 mG Suspension 160 milliGRAM(s) Oral <User Schedule>      MEDICATIONS  (PRN):  acetaminophen    Suspension .. 650 milliGRAM(s) Oral every 12 hours PRN Temp greater or equal to 38C (100.4F)  ALBUTerol    90 MICROgram(s) HFA Inhaler 2 Puff(s) Inhalation every 6 hours PRN Shortness of Breath and/or Wheezing  artificial  tears Solution 1 Drop(s) Both EYES every 4 hours PRN Dry Eyes  sodium chloride 0.9% lock flush 10 milliLiter(s) IV Push every 1 hour PRN Pre/post blood products, medications, blood draw, and to maintain line patency      Physical Exam    Neuro :  no focal deficits  Respiratory: CTA B/L  CV: RRR, S1S2, no murmurs,   Abdominal: Soft, NT, ND +BS, gastrostomy tube inplace  Extremities: edema of extrem, + peripheral pulses      ASSESSMENT      Hypoxemia 2nd to covid pna   transaminitis  prediabetes  h/o appendectomy  cholecystectomy        PLAN    cont cont precautions  covid 5/14/21 neg noted above  d/c remdesevir given covid ab positive noted   completed dexamethasone   started pulse steroids for 3 days - 250mg solumedrol bid now tapered off 4/14/21   C/w prednisone 25 daily    cont on bactrim empirically, TIW   cont asa, vit c,    cont albuterol inhaler   pulm f/u  procalcitonin, D-dimer, crp, ldh, ferritin, lactate noted ,    cont tylenol prn,   cont robitussin prn   pt off precedex    pt on methadone   pain mgmt eval noted   cont phenylephrine drip for hypotension  clonidine held 2nd to low bp  s/p intubation 3/29/21   s/p tracheostomy 4/21/21  O2 sat 96% (91% - 100%) mv 40%  O2 via mech vent and taper fio2 as tolerated   vent mgmt as per icu  xray 3/19/21 with pneumomediastinum  rept cxr with New trace right apical pneumothorax. New mild left apical pneumothorax. Grossly stable small pneumomediastinum.  Soft tissue emphysema at the neck bases bilaterally. Grossly stable bilateral pulmonary infiltrates noted.   cxr 2/24 with No evidence of pneumothorax can be appreciated on the available image. This may be related to patient positioning. Evidence of pneumomediastinum and subcutaneous emphysema in the lower neck is again   noted. There are patchy bibasilar infiltrates and elevated right hemidiaphragm noted.   cxr 3/29/21 with No significant change bilateral infiltrates. There is a small simple left apical pneumothorax. No significant pleural effusion. Bilateral subcutaneous emphysema similar to prior.   XRs on 7AM and 5PM with no obvious increase of ptx  cxr 4/4/21 with Improving bilateral airspace disease noted    cxr 4/8/21 with Small left pneumothorax noted.  thoracic surg f/u   s/p L chest tube replacement 4/18/21 for recurrence of left pneumothorax   cxr 4/20/21 with Unchanged advanced infiltrates and catheter left chest tube noted   CXR 4/11/21 with : No interval change compared to one day prior. No pneumothorax noted.   unable to flush or aspirate tube fully, noted debris in tubing which was milked out.   Once material removed from tubing able to aspirated and flush fully. Also changed dressing on pigtail which appears to be in good position.   Monitor O2 status   s/p tracheostomy 4/21/21   cxr 4/21/21 with No evidence of active chest disease. Tracheostomy tube in place otherwise no significant change noted   cxr 4/25/21 with A 40% LEFT pneumothorax. LEFT multi-sidehole pigtail catheter overlies LEFT lower hemithorax.. Bilateral multifocal and diffuse ill-defined airspace opacities..  Follow-up AP portable chest radiograph 4/25/2021 AT 8:58 AM: Residual LEFT 30% upper zone pneumothorax. Otherwise no interval change.noted    cxr 4/30/21 Tracheostomy tube again noted. Left chest tube noted with small left apical pneumothorax unchanged. Bilateral airspace opacities overall worsening. Heart size cannot be accurately assessed in this projection noted.   cxr 5/3/21 with Large left pneumothorax noted above.   cxr 5 4/21 with No significant interval change noted above.   ct chest with Extensive chronic appearing consolidative opacities throughout the lungs, most prominent in the left lower lobe and lingula, with bronchiectasis, scarring, and volume loss. Additional scattered peripheral coarse opacities.   Mild left pneumothorax. Left lateral pleural space pigtail catheter, not in continuity with the pneumothorax. Mild bilateral hydronephrosis, similar to appearance on CT of the abdomen and pelvis on April 27. noted above   s/p 2nd chest tube 5/5/21  cxr 5/6/21 with Tracheostomy and catheter left chest tubes remain. , There are significant diffuse advanced infiltrates again noted. No pneumothorax. Above findings are similar to study earlier in the day. Present film shows a left jugular line inserted   with tip entering the superior vena cava noted   cxr 5/11/21 with Heart magnified by technique. Tracheostomy, left jugular line, and 2 catheter left chest tubes remain. Quite advanced infiltration in the lungs particularly in the left lower lobe again noted.  There is a persistent rather small left apical pneumothorax. Chest is similar to May 10 noted    cxt 5/14/21 with Perihilar diffuse airspace disease and LEFT lower lobe retrocardiac airspace consolidation. LEFT chest tube overlies upper zone. Small LEFT apical less than 5% pneumothorax noted   cxr 5/17/21 with Similar infiltrates. Small left pneumothorax similar to the prior study noted.   cxr 5/19/21 with Persistent mild to moderate left pneumothorax despite chest tube noted   cxr 5/20 with Improved left pneumothorax. Significant infiltration particularly in the left lower lobe again noted   cxr 5/21/21 with No appreciable pneumothorax at this time. Persistent advanced infiltrates noted above.  s/p surgical placement of gastrostomy tube 4/23/2021.   abd xray 5/10/21 with ileus noted   dressing changed daily for seroma   abd xray 5/21/21 with Decreased bowel ileus compared to prior 5/20/2021 exam noted above.  cont tube feeding   CT scan of the chest 5/13/21 demonstrates diffuse bilateral infiltrates with a region of consolidation at the left lung base. Small left pneumothorax. 2 left chest tubes noted in place noted above.  CT scan of the abdomen and pelvis 5/13/21 demonstrates diffuse dilatation of small and large bowel loops most consistent with ileus noted   xray abd with  5/14/21 with Ingested contrast within nonobstructed large bowel to the level of rectum. No acute radiographic intra-abdominal findings noted above.  id f/u   patient completed course of Meropenem  leukocytosis resolved  sputum cx with Enterobacter aerogenes (Carbapenem Resistant) noted above   afebrile   ua positive    andrea changed  Completed  meropenem, s/p 1 dose of Vancomycin   cont zosyn  lispro ss   prognosis poor   s/p 4 units prbc for anemia  f/u h/h    transfuse prbc as needed  heme onc f/u  retic is elevated.    Haptoglobin normal  ? daily phlebotomy  no hemolysis, Fe/B12/folate adequate  hemolysis is unlikely even his baseline haptoglobin may be very elevated due to COVID  direct pamella neg noted    psych f/u  Reduce dose of Klonopin to 1 mg q8h standing (no PRNs), with plan to further taper as tolerated  Continue delirium precautions: Frequent reorientation, familiar pictures and objects at bedside, natural light in daytime,   consistent sleep/wake schedule, adequate hydration and nutrition, sensory aids (hearing aids, glasses) present if needed, minimizing noise and overstimulation,   clustering care to minimize overnight interruptions, judicious use of deliriogenic medications (anticholinergics, benzodiazepines and opioid analgesics), minimize use of restraints  cont current meds   mgmt as per icu         Patient is a 55y old  Male who presents with a chief complaint of SOB (23 May 2021 01:51)    pt seen in icu [ x ], reg med floor [   ], bed [ x ], chair at bedside [   ], awake and responsive [  ], mildly sedated, [ x ],    nad [x  ]      Allergies    No Known Allergies        Vitals    T(F): 99 (05-23-21 @ 04:00), Max: 99 (05-23-21 @ 04:00)  HR: 80 (05-23-21 @ 06:00) (58 - 116)  BP: 101/53 (05-23-21 @ 06:00) (86/63 - 159/74)  RR: 22 (05-23-21 @ 06:00) (12 - 32)  SpO2: 100% (05-23-21 @ 06:00) (94% - 100%)  Wt(kg): --  CAPILLARY BLOOD GLUCOSE      POCT Blood Glucose.: 74 mg/dL (23 May 2021 05:24)      Labs                          7.8    9.73  )-----------( 194      ( 23 May 2021 04:31 )             23.7       05-23    143  |  104  |  22<H>  ----------------------------<  70  3.4<L>   |  31  |  1.16    Ca    8.9      23 May 2021 04:31  Phos  2.8     05-23  Mg     2.1     05-23    TPro  6.0  /  Alb  2.8<L>  /  TBili  0.9  /  DBili  x   /  AST  22  /  ALT  30  /  AlkPhos  75  05-23            .Sputum Sputum  04-16 @ 04:42   Moderate Enterobacter aerogenes (Carbapenem Resistant)  Normal Respiratory Monserrat present  --  Enterobacter aerogenes (Carbapenem Resistant)      .Urine Clean Catch (Midstream)  03-15 @ 00:52   No growth  --  --          Radiology Results    < from: Xray Chest 1 View- PORTABLE-Routine (Xray Chest 1 View- PORTABLE-Routine in AM.) (05.22.21 @ 10:11) >  Impression: Left chest pigtail. Status post tracheostomy. Heart size unremarkable. No pneumothorax. Multiple patchy opacities throughout the left lung.    Status post gastrostomy. No dilated loops of bowel. Unable to evaluate for free air on this projection.    < end of copied text >      Meds    MEDICATIONS  (STANDING):  ascorbic acid 1000 milliGRAM(s) Oral daily  BACItracin   Ointment 1 Application(s) Topical two times a day  bisacodyl Suppository 10 milliGRAM(s) Rectal daily  chlorhexidine 2% Cloths 1 Application(s) Topical <User Schedule>  clonazePAM  Tablet 1 milliGRAM(s) Oral every 8 hours  enoxaparin Injectable 40 milliGRAM(s) SubCutaneous every 24 hours  gabapentin 100 milliGRAM(s) Oral every 8 hours  insulin lispro (ADMELOG) corrective regimen sliding scale   SubCutaneous every 6 hours  lactulose Syrup 10 Gram(s) Oral daily  methadone    Tablet 10 milliGRAM(s) Oral <User Schedule>  methadone    Tablet 5 milliGRAM(s) Oral <User Schedule>  metoclopramide Injectable 10 milliGRAM(s) IV Push three times a day  midodrine 10 milliGRAM(s) Oral every 8 hours  naloxegol 25 milliGRAM(s) Oral daily  pantoprazole   Suspension 40 milliGRAM(s) Oral daily  phenylephrine    Infusion 1.5 MICROgram(s)/kG/Min (47.2 mL/Hr) IV Continuous <Continuous>  predniSONE   Tablet 20 milliGRAM(s) Oral daily  QUEtiapine 75 milliGRAM(s) Oral two times a day  sodium chloride 0.9%. 1000 milliLiter(s) (50 mL/Hr) IV Continuous <Continuous>  trimethoprim  40 mG/sulfamethoxazole 200 mG Suspension 160 milliGRAM(s) Oral <User Schedule>      MEDICATIONS  (PRN):  acetaminophen    Suspension .. 650 milliGRAM(s) Oral every 12 hours PRN Temp greater or equal to 38C (100.4F)  ALBUTerol    90 MICROgram(s) HFA Inhaler 2 Puff(s) Inhalation every 6 hours PRN Shortness of Breath and/or Wheezing  artificial  tears Solution 1 Drop(s) Both EYES every 4 hours PRN Dry Eyes  sodium chloride 0.9% lock flush 10 milliLiter(s) IV Push every 1 hour PRN Pre/post blood products, medications, blood draw, and to maintain line patency      Physical Exam    Neuro :  no focal deficits  Respiratory: CTA B/L  CV: RRR, S1S2, no murmurs,   Abdominal: Soft, NT, ND +BS, gastrostomy tube inplace  Extremities: edema of extrem, + peripheral pulses      ASSESSMENT      Hypoxemia 2nd to covid pna   transaminitis  prediabetes  h/o appendectomy  cholecystectomy        PLAN    cont cont precautions  covid 5/14/21 neg noted above  d/c remdesevir given covid ab positive noted   completed dexamethasone   started pulse steroids for 3 days - 250mg solumedrol bid now tapered off 4/14/21   C/w prednisone 20 daily    cont on bactrim empirically, TIW   cont asa, vit c,    cont albuterol inhaler   pulm f/u  procalcitonin, D-dimer, crp, ldh, ferritin, lactate noted ,    cont tylenol prn,   cont robitussin prn   pt off precedex    pt on methadone   pain mgmt eval noted   cont phenylephrine drip for hypotension  clonidine held 2nd to low bp  s/p intubation 3/29/21   s/p tracheostomy 4/21/21  O2 sat 100% (94% - 100%) mv 40%  O2 via mech vent and taper fio2 as tolerated   vent mgmt as per icu  xray 3/19/21 with pneumomediastinum  rept cxr with New trace right apical pneumothorax. New mild left apical pneumothorax. Grossly stable small pneumomediastinum.  Soft tissue emphysema at the neck bases bilaterally. Grossly stable bilateral pulmonary infiltrates noted.   cxr 2/24 with No evidence of pneumothorax can be appreciated on the available image. This may be related to patient positioning. Evidence of pneumomediastinum and subcutaneous emphysema in the lower neck is again   noted. There are patchy bibasilar infiltrates and elevated right hemidiaphragm noted.   cxr 3/29/21 with No significant change bilateral infiltrates. There is a small simple left apical pneumothorax. No significant pleural effusion. Bilateral subcutaneous emphysema similar to prior.   XRs on 7AM and 5PM with no obvious increase of ptx  cxr 4/4/21 with Improving bilateral airspace disease noted    cxr 4/8/21 with Small left pneumothorax noted.  thoracic surg f/u   s/p L chest tube replacement 4/18/21 for recurrence of left pneumothorax   cxr 4/20/21 with Unchanged advanced infiltrates and catheter left chest tube noted   CXR 4/11/21 with : No interval change compared to one day prior. No pneumothorax noted.   unable to flush or aspirate tube fully, noted debris in tubing which was milked out.   Once material removed from tubing able to aspirated and flush fully. Also changed dressing on pigtail which appears to be in good position.   Monitor O2 status   s/p tracheostomy 4/21/21   cxr 4/21/21 with No evidence of active chest disease. Tracheostomy tube in place otherwise no significant change noted   cxr 4/25/21 with A 40% LEFT pneumothorax. LEFT multi-sidehole pigtail catheter overlies LEFT lower hemithorax.. Bilateral multifocal and diffuse ill-defined airspace opacities..  Follow-up AP portable chest radiograph 4/25/2021 AT 8:58 AM: Residual LEFT 30% upper zone pneumothorax. Otherwise no interval change.noted    cxr 4/30/21 Tracheostomy tube again noted. Left chest tube noted with small left apical pneumothorax unchanged. Bilateral airspace opacities overall worsening. Heart size cannot be accurately assessed in this projection noted.   cxr 5/3/21 with Large left pneumothorax noted above.   cxr 5 4/21 with No significant interval change noted above.   ct chest with Extensive chronic appearing consolidative opacities throughout the lungs, most prominent in the left lower lobe and lingula, with bronchiectasis, scarring, and volume loss. Additional scattered peripheral coarse opacities.   Mild left pneumothorax. Left lateral pleural space pigtail catheter, not in continuity with the pneumothorax. Mild bilateral hydronephrosis, similar to appearance on CT of the abdomen and pelvis on April 27. noted above   s/p 2nd chest tube 5/5/21  cxr 5/6/21 with Tracheostomy and catheter left chest tubes remain. , There are significant diffuse advanced infiltrates again noted. No pneumothorax. Above findings are similar to study earlier in the day. Present film shows a left jugular line inserted   with tip entering the superior vena cava noted   cxr 5/11/21 with Heart magnified by technique. Tracheostomy, left jugular line, and 2 catheter left chest tubes remain. Quite advanced infiltration in the lungs particularly in the left lower lobe again noted.  There is a persistent rather small left apical pneumothorax. Chest is similar to May 10 noted    cxt 5/14/21 with Perihilar diffuse airspace disease and LEFT lower lobe retrocardiac airspace consolidation. LEFT chest tube overlies upper zone. Small LEFT apical less than 5% pneumothorax noted   cxr 5/17/21 with Similar infiltrates. Small left pneumothorax similar to the prior study noted.   cxr 5/19/21 with Persistent mild to moderate left pneumothorax despite chest tube noted   cxr 5/20 with Improved left pneumothorax. Significant infiltration particularly in the left lower lobe again noted   cxr 5/21/21 with No appreciable pneumothorax at this time. Persistent advanced infiltrates noted    cxr 5/22/21 with  Left chest pigtail. Status post tracheostomy. Heart size unremarkable. No pneumothorax. Multiple patchy opacities throughout the left lung. Status post gastrostomy. No dilated loops of bowel noted above.  s/p surgical placement of gastrostomy tube 4/23/2021.   abd xray 5/10/21 with ileus noted   dressing changed daily for seroma   abd xray 5/21/21 with Decreased bowel ileus compared to prior 5/20/2021 exam noted above.   abd xray 5/22/21 with No dilated loops of bowel noted above.  cont tube feeding   CT scan of the chest 5/13/21 demonstrates diffuse bilateral infiltrates with a region of consolidation at the left lung base. Small left pneumothorax. 2 left chest tubes noted in place noted above.  CT scan of the abdomen and pelvis 5/13/21 demonstrates diffuse dilatation of small and large bowel loops most consistent with ileus noted   xray abd with  5/14/21 with Ingested contrast within nonobstructed large bowel to the level of rectum. No acute radiographic intra-abdominal findings noted above.  id f/u   patient completed course of Meropenem  leukocytosis resolved  sputum cx with Enterobacter aerogenes (Carbapenem Resistant) noted above   tmx 99   ua positive    andrea changed  Completed  meropenem, s/p 1 dose of Vancomycin   completed zosyn  lispro ss   prognosis poor   s/p 4 units prbc for anemia  f/u h/h    transfuse prbc as needed  heme onc f/u  retic is elevated.    Haptoglobin normal  ? daily phlebotomy  no hemolysis, Fe/B12/folate adequate  hemolysis is unlikely even his baseline haptoglobin may be very elevated due to COVID  direct pamella neg noted    psych f/u  Reduce dose of Klonopin to 1 mg q8h standing (no PRNs), with plan to further taper as tolerated  Continue delirium precautions: Frequent reorientation, familiar pictures and objects at bedside, natural light in daytime,   consistent sleep/wake schedule, adequate hydration and nutrition, sensory aids (hearing aids, glasses) present if needed, minimizing noise and overstimulation,   clustering care to minimize overnight interruptions, judicious use of deliriogenic medications (anticholinergics, benzodiazepines and opioid analgesics), minimize use of restraints  cont current meds   mgmt as per icu

## 2021-05-23 NOTE — PROGRESS NOTE ADULT - ATTENDING COMMENTS
55 yr old  man , non smoker with  moody 1990s presented 3/14 with x9 days worsening cough, subjective fevers, and SOB, with x2-3 days dysuria and central, non-radiating, constant CP. Admitted to medicine unit  for acute hypoxic respiratory failure secondary to pna from covid-19 infection .     Assessment:  1. Acute hypoxic respiratory failure  2. Covid-19 infection   3. Transaminitis  4. Prediabetes  5. Bilateral pneumothorax  6. Septic shock - resolved  7. Anemia  8. bowel obstruction/ileus    Plan   -Better controlled anxiety  -Taper methadone as tolerated  -left apical PTX, improved  -chest tube to water seal   -CXR in PM   -S/p tracheostomy placement 4/21   -S/p G-tube 4/23  -Cont. mechanical ventilation   -Daily weaning trials  -Repeat ABG  -Off vasopressors  -PT evaluation  -On antibiotics again will complete 5 days   -C-diff PCR negative  -tube feeding held due to ileus, suspect due to opioid use  -Add movantik   -dvt/gi prophy  -hemodynamic monitoring   -Prognosis is guarded.

## 2021-05-23 NOTE — PROGRESS NOTE ADULT - SUBJECTIVE AND OBJECTIVE BOX
INTERVAL HPI/OVERNIGHT EVENTS: ***    PRESSORS: [ ] YES [ ] NO  WHICH:    ANTIBIOTICS:                      Antimicrobial:  trimethoprim  40 mG/sulfamethoxazole 200 mG Suspension 160 milliGRAM(s) Oral <User Schedule>    Cardiovascular:  midodrine 10 milliGRAM(s) Oral every 8 hours  phenylephrine    Infusion 1.5 MICROgram(s)/kG/Min IV Continuous <Continuous>    Pulmonary:  ALBUTerol    90 MICROgram(s) HFA Inhaler 2 Puff(s) Inhalation every 6 hours PRN    Hematalogic:  enoxaparin Injectable 40 milliGRAM(s) SubCutaneous every 24 hours    Other:  acetaminophen    Suspension .. 650 milliGRAM(s) Oral every 12 hours PRN  artificial  tears Solution 1 Drop(s) Both EYES every 4 hours PRN  ascorbic acid 1000 milliGRAM(s) Oral daily  BACItracin   Ointment 1 Application(s) Topical two times a day  bisacodyl Suppository 10 milliGRAM(s) Rectal daily  chlorhexidine 2% Cloths 1 Application(s) Topical <User Schedule>  clonazePAM  Tablet 1 milliGRAM(s) Oral every 8 hours  gabapentin 100 milliGRAM(s) Oral every 8 hours  insulin lispro (ADMELOG) corrective regimen sliding scale   SubCutaneous every 6 hours  lactulose Syrup 10 Gram(s) Oral daily  methadone    Tablet 10 milliGRAM(s) Oral <User Schedule>  methadone    Tablet 5 milliGRAM(s) Oral <User Schedule>  metoclopramide Injectable 10 milliGRAM(s) IV Push three times a day  naloxegol 25 milliGRAM(s) Oral daily  pantoprazole   Suspension 40 milliGRAM(s) Oral daily  predniSONE   Tablet 20 milliGRAM(s) Oral daily  QUEtiapine 75 milliGRAM(s) Oral two times a day  sodium chloride 0.9% lock flush 10 milliLiter(s) IV Push every 1 hour PRN  sodium chloride 0.9%. 1000 milliLiter(s) IV Continuous <Continuous>    acetaminophen    Suspension .. 650 milliGRAM(s) Oral every 12 hours PRN  ALBUTerol    90 MICROgram(s) HFA Inhaler 2 Puff(s) Inhalation every 6 hours PRN  artificial  tears Solution 1 Drop(s) Both EYES every 4 hours PRN  ascorbic acid 1000 milliGRAM(s) Oral daily  BACItracin   Ointment 1 Application(s) Topical two times a day  bisacodyl Suppository 10 milliGRAM(s) Rectal daily  chlorhexidine 2% Cloths 1 Application(s) Topical <User Schedule>  clonazePAM  Tablet 1 milliGRAM(s) Oral every 8 hours  enoxaparin Injectable 40 milliGRAM(s) SubCutaneous every 24 hours  gabapentin 100 milliGRAM(s) Oral every 8 hours  insulin lispro (ADMELOG) corrective regimen sliding scale   SubCutaneous every 6 hours  lactulose Syrup 10 Gram(s) Oral daily  methadone    Tablet 10 milliGRAM(s) Oral <User Schedule>  methadone    Tablet 5 milliGRAM(s) Oral <User Schedule>  metoclopramide Injectable 10 milliGRAM(s) IV Push three times a day  midodrine 10 milliGRAM(s) Oral every 8 hours  naloxegol 25 milliGRAM(s) Oral daily  pantoprazole   Suspension 40 milliGRAM(s) Oral daily  phenylephrine    Infusion 1.5 MICROgram(s)/kG/Min IV Continuous <Continuous>  predniSONE   Tablet 20 milliGRAM(s) Oral daily  QUEtiapine 75 milliGRAM(s) Oral two times a day  sodium chloride 0.9% lock flush 10 milliLiter(s) IV Push every 1 hour PRN  sodium chloride 0.9%. 1000 milliLiter(s) IV Continuous <Continuous>  trimethoprim  40 mG/sulfamethoxazole 200 mG Suspension 160 milliGRAM(s) Oral <User Schedule>    Drug Dosing Weight  Height (cm): 167.6 (2021 23:15)  Weight (kg): 83.9 (2021 23:15)  BMI (kg/m2): 29.9 (2021 23:15)  BSA (m2): 1.93 (2021 23:15)    CENTRAL LINE: [ ] YES [ ] NO  LOCATION:   DATE INSERTED:  REMOVE: [ ] YES [ ] NO  EXPLAIN:    RIVERA: [ ] YES [ ] NO    DATE INSERTED:  REMOVE:  [ ] YES [ ] NO  EXPLAIN:    A-LINE:  [ ] YES [ ] NO  LOCATION:   DATE INSERTED:  REMOVE:  [ ] YES [ ] NO  EXPLAIN:    PMH -reviewed admission note, no change since admission    ICU Vital Signs Last 24 Hrs  T(C): 37 (23 May 2021 00:00), Max: 37 (23 May 2021 00:00)  T(F): 98.6 (23 May 2021 00:00), Max: 98.6 (23 May 2021 00:00)  HR: 70 (23 May 2021 01:00) (58 - 116)  BP: 159/74 (23 May 2021 01:00) (86/63 - 159/74)  BP(mean): 96 (23 May 2021 01:00) (67 - 96)  ABP: --  ABP(mean): --  RR: 16 (23 May 2021 01:00) (12 - 32)  SpO2: 99% (23 May 2021 01:00) (94% - 100%)      ABG - ( 22 May 2021 12:17 )  pH, Arterial: 7.41  pH, Blood: x     /  pCO2: 53    /  pO2: 115   / HCO3: 34    / Base Excess: 7.3   /  SaO2: 99                    05- @ 07:01  -  - @ 07:00  --------------------------------------------------------  IN: 670 mL / OUT: 1300 mL / NET: -630 mL        Mode: AC/ CMV (Assist Control/ Continuous Mandatory Ventilation)  RR (machine): 22  TV (machine): 450  FiO2: 40  PEEP: 5  ITime: 1  MAP: 18  PIP: 31      PHYSICAL EXAM:    GENERAL: NAD, well-groomed, well-developed  HEAD:  Atraumatic, Normocephalic  EYES: EOMI, PERRLA, conjunctiva and sclera clear  ENMT: No tonsillar erythema, exudates, or enlargement; Moist mucous membranes, Good dentition, No lesions  NECK: Supple, normal appearance, No JVD; Normal thyroid; Trachea midline  NERVOUS SYSTEM:  Alert & Oriented X3, Good concentration; Motor Strength 5/5 B/L upper and lower extremities; DTRs 2+ intact and symmetric  CHEST/LUNG: No chest deformity; Normal percussion bilaterally; No rales, rhonchi, wheezing   HEART: Regular rate and rhythm; No murmurs, rubs, or gallops  ABDOMEN: Soft, Nontender, Nondistended; Bowel sounds present  EXTREMITIES:  2+ Peripheral Pulses, No clubbing, cyanosis, or edema  LYMPH: No lymphadenopathy noted  SKIN: No rashes or lesions; Good capillary refill      LABS:  CBC Full  -  ( 22 May 2021 04:23 )  WBC Count : 9.45 K/uL  RBC Count : 2.33 M/uL  Hemoglobin : 7.2 g/dL  Hematocrit : 23.9 %  Platelet Count - Automated : 165 K/uL  Mean Cell Volume : 102.6 fl  Mean Cell Hemoglobin : 30.9 pg  Mean Cell Hemoglobin Concentration : 30.1 gm/dL  Auto Neutrophil # : x  Auto Lymphocyte # : x  Auto Monocyte # : x  Auto Eosinophil # : x  Auto Basophil # : x  Auto Neutrophil % : x  Auto Lymphocyte % : x  Auto Monocyte % : x  Auto Eosinophil % : x  Auto Basophil % : x        143  |  104  |  24<H>  ----------------------------<  72  3.6   |  31  |  1.30    Ca    8.4      22 May 2021 04:23  Phos  3.0       Mg     2.3         TPro  5.8<L>  /  Alb  2.6<L>  /  TBili  0.9  /  DBili  x   /  AST  21  /  ALT  26  /  AlkPhos  71  -      Urinalysis Basic - ( 21 May 2021 12:08 )    Color: Yellow / Appearance: Hazy / S.010 / pH: x  Gluc: x / Ketone: Negative  / Bili: Negative / Urobili: Negative   Blood: x / Protein: 30 mg/dL / Nitrite: Negative   Leuk Esterase: Negative / RBC: >50 /HPF / WBC 0-2 /HPF   Sq Epi: x / Non Sq Epi: Few /HPF / Bacteria: Trace /HPF          RADIOLOGY & ADDITIONAL STUDIES REVIEWED:  ***    GOALS OF CARE DISCUSSION WITH PATIENT/FAMILY/PROXY:    CRITICAL CARE TIME SPENT: 35 minutes INTERVAL HPI/OVERNIGHT EVENTS: Rectal tube removed. Started on movantik yesterday. CXR showing improvement in pneumothorax.     PRESSORS: No    Antimicrobial:  trimethoprim  40 mG/sulfamethoxazole 200 mG Suspension 160 milliGRAM(s) Oral <User Schedule>    Cardiovascular:  midodrine 10 milliGRAM(s) Oral every 8 hours  phenylephrine    Infusion 1.5 MICROgram(s)/kG/Min IV Continuous <Continuous>    Pulmonary:  ALBUTerol    90 MICROgram(s) HFA Inhaler 2 Puff(s) Inhalation every 6 hours PRN    Hematalogic:  enoxaparin Injectable 40 milliGRAM(s) SubCutaneous every 24 hours    Other:  acetaminophen    Suspension .. 650 milliGRAM(s) Oral every 12 hours PRN  artificial  tears Solution 1 Drop(s) Both EYES every 4 hours PRN  ascorbic acid 1000 milliGRAM(s) Oral daily  BACItracin   Ointment 1 Application(s) Topical two times a day  bisacodyl Suppository 10 milliGRAM(s) Rectal daily  chlorhexidine 2% Cloths 1 Application(s) Topical <User Schedule>  clonazePAM  Tablet 1 milliGRAM(s) Oral every 8 hours  gabapentin 100 milliGRAM(s) Oral every 8 hours  insulin lispro (ADMELOG) corrective regimen sliding scale   SubCutaneous every 6 hours  lactulose Syrup 10 Gram(s) Oral daily  methadone    Tablet 10 milliGRAM(s) Oral <User Schedule>  methadone    Tablet 5 milliGRAM(s) Oral <User Schedule>  metoclopramide Injectable 10 milliGRAM(s) IV Push three times a day  naloxegol 25 milliGRAM(s) Oral daily  pantoprazole   Suspension 40 milliGRAM(s) Oral daily  predniSONE   Tablet 20 milliGRAM(s) Oral daily  QUEtiapine 75 milliGRAM(s) Oral two times a day  sodium chloride 0.9% lock flush 10 milliLiter(s) IV Push every 1 hour PRN  sodium chloride 0.9%. 1000 milliLiter(s) IV Continuous <Continuous>    acetaminophen    Suspension .. 650 milliGRAM(s) Oral every 12 hours PRN  ALBUTerol    90 MICROgram(s) HFA Inhaler 2 Puff(s) Inhalation every 6 hours PRN  artificial  tears Solution 1 Drop(s) Both EYES every 4 hours PRN  ascorbic acid 1000 milliGRAM(s) Oral daily  BACItracin   Ointment 1 Application(s) Topical two times a day  bisacodyl Suppository 10 milliGRAM(s) Rectal daily  chlorhexidine 2% Cloths 1 Application(s) Topical <User Schedule>  clonazePAM  Tablet 1 milliGRAM(s) Oral every 8 hours  enoxaparin Injectable 40 milliGRAM(s) SubCutaneous every 24 hours  gabapentin 100 milliGRAM(s) Oral every 8 hours  insulin lispro (ADMELOG) corrective regimen sliding scale   SubCutaneous every 6 hours  lactulose Syrup 10 Gram(s) Oral daily  methadone    Tablet 10 milliGRAM(s) Oral <User Schedule>  methadone    Tablet 5 milliGRAM(s) Oral <User Schedule>  metoclopramide Injectable 10 milliGRAM(s) IV Push three times a day  midodrine 10 milliGRAM(s) Oral every 8 hours  naloxegol 25 milliGRAM(s) Oral daily  pantoprazole   Suspension 40 milliGRAM(s) Oral daily  phenylephrine    Infusion 1.5 MICROgram(s)/kG/Min IV Continuous <Continuous>  predniSONE   Tablet 20 milliGRAM(s) Oral daily  QUEtiapine 75 milliGRAM(s) Oral two times a day  sodium chloride 0.9% lock flush 10 milliLiter(s) IV Push every 1 hour PRN  sodium chloride 0.9%. 1000 milliLiter(s) IV Continuous <Continuous>  trimethoprim  40 mG/sulfamethoxazole 200 mG Suspension 160 milliGRAM(s) Oral <User Schedule>    Drug Dosing Weight  Height (cm): 167.6 (2021 23:15)  Weight (kg): 83.9 (2021 23:15)  BMI (kg/m2): 29.9 (2021 23:15)  BSA (m2): 1.93 (2021 23:15)    CENTRAL LINE: No    RIVERA: Yes   DATE INSERTED:   REMOVE: No    A-LINE: No    PMH -reviewed admission note, no change since admission    ICU Vital Signs Last 24 Hrs  T(C): 37 (23 May 2021 00:00), Max: 37 (23 May 2021 00:00)  T(F): 98.6 (23 May 2021 00:00), Max: 98.6 (23 May 2021 00:00)  HR: 70 (23 May 2021 01:00) (58 - 116)  BP: 159/74 (23 May 2021 01:00) (86/63 - 159/74)  BP(mean): 96 (23 May 2021 01:00) (67 - 96)  RR: 16 (23 May 2021 01:00) (12 - 32)  SpO2: 99% (23 May 2021 01:00) (94% - 100%)      ABG - ( 22 May 2021 12:17 )  pH, Arterial: 7.41  pH, Blood: x     /  pCO2: 53    /  pO2: 115   / HCO3: 34    / Base Excess: 7.3   /  SaO2: 99             @ 07:01  -   @ 07:00  --------------------------------------------------------  IN: 670 mL / OUT: 1300 mL / NET: -630 mL        Mode: AC/ CMV (Assist Control/ Continuous Mandatory Ventilation)  RR (machine): 22  TV (machine): 450  FiO2: 40  PEEP: 5  ITime: 1  MAP: 18  PIP: 31      PHYSICAL EXAM:  GENERAL: patient seen resting in bed and in no apparent distress   HEAD: Atraumatic, Normocephalic  EYES: PERRLA, conjunctiva and sclera clear  ENMT: Moist mucous membranes, Good dentition, No lesions  NECK: Supple, normal appearance   NERVOUS SYSTEM:  Alert & Oriented, Good concentration   CHEST/LUNG: No chest deformity; crackles present to auscultation    HEART: Regular rate and rhythm; No murmurs   ABDOMEN: Soft, Nontender, Nondistended; Bowel sounds present  EXTREMITIES: No clubbing, cyanosis, or edema  LYMPH: No lymphadenopathy noted  SKIN: No rashes or lesions; Good capillary refill      LABS:  CBC Full  -  ( 22 May 2021 04:23 )  WBC Count : 9.45 K/uL  RBC Count : 2.33 M/uL  Hemoglobin : 7.2 g/dL  Hematocrit : 23.9 %  Platelet Count - Automated : 165 K/uL  Mean Cell Volume : 102.6 fl  Mean Cell Hemoglobin : 30.9 pg  Mean Cell Hemoglobin Concentration : 30.1 gm/dL          143  |  104  |  24<H>  ----------------------------<  72  3.6   |  31  |  1.30    Ca    8.4      22 May 2021 04:23  Phos  3.0       Mg     2.3         TPro  5.8<L>  /  Alb  2.6<L>  /  TBili  0.9  /  DBili  x   /  AST  21  /  ALT  26  /  AlkPhos  71      Urinalysis Basic - ( 21 May 2021 12:08 )    Color: Yellow / Appearance: Hazy / S.010 / pH: x  Gluc: x / Ketone: Negative  / Bili: Negative / Urobili: Negative   Blood: x / Protein: 30 mg/dL / Nitrite: Negative   Leuk Esterase: Negative / RBC: >50 /HPF / WBC 0-2 /HPF   Sq Epi: x / Non Sq Epi: Few /HPF / Bacteria: Trace /HPF      RADIOLOGY & ADDITIONAL STUDIES REVIEWED: < from: Xray Chest 1 View- PORTABLE-Routine (Xray Chest 1 View- PORTABLE-Routine in AM.) (21 @ 10:11) >  Impression: Left chest pigtail. Status post tracheostomy. Heart size unremarkable. No pneumothorax. Multiple patchy opacities throughout the left lung.    Status post gastrostomy. No dilated loops of bowel. Unable to evaluate for free air on this projection.    < end of copied text >    GOALS OF CARE DISCUSSION WITH PATIENT/FAMILY/PROXY: full code     CRITICAL CARE TIME SPENT: 35 minutes

## 2021-05-23 NOTE — PROGRESS NOTE ADULT - ASSESSMENT
Assessment and plan: 54 y/o M with no PMH is admitted to ICU for AHRF 2/2 covid19 pna     1. Acute hypoxic respiratory failure  2. ARDS 2/2 Covid pneumonia  3. Pneumothorax  4. Prediabetes  5. Abnormal TSH     Neuro  -Alert and oriented x 3 at baseline   -Off all drips, and calm  -C/w Klonopin to 1mg q8h  -C/w Seroquel 75 mg BID   -Decreased methadone to 10/5/10  - d/c Ativan PRN, fentanyl PRN  -CT head unremarkable   -Low concern for malignant hypothermia, NMS, or serotonin syndrome given waxing/waning and lack of inciting medications    Cardiovascular  # Shock   Was hypotensive again, restarted pheny  Clonidine was d/c     Pulm  #Acute hypoxic respiratory failure: secondary to Covid19 pneumonia  #ARDS  -Was on HFNC then got intubated 3/29  -Trach 4/22 continues on Vent   - Remdesivir was discontinued due to positive antibodies   - Finished Dexamethasone   - Covid19 PCR negative now, off isolation   - Daily SBT, difficulty tolerating 2/2 to agitation  - RR inc to 32 o/n for CO2 retention  - decrease rr to 22, inc tv 450    # Bacterial Pneumonia  - Stable infiltrate on CXR ,likely developed Bacterial pneumonia   - Completed ABx Zosyn, meropenem, s/p 1 dose of Vancomycin  - MRSA negative from 3/26, 5/18  - Sputum Cx growing few gram negative rods, CRE - Contact isolation  - Taper prednisone to 30 daily (5/11) for possible organizing pneumonia   - C/w bactrim empirically, TIW for PCP PPX  - Secretions from the trach, needs frequent suctions   - Pulm. Dr. Ryan  - ID Dr Anand   - Was found to have fevers 5/17  Was started on vanc and zosyn  MRSA is negative, will d/c vanc  c/w zosyn. will decrease dose due to renal impairment    #Pneumothorax and pneumomediastinum:   -Thoracic surgery following  -s/p Chest tube placed 4/8 pigtail to wall suction and d/c on 4/15  -On 4/18 chest tube replaced for recurrence of PTX- c/w chest tube to suction     -anterior chest tube placed 5/6 for worsening pneumothorax with resolution of PTX  5/10 CXR with recurrence of apical pneumo - tube adjusted with improvement however still persisting but stable  -Repeat Chest CT shows persistent PTX  -Water seal inferior CT -PTX was increased, readjusted tube as it was kinked.  - Lateral Ct was removed, repeat CXr showed developing of PTX in left lower lobe, resolving slowly  -Surgery was informed, following the case closely    ID  Likely bacterial pneumonia   -leukocytosis improved 9k  -Febrile 100.4 5/18 - last episode  Was started on vanc and zosyn  MRSA is negative, will d/c vanc  dc zosyn 5/22 s/p 5 days of zosyn    Nephro  -Pitting edema to both arms, ecchymosis on right AC, blisters on left arm around brachial area    -Was retaining urine, andrea was placed 5/5  -DC doxazosin for borderline BPs  potassium and phosphorus replaced this morning; monitor electrolytes     JARRED:  Patient has elevated creatinine compared to baseline, creatinine clearance is trending up.  Will start on mild IV hydration  ua shows proteinuria, triple phosphate crystals, large blood >50 rbc  FENA 6.6%, post obstructive  Monitor BMP    GI  Transaminitis:   likely secondary to Covid19, Resolved   hepatitis panel -ve  -continue to monitor LFT    Ileus  Abd xray with ileus, CT showing the same 5/15  Restart trickle feeds  G Tube off suction now  Rectal tube in place  C/w Miralax BID standing and reglan   G tube 4/23, - dressing changed daily for seroma  Patient was found to have ileus again on 5/20  Was started on lactulose suppository, NPO for now  Will monitor and slowly start on tube feeds  Cdiff negative  Start movantik    Heme  Elevated d-dimer: likely secondary to Covid19   -D-dimer 423 on admission  -Last D-dimer 1434  - No active bleeding, restarted lovenox daily    Anemia  S/p 4 PRBC total  - Now Hg stable 7-9  CTH and CT abdomen w/o contrast no evidence of bleed    No active signs of bleeding (No hematemesis, melena or hematuria)  Hemolysis w/u- negative  Iron studies show ACD  Dr Andrade on board   F/u CBC daily     Endocrine  Prediabetes:  -A1c 5.8  -BS controlled  -continue HSS    Abnormal TSH:  -TSH level noted 0.26,   -Repeat TSH 0.37 and Free T4 1.82    Skin/Catheters  No rashes. Peripheral IV lines.   LIJ  Both arms with edema, right AC with ecchymosis  doppler of LUE was negative    Prophylaxis   On Lovenox for DVT   Protonix for GI proph    GOC  FULL CODE Assessment and plan: 54 y/o M with no PMH is admitted to ICU for AHRF 2/2 covid19 pna     1. Acute hypoxic respiratory failure  2. ARDS 2/2 Covid pneumonia  3. Pneumothorax  4. Prediabetes  5. Abnormal TSH     Neuro  -Alert and oriented x 3 at baseline   -Off all drips, and calm  -C/w Klonopin to 1mg q8h  -C/w Seroquel 75 mg BID   -Decreased methadone to 10/5/10  - d/c Ativan PRN, fentanyl PRN  -CT head unremarkable   -Low concern for malignant hypothermia, NMS, or serotonin syndrome given waxing/waning and lack of inciting medications    Cardiovascular  # Shock   now off pressors   Clonidine was d/c     Pulm  #Acute hypoxic respiratory failure: secondary to Covid19 pneumonia  #ARDS  -Was on HFNC then got intubated 3/29  -Trach 4/22 continues on Vent   - Remdesivir was discontinued due to positive antibodies   - Finished Dexamethasone   - Covid19 PCR negative now, off isolation   - Daily SBT, difficulty tolerating 2/2 to agitation  - RR inc to 32 o/n for CO2 retention  - decrease rr to 22, inc tv 450    # Bacterial Pneumonia  - Stable infiltrate on CXR ,likely developed Bacterial pneumonia   - Completed ABx Zosyn, meropenem, s/p 1 dose of Vancomycin  - MRSA negative from 3/26, 5/18  - Sputum Cx growing few gram negative rods, CRE - Contact isolation  - Taper prednisone to 30 daily (5/11) for possible organizing pneumonia   - C/w bactrim empirically, TIW for PCP PPX  - Secretions from the trach, needs frequent suctions   - Pulm. Dr. Ryan  - ID Dr Anand   - Was found to have fevers 5/17  Was started on vanc and zosyn  MRSA is negative, will d/c vanc  zosyn now dc     #Pneumothorax and pneumomediastinum:   -Thoracic surgery following  -s/p Chest tube placed 4/8 pigtail to wall suction and d/c on 4/15  -On 4/18 chest tube replaced for recurrence of PTX- c/w chest tube to suction     -anterior chest tube placed 5/6 for worsening pneumothorax with resolution of PTX  5/10 CXR with recurrence of apical pneumo - tube adjusted with improvement however still persisting but stable  -Repeat Chest CT shows persistent PTX  -Water seal inferior CT -PTX was increased, readjusted tube as it was kinked.  - Lateral Ct was removed, repeat CXr showed developing of PTX in left lower lobe, resolving slowly  -Surgery was informed, following the case closely  - 5/23: plan to place CT to waterseal and repeat CXR in PM     ID  Likely bacterial pneumonia   -leukocytosis improved 9k  -Febrile 100.4 5/18 - last episode  Was started on vanc and zosyn  MRSA is negative, will d/c vanc  dc zosyn 5/22 s/p 5 days of zosyn    Nephro  -Pitting edema to both arms, ecchymosis on right AC, blisters on left arm around brachial area    -Was retaining urine, andrea was placed 5/5  -DC doxazosin for borderline BPs  potassium and phosphorus replaced this morning; monitor electrolytes     JARRED:  Patient has elevated creatinine compared to baseline, creatinine clearance is trending up.  Will start on mild IV hydration  ua shows proteinuria, triple phosphate crystals, large blood >50 rbc  FENA 6.6%, post obstructive  Monitor BMP    GI  Transaminitis:   likely secondary to Covid19, Resolved   hepatitis panel -ve  -continue to monitor LFT    Ileus  Abd xray with ileus, CT showing the same 5/15  Restart trickle feeds  G Tube off suction now  Rectal tube in place  C/w Miralax BID standing and reglan   G tube 4/23, - dressing changed daily for seroma  Patient was found to have ileus again on 5/20  Was started on lactulose suppository, NPO for now  Will monitor and slowly start on tube feeds  Cdiff negative  Start movantik    Heme  Elevated d-dimer: likely secondary to Covid19   -D-dimer 423 on admission  -Last D-dimer 1434  - No active bleeding, restarted lovenox daily    Anemia  S/p 4 PRBC total  - Now Hg stable 7-9  CTH and CT abdomen w/o contrast no evidence of bleed    No active signs of bleeding (No hematemesis, melena or hematuria)  Hemolysis w/u- negative  Iron studies show ACD  Dr Andrade on board   F/u CBC daily     Endocrine  Prediabetes:  -A1c 5.8  -BS controlled  -continue HSS    Abnormal TSH:  -TSH level noted 0.26,   -Repeat TSH 0.37 and Free T4 1.82    Skin/Catheters  No rashes. Peripheral IV lines.   Both arms with edema, right AC with ecchymosis  doppler of LUE was negative    Prophylaxis   On Lovenox for DVT   Protonix for GI proph    GOC  FULL CODE

## 2021-05-24 LAB
ALBUMIN SERPL ELPH-MCNC: 2.9 G/DL — LOW (ref 3.5–5)
ALP SERPL-CCNC: 71 U/L — SIGNIFICANT CHANGE UP (ref 40–120)
ALT FLD-CCNC: 33 U/L DA — SIGNIFICANT CHANGE UP (ref 10–60)
ANION GAP SERPL CALC-SCNC: 4 MMOL/L — LOW (ref 5–17)
ANION GAP SERPL CALC-SCNC: 4 MMOL/L — LOW (ref 5–17)
AST SERPL-CCNC: 26 U/L — SIGNIFICANT CHANGE UP (ref 10–40)
BASE EXCESS BLDA CALC-SCNC: 4.8 MMOL/L — HIGH (ref -2–3)
BASE EXCESS BLDA CALC-SCNC: 6.4 MMOL/L — HIGH (ref -2–3)
BILIRUB SERPL-MCNC: 1.1 MG/DL — SIGNIFICANT CHANGE UP (ref 0.2–1.2)
BLOOD GAS COMMENTS ARTERIAL: SIGNIFICANT CHANGE UP
BLOOD GAS COMMENTS ARTERIAL: SIGNIFICANT CHANGE UP
BUN SERPL-MCNC: 15 MG/DL — SIGNIFICANT CHANGE UP (ref 7–18)
BUN SERPL-MCNC: 18 MG/DL — SIGNIFICANT CHANGE UP (ref 7–18)
CALCIUM SERPL-MCNC: 8.7 MG/DL — SIGNIFICANT CHANGE UP (ref 8.4–10.5)
CALCIUM SERPL-MCNC: 8.9 MG/DL — SIGNIFICANT CHANGE UP (ref 8.4–10.5)
CHLORIDE SERPL-SCNC: 105 MMOL/L — SIGNIFICANT CHANGE UP (ref 96–108)
CHLORIDE SERPL-SCNC: 106 MMOL/L — SIGNIFICANT CHANGE UP (ref 96–108)
CO2 SERPL-SCNC: 31 MMOL/L — SIGNIFICANT CHANGE UP (ref 22–31)
CO2 SERPL-SCNC: 32 MMOL/L — HIGH (ref 22–31)
CREAT SERPL-MCNC: 0.97 MG/DL — SIGNIFICANT CHANGE UP (ref 0.5–1.3)
CREAT SERPL-MCNC: 1.01 MG/DL — SIGNIFICANT CHANGE UP (ref 0.5–1.3)
GLUCOSE BLDC GLUCOMTR-MCNC: 129 MG/DL — HIGH (ref 70–99)
GLUCOSE BLDC GLUCOMTR-MCNC: 133 MG/DL — HIGH (ref 70–99)
GLUCOSE BLDC GLUCOMTR-MCNC: 163 MG/DL — HIGH (ref 70–99)
GLUCOSE BLDC GLUCOMTR-MCNC: 78 MG/DL — SIGNIFICANT CHANGE UP (ref 70–99)
GLUCOSE BLDC GLUCOMTR-MCNC: 84 MG/DL — SIGNIFICANT CHANGE UP (ref 70–99)
GLUCOSE SERPL-MCNC: 126 MG/DL — HIGH (ref 70–99)
GLUCOSE SERPL-MCNC: 71 MG/DL — SIGNIFICANT CHANGE UP (ref 70–99)
HCO3 BLDA-SCNC: 31 MMOL/L — HIGH (ref 21–28)
HCO3 BLDA-SCNC: 33 MMOL/L — HIGH (ref 21–28)
HCT VFR BLD CALC: 27.1 % — LOW (ref 39–50)
HGB BLD-MCNC: 8.9 G/DL — LOW (ref 13–17)
HOROWITZ INDEX BLDA+IHG-RTO: 40 — SIGNIFICANT CHANGE UP
HOROWITZ INDEX BLDA+IHG-RTO: 40 — SIGNIFICANT CHANGE UP
MAGNESIUM SERPL-MCNC: 2 MG/DL — SIGNIFICANT CHANGE UP (ref 1.6–2.6)
MCHC RBC-ENTMCNC: 32.8 GM/DL — SIGNIFICANT CHANGE UP (ref 32–36)
MCHC RBC-ENTMCNC: 34.1 PG — HIGH (ref 27–34)
MCV RBC AUTO: 103.8 FL — HIGH (ref 80–100)
NRBC # BLD: 0 /100 WBCS — SIGNIFICANT CHANGE UP (ref 0–0)
PCO2 BLDA: 45 MMHG — SIGNIFICANT CHANGE UP (ref 35–48)
PCO2 BLDA: 66 MMHG — HIGH (ref 35–48)
PH BLDA: 7.31 — LOW (ref 7.35–7.45)
PH BLDA: 7.45 — SIGNIFICANT CHANGE UP (ref 7.35–7.45)
PHOSPHATE SERPL-MCNC: 2.7 MG/DL — SIGNIFICANT CHANGE UP (ref 2.5–4.5)
PLATELET # BLD AUTO: 223 K/UL — SIGNIFICANT CHANGE UP (ref 150–400)
PO2 BLDA: 148 MMHG — HIGH (ref 83–108)
PO2 BLDA: 95 MMHG — SIGNIFICANT CHANGE UP (ref 83–108)
POTASSIUM SERPL-MCNC: 3.3 MMOL/L — LOW (ref 3.5–5.3)
POTASSIUM SERPL-MCNC: 5.1 MMOL/L — SIGNIFICANT CHANGE UP (ref 3.5–5.3)
POTASSIUM SERPL-SCNC: 3.3 MMOL/L — LOW (ref 3.5–5.3)
POTASSIUM SERPL-SCNC: 5.1 MMOL/L — SIGNIFICANT CHANGE UP (ref 3.5–5.3)
PROT SERPL-MCNC: 6.2 G/DL — SIGNIFICANT CHANGE UP (ref 6–8.3)
RBC # BLD: 2.61 M/UL — LOW (ref 4.2–5.8)
RBC # FLD: 17.1 % — HIGH (ref 10.3–14.5)
SAO2 % BLDA: 100 % — SIGNIFICANT CHANGE UP
SAO2 % BLDA: 99 % — SIGNIFICANT CHANGE UP
SODIUM SERPL-SCNC: 141 MMOL/L — SIGNIFICANT CHANGE UP (ref 135–145)
SODIUM SERPL-SCNC: 141 MMOL/L — SIGNIFICANT CHANGE UP (ref 135–145)
WBC # BLD: 8.43 K/UL — SIGNIFICANT CHANGE UP (ref 3.8–10.5)
WBC # FLD AUTO: 8.43 K/UL — SIGNIFICANT CHANGE UP (ref 3.8–10.5)

## 2021-05-24 PROCEDURE — 99232 SBSQ HOSP IP/OBS MODERATE 35: CPT

## 2021-05-24 PROCEDURE — 74018 RADEX ABDOMEN 1 VIEW: CPT | Mod: 26

## 2021-05-24 PROCEDURE — 71045 X-RAY EXAM CHEST 1 VIEW: CPT | Mod: 26

## 2021-05-24 PROCEDURE — 71045 X-RAY EXAM CHEST 1 VIEW: CPT | Mod: 26,77

## 2021-05-24 RX ORDER — POTASSIUM CHLORIDE 20 MEQ
40 PACKET (EA) ORAL EVERY 4 HOURS
Refills: 0 | Status: COMPLETED | OUTPATIENT
Start: 2021-05-24 | End: 2021-05-24

## 2021-05-24 RX ORDER — POTASSIUM CHLORIDE 20 MEQ
40 PACKET (EA) ORAL
Refills: 0 | Status: DISCONTINUED | OUTPATIENT
Start: 2021-05-24 | End: 2021-05-24

## 2021-05-24 RX ORDER — METHADONE HYDROCHLORIDE 40 MG/1
5 TABLET ORAL EVERY 8 HOURS
Refills: 0 | Status: DISCONTINUED | OUTPATIENT
Start: 2021-05-24 | End: 2021-05-24

## 2021-05-24 RX ORDER — MIDODRINE HYDROCHLORIDE 2.5 MG/1
5 TABLET ORAL EVERY 8 HOURS
Refills: 0 | Status: DISCONTINUED | OUTPATIENT
Start: 2021-05-24 | End: 2021-05-25

## 2021-05-24 RX ORDER — QUETIAPINE FUMARATE 200 MG/1
50 TABLET, FILM COATED ORAL
Refills: 0 | Status: DISCONTINUED | OUTPATIENT
Start: 2021-05-24 | End: 2021-06-11

## 2021-05-24 RX ORDER — SODIUM CHLORIDE 9 MG/ML
1000 INJECTION, SOLUTION INTRAVENOUS
Refills: 0 | Status: DISCONTINUED | OUTPATIENT
Start: 2021-05-24 | End: 2021-05-24

## 2021-05-24 RX ORDER — METHADONE HYDROCHLORIDE 40 MG/1
5 TABLET ORAL EVERY 8 HOURS
Refills: 0 | Status: DISCONTINUED | OUTPATIENT
Start: 2021-05-24 | End: 2021-05-25

## 2021-05-24 RX ORDER — DEXTROSE 50 % IN WATER 50 %
50 SYRINGE (ML) INTRAVENOUS ONCE
Refills: 0 | Status: COMPLETED | OUTPATIENT
Start: 2021-05-24 | End: 2021-05-24

## 2021-05-24 RX ADMIN — CHLORHEXIDINE GLUCONATE 1 APPLICATION(S): 213 SOLUTION TOPICAL at 05:23

## 2021-05-24 RX ADMIN — PANTOPRAZOLE SODIUM 40 MILLIGRAM(S): 20 TABLET, DELAYED RELEASE ORAL at 11:51

## 2021-05-24 RX ADMIN — Medication 20 MILLIGRAM(S): at 05:23

## 2021-05-24 RX ADMIN — Medication 1 MILLIGRAM(S): at 21:27

## 2021-05-24 RX ADMIN — METHADONE HYDROCHLORIDE 10 MILLIGRAM(S): 40 TABLET ORAL at 05:22

## 2021-05-24 RX ADMIN — MIDODRINE HYDROCHLORIDE 10 MILLIGRAM(S): 2.5 TABLET ORAL at 05:21

## 2021-05-24 RX ADMIN — METHADONE HYDROCHLORIDE 5 MILLIGRAM(S): 40 TABLET ORAL at 13:13

## 2021-05-24 RX ADMIN — GABAPENTIN 100 MILLIGRAM(S): 400 CAPSULE ORAL at 21:26

## 2021-05-24 RX ADMIN — MIDODRINE HYDROCHLORIDE 5 MILLIGRAM(S): 2.5 TABLET ORAL at 21:26

## 2021-05-24 RX ADMIN — Medication 40 MILLIEQUIVALENT(S): at 11:52

## 2021-05-24 RX ADMIN — Medication 10 MILLIGRAM(S): at 11:57

## 2021-05-24 RX ADMIN — Medication 1000 MILLIGRAM(S): at 11:51

## 2021-05-24 RX ADMIN — NALOXEGOL OXALATE 25 MILLIGRAM(S): 12.5 TABLET, FILM COATED ORAL at 11:54

## 2021-05-24 RX ADMIN — LACTULOSE 10 GRAM(S): 10 SOLUTION ORAL at 11:50

## 2021-05-24 RX ADMIN — Medication 1 APPLICATION(S): at 05:20

## 2021-05-24 RX ADMIN — QUETIAPINE FUMARATE 50 MILLIGRAM(S): 200 TABLET, FILM COATED ORAL at 17:13

## 2021-05-24 RX ADMIN — QUETIAPINE FUMARATE 75 MILLIGRAM(S): 200 TABLET, FILM COATED ORAL at 05:21

## 2021-05-24 RX ADMIN — Medication 1 APPLICATION(S): at 17:13

## 2021-05-24 RX ADMIN — Medication 160 MILLIGRAM(S): at 05:21

## 2021-05-24 RX ADMIN — SODIUM CHLORIDE 50 MILLILITER(S): 9 INJECTION, SOLUTION INTRAVENOUS at 05:47

## 2021-05-24 RX ADMIN — ENOXAPARIN SODIUM 40 MILLIGRAM(S): 100 INJECTION SUBCUTANEOUS at 11:52

## 2021-05-24 RX ADMIN — Medication 1 MILLIGRAM(S): at 05:22

## 2021-05-24 RX ADMIN — METHADONE HYDROCHLORIDE 5 MILLIGRAM(S): 40 TABLET ORAL at 21:26

## 2021-05-24 RX ADMIN — GABAPENTIN 100 MILLIGRAM(S): 400 CAPSULE ORAL at 05:21

## 2021-05-24 RX ADMIN — Medication 50 MILLILITER(S): at 05:20

## 2021-05-24 RX ADMIN — Medication 10 MILLIGRAM(S): at 05:23

## 2021-05-24 RX ADMIN — Medication 40 MILLIEQUIVALENT(S): at 05:20

## 2021-05-24 RX ADMIN — Medication 1 MILLIGRAM(S): at 13:13

## 2021-05-24 RX ADMIN — GABAPENTIN 100 MILLIGRAM(S): 400 CAPSULE ORAL at 13:13

## 2021-05-24 NOTE — PROGRESS NOTE ADULT - SUBJECTIVE AND OBJECTIVE BOX
Patient is a 55y old  Male who presents with a chief complaint of SOB (23 May 2021 12:03)    pt seen in icu [ x ], reg med floor [   ], bed [ x ], chair at bedside [   ], awake and responsive [  ], mildly sedated, [ x ],    nad [x  ]        Allergies    No Known Allergies        Vitals    T(F): 97.8 (05-24-21 @ 04:00), Max: 98.9 (05-23-21 @ 08:00)  HR: 72 (05-24-21 @ 06:00) (57 - 107)  BP: 141/80 (05-24-21 @ 06:00) (111/58 - 145/68)  RR: 22 (05-24-21 @ 06:00) (15 - 26)  SpO2: 100% (05-24-21 @ 06:00) (95% - 100%)  Wt(kg): --  CAPILLARY BLOOD GLUCOSE      POCT Blood Glucose.: 163 mg/dL (24 May 2021 05:49)      Labs                          8.9    8.43  )-----------( 223      ( 24 May 2021 03:46 )             27.1       05-24    141  |  105  |  18  ----------------------------<  71  3.3<L>   |  32<H>  |  1.01    Ca    8.7      24 May 2021 03:46  Phos  2.7     05-24  Mg     2.0     05-24    TPro  6.2  /  Alb  2.9<L>  /  TBili  1.1  /  DBili  x   /  AST  26  /  ALT  33  /  AlkPhos  71  05-24            .Sputum Sputum  04-16 @ 04:42   Moderate Enterobacter aerogenes (Carbapenem Resistant)  Normal Respiratory Monserrat present  --  Enterobacter aerogenes (Carbapenem Resistant)      .Urine Clean Catch (Midstream)  03-15 @ 00:52   No growth  --  --          Radiology Results      Meds    MEDICATIONS  (STANDING):  ascorbic acid 1000 milliGRAM(s) Oral daily  BACItracin   Ointment 1 Application(s) Topical two times a day  bisacodyl Suppository 10 milliGRAM(s) Rectal daily  chlorhexidine 2% Cloths 1 Application(s) Topical <User Schedule>  clonazePAM  Tablet 1 milliGRAM(s) Oral every 8 hours  dextrose 5% + sodium chloride 0.9%. 1000 milliLiter(s) (50 mL/Hr) IV Continuous <Continuous>  enoxaparin Injectable 40 milliGRAM(s) SubCutaneous every 24 hours  gabapentin 100 milliGRAM(s) Oral every 8 hours  insulin lispro (ADMELOG) corrective regimen sliding scale   SubCutaneous every 6 hours  lactulose Syrup 10 Gram(s) Oral daily  methadone    Tablet 10 milliGRAM(s) Oral <User Schedule>  methadone    Tablet 5 milliGRAM(s) Oral <User Schedule>  metoclopramide Injectable 10 milliGRAM(s) IV Push three times a day  midodrine 10 milliGRAM(s) Oral every 8 hours  naloxegol 25 milliGRAM(s) Oral daily  pantoprazole   Suspension 40 milliGRAM(s) Oral daily  potassium chloride   Powder 40 milliEquivalent(s) Oral every 4 hours  predniSONE   Tablet 20 milliGRAM(s) Oral daily  QUEtiapine 75 milliGRAM(s) Oral two times a day  trimethoprim  40 mG/sulfamethoxazole 200 mG Suspension 160 milliGRAM(s) Oral <User Schedule>      MEDICATIONS  (PRN):  acetaminophen    Suspension .. 650 milliGRAM(s) Oral every 12 hours PRN Temp greater or equal to 38C (100.4F)  ALBUTerol    90 MICROgram(s) HFA Inhaler 2 Puff(s) Inhalation every 6 hours PRN Shortness of Breath and/or Wheezing  artificial  tears Solution 1 Drop(s) Both EYES every 4 hours PRN Dry Eyes  sodium chloride 0.9% lock flush 10 milliLiter(s) IV Push every 1 hour PRN Pre/post blood products, medications, blood draw, and to maintain line patency      Physical Exam    Neuro :  no focal deficits  Respiratory: CTA B/L  CV: RRR, S1S2, no murmurs,   Abdominal: Soft, NT, ND +BS, gastrostomy tube inplace  Extremities: edema of extrem, + peripheral pulses      ASSESSMENT      Hypoxemia 2nd to covid pna   transaminitis  prediabetes  h/o appendectomy  cholecystectomy        PLAN    cont cont precautions  covid 5/14/21 neg noted above  d/c remdesevir given covid ab positive noted   completed dexamethasone   started pulse steroids for 3 days - 250mg solumedrol bid now tapered off 4/14/21   C/w prednisone 20 daily    cont on bactrim empirically, TIW   cont asa, vit c,    cont albuterol inhaler   pulm f/u  procalcitonin, D-dimer, crp, ldh, ferritin, lactate noted ,    cont tylenol prn,   cont robitussin prn   pt off precedex    pt on methadone   pain mgmt eval noted   cont phenylephrine drip for hypotension  clonidine held 2nd to low bp  s/p intubation 3/29/21   s/p tracheostomy 4/21/21  O2 sat 100% (94% - 100%) mv 40%  O2 via mech vent and taper fio2 as tolerated   vent mgmt as per icu  xray 3/19/21 with pneumomediastinum  rept cxr with New trace right apical pneumothorax. New mild left apical pneumothorax. Grossly stable small pneumomediastinum.  Soft tissue emphysema at the neck bases bilaterally. Grossly stable bilateral pulmonary infiltrates noted.   cxr 2/24 with No evidence of pneumothorax can be appreciated on the available image. This may be related to patient positioning. Evidence of pneumomediastinum and subcutaneous emphysema in the lower neck is again   noted. There are patchy bibasilar infiltrates and elevated right hemidiaphragm noted.   cxr 3/29/21 with No significant change bilateral infiltrates. There is a small simple left apical pneumothorax. No significant pleural effusion. Bilateral subcutaneous emphysema similar to prior.   XRs on 7AM and 5PM with no obvious increase of ptx  cxr 4/4/21 with Improving bilateral airspace disease noted    cxr 4/8/21 with Small left pneumothorax noted.  thoracic surg f/u   s/p L chest tube replacement 4/18/21 for recurrence of left pneumothorax   cxr 4/20/21 with Unchanged advanced infiltrates and catheter left chest tube noted   CXR 4/11/21 with : No interval change compared to one day prior. No pneumothorax noted.   unable to flush or aspirate tube fully, noted debris in tubing which was milked out.   Once material removed from tubing able to aspirated and flush fully. Also changed dressing on pigtail which appears to be in good position.   Monitor O2 status   s/p tracheostomy 4/21/21   cxr 4/21/21 with No evidence of active chest disease. Tracheostomy tube in place otherwise no significant change noted   cxr 4/25/21 with A 40% LEFT pneumothorax. LEFT multi-sidehole pigtail catheter overlies LEFT lower hemithorax.. Bilateral multifocal and diffuse ill-defined airspace opacities..  Follow-up AP portable chest radiograph 4/25/2021 AT 8:58 AM: Residual LEFT 30% upper zone pneumothorax. Otherwise no interval change.noted    cxr 4/30/21 Tracheostomy tube again noted. Left chest tube noted with small left apical pneumothorax unchanged. Bilateral airspace opacities overall worsening. Heart size cannot be accurately assessed in this projection noted.   cxr 5/3/21 with Large left pneumothorax noted above.   cxr 5 4/21 with No significant interval change noted above.   ct chest with Extensive chronic appearing consolidative opacities throughout the lungs, most prominent in the left lower lobe and lingula, with bronchiectasis, scarring, and volume loss. Additional scattered peripheral coarse opacities.   Mild left pneumothorax. Left lateral pleural space pigtail catheter, not in continuity with the pneumothorax. Mild bilateral hydronephrosis, similar to appearance on CT of the abdomen and pelvis on April 27. noted above   s/p 2nd chest tube 5/5/21  cxr 5/6/21 with Tracheostomy and catheter left chest tubes remain. , There are significant diffuse advanced infiltrates again noted. No pneumothorax. Above findings are similar to study earlier in the day. Present film shows a left jugular line inserted   with tip entering the superior vena cava noted   cxr 5/11/21 with Heart magnified by technique. Tracheostomy, left jugular line, and 2 catheter left chest tubes remain. Quite advanced infiltration in the lungs particularly in the left lower lobe again noted.  There is a persistent rather small left apical pneumothorax. Chest is similar to May 10 noted    cxt 5/14/21 with Perihilar diffuse airspace disease and LEFT lower lobe retrocardiac airspace consolidation. LEFT chest tube overlies upper zone. Small LEFT apical less than 5% pneumothorax noted   cxr 5/17/21 with Similar infiltrates. Small left pneumothorax similar to the prior study noted.   cxr 5/19/21 with Persistent mild to moderate left pneumothorax despite chest tube noted   cxr 5/20 with Improved left pneumothorax. Significant infiltration particularly in the left lower lobe again noted   cxr 5/21/21 with No appreciable pneumothorax at this time. Persistent advanced infiltrates noted    cxr 5/22/21 with  Left chest pigtail. Status post tracheostomy. Heart size unremarkable. No pneumothorax. Multiple patchy opacities throughout the left lung. Status post gastrostomy. No dilated loops of bowel noted above.  s/p surgical placement of gastrostomy tube 4/23/2021.   abd xray 5/10/21 with ileus noted   dressing changed daily for seroma   abd xray 5/21/21 with Decreased bowel ileus compared to prior 5/20/2021 exam noted above.   abd xray 5/22/21 with No dilated loops of bowel noted above.  cont tube feeding   CT scan of the chest 5/13/21 demonstrates diffuse bilateral infiltrates with a region of consolidation at the left lung base. Small left pneumothorax. 2 left chest tubes noted in place noted above.  CT scan of the abdomen and pelvis 5/13/21 demonstrates diffuse dilatation of small and large bowel loops most consistent with ileus noted   xray abd with  5/14/21 with Ingested contrast within nonobstructed large bowel to the level of rectum. No acute radiographic intra-abdominal findings noted above.  id f/u   patient completed course of Meropenem  leukocytosis resolved  sputum cx with Enterobacter aerogenes (Carbapenem Resistant) noted above   tmx 99   ua positive    andrea changed  Completed  meropenem, s/p 1 dose of Vancomycin   completed zosyn  lispro ss   prognosis poor   s/p 4 units prbc for anemia  f/u h/h    transfuse prbc as needed  heme onc f/u  retic is elevated.    Haptoglobin normal  ? daily phlebotomy  no hemolysis, Fe/B12/folate adequate  hemolysis is unlikely even his baseline haptoglobin may be very elevated due to COVID  direct pamella neg noted    psych f/u  Reduce dose of Klonopin to 1 mg q8h standing (no PRNs), with plan to further taper as tolerated  Continue delirium precautions: Frequent reorientation, familiar pictures and objects at bedside, natural light in daytime,   consistent sleep/wake schedule, adequate hydration and nutrition, sensory aids (hearing aids, glasses) present if needed, minimizing noise and overstimulation,   clustering care to minimize overnight interruptions, judicious use of deliriogenic medications (anticholinergics, benzodiazepines and opioid analgesics), minimize use of restraints  cont current meds   mgmt as per icu         Patient is a 55y old  Male who presents with a chief complaint of SOB (23 May 2021 12:03)    pt seen in icu [ x ], reg med floor [   ], bed [ x ], chair at bedside [   ], awake and responsive [  ], mildly sedated, [ x ],    nad [x  ]        Allergies    No Known Allergies        Vitals    T(F): 97.8 (05-24-21 @ 04:00), Max: 98.9 (05-23-21 @ 08:00)  HR: 72 (05-24-21 @ 06:00) (57 - 107)  BP: 141/80 (05-24-21 @ 06:00) (111/58 - 145/68)  RR: 22 (05-24-21 @ 06:00) (15 - 26)  SpO2: 100% (05-24-21 @ 06:00) (95% - 100%)  Wt(kg): --  CAPILLARY BLOOD GLUCOSE      POCT Blood Glucose.: 163 mg/dL (24 May 2021 05:49)      Labs                          8.9    8.43  )-----------( 223      ( 24 May 2021 03:46 )             27.1       05-24    141  |  105  |  18  ----------------------------<  71  3.3<L>   |  32<H>  |  1.01    Ca    8.7      24 May 2021 03:46  Phos  2.7     05-24  Mg     2.0     05-24    TPro  6.2  /  Alb  2.9<L>  /  TBili  1.1  /  DBili  x   /  AST  26  /  ALT  33  /  AlkPhos  71  05-24            .Sputum Sputum  04-16 @ 04:42   Moderate Enterobacter aerogenes (Carbapenem Resistant)  Normal Respiratory Monserrat present  --  Enterobacter aerogenes (Carbapenem Resistant)      .Urine Clean Catch (Midstream)  03-15 @ 00:52   No growth  --  --          Radiology Results    < from: Xray Chest 1 View- PORTABLE-Urgent (Xray Chest 1 View- PORTABLE-Urgent .) (05.23.21 @ 12:21) >  IMPRESSION:    Tracheostomy tube and within the left pigtail catheter are unchanged.    Stable hazy opacities, left greater than the right. No pneumothorax.    < end of copied text >        Meds    MEDICATIONS  (STANDING):  ascorbic acid 1000 milliGRAM(s) Oral daily  BACItracin   Ointment 1 Application(s) Topical two times a day  bisacodyl Suppository 10 milliGRAM(s) Rectal daily  chlorhexidine 2% Cloths 1 Application(s) Topical <User Schedule>  clonazePAM  Tablet 1 milliGRAM(s) Oral every 8 hours  dextrose 5% + sodium chloride 0.9%. 1000 milliLiter(s) (50 mL/Hr) IV Continuous <Continuous>  enoxaparin Injectable 40 milliGRAM(s) SubCutaneous every 24 hours  gabapentin 100 milliGRAM(s) Oral every 8 hours  insulin lispro (ADMELOG) corrective regimen sliding scale   SubCutaneous every 6 hours  lactulose Syrup 10 Gram(s) Oral daily  methadone    Tablet 10 milliGRAM(s) Oral <User Schedule>  methadone    Tablet 5 milliGRAM(s) Oral <User Schedule>  metoclopramide Injectable 10 milliGRAM(s) IV Push three times a day  midodrine 10 milliGRAM(s) Oral every 8 hours  naloxegol 25 milliGRAM(s) Oral daily  pantoprazole   Suspension 40 milliGRAM(s) Oral daily  potassium chloride   Powder 40 milliEquivalent(s) Oral every 4 hours  predniSONE   Tablet 20 milliGRAM(s) Oral daily  QUEtiapine 75 milliGRAM(s) Oral two times a day  trimethoprim  40 mG/sulfamethoxazole 200 mG Suspension 160 milliGRAM(s) Oral <User Schedule>      MEDICATIONS  (PRN):  acetaminophen    Suspension .. 650 milliGRAM(s) Oral every 12 hours PRN Temp greater or equal to 38C (100.4F)  ALBUTerol    90 MICROgram(s) HFA Inhaler 2 Puff(s) Inhalation every 6 hours PRN Shortness of Breath and/or Wheezing  artificial  tears Solution 1 Drop(s) Both EYES every 4 hours PRN Dry Eyes  sodium chloride 0.9% lock flush 10 milliLiter(s) IV Push every 1 hour PRN Pre/post blood products, medications, blood draw, and to maintain line patency      Physical Exam    Neuro :  no focal deficits  Respiratory: CTA B/L  CV: RRR, S1S2, no murmurs,   Abdominal: Soft, NT, ND +BS, gastrostomy tube inplace  Extremities: edema of extrem, + peripheral pulses      ASSESSMENT      Hypoxemia 2nd to covid pna   transaminitis  prediabetes  h/o appendectomy  cholecystectomy        PLAN    cont cont precautions  covid 5/14/21 neg noted above  d/c remdesevir given covid ab positive noted   completed dexamethasone   started pulse steroids for 3 days - 250mg solumedrol bid now tapered off 4/14/21   C/w prednisone 20 daily    cont on bactrim empirically, TIW   cont asa, vit c,    cont albuterol inhaler   pulm f/u  procalcitonin, D-dimer, crp, ldh, ferritin, lactate noted ,    cont tylenol prn,   cont robitussin prn   pt off precedex    pt on methadone   pain mgmt eval noted   cont phenylephrine drip for hypotension  clonidine held 2nd to low bp  s/p intubation 3/29/21   s/p tracheostomy 4/21/21  O2 sat 100% (95% - 100%)mv 40%  O2 via mech vent and taper fio2 as tolerated   vent mgmt as per icu  xray 3/19/21 with pneumomediastinum  rept cxr with New trace right apical pneumothorax. New mild left apical pneumothorax. Grossly stable small pneumomediastinum.  Soft tissue emphysema at the neck bases bilaterally. Grossly stable bilateral pulmonary infiltrates noted.   cxr 2/24 with No evidence of pneumothorax can be appreciated on the available image. This may be related to patient positioning. Evidence of pneumomediastinum and subcutaneous emphysema in the lower neck is again   noted. There are patchy bibasilar infiltrates and elevated right hemidiaphragm noted.   cxr 3/29/21 with No significant change bilateral infiltrates. There is a small simple left apical pneumothorax. No significant pleural effusion. Bilateral subcutaneous emphysema similar to prior.   XRs on 7AM and 5PM with no obvious increase of ptx  cxr 4/4/21 with Improving bilateral airspace disease noted    cxr 4/8/21 with Small left pneumothorax noted.  thoracic surg f/u   s/p L chest tube replacement 4/18/21 for recurrence of left pneumothorax   cxr 4/20/21 with Unchanged advanced infiltrates and catheter left chest tube noted   CXR 4/11/21 with : No interval change compared to one day prior. No pneumothorax noted.   unable to flush or aspirate tube fully, noted debris in tubing which was milked out.   Once material removed from tubing able to aspirated and flush fully. Also changed dressing on pigtail which appears to be in good position.   Monitor O2 status   s/p tracheostomy 4/21/21   cxr 4/21/21 with No evidence of active chest disease. Tracheostomy tube in place otherwise no significant change noted   cxr 4/25/21 with A 40% LEFT pneumothorax. LEFT multi-sidehole pigtail catheter overlies LEFT lower hemithorax.. Bilateral multifocal and diffuse ill-defined airspace opacities..  Follow-up AP portable chest radiograph 4/25/2021 AT 8:58 AM: Residual LEFT 30% upper zone pneumothorax. Otherwise no interval change.noted    cxr 4/30/21 Tracheostomy tube again noted. Left chest tube noted with small left apical pneumothorax unchanged. Bilateral airspace opacities overall worsening. Heart size cannot be accurately assessed in this projection noted.   cxr 5/3/21 with Large left pneumothorax noted above.   cxr 5 4/21 with No significant interval change noted above.   ct chest with Extensive chronic appearing consolidative opacities throughout the lungs, most prominent in the left lower lobe and lingula, with bronchiectasis, scarring, and volume loss. Additional scattered peripheral coarse opacities.   Mild left pneumothorax. Left lateral pleural space pigtail catheter, not in continuity with the pneumothorax. Mild bilateral hydronephrosis, similar to appearance on CT of the abdomen and pelvis on April 27. noted above   s/p 2nd chest tube 5/5/21  cxr 5/6/21 with Tracheostomy and catheter left chest tubes remain. , There are significant diffuse advanced infiltrates again noted. No pneumothorax. Above findings are similar to study earlier in the day. Present film shows a left jugular line inserted   with tip entering the superior vena cava noted   cxr 5/11/21 with Heart magnified by technique. Tracheostomy, left jugular line, and 2 catheter left chest tubes remain. Quite advanced infiltration in the lungs particularly in the left lower lobe again noted.  There is a persistent rather small left apical pneumothorax. Chest is similar to May 10 noted    cxt 5/14/21 with Perihilar diffuse airspace disease and LEFT lower lobe retrocardiac airspace consolidation. LEFT chest tube overlies upper zone. Small LEFT apical less than 5% pneumothorax noted   cxr 5/17/21 with Similar infiltrates. Small left pneumothorax similar to the prior study noted.   cxr 5/19/21 with Persistent mild to moderate left pneumothorax despite chest tube noted   cxr 5/20 with Improved left pneumothorax. Significant infiltration particularly in the left lower lobe again noted   cxr 5/21/21 with No appreciable pneumothorax at this time. Persistent advanced infiltrates noted    cxr 5/22/21 with  Left chest pigtail. Status post tracheostomy. Heart size unremarkable. No pneumothorax. Multiple patchy opacities throughout the left lung. Status post gastrostomy. No dilated loops of bowel noted above.   cxr 5/23/21 with Tracheostomy tube and within the left pigtail catheter are unchanged. Stable hazy opacities, left greater than the right. No pneumothorax noted above.  s/p surgical placement of gastrostomy tube 4/23/2021.   abd xray 5/10/21 with ileus noted   dressing changed daily for seroma   abd xray 5/21/21 with Decreased bowel ileus compared to prior 5/20/2021 exam noted above.   abd xray 5/22/21 with No dilated loops of bowel noted above.  cont tube feeding   CT scan of the chest 5/13/21 demonstrates diffuse bilateral infiltrates with a region of consolidation at the left lung base. Small left pneumothorax. 2 left chest tubes noted in place noted above.  CT scan of the abdomen and pelvis 5/13/21 demonstrates diffuse dilatation of small and large bowel loops most consistent with ileus noted   xray abd with  5/14/21 with Ingested contrast within nonobstructed large bowel to the level of rectum. No acute radiographic intra-abdominal findings noted above.  id f/u   patient completed course of Meropenem  leukocytosis resolved  sputum cx with Enterobacter aerogenes (Carbapenem Resistant) noted above   tmx 98.9   ua positive    andrea changed  Completed  meropenem, s/p 1 dose of Vancomycin   completed zosyn  lispro ss   prognosis poor   s/p 4 units prbc for anemia  f/u h/h    transfuse prbc as needed  heme onc f/u  retic is elevated.    Haptoglobin normal  ? daily phlebotomy  no hemolysis, Fe/B12/folate adequate  hemolysis is unlikely even his baseline haptoglobin may be very elevated due to COVID  direct pamella neg noted    psych f/u  Reduce dose of Klonopin to 1 mg q8h standing (no PRNs), with plan to further taper as tolerated  Continue delirium precautions: Frequent reorientation, familiar pictures and objects at bedside, natural light in daytime,   consistent sleep/wake schedule, adequate hydration and nutrition, sensory aids (hearing aids, glasses) present if needed, minimizing noise and overstimulation,   clustering care to minimize overnight interruptions, judicious use of deliriogenic medications (anticholinergics, benzodiazepines and opioid analgesics), minimize use of restraints  cont current meds   mgmt as per icu

## 2021-05-24 NOTE — PROGRESS NOTE ADULT - SUBJECTIVE AND OBJECTIVE BOX
INTERVAL HPI/OVERNIGHT EVENTS: CT was placed back to suction. Will start on trickling feeds. Was placed oN sbt    PRESSORS: [ ] YES [ ] NO  WHICH:    ANTIBIOTICS:                      Antimicrobial:  trimethoprim  40 mG/sulfamethoxazole 200 mG Suspension 160 milliGRAM(s) Oral <User Schedule>    Cardiovascular:  midodrine 5 milliGRAM(s) Oral every 8 hours    Pulmonary:  ALBUTerol    90 MICROgram(s) HFA Inhaler 2 Puff(s) Inhalation every 6 hours PRN    Hematalogic:  enoxaparin Injectable 40 milliGRAM(s) SubCutaneous every 24 hours    Other:  acetaminophen    Suspension .. 650 milliGRAM(s) Oral every 12 hours PRN  artificial  tears Solution 1 Drop(s) Both EYES every 4 hours PRN  ascorbic acid 1000 milliGRAM(s) Oral daily  BACItracin   Ointment 1 Application(s) Topical two times a day  bisacodyl Suppository 10 milliGRAM(s) Rectal daily  chlorhexidine 2% Cloths 1 Application(s) Topical <User Schedule>  clonazePAM  Tablet 1 milliGRAM(s) Oral every 8 hours  gabapentin 100 milliGRAM(s) Oral every 8 hours  insulin lispro (ADMELOG) corrective regimen sliding scale   SubCutaneous every 6 hours  lactulose Syrup 10 Gram(s) Oral daily  methadone    Tablet 5 milliGRAM(s) Oral every 8 hours  naloxegol 25 milliGRAM(s) Oral daily  pantoprazole   Suspension 40 milliGRAM(s) Oral daily  potassium chloride   Powder 40 milliEquivalent(s) Oral every 4 hours  predniSONE   Tablet 20 milliGRAM(s) Oral daily  QUEtiapine 75 milliGRAM(s) Oral two times a day  sodium chloride 0.9% lock flush 10 milliLiter(s) IV Push every 1 hour PRN    acetaminophen    Suspension .. 650 milliGRAM(s) Oral every 12 hours PRN  ALBUTerol    90 MICROgram(s) HFA Inhaler 2 Puff(s) Inhalation every 6 hours PRN  artificial  tears Solution 1 Drop(s) Both EYES every 4 hours PRN  ascorbic acid 1000 milliGRAM(s) Oral daily  BACItracin   Ointment 1 Application(s) Topical two times a day  bisacodyl Suppository 10 milliGRAM(s) Rectal daily  chlorhexidine 2% Cloths 1 Application(s) Topical <User Schedule>  clonazePAM  Tablet 1 milliGRAM(s) Oral every 8 hours  enoxaparin Injectable 40 milliGRAM(s) SubCutaneous every 24 hours  gabapentin 100 milliGRAM(s) Oral every 8 hours  insulin lispro (ADMELOG) corrective regimen sliding scale   SubCutaneous every 6 hours  lactulose Syrup 10 Gram(s) Oral daily  methadone    Tablet 5 milliGRAM(s) Oral every 8 hours  midodrine 5 milliGRAM(s) Oral every 8 hours  naloxegol 25 milliGRAM(s) Oral daily  pantoprazole   Suspension 40 milliGRAM(s) Oral daily  potassium chloride   Powder 40 milliEquivalent(s) Oral every 4 hours  predniSONE   Tablet 20 milliGRAM(s) Oral daily  QUEtiapine 75 milliGRAM(s) Oral two times a day  sodium chloride 0.9% lock flush 10 milliLiter(s) IV Push every 1 hour PRN  trimethoprim  40 mG/sulfamethoxazole 200 mG Suspension 160 milliGRAM(s) Oral <User Schedule>    Drug Dosing Weight  Height (cm): 167.6 (23 Apr 2021 23:15)  Weight (kg): 83.9 (23 Apr 2021 23:15)  BMI (kg/m2): 29.9 (23 Apr 2021 23:15)  BSA (m2): 1.93 (23 Apr 2021 23:15)    CENTRAL LINE: [ ] YES [ ] NO  LOCATION:   DATE INSERTED:  REMOVE: [ ] YES [ ] NO  EXPLAIN:    RIVERA: [ ] YES [ ] NO    DATE INSERTED:  REMOVE:  [ ] YES [ ] NO  EXPLAIN:    A-LINE:  [ ] YES [ ] NO  LOCATION:   DATE INSERTED:  REMOVE:  [ ] YES [ ] NO  EXPLAIN:    PMH -reviewed admission note, no change since admission    ICU Vital Signs Last 24 Hrs  T(C): 36.6 (24 May 2021 04:00), Max: 37.1 (23 May 2021 16:13)  T(F): 97.8 (24 May 2021 04:00), Max: 98.8 (23 May 2021 16:13)  HR: 78 (24 May 2021 09:00) (57 - 107)  BP: 150/80 (24 May 2021 09:00) (111/58 - 157/86)  BP(mean): 94 (24 May 2021 09:00) (66 - 103)  ABP: --  ABP(mean): --  RR: 17 (24 May 2021 09:00) (15 - 26)  SpO2: 100% (24 May 2021 09:00) (95% - 100%)      ABG - ( 24 May 2021 10:22 )  pH, Arterial: 7.31  pH, Blood: x     /  pCO2: 66    /  pO2: 95    / HCO3: 33    / Base Excess: 4.8   /  SaO2: 99                    05-23 @ 07:01  -  05-24 @ 07:00  --------------------------------------------------------  IN: 1320 mL / OUT: 1755 mL / NET: -435 mL        Mode: PS (Pressure Support)/ Spontaneous  FiO2: 40  PEEP: 5  PS: 8  ITime: 1  MAP: 7  PIP: 14      PHYSICAL EXAM:  GENERAL: patient seen resting in bed and in no apparent distress   HEAD: Atraumatic, Normocephalic  EYES: PERRLA, conjunctiva and sclera clear  ENMT: Moist mucous membranes, Good dentition, No lesions  NECK: Supple, normal appearance   NERVOUS SYSTEM:  Alert & Oriented, Good concentration   CHEST/LUNG: No chest deformity; crackles present to auscultation    HEART: Regular rate and rhythm; No murmurs   ABDOMEN: Soft, Nontender, Nondistended; Bowel sounds present  EXTREMITIES: No clubbing, cyanosis, or edema  LYMPH: No lymphadenopathy noted  SKIN: No rashes or lesions; Good capillary refill    LABS:  CBC Full  -  ( 24 May 2021 03:46 )  WBC Count : 8.43 K/uL  RBC Count : 2.61 M/uL  Hemoglobin : 8.9 g/dL  Hematocrit : 27.1 %  Platelet Count - Automated : 223 K/uL  Mean Cell Volume : 103.8 fl  Mean Cell Hemoglobin : 34.1 pg  Mean Cell Hemoglobin Concentration : 32.8 gm/dL  Auto Neutrophil # : x  Auto Lymphocyte # : x  Auto Monocyte # : x  Auto Eosinophil # : x  Auto Basophil # : x  Auto Neutrophil % : x  Auto Lymphocyte % : x  Auto Monocyte % : x  Auto Eosinophil % : x  Auto Basophil % : x    05-24    141  |  105  |  18  ----------------------------<  71  3.3<L>   |  32<H>  |  1.01    Ca    8.7      24 May 2021 03:46  Phos  2.7     05-24  Mg     2.0     05-24    TPro  6.2  /  Alb  2.9<L>  /  TBili  1.1  /  DBili  x   /  AST  26  /  ALT  33  /  AlkPhos  71  05-24            RADIOLOGY & ADDITIONAL STUDIES REVIEWED:  ***    GOALS OF CARE DISCUSSION WITH PATIENT/FAMILY/PROXY:    CRITICAL CARE TIME SPENT: 35 minutes

## 2021-05-24 NOTE — PROGRESS NOTE ADULT - SUBJECTIVE AND OBJECTIVE BOX
Patient is a 55y old  Male who presents with a chief complaint of SOB (24 May 2021 10:16)  Patient is awake, alert, comfortable in bed in NAD    INTERVAL HPI/OVERNIGHT EVENTS:      VITAL SIGNS:  T(F): 97.8 (05-24-21 @ 04:00)  HR: 78 (05-24-21 @ 09:00)  BP: 150/80 (05-24-21 @ 09:00)  RR: 17 (05-24-21 @ 09:00)  SpO2: 100% (05-24-21 @ 09:00)  Wt(kg): --  I&O's Detail    23 May 2021 07:01  -  24 May 2021 07:00  --------------------------------------------------------  IN:    dextrose 5% + sodium chloride 0.9%: 50 mL    Enteral Tube Flush: 170 mL    sodium chloride 0.9%: 600 mL    sodium chloride 0.9%: 500 mL  Total IN: 1320 mL    OUT:    Chest Tube (mL): 40 mL    Indwelling Catheter - Urethral (mL): 1715 mL  Total OUT: 1755 mL    Total NET: -435 mL        Mode: PS (Pressure Support)/ Spontaneous  FiO2: 40  PEEP: 5  PS: 8  ITime: 1  MAP: 7  PIP: 14        REVIEW OF SYSTEMS:    CONSTITUTIONAL:  No fevers, chills, sweats    HEENT:  Eyes:  No diplopia or blurred vision. ENT:  No earache, sore throat or runny nose.    CARDIOVASCULAR:  No pressure, squeezing, tightness, or heaviness about the chest; no palpitations.    RESPIRATORY:  Per HPI    GASTROINTESTINAL:  No abdominal pain, nausea, vomiting or diarrhea.    GENITOURINARY:  No dysuria, frequency or urgency.    NEUROLOGIC:  No paresthesias, fasciculations, seizures or weakness.    PSYCHIATRIC:  No disorder of thought or mood.      PHYSICAL EXAM:    Constitutional: Well developed and nourished  Eyes:Perrla  ENMT: normal  Neck:supple  Respiratory: good air entry  Cardiovascular: S1 S2 regular  Gastrointestinal: Soft, Non tender  Extremities: No edema  Vascular:normal  Neurological:Awake, alert,Ox3  Musculoskeletal:Normal      MEDICATIONS  (STANDING):  ascorbic acid 1000 milliGRAM(s) Oral daily  BACItracin   Ointment 1 Application(s) Topical two times a day  bisacodyl Suppository 10 milliGRAM(s) Rectal daily  chlorhexidine 2% Cloths 1 Application(s) Topical <User Schedule>  clonazePAM  Tablet 1 milliGRAM(s) Oral every 8 hours  enoxaparin Injectable 40 milliGRAM(s) SubCutaneous every 24 hours  gabapentin 100 milliGRAM(s) Oral every 8 hours  insulin lispro (ADMELOG) corrective regimen sliding scale   SubCutaneous every 6 hours  lactulose Syrup 10 Gram(s) Oral daily  methadone    Tablet 5 milliGRAM(s) Oral every 8 hours  midodrine 5 milliGRAM(s) Oral every 8 hours  naloxegol 25 milliGRAM(s) Oral daily  pantoprazole   Suspension 40 milliGRAM(s) Oral daily  potassium chloride   Powder 40 milliEquivalent(s) Oral every 4 hours  predniSONE   Tablet 20 milliGRAM(s) Oral daily  QUEtiapine 50 milliGRAM(s) Oral two times a day  trimethoprim  40 mG/sulfamethoxazole 200 mG Suspension 160 milliGRAM(s) Oral <User Schedule>    MEDICATIONS  (PRN):  acetaminophen    Suspension .. 650 milliGRAM(s) Oral every 12 hours PRN Temp greater or equal to 38C (100.4F)  ALBUTerol    90 MICROgram(s) HFA Inhaler 2 Puff(s) Inhalation every 6 hours PRN Shortness of Breath and/or Wheezing  artificial  tears Solution 1 Drop(s) Both EYES every 4 hours PRN Dry Eyes  sodium chloride 0.9% lock flush 10 milliLiter(s) IV Push every 1 hour PRN Pre/post blood products, medications, blood draw, and to maintain line patency      Allergies    No Known Allergies    Intolerances        LABS:                        8.9    8.43  )-----------( 223      ( 24 May 2021 03:46 )             27.1     05-24    141  |  105  |  18  ----------------------------<  71  3.3<L>   |  32<H>  |  1.01    Ca    8.7      24 May 2021 03:46  Phos  2.7     05-24  Mg     2.0     05-24    TPro  6.2  /  Alb  2.9<L>  /  TBili  1.1  /  DBili  x   /  AST  26  /  ALT  33  /  AlkPhos  71  05-24        ABG - ( 24 May 2021 10:22 )  pH, Arterial: 7.31  pH, Blood: x     /  pCO2: 66    /  pO2: 95    / HCO3: 33    / Base Excess: 4.8   /  SaO2: 99                    CAPILLARY BLOOD GLUCOSE      POCT Blood Glucose.: 163 mg/dL (24 May 2021 05:49)  POCT Blood Glucose.: 78 mg/dL (24 May 2021 05:15)  POCT Blood Glucose.: 172 mg/dL (23 May 2021 23:21)  POCT Blood Glucose.: 72 mg/dL (23 May 2021 22:51)  POCT Blood Glucose.: 163 mg/dL (23 May 2021 17:50)  POCT Blood Glucose.: 79 mg/dL (23 May 2021 16:55)  POCT Blood Glucose.: 97 mg/dL (23 May 2021 11:35)        RADIOLOGY & ADDITIONAL TESTS:    CXR:  < from: Xray Chest 1 View- PORTABLE-Urgent (Xray Chest 1 View- PORTABLE-Urgent .) (05.23.21 @ 12:21) >  FINDINGS/  IMPRESSION:    Tracheostomy tube and within the left pigtail catheter are unchanged.    Stable hazy opacities, left greater than the right. No pneumothorax.      < end of copied text >    Ct scan chest:    ekg;    echo:

## 2021-05-24 NOTE — CHART NOTE - NSCHARTNOTEFT_GEN_A_CORE
Assessment:   Patient is a 55y old  Male who presents with a chief complaint of SOB (24 May 2021 10:53). Order for trickle feeding placed in AM (S/p NPO ~4 days due to ileus). Seen in AM, TF had not been started, yet. Discussed with RN.        Diet Prescription: Diet, NPO with Tube Feed:   Tube Feeding Modality: Nasogastric  Jevity 1.5 Leonardo  Total Volume for 24 Hours (mL): 240  Continuous  Starting Tube Feed Rate {mL per Hour}: 5  Increase Tube Feed Rate by (mL): 5     Every hour  Until Goal Tube Feed Rate (mL per Hour): 10  Tube Feed Duration (in Hours): 24  Tube Feed Start Time: 10:00 (21 @ 09:27)        Daily     Daily Weight in k.3 (24 May 2021 07:00)  Weight in k.6 (23 May 2021 08:00)  Weight in k.9 (22 May 2021 08:00)  Weight in k.4 (21 May 2021 08:00)  Weight in k.6 (20 May 2021 07:00)  Weight in k.8 (18 May 2021 07:00)  Weight in k.1 (17 May 2021 07:00)  Weight in k.7 (16 May 2021 07:00)  Weight in k.5 (15 May 2021 08:00)  Weight in k.4 (13 May 2021 07:30)  Weight in k.1 (12 May 2021 07:00)  Weight in k.8 (11 May 2021 07:00)        Pertinent Medications: MEDICATIONS  (STANDING):  ascorbic acid 1000 milliGRAM(s) Oral daily  BACItracin   Ointment 1 Application(s) Topical two times a day  bisacodyl Suppository 10 milliGRAM(s) Rectal daily  chlorhexidine 2% Cloths 1 Application(s) Topical <User Schedule>  clonazePAM  Tablet 1 milliGRAM(s) Oral every 8 hours  enoxaparin Injectable 40 milliGRAM(s) SubCutaneous every 24 hours  gabapentin 100 milliGRAM(s) Oral every 8 hours  insulin lispro (ADMELOG) corrective regimen sliding scale   SubCutaneous every 6 hours  lactulose Syrup 10 Gram(s) Oral daily  methadone    Tablet 5 milliGRAM(s) Oral every 8 hours  midodrine 5 milliGRAM(s) Oral every 8 hours  naloxegol 25 milliGRAM(s) Oral daily  pantoprazole   Suspension 40 milliGRAM(s) Oral daily  predniSONE   Tablet 20 milliGRAM(s) Oral daily  QUEtiapine 50 milliGRAM(s) Oral two times a day  trimethoprim  40 mG/sulfamethoxazole 200 mG Suspension 160 milliGRAM(s) Oral <User Schedule>    MEDICATIONS  (PRN):  acetaminophen    Suspension .. 650 milliGRAM(s) Oral every 12 hours PRN Temp greater or equal to 38C (100.4F)  ALBUTerol    90 MICROgram(s) HFA Inhaler 2 Puff(s) Inhalation every 6 hours PRN Shortness of Breath and/or Wheezing  artificial  tears Solution 1 Drop(s) Both EYES every 4 hours PRN Dry Eyes  sodium chloride 0.9% lock flush 10 milliLiter(s) IV Push every 1 hour PRN Pre/post blood products, medications, blood draw, and to maintain line patency    Pertinent Labs:  Na141 mmol/L Glu 71 mg/dL K+ 3.3 mmol/L<L> Cr  1.01 mg/dL BUN 18 mg/dL  Phos 2.7 mg/dL  Alb 2.9 g/dL<L> - Chol 165 mg/dL LDL --    HDL 26 mg/dL<L> Trig 414 mg/dL<H>     CAPILLARY BLOOD GLUCOSE      POCT Blood Glucose.: 133 mg/dL (24 May 2021 16:03)  POCT Blood Glucose.: 129 mg/dL (24 May 2021 11:20)  POCT Blood Glucose.: 163 mg/dL (24 May 2021 05:49)  POCT Blood Glucose.: 78 mg/dL (24 May 2021 05:15)  POCT Blood Glucose.: 172 mg/dL (23 May 2021 23:21)  POCT Blood Glucose.: 72 mg/dL (23 May 2021 22:51)  POCT Blood Glucose.: 163 mg/dL (23 May 2021 17:50)      Previous Nutrition Diagnosis:   [ ] Altered GI function  [ ]Inadequate Oral Intake [ ] Swallowing Difficulty   [ ] Altered nutrition related labs [ ] Increased Nutrient Needs [ ] Overweight/Obesity   [ ] Unintended Weight Loss [ ] Food & Nutrition Related Knowledge Deficit [x ] Malnutrition (severe PCM)  [ ] Other:     Nutrition Diagnosis is [x ] ongoing  [ ] resolved [ ] not applicable     Interventions:   Recommend  [ ] Change Diet To:  [ ] Nutrition Supplement  [x ] Nutrition Support: See TF order/ recommendation  , as medically feasible.   [x ] Other: MD to monitor. RD available.     Monitoring and Evaluation:    [ x ] Tolerance to diet prescription [ x ] weights [ x ] labs[ x ] follow up per protocol  [ ] other:

## 2021-05-24 NOTE — BH CONSULTATION LIAISON PROGRESS NOTE - NSBHASSESSMENTFT_PSY_ALL_CORE
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 Psych consulted for med management due to difficulty weaning off sedation. On initial exam, pt is highly somnolent and unable to participate, but hx and current presentation appear highly c/w acute multifactorial delirium i/s/o AHRF, BZD withdrawal and opioid withdrawal. To better manage delirium with less sedation, we agree with current Klonopin dosing of 1 mg q8h standing with plan to further taper in future as tolerated. As pt appears to be tolerating Klonopin and methadone tapers well, we now recommend tapering Seroquel to 50 mg BID starting today. Pt disposition will depend on clinical course, but there is currently no reason to believe that pt will require admission to  psychiatry.

## 2021-05-24 NOTE — PROGRESS NOTE ADULT - ATTENDING COMMENTS
55 yr old  man , non smoker with  moody 1990s presented 3/14 with x9 days worsening cough, subjective fevers, and SOB, with x2-3 days dysuria and central, non-radiating, constant CP. Admitted to medicine unit  for acute hypoxic respiratory failure secondary to pna from covid-19 infection .     Assessment:  1. Acute hypoxic respiratory failure  2. Covid-19 infection   3. Transaminitis  4. Prediabetes  5. Bilateral pneumothorax  6. Septic shock - resolved  7. Anemia  8. bowel obstruction/ileus    Plan   -Better controlled anxiety  -Taper methadone as tolerated, decrease to 5mg TID  -left apical PTX, recurrent after chest tube placed on water seal, will place back to suction for now  -S/p tracheostomy placement 4/21   -S/p G-tube 4/23  -Cont. mechanical ventilation   -Daily weaning trials, tolerating SBT with PS 8  -Repeat ABG with some CO2 retention on CPAP  -Taper midodrine  -PT evaluation, upright positioning as tolerated  -On antibiotics again will complete 5 days   -Tapering dose of steroids as ordered, bactrim for PCP prophylaxis   -C-diff PCR negative  -Cont. movantik, d/c metoclopramide   -Start tube feedings at low rate and monitor response as abdominal distension is improving  -IV fluid hydration while he is NPO  -dvt/gi prophy  -hemodynamic monitoring   -Prognosis is guarded.

## 2021-05-24 NOTE — PROGRESS NOTE ADULT - ASSESSMENT
Assessment and plan: 56 y/o M with no PMH is admitted to ICU for AHRF 2/2 covid19 pna     1. Acute hypoxic respiratory failure  2. ARDS 2/2 Covid pneumonia  3. Pneumothorax  4. Prediabetes  5. Abnormal TSH     Neuro  -Alert and oriented x 3 at baseline   -Off all drips, and calm  -C/w Klonopin to 1mg q8h  -C/w Seroquel 75 mg BID   -Decreased methadone to 10/5/10  - d/c Ativan PRN, fentanyl PRN  -CT head unremarkable   -Low concern for malignant hypothermia, NMS, or serotonin syndrome given waxing/waning and lack of inciting medications    Cardiovascular  # Shock   now off pressors   Clonidine was d/c     Pulm  #Acute hypoxic respiratory failure: secondary to Covid19 pneumonia  #ARDS  -Was on HFNC then got intubated 3/29  -Trach 4/22 continues on Vent   - Remdesivir was discontinued due to positive antibodies   - Finished Dexamethasone   - Covid19 PCR negative now, off isolation   - Daily SBT, difficulty tolerating 2/2 to agitation  - RR inc to 32 o/n for CO2 retention  - decrease rr to 22, inc tv 450    # Bacterial Pneumonia  - Stable infiltrate on CXR ,likely developed Bacterial pneumonia   - Completed ABx Zosyn, meropenem, s/p 1 dose of Vancomycin  - MRSA negative from 3/26, 5/18  - Sputum Cx growing few gram negative rods, CRE - Contact isolation  - Taper prednisone to 30 daily (5/11) for possible organizing pneumonia   - C/w bactrim empirically, TIW for PCP PPX  - Secretions from the trach, needs frequent suctions   - Pulm. Dr. Ryan  - ID Dr Anand   - Was found to have fevers 5/17  Was started on vanc and zosyn  MRSA is negative, will d/c vanc  zosyn now dc     #Pneumothorax and pneumomediastinum:   -Thoracic surgery following  -s/p Chest tube placed 4/8 pigtail to wall suction and d/c on 4/15  -On 4/18 chest tube replaced for recurrence of PTX- c/w chest tube to suction     -anterior chest tube placed 5/6 for worsening pneumothorax with resolution of PTX  5/10 CXR with recurrence of apical pneumo - tube adjusted with improvement however still persisting but stable  -Repeat Chest CT shows persistent PTX  -Water seal inferior CT -PTX was increased, readjusted tube as it was kinked.  - Lateral Ct was removed, repeat CXr showed developing of PTX in left lower lobe, resolving slowly  -Surgery was informed, following the case closely  - 5/23: plan to place CT to waterseal and repeat CXR in PM     ID  Likely bacterial pneumonia   -leukocytosis improved 9k  -Febrile 100.4 5/18 - last episode  Was started on vanc and zosyn  MRSA is negative, will d/c vanc  dc zosyn 5/22 s/p 5 days of zosyn    Nephro  -Pitting edema to both arms, ecchymosis on right AC, blisters on left arm around brachial area    -Was retaining urine, andrea was placed 5/5  -DC doxazosin for borderline BPs  potassium and phosphorus replaced this morning; monitor electrolytes     JARRED:  Patient has elevated creatinine compared to baseline, creatinine clearance is trending up.  Will start on mild IV hydration  ua shows proteinuria, triple phosphate crystals, large blood >50 rbc  FENA 6.6%, post obstructive  Monitor BMP    GI  Transaminitis:   likely secondary to Covid19, Resolved   hepatitis panel -ve  -continue to monitor LFT    Ileus  Abd xray with ileus, CT showing the same 5/15  Restart trickle feeds  G Tube off suction now  Rectal tube in place  C/w Miralax BID standing and reglan   G tube 4/23, - dressing changed daily for seroma  Patient was found to have ileus again on 5/20  Was started on lactulose suppository, NPO for now  Will monitor and slowly start on tube feeds  Cdiff negative  Start movantik    Heme  Elevated d-dimer: likely secondary to Covid19   -D-dimer 423 on admission  -Last D-dimer 1434  - No active bleeding, restarted lovenox daily    Anemia  S/p 4 PRBC total  - Now Hg stable 7-9  CTH and CT abdomen w/o contrast no evidence of bleed    No active signs of bleeding (No hematemesis, melena or hematuria)  Hemolysis w/u- negative  Iron studies show ACD  Dr Andrade on board   F/u CBC daily     Endocrine  Prediabetes:  -A1c 5.8  -BS controlled  -continue HSS    Abnormal TSH:  -TSH level noted 0.26,   -Repeat TSH 0.37 and Free T4 1.82    Skin/Catheters  No rashes. Peripheral IV lines.   Both arms with edema, right AC with ecchymosis  doppler of LUE was negative    Prophylaxis   On Lovenox for DVT   Protonix for GI proph    GOC  FULL CODE

## 2021-05-24 NOTE — BH CONSULTATION LIAISON PROGRESS NOTE - NSBHCHARTREVIEWINVESTIGATE_PSY_A_CORE FT
< from: Xray Chest 1 View- PORTABLE-Urgent (Xray Chest 1 View- PORTABLE-Urgent .) (05.23.21 @ 19:57) >    IMPRESSION:  Similar left infiltrates.    < end of copied text >  < from: Xray Abdomen 1 View PORTABLE -Urgent (Xray Abdomen 1 View PORTABLE -Urgent .) (05.23.21 @ 19:57) >    IMPRESSION:  Compatible with ileus.    < end of copied text >

## 2021-05-24 NOTE — BH CONSULTATION LIAISON PROGRESS NOTE - NSBHCONSULTRECOMMENDOTHER_PSY_A_CORE FT
1. C/w Klonopin to 1 mg q8h standing (no PRNs), with plan to further taper as tolerated  2. Reduce Seroquel to 50 mg BID standing starting tonight  3. Continue delirium precautions: Frequent reorientation, familiar pictures and objects at bedside, natural light in daytime, consistent sleep/wake schedule, adequate hydration and nutrition, sensory aids (hearing aids, glasses) present if needed, minimizing noise and overstimulation, clustering care to minimize overnight interruptions, judicious use of deliriogenic medications (anticholinergics, benzodiazepines and opioid analgesics), minimize use of restraints  4. Medical management as directed by primary team  5. Psychiatry will continue to follow  6. Case d/w ICU team    Paloma Cali MD  Director, Consultation-Liaison Psychiatry Service  [id number]

## 2021-05-25 LAB
ALBUMIN SERPL ELPH-MCNC: 2.9 G/DL — LOW (ref 3.5–5)
ALP SERPL-CCNC: 70 U/L — SIGNIFICANT CHANGE UP (ref 40–120)
ALT FLD-CCNC: 37 U/L DA — SIGNIFICANT CHANGE UP (ref 10–60)
ANION GAP SERPL CALC-SCNC: 3 MMOL/L — LOW (ref 5–17)
AST SERPL-CCNC: 29 U/L — SIGNIFICANT CHANGE UP (ref 10–40)
BASE EXCESS BLDA CALC-SCNC: 8.1 MMOL/L — HIGH (ref -2–3)
BASE EXCESS BLDA CALC-SCNC: 9.3 MMOL/L — HIGH (ref -2–3)
BILIRUB SERPL-MCNC: 1 MG/DL — SIGNIFICANT CHANGE UP (ref 0.2–1.2)
BLOOD GAS COMMENTS ARTERIAL: SIGNIFICANT CHANGE UP
BUN SERPL-MCNC: 15 MG/DL — SIGNIFICANT CHANGE UP (ref 7–18)
CALCIUM SERPL-MCNC: 9 MG/DL — SIGNIFICANT CHANGE UP (ref 8.4–10.5)
CHLORIDE SERPL-SCNC: 106 MMOL/L — SIGNIFICANT CHANGE UP (ref 96–108)
CO2 SERPL-SCNC: 32 MMOL/L — HIGH (ref 22–31)
CREAT SERPL-MCNC: 0.98 MG/DL — SIGNIFICANT CHANGE UP (ref 0.5–1.3)
GLUCOSE BLDC GLUCOMTR-MCNC: 112 MG/DL — HIGH (ref 70–99)
GLUCOSE BLDC GLUCOMTR-MCNC: 83 MG/DL — SIGNIFICANT CHANGE UP (ref 70–99)
GLUCOSE BLDC GLUCOMTR-MCNC: 99 MG/DL — SIGNIFICANT CHANGE UP (ref 70–99)
GLUCOSE SERPL-MCNC: 81 MG/DL — SIGNIFICANT CHANGE UP (ref 70–99)
HCO3 BLDA-SCNC: 35 MMOL/L — HIGH (ref 21–28)
HCO3 BLDA-SCNC: 36 MMOL/L — HIGH (ref 21–28)
HCT VFR BLD CALC: 27.1 % — LOW (ref 39–50)
HGB BLD-MCNC: 9.2 G/DL — LOW (ref 13–17)
HOROWITZ INDEX BLDA+IHG-RTO: 40 — SIGNIFICANT CHANGE UP
MAGNESIUM SERPL-MCNC: 2.1 MG/DL — SIGNIFICANT CHANGE UP (ref 1.6–2.6)
MCHC RBC-ENTMCNC: 33.9 GM/DL — SIGNIFICANT CHANGE UP (ref 32–36)
MCHC RBC-ENTMCNC: 36.7 PG — HIGH (ref 27–34)
MCV RBC AUTO: 108 FL — HIGH (ref 80–100)
NRBC # BLD: 0 /100 WBCS — SIGNIFICANT CHANGE UP (ref 0–0)
PCO2 BLDA: 55 MMHG — HIGH (ref 35–48)
PCO2 BLDA: 58 MMHG — HIGH (ref 35–48)
PH BLDA: 7.39 — SIGNIFICANT CHANGE UP (ref 7.35–7.45)
PH BLDA: 7.42 — SIGNIFICANT CHANGE UP (ref 7.35–7.45)
PHOSPHATE SERPL-MCNC: 2.7 MG/DL — SIGNIFICANT CHANGE UP (ref 2.5–4.5)
PLATELET # BLD AUTO: 234 K/UL — SIGNIFICANT CHANGE UP (ref 150–400)
PO2 BLDA: 107 MMHG — SIGNIFICANT CHANGE UP (ref 83–108)
PO2 BLDA: 95 MMHG — SIGNIFICANT CHANGE UP (ref 83–108)
POTASSIUM SERPL-MCNC: 4.1 MMOL/L — SIGNIFICANT CHANGE UP (ref 3.5–5.3)
POTASSIUM SERPL-SCNC: 4.1 MMOL/L — SIGNIFICANT CHANGE UP (ref 3.5–5.3)
PROT SERPL-MCNC: 6.3 G/DL — SIGNIFICANT CHANGE UP (ref 6–8.3)
RBC # BLD: 2.51 M/UL — LOW (ref 4.2–5.8)
RBC # FLD: 18.7 % — HIGH (ref 10.3–14.5)
SAO2 % BLDA: 100 % — SIGNIFICANT CHANGE UP
SAO2 % BLDA: 99 % — SIGNIFICANT CHANGE UP
SODIUM SERPL-SCNC: 141 MMOL/L — SIGNIFICANT CHANGE UP (ref 135–145)
WBC # BLD: 7.36 K/UL — SIGNIFICANT CHANGE UP (ref 3.8–10.5)
WBC # FLD AUTO: 7.36 K/UL — SIGNIFICANT CHANGE UP (ref 3.8–10.5)

## 2021-05-25 PROCEDURE — 74018 RADEX ABDOMEN 1 VIEW: CPT | Mod: 26

## 2021-05-25 PROCEDURE — 99232 SBSQ HOSP IP/OBS MODERATE 35: CPT

## 2021-05-25 PROCEDURE — 71045 X-RAY EXAM CHEST 1 VIEW: CPT | Mod: 26

## 2021-05-25 RX ORDER — METHADONE HYDROCHLORIDE 40 MG/1
5 TABLET ORAL EVERY 12 HOURS
Refills: 0 | Status: DISCONTINUED | OUTPATIENT
Start: 2021-05-25 | End: 2021-05-31

## 2021-05-25 RX ADMIN — NALOXEGOL OXALATE 25 MILLIGRAM(S): 12.5 TABLET, FILM COATED ORAL at 11:33

## 2021-05-25 RX ADMIN — Medication 1 MILLIGRAM(S): at 14:25

## 2021-05-25 RX ADMIN — QUETIAPINE FUMARATE 50 MILLIGRAM(S): 200 TABLET, FILM COATED ORAL at 06:08

## 2021-05-25 RX ADMIN — GABAPENTIN 100 MILLIGRAM(S): 400 CAPSULE ORAL at 21:42

## 2021-05-25 RX ADMIN — Medication 1 MILLIGRAM(S): at 06:08

## 2021-05-25 RX ADMIN — GABAPENTIN 100 MILLIGRAM(S): 400 CAPSULE ORAL at 06:08

## 2021-05-25 RX ADMIN — ENOXAPARIN SODIUM 40 MILLIGRAM(S): 100 INJECTION SUBCUTANEOUS at 11:32

## 2021-05-25 RX ADMIN — METHADONE HYDROCHLORIDE 5 MILLIGRAM(S): 40 TABLET ORAL at 06:08

## 2021-05-25 RX ADMIN — LACTULOSE 10 GRAM(S): 10 SOLUTION ORAL at 11:32

## 2021-05-25 RX ADMIN — MIDODRINE HYDROCHLORIDE 5 MILLIGRAM(S): 2.5 TABLET ORAL at 06:08

## 2021-05-25 RX ADMIN — PANTOPRAZOLE SODIUM 40 MILLIGRAM(S): 20 TABLET, DELAYED RELEASE ORAL at 11:33

## 2021-05-25 RX ADMIN — GABAPENTIN 100 MILLIGRAM(S): 400 CAPSULE ORAL at 14:25

## 2021-05-25 RX ADMIN — Medication 1 APPLICATION(S): at 06:08

## 2021-05-25 RX ADMIN — Medication 20 MILLIGRAM(S): at 06:08

## 2021-05-25 RX ADMIN — Medication 1 MILLIGRAM(S): at 21:42

## 2021-05-25 RX ADMIN — CHLORHEXIDINE GLUCONATE 1 APPLICATION(S): 213 SOLUTION TOPICAL at 06:09

## 2021-05-25 RX ADMIN — Medication 10 MILLIGRAM(S): at 11:33

## 2021-05-25 RX ADMIN — METHADONE HYDROCHLORIDE 5 MILLIGRAM(S): 40 TABLET ORAL at 18:11

## 2021-05-25 RX ADMIN — Medication 1 APPLICATION(S): at 18:11

## 2021-05-25 RX ADMIN — Medication 1000 MILLIGRAM(S): at 11:33

## 2021-05-25 RX ADMIN — QUETIAPINE FUMARATE 50 MILLIGRAM(S): 200 TABLET, FILM COATED ORAL at 18:11

## 2021-05-25 NOTE — BH CONSULTATION LIAISON PROGRESS NOTE - NSBHASSESSMENTFT_PSY_ALL_CORE
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 Psych consulted for med management due to difficulty weaning off sedation. On initial exam, pt is highly somnolent and unable to participate, but hx and current presentation appear highly c/w acute multifactorial delirium i/s/o AHRF, BZD withdrawal and opioid withdrawal. To better manage delirium with less sedation, we agree with current Klonopin dosing of 1 mg q8h standing and Seroquel 50 mg BID starting today, with plan to taper further as tolerated. Pt disposition will depend on clinical course, but there is currently no reason to believe that pt will require admission to IP psychiatry.

## 2021-05-25 NOTE — PROGRESS NOTE ADULT - ATTENDING COMMENTS
55 yr old  man , non smoker with  moody 1990s presented 3/14 with x9 days worsening cough, subjective fevers, and SOB, with x2-3 days dysuria and central, non-radiating, constant CP. Admitted to medicine unit  for acute hypoxic respiratory failure secondary to pna from covid-19 infection .     Assessment:  1. Acute hypoxic respiratory failure  2. Covid-19 infection   3. Transaminitis  4. Prediabetes  5. Bilateral pneumothorax  6. Septic shock - resolved  7. Anemia  8. bowel obstruction/ileus    Plan   -Taper methadone as tolerated,  -left apical PTX, recurrent after chest tube placed on water seal, will place back to suction for now  -S/p tracheostomy placement 4/21   -S/p G-tube 4/23  -Cont. mechanical ventilation   -Daily weaning trials, tolerating SBT with PS 8  -Will trial trach collar during day time, as ABG is acceptable on PS  -PT evaluation, upright positioning as tolerated  -On antibiotics again will complete 5 days   -Tapering dose of steroids as ordered, bactrim for PCP prophylaxis   -C-diff PCR negative  -Cont. movantik,  -D/c midodrine  -Cont. tube feedings at low rate and monitor response as abdominal distension is improving  -dvt/gi prophy  -hemodynamic monitoring   -Prognosis is guarded.

## 2021-05-25 NOTE — PROGRESS NOTE ADULT - SUBJECTIVE AND OBJECTIVE BOX
Patient is a 55y old  Male who presents with a chief complaint of SOB (24 May 2021 10:53)    pt seen in icu [ x ], reg med floor [   ], bed [ x ], chair at bedside [   ], awake and responsive [  ], mildly sedated, [ x ],    nad [x  ]      Allergies    No Known Allergies        Vitals    T(F): 98.3 (05-24-21 @ 23:31), Max: 99.1 (05-24-21 @ 12:00)  HR: 62 (05-25-21 @ 06:00) (59 - 103)  BP: 148/74 (05-25-21 @ 06:00) (110/64 - 168/81)  RR: 22 (05-25-21 @ 06:00) (15 - 31)  SpO2: 100% (05-25-21 @ 06:00) (97% - 100%)  Wt(kg): --  CAPILLARY BLOOD GLUCOSE      POCT Blood Glucose.: 83 mg/dL (25 May 2021 06:26)      Labs                          9.2    7.36  )-----------( 234      ( 25 May 2021 04:07 )             27.1       05-25    141  |  106  |  15  ----------------------------<  81  4.1   |  32<H>  |  0.98    Ca    9.0      25 May 2021 04:07  Phos  2.7     05-25  Mg     2.1     05-25    TPro  6.3  /  Alb  2.9<L>  /  TBili  1.0  /  DBili  x   /  AST  29  /  ALT  37  /  AlkPhos  70  05-25            .Sputum Sputum  04-16 @ 04:42   Moderate Enterobacter aerogenes (Carbapenem Resistant)  Normal Respiratory Monserrat present  --  Enterobacter aerogenes (Carbapenem Resistant)      .Urine Clean Catch (Midstream)  03-15 @ 00:52   No growth  --  --          Radiology Results      Meds    MEDICATIONS  (STANDING):  ascorbic acid 1000 milliGRAM(s) Oral daily  BACItracin   Ointment 1 Application(s) Topical two times a day  bisacodyl Suppository 10 milliGRAM(s) Rectal daily  chlorhexidine 2% Cloths 1 Application(s) Topical <User Schedule>  clonazePAM  Tablet 1 milliGRAM(s) Oral every 8 hours  enoxaparin Injectable 40 milliGRAM(s) SubCutaneous every 24 hours  gabapentin 100 milliGRAM(s) Oral every 8 hours  insulin lispro (ADMELOG) corrective regimen sliding scale   SubCutaneous every 6 hours  lactulose Syrup 10 Gram(s) Oral daily  methadone    Tablet 5 milliGRAM(s) Oral every 8 hours  midodrine 5 milliGRAM(s) Oral every 8 hours  naloxegol 25 milliGRAM(s) Oral daily  pantoprazole   Suspension 40 milliGRAM(s) Oral daily  predniSONE   Tablet 20 milliGRAM(s) Oral daily  QUEtiapine 50 milliGRAM(s) Oral two times a day  trimethoprim  40 mG/sulfamethoxazole 200 mG Suspension 160 milliGRAM(s) Oral <User Schedule>      MEDICATIONS  (PRN):  acetaminophen    Suspension .. 650 milliGRAM(s) Oral every 12 hours PRN Temp greater or equal to 38C (100.4F)  ALBUTerol    90 MICROgram(s) HFA Inhaler 2 Puff(s) Inhalation every 6 hours PRN Shortness of Breath and/or Wheezing  artificial  tears Solution 1 Drop(s) Both EYES every 4 hours PRN Dry Eyes  sodium chloride 0.9% lock flush 10 milliLiter(s) IV Push every 1 hour PRN Pre/post blood products, medications, blood draw, and to maintain line patency      Physical Exam    Neuro :  no focal deficits  Respiratory: CTA B/L  CV: RRR, S1S2, no murmurs,   Abdominal: Soft, NT, ND +BS, gastrostomy tube inplace  Extremities: edema of extrem, + peripheral pulses      ASSESSMENT      Hypoxemia 2nd to covid pna   transaminitis  prediabetes  h/o appendectomy  cholecystectomy        PLAN    cont cont precautions  covid 5/14/21 neg noted above  d/c remdesevir given covid ab positive noted   completed dexamethasone   started pulse steroids for 3 days - 250mg solumedrol bid now tapered off 4/14/21   C/w prednisone 20 daily    cont on bactrim empirically, TIW   cont asa, vit c,    cont albuterol inhaler   pulm f/u  procalcitonin, D-dimer, crp, ldh, ferritin, lactate noted ,    cont tylenol prn,   cont robitussin prn   pt off precedex    pt on methadone   pain mgmt eval noted   cont phenylephrine drip for hypotension  clonidine held 2nd to low bp  s/p intubation 3/29/21   s/p tracheostomy 4/21/21  O2 sat 100% (95% - 100%)mv 40%  O2 via mech vent and taper fio2 as tolerated   vent mgmt as per icu  xray 3/19/21 with pneumomediastinum  rept cxr with New trace right apical pneumothorax. New mild left apical pneumothorax. Grossly stable small pneumomediastinum.  Soft tissue emphysema at the neck bases bilaterally. Grossly stable bilateral pulmonary infiltrates noted.   cxr 2/24 with No evidence of pneumothorax can be appreciated on the available image. This may be related to patient positioning. Evidence of pneumomediastinum and subcutaneous emphysema in the lower neck is again   noted. There are patchy bibasilar infiltrates and elevated right hemidiaphragm noted.   cxr 3/29/21 with No significant change bilateral infiltrates. There is a small simple left apical pneumothorax. No significant pleural effusion. Bilateral subcutaneous emphysema similar to prior.   XRs on 7AM and 5PM with no obvious increase of ptx  cxr 4/4/21 with Improving bilateral airspace disease noted    cxr 4/8/21 with Small left pneumothorax noted.  thoracic surg f/u   s/p L chest tube replacement 4/18/21 for recurrence of left pneumothorax   cxr 4/20/21 with Unchanged advanced infiltrates and catheter left chest tube noted   CXR 4/11/21 with : No interval change compared to one day prior. No pneumothorax noted.   unable to flush or aspirate tube fully, noted debris in tubing which was milked out.   Once material removed from tubing able to aspirated and flush fully. Also changed dressing on pigtail which appears to be in good position.   Monitor O2 status   s/p tracheostomy 4/21/21   cxr 4/21/21 with No evidence of active chest disease. Tracheostomy tube in place otherwise no significant change noted   cxr 4/25/21 with A 40% LEFT pneumothorax. LEFT multi-sidehole pigtail catheter overlies LEFT lower hemithorax.. Bilateral multifocal and diffuse ill-defined airspace opacities..  Follow-up AP portable chest radiograph 4/25/2021 AT 8:58 AM: Residual LEFT 30% upper zone pneumothorax. Otherwise no interval change.noted    cxr 4/30/21 Tracheostomy tube again noted. Left chest tube noted with small left apical pneumothorax unchanged. Bilateral airspace opacities overall worsening. Heart size cannot be accurately assessed in this projection noted.   cxr 5/3/21 with Large left pneumothorax noted above.   cxr 5 4/21 with No significant interval change noted above.   ct chest with Extensive chronic appearing consolidative opacities throughout the lungs, most prominent in the left lower lobe and lingula, with bronchiectasis, scarring, and volume loss. Additional scattered peripheral coarse opacities.   Mild left pneumothorax. Left lateral pleural space pigtail catheter, not in continuity with the pneumothorax. Mild bilateral hydronephrosis, similar to appearance on CT of the abdomen and pelvis on April 27. noted above   s/p 2nd chest tube 5/5/21  cxr 5/6/21 with Tracheostomy and catheter left chest tubes remain. , There are significant diffuse advanced infiltrates again noted. No pneumothorax. Above findings are similar to study earlier in the day. Present film shows a left jugular line inserted   with tip entering the superior vena cava noted   cxr 5/11/21 with Heart magnified by technique. Tracheostomy, left jugular line, and 2 catheter left chest tubes remain. Quite advanced infiltration in the lungs particularly in the left lower lobe again noted.  There is a persistent rather small left apical pneumothorax. Chest is similar to May 10 noted    cxt 5/14/21 with Perihilar diffuse airspace disease and LEFT lower lobe retrocardiac airspace consolidation. LEFT chest tube overlies upper zone. Small LEFT apical less than 5% pneumothorax noted   cxr 5/17/21 with Similar infiltrates. Small left pneumothorax similar to the prior study noted.   cxr 5/19/21 with Persistent mild to moderate left pneumothorax despite chest tube noted   cxr 5/20 with Improved left pneumothorax. Significant infiltration particularly in the left lower lobe again noted   cxr 5/21/21 with No appreciable pneumothorax at this time. Persistent advanced infiltrates noted    cxr 5/22/21 with  Left chest pigtail. Status post tracheostomy. Heart size unremarkable. No pneumothorax. Multiple patchy opacities throughout the left lung. Status post gastrostomy. No dilated loops of bowel noted above.   cxr 5/23/21 with Tracheostomy tube and within the left pigtail catheter are unchanged. Stable hazy opacities, left greater than the right. No pneumothorax noted above.  s/p surgical placement of gastrostomy tube 4/23/2021.   abd xray 5/10/21 with ileus noted   dressing changed daily for seroma   abd xray 5/21/21 with Decreased bowel ileus compared to prior 5/20/2021 exam noted above.   abd xray 5/22/21 with No dilated loops of bowel noted above.  cont tube feeding   CT scan of the chest 5/13/21 demonstrates diffuse bilateral infiltrates with a region of consolidation at the left lung base. Small left pneumothorax. 2 left chest tubes noted in place noted above.  CT scan of the abdomen and pelvis 5/13/21 demonstrates diffuse dilatation of small and large bowel loops most consistent with ileus noted   xray abd with  5/14/21 with Ingested contrast within nonobstructed large bowel to the level of rectum. No acute radiographic intra-abdominal findings noted above.  id f/u   patient completed course of Meropenem  leukocytosis resolved  sputum cx with Enterobacter aerogenes (Carbapenem Resistant) noted above   tmx 98.9   ua positive    andrea changed  Completed  meropenem, s/p 1 dose of Vancomycin   completed zosyn  lispro ss   prognosis poor   s/p 4 units prbc for anemia  f/u h/h    transfuse prbc as needed  heme onc f/u  retic is elevated.    Haptoglobin normal  ? daily phlebotomy  no hemolysis, Fe/B12/folate adequate  hemolysis is unlikely even his baseline haptoglobin may be very elevated due to COVID  direct pamella neg noted    psych f/u  Reduce dose of Klonopin to 1 mg q8h standing (no PRNs), with plan to further taper as tolerated  Continue delirium precautions: Frequent reorientation, familiar pictures and objects at bedside, natural light in daytime,   consistent sleep/wake schedule, adequate hydration and nutrition, sensory aids (hearing aids, glasses) present if needed, minimizing noise and overstimulation,   clustering care to minimize overnight interruptions, judicious use of deliriogenic medications (anticholinergics, benzodiazepines and opioid analgesics), minimize use of restraints  cont current meds   mgmt as per icu         Patient is a 55y old  Male who presents with a chief complaint of SOB (24 May 2021 10:53)    pt seen in icu [ x ], reg med floor [   ], bed [ x ], chair at bedside [   ], awake and responsive [ x ], mildly sedated, [  ],    nad [x  ]      Allergies    No Known Allergies        Vitals    T(F): 98.3 (05-24-21 @ 23:31), Max: 99.1 (05-24-21 @ 12:00)  HR: 62 (05-25-21 @ 06:00) (59 - 103)  BP: 148/74 (05-25-21 @ 06:00) (110/64 - 168/81)  RR: 22 (05-25-21 @ 06:00) (15 - 31)  SpO2: 100% (05-25-21 @ 06:00) (97% - 100%)  Wt(kg): --  CAPILLARY BLOOD GLUCOSE      POCT Blood Glucose.: 83 mg/dL (25 May 2021 06:26)      Labs                          9.2    7.36  )-----------( 234      ( 25 May 2021 04:07 )             27.1       05-25    141  |  106  |  15  ----------------------------<  81  4.1   |  32<H>  |  0.98    Ca    9.0      25 May 2021 04:07  Phos  2.7     05-25  Mg     2.1     05-25    TPro  6.3  /  Alb  2.9<L>  /  TBili  1.0  /  DBili  x   /  AST  29  /  ALT  37  /  AlkPhos  70  05-25            .Sputum Sputum  04-16 @ 04:42   Moderate Enterobacter aerogenes (Carbapenem Resistant)  Normal Respiratory Monserrat present  --  Enterobacter aerogenes (Carbapenem Resistant)      .Urine Clean Catch (Midstream)  03-15 @ 00:52   No growth  --  --          Radiology Results      Meds    MEDICATIONS  (STANDING):  ascorbic acid 1000 milliGRAM(s) Oral daily  BACItracin   Ointment 1 Application(s) Topical two times a day  bisacodyl Suppository 10 milliGRAM(s) Rectal daily  chlorhexidine 2% Cloths 1 Application(s) Topical <User Schedule>  clonazePAM  Tablet 1 milliGRAM(s) Oral every 8 hours  enoxaparin Injectable 40 milliGRAM(s) SubCutaneous every 24 hours  gabapentin 100 milliGRAM(s) Oral every 8 hours  insulin lispro (ADMELOG) corrective regimen sliding scale   SubCutaneous every 6 hours  lactulose Syrup 10 Gram(s) Oral daily  methadone    Tablet 5 milliGRAM(s) Oral every 8 hours  midodrine 5 milliGRAM(s) Oral every 8 hours  naloxegol 25 milliGRAM(s) Oral daily  pantoprazole   Suspension 40 milliGRAM(s) Oral daily  predniSONE   Tablet 20 milliGRAM(s) Oral daily  QUEtiapine 50 milliGRAM(s) Oral two times a day  trimethoprim  40 mG/sulfamethoxazole 200 mG Suspension 160 milliGRAM(s) Oral <User Schedule>      MEDICATIONS  (PRN):  acetaminophen    Suspension .. 650 milliGRAM(s) Oral every 12 hours PRN Temp greater or equal to 38C (100.4F)  ALBUTerol    90 MICROgram(s) HFA Inhaler 2 Puff(s) Inhalation every 6 hours PRN Shortness of Breath and/or Wheezing  artificial  tears Solution 1 Drop(s) Both EYES every 4 hours PRN Dry Eyes  sodium chloride 0.9% lock flush 10 milliLiter(s) IV Push every 1 hour PRN Pre/post blood products, medications, blood draw, and to maintain line patency      Physical Exam    Neuro :  no focal deficits  Respiratory: CTA B/L  CV: RRR, S1S2, no murmurs,   Abdominal: Soft, NT, ND +BS, gastrostomy tube inplace  Extremities: edema of extrem, + peripheral pulses      ASSESSMENT      Hypoxemia 2nd to covid pna   transaminitis  prediabetes  h/o appendectomy  cholecystectomy        PLAN    cont cont precautions  covid 5/14/21 neg noted above  d/c remdesevir given covid ab positive noted   completed dexamethasone   started pulse steroids for 3 days - 250mg solumedrol bid now tapered off 4/14/21   C/w prednisone 20 daily    cont on bactrim empirically, TIW   cont asa, vit c,    cont albuterol inhaler   pulm f/u  procalcitonin, D-dimer, crp, ldh, ferritin, lactate noted ,    cont tylenol prn,   cont robitussin prn   pt off precedex    pt on methadone   pain mgmt eval noted   cont phenylephrine drip for hypotension  clonidine held 2nd to low bp  s/p intubation 3/29/21   s/p tracheostomy 4/21/21  O2 sat 100% (97% - 100%) mv 40%  O2 via mech vent and taper fio2 as tolerated   vent mgmt as per icu  xray 3/19/21 with pneumomediastinum  rept cxr with New trace right apical pneumothorax. New mild left apical pneumothorax. Grossly stable small pneumomediastinum.  Soft tissue emphysema at the neck bases bilaterally. Grossly stable bilateral pulmonary infiltrates noted.   cxr 2/24 with No evidence of pneumothorax can be appreciated on the available image. This may be related to patient positioning. Evidence of pneumomediastinum and subcutaneous emphysema in the lower neck is again   noted. There are patchy bibasilar infiltrates and elevated right hemidiaphragm noted.   cxr 3/29/21 with No significant change bilateral infiltrates. There is a small simple left apical pneumothorax. No significant pleural effusion. Bilateral subcutaneous emphysema similar to prior.   XRs on 7AM and 5PM with no obvious increase of ptx  cxr 4/4/21 with Improving bilateral airspace disease noted    cxr 4/8/21 with Small left pneumothorax noted.  thoracic surg f/u   s/p L chest tube replacement 4/18/21 for recurrence of left pneumothorax   cxr 4/20/21 with Unchanged advanced infiltrates and catheter left chest tube noted   CXR 4/11/21 with : No interval change compared to one day prior. No pneumothorax noted.   unable to flush or aspirate tube fully, noted debris in tubing which was milked out.   Once material removed from tubing able to aspirated and flush fully. Also changed dressing on pigtail which appears to be in good position.   Monitor O2 status   s/p tracheostomy 4/21/21   cxr 4/21/21 with No evidence of active chest disease. Tracheostomy tube in place otherwise no significant change noted   cxr 4/25/21 with A 40% LEFT pneumothorax. LEFT multi-sidehole pigtail catheter overlies LEFT lower hemithorax.. Bilateral multifocal and diffuse ill-defined airspace opacities..  Follow-up AP portable chest radiograph 4/25/2021 AT 8:58 AM: Residual LEFT 30% upper zone pneumothorax. Otherwise no interval change.noted    cxr 4/30/21 Tracheostomy tube again noted. Left chest tube noted with small left apical pneumothorax unchanged. Bilateral airspace opacities overall worsening. Heart size cannot be accurately assessed in this projection noted.   cxr 5/3/21 with Large left pneumothorax noted above.   cxr 5 4/21 with No significant interval change noted above.   ct chest with Extensive chronic appearing consolidative opacities throughout the lungs, most prominent in the left lower lobe and lingula, with bronchiectasis, scarring, and volume loss. Additional scattered peripheral coarse opacities.   Mild left pneumothorax. Left lateral pleural space pigtail catheter, not in continuity with the pneumothorax. Mild bilateral hydronephrosis, similar to appearance on CT of the abdomen and pelvis on April 27. noted above   s/p 2nd chest tube 5/5/21  cxr 5/6/21 with Tracheostomy and catheter left chest tubes remain. , There are significant diffuse advanced infiltrates again noted. No pneumothorax. Above findings are similar to study earlier in the day. Present film shows a left jugular line inserted   with tip entering the superior vena cava noted   cxr 5/11/21 with Heart magnified by technique. Tracheostomy, left jugular line, and 2 catheter left chest tubes remain. Quite advanced infiltration in the lungs particularly in the left lower lobe again noted.  There is a persistent rather small left apical pneumothorax. Chest is similar to May 10 noted    cxt 5/14/21 with Perihilar diffuse airspace disease and LEFT lower lobe retrocardiac airspace consolidation. LEFT chest tube overlies upper zone. Small LEFT apical less than 5% pneumothorax noted   cxr 5/17/21 with Similar infiltrates. Small left pneumothorax similar to the prior study noted.   cxr 5/19/21 with Persistent mild to moderate left pneumothorax despite chest tube noted   cxr 5/20 with Improved left pneumothorax. Significant infiltration particularly in the left lower lobe again noted   cxr 5/21/21 with No appreciable pneumothorax at this time. Persistent advanced infiltrates noted    cxr 5/22/21 with  Left chest pigtail. Status post tracheostomy. Heart size unremarkable. No pneumothorax. Multiple patchy opacities throughout the left lung. Status post gastrostomy. No dilated loops of bowel noted above.   cxr 5/23/21 with Tracheostomy tube and within the left pigtail catheter are unchanged. Stable hazy opacities, left greater than the right. No pneumothorax noted above.  s/p surgical placement of gastrostomy tube 4/23/2021.   abd xray 5/10/21 with ileus noted   dressing changed daily for seroma   abd xray 5/21/21 with Decreased bowel ileus compared to prior 5/20/2021 exam noted above.   abd xray 5/22/21 with No dilated loops of bowel noted above.  cont tube feeding   CT scan of the chest 5/13/21 demonstrates diffuse bilateral infiltrates with a region of consolidation at the left lung base. Small left pneumothorax. 2 left chest tubes noted in place noted above.  CT scan of the abdomen and pelvis 5/13/21 demonstrates diffuse dilatation of small and large bowel loops most consistent with ileus noted   xray abd with  5/14/21 with Ingested contrast within nonobstructed large bowel to the level of rectum. No acute radiographic intra-abdominal findings noted above.  id f/u   patient completed course of Meropenem  leukocytosis resolved  sputum cx with Enterobacter aerogenes (Carbapenem Resistant) noted above   tmx 99.1    andrea changed  Completed  meropenem, s/p 1 dose of Vancomycin   completed zosyn  lispro ss   prognosis poor   s/p 4 units prbc for anemia  f/u h/h    transfuse prbc as needed  heme onc f/u  retic is elevated.    Haptoglobin normal  ? daily phlebotomy  no hemolysis, Fe/B12/folate adequate  hemolysis is unlikely even his baseline haptoglobin may be very elevated due to COVID  direct pamella neg noted    psych f/u  C/w Klonopin to 1 mg q8h standing (no PRNs), with plan to further taper as tolerated  Reduce Seroquel to 50 mg BID standing starting tonight  Continue delirium precautions: Frequent reorientation, familiar pictures and objects at bedside, natural light in daytime,   consistent sleep/wake schedule, adequate hydration and nutrition, sensory aids (hearing aids, glasses) present if needed, minimizing noise and overstimulation,   clustering care to minimize overnight interruptions, judicious use of deliriogenic medications (anticholinergics, benzodiazepines and opioid analgesics), minimize use of restraints  cont current meds   mgmt as per icu

## 2021-05-25 NOTE — PROGRESS NOTE ADULT - PROBLEM SELECTOR PLAN 1
Office received labs via fax.    isolation precautions DC  Cont mechanical ventilation - S.P trach.   Wean as tolerated  SBT daily as tolerated  Tracheal care  Pulmonary toilet  Decubitus prevention  Supplemental nutrition  ICU management.   Follow up ABGs  Monitor oxygen sat  Monitor LFT, LDH, CRP, D-Dimer, Ferritin and procalcitonin  Vit C, D and zinc supp  Montelukast 10 mgs po Qhs  Daily CXR   G-tube with feeds  Replace lytes  Pulmonary toilet  DVT and GI PPX.

## 2021-05-25 NOTE — PROGRESS NOTE ADULT - ASSESSMENT
Assessment and plan: 54 y/o M with no PMH is admitted to ICU for AHRF 2/2 covid19 pna     1. Acute hypoxic respiratory failure  2. ARDS 2/2 Covid pneumonia  3. Pneumothorax  4. Prediabetes  5. Abnormal TSH     Neuro  -Alert and oriented x 3 at baseline   -Off all drips, and calm  -C/w Klonopin to 1mg q8h  -C/w Seroquel 75 mg BID   -Decreased methadone to 10/5/10  - d/c Ativan PRN, fentanyl PRN  -CT head unremarkable   -Low concern for malignant hypothermia, NMS, or serotonin syndrome given waxing/waning and lack of inciting medications    Cardiovascular  # Shock   now off pressors   Clonidine was d/c     Pulm  #Acute hypoxic respiratory failure: secondary to Covid19 pneumonia  #ARDS  -Was on HFNC then got intubated 3/29  -Trach 4/22 continues on Vent   - Remdesivir was discontinued due to positive antibodies   - Finished Dexamethasone   - Covid19 PCR negative now, off isolation   - Daily SBT, difficulty tolerating 2/2 to agitation  - RR inc to 32 o/n for CO2 retention  - decrease rr to 22, inc tv 450    # Bacterial Pneumonia  - Stable infiltrate on CXR ,likely developed Bacterial pneumonia   - Completed ABx Zosyn, meropenem, s/p 1 dose of Vancomycin  - MRSA negative from 3/26, 5/18  - Sputum Cx growing few gram negative rods, CRE - Contact isolation  - Taper prednisone to 30 daily (5/11) for possible organizing pneumonia   - C/w bactrim empirically, TIW for PCP PPX  - Secretions from the trach, needs frequent suctions   - Pulm. Dr. Ryan  - ID Dr Anand   - Was found to have fevers 5/17  Was started on vanc and zosyn  MRSA is negative, will d/c vanc  zosyn now dc     #Pneumothorax and pneumomediastinum:   -Thoracic surgery following  -s/p Chest tube placed 4/8 pigtail to wall suction and d/c on 4/15  -On 4/18 chest tube replaced for recurrence of PTX- c/w chest tube to suction     -anterior chest tube placed 5/6 for worsening pneumothorax with resolution of PTX  5/10 CXR with recurrence of apical pneumo - tube adjusted with improvement however still persisting but stable  -Repeat Chest CT shows persistent PTX  -Water seal inferior CT -PTX was increased, readjusted tube as it was kinked.  - Lateral Ct was removed, repeat CXr showed developing of PTX in left lower lobe, resolving slowly  -Surgery was informed, following the case closely  - 5/23: plan to place CT to waterseal and repeat CXR in PM     ID  Likely bacterial pneumonia   -leukocytosis improved 9k  -Febrile 100.4 5/18 - last episode  Was started on vanc and zosyn  MRSA is negative, will d/c vanc  dc zosyn 5/22 s/p 5 days of zosyn    Nephro  -Pitting edema to both arms, ecchymosis on right AC, blisters on left arm around brachial area    -Was retaining urine, andrea was placed 5/5  -DC doxazosin for borderline BPs  potassium and phosphorus replaced this morning; monitor electrolytes     JARRED:  Patient has elevated creatinine compared to baseline, creatinine clearance is trending up.  Will start on mild IV hydration  ua shows proteinuria, triple phosphate crystals, large blood >50 rbc  FENA 6.6%, post obstructive  Monitor BMP    GI  Transaminitis:   likely secondary to Covid19, Resolved   hepatitis panel -ve  -continue to monitor LFT    Ileus  Abd xray with ileus, CT showing the same 5/15  Restart trickle feeds  G Tube off suction now  Rectal tube in place  C/w Miralax BID standing and reglan   G tube 4/23, - dressing changed daily for seroma  Patient was found to have ileus again on 5/20  Was started on lactulose suppository, NPO for now  Will monitor and slowly start on tube feeds  Cdiff negative  Start movantik    Heme  Elevated d-dimer: likely secondary to Covid19   -D-dimer 423 on admission  -Last D-dimer 1434  - No active bleeding, restarted lovenox daily    Anemia  S/p 4 PRBC total  - Now Hg stable 7-9  CTH and CT abdomen w/o contrast no evidence of bleed    No active signs of bleeding (No hematemesis, melena or hematuria)  Hemolysis w/u- negative  Iron studies show ACD  Dr Andrade on board   F/u CBC daily     Endocrine  Prediabetes:  -A1c 5.8  -BS controlled  -continue HSS    Abnormal TSH:  -TSH level noted 0.26,   -Repeat TSH 0.37 and Free T4 1.82    Skin/Catheters  No rashes. Peripheral IV lines.   Both arms with edema, right AC with ecchymosis  doppler of LUE was negative    Prophylaxis   On Lovenox for DVT   Protonix for GI proph    GOC  FULL CODE Assessment and plan: 56 y/o M with no PMH is admitted to ICU for AHRF 2/2 covid19 pna     1. Acute hypoxic respiratory failure  2. ARDS 2/2 Covid pneumonia  3. Pneumothorax  4. Prediabetes  5. Abnormal TSH     Neuro  -Alert and oriented x 3 at baseline   -Off all drips, and calm  -C/w Klonopin to 1mg q8h  -C/w Seroquel 75 mg BID   -Decreased methadone to 10/5/10  - d/c Ativan PRN, fentanyl PRN  -CT head unremarkable   -Low concern for malignant hypothermia, NMS, or serotonin syndrome given waxing/waning and lack of inciting medications    Cardiovascular  # Shock   now off pressors   Clonidine was d/c     Pulm  #Acute hypoxic respiratory failure: secondary to Covid19 pneumonia  #ARDS  -Was on HFNC then got intubated 3/29  -Trach 4/22 continues on Vent   - Remdesivir was discontinued due to positive antibodies   - Finished Dexamethasone   - Covid19 PCR negative now, off isolation   - Daily SBT  Tolerated well today, will put on trach collar  Full vent support overnight    # Bacterial Pneumonia  - Stable infiltrate on CXR ,likely developed Bacterial pneumonia   - Completed ABx Zosyn, meropenem, s/p 1 dose of Vancomycin  - MRSA negative from 3/26, 5/18  - Sputum Cx growing few gram negative rods, CRE - Contact isolation  - Taper prednisone to 30 daily (5/11) for possible organizing pneumonia   - C/w bactrim empirically, TIW for PCP PPX  - Secretions from the trach, needs frequent suctions   - Pulm. Dr. Ryan  - ID Dr Anand   - Was found to have fevers 5/17  Was started on vanc and zosyn  MRSA is negative, will d/c vanc  zosyn now dc     #Pneumothorax and pneumomediastinum:   -Thoracic surgery following  -s/p Chest tube placed 4/8 pigtail to wall suction and d/c on 4/15  -On 4/18 chest tube replaced for recurrence of PTX- c/w chest tube to suction     -anterior chest tube placed 5/6 for worsening pneumothorax with resolution of PTX  5/10 CXR with recurrence of apical pneumo - tube adjusted with improvement however still persisting but stable  -Repeat Chest CT shows persistent PTX  -Water seal inferior CT -PTX was increased, readjusted tube as it was kinked.  - Lateral Ct was removed, repeat CXr showed developing of PTX in left lower lobe, resolving slowly  -Surgery was informed, following the case closely  - 5/23:  place CT to waterseal  5/24 reformation of PTX  5/24 CT to suction, CXR improvement    ID  Likely bacterial pneumonia   -leukocytosis improved 9k  -Febrile 100.4 5/18 - last episode  Was started on vanc and zosyn  MRSA is negative, will d/c vanc  dc zosyn 5/22 s/p 5 days of zosyn    Nephro  -Pitting edema to both arms, ecchymosis on right AC, blisters on left arm around brachial area    -Was retaining urine, andrea was placed 5/5  -DC doxazosin for borderline BPs  potassium and phosphorus replaced this morning; monitor electrolytes     JARRED:  Patient has elevated creatinine compared to baseline, creatinine clearance is trending up.  Will start on mild IV hydration  ua shows proteinuria, triple phosphate crystals, large blood >50 rbc  FENA 6.6%, post obstructive  Monitor BMP    GI  Transaminitis:   likely secondary to Covid19, Resolved   hepatitis panel -ve  -continue to monitor LFT    Ileus  Abd xray with ileus, CT showing the same 5/15  Restart trickle feeds  G Tube off suction now  Rectal tube in place  C/w Miralax BID standing and reglan   G tube 4/23, - dressing changed daily for seroma  Patient was found to have ileus again on 5/20  Was started on lactulose suppository  slowly start on tube feeds  Cdiff negative  Start movantik    Heme  Elevated d-dimer: likely secondary to Covid19   -D-dimer 423 on admission  -Last D-dimer 1434  - No active bleeding, restarted lovenox daily    Anemia  S/p 4 PRBC total  - Now Hg stable 7-9  CTH and CT abdomen w/o contrast no evidence of bleed    No active signs of bleeding (No hematemesis, melena or hematuria)  Hemolysis w/u- negative  Iron studies show ACD  Dr Andrade on board   F/u CBC daily     Endocrine  Prediabetes:  -A1c 5.8  -BS controlled  -continue HSS    Abnormal TSH:  -TSH level noted 0.26,   -Repeat TSH 0.37 and Free T4 1.82    Skin/Catheters  No rashes. Peripheral IV lines.   Both arms with edema, right AC with ecchymosis  doppler of LUE was negative    Prophylaxis   On Lovenox for DVT   Protonix for GI proph    GOC  FULL CODE

## 2021-05-25 NOTE — PROGRESS NOTE ADULT - SUBJECTIVE AND OBJECTIVE BOX
Patient is a 55y old  Male who presents with a chief complaint of SOB (25 May 2021 07:53)  Awake, alert, laying in bed in NAD. Currently on SBT. Remains on ventilator via trach. Diaphoretic     INTERVAL HPI/OVERNIGHT EVENTS:      VITAL SIGNS:  T(F): 98.3 (05-24-21 @ 23:31)  HR: 76 (05-25-21 @ 08:28)  BP: 143/74 (05-25-21 @ 08:00)  RR: 20 (05-25-21 @ 08:00)  SpO2: 98% (05-25-21 @ 08:28)  Wt(kg): --  I&O's Detail    24 May 2021 07:01  -  25 May 2021 07:00  --------------------------------------------------------  IN:    dextrose 5% + sodium chloride 0.9%: 100 mL    Enteral Tube Flush: 120 mL    Jevity 1.5: 130 mL  Total IN: 350 mL    OUT:    Chest Tube (mL): 20 mL    Indwelling Catheter - Urethral (mL): 1405 mL  Total OUT: 1425 mL    Total NET: -1075 mL      25 May 2021 07:01  -  25 May 2021 10:40  --------------------------------------------------------  IN:    Jevity 1.5: 10 mL  Total IN: 10 mL    OUT:  Total OUT: 0 mL    Total NET: 10 mL        Mode: AC/ CMV (Assist Control/ Continuous Mandatory Ventilation)  RR (machine): 22  TV (machine): 450  FiO2: 40  PEEP: 5  ITime: 1  MAP: 13  PIP: 32        REVIEW OF SYSTEMS:    CONSTITUTIONAL:  No fevers, chills, sweats    HEENT:  Eyes:  No diplopia or blurred vision. ENT:  No earache, sore throat or runny nose.    CARDIOVASCULAR:  No pressure, squeezing, tightness, or heaviness about the chest; no palpitations.    RESPIRATORY:  Per HPI    GASTROINTESTINAL:  No abdominal pain, nausea, vomiting or diarrhea.    GENITOURINARY:  No dysuria, frequency or urgency.    NEUROLOGIC:  No paresthesias, fasciculations, seizures or weakness.    PSYCHIATRIC:  No disorder of thought or mood.      PHYSICAL EXAM:    Constitutional: Well developed and nourished  Eyes:Perrla  ENMT: normal  Neck:supple  Respiratory: good air entry  Cardiovascular: S1 S2 regular  Gastrointestinal: Soft, Non tender  Extremities: No edema  Vascular:normal  Neurological:Awake, alert,Ox3  Musculoskeletal:Normal      MEDICATIONS  (STANDING):  ascorbic acid 1000 milliGRAM(s) Oral daily  BACItracin   Ointment 1 Application(s) Topical two times a day  bisacodyl Suppository 10 milliGRAM(s) Rectal daily  chlorhexidine 2% Cloths 1 Application(s) Topical <User Schedule>  clonazePAM  Tablet 1 milliGRAM(s) Oral every 8 hours  enoxaparin Injectable 40 milliGRAM(s) SubCutaneous every 24 hours  gabapentin 100 milliGRAM(s) Oral every 8 hours  insulin lispro (ADMELOG) corrective regimen sliding scale   SubCutaneous every 6 hours  lactulose Syrup 10 Gram(s) Oral daily  methadone    Tablet 5 milliGRAM(s) Oral every 12 hours  midodrine 5 milliGRAM(s) Oral every 8 hours  naloxegol 25 milliGRAM(s) Oral daily  pantoprazole   Suspension 40 milliGRAM(s) Oral daily  predniSONE   Tablet 20 milliGRAM(s) Oral daily  QUEtiapine 50 milliGRAM(s) Oral two times a day  trimethoprim  40 mG/sulfamethoxazole 200 mG Suspension 160 milliGRAM(s) Oral <User Schedule>    MEDICATIONS  (PRN):  acetaminophen    Suspension .. 650 milliGRAM(s) Oral every 12 hours PRN Temp greater or equal to 38C (100.4F)  ALBUTerol    90 MICROgram(s) HFA Inhaler 2 Puff(s) Inhalation every 6 hours PRN Shortness of Breath and/or Wheezing  artificial  tears Solution 1 Drop(s) Both EYES every 4 hours PRN Dry Eyes  sodium chloride 0.9% lock flush 10 milliLiter(s) IV Push every 1 hour PRN Pre/post blood products, medications, blood draw, and to maintain line patency      Allergies    No Known Allergies    Intolerances        LABS:                        9.2    7.36  )-----------( 234      ( 25 May 2021 04:07 )             27.1     05-25    141  |  106  |  15  ----------------------------<  81  4.1   |  32<H>  |  0.98    Ca    9.0      25 May 2021 04:07  Phos  2.7     05-25  Mg     2.1     05-25    TPro  6.3  /  Alb  2.9<L>  /  TBili  1.0  /  DBili  x   /  AST  29  /  ALT  37  /  AlkPhos  70  05-25        ABG - ( 25 May 2021 10:32 )  pH, Arterial: 7.39  pH, Blood: x     /  pCO2: 58    /  pO2: 107   / HCO3: 35    / Base Excess: 8.1   /  SaO2: 100                   CAPILLARY BLOOD GLUCOSE      POCT Blood Glucose.: 83 mg/dL (25 May 2021 06:26)  POCT Blood Glucose.: 84 mg/dL (24 May 2021 23:56)  POCT Blood Glucose.: 133 mg/dL (24 May 2021 16:03)  POCT Blood Glucose.: 129 mg/dL (24 May 2021 11:20)        RADIOLOGY & ADDITIONAL TESTS:    CXR:  < from: Xray Chest 1 View- PORTABLE-Urgent (Xray Chest 1 View- PORTABLE-Urgent .) (05.24.21 @ 15:32) >  IMPRESSION:   No interval change..    < end of copied text >    Ct scan chest:    ekg;    echo:

## 2021-05-25 NOTE — PROGRESS NOTE ADULT - SUBJECTIVE AND OBJECTIVE BOX
INTERVAL HPI/OVERNIGHT EVENTS: no overnight events    PRESSORS: [ ] YES [ ] NO  WHICH:    ANTIBIOTICS:                      Antimicrobial:  trimethoprim  40 mG/sulfamethoxazole 200 mG Suspension 160 milliGRAM(s) Oral <User Schedule>    Cardiovascular:  midodrine 5 milliGRAM(s) Oral every 8 hours    Pulmonary:  ALBUTerol    90 MICROgram(s) HFA Inhaler 2 Puff(s) Inhalation every 6 hours PRN    Hematalogic:  enoxaparin Injectable 40 milliGRAM(s) SubCutaneous every 24 hours    Other:  acetaminophen    Suspension .. 650 milliGRAM(s) Oral every 12 hours PRN  artificial  tears Solution 1 Drop(s) Both EYES every 4 hours PRN  ascorbic acid 1000 milliGRAM(s) Oral daily  BACItracin   Ointment 1 Application(s) Topical two times a day  bisacodyl Suppository 10 milliGRAM(s) Rectal daily  chlorhexidine 2% Cloths 1 Application(s) Topical <User Schedule>  clonazePAM  Tablet 1 milliGRAM(s) Oral every 8 hours  gabapentin 100 milliGRAM(s) Oral every 8 hours  insulin lispro (ADMELOG) corrective regimen sliding scale   SubCutaneous every 6 hours  lactulose Syrup 10 Gram(s) Oral daily  methadone    Tablet 5 milliGRAM(s) Oral every 8 hours  naloxegol 25 milliGRAM(s) Oral daily  pantoprazole   Suspension 40 milliGRAM(s) Oral daily  predniSONE   Tablet 20 milliGRAM(s) Oral daily  QUEtiapine 50 milliGRAM(s) Oral two times a day  sodium chloride 0.9% lock flush 10 milliLiter(s) IV Push every 1 hour PRN    acetaminophen    Suspension .. 650 milliGRAM(s) Oral every 12 hours PRN  ALBUTerol    90 MICROgram(s) HFA Inhaler 2 Puff(s) Inhalation every 6 hours PRN  artificial  tears Solution 1 Drop(s) Both EYES every 4 hours PRN  ascorbic acid 1000 milliGRAM(s) Oral daily  BACItracin   Ointment 1 Application(s) Topical two times a day  bisacodyl Suppository 10 milliGRAM(s) Rectal daily  chlorhexidine 2% Cloths 1 Application(s) Topical <User Schedule>  clonazePAM  Tablet 1 milliGRAM(s) Oral every 8 hours  enoxaparin Injectable 40 milliGRAM(s) SubCutaneous every 24 hours  gabapentin 100 milliGRAM(s) Oral every 8 hours  insulin lispro (ADMELOG) corrective regimen sliding scale   SubCutaneous every 6 hours  lactulose Syrup 10 Gram(s) Oral daily  methadone    Tablet 5 milliGRAM(s) Oral every 8 hours  midodrine 5 milliGRAM(s) Oral every 8 hours  naloxegol 25 milliGRAM(s) Oral daily  pantoprazole   Suspension 40 milliGRAM(s) Oral daily  predniSONE   Tablet 20 milliGRAM(s) Oral daily  QUEtiapine 50 milliGRAM(s) Oral two times a day  sodium chloride 0.9% lock flush 10 milliLiter(s) IV Push every 1 hour PRN  trimethoprim  40 mG/sulfamethoxazole 200 mG Suspension 160 milliGRAM(s) Oral <User Schedule>    Drug Dosing Weight  Height (cm): 167.6 (23 Apr 2021 23:15)  Weight (kg): 83.9 (23 Apr 2021 23:15)  BMI (kg/m2): 29.9 (23 Apr 2021 23:15)  BSA (m2): 1.93 (23 Apr 2021 23:15)    CENTRAL LINE: [ ] YES [ ] NO  LOCATION:   DATE INSERTED:  REMOVE: [ ] YES [ ] NO  EXPLAIN:    RIVERA: [ ] YES [ ] NO    DATE INSERTED:  REMOVE:  [ ] YES [ ] NO  EXPLAIN:    A-LINE:  [ ] YES [ ] NO  LOCATION:   DATE INSERTED:  REMOVE:  [ ] YES [ ] NO  EXPLAIN:    PMH -reviewed admission note, no change since admission    ICU Vital Signs Last 24 Hrs  T(C): 36.8 (24 May 2021 23:31), Max: 37.3 (24 May 2021 12:00)  T(F): 98.3 (24 May 2021 23:31), Max: 99.1 (24 May 2021 12:00)  HR: 62 (25 May 2021 06:00) (59 - 103)  BP: 148/74 (25 May 2021 06:00) (110/64 - 168/81)  BP(mean): 91 (25 May 2021 06:00) (75 - 105)  ABP: --  ABP(mean): --  RR: 22 (25 May 2021 06:00) (15 - 31)  SpO2: 100% (25 May 2021 06:00) (97% - 100%)      ABG - ( 24 May 2021 10:22 )  pH, Arterial: 7.31  pH, Blood: x     /  pCO2: 66    /  pO2: 95    / HCO3: 33    / Base Excess: 4.8   /  SaO2: 99                    05-24 @ 07:01  -  05-25 @ 07:00  --------------------------------------------------------  IN: 340 mL / OUT: 1425 mL / NET: -1085 mL        Mode: AC/ CMV (Assist Control/ Continuous Mandatory Ventilation)  RR (machine): 22  TV (machine): 450  FiO2: 40  PEEP: 5  ITime: 0.7  MAP: 12  PIP: 29      PHYSICAL EXAM:    GENERAL: nad  HEAD:  Atraumatic, Normocephalic  EYES: EOMI, PERRLA, conjunctiva and sclera clear  ENMT: No tonsillar erythema, exudates, or enlargement; Moist mucous membranes, Good dentition, No lesions  NECK: Supple, normal appearance, No JVD; Normal thyroid; Trachea midline  NERVOUS SYSTEM:  Alert & Oriented, obeying commands  CHEST/LUNG: No chest deformity; Normal percussion bilaterally; No rales, rhonchi, wheezing   HEART: Regular rate and rhythm; No murmurs, rubs, or gallops  ABDOMEN: Soft, Nontender, Nondistended; Bowel sounds present  EXTREMITIES:  2+ Peripheral Pulses, No clubbing, cyanosis, or edema  LYMPH: No lymphadenopathy noted  SKIN: No rashes or lesions; Good capillary refill      LABS:  CBC Full  -  ( 25 May 2021 04:07 )  WBC Count : 7.36 K/uL  RBC Count : 2.51 M/uL  Hemoglobin : 9.2 g/dL  Hematocrit : 27.1 %  Platelet Count - Automated : 234 K/uL  Mean Cell Volume : 108.0 fl  Mean Cell Hemoglobin : 36.7 pg  Mean Cell Hemoglobin Concentration : 33.9 gm/dL  Auto Neutrophil # : x  Auto Lymphocyte # : x  Auto Monocyte # : x  Auto Eosinophil # : x  Auto Basophil # : x  Auto Neutrophil % : x  Auto Lymphocyte % : x  Auto Monocyte % : x  Auto Eosinophil % : x  Auto Basophil % : x    05-25    141  |  106  |  15  ----------------------------<  81  4.1   |  32<H>  |  0.98    Ca    9.0      25 May 2021 04:07  Phos  2.7     05-25  Mg     2.1     05-25    TPro  6.3  /  Alb  2.9<L>  /  TBili  1.0  /  DBili  x   /  AST  29  /  ALT  37  /  AlkPhos  70  05-25            RADIOLOGY & ADDITIONAL STUDIES REVIEWED:  ***    GOALS OF CARE DISCUSSION WITH PATIENT/FAMILY/PROXY:    CRITICAL CARE TIME SPENT: 35 minutes

## 2021-05-25 NOTE — BH CONSULTATION LIAISON PROGRESS NOTE - NSBHCONSULTRECOMMENDOTHER_PSY_A_CORE FT
1. C/w Klonopin to 1 mg q8h standing (no PRNs), with plan to further taper as tolerated  2. C/w Seroquel 50 mg BID standing  3. Continue delirium precautions: Frequent reorientation, familiar pictures and objects at bedside, natural light in daytime, consistent sleep/wake schedule, adequate hydration and nutrition, sensory aids (hearing aids, glasses) present if needed, minimizing noise and overstimulation, clustering care to minimize overnight interruptions, judicious use of deliriogenic medications (anticholinergics, benzodiazepines and opioid analgesics), minimize use of restraints  4. Medical management as directed by primary team  5. Psychiatry will continue to follow  6. Case d/w Dr. Zapien of ICU team    Paloma Cali MD  Director, Consultation-Liaison Psychiatry Service  [id number]

## 2021-05-26 LAB
ALBUMIN SERPL ELPH-MCNC: 3 G/DL — LOW (ref 3.5–5)
ALP SERPL-CCNC: 75 U/L — SIGNIFICANT CHANGE UP (ref 40–120)
ALT FLD-CCNC: 40 U/L DA — SIGNIFICANT CHANGE UP (ref 10–60)
ANION GAP SERPL CALC-SCNC: 5 MMOL/L — SIGNIFICANT CHANGE UP (ref 5–17)
AST SERPL-CCNC: 29 U/L — SIGNIFICANT CHANGE UP (ref 10–40)
BASE EXCESS BLDA CALC-SCNC: 8 MMOL/L — HIGH (ref -2–3)
BILIRUB SERPL-MCNC: 1 MG/DL — SIGNIFICANT CHANGE UP (ref 0.2–1.2)
BLOOD GAS COMMENTS ARTERIAL: SIGNIFICANT CHANGE UP
BUN SERPL-MCNC: 13 MG/DL — SIGNIFICANT CHANGE UP (ref 7–18)
CALCIUM SERPL-MCNC: 9.2 MG/DL — SIGNIFICANT CHANGE UP (ref 8.4–10.5)
CHLORIDE SERPL-SCNC: 104 MMOL/L — SIGNIFICANT CHANGE UP (ref 96–108)
CO2 SERPL-SCNC: 33 MMOL/L — HIGH (ref 22–31)
CREAT SERPL-MCNC: 0.92 MG/DL — SIGNIFICANT CHANGE UP (ref 0.5–1.3)
GLUCOSE BLDC GLUCOMTR-MCNC: 79 MG/DL — SIGNIFICANT CHANGE UP (ref 70–99)
GLUCOSE BLDC GLUCOMTR-MCNC: 84 MG/DL — SIGNIFICANT CHANGE UP (ref 70–99)
GLUCOSE BLDC GLUCOMTR-MCNC: 88 MG/DL — SIGNIFICANT CHANGE UP (ref 70–99)
GLUCOSE SERPL-MCNC: 76 MG/DL — SIGNIFICANT CHANGE UP (ref 70–99)
HCO3 BLDA-SCNC: 33 MMOL/L — HIGH (ref 21–28)
HCT VFR BLD CALC: 27.9 % — LOW (ref 39–50)
HGB BLD-MCNC: 8.8 G/DL — LOW (ref 13–17)
HOROWITZ INDEX BLDA+IHG-RTO: 40 — SIGNIFICANT CHANGE UP
MAGNESIUM SERPL-MCNC: 2.1 MG/DL — SIGNIFICANT CHANGE UP (ref 1.6–2.6)
MCHC RBC-ENTMCNC: 31.5 GM/DL — LOW (ref 32–36)
MCHC RBC-ENTMCNC: 32 PG — SIGNIFICANT CHANGE UP (ref 27–34)
MCV RBC AUTO: 101.5 FL — HIGH (ref 80–100)
NRBC # BLD: 0 /100 WBCS — SIGNIFICANT CHANGE UP (ref 0–0)
PCO2 BLDA: 45 MMHG — SIGNIFICANT CHANGE UP (ref 35–48)
PH BLDA: 7.47 — HIGH (ref 7.35–7.45)
PHOSPHATE SERPL-MCNC: 2.7 MG/DL — SIGNIFICANT CHANGE UP (ref 2.5–4.5)
PLATELET # BLD AUTO: 251 K/UL — SIGNIFICANT CHANGE UP (ref 150–400)
PO2 BLDA: 104 MMHG — SIGNIFICANT CHANGE UP (ref 83–108)
POTASSIUM SERPL-MCNC: 3.3 MMOL/L — LOW (ref 3.5–5.3)
POTASSIUM SERPL-SCNC: 3.3 MMOL/L — LOW (ref 3.5–5.3)
PROT SERPL-MCNC: 6.4 G/DL — SIGNIFICANT CHANGE UP (ref 6–8.3)
RBC # BLD: 2.75 M/UL — LOW (ref 4.2–5.8)
RBC # FLD: 16.5 % — HIGH (ref 10.3–14.5)
SAO2 % BLDA: 100 % — SIGNIFICANT CHANGE UP
SODIUM SERPL-SCNC: 142 MMOL/L — SIGNIFICANT CHANGE UP (ref 135–145)
WBC # BLD: 7.26 K/UL — SIGNIFICANT CHANGE UP (ref 3.8–10.5)
WBC # FLD AUTO: 7.26 K/UL — SIGNIFICANT CHANGE UP (ref 3.8–10.5)

## 2021-05-26 PROCEDURE — 71045 X-RAY EXAM CHEST 1 VIEW: CPT | Mod: 26,77

## 2021-05-26 PROCEDURE — 74018 RADEX ABDOMEN 1 VIEW: CPT | Mod: 26

## 2021-05-26 PROCEDURE — 71045 X-RAY EXAM CHEST 1 VIEW: CPT | Mod: 26

## 2021-05-26 RX ORDER — LABETALOL HCL 100 MG
100 TABLET ORAL
Refills: 0 | Status: DISCONTINUED | OUTPATIENT
Start: 2021-05-26 | End: 2021-05-28

## 2021-05-26 RX ORDER — CHOLECALCIFEROL (VITAMIN D3) 125 MCG
1000 CAPSULE ORAL DAILY
Refills: 0 | Status: DISCONTINUED | OUTPATIENT
Start: 2021-05-26 | End: 2021-06-11

## 2021-05-26 RX ORDER — POTASSIUM CHLORIDE 20 MEQ
10 PACKET (EA) ORAL
Refills: 0 | Status: COMPLETED | OUTPATIENT
Start: 2021-05-26 | End: 2021-05-26

## 2021-05-26 RX ORDER — ACETAMINOPHEN 500 MG
1000 TABLET ORAL ONCE
Refills: 0 | Status: COMPLETED | OUTPATIENT
Start: 2021-05-26 | End: 2021-05-26

## 2021-05-26 RX ORDER — ONDANSETRON 8 MG/1
4 TABLET, FILM COATED ORAL ONCE
Refills: 0 | Status: COMPLETED | OUTPATIENT
Start: 2021-05-26 | End: 2021-05-26

## 2021-05-26 RX ORDER — SODIUM CHLORIDE 9 MG/ML
1000 INJECTION INTRAMUSCULAR; INTRAVENOUS; SUBCUTANEOUS
Refills: 0 | Status: DISCONTINUED | OUTPATIENT
Start: 2021-05-26 | End: 2021-05-28

## 2021-05-26 RX ORDER — LABETALOL HCL 100 MG
100 TABLET ORAL
Refills: 0 | Status: DISCONTINUED | OUTPATIENT
Start: 2021-05-26 | End: 2021-05-26

## 2021-05-26 RX ADMIN — Medication 100 MILLIEQUIVALENT(S): at 09:26

## 2021-05-26 RX ADMIN — Medication 2 MILLIGRAM(S): at 21:03

## 2021-05-26 RX ADMIN — Medication 100 MILLIGRAM(S): at 09:56

## 2021-05-26 RX ADMIN — LACTULOSE 10 GRAM(S): 10 SOLUTION ORAL at 12:29

## 2021-05-26 RX ADMIN — NALOXEGOL OXALATE 25 MILLIGRAM(S): 12.5 TABLET, FILM COATED ORAL at 12:29

## 2021-05-26 RX ADMIN — Medication 10 MILLIGRAM(S): at 12:27

## 2021-05-26 RX ADMIN — PANTOPRAZOLE SODIUM 40 MILLIGRAM(S): 20 TABLET, DELAYED RELEASE ORAL at 12:27

## 2021-05-26 RX ADMIN — Medication 1 MILLIGRAM(S): at 05:31

## 2021-05-26 RX ADMIN — METHADONE HYDROCHLORIDE 5 MILLIGRAM(S): 40 TABLET ORAL at 05:28

## 2021-05-26 RX ADMIN — Medication 20 MILLIGRAM(S): at 05:28

## 2021-05-26 RX ADMIN — Medication 1000 MILLIGRAM(S): at 15:36

## 2021-05-26 RX ADMIN — Medication 1000 UNIT(S): at 12:28

## 2021-05-26 RX ADMIN — Medication 160 MILLIGRAM(S): at 05:28

## 2021-05-26 RX ADMIN — Medication 1000 MILLIGRAM(S): at 12:29

## 2021-05-26 RX ADMIN — Medication 1 TABLET(S): at 12:27

## 2021-05-26 RX ADMIN — ENOXAPARIN SODIUM 40 MILLIGRAM(S): 100 INJECTION SUBCUTANEOUS at 12:34

## 2021-05-26 RX ADMIN — GABAPENTIN 100 MILLIGRAM(S): 400 CAPSULE ORAL at 13:41

## 2021-05-26 RX ADMIN — CHLORHEXIDINE GLUCONATE 1 APPLICATION(S): 213 SOLUTION TOPICAL at 05:28

## 2021-05-26 RX ADMIN — Medication 1 MILLIGRAM(S): at 13:41

## 2021-05-26 RX ADMIN — Medication 1 APPLICATION(S): at 17:35

## 2021-05-26 RX ADMIN — Medication 100 MILLIEQUIVALENT(S): at 08:26

## 2021-05-26 RX ADMIN — GABAPENTIN 100 MILLIGRAM(S): 400 CAPSULE ORAL at 05:28

## 2021-05-26 RX ADMIN — QUETIAPINE FUMARATE 50 MILLIGRAM(S): 200 TABLET, FILM COATED ORAL at 05:28

## 2021-05-26 RX ADMIN — ONDANSETRON 4 MILLIGRAM(S): 8 TABLET, FILM COATED ORAL at 22:54

## 2021-05-26 RX ADMIN — SODIUM CHLORIDE 75 MILLILITER(S): 9 INJECTION INTRAMUSCULAR; INTRAVENOUS; SUBCUTANEOUS at 07:28

## 2021-05-26 RX ADMIN — ONDANSETRON 4 MILLIGRAM(S): 8 TABLET, FILM COATED ORAL at 17:35

## 2021-05-26 RX ADMIN — Medication 100 MILLIEQUIVALENT(S): at 06:42

## 2021-05-26 RX ADMIN — Medication 400 MILLIGRAM(S): at 15:21

## 2021-05-26 RX ADMIN — Medication 1 APPLICATION(S): at 05:28

## 2021-05-26 NOTE — PHYSICAL THERAPY INITIAL EVALUATION ADULT - PATIENT PROFILE REVIEW, REHAB EVAL
including labs and imaging. Case discussed with MD. This is a RE-EVALUATION/yes
2 week RE-EVAL. EMR, Laboratory and Radiology results reviewed
including labs and imaging. This is a RE-EVALUATION/yes
including labs and imaging. Case discussed with MDs. This is a RE-EVALUATION/yes
including labs and imaging/yes

## 2021-05-26 NOTE — PROGRESS NOTE ADULT - ATTENDING COMMENTS
55 yr old  man , non smoker with  moody 1990s presented 3/14 with x9 days worsening cough, subjective fevers, and SOB, with x2-3 days dysuria and central, non-radiating, constant CP. Admitted to medicine unit  for acute hypoxic respiratory failure secondary to pna from covid-19 infection .     Assessment:  1. Acute hypoxic respiratory failure  2. Covid-19 infection   3. Transaminitis  4. Prediabetes  5. Bilateral pneumothorax  6. Septic shock - resolved  7. Anemia  8. bowel obstruction/ileus    Plan   -Taper methadone as tolerated  -Chest tube to water seal this morning  -Repeat CXR in afternoon  -S/p tracheostomy placement 4/21   -S/p G-tube 4/23  -Cont. mechanical ventilation   -Will trial trach collar during day time, full vent support overnight  -PT evaluation, upright positioning as tolerated  -On antibiotics again will complete 5 days   -Tapering dose of steroids as ordered, bactrim for PCP prophylaxis   -C-diff PCR negative  -Cont. movantik,  -Add antihypertensive medications  -Cont. tube feedings as seems to be tolerating and ileus is resolving  -dvt/gi prophy  -hemodynamic monitoring   -Prognosis is guarded.

## 2021-05-26 NOTE — PHYSICAL THERAPY INITIAL EVALUATION ADULT - PHYSICAL ASSIST/NONPHYSICAL ASSIST: SUPINE/SIT, REHAB EVAL
verbal cues/nonverbal cues (demo/gestures)/1 person assist Spo2 remained >94%, HR 120s Max, RR low 40s; with recover to baseline in ~2 minutes supine in bed/verbal cues/nonverbal cues (demo/gestures)/1 person assist

## 2021-05-26 NOTE — PROGRESS NOTE ADULT - PROBLEM SELECTOR PLAN 1
isolation precautions DC  Cont mechanical ventilation - S.P trach.   Wean as tolerated  SBT daily as tolerated  Tracheal care  Decubitus prevention  Supplemental nutrition  ICU management.   Monitor oxygen sat  Monitor LFT, LDH, CRP, D-Dimer, Ferritin and procalcitonin  Vit C, D and zinc supp  Montelukast 10 mgs po Qhs  Daily CXR   G-tube with feeds  Replace lytes  Pulmonary toilet  DVT and GI PPX.

## 2021-05-26 NOTE — PHYSICAL THERAPY INITIAL EVALUATION ADULT - LEVEL OF INDEPENDENCE: GAIT, REHAB EVAL
TBA as arousal attention and ability to follow commands improve
TBA as gross strength and ability to follow commands improve.
TBA as arousal attention and ability to follow commands improve
TBA as increased levels of activity tolerated; would be mechanical lift

## 2021-05-26 NOTE — PHYSICAL THERAPY INITIAL EVALUATION ADULT - ORIENTATION, REHAB EVAL
oriented to person, place, time and situation
oriented to person, place, time and situation
unable to assess
unable to assess
person

## 2021-05-26 NOTE — PHYSICAL THERAPY INITIAL EVALUATION ADULT - REFERRAL TO ANOTHER SERVICE NEEDED, PT EVAL
neurology/occupational therapy/respiratory therapy
palliative/respiratory therapy
occupational therapy/respiratory therapy/speech language pathology
palliative/respiratory therapy
respiratory therapy

## 2021-05-26 NOTE — PHYSICAL THERAPY INITIAL EVALUATION ADULT - ASR WT BEARING STATUS EVAL
no weight-bearing restrictions

## 2021-05-26 NOTE — PROGRESS NOTE ADULT - ASSESSMENT
Assessment and plan: 56 y/o M with no PMH is admitted to ICU for AHRF 2/2 covid19 pna     1. Acute hypoxic respiratory failure  2. ARDS 2/2 Covid pneumonia  3. Pneumothorax  4. Prediabetes  5. Abnormal TSH     Neuro  -Alert and oriented x 3 at baseline   -Off all drips, and calm  -C/w Klonopin to 1mg q8h  -C/w Seroquel 75 mg BID   -Decreased methadone to 10/5/10  - d/c Ativan PRN, fentanyl PRN  -CT head unremarkable   -Low concern for malignant hypothermia, NMS, or serotonin syndrome given waxing/waning and lack of inciting medications    Cardiovascular  # Shock   now off pressors   Clonidine was d/c    Hypertension:  BP was found to be consistently elevated  Was started on labetalol 100mg bid     Pulm  #Acute hypoxic respiratory failure: secondary to Covid19 pneumonia  #ARDS  -Was on HFNC then got intubated 3/29  -Trach 4/22 continues on Vent   - Remdesivir was discontinued due to positive antibodies   - Finished Dexamethasone   - Covid19 PCR negative now, off isolation   - Daily SBT  Tolerated well today, will put on trach collar  Full vent support overnight    # Bacterial Pneumonia  - Stable infiltrate on CXR ,likely developed Bacterial pneumonia   - Completed ABx Zosyn, meropenem, s/p 1 dose of Vancomycin  - MRSA negative from 3/26, 5/18  - Sputum Cx growing few gram negative rods, CRE - Contact isolation  - Taper prednisone to 30 daily (5/11) for possible organizing pneumonia   - C/w bactrim empirically, TIW for PCP PPX  - Secretions from the trach, needs frequent suctions   - Pulm. Dr. Ryan  - ID Dr Anand   - Was found to have fevers 5/17  Was started on vanc and zosyn  MRSA is negative, will d/c vanc  zosyn now dc     #Pneumothorax and pneumomediastinum:   -Thoracic surgery following  -s/p Chest tube placed 4/8 pigtail to wall suction and d/c on 4/15  -On 4/18 chest tube replaced for recurrence of PTX- c/w chest tube to suction     -anterior chest tube placed 5/6 for worsening pneumothorax with resolution of PTX  5/10 CXR with recurrence of apical pneumo - tube adjusted with improvement however still persisting but stable  -Repeat Chest CT shows persistent PTX  -Water seal inferior CT -PTX was increased, readjusted tube as it was kinked.  - Lateral Ct was removed, repeat CXr showed developing of PTX in left lower lobe, resolving slowly  -Surgery was informed, following the case closely  - 5/23:  place CT to waterseal  5/24 reformation of PTX  5/24 CT to suction, CXR improvement  5/26 CT placed on water seal, will repeat CXR in PM      ID  Likely bacterial pneumonia   -leukocytosis improved 9k  -Febrile 100.4 5/18 - last episode  Was started on vanc and zosyn  MRSA is negative, will d/c vanc  dc zosyn 5/22 s/p 5 days of zosyn    Nephro  -Pitting edema to both arms, ecchymosis on right AC, blisters on left arm around brachial area    -Was retaining urine, andrea was placed 5/5  -DC doxazosin for borderline BPs  potassium and phosphorus replaced this morning; monitor electrolytes     JARRED:  Patient has elevated creatinine compared to baseline, creatinine clearance is trending up.  Will start on mild IV hydration  ua shows proteinuria, triple phosphate crystals, large blood >50 rbc  FENA 6.6%, post obstructive  Monitor BMP    GI  Transaminitis:   likely secondary to Covid19, Resolved   hepatitis panel -ve  -continue to monitor LFT    Ileus  Abd xray with ileus, CT showing the same 5/15  Restart trickle feeds  G Tube off suction now  Rectal tube in place  C/w Miralax BID standing and reglan   G tube 4/23, - dressing changed daily for seroma  Patient was found to have ileus again on 5/20  Was started on lactulose suppository  slowly start on tube feeds  Cdiff negative  Start movantik    Heme  Elevated d-dimer: likely secondary to Covid19   -D-dimer 423 on admission  -Last D-dimer 1434  - No active bleeding, restarted lovenox daily    Anemia  S/p 4 PRBC total  - Now Hg stable 7-9  CTH and CT abdomen w/o contrast no evidence of bleed    No active signs of bleeding (No hematemesis, melena or hematuria)  Hemolysis w/u- negative  Iron studies show ACD  Dr Andrade on board   F/u CBC daily     Endocrine  Prediabetes:  -A1c 5.8  -BS controlled  -continue HSS    Abnormal TSH:  -TSH level noted 0.26,   -Repeat TSH 0.37 and Free T4 1.82    Skin/Catheters  No rashes. Peripheral IV lines.   Both arms with edema, right AC with ecchymosis  doppler of LUE was negative    Prophylaxis   On Lovenox for DVT   Protonix for GI proph    GOC  FULL CODE

## 2021-05-26 NOTE — PHYSICAL THERAPY INITIAL EVALUATION ADULT - DISCHARGE DISPOSITION, PT EVAL
Inpatient Rehab Facility/rehabilitation facility Inpatient Rehab Facility. [facility] vs [facility], will depend on activity tolerance and performance progress/rehabilitation facility

## 2021-05-26 NOTE — PHYSICAL THERAPY INITIAL EVALUATION ADULT - SKIN COLOR/CHARACTERISTICS
Edema and scabbing on dorsal surface of the L hand/bruised (ecchymotic)
at exposed areas
at exposed areas, lt chest bandages c/d/i. BUE pitting edema L>R.
at exposed areas
BUE edema LUE>>RUE. ecchymoses and multiple skin tears LUE

## 2021-05-26 NOTE — PHYSICAL THERAPY INITIAL EVALUATION ADULT - LEVEL OF INDEPENDENCE: SIT/STAND, REHAB EVAL
independent
TBA as arousal attention and ability to follow commands improve
TBA as arousal attention and ability to follow commands improve
TBA as gross strength and ability to follow commands improve.
TBA as increased levels of activity tolerated; would be mechanical lift

## 2021-05-26 NOTE — PHYSICAL THERAPY INITIAL EVALUATION ADULT - MANUAL MUSCLE TESTING RESULTS, REHAB EVAL
except b/l hips 4/5/no strength deficits were identified
0/5 throughout
UE: shld flex 3+/5, elbow 3+/5. LE: hip 2+/5, knee flexion 3-/5, ankle L 1/5 R 2-/5,
0/5 throughout
1/5 throughout, except 2-/5 wrist and hand and ankle and feet. Able small motions heads, r/l, u/d

## 2021-05-26 NOTE — PHYSICAL THERAPY INITIAL EVALUATION ADULT - PHYSICAL ASSIST/NONPHYSICAL ASSIST, REHAB EVAL
verbal cues/nonverbal cues (demo/gestures)/1 person assist
verbal cues/nonverbal cues (demo/gestures)/1 person assist
1 person assist
1 person assist

## 2021-05-26 NOTE — PHYSICAL THERAPY INITIAL EVALUATION ADULT - PASSIVE RANGE OF MOTION EXAMINATION, REHAB EVAL
bilateral upper extremity Passive ROM was WFL (within functional limits)/bilateral lower extremity Passive ROM was WFL (within functional limits)
ankles ~neutral/bilateral upper extremity Passive ROM was WFL (within functional limits)/bilateral lower extremity Passive ROM was WFL (within functional limits)

## 2021-05-26 NOTE — PHYSICAL THERAPY INITIAL EVALUATION ADULT - IMPAIRMENTS FOUND, PT EVAL
aerobic capacity/endurance/gait, locomotion, and balance/integumentary integrity/muscle strength/posture/ROM/ventilation and respiration/gas exchange aerobic capacity/endurance/cognitive impairment/gait, locomotion, and balance/integumentary integrity/muscle strength/posture/ROM/ventilation and respiration/gas exchange

## 2021-05-26 NOTE — PHYSICAL THERAPY INITIAL EVALUATION ADULT - THERAPY FREQUENCY, PT EVAL
2-3x/week
3-5x/week
at this time; will update if pt can improve ability to particiapte/1-2x/week
2-3x/week
5-7x/week

## 2021-05-26 NOTE — PHYSICAL THERAPY INITIAL EVALUATION ADULT - ACTIVE RANGE OF MOTION EXAMINATION, REHAB EVAL
shoulder 0-100deg, elbow flexion 0-110deg, L ankle DF 0 deg, R ankle DF 5 deg/deficits as listed below Except Shoulders 0-~100deg flx, b/ hips ~1/4 range, knees ~1/3 range, L ankle DF ~0 deg, R ankle DF 5 deg/bilateral upper extremity Active ROM was WFL (within functional limits)/deficits as listed below

## 2021-05-26 NOTE — PHYSICAL THERAPY INITIAL EVALUATION ADULT - DIAGNOSIS, PT EVAL
decrease aerobic capacity/endurance, impaired balance, decrease ROM, weakness Decrease aerobic capacity/endurance, impaired balance, decrease ROM, weakness

## 2021-05-26 NOTE — PHYSICAL THERAPY INITIAL EVALUATION ADULT - PLANNED THERAPY INTERVENTIONS, PT EVAL
balance training/bed mobility training/gait training/postural re-education/ROM/strengthening/stretching/transfer training
balance training/bed mobility training/gait training/neuromuscular re-education/postural re-education/ROM/strengthening/transfer training/wheelchair management/propulsion training
balance training/bed mobility training/gait training/neuromuscular re-education/postural re-education/ROM/strengthening/transfer training/wheelchair management/propulsion training
aerobic capacity/endurance training/balance training/gait training/strengthening

## 2021-05-26 NOTE — PHYSICAL THERAPY INITIAL EVALUATION ADULT - BED MOBILITY LIMITATIONS, REHAB EVAL
decreased ability to use arms for pushing/pulling/decreased ability to use legs for bridging/pushing/impaired ability to control trunk for mobility

## 2021-05-26 NOTE — PHYSICAL THERAPY INITIAL EVALUATION ADULT - STANDING BALANCE: DYNAMIC, REHAB EVAL
TBA as arousal attention and ability to follow commands improve
TBA as arousal attention and ability to follow commands improve
good balance
TBA as gross strength and ability to follow commands improve.
TBA as increased levels of activity tolerated; would be mechanical lift

## 2021-05-26 NOTE — PHYSICAL THERAPY INITIAL EVALUATION ADULT - REHAB POTENTIAL, PT EVAL
good, to achieve stated therapy goals
pend medical imprv./fair, will monitor progress closely
pend medical imprv./poor

## 2021-05-26 NOTE — PHYSICAL THERAPY INITIAL EVALUATION ADULT - BALANCE DISTURBANCE, IDENTIFIED IMPAIRMENT CONTRIBUTE, REHAB EVAL
Detail Level: Detailed
Detail Level: Generalized
impaired coordination/impaired motor control/impaired postural control/decreased ROM/decreased strength
OCONNELL/decreased strength
abnormal muscle tone/pain/impaired postural control/decreased ROM/decreased strength

## 2021-05-26 NOTE — PROGRESS NOTE ADULT - SUBJECTIVE AND OBJECTIVE BOX
INTERVAL HPI/OVERNIGHT EVENTS: BP was elevated, was started on labetalol. d/c zully TOJAXSON. CT placed to suction.    PRESSORS: [ ] YES [ ] NO  WHICH:    ANTIBIOTICS:                      Antimicrobial:  trimethoprim  40 mG/sulfamethoxazole 200 mG Suspension 160 milliGRAM(s) Oral <User Schedule>    Cardiovascular:  labetalol 100 milliGRAM(s) Enteral Tube two times a day    Pulmonary:  ALBUTerol    90 MICROgram(s) HFA Inhaler 2 Puff(s) Inhalation every 6 hours PRN    Hematalogic:  enoxaparin Injectable 40 milliGRAM(s) SubCutaneous every 24 hours    Other:  acetaminophen    Suspension .. 650 milliGRAM(s) Oral every 12 hours PRN  artificial  tears Solution 1 Drop(s) Both EYES every 4 hours PRN  ascorbic acid 1000 milliGRAM(s) Oral daily  BACItracin   Ointment 1 Application(s) Topical two times a day  bisacodyl Suppository 10 milliGRAM(s) Rectal daily  calcium carbonate 1250 mG  + Vitamin D (OsCal 500 + D) 1 Tablet(s) Oral daily  chlorhexidine 2% Cloths 1 Application(s) Topical <User Schedule>  cholecalciferol 1000 Unit(s) Oral daily  clonazePAM  Tablet 1 milliGRAM(s) Oral every 8 hours  gabapentin 100 milliGRAM(s) Oral every 8 hours  lactulose Syrup 10 Gram(s) Oral daily  methadone    Tablet 5 milliGRAM(s) Oral every 12 hours  naloxegol 25 milliGRAM(s) Oral daily  pantoprazole   Suspension 40 milliGRAM(s) Oral daily  predniSONE   Tablet 20 milliGRAM(s) Oral daily  QUEtiapine 50 milliGRAM(s) Oral two times a day  sodium chloride 0.9% lock flush 10 milliLiter(s) IV Push every 1 hour PRN  sodium chloride 0.9%. 1000 milliLiter(s) IV Continuous <Continuous>    acetaminophen    Suspension .. 650 milliGRAM(s) Oral every 12 hours PRN  ALBUTerol    90 MICROgram(s) HFA Inhaler 2 Puff(s) Inhalation every 6 hours PRN  artificial  tears Solution 1 Drop(s) Both EYES every 4 hours PRN  ascorbic acid 1000 milliGRAM(s) Oral daily  BACItracin   Ointment 1 Application(s) Topical two times a day  bisacodyl Suppository 10 milliGRAM(s) Rectal daily  calcium carbonate 1250 mG  + Vitamin D (OsCal 500 + D) 1 Tablet(s) Oral daily  chlorhexidine 2% Cloths 1 Application(s) Topical <User Schedule>  cholecalciferol 1000 Unit(s) Oral daily  clonazePAM  Tablet 1 milliGRAM(s) Oral every 8 hours  enoxaparin Injectable 40 milliGRAM(s) SubCutaneous every 24 hours  gabapentin 100 milliGRAM(s) Oral every 8 hours  labetalol 100 milliGRAM(s) Enteral Tube two times a day  lactulose Syrup 10 Gram(s) Oral daily  methadone    Tablet 5 milliGRAM(s) Oral every 12 hours  naloxegol 25 milliGRAM(s) Oral daily  pantoprazole   Suspension 40 milliGRAM(s) Oral daily  predniSONE   Tablet 20 milliGRAM(s) Oral daily  QUEtiapine 50 milliGRAM(s) Oral two times a day  sodium chloride 0.9% lock flush 10 milliLiter(s) IV Push every 1 hour PRN  sodium chloride 0.9%. 1000 milliLiter(s) IV Continuous <Continuous>  trimethoprim  40 mG/sulfamethoxazole 200 mG Suspension 160 milliGRAM(s) Oral <User Schedule>    Drug Dosing Weight  Height (cm): 167.6 (23 Apr 2021 23:15)  Weight (kg): 83.9 (23 Apr 2021 23:15)  BMI (kg/m2): 29.9 (23 Apr 2021 23:15)  BSA (m2): 1.93 (23 Apr 2021 23:15)    CENTRAL LINE: [ ] YES [ ] NO  LOCATION:   DATE INSERTED:  REMOVE: [ ] YES [ ] NO  EXPLAIN:    RIVERA: [ ] YES [ ] NO    DATE INSERTED:  REMOVE:  [ ] YES [ ] NO  EXPLAIN:    A-LINE:  [ ] YES [ ] NO  LOCATION:   DATE INSERTED:  REMOVE:  [ ] YES [ ] NO  EXPLAIN:    PMH -reviewed admission note, no change since admission    ICU Vital Signs Last 24 Hrs  T(C): 37.1 (26 May 2021 00:00), Max: 37.3 (25 May 2021 19:00)  T(F): 98.8 (26 May 2021 00:00), Max: 99.1 (25 May 2021 19:00)  HR: 122 (26 May 2021 09:00) (68 - 130)  BP: 155/86 (26 May 2021 09:00) (116/66 - 175/94)  BP(mean): 100 (26 May 2021 09:00) (77 - 114)  ABP: --  ABP(mean): --  RR: 34 (26 May 2021 09:00) (20 - 40)  SpO2: 98% (26 May 2021 09:00) (83% - 100%)      ABG - ( 26 May 2021 05:07 )  pH, Arterial: 7.47  pH, Blood: x     /  pCO2: 45    /  pO2: 104   / HCO3: 33    / Base Excess: 8.0   /  SaO2: 100                   05-25 @ 07:01  -  05-26 @ 07:00  --------------------------------------------------------  IN: 290 mL / OUT: 1780 mL / NET: -1490 mL        Mode: standby      PHYSICAL EXAM:  GENERAL: nad, on trach collar  HEAD:  Atraumatic, Normocephalic  EYES: EOMI, PERRLA, conjunctiva and sclera clear  ENMT: No tonsillar erythema, exudates, or enlargement; Moist mucous membranes, Good dentition, No lesions  NECK: Supple, normal appearance, No JVD; Normal thyroid; Trachea midline  NERVOUS SYSTEM:  Alert & Oriented, obeying commands  CHEST/LUNG: No chest deformity; Normal percussion bilaterally; No rales, rhonchi, wheezing   HEART: Regular rate and rhythm; No murmurs, rubs, or gallops  ABDOMEN: Soft, Nontender, Nondistended; Bowel sounds present  EXTREMITIES:  2+ Peripheral Pulses, No clubbing, cyanosis, or edema  LYMPH: No lymphadenopathy noted  SKIN: No rashes or lesions; Good capillary refill      LABS:  CBC Full  -  ( 26 May 2021 04:23 )  WBC Count : 7.26 K/uL  RBC Count : 2.75 M/uL  Hemoglobin : 8.8 g/dL  Hematocrit : 27.9 %  Platelet Count - Automated : 251 K/uL  Mean Cell Volume : 101.5 fl  Mean Cell Hemoglobin : 32.0 pg  Mean Cell Hemoglobin Concentration : 31.5 gm/dL  Auto Neutrophil # : x  Auto Lymphocyte # : x  Auto Monocyte # : x  Auto Eosinophil # : x  Auto Basophil # : x  Auto Neutrophil % : x  Auto Lymphocyte % : x  Auto Monocyte % : x  Auto Eosinophil % : x  Auto Basophil % : x    05-26    142  |  104  |  13  ----------------------------<  76  3.3<L>   |  33<H>  |  0.92    Ca    9.2      26 May 2021 04:23  Phos  2.7     05-26  Mg     2.1     05-26    TPro  6.4  /  Alb  3.0<L>  /  TBili  1.0  /  DBili  x   /  AST  29  /  ALT  40  /  AlkPhos  75  05-26            RADIOLOGY & ADDITIONAL STUDIES REVIEWED:  ***    GOALS OF CARE DISCUSSION WITH PATIENT/FAMILY/PROXY:    CRITICAL CARE TIME SPENT: 35 minutes

## 2021-05-26 NOTE — PHYSICAL THERAPY INITIAL EVALUATION ADULT - LIVES WITH, PROFILE
basement apartment, 5 stair with rail to access/alone

## 2021-05-26 NOTE — PHYSICAL THERAPY INITIAL EVALUATION ADULT - BALANCE DISTURBANCE, SYSTEM IMPAIRMENT CONTRIBUTE, REHAB EVAL
cognitive/neuromuscular/musculoskeletal
pulmonary/musculoskeletal
ESRD (end stage renal disease)
neuromuscular/musculoskeletal

## 2021-05-26 NOTE — PHYSICAL THERAPY INITIAL EVALUATION ADULT - PRECAUTIONS/LIMITATIONS, REHAB EVAL
fall precautions/oxygen therapy device and L/min
aspiration precautions/cardiac precautions/fall precautions/oxygen therapy device and L/min/swallowing precautions
contact/fall precautions/oxygen therapy device and L/min
Airborne/Contact Precautions./fall precautions/oxygen therapy device and L/min
oxygen therapy device and L/min

## 2021-05-26 NOTE — PHYSICAL THERAPY INITIAL EVALUATION ADULT - RISK REDUCTION/PREVENTION, PT EVAL
risk factors/secondary impairments
risk factors/recurrence of condition/secondary impairments
secondary impairments

## 2021-05-26 NOTE — PHYSICAL THERAPY INITIAL EVALUATION ADULT - PERTINENT HX OF CURRENT PROBLEM, REHAB EVAL
Pt in hospital for Hypoxemia 2nd to COVID, transaminitis. Pt recently taken off mechanical ventilation and is now on supplemental O2 5L to vent. Hypoxemia 2nd to COVID, transaminitis. Intubation -->trah now off MV and supplemental O2 5L to trach.

## 2021-05-26 NOTE — PROGRESS NOTE ADULT - SUBJECTIVE AND OBJECTIVE BOX
Patient is a 55y old  Male who presents with a chief complaint of SOB (26 May 2021 10:37)  Awake, alert, laying in bed in NAD. Ventilator dependent via trach  INTERVAL HPI/OVERNIGHT EVENTS:      VITAL SIGNS:  T(F): 98.8 (05-26-21 @ 00:00)  HR: 117 (05-26-21 @ 10:00)  BP: 145/88 (05-26-21 @ 10:00)  RR: 30 (05-26-21 @ 10:00)  SpO2: 98% (05-26-21 @ 09:00)  Wt(kg): --  I&O's Detail    25 May 2021 07:01  -  26 May 2021 07:00  --------------------------------------------------------  IN:    Enteral Tube Flush: 100 mL    Jevity 1.5: 190 mL  Total IN: 290 mL    OUT:    Chest Tube (mL): 40 mL    Indwelling Catheter - Urethral (mL): 1740 mL  Total OUT: 1780 mL    Total NET: -1490 mL        Mode: standby        REVIEW OF SYSTEMS:    CONSTITUTIONAL:  No fevers, chills, sweats    HEENT:  Eyes:  No diplopia or blurred vision. ENT:  No earache, sore throat or runny nose.    CARDIOVASCULAR:  No pressure, squeezing, tightness, or heaviness about the chest; no palpitations.    RESPIRATORY:  Per HPI    GASTROINTESTINAL:  No abdominal pain, nausea, vomiting or diarrhea.    GENITOURINARY:  No dysuria, frequency or urgency.    NEUROLOGIC:  No paresthesias, fasciculations, seizures or weakness.    PSYCHIATRIC:  No disorder of thought or mood.      PHYSICAL EXAM:    Constitutional: Well developed and nourished  Eyes:Perrla  ENMT: normal  Neck:supple  Respiratory: good air entry  Cardiovascular: S1 S2 regular  Gastrointestinal: Soft, Non tender  Extremities: No edema  Vascular:normal  Neurological:Awake, alert,Ox3  Musculoskeletal:Normal      MEDICATIONS  (STANDING):  ascorbic acid 1000 milliGRAM(s) Oral daily  BACItracin   Ointment 1 Application(s) Topical two times a day  bisacodyl Suppository 10 milliGRAM(s) Rectal daily  calcium carbonate 1250 mG  + Vitamin D (OsCal 500 + D) 1 Tablet(s) Oral daily  chlorhexidine 2% Cloths 1 Application(s) Topical <User Schedule>  cholecalciferol 1000 Unit(s) Oral daily  clonazePAM  Tablet 1 milliGRAM(s) Oral every 8 hours  enoxaparin Injectable 40 milliGRAM(s) SubCutaneous every 24 hours  gabapentin 100 milliGRAM(s) Oral every 8 hours  labetalol 100 milliGRAM(s) Enteral Tube two times a day  lactulose Syrup 10 Gram(s) Oral daily  methadone    Tablet 5 milliGRAM(s) Oral every 12 hours  naloxegol 25 milliGRAM(s) Oral daily  pantoprazole   Suspension 40 milliGRAM(s) Oral daily  predniSONE   Tablet 20 milliGRAM(s) Oral daily  QUEtiapine 50 milliGRAM(s) Oral two times a day  sodium chloride 0.9%. 1000 milliLiter(s) (75 mL/Hr) IV Continuous <Continuous>  trimethoprim  40 mG/sulfamethoxazole 200 mG Suspension 160 milliGRAM(s) Oral <User Schedule>    MEDICATIONS  (PRN):  acetaminophen    Suspension .. 650 milliGRAM(s) Oral every 12 hours PRN Temp greater or equal to 38C (100.4F)  ALBUTerol    90 MICROgram(s) HFA Inhaler 2 Puff(s) Inhalation every 6 hours PRN Shortness of Breath and/or Wheezing  artificial  tears Solution 1 Drop(s) Both EYES every 4 hours PRN Dry Eyes  sodium chloride 0.9% lock flush 10 milliLiter(s) IV Push every 1 hour PRN Pre/post blood products, medications, blood draw, and to maintain line patency      Allergies    No Known Allergies    Intolerances        LABS:                        8.8    7.26  )-----------( 251      ( 26 May 2021 04:23 )             27.9     05-26    142  |  104  |  13  ----------------------------<  76  3.3<L>   |  33<H>  |  0.92    Ca    9.2      26 May 2021 04:23  Phos  2.7     05-26  Mg     2.1     05-26    TPro  6.4  /  Alb  3.0<L>  /  TBili  1.0  /  DBili  x   /  AST  29  /  ALT  40  /  AlkPhos  75  05-26        ABG - ( 26 May 2021 05:07 )  pH, Arterial: 7.47  pH, Blood: x     /  pCO2: 45    /  pO2: 104   / HCO3: 33    / Base Excess: 8.0   /  SaO2: 100                   CAPILLARY BLOOD GLUCOSE      POCT Blood Glucose.: 84 mg/dL (26 May 2021 05:54)  POCT Blood Glucose.: 88 mg/dL (26 May 2021 00:30)  POCT Blood Glucose.: 99 mg/dL (25 May 2021 17:49)        RADIOLOGY & ADDITIONAL TESTS:    CXR:  < from: Xray Chest 1 View- PORTABLE-Routine (Xray Chest 1 View- PORTABLE-Routine in AM.) (05.25.21 @ 08:58) >  IMPRESSION:  No pneumothorax.    < end of copied text >    Ct scan chest:    ekg;    echo:

## 2021-05-26 NOTE — PHYSICAL THERAPY INITIAL EVALUATION ADULT - SITTING BALANCE: DYNAMIC
TBA as arousal attention and ability to follow commands improve
poor minus
normal balance
TBA as gross strength and ability to follow commands improve.
TBA as arousal attention and ability to follow commands improve

## 2021-05-26 NOTE — PHYSICAL THERAPY INITIAL EVALUATION ADULT - CRITERIA FOR SKILLED THERAPEUTIC INTERVENTIONS
impairments found/functional limitations in following categories/risk reduction/prevention/rehab potential/therapy frequency/predicted duration of therapy intervention/anticipated equipment needs at discharge/anticipated discharge recommendation impairments found/functional limitations in following categories/risk reduction/prevention/rehab potential/therapy frequency/predicted duration of therapy intervention/anticipated discharge recommendation

## 2021-05-26 NOTE — PHYSICAL THERAPY INITIAL EVALUATION ADULT - FOLLOWS COMMANDS/ANSWERS QUESTIONS, REHAB EVAL
non-verbal/75% of the time/able to follow multistep instructions/able to follow single-step instructions/unable to answer questions mouths words/75% of the time/able to follow single-step instructions/unable to answer questions

## 2021-05-26 NOTE — PROGRESS NOTE ADULT - SUBJECTIVE AND OBJECTIVE BOX
Patient is a 55y old  Male who presents with a chief complaint of SOB (25 May 2021 10:40)    pt seen in icu [ x ], reg med floor [   ], bed [ x ], chair at bedside [   ], awake and responsive [ x ], mildly sedated, [  ],    nad [x  ]        Allergies    No Known Allergies        Vitals    T(F): 98.8 (05-26-21 @ 00:00), Max: 99.1 (05-25-21 @ 19:00)  HR: 76 (05-26-21 @ 06:00) (68 - 130)  BP: 128/75 (05-26-21 @ 06:00) (116/66 - 175/94)  RR: 22 (05-26-21 @ 06:00) (20 - 40)  SpO2: 100% (05-26-21 @ 06:00) (83% - 100%)  Wt(kg): --  CAPILLARY BLOOD GLUCOSE      POCT Blood Glucose.: 84 mg/dL (26 May 2021 05:54)      Labs                          8.8    7.26  )-----------( 251      ( 26 May 2021 04:23 )             27.9       05-26    142  |  104  |  13  ----------------------------<  76  3.3<L>   |  33<H>  |  0.92    Ca    9.2      26 May 2021 04:23  Phos  2.7     05-26  Mg     2.1     05-26    TPro  6.4  /  Alb  3.0<L>  /  TBili  1.0  /  DBili  x   /  AST  29  /  ALT  40  /  AlkPhos  75  05-26            .Sputum Sputum  04-16 @ 04:42   Moderate Enterobacter aerogenes (Carbapenem Resistant)  Normal Respiratory Monserrat present  --  Enterobacter aerogenes (Carbapenem Resistant)      .Urine Clean Catch (Midstream)  03-15 @ 00:52   No growth  --  --          Radiology Results      Meds    MEDICATIONS  (STANDING):  ascorbic acid 1000 milliGRAM(s) Oral daily  BACItracin   Ointment 1 Application(s) Topical two times a day  bisacodyl Suppository 10 milliGRAM(s) Rectal daily  chlorhexidine 2% Cloths 1 Application(s) Topical <User Schedule>  clonazePAM  Tablet 1 milliGRAM(s) Oral every 8 hours  enoxaparin Injectable 40 milliGRAM(s) SubCutaneous every 24 hours  gabapentin 100 milliGRAM(s) Oral every 8 hours  insulin lispro (ADMELOG) corrective regimen sliding scale   SubCutaneous every 6 hours  lactulose Syrup 10 Gram(s) Oral daily  methadone    Tablet 5 milliGRAM(s) Oral every 12 hours  naloxegol 25 milliGRAM(s) Oral daily  pantoprazole   Suspension 40 milliGRAM(s) Oral daily  potassium chloride  10 mEq/100 mL IVPB 10 milliEquivalent(s) IV Intermittent every 1 hour  predniSONE   Tablet 20 milliGRAM(s) Oral daily  QUEtiapine 50 milliGRAM(s) Oral two times a day  trimethoprim  40 mG/sulfamethoxazole 200 mG Suspension 160 milliGRAM(s) Oral <User Schedule>      MEDICATIONS  (PRN):  acetaminophen    Suspension .. 650 milliGRAM(s) Oral every 12 hours PRN Temp greater or equal to 38C (100.4F)  ALBUTerol    90 MICROgram(s) HFA Inhaler 2 Puff(s) Inhalation every 6 hours PRN Shortness of Breath and/or Wheezing  artificial  tears Solution 1 Drop(s) Both EYES every 4 hours PRN Dry Eyes  sodium chloride 0.9% lock flush 10 milliLiter(s) IV Push every 1 hour PRN Pre/post blood products, medications, blood draw, and to maintain line patency      Physical Exam    Neuro :  no focal deficits  Respiratory: CTA B/L  CV: RRR, S1S2, no murmurs,   Abdominal: Soft, NT, ND +BS, gastrostomy tube inplace  Extremities: edema of extrem, + peripheral pulses      ASSESSMENT      Hypoxemia 2nd to covid pna   transaminitis  prediabetes  h/o appendectomy  cholecystectomy        PLAN    cont cont precautions  covid 5/14/21 neg noted above  d/c remdesevir given covid ab positive noted   completed dexamethasone   started pulse steroids for 3 days - 250mg solumedrol bid now tapered off 4/14/21   C/w prednisone 20 daily    cont on bactrim empirically, TIW   cont asa, vit c,    cont albuterol inhaler   pulm f/u  procalcitonin, D-dimer, crp, ldh, ferritin, lactate noted ,    cont tylenol prn,   cont robitussin prn   pt off precedex    pt on methadone   pain mgmt eval noted   cont phenylephrine drip for hypotension  clonidine held 2nd to low bp  s/p intubation 3/29/21   s/p tracheostomy 4/21/21  O2 sat 100% (97% - 100%) mv 40%  O2 via mech vent and taper fio2 as tolerated   vent mgmt as per icu  xray 3/19/21 with pneumomediastinum  rept cxr with New trace right apical pneumothorax. New mild left apical pneumothorax. Grossly stable small pneumomediastinum.  Soft tissue emphysema at the neck bases bilaterally. Grossly stable bilateral pulmonary infiltrates noted.   cxr 2/24 with No evidence of pneumothorax can be appreciated on the available image. This may be related to patient positioning. Evidence of pneumomediastinum and subcutaneous emphysema in the lower neck is again   noted. There are patchy bibasilar infiltrates and elevated right hemidiaphragm noted.   cxr 3/29/21 with No significant change bilateral infiltrates. There is a small simple left apical pneumothorax. No significant pleural effusion. Bilateral subcutaneous emphysema similar to prior.   XRs on 7AM and 5PM with no obvious increase of ptx  cxr 4/4/21 with Improving bilateral airspace disease noted    cxr 4/8/21 with Small left pneumothorax noted.  thoracic surg f/u   s/p L chest tube replacement 4/18/21 for recurrence of left pneumothorax   cxr 4/20/21 with Unchanged advanced infiltrates and catheter left chest tube noted   CXR 4/11/21 with : No interval change compared to one day prior. No pneumothorax noted.   unable to flush or aspirate tube fully, noted debris in tubing which was milked out.   Once material removed from tubing able to aspirated and flush fully. Also changed dressing on pigtail which appears to be in good position.   Monitor O2 status   s/p tracheostomy 4/21/21   cxr 4/21/21 with No evidence of active chest disease. Tracheostomy tube in place otherwise no significant change noted   cxr 4/25/21 with A 40% LEFT pneumothorax. LEFT multi-sidehole pigtail catheter overlies LEFT lower hemithorax.. Bilateral multifocal and diffuse ill-defined airspace opacities..  Follow-up AP portable chest radiograph 4/25/2021 AT 8:58 AM: Residual LEFT 30% upper zone pneumothorax. Otherwise no interval change.noted    cxr 4/30/21 Tracheostomy tube again noted. Left chest tube noted with small left apical pneumothorax unchanged. Bilateral airspace opacities overall worsening. Heart size cannot be accurately assessed in this projection noted.   cxr 5/3/21 with Large left pneumothorax noted above.   cxr 5 4/21 with No significant interval change noted above.   ct chest with Extensive chronic appearing consolidative opacities throughout the lungs, most prominent in the left lower lobe and lingula, with bronchiectasis, scarring, and volume loss. Additional scattered peripheral coarse opacities.   Mild left pneumothorax. Left lateral pleural space pigtail catheter, not in continuity with the pneumothorax. Mild bilateral hydronephrosis, similar to appearance on CT of the abdomen and pelvis on April 27. noted above   s/p 2nd chest tube 5/5/21  cxr 5/6/21 with Tracheostomy and catheter left chest tubes remain. , There are significant diffuse advanced infiltrates again noted. No pneumothorax. Above findings are similar to study earlier in the day. Present film shows a left jugular line inserted   with tip entering the superior vena cava noted   cxr 5/11/21 with Heart magnified by technique. Tracheostomy, left jugular line, and 2 catheter left chest tubes remain. Quite advanced infiltration in the lungs particularly in the left lower lobe again noted.  There is a persistent rather small left apical pneumothorax. Chest is similar to May 10 noted    cxt 5/14/21 with Perihilar diffuse airspace disease and LEFT lower lobe retrocardiac airspace consolidation. LEFT chest tube overlies upper zone. Small LEFT apical less than 5% pneumothorax noted   cxr 5/17/21 with Similar infiltrates. Small left pneumothorax similar to the prior study noted.   cxr 5/19/21 with Persistent mild to moderate left pneumothorax despite chest tube noted   cxr 5/20 with Improved left pneumothorax. Significant infiltration particularly in the left lower lobe again noted   cxr 5/21/21 with No appreciable pneumothorax at this time. Persistent advanced infiltrates noted    cxr 5/22/21 with  Left chest pigtail. Status post tracheostomy. Heart size unremarkable. No pneumothorax. Multiple patchy opacities throughout the left lung. Status post gastrostomy. No dilated loops of bowel noted above.   cxr 5/23/21 with Tracheostomy tube and within the left pigtail catheter are unchanged. Stable hazy opacities, left greater than the right. No pneumothorax noted above.  s/p surgical placement of gastrostomy tube 4/23/2021.   abd xray 5/10/21 with ileus noted   dressing changed daily for seroma   abd xray 5/21/21 with Decreased bowel ileus compared to prior 5/20/2021 exam noted above.   abd xray 5/22/21 with No dilated loops of bowel noted above.  cont tube feeding   CT scan of the chest 5/13/21 demonstrates diffuse bilateral infiltrates with a region of consolidation at the left lung base. Small left pneumothorax. 2 left chest tubes noted in place noted above.  CT scan of the abdomen and pelvis 5/13/21 demonstrates diffuse dilatation of small and large bowel loops most consistent with ileus noted   xray abd with  5/14/21 with Ingested contrast within nonobstructed large bowel to the level of rectum. No acute radiographic intra-abdominal findings noted above.  id f/u   patient completed course of Meropenem  leukocytosis resolved  sputum cx with Enterobacter aerogenes (Carbapenem Resistant) noted above   tmx 99.1    andrea changed  Completed  meropenem, s/p 1 dose of Vancomycin   completed zosyn  lispro ss   prognosis poor   s/p 4 units prbc for anemia  f/u h/h    transfuse prbc as needed  heme onc f/u  retic is elevated.    Haptoglobin normal  ? daily phlebotomy  no hemolysis, Fe/B12/folate adequate  hemolysis is unlikely even his baseline haptoglobin may be very elevated due to COVID  direct pamella neg noted    psych f/u  C/w Klonopin to 1 mg q8h standing (no PRNs), with plan to further taper as tolerated  Reduce Seroquel to 50 mg BID standing starting tonight  Continue delirium precautions: Frequent reorientation, familiar pictures and objects at bedside, natural light in daytime,   consistent sleep/wake schedule, adequate hydration and nutrition, sensory aids (hearing aids, glasses) present if needed, minimizing noise and overstimulation,   clustering care to minimize overnight interruptions, judicious use of deliriogenic medications (anticholinergics, benzodiazepines and opioid analgesics), minimize use of restraints  cont current meds   mgmt as per icu     Patient is a 55y old  Male who presents with a chief complaint of SOB (25 May 2021 10:40)    pt seen in icu [ x ], reg med floor [   ], bed [ x ], chair at bedside [   ], awake and responsive [ x ], mildly sedated, [  ],    nad [x  ]        Allergies    No Known Allergies        Vitals    T(F): 98.8 (05-26-21 @ 00:00), Max: 99.1 (05-25-21 @ 19:00)  HR: 76 (05-26-21 @ 06:00) (68 - 130)  BP: 128/75 (05-26-21 @ 06:00) (116/66 - 175/94)  RR: 22 (05-26-21 @ 06:00) (20 - 40)  SpO2: 100% (05-26-21 @ 06:00) (83% - 100%)  Wt(kg): --  CAPILLARY BLOOD GLUCOSE      POCT Blood Glucose.: 84 mg/dL (26 May 2021 05:54)      Labs                          8.8    7.26  )-----------( 251      ( 26 May 2021 04:23 )             27.9       05-26    142  |  104  |  13  ----------------------------<  76  3.3<L>   |  33<H>  |  0.92    Ca    9.2      26 May 2021 04:23  Phos  2.7     05-26  Mg     2.1     05-26    TPro  6.4  /  Alb  3.0<L>  /  TBili  1.0  /  DBili  x   /  AST  29  /  ALT  40  /  AlkPhos  75  05-26            .Sputum Sputum  04-16 @ 04:42   Moderate Enterobacter aerogenes (Carbapenem Resistant)  Normal Respiratory Monserrat present  --  Enterobacter aerogenes (Carbapenem Resistant)      .Urine Clean Catch (Midstream)  03-15 @ 00:52   No growth  --  --          Radiology Results      Meds    MEDICATIONS  (STANDING):  ascorbic acid 1000 milliGRAM(s) Oral daily  BACItracin   Ointment 1 Application(s) Topical two times a day  bisacodyl Suppository 10 milliGRAM(s) Rectal daily  chlorhexidine 2% Cloths 1 Application(s) Topical <User Schedule>  clonazePAM  Tablet 1 milliGRAM(s) Oral every 8 hours  enoxaparin Injectable 40 milliGRAM(s) SubCutaneous every 24 hours  gabapentin 100 milliGRAM(s) Oral every 8 hours  insulin lispro (ADMELOG) corrective regimen sliding scale   SubCutaneous every 6 hours  lactulose Syrup 10 Gram(s) Oral daily  methadone    Tablet 5 milliGRAM(s) Oral every 12 hours  naloxegol 25 milliGRAM(s) Oral daily  pantoprazole   Suspension 40 milliGRAM(s) Oral daily  potassium chloride  10 mEq/100 mL IVPB 10 milliEquivalent(s) IV Intermittent every 1 hour  predniSONE   Tablet 20 milliGRAM(s) Oral daily  QUEtiapine 50 milliGRAM(s) Oral two times a day  trimethoprim  40 mG/sulfamethoxazole 200 mG Suspension 160 milliGRAM(s) Oral <User Schedule>      MEDICATIONS  (PRN):  acetaminophen    Suspension .. 650 milliGRAM(s) Oral every 12 hours PRN Temp greater or equal to 38C (100.4F)  ALBUTerol    90 MICROgram(s) HFA Inhaler 2 Puff(s) Inhalation every 6 hours PRN Shortness of Breath and/or Wheezing  artificial  tears Solution 1 Drop(s) Both EYES every 4 hours PRN Dry Eyes  sodium chloride 0.9% lock flush 10 milliLiter(s) IV Push every 1 hour PRN Pre/post blood products, medications, blood draw, and to maintain line patency      Physical Exam    Neuro :  no focal deficits  Respiratory: CTA B/L  CV: RRR, S1S2, no murmurs,   Abdominal: Soft, NT, ND +BS, gastrostomy tube inplace  Extremities: edema of extrem, + peripheral pulses      ASSESSMENT      Hypoxemia 2nd to covid pna   transaminitis  prediabetes  h/o appendectomy  cholecystectomy        PLAN    cont cont precautions  covid 5/14/21 neg noted above  d/c remdesevir given covid ab positive noted   completed dexamethasone   started pulse steroids for 3 days - 250mg solumedrol bid now tapered off 4/14/21   C/w prednisone 20 daily    cont on bactrim empirically, TIW   cont asa, vit c,    cont albuterol inhaler   pulm f/u  procalcitonin, D-dimer, crp, ldh, ferritin, lactate noted ,    cont tylenol prn,   cont robitussin prn   pt off precedex    pt on methadone   pain mgmt eval noted   off phenylephrine drip for hypotension  clonidine held 2nd to low bp  s/p intubation 3/29/21   s/p tracheostomy 4/21/21  O2 sat  100% (83% - 100%) mv 40%  O2 via mech vent and taper fio2 as tolerated   vent mgmt as per icu  xray 3/19/21 with pneumomediastinum  rept cxr with New trace right apical pneumothorax. New mild left apical pneumothorax. Grossly stable small pneumomediastinum.  Soft tissue emphysema at the neck bases bilaterally. Grossly stable bilateral pulmonary infiltrates noted.   cxr 2/24 with No evidence of pneumothorax can be appreciated on the available image. This may be related to patient positioning. Evidence of pneumomediastinum and subcutaneous emphysema in the lower neck is again   noted. There are patchy bibasilar infiltrates and elevated right hemidiaphragm noted.   cxr 3/29/21 with No significant change bilateral infiltrates. There is a small simple left apical pneumothorax. No significant pleural effusion. Bilateral subcutaneous emphysema similar to prior.   XRs on 7AM and 5PM with no obvious increase of ptx  cxr 4/4/21 with Improving bilateral airspace disease noted    cxr 4/8/21 with Small left pneumothorax noted.  thoracic surg f/u   s/p L chest tube replacement 4/18/21 for recurrence of left pneumothorax   cxr 4/20/21 with Unchanged advanced infiltrates and catheter left chest tube noted   CXR 4/11/21 with : No interval change compared to one day prior. No pneumothorax noted.   unable to flush or aspirate tube fully, noted debris in tubing which was milked out.   Once material removed from tubing able to aspirated and flush fully. Also changed dressing on pigtail which appears to be in good position.   Monitor O2 status   s/p tracheostomy 4/21/21   cxr 4/21/21 with No evidence of active chest disease. Tracheostomy tube in place otherwise no significant change noted   cxr 4/25/21 with A 40% LEFT pneumothorax. LEFT multi-sidehole pigtail catheter overlies LEFT lower hemithorax.. Bilateral multifocal and diffuse ill-defined airspace opacities..  Follow-up AP portable chest radiograph 4/25/2021 AT 8:58 AM: Residual LEFT 30% upper zone pneumothorax. Otherwise no interval change.noted    cxr 4/30/21 Tracheostomy tube again noted. Left chest tube noted with small left apical pneumothorax unchanged. Bilateral airspace opacities overall worsening. Heart size cannot be accurately assessed in this projection noted.   cxr 5/3/21 with Large left pneumothorax noted above.   cxr 5 4/21 with No significant interval change noted above.   ct chest with Extensive chronic appearing consolidative opacities throughout the lungs, most prominent in the left lower lobe and lingula, with bronchiectasis, scarring, and volume loss. Additional scattered peripheral coarse opacities.   Mild left pneumothorax. Left lateral pleural space pigtail catheter, not in continuity with the pneumothorax. Mild bilateral hydronephrosis, similar to appearance on CT of the abdomen and pelvis on April 27. noted above   s/p 2nd chest tube 5/5/21  cxr 5/6/21 with Tracheostomy and catheter left chest tubes remain. , There are significant diffuse advanced infiltrates again noted. No pneumothorax. Above findings are similar to study earlier in the day. Present film shows a left jugular line inserted   with tip entering the superior vena cava noted   cxr 5/11/21 with Heart magnified by technique. Tracheostomy, left jugular line, and 2 catheter left chest tubes remain. Quite advanced infiltration in the lungs particularly in the left lower lobe again noted.  There is a persistent rather small left apical pneumothorax. Chest is similar to May 10 noted    cxt 5/14/21 with Perihilar diffuse airspace disease and LEFT lower lobe retrocardiac airspace consolidation. LEFT chest tube overlies upper zone. Small LEFT apical less than 5% pneumothorax noted   cxr 5/17/21 with Similar infiltrates. Small left pneumothorax similar to the prior study noted.   cxr 5/19/21 with Persistent mild to moderate left pneumothorax despite chest tube noted   cxr 5/20 with Improved left pneumothorax. Significant infiltration particularly in the left lower lobe again noted   cxr 5/21/21 with No appreciable pneumothorax at this time. Persistent advanced infiltrates noted    cxr 5/22/21 with  Left chest pigtail. Status post tracheostomy. Heart size unremarkable. No pneumothorax. Multiple patchy opacities throughout the left lung. Status post gastrostomy. No dilated loops of bowel noted above.   cxr 5/23/21 with Tracheostomy tube and within the left pigtail catheter are unchanged. Stable hazy opacities, left greater than the right. No pneumothorax noted above.  s/p surgical placement of gastrostomy tube 4/23/2021.   abd xray 5/10/21 with ileus noted   dressing changed daily for seroma   abd xray 5/21/21 with Decreased bowel ileus compared to prior 5/20/2021 exam noted above.   abd xray 5/22/21 with No dilated loops of bowel noted above.  cont tube feeding   CT scan of the chest 5/13/21 demonstrates diffuse bilateral infiltrates with a region of consolidation at the left lung base. Small left pneumothorax. 2 left chest tubes noted in place noted above.  CT scan of the abdomen and pelvis 5/13/21 demonstrates diffuse dilatation of small and large bowel loops most consistent with ileus noted   xray abd with  5/14/21 with Ingested contrast within nonobstructed large bowel to the level of rectum. No acute radiographic intra-abdominal findings noted above.  id f/u   patient completed course of Meropenem  leukocytosis resolved  sputum cx with Enterobacter aerogenes (Carbapenem Resistant) noted above   tmx 99.1    andrea changed  Completed  meropenem, s/p 1 dose of Vancomycin   completed zosyn  lispro ss   prognosis poor   s/p 4 units prbc for anemia  f/u h/h    transfuse prbc as needed  heme onc f/u  retic is elevated.    Haptoglobin normal  ? daily phlebotomy  no hemolysis, Fe/B12/folate adequate  hemolysis is unlikely even his baseline haptoglobin may be very elevated due to COVID  direct pamella neg noted    psych f/u  C/w Klonopin to 1 mg q8h standing (no PRNs), with plan to further taper as tolerated  Reduce Seroquel to 50 mg BID standing starting tonight  Continue delirium precautions: Frequent reorientation, familiar pictures and objects at bedside, natural light in daytime,   consistent sleep/wake schedule, adequate hydration and nutrition, sensory aids (hearing aids, glasses) present if needed, minimizing noise and overstimulation,   clustering care to minimize overnight interruptions, judicious use of deliriogenic medications (anticholinergics, benzodiazepines and opioid analgesics), minimize use of restraints  cont current meds   mgmt as per icu

## 2021-05-26 NOTE — PHYSICAL THERAPY INITIAL EVALUATION ADULT - FUNCTIONAL LIMITATIONS, PT EVAL
self-care/home management/community/leisure
self-care/home management/community/leisure
home management/community/leisure
self-care/home management/community/leisure
self-care/home management/work/community/leisure

## 2021-05-26 NOTE — PHYSICAL THERAPY INITIAL EVALUATION ADULT - STANDING BALANCE: STATIC
Good +
TBA as arousal attention and ability to follow commands improve
TBA as arousal attention and ability to follow commands improve
TBA as gross strength and ability to follow commands improve.
TBA as increased levels of activity tolerated; would be mechanical lift

## 2021-05-27 LAB
ALBUMIN SERPL ELPH-MCNC: 3.2 G/DL — LOW (ref 3.5–5)
ALP SERPL-CCNC: 82 U/L — SIGNIFICANT CHANGE UP (ref 40–120)
ALT FLD-CCNC: 46 U/L DA — SIGNIFICANT CHANGE UP (ref 10–60)
ANION GAP SERPL CALC-SCNC: 8 MMOL/L — SIGNIFICANT CHANGE UP (ref 5–17)
AST SERPL-CCNC: 31 U/L — SIGNIFICANT CHANGE UP (ref 10–40)
BILIRUB SERPL-MCNC: 1 MG/DL — SIGNIFICANT CHANGE UP (ref 0.2–1.2)
BUN SERPL-MCNC: 14 MG/DL — SIGNIFICANT CHANGE UP (ref 7–18)
CALCIUM SERPL-MCNC: 9.4 MG/DL — SIGNIFICANT CHANGE UP (ref 8.4–10.5)
CHLORIDE SERPL-SCNC: 100 MMOL/L — SIGNIFICANT CHANGE UP (ref 96–108)
CO2 SERPL-SCNC: 32 MMOL/L — HIGH (ref 22–31)
CREAT SERPL-MCNC: 1 MG/DL — SIGNIFICANT CHANGE UP (ref 0.5–1.3)
GLUCOSE BLDC GLUCOMTR-MCNC: 89 MG/DL — SIGNIFICANT CHANGE UP (ref 70–99)
GLUCOSE BLDC GLUCOMTR-MCNC: 99 MG/DL — SIGNIFICANT CHANGE UP (ref 70–99)
GLUCOSE SERPL-MCNC: 81 MG/DL — SIGNIFICANT CHANGE UP (ref 70–99)
HCT VFR BLD CALC: 29 % — LOW (ref 39–50)
HGB BLD-MCNC: 9.5 G/DL — LOW (ref 13–17)
MAGNESIUM SERPL-MCNC: 2 MG/DL — SIGNIFICANT CHANGE UP (ref 1.6–2.6)
MCHC RBC-ENTMCNC: 32.8 GM/DL — SIGNIFICANT CHANGE UP (ref 32–36)
MCHC RBC-ENTMCNC: 33.5 PG — SIGNIFICANT CHANGE UP (ref 27–34)
MCV RBC AUTO: 102.1 FL — HIGH (ref 80–100)
NRBC # BLD: 0 /100 WBCS — SIGNIFICANT CHANGE UP (ref 0–0)
PHOSPHATE SERPL-MCNC: 2.7 MG/DL — SIGNIFICANT CHANGE UP (ref 2.5–4.5)
PLATELET # BLD AUTO: 299 K/UL — SIGNIFICANT CHANGE UP (ref 150–400)
POTASSIUM SERPL-MCNC: 3.5 MMOL/L — SIGNIFICANT CHANGE UP (ref 3.5–5.3)
POTASSIUM SERPL-SCNC: 3.5 MMOL/L — SIGNIFICANT CHANGE UP (ref 3.5–5.3)
PROT SERPL-MCNC: 7 G/DL — SIGNIFICANT CHANGE UP (ref 6–8.3)
RBC # BLD: 2.84 M/UL — LOW (ref 4.2–5.8)
RBC # FLD: 16.3 % — HIGH (ref 10.3–14.5)
SODIUM SERPL-SCNC: 140 MMOL/L — SIGNIFICANT CHANGE UP (ref 135–145)
WBC # BLD: 9.1 K/UL — SIGNIFICANT CHANGE UP (ref 3.8–10.5)
WBC # FLD AUTO: 9.1 K/UL — SIGNIFICANT CHANGE UP (ref 3.8–10.5)

## 2021-05-27 PROCEDURE — 71045 X-RAY EXAM CHEST 1 VIEW: CPT | Mod: 26

## 2021-05-27 PROCEDURE — 74177 CT ABD & PELVIS W/CONTRAST: CPT | Mod: 26

## 2021-05-27 PROCEDURE — 99232 SBSQ HOSP IP/OBS MODERATE 35: CPT

## 2021-05-27 RX ORDER — IOHEXOL 300 MG/ML
30 INJECTION, SOLUTION INTRAVENOUS ONCE
Refills: 0 | Status: DISCONTINUED | OUTPATIENT
Start: 2021-05-27 | End: 2021-05-29

## 2021-05-27 RX ORDER — PANTOPRAZOLE SODIUM 20 MG/1
40 TABLET, DELAYED RELEASE ORAL DAILY
Refills: 0 | Status: DISCONTINUED | OUTPATIENT
Start: 2021-05-27 | End: 2021-06-11

## 2021-05-27 RX ORDER — ONDANSETRON 8 MG/1
4 TABLET, FILM COATED ORAL ONCE
Refills: 0 | Status: COMPLETED | OUTPATIENT
Start: 2021-05-27 | End: 2021-05-27

## 2021-05-27 RX ORDER — NALOXEGOL OXALATE 12.5 MG/1
12.5 TABLET, FILM COATED ORAL DAILY
Refills: 0 | Status: DISCONTINUED | OUTPATIENT
Start: 2021-05-27 | End: 2021-06-08

## 2021-05-27 RX ORDER — SODIUM CHLORIDE 9 MG/ML
1000 INJECTION, SOLUTION INTRAVENOUS
Refills: 0 | Status: DISCONTINUED | OUTPATIENT
Start: 2021-05-27 | End: 2021-05-31

## 2021-05-27 RX ORDER — POTASSIUM CHLORIDE 20 MEQ
10 PACKET (EA) ORAL
Refills: 0 | Status: COMPLETED | OUTPATIENT
Start: 2021-05-27 | End: 2021-05-27

## 2021-05-27 RX ORDER — ONDANSETRON 8 MG/1
4 TABLET, FILM COATED ORAL EVERY 6 HOURS
Refills: 0 | Status: DISCONTINUED | OUTPATIENT
Start: 2021-05-27 | End: 2021-06-11

## 2021-05-27 RX ADMIN — SODIUM CHLORIDE 75 MILLILITER(S): 9 INJECTION, SOLUTION INTRAVENOUS at 19:34

## 2021-05-27 RX ADMIN — PANTOPRAZOLE SODIUM 40 MILLIGRAM(S): 20 TABLET, DELAYED RELEASE ORAL at 13:15

## 2021-05-27 RX ADMIN — Medication 100 MILLIEQUIVALENT(S): at 08:39

## 2021-05-27 RX ADMIN — CHLORHEXIDINE GLUCONATE 1 APPLICATION(S): 213 SOLUTION TOPICAL at 05:02

## 2021-05-27 RX ADMIN — Medication 1 APPLICATION(S): at 18:51

## 2021-05-27 RX ADMIN — Medication 100 MILLIEQUIVALENT(S): at 05:24

## 2021-05-27 RX ADMIN — ONDANSETRON 4 MILLIGRAM(S): 8 TABLET, FILM COATED ORAL at 05:51

## 2021-05-27 RX ADMIN — Medication 100 MILLIEQUIVALENT(S): at 07:16

## 2021-05-27 RX ADMIN — Medication 1 APPLICATION(S): at 05:00

## 2021-05-27 RX ADMIN — ENOXAPARIN SODIUM 40 MILLIGRAM(S): 100 INJECTION SUBCUTANEOUS at 12:02

## 2021-05-27 NOTE — PROGRESS NOTE ADULT - ATTENDING COMMENTS
55 yr old  man , non smoker with  moody 1990s presented 3/14 with x9 days worsening cough, subjective fevers, and SOB, with x2-3 days dysuria and central, non-radiating, constant CP. Admitted to medicine unit  for acute hypoxic respiratory failure secondary to pna from covid-19 infection .     Assessment:  1. Acute hypoxic respiratory failure  2. Covid-19 infection   3. Transaminitis  4. Prediabetes  5. Bilateral pneumothorax  6. Septic shock - resolved  7. Anemia  8. bowel obstruction/ileus    Plan   - Multiple episodes of vomiting this morning   - Hold all PEG feeds and medications for now  - Obtain CT scan of the abdomen/pelvis  -Chest tube to water seal this morning  -Small PTX noted at left apex  -S/p tracheostomy placement 4/21   -S/p G-tube 4/23  -Cont. mechanical ventilation   -trach collar during day time, full vent support overnight  -PT evaluation, upright positioning as tolerated  -Tapering dose of steroids as ordered, bactrim for PCP prophylaxis   -C-diff PCR negative  -Cont. movantik,  -dvt/gi prophy  -hemodynamic monitoring   -Prognosis is guarded.

## 2021-05-27 NOTE — PHARMACOTHERAPY INTERVENTION NOTE - PROVIDER CONTACTED
June, No Name Given
Monae Zapien
Thao Gage
Yolanda Cosby
June, No Name Given
Monae Zapien
Monae Zapien
June, No Name Given
Shikha Gustafson

## 2021-05-27 NOTE — PROGRESS NOTE ADULT - ASSESSMENT
Assessment and plan: 56 y/o M with no PMH is admitted to ICU for AHRF 2/2 covid19 pna     1. Acute hypoxic respiratory failure  2. ARDS 2/2 Covid pneumonia  3. Pneumothorax  4. Prediabetes  5. Abnormal TSH     Neuro  -Alert and oriented x 3 at baseline   -Off all drips, and calm  -C/w Klonopin to 1mg q8h  -C/w Seroquel 75 mg BID   -Decreased methadone to 10/5/10  - d/c Ativan PRN, fentanyl PRN  -CT head unremarkable   -Low concern for malignant hypothermia, NMS, or serotonin syndrome given waxing/waning and lack of inciting medications    Cardiovascular  # Shock   now off pressors   Clonidine was d/c    Hypertension:  BP was found to be consistently elevated  Was started on labetalol 100mg bid     Pulm  #Acute hypoxic respiratory failure: secondary to Covid19 pneumonia  #ARDS  -Was on HFNC then got intubated 3/29  -Trach 4/22 continues on Vent   - Remdesivir was discontinued due to positive antibodies   - Finished Dexamethasone   - Covid19 PCR negative now, off isolation   - Daily SBT  Tolerated well today, will put on trach collar  Full vent support overnight    # Bacterial Pneumonia  - Stable infiltrate on CXR ,likely developed Bacterial pneumonia   - Completed ABx Zosyn, meropenem, s/p 1 dose of Vancomycin  - MRSA negative from 3/26, 5/18  - Sputum Cx growing few gram negative rods, CRE - Contact isolation  - Taper prednisone to 30 daily (5/11) for possible organizing pneumonia   - C/w bactrim empirically, TIW for PCP PPX  - Secretions from the trach, needs frequent suctions   - Pulm. Dr. Ryan  - ID Dr Anand   - Was found to have fevers 5/17  Was started on vanc and zosyn  MRSA is negative, will d/c vanc  zosyn now dc     #Pneumothorax and pneumomediastinum:   -Thoracic surgery following  -s/p Chest tube placed 4/8 pigtail to wall suction and d/c on 4/15  -On 4/18 chest tube replaced for recurrence of PTX- c/w chest tube to suction     -anterior chest tube placed 5/6 for worsening pneumothorax with resolution of PTX  5/10 CXR with recurrence of apical pneumo - tube adjusted with improvement however still persisting but stable  -Repeat Chest CT shows persistent PTX  -Water seal inferior CT -PTX was increased, readjusted tube as it was kinked.  - Lateral Ct was removed, repeat CXr showed developing of PTX in left lower lobe, resolving slowly  -Surgery was informed, following the case closely  - 5/23:  place CT to waterseal  5/24 reformation of PTX  5/24 CT to suction, CXR improvement  5/26 CT placed on water seal, will repeat CXR in PM      ID  Likely bacterial pneumonia   -leukocytosis improved 9k  -Febrile 100.4 5/18 - last episode  Was started on vanc and zosyn  MRSA is negative, will d/c vanc  dc zosyn 5/22 s/p 5 days of zosyn    Nephro  -Pitting edema to both arms, ecchymosis on right AC, blisters on left arm around brachial area    -Was retaining urine, andrea was placed 5/5  -DC doxazosin for borderline BPs  potassium and phosphorus replaced this morning; monitor electrolytes     JARRED:  Patient has elevated creatinine compared to baseline, creatinine clearance is trending up.  Will start on mild IV hydration  ua shows proteinuria, triple phosphate crystals, large blood >50 rbc  FENA 6.6%, post obstructive  Monitor BMP    GI  Transaminitis:   likely secondary to Covid19, Resolved   hepatitis panel -ve  -continue to monitor LFT    Ileus  Abd xray with ileus, CT showing the same 5/15  Restart trickle feeds  G Tube off suction now  Rectal tube in place  C/w Miralax BID standing and reglan   G tube 4/23, - dressing changed daily for seroma  Patient was found to have ileus again on 5/20  Was started on lactulose suppository  slowly start on tube feeds  Cdiff negative  Start movantik    Vomiting  Patient had multiple episodes of vomiting for past 24 htrs  Was given zofran  Tube feeds were held  Xray abdomen showed distension, likely worsening of ileus      Heme  Elevated d-dimer: likely secondary to Covid19   -D-dimer 423 on admission  -Last D-dimer 1434  - No active bleeding, restarted lovenox daily    Anemia  S/p 4 PRBC total  - Now Hg stable 7-9  CTH and CT abdomen w/o contrast no evidence of bleed    No active signs of bleeding (No hematemesis, melena or hematuria)  Hemolysis w/u- negative  Iron studies show ACD  Dr Andrade on board   F/u CBC daily     Endocrine  Prediabetes:  -A1c 5.8  -BS controlled  -continue HSS    Abnormal TSH:  -TSH level noted 0.26,   -Repeat TSH 0.37 and Free T4 1.82    Skin/Catheters  No rashes. Peripheral IV lines.   Both arms with edema, right AC with ecchymosis  doppler of LUE was negative    Prophylaxis   On Lovenox for DVT   Protonix for GI proph    GOC  FULL CODE Assessment and plan: 54 y/o M with no PMH is admitted to ICU for AHRF 2/2 covid19 pna     1. Acute hypoxic respiratory failure  2. ARDS 2/2 Covid pneumonia  3. Pneumothorax  4. Prediabetes  5. Abnormal TSH     Neuro  -Alert and oriented x 3 at baseline   -Off all drips, and calm  -C/w Klonopin to 1mg q8h  -C/w Seroquel 75 mg BID   -Decreased methadone to 10/5/10  - d/c Ativan PRN, fentanyl PRN  -CT head unremarkable   -Low concern for malignant hypothermia, NMS, or serotonin syndrome given waxing/waning and lack of inciting medications    Cardiovascular  # Shock   now off pressors   Clonidine was d/c    Hypertension:  BP was found to be consistently elevated  Was started on labetalol 100mg bid     Pulm  #Acute hypoxic respiratory failure: secondary to Covid19 pneumonia  #ARDS  -Was on HFNC then got intubated 3/29  -Trach 4/22 continues on Vent   - Remdesivir was discontinued due to positive antibodies   - Finished Dexamethasone   - Covid19 PCR negative now, off isolation   - Daily SBT  Tolerated well today, will put on trach collar  Full vent support overnight    # Bacterial Pneumonia  - Stable infiltrate on CXR ,likely developed Bacterial pneumonia   - Completed ABx Zosyn, meropenem, s/p 1 dose of Vancomycin  - MRSA negative from 3/26, 5/18  - Sputum Cx growing few gram negative rods, CRE - Contact isolation  - Taper prednisone to 30 daily (5/11) for possible organizing pneumonia   - C/w bactrim empirically, TIW for PCP PPX  - Secretions from the trach, needs frequent suctions   - Pulm. Dr. Ryan  - ID Dr Anand   - Was found to have fevers 5/17  Was started on vanc and zosyn  MRSA is negative, will d/c vanc  zosyn now dc     #Pneumothorax and pneumomediastinum:   -Thoracic surgery following  -s/p Chest tube placed 4/8 pigtail to wall suction and d/c on 4/15  -On 4/18 chest tube replaced for recurrence of PTX- c/w chest tube to suction     -anterior chest tube placed 5/6 for worsening pneumothorax with resolution of PTX  5/10 CXR with recurrence of apical pneumo - tube adjusted with improvement however still persisting but stable  -Repeat Chest CT shows persistent PTX  -Water seal inferior CT -PTX was increased, readjusted tube as it was kinked.  - Lateral Ct was removed, repeat CXr showed developing of PTX in left lower lobe, resolving slowly  -Surgery was informed, following the case closely  - 5/23:  place CT to waterseal  5/24 reformation of PTX  5/24 CT to suction, CXR improvement  5/26 CT placed on water seal, will repeat CXR in PM      ID  Likely bacterial pneumonia   -leukocytosis improved 9k  -Febrile 100.4 5/18 - last episode  Was started on vanc and zosyn  MRSA is negative, will d/c vanc  dc zosyn 5/22 s/p 5 days of zosyn    Nephro  -Pitting edema to both arms, ecchymosis on right AC, blisters on left arm around brachial area    -Was retaining urine, andrea was placed 5/5  -DC doxazosin for borderline BPs  potassium and phosphorus replaced this morning; monitor electrolytes     JARRED:  Patient has elevated creatinine compared to baseline, creatinine clearance is trending up.  Will start on mild IV hydration  ua shows proteinuria, triple phosphate crystals, large blood >50 rbc  FENA 6.6%, post obstructive  Monitor BMP    GI  Transaminitis:   likely secondary to Covid19, Resolved   hepatitis panel -ve  -continue to monitor LFT    Ileus  Abd xray with ileus, CT showing the same 5/15  Restart trickle feeds  G Tube off suction now  Rectal tube in place  C/w Miralax BID standing and reglan   G tube 4/23, - dressing changed daily for seroma  Patient was found to have ileus again on 5/20  Was started on lactulose suppository  slowly start on tube feeds  Cdiff negative  Start movantik    Vomiting  Patient had multiple episodes of vomiting for past 24 htrs  Was given zofran  Tube feeds were held  Xray abdomen showed distension, likely worsening of ileus  Will do CT abdomen with IV contrast.      Heme  Elevated d-dimer: likely secondary to Covid19   -D-dimer 423 on admission  -Last D-dimer 1434  - No active bleeding, restarted lovenox daily    Anemia  S/p 4 PRBC total  - Now Hg stable 7-9  CTH and CT abdomen w/o contrast no evidence of bleed    No active signs of bleeding (No hematemesis, melena or hematuria)  Hemolysis w/u- negative  Iron studies show ACD  Dr Andrade on board   F/u CBC daily     Endocrine  Prediabetes:  -A1c 5.8  -BS controlled  -continue HSS    Abnormal TSH:  -TSH level noted 0.26,   -Repeat TSH 0.37 and Free T4 1.82    Skin/Catheters  No rashes. Peripheral IV lines.   Both arms with edema, right AC with ecchymosis  doppler of LUE was negative    Prophylaxis   On Lovenox for DVT   Protonix for GI proph    GOC  FULL CODE

## 2021-05-27 NOTE — BH CONSULTATION LIAISON PROGRESS NOTE - NSBHCONSULTRECOMMENDOTHER_PSY_A_CORE FT
1. To prevent BZD withdrawal while NPO, recommend Ativan 0.5 mg q4h PRN agitation  2. When pt resumes taking PO, would reinstate Klonopin at reduced dose of 0.5 mg q8h standing (no PRNs), Seroquel at same dose (50 mg BID)  3. Continue delirium precautions: Frequent reorientation, familiar pictures and objects at bedside, natural light in daytime, consistent sleep/wake schedule, adequate hydration and nutrition, sensory aids (hearing aids, glasses) present if needed, minimizing noise and overstimulation, clustering care to minimize overnight interruptions, judicious use of deliriogenic medications (anticholinergics, benzodiazepines and opioid analgesics), minimize use of restraints  4. Medical management as directed by primary team  5. Psychiatry will continue to follow  6. Case d/w ICU team    Paloma Cali MD  Director, Consultation-Liaison Psychiatry Service  [id number]

## 2021-05-27 NOTE — PROGRESS NOTE ADULT - SUBJECTIVE AND OBJECTIVE BOX
Patient is a 55y old  Male who presents with a chief complaint of SOB (26 May 2021 11:03)    pt seen in icu [ x ], reg med floor [   ], bed [ x ], chair at bedside [   ], awake and responsive [ x ], mildly sedated, [  ],    nad [x  ]        Allergies    No Known Allergies        Vitals    T(F): 99.1 (05-27-21 @ 02:22), Max: 100.1 (05-26-21 @ 23:00)  HR: 95 (05-27-21 @ 05:00) (81 - 126)  BP: 155/83 (05-27-21 @ 05:00) (108/82 - 173/95)  RR: 22 (05-27-21 @ 05:00) (16 - 42)  SpO2: 100% (05-27-21 @ 05:00) (95% - 100%)  Wt(kg): --  CAPILLARY BLOOD GLUCOSE      POCT Blood Glucose.: 89 mg/dL (27 May 2021 03:37)      Labs                          9.5    9.10  )-----------( 299      ( 27 May 2021 04:22 )             29.0       05-27    140  |  100  |  14  ----------------------------<  81  3.5   |  32<H>  |  1.00    Ca    9.4      27 May 2021 04:22  Phos  2.7     05-27  Mg     2.0     05-27    TPro  7.0  /  Alb  3.2<L>  /  TBili  1.0  /  DBili  x   /  AST  31  /  ALT  46  /  AlkPhos  82  05-27            .Sputum Sputum  04-16 @ 04:42   Moderate Enterobacter aerogenes (Carbapenem Resistant)  Normal Respiratory Monserrat present  --  Enterobacter aerogenes (Carbapenem Resistant)      .Urine Clean Catch (Midstream)  03-15 @ 00:52   No growth  --  --          Radiology Results      Meds    MEDICATIONS  (STANDING):  ascorbic acid 1000 milliGRAM(s) Oral daily  BACItracin   Ointment 1 Application(s) Topical two times a day  bisacodyl Suppository 10 milliGRAM(s) Rectal daily  calcium carbonate 1250 mG  + Vitamin D (OsCal 500 + D) 1 Tablet(s) Oral daily  chlorhexidine 2% Cloths 1 Application(s) Topical <User Schedule>  cholecalciferol 1000 Unit(s) Oral daily  clonazePAM  Tablet 1 milliGRAM(s) Oral every 8 hours  enoxaparin Injectable 40 milliGRAM(s) SubCutaneous every 24 hours  gabapentin 100 milliGRAM(s) Oral every 8 hours  labetalol 100 milliGRAM(s) Enteral Tube two times a day  methadone    Tablet 5 milliGRAM(s) Oral every 12 hours  naloxegol 25 milliGRAM(s) Oral daily  pantoprazole   Suspension 40 milliGRAM(s) Oral daily  potassium chloride  10 mEq/100 mL IVPB 10 milliEquivalent(s) IV Intermittent every 1 hour  predniSONE   Tablet 20 milliGRAM(s) Oral daily  QUEtiapine 50 milliGRAM(s) Oral two times a day  sodium chloride 0.9%. 1000 milliLiter(s) (75 mL/Hr) IV Continuous <Continuous>  trimethoprim  40 mG/sulfamethoxazole 200 mG Suspension 160 milliGRAM(s) Oral <User Schedule>      MEDICATIONS  (PRN):  acetaminophen    Suspension .. 650 milliGRAM(s) Oral every 12 hours PRN Temp greater or equal to 38C (100.4F)  ALBUTerol    90 MICROgram(s) HFA Inhaler 2 Puff(s) Inhalation every 6 hours PRN Shortness of Breath and/or Wheezing  artificial  tears Solution 1 Drop(s) Both EYES every 4 hours PRN Dry Eyes  sodium chloride 0.9% lock flush 10 milliLiter(s) IV Push every 1 hour PRN Pre/post blood products, medications, blood draw, and to maintain line patency      Physical Exam    Neuro :  no focal deficits  Respiratory: CTA B/L  CV: RRR, S1S2, no murmurs,   Abdominal: Soft, NT, ND +BS, gastrostomy tube inplace  Extremities: edema of extrem, + peripheral pulses      ASSESSMENT      Hypoxemia 2nd to covid pna   transaminitis  prediabetes  h/o appendectomy  cholecystectomy        PLAN    cont cont precautions  covid 5/14/21 neg noted above  d/c remdesevir given covid ab positive noted   completed dexamethasone   started pulse steroids for 3 days - 250mg solumedrol bid now tapered off 4/14/21   C/w prednisone 20 daily    cont on bactrim empirically, TIW   cont asa, vit c,    cont albuterol inhaler   pulm f/u  procalcitonin, D-dimer, crp, ldh, ferritin, lactate noted ,    cont tylenol prn,   cont robitussin prn   pt off precedex    pt on methadone   pain mgmt eval noted   off phenylephrine drip for hypotension  clonidine held 2nd to low bp  s/p intubation 3/29/21   s/p tracheostomy 4/21/21  O2 sat  100% (83% - 100%) mv 40%  O2 via mech vent and taper fio2 as tolerated   vent mgmt as per icu  xray 3/19/21 with pneumomediastinum  rept cxr with New trace right apical pneumothorax. New mild left apical pneumothorax. Grossly stable small pneumomediastinum.  Soft tissue emphysema at the neck bases bilaterally. Grossly stable bilateral pulmonary infiltrates noted.   cxr 2/24 with No evidence of pneumothorax can be appreciated on the available image. This may be related to patient positioning. Evidence of pneumomediastinum and subcutaneous emphysema in the lower neck is again   noted. There are patchy bibasilar infiltrates and elevated right hemidiaphragm noted.   cxr 3/29/21 with No significant change bilateral infiltrates. There is a small simple left apical pneumothorax. No significant pleural effusion. Bilateral subcutaneous emphysema similar to prior.   XRs on 7AM and 5PM with no obvious increase of ptx  cxr 4/4/21 with Improving bilateral airspace disease noted    cxr 4/8/21 with Small left pneumothorax noted.  thoracic surg f/u   s/p L chest tube replacement 4/18/21 for recurrence of left pneumothorax   cxr 4/20/21 with Unchanged advanced infiltrates and catheter left chest tube noted   CXR 4/11/21 with : No interval change compared to one day prior. No pneumothorax noted.   unable to flush or aspirate tube fully, noted debris in tubing which was milked out.   Once material removed from tubing able to aspirated and flush fully. Also changed dressing on pigtail which appears to be in good position.   Monitor O2 status   s/p tracheostomy 4/21/21   cxr 4/21/21 with No evidence of active chest disease. Tracheostomy tube in place otherwise no significant change noted   cxr 4/25/21 with A 40% LEFT pneumothorax. LEFT multi-sidehole pigtail catheter overlies LEFT lower hemithorax.. Bilateral multifocal and diffuse ill-defined airspace opacities..  Follow-up AP portable chest radiograph 4/25/2021 AT 8:58 AM: Residual LEFT 30% upper zone pneumothorax. Otherwise no interval change.noted    cxr 4/30/21 Tracheostomy tube again noted. Left chest tube noted with small left apical pneumothorax unchanged. Bilateral airspace opacities overall worsening. Heart size cannot be accurately assessed in this projection noted.   cxr 5/3/21 with Large left pneumothorax noted above.   cxr 5 4/21 with No significant interval change noted above.   ct chest with Extensive chronic appearing consolidative opacities throughout the lungs, most prominent in the left lower lobe and lingula, with bronchiectasis, scarring, and volume loss. Additional scattered peripheral coarse opacities.   Mild left pneumothorax. Left lateral pleural space pigtail catheter, not in continuity with the pneumothorax. Mild bilateral hydronephrosis, similar to appearance on CT of the abdomen and pelvis on April 27. noted above   s/p 2nd chest tube 5/5/21  cxr 5/6/21 with Tracheostomy and catheter left chest tubes remain. , There are significant diffuse advanced infiltrates again noted. No pneumothorax. Above findings are similar to study earlier in the day. Present film shows a left jugular line inserted   with tip entering the superior vena cava noted   cxr 5/11/21 with Heart magnified by technique. Tracheostomy, left jugular line, and 2 catheter left chest tubes remain. Quite advanced infiltration in the lungs particularly in the left lower lobe again noted.  There is a persistent rather small left apical pneumothorax. Chest is similar to May 10 noted    cxt 5/14/21 with Perihilar diffuse airspace disease and LEFT lower lobe retrocardiac airspace consolidation. LEFT chest tube overlies upper zone. Small LEFT apical less than 5% pneumothorax noted   cxr 5/17/21 with Similar infiltrates. Small left pneumothorax similar to the prior study noted.   cxr 5/19/21 with Persistent mild to moderate left pneumothorax despite chest tube noted   cxr 5/20 with Improved left pneumothorax. Significant infiltration particularly in the left lower lobe again noted   cxr 5/21/21 with No appreciable pneumothorax at this time. Persistent advanced infiltrates noted    cxr 5/22/21 with  Left chest pigtail. Status post tracheostomy. Heart size unremarkable. No pneumothorax. Multiple patchy opacities throughout the left lung. Status post gastrostomy. No dilated loops of bowel noted above.   cxr 5/23/21 with Tracheostomy tube and within the left pigtail catheter are unchanged. Stable hazy opacities, left greater than the right. No pneumothorax noted above.  s/p surgical placement of gastrostomy tube 4/23/2021.   abd xray 5/10/21 with ileus noted   dressing changed daily for seroma   abd xray 5/21/21 with Decreased bowel ileus compared to prior 5/20/2021 exam noted above.   abd xray 5/22/21 with No dilated loops of bowel noted above.  cont tube feeding   CT scan of the chest 5/13/21 demonstrates diffuse bilateral infiltrates with a region of consolidation at the left lung base. Small left pneumothorax. 2 left chest tubes noted in place noted above.  CT scan of the abdomen and pelvis 5/13/21 demonstrates diffuse dilatation of small and large bowel loops most consistent with ileus noted   xray abd with  5/14/21 with Ingested contrast within nonobstructed large bowel to the level of rectum. No acute radiographic intra-abdominal findings noted above.  id f/u   patient completed course of Meropenem  leukocytosis resolved  sputum cx with Enterobacter aerogenes (Carbapenem Resistant) noted above   tmx 99.1    andrea changed  Completed  meropenem, s/p 1 dose of Vancomycin   completed zosyn  lispro ss   prognosis poor   s/p 4 units prbc for anemia  f/u h/h    transfuse prbc as needed  heme onc f/u  retic is elevated.    Haptoglobin normal  ? daily phlebotomy  no hemolysis, Fe/B12/folate adequate  hemolysis is unlikely even his baseline haptoglobin may be very elevated due to COVID  direct pamella neg noted    psych f/u  C/w Klonopin to 1 mg q8h standing (no PRNs), with plan to further taper as tolerated  Reduce Seroquel to 50 mg BID standing starting tonight  Continue delirium precautions: Frequent reorientation, familiar pictures and objects at bedside, natural light in daytime,   consistent sleep/wake schedule, adequate hydration and nutrition, sensory aids (hearing aids, glasses) present if needed, minimizing noise and overstimulation,   clustering care to minimize overnight interruptions, judicious use of deliriogenic medications (anticholinergics, benzodiazepines and opioid analgesics), minimize use of restraints  cont current meds   mgmt as per icu     Patient is a 55y old  Male who presents with a chief complaint of SOB (26 May 2021 11:03)    pt seen in icu [ x ], reg med floor [   ], bed [ x ], chair at bedside [   ], awake and responsive [ x ], mildly sedated, [  ],    nad [x  ]        Allergies    No Known Allergies        Vitals    T(F): 99.1 (05-27-21 @ 02:22), Max: 100.1 (05-26-21 @ 23:00)  HR: 95 (05-27-21 @ 05:00) (81 - 126)  BP: 155/83 (05-27-21 @ 05:00) (108/82 - 173/95)  RR: 22 (05-27-21 @ 05:00) (16 - 42)  SpO2: 100% (05-27-21 @ 05:00) (95% - 100%)  Wt(kg): --  CAPILLARY BLOOD GLUCOSE      POCT Blood Glucose.: 89 mg/dL (27 May 2021 03:37)      Labs                          9.5    9.10  )-----------( 299      ( 27 May 2021 04:22 )             29.0       05-27    140  |  100  |  14  ----------------------------<  81  3.5   |  32<H>  |  1.00    Ca    9.4      27 May 2021 04:22  Phos  2.7     05-27  Mg     2.0     05-27    TPro  7.0  /  Alb  3.2<L>  /  TBili  1.0  /  DBili  x   /  AST  31  /  ALT  46  /  AlkPhos  82  05-27            .Sputum Sputum  04-16 @ 04:42   Moderate Enterobacter aerogenes (Carbapenem Resistant)  Normal Respiratory Monserrat present  --  Enterobacter aerogenes (Carbapenem Resistant)      .Urine Clean Catch (Midstream)  03-15 @ 00:52   No growth  --  --          Radiology Results    < from: Xray Chest 1 View-PORTABLE IMMEDIATE (Xray Chest 1 View-PORTABLE IMMEDIATE .) (05.26.21 @ 15:07) >  Heart magnified by technique. Tracheostomy and catheter left chest tube remain.    Left lower lobe infiltrate lateral left lung infiltrate again seen.    Rather small left apical pneumothorax again noted and small right upper lobe infiltrate again seen.    Chest is similar to earlier inthe day.    IMPRESSION: Unchanged study as above.    < end of copied text >        Meds    MEDICATIONS  (STANDING):  ascorbic acid 1000 milliGRAM(s) Oral daily  BACItracin   Ointment 1 Application(s) Topical two times a day  bisacodyl Suppository 10 milliGRAM(s) Rectal daily  calcium carbonate 1250 mG  + Vitamin D (OsCal 500 + D) 1 Tablet(s) Oral daily  chlorhexidine 2% Cloths 1 Application(s) Topical <User Schedule>  cholecalciferol 1000 Unit(s) Oral daily  clonazePAM  Tablet 1 milliGRAM(s) Oral every 8 hours  enoxaparin Injectable 40 milliGRAM(s) SubCutaneous every 24 hours  gabapentin 100 milliGRAM(s) Oral every 8 hours  labetalol 100 milliGRAM(s) Enteral Tube two times a day  methadone    Tablet 5 milliGRAM(s) Oral every 12 hours  naloxegol 25 milliGRAM(s) Oral daily  pantoprazole   Suspension 40 milliGRAM(s) Oral daily  potassium chloride  10 mEq/100 mL IVPB 10 milliEquivalent(s) IV Intermittent every 1 hour  predniSONE   Tablet 20 milliGRAM(s) Oral daily  QUEtiapine 50 milliGRAM(s) Oral two times a day  sodium chloride 0.9%. 1000 milliLiter(s) (75 mL/Hr) IV Continuous <Continuous>  trimethoprim  40 mG/sulfamethoxazole 200 mG Suspension 160 milliGRAM(s) Oral <User Schedule>      MEDICATIONS  (PRN):  acetaminophen    Suspension .. 650 milliGRAM(s) Oral every 12 hours PRN Temp greater or equal to 38C (100.4F)  ALBUTerol    90 MICROgram(s) HFA Inhaler 2 Puff(s) Inhalation every 6 hours PRN Shortness of Breath and/or Wheezing  artificial  tears Solution 1 Drop(s) Both EYES every 4 hours PRN Dry Eyes  sodium chloride 0.9% lock flush 10 milliLiter(s) IV Push every 1 hour PRN Pre/post blood products, medications, blood draw, and to maintain line patency      Physical Exam    Neuro :  no focal deficits  Respiratory: CTA B/L  CV: RRR, S1S2, no murmurs,   Abdominal: Soft, NT, ND +BS, gastrostomy tube inplace  Extremities: edema of extrem, + peripheral pulses      ASSESSMENT      Hypoxemia 2nd to covid pna   transaminitis  prediabetes  h/o appendectomy  cholecystectomy        PLAN    cont cont precautions  covid 5/14/21 neg noted above  d/c remdesevir given covid ab positive noted   completed dexamethasone   started pulse steroids for 3 days - 250mg solumedrol bid now tapered off 4/14/21   C/w prednisone 20 daily    cont on bactrim empirically, TIW   cont asa, vit c,    cont albuterol inhaler   pulm f/u  procalcitonin, D-dimer, crp, ldh, ferritin, lactate noted ,    cont tylenol prn,   cont robitussin prn   pt off precedex    pt on methadone   pain mgmt eval noted   off phenylephrine drip for hypotension  clonidine held 2nd to low bp  s/p intubation 3/29/21   s/p tracheostomy 4/21/21  O2 sat  100% (95% - 100%) mv 40%  O2 via mech vent and taper fio2 as tolerated   vent mgmt as per icu  xray 3/19/21 with pneumomediastinum  rept cxr with New trace right apical pneumothorax. New mild left apical pneumothorax. Grossly stable small pneumomediastinum.  Soft tissue emphysema at the neck bases bilaterally. Grossly stable bilateral pulmonary infiltrates noted.   cxr 2/24 with No evidence of pneumothorax can be appreciated on the available image. This may be related to patient positioning. Evidence of pneumomediastinum and subcutaneous emphysema in the lower neck is again   noted. There are patchy bibasilar infiltrates and elevated right hemidiaphragm noted.   cxr 3/29/21 with No significant change bilateral infiltrates. There is a small simple left apical pneumothorax. No significant pleural effusion. Bilateral subcutaneous emphysema similar to prior.   XRs on 7AM and 5PM with no obvious increase of ptx  cxr 4/4/21 with Improving bilateral airspace disease noted    cxr 4/8/21 with Small left pneumothorax noted.  thoracic surg f/u   s/p L chest tube replacement 4/18/21 for recurrence of left pneumothorax   cxr 4/20/21 with Unchanged advanced infiltrates and catheter left chest tube noted   CXR 4/11/21 with : No interval change compared to one day prior. No pneumothorax noted.   unable to flush or aspirate tube fully, noted debris in tubing which was milked out.   Once material removed from tubing able to aspirated and flush fully. Also changed dressing on pigtail which appears to be in good position.   Monitor O2 status   s/p tracheostomy 4/21/21   cxr 4/21/21 with No evidence of active chest disease. Tracheostomy tube in place otherwise no significant change noted   cxr 4/25/21 with A 40% LEFT pneumothorax. LEFT multi-sidehole pigtail catheter overlies LEFT lower hemithorax.. Bilateral multifocal and diffuse ill-defined airspace opacities..  Follow-up AP portable chest radiograph 4/25/2021 AT 8:58 AM: Residual LEFT 30% upper zone pneumothorax. Otherwise no interval change.noted    cxr 4/30/21 Tracheostomy tube again noted. Left chest tube noted with small left apical pneumothorax unchanged. Bilateral airspace opacities overall worsening. Heart size cannot be accurately assessed in this projection noted.   cxr 5/3/21 with Large left pneumothorax noted above.   cxr 5 4/21 with No significant interval change noted above.   ct chest with Extensive chronic appearing consolidative opacities throughout the lungs, most prominent in the left lower lobe and lingula, with bronchiectasis, scarring, and volume loss. Additional scattered peripheral coarse opacities.   Mild left pneumothorax. Left lateral pleural space pigtail catheter, not in continuity with the pneumothorax. Mild bilateral hydronephrosis, similar to appearance on CT of the abdomen and pelvis on April 27. noted above   s/p 2nd chest tube 5/5/21  cxr 5/6/21 with Tracheostomy and catheter left chest tubes remain. , There are significant diffuse advanced infiltrates again noted. No pneumothorax. Above findings are similar to study earlier in the day. Present film shows a left jugular line inserted   with tip entering the superior vena cava noted   cxr 5/11/21 with Heart magnified by technique. Tracheostomy, left jugular line, and 2 catheter left chest tubes remain. Quite advanced infiltration in the lungs particularly in the left lower lobe again noted.  There is a persistent rather small left apical pneumothorax. Chest is similar to May 10 noted    cxt 5/14/21 with Perihilar diffuse airspace disease and LEFT lower lobe retrocardiac airspace consolidation. LEFT chest tube overlies upper zone. Small LEFT apical less than 5% pneumothorax noted   cxr 5/17/21 with Similar infiltrates. Small left pneumothorax similar to the prior study noted.   cxr 5/19/21 with Persistent mild to moderate left pneumothorax despite chest tube noted   cxr 5/20 with Improved left pneumothorax. Significant infiltration particularly in the left lower lobe again noted   cxr 5/21/21 with No appreciable pneumothorax at this time. Persistent advanced infiltrates noted    cxr 5/22/21 with  Left chest pigtail. Status post tracheostomy. Heart size unremarkable. No pneumothorax. Multiple patchy opacities throughout the left lung. Status post gastrostomy. No dilated loops of bowel noted above.   cxr 5/23/21 with Tracheostomy tube and within the left pigtail catheter are unchanged. Stable hazy opacities, left greater than the right. No pneumothorax noted above.   cxr 5/26/21 with Left lower lobe infiltrate lateral left lung infiltrate again seen. Rather small left apical pneumothorax again noted and small right upper lobe infiltrate again seen. Chest is similar to earlier in the day.noted above.  s/p surgical placement of gastrostomy tube 4/23/2021.   abd xray 5/10/21 with ileus noted   dressing changed daily for seroma   abd xray 5/21/21 with Decreased bowel ileus compared to prior 5/20/2021 exam noted above.   abd xray 5/22/21 with No dilated loops of bowel noted above.   cont tube feeding   CT scan of the chest 5/13/21 demonstrates diffuse bilateral infiltrates with a region of consolidation at the left lung base. Small left pneumothorax. 2 left chest tubes noted in place noted above.  CT scan of the abdomen and pelvis 5/13/21 demonstrates diffuse dilatation of small and large bowel loops most consistent with ileus noted   xray abd with  5/14/21 with Ingested contrast within nonobstructed large bowel to the level of rectum. No acute radiographic intra-abdominal findings noted above.  id f/u   patient completed course of Meropenem  leukocytosis resolved  sputum cx with Enterobacter aerogenes (Carbapenem Resistant) noted above   tmx 100.1     andrea changed  Completed  meropenem, s/p 1 dose of Vancomycin   completed zosyn  lispro ss   prognosis poor   s/p 4 units prbc for anemia  f/u h/h    transfuse prbc as needed  heme onc f/u  retic is elevated.    Haptoglobin normal  ? daily phlebotomy  no hemolysis, Fe/B12/folate adequate  hemolysis is unlikely even his baseline haptoglobin may be very elevated due to COVID  direct pamella neg noted    psych f/u  C/w Klonopin to 1 mg q8h standing (no PRNs), with plan to further taper as tolerated  cont Seroquel to 50 mg BID standing starting tonight  Continue delirium precautions: Frequent reorientation, familiar pictures and objects at bedside, natural light in daytime,   consistent sleep/wake schedule, adequate hydration and nutrition, sensory aids (hearing aids, glasses) present if needed, minimizing noise and overstimulation,   clustering care to minimize overnight interruptions, judicious use of deliriogenic medications (anticholinergics, benzodiazepines and opioid analgesics), minimize use of restraints  cont current meds   mgmt as per icu     CIRILO Ziegler NOTE: Spoke to Bruno (Poison ) on telephone from Poison Control; recommended lithium level labs and IVF, monitor for signs of AMS. Pt on cardiac monitor. CIRILO Ziegler NOTE: Spoke to rBuno (Poison ) on telephone from Poison Control; lithium level is 0.4, recommended a repeat lithium level in 4 hours, c/w IVF. CIRILO Ziegler NOTE: Contacted  for eval, pending evaluation by . Pt signed out to me by Dr. Hicks pending psych admission.  Pt to be transferred for psychiatric admission. Pt signed out to Dr. Allred pending psychiatric placement.

## 2021-05-27 NOTE — PROGRESS NOTE ADULT - SUBJECTIVE AND OBJECTIVE BOX
Patient is a 55y old  Male who presents with a chief complaint of SOB (27 May 2021 07:52)  Awake, alert, comfortable in bed in NAD. Still on vent via trach. Less tachypneic. Responsive to verbal stimuli    INTERVAL HPI/OVERNIGHT EVENTS:      VITAL SIGNS:  T(F): 99 (05-27-21 @ 08:00)  HR: 125 (05-27-21 @ 09:00)  BP: 180/89 (05-27-21 @ 09:00)  RR: 37 (05-27-21 @ 09:00)  SpO2: 99% (05-27-21 @ 09:00)  Wt(kg): --  I&O's Detail    26 May 2021 07:01  -  27 May 2021 07:00  --------------------------------------------------------  IN:    Enteral Tube Flush: 200 mL    IV PiggyBack: 300 mL    IV PiggyBack: 100 mL    Jevity 1.5: 140 mL    sodium chloride 0.9%: 300 mL  Total IN: 1040 mL    OUT:    Chest Tube (mL): 0 mL    Emesis (mL): 700 mL    Indwelling Catheter - Urethral (mL): 150 mL    Voided (mL): 600 mL  Total OUT: 1450 mL    Total NET: -410 mL        Mode: standby        REVIEW OF SYSTEMS:    CONSTITUTIONAL:  No fevers, chills, sweats    HEENT:  Eyes:  No diplopia or blurred vision. ENT:  No earache, sore throat or runny nose.    CARDIOVASCULAR:  No pressure, squeezing, tightness, or heaviness about the chest; no palpitations.    RESPIRATORY:  Per HPI    GASTROINTESTINAL:  No abdominal pain, nausea, vomiting or diarrhea.    GENITOURINARY:  No dysuria, frequency or urgency.    NEUROLOGIC:  No paresthesias, fasciculations, seizures or weakness.    PSYCHIATRIC:  No disorder of thought or mood.      PHYSICAL EXAM:    Constitutional: Well developed and nourished  Eyes:Perrla  ENMT: normal  Neck:supple  Respiratory: good air entry  Cardiovascular: S1 S2 regular  Gastrointestinal: Soft, Non tender  Extremities: No edema  Vascular:normal  Neurological:Awake, alert,Ox3  Musculoskeletal:Normal      MEDICATIONS  (STANDING):  ascorbic acid 1000 milliGRAM(s) Oral daily  BACItracin   Ointment 1 Application(s) Topical two times a day  bisacodyl Suppository 10 milliGRAM(s) Rectal daily  calcium carbonate 1250 mG  + Vitamin D (OsCal 500 + D) 1 Tablet(s) Oral daily  chlorhexidine 2% Cloths 1 Application(s) Topical <User Schedule>  cholecalciferol 1000 Unit(s) Oral daily  clonazePAM  Tablet 1 milliGRAM(s) Oral every 8 hours  enoxaparin Injectable 40 milliGRAM(s) SubCutaneous every 24 hours  gabapentin 100 milliGRAM(s) Oral every 8 hours  labetalol 100 milliGRAM(s) Enteral Tube two times a day  methadone    Tablet 5 milliGRAM(s) Oral every 12 hours  naloxegol 25 milliGRAM(s) Oral daily  pantoprazole   Suspension 40 milliGRAM(s) Oral daily  predniSONE   Tablet 20 milliGRAM(s) Oral daily  QUEtiapine 50 milliGRAM(s) Oral two times a day  sodium chloride 0.9%. 1000 milliLiter(s) (75 mL/Hr) IV Continuous <Continuous>  trimethoprim  40 mG/sulfamethoxazole 200 mG Suspension 160 milliGRAM(s) Oral <User Schedule>    MEDICATIONS  (PRN):  acetaminophen    Suspension .. 650 milliGRAM(s) Oral every 12 hours PRN Temp greater or equal to 38C (100.4F)  ALBUTerol    90 MICROgram(s) HFA Inhaler 2 Puff(s) Inhalation every 6 hours PRN Shortness of Breath and/or Wheezing  artificial  tears Solution 1 Drop(s) Both EYES every 4 hours PRN Dry Eyes  sodium chloride 0.9% lock flush 10 milliLiter(s) IV Push every 1 hour PRN Pre/post blood products, medications, blood draw, and to maintain line patency      Allergies    No Known Allergies    Intolerances        LABS:                        9.5    9.10  )-----------( 299      ( 27 May 2021 04:22 )             29.0     05-27    140  |  100  |  14  ----------------------------<  81  3.5   |  32<H>  |  1.00    Ca    9.4      27 May 2021 04:22  Phos  2.7     05-27  Mg     2.0     05-27    TPro  7.0  /  Alb  3.2<L>  /  TBili  1.0  /  DBili  x   /  AST  31  /  ALT  46  /  AlkPhos  82  05-27        ABG - ( 26 May 2021 05:07 )  pH, Arterial: 7.47  pH, Blood: x     /  pCO2: 45    /  pO2: 104   / HCO3: 33    / Base Excess: 8.0   /  SaO2: 100                   CAPILLARY BLOOD GLUCOSE      POCT Blood Glucose.: 89 mg/dL (27 May 2021 03:37)  POCT Blood Glucose.: 79 mg/dL (26 May 2021 21:43)        RADIOLOGY & ADDITIONAL TESTS:    CXR:  < from: Xray Chest 1 View-PORTABLE IMMEDIATE (Xray Chest 1 View-PORTABLE IMMEDIATE .) (05.26.21 @ 15:07) >  IMPRESSION: Unchanged study as above.    < end of copied text >    Ct scan chest:    ekg;    echo:

## 2021-05-27 NOTE — PROCEDURE NOTE - NSPROCNAME_GEN_A_CORE
Central Line Insertion
Arterial Puncture/Cannulation
Central Line Insertion
Peripheral Line Insertion
Central Line Insertion
Chest Tube

## 2021-05-27 NOTE — PROCEDURE NOTE - NSINFORMCONSENT_GEN_A_CORE
[facility]  [address] [facility] [address]  [place], [address]  [phone] fax    Plan of Care/ Statement of Necessity for Physical Therapy Services    Patient name: Toña Sommers Start of Care: 2018   Referral source: Shwetha Elena MD : 1959    Medical Diagnosis: Presence of right artificial hip joint [Z96.641]   Onset Date:18    Treatment Diagnosis: s/p right THR   Prior Hospitalization: see medical history Provider#: [id number]   Medications: Verified on Patient summary List    Comorbidities: depression; right shoulder pathology   Prior Level of Function: required assistance from daughter with all ADLs; used [facility] for ambulation; lives with daughter     The Plan of Care and following information is based on the information from the initial evaluation. Assessment/ key information: 61y.o. year old female presents with CC of right hip pain and weakness s/p THR posterolateral approach. Deficits include: significant weakness in right hip and impaired gait and balance. Patient will benefit from physical therapy to address deficits, and ultimately to return patient to prior level of function. Evaluation Complexity History MEDIUM  Complexity : 1-2 comorbidities / personal factors will impact the outcome/ POC ; Examination HIGH Complexity : 4+ Standardized tests and measures addressing body structure, function, activity limitation and / or participation in recreation  ;Presentation MEDIUM Complexity : Evolving with changing characteristics  ; Clinical Decision Making MEDIUM Complexity : FOTO score of 26-74  Overall Complexity Rating: MEDIUM  Problem List: pain affecting function, decrease strength, impaired gait/ balance, decrease ADL/ functional abilitiies, decrease activity tolerance and decrease flexibility/ joint mobility   Treatment Plan may include any combination of the following: Therapeutic exercise, Neuromuscular re-education, Physical
This was an emergent procedure.
agent/modality, Gait/balance training, Manual therapy and Patient education  Patient / Family readiness to learn indicated by: asking questions, trying to perform skills and interest  Persons(s) to be included in education: patient (P)  Barriers to Learning/Limitations: None  Patient Goal (s): to walk right with no pain or swelling  Patient Self Reported Health Status: good  Rehabilitation Potential: good    Short Term Goals: To be accomplished in 2 weeks:   1. I and compliant with HEP for self management of symptoms. 2. Patient will ambulate with SPC >500' in clinic to increase ease and stability with household distances. Long Term Goals: To be accomplished in 6 weeks:   1. Improve FOTO to 61 to indicate improved function with daily activities. 2. Increase right hip flexor strength to 4/5 to enable patient to transfer in/out of bathtub (I). 3. Increase right hip ABD/EXT strength to 4/5 to enable patient to ambulate community distances with LRAD. Frequency / Duration: Patient to be seen 2 times per week for 6 weeks. Patient/ Caregiver education and instruction: Diagnosis, prognosis, exercises   [x]  Plan of care has been reviewed with SUSHIL Melendez, PT 8/21/2018 4:15 PM    ________________________________________________________________________    I certify that the above Therapy Services are being furnished while the patient is under my care. I agree with the treatment plan and certify that this therapy is necessary.     Physician's Signature:____________Date:_________TIME:________    ** Signature, Date and Time must be completed for valid certification **    Please sign and return to [facility] [address] [place] [address] & [facility]  [address] [place] [address], [address]  [phone] fax
This was an emergent procedure.
Benefits, risks, and possible complications of procedure explained to patient/caregiver who verbalized understanding and gave verbal consent.
This was an emergent procedure.
Benefits, risks, and possible complications of procedure explained to patient/caregiver who verbalized understanding and gave verbal consent.
Patient's brother winn/Benefits, risks, and possible complications of procedure explained to patient/caregiver who verbalized understanding and gave verbal consent.
This was an emergent procedure.
Benefits, risks, and possible complications of procedure explained to patient/caregiver who verbalized understanding and gave written consent.

## 2021-05-27 NOTE — PROCEDURE NOTE - NSPROCDETAILS_GEN_ALL_CORE
Seldinger technique
location identified, draped/prepped, sterile technique used/blood seen on insertion/dressing applied/flushes easily/secured in place
Seldinger technique/dressing applied/secured in place/percutaneous
guidewire recovered/lumen(s) aspirated and flushed/sterile dressing applied/sterile technique, catheter placed/ultrasound guidance with use of sterile gel and probe cove
guidewire recovered/lumen(s) aspirated and flushed/sterile dressing applied/sterile technique, catheter placed/ultrasound guidance with use of sterile gel and probe cove
Seldinger technique/dressing applied/secured in place/sterile dressing applied/percutaneous/thoracostomy tube placed percutaneously
guidewire recovered/lumen(s) aspirated and flushed/sterile dressing applied/ultrasound guidance with use of sterile gel and probe cove
guidewire recovered/lumen(s) aspirated and flushed/sterile dressing applied/ultrasound guidance with use of sterile gel and probe cove
guidewire recovered/lumen(s) aspirated and flushed/sterile technique, catheter placed/ultrasound guidance with use of sterile gel and probe cove

## 2021-05-27 NOTE — PROCEDURE NOTE - ADDITIONAL PROCEDURE DETAILS
L pigtail cath emergently placed for Large L PTX  family attempted to be contacted unsuccessfully  d/w Dr Arroyo  proceed with emergency CT placement
Right upper arm extended dwell catheter placed with ultrasound assistance.

## 2021-05-27 NOTE — BH CONSULTATION LIAISON PROGRESS NOTE - NSBHASSESSMENTFT_PSY_ALL_CORE
55M from [facility], single and living alone working in a restaurant, with no reported PHx or MHx, BIB self on 3/14 c/o fever, cough and SOB and found to have COVID-19 PNA, transferred to ICU on 4/9 for AHRF, later developed bacterial PNA now s/p trach and PEG. Psych consulted for med management due to difficulty weaning off sedation. On initial exam, pt is highly somnolent and unable to participate, but hx and current presentation appear highly c/w acute multifactorial delirium i/s/o AHRF, BZD withdrawal and opioid withdrawal. On f/u today, pt presents more awake, alert and interactive, but appears very uncomfortable, likely 2/2 recurrent ileus. For management of BZD dependence while NPO, we recommend Ativan IV q4h PRN, with plan to reinstate Klonopin and Seroquel PO as soon as pt is able to tolerate PO. Pt disposition will depend on clinical course, but there is currently no reason to believe that pt will require admission to IP psychiatry.

## 2021-05-27 NOTE — PROGRESS NOTE ADULT - SUBJECTIVE AND OBJECTIVE BOX
INTERVAL HPI/OVERNIGHT EVENTS: Patient had another 2 episodes of vomiting. No symptoms of abdominal pain. Xray abdomen showed progression of ileus, will follow up official report to r/o obstruction    PRESSORS: [ ] YES [ x] NO  WHICH:    ANTIBIOTICS:                      Antimicrobial:  trimethoprim  40 mG/sulfamethoxazole 200 mG Suspension 160 milliGRAM(s) Oral <User Schedule>    Cardiovascular:  labetalol 100 milliGRAM(s) Enteral Tube two times a day    Pulmonary:  ALBUTerol    90 MICROgram(s) HFA Inhaler 2 Puff(s) Inhalation every 6 hours PRN    Hematalogic:  enoxaparin Injectable 40 milliGRAM(s) SubCutaneous every 24 hours    Other:  acetaminophen    Suspension .. 650 milliGRAM(s) Oral every 12 hours PRN  artificial  tears Solution 1 Drop(s) Both EYES every 4 hours PRN  ascorbic acid 1000 milliGRAM(s) Oral daily  BACItracin   Ointment 1 Application(s) Topical two times a day  bisacodyl Suppository 10 milliGRAM(s) Rectal daily  calcium carbonate 1250 mG  + Vitamin D (OsCal 500 + D) 1 Tablet(s) Oral daily  chlorhexidine 2% Cloths 1 Application(s) Topical <User Schedule>  cholecalciferol 1000 Unit(s) Oral daily  clonazePAM  Tablet 1 milliGRAM(s) Oral every 8 hours  gabapentin 100 milliGRAM(s) Oral every 8 hours  methadone    Tablet 5 milliGRAM(s) Oral every 12 hours  naloxegol 25 milliGRAM(s) Oral daily  pantoprazole   Suspension 40 milliGRAM(s) Oral daily  potassium chloride  10 mEq/100 mL IVPB 10 milliEquivalent(s) IV Intermittent every 1 hour  predniSONE   Tablet 20 milliGRAM(s) Oral daily  QUEtiapine 50 milliGRAM(s) Oral two times a day  sodium chloride 0.9% lock flush 10 milliLiter(s) IV Push every 1 hour PRN  sodium chloride 0.9%. 1000 milliLiter(s) IV Continuous <Continuous>    acetaminophen    Suspension .. 650 milliGRAM(s) Oral every 12 hours PRN  ALBUTerol    90 MICROgram(s) HFA Inhaler 2 Puff(s) Inhalation every 6 hours PRN  artificial  tears Solution 1 Drop(s) Both EYES every 4 hours PRN  ascorbic acid 1000 milliGRAM(s) Oral daily  BACItracin   Ointment 1 Application(s) Topical two times a day  bisacodyl Suppository 10 milliGRAM(s) Rectal daily  calcium carbonate 1250 mG  + Vitamin D (OsCal 500 + D) 1 Tablet(s) Oral daily  chlorhexidine 2% Cloths 1 Application(s) Topical <User Schedule>  cholecalciferol 1000 Unit(s) Oral daily  clonazePAM  Tablet 1 milliGRAM(s) Oral every 8 hours  enoxaparin Injectable 40 milliGRAM(s) SubCutaneous every 24 hours  gabapentin 100 milliGRAM(s) Oral every 8 hours  labetalol 100 milliGRAM(s) Enteral Tube two times a day  methadone    Tablet 5 milliGRAM(s) Oral every 12 hours  naloxegol 25 milliGRAM(s) Oral daily  pantoprazole   Suspension 40 milliGRAM(s) Oral daily  potassium chloride  10 mEq/100 mL IVPB 10 milliEquivalent(s) IV Intermittent every 1 hour  predniSONE   Tablet 20 milliGRAM(s) Oral daily  QUEtiapine 50 milliGRAM(s) Oral two times a day  sodium chloride 0.9% lock flush 10 milliLiter(s) IV Push every 1 hour PRN  sodium chloride 0.9%. 1000 milliLiter(s) IV Continuous <Continuous>  trimethoprim  40 mG/sulfamethoxazole 200 mG Suspension 160 milliGRAM(s) Oral <User Schedule>    Drug Dosing Weight  Height (cm): 167.6 (23 Apr 2021 23:15)  Weight (kg): 83.9 (23 Apr 2021 23:15)  BMI (kg/m2): 29.9 (23 Apr 2021 23:15)  BSA (m2): 1.93 (23 Apr 2021 23:15)    CENTRAL LINE: [ ] YES [ x] NO  LOCATION:   DATE INSERTED:  REMOVE: [ ] YES [ ] NO  EXPLAIN:    RIVERA: [ ] YES [x ] NO    DATE INSERTED:  REMOVE:  [ ] YES [ ] NO  EXPLAIN:    A-LINE:  [ ] YES [x ] NO  LOCATION:   DATE INSERTED:  REMOVE:  [ ] YES [ ] NO  EXPLAIN:    PMH -reviewed admission note, no change since admission    ICU Vital Signs Last 24 Hrs  T(C): 37.6 (27 May 2021 05:00), Max: 37.8 (26 May 2021 23:00)  T(F): 99.6 (27 May 2021 05:00), Max: 100.1 (26 May 2021 23:00)  HR: 112 (27 May 2021 07:00) (81 - 126)  BP: 168/97 (27 May 2021 07:00) (108/82 - 173/95)  BP(mean): 113 (27 May 2021 07:00) (83 - 115)  RR: 18 (27 May 2021 07:00) (16 - 42)  SpO2: 98% (27 May 2021 07:00) (95% - 100%)      ABG - ( 26 May 2021 05:07 )  pH, Arterial: 7.47  pH, Blood: x     /  pCO2: 45    /  pO2: 104   / HCO3: 33    / Base Excess: 8.0   /  SaO2: 100                   05-26 @ 07:01  -  05-27 @ 07:00  --------------------------------------------------------  IN: 1040 mL / OUT: 1450 mL / NET: -410 mL        Mode: AC/ CMV (Assist Control/ Continuous Mandatory Ventilation)  RR (machine): 22  TV (machine): 450  FiO2: 40  PEEP: 5  ITime: 0.7  MAP: 12  PIP: 37      PHYSICAL EXAM:  GENERAL: nad, on trach collar  HEAD:  Atraumatic, Normocephalic  EYES: EOMI, PERRLA, conjunctiva and sclera clear  ENMT: No tonsillar erythema, exudates, or enlargement; Moist mucous membranes, Good dentition, No lesions  NECK: Supple, normal appearance, No JVD; Normal thyroid; Trachea midline  NERVOUS SYSTEM:  Alert & Oriented, obeying commands  CHEST/LUNG: No chest deformity; Normal percussion bilaterally; No rales, rhonchi, wheezing   HEART: Regular rate and rhythm; No murmurs, rubs, or gallops  ABDOMEN: Soft, Nontender, Nondistended; Bowel sounds present  EXTREMITIES:  2+ Peripheral Pulses, No clubbing, cyanosis, or edema  LYMPH: No lymphadenopathy noted  SKIN: No rashes or lesions; Good capillary refill      LABS:  CBC Full  -  ( 27 May 2021 04:22 )  WBC Count : 9.10 K/uL  RBC Count : 2.84 M/uL  Hemoglobin : 9.5 g/dL  Hematocrit : 29.0 %  Platelet Count - Automated : 299 K/uL  Mean Cell Volume : 102.1 fl  Mean Cell Hemoglobin : 33.5 pg  Mean Cell Hemoglobin Concentration : 32.8 gm/dL  Auto Neutrophil # : x  Auto Lymphocyte # : x  Auto Monocyte # : x  Auto Eosinophil # : x  Auto Basophil # : x  Auto Neutrophil % : x  Auto Lymphocyte % : x  Auto Monocyte % : x  Auto Eosinophil % : x  Auto Basophil % : x    05-27    140  |  100  |  14  ----------------------------<  81  3.5   |  32<H>  |  1.00    Ca    9.4      27 May 2021 04:22  Phos  2.7     05-27  Mg     2.0     05-27    TPro  7.0  /  Alb  3.2<L>  /  TBili  1.0  /  DBili  x   /  AST  31  /  ALT  46  /  AlkPhos  82  05-27            RADIOLOGY & ADDITIONAL STUDIES REVIEWED:  ***    GOALS OF CARE DISCUSSION WITH PATIENT/FAMILY/PROXY:    CRITICAL CARE TIME SPENT: 35 minutes INTERVAL HPI/OVERNIGHT EVENTS: Patient had another 2 episodes of vomiting. No symptoms of abdominal pain. Xray abdomen showed progression of ileus, will follow up official report to r/o obstruction. Holding feeds. Hold all oral medications. Will get CT abdomen with IV contrast.    PRESSORS: [ ] YES [ x] NO  WHICH:    ANTIBIOTICS:                      Antimicrobial:  trimethoprim  40 mG/sulfamethoxazole 200 mG Suspension 160 milliGRAM(s) Oral <User Schedule>    Cardiovascular:  labetalol 100 milliGRAM(s) Enteral Tube two times a day    Pulmonary:  ALBUTerol    90 MICROgram(s) HFA Inhaler 2 Puff(s) Inhalation every 6 hours PRN    Hematalogic:  enoxaparin Injectable 40 milliGRAM(s) SubCutaneous every 24 hours    Other:  acetaminophen    Suspension .. 650 milliGRAM(s) Oral every 12 hours PRN  artificial  tears Solution 1 Drop(s) Both EYES every 4 hours PRN  ascorbic acid 1000 milliGRAM(s) Oral daily  BACItracin   Ointment 1 Application(s) Topical two times a day  bisacodyl Suppository 10 milliGRAM(s) Rectal daily  calcium carbonate 1250 mG  + Vitamin D (OsCal 500 + D) 1 Tablet(s) Oral daily  chlorhexidine 2% Cloths 1 Application(s) Topical <User Schedule>  cholecalciferol 1000 Unit(s) Oral daily  clonazePAM  Tablet 1 milliGRAM(s) Oral every 8 hours  gabapentin 100 milliGRAM(s) Oral every 8 hours  methadone    Tablet 5 milliGRAM(s) Oral every 12 hours  naloxegol 25 milliGRAM(s) Oral daily  pantoprazole   Suspension 40 milliGRAM(s) Oral daily  potassium chloride  10 mEq/100 mL IVPB 10 milliEquivalent(s) IV Intermittent every 1 hour  predniSONE   Tablet 20 milliGRAM(s) Oral daily  QUEtiapine 50 milliGRAM(s) Oral two times a day  sodium chloride 0.9% lock flush 10 milliLiter(s) IV Push every 1 hour PRN  sodium chloride 0.9%. 1000 milliLiter(s) IV Continuous <Continuous>    acetaminophen    Suspension .. 650 milliGRAM(s) Oral every 12 hours PRN  ALBUTerol    90 MICROgram(s) HFA Inhaler 2 Puff(s) Inhalation every 6 hours PRN  artificial  tears Solution 1 Drop(s) Both EYES every 4 hours PRN  ascorbic acid 1000 milliGRAM(s) Oral daily  BACItracin   Ointment 1 Application(s) Topical two times a day  bisacodyl Suppository 10 milliGRAM(s) Rectal daily  calcium carbonate 1250 mG  + Vitamin D (OsCal 500 + D) 1 Tablet(s) Oral daily  chlorhexidine 2% Cloths 1 Application(s) Topical <User Schedule>  cholecalciferol 1000 Unit(s) Oral daily  clonazePAM  Tablet 1 milliGRAM(s) Oral every 8 hours  enoxaparin Injectable 40 milliGRAM(s) SubCutaneous every 24 hours  gabapentin 100 milliGRAM(s) Oral every 8 hours  labetalol 100 milliGRAM(s) Enteral Tube two times a day  methadone    Tablet 5 milliGRAM(s) Oral every 12 hours  naloxegol 25 milliGRAM(s) Oral daily  pantoprazole   Suspension 40 milliGRAM(s) Oral daily  potassium chloride  10 mEq/100 mL IVPB 10 milliEquivalent(s) IV Intermittent every 1 hour  predniSONE   Tablet 20 milliGRAM(s) Oral daily  QUEtiapine 50 milliGRAM(s) Oral two times a day  sodium chloride 0.9% lock flush 10 milliLiter(s) IV Push every 1 hour PRN  sodium chloride 0.9%. 1000 milliLiter(s) IV Continuous <Continuous>  trimethoprim  40 mG/sulfamethoxazole 200 mG Suspension 160 milliGRAM(s) Oral <User Schedule>    Drug Dosing Weight  Height (cm): 167.6 (23 Apr 2021 23:15)  Weight (kg): 83.9 (23 Apr 2021 23:15)  BMI (kg/m2): 29.9 (23 Apr 2021 23:15)  BSA (m2): 1.93 (23 Apr 2021 23:15)    CENTRAL LINE: [ ] YES [ x] NO  LOCATION:   DATE INSERTED:  REMOVE: [ ] YES [ ] NO  EXPLAIN:    RIVERA: [ ] YES [x ] NO    DATE INSERTED:  REMOVE:  [ ] YES [ ] NO  EXPLAIN:    A-LINE:  [ ] YES [x ] NO  LOCATION:   DATE INSERTED:  REMOVE:  [ ] YES [ ] NO  EXPLAIN:    PMH -reviewed admission note, no change since admission    ICU Vital Signs Last 24 Hrs  T(C): 37.6 (27 May 2021 05:00), Max: 37.8 (26 May 2021 23:00)  T(F): 99.6 (27 May 2021 05:00), Max: 100.1 (26 May 2021 23:00)  HR: 112 (27 May 2021 07:00) (81 - 126)  BP: 168/97 (27 May 2021 07:00) (108/82 - 173/95)  BP(mean): 113 (27 May 2021 07:00) (83 - 115)  RR: 18 (27 May 2021 07:00) (16 - 42)  SpO2: 98% (27 May 2021 07:00) (95% - 100%)      ABG - ( 26 May 2021 05:07 )  pH, Arterial: 7.47  pH, Blood: x     /  pCO2: 45    /  pO2: 104   / HCO3: 33    / Base Excess: 8.0   /  SaO2: 100                   05-26 @ 07:01  -  05-27 @ 07:00  --------------------------------------------------------  IN: 1040 mL / OUT: 1450 mL / NET: -410 mL        Mode: AC/ CMV (Assist Control/ Continuous Mandatory Ventilation)  RR (machine): 22  TV (machine): 450  FiO2: 40  PEEP: 5  ITime: 0.7  MAP: 12  PIP: 37      PHYSICAL EXAM:  GENERAL: Nauseous  HEAD:  Atraumatic, Normocephalic  EYES: EOMI, PERRLA, conjunctiva and sclera clear  ENMT: No tonsillar erythema, exudates, or enlargement; Moist mucous membranes, Good dentition, No lesions  NECK: Supple, normal appearance, No JVD; Normal thyroid; Trachea midline  NERVOUS SYSTEM:  Alert & Oriented, obeying commands  CHEST/LUNG: No chest deformity; Normal percussion bilaterally; No rales, rhonchi, wheezing, CT to water seal  HEART: Regular rate and rhythm; No murmurs, rubs, or gallops  ABDOMEN: Soft, Nontender, Nondistended; Bowel sounds present  EXTREMITIES:  2+ Peripheral Pulses, No clubbing, cyanosis, or edema  LYMPH: No lymphadenopathy noted  SKIN: No rashes or lesions; Good capillary refill      LABS:  CBC Full  -  ( 27 May 2021 04:22 )  WBC Count : 9.10 K/uL  RBC Count : 2.84 M/uL  Hemoglobin : 9.5 g/dL  Hematocrit : 29.0 %  Platelet Count - Automated : 299 K/uL  Mean Cell Volume : 102.1 fl  Mean Cell Hemoglobin : 33.5 pg  Mean Cell Hemoglobin Concentration : 32.8 gm/dL  Auto Neutrophil # : x  Auto Lymphocyte # : x  Auto Monocyte # : x  Auto Eosinophil # : x  Auto Basophil # : x  Auto Neutrophil % : x  Auto Lymphocyte % : x  Auto Monocyte % : x  Auto Eosinophil % : x  Auto Basophil % : x    05-27    140  |  100  |  14  ----------------------------<  81  3.5   |  32<H>  |  1.00    Ca    9.4      27 May 2021 04:22  Phos  2.7     05-27  Mg     2.0     05-27    TPro  7.0  /  Alb  3.2<L>  /  TBili  1.0  /  DBili  x   /  AST  31  /  ALT  46  /  AlkPhos  82  05-27            RADIOLOGY & ADDITIONAL STUDIES REVIEWED:  ***    GOALS OF CARE DISCUSSION WITH PATIENT/FAMILY/PROXY:    CRITICAL CARE TIME SPENT: 35 minutes

## 2021-05-28 LAB
ALBUMIN SERPL ELPH-MCNC: 2.9 G/DL — LOW (ref 3.5–5)
ALP SERPL-CCNC: 82 U/L — SIGNIFICANT CHANGE UP (ref 40–120)
ALT FLD-CCNC: 47 U/L DA — SIGNIFICANT CHANGE UP (ref 10–60)
ANION GAP SERPL CALC-SCNC: 11 MMOL/L — SIGNIFICANT CHANGE UP (ref 5–17)
AST SERPL-CCNC: 28 U/L — SIGNIFICANT CHANGE UP (ref 10–40)
BILIRUB SERPL-MCNC: 0.9 MG/DL — SIGNIFICANT CHANGE UP (ref 0.2–1.2)
BUN SERPL-MCNC: 16 MG/DL — SIGNIFICANT CHANGE UP (ref 7–18)
CALCIUM SERPL-MCNC: 8.9 MG/DL — SIGNIFICANT CHANGE UP (ref 8.4–10.5)
CHLORIDE SERPL-SCNC: 101 MMOL/L — SIGNIFICANT CHANGE UP (ref 96–108)
CO2 SERPL-SCNC: 30 MMOL/L — SIGNIFICANT CHANGE UP (ref 22–31)
CREAT SERPL-MCNC: 0.89 MG/DL — SIGNIFICANT CHANGE UP (ref 0.5–1.3)
GLUCOSE SERPL-MCNC: 83 MG/DL — SIGNIFICANT CHANGE UP (ref 70–99)
HCT VFR BLD CALC: 27 % — LOW (ref 39–50)
HGB BLD-MCNC: 8.9 G/DL — LOW (ref 13–17)
MAGNESIUM SERPL-MCNC: 1.8 MG/DL — SIGNIFICANT CHANGE UP (ref 1.6–2.6)
MCHC RBC-ENTMCNC: 33 GM/DL — SIGNIFICANT CHANGE UP (ref 32–36)
MCHC RBC-ENTMCNC: 33.3 PG — SIGNIFICANT CHANGE UP (ref 27–34)
MCV RBC AUTO: 101.1 FL — HIGH (ref 80–100)
NRBC # BLD: 0 /100 WBCS — SIGNIFICANT CHANGE UP (ref 0–0)
PHOSPHATE SERPL-MCNC: 2.8 MG/DL — SIGNIFICANT CHANGE UP (ref 2.5–4.5)
PLATELET # BLD AUTO: 274 K/UL — SIGNIFICANT CHANGE UP (ref 150–400)
POTASSIUM SERPL-MCNC: 3.7 MMOL/L — SIGNIFICANT CHANGE UP (ref 3.5–5.3)
POTASSIUM SERPL-SCNC: 3.7 MMOL/L — SIGNIFICANT CHANGE UP (ref 3.5–5.3)
PROT SERPL-MCNC: 6.6 G/DL — SIGNIFICANT CHANGE UP (ref 6–8.3)
RBC # BLD: 2.67 M/UL — LOW (ref 4.2–5.8)
RBC # FLD: 16.3 % — HIGH (ref 10.3–14.5)
SODIUM SERPL-SCNC: 142 MMOL/L — SIGNIFICANT CHANGE UP (ref 135–145)
WBC # BLD: 8.41 K/UL — SIGNIFICANT CHANGE UP (ref 3.8–10.5)
WBC # FLD AUTO: 8.41 K/UL — SIGNIFICANT CHANGE UP (ref 3.8–10.5)

## 2021-05-28 PROCEDURE — 74018 RADEX ABDOMEN 1 VIEW: CPT | Mod: 26

## 2021-05-28 PROCEDURE — 71045 X-RAY EXAM CHEST 1 VIEW: CPT | Mod: 26

## 2021-05-28 RX ORDER — METHADONE HYDROCHLORIDE 40 MG/1
5 TABLET ORAL ONCE
Refills: 0 | Status: DISCONTINUED | OUTPATIENT
Start: 2021-05-28 | End: 2021-05-28

## 2021-05-28 RX ORDER — CLONAZEPAM 1 MG
1 TABLET ORAL EVERY 8 HOURS
Refills: 0 | Status: DISCONTINUED | OUTPATIENT
Start: 2021-05-28 | End: 2021-05-31

## 2021-05-28 RX ORDER — CLONAZEPAM 1 MG
1 TABLET ORAL EVERY 8 HOURS
Refills: 0 | Status: DISCONTINUED | OUTPATIENT
Start: 2021-05-28 | End: 2021-05-28

## 2021-05-28 RX ORDER — ONDANSETRON 8 MG/1
4 TABLET, FILM COATED ORAL EVERY 6 HOURS
Refills: 0 | Status: DISCONTINUED | OUTPATIENT
Start: 2021-05-28 | End: 2021-06-01

## 2021-05-28 RX ORDER — LABETALOL HCL 100 MG
100 TABLET ORAL
Refills: 0 | Status: DISCONTINUED | OUTPATIENT
Start: 2021-05-28 | End: 2021-06-11

## 2021-05-28 RX ADMIN — CHLORHEXIDINE GLUCONATE 1 APPLICATION(S): 213 SOLUTION TOPICAL at 05:44

## 2021-05-28 RX ADMIN — ONDANSETRON 4 MILLIGRAM(S): 8 TABLET, FILM COATED ORAL at 23:29

## 2021-05-28 RX ADMIN — ONDANSETRON 4 MILLIGRAM(S): 8 TABLET, FILM COATED ORAL at 10:01

## 2021-05-28 RX ADMIN — Medication 100 MILLIGRAM(S): at 11:59

## 2021-05-28 RX ADMIN — ONDANSETRON 4 MILLIGRAM(S): 8 TABLET, FILM COATED ORAL at 05:43

## 2021-05-28 RX ADMIN — Medication 1 MILLIGRAM(S): at 23:30

## 2021-05-28 RX ADMIN — SODIUM CHLORIDE 75 MILLILITER(S): 9 INJECTION, SOLUTION INTRAVENOUS at 07:42

## 2021-05-28 RX ADMIN — GABAPENTIN 100 MILLIGRAM(S): 400 CAPSULE ORAL at 14:29

## 2021-05-28 RX ADMIN — Medication 1000 UNIT(S): at 11:59

## 2021-05-28 RX ADMIN — Medication 1000 MILLIGRAM(S): at 11:59

## 2021-05-28 RX ADMIN — Medication 1 TABLET(S): at 11:59

## 2021-05-28 RX ADMIN — QUETIAPINE FUMARATE 50 MILLIGRAM(S): 200 TABLET, FILM COATED ORAL at 17:21

## 2021-05-28 RX ADMIN — PANTOPRAZOLE SODIUM 40 MILLIGRAM(S): 20 TABLET, DELAYED RELEASE ORAL at 11:59

## 2021-05-28 RX ADMIN — Medication 1 MILLIGRAM(S): at 16:23

## 2021-05-28 RX ADMIN — Medication 100 MILLIGRAM(S): at 17:21

## 2021-05-28 RX ADMIN — ONDANSETRON 4 MILLIGRAM(S): 8 TABLET, FILM COATED ORAL at 17:21

## 2021-05-28 RX ADMIN — ENOXAPARIN SODIUM 40 MILLIGRAM(S): 100 INJECTION SUBCUTANEOUS at 11:59

## 2021-05-28 RX ADMIN — Medication 1 MILLIGRAM(S): at 10:01

## 2021-05-28 RX ADMIN — Medication 1 APPLICATION(S): at 05:43

## 2021-05-28 RX ADMIN — GABAPENTIN 100 MILLIGRAM(S): 400 CAPSULE ORAL at 23:30

## 2021-05-28 RX ADMIN — NALOXEGOL OXALATE 12.5 MILLIGRAM(S): 12.5 TABLET, FILM COATED ORAL at 11:59

## 2021-05-28 RX ADMIN — METHADONE HYDROCHLORIDE 5 MILLIGRAM(S): 40 TABLET ORAL at 17:21

## 2021-05-28 RX ADMIN — SODIUM CHLORIDE 75 MILLILITER(S): 9 INJECTION, SOLUTION INTRAVENOUS at 23:30

## 2021-05-28 RX ADMIN — Medication 1 APPLICATION(S): at 17:16

## 2021-05-28 RX ADMIN — METHADONE HYDROCHLORIDE 5 MILLIGRAM(S): 40 TABLET ORAL at 10:01

## 2021-05-28 NOTE — PROGRESS NOTE ADULT - SUBJECTIVE AND OBJECTIVE BOX
Patient is a 55y old  Male who presents with a chief complaint of SOB (28 May 2021 08:39)  Awake, alert, laying in bed in Mild distress. Tachypneic and Tachycardic. Diaphoretic on ventilator via trach    INTERVAL HPI/OVERNIGHT EVENTS:      VITAL SIGNS:  T(F): 97.3 (05-28-21 @ 12:00)  HR: 132 (05-28-21 @ 12:00)  BP: 176/92 (05-28-21 @ 12:00)  RR: 41 (05-28-21 @ 12:00)  SpO2: 99% (05-28-21 @ 12:00)  Wt(kg): --  I&O's Detail    27 May 2021 07:01  -  28 May 2021 07:00  --------------------------------------------------------  IN:    IV PiggyBack: 100 mL    Lactated Ringers: 975 mL  Total IN: 1075 mL    OUT:    Chest Tube (mL): 50 mL    Incontinent per Condom Catheter (mL): 510 mL    Voided (mL): 350 mL  Total OUT: 910 mL    Total NET: 165 mL      28 May 2021 07:01  -  28 May 2021 12:46  --------------------------------------------------------  IN:    Enteral Tube Flush: 230 mL    Lactated Ringers: 150 mL    Vital1.5: 25 mL  Total IN: 405 mL    OUT:    Jevity 1.5: 0 mL    Voided (mL): 250 mL  Total OUT: 250 mL    Total NET: 155 mL        Mode: standby        REVIEW OF SYSTEMS:    CONSTITUTIONAL:  No fevers, chills, sweats    HEENT:  Eyes:  No diplopia or blurred vision. ENT:  No earache, sore throat or runny nose.    CARDIOVASCULAR:  No pressure, squeezing, tightness, or heaviness about the chest; no palpitations.    RESPIRATORY:  Per HPI    GASTROINTESTINAL:  No abdominal pain, nausea, vomiting or diarrhea.    GENITOURINARY:  No dysuria, frequency or urgency.    NEUROLOGIC:  No paresthesias, fasciculations, seizures or weakness.    PSYCHIATRIC:  No disorder of thought or mood.      PHYSICAL EXAM:    Constitutional: Well developed and nourished  Eyes:Perrla  ENMT: normal  Neck:supple  Respiratory: good air entry  Cardiovascular: S1 S2 regular  Gastrointestinal: Soft, Non tender  Extremities: No edema  Vascular:normal  Neurological:Awake, alert,Ox3  Musculoskeletal:Normal      MEDICATIONS  (STANDING):  ascorbic acid 1000 milliGRAM(s) Oral daily  BACItracin   Ointment 1 Application(s) Topical two times a day  calcium carbonate 1250 mG  + Vitamin D (OsCal 500 + D) 1 Tablet(s) Oral daily  chlorhexidine 2% Cloths 1 Application(s) Topical <User Schedule>  cholecalciferol 1000 Unit(s) Oral daily  clonazePAM  Tablet 1 milliGRAM(s) Oral every 8 hours  enoxaparin Injectable 40 milliGRAM(s) SubCutaneous every 24 hours  gabapentin 100 milliGRAM(s) Oral every 8 hours  iohexol 300 mG (iodine)/mL Oral Solution 30 milliLiter(s) Oral once  labetalol 100 milliGRAM(s) Enteral Tube two times a day  lactated ringers. 1000 milliLiter(s) (75 mL/Hr) IV Continuous <Continuous>  methadone    Tablet 5 milliGRAM(s) Oral every 12 hours  naloxegol 12.5 milliGRAM(s) Oral daily  ondansetron Injectable 4 milliGRAM(s) IV Push every 6 hours  pantoprazole  Injectable 40 milliGRAM(s) IV Push daily  predniSONE   Tablet 20 milliGRAM(s) Oral daily  QUEtiapine 50 milliGRAM(s) Oral two times a day  trimethoprim  40 mG/sulfamethoxazole 200 mG Suspension 160 milliGRAM(s) Oral <User Schedule>    MEDICATIONS  (PRN):  acetaminophen    Suspension .. 650 milliGRAM(s) Oral every 12 hours PRN Temp greater or equal to 38C (100.4F)  ALBUTerol    90 MICROgram(s) HFA Inhaler 2 Puff(s) Inhalation every 6 hours PRN Shortness of Breath and/or Wheezing  artificial  tears Solution 1 Drop(s) Both EYES every 4 hours PRN Dry Eyes  bisacodyl Suppository 10 milliGRAM(s) Rectal daily PRN Constipation  ondansetron Injectable 4 milliGRAM(s) IV Push every 6 hours PRN Nausea and/or Vomiting  sodium chloride 0.9% lock flush 10 milliLiter(s) IV Push every 1 hour PRN Pre/post blood products, medications, blood draw, and to maintain line patency      Allergies    No Known Allergies    Intolerances        LABS:                        8.9    8.41  )-----------( 274      ( 28 May 2021 04:28 )             27.0     05-28    142  |  101  |  16  ----------------------------<  83  3.7   |  30  |  0.89    Ca    8.9      28 May 2021 04:28  Phos  2.8     05-28  Mg     1.8     05-28    TPro  6.6  /  Alb  2.9<L>  /  TBili  0.9  /  DBili  x   /  AST  28  /  ALT  47  /  AlkPhos  82  05-28              CAPILLARY BLOOD GLUCOSE            RADIOLOGY & ADDITIONAL TESTS:    CXR:    Ct scan chest:    ekg;    echo:

## 2021-05-28 NOTE — PROGRESS NOTE ADULT - ASSESSMENT
Assessment and plan: 54 y/o M with no PMH is admitted to ICU for AHRF 2/2 covid19 pna     1. Acute hypoxic respiratory failure  2. ARDS 2/2 Covid pneumonia  3. Pneumothorax  4. Prediabetes  5. Abnormal TSH     Neuro  -Alert and oriented x 3 at baseline   -Off all drips, and calm  -C/w Klonopin to 1mg q8h  -C/w Seroquel 75 mg BID   -Decreased methadone to 10/5/10  - d/c Ativan PRN, fentanyl PRN  -CT head unremarkable   -Low concern for malignant hypothermia, NMS, or serotonin syndrome given waxing/waning and lack of inciting medications    Cardiovascular  # Shock   now off pressors   Clonidine was d/c    Hypertension:  BP was found to be consistently elevated  Was started on labetalol 100mg bid     Pulm  #Acute hypoxic respiratory failure: secondary to Covid19 pneumonia  #ARDS  -Was on HFNC then got intubated 3/29  -Trach 4/22 continues on Vent   - Remdesivir was discontinued due to positive antibodies   - Finished Dexamethasone   - Covid19 PCR negative now, off isolation   - Daily SBT  Tolerated well today, will put on trach collar  Full vent support overnight    # Bacterial Pneumonia  - Stable infiltrate on CXR ,likely developed Bacterial pneumonia   - Completed ABx Zosyn, meropenem, s/p 1 dose of Vancomycin  - MRSA negative from 3/26, 5/18  - Sputum Cx growing few gram negative rods, CRE - Contact isolation  - Taper prednisone to 30 daily (5/11) for possible organizing pneumonia   - C/w bactrim empirically, TIW for PCP PPX  - Secretions from the trach, needs frequent suctions   - Pulm. Dr. Ryan  - ID Dr Anand   - Was found to have fevers 5/17  Was started on vanc and zosyn  MRSA is negative, will d/c vanc  zosyn now dc     #Pneumothorax and pneumomediastinum:   -Thoracic surgery following  -s/p Chest tube placed 4/8 pigtail to wall suction and d/c on 4/15  -On 4/18 chest tube replaced for recurrence of PTX- c/w chest tube to suction     -anterior chest tube placed 5/6 for worsening pneumothorax with resolution of PTX  5/10 CXR with recurrence of apical pneumo - tube adjusted with improvement however still persisting but stable  -Repeat Chest CT shows persistent PTX  -Water seal inferior CT -PTX was increased, readjusted tube as it was kinked.  - Lateral Ct was removed, repeat CXr showed developing of PTX in left lower lobe, resolving slowly  -Surgery was informed, following the case closely  - 5/23:  place CT to waterseal  5/24 reformation of PTX  5/24 CT to suction, CXR improvement  5/26 CT placed on water seal, c/w water seal  5/27 repeat CXR shows stable PTX  5/28 stable PTX      ID  Likely bacterial pneumonia   -leukocytosis improved 9k  -Febrile 100.4 5/18 - last episode  Was started on vanc and zosyn  MRSA is negative, will d/c vanc  dc zosyn 5/22 s/p 5 days of zosyn    Nephro  -Pitting edema to both arms, ecchymosis on right AC, blisters on left arm around brachial area    -Was retaining urine, andrea was placed 5/5  -DC doxazosin for borderline BPs  potassium and phosphorus replaced this morning; monitor electrolytes     JARRED:  Patient has elevated creatinine compared to baseline, creatinine clearance is trending up.  Will start on mild IV hydration  ua shows proteinuria, triple phosphate crystals, large blood >50 rbc  FENA 6.6%, post obstructive  Monitor BMP    GI  Transaminitis:   likely secondary to Covid19, Resolved   hepatitis panel -ve  -continue to monitor LFT    Ileus  Abd xray with ileus, CT showing the same 5/15  Restart trickle feeds  G Tube off suction now  Rectal tube in place  C/w Miralax BID standing and reglan   G tube 4/23, - dressing changed daily for seroma  Patient was found to have ileus again on 5/20  Was started on lactulose suppository  slowly start on tube feeds  Cdiff negative  Start movantik    Vomiting  Patient had multiple episodes of vomiting for past 24 htrs  Was given zofran  Tube feeds were held  Xray abdomen showed distension, likely worsening of ileus  Will do CT abdomen with IV contrast.  Surgery was informed  f/u repeat Xray abdomen 5/28      Heme  Elevated d-dimer: likely secondary to Covid19   -D-dimer 423 on admission  -Last D-dimer 1434  - No active bleeding, restarted lovenox daily    Anemia  S/p 4 PRBC total  - Now Hg stable 7-9  CTH and CT abdomen w/o contrast no evidence of bleed    No active signs of bleeding (No hematemesis, melena or hematuria)  Hemolysis w/u- negative  Iron studies show ACD  Dr Andrade on board   F/u CBC daily     Endocrine  Prediabetes:  -A1c 5.8  -BS controlled  -continue HSS    Abnormal TSH:  -TSH level noted 0.26,   -Repeat TSH 0.37 and Free T4 1.82    Skin/Catheters  No rashes. Peripheral IV lines.   Both arms with edema, right AC with ecchymosis  doppler of LUE was negative    Prophylaxis   On Lovenox for DVT   Protonix for GI proph    GOC  FULL CODE

## 2021-05-28 NOTE — CHART NOTE - NSCHARTNOTEFT_GEN_A_CORE
Assessment:   Patient is a 55y old  Male who presents with a chief complaint of SOB (28 May 2021 12:46). TF was changed to Vital AF 1.2 @10 ml/hr. Pt seen, this PM, observed TF in progress          Diet Prescription: Diet, NPO with Tube Feed:   Tube Feeding Modality: Gastrostomy  Vital AF 1.2 Leonardo  Total Volume for 24 Hours (mL): 240  Continuous  Starting Tube Feed Rate {mL per Hour}: 10  Until Goal Tube Feed Rate (mL per Hour): 10  Tube Feed Duration (in Hours): 24  Tube Feed Start Time: 10:00 (21 @ 09:34)        Daily     Daily Weight in k.5 (28 May 2021 08:00)  Weight in k.8 (27 May 2021 08:00)  Weight in k.3 (25 May 2021 08:00)  Weight in k.3 (24 May 2021 07:00)  Weight in k.6 (23 May 2021 08:00)  Weight in k.9 (22 May 2021 08:00)  Weight in k.4 (21 May 2021 08:00)  Weight in k.6 (20 May 2021 07:00)  Weight in k.8 (18 May 2021 07:00)  Weight in k.1 (17 May 2021 07:00)  Weight in k.7 (16 May 2021 07:00)  Weight in k.5 (15 May 2021 08:00)        Pertinent Medications: MEDICATIONS  (STANDING):  ascorbic acid 1000 milliGRAM(s) Oral daily  BACItracin   Ointment 1 Application(s) Topical two times a day  calcium carbonate 1250 mG  + Vitamin D (OsCal 500 + D) 1 Tablet(s) Oral daily  chlorhexidine 2% Cloths 1 Application(s) Topical <User Schedule>  cholecalciferol 1000 Unit(s) Oral daily  clonazePAM  Tablet 1 milliGRAM(s) Oral every 8 hours  enoxaparin Injectable 40 milliGRAM(s) SubCutaneous every 24 hours  gabapentin 100 milliGRAM(s) Oral every 8 hours  iohexol 300 mG (iodine)/mL Oral Solution 30 milliLiter(s) Oral once  labetalol 100 milliGRAM(s) Enteral Tube two times a day  lactated ringers. 1000 milliLiter(s) (75 mL/Hr) IV Continuous <Continuous>  methadone    Tablet 5 milliGRAM(s) Oral every 12 hours  naloxegol 12.5 milliGRAM(s) Oral daily  ondansetron Injectable 4 milliGRAM(s) IV Push every 6 hours  pantoprazole  Injectable 40 milliGRAM(s) IV Push daily  predniSONE   Tablet 20 milliGRAM(s) Oral daily  QUEtiapine 50 milliGRAM(s) Oral two times a day  trimethoprim  40 mG/sulfamethoxazole 200 mG Suspension 160 milliGRAM(s) Oral <User Schedule>    MEDICATIONS  (PRN):  acetaminophen    Suspension .. 650 milliGRAM(s) Oral every 12 hours PRN Temp greater or equal to 38C (100.4F)  ALBUTerol    90 MICROgram(s) HFA Inhaler 2 Puff(s) Inhalation every 6 hours PRN Shortness of Breath and/or Wheezing  artificial  tears Solution 1 Drop(s) Both EYES every 4 hours PRN Dry Eyes  bisacodyl Suppository 10 milliGRAM(s) Rectal daily PRN Constipation  ondansetron Injectable 4 milliGRAM(s) IV Push every 6 hours PRN Nausea and/or Vomiting  sodium chloride 0.9% lock flush 10 milliLiter(s) IV Push every 1 hour PRN Pre/post blood products, medications, blood draw, and to maintain line patency    Pertinent Labs:  Na142 mmol/L Glu 83 mg/dL K+ 3.7 mmol/L Cr  0.89 mg/dL BUN 16 mg/dL - Phos 2.8 mg/dL - Alb 2.9 g/dL<L>      Previous Nutrition Diagnosis:   [ ] Altered GI function  [ ]Inadequate Oral Intake [ ] Swallowing Difficulty   [ ] Altered nutrition related labs [ ] Increased Nutrient Needs [ ] Overweight/Obesity   [ ] Unintended Weight Loss [ ] Food & Nutrition Related Knowledge Deficit [x ] Malnutrition (severe)  [ ] Other:     Nutrition Diagnosis is [x ] ongoing  [ ] resolved [ ] not applicable         Interventions:   Recommend  [ ] Change Diet To:  [ ] Nutrition Supplement  [x ] Nutrition Support: With Vital AF 1.2 consider slow increases (10 ml/hr) over several hours, as tolerated, to an initial goal of 50 ml/hr x24 hrs (1200ml, 1400 kcals, 90 gm protein). MD to monitor. RD available.   [ ] Other:     Monitoring and Evaluation:    [ x ] Tolerance to diet prescription [ x ] weights [ x ] labs[ x ] follow up per protocol  [ ] other:

## 2021-05-28 NOTE — PROGRESS NOTE ADULT - SUBJECTIVE AND OBJECTIVE BOX
Patient is a 55y old  Male who presents with a chief complaint of SOB (27 May 2021 10:28)    pt seen in icu [ x ], reg med floor [   ], bed [ x ], chair at bedside [   ], awake and responsive [ x ], mildly sedated, [  ],    nad [x  ]      Allergies    No Known Allergies        Vitals    T(F): 99.1 (05-28-21 @ 06:00), Max: 99.1 (05-27-21 @ 23:58)  HR: 75 (05-28-21 @ 06:00) (68 - 133)  BP: 170/79 (05-28-21 @ 06:00) (137/80 - 180/89)  RR: 22 (05-28-21 @ 06:00) (14 - 37)  SpO2: 100% (05-28-21 @ 06:00) (88% - 100%)  Wt(kg): --  CAPILLARY BLOOD GLUCOSE      POCT Blood Glucose.: 99 mg/dL (27 May 2021 12:06)      Labs                          8.9    8.41  )-----------( 274      ( 28 May 2021 04:28 )             27.0       05-28    142  |  101  |  16  ----------------------------<  83  3.7   |  30  |  0.89    Ca    8.9      28 May 2021 04:28  Phos  2.8     05-28  Mg     1.8     05-28    TPro  6.6  /  Alb  2.9<L>  /  TBili  0.9  /  DBili  x   /  AST  28  /  ALT  47  /  AlkPhos  82  05-28            .Sputum Sputum  04-16 @ 04:42   Moderate Enterobacter aerogenes (Carbapenem Resistant)  Normal Respiratory Monserrat present  --  Enterobacter aerogenes (Carbapenem Resistant)      .Urine Clean Catch (Midstream)  03-15 @ 00:52   No growth  --  --          Radiology Results      Meds    MEDICATIONS  (STANDING):  ascorbic acid 1000 milliGRAM(s) Oral daily  BACItracin   Ointment 1 Application(s) Topical two times a day  bisacodyl Suppository 10 milliGRAM(s) Rectal daily  calcium carbonate 1250 mG  + Vitamin D (OsCal 500 + D) 1 Tablet(s) Oral daily  chlorhexidine 2% Cloths 1 Application(s) Topical <User Schedule>  cholecalciferol 1000 Unit(s) Oral daily  clonazePAM  Tablet 1 milliGRAM(s) Oral every 8 hours  enoxaparin Injectable 40 milliGRAM(s) SubCutaneous every 24 hours  gabapentin 100 milliGRAM(s) Oral every 8 hours  iohexol 300 mG (iodine)/mL Oral Solution 30 milliLiter(s) Oral once  labetalol 100 milliGRAM(s) Enteral Tube two times a day  lactated ringers. 1000 milliLiter(s) (75 mL/Hr) IV Continuous <Continuous>  methadone    Tablet 5 milliGRAM(s) Oral every 12 hours  naloxegol 12.5 milliGRAM(s) Oral daily  pantoprazole  Injectable 40 milliGRAM(s) IV Push daily  predniSONE   Tablet 20 milliGRAM(s) Oral daily  QUEtiapine 50 milliGRAM(s) Oral two times a day  sodium chloride 0.9%. 1000 milliLiter(s) (75 mL/Hr) IV Continuous <Continuous>  trimethoprim  40 mG/sulfamethoxazole 200 mG Suspension 160 milliGRAM(s) Oral <User Schedule>      MEDICATIONS  (PRN):  acetaminophen    Suspension .. 650 milliGRAM(s) Oral every 12 hours PRN Temp greater or equal to 38C (100.4F)  ALBUTerol    90 MICROgram(s) HFA Inhaler 2 Puff(s) Inhalation every 6 hours PRN Shortness of Breath and/or Wheezing  artificial  tears Solution 1 Drop(s) Both EYES every 4 hours PRN Dry Eyes  ondansetron Injectable 4 milliGRAM(s) IV Push every 6 hours PRN Nausea and/or Vomiting  sodium chloride 0.9% lock flush 10 milliLiter(s) IV Push every 1 hour PRN Pre/post blood products, medications, blood draw, and to maintain line patency      Physical Exam    Neuro :  no focal deficits  Respiratory: CTA B/L  CV: RRR, S1S2, no murmurs,   Abdominal: Soft, NT, ND +BS, gastrostomy tube inplace  Extremities: edema of extrem, + peripheral pulses      ASSESSMENT      Hypoxemia 2nd to covid pna   transaminitis  prediabetes  h/o appendectomy  cholecystectomy        PLAN    cont cont precautions  covid 5/14/21 neg noted above  d/c remdesevir given covid ab positive noted   completed dexamethasone   started pulse steroids for 3 days - 250mg solumedrol bid now tapered off 4/14/21   C/w prednisone 20 daily    cont on bactrim empirically, TIW   cont asa, vit c,    cont albuterol inhaler   pulm f/u  procalcitonin, D-dimer, crp, ldh, ferritin, lactate noted ,    cont tylenol prn,   cont robitussin prn   pt off precedex    pt on methadone   pain mgmt eval noted   off phenylephrine drip for hypotension  clonidine held 2nd to low bp  s/p intubation 3/29/21   s/p tracheostomy 4/21/21  O2 sat  100% (95% - 100%) mv 40%  O2 via mech vent and taper fio2 as tolerated   vent mgmt as per icu  xray 3/19/21 with pneumomediastinum  rept cxr with New trace right apical pneumothorax. New mild left apical pneumothorax. Grossly stable small pneumomediastinum.  Soft tissue emphysema at the neck bases bilaterally. Grossly stable bilateral pulmonary infiltrates noted.   cxr 2/24 with No evidence of pneumothorax can be appreciated on the available image. This may be related to patient positioning. Evidence of pneumomediastinum and subcutaneous emphysema in the lower neck is again   noted. There are patchy bibasilar infiltrates and elevated right hemidiaphragm noted.   cxr 3/29/21 with No significant change bilateral infiltrates. There is a small simple left apical pneumothorax. No significant pleural effusion. Bilateral subcutaneous emphysema similar to prior.   XRs on 7AM and 5PM with no obvious increase of ptx  cxr 4/4/21 with Improving bilateral airspace disease noted    cxr 4/8/21 with Small left pneumothorax noted.  thoracic surg f/u   s/p L chest tube replacement 4/18/21 for recurrence of left pneumothorax   cxr 4/20/21 with Unchanged advanced infiltrates and catheter left chest tube noted   CXR 4/11/21 with : No interval change compared to one day prior. No pneumothorax noted.   unable to flush or aspirate tube fully, noted debris in tubing which was milked out.   Once material removed from tubing able to aspirated and flush fully. Also changed dressing on pigtail which appears to be in good position.   Monitor O2 status   s/p tracheostomy 4/21/21   cxr 4/21/21 with No evidence of active chest disease. Tracheostomy tube in place otherwise no significant change noted   cxr 4/25/21 with A 40% LEFT pneumothorax. LEFT multi-sidehole pigtail catheter overlies LEFT lower hemithorax.. Bilateral multifocal and diffuse ill-defined airspace opacities..  Follow-up AP portable chest radiograph 4/25/2021 AT 8:58 AM: Residual LEFT 30% upper zone pneumothorax. Otherwise no interval change.noted    cxr 4/30/21 Tracheostomy tube again noted. Left chest tube noted with small left apical pneumothorax unchanged. Bilateral airspace opacities overall worsening. Heart size cannot be accurately assessed in this projection noted.   cxr 5/3/21 with Large left pneumothorax noted above.   cxr 5 4/21 with No significant interval change noted above.   ct chest with Extensive chronic appearing consolidative opacities throughout the lungs, most prominent in the left lower lobe and lingula, with bronchiectasis, scarring, and volume loss. Additional scattered peripheral coarse opacities.   Mild left pneumothorax. Left lateral pleural space pigtail catheter, not in continuity with the pneumothorax. Mild bilateral hydronephrosis, similar to appearance on CT of the abdomen and pelvis on April 27. noted above   s/p 2nd chest tube 5/5/21  cxr 5/6/21 with Tracheostomy and catheter left chest tubes remain. , There are significant diffuse advanced infiltrates again noted. No pneumothorax. Above findings are similar to study earlier in the day. Present film shows a left jugular line inserted   with tip entering the superior vena cava noted   cxr 5/11/21 with Heart magnified by technique. Tracheostomy, left jugular line, and 2 catheter left chest tubes remain. Quite advanced infiltration in the lungs particularly in the left lower lobe again noted.  There is a persistent rather small left apical pneumothorax. Chest is similar to May 10 noted    cxt 5/14/21 with Perihilar diffuse airspace disease and LEFT lower lobe retrocardiac airspace consolidation. LEFT chest tube overlies upper zone. Small LEFT apical less than 5% pneumothorax noted   cxr 5/17/21 with Similar infiltrates. Small left pneumothorax similar to the prior study noted.   cxr 5/19/21 with Persistent mild to moderate left pneumothorax despite chest tube noted   cxr 5/20 with Improved left pneumothorax. Significant infiltration particularly in the left lower lobe again noted   cxr 5/21/21 with No appreciable pneumothorax at this time. Persistent advanced infiltrates noted    cxr 5/22/21 with  Left chest pigtail. Status post tracheostomy. Heart size unremarkable. No pneumothorax. Multiple patchy opacities throughout the left lung. Status post gastrostomy. No dilated loops of bowel noted above.   cxr 5/23/21 with Tracheostomy tube and within the left pigtail catheter are unchanged. Stable hazy opacities, left greater than the right. No pneumothorax noted above.   cxr 5/26/21 with Left lower lobe infiltrate lateral left lung infiltrate again seen. Rather small left apical pneumothorax again noted and small right upper lobe infiltrate again seen. Chest is similar to earlier in the day.noted above.  s/p surgical placement of gastrostomy tube 4/23/2021.   abd xray 5/10/21 with ileus noted   dressing changed daily for seroma   abd xray 5/21/21 with Decreased bowel ileus compared to prior 5/20/2021 exam noted above.   abd xray 5/22/21 with No dilated loops of bowel noted above.   cont tube feeding   CT scan of the chest 5/13/21 demonstrates diffuse bilateral infiltrates with a region of consolidation at the left lung base. Small left pneumothorax. 2 left chest tubes noted in place noted above.  CT scan of the abdomen and pelvis 5/13/21 demonstrates diffuse dilatation of small and large bowel loops most consistent with ileus noted   xray abd with  5/14/21 with Ingested contrast within nonobstructed large bowel to the level of rectum. No acute radiographic intra-abdominal findings noted above.  id f/u   patient completed course of Meropenem  leukocytosis resolved  sputum cx with Enterobacter aerogenes (Carbapenem Resistant) noted above   tmx 100.1     andrea changed  Completed  meropenem, s/p 1 dose of Vancomycin   completed zosyn  lispro ss   prognosis poor   s/p 4 units prbc for anemia  f/u h/h    transfuse prbc as needed  heme onc f/u  retic is elevated.    Haptoglobin normal  ? daily phlebotomy  no hemolysis, Fe/B12/folate adequate  hemolysis is unlikely even his baseline haptoglobin may be very elevated due to COVID  direct pamella neg noted    psych f/u  C/w Klonopin to 1 mg q8h standing (no PRNs), with plan to further taper as tolerated  cont Seroquel to 50 mg BID standing starting tonight  Continue delirium precautions: Frequent reorientation, familiar pictures and objects at bedside, natural light in daytime,   consistent sleep/wake schedule, adequate hydration and nutrition, sensory aids (hearing aids, glasses) present if needed, minimizing noise and overstimulation,   clustering care to minimize overnight interruptions, judicious use of deliriogenic medications (anticholinergics, benzodiazepines and opioid analgesics), minimize use of restraints  cont current meds   mgmt as per icu           Patient is a 55y old  Male who presents with a chief complaint of SOB (27 May 2021 10:28)    pt seen in icu [ x ], reg med floor [   ], bed [ x ], chair at bedside [   ], awake and responsive [ x ], mildly sedated, [  ],    nad [x  ]      Allergies    No Known Allergies        Vitals    T(F): 99.1 (05-28-21 @ 06:00), Max: 99.1 (05-27-21 @ 23:58)  HR: 75 (05-28-21 @ 06:00) (68 - 133)  BP: 170/79 (05-28-21 @ 06:00) (137/80 - 180/89)  RR: 22 (05-28-21 @ 06:00) (14 - 37)  SpO2: 100% (05-28-21 @ 06:00) (88% - 100%)  Wt(kg): --  CAPILLARY BLOOD GLUCOSE      POCT Blood Glucose.: 99 mg/dL (27 May 2021 12:06)      Labs                          8.9    8.41  )-----------( 274      ( 28 May 2021 04:28 )             27.0       05-28    142  |  101  |  16  ----------------------------<  83  3.7   |  30  |  0.89    Ca    8.9      28 May 2021 04:28  Phos  2.8     05-28  Mg     1.8     05-28    TPro  6.6  /  Alb  2.9<L>  /  TBili  0.9  /  DBili  x   /  AST  28  /  ALT  47  /  AlkPhos  82  05-28            .Sputum Sputum  04-16 @ 04:42   Moderate Enterobacter aerogenes (Carbapenem Resistant)  Normal Respiratory Monserrat present  --  Enterobacter aerogenes (Carbapenem Resistant)      .Urine Clean Catch (Midstream)  03-15 @ 00:52   No growth  --  --          Radiology Results    < from: CT Abdomen and Pelvis w/ IV Cont (05.27.21 @ 16:22) >  FINDINGS:  LOWER CHEST: The left basilar pigtail drainage catheter has been removed. There is improved left lower lobe consolidation with residual volume loss and bronchiectasis. There is trace residual pneumothorax at the left lung base anteriorly and mild pneumomediastinum anteriorly.    LIVER: 8 mm cyst in segment 7 of the liver. Otherwise, within normal limits.  BILE DUCTS: Normal caliber.  GALLBLADDER: Within normal limits.  SPLEEN: Within normal limits.  PANCREAS: Within normal limits.  ADRENALS: Within normal limits.  KIDNEYS/URETERS: There is delayed nephrographic phase enhancement of the bilateral kidneys, suggestive of acute renal insufficiency. There is no hydronephrosis or perinephric stranding bilaterally.    BLADDER: Small bubble of nondependent gas in the minimally thickened bladder.  REPRODUCTIVE ORGANS: The prostate is not enlarged.    BOWEL: There is a G-tube in place. There is moderate concentric wall thickening and mucosal hyperenhancement of the distal rectum, suggestive of proctitis. No pneumatosis or extraluminal gas is identified. Otherwise, small and large bowel loops are unremarkable with no evidence of obstruction or inflammatory stranding of the adjacent mesenteric fat.  PERITONEUM: No ascites.  VESSELS: Within normal limits.  RETROPERITONEUM/LYMPH NODES: No lymphadenopathy.  ABDOMINAL WALL: Within normal limits.  BONES: Within normal limits.    IMPRESSION: Delayed nephrographic phase enhancement of the nonobstructed bilateral kidneys, suggestive of acute renal insufficiency. No evidence of bowel obstruction. Proctitis.    < end of copied text >        Meds    MEDICATIONS  (STANDING):  ascorbic acid 1000 milliGRAM(s) Oral daily  BACItracin   Ointment 1 Application(s) Topical two times a day  bisacodyl Suppository 10 milliGRAM(s) Rectal daily  calcium carbonate 1250 mG  + Vitamin D (OsCal 500 + D) 1 Tablet(s) Oral daily  chlorhexidine 2% Cloths 1 Application(s) Topical <User Schedule>  cholecalciferol 1000 Unit(s) Oral daily  clonazePAM  Tablet 1 milliGRAM(s) Oral every 8 hours  enoxaparin Injectable 40 milliGRAM(s) SubCutaneous every 24 hours  gabapentin 100 milliGRAM(s) Oral every 8 hours  iohexol 300 mG (iodine)/mL Oral Solution 30 milliLiter(s) Oral once  labetalol 100 milliGRAM(s) Enteral Tube two times a day  lactated ringers. 1000 milliLiter(s) (75 mL/Hr) IV Continuous <Continuous>  methadone    Tablet 5 milliGRAM(s) Oral every 12 hours  naloxegol 12.5 milliGRAM(s) Oral daily  pantoprazole  Injectable 40 milliGRAM(s) IV Push daily  predniSONE   Tablet 20 milliGRAM(s) Oral daily  QUEtiapine 50 milliGRAM(s) Oral two times a day  sodium chloride 0.9%. 1000 milliLiter(s) (75 mL/Hr) IV Continuous <Continuous>  trimethoprim  40 mG/sulfamethoxazole 200 mG Suspension 160 milliGRAM(s) Oral <User Schedule>      MEDICATIONS  (PRN):  acetaminophen    Suspension .. 650 milliGRAM(s) Oral every 12 hours PRN Temp greater or equal to 38C (100.4F)  ALBUTerol    90 MICROgram(s) HFA Inhaler 2 Puff(s) Inhalation every 6 hours PRN Shortness of Breath and/or Wheezing  artificial  tears Solution 1 Drop(s) Both EYES every 4 hours PRN Dry Eyes  ondansetron Injectable 4 milliGRAM(s) IV Push every 6 hours PRN Nausea and/or Vomiting  sodium chloride 0.9% lock flush 10 milliLiter(s) IV Push every 1 hour PRN Pre/post blood products, medications, blood draw, and to maintain line patency      Physical Exam    Neuro :  no focal deficits  Respiratory: CTA B/L  CV: RRR, S1S2, no murmurs,   Abdominal: Soft, NT, ND +BS, gastrostomy tube inplace  Extremities: edema of extrem, + peripheral pulses      ASSESSMENT      Hypoxemia 2nd to covid pna   transaminitis  prediabetes  h/o appendectomy  cholecystectomy        PLAN    cont cont precautions  covid 5/14/21 neg noted above  d/c remdesevir given covid ab positive noted   completed dexamethasone   started pulse steroids for 3 days - 250mg solumedrol bid now tapered off 4/14/21   C/w prednisone 20 daily    cont on bactrim empirically, TIW   cont asa, vit c,    cont albuterol inhaler   pulm f/u  procalcitonin, D-dimer, crp, ldh, ferritin, lactate noted ,    cont tylenol prn,   cont robitussin prn   pt off precedex    pt on methadone   pain mgmt eval noted   off phenylephrine drip for hypotension  clonidine held 2nd to low bp  s/p intubation 3/29/21   s/p tracheostomy 4/21/21  O2 sat  100% (88% - 100%) mv 40%  O2 via mech vent and taper fio2 as tolerated   vent mgmt as per icu  xray 3/19/21 with pneumomediastinum  rept cxr with New trace right apical pneumothorax. New mild left apical pneumothorax. Grossly stable small pneumomediastinum.  Soft tissue emphysema at the neck bases bilaterally. Grossly stable bilateral pulmonary infiltrates noted.   cxr 2/24 with No evidence of pneumothorax can be appreciated on the available image. This may be related to patient positioning. Evidence of pneumomediastinum and subcutaneous emphysema in the lower neck is again   noted. There are patchy bibasilar infiltrates and elevated right hemidiaphragm noted.   cxr 3/29/21 with No significant change bilateral infiltrates. There is a small simple left apical pneumothorax. No significant pleural effusion. Bilateral subcutaneous emphysema similar to prior.   XRs on 7AM and 5PM with no obvious increase of ptx  cxr 4/4/21 with Improving bilateral airspace disease noted    cxr 4/8/21 with Small left pneumothorax noted.  thoracic surg f/u   s/p L chest tube replacement 4/18/21 for recurrence of left pneumothorax   cxr 4/20/21 with Unchanged advanced infiltrates and catheter left chest tube noted   CXR 4/11/21 with : No interval change compared to one day prior. No pneumothorax noted.   unable to flush or aspirate tube fully, noted debris in tubing which was milked out.   Once material removed from tubing able to aspirated and flush fully. Also changed dressing on pigtail which appears to be in good position.   Monitor O2 status   s/p tracheostomy 4/21/21   cxr 4/21/21 with No evidence of active chest disease. Tracheostomy tube in place otherwise no significant change noted   cxr 4/25/21 with A 40% LEFT pneumothorax. LEFT multi-sidehole pigtail catheter overlies LEFT lower hemithorax.. Bilateral multifocal and diffuse ill-defined airspace opacities..  Follow-up AP portable chest radiograph 4/25/2021 AT 8:58 AM: Residual LEFT 30% upper zone pneumothorax. Otherwise no interval change.noted    cxr 4/30/21 Tracheostomy tube again noted. Left chest tube noted with small left apical pneumothorax unchanged. Bilateral airspace opacities overall worsening. Heart size cannot be accurately assessed in this projection noted.   cxr 5/3/21 with Large left pneumothorax noted above.   cxr 5 4/21 with No significant interval change noted above.   ct chest with Extensive chronic appearing consolidative opacities throughout the lungs, most prominent in the left lower lobe and lingula, with bronchiectasis, scarring, and volume loss. Additional scattered peripheral coarse opacities.   Mild left pneumothorax. Left lateral pleural space pigtail catheter, not in continuity with the pneumothorax. Mild bilateral hydronephrosis, similar to appearance on CT of the abdomen and pelvis on April 27. noted above   s/p 2nd chest tube 5/5/21  cxr 5/6/21 with Tracheostomy and catheter left chest tubes remain. , There are significant diffuse advanced infiltrates again noted. No pneumothorax. Above findings are similar to study earlier in the day. Present film shows a left jugular line inserted   with tip entering the superior vena cava noted   cxr 5/11/21 with Heart magnified by technique. Tracheostomy, left jugular line, and 2 catheter left chest tubes remain. Quite advanced infiltration in the lungs particularly in the left lower lobe again noted.  There is a persistent rather small left apical pneumothorax. Chest is similar to May 10 noted    cxt 5/14/21 with Perihilar diffuse airspace disease and LEFT lower lobe retrocardiac airspace consolidation. LEFT chest tube overlies upper zone. Small LEFT apical less than 5% pneumothorax noted   cxr 5/17/21 with Similar infiltrates. Small left pneumothorax similar to the prior study noted.   cxr 5/19/21 with Persistent mild to moderate left pneumothorax despite chest tube noted   cxr 5/20 with Improved left pneumothorax. Significant infiltration particularly in the left lower lobe again noted   cxr 5/21/21 with No appreciable pneumothorax at this time. Persistent advanced infiltrates noted    cxr 5/22/21 with  Left chest pigtail. Status post tracheostomy. Heart size unremarkable. No pneumothorax. Multiple patchy opacities throughout the left lung. Status post gastrostomy. No dilated loops of bowel noted above.   cxr 5/23/21 with Tracheostomy tube and within the left pigtail catheter are unchanged. Stable hazy opacities, left greater than the right. No pneumothorax noted above.   cxr 5/26/21 with Left lower lobe infiltrate lateral left lung infiltrate again seen. Rather small left apical pneumothorax again noted and small right upper lobe infiltrate again seen. Chest is similar to earlier in the day.noted above.  s/p surgical placement of gastrostomy tube 4/23/2021.   abd xray 5/10/21 with ileus noted   dressing changed daily for seroma   abd xray 5/21/21 with Decreased bowel ileus compared to prior 5/20/2021 exam noted above.   abd xray 5/22/21 with No dilated loops of bowel noted above.   cont tube feeding   CT scan of the chest 5/13/21 demonstrates diffuse bilateral infiltrates with a region of consolidation at the left lung base. Small left pneumothorax. 2 left chest tubes noted in place noted above.  CT scan of the abdomen and pelvis 5/13/21 demonstrates diffuse dilatation of small and large bowel loops most consistent with ileus noted   xray abd with  5/14/21 with Ingested contrast within nonobstructed large bowel to the level of rectum. No acute radiographic intra-abdominal findings noted   ct abd-pelv 5/27/21 with Delayed nephrographic phase enhancement of the nonobstructed bilateral kidneys, suggestive of acute renal insufficiency. No evidence of bowel obstruction. Proctitis noted above.   id f/u   patient completed course of Meropenem  leukocytosis resolved  sputum cx with Enterobacter aerogenes (Carbapenem Resistant) noted above   tmx 99.1     andrea changed  Completed  meropenem, s/p 1 dose of Vancomycin   completed zosyn  lispro ss   prognosis poor   s/p 4 units prbc for anemia  f/u h/h    transfuse prbc as needed  heme onc f/u  retic is elevated.    Haptoglobin normal  ? daily phlebotomy  no hemolysis, Fe/B12/folate adequate  hemolysis is unlikely even his baseline haptoglobin may be very elevated due to COVID  direct pamella neg noted    psych f/u  C/w Klonopin to 1 mg q8h standing (no PRNs), with plan to further taper as tolerated  cont Seroquel to 50 mg BID standing starting tonight  Continue delirium precautions: Frequent reorientation, familiar pictures and objects at bedside, natural light in daytime,   consistent sleep/wake schedule, adequate hydration and nutrition, sensory aids (hearing aids, glasses) present if needed, minimizing noise and overstimulation,   clustering care to minimize overnight interruptions, judicious use of deliriogenic medications (anticholinergics, benzodiazepines and opioid analgesics), minimize use of restraints  cont current meds   mgmt as per icu

## 2021-05-28 NOTE — PROGRESS NOTE ADULT - SUBJECTIVE AND OBJECTIVE BOX
INTERVAL HPI/OVERNIGHT EVENTS: CT abdomen was negative, Patient had mild elevated Temp 99. Has been nauseous all night. Was started on IVF as he was not given feeds. Placed him on trach collar this morning.    PRESSORS: [ ] YES [ x] NO  WHICH:    ANTIBIOTICS:                      Antimicrobial:  trimethoprim  40 mG/sulfamethoxazole 200 mG Suspension 160 milliGRAM(s) Oral <User Schedule>    Cardiovascular:  labetalol 100 milliGRAM(s) Enteral Tube two times a day    Pulmonary:  ALBUTerol    90 MICROgram(s) HFA Inhaler 2 Puff(s) Inhalation every 6 hours PRN    Hematalogic:  enoxaparin Injectable 40 milliGRAM(s) SubCutaneous every 24 hours    Other:  acetaminophen    Suspension .. 650 milliGRAM(s) Oral every 12 hours PRN  artificial  tears Solution 1 Drop(s) Both EYES every 4 hours PRN  ascorbic acid 1000 milliGRAM(s) Oral daily  BACItracin   Ointment 1 Application(s) Topical two times a day  bisacodyl Suppository 10 milliGRAM(s) Rectal daily  calcium carbonate 1250 mG  + Vitamin D (OsCal 500 + D) 1 Tablet(s) Oral daily  chlorhexidine 2% Cloths 1 Application(s) Topical <User Schedule>  cholecalciferol 1000 Unit(s) Oral daily  clonazePAM  Tablet 1 milliGRAM(s) Oral every 8 hours  gabapentin 100 milliGRAM(s) Oral every 8 hours  iohexol 300 mG (iodine)/mL Oral Solution 30 milliLiter(s) Oral once  lactated ringers. 1000 milliLiter(s) IV Continuous <Continuous>  methadone    Tablet 5 milliGRAM(s) Oral every 12 hours  naloxegol 12.5 milliGRAM(s) Oral daily  ondansetron Injectable 4 milliGRAM(s) IV Push every 6 hours PRN  pantoprazole  Injectable 40 milliGRAM(s) IV Push daily  predniSONE   Tablet 20 milliGRAM(s) Oral daily  QUEtiapine 50 milliGRAM(s) Oral two times a day  sodium chloride 0.9% lock flush 10 milliLiter(s) IV Push every 1 hour PRN    acetaminophen    Suspension .. 650 milliGRAM(s) Oral every 12 hours PRN  ALBUTerol    90 MICROgram(s) HFA Inhaler 2 Puff(s) Inhalation every 6 hours PRN  artificial  tears Solution 1 Drop(s) Both EYES every 4 hours PRN  ascorbic acid 1000 milliGRAM(s) Oral daily  BACItracin   Ointment 1 Application(s) Topical two times a day  bisacodyl Suppository 10 milliGRAM(s) Rectal daily  calcium carbonate 1250 mG  + Vitamin D (OsCal 500 + D) 1 Tablet(s) Oral daily  chlorhexidine 2% Cloths 1 Application(s) Topical <User Schedule>  cholecalciferol 1000 Unit(s) Oral daily  clonazePAM  Tablet 1 milliGRAM(s) Oral every 8 hours  enoxaparin Injectable 40 milliGRAM(s) SubCutaneous every 24 hours  gabapentin 100 milliGRAM(s) Oral every 8 hours  iohexol 300 mG (iodine)/mL Oral Solution 30 milliLiter(s) Oral once  labetalol 100 milliGRAM(s) Enteral Tube two times a day  lactated ringers. 1000 milliLiter(s) IV Continuous <Continuous>  methadone    Tablet 5 milliGRAM(s) Oral every 12 hours  naloxegol 12.5 milliGRAM(s) Oral daily  ondansetron Injectable 4 milliGRAM(s) IV Push every 6 hours PRN  pantoprazole  Injectable 40 milliGRAM(s) IV Push daily  predniSONE   Tablet 20 milliGRAM(s) Oral daily  QUEtiapine 50 milliGRAM(s) Oral two times a day  sodium chloride 0.9% lock flush 10 milliLiter(s) IV Push every 1 hour PRN  trimethoprim  40 mG/sulfamethoxazole 200 mG Suspension 160 milliGRAM(s) Oral <User Schedule>    Drug Dosing Weight  Height (cm): 167.6 (23 Apr 2021 23:15)  Weight (kg): 83.9 (23 Apr 2021 23:15)  BMI (kg/m2): 29.9 (23 Apr 2021 23:15)  BSA (m2): 1.93 (23 Apr 2021 23:15)    CENTRAL LINE: [ ] YES [ x] NO  LOCATION:   DATE INSERTED:  REMOVE: [ ] YES [ ] NO  EXPLAIN:    RIVERA: [ ] YES [x ] NO    DATE INSERTED:  REMOVE:  [ ] YES [ ] NO  EXPLAIN:    A-LINE:  [ ] YES [x ] NO  LOCATION:   DATE INSERTED:  REMOVE:  [ ] YES [ ] NO  EXPLAIN:    PMH -reviewed admission note, no change since admission    ICU Vital Signs Last 24 Hrs  T(C): 37.1 (28 May 2021 08:00), Max: 37.3 (27 May 2021 23:58)  T(F): 98.7 (28 May 2021 08:00), Max: 99.1 (27 May 2021 23:58)  HR: 81 (28 May 2021 08:06) (68 - 133)  BP: 171/79 (28 May 2021 08:00) (137/80 - 180/89)  BP(mean): 100 (28 May 2021 08:00) (89 - 111)  RR: 22 (28 May 2021 08:00) (14 - 37)  SpO2: 99% (28 May 2021 08:29) (88% - 100%)            05-27 @ 07:01  -  05-28 @ 07:00  --------------------------------------------------------  IN: 1075 mL / OUT: 910 mL / NET: 165 mL        Mode: AC/ CMV (Assist Control/ Continuous Mandatory Ventilation)  RR (machine): 22  TV (machine): 450  FiO2: 40  PEEP: 5  ITime: 1  MAP: 12  PIP: 34      PHYSICAL EXAM:    GENERAL: Nauseous  HEAD:  Atraumatic, Normocephalic  EYES: EOMI, PERRLA, conjunctiva and sclera clear  ENMT: No tonsillar erythema, exudates, or enlargement; Moist mucous membranes, Good dentition, No lesions  NECK: Supple, normal appearance, No JVD; Normal thyroid; Trachea midline  NERVOUS SYSTEM:  Alert & Oriented, obeying commands  CHEST/LUNG: No chest deformity; Normal percussion bilaterally; No rales, rhonchi, wheezing, CT to water seal  HEART: Regular rate and rhythm; No murmurs, rubs, or gallops  ABDOMEN: Soft, Nontender, Nondistended; Bowel sounds present  EXTREMITIES:  2+ Peripheral Pulses, No clubbing, cyanosis, or edema  LYMPH: No lymphadenopathy noted  SKIN: No rashes or lesions; Good capillary refill        LABS:  CBC Full  -  ( 28 May 2021 04:28 )  WBC Count : 8.41 K/uL  RBC Count : 2.67 M/uL  Hemoglobin : 8.9 g/dL  Hematocrit : 27.0 %  Platelet Count - Automated : 274 K/uL  Mean Cell Volume : 101.1 fl  Mean Cell Hemoglobin : 33.3 pg  Mean Cell Hemoglobin Concentration : 33.0 gm/dL  Auto Neutrophil # : x  Auto Lymphocyte # : x  Auto Monocyte # : x  Auto Eosinophil # : x  Auto Basophil # : x  Auto Neutrophil % : x  Auto Lymphocyte % : x  Auto Monocyte % : x  Auto Eosinophil % : x  Auto Basophil % : x    05-28    142  |  101  |  16  ----------------------------<  83  3.7   |  30  |  0.89    Ca    8.9      28 May 2021 04:28  Phos  2.8     05-28  Mg     1.8     05-28    TPro  6.6  /  Alb  2.9<L>  /  TBili  0.9  /  DBili  x   /  AST  28  /  ALT  47  /  AlkPhos  82  05-28            RADIOLOGY & ADDITIONAL STUDIES REVIEWED:  < from: CT Abdomen and Pelvis w/ IV Cont (05.27.21 @ 16:22) >  IMPRESSION: Delayed nephrographic phase enhancement of the nonobstructed bilateral kidneys, suggestive of acute renal insufficiency. No evidence of bowel obstruction. Proctitis.    <      GOALS OF CARE DISCUSSION WITH PATIENT/FAMILY/PROXY:    CRITICAL CARE TIME SPENT: 35 minutes

## 2021-05-28 NOTE — PROGRESS NOTE ADULT - PROBLEM SELECTOR PLAN 1
isolation precautions DC  Cont  full mechanical ventilation support  Wean as tolerated  SBT daily as tolerated  Tracheal care  Decubitus prevention  Supplemental nutrition  ICU management.   Monitor oxygen sat  Monitor LFT, LDH, CRP, D-Dimer, Ferritin and procalcitonin  Vit C, D and zinc supp  Montelukast 10 mgs po Qhs  Daily CXR   G-tube with feeds  Replace lytes  Pulmonary toilet  DVT and GI PPX.

## 2021-05-28 NOTE — PROGRESS NOTE ADULT - ATTENDING COMMENTS
55 yr old  man , non smoker with  moody 1990s presented 3/14 with x9 days worsening cough, subjective fevers, and SOB, with x2-3 days dysuria and central, non-radiating, constant CP. Admitted to medicine unit  for acute hypoxic respiratory failure secondary to pna from covid-19 infection .     Assessment:  1. Acute hypoxic respiratory failure  2. Covid-19 infection   3. Transaminitis  4. Prediabetes  5. Bilateral pneumothorax  6. Septic shock - resolved  7. Anemia  8. bowel obstruction/ileus    Plan   - No more episodes of vomiting. CT scan with out evidence of obstruction.   - Will start low rate tube feedings, change formula  - Zofran for nausea control  -Chest tube to water seal this morning  -Small PTX noted at left apex  -S/p tracheostomy placement 4/21   -S/p G-tube 4/23  -trach collar during day time, full vent support overnight as tolerated  -PT evaluation, upright positioning as tolerated  -Tapering dose of steroids as ordered, bactrim for PCP prophylaxis   -C-diff PCR negative  -Cont. movantik, lowered dose due to vomiting,  -dvt/gi prophy  -hemodynamic monitoring   -Prognosis is guarded.

## 2021-05-29 DIAGNOSIS — K56.7 ILEUS, UNSPECIFIED: ICD-10-CM

## 2021-05-29 LAB
ALBUMIN SERPL ELPH-MCNC: 2.6 G/DL — LOW (ref 3.5–5)
ALP SERPL-CCNC: 72 U/L — SIGNIFICANT CHANGE UP (ref 40–120)
ALT FLD-CCNC: 37 U/L DA — SIGNIFICANT CHANGE UP (ref 10–60)
ANION GAP SERPL CALC-SCNC: 8 MMOL/L — SIGNIFICANT CHANGE UP (ref 5–17)
AST SERPL-CCNC: 23 U/L — SIGNIFICANT CHANGE UP (ref 10–40)
BILIRUB SERPL-MCNC: 0.7 MG/DL — SIGNIFICANT CHANGE UP (ref 0.2–1.2)
BUN SERPL-MCNC: 16 MG/DL — SIGNIFICANT CHANGE UP (ref 7–18)
CALCIUM SERPL-MCNC: 8.6 MG/DL — SIGNIFICANT CHANGE UP (ref 8.4–10.5)
CHLORIDE SERPL-SCNC: 103 MMOL/L — SIGNIFICANT CHANGE UP (ref 96–108)
CO2 SERPL-SCNC: 30 MMOL/L — SIGNIFICANT CHANGE UP (ref 22–31)
CREAT SERPL-MCNC: 0.76 MG/DL — SIGNIFICANT CHANGE UP (ref 0.5–1.3)
GLUCOSE SERPL-MCNC: 82 MG/DL — SIGNIFICANT CHANGE UP (ref 70–99)
HCT VFR BLD CALC: 25.7 % — LOW (ref 39–50)
HGB BLD-MCNC: 8.4 G/DL — LOW (ref 13–17)
MAGNESIUM SERPL-MCNC: 1.8 MG/DL — SIGNIFICANT CHANGE UP (ref 1.6–2.6)
MCHC RBC-ENTMCNC: 32.7 GM/DL — SIGNIFICANT CHANGE UP (ref 32–36)
MCHC RBC-ENTMCNC: 34.4 PG — HIGH (ref 27–34)
MCV RBC AUTO: 105.3 FL — HIGH (ref 80–100)
NRBC # BLD: 0 /100 WBCS — SIGNIFICANT CHANGE UP (ref 0–0)
PHOSPHATE SERPL-MCNC: 2.5 MG/DL — SIGNIFICANT CHANGE UP (ref 2.5–4.5)
PLATELET # BLD AUTO: 240 K/UL — SIGNIFICANT CHANGE UP (ref 150–400)
POTASSIUM SERPL-MCNC: 3.4 MMOL/L — LOW (ref 3.5–5.3)
POTASSIUM SERPL-SCNC: 3.4 MMOL/L — LOW (ref 3.5–5.3)
PROT SERPL-MCNC: 5.9 G/DL — LOW (ref 6–8.3)
RBC # BLD: 2.44 M/UL — LOW (ref 4.2–5.8)
RBC # FLD: 17 % — HIGH (ref 10.3–14.5)
SODIUM SERPL-SCNC: 141 MMOL/L — SIGNIFICANT CHANGE UP (ref 135–145)
WBC # BLD: 8.04 K/UL — SIGNIFICANT CHANGE UP (ref 3.8–10.5)
WBC # FLD AUTO: 8.04 K/UL — SIGNIFICANT CHANGE UP (ref 3.8–10.5)

## 2021-05-29 PROCEDURE — 71045 X-RAY EXAM CHEST 1 VIEW: CPT | Mod: 26

## 2021-05-29 RX ORDER — POTASSIUM CHLORIDE 20 MEQ
40 PACKET (EA) ORAL ONCE
Refills: 0 | Status: COMPLETED | OUTPATIENT
Start: 2021-05-29 | End: 2021-05-29

## 2021-05-29 RX ADMIN — SODIUM CHLORIDE 75 MILLILITER(S): 9 INJECTION, SOLUTION INTRAVENOUS at 23:19

## 2021-05-29 RX ADMIN — ONDANSETRON 4 MILLIGRAM(S): 8 TABLET, FILM COATED ORAL at 11:53

## 2021-05-29 RX ADMIN — Medication 40 MILLIEQUIVALENT(S): at 09:54

## 2021-05-29 RX ADMIN — Medication 1 MILLIGRAM(S): at 09:54

## 2021-05-29 RX ADMIN — Medication 1 APPLICATION(S): at 17:47

## 2021-05-29 RX ADMIN — Medication 1000 UNIT(S): at 11:52

## 2021-05-29 RX ADMIN — Medication 1 TABLET(S): at 11:52

## 2021-05-29 RX ADMIN — Medication 1 MILLIGRAM(S): at 16:59

## 2021-05-29 RX ADMIN — GABAPENTIN 100 MILLIGRAM(S): 400 CAPSULE ORAL at 23:19

## 2021-05-29 RX ADMIN — GABAPENTIN 100 MILLIGRAM(S): 400 CAPSULE ORAL at 05:25

## 2021-05-29 RX ADMIN — PANTOPRAZOLE SODIUM 40 MILLIGRAM(S): 20 TABLET, DELAYED RELEASE ORAL at 11:53

## 2021-05-29 RX ADMIN — GABAPENTIN 100 MILLIGRAM(S): 400 CAPSULE ORAL at 14:52

## 2021-05-29 RX ADMIN — Medication 100 MILLIGRAM(S): at 17:47

## 2021-05-29 RX ADMIN — ENOXAPARIN SODIUM 40 MILLIGRAM(S): 100 INJECTION SUBCUTANEOUS at 11:52

## 2021-05-29 RX ADMIN — QUETIAPINE FUMARATE 50 MILLIGRAM(S): 200 TABLET, FILM COATED ORAL at 05:25

## 2021-05-29 RX ADMIN — CHLORHEXIDINE GLUCONATE 1 APPLICATION(S): 213 SOLUTION TOPICAL at 05:27

## 2021-05-29 RX ADMIN — Medication 100 MILLIGRAM(S): at 05:27

## 2021-05-29 RX ADMIN — METHADONE HYDROCHLORIDE 5 MILLIGRAM(S): 40 TABLET ORAL at 17:47

## 2021-05-29 RX ADMIN — NALOXEGOL OXALATE 12.5 MILLIGRAM(S): 12.5 TABLET, FILM COATED ORAL at 11:53

## 2021-05-29 RX ADMIN — QUETIAPINE FUMARATE 50 MILLIGRAM(S): 200 TABLET, FILM COATED ORAL at 17:47

## 2021-05-29 RX ADMIN — ONDANSETRON 4 MILLIGRAM(S): 8 TABLET, FILM COATED ORAL at 23:18

## 2021-05-29 RX ADMIN — Medication 1000 MILLIGRAM(S): at 11:52

## 2021-05-29 RX ADMIN — Medication 1 MILLIGRAM(S): at 23:19

## 2021-05-29 RX ADMIN — Medication 20 MILLIGRAM(S): at 05:26

## 2021-05-29 RX ADMIN — Medication 1 APPLICATION(S): at 05:25

## 2021-05-29 RX ADMIN — METHADONE HYDROCHLORIDE 5 MILLIGRAM(S): 40 TABLET ORAL at 05:25

## 2021-05-29 RX ADMIN — ONDANSETRON 4 MILLIGRAM(S): 8 TABLET, FILM COATED ORAL at 17:46

## 2021-05-29 RX ADMIN — ONDANSETRON 4 MILLIGRAM(S): 8 TABLET, FILM COATED ORAL at 05:26

## 2021-05-29 NOTE — PROGRESS NOTE ADULT - SUBJECTIVE AND OBJECTIVE BOX
Patient is a 55y old  Male who presents with a chief complaint of SOB (29 May 2021 02:30)    pt seen in icu [ x ], reg med floor [   ], bed [ x ], chair at bedside [   ], awake and responsive [ x ], mildly sedated, [  ],    nad [x  ]      Allergies    No Known Allergies        Vitals    T(F): 97.8 (05-29-21 @ 05:00), Max: 99.1 (05-28-21 @ 15:48)  HR: 76 (05-29-21 @ 06:00) (59 - 132)  BP: 115/65 (05-29-21 @ 06:00) (104/69 - 177/91)  RR: 22 (05-29-21 @ 06:00) (20 - 43)  SpO2: 100% (05-29-21 @ 06:00) (98% - 100%)  Wt(kg): --  CAPILLARY BLOOD GLUCOSE      POCT Blood Glucose.: 99 mg/dL (27 May 2021 12:06)      Labs                          8.4    8.04  )-----------( 240      ( 29 May 2021 05:49 )             25.7       05-29    141  |  103  |  16  ----------------------------<  82  3.4<L>   |  30  |  0.76    Ca    8.6      29 May 2021 05:47  Phos  2.5     05-29  Mg     1.8     05-29    TPro  5.9<L>  /  Alb  2.6<L>  /  TBili  0.7  /  DBili  x   /  AST  23  /  ALT  37  /  AlkPhos  72  05-29            .Sputum Sputum  04-16 @ 04:42   Moderate Enterobacter aerogenes (Carbapenem Resistant)  Normal Respiratory Monserrat present  --  Enterobacter aerogenes (Carbapenem Resistant)      .Urine Clean Catch (Midstream)  03-15 @ 00:52   No growth  --  --          Radiology Results      Meds    MEDICATIONS  (STANDING):  ascorbic acid 1000 milliGRAM(s) Oral daily  BACItracin   Ointment 1 Application(s) Topical two times a day  calcium carbonate 1250 mG  + Vitamin D (OsCal 500 + D) 1 Tablet(s) Oral daily  chlorhexidine 2% Cloths 1 Application(s) Topical <User Schedule>  cholecalciferol 1000 Unit(s) Oral daily  clonazePAM  Tablet 1 milliGRAM(s) Oral every 8 hours  enoxaparin Injectable 40 milliGRAM(s) SubCutaneous every 24 hours  gabapentin 100 milliGRAM(s) Oral every 8 hours  iohexol 300 mG (iodine)/mL Oral Solution 30 milliLiter(s) Oral once  labetalol 100 milliGRAM(s) Enteral Tube two times a day  lactated ringers. 1000 milliLiter(s) (75 mL/Hr) IV Continuous <Continuous>  methadone    Tablet 5 milliGRAM(s) Oral every 12 hours  naloxegol 12.5 milliGRAM(s) Oral daily  ondansetron Injectable 4 milliGRAM(s) IV Push every 6 hours  pantoprazole  Injectable 40 milliGRAM(s) IV Push daily  predniSONE   Tablet 20 milliGRAM(s) Oral daily  QUEtiapine 50 milliGRAM(s) Oral two times a day  trimethoprim  40 mG/sulfamethoxazole 200 mG Suspension 160 milliGRAM(s) Oral <User Schedule>      MEDICATIONS  (PRN):  acetaminophen    Suspension .. 650 milliGRAM(s) Oral every 12 hours PRN Temp greater or equal to 38C (100.4F)  ALBUTerol    90 MICROgram(s) HFA Inhaler 2 Puff(s) Inhalation every 6 hours PRN Shortness of Breath and/or Wheezing  artificial  tears Solution 1 Drop(s) Both EYES every 4 hours PRN Dry Eyes  bisacodyl Suppository 10 milliGRAM(s) Rectal daily PRN Constipation  ondansetron Injectable 4 milliGRAM(s) IV Push every 6 hours PRN Nausea and/or Vomiting  sodium chloride 0.9% lock flush 10 milliLiter(s) IV Push every 1 hour PRN Pre/post blood products, medications, blood draw, and to maintain line patency      Physical Exam    Neuro :  no focal deficits  Respiratory: CTA B/L  CV: RRR, S1S2, no murmurs,   Abdominal: Soft, NT, ND +BS, gastrostomy tube inplace  Extremities: edema of extrem, + peripheral pulses      ASSESSMENT      Hypoxemia 2nd to covid pna   transaminitis  prediabetes  h/o appendectomy  cholecystectomy        PLAN    cont cont precautions  covid 5/14/21 neg noted above  d/c remdesevir given covid ab positive noted   completed dexamethasone   started pulse steroids for 3 days - 250mg solumedrol bid now tapered off 4/14/21   C/w prednisone 20 daily    cont on bactrim empirically, TIW   cont asa, vit c,    cont albuterol inhaler   pulm f/u  procalcitonin, D-dimer, crp, ldh, ferritin, lactate noted ,    cont tylenol prn,   cont robitussin prn   pt off precedex    pt on methadone   pain mgmt eval noted   off phenylephrine drip for hypotension  clonidine held 2nd to low bp  s/p intubation 3/29/21   s/p tracheostomy 4/21/21  O2 sat  100% (88% - 100%) mv 40%  O2 via mech vent and taper fio2 as tolerated   vent mgmt as per icu  xray 3/19/21 with pneumomediastinum  rept cxr with New trace right apical pneumothorax. New mild left apical pneumothorax. Grossly stable small pneumomediastinum.  Soft tissue emphysema at the neck bases bilaterally. Grossly stable bilateral pulmonary infiltrates noted.   cxr 2/24 with No evidence of pneumothorax can be appreciated on the available image. This may be related to patient positioning. Evidence of pneumomediastinum and subcutaneous emphysema in the lower neck is again   noted. There are patchy bibasilar infiltrates and elevated right hemidiaphragm noted.   cxr 3/29/21 with No significant change bilateral infiltrates. There is a small simple left apical pneumothorax. No significant pleural effusion. Bilateral subcutaneous emphysema similar to prior.   XRs on 7AM and 5PM with no obvious increase of ptx  cxr 4/4/21 with Improving bilateral airspace disease noted    cxr 4/8/21 with Small left pneumothorax noted.  thoracic surg f/u   s/p L chest tube replacement 4/18/21 for recurrence of left pneumothorax   cxr 4/20/21 with Unchanged advanced infiltrates and catheter left chest tube noted   CXR 4/11/21 with : No interval change compared to one day prior. No pneumothorax noted.   unable to flush or aspirate tube fully, noted debris in tubing which was milked out.   Once material removed from tubing able to aspirated and flush fully. Also changed dressing on pigtail which appears to be in good position.   Monitor O2 status   s/p tracheostomy 4/21/21   cxr 4/21/21 with No evidence of active chest disease. Tracheostomy tube in place otherwise no significant change noted   cxr 4/25/21 with A 40% LEFT pneumothorax. LEFT multi-sidehole pigtail catheter overlies LEFT lower hemithorax.. Bilateral multifocal and diffuse ill-defined airspace opacities..  Follow-up AP portable chest radiograph 4/25/2021 AT 8:58 AM: Residual LEFT 30% upper zone pneumothorax. Otherwise no interval change.noted    cxr 4/30/21 Tracheostomy tube again noted. Left chest tube noted with small left apical pneumothorax unchanged. Bilateral airspace opacities overall worsening. Heart size cannot be accurately assessed in this projection noted.   cxr 5/3/21 with Large left pneumothorax noted above.   cxr 5 4/21 with No significant interval change noted above.   ct chest with Extensive chronic appearing consolidative opacities throughout the lungs, most prominent in the left lower lobe and lingula, with bronchiectasis, scarring, and volume loss. Additional scattered peripheral coarse opacities.   Mild left pneumothorax. Left lateral pleural space pigtail catheter, not in continuity with the pneumothorax. Mild bilateral hydronephrosis, similar to appearance on CT of the abdomen and pelvis on April 27. noted above   s/p 2nd chest tube 5/5/21  cxr 5/6/21 with Tracheostomy and catheter left chest tubes remain. , There are significant diffuse advanced infiltrates again noted. No pneumothorax. Above findings are similar to study earlier in the day. Present film shows a left jugular line inserted   with tip entering the superior vena cava noted   cxr 5/11/21 with Heart magnified by technique. Tracheostomy, left jugular line, and 2 catheter left chest tubes remain. Quite advanced infiltration in the lungs particularly in the left lower lobe again noted.  There is a persistent rather small left apical pneumothorax. Chest is similar to May 10 noted    cxt 5/14/21 with Perihilar diffuse airspace disease and LEFT lower lobe retrocardiac airspace consolidation. LEFT chest tube overlies upper zone. Small LEFT apical less than 5% pneumothorax noted   cxr 5/17/21 with Similar infiltrates. Small left pneumothorax similar to the prior study noted.   cxr 5/19/21 with Persistent mild to moderate left pneumothorax despite chest tube noted   cxr 5/20 with Improved left pneumothorax. Significant infiltration particularly in the left lower lobe again noted   cxr 5/21/21 with No appreciable pneumothorax at this time. Persistent advanced infiltrates noted    cxr 5/22/21 with  Left chest pigtail. Status post tracheostomy. Heart size unremarkable. No pneumothorax. Multiple patchy opacities throughout the left lung. Status post gastrostomy. No dilated loops of bowel noted above.   cxr 5/23/21 with Tracheostomy tube and within the left pigtail catheter are unchanged. Stable hazy opacities, left greater than the right. No pneumothorax noted above.   cxr 5/26/21 with Left lower lobe infiltrate lateral left lung infiltrate again seen. Rather small left apical pneumothorax again noted and small right upper lobe infiltrate again seen. Chest is similar to earlier in the day.noted above.  s/p surgical placement of gastrostomy tube 4/23/2021.   abd xray 5/10/21 with ileus noted   dressing changed daily for seroma   abd xray 5/21/21 with Decreased bowel ileus compared to prior 5/20/2021 exam noted above.   abd xray 5/22/21 with No dilated loops of bowel noted above.   cont tube feeding   CT scan of the chest 5/13/21 demonstrates diffuse bilateral infiltrates with a region of consolidation at the left lung base. Small left pneumothorax. 2 left chest tubes noted in place noted above.  CT scan of the abdomen and pelvis 5/13/21 demonstrates diffuse dilatation of small and large bowel loops most consistent with ileus noted   xray abd with  5/14/21 with Ingested contrast within nonobstructed large bowel to the level of rectum. No acute radiographic intra-abdominal findings noted   ct abd-pelv 5/27/21 with Delayed nephrographic phase enhancement of the nonobstructed bilateral kidneys, suggestive of acute renal insufficiency. No evidence of bowel obstruction. Proctitis noted above.   id f/u   patient completed course of Meropenem  leukocytosis resolved  sputum cx with Enterobacter aerogenes (Carbapenem Resistant) noted above   tmx 99.1     andrea changed  Completed  meropenem, s/p 1 dose of Vancomycin   completed zosyn  lispro ss   prognosis poor   s/p 4 units prbc for anemia  f/u h/h    transfuse prbc as needed  heme onc f/u  retic is elevated.    Haptoglobin normal  ? daily phlebotomy  no hemolysis, Fe/B12/folate adequate  hemolysis is unlikely even his baseline haptoglobin may be very elevated due to COVID  direct pamella neg noted    psych f/u  C/w Klonopin to 1 mg q8h standing (no PRNs), with plan to further taper as tolerated  cont Seroquel to 50 mg BID standing starting tonight  Continue delirium precautions: Frequent reorientation, familiar pictures and objects at bedside, natural light in daytime,   consistent sleep/wake schedule, adequate hydration and nutrition, sensory aids (hearing aids, glasses) present if needed, minimizing noise and overstimulation,   clustering care to minimize overnight interruptions, judicious use of deliriogenic medications (anticholinergics, benzodiazepines and opioid analgesics), minimize use of restraints  cont current meds   mgmt as per icu           Patient is a 55y old  Male who presents with a chief complaint of SOB (29 May 2021 02:30)    pt seen in icu [ x ], reg med floor [   ], bed [ x ], chair at bedside [   ], awake and responsive [ x ], mildly sedated, [  ],    nad [x  ]      Allergies    No Known Allergies        Vitals    T(F): 97.8 (05-29-21 @ 05:00), Max: 99.1 (05-28-21 @ 15:48)  HR: 76 (05-29-21 @ 06:00) (59 - 132)  BP: 115/65 (05-29-21 @ 06:00) (104/69 - 177/91)  RR: 22 (05-29-21 @ 06:00) (20 - 43)  SpO2: 100% (05-29-21 @ 06:00) (98% - 100%)  Wt(kg): --  CAPILLARY BLOOD GLUCOSE      POCT Blood Glucose.: 99 mg/dL (27 May 2021 12:06)      Labs                          8.4    8.04  )-----------( 240      ( 29 May 2021 05:49 )             25.7       05-29    141  |  103  |  16  ----------------------------<  82  3.4<L>   |  30  |  0.76    Ca    8.6      29 May 2021 05:47  Phos  2.5     05-29  Mg     1.8     05-29    TPro  5.9<L>  /  Alb  2.6<L>  /  TBili  0.7  /  DBili  x   /  AST  23  /  ALT  37  /  AlkPhos  72  05-29            .Sputum Sputum  04-16 @ 04:42   Moderate Enterobacter aerogenes (Carbapenem Resistant)  Normal Respiratory Monserrat present  --  Enterobacter aerogenes (Carbapenem Resistant)      .Urine Clean Catch (Midstream)  03-15 @ 00:52   No growth  --  --          Radiology Results      Meds    MEDICATIONS  (STANDING):  ascorbic acid 1000 milliGRAM(s) Oral daily  BACItracin   Ointment 1 Application(s) Topical two times a day  calcium carbonate 1250 mG  + Vitamin D (OsCal 500 + D) 1 Tablet(s) Oral daily  chlorhexidine 2% Cloths 1 Application(s) Topical <User Schedule>  cholecalciferol 1000 Unit(s) Oral daily  clonazePAM  Tablet 1 milliGRAM(s) Oral every 8 hours  enoxaparin Injectable 40 milliGRAM(s) SubCutaneous every 24 hours  gabapentin 100 milliGRAM(s) Oral every 8 hours  iohexol 300 mG (iodine)/mL Oral Solution 30 milliLiter(s) Oral once  labetalol 100 milliGRAM(s) Enteral Tube two times a day  lactated ringers. 1000 milliLiter(s) (75 mL/Hr) IV Continuous <Continuous>  methadone    Tablet 5 milliGRAM(s) Oral every 12 hours  naloxegol 12.5 milliGRAM(s) Oral daily  ondansetron Injectable 4 milliGRAM(s) IV Push every 6 hours  pantoprazole  Injectable 40 milliGRAM(s) IV Push daily  predniSONE   Tablet 20 milliGRAM(s) Oral daily  QUEtiapine 50 milliGRAM(s) Oral two times a day  trimethoprim  40 mG/sulfamethoxazole 200 mG Suspension 160 milliGRAM(s) Oral <User Schedule>      MEDICATIONS  (PRN):  acetaminophen    Suspension .. 650 milliGRAM(s) Oral every 12 hours PRN Temp greater or equal to 38C (100.4F)  ALBUTerol    90 MICROgram(s) HFA Inhaler 2 Puff(s) Inhalation every 6 hours PRN Shortness of Breath and/or Wheezing  artificial  tears Solution 1 Drop(s) Both EYES every 4 hours PRN Dry Eyes  bisacodyl Suppository 10 milliGRAM(s) Rectal daily PRN Constipation  ondansetron Injectable 4 milliGRAM(s) IV Push every 6 hours PRN Nausea and/or Vomiting  sodium chloride 0.9% lock flush 10 milliLiter(s) IV Push every 1 hour PRN Pre/post blood products, medications, blood draw, and to maintain line patency      Physical Exam    Neuro :  no focal deficits  Respiratory: CTA B/L  CV: RRR, S1S2, no murmurs,   Abdominal: Soft, NT, ND +BS, gastrostomy tube inplace  Extremities: edema of extrem, + peripheral pulses      ASSESSMENT      Hypoxemia 2nd to covid pna   transaminitis  prediabetes  h/o appendectomy  cholecystectomy        PLAN    cont cont precautions  covid 5/14/21 neg noted above  d/c remdesevir given covid ab positive noted   completed dexamethasone   started pulse steroids for 3 days - 250mg solumedrol bid now tapered off 4/14/21   C/w prednisone 20 daily    cont on bactrim empirically, TIW   cont asa, vit c,    cont albuterol inhaler   pulm f/u  procalcitonin, D-dimer, crp, ldh, ferritin, lactate noted ,    cont tylenol prn,   cont robitussin prn   pt off precedex    pt on methadone   pain mgmt eval noted   off phenylephrine drip for hypotension  clonidine held 2nd to low bp  s/p intubation 3/29/21   s/p tracheostomy 4/21/21  O2 sat  100% (98% - 100%) mv 40%  O2 via mech vent and taper fio2 as tolerated   vent mgmt as per icu  xray 3/19/21 with pneumomediastinum  rept cxr with New trace right apical pneumothorax. New mild left apical pneumothorax. Grossly stable small pneumomediastinum.  Soft tissue emphysema at the neck bases bilaterally. Grossly stable bilateral pulmonary infiltrates noted.   cxr 2/24 with No evidence of pneumothorax can be appreciated on the available image. This may be related to patient positioning. Evidence of pneumomediastinum and subcutaneous emphysema in the lower neck is again   noted. There are patchy bibasilar infiltrates and elevated right hemidiaphragm noted.   cxr 3/29/21 with No significant change bilateral infiltrates. There is a small simple left apical pneumothorax. No significant pleural effusion. Bilateral subcutaneous emphysema similar to prior.   XRs on 7AM and 5PM with no obvious increase of ptx  cxr 4/4/21 with Improving bilateral airspace disease noted    cxr 4/8/21 with Small left pneumothorax noted.  thoracic surg f/u   s/p L chest tube replacement 4/18/21 for recurrence of left pneumothorax   cxr 4/20/21 with Unchanged advanced infiltrates and catheter left chest tube noted   CXR 4/11/21 with : No interval change compared to one day prior. No pneumothorax noted.   unable to flush or aspirate tube fully, noted debris in tubing which was milked out.   Once material removed from tubing able to aspirated and flush fully. Also changed dressing on pigtail which appears to be in good position.   Monitor O2 status   s/p tracheostomy 4/21/21   cxr 4/21/21 with No evidence of active chest disease. Tracheostomy tube in place otherwise no significant change noted   cxr 4/25/21 with A 40% LEFT pneumothorax. LEFT multi-sidehole pigtail catheter overlies LEFT lower hemithorax.. Bilateral multifocal and diffuse ill-defined airspace opacities..  Follow-up AP portable chest radiograph 4/25/2021 AT 8:58 AM: Residual LEFT 30% upper zone pneumothorax. Otherwise no interval change.noted    cxr 4/30/21 Tracheostomy tube again noted. Left chest tube noted with small left apical pneumothorax unchanged. Bilateral airspace opacities overall worsening. Heart size cannot be accurately assessed in this projection noted.   cxr 5/3/21 with Large left pneumothorax noted above.   cxr 5 4/21 with No significant interval change noted above.   ct chest with Extensive chronic appearing consolidative opacities throughout the lungs, most prominent in the left lower lobe and lingula, with bronchiectasis, scarring, and volume loss. Additional scattered peripheral coarse opacities.   Mild left pneumothorax. Left lateral pleural space pigtail catheter, not in continuity with the pneumothorax. Mild bilateral hydronephrosis, similar to appearance on CT of the abdomen and pelvis on April 27. noted above   s/p 2nd chest tube 5/5/21  cxr 5/6/21 with Tracheostomy and catheter left chest tubes remain. , There are significant diffuse advanced infiltrates again noted. No pneumothorax. Above findings are similar to study earlier in the day. Present film shows a left jugular line inserted   with tip entering the superior vena cava noted   cxr 5/11/21 with Heart magnified by technique. Tracheostomy, left jugular line, and 2 catheter left chest tubes remain. Quite advanced infiltration in the lungs particularly in the left lower lobe again noted.  There is a persistent rather small left apical pneumothorax. Chest is similar to May 10 noted    cxt 5/14/21 with Perihilar diffuse airspace disease and LEFT lower lobe retrocardiac airspace consolidation. LEFT chest tube overlies upper zone. Small LEFT apical less than 5% pneumothorax noted   cxr 5/17/21 with Similar infiltrates. Small left pneumothorax similar to the prior study noted.   cxr 5/19/21 with Persistent mild to moderate left pneumothorax despite chest tube noted   cxr 5/20 with Improved left pneumothorax. Significant infiltration particularly in the left lower lobe again noted   cxr 5/21/21 with No appreciable pneumothorax at this time. Persistent advanced infiltrates noted    cxr 5/22/21 with  Left chest pigtail. Status post tracheostomy. Heart size unremarkable. No pneumothorax. Multiple patchy opacities throughout the left lung. Status post gastrostomy. No dilated loops of bowel noted above.   cxr 5/23/21 with Tracheostomy tube and within the left pigtail catheter are unchanged. Stable hazy opacities, left greater than the right. No pneumothorax noted above.   cxr 5/26/21 with Left lower lobe infiltrate lateral left lung infiltrate again seen. Rather small left apical pneumothorax again noted and small right upper lobe infiltrate again seen. Chest is similar to earlier in the day.noted above.  s/p surgical placement of gastrostomy tube 4/23/2021.   abd xray 5/10/21 with ileus noted   dressing changed daily for seroma   abd xray 5/21/21 with Decreased bowel ileus compared to prior 5/20/2021 exam noted above.   abd xray 5/22/21 with No dilated loops of bowel noted above.   cont tube feeding   CT scan of the chest 5/13/21 demonstrates diffuse bilateral infiltrates with a region of consolidation at the left lung base. Small left pneumothorax. 2 left chest tubes noted in place noted above.  CT scan of the abdomen and pelvis 5/13/21 demonstrates diffuse dilatation of small and large bowel loops most consistent with ileus noted   xray abd with  5/14/21 with Ingested contrast within nonobstructed large bowel to the level of rectum. No acute radiographic intra-abdominal findings noted   ct abd-pelv 5/27/21 with Delayed nephrographic phase enhancement of the nonobstructed bilateral kidneys, suggestive of acute renal insufficiency. No evidence of bowel obstruction. Proctitis noted above.   id f/u   patient completed course of Meropenem  leukocytosis resolved  sputum cx with Enterobacter aerogenes (Carbapenem Resistant) noted above   tmx 99.1     andrea changed  Completed  meropenem, s/p 1 dose of Vancomycin   completed zosyn  lispro ss   prognosis poor   s/p 4 units prbc for anemia  f/u h/h    transfuse prbc as needed  heme onc f/u  retic is elevated.    Haptoglobin normal  ? daily phlebotomy  no hemolysis, Fe/B12/folate adequate  hemolysis is unlikely even his baseline haptoglobin may be very elevated due to COVID  direct pamella neg noted    psych f/u  C/w Klonopin to 1 mg q8h standing (no PRNs), with plan to further taper as tolerated  cont Seroquel to 50 mg BID standing starting tonight  Continue delirium precautions: Frequent reorientation, familiar pictures and objects at bedside, natural light in daytime,   consistent sleep/wake schedule, adequate hydration and nutrition, sensory aids (hearing aids, glasses) present if needed, minimizing noise and overstimulation,   clustering care to minimize overnight interruptions, judicious use of deliriogenic medications (anticholinergics, benzodiazepines and opioid analgesics), minimize use of restraints  cont current meds   mgmt as per icu

## 2021-05-29 NOTE — PROGRESS NOTE ADULT - SUBJECTIVE AND OBJECTIVE BOX
Patient is a 55y old  Male who presents with a chief complaint of SOB (29 May 2021 06:35)    Awake, alert, laying in bed in NAD. Not tachycardic or tachypneic at this moment.   Oxygen sat 98%.     INTERVAL HPI/OVERNIGHT EVENTS:      VITAL SIGNS:  T(F): 97.8 (05-29-21 @ 05:00)  HR: 67 (05-29-21 @ 08:38)  BP: 115/65 (05-29-21 @ 06:00)  RR: 22 (05-29-21 @ 06:00)  SpO2: 98% (05-29-21 @ 08:38)  Wt(kg): --  I&O's Detail    28 May 2021 07:01  -  29 May 2021 07:00  --------------------------------------------------------  IN:    Enteral Tube Flush: 410 mL    Lactated Ringers: 1800 mL    Vital1.5: 215 mL  Total IN: 2425 mL    OUT:    Chest Tube (mL): 50 mL    Emesis (mL): 0 mL    Incontinent per Condom Catheter (mL): 300 mL    Jevity 1.5: 0 mL    Voided (mL): 480 mL  Total OUT: 830 mL    Total NET: 1595 mL        Mode: AC/ CMV (Assist Control/ Continuous Mandatory Ventilation)  RR (machine): 22  TV (machine): 450  FiO2: 40  PEEP: 5  ITime: 1  MAP: 14  PIP: 41        REVIEW OF SYSTEMS:    CONSTITUTIONAL:  No fevers, chills, sweats    HEENT:  Eyes:  No diplopia or blurred vision. ENT:  No earache, sore throat or runny nose.    CARDIOVASCULAR:  No pressure, squeezing, tightness, or heaviness about the chest; no palpitations.    RESPIRATORY:  Per HPI    GASTROINTESTINAL:  No abdominal pain, nausea, vomiting or diarrhea.    GENITOURINARY:  No dysuria, frequency or urgency.    NEUROLOGIC:  No paresthesias, fasciculations, seizures or weakness.    PSYCHIATRIC:  No disorder of thought or mood.      PHYSICAL EXAM:    Constitutional: Well developed and nourished  Eyes:Perrla  ENMT: normal  Neck:supple  Respiratory: good air entry  Cardiovascular: S1 S2 regular  Gastrointestinal: Soft, Non tender  Extremities: No edema  Vascular:normal  Neurological:Awake, alert,Ox3  Musculoskeletal:Normal      MEDICATIONS  (STANDING):  ascorbic acid 1000 milliGRAM(s) Oral daily  BACItracin   Ointment 1 Application(s) Topical two times a day  calcium carbonate 1250 mG  + Vitamin D (OsCal 500 + D) 1 Tablet(s) Oral daily  chlorhexidine 2% Cloths 1 Application(s) Topical <User Schedule>  cholecalciferol 1000 Unit(s) Oral daily  clonazePAM  Tablet 1 milliGRAM(s) Oral every 8 hours  enoxaparin Injectable 40 milliGRAM(s) SubCutaneous every 24 hours  gabapentin 100 milliGRAM(s) Oral every 8 hours  labetalol 100 milliGRAM(s) Enteral Tube two times a day  lactated ringers. 1000 milliLiter(s) (75 mL/Hr) IV Continuous <Continuous>  methadone    Tablet 5 milliGRAM(s) Oral every 12 hours  naloxegol 12.5 milliGRAM(s) Oral daily  ondansetron Injectable 4 milliGRAM(s) IV Push every 6 hours  pantoprazole  Injectable 40 milliGRAM(s) IV Push daily  predniSONE   Tablet 20 milliGRAM(s) Oral daily  QUEtiapine 50 milliGRAM(s) Oral two times a day  trimethoprim  40 mG/sulfamethoxazole 200 mG Suspension 160 milliGRAM(s) Oral <User Schedule>    MEDICATIONS  (PRN):  acetaminophen    Suspension .. 650 milliGRAM(s) Oral every 12 hours PRN Temp greater or equal to 38C (100.4F)  ALBUTerol    90 MICROgram(s) HFA Inhaler 2 Puff(s) Inhalation every 6 hours PRN Shortness of Breath and/or Wheezing  artificial  tears Solution 1 Drop(s) Both EYES every 4 hours PRN Dry Eyes  bisacodyl Suppository 10 milliGRAM(s) Rectal daily PRN Constipation  ondansetron Injectable 4 milliGRAM(s) IV Push every 6 hours PRN Nausea and/or Vomiting  sodium chloride 0.9% lock flush 10 milliLiter(s) IV Push every 1 hour PRN Pre/post blood products, medications, blood draw, and to maintain line patency      Allergies    No Known Allergies    Intolerances        LABS:                        8.4    8.04  )-----------( 240      ( 29 May 2021 05:49 )             25.7     05-29    141  |  103  |  16  ----------------------------<  82  3.4<L>   |  30  |  0.76    Ca    8.6      29 May 2021 05:47  Phos  2.5     05-29  Mg     1.8     05-29    TPro  5.9<L>  /  Alb  2.6<L>  /  TBili  0.7  /  DBili  x   /  AST  23  /  ALT  37  /  AlkPhos  72  05-29              CAPILLARY BLOOD GLUCOSE            RADIOLOGY & ADDITIONAL TESTS:    CXR:  < from: Xray Chest 1 View- PORTABLE-Routine (Xray Chest 1 View- PORTABLE-Routine in AM.) (05.28.21 @ 07:55) >  IMPRESSION:  Tracheostomy cannula left pigtail chest catheter reidentified in position. No change in small simple left apical pneumothorax. No gross interval change in bilateral airspace and interstitial opacities. No significant pleural effusion or other new abnormality.        < end of copied text >    < from: Xray Abdomen 1 View PORTABLE -Urgent (Xray Abdomen 1 View PORTABLE -Urgent .) (05.28.21 @ 10:30) >    IMPRESSION:  Mild generalized ileus not significantly changed. No mechanical bowel obstruction or other new abnormality. Continued follow-up is in order.    < end of copied text >    Ct scan chest:  < from: CT Abdomen and Pelvis w/ IV Cont (05.27.21 @ 16:22) >  FINDINGS:  LOWER CHEST: The left basilar pigtail drainage catheter has been removed. There is improved left lower lobe consolidation with residual volume loss and bronchiectasis. There is trace residual pneumothorax at the left lung base anteriorly and mild pneumomediastinum anteriorly.    < end of copied text >    ekg;    echo:

## 2021-05-29 NOTE — PROGRESS NOTE ADULT - ASSESSMENT
Assessment and plan: 54 y/o M with no PMH is admitted to ICU for AHRF 2/2 covid19 pna     1. Acute hypoxic respiratory failure  2. ARDS 2/2 Covid pneumonia  3. Pneumothorax  4. Prediabetes  5. Abnormal TSH     Neuro  -Alert and oriented x 3 at baseline   -Off all drips, and calm  -C/w Klonopin to 1mg q8h  -C/w Seroquel 75 mg BID   -Decreased methadone to 10/5/10  - d/c Ativan PRN, fentanyl PRN  -CT head unremarkable   -Low concern for malignant hypothermia, NMS, or serotonin syndrome given waxing/waning and lack of inciting medications    Cardiovascular  # Shock   now off pressors   Clonidine was d/c    Hypertension:  BP was found to be consistently elevated  Was started on labetalol 100mg bid     Pulm  #Acute hypoxic respiratory failure: secondary to Covid19 pneumonia  #ARDS  -Was on HFNC then got intubated 3/29  -Trach 4/22 continues on Vent   - Remdesivir was discontinued due to positive antibodies   - Finished Dexamethasone   - Covid19 PCR negative now, off isolation   - Daily SBT  Tolerated well today, will put on trach collar  Full vent support overnight    # Bacterial Pneumonia  - Stable infiltrate on CXR ,likely developed Bacterial pneumonia   - Completed ABx Zosyn, meropenem, s/p 1 dose of Vancomycin  - MRSA negative from 3/26, 5/18  - Sputum Cx growing few gram negative rods, CRE - Contact isolation  - Taper prednisone to 30 daily (5/11) for possible organizing pneumonia   - C/w bactrim empirically, TIW for PCP PPX  - Secretions from the trach, needs frequent suctions   - Pulm. Dr. Ryan  - ID Dr Anand   - Was found to have fevers 5/17  Was started on vanc and zosyn  MRSA is negative, will d/c vanc  zosyn now dc     #Pneumothorax and pneumomediastinum:   -Thoracic surgery following  -s/p Chest tube placed 4/8 pigtail to wall suction and d/c on 4/15  -On 4/18 chest tube replaced for recurrence of PTX- c/w chest tube to suction     -anterior chest tube placed 5/6 for worsening pneumothorax with resolution of PTX  5/10 CXR with recurrence of apical pneumo - tube adjusted with improvement however still persisting but stable  -Repeat Chest CT shows persistent PTX  -Water seal inferior CT -PTX was increased, readjusted tube as it was kinked.  - Lateral Ct was removed, repeat CXr showed developing of PTX in left lower lobe, resolving slowly  -Surgery was informed, following the case closely  - 5/23:  place CT to waterseal  5/24 reformation of PTX  5/24 CT to suction, CXR improvement  5/26 CT placed on water seal, c/w water seal  5/27 repeat CXR shows stable PTX  5/28 stable PTX      ID  Likely bacterial pneumonia   -leukocytosis improved 9k  -Febrile 100.4 5/18 - last episode  Was started on vanc and zosyn  MRSA is negative, will d/c vanc  dc zosyn 5/22 s/p 5 days of zosyn    Nephro  -Pitting edema to both arms, ecchymosis on right AC, blisters on left arm around brachial area    -Was retaining urine, andrea was placed 5/5  -DC doxazosin for borderline BPs  potassium and phosphorus replaced this morning; monitor electrolytes     JARRED:  Patient has elevated creatinine compared to baseline, creatinine clearance is trending up.  Will start on mild IV hydration  ua shows proteinuria, triple phosphate crystals, large blood >50 rbc  FENA 6.6%, post obstructive  Monitor BMP    GI  Transaminitis:   likely secondary to Covid19, Resolved   hepatitis panel -ve  -continue to monitor LFT    Ileus  Abd xray with ileus, CT showing the same 5/15  Restart trickle feeds  G Tube off suction now  Rectal tube in place  C/w Miralax BID standing and reglan   G tube 4/23, - dressing changed daily for seroma  Patient was found to have ileus again on 5/20  Was started on lactulose suppository  slowly start on tube feeds  Cdiff negative  Start movantik    Vomiting  Patient had multiple episodes of vomiting for past 24 htrs  Was given zofran  Tube feeds were held, restarted 5/28  Xray abdomen showed distension, likely worsening of ileus  Will do CT abdomen with IV contrast.  Surgery was informed      Heme  Elevated d-dimer: likely secondary to Covid19   -D-dimer 423 on admission  -Last D-dimer 1434  - No active bleeding, restarted lovenox daily    Anemia  S/p 4 PRBC total  - Now Hg stable 7-9  CTH and CT abdomen w/o contrast no evidence of bleed    No active signs of bleeding (No hematemesis, melena or hematuria)  Hemolysis w/u- negative  Iron studies show ACD  Dr Andrade on board   F/u CBC daily     Endocrine  Prediabetes:  -A1c 5.8  -BS controlled  -continue HSS    Abnormal TSH:  -TSH level noted 0.26,   -Repeat TSH 0.37 and Free T4 1.82    Skin/Catheters  No rashes. Peripheral IV lines.   Both arms with edema, right AC with ecchymosis  doppler of LUE was negative    Prophylaxis   On Lovenox for DVT   Protonix for GI proph    GOC  FULL CODE

## 2021-05-29 NOTE — PROGRESS NOTE ADULT - PROBLEM SELECTOR PLAN 2
Ct chest  noted  Thoracic sx follow up  S/P  anterior pig tail tube by IR on left  Daily  CXR   Management per ICU. Ct chest  noted  Thoracic sx follow up  S/P pig tail cath removal  Daily  CXR   Management per ICU.

## 2021-05-29 NOTE — PROGRESS NOTE ADULT - SUBJECTIVE AND OBJECTIVE BOX
INTERVAL HPI/OVERNIGHT EVENTS:  No acute events overnight. Pt comfortable    PRESSORS: [ ] YES [ ] NO  WHICH:    ANTIBIOTICS:                      Antimicrobial:  trimethoprim  40 mG/sulfamethoxazole 200 mG Suspension 160 milliGRAM(s) Oral <User Schedule>    Cardiovascular:  labetalol 100 milliGRAM(s) Enteral Tube two times a day    Pulmonary:  ALBUTerol    90 MICROgram(s) HFA Inhaler 2 Puff(s) Inhalation every 6 hours PRN    Hematalogic:  enoxaparin Injectable 40 milliGRAM(s) SubCutaneous every 24 hours    Other:  acetaminophen    Suspension .. 650 milliGRAM(s) Oral every 12 hours PRN  artificial  tears Solution 1 Drop(s) Both EYES every 4 hours PRN  ascorbic acid 1000 milliGRAM(s) Oral daily  BACItracin   Ointment 1 Application(s) Topical two times a day  bisacodyl Suppository 10 milliGRAM(s) Rectal daily PRN  calcium carbonate 1250 mG  + Vitamin D (OsCal 500 + D) 1 Tablet(s) Oral daily  chlorhexidine 2% Cloths 1 Application(s) Topical <User Schedule>  cholecalciferol 1000 Unit(s) Oral daily  clonazePAM  Tablet 1 milliGRAM(s) Oral every 8 hours  gabapentin 100 milliGRAM(s) Oral every 8 hours  iohexol 300 mG (iodine)/mL Oral Solution 30 milliLiter(s) Oral once  lactated ringers. 1000 milliLiter(s) IV Continuous <Continuous>  methadone    Tablet 5 milliGRAM(s) Oral every 12 hours  naloxegol 12.5 milliGRAM(s) Oral daily  ondansetron Injectable 4 milliGRAM(s) IV Push every 6 hours  ondansetron Injectable 4 milliGRAM(s) IV Push every 6 hours PRN  pantoprazole  Injectable 40 milliGRAM(s) IV Push daily  predniSONE   Tablet 20 milliGRAM(s) Oral daily  QUEtiapine 50 milliGRAM(s) Oral two times a day  sodium chloride 0.9% lock flush 10 milliLiter(s) IV Push every 1 hour PRN    acetaminophen    Suspension .. 650 milliGRAM(s) Oral every 12 hours PRN  ALBUTerol    90 MICROgram(s) HFA Inhaler 2 Puff(s) Inhalation every 6 hours PRN  artificial  tears Solution 1 Drop(s) Both EYES every 4 hours PRN  ascorbic acid 1000 milliGRAM(s) Oral daily  BACItracin   Ointment 1 Application(s) Topical two times a day  bisacodyl Suppository 10 milliGRAM(s) Rectal daily PRN  calcium carbonate 1250 mG  + Vitamin D (OsCal 500 + D) 1 Tablet(s) Oral daily  chlorhexidine 2% Cloths 1 Application(s) Topical <User Schedule>  cholecalciferol 1000 Unit(s) Oral daily  clonazePAM  Tablet 1 milliGRAM(s) Oral every 8 hours  enoxaparin Injectable 40 milliGRAM(s) SubCutaneous every 24 hours  gabapentin 100 milliGRAM(s) Oral every 8 hours  iohexol 300 mG (iodine)/mL Oral Solution 30 milliLiter(s) Oral once  labetalol 100 milliGRAM(s) Enteral Tube two times a day  lactated ringers. 1000 milliLiter(s) IV Continuous <Continuous>  methadone    Tablet 5 milliGRAM(s) Oral every 12 hours  naloxegol 12.5 milliGRAM(s) Oral daily  ondansetron Injectable 4 milliGRAM(s) IV Push every 6 hours  ondansetron Injectable 4 milliGRAM(s) IV Push every 6 hours PRN  pantoprazole  Injectable 40 milliGRAM(s) IV Push daily  predniSONE   Tablet 20 milliGRAM(s) Oral daily  QUEtiapine 50 milliGRAM(s) Oral two times a day  sodium chloride 0.9% lock flush 10 milliLiter(s) IV Push every 1 hour PRN  trimethoprim  40 mG/sulfamethoxazole 200 mG Suspension 160 milliGRAM(s) Oral <User Schedule>    Drug Dosing Weight  Height (cm): 167.6 (23 Apr 2021 23:15)  Weight (kg): 83.9 (23 Apr 2021 23:15)  BMI (kg/m2): 29.9 (23 Apr 2021 23:15)  BSA (m2): 1.93 (23 Apr 2021 23:15)    CENTRAL LINE: [ ] YES [ ] NO  LOCATION:   DATE INSERTED:  REMOVE: [ ] YES [ ] NO  EXPLAIN:    RIVERA: [ ] YES [ ] NO    DATE INSERTED:  REMOVE:  [ ] YES [ ] NO  EXPLAIN:    A-LINE:  [ ] YES [ ] NO  LOCATION:   DATE INSERTED:  REMOVE:  [ ] YES [ ] NO  EXPLAIN:    PMH -reviewed admission note, no change since admission    ICU Vital Signs Last 24 Hrs  T(C): 36.8 (28 May 2021 23:30), Max: 37.3 (28 May 2021 06:00)  T(F): 98.3 (28 May 2021 23:30), Max: 99.1 (28 May 2021 06:00)  HR: 87 (29 May 2021 00:09) (70 - 132)  BP: 136/80 (29 May 2021 00:00) (104/69 - 177/91)  BP(mean): 92 (29 May 2021 00:00) (73 - 112)  ABP: --  ABP(mean): --  RR: 21 (29 May 2021 00:00) (14 - 43)  SpO2: 100% (29 May 2021 00:09) (98% - 100%)            05-27 @ 07:01  -  05-28 @ 07:00  --------------------------------------------------------  IN: 1075 mL / OUT: 910 mL / NET: 165 mL        Mode: AC/ CMV (Assist Control/ Continuous Mandatory Ventilation)  RR (machine): 22  TV (machine): 450  FiO2: 40  PEEP: 5  ITime: 0.7  MAP: 10  PIP: 30      PHYSICAL EXAM:    GENERAL: nad  HEAD:  Atraumatic, Normocephalic  EYES: EOMI, PERRLA, conjunctiva and sclera clear  ENMT: No tonsillar erythema, exudates, or enlargement; Moist mucous membranes, Good dentition, No lesions  NECK: Supple, normal appearance, No JVD; Normal thyroid; Trachea midline  NERVOUS SYSTEM:  Alert & Oriented, obeying commands  CHEST/LUNG: No chest deformity; Normal percussion bilaterally; No rales, rhonchi, wheezing, CT to water seal  HEART: Regular rate and rhythm; No murmurs, rubs, or gallops  ABDOMEN: Soft, Nontender, Nondistended; Bowel sounds present  EXTREMITIES:  2+ Peripheral Pulses, No clubbing, cyanosis, or edema  LYMPH: No lymphadenopathy noted  SKIN: No rashes or lesions; Good capillary refill    LABS:  CBC Full  -  ( 28 May 2021 04:28 )  WBC Count : 8.41 K/uL  RBC Count : 2.67 M/uL  Hemoglobin : 8.9 g/dL  Hematocrit : 27.0 %  Platelet Count - Automated : 274 K/uL  Mean Cell Volume : 101.1 fl  Mean Cell Hemoglobin : 33.3 pg  Mean Cell Hemoglobin Concentration : 33.0 gm/dL  Auto Neutrophil # : x  Auto Lymphocyte # : x  Auto Monocyte # : x  Auto Eosinophil # : x  Auto Basophil # : x  Auto Neutrophil % : x  Auto Lymphocyte % : x  Auto Monocyte % : x  Auto Eosinophil % : x  Auto Basophil % : x    05-28    142  |  101  |  16  ----------------------------<  83  3.7   |  30  |  0.89    Ca    8.9      28 May 2021 04:28  Phos  2.8     05-28  Mg     1.8     05-28    TPro  6.6  /  Alb  2.9<L>  /  TBili  0.9  /  DBili  x   /  AST  28  /  ALT  47  /  AlkPhos  82  05-28            RADIOLOGY & ADDITIONAL STUDIES REVIEWED:  ***    GOALS OF CARE DISCUSSION WITH PATIENT/FAMILY/PROXY:    CRITICAL CARE TIME SPENT: 35 minutes INTERVAL HPI/OVERNIGHT EVENTS:  No acute events overnight. Pt comfortable    PRESSORS: [ ] YES [ ] NO  WHICH:    ANTIBIOTICS:                      Antimicrobial:  trimethoprim  40 mG/sulfamethoxazole 200 mG Suspension 160 milliGRAM(s) Oral <User Schedule>    Cardiovascular:  labetalol 100 milliGRAM(s) Enteral Tube two times a day    Pulmonary:  ALBUTerol    90 MICROgram(s) HFA Inhaler 2 Puff(s) Inhalation every 6 hours PRN    Hematalogic:  enoxaparin Injectable 40 milliGRAM(s) SubCutaneous every 24 hours    Other:  acetaminophen    Suspension .. 650 milliGRAM(s) Oral every 12 hours PRN  artificial  tears Solution 1 Drop(s) Both EYES every 4 hours PRN  ascorbic acid 1000 milliGRAM(s) Oral daily  BACItracin   Ointment 1 Application(s) Topical two times a day  bisacodyl Suppository 10 milliGRAM(s) Rectal daily PRN  calcium carbonate 1250 mG  + Vitamin D (OsCal 500 + D) 1 Tablet(s) Oral daily  chlorhexidine 2% Cloths 1 Application(s) Topical <User Schedule>  cholecalciferol 1000 Unit(s) Oral daily  clonazePAM  Tablet 1 milliGRAM(s) Oral every 8 hours  gabapentin 100 milliGRAM(s) Oral every 8 hours  iohexol 300 mG (iodine)/mL Oral Solution 30 milliLiter(s) Oral once  lactated ringers. 1000 milliLiter(s) IV Continuous <Continuous>  methadone    Tablet 5 milliGRAM(s) Oral every 12 hours  naloxegol 12.5 milliGRAM(s) Oral daily  ondansetron Injectable 4 milliGRAM(s) IV Push every 6 hours  ondansetron Injectable 4 milliGRAM(s) IV Push every 6 hours PRN  pantoprazole  Injectable 40 milliGRAM(s) IV Push daily  predniSONE   Tablet 20 milliGRAM(s) Oral daily  QUEtiapine 50 milliGRAM(s) Oral two times a day  sodium chloride 0.9% lock flush 10 milliLiter(s) IV Push every 1 hour PRN    acetaminophen    Suspension .. 650 milliGRAM(s) Oral every 12 hours PRN  ALBUTerol    90 MICROgram(s) HFA Inhaler 2 Puff(s) Inhalation every 6 hours PRN  artificial  tears Solution 1 Drop(s) Both EYES every 4 hours PRN  ascorbic acid 1000 milliGRAM(s) Oral daily  BACItracin   Ointment 1 Application(s) Topical two times a day  bisacodyl Suppository 10 milliGRAM(s) Rectal daily PRN  calcium carbonate 1250 mG  + Vitamin D (OsCal 500 + D) 1 Tablet(s) Oral daily  chlorhexidine 2% Cloths 1 Application(s) Topical <User Schedule>  cholecalciferol 1000 Unit(s) Oral daily  clonazePAM  Tablet 1 milliGRAM(s) Oral every 8 hours  enoxaparin Injectable 40 milliGRAM(s) SubCutaneous every 24 hours  gabapentin 100 milliGRAM(s) Oral every 8 hours  iohexol 300 mG (iodine)/mL Oral Solution 30 milliLiter(s) Oral once  labetalol 100 milliGRAM(s) Enteral Tube two times a day  lactated ringers. 1000 milliLiter(s) IV Continuous <Continuous>  methadone    Tablet 5 milliGRAM(s) Oral every 12 hours  naloxegol 12.5 milliGRAM(s) Oral daily  ondansetron Injectable 4 milliGRAM(s) IV Push every 6 hours  ondansetron Injectable 4 milliGRAM(s) IV Push every 6 hours PRN  pantoprazole  Injectable 40 milliGRAM(s) IV Push daily  predniSONE   Tablet 20 milliGRAM(s) Oral daily  QUEtiapine 50 milliGRAM(s) Oral two times a day  sodium chloride 0.9% lock flush 10 milliLiter(s) IV Push every 1 hour PRN  trimethoprim  40 mG/sulfamethoxazole 200 mG Suspension 160 milliGRAM(s) Oral <User Schedule>    Drug Dosing Weight  Height (cm): 167.6 (23 Apr 2021 23:15)  Weight (kg): 83.9 (23 Apr 2021 23:15)  BMI (kg/m2): 29.9 (23 Apr 2021 23:15)  BSA (m2): 1.93 (23 Apr 2021 23:15)    CENTRAL LINE: [ ] YES [ ] NO  LOCATION:   DATE INSERTED:  REMOVE: [ ] YES [ ] NO  EXPLAIN:    RIVERA: [ ] YES [ ] NO    DATE INSERTED:  REMOVE:  [ ] YES [ ] NO  EXPLAIN:    A-LINE:  [ ] YES [ ] NO  LOCATION:   DATE INSERTED:  REMOVE:  [ ] YES [ ] NO  EXPLAIN:    PMH -reviewed admission note, no change since admission    ICU Vital Signs Last 24 Hrs  T(C): 36.8 (28 May 2021 23:30), Max: 37.3 (28 May 2021 06:00)  T(F): 98.3 (28 May 2021 23:30), Max: 99.1 (28 May 2021 06:00)  HR: 87 (29 May 2021 00:09) (70 - 132)  BP: 136/80 (29 May 2021 00:00) (104/69 - 177/91)  BP(mean): 92 (29 May 2021 00:00) (73 - 112)  ABP: --  ABP(mean): --  RR: 21 (29 May 2021 00:00) (14 - 43)  SpO2: 100% (29 May 2021 00:09) (98% - 100%)            05-27 @ 07:01  -  05-28 @ 07:00  --------------------------------------------------------  IN: 1075 mL / OUT: 910 mL / NET: 165 mL        Mode: AC/ CMV (Assist Control/ Continuous Mandatory Ventilation)  RR (machine): 22  TV (machine): 450  FiO2: 40  PEEP: 5  ITime: 0.7  MAP: 10  PIP: 30      PHYSICAL EXAM:    GENERAL: nad  HEAD:  Atraumatic, Normocephalic  EYES: EOMI, PERRLA, conjunctiva and sclera clear  ENMT: No tonsillar erythema, exudates, or enlargement; Moist mucous membranes, Good dentition, No lesions + trach   NECK: Supple, normal appearance, No JVD; Normal thyroid; Trachea midline  NERVOUS SYSTEM:  Alert & Oriented, obeying commands  CHEST/LUNG: No chest deformity; Normal percussion bilaterally; No rales, rhonchi, wheezing, CT to water seal  HEART: Regular rate and rhythm; No murmurs, rubs, or gallops  ABDOMEN: Soft, Nontender, Nondistended; Bowel sounds present  EXTREMITIES:  2+ Peripheral Pulses, No clubbing, cyanosis, or edema  LYMPH: No lymphadenopathy noted  SKIN: No rashes or lesions; Good capillary refill    LABS:  CBC Full  -  ( 28 May 2021 04:28 )  WBC Count : 8.41 K/uL  RBC Count : 2.67 M/uL  Hemoglobin : 8.9 g/dL  Hematocrit : 27.0 %  Platelet Count - Automated : 274 K/uL  Mean Cell Volume : 101.1 fl  Mean Cell Hemoglobin : 33.3 pg  Mean Cell Hemoglobin Concentration : 33.0 gm/dL  Auto Neutrophil # : x  Auto Lymphocyte # : x  Auto Monocyte # : x  Auto Eosinophil # : x  Auto Basophil # : x  Auto Neutrophil % : x  Auto Lymphocyte % : x  Auto Monocyte % : x  Auto Eosinophil % : x  Auto Basophil % : x    05-28    142  |  101  |  16  ----------------------------<  83  3.7   |  30  |  0.89    Ca    8.9      28 May 2021 04:28  Phos  2.8     05-28  Mg     1.8     05-28    TPro  6.6  /  Alb  2.9<L>  /  TBili  0.9  /  DBili  x   /  AST  28  /  ALT  47  /  AlkPhos  82  05-28            RADIOLOGY & ADDITIONAL STUDIES REVIEWED:  ***    CXR:< from: Xray Chest 1 View- PORTABLE-Routine (Xray Chest 1 View- PORTABLE-Routine in AM.) (05.28.21 @ 07:55) >    EXAM:  XR CHEST PORTABLE ROUTINE 1V                            PROCEDURE DATE:  05/28/2021          INTERPRETATION:  Follow-up.    AP chest. Prior 5/27/2021.      IMPRESSION:  Tracheostomy cannula left pigtail chest catheter reidentified in position. No change in small simple left apical pneumothorax. No gross interval change in bilateral airspace and interstitial opacities. No significant pleural effusion or other new abnormality.            BRONSON KENDRICK MD; Attending Radiologist  This document has been electronically signed. May 28 2021  8:53AM    < end of copied text >    GOALS OF CARE DISCUSSION WITH PATIENT/FAMILY/PROXY:    CRITICAL CARE TIME SPENT: 35 minutes

## 2021-05-30 LAB
ALBUMIN SERPL ELPH-MCNC: 2.5 G/DL — LOW (ref 3.5–5)
ALP SERPL-CCNC: 64 U/L — SIGNIFICANT CHANGE UP (ref 40–120)
ALT FLD-CCNC: 32 U/L DA — SIGNIFICANT CHANGE UP (ref 10–60)
ANION GAP SERPL CALC-SCNC: 6 MMOL/L — SIGNIFICANT CHANGE UP (ref 5–17)
AST SERPL-CCNC: 27 U/L — SIGNIFICANT CHANGE UP (ref 10–40)
BILIRUB SERPL-MCNC: 0.6 MG/DL — SIGNIFICANT CHANGE UP (ref 0.2–1.2)
BUN SERPL-MCNC: 15 MG/DL — SIGNIFICANT CHANGE UP (ref 7–18)
CALCIUM SERPL-MCNC: 8.9 MG/DL — SIGNIFICANT CHANGE UP (ref 8.4–10.5)
CHLORIDE SERPL-SCNC: 104 MMOL/L — SIGNIFICANT CHANGE UP (ref 96–108)
CO2 SERPL-SCNC: 31 MMOL/L — SIGNIFICANT CHANGE UP (ref 22–31)
CREAT SERPL-MCNC: 0.8 MG/DL — SIGNIFICANT CHANGE UP (ref 0.5–1.3)
GLUCOSE SERPL-MCNC: 79 MG/DL — SIGNIFICANT CHANGE UP (ref 70–99)
HCT VFR BLD CALC: 23.3 % — LOW (ref 39–50)
HGB BLD-MCNC: 8.2 G/DL — LOW (ref 13–17)
MAGNESIUM SERPL-MCNC: 1.7 MG/DL — SIGNIFICANT CHANGE UP (ref 1.6–2.6)
MCHC RBC-ENTMCNC: 35.2 GM/DL — SIGNIFICANT CHANGE UP (ref 32–36)
MCHC RBC-ENTMCNC: 37.6 PG — HIGH (ref 27–34)
MCV RBC AUTO: 106.9 FL — HIGH (ref 80–100)
NRBC # BLD: 0 /100 WBCS — SIGNIFICANT CHANGE UP (ref 0–0)
PHOSPHATE SERPL-MCNC: 1.9 MG/DL — LOW (ref 2.5–4.5)
PLATELET # BLD AUTO: 237 K/UL — SIGNIFICANT CHANGE UP (ref 150–400)
POTASSIUM SERPL-MCNC: 3.7 MMOL/L — SIGNIFICANT CHANGE UP (ref 3.5–5.3)
POTASSIUM SERPL-SCNC: 3.7 MMOL/L — SIGNIFICANT CHANGE UP (ref 3.5–5.3)
PROT SERPL-MCNC: 5.7 G/DL — LOW (ref 6–8.3)
RBC # BLD: 2.18 M/UL — LOW (ref 4.2–5.8)
RBC # FLD: 17.3 % — HIGH (ref 10.3–14.5)
SODIUM SERPL-SCNC: 141 MMOL/L — SIGNIFICANT CHANGE UP (ref 135–145)
WBC # BLD: 7.5 K/UL — SIGNIFICANT CHANGE UP (ref 3.8–10.5)
WBC # FLD AUTO: 7.5 K/UL — SIGNIFICANT CHANGE UP (ref 3.8–10.5)

## 2021-05-30 PROCEDURE — 71045 X-RAY EXAM CHEST 1 VIEW: CPT | Mod: 26

## 2021-05-30 RX ORDER — ACETAMINOPHEN 500 MG
650 TABLET ORAL EVERY 12 HOURS
Refills: 0 | Status: DISCONTINUED | OUTPATIENT
Start: 2021-05-30 | End: 2021-06-11

## 2021-05-30 RX ORDER — POTASSIUM PHOSPHATE, MONOBASIC POTASSIUM PHOSPHATE, DIBASIC 236; 224 MG/ML; MG/ML
30 INJECTION, SOLUTION INTRAVENOUS ONCE
Refills: 0 | Status: COMPLETED | OUTPATIENT
Start: 2021-05-30 | End: 2021-05-30

## 2021-05-30 RX ADMIN — ONDANSETRON 4 MILLIGRAM(S): 8 TABLET, FILM COATED ORAL at 12:35

## 2021-05-30 RX ADMIN — GABAPENTIN 100 MILLIGRAM(S): 400 CAPSULE ORAL at 05:05

## 2021-05-30 RX ADMIN — ONDANSETRON 4 MILLIGRAM(S): 8 TABLET, FILM COATED ORAL at 05:06

## 2021-05-30 RX ADMIN — Medication 650 MILLIGRAM(S): at 23:48

## 2021-05-30 RX ADMIN — Medication 650 MILLIGRAM(S): at 20:36

## 2021-05-30 RX ADMIN — Medication 1 TABLET(S): at 12:34

## 2021-05-30 RX ADMIN — QUETIAPINE FUMARATE 50 MILLIGRAM(S): 200 TABLET, FILM COATED ORAL at 18:37

## 2021-05-30 RX ADMIN — Medication 20 MILLIGRAM(S): at 05:09

## 2021-05-30 RX ADMIN — Medication 1 APPLICATION(S): at 18:36

## 2021-05-30 RX ADMIN — GABAPENTIN 100 MILLIGRAM(S): 400 CAPSULE ORAL at 14:56

## 2021-05-30 RX ADMIN — Medication 1 MILLIGRAM(S): at 16:53

## 2021-05-30 RX ADMIN — QUETIAPINE FUMARATE 50 MILLIGRAM(S): 200 TABLET, FILM COATED ORAL at 05:05

## 2021-05-30 RX ADMIN — NALOXEGOL OXALATE 12.5 MILLIGRAM(S): 12.5 TABLET, FILM COATED ORAL at 12:34

## 2021-05-30 RX ADMIN — METHADONE HYDROCHLORIDE 5 MILLIGRAM(S): 40 TABLET ORAL at 18:36

## 2021-05-30 RX ADMIN — ONDANSETRON 4 MILLIGRAM(S): 8 TABLET, FILM COATED ORAL at 18:41

## 2021-05-30 RX ADMIN — Medication 100 MILLIGRAM(S): at 05:05

## 2021-05-30 RX ADMIN — Medication 100 MILLIGRAM(S): at 18:36

## 2021-05-30 RX ADMIN — POTASSIUM PHOSPHATE, MONOBASIC POTASSIUM PHOSPHATE, DIBASIC 83.33 MILLIMOLE(S): 236; 224 INJECTION, SOLUTION INTRAVENOUS at 06:23

## 2021-05-30 RX ADMIN — Medication 1000 MILLIGRAM(S): at 12:34

## 2021-05-30 RX ADMIN — Medication 1 MILLIGRAM(S): at 23:48

## 2021-05-30 RX ADMIN — ONDANSETRON 4 MILLIGRAM(S): 8 TABLET, FILM COATED ORAL at 23:48

## 2021-05-30 RX ADMIN — PANTOPRAZOLE SODIUM 40 MILLIGRAM(S): 20 TABLET, DELAYED RELEASE ORAL at 12:37

## 2021-05-30 RX ADMIN — CHLORHEXIDINE GLUCONATE 1 APPLICATION(S): 213 SOLUTION TOPICAL at 05:06

## 2021-05-30 RX ADMIN — GABAPENTIN 100 MILLIGRAM(S): 400 CAPSULE ORAL at 23:47

## 2021-05-30 RX ADMIN — Medication 1 MILLIGRAM(S): at 08:29

## 2021-05-30 RX ADMIN — ENOXAPARIN SODIUM 40 MILLIGRAM(S): 100 INJECTION SUBCUTANEOUS at 12:34

## 2021-05-30 RX ADMIN — Medication 1 APPLICATION(S): at 05:05

## 2021-05-30 RX ADMIN — Medication 1000 UNIT(S): at 12:34

## 2021-05-30 RX ADMIN — SODIUM CHLORIDE 75 MILLILITER(S): 9 INJECTION, SOLUTION INTRAVENOUS at 11:06

## 2021-05-30 RX ADMIN — METHADONE HYDROCHLORIDE 5 MILLIGRAM(S): 40 TABLET ORAL at 05:06

## 2021-05-30 NOTE — PROGRESS NOTE ADULT - CRITICAL CARE SERVICES
36
36
37
38
38
35
35
38
39
34
34
36
36
39
34
36
36
37
37
38
39
34
35
36
38
39
39
36
38
39
34
36
34
36
38
39

## 2021-05-30 NOTE — PROGRESS NOTE ADULT - ATTENDING COMMENTS
55 yr old  man , non smoker with  moody 1990s presented 3/14 with x9 days worsening cough, subjective fevers, and SOB, with x2-3 days dysuria and central, non-radiating, constant CP. Admitted to medicine unit  for acute hypoxic respiratory failure secondary to pna from covid-19 infection .     Assessment:  1. Acute hypoxic respiratory failure  2. Covid-19 infection   3. Transaminitis  4. Prediabetes  5. Bilateral pneumothorax  6. Septic shock - resolved  7. Anemia  8. bowel obstruction/ileus    Plan   - No more episodes of vomiting. CT scan with out evidence of obstruction.   - low rate tube feedings, change formula  - Zofran for nausea control  -Chest tube to water seal this morning  -Small PTX noted at left apex  -S/p tracheostomy placement 4/21   -S/p G-tube 4/23  -trach collar during day time, full vent support overnight as tolerated  -PT evaluation, upright positioning as tolerated  -Tapering dose of steroids as ordered, bactrim for PCP prophylaxis   -C-diff PCR negative  -Cont. movantik, lowered dose due to vomiting,  -dvt/gi prophy  -hemodynamic monitoring   -Prognosis is guarded.

## 2021-05-30 NOTE — PROGRESS NOTE ADULT - SUBJECTIVE AND OBJECTIVE BOX
Patient is a 55y old  Male who presents with a chief complaint of SOB (30 May 2021 00:03)    pt seen in icu [ x ], reg med floor [   ], bed [ x ], chair at bedside [   ], awake and responsive [ x ], mildly sedated, [  ],    nad [x  ]      Allergies    No Known Allergies        Vitals    T(F): 99.3 (05-30-21 @ 04:01), Max: 99.3 (05-30-21 @ 04:01)  HR: 76 (05-30-21 @ 06:00) (60 - 102)  BP: 110/58 (05-30-21 @ 06:00) (97/57 - 158/90)  RR: 22 (05-30-21 @ 06:00) (16 - 36)  SpO2: 100% (05-30-21 @ 06:00) (98% - 100%)  Wt(kg): --  CAPILLARY BLOOD GLUCOSE          Labs                          8.2    7.50  )-----------( 237      ( 30 May 2021 04:49 )             23.3       05-30    141  |  104  |  15  ----------------------------<  79  3.7   |  31  |  0.80    Ca    8.9      30 May 2021 04:49  Phos  1.9     05-30  Mg     1.7     05-30    TPro  5.7<L>  /  Alb  2.5<L>  /  TBili  0.6  /  DBili  x   /  AST  27  /  ALT  32  /  AlkPhos  64  05-30            .Sputum Sputum  04-16 @ 04:42   Moderate Enterobacter aerogenes (Carbapenem Resistant)  Normal Respiratory Monserrat present  --  Enterobacter aerogenes (Carbapenem Resistant)      .Urine Clean Catch (Midstream)  03-15 @ 00:52   No growth  --  --          Radiology Results      Meds    MEDICATIONS  (STANDING):  ascorbic acid 1000 milliGRAM(s) Oral daily  BACItracin   Ointment 1 Application(s) Topical two times a day  calcium carbonate 1250 mG  + Vitamin D (OsCal 500 + D) 1 Tablet(s) Oral daily  chlorhexidine 2% Cloths 1 Application(s) Topical <User Schedule>  cholecalciferol 1000 Unit(s) Oral daily  clonazePAM  Tablet 1 milliGRAM(s) Oral every 8 hours  enoxaparin Injectable 40 milliGRAM(s) SubCutaneous every 24 hours  gabapentin 100 milliGRAM(s) Oral every 8 hours  labetalol 100 milliGRAM(s) Enteral Tube two times a day  lactated ringers. 1000 milliLiter(s) (75 mL/Hr) IV Continuous <Continuous>  methadone    Tablet 5 milliGRAM(s) Oral every 12 hours  naloxegol 12.5 milliGRAM(s) Oral daily  ondansetron Injectable 4 milliGRAM(s) IV Push every 6 hours  pantoprazole  Injectable 40 milliGRAM(s) IV Push daily  predniSONE   Tablet 20 milliGRAM(s) Oral daily  QUEtiapine 50 milliGRAM(s) Oral two times a day  trimethoprim  40 mG/sulfamethoxazole 200 mG Suspension 160 milliGRAM(s) Oral <User Schedule>      MEDICATIONS  (PRN):  acetaminophen    Suspension .. 650 milliGRAM(s) Oral every 12 hours PRN Temp greater or equal to 38C (100.4F)  ALBUTerol    90 MICROgram(s) HFA Inhaler 2 Puff(s) Inhalation every 6 hours PRN Shortness of Breath and/or Wheezing  artificial  tears Solution 1 Drop(s) Both EYES every 4 hours PRN Dry Eyes  bisacodyl Suppository 10 milliGRAM(s) Rectal daily PRN Constipation  ondansetron Injectable 4 milliGRAM(s) IV Push every 6 hours PRN Nausea and/or Vomiting  sodium chloride 0.9% lock flush 10 milliLiter(s) IV Push every 1 hour PRN Pre/post blood products, medications, blood draw, and to maintain line patency      Physical Exam    Neuro :  no focal deficits  Respiratory: CTA B/L  CV: RRR, S1S2, no murmurs,   Abdominal: Soft, NT, ND +BS, gastrostomy tube inplace  Extremities: edema of extrem, + peripheral pulses      ASSESSMENT      Hypoxemia 2nd to covid pna   transaminitis  prediabetes  h/o appendectomy  cholecystectomy        PLAN    cont cont precautions  covid 5/14/21 neg noted above  d/c remdesevir given covid ab positive noted   completed dexamethasone   started pulse steroids for 3 days - 250mg solumedrol bid now tapered off 4/14/21   C/w prednisone 20 daily    cont on bactrim empirically, TIW   cont asa, vit c,    cont albuterol inhaler   pulm f/u  procalcitonin, D-dimer, crp, ldh, ferritin, lactate noted ,    cont tylenol prn,   cont robitussin prn   pt off precedex    pt on methadone   pain mgmt eval noted   off phenylephrine drip for hypotension  clonidine held 2nd to low bp  s/p intubation 3/29/21   s/p tracheostomy 4/21/21  O2 sat  100% (98% - 100%) mv 40%  O2 via mech vent and taper fio2 as tolerated   vent mgmt as per icu  xray 3/19/21 with pneumomediastinum  rept cxr with New trace right apical pneumothorax. New mild left apical pneumothorax. Grossly stable small pneumomediastinum.  Soft tissue emphysema at the neck bases bilaterally. Grossly stable bilateral pulmonary infiltrates noted.   cxr 2/24 with No evidence of pneumothorax can be appreciated on the available image. This may be related to patient positioning. Evidence of pneumomediastinum and subcutaneous emphysema in the lower neck is again   noted. There are patchy bibasilar infiltrates and elevated right hemidiaphragm noted.   cxr 3/29/21 with No significant change bilateral infiltrates. There is a small simple left apical pneumothorax. No significant pleural effusion. Bilateral subcutaneous emphysema similar to prior.   XRs on 7AM and 5PM with no obvious increase of ptx  cxr 4/4/21 with Improving bilateral airspace disease noted    cxr 4/8/21 with Small left pneumothorax noted.  thoracic surg f/u   s/p L chest tube replacement 4/18/21 for recurrence of left pneumothorax   cxr 4/20/21 with Unchanged advanced infiltrates and catheter left chest tube noted   CXR 4/11/21 with : No interval change compared to one day prior. No pneumothorax noted.   unable to flush or aspirate tube fully, noted debris in tubing which was milked out.   Once material removed from tubing able to aspirated and flush fully. Also changed dressing on pigtail which appears to be in good position.   Monitor O2 status   s/p tracheostomy 4/21/21   cxr 4/21/21 with No evidence of active chest disease. Tracheostomy tube in place otherwise no significant change noted   cxr 4/25/21 with A 40% LEFT pneumothorax. LEFT multi-sidehole pigtail catheter overlies LEFT lower hemithorax.. Bilateral multifocal and diffuse ill-defined airspace opacities..  Follow-up AP portable chest radiograph 4/25/2021 AT 8:58 AM: Residual LEFT 30% upper zone pneumothorax. Otherwise no interval change.noted    cxr 4/30/21 Tracheostomy tube again noted. Left chest tube noted with small left apical pneumothorax unchanged. Bilateral airspace opacities overall worsening. Heart size cannot be accurately assessed in this projection noted.   cxr 5/3/21 with Large left pneumothorax noted above.   cxr 5 4/21 with No significant interval change noted above.   ct chest with Extensive chronic appearing consolidative opacities throughout the lungs, most prominent in the left lower lobe and lingula, with bronchiectasis, scarring, and volume loss. Additional scattered peripheral coarse opacities.   Mild left pneumothorax. Left lateral pleural space pigtail catheter, not in continuity with the pneumothorax. Mild bilateral hydronephrosis, similar to appearance on CT of the abdomen and pelvis on April 27. noted above   s/p 2nd chest tube 5/5/21  cxr 5/6/21 with Tracheostomy and catheter left chest tubes remain. , There are significant diffuse advanced infiltrates again noted. No pneumothorax. Above findings are similar to study earlier in the day. Present film shows a left jugular line inserted   with tip entering the superior vena cava noted   cxr 5/11/21 with Heart magnified by technique. Tracheostomy, left jugular line, and 2 catheter left chest tubes remain. Quite advanced infiltration in the lungs particularly in the left lower lobe again noted.  There is a persistent rather small left apical pneumothorax. Chest is similar to May 10 noted    cxt 5/14/21 with Perihilar diffuse airspace disease and LEFT lower lobe retrocardiac airspace consolidation. LEFT chest tube overlies upper zone. Small LEFT apical less than 5% pneumothorax noted   cxr 5/17/21 with Similar infiltrates. Small left pneumothorax similar to the prior study noted.   cxr 5/19/21 with Persistent mild to moderate left pneumothorax despite chest tube noted   cxr 5/20 with Improved left pneumothorax. Significant infiltration particularly in the left lower lobe again noted   cxr 5/21/21 with No appreciable pneumothorax at this time. Persistent advanced infiltrates noted    cxr 5/22/21 with  Left chest pigtail. Status post tracheostomy. Heart size unremarkable. No pneumothorax. Multiple patchy opacities throughout the left lung. Status post gastrostomy. No dilated loops of bowel noted above.   cxr 5/23/21 with Tracheostomy tube and within the left pigtail catheter are unchanged. Stable hazy opacities, left greater than the right. No pneumothorax noted above.   cxr 5/26/21 with Left lower lobe infiltrate lateral left lung infiltrate again seen. Rather small left apical pneumothorax again noted and small right upper lobe infiltrate again seen. Chest is similar to earlier in the day.noted above.  s/p surgical placement of gastrostomy tube 4/23/2021.   abd xray 5/10/21 with ileus noted   dressing changed daily for seroma   abd xray 5/21/21 with Decreased bowel ileus compared to prior 5/20/2021 exam noted above.   abd xray 5/22/21 with No dilated loops of bowel noted above.   cont tube feeding   CT scan of the chest 5/13/21 demonstrates diffuse bilateral infiltrates with a region of consolidation at the left lung base. Small left pneumothorax. 2 left chest tubes noted in place noted above.  CT scan of the abdomen and pelvis 5/13/21 demonstrates diffuse dilatation of small and large bowel loops most consistent with ileus noted   xray abd with  5/14/21 with Ingested contrast within nonobstructed large bowel to the level of rectum. No acute radiographic intra-abdominal findings noted   ct abd-pelv 5/27/21 with Delayed nephrographic phase enhancement of the nonobstructed bilateral kidneys, suggestive of acute renal insufficiency. No evidence of bowel obstruction. Proctitis noted above.   id f/u   patient completed course of Meropenem  leukocytosis resolved  sputum cx with Enterobacter aerogenes (Carbapenem Resistant) noted above   tmx 99.1     andrea changed  Completed  meropenem, s/p 1 dose of Vancomycin   completed zosyn  lispro ss   prognosis poor   s/p 4 units prbc for anemia  f/u h/h    transfuse prbc as needed  heme onc f/u  retic is elevated.    Haptoglobin normal  ? daily phlebotomy  no hemolysis, Fe/B12/folate adequate  hemolysis is unlikely even his baseline haptoglobin may be very elevated due to COVID  direct pamella neg noted    psych f/u  C/w Klonopin to 1 mg q8h standing (no PRNs), with plan to further taper as tolerated  cont Seroquel to 50 mg BID standing starting tonight  Continue delirium precautions: Frequent reorientation, familiar pictures and objects at bedside, natural light in daytime,   consistent sleep/wake schedule, adequate hydration and nutrition, sensory aids (hearing aids, glasses) present if needed, minimizing noise and overstimulation,   clustering care to minimize overnight interruptions, judicious use of deliriogenic medications (anticholinergics, benzodiazepines and opioid analgesics), minimize use of restraints  cont current meds   mgmt as per icu     Patient is a 55y old  Male who presents with a chief complaint of SOB (30 May 2021 00:03)    pt seen in icu [ x ], reg med floor [   ], bed [ x ], chair at bedside [   ], awake and responsive [ x ], mildly sedated, [  ],    nad [x  ]      Allergies    No Known Allergies        Vitals    T(F): 99.3 (05-30-21 @ 04:01), Max: 99.3 (05-30-21 @ 04:01)  HR: 76 (05-30-21 @ 06:00) (60 - 102)  BP: 110/58 (05-30-21 @ 06:00) (97/57 - 158/90)  RR: 22 (05-30-21 @ 06:00) (16 - 36)  SpO2: 100% (05-30-21 @ 06:00) (98% - 100%)  Wt(kg): --  CAPILLARY BLOOD GLUCOSE          Labs                          8.2    7.50  )-----------( 237      ( 30 May 2021 04:49 )             23.3       05-30    141  |  104  |  15  ----------------------------<  79  3.7   |  31  |  0.80    Ca    8.9      30 May 2021 04:49  Phos  1.9     05-30  Mg     1.7     05-30    TPro  5.7<L>  /  Alb  2.5<L>  /  TBili  0.6  /  DBili  x   /  AST  27  /  ALT  32  /  AlkPhos  64  05-30            .Sputum Sputum  04-16 @ 04:42   Moderate Enterobacter aerogenes (Carbapenem Resistant)  Normal Respiratory Monserrat present  --  Enterobacter aerogenes (Carbapenem Resistant)      .Urine Clean Catch (Midstream)  03-15 @ 00:52   No growth  --  --          Radiology Results      Meds    MEDICATIONS  (STANDING):  ascorbic acid 1000 milliGRAM(s) Oral daily  BACItracin   Ointment 1 Application(s) Topical two times a day  calcium carbonate 1250 mG  + Vitamin D (OsCal 500 + D) 1 Tablet(s) Oral daily  chlorhexidine 2% Cloths 1 Application(s) Topical <User Schedule>  cholecalciferol 1000 Unit(s) Oral daily  clonazePAM  Tablet 1 milliGRAM(s) Oral every 8 hours  enoxaparin Injectable 40 milliGRAM(s) SubCutaneous every 24 hours  gabapentin 100 milliGRAM(s) Oral every 8 hours  labetalol 100 milliGRAM(s) Enteral Tube two times a day  lactated ringers. 1000 milliLiter(s) (75 mL/Hr) IV Continuous <Continuous>  methadone    Tablet 5 milliGRAM(s) Oral every 12 hours  naloxegol 12.5 milliGRAM(s) Oral daily  ondansetron Injectable 4 milliGRAM(s) IV Push every 6 hours  pantoprazole  Injectable 40 milliGRAM(s) IV Push daily  predniSONE   Tablet 20 milliGRAM(s) Oral daily  QUEtiapine 50 milliGRAM(s) Oral two times a day  trimethoprim  40 mG/sulfamethoxazole 200 mG Suspension 160 milliGRAM(s) Oral <User Schedule>      MEDICATIONS  (PRN):  acetaminophen    Suspension .. 650 milliGRAM(s) Oral every 12 hours PRN Temp greater or equal to 38C (100.4F)  ALBUTerol    90 MICROgram(s) HFA Inhaler 2 Puff(s) Inhalation every 6 hours PRN Shortness of Breath and/or Wheezing  artificial  tears Solution 1 Drop(s) Both EYES every 4 hours PRN Dry Eyes  bisacodyl Suppository 10 milliGRAM(s) Rectal daily PRN Constipation  ondansetron Injectable 4 milliGRAM(s) IV Push every 6 hours PRN Nausea and/or Vomiting  sodium chloride 0.9% lock flush 10 milliLiter(s) IV Push every 1 hour PRN Pre/post blood products, medications, blood draw, and to maintain line patency      Physical Exam    Neuro :  no focal deficits  Respiratory: CTA B/L  CV: RRR, S1S2, no murmurs,   Abdominal: Soft, NT, ND +BS, gastrostomy tube inplace  Extremities: edema of extrem, + peripheral pulses      ASSESSMENT      Hypoxemia 2nd to covid pna   transaminitis  prediabetes  h/o appendectomy  cholecystectomy        PLAN    cont cont precautions  covid 5/14/21 neg noted above  d/c remdesevir given covid ab positive noted   completed dexamethasone   started pulse steroids for 3 days - 250mg solumedrol bid now tapered off 4/14/21   C/w prednisone 20 daily    cont on bactrim empirically, TIW   cont asa, vit c,    cont albuterol inhaler   pulm f/u  procalcitonin, D-dimer, crp, ldh, ferritin, lactate noted ,    cont tylenol prn,   cont robitussin prn   pt off precedex    pt on methadone   pain mgmt eval noted   off phenylephrine drip for hypotension  clonidine held 2nd to low bp  s/p intubation 3/29/21   s/p tracheostomy 4/21/21  O2 sat  100% (98% - 100%) mv 40%  O2 via mech vent and taper fio2 as tolerated   vent mgmt as per icu  xray 3/19/21 with pneumomediastinum  rept cxr with New trace right apical pneumothorax. New mild left apical pneumothorax. Grossly stable small pneumomediastinum.  Soft tissue emphysema at the neck bases bilaterally. Grossly stable bilateral pulmonary infiltrates noted.   cxr 2/24 with No evidence of pneumothorax can be appreciated on the available image. This may be related to patient positioning. Evidence of pneumomediastinum and subcutaneous emphysema in the lower neck is again   noted. There are patchy bibasilar infiltrates and elevated right hemidiaphragm noted.   cxr 3/29/21 with No significant change bilateral infiltrates. There is a small simple left apical pneumothorax. No significant pleural effusion. Bilateral subcutaneous emphysema similar to prior.   XRs on 7AM and 5PM with no obvious increase of ptx  cxr 4/4/21 with Improving bilateral airspace disease noted    cxr 4/8/21 with Small left pneumothorax noted.  thoracic surg f/u   s/p L chest tube replacement 4/18/21 for recurrence of left pneumothorax   cxr 4/20/21 with Unchanged advanced infiltrates and catheter left chest tube noted   CXR 4/11/21 with : No interval change compared to one day prior. No pneumothorax noted.   unable to flush or aspirate tube fully, noted debris in tubing which was milked out.   Once material removed from tubing able to aspirated and flush fully. Also changed dressing on pigtail which appears to be in good position.   Monitor O2 status   s/p tracheostomy 4/21/21   cxr 4/21/21 with No evidence of active chest disease. Tracheostomy tube in place otherwise no significant change noted   cxr 4/25/21 with A 40% LEFT pneumothorax. LEFT multi-sidehole pigtail catheter overlies LEFT lower hemithorax.. Bilateral multifocal and diffuse ill-defined airspace opacities..  Follow-up AP portable chest radiograph 4/25/2021 AT 8:58 AM: Residual LEFT 30% upper zone pneumothorax. Otherwise no interval change.noted    cxr 4/30/21 Tracheostomy tube again noted. Left chest tube noted with small left apical pneumothorax unchanged. Bilateral airspace opacities overall worsening. Heart size cannot be accurately assessed in this projection noted.   cxr 5/3/21 with Large left pneumothorax noted above.   cxr 5 4/21 with No significant interval change noted above.   ct chest with Extensive chronic appearing consolidative opacities throughout the lungs, most prominent in the left lower lobe and lingula, with bronchiectasis, scarring, and volume loss. Additional scattered peripheral coarse opacities.   Mild left pneumothorax. Left lateral pleural space pigtail catheter, not in continuity with the pneumothorax. Mild bilateral hydronephrosis, similar to appearance on CT of the abdomen and pelvis on April 27. noted above   s/p 2nd chest tube 5/5/21  cxr 5/6/21 with Tracheostomy and catheter left chest tubes remain. , There are significant diffuse advanced infiltrates again noted. No pneumothorax. Above findings are similar to study earlier in the day. Present film shows a left jugular line inserted   with tip entering the superior vena cava noted   cxr 5/11/21 with Heart magnified by technique. Tracheostomy, left jugular line, and 2 catheter left chest tubes remain. Quite advanced infiltration in the lungs particularly in the left lower lobe again noted.  There is a persistent rather small left apical pneumothorax. Chest is similar to May 10 noted    cxt 5/14/21 with Perihilar diffuse airspace disease and LEFT lower lobe retrocardiac airspace consolidation. LEFT chest tube overlies upper zone. Small LEFT apical less than 5% pneumothorax noted   cxr 5/17/21 with Similar infiltrates. Small left pneumothorax similar to the prior study noted.   cxr 5/19/21 with Persistent mild to moderate left pneumothorax despite chest tube noted   cxr 5/20 with Improved left pneumothorax. Significant infiltration particularly in the left lower lobe again noted   cxr 5/21/21 with No appreciable pneumothorax at this time. Persistent advanced infiltrates noted    cxr 5/22/21 with  Left chest pigtail. Status post tracheostomy. Heart size unremarkable. No pneumothorax. Multiple patchy opacities throughout the left lung. Status post gastrostomy. No dilated loops of bowel noted above.   cxr 5/23/21 with Tracheostomy tube and within the left pigtail catheter are unchanged. Stable hazy opacities, left greater than the right. No pneumothorax noted above.   cxr 5/26/21 with Left lower lobe infiltrate lateral left lung infiltrate again seen. Rather small left apical pneumothorax again noted and small right upper lobe infiltrate again seen. Chest is similar to earlier in the day.noted above.  s/p surgical placement of gastrostomy tube 4/23/2021.   abd xray 5/10/21 with ileus noted   dressing changed daily for seroma   abd xray 5/21/21 with Decreased bowel ileus compared to prior 5/20/2021 exam noted above.   abd xray 5/22/21 with No dilated loops of bowel noted above.   cont tube feeding   CT scan of the chest 5/13/21 demonstrates diffuse bilateral infiltrates with a region of consolidation at the left lung base. Small left pneumothorax. 2 left chest tubes noted in place noted above.  CT scan of the abdomen and pelvis 5/13/21 demonstrates diffuse dilatation of small and large bowel loops most consistent with ileus noted   xray abd with  5/14/21 with Ingested contrast within nonobstructed large bowel to the level of rectum. No acute radiographic intra-abdominal findings noted   ct abd-pelv 5/27/21 with Delayed nephrographic phase enhancement of the nonobstructed bilateral kidneys, suggestive of acute renal insufficiency. No evidence of bowel obstruction. Proctitis noted above.   id f/u   patient completed course of Meropenem  leukocytosis resolved  sputum cx with Enterobacter aerogenes (Carbapenem Resistant) noted above   tmx 99.3     andrea changed  Completed  meropenem, s/p 1 dose of Vancomycin   completed zosyn  lispro ss   prognosis poor   s/p 4 units prbc for anemia  f/u h/h    transfuse prbc as needed  heme onc f/u  retic is elevated.    Haptoglobin normal  ? daily phlebotomy  no hemolysis, Fe/B12/folate adequate  hemolysis is unlikely even his baseline haptoglobin may be very elevated due to COVID  direct pamella neg noted    psych f/u  C/w Klonopin to 1 mg q8h standing (no PRNs), with plan to further taper as tolerated  cont Seroquel to 50 mg BID standing starting tonight  Continue delirium precautions: Frequent reorientation, familiar pictures and objects at bedside, natural light in daytime,   consistent sleep/wake schedule, adequate hydration and nutrition, sensory aids (hearing aids, glasses) present if needed, minimizing noise and overstimulation,   clustering care to minimize overnight interruptions, judicious use of deliriogenic medications (anticholinergics, benzodiazepines and opioid analgesics), minimize use of restraints  cont current meds   mgmt as per icu

## 2021-05-30 NOTE — PROGRESS NOTE ADULT - CRITICAL CARE SERVICES PROVIDED
Critical care services provided

## 2021-05-30 NOTE — PROGRESS NOTE ADULT - SUBJECTIVE AND OBJECTIVE BOX
INTERVAL HPI/OVERNIGHT EVENTS: *** Patient was seen and examined at bed side.     PRESSORS: [ ] YES [ ] NO  WHICH:    ANTIBIOTICS:                  DATE STARTED:  ANTIBIOTICS:                  DATE STARTED:  ANTIBIOTICS:                  DATE STARTED:    Antimicrobial:  trimethoprim  40 mG/sulfamethoxazole 200 mG Suspension 160 milliGRAM(s) Oral <User Schedule>    Cardiovascular:  labetalol 100 milliGRAM(s) Enteral Tube two times a day    Pulmonary:  ALBUTerol    90 MICROgram(s) HFA Inhaler 2 Puff(s) Inhalation every 6 hours PRN    Hematalogic:  enoxaparin Injectable 40 milliGRAM(s) SubCutaneous every 24 hours    Other:  acetaminophen    Suspension .. 650 milliGRAM(s) Oral every 12 hours PRN  artificial  tears Solution 1 Drop(s) Both EYES every 4 hours PRN  ascorbic acid 1000 milliGRAM(s) Oral daily  BACItracin   Ointment 1 Application(s) Topical two times a day  bisacodyl Suppository 10 milliGRAM(s) Rectal daily PRN  calcium carbonate 1250 mG  + Vitamin D (OsCal 500 + D) 1 Tablet(s) Oral daily  chlorhexidine 2% Cloths 1 Application(s) Topical <User Schedule>  cholecalciferol 1000 Unit(s) Oral daily  clonazePAM  Tablet 1 milliGRAM(s) Oral every 8 hours  gabapentin 100 milliGRAM(s) Oral every 8 hours  lactated ringers. 1000 milliLiter(s) IV Continuous <Continuous>  methadone    Tablet 5 milliGRAM(s) Oral every 12 hours  naloxegol 12.5 milliGRAM(s) Oral daily  ondansetron Injectable 4 milliGRAM(s) IV Push every 6 hours  ondansetron Injectable 4 milliGRAM(s) IV Push every 6 hours PRN  pantoprazole  Injectable 40 milliGRAM(s) IV Push daily  predniSONE   Tablet 20 milliGRAM(s) Oral daily  QUEtiapine 50 milliGRAM(s) Oral two times a day  sodium chloride 0.9% lock flush 10 milliLiter(s) IV Push every 1 hour PRN    acetaminophen    Suspension .. 650 milliGRAM(s) Oral every 12 hours PRN  ALBUTerol    90 MICROgram(s) HFA Inhaler 2 Puff(s) Inhalation every 6 hours PRN  artificial  tears Solution 1 Drop(s) Both EYES every 4 hours PRN  ascorbic acid 1000 milliGRAM(s) Oral daily  BACItracin   Ointment 1 Application(s) Topical two times a day  bisacodyl Suppository 10 milliGRAM(s) Rectal daily PRN  calcium carbonate 1250 mG  + Vitamin D (OsCal 500 + D) 1 Tablet(s) Oral daily  chlorhexidine 2% Cloths 1 Application(s) Topical <User Schedule>  cholecalciferol 1000 Unit(s) Oral daily  clonazePAM  Tablet 1 milliGRAM(s) Oral every 8 hours  enoxaparin Injectable 40 milliGRAM(s) SubCutaneous every 24 hours  gabapentin 100 milliGRAM(s) Oral every 8 hours  labetalol 100 milliGRAM(s) Enteral Tube two times a day  lactated ringers. 1000 milliLiter(s) IV Continuous <Continuous>  methadone    Tablet 5 milliGRAM(s) Oral every 12 hours  naloxegol 12.5 milliGRAM(s) Oral daily  ondansetron Injectable 4 milliGRAM(s) IV Push every 6 hours  ondansetron Injectable 4 milliGRAM(s) IV Push every 6 hours PRN  pantoprazole  Injectable 40 milliGRAM(s) IV Push daily  predniSONE   Tablet 20 milliGRAM(s) Oral daily  QUEtiapine 50 milliGRAM(s) Oral two times a day  sodium chloride 0.9% lock flush 10 milliLiter(s) IV Push every 1 hour PRN  trimethoprim  40 mG/sulfamethoxazole 200 mG Suspension 160 milliGRAM(s) Oral <User Schedule>    Drug Dosing Weight  Height (cm): 167.6 (23 Apr 2021 23:15)  Weight (kg): 83.9 (23 Apr 2021 23:15)  BMI (kg/m2): 29.9 (23 Apr 2021 23:15)  BSA (m2): 1.93 (23 Apr 2021 23:15)    CENTRAL LINE: [ ] YES [ ] NO  LOCATION:   DATE INSERTED:  REMOVE: [ ] YES [ ] NO  EXPLAIN:    RIVERA: [ ] YES [ ] NO    DATE INSERTED:  REMOVE:  [ ] YES [ ] NO  EXPLAIN:    A-LINE:  [ ] YES [ ] NO  LOCATION:   DATE INSERTED:  REMOVE:  [ ] YES [ ] NO  EXPLAIN:        ICU Vital Signs Last 24 Hrs  T(C): 36.6 (29 May 2021 23:48), Max: 37 (29 May 2021 16:35)  T(F): 97.8 (29 May 2021 23:48), Max: 98.6 (29 May 2021 16:35)  HR: 60 (29 May 2021 21:00) (59 - 102)  BP: 137/75 (29 May 2021 21:00) (104/65 - 158/90)  BP(mean): 88 (29 May 2021 21:00) (72 - 106)  ABP: --  ABP(mean): --  RR: 22 (29 May 2021 21:00) (16 - 36)  SpO2: 99% (29 May 2021 21:00) (98% - 100%)            05-28 @ 07:01  -  05-29 @ 07:00  --------------------------------------------------------  IN: 2425 mL / OUT: 830 mL / NET: 1595 mL        Mode: AC/ CMV (Assist Control/ Continuous Mandatory Ventilation)  RR (machine): 22  TV (machine): 450  FiO2: 40  PEEP: 5  ITime: 0.7  MAP: 16  PIP: 43      PHYSICAL EXAM:    GENERAL:NAD, well-groomed, well-developed  HEAD:  Atraumatic, Normocephalic  EYES: EOMI, PERRLA, conjunctiva and sclera clear  ENMT: No tonsillar erythema, exudates, or enlargement; Moist mucous membranes, Good dentition, No lesions  NECK: Supple, normal appearance, No JVD; Normal thyroid; Trachea midline  NERVOUS SYSTEM: Alert & Oriented X3, Good concentration; Motor Strength 5/5 B/L upper and lower extremities; DTRs 2+ intact and symmetric  CHEST/LUNG: No chest deformity;Normal percussion bilaterally; No rales, rhonchi, wheezing   HEART: Regular rate and rhythm; No murmurs, rubs, or gallops  ABDOMEN: Soft, Nontender, Nondistended; Bowel sounds present  EXTREMITIES: 2+ Peripheral Pulses, No clubbing, cyanosis, or edema  LYMPH: No lymphadenopathy noted  SKIN:No rashes or lesions; Good capillary refill      LABS:  CBC Full  -  ( 29 May 2021 05:49 )  WBC Count : 8.04 K/uL  RBC Count : 2.44 M/uL  Hemoglobin : 8.4 g/dL  Hematocrit : 25.7 %  Platelet Count - Automated : 240 K/uL  Mean Cell Volume : 105.3 fl  Mean Cell Hemoglobin : 34.4 pg  Mean Cell Hemoglobin Concentration : 32.7 gm/dL  Auto Neutrophil # : x  Auto Lymphocyte # : x  Auto Monocyte # : x  Auto Eosinophil # : x  Auto Basophil # : x  Auto Neutrophil % : x  Auto Lymphocyte % : x  Auto Monocyte % : x  Auto Eosinophil % : x  Auto Basophil % : x    05-29    141  |  103  |  16  ----------------------------<  82  3.4<L>   |  30  |  0.76    Ca    8.6      29 May 2021 05:47  Phos  2.5     05-29  Mg     1.8     05-29    TPro  5.9<L>  /  Alb  2.6<L>  /  TBili  0.7  /  DBili  x   /  AST  23  /  ALT  37  /  AlkPhos  72  05-29            RADIOLOGY & ADDITIONAL STUDIES REVIEWED:  ***    GOALS OF CARE DISCUSSION WITH PATIENT/FAMILY/PROXY: full code/DNR/DNI    CRITICAL CARE TIME SPENT: 35 minutes INTERVAL HPI/OVERNIGHT EVENTS: Patient was seen and examined at bed side. Patient was on trach collar throughout the day yesterday and placed back on vent overnight. No other acute events overnight.      PRESSORS: No    Antimicrobial:  trimethoprim  40 mG/sulfamethoxazole 200 mG Suspension 160 milliGRAM(s) Oral <User Schedule>    Cardiovascular:  labetalol 100 milliGRAM(s) Enteral Tube two times a day    Pulmonary:  ALBUTerol    90 MICROgram(s) HFA Inhaler 2 Puff(s) Inhalation every 6 hours PRN    Hematalogic:  enoxaparin Injectable 40 milliGRAM(s) SubCutaneous every 24 hours    Other:  acetaminophen    Suspension .. 650 milliGRAM(s) Oral every 12 hours PRN  artificial  tears Solution 1 Drop(s) Both EYES every 4 hours PRN  ascorbic acid 1000 milliGRAM(s) Oral daily  BACItracin   Ointment 1 Application(s) Topical two times a day  bisacodyl Suppository 10 milliGRAM(s) Rectal daily PRN  calcium carbonate 1250 mG  + Vitamin D (OsCal 500 + D) 1 Tablet(s) Oral daily  chlorhexidine 2% Cloths 1 Application(s) Topical <User Schedule>  cholecalciferol 1000 Unit(s) Oral daily  clonazePAM  Tablet 1 milliGRAM(s) Oral every 8 hours  gabapentin 100 milliGRAM(s) Oral every 8 hours  lactated ringers. 1000 milliLiter(s) IV Continuous <Continuous>  methadone    Tablet 5 milliGRAM(s) Oral every 12 hours  naloxegol 12.5 milliGRAM(s) Oral daily  ondansetron Injectable 4 milliGRAM(s) IV Push every 6 hours  ondansetron Injectable 4 milliGRAM(s) IV Push every 6 hours PRN  pantoprazole  Injectable 40 milliGRAM(s) IV Push daily  predniSONE   Tablet 20 milliGRAM(s) Oral daily  QUEtiapine 50 milliGRAM(s) Oral two times a day  sodium chloride 0.9% lock flush 10 milliLiter(s) IV Push every 1 hour PRN    acetaminophen    Suspension .. 650 milliGRAM(s) Oral every 12 hours PRN  ALBUTerol    90 MICROgram(s) HFA Inhaler 2 Puff(s) Inhalation every 6 hours PRN  artificial  tears Solution 1 Drop(s) Both EYES every 4 hours PRN  ascorbic acid 1000 milliGRAM(s) Oral daily  BACItracin   Ointment 1 Application(s) Topical two times a day  bisacodyl Suppository 10 milliGRAM(s) Rectal daily PRN  calcium carbonate 1250 mG  + Vitamin D (OsCal 500 + D) 1 Tablet(s) Oral daily  chlorhexidine 2% Cloths 1 Application(s) Topical <User Schedule>  cholecalciferol 1000 Unit(s) Oral daily  clonazePAM  Tablet 1 milliGRAM(s) Oral every 8 hours  enoxaparin Injectable 40 milliGRAM(s) SubCutaneous every 24 hours  gabapentin 100 milliGRAM(s) Oral every 8 hours  labetalol 100 milliGRAM(s) Enteral Tube two times a day  lactated ringers. 1000 milliLiter(s) IV Continuous <Continuous>  methadone    Tablet 5 milliGRAM(s) Oral every 12 hours  naloxegol 12.5 milliGRAM(s) Oral daily  ondansetron Injectable 4 milliGRAM(s) IV Push every 6 hours  ondansetron Injectable 4 milliGRAM(s) IV Push every 6 hours PRN  pantoprazole  Injectable 40 milliGRAM(s) IV Push daily  predniSONE   Tablet 20 milliGRAM(s) Oral daily  QUEtiapine 50 milliGRAM(s) Oral two times a day  sodium chloride 0.9% lock flush 10 milliLiter(s) IV Push every 1 hour PRN  trimethoprim  40 mG/sulfamethoxazole 200 mG Suspension 160 milliGRAM(s) Oral <User Schedule>    Drug Dosing Weight  Height (cm): 167.6 (23 Apr 2021 23:15)  Weight (kg): 83.9 (23 Apr 2021 23:15)  BMI (kg/m2): 29.9 (23 Apr 2021 23:15)  BSA (m2): 1.93 (23 Apr 2021 23:15)    CENTRAL LINE: No     RIVERA: Yes     A-LINE: No      ICU Vital Signs Last 24 Hrs  T(C): 36.6 (29 May 2021 23:48), Max: 37 (29 May 2021 16:35)  T(F): 97.8 (29 May 2021 23:48), Max: 98.6 (29 May 2021 16:35)  HR: 60 (29 May 2021 21:00) (59 - 102)  BP: 137/75 (29 May 2021 21:00) (104/65 - 158/90)  BP(mean): 88 (29 May 2021 21:00) (72 - 106)  RR: 22 (29 May 2021 21:00) (16 - 36)  SpO2: 99% (29 May 2021 21:00) (98% - 100%)      05-28 @ 07:01  -  05-29 @ 07:00  --------------------------------------------------------  IN: 2425 mL / OUT: 830 mL / NET: 1595 mL      Mode: AC/ CMV (Assist Control/ Continuous Mandatory Ventilation)  RR (machine): 22  TV (machine): 450  FiO2: 40  PEEP: 5  ITime: 0.7  MAP: 16  PIP: 43      PHYSICAL EXAM:  GENERAL: patient seen resting in bed and in no acute distress   HEAD: Atraumatic, Normocephalic  EYES: PERRLA, conjunctiva and sclera clear  ENMT: No tonsillar erythema, exudates, or enlargement   NECK: Supple, normal appearance   NERVOUS SYSTEM: Alert & Oriented, Good concentration   CHEST/LUNG: No chest deformity; crackles present to auscultation; left anterior chest tube in place    HEART: Regular rate and rhythm; No murmurs  ABDOMEN: Soft, Nontender, Nondistended; Bowel sounds present  EXTREMITIES: No clubbing, cyanosis, or edema  LYMPH: No lymphadenopathy noted  SKIN: No rashes or lesions; Good capillary refill      LABS:  CBC Full  -  ( 29 May 2021 05:49 )  WBC Count : 8.04 K/uL  RBC Count : 2.44 M/uL  Hemoglobin : 8.4 g/dL  Hematocrit : 25.7 %  Platelet Count - Automated : 240 K/uL  Mean Cell Volume : 105.3 fl  Mean Cell Hemoglobin : 34.4 pg  Mean Cell Hemoglobin Concentration : 32.7 gm/dL      05-29    141  |  103  |  16  ----------------------------<  82  3.4<L>   |  30  |  0.76    Ca    8.6      29 May 2021 05:47  Phos  2.5     05-29  Mg     1.8     05-29    TPro  5.9<L>  /  Alb  2.6<L>  /  TBili  0.7  /  DBili  x   /  AST  23  /  ALT  37  /  AlkPhos  72  05-29      RADIOLOGY & ADDITIONAL STUDIES REVIEWED: < from: Xray Chest 1 View- PORTABLE-Routine (Xray Chest 1 View- PORTABLE-Routine in AM.) (05.29.21 @ 10:56) >  IMPRESSION:  Tracheostomy cannula single left chest pigtail catheter reidentified in situ. No significant change small simple left apical pneumothorax. No significant change bilateral interstitial and airspace opacities. No new abnormality.    < end of copied text >    GOALS OF CARE DISCUSSION WITH PATIENT/FAMILY/PROXY: full code     CRITICAL CARE TIME SPENT: 35 minutes INTERVAL HPI/OVERNIGHT EVENTS: Patient was seen and examined at bed side. Patient was on trach collar throughout the day yesterday and placed back on vent overnight. No other acute events overnight.  Replaced back on trach collar this AM. PTX stable. Possible DC CT tomorrow if still stable.     PRESSORS: No    Antimicrobial:  trimethoprim  40 mG/sulfamethoxazole 200 mG Suspension 160 milliGRAM(s) Oral <User Schedule>    Cardiovascular:  labetalol 100 milliGRAM(s) Enteral Tube two times a day    Pulmonary:  ALBUTerol    90 MICROgram(s) HFA Inhaler 2 Puff(s) Inhalation every 6 hours PRN    Hematalogic:  enoxaparin Injectable 40 milliGRAM(s) SubCutaneous every 24 hours    Other:  acetaminophen    Suspension .. 650 milliGRAM(s) Oral every 12 hours PRN  artificial  tears Solution 1 Drop(s) Both EYES every 4 hours PRN  ascorbic acid 1000 milliGRAM(s) Oral daily  BACItracin   Ointment 1 Application(s) Topical two times a day  bisacodyl Suppository 10 milliGRAM(s) Rectal daily PRN  calcium carbonate 1250 mG  + Vitamin D (OsCal 500 + D) 1 Tablet(s) Oral daily  chlorhexidine 2% Cloths 1 Application(s) Topical <User Schedule>  cholecalciferol 1000 Unit(s) Oral daily  clonazePAM  Tablet 1 milliGRAM(s) Oral every 8 hours  gabapentin 100 milliGRAM(s) Oral every 8 hours  lactated ringers. 1000 milliLiter(s) IV Continuous <Continuous>  methadone    Tablet 5 milliGRAM(s) Oral every 12 hours  naloxegol 12.5 milliGRAM(s) Oral daily  ondansetron Injectable 4 milliGRAM(s) IV Push every 6 hours  ondansetron Injectable 4 milliGRAM(s) IV Push every 6 hours PRN  pantoprazole  Injectable 40 milliGRAM(s) IV Push daily  predniSONE   Tablet 20 milliGRAM(s) Oral daily  QUEtiapine 50 milliGRAM(s) Oral two times a day  sodium chloride 0.9% lock flush 10 milliLiter(s) IV Push every 1 hour PRN    acetaminophen    Suspension .. 650 milliGRAM(s) Oral every 12 hours PRN  ALBUTerol    90 MICROgram(s) HFA Inhaler 2 Puff(s) Inhalation every 6 hours PRN  artificial  tears Solution 1 Drop(s) Both EYES every 4 hours PRN  ascorbic acid 1000 milliGRAM(s) Oral daily  BACItracin   Ointment 1 Application(s) Topical two times a day  bisacodyl Suppository 10 milliGRAM(s) Rectal daily PRN  calcium carbonate 1250 mG  + Vitamin D (OsCal 500 + D) 1 Tablet(s) Oral daily  chlorhexidine 2% Cloths 1 Application(s) Topical <User Schedule>  cholecalciferol 1000 Unit(s) Oral daily  clonazePAM  Tablet 1 milliGRAM(s) Oral every 8 hours  enoxaparin Injectable 40 milliGRAM(s) SubCutaneous every 24 hours  gabapentin 100 milliGRAM(s) Oral every 8 hours  labetalol 100 milliGRAM(s) Enteral Tube two times a day  lactated ringers. 1000 milliLiter(s) IV Continuous <Continuous>  methadone    Tablet 5 milliGRAM(s) Oral every 12 hours  naloxegol 12.5 milliGRAM(s) Oral daily  ondansetron Injectable 4 milliGRAM(s) IV Push every 6 hours  ondansetron Injectable 4 milliGRAM(s) IV Push every 6 hours PRN  pantoprazole  Injectable 40 milliGRAM(s) IV Push daily  predniSONE   Tablet 20 milliGRAM(s) Oral daily  QUEtiapine 50 milliGRAM(s) Oral two times a day  sodium chloride 0.9% lock flush 10 milliLiter(s) IV Push every 1 hour PRN  trimethoprim  40 mG/sulfamethoxazole 200 mG Suspension 160 milliGRAM(s) Oral <User Schedule>    Drug Dosing Weight  Height (cm): 167.6 (23 Apr 2021 23:15)  Weight (kg): 83.9 (23 Apr 2021 23:15)  BMI (kg/m2): 29.9 (23 Apr 2021 23:15)  BSA (m2): 1.93 (23 Apr 2021 23:15)    CENTRAL LINE: No     RIVERA: Yes     A-LINE: No      ICU Vital Signs Last 24 Hrs  T(C): 36.6 (29 May 2021 23:48), Max: 37 (29 May 2021 16:35)  T(F): 97.8 (29 May 2021 23:48), Max: 98.6 (29 May 2021 16:35)  HR: 60 (29 May 2021 21:00) (59 - 102)  BP: 137/75 (29 May 2021 21:00) (104/65 - 158/90)  BP(mean): 88 (29 May 2021 21:00) (72 - 106)  RR: 22 (29 May 2021 21:00) (16 - 36)  SpO2: 99% (29 May 2021 21:00) (98% - 100%)      05-28 @ 07:01  -  05-29 @ 07:00  --------------------------------------------------------  IN: 2425 mL / OUT: 830 mL / NET: 1595 mL      Mode: AC/ CMV (Assist Control/ Continuous Mandatory Ventilation)  RR (machine): 22  TV (machine): 450  FiO2: 40  PEEP: 5  ITime: 0.7  MAP: 16  PIP: 43      PHYSICAL EXAM:  GENERAL: patient seen resting in bed and in no acute distress   HEAD: Atraumatic, Normocephalic  EYES: PERRLA, conjunctiva and sclera clear  ENMT: No tonsillar erythema, exudates, or enlargement   NECK: Supple, normal appearance   NERVOUS SYSTEM: Alert & Oriented, Good concentration   CHEST/LUNG: No chest deformity; crackles present to auscultation; left anterior chest tube in place    HEART: Regular rate and rhythm; No murmurs  ABDOMEN: Soft, Nontender, Nondistended; Bowel sounds present  EXTREMITIES: No clubbing, cyanosis, or edema  LYMPH: No lymphadenopathy noted  SKIN: No rashes or lesions; Good capillary refill      LABS:  CBC Full  -  ( 29 May 2021 05:49 )  WBC Count : 8.04 K/uL  RBC Count : 2.44 M/uL  Hemoglobin : 8.4 g/dL  Hematocrit : 25.7 %  Platelet Count - Automated : 240 K/uL  Mean Cell Volume : 105.3 fl  Mean Cell Hemoglobin : 34.4 pg  Mean Cell Hemoglobin Concentration : 32.7 gm/dL      05-29    141  |  103  |  16  ----------------------------<  82  3.4<L>   |  30  |  0.76    Ca    8.6      29 May 2021 05:47  Phos  2.5     05-29  Mg     1.8     05-29    TPro  5.9<L>  /  Alb  2.6<L>  /  TBili  0.7  /  DBili  x   /  AST  23  /  ALT  37  /  AlkPhos  72  05-29      RADIOLOGY & ADDITIONAL STUDIES REVIEWED: < from: Xray Chest 1 View- PORTABLE-Routine (Xray Chest 1 View- PORTABLE-Routine in AM.) (05.29.21 @ 10:56) >  IMPRESSION:  Tracheostomy cannula single left chest pigtail catheter reidentified in situ. No significant change small simple left apical pneumothorax. No significant change bilateral interstitial and airspace opacities. No new abnormality.    < end of copied text >    GOALS OF CARE DISCUSSION WITH PATIENT/FAMILY/PROXY: full code     CRITICAL CARE TIME SPENT: 35 minutes

## 2021-05-30 NOTE — PROGRESS NOTE ADULT - ASSESSMENT
Assessment and plan: 56 y/o M with no PMH is admitted to ICU for AHRF 2/2 covid19 pna     1. Acute hypoxic respiratory failure  2. ARDS 2/2 Covid pneumonia  3. Pneumothorax  4. Prediabetes  5. Abnormal TSH     Neuro  -Alert and oriented x 3 at baseline   -Off all drips, and calm  -C/w Klonopin to 1mg q8h  -C/w Seroquel 75 mg BID   -Decreased methadone to 10/5/10  - d/c Ativan PRN, fentanyl PRN  -CT head unremarkable   -Low concern for malignant hypothermia, NMS, or serotonin syndrome given waxing/waning and lack of inciting medications    Cardiovascular  # Shock   now off pressors   Clonidine was d/c    Hypertension:  BP was found to be consistently elevated  Was started on labetalol 100mg bid     Pulm  #Acute hypoxic respiratory failure: secondary to Covid19 pneumonia  #ARDS  -Was on HFNC then got intubated 3/29  -Trach 4/22 continues on Vent   - Remdesivir was discontinued due to positive antibodies   - Finished Dexamethasone   - Covid19 PCR negative now, off isolation   - Daily SBT  Tolerated well today, will put on trach collar  Full vent support overnight    # Bacterial Pneumonia  - Stable infiltrate on CXR ,likely developed Bacterial pneumonia   - Completed ABx Zosyn, meropenem, s/p 1 dose of Vancomycin  - MRSA negative from 3/26, 5/18  - Sputum Cx growing few gram negative rods, CRE - Contact isolation  - Taper prednisone to 30 daily (5/11) for possible organizing pneumonia   - C/w bactrim empirically, TIW for PCP PPX  - Secretions from the trach, needs frequent suctions   - Pulm. Dr. Ryan  - ID Dr Anand   - Was found to have fevers 5/17  Was started on vanc and zosyn  MRSA is negative, will d/c vanc  zosyn now dc     #Pneumothorax and pneumomediastinum:   -Thoracic surgery following  -s/p Chest tube placed 4/8 pigtail to wall suction and d/c on 4/15  -On 4/18 chest tube replaced for recurrence of PTX- c/w chest tube to suction     -anterior chest tube placed 5/6 for worsening pneumothorax with resolution of PTX  5/10 CXR with recurrence of apical pneumo - tube adjusted with improvement however still persisting but stable  -Repeat Chest CT shows persistent PTX  -Water seal inferior CT -PTX was increased, readjusted tube as it was kinked.  - Lateral Ct was removed, repeat CXr showed developing of PTX in left lower lobe, resolving slowly  -Surgery was informed, following the case closely  - 5/23:  place CT to waterseal  5/24 reformation of PTX  5/24 CT to suction, CXR improvement  5/26 CT placed on water seal, c/w water seal  5/27 repeat CXR shows stable PTX  5/28 stable PTX      ID  Likely bacterial pneumonia   -leukocytosis improved 9k  -Febrile 100.4 5/18 - last episode  Was started on vanc and zosyn  MRSA is negative, will d/c vanc  dc zosyn 5/22 s/p 5 days of zosyn    Nephro  -Pitting edema to both arms, ecchymosis on right AC, blisters on left arm around brachial area    -Was retaining urine, andrea was placed 5/5  -DC doxazosin for borderline BPs  potassium and phosphorus replaced this morning; monitor electrolytes     JARRED:  Patient has elevated creatinine compared to baseline, creatinine clearance is trending up.  Will start on mild IV hydration  ua shows proteinuria, triple phosphate crystals, large blood >50 rbc  FENA 6.6%, post obstructive  Monitor BMP    GI  Transaminitis:   likely secondary to Covid19, Resolved   hepatitis panel -ve  -continue to monitor LFT    Ileus  Abd xray with ileus, CT showing the same 5/15  Restart trickle feeds  G Tube off suction now  Rectal tube in place  C/w Miralax BID standing and reglan   G tube 4/23, - dressing changed daily for seroma  Patient was found to have ileus again on 5/20  Was started on lactulose suppository  slowly start on tube feeds  Cdiff negative  Start movantik    Vomiting  Patient had multiple episodes of vomiting for past 24 htrs  Was given zofran  Tube feeds were held, restarted 5/28  Xray abdomen showed distension, likely worsening of ileus  Will do CT abdomen with IV contrast.  Surgery was informed      Heme  Elevated d-dimer: likely secondary to Covid19   -D-dimer 423 on admission  -Last D-dimer 1434  - No active bleeding, restarted lovenox daily    Anemia  S/p 4 PRBC total  - Now Hg stable 7-9  CTH and CT abdomen w/o contrast no evidence of bleed    No active signs of bleeding (No hematemesis, melena or hematuria)  Hemolysis w/u- negative  Iron studies show ACD  Dr Andrade on board   F/u CBC daily     Endocrine  Prediabetes:  -A1c 5.8  -BS controlled  -continue HSS    Abnormal TSH:  -TSH level noted 0.26,   -Repeat TSH 0.37 and Free T4 1.82    Skin/Catheters  No rashes. Peripheral IV lines.   Both arms with edema, right AC with ecchymosis  doppler of LUE was negative    Prophylaxis   On Lovenox for DVT   Protonix for GI proph    GOC  FULL CODE Assessment and plan: 54 y/o M with no PMH is admitted to ICU for AHRF 2/2 covid19 pna     1. Acute hypoxic respiratory failure  2. ARDS 2/2 Covid pneumonia  3. Pneumothorax  4. Prediabetes  5. Abnormal TSH     Neuro  -Alert and oriented x 3 at baseline   -Off all drips, and calm  -C/w Klonopin to 1mg q8h  -C/w Seroquel 50 mg BID   -Decreased methadone to 5 BID  - d/c Ativan PRN, fentanyl PRN  -CT head unremarkable   -Low concern for malignant hypothermia, NMS, or serotonin syndrome given waxing/waning and lack of inciting medications    Cardiovascular  # Shock   now off pressors   Clonidine was d/c    Hypertension:  BP was found to be consistently elevated  Was started on labetalol 100mg bid     Pulm  #Acute hypoxic respiratory failure: secondary to Covid19 pneumonia  #ARDS  -Was on HFNC then got intubated 3/29  -Trach 4/22 continues on Vent   - Remdesivir was discontinued due to positive antibodies   - Finished Dexamethasone   - Covid19 PCR negative now, off isolation   - Daily SBT  Trach collar during day - tolerating well  Full vent support overnight    # Bacterial Pneumonia  - Stable infiltrate on CXR ,likely developed Bacterial pneumonia   - Completed ABx Zosyn, meropenem, s/p 1 dose of Vancomycin  - MRSA negative from 3/26, 5/18  - Sputum Cx growing few gram negative rods, CRE - Contact isolation  - Taper prednisone slowly for possible organizing pneumonia   - C/w bactrim empirically, TIW for PCP PPX  - Secretions from the trach, needs frequent suctions   - Pulm. Dr. Ryan  - ID Dr Anand   - Was found to have fevers 5/17  Was started on vanc and zosyn  MRSA is negative, will d/c vanc  zosyn now dc     #Pneumothorax and pneumomediastinum:   -Thoracic surgery following  -s/p Chest tube placed 4/8 pigtail to wall suction and d/c on 4/15  -On 4/18 chest tube replaced for recurrence of PTX- c/w chest tube to suction     -anterior chest tube placed 5/6 for worsening pneumothorax with resolution of PTX  5/10 CXR with recurrence of apical pneumo - tube adjusted with improvement however still persisting but stable  -Repeat Chest CT shows persistent PTX  -Water seal inferior CT -PTX was increased, readjusted tube as it was kinked.  - Lateral Ct was removed, repeat CXr showed developing of PTX in left lower lobe, resolving slowly  -Surgery was informed, following the case closely  - 5/23:  place CT to waterseal  5/24 reformation of PTX  5/24 CT to suction, CXR improvement  5/26 CT placed on water seal, c/w water seal  PTX stable - consider DC CT in AM if still stable    ID  Likely bacterial pneumonia   -leukocytosis improved 9k  -Febrile 100.4 5/18 - last episode  Was started on vanc and zosyn  MRSA is negative, will d/c vanc  dc zosyn 5/22 s/p 5 days of zosyn    Nephro  -Pitting edema to both arms, ecchymosis on right AC, blisters on left arm around brachial area    -Was retaining urine, andrea was placed 5/5  -DC doxazosin for borderline BPs  Electrolytes replaced PRN    JARRED:  Resolved  Will start on mild IV hydration  ua shows proteinuria, triple phosphate crystals, large blood >50 rbc  FENA 6.6%, post obstructive  Monitor BMP    GI  Transaminitis:   likely secondary to Covid19, Resolved   hepatitis panel -ve  -continue to monitor LFT    Ileus  Abd xray with ileus, CT showing the same 5/15  Restart trickle feeds  G Tube off suction now  Rectal tube in place  C/w Miralax BID standing and reglan   G tube 4/23, - dressing changed daily for seroma  Patient was found to have ileus again on 5/20  Was started on lactulose suppository  slowly start on tube feeds  Cdiff negative  Start movantik    Vomiting  Patient had multiple episodes of vomiting for past 24 htrs  Was given zofran  Tube feeds were held, restarted 5/28  Xray abdomen showed distension, likely worsening of ileus  CT abdomen with IV contrast w/o acute findings  Surgery was informed      Heme  Elevated d-dimer: likely secondary to Covid19   -D-dimer 423 on admission  -Last D-dimer 1434  - No active bleeding, restarted lovenox daily    Anemia  S/p 4 PRBC total  - Now Hg stable 7-9  CTH and CT abdomen w/o contrast no evidence of bleed    No active signs of bleeding (No hematemesis, melena or hematuria)  Hemolysis w/u- negative  Iron studies show ACD  Dr Andrade on board   F/u CBC daily     Endocrine  Prediabetes:  -A1c 5.8  -BS controlled  -continue HSS    Abnormal TSH:  -TSH level noted 0.26,   -Repeat TSH 0.37 and Free T4 1.82    Skin/Catheters  No rashes. Peripheral IV lines.   Both arms with edema, right AC with ecchymosis  doppler of LUE was negative    Prophylaxis   On Lovenox for DVT   Protonix for GI proph    GOC  FULL CODE

## 2021-05-31 LAB
ALBUMIN SERPL ELPH-MCNC: 2.5 G/DL — LOW (ref 3.5–5)
ALP SERPL-CCNC: 64 U/L — SIGNIFICANT CHANGE UP (ref 40–120)
ALT FLD-CCNC: 28 U/L DA — SIGNIFICANT CHANGE UP (ref 10–60)
ANION GAP SERPL CALC-SCNC: 3 MMOL/L — LOW (ref 5–17)
AST SERPL-CCNC: 18 U/L — SIGNIFICANT CHANGE UP (ref 10–40)
BILIRUB SERPL-MCNC: 0.5 MG/DL — SIGNIFICANT CHANGE UP (ref 0.2–1.2)
BUN SERPL-MCNC: 13 MG/DL — SIGNIFICANT CHANGE UP (ref 7–18)
CALCIUM SERPL-MCNC: 8.4 MG/DL — SIGNIFICANT CHANGE UP (ref 8.4–10.5)
CHLORIDE SERPL-SCNC: 104 MMOL/L — SIGNIFICANT CHANGE UP (ref 96–108)
CO2 SERPL-SCNC: 36 MMOL/L — HIGH (ref 22–31)
CREAT SERPL-MCNC: 0.79 MG/DL — SIGNIFICANT CHANGE UP (ref 0.5–1.3)
GLUCOSE SERPL-MCNC: 87 MG/DL — SIGNIFICANT CHANGE UP (ref 70–99)
HCT VFR BLD CALC: 25.6 % — LOW (ref 39–50)
HGB BLD-MCNC: 8.1 G/DL — LOW (ref 13–17)
MAGNESIUM SERPL-MCNC: 1.6 MG/DL — SIGNIFICANT CHANGE UP (ref 1.6–2.6)
MCHC RBC-ENTMCNC: 31.6 GM/DL — LOW (ref 32–36)
MCHC RBC-ENTMCNC: 31.8 PG — SIGNIFICANT CHANGE UP (ref 27–34)
MCV RBC AUTO: 100.4 FL — HIGH (ref 80–100)
NRBC # BLD: 0 /100 WBCS — SIGNIFICANT CHANGE UP (ref 0–0)
PHOSPHATE SERPL-MCNC: 2.8 MG/DL — SIGNIFICANT CHANGE UP (ref 2.5–4.5)
PLATELET # BLD AUTO: 228 K/UL — SIGNIFICANT CHANGE UP (ref 150–400)
POTASSIUM SERPL-MCNC: 3.2 MMOL/L — LOW (ref 3.5–5.3)
POTASSIUM SERPL-SCNC: 3.2 MMOL/L — LOW (ref 3.5–5.3)
PROT SERPL-MCNC: 5.5 G/DL — LOW (ref 6–8.3)
RBC # BLD: 2.55 M/UL — LOW (ref 4.2–5.8)
RBC # FLD: 15.4 % — HIGH (ref 10.3–14.5)
SODIUM SERPL-SCNC: 143 MMOL/L — SIGNIFICANT CHANGE UP (ref 135–145)
WBC # BLD: 6.75 K/UL — SIGNIFICANT CHANGE UP (ref 3.8–10.5)
WBC # FLD AUTO: 6.75 K/UL — SIGNIFICANT CHANGE UP (ref 3.8–10.5)

## 2021-05-31 PROCEDURE — 74018 RADEX ABDOMEN 1 VIEW: CPT | Mod: 26

## 2021-05-31 PROCEDURE — 71045 X-RAY EXAM CHEST 1 VIEW: CPT | Mod: 26

## 2021-05-31 RX ORDER — SODIUM,POTASSIUM PHOSPHATES 278-250MG
1 POWDER IN PACKET (EA) ORAL
Refills: 0 | Status: COMPLETED | OUTPATIENT
Start: 2021-05-31 | End: 2021-05-31

## 2021-05-31 RX ORDER — POTASSIUM PHOSPHATE, MONOBASIC POTASSIUM PHOSPHATE, DIBASIC 236; 224 MG/ML; MG/ML
30 INJECTION, SOLUTION INTRAVENOUS ONCE
Refills: 0 | Status: DISCONTINUED | OUTPATIENT
Start: 2021-05-31 | End: 2021-05-31

## 2021-05-31 RX ORDER — CLONAZEPAM 1 MG
0.5 TABLET ORAL EVERY 8 HOURS
Refills: 0 | Status: DISCONTINUED | OUTPATIENT
Start: 2021-05-31 | End: 2021-06-02

## 2021-05-31 RX ORDER — POLYETHYLENE GLYCOL 3350 17 G/17G
17 POWDER, FOR SOLUTION ORAL DAILY
Refills: 0 | Status: DISCONTINUED | OUTPATIENT
Start: 2021-05-31 | End: 2021-06-11

## 2021-05-31 RX ORDER — METHADONE HYDROCHLORIDE 40 MG/1
5 TABLET ORAL DAILY
Refills: 0 | Status: DISCONTINUED | OUTPATIENT
Start: 2021-05-31 | End: 2021-06-07

## 2021-05-31 RX ORDER — SENNA PLUS 8.6 MG/1
2 TABLET ORAL AT BEDTIME
Refills: 0 | Status: DISCONTINUED | OUTPATIENT
Start: 2021-05-31 | End: 2021-06-11

## 2021-05-31 RX ORDER — SODIUM,POTASSIUM PHOSPHATES 278-250MG
1 POWDER IN PACKET (EA) ORAL
Refills: 0 | Status: DISCONTINUED | OUTPATIENT
Start: 2021-05-31 | End: 2021-05-31

## 2021-05-31 RX ORDER — POTASSIUM CHLORIDE 20 MEQ
40 PACKET (EA) ORAL
Refills: 0 | Status: COMPLETED | OUTPATIENT
Start: 2021-05-31 | End: 2021-05-31

## 2021-05-31 RX ADMIN — NALOXEGOL OXALATE 12.5 MILLIGRAM(S): 12.5 TABLET, FILM COATED ORAL at 11:17

## 2021-05-31 RX ADMIN — Medication 40 MILLIEQUIVALENT(S): at 07:59

## 2021-05-31 RX ADMIN — Medication 160 MILLIGRAM(S): at 06:31

## 2021-05-31 RX ADMIN — PANTOPRAZOLE SODIUM 40 MILLIGRAM(S): 20 TABLET, DELAYED RELEASE ORAL at 11:16

## 2021-05-31 RX ADMIN — ONDANSETRON 4 MILLIGRAM(S): 8 TABLET, FILM COATED ORAL at 23:11

## 2021-05-31 RX ADMIN — Medication 100 MILLIGRAM(S): at 06:34

## 2021-05-31 RX ADMIN — QUETIAPINE FUMARATE 50 MILLIGRAM(S): 200 TABLET, FILM COATED ORAL at 06:31

## 2021-05-31 RX ADMIN — Medication 1 PACKET(S): at 07:57

## 2021-05-31 RX ADMIN — ONDANSETRON 4 MILLIGRAM(S): 8 TABLET, FILM COATED ORAL at 11:17

## 2021-05-31 RX ADMIN — GABAPENTIN 100 MILLIGRAM(S): 400 CAPSULE ORAL at 21:04

## 2021-05-31 RX ADMIN — CHLORHEXIDINE GLUCONATE 1 APPLICATION(S): 213 SOLUTION TOPICAL at 06:17

## 2021-05-31 RX ADMIN — Medication 1000 MILLIGRAM(S): at 11:20

## 2021-05-31 RX ADMIN — SENNA PLUS 2 TABLET(S): 8.6 TABLET ORAL at 21:04

## 2021-05-31 RX ADMIN — Medication 100 MILLIGRAM(S): at 17:39

## 2021-05-31 RX ADMIN — Medication 1 PACKET(S): at 17:40

## 2021-05-31 RX ADMIN — Medication 1 MILLIGRAM(S): at 07:57

## 2021-05-31 RX ADMIN — Medication 40 MILLIEQUIVALENT(S): at 06:31

## 2021-05-31 RX ADMIN — Medication 20 MILLIGRAM(S): at 06:31

## 2021-05-31 RX ADMIN — ONDANSETRON 4 MILLIGRAM(S): 8 TABLET, FILM COATED ORAL at 17:40

## 2021-05-31 RX ADMIN — Medication 1 TABLET(S): at 11:17

## 2021-05-31 RX ADMIN — METHADONE HYDROCHLORIDE 5 MILLIGRAM(S): 40 TABLET ORAL at 21:04

## 2021-05-31 RX ADMIN — Medication 1 APPLICATION(S): at 17:39

## 2021-05-31 RX ADMIN — Medication 1000 UNIT(S): at 11:17

## 2021-05-31 RX ADMIN — GABAPENTIN 100 MILLIGRAM(S): 400 CAPSULE ORAL at 06:31

## 2021-05-31 RX ADMIN — ENOXAPARIN SODIUM 40 MILLIGRAM(S): 100 INJECTION SUBCUTANEOUS at 11:17

## 2021-05-31 RX ADMIN — Medication 1 PACKET(S): at 11:17

## 2021-05-31 RX ADMIN — QUETIAPINE FUMARATE 50 MILLIGRAM(S): 200 TABLET, FILM COATED ORAL at 17:41

## 2021-05-31 RX ADMIN — Medication 1 APPLICATION(S): at 06:31

## 2021-05-31 RX ADMIN — GABAPENTIN 100 MILLIGRAM(S): 400 CAPSULE ORAL at 14:20

## 2021-05-31 RX ADMIN — ONDANSETRON 4 MILLIGRAM(S): 8 TABLET, FILM COATED ORAL at 06:32

## 2021-05-31 RX ADMIN — Medication 0.5 MILLIGRAM(S): at 21:04

## 2021-05-31 RX ADMIN — Medication 1 PACKET(S): at 23:11

## 2021-05-31 RX ADMIN — METHADONE HYDROCHLORIDE 5 MILLIGRAM(S): 40 TABLET ORAL at 06:31

## 2021-05-31 RX ADMIN — Medication 0.5 MILLIGRAM(S): at 14:20

## 2021-05-31 NOTE — CHART NOTE - NSCHARTNOTEFT_GEN_A_CORE
Patient got transferred from ICU today around 3 pm.     HPI: 55M, no PMH, PSH appy and moody 1990s presented 3/14 with x9 days worsening cough, subjective fevers, and SOB, with x2-3 days dysuria and central, non-radiating, constant CP, admitted to [facility] for management of COVID PNA. Given discomfort of breathing, placed on 4L NC, with improvement. Labs notable for lymphopenia and neutrophilia despite WBC wnl, d-dimer 423, AST//114, lactate 3.3, and . Trop negative x1. +COVID. CXR notable for b/l infiltrates, L>R. Given x1 dose Tylenol and 1L IVF bolus in ED. Initially started on Remdesivir and Dexamethasone, Remdesivir was later discontinued secondary to positive antibodies and elevated LFT's. Not a  candidate for Tociluzimab due to elevated LFT. Patient continued to have respiratory distress requiring 15 L NRB support.    3/19/2021- Patient got transferred to ICU  due to increased work of breathing despite being on 15 L NRB . Patient was on HFNC to keep saturation >90%. Chest X ray showed Grossly stable mild left apical pneumothorax. Stable small pneumomediastinum. Soft tissue emphysema at the neck bases again noted. Stable patchy bilateral pulmonary infiltrates. Thoracic surgery consulted, no intervention at this time.  3/22/2021 - Chest X ray revealed trace ptx right and mild left pneumothorax  3/24/2021 - Overnight pt saturation remains in early 90s and late 80s . Pt remains on HFNC. CXR stable  3/25/2021- Chest X ray showed Overlying chin obscures lung apices. Questionable tiny left apical pneumothorax. Trial of semi- pulse steroid .. solumedrol 250 bid x 3 days   3/28- Started on solumedrol 40 mg bid  3/29/2021 - At 5.30 am, pt was in severe respiratory distress with tachypneic in 40s, tachycardic in 140s, saturating at high 70s, so decision was made to sedate and intubate the pt. Anesthesiologist intubated the patient. A central line, NGT  was placed  4/1/2021- Palliative care consulted. patient's brother/Idalgo ([phone]) has agreed to act as surrogate. Prednisone tapered to  once daily   4/6/2021- Left TLC placed, Hypertensive - s/p Lopressor 5 mg X 1  4/8/2021- Lasix daily and HCTz 50 mg added to aim for negative fluid balance, Left Chest tube placed, added Albumin (4/8-4/10)  4/11/2021-patient was having fever 100.8 rectal, s/p Tylenol , s/p 1 dose of vancomycin, on zosyn - New infiltrate on CXR ,likely developing VAP  4/13/2021- Continue to spike fevers, Zosyn switched to Meropenem. ID- Dr Anand following, new TLC placed  4/15/2021- Chest tuber discontinued   4/18/2021- BP running on lower side, Started on phenylephrine, Ibuprofen D/Connor , FiO2 increased to 70%Patient had large pneumothorax note don Left, thoracic surgery was consulted ,  left chest tube placed.  4/20/2021- Patient received 1 dose of Epifanio for vent asynchrony, A line placed, vent settings changed fro mPC to CVVC \par  4/21/2021- Trach placed  4/23/2021- PEG tube placed  4/25/2021 - cxr 4/25/21 with A 40% LEFT pneumothorax. LEFT multi-sidehole pigtail catheter overlies LEFT lower hemithorax.. Bilateral multifocal and diffuse ill-defined airspace opacities..  Follow-up AP portable chest radiograph 4/25/2021 AT 8:58 AM: Residual LEFT 30% upper zone pneumothorax.  4/26/2021- Patient noted to have hemoglobin drop to 6.8 from 7.5, will transfuse one unit of PRBC and monitor CBC.   4/27/2021- Patient's hemoglobin dropped again to 7, will give two unit of PRBC  4/28/2021- On 4/28 Hb 6.8, s/p 1 unit of PRBC hb 7.5 now. 4 PRBC total  CTH and CT abdomen w/o contrast no evidence of bleed  No active signs of bleeding (No hematemesis, melena or hematuria)  F/u hemolysis w/u- negative so far, elevated reticulocytes . Dr Andrade consulted  5/2/2021-Patients Hb remained stable, was tachypneic and breathing over the vent so 1 mg push of Dilaudid X 2 was given. V: 20/400/60%/3+ on precedex drip at 1.5.   5/3/2021- patient was found to have large gastric bubble, G tube was connected to wall suction, repeat CXR showed improvement.  5/5/2021 - Hemoglobin dropped from 8.8 to 7.4.Will add bactrim empirically as patient is on chronic prednisone for organizing pneumonia. Tube feeds were held because of diarrhea  5/6/2021- CT chest was done, which showed mild PTX. Left 3rd intercostal space Chest tube was placed. Patient was hypotensive overnight. Cristiano temperature. Was septic, will send cultures if spikes temperature again. Patient was retaining urine, andrea was placed overnight. Will start on albumin and give one dose of lasix as patient was hypotensive and edematous in upper extremities, also had low albumin. Patient was getting agitated, will give ketamine IV push and monitor if it helps.  5/10/2021- Pt tachycardic overnight and hypertensive to 228/116 w/p 1x lopressor. Stopped levophed. Pt became hypotensive. Then started phenylephrine. BP now stable. Will d/c methadone and seroquel and continue on precedex for sedation. CXR showing apical pneumothorax   5/13/2021- Pt tachycardic to 160-170 yesterday afternoon and night. Pupils dilated. Did not respond to increased doses of opiates and benzos. Developed fever to 102.3 overnight with episode of vomiting. UA was positive. started empirically on vanc/zosyn - will d/c for now as no clear source of infection. AM abdominal xray w/ likely ileus. Will restart Free water. Hold Lasix. CT abdomen done showing ileus and possible fecal matter in rectum. Fecal disimpaction performed, small ball of stool retrieved with liquid stool excreted subsequently. Spoke to Surgery RUBI Wood. Tube feeds stopped and PEG tube placed to suction with no output.. Also will restart Precedex as likely in withdrawal and clonid  5/18/2021- Patient was tachy and agitated. Later he had hypotension and bradycardia. He was given 1L bolus and was restarted on pheny after which he improved  5/25- Behavioral health consulted due to difficultyn weaning off sedation . Recommended C/w Klonopin to 1 mg q8h standing (no PRNs), with plan to further taper as tolerated. C/w Seroquel 50 mg BID standing  5/26/2021- BP was elevated, was started on labetalol. d/c andrea.  CT placed on water seal, c/w water seal  5/27/2021- Vomiting X2, held feeding, CT Abdomen/Pelvis ordered  5/28/2021- Placed on trach collar today  5/30/2021- Patient was on trach collar throughout the day yesterday and placed back on vent overnight. No other acute events overnight.    5/31/2021- Decreased Methadone to 5 mg daily and Klonopin to 0.5 mg every 8 hours. Plan was made to transfer patient to SCU      FOCUSED PE    GENERAL: In NAD, Pleasant, participating in exams  HEAD: Atraumatic, Normocephalic  EYES: PERRLA, conjunctiva and sclera clear  ENMT: No tonsillar erythema, exudates, or enlargement   NECK: Supple, normal appearance   NERVOUS SYSTEM: Alert & awake, On Trach collar, Follows commands ( show me two fingers, squeeze my hands, lift your right hand etc), denies pain, MARCELINO X 4 with good and equal strength  CHEST/LUNG: mild , Left Chest tube ( ACW) dressing intact ,Chest tube currently clamped, Crackles noted to auscultation, + Trach attached to vent  HEART: Regular rate and rhythm; No murmurs  ABDOMEN: Soft, Nontender, Nondistended; Bowel sounds present, + PEG tube  EXTREMITIES: No clubbing, cyanosis, or edema, + pp  LYMPH: No lymphadenopathy noted  SKIN: Noted open wound area to left hand - wound care following    PLAN  Plan as mentioned in Critical care notes from this am   Continue medical management , wound care as recommended  If patient in resp distress - plan to unclamp the chest tube  Will follow up on surgery recommendations  Continue Wound care recommendations, bowel regimen, Zofran PRN if vomiting  Will follow

## 2021-05-31 NOTE — PROGRESS NOTE ADULT - SUBJECTIVE AND OBJECTIVE BOX
Patient is a 55y old  Male who presents with a chief complaint of SOB (30 May 2021 11:33)    pt seen in icu [ x ], reg med floor [   ], bed [ x ], chair at bedside [   ], awake and responsive [ x ], mildly sedated, [  ],    nad [x  ]      Allergies    No Known Allergies        Vitals    T(F): 97.7 (05-31-21 @ 00:00), Max: 98 (05-30-21 @ 08:00)  HR: 72 (05-31-21 @ 04:00) (67 - 107)  BP: 104/62 (05-31-21 @ 04:00) (102/55 - 166/84)  RR: 20 (05-31-21 @ 04:00) (20 - 45)  SpO2: 100% (05-31-21 @ 04:00) (98% - 100%)  Wt(kg): --  CAPILLARY BLOOD GLUCOSE          Labs                          8.1    6.75  )-----------( 228      ( 31 May 2021 05:22 )             25.6       05-31    143  |  104  |  13  ----------------------------<  87  3.2<L>   |  36<H>  |  0.79    Ca    8.4      31 May 2021 05:22  Phos  2.8     05-31  Mg     1.6     05-31    TPro  5.5<L>  /  Alb  2.5<L>  /  TBili  0.5  /  DBili  x   /  AST  18  /  ALT  28  /  AlkPhos  64  05-31            .Sputum Sputum  04-16 @ 04:42   Moderate Enterobacter aerogenes (Carbapenem Resistant)  Normal Respiratory Monserrat present  --  Enterobacter aerogenes (Carbapenem Resistant)      .Urine Clean Catch (Midstream)  03-15 @ 00:52   No growth  --  --          Radiology Results      Meds    MEDICATIONS  (STANDING):  ascorbic acid 1000 milliGRAM(s) Oral daily  BACItracin   Ointment 1 Application(s) Topical two times a day  calcium carbonate 1250 mG  + Vitamin D (OsCal 500 + D) 1 Tablet(s) Oral daily  chlorhexidine 2% Cloths 1 Application(s) Topical <User Schedule>  cholecalciferol 1000 Unit(s) Oral daily  clonazePAM  Tablet 1 milliGRAM(s) Oral every 8 hours  enoxaparin Injectable 40 milliGRAM(s) SubCutaneous every 24 hours  gabapentin 100 milliGRAM(s) Oral every 8 hours  labetalol 100 milliGRAM(s) Enteral Tube two times a day  lactated ringers. 1000 milliLiter(s) (75 mL/Hr) IV Continuous <Continuous>  methadone    Tablet 5 milliGRAM(s) Oral every 12 hours  naloxegol 12.5 milliGRAM(s) Oral daily  ondansetron Injectable 4 milliGRAM(s) IV Push every 6 hours  pantoprazole  Injectable 40 milliGRAM(s) IV Push daily  potassium chloride   Powder 40 milliEquivalent(s) Oral every 2 hours  potassium phosphate / sodium phosphate Powder (PHOS-NaK) 1 Packet(s) Oral four times a day  predniSONE   Tablet 20 milliGRAM(s) Oral daily  QUEtiapine 50 milliGRAM(s) Oral two times a day  trimethoprim  40 mG/sulfamethoxazole 200 mG Suspension 160 milliGRAM(s) Oral <User Schedule>      MEDICATIONS  (PRN):  acetaminophen    Suspension .. 650 milliGRAM(s) Enteral Tube every 12 hours PRN Temp greater or equal to 38C (100.4F), Mild Pain (1 - 3)  ALBUTerol    90 MICROgram(s) HFA Inhaler 2 Puff(s) Inhalation every 6 hours PRN Shortness of Breath and/or Wheezing  artificial  tears Solution 1 Drop(s) Both EYES every 4 hours PRN Dry Eyes  bisacodyl Suppository 10 milliGRAM(s) Rectal daily PRN Constipation  ondansetron Injectable 4 milliGRAM(s) IV Push every 6 hours PRN Nausea and/or Vomiting  sodium chloride 0.9% lock flush 10 milliLiter(s) IV Push every 1 hour PRN Pre/post blood products, medications, blood draw, and to maintain line patency      Physical Exam    Neuro :  no focal deficits  Respiratory: CTA B/L  CV: RRR, S1S2, no murmurs,   Abdominal: Soft, NT, ND +BS, gastrostomy tube inplace  Extremities: edema of extrem, + peripheral pulses      ASSESSMENT      Hypoxemia 2nd to covid pna   transaminitis  prediabetes  h/o appendectomy  cholecystectomy        PLAN    cont cont precautions  covid 5/14/21 neg noted above  d/c remdesevir given covid ab positive noted   completed dexamethasone   started pulse steroids for 3 days - 250mg solumedrol bid now tapered off 4/14/21   C/w prednisone 20 daily    cont on bactrim empirically, TIW   cont asa, vit c,    cont albuterol inhaler   pulm f/u  procalcitonin, D-dimer, crp, ldh, ferritin, lactate noted ,    cont tylenol prn,   cont robitussin prn   pt off precedex    pt on methadone   pain mgmt eval noted   off phenylephrine drip for hypotension  clonidine held 2nd to low bp  s/p intubation 3/29/21   s/p tracheostomy 4/21/21  O2 sat  100% (98% - 100%) mv 40%  O2 via mech vent and taper fio2 as tolerated   vent mgmt as per icu  xray 3/19/21 with pneumomediastinum  rept cxr with New trace right apical pneumothorax. New mild left apical pneumothorax. Grossly stable small pneumomediastinum.  Soft tissue emphysema at the neck bases bilaterally. Grossly stable bilateral pulmonary infiltrates noted.   cxr 2/24 with No evidence of pneumothorax can be appreciated on the available image. This may be related to patient positioning. Evidence of pneumomediastinum and subcutaneous emphysema in the lower neck is again   noted. There are patchy bibasilar infiltrates and elevated right hemidiaphragm noted.   cxr 3/29/21 with No significant change bilateral infiltrates. There is a small simple left apical pneumothorax. No significant pleural effusion. Bilateral subcutaneous emphysema similar to prior.   XRs on 7AM and 5PM with no obvious increase of ptx  cxr 4/4/21 with Improving bilateral airspace disease noted    cxr 4/8/21 with Small left pneumothorax noted.  thoracic surg f/u   s/p L chest tube replacement 4/18/21 for recurrence of left pneumothorax   cxr 4/20/21 with Unchanged advanced infiltrates and catheter left chest tube noted   CXR 4/11/21 with : No interval change compared to one day prior. No pneumothorax noted.   unable to flush or aspirate tube fully, noted debris in tubing which was milked out.   Once material removed from tubing able to aspirated and flush fully. Also changed dressing on pigtail which appears to be in good position.   Monitor O2 status   s/p tracheostomy 4/21/21   cxr 4/21/21 with No evidence of active chest disease. Tracheostomy tube in place otherwise no significant change noted   cxr 4/25/21 with A 40% LEFT pneumothorax. LEFT multi-sidehole pigtail catheter overlies LEFT lower hemithorax.. Bilateral multifocal and diffuse ill-defined airspace opacities..  Follow-up AP portable chest radiograph 4/25/2021 AT 8:58 AM: Residual LEFT 30% upper zone pneumothorax. Otherwise no interval change.noted    cxr 4/30/21 Tracheostomy tube again noted. Left chest tube noted with small left apical pneumothorax unchanged. Bilateral airspace opacities overall worsening. Heart size cannot be accurately assessed in this projection noted.   cxr 5/3/21 with Large left pneumothorax noted above.   cxr 5 4/21 with No significant interval change noted above.   ct chest with Extensive chronic appearing consolidative opacities throughout the lungs, most prominent in the left lower lobe and lingula, with bronchiectasis, scarring, and volume loss. Additional scattered peripheral coarse opacities.   Mild left pneumothorax. Left lateral pleural space pigtail catheter, not in continuity with the pneumothorax. Mild bilateral hydronephrosis, similar to appearance on CT of the abdomen and pelvis on April 27. noted above   s/p 2nd chest tube 5/5/21  cxr 5/6/21 with Tracheostomy and catheter left chest tubes remain. , There are significant diffuse advanced infiltrates again noted. No pneumothorax. Above findings are similar to study earlier in the day. Present film shows a left jugular line inserted   with tip entering the superior vena cava noted   cxr 5/11/21 with Heart magnified by technique. Tracheostomy, left jugular line, and 2 catheter left chest tubes remain. Quite advanced infiltration in the lungs particularly in the left lower lobe again noted.  There is a persistent rather small left apical pneumothorax. Chest is similar to May 10 noted    cxt 5/14/21 with Perihilar diffuse airspace disease and LEFT lower lobe retrocardiac airspace consolidation. LEFT chest tube overlies upper zone. Small LEFT apical less than 5% pneumothorax noted   cxr 5/17/21 with Similar infiltrates. Small left pneumothorax similar to the prior study noted.   cxr 5/19/21 with Persistent mild to moderate left pneumothorax despite chest tube noted   cxr 5/20 with Improved left pneumothorax. Significant infiltration particularly in the left lower lobe again noted   cxr 5/21/21 with No appreciable pneumothorax at this time. Persistent advanced infiltrates noted    cxr 5/22/21 with  Left chest pigtail. Status post tracheostomy. Heart size unremarkable. No pneumothorax. Multiple patchy opacities throughout the left lung. Status post gastrostomy. No dilated loops of bowel noted above.   cxr 5/23/21 with Tracheostomy tube and within the left pigtail catheter are unchanged. Stable hazy opacities, left greater than the right. No pneumothorax noted above.   cxr 5/26/21 with Left lower lobe infiltrate lateral left lung infiltrate again seen. Rather small left apical pneumothorax again noted and small right upper lobe infiltrate again seen. Chest is similar to earlier in the day.noted above.  s/p surgical placement of gastrostomy tube 4/23/2021.   abd xray 5/10/21 with ileus noted   dressing changed daily for seroma   abd xray 5/21/21 with Decreased bowel ileus compared to prior 5/20/2021 exam noted above.   abd xray 5/22/21 with No dilated loops of bowel noted above.   cont tube feeding   CT scan of the chest 5/13/21 demonstrates diffuse bilateral infiltrates with a region of consolidation at the left lung base. Small left pneumothorax. 2 left chest tubes noted in place noted above.  CT scan of the abdomen and pelvis 5/13/21 demonstrates diffuse dilatation of small and large bowel loops most consistent with ileus noted   xray abd with  5/14/21 with Ingested contrast within nonobstructed large bowel to the level of rectum. No acute radiographic intra-abdominal findings noted   ct abd-pelv 5/27/21 with Delayed nephrographic phase enhancement of the nonobstructed bilateral kidneys, suggestive of acute renal insufficiency. No evidence of bowel obstruction. Proctitis noted above.   id f/u   patient completed course of Meropenem  leukocytosis resolved  sputum cx with Enterobacter aerogenes (Carbapenem Resistant) noted above   tmx 99.3     andrea changed  Completed  meropenem, s/p 1 dose of Vancomycin   completed zosyn  lispro ss   prognosis poor   s/p 4 units prbc for anemia  f/u h/h    transfuse prbc as needed  heme onc f/u  retic is elevated.    Haptoglobin normal  ? daily phlebotomy  no hemolysis, Fe/B12/folate adequate  hemolysis is unlikely even his baseline haptoglobin may be very elevated due to COVID  direct pamella neg noted    psych f/u  C/w Klonopin to 1 mg q8h standing (no PRNs), with plan to further taper as tolerated  cont Seroquel to 50 mg BID standing starting tonight  Continue delirium precautions: Frequent reorientation, familiar pictures and objects at bedside, natural light in daytime,   consistent sleep/wake schedule, adequate hydration and nutrition, sensory aids (hearing aids, glasses) present if needed, minimizing noise and overstimulation,   clustering care to minimize overnight interruptions, judicious use of deliriogenic medications (anticholinergics, benzodiazepines and opioid analgesics), minimize use of restraints  cont current meds   mgmt as per icu         Patient is a 55y old  Male who presents with a chief complaint of SOB (30 May 2021 11:33)    pt seen in icu [ x ], reg med floor [   ], bed [ x ], chair at bedside [   ], awake and responsive [ x ], mildly sedated, [  ],    nad [x  ]      Allergies    No Known Allergies        Vitals    T(F): 97.7 (05-31-21 @ 00:00), Max: 98 (05-30-21 @ 08:00)  HR: 72 (05-31-21 @ 04:00) (67 - 107)  BP: 104/62 (05-31-21 @ 04:00) (102/55 - 166/84)  RR: 20 (05-31-21 @ 04:00) (20 - 45)  SpO2: 100% (05-31-21 @ 04:00) (98% - 100%)  Wt(kg): --  CAPILLARY BLOOD GLUCOSE          Labs                          8.1    6.75  )-----------( 228      ( 31 May 2021 05:22 )             25.6       05-31    143  |  104  |  13  ----------------------------<  87  3.2<L>   |  36<H>  |  0.79    Ca    8.4      31 May 2021 05:22  Phos  2.8     05-31  Mg     1.6     05-31    TPro  5.5<L>  /  Alb  2.5<L>  /  TBili  0.5  /  DBili  x   /  AST  18  /  ALT  28  /  AlkPhos  64  05-31            .Sputum Sputum  04-16 @ 04:42   Moderate Enterobacter aerogenes (Carbapenem Resistant)  Normal Respiratory Monserrat present  --  Enterobacter aerogenes (Carbapenem Resistant)      .Urine Clean Catch (Midstream)  03-15 @ 00:52   No growth  --  --          Radiology Results    < from: Xray Chest 1 View- PORTABLE-Routine (Xray Chest 1 View- PORTABLE-Routine in AM.) (05.30.21 @ 08:56) >  IMPRESSION:  Tracheostomy cannula left chest catheter reidentified in position. No change small simple left apical pneumothorax. No change bilateral airspace opacities. Trace left pleural effusion suggested.    < end of copied text >        Meds    MEDICATIONS  (STANDING):  ascorbic acid 1000 milliGRAM(s) Oral daily  BACItracin   Ointment 1 Application(s) Topical two times a day  calcium carbonate 1250 mG  + Vitamin D (OsCal 500 + D) 1 Tablet(s) Oral daily  chlorhexidine 2% Cloths 1 Application(s) Topical <User Schedule>  cholecalciferol 1000 Unit(s) Oral daily  clonazePAM  Tablet 1 milliGRAM(s) Oral every 8 hours  enoxaparin Injectable 40 milliGRAM(s) SubCutaneous every 24 hours  gabapentin 100 milliGRAM(s) Oral every 8 hours  labetalol 100 milliGRAM(s) Enteral Tube two times a day  lactated ringers. 1000 milliLiter(s) (75 mL/Hr) IV Continuous <Continuous>  methadone    Tablet 5 milliGRAM(s) Oral every 12 hours  naloxegol 12.5 milliGRAM(s) Oral daily  ondansetron Injectable 4 milliGRAM(s) IV Push every 6 hours  pantoprazole  Injectable 40 milliGRAM(s) IV Push daily  potassium chloride   Powder 40 milliEquivalent(s) Oral every 2 hours  potassium phosphate / sodium phosphate Powder (PHOS-NaK) 1 Packet(s) Oral four times a day  predniSONE   Tablet 20 milliGRAM(s) Oral daily  QUEtiapine 50 milliGRAM(s) Oral two times a day  trimethoprim  40 mG/sulfamethoxazole 200 mG Suspension 160 milliGRAM(s) Oral <User Schedule>      MEDICATIONS  (PRN):  acetaminophen    Suspension .. 650 milliGRAM(s) Enteral Tube every 12 hours PRN Temp greater or equal to 38C (100.4F), Mild Pain (1 - 3)  ALBUTerol    90 MICROgram(s) HFA Inhaler 2 Puff(s) Inhalation every 6 hours PRN Shortness of Breath and/or Wheezing  artificial  tears Solution 1 Drop(s) Both EYES every 4 hours PRN Dry Eyes  bisacodyl Suppository 10 milliGRAM(s) Rectal daily PRN Constipation  ondansetron Injectable 4 milliGRAM(s) IV Push every 6 hours PRN Nausea and/or Vomiting  sodium chloride 0.9% lock flush 10 milliLiter(s) IV Push every 1 hour PRN Pre/post blood products, medications, blood draw, and to maintain line patency      Physical Exam    Neuro :  no focal deficits  Respiratory: CTA B/L  CV: RRR, S1S2, no murmurs,   Abdominal: Soft, NT, ND +BS, gastrostomy tube inplace  Extremities: edema of extrem, + peripheral pulses      ASSESSMENT      Hypoxemia 2nd to covid pna   transaminitis  prediabetes  h/o appendectomy  cholecystectomy        PLAN    cont cont precautions  covid 5/14/21 neg noted above  d/c remdesevir given covid ab positive noted   completed dexamethasone   started pulse steroids for 3 days - 250mg solumedrol bid now tapered off 4/14/21   C/w prednisone 20 daily    cont on bactrim empirically, TIW   cont asa, vit c,    cont albuterol inhaler   pulm f/u  procalcitonin, D-dimer, crp, ldh, ferritin, lactate noted ,    cont tylenol prn,   cont robitussin prn   pt off precedex    pt on methadone   pain mgmt eval noted   off phenylephrine drip for hypotension  clonidine held 2nd to low bp  s/p intubation 3/29/21   s/p tracheostomy 4/21/21  O2 sat  100% (98% - 100%) mv 40%  O2 via mech vent and taper fio2 as tolerated   vent mgmt as per icu  xray 3/19/21 with pneumomediastinum  rept cxr with New trace right apical pneumothorax. New mild left apical pneumothorax. Grossly stable small pneumomediastinum.  Soft tissue emphysema at the neck bases bilaterally. Grossly stable bilateral pulmonary infiltrates noted.   cxr 2/24 with No evidence of pneumothorax can be appreciated on the available image. This may be related to patient positioning. Evidence of pneumomediastinum and subcutaneous emphysema in the lower neck is again   noted. There are patchy bibasilar infiltrates and elevated right hemidiaphragm noted.   cxr 3/29/21 with No significant change bilateral infiltrates. There is a small simple left apical pneumothorax. No significant pleural effusion. Bilateral subcutaneous emphysema similar to prior.   XRs on 7AM and 5PM with no obvious increase of ptx  cxr 4/4/21 with Improving bilateral airspace disease noted    cxr 4/8/21 with Small left pneumothorax noted.  thoracic surg f/u   s/p L chest tube replacement 4/18/21 for recurrence of left pneumothorax   cxr 4/20/21 with Unchanged advanced infiltrates and catheter left chest tube noted   CXR 4/11/21 with : No interval change compared to one day prior. No pneumothorax noted.   unable to flush or aspirate tube fully, noted debris in tubing which was milked out.   Once material removed from tubing able to aspirated and flush fully. Also changed dressing on pigtail which appears to be in good position.   Monitor O2 status   s/p tracheostomy 4/21/21   cxr 4/21/21 with No evidence of active chest disease. Tracheostomy tube in place otherwise no significant change noted   cxr 4/25/21 with A 40% LEFT pneumothorax. LEFT multi-sidehole pigtail catheter overlies LEFT lower hemithorax.. Bilateral multifocal and diffuse ill-defined airspace opacities..  Follow-up AP portable chest radiograph 4/25/2021 AT 8:58 AM: Residual LEFT 30% upper zone pneumothorax. Otherwise no interval change.noted    cxr 4/30/21 Tracheostomy tube again noted. Left chest tube noted with small left apical pneumothorax unchanged. Bilateral airspace opacities overall worsening. Heart size cannot be accurately assessed in this projection noted.   cxr 5/3/21 with Large left pneumothorax noted above.   cxr 5 4/21 with No significant interval change noted above.   ct chest with Extensive chronic appearing consolidative opacities throughout the lungs, most prominent in the left lower lobe and lingula, with bronchiectasis, scarring, and volume loss. Additional scattered peripheral coarse opacities.   Mild left pneumothorax. Left lateral pleural space pigtail catheter, not in continuity with the pneumothorax. Mild bilateral hydronephrosis, similar to appearance on CT of the abdomen and pelvis on April 27. noted above   s/p 2nd chest tube 5/5/21  cxr 5/6/21 with Tracheostomy and catheter left chest tubes remain. , There are significant diffuse advanced infiltrates again noted. No pneumothorax. Above findings are similar to study earlier in the day. Present film shows a left jugular line inserted   with tip entering the superior vena cava noted   cxr 5/11/21 with Heart magnified by technique. Tracheostomy, left jugular line, and 2 catheter left chest tubes remain. Quite advanced infiltration in the lungs particularly in the left lower lobe again noted.  There is a persistent rather small left apical pneumothorax. Chest is similar to May 10 noted    cxt 5/14/21 with Perihilar diffuse airspace disease and LEFT lower lobe retrocardiac airspace consolidation. LEFT chest tube overlies upper zone. Small LEFT apical less than 5% pneumothorax noted   cxr 5/17/21 with Similar infiltrates. Small left pneumothorax similar to the prior study noted.   cxr 5/19/21 with Persistent mild to moderate left pneumothorax despite chest tube noted   cxr 5/20 with Improved left pneumothorax. Significant infiltration particularly in the left lower lobe again noted   cxr 5/21/21 with No appreciable pneumothorax at this time. Persistent advanced infiltrates noted    cxr 5/22/21 with  Left chest pigtail. Status post tracheostomy. Heart size unremarkable. No pneumothorax. Multiple patchy opacities throughout the left lung. Status post gastrostomy. No dilated loops of bowel noted   cxr 5/23/21 with Tracheostomy tube and within the left pigtail catheter are unchanged. Stable hazy opacities, left greater than the right. No pneumothorax noted    cxr 5/26/21 with Left lower lobe infiltrate lateral left lung infiltrate again seen. Rather small left apical pneumothorax again noted and small right upper lobe infiltrate again seen. Chest is similar to earlier in the day noted    cxr 5/30/21 with Tracheostomy cannula left chest catheter reidentified in position. No change small simple left apical pneumothorax. No change bilateral airspace opacities. Trace left pleural effusion suggested noted above.   s/p surgical placement of gastrostomy tube 4/23/2021.   abd xray 5/10/21 with ileus noted   dressing changed daily for seroma   abd xray 5/21/21 with Decreased bowel ileus compared to prior 5/20/2021 exam noted above.   abd xray 5/22/21 with No dilated loops of bowel noted above.   cont tube feeding   CT scan of the chest 5/13/21 demonstrates diffuse bilateral infiltrates with a region of consolidation at the left lung base. Small left pneumothorax. 2 left chest tubes noted in place noted above.  CT scan of the abdomen and pelvis 5/13/21 demonstrates diffuse dilatation of small and large bowel loops most consistent with ileus noted   xray abd with  5/14/21 with Ingested contrast within nonobstructed large bowel to the level of rectum. No acute radiographic intra-abdominal findings noted   ct abd-pelv 5/27/21 with Delayed nephrographic phase enhancement of the nonobstructed bilateral kidneys, suggestive of acute renal insufficiency. No evidence of bowel obstruction. Proctitis noted above.   id f/u   patient completed course of Meropenem  leukocytosis resolved  sputum cx with Enterobacter aerogenes (Carbapenem Resistant) noted above   afebrile  andrea changed  Completed  meropenem, s/p 1 dose of Vancomycin   completed zosyn  lispro ss   s/p 4 units prbc for anemia  f/u h/h    transfuse prbc as needed  heme onc f/u  retic is elevated.    Haptoglobin normal  ? daily phlebotomy  no hemolysis, Fe/B12/folate adequate  hemolysis is unlikely even his baseline haptoglobin may be very elevated due to COVID  direct pamella neg noted    psych f/u  C/w Klonopin to 1 mg q8h standing (no PRNs), with plan to further taper as tolerated  cont Seroquel to 50 mg BID standing starting tonight  Continue delirium precautions: Frequent reorientation, familiar pictures and objects at bedside, natural light in daytime,   consistent sleep/wake schedule, adequate hydration and nutrition, sensory aids (hearing aids, glasses) present if needed, minimizing noise and overstimulation,   clustering care to minimize overnight interruptions, judicious use of deliriogenic medications (anticholinergics, benzodiazepines and opioid analgesics), minimize use of restraints  cont current meds   mgmt as per icu

## 2021-05-31 NOTE — PROGRESS NOTE ADULT - SUBJECTIVE AND OBJECTIVE BOX
PGY-1 Progress Note discussed with attending    PAGER #: [[phone]] TILL 5:00 PM  PLEASE CONTACT ON CALL TEAM:  - On Call Team (Please refer to [place]) FROM 5:00 PM - 8:30PM  - Nightfloat Team FROM 8:30 -7:30 AM    INTERVAL HPI/OVERNIGHT EVENTS: No acute events overnight. Patient was on ventilation at night switched to trac C AM. Patient is AAOX3    MEDICATIONS:  acetaminophen    Suspension .. 650 milliGRAM(s) Enteral Tube every 12 hours PRN  ALBUTerol    90 MICROgram(s) HFA Inhaler 2 Puff(s) Inhalation every 6 hours PRN  artificial  tears Solution 1 Drop(s) Both EYES every 4 hours PRN  ascorbic acid 1000 milliGRAM(s) Oral daily  BACItracin   Ointment 1 Application(s) Topical two times a day  bisacodyl Suppository 10 milliGRAM(s) Rectal daily PRN  calcium carbonate 1250 mG  + Vitamin D (OsCal 500 + D) 1 Tablet(s) Oral daily  chlorhexidine 2% Cloths 1 Application(s) Topical <User Schedule>  cholecalciferol 1000 Unit(s) Oral daily  clonazePAM  Tablet 0.5 milliGRAM(s) Oral every 8 hours  enoxaparin Injectable 40 milliGRAM(s) SubCutaneous every 24 hours  gabapentin 100 milliGRAM(s) Oral every 8 hours  labetalol 100 milliGRAM(s) Enteral Tube two times a day  methadone    Tablet 5 milliGRAM(s) Oral every 12 hours  naloxegol 12.5 milliGRAM(s) Oral daily  ondansetron Injectable 4 milliGRAM(s) IV Push every 6 hours PRN  ondansetron Injectable 4 milliGRAM(s) IV Push every 6 hours  pantoprazole  Injectable 40 milliGRAM(s) IV Push daily  polyethylene glycol 3350 17 Gram(s) Oral daily PRN  potassium phosphate / sodium phosphate Powder (PHOS-NaK) 1 Packet(s) Oral four times a day  predniSONE   Tablet 20 milliGRAM(s) Oral daily  QUEtiapine 50 milliGRAM(s) Oral two times a day  senna 2 Tablet(s) Oral at bedtime  sodium chloride 0.9% lock flush 10 milliLiter(s) IV Push every 1 hour PRN  trimethoprim  40 mG/sulfamethoxazole 200 mG Suspension 160 milliGRAM(s) Oral <User Schedule>      REVIEW OF SYSTEMS:  CONSTITUTIONAL: No fever, weight loss, or fatigue  RESPIRATORY: No cough, wheezing, chills or hemoptysis; No shortness of breath  CARDIOVASCULAR: No chest pain, palpitations, dizziness, or leg swelling  GASTROINTESTINAL: No abdominal pain. No nausea, vomiting, or hematemesis; No diarrhea or constipation. No melena or hematochezia.  GENITOURINARY: No dysuria or hematuria, urinary frequency  NEUROLOGICAL: No headaches, memory loss, loss of strength, numbness, or tremors  SKIN: No itching, burning, rashes, or lesions     Vital Signs Last 24 Hrs  T(C): 37 (31 May 2021 07:00), Max: 37 (31 May 2021 07:00)  T(F): 98.6 (31 May 2021 07:00), Max: 98.6 (31 May 2021 07:00)  HR: 81 (31 May 2021 11:00) (67 - 107)  BP: 146/92 (31 May 2021 11:00) (102/55 - 166/84)  BP(mean): 103 (31 May 2021 11:00) (65 - 103)  RR: 31 (31 May 2021 11:00) (20 - 45)  SpO2: 97% (31 May 2021 11:00) (97% - 100%)    PHYSICAL EXAMINATION:  GENERAL: patient seen resting in bed and in no acute distress   HEAD: Atraumatic, Normocephalic  EYES: PERRLA, conjunctiva and sclera clear  ENMT: No tonsillar erythema, exudates, or enlargement   NECK: Supple, normal appearance   NERVOUS SYSTEM: Alert & Oriented, Good concentration   CHEST/LUNG: No chest deformity; crackles present to auscultation; left anterior chest tube in place    HEART: Regular rate and rhythm; No murmurs  ABDOMEN: Soft, Nontender, Nondistended; Bowel sounds present  EXTREMITIES: No clubbing, cyanosis, or edema  LYMPH: No lymphadenopathy noted  SKIN: No rashes or lesions; Good capillary refill                        8.1    6.75  )-----------( 228      ( 31 May 2021 05:22 )             25.6     05-31    143  |  104  |  13  ----------------------------<  87  3.2<L>   |  36<H>  |  0.79    Ca    8.4      31 May 2021 05:22  Phos  2.8     05-31  Mg     1.6     05-31    TPro  5.5<L>  /  Alb  2.5<L>  /  TBili  0.5  /  DBili  x   /  AST  18  /  ALT  28  /  AlkPhos  64  05-31    LIVER FUNCTIONS - ( 31 May 2021 05:22 )  Alb: 2.5 g/dL / Pro: 5.5 g/dL / ALK PHOS: 64 U/L / ALT: 28 U/L DA / AST: 18 U/L / GGT: x                   CAPILLARY BLOOD GLUCOSE      RADIOLOGY & ADDITIONAL TESTS:

## 2021-05-31 NOTE — PROGRESS NOTE ADULT - ATTENDING COMMENTS
55 yr old  man , non smoker with  moody 1990s presented 3/14 with x9 days worsening cough, subjective fevers, and SOB, with x2-3 days dysuria and central, non-radiating, constant CP. Admitted to medicine unit  for acute hypoxic respiratory failure secondary to pna from covid-19 infection .     Assessment:  1. Acute hypoxic respiratory failure  2. Covid-19 infection   3. Transaminitis  4. Prediabetes  5. Bilateral pneumothorax  6. Septic shock - resolved  7. Anemia  8. bowel obstruction/ileus    Plan   - No more episodes of vomiting. CT scan with out evidence of obstruction.   - low rate tube feedings to be continued  - Zofran for nausea control  -Chest tube to water seal   -Small PTX noted at left apex  -Thoracic surgery follow up requested regarding possible CT removal  -S/p tracheostomy placement 4/21   -S/p G-tube 4/23  -trach collar during day time as tolerated, full vent support overnight  -PT evaluation, upright positioning as tolerated  -Tapering dose of steroids as ordered, bactrim for PCP prophylaxis   -C-diff PCR negative  -Cont. movantik, lowered dose due to vomiting  -Taper Clonazepam and Methadone  -dvt/gi prophy  -hemodynamic monitoring   -Prognosis is guarded.

## 2021-05-31 NOTE — CHART NOTE - NSCHARTNOTEFT_GEN_A_CORE
56 y/o M with no PMH, PSH appy and moody 1990s presented on 3/14 with 9 days worsening cough, subjective fevers, and SOB, with x2-3 days dysuria and central, non-radiating, constant CP. Patient was admitted for Covid-19 pneumonia requiring supplemental oxygen.   He was started on Decadron, remdesevir was discontinued as antibodies were positive. Patient was transferred to ICU for worsening respiratory failure requiring HFNC.     Later patient had to be intubated, central line and A line were placed for pressor requirement. Patient developed Pneumomediastinum and pneumothorax post intubation, Thoracic surgery was consulted and they recommended only conservative management. The course of stay was complicated by Bacterial pneumonia and patient was started on Zosyn later changed to Meropenem. Patient Oxygen requirements increased.   Patient was diuresed with Lasix to help with fluid overload. Once he was net negative and Oxygen requirements started coming down, Lasix was discontinued and it was only given PRN.  Patient was taken to OR for trach and peg on 4/22. His O2 requirements are coming down post trach.   Patient had extreme difficulty being weaned off of sedation for SAT and SBT. Multiple antipsychotic, opiate, and benzo regimens were trailed.  Patient also developed left sided pneumothorax and chest tube was placed. Daily X-ray monitoring showed resolution of pneumo, Chest tube was subsequently removed. He later developed recurrent pneumo on 4/18 and chest tube was again placed to suction. L lung then developed apical pneumothorax for which an anterior chest tube was placed. CT of chest shows chest tubes in place but persistent pneumothorax.  Patient now off of all drips on oral medications only. Patient is stable to be downgraded to AI    Things to follow up:  f/u surgery rec's   Monitor Vitals   Continue on Bowel regimen   Zofran PRN for vomiting 56 y/o M with no PMH, PSH appy and moody 1990s presented on 3/14 with 9 days worsening cough, subjective fevers, and SOB, with x2-3 days dysuria and central, non-radiating, constant CP. Patient was admitted for Covid-19 pneumonia requiring supplemental oxygen.   He was started on Decadron, remdesevir was discontinued as antibodies were positive. Patient was transferred to ICU for worsening respiratory failure requiring HFNC.     Later patient had to be intubated, central line and A line were placed for pressor requirement. Patient developed Pneumomediastinum and pneumothorax post intubation, Thoracic surgery was consulted and they recommended only conservative management. The course of stay was complicated by Bacterial pneumonia and patient was started on Zosyn later changed to Meropenem. Patient Oxygen requirements increased.   Patient was diuresed with Lasix to help with fluid overload. Once he was net negative and Oxygen requirements started coming down, Lasix was discontinued and it was only given PRN.  Patient was taken to OR for trach and peg on 4/22. His O2 requirements are coming down post trach.   Patient had extreme difficulty being weaned off of sedation for SAT and SBT. Multiple antipsychotic, opiate, and benzo regimens were trailed.  Patient also developed left sided pneumothorax and chest tube was placed. Daily X-ray monitoring showed resolution of pneumo, Chest tube was subsequently removed. He later developed recurrent pneumo on 4/18 and chest tube was again placed to suction. L lung then developed apical pneumothorax for which an anterior chest tube was placed. CT of chest shows chest tubes in place but persistent pneumothorax.  Patient now off of all drips on oral medications only. Chest tube was clapped. Surgery will remove th tube probably today. Patient is stable to be downgraded to AI. Signout was given to NP and Attending, Dr. Martinez.    Things to follow up:  f/u surgery rec's  If patient has respiratory distress, Please Unclap the tube.   Monitor Vitals   Continue on Bowel regimen   Zofran PRN for vomiting 54 y/o M with no PMH, PSH appy and moody 1990s presented on 3/14 with 9 days worsening cough, subjective fevers, and SOB, with x2-3 days dysuria and central, non-radiating, constant CP. Patient was admitted for Covid-19 pneumonia requiring supplemental oxygen.   He was started on Decadron, remdesevir was discontinued as antibodies were positive. Patient was transferred to ICU for worsening respiratory failure requiring HFNC.     Later patient had to be intubated, central line and A line were placed for pressor requirement. Patient developed Pneumomediastinum and pneumothorax post intubation, Thoracic surgery was consulted and they recommended only conservative management. The course of stay was complicated by Bacterial pneumonia and patient was started on Zosyn later changed to Meropenem. Patient Oxygen requirements increased.   Patient was diuresed with Lasix to help with fluid overload. Once he was net negative and Oxygen requirements started coming down, Lasix was discontinued and it was only given PRN.  Patient was taken to OR for trach and peg on 4/22. His O2 requirements are coming down post trach.   Patient had extreme difficulty being weaned off of sedation for SAT and SBT. Multiple antipsychotic, opiate, and benzo regimens were trailed.  Patient also developed left sided pneumothorax and chest tube was placed. Daily X-ray monitoring showed resolution of pneumo, Chest tube was subsequently removed. He later developed recurrent pneumo on 4/18 and chest tube was again placed to suction. L lung then developed apical pneumothorax for which an anterior chest tube was placed. CT of chest shows chest tubes in place but persistent pneumothorax.  Patient now off of all drips on oral medications only. Chest tube was clapped. Surgery will remove th tube probably today. Patient is stable to be downgraded to AI. Signout was given to NP covering AI.    Things to follow up:  f/u surgery rec's  If patient has respiratory distress, Please Unclap the tube.   Monitor Vitals   Continue on Bowel regimen   Zofran PRN for vomiting 56 y/o M with no PMH, PSH appy and moody 1990s presented on 3/14 with 9 days worsening cough, subjective fevers, and SOB, with x2-3 days dysuria and central, non-radiating, constant CP. Patient was admitted for Covid-19 pneumonia requiring supplemental oxygen.   He was started on Decadron, remdesevir was discontinued as antibodies were positive. Patient was transferred to ICU for worsening respiratory failure requiring HFNC.     Later patient had to be intubated, central line and A line were placed for pressor requirement. Patient developed Pneumomediastinum and pneumothorax post intubation, Thoracic surgery was consulted and they recommended only conservative management. The course of stay was complicated by Bacterial pneumonia and patient was started on Zosyn later changed to Meropenem. Patient Oxygen requirements increased.   Patient was diuresed with Lasix to help with fluid overload. Once he was net negative and Oxygen requirements started coming down, Lasix was discontinued and it was only given PRN.  Patient was taken to OR for trach and peg on 4/22. His O2 requirements are coming down post trach.   Patient had extreme difficulty being weaned off of sedation for SAT and SBT. Multiple antipsychotic, opiate, and benzo regimens were trailed.  Patient also developed left sided pneumothorax and chest tube was placed. Daily X-ray monitoring showed resolution of pneumo, Chest tube was subsequently removed. He later developed recurrent pneumo on 4/18 and chest tube was again placed to suction. L lung then developed apical pneumothorax for which an anterior chest tube was placed. CT of chest shows chest tubes in place but persistent pneumothorax.  Patient now off of all drips on oral medications only. Chest tube was clamped. Surgery will remove th tube probably today. Patient is stable to be downgraded to AI. Signout was given to NP covering AI.    Things to follow up:  f/u surgery rec's  If patient has respiratory distress, Please Unclamp the tube.   Monitor Vitals   Continue on Bowel regimen   Zofran PRN for vomiting

## 2021-05-31 NOTE — PROGRESS NOTE ADULT - ASSESSMENT
Assessment and plan: 56 y/o M with no PMH is admitted to ICU for AHRF 2/2 covid19 pna     1. Acute hypoxic respiratory failure  2. ARDS 2/2 Covid pneumonia  3. Pneumothorax  4. Prediabetes  5. Abnormal TSH     Neuro  -Alert and oriented x 3 at baseline   -Off all drips, and calm  -C/w Klonopin to 1mg q8h  -C/w Seroquel 50 mg BID   -Decreased methadone to 5 BID  - d/c Ativan PRN, fentanyl PRN  -CT head unremarkable   -Low concern for malignant hypothermia, NMS, or serotonin syndrome given waxing/waning and lack of inciting medications    Cardiovascular  # Shock   now off pressors   Clonidine was d/c    Hypertension:  BP was found to be consistently elevated  Was started on labetalol 100mg bid     Pulm  #Acute hypoxic respiratory failure: secondary to Covid19 pneumonia  #ARDS  -Was on HFNC then got intubated 3/29  -Trach 4/22 continues on Vent   - Remdesivir was discontinued due to positive antibodies   - Finished Dexamethasone   - Covid19 PCR negative now, off isolation   - Daily SBT  Trach collar during day - tolerating well  Full vent support overnight    # Bacterial Pneumonia  - Stable infiltrate on CXR ,likely developed Bacterial pneumonia   - Completed ABx Zosyn, meropenem, s/p 1 dose of Vancomycin  - MRSA negative from 3/26, 5/18  - Sputum Cx growing few gram negative rods, CRE - Contact isolation  - Taper prednisone slowly for possible organizing pneumonia   - C/w bactrim empirically, TIW for PCP PPX  - Secretions from the trach, needs frequent suctions   - Pulm. Dr. Ryan  - ID Dr Anand   - Was found to have fevers 5/17  Was started on vanc and zosyn  MRSA is negative, will d/c vanc  zosyn now dc     #Pneumothorax and pneumomediastinum:   -Thoracic surgery following  -s/p Chest tube placed 4/8 pigtail to wall suction and d/c on 4/15  -On 4/18 chest tube replaced for recurrence of PTX- c/w chest tube to suction     -anterior chest tube placed 5/6 for worsening pneumothorax with resolution of PTX  5/10 CXR with recurrence of apical pneumo - tube adjusted with improvement however still persisting but stable  -Repeat Chest CT shows persistent PTX  -Water seal inferior CT -PTX was increased, readjusted tube as it was kinked.  - Lateral Ct was removed, repeat CXr showed developing of PTX in left lower lobe, resolving slowly  -Surgery was informed, following the case closely  - 5/23:  place CT to waterseal  5/24 reformation of PTX  5/24 CT to suction, CXR improvement  5/26 CT placed on water seal, c/w water seal  PTX stable - consider DC CT in AM if still stable    ID  Likely bacterial pneumonia   -leukocytosis improved 9k  -Febrile 100.4 5/18 - last episode  Was started on vanc and zosyn  MRSA is negative, will d/c vanc  dc zosyn 5/22 s/p 5 days of zosyn    Nephro  -Pitting edema to both arms, ecchymosis on right AC, blisters on left arm around brachial area    -Was retaining urine, andrea was placed 5/5  -DC doxazosin for borderline BPs  Electrolytes replaced PRN    JARRED:  Resolved  Will start on mild IV hydration  ua shows proteinuria, triple phosphate crystals, large blood >50 rbc  FENA 6.6%, post obstructive  Monitor BMP    GI  Transaminitis:   likely secondary to Covid19, Resolved   hepatitis panel -ve  -continue to monitor LFT    Ileus  Abd xray with ileus, CT showing the same 5/15  Restart trickle feeds  G Tube off suction now  Rectal tube in place  C/w Miralax BID standing and reglan   G tube 4/23, - dressing changed daily for seroma  Patient was found to have ileus again on 5/20  Was started on lactulose suppository  slowly start on tube feeds  Cdiff negative  Start movantik    Vomiting  Patient had multiple episodes of vomiting for past 24 htrs  Was given zofran  Tube feeds were held, restarted 5/28  Xray abdomen showed distension, likely worsening of ileus  CT abdomen with IV contrast w/o acute findings  Surgery was informed      Heme  Elevated d-dimer: likely secondary to Covid19   -D-dimer 423 on admission  -Last D-dimer 1434  - No active bleeding, restarted lovenox daily    Anemia  S/p 4 PRBC total  - Now Hg stable 7-9  CTH and CT abdomen w/o contrast no evidence of bleed    No active signs of bleeding (No hematemesis, melena or hematuria)  Hemolysis w/u- negative  Iron studies show ACD  Dr Andrade on board   F/u CBC daily     Endocrine  Prediabetes:  -A1c 5.8  -BS controlled  -continue HSS    Abnormal TSH:  -TSH level noted 0.26,   -Repeat TSH 0.37 and Free T4 1.82    Skin/Catheters  No rashes. Peripheral IV lines.   Both arms with edema, right AC with ecchymosis  doppler of LUE was negative    Prophylaxis   On Lovenox for DVT   Protonix for GI proph    GOC  FULL CODE Assessment and plan: 54 y/o M with no PMH is admitted to ICU for AHRF 2/2 covid19 pna     1. Acute hypoxic respiratory failure  2. ARDS 2/2 Covid pneumonia  3. Pneumothorax  4. Prediabetes  5. Abnormal TSH     Neuro  - Alert and oriented x 3 at baseline   - Off all drips, and calm  - Decreased Klonopin to 0.5 mg q8h  - C/w Seroquel 50 mg BID   - Decreased methadone to 5mg daily  - D/C Ativan PRN, fentanyl PRN  - CT head unremarkable   - Low concern for malignant hypothermia, NMS, or serotonin syndrome given waxing/waning and lack of inciting medications.    Cardiovascular  # Shock   - Now off pressors   - Clonidine was d/c    Hypertension:  BP was found to be consistently elevated  Was started on labetalol 100mg bid     Pulm  #Acute hypoxic respiratory failure: secondary to Covid19 pneumonia  #ARDS  - Was on HFNC then got intubated 3/29  - Trach 4/22 continues on Vent   - Remdesivir was discontinued due to positive antibodies   - Finished Dexamethasone   - Covid19 PCR negative now, off isolation   - Daily SBT  - Trach collar during day - tolerating well  - Full vent support overnight    # Bacterial Pneumonia  - Stable infiltrate on CXR ,likely developed Bacterial pneumonia   - Completed ABx Zosyn, meropenem, s/p 1 dose of Vancomycin  - MRSA negative from 3/26, 5/18  - Sputum Cx growing few gram negative rods, CRE - Contact isolation  - Taper prednisone slowly for possible organizing pneumonia   - C/w bactrim empirically, TIW for PCP PPX  - Secretions from the trach, needs frequent suctions   - Pulm. Dr. Ryan  - ID Dr Anand   - Was found to have fevers 5/17  - Was started on vanc and zosyn  - MRSA is negative, will d/c vanc  - zosyn now dc     #Pneumothorax and pneumomediastinum:   - Thoracic surgery following  - s/p Chest tube placed 4/8 pigtail to wall suction and d/c on 4/15  - On 4/18 chest tube replaced for recurrence of PTX- c/w chest tube to suction     - anterior chest tube placed 5/6 for worsening pneumothorax with resolution of PTX    5/10 CXR with recurrence of apical pneumo - tube adjusted with improvement however still persisting but stable  - Repeat Chest CT shows persistent PTX  - Water seal inferior CT -PTX was increased, readjusted tube as it was kinked.  - Lateral Ct was removed, repeat CXr showed developing of PTX in left lower lobe, resolving slowly  -Surgery was informed, following the case closely  - 5/23:  place CT to waterseal  5/24 reformation of PTX  5/24 CT to suction, CXR improvement  5/26 CT placed on water seal, c/w water seal  PTX stable - consider DC CT in AM if still stable    ID  Likely bacterial pneumonia   -leukocytosis improved 6k  -Febrile 100.4 5/18 - last episode  Was started on vanc and zosyn  MRSA is negative, will d/c vanc  dc zosyn 5/22 s/p 5 days of zosyn    Nephro  -Pitting edema to both arms, ecchymosis on right AC, blisters on left arm around brachial area    -Was retaining urine, andrea was placed 5/5  -DC doxazosin for borderline BPs  Electrolytes replaced PRN    JARRED:  Resolved  DC IV fluids   UA shows proteinuria, triple phosphate crystals, large blood >50 rbc  FENA 6.6%, post obstructive  Monitor BMP    GI  Transaminitis:  - likely secondary to Covid19, Resolved  - hepatitis panel -ve  - continue to monitor LFT    Ileus  Abd xray with ileus, CT showing the same 5/15  Restart trickle feeds  G Tube off suction now  Rectal tube in place  C/w Miralax BID standing and reglan   G tube 4/23, - dressing changed daily for seroma  Patient was found to have ileus again on 5/20  Was started on lactulose suppository  slowly start on tube feeds  Cdiff negative  On bowel regimen senna at bedtime     Vomiting  Patient had multiple episodes of vomiting for past 24 htrs  Was given zofran  Tube feeds were held, restarted 5/28  Xray abdomen showed distension, likely worsening of ileus  CT abdomen with IV contrast w/o acute findings  Surgery was informed      Heme  Elevated d-dimer: likely secondary to Covid19   -D-dimer 423 on admission  - Last D-dimer 1434  - No active bleeding, restarted Lovenox daily    Anemia  S/p 4 PRBC total  - Now Hg stable 7-9  CTH and CT abdomen w/o contrast no evidence of bleed    No active signs of bleeding (No hematemesis, melena or hematuria)  Hemolysis w/u- negative  Iron studies show ACD  Dr Andrade on board   F/u CBC daily     Endocrine  Prediabetes:  -A1c 5.8  -BS controlled  -continue HSS    Abnormal TSH:  -TSH level noted 0.26,   -Repeat TSH 0.37 and Free T4 1.82    Skin/Catheters  No rashes. Peripheral IV lines.   Both arms with edema, right AC with ecchymosis  doppler of LUE was negative    Prophylaxis   On Lovenox for DVT   Protonix for GI proph    GOC  FULL CODE

## 2021-05-31 NOTE — PROGRESS NOTE ADULT - SUBJECTIVE AND OBJECTIVE BOX
Patient is a 55y old  Male who presents with a chief complaint of SOB (31 May 2021 06:38)  Awake, alert, laying in bed on supp oxygen via trach. Off ventilator but tachypneic and diaphoretic    INTERVAL HPI/OVERNIGHT EVENTS:      VITAL SIGNS:  T(F): 98.6 (05-31-21 @ 07:00)  HR: 93 (05-31-21 @ 08:38)  BP: 113/59 (05-31-21 @ 08:00)  RR: 22 (05-31-21 @ 08:00)  SpO2: 100% (05-31-21 @ 08:38)  Wt(kg): --  I&O's Detail    30 May 2021 07:01  -  31 May 2021 07:00  --------------------------------------------------------  IN:    Enteral Tube Flush: 285 mL    IV PiggyBack: 500 mL    Lactated Ringers: 1650 mL    Vital1.5: 210 mL  Total IN: 2645 mL    OUT:    Chest Tube (mL): 0 mL    Voided (mL): 2075 mL  Total OUT: 2075 mL    Total NET: 570 mL        Mode: standby        REVIEW OF SYSTEMS:    CONSTITUTIONAL:  No fevers, chills, sweats    HEENT:  Eyes:  No diplopia or blurred vision. ENT:  No earache, sore throat or runny nose.    CARDIOVASCULAR:  No pressure, squeezing, tightness, or heaviness about the chest; no palpitations.    RESPIRATORY:  Per HPI    GASTROINTESTINAL:  No abdominal pain, nausea, vomiting or diarrhea.    GENITOURINARY:  No dysuria, frequency or urgency.    NEUROLOGIC:  No paresthesias, fasciculations, seizures or weakness.    PSYCHIATRIC:  No disorder of thought or mood.      PHYSICAL EXAM:    Constitutional: Well developed and nourished  Eyes:Perrla  ENMT: normal  Neck:supple  Respiratory: good air entry  Cardiovascular: S1 S2 regular  Gastrointestinal: Soft, Non tender  Extremities: No edema  Vascular:normal  Neurological:Awake, alert,Ox3  Musculoskeletal:Normal      MEDICATIONS  (STANDING):  ascorbic acid 1000 milliGRAM(s) Oral daily  BACItracin   Ointment 1 Application(s) Topical two times a day  calcium carbonate 1250 mG  + Vitamin D (OsCal 500 + D) 1 Tablet(s) Oral daily  chlorhexidine 2% Cloths 1 Application(s) Topical <User Schedule>  cholecalciferol 1000 Unit(s) Oral daily  clonazePAM  Tablet 0.5 milliGRAM(s) Oral every 8 hours  enoxaparin Injectable 40 milliGRAM(s) SubCutaneous every 24 hours  gabapentin 100 milliGRAM(s) Oral every 8 hours  labetalol 100 milliGRAM(s) Enteral Tube two times a day  methadone    Tablet 5 milliGRAM(s) Oral every 12 hours  naloxegol 12.5 milliGRAM(s) Oral daily  ondansetron Injectable 4 milliGRAM(s) IV Push every 6 hours  pantoprazole  Injectable 40 milliGRAM(s) IV Push daily  potassium phosphate / sodium phosphate Powder (PHOS-NaK) 1 Packet(s) Oral four times a day  predniSONE   Tablet 20 milliGRAM(s) Oral daily  QUEtiapine 50 milliGRAM(s) Oral two times a day  senna 2 Tablet(s) Oral at bedtime  trimethoprim  40 mG/sulfamethoxazole 200 mG Suspension 160 milliGRAM(s) Oral <User Schedule>    MEDICATIONS  (PRN):  acetaminophen    Suspension .. 650 milliGRAM(s) Enteral Tube every 12 hours PRN Temp greater or equal to 38C (100.4F), Mild Pain (1 - 3)  ALBUTerol    90 MICROgram(s) HFA Inhaler 2 Puff(s) Inhalation every 6 hours PRN Shortness of Breath and/or Wheezing  artificial  tears Solution 1 Drop(s) Both EYES every 4 hours PRN Dry Eyes  bisacodyl Suppository 10 milliGRAM(s) Rectal daily PRN Constipation  ondansetron Injectable 4 milliGRAM(s) IV Push every 6 hours PRN Nausea and/or Vomiting  polyethylene glycol 3350 17 Gram(s) Oral daily PRN Constipation  sodium chloride 0.9% lock flush 10 milliLiter(s) IV Push every 1 hour PRN Pre/post blood products, medications, blood draw, and to maintain line patency      Allergies    No Known Allergies    Intolerances        LABS:                        8.1    6.75  )-----------( 228      ( 31 May 2021 05:22 )             25.6     05-31    143  |  104  |  13  ----------------------------<  87  3.2<L>   |  36<H>  |  0.79    Ca    8.4      31 May 2021 05:22  Phos  2.8     05-31  Mg     1.6     05-31    TPro  5.5<L>  /  Alb  2.5<L>  /  TBili  0.5  /  DBili  x   /  AST  18  /  ALT  28  /  AlkPhos  64  05-31              CAPILLARY BLOOD GLUCOSE            RADIOLOGY & ADDITIONAL TESTS:    CXR:  < from: Xray Chest 1 View- PORTABLE-Routine (Xray Chest 1 View- PORTABLE-Routine in AM.) (05.30.21 @ 08:56) >  IMPRESSION:  Tracheostomy cannula left chest catheter reidentified in position. No change small simple left apical pneumothorax. No change bilateral airspace opacities. Trace left pleural effusion suggested.        < end of copied text >    Ct scan chest:    ekg;    echo:

## 2021-06-01 LAB
ALBUMIN SERPL ELPH-MCNC: 2.7 G/DL — LOW (ref 3.5–5)
ALP SERPL-CCNC: 72 U/L — SIGNIFICANT CHANGE UP (ref 40–120)
ALT FLD-CCNC: 32 U/L DA — SIGNIFICANT CHANGE UP (ref 10–60)
ANION GAP SERPL CALC-SCNC: 9 MMOL/L — SIGNIFICANT CHANGE UP (ref 5–17)
ANISOCYTOSIS BLD QL: SLIGHT — SIGNIFICANT CHANGE UP
AST SERPL-CCNC: 20 U/L — SIGNIFICANT CHANGE UP (ref 10–40)
BASOPHILS # BLD AUTO: 0.02 K/UL — SIGNIFICANT CHANGE UP (ref 0–0.2)
BASOPHILS NFR BLD AUTO: 0.3 % — SIGNIFICANT CHANGE UP (ref 0–2)
BILIRUB SERPL-MCNC: 0.6 MG/DL — SIGNIFICANT CHANGE UP (ref 0.2–1.2)
BUN SERPL-MCNC: 14 MG/DL — SIGNIFICANT CHANGE UP (ref 7–18)
CALCIUM SERPL-MCNC: 8.7 MG/DL — SIGNIFICANT CHANGE UP (ref 8.4–10.5)
CHLORIDE SERPL-SCNC: 102 MMOL/L — SIGNIFICANT CHANGE UP (ref 96–108)
CO2 SERPL-SCNC: 31 MMOL/L — SIGNIFICANT CHANGE UP (ref 22–31)
CREAT SERPL-MCNC: 0.88 MG/DL — SIGNIFICANT CHANGE UP (ref 0.5–1.3)
ELLIPTOCYTES BLD QL SMEAR: SLIGHT — SIGNIFICANT CHANGE UP
EOSINOPHIL # BLD AUTO: 0.14 K/UL — SIGNIFICANT CHANGE UP (ref 0–0.5)
EOSINOPHIL NFR BLD AUTO: 2 % — SIGNIFICANT CHANGE UP (ref 0–6)
GLUCOSE BLDC GLUCOMTR-MCNC: 100 MG/DL — HIGH (ref 70–99)
GLUCOSE BLDC GLUCOMTR-MCNC: 102 MG/DL — HIGH (ref 70–99)
GLUCOSE BLDC GLUCOMTR-MCNC: 108 MG/DL — HIGH (ref 70–99)
GLUCOSE SERPL-MCNC: 89 MG/DL — SIGNIFICANT CHANGE UP (ref 70–99)
HCT VFR BLD CALC: 25.4 % — LOW (ref 39–50)
HGB BLD-MCNC: 8.9 G/DL — LOW (ref 13–17)
IMM GRANULOCYTES NFR BLD AUTO: 0.4 % — SIGNIFICANT CHANGE UP (ref 0–1.5)
LYMPHOCYTES # BLD AUTO: 1.26 K/UL — SIGNIFICANT CHANGE UP (ref 1–3.3)
LYMPHOCYTES # BLD AUTO: 17.9 % — SIGNIFICANT CHANGE UP (ref 13–44)
MAGNESIUM SERPL-MCNC: 1.8 MG/DL — SIGNIFICANT CHANGE UP (ref 1.6–2.6)
MANUAL SMEAR VERIFICATION: SIGNIFICANT CHANGE UP
MCHC RBC-ENTMCNC: 35 GM/DL — SIGNIFICANT CHANGE UP (ref 32–36)
MCHC RBC-ENTMCNC: 37.6 PG — HIGH (ref 27–34)
MCV RBC AUTO: 107.2 FL — HIGH (ref 80–100)
MONOCYTES # BLD AUTO: 0.37 K/UL — SIGNIFICANT CHANGE UP (ref 0–0.9)
MONOCYTES NFR BLD AUTO: 5.3 % — SIGNIFICANT CHANGE UP (ref 2–14)
NEUTROPHILS # BLD AUTO: 5.22 K/UL — SIGNIFICANT CHANGE UP (ref 1.8–7.4)
NEUTROPHILS NFR BLD AUTO: 74.1 % — SIGNIFICANT CHANGE UP (ref 43–77)
NRBC # BLD: 0 /100 WBCS — SIGNIFICANT CHANGE UP (ref 0–0)
PHOSPHATE SERPL-MCNC: 3.3 MG/DL — SIGNIFICANT CHANGE UP (ref 2.5–4.5)
PLAT MORPH BLD: NORMAL — SIGNIFICANT CHANGE UP
PLATELET # BLD AUTO: 229 K/UL — SIGNIFICANT CHANGE UP (ref 150–400)
POIKILOCYTOSIS BLD QL AUTO: SLIGHT — SIGNIFICANT CHANGE UP
POTASSIUM SERPL-MCNC: 3.8 MMOL/L — SIGNIFICANT CHANGE UP (ref 3.5–5.3)
POTASSIUM SERPL-SCNC: 3.8 MMOL/L — SIGNIFICANT CHANGE UP (ref 3.5–5.3)
PROT SERPL-MCNC: 6.2 G/DL — SIGNIFICANT CHANGE UP (ref 6–8.3)
RBC # BLD: 2.37 M/UL — LOW (ref 4.2–5.8)
RBC # FLD: 17.4 % — HIGH (ref 10.3–14.5)
RBC BLD AUTO: ABNORMAL
SARS-COV-2 RNA SPEC QL NAA+PROBE: SIGNIFICANT CHANGE UP
SODIUM SERPL-SCNC: 142 MMOL/L — SIGNIFICANT CHANGE UP (ref 135–145)
WBC # BLD: 7.04 K/UL — SIGNIFICANT CHANGE UP (ref 3.8–10.5)
WBC # FLD AUTO: 7.04 K/UL — SIGNIFICANT CHANGE UP (ref 3.8–10.5)

## 2021-06-01 PROCEDURE — 71045 X-RAY EXAM CHEST 1 VIEW: CPT | Mod: 26

## 2021-06-01 PROCEDURE — 99233 SBSQ HOSP IP/OBS HIGH 50: CPT

## 2021-06-01 PROCEDURE — 71045 X-RAY EXAM CHEST 1 VIEW: CPT | Mod: 26,77

## 2021-06-01 RX ADMIN — GABAPENTIN 100 MILLIGRAM(S): 400 CAPSULE ORAL at 05:03

## 2021-06-01 RX ADMIN — ENOXAPARIN SODIUM 40 MILLIGRAM(S): 100 INJECTION SUBCUTANEOUS at 13:22

## 2021-06-01 RX ADMIN — Medication 0.5 MILLIGRAM(S): at 13:30

## 2021-06-01 RX ADMIN — GABAPENTIN 100 MILLIGRAM(S): 400 CAPSULE ORAL at 21:24

## 2021-06-01 RX ADMIN — QUETIAPINE FUMARATE 50 MILLIGRAM(S): 200 TABLET, FILM COATED ORAL at 05:03

## 2021-06-01 RX ADMIN — Medication 1 APPLICATION(S): at 18:32

## 2021-06-01 RX ADMIN — ONDANSETRON 4 MILLIGRAM(S): 8 TABLET, FILM COATED ORAL at 13:24

## 2021-06-01 RX ADMIN — NALOXEGOL OXALATE 12.5 MILLIGRAM(S): 12.5 TABLET, FILM COATED ORAL at 13:25

## 2021-06-01 RX ADMIN — QUETIAPINE FUMARATE 50 MILLIGRAM(S): 200 TABLET, FILM COATED ORAL at 18:32

## 2021-06-01 RX ADMIN — Medication 0.5 MILLIGRAM(S): at 05:04

## 2021-06-01 RX ADMIN — PANTOPRAZOLE SODIUM 40 MILLIGRAM(S): 20 TABLET, DELAYED RELEASE ORAL at 13:24

## 2021-06-01 RX ADMIN — Medication 20 MILLIGRAM(S): at 05:03

## 2021-06-01 RX ADMIN — ONDANSETRON 4 MILLIGRAM(S): 8 TABLET, FILM COATED ORAL at 05:03

## 2021-06-01 RX ADMIN — SENNA PLUS 2 TABLET(S): 8.6 TABLET ORAL at 21:24

## 2021-06-01 RX ADMIN — CHLORHEXIDINE GLUCONATE 1 APPLICATION(S): 213 SOLUTION TOPICAL at 05:03

## 2021-06-01 RX ADMIN — Medication 1 APPLICATION(S): at 05:03

## 2021-06-01 RX ADMIN — Medication 1 TABLET(S): at 13:23

## 2021-06-01 RX ADMIN — Medication 100 MILLIGRAM(S): at 05:04

## 2021-06-01 RX ADMIN — Medication 100 MILLIGRAM(S): at 18:32

## 2021-06-01 RX ADMIN — Medication 0.5 MILLIGRAM(S): at 21:24

## 2021-06-01 RX ADMIN — METHADONE HYDROCHLORIDE 5 MILLIGRAM(S): 40 TABLET ORAL at 13:30

## 2021-06-01 RX ADMIN — Medication 1000 UNIT(S): at 13:23

## 2021-06-01 RX ADMIN — GABAPENTIN 100 MILLIGRAM(S): 400 CAPSULE ORAL at 13:30

## 2021-06-01 RX ADMIN — Medication 1000 MILLIGRAM(S): at 13:22

## 2021-06-01 NOTE — BH CONSULTATION LIAISON PROGRESS NOTE - NSBHCONSULTRECOMMENDOTHER_PSY_A_CORE FT
1. C/w Klonopin at reduced dose of 0.5 mg q8h standing (no PRNs), Seroquel at same dose (50 mg BID)  2. Continue delirium precautions: Frequent reorientation, familiar pictures and objects at bedside, natural light in daytime, consistent sleep/wake schedule, adequate hydration and nutrition, sensory aids (hearing aids, glasses) present if needed, minimizing noise and overstimulation, clustering care to minimize overnight interruptions, judicious use of deliriogenic medications (anticholinergics, benzodiazepines and opioid analgesics), minimize use of restraints  --Highly recommend pt be provided with whiteboard and marker to facilitate communication with providers  3. Medical management as directed by primary team  4. Psychiatry will continue to follow PRN for delirium management  5. Case d/w ABIGAIL Whelan of primary team    Paloma Cali MD  Director, Consultation-Liaison Psychiatry Service  [id number]

## 2021-06-01 NOTE — PROGRESS NOTE ADULT - PROBLEM SELECTOR PLAN 1
isolation precautions DC  Cont  full mechanical ventilation support  Wean as tolerated  SBT daily as tolerated  Tracheal care  Decubitus prevention  Supplemental nutrition  Monitor oxygen sat  Vit C, D and zinc supp  Montelukast 10 mgs po Qhs  Daily CXR   G-tube with feeds  Replace lytes  Pulmonary toilet  DVT and GI PPX.

## 2021-06-01 NOTE — BH CONSULTATION LIAISON PROGRESS NOTE - NSBHASSESSMENTFT_PSY_ALL_CORE
55M from [facility], single and living alone working in a restaurant, with no reported PHx or MHx, BIB self on 3/14 c/o fever, cough and SOB and found to have COVID-19 PNA, transferred to ICU on 4/9 for AHRF, later developed bacterial PNA now s/p trach and PEG. Psych consulted for med management due to difficulty weaning off sedation. On initial exam, pt is highly somnolent and unable to participate, but hx and current presentation appear highly c/w acute multifactorial delirium i/s/o AHRF, BZD withdrawal and opioid withdrawal. On f/u today, pt presents more awake, alert and interactive, and communicates in writing for the first time, reporting that he feels short of breath and denying SI. Now that pt is again receiving feeds and medications via PEG, we recommend continuing gradual taper of Klonopin and Seroquel PO. Pt disposition will depend on clinical course, but there is currently no reason to believe that pt will require admission to IP psychiatry.

## 2021-06-01 NOTE — CHART NOTE - NSCHARTNOTEFT_GEN_A_CORE
L Pigtail clamped >24 hrs, morning CXR reviewed. Patient is s/p removal of L pigtail. Post pull CXR reviewed with attending. Patient tolerated the procedure well without complication. No acute thoracic surgical intervention at this time, reconsult as needed. Discussed with Dr. Lanier who agrees.

## 2021-06-01 NOTE — PROGRESS NOTE ADULT - SUBJECTIVE AND OBJECTIVE BOX
Patient is a 55y old  Male who presents with a chief complaint of SOB (31 May 2021 12:03)    pt seen in icu [ x ], reg med floor [   ], bed [ x ], chair at bedside [   ], awake and responsive [ x ], mildly sedated, [  ],    nad [x  ]      Allergies    No Known Allergies        Vitals    T(F): 99.1 (06-01-21 @ 05:00), Max: 99.1 (06-01-21 @ 05:00)  HR: 77 (06-01-21 @ 05:00) (66 - 100)  BP: 129/72 (06-01-21 @ 05:00) (113/59 - 160/88)  RR: 20 (06-01-21 @ 05:00) (20 - 33)  SpO2: 100% (06-01-21 @ 05:00) (97% - 100%)  Wt(kg): --  CAPILLARY BLOOD GLUCOSE          Labs                          8.1    6.75  )-----------( 228      ( 31 May 2021 05:22 )             25.6       05-31    143  |  104  |  13  ----------------------------<  87  3.2<L>   |  36<H>  |  0.79    Ca    8.4      31 May 2021 05:22  Phos  2.8     05-31  Mg     1.6     05-31    TPro  5.5<L>  /  Alb  2.5<L>  /  TBili  0.5  /  DBili  x   /  AST  18  /  ALT  28  /  AlkPhos  64  05-31            .Sputum Sputum  04-16 @ 04:42   Moderate Enterobacter aerogenes (Carbapenem Resistant)  Normal Respiratory Monserrat present  --  Enterobacter aerogenes (Carbapenem Resistant)      .Urine Clean Catch (Midstream)  03-15 @ 00:52   No growth  --  --          Radiology Results      Meds    MEDICATIONS  (STANDING):  ascorbic acid 1000 milliGRAM(s) Oral daily  BACItracin   Ointment 1 Application(s) Topical two times a day  calcium carbonate 1250 mG  + Vitamin D (OsCal 500 + D) 1 Tablet(s) Oral daily  chlorhexidine 2% Cloths 1 Application(s) Topical <User Schedule>  cholecalciferol 1000 Unit(s) Oral daily  clonazePAM  Tablet 0.5 milliGRAM(s) Oral every 8 hours  enoxaparin Injectable 40 milliGRAM(s) SubCutaneous every 24 hours  gabapentin 100 milliGRAM(s) Oral every 8 hours  labetalol 100 milliGRAM(s) Enteral Tube two times a day  methadone    Tablet 5 milliGRAM(s) Oral daily  naloxegol 12.5 milliGRAM(s) Oral daily  ondansetron Injectable 4 milliGRAM(s) IV Push every 6 hours  pantoprazole  Injectable 40 milliGRAM(s) IV Push daily  QUEtiapine 50 milliGRAM(s) Oral two times a day  senna 2 Tablet(s) Oral at bedtime  trimethoprim  40 mG/sulfamethoxazole 200 mG Suspension 160 milliGRAM(s) Oral <User Schedule>      MEDICATIONS  (PRN):  acetaminophen    Suspension .. 650 milliGRAM(s) Enteral Tube every 12 hours PRN Temp greater or equal to 38C (100.4F), Mild Pain (1 - 3)  ALBUTerol    90 MICROgram(s) HFA Inhaler 2 Puff(s) Inhalation every 6 hours PRN Shortness of Breath and/or Wheezing  artificial  tears Solution 1 Drop(s) Both EYES every 4 hours PRN Dry Eyes  bisacodyl Suppository 10 milliGRAM(s) Rectal daily PRN Constipation  ondansetron Injectable 4 milliGRAM(s) IV Push every 6 hours PRN Nausea and/or Vomiting  polyethylene glycol 3350 17 Gram(s) Oral daily PRN Constipation  sodium chloride 0.9% lock flush 10 milliLiter(s) IV Push every 1 hour PRN Pre/post blood products, medications, blood draw, and to maintain line patency      Physical Exam      Neuro :  no focal deficits  Respiratory: CTA B/L  CV: RRR, S1S2, no murmurs,   Abdominal: Soft, NT, ND +BS, gastrostomy tube inplace  Extremities: edema of extrem, + peripheral pulses      ASSESSMENT      Hypoxemia 2nd to covid pna   transaminitis  prediabetes  h/o appendectomy  cholecystectomy        PLAN    cont cont precautions  covid 5/14/21 neg noted above  d/c remdesevir given covid ab positive noted   completed dexamethasone   started pulse steroids for 3 days - 250mg solumedrol bid now tapered off 4/14/21   C/w prednisone 20 daily    cont on bactrim empirically, TIW   cont asa, vit c,    cont albuterol inhaler   pulm f/u  procalcitonin, D-dimer, crp, ldh, ferritin, lactate noted ,    cont tylenol prn,   cont robitussin prn   pt off precedex    pt on methadone   pain mgmt eval noted   off phenylephrine drip for hypotension  clonidine held 2nd to low bp  s/p intubation 3/29/21   s/p tracheostomy 4/21/21  O2 sat  100% (98% - 100%) mv 40%  O2 via mech vent and taper fio2 as tolerated   vent mgmt as per icu  xray 3/19/21 with pneumomediastinum  rept cxr with New trace right apical pneumothorax. New mild left apical pneumothorax. Grossly stable small pneumomediastinum.  Soft tissue emphysema at the neck bases bilaterally. Grossly stable bilateral pulmonary infiltrates noted.   cxr 2/24 with No evidence of pneumothorax can be appreciated on the available image. This may be related to patient positioning. Evidence of pneumomediastinum and subcutaneous emphysema in the lower neck is again   noted. There are patchy bibasilar infiltrates and elevated right hemidiaphragm noted.   cxr 3/29/21 with No significant change bilateral infiltrates. There is a small simple left apical pneumothorax. No significant pleural effusion. Bilateral subcutaneous emphysema similar to prior.   XRs on 7AM and 5PM with no obvious increase of ptx  cxr 4/4/21 with Improving bilateral airspace disease noted    cxr 4/8/21 with Small left pneumothorax noted.  thoracic surg f/u   s/p L chest tube replacement 4/18/21 for recurrence of left pneumothorax   cxr 4/20/21 with Unchanged advanced infiltrates and catheter left chest tube noted   CXR 4/11/21 with : No interval change compared to one day prior. No pneumothorax noted.   unable to flush or aspirate tube fully, noted debris in tubing which was milked out.   Once material removed from tubing able to aspirated and flush fully. Also changed dressing on pigtail which appears to be in good position.   Monitor O2 status   s/p tracheostomy 4/21/21   cxr 4/21/21 with No evidence of active chest disease. Tracheostomy tube in place otherwise no significant change noted   cxr 4/25/21 with A 40% LEFT pneumothorax. LEFT multi-sidehole pigtail catheter overlies LEFT lower hemithorax.. Bilateral multifocal and diffuse ill-defined airspace opacities..  Follow-up AP portable chest radiograph 4/25/2021 AT 8:58 AM: Residual LEFT 30% upper zone pneumothorax. Otherwise no interval change.noted    cxr 4/30/21 Tracheostomy tube again noted. Left chest tube noted with small left apical pneumothorax unchanged. Bilateral airspace opacities overall worsening. Heart size cannot be accurately assessed in this projection noted.   cxr 5/3/21 with Large left pneumothorax noted above.   cxr 5 4/21 with No significant interval change noted above.   ct chest with Extensive chronic appearing consolidative opacities throughout the lungs, most prominent in the left lower lobe and lingula, with bronchiectasis, scarring, and volume loss. Additional scattered peripheral coarse opacities.   Mild left pneumothorax. Left lateral pleural space pigtail catheter, not in continuity with the pneumothorax. Mild bilateral hydronephrosis, similar to appearance on CT of the abdomen and pelvis on April 27. noted above   s/p 2nd chest tube 5/5/21  cxr 5/6/21 with Tracheostomy and catheter left chest tubes remain. , There are significant diffuse advanced infiltrates again noted. No pneumothorax. Above findings are similar to study earlier in the day. Present film shows a left jugular line inserted   with tip entering the superior vena cava noted   cxr 5/11/21 with Heart magnified by technique. Tracheostomy, left jugular line, and 2 catheter left chest tubes remain. Quite advanced infiltration in the lungs particularly in the left lower lobe again noted.  There is a persistent rather small left apical pneumothorax. Chest is similar to May 10 noted    cxt 5/14/21 with Perihilar diffuse airspace disease and LEFT lower lobe retrocardiac airspace consolidation. LEFT chest tube overlies upper zone. Small LEFT apical less than 5% pneumothorax noted   cxr 5/17/21 with Similar infiltrates. Small left pneumothorax similar to the prior study noted.   cxr 5/19/21 with Persistent mild to moderate left pneumothorax despite chest tube noted   cxr 5/20 with Improved left pneumothorax. Significant infiltration particularly in the left lower lobe again noted   cxr 5/21/21 with No appreciable pneumothorax at this time. Persistent advanced infiltrates noted    cxr 5/22/21 with  Left chest pigtail. Status post tracheostomy. Heart size unremarkable. No pneumothorax. Multiple patchy opacities throughout the left lung. Status post gastrostomy. No dilated loops of bowel noted   cxr 5/23/21 with Tracheostomy tube and within the left pigtail catheter are unchanged. Stable hazy opacities, left greater than the right. No pneumothorax noted    cxr 5/26/21 with Left lower lobe infiltrate lateral left lung infiltrate again seen. Rather small left apical pneumothorax again noted and small right upper lobe infiltrate again seen. Chest is similar to earlier in the day noted    cxr 5/30/21 with Tracheostomy cannula left chest catheter reidentified in position. No change small simple left apical pneumothorax. No change bilateral airspace opacities. Trace left pleural effusion suggested noted above.   s/p surgical placement of gastrostomy tube 4/23/2021.   abd xray 5/10/21 with ileus noted   dressing changed daily for seroma   abd xray 5/21/21 with Decreased bowel ileus compared to prior 5/20/2021 exam noted above.   abd xray 5/22/21 with No dilated loops of bowel noted above.   cont tube feeding   CT scan of the chest 5/13/21 demonstrates diffuse bilateral infiltrates with a region of consolidation at the left lung base. Small left pneumothorax. 2 left chest tubes noted in place noted above.  CT scan of the abdomen and pelvis 5/13/21 demonstrates diffuse dilatation of small and large bowel loops most consistent with ileus noted   xray abd with  5/14/21 with Ingested contrast within nonobstructed large bowel to the level of rectum. No acute radiographic intra-abdominal findings noted   ct abd-pelv 5/27/21 with Delayed nephrographic phase enhancement of the nonobstructed bilateral kidneys, suggestive of acute renal insufficiency. No evidence of bowel obstruction. Proctitis noted above.   id f/u   patient completed course of Meropenem  leukocytosis resolved  sputum cx with Enterobacter aerogenes (Carbapenem Resistant) noted above   afebrile  andrea changed  Completed  meropenem, s/p 1 dose of Vancomycin   completed zosyn  lispro ss   s/p 4 units prbc for anemia  f/u h/h    transfuse prbc as needed  heme onc f/u  retic is elevated.    Haptoglobin normal  ? daily phlebotomy  no hemolysis, Fe/B12/folate adequate  hemolysis is unlikely even his baseline haptoglobin may be very elevated due to COVID  direct pamella neg noted    psych f/u  C/w Klonopin to 1 mg q8h standing (no PRNs), with plan to further taper as tolerated  cont Seroquel to 50 mg BID standing starting tonight  Continue delirium precautions: Frequent reorientation, familiar pictures and objects at bedside, natural light in daytime,   consistent sleep/wake schedule, adequate hydration and nutrition, sensory aids (hearing aids, glasses) present if needed, minimizing noise and overstimulation,   clustering care to minimize overnight interruptions, judicious use of deliriogenic medications (anticholinergics, benzodiazepines and opioid analgesics), minimize use of restraints  cont current meds   mgmt as per icu         Patient is a 55y old  Male who presents with a chief complaint of SOB (31 May 2021 12:03)    pt seen in icu [  ], reg med floor scu [ x  ], bed [ x ], chair at bedside [   ], awake and responsive [ x ], mildly sedated, [  ],    nad [x  ]      Allergies    No Known Allergies        Vitals    T(F): 99.1 (06-01-21 @ 05:00), Max: 99.1 (06-01-21 @ 05:00)  HR: 77 (06-01-21 @ 05:00) (66 - 100)  BP: 129/72 (06-01-21 @ 05:00) (113/59 - 160/88)  RR: 20 (06-01-21 @ 05:00) (20 - 33)  SpO2: 100% (06-01-21 @ 05:00) (97% - 100%)  Wt(kg): --  CAPILLARY BLOOD GLUCOSE          Labs                          8.1    6.75  )-----------( 228      ( 31 May 2021 05:22 )             25.6       05-31    143  |  104  |  13  ----------------------------<  87  3.2<L>   |  36<H>  |  0.79    Ca    8.4      31 May 2021 05:22  Phos  2.8     05-31  Mg     1.6     05-31    TPro  5.5<L>  /  Alb  2.5<L>  /  TBili  0.5  /  DBili  x   /  AST  18  /  ALT  28  /  AlkPhos  64  05-31            .Sputum Sputum  04-16 @ 04:42   Moderate Enterobacter aerogenes (Carbapenem Resistant)  Normal Respiratory Monserrat present  --  Enterobacter aerogenes (Carbapenem Resistant)      .Urine Clean Catch (Midstream)  03-15 @ 00:52   No growth  --  --          Radiology Results    < from: Xray Chest 1 View- PORTABLE-Routine (Xray Chest 1 View- PORTABLE-Routine in AM.) (05.31.21 @ 08:49) >  Findings/  Impression: Status post tracheostomy. Status post left chest pigtail catheter. Trace left pneumothorax is unchanged. Cardiomegaly. Moderate to severe multifocal patchy opacificationof both lungs, left greater than right, is unchanged.    Nonobstructive bowel gas pattern. No dilated loops of small or large bowel.    < end of copied text >      Meds    MEDICATIONS  (STANDING):  ascorbic acid 1000 milliGRAM(s) Oral daily  BACItracin   Ointment 1 Application(s) Topical two times a day  calcium carbonate 1250 mG  + Vitamin D (OsCal 500 + D) 1 Tablet(s) Oral daily  chlorhexidine 2% Cloths 1 Application(s) Topical <User Schedule>  cholecalciferol 1000 Unit(s) Oral daily  clonazePAM  Tablet 0.5 milliGRAM(s) Oral every 8 hours  enoxaparin Injectable 40 milliGRAM(s) SubCutaneous every 24 hours  gabapentin 100 milliGRAM(s) Oral every 8 hours  labetalol 100 milliGRAM(s) Enteral Tube two times a day  methadone    Tablet 5 milliGRAM(s) Oral daily  naloxegol 12.5 milliGRAM(s) Oral daily  ondansetron Injectable 4 milliGRAM(s) IV Push every 6 hours  pantoprazole  Injectable 40 milliGRAM(s) IV Push daily  QUEtiapine 50 milliGRAM(s) Oral two times a day  senna 2 Tablet(s) Oral at bedtime  trimethoprim  40 mG/sulfamethoxazole 200 mG Suspension 160 milliGRAM(s) Oral <User Schedule>      MEDICATIONS  (PRN):  acetaminophen    Suspension .. 650 milliGRAM(s) Enteral Tube every 12 hours PRN Temp greater or equal to 38C (100.4F), Mild Pain (1 - 3)  ALBUTerol    90 MICROgram(s) HFA Inhaler 2 Puff(s) Inhalation every 6 hours PRN Shortness of Breath and/or Wheezing  artificial  tears Solution 1 Drop(s) Both EYES every 4 hours PRN Dry Eyes  bisacodyl Suppository 10 milliGRAM(s) Rectal daily PRN Constipation  ondansetron Injectable 4 milliGRAM(s) IV Push every 6 hours PRN Nausea and/or Vomiting  polyethylene glycol 3350 17 Gram(s) Oral daily PRN Constipation  sodium chloride 0.9% lock flush 10 milliLiter(s) IV Push every 1 hour PRN Pre/post blood products, medications, blood draw, and to maintain line patency      Physical Exam      Neuro :  no focal deficits  Respiratory: CTA B/L  CV: RRR, S1S2, no murmurs,   Abdominal: Soft, NT, ND +BS, gastrostomy tube inplace  Extremities: edema of extrem, + peripheral pulses      ASSESSMENT      Hypoxemia 2nd to covid pna   transaminitis  prediabetes  h/o appendectomy  cholecystectomy        PLAN    cont cont precautions  covid 5/14/21 neg noted above  d/c remdesevir given covid ab positive noted   completed dexamethasone   started pulse steroids for 3 days - 250mg solumedrol bid now tapered off 4/14/21   prednisone 20 daily d/c 6/1/21   cont on bactrim empirically, TIW   cont asa, vit c,    cont albuterol inhaler   pulm f/u  procalcitonin, D-dimer, crp, ldh, ferritin, lactate noted ,    cont tylenol prn,   cont robitussin prn   pt off precedex    pt on methadone   pain mgmt eval noted   off phenylephrine drip for hypotension  clonidine held 2nd to low bp  s/p intubation 3/29/21   s/p tracheostomy 4/21/21  O2 sat 100% (97% - 100%) mv 40%  O2 via mech vent and wean as tolerated   vent mgmt as per scu  thoracic surg f/u   s/p L chest tube replacement 4/18/21 for recurrence of left pneumothorax   cxr 4/20/21 with Unchanged advanced infiltrates and catheter left chest tube noted   CXR 4/11/21 with : No interval change compared to one day prior. No pneumothorax noted.   unable to flush or aspirate tube fully, noted debris in tubing which was milked out.   Once material removed from tubing able to aspirated and flush fully. Also changed dressing on pigtail which appears to be in good position.   Monitor O2 status   s/p tracheostomy 4/21/21   cxr 4/21/21 with No evidence of active chest disease. Tracheostomy tube in place otherwise no significant change noted   cxr 4/25/21 with A 40% LEFT pneumothorax. LEFT multi-sidehole pigtail catheter overlies LEFT lower hemithorax.. Bilateral multifocal and diffuse ill-defined airspace opacities..  Follow-up AP portable chest radiograph 4/25/2021 AT 8:58 AM: Residual LEFT 30% upper zone pneumothorax. Otherwise no interval change.noted    s/p 2nd chest tube 5/5/21  cxr 5/6/21 with Tracheostomy and catheter left chest tubes remain. , There are significant diffuse advanced infiltrates again noted. No pneumothorax. Above findings are similar to study earlier in the day. Present film shows a left jugular line inserted   with tip entering the superior vena cava noted   cxr 5/30/21 with Tracheostomy cannula left chest catheter reidentified in position. No change small simple left apical pneumothorax. No change bilateral airspace opacities. Trace left pleural effusion suggested noted   cxr 5/31/21 with Status post tracheostomy. Status post left chest pigtail catheter. Trace left pneumothorax is unchanged. Cardiomegaly. Moderate to severe multifocal patchy opacificationof both lungs, left greater than right, is unchanged. Nonobstructive bowel gas pattern. No dilated loops of small or large bowel noted above.  s/p surgical placement of gastrostomy tube 4/23/2021.   abd xray 5/10/21 with ileus noted   dressing changed daily for seroma   abd xray 5/21/21 with Decreased bowel ileus compared to prior 5/20/2021 exam noted above.   abd xray 5/22/21 with No dilated loops of bowel noted above.   cont tube feeding   CT scan of the chest 5/13/21 demonstrates diffuse bilateral infiltrates with a region of consolidation at the left lung base. Small left pneumothorax. 2 left chest tubes noted in place noted above.  CT scan of the abdomen and pelvis 5/13/21 demonstrates diffuse dilatation of small and large bowel loops most consistent with ileus noted   xray abd with  5/14/21 with Ingested contrast within nonobstructed large bowel to the level of rectum. No acute radiographic intra-abdominal findings noted   ct abd-pelv 5/27/21 with Delayed nephrographic phase enhancement of the nonobstructed bilateral kidneys, suggestive of acute renal insufficiency. No evidence of bowel obstruction. Proctitis noted above.   id f/u   patient completed course of Meropenem  leukocytosis resolved  sputum cx with Enterobacter aerogenes (Carbapenem Resistant) noted above   tmx 99.1  Completed  meropenem, s/p 1 dose of Vancomycin   completed zosyn  lispro ss   s/p 4 units prbc for anemia  f/u h/h    transfuse prbc as needed  heme onc f/u  retic is elevated.    Haptoglobin normal  ? daily phlebotomy  no hemolysis, Fe/B12/folate adequate  hemolysis is unlikely even his baseline haptoglobin may be very elevated due to COVID  direct pamella neg noted    psych f/u/  C/w Klonopin to 1 mg q8h standing (no PRNs), with plan to further taper as tolerated  cont Seroquel to 50 mg BID standing starting tonight  Continue delirium precautions: Frequent reorientation, familiar pictures and objects at bedside, natural light in daytime,   consistent sleep/wake schedule, adequate hydration and nutrition, sensory aids (hearing aids, glasses) present if needed, minimizing noise and overstimulation,   clustering care to minimize overnight interruptions, judicious use of deliriogenic medications (anticholinergics, benzodiazepines and opioid analgesics), minimize use of restraints  cont current meds   mgmt as per scu

## 2021-06-01 NOTE — PROGRESS NOTE ADULT - SUBJECTIVE AND OBJECTIVE BOX
ARANZA CHAMBERS    SCU progress note    INTERVAL HPI/OVERNIGHT EVENTS: ***    DNR [ ]   DNI  [  ]    Covid - 19 PCR:     The 4Ms    What Matters Most: see GOC  Age appropriate Medications/Screen for High Risk Medication: Yes  Mentation: see CAM below  Mobility: defer to physical exam    The Confusion Assessment Method (CAM) Diagnostic Algorithm     1: Acute Onset or Fluctuating Course  - Is there evidence of an acute change in mental status from the patient’s baseline? Did the (abnormal) behavior  fluctuate during the day, that is, tend to come and go, or increase and decrease in severity?  [ ] YES [ ] NO     2: Inattention  - Did the patient have difficulty focusing attention, being easily distractible, or having difficulty keeping track of what was being said?  [ ] YES [ ] NO     3: Disorganized thinking  -Was the patient’s thinking disorganized or incoherent, such as rambling or irrelevant conversation, unclear or illogical flow of ideas, or unpredictable switching from subject to subject?  [ ] YES [ ] NO    4: Altered Level of consciousness?  [ ] YES [ ] NO    The diagnosis of delirium by CAM requires the presence of features 1 and 2 and either 3 or 4.    PRESSORS: [ ] YES [ ] NO  trimethoprim  40 mG/sulfamethoxazole 200 mG Suspension 160 milliGRAM(s) Oral <User Schedule>    Cardiovascular:  Heart Failure  Acute   Acute on Chronic  Chronic       labetalol 100 milliGRAM(s) Enteral Tube two times a day    Pulmonary:  ALBUTerol    90 MICROgram(s) HFA Inhaler 2 Puff(s) Inhalation every 6 hours PRN    Hematalogic:  enoxaparin Injectable 40 milliGRAM(s) SubCutaneous every 24 hours    Other:  acetaminophen    Suspension .. 650 milliGRAM(s) Enteral Tube every 12 hours PRN  artificial  tears Solution 1 Drop(s) Both EYES every 4 hours PRN  ascorbic acid 1000 milliGRAM(s) Oral daily  BACItracin   Ointment 1 Application(s) Topical two times a day  bisacodyl Suppository 10 milliGRAM(s) Rectal daily PRN  calcium carbonate 1250 mG  + Vitamin D (OsCal 500 + D) 1 Tablet(s) Oral daily  chlorhexidine 2% Cloths 1 Application(s) Topical <User Schedule>  cholecalciferol 1000 Unit(s) Oral daily  clonazePAM  Tablet 0.5 milliGRAM(s) Oral every 8 hours  gabapentin 100 milliGRAM(s) Oral every 8 hours  methadone    Tablet 5 milliGRAM(s) Oral daily  naloxegol 12.5 milliGRAM(s) Oral daily  ondansetron Injectable 4 milliGRAM(s) IV Push every 6 hours  ondansetron Injectable 4 milliGRAM(s) IV Push every 6 hours PRN  pantoprazole  Injectable 40 milliGRAM(s) IV Push daily  polyethylene glycol 3350 17 Gram(s) Oral daily PRN  QUEtiapine 50 milliGRAM(s) Oral two times a day  senna 2 Tablet(s) Oral at bedtime  sodium chloride 0.9% lock flush 10 milliLiter(s) IV Push every 1 hour PRN    acetaminophen    Suspension .. 650 milliGRAM(s) Enteral Tube every 12 hours PRN  ALBUTerol    90 MICROgram(s) HFA Inhaler 2 Puff(s) Inhalation every 6 hours PRN  artificial  tears Solution 1 Drop(s) Both EYES every 4 hours PRN  ascorbic acid 1000 milliGRAM(s) Oral daily  BACItracin   Ointment 1 Application(s) Topical two times a day  bisacodyl Suppository 10 milliGRAM(s) Rectal daily PRN  calcium carbonate 1250 mG  + Vitamin D (OsCal 500 + D) 1 Tablet(s) Oral daily  chlorhexidine 2% Cloths 1 Application(s) Topical <User Schedule>  cholecalciferol 1000 Unit(s) Oral daily  clonazePAM  Tablet 0.5 milliGRAM(s) Oral every 8 hours  enoxaparin Injectable 40 milliGRAM(s) SubCutaneous every 24 hours  gabapentin 100 milliGRAM(s) Oral every 8 hours  labetalol 100 milliGRAM(s) Enteral Tube two times a day  methadone    Tablet 5 milliGRAM(s) Oral daily  naloxegol 12.5 milliGRAM(s) Oral daily  ondansetron Injectable 4 milliGRAM(s) IV Push every 6 hours  ondansetron Injectable 4 milliGRAM(s) IV Push every 6 hours PRN  pantoprazole  Injectable 40 milliGRAM(s) IV Push daily  polyethylene glycol 3350 17 Gram(s) Oral daily PRN  QUEtiapine 50 milliGRAM(s) Oral two times a day  senna 2 Tablet(s) Oral at bedtime  sodium chloride 0.9% lock flush 10 milliLiter(s) IV Push every 1 hour PRN  trimethoprim  40 mG/sulfamethoxazole 200 mG Suspension 160 milliGRAM(s) Oral <User Schedule>    Drug Dosing Weight  Height (cm): 167.6 (23 Apr 2021 23:15)  Weight (kg): 83.9 (23 Apr 2021 23:15)  BMI (kg/m2): 29.9 (23 Apr 2021 23:15)  BSA (m2): 1.93 (23 Apr 2021 23:15)    CENTRAL LINE: [ ] YES [ ] NO  LOCATION:   DATE INSERTED:  REMOVE: [ ] YES [ ] NO  EXPLAIN:    RIVERA: [ ] YES [ ] NO    DATE INSERTED:  REMOVE:  [ ] YES [ ] NO  EXPLAIN:    PAST MEDICAL & SURGICAL HISTORY:  No pertinent past medical history    S/P moody  1990s    S/P appendectomy  1990s 05-31 @ 07:01  -  06-01 @ 07:00  --------------------------------------------------------  IN: 435 mL / OUT: 1675 mL / NET: -1240 mL        Mode: AC/ CMV (Assist Control/ Continuous Mandatory Ventilation)  RR (machine): 22  TV (machine): 450  FiO2: 40  PEEP: 5  ITime: 0.7  MAP: 11  PIP: 35      PHYSICAL EXAM:    GENERAL:anxious and  tachypneic in 30's while on TC , Now improved after returned to AC.   HEAD:  Atraumatic, Normocephalic  EYES: EOMI, PERRLA, conjunctiva and sclera clear  ENMT: No tonsillar erythema, exudates, or enlargement; Moist mucous membranes, Good dentition, No lesions  NECK: Trach intact, Supple, No JVD,  NERVOUS SYSTEM: Awake/ Alert &Oriented X2-3, Motor Strength 5/5 B/L upper and 4/5 lower extremities; CHEST/LUNG:Trach to vent. Diminished. ; No rales, rhonchi, wheezing, or rubs  HEART: Regular rate and rhythm; No murmurs, rubs, or gallops  ABDOMEN: Soft, Nontender, Nondistended; Bowel sounds present. PEG tube intact.   EXTREMITIES:  2+ Peripheral Pulses, No clubbing, cyanosis, or edema  LYMPH: No lymphadenopathy noted  SKIN:      LABS:  CBC Full  -  ( 01 Jun 2021 07:54 )  WBC Count : 7.04 K/uL  RBC Count : 2.37 M/uL  Hemoglobin : 8.9 g/dL  Hematocrit : 25.4 %  Platelet Count - Automated : 229 K/uL  Mean Cell Volume : 107.2 fl  Mean Cell Hemoglobin : 37.6 pg  Mean Cell Hemoglobin Concentration : 35.0 gm/dL  Auto Neutrophil # : x  Auto Lymphocyte # : x  Auto Monocyte # : x  Auto Eosinophil # : x  Auto Basophil # : x  Auto Neutrophil % : x  Auto Lymphocyte % : x  Auto Monocyte % : x  Auto Eosinophil % : x  Auto Basophil % : x    06-01    142  |  102  |  14  ----------------------------<  89  3.8   |  31  |  0.88    Ca    8.7      01 Jun 2021 07:54  Phos  3.3     06-01  Mg     1.8     06-01    TPro  6.2  /  Alb  2.7<L>  /  TBili  0.6  /  DBili  x   /  AST  20  /  ALT  32  /  AlkPhos  72  06-01              [  ]  DVT Prophylaxis  [  ]          Abnormal Nutritional Status -  Malnutrition   Cachexia            RADIOLOGY & ADDITIONAL STUDIES:  ***    Goals of Care Discussion with Family/Proxy/Other   - see note from     ARANZA CHAMBERS    SCU progress note    INTERVAL HPI/OVERNIGHT EVENTS: **ICU downgrade yesterday. No acute events overnight. Pt seen and examined this morning. Pt awake, trach to vent, nods yes when asked if feeling okay. Per staff, pt reported abdominal discomfort this morning. No PEG residual noted. Pt had BM yesterday.       DNR [ ]   DNI  [  ] Full code    Covid - 19 PCR: negative 5/20    The 4Ms    What Matters Most: see GOC  Age appropriate Medications/Screen for High Risk Medication: Yes  Mentation: see CAM below  Mobility: defer to physical exam    The Confusion Assessment Method (CAM) Diagnostic Algorithm     1: Acute Onset or Fluctuating Course  - Is there evidence of an acute change in mental status from the patient’s baseline? Did the (abnormal) behavior  fluctuate during the day, that is, tend to come and go, or increase and decrease in severity?  [ ] YES [x ] NO     2: Inattention  - Did the patient have difficulty focusing attention, being easily distractible, or having difficulty keeping track of what was being said?  [ ] YES [ x] NO     3: Disorganized thinking  -Was the patient’s thinking disorganized or incoherent, such as rambling or irrelevant conversation, unclear or illogical flow of ideas, or unpredictable switching from subject to subject?  [ ] YES [x ] NO    4: Altered Level of consciousness?  [ ] YES [x ] NO    The diagnosis of delirium by CAM requires the presence of features 1 and 2 and either 3 or 4.    PRESSORS: [ ] YES [x ] NO  trimethoprim  40 mG/sulfamethoxazole 200 mG Suspension 160 milliGRAM(s) Oral <User Schedule>    Cardiovascular:  Echo 5/8/21: EF > 55%, grossly normal left ventricular systolic function.   Right ventricle not well visualized.  Trivial pericardial effusion.    labetalol 100 milliGRAM(s) Enteral Tube two times a day    Pulmonary:  ALBUTerol    90 MICROgram(s) HFA Inhaler 2 Puff(s) Inhalation every 6 hours PRN    Hematalogic:  enoxaparin Injectable 40 milliGRAM(s) SubCutaneous every 24 hours    Other:  acetaminophen    Suspension .. 650 milliGRAM(s) Enteral Tube every 12 hours PRN  artificial  tears Solution 1 Drop(s) Both EYES every 4 hours PRN  ascorbic acid 1000 milliGRAM(s) Oral daily  BACItracin   Ointment 1 Application(s) Topical two times a day  bisacodyl Suppository 10 milliGRAM(s) Rectal daily PRN  calcium carbonate 1250 mG  + Vitamin D (OsCal 500 + D) 1 Tablet(s) Oral daily  chlorhexidine 2% Cloths 1 Application(s) Topical <User Schedule>  cholecalciferol 1000 Unit(s) Oral daily  clonazePAM  Tablet 0.5 milliGRAM(s) Oral every 8 hours  gabapentin 100 milliGRAM(s) Oral every 8 hours  methadone    Tablet 5 milliGRAM(s) Oral daily  naloxegol 12.5 milliGRAM(s) Oral daily  ondansetron Injectable 4 milliGRAM(s) IV Push every 6 hours  ondansetron Injectable 4 milliGRAM(s) IV Push every 6 hours PRN  pantoprazole  Injectable 40 milliGRAM(s) IV Push daily  polyethylene glycol 3350 17 Gram(s) Oral daily PRN  QUEtiapine 50 milliGRAM(s) Oral two times a day  senna 2 Tablet(s) Oral at bedtime  sodium chloride 0.9% lock flush 10 milliLiter(s) IV Push every 1 hour PRN    acetaminophen    Suspension .. 650 milliGRAM(s) Enteral Tube every 12 hours PRN  ALBUTerol    90 MICROgram(s) HFA Inhaler 2 Puff(s) Inhalation every 6 hours PRN  artificial  tears Solution 1 Drop(s) Both EYES every 4 hours PRN  ascorbic acid 1000 milliGRAM(s) Oral daily  BACItracin   Ointment 1 Application(s) Topical two times a day  bisacodyl Suppository 10 milliGRAM(s) Rectal daily PRN  calcium carbonate 1250 mG  + Vitamin D (OsCal 500 + D) 1 Tablet(s) Oral daily  chlorhexidine 2% Cloths 1 Application(s) Topical <User Schedule>  cholecalciferol 1000 Unit(s) Oral daily  clonazePAM  Tablet 0.5 milliGRAM(s) Oral every 8 hours  enoxaparin Injectable 40 milliGRAM(s) SubCutaneous every 24 hours  gabapentin 100 milliGRAM(s) Oral every 8 hours  labetalol 100 milliGRAM(s) Enteral Tube two times a day  methadone    Tablet 5 milliGRAM(s) Oral daily  naloxegol 12.5 milliGRAM(s) Oral daily  ondansetron Injectable 4 milliGRAM(s) IV Push every 6 hours  ondansetron Injectable 4 milliGRAM(s) IV Push every 6 hours PRN  pantoprazole  Injectable 40 milliGRAM(s) IV Push daily  polyethylene glycol 3350 17 Gram(s) Oral daily PRN  QUEtiapine 50 milliGRAM(s) Oral two times a day  senna 2 Tablet(s) Oral at bedtime  sodium chloride 0.9% lock flush 10 milliLiter(s) IV Push every 1 hour PRN  trimethoprim  40 mG/sulfamethoxazole 200 mG Suspension 160 milliGRAM(s) Oral <User Schedule>    Drug Dosing Weight  Height (cm): 167.6 (23 Apr 2021 23:15)  Weight (kg): 83.9 (23 Apr 2021 23:15)  BMI (kg/m2): 29.9 (23 Apr 2021 23:15)  BSA (m2): 1.93 (23 Apr 2021 23:15)    CENTRAL LINE: [ ] YES [x ] NO  LOCATION:   DATE INSERTED:  REMOVE: [ ] YES [ ] NO  EXPLAIN:    RIVERA: [ ] YES [x ] NO    DATE INSERTED:  REMOVE:  [ ] YES [ ] NO  EXPLAIN:    PAST MEDICAL & SURGICAL HISTORY:  No pertinent past medical history    S/P moody  1990s    S/P appendectomy  1990s 05-31 @ 07:01  -  06-01 @ 07:00  --------------------------------------------------------  IN: 435 mL / OUT: 1675 mL / NET: -1240 mL        Mode: AC/ CMV (Assist Control/ Continuous Mandatory Ventilation)  RR (machine): 22  TV (machine): 450  FiO2: 40  PEEP: 5  ITime: 0.7  MAP: 11  PIP: 35      PHYSICAL EXAM:    GENERAL:anxious and  tachypneic in 30's while on TC , Now improved after returned to AC.   HEAD:  Atraumatic, Normocephalic  EYES: EOMI, PERRLA, conjunctiva and sclera clear  ENMT: No tonsillar erythema, exudates, or enlargement; Moist mucous membranes, Good dentition, No lesions  NECK: Trach intact, Supple, No JVD,  NERVOUS SYSTEM: Awake/ Alert &Oriented X2-3, Motor Strength 5/5 B/L upper and 4/5 lower extremities; CHEST/LUNG:Trach to vent. Diminished. ; No rales, rhonchi, wheezing, or rubs. Left CT clamped.   HEART: Regular rate and rhythm; No murmurs, rubs, or gallops  ABDOMEN: Soft, Nontender, Nondistended; Bowel sounds present. PEG tube intact.   EXTREMITIES:  2+ Peripheral Pulses, No clubbing, cyanosis, or edema  LYMPH: No lymphadenopathy noted  SKIN: posterior left hand open wound . DTI bilat buttocks.       LABS:  CBC Full  -  ( 01 Jun 2021 07:54 )  WBC Count : 7.04 K/uL  RBC Count : 2.37 M/uL  Hemoglobin : 8.9 g/dL  Hematocrit : 25.4 %  Platelet Count - Automated : 229 K/uL  Mean Cell Volume : 107.2 fl  Mean Cell Hemoglobin : 37.6 pg  Mean Cell Hemoglobin Concentration : 35.0 gm/dL  Auto Neutrophil # : x  Auto Lymphocyte # : x  Auto Monocyte # : x  Auto Eosinophil # : x  Auto Basophil # : x  Auto Neutrophil % : x  Auto Lymphocyte % : x  Auto Monocyte % : x  Auto Eosinophil % : x  Auto Basophil % : x    06-01    142  |  102  |  14  ----------------------------<  89  3.8   |  31  |  0.88    Ca    8.7      01 Jun 2021 07:54  Phos  3.3     06-01  Mg     1.8     06-01    TPro  6.2  /  Alb  2.7<L>  /  TBili  0.6  /  DBili  x   /  AST  20  /  ALT  32  /  AlkPhos  72  06-01              [  ]  DVT Prophylaxis  [  ]  Abnormal Nutritional Status -  Malnutrition   Cachexia            RADIOLOGY & ADDITIONAL STUDIES:  ***    `< from: Xray Abdomen 1 View PORTABLE -Routine (Xray Abdomen 1 View PORTABLE -Routine in AM.) (05.31.21 @ 08:49) >  Impression: Status post tracheostomy. Status post left chest pigtail catheter. Trace left pneumothorax is unchanged. Cardiomegaly. Moderate to severe multifocal patchy opacificationof both lungs, left greater than right, is unchanged.    Nonobstructive bowel gas pattern. No dilated loops of small or large bowel.    < end of copied text >      Goals of Care Discussion with Family/Proxy/Other   - see note from 4/14     ARANZA CHAMBERS    SCU progress note    INTERVAL HPI/OVERNIGHT EVENTS: **ICU downgrade yesterday. No acute events overnight. Pt seen and examined this morning. Pt awake, trach to vent, nods yes when asked if feeling okay. Per staff, pt reported abdominal discomfort this morning. No PEG residual noted. Pt had BM yesterday.       DNR [ ]   DNI  [  ] Full code    Covid - 19 PCR: negative 5/20    The 4Ms    What Matters Most: see GOC  Age appropriate Medications/Screen for High Risk Medication: Yes  Mentation: see CAM below  Mobility: defer to physical exam    The Confusion Assessment Method (CAM) Diagnostic Algorithm     1: Acute Onset or Fluctuating Course  - Is there evidence of an acute change in mental status from the patient’s baseline? Did the (abnormal) behavior  fluctuate during the day, that is, tend to come and go, or increase and decrease in severity?  [ ] YES [x ] NO     2: Inattention  - Did the patient have difficulty focusing attention, being easily distractible, or having difficulty keeping track of what was being said?  [ ] YES [ x] NO     3: Disorganized thinking  -Was the patient’s thinking disorganized or incoherent, such as rambling or irrelevant conversation, unclear or illogical flow of ideas, or unpredictable switching from subject to subject?  [ ] YES [x ] NO    4: Altered Level of consciousness?  [ ] YES [x ] NO    The diagnosis of delirium by CAM requires the presence of features 1 and 2 and either 3 or 4.    PRESSORS: [ ] YES [x ] NO  trimethoprim  40 mG/sulfamethoxazole 200 mG Suspension 160 milliGRAM(s) Oral <User Schedule>    Cardiovascular:  Echo 5/8/21: EF > 55%, grossly normal left ventricular systolic function.   Right ventricle not well visualized.  Trivial pericardial effusion.    labetalol 100 milliGRAM(s) Enteral Tube two times a day    Pulmonary:  ALBUTerol    90 MICROgram(s) HFA Inhaler 2 Puff(s) Inhalation every 6 hours PRN    Hematalogic:  enoxaparin Injectable 40 milliGRAM(s) SubCutaneous every 24 hours    Other:  acetaminophen    Suspension .. 650 milliGRAM(s) Enteral Tube every 12 hours PRN  artificial  tears Solution 1 Drop(s) Both EYES every 4 hours PRN  ascorbic acid 1000 milliGRAM(s) Oral daily  BACItracin   Ointment 1 Application(s) Topical two times a day  bisacodyl Suppository 10 milliGRAM(s) Rectal daily PRN  calcium carbonate 1250 mG  + Vitamin D (OsCal 500 + D) 1 Tablet(s) Oral daily  chlorhexidine 2% Cloths 1 Application(s) Topical <User Schedule>  cholecalciferol 1000 Unit(s) Oral daily  clonazePAM  Tablet 0.5 milliGRAM(s) Oral every 8 hours  gabapentin 100 milliGRAM(s) Oral every 8 hours  methadone    Tablet 5 milliGRAM(s) Oral daily  naloxegol 12.5 milliGRAM(s) Oral daily  ondansetron Injectable 4 milliGRAM(s) IV Push every 6 hours  ondansetron Injectable 4 milliGRAM(s) IV Push every 6 hours PRN  pantoprazole  Injectable 40 milliGRAM(s) IV Push daily  polyethylene glycol 3350 17 Gram(s) Oral daily PRN  QUEtiapine 50 milliGRAM(s) Oral two times a day  senna 2 Tablet(s) Oral at bedtime  sodium chloride 0.9% lock flush 10 milliLiter(s) IV Push every 1 hour PRN    acetaminophen    Suspension .. 650 milliGRAM(s) Enteral Tube every 12 hours PRN  ALBUTerol    90 MICROgram(s) HFA Inhaler 2 Puff(s) Inhalation every 6 hours PRN  artificial  tears Solution 1 Drop(s) Both EYES every 4 hours PRN  ascorbic acid 1000 milliGRAM(s) Oral daily  BACItracin   Ointment 1 Application(s) Topical two times a day  bisacodyl Suppository 10 milliGRAM(s) Rectal daily PRN  calcium carbonate 1250 mG  + Vitamin D (OsCal 500 + D) 1 Tablet(s) Oral daily  chlorhexidine 2% Cloths 1 Application(s) Topical <User Schedule>  cholecalciferol 1000 Unit(s) Oral daily  clonazePAM  Tablet 0.5 milliGRAM(s) Oral every 8 hours  enoxaparin Injectable 40 milliGRAM(s) SubCutaneous every 24 hours  gabapentin 100 milliGRAM(s) Oral every 8 hours  labetalol 100 milliGRAM(s) Enteral Tube two times a day  methadone    Tablet 5 milliGRAM(s) Oral daily  naloxegol 12.5 milliGRAM(s) Oral daily  ondansetron Injectable 4 milliGRAM(s) IV Push every 6 hours  ondansetron Injectable 4 milliGRAM(s) IV Push every 6 hours PRN  pantoprazole  Injectable 40 milliGRAM(s) IV Push daily  polyethylene glycol 3350 17 Gram(s) Oral daily PRN  QUEtiapine 50 milliGRAM(s) Oral two times a day  senna 2 Tablet(s) Oral at bedtime  sodium chloride 0.9% lock flush 10 milliLiter(s) IV Push every 1 hour PRN  trimethoprim  40 mG/sulfamethoxazole 200 mG Suspension 160 milliGRAM(s) Oral <User Schedule>    Drug Dosing Weight  Height (cm): 167.6 (23 Apr 2021 23:15)  Weight (kg): 83.9 (23 Apr 2021 23:15)  BMI (kg/m2): 29.9 (23 Apr 2021 23:15)  BSA (m2): 1.93 (23 Apr 2021 23:15)    CENTRAL LINE: [ ] YES [x ] NO  LOCATION:   DATE INSERTED:  REMOVE: [ ] YES [ ] NO  EXPLAIN:    RIVERA: [ ] YES [x ] NO    DATE INSERTED:  REMOVE:  [ ] YES [ ] NO  EXPLAIN:    PAST MEDICAL & SURGICAL HISTORY:  No pertinent past medical history    S/P moody  1990s    S/P appendectomy  1990s 05-31 @ 07:01  -  06-01 @ 07:00  --------------------------------------------------------  IN: 435 mL / OUT: 1675 mL / NET: -1240 mL        Mode: AC/ CMV (Assist Control/ Continuous Mandatory Ventilation)  RR (machine): 22  TV (machine): 450  FiO2: 40  PEEP: 5  ITime: 0.7  MAP: 11  PIP: 35      PHYSICAL EXAM:    GENERAL:anxious and  tachypneic in 30's while on TC , Now improved after returned to AC.   HEAD:  Atraumatic, Normocephalic  EYES: EOMI, PERRLA, conjunctiva and sclera clear  ENMT: No tonsillar erythema, exudates, or enlargement; Moist mucous membranes, Good dentition, No lesions  NECK: Trach intact, Supple, No JVD,  NERVOUS SYSTEM: Awake/ Alert &Oriented X2-3, Motor Strength 5/5 B/L upper and 4/5 lower extremities; CHEST/LUNG:Trach to vent. Diminished. ; No rales, rhonchi, wheezing, or rubs. Left CT clamped.   HEART: Regular rate and rhythm; No murmurs, rubs, or gallops  ABDOMEN: Soft, Nontender, Nondistended; Bowel sounds present. PEG tube intact.   EXTREMITIES:  2+ Peripheral Pulses, No clubbing, cyanosis, or edema  LYMPH: No lymphadenopathy noted  SKIN: posterior left hand open wound . DTI bilat buttocks.       LABS:  CBC Full  -  ( 01 Jun 2021 07:54 )  WBC Count : 7.04 K/uL  RBC Count : 2.37 M/uL  Hemoglobin : 8.9 g/dL  Hematocrit : 25.4 %  Platelet Count - Automated : 229 K/uL  Mean Cell Volume : 107.2 fl  Mean Cell Hemoglobin : 37.6 pg  Mean Cell Hemoglobin Concentration : 35.0 gm/dL  Auto Neutrophil # : x  Auto Lymphocyte # : x  Auto Monocyte # : x  Auto Eosinophil # : x  Auto Basophil # : x  Auto Neutrophil % : x  Auto Lymphocyte % : x  Auto Monocyte % : x  Auto Eosinophil % : x  Auto Basophil % : x    06-01    142  |  102  |  14  ----------------------------<  89  3.8   |  31  |  0.88    Ca    8.7      01 Jun 2021 07:54  Phos  3.3     06-01  Mg     1.8     06-01    TPro  6.2  /  Alb  2.7<L>  /  TBili  0.6  /  DBili  x   /  AST  20  /  ALT  32  /  AlkPhos  72  06-01              [  ]  DVT Prophylaxis  [  ]  Abnormal Nutritional Status -  Malnutrition   Cachexia        `Diet, NPO with Tube Feed:   Tube Feeding Modality: Gastrostomy  Vital AF 1.2 Leonardo  Total Volume for 24 Hours (mL): 240  Continuous  Starting Tube Feed Rate mL per Hour: 10  Until Goal Tube Feed Rate (mL per Hour): 10  Tube Feed Duration (in Hours): 24  Tube Feed Start Time: 10:00 (05-28-21 @ 09:34) [Active]          RADIOLOGY & ADDITIONAL STUDIES:  ***    `< from: Xray Abdomen 1 View PORTABLE -Routine (Xray Abdomen 1 View PORTABLE -Routine in AM.) (05.31.21 @ 08:49) >  Impression: Status post tracheostomy. Status post left chest pigtail catheter. Trace left pneumothorax is unchanged. Cardiomegaly. Moderate to severe multifocal patchy opacificationof both lungs, left greater than right, is unchanged.    Nonobstructive bowel gas pattern. No dilated loops of small or large bowel.    < end of copied text >      Goals of Care Discussion with Family/Proxy/Other   - see note from 4/14

## 2021-06-01 NOTE — PROGRESS NOTE ADULT - PROBLEM SELECTOR PLAN 3
Abd xray results as above  Continue PEG tube feedings  Continue with bowel regimen  Continue Naloxegol  Continue with Zofran prn  Serial abdominal exam  Monitor PEG residual

## 2021-06-01 NOTE — PROGRESS NOTE ADULT - PROBLEM SELECTOR PLAN 6
Delirium due to COVID  infection, PNA,  metabolic derangement, benzo withdrawal, prolonged hospital stay  Improving  Continue Klonopin 0.5mg q8h, Seroquel 50mg BID  Taper Methadone tomorrow  Supportive measures

## 2021-06-01 NOTE — PROGRESS NOTE ADULT - ATTENDING COMMENTS
55 yr old  man , non smoker with  moody 1990s presented 3/14 with x9 days worsening cough, subjective fevers, and SOB, with x2-3 days dysuria and central, non-radiating, constant CP. Admitted to medicine unit  for acute hypoxic respiratory failure secondary to pna from covid-19 infection .     Assessment:  1. Acute hypoxic respiratory failure  2. Covid-19 infection   3. Transaminitis  4. Prediabetes  5. Bilateral pneumothorax  6. Septic shock - resolved  7. Anemia  8. bowel obstruction/ileus    Plan   - Cont. tube feedings  - Zofran for nausea control  -Chest tube clamped per surgery  -Small PTX noted at left apex  -Thoracic surgery follow up requested regarding possible CT removal  -S/p tracheostomy placement 4/21   -S/p G-tube 4/23  -trach collar during day time as tolerated, full vent support overnight  -PT evaluation, upright positioning as tolerated  -Tapering dose of steroids as ordered, bactrim for PCP prophylaxis   -C-diff PCR negative  -Cont. movantik, lowered dose due to vomiting  -Taper Clonazepam and Methadone  -dvt/gi prophy  -hemodynamic monitoring   -Prognosis is guarded.

## 2021-06-01 NOTE — PROGRESS NOTE ADULT - SUBJECTIVE AND OBJECTIVE BOX
Pt is awake, alert, lying in bed in NAD. Trach, CT in place.     INTERVAL HPI/OVERNIGHT EVENTS:      VITAL SIGNS:  T(F): 99.1 (06-01-21 @ 05:00)  HR: 76 (06-01-21 @ 08:23)  BP: 129/72 (06-01-21 @ 05:00)  RR: 20 (06-01-21 @ 05:00)  SpO2: 100% (06-01-21 @ 08:23)  Wt(kg): --  I&O's Detail    31 May 2021 07:01  -  01 Jun 2021 07:00  --------------------------------------------------------  IN:    Enteral Tube Flush: 135 mL    Lactated Ringers: 150 mL    Vital1.5: 150 mL  Total IN: 435 mL    OUT:    Chest Tube (mL): 0 mL    Incontinent per Condom Catheter (mL): 1300 mL    Voided (mL): 375 mL  Total OUT: 1675 mL    Total NET: -1240 mL        Mode: AC/ CMV (Assist Control/ Continuous Mandatory Ventilation)  RR (machine): 22  TV (machine): 450  FiO2: 40  PEEP: 5  ITime: 0.7  MAP: 11  PIP: 32        REVIEW OF SYSTEMS:    CONSTITUTIONAL:  No fevers, chills, sweats    HEENT:  Eyes:  No diplopia or blurred vision. ENT:  No earache, sore throat or runny nose.    CARDIOVASCULAR:  No pressure, squeezing, tightness, or heaviness about the chest; no palpitations.    RESPIRATORY:  Per HPI    GASTROINTESTINAL:  No abdominal pain, nausea, vomiting or diarrhea.    GENITOURINARY:  No dysuria, frequency or urgency.    NEUROLOGIC:  No paresthesias, fasciculations, seizures or weakness.    PSYCHIATRIC:  No disorder of thought or mood.      PHYSICAL EXAM:    Constitutional: Well developed and nourished  Eyes: Perrla  ENMT: normal  Neck:supple  Respiratory: good air entry; trach; chest tube   Cardiovascular: S1 S2 regular  Gastrointestinal: Soft, Non tender  Extremities: No edema  Vascular:normal  Neurological:Awake, alert   Musculoskeletal:Normal      MEDICATIONS  (STANDING):  ascorbic acid 1000 milliGRAM(s) Oral daily  BACItracin   Ointment 1 Application(s) Topical two times a day  calcium carbonate 1250 mG  + Vitamin D (OsCal 500 + D) 1 Tablet(s) Oral daily  chlorhexidine 2% Cloths 1 Application(s) Topical <User Schedule>  cholecalciferol 1000 Unit(s) Oral daily  clonazePAM  Tablet 0.5 milliGRAM(s) Oral every 8 hours  enoxaparin Injectable 40 milliGRAM(s) SubCutaneous every 24 hours  gabapentin 100 milliGRAM(s) Oral every 8 hours  labetalol 100 milliGRAM(s) Enteral Tube two times a day  methadone    Tablet 5 milliGRAM(s) Oral daily  naloxegol 12.5 milliGRAM(s) Oral daily  ondansetron Injectable 4 milliGRAM(s) IV Push every 6 hours  pantoprazole  Injectable 40 milliGRAM(s) IV Push daily  QUEtiapine 50 milliGRAM(s) Oral two times a day  senna 2 Tablet(s) Oral at bedtime  trimethoprim  40 mG/sulfamethoxazole 200 mG Suspension 160 milliGRAM(s) Oral <User Schedule>    MEDICATIONS  (PRN):  acetaminophen    Suspension .. 650 milliGRAM(s) Enteral Tube every 12 hours PRN Temp greater or equal to 38C (100.4F), Mild Pain (1 - 3)  ALBUTerol    90 MICROgram(s) HFA Inhaler 2 Puff(s) Inhalation every 6 hours PRN Shortness of Breath and/or Wheezing  artificial  tears Solution 1 Drop(s) Both EYES every 4 hours PRN Dry Eyes  bisacodyl Suppository 10 milliGRAM(s) Rectal daily PRN Constipation  ondansetron Injectable 4 milliGRAM(s) IV Push every 6 hours PRN Nausea and/or Vomiting  polyethylene glycol 3350 17 Gram(s) Oral daily PRN Constipation  sodium chloride 0.9% lock flush 10 milliLiter(s) IV Push every 1 hour PRN Pre/post blood products, medications, blood draw, and to maintain line patency      Allergies    No Known Allergies    Intolerances        LABS:                        8.9    7.04  )-----------( 229      ( 01 Jun 2021 07:54 )             25.4     06-01    142  |  102  |  14  ----------------------------<  89  3.8   |  31  |  0.88    Ca    8.7      01 Jun 2021 07:54  Phos  3.3     06-01  Mg     1.8     06-01    TPro  6.2  /  Alb  2.7<L>  /  TBili  0.6  /  DBili  x   /  AST  20  /  ALT  32  /  AlkPhos  72  06-01              CAPILLARY BLOOD GLUCOSE      POCT Blood Glucose.: 100 mg/dL (01 Jun 2021 07:53)        RADIOLOGY & ADDITIONAL TESTS:    CXR:  < from: Xray Abdomen 1 View PORTABLE -Routine (Xray Abdomen 1 View PORTABLE -Routine in AM.) (05.31.21 @ 08:49) >  Impression: Status post tracheostomy. Status post left chest pigtail catheter. Trace left pneumothorax is unchanged. Cardiomegaly. Moderate to severe multifocal patchy opacificationof both lungs, left greater than right, is unchanged.    Nonobstructive bowel gas pattern. No dilated loops of small or large bowel.      < end of copied text >    Ct scan chest:  IMPRESSION: Delayed nephrographic phase enhancement of the nonobstructed bilateral kidneys, suggestive of acute renal insufficiency. No evidence of bowel obstruction. Proctitis.    ekg;    echo:

## 2021-06-01 NOTE — PROGRESS NOTE ADULT - PROBLEM SELECTOR PLAN 4
S/p 4 PRBC total  - Now Hg stable > 8  CTH and CT abdomen w/o contrast no evidence of bleed    No active signs of bleeding (No hematemesis, melena or hematuria)  Hemolysis w/u- negative  Iron studies show ACD  Dr Andrade on board   F/u CBC daily

## 2021-06-01 NOTE — CHART NOTE - NSCHARTNOTEFT_GEN_A_CORE
Attempted to contact pt brother Brennan at [phone] via , for pt update. Msg left.     Jocy Whelan NP Medicine/SCU

## 2021-06-01 NOTE — PROGRESS NOTE ADULT - PROBLEM SELECTOR PLAN 10
S/p trach /PEG  SBT with PS during the day, Full  mechanical ventilatory at night.  Will require vent facility  F/u with Thoracic surgery re: Left CT removal   Daily PT   F/u repeat COVID PCR

## 2021-06-01 NOTE — PROGRESS NOTE ADULT - ASSESSMENT
55M, no PMH, PSH appy and moody 1990s presented 3/14 with x9 days worsening cough, subjective fevers, and SOB, with x2-3 days dysuria and central, non-radiating, constant CP, admitted to [facility] for management of COVID PNA. Given discomfort of breathing, placed on 4L NC, with improvement. Labs notable for lymphopenia and neutrophilia despite WBC wnl, d-dimer 423, AST//114, lactate 3.3, and . Trop negative x1. +COVID. CXR notable for b/l infiltrates, L>R. Given x1 dose Tylenol and 1L IVF bolus in ED. Initially started on Remdesivir and Dexamethasone, Remdesivir was later discontinued secondary to positive antibodies and elevated LFT's. Not a  candidate for Tociluzimab due to elevated LFT. Patient continued to have respiratory distress requiring 15 L NRB support.    3/19/2021- Patient got transferred to ICU  due to increased work of breathing despite being on 15 L NRB . Patient was on HFNC to keep saturation >90%. Chest X ray showed Grossly stable mild left apical pneumothorax. Stable small pneumomediastinum. Soft tissue emphysema at the neck bases again noted. Stable patchy bilateral pulmonary infiltrates. Thoracic surgery consulted, no intervention at this time.  3/22/2021 - Chest X ray revealed trace ptx right and mild left pneumothorax  3/24/2021 - Overnight pt saturation remains in early 90s and late 80s . Pt remains on HFNC. CXR stable  3/25/2021- Chest X ray showed Overlying chin obscures lung apices. Questionable tiny left apical pneumothorax. Trial of semi- pulse steroid .. solumedrol 250 bid x 3 days   3/28- Started on solumedrol 40 mg bid  3/29/2021 - At 5.30 am, pt was in severe respiratory distress with tachypneic in 40s, tachycardic in 140s, saturating at high 70s, so decision was made to sedate and intubate the pt. Anesthesiologist intubated the patient. A central line, NGT  was placed  4/1/2021- Palliative care consulted. patient's brother/Idalgo ([phone]) has agreed to act as surrogate. Prednisone tapered to  once daily   4/6/2021- Left TLC placed, Hypertensive - s/p Lopressor 5 mg X 1  4/8/2021- Lasix daily and HCTz 50 mg added to aim for negative fluid balance, Left Chest tube placed, added Albumin (4/8-4/10)  4/11/2021-patient was having fever 100.8 rectal, s/p Tylenol , s/p 1 dose of vancomycin, on zosyn - New infiltrate on CXR ,likely developing VAP  4/13/2021- Continue to spike fevers, Zosyn switched to Meropenem. ID- Dr Anand following, new TLC placed  4/15/2021- Chest tuber discontinued   4/18/2021- BP running on lower side, Started on phenylephrine, Ibuprofen D/Connor , FiO2 increased to 70%Patient had large pneumothorax note don Left, thoracic surgery was consulted ,  left chest tube placed.  4/20/2021- Patient received 1 dose of Epifanio for vent asynchrony, A line placed, vent settings changed fro mPC to CVVC     4/21/2021- Trach placed  4/23/2021- PEG tube placed  4/25/2021 - cxr 4/25/21 with A 40% LEFT pneumothorax. LEFT multi-sidehole pigtail catheter overlies LEFT lower hemithorax.. Bilateral multifocal and diffuse ill-defined airspace opacities..  Follow-up AP portable chest radiograph 4/25/2021 AT 8:58 AM: Residual LEFT 30% upper zone pneumothorax.  4/26/2021- Patient noted to have hemoglobin drop to 6.8 from 7.5, will transfuse one unit of PRBC and monitor CBC.   4/27/2021- Patient's hemoglobin dropped again to 7, will give two unit of PRBC  4/28/2021- On 4/28 Hb 6.8, s/p 1 unit of PRBC hb 7.5 now. 4 PRBC total  CTH and CT abdomen w/o contrast no evidence of bleed  No active signs of bleeding (No hematemesis, melena or hematuria)  F/u hemolysis w/u- negative so far, elevated reticulocytes . Dr Andrade consulted  5/2/2021-Patients Hb remained stable, was tachypneic and breathing over the vent so 1 mg push of Dilaudid X 2 was given. V: 20/400/60%/3+ on precedex drip at 1.5.   5/3/2021- patient was found to have large gastric bubble, G tube was connected to wall suction, repeat CXR showed improvement.  5/5/2021 - Hemoglobin dropped from 8.8 to 7.4.Will add bactrim empirically as patient is on chronic prednisone for organizing pneumonia. Tube feeds were held because of diarrhea  5/6/2021- CT chest was done, which showed mild PTX. Left 3rd intercostal space Chest tube was placed. Patient was hypotensive overnight. Cristiano temperature. Was septic, will send cultures if spikes temperature again. Patient was retaining urine, andrea was placed overnight. Will start on albumin and give one dose of lasix as patient was hypotensive and edematous in upper extremities, also had low albumin. Patient was getting agitated, will give ketamine IV push and monitor if it helps.  5/10/2021- Pt tachycardic overnight and hypertensive to 228/116 w/p 1x lopressor. Stopped levophed. Pt became hypotensive. Then started phenylephrine. BP now stable. Will d/c methadone and seroquel and continue on precedex for sedation. CXR showing apical pneumothorax   5/13/2021- Pt tachycardic to 160-170 yesterday afternoon and night. Pupils dilated. Did not respond to increased doses of opiates and benzos. Developed fever to 102.3 overnight with episode of vomiting. UA was positive. started empirically on vanc/zosyn - will d/c for now as no clear source of infection. AM abdominal xray w/ likely ileus. Will restart Free water. Hold Lasix. CT abdomen done showing ileus and possible fecal matter in rectum. Fecal disimpaction performed, small ball of stool retrieved with liquid stool excreted subsequently. Spoke to Surgery RUBI Wood. Tube feeds stopped and PEG tube placed to suction with no output.. Also will restart Precedex as likely in withdrawal and clonid  5/18/2021- Patient was tachy and agitated. Later he had hypotension and bradycardia. He was given 1L bolus and was restarted on pheny after which he improved  5/25- Behavioral health consulted due to difficultyn weaning off sedation . Recommended C/w Klonopin to 1 mg q8h standing (no PRNs), with plan to further taper as tolerated. C/w Seroquel 50 mg BID standing  5/26/2021- BP was elevated, was started on labetalol. d/c andrea.  CT placed on water seal, c/w water seal  5/27/2021- Vomiting X2, held feeding, CT Abdomen/Pelvis ordered  5/28/2021- Placed on trach collar today  5/30/2021- Patient was on trach collar throughout the day yesterday and placed back on vent overnight. No other acute events overnight.    5/31/2021- Decreased Methadone to 5 mg daily and Klonopin to 0.5 mg every 8 hours. Plan was made to transfer patient to SCU  6/1 Abd discomfort. AXR results as above, no bowel obstruction. Resume PEG feedings.   6/1 TC trial. Pt tolerated only 10 minutes, RR ~30's and anxious.  Will attempt SBT with PS.

## 2021-06-01 NOTE — BH CONSULTATION LIAISON PROGRESS NOTE - OTHER
Aphonic, communicates by writing and gestures Communicates by writing Writes "I want to live" when asked about SI

## 2021-06-01 NOTE — PROGRESS NOTE ADULT - PROBLEM SELECTOR PLAN 1
S/p Dexamethasone   No Remdesivir  due to positive antibodies   Covid19 PCR negative now, off isolation   CXR results as above  Continue Bactrim for empiric coverage   Continue with prednisone taper  Continue inhalers  Continue ventilatory support.   Tolerated 10 minutes of Trach collar today  Will continue SBT with PS during the day as tolerated.   Full vent support overnight  Monitor oxygenation

## 2021-06-02 LAB
ALBUMIN SERPL ELPH-MCNC: 2.8 G/DL — LOW (ref 3.5–5)
ALP SERPL-CCNC: 70 U/L — SIGNIFICANT CHANGE UP (ref 40–120)
ALT FLD-CCNC: 33 U/L DA — SIGNIFICANT CHANGE UP (ref 10–60)
ANION GAP SERPL CALC-SCNC: 9 MMOL/L — SIGNIFICANT CHANGE UP (ref 5–17)
AST SERPL-CCNC: 20 U/L — SIGNIFICANT CHANGE UP (ref 10–40)
BILIRUB SERPL-MCNC: 0.6 MG/DL — SIGNIFICANT CHANGE UP (ref 0.2–1.2)
BUN SERPL-MCNC: 16 MG/DL — SIGNIFICANT CHANGE UP (ref 7–18)
CALCIUM SERPL-MCNC: 8.9 MG/DL — SIGNIFICANT CHANGE UP (ref 8.4–10.5)
CHLORIDE SERPL-SCNC: 100 MMOL/L — SIGNIFICANT CHANGE UP (ref 96–108)
CO2 SERPL-SCNC: 31 MMOL/L — SIGNIFICANT CHANGE UP (ref 22–31)
CREAT SERPL-MCNC: 0.9 MG/DL — SIGNIFICANT CHANGE UP (ref 0.5–1.3)
GLUCOSE BLDC GLUCOMTR-MCNC: 102 MG/DL — HIGH (ref 70–99)
GLUCOSE BLDC GLUCOMTR-MCNC: 131 MG/DL — HIGH (ref 70–99)
GLUCOSE BLDC GLUCOMTR-MCNC: 76 MG/DL — SIGNIFICANT CHANGE UP (ref 70–99)
GLUCOSE BLDC GLUCOMTR-MCNC: 87 MG/DL — SIGNIFICANT CHANGE UP (ref 70–99)
GLUCOSE SERPL-MCNC: 97 MG/DL — SIGNIFICANT CHANGE UP (ref 70–99)
HCT VFR BLD CALC: 26.3 % — LOW (ref 39–50)
HGB BLD-MCNC: 9.3 G/DL — LOW (ref 13–17)
MAGNESIUM SERPL-MCNC: 1.8 MG/DL — SIGNIFICANT CHANGE UP (ref 1.6–2.6)
MCHC RBC-ENTMCNC: 35.4 GM/DL — SIGNIFICANT CHANGE UP (ref 32–36)
MCHC RBC-ENTMCNC: 36.8 PG — HIGH (ref 27–34)
MCV RBC AUTO: 104 FL — HIGH (ref 80–100)
NRBC # BLD: 0 /100 WBCS — SIGNIFICANT CHANGE UP (ref 0–0)
PHOSPHATE SERPL-MCNC: 2.9 MG/DL — SIGNIFICANT CHANGE UP (ref 2.5–4.5)
PLATELET # BLD AUTO: 228 K/UL — SIGNIFICANT CHANGE UP (ref 150–400)
POTASSIUM SERPL-MCNC: 3.8 MMOL/L — SIGNIFICANT CHANGE UP (ref 3.5–5.3)
POTASSIUM SERPL-SCNC: 3.8 MMOL/L — SIGNIFICANT CHANGE UP (ref 3.5–5.3)
PROT SERPL-MCNC: 6.3 G/DL — SIGNIFICANT CHANGE UP (ref 6–8.3)
RBC # BLD: 2.53 M/UL — LOW (ref 4.2–5.8)
RBC # FLD: 16.1 % — HIGH (ref 10.3–14.5)
SODIUM SERPL-SCNC: 140 MMOL/L — SIGNIFICANT CHANGE UP (ref 135–145)
WBC # BLD: 7.03 K/UL — SIGNIFICANT CHANGE UP (ref 3.8–10.5)
WBC # FLD AUTO: 7.03 K/UL — SIGNIFICANT CHANGE UP (ref 3.8–10.5)

## 2021-06-02 RX ORDER — CLONAZEPAM 1 MG
0.5 TABLET ORAL EVERY 12 HOURS
Refills: 0 | Status: DISCONTINUED | OUTPATIENT
Start: 2021-06-02 | End: 2021-06-09

## 2021-06-02 RX ADMIN — QUETIAPINE FUMARATE 50 MILLIGRAM(S): 200 TABLET, FILM COATED ORAL at 05:46

## 2021-06-02 RX ADMIN — Medication 100 MILLIGRAM(S): at 17:41

## 2021-06-02 RX ADMIN — ENOXAPARIN SODIUM 40 MILLIGRAM(S): 100 INJECTION SUBCUTANEOUS at 11:35

## 2021-06-02 RX ADMIN — Medication 0.5 MILLIGRAM(S): at 05:46

## 2021-06-02 RX ADMIN — METHADONE HYDROCHLORIDE 5 MILLIGRAM(S): 40 TABLET ORAL at 11:35

## 2021-06-02 RX ADMIN — Medication 160 MILLIGRAM(S): at 05:47

## 2021-06-02 RX ADMIN — Medication 1 APPLICATION(S): at 17:42

## 2021-06-02 RX ADMIN — GABAPENTIN 100 MILLIGRAM(S): 400 CAPSULE ORAL at 21:47

## 2021-06-02 RX ADMIN — Medication 100 MILLIGRAM(S): at 05:46

## 2021-06-02 RX ADMIN — Medication 1 APPLICATION(S): at 05:46

## 2021-06-02 RX ADMIN — SENNA PLUS 2 TABLET(S): 8.6 TABLET ORAL at 21:47

## 2021-06-02 RX ADMIN — GABAPENTIN 100 MILLIGRAM(S): 400 CAPSULE ORAL at 05:46

## 2021-06-02 RX ADMIN — QUETIAPINE FUMARATE 50 MILLIGRAM(S): 200 TABLET, FILM COATED ORAL at 17:53

## 2021-06-02 RX ADMIN — Medication 1 TABLET(S): at 11:35

## 2021-06-02 RX ADMIN — Medication 1000 UNIT(S): at 11:35

## 2021-06-02 RX ADMIN — PANTOPRAZOLE SODIUM 40 MILLIGRAM(S): 20 TABLET, DELAYED RELEASE ORAL at 11:36

## 2021-06-02 RX ADMIN — NALOXEGOL OXALATE 12.5 MILLIGRAM(S): 12.5 TABLET, FILM COATED ORAL at 11:35

## 2021-06-02 RX ADMIN — Medication 1000 MILLIGRAM(S): at 11:35

## 2021-06-02 RX ADMIN — Medication 15 MILLIGRAM(S): at 05:46

## 2021-06-02 RX ADMIN — Medication 0.5 MILLIGRAM(S): at 17:42

## 2021-06-02 RX ADMIN — CHLORHEXIDINE GLUCONATE 1 APPLICATION(S): 213 SOLUTION TOPICAL at 05:46

## 2021-06-02 RX ADMIN — GABAPENTIN 100 MILLIGRAM(S): 400 CAPSULE ORAL at 15:05

## 2021-06-02 NOTE — PROGRESS NOTE ADULT - SUBJECTIVE AND OBJECTIVE BOX
Patient is a 55y old  Male who presents with a chief complaint of SOB (01 Jun 2021 10:46)    pt seen in icu [  ], reg med floor scu [ x  ], bed [ x ], chair at bedside [   ], awake and responsive [ x ], mildly sedated, [  ],    nad [x  ]        Allergies    No Known Allergies        Vitals    T(F): 98.3 (06-02-21 @ 05:15), Max: 98.4 (06-01-21 @ 14:45)  HR: 64 (06-02-21 @ 05:15) (64 - 101)  BP: 135/79 (06-02-21 @ 05:15) (135/76 - 136/70)  RR: 20 (06-02-21 @ 05:15) (20 - 23)  SpO2: 100% (06-02-21 @ 05:15) (97% - 100%)  Wt(kg): --  CAPILLARY BLOOD GLUCOSE      POCT Blood Glucose.: 87 mg/dL (02 Jun 2021 06:07)      Labs                          8.9    7.04  )-----------( 229      ( 01 Jun 2021 07:54 )             25.4       06-01    142  |  102  |  14  ----------------------------<  89  3.8   |  31  |  0.88    Ca    8.7      01 Jun 2021 07:54  Phos  3.3     06-01  Mg     1.8     06-01    TPro  6.2  /  Alb  2.7<L>  /  TBili  0.6  /  DBili  x   /  AST  20  /  ALT  32  /  AlkPhos  72  06-01            .Sputum Sputum  04-16 @ 04:42   Moderate Enterobacter aerogenes (Carbapenem Resistant)  Normal Respiratory Monserrat present  --  Enterobacter aerogenes (Carbapenem Resistant)      .Urine Clean Catch (Midstream)  03-15 @ 00:52   No growth  --  --          Radiology Results      Meds    MEDICATIONS  (STANDING):  ascorbic acid 1000 milliGRAM(s) Oral daily  BACItracin   Ointment 1 Application(s) Topical two times a day  calcium carbonate 1250 mG  + Vitamin D (OsCal 500 + D) 1 Tablet(s) Oral daily  chlorhexidine 2% Cloths 1 Application(s) Topical <User Schedule>  cholecalciferol 1000 Unit(s) Oral daily  clonazePAM  Tablet 0.5 milliGRAM(s) Oral every 8 hours  enoxaparin Injectable 40 milliGRAM(s) SubCutaneous every 24 hours  gabapentin 100 milliGRAM(s) Oral every 8 hours  labetalol 100 milliGRAM(s) Enteral Tube two times a day  methadone    Tablet 5 milliGRAM(s) Oral daily  naloxegol 12.5 milliGRAM(s) Oral daily  pantoprazole  Injectable 40 milliGRAM(s) IV Push daily  predniSONE   Tablet 15 milliGRAM(s) Oral daily  QUEtiapine 50 milliGRAM(s) Oral two times a day  senna 2 Tablet(s) Oral at bedtime  trimethoprim  40 mG/sulfamethoxazole 200 mG Suspension 160 milliGRAM(s) Oral <User Schedule>      MEDICATIONS  (PRN):  acetaminophen    Suspension .. 650 milliGRAM(s) Enteral Tube every 12 hours PRN Temp greater or equal to 38C (100.4F), Mild Pain (1 - 3)  ALBUTerol    90 MICROgram(s) HFA Inhaler 2 Puff(s) Inhalation every 6 hours PRN Shortness of Breath and/or Wheezing  artificial  tears Solution 1 Drop(s) Both EYES every 4 hours PRN Dry Eyes  bisacodyl Suppository 10 milliGRAM(s) Rectal daily PRN Constipation  ondansetron Injectable 4 milliGRAM(s) IV Push every 6 hours PRN Nausea and/or Vomiting  polyethylene glycol 3350 17 Gram(s) Oral daily PRN Constipation  sodium chloride 0.9% lock flush 10 milliLiter(s) IV Push every 1 hour PRN Pre/post blood products, medications, blood draw, and to maintain line patency      Physical Exam    Neuro :  no focal deficits  Respiratory: CTA B/L  CV: RRR, S1S2, no murmurs,   Abdominal: Soft, NT, ND +BS, gastrostomy tube inplace  Extremities: edema of extrem, + peripheral pulses      ASSESSMENT      Hypoxemia 2nd to covid pna   transaminitis  prediabetes  h/o appendectomy  cholecystectomy        PLAN    cont cont precautions  covid 5/14/21 neg noted above  d/c remdesevir given covid ab positive noted   completed dexamethasone   started pulse steroids for 3 days - 250mg solumedrol bid now tapered off 4/14/21   prednisone 20 daily d/c 6/1/21   cont on bactrim empirically, TIW   cont asa, vit c,    cont albuterol inhaler   pulm f/u  procalcitonin, D-dimer, crp, ldh, ferritin, lactate noted ,    cont tylenol prn,   cont robitussin prn   pt off precedex    pt on methadone   pain mgmt eval noted   off phenylephrine drip for hypotension  clonidine held 2nd to low bp  s/p intubation 3/29/21   s/p tracheostomy 4/21/21  O2 sat 100% (97% - 100%) mv 40%  O2 via mech vent and wean as tolerated   vent mgmt as per scu  thoracic surg f/u   s/p L chest tube replacement 4/18/21 for recurrence of left pneumothorax   cxr 4/20/21 with Unchanged advanced infiltrates and catheter left chest tube noted   CXR 4/11/21 with : No interval change compared to one day prior. No pneumothorax noted.   unable to flush or aspirate tube fully, noted debris in tubing which was milked out.   Once material removed from tubing able to aspirated and flush fully. Also changed dressing on pigtail which appears to be in good position.   Monitor O2 status   s/p tracheostomy 4/21/21   cxr 4/21/21 with No evidence of active chest disease. Tracheostomy tube in place otherwise no significant change noted   cxr 4/25/21 with A 40% LEFT pneumothorax. LEFT multi-sidehole pigtail catheter overlies LEFT lower hemithorax.. Bilateral multifocal and diffuse ill-defined airspace opacities..  Follow-up AP portable chest radiograph 4/25/2021 AT 8:58 AM: Residual LEFT 30% upper zone pneumothorax. Otherwise no interval change.noted    s/p 2nd chest tube 5/5/21  cxr 5/6/21 with Tracheostomy and catheter left chest tubes remain. , There are significant diffuse advanced infiltrates again noted. No pneumothorax. Above findings are similar to study earlier in the day. Present film shows a left jugular line inserted   with tip entering the superior vena cava noted   cxr 5/30/21 with Tracheostomy cannula left chest catheter reidentified in position. No change small simple left apical pneumothorax. No change bilateral airspace opacities. Trace left pleural effusion suggested noted   cxr 5/31/21 with Status post tracheostomy. Status post left chest pigtail catheter. Trace left pneumothorax is unchanged. Cardiomegaly. Moderate to severe multifocal patchy opacificationof both lungs, left greater than right, is unchanged. Nonobstructive bowel gas pattern. No dilated loops of small or large bowel noted above.  s/p surgical placement of gastrostomy tube 4/23/2021.   abd xray 5/10/21 with ileus noted   dressing changed daily for seroma   abd xray 5/21/21 with Decreased bowel ileus compared to prior 5/20/2021 exam noted above.   abd xray 5/22/21 with No dilated loops of bowel noted above.   cont tube feeding   CT scan of the chest 5/13/21 demonstrates diffuse bilateral infiltrates with a region of consolidation at the left lung base. Small left pneumothorax. 2 left chest tubes noted in place noted above.  CT scan of the abdomen and pelvis 5/13/21 demonstrates diffuse dilatation of small and large bowel loops most consistent with ileus noted   xray abd with  5/14/21 with Ingested contrast within nonobstructed large bowel to the level of rectum. No acute radiographic intra-abdominal findings noted   ct abd-pelv 5/27/21 with Delayed nephrographic phase enhancement of the nonobstructed bilateral kidneys, suggestive of acute renal insufficiency. No evidence of bowel obstruction. Proctitis noted above.   id f/u   patient completed course of Meropenem  leukocytosis resolved  sputum cx with Enterobacter aerogenes (Carbapenem Resistant) noted above   tmx 99.1  Completed  meropenem, s/p 1 dose of Vancomycin   completed zosyn  lispro ss   s/p 4 units prbc for anemia  f/u h/h    transfuse prbc as needed  heme onc f/u  retic is elevated.    Haptoglobin normal  ? daily phlebotomy  no hemolysis, Fe/B12/folate adequate  hemolysis is unlikely even his baseline haptoglobin may be very elevated due to COVID  direct pamella neg noted    psych f/u/  C/w Klonopin to 1 mg q8h standing (no PRNs), with plan to further taper as tolerated  cont Seroquel to 50 mg BID standing starting tonight  Continue delirium precautions: Frequent reorientation, familiar pictures and objects at bedside, natural light in daytime,   consistent sleep/wake schedule, adequate hydration and nutrition, sensory aids (hearing aids, glasses) present if needed, minimizing noise and overstimulation,   clustering care to minimize overnight interruptions, judicious use of deliriogenic medications (anticholinergics, benzodiazepines and opioid analgesics), minimize use of restraints  cont current meds   mgmt as per scu         Patient is a 55y old  Male who presents with a chief complaint of SOB (01 Jun 2021 10:46)    pt seen in icu [  ], reg med floor scu [ x  ], bed [ x ], chair at bedside [   ], awake and responsive [ x ], mildly sedated, [  ],    nad [x  ]        Allergies    No Known Allergies        Vitals    T(F): 98.3 (06-02-21 @ 05:15), Max: 98.4 (06-01-21 @ 14:45)  HR: 64 (06-02-21 @ 05:15) (64 - 101)  BP: 135/79 (06-02-21 @ 05:15) (135/76 - 136/70)  RR: 20 (06-02-21 @ 05:15) (20 - 23)  SpO2: 100% (06-02-21 @ 05:15) (97% - 100%)  Wt(kg): --  CAPILLARY BLOOD GLUCOSE      POCT Blood Glucose.: 87 mg/dL (02 Jun 2021 06:07)      Labs                          8.9    7.04  )-----------( 229      ( 01 Jun 2021 07:54 )             25.4       06-01    142  |  102  |  14  ----------------------------<  89  3.8   |  31  |  0.88    Ca    8.7      01 Jun 2021 07:54  Phos  3.3     06-01  Mg     1.8     06-01    TPro  6.2  /  Alb  2.7<L>  /  TBili  0.6  /  DBili  x   /  AST  20  /  ALT  32  /  AlkPhos  72  06-01            .Sputum Sputum  04-16 @ 04:42   Moderate Enterobacter aerogenes (Carbapenem Resistant)  Normal Respiratory Monserrat present  --  Enterobacter aerogenes (Carbapenem Resistant)      .Urine Clean Catch (Midstream)  03-15 @ 00:52   No growth  --  --          Radiology Results    < from: Xray Chest 1 View- PORTABLE-Urgent (Xray Chest 1 View- PORTABLE-Urgent .) (06.01.21 @ 14:29) >  Heart magnified by technique. Tracheostomy remains.    Left pigtail catheter seen earlier in the day has been removed.    Persistent amorphous infiltrate at the site is seen. No pneumothorax.    IMPRESSION: Uneventful removal of left chest tube.    < end of copied text >      Meds    MEDICATIONS  (STANDING):  ascorbic acid 1000 milliGRAM(s) Oral daily  BACItracin   Ointment 1 Application(s) Topical two times a day  calcium carbonate 1250 mG  + Vitamin D (OsCal 500 + D) 1 Tablet(s) Oral daily  chlorhexidine 2% Cloths 1 Application(s) Topical <User Schedule>  cholecalciferol 1000 Unit(s) Oral daily  clonazePAM  Tablet 0.5 milliGRAM(s) Oral every 8 hours  enoxaparin Injectable 40 milliGRAM(s) SubCutaneous every 24 hours  gabapentin 100 milliGRAM(s) Oral every 8 hours  labetalol 100 milliGRAM(s) Enteral Tube two times a day  methadone    Tablet 5 milliGRAM(s) Oral daily  naloxegol 12.5 milliGRAM(s) Oral daily  pantoprazole  Injectable 40 milliGRAM(s) IV Push daily  predniSONE   Tablet 15 milliGRAM(s) Oral daily  QUEtiapine 50 milliGRAM(s) Oral two times a day  senna 2 Tablet(s) Oral at bedtime  trimethoprim  40 mG/sulfamethoxazole 200 mG Suspension 160 milliGRAM(s) Oral <User Schedule>      MEDICATIONS  (PRN):  acetaminophen    Suspension .. 650 milliGRAM(s) Enteral Tube every 12 hours PRN Temp greater or equal to 38C (100.4F), Mild Pain (1 - 3)  ALBUTerol    90 MICROgram(s) HFA Inhaler 2 Puff(s) Inhalation every 6 hours PRN Shortness of Breath and/or Wheezing  artificial  tears Solution 1 Drop(s) Both EYES every 4 hours PRN Dry Eyes  bisacodyl Suppository 10 milliGRAM(s) Rectal daily PRN Constipation  ondansetron Injectable 4 milliGRAM(s) IV Push every 6 hours PRN Nausea and/or Vomiting  polyethylene glycol 3350 17 Gram(s) Oral daily PRN Constipation  sodium chloride 0.9% lock flush 10 milliLiter(s) IV Push every 1 hour PRN Pre/post blood products, medications, blood draw, and to maintain line patency      Physical Exam    Neuro :  no focal deficits  Respiratory: CTA B/L  CV: RRR, S1S2, no murmurs,   Abdominal: Soft, NT, ND +BS, gastrostomy tube inplace  Extremities: edema of extrem, + peripheral pulses      ASSESSMENT      Hypoxemia 2nd to covid pna   transaminitis  prediabetes  h/o appendectomy  cholecystectomy        PLAN    cont cont precautions  covid 5/14/21 neg noted above  d/c remdesevir given covid ab positive noted   completed dexamethasone   started pulse steroids for 3 days - 250mg solumedrol bid now tapered off 4/14/21   prednisone 20 daily d/c 6/1/21   cont on bactrim empirically, TIW   cont asa, vit c,    cont albuterol inhaler   pulm f/u  procalcitonin, D-dimer, crp, ldh, ferritin, lactate noted ,    cont tylenol prn,   cont robitussin prn   pt off precedex    pt on methadone   pain mgmt eval noted   off phenylephrine drip for hypotension  clonidine held 2nd to low bp  s/p intubation 3/29/21   s/p tracheostomy 4/21/21  O2 sat 100% (97% - 100%)mv 40%  O2 via mech vent   cont wean as tolerated   vent mgmt as per scu  thoracic surg f/u   s/p L chest tube replacement 4/18/21 for recurrence of left pneumothorax   cxr 4/20/21 with Unchanged advanced infiltrates and catheter left chest tube noted   CXR 4/11/21 with : No interval change compared to one day prior. No pneumothorax noted.   unable to flush or aspirate tube fully, noted debris in tubing which was milked out.   Once material removed from tubing able to aspirated and flush fully. Also changed dressing on pigtail which appears to be in good position.   Monitor O2 status   s/p tracheostomy 4/21/21   cxr 4/21/21 with No evidence of active chest disease. Tracheostomy tube in place otherwise no significant change noted   cxr 4/25/21 with A 40% LEFT pneumothorax. LEFT multi-sidehole pigtail catheter overlies LEFT lower hemithorax.. Bilateral multifocal and diffuse ill-defined airspace opacities..  Follow-up AP portable chest radiograph 4/25/2021 AT 8:58 AM: Residual LEFT 30% upper zone pneumothorax. Otherwise no interval change.noted    s/p 2nd chest tube 5/5/21  cxr 5/6/21 with Tracheostomy and catheter left chest tubes remain. , There are significant diffuse advanced infiltrates again noted. No pneumothorax. Above findings are similar to study earlier in the day. Present film shows a left jugular line inserted   with tip entering the superior vena cava noted   cxr 5/30/21 with Tracheostomy cannula left chest catheter reidentified in position. No change small simple left apical pneumothorax. No change bilateral airspace opacities. Trace left pleural effusion suggested noted   cxr 5/31/21 with Status post tracheostomy. Status post left chest pigtail catheter. Trace left pneumothorax is unchanged. Cardiomegaly. Moderate to severe multifocal patchy opacificationof both lungs, left greater than right, is unchanged. Nonobstructive bowel gas pattern. No dilated loops of small or large bowel noted   s/p removal of L pigtail.   CXR 6/1/21 post l pig tail removal with Left pigtail catheter seen earlier in the day has been removed. Persistent amorphous infiltrate at the site is seen. No pneumothorax noted above.  No acute thoracic surgical intervention at this time,  s/p surgical placement of gastrostomy tube 4/23/2021.   abd xray 5/10/21 with ileus noted   dressing changed daily for seroma   abd xray 5/21/21 with Decreased bowel ileus compared to prior 5/20/2021 exam noted above.   abd xray 5/22/21 with No dilated loops of bowel noted above.   cont tube feeding   CT scan of the chest 5/13/21 demonstrates diffuse bilateral infiltrates with a region of consolidation at the left lung base. Small left pneumothorax. 2 left chest tubes noted in place noted above.  CT scan of the abdomen and pelvis 5/13/21 demonstrates diffuse dilatation of small and large bowel loops most consistent with ileus noted   xray abd with  5/14/21 with Ingested contrast within nonobstructed large bowel to the level of rectum. No acute radiographic intra-abdominal findings noted   ct abd-pelv 5/27/21 with Delayed nephrographic phase enhancement of the nonobstructed bilateral kidneys, suggestive of acute renal insufficiency. No evidence of bowel obstruction. Proctitis noted above.   id f/u   patient completed course of Meropenem  leukocytosis resolved  sputum cx with Enterobacter aerogenes (Carbapenem Resistant) noted above   afebrile  Completed  meropenem, s/p 1 dose of Vancomycin   completed zosyn  lispro ss   s/p 4 units prbc for anemia  f/u h/h    transfuse prbc as needed  heme onc f/u  retic is elevated.    Haptoglobin normal  ? daily phlebotomy  no hemolysis, Fe/B12/folate adequate  hemolysis is unlikely even his baseline haptoglobin may be very elevated due to COVID  direct pamella neg noted    psych f/u/  C/w Klonopin to 1 mg q8h standing (no PRNs), with plan to further taper as tolerated  cont Seroquel to 50 mg BID standing starting tonight  Continue delirium precautions: Frequent reorientation, familiar pictures and objects at bedside, natural light in daytime,   consistent sleep/wake schedule, adequate hydration and nutrition, sensory aids (hearing aids, glasses) present if needed, minimizing noise and overstimulation,   clustering care to minimize overnight interruptions, judicious use of deliriogenic medications (anticholinergics, benzodiazepines and opioid analgesics), minimize use of restraints  phys tx eval  cont current meds   mgmt as per scu

## 2021-06-02 NOTE — PROGRESS NOTE ADULT - PROBLEM SELECTOR PLAN 4
S/p 4 PRBC total  - Now Hg stable > 8  CTH and CT abdomen w/o contrast no evidence of bleed    No active signs of bleeding (No hematemesis, melena or hematuria)  Hemolysis w/u- negative  Iron studies show ACD  Dr Andrade on board   F/u CBC daily S/p 4 PRBC total    Hg stable > 8  CTH and CT abdomen w/o contrast no evidence of bleed    No active signs of bleeding (No hematemesis, melena or hematuria)  Hemolysis w/u- negative  Iron studies show ACD  Monitor CBC .   Transfuse Hgb <8  Dr Andrade on board

## 2021-06-02 NOTE — PROGRESS NOTE ADULT - SUBJECTIVE AND OBJECTIVE BOX
ARANZA CHAMBERS    SCU progress note    INTERVAL HPI/OVERNIGHT EVENTS: ***s/p Left CT removal by Surgery Team yesterday. CXR uneventful s/p left CT removal. Pt seen and examined this morning.  # [id number]. Pt reports feeling okay, no SOB, no chest discomfort, no N/V/abdominal discomfort. Pt reports "want to talk...want to eat..." Discussed plan of care. Trach to vent. NAD. Now on PS 15/PEEP 5/FiO2 40%, tolerating so far.     DNR [ ]   DNI  [  ] Full code.     Covid - 19 PCR: negative 6/1    The 4Ms    What Matters Most: see GOC  Age appropriate Medications/Screen for High Risk Medication: Yes  Mentation: see CAM below  Mobility: defer to physical exam    The Confusion Assessment Method (CAM) Diagnostic Algorithm     1: Acute Onset or Fluctuating Course  - Is there evidence of an acute change in mental status from the patient’s baseline? Did the (abnormal) behavior  fluctuate during the day, that is, tend to come and go, or increase and decrease in severity?  [ ] YES [x ] NO     2: Inattention  - Did the patient have difficulty focusing attention, being easily distractible, or having difficulty keeping track of what was being said?  [ ] YES [x ] NO     3: Disorganized thinking  -Was the patient’s thinking disorganized or incoherent, such as rambling or irrelevant conversation, unclear or illogical flow of ideas, or unpredictable switching from subject to subject?  [ ] YES [ x] NO    4: Altered Level of consciousness?  [ ] YES [x ] NO    The diagnosis of delirium by CAM requires the presence of features 1 and 2 and either 3 or 4.    PRESSORS: [ ] YES [x ] NO  trimethoprim  40 mG/sulfamethoxazole 200 mG Suspension 160 milliGRAM(s) Oral <User Schedule>    Cardiovascular:      labetalol 100 milliGRAM(s) Enteral Tube two times a day    Pulmonary:  ALBUTerol    90 MICROgram(s) HFA Inhaler 2 Puff(s) Inhalation every 6 hours PRN    Hematalogic:  enoxaparin Injectable 40 milliGRAM(s) SubCutaneous every 24 hours    Other:  acetaminophen    Suspension .. 650 milliGRAM(s) Enteral Tube every 12 hours PRN  artificial  tears Solution 1 Drop(s) Both EYES every 4 hours PRN  ascorbic acid 1000 milliGRAM(s) Oral daily  BACItracin   Ointment 1 Application(s) Topical two times a day  bisacodyl Suppository 10 milliGRAM(s) Rectal daily PRN  calcium carbonate 1250 mG  + Vitamin D (OsCal 500 + D) 1 Tablet(s) Oral daily  chlorhexidine 2% Cloths 1 Application(s) Topical <User Schedule>  cholecalciferol 1000 Unit(s) Oral daily  clonazePAM  Tablet 0.5 milliGRAM(s) Oral every 8 hours  gabapentin 100 milliGRAM(s) Oral every 8 hours  methadone    Tablet 5 milliGRAM(s) Oral daily  naloxegol 12.5 milliGRAM(s) Oral daily  ondansetron Injectable 4 milliGRAM(s) IV Push every 6 hours PRN  pantoprazole  Injectable 40 milliGRAM(s) IV Push daily  polyethylene glycol 3350 17 Gram(s) Oral daily PRN  predniSONE   Tablet 15 milliGRAM(s) Oral daily  QUEtiapine 50 milliGRAM(s) Oral two times a day  senna 2 Tablet(s) Oral at bedtime  sodium chloride 0.9% lock flush 10 milliLiter(s) IV Push every 1 hour PRN    acetaminophen    Suspension .. 650 milliGRAM(s) Enteral Tube every 12 hours PRN  ALBUTerol    90 MICROgram(s) HFA Inhaler 2 Puff(s) Inhalation every 6 hours PRN  artificial  tears Solution 1 Drop(s) Both EYES every 4 hours PRN  ascorbic acid 1000 milliGRAM(s) Oral daily  BACItracin   Ointment 1 Application(s) Topical two times a day  bisacodyl Suppository 10 milliGRAM(s) Rectal daily PRN  calcium carbonate 1250 mG  + Vitamin D (OsCal 500 + D) 1 Tablet(s) Oral daily  chlorhexidine 2% Cloths 1 Application(s) Topical <User Schedule>  cholecalciferol 1000 Unit(s) Oral daily  clonazePAM  Tablet 0.5 milliGRAM(s) Oral every 8 hours  enoxaparin Injectable 40 milliGRAM(s) SubCutaneous every 24 hours  gabapentin 100 milliGRAM(s) Oral every 8 hours  labetalol 100 milliGRAM(s) Enteral Tube two times a day  methadone    Tablet 5 milliGRAM(s) Oral daily  naloxegol 12.5 milliGRAM(s) Oral daily  ondansetron Injectable 4 milliGRAM(s) IV Push every 6 hours PRN  pantoprazole  Injectable 40 milliGRAM(s) IV Push daily  polyethylene glycol 3350 17 Gram(s) Oral daily PRN  predniSONE   Tablet 15 milliGRAM(s) Oral daily  QUEtiapine 50 milliGRAM(s) Oral two times a day  senna 2 Tablet(s) Oral at bedtime  sodium chloride 0.9% lock flush 10 milliLiter(s) IV Push every 1 hour PRN  trimethoprim  40 mG/sulfamethoxazole 200 mG Suspension 160 milliGRAM(s) Oral <User Schedule>    Drug Dosing Weight  Height (cm): 167.6 (23 Apr 2021 23:15)  Weight (kg): 83.9 (23 Apr 2021 23:15)  BMI (kg/m2): 29.9 (23 Apr 2021 23:15)  BSA (m2): 1.93 (23 Apr 2021 23:15)    CENTRAL LINE: [ ] YES [x ] NO  LOCATION:   DATE INSERTED:  REMOVE: [ ] YES [ ] NO  EXPLAIN:    RIVERA: [ ] YES [x ] NO    DATE INSERTED:  REMOVE:  [ ] YES [ ] NO  EXPLAIN:    PAST MEDICAL & SURGICAL HISTORY:  No pertinent past medical history    S/P moody  1990s    S/P appendectomy  1990s 06-01 @ 07:01  -  06-02 @ 07:00  --------------------------------------------------------  IN: 120 mL / OUT: 0 mL / NET: 120 mL        Mode: CPAP with PS  FiO2: 40  PEEP: 5  PS: 10      PHYSICAL EXAM:    GENERAL: NAD  HEAD:  Atraumatic, Normocephalic  EYES: EOMI, PERRLA, conjunctiva and sclera clear  ENMT: No tonsillar erythema, exudates, or enlargement; Moist mucous membranes, Good dentition, No lesions  NECK: Trach intact. Supple, No JVD,   NERVOUS SYSTEM:  Awake/Alert & Oriented 2-3,. Mouths words and writes to communicate.  Motor Strength 5/5 B/L upper and lower extremities;  CHEST/LUNG: Trach to vent. Decreased breath sounds. Fine bibasilar rales.  HEART: Regular rate and rhythm; No murmurs, rubs, or gallops  ABDOMEN: Soft, Nontender, Nondistended; Bowel sounds present. PEG intact.   EXTREMITIES:  2+ Peripheral Pulses, No clubbing, cyanosis, or edema  LYMPH: No lymphadenopathy noted  SKIN:      LABS:  CBC Full  -  ( 02 Jun 2021 08:23 )  WBC Count : 7.03 K/uL  RBC Count : 2.53 M/uL  Hemoglobin : 9.3 g/dL  Hematocrit : 26.3 %  Platelet Count - Automated : 228 K/uL  Mean Cell Volume : 104.0 fl  Mean Cell Hemoglobin : 36.8 pg  Mean Cell Hemoglobin Concentration : 35.4 gm/dL  Auto Neutrophil # : x  Auto Lymphocyte # : x  Auto Monocyte # : x  Auto Eosinophil # : x  Auto Basophil # : x  Auto Neutrophil % : x  Auto Lymphocyte % : x  Auto Monocyte % : x  Auto Eosinophil % : x  Auto Basophil % : x    06-02    140  |  100  |  16  ----------------------------<  97  3.8   |  31  |  0.90    Ca    8.9      02 Jun 2021 08:23  Phos  2.9     06-02  Mg     1.8     06-02    TPro  6.3  /  Alb  2.8<L>  /  TBili  0.6  /  DBili  x   /  AST  20  /  ALT  33  /  AlkPhos  70  06-02              [  ]  DVT Prophylaxis  [  ]  Abnormal Nutritional Status -  Malnutrition   Cachexia        Diet, NPO with Tube Feed:   Tube Feeding Modality: Gastrostomy  Vital AF 1.2 Leonardo  Total Volume for 24 Hours (mL): 240  Continuous  Starting Tube Feed Rate mL per Hour: 10  Until Goal Tube Feed Rate (mL per Hour): 10  Tube Feed Duration (in Hours): 24  Tube Feed Start Time: 10:00 (05-28-21 @ 09:34) [Active]        RADIOLOGY & ADDITIONAL STUDIES:  ***    Goals of Care Discussion with Family/Proxy/Other   - see note from 4/14   ARANZA CHAMBERS    SCU progress note    INTERVAL HPI/OVERNIGHT EVENTS: ***s/p Left CT removal by Surgery Team yesterday. CXR uneventful s/p left CT removal. Pt seen and examined this morning.  # [id number]. Pt reports feeling okay, no SOB, no chest discomfort, no N/V/abdominal discomfort. Pt reports "want to talk...want to eat..." Discussed plan of care. Trach to vent. NAD. Now on PS 15/PEEP 5/FiO2 40%, tolerating so far.     DNR [ ]   DNI  [  ] Full code.     Covid - 19 PCR: negative 6/1    The 4Ms    What Matters Most: see GOC  Age appropriate Medications/Screen for High Risk Medication: Yes  Mentation: see CAM below  Mobility: defer to physical exam    The Confusion Assessment Method (CAM) Diagnostic Algorithm     1: Acute Onset or Fluctuating Course  - Is there evidence of an acute change in mental status from the patient’s baseline? Did the (abnormal) behavior  fluctuate during the day, that is, tend to come and go, or increase and decrease in severity?  [ ] YES [x ] NO     2: Inattention  - Did the patient have difficulty focusing attention, being easily distractible, or having difficulty keeping track of what was being said?  [ ] YES [x ] NO     3: Disorganized thinking  -Was the patient’s thinking disorganized or incoherent, such as rambling or irrelevant conversation, unclear or illogical flow of ideas, or unpredictable switching from subject to subject?  [ ] YES [ x] NO    4: Altered Level of consciousness?  [ ] YES [x ] NO    The diagnosis of delirium by CAM requires the presence of features 1 and 2 and either 3 or 4.    PRESSORS: [ ] YES [x ] NO  trimethoprim  40 mG/sulfamethoxazole 200 mG Suspension 160 milliGRAM(s) Oral <User Schedule>    Cardiovascular:  5/8 Echo  EF > 55%; grossly normal left ventricular systolic function Trivial pericardial effusion.`    labetalol 100 milliGRAM(s) Enteral Tube two times a day    Pulmonary:  ALBUTerol    90 MICROgram(s) HFA Inhaler 2 Puff(s) Inhalation every 6 hours PRN    Hematalogic:  enoxaparin Injectable 40 milliGRAM(s) SubCutaneous every 24 hours    Other:  acetaminophen    Suspension .. 650 milliGRAM(s) Enteral Tube every 12 hours PRN  artificial  tears Solution 1 Drop(s) Both EYES every 4 hours PRN  ascorbic acid 1000 milliGRAM(s) Oral daily  BACItracin   Ointment 1 Application(s) Topical two times a day  bisacodyl Suppository 10 milliGRAM(s) Rectal daily PRN  calcium carbonate 1250 mG  + Vitamin D (OsCal 500 + D) 1 Tablet(s) Oral daily  chlorhexidine 2% Cloths 1 Application(s) Topical <User Schedule>  cholecalciferol 1000 Unit(s) Oral daily  clonazePAM  Tablet 0.5 milliGRAM(s) Oral every 8 hours  gabapentin 100 milliGRAM(s) Oral every 8 hours  methadone    Tablet 5 milliGRAM(s) Oral daily  naloxegol 12.5 milliGRAM(s) Oral daily  ondansetron Injectable 4 milliGRAM(s) IV Push every 6 hours PRN  pantoprazole  Injectable 40 milliGRAM(s) IV Push daily  polyethylene glycol 3350 17 Gram(s) Oral daily PRN  predniSONE   Tablet 15 milliGRAM(s) Oral daily  QUEtiapine 50 milliGRAM(s) Oral two times a day  senna 2 Tablet(s) Oral at bedtime  sodium chloride 0.9% lock flush 10 milliLiter(s) IV Push every 1 hour PRN    acetaminophen    Suspension .. 650 milliGRAM(s) Enteral Tube every 12 hours PRN  ALBUTerol    90 MICROgram(s) HFA Inhaler 2 Puff(s) Inhalation every 6 hours PRN  artificial  tears Solution 1 Drop(s) Both EYES every 4 hours PRN  ascorbic acid 1000 milliGRAM(s) Oral daily  BACItracin   Ointment 1 Application(s) Topical two times a day  bisacodyl Suppository 10 milliGRAM(s) Rectal daily PRN  calcium carbonate 1250 mG  + Vitamin D (OsCal 500 + D) 1 Tablet(s) Oral daily  chlorhexidine 2% Cloths 1 Application(s) Topical <User Schedule>  cholecalciferol 1000 Unit(s) Oral daily  clonazePAM  Tablet 0.5 milliGRAM(s) Oral every 8 hours  enoxaparin Injectable 40 milliGRAM(s) SubCutaneous every 24 hours  gabapentin 100 milliGRAM(s) Oral every 8 hours  labetalol 100 milliGRAM(s) Enteral Tube two times a day  methadone    Tablet 5 milliGRAM(s) Oral daily  naloxegol 12.5 milliGRAM(s) Oral daily  ondansetron Injectable 4 milliGRAM(s) IV Push every 6 hours PRN  pantoprazole  Injectable 40 milliGRAM(s) IV Push daily  polyethylene glycol 3350 17 Gram(s) Oral daily PRN  predniSONE   Tablet 15 milliGRAM(s) Oral daily  QUEtiapine 50 milliGRAM(s) Oral two times a day  senna 2 Tablet(s) Oral at bedtime  sodium chloride 0.9% lock flush 10 milliLiter(s) IV Push every 1 hour PRN  trimethoprim  40 mG/sulfamethoxazole 200 mG Suspension 160 milliGRAM(s) Oral <User Schedule>    Drug Dosing Weight  Height (cm): 167.6 (23 Apr 2021 23:15)  Weight (kg): 83.9 (23 Apr 2021 23:15)  BMI (kg/m2): 29.9 (23 Apr 2021 23:15)  BSA (m2): 1.93 (23 Apr 2021 23:15)    CENTRAL LINE: [ ] YES [x ] NO  LOCATION:   DATE INSERTED:  REMOVE: [ ] YES [ ] NO  EXPLAIN:    MIGUEL: [ ] YES [x ] NO    DATE INSERTED:  REMOVE:  [ ] YES [ ] NO  EXPLAIN:    PAST MEDICAL & SURGICAL HISTORY:  No pertinent past medical history    S/P moody  1990s    S/P appendectomy  1990s 06-01 @ 07:01  -  06-02 @ 07:00  --------------------------------------------------------  IN: 120 mL / OUT: 0 mL / NET: 120 mL        Mode: CPAP with PS  FiO2: 40  PEEP: 5  PS: 10      PHYSICAL EXAM:    GENERAL: NAD  HEAD:  Atraumatic, Normocephalic  EYES: EOMI, PERRLA, conjunctiva and sclera clear  ENMT: No tonsillar erythema, exudates, or enlargement; Moist mucous membranes, Good dentition, No lesions  NECK: Trach intact. Supple, No JVD,   NERVOUS SYSTEM:  Awake/Alert & Oriented 2-3,. Mouths words and writes to communicate.  Motor Strength 5/5 B/L upper and lower extremities;  CHEST/LUNG: Trach to vent. Decreased breath sounds. Fine bibasilar rales.  HEART: Regular rate and rhythm; No murmurs, rubs, or gallops  ABDOMEN: Soft, Nontender, Nondistended; Bowel sounds present. PEG intact.   EXTREMITIES:  2+ Peripheral Pulses, No clubbing, cyanosis, or edema  LYMPH: No lymphadenopathy noted  SKIN: `posterior left hand open wound . DTI bilat buttocks. Open wound mid-abd.        LABS:  CBC Full  -  ( 02 Jun 2021 08:23 )  WBC Count : 7.03 K/uL  RBC Count : 2.53 M/uL  Hemoglobin : 9.3 g/dL  Hematocrit : 26.3 %  Platelet Count - Automated : 228 K/uL  Mean Cell Volume : 104.0 fl  Mean Cell Hemoglobin : 36.8 pg  Mean Cell Hemoglobin Concentration : 35.4 gm/dL  Auto Neutrophil # : x  Auto Lymphocyte # : x  Auto Monocyte # : x  Auto Eosinophil # : x  Auto Basophil # : x  Auto Neutrophil % : x  Auto Lymphocyte % : x  Auto Monocyte % : x  Auto Eosinophil % : x  Auto Basophil % : x    06-02    140  |  100  |  16  ----------------------------<  97  3.8   |  31  |  0.90    Ca    8.9      02 Jun 2021 08:23  Phos  2.9     06-02  Mg     1.8     06-02    TPro  6.3  /  Alb  2.8<L>  /  TBili  0.6  /  DBili  x   /  AST  20  /  ALT  33  /  AlkPhos  70  06-02              [  ]  DVT Prophylaxis  [  ]  Abnormal Nutritional Status -  Malnutrition   Cachexia        Diet, NPO with Tube Feed:   Tube Feeding Modality: Gastrostomy  Vital AF 1.2 Leonardo  Total Volume for 24 Hours (mL): 240  Continuous  Starting Tube Feed Rate mL per Hour: 10  Until Goal Tube Feed Rate (mL per Hour): 10  Tube Feed Duration (in Hours): 24  Tube Feed Start Time: 10:00 (05-28-21 @ 09:34) [Active]        RADIOLOGY & ADDITIONAL STUDIES:  ***    < from: Xray Chest 1 View- PORTABLE-Urgent (Xray Chest 1 View- PORTABLE-Urgent .) (06.01.21 @ 14:29) >  Heart magnified by technique. Tracheostomy remains.    Left pigtail catheter seen earlier in the day has been removed.    Persistent amorphous infiltrate at the site is seen. No pneumothorax.    IMPRESSION: Uneventful removal of left chest tube.    < end of copied text >      Goals of Care Discussion with Family/Proxy/Other   - see note from 4/14

## 2021-06-02 NOTE — PROGRESS NOTE ADULT - PROBLEM SELECTOR PLAN 8
Continue PEG feedings and supplements   Nutrition consult PEG feedings , rate increased to 30ml/hr for goal of 50ml/hr   Continue supplements   Nutrition consult appreciated.

## 2021-06-02 NOTE — PROGRESS NOTE ADULT - ASSESSMENT
55M, no PMH, PSH appy and moody 1990s presented 3/14 with x9 days worsening cough, subjective fevers, and SOB, with x2-3 days dysuria and central, non-radiating, constant CP, admitted to [facility] for management of COVID PNA. Given discomfort of breathing, placed on 4L NC, with improvement. Labs notable for lymphopenia and neutrophilia despite WBC wnl, d-dimer 423, AST//114, lactate 3.3, and . Trop negative x1. +COVID. CXR notable for b/l infiltrates, L>R. Given x1 dose Tylenol and 1L IVF bolus in ED. Initially started on Remdesivir and Dexamethasone, Remdesivir was later discontinued secondary to positive antibodies and elevated LFT's. Not a  candidate for Tociluzimab due to elevated LFT. Patient continued to have respiratory distress requiring 15 L NRB support.    3/19/2021- Patient got transferred to ICU  due to increased work of breathing despite being on 15 L NRB . Patient was on HFNC to keep saturation >90%. Chest X ray showed Grossly stable mild left apical pneumothorax. Stable small pneumomediastinum. Soft tissue emphysema at the neck bases again noted. Stable patchy bilateral pulmonary infiltrates. Thoracic surgery consulted, no intervention at this time.  3/22/2021 - Chest X ray revealed trace ptx right and mild left pneumothorax  3/24/2021 - Overnight pt saturation remains in early 90s and late 80s . Pt remains on HFNC. CXR stable  3/25/2021- Chest X ray showed Overlying chin obscures lung apices. Questionable tiny left apical pneumothorax. Trial of semi- pulse steroid .. solumedrol 250 bid x 3 days   3/28- Started on solumedrol 40 mg bid  3/29/2021 - At 5.30 am, pt was in severe respiratory distress with tachypneic in 40s, tachycardic in 140s, saturating at high 70s, so decision was made to sedate and intubate the pt. Anesthesiologist intubated the patient. A central line, NGT  was placed  4/1/2021- Palliative care consulted. patient's brother/Idalgo ([phone]) has agreed to act as surrogate. Prednisone tapered to  once daily   4/6/2021- Left TLC placed, Hypertensive - s/p Lopressor 5 mg X 1  4/8/2021- Lasix daily and HCTz 50 mg added to aim for negative fluid balance, Left Chest tube placed, added Albumin (4/8-4/10)  4/11/2021-patient was having fever 100.8 rectal, s/p Tylenol , s/p 1 dose of vancomycin, on zosyn - New infiltrate on CXR ,likely developing VAP  4/13/2021- Continue to spike fevers, Zosyn switched to Meropenem. ID- Dr Anand following, new TLC placed  4/15/2021- Chest tuber discontinued   4/18/2021- BP running on lower side, Started on phenylephrine, Ibuprofen D/Connor , FiO2 increased to 70%Patient had large pneumothorax note don Left, thoracic surgery was consulted ,  left chest tube placed.  4/20/2021- Patient received 1 dose of Epifanio for vent asynchrony, A line placed, vent settings changed fro mPC to CVVC     4/21/2021- Trach placed  4/23/2021- PEG tube placed  4/25/2021 - cxr 4/25/21 with A 40% LEFT pneumothorax. LEFT multi-sidehole pigtail catheter overlies LEFT lower hemithorax.. Bilateral multifocal and diffuse ill-defined airspace opacities..  Follow-up AP portable chest radiograph 4/25/2021 AT 8:58 AM: Residual LEFT 30% upper zone pneumothorax.  4/26/2021- Patient noted to have hemoglobin drop to 6.8 from 7.5, will transfuse one unit of PRBC and monitor CBC.   4/27/2021- Patient's hemoglobin dropped again to 7, will give two unit of PRBC  4/28/2021- On 4/28 Hb 6.8, s/p 1 unit of PRBC hb 7.5 now. 4 PRBC total  CTH and CT abdomen w/o contrast no evidence of bleed  No active signs of bleeding (No hematemesis, melena or hematuria)  F/u hemolysis w/u- negative so far, elevated reticulocytes . Dr Andrade consulted  5/2/2021-Patients Hb remained stable, was tachypneic and breathing over the vent so 1 mg push of Dilaudid X 2 was given. V: 20/400/60%/3+ on precedex drip at 1.5.   5/3/2021- patient was found to have large gastric bubble, G tube was connected to wall suction, repeat CXR showed improvement.  5/5/2021 - Hemoglobin dropped from 8.8 to 7.4.Will add bactrim empirically as patient is on chronic prednisone for organizing pneumonia. Tube feeds were held because of diarrhea  5/6/2021- CT chest was done, which showed mild PTX. Left 3rd intercostal space Chest tube was placed. Patient was hypotensive overnight. Cristiano temperature. Was septic, will send cultures if spikes temperature again. Patient was retaining urine, andrea was placed overnight. Will start on albumin and give one dose of lasix as patient was hypotensive and edematous in upper extremities, also had low albumin. Patient was getting agitated, will give ketamine IV push and monitor if it helps.  5/10/2021- Pt tachycardic overnight and hypertensive to 228/116 w/p 1x lopressor. Stopped levophed. Pt became hypotensive. Then started phenylephrine. BP now stable. Will d/c methadone and seroquel and continue on precedex for sedation. CXR showing apical pneumothorax   5/13/2021- Pt tachycardic to 160-170 yesterday afternoon and night. Pupils dilated. Did not respond to increased doses of opiates and benzos. Developed fever to 102.3 overnight with episode of vomiting. UA was positive. started empirically on vanc/zosyn - will d/c for now as no clear source of infection. AM abdominal xray w/ likely ileus. Will restart Free water. Hold Lasix. CT abdomen done showing ileus and possible fecal matter in rectum. Fecal disimpaction performed, small ball of stool retrieved with liquid stool excreted subsequently. Spoke to Surgery RUBI Wood. Tube feeds stopped and PEG tube placed to suction with no output.. Also will restart Precedex as likely in withdrawal and clonid  5/18/2021- Patient was tachy and agitated. Later he had hypotension and bradycardia. He was given 1L bolus and was restarted on pheny after which he improved  5/25- Behavioral health consulted due to difficultyn weaning off sedation . Recommended C/w Klonopin to 1 mg q8h standing (no PRNs), with plan to further taper as tolerated. C/w Seroquel 50 mg BID standing  5/26/2021- BP was elevated, was started on labetalol. d/c andrea.  CT placed on water seal, c/w water seal  5/27/2021- Vomiting X2, held feeding, CT Abdomen/Pelvis ordered  5/28/2021- Placed on trach collar today  5/30/2021- Patient was on trach collar throughout the day yesterday and placed back on vent overnight. No other acute events overnight.    5/31/2021- Decreased Methadone to 5 mg daily and Klonopin to 0.5 mg every 8 hours. Plan was made to transfer patient to SCU  6/1 Abd discomfort. AXR results as above, no bowel obstruction. Resume PEG feedings.   6/1 TC trial. Pt tolerated only 10 minutes, RR ~30's and anxious.  Will attempt SBT with PS.

## 2021-06-02 NOTE — PROGRESS NOTE ADULT - PROBLEM SELECTOR PLAN 10
S/p trach /PEG  SBT with PS during the day, Full  mechanical ventilatory at night.  Will require vent facility  F/u with Thoracic surgery re: Left CT removal   Daily PT   F/u repeat COVID PCR S/p trach /PEG  SBT with PS during the day, Full  mechanical ventilatory at night.  Will require vent facility  Daily PT   SW following

## 2021-06-02 NOTE — CHART NOTE - NSCHARTNOTEFT_GEN_A_CORE
# [id number]  Pt tolerated ~8 hrs today on CPAP with PS10 .  Met with family at bedside , sister-in-law ( is pt's brother Alec Lau [phone]). Updated on pt's clinical condition and plan of care, discharge planning to vent facility.  Sister-in-law requests for SW to call pt's brother Alec at above number. Alec and Brennan are both involved with pt's decision-making.     All questions and concerns addressed.     Jocy Whelan NP Medicine  # [id number]  Pt tolerated ~8 hrs today on CPAP with PS10 .  Met with family at bedside , sister-in-law ( is pt's brother Alec Lau [phone]). Updated on pt's clinical condition and plan of care, discharge planning to vent facility.  Sister-in-law requests for SW to call pt's brother Alec at above number. Pt agrees that Alec and Brennan are both involved with pt's decision-making.     All questions and concerns addressed.     Jocy Whelan NP Medicine

## 2021-06-02 NOTE — PROGRESS NOTE ADULT - PROBLEM SELECTOR PLAN 3
Abd xray results as above  Continue PEG tube feedings  Continue with bowel regimen  Continue Naloxegol  Continue with Zofran prn  Serial abdominal exam  Monitor PEG residual Improved.   Abd xray results as above  Continue PEG tube feedings  Continue with bowel regimen  Continue Naloxegol  Continue with Zofran prn  Serial abdominal exam  Monitor PEG residual

## 2021-06-02 NOTE — PROGRESS NOTE ADULT - PROBLEM SELECTOR PLAN 6
Delirium due to COVID  infection, PNA,  metabolic derangement, benzo withdrawal, prolonged hospital stay  Improving  Continue Klonopin 0.5mg q8h, Seroquel 50mg BID  Taper Methadone tomorrow  Supportive measures Delirium due to COVID  infection, PNA,  metabolic derangement, benzo withdrawal, prolonged hospital stay  Improving  Continue Klonopin 0.5mg  ---->decreased to q12h.   Continue Seroquel 50mg BID,   Methadone  Supportive measures  Psyche following

## 2021-06-02 NOTE — PROGRESS NOTE ADULT - ATTENDING COMMENTS
Comfortable post chest tube removal  Cont. daily weaning trials  PT follow up  Discharge planning to vent capable facility

## 2021-06-02 NOTE — PROGRESS NOTE ADULT - PROBLEM SELECTOR PLAN 1
S/p Dexamethasone   No Remdesivir  due to positive antibodies   Covid19 PCR negative now, off isolation   CXR results as above  Continue Bactrim for empiric coverage   Continue with prednisone taper  Continue inhalers  Continue ventilatory support.   Tolerated 10 minutes of Trach collar yesterday  Will continue SBT with PS during the day as tolerated.   Full vent support overnight  Monitor oxygenation

## 2021-06-02 NOTE — PROGRESS NOTE ADULT - SUBJECTIVE AND OBJECTIVE BOX
Pt is awake, alert, lying in bed in NAD.     INTERVAL HPI/OVERNIGHT EVENTS:      VITAL SIGNS:  T(F): 98.3 (06-02-21 @ 05:15)  HR: 95 (06-02-21 @ 08:36)  BP: 135/79 (06-02-21 @ 05:15)  RR: 20 (06-02-21 @ 05:15)  SpO2: 98% (06-02-21 @ 08:36)  Wt(kg): --  I&O's Detail    01 Jun 2021 07:01  -  02 Jun 2021 07:00  --------------------------------------------------------  IN:    Vital1.5: 120 mL  Total IN: 120 mL    OUT:  Total OUT: 0 mL    Total NET: 120 mL        Mode: CPAP with PS  FiO2: 40  PEEP: 5  PS: 10        REVIEW OF SYSTEMS:    CONSTITUTIONAL:  No fevers, chills, sweats    HEENT:  Eyes:  No diplopia or blurred vision. ENT:  No earache, sore throat or runny nose.    CARDIOVASCULAR:  No pressure, squeezing, tightness, or heaviness about the chest; no palpitations.    RESPIRATORY:  Per HPI    GASTROINTESTINAL:  No abdominal pain, nausea, vomiting or diarrhea.    GENITOURINARY:  No dysuria, frequency or urgency.    NEUROLOGIC:  No paresthesias, fasciculations, seizures or weakness.    PSYCHIATRIC:  No disorder of thought or mood.      PHYSICAL EXAM:    Constitutional: Well developed and nourished  Eyes:Perrla  ENMT: normal  Neck:supple  Respiratory: good air entry; trach   Cardiovascular: S1 S2 regular  Gastrointestinal: Soft, Non tender; PEG   Extremities: No edema  Vascular:normal  Neurological:Awake, alert,Ox3  Musculoskeletal: weak, bed       MEDICATIONS  (STANDING):  ascorbic acid 1000 milliGRAM(s) Oral daily  BACItracin   Ointment 1 Application(s) Topical two times a day  calcium carbonate 1250 mG  + Vitamin D (OsCal 500 + D) 1 Tablet(s) Oral daily  chlorhexidine 2% Cloths 1 Application(s) Topical <User Schedule>  cholecalciferol 1000 Unit(s) Oral daily  clonazePAM  Tablet 0.5 milliGRAM(s) Oral every 8 hours  enoxaparin Injectable 40 milliGRAM(s) SubCutaneous every 24 hours  gabapentin 100 milliGRAM(s) Oral every 8 hours  labetalol 100 milliGRAM(s) Enteral Tube two times a day  methadone    Tablet 5 milliGRAM(s) Oral daily  naloxegol 12.5 milliGRAM(s) Oral daily  pantoprazole  Injectable 40 milliGRAM(s) IV Push daily  predniSONE   Tablet 15 milliGRAM(s) Oral daily  QUEtiapine 50 milliGRAM(s) Oral two times a day  senna 2 Tablet(s) Oral at bedtime  trimethoprim  40 mG/sulfamethoxazole 200 mG Suspension 160 milliGRAM(s) Oral <User Schedule>    MEDICATIONS  (PRN):  acetaminophen    Suspension .. 650 milliGRAM(s) Enteral Tube every 12 hours PRN Temp greater or equal to 38C (100.4F), Mild Pain (1 - 3)  ALBUTerol    90 MICROgram(s) HFA Inhaler 2 Puff(s) Inhalation every 6 hours PRN Shortness of Breath and/or Wheezing  artificial  tears Solution 1 Drop(s) Both EYES every 4 hours PRN Dry Eyes  bisacodyl Suppository 10 milliGRAM(s) Rectal daily PRN Constipation  ondansetron Injectable 4 milliGRAM(s) IV Push every 6 hours PRN Nausea and/or Vomiting  polyethylene glycol 3350 17 Gram(s) Oral daily PRN Constipation  sodium chloride 0.9% lock flush 10 milliLiter(s) IV Push every 1 hour PRN Pre/post blood products, medications, blood draw, and to maintain line patency      Allergies    No Known Allergies    Intolerances        LABS:                        9.3    7.03  )-----------( 228      ( 02 Jun 2021 08:23 )             26.3     06-02    140  |  100  |  16  ----------------------------<  97  3.8   |  31  |  0.90    Ca    8.9      02 Jun 2021 08:23  Phos  2.9     06-02  Mg     1.8     06-02    TPro  6.3  /  Alb  2.8<L>  /  TBili  0.6  /  DBili  x   /  AST  20  /  ALT  33  /  AlkPhos  70  06-02              CAPILLARY BLOOD GLUCOSE      POCT Blood Glucose.: 87 mg/dL (02 Jun 2021 06:07)  POCT Blood Glucose.: 76 mg/dL (02 Jun 2021 00:12)  POCT Blood Glucose.: 102 mg/dL (01 Jun 2021 17:16)  POCT Blood Glucose.: 108 mg/dL (01 Jun 2021 12:46)        RADIOLOGY & ADDITIONAL TESTS:    CXR:  IMPRESSION: Uneventful removal of left chest tube.    Ct scan chest:    ekg;    echo:

## 2021-06-02 NOTE — CHART NOTE - NSCHARTNOTEFT_GEN_A_CORE
Assessment:   55yMalePatient is a 55y old  Male who presents with a chief complaint of SOB (02 Jun 2021 10:38) Pt DG FROM ICU TO 4 N ON 5/31. Pt is on contact isolation. Pt visited. TF infusing.   Pt is on vent via trach. On Vital 1.2 @ 10 ml /hr  x 24 hr providing 240 ml 288 calories. Protein 18 gm, free water 195 ml /day.  TF tolerated Pt with Pressure ulcer stage 1 @ Sacrum. Labs noted. Nutrition Consult. Will Suggest Increase Rate of TF gradually to Vital 1.2 @ 50 ml /hr . D/W RN TF TOLERATED . LABS NOTED.        Factors impacting intake: [ ] none [ ] nausea  [ ] vomiting [ ] diarrhea [ ] constipation  [ ]chewing problems [x ] swallowing issues  [ ] other:     Diet Prescription: Diet, NPO with Tube Feed:   Tube Feeding Modality: Gastrostomy  Vital AF 1.2 Leonardo  Total Volume for 24 Hours (mL): 240  Continuous  Starting Tube Feed Rate {mL per Hour}: 10  Until Goal Tube Feed Rate (mL per Hour): 10  Tube Feed Duration (in Hours): 24  Tube Feed Start Time: 10:00 (05-28-21 @ 09:34)    Intake: NPO w TF     Current Weight:   % Weight Change Bed scale 136 lb with out  SCD device.     Pertinent Medications: MEDICATIONS  (STANDING):  ascorbic acid 1000 milliGRAM(s) Oral daily  BACItracin   Ointment 1 Application(s) Topical two times a day  calcium carbonate 1250 mG  + Vitamin D (OsCal 500 + D) 1 Tablet(s) Oral daily  chlorhexidine 2% Cloths 1 Application(s) Topical <User Schedule>  cholecalciferol 1000 Unit(s) Oral daily  clonazePAM  Tablet 0.5 milliGRAM(s) Oral every 8 hours  enoxaparin Injectable 40 milliGRAM(s) SubCutaneous every 24 hours  gabapentin 100 milliGRAM(s) Oral every 8 hours  labetalol 100 milliGRAM(s) Enteral Tube two times a day  methadone    Tablet 5 milliGRAM(s) Oral daily  naloxegol 12.5 milliGRAM(s) Oral daily  pantoprazole  Injectable 40 milliGRAM(s) IV Push daily  predniSONE   Tablet 15 milliGRAM(s) Oral daily  QUEtiapine 50 milliGRAM(s) Oral two times a day  senna 2 Tablet(s) Oral at bedtime  trimethoprim  40 mG/sulfamethoxazole 200 mG Suspension 160 milliGRAM(s) Oral <User Schedule>    MEDICATIONS  (PRN):  acetaminophen    Suspension .. 650 milliGRAM(s) Enteral Tube every 12 hours PRN Temp greater or equal to 38C (100.4F), Mild Pain (1 - 3)  ALBUTerol    90 MICROgram(s) HFA Inhaler 2 Puff(s) Inhalation every 6 hours PRN Shortness of Breath and/or Wheezing  artificial  tears Solution 1 Drop(s) Both EYES every 4 hours PRN Dry Eyes  bisacodyl Suppository 10 milliGRAM(s) Rectal daily PRN Constipation  ondansetron Injectable 4 milliGRAM(s) IV Push every 6 hours PRN Nausea and/or Vomiting  polyethylene glycol 3350 17 Gram(s) Oral daily PRN Constipation  sodium chloride 0.9% lock flush 10 milliLiter(s) IV Push every 1 hour PRN Pre/post blood products, medications, blood draw, and to maintain line patency    Pertinent Labs: 06-02 Na140 mmol/L Glu 97 mg/dL K+ 3.8 mmol/L Cr  0.90 mg/dL BUN 16 mg/dL 06-02 Phos 2.9 mg/dL 06-02 Alb 2.8 g/dL<L>     CAPILLARY BLOOD GLUCOSE      POCT Blood Glucose.: 87 mg/dL (02 Jun 2021 06:07)  POCT Blood Glucose.: 76 mg/dL (02 Jun 2021 00:12)  POCT Blood Glucose.: 102 mg/dL (01 Jun 2021 17:16)  POCT Blood Glucose.: 108 mg/dL (01 Jun 2021 12:46)    Skin:  Stage 1 @ sacrum    Estimated Needs:   [ ] no change since previous assessment  [ ] recalculated:     Previous Nutrition Diagnosis:   [ ] Inadequate Energy Intake [ ]Inadequate Oral Intake [ ] Excessive Energy Intake   [ ] Underweight [ ] Increased Nutrient Needs [ ] Overweight/Obesity   [ ] Altered GI Function [ ] Unintended Weight Loss [ ] Food & Nutrition Related Knowledge Deficit [x ] Malnutrition severe    Nutrition Diagnosis is [x ] ongoing  [ ] resolved [ ] not applicable     New Nutrition Diagnosis: [ ] not applicable       Interventions:   Recommend  [ ] Change Diet To:  [ ] Nutrition Supplement  [ X] Nutrition Support INCREASE TF GRADUALLY TO 50 ML /HR X24 HR AS TOLERATED   [X ] Other:  D/W RN, NP.    Monitoring and Evaluation:   [ ] PO intake [ x ] Tolerance to diet prescription [ x ] weights [ x ] labs[ x ] follow up per protocol  [ ] other:

## 2021-06-03 LAB
GLUCOSE BLDC GLUCOMTR-MCNC: 110 MG/DL — HIGH (ref 70–99)
GLUCOSE BLDC GLUCOMTR-MCNC: 114 MG/DL — HIGH (ref 70–99)
GLUCOSE BLDC GLUCOMTR-MCNC: 132 MG/DL — HIGH (ref 70–99)
GLUCOSE BLDC GLUCOMTR-MCNC: 150 MG/DL — HIGH (ref 70–99)
GLUCOSE BLDC GLUCOMTR-MCNC: 89 MG/DL — SIGNIFICANT CHANGE UP (ref 70–99)

## 2021-06-03 RX ADMIN — QUETIAPINE FUMARATE 50 MILLIGRAM(S): 200 TABLET, FILM COATED ORAL at 20:53

## 2021-06-03 RX ADMIN — Medication 15 MILLIGRAM(S): at 06:11

## 2021-06-03 RX ADMIN — SENNA PLUS 2 TABLET(S): 8.6 TABLET ORAL at 20:54

## 2021-06-03 RX ADMIN — Medication 100 MILLIGRAM(S): at 17:50

## 2021-06-03 RX ADMIN — Medication 1000 MILLIGRAM(S): at 11:27

## 2021-06-03 RX ADMIN — PANTOPRAZOLE SODIUM 40 MILLIGRAM(S): 20 TABLET, DELAYED RELEASE ORAL at 11:27

## 2021-06-03 RX ADMIN — Medication 1000 UNIT(S): at 11:27

## 2021-06-03 RX ADMIN — GABAPENTIN 100 MILLIGRAM(S): 400 CAPSULE ORAL at 20:54

## 2021-06-03 RX ADMIN — GABAPENTIN 100 MILLIGRAM(S): 400 CAPSULE ORAL at 13:15

## 2021-06-03 RX ADMIN — METHADONE HYDROCHLORIDE 5 MILLIGRAM(S): 40 TABLET ORAL at 11:27

## 2021-06-03 RX ADMIN — ENOXAPARIN SODIUM 40 MILLIGRAM(S): 100 INJECTION SUBCUTANEOUS at 11:27

## 2021-06-03 RX ADMIN — Medication 0.5 MILLIGRAM(S): at 17:49

## 2021-06-03 RX ADMIN — Medication 1 TABLET(S): at 11:27

## 2021-06-03 RX ADMIN — QUETIAPINE FUMARATE 50 MILLIGRAM(S): 200 TABLET, FILM COATED ORAL at 06:10

## 2021-06-03 RX ADMIN — Medication 1 APPLICATION(S): at 17:49

## 2021-06-03 RX ADMIN — GABAPENTIN 100 MILLIGRAM(S): 400 CAPSULE ORAL at 06:09

## 2021-06-03 RX ADMIN — Medication 0.5 MILLIGRAM(S): at 06:11

## 2021-06-03 RX ADMIN — CHLORHEXIDINE GLUCONATE 1 APPLICATION(S): 213 SOLUTION TOPICAL at 06:11

## 2021-06-03 RX ADMIN — Medication 100 MILLIGRAM(S): at 06:12

## 2021-06-03 RX ADMIN — NALOXEGOL OXALATE 12.5 MILLIGRAM(S): 12.5 TABLET, FILM COATED ORAL at 11:27

## 2021-06-03 RX ADMIN — Medication 1 APPLICATION(S): at 06:11

## 2021-06-03 NOTE — PROGRESS NOTE ADULT - PROBLEM SELECTOR PLAN 1
S/p Dexamethasone , No Remdesivir  due to positive antibodies   Covid19 PCR negative now spo far, off isolation   CXR results as above  Continue Bactrim for empiric coverage   Continue with prednisone taper  Continue inhalers  Continue ventilatory support.   Trach collar as tolerated  Will continue SBT with PS during the day as tolerated.   Full vent support overnight  Monitor oxygenation saturation S/p Dexamethasone , No Remdesivir  due to positive antibodies   Covid19 PCR negative now spo far, off isolation   CXR results as above  Continue Bactrim for empiric coverage   Continue with prednisone taper  Continue inhalers  Continue ventilatory support.   Will continue SBT with PS during the day as tolerated. Only tolerated 10 minutes. After 10 minutes became tachypneic.  Full vent support overnight  Monitor oxygenation saturation

## 2021-06-03 NOTE — PROGRESS NOTE ADULT - SUBJECTIVE AND OBJECTIVE BOX
Patient is a 55y old  Male who presents with a chief complaint of SOB (02 Jun 2021 10:38)    pt seen in icu [  ], reg med floor scu [ x  ], bed [ x ], chair at bedside [   ], awake and responsive [ x ], mildly sedated, [  ],    nad [x  ]        Allergies    No Known Allergies        Vitals    T(F): 98 (06-03-21 @ 05:56), Max: 98.8 (06-02-21 @ 15:09)  HR: 83 (06-03-21 @ 05:56) (66 - 95)  BP: 128/79 (06-03-21 @ 05:56) (128/79 - 144/85)  RR: 20 (06-03-21 @ 05:56) (20 - 23)  SpO2: 100% (06-03-21 @ 05:56) (97% - 100%)  Wt(kg): --  CAPILLARY BLOOD GLUCOSE      POCT Blood Glucose.: 89 mg/dL (03 Jun 2021 06:05)      Labs                          9.3    7.03  )-----------( 228      ( 02 Jun 2021 08:23 )             26.3       06-02    140  |  100  |  16  ----------------------------<  97  3.8   |  31  |  0.90    Ca    8.9      02 Jun 2021 08:23  Phos  2.9     06-02  Mg     1.8     06-02    TPro  6.3  /  Alb  2.8<L>  /  TBili  0.6  /  DBili  x   /  AST  20  /  ALT  33  /  AlkPhos  70  06-02            .Sputum Sputum  04-16 @ 04:42   Moderate Enterobacter aerogenes (Carbapenem Resistant)  Normal Respiratory Monserrat present  --  Enterobacter aerogenes (Carbapenem Resistant)      .Urine Clean Catch (Midstream)  03-15 @ 00:52   No growth  --  --          Radiology Results      Meds    MEDICATIONS  (STANDING):  ascorbic acid 1000 milliGRAM(s) Oral daily  BACItracin   Ointment 1 Application(s) Topical two times a day  calcium carbonate 1250 mG  + Vitamin D (OsCal 500 + D) 1 Tablet(s) Oral daily  chlorhexidine 2% Cloths 1 Application(s) Topical <User Schedule>  cholecalciferol 1000 Unit(s) Oral daily  clonazePAM  Tablet 0.5 milliGRAM(s) Oral every 12 hours  enoxaparin Injectable 40 milliGRAM(s) SubCutaneous every 24 hours  gabapentin 100 milliGRAM(s) Oral every 8 hours  labetalol 100 milliGRAM(s) Enteral Tube two times a day  methadone    Tablet 5 milliGRAM(s) Oral daily  naloxegol 12.5 milliGRAM(s) Oral daily  pantoprazole  Injectable 40 milliGRAM(s) IV Push daily  predniSONE   Tablet 15 milliGRAM(s) Oral daily  QUEtiapine 50 milliGRAM(s) Oral two times a day  senna 2 Tablet(s) Oral at bedtime  trimethoprim  40 mG/sulfamethoxazole 200 mG Suspension 160 milliGRAM(s) Oral <User Schedule>      MEDICATIONS  (PRN):  acetaminophen    Suspension .. 650 milliGRAM(s) Enteral Tube every 12 hours PRN Temp greater or equal to 38C (100.4F), Mild Pain (1 - 3)  ALBUTerol    90 MICROgram(s) HFA Inhaler 2 Puff(s) Inhalation every 6 hours PRN Shortness of Breath and/or Wheezing  artificial  tears Solution 1 Drop(s) Both EYES every 4 hours PRN Dry Eyes  bisacodyl Suppository 10 milliGRAM(s) Rectal daily PRN Constipation  ondansetron Injectable 4 milliGRAM(s) IV Push every 6 hours PRN Nausea and/or Vomiting  polyethylene glycol 3350 17 Gram(s) Oral daily PRN Constipation  sodium chloride 0.9% lock flush 10 milliLiter(s) IV Push every 1 hour PRN Pre/post blood products, medications, blood draw, and to maintain line patency      Physical Exam    Neuro :  no focal deficits  Respiratory: CTA B/L  CV: RRR, S1S2, no murmurs,   Abdominal: Soft, NT, ND +BS, gastrostomy tube inplace  Extremities: edema of extrem, + peripheral pulses      ASSESSMENT      Hypoxemia 2nd to covid pna   transaminitis  prediabetes  h/o appendectomy  cholecystectomy        PLAN    cont cont precautions  covid 5/14/21 neg noted above  d/c remdesevir given covid ab positive noted   completed dexamethasone   started pulse steroids for 3 days - 250mg solumedrol bid now tapered off 4/14/21   prednisone 20 daily d/c 6/1/21   cont on bactrim empirically, TIW   cont asa, vit c,    cont albuterol inhaler   pulm f/u  procalcitonin, D-dimer, crp, ldh, ferritin, lactate noted ,    cont tylenol prn,   cont robitussin prn   pt off precedex    pt on methadone   pain mgmt eval noted   off phenylephrine drip for hypotension  clonidine held 2nd to low bp  s/p intubation 3/29/21   s/p tracheostomy 4/21/21  O2 sat 100% (97% - 100%)mv 40%  O2 via mech vent   cont wean as tolerated   vent mgmt as per scu  thoracic surg f/u   s/p L chest tube replacement 4/18/21 for recurrence of left pneumothorax   cxr 4/20/21 with Unchanged advanced infiltrates and catheter left chest tube noted   CXR 4/11/21 with : No interval change compared to one day prior. No pneumothorax noted.   unable to flush or aspirate tube fully, noted debris in tubing which was milked out.   Once material removed from tubing able to aspirated and flush fully. Also changed dressing on pigtail which appears to be in good position.   Monitor O2 status   s/p tracheostomy 4/21/21   cxr 4/21/21 with No evidence of active chest disease. Tracheostomy tube in place otherwise no significant change noted   cxr 4/25/21 with A 40% LEFT pneumothorax. LEFT multi-sidehole pigtail catheter overlies LEFT lower hemithorax.. Bilateral multifocal and diffuse ill-defined airspace opacities..  Follow-up AP portable chest radiograph 4/25/2021 AT 8:58 AM: Residual LEFT 30% upper zone pneumothorax. Otherwise no interval change.noted    s/p 2nd chest tube 5/5/21  cxr 5/6/21 with Tracheostomy and catheter left chest tubes remain. , There are significant diffuse advanced infiltrates again noted. No pneumothorax. Above findings are similar to study earlier in the day. Present film shows a left jugular line inserted   with tip entering the superior vena cava noted   cxr 5/30/21 with Tracheostomy cannula left chest catheter reidentified in position. No change small simple left apical pneumothorax. No change bilateral airspace opacities. Trace left pleural effusion suggested noted   cxr 5/31/21 with Status post tracheostomy. Status post left chest pigtail catheter. Trace left pneumothorax is unchanged. Cardiomegaly. Moderate to severe multifocal patchy opacificationof both lungs, left greater than right, is unchanged. Nonobstructive bowel gas pattern. No dilated loops of small or large bowel noted   s/p removal of L pigtail.   CXR 6/1/21 post l pig tail removal with Left pigtail catheter seen earlier in the day has been removed. Persistent amorphous infiltrate at the site is seen. No pneumothorax noted above.  No acute thoracic surgical intervention at this time,  s/p surgical placement of gastrostomy tube 4/23/2021.   abd xray 5/10/21 with ileus noted   dressing changed daily for seroma   abd xray 5/21/21 with Decreased bowel ileus compared to prior 5/20/2021 exam noted above.   abd xray 5/22/21 with No dilated loops of bowel noted above.   cont tube feeding   CT scan of the chest 5/13/21 demonstrates diffuse bilateral infiltrates with a region of consolidation at the left lung base. Small left pneumothorax. 2 left chest tubes noted in place noted above.  CT scan of the abdomen and pelvis 5/13/21 demonstrates diffuse dilatation of small and large bowel loops most consistent with ileus noted   xray abd with  5/14/21 with Ingested contrast within nonobstructed large bowel to the level of rectum. No acute radiographic intra-abdominal findings noted   ct abd-pelv 5/27/21 with Delayed nephrographic phase enhancement of the nonobstructed bilateral kidneys, suggestive of acute renal insufficiency. No evidence of bowel obstruction. Proctitis noted above.   id f/u   patient completed course of Meropenem  leukocytosis resolved  sputum cx with Enterobacter aerogenes (Carbapenem Resistant) noted above   afebrile  Completed  meropenem, s/p 1 dose of Vancomycin   completed zosyn  lispro ss   s/p 4 units prbc for anemia  f/u h/h    transfuse prbc as needed  heme onc f/u  retic is elevated.    Haptoglobin normal  ? daily phlebotomy  no hemolysis, Fe/B12/folate adequate  hemolysis is unlikely even his baseline haptoglobin may be very elevated due to COVID  direct pamella neg noted    psych f/u/  C/w Klonopin to 1 mg q8h standing (no PRNs), with plan to further taper as tolerated  cont Seroquel to 50 mg BID standing starting tonight  Continue delirium precautions: Frequent reorientation, familiar pictures and objects at bedside, natural light in daytime,   consistent sleep/wake schedule, adequate hydration and nutrition, sensory aids (hearing aids, glasses) present if needed, minimizing noise and overstimulation,   clustering care to minimize overnight interruptions, judicious use of deliriogenic medications (anticholinergics, benzodiazepines and opioid analgesics), minimize use of restraints  phys tx eval  cont current meds   mgmt as per scu         Patient is a 55y old  Male who presents with a chief complaint of SOB (02 Jun 2021 10:38)    pt seen in icu [  ], reg med floor scu [ x  ], bed [ x ], chair at bedside [   ], awake and responsive [ x ], mildly sedated, [  ],    nad [x  ]        Allergies    No Known Allergies        Vitals    T(F): 98 (06-03-21 @ 05:56), Max: 98.8 (06-02-21 @ 15:09)  HR: 83 (06-03-21 @ 05:56) (66 - 95)  BP: 128/79 (06-03-21 @ 05:56) (128/79 - 144/85)  RR: 20 (06-03-21 @ 05:56) (20 - 23)  SpO2: 100% (06-03-21 @ 05:56) (97% - 100%)  Wt(kg): --  CAPILLARY BLOOD GLUCOSE      POCT Blood Glucose.: 89 mg/dL (03 Jun 2021 06:05)      Labs                          9.3    7.03  )-----------( 228      ( 02 Jun 2021 08:23 )             26.3       06-02    140  |  100  |  16  ----------------------------<  97  3.8   |  31  |  0.90    Ca    8.9      02 Jun 2021 08:23  Phos  2.9     06-02  Mg     1.8     06-02    TPro  6.3  /  Alb  2.8<L>  /  TBili  0.6  /  DBili  x   /  AST  20  /  ALT  33  /  AlkPhos  70  06-02            .Sputum Sputum  04-16 @ 04:42   Moderate Enterobacter aerogenes (Carbapenem Resistant)  Normal Respiratory Monserrat present  --  Enterobacter aerogenes (Carbapenem Resistant)      .Urine Clean Catch (Midstream)  03-15 @ 00:52   No growth  --  --          Radiology Results      Meds    MEDICATIONS  (STANDING):  ascorbic acid 1000 milliGRAM(s) Oral daily  BACItracin   Ointment 1 Application(s) Topical two times a day  calcium carbonate 1250 mG  + Vitamin D (OsCal 500 + D) 1 Tablet(s) Oral daily  chlorhexidine 2% Cloths 1 Application(s) Topical <User Schedule>  cholecalciferol 1000 Unit(s) Oral daily  clonazePAM  Tablet 0.5 milliGRAM(s) Oral every 12 hours  enoxaparin Injectable 40 milliGRAM(s) SubCutaneous every 24 hours  gabapentin 100 milliGRAM(s) Oral every 8 hours  labetalol 100 milliGRAM(s) Enteral Tube two times a day  methadone    Tablet 5 milliGRAM(s) Oral daily  naloxegol 12.5 milliGRAM(s) Oral daily  pantoprazole  Injectable 40 milliGRAM(s) IV Push daily  predniSONE   Tablet 15 milliGRAM(s) Oral daily  QUEtiapine 50 milliGRAM(s) Oral two times a day  senna 2 Tablet(s) Oral at bedtime  trimethoprim  40 mG/sulfamethoxazole 200 mG Suspension 160 milliGRAM(s) Oral <User Schedule>      MEDICATIONS  (PRN):  acetaminophen    Suspension .. 650 milliGRAM(s) Enteral Tube every 12 hours PRN Temp greater or equal to 38C (100.4F), Mild Pain (1 - 3)  ALBUTerol    90 MICROgram(s) HFA Inhaler 2 Puff(s) Inhalation every 6 hours PRN Shortness of Breath and/or Wheezing  artificial  tears Solution 1 Drop(s) Both EYES every 4 hours PRN Dry Eyes  bisacodyl Suppository 10 milliGRAM(s) Rectal daily PRN Constipation  ondansetron Injectable 4 milliGRAM(s) IV Push every 6 hours PRN Nausea and/or Vomiting  polyethylene glycol 3350 17 Gram(s) Oral daily PRN Constipation  sodium chloride 0.9% lock flush 10 milliLiter(s) IV Push every 1 hour PRN Pre/post blood products, medications, blood draw, and to maintain line patency      Physical Exam    Neuro :  no focal deficits  Respiratory: CTA B/L  CV: RRR, S1S2, no murmurs,   Abdominal: Soft, NT, ND +BS, gastrostomy tube inplace  Extremities: edema of extrem, + peripheral pulses      ASSESSMENT      Hypoxemia 2nd to covid pna   transaminitis  prediabetes  h/o appendectomy  cholecystectomy        PLAN    cont cont precautions  covid 5/14/21 neg noted above  d/c remdesevir given covid ab positive noted   completed dexamethasone   started pulse steroids for 3 days - 250mg solumedrol bid now tapered off 4/14/21   prednisone 20 daily d/c 6/1/21   cont on bactrim empirically, TIW   cont asa, vit c,    cont albuterol inhaler   pulm f/u  procalcitonin, D-dimer, crp, ldh, ferritin, lactate noted ,    cont tylenol prn,   cont robitussin prn   pt off precedex    pt on methadone   pain mgmt eval noted   off phenylephrine drip for hypotension  clonidine held 2nd to low bp  s/p intubation 3/29/21   s/p tracheostomy 4/21/21  O2 sat 100% (97% - 100%) mv 40%  O2 via mech vent   cont wean as tolerated   vent mgmt as per scu  thoracic surg f/u   s/p L chest tube replacement 4/18/21 for recurrence of left pneumothorax   cxr 4/20/21 with Unchanged advanced infiltrates and catheter left chest tube noted   CXR 4/11/21 with : No interval change compared to one day prior. No pneumothorax noted.   unable to flush or aspirate tube fully, noted debris in tubing which was milked out.   Once material removed from tubing able to aspirated and flush fully. Also changed dressing on pigtail which appears to be in good position.   Monitor O2 status   s/p tracheostomy 4/21/21   cxr 4/21/21 with No evidence of active chest disease. Tracheostomy tube in place otherwise no significant change noted   cxr 4/25/21 with A 40% LEFT pneumothorax. LEFT multi-sidehole pigtail catheter overlies LEFT lower hemithorax.. Bilateral multifocal and diffuse ill-defined airspace opacities..  Follow-up AP portable chest radiograph 4/25/2021 AT 8:58 AM: Residual LEFT 30% upper zone pneumothorax. Otherwise no interval change.noted    s/p 2nd chest tube 5/5/21  cxr 5/6/21 with Tracheostomy and catheter left chest tubes remain. , There are significant diffuse advanced infiltrates again noted. No pneumothorax. Above findings are similar to study earlier in the day. Present film shows a left jugular line inserted   with tip entering the superior vena cava noted   cxr 5/30/21 with Tracheostomy cannula left chest catheter reidentified in position. No change small simple left apical pneumothorax. No change bilateral airspace opacities. Trace left pleural effusion suggested noted   cxr 5/31/21 with Status post tracheostomy. Status post left chest pigtail catheter. Trace left pneumothorax is unchanged. Cardiomegaly. Moderate to severe multifocal patchy opacificationof both lungs, left greater than right, is unchanged. Nonobstructive bowel gas pattern. No dilated loops of small or large bowel noted   s/p removal of L pigtail.   CXR 6/1/21 post l pig tail removal with Left pigtail catheter seen earlier in the day has been removed. Persistent amorphous infiltrate at the site is seen. No pneumothorax noted above.  No acute thoracic surgical intervention at this time,  s/p surgical placement of gastrostomy tube 4/23/2021.   abd xray 5/10/21 with ileus noted   dressing changed daily for seroma   abd xray 5/21/21 with Decreased bowel ileus compared to prior 5/20/2021 exam noted above.   abd xray 5/22/21 with No dilated loops of bowel noted above.   cont tube feeding   CT scan of the chest 5/13/21 demonstrates diffuse bilateral infiltrates with a region of consolidation at the left lung base. Small left pneumothorax. 2 left chest tubes noted in place noted above.  CT scan of the abdomen and pelvis 5/13/21 demonstrates diffuse dilatation of small and large bowel loops most consistent with ileus noted   xray abd with  5/14/21 with Ingested contrast within nonobstructed large bowel to the level of rectum. No acute radiographic intra-abdominal findings noted   ct abd-pelv 5/27/21 with Delayed nephrographic phase enhancement of the nonobstructed bilateral kidneys, suggestive of acute renal insufficiency. No evidence of bowel obstruction. Proctitis noted above.   id f/u   patient completed course of Meropenem  leukocytosis resolved  sputum cx with Enterobacter aerogenes (Carbapenem Resistant) noted above   afebrile  Completed  meropenem, s/p 1 dose of Vancomycin   completed zosyn  lispro ss   s/p 4 units prbc for anemia  f/u h/h    transfuse prbc as needed  heme onc f/u  Haptoglobin normal  no hemolysis, Fe/B12/folate adequate  hemolysis is unlikely even his baseline haptoglobin may be very elevated due to COVID  direct pamella neg noted    psych f/u/  C/w Klonopin to 1 mg q8h standing (no PRNs), with plan to further taper as tolerated  cont Seroquel to 50 mg BID standing starting tonight  Continue delirium precautions: Frequent reorientation, familiar pictures and objects at bedside, natural light in daytime,   consistent sleep/wake schedule, adequate hydration and nutrition, sensory aids (hearing aids, glasses) present if needed, minimizing noise and overstimulation,   clustering care to minimize overnight interruptions, judicious use of deliriogenic medications (anticholinergics, benzodiazepines and opioid analgesics), minimize use of restraints  phys tx eval  cont current meds   mgmt as per scu

## 2021-06-03 NOTE — PROGRESS NOTE ADULT - PROBLEM SELECTOR PLAN 1
isolation precautions DC  Wean ventilator support as tolerated - FiO2 @ 40%.   SBT daily as tolerated  Tracheal care  Decubitus prevention  Supplemental nutrition  Monitor oxygen sat  Vit C, D and zinc supp  Montelukast 10 mgs po Qhs  Daily CXR   G-tube with feeds  Replace lytes  Pulmonary toilet  DVT and GI PPX.

## 2021-06-03 NOTE — PROGRESS NOTE ADULT - SUBJECTIVE AND OBJECTIVE BOX
Pt is awake, alert, lying in bed in NAD. Sat 100%. FiO2 @ 40%.     INTERVAL HPI/OVERNIGHT EVENTS:      VITAL SIGNS:  T(F): 98 (06-03-21 @ 05:56)  HR: 83 (06-03-21 @ 05:56)  BP: 128/79 (06-03-21 @ 05:56)  RR: 20 (06-03-21 @ 05:56)  SpO2: 100% (06-03-21 @ 05:56)  Wt(kg): --  I&O's Detail    02 Jun 2021 07:01  -  03 Jun 2021 07:00  --------------------------------------------------------  IN:  Total IN: 0 mL    OUT:    Voided (mL): 600 mL  Total OUT: 600 mL    Total NET: -600 mL        Mode: AC/ CMV (Assist Control/ Continuous Mandatory Ventilation)  RR (machine): 18  TV (machine): 450  FiO2: 40  PEEP: 5  ITime: 1  MAP: 9  PIP: 24        REVIEW OF SYSTEMS:    CONSTITUTIONAL:  No fevers, chills, sweats    HEENT:  Eyes:  No diplopia or blurred vision. ENT:  No earache, sore throat or runny nose.    CARDIOVASCULAR:  No pressure, squeezing, tightness, or heaviness about the chest; no palpitations.    RESPIRATORY:  Per HPI    GASTROINTESTINAL:  No abdominal pain, nausea, vomiting or diarrhea.    GENITOURINARY:  No dysuria, frequency or urgency.    NEUROLOGIC:  No paresthesias, fasciculations, seizures or weakness.    PSYCHIATRIC:  No disorder of thought or mood.      PHYSICAL EXAM:    Constitutional: Well developed and nourished  Eyes:Perrla  ENMT: normal  Neck:supple  Respiratory: good air entry; trach.   Cardiovascular: S1 S2 regular  Gastrointestinal: Soft, Non tender, PEG   Extremities: No edema  Vascular:normal  Neurological:Awake, alert,Ox3  Musculoskeletal: weak. bed.       MEDICATIONS  (STANDING):  ascorbic acid 1000 milliGRAM(s) Oral daily  BACItracin   Ointment 1 Application(s) Topical two times a day  calcium carbonate 1250 mG  + Vitamin D (OsCal 500 + D) 1 Tablet(s) Oral daily  chlorhexidine 2% Cloths 1 Application(s) Topical <User Schedule>  cholecalciferol 1000 Unit(s) Oral daily  clonazePAM  Tablet 0.5 milliGRAM(s) Oral every 12 hours  enoxaparin Injectable 40 milliGRAM(s) SubCutaneous every 24 hours  gabapentin 100 milliGRAM(s) Oral every 8 hours  labetalol 100 milliGRAM(s) Enteral Tube two times a day  methadone    Tablet 5 milliGRAM(s) Oral daily  naloxegol 12.5 milliGRAM(s) Oral daily  pantoprazole  Injectable 40 milliGRAM(s) IV Push daily  predniSONE   Tablet 15 milliGRAM(s) Oral daily  QUEtiapine 50 milliGRAM(s) Oral two times a day  senna 2 Tablet(s) Oral at bedtime  trimethoprim  40 mG/sulfamethoxazole 200 mG Suspension 160 milliGRAM(s) Oral <User Schedule>    MEDICATIONS  (PRN):  acetaminophen    Suspension .. 650 milliGRAM(s) Enteral Tube every 12 hours PRN Temp greater or equal to 38C (100.4F), Mild Pain (1 - 3)  ALBUTerol    90 MICROgram(s) HFA Inhaler 2 Puff(s) Inhalation every 6 hours PRN Shortness of Breath and/or Wheezing  artificial  tears Solution 1 Drop(s) Both EYES every 4 hours PRN Dry Eyes  bisacodyl Suppository 10 milliGRAM(s) Rectal daily PRN Constipation  ondansetron Injectable 4 milliGRAM(s) IV Push every 6 hours PRN Nausea and/or Vomiting  polyethylene glycol 3350 17 Gram(s) Oral daily PRN Constipation  sodium chloride 0.9% lock flush 10 milliLiter(s) IV Push every 1 hour PRN Pre/post blood products, medications, blood draw, and to maintain line patency      Allergies    No Known Allergies    Intolerances        LABS:                        9.3    7.03  )-----------( 228      ( 02 Jun 2021 08:23 )             26.3     06-02    140  |  100  |  16  ----------------------------<  97  3.8   |  31  |  0.90    Ca    8.9      02 Jun 2021 08:23  Phos  2.9     06-02  Mg     1.8     06-02    TPro  6.3  /  Alb  2.8<L>  /  TBili  0.6  /  DBili  x   /  AST  20  /  ALT  33  /  AlkPhos  70  06-02    CAPILLARY BLOOD GLUCOSE    POCT Blood Glucose.: 89 mg/dL (03 Jun 2021 06:05)  POCT Blood Glucose.: 110 mg/dL (03 Jun 2021 00:35)  POCT Blood Glucose.: 102 mg/dL (02 Jun 2021 16:58)  POCT Blood Glucose.: 131 mg/dL (02 Jun 2021 12:03)        RADIOLOGY & ADDITIONAL TESTS:    CXR:    Ct scan chest:    ekg;    echo:

## 2021-06-03 NOTE — CHART NOTE - NSCHARTNOTEFT_GEN_A_CORE
- called brother Alec . updated regarding pt's respiratory status, condition, and plan of care, Brother appreciative of update.

## 2021-06-03 NOTE — PROGRESS NOTE ADULT - PROBLEM SELECTOR PLAN 3
Improved.   Abd xray results as above  Continue PEG tube feedings  Continue with bowel regimen  Continue Naloxegol  Continue with Zofran prn  Serial abdominal exam  Monitor PEG residual

## 2021-06-03 NOTE — PROGRESS NOTE ADULT - PROBLEM SELECTOR PLAN 6
Delirium due to COVID  infection, PNA,  metabolic derangement, benzo withdrawal, prolonged hospital stay  Improving  Continue Klonopin 0.5mg  ---->decreased to q12h.   Continue Seroquel 50mg BID,   Methadone  Supportive measures  Psych following

## 2021-06-03 NOTE — PROGRESS NOTE ADULT - ASSESSMENT
55M, no PMH, PSH appy and moody 1990s presented 3/14 with x9 days worsening cough, subjective fevers, and SOB, with x2-3 days dysuria and central, non-radiating, constant CP, admitted to [facility] for management of COVID PNA. Given discomfort of breathing, placed on 4L NC, with improvement. Labs notable for lymphopenia and neutrophilia despite WBC wnl, d-dimer 423, AST//114, lactate 3.3, and . Trop negative x1. +COVID. CXR notable for b/l infiltrates, L>R. Given x1 dose Tylenol and 1L IVF bolus in ED. Initially started on Remdesivir and Dexamethasone, Remdesivir was later discontinued secondary to positive antibodies and elevated LFT's. Not a  candidate for Tociluzimab due to elevated LFT. Patient continued to have respiratory distress requiring 15 L NRB support.    3/19/2021- Patient got transferred to ICU  due to increased work of breathing despite being on 15 L NRB . Patient was on HFNC to keep saturation >90%. Chest X ray showed Grossly stable mild left apical pneumothorax. Stable small pneumomediastinum. Soft tissue emphysema at the neck bases again noted. Stable patchy bilateral pulmonary infiltrates. Thoracic surgery consulted, no intervention at this time.  3/22/2021 - Chest X ray revealed trace ptx right and mild left pneumothorax  3/24/2021 - Overnight pt saturation remains in early 90s and late 80s . Pt remains on HFNC. CXR stable  3/25/2021- Chest X ray showed Overlying chin obscures lung apices. Questionable tiny left apical pneumothorax. Trial of semi- pulse steroid .. solumedrol 250 bid x 3 days   3/28- Started on solumedrol 40 mg bid  3/29/2021 - At 5.30 am, pt was in severe respiratory distress with tachypneic in 40s, tachycardic in 140s, saturating at high 70s, so decision was made to sedate and intubate the pt. Anesthesiologist intubated the patient. A central line, NGT  was placed  4/1/2021- Palliative care consulted. patient's brother/Idalgo ([phone]) has agreed to act as surrogate. Prednisone tapered to  once daily   4/6/2021- Left TLC placed, Hypertensive - s/p Lopressor 5 mg X 1  4/8/2021- Lasix daily and HCTz 50 mg added to aim for negative fluid balance, Left Chest tube placed, added Albumin (4/8-4/10)  4/11/2021-patient was having fever 100.8 rectal, s/p Tylenol , s/p 1 dose of vancomycin, on zosyn - New infiltrate on CXR ,likely developing VAP  4/13/2021- Continue to spike fevers, Zosyn switched to Meropenem. ID- Dr Anand following, new TLC placed  4/15/2021- Chest tuber discontinued   4/18/2021- BP running on lower side, Started on phenylephrine, Ibuprofen D/Connor , FiO2 increased to 70%Patient had large pneumothorax note don Left, thoracic surgery was consulted ,  left chest tube placed.  4/20/2021- Patient received 1 dose of Epifanio for vent asynchrony, A line placed, vent settings changed fro mPC to CVVC     4/21/2021- Trach placed  4/23/2021- PEG tube placed  4/25/2021 - cxr 4/25/21 with A 40% LEFT pneumothorax. LEFT multi-sidehole pigtail catheter overlies LEFT lower hemithorax.. Bilateral multifocal and diffuse ill-defined airspace opacities..  Follow-up AP portable chest radiograph 4/25/2021 AT 8:58 AM: Residual LEFT 30% upper zone pneumothorax.  4/26/2021- Patient noted to have hemoglobin drop to 6.8 from 7.5, will transfuse one unit of PRBC and monitor CBC.   4/27/2021- Patient's hemoglobin dropped again to 7, will give two unit of PRBC  4/28/2021- On 4/28 Hb 6.8, s/p 1 unit of PRBC hb 7.5 now. 4 PRBC total  CTH and CT abdomen w/o contrast no evidence of bleed  No active signs of bleeding (No hematemesis, melena or hematuria)  F/u hemolysis w/u- negative so far, elevated reticulocytes . Dr Andrade consulted  5/2/2021-Patients Hb remained stable, was tachypneic and breathing over the vent so 1 mg push of Dilaudid X 2 was given. V: 20/400/60%/3+ on precedex drip at 1.5.   5/3/2021- patient was found to have large gastric bubble, G tube was connected to wall suction, repeat CXR showed improvement.  5/5/2021 - Hemoglobin dropped from 8.8 to 7.4.Will add bactrim empirically as patient is on chronic prednisone for organizing pneumonia. Tube feeds were held because of diarrhea  5/6/2021- CT chest was done, which showed mild PTX. Left 3rd intercostal space Chest tube was placed. Patient was hypotensive overnight. Cristiano temperature. Was septic, will send cultures if spikes temperature again. Patient was retaining urine, andrea was placed overnight. Will start on albumin and give one dose of lasix as patient was hypotensive and edematous in upper extremities, also had low albumin. Patient was getting agitated, will give ketamine IV push and monitor if it helps.  5/10/2021- Pt tachycardic overnight and hypertensive to 228/116 w/p 1x lopressor. Stopped levophed. Pt became hypotensive. Then started phenylephrine. BP now stable. Will d/c methadone and seroquel and continue on precedex for sedation. CXR showing apical pneumothorax   5/13/2021- Pt tachycardic to 160-170 yesterday afternoon and night. Pupils dilated. Did not respond to increased doses of opiates and benzos. Developed fever to 102.3 overnight with episode of vomiting. UA was positive. started empirically on vanc/zosyn - will d/c for now as no clear source of infection. AM abdominal xray w/ likely ileus. Will restart Free water. Hold Lasix. CT abdomen done showing ileus and possible fecal matter in rectum. Fecal disimpaction performed, small ball of stool retrieved with liquid stool excreted subsequently. Spoke to Surgery RUBI Wood. Tube feeds stopped and PEG tube placed to suction with no output.. Also will restart Precedex as likely in withdrawal and clonid  5/18/2021- Patient was tachy and agitated. Later he had hypotension and bradycardia. He was given 1L bolus and was restarted on pheny after which he improved  5/25- Behavioral health consulted due to difficultyn weaning off sedation . Recommended C/w Klonopin to 1 mg q8h standing (no PRNs), with plan to further taper as tolerated. C/w Seroquel 50 mg BID standing  5/26/2021- BP was elevated, was started on labetalol. d/c andrea.  CT placed on water seal, c/w water seal  5/27/2021- Vomiting X2, held feeding, CT Abdomen/Pelvis ordered  5/28/2021- Placed on trach collar today  5/30/2021- Patient was on trach collar throughout the day yesterday and placed back on vent overnight. No other acute events overnight.    5/31/2021- Decreased Methadone to 5 mg daily and Klonopin to 0.5 mg every 8 hours. Plan was made to transfer patient to SCU  6/1 Abd discomfort. AXR results as above, no bowel obstruction. Resume PEG feedings.   6/1 TC trial. Pt tolerated only 10 minutes, RR ~30's and anxious.  Will attempt SBT with PS.

## 2021-06-03 NOTE — PROGRESS NOTE ADULT - PROBLEM SELECTOR PLAN 10
S/p trach /PEG  SBT with PS during the day, Full  mechanical ventilatory at night.  Will require vent facility  Daily PT   SW following

## 2021-06-03 NOTE — PROGRESS NOTE ADULT - PROBLEM SELECTOR PLAN 8
PEG feedings , rate increased to 30ml/hr for goal of 50ml/hr   Continue supplements   Nutrition consult appreciated. PEG feedings , rate increased to 30ml/hr for goal of 50ml/hr.  Continue supplements   Nutrition consult appreciated.

## 2021-06-03 NOTE — PROGRESS NOTE ADULT - PROBLEM SELECTOR PLAN 2
Resolved.   S/p Left CT pigtail removed 6/1  [facility] Thoracic Surgery Resolved.   S/p Left CT pigtail removed 6/1  Appreciate Thoracic Surgery  Repeat CXR in am.

## 2021-06-03 NOTE — PROGRESS NOTE ADULT - ATTENDING COMMENTS
Problem/Plan - 1:  ·  Problem: Acute hypoxemic respiratory failure due to COVID-19.  Plan: S/p Dexamethasone , No Remdesivir  due to positive antibodies   Covid19 PCR negative now spo far, off isolation   CXR results as above  Continue Bactrim for empiric coverage   Continue with prednisone taper  Continue inhalers  Continue ventilatory support.   Will continue SBT with PS during the day as tolerated. Only tolerated 10 minutes. After 10 minutes became tachypneic.  Full vent support overnight  Monitor oxygenation saturation.

## 2021-06-03 NOTE — PROGRESS NOTE ADULT - PROBLEM SELECTOR PLAN 4
S/p 4 PRBC total    Hg stable > 8  CTH and CT abdomen w/o contrast no evidence of bleed    No active signs of bleeding (No hematemesis, melena or hematuria)  Hemolysis w/u- negative  Iron studies show ACD  Monitor CBC .   Transfuse Hgb <8  Dr Andrade on board

## 2021-06-03 NOTE — PROGRESS NOTE ADULT - SUBJECTIVE AND OBJECTIVE BOX
ARANZA CHAMBERS    SCU progress note    INTERVAL HPI/OVERNIGHT EVENTS: no new complaints, remains on FIO2 of 40%, saturating well.     DNR FULL CODE    Covid - 19 PCR: negative since 6/1    The 4Ms  What Matters Most: see GOC  Age appropriate Medications/Screen for High Risk Medication: Yes  Mentation: see CAM below  Mobility: defer to physical exam    The Confusion Assessment Method (CAM) Diagnostic Algorithm     1: Acute Onset or Fluctuating Course  - Is there evidence of an acute change in mental status from the patient’s baseline? Did the (abnormal) behavior  fluctuate during the day, that is, tend to come and go, or increase and decrease in severity?  [ ] YES [ x] NO     2: Inattention  - Did the patient have difficulty focusing attention, being easily distractible, or having difficulty keeping track of what was being said?  [ ] YES [ x] NO     3: Disorganized thinking  -Was the patient’s thinking disorganized or incoherent, such as rambling or irrelevant conversation, unclear or illogical flow of ideas, or unpredictable switching from subject to subject?  [ ] YES [x ] NO    4: Altered Level of consciousness?  [ ] YES [x ] NO    The diagnosis of delirium by CAM requires the presence of features 1 and 2 and either 3 or 4.    PRESSORS: [ ] YES [x ] NO  trimethoprim  40 mG/sulfamethoxazole 200 mG Suspension 160 milliGRAM(s) Oral <User Schedule>    Cardiovascular:  Heart Failure  Acute   Acute on Chronic  Chronic       labetalol 100 milliGRAM(s) Enteral Tube two times a day    Pulmonary:  ALBUTerol    90 MICROgram(s) HFA Inhaler 2 Puff(s) Inhalation every 6 hours PRN    Hematalogic:  enoxaparin Injectable 40 milliGRAM(s) SubCutaneous every 24 hours    Other:  acetaminophen    Suspension .. 650 milliGRAM(s) Enteral Tube every 12 hours PRN  artificial  tears Solution 1 Drop(s) Both EYES every 4 hours PRN  ascorbic acid 1000 milliGRAM(s) Oral daily  BACItracin   Ointment 1 Application(s) Topical two times a day  bisacodyl Suppository 10 milliGRAM(s) Rectal daily PRN  calcium carbonate 1250 mG  + Vitamin D (OsCal 500 + D) 1 Tablet(s) Oral daily  chlorhexidine 2% Cloths 1 Application(s) Topical <User Schedule>  cholecalciferol 1000 Unit(s) Oral daily  clonazePAM  Tablet 0.5 milliGRAM(s) Oral every 12 hours  gabapentin 100 milliGRAM(s) Oral every 8 hours  methadone    Tablet 5 milliGRAM(s) Oral daily  naloxegol 12.5 milliGRAM(s) Oral daily  ondansetron Injectable 4 milliGRAM(s) IV Push every 6 hours PRN  pantoprazole  Injectable 40 milliGRAM(s) IV Push daily  polyethylene glycol 3350 17 Gram(s) Oral daily PRN  predniSONE   Tablet 15 milliGRAM(s) Oral daily  QUEtiapine 50 milliGRAM(s) Oral two times a day  senna 2 Tablet(s) Oral at bedtime  sodium chloride 0.9% lock flush 10 milliLiter(s) IV Push every 1 hour PRN    acetaminophen    Suspension .. 650 milliGRAM(s) Enteral Tube every 12 hours PRN  ALBUTerol    90 MICROgram(s) HFA Inhaler 2 Puff(s) Inhalation every 6 hours PRN  artificial  tears Solution 1 Drop(s) Both EYES every 4 hours PRN  ascorbic acid 1000 milliGRAM(s) Oral daily  BACItracin   Ointment 1 Application(s) Topical two times a day  bisacodyl Suppository 10 milliGRAM(s) Rectal daily PRN  calcium carbonate 1250 mG  + Vitamin D (OsCal 500 + D) 1 Tablet(s) Oral daily  chlorhexidine 2% Cloths 1 Application(s) Topical <User Schedule>  cholecalciferol 1000 Unit(s) Oral daily  clonazePAM  Tablet 0.5 milliGRAM(s) Oral every 12 hours  enoxaparin Injectable 40 milliGRAM(s) SubCutaneous every 24 hours  gabapentin 100 milliGRAM(s) Oral every 8 hours  labetalol 100 milliGRAM(s) Enteral Tube two times a day  methadone    Tablet 5 milliGRAM(s) Oral daily  naloxegol 12.5 milliGRAM(s) Oral daily  ondansetron Injectable 4 milliGRAM(s) IV Push every 6 hours PRN  pantoprazole  Injectable 40 milliGRAM(s) IV Push daily  polyethylene glycol 3350 17 Gram(s) Oral daily PRN  predniSONE   Tablet 15 milliGRAM(s) Oral daily  QUEtiapine 50 milliGRAM(s) Oral two times a day  senna 2 Tablet(s) Oral at bedtime  sodium chloride 0.9% lock flush 10 milliLiter(s) IV Push every 1 hour PRN  trimethoprim  40 mG/sulfamethoxazole 200 mG Suspension 160 milliGRAM(s) Oral <User Schedule>    Drug Dosing Weight  Height (cm): 167.6 (23 Apr 2021 23:15)  Weight (kg): 83.9 (23 Apr 2021 23:15)  BMI (kg/m2): 29.9 (23 Apr 2021 23:15)  BSA (m2): 1.93 (23 Apr 2021 23:15)    CENTRAL LINE: [ ] YES [x ] NO  LOCATION:   DATE INSERTED:   REMOVE: [ ] YES [ ] NO  EXPLAIN:    RIVERA: [ ] YES [ x] NO    DATE INSERTED:  REMOVE:  [ ] YES [ ] NO  EXPLAIN:    PAST MEDICAL & SURGICAL HISTORY:  No pertinent past medical history    S/P moody sherman    S/P appendectomy  1990s 06-02 @ 07:01  -  06-03 @ 07:00  --------------------------------------------------------  IN: 0 mL / OUT: 600 mL / NET: -600 mL    Mode: AC/ CMV (Assist Control/ Continuous Mandatory Ventilation)  RR (machine): 18  TV (machine): 450  FiO2: 40  PEEP: 5  ITime: 1  MAP: 9  PIP: 24    PHYSICAL EXAM:    GENERAL: NAD,  HEAD:  Atraumatic, Normocephalic  EYES: EOMI, PERRLA, conjunctiva and sclera clear  ENMT: Trach site attached to vent, dressing CDI  NECK: Supple, No JVD, Normal thyroid  NERVOUS SYSTEM:  Alert & Oriented X3, Good concentration; Motor Strength 5/5 B/L upper and lower extremities; DTRs 2+ intact and symmetric  CHEST/LUNG: Clear to percussion bilaterally; No rales, rhonchi, wheezing, or rubs  HEART: Regular rate and rhythm; No murmurs, rubs, or gallops  ABDOMEN: Soft, Nontender, Nondistended; Bowel sounds present, pegtube with dressing CDI  EXTREMITIES:  2+ Peripheral Pulses, No clubbing, cyanosis, or edema  LYMPH: No lymphadenopathy noted  SKIN: sacral ulcer,    LABS:  CBC Full  -  ( 02 Jun 2021 08:23 )  WBC Count : 7.03 K/uL  RBC Count : 2.53 M/uL  Hemoglobin : 9.3 g/dL  Hematocrit : 26.3 %  Platelet Count - Automated : 228 K/uL  Mean Cell Volume : 104.0 fl  Mean Cell Hemoglobin : 36.8 pg  Mean Cell Hemoglobin Concentration : 35.4 gm/dL  Auto Neutrophil # : x  Auto Lymphocyte # : x  Auto Monocyte # : x  Auto Eosinophil # : x  Auto Basophil # : x  Auto Neutrophil % : x  Auto Lymphocyte % : x  Auto Monocyte % : x  Auto Eosinophil % : x  Auto Basophil % : x    06-02    140  |  100  |  16  ----------------------------<  97  3.8   |  31  |  0.90    Ca    8.9      02 Jun 2021 08:23  Phos  2.9     06-02  Mg     1.8     06-02    TPro  6.3  /  Alb  2.8<L>  /  TBili  0.6  /  DBili  x   /  AST  20  /  ALT  33  /  AlkPhos  70  06-02    [x ]  DVT Prophylaxis  [ x ]  Nutrition, Brand, Rate: Vital 1.2 Leonardo at 30ml/hr         Goal Rate 50ml/hr        Abnormal Nutritional Status -  Malnutrition   Cachexia      Morbid Obesity BMI >/=40    RADIOLOGY & ADDITIONAL STUDIES:    < from: Xray Chest 1 View- PORTABLE-Urgent (Xray Chest 1 View- PORTABLE-Urgent .) (06.01.21 @ 14:29) >  EXAM:  XR CHEST PORTABLE URGENT 1V                            PROCEDURE DATE:  06/01/2021          INTERPRETATION:  AP erect chest on June 1, 2021 at 2:34 PM. Patient had removal of pigtail left chest tube.    Heart magnified by technique. Tracheostomy remains.    Left pigtail catheter seen earlier in the day has been removed.    Persistent amorphous infiltrate at the site is seen. No pneumothorax.    IMPRESSION: Uneventful removal of left chest tube.            CARMEN FARRELL M.D., ATTENDING RADIOLOGIST  This document has been electronically signed. Jun 1 2021  4:15PM    < end of copied text >  < from: Xray Abdomen 1 View PORTABLE -Routine (Xray Abdomen 1 View PORTABLE -Routine in AM.) (05.31.21 @ 08:49) >  EXAM:  XR ABDOMEN PORTABLE ROUTINE 1V                          EXAM:  XR CHEST PORTABLE ROUTINE 1V                            PROCEDURE DATE:  05/31/2021          INTERPRETATION:  Clinical Information: Pneumothorax. Constipation.    Technique: AP chest image. AP abdomen image.    Comparison: 05/30/2021    Findings/  Impression: Status post tracheostomy. Status post left chest pigtail catheter. Trace left pneumothorax is unchanged. Cardiomegaly. Moderate to severe multifocal patchy opacificationof both lungs, left greater than right, is unchanged.    Nonobstructive bowel gas pattern. No dilated loops of small or large bowel.      HOWIE HILL MD; Attending Interventional Radiologist  This document has been electronically signed.May 31 2021 10:13AM    < end of copied text >      Goals of Care Discussion with Family/Proxy/Other   - see note from/family meeting set up for, see note from 4/14

## 2021-06-04 DIAGNOSIS — U07.1 COVID-19: ICD-10-CM

## 2021-06-04 DIAGNOSIS — Z71.89 OTHER SPECIFIED COUNSELING: ICD-10-CM

## 2021-06-04 LAB
ALBUMIN SERPL ELPH-MCNC: 3.1 G/DL — LOW (ref 3.5–5)
ALP SERPL-CCNC: 88 U/L — SIGNIFICANT CHANGE UP (ref 40–120)
ALT FLD-CCNC: 41 U/L DA — SIGNIFICANT CHANGE UP (ref 10–60)
ANION GAP SERPL CALC-SCNC: 5 MMOL/L — SIGNIFICANT CHANGE UP (ref 5–17)
AST SERPL-CCNC: 25 U/L — SIGNIFICANT CHANGE UP (ref 10–40)
BILIRUB SERPL-MCNC: 0.5 MG/DL — SIGNIFICANT CHANGE UP (ref 0.2–1.2)
BUN SERPL-MCNC: 19 MG/DL — HIGH (ref 7–18)
CALCIUM SERPL-MCNC: 8.9 MG/DL — SIGNIFICANT CHANGE UP (ref 8.4–10.5)
CHLORIDE SERPL-SCNC: 97 MMOL/L — SIGNIFICANT CHANGE UP (ref 96–108)
CO2 SERPL-SCNC: 34 MMOL/L — HIGH (ref 22–31)
CREAT SERPL-MCNC: 0.8 MG/DL — SIGNIFICANT CHANGE UP (ref 0.5–1.3)
GLUCOSE BLDC GLUCOMTR-MCNC: 121 MG/DL — HIGH (ref 70–99)
GLUCOSE BLDC GLUCOMTR-MCNC: 127 MG/DL — HIGH (ref 70–99)
GLUCOSE BLDC GLUCOMTR-MCNC: 154 MG/DL — HIGH (ref 70–99)
GLUCOSE SERPL-MCNC: 105 MG/DL — HIGH (ref 70–99)
HCT VFR BLD CALC: 27.9 % — LOW (ref 39–50)
HGB BLD-MCNC: 9.9 G/DL — LOW (ref 13–17)
MAGNESIUM SERPL-MCNC: 2 MG/DL — SIGNIFICANT CHANGE UP (ref 1.6–2.6)
MCHC RBC-ENTMCNC: 35.5 GM/DL — SIGNIFICANT CHANGE UP (ref 32–36)
MCHC RBC-ENTMCNC: 36 PG — HIGH (ref 27–34)
MCV RBC AUTO: 101.5 FL — HIGH (ref 80–100)
NRBC # BLD: 0 /100 WBCS — SIGNIFICANT CHANGE UP (ref 0–0)
PHOSPHATE SERPL-MCNC: 2.6 MG/DL — SIGNIFICANT CHANGE UP (ref 2.5–4.5)
PLATELET # BLD AUTO: 228 K/UL — SIGNIFICANT CHANGE UP (ref 150–400)
POTASSIUM SERPL-MCNC: 3.2 MMOL/L — LOW (ref 3.5–5.3)
POTASSIUM SERPL-SCNC: 3.2 MMOL/L — LOW (ref 3.5–5.3)
PROT SERPL-MCNC: 6.5 G/DL — SIGNIFICANT CHANGE UP (ref 6–8.3)
RBC # BLD: 2.75 M/UL — LOW (ref 4.2–5.8)
RBC # FLD: 16.1 % — HIGH (ref 10.3–14.5)
SODIUM SERPL-SCNC: 136 MMOL/L — SIGNIFICANT CHANGE UP (ref 135–145)
WBC # BLD: 7.43 K/UL — SIGNIFICANT CHANGE UP (ref 3.8–10.5)
WBC # FLD AUTO: 7.43 K/UL — SIGNIFICANT CHANGE UP (ref 3.8–10.5)

## 2021-06-04 PROCEDURE — 71045 X-RAY EXAM CHEST 1 VIEW: CPT | Mod: 26

## 2021-06-04 RX ORDER — POTASSIUM CHLORIDE 20 MEQ
40 PACKET (EA) ORAL ONCE
Refills: 0 | Status: COMPLETED | OUTPATIENT
Start: 2021-06-04 | End: 2021-06-04

## 2021-06-04 RX ADMIN — QUETIAPINE FUMARATE 50 MILLIGRAM(S): 200 TABLET, FILM COATED ORAL at 11:59

## 2021-06-04 RX ADMIN — SENNA PLUS 2 TABLET(S): 8.6 TABLET ORAL at 22:03

## 2021-06-04 RX ADMIN — Medication 0.5 MILLIGRAM(S): at 05:47

## 2021-06-04 RX ADMIN — Medication 100 MILLIGRAM(S): at 05:47

## 2021-06-04 RX ADMIN — GABAPENTIN 100 MILLIGRAM(S): 400 CAPSULE ORAL at 05:47

## 2021-06-04 RX ADMIN — Medication 1 APPLICATION(S): at 18:22

## 2021-06-04 RX ADMIN — Medication 1000 UNIT(S): at 12:01

## 2021-06-04 RX ADMIN — Medication 1 TABLET(S): at 12:01

## 2021-06-04 RX ADMIN — GABAPENTIN 100 MILLIGRAM(S): 400 CAPSULE ORAL at 22:02

## 2021-06-04 RX ADMIN — Medication 1 APPLICATION(S): at 05:48

## 2021-06-04 RX ADMIN — Medication 0.5 MILLIGRAM(S): at 18:22

## 2021-06-04 RX ADMIN — Medication 160 MILLIGRAM(S): at 05:48

## 2021-06-04 RX ADMIN — ENOXAPARIN SODIUM 40 MILLIGRAM(S): 100 INJECTION SUBCUTANEOUS at 12:01

## 2021-06-04 RX ADMIN — Medication 40 MILLIEQUIVALENT(S): at 13:25

## 2021-06-04 RX ADMIN — CHLORHEXIDINE GLUCONATE 1 APPLICATION(S): 213 SOLUTION TOPICAL at 05:46

## 2021-06-04 RX ADMIN — METHADONE HYDROCHLORIDE 5 MILLIGRAM(S): 40 TABLET ORAL at 13:24

## 2021-06-04 RX ADMIN — NALOXEGOL OXALATE 12.5 MILLIGRAM(S): 12.5 TABLET, FILM COATED ORAL at 11:59

## 2021-06-04 RX ADMIN — QUETIAPINE FUMARATE 50 MILLIGRAM(S): 200 TABLET, FILM COATED ORAL at 22:03

## 2021-06-04 RX ADMIN — Medication 1000 MILLIGRAM(S): at 11:59

## 2021-06-04 RX ADMIN — Medication 15 MILLIGRAM(S): at 05:47

## 2021-06-04 RX ADMIN — Medication 100 MILLIGRAM(S): at 18:22

## 2021-06-04 RX ADMIN — PANTOPRAZOLE SODIUM 40 MILLIGRAM(S): 20 TABLET, DELAYED RELEASE ORAL at 12:00

## 2021-06-04 RX ADMIN — GABAPENTIN 100 MILLIGRAM(S): 400 CAPSULE ORAL at 13:24

## 2021-06-04 NOTE — PROGRESS NOTE ADULT - SUBJECTIVE AND OBJECTIVE BOX
ARANZA CHAMBERS    SCU progress note    INTERVAL HPI/OVERNIGHT EVENTS: ***No overnight events    DNR [ ]   DNI  [  ]   FULL CODE    Covid - 19 PCR: Negative 6/1    The 4Ms    What Matters Most: see GOC  Age appropriate Medications/Screen for High Risk Medication: Yes  Mentation: see CAM below  Mobility: defer to physical exam    The Confusion Assessment Method (CAM) Diagnostic Algorithm     1: Acute Onset or Fluctuating Course  - Is there evidence of an acute change in mental status from the patient’s baseline? Did the (abnormal) behavior  fluctuate during the day, that is, tend to come and go, or increase and decrease in severity?  [ ] YES [x ] NO     2: Inattention  - Did the patient have difficulty focusing attention, being easily distractible, or having difficulty keeping track of what was being said?  [ ] YES [x ] NO     3: Disorganized thinking  -Was the patient’s thinking disorganized or incoherent, such as rambling or irrelevant conversation, unclear or illogical flow of ideas, or unpredictable switching from subject to subject?  [ ] YES [ ] NO  unable to access    4: Altered Level of consciousness?  [ ] YES [x ] NO    The diagnosis of delirium by CAM requires the presence of features 1 and 2 and either 3 or 4.    PRESSORS: [ ] YES [x ] NO  trimethoprim  40 mG/sulfamethoxazole 200 mG Suspension 160 milliGRAM(s) Oral <User Schedule>    Cardiovascular:  Heart Failure  Acute   Acute on Chronic  Chronic       labetalol 100 milliGRAM(s) Enteral Tube two times a day    Pulmonary:  ALBUTerol    90 MICROgram(s) HFA Inhaler 2 Puff(s) Inhalation every 6 hours PRN    Hematalogic:  enoxaparin Injectable 40 milliGRAM(s) SubCutaneous every 24 hours    Other:  acetaminophen    Suspension .. 650 milliGRAM(s) Enteral Tube every 12 hours PRN  artificial  tears Solution 1 Drop(s) Both EYES every 4 hours PRN  ascorbic acid 1000 milliGRAM(s) Oral daily  BACItracin   Ointment 1 Application(s) Topical two times a day  bisacodyl Suppository 10 milliGRAM(s) Rectal daily PRN  calcium carbonate 1250 mG  + Vitamin D (OsCal 500 + D) 1 Tablet(s) Oral daily  chlorhexidine 2% Cloths 1 Application(s) Topical <User Schedule>  cholecalciferol 1000 Unit(s) Oral daily  clonazePAM  Tablet 0.5 milliGRAM(s) Oral every 12 hours  gabapentin 100 milliGRAM(s) Oral every 8 hours  methadone    Tablet 5 milliGRAM(s) Oral daily  naloxegol 12.5 milliGRAM(s) Oral daily  ondansetron Injectable 4 milliGRAM(s) IV Push every 6 hours PRN  pantoprazole  Injectable 40 milliGRAM(s) IV Push daily  polyethylene glycol 3350 17 Gram(s) Oral daily PRN  potassium chloride   Powder 40 milliEquivalent(s) Oral once  predniSONE   Tablet 15 milliGRAM(s) Oral daily  QUEtiapine 50 milliGRAM(s) Oral two times a day  senna 2 Tablet(s) Oral at bedtime  sodium chloride 0.9% lock flush 10 milliLiter(s) IV Push every 1 hour PRN    acetaminophen    Suspension .. 650 milliGRAM(s) Enteral Tube every 12 hours PRN  ALBUTerol    90 MICROgram(s) HFA Inhaler 2 Puff(s) Inhalation every 6 hours PRN  artificial  tears Solution 1 Drop(s) Both EYES every 4 hours PRN  ascorbic acid 1000 milliGRAM(s) Oral daily  BACItracin   Ointment 1 Application(s) Topical two times a day  bisacodyl Suppository 10 milliGRAM(s) Rectal daily PRN  calcium carbonate 1250 mG  + Vitamin D (OsCal 500 + D) 1 Tablet(s) Oral daily  chlorhexidine 2% Cloths 1 Application(s) Topical <User Schedule>  cholecalciferol 1000 Unit(s) Oral daily  clonazePAM  Tablet 0.5 milliGRAM(s) Oral every 12 hours  enoxaparin Injectable 40 milliGRAM(s) SubCutaneous every 24 hours  gabapentin 100 milliGRAM(s) Oral every 8 hours  labetalol 100 milliGRAM(s) Enteral Tube two times a day  methadone    Tablet 5 milliGRAM(s) Oral daily  naloxegol 12.5 milliGRAM(s) Oral daily  ondansetron Injectable 4 milliGRAM(s) IV Push every 6 hours PRN  pantoprazole  Injectable 40 milliGRAM(s) IV Push daily  polyethylene glycol 3350 17 Gram(s) Oral daily PRN  potassium chloride   Powder 40 milliEquivalent(s) Oral once  predniSONE   Tablet 15 milliGRAM(s) Oral daily  QUEtiapine 50 milliGRAM(s) Oral two times a day  senna 2 Tablet(s) Oral at bedtime  sodium chloride 0.9% lock flush 10 milliLiter(s) IV Push every 1 hour PRN  trimethoprim  40 mG/sulfamethoxazole 200 mG Suspension 160 milliGRAM(s) Oral <User Schedule>    Drug Dosing Weight  Height (cm): 167.6 (23 Apr 2021 23:15)  Weight (kg): 83.9 (23 Apr 2021 23:15)  BMI (kg/m2): 29.9 (23 Apr 2021 23:15)  BSA (m2): 1.93 (23 Apr 2021 23:15)    CENTRAL LINE: [ ] YES [x ] NO  LOCATION:   DATE INSERTED:  REMOVE: [ ] YES [ ] NO  EXPLAIN:    RIVERA: [ ] YES [x ] NO    DATE INSERTED:  REMOVE:  [ ] YES [ ] NO  EXPLAIN:    PAST MEDICAL & SURGICAL HISTORY:  No pertinent past medical history    S/P moody sherman    S/P appendectomy  1990s 06-03 @ 07:01  -  06-04 @ 07:00  --------------------------------------------------------  IN: 0 mL / OUT: 600 mL / NET: -600 mL        Mode: AC/ CMV (Assist Control/ Continuous Mandatory Ventilation)  RR (machine): 18  TV (machine): 450  FiO2: 40  PEEP: 5  ITime: 1  MAP: 9  PIP: 21      PHYSICAL EXAM:    GENERAL: NAD, well-groomed, well-developed  HEAD:  Atraumatic, Normocephalic  EYES: EOMI, PERRLA, conjunctiva and sclera clear  ENMT: No tonsillar erythema, exudates  NECK: Supple, No JVD. Tracheostomy intact  NERVOUS SYSTEM:  Alert & Oriented X3, Follows simple commands, moving all extremities.  CHEST/LUNG: diminished breath sounds bilaterally L>R  HEART: Regular rate and rhythm; No murmurs, rubs, or gallops  ABDOMEN: Soft, Nontender, Nondistended; Bowel sounds present; Peg intact  EXTREMITIES:  2+ Peripheral Pulses, No clubbing, cyanosis, or edema  LYMPH: No lymphadenopathy noted  SKIN: Sacral ulcer      LABS:  CBC Full  -  ( 04 Jun 2021 07:11 )  WBC Count : 7.43 K/uL  RBC Count : 2.75 M/uL  Hemoglobin : 9.9 g/dL  Hematocrit : 27.9 %  Platelet Count - Automated : 228 K/uL  Mean Cell Volume : 101.5 fl  Mean Cell Hemoglobin : 36.0 pg  Mean Cell Hemoglobin Concentration : 35.5 gm/dL  Auto Neutrophil # : x  Auto Lymphocyte # : x  Auto Monocyte # : x  Auto Eosinophil # : x  Auto Basophil # : x  Auto Neutrophil % : x  Auto Lymphocyte % : x  Auto Monocyte % : x  Auto Eosinophil % : x  Auto Basophil % : x    06-04    136  |  97  |  19<H>  ----------------------------<  105<H>  3.2<L>   |  34<H>  |  0.80    Ca    8.9      04 Jun 2021 07:11  Phos  2.6     06-04  Mg     2.0     06-04    TPro  6.5  /  Alb  3.1<L>  /  TBili  0.5  /  DBili  x   /  AST  25  /  ALT  41  /  AlkPhos  88  06-04              [  ]  DVT Prophylaxis  [  ]  Nutrition, Brand, Rate         Goal Rate        Abnormal Nutritional Status -  Malnutrition   Cachexia         RADIOLOGY & ADDITIONAL STUDIES:  ***  < from: CT Abdomen and Pelvis w/ IV Cont (05.27.21 @ 16:22) >  CT of the Abdomen and Pelvis was performed.  Sagittal and coronal reformats were performed.    FINDINGS:  LOWER CHEST: The left basilar pigtail drainage catheter has been removed. There is improved left lower lobe consolidation with residual volume loss and bronchiectasis. There is trace residual pneumothorax at the left lung base anteriorly and mild pneumomediastinum anteriorly.    LIVER: 8 mm cyst in segment 7 of the liver. Otherwise, within normal limits.  BILE DUCTS: Normal caliber.  GALLBLADDER: Within normal limits.  SPLEEN: Within normal limits.  PANCREAS: Within normal limits.  ADRENALS: Within normal limits.  KIDNEYS/URETERS: There is delayed nephrographic phase enhancement of the bilateral kidneys, suggestive of acute renal insufficiency. There is no hydronephrosis or perinephric stranding bilaterally.    BLADDER: Small bubble of nondependent gas in the minimally thickened bladder.  REPRODUCTIVE ORGANS: The prostate is not enlarged.    BOWEL: There is a G-tube in place. There is moderate concentric wall thickening and mucosal hyperenhancement of the distal rectum, suggestive of proctitis. No pneumatosis or extraluminal gas is identified. Otherwise, small and large bowel loops are unremarkable with no evidence of obstruction or inflammatory stranding of the adjacent mesenteric fat.  PERITONEUM: No ascites.  VESSELS: Within normal limits.  RETROPERITONEUM/LYMPH NODES: No lymphadenopathy.  ABDOMINAL WALL: Within normal limits.  BONES: Within normal limits.    IMPRESSION: Delayed nephrographic phase enhancement of the nonobstructed bilateral kidneys, suggestive of acute renal insufficiency. No evidence of bowel obstruction. Proctitis.        < end of copied text >    CXR from today improving. Better aeration of left lung.  Goals of Care Discussion with Family/Proxy/Other   - see note from 4/14

## 2021-06-04 NOTE — PROGRESS NOTE ADULT - ATTENDING COMMENTS
Problem/Plan - 1:  ·  Problem: COVID-19.  Plan: Wean ventilator support as tolerated - FiO2 @ 40%.   SBT daily - tolerated 10min of SBT with PS.   Continue to wean as tolerated.   Tracheal care  Decubitus prevention  Supplemental nutrition  Monitor oxygen sat  Vit C, D and zinc supp  Montelukast 10 mgs po Qhs  Daily CXR   G-tube with feeds  Replace lytes  Pulmonary toilet  DVT and GI PPX.      Problem/Plan - 2:  ·  Problem: Pneumothorax on left.  Plan: Ct chest noted  Thoracic sx follow up  chest tube removed 6/1.    for placement to vent facility   family given list of facilities

## 2021-06-04 NOTE — PROGRESS NOTE ADULT - ASSESSMENT
55M, no PMH, PSH appy and moody 1990s presented 3/14 with x9 days worsening cough, subjective fevers, and SOB, with x2-3 days dysuria and central, non-radiating, constant CP, admitted to [facility] for management of COVID PNA. Given discomfort of breathing, placed on 4L NC, with improvement. Labs notable for lymphopenia and neutrophilia despite WBC wnl, d-dimer 423, AST//114, lactate 3.3, and . Trop negative x1. +COVID. CXR notable for b/l infiltrates, L>R. Given x1 dose Tylenol and 1L IVF bolus in ED. Initially started on Remdesivir and Dexamethasone, Remdesivir was later discontinued secondary to positive antibodies and elevated LFT's. Not a  candidate for Tociluzimab due to elevated LFT. Patient continued to have respiratory distress requiring 15 L NRB support.    3/19/2021- Patient got transferred to ICU  due to increased work of breathing despite being on 15 L NRB . Patient was on HFNC to keep saturation >90%. Chest X ray showed Grossly stable mild left apical pneumothorax. Stable small pneumomediastinum. Soft tissue emphysema at the neck bases again noted. Stable patchy bilateral pulmonary infiltrates. Thoracic surgery consulted, no intervention at this time.  3/22/2021 - Chest X ray revealed trace ptx right and mild left pneumothorax  3/24/2021 - Overnight pt saturation remains in early 90s and late 80s . Pt remains on HFNC. CXR stable  3/25/2021- Chest X ray showed Overlying chin obscures lung apices. Questionable tiny left apical pneumothorax. Trial of semi- pulse steroid .. solumedrol 250 bid x 3 days   3/28- Started on solumedrol 40 mg bid  3/29/2021 - At 5.30 am, pt was in severe respiratory distress with tachypneic in 40s, tachycardic in 140s, saturating at high 70s, so decision was made to sedate and intubate the pt. Anesthesiologist intubated the patient. A central line, NGT  was placed  4/1/2021- Palliative care consulted. patient's brother/Idalgo ([phone]) has agreed to act as surrogate. Prednisone tapered to  once daily   4/6/2021- Left TLC placed, Hypertensive - s/p Lopressor 5 mg X 1  4/8/2021- Lasix daily and HCTz 50 mg added to aim for negative fluid balance, Left Chest tube placed, added Albumin (4/8-4/10)  4/11/2021-patient was having fever 100.8 rectal, s/p Tylenol , s/p 1 dose of vancomycin, on zosyn - New infiltrate on CXR ,likely developing VAP  4/13/2021- Continue to spike fevers, Zosyn switched to Meropenem. ID- Dr Anand following, new TLC placed  4/15/2021- Chest tuber discontinued   4/18/2021- BP running on lower side, Started on phenylephrine, Ibuprofen D/Connor , FiO2 increased to 70%Patient had large pneumothorax note don Left, thoracic surgery was consulted ,  left chest tube placed.  4/20/2021- Patient received 1 dose of Epifanio for vent asynchrony, A line placed, vent settings changed fro mPC to CVVC     4/21/2021- Trach placed  4/23/2021- PEG tube placed  4/25/2021 - cxr 4/25/21 with A 40% LEFT pneumothorax. LEFT multi-sidehole pigtail catheter overlies LEFT lower hemithorax.. Bilateral multifocal and diffuse ill-defined airspace opacities..  Follow-up AP portable chest radiograph 4/25/2021 AT 8:58 AM: Residual LEFT 30% upper zone pneumothorax.  4/26/2021- Patient noted to have hemoglobin drop to 6.8 from 7.5, will transfuse one unit of PRBC and monitor CBC.   4/27/2021- Patient's hemoglobin dropped again to 7, will give two unit of PRBC  4/28/2021- On 4/28 Hb 6.8, s/p 1 unit of PRBC hb 7.5 now. 4 PRBC total  CTH and CT abdomen w/o contrast no evidence of bleed  No active signs of bleeding (No hematemesis, melena or hematuria)  F/u hemolysis w/u- negative so far, elevated reticulocytes . Dr Andrade consulted  5/2/2021-Patients Hb remained stable, was tachypneic and breathing over the vent so 1 mg push of Dilaudid X 2 was given. V: 20/400/60%/3+ on precedex drip at 1.5.   5/3/2021- patient was found to have large gastric bubble, G tube was connected to wall suction, repeat CXR showed improvement.  5/5/2021 - Hemoglobin dropped from 8.8 to 7.4.Will add bactrim empirically as patient is on chronic prednisone for organizing pneumonia. Tube feeds were held because of diarrhea  5/6/2021- CT chest was done, which showed mild PTX. Left 3rd intercostal space Chest tube was placed. Patient was hypotensive overnight. Cristiano temperature. Was septic, will send cultures if spikes temperature again. Patient was retaining urine, andrea was placed overnight. Will start on albumin and give one dose of lasix as patient was hypotensive and edematous in upper extremities, also had low albumin. Patient was getting agitated, will give ketamine IV push and monitor if it helps.  5/10/2021- Pt tachycardic overnight and hypertensive to 228/116 w/p 1x lopressor. Stopped levophed. Pt became hypotensive. Then started phenylephrine. BP now stable. Will d/c methadone and seroquel and continue on precedex for sedation. CXR showing apical pneumothorax   5/13/2021- Pt tachycardic to 160-170 yesterday afternoon and night. Pupils dilated. Did not respond to increased doses of opiates and benzos. Developed fever to 102.3 overnight with episode of vomiting. UA was positive. started empirically on vanc/zosyn - will d/c for now as no clear source of infection. AM abdominal xray w/ likely ileus. Will restart Free water. Hold Lasix. CT abdomen done showing ileus and possible fecal matter in rectum. Fecal disimpaction performed, small ball of stool retrieved with liquid stool excreted subsequently. Spoke to Surgery RUBI Wood. Tube feeds stopped and PEG tube placed to suction with no output.. Also will restart Precedex as likely in withdrawal and clonid  5/18/2021- Patient was tachy and agitated. Later he had hypotension and bradycardia. He was given 1L bolus and was restarted on pheny after which he improved  5/25- Behavioral health consulted due to difficultyn weaning off sedation . Recommended C/w Klonopin to 1 mg q8h standing (no PRNs), with plan to further taper as tolerated. C/w Seroquel 50 mg BID standing  5/26/2021- BP was elevated, was started on labetalol. d/c andrea.  CT placed on water seal, c/w water seal  5/27/2021- Vomiting X2, held feeding, CT Abdomen/Pelvis ordered  5/28/2021- Placed on trach collar today  5/30/2021- Patient was on trach collar throughout the day yesterday and placed back on vent overnight. No other acute events overnight.    5/31/2021- Decreased Methadone to 5 mg daily and Klonopin to 0.5 mg every 8 hours. Plan was made to transfer patient to SCU  6/1 Abd discomfort. AXR results as above, no bowel obstruction. Resume PEG feedings.   6/1 TC trial. Pt tolerated only 10 minutes, RR ~30's and anxious.  Will attempt SBT with PS.   6/3  Only tolerated 10 minutes of BSBT with PS 5. Became diaphoretic and c/o SOB

## 2021-06-04 NOTE — PROGRESS NOTE ADULT - PROBLEM SELECTOR PLAN 2
Slowly improving.  CXR improved today.  Continue mechanical ventilation SBT as tolerated.  Serial CXRs  Monitor oxygen saturation.

## 2021-06-04 NOTE — PROGRESS NOTE ADULT - PROBLEM SELECTOR PLAN 10
DVT and GI prophylaxis.  Will need vent facility.  SW aware. List given to family.  Continue daily SBT  Overall prognosis is guarded.

## 2021-06-04 NOTE — PROGRESS NOTE ADULT - PROBLEM SELECTOR PLAN 1
S/p Dexamethasone , No Remdesivir  due to positive antibodies   CXR results as above  Continue Bactrim for empiric coverage   Continue with prednisone taper  Continue inhalers  Continue ventilatory support.   Will continue SBT with PS during the day as tolerated. Only tolerated 10 minutes. After 10 minutes became tachypneic.  Full vent support overnight  Monitor oxygenation saturation.

## 2021-06-04 NOTE — PROGRESS NOTE ADULT - SUBJECTIVE AND OBJECTIVE BOX
Pt is awake, alert, lying in bed. Still with ventilator support via trach.     INTERVAL HPI/OVERNIGHT EVENTS:      VITAL SIGNS:  T(F): 97.9 (06-04-21 @ 06:00)  HR: 86 (06-04-21 @ 06:00)  BP: 132/78 (06-04-21 @ 06:00)  RR: 19 (06-04-21 @ 06:00)  SpO2: 97% (06-04-21 @ 06:00)  Wt(kg): --  I&O's Detail    03 Jun 2021 07:01  -  04 Jun 2021 07:00  --------------------------------------------------------  IN:  Total IN: 0 mL    OUT:    Voided (mL): 600 mL  Total OUT: 600 mL    Total NET: -600 mL        Mode: AC/ CMV (Assist Control/ Continuous Mandatory Ventilation)  RR (machine): 18  TV (machine): 450  FiO2: 40  PEEP: 5  ITime: 1  MAP: 9  PIP: 21        REVIEW OF SYSTEMS:    CONSTITUTIONAL:  No fevers, chills, sweats    HEENT:  Eyes:  No diplopia or blurred vision. ENT:  No earache, sore throat or runny nose.    CARDIOVASCULAR:  No pressure, squeezing, tightness, or heaviness about the chest; no palpitations.    RESPIRATORY:  Per HPI    GASTROINTESTINAL:  No abdominal pain, nausea, vomiting or diarrhea.    GENITOURINARY:  No dysuria, frequency or urgency.    NEUROLOGIC:  No paresthesias, fasciculations, seizures or weakness.    PSYCHIATRIC:  No disorder of thought or mood.      PHYSICAL EXAM:    Constitutional: Well developed and nourished  Eyes:Perrla  ENMT: normal  Neck:supple  Respiratory: good air entry; trach.   Cardiovascular: S1 S2 regular  Gastrointestinal: Soft, Non tender  Extremities: No edema  Vascular:normal  Neurological:Awake, alert,Ox3  Musculoskeletal: bed weak.       MEDICATIONS  (STANDING):  ascorbic acid 1000 milliGRAM(s) Oral daily  BACItracin   Ointment 1 Application(s) Topical two times a day  calcium carbonate 1250 mG  + Vitamin D (OsCal 500 + D) 1 Tablet(s) Oral daily  chlorhexidine 2% Cloths 1 Application(s) Topical <User Schedule>  cholecalciferol 1000 Unit(s) Oral daily  clonazePAM  Tablet 0.5 milliGRAM(s) Oral every 12 hours  enoxaparin Injectable 40 milliGRAM(s) SubCutaneous every 24 hours  gabapentin 100 milliGRAM(s) Oral every 8 hours  labetalol 100 milliGRAM(s) Enteral Tube two times a day  methadone    Tablet 5 milliGRAM(s) Oral daily  naloxegol 12.5 milliGRAM(s) Oral daily  pantoprazole  Injectable 40 milliGRAM(s) IV Push daily  potassium chloride   Powder 40 milliEquivalent(s) Oral once  predniSONE   Tablet 15 milliGRAM(s) Oral daily  QUEtiapine 50 milliGRAM(s) Oral two times a day  senna 2 Tablet(s) Oral at bedtime  trimethoprim  40 mG/sulfamethoxazole 200 mG Suspension 160 milliGRAM(s) Oral <User Schedule>    MEDICATIONS  (PRN):  acetaminophen    Suspension .. 650 milliGRAM(s) Enteral Tube every 12 hours PRN Temp greater or equal to 38C (100.4F), Mild Pain (1 - 3)  ALBUTerol    90 MICROgram(s) HFA Inhaler 2 Puff(s) Inhalation every 6 hours PRN Shortness of Breath and/or Wheezing  artificial  tears Solution 1 Drop(s) Both EYES every 4 hours PRN Dry Eyes  bisacodyl Suppository 10 milliGRAM(s) Rectal daily PRN Constipation  ondansetron Injectable 4 milliGRAM(s) IV Push every 6 hours PRN Nausea and/or Vomiting  polyethylene glycol 3350 17 Gram(s) Oral daily PRN Constipation  sodium chloride 0.9% lock flush 10 milliLiter(s) IV Push every 1 hour PRN Pre/post blood products, medications, blood draw, and to maintain line patency      Allergies    No Known Allergies    Intolerances        LABS:                        9.9    7.43  )-----------( 228      ( 04 Jun 2021 07:11 )             27.9     06-04    136  |  97  |  19<H>  ----------------------------<  105<H>  3.2<L>   |  34<H>  |  0.80    Ca    8.9      04 Jun 2021 07:11  Phos  2.6     06-04  Mg     2.0     06-04    TPro  6.5  /  Alb  3.1<L>  /  TBili  0.5  /  DBili  x   /  AST  25  /  ALT  41  /  AlkPhos  88  06-04              CAPILLARY BLOOD GLUCOSE      POCT Blood Glucose.: 121 mg/dL (04 Jun 2021 06:06)  POCT Blood Glucose.: 114 mg/dL (03 Jun 2021 23:33)  POCT Blood Glucose.: 132 mg/dL (03 Jun 2021 16:50)  POCT Blood Glucose.: 150 mg/dL (03 Jun 2021 12:19)        RADIOLOGY & ADDITIONAL TESTS:    CXR:    Ct scan chest:    ekg;    echo:

## 2021-06-04 NOTE — PROGRESS NOTE ADULT - PROBLEM SELECTOR PLAN 7
S/P 4 PRBC total    Hg stable > 8  CTH and CT abdomen w/o contrast no evidence of bleed    No active signs of bleeding (No hematemesis, melena or hematuria)  Hemolysis w/u- negative  Iron studies show ACD  Monitor CBC .   Transfuse Hgb <8  Dr Andrade on board.

## 2021-06-04 NOTE — PROGRESS NOTE ADULT - PROBLEM SELECTOR PLAN 4
Improved.   Abd xray results as above  Continue PEG tube feedings  Continue with bowel regimen  Continue Naloxegol  Continue with Zofran prn  Serial abdominal exam  Monitor PEG residual.

## 2021-06-04 NOTE — PROGRESS NOTE ADULT - PROBLEM SELECTOR PLAN 1
Wean ventilator support as tolerated - FiO2 @ 40%.   SBT daily - tolerated 10min of SBT with PS.   Continue to wean as tolerated.   Tracheal care  Decubitus prevention  Supplemental nutrition  Monitor oxygen sat  Vit C, D and zinc supp  Montelukast 10 mgs po Qhs  Daily CXR   G-tube with feeds  Replace lytes  Pulmonary toilet  DVT and GI PPX.

## 2021-06-04 NOTE — PROGRESS NOTE ADULT - ATTENDING COMMENTS
Problem/Plan - 1:  ·  Problem: Acute hypoxemic respiratory failure due to COVID-19.  Plan: S/p Dexamethasone , No Remdesivir  due to positive antibodies   CXR results as above  Continue Bactrim for empiric coverage   Continue with prednisone taper  Continue inhalers  Continue ventilatory support.   Will continue SBT with PS during the day as tolerated. Only tolerated 10 minutes. After 10 minutes became tachypneic.  Full vent support overnight  Monitor oxygenation saturation.      Problem/Plan - 2:  ·  Problem: Acute respiratory distress syndrome (ARDS) due to COVID-19 virus.  Plan: Slowly improving.  CXR improved today.  Continue mechanical ventilation SBT as tolerated.  Serial CXRs  Monitor oxygen saturation.   daily wean trial   for placement to vent facility

## 2021-06-05 DIAGNOSIS — E83.39 OTHER DISORDERS OF PHOSPHORUS METABOLISM: ICD-10-CM

## 2021-06-05 LAB
ALBUMIN SERPL ELPH-MCNC: 3 G/DL — LOW (ref 3.5–5)
ALP SERPL-CCNC: 91 U/L — SIGNIFICANT CHANGE UP (ref 40–120)
ALT FLD-CCNC: 46 U/L DA — SIGNIFICANT CHANGE UP (ref 10–60)
ANION GAP SERPL CALC-SCNC: 8 MMOL/L — SIGNIFICANT CHANGE UP (ref 5–17)
AST SERPL-CCNC: 23 U/L — SIGNIFICANT CHANGE UP (ref 10–40)
BILIRUB SERPL-MCNC: 0.5 MG/DL — SIGNIFICANT CHANGE UP (ref 0.2–1.2)
BUN SERPL-MCNC: 23 MG/DL — HIGH (ref 7–18)
CALCIUM SERPL-MCNC: 8.6 MG/DL — SIGNIFICANT CHANGE UP (ref 8.4–10.5)
CHLORIDE SERPL-SCNC: 98 MMOL/L — SIGNIFICANT CHANGE UP (ref 96–108)
CO2 SERPL-SCNC: 30 MMOL/L — SIGNIFICANT CHANGE UP (ref 22–31)
CREAT SERPL-MCNC: 0.87 MG/DL — SIGNIFICANT CHANGE UP (ref 0.5–1.3)
GLUCOSE BLDC GLUCOMTR-MCNC: 104 MG/DL — HIGH (ref 70–99)
GLUCOSE BLDC GLUCOMTR-MCNC: 119 MG/DL — HIGH (ref 70–99)
GLUCOSE BLDC GLUCOMTR-MCNC: 120 MG/DL — HIGH (ref 70–99)
GLUCOSE BLDC GLUCOMTR-MCNC: 126 MG/DL — HIGH (ref 70–99)
GLUCOSE BLDC GLUCOMTR-MCNC: 139 MG/DL — HIGH (ref 70–99)
GLUCOSE SERPL-MCNC: 129 MG/DL — HIGH (ref 70–99)
HCT VFR BLD CALC: 31.1 % — LOW (ref 39–50)
HGB BLD-MCNC: 10.4 G/DL — LOW (ref 13–17)
MAGNESIUM SERPL-MCNC: 1.8 MG/DL — SIGNIFICANT CHANGE UP (ref 1.6–2.6)
MCHC RBC-ENTMCNC: 33.4 GM/DL — SIGNIFICANT CHANGE UP (ref 32–36)
MCHC RBC-ENTMCNC: 34.1 PG — HIGH (ref 27–34)
MCV RBC AUTO: 102 FL — HIGH (ref 80–100)
NRBC # BLD: 0 /100 WBCS — SIGNIFICANT CHANGE UP (ref 0–0)
PHOSPHATE SERPL-MCNC: 1.9 MG/DL — LOW (ref 2.5–4.5)
PLATELET # BLD AUTO: 225 K/UL — SIGNIFICANT CHANGE UP (ref 150–400)
POTASSIUM SERPL-MCNC: 4.3 MMOL/L — SIGNIFICANT CHANGE UP (ref 3.5–5.3)
POTASSIUM SERPL-SCNC: 4.3 MMOL/L — SIGNIFICANT CHANGE UP (ref 3.5–5.3)
PROT SERPL-MCNC: 6.8 G/DL — SIGNIFICANT CHANGE UP (ref 6–8.3)
RBC # BLD: 3.05 M/UL — LOW (ref 4.2–5.8)
RBC # FLD: 15.4 % — HIGH (ref 10.3–14.5)
SODIUM SERPL-SCNC: 136 MMOL/L — SIGNIFICANT CHANGE UP (ref 135–145)
WBC # BLD: 8.85 K/UL — SIGNIFICANT CHANGE UP (ref 3.8–10.5)
WBC # FLD AUTO: 8.85 K/UL — SIGNIFICANT CHANGE UP (ref 3.8–10.5)

## 2021-06-05 RX ORDER — POTASSIUM PHOSPHATE, MONOBASIC POTASSIUM PHOSPHATE, DIBASIC 236; 224 MG/ML; MG/ML
15 INJECTION, SOLUTION INTRAVENOUS ONCE
Refills: 0 | Status: COMPLETED | OUTPATIENT
Start: 2021-06-05 | End: 2021-06-05

## 2021-06-05 RX ADMIN — Medication 1 APPLICATION(S): at 18:47

## 2021-06-05 RX ADMIN — CHLORHEXIDINE GLUCONATE 1 APPLICATION(S): 213 SOLUTION TOPICAL at 05:12

## 2021-06-05 RX ADMIN — Medication 1 APPLICATION(S): at 05:12

## 2021-06-05 RX ADMIN — Medication 1 TABLET(S): at 11:20

## 2021-06-05 RX ADMIN — Medication 100 MILLIGRAM(S): at 18:46

## 2021-06-05 RX ADMIN — Medication 0.5 MILLIGRAM(S): at 05:16

## 2021-06-05 RX ADMIN — SENNA PLUS 2 TABLET(S): 8.6 TABLET ORAL at 22:38

## 2021-06-05 RX ADMIN — POTASSIUM PHOSPHATE, MONOBASIC POTASSIUM PHOSPHATE, DIBASIC 62.5 MILLIMOLE(S): 236; 224 INJECTION, SOLUTION INTRAVENOUS at 14:58

## 2021-06-05 RX ADMIN — QUETIAPINE FUMARATE 50 MILLIGRAM(S): 200 TABLET, FILM COATED ORAL at 22:38

## 2021-06-05 RX ADMIN — METHADONE HYDROCHLORIDE 5 MILLIGRAM(S): 40 TABLET ORAL at 11:20

## 2021-06-05 RX ADMIN — GABAPENTIN 100 MILLIGRAM(S): 400 CAPSULE ORAL at 05:16

## 2021-06-05 RX ADMIN — POLYETHYLENE GLYCOL 3350 17 GRAM(S): 17 POWDER, FOR SOLUTION ORAL at 11:22

## 2021-06-05 RX ADMIN — GABAPENTIN 100 MILLIGRAM(S): 400 CAPSULE ORAL at 22:41

## 2021-06-05 RX ADMIN — GABAPENTIN 100 MILLIGRAM(S): 400 CAPSULE ORAL at 14:58

## 2021-06-05 RX ADMIN — Medication 1000 MILLIGRAM(S): at 11:20

## 2021-06-05 RX ADMIN — Medication 100 MILLIGRAM(S): at 05:13

## 2021-06-05 RX ADMIN — QUETIAPINE FUMARATE 50 MILLIGRAM(S): 200 TABLET, FILM COATED ORAL at 11:20

## 2021-06-05 RX ADMIN — Medication 0.5 MILLIGRAM(S): at 18:46

## 2021-06-05 RX ADMIN — ENOXAPARIN SODIUM 40 MILLIGRAM(S): 100 INJECTION SUBCUTANEOUS at 11:20

## 2021-06-05 RX ADMIN — Medication 1000 UNIT(S): at 11:20

## 2021-06-05 RX ADMIN — NALOXEGOL OXALATE 12.5 MILLIGRAM(S): 12.5 TABLET, FILM COATED ORAL at 11:21

## 2021-06-05 RX ADMIN — Medication 15 MILLIGRAM(S): at 05:13

## 2021-06-05 RX ADMIN — PANTOPRAZOLE SODIUM 40 MILLIGRAM(S): 20 TABLET, DELAYED RELEASE ORAL at 11:21

## 2021-06-05 NOTE — PROGRESS NOTE ADULT - ATTENDING COMMENTS
Problem/Plan - 1:  ·  Problem: Acute hypoxemic respiratory failure due to COVID-19.  Plan: S/p Dexamethasone , No Remdesivir  due to positive antibodies   CXR results as above  Continue Bactrim for empiric coverage   Continue with prednisone taper  Continue inhalers  Continue ventilatory support.   Will continue SBT with PS during the day as tolerated. Tolerated 2.5 hours today.  Full vent support overnight  Monitor oxygenation saturation.      Problem/Plan - 2:  ·  Problem: Acute respiratory distress syndrome (ARDS) due to COVID-19 virus.  Plan: Slowly improving.  CXR improved today.  Continue mechanical ventilation SBT as tolerated.  Serial CXRs  Monitor oxygen saturation.   for placement to a vent facility  family given choices

## 2021-06-05 NOTE — PROGRESS NOTE ADULT - PROBLEM SELECTOR PLAN 6
<<----- Click to add NO significant Past Surgical History Phos replacement underway.  Continue to monitor.

## 2021-06-05 NOTE — PROGRESS NOTE ADULT - SUBJECTIVE AND OBJECTIVE BOX
ARANZA CHAMBERS    SCU progress note    INTERVAL HPI/OVERNIGHT EVENTS: ***No overnight events.    DNR [ ]   DNI  [  ]   FULL CODE    Covid - 19 PCR: Negative 6/1    The 4Ms    What Matters Most: see GOC  Age appropriate Medications/Screen for High Risk Medication: Yes  Mentation: see CAM below  Mobility: defer to physical exam    The Confusion Assessment Method (CAM) Diagnostic Algorithm     1: Acute Onset or Fluctuating Course  - Is there evidence of an acute change in mental status from the patient’s baseline? Did the (abnormal) behavior  fluctuate during the day, that is, tend to come and go, or increase and decrease in severity?  [ ] YES [x ] NO     2: Inattention  - Did the patient have difficulty focusing attention, being easily distractible, or having difficulty keeping track of what was being said?  [ ] YES [x ] NO     3: Disorganized thinking  -Was the patient’s thinking disorganized or incoherent, such as rambling or irrelevant conversation, unclear or illogical flow of ideas, or unpredictable switching from subject to subject?  [ ] YES [x ] NO    4: Altered Level of consciousness?  [ ] YES [x ] NO    The diagnosis of delirium by CAM requires the presence of features 1 and 2 and either 3 or 4.    PRESSORS: [ ] YES [x ] NO  trimethoprim  40 mG/sulfamethoxazole 200 mG Suspension 160 milliGRAM(s) Oral <User Schedule>    Cardiovascular:  Heart Failure  Acute   Acute on Chronic  Chronic       labetalol 100 milliGRAM(s) Enteral Tube two times a day    Pulmonary:  ALBUTerol    90 MICROgram(s) HFA Inhaler 2 Puff(s) Inhalation every 6 hours PRN    Hematalogic:  enoxaparin Injectable 40 milliGRAM(s) SubCutaneous every 24 hours    Other:  acetaminophen    Suspension .. 650 milliGRAM(s) Enteral Tube every 12 hours PRN  artificial  tears Solution 1 Drop(s) Both EYES every 4 hours PRN  ascorbic acid 1000 milliGRAM(s) Oral daily  BACItracin   Ointment 1 Application(s) Topical two times a day  bisacodyl Suppository 10 milliGRAM(s) Rectal daily PRN  calcium carbonate 1250 mG  + Vitamin D (OsCal 500 + D) 1 Tablet(s) Oral daily  chlorhexidine 2% Cloths 1 Application(s) Topical <User Schedule>  cholecalciferol 1000 Unit(s) Oral daily  clonazePAM  Tablet 0.5 milliGRAM(s) Oral every 12 hours  gabapentin 100 milliGRAM(s) Oral every 8 hours  methadone    Tablet 5 milliGRAM(s) Oral daily  naloxegol 12.5 milliGRAM(s) Oral daily  ondansetron Injectable 4 milliGRAM(s) IV Push every 6 hours PRN  pantoprazole  Injectable 40 milliGRAM(s) IV Push daily  polyethylene glycol 3350 17 Gram(s) Oral daily PRN  potassium phosphate IVPB 15 milliMole(s) IV Intermittent once  predniSONE   Tablet 15 milliGRAM(s) Oral daily  QUEtiapine 50 milliGRAM(s) Oral two times a day  senna 2 Tablet(s) Oral at bedtime  sodium chloride 0.9% lock flush 10 milliLiter(s) IV Push every 1 hour PRN    acetaminophen    Suspension .. 650 milliGRAM(s) Enteral Tube every 12 hours PRN  ALBUTerol    90 MICROgram(s) HFA Inhaler 2 Puff(s) Inhalation every 6 hours PRN  artificial  tears Solution 1 Drop(s) Both EYES every 4 hours PRN  ascorbic acid 1000 milliGRAM(s) Oral daily  BACItracin   Ointment 1 Application(s) Topical two times a day  bisacodyl Suppository 10 milliGRAM(s) Rectal daily PRN  calcium carbonate 1250 mG  + Vitamin D (OsCal 500 + D) 1 Tablet(s) Oral daily  chlorhexidine 2% Cloths 1 Application(s) Topical <User Schedule>  cholecalciferol 1000 Unit(s) Oral daily  clonazePAM  Tablet 0.5 milliGRAM(s) Oral every 12 hours  enoxaparin Injectable 40 milliGRAM(s) SubCutaneous every 24 hours  gabapentin 100 milliGRAM(s) Oral every 8 hours  labetalol 100 milliGRAM(s) Enteral Tube two times a day  methadone    Tablet 5 milliGRAM(s) Oral daily  naloxegol 12.5 milliGRAM(s) Oral daily  ondansetron Injectable 4 milliGRAM(s) IV Push every 6 hours PRN  pantoprazole  Injectable 40 milliGRAM(s) IV Push daily  polyethylene glycol 3350 17 Gram(s) Oral daily PRN  potassium phosphate IVPB 15 milliMole(s) IV Intermittent once  predniSONE   Tablet 15 milliGRAM(s) Oral daily  QUEtiapine 50 milliGRAM(s) Oral two times a day  senna 2 Tablet(s) Oral at bedtime  sodium chloride 0.9% lock flush 10 milliLiter(s) IV Push every 1 hour PRN  trimethoprim  40 mG/sulfamethoxazole 200 mG Suspension 160 milliGRAM(s) Oral <User Schedule>    Drug Dosing Weight  Height (cm): 167.6 (23 Apr 2021 23:15)  Weight (kg): 83.9 (23 Apr 2021 23:15)  BMI (kg/m2): 29.9 (23 Apr 2021 23:15)  BSA (m2): 1.93 (23 Apr 2021 23:15)    CENTRAL LINE: [ ] YES [x ] NO  LOCATION:   DATE INSERTED:  REMOVE: [ ] YES [ ] NO  EXPLAIN:    RIVERA: [ ] YES [x ] NO    DATE INSERTED:  REMOVE:  [ ] YES [ ] NO  EXPLAIN:    PAST MEDICAL & SURGICAL HISTORY:  No pertinent past medical history    S/P moody  1990s    S/P appendectomy  1990s                  Mode: PS (Pressure Support)/ Spontaneous  FiO2: 40  PEEP: 5  PS: 15  ITime: 1  PIP: 16      PHYSICAL EXAM:    GENERAL: NAD, well-groomed, well-developed  HEAD:  Atraumatic, Normocephalic  EYES: EOMI, PERRLA, conjunctiva and sclera clear  ENMT: No tonsillar erythema, exudates  NECK: Supple, No JVD, tracheostomy intact  NERVOUS SYSTEM:  Alert & Oriented X3, Follows commands, moving all extremities  CHEST/LUNG: Diminished breath sounds bilaterally L>R  HEART: Regular rate and rhythm; No murmurs, rubs, or gallops  ABDOMEN: Soft, Nontender, Nondistended; Bowel sounds present; Peg intact  EXTREMITIES:  2+ Peripheral Pulses, No clubbing, cyanosis, or edema  LYMPH: No lymphadenopathy noted  SKIN: Sacral ulcer      LABS:  CBC Full  -  ( 05 Jun 2021 09:56 )  WBC Count : 8.85 K/uL  RBC Count : 3.05 M/uL  Hemoglobin : 10.4 g/dL  Hematocrit : 31.1 %  Platelet Count - Automated : 225 K/uL  Mean Cell Volume : 102.0 fl  Mean Cell Hemoglobin : 34.1 pg  Mean Cell Hemoglobin Concentration : 33.4 gm/dL  Auto Neutrophil # : x  Auto Lymphocyte # : x  Auto Monocyte # : x  Auto Eosinophil # : x  Auto Basophil # : x  Auto Neutrophil % : x  Auto Lymphocyte % : x  Auto Monocyte % : x  Auto Eosinophil % : x  Auto Basophil % : x    06-05    136  |  98  |  23<H>  ----------------------------<  129<H>  4.3   |  30  |  0.87    Ca    8.6      05 Jun 2021 09:56  Phos  1.9     06-05  Mg     1.8     06-05    TPro  6.8  /  Alb  3.0<L>  /  TBili  0.5  /  DBili  x   /  AST  23  /  ALT  46  /  AlkPhos  91  06-05              [  ]  DVT Prophylaxis  [  ]  Nutrition, Brand, Rate         Goal Rate        Abnormal Nutritional Status -  Malnutrition   Cachexia          RADIOLOGY & ADDITIONAL STUDIES:  ***  < from: Xray Chest 1 View- PORTABLE-Routine (Xray Chest 1 View- PORTABLE-Routine in AM.) (06.04.21 @ 09:37) >  Frontal expiratory view of the chest shows the heart to be similar in size. Tracheostomy tube is again noted.    The lungs show slight clearing of patchy left infiltrates and there is no evidence of pneumothorax nor pleural effusion.    IMPRESSION:  Partial clearing, left lung.    < end of copied text >  < from: Xray Abdomen 1 View PORTABLE -Routine (Xray Abdomen 1 View PORTABLE -Routine in AM.) (05.31.21 @ 08:49) >  Impression: Status post tracheostomy. Status post left chest pigtail catheter. Trace left pneumothorax is unchanged. Cardiomegaly. Moderate to severe multifocal patchy opacificationof both lungs, left greater than right, is unchanged.    Nonobstructive bowel gas pattern. No dilated loops of small or large bowel.    < end of copied text >    Goals of Care Discussion with Family/Proxy/Other   - see note from 4/14     ARANZA CHAMBERS    SCU progress note    INTERVAL HPI/OVERNIGHT EVENTS: ***No overnight events.    DNR [ ]   DNI  [  ]   FULL CODE    Covid - 19 PCR: Negative 6/1    The 4Ms    What Matters Most: see GOC  Age appropriate Medications/Screen for High Risk Medication: Yes  Mentation: see CAM below  Mobility: defer to physical exam    The Confusion Assessment Method (CAM) Diagnostic Algorithm     1: Acute Onset or Fluctuating Course  - Is there evidence of an acute change in mental status from the patient’s baseline? Did the (abnormal) behavior  fluctuate during the day, that is, tend to come and go, or increase and decrease in severity?  [ ] YES [x ] NO     2: Inattention  - Did the patient have difficulty focusing attention, being easily distractible, or having difficulty keeping track of what was being said?  [ ] YES [x ] NO     3: Disorganized thinking  -Was the patient’s thinking disorganized or incoherent, such as rambling or irrelevant conversation, unclear or illogical flow of ideas, or unpredictable switching from subject to subject?  [ ] YES [x ] NO    4: Altered Level of consciousness?  [ ] YES [x ] NO    The diagnosis of delirium by CAM requires the presence of features 1 and 2 and either 3 or 4.    PRESSORS: [ ] YES [x ] NO  trimethoprim  40 mG/sulfamethoxazole 200 mG Suspension 160 milliGRAM(s) Oral <User Schedule>    Cardiovascular:  Heart Failure  Acute   Acute on Chronic  Chronic       labetalol 100 milliGRAM(s) Enteral Tube two times a day    Pulmonary:  ALBUTerol    90 MICROgram(s) HFA Inhaler 2 Puff(s) Inhalation every 6 hours PRN    Hematalogic:  enoxaparin Injectable 40 milliGRAM(s) SubCutaneous every 24 hours    Other:  acetaminophen    Suspension .. 650 milliGRAM(s) Enteral Tube every 12 hours PRN  artificial  tears Solution 1 Drop(s) Both EYES every 4 hours PRN  ascorbic acid 1000 milliGRAM(s) Oral daily  BACItracin   Ointment 1 Application(s) Topical two times a day  bisacodyl Suppository 10 milliGRAM(s) Rectal daily PRN  calcium carbonate 1250 mG  + Vitamin D (OsCal 500 + D) 1 Tablet(s) Oral daily  chlorhexidine 2% Cloths 1 Application(s) Topical <User Schedule>  cholecalciferol 1000 Unit(s) Oral daily  clonazePAM  Tablet 0.5 milliGRAM(s) Oral every 12 hours  gabapentin 100 milliGRAM(s) Oral every 8 hours  methadone    Tablet 5 milliGRAM(s) Oral daily  naloxegol 12.5 milliGRAM(s) Oral daily  ondansetron Injectable 4 milliGRAM(s) IV Push every 6 hours PRN  pantoprazole  Injectable 40 milliGRAM(s) IV Push daily  polyethylene glycol 3350 17 Gram(s) Oral daily PRN  potassium phosphate IVPB 15 milliMole(s) IV Intermittent once  predniSONE   Tablet 15 milliGRAM(s) Oral daily  QUEtiapine 50 milliGRAM(s) Oral two times a day  senna 2 Tablet(s) Oral at bedtime  sodium chloride 0.9% lock flush 10 milliLiter(s) IV Push every 1 hour PRN    acetaminophen    Suspension .. 650 milliGRAM(s) Enteral Tube every 12 hours PRN  ALBUTerol    90 MICROgram(s) HFA Inhaler 2 Puff(s) Inhalation every 6 hours PRN  artificial  tears Solution 1 Drop(s) Both EYES every 4 hours PRN  ascorbic acid 1000 milliGRAM(s) Oral daily  BACItracin   Ointment 1 Application(s) Topical two times a day  bisacodyl Suppository 10 milliGRAM(s) Rectal daily PRN  calcium carbonate 1250 mG  + Vitamin D (OsCal 500 + D) 1 Tablet(s) Oral daily  chlorhexidine 2% Cloths 1 Application(s) Topical <User Schedule>  cholecalciferol 1000 Unit(s) Oral daily  clonazePAM  Tablet 0.5 milliGRAM(s) Oral every 12 hours  enoxaparin Injectable 40 milliGRAM(s) SubCutaneous every 24 hours  gabapentin 100 milliGRAM(s) Oral every 8 hours  labetalol 100 milliGRAM(s) Enteral Tube two times a day  methadone    Tablet 5 milliGRAM(s) Oral daily  naloxegol 12.5 milliGRAM(s) Oral daily  ondansetron Injectable 4 milliGRAM(s) IV Push every 6 hours PRN  pantoprazole  Injectable 40 milliGRAM(s) IV Push daily  polyethylene glycol 3350 17 Gram(s) Oral daily PRN  potassium phosphate IVPB 15 milliMole(s) IV Intermittent once  predniSONE   Tablet 15 milliGRAM(s) Oral daily  QUEtiapine 50 milliGRAM(s) Oral two times a day  senna 2 Tablet(s) Oral at bedtime  sodium chloride 0.9% lock flush 10 milliLiter(s) IV Push every 1 hour PRN  trimethoprim  40 mG/sulfamethoxazole 200 mG Suspension 160 milliGRAM(s) Oral <User Schedule>    Drug Dosing Weight  Height (cm): 167.6 (23 Apr 2021 23:15)  Weight (kg): 83.9 (23 Apr 2021 23:15)  BMI (kg/m2): 29.9 (23 Apr 2021 23:15)  BSA (m2): 1.93 (23 Apr 2021 23:15)    CENTRAL LINE: [ ] YES [x ] NO  LOCATION:   DATE INSERTED:  REMOVE: [ ] YES [ ] NO  EXPLAIN:    RIVERA: [ ] YES [x ] NO    DATE INSERTED:  REMOVE:  [ ] YES [ ] NO  EXPLAIN:    PAST MEDICAL & SURGICAL HISTORY:  No pertinent past medical history    S/P moody  1990s    S/P appendectomy  1990s                  Mode: PS (Pressure Support)/ Spontaneous  FiO2: 40  PEEP: 5  PS: 15  ITime: 1  PIP: 16      PHYSICAL EXAM:    GENERAL: NAD, well-groomed, well-developed  HEAD:  Atraumatic, Normocephalic  EYES: EOMI, PERRLA, conjunctiva and sclera clear  ENMT: No tonsillar erythema, exudates  NECK: Supple, No JVD, tracheostomy intact  NERVOUS SYSTEM:  Alert & Oriented X3, Follows commands, moving all extremities  CHEST/LUNG: Diminished breath sounds bilaterally L>R  HEART: Regular rate and rhythm; No murmurs, rubs, or gallops  ABDOMEN: Soft, Nontender, Nondistended; Bowel sounds present; Peg intact  EXTREMITIES:  2+ Peripheral Pulses, No clubbing, cyanosis, or edema  LYMPH: No lymphadenopathy noted  SKIN: Sacral ulcer, Stage 2/3 left hand      LABS:  CBC Full  -  ( 05 Jun 2021 09:56 )  WBC Count : 8.85 K/uL  RBC Count : 3.05 M/uL  Hemoglobin : 10.4 g/dL  Hematocrit : 31.1 %  Platelet Count - Automated : 225 K/uL  Mean Cell Volume : 102.0 fl  Mean Cell Hemoglobin : 34.1 pg  Mean Cell Hemoglobin Concentration : 33.4 gm/dL  Auto Neutrophil # : x  Auto Lymphocyte # : x  Auto Monocyte # : x  Auto Eosinophil # : x  Auto Basophil # : x  Auto Neutrophil % : x  Auto Lymphocyte % : x  Auto Monocyte % : x  Auto Eosinophil % : x  Auto Basophil % : x    06-05    136  |  98  |  23<H>  ----------------------------<  129<H>  4.3   |  30  |  0.87    Ca    8.6      05 Jun 2021 09:56  Phos  1.9     06-05  Mg     1.8     06-05    TPro  6.8  /  Alb  3.0<L>  /  TBili  0.5  /  DBili  x   /  AST  23  /  ALT  46  /  AlkPhos  91  06-05              [  ]  DVT Prophylaxis  [  ]  Nutrition, Brand, Rate         Goal Rate        Abnormal Nutritional Status -  Malnutrition   Cachexia          RADIOLOGY & ADDITIONAL STUDIES:  ***  < from: Xray Chest 1 View- PORTABLE-Routine (Xray Chest 1 View- PORTABLE-Routine in AM.) (06.04.21 @ 09:37) >  Frontal expiratory view of the chest shows the heart to be similar in size. Tracheostomy tube is again noted.    The lungs show slight clearing of patchy left infiltrates and there is no evidence of pneumothorax nor pleural effusion.    IMPRESSION:  Partial clearing, left lung.    < end of copied text >  < from: Xray Abdomen 1 View PORTABLE -Routine (Xray Abdomen 1 View PORTABLE -Routine in AM.) (05.31.21 @ 08:49) >  Impression: Status post tracheostomy. Status post left chest pigtail catheter. Trace left pneumothorax is unchanged. Cardiomegaly. Moderate to severe multifocal patchy opacificationof both lungs, left greater than right, is unchanged.    Nonobstructive bowel gas pattern. No dilated loops of small or large bowel.    < end of copied text >    Goals of Care Discussion with Family/Proxy/Other   - see note from 4/14

## 2021-06-05 NOTE — PROGRESS NOTE ADULT - ASSESSMENT
55M, no PMH, PSH appy and moody 1990s presented 3/14 with x9 days worsening cough, subjective fevers, and SOB, with x2-3 days dysuria and central, non-radiating, constant CP, admitted to [facility] for management of COVID PNA. Given discomfort of breathing, placed on 4L NC, with improvement. Labs notable for lymphopenia and neutrophilia despite WBC wnl, d-dimer 423, AST//114, lactate 3.3, and . Trop negative x1. +COVID. CXR notable for b/l infiltrates, L>R. Given x1 dose Tylenol and 1L IVF bolus in ED. Initially started on Remdesivir and Dexamethasone, Remdesivir was later discontinued secondary to positive antibodies and elevated LFT's. Not a  candidate for Tociluzimab due to elevated LFT. Patient continued to have respiratory distress requiring 15 L NRB support.    3/19/2021- Patient got transferred to ICU  due to increased work of breathing despite being on 15 L NRB . Patient was on HFNC to keep saturation >90%. Chest X ray showed Grossly stable mild left apical pneumothorax. Stable small pneumomediastinum. Soft tissue emphysema at the neck bases again noted. Stable patchy bilateral pulmonary infiltrates. Thoracic surgery consulted, no intervention at this time.  3/22/2021 - Chest X ray revealed trace ptx right and mild left pneumothorax  3/24/2021 - Overnight pt saturation remains in early 90s and late 80s . Pt remains on HFNC. CXR stable  3/25/2021- Chest X ray showed Overlying chin obscures lung apices. Questionable tiny left apical pneumothorax. Trial of semi- pulse steroid .. solumedrol 250 bid x 3 days   3/28- Started on solumedrol 40 mg bid  3/29/2021 - At 5.30 am, pt was in severe respiratory distress with tachypneic in 40s, tachycardic in 140s, saturating at high 70s, so decision was made to sedate and intubate the pt. Anesthesiologist intubated the patient. A central line, NGT  was placed  4/1/2021- Palliative care consulted. patient's brother/Idalgo ([phone]) has agreed to act as surrogate. Prednisone tapered to  once daily   4/6/2021- Left TLC placed, Hypertensive - s/p Lopressor 5 mg X 1  4/8/2021- Lasix daily and HCTz 50 mg added to aim for negative fluid balance, Left Chest tube placed, added Albumin (4/8-4/10)  4/11/2021-patient was having fever 100.8 rectal, s/p Tylenol , s/p 1 dose of vancomycin, on zosyn - New infiltrate on CXR ,likely developing VAP  4/13/2021- Continue to spike fevers, Zosyn switched to Meropenem. ID- Dr Anand following, new TLC placed  4/15/2021- Chest tuber discontinued   4/18/2021- BP running on lower side, Started on phenylephrine, Ibuprofen D/Connor , FiO2 increased to 70%Patient had large pneumothorax note don Left, thoracic surgery was consulted ,  left chest tube placed.  4/20/2021- Patient received 1 dose of Epifanio for vent asynchrony, A line placed, vent settings changed fro mPC to CVVC     4/21/2021- Trach placed  4/23/2021- PEG tube placed  4/25/2021 - cxr 4/25/21 with A 40% LEFT pneumothorax. LEFT multi-sidehole pigtail catheter overlies LEFT lower hemithorax.. Bilateral multifocal and diffuse ill-defined airspace opacities..  Follow-up AP portable chest radiograph 4/25/2021 AT 8:58 AM: Residual LEFT 30% upper zone pneumothorax.  4/26/2021- Patient noted to have hemoglobin drop to 6.8 from 7.5, will transfuse one unit of PRBC and monitor CBC.   4/27/2021- Patient's hemoglobin dropped again to 7, will give two unit of PRBC  4/28/2021- On 4/28 Hb 6.8, s/p 1 unit of PRBC hb 7.5 now. 4 PRBC total  CTH and CT abdomen w/o contrast no evidence of bleed  No active signs of bleeding (No hematemesis, melena or hematuria)  F/u hemolysis w/u- negative so far, elevated reticulocytes . Dr Andrade consulted  5/2/2021-Patients Hb remained stable, was tachypneic and breathing over the vent so 1 mg push of Dilaudid X 2 was given. V: 20/400/60%/3+ on precedex drip at 1.5.   5/3/2021- patient was found to have large gastric bubble, G tube was connected to wall suction, repeat CXR showed improvement.  5/5/2021 - Hemoglobin dropped from 8.8 to 7.4.Will add bactrim empirically as patient is on chronic prednisone for organizing pneumonia. Tube feeds were held because of diarrhea  5/6/2021- CT chest was done, which showed mild PTX. Left 3rd intercostal space Chest tube was placed. Patient was hypotensive overnight. Cristiano temperature. Was septic, will send cultures if spikes temperature again. Patient was retaining urine, andrea was placed overnight. Will start on albumin and give one dose of lasix as patient was hypotensive and edematous in upper extremities, also had low albumin. Patient was getting agitated, will give ketamine IV push and monitor if it helps.  5/10/2021- Pt tachycardic overnight and hypertensive to 228/116 w/p 1x lopressor. Stopped levophed. Pt became hypotensive. Then started phenylephrine. BP now stable. Will d/c methadone and seroquel and continue on precedex for sedation. CXR showing apical pneumothorax   5/13/2021- Pt tachycardic to 160-170 yesterday afternoon and night. Pupils dilated. Did not respond to increased doses of opiates and benzos. Developed fever to 102.3 overnight with episode of vomiting. UA was positive. started empirically on vanc/zosyn - will d/c for now as no clear source of infection. AM abdominal xray w/ likely ileus. Will restart Free water. Hold Lasix. CT abdomen done showing ileus and possible fecal matter in rectum. Fecal disimpaction performed, small ball of stool retrieved with liquid stool excreted subsequently. Spoke to Surgery RUBI Wood. Tube feeds stopped and PEG tube placed to suction with no output.. Also will restart Precedex as likely in withdrawal and clonid  5/18/2021- Patient was tachy and agitated. Later he had hypotension and bradycardia. He was given 1L bolus and was restarted on pheny after which he improved  5/25- Behavioral health consulted due to difficultyn weaning off sedation . Recommended C/w Klonopin to 1 mg q8h standing (no PRNs), with plan to further taper as tolerated. C/w Seroquel 50 mg BID standing  5/26/2021- BP was elevated, was started on labetalol. d/c andrea.  CT placed on water seal, c/w water seal  5/27/2021- Vomiting X2, held feeding, CT Abdomen/Pelvis ordered  5/28/2021- Placed on trach collar today  5/30/2021- Patient was on trach collar throughout the day yesterday and placed back on vent overnight. No other acute events overnight.    5/31/2021- Decreased Methadone to 5 mg daily and Klonopin to 0.5 mg every 8 hours. Plan was made to transfer patient to SCU  6/1 Abd discomfort. AXR results as above, no bowel obstruction. Resume PEG feedings.   6/1 TC trial. Pt tolerated only 10 minutes, RR ~30's and anxious.  Will attempt SBT with PS.   6/3  Only tolerated 10 minutes of BSBT with PS 5. Became diaphoretic and c/o SOB   6/5  Tolerated 9 hours SBT yesterday and 2.5 hours today with PS 15

## 2021-06-05 NOTE — PROGRESS NOTE ADULT - SUBJECTIVE AND OBJECTIVE BOX
Patient is a 55y old  Male who presents with a chief complaint of SOB (05 Jun 2021 06:15)  Awake, alert, laying in bed in NAD. Still on ventilator via trach. Stable on 40% FIO2    INTERVAL HPI/OVERNIGHT EVENTS:      VITAL SIGNS:  T(F): 98.1 (06-05-21 @ 05:15)  HR: 77 (06-05-21 @ 05:15)  BP: 130/80 (06-05-21 @ 05:15)  RR: 18 (06-05-21 @ 05:15)  SpO2: 100% (06-05-21 @ 05:15)  Wt(kg): --  I&O's Detail    Mode: PS (Pressure Support)/ Spontaneous  FiO2: 40  PEEP: 5  PS: 15  ITime: 1  PIP: 16        REVIEW OF SYSTEMS:    CONSTITUTIONAL:  No fevers, chills, sweats    HEENT:  Eyes:  No diplopia or blurred vision. ENT:  No earache, sore throat or runny nose.    CARDIOVASCULAR:  No pressure, squeezing, tightness, or heaviness about the chest; no palpitations.    RESPIRATORY:  Per HPI    GASTROINTESTINAL:  No abdominal pain, nausea, vomiting or diarrhea.    GENITOURINARY:  No dysuria, frequency or urgency.    NEUROLOGIC:  No paresthesias, fasciculations, seizures or weakness.    PSYCHIATRIC:  No disorder of thought or mood.      PHYSICAL EXAM:    Constitutional: Well developed and nourished  Eyes:Perrla  ENMT: normal  Neck:supple  Respiratory: good air entry  Cardiovascular: S1 S2 regular  Gastrointestinal: Soft, Non tender  Extremities: No edema  Vascular:normal  Neurological:Awake, alert  Musculoskeletal:Normal      MEDICATIONS  (STANDING):  ascorbic acid 1000 milliGRAM(s) Oral daily  BACItracin   Ointment 1 Application(s) Topical two times a day  calcium carbonate 1250 mG  + Vitamin D (OsCal 500 + D) 1 Tablet(s) Oral daily  chlorhexidine 2% Cloths 1 Application(s) Topical <User Schedule>  cholecalciferol 1000 Unit(s) Oral daily  clonazePAM  Tablet 0.5 milliGRAM(s) Oral every 12 hours  enoxaparin Injectable 40 milliGRAM(s) SubCutaneous every 24 hours  gabapentin 100 milliGRAM(s) Oral every 8 hours  labetalol 100 milliGRAM(s) Enteral Tube two times a day  methadone    Tablet 5 milliGRAM(s) Oral daily  naloxegol 12.5 milliGRAM(s) Oral daily  pantoprazole  Injectable 40 milliGRAM(s) IV Push daily  predniSONE   Tablet 15 milliGRAM(s) Oral daily  QUEtiapine 50 milliGRAM(s) Oral two times a day  senna 2 Tablet(s) Oral at bedtime  trimethoprim  40 mG/sulfamethoxazole 200 mG Suspension 160 milliGRAM(s) Oral <User Schedule>    MEDICATIONS  (PRN):  acetaminophen    Suspension .. 650 milliGRAM(s) Enteral Tube every 12 hours PRN Temp greater or equal to 38C (100.4F), Mild Pain (1 - 3)  ALBUTerol    90 MICROgram(s) HFA Inhaler 2 Puff(s) Inhalation every 6 hours PRN Shortness of Breath and/or Wheezing  artificial  tears Solution 1 Drop(s) Both EYES every 4 hours PRN Dry Eyes  bisacodyl Suppository 10 milliGRAM(s) Rectal daily PRN Constipation  ondansetron Injectable 4 milliGRAM(s) IV Push every 6 hours PRN Nausea and/or Vomiting  polyethylene glycol 3350 17 Gram(s) Oral daily PRN Constipation  sodium chloride 0.9% lock flush 10 milliLiter(s) IV Push every 1 hour PRN Pre/post blood products, medications, blood draw, and to maintain line patency      Allergies    No Known Allergies    Intolerances        LABS:                        10.4   8.85  )-----------( 225      ( 05 Jun 2021 09:56 )             31.1     06-05    136  |  98  |  23<H>  ----------------------------<  129<H>  4.3   |  30  |  0.87    Ca    8.6      05 Jun 2021 09:56  Phos  1.9     06-05  Mg     1.8     06-05    TPro  6.8  /  Alb  3.0<L>  /  TBili  0.5  /  DBili  x   /  AST  23  /  ALT  46  /  AlkPhos  91  06-05              CAPILLARY BLOOD GLUCOSE      POCT Blood Glucose.: 120 mg/dL (05 Jun 2021 07:01)  POCT Blood Glucose.: 119 mg/dL (05 Jun 2021 00:22)  POCT Blood Glucose.: 127 mg/dL (04 Jun 2021 18:43)  POCT Blood Glucose.: 154 mg/dL (04 Jun 2021 11:47)        RADIOLOGY & ADDITIONAL TESTS:    CXR:  < from: Xray Chest 1 View- PORTABLE-Routine (Xray Chest 1 View- PORTABLE-Routine in AM.) (06.04.21 @ 09:37) >  IMPRESSION:  Partial clearing, left lung.    < end of copied text >    Ct scan chest:    ekg;    echo:

## 2021-06-05 NOTE — PROGRESS NOTE ADULT - PROBLEM SELECTOR PLAN 1
S/p Dexamethasone , No Remdesivir  due to positive antibodies   CXR results as above  Continue Bactrim for empiric coverage   Continue with prednisone taper  Continue inhalers  Continue ventilatory support.   Will continue SBT with PS during the day as tolerated. Tolerated 2.5 hours today.  Full vent support overnight  Monitor oxygenation saturation.

## 2021-06-05 NOTE — PROGRESS NOTE ADULT - PROBLEM SELECTOR PLAN 7
Delirium due to COVID  infection, PNA,  metabolic derangement, benzo withdrawal, prolonged hospital stay  Improving  Continue Klonopin 0.5mg  ---->decreased to q12h.   Continue Seroquel 50mg BID,   Methadone  Supportive measures  Psych following.

## 2021-06-05 NOTE — PROGRESS NOTE ADULT - SUBJECTIVE AND OBJECTIVE BOX
Patient is a 55y old  Male who presents with a chief complaint of SOB (04 Jun 2021 11:42)    pt seen in icu [  ], reg med floor scu [ x  ], bed [ x ], chair at bedside [   ], awake and responsive [ x ], mildly sedated, [  ],    nad [x  ]        Allergies    No Known Allergies        Vitals    T(F): 98.1 (06-05-21 @ 05:15), Max: 98.1 (06-04-21 @ 13:43)  HR: 77 (06-05-21 @ 05:15) (77 - 85)  BP: 130/80 (06-05-21 @ 05:15) (127/69 - 131/80)  RR: 18 (06-05-21 @ 05:15) (18 - 20)  SpO2: 100% (06-05-21 @ 05:15) (100% - 100%)  Wt(kg): --  CAPILLARY BLOOD GLUCOSE      POCT Blood Glucose.: 119 mg/dL (05 Jun 2021 00:22)      Labs                          9.9    7.43  )-----------( 228      ( 04 Jun 2021 07:11 )             27.9       06-04    136  |  97  |  19<H>  ----------------------------<  105<H>  3.2<L>   |  34<H>  |  0.80    Ca    8.9      04 Jun 2021 07:11  Phos  2.6     06-04  Mg     2.0     06-04    TPro  6.5  /  Alb  3.1<L>  /  TBili  0.5  /  DBili  x   /  AST  25  /  ALT  41  /  AlkPhos  88  06-04            .Sputum Sputum  04-16 @ 04:42   Moderate Enterobacter aerogenes (Carbapenem Resistant)  Normal Respiratory Monserrat present  --  Enterobacter aerogenes (Carbapenem Resistant)      .Urine Clean Catch (Midstream)  03-15 @ 00:52   No growth  --  --          Radiology Results      Meds    MEDICATIONS  (STANDING):  ascorbic acid 1000 milliGRAM(s) Oral daily  BACItracin   Ointment 1 Application(s) Topical two times a day  calcium carbonate 1250 mG  + Vitamin D (OsCal 500 + D) 1 Tablet(s) Oral daily  chlorhexidine 2% Cloths 1 Application(s) Topical <User Schedule>  cholecalciferol 1000 Unit(s) Oral daily  clonazePAM  Tablet 0.5 milliGRAM(s) Oral every 12 hours  enoxaparin Injectable 40 milliGRAM(s) SubCutaneous every 24 hours  gabapentin 100 milliGRAM(s) Oral every 8 hours  labetalol 100 milliGRAM(s) Enteral Tube two times a day  methadone    Tablet 5 milliGRAM(s) Oral daily  naloxegol 12.5 milliGRAM(s) Oral daily  pantoprazole  Injectable 40 milliGRAM(s) IV Push daily  predniSONE   Tablet 15 milliGRAM(s) Oral daily  QUEtiapine 50 milliGRAM(s) Oral two times a day  senna 2 Tablet(s) Oral at bedtime  trimethoprim  40 mG/sulfamethoxazole 200 mG Suspension 160 milliGRAM(s) Oral <User Schedule>      MEDICATIONS  (PRN):  acetaminophen    Suspension .. 650 milliGRAM(s) Enteral Tube every 12 hours PRN Temp greater or equal to 38C (100.4F), Mild Pain (1 - 3)  ALBUTerol    90 MICROgram(s) HFA Inhaler 2 Puff(s) Inhalation every 6 hours PRN Shortness of Breath and/or Wheezing  artificial  tears Solution 1 Drop(s) Both EYES every 4 hours PRN Dry Eyes  bisacodyl Suppository 10 milliGRAM(s) Rectal daily PRN Constipation  ondansetron Injectable 4 milliGRAM(s) IV Push every 6 hours PRN Nausea and/or Vomiting  polyethylene glycol 3350 17 Gram(s) Oral daily PRN Constipation  sodium chloride 0.9% lock flush 10 milliLiter(s) IV Push every 1 hour PRN Pre/post blood products, medications, blood draw, and to maintain line patency      Physical Exam    Neuro :  no focal deficits  Respiratory: CTA B/L  CV: RRR, S1S2, no murmurs,   Abdominal: Soft, NT, ND +BS, gastrostomy tube inplace  Extremities: edema of extrem, + peripheral pulses      ASSESSMENT      Hypoxemia 2nd to covid pna   transaminitis  prediabetes  h/o appendectomy  cholecystectomy        PLAN    cont cont precautions  covid 5/14/21 neg noted above  d/c remdesevir given covid ab positive noted   completed dexamethasone   started pulse steroids for 3 days - 250mg solumedrol bid now tapered off 4/14/21   prednisone 20 daily d/c 6/1/21   cont on bactrim empirically, TIW   cont asa, vit c,    cont albuterol inhaler   pulm f/u  procalcitonin, D-dimer, crp, ldh, ferritin, lactate noted ,    cont tylenol prn,   cont robitussin prn   pt off precedex    pt on methadone   pain mgmt eval noted   off phenylephrine drip for hypotension  clonidine held 2nd to low bp  s/p intubation 3/29/21   s/p tracheostomy 4/21/21  O2 sat  97% (97% - 100%) mv 40%  O2 via mech vent   cont wean as tolerated   vent mgmt as per scu  thoracic surg f/u   s/p L chest tube replacement 4/18/21 for recurrence of left pneumothorax   cxr 4/20/21 with Unchanged advanced infiltrates and catheter left chest tube noted   CXR 4/11/21 with : No interval change compared to one day prior. No pneumothorax noted.   unable to flush or aspirate tube fully, noted debris in tubing which was milked out.   Once material removed from tubing able to aspirated and flush fully. Also changed dressing on pigtail which appears to be in good position.   Monitor O2 status   s/p tracheostomy 4/21/21   cxr 4/21/21 with No evidence of active chest disease. Tracheostomy tube in place otherwise no significant change noted   cxr 4/25/21 with A 40% LEFT pneumothorax. LEFT multi-sidehole pigtail catheter overlies LEFT lower hemithorax.. Bilateral multifocal and diffuse ill-defined airspace opacities..  Follow-up AP portable chest radiograph 4/25/2021 AT 8:58 AM: Residual LEFT 30% upper zone pneumothorax. Otherwise no interval change.noted    s/p 2nd chest tube 5/5/21  cxr 5/6/21 with Tracheostomy and catheter left chest tubes remain. , There are significant diffuse advanced infiltrates again noted. No pneumothorax. Above findings are similar to study earlier in the day. Present film shows a left jugular line inserted   with tip entering the superior vena cava noted   cxr 5/30/21 with Tracheostomy cannula left chest catheter reidentified in position. No change small simple left apical pneumothorax. No change bilateral airspace opacities. Trace left pleural effusion suggested noted   cxr 5/31/21 with Status post tracheostomy. Status post left chest pigtail catheter. Trace left pneumothorax is unchanged. Cardiomegaly. Moderate to severe multifocal patchy opacificationof both lungs, left greater than right, is unchanged. Nonobstructive bowel gas pattern. No dilated loops of small or large bowel noted   s/p removal of L pigtail.   CXR 6/1/21 post l pig tail removal with Left pigtail catheter seen earlier in the day has been removed. Persistent amorphous infiltrate at the site is seen. No pneumothorax noted above.  No acute thoracic surgical intervention at this time,  s/p surgical placement of gastrostomy tube 4/23/2021.   abd xray 5/10/21 with ileus noted   dressing changed daily for seroma   abd xray 5/21/21 with Decreased bowel ileus compared to prior 5/20/2021 exam noted above.   abd xray 5/22/21 with No dilated loops of bowel noted above.   cont tube feeding   CT scan of the chest 5/13/21 demonstrates diffuse bilateral infiltrates with a region of consolidation at the left lung base. Small left pneumothorax. 2 left chest tubes noted in place noted above.  CT scan of the abdomen and pelvis 5/13/21 demonstrates diffuse dilatation of small and large bowel loops most consistent with ileus noted   xray abd with  5/14/21 with Ingested contrast within nonobstructed large bowel to the level of rectum. No acute radiographic intra-abdominal findings noted   ct abd-pelv 5/27/21 with Delayed nephrographic phase enhancement of the nonobstructed bilateral kidneys, suggestive of acute renal insufficiency. No evidence of bowel obstruction. Proctitis noted above.   id f/u   patient completed course of Meropenem  leukocytosis resolved  sputum cx with Enterobacter aerogenes (Carbapenem Resistant) noted above   afebrile  Completed  meropenem, s/p 1 dose of Vancomycin   completed zosyn  lispro ss   s/p 4 units prbc for anemia  f/u h/h    transfuse prbc as needed  heme onc f/u  Haptoglobin normal  no hemolysis, Fe/B12/folate adequate  hemolysis is unlikely even his baseline haptoglobin may be very elevated due to COVID  direct pamella neg noted    psych f/u/  C/w Klonopin to 1 mg q8h standing (no PRNs), with plan to further taper as tolerated  cont Seroquel to 50 mg BID standing starting tonight  Continue delirium precautions: Frequent reorientation, familiar pictures and objects at bedside, natural light in daytime,   consistent sleep/wake schedule, adequate hydration and nutrition, sensory aids (hearing aids, glasses) present if needed, minimizing noise and overstimulation,   clustering care to minimize overnight interruptions, judicious use of deliriogenic medications (anticholinergics, benzodiazepines and opioid analgesics), minimize use of restraints  phys tx eval  cont current meds   mgmt as per scu     Patient is a 55y old  Male who presents with a chief complaint of SOB (04 Jun 2021 11:42)    pt seen in icu [  ], reg med floor scu [ x  ], bed [ x ], chair at bedside [   ], awake and responsive [ x ], mildly sedated, [  ],    nad [x  ]        Allergies    No Known Allergies        Vitals    T(F): 98.1 (06-05-21 @ 05:15), Max: 98.1 (06-04-21 @ 13:43)  HR: 77 (06-05-21 @ 05:15) (77 - 85)  BP: 130/80 (06-05-21 @ 05:15) (127/69 - 131/80)  RR: 18 (06-05-21 @ 05:15) (18 - 20)  SpO2: 100% (06-05-21 @ 05:15) (100% - 100%)  Wt(kg): --  CAPILLARY BLOOD GLUCOSE      POCT Blood Glucose.: 119 mg/dL (05 Jun 2021 00:22)      Labs                          9.9    7.43  )-----------( 228      ( 04 Jun 2021 07:11 )             27.9       06-04    136  |  97  |  19<H>  ----------------------------<  105<H>  3.2<L>   |  34<H>  |  0.80    Ca    8.9      04 Jun 2021 07:11  Phos  2.6     06-04  Mg     2.0     06-04    TPro  6.5  /  Alb  3.1<L>  /  TBili  0.5  /  DBili  x   /  AST  25  /  ALT  41  /  AlkPhos  88  06-04            .Sputum Sputum  04-16 @ 04:42   Moderate Enterobacter aerogenes (Carbapenem Resistant)  Normal Respiratory Monserrat present  --  Enterobacter aerogenes (Carbapenem Resistant)      .Urine Clean Catch (Midstream)  03-15 @ 00:52   No growth  --  --          Radiology Results      Meds    MEDICATIONS  (STANDING):  ascorbic acid 1000 milliGRAM(s) Oral daily  BACItracin   Ointment 1 Application(s) Topical two times a day  calcium carbonate 1250 mG  + Vitamin D (OsCal 500 + D) 1 Tablet(s) Oral daily  chlorhexidine 2% Cloths 1 Application(s) Topical <User Schedule>  cholecalciferol 1000 Unit(s) Oral daily  clonazePAM  Tablet 0.5 milliGRAM(s) Oral every 12 hours  enoxaparin Injectable 40 milliGRAM(s) SubCutaneous every 24 hours  gabapentin 100 milliGRAM(s) Oral every 8 hours  labetalol 100 milliGRAM(s) Enteral Tube two times a day  methadone    Tablet 5 milliGRAM(s) Oral daily  naloxegol 12.5 milliGRAM(s) Oral daily  pantoprazole  Injectable 40 milliGRAM(s) IV Push daily  predniSONE   Tablet 15 milliGRAM(s) Oral daily  QUEtiapine 50 milliGRAM(s) Oral two times a day  senna 2 Tablet(s) Oral at bedtime  trimethoprim  40 mG/sulfamethoxazole 200 mG Suspension 160 milliGRAM(s) Oral <User Schedule>      MEDICATIONS  (PRN):  acetaminophen    Suspension .. 650 milliGRAM(s) Enteral Tube every 12 hours PRN Temp greater or equal to 38C (100.4F), Mild Pain (1 - 3)  ALBUTerol    90 MICROgram(s) HFA Inhaler 2 Puff(s) Inhalation every 6 hours PRN Shortness of Breath and/or Wheezing  artificial  tears Solution 1 Drop(s) Both EYES every 4 hours PRN Dry Eyes  bisacodyl Suppository 10 milliGRAM(s) Rectal daily PRN Constipation  ondansetron Injectable 4 milliGRAM(s) IV Push every 6 hours PRN Nausea and/or Vomiting  polyethylene glycol 3350 17 Gram(s) Oral daily PRN Constipation  sodium chloride 0.9% lock flush 10 milliLiter(s) IV Push every 1 hour PRN Pre/post blood products, medications, blood draw, and to maintain line patency      Physical Exam    Neuro :  no focal deficits  Respiratory: CTA B/L  CV: RRR, S1S2, no murmurs,   Abdominal: Soft, NT, ND +BS, gastrostomy tube inplace  Extremities: edema of extrem, + peripheral pulses      ASSESSMENT      Hypoxemia 2nd to covid pna   transaminitis  prediabetes  h/o appendectomy  cholecystectomy        PLAN    cont cont precautions  covid 5/14/21 neg noted above  d/c remdesevir given covid ab positive noted   completed dexamethasone   started pulse steroids for 3 days - 250mg solumedrol bid now tapered off 4/14/21   prednisone 20 daily d/c 6/1/21   cont on bactrim empirically, TIW   cont asa, vit c,    cont albuterol inhaler   pulm f/u  procalcitonin, D-dimer, crp, ldh, ferritin, lactate noted ,    cont tylenol prn,   cont robitussin prn   pt off precedex    pt on methadone   pain mgmt eval noted   off phenylephrine drip for hypotension  clonidine held 2nd to low bp  s/p intubation 3/29/21   s/p tracheostomy 4/21/21  O2 sat  100% (100% - 100%) mv 40%  O2 via mech vent   cont wean as tolerated   pt tolerated 9hrs of only pressure support 6/4/21  vent mgmt as per scu  thoracic surg f/u   s/p L chest tube replacement 4/18/21 for recurrence of left pneumothorax   cxr 4/20/21 with Unchanged advanced infiltrates and catheter left chest tube noted   CXR 4/11/21 with : No interval change compared to one day prior. No pneumothorax noted.   unable to flush or aspirate tube fully, noted debris in tubing which was milked out.   Once material removed from tubing able to aspirated and flush fully. Also changed dressing on pigtail which appears to be in good position.   Monitor O2 status   s/p tracheostomy 4/21/21   cxr 4/21/21 with No evidence of active chest disease. Tracheostomy tube in place otherwise no significant change noted   cxr 4/25/21 with A 40% LEFT pneumothorax. LEFT multi-sidehole pigtail catheter overlies LEFT lower hemithorax.. Bilateral multifocal and diffuse ill-defined airspace opacities..  Follow-up AP portable chest radiograph 4/25/2021 AT 8:58 AM: Residual LEFT 30% upper zone pneumothorax. Otherwise no interval change.noted    s/p 2nd chest tube 5/5/21  cxr 5/6/21 with Tracheostomy and catheter left chest tubes remain. , There are significant diffuse advanced infiltrates again noted. No pneumothorax. Above findings are similar to study earlier in the day. Present film shows a left jugular line inserted   with tip entering the superior vena cava noted   cxr 5/30/21 with Tracheostomy cannula left chest catheter reidentified in position. No change small simple left apical pneumothorax. No change bilateral airspace opacities. Trace left pleural effusion suggested noted   cxr 5/31/21 with Status post tracheostomy. Status post left chest pigtail catheter. Trace left pneumothorax is unchanged. Cardiomegaly. Moderate to severe multifocal patchy opacificationof both lungs, left greater than right, is unchanged. Nonobstructive bowel gas pattern. No dilated loops of small or large bowel noted   s/p removal of L pigtail.   CXR 6/1/21 post l pig tail removal with Left pigtail catheter seen earlier in the day has been removed. Persistent amorphous infiltrate at the site is seen. No pneumothorax noted above.  No acute thoracic surgical intervention at this time,  s/p surgical placement of gastrostomy tube 4/23/2021.   abd xray 5/10/21 with ileus noted   dressing changed daily for seroma   abd xray 5/21/21 with Decreased bowel ileus compared to prior 5/20/2021 exam noted above.   abd xray 5/22/21 with No dilated loops of bowel noted above.   cont tube feeding   CT scan of the chest 5/13/21 demonstrates diffuse bilateral infiltrates with a region of consolidation at the left lung base. Small left pneumothorax. 2 left chest tubes noted in place noted above.  CT scan of the abdomen and pelvis 5/13/21 demonstrates diffuse dilatation of small and large bowel loops most consistent with ileus noted   xray abd with  5/14/21 with Ingested contrast within nonobstructed large bowel to the level of rectum. No acute radiographic intra-abdominal findings noted   ct abd-pelv 5/27/21 with Delayed nephrographic phase enhancement of the nonobstructed bilateral kidneys, suggestive of acute renal insufficiency. No evidence of bowel obstruction. Proctitis noted above.   id f/u   patient completed course of Meropenem  leukocytosis resolved  sputum cx with Enterobacter aerogenes (Carbapenem Resistant) noted above   afebrile  Completed  meropenem, s/p 1 dose of Vancomycin   completed zosyn  lispro ss   s/p 4 units prbc for anemia  f/u h/h    transfuse prbc as needed  heme onc f/u  Haptoglobin normal  no hemolysis, Fe/B12/folate adequate  hemolysis is unlikely even his baseline haptoglobin may be very elevated due to COVID  direct pamella neg noted    psych f/u/  C/w Klonopin to 1 mg q8h standing (no PRNs), with plan to further taper as tolerated  cont Seroquel to 50 mg BID standing starting tonight  Continue delirium precautions: Frequent reorientation, familiar pictures and objects at bedside, natural light in daytime,   consistent sleep/wake schedule, adequate hydration and nutrition, sensory aids (hearing aids, glasses) present if needed, minimizing noise and overstimulation,   clustering care to minimize overnight interruptions, judicious use of deliriogenic medications (anticholinergics, benzodiazepines and opioid analgesics), minimize use of restraints  phys tx eval  cont current meds   mgmt as per scu

## 2021-06-06 LAB
ALBUMIN SERPL ELPH-MCNC: 3.3 G/DL — LOW (ref 3.5–5)
ALP SERPL-CCNC: 85 U/L — SIGNIFICANT CHANGE UP (ref 40–120)
ALT FLD-CCNC: 59 U/L DA — SIGNIFICANT CHANGE UP (ref 10–60)
ANION GAP SERPL CALC-SCNC: 6 MMOL/L — SIGNIFICANT CHANGE UP (ref 5–17)
AST SERPL-CCNC: 32 U/L — SIGNIFICANT CHANGE UP (ref 10–40)
BILIRUB SERPL-MCNC: 0.5 MG/DL — SIGNIFICANT CHANGE UP (ref 0.2–1.2)
BUN SERPL-MCNC: 23 MG/DL — HIGH (ref 7–18)
CALCIUM SERPL-MCNC: 9.1 MG/DL — SIGNIFICANT CHANGE UP (ref 8.4–10.5)
CHLORIDE SERPL-SCNC: 100 MMOL/L — SIGNIFICANT CHANGE UP (ref 96–108)
CO2 SERPL-SCNC: 30 MMOL/L — SIGNIFICANT CHANGE UP (ref 22–31)
CREAT SERPL-MCNC: 0.83 MG/DL — SIGNIFICANT CHANGE UP (ref 0.5–1.3)
GLUCOSE SERPL-MCNC: 96 MG/DL — SIGNIFICANT CHANGE UP (ref 70–99)
HCT VFR BLD CALC: 31 % — LOW (ref 39–50)
HGB BLD-MCNC: 10.1 G/DL — LOW (ref 13–17)
MAGNESIUM SERPL-MCNC: 2.1 MG/DL — SIGNIFICANT CHANGE UP (ref 1.6–2.6)
MCHC RBC-ENTMCNC: 32.4 PG — SIGNIFICANT CHANGE UP (ref 27–34)
MCHC RBC-ENTMCNC: 32.6 GM/DL — SIGNIFICANT CHANGE UP (ref 32–36)
MCV RBC AUTO: 99.4 FL — SIGNIFICANT CHANGE UP (ref 80–100)
NRBC # BLD: 0 /100 WBCS — SIGNIFICANT CHANGE UP (ref 0–0)
PHOSPHATE SERPL-MCNC: 3.3 MG/DL — SIGNIFICANT CHANGE UP (ref 2.5–4.5)
PLATELET # BLD AUTO: 254 K/UL — SIGNIFICANT CHANGE UP (ref 150–400)
POTASSIUM SERPL-MCNC: 3.8 MMOL/L — SIGNIFICANT CHANGE UP (ref 3.5–5.3)
POTASSIUM SERPL-SCNC: 3.8 MMOL/L — SIGNIFICANT CHANGE UP (ref 3.5–5.3)
PROT SERPL-MCNC: 6.9 G/DL — SIGNIFICANT CHANGE UP (ref 6–8.3)
RBC # BLD: 3.12 M/UL — LOW (ref 4.2–5.8)
RBC # FLD: 15.2 % — HIGH (ref 10.3–14.5)
SARS-COV-2 RNA SPEC QL NAA+PROBE: SIGNIFICANT CHANGE UP
SODIUM SERPL-SCNC: 136 MMOL/L — SIGNIFICANT CHANGE UP (ref 135–145)
WBC # BLD: 8.73 K/UL — SIGNIFICANT CHANGE UP (ref 3.8–10.5)
WBC # FLD AUTO: 8.73 K/UL — SIGNIFICANT CHANGE UP (ref 3.8–10.5)

## 2021-06-06 RX ADMIN — Medication 1000 MILLIGRAM(S): at 12:02

## 2021-06-06 RX ADMIN — Medication 15 MILLIGRAM(S): at 07:52

## 2021-06-06 RX ADMIN — GABAPENTIN 100 MILLIGRAM(S): 400 CAPSULE ORAL at 07:56

## 2021-06-06 RX ADMIN — QUETIAPINE FUMARATE 50 MILLIGRAM(S): 200 TABLET, FILM COATED ORAL at 23:54

## 2021-06-06 RX ADMIN — GABAPENTIN 100 MILLIGRAM(S): 400 CAPSULE ORAL at 23:53

## 2021-06-06 RX ADMIN — Medication 100 MILLIGRAM(S): at 17:50

## 2021-06-06 RX ADMIN — METHADONE HYDROCHLORIDE 5 MILLIGRAM(S): 40 TABLET ORAL at 12:06

## 2021-06-06 RX ADMIN — SENNA PLUS 2 TABLET(S): 8.6 TABLET ORAL at 23:55

## 2021-06-06 RX ADMIN — Medication 100 MILLIGRAM(S): at 07:56

## 2021-06-06 RX ADMIN — Medication 0.5 MILLIGRAM(S): at 07:56

## 2021-06-06 RX ADMIN — QUETIAPINE FUMARATE 50 MILLIGRAM(S): 200 TABLET, FILM COATED ORAL at 12:02

## 2021-06-06 RX ADMIN — Medication 1 TABLET(S): at 12:03

## 2021-06-06 RX ADMIN — PANTOPRAZOLE SODIUM 40 MILLIGRAM(S): 20 TABLET, DELAYED RELEASE ORAL at 12:03

## 2021-06-06 RX ADMIN — GABAPENTIN 100 MILLIGRAM(S): 400 CAPSULE ORAL at 13:17

## 2021-06-06 RX ADMIN — Medication 1000 UNIT(S): at 12:02

## 2021-06-06 RX ADMIN — Medication 1 APPLICATION(S): at 17:50

## 2021-06-06 RX ADMIN — NALOXEGOL OXALATE 12.5 MILLIGRAM(S): 12.5 TABLET, FILM COATED ORAL at 12:02

## 2021-06-06 RX ADMIN — ENOXAPARIN SODIUM 40 MILLIGRAM(S): 100 INJECTION SUBCUTANEOUS at 12:01

## 2021-06-06 RX ADMIN — Medication 0.5 MILLIGRAM(S): at 17:51

## 2021-06-06 RX ADMIN — Medication 1 APPLICATION(S): at 07:52

## 2021-06-06 RX ADMIN — CHLORHEXIDINE GLUCONATE 1 APPLICATION(S): 213 SOLUTION TOPICAL at 07:51

## 2021-06-06 NOTE — PROGRESS NOTE ADULT - PROBLEM SELECTOR PLAN 2
Slowly improving.  CXR improved   Continue mechanical ventilation SBT as tolerated.  Serial CXRs  Monitor oxygen saturation.

## 2021-06-06 NOTE — PROGRESS NOTE ADULT - SUBJECTIVE AND OBJECTIVE BOX
Patient is a 55y old  Male who presents with a chief complaint of SOB (05 Jun 2021 12:10)    pt seen in icu [  ], reg med floor scu [ x  ], bed [ x ], chair at bedside [   ], awake and responsive [ x ], mildly sedated, [  ],    nad [x  ]      Allergies    No Known Allergies        Vitals    T(F): 98.4 (06-05-21 @ 20:43), Max: 98.4 (06-05-21 @ 20:43)  HR: 84 (06-05-21 @ 20:43) (79 - 84)  BP: 127/80 (06-05-21 @ 20:43) (118/73 - 127/80)  RR: 19 (06-05-21 @ 20:43) (19 - 30)  SpO2: 100% (06-05-21 @ 20:43) (100% - 100%)  Wt(kg): --  CAPILLARY BLOOD GLUCOSE      POCT Blood Glucose.: 104 mg/dL (05 Jun 2021 21:24)      Labs                          10.4   8.85  )-----------( 225      ( 05 Jun 2021 09:56 )             31.1       06-05    136  |  98  |  23<H>  ----------------------------<  129<H>  4.3   |  30  |  0.87    Ca    8.6      05 Jun 2021 09:56  Phos  1.9     06-05  Mg     1.8     06-05    TPro  6.8  /  Alb  3.0<L>  /  TBili  0.5  /  DBili  x   /  AST  23  /  ALT  46  /  AlkPhos  91  06-05            .Sputum Sputum  04-16 @ 04:42   Moderate Enterobacter aerogenes (Carbapenem Resistant)  Normal Respiratory Monserrat present  --  Enterobacter aerogenes (Carbapenem Resistant)      .Urine Clean Catch (Midstream)  03-15 @ 00:52   No growth  --  --          Radiology Results      Meds    MEDICATIONS  (STANDING):  ascorbic acid 1000 milliGRAM(s) Oral daily  BACItracin   Ointment 1 Application(s) Topical two times a day  calcium carbonate 1250 mG  + Vitamin D (OsCal 500 + D) 1 Tablet(s) Oral daily  chlorhexidine 2% Cloths 1 Application(s) Topical <User Schedule>  cholecalciferol 1000 Unit(s) Oral daily  clonazePAM  Tablet 0.5 milliGRAM(s) Oral every 12 hours  enoxaparin Injectable 40 milliGRAM(s) SubCutaneous every 24 hours  gabapentin 100 milliGRAM(s) Oral every 8 hours  labetalol 100 milliGRAM(s) Enteral Tube two times a day  methadone    Tablet 5 milliGRAM(s) Oral daily  naloxegol 12.5 milliGRAM(s) Oral daily  pantoprazole  Injectable 40 milliGRAM(s) IV Push daily  predniSONE   Tablet 15 milliGRAM(s) Oral daily  QUEtiapine 50 milliGRAM(s) Oral two times a day  senna 2 Tablet(s) Oral at bedtime  trimethoprim  40 mG/sulfamethoxazole 200 mG Suspension 160 milliGRAM(s) Oral <User Schedule>      MEDICATIONS  (PRN):  acetaminophen    Suspension .. 650 milliGRAM(s) Enteral Tube every 12 hours PRN Temp greater or equal to 38C (100.4F), Mild Pain (1 - 3)  ALBUTerol    90 MICROgram(s) HFA Inhaler 2 Puff(s) Inhalation every 6 hours PRN Shortness of Breath and/or Wheezing  artificial  tears Solution 1 Drop(s) Both EYES every 4 hours PRN Dry Eyes  bisacodyl Suppository 10 milliGRAM(s) Rectal daily PRN Constipation  ondansetron Injectable 4 milliGRAM(s) IV Push every 6 hours PRN Nausea and/or Vomiting  polyethylene glycol 3350 17 Gram(s) Oral daily PRN Constipation  sodium chloride 0.9% lock flush 10 milliLiter(s) IV Push every 1 hour PRN Pre/post blood products, medications, blood draw, and to maintain line patency      Physical Exam    Neuro :  no focal deficits  Respiratory: CTA B/L  CV: RRR, S1S2, no murmurs,   Abdominal: Soft, NT, ND +BS, gastrostomy tube inplace  Extremities: edema of extrem, + peripheral pulses      ASSESSMENT      Hypoxemia 2nd to covid pna   transaminitis  prediabetes  h/o appendectomy  cholecystectomy        PLAN    cont cont precautions  covid 5/14/21 neg noted above  d/c remdesevir given covid ab positive noted   completed dexamethasone   started pulse steroids for 3 days - 250mg solumedrol bid now tapered off 4/14/21   prednisone 20 daily d/c 6/1/21   cont on bactrim empirically, TIW   cont asa, vit c,    cont albuterol inhaler   pulm f/u  procalcitonin, D-dimer, crp, ldh, ferritin, lactate noted ,    cont tylenol prn,   cont robitussin prn   pt off precedex    pt on methadone   pain mgmt eval noted   off phenylephrine drip for hypotension  clonidine held 2nd to low bp  s/p intubation 3/29/21   s/p tracheostomy 4/21/21  O2 sat  100% (100% - 100%) mv 40%  O2 via mech vent   cont wean as tolerated   pt tolerated 9hrs of only pressure support 6/4/21  vent mgmt as per scu  thoracic surg f/u   s/p L chest tube replacement 4/18/21 for recurrence of left pneumothorax   cxr 4/20/21 with Unchanged advanced infiltrates and catheter left chest tube noted   CXR 4/11/21 with : No interval change compared to one day prior. No pneumothorax noted.   unable to flush or aspirate tube fully, noted debris in tubing which was milked out.   Once material removed from tubing able to aspirated and flush fully. Also changed dressing on pigtail which appears to be in good position.   Monitor O2 status   s/p tracheostomy 4/21/21   cxr 4/21/21 with No evidence of active chest disease. Tracheostomy tube in place otherwise no significant change noted   cxr 4/25/21 with A 40% LEFT pneumothorax. LEFT multi-sidehole pigtail catheter overlies LEFT lower hemithorax.. Bilateral multifocal and diffuse ill-defined airspace opacities..  Follow-up AP portable chest radiograph 4/25/2021 AT 8:58 AM: Residual LEFT 30% upper zone pneumothorax. Otherwise no interval change.noted    s/p 2nd chest tube 5/5/21  cxr 5/6/21 with Tracheostomy and catheter left chest tubes remain. , There are significant diffuse advanced infiltrates again noted. No pneumothorax. Above findings are similar to study earlier in the day. Present film shows a left jugular line inserted   with tip entering the superior vena cava noted   cxr 5/30/21 with Tracheostomy cannula left chest catheter reidentified in position. No change small simple left apical pneumothorax. No change bilateral airspace opacities. Trace left pleural effusion suggested noted   cxr 5/31/21 with Status post tracheostomy. Status post left chest pigtail catheter. Trace left pneumothorax is unchanged. Cardiomegaly. Moderate to severe multifocal patchy opacificationof both lungs, left greater than right, is unchanged. Nonobstructive bowel gas pattern. No dilated loops of small or large bowel noted   s/p removal of L pigtail.   CXR 6/1/21 post l pig tail removal with Left pigtail catheter seen earlier in the day has been removed. Persistent amorphous infiltrate at the site is seen. No pneumothorax noted above.  No acute thoracic surgical intervention at this time,  s/p surgical placement of gastrostomy tube 4/23/2021.   abd xray 5/10/21 with ileus noted   dressing changed daily for seroma   abd xray 5/21/21 with Decreased bowel ileus compared to prior 5/20/2021 exam noted above.   abd xray 5/22/21 with No dilated loops of bowel noted above.   cont tube feeding   CT scan of the chest 5/13/21 demonstrates diffuse bilateral infiltrates with a region of consolidation at the left lung base. Small left pneumothorax. 2 left chest tubes noted in place noted above.  CT scan of the abdomen and pelvis 5/13/21 demonstrates diffuse dilatation of small and large bowel loops most consistent with ileus noted   xray abd with  5/14/21 with Ingested contrast within nonobstructed large bowel to the level of rectum. No acute radiographic intra-abdominal findings noted   ct abd-pelv 5/27/21 with Delayed nephrographic phase enhancement of the nonobstructed bilateral kidneys, suggestive of acute renal insufficiency. No evidence of bowel obstruction. Proctitis noted above.   id f/u   patient completed course of Meropenem  leukocytosis resolved  sputum cx with Enterobacter aerogenes (Carbapenem Resistant) noted above   afebrile  Completed  meropenem, s/p 1 dose of Vancomycin   completed zosyn  lispro ss   s/p 4 units prbc for anemia  f/u h/h    transfuse prbc as needed  heme onc f/u  Haptoglobin normal  no hemolysis, Fe/B12/folate adequate  hemolysis is unlikely even his baseline haptoglobin may be very elevated due to COVID  direct pamella neg noted    psych f/u/  C/w Klonopin to 1 mg q8h standing (no PRNs), with plan to further taper as tolerated  cont Seroquel to 50 mg BID standing starting tonight  Continue delirium precautions: Frequent reorientation, familiar pictures and objects at bedside, natural light in daytime,   consistent sleep/wake schedule, adequate hydration and nutrition, sensory aids (hearing aids, glasses) present if needed, minimizing noise and overstimulation,   clustering care to minimize overnight interruptions, judicious use of deliriogenic medications (anticholinergics, benzodiazepines and opioid analgesics), minimize use of restraints  phys tx eval  cont current meds   mgmt as per scu

## 2021-06-06 NOTE — PROGRESS NOTE ADULT - ASSESSMENT
55M, no PMH, PSH appy and moody 1990s presented 3/14 with x9 days worsening cough, subjective fevers, and SOB, with x2-3 days dysuria and central, non-radiating, constant CP, admitted to [facility] for management of COVID PNA. Given discomfort of breathing, placed on 4L NC, with improvement. Labs notable for lymphopenia and neutrophilia despite WBC wnl, d-dimer 423, AST//114, lactate 3.3, and . Trop negative x1. +COVID. CXR notable for b/l infiltrates, L>R. Given x1 dose Tylenol and 1L IVF bolus in ED. Initially started on Remdesivir and Dexamethasone, Remdesivir was later discontinued secondary to positive antibodies and elevated LFT's. Not a  candidate for Tociluzimab due to elevated LFT. Patient continued to have respiratory distress requiring 15 L NRB support.    3/19/2021- Patient got transferred to ICU  due to increased work of breathing despite being on 15 L NRB . Patient was on HFNC to keep saturation >90%. Chest X ray showed Grossly stable mild left apical pneumothorax. Stable small pneumomediastinum. Soft tissue emphysema at the neck bases again noted. Stable patchy bilateral pulmonary infiltrates. Thoracic surgery consulted, no intervention at this time.  3/22/2021 - Chest X ray revealed trace ptx right and mild left pneumothorax  3/24/2021 - Overnight pt saturation remains in early 90s and late 80s . Pt remains on HFNC. CXR stable  3/25/2021- Chest X ray showed Overlying chin obscures lung apices. Questionable tiny left apical pneumothorax. Trial of semi- pulse steroid .. solumedrol 250 bid x 3 days   3/28- Started on solumedrol 40 mg bid  3/29/2021 - At 5.30 am, pt was in severe respiratory distress with tachypneic in 40s, tachycardic in 140s, saturating at high 70s, so decision was made to sedate and intubate the pt. Anesthesiologist intubated the patient. A central line, NGT  was placed  4/1/2021- Palliative care consulted. patient's brother/Idalgo ([phone]) has agreed to act as surrogate. Prednisone tapered to  once daily   4/6/2021- Left TLC placed, Hypertensive - s/p Lopressor 5 mg X 1  4/8/2021- Lasix daily and HCTz 50 mg added to aim for negative fluid balance, Left Chest tube placed, added Albumin (4/8-4/10)  4/11/2021-patient was having fever 100.8 rectal, s/p Tylenol , s/p 1 dose of vancomycin, on zosyn - New infiltrate on CXR ,likely developing VAP  4/13/2021- Continue to spike fevers, Zosyn switched to Meropenem. ID- Dr Anand following, new TLC placed  4/15/2021- Chest tuber discontinued   4/18/2021- BP running on lower side, Started on phenylephrine, Ibuprofen D/Connor , FiO2 increased to 70%Patient had large pneumothorax note don Left, thoracic surgery was consulted ,  left chest tube placed.  4/20/2021- Patient received 1 dose of Epifanio for vent asynchrony, A line placed, vent settings changed fro mPC to CVVC     4/21/2021- Trach placed  4/23/2021- PEG tube placed  4/25/2021 - cxr 4/25/21 with A 40% LEFT pneumothorax. LEFT multi-sidehole pigtail catheter overlies LEFT lower hemithorax.. Bilateral multifocal and diffuse ill-defined airspace opacities..  Follow-up AP portable chest radiograph 4/25/2021 AT 8:58 AM: Residual LEFT 30% upper zone pneumothorax.  4/26/2021- Patient noted to have hemoglobin drop to 6.8 from 7.5, will transfuse one unit of PRBC and monitor CBC.   4/27/2021- Patient's hemoglobin dropped again to 7, will give two unit of PRBC  4/28/2021- On 4/28 Hb 6.8, s/p 1 unit of PRBC hb 7.5 now. 4 PRBC total  CTH and CT abdomen w/o contrast no evidence of bleed  No active signs of bleeding (No hematemesis, melena or hematuria)  F/u hemolysis w/u- negative so far, elevated reticulocytes . Dr Andrade consulted  5/2/2021-Patients Hb remained stable, was tachypneic and breathing over the vent so 1 mg push of Dilaudid X 2 was given. V: 20/400/60%/3+ on precedex drip at 1.5.   5/3/2021- patient was found to have large gastric bubble, G tube was connected to wall suction, repeat CXR showed improvement.  5/5/2021 - Hemoglobin dropped from 8.8 to 7.4.Will add bactrim empirically as patient is on chronic prednisone for organizing pneumonia. Tube feeds were held because of diarrhea  5/6/2021- CT chest was done, which showed mild PTX. Left 3rd intercostal space Chest tube was placed. Patient was hypotensive overnight. Cristiano temperature. Was septic, will send cultures if spikes temperature again. Patient was retaining urine, andrea was placed overnight. Will start on albumin and give one dose of lasix as patient was hypotensive and edematous in upper extremities, also had low albumin. Patient was getting agitated, will give ketamine IV push and monitor if it helps.  5/10/2021- Pt tachycardic overnight and hypertensive to 228/116 w/p 1x lopressor. Stopped levophed. Pt became hypotensive. Then started phenylephrine. BP now stable. Will d/c methadone and seroquel and continue on precedex for sedation. CXR showing apical pneumothorax   5/13/2021- Pt tachycardic to 160-170 yesterday afternoon and night. Pupils dilated. Did not respond to increased doses of opiates and benzos. Developed fever to 102.3 overnight with episode of vomiting. UA was positive. started empirically on vanc/zosyn - will d/c for now as no clear source of infection. AM abdominal xray w/ likely ileus. Will restart Free water. Hold Lasix. CT abdomen done showing ileus and possible fecal matter in rectum. Fecal disimpaction performed, small ball of stool retrieved with liquid stool excreted subsequently. Spoke to Surgery RUBI Wood. Tube feeds stopped and PEG tube placed to suction with no output.. Also will restart Precedex as likely in withdrawal and clonid  5/18/2021- Patient was tachy and agitated. Later he had hypotension and bradycardia. He was given 1L bolus and was restarted on pheny after which he improved  5/25- Behavioral health consulted due to difficultyn weaning off sedation . Recommended C/w Klonopin to 1 mg q8h standing (no PRNs), with plan to further taper as tolerated. C/w Seroquel 50 mg BID standing  5/26/2021- BP was elevated, was started on labetalol. d/c andrea.  CT placed on water seal, c/w water seal  5/27/2021- Vomiting X2, held feeding, CT Abdomen/Pelvis ordered  5/28/2021- Placed on trach collar today  5/30/2021- Patient was on trach collar throughout the day yesterday and placed back on vent overnight. No other acute events overnight.    5/31/2021- Decreased Methadone to 5 mg daily and Klonopin to 0.5 mg every 8 hours. Plan was made to transfer patient to SCU  6/1 Abd discomfort. AXR results as above, no bowel obstruction. Resume PEG feedings.   6/1 TC trial. Pt tolerated only 10 minutes, RR ~30's and anxious.  Will attempt SBT with PS.   6/3  Only tolerated 10 minutes of BSBT with PS 5. Became diaphoretic and c/o SOB   6/5  Tolerated 9 hours SBT yesterday and 2.5 hours today with PS 15

## 2021-06-06 NOTE — CHART NOTE - NSCHARTNOTEFT_GEN_A_CORE
Pt's brother Brennan and family visiting at bedside.  # [phone] assisted. Updated on pt's clinical condition and plan of care.  Pt tolerating PS 15, RR 21 -27 SpO2 100%. Encouraged to rest .  All questions and concerns addressed.    Jocy Whelan NP Medicine/SCU

## 2021-06-06 NOTE — PROGRESS NOTE ADULT - SUBJECTIVE AND OBJECTIVE BOX
ARANZA CHAMBERS    SCU progress note    INTERVAL HPI/OVERNIGHT EVENTS: ***No acute events overnight. Pt seen and examined this morning. Pt awake, mouths words and gestures in response to questions. Pt mouths "no" to SOB/chest pain/nausea/abdominal discomfort.     DNR [ ]   DNI  [  ]    Covid - 19 PCR:     The 4Ms    What Matters Most: see GOC  Age appropriate Medications/Screen for High Risk Medication: Yes  Mentation: see CAM below  Mobility: defer to physical exam    The Confusion Assessment Method (CAM) Diagnostic Algorithm     1: Acute Onset or Fluctuating Course  - Is there evidence of an acute change in mental status from the patient’s baseline? Did the (abnormal) behavior  fluctuate during the day, that is, tend to come and go, or increase and decrease in severity?  [ ] YES [ ] NO     2: Inattention  - Did the patient have difficulty focusing attention, being easily distractible, or having difficulty keeping track of what was being said?  [ ] YES [ ] NO     3: Disorganized thinking  -Was the patient’s thinking disorganized or incoherent, such as rambling or irrelevant conversation, unclear or illogical flow of ideas, or unpredictable switching from subject to subject?  [ ] YES [ ] NO    4: Altered Level of consciousness?  [ ] YES [ ] NO    The diagnosis of delirium by CAM requires the presence of features 1 and 2 and either 3 or 4.    PRESSORS: [ ] YES [ ] NO  trimethoprim  40 mG/sulfamethoxazole 200 mG Suspension 160 milliGRAM(s) Oral <User Schedule>    Cardiovascular:      labetalol 100 milliGRAM(s) Enteral Tube two times a day    Pulmonary:  ALBUTerol    90 MICROgram(s) HFA Inhaler 2 Puff(s) Inhalation every 6 hours PRN    Hematalogic:  enoxaparin Injectable 40 milliGRAM(s) SubCutaneous every 24 hours    Other:  acetaminophen    Suspension .. 650 milliGRAM(s) Enteral Tube every 12 hours PRN  artificial  tears Solution 1 Drop(s) Both EYES every 4 hours PRN  ascorbic acid 1000 milliGRAM(s) Oral daily  BACItracin   Ointment 1 Application(s) Topical two times a day  bisacodyl Suppository 10 milliGRAM(s) Rectal daily PRN  calcium carbonate 1250 mG  + Vitamin D (OsCal 500 + D) 1 Tablet(s) Oral daily  chlorhexidine 2% Cloths 1 Application(s) Topical <User Schedule>  cholecalciferol 1000 Unit(s) Oral daily  clonazePAM  Tablet 0.5 milliGRAM(s) Oral every 12 hours  gabapentin 100 milliGRAM(s) Oral every 8 hours  methadone    Tablet 5 milliGRAM(s) Oral daily  naloxegol 12.5 milliGRAM(s) Oral daily  ondansetron Injectable 4 milliGRAM(s) IV Push every 6 hours PRN  pantoprazole  Injectable 40 milliGRAM(s) IV Push daily  polyethylene glycol 3350 17 Gram(s) Oral daily PRN  predniSONE   Tablet 15 milliGRAM(s) Oral daily  QUEtiapine 50 milliGRAM(s) Oral two times a day  senna 2 Tablet(s) Oral at bedtime  sodium chloride 0.9% lock flush 10 milliLiter(s) IV Push every 1 hour PRN    acetaminophen    Suspension .. 650 milliGRAM(s) Enteral Tube every 12 hours PRN  ALBUTerol    90 MICROgram(s) HFA Inhaler 2 Puff(s) Inhalation every 6 hours PRN  artificial  tears Solution 1 Drop(s) Both EYES every 4 hours PRN  ascorbic acid 1000 milliGRAM(s) Oral daily  BACItracin   Ointment 1 Application(s) Topical two times a day  bisacodyl Suppository 10 milliGRAM(s) Rectal daily PRN  calcium carbonate 1250 mG  + Vitamin D (OsCal 500 + D) 1 Tablet(s) Oral daily  chlorhexidine 2% Cloths 1 Application(s) Topical <User Schedule>  cholecalciferol 1000 Unit(s) Oral daily  clonazePAM  Tablet 0.5 milliGRAM(s) Oral every 12 hours  enoxaparin Injectable 40 milliGRAM(s) SubCutaneous every 24 hours  gabapentin 100 milliGRAM(s) Oral every 8 hours  labetalol 100 milliGRAM(s) Enteral Tube two times a day  methadone    Tablet 5 milliGRAM(s) Oral daily  naloxegol 12.5 milliGRAM(s) Oral daily  ondansetron Injectable 4 milliGRAM(s) IV Push every 6 hours PRN  pantoprazole  Injectable 40 milliGRAM(s) IV Push daily  polyethylene glycol 3350 17 Gram(s) Oral daily PRN  predniSONE   Tablet 15 milliGRAM(s) Oral daily  QUEtiapine 50 milliGRAM(s) Oral two times a day  senna 2 Tablet(s) Oral at bedtime  sodium chloride 0.9% lock flush 10 milliLiter(s) IV Push every 1 hour PRN  trimethoprim  40 mG/sulfamethoxazole 200 mG Suspension 160 milliGRAM(s) Oral <User Schedule>    Drug Dosing Weight  Height (cm): 167.6 (23 Apr 2021 23:15)  Weight (kg): 83.9 (23 Apr 2021 23:15)  BMI (kg/m2): 29.9 (23 Apr 2021 23:15)  BSA (m2): 1.93 (23 Apr 2021 23:15)    CENTRAL LINE: [ ] YES [ ] NO  LOCATION:   DATE INSERTED:  REMOVE: [ ] YES [ ] NO  EXPLAIN:    RIVERA: [ ] YES [x ] NO    DATE INSERTED:  REMOVE:  [ ] YES [ ] NO  EXPLAIN:    PAST MEDICAL & SURGICAL HISTORY:  No pertinent past medical history    S/P moody  1990s    S/P appendectomy  1990s 06-05 @ 07:01  -  06-06 @ 07:00  --------------------------------------------------------  IN: 600 mL / OUT: 400 mL / NET: 200 mL        Mode: AC/ CMV (Assist Control/ Continuous Mandatory Ventilation)  RR (machine): 18  TV (machine): 450  FiO2: 40  PEEP: 5  ITime: 1  MAP: 9  PIP: 27      PHYSICAL EXAM:    GENERAL: NAD  HEAD:  Atraumatic, Normocephalic  EYES: EOMI, PERRLA, conjunctiva and sclera clear  ENMT: No tonsillar erythema, exudates, or enlargement; Moist mucous membranes, Good dentition, No lesions  NECK: Trach intact. Supple, No JVD,   NERVOUS SYSTEM:  Alert & Oriented X3, Good concentration; Motor Strength 5/5 B/L upper and lower extremities; DTRs 2+ intact and symmetric  CHEST/LUNG:Unlabored. Trach to vent. Clear upper lobes, decreased at bases,   HEART: Regular rate and rhythm; No murmurs, rubs, or gallops  ABDOMEN: Soft, Nontender, Nondistended; Bowel sounds present. PEG tube intact.   EXTREMITIES:  2+ Peripheral Pulses, No clubbing, cyanosis, or edema  LYMPH: No lymphadenopathy noted  SKIN:       LABS:  CBC Full  -  ( 06 Jun 2021 07:33 )  WBC Count : 8.73 K/uL  RBC Count : 3.12 M/uL  Hemoglobin : 10.1 g/dL  Hematocrit : 31.0 %  Platelet Count - Automated : 254 K/uL  Mean Cell Volume : 99.4 fl  Mean Cell Hemoglobin : 32.4 pg  Mean Cell Hemoglobin Concentration : 32.6 gm/dL  Auto Neutrophil # : x  Auto Lymphocyte # : x  Auto Monocyte # : x  Auto Eosinophil # : x  Auto Basophil # : x  Auto Neutrophil % : x  Auto Lymphocyte % : x  Auto Monocyte % : x  Auto Eosinophil % : x  Auto Basophil % : x    06-06    136  |  100  |  23<H>  ----------------------------<  96  3.8   |  30  |  0.83    Ca    9.1      06 Jun 2021 07:33  Phos  3.3     06-06  Mg     2.1     06-06    TPro  6.9  /  Alb  3.3<L>  /  TBili  0.5  /  DBili  x   /  AST  32  /  ALT  59  /  AlkPhos  85  06-06              [  ]  DVT Prophylaxis  [  ]  Nutrition, Brand, Rate         Goal Rate        Abnormal Nutritional Status -  Malnutrition   Cachexia      Morbid Obesity BMI >/=40    RADIOLOGY & ADDITIONAL STUDIES:  ***    Goals of Care Discussion with Family/Proxy/Other   - see note from/family meeting set up for...     ARANZA CHAMBERS    SCU progress note    INTERVAL HPI/OVERNIGHT EVENTS: ***No acute events overnight. Pt seen and examined this morning. Pt awake, mouths words and gestures in response to questions. Pt mouths "no" to SOB/chest pain/nausea/abdominal discomfort.     DNR [ ]   DNI  [  ]Full code    Covid - 19 PCR: negative 6/6    The 4Ms    What Matters Most: see GOC  Age appropriate Medications/Screen for High Risk Medication: Yes  Mentation: see CAM below  Mobility: defer to physical exam    The Confusion Assessment Method (CAM) Diagnostic Algorithm     1: Acute Onset or Fluctuating Course  - Is there evidence of an acute change in mental status from the patient’s baseline? Did the (abnormal) behavior  fluctuate during the day, that is, tend to come and go, or increase and decrease in severity?  [ ] YES [x ] NO     2: Inattention  - Did the patient have difficulty focusing attention, being easily distractible, or having difficulty keeping track of what was being said?  [ ] YES [x ] NO     3: Disorganized thinking  -Was the patient’s thinking disorganized or incoherent, such as rambling or irrelevant conversation, unclear or illogical flow of ideas, or unpredictable switching from subject to subject?  [ ] YES [x ] NO    4: Altered Level of consciousness?  [ ] YES [x ] NO    The diagnosis of delirium by CAM requires the presence of features 1 and 2 and either 3 or 4.    PRESSORS: [ ] YES [ ] NO  trimethoprim  40 mG/sulfamethoxazole 200 mG Suspension 160 milliGRAM(s) Oral <User Schedule>    Cardiovascular:      labetalol 100 milliGRAM(s) Enteral Tube two times a day    Pulmonary:  ALBUTerol    90 MICROgram(s) HFA Inhaler 2 Puff(s) Inhalation every 6 hours PRN    Hematalogic:  enoxaparin Injectable 40 milliGRAM(s) SubCutaneous every 24 hours    Other:  acetaminophen    Suspension .. 650 milliGRAM(s) Enteral Tube every 12 hours PRN  artificial  tears Solution 1 Drop(s) Both EYES every 4 hours PRN  ascorbic acid 1000 milliGRAM(s) Oral daily  BACItracin   Ointment 1 Application(s) Topical two times a day  bisacodyl Suppository 10 milliGRAM(s) Rectal daily PRN  calcium carbonate 1250 mG  + Vitamin D (OsCal 500 + D) 1 Tablet(s) Oral daily  chlorhexidine 2% Cloths 1 Application(s) Topical <User Schedule>  cholecalciferol 1000 Unit(s) Oral daily  clonazePAM  Tablet 0.5 milliGRAM(s) Oral every 12 hours  gabapentin 100 milliGRAM(s) Oral every 8 hours  methadone    Tablet 5 milliGRAM(s) Oral daily  naloxegol 12.5 milliGRAM(s) Oral daily  ondansetron Injectable 4 milliGRAM(s) IV Push every 6 hours PRN  pantoprazole  Injectable 40 milliGRAM(s) IV Push daily  polyethylene glycol 3350 17 Gram(s) Oral daily PRN  predniSONE   Tablet 15 milliGRAM(s) Oral daily  QUEtiapine 50 milliGRAM(s) Oral two times a day  senna 2 Tablet(s) Oral at bedtime  sodium chloride 0.9% lock flush 10 milliLiter(s) IV Push every 1 hour PRN    acetaminophen    Suspension .. 650 milliGRAM(s) Enteral Tube every 12 hours PRN  ALBUTerol    90 MICROgram(s) HFA Inhaler 2 Puff(s) Inhalation every 6 hours PRN  artificial  tears Solution 1 Drop(s) Both EYES every 4 hours PRN  ascorbic acid 1000 milliGRAM(s) Oral daily  BACItracin   Ointment 1 Application(s) Topical two times a day  bisacodyl Suppository 10 milliGRAM(s) Rectal daily PRN  calcium carbonate 1250 mG  + Vitamin D (OsCal 500 + D) 1 Tablet(s) Oral daily  chlorhexidine 2% Cloths 1 Application(s) Topical <User Schedule>  cholecalciferol 1000 Unit(s) Oral daily  clonazePAM  Tablet 0.5 milliGRAM(s) Oral every 12 hours  enoxaparin Injectable 40 milliGRAM(s) SubCutaneous every 24 hours  gabapentin 100 milliGRAM(s) Oral every 8 hours  labetalol 100 milliGRAM(s) Enteral Tube two times a day  methadone    Tablet 5 milliGRAM(s) Oral daily  naloxegol 12.5 milliGRAM(s) Oral daily  ondansetron Injectable 4 milliGRAM(s) IV Push every 6 hours PRN  pantoprazole  Injectable 40 milliGRAM(s) IV Push daily  polyethylene glycol 3350 17 Gram(s) Oral daily PRN  predniSONE   Tablet 15 milliGRAM(s) Oral daily  QUEtiapine 50 milliGRAM(s) Oral two times a day  senna 2 Tablet(s) Oral at bedtime  sodium chloride 0.9% lock flush 10 milliLiter(s) IV Push every 1 hour PRN  trimethoprim  40 mG/sulfamethoxazole 200 mG Suspension 160 milliGRAM(s) Oral <User Schedule>    Drug Dosing Weight  Height (cm): 167.6 (23 Apr 2021 23:15)  Weight (kg): 83.9 (23 Apr 2021 23:15)  BMI (kg/m2): 29.9 (23 Apr 2021 23:15)  BSA (m2): 1.93 (23 Apr 2021 23:15)    CENTRAL LINE: [ ] YES [ x] NO  LOCATION:   DATE INSERTED:  REMOVE: [ ] YES [ ] NO  EXPLAIN:    RIVERA: [ ] YES [x ] NO    DATE INSERTED:  REMOVE:  [ ] YES [ ] NO  EXPLAIN:    PAST MEDICAL & SURGICAL HISTORY:  No pertinent past medical history    S/P moody  [facility]    S/P appendectomy  1990s 06-05 @ 07:01  -  06-06 @ 07:00  --------------------------------------------------------  IN: 600 mL / OUT: 400 mL / NET: 200 mL        Mode: AC/ CMV (Assist Control/ Continuous Mandatory Ventilation)  RR (machine): 18  TV (machine): 450  FiO2: 40  PEEP: 5  ITime: 1  MAP: 9  PIP: 27      PHYSICAL EXAM:    GENERAL: NAD  HEAD:  Atraumatic, Normocephalic  EYES: EOMI, PERRLA, conjunctiva and sclera clear  ENMT: No tonsillar erythema, exudates, or enlargement; Moist mucous membranes, Good dentition, No lesions  NECK: Trach intact. Supple, No JVD,   NERVOUS SYSTEM:  Alert & Oriented X3, Good concentration; Motor Strength 5/5 B/L upper and lower extremities; DTRs 2+ intact and symmetric  CHEST/LUNG:Unlabored. Trach to vent. Clear upper lobes, decreased at bases,   HEART: Regular rate and rhythm; No murmurs, rubs, or gallops  ABDOMEN: Soft, Nontender, Nondistended; Bowel sounds present. PEG tube intact.   EXTREMITIES:  2+ Peripheral Pulses, No clubbing, cyanosis, or edema  LYMPH: No lymphadenopathy noted  SKIN:  IV Infiltration left post hand. Stage 1 sacrum      LABS:  CBC Full  -  ( 06 Jun 2021 07:33 )  WBC Count : 8.73 K/uL  RBC Count : 3.12 M/uL  Hemoglobin : 10.1 g/dL  Hematocrit : 31.0 %  Platelet Count - Automated : 254 K/uL  Mean Cell Volume : 99.4 fl  Mean Cell Hemoglobin : 32.4 pg  Mean Cell Hemoglobin Concentration : 32.6 gm/dL  Auto Neutrophil # : x  Auto Lymphocyte # : x  Auto Monocyte # : x  Auto Eosinophil # : x  Auto Basophil # : x  Auto Neutrophil % : x  Auto Lymphocyte % : x  Auto Monocyte % : x  Auto Eosinophil % : x  Auto Basophil % : x    06-06    136  |  100  |  23<H>  ----------------------------<  96  3.8   |  30  |  0.83    Ca    9.1      06 Jun 2021 07:33  Phos  3.3     06-06  Mg     2.1     06-06    TPro  6.9  /  Alb  3.3<L>  /  TBili  0.5  /  DBili  x   /  AST  32  /  ALT  59  /  AlkPhos  85  06-06              [  ]  DVT Prophylaxis  [  ] Abnormal Nutritional Status -  Malnutrition   Cachexia        `Diet, NPO with Tube Feed:   Tube Feeding Modality: Gastrostomy  Vital AF 1.2 Leonardo  Total Volume for 24 Hours (mL): 1200  Continuous  Starting Tube Feed Rate mL per Hour: 30  Increase Tube Feed Rate by (mL): 10     Every 8 hours  Until Goal Tube Feed Rate (mL per Hour): 50  Tube Feed Duration (in Hours): 24  Tube Feed Start Time: 12:45 (06-02-21 @ 12:44) [Active]  Diet, NPO:   Tube Feeding Modality: Gastrostomy  Vital AF 1.2 Leonardo  Total Volume for 24 Hours (mL): 1200  Continuous  Starting Tube Feed Rate mL per Hour: 30  Increase Tube Feed Rate by (mL): 20     Every 6 hours  Until Goal Tube Feed Rate (mL per Hour): 50  Tube Feed Duration (in Hours): 24  Tube Feed Start Time: 12:30 (06-02-21 @ 12:20) [Pending Verification By Attending]            RADIOLOGY & ADDITIONAL STUDIES:  ***  < from: Xray Chest 1 View- PORTABLE-Routine (Xray Chest 1 View- PORTABLE-Routine in AM.) (06.04.21 @ 09:37) >  IMPRESSION:  Partial clearing, left lung.    < end of copied text >  < from: Xray Abdomen 1 View PORTABLE -Routine (Xray Abdomen 1 View PORTABLE -Routine in AM.) (05.31.21 @ 08:49) >  Impression: Status post tracheostomy. Status post left chest pigtail catheter. Trace left pneumothorax is unchanged. Cardiomegaly. Moderate to severe multifocal patchy opacificationof both lungs, left greater than right, is unchanged.    Nonobstructive bowel gas pattern. No dilated loops of small or large bowel.    < end of copied text >        Goals of Care Discussion with Family/Proxy/Other   - see note from 4/14

## 2021-06-06 NOTE — PROGRESS NOTE ADULT - PROBLEM SELECTOR PLAN 8
Patient will require vent facility.  Currently only tolerating very short periods of SBT with PS support  Will continue SBT as tolerated.  PT following.

## 2021-06-06 NOTE — PROGRESS NOTE ADULT - ATTENDING COMMENTS
Problem/Plan - 1:  ·  Problem: Acute hypoxemic respiratory failure due to COVID-19.  Plan: S/p Dexamethasone , No Remdesivir  due to positive antibodies   CXR results as above  Continue Bactrim for empiric coverage   Continue with prednisone taper  Continue inhalers  Continue ventilatory support.   Will continue SBT with PS during the day as tolerated. Tolerating ~ 1 hr on PS 15 so far   Full vent support overnight  Monitor oxygenation saturation.      Problem/Plan - 2:  ·  Problem: Acute respiratory distress syndrome (ARDS) due to COVID-19 virus.  Plan: Slowly improving.  CXR improved   Continue mechanical ventilation SBT as tolerated.  Serial CXRs  Monitor oxygen saturation.

## 2021-06-06 NOTE — PROGRESS NOTE ADULT - PROBLEM SELECTOR PLAN 1
S/p Dexamethasone , No Remdesivir  due to positive antibodies   CXR results as above  Continue Bactrim for empiric coverage   Continue with prednisone taper  Continue inhalers  Continue ventilatory support.   Will continue SBT with PS during the day as tolerated. Tolerating ~ 1 hr on PS 15 so far   Full vent support overnight  Monitor oxygenation saturation.

## 2021-06-06 NOTE — PROGRESS NOTE ADULT - SUBJECTIVE AND OBJECTIVE BOX
Pt is awake, alert, lying in bed in NAD. Still on ventilator support.      INTERVAL HPI/OVERNIGHT EVENTS:      VITAL SIGNS:  T(F): 97.6 (06-06-21 @ 06:00)  HR: 80 (06-06-21 @ 07:30)  BP: 147/70 (06-06-21 @ 06:00)  RR: 19 (06-06-21 @ 06:00)  SpO2: 100% (06-06-21 @ 07:30)  Wt(kg): --  I&O's Detail    05 Jun 2021 07:01  -  06 Jun 2021 07:00  --------------------------------------------------------  IN:    Vital1.5: 600 mL  Total IN: 600 mL    OUT:    Voided (mL): 400 mL  Total OUT: 400 mL    Total NET: 200 mL        Mode: AC/ CMV (Assist Control/ Continuous Mandatory Ventilation)  RR (machine): 18  TV (machine): 450  FiO2: 40  PEEP: 5  ITime: 1  MAP: 9  PIP: 27        REVIEW OF SYSTEMS:    CONSTITUTIONAL:  No fevers, chills, sweats    HEENT:  Eyes:  No diplopia or blurred vision. ENT:  No earache, sore throat or runny nose.    CARDIOVASCULAR:  No pressure, squeezing, tightness, or heaviness about the chest; no palpitations.    RESPIRATORY:  Per HPI    GASTROINTESTINAL:  No abdominal pain, nausea, vomiting or diarrhea.    GENITOURINARY:  No dysuria, frequency or urgency.    NEUROLOGIC:  No paresthesias, fasciculations, seizures or weakness.    PSYCHIATRIC:  No disorder of thought or mood.      PHYSICAL EXAM:    Constitutional: Well developed and nourished  Eyes: Perrla  ENMT: normal  Neck: supple  Respiratory: good air entry  Cardiovascular: S1 S2 regular  Gastrointestinal: Soft, Non tender  Extremities: No edema  Vascular: normal  Neurological: Awake, alert,Ox3  Musculoskeletal: Normal      MEDICATIONS  (STANDING):  ascorbic acid 1000 milliGRAM(s) Oral daily  BACItracin   Ointment 1 Application(s) Topical two times a day  calcium carbonate 1250 mG  + Vitamin D (OsCal 500 + D) 1 Tablet(s) Oral daily  chlorhexidine 2% Cloths 1 Application(s) Topical <User Schedule>  cholecalciferol 1000 Unit(s) Oral daily  clonazePAM  Tablet 0.5 milliGRAM(s) Oral every 12 hours  enoxaparin Injectable 40 milliGRAM(s) SubCutaneous every 24 hours  gabapentin 100 milliGRAM(s) Oral every 8 hours  labetalol 100 milliGRAM(s) Enteral Tube two times a day  methadone    Tablet 5 milliGRAM(s) Oral daily  naloxegol 12.5 milliGRAM(s) Oral daily  pantoprazole  Injectable 40 milliGRAM(s) IV Push daily  predniSONE   Tablet 15 milliGRAM(s) Oral daily  QUEtiapine 50 milliGRAM(s) Oral two times a day  senna 2 Tablet(s) Oral at bedtime  trimethoprim  40 mG/sulfamethoxazole 200 mG Suspension 160 milliGRAM(s) Oral <User Schedule>    MEDICATIONS  (PRN):  acetaminophen    Suspension .. 650 milliGRAM(s) Enteral Tube every 12 hours PRN Temp greater or equal to 38C (100.4F), Mild Pain (1 - 3)  ALBUTerol    90 MICROgram(s) HFA Inhaler 2 Puff(s) Inhalation every 6 hours PRN Shortness of Breath and/or Wheezing  artificial  tears Solution 1 Drop(s) Both EYES every 4 hours PRN Dry Eyes  bisacodyl Suppository 10 milliGRAM(s) Rectal daily PRN Constipation  ondansetron Injectable 4 milliGRAM(s) IV Push every 6 hours PRN Nausea and/or Vomiting  polyethylene glycol 3350 17 Gram(s) Oral daily PRN Constipation  sodium chloride 0.9% lock flush 10 milliLiter(s) IV Push every 1 hour PRN Pre/post blood products, medications, blood draw, and to maintain line patency      Allergies    No Known Allergies    Intolerances        LABS:                        10.1   8.73  )-----------( 254      ( 06 Jun 2021 07:33 )             31.0     06-06    136  |  100  |  23<H>  ----------------------------<  96  3.8   |  30  |  0.83    Ca    9.1      06 Jun 2021 07:33  Phos  3.3     06-06  Mg     2.1     06-06    TPro  6.9  /  Alb  3.3<L>  /  TBili  0.5  /  DBili  x   /  AST  32  /  ALT  59  /  AlkPhos  85  06-06    CAPILLARY BLOOD GLUCOSE    POCT Blood Glucose.: 104 mg/dL (05 Jun 2021 21:24)  POCT Blood Glucose.: 139 mg/dL (05 Jun 2021 16:31)  POCT Blood Glucose.: 126 mg/dL (05 Jun 2021 12:09)        RADIOLOGY & ADDITIONAL TESTS:    CXR:   IMPRESSION:  Partial clearing, left lung.    Ct scan chest:    ekg;    echo:

## 2021-06-06 NOTE — PROGRESS NOTE ADULT - PROBLEM SELECTOR PLAN 4
Resolved.   Abd xray results as above  Continue PEG tube feedings  Continue with bowel regimen  Continue Naloxegol  Continue with Zofran prn  Serial abdominal exam  Monitor PEG residual.

## 2021-06-07 LAB
ALBUMIN SERPL ELPH-MCNC: 3.2 G/DL — LOW (ref 3.5–5)
ALP SERPL-CCNC: 91 U/L — SIGNIFICANT CHANGE UP (ref 40–120)
ALT FLD-CCNC: 94 U/L DA — HIGH (ref 10–60)
ANION GAP SERPL CALC-SCNC: 8 MMOL/L — SIGNIFICANT CHANGE UP (ref 5–17)
AST SERPL-CCNC: 45 U/L — HIGH (ref 10–40)
BASOPHILS # BLD AUTO: 0.02 K/UL — SIGNIFICANT CHANGE UP (ref 0–0.2)
BASOPHILS NFR BLD AUTO: 0.2 % — SIGNIFICANT CHANGE UP (ref 0–2)
BILIRUB SERPL-MCNC: 0.4 MG/DL — SIGNIFICANT CHANGE UP (ref 0.2–1.2)
BUN SERPL-MCNC: 27 MG/DL — HIGH (ref 7–18)
CALCIUM SERPL-MCNC: 9 MG/DL — SIGNIFICANT CHANGE UP (ref 8.4–10.5)
CHLORIDE SERPL-SCNC: 99 MMOL/L — SIGNIFICANT CHANGE UP (ref 96–108)
CO2 SERPL-SCNC: 29 MMOL/L — SIGNIFICANT CHANGE UP (ref 22–31)
CREAT SERPL-MCNC: 0.8 MG/DL — SIGNIFICANT CHANGE UP (ref 0.5–1.3)
EOSINOPHIL # BLD AUTO: 0.03 K/UL — SIGNIFICANT CHANGE UP (ref 0–0.5)
EOSINOPHIL NFR BLD AUTO: 0.4 % — SIGNIFICANT CHANGE UP (ref 0–6)
GLUCOSE BLDC GLUCOMTR-MCNC: 105 MG/DL — HIGH (ref 70–99)
GLUCOSE BLDC GLUCOMTR-MCNC: 129 MG/DL — HIGH (ref 70–99)
GLUCOSE BLDC GLUCOMTR-MCNC: 97 MG/DL — SIGNIFICANT CHANGE UP (ref 70–99)
GLUCOSE SERPL-MCNC: 116 MG/DL — HIGH (ref 70–99)
HCT VFR BLD CALC: 31.9 % — LOW (ref 39–50)
HGB BLD-MCNC: 10.3 G/DL — LOW (ref 13–17)
IMM GRANULOCYTES NFR BLD AUTO: 1.1 % — SIGNIFICANT CHANGE UP (ref 0–1.5)
LYMPHOCYTES # BLD AUTO: 0.98 K/UL — LOW (ref 1–3.3)
LYMPHOCYTES # BLD AUTO: 11.5 % — LOW (ref 13–44)
MAGNESIUM SERPL-MCNC: 2.2 MG/DL — SIGNIFICANT CHANGE UP (ref 1.6–2.6)
MCHC RBC-ENTMCNC: 32.2 PG — SIGNIFICANT CHANGE UP (ref 27–34)
MCHC RBC-ENTMCNC: 32.3 GM/DL — SIGNIFICANT CHANGE UP (ref 32–36)
MCV RBC AUTO: 99.7 FL — SIGNIFICANT CHANGE UP (ref 80–100)
MONOCYTES # BLD AUTO: 0.38 K/UL — SIGNIFICANT CHANGE UP (ref 0–0.9)
MONOCYTES NFR BLD AUTO: 4.4 % — SIGNIFICANT CHANGE UP (ref 2–14)
NEUTROPHILS # BLD AUTO: 7.05 K/UL — SIGNIFICANT CHANGE UP (ref 1.8–7.4)
NEUTROPHILS NFR BLD AUTO: 82.4 % — HIGH (ref 43–77)
NRBC # BLD: 0 /100 WBCS — SIGNIFICANT CHANGE UP (ref 0–0)
PHOSPHATE SERPL-MCNC: 3.1 MG/DL — SIGNIFICANT CHANGE UP (ref 2.5–4.5)
PLATELET # BLD AUTO: 241 K/UL — SIGNIFICANT CHANGE UP (ref 150–400)
POTASSIUM SERPL-MCNC: 3.9 MMOL/L — SIGNIFICANT CHANGE UP (ref 3.5–5.3)
POTASSIUM SERPL-SCNC: 3.9 MMOL/L — SIGNIFICANT CHANGE UP (ref 3.5–5.3)
PROT SERPL-MCNC: 7.1 G/DL — SIGNIFICANT CHANGE UP (ref 6–8.3)
RBC # BLD: 3.2 M/UL — LOW (ref 4.2–5.8)
RBC # FLD: 15 % — HIGH (ref 10.3–14.5)
SODIUM SERPL-SCNC: 136 MMOL/L — SIGNIFICANT CHANGE UP (ref 135–145)
WBC # BLD: 8.55 K/UL — SIGNIFICANT CHANGE UP (ref 3.8–10.5)
WBC # FLD AUTO: 8.55 K/UL — SIGNIFICANT CHANGE UP (ref 3.8–10.5)

## 2021-06-07 PROCEDURE — 99232 SBSQ HOSP IP/OBS MODERATE 35: CPT

## 2021-06-07 RX ADMIN — Medication 1000 MILLIGRAM(S): at 11:35

## 2021-06-07 RX ADMIN — PANTOPRAZOLE SODIUM 40 MILLIGRAM(S): 20 TABLET, DELAYED RELEASE ORAL at 11:36

## 2021-06-07 RX ADMIN — GABAPENTIN 100 MILLIGRAM(S): 400 CAPSULE ORAL at 06:14

## 2021-06-07 RX ADMIN — Medication 1 APPLICATION(S): at 17:52

## 2021-06-07 RX ADMIN — NALOXEGOL OXALATE 12.5 MILLIGRAM(S): 12.5 TABLET, FILM COATED ORAL at 11:37

## 2021-06-07 RX ADMIN — Medication 1000 UNIT(S): at 11:35

## 2021-06-07 RX ADMIN — GABAPENTIN 100 MILLIGRAM(S): 400 CAPSULE ORAL at 13:55

## 2021-06-07 RX ADMIN — Medication 160 MILLIGRAM(S): at 06:21

## 2021-06-07 RX ADMIN — SENNA PLUS 2 TABLET(S): 8.6 TABLET ORAL at 21:59

## 2021-06-07 RX ADMIN — QUETIAPINE FUMARATE 50 MILLIGRAM(S): 200 TABLET, FILM COATED ORAL at 09:37

## 2021-06-07 RX ADMIN — ENOXAPARIN SODIUM 40 MILLIGRAM(S): 100 INJECTION SUBCUTANEOUS at 11:35

## 2021-06-07 RX ADMIN — Medication 100 MILLIGRAM(S): at 06:38

## 2021-06-07 RX ADMIN — QUETIAPINE FUMARATE 50 MILLIGRAM(S): 200 TABLET, FILM COATED ORAL at 21:59

## 2021-06-07 RX ADMIN — Medication 1 APPLICATION(S): at 06:17

## 2021-06-07 RX ADMIN — Medication 15 MILLIGRAM(S): at 06:14

## 2021-06-07 RX ADMIN — GABAPENTIN 100 MILLIGRAM(S): 400 CAPSULE ORAL at 22:01

## 2021-06-07 RX ADMIN — Medication 100 MILLIGRAM(S): at 17:52

## 2021-06-07 RX ADMIN — Medication 0.5 MILLIGRAM(S): at 17:52

## 2021-06-07 RX ADMIN — CHLORHEXIDINE GLUCONATE 1 APPLICATION(S): 213 SOLUTION TOPICAL at 06:17

## 2021-06-07 RX ADMIN — Medication 0.5 MILLIGRAM(S): at 06:14

## 2021-06-07 RX ADMIN — Medication 1 TABLET(S): at 11:35

## 2021-06-07 NOTE — BH CONSULTATION LIAISON PROGRESS NOTE - NSBHASSESSMENTFT_PSY_ALL_CORE
55M from [facility], single and living alone working in a restaurant, with no reported PHx or MHx, BIB self on 3/14 c/o fever, cough and SOB and found to have COVID-19 PNA, transferred to ICU on 4/9 for AHRF, later developed bacterial PNA now s/p trach and PEG. Psych consulted for med management due to difficulty weaning off sedation. On initial exam, pt is highly somnolent and unable to participate, but hx and current presentation appear highly c/w acute multifactorial delirium i/s/o AHRF, BZD withdrawal and opioid withdrawal. On f/u today, pt presents more awake, alert and interactive, and communicates in writing, reporting that he feels "a little bit tired" and denying SI. Pt disposition will depend on clinical course, but there is currently no reason to believe that pt will require admission to IP psychiatry.

## 2021-06-07 NOTE — PROGRESS NOTE ADULT - SUBJECTIVE AND OBJECTIVE BOX
Pt is awake, alert, lying in bed in NAD. Clinically unchanged.     INTERVAL HPI/OVERNIGHT EVENTS:      VITAL SIGNS:  T(F): 98 (06-07-21 @ 06:27)  HR: 101 (06-07-21 @ 09:40)  BP: 146/83 (06-07-21 @ 09:30)  RR: 20 (06-07-21 @ 06:27)  SpO2: 98% (06-07-21 @ 09:40)  Wt(kg): --  I&O's Detail    Mode: AC/ CMV (Assist Control/ Continuous Mandatory Ventilation)  RR (machine): 18  TV (machine): 450  FiO2: 40  PEEP: 5  ITime: 0.7  MAP: 10  PIP: 26        REVIEW OF SYSTEMS:    CONSTITUTIONAL:  No fevers, chills, sweats    HEENT:  Eyes:  No diplopia or blurred vision. ENT:  No earache, sore throat or runny nose.    CARDIOVASCULAR:  No pressure, squeezing, tightness, or heaviness about the chest; no palpitations.    RESPIRATORY:  Per HPI    GASTROINTESTINAL:  No abdominal pain, nausea, vomiting or diarrhea.    GENITOURINARY:  No dysuria, frequency or urgency.    NEUROLOGIC:  No paresthesias, fasciculations, seizures or weakness.    PSYCHIATRIC:  No disorder of thought or mood.      PHYSICAL EXAM:    Constitutional: Well developed and nourished  Eyes:Perrla  ENMT: normal  Neck:supple  Respiratory: good air entry; trach - MV support.   Cardiovascular: S1 S2 regular  Gastrointestinal: Soft, Non tender; PEG.   Extremities: No edema  Vascular:normal  Neurological:Awake, alert,Ox3  Musculoskeletal: weak, bed       MEDICATIONS  (STANDING):  ascorbic acid 1000 milliGRAM(s) Oral daily  BACItracin   Ointment 1 Application(s) Topical two times a day  calcium carbonate 1250 mG  + Vitamin D (OsCal 500 + D) 1 Tablet(s) Oral daily  chlorhexidine 2% Cloths 1 Application(s) Topical <User Schedule>  cholecalciferol 1000 Unit(s) Oral daily  clonazePAM  Tablet 0.5 milliGRAM(s) Oral every 12 hours  enoxaparin Injectable 40 milliGRAM(s) SubCutaneous every 24 hours  gabapentin 100 milliGRAM(s) Oral every 8 hours  labetalol 100 milliGRAM(s) Enteral Tube two times a day  naloxegol 12.5 milliGRAM(s) Oral daily  pantoprazole  Injectable 40 milliGRAM(s) IV Push daily  predniSONE   Tablet 15 milliGRAM(s) Oral daily  QUEtiapine 50 milliGRAM(s) Oral two times a day  senna 2 Tablet(s) Oral at bedtime  trimethoprim  40 mG/sulfamethoxazole 200 mG Suspension 160 milliGRAM(s) Oral <User Schedule>    MEDICATIONS  (PRN):  acetaminophen    Suspension .. 650 milliGRAM(s) Enteral Tube every 12 hours PRN Temp greater or equal to 38C (100.4F), Mild Pain (1 - 3)  ALBUTerol    90 MICROgram(s) HFA Inhaler 2 Puff(s) Inhalation every 6 hours PRN Shortness of Breath and/or Wheezing  artificial  tears Solution 1 Drop(s) Both EYES every 4 hours PRN Dry Eyes  bisacodyl Suppository 10 milliGRAM(s) Rectal daily PRN Constipation  ondansetron Injectable 4 milliGRAM(s) IV Push every 6 hours PRN Nausea and/or Vomiting  polyethylene glycol 3350 17 Gram(s) Oral daily PRN Constipation  sodium chloride 0.9% lock flush 10 milliLiter(s) IV Push every 1 hour PRN Pre/post blood products, medications, blood draw, and to maintain line patency      Allergies    No Known Allergies    Intolerances        LABS:                        10.3   8.55  )-----------( 241      ( 07 Jun 2021 11:03 )             31.9     06-07    x   |  x   |  27<H>  ----------------------------<  116<H>  x    |  29  |  x     Ca    9.0      07 Jun 2021 11:03  Phos  3.3     06-06  Mg     2.2     06-07    TPro  x   /  Alb  3.2<L>  /  TBili  x   /  DBili  x   /  AST  x   /  ALT  x   /  AlkPhos  x   06-07              CAPILLARY BLOOD GLUCOSE            RADIOLOGY & ADDITIONAL TESTS:    CXR:    Ct scan chest:    ekg;    echo:

## 2021-06-07 NOTE — PROGRESS NOTE ADULT - SUBJECTIVE AND OBJECTIVE BOX
Patient is a 55y old  Male who presents with a chief complaint of SOB (06 Jun 2021 10:23)    pt seen in icu [  ], reg med floor scu [ x  ], bed [ x ], chair at bedside [   ], awake and responsive [ x ], mildly sedated, [  ],    nad [x  ]        Allergies    No Known Allergies        Vitals    T(F): 98.4 (06-06-21 @ 21:25), Max: 98.4 (06-06-21 @ 21:25)  HR: 89 (06-06-21 @ 21:25) (80 - 98)  BP: 145/87 (06-06-21 @ 21:25) (142/87 - 145/87)  RR: 20 (06-06-21 @ 21:25) (20 - 22)  SpO2: 100% (06-06-21 @ 21:25) (100% - 100%)  Wt(kg): --  CAPILLARY BLOOD GLUCOSE      POCT Blood Glucose.: 104 mg/dL (05 Jun 2021 21:24)      Labs                          10.1   8.73  )-----------( 254      ( 06 Jun 2021 07:33 )             31.0       06-06    136  |  100  |  23<H>  ----------------------------<  96  3.8   |  30  |  0.83    Ca    9.1      06 Jun 2021 07:33  Phos  3.3     06-06  Mg     2.1     06-06    TPro  6.9  /  Alb  3.3<L>  /  TBili  0.5  /  DBili  x   /  AST  32  /  ALT  59  /  AlkPhos  85  06-06            .Sputum Sputum  04-16 @ 04:42   Moderate Enterobacter aerogenes (Carbapenem Resistant)  Normal Respiratory Monserrat present  --  Enterobacter aerogenes (Carbapenem Resistant)      .Urine Clean Catch (Midstream)  03-15 @ 00:52   No growth  --  --          Radiology Results      Meds    MEDICATIONS  (STANDING):  ascorbic acid 1000 milliGRAM(s) Oral daily  BACItracin   Ointment 1 Application(s) Topical two times a day  calcium carbonate 1250 mG  + Vitamin D (OsCal 500 + D) 1 Tablet(s) Oral daily  chlorhexidine 2% Cloths 1 Application(s) Topical <User Schedule>  cholecalciferol 1000 Unit(s) Oral daily  clonazePAM  Tablet 0.5 milliGRAM(s) Oral every 12 hours  enoxaparin Injectable 40 milliGRAM(s) SubCutaneous every 24 hours  gabapentin 100 milliGRAM(s) Oral every 8 hours  labetalol 100 milliGRAM(s) Enteral Tube two times a day  methadone    Tablet 5 milliGRAM(s) Oral daily  naloxegol 12.5 milliGRAM(s) Oral daily  pantoprazole  Injectable 40 milliGRAM(s) IV Push daily  predniSONE   Tablet 15 milliGRAM(s) Oral daily  QUEtiapine 50 milliGRAM(s) Oral two times a day  senna 2 Tablet(s) Oral at bedtime  trimethoprim  40 mG/sulfamethoxazole 200 mG Suspension 160 milliGRAM(s) Oral <User Schedule>      MEDICATIONS  (PRN):  acetaminophen    Suspension .. 650 milliGRAM(s) Enteral Tube every 12 hours PRN Temp greater or equal to 38C (100.4F), Mild Pain (1 - 3)  ALBUTerol    90 MICROgram(s) HFA Inhaler 2 Puff(s) Inhalation every 6 hours PRN Shortness of Breath and/or Wheezing  artificial  tears Solution 1 Drop(s) Both EYES every 4 hours PRN Dry Eyes  bisacodyl Suppository 10 milliGRAM(s) Rectal daily PRN Constipation  ondansetron Injectable 4 milliGRAM(s) IV Push every 6 hours PRN Nausea and/or Vomiting  polyethylene glycol 3350 17 Gram(s) Oral daily PRN Constipation  sodium chloride 0.9% lock flush 10 milliLiter(s) IV Push every 1 hour PRN Pre/post blood products, medications, blood draw, and to maintain line patency      Physical Exam    Neuro :  no focal deficits  Respiratory: CTA B/L  CV: RRR, S1S2, no murmurs,   Abdominal: Soft, NT, ND +BS, gastrostomy tube inplace  Extremities: edema of extrem, + peripheral pulses      ASSESSMENT      Hypoxemia 2nd to covid pna   transaminitis  prediabetes  h/o appendectomy  cholecystectomy        PLAN    cont cont precautions  covid 5/14/21 neg noted above  d/c remdesevir given covid ab positive noted   completed dexamethasone   started pulse steroids for 3 days - 250mg solumedrol bid now tapered off 4/14/21   prednisone 20 daily d/c 6/1/21   cont on bactrim empirically, TIW   cont asa, vit c,    cont albuterol inhaler   pulm f/u  procalcitonin, D-dimer, crp, ldh, ferritin, lactate noted ,    cont tylenol prn,   cont robitussin prn   pt off precedex    pt on methadone   pain mgmt eval noted   off phenylephrine drip for hypotension  clonidine held 2nd to low bp  s/p intubation 3/29/21   s/p tracheostomy 4/21/21  O2 sat  100% (100% - 100%) mv 40%  O2 via mech vent   cont wean as tolerated   pt tolerated 9hrs of only pressure support 6/4/21  vent mgmt as per scu  thoracic surg f/u   s/p L chest tube replacement 4/18/21 for recurrence of left pneumothorax   cxr 4/20/21 with Unchanged advanced infiltrates and catheter left chest tube noted   CXR 4/11/21 with : No interval change compared to one day prior. No pneumothorax noted.   unable to flush or aspirate tube fully, noted debris in tubing which was milked out.   Once material removed from tubing able to aspirated and flush fully. Also changed dressing on pigtail which appears to be in good position.   Monitor O2 status   s/p tracheostomy 4/21/21   cxr 4/21/21 with No evidence of active chest disease. Tracheostomy tube in place otherwise no significant change noted   cxr 4/25/21 with A 40% LEFT pneumothorax. LEFT multi-sidehole pigtail catheter overlies LEFT lower hemithorax.. Bilateral multifocal and diffuse ill-defined airspace opacities..  Follow-up AP portable chest radiograph 4/25/2021 AT 8:58 AM: Residual LEFT 30% upper zone pneumothorax. Otherwise no interval change.noted    s/p 2nd chest tube 5/5/21  cxr 5/6/21 with Tracheostomy and catheter left chest tubes remain. , There are significant diffuse advanced infiltrates again noted. No pneumothorax. Above findings are similar to study earlier in the day. Present film shows a left jugular line inserted   with tip entering the superior vena cava noted   cxr 5/30/21 with Tracheostomy cannula left chest catheter reidentified in position. No change small simple left apical pneumothorax. No change bilateral airspace opacities. Trace left pleural effusion suggested noted   cxr 5/31/21 with Status post tracheostomy. Status post left chest pigtail catheter. Trace left pneumothorax is unchanged. Cardiomegaly. Moderate to severe multifocal patchy opacificationof both lungs, left greater than right, is unchanged. Nonobstructive bowel gas pattern. No dilated loops of small or large bowel noted   s/p removal of L pigtail.   CXR 6/1/21 post l pig tail removal with Left pigtail catheter seen earlier in the day has been removed. Persistent amorphous infiltrate at the site is seen. No pneumothorax noted above.  No acute thoracic surgical intervention at this time,  s/p surgical placement of gastrostomy tube 4/23/2021.   abd xray 5/10/21 with ileus noted   dressing changed daily for seroma   abd xray 5/21/21 with Decreased bowel ileus compared to prior 5/20/2021 exam noted above.   abd xray 5/22/21 with No dilated loops of bowel noted above.   cont tube feeding   CT scan of the chest 5/13/21 demonstrates diffuse bilateral infiltrates with a region of consolidation at the left lung base. Small left pneumothorax. 2 left chest tubes noted in place noted above.  CT scan of the abdomen and pelvis 5/13/21 demonstrates diffuse dilatation of small and large bowel loops most consistent with ileus noted   xray abd with  5/14/21 with Ingested contrast within nonobstructed large bowel to the level of rectum. No acute radiographic intra-abdominal findings noted   ct abd-pelv 5/27/21 with Delayed nephrographic phase enhancement of the nonobstructed bilateral kidneys, suggestive of acute renal insufficiency. No evidence of bowel obstruction. Proctitis noted above.   id f/u   patient completed course of Meropenem  leukocytosis resolved  sputum cx with Enterobacter aerogenes (Carbapenem Resistant) noted above   afebrile  Completed  meropenem, s/p 1 dose of Vancomycin   completed zosyn  lispro ss   s/p 4 units prbc for anemia  f/u h/h    transfuse prbc as needed  heme onc f/u  Haptoglobin normal  no hemolysis, Fe/B12/folate adequate  hemolysis is unlikely even his baseline haptoglobin may be very elevated due to COVID  direct pamella neg noted    psych f/u/  C/w Klonopin to 1 mg q8h standing (no PRNs), with plan to further taper as tolerated  cont Seroquel to 50 mg BID standing starting tonight  Continue delirium precautions: Frequent reorientation, familiar pictures and objects at bedside, natural light in daytime,   consistent sleep/wake schedule, adequate hydration and nutrition, sensory aids (hearing aids, glasses) present if needed, minimizing noise and overstimulation,   clustering care to minimize overnight interruptions, judicious use of deliriogenic medications (anticholinergics, benzodiazepines and opioid analgesics), minimize use of restraints  phys tx eval  cont current meds   mgmt as per scu           Patient is a 55y old  Male who presents with a chief complaint of SOB (06 Jun 2021 10:23)    pt seen in icu [  ], reg med floor scu [ x  ], bed [ x ], chair at bedside [   ], awake and responsive [ x ], mildly sedated, [  ],    nad [x  ]        Allergies    No Known Allergies        Vitals    T(F): 98.4 (06-06-21 @ 21:25), Max: 98.4 (06-06-21 @ 21:25)  HR: 89 (06-06-21 @ 21:25) (80 - 98)  BP: 145/87 (06-06-21 @ 21:25) (142/87 - 145/87)  RR: 20 (06-06-21 @ 21:25) (20 - 22)  SpO2: 100% (06-06-21 @ 21:25) (100% - 100%)  Wt(kg): --  CAPILLARY BLOOD GLUCOSE      POCT Blood Glucose.: 104 mg/dL (05 Jun 2021 21:24)      Labs                          10.1   8.73  )-----------( 254      ( 06 Jun 2021 07:33 )             31.0       06-06    136  |  100  |  23<H>  ----------------------------<  96  3.8   |  30  |  0.83    Ca    9.1      06 Jun 2021 07:33  Phos  3.3     06-06  Mg     2.1     06-06    TPro  6.9  /  Alb  3.3<L>  /  TBili  0.5  /  DBili  x   /  AST  32  /  ALT  59  /  AlkPhos  85  06-06            .Sputum Sputum  04-16 @ 04:42   Moderate Enterobacter aerogenes (Carbapenem Resistant)  Normal Respiratory Monserrat present  --  Enterobacter aerogenes (Carbapenem Resistant)      .Urine Clean Catch (Midstream)  03-15 @ 00:52   No growth  --  --          Radiology Results      Meds    MEDICATIONS  (STANDING):  ascorbic acid 1000 milliGRAM(s) Oral daily  BACItracin   Ointment 1 Application(s) Topical two times a day  calcium carbonate 1250 mG  + Vitamin D (OsCal 500 + D) 1 Tablet(s) Oral daily  chlorhexidine 2% Cloths 1 Application(s) Topical <User Schedule>  cholecalciferol 1000 Unit(s) Oral daily  clonazePAM  Tablet 0.5 milliGRAM(s) Oral every 12 hours  enoxaparin Injectable 40 milliGRAM(s) SubCutaneous every 24 hours  gabapentin 100 milliGRAM(s) Oral every 8 hours  labetalol 100 milliGRAM(s) Enteral Tube two times a day  methadone    Tablet 5 milliGRAM(s) Oral daily  naloxegol 12.5 milliGRAM(s) Oral daily  pantoprazole  Injectable 40 milliGRAM(s) IV Push daily  predniSONE   Tablet 15 milliGRAM(s) Oral daily  QUEtiapine 50 milliGRAM(s) Oral two times a day  senna 2 Tablet(s) Oral at bedtime  trimethoprim  40 mG/sulfamethoxazole 200 mG Suspension 160 milliGRAM(s) Oral <User Schedule>      MEDICATIONS  (PRN):  acetaminophen    Suspension .. 650 milliGRAM(s) Enteral Tube every 12 hours PRN Temp greater or equal to 38C (100.4F), Mild Pain (1 - 3)  ALBUTerol    90 MICROgram(s) HFA Inhaler 2 Puff(s) Inhalation every 6 hours PRN Shortness of Breath and/or Wheezing  artificial  tears Solution 1 Drop(s) Both EYES every 4 hours PRN Dry Eyes  bisacodyl Suppository 10 milliGRAM(s) Rectal daily PRN Constipation  ondansetron Injectable 4 milliGRAM(s) IV Push every 6 hours PRN Nausea and/or Vomiting  polyethylene glycol 3350 17 Gram(s) Oral daily PRN Constipation  sodium chloride 0.9% lock flush 10 milliLiter(s) IV Push every 1 hour PRN Pre/post blood products, medications, blood draw, and to maintain line patency      Physical Exam    Neuro :  no focal deficits  Respiratory: CTA B/L  CV: RRR, S1S2, no murmurs,   Abdominal: Soft, NT, ND +BS, gastrostomy tube inplace  Extremities: edema of extrem, + peripheral pulses      ASSESSMENT      Hypoxemia 2nd to covid pna   transaminitis  prediabetes  h/o appendectomy  cholecystectomy        PLAN    cont cont precautions  covid 5/14/21 neg noted above  d/c remdesevir given covid ab positive noted   completed dexamethasone   started pulse steroids for 3 days - 250mg solumedrol bid now tapered off 4/14/21   prednisone 20 daily d/c 6/1/21   cont on bactrim empirically, TIW   cont asa, vit c,    cont albuterol inhaler   pulm f/u  procalcitonin, D-dimer, crp, ldh, ferritin, lactate noted ,    cont tylenol prn,   cont robitussin prn   pt off precedex    pt on methadone   pain mgmt eval noted   off phenylephrine drip for hypotension  clonidine held 2nd to low bp  s/p intubation 3/29/21   s/p tracheostomy 4/21/21  O2 sat  100% (100% - 100%) mv 40%  O2 via mech vent   cont wean as tolerated   pt tolerated 9hrs of only pressure support 6/4/21  vent mgmt as per scu  thoracic surg f/u   s/p L chest tube replacement 4/18/21 for recurrence of left pneumothorax   cxr 4/20/21 with Unchanged advanced infiltrates and catheter left chest tube noted   CXR 4/11/21 with : No interval change compared to one day prior. No pneumothorax noted.   unable to flush or aspirate tube fully, noted debris in tubing which was milked out.   Once material removed from tubing able to aspirated and flush fully. Also changed dressing on pigtail which appears to be in good position.   Monitor O2 status   s/p tracheostomy 4/21/21   cxr 4/21/21 with No evidence of active chest disease. Tracheostomy tube in place otherwise no significant change noted   cxr 4/25/21 with A 40% LEFT pneumothorax. LEFT multi-sidehole pigtail catheter overlies LEFT lower hemithorax.. Bilateral multifocal and diffuse ill-defined airspace opacities..  Follow-up [facility] portable chest radiograph 4/25/2021 AT 8:58 AM: Residual LEFT 30% upper zone pneumothorax. Otherwise no interval change.noted    s/p 2nd chest tube 5/5/21  cxr 5/6/21 with Tracheostomy and catheter left chest tubes remain. , There are significant diffuse advanced infiltrates again noted. No pneumothorax. Above findings are similar to study earlier in the day. Present film shows a left jugular line inserted   with tip entering the superior vena cava noted   cxr 5/30/21 with Tracheostomy cannula left chest catheter reidentified in position. No change small simple left apical pneumothorax. No change bilateral airspace opacities. Trace left pleural effusion suggested noted   cxr 5/31/21 with Status post tracheostomy. Status post left chest pigtail catheter. Trace left pneumothorax is unchanged. Cardiomegaly. Moderate to severe multifocal patchy opacificationof both lungs, left greater than right, is unchanged. Nonobstructive bowel gas pattern. No dilated loops of small or large bowel noted   s/p removal of L pigtail.   CXR 6/1/21 post l pig tail removal with Left pigtail catheter seen earlier in the day has been removed. Persistent amorphous infiltrate at the site is seen. No pneumothorax noted above.  No acute thoracic surgical intervention at this time,  s/p surgical placement of gastrostomy tube 4/23/2021.   abd xray 5/10/21 with ileus noted   dressing changed daily for seroma   abd xray 5/21/21 with Decreased bowel ileus compared to prior 5/20/2021 exam noted above.   abd xray 5/22/21 with No dilated loops of bowel noted above.   cont tube feeding   CT scan of the chest 5/13/21 demonstrates diffuse bilateral infiltrates with a region of consolidation at the left lung base. Small left pneumothorax. 2 left chest tubes noted in place noted above.  CT scan of the abdomen and pelvis 5/13/21 demonstrates diffuse dilatation of small and large bowel loops most consistent with ileus noted   xray abd with  5/14/21 with Ingested contrast within nonobstructed large bowel to the level of rectum. No acute radiographic intra-abdominal findings noted   ct abd-pelv 5/27/21 with Delayed nephrographic phase enhancement of the nonobstructed bilateral kidneys, suggestive of acute renal insufficiency. No evidence of bowel obstruction. Proctitis noted above.   id f/u   patient completed course of Meropenem  leukocytosis resolved  sputum cx with Enterobacter aerogenes (Carbapenem Resistant) noted above   afebrile  Completed  meropenem, s/p 1 dose of Vancomycin   completed zosyn  lispro ss   s/p 4 units prbc for anemia  f/u h/h    transfuse prbc as needed  heme onc f/u  Haptoglobin normal  no hemolysis, Fe/B12/folate adequate  hemolysis is unlikely even his baseline haptoglobin may be very elevated due to COVID  direct pamella neg noted    psych f/u/  C/w Klonopin to 1 mg q8h standing (no PRNs), with plan to further taper as tolerated  cont Seroquel to 50 mg BID standing starting tonight  Continue delirium precautions: Frequent reorientation, familiar pictures and objects at bedside, natural light in daytime,   consistent sleep/wake schedule, adequate hydration and nutrition, sensory aids (hearing aids, glasses) present if needed, minimizing noise and overstimulation,   clustering care to minimize overnight interruptions, judicious use of deliriogenic medications (anticholinergics, benzodiazepines and opioid analgesics), minimize use of restraints  phys tx eval  cont current meds   d/c planning for vent facility   mgmt as per scu

## 2021-06-07 NOTE — PROGRESS NOTE ADULT - PROBLEM SELECTOR PLAN 1
Wean ventilator support as tolerated - FiO2 @ 40%.   SBT daily  Continue to wean as tolerated.   Tracheal care  Decubitus prevention  Supplemental nutrition  Monitor oxygen sat  Vit C, D and zinc supp  Montelukast 10 mgs po Qhs  Daily CXR   G-tube with feeds  Replace lytes  Pulmonary toilet  DVT and GI PPX.

## 2021-06-07 NOTE — PROGRESS NOTE ADULT - SUBJECTIVE AND OBJECTIVE BOX
ARANZA CHAMBERS    SCU progress note    INTERVAL HPI/OVERNIGHT EVENTS: ***No overnight events    DNR [ ]   DNI  [  ]   Full Code    Covid - 19 PCR: Negative 6/6    The 4Ms    What Matters Most: see GOC  Age appropriate Medications/Screen for High Risk Medication: Yes  Mentation: see CAM below  Mobility: defer to physical exam    The Confusion Assessment Method (CAM) Diagnostic Algorithm     1: Acute Onset or Fluctuating Course  - Is there evidence of an acute change in mental status from the patient’s baseline? Did the (abnormal) behavior  fluctuate during the day, that is, tend to come and go, or increase and decrease in severity?  [ ] YES [x ] NO     2: Inattention  - Did the patient have difficulty focusing attention, being easily distractible, or having difficulty keeping track of what was being said?  [ ] YES [ x] NO     3: Disorganized thinking  -Was the patient’s thinking disorganized or incoherent, such as rambling or irrelevant conversation, unclear or illogical flow of ideas, or unpredictable switching from subject to subject?  [ ] YES [x ] NO    4: Altered Level of consciousness?  [ ] YES [x ] NO    The diagnosis of delirium by CAM requires the presence of features 1 and 2 and either 3 or 4.    PRESSORS: [ ] YES [x ] NO  trimethoprim  40 mG/sulfamethoxazole 200 mG Suspension 160 milliGRAM(s) Oral <User Schedule>    Cardiovascular:  Heart Failure  Acute   Acute on Chronic  Chronic       labetalol 100 milliGRAM(s) Enteral Tube two times a day    Pulmonary:  ALBUTerol    90 MICROgram(s) HFA Inhaler 2 Puff(s) Inhalation every 6 hours PRN    Hematalogic:  enoxaparin Injectable 40 milliGRAM(s) SubCutaneous every 24 hours    Other:  acetaminophen    Suspension .. 650 milliGRAM(s) Enteral Tube every 12 hours PRN  artificial  tears Solution 1 Drop(s) Both EYES every 4 hours PRN  ascorbic acid 1000 milliGRAM(s) Oral daily  BACItracin   Ointment 1 Application(s) Topical two times a day  bisacodyl Suppository 10 milliGRAM(s) Rectal daily PRN  calcium carbonate 1250 mG  + Vitamin D (OsCal 500 + D) 1 Tablet(s) Oral daily  chlorhexidine 2% Cloths 1 Application(s) Topical <User Schedule>  cholecalciferol 1000 Unit(s) Oral daily  clonazePAM  Tablet 0.5 milliGRAM(s) Oral every 12 hours  gabapentin 100 milliGRAM(s) Oral every 8 hours  methadone    Tablet 5 milliGRAM(s) Oral daily  naloxegol 12.5 milliGRAM(s) Oral daily  ondansetron Injectable 4 milliGRAM(s) IV Push every 6 hours PRN  pantoprazole  Injectable 40 milliGRAM(s) IV Push daily  polyethylene glycol 3350 17 Gram(s) Oral daily PRN  predniSONE   Tablet 15 milliGRAM(s) Oral daily  QUEtiapine 50 milliGRAM(s) Oral two times a day  senna 2 Tablet(s) Oral at bedtime  sodium chloride 0.9% lock flush 10 milliLiter(s) IV Push every 1 hour PRN    acetaminophen    Suspension .. 650 milliGRAM(s) Enteral Tube every 12 hours PRN  ALBUTerol    90 MICROgram(s) HFA Inhaler 2 Puff(s) Inhalation every 6 hours PRN  artificial  tears Solution 1 Drop(s) Both EYES every 4 hours PRN  ascorbic acid 1000 milliGRAM(s) Oral daily  BACItracin   Ointment 1 Application(s) Topical two times a day  bisacodyl Suppository 10 milliGRAM(s) Rectal daily PRN  calcium carbonate 1250 mG  + Vitamin D (OsCal 500 + D) 1 Tablet(s) Oral daily  chlorhexidine 2% Cloths 1 Application(s) Topical <User Schedule>  cholecalciferol 1000 Unit(s) Oral daily  clonazePAM  Tablet 0.5 milliGRAM(s) Oral every 12 hours  enoxaparin Injectable 40 milliGRAM(s) SubCutaneous every 24 hours  gabapentin 100 milliGRAM(s) Oral every 8 hours  labetalol 100 milliGRAM(s) Enteral Tube two times a day  methadone    Tablet 5 milliGRAM(s) Oral daily  naloxegol 12.5 milliGRAM(s) Oral daily  ondansetron Injectable 4 milliGRAM(s) IV Push every 6 hours PRN  pantoprazole  Injectable 40 milliGRAM(s) IV Push daily  polyethylene glycol 3350 17 Gram(s) Oral daily PRN  predniSONE   Tablet 15 milliGRAM(s) Oral daily  QUEtiapine 50 milliGRAM(s) Oral two times a day  senna 2 Tablet(s) Oral at bedtime  sodium chloride 0.9% lock flush 10 milliLiter(s) IV Push every 1 hour PRN  trimethoprim  40 mG/sulfamethoxazole 200 mG Suspension 160 milliGRAM(s) Oral <User Schedule>    Drug Dosing Weight  Height (cm): 167.6 (23 Apr 2021 23:15)  Weight (kg): 83.9 (23 Apr 2021 23:15)  BMI (kg/m2): 29.9 (23 Apr 2021 23:15)  BSA (m2): 1.93 (23 Apr 2021 23:15)    CENTRAL LINE: [ ] YES [x ] NO  LOCATION:   DATE INSERTED:  REMOVE: [ ] YES [ ] NO  EXPLAIN:    RIVERA: [ ] YES [x ] NO    DATE INSERTED:  REMOVE:  [ ] YES [ ] NO  EXPLAIN:    PAST MEDICAL & SURGICAL HISTORY:  No pertinent past medical history    S/P moody  1990s    S/P appendectomy  1990s                  Mode: AC/ CMV (Assist Control/ Continuous Mandatory Ventilation)  RR (machine): 18  TV (machine): 450  FiO2: 40  PEEP: 5  ITime: 1  MAP: 10  PIP: 25      PHYSICAL EXAM:    GENERAL: NAD, well-groomed, well-developed  HEAD:  Atraumatic, Normocephalic  EYES: EOMI, PERRLA, conjunctiva and sclera clear  ENMT: No tonsillar erythema, exudates  NECK: Supple, No JVD, tracheostomy intact  NERVOUS SYSTEM:  Alert & Oriented X3, Follows commands, moving all extremities.  CHEST/LUNG: Diminished breath sounds bilateral bases  HEART: Regular rate and rhythm; No murmurs, rubs, or gallops  ABDOMEN: Soft, Nontender, Nondistended; Bowel sounds present; Peg intact  EXTREMITIES:  2+ Peripheral Pulses, No clubbing, cyanosis, or edema  LYMPH: No lymphadenopathy noted  SKIN: Stage2 sacrum and left hand.      LABS:  CBC Full  -  ( 06 Jun 2021 07:33 )  WBC Count : 8.73 K/uL  RBC Count : 3.12 M/uL  Hemoglobin : 10.1 g/dL  Hematocrit : 31.0 %  Platelet Count - Automated : 254 K/uL  Mean Cell Volume : 99.4 fl  Mean Cell Hemoglobin : 32.4 pg  Mean Cell Hemoglobin Concentration : 32.6 gm/dL  Auto Neutrophil # : x  Auto Lymphocyte # : x  Auto Monocyte # : x  Auto Eosinophil # : x  Auto Basophil # : x  Auto Neutrophil % : x  Auto Lymphocyte % : x  Auto Monocyte % : x  Auto Eosinophil % : x  Auto Basophil % : x    06-06    136  |  100  |  23<H>  ----------------------------<  96  3.8   |  30  |  0.83    Ca    9.1      06 Jun 2021 07:33  Phos  3.3     06-06  Mg     2.1     06-06    TPro  6.9  /  Alb  3.3<L>  /  TBili  0.5  /  DBili  x   /  AST  32  /  ALT  59  /  AlkPhos  85  06-06              [  ]  DVT Prophylaxis  [  ]  Nutrition, Brand, Rate         Goal Rate        Abnormal Nutritional Status -  Malnutrition   Cachexia         RADIOLOGY & ADDITIONAL STUDIES:  ***  < from: Xray Chest 1 View- PORTABLE-Routine (Xray Chest 1 View- PORTABLE-Routine in AM.) (06.04.21 @ 09:37) >  Frontal expiratory view of the chest shows the heart to be similar in size. Tracheostomy tube is again noted.    The lungs show slight clearing of patchy left infiltrates and there is no evidence of pneumothorax nor pleural effusion.    IMPRESSION:  Partial clearing, left lung.    < end of copied text >    Goals of Care Discussion with Family/Proxy/Other   - see note from 4/14

## 2021-06-07 NOTE — PROGRESS NOTE ADULT - PROBLEM SELECTOR PLAN 2
Slowly improving.  CXR improved.  Continue mechanical ventilation SBT as tolerated.  Serial CXRs  Monitor oxygen saturation.

## 2021-06-07 NOTE — BH CONSULTATION LIAISON PROGRESS NOTE - NSBHCHARTREVIEWLAB_PSY_A_CORE FT
10.3   8.55  )-----------( 241      ( 07 Jun 2021 11:03 )             31.9   06-07    136  |  99  |  27<H>  ----------------------------<  116<H>  3.9   |  29  |  0.80    Ca    9.0      07 Jun 2021 11:03  Phos  3.1     06-07  Mg     2.2     06-07    TPro  7.1  /  Alb  3.2<L>  /  TBili  0.4  /  DBili  x   /  AST  45<H>  /  ALT  94<H>  /  AlkPhos  91  06-07  
                      9.2    7.36  )-----------( 234      ( 25 May 2021 04:07 )             27.1   05-25    141  |  106  |  15  ----------------------------<  81  4.1   |  32<H>  |  0.98    Ca    9.0      25 May 2021 04:07  Phos  2.7     05-25  Mg     2.1     05-25    TPro  6.3  /  Alb  2.9<L>  /  TBili  1.0  /  DBili  x   /  AST  29  /  ALT  37  /  AlkPhos  70  05-25  
                      9.3    13.13 )-----------( 204      ( 19 May 2021 10:48 )             29.7   05-19    139  |  100  |  21<H>  ----------------------------<  168<H>  3.7   |  31  |  0.98    Ca    8.7      19 May 2021 10:48  Phos  2.9     05-18  Mg     1.3     05-19    TPro  6.0  /  Alb  3.0<L>  /  TBili  1.0  /  DBili  x   /  AST  16  /  ALT  27  /  AlkPhos  89  05-19  
                      8.0    8.69  )-----------( 175      ( 21 May 2021 04:55 )             24.8   05-21    142  |  102  |  25<H>  ----------------------------<  76  3.4<L>   |  34<H>  |  1.31<H>    Ca    8.6      21 May 2021 04:55  Phos  2.8     05-21  Mg     2.4     05-21    TPro  5.7<L>  /  Alb  2.9<L>  /  TBili  1.0  /  DBili  x   /  AST  20  /  ALT  24  /  AlkPhos  70  05-21  
                      9.2    7.36  )-----------( 234      ( 25 May 2021 04:07 )             27.1   05-25    141  |  106  |  15  ----------------------------<  81  4.1   |  32<H>  |  0.98    Ca    9.0      25 May 2021 04:07  Phos  2.7     05-25  Mg     2.1     05-25    TPro  6.3  /  Alb  2.9<L>  /  TBili  1.0  /  DBili  x   /  AST  29  /  ALT  37  /  AlkPhos  70  05-25  
                      8.9    8.43  )-----------( 223      ( 24 May 2021 03:46 )             27.1   05-24    141  |  105  |  18  ----------------------------<  71  3.3<L>   |  32<H>  |  1.01    Ca    8.7      24 May 2021 03:46  Phos  2.7     05-24  Mg     2.0     05-24    TPro  6.2  /  Alb  2.9<L>  /  TBili  1.1  /  DBili  x   /  AST  26  /  ALT  33  /  AlkPhos  71  05-24  
                      9.2    7.36  )-----------( 234      ( 25 May 2021 04:07 )             27.1   05-25    141  |  106  |  15  ----------------------------<  81  4.1   |  32<H>  |  0.98    Ca    9.0      25 May 2021 04:07  Phos  2.7     05-25  Mg     2.1     05-25    TPro  6.3  /  Alb  2.9<L>  /  TBili  1.0  /  DBili  x   /  AST  29  /  ALT  37  /  AlkPhos  70  05-25

## 2021-06-07 NOTE — BH CONSULTATION LIAISON PROGRESS NOTE - NSBHCONSULTRECOMMENDOTHER_PSY_A_CORE FT
1. C/w Klonopin at reduced dose of 0.5 mg q8h standing (no PRNs), Seroquel at same dose (50 mg BID)  2. Continue delirium precautions: Frequent reorientation, familiar pictures and objects at bedside, natural light in daytime, consistent sleep/wake schedule, adequate hydration and nutrition, sensory aids (hearing aids, glasses) present if needed, minimizing noise and overstimulation, clustering care to minimize overnight interruptions, judicious use of deliriogenic medications (anticholinergics, benzodiazepines and opioid analgesics), minimize use of restraints  --Highly recommend pt be provided with whiteboard and marker to facilitate communication with providers  3. Medical management as directed by primary team  4. Psychiatry will continue to follow PRN for delirium management  5. Case d/w ABIGAIL Khan of primary team    Paloma Cali MD  Director, Consultation-Liaison Psychiatry Service  [phone]

## 2021-06-07 NOTE — PROGRESS NOTE ADULT - ASSESSMENT
55M, no PMH, PSH appy and moody 1990s presented 3/14 with x9 days worsening cough, subjective fevers, and SOB, with x2-3 days dysuria and central, non-radiating, constant CP, admitted to [facility] for management of COVID PNA. Given discomfort of breathing, placed on 4L NC, with improvement. Labs notable for lymphopenia and neutrophilia despite WBC wnl, d-dimer 423, AST//114, lactate 3.3, and . Trop negative x1. +COVID. CXR notable for b/l infiltrates, L>R. Given x1 dose Tylenol and 1L IVF bolus in ED. Initially started on Remdesivir and Dexamethasone, Remdesivir was later discontinued secondary to positive antibodies and elevated LFT's. Not a  candidate for Tociluzimab due to elevated LFT. Patient continued to have respiratory distress requiring 15 L NRB support.    3/19/2021- Patient got transferred to ICU  due to increased work of breathing despite being on 15 L NRB . Patient was on HFNC to keep saturation >90%. Chest X ray showed Grossly stable mild left apical pneumothorax. Stable small pneumomediastinum. Soft tissue emphysema at the neck bases again noted. Stable patchy bilateral pulmonary infiltrates. Thoracic surgery consulted, no intervention at this time.  3/22/2021 - Chest X ray revealed trace ptx right and mild left pneumothorax  3/24/2021 - Overnight pt saturation remains in early 90s and late 80s . Pt remains on HFNC. CXR stable  3/25/2021- Chest X ray showed Overlying chin obscures lung apices. Questionable tiny left apical pneumothorax. Trial of semi- pulse steroid .. solumedrol 250 bid x 3 days   3/28- Started on solumedrol 40 mg bid  3/29/2021 - At 5.30 am, pt was in severe respiratory distress with tachypneic in 40s, tachycardic in 140s, saturating at high 70s, so decision was made to sedate and intubate the pt. Anesthesiologist intubated the patient. A central line, NGT  was placed  4/1/2021- Palliative care consulted. patient's brother/Idalgo ([phone]) has agreed to act as surrogate. Prednisone tapered to  once daily   4/6/2021- Left TLC placed, Hypertensive - s/p Lopressor 5 mg X 1  4/8/2021- Lasix daily and HCTz 50 mg added to aim for negative fluid balance, Left Chest tube placed, added Albumin (4/8-4/10)  4/11/2021-patient was having fever 100.8 rectal, s/p Tylenol , s/p 1 dose of vancomycin, on zosyn - New infiltrate on CXR ,likely developing VAP  4/13/2021- Continue to spike fevers, Zosyn switched to Meropenem. ID- Dr Anand following, new TLC placed  4/15/2021- Chest tuber discontinued   4/18/2021- BP running on lower side, Started on phenylephrine, Ibuprofen D/Connor , FiO2 increased to 70%Patient had large pneumothorax note don Left, thoracic surgery was consulted ,  left chest tube placed.  4/20/2021- Patient received 1 dose of Epifanio for vent asynchrony, A line placed, vent settings changed fro mPC to CVVC     4/21/2021- Trach placed  4/23/2021- PEG tube placed  4/25/2021 - cxr 4/25/21 with A 40% LEFT pneumothorax. LEFT multi-sidehole pigtail catheter overlies LEFT lower hemithorax.. Bilateral multifocal and diffuse ill-defined airspace opacities..  Follow-up AP portable chest radiograph 4/25/2021 AT 8:58 AM: Residual LEFT 30% upper zone pneumothorax.  4/26/2021- Patient noted to have hemoglobin drop to 6.8 from 7.5, will transfuse one unit of PRBC and monitor CBC.   4/27/2021- Patient's hemoglobin dropped again to 7, will give two unit of PRBC  4/28/2021- On 4/28 Hb 6.8, s/p 1 unit of PRBC hb 7.5 now. 4 PRBC total  CTH and CT abdomen w/o contrast no evidence of bleed  No active signs of bleeding (No hematemesis, melena or hematuria)  F/u hemolysis w/u- negative so far, elevated reticulocytes . Dr Andrade consulted  5/2/2021-Patients Hb remained stable, was tachypneic and breathing over the vent so 1 mg push of Dilaudid X 2 was given. V: 20/400/60%/3+ on precedex drip at 1.5.   5/3/2021- patient was found to have large gastric bubble, G tube was connected to wall suction, repeat CXR showed improvement.  5/5/2021 - Hemoglobin dropped from 8.8 to 7.4.Will add bactrim empirically as patient is on chronic prednisone for organizing pneumonia. Tube feeds were held because of diarrhea  5/6/2021- CT chest was done, which showed mild PTX. Left 3rd intercostal space Chest tube was placed. Patient was hypotensive overnight. Cristiano temperature. Was septic, will send cultures if spikes temperature again. Patient was retaining urine, andrea was placed overnight. Will start on albumin and give one dose of lasix as patient was hypotensive and edematous in upper extremities, also had low albumin. Patient was getting agitated, will give ketamine IV push and monitor if it helps.  5/10/2021- Pt tachycardic overnight and hypertensive to 228/116 w/p 1x lopressor. Stopped levophed. Pt became hypotensive. Then started phenylephrine. BP now stable. Will d/c methadone and seroquel and continue on precedex for sedation. CXR showing apical pneumothorax   5/13/2021- Pt tachycardic to 160-170 yesterday afternoon and night. Pupils dilated. Did not respond to increased doses of opiates and benzos. Developed fever to 102.3 overnight with episode of vomiting. UA was positive. started empirically on vanc/zosyn - will d/c for now as no clear source of infection. AM abdominal xray w/ likely ileus. Will restart Free water. Hold Lasix. CT abdomen done showing ileus and possible fecal matter in rectum. Fecal disimpaction performed, small ball of stool retrieved with liquid stool excreted subsequently. Spoke to Surgery RUBI Wood. Tube feeds stopped and PEG tube placed to suction with no output.. Also will restart Precedex as likely in withdrawal and clonid  5/18/2021- Patient was tachy and agitated. Later he had hypotension and bradycardia. He was given 1L bolus and was restarted on pheny after which he improved  5/25- Behavioral health consulted due to difficultyn weaning off sedation . Recommended C/w Klonopin to 1 mg q8h standing (no PRNs), with plan to further taper as tolerated. C/w Seroquel 50 mg BID standing  5/26/2021- BP was elevated, was started on labetalol. d/c andrea.  CT placed on water seal, c/w water seal  5/27/2021- Vomiting X2, held feeding, CT Abdomen/Pelvis ordered  5/28/2021- Placed on trach collar today  5/30/2021- Patient was on trach collar throughout the day yesterday and placed back on vent overnight. No other acute events overnight.    5/31/2021- Decreased Methadone to 5 mg daily and Klonopin to 0.5 mg every 8 hours. Plan was made to transfer patient to SCU  6/1 Abd discomfort. AXR results as above, no bowel obstruction. Resume PEG feedings.   6/1 TC trial. Pt tolerated only 10 minutes, RR ~30's and anxious.  Will attempt SBT with PS.   6/3  Only tolerated 10 minutes of BSBT with PS 5. Became diaphoretic and c/o SOB   6/5  Tolerated 9 hours SBT yesterday and 2.5 hours today with PS 15

## 2021-06-07 NOTE — PROGRESS NOTE ADULT - PROBLEM SELECTOR PLAN 1
S/p Dexamethasone , No Remdesivir  due to positive antibodies   CXR results as above  Continue Bactrim for empiric coverage   Continue with prednisone taper  Continue inhalers  Continue ventilatory support.   Will continue SBT with PS during the day as tolerated. Tolerated 2.5 hours today.  Full vent support overnight  Monitor oxygen saturation.

## 2021-06-07 NOTE — PROGRESS NOTE ADULT - ATTENDING COMMENTS
Discharge planning pending placement  Cont. daily weaning trials  Physical therapy follow up   D/c methadone   Cont. Clonazepam and Seroquel for now

## 2021-06-08 LAB
GLUCOSE BLDC GLUCOMTR-MCNC: 118 MG/DL — HIGH (ref 70–99)
GLUCOSE BLDC GLUCOMTR-MCNC: 119 MG/DL — HIGH (ref 70–99)
GLUCOSE BLDC GLUCOMTR-MCNC: 127 MG/DL — HIGH (ref 70–99)
GLUCOSE BLDC GLUCOMTR-MCNC: 145 MG/DL — HIGH (ref 70–99)
GLUCOSE BLDC GLUCOMTR-MCNC: 154 MG/DL — HIGH (ref 70–99)
GLUCOSE BLDC GLUCOMTR-MCNC: 97 MG/DL — SIGNIFICANT CHANGE UP (ref 70–99)

## 2021-06-08 PROCEDURE — 99232 SBSQ HOSP IP/OBS MODERATE 35: CPT

## 2021-06-08 RX ADMIN — QUETIAPINE FUMARATE 50 MILLIGRAM(S): 200 TABLET, FILM COATED ORAL at 09:09

## 2021-06-08 RX ADMIN — POLYETHYLENE GLYCOL 3350 17 GRAM(S): 17 POWDER, FOR SOLUTION ORAL at 06:05

## 2021-06-08 RX ADMIN — Medication 1 APPLICATION(S): at 06:04

## 2021-06-08 RX ADMIN — Medication 0.5 MILLIGRAM(S): at 06:02

## 2021-06-08 RX ADMIN — Medication 1000 MILLIGRAM(S): at 11:56

## 2021-06-08 RX ADMIN — GABAPENTIN 100 MILLIGRAM(S): 400 CAPSULE ORAL at 06:02

## 2021-06-08 RX ADMIN — GABAPENTIN 100 MILLIGRAM(S): 400 CAPSULE ORAL at 14:02

## 2021-06-08 RX ADMIN — ENOXAPARIN SODIUM 40 MILLIGRAM(S): 100 INJECTION SUBCUTANEOUS at 11:54

## 2021-06-08 RX ADMIN — Medication 1 APPLICATION(S): at 17:20

## 2021-06-08 RX ADMIN — CHLORHEXIDINE GLUCONATE 1 APPLICATION(S): 213 SOLUTION TOPICAL at 06:04

## 2021-06-08 RX ADMIN — SENNA PLUS 2 TABLET(S): 8.6 TABLET ORAL at 21:53

## 2021-06-08 RX ADMIN — Medication 100 MILLIGRAM(S): at 06:04

## 2021-06-08 RX ADMIN — Medication 650 MILLIGRAM(S): at 06:03

## 2021-06-08 RX ADMIN — Medication 1000 UNIT(S): at 11:57

## 2021-06-08 RX ADMIN — QUETIAPINE FUMARATE 50 MILLIGRAM(S): 200 TABLET, FILM COATED ORAL at 21:53

## 2021-06-08 RX ADMIN — Medication 1 TABLET(S): at 11:55

## 2021-06-08 RX ADMIN — Medication 650 MILLIGRAM(S): at 07:30

## 2021-06-08 RX ADMIN — PANTOPRAZOLE SODIUM 40 MILLIGRAM(S): 20 TABLET, DELAYED RELEASE ORAL at 11:57

## 2021-06-08 RX ADMIN — Medication 0.5 MILLIGRAM(S): at 17:20

## 2021-06-08 RX ADMIN — GABAPENTIN 100 MILLIGRAM(S): 400 CAPSULE ORAL at 21:53

## 2021-06-08 RX ADMIN — Medication 15 MILLIGRAM(S): at 06:04

## 2021-06-08 RX ADMIN — Medication 100 MILLIGRAM(S): at 17:21

## 2021-06-08 RX ADMIN — ALBUTEROL 2 PUFF(S): 90 AEROSOL, METERED ORAL at 09:10

## 2021-06-08 NOTE — PROGRESS NOTE ADULT - SUBJECTIVE AND OBJECTIVE BOX
Patient is a 55y old  Male who presents with a chief complaint of SOB (08 Jun 2021 09:14)      INTERVAL HPI/OVERNIGHT EVENTS:      VITAL SIGNS:  T(F): 97.1 (06-08-21 @ 05:45)  HR: 84 (06-08-21 @ 09:38)  BP: 136/80 (06-08-21 @ 05:45)  RR: 17 (06-08-21 @ 05:45)  SpO2: 100% (06-08-21 @ 09:38)  Wt(kg): --  I&O's Detail    07 Jun 2021 07:01  -  08 Jun 2021 07:00  --------------------------------------------------------  IN:  Total IN: 0 mL    OUT:    Voided (mL): 400 mL  Total OUT: 400 mL    Total NET: -400 mL        Mode: PS (Pressure Support)/ Spontaneous,PSV  FiO2: 40  PEEP: 5  PS: 15  MAP: 9        REVIEW OF SYSTEMS:    CONSTITUTIONAL:  No fevers, chills, sweats    HEENT:  Eyes:  No diplopia or blurred vision. ENT:  No earache, sore throat or runny nose.    CARDIOVASCULAR:  No pressure, squeezing, tightness, or heaviness about the chest; no palpitations.    RESPIRATORY:  Per HPI    GASTROINTESTINAL:  No abdominal pain, nausea, vomiting or diarrhea.    GENITOURINARY:  No dysuria, frequency or urgency.    NEUROLOGIC:  No paresthesias, fasciculations, seizures or weakness.    PSYCHIATRIC:  No disorder of thought or mood.      PHYSICAL EXAM:    Constitutional: Well developed and nourished  Eyes:Perrla  ENMT: normal  Neck:supple  Respiratory: good air entry  Cardiovascular: S1 S2 regular  Gastrointestinal: Soft, Non tender  Extremities: No edema  Vascular:normal  Neurological:Awake, alert,Ox3  Musculoskeletal:Normal      MEDICATIONS  (STANDING):  ascorbic acid 1000 milliGRAM(s) Oral daily  BACItracin   Ointment 1 Application(s) Topical two times a day  calcium carbonate 1250 mG  + Vitamin D (OsCal 500 + D) 1 Tablet(s) Oral daily  chlorhexidine 2% Cloths 1 Application(s) Topical <User Schedule>  cholecalciferol 1000 Unit(s) Oral daily  clonazePAM  Tablet 0.5 milliGRAM(s) Oral every 12 hours  enoxaparin Injectable 40 milliGRAM(s) SubCutaneous every 24 hours  gabapentin 100 milliGRAM(s) Oral every 8 hours  labetalol 100 milliGRAM(s) Enteral Tube two times a day  pantoprazole  Injectable 40 milliGRAM(s) IV Push daily  predniSONE   Tablet 15 milliGRAM(s) Oral daily  QUEtiapine 50 milliGRAM(s) Oral two times a day  senna 2 Tablet(s) Oral at bedtime  trimethoprim  40 mG/sulfamethoxazole 200 mG Suspension 160 milliGRAM(s) Oral <User Schedule>    MEDICATIONS  (PRN):  acetaminophen    Suspension .. 650 milliGRAM(s) Enteral Tube every 12 hours PRN Temp greater or equal to 38C (100.4F), Mild Pain (1 - 3)  ALBUTerol    90 MICROgram(s) HFA Inhaler 2 Puff(s) Inhalation every 6 hours PRN Shortness of Breath and/or Wheezing  artificial  tears Solution 1 Drop(s) Both EYES every 4 hours PRN Dry Eyes  bisacodyl Suppository 10 milliGRAM(s) Rectal daily PRN Constipation  ondansetron Injectable 4 milliGRAM(s) IV Push every 6 hours PRN Nausea and/or Vomiting  polyethylene glycol 3350 17 Gram(s) Oral daily PRN Constipation  sodium chloride 0.9% lock flush 10 milliLiter(s) IV Push every 1 hour PRN Pre/post blood products, medications, blood draw, and to maintain line patency      Allergies    No Known Allergies    Intolerances        LABS:                        10.3   8.55  )-----------( 241      ( 07 Jun 2021 11:03 )             31.9     06-07    136  |  99  |  27<H>  ----------------------------<  116<H>  3.9   |  29  |  0.80    Ca    9.0      07 Jun 2021 11:03  Phos  3.1     06-07  Mg     2.2     06-07    TPro  7.1  /  Alb  3.2<L>  /  TBili  0.4  /  DBili  x   /  AST  45<H>  /  ALT  94<H>  /  AlkPhos  91  06-07              CAPILLARY BLOOD GLUCOSE      POCT Blood Glucose.: 119 mg/dL (08 Jun 2021 08:17)  POCT Blood Glucose.: 127 mg/dL (08 Jun 2021 07:15)  POCT Blood Glucose.: 97 mg/dL (07 Jun 2021 23:27)  POCT Blood Glucose.: 105 mg/dL (07 Jun 2021 21:16)  POCT Blood Glucose.: 129 mg/dL (07 Jun 2021 16:50)        RADIOLOGY & ADDITIONAL TESTS:    CXR:    Ct scan chest:    ekg;    echo:

## 2021-06-08 NOTE — PROGRESS NOTE ADULT - PROBLEM SELECTOR PLAN 10
DVT and GI prophylaxis.  Will need vent facility.  SW aware. List given to family.  Continue daily SBT  Overall prognosis is guarded. DVT and GI prophylaxis.  Will need vent facility.  SW aware. Awaiting authorization for [facility]    Continue daily SBT  Overall prognosis is guarded.

## 2021-06-08 NOTE — CHART NOTE - NSCHARTNOTEFT_GEN_A_CORE
Contacted and spoke with Alec [phone]. [place] Speaking ID # [id number]. Explained current condition. Encouraged family to address concerns and emotional support given, all concerns and questions addressed.

## 2021-06-08 NOTE — BH CONSULTATION LIAISON PROGRESS NOTE - NSBHMSEMUSCLE_PSY_A_CORE
Normal muscle tone/strength
Unable to assess
Normal muscle tone/strength
Unable to assess
Normal muscle tone/strength

## 2021-06-08 NOTE — BH CONSULTATION LIAISON PROGRESS NOTE - NSBHFUPINTERVALHXFT_PSY_A_CORE
Per chart and staff reports, no changes in pt's medication regimen were made yesterday. Pt seen in his room for f/u, encountered awake and making EC with MD, but aphonic s/p trach. [email or web address] #[id number] is provided, but pt does not follow any commands. Outside room, MD speaks to ICU team re pt's medications. 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 MDs agree that today Klonopin dose will be lowered to 2 mg q8h, with plan to reassess tomorrow.  
Per chart and staff reports, on the afternoon of 5/26 pt again developed ileus which caused abdominal discomfort and vomiting. All PO medications (including Klonopin and Seroquel) have been held due to pt inability to tolerate PO even via PEG. Pt was given 1 dose of Movantik in effort to relieve the ileus, but so far without success. Pt seen in his room for f/u, encountered sitting up in bed awake and alert s/p trach, appearing uncomfortable. Pt interviewed with [employer] #[id number]; he is aphonic s/p trach, but makes the hand gesture for "so-so" when asked how he is feeling. Pt squeezes MD's hand for "yes" when asked if he is in pain and if he is having trouble sleeping. Pt shakes his head "no" when asked about SI.  
Pt is seen today for f/u on 4N, encountered sitting up in bed awake and alert s/p trach and appearing euthymic. Pt is interviewed with [employer] video #[id number], but as pt is aphonic 2/2 trach, he communicates with head nods, gestures and writing on MD's clipboard in Chinese (translated by ). When asked how he is feeling, pt writes, "A little bit tired." Pt confirms that his breathing is "good" and that he is tolerating longer SBT trials. Pt describes his anxiety level as "all right."  
Per chart and staff reports, pt was maintained yesterday on Klonopin 1 mg q8h and Seroquel 50 mg BID without need for PRNs. Per staff, methadone is being tapered to 5 mg q12h starting today. Pt seen in his room for f/u, encountered sitting up in bed awake and alert s/p trach, with no IV medications running. Pt interviewed with [employer] #[id number], but is aphonic s/p trach. Pt does make good EC with MD, squeezes MD's hand twice for "yes" when asked if he is comfortable physically and sleeping well, and shakes his head "no" when asked about SI.  
Per chart and staff reports, pt's ileus resolved over the weekend and he was transferred from ICU to  on 5/31. Pt is seen today for f/u on 4N, encountered sitting up in bed awake and alert s/p trach and appearing euthymic. Pt is interviewed with [employer] video #[id number], but as pt is aphonic 2/2 trach, he communicates with head nods, gestures and writing on MD's clipboard in Moldovan (translated by ). When asked how he is feeling, pt writes, "I feel short of breath." Pt shakes his head "Yes" when asked if he is happy, and "No" when asked if he is sad. MD asks about SI, and pt writes, "I am not thinking about death any more. I want to live."     
Per 4N staff, pt has been accepted at a vent SNF ([facility]) and is expected to leave today. Pt is seen in his room for f/u, encountered sitting up in bed awake and alert s/p trach and appearing euthymic. Pt is interviewed with [employer] video #[id number], but as pt is aphonic 2/2 trach, he communicates with head nods, gestures and writing in notebook in Turkish (translated by ). When asked how he is feeling, pt makes hand gesture for "So-so." When asked about his breathing, pt writes, "I can't breathe well." MD validates pt's concern and explains that he has been accepted to a [facility] where staff will work further on improving his breathing with long-term goal of weaning him off the trach. Pt writes "Good" on pad and gives the thumbs-up sign.  
Per chart and staff reports, pt has waxing/waning LOC but has generally been more awake and alert. Pt seen in his room for f/u, encountered sleeping deeply and unarousable. Pt does not open eyes, make any sounds or follow any commands. Outside room, MD speaks to ICU team re pt's medications. Team reports plan to further taper Klonopin today to 1 mg q8h standing, while tapering methadone dose as well.  
Per chart and staff reports, pt was maintained over w/e on Klonopin 1 mg q8h and Seroquel 75 mg BID without need for PRNs. Per staff, methadone is being tapered to 5 mg q8h starting today. Pt seen in his room for f/u, encountered sitting up in bed awake and alert s/p trach, with no IV medications running. Pt interviewed with [employer] #[id number], but is aphonic s/p trach. Pt does make good EC with MD, squeezes MD's hand twice for "yes" when asked if he is comfortable physically and sleeping well, and shakes his head "no" when asked about SI.

## 2021-06-08 NOTE — PROGRESS NOTE ADULT - PROBLEM SELECTOR PLAN 1
Wean ventilator support as tolerated - FiO2 @ 40%.   SBT daily  Continue to wean as tolerated.   Tracheal care  Decubitus prevention  Supplemental nutrition  Monitor oxygen sat  Vit C, D and zinc supp  Montelukast 10 mgs po Qhs  Daily CXR   G-tube with feeds  Replace lytes  Pulmonary toilet  Will need D/C to vent facility.   DVT and GI PPX.

## 2021-06-08 NOTE — BH CONSULTATION LIAISON PROGRESS NOTE - NSBHADMITIPOBS_PSY_A_CORE
Enhanced supervision
Enhanced supervision
Routine observation
Routine observation
Enhanced supervision
Routine observation
Enhanced supervision
Enhanced supervision

## 2021-06-08 NOTE — BH CONSULTATION LIAISON PROGRESS NOTE - NSBHMSEAFFRANGE_PSY_A_CORE
Unable to assess
Constricted
Constricted/Unable to assess
Constricted
Constricted/Unable to assess
Unable to assess

## 2021-06-08 NOTE — PROGRESS NOTE ADULT - SUBJECTIVE AND OBJECTIVE BOX
ARANZA CHAMBERS    SCU progress note    INTERVAL HPI/OVERNIGHT EVENTS: *** Patient seen and examined at bedside. Patient comfortable in bed sleeping. No overnight events    DNR [ ]   DNI  [  ]    Covid - 19 PCR: Negative 6/6    The 4Ms    What Matters Most: see GOC  Age appropriate Medications/Screen for High Risk Medication: Yes  Mentation: see CAM below  Mobility: defer to physical exam    The Confusion Assessment Method (CAM) Diagnostic Algorithm     1: Acute Onset or Fluctuating Course  - Is there evidence of an acute change in mental status from the patient’s baseline? Did the (abnormal) behavior  fluctuate during the day, that is, tend to come and go, or increase and decrease in severity?  [ ] YES [ x] NO     2: Inattention  - Did the patient have difficulty focusing attention, being easily distractible, or having difficulty keeping track of what was being said?  [ ] YES [x ] NO     3: Disorganized thinking  -Was the patient’s thinking disorganized or incoherent, such as rambling or irrelevant conversation, unclear or illogical flow of ideas, or unpredictable switching from subject to subject?  [ ] YES [x ] NO    4: Altered Level of consciousness?  [ ] YES [x ] NO    The diagnosis of delirium by CAM requires the presence of features 1 and 2 and either 3 or 4.    PRESSORS: [ ] YES [ x] NO  trimethoprim  40 mG/sulfamethoxazole 200 mG Suspension 160 milliGRAM(s) Oral <User Schedule>    Cardiovascular:  Heart Failure  Acute   Acute on Chronic  Chronic       labetalol 100 milliGRAM(s) Enteral Tube two times a day    Pulmonary:  ALBUTerol    90 MICROgram(s) HFA Inhaler 2 Puff(s) Inhalation every 6 hours PRN    Hematalogic:  enoxaparin Injectable 40 milliGRAM(s) SubCutaneous every 24 hours    Other:  acetaminophen    Suspension .. 650 milliGRAM(s) Enteral Tube every 12 hours PRN  artificial  tears Solution 1 Drop(s) Both EYES every 4 hours PRN  ascorbic acid 1000 milliGRAM(s) Oral daily  BACItracin   Ointment 1 Application(s) Topical two times a day  bisacodyl Suppository 10 milliGRAM(s) Rectal daily PRN  calcium carbonate 1250 mG  + Vitamin D (OsCal 500 + D) 1 Tablet(s) Oral daily  chlorhexidine 2% Cloths 1 Application(s) Topical <User Schedule>  cholecalciferol 1000 Unit(s) Oral daily  clonazePAM  Tablet 0.5 milliGRAM(s) Oral every 12 hours  gabapentin 100 milliGRAM(s) Oral every 8 hours  naloxegol 12.5 milliGRAM(s) Oral daily  ondansetron Injectable 4 milliGRAM(s) IV Push every 6 hours PRN  pantoprazole  Injectable 40 milliGRAM(s) IV Push daily  polyethylene glycol 3350 17 Gram(s) Oral daily PRN  predniSONE   Tablet 15 milliGRAM(s) Oral daily  QUEtiapine 50 milliGRAM(s) Oral two times a day  senna 2 Tablet(s) Oral at bedtime  sodium chloride 0.9% lock flush 10 milliLiter(s) IV Push every 1 hour PRN    acetaminophen    Suspension .. 650 milliGRAM(s) Enteral Tube every 12 hours PRN  ALBUTerol    90 MICROgram(s) HFA Inhaler 2 Puff(s) Inhalation every 6 hours PRN  artificial  tears Solution 1 Drop(s) Both EYES every 4 hours PRN  ascorbic acid 1000 milliGRAM(s) Oral daily  BACItracin   Ointment 1 Application(s) Topical two times a day  bisacodyl Suppository 10 milliGRAM(s) Rectal daily PRN  calcium carbonate 1250 mG  + Vitamin D (OsCal 500 + D) 1 Tablet(s) Oral daily  chlorhexidine 2% Cloths 1 Application(s) Topical <User Schedule>  cholecalciferol 1000 Unit(s) Oral daily  clonazePAM  Tablet 0.5 milliGRAM(s) Oral every 12 hours  enoxaparin Injectable 40 milliGRAM(s) SubCutaneous every 24 hours  gabapentin 100 milliGRAM(s) Oral every 8 hours  labetalol 100 milliGRAM(s) Enteral Tube two times a day  naloxegol 12.5 milliGRAM(s) Oral daily  ondansetron Injectable 4 milliGRAM(s) IV Push every 6 hours PRN  pantoprazole  Injectable 40 milliGRAM(s) IV Push daily  polyethylene glycol 3350 17 Gram(s) Oral daily PRN  predniSONE   Tablet 15 milliGRAM(s) Oral daily  QUEtiapine 50 milliGRAM(s) Oral two times a day  senna 2 Tablet(s) Oral at bedtime  sodium chloride 0.9% lock flush 10 milliLiter(s) IV Push every 1 hour PRN  trimethoprim  40 mG/sulfamethoxazole 200 mG Suspension 160 milliGRAM(s) Oral <User Schedule>    Drug Dosing Weight  Height (cm): 167.6 (23 Apr 2021 23:15)  Weight (kg): 83.9 (23 Apr 2021 23:15)  BMI (kg/m2): 29.9 (23 Apr 2021 23:15)  BSA (m2): 1.93 (23 Apr 2021 23:15)    CENTRAL LINE: [ ] YES [ x] NO  LOCATION:   DATE INSERTED:  REMOVE: [ ] YES [ ] NO  EXPLAIN:    RIVERA: [ ] YES [x ] NO    DATE INSERTED:  REMOVE:  [ ] YES [ ] NO  EXPLAIN:    PAST MEDICAL & SURGICAL HISTORY:  No pertinent past medical history    S/P moody  1990s    S/P appendectomy  1990s 06-07 @ 07:01  -  06-08 @ 07:00  --------------------------------------------------------  IN: 0 mL / OUT: 400 mL / NET: -400 mL        Mode: AC/ CMV (Assist Control/ Continuous Mandatory Ventilation)  RR (machine): 18  TV (machine): 450  FiO2: 40  PEEP: 5  ITime: 1  MAP: 11  PIP: 24      PHYSICAL EXAM:    GENERAL: NAD, well-groomed, well-developed  HEAD:  Atraumatic, Normocephalic  EYES: EOMI, PERRLA, conjunctiva and sclera clear  ENMT: No tonsillar erythema, exudates, or enlargement; Moist mucous membranes, Good dentition, No lesions  NECK: Tracheostomy Shiley 6, Supple, No JVD, Normal thyroid  NERVOUS SYSTEM:  Alert & Oriented X3, Good concentration; Motor Strength 5/5 B/L upper and lower extremities; DTRs 2+ intact and symmetric  CHEST/LUNG: Clear to percussion bilaterally; No rales, rhonchi, wheezing, or rubs  HEART: Regular rate and rhythm; No murmurs, rubs, or gallops  ABDOMEN: Soft, Nontender, Nondistended; Bowel sounds present  EXTREMITIES:  2+ Peripheral Pulses, No clubbing, cyanosis, or edema  LYMPH: No lymphadenopathy noted  SKIN: No rashes or lesions      LABS:  CBC Full  -  ( 07 Jun 2021 11:03 )  WBC Count : 8.55 K/uL  RBC Count : 3.20 M/uL  Hemoglobin : 10.3 g/dL  Hematocrit : 31.9 %  Platelet Count - Automated : 241 K/uL  Mean Cell Volume : 99.7 fl  Mean Cell Hemoglobin : 32.2 pg  Mean Cell Hemoglobin Concentration : 32.3 gm/dL  Auto Neutrophil # : 7.05 K/uL  Auto Lymphocyte # : 0.98 K/uL  Auto Monocyte # : 0.38 K/uL  Auto Eosinophil # : 0.03 K/uL  Auto Basophil # : 0.02 K/uL  Auto Neutrophil % : 82.4 %  Auto Lymphocyte % : 11.5 %  Auto Monocyte % : 4.4 %  Auto Eosinophil % : 0.4 %  Auto Basophil % : 0.2 %    06-07    136  |  99  |  27<H>  ----------------------------<  116<H>  3.9   |  29  |  0.80    Ca    9.0      07 Jun 2021 11:03  Phos  3.1     06-07  Mg     2.2     06-07    TPro  7.1  /  Alb  3.2<L>  /  TBili  0.4  /  DBili  x   /  AST  45<H>  /  ALT  94<H>  /  AlkPhos  91  06-07              [ x ]  DVT Prophylaxis- Lovenox  [ x ]  PEG Nutrition, Brand, Rate  Diet Prescription: Diet, NPO with Tube Feed:   Tube Feeding Modality: Nasogastric  Vital AF 1.2 BARRIE  Total Volume for 24 Hours (mL): 1200  Continuous  Starting Tube Feed Rate {mL per Hour}: 30  Increase Tube Feed Rate by (mL): 10     Every 8 hour  Until Goal Tube Feed Rate (mL per Hour): 50  Tube Feed Duration (in Hours): 24  Tube Feed Start Time: 02:00              Abnormal Nutritional Status -  Malnutrition   Cachexia         RADIOLOGY & ADDITIONAL STUDIES:  ***  < from: Xray Chest 1 View- PORTABLE-Routine (Xray Chest 1 View- PORTABLE-Routine in AM.) (06.04.21 @ 09:37) >    EXAM:  XR CHEST PORTABLE ROUTINE 1V                            PROCEDURE DATE:  06/04/2021          INTERPRETATION:  Chest one view    HISTORY: Shortness of breath    COMPARISON STUDY: 6/1/2021    Frontal expiratory view of the chest shows the heart to be similar in size. Tracheostomy tube is again noted.    The lungs show slight clearing of patchy left infiltrates and there is no evidence of pneumothorax nor pleural effusion.    IMPRESSION:  Partial clearing, left lung.    < end of copied text >    Goals of Care Discussion with Family/Proxy/Other   -  see note from 4/14

## 2021-06-08 NOTE — BH CONSULTATION LIAISON PROGRESS NOTE - NSCDBILL_PSY_A_CORE
[id number] - Inpatient, moderate complexity
[id number] - Inpatient, moderate complexity
[id number] - Inpatient, moderate complexity
[id number] - Inpatient, moderate complexity
[id number] - Inpatient, moderate complexity
[id number] - Inpatient, moderate complexity
[id number] - Inpatient, moderate complexity
[id number] - Inpatient, moderate complexity

## 2021-06-08 NOTE — BH CONSULTATION LIAISON PROGRESS NOTE - NSBHCHARTREVIEWVS_PSY_A_CORE FT
Vital Signs Last 24 Hrs  T(C): 36.6 (07 Jun 2021 12:31), Max: 36.9 (06 Jun 2021 21:25)  T(F): 97.8 (07 Jun 2021 12:31), Max: 98.4 (06 Jun 2021 21:25)  HR: 87 (07 Jun 2021 12:31) (77 - 101)  BP: 135/84 (07 Jun 2021 12:31) (135/84 - 148/82)  BP(mean): --  RR: 22 (07 Jun 2021 12:31) (20 - 22)  SpO2: 100% (07 Jun 2021 12:31) (97% - 100%)
Vital Signs Last 24 Hrs  T(C): 36.7 (08 Jun 2021 12:56), Max: 36.7 (07 Jun 2021 20:21)  T(F): 98.1 (08 Jun 2021 12:56), Max: 98.1 (08 Jun 2021 12:56)  HR: 88 (08 Jun 2021 12:56) (74 - 88)  BP: 136/77 (08 Jun 2021 12:56) (136/77 - 146/87)  BP(mean): --  RR: 17 (08 Jun 2021 12:56) (17 - 22)  SpO2: 100% (08 Jun 2021 12:56) (99% - 100%)
Vital Signs Last 24 Hrs  T(C): 36.9 (01 Jun 2021 14:45), Max: 37.3 (01 Jun 2021 05:00)  T(F): 98.4 (01 Jun 2021 14:45), Max: 99.1 (01 Jun 2021 05:00)  HR: 64 (01 Jun 2021 14:45) (64 - 77)  BP: 135/76 (01 Jun 2021 14:45) (117/66 - 135/76)  BP(mean): --  RR: 20 (01 Jun 2021 14:45) (20 - 23)  SpO2: 100% (01 Jun 2021 14:45) (100% - 100%)
Vital Signs Last 24 Hrs  T(C): 37.1 (27 May 2021 12:00), Max: 37.8 (26 May 2021 23:00)  T(F): 98.8 (27 May 2021 12:00), Max: 100.1 (26 May 2021 23:00)  HR: 96 (27 May 2021 15:00) (75 - 133)  BP: 146/76 (27 May 2021 15:00) (108/82 - 180/89)  BP(mean): 94 (27 May 2021 15:00) (83 - 113)  RR: 25 (27 May 2021 15:00) (16 - 37)  SpO2: 100% (27 May 2021 15:00) (88% - 100%)
Vital Signs Last 24 Hrs  T(C): 36.1 (21 May 2021 12:00), Max: 37.4 (20 May 2021 20:30)  T(F): 97 (21 May 2021 12:00), Max: 99.3 (20 May 2021 20:30)  HR: 109 (21 May 2021 13:00) (56 - 111)  BP: 129/61 (21 May 2021 13:00) (93/44 - 151/87)  BP(mean): 75 (21 May 2021 13:00) (56 - 105)  RR: 12 (21 May 2021 13:00) (12 - 33)  SpO2: 96% (21 May 2021 13:00) (64% - 100%)
Vital Signs Last 24 Hrs  T(C): 36.9 (20 May 2021 07:00), Max: 37.1 (19 May 2021 17:21)  T(F): 98.5 (20 May 2021 07:00), Max: 98.8 (19 May 2021 17:21)  HR: 63 (20 May 2021 13:00) (44 - 125)  BP: 116/64 (20 May 2021 13:00) (74/37 - 156/69)  BP(mean): 74 (20 May 2021 13:00) (46 - 101)  RR: 32 (20 May 2021 13:00) (13 - 36)  SpO2: 100% (20 May 2021 13:00) (90% - 100%)
Vital Signs Last 24 Hrs  T(C): 36.6 (24 May 2021 07:00), Max: 37.1 (23 May 2021 16:13)  T(F): 97.8 (24 May 2021 07:00), Max: 98.8 (23 May 2021 16:13)  HR: 103 (24 May 2021 13:00) (57 - 103)  BP: 168/81 (24 May 2021 13:00) (111/58 - 168/81)  BP(mean): 105 (24 May 2021 13:00) (66 - 105)  RR: 31 (24 May 2021 13:00) (15 - 31)  SpO2: 97% (24 May 2021 13:00) (95% - 100%)
Vital Signs Last 24 Hrs  T(C): 36.8 (24 May 2021 23:31), Max: 37.3 (24 May 2021 12:00)  T(F): 98.3 (24 May 2021 23:31), Max: 99.1 (24 May 2021 12:00)  HR: 82 (25 May 2021 09:00) (59 - 103)  BP: 143/74 (25 May 2021 08:00) (110/64 - 168/81)  BP(mean): 90 (25 May 2021 08:00) (75 - 105)  RR: 20 (25 May 2021 08:00) (20 - 31)  SpO2: 99% (25 May 2021 09:00) (97% - 100%)

## 2021-06-08 NOTE — PROGRESS NOTE ADULT - ASSESSMENT
55M, no PMH, PSH appy and moody 1990s presented 3/14 with x9 days worsening cough, subjective fevers, and SOB, with x2-3 days dysuria and central, non-radiating, constant CP, admitted to [facility] for management of COVID PNA. Given discomfort of breathing, placed on 4L NC, with improvement. Labs notable for lymphopenia and neutrophilia despite WBC wnl, d-dimer 423, AST//114, lactate 3.3, and . Trop negative x1. +COVID. CXR notable for b/l infiltrates, L>R. Given x1 dose Tylenol and 1L IVF bolus in ED. Initially started on Remdesivir and Dexamethasone, Remdesivir was later discontinued secondary to positive antibodies and elevated LFT's. Not a  candidate for Tociluzimab due to elevated LFT. Patient continued to have respiratory distress requiring 15 L NRB support.    3/19/2021- Patient got transferred to ICU  due to increased work of breathing despite being on 15 L NRB . Patient was on HFNC to keep saturation >90%. Chest X ray showed Grossly stable mild left apical pneumothorax. Stable small pneumomediastinum. Soft tissue emphysema at the neck bases again noted. Stable patchy bilateral pulmonary infiltrates. Thoracic surgery consulted, no intervention at this time.  3/22/2021 - Chest X ray revealed trace ptx right and mild left pneumothorax  3/24/2021 - Overnight pt saturation remains in early 90s and late 80s . Pt remains on HFNC. CXR stable  3/25/2021- Chest X ray showed Overlying chin obscures lung apices. Questionable tiny left apical pneumothorax. Trial of semi- pulse steroid .. solumedrol 250 bid x 3 days   3/28- Started on solumedrol 40 mg bid  3/29/2021 - At 5.30 am, pt was in severe respiratory distress with tachypneic in 40s, tachycardic in 140s, saturating at high 70s, so decision was made to sedate and intubate the pt. Anesthesiologist intubated the patient. A central line, NGT  was placed  4/1/2021- Palliative care consulted. patient's brother/Idalgo ([phone]) has agreed to act as surrogate. Prednisone tapered to  once daily   4/6/2021- Left TLC placed, Hypertensive - s/p Lopressor 5 mg X 1  4/8/2021- Lasix daily and HCTz 50 mg added to aim for negative fluid balance, Left Chest tube placed, added Albumin (4/8-4/10)  4/11/2021-patient was having fever 100.8 rectal, s/p Tylenol , s/p 1 dose of vancomycin, on zosyn - New infiltrate on CXR ,likely developing VAP  4/13/2021- Continue to spike fevers, Zosyn switched to Meropenem. ID- Dr Anand following, new TLC placed  4/15/2021- Chest tuber discontinued   4/18/2021- BP running on lower side, Started on phenylephrine, Ibuprofen D/Connor , FiO2 increased to 70%Patient had large pneumothorax note don Left, thoracic surgery was consulted ,  left chest tube placed.  4/20/2021- Patient received 1 dose of Epifanio for vent asynchrony, A line placed, vent settings changed fro mPC to CVVC     4/21/2021- Trach placed  4/23/2021- PEG tube placed  4/25/2021 - cxr 4/25/21 with A 40% LEFT pneumothorax. LEFT multi-sidehole pigtail catheter overlies LEFT lower hemithorax.. Bilateral multifocal and diffuse ill-defined airspace opacities..  Follow-up AP portable chest radiograph 4/25/2021 AT 8:58 AM: Residual LEFT 30% upper zone pneumothorax.  4/26/2021- Patient noted to have hemoglobin drop to 6.8 from 7.5, will transfuse one unit of PRBC and monitor CBC.   4/27/2021- Patient's hemoglobin dropped again to 7, will give two unit of PRBC  4/28/2021- On 4/28 Hb 6.8, s/p 1 unit of PRBC hb 7.5 now. 4 PRBC total  CTH and CT abdomen w/o contrast no evidence of bleed  No active signs of bleeding (No hematemesis, melena or hematuria)  F/u hemolysis w/u- negative so far, elevated reticulocytes . Dr Andrade consulted  5/2/2021-Patients Hb remained stable, was tachypneic and breathing over the vent so 1 mg push of Dilaudid X 2 was given. V: 20/400/60%/3+ on precedex drip at 1.5.   5/3/2021- patient was found to have large gastric bubble, G tube was connected to wall suction, repeat CXR showed improvement.  5/5/2021 - Hemoglobin dropped from 8.8 to 7.4.Will add bactrim empirically as patient is on chronic prednisone for organizing pneumonia. Tube feeds were held because of diarrhea  5/6/2021- CT chest was done, which showed mild PTX. Left 3rd intercostal space Chest tube was placed. Patient was hypotensive overnight. Cristiano temperature. Was septic, will send cultures if spikes temperature again. Patient was retaining urine, andrea was placed overnight. Will start on albumin and give one dose of lasix as patient was hypotensive and edematous in upper extremities, also had low albumin. Patient was getting agitated, will give ketamine IV push and monitor if it helps.  5/10/2021- Pt tachycardic overnight and hypertensive to 228/116 w/p 1x lopressor. Stopped levophed. Pt became hypotensive. Then started phenylephrine. BP now stable. Will d/c methadone and seroquel and continue on precedex for sedation. CXR showing apical pneumothorax   5/13/2021- Pt tachycardic to 160-170 yesterday afternoon and night. Pupils dilated. Did not respond to increased doses of opiates and benzos. Developed fever to 102.3 overnight with episode of vomiting. UA was positive. started empirically on vanc/zosyn - will d/c for now as no clear source of infection. AM abdominal xray w/ likely ileus. Will restart Free water. Hold Lasix. CT abdomen done showing ileus and possible fecal matter in rectum. Fecal disimpaction performed, small ball of stool retrieved with liquid stool excreted subsequently. Spoke to Surgery RUBI Wood. Tube feeds stopped and PEG tube placed to suction with no output.. Also will restart Precedex as likely in withdrawal and clonid  5/18/2021- Patient was tachy and agitated. Later he had hypotension and bradycardia. He was given 1L bolus and was restarted on pheny after which he improved  5/25- Behavioral health consulted due to difficultyn weaning off sedation . Recommended C/w Klonopin to 1 mg q8h standing (no PRNs), with plan to further taper as tolerated. C/w Seroquel 50 mg BID standing  5/26/2021- BP was elevated, was started on labetalol. d/c andrea.  CT placed on water seal, c/w water seal  5/27/2021- Vomiting X2, held feeding, CT Abdomen/Pelvis ordered  5/28/2021- Placed on trach collar today  5/30/2021- Patient was on trach collar throughout the day yesterday and placed back on vent overnight. No other acute events overnight.    5/31/2021- Decreased Methadone to 5 mg daily and Klonopin to 0.5 mg every 8 hours. Plan was made to transfer patient to SCU  6/1 Abd discomfort. AXR results as above, no bowel obstruction. Resume PEG feedings.   6/1 TC trial. Pt tolerated only 10 minutes, RR ~30's and anxious.  Will attempt SBT with PS.   6/3  Only tolerated 10 minutes of BSBT with PS 5. Became diaphoretic and c/o SOB   6/5  Tolerated 9 hours SBT yesterday and 2.5 hours today with PS 15     55M, no PMH, PSH appy and moody 1990s presented 3/14 with x9 days worsening cough, subjective fevers, and SOB, with x2-3 days dysuria and central, non-radiating, constant CP, admitted to [facility] for management of COVID PNA. Given discomfort of breathing, placed on 4L NC, with improvement. Labs notable for lymphopenia and neutrophilia despite WBC wnl, d-dimer 423, AST//114, lactate 3.3, and . Trop negative x1. +COVID. CXR notable for b/l infiltrates, L>R. Given x1 dose Tylenol and 1L IVF bolus in ED. Initially started on Remdesivir and Dexamethasone, Remdesivir was later discontinued secondary to positive antibodies and elevated LFT's. Not a  candidate for Tociluzimab due to elevated LFT. Patient continued to have respiratory distress requiring 15 L NRB support.    3/19/2021- Patient got transferred to ICU  due to increased work of breathing despite being on 15 L NRB . Patient was on HFNC to keep saturation >90%. Chest X ray showed Grossly stable mild left apical pneumothorax. Stable small pneumomediastinum. Soft tissue emphysema at the neck bases again noted. Stable patchy bilateral pulmonary infiltrates. Thoracic surgery consulted, no intervention at this time.  3/22/2021 - Chest X ray revealed trace ptx right and mild left pneumothorax  3/24/2021 - Overnight pt saturation remains in early 90s and late 80s . Pt remains on HFNC. CXR stable  3/25/2021- Chest X ray showed Overlying chin obscures lung apices. Questionable tiny left apical pneumothorax. Trial of semi- pulse steroid .. solumedrol 250 bid x 3 days   3/28- Started on solumedrol 40 mg bid  3/29/2021 - At 5.30 am, pt was in severe respiratory distress with tachypneic in 40s, tachycardic in 140s, saturating at high 70s, so decision was made to sedate and intubate the pt. Anesthesiologist intubated the patient. A central line, NGT  was placed  4/1/2021- Palliative care consulted. patient's brother/Idalgo ([phone]) has agreed to act as surrogate. Prednisone tapered to  once daily   4/6/2021- Left TLC placed, Hypertensive - s/p Lopressor 5 mg X 1  4/8/2021- Lasix daily and HCTz 50 mg added to aim for negative fluid balance, Left Chest tube placed, added Albumin (4/8-4/10)  4/11/2021-patient was having fever 100.8 rectal, s/p Tylenol , s/p 1 dose of vancomycin, on zosyn - New infiltrate on CXR ,likely developing VAP  4/13/2021- Continue to spike fevers, Zosyn switched to Meropenem. ID- Dr Anand following, new TLC placed  4/15/2021- Chest tuber discontinued   4/18/2021- BP running on lower side, Started on phenylephrine, Ibuprofen D/Connor , FiO2 increased to 70%Patient had large pneumothorax note don Left, thoracic surgery was consulted ,  left chest tube placed.  4/20/2021- Patient received 1 dose of Epifanio for vent asynchrony, A line placed, vent settings changed fro mPC to CVVC     4/21/2021- Trach placed  4/23/2021- PEG tube placed  4/25/2021 - cxr 4/25/21 with A 40% LEFT pneumothorax. LEFT multi-sidehole pigtail catheter overlies LEFT lower hemithorax.. Bilateral multifocal and diffuse ill-defined airspace opacities..  Follow-up AP portable chest radiograph 4/25/2021 AT 8:58 AM: Residual LEFT 30% upper zone pneumothorax.  4/26/2021- Patient noted to have hemoglobin drop to 6.8 from 7.5, will transfuse one unit of PRBC and monitor CBC.   4/27/2021- Patient's hemoglobin dropped again to 7, will give two unit of PRBC  4/28/2021- On 4/28 Hb 6.8, s/p 1 unit of PRBC hb 7.5 now. 4 PRBC total  CTH and CT abdomen w/o contrast no evidence of bleed  No active signs of bleeding (No hematemesis, melena or hematuria)  F/u hemolysis w/u- negative so far, elevated reticulocytes . Dr Andrade consulted  5/2/2021-Patients Hb remained stable, was tachypneic and breathing over the vent so 1 mg push of Dilaudid X 2 was given. V: 20/400/60%/3+ on precedex drip at 1.5.   5/3/2021- patient was found to have large gastric bubble, G tube was connected to wall suction, repeat CXR showed improvement.  5/5/2021 - Hemoglobin dropped from 8.8 to 7.4.Will add bactrim empirically as patient is on chronic prednisone for organizing pneumonia. Tube feeds were held because of diarrhea  5/6/2021- CT chest was done, which showed mild PTX. Left 3rd intercostal space Chest tube was placed. Patient was hypotensive overnight. Cristiano temperature. Was septic, will send cultures if spikes temperature again. Patient was retaining urine, andrea was placed overnight. Will start on albumin and give one dose of lasix as patient was hypotensive and edematous in upper extremities, also had low albumin. Patient was getting agitated, will give ketamine IV push and monitor if it helps.  5/10/2021- Pt tachycardic overnight and hypertensive to 228/116 w/p 1x lopressor. Stopped levophed. Pt became hypotensive. Then started phenylephrine. BP now stable. Will d/c methadone and seroquel and continue on precedex for sedation. CXR showing apical pneumothorax   5/13/2021- Pt tachycardic to 160-170 yesterday afternoon and night. Pupils dilated. Did not respond to increased doses of opiates and benzos. Developed fever to 102.3 overnight with episode of vomiting. UA was positive. started empirically on vanc/zosyn - will d/c for now as no clear source of infection. AM abdominal xray w/ likely ileus. Will restart Free water. Hold Lasix. CT abdomen done showing ileus and possible fecal matter in rectum. Fecal disimpaction performed, small ball of stool retrieved with liquid stool excreted subsequently. Spoke to Surgery RUBI Wood. Tube feeds stopped and PEG tube placed to suction with no output.. Also will restart Precedex as likely in withdrawal and clonid  5/18/2021- Patient was tachy and agitated. Later he had hypotension and bradycardia. He was given 1L bolus and was restarted on pheny after which he improved  5/25- Behavioral health consulted due to difficultyn weaning off sedation . Recommended C/w Klonopin to 1 mg q8h standing (no PRNs), with plan to further taper as tolerated. C/w Seroquel 50 mg BID standing  5/26/2021- BP was elevated, was started on labetalol. d/c andrea.  CT placed on water seal, c/w water seal  5/27/2021- Vomiting X2, held feeding, CT Abdomen/Pelvis ordered  5/28/2021- Placed on trach collar today  5/30/2021- Patient was on trach collar throughout the day yesterday and placed back on vent overnight. No other acute events overnight.    5/31/2021- Decreased Methadone to 5 mg daily and Klonopin to 0.5 mg every 8 hours. Plan was made to transfer patient to SCU  6/1 Abd discomfort. AXR results as above, no bowel obstruction. Resume PEG feedings.   6/1 TC trial. Pt tolerated only 10 minutes, RR ~30's and anxious.  Will attempt SBT with PS.   6/3  Only tolerated 10 minutes of BSBT with PS 5. Became diaphoretic and c/o SOB   6/5  Tolerated 9 hours SBT yesterday and 2.5 hours today with PS 15  6/8 Tolerated 9 hrs of SBT PS 15 today

## 2021-06-08 NOTE — PROGRESS NOTE ADULT - ATTENDING COMMENTS
Discharge planning pending placement  Cont. daily weaning trials  Physical therapy follow up   D/c movantik as off opioids now  Cont. Clonazepam and Seroquel for now

## 2021-06-08 NOTE — BH CONSULTATION LIAISON PROGRESS NOTE - NSICDXBHSECONDARYDX_PSY_ALL_CORE
Opioid withdrawal   F11.23  Acute encephalopathy   G93.40  Mixed origin delirium   F05  

## 2021-06-08 NOTE — BH CONSULTATION LIAISON PROGRESS NOTE - NSBHMSEAFFCONG_PSY_A_CORE
Congruent
Unable to assess
Unable to assess
Congruent
Unable to assess
Unable to assess

## 2021-06-08 NOTE — BH CONSULTATION LIAISON PROGRESS NOTE - NSBHFUPREASONCONS_PSY_A_CORE
med management

## 2021-06-08 NOTE — BH CONSULTATION LIAISON PROGRESS NOTE - DETAILS
Unable to obtain (pt aphonic)
Unable to obtain (pt aphonic)
Fatigue
Unable to obtain (pt aphonic)
SOB
Can't breathe well
Unable to obtain (pt aphonic)
Unable to obtain (pt aphonic)

## 2021-06-08 NOTE — PROGRESS NOTE ADULT - SUBJECTIVE AND OBJECTIVE BOX
Patient is a 55y old  Male who presents with a chief complaint of SOB (07 Jun 2021 11:33)    pt seen in icu [  ], reg med floor scu [ x  ], bed [ x ], chair at bedside [   ], awake and responsive [ x ], mildly sedated, [  ],    nad [x  ]      Allergies    No Known Allergies        Vitals    T(F): 97.1 (06-08-21 @ 05:45), Max: 98 (06-07-21 @ 06:27)  HR: 82 (06-08-21 @ 05:45) (74 - 101)  BP: 136/80 (06-08-21 @ 05:45) (135/84 - 148/82)  RR: 17 (06-08-21 @ 05:45) (17 - 22)  SpO2: 99% (06-08-21 @ 05:45) (97% - 100%)  Wt(kg): --  CAPILLARY BLOOD GLUCOSE      POCT Blood Glucose.: 97 mg/dL (07 Jun 2021 23:27)      Labs                          10.3   8.55  )-----------( 241      ( 07 Jun 2021 11:03 )             31.9       06-07    136  |  99  |  27<H>  ----------------------------<  116<H>  3.9   |  29  |  0.80    Ca    9.0      07 Jun 2021 11:03  Phos  3.1     06-07  Mg     2.2     06-07    TPro  7.1  /  Alb  3.2<L>  /  TBili  0.4  /  DBili  x   /  AST  45<H>  /  ALT  94<H>  /  AlkPhos  91  06-07            .Sputum Sputum  04-16 @ 04:42   Moderate Enterobacter aerogenes (Carbapenem Resistant)  Normal Respiratory Monserrat present  --  Enterobacter aerogenes (Carbapenem Resistant)      .Urine Clean Catch (Midstream)  03-15 @ 00:52   No growth  --  --          Radiology Results      Meds    MEDICATIONS  (STANDING):  ascorbic acid 1000 milliGRAM(s) Oral daily  BACItracin   Ointment 1 Application(s) Topical two times a day  calcium carbonate 1250 mG  + Vitamin D (OsCal 500 + D) 1 Tablet(s) Oral daily  chlorhexidine 2% Cloths 1 Application(s) Topical <User Schedule>  cholecalciferol 1000 Unit(s) Oral daily  clonazePAM  Tablet 0.5 milliGRAM(s) Oral every 12 hours  enoxaparin Injectable 40 milliGRAM(s) SubCutaneous every 24 hours  gabapentin 100 milliGRAM(s) Oral every 8 hours  labetalol 100 milliGRAM(s) Enteral Tube two times a day  naloxegol 12.5 milliGRAM(s) Oral daily  pantoprazole  Injectable 40 milliGRAM(s) IV Push daily  predniSONE   Tablet 15 milliGRAM(s) Oral daily  QUEtiapine 50 milliGRAM(s) Oral two times a day  senna 2 Tablet(s) Oral at bedtime  trimethoprim  40 mG/sulfamethoxazole 200 mG Suspension 160 milliGRAM(s) Oral <User Schedule>      MEDICATIONS  (PRN):  acetaminophen    Suspension .. 650 milliGRAM(s) Enteral Tube every 12 hours PRN Temp greater or equal to 38C (100.4F), Mild Pain (1 - 3)  ALBUTerol    90 MICROgram(s) HFA Inhaler 2 Puff(s) Inhalation every 6 hours PRN Shortness of Breath and/or Wheezing  artificial  tears Solution 1 Drop(s) Both EYES every 4 hours PRN Dry Eyes  bisacodyl Suppository 10 milliGRAM(s) Rectal daily PRN Constipation  ondansetron Injectable 4 milliGRAM(s) IV Push every 6 hours PRN Nausea and/or Vomiting  polyethylene glycol 3350 17 Gram(s) Oral daily PRN Constipation  sodium chloride 0.9% lock flush 10 milliLiter(s) IV Push every 1 hour PRN Pre/post blood products, medications, blood draw, and to maintain line patency      Physical Exam    Neuro :  no focal deficits  Respiratory: CTA B/L  CV: RRR, S1S2, no murmurs,   Abdominal: Soft, NT, ND +BS, gastrostomy tube inplace  Extremities: edema of extrem, + peripheral pulses      ASSESSMENT      Hypoxemia 2nd to covid pna   transaminitis  prediabetes  h/o appendectomy  cholecystectomy        PLAN    cont cont precautions  covid 5/14/21 neg noted above  d/c remdesevir given covid ab positive noted   completed dexamethasone   started pulse steroids for 3 days - 250mg solumedrol bid now tapered off 4/14/21   prednisone 20 daily d/c 6/1/21   cont on bactrim empirically, TIW   cont asa, vit c,    cont albuterol inhaler   pulm f/u  procalcitonin, D-dimer, crp, ldh, ferritin, lactate noted ,    cont tylenol prn,   cont robitussin prn   pt off precedex    pt on methadone   pain mgmt eval noted   off phenylephrine drip for hypotension  clonidine held 2nd to low bp  s/p intubation 3/29/21   s/p tracheostomy 4/21/21  O2 sat  100% (100% - 100%) mv 40%  O2 via mech vent   cont wean as tolerated   pt tolerated 9hrs of only pressure support 6/4/21  vent mgmt as per scu  thoracic surg f/u   s/p L chest tube replacement 4/18/21 for recurrence of left pneumothorax   cxr 4/20/21 with Unchanged advanced infiltrates and catheter left chest tube noted   CXR 4/11/21 with : No interval change compared to one day prior. No pneumothorax noted.   unable to flush or aspirate tube fully, noted debris in tubing which was milked out.   Once material removed from tubing able to aspirated and flush fully. Also changed dressing on pigtail which appears to be in good position.   Monitor O2 status   s/p tracheostomy 4/21/21   cxr 4/21/21 with No evidence of active chest disease. Tracheostomy tube in place otherwise no significant change noted   cxr 4/25/21 with A 40% LEFT pneumothorax. LEFT multi-sidehole pigtail catheter overlies LEFT lower hemithorax.. Bilateral multifocal and diffuse ill-defined airspace opacities..  Follow-up AP portable chest radiograph 4/25/2021 AT 8:58 AM: Residual LEFT 30% upper zone pneumothorax. Otherwise no interval change.noted    s/p 2nd chest tube 5/5/21  cxr 5/6/21 with Tracheostomy and catheter left chest tubes remain. , There are significant diffuse advanced infiltrates again noted. No pneumothorax. Above findings are similar to study earlier in the day. Present film shows a left jugular line inserted   with tip entering the superior vena cava noted   cxr 5/30/21 with Tracheostomy cannula left chest catheter reidentified in position. No change small simple left apical pneumothorax. No change bilateral airspace opacities. Trace left pleural effusion suggested noted   cxr 5/31/21 with Status post tracheostomy. Status post left chest pigtail catheter. Trace left pneumothorax is unchanged. Cardiomegaly. Moderate to severe multifocal patchy opacificationof both lungs, left greater than right, is unchanged. Nonobstructive bowel gas pattern. No dilated loops of small or large bowel noted   s/p removal of L pigtail.   CXR 6/1/21 post l pig tail removal with Left pigtail catheter seen earlier in the day has been removed. Persistent amorphous infiltrate at the site is seen. No pneumothorax noted above.  No acute thoracic surgical intervention at this time,  s/p surgical placement of gastrostomy tube 4/23/2021.   abd xray 5/10/21 with ileus noted   dressing changed daily for seroma   abd xray 5/21/21 with Decreased bowel ileus compared to prior 5/20/2021 exam noted above.   abd xray 5/22/21 with No dilated loops of bowel noted above.   cont tube feeding   CT scan of the chest 5/13/21 demonstrates diffuse bilateral infiltrates with a region of consolidation at the left lung base. Small left pneumothorax. 2 left chest tubes noted in place noted above.  CT scan of the abdomen and pelvis 5/13/21 demonstrates diffuse dilatation of small and large bowel loops most consistent with ileus noted   xray abd with  5/14/21 with Ingested contrast within nonobstructed large bowel to the level of rectum. No acute radiographic intra-abdominal findings noted   ct abd-pelv 5/27/21 with Delayed nephrographic phase enhancement of the nonobstructed bilateral kidneys, suggestive of acute renal insufficiency. No evidence of bowel obstruction. Proctitis noted above.   id f/u   patient completed course of Meropenem  leukocytosis resolved  sputum cx with Enterobacter aerogenes (Carbapenem Resistant) noted above   afebrile  Completed  meropenem, s/p 1 dose of Vancomycin   completed zosyn  lispro ss   s/p 4 units prbc for anemia  f/u h/h    transfuse prbc as needed  heme onc f/u  Haptoglobin normal  no hemolysis, Fe/B12/folate adequate  hemolysis is unlikely even his baseline haptoglobin may be very elevated due to COVID  direct pamella neg noted    psych f/u/  C/w Klonopin to 1 mg q8h standing (no PRNs), with plan to further taper as tolerated  cont Seroquel to 50 mg BID standing starting tonight  Continue delirium precautions: Frequent reorientation, familiar pictures and objects at bedside, natural light in daytime,   consistent sleep/wake schedule, adequate hydration and nutrition, sensory aids (hearing aids, glasses) present if needed, minimizing noise and overstimulation,   clustering care to minimize overnight interruptions, judicious use of deliriogenic medications (anticholinergics, benzodiazepines and opioid analgesics), minimize use of restraints  phys tx eval  cont current meds   d/c planning for vent facility   mgmt as per scu         Patient is a 55y old  Male who presents with a chief complaint of SOB (07 Jun 2021 11:33)    pt seen in icu [  ], reg med floor scu [ x  ], bed [ x ], chair at bedside [   ], awake and responsive [ x ], mildly sedated, [  ],    nad [x  ]      Allergies    No Known Allergies        Vitals    T(F): 97.1 (06-08-21 @ 05:45), Max: 98 (06-07-21 @ 06:27)  HR: 82 (06-08-21 @ 05:45) (74 - 101)  BP: 136/80 (06-08-21 @ 05:45) (135/84 - 148/82)  RR: 17 (06-08-21 @ 05:45) (17 - 22)  SpO2: 99% (06-08-21 @ 05:45) (97% - 100%)  Wt(kg): --  CAPILLARY BLOOD GLUCOSE      POCT Blood Glucose.: 97 mg/dL (07 Jun 2021 23:27)      Labs                          10.3   8.55  )-----------( 241      ( 07 Jun 2021 11:03 )             31.9       06-07    136  |  99  |  27<H>  ----------------------------<  116<H>  3.9   |  29  |  0.80    Ca    9.0      07 Jun 2021 11:03  Phos  3.1     06-07  Mg     2.2     06-07    TPro  7.1  /  Alb  3.2<L>  /  TBili  0.4  /  DBili  x   /  AST  45<H>  /  ALT  94<H>  /  AlkPhos  91  06-07            .Sputum Sputum  04-16 @ 04:42   Moderate Enterobacter aerogenes (Carbapenem Resistant)  Normal Respiratory Monserrat present  --  Enterobacter aerogenes (Carbapenem Resistant)      .Urine Clean Catch (Midstream)  03-15 @ 00:52   No growth  --  --          Radiology Results      Meds    MEDICATIONS  (STANDING):  ascorbic acid 1000 milliGRAM(s) Oral daily  BACItracin   Ointment 1 Application(s) Topical two times a day  calcium carbonate 1250 mG  + Vitamin D (OsCal 500 + D) 1 Tablet(s) Oral daily  chlorhexidine 2% Cloths 1 Application(s) Topical <User Schedule>  cholecalciferol 1000 Unit(s) Oral daily  clonazePAM  Tablet 0.5 milliGRAM(s) Oral every 12 hours  enoxaparin Injectable 40 milliGRAM(s) SubCutaneous every 24 hours  gabapentin 100 milliGRAM(s) Oral every 8 hours  labetalol 100 milliGRAM(s) Enteral Tube two times a day  naloxegol 12.5 milliGRAM(s) Oral daily  pantoprazole  Injectable 40 milliGRAM(s) IV Push daily  predniSONE   Tablet 15 milliGRAM(s) Oral daily  QUEtiapine 50 milliGRAM(s) Oral two times a day  senna 2 Tablet(s) Oral at bedtime  trimethoprim  40 mG/sulfamethoxazole 200 mG Suspension 160 milliGRAM(s) Oral <User Schedule>      MEDICATIONS  (PRN):  acetaminophen    Suspension .. 650 milliGRAM(s) Enteral Tube every 12 hours PRN Temp greater or equal to 38C (100.4F), Mild Pain (1 - 3)  ALBUTerol    90 MICROgram(s) HFA Inhaler 2 Puff(s) Inhalation every 6 hours PRN Shortness of Breath and/or Wheezing  artificial  tears Solution 1 Drop(s) Both EYES every 4 hours PRN Dry Eyes  bisacodyl Suppository 10 milliGRAM(s) Rectal daily PRN Constipation  ondansetron Injectable 4 milliGRAM(s) IV Push every 6 hours PRN Nausea and/or Vomiting  polyethylene glycol 3350 17 Gram(s) Oral daily PRN Constipation  sodium chloride 0.9% lock flush 10 milliLiter(s) IV Push every 1 hour PRN Pre/post blood products, medications, blood draw, and to maintain line patency      Physical Exam    Neuro :  no focal deficits  Respiratory: CTA B/L  CV: RRR, S1S2, no murmurs,   Abdominal: Soft, NT, ND +BS, gastrostomy tube inplace  Extremities: edema of extrem, + peripheral pulses      ASSESSMENT      Hypoxemia 2nd to covid pna   transaminitis  prediabetes  h/o appendectomy  cholecystectomy        PLAN    cont cont precautions  covid 5/14/21 neg noted above  d/c remdesevir given covid ab positive noted   completed dexamethasone   started pulse steroids for 3 days - 250mg solumedrol bid now tapered off 4/14/21   prednisone 20 daily d/c 6/1/21   cont on bactrim empirically, TIW   cont asa, vit c,    cont albuterol inhaler   pulm f/u  procalcitonin, D-dimer, crp, ldh, ferritin, lactate noted ,    cont tylenol prn,   cont robitussin prn   pt off precedex    pt on methadone   pain mgmt eval noted   off phenylephrine drip for hypotension  clonidine held 2nd to low bp  s/p intubation 3/29/21   s/p tracheostomy 4/21/21  O2 sat  99% (97% - 100%) mv 40%  O2 via mech vent   cont wean as tolerated   pt tolerated 9hrs of only pressure support 6/4/21  vent mgmt as per scu  thoracic surg f/u   s/p L chest tube replacement 4/18/21 for recurrence of left pneumothorax   cxr 4/20/21 with Unchanged advanced infiltrates and catheter left chest tube noted   CXR 4/11/21 with : No interval change compared to one day prior. No pneumothorax noted.   unable to flush or aspirate tube fully, noted debris in tubing which was milked out.   Once material removed from tubing able to aspirated and flush fully. Also changed dressing on pigtail which appears to be in good position.   Monitor O2 status   s/p tracheostomy 4/21/21   cxr 4/21/21 with No evidence of active chest disease. Tracheostomy tube in place otherwise no significant change noted   cxr 4/25/21 with A 40% LEFT pneumothorax. LEFT multi-sidehole pigtail catheter overlies LEFT lower hemithorax.. Bilateral multifocal and diffuse ill-defined airspace opacities..  Follow-up AP portable chest radiograph 4/25/2021 AT 8:58 AM: Residual LEFT 30% upper zone pneumothorax. Otherwise no interval change.noted    s/p 2nd chest tube 5/5/21  cxr 5/6/21 with Tracheostomy and catheter left chest tubes remain. , There are significant diffuse advanced infiltrates again noted. No pneumothorax. Above findings are similar to study earlier in the day. Present film shows a left jugular line inserted   with tip entering the superior vena cava noted   cxr 5/30/21 with Tracheostomy cannula left chest catheter reidentified in position. No change small simple left apical pneumothorax. No change bilateral airspace opacities. Trace left pleural effusion suggested noted   cxr 5/31/21 with Status post tracheostomy. Status post left chest pigtail catheter. Trace left pneumothorax is unchanged. Cardiomegaly. Moderate to severe multifocal patchy opacificationof both lungs, left greater than right, is unchanged. Nonobstructive bowel gas pattern. No dilated loops of small or large bowel noted   s/p removal of L pigtail.   CXR 6/1/21 post l pig tail removal with Left pigtail catheter seen earlier in the day has been removed. Persistent amorphous infiltrate at the site is seen. No pneumothorax noted above.  No acute thoracic surgical intervention at this time,  s/p surgical placement of gastrostomy tube 4/23/2021.   abd xray 5/10/21 with ileus noted   dressing changed daily for seroma   abd xray 5/21/21 with Decreased bowel ileus compared to prior 5/20/2021 exam noted above.   abd xray 5/22/21 with No dilated loops of bowel noted above.   cont tube feeding   CT scan of the chest 5/13/21 demonstrates diffuse bilateral infiltrates with a region of consolidation at the left lung base. Small left pneumothorax. 2 left chest tubes noted in place noted above.  CT scan of the abdomen and pelvis 5/13/21 demonstrates diffuse dilatation of small and large bowel loops most consistent with ileus noted   xray abd with  5/14/21 with Ingested contrast within nonobstructed large bowel to the level of rectum. No acute radiographic intra-abdominal findings noted   ct abd-pelv 5/27/21 with Delayed nephrographic phase enhancement of the nonobstructed bilateral kidneys, suggestive of acute renal insufficiency. No evidence of bowel obstruction. Proctitis noted above.   id f/u   patient completed course of Meropenem  leukocytosis resolved  sputum cx with Enterobacter aerogenes (Carbapenem Resistant) noted above   afebrile  Completed  meropenem, s/p 1 dose of Vancomycin   completed zosyn  lispro ss   s/p 4 units prbc for anemia  f/u h/h    transfuse prbc as needed  heme onc f/u  Haptoglobin normal  no hemolysis, Fe/B12/folate adequate  hemolysis is unlikely even his baseline haptoglobin may be very elevated due to COVID  direct pamella neg noted    psych f/u/  C/w Klonopin to 1 mg q8h standing (no PRNs), with plan to further taper as tolerated  cont Seroquel to 50 mg BID standing starting tonight  Continue delirium precautions: Frequent reorientation, familiar pictures and objects at bedside, natural light in daytime,   consistent sleep/wake schedule, adequate hydration and nutrition, sensory aids (hearing aids, glasses) present if needed, minimizing noise and overstimulation,   clustering care to minimize overnight interruptions, judicious use of deliriogenic medications (anticholinergics, benzodiazepines and opioid analgesics), minimize use of restraints  cont current meds   d/c planning for vent facility   mgmt as per scu

## 2021-06-08 NOTE — BH CONSULTATION LIAISON PROGRESS NOTE - NSBHMSELANG_PSY_A_CORE
No abnormalities noted/Other
Unable to assess
No abnormalities noted/Other
Unable to assess
Unable to assess
No abnormalities noted/Other

## 2021-06-08 NOTE — BH CONSULTATION LIAISON PROGRESS NOTE - NSBHMSEPERCEPT_PSY_A_CORE
Unable to assess
No abnormalities
Unable to assess

## 2021-06-08 NOTE — BH CONSULTATION LIAISON PROGRESS NOTE - NSBHMSETHTASSOC_PSY_A_CORE
Unable to assess
Normal

## 2021-06-08 NOTE — BH CONSULTATION LIAISON PROGRESS NOTE - NSBHPSYCHOLCOGORIENT_PSY_A_CORE
[facility]    Chief Complaint   Patient presents with     Nursing Home Acute       HPI:    Lucrecia Taveras is a 89 year old  (8/12/1928), who is being seen today for an episodic care visit at [facility] .  HPI information obtained from: facility chart records, facility staff and patient report.    Today's concern is:  Infection of nail bed of finger, unspecified laterality    Nursing reports yellow crusting and drainage around cuticles of several fingers    ALLERGIES: Red dye; Caffeine; Codeine; Iodine; Melatonin; and Morphine  Past Medical, Surgical, Family and Social History reviewed and updated in EPIC.    REVIEW OF SYSTEMS:  Unobtainable secondary to cognitive impairment. - denies pain of fingers    Physical Exam:  /80  Pulse 77  Temp 97.9  F (36.6  C)  Resp 18  Wt 121 lb (54.9 kg)  BMI 26.18 kg/m2  GENERAL APPEARANCE:  Alert, in no distress  SKIN: hypertrophic thickened fingernails present, 3 fingers on right hand and 2 fingers on left hand with yellow crusting around cuticle.   PSYCH:  insight and judgement, memory impaired, affect and mood normal     Recent Labs:  n/a    Assessment/Plan:  Infection of nail bed of finger, unspecified laterality  Several fingers affected.      Orders:  Keflex 500 mg po bid x 7 days  Soak fingers in warm soapy water for 10 minutes once daily      Electronically signed by  Mindi Valenzuela NP  
Unable to assess
Unable to assess
Oriented to time, place, person, situation
Unable to assess
Oriented to time, place, person, situation
Oriented to time, place, person, situation

## 2021-06-08 NOTE — BH CONSULTATION LIAISON PROGRESS NOTE - NSBHASSESSMENTFT_PSY_ALL_CORE
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 Psych consulted for med management due to difficulty weaning off sedation. On initial exam, pt is highly somnolent and unable to participate, but hx and current presentation appear highly c/w acute multifactorial delirium i/s/o AHRF, BZD withdrawal and opioid withdrawal. On f/u today, pt presents more awake, alert and interactive, and communicates in writing, reporting that he feels "a little bit tired" and denying SI. Pt disposition will depend on clinical course, but there is currently no reason to believe that pt will require admission to IP psychiatry. 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 Psych consulted for med management due to difficulty weaning off sedation. On initial exam, pt is highly somnolent and unable to participate, but hx and current presentation appear highly c/w acute multifactorial delirium i/s/o AHRF, BZD withdrawal and opioid withdrawal. On f/u today, pt presents more awake, alert and interactive, and communicates in writing, reporting that he feels "so-so" and denying SI. Pt has reportedly been accepted to a [facility] facility, and he is psych cleared for dispo as of today's assessment. Psychiatry is signing off.

## 2021-06-08 NOTE — BH CONSULTATION LIAISON PROGRESS NOTE - NSBHMSESPEECH_PSY_A_CORE
Non-verbal: unable to assess speech further
Non-verbal: unable to assess speech further
Abnormal as indicated, otherwise normal...
Non-verbal: unable to assess speech further
Abnormal as indicated, otherwise normal...
Non-verbal: unable to assess speech further
Non-verbal: unable to assess speech further
Abnormal as indicated, otherwise normal...

## 2021-06-08 NOTE — BH CONSULTATION LIAISON PROGRESS NOTE - NSBHROSSYSTEMS_PSY_ALL_CORE
Constitutional Symptoms...
Constitutional Symptoms...
Respiratory...
Constitutional Symptoms...
Respiratory...
Constitutional Symptoms...

## 2021-06-08 NOTE — BH CONSULTATION LIAISON PROGRESS NOTE - NSBHMSEBEHAV_PSY_A_CORE
Cooperative/Unable to assess
Unable to assess
Cooperative/Unable to assess
Unable to assess
Cooperative/Unable to assess
Cooperative/Unable to assess

## 2021-06-08 NOTE — BH CONSULTATION LIAISON PROGRESS NOTE - NSBHMSEAFFQUAL_PSY_A_CORE
Euthymic
Euthymic/Unable to assess
Unable to assess
Euthymic
Euthymic/Unable to assess
Unable to assess
Euthymic
Depressed

## 2021-06-08 NOTE — BH CONSULTATION LIAISON PROGRESS NOTE - NSBHMSETHTPROC_PSY_A_CORE
Linear
Linear
Unable to assess
Linear
Unable to assess
Unable to assess

## 2021-06-08 NOTE — BH CONSULTATION LIAISON PROGRESS NOTE - NSBHPTASSESSDT_PSY_A_CORE
08-Jun-2021 11:00
21-May-2021 10:00
24-May-2021 10:15
07-Jun-2021 14:05
20-May-2021 10:00
01-Jun-2021 12:15
27-May-2021 17:20
25-May-2021 11:21

## 2021-06-08 NOTE — BH CONSULTATION LIAISON PROGRESS NOTE - NSICDXBHPRIMARYDX_PSY_ALL_CORE
Benzodiazepine withdrawal   F13.239  

## 2021-06-08 NOTE — BH CONSULTATION LIAISON PROGRESS NOTE - CURRENT MEDICATION
MEDICATIONS  (STANDING):  ascorbic acid 1000 milliGRAM(s) Oral daily  BACItracin   Ointment 1 Application(s) Topical two times a day  calcium carbonate 1250 mG  + Vitamin D (OsCal 500 + D) 1 Tablet(s) Oral daily  chlorhexidine 2% Cloths 1 Application(s) Topical <User Schedule>  cholecalciferol 1000 Unit(s) Oral daily  clonazePAM  Tablet 0.5 milliGRAM(s) Oral every 12 hours  enoxaparin Injectable 40 milliGRAM(s) SubCutaneous every 24 hours  gabapentin 100 milliGRAM(s) Oral every 8 hours  labetalol 100 milliGRAM(s) Enteral Tube two times a day  pantoprazole  Injectable 40 milliGRAM(s) IV Push daily  predniSONE   Tablet 15 milliGRAM(s) Oral daily  QUEtiapine 50 milliGRAM(s) Oral two times a day  senna 2 Tablet(s) Oral at bedtime  trimethoprim  40 mG/sulfamethoxazole 200 mG Suspension 160 milliGRAM(s) Oral <User Schedule>    MEDICATIONS  (PRN):  acetaminophen    Suspension .. 650 milliGRAM(s) Enteral Tube every 12 hours PRN Temp greater or equal to 38C (100.4F), Mild Pain (1 - 3)  ALBUTerol    90 MICROgram(s) HFA Inhaler 2 Puff(s) Inhalation every 6 hours PRN Shortness of Breath and/or Wheezing  artificial  tears Solution 1 Drop(s) Both EYES every 4 hours PRN Dry Eyes  bisacodyl Suppository 10 milliGRAM(s) Rectal daily PRN Constipation  ondansetron Injectable 4 milliGRAM(s) IV Push every 6 hours PRN Nausea and/or Vomiting  polyethylene glycol 3350 17 Gram(s) Oral daily PRN Constipation  sodium chloride 0.9% lock flush 10 milliLiter(s) IV Push every 1 hour PRN Pre/post blood products, medications, blood draw, and to maintain line patency  
MEDICATIONS  (STANDING):  ascorbic acid 1000 milliGRAM(s) Oral daily  BACItracin   Ointment 1 Application(s) Topical two times a day  calcium carbonate 1250 mG  + Vitamin D (OsCal 500 + D) 1 Tablet(s) Oral daily  chlorhexidine 2% Cloths 1 Application(s) Topical <User Schedule>  cholecalciferol 1000 Unit(s) Oral daily  clonazePAM  Tablet 0.5 milliGRAM(s) Oral every 8 hours  enoxaparin Injectable 40 milliGRAM(s) SubCutaneous every 24 hours  gabapentin 100 milliGRAM(s) Oral every 8 hours  labetalol 100 milliGRAM(s) Enteral Tube two times a day  methadone    Tablet 5 milliGRAM(s) Oral daily  naloxegol 12.5 milliGRAM(s) Oral daily  ondansetron Injectable 4 milliGRAM(s) IV Push every 6 hours  pantoprazole  Injectable 40 milliGRAM(s) IV Push daily  QUEtiapine 50 milliGRAM(s) Oral two times a day  senna 2 Tablet(s) Oral at bedtime  trimethoprim  40 mG/sulfamethoxazole 200 mG Suspension 160 milliGRAM(s) Oral <User Schedule>    MEDICATIONS  (PRN):  acetaminophen    Suspension .. 650 milliGRAM(s) Enteral Tube every 12 hours PRN Temp greater or equal to 38C (100.4F), Mild Pain (1 - 3)  ALBUTerol    90 MICROgram(s) HFA Inhaler 2 Puff(s) Inhalation every 6 hours PRN Shortness of Breath and/or Wheezing  artificial  tears Solution 1 Drop(s) Both EYES every 4 hours PRN Dry Eyes  bisacodyl Suppository 10 milliGRAM(s) Rectal daily PRN Constipation  ondansetron Injectable 4 milliGRAM(s) IV Push every 6 hours PRN Nausea and/or Vomiting  polyethylene glycol 3350 17 Gram(s) Oral daily PRN Constipation  sodium chloride 0.9% lock flush 10 milliLiter(s) IV Push every 1 hour PRN Pre/post blood products, medications, blood draw, and to maintain line patency  
MEDICATIONS  (STANDING):  ascorbic acid 1000 milliGRAM(s) Oral daily  BACItracin   Ointment 1 Application(s) Topical two times a day  calcium carbonate 1250 mG  + Vitamin D (OsCal 500 + D) 1 Tablet(s) Oral daily  chlorhexidine 2% Cloths 1 Application(s) Topical <User Schedule>  cholecalciferol 1000 Unit(s) Oral daily  clonazePAM  Tablet 0.5 milliGRAM(s) Oral every 12 hours  enoxaparin Injectable 40 milliGRAM(s) SubCutaneous every 24 hours  gabapentin 100 milliGRAM(s) Oral every 8 hours  labetalol 100 milliGRAM(s) Enteral Tube two times a day  naloxegol 12.5 milliGRAM(s) Oral daily  pantoprazole  Injectable 40 milliGRAM(s) IV Push daily  predniSONE   Tablet 15 milliGRAM(s) Oral daily  QUEtiapine 50 milliGRAM(s) Oral two times a day  senna 2 Tablet(s) Oral at bedtime  trimethoprim  40 mG/sulfamethoxazole 200 mG Suspension 160 milliGRAM(s) Oral <User Schedule>    MEDICATIONS  (PRN):  acetaminophen    Suspension .. 650 milliGRAM(s) Enteral Tube every 12 hours PRN Temp greater or equal to 38C (100.4F), Mild Pain (1 - 3)  ALBUTerol    90 MICROgram(s) HFA Inhaler 2 Puff(s) Inhalation every 6 hours PRN Shortness of Breath and/or Wheezing  artificial  tears Solution 1 Drop(s) Both EYES every 4 hours PRN Dry Eyes  bisacodyl Suppository 10 milliGRAM(s) Rectal daily PRN Constipation  ondansetron Injectable 4 milliGRAM(s) IV Push every 6 hours PRN Nausea and/or Vomiting  polyethylene glycol 3350 17 Gram(s) Oral daily PRN Constipation  sodium chloride 0.9% lock flush 10 milliLiter(s) IV Push every 1 hour PRN Pre/post blood products, medications, blood draw, and to maintain line patency  
MEDICATIONS  (STANDING):  ascorbic acid 1000 milliGRAM(s) Oral daily  BACItracin   Ointment 1 Application(s) Topical two times a day  bisacodyl Suppository 10 milliGRAM(s) Rectal daily  chlorhexidine 0.12% Liquid 15 milliLiter(s) Oral Mucosa every 12 hours  chlorhexidine 2% Cloths 1 Application(s) Topical <User Schedule>  clonazePAM  Tablet 1 milliGRAM(s) Oral every 8 hours  enoxaparin Injectable 40 milliGRAM(s) SubCutaneous every 24 hours  gabapentin 100 milliGRAM(s) Oral every 8 hours  insulin lispro (ADMELOG) corrective regimen sliding scale   SubCutaneous every 6 hours  lactulose Syrup 10 Gram(s) Oral daily  methadone    Tablet 10 milliGRAM(s) Oral <User Schedule>  methadone    Tablet 5 milliGRAM(s) Oral <User Schedule>  metoclopramide Injectable 10 milliGRAM(s) IV Push three times a day  midodrine 10 milliGRAM(s) Oral every 8 hours  mupirocin 2% Ointment 1 Application(s) Both Nostrils two times a day  pantoprazole   Suspension 40 milliGRAM(s) Oral daily  phenylephrine    Infusion 1.5 MICROgram(s)/kG/Min (47.2 mL/Hr) IV Continuous <Continuous>  piperacillin/tazobactam IVPB.. 3.375 Gram(s) IV Intermittent every 12 hours  predniSONE   Tablet 25 milliGRAM(s) Oral daily  QUEtiapine 75 milliGRAM(s) Oral two times a day  sodium chloride 0.9%. 1000 milliLiter(s) (50 mL/Hr) IV Continuous <Continuous>  trimethoprim  40 mG/sulfamethoxazole 200 mG Suspension 160 milliGRAM(s) Oral <User Schedule>    MEDICATIONS  (PRN):  acetaminophen    Suspension .. 650 milliGRAM(s) Oral every 12 hours PRN Temp greater or equal to 38C (100.4F)  ALBUTerol    90 MICROgram(s) HFA Inhaler 2 Puff(s) Inhalation every 6 hours PRN Shortness of Breath and/or Wheezing  artificial  tears Solution 1 Drop(s) Both EYES every 4 hours PRN Dry Eyes  LORazepam   Injectable 2 milliGRAM(s) IV Push every 6 hours PRN Agitation  sodium chloride 0.9% lock flush 10 milliLiter(s) IV Push every 1 hour PRN Pre/post blood products, medications, blood draw, and to maintain line patency  
MEDICATIONS  (STANDING):  ascorbic acid 1000 milliGRAM(s) Oral daily  BACItracin   Ointment 1 Application(s) Topical two times a day  bisacodyl Suppository 10 milliGRAM(s) Rectal daily  chlorhexidine 0.12% Liquid 15 milliLiter(s) Oral Mucosa every 12 hours  chlorhexidine 2% Cloths 1 Application(s) Topical <User Schedule>  clonazePAM  Tablet 2 milliGRAM(s) Oral every 8 hours  enoxaparin Injectable 40 milliGRAM(s) SubCutaneous every 24 hours  gabapentin 100 milliGRAM(s) Oral every 8 hours  insulin lispro (ADMELOG) corrective regimen sliding scale   SubCutaneous every 6 hours  lactulose Syrup 10 Gram(s) Oral daily  methadone    Tablet 10 milliGRAM(s) Oral every 8 hours  metoclopramide Injectable 10 milliGRAM(s) IV Push three times a day  midodrine 10 milliGRAM(s) Oral every 8 hours  mupirocin 2% Ointment 1 Application(s) Both Nostrils two times a day  pantoprazole   Suspension 40 milliGRAM(s) Oral daily  phenylephrine    Infusion 1.5 MICROgram(s)/kG/Min (47.2 mL/Hr) IV Continuous <Continuous>  piperacillin/tazobactam IVPB.. 3.375 Gram(s) IV Intermittent every 8 hours  predniSONE   Tablet 25 milliGRAM(s) Oral daily  QUEtiapine 75 milliGRAM(s) Oral two times a day  trimethoprim  40 mG/sulfamethoxazole 200 mG Suspension 160 milliGRAM(s) Oral <User Schedule>    MEDICATIONS  (PRN):  acetaminophen    Suspension .. 650 milliGRAM(s) Oral every 12 hours PRN Temp greater or equal to 38C (100.4F)  ALBUTerol    90 MICROgram(s) HFA Inhaler 2 Puff(s) Inhalation every 6 hours PRN Shortness of Breath and/or Wheezing  artificial  tears Solution 1 Drop(s) Both EYES every 4 hours PRN Dry Eyes  LORazepam   Injectable 2 milliGRAM(s) IV Push every 6 hours PRN Agitation  sodium chloride 0.9% lock flush 10 milliLiter(s) IV Push every 1 hour PRN Pre/post blood products, medications, blood draw, and to maintain line patency  
MEDICATIONS  (STANDING):  ascorbic acid 1000 milliGRAM(s) Oral daily  BACItracin   Ointment 1 Application(s) Topical two times a day  bisacodyl Suppository 10 milliGRAM(s) Rectal daily  calcium carbonate 1250 mG  + Vitamin D (OsCal 500 + D) 1 Tablet(s) Oral daily  chlorhexidine 2% Cloths 1 Application(s) Topical <User Schedule>  cholecalciferol 1000 Unit(s) Oral daily  clonazePAM  Tablet 1 milliGRAM(s) Oral every 8 hours  enoxaparin Injectable 40 milliGRAM(s) SubCutaneous every 24 hours  gabapentin 100 milliGRAM(s) Oral every 8 hours  iohexol 300 mG (iodine)/mL Oral Solution 30 milliLiter(s) Oral once  labetalol 100 milliGRAM(s) Enteral Tube two times a day  methadone    Tablet 5 milliGRAM(s) Oral every 12 hours  naloxegol 12.5 milliGRAM(s) Oral daily  pantoprazole  Injectable 40 milliGRAM(s) IV Push daily  predniSONE   Tablet 20 milliGRAM(s) Oral daily  QUEtiapine 50 milliGRAM(s) Oral two times a day  sodium chloride 0.9%. 1000 milliLiter(s) (75 mL/Hr) IV Continuous <Continuous>  trimethoprim  40 mG/sulfamethoxazole 200 mG Suspension 160 milliGRAM(s) Oral <User Schedule>    MEDICATIONS  (PRN):  acetaminophen    Suspension .. 650 milliGRAM(s) Oral every 12 hours PRN Temp greater or equal to 38C (100.4F)  ALBUTerol    90 MICROgram(s) HFA Inhaler 2 Puff(s) Inhalation every 6 hours PRN Shortness of Breath and/or Wheezing  artificial  tears Solution 1 Drop(s) Both EYES every 4 hours PRN Dry Eyes  sodium chloride 0.9% lock flush 10 milliLiter(s) IV Push every 1 hour PRN Pre/post blood products, medications, blood draw, and to maintain line patency  
MEDICATIONS  (STANDING):  ascorbic acid 1000 milliGRAM(s) Oral daily  BACItracin   Ointment 1 Application(s) Topical two times a day  bisacodyl Suppository 10 milliGRAM(s) Rectal daily  chlorhexidine 2% Cloths 1 Application(s) Topical <User Schedule>  clonazePAM  Tablet 1 milliGRAM(s) Oral every 8 hours  enoxaparin Injectable 40 milliGRAM(s) SubCutaneous every 24 hours  gabapentin 100 milliGRAM(s) Oral every 8 hours  insulin lispro (ADMELOG) corrective regimen sliding scale   SubCutaneous every 6 hours  lactulose Syrup 10 Gram(s) Oral daily  methadone    Tablet 5 milliGRAM(s) Oral every 12 hours  midodrine 5 milliGRAM(s) Oral every 8 hours  naloxegol 25 milliGRAM(s) Oral daily  pantoprazole   Suspension 40 milliGRAM(s) Oral daily  predniSONE   Tablet 20 milliGRAM(s) Oral daily  QUEtiapine 50 milliGRAM(s) Oral two times a day  trimethoprim  40 mG/sulfamethoxazole 200 mG Suspension 160 milliGRAM(s) Oral <User Schedule>    MEDICATIONS  (PRN):  acetaminophen    Suspension .. 650 milliGRAM(s) Oral every 12 hours PRN Temp greater or equal to 38C (100.4F)  ALBUTerol    90 MICROgram(s) HFA Inhaler 2 Puff(s) Inhalation every 6 hours PRN Shortness of Breath and/or Wheezing  artificial  tears Solution 1 Drop(s) Both EYES every 4 hours PRN Dry Eyes  sodium chloride 0.9% lock flush 10 milliLiter(s) IV Push every 1 hour PRN Pre/post blood products, medications, blood draw, and to maintain line patency  
MEDICATIONS  (STANDING):  ascorbic acid 1000 milliGRAM(s) Oral daily  BACItracin   Ointment 1 Application(s) Topical two times a day  bisacodyl Suppository 10 milliGRAM(s) Rectal daily  chlorhexidine 2% Cloths 1 Application(s) Topical <User Schedule>  clonazePAM  Tablet 1 milliGRAM(s) Oral every 8 hours  enoxaparin Injectable 40 milliGRAM(s) SubCutaneous every 24 hours  gabapentin 100 milliGRAM(s) Oral every 8 hours  insulin lispro (ADMELOG) corrective regimen sliding scale   SubCutaneous every 6 hours  lactulose Syrup 10 Gram(s) Oral daily  methadone    Tablet 5 milliGRAM(s) Oral every 8 hours  midodrine 5 milliGRAM(s) Oral every 8 hours  naloxegol 25 milliGRAM(s) Oral daily  pantoprazole   Suspension 40 milliGRAM(s) Oral daily  predniSONE   Tablet 20 milliGRAM(s) Oral daily  QUEtiapine 50 milliGRAM(s) Oral two times a day  trimethoprim  40 mG/sulfamethoxazole 200 mG Suspension 160 milliGRAM(s) Oral <User Schedule>    MEDICATIONS  (PRN):  acetaminophen    Suspension .. 650 milliGRAM(s) Oral every 12 hours PRN Temp greater or equal to 38C (100.4F)  ALBUTerol    90 MICROgram(s) HFA Inhaler 2 Puff(s) Inhalation every 6 hours PRN Shortness of Breath and/or Wheezing  artificial  tears Solution 1 Drop(s) Both EYES every 4 hours PRN Dry Eyes  sodium chloride 0.9% lock flush 10 milliLiter(s) IV Push every 1 hour PRN Pre/post blood products, medications, blood draw, and to maintain line patency

## 2021-06-09 LAB
ALBUMIN SERPL ELPH-MCNC: 3 G/DL — LOW (ref 3.5–5)
ALP SERPL-CCNC: 92 U/L — SIGNIFICANT CHANGE UP (ref 40–120)
ALT FLD-CCNC: 139 U/L DA — HIGH (ref 10–60)
ANION GAP SERPL CALC-SCNC: 8 MMOL/L — SIGNIFICANT CHANGE UP (ref 5–17)
AST SERPL-CCNC: 51 U/L — HIGH (ref 10–40)
BILIRUB SERPL-MCNC: 0.5 MG/DL — SIGNIFICANT CHANGE UP (ref 0.2–1.2)
BUN SERPL-MCNC: 23 MG/DL — HIGH (ref 7–18)
CALCIUM SERPL-MCNC: 9.4 MG/DL — SIGNIFICANT CHANGE UP (ref 8.4–10.5)
CHLORIDE SERPL-SCNC: 98 MMOL/L — SIGNIFICANT CHANGE UP (ref 96–108)
CO2 SERPL-SCNC: 30 MMOL/L — SIGNIFICANT CHANGE UP (ref 22–31)
CREAT SERPL-MCNC: 0.73 MG/DL — SIGNIFICANT CHANGE UP (ref 0.5–1.3)
GLUCOSE BLDC GLUCOMTR-MCNC: 115 MG/DL — HIGH (ref 70–99)
GLUCOSE BLDC GLUCOMTR-MCNC: 133 MG/DL — HIGH (ref 70–99)
GLUCOSE BLDC GLUCOMTR-MCNC: 144 MG/DL — HIGH (ref 70–99)
GLUCOSE BLDC GLUCOMTR-MCNC: 99 MG/DL — SIGNIFICANT CHANGE UP (ref 70–99)
GLUCOSE SERPL-MCNC: 117 MG/DL — HIGH (ref 70–99)
HCT VFR BLD CALC: 32.3 % — LOW (ref 39–50)
HGB BLD-MCNC: 10.4 G/DL — LOW (ref 13–17)
MAGNESIUM SERPL-MCNC: 2.2 MG/DL — SIGNIFICANT CHANGE UP (ref 1.6–2.6)
MCHC RBC-ENTMCNC: 31.7 PG — SIGNIFICANT CHANGE UP (ref 27–34)
MCHC RBC-ENTMCNC: 32.2 GM/DL — SIGNIFICANT CHANGE UP (ref 32–36)
MCV RBC AUTO: 98.5 FL — SIGNIFICANT CHANGE UP (ref 80–100)
NRBC # BLD: 0 /100 WBCS — SIGNIFICANT CHANGE UP (ref 0–0)
PHOSPHATE SERPL-MCNC: 3.5 MG/DL — SIGNIFICANT CHANGE UP (ref 2.5–4.5)
PLATELET # BLD AUTO: 263 K/UL — SIGNIFICANT CHANGE UP (ref 150–400)
POTASSIUM SERPL-MCNC: 3.4 MMOL/L — LOW (ref 3.5–5.3)
POTASSIUM SERPL-SCNC: 3.4 MMOL/L — LOW (ref 3.5–5.3)
PROT SERPL-MCNC: 7 G/DL — SIGNIFICANT CHANGE UP (ref 6–8.3)
RBC # BLD: 3.28 M/UL — LOW (ref 4.2–5.8)
RBC # FLD: 14.7 % — HIGH (ref 10.3–14.5)
SARS-COV-2 RNA SPEC QL NAA+PROBE: SIGNIFICANT CHANGE UP
SODIUM SERPL-SCNC: 136 MMOL/L — SIGNIFICANT CHANGE UP (ref 135–145)
WBC # BLD: 9 K/UL — SIGNIFICANT CHANGE UP (ref 3.8–10.5)
WBC # FLD AUTO: 9 K/UL — SIGNIFICANT CHANGE UP (ref 3.8–10.5)

## 2021-06-09 RX ORDER — POTASSIUM CHLORIDE 20 MEQ
40 PACKET (EA) ORAL ONCE
Refills: 0 | Status: COMPLETED | OUTPATIENT
Start: 2021-06-09 | End: 2021-06-09

## 2021-06-09 RX ADMIN — Medication 100 MILLIGRAM(S): at 17:26

## 2021-06-09 RX ADMIN — Medication 1000 UNIT(S): at 11:43

## 2021-06-09 RX ADMIN — ENOXAPARIN SODIUM 40 MILLIGRAM(S): 100 INJECTION SUBCUTANEOUS at 11:43

## 2021-06-09 RX ADMIN — Medication 1 TABLET(S): at 11:43

## 2021-06-09 RX ADMIN — QUETIAPINE FUMARATE 50 MILLIGRAM(S): 200 TABLET, FILM COATED ORAL at 21:51

## 2021-06-09 RX ADMIN — PANTOPRAZOLE SODIUM 40 MILLIGRAM(S): 20 TABLET, DELAYED RELEASE ORAL at 11:44

## 2021-06-09 RX ADMIN — QUETIAPINE FUMARATE 50 MILLIGRAM(S): 200 TABLET, FILM COATED ORAL at 11:43

## 2021-06-09 RX ADMIN — SENNA PLUS 2 TABLET(S): 8.6 TABLET ORAL at 21:51

## 2021-06-09 RX ADMIN — GABAPENTIN 100 MILLIGRAM(S): 400 CAPSULE ORAL at 06:31

## 2021-06-09 RX ADMIN — Medication 1 APPLICATION(S): at 06:32

## 2021-06-09 RX ADMIN — Medication 40 MILLIEQUIVALENT(S): at 11:42

## 2021-06-09 RX ADMIN — Medication 1000 MILLIGRAM(S): at 11:44

## 2021-06-09 RX ADMIN — Medication 10 MILLIGRAM(S): at 09:30

## 2021-06-09 RX ADMIN — GABAPENTIN 100 MILLIGRAM(S): 400 CAPSULE ORAL at 15:16

## 2021-06-09 RX ADMIN — POLYETHYLENE GLYCOL 3350 17 GRAM(S): 17 POWDER, FOR SOLUTION ORAL at 06:31

## 2021-06-09 RX ADMIN — Medication 1 APPLICATION(S): at 17:26

## 2021-06-09 RX ADMIN — GABAPENTIN 100 MILLIGRAM(S): 400 CAPSULE ORAL at 21:51

## 2021-06-09 RX ADMIN — Medication 15 MILLIGRAM(S): at 06:32

## 2021-06-09 RX ADMIN — Medication 0.5 MILLIGRAM(S): at 17:26

## 2021-06-09 RX ADMIN — Medication 160 MILLIGRAM(S): at 06:32

## 2021-06-09 RX ADMIN — CHLORHEXIDINE GLUCONATE 1 APPLICATION(S): 213 SOLUTION TOPICAL at 06:31

## 2021-06-09 RX ADMIN — Medication 100 MILLIGRAM(S): at 06:32

## 2021-06-09 RX ADMIN — Medication 0.5 MILLIGRAM(S): at 06:31

## 2021-06-09 NOTE — CHART NOTE - NSCHARTNOTEFT_GEN_A_CORE
Contacted and spoke with Alec [phone].  Explained respiratory status and  condition, answered all questions and discussed  plan of care.

## 2021-06-09 NOTE — PROGRESS NOTE ADULT - PROBLEM SELECTOR PLAN 1
S/p Dexamethasone , No Remdesivir  due to positive antibodies   CXR results as above  Continue Bactrim for empiric coverage   Continue with prednisone taper  Continue inhalers  Continue ventilatory support.   continue SBT with PS during the day as tolerated.  Full vent support overnight  Monitor oxygen saturation. S/p Dexamethasone , No Remdesivir  due to positive antibodies   CXR results as above  Continue Bactrim for empiric coverage   Continue with prednisone taper  Continue inhalers  Continue ventilatory support.   continue SBT with PS during the day as tolerated. Tolerated 2 hrs of SBT with PS15  Full vent support overnight  Monitor oxygen saturation.

## 2021-06-09 NOTE — PROGRESS NOTE ADULT - PROBLEM SELECTOR PROBLEM 10
Advance care planning
Advance care planning
Discharge planning issues
Discharge planning issues
Advance care planning
Advance care planning
Discharge planning issues
Advance care planning

## 2021-06-09 NOTE — PROGRESS NOTE ADULT - SUBJECTIVE AND OBJECTIVE BOX
Pt is awake, alert, lying in bed in NAD. Trach/PEG. Left hand wound.     INTERVAL HPI/OVERNIGHT EVENTS:      VITAL SIGNS:  T(F): 97.6 (06-09-21 @ 06:36)  HR: 91 (06-09-21 @ 08:16)  BP: 162/90 (06-09-21 @ 06:36)  RR: 18 (06-09-21 @ 06:36)  SpO2: 100% (06-09-21 @ 08:16)  Wt(kg): --  I&O's Detail    Mode: PS (Pressure Support)/ Spontaneous  FiO2: 40  PEEP: 5  PS: 15  ITime: 1  MAP: 11  PC:   PIP: 21        REVIEW OF SYSTEMS:    CONSTITUTIONAL:  No fevers, chills, sweats    HEENT:  Eyes:  No diplopia or blurred vision. ENT:  No earache, sore throat or runny nose.    CARDIOVASCULAR:  No pressure, squeezing, tightness, or heaviness about the chest; no palpitations.    RESPIRATORY:  Per HPI    GASTROINTESTINAL:  No abdominal pain, nausea, vomiting or diarrhea.    GENITOURINARY:  No dysuria, frequency or urgency.    NEUROLOGIC:  No paresthesias, fasciculations, seizures or weakness.    PSYCHIATRIC:  No disorder of thought or mood.      PHYSICAL EXAM:    Constitutional: Well developed and nourished  Eyes:Perrla  ENMT: normal  Neck:supple  Respiratory: good air entry  Cardiovascular: S1 S2 regular  Gastrointestinal: Soft, Non tender  Extremities: LUE edema, left hand wound.   Vascular:normal  Neurological:Awake, alert,Ox3  Musculoskeletal: weak; bed.       MEDICATIONS  (STANDING):  ascorbic acid 1000 milliGRAM(s) Oral daily  BACItracin   Ointment 1 Application(s) Topical two times a day  calcium carbonate 1250 mG  + Vitamin D (OsCal 500 + D) 1 Tablet(s) Oral daily  chlorhexidine 2% Cloths 1 Application(s) Topical <User Schedule>  cholecalciferol 1000 Unit(s) Oral daily  clonazePAM  Tablet 0.5 milliGRAM(s) Oral every 12 hours  enoxaparin Injectable 40 milliGRAM(s) SubCutaneous every 24 hours  gabapentin 100 milliGRAM(s) Oral every 8 hours  labetalol 100 milliGRAM(s) Enteral Tube two times a day  pantoprazole  Injectable 40 milliGRAM(s) IV Push daily  potassium chloride   Powder 40 milliEquivalent(s) Enteral Tube once  predniSONE   Tablet 15 milliGRAM(s) Oral daily  QUEtiapine 50 milliGRAM(s) Oral two times a day  senna 2 Tablet(s) Oral at bedtime  trimethoprim  40 mG/sulfamethoxazole 200 mG Suspension 160 milliGRAM(s) Oral <User Schedule>    MEDICATIONS  (PRN):  acetaminophen    Suspension .. 650 milliGRAM(s) Enteral Tube every 12 hours PRN Temp greater or equal to 38C (100.4F), Mild Pain (1 - 3)  ALBUTerol    90 MICROgram(s) HFA Inhaler 2 Puff(s) Inhalation every 6 hours PRN Shortness of Breath and/or Wheezing  artificial  tears Solution 1 Drop(s) Both EYES every 4 hours PRN Dry Eyes  bisacodyl Suppository 10 milliGRAM(s) Rectal daily PRN Constipation  ondansetron Injectable 4 milliGRAM(s) IV Push every 6 hours PRN Nausea and/or Vomiting  polyethylene glycol 3350 17 Gram(s) Oral daily PRN Constipation  sodium chloride 0.9% lock flush 10 milliLiter(s) IV Push every 1 hour PRN Pre/post blood products, medications, blood draw, and to maintain line patency      Allergies    No Known Allergies    Intolerances        LABS:                        10.4   9.00  )-----------( 263      ( 09 Jun 2021 09:13 )             32.3     06-09    136  |  98  |  23<H>  ----------------------------<  117<H>  3.4<L>   |  30  |  0.73    Ca    9.4      09 Jun 2021 09:13  Phos  3.5     06-09  Mg     2.2     06-09    TPro  7.0  /  Alb  3.0<L>  /  TBili  0.5  /  DBili  x   /  AST  51<H>  /  ALT  139<H>  /  AlkPhos  92  06-09              CAPILLARY BLOOD GLUCOSE      POCT Blood Glucose.: 99 mg/dL (09 Jun 2021 06:03)  POCT Blood Glucose.: 97 mg/dL (08 Jun 2021 23:58)  POCT Blood Glucose.: 118 mg/dL (08 Jun 2021 21:21)  POCT Blood Glucose.: 145 mg/dL (08 Jun 2021 17:12)  POCT Blood Glucose.: 154 mg/dL (08 Jun 2021 11:44)        RADIOLOGY & ADDITIONAL TESTS:    CXR:  x  Ct scan chest:    ekg;    echo:

## 2021-06-09 NOTE — PROGRESS NOTE ADULT - SUBJECTIVE AND OBJECTIVE BOX
ARANZA CHAMBERS    SCU progress note    INTERVAL HPI/OVERNIGHT EVENTS: c/o constipation, dulcolax given, otherwise no new complaints    FULL CODE    Covid - 19 PCR: negative since 6/6    The 4Ms    What Matters Most: see [facility]  Age appropriate Medications/Screen for High Risk Medication: Yes  Mentation: see CAM below  Mobility: defer to physical exam    The Confusion Assessment Method (CAM) Diagnostic Algorithm     1: Acute Onset or Fluctuating Course  - Is there evidence of an acute change in mental status from the patient’s baseline? Did the (abnormal) behavior  fluctuate during the day, that is, tend to come and go, or increase and decrease in severity?  [ ] YES [x ] NO     2: Inattention  - Did the patient have difficulty focusing attention, being easily distractible, or having difficulty keeping track of what was being said?  [ ] YES [x ] NO     3: Disorganized thinking  -Was the patient’s thinking disorganized or incoherent, such as rambling or irrelevant conversation, unclear or illogical flow of ideas, or unpredictable switching from subject to subject?  [ ] YES [x ] NO    4: Altered Level of consciousness?  [ ] YES [x ] NO    The diagnosis of delirium by CAM requires the presence of features 1 and 2 and either 3 or 4.    PRESSORS: [ ] YES [ ] NO  trimethoprim  40 mG/sulfamethoxazole 200 mG Suspension 160 milliGRAM(s) Oral <User Schedule>    Cardiovascular:  Heart Failure  Acute   Acute on Chronic  Chronic       labetalol 100 milliGRAM(s) Enteral Tube two times a day    Pulmonary:  ALBUTerol    90 MICROgram(s) HFA Inhaler 2 Puff(s) Inhalation every 6 hours PRN    Hematalogic:  enoxaparin Injectable 40 milliGRAM(s) SubCutaneous every 24 hours    Other:  acetaminophen    Suspension .. 650 milliGRAM(s) Enteral Tube every 12 hours PRN  artificial  tears Solution 1 Drop(s) Both EYES every 4 hours PRN  ascorbic acid 1000 milliGRAM(s) Oral daily  BACItracin   Ointment 1 Application(s) Topical two times a day  bisacodyl Suppository 10 milliGRAM(s) Rectal daily PRN  calcium carbonate 1250 mG  + Vitamin D (OsCal 500 + D) 1 Tablet(s) Oral daily  chlorhexidine 2% Cloths 1 Application(s) Topical <User Schedule>  cholecalciferol 1000 Unit(s) Oral daily  clonazePAM  Tablet 0.5 milliGRAM(s) Oral every 12 hours  gabapentin 100 milliGRAM(s) Oral every 8 hours  ondansetron Injectable 4 milliGRAM(s) IV Push every 6 hours PRN  pantoprazole  Injectable 40 milliGRAM(s) IV Push daily  polyethylene glycol 3350 17 Gram(s) Oral daily PRN  potassium chloride   Powder 40 milliEquivalent(s) Enteral Tube once  predniSONE   Tablet 15 milliGRAM(s) Oral daily  QUEtiapine 50 milliGRAM(s) Oral two times a day  senna 2 Tablet(s) Oral at bedtime  sodium chloride 0.9% lock flush 10 milliLiter(s) IV Push every 1 hour PRN    acetaminophen    Suspension .. 650 milliGRAM(s) Enteral Tube every 12 hours PRN  ALBUTerol    90 MICROgram(s) HFA Inhaler 2 Puff(s) Inhalation every 6 hours PRN  artificial  tears Solution 1 Drop(s) Both EYES every 4 hours PRN  ascorbic acid 1000 milliGRAM(s) Oral daily  BACItracin   Ointment 1 Application(s) Topical two times a day  bisacodyl Suppository 10 milliGRAM(s) Rectal daily PRN  calcium carbonate 1250 mG  + Vitamin D (OsCal 500 + D) 1 Tablet(s) Oral daily  chlorhexidine 2% Cloths 1 Application(s) Topical <User Schedule>  cholecalciferol 1000 Unit(s) Oral daily  clonazePAM  Tablet 0.5 milliGRAM(s) Oral every 12 hours  enoxaparin Injectable 40 milliGRAM(s) SubCutaneous every 24 hours  gabapentin 100 milliGRAM(s) Oral every 8 hours  labetalol 100 milliGRAM(s) Enteral Tube two times a day  ondansetron Injectable 4 milliGRAM(s) IV Push every 6 hours PRN  pantoprazole  Injectable 40 milliGRAM(s) IV Push daily  polyethylene glycol 3350 17 Gram(s) Oral daily PRN  potassium chloride   Powder 40 milliEquivalent(s) Enteral Tube once  predniSONE   Tablet 15 milliGRAM(s) Oral daily  QUEtiapine 50 milliGRAM(s) Oral two times a day  senna 2 Tablet(s) Oral at bedtime  sodium chloride 0.9% lock flush 10 milliLiter(s) IV Push every 1 hour PRN  trimethoprim  40 mG/sulfamethoxazole 200 mG Suspension 160 milliGRAM(s) Oral <User Schedule>    Drug Dosing Weight  Height (cm): 167.6 (23 Apr 2021 23:15)  Weight (kg): 83.9 (23 Apr 2021 23:15)  BMI (kg/m2): 29.9 (23 Apr 2021 23:15)  BSA (m2): 1.93 (23 Apr 2021 23:15)    CENTRAL LINE: [ ] YES [ ] NO  LOCATION:   DATE INSERTED:  REMOVE: [ ] YES [ ] NO  EXPLAIN:    RIVERA: [ ] YES [ ] NO    DATE INSERTED:  REMOVE:  [ ] YES [ ] NO  EXPLAIN:    PAST MEDICAL & SURGICAL HISTORY:  No pertinent past medical history    S/P moody  1990s    S/P appendectomy  1990s    Mode: PS (Pressure Support)/ Spontaneous  FiO2: 40  PEEP: 5  PS: 15  ITime: 1  MAP: 11  PC:   PIP: 21    PHYSICAL EXAM:    GENERAL: NAD, well-groomed, well-developed  HEAD:  Atraumatic, Normocephalic  EYES: EOMI, PERRLA, conjunctiva and sclera clear  ENMT: Trach to vent, site CDI  NECK: Supple, No JVD, Normal thyroid  NERVOUS SYSTEM:  Alert & Oriented X3, Good concentration; Motor Strength 5/5 B/L upper and lower extremities; DTRs 2+ intact and symmetric  CHEST/LUNG: Clear to auscultate bilaterally; No rales, rhonchi, wheezing, or rubs  HEART: Regular rate and rhythm; No murmurs, rubs, or gallops  ABDOMEN: Soft, Nontender, Nondistended; Bowel sounds present, peg tube site CDI  EXTREMITIES:  2+ Peripheral Pulses, No clubbing, cyanosis, or edema  LYMPH: No lymphadenopathy noted  SKIN: L hand wound from IV site clear drainage       LABS:  CBC Full  -  ( 09 Jun 2021 09:13 )  WBC Count : 9.00 K/uL  RBC Count : 3.28 M/uL  Hemoglobin : 10.4 g/dL  Hematocrit : 32.3 %  Platelet Count - Automated : 263 K/uL  Mean Cell Volume : 98.5 fl  Mean Cell Hemoglobin : 31.7 pg  Mean Cell Hemoglobin Concentration : 32.2 gm/dL  Auto Neutrophil # : x  Auto Lymphocyte # : x  Auto Monocyte # : x  Auto Eosinophil # : x  Auto Basophil # : x  Auto Neutrophil % : x  Auto Lymphocyte % : x  Auto Monocyte % : x  Auto Eosinophil % : x  Auto Basophil % : x    06-09    136  |  98  |  23<H>  ----------------------------<  117<H>  3.4<L>   |  30  |  0.73    Ca    9.4      09 Jun 2021 09:13  Phos  3.5     06-09  Mg     2.2     06-09    TPro  7.0  /  Alb  3.0<L>  /  TBili  0.5  /  DBili  x   /  AST  51<H>  /  ALT  139<H>  /  AlkPhos  92  06-09    [ x ]  DVT Prophylaxis  [  ]  Nutrition, Vital @ 30ml/hr         Goal Rate 50ml    RADIOLOGY & ADDITIONAL STUDIES:  < from: Xray Chest 1 View- PORTABLE-Routine (Xray Chest 1 View- PORTABLE-Routine in AM.) (06.04.21 @ 09:37) >  EXAM:  XR CHEST PORTABLE ROUTINE 1V                            PROCEDURE DATE:  06/04/2021          INTERPRETATION:  Chest one view    HISTORY: Shortness of breath    COMPARISON STUDY: 6/1/2021    Frontal expiratory view of the chest shows the heart to be similar in size. Tracheostomy tube is again noted.    The lungs show slight clearing of patchy left infiltrates and there is no evidence of pneumothorax nor pleural effusion.    IMPRESSION:  Partial clearing, left lung.    Thank you for the courtesy of this referral.      STEFANO SALAS MD; Attending Interventional Radiologist  This document has been electronically signed. Jun 4 2021  3:59PM    < end of copied text >  < from: Xray Chest 1 View- PORTABLE-Urgent (Xray Chest 1 View- PORTABLE-Urgent .) (06.01.21 @ 14:29) >  EXAM:  XR CHEST PORTABLE URGENT 1V                            PROCEDURE DATE:  06/01/2021          INTERPRETATION:  AP erect chest on June 1, 2021 at 2:34 PM. Patient had removal of pigtail left chest tube.    Heart magnified by technique. Tracheostomy remains.    Left pigtail catheter seen earlier in the day has been removed.    Persistent amorphous infiltrate at the site is seen. No pneumothorax.    IMPRESSION: Uneventful removal of left chest tube.      CARMEN FARRELL M.D., ATTENDING RADIOLOGIST  This document has been electronically signed. Jun 1 2021  4:15PM    < end of copied text >      Goals of Care Discussion with Family/Proxy/Other   - see note from/family meeting set up for 4/14   ARANZA CHAMBERS    SCU progress note    INTERVAL HPI/OVERNIGHT EVENTS: c/o constipation, dulcolax given, + BM, otherwise no new complaints    FULL CODE    Covid - 19 PCR: negative since 6/6    The 4Ms    What Matters Most: see [facility]  Age appropriate Medications/Screen for High Risk Medication: Yes  Mentation: see CAM below  Mobility: defer to physical exam    The Confusion Assessment Method (CAM) Diagnostic Algorithm     1: Acute Onset or Fluctuating Course  - Is there evidence of an acute change in mental status from the patient’s baseline? Did the (abnormal) behavior  fluctuate during the day, that is, tend to come and go, or increase and decrease in severity?  [ ] YES [x ] NO     2: Inattention  - Did the patient have difficulty focusing attention, being easily distractible, or having difficulty keeping track of what was being said?  [ ] YES [x ] NO     3: Disorganized thinking  -Was the patient’s thinking disorganized or incoherent, such as rambling or irrelevant conversation, unclear or illogical flow of ideas, or unpredictable switching from subject to subject?  [ ] YES [x ] NO    4: Altered Level of consciousness?  [ ] YES [x ] NO    The diagnosis of delirium by CAM requires the presence of features 1 and 2 and either 3 or 4.    PRESSORS: [ ] YES [ ] NO  trimethoprim  40 mG/sulfamethoxazole 200 mG Suspension 160 milliGRAM(s) Oral <User Schedule>    Cardiovascular:  Heart Failure  Acute   Acute on Chronic  Chronic       labetalol 100 milliGRAM(s) Enteral Tube two times a day    Pulmonary:  ALBUTerol    90 MICROgram(s) HFA Inhaler 2 Puff(s) Inhalation every 6 hours PRN    Hematalogic:  enoxaparin Injectable 40 milliGRAM(s) SubCutaneous every 24 hours    Other:  acetaminophen    Suspension .. 650 milliGRAM(s) Enteral Tube every 12 hours PRN  artificial  tears Solution 1 Drop(s) Both EYES every 4 hours PRN  ascorbic acid 1000 milliGRAM(s) Oral daily  BACItracin   Ointment 1 Application(s) Topical two times a day  bisacodyl Suppository 10 milliGRAM(s) Rectal daily PRN  calcium carbonate 1250 mG  + Vitamin D (OsCal 500 + D) 1 Tablet(s) Oral daily  chlorhexidine 2% Cloths 1 Application(s) Topical <User Schedule>  cholecalciferol 1000 Unit(s) Oral daily  clonazePAM  Tablet 0.5 milliGRAM(s) Oral every 12 hours  gabapentin 100 milliGRAM(s) Oral every 8 hours  ondansetron Injectable 4 milliGRAM(s) IV Push every 6 hours PRN  pantoprazole  Injectable 40 milliGRAM(s) IV Push daily  polyethylene glycol 3350 17 Gram(s) Oral daily PRN  potassium chloride   Powder 40 milliEquivalent(s) Enteral Tube once  predniSONE   Tablet 15 milliGRAM(s) Oral daily  QUEtiapine 50 milliGRAM(s) Oral two times a day  senna 2 Tablet(s) Oral at bedtime  sodium chloride 0.9% lock flush 10 milliLiter(s) IV Push every 1 hour PRN    acetaminophen    Suspension .. 650 milliGRAM(s) Enteral Tube every 12 hours PRN  ALBUTerol    90 MICROgram(s) HFA Inhaler 2 Puff(s) Inhalation every 6 hours PRN  artificial  tears Solution 1 Drop(s) Both EYES every 4 hours PRN  ascorbic acid 1000 milliGRAM(s) Oral daily  BACItracin   Ointment 1 Application(s) Topical two times a day  bisacodyl Suppository 10 milliGRAM(s) Rectal daily PRN  calcium carbonate 1250 mG  + Vitamin D (OsCal 500 + D) 1 Tablet(s) Oral daily  chlorhexidine 2% Cloths 1 Application(s) Topical <User Schedule>  cholecalciferol 1000 Unit(s) Oral daily  clonazePAM  Tablet 0.5 milliGRAM(s) Oral every 12 hours  enoxaparin Injectable 40 milliGRAM(s) SubCutaneous every 24 hours  gabapentin 100 milliGRAM(s) Oral every 8 hours  labetalol 100 milliGRAM(s) Enteral Tube two times a day  ondansetron Injectable 4 milliGRAM(s) IV Push every 6 hours PRN  pantoprazole  Injectable 40 milliGRAM(s) IV Push daily  polyethylene glycol 3350 17 Gram(s) Oral daily PRN  potassium chloride   Powder 40 milliEquivalent(s) Enteral Tube once  predniSONE   Tablet 15 milliGRAM(s) Oral daily  QUEtiapine 50 milliGRAM(s) Oral two times a day  senna 2 Tablet(s) Oral at bedtime  sodium chloride 0.9% lock flush 10 milliLiter(s) IV Push every 1 hour PRN  trimethoprim  40 mG/sulfamethoxazole 200 mG Suspension 160 milliGRAM(s) Oral <User Schedule>    Drug Dosing Weight  Height (cm): 167.6 (23 Apr 2021 23:15)  Weight (kg): 83.9 (23 Apr 2021 23:15)  BMI (kg/m2): 29.9 (23 Apr 2021 23:15)  BSA (m2): 1.93 (23 Apr 2021 23:15)    CENTRAL LINE: [ ] YES [ ] NO  LOCATION:   DATE INSERTED:  REMOVE: [ ] YES [ ] NO  EXPLAIN:    RIVERA: [ ] YES [ ] NO    DATE INSERTED:  REMOVE:  [ ] YES [ ] NO  EXPLAIN:    PAST MEDICAL & SURGICAL HISTORY:  No pertinent past medical history    S/P moody  1990s    S/P appendectomy  1990s    Mode: PS (Pressure Support)/ Spontaneous  FiO2: 40  PEEP: 5  PS: 15  ITime: 1  MAP: 11  PC:   PIP: 21    PHYSICAL EXAM:    GENERAL: NAD, well-groomed, well-developed  HEAD:  Atraumatic, Normocephalic  EYES: EOMI, PERRLA, conjunctiva and sclera clear  ENMT: Trach to vent, site CDI  NECK: Supple, No JVD, Normal thyroid  NERVOUS SYSTEM:  Alert & Oriented X3, Good concentration; Motor Strength 5/5 B/L upper and lower extremities; DTRs 2+ intact and symmetric  CHEST/LUNG: Clear to auscultate bilaterally; No rales, rhonchi, wheezing, or rubs  HEART: Regular rate and rhythm; No murmurs, rubs, or gallops  ABDOMEN: Soft, Nontender, Nondistended; Bowel sounds present, peg tube site CDI  EXTREMITIES:  2+ Peripheral Pulses, No clubbing, cyanosis, or edema  LYMPH: No lymphadenopathy noted  SKIN: L hand wound from IV site clear drainage       LABS:  CBC Full  -  ( 09 Jun 2021 09:13 )  WBC Count : 9.00 K/uL  RBC Count : 3.28 M/uL  Hemoglobin : 10.4 g/dL  Hematocrit : 32.3 %  Platelet Count - Automated : 263 K/uL  Mean Cell Volume : 98.5 fl  Mean Cell Hemoglobin : 31.7 pg  Mean Cell Hemoglobin Concentration : 32.2 gm/dL  Auto Neutrophil # : x  Auto Lymphocyte # : x  Auto Monocyte # : x  Auto Eosinophil # : x  Auto Basophil # : x  Auto Neutrophil % : x  Auto Lymphocyte % : x  Auto Monocyte % : x  Auto Eosinophil % : x  Auto Basophil % : x    06-09    136  |  98  |  23<H>  ----------------------------<  117<H>  3.4<L>   |  30  |  0.73    Ca    9.4      09 Jun 2021 09:13  Phos  3.5     06-09  Mg     2.2     06-09    TPro  7.0  /  Alb  3.0<L>  /  TBili  0.5  /  DBili  x   /  AST  51<H>  /  ALT  139<H>  /  AlkPhos  92  06-09    [ x ]  DVT Prophylaxis  [  ]  Nutrition, Vital @ 30ml/hr         Goal Rate 50ml    RADIOLOGY & ADDITIONAL STUDIES:  < from: Xray Chest 1 View- PORTABLE-Routine (Xray Chest 1 View- PORTABLE-Routine in AM.) (06.04.21 @ 09:37) >  EXAM:  XR CHEST PORTABLE ROUTINE 1V                            PROCEDURE DATE:  06/04/2021          INTERPRETATION:  Chest one view    HISTORY: Shortness of breath    COMPARISON STUDY: 6/1/2021    Frontal expiratory view of the chest shows the heart to be similar in size. Tracheostomy tube is again noted.    The lungs show slight clearing of patchy left infiltrates and there is no evidence of pneumothorax nor pleural effusion.    IMPRESSION:  Partial clearing, left lung.    Thank you for the courtesy of this referral.      STEFANO SALAS MD; Attending Interventional Radiologist  This document has been electronically signed. Jun 4 2021  3:59PM    < end of copied text >  < from: Xray Chest 1 View- PORTABLE-Urgent (Xray Chest 1 View- PORTABLE-Urgent .) (06.01.21 @ 14:29) >  EXAM:  XR CHEST PORTABLE URGENT 1V                            PROCEDURE DATE:  06/01/2021          INTERPRETATION:  AP erect chest on June 1, 2021 at 2:34 PM. Patient had removal of pigtail left chest tube.    Heart magnified by technique. Tracheostomy remains.    Left pigtail catheter seen earlier in the day has been removed.    Persistent amorphous infiltrate at the site is seen. No pneumothorax.    IMPRESSION: Uneventful removal of left chest tube.      CARMEN FARRELL M.D., ATTENDING RADIOLOGIST  This document has been electronically signed. Jun 1 2021  4:15PM    < end of copied text >      Goals of Care Discussion with Family/Proxy/Other   - see note from/family meeting set up for 4/14

## 2021-06-09 NOTE — PROGRESS NOTE ADULT - PROBLEM SELECTOR PLAN 10
DVT and GI prophylaxis.  Will need vent facility.  SW aware. Awaiting authorization for [facility]    Continue daily SBT  Overall prognosis is guarded.

## 2021-06-09 NOTE — PROGRESS NOTE ADULT - ASSESSMENT
55M, no PMH, PSH appy and moody 1990s presented 3/14 with x9 days worsening cough, subjective fevers, and SOB, with x2-3 days dysuria and central, non-radiating, constant CP, admitted to [facility] for management of COVID PNA. Given discomfort of breathing, placed on 4L NC, with improvement. Labs notable for lymphopenia and neutrophilia despite WBC wnl, d-dimer 423, AST//114, lactate 3.3, and . Trop negative x1. +COVID. CXR notable for b/l infiltrates, L>R. Given x1 dose Tylenol and 1L IVF bolus in ED. Initially started on Remdesivir and Dexamethasone, Remdesivir was later discontinued secondary to positive antibodies and elevated LFT's. Not a  candidate for Tociluzimab due to elevated LFT. Patient continued to have respiratory distress requiring 15 L NRB support.    3/19/2021- Patient got transferred to ICU  due to increased work of breathing despite being on 15 L NRB . Patient was on HFNC to keep saturation >90%. Chest X ray showed Grossly stable mild left apical pneumothorax. Stable small pneumomediastinum. Soft tissue emphysema at the neck bases again noted. Stable patchy bilateral pulmonary infiltrates. Thoracic surgery consulted, no intervention at this time.  3/22/2021 - Chest X ray revealed trace ptx right and mild left pneumothorax  3/24/2021 - Overnight pt saturation remains in early 90s and late 80s . Pt remains on HFNC. CXR stable  3/25/2021- Chest X ray showed Overlying chin obscures lung apices. Questionable tiny left apical pneumothorax. Trial of semi- pulse steroid .. solumedrol 250 bid x 3 days   3/28- Started on solumedrol 40 mg bid  3/29/2021 - At 5.30 am, pt was in severe respiratory distress with tachypneic in 40s, tachycardic in 140s, saturating at high 70s, so decision was made to sedate and intubate the pt. Anesthesiologist intubated the patient. A central line, NGT  was placed  4/1/2021- Palliative care consulted. patient's brother/Idalgo ([phone]) has agreed to act as surrogate. Prednisone tapered to  once daily   4/6/2021- Left TLC placed, Hypertensive - s/p Lopressor 5 mg X 1  4/8/2021- Lasix daily and HCTz 50 mg added to aim for negative fluid balance, Left Chest tube placed, added Albumin (4/8-4/10)  4/11/2021-patient was having fever 100.8 rectal, s/p Tylenol , s/p 1 dose of vancomycin, on zosyn - New infiltrate on CXR ,likely developing VAP  4/13/2021- Continue to spike fevers, Zosyn switched to Meropenem. ID- Dr Anand following, new TLC placed  4/15/2021- Chest tuber discontinued   4/18/2021- BP running on lower side, Started on phenylephrine, Ibuprofen D/Connor , FiO2 increased to 70%Patient had large pneumothorax note don Left, thoracic surgery was consulted ,  left chest tube placed.  4/20/2021- Patient received 1 dose of Epifanio for vent asynchrony, A line placed, vent settings changed fro mPC to CVVC     4/21/2021- Trach placed  4/23/2021- PEG tube placed  4/25/2021 - cxr 4/25/21 with A 40% LEFT pneumothorax. LEFT multi-sidehole pigtail catheter overlies LEFT lower hemithorax.. Bilateral multifocal and diffuse ill-defined airspace opacities..  Follow-up AP portable chest radiograph 4/25/2021 AT 8:58 AM: Residual LEFT 30% upper zone pneumothorax.  4/26/2021- Patient noted to have hemoglobin drop to 6.8 from 7.5, will transfuse one unit of PRBC and monitor CBC.   4/27/2021- Patient's hemoglobin dropped again to 7, will give two unit of PRBC  4/28/2021- On 4/28 Hb 6.8, s/p 1 unit of PRBC hb 7.5 now. 4 PRBC total  CTH and CT abdomen w/o contrast no evidence of bleed  No active signs of bleeding (No hematemesis, melena or hematuria)  F/u hemolysis w/u- negative so far, elevated reticulocytes . Dr Andrade consulted  5/2/2021-Patients Hb remained stable, was tachypneic and breathing over the vent so 1 mg push of Dilaudid X 2 was given. V: 20/400/60%/3+ on precedex drip at 1.5.   5/3/2021- patient was found to have large gastric bubble, G tube was connected to wall suction, repeat CXR showed improvement.  5/5/2021 - Hemoglobin dropped from 8.8 to 7.4.Will add bactrim empirically as patient is on chronic prednisone for organizing pneumonia. Tube feeds were held because of diarrhea  5/6/2021- CT chest was done, which showed mild PTX. Left 3rd intercostal space Chest tube was placed. Patient was hypotensive overnight. Cristiano temperature. Was septic, will send cultures if spikes temperature again. Patient was retaining urine, andrea was placed overnight. Will start on albumin and give one dose of lasix as patient was hypotensive and edematous in upper extremities, also had low albumin. Patient was getting agitated, will give ketamine IV push and monitor if it helps.  5/10/2021- Pt tachycardic overnight and hypertensive to 228/116 w/p 1x lopressor. Stopped levophed. Pt became hypotensive. Then started phenylephrine. BP now stable. Will d/c methadone and seroquel and continue on precedex for sedation. CXR showing apical pneumothorax   5/13/2021- Pt tachycardic to 160-170 yesterday afternoon and night. Pupils dilated. Did not respond to increased doses of opiates and benzos. Developed fever to 102.3 overnight with episode of vomiting. UA was positive. started empirically on vanc/zosyn - will d/c for now as no clear source of infection. AM abdominal xray w/ likely ileus. Will restart Free water. Hold Lasix. CT abdomen done showing ileus and possible fecal matter in rectum. Fecal disimpaction performed, small ball of stool retrieved with liquid stool excreted subsequently. Spoke to Surgery RUBI Wood. Tube feeds stopped and PEG tube placed to suction with no output.. Also will restart Precedex as likely in withdrawal and clonid  5/18/2021- Patient was tachy and agitated. Later he had hypotension and bradycardia. He was given 1L bolus and was restarted on pheny after which he improved  5/25- Behavioral health consulted due to difficultyn weaning off sedation . Recommended C/w Klonopin to 1 mg q8h standing (no PRNs), with plan to further taper as tolerated. C/w Seroquel 50 mg BID standing  5/26/2021- BP was elevated, was started on labetalol. d/c andrea.  CT placed on water seal, c/w water seal  5/27/2021- Vomiting X2, held feeding, CT Abdomen/Pelvis ordered  5/28/2021- Placed on trach collar today  5/30/2021- Patient was on trach collar throughout the day yesterday and placed back on vent overnight. No other acute events overnight.    5/31/2021- Decreased Methadone to 5 mg daily and Klonopin to 0.5 mg every 8 hours. Plan was made to transfer patient to SCU  6/1 Abd discomfort. AXR results as above, no bowel obstruction. Resume PEG feedings.   6/1 TC trial. Pt tolerated only 10 minutes, RR ~30's and anxious.  Will attempt SBT with PS.   6/3  Only tolerated 10 minutes of BSBT with PS 5. Became diaphoretic and c/o SOB   6/5  Tolerated 9 hours SBT yesterday and 2.5 hours today with PS 15  6/8 Tolerated 9 hrs of SBT PS 15      55M, no PMH, PSH appy and moody 1990s presented 3/14 with x9 days worsening cough, subjective fevers, and SOB, with x2-3 days dysuria and central, non-radiating, constant CP, admitted to [facility] for management of COVID PNA. Given discomfort of breathing, placed on 4L NC, with improvement. Labs notable for lymphopenia and neutrophilia despite WBC wnl, d-dimer 423, AST//114, lactate 3.3, and . Trop negative x1. +COVID. CXR notable for b/l infiltrates, L>R. Given x1 dose Tylenol and 1L IVF bolus in ED. Initially started on Remdesivir and Dexamethasone, Remdesivir was later discontinued secondary to positive antibodies and elevated LFT's. Not a  candidate for Tociluzimab due to elevated LFT. Patient continued to have respiratory distress requiring 15 L NRB support.    3/19/2021- Patient got transferred to ICU  due to increased work of breathing despite being on 15 L NRB . Patient was on HFNC to keep saturation >90%. Chest X ray showed Grossly stable mild left apical pneumothorax. Stable small pneumomediastinum. Soft tissue emphysema at the neck bases again noted. Stable patchy bilateral pulmonary infiltrates. Thoracic surgery consulted, no intervention at this time.  3/22/2021 - Chest X ray revealed trace ptx right and mild left pneumothorax  3/24/2021 - Overnight pt saturation remains in early 90s and late 80s . Pt remains on HFNC. CXR stable  3/25/2021- Chest X ray showed Overlying chin obscures lung apices. Questionable tiny left apical pneumothorax. Trial of semi- pulse steroid .. solumedrol 250 bid x 3 days   3/28- Started on solumedrol 40 mg bid  3/29/2021 - At 5.30 am, pt was in severe respiratory distress with tachypneic in 40s, tachycardic in 140s, saturating at high 70s, so decision was made to sedate and intubate the pt. Anesthesiologist intubated the patient. A central line, NGT  was placed  4/1/2021- Palliative care consulted. patient's brother/Idalgo ([phone]) has agreed to act as surrogate. Prednisone tapered to  once daily   4/6/2021- Left TLC placed, Hypertensive - s/p Lopressor 5 mg X 1  4/8/2021- Lasix daily and HCTz 50 mg added to aim for negative fluid balance, Left Chest tube placed, added Albumin (4/8-4/10)  4/11/2021-patient was having fever 100.8 rectal, s/p Tylenol , s/p 1 dose of vancomycin, on zosyn - New infiltrate on CXR ,likely developing VAP  4/13/2021- Continue to spike fevers, Zosyn switched to Meropenem. ID- Dr Anand following, new TLC placed  4/15/2021- Chest tuber discontinued   4/18/2021- BP running on lower side, Started on phenylephrine, Ibuprofen D/Connor , FiO2 increased to 70%Patient had large pneumothorax note don Left, thoracic surgery was consulted ,  left chest tube placed.  4/20/2021- Patient received 1 dose of Epifanio for vent asynchrony, A line placed, vent settings changed fro mPC to CVVC     4/21/2021- Trach placed  4/23/2021- PEG tube placed  4/25/2021 - cxr 4/25/21 with A 40% LEFT pneumothorax. LEFT multi-sidehole pigtail catheter overlies LEFT lower hemithorax.. Bilateral multifocal and diffuse ill-defined airspace opacities..  Follow-up AP portable chest radiograph 4/25/2021 AT 8:58 AM: Residual LEFT 30% upper zone pneumothorax.  4/26/2021- Patient noted to have hemoglobin drop to 6.8 from 7.5, will transfuse one unit of PRBC and monitor CBC.   4/27/2021- Patient's hemoglobin dropped again to 7, will give two unit of PRBC  4/28/2021- On 4/28 Hb 6.8, s/p 1 unit of PRBC hb 7.5 now. 4 PRBC total  CTH and CT abdomen w/o contrast no evidence of bleed  No active signs of bleeding (No hematemesis, melena or hematuria)  F/u hemolysis w/u- negative so far, elevated reticulocytes . Dr Andrade consulted  5/2/2021-Patients Hb remained stable, was tachypneic and breathing over the vent so 1 mg push of Dilaudid X 2 was given. V: 20/400/60%/3+ on precedex drip at 1.5.   5/3/2021- patient was found to have large gastric bubble, G tube was connected to wall suction, repeat CXR showed improvement.  5/5/2021 - Hemoglobin dropped from 8.8 to 7.4.Will add bactrim empirically as patient is on chronic prednisone for organizing pneumonia. Tube feeds were held because of diarrhea  5/6/2021- CT chest was done, which showed mild PTX. Left 3rd intercostal space Chest tube was placed. Patient was hypotensive overnight. Cristiano temperature. Was septic, will send cultures if spikes temperature again. Patient was retaining urine, andrea was placed overnight. Will start on albumin and give one dose of lasix as patient was hypotensive and edematous in upper extremities, also had low albumin. Patient was getting agitated, will give ketamine IV push and monitor if it helps.  5/10/2021- Pt tachycardic overnight and hypertensive to 228/116 w/p 1x lopressor. Stopped levophed. Pt became hypotensive. Then started phenylephrine. BP now stable. Will d/c methadone and seroquel and continue on precedex for sedation. CXR showing apical pneumothorax   5/13/2021- Pt tachycardic to 160-170 yesterday afternoon and night. Pupils dilated. Did not respond to increased doses of opiates and benzos. Developed fever to 102.3 overnight with episode of vomiting. UA was positive. started empirically on vanc/zosyn - will d/c for now as no clear source of infection. AM abdominal xray w/ likely ileus. Will restart Free water. Hold Lasix. CT abdomen done showing ileus and possible fecal matter in rectum. Fecal disimpaction performed, small ball of stool retrieved with liquid stool excreted subsequently. Spoke to Surgery RUBI Wood. Tube feeds stopped and PEG tube placed to suction with no output.. Also will restart Precedex as likely in withdrawal and clonid  5/18/2021- Patient was tachy and agitated. Later he had hypotension and bradycardia. He was given 1L bolus and was restarted on pheny after which he improved  5/25- Behavioral health consulted due to difficultyn weaning off sedation . Recommended C/w Klonopin to 1 mg q8h standing (no PRNs), with plan to further taper as tolerated. C/w Seroquel 50 mg BID standing  5/26/2021- BP was elevated, was started on labetalol. d/c andrea.  CT placed on water seal, c/w water seal  5/27/2021- Vomiting X2, held feeding, CT Abdomen/Pelvis ordered  5/28/2021- Placed on trach collar today  5/30/2021- Patient was on trach collar throughout the day yesterday and placed back on vent overnight. No other acute events overnight.    5/31/2021- Decreased Methadone to 5 mg daily and Klonopin to 0.5 mg every 8 hours. Plan was made to transfer patient to SCU  6/1 Abd discomfort. AXR results as above, no bowel obstruction. Resume PEG feedings.   6/1 TC trial. Pt tolerated only 10 minutes, RR ~30's and anxious.  Will attempt SBT with PS.   6/3  Only tolerated 10 minutes of BSBT with PS 5. Became diaphoretic and c/o SOB   6/5  Tolerated 9 hours SBT yesterday and 2.5 hours today with PS 15  6/8 Tolerated 9 hrs of SBT PS 15   6/9 Tolerated 2 hrs of SBT with PS15, pt became diaphoretics and sob

## 2021-06-09 NOTE — PROGRESS NOTE ADULT - SUBJECTIVE AND OBJECTIVE BOX
Patient is a 55y old  Male who presents with a chief complaint of SOB (08 Jun 2021 10:47)    pt seen in icu [  ], reg med floor scu [ x  ], bed [ x ], chair at bedside [   ], awake and responsive [ x ], mildly sedated, [  ],    nad [x  ]        Allergies    No Known Allergies        Vitals    T(F): 98.4 (06-08-21 @ 20:57), Max: 98.4 (06-08-21 @ 20:57)  HR: 93 (06-09-21 @ 04:10) (79 - 98)  BP: 139/78 (06-08-21 @ 20:57) (134/84 - 139/78)  RR: 20 (06-08-21 @ 20:57) (17 - 23)  SpO2: 100% (06-09-21 @ 04:10) (98% - 100%)  Wt(kg): --  CAPILLARY BLOOD GLUCOSE      POCT Blood Glucose.: 99 mg/dL (09 Jun 2021 06:03)      Labs                          10.3   8.55  )-----------( 241      ( 07 Jun 2021 11:03 )             31.9       06-07    136  |  99  |  27<H>  ----------------------------<  116<H>  3.9   |  29  |  0.80    Ca    9.0      07 Jun 2021 11:03  Phos  3.1     06-07  Mg     2.2     06-07    TPro  7.1  /  Alb  3.2<L>  /  TBili  0.4  /  DBili  x   /  AST  45<H>  /  ALT  94<H>  /  AlkPhos  91  06-07            .Sputum Sputum  04-16 @ 04:42   Moderate Enterobacter aerogenes (Carbapenem Resistant)  Normal Respiratory Monserrat present  --  Enterobacter aerogenes (Carbapenem Resistant)      .Urine Clean Catch (Midstream)  03-15 @ 00:52   No growth  --  --          Radiology Results      Meds    MEDICATIONS  (STANDING):  ascorbic acid 1000 milliGRAM(s) Oral daily  BACItracin   Ointment 1 Application(s) Topical two times a day  calcium carbonate 1250 mG  + Vitamin D (OsCal 500 + D) 1 Tablet(s) Oral daily  chlorhexidine 2% Cloths 1 Application(s) Topical <User Schedule>  cholecalciferol 1000 Unit(s) Oral daily  clonazePAM  Tablet 0.5 milliGRAM(s) Oral every 12 hours  enoxaparin Injectable 40 milliGRAM(s) SubCutaneous every 24 hours  gabapentin 100 milliGRAM(s) Oral every 8 hours  labetalol 100 milliGRAM(s) Enteral Tube two times a day  pantoprazole  Injectable 40 milliGRAM(s) IV Push daily  predniSONE   Tablet 15 milliGRAM(s) Oral daily  QUEtiapine 50 milliGRAM(s) Oral two times a day  senna 2 Tablet(s) Oral at bedtime  trimethoprim  40 mG/sulfamethoxazole 200 mG Suspension 160 milliGRAM(s) Oral <User Schedule>      MEDICATIONS  (PRN):  acetaminophen    Suspension .. 650 milliGRAM(s) Enteral Tube every 12 hours PRN Temp greater or equal to 38C (100.4F), Mild Pain (1 - 3)  ALBUTerol    90 MICROgram(s) HFA Inhaler 2 Puff(s) Inhalation every 6 hours PRN Shortness of Breath and/or Wheezing  artificial  tears Solution 1 Drop(s) Both EYES every 4 hours PRN Dry Eyes  bisacodyl Suppository 10 milliGRAM(s) Rectal daily PRN Constipation  ondansetron Injectable 4 milliGRAM(s) IV Push every 6 hours PRN Nausea and/or Vomiting  polyethylene glycol 3350 17 Gram(s) Oral daily PRN Constipation  sodium chloride 0.9% lock flush 10 milliLiter(s) IV Push every 1 hour PRN Pre/post blood products, medications, blood draw, and to maintain line patency      Physical Exam    Neuro :  no focal deficits  Respiratory: CTA B/L  CV: RRR, S1S2, no murmurs,   Abdominal: Soft, NT, ND +BS, gastrostomy tube inplace  Extremities: edema of extrem, + peripheral pulses      ASSESSMENT      Hypoxemia 2nd to covid pna   transaminitis  prediabetes  h/o appendectomy  cholecystectomy        PLAN    cont cont precautions  covid 5/14/21 neg noted above  d/c remdesevir given covid ab positive noted   completed dexamethasone   started pulse steroids for 3 days - 250mg solumedrol bid now tapered off 4/14/21   prednisone 20 daily d/c 6/1/21   cont on bactrim empirically, TIW   cont asa, vit c,    cont albuterol inhaler   pulm f/u  procalcitonin, D-dimer, crp, ldh, ferritin, lactate noted ,    cont tylenol prn,   cont robitussin prn   pt off precedex    pt on methadone   pain mgmt eval noted   off phenylephrine drip for hypotension  clonidine held 2nd to low bp  s/p intubation 3/29/21   s/p tracheostomy 4/21/21  O2 sat  99% (97% - 100%) mv 40%  O2 via mech vent   cont wean as tolerated   pt tolerated 9hrs of only pressure support 6/4/21  vent mgmt as per scu  thoracic surg f/u   s/p L chest tube replacement 4/18/21 for recurrence of left pneumothorax   cxr 4/20/21 with Unchanged advanced infiltrates and catheter left chest tube noted   CXR 4/11/21 with : No interval change compared to one day prior. No pneumothorax noted.   unable to flush or aspirate tube fully, noted debris in tubing which was milked out.   Once material removed from tubing able to aspirated and flush fully. Also changed dressing on pigtail which appears to be in good position.   Monitor O2 status   s/p tracheostomy 4/21/21   cxr 4/21/21 with No evidence of active chest disease. Tracheostomy tube in place otherwise no significant change noted   cxr 4/25/21 with A 40% LEFT pneumothorax. LEFT multi-sidehole pigtail catheter overlies LEFT lower hemithorax.. Bilateral multifocal and diffuse ill-defined airspace opacities..  Follow-up [facility] portable chest radiograph 4/25/2021 AT 8:58 AM: Residual LEFT 30% upper zone pneumothorax. Otherwise no interval change.noted    s/p 2nd chest tube 5/5/21  cxr 5/6/21 with Tracheostomy and catheter left chest tubes remain. , There are significant diffuse advanced infiltrates again noted. No pneumothorax. Above findings are similar to study earlier in the day. Present film shows a left jugular line inserted   with tip entering the superior vena cava noted   cxr 5/30/21 with Tracheostomy cannula left chest catheter reidentified in position. No change small simple left apical pneumothorax. No change bilateral airspace opacities. Trace left pleural effusion suggested noted   cxr 5/31/21 with Status post tracheostomy. Status post left chest pigtail catheter. Trace left pneumothorax is unchanged. Cardiomegaly. Moderate to severe multifocal patchy opacificationof both lungs, left greater than right, is unchanged. Nonobstructive bowel gas pattern. No dilated loops of small or large bowel noted   s/p removal of L pigtail.   CXR 6/1/21 post l pig tail removal with Left pigtail catheter seen earlier in the day has been removed. Persistent amorphous infiltrate at the site is seen. No pneumothorax noted above.  No acute thoracic surgical intervention at this time,  s/p surgical placement of gastrostomy tube 4/23/2021.   abd xray 5/10/21 with ileus noted   dressing changed daily for seroma   abd xray 5/21/21 with Decreased bowel ileus compared to prior 5/20/2021 exam noted above.   abd xray 5/22/21 with No dilated loops of bowel noted above.   cont tube feeding   CT scan of the chest 5/13/21 demonstrates diffuse bilateral infiltrates with a region of consolidation at the left lung base. Small left pneumothorax. 2 left chest tubes noted in place noted above.  CT scan of the abdomen and pelvis 5/13/21 demonstrates diffuse dilatation of small and large bowel loops most consistent with ileus noted   xray abd with  5/14/21 with Ingested contrast within nonobstructed large bowel to the level of rectum. No acute radiographic intra-abdominal findings noted   ct abd-pelv 5/27/21 with Delayed nephrographic phase enhancement of the nonobstructed bilateral kidneys, suggestive of acute renal insufficiency. No evidence of bowel obstruction. Proctitis noted above.   id f/u   patient completed course of Meropenem  leukocytosis resolved  sputum cx with Enterobacter aerogenes (Carbapenem Resistant) noted above   afebrile  Completed  meropenem, s/p 1 dose of Vancomycin   completed zosyn  lispro ss   s/p 4 units prbc for anemia  f/u h/h    transfuse prbc as needed  heme onc f/u  Haptoglobin normal  no hemolysis, Fe/B12/folate adequate  hemolysis is unlikely even his baseline haptoglobin may be very elevated due to COVID  direct pamella neg noted    psych f/u/  C/w Klonopin to 1 mg q8h standing (no PRNs), with plan to further taper as tolerated  cont Seroquel to 50 mg BID standing starting tonight  Continue delirium precautions: Frequent reorientation, familiar pictures and objects at bedside, natural light in daytime,   consistent sleep/wake schedule, adequate hydration and nutrition, sensory aids (hearing aids, glasses) present if needed, minimizing noise and overstimulation,   clustering care to minimize overnight interruptions, judicious use of deliriogenic medications (anticholinergics, benzodiazepines and opioid analgesics), minimize use of restraints  cont current meds   d/c planning for vent facility   mgmt as per scu           Patient is a 55y old  Male who presents with a chief complaint of SOB (08 Jun 2021 10:47)    pt seen in icu [  ], reg med floor scu [ x  ], bed [ x ], chair at bedside [   ], awake and responsive [ x ], mildly sedated, [  ],    nad [x  ]        Allergies    No Known Allergies        Vitals    T(F): 98.4 (06-08-21 @ 20:57), Max: 98.4 (06-08-21 @ 20:57)  HR: 93 (06-09-21 @ 04:10) (79 - 98)  BP: 139/78 (06-08-21 @ 20:57) (134/84 - 139/78)  RR: 20 (06-08-21 @ 20:57) (17 - 23)  SpO2: 100% (06-09-21 @ 04:10) (98% - 100%)  Wt(kg): --  CAPILLARY BLOOD GLUCOSE      POCT Blood Glucose.: 99 mg/dL (09 Jun 2021 06:03)      Labs                          10.3   8.55  )-----------( 241      ( 07 Jun 2021 11:03 )             31.9       06-07    136  |  99  |  27<H>  ----------------------------<  116<H>  3.9   |  29  |  0.80    Ca    9.0      07 Jun 2021 11:03  Phos  3.1     06-07  Mg     2.2     06-07    TPro  7.1  /  Alb  3.2<L>  /  TBili  0.4  /  DBili  x   /  AST  45<H>  /  ALT  94<H>  /  AlkPhos  91  06-07            .Sputum Sputum  04-16 @ 04:42   Moderate Enterobacter aerogenes (Carbapenem Resistant)  Normal Respiratory Monserrat present  --  Enterobacter aerogenes (Carbapenem Resistant)      .Urine Clean Catch (Midstream)  03-15 @ 00:52   No growth  --  --          Radiology Results      Meds    MEDICATIONS  (STANDING):  ascorbic acid 1000 milliGRAM(s) Oral daily  BACItracin   Ointment 1 Application(s) Topical two times a day  calcium carbonate 1250 mG  + Vitamin D (OsCal 500 + D) 1 Tablet(s) Oral daily  chlorhexidine 2% Cloths 1 Application(s) Topical <User Schedule>  cholecalciferol 1000 Unit(s) Oral daily  clonazePAM  Tablet 0.5 milliGRAM(s) Oral every 12 hours  enoxaparin Injectable 40 milliGRAM(s) SubCutaneous every 24 hours  gabapentin 100 milliGRAM(s) Oral every 8 hours  labetalol 100 milliGRAM(s) Enteral Tube two times a day  pantoprazole  Injectable 40 milliGRAM(s) IV Push daily  predniSONE   Tablet 15 milliGRAM(s) Oral daily  QUEtiapine 50 milliGRAM(s) Oral two times a day  senna 2 Tablet(s) Oral at bedtime  trimethoprim  40 mG/sulfamethoxazole 200 mG Suspension 160 milliGRAM(s) Oral <User Schedule>      MEDICATIONS  (PRN):  acetaminophen    Suspension .. 650 milliGRAM(s) Enteral Tube every 12 hours PRN Temp greater or equal to 38C (100.4F), Mild Pain (1 - 3)  ALBUTerol    90 MICROgram(s) HFA Inhaler 2 Puff(s) Inhalation every 6 hours PRN Shortness of Breath and/or Wheezing  artificial  tears Solution 1 Drop(s) Both EYES every 4 hours PRN Dry Eyes  bisacodyl Suppository 10 milliGRAM(s) Rectal daily PRN Constipation  ondansetron Injectable 4 milliGRAM(s) IV Push every 6 hours PRN Nausea and/or Vomiting  polyethylene glycol 3350 17 Gram(s) Oral daily PRN Constipation  sodium chloride 0.9% lock flush 10 milliLiter(s) IV Push every 1 hour PRN Pre/post blood products, medications, blood draw, and to maintain line patency      Physical Exam    Neuro :  no focal deficits  Respiratory: CTA B/L  CV: RRR, S1S2, no murmurs,   Abdominal: Soft, NT, ND +BS, gastrostomy tube inplace  Extremities: edema of extrem, + peripheral pulses      ASSESSMENT      Hypoxemia 2nd to covid pna   transaminitis  prediabetes  h/o appendectomy  cholecystectomy        PLAN    cont cont precautions  covid 5/14/21 neg noted above  d/c remdesevir given covid ab positive noted   completed dexamethasone   started pulse steroids for 3 days - 250mg solumedrol bid now tapered off 4/14/21   prednisone 20 daily d/c 6/1/21   cont on bactrim empirically, TIW   cont asa, vit c,    cont albuterol inhaler   pulm f/u  procalcitonin, D-dimer, crp, ldh, ferritin, lactate noted ,    cont tylenol prn,   cont robitussin prn   pt off precedex    pt on methadone   pain mgmt eval noted   off phenylephrine drip for hypotension  clonidine held 2nd to low bp  s/p intubation 3/29/21   s/p tracheostomy 4/21/21  O2 sat 100% (98% - 100%) mv 40%  O2 via mech vent   cont wean as tolerated   pt tolerated 9hrs of only pressure support 6/4/21  vent mgmt as per scu  thoracic surg f/u   s/p L chest tube replacement 4/18/21 for recurrence of left pneumothorax   cxr 4/20/21 with Unchanged advanced infiltrates and catheter left chest tube noted   CXR 4/11/21 with : No interval change compared to one day prior. No pneumothorax noted.   unable to flush or aspirate tube fully, noted debris in tubing which was milked out.   Once material removed from tubing able to aspirated and flush fully. Also changed dressing on pigtail which appears to be in good position.   Monitor O2 status   s/p tracheostomy 4/21/21   cxr 4/21/21 with No evidence of active chest disease. Tracheostomy tube in place otherwise no significant change noted   cxr 4/25/21 with A 40% LEFT pneumothorax. LEFT multi-sidehole pigtail catheter overlies LEFT lower hemithorax.. Bilateral multifocal and diffuse ill-defined airspace opacities..  Follow-up [facility] portable chest radiograph 4/25/2021 AT 8:58 AM: Residual LEFT 30% upper zone pneumothorax. Otherwise no interval change.noted    s/p 2nd chest tube 5/5/21  cxr 5/6/21 with Tracheostomy and catheter left chest tubes remain. , There are significant diffuse advanced infiltrates again noted. No pneumothorax. Above findings are similar to study earlier in the day. Present film shows a left jugular line inserted   with tip entering the superior vena cava noted   cxr 5/30/21 with Tracheostomy cannula left chest catheter reidentified in position. No change small simple left apical pneumothorax. No change bilateral airspace opacities. Trace left pleural effusion suggested noted   cxr 5/31/21 with Status post tracheostomy. Status post left chest pigtail catheter. Trace left pneumothorax is unchanged. Cardiomegaly. Moderate to severe multifocal patchy opacificationof both lungs, left greater than right, is unchanged. Nonobstructive bowel gas pattern. No dilated loops of small or large bowel noted   s/p removal of L pigtail.   CXR 6/1/21 post l pig tail removal with Left pigtail catheter seen earlier in the day has been removed. Persistent amorphous infiltrate at the site is seen. No pneumothorax noted above.  No acute thoracic surgical intervention at this time,  s/p surgical placement of gastrostomy tube 4/23/2021.   abd xray 5/10/21 with ileus noted   dressing changed daily for seroma   abd xray 5/21/21 with Decreased bowel ileus compared to prior 5/20/2021 exam noted above.   abd xray 5/22/21 with No dilated loops of bowel noted above.   cont tube feeding   CT scan of the chest 5/13/21 demonstrates diffuse bilateral infiltrates with a region of consolidation at the left lung base. Small left pneumothorax. 2 left chest tubes noted in place noted above.  CT scan of the abdomen and pelvis 5/13/21 demonstrates diffuse dilatation of small and large bowel loops most consistent with ileus noted   xray abd with  5/14/21 with Ingested contrast within nonobstructed large bowel to the level of rectum. No acute radiographic intra-abdominal findings noted   ct abd-pelv 5/27/21 with Delayed nephrographic phase enhancement of the nonobstructed bilateral kidneys, suggestive of acute renal insufficiency. No evidence of bowel obstruction. Proctitis noted above.   id f/u   patient completed course of Meropenem  leukocytosis resolved  sputum cx with Enterobacter aerogenes (Carbapenem Resistant) noted above   afebrile  Completed  meropenem, s/p 1 dose of Vancomycin   completed zosyn  lispro ss   s/p 4 units prbc for anemia  f/u h/h    transfuse prbc as needed  heme onc f/u  Haptoglobin normal  no hemolysis, Fe/B12/folate adequate  hemolysis is unlikely even his baseline haptoglobin may be very elevated due to COVID  direct pamella neg noted    psych f/u/  C/w Klonopin to 1 mg q8h standing (no PRNs), with plan to further taper as tolerated  cont Seroquel to 50 mg BID standing starting tonight  Continue delirium precautions: Frequent reorientation, familiar pictures and objects at bedside, natural light in daytime,   consistent sleep/wake schedule, adequate hydration and nutrition, sensory aids (hearing aids, glasses) present if needed, minimizing noise and overstimulation,   clustering care to minimize overnight interruptions, judicious use of deliriogenic medications (anticholinergics, benzodiazepines and opioid analgesics), minimize use of restraints  cont current meds   [facility]  mgmt as per scu

## 2021-06-10 LAB
GLUCOSE BLDC GLUCOMTR-MCNC: 108 MG/DL — HIGH (ref 70–99)
GLUCOSE BLDC GLUCOMTR-MCNC: 112 MG/DL — HIGH (ref 70–99)
GLUCOSE BLDC GLUCOMTR-MCNC: 134 MG/DL — HIGH (ref 70–99)
GLUCOSE BLDC GLUCOMTR-MCNC: 144 MG/DL — HIGH (ref 70–99)

## 2021-06-10 RX ORDER — CLONAZEPAM 1 MG
0.5 TABLET ORAL EVERY 12 HOURS
Refills: 0 | Status: DISCONTINUED | OUTPATIENT
Start: 2021-06-10 | End: 2021-06-11

## 2021-06-10 RX ADMIN — QUETIAPINE FUMARATE 50 MILLIGRAM(S): 200 TABLET, FILM COATED ORAL at 21:37

## 2021-06-10 RX ADMIN — Medication 1000 UNIT(S): at 11:45

## 2021-06-10 RX ADMIN — GABAPENTIN 100 MILLIGRAM(S): 400 CAPSULE ORAL at 05:46

## 2021-06-10 RX ADMIN — Medication 0.5 MILLIGRAM(S): at 18:23

## 2021-06-10 RX ADMIN — Medication 1000 MILLIGRAM(S): at 13:28

## 2021-06-10 RX ADMIN — Medication 15 MILLIGRAM(S): at 05:46

## 2021-06-10 RX ADMIN — PANTOPRAZOLE SODIUM 40 MILLIGRAM(S): 20 TABLET, DELAYED RELEASE ORAL at 11:46

## 2021-06-10 RX ADMIN — GABAPENTIN 100 MILLIGRAM(S): 400 CAPSULE ORAL at 13:30

## 2021-06-10 RX ADMIN — Medication 1 APPLICATION(S): at 05:18

## 2021-06-10 RX ADMIN — CHLORHEXIDINE GLUCONATE 1 APPLICATION(S): 213 SOLUTION TOPICAL at 05:18

## 2021-06-10 RX ADMIN — Medication 100 MILLIGRAM(S): at 05:46

## 2021-06-10 RX ADMIN — Medication 100 MILLIGRAM(S): at 18:23

## 2021-06-10 RX ADMIN — QUETIAPINE FUMARATE 50 MILLIGRAM(S): 200 TABLET, FILM COATED ORAL at 10:02

## 2021-06-10 RX ADMIN — Medication 1 APPLICATION(S): at 19:32

## 2021-06-10 RX ADMIN — Medication 1 TABLET(S): at 11:47

## 2021-06-10 RX ADMIN — GABAPENTIN 100 MILLIGRAM(S): 400 CAPSULE ORAL at 21:37

## 2021-06-10 RX ADMIN — SENNA PLUS 2 TABLET(S): 8.6 TABLET ORAL at 21:37

## 2021-06-10 RX ADMIN — ENOXAPARIN SODIUM 40 MILLIGRAM(S): 100 INJECTION SUBCUTANEOUS at 11:45

## 2021-06-10 NOTE — PROGRESS NOTE ADULT - ATTENDING COMMENTS
Problem/Plan - 1:  ·  Problem: Acute hypoxemic respiratory failure due to COVID-19.  Plan: S/p Dexamethasone , No Remdesivir  due to positive antibodies   CXR results as above  Continue Bactrim for empiric coverage   Continue with prednisone taper  Continue inhalers  Continue ventilatory support.   continue SBT with PS during the day as tolerated. Tolerated three hours today with PS15  Full vent support overnight  Monitor oxygen saturation.      Problem/Plan - 2:  ·  Problem: Acute respiratory distress syndrome (ARDS) due to COVID-19 virus.  Plan: Slowly improving.  CXR improved.  Continue mechanical ventilation SBT as tolerated.  Serial CXRs  Monitor oxygen saturation.       Discharge to [facility] facility pending insurance auth

## 2021-06-10 NOTE — PROGRESS NOTE ADULT - SUBJECTIVE AND OBJECTIVE BOX
Pt is awake, alert, lying in bed. Tolerated 3hr today. Trach/PEG in place    INTERVAL HPI/OVERNIGHT EVENTS:      VITAL SIGNS:  T(F): 98 (06-10-21 @ 05:30)  HR: 86 (06-10-21 @ 09:29)  BP: 161/87 (06-10-21 @ 05:30)  RR: 19 (06-10-21 @ 05:30)  SpO2: 100% (06-10-21 @ 09:29)  Wt(kg): --  I&O's Detail    09 Jun 2021 07:01  -  10 Nestor 2021 07:00  --------------------------------------------------------  IN:    Vital1.5: 600 mL  Total IN: 600 mL    OUT:  Total OUT: 0 mL    Total NET: 600 mL        Mode: CPAP with PS  FiO2: 40  PEEP: 5  PS: 15  ITime: 1  MAP: 9  PIP: 21        REVIEW OF SYSTEMS:    CONSTITUTIONAL:  No fevers, chills, sweats    HEENT:  Eyes:  No diplopia or blurred vision. ENT:  No earache, sore throat or runny nose.    CARDIOVASCULAR:  No pressure, squeezing, tightness, or heaviness about the chest; no palpitations.    RESPIRATORY:  Per HPI    GASTROINTESTINAL:  No abdominal pain, nausea, vomiting or diarrhea.    GENITOURINARY:  No dysuria, frequency or urgency.    NEUROLOGIC:  No paresthesias, fasciculations, seizures or weakness.    PSYCHIATRIC:  No disorder of thought or mood.      PHYSICAL EXAM:    Constitutional: Well developed and nourished  Eyes:Perrla  ENMT: normal  Neck:supple  Respiratory: good air entry; trach   Cardiovascular: S1 S2 regular  Gastrointestinal: Soft, Non tender; PEG   Extremities: No edema  Vascular:normal  Neurological:Awake, alert,Ox3  Musculoskeletal:Normal      MEDICATIONS  (STANDING):  ascorbic acid 1000 milliGRAM(s) Oral daily  BACItracin   Ointment 1 Application(s) Topical two times a day  calcium carbonate 1250 mG  + Vitamin D (OsCal 500 + D) 1 Tablet(s) Oral daily  chlorhexidine 2% Cloths 1 Application(s) Topical <User Schedule>  cholecalciferol 1000 Unit(s) Oral daily  clonazePAM  Tablet 0.5 milliGRAM(s) Oral every 12 hours  enoxaparin Injectable 40 milliGRAM(s) SubCutaneous every 24 hours  gabapentin 100 milliGRAM(s) Oral every 8 hours  labetalol 100 milliGRAM(s) Enteral Tube two times a day  pantoprazole  Injectable 40 milliGRAM(s) IV Push daily  predniSONE   Tablet 15 milliGRAM(s) Oral daily  QUEtiapine 50 milliGRAM(s) Oral two times a day  senna 2 Tablet(s) Oral at bedtime  trimethoprim  40 mG/sulfamethoxazole 200 mG Suspension 160 milliGRAM(s) Oral <User Schedule>    MEDICATIONS  (PRN):  acetaminophen    Suspension .. 650 milliGRAM(s) Enteral Tube every 12 hours PRN Temp greater or equal to 38C (100.4F), Mild Pain (1 - 3)  ALBUTerol    90 MICROgram(s) HFA Inhaler 2 Puff(s) Inhalation every 6 hours PRN Shortness of Breath and/or Wheezing  artificial  tears Solution 1 Drop(s) Both EYES every 4 hours PRN Dry Eyes  bisacodyl Suppository 10 milliGRAM(s) Rectal daily PRN Constipation  ondansetron Injectable 4 milliGRAM(s) IV Push every 6 hours PRN Nausea and/or Vomiting  polyethylene glycol 3350 17 Gram(s) Oral daily PRN Constipation  sodium chloride 0.9% lock flush 10 milliLiter(s) IV Push every 1 hour PRN Pre/post blood products, medications, blood draw, and to maintain line patency      Allergies    No Known Allergies    Intolerances        LABS:                        10.4   9.00  )-----------( 263      ( 09 Jun 2021 09:13 )             32.3     06-09    136  |  98  |  23<H>  ----------------------------<  117<H>  3.4<L>   |  30  |  0.73    Ca    9.4      09 Jun 2021 09:13  Phos  3.5     06-09  Mg     2.2     06-09    TPro  7.0  /  Alb  3.0<L>  /  TBili  0.5  /  DBili  x   /  AST  51<H>  /  ALT  139<H>  /  AlkPhos  92  06-09      CAPILLARY BLOOD GLUCOSE    POCT Blood Glucose.: 144 mg/dL (10 Nestor 2021 11:48)  POCT Blood Glucose.: 112 mg/dL (10 Nestor 2021 05:38)  POCT Blood Glucose.: 133 mg/dL (09 Jun 2021 23:30)  POCT Blood Glucose.: 115 mg/dL (09 Jun 2021 16:40)        RADIOLOGY & ADDITIONAL TESTS:    CXR:    Ct scan chest:    ekg;    echo:

## 2021-06-10 NOTE — PROGRESS NOTE ADULT - PROBLEM SELECTOR PLAN 1
S/p Dexamethasone , No Remdesivir  due to positive antibodies   CXR results as above  Continue Bactrim for empiric coverage   Continue with prednisone taper  Continue inhalers  Continue ventilatory support.   continue SBT with PS during the day as tolerated.  Full vent support overnight  Monitor oxygen saturation. S/p Dexamethasone , No Remdesivir  due to positive antibodies   CXR results as above  Continue Bactrim for empiric coverage   Continue with prednisone taper  Continue inhalers  Continue ventilatory support.   continue SBT with PS during the day as tolerated. Tolerated three hours today with PS15  Full vent support overnight  Monitor oxygen saturation.

## 2021-06-10 NOTE — PHARMACOTHERAPY INTERVENTION NOTE - COMMENTS
Expiring controlled substance order verified, clonazepam 0.5mg PO q12h reordered
Recommended changing frequency of fentanyl patch from Q48hrs to Q72hrs.
Recommended discontinuation of naloxegol since the patient is no longer on methadone.
Recommended ordering a vancomycin trough prior to the administration of the 4th dose
Recommended renewing the  order for clonazepam, if medically necessary
50 ml every 6 hours for 333 days changed to 48 hours
Recommended switching pantoprazole from IV to Suspension because patient can take medication via NG tube.
Recommended the discontinuation for 1 of 2 sodium chloride lock flush active orders.
Informed physician that pharmacy does carry Movantik 12.5mg if the dose needs to be decreased due to vomiting, as it is a known side effect of this medication.
Recommended switching from tamsulosin to doxazosin due to the NG tube and tamsulosin cannot be opened or crushed.
Recommended obtaining ID consult because patient is currently onmeropenem.

## 2021-06-10 NOTE — PHARMACOTHERAPY INTERVENTION NOTE - INTERVENTION TYPE RECOOMEND
Duration of Therapy Recommended
Therapy duplication
Duration of Therapy Recommended
Dose Optimization/Non-renal Dose Adjustment
Therapy Discontinuation Recommended - No indication
Therapy Recommended - Alternative treatment
Therapy Recommended - Drug indicated but not ordered
Timing/Frequency of Administration Recommended
IV to PO

## 2021-06-10 NOTE — PROGRESS NOTE ADULT - ASSESSMENT
55M, no PMH, PSH appy and moody 1990s presented 3/14 with x9 days worsening cough, subjective fevers, and SOB, with x2-3 days dysuria and central, non-radiating, constant CP, admitted to [facility] for management of COVID PNA. Given discomfort of breathing, placed on 4L NC, with improvement. Labs notable for lymphopenia and neutrophilia despite WBC wnl, d-dimer 423, AST//114, lactate 3.3, and . Trop negative x1. +COVID. CXR notable for b/l infiltrates, L>R. Given x1 dose Tylenol and 1L IVF bolus in ED. Initially started on Remdesivir and Dexamethasone, Remdesivir was later discontinued secondary to positive antibodies and elevated LFT's. Not a  candidate for Tociluzimab due to elevated LFT. Patient continued to have respiratory distress requiring 15 L NRB support.    3/19/2021- Patient got transferred to ICU  due to increased work of breathing despite being on 15 L NRB . Patient was on HFNC to keep saturation >90%. Chest X ray showed Grossly stable mild left apical pneumothorax. Stable small pneumomediastinum. Soft tissue emphysema at the neck bases again noted. Stable patchy bilateral pulmonary infiltrates. Thoracic surgery consulted, no intervention at this time.  3/22/2021 - Chest X ray revealed trace ptx right and mild left pneumothorax  3/24/2021 - Overnight pt saturation remains in early 90s and late 80s . Pt remains on HFNC. CXR stable  3/25/2021- Chest X ray showed Overlying chin obscures lung apices. Questionable tiny left apical pneumothorax. Trial of semi- pulse steroid .. solumedrol 250 bid x 3 days   3/28- Started on solumedrol 40 mg bid  3/29/2021 - At 5.30 am, pt was in severe respiratory distress with tachypneic in 40s, tachycardic in 140s, saturating at high 70s, so decision was made to sedate and intubate the pt. Anesthesiologist intubated the patient. A central line, NGT  was placed  4/1/2021- Palliative care consulted. patient's brother/Idalgo ([phone]) has agreed to act as surrogate. Prednisone tapered to  once daily   4/6/2021- Left TLC placed, Hypertensive - s/p Lopressor 5 mg X 1  4/8/2021- Lasix daily and HCTz 50 mg added to aim for negative fluid balance, Left Chest tube placed, added Albumin (4/8-4/10)  4/11/2021-patient was having fever 100.8 rectal, s/p Tylenol , s/p 1 dose of vancomycin, on zosyn - New infiltrate on CXR ,likely developing VAP  4/13/2021- Continue to spike fevers, Zosyn switched to Meropenem. ID- Dr Anand following, new TLC placed  4/15/2021- Chest tuber discontinued   4/18/2021- BP running on lower side, Started on phenylephrine, Ibuprofen D/Connor , FiO2 increased to 70%Patient had large pneumothorax note don Left, thoracic surgery was consulted ,  left chest tube placed.  4/20/2021- Patient received 1 dose of Epifanio for vent asynchrony, A line placed, vent settings changed fro mPC to CVVC     4/21/2021- Trach placed  4/23/2021- PEG tube placed  4/25/2021 - cxr 4/25/21 with A 40% LEFT pneumothorax. LEFT multi-sidehole pigtail catheter overlies LEFT lower hemithorax.. Bilateral multifocal and diffuse ill-defined airspace opacities..  Follow-up AP portable chest radiograph 4/25/2021 AT 8:58 AM: Residual LEFT 30% upper zone pneumothorax.  4/26/2021- Patient noted to have hemoglobin drop to 6.8 from 7.5, will transfuse one unit of PRBC and monitor CBC.   4/27/2021- Patient's hemoglobin dropped again to 7, will give two unit of PRBC  4/28/2021- On 4/28 Hb 6.8, s/p 1 unit of PRBC hb 7.5 now. 4 PRBC total  CTH and CT abdomen w/o contrast no evidence of bleed  No active signs of bleeding (No hematemesis, melena or hematuria)  F/u hemolysis w/u- negative so far, elevated reticulocytes . Dr Andrade consulted  5/2/2021-Patients Hb remained stable, was tachypneic and breathing over the vent so 1 mg push of Dilaudid X 2 was given. V: 20/400/60%/3+ on precedex drip at 1.5.   5/3/2021- patient was found to have large gastric bubble, G tube was connected to wall suction, repeat CXR showed improvement.  5/5/2021 - Hemoglobin dropped from 8.8 to 7.4.Will add bactrim empirically as patient is on chronic prednisone for organizing pneumonia. Tube feeds were held because of diarrhea  5/6/2021- CT chest was done, which showed mild PTX. Left 3rd intercostal space Chest tube was placed. Patient was hypotensive overnight. Cristiano temperature. Was septic, will send cultures if spikes temperature again. Patient was retaining urine, andrea was placed overnight. Will start on albumin and give one dose of lasix as patient was hypotensive and edematous in upper extremities, also had low albumin. Patient was getting agitated, will give ketamine IV push and monitor if it helps.  5/10/2021- Pt tachycardic overnight and hypertensive to 228/116 w/p 1x lopressor. Stopped levophed. Pt became hypotensive. Then started phenylephrine. BP now stable. Will d/c methadone and seroquel and continue on precedex for sedation. CXR showing apical pneumothorax   5/13/2021- Pt tachycardic to 160-170 yesterday afternoon and night. Pupils dilated. Did not respond to increased doses of opiates and benzos. Developed fever to 102.3 overnight with episode of vomiting. UA was positive. started empirically on vanc/zosyn - will d/c for now as no clear source of infection. AM abdominal xray w/ likely ileus. Will restart Free water. Hold Lasix. CT abdomen done showing ileus and possible fecal matter in rectum. Fecal disimpaction performed, small ball of stool retrieved with liquid stool excreted subsequently. Spoke to Surgery RUBI Wood. Tube feeds stopped and PEG tube placed to suction with no output.. Also will restart Precedex as likely in withdrawal and clonid  5/18/2021- Patient was tachy and agitated. Later he had hypotension and bradycardia. He was given 1L bolus and was restarted on pheny after which he improved  5/25- Behavioral health consulted due to difficultyn weaning off sedation . Recommended C/w Klonopin to 1 mg q8h standing (no PRNs), with plan to further taper as tolerated. C/w Seroquel 50 mg BID standing  5/26/2021- BP was elevated, was started on labetalol. d/c andrea.  CT placed on water seal, c/w water seal  5/27/2021- Vomiting X2, held feeding, CT Abdomen/Pelvis ordered  5/28/2021- Placed on trach collar today  5/30/2021- Patient was on trach collar throughout the day yesterday and placed back on vent overnight. No other acute events overnight.    5/31/2021- Decreased Methadone to 5 mg daily and Klonopin to 0.5 mg every 8 hours. Plan was made to transfer patient to SCU  6/1 Abd discomfort. AXR results as above, no bowel obstruction. Resume PEG feedings.   6/1 TC trial. Pt tolerated only 10 minutes, RR ~30's and anxious.  Will attempt SBT with PS.   6/3  Only tolerated 10 minutes of BSBT with PS 5. Became diaphoretic and c/o SOB   6/5  Tolerated 9 hours SBT yesterday and 2.5 hours today with PS 15  6/8 Tolerated 9 hrs of SBT PS 15   6/9 Tolerated 2 hrs of SBT with PS15, pt became diaphoretics and sob     55M, no PMH, PSH appy and moody 1990s presented 3/14 with x9 days worsening cough, subjective fevers, and SOB, with x2-3 days dysuria and central, non-radiating, constant CP, admitted to [facility] for management of COVID PNA. Given discomfort of breathing, placed on 4L NC, with improvement. Labs notable for lymphopenia and neutrophilia despite WBC wnl, d-dimer 423, AST//114, lactate 3.3, and . Trop negative x1. +COVID. CXR notable for b/l infiltrates, L>R. Given x1 dose Tylenol and 1L IVF bolus in ED. Initially started on Remdesivir and Dexamethasone, Remdesivir was later discontinued secondary to positive antibodies and elevated LFT's. Not a  candidate for Tociluzimab due to elevated LFT. Patient continued to have respiratory distress requiring 15 L NRB support.    3/19/2021- Patient got transferred to ICU  due to increased work of breathing despite being on 15 L NRB . Patient was on HFNC to keep saturation >90%. Chest X ray showed Grossly stable mild left apical pneumothorax. Stable small pneumomediastinum. Soft tissue emphysema at the neck bases again noted. Stable patchy bilateral pulmonary infiltrates. Thoracic surgery consulted, no intervention at this time.  3/22/2021 - Chest X ray revealed trace ptx right and mild left pneumothorax  3/24/2021 - Overnight pt saturation remains in early 90s and late 80s . Pt remains on HFNC. CXR stable  3/25/2021- Chest X ray showed Overlying chin obscures lung apices. Questionable tiny left apical pneumothorax. Trial of semi- pulse steroid .. solumedrol 250 bid x 3 days   3/28- Started on solumedrol 40 mg bid  3/29/2021 - At 5.30 am, pt was in severe respiratory distress with tachypneic in 40s, tachycardic in 140s, saturating at high 70s, so decision was made to sedate and intubate the pt. Anesthesiologist intubated the patient. A central line, NGT  was placed  4/1/2021- Palliative care consulted. patient's brother/Idalgo ([phone]) has agreed to act as surrogate. Prednisone tapered to  once daily   4/6/2021- Left TLC placed, Hypertensive - s/p Lopressor 5 mg X 1  4/8/2021- Lasix daily and HCTz 50 mg added to aim for negative fluid balance, Left Chest tube placed, added Albumin (4/8-4/10)  4/11/2021-patient was having fever 100.8 rectal, s/p Tylenol , s/p 1 dose of vancomycin, on zosyn - New infiltrate on CXR ,likely developing VAP  4/13/2021- Continue to spike fevers, Zosyn switched to Meropenem. ID- Dr Anand following, new TLC placed  4/15/2021- Chest tuber discontinued   4/18/2021- BP running on lower side, Started on phenylephrine, Ibuprofen D/Connor , FiO2 increased to 70%Patient had large pneumothorax note don Left, thoracic surgery was consulted ,  left chest tube placed.  4/20/2021- Patient received 1 dose of Epifanio for vent asynchrony, A line placed, vent settings changed fro mPC to CVVC     4/21/2021- Trach placed  4/23/2021- PEG tube placed  4/25/2021 - cxr 4/25/21 with A 40% LEFT pneumothorax. LEFT multi-sidehole pigtail catheter overlies LEFT lower hemithorax.. Bilateral multifocal and diffuse ill-defined airspace opacities..  Follow-up AP portable chest radiograph 4/25/2021 AT 8:58 AM: Residual LEFT 30% upper zone pneumothorax.  4/26/2021- Patient noted to have hemoglobin drop to 6.8 from 7.5, will transfuse one unit of PRBC and monitor CBC.   4/27/2021- Patient's hemoglobin dropped again to 7, will give two unit of PRBC  4/28/2021- On 4/28 Hb 6.8, s/p 1 unit of PRBC hb 7.5 now. 4 PRBC total  CTH and CT abdomen w/o contrast no evidence of bleed  No active signs of bleeding (No hematemesis, melena or hematuria)  F/u hemolysis w/u- negative so far, elevated reticulocytes . Dr Andrade consulted  5/2/2021-Patients Hb remained stable, was tachypneic and breathing over the vent so 1 mg push of Dilaudid X 2 was given. V: 20/400/60%/3+ on precedex drip at 1.5.   5/3/2021- patient was found to have large gastric bubble, G tube was connected to wall suction, repeat CXR showed improvement.  5/5/2021 - Hemoglobin dropped from 8.8 to 7.4.Will add bactrim empirically as patient is on chronic prednisone for organizing pneumonia. Tube feeds were held because of diarrhea  5/6/2021- CT chest was done, which showed mild PTX. Left 3rd intercostal space Chest tube was placed. Patient was hypotensive overnight. Cristiano temperature. Was septic, will send cultures if spikes temperature again. Patient was retaining urine, andrea was placed overnight. Will start on albumin and give one dose of lasix as patient was hypotensive and edematous in upper extremities, also had low albumin. Patient was getting agitated, will give ketamine IV push and monitor if it helps.  5/10/2021- Pt tachycardic overnight and hypertensive to 228/116 w/p 1x lopressor. Stopped levophed. Pt became hypotensive. Then started phenylephrine. BP now stable. Will d/c methadone and seroquel and continue on precedex for sedation. CXR showing apical pneumothorax   5/13/2021- Pt tachycardic to 160-170 yesterday afternoon and night. Pupils dilated. Did not respond to increased doses of opiates and benzos. Developed fever to 102.3 overnight with episode of vomiting. UA was positive. started empirically on vanc/zosyn - will d/c for now as no clear source of infection. AM abdominal xray w/ likely ileus. Will restart Free water. Hold Lasix. CT abdomen done showing ileus and possible fecal matter in rectum. Fecal disimpaction performed, small ball of stool retrieved with liquid stool excreted subsequently. Spoke to Surgery RUBI Wood. Tube feeds stopped and PEG tube placed to suction with no output.. Also will restart Precedex as likely in withdrawal and clonid  5/18/2021- Patient was tachy and agitated. Later he had hypotension and bradycardia. He was given 1L bolus and was restarted on pheny after which he improved  5/25- Behavioral health consulted due to difficultyn weaning off sedation . Recommended C/w Klonopin to 1 mg q8h standing (no PRNs), with plan to further taper as tolerated. C/w Seroquel 50 mg BID standing  5/26/2021- BP was elevated, was started on labetalol. d/c andrea.  CT placed on water seal, c/w water seal  5/27/2021- Vomiting X2, held feeding, CT Abdomen/Pelvis ordered  5/28/2021- Placed on trach collar today  5/30/2021- Patient was on trach collar throughout the day yesterday and placed back on vent overnight. No other acute events overnight.    5/31/2021- Decreased Methadone to 5 mg daily and Klonopin to 0.5 mg every 8 hours. Plan was made to transfer patient to SCU  6/1 Abd discomfort. AXR results as above, no bowel obstruction. Resume PEG feedings.   6/1 TC trial. Pt tolerated only 10 minutes, RR ~30's and anxious.  Will attempt SBT with PS.   6/3  Only tolerated 10 minutes of BSBT with PS 5. Became diaphoretic and c/o SOB   6/5  Tolerated 9 hours SBT yesterday and 2.5 hours today with PS 15  6/8 Tolerated 9 hrs of SBT PS 15   6/9 Tolerated 2 hrs of SBT with PS15, pt became diaphoretics and sob  6/10 Tolerated for 3 hours with PS of 15

## 2021-06-10 NOTE — PROGRESS NOTE ADULT - SUBJECTIVE AND OBJECTIVE BOX
ARANZA CHAMBERS    SCU progress note    INTERVAL HPI/OVERNIGHT EVENTS: no new events overnight, denies discomfort. Pt in better mood today    FULL CODE    Covid - 19 PCR: negative since 6/9  The 4Ms    What Matters Most: see GO  Age appropriate Medications/Screen for High Risk Medication: Yes  Mentation: see CAM below  Mobility: defer to physical exam    The Confusion Assessment Method (CAM) Diagnostic Algorithm     1: Acute Onset or Fluctuating Course  - Is there evidence of an acute change in mental status from the patient’s baseline? Did the (abnormal) behavior  fluctuate during the day, that is, tend to come and go, or increase and decrease in severity?  [ ] YES [ x] NO     2: Inattention  - Did the patient have difficulty focusing attention, being easily distractible, or having difficulty keeping track of what was being said?  [ ] YES [x ] NO     3: Disorganized thinking  -Was the patient’s thinking disorganized or incoherent, such as rambling or irrelevant conversation, unclear or illogical flow of ideas, or unpredictable switching from subject to subject?  [ ] YES [ x] NO    4: Altered Level of consciousness?  [ ] YES [x ] NO    The diagnosis of delirium by CAM requires the presence of features 1 and 2 and either 3 or 4.    PRESSORS: [ ] YES [ ] NO  trimethoprim  40 mG/sulfamethoxazole 200 mG Suspension 160 milliGRAM(s) Oral <User Schedule>    Cardiovascular:  Heart Failure  Acute   Acute on Chronic  Chronic       labetalol 100 milliGRAM(s) Enteral Tube two times a day    Pulmonary:  ALBUTerol    90 MICROgram(s) HFA Inhaler 2 Puff(s) Inhalation every 6 hours PRN    Hematalogic:  enoxaparin Injectable 40 milliGRAM(s) SubCutaneous every 24 hours    Other:  acetaminophen    Suspension .. 650 milliGRAM(s) Enteral Tube every 12 hours PRN  artificial  tears Solution 1 Drop(s) Both EYES every 4 hours PRN  ascorbic acid 1000 milliGRAM(s) Oral daily  BACItracin   Ointment 1 Application(s) Topical two times a day  bisacodyl Suppository 10 milliGRAM(s) Rectal daily PRN  calcium carbonate 1250 mG  + Vitamin D (OsCal 500 + D) 1 Tablet(s) Oral daily  chlorhexidine 2% Cloths 1 Application(s) Topical <User Schedule>  cholecalciferol 1000 Unit(s) Oral daily  clonazePAM  Tablet 0.5 milliGRAM(s) Oral every 12 hours  gabapentin 100 milliGRAM(s) Oral every 8 hours  ondansetron Injectable 4 milliGRAM(s) IV Push every 6 hours PRN  pantoprazole  Injectable 40 milliGRAM(s) IV Push daily  polyethylene glycol 3350 17 Gram(s) Oral daily PRN  predniSONE   Tablet 15 milliGRAM(s) Oral daily  QUEtiapine 50 milliGRAM(s) Oral two times a day  senna 2 Tablet(s) Oral at bedtime  sodium chloride 0.9% lock flush 10 milliLiter(s) IV Push every 1 hour PRN    acetaminophen    Suspension .. 650 milliGRAM(s) Enteral Tube every 12 hours PRN  ALBUTerol    90 MICROgram(s) HFA Inhaler 2 Puff(s) Inhalation every 6 hours PRN  artificial  tears Solution 1 Drop(s) Both EYES every 4 hours PRN  ascorbic acid 1000 milliGRAM(s) Oral daily  BACItracin   Ointment 1 Application(s) Topical two times a day  bisacodyl Suppository 10 milliGRAM(s) Rectal daily PRN  calcium carbonate 1250 mG  + Vitamin D (OsCal 500 + D) 1 Tablet(s) Oral daily  chlorhexidine 2% Cloths 1 Application(s) Topical <User Schedule>  cholecalciferol 1000 Unit(s) Oral daily  clonazePAM  Tablet 0.5 milliGRAM(s) Oral every 12 hours  enoxaparin Injectable 40 milliGRAM(s) SubCutaneous every 24 hours  gabapentin 100 milliGRAM(s) Oral every 8 hours  labetalol 100 milliGRAM(s) Enteral Tube two times a day  ondansetron Injectable 4 milliGRAM(s) IV Push every 6 hours PRN  pantoprazole  Injectable 40 milliGRAM(s) IV Push daily  polyethylene glycol 3350 17 Gram(s) Oral daily PRN  predniSONE   Tablet 15 milliGRAM(s) Oral daily  QUEtiapine 50 milliGRAM(s) Oral two times a day  senna 2 Tablet(s) Oral at bedtime  sodium chloride 0.9% lock flush 10 milliLiter(s) IV Push every 1 hour PRN  trimethoprim  40 mG/sulfamethoxazole 200 mG Suspension 160 milliGRAM(s) Oral <User Schedule>    Drug Dosing Weight  Height (cm): 167.6 (23 Apr 2021 23:15)  Weight (kg): 83.9 (23 Apr 2021 23:15)  BMI (kg/m2): 29.9 (23 Apr 2021 23:15)  BSA (m2): 1.93 (23 Apr 2021 23:15)    CENTRAL LINE: [ ] YES [ ] NO  LOCATION:   DATE INSERTED:  REMOVE: [ ] YES [ ] NO  EXPLAIN:    RIVERA: [ ] YES [ ] NO    DATE INSERTED:  REMOVE:  [ ] YES [ ] NO  EXPLAIN:    PAST MEDICAL & SURGICAL HISTORY:  No pertinent past medical history    S/P moody  1990s    S/P appendectomy  1990s 06-09 @ 07:01  -  06-10 @ 07:00  --------------------------------------------------------  IN: 600 mL / OUT: 0 mL / NET: 600 mL    Mode: AC/ CMV (Assist Control/ Continuous Mandatory Ventilation)  RR (machine): 18  TV (machine): 450  FiO2: 40  PEEP: 5  ITime: 0.7  MAP: 8  PIP: 20    PHYSICAL EXAM:    GENERAL: NAD, well-groomed, well-developed  HEAD:  Atraumatic, Normocephalic  EYES: EOMI, PERRLA, conjunctiva and sclera clear  ENMT: Trach site with dressing CDI  NECK: Supple, No JVD, Normal thyroid  NERVOUS SYSTEM:  Alert & Oriented , Good concentration; Motor Strength 5/5 B/L upper and lower extremities; DTRs 2+ intact and symmetric  CHEST/LUNG: Clear to percussion bilaterally; No rales, rhonchi, wheezing, or rubs  HEART: Regular rate and rhythm; No murmurs, rubs, or gallops  ABDOMEN: Soft, Nontender, Nondistended; Bowel sounds present, peg tube site CDI  EXTREMITIES:  2+ Peripheral Pulses, No clubbing, cyanosis, or edema  LYMPH: No lymphadenopathy noted  SKIN: L top of hand with IV site wound oozing cleat drianage      LABS:  CBC Full  -  ( 09 Jun 2021 09:13 )  WBC Count : 9.00 K/uL  RBC Count : 3.28 M/uL  Hemoglobin : 10.4 g/dL  Hematocrit : 32.3 %  Platelet Count - Automated : 263 K/uL  Mean Cell Volume : 98.5 fl  Mean Cell Hemoglobin : 31.7 pg  Mean Cell Hemoglobin Concentration : 32.2 gm/dL  Auto Neutrophil # : x  Auto Lymphocyte # : x  Auto Monocyte # : x  Auto Eosinophil # : x  Auto Basophil # : x  Auto Neutrophil % : x  Auto Lymphocyte % : x  Auto Monocyte % : x  Auto Eosinophil % : x  Auto Basophil % : x    06-09    136  |  98  |  23<H>  ----------------------------<  117<H>  3.4<L>   |  30  |  0.73    Ca    9.4      09 Jun 2021 09:13  Phos  3.5     06-09  Mg     2.2     06-09    TPro  7.0  /  Alb  3.0<L>  /  TBili  0.5  /  DBili  x   /  AST  51<H>  /  ALT  139<H>  /  AlkPhos  92  06-09    [x  ]  DVT Prophylaxis  [  ]  Nutrition, Vital at 30ml/hr         Goal Rate 50 ml/hr        Malnutrition      RADIOLOGY & ADDITIONAL STUDIES:  < from: Xray Chest 1 View- PORTABLE-Routine (Xray Chest 1 View- PORTABLE-Routine in AM.) (06.04.21 @ 09:37) >  EXAM:  XR CHEST PORTABLE ROUTINE 1V                            PROCEDURE DATE:  06/04/2021          INTERPRETATION:  Chest one view    HISTORY: Shortness of breath    COMPARISON STUDY: 6/1/2021    Frontal expiratory view of the chest shows the heart to be similar in size. Tracheostomy tube is again noted.    The lungs show slight clearing of patchy left infiltrates and there is no evidence of pneumothorax nor pleural effusion.    IMPRESSION:  Partial clearing, left lung.    Thank you for the courtesy of this referral.            STEFANO SALAS MD; Attending Interventional Radiologist  This document has been electronically signed. Jun 4 2021  3:59PM    < end of copied text >  < from: Xray Chest 1 View- PORTABLE-Urgent (Xray Chest 1 View- PORTABLE-Urgent .) (06.01.21 @ 14:29) >    EXAM:  XR CHEST PORTABLE URGENT 1V                            PROCEDURE DATE:  06/01/2021          INTERPRETATION:  AP erect chest on June 1, 2021 at 2:34 PM. Patient had removal of pigtail left chest tube.    Heart magnified by technique. Tracheostomy remains.    Left pigtail catheter seen earlier in the day has been removed.    Persistent amorphous infiltrate at the site is seen. No pneumothorax.    IMPRESSION: Uneventful removal of left chest tube.            CARMEN FARRELL M.D., ATTENDING RADIOLOGIST  This document has been electronically signed. Jun 1 2021  4:15PM    < end of copied text >      Goals of Care Discussion with Family/Proxy/Other   - see note from/family meeting set up for 4/14

## 2021-06-10 NOTE — PROGRESS NOTE ADULT - SUBJECTIVE AND OBJECTIVE BOX
Patient is a 55y old  Male who presents with a chief complaint of SOB (09 Jun 2021 10:43)    pt seen in icu [  ], reg med floor scu [ x  ], bed [ x ], chair at bedside [   ], awake and responsive [ x ], mildly sedated, [  ],    nad [x  ]      Allergies    No Known Allergies        Vitals    T(F): 98 (06-10-21 @ 05:30), Max: 98.3 (06-09-21 @ 13:00)  HR: 90 (06-10-21 @ 05:30) (87 - 95)  BP: 161/87 (06-10-21 @ 05:30) (125/70 - 162/90)  RR: 19 (06-10-21 @ 05:30) (18 - 20)  SpO2: 98% (06-10-21 @ 05:30) (98% - 100%)  Wt(kg): --  CAPILLARY BLOOD GLUCOSE      POCT Blood Glucose.: 112 mg/dL (10 Nestor 2021 05:38)      Labs                          10.4   9.00  )-----------( 263      ( 09 Jun 2021 09:13 )             32.3       06-09    136  |  98  |  23<H>  ----------------------------<  117<H>  3.4<L>   |  30  |  0.73    Ca    9.4      09 Jun 2021 09:13  Phos  3.5     06-09  Mg     2.2     06-09    TPro  7.0  /  Alb  3.0<L>  /  TBili  0.5  /  DBili  x   /  AST  51<H>  /  ALT  139<H>  /  AlkPhos  92  06-09            .Sputum Sputum  04-16 @ 04:42   Moderate Enterobacter aerogenes (Carbapenem Resistant)  Normal Respiratory Monserrat present  --  Enterobacter aerogenes (Carbapenem Resistant)      .Urine Clean Catch (Midstream)  03-15 @ 00:52   No growth  --  --          Radiology Results      Meds    MEDICATIONS  (STANDING):  ascorbic acid 1000 milliGRAM(s) Oral daily  BACItracin   Ointment 1 Application(s) Topical two times a day  calcium carbonate 1250 mG  + Vitamin D (OsCal 500 + D) 1 Tablet(s) Oral daily  chlorhexidine 2% Cloths 1 Application(s) Topical <User Schedule>  cholecalciferol 1000 Unit(s) Oral daily  enoxaparin Injectable 40 milliGRAM(s) SubCutaneous every 24 hours  gabapentin 100 milliGRAM(s) Oral every 8 hours  labetalol 100 milliGRAM(s) Enteral Tube two times a day  pantoprazole  Injectable 40 milliGRAM(s) IV Push daily  predniSONE   Tablet 15 milliGRAM(s) Oral daily  QUEtiapine 50 milliGRAM(s) Oral two times a day  senna 2 Tablet(s) Oral at bedtime  trimethoprim  40 mG/sulfamethoxazole 200 mG Suspension 160 milliGRAM(s) Oral <User Schedule>      MEDICATIONS  (PRN):  acetaminophen    Suspension .. 650 milliGRAM(s) Enteral Tube every 12 hours PRN Temp greater or equal to 38C (100.4F), Mild Pain (1 - 3)  ALBUTerol    90 MICROgram(s) HFA Inhaler 2 Puff(s) Inhalation every 6 hours PRN Shortness of Breath and/or Wheezing  artificial  tears Solution 1 Drop(s) Both EYES every 4 hours PRN Dry Eyes  bisacodyl Suppository 10 milliGRAM(s) Rectal daily PRN Constipation  ondansetron Injectable 4 milliGRAM(s) IV Push every 6 hours PRN Nausea and/or Vomiting  polyethylene glycol 3350 17 Gram(s) Oral daily PRN Constipation  sodium chloride 0.9% lock flush 10 milliLiter(s) IV Push every 1 hour PRN Pre/post blood products, medications, blood draw, and to maintain line patency      Physical Exam    Neuro :  no focal deficits  Respiratory: CTA B/L  CV: RRR, S1S2, no murmurs,   Abdominal: Soft, NT, ND +BS, gastrostomy tube inplace  Extremities: edema of extrem, + peripheral pulses      ASSESSMENT      Hypoxemia 2nd to covid pna   transaminitis  prediabetes  h/o appendectomy  cholecystectomy        PLAN    cont cont precautions  covid 5/14/21 neg noted above  d/c remdesevir given covid ab positive noted   completed dexamethasone   started pulse steroids for 3 days - 250mg solumedrol bid now tapered off 4/14/21   prednisone 20 daily d/c 6/1/21   cont on bactrim empirically, TIW   cont asa, vit c,    cont albuterol inhaler   pulm f/u  procalcitonin, D-dimer, crp, ldh, ferritin, lactate noted ,    cont tylenol prn,   cont robitussin prn   pt off precedex    pt on methadone   pain mgmt eval noted   off phenylephrine drip for hypotension  clonidine held 2nd to low bp  s/p intubation 3/29/21   s/p tracheostomy 4/21/21  O2 sat 100% (98% - 100%) mv 40%  O2 via mech vent   cont wean as tolerated   pt tolerated 9hrs of only pressure support 6/4/21  vent mgmt as per scu  thoracic surg f/u   s/p L chest tube replacement 4/18/21 for recurrence of left pneumothorax   cxr 4/20/21 with Unchanged advanced infiltrates and catheter left chest tube noted   CXR 4/11/21 with : No interval change compared to one day prior. No pneumothorax noted.   unable to flush or aspirate tube fully, noted debris in tubing which was milked out.   Once material removed from tubing able to aspirated and flush fully. Also changed dressing on pigtail which appears to be in good position.   Monitor O2 status   s/p tracheostomy 4/21/21   cxr 4/21/21 with No evidence of active chest disease. Tracheostomy tube in place otherwise no significant change noted   cxr 4/25/21 with A 40% LEFT pneumothorax. LEFT multi-sidehole pigtail catheter overlies LEFT lower hemithorax.. Bilateral multifocal and diffuse ill-defined airspace opacities..  Follow-up AP portable chest radiograph 4/25/2021 AT 8:58 AM: Residual LEFT 30% upper zone pneumothorax. Otherwise no interval change.noted    s/p 2nd chest tube 5/5/21  cxr 5/6/21 with Tracheostomy and catheter left chest tubes remain. , There are significant diffuse advanced infiltrates again noted. No pneumothorax. Above findings are similar to study earlier in the day. Present film shows a left jugular line inserted   with tip entering the superior vena cava noted   cxr 5/30/21 with Tracheostomy cannula left chest catheter reidentified in position. No change small simple left apical pneumothorax. No change bilateral airspace opacities. Trace left pleural effusion suggested noted   cxr 5/31/21 with Status post tracheostomy. Status post left chest pigtail catheter. Trace left pneumothorax is unchanged. Cardiomegaly. Moderate to severe multifocal patchy opacificationof both lungs, left greater than right, is unchanged. Nonobstructive bowel gas pattern. No dilated loops of small or large bowel noted   s/p removal of L pigtail.   CXR 6/1/21 post l pig tail removal with Left pigtail catheter seen earlier in the day has been removed. Persistent amorphous infiltrate at the site is seen. No pneumothorax noted above.  No acute thoracic surgical intervention at this time,  s/p surgical placement of gastrostomy tube 4/23/2021.   abd xray 5/10/21 with ileus noted   dressing changed daily for seroma   abd xray 5/21/21 with Decreased bowel ileus compared to prior 5/20/2021 exam noted above.   abd xray 5/22/21 with No dilated loops of bowel noted above.   cont tube feeding   CT scan of the chest 5/13/21 demonstrates diffuse bilateral infiltrates with a region of consolidation at the left lung base. Small left pneumothorax. 2 left chest tubes noted in place noted above.  CT scan of the abdomen and pelvis 5/13/21 demonstrates diffuse dilatation of small and large bowel loops most consistent with ileus noted   xray abd with  5/14/21 with Ingested contrast within nonobstructed large bowel to the level of rectum. No acute radiographic intra-abdominal findings noted   ct abd-pelv 5/27/21 with Delayed nephrographic phase enhancement of the nonobstructed bilateral kidneys, suggestive of acute renal insufficiency. No evidence of bowel obstruction. Proctitis noted above.   id f/u   patient completed course of Meropenem  leukocytosis resolved  sputum cx with Enterobacter aerogenes (Carbapenem Resistant) noted above   afebrile  Completed  meropenem, s/p 1 dose of Vancomycin   completed zosyn  lispro ss   s/p 4 units prbc for anemia  f/u h/h    transfuse prbc as needed  heme onc f/u  Haptoglobin normal  no hemolysis, Fe/B12/folate adequate  hemolysis is unlikely even his baseline haptoglobin may be very elevated due to COVID  direct pamella neg noted    psych f/u/  C/w Klonopin to 1 mg q8h standing (no PRNs), with plan to further taper as tolerated  cont Seroquel to 50 mg BID standing starting tonight  Continue delirium precautions: Frequent reorientation, familiar pictures and objects at bedside, natural light in daytime,   consistent sleep/wake schedule, adequate hydration and nutrition, sensory aids (hearing aids, glasses) present if needed, minimizing noise and overstimulation,   clustering care to minimize overnight interruptions, judicious use of deliriogenic medications (anticholinergics, benzodiazepines and opioid analgesics), minimize use of restraints  cont current meds   d/c planning for pavilion pending auth  mgmt as per scu           Patient is a 55y old  Male who presents with a chief complaint of SOB (09 Jun 2021 10:43)    pt seen in icu [  ], reg med floor scu [ x  ], bed [ x ], chair at bedside [   ], awake and responsive [ x ], mildly sedated, [  ],    nad [x  ]      Allergies    No Known Allergies        Vitals    T(F): 98 (06-10-21 @ 05:30), Max: 98.3 (06-09-21 @ 13:00)  HR: 90 (06-10-21 @ 05:30) (87 - 95)  BP: 161/87 (06-10-21 @ 05:30) (125/70 - 162/90)  RR: 19 (06-10-21 @ 05:30) (18 - 20)  SpO2: 98% (06-10-21 @ 05:30) (98% - 100%)  Wt(kg): --  CAPILLARY BLOOD GLUCOSE      POCT Blood Glucose.: 112 mg/dL (10 Nestor 2021 05:38)      Labs                          10.4   9.00  )-----------( 263      ( 09 Jun 2021 09:13 )             32.3       06-09    136  |  98  |  23<H>  ----------------------------<  117<H>  3.4<L>   |  30  |  0.73    Ca    9.4      09 Jun 2021 09:13  Phos  3.5     06-09  Mg     2.2     06-09    TPro  7.0  /  Alb  3.0<L>  /  TBili  0.5  /  DBili  x   /  AST  51<H>  /  ALT  139<H>  /  AlkPhos  92  06-09            .Sputum Sputum  04-16 @ 04:42   Moderate Enterobacter aerogenes (Carbapenem Resistant)  Normal Respiratory Monserrat present  --  Enterobacter aerogenes (Carbapenem Resistant)      .Urine Clean Catch (Midstream)  03-15 @ 00:52   No growth  --  --          Radiology Results      Meds    MEDICATIONS  (STANDING):  ascorbic acid 1000 milliGRAM(s) Oral daily  BACItracin   Ointment 1 Application(s) Topical two times a day  calcium carbonate 1250 mG  + Vitamin D (OsCal 500 + D) 1 Tablet(s) Oral daily  chlorhexidine 2% Cloths 1 Application(s) Topical <User Schedule>  cholecalciferol 1000 Unit(s) Oral daily  enoxaparin Injectable 40 milliGRAM(s) SubCutaneous every 24 hours  gabapentin 100 milliGRAM(s) Oral every 8 hours  labetalol 100 milliGRAM(s) Enteral Tube two times a day  pantoprazole  Injectable 40 milliGRAM(s) IV Push daily  predniSONE   Tablet 15 milliGRAM(s) Oral daily  QUEtiapine 50 milliGRAM(s) Oral two times a day  senna 2 Tablet(s) Oral at bedtime  trimethoprim  40 mG/sulfamethoxazole 200 mG Suspension 160 milliGRAM(s) Oral <User Schedule>      MEDICATIONS  (PRN):  acetaminophen    Suspension .. 650 milliGRAM(s) Enteral Tube every 12 hours PRN Temp greater or equal to 38C (100.4F), Mild Pain (1 - 3)  ALBUTerol    90 MICROgram(s) HFA Inhaler 2 Puff(s) Inhalation every 6 hours PRN Shortness of Breath and/or Wheezing  artificial  tears Solution 1 Drop(s) Both EYES every 4 hours PRN Dry Eyes  bisacodyl Suppository 10 milliGRAM(s) Rectal daily PRN Constipation  ondansetron Injectable 4 milliGRAM(s) IV Push every 6 hours PRN Nausea and/or Vomiting  polyethylene glycol 3350 17 Gram(s) Oral daily PRN Constipation  sodium chloride 0.9% lock flush 10 milliLiter(s) IV Push every 1 hour PRN Pre/post blood products, medications, blood draw, and to maintain line patency      Physical Exam    Neuro :  no focal deficits  Respiratory: CTA B/L  CV: RRR, S1S2, no murmurs,   Abdominal: Soft, NT, ND +BS, gastrostomy tube inplace  Extremities: edema of extrem, + peripheral pulses      ASSESSMENT      Hypoxemia 2nd to covid pna   transaminitis  prediabetes  h/o appendectomy  cholecystectomy        PLAN    cont cont precautions  covid 5/14/21 neg noted above  d/c remdesevir given covid ab positive noted   completed dexamethasone   started pulse steroids for 3 days - 250mg solumedrol bid now tapered off 4/14/21   prednisone 20 daily d/c 6/1/21   cont on bactrim empirically, TIW   cont asa, vit c,    cont albuterol inhaler   pulm f/u  procalcitonin, D-dimer, crp, ldh, ferritin, lactate noted ,    cont tylenol prn,   cont robitussin prn   pt off precedex    pt on methadone   pain mgmt eval noted   off phenylephrine drip for hypotension  clonidine held 2nd to low bp  s/p intubation 3/29/21   s/p tracheostomy 4/21/21  O2 sat 100% (98% - 100%) mv 40%  O2 via mech vent   cont wean as tolerated   pt tolerated 9hrs of only pressure support 6/4/21  vent mgmt as per scu  thoracic surg f/u   s/p L chest tube replacement 4/18/21 for recurrence of left pneumothorax   cxr 4/20/21 with Unchanged advanced infiltrates and catheter left chest tube noted   CXR 4/11/21 with : No interval change compared to one day prior. No pneumothorax noted.   unable to flush or aspirate tube fully, noted debris in tubing which was milked out.   Once material removed from tubing able to aspirated and flush fully. Also changed dressing on pigtail which appears to be in good position.   Monitor O2 status   s/p tracheostomy 4/21/21   cxr 4/21/21 with No evidence of active chest disease. Tracheostomy tube in place otherwise no significant change noted   cxr 4/25/21 with A 40% LEFT pneumothorax. LEFT multi-sidehole pigtail catheter overlies LEFT lower hemithorax.. Bilateral multifocal and diffuse ill-defined airspace opacities..  Follow-up AP portable chest radiograph 4/25/2021 AT 8:58 AM: Residual LEFT 30% upper zone pneumothorax. Otherwise no interval change.noted    s/p 2nd chest tube 5/5/21  cxr 5/6/21 with Tracheostomy and catheter left chest tubes remain. , There are significant diffuse advanced infiltrates again noted. No pneumothorax. Above findings are similar to study earlier in the day. Present film shows a left jugular line inserted   with tip entering the superior vena cava noted   cxr 5/30/21 with Tracheostomy cannula left chest catheter reidentified in position. No change small simple left apical pneumothorax. No change bilateral airspace opacities. Trace left pleural effusion suggested noted   cxr 5/31/21 with Status post tracheostomy. Status post left chest pigtail catheter. Trace left pneumothorax is unchanged. Cardiomegaly. Moderate to severe multifocal patchy opacificationof both lungs, left greater than right, is unchanged. Nonobstructive bowel gas pattern. No dilated loops of small or large bowel noted   s/p removal of L pigtail.   CXR 6/1/21 post l pig tail removal with Left pigtail catheter seen earlier in the day has been removed. Persistent amorphous infiltrate at the site is seen. No pneumothorax noted above.  No acute thoracic surgical intervention at this time,  s/p surgical placement of gastrostomy tube 4/23/2021.   abd xray 5/10/21 with ileus noted   dressing changed daily for seroma   abd xray 5/21/21 with Decreased bowel ileus compared to prior 5/20/2021 exam noted above.   abd xray 5/22/21 with No dilated loops of bowel noted above.   cont tube feeding   CT scan of the chest 5/13/21 demonstrates diffuse bilateral infiltrates with a region of consolidation at the left lung base. Small left pneumothorax. 2 left chest tubes noted in place noted above.  CT scan of the abdomen and pelvis 5/13/21 demonstrates diffuse dilatation of small and large bowel loops most consistent with ileus noted   xray abd with  5/14/21 with Ingested contrast within nonobstructed large bowel to the level of rectum. No acute radiographic intra-abdominal findings noted   ct abd-pelv 5/27/21 with Delayed nephrographic phase enhancement of the nonobstructed bilateral kidneys, suggestive of acute renal insufficiency. No evidence of bowel obstruction. Proctitis noted above.   id f/u   patient completed course of Meropenem  leukocytosis resolved  sputum cx with Enterobacter aerogenes (Carbapenem Resistant) noted above   afebrile  Completed  meropenem, s/p 1 dose of Vancomycin   completed zosyn  lispro ss   s/p 4 units prbc for anemia  f/u h/h    transfuse prbc as needed  heme onc f/u  Haptoglobin normal  no hemolysis, Fe/B12/folate adequate  hemolysis is unlikely even his baseline haptoglobin may be very elevated due to COVID  direct pamella neg noted    psych f/u/  C/w Klonopin at reduced dose of 0.5 mg q12h standing (no PRNs),   cont Seroquel at same dose (50 mg BID)  Continue delirium precautions: Frequent reorientation, familiar pictures and objects at bedside, natural light in daytime, consistent sleep/wake schedule, adequate hydration and nutrition, sensory aids (hearing aids, glasses) present if needed, minimizing noise and overstimulation, clustering care to minimize overnight interruptions, judicious use of deliriogenic medications (anticholinergics, benzodiazepines and opioid analgesics), minimize use of restraints  --Highly recommend pt be provided with whiteboard and marker to facilitate communication with providers  Pt is psych cleared for dispo as soon as he is medically optimized  cont current meds   d/c planning for pavilion pending auth  mgmt as per scu

## 2021-06-10 NOTE — CHART NOTE - NSCHARTNOTEFT_GEN_A_CORE
Assessment:   55y Male Patient is a 55y old  Male who presents with a chief complaint of SOB (10 Nestor 2021 11:54). Pt visited. Pt is on vent via Trach. TF Vital 1.2 infusing @ 50 ml /hr x 24 hr. ( 1200 ml,1440 calories, Protein 90  gms, free water 973 ml /day).  D/W RN TF tolerating. Pt able to talk with Low Voice. Bed scale 134 lb ( with out scd device) . Pt is on contact isolation. Pt is on vent via trach.  No diarrhea Reported.      Factors impacting intake: [ ] none [ ] nausea  [ ] vomiting [ ] diarrhea [ ] constipation  [ ]chewing problems [ ] swallowing issues  [ ] other:     Diet Prescription: Diet, NPO with Tube Feed:   Tube Feeding Modality: Gastrostomy  Vital AF 1.2 Leonardo  Total Volume for 24 Hours (mL): 1200  Continuous  Starting Tube Feed Rate {mL per Hour}: 30  Increase Tube Feed Rate by (mL): 10     Every 8 hours  Until Goal Tube Feed Rate (mL per Hour): 50  Tube Feed Duration (in Hours): 24  Tube Feed Start Time: 12:45 (06-02-21 @ 12:44)    Intake: NPO w TF     Current Weight:  134 lb  with out  SCD device  % Weight  Change     Pertinent Medications: MEDICATIONS  (STANDING):  ascorbic acid 1000 milliGRAM(s) Oral daily  BACItracin   Ointment 1 Application(s) Topical two times a day  calcium carbonate 1250 mG  + Vitamin D (OsCal 500 + D) 1 Tablet(s) Oral daily  chlorhexidine 2% Cloths 1 Application(s) Topical <User Schedule>  cholecalciferol 1000 Unit(s) Oral daily  clonazePAM  Tablet 0.5 milliGRAM(s) Oral every 12 hours  enoxaparin Injectable 40 milliGRAM(s) SubCutaneous every 24 hours  gabapentin 100 milliGRAM(s) Oral every 8 hours  labetalol 100 milliGRAM(s) Enteral Tube two times a day  pantoprazole  Injectable 40 milliGRAM(s) IV Push daily  predniSONE   Tablet 15 milliGRAM(s) Oral daily  QUEtiapine 50 milliGRAM(s) Oral two times a day  senna 2 Tablet(s) Oral at bedtime  trimethoprim  40 mG/sulfamethoxazole 200 mG Suspension 160 milliGRAM(s) Oral <User Schedule>    MEDICATIONS  (PRN):  acetaminophen    Suspension .. 650 milliGRAM(s) Enteral Tube every 12 hours PRN Temp greater or equal to 38C (100.4F), Mild Pain (1 - 3)  ALBUTerol    90 MICROgram(s) HFA Inhaler 2 Puff(s) Inhalation every 6 hours PRN Shortness of Breath and/or Wheezing  artificial  tears Solution 1 Drop(s) Both EYES every 4 hours PRN Dry Eyes  bisacodyl Suppository 10 milliGRAM(s) Rectal daily PRN Constipatio n  ondansetron Injectable 4 milliGRAM(s) IV Push every 6 hours PRN Nausea and/or Vomiting  polyethylene glycol 3350 17 Gram(s) Oral daily PRN Constipation  sodium chloride 0.9% lock flush 10 milliLiter(s) IV Push every 1 hour PRN Pre/post blood products, medications, blood draw, and to maintain line patency    Pertinent Labs: 06-09 Na136 mmol/L Glu 117 mg/dL<H> K+ 3.4 mmol/L<L> Cr  0.73 mg/dL BUN 23 mg/dL<H> 06-09 Phos 3.5 mg/dL 06-09 Alb 3.0 g/dL<L>     CAPILLARY BLOOD GLUCOSE      POCT Blood Glucose.: 144 mg/dL (10 Nestor 2021 11:48)  POCT Blood Glucose.: 112 mg/dL (10 Nestor 2021 05:38)  POCT Blood Glucose.: 133 mg/dL (09 Jun 2021 23:30)  POCT Blood Glucose.: 115 mg/dL (09 Jun 2021 16:40)    Skin:  Stage 1 @ sacrum    Estimated Needs:   [ ] no change since previous assessment  [ ] recalculated:     Previous Nutrition Diagnosis:   [ ] Inadequate Energy Intake [ ]Inadequate Oral Intake [ ] Excessive Energy Intake   [ ] Underweight [ ] Increased Nutrient Needs [ ] Overweight/Obesity   [ ] Altered GI Function [ ] Unintended Weight Loss [ ] Food & Nutrition Related Knowledge Deficit [x ] Malnutrition severe    Nutrition Diagnosis is [x ] ongoing  [ ] resolved [ ] not applicable     New Nutrition Diagnosis: [ ] not applicable       Interventions:   Recommend  [ ] Change Diet To:  [ ] Nutrition Supplement  [x ] Nutrition Support    Continue with vital 1.2 @ 50 Ml hr x 24 hr as tolerated    [ ] Other:     Monitoring and Evaluation:   [ ] PO intake [ x ] Tolerance to diet prescription [ x ] weights [ x ] labs[ x ] follow up per protocol  [ ] other:

## 2021-06-11 ENCOUNTER — TRANSCRIPTION ENCOUNTER (OUTPATIENT)
Age: 56
End: 2021-06-11

## 2021-06-11 VITALS
SYSTOLIC BLOOD PRESSURE: 141 MMHG | RESPIRATION RATE: 18 BRPM | DIASTOLIC BLOOD PRESSURE: 86 MMHG | OXYGEN SATURATION: 99 % | HEART RATE: 93 BPM | TEMPERATURE: 99 F

## 2021-06-11 LAB
GLUCOSE BLDC GLUCOMTR-MCNC: 125 MG/DL — HIGH (ref 70–99)
GLUCOSE BLDC GLUCOMTR-MCNC: 147 MG/DL — HIGH (ref 70–99)

## 2021-06-11 PROCEDURE — 97110 THERAPEUTIC EXERCISES: CPT

## 2021-06-11 PROCEDURE — 71045 X-RAY EXAM CHEST 1 VIEW: CPT

## 2021-06-11 PROCEDURE — 84484 ASSAY OF TROPONIN QUANT: CPT

## 2021-06-11 PROCEDURE — 83735 ASSAY OF MAGNESIUM: CPT

## 2021-06-11 PROCEDURE — 80053 COMPREHEN METABOLIC PANEL: CPT

## 2021-06-11 PROCEDURE — 84478 ASSAY OF TRIGLYCERIDES: CPT

## 2021-06-11 PROCEDURE — 86923 COMPATIBILITY TEST ELECTRIC: CPT

## 2021-06-11 PROCEDURE — 87086 URINE CULTURE/COLONY COUNT: CPT

## 2021-06-11 PROCEDURE — 82962 GLUCOSE BLOOD TEST: CPT

## 2021-06-11 PROCEDURE — 83605 ASSAY OF LACTIC ACID: CPT

## 2021-06-11 PROCEDURE — 86900 BLOOD TYPING SEROLOGIC ABO: CPT

## 2021-06-11 PROCEDURE — P9047: CPT

## 2021-06-11 PROCEDURE — 86022 PLATELET ANTIBODIES: CPT

## 2021-06-11 PROCEDURE — 86880 COOMBS TEST DIRECT: CPT

## 2021-06-11 PROCEDURE — 84105 ASSAY OF URINE PHOSPHORUS: CPT

## 2021-06-11 PROCEDURE — 82570 ASSAY OF URINE CREATININE: CPT

## 2021-06-11 PROCEDURE — 83036 HEMOGLOBIN GLYCOSYLATED A1C: CPT

## 2021-06-11 PROCEDURE — 83550 IRON BINDING TEST: CPT

## 2021-06-11 PROCEDURE — 85025 COMPLETE CBC W/AUTO DIFF WBC: CPT

## 2021-06-11 PROCEDURE — 74174 CTA ABD&PLVS W/CONTRAST: CPT

## 2021-06-11 PROCEDURE — 85730 THROMBOPLASTIN TIME PARTIAL: CPT

## 2021-06-11 PROCEDURE — 86901 BLOOD TYPING SEROLOGIC RH(D): CPT

## 2021-06-11 PROCEDURE — 93971 EXTREMITY STUDY: CPT

## 2021-06-11 PROCEDURE — 83540 ASSAY OF IRON: CPT

## 2021-06-11 PROCEDURE — 94640 AIRWAY INHALATION TREATMENT: CPT

## 2021-06-11 PROCEDURE — 74018 RADEX ABDOMEN 1 VIEW: CPT

## 2021-06-11 PROCEDURE — 85379 FIBRIN DEGRADATION QUANT: CPT

## 2021-06-11 PROCEDURE — 84439 ASSAY OF FREE THYROXINE: CPT

## 2021-06-11 PROCEDURE — 0225U NFCT DS DNA&RNA 21 SARSCOV2: CPT

## 2021-06-11 PROCEDURE — P9040: CPT

## 2021-06-11 PROCEDURE — 85045 AUTOMATED RETICULOCYTE COUNT: CPT

## 2021-06-11 PROCEDURE — 93306 TTE W/DOPPLER COMPLETE: CPT

## 2021-06-11 PROCEDURE — 84481 FREE ASSAY (FT-3): CPT

## 2021-06-11 PROCEDURE — 86769 SARS-COV-2 COVID-19 ANTIBODY: CPT

## 2021-06-11 PROCEDURE — 36430 TRANSFUSION BLD/BLD COMPNT: CPT

## 2021-06-11 PROCEDURE — 80076 HEPATIC FUNCTION PANEL: CPT

## 2021-06-11 PROCEDURE — 86140 C-REACTIVE PROTEIN: CPT

## 2021-06-11 PROCEDURE — 82728 ASSAY OF FERRITIN: CPT

## 2021-06-11 PROCEDURE — 94002 VENT MGMT INPAT INIT DAY: CPT

## 2021-06-11 PROCEDURE — 82550 ASSAY OF CK (CPK): CPT

## 2021-06-11 PROCEDURE — 84466 ASSAY OF TRANSFERRIN: CPT

## 2021-06-11 PROCEDURE — 87635 SARS-COV-2 COVID-19 AMP PRB: CPT

## 2021-06-11 PROCEDURE — 87641 MR-STAPH DNA AMP PROBE: CPT

## 2021-06-11 PROCEDURE — 99285 EMERGENCY DEPT VISIT HI MDM: CPT | Mod: 25

## 2021-06-11 PROCEDURE — 83615 LACTATE (LD) (LDH) ENZYME: CPT

## 2021-06-11 PROCEDURE — 70450 CT HEAD/BRAIN W/O DYE: CPT

## 2021-06-11 PROCEDURE — 85027 COMPLETE CBC AUTOMATED: CPT

## 2021-06-11 PROCEDURE — 84300 ASSAY OF URINE SODIUM: CPT

## 2021-06-11 PROCEDURE — 87077 CULTURE AEROBIC IDENTIFY: CPT

## 2021-06-11 PROCEDURE — 87186 SC STD MICRODIL/AGAR DIL: CPT

## 2021-06-11 PROCEDURE — 74177 CT ABD & PELVIS W/CONTRAST: CPT

## 2021-06-11 PROCEDURE — 82607 VITAMIN B-12: CPT

## 2021-06-11 PROCEDURE — 82803 BLOOD GASES ANY COMBINATION: CPT

## 2021-06-11 PROCEDURE — 94003 VENT MGMT INPAT SUBQ DAY: CPT

## 2021-06-11 PROCEDURE — 83010 ASSAY OF HAPTOGLOBIN QUANT: CPT

## 2021-06-11 PROCEDURE — 80061 LIPID PANEL: CPT

## 2021-06-11 PROCEDURE — 82746 ASSAY OF FOLIC ACID SERUM: CPT

## 2021-06-11 PROCEDURE — 97162 PT EVAL MOD COMPLEX 30 MIN: CPT

## 2021-06-11 PROCEDURE — 84540 ASSAY OF URINE/UREA-N: CPT

## 2021-06-11 PROCEDURE — 87070 CULTURE OTHR SPECIMN AEROBIC: CPT

## 2021-06-11 PROCEDURE — 80048 BASIC METABOLIC PNL TOTAL CA: CPT

## 2021-06-11 PROCEDURE — 84145 PROCALCITONIN (PCT): CPT

## 2021-06-11 PROCEDURE — 83090 ASSAY OF HOMOCYSTEINE: CPT

## 2021-06-11 PROCEDURE — 84443 ASSAY THYROID STIM HORMONE: CPT

## 2021-06-11 PROCEDURE — 0031A: CPT

## 2021-06-11 PROCEDURE — 87640 STAPH A DNA AMP PROBE: CPT

## 2021-06-11 PROCEDURE — 97164 PT RE-EVAL EST PLAN CARE: CPT

## 2021-06-11 PROCEDURE — 97530 THERAPEUTIC ACTIVITIES: CPT

## 2021-06-11 PROCEDURE — 84100 ASSAY OF PHOSPHORUS: CPT

## 2021-06-11 PROCEDURE — 83921 ORGANIC ACID SINGLE QUANT: CPT

## 2021-06-11 PROCEDURE — 74176 CT ABD & PELVIS W/O CONTRAST: CPT

## 2021-06-11 PROCEDURE — 86850 RBC ANTIBODY SCREEN: CPT

## 2021-06-11 PROCEDURE — L8699: CPT

## 2021-06-11 PROCEDURE — 85610 PROTHROMBIN TIME: CPT

## 2021-06-11 PROCEDURE — 81001 URINALYSIS AUTO W/SCOPE: CPT

## 2021-06-11 PROCEDURE — 84133 ASSAY OF URINE POTASSIUM: CPT

## 2021-06-11 PROCEDURE — 71250 CT THORAX DX C-: CPT

## 2021-06-11 PROCEDURE — 80202 ASSAY OF VANCOMYCIN: CPT

## 2021-06-11 PROCEDURE — 87493 C DIFF AMPLIFIED PROBE: CPT

## 2021-06-11 PROCEDURE — 36415 COLL VENOUS BLD VENIPUNCTURE: CPT

## 2021-06-11 PROCEDURE — 93005 ELECTROCARDIOGRAM TRACING: CPT

## 2021-06-11 PROCEDURE — 86803 HEPATITIS C AB TEST: CPT

## 2021-06-11 RX ORDER — BACITRACIN ZINC 500 UNIT/G
1 OINTMENT IN PACKET (EA) TOPICAL
Qty: 0 | Refills: 0 | DISCHARGE
Start: 2021-06-11

## 2021-06-11 RX ORDER — CHOLECALCIFEROL (VITAMIN D3) 125 MCG
1000 CAPSULE ORAL
Qty: 0 | Refills: 0 | DISCHARGE
Start: 2021-06-11

## 2021-06-11 RX ORDER — PANTOPRAZOLE SODIUM 20 MG/1
40 TABLET, DELAYED RELEASE ORAL
Qty: 0 | Refills: 0 | DISCHARGE
Start: 2021-06-11

## 2021-06-11 RX ORDER — ONDANSETRON 8 MG/1
0 TABLET, FILM COATED ORAL
Qty: 0 | Refills: 0 | DISCHARGE
Start: 2021-06-11

## 2021-06-11 RX ORDER — SENNA PLUS 8.6 MG/1
2 TABLET ORAL
Qty: 0 | Refills: 0 | DISCHARGE
Start: 2021-06-11

## 2021-06-11 RX ORDER — LABETALOL HCL 100 MG
1 TABLET ORAL
Qty: 0 | Refills: 0 | DISCHARGE
Start: 2021-06-11

## 2021-06-11 RX ORDER — CLONAZEPAM 1 MG
1 TABLET ORAL
Qty: 0 | Refills: 0 | DISCHARGE
Start: 2021-06-11

## 2021-06-11 RX ORDER — POLYETHYLENE GLYCOL 3350 17 G/17G
17 POWDER, FOR SOLUTION ORAL
Qty: 0 | Refills: 0 | DISCHARGE
Start: 2021-06-11

## 2021-06-11 RX ORDER — CHLORHEXIDINE GLUCONATE 213 G/1000ML
1 SOLUTION TOPICAL
Qty: 0 | Refills: 0 | DISCHARGE
Start: 2021-06-11

## 2021-06-11 RX ORDER — QUETIAPINE FUMARATE 200 MG/1
1 TABLET, FILM COATED ORAL
Qty: 0 | Refills: 0 | DISCHARGE
Start: 2021-06-11

## 2021-06-11 RX ORDER — JNJ-78436735 50000000000 [PFU]/.5ML
0.5 SUSPENSION INTRAMUSCULAR ONCE
Refills: 0 | Status: COMPLETED | OUTPATIENT
Start: 2021-06-11 | End: 2021-06-11

## 2021-06-11 RX ORDER — ASCORBIC ACID 60 MG
1 TABLET,CHEWABLE ORAL
Qty: 0 | Refills: 0 | DISCHARGE
Start: 2021-06-11

## 2021-06-11 RX ORDER — ACETAMINOPHEN 500 MG
20.31 TABLET ORAL
Qty: 0 | Refills: 0 | DISCHARGE
Start: 2021-06-11

## 2021-06-11 RX ORDER — ALBUTEROL 90 UG/1
2 AEROSOL, METERED ORAL
Qty: 0 | Refills: 0 | DISCHARGE
Start: 2021-06-11

## 2021-06-11 RX ORDER — GABAPENTIN 400 MG/1
1 CAPSULE ORAL
Qty: 0 | Refills: 0 | DISCHARGE
Start: 2021-06-11

## 2021-06-11 RX ADMIN — Medication 650 MILLIGRAM(S): at 05:12

## 2021-06-11 RX ADMIN — Medication 1000 UNIT(S): at 12:26

## 2021-06-11 RX ADMIN — Medication 650 MILLIGRAM(S): at 06:44

## 2021-06-11 RX ADMIN — Medication 1000 MILLIGRAM(S): at 12:26

## 2021-06-11 RX ADMIN — ENOXAPARIN SODIUM 40 MILLIGRAM(S): 100 INJECTION SUBCUTANEOUS at 12:26

## 2021-06-11 RX ADMIN — Medication 160 MILLIGRAM(S): at 05:07

## 2021-06-11 RX ADMIN — Medication 1 APPLICATION(S): at 05:07

## 2021-06-11 RX ADMIN — Medication 1 TABLET(S): at 12:26

## 2021-06-11 RX ADMIN — QUETIAPINE FUMARATE 50 MILLIGRAM(S): 200 TABLET, FILM COATED ORAL at 10:09

## 2021-06-11 RX ADMIN — GABAPENTIN 100 MILLIGRAM(S): 400 CAPSULE ORAL at 05:07

## 2021-06-11 RX ADMIN — CHLORHEXIDINE GLUCONATE 1 APPLICATION(S): 213 SOLUTION TOPICAL at 05:03

## 2021-06-11 RX ADMIN — PANTOPRAZOLE SODIUM 40 MILLIGRAM(S): 20 TABLET, DELAYED RELEASE ORAL at 12:26

## 2021-06-11 RX ADMIN — Medication 100 MILLIGRAM(S): at 05:07

## 2021-06-11 RX ADMIN — Medication 15 MILLIGRAM(S): at 05:07

## 2021-06-11 RX ADMIN — Medication 0.5 MILLIGRAM(S): at 05:07

## 2021-06-11 RX ADMIN — JNJ-78436735 0.5 MILLILITER(S): 50000000000 SUSPENSION INTRAMUSCULAR at 12:47

## 2021-06-11 NOTE — PROGRESS NOTE ADULT - NUTRITIONAL ASSESSMENT
This patient has been assessed with a concern for Malnutrition and has been determined to have a diagnosis/diagnoses of Moderate protein-calorie malnutrition.    This patient is being managed with:   Diet NPO with Tube Feed-  Tube Feeding Modality: Nasogastric  Jevity 1.5 Leonardo  Total Volume for 24 Hours (mL): 960  Continuous  Starting Tube Feed Rate {mL per Hour}: 10  Increase Tube Feed Rate by (mL): 10     Every hour  Until Goal Tube Feed Rate (mL per Hour): 40  Tube Feed Duration (in Hours): 24  Tube Feed Start Time: 02:00  Entered: Mar 30 2021  1:48AM    
This patient has been assessed with a concern for Malnutrition and has been determined to have a diagnosis/diagnoses of Moderate protein-calorie malnutrition.    This patient is being managed with:   Diet NPO-  Entered: Mar 29 2021  6:01AM    
This patient has been assessed with a concern for Malnutrition and has been determined to have a diagnosis/diagnoses of Severe protein-calorie malnutrition.    This patient is being managed with:   Diet NPO after Midnight-     NPO Start Date: 20-Apr-2021   NPO Start Time: 23:59  Entered: Apr 20 2021  6:06PM    Diet NPO with Tube Feed-  Tube Feeding Modality: Nasogastric  Glucerna 1.5 Leonardo  Total Volume for 24 Hours (mL): 600  Continuous  Starting Tube Feed Rate {mL per Hour}: 10  Increase Tube Feed Rate by (mL): 10     Every hour  Until Goal Tube Feed Rate (mL per Hour): 25  Tube Feed Duration (in Hours): 24  Tube Feed Start Time: 02:00  No Carb Prosource (1pkg = 15gms Protein)     Qty per Day:  2  Entered: Apr 1 2021  4:49PM    
This patient has been assessed with a concern for Malnutrition and has been determined to have a diagnosis/diagnoses of Severe protein-calorie malnutrition.    This patient is being managed with:   Diet NPO after Midnight-     NPO Start Date: 20-Apr-2021   NPO Start Time: 23:59  Entered: Apr 22 2021 12:03PM    Diet NPO with Tube Feed-  Tube Feeding Modality: Nasogastric  Glucerna 1.5 Leonardo  Total Volume for 24 Hours (mL): 600  Continuous  Starting Tube Feed Rate {mL per Hour}: 10  Increase Tube Feed Rate by (mL): 10     Every hour  Until Goal Tube Feed Rate (mL per Hour): 25  Tube Feed Duration (in Hours): 24  Tube Feed Start Time: 02:00  No Carb Prosource (1pkg = 15gms Protein)     Qty per Day:  2  Entered: Apr 1 2021  4:49PM    
This patient has been assessed with a concern for Malnutrition and has been determined to have a diagnosis/diagnoses of Severe protein-calorie malnutrition.    This patient is being managed with:   Diet NPO with Tube Feed-  Tube Feeding Modality: Gastrostomy  Jevity 1.5 Leonardo  Total Volume for 24 Hours (mL): 960  Continuous  Starting Tube Feed Rate {mL per Hour}: 10  Increase Tube Feed Rate by (mL): 10     Every hour  Until Goal Tube Feed Rate (mL per Hour): 40  Tube Feed Duration (in Hours): 24  Tube Feed Start Time: 01:00  Entered: Apr 25 2021 12:21AM    
This patient has been assessed with a concern for Malnutrition and has been determined to have a diagnosis/diagnoses of Severe protein-calorie malnutrition.    This patient is being managed with:   Diet NPO with Tube Feed-  Tube Feeding Modality: Gastrostomy  Jevity 1.5 Leonardo  Total Volume for 24 Hours (mL): 960  Continuous  Starting Tube Feed Rate {mL per Hour}: 10  Increase Tube Feed Rate by (mL): 10     Every hour  Until Goal Tube Feed Rate (mL per Hour): 40  Tube Feed Duration (in Hours): 24  Tube Feed Start Time: 01:00  Entered: Apr 26 2021  9:31AM    
This patient has been assessed with a concern for Malnutrition and has been determined to have a diagnosis/diagnoses of Severe protein-calorie malnutrition.    This patient is being managed with:   Diet NPO with Tube Feed-  Tube Feeding Modality: Gastrostomy  Jevity 1.5 Leonardo  Total Volume for 24 Hours (mL): 960  Continuous  Starting Tube Feed Rate {mL per Hour}: 10  Increase Tube Feed Rate by (mL): 10     Every hour  Until Goal Tube Feed Rate (mL per Hour): 40  Tube Feed Duration (in Hours): 24  Tube Feed Start Time: 01:00  Entered: May  4 2021 11:14AM    
This patient has been assessed with a concern for Malnutrition and has been determined to have a diagnosis/diagnoses of Severe protein-calorie malnutrition.    This patient is being managed with:   Diet NPO with Tube Feed-  Tube Feeding Modality: Gastrostomy  Jevity 1.5 Leonardo  Total Volume for 24 Hours (mL): 960  Continuous  Starting Tube Feed Rate {mL per Hour}: 10  Increase Tube Feed Rate by (mL): 10     Every hour  Until Goal Tube Feed Rate (mL per Hour): 40  Tube Feed Duration (in Hours): 24  Tube Feed Start Time: 01:00  No Carb Prosource (1pkg = 15gms Protein)     Qty per Day:  1  Entered: May 18 2021  7:24AM    
This patient has been assessed with a concern for Malnutrition and has been determined to have a diagnosis/diagnoses of Severe protein-calorie malnutrition.    This patient is being managed with:   Diet NPO with Tube Feed-  Tube Feeding Modality: Gastrostomy  Osmolite 1.2 Leonardo  Total Volume for 24 Hours (mL): 960  Continuous  Starting Tube Feed Rate {mL per Hour}: 10  Increase Tube Feed Rate by (mL): 10     Every hour  Until Goal Tube Feed Rate (mL per Hour): 40  Tube Feed Duration (in Hours): 24  Tube Feed Start Time: 01:00  Entered: May  5 2021 11:39AM    
This patient has been assessed with a concern for Malnutrition and has been determined to have a diagnosis/diagnoses of Severe protein-calorie malnutrition.    This patient is being managed with:   Diet NPO with Tube Feed-  Tube Feeding Modality: Gastrostomy  Vital AF 1.2 Leonardo  Total Volume for 24 Hours (mL): 1200  Continuous  Starting Tube Feed Rate {mL per Hour}: 30  Increase Tube Feed Rate by (mL): 10     Every 8 hours  Until Goal Tube Feed Rate (mL per Hour): 50  Tube Feed Duration (in Hours): 24  Tube Feed Start Time: 12:45  Entered: Jun 2 2021 12:44PM    The following pending diet order is being considered for treatment of Severe protein-calorie malnutrition:  Diet NPO-  Tube Feeding Modality: Gastrostomy  Vital AF 1.2 Leonardo  Total Volume for 24 Hours (mL): 1200  Continuous  Starting Tube Feed Rate {mL per Hour}: 30  Increase Tube Feed Rate by (mL): 20     Every 6 hours  Until Goal Tube Feed Rate (mL per Hour): 50  Tube Feed Duration (in Hours): 24  Tube Feed Start Time: 12:30  Entered: Jun 2 2021 12:20PM  
This patient has been assessed with a concern for Malnutrition and has been determined to have a diagnosis/diagnoses of Severe protein-calorie malnutrition.    This patient is being managed with:   Diet NPO with Tube Feed-  Tube Feeding Modality: Gastrostomy  Vital AF 1.2 Leonardo  Total Volume for 24 Hours (mL): 240  Continuous  Starting Tube Feed Rate {mL per Hour}: 10  Until Goal Tube Feed Rate (mL per Hour): 10  Tube Feed Duration (in Hours): 24  Tube Feed Start Time: 10:00  Entered: May 28 2021  9:34AM    
This patient has been assessed with a concern for Malnutrition and has been determined to have a diagnosis/diagnoses of Severe protein-calorie malnutrition.    This patient is being managed with:   Diet NPO with Tube Feed-  Tube Feeding Modality: Gastrostomy  Vital AF 1.2 Leonardo  Total Volume for 24 Hours (mL): 240  Continuous  Starting Tube Feed Rate {mL per Hour}: 10  Until Goal Tube Feed Rate (mL per Hour): 10  Tube Feed Duration (in Hours): 24  Tube Feed Start Time: 10:00  Entered: May 28 2021  9:34AM    
This patient has been assessed with a concern for Malnutrition and has been determined to have a diagnosis/diagnoses of Severe protein-calorie malnutrition.    This patient is being managed with:   Diet NPO with Tube Feed-  Tube Feeding Modality: Nasogastric  Glucerna 1.5 Leonardo  Total Volume for 24 Hours (mL): 600  Continuous  Starting Tube Feed Rate {mL per Hour}: 10  Increase Tube Feed Rate by (mL): 10     Every hour  Until Goal Tube Feed Rate (mL per Hour): 25  Tube Feed Duration (in Hours): 24  Tube Feed Start Time: 02:00  No Carb Prosource (1pkg = 15gms Protein)     Qty per Day:  2  Entered: Apr 1 2021  4:49PM    
This patient has been assessed with a concern for Malnutrition and has been determined to have a diagnosis/diagnoses of Severe protein-calorie malnutrition.    This patient is being managed with:   Diet NPO-  Except Medications  Entered: May 21 2021 11:27AM    
This patient has been assessed with a concern for Malnutrition and has been determined to have a diagnosis/diagnoses of Moderate protein-calorie malnutrition.    This patient is being managed with:   Diet NPO with Tube Feed-  Tube Feeding Modality: Nasogastric  Jevity 1.5 Leonardo  Total Volume for 24 Hours (mL): 960  Continuous  Starting Tube Feed Rate {mL per Hour}: 10  Increase Tube Feed Rate by (mL): 10     Every hour  Until Goal Tube Feed Rate (mL per Hour): 40  Tube Feed Duration (in Hours): 24  Tube Feed Start Time: 02:00  Entered: Mar 30 2021  1:48AM    
This patient has been assessed with a concern for Malnutrition and has been determined to have a diagnosis/diagnoses of Moderate protein-calorie malnutrition.    This patient is being managed with:   Diet NPO with Tube Feed-  Tube Feeding Modality: Nasogastric  Jevity 1.5 Leonardo  Total Volume for 24 Hours (mL): 960  Continuous  Starting Tube Feed Rate {mL per Hour}: 10  Increase Tube Feed Rate by (mL): 10     Every hour  Until Goal Tube Feed Rate (mL per Hour): 40  Tube Feed Duration (in Hours): 24  Tube Feed Start Time: 02:00  Entered: Mar 30 2021  1:48AM    
This patient has been assessed with a concern for Malnutrition and has been determined to have a diagnosis/diagnoses of Moderate protein-calorie malnutrition.    This patient is being managed with:   Diet NPO-  Entered: Mar 29 2021  6:01AM    
This patient has been assessed with a concern for Malnutrition and has been determined to have a diagnosis/diagnoses of Severe protein-calorie malnutrition.    This patient is being managed with:   Diet NPO after Midnight-     NPO Start Date: 20-Apr-2021   NPO Start Time: 23:59  Entered: Apr 20 2021  6:06PM    Diet NPO with Tube Feed-  Tube Feeding Modality: Nasogastric  Glucerna 1.5 Leonardo  Total Volume for 24 Hours (mL): 600  Continuous  Starting Tube Feed Rate {mL per Hour}: 10  Increase Tube Feed Rate by (mL): 10     Every hour  Until Goal Tube Feed Rate (mL per Hour): 25  Tube Feed Duration (in Hours): 24  Tube Feed Start Time: 02:00  No Carb Prosource (1pkg = 15gms Protein)     Qty per Day:  2  Entered: Apr 1 2021  4:49PM    
This patient has been assessed with a concern for Malnutrition and has been determined to have a diagnosis/diagnoses of Severe protein-calorie malnutrition.    This patient is being managed with:   Diet NPO after Midnight-     NPO Start Date: 20-Apr-2021   NPO Start Time: 23:59  Entered: Apr 22 2021 12:03PM    Diet NPO with Tube Feed-  Tube Feeding Modality: Nasogastric  Glucerna 1.5 Leonardo  Total Volume for 24 Hours (mL): 600  Continuous  Starting Tube Feed Rate {mL per Hour}: 10  Increase Tube Feed Rate by (mL): 10     Every hour  Until Goal Tube Feed Rate (mL per Hour): 25  Tube Feed Duration (in Hours): 24  Tube Feed Start Time: 02:00  No Carb Prosource (1pkg = 15gms Protein)     Qty per Day:  2  Entered: Apr 1 2021  4:49PM    
This patient has been assessed with a concern for Malnutrition and has been determined to have a diagnosis/diagnoses of Severe protein-calorie malnutrition.    This patient is being managed with:   Diet NPO with Tube Feed-  Tube Feeding Modality: Gastrostomy  Jevity 1.5 Leonardo  Total Volume for 24 Hours (mL): 240  Continuous  Starting Tube Feed Rate {mL per Hour}: 10  Increase Tube Feed Rate by (mL): 10     Every hour  Until Goal Tube Feed Rate (mL per Hour): 10  Tube Feed Duration (in Hours): 24  Tube Feed Start Time: 01:00  No Carb Prosource (1pkg = 15gms Protein)     Qty per Day:  1  Entered: May 15 2021  9:17AM    
This patient has been assessed with a concern for Malnutrition and has been determined to have a diagnosis/diagnoses of Severe protein-calorie malnutrition.    This patient is being managed with:   Diet NPO with Tube Feed-  Tube Feeding Modality: Gastrostomy  Jevity 1.5 Leonardo  Total Volume for 24 Hours (mL): 960  Continuous  Starting Tube Feed Rate {mL per Hour}: 10  Increase Tube Feed Rate by (mL): 10     Every hour  Until Goal Tube Feed Rate (mL per Hour): 40  Tube Feed Duration (in Hours): 24  Tube Feed Start Time: 01:00  Entered: Apr 25 2021 12:21AM    
This patient has been assessed with a concern for Malnutrition and has been determined to have a diagnosis/diagnoses of Severe protein-calorie malnutrition.    This patient is being managed with:   Diet NPO with Tube Feed-  Tube Feeding Modality: Gastrostomy  Jevity 1.5 Leonardo  Total Volume for 24 Hours (mL): 960  Continuous  Starting Tube Feed Rate {mL per Hour}: 10  Increase Tube Feed Rate by (mL): 10     Every hour  Until Goal Tube Feed Rate (mL per Hour): 40  Tube Feed Duration (in Hours): 24  Tube Feed Start Time: 01:00  Entered: Apr 26 2021  9:31AM    
This patient has been assessed with a concern for Malnutrition and has been determined to have a diagnosis/diagnoses of Severe protein-calorie malnutrition.    This patient is being managed with:   Diet NPO with Tube Feed-  Tube Feeding Modality: Gastrostomy  Osmolite 1.2 Leonardo  Total Volume for 24 Hours (mL): 960  Continuous  Starting Tube Feed Rate {mL per Hour}: 10  Increase Tube Feed Rate by (mL): 10     Every hour  Until Goal Tube Feed Rate (mL per Hour): 40  Tube Feed Duration (in Hours): 24  Tube Feed Start Time: 01:00  Entered: May  5 2021 11:39AM    
This patient has been assessed with a concern for Malnutrition and has been determined to have a diagnosis/diagnoses of Severe protein-calorie malnutrition.    This patient is being managed with:   Diet NPO with Tube Feed-  Tube Feeding Modality: Gastrostomy  Vital AF 1.2 Leonardo  Total Volume for 24 Hours (mL): 1200  Continuous  Starting Tube Feed Rate {mL per Hour}: 30  Increase Tube Feed Rate by (mL): 10     Every 8 hours  Until Goal Tube Feed Rate (mL per Hour): 50  Tube Feed Duration (in Hours): 24  Tube Feed Start Time: 12:45  Entered: Jun 2 2021 12:44PM    The following pending diet order is being considered for treatment of Severe protein-calorie malnutrition:  Diet NPO-  Tube Feeding Modality: Gastrostomy  Vital AF 1.2 Leonardo  Total Volume for 24 Hours (mL): 1200  Continuous  Starting Tube Feed Rate {mL per Hour}: 30  Increase Tube Feed Rate by (mL): 20     Every 6 hours  Until Goal Tube Feed Rate (mL per Hour): 50  Tube Feed Duration (in Hours): 24  Tube Feed Start Time: 12:30  Entered: Jun 2 2021 12:20PM  
This patient has been assessed with a concern for Malnutrition and has been determined to have a diagnosis/diagnoses of Severe protein-calorie malnutrition.    This patient is being managed with:   Diet NPO with Tube Feed-  Tube Feeding Modality: Gastrostomy  Vital AF 1.2 Leonardo  Total Volume for 24 Hours (mL): 240  Continuous  Starting Tube Feed Rate {mL per Hour}: 10  Until Goal Tube Feed Rate (mL per Hour): 10  Tube Feed Duration (in Hours): 24  Tube Feed Start Time: 10:00  Entered: May 28 2021  9:34AM    
This patient has been assessed with a concern for Malnutrition and has been determined to have a diagnosis/diagnoses of Severe protein-calorie malnutrition.    This patient is being managed with:   Diet NPO with Tube Feed-  Tube Feeding Modality: Gastrostomy  Vital AF 1.2 Leonardo  Total Volume for 24 Hours (mL): 240  Continuous  Starting Tube Feed Rate {mL per Hour}: 10  Until Goal Tube Feed Rate (mL per Hour): 10  Tube Feed Duration (in Hours): 24  Tube Feed Start Time: 10:00  Entered: May 28 2021  9:34AM    
This patient has been assessed with a concern for Malnutrition and has been determined to have a diagnosis/diagnoses of Severe protein-calorie malnutrition.    This patient is being managed with:   Diet NPO with Tube Feed-  Tube Feeding Modality: Nasogastric  Glucerna 1.5 Lenoardo  Total Volume for 24 Hours (mL): 600  Continuous  Starting Tube Feed Rate {mL per Hour}: 10  Increase Tube Feed Rate by (mL): 10     Every hour  Until Goal Tube Feed Rate (mL per Hour): 25  Tube Feed Duration (in Hours): 24  Tube Feed Start Time: 02:00  No Carb Prosource (1pkg = 15gms Protein)     Qty per Day:  2  Entered: Apr 1 2021  4:49PM    
This patient has been assessed with a concern for Malnutrition and has been determined to have a diagnosis/diagnoses of Severe protein-calorie malnutrition.    This patient is being managed with:   Diet NPO with Tube Feed-  Tube Feeding Modality: Nasogastric  Glucerna 1.5 Leonardo  Total Volume for 24 Hours (mL): 600  Continuous  Starting Tube Feed Rate {mL per Hour}: 10  Increase Tube Feed Rate by (mL): 10     Every hour  Until Goal Tube Feed Rate (mL per Hour): 25  Tube Feed Duration (in Hours): 24  Tube Feed Start Time: 02:00  No Carb Prosource (1pkg = 15gms Protein)     Qty per Day:  2  Entered: Apr 1 2021  4:49PM    
This patient has been assessed with a concern for Malnutrition and has been determined to have a diagnosis/diagnoses of Severe protein-calorie malnutrition.    This patient is being managed with:   Diet NPO with Tube Feed-  Tube Feeding Modality: Nasogastric  Jevity 1.5 Leonardo  Total Volume for 24 Hours (mL): 480  Continuous  Starting Tube Feed Rate {mL per Hour}: 10  Increase Tube Feed Rate by (mL): 10     Every hour  Until Goal Tube Feed Rate (mL per Hour): 20  Tube Feed Duration (in Hours): 24  Tube Feed Start Time: 10:00  No Carb Prosource TF     Qty per Day:  bid  Entered: May 26 2021 10:56AM    
This patient has been assessed with a concern for Malnutrition and has been determined to have a diagnosis/diagnoses of Severe protein-calorie malnutrition.    This patient is being managed with:   Diet NPO-  Entered: May 27 2021  7:54AM    
This patient has been assessed with a concern for Malnutrition and has been determined to have a diagnosis/diagnoses of Severe protein-calorie malnutrition.    This patient is being managed with:   Diet NPO-  Except Medications  Entered: May 21 2021 11:27AM    
This patient has been assessed with a concern for Malnutrition and has been determined to have a diagnosis/diagnoses of Moderate protein-calorie malnutrition.    This patient is being managed with:   Diet Regular-  Dysphagia 2 Mechanical Soft Thin Liquids ([id number])  Supplement Feeding Modality:  Oral  Ensure Enlive Cans or Servings Per Day:  1       Frequency:  Two Times a day  Entered: Mar 21 2021  2:29PM    
This patient has been assessed with a concern for Malnutrition and has been determined to have a diagnosis/diagnoses of Moderate protein-calorie malnutrition.    This patient is being managed with:   Diet Regular-  Dysphagia 2 Mechanical Soft Thin Liquids ([id number])  Supplement Feeding Modality:  Oral  Ensure Enlive Cans or Servings Per Day:  1       Frequency:  Two Times a day  Entered: Mar 21 2021  2:29PM    
This patient has been assessed with a concern for Malnutrition and has been determined to have a diagnosis/diagnoses of Moderate protein-calorie malnutrition.    This patient is being managed with:   Diet Regular-  Dysphagia 2 Mechanical Soft Thin Liquids ([id number])  Supplement Feeding Modality:  Oral  Ensure Enlive Cans or Servings Per Day:  1       Frequency:  Two Times a day  Entered: Mar 21 2021  2:29PM    
This patient has been assessed with a concern for Malnutrition and has been determined to have a diagnosis/diagnoses of Moderate protein-calorie malnutrition.    This patient is being managed with:   Diet Regular-  Dysphagia 2 Mechanical Soft Thin Liquids ([id number])  Supplement Feeding Modality:  Oral  Ensure Enlive Cans or Servings Per Day:  1       Frequency:  Two Times a day  Entered: Mar 21 2021  2:29PM    
This patient has been assessed with a concern for Malnutrition and has been determined to have a diagnosis/diagnoses of Severe protein-calorie malnutrition.    This patient is being managed with:   Diet NPO with Tube Feed-  Tube Feeding Modality: Gastrostomy  Jevity 1.5 Leonardo  Total Volume for 24 Hours (mL): 240  Continuous  Starting Tube Feed Rate {mL per Hour}: 10  Increase Tube Feed Rate by (mL): 10     Every hour  Until Goal Tube Feed Rate (mL per Hour): 10  Tube Feed Duration (in Hours): 24  Tube Feed Start Time: 01:00  No Carb Prosource (1pkg = 15gms Protein)     Qty per Day:  1  Entered: May 15 2021  9:17AM    
This patient has been assessed with a concern for Malnutrition and has been determined to have a diagnosis/diagnoses of Severe protein-calorie malnutrition.    This patient is being managed with:   Diet NPO with Tube Feed-  Tube Feeding Modality: Gastrostomy  Jevity 1.5 Leonardo  Total Volume for 24 Hours (mL): 960  Continuous  Starting Tube Feed Rate {mL per Hour}: 10  Increase Tube Feed Rate by (mL): 10     Every hour  Until Goal Tube Feed Rate (mL per Hour): 40  Tube Feed Duration (in Hours): 24  Tube Feed Start Time: 01:00  Entered: Apr 26 2021  9:31AM    
This patient has been assessed with a concern for Malnutrition and has been determined to have a diagnosis/diagnoses of Severe protein-calorie malnutrition.    This patient is being managed with:   Diet NPO with Tube Feed-  Tube Feeding Modality: Gastrostomy  Jevity 1.5 Leonardo  Total Volume for 24 Hours (mL): 960  Continuous  Starting Tube Feed Rate {mL per Hour}: 10  Increase Tube Feed Rate by (mL): 10     Every hour  Until Goal Tube Feed Rate (mL per Hour): 40  Tube Feed Duration (in Hours): 24  Tube Feed Start Time: 01:00  Entered: Apr 26 2021  9:31AM    
This patient has been assessed with a concern for Malnutrition and has been determined to have a diagnosis/diagnoses of Severe protein-calorie malnutrition.    This patient is being managed with:   Diet NPO with Tube Feed-  Tube Feeding Modality: Gastrostomy  Jevity 1.5 Leonardo  Total Volume for 24 Hours (mL): 960  Continuous  Starting Tube Feed Rate {mL per Hour}: 10  Increase Tube Feed Rate by (mL): 10     Every hour  Until Goal Tube Feed Rate (mL per Hour): 40  Tube Feed Duration (in Hours): 24  Tube Feed Start Time: 01:00  No Carb Prosource (1pkg = 15gms Protein)     Qty per Day:  2  Entered: May 12 2021  3:04PM    
This patient has been assessed with a concern for Malnutrition and has been determined to have a diagnosis/diagnoses of Severe protein-calorie malnutrition.    This patient is being managed with:   Diet NPO with Tube Feed-  Tube Feeding Modality: Gastrostomy  Vital AF 1.2 Leonardo  Total Volume for 24 Hours (mL): 1200  Continuous  Starting Tube Feed Rate {mL per Hour}: 30  Increase Tube Feed Rate by (mL): 10     Every 8 hours  Until Goal Tube Feed Rate (mL per Hour): 50  Tube Feed Duration (in Hours): 24  Tube Feed Start Time: 12:45  Entered: Jun 2 2021 12:44PM    The following pending diet order is being considered for treatment of Severe protein-calorie malnutrition:  Diet NPO-  Tube Feeding Modality: Gastrostomy  Vital AF 1.2 Leonardo  Total Volume for 24 Hours (mL): 1200  Continuous  Starting Tube Feed Rate {mL per Hour}: 30  Increase Tube Feed Rate by (mL): 20     Every 6 hours  Until Goal Tube Feed Rate (mL per Hour): 50  Tube Feed Duration (in Hours): 24  Tube Feed Start Time: 12:30  Entered: Jun 2 2021 12:20PM  
This patient has been assessed with a concern for Malnutrition and has been determined to have a diagnosis/diagnoses of Severe protein-calorie malnutrition.    This patient is being managed with:   Diet NPO with Tube Feed-  Tube Feeding Modality: Gastrostomy  Vital AF 1.2 Leonardo  Total Volume for 24 Hours (mL): 1200  Continuous  Starting Tube Feed Rate {mL per Hour}: 30  Increase Tube Feed Rate by (mL): 10     Every 8 hours  Until Goal Tube Feed Rate (mL per Hour): 50  Tube Feed Duration (in Hours): 24  Tube Feed Start Time: 12:45  Entered: Jun 2 2021 12:44PM    The following pending diet order is being considered for treatment of Severe protein-calorie malnutrition:  Diet NPO-  Tube Feeding Modality: Gastrostomy  Vital AF 1.2 Leonardo  Total Volume for 24 Hours (mL): 1200  Continuous  Starting Tube Feed Rate {mL per Hour}: 30  Increase Tube Feed Rate by (mL): 20     Every 6 hours  Until Goal Tube Feed Rate (mL per Hour): 50  Tube Feed Duration (in Hours): 24  Tube Feed Start Time: 12:30  Entered: Jun 2 2021 12:20PM  
This patient has been assessed with a concern for Malnutrition and has been determined to have a diagnosis/diagnoses of Severe protein-calorie malnutrition.    This patient is being managed with:   Diet NPO with Tube Feed-  Tube Feeding Modality: Gastrostomy  Vital AF 1.2 Leonardo  Total Volume for 24 Hours (mL): 240  Continuous  Starting Tube Feed Rate {mL per Hour}: 10  Until Goal Tube Feed Rate (mL per Hour): 10  Tube Feed Duration (in Hours): 24  Tube Feed Start Time: 10:00  Entered: May 28 2021  9:34AM    
This patient has been assessed with a concern for Malnutrition and has been determined to have a diagnosis/diagnoses of Severe protein-calorie malnutrition.    This patient is being managed with:   Diet NPO with Tube Feed-  Tube Feeding Modality: Nasogastric  Glucerna 1.5 Leonardo  Total Volume for 24 Hours (mL): 600  Continuous  Starting Tube Feed Rate {mL per Hour}: 10  Increase Tube Feed Rate by (mL): 10     Every hour  Until Goal Tube Feed Rate (mL per Hour): 25  Tube Feed Duration (in Hours): 24  Tube Feed Start Time: 02:00  No Carb Prosource (1pkg = 15gms Protein)     Qty per Day:  2  Entered: Apr 1 2021  4:49PM    
This patient has been assessed with a concern for Malnutrition and has been determined to have a diagnosis/diagnoses of Severe protein-calorie malnutrition.    This patient is being managed with:   Diet NPO with Tube Feed-  Tube Feeding Modality: Nasogastric  Jevity 1.5 Leonardo  Total Volume for 24 Hours (mL): 240  Continuous  Starting Tube Feed Rate {mL per Hour}: 5  Increase Tube Feed Rate by (mL): 5     Every hour  Until Goal Tube Feed Rate (mL per Hour): 10  Tube Feed Duration (in Hours): 24  Tube Feed Start Time: 10:00  Entered: May 24 2021  9:27AM    
This patient has been assessed with a concern for Malnutrition and has been determined to have a diagnosis/diagnoses of Severe protein-calorie malnutrition.    This patient is being managed with:   Diet NPO-  Entered: Apr 23 2021  6:59PM    
This patient has been assessed with a concern for Malnutrition and has been determined to have a diagnosis/diagnoses of Severe protein-calorie malnutrition.    This patient is being managed with:   Diet NPO-  Entered: May  4 2021  1:00AM    
This patient has been assessed with a concern for Malnutrition and has been determined to have a diagnosis/diagnoses of Moderate protein-calorie malnutrition.    This patient is being managed with:   Diet NPO with Tube Feed-  Tube Feeding Modality: Nasogastric  Jevity 1.5 Leonardo  Total Volume for 24 Hours (mL): 960  Continuous  Starting Tube Feed Rate {mL per Hour}: 10  Increase Tube Feed Rate by (mL): 10     Every hour  Until Goal Tube Feed Rate (mL per Hour): 40  Tube Feed Duration (in Hours): 24  Tube Feed Start Time: 02:00  Entered: Mar 30 2021  1:48AM    
This patient has been assessed with a concern for Malnutrition and has been determined to have a diagnosis/diagnoses of Moderate protein-calorie malnutrition.    This patient is being managed with:   Diet Regular-  Dysphagia 2 Mechanical Soft Thin Liquids ([id number])  Supplement Feeding Modality:  Oral  Ensure Enlive Cans or Servings Per Day:  1       Frequency:  Two Times a day  Entered: Mar 21 2021  2:29PM    
This patient has been assessed with a concern for Malnutrition and has been determined to have a diagnosis/diagnoses of Moderate protein-calorie malnutrition.    This patient is being managed with:   Diet Regular-  Dysphagia 2 Mechanical Soft Thin Liquids ([id number])  Supplement Feeding Modality:  Oral  Ensure Enlive Cans or Servings Per Day:  1       Frequency:  Two Times a day  Entered: Mar 21 2021  2:29PM    
This patient has been assessed with a concern for Malnutrition and has been determined to have a diagnosis/diagnoses of Moderate protein-calorie malnutrition.    This patient is being managed with:   Diet Regular-  Dysphagia 2 Mechanical Soft Thin Liquids ([id number])  Supplement Feeding Modality:  Oral  Ensure Enlive Cans or Servings Per Day:  1       Frequency:  Two Times a day  Entered: Mar 21 2021  2:29PM    
This patient has been assessed with a concern for Malnutrition and has been determined to have a diagnosis/diagnoses of Moderate protein-calorie malnutrition.    This patient is being managed with:   Diet Regular-  Dysphagia 2 Mechanical Soft Thin Liquids ([id number])  Supplement Feeding Modality:  Oral  Ensure Enlive Cans or Servings Per Day:  1       Frequency:  Two Times a day  Entered: Mar 21 2021  2:29PM    
This patient has been assessed with a concern for Malnutrition and has been determined to have a diagnosis/diagnoses of Moderate protein-calorie malnutrition.    This patient is being managed with:   Diet Regular-  Dysphagia 2 Mechanical Soft Thin Liquids ([id number])  Supplement Feeding Modality:  Oral  Ensure Enlive Cans or Servings Per Day:  1       Frequency:  Two Times a day  Entered: Mar 21 2021  2:29PM    
This patient has been assessed with a concern for Malnutrition and has been determined to have a diagnosis/diagnoses of Moderate protein-calorie malnutrition.    This patient is being managed with:   Diet Regular-  Dysphagia 2 Mechanical Soft Thin Liquids ([id number])  Supplement Feeding Modality:  Oral  Ensure Enlive Cans or Servings Per Day:  1       Frequency:  Two Times a day  Entered: Mar 21 2021  2:29PM    
This patient has been assessed with a concern for Malnutrition and has been determined to have a diagnosis/diagnoses of Moderate protein-calorie malnutrition.    This patient is being managed with:   Diet Regular-  Supplement Feeding Modality:  Oral  Ensure Enlive Cans or Servings Per Day:  1       Frequency:  Two Times a day  Entered: Mar 14 2021  5:09PM    
This patient has been assessed with a concern for Malnutrition and has been determined to have a diagnosis/diagnoses of Severe protein-calorie malnutrition.    This patient is being managed with:   Diet NPO with Tube Feed-  Tube Feeding Modality: Gastrostomy  Jevity 1.5 Leonardo  Total Volume for 24 Hours (mL): 240  Continuous  Starting Tube Feed Rate {mL per Hour}: 10  Increase Tube Feed Rate by (mL): 10     Every hour  Until Goal Tube Feed Rate (mL per Hour): 10  Tube Feed Duration (in Hours): 24  Tube Feed Start Time: 01:00  No Carb Prosource (1pkg = 15gms Protein)     Qty per Day:  1  Entered: May 15 2021  9:17AM    
This patient has been assessed with a concern for Malnutrition and has been determined to have a diagnosis/diagnoses of Severe protein-calorie malnutrition.    This patient is being managed with:   Diet NPO with Tube Feed-  Tube Feeding Modality: Gastrostomy  Jevity 1.5 Leonardo  Total Volume for 24 Hours (mL): 240  Continuous  Starting Tube Feed Rate {mL per Hour}: 10  Increase Tube Feed Rate by (mL): 10     Every hour  Until Goal Tube Feed Rate (mL per Hour): 10  Tube Feed Duration (in Hours): 24  Tube Feed Start Time: 01:00  No Carb Prosource (1pkg = 15gms Protein)     Qty per Day:  1  Entered: May 15 2021  9:17AM    
This patient has been assessed with a concern for Malnutrition and has been determined to have a diagnosis/diagnoses of Severe protein-calorie malnutrition.    This patient is being managed with:   Diet NPO with Tube Feed-  Tube Feeding Modality: Gastrostomy  Jevity 1.5 Leonardo  Total Volume for 24 Hours (mL): 960  Continuous  Starting Tube Feed Rate {mL per Hour}: 10  Increase Tube Feed Rate by (mL): 10     Every hour  Until Goal Tube Feed Rate (mL per Hour): 40  Tube Feed Duration (in Hours): 24  Tube Feed Start Time: 01:00  Entered: Apr 25 2021 12:21AM      This patient has been assessed with a concern for Malnutrition and has been determined to have a diagnosis/diagnoses of Severe protein-calorie malnutrition.    This patient is being managed with:   Diet NPO with Tube Feed-  Tube Feeding Modality: Gastrostomy  Jevity 1.5 Leonardo  Total Volume for 24 Hours (mL): 960  Continuous  Starting Tube Feed Rate {mL per Hour}: 10  Increase Tube Feed Rate by (mL): 10     Every hour  Until Goal Tube Feed Rate (mL per Hour): 40  Tube Feed Duration (in Hours): 24  Tube Feed Start Time: 01:00  Entered: Apr 25 2021 12:21AM    
This patient has been assessed with a concern for Malnutrition and has been determined to have a diagnosis/diagnoses of Severe protein-calorie malnutrition.    This patient is being managed with:   Diet NPO with Tube Feed-  Tube Feeding Modality: Gastrostomy  Jevity 1.5 Leonardo  Total Volume for 24 Hours (mL): 960  Continuous  Starting Tube Feed Rate {mL per Hour}: 10  Increase Tube Feed Rate by (mL): 10     Every hour  Until Goal Tube Feed Rate (mL per Hour): 40  Tube Feed Duration (in Hours): 24  Tube Feed Start Time: 01:00  Entered: Apr 26 2021  9:31AM    
This patient has been assessed with a concern for Malnutrition and has been determined to have a diagnosis/diagnoses of Severe protein-calorie malnutrition.    This patient is being managed with:   Diet NPO with Tube Feed-  Tube Feeding Modality: Gastrostomy  Jevity 1.5 Leonardo  Total Volume for 24 Hours (mL): 960  Continuous  Starting Tube Feed Rate {mL per Hour}: 10  Increase Tube Feed Rate by (mL): 10     Every hour  Until Goal Tube Feed Rate (mL per Hour): 40  Tube Feed Duration (in Hours): 24  Tube Feed Start Time: 01:00  Entered: May  4 2021 11:14AM    
This patient has been assessed with a concern for Malnutrition and has been determined to have a diagnosis/diagnoses of Severe protein-calorie malnutrition.    This patient is being managed with:   Diet NPO with Tube Feed-  Tube Feeding Modality: Gastrostomy  Jevity 1.5 Leonardo  Total Volume for 24 Hours (mL): 960  Continuous  Starting Tube Feed Rate {mL per Hour}: 10  Increase Tube Feed Rate by (mL): 10     Every hour  Until Goal Tube Feed Rate (mL per Hour): 40  Tube Feed Duration (in Hours): 24  Tube Feed Start Time: 01:00  No Carb Prosource (1pkg = 15gms Protein)     Qty per Day:  2  Entered: May 10 2021  3:24PM    
This patient has been assessed with a concern for Malnutrition and has been determined to have a diagnosis/diagnoses of Severe protein-calorie malnutrition.    This patient is being managed with:   Diet NPO with Tube Feed-  Tube Feeding Modality: Gastrostomy  Jevity 1.5 Leonardo  Total Volume for 24 Hours (mL): 960  Continuous  Starting Tube Feed Rate {mL per Hour}: 10  Increase Tube Feed Rate by (mL): 10     Every hour  Until Goal Tube Feed Rate (mL per Hour): 40  Tube Feed Duration (in Hours): 24  Tube Feed Start Time: 01:00  No Carb Prosource (1pkg = 15gms Protein)     Qty per Day:  2  Entered: May 12 2021  3:04PM    
This patient has been assessed with a concern for Malnutrition and has been determined to have a diagnosis/diagnoses of Severe protein-calorie malnutrition.    This patient is being managed with:   Diet NPO with Tube Feed-  Tube Feeding Modality: Gastrostomy  Osmolite 1.2 Leonardo  Total Volume for 24 Hours (mL): 960  Continuous  Starting Tube Feed Rate {mL per Hour}: 10  Increase Tube Feed Rate by (mL): 10     Every hour  Until Goal Tube Feed Rate (mL per Hour): 40  Tube Feed Duration (in Hours): 24  Tube Feed Start Time: 01:00  Entered: May  5 2021 11:39AM    
This patient has been assessed with a concern for Malnutrition and has been determined to have a diagnosis/diagnoses of Severe protein-calorie malnutrition.    This patient is being managed with:   Diet NPO with Tube Feed-  Tube Feeding Modality: Gastrostomy  Osmolite 1.2 Leonrado  Total Volume for 24 Hours (mL): 960  Continuous  Starting Tube Feed Rate {mL per Hour}: 10  Increase Tube Feed Rate by (mL): 10     Every hour  Until Goal Tube Feed Rate (mL per Hour): 40  Tube Feed Duration (in Hours): 24  Tube Feed Start Time: 01:00  Entered: May  5 2021 11:39AM    
This patient has been assessed with a concern for Malnutrition and has been determined to have a diagnosis/diagnoses of Severe protein-calorie malnutrition.    This patient is being managed with:   Diet NPO with Tube Feed-  Tube Feeding Modality: Gastrostomy  Vital AF 1.2 Leonardo  Total Volume for 24 Hours (mL): 1200  Continuous  Starting Tube Feed Rate {mL per Hour}: 30  Increase Tube Feed Rate by (mL): 10     Every 8 hours  Until Goal Tube Feed Rate (mL per Hour): 50  Tube Feed Duration (in Hours): 24  Tube Feed Start Time: 12:45  Entered: Jun 2 2021 12:44PM    The following pending diet order is being considered for treatment of Severe protein-calorie malnutrition:  Diet NPO-  Tube Feeding Modality: Gastrostomy  Vital AF 1.2 Leonardo  Total Volume for 24 Hours (mL): 1200  Continuous  Starting Tube Feed Rate {mL per Hour}: 30  Increase Tube Feed Rate by (mL): 20     Every 6 hours  Until Goal Tube Feed Rate (mL per Hour): 50  Tube Feed Duration (in Hours): 24  Tube Feed Start Time: 12:30  Entered: Jun 2 2021 12:20PM  
This patient has been assessed with a concern for Malnutrition and has been determined to have a diagnosis/diagnoses of Severe protein-calorie malnutrition.    This patient is being managed with:   Diet NPO with Tube Feed-  Tube Feeding Modality: Gastrostomy  Vital AF 1.2 Leonardo  Total Volume for 24 Hours (mL): 1200  Continuous  Starting Tube Feed Rate {mL per Hour}: 30  Increase Tube Feed Rate by (mL): 10     Every 8 hours  Until Goal Tube Feed Rate (mL per Hour): 50  Tube Feed Duration (in Hours): 24  Tube Feed Start Time: 12:45  Entered: Jun 2 2021 12:44PM    The following pending diet order is being considered for treatment of Severe protein-calorie malnutrition:  Diet NPO-  Tube Feeding Modality: Gastrostomy  Vital AF 1.2 Leonardo  Total Volume for 24 Hours (mL): 1200  Continuous  Starting Tube Feed Rate {mL per Hour}: 30  Increase Tube Feed Rate by (mL): 20     Every 6 hours  Until Goal Tube Feed Rate (mL per Hour): 50  Tube Feed Duration (in Hours): 24  Tube Feed Start Time: 12:30  Entered: Jun 2 2021 12:20PM  
This patient has been assessed with a concern for Malnutrition and has been determined to have a diagnosis/diagnoses of Severe protein-calorie malnutrition.    This patient is being managed with:   Diet NPO with Tube Feed-  Tube Feeding Modality: Gastrostomy  Vital AF 1.2 Leonardo  Total Volume for 24 Hours (mL): 240  Continuous  Starting Tube Feed Rate {mL per Hour}: 10  Until Goal Tube Feed Rate (mL per Hour): 10  Tube Feed Duration (in Hours): 24  Tube Feed Start Time: 10:00  Entered: May 28 2021  9:34AM    
This patient has been assessed with a concern for Malnutrition and has been determined to have a diagnosis/diagnoses of Severe protein-calorie malnutrition.    This patient is being managed with:   Diet NPO with Tube Feed-  Tube Feeding Modality: Nasogastric  Glucerna 1.5 Leonardo  Total Volume for 24 Hours (mL): 600  Continuous  Starting Tube Feed Rate {mL per Hour}: 10  Increase Tube Feed Rate by (mL): 10     Every hour  Until Goal Tube Feed Rate (mL per Hour): 25  Tube Feed Duration (in Hours): 24  Tube Feed Start Time: 02:00  No Carb Prosource (1pkg = 15gms Protein)     Qty per Day:  2  Entered: Apr 1 2021  4:49PM    
This patient has been assessed with a concern for Malnutrition and has been determined to have a diagnosis/diagnoses of Severe protein-calorie malnutrition.    This patient is being managed with:   Diet NPO with Tube Feed-  Tube Feeding Modality: Nasogastric  Jevity 1.5 Leonardo  Total Volume for 24 Hours (mL): 480  Continuous  Starting Tube Feed Rate {mL per Hour}: 10  Increase Tube Feed Rate by (mL): 10     Every hour  Until Goal Tube Feed Rate (mL per Hour): 20  Tube Feed Duration (in Hours): 24  Tube Feed Start Time: 10:00  Entered: May 25 2021 10:13AM    
This patient has been assessed with a concern for Malnutrition and has been determined to have a diagnosis/diagnoses of Severe protein-calorie malnutrition.    This patient is being managed with:   Diet NPO-  Entered: Apr 23 2021  6:59PM    
This patient has been assessed with a concern for Malnutrition and has been determined to have a diagnosis/diagnoses of Severe protein-calorie malnutrition.    This patient is being managed with:   Diet NPO-  Entered: Apr 23 2021  6:59PM    
This patient has been assessed with a concern for Malnutrition and has been determined to have a diagnosis/diagnoses of Severe protein-calorie malnutrition.    This patient is being managed with:   Diet NPO-  Except Medications  Entered: May 11 2021  3:25PM    
This patient has been assessed with a concern for Malnutrition and has been determined to have a diagnosis/diagnoses of Severe protein-calorie malnutrition.    This patient is being managed with:   Diet NPO-  Except Medications  Entered: May 13 2021  7:16AM    
This patient has been assessed with a concern for Malnutrition and has been determined to have a diagnosis/diagnoses of Moderate protein-calorie malnutrition.    This patient is being managed with:   Diet Regular-  Dysphagia 2 Mechanical Soft Thin Liquids ([id number])  Supplement Feeding Modality:  Oral  Ensure Enlive Cans or Servings Per Day:  1       Frequency:  Two Times a day  Entered: Mar 21 2021  2:29PM    
This patient has been assessed with a concern for Malnutrition and has been determined to have a diagnosis/diagnoses of Moderate protein-calorie malnutrition.    This patient is being managed with:   Diet Regular-  Dysphagia 2 Mechanical Soft Thin Liquids ([id number])  Supplement Feeding Modality:  Oral  Ensure Enlive Cans or Servings Per Day:  1       Frequency:  Two Times a day  Entered: Mar 21 2021  2:29PM    
This patient has been assessed with a concern for Malnutrition and has been determined to have a diagnosis/diagnoses of Moderate protein-calorie malnutrition.    This patient is being managed with:   Diet Regular-  Dysphagia 2 Mechanical Soft Thin Liquids ([id number])  Supplement Feeding Modality:  Oral  Ensure Enlive Cans or Servings Per Day:  1       Frequency:  Two Times a day  Entered: Mar 21 2021  2:29PM    
This patient has been assessed with a concern for Malnutrition and has been determined to have a diagnosis/diagnoses of Moderate protein-calorie malnutrition.    This patient is being managed with:   Diet Regular-  Supplement Feeding Modality:  Oral  Ensure Enlive Cans or Servings Per Day:  1       Frequency:  Two Times a day  Entered: Mar 14 2021  5:09PM    
This patient has been assessed with a concern for Malnutrition and has been determined to have a diagnosis/diagnoses of Severe protein-calorie malnutrition.    This patient is being managed with:   Diet NPO after Midnight-     NPO Start Date: 20-Apr-2021   NPO Start Time: 23:59  Entered: Apr 20 2021  6:06PM    Diet NPO with Tube Feed-  Tube Feeding Modality: Nasogastric  Glucerna 1.5 Leonardo  Total Volume for 24 Hours (mL): 600  Continuous  Starting Tube Feed Rate {mL per Hour}: 10  Increase Tube Feed Rate by (mL): 10     Every hour  Until Goal Tube Feed Rate (mL per Hour): 25  Tube Feed Duration (in Hours): 24  Tube Feed Start Time: 02:00  No Carb Prosource (1pkg = 15gms Protein)     Qty per Day:  2  Entered: Apr 1 2021  4:49PM    
This patient has been assessed with a concern for Malnutrition and has been determined to have a diagnosis/diagnoses of Severe protein-calorie malnutrition.    This patient is being managed with:   Diet NPO after Midnight-     NPO Start Date: 20-Apr-2021   NPO Start Time: 23:59  Entered: Apr 20 2021  6:06PM    Diet NPO with Tube Feed-  Tube Feeding Modality: Nasogastric  Glucerna 1.5 Leonardo  Total Volume for 24 Hours (mL): 600  Continuous  Starting Tube Feed Rate {mL per Hour}: 10  Increase Tube Feed Rate by (mL): 10     Every hour  Until Goal Tube Feed Rate (mL per Hour): 25  Tube Feed Duration (in Hours): 24  Tube Feed Start Time: 02:00  No Carb Prosource (1pkg = 15gms Protein)     Qty per Day:  2  Entered: Apr 1 2021  4:49PM    
This patient has been assessed with a concern for Malnutrition and has been determined to have a diagnosis/diagnoses of Severe protein-calorie malnutrition.    This patient is being managed with:   Diet NPO after Midnight-     NPO Start Date: 20-Apr-2021   NPO Start Time: 23:59  Entered: Apr 22 2021 12:03PM    Diet NPO with Tube Feed-  Tube Feeding Modality: Nasogastric  Glucerna 1.5 Leonardo  Total Volume for 24 Hours (mL): 600  Continuous  Starting Tube Feed Rate {mL per Hour}: 10  Increase Tube Feed Rate by (mL): 10     Every hour  Until Goal Tube Feed Rate (mL per Hour): 25  Tube Feed Duration (in Hours): 24  Tube Feed Start Time: 02:00  No Carb Prosource (1pkg = 15gms Protein)     Qty per Day:  2  Entered: Apr 1 2021  4:49PM    
This patient has been assessed with a concern for Malnutrition and has been determined to have a diagnosis/diagnoses of Severe protein-calorie malnutrition.    This patient is being managed with:   Diet NPO with Tube Feed-  Tube Feeding Modality: Gastrostomy  Jevity 1.5 Leonardo  Total Volume for 24 Hours (mL): 240  Continuous  Starting Tube Feed Rate {mL per Hour}: 10  Increase Tube Feed Rate by (mL): 10     Every hour  Until Goal Tube Feed Rate (mL per Hour): 10  Tube Feed Duration (in Hours): 24  Tube Feed Start Time: 01:00  No Carb Prosource (1pkg = 15gms Protein)     Qty per Day:  1  Entered: May 15 2021  9:17AM    
This patient has been assessed with a concern for Malnutrition and has been determined to have a diagnosis/diagnoses of Severe protein-calorie malnutrition.    This patient is being managed with:   Diet NPO with Tube Feed-  Tube Feeding Modality: Gastrostomy  Jevity 1.5 Leonardo  Total Volume for 24 Hours (mL): 960  Continuous  Starting Tube Feed Rate {mL per Hour}: 10  Increase Tube Feed Rate by (mL): 10     Every hour  Until Goal Tube Feed Rate (mL per Hour): 40  Tube Feed Duration (in Hours): 24  Tube Feed Start Time: 01:00  Entered: Apr 25 2021 12:21AM    
This patient has been assessed with a concern for Malnutrition and has been determined to have a diagnosis/diagnoses of Severe protein-calorie malnutrition.    This patient is being managed with:   Diet NPO with Tube Feed-  Tube Feeding Modality: Gastrostomy  Jevity 1.5 Leonardo  Total Volume for 24 Hours (mL): 960  Continuous  Starting Tube Feed Rate {mL per Hour}: 10  Increase Tube Feed Rate by (mL): 10     Every hour  Until Goal Tube Feed Rate (mL per Hour): 40  Tube Feed Duration (in Hours): 24  Tube Feed Start Time: 01:00  Entered: Apr 26 2021  9:31AM    
This patient has been assessed with a concern for Malnutrition and has been determined to have a diagnosis/diagnoses of Severe protein-calorie malnutrition.    This patient is being managed with:   Diet NPO with Tube Feed-  Tube Feeding Modality: Gastrostomy  Jevity 1.5 Leonardo  Total Volume for 24 Hours (mL): 960  Continuous  Starting Tube Feed Rate {mL per Hour}: 10  Increase Tube Feed Rate by (mL): 10     Every hour  Until Goal Tube Feed Rate (mL per Hour): 40  Tube Feed Duration (in Hours): 24  Tube Feed Start Time: 01:00  No Carb Prosource (1pkg = 15gms Protein)     Qty per Day:  1  Entered: May 18 2021  7:24AM    
This patient has been assessed with a concern for Malnutrition and has been determined to have a diagnosis/diagnoses of Severe protein-calorie malnutrition.    This patient is being managed with:   Diet NPO with Tube Feed-  Tube Feeding Modality: Gastrostomy  Jevity 1.5 Leonardo  Total Volume for 24 Hours (mL): 960  Continuous  Starting Tube Feed Rate {mL per Hour}: 10  Increase Tube Feed Rate by (mL): 10     Every hour  Until Goal Tube Feed Rate (mL per Hour): 40  Tube Feed Duration (in Hours): 24  Tube Feed Start Time: 01:00  No Carb Prosource (1pkg = 15gms Protein)     Qty per Day:  2  Entered: May 10 2021  3:24PM    
This patient has been assessed with a concern for Malnutrition and has been determined to have a diagnosis/diagnoses of Severe protein-calorie malnutrition.    This patient is being managed with:   Diet NPO with Tube Feed-  Tube Feeding Modality: Gastrostomy  Osmolite 1.2 Leonardo  Total Volume for 24 Hours (mL): 960  Continuous  Starting Tube Feed Rate {mL per Hour}: 10  Increase Tube Feed Rate by (mL): 10     Every hour  Until Goal Tube Feed Rate (mL per Hour): 40  Tube Feed Duration (in Hours): 24  Tube Feed Start Time: 01:00  Entered: May  5 2021 11:39AM    
This patient has been assessed with a concern for Malnutrition and has been determined to have a diagnosis/diagnoses of Severe protein-calorie malnutrition.    This patient is being managed with:   Diet NPO with Tube Feed-  Tube Feeding Modality: Gastrostomy  Osmolite 1.2 Leonardo  Total Volume for 24 Hours (mL): 960  Continuous  Starting Tube Feed Rate {mL per Hour}: 10  Increase Tube Feed Rate by (mL): 10     Every hour  Until Goal Tube Feed Rate (mL per Hour): 40  Tube Feed Duration (in Hours): 24  Tube Feed Start Time: 01:00  Entered: May  5 2021 11:39AM    
This patient has been assessed with a concern for Malnutrition and has been determined to have a diagnosis/diagnoses of Severe protein-calorie malnutrition.    This patient is being managed with:   Diet NPO with Tube Feed-  Tube Feeding Modality: Gastrostomy  Vital AF 1.2 Leonardo  Total Volume for 24 Hours (mL): 1200  Continuous  Starting Tube Feed Rate {mL per Hour}: 30  Increase Tube Feed Rate by (mL): 10     Every 8 hours  Until Goal Tube Feed Rate (mL per Hour): 50  Tube Feed Duration (in Hours): 24  Tube Feed Start Time: 12:45  Entered: Jun 2 2021 12:44PM    The following pending diet order is being considered for treatment of Severe protein-calorie malnutrition:  Diet NPO-  Tube Feeding Modality: Gastrostomy  Vital AF 1.2 Leonardo  Total Volume for 24 Hours (mL): 1200  Continuous  Starting Tube Feed Rate {mL per Hour}: 30  Increase Tube Feed Rate by (mL): 20     Every 6 hours  Until Goal Tube Feed Rate (mL per Hour): 50  Tube Feed Duration (in Hours): 24  Tube Feed Start Time: 12:30  Entered: Jun 2 2021 12:20PM  
This patient has been assessed with a concern for Malnutrition and has been determined to have a diagnosis/diagnoses of Severe protein-calorie malnutrition.    This patient is being managed with:   Diet NPO with Tube Feed-  Tube Feeding Modality: Gastrostomy  Vital AF 1.2 Leonardo  Total Volume for 24 Hours (mL): 240  Continuous  Starting Tube Feed Rate {mL per Hour}: 10  Until Goal Tube Feed Rate (mL per Hour): 10  Tube Feed Duration (in Hours): 24  Tube Feed Start Time: 10:00  Entered: May 28 2021  9:34AM    
This patient has been assessed with a concern for Malnutrition and has been determined to have a diagnosis/diagnoses of Severe protein-calorie malnutrition.    This patient is being managed with:   Diet NPO with Tube Feed-  Tube Feeding Modality: Nasogastric  Glucerna 1.5 Leonardo  Total Volume for 24 Hours (mL): 600  Continuous  Starting Tube Feed Rate {mL per Hour}: 10  Increase Tube Feed Rate by (mL): 10     Every hour  Until Goal Tube Feed Rate (mL per Hour): 25  Tube Feed Duration (in Hours): 24  Tube Feed Start Time: 02:00  No Carb Prosource (1pkg = 15gms Protein)     Qty per Day:  2  Entered: Apr 1 2021  4:49PM    
This patient has been assessed with a concern for Malnutrition and has been determined to have a diagnosis/diagnoses of Severe protein-calorie malnutrition.    This patient is being managed with:   Diet NPO with Tube Feed-  Tube Feeding Modality: Nasogastric  Jevity 1.5 Leonardo  Total Volume for 24 Hours (mL): 240  Continuous  Starting Tube Feed Rate {mL per Hour}: 5  Increase Tube Feed Rate by (mL): 5     Every hour  Until Goal Tube Feed Rate (mL per Hour): 10  Tube Feed Duration (in Hours): 24  Tube Feed Start Time: 10:00  Entered: May 24 2021  9:27AM    
This patient has been assessed with a concern for Malnutrition and has been determined to have a diagnosis/diagnoses of Severe protein-calorie malnutrition.    This patient is being managed with:   Diet NPO with Tube Feed-  Tube Feeding Modality: Nasogastric  Jevity 1.5 Leonardo  Total Volume for 24 Hours (mL): 240  Continuous  Starting Tube Feed Rate {mL per Hour}: 5  Increase Tube Feed Rate by (mL): 5     Every hour  Until Goal Tube Feed Rate (mL per Hour): 10  Tube Feed Duration (in Hours): 24  Tube Feed Start Time: 10:00  Entered: May 24 2021  9:27AM    
This patient has been assessed with a concern for Malnutrition and has been determined to have a diagnosis/diagnoses of Severe protein-calorie malnutrition.    This patient is being managed with:   Diet NPO with Tube Feed-  Tube Feeding Modality: Nasogastric  Jevity 1.5 Leonardo  Total Volume for 24 Hours (mL): 480  Continuous  Starting Tube Feed Rate {mL per Hour}: 10  Increase Tube Feed Rate by (mL): 10     Every hour  Until Goal Tube Feed Rate (mL per Hour): 20  Tube Feed Duration (in Hours): 24  Tube Feed Start Time: 10:00  No Carb Prosource TF     Qty per Day:  bid  Entered: May 26 2021 10:56AM    
This patient has been assessed with a concern for Malnutrition and has been determined to have a diagnosis/diagnoses of Severe protein-calorie malnutrition.    This patient is being managed with:   Diet NPO-  Entered: May 20 2021 11:05AM    
This patient has been assessed with a concern for Malnutrition and has been determined to have a diagnosis/diagnoses of Severe protein-calorie malnutrition.    This patient is being managed with:   Diet NPO-  Entered: May 20 2021 11:05AM    
This patient has been assessed with a concern for Malnutrition and has been determined to have a diagnosis/diagnoses of Severe protein-calorie malnutrition.    This patient is being managed with:   Diet NPO-  Entered: May 27 2021  7:54AM    
This patient has been assessed with a concern for Malnutrition and has been determined to have a diagnosis/diagnoses of Severe protein-calorie malnutrition.    This patient is being managed with:   Diet NPO-  Except Medications  Entered: May 11 2021  3:25PM    
This patient has been assessed with a concern for Malnutrition and has been determined to have a diagnosis/diagnoses of Moderate protein-calorie malnutrition.    This patient is being managed with:   Diet NPO with Tube Feed-  Tube Feeding Modality: Nasogastric  Jevity 1.5 Leonardo  Total Volume for 24 Hours (mL): 960  Continuous  Starting Tube Feed Rate {mL per Hour}: 10  Increase Tube Feed Rate by (mL): 10     Every hour  Until Goal Tube Feed Rate (mL per Hour): 40  Tube Feed Duration (in Hours): 24  Tube Feed Start Time: 02:00  Entered: Mar 30 2021  1:48AM    
This patient has been assessed with a concern for Malnutrition and has been determined to have a diagnosis/diagnoses of Severe protein-calorie malnutrition.    This patient is being managed with:   Diet NPO after Midnight-     NPO Start Date: 20-Apr-2021   NPO Start Time: 23:59  Entered: Apr 20 2021  6:06PM    Diet NPO with Tube Feed-  Tube Feeding Modality: Nasogastric  Glucerna 1.5 Leonardo  Total Volume for 24 Hours (mL): 600  Continuous  Starting Tube Feed Rate {mL per Hour}: 10  Increase Tube Feed Rate by (mL): 10     Every hour  Until Goal Tube Feed Rate (mL per Hour): 25  Tube Feed Duration (in Hours): 24  Tube Feed Start Time: 02:00  No Carb Prosource (1pkg = 15gms Protein)     Qty per Day:  2  Entered: Apr 1 2021  4:49PM    
This patient has been assessed with a concern for Malnutrition and has been determined to have a diagnosis/diagnoses of Severe protein-calorie malnutrition.    This patient is being managed with:   Diet NPO with Tube Feed-  Tube Feeding Modality: Gastrostomy  Jevity 1.5 Leonardo  Total Volume for 24 Hours (mL): 960  Continuous  Starting Tube Feed Rate {mL per Hour}: 10  Increase Tube Feed Rate by (mL): 10     Every hour  Until Goal Tube Feed Rate (mL per Hour): 40  Tube Feed Duration (in Hours): 24  Tube Feed Start Time: 01:00  No Carb Prosource (1pkg = 15gms Protein)     Qty per Day:  1  Entered: May 18 2021  7:24AM    
This patient has been assessed with a concern for Malnutrition and has been determined to have a diagnosis/diagnoses of Severe protein-calorie malnutrition.    This patient is being managed with:   Diet NPO with Tube Feed-  Tube Feeding Modality: Gastrostomy  Jevity 1.5 Leonardo  Total Volume for 24 Hours (mL): 960  Continuous  Starting Tube Feed Rate {mL per Hour}: 10  Increase Tube Feed Rate by (mL): 10     Every hour  Until Goal Tube Feed Rate (mL per Hour): 40  Tube Feed Duration (in Hours): 24  Tube Feed Start Time: 01:00  No Carb Prosource (1pkg = 15gms Protein)     Qty per Day:  1  Entered: May 18 2021  7:24AM    
This patient has been assessed with a concern for Malnutrition and has been determined to have a diagnosis/diagnoses of Severe protein-calorie malnutrition.    This patient is being managed with:   Diet NPO with Tube Feed-  Tube Feeding Modality: Gastrostomy  Osmolite 1.2 Leonardo  Total Volume for 24 Hours (mL): 960  Continuous  Starting Tube Feed Rate {mL per Hour}: 10  Increase Tube Feed Rate by (mL): 10     Every hour  Until Goal Tube Feed Rate (mL per Hour): 40  Tube Feed Duration (in Hours): 24  Tube Feed Start Time: 01:00  Entered: May  5 2021 11:39AM    
This patient has been assessed with a concern for Malnutrition and has been determined to have a diagnosis/diagnoses of Severe protein-calorie malnutrition.    This patient is being managed with:   Diet NPO with Tube Feed-  Tube Feeding Modality: Gastrostomy  Vital AF 1.2 Leonardo  Total Volume for 24 Hours (mL): 1200  Continuous  Starting Tube Feed Rate {mL per Hour}: 30  Increase Tube Feed Rate by (mL): 10     Every 8 hours  Until Goal Tube Feed Rate (mL per Hour): 50  Tube Feed Duration (in Hours): 24  Tube Feed Start Time: 12:45  Entered: Jun 2 2021 12:44PM    The following pending diet order is being considered for treatment of Severe protein-calorie malnutrition:  Diet NPO-  Tube Feeding Modality: Gastrostomy  Vital AF 1.2 Leonardo  Total Volume for 24 Hours (mL): 1200  Continuous  Starting Tube Feed Rate {mL per Hour}: 30  Increase Tube Feed Rate by (mL): 20     Every 6 hours  Until Goal Tube Feed Rate (mL per Hour): 50  Tube Feed Duration (in Hours): 24  Tube Feed Start Time: 12:30  Entered: Jun 2 2021 12:20PM  
This patient has been assessed with a concern for Malnutrition and has been determined to have a diagnosis/diagnoses of Severe protein-calorie malnutrition.    This patient is being managed with:   Diet NPO with Tube Feed-  Tube Feeding Modality: Gastrostomy  Vital AF 1.2 Leonardo  Total Volume for 24 Hours (mL): 1200  Continuous  Starting Tube Feed Rate {mL per Hour}: 30  Increase Tube Feed Rate by (mL): 10     Every 8 hours  Until Goal Tube Feed Rate (mL per Hour): 50  Tube Feed Duration (in Hours): 24  Tube Feed Start Time: 12:45  Entered: Jun 2 2021 12:44PM    The following pending diet order is being considered for treatment of Severe protein-calorie malnutrition:  Diet NPO-  Tube Feeding Modality: Gastrostomy  Vital AF 1.2 Leonardo  Total Volume for 24 Hours (mL): 1200  Continuous  Starting Tube Feed Rate {mL per Hour}: 30  Increase Tube Feed Rate by (mL): 20     Every 6 hours  Until Goal Tube Feed Rate (mL per Hour): 50  Tube Feed Duration (in Hours): 24  Tube Feed Start Time: 12:30  Entered: Jun 2 2021 12:20PM  
This patient has been assessed with a concern for Malnutrition and has been determined to have a diagnosis/diagnoses of Severe protein-calorie malnutrition.    This patient is being managed with:   Diet NPO with Tube Feed-  Tube Feeding Modality: Gastrostomy  Vital AF 1.2 Leonarod  Total Volume for 24 Hours (mL): 240  Continuous  Starting Tube Feed Rate {mL per Hour}: 10  Until Goal Tube Feed Rate (mL per Hour): 10  Tube Feed Duration (in Hours): 24  Tube Feed Start Time: 10:00  Entered: May 28 2021  9:34AM    
This patient has been assessed with a concern for Malnutrition and has been determined to have a diagnosis/diagnoses of Severe protein-calorie malnutrition.    This patient is being managed with:   Diet NPO with Tube Feed-  Tube Feeding Modality: Nasogastric  Glucerna 1.5 Leonardo  Total Volume for 24 Hours (mL): 600  Continuous  Starting Tube Feed Rate {mL per Hour}: 10  Increase Tube Feed Rate by (mL): 10     Every hour  Until Goal Tube Feed Rate (mL per Hour): 25  Tube Feed Duration (in Hours): 24  Tube Feed Start Time: 02:00  No Carb Prosource (1pkg = 15gms Protein)     Qty per Day:  2  Entered: Apr 1 2021  4:49PM    
This patient has been assessed with a concern for Malnutrition and has been determined to have a diagnosis/diagnoses of Severe protein-calorie malnutrition.    This patient is being managed with:   Diet NPO with Tube Feed-  Tube Feeding Modality: Nasogastric  Jevity 1.5 Leonardo  Total Volume for 24 Hours (mL): 480  Continuous  Starting Tube Feed Rate {mL per Hour}: 10  Increase Tube Feed Rate by (mL): 10     Every hour  Until Goal Tube Feed Rate (mL per Hour): 20  Tube Feed Duration (in Hours): 24  Tube Feed Start Time: 10:00  Entered: May 25 2021 10:13AM    
This patient has been assessed with a concern for Malnutrition and has been determined to have a diagnosis/diagnoses of Severe protein-calorie malnutrition.    This patient is being managed with:   Diet NPO-  Entered: May  4 2021  1:00AM    
This patient has been assessed with a concern for Malnutrition and has been determined to have a diagnosis/diagnoses of Severe protein-calorie malnutrition.    This patient is being managed with:   Diet NPO with Tube Feed-  Tube Feeding Modality: Nasogastric  Glucerna 1.5 Leonardo  Total Volume for 24 Hours (mL): 600  Continuous  Starting Tube Feed Rate {mL per Hour}: 10  Increase Tube Feed Rate by (mL): 10     Every hour  Until Goal Tube Feed Rate (mL per Hour): 25  Tube Feed Duration (in Hours): 24  Tube Feed Start Time: 02:00  No Carb Prosource (1pkg = 15gms Protein)     Qty per Day:  2  Entered: Apr 1 2021  4:49PM    
This patient has been assessed with a concern for Malnutrition and has been determined to have a diagnosis/diagnoses of Severe protein-calorie malnutrition.    This patient is being managed with:   Diet NPO with Tube Feed-  Tube Feeding Modality: Gastrostomy  Vital AF 1.2 Leonardo  Total Volume for 24 Hours (mL): 1200  Continuous  Starting Tube Feed Rate {mL per Hour}: 30  Increase Tube Feed Rate by (mL): 10     Every 8 hours  Until Goal Tube Feed Rate (mL per Hour): 50  Tube Feed Duration (in Hours): 24  Tube Feed Start Time: 12:45  Entered: Jun 2 2021 12:44PM    The following pending diet order is being considered for treatment of Severe protein-calorie malnutrition:  Diet NPO-  Tube Feeding Modality: Gastrostomy  Vital AF 1.2 Leonardo  Total Volume for 24 Hours (mL): 1200  Continuous  Starting Tube Feed Rate {mL per Hour}: 30  Increase Tube Feed Rate by (mL): 20     Every 6 hours  Until Goal Tube Feed Rate (mL per Hour): 50  Tube Feed Duration (in Hours): 24  Tube Feed Start Time: 12:30  Entered: Jun 2 2021 12:20PM  
This patient has been assessed with a concern for Malnutrition and has been determined to have a diagnosis/diagnoses of Severe protein-calorie malnutrition.    This patient is being managed with:   Diet NPO with Tube Feed-  Tube Feeding Modality: Gastrostomy  Vital AF 1.2 Leonardo  Total Volume for 24 Hours (mL): 1200  Continuous  Starting Tube Feed Rate {mL per Hour}: 30  Increase Tube Feed Rate by (mL): 10     Every 8 hours  Until Goal Tube Feed Rate (mL per Hour): 50  Tube Feed Duration (in Hours): 24  Tube Feed Start Time: 12:45  Entered: Jun 2 2021 12:44PM    The following pending diet order is being considered for treatment of Severe protein-calorie malnutrition:  Diet NPO-  Tube Feeding Modality: Gastrostomy  Vital AF 1.2 Leonardo  Total Volume for 24 Hours (mL): 1200  Continuous  Starting Tube Feed Rate {mL per Hour}: 30  Increase Tube Feed Rate by (mL): 20     Every 6 hours  Until Goal Tube Feed Rate (mL per Hour): 50  Tube Feed Duration (in Hours): 24  Tube Feed Start Time: 12:30  Entered: Jun 2 2021 12:20PM  
This patient has been assessed with a concern for Malnutrition and has been determined to have a diagnosis/diagnoses of Severe protein-calorie malnutrition.    This patient is being managed with:   Diet NPO with Tube Feed-  Tube Feeding Modality: Gastrostomy  Vital AF 1.2 Leonardo  Total Volume for 24 Hours (mL): 1200  Continuous  Starting Tube Feed Rate {mL per Hour}: 30  Increase Tube Feed Rate by (mL): 10     Every 8 hours  Until Goal Tube Feed Rate (mL per Hour): 50  Tube Feed Duration (in Hours): 24  Tube Feed Start Time: 12:45  Entered: Jun 2 2021 12:44PM    The following pending diet order is being considered for treatment of Severe protein-calorie malnutrition:  Diet NPO-  Tube Feeding Modality: Gastrostomy  Vital AF 1.2 Leonardo  Total Volume for 24 Hours (mL): 1200  Continuous  Starting Tube Feed Rate {mL per Hour}: 30  Increase Tube Feed Rate by (mL): 20     Every 6 hours  Until Goal Tube Feed Rate (mL per Hour): 50  Tube Feed Duration (in Hours): 24  Tube Feed Start Time: 12:30  Entered: Jun 2 2021 12:20PM        -Clean all wounds with normal saline and apply skin prep to the surrounding skin  -Clean the Bottom Lip with normal saline and pat dry  -Apply Vaseline or moisturizer b.i.d. PRN  -Provide oral care per protocol  -Apply MediHoney ointment to the L. Hand and cover with a non-adherent dry dressing Q 48hrs PRN  -Continue to elevate/float the patients heels using heel protectors and reposition the patient Q 2hrs using wedges or pillows
This patient has been assessed with a concern for Malnutrition and has been determined to have a diagnosis/diagnoses of Severe protein-calorie malnutrition.    This patient is being managed with:   Diet NPO with Tube Feed-  Tube Feeding Modality: Gastrostomy  Vital AF 1.2 Leonardo  Total Volume for 24 Hours (mL): 1200  Continuous  Starting Tube Feed Rate {mL per Hour}: 30  Increase Tube Feed Rate by (mL): 10     Every 8 hours  Until Goal Tube Feed Rate (mL per Hour): 50  Tube Feed Duration (in Hours): 24  Tube Feed Start Time: 12:45  Entered: Jun 2 2021 12:44PM    The following pending diet order is being considered for treatment of Severe protein-calorie malnutrition:  Diet NPO-  Tube Feeding Modality: Gastrostomy  Vital AF 1.2 Leonardo  Total Volume for 24 Hours (mL): 1200  Continuous  Starting Tube Feed Rate {mL per Hour}: 30  Increase Tube Feed Rate by (mL): 20     Every 6 hours  Until Goal Tube Feed Rate (mL per Hour): 50  Tube Feed Duration (in Hours): 24  Tube Feed Start Time: 12:30  Entered: Jun 2 2021 12:20PM

## 2021-06-11 NOTE — PROGRESS NOTE ADULT - NSICDXPILOT_GEN_ALL_CORE
[place]
Cadwell
Cast[place]
D[place]
Graham
Grays[place]
Hahn[place]
Halma
Hardtner
Hidden[place]
Houghton
H[place]
Kings[place]
L[place]
Merced
Moro
Od[place]
S[place]
S[place]west [place]
Spalding
Strum
T[place]
Warwick
West[place]
Alamog[place]go[place]
Atl[place]
Besse[place]
Chinle
Clovis
Color[place]
Gates
Gold [place]
Hachita
Herndon
Huntingtown
[place]
Kelford
Lodi
Madera
Malcolm
McLemores[place]
Mott
P[place]
Plymouth
[place]
Rich[place]
R[place]
Sedgwick
Shagel[place]
Spok[place]
Tavernier
Vancleave
Vernon
Wellesley [place]
Windfall
Alverton
Auxvasse
Birmingham
Blue Rock
Bradley
Brantingham
Cincinnati
Darby
Factoryville
Hammondsville
Hartland
Hillsdale
Jackson
Jesse
Lampe
Marion
Mekoryuk
Naper
New Haven
Parkersburg
Port Royal
Richgrove
Robinson
Sheakleyville
Syracuse
Ta[place] Valley
Trabuco Canyon
Verona Beach
Vilas
Wernersville
Wolf
Afton
Alabaster
Alba
Annapolis
Ashby
Ashland
Bethel
Bloxom
Buffalo
Cabo Rojo
Calliham
Camden
Carolina
Chaska
Cooks
Cottonwood
Dalton
Dover
Duarte
Escondido
Ethelsville
Everett
Fort Worth
Gans
Gramercy
Grayson
Hartwick
Hinckley
Keenes
Leetsdale
Leigh
Leona
Limerick
Livermore
Luxora
Maiden
Melrose
Newark
Popejoy
Ragan
Red Devil
Redfield
Roche[place]
Roosevelt
Roosevelt
Roy
Santa [place]
Saratoga
Shreveport
Singers [place]
Skippers
Soap Lake
Soper
Spring Valley
Sw[place]
Thompson[place]
Upper Marl[place]
Utica
Waccabuc
Warner
White Oak
Alexandria
Andrews
[place]
Cato
Chanhassen
Cleveland
Clifton
Dyess
Francisco
Frontenac
Fuqu[place] Varina
Greenwood
Jamaica
Kegley
Marblehead
Meadow Lands
Morgan [place]
Morriston
Otterbein
Plymouth
Rathdrum
Roby
Rocky Hill
Savannah
Scenic
Sedley
Silver City
Springfield
Washington
Wellsville
West Palm Beach
White Mountain
Wilmington
Cornelius
Iroquois
Jasper
Paulina
Kelly
Welling
Cayuga
Clayton
Jonesville
Lincoln
Pearcy
Bellevue
Dyke
Fort Worth
Cherry Valley
[place]
Jenks
Queen Creek
Somis
Elmer
Garrattsville
Barnards[place]
Miami
Sutton
Redig
Rockford
Centerville
Fort [place]
Los [place]
Mcarthur
Miller Place
Raisin [place]
Round Mountain
Fernwood
Ona
Union Center
Waikoloa
Lehigh
Panama
Ashfield
Bronx
Loomis
Lake Powell

## 2021-06-11 NOTE — PROGRESS NOTE ADULT - SUBJECTIVE AND OBJECTIVE BOX
ARANZA CHAMBERS    SCU progress note    INTERVAL HPI/OVERNIGHT EVENTS: Pt evaluated at bedside, denies all medical complaints at time of eval, no new events overnight.  Medically cleared for d/c today.    FULL CODE    Covid - 19 PCR: negative since 6/9  The 4Ms    What Matters Most: see GOC  Age appropriate Medications/Screen for High Risk Medication: Yes  Mentation: see CAM below  Mobility: defer to physical exam    The Confusion Assessment Method (CAM) Diagnostic Algorithm     1: Acute Onset or Fluctuating Course  - Is there evidence of an acute change in mental status from the patient’s baseline? Did the (abnormal) behavior  fluctuate during the day, that is, tend to come and go, or increase and decrease in severity?  [ ] YES [ x] NO     2: Inattention  - Did the patient have difficulty focusing attention, being easily distractible, or having difficulty keeping track of what was being said?  [ ] YES [x ] NO     3: Disorganized thinking  -Was the patient’s thinking disorganized or incoherent, such as rambling or irrelevant conversation, unclear or illogical flow of ideas, or unpredictable switching from subject to subject?  [ ] YES [ x] NO    4: Altered Level of consciousness?  [ ] YES [x ] NO    The diagnosis of delirium by CAM requires the presence of features 1 and 2 and either 3 or 4.    PRESSORS: [ ] YES [ ] NO  trimethoprim  40 mG/sulfamethoxazole 200 mG Suspension 160 milliGRAM(s) Oral <User Schedule>    Cardiovascular:  Heart Failure  Acute   Acute on Chronic  Chronic       labetalol 100 milliGRAM(s) Enteral Tube two times a day    Pulmonary:  ALBUTerol    90 MICROgram(s) HFA Inhaler 2 Puff(s) Inhalation every 6 hours PRN    Hematalogic:  enoxaparin Injectable 40 milliGRAM(s) SubCutaneous every 24 hours    Other:  acetaminophen    Suspension .. 650 milliGRAM(s) Enteral Tube every 12 hours PRN  artificial  tears Solution 1 Drop(s) Both EYES every 4 hours PRN  ascorbic acid 1000 milliGRAM(s) Oral daily  BACItracin   Ointment 1 Application(s) Topical two times a day  bisacodyl Suppository 10 milliGRAM(s) Rectal daily PRN  calcium carbonate 1250 mG  + Vitamin D (OsCal 500 + D) 1 Tablet(s) Oral daily  chlorhexidine 2% Cloths 1 Application(s) Topical <User Schedule>  cholecalciferol 1000 Unit(s) Oral daily  clonazePAM  Tablet 0.5 milliGRAM(s) Oral every 12 hours  gabapentin 100 milliGRAM(s) Oral every 8 hours  ondansetron Injectable 4 milliGRAM(s) IV Push every 6 hours PRN  pantoprazole  Injectable 40 milliGRAM(s) IV Push daily  polyethylene glycol 3350 17 Gram(s) Oral daily PRN  predniSONE   Tablet 15 milliGRAM(s) Oral daily  QUEtiapine 50 milliGRAM(s) Oral two times a day  senna 2 Tablet(s) Oral at bedtime  sodium chloride 0.9% lock flush 10 milliLiter(s) IV Push every 1 hour PRN    acetaminophen    Suspension .. 650 milliGRAM(s) Enteral Tube every 12 hours PRN  ALBUTerol    90 MICROgram(s) HFA Inhaler 2 Puff(s) Inhalation every 6 hours PRN  artificial  tears Solution 1 Drop(s) Both EYES every 4 hours PRN  ascorbic acid 1000 milliGRAM(s) Oral daily  BACItracin   Ointment 1 Application(s) Topical two times a day  bisacodyl Suppository 10 milliGRAM(s) Rectal daily PRN  calcium carbonate 1250 mG  + Vitamin D (OsCal 500 + D) 1 Tablet(s) Oral daily  chlorhexidine 2% Cloths 1 Application(s) Topical <User Schedule>  cholecalciferol 1000 Unit(s) Oral daily  clonazePAM  Tablet 0.5 milliGRAM(s) Oral every 12 hours  enoxaparin Injectable 40 milliGRAM(s) SubCutaneous every 24 hours  gabapentin 100 milliGRAM(s) Oral every 8 hours  labetalol 100 milliGRAM(s) Enteral Tube two times a day  ondansetron Injectable 4 milliGRAM(s) IV Push every 6 hours PRN  pantoprazole  Injectable 40 milliGRAM(s) IV Push daily  polyethylene glycol 3350 17 Gram(s) Oral daily PRN  predniSONE   Tablet 15 milliGRAM(s) Oral daily  QUEtiapine 50 milliGRAM(s) Oral two times a day  senna 2 Tablet(s) Oral at bedtime  sodium chloride 0.9% lock flush 10 milliLiter(s) IV Push every 1 hour PRN  trimethoprim  40 mG/sulfamethoxazole 200 mG Suspension 160 milliGRAM(s) Oral <User Schedule>    Drug Dosing Weight  Height (cm): 167.6 (23 Apr 2021 23:15)  Weight (kg): 83.9 (23 Apr 2021 23:15)  BMI (kg/m2): 29.9 (23 Apr 2021 23:15)  BSA (m2): 1.93 (23 Apr 2021 23:15)    CENTRAL LINE: [ ] YES [ ] NO  LOCATION:   DATE INSERTED:  REMOVE: [ ] YES [ ] NO  EXPLAIN:    RIVERA: [ ] YES [ ] NO    DATE INSERTED:  REMOVE:  [ ] YES [ ] NO  EXPLAIN:    PAST MEDICAL & SURGICAL HISTORY:  No pertinent past medical history    S/P moody  1990s    S/P appendectomy  1990s    I&O's Summary      Mode: AC/ CMV (Assist Control/ Continuous Mandatory Ventilation)  RR (machine): 18  TV (machine): 450  FiO2: 40  PEEP: 5  ITime: 0.7  MAP: 8  PIP: 20    PHYSICAL EXAM:    GENERAL: NAD, well-groomed, well-developed  HEAD:  Atraumatic, Normocephalic  EYES: EOMI, PERRLA, conjunctiva and sclera clear  ENMT: Trach site with dressing CDI  NECK: Supple, No JVD, Normal thyroid  NERVOUS SYSTEM:  Alert & Oriented , Good concentration; Motor Strength 5/5 B/L upper and lower extremities; DTRs 2+ intact and symmetric  CHEST/LUNG: Clear to percussion bilaterally; No rales, rhonchi, wheezing, or rubs  HEART: Regular rate and rhythm; No murmurs, rubs, or gallops  ABDOMEN: Soft, Nontender, Nondistended; Bowel sounds present, peg tube site CDI  EXTREMITIES:  2+ Peripheral Pulses, No clubbing, cyanosis, or edema  LYMPH: No lymphadenopathy noted  SKIN: left chest wall chest tube site healing w/ scab formation      LABS:  CBC Full  -  ( 09 Jun 2021 09:13 )  WBC Count : 9.00 K/uL  RBC Count : 3.28 M/uL  Hemoglobin : 10.4 g/dL  Hematocrit : 32.3 %  Platelet Count - Automated : 263 K/uL  Mean Cell Volume : 98.5 fl  Mean Cell Hemoglobin : 31.7 pg  Mean Cell Hemoglobin Concentration : 32.2 gm/dL  Auto Neutrophil # : x  Auto Lymphocyte # : x  Auto Monocyte # : x  Auto Eosinophil # : x  Auto Basophil # : x  Auto Neutrophil % : x  Auto Lymphocyte % : x  Auto Monocyte % : x  Auto Eosinophil % : x  Auto Basophil % : x    06-09    136  |  98  |  23<H>  ----------------------------<  117<H>  3.4<L>   |  30  |  0.73    Ca    9.4      09 Jun 2021 09:13  Phos  3.5     06-09  Mg     2.2     06-09    TPro  7.0  /  Alb  3.0<L>  /  TBili  0.5  /  DBili  x   /  AST  51<H>  /  ALT  139<H>  /  AlkPhos  92  06-09    [x  ]  DVT Prophylaxis  [  ]  Nutrition, Vital at 30ml/hr         Goal Rate 50 ml/hr        Malnutrition      RADIOLOGY & ADDITIONAL STUDIES:  < from: Xray Chest 1 View- PORTABLE-Routine (Xray Chest 1 View- PORTABLE-Routine in AM.) (06.04.21 @ 09:37) >  EXAM:  XR CHEST PORTABLE ROUTINE 1V                            PROCEDURE DATE:  06/04/2021          INTERPRETATION:  Chest one view    HISTORY: Shortness of breath    COMPARISON STUDY: 6/1/2021    Frontal expiratory view of the chest shows the heart to be similar in size. Tracheostomy tube is again noted.    The lungs show slight clearing of patchy left infiltrates and there is no evidence of pneumothorax nor pleural effusion.    IMPRESSION:  Partial clearing, left lung.    Thank you for the courtesy of this referral.            STEFANO SALAS MD; Attending Interventional Radiologist  This document has been electronically signed. Jun 4 2021  3:59PM    < end of copied text >  < from: Xray Chest 1 View- PORTABLE-Urgent (Xray Chest 1 View- PORTABLE-Urgent .) (06.01.21 @ 14:29) >    EXAM:  XR CHEST PORTABLE URGENT 1V                            PROCEDURE DATE:  06/01/2021          INTERPRETATION:  AP erect chest on June 1, 2021 at 2:34 PM. Patient had removal of pigtail left chest tube.    Heart magnified by technique. Tracheostomy remains.    Left pigtail catheter seen earlier in the day has been removed.    Persistent amorphous infiltrate at the site is seen. No pneumothorax.    IMPRESSION: Uneventful removal of left chest tube.            CARMEN FARRELL M.D., ATTENDING RADIOLOGIST  This document has been electronically signed. Jun 1 2021  4:15PM    < end of copied text >      Goals of Care Discussion with Family/Proxy/Other   - see note from/family meeting set up for 4/14      ARANZA CHAMBERS    SCU progress note    INTERVAL HPI/OVERNIGHT EVENTS: Pt evaluated at bedside, denies all medical complaints at time of eval, no new events overnight.  Medically cleared for d/c today.    FULL CODE    Covid - 19 PCR: negative since 6/9  The 4Ms    What Matters Most: see GOC  Age appropriate Medications/Screen for High Risk Medication: Yes  Mentation: see CAM below  Mobility: defer to physical exam    The Confusion Assessment Method (CAM) Diagnostic Algorithm     1: Acute Onset or Fluctuating Course  - Is there evidence of an acute change in mental status from the patient’s baseline? Did the (abnormal) behavior  fluctuate during the day, that is, tend to come and go, or increase and decrease in severity?  [ ] YES [ x] NO     2: Inattention  - Did the patient have difficulty focusing attention, being easily distractible, or having difficulty keeping track of what was being said?  [ ] YES [x ] NO     3: Disorganized thinking  -Was the patient’s thinking disorganized or incoherent, such as rambling or irrelevant conversation, unclear or illogical flow of ideas, or unpredictable switching from subject to subject?  [ ] YES [ x] NO    4: Altered Level of consciousness?  [ ] YES [x ] NO    The diagnosis of delirium by CAM requires the presence of features 1 and 2 and either 3 or 4.    PRESSORS: [ ] YES [ ] NO  trimethoprim  40 mG/sulfamethoxazole 200 mG Suspension 160 milliGRAM(s) Oral <User Schedule>    Cardiovascular:  Heart Failure  Acute   Acute on Chronic  Chronic       labetalol 100 milliGRAM(s) Enteral Tube two times a day    Pulmonary:  ALBUTerol    90 MICROgram(s) HFA Inhaler 2 Puff(s) Inhalation every 6 hours PRN    Hematalogic:  enoxaparin Injectable 40 milliGRAM(s) SubCutaneous every 24 hours    Other:  acetaminophen    Suspension .. 650 milliGRAM(s) Enteral Tube every 12 hours PRN  artificial  tears Solution 1 Drop(s) Both EYES every 4 hours PRN  ascorbic acid 1000 milliGRAM(s) Oral daily  BACItracin   Ointment 1 Application(s) Topical two times a day  bisacodyl Suppository 10 milliGRAM(s) Rectal daily PRN  calcium carbonate 1250 mG  + Vitamin D (OsCal 500 + D) 1 Tablet(s) Oral daily  chlorhexidine 2% Cloths 1 Application(s) Topical <User Schedule>  cholecalciferol 1000 Unit(s) Oral daily  clonazePAM  Tablet 0.5 milliGRAM(s) Oral every 12 hours  gabapentin 100 milliGRAM(s) Oral every 8 hours  ondansetron Injectable 4 milliGRAM(s) IV Push every 6 hours PRN  pantoprazole  Injectable 40 milliGRAM(s) IV Push daily  polyethylene glycol 3350 17 Gram(s) Oral daily PRN  predniSONE   Tablet 15 milliGRAM(s) Oral daily  QUEtiapine 50 milliGRAM(s) Oral two times a day  senna 2 Tablet(s) Oral at bedtime  sodium chloride 0.9% lock flush 10 milliLiter(s) IV Push every 1 hour PRN    acetaminophen    Suspension .. 650 milliGRAM(s) Enteral Tube every 12 hours PRN  ALBUTerol    90 MICROgram(s) HFA Inhaler 2 Puff(s) Inhalation every 6 hours PRN  artificial  tears Solution 1 Drop(s) Both EYES every 4 hours PRN  ascorbic acid 1000 milliGRAM(s) Oral daily  BACItracin   Ointment 1 Application(s) Topical two times a day  bisacodyl Suppository 10 milliGRAM(s) Rectal daily PRN  calcium carbonate 1250 mG  + Vitamin D (OsCal 500 + D) 1 Tablet(s) Oral daily  chlorhexidine 2% Cloths 1 Application(s) Topical <User Schedule>  cholecalciferol 1000 Unit(s) Oral daily  clonazePAM  Tablet 0.5 milliGRAM(s) Oral every 12 hours  enoxaparin Injectable 40 milliGRAM(s) SubCutaneous every 24 hours  gabapentin 100 milliGRAM(s) Oral every 8 hours  labetalol 100 milliGRAM(s) Enteral Tube two times a day  ondansetron Injectable 4 milliGRAM(s) IV Push every 6 hours PRN  pantoprazole  Injectable 40 milliGRAM(s) IV Push daily  polyethylene glycol 3350 17 Gram(s) Oral daily PRN  predniSONE   Tablet 15 milliGRAM(s) Oral daily  QUEtiapine 50 milliGRAM(s) Oral two times a day  senna 2 Tablet(s) Oral at bedtime  sodium chloride 0.9% lock flush 10 milliLiter(s) IV Push every 1 hour PRN  trimethoprim  40 mG/sulfamethoxazole 200 mG Suspension 160 milliGRAM(s) Oral <User Schedule>    Drug Dosing Weight  Height (cm): 167.6 (23 Apr 2021 23:15)  Weight (kg): 83.9 (23 Apr 2021 23:15)  BMI (kg/m2): 29.9 (23 Apr 2021 23:15)  BSA (m2): 1.93 (23 Apr 2021 23:15)    CENTRAL LINE: [ ] YES [ ] NO  LOCATION:   DATE INSERTED:  REMOVE: [ ] YES [ ] NO  EXPLAIN:    RIVERA: [ ] YES [ ] NO    DATE INSERTED:  REMOVE:  [ ] YES [ ] NO  EXPLAIN:    PAST MEDICAL & SURGICAL HISTORY:  No pertinent past medical history    S/P moody  1990s    S/P appendectomy  1990s    I&O's Summary      Mode: AC/ CMV (Assist Control/ Continuous Mandatory Ventilation)  RR (machine): 18  TV (machine): 450  FiO2: 40  PEEP: 5  ITime: 0.7  MAP: 8  PIP: 20    PHYSICAL EXAM:    GENERAL: NAD, well-groomed, well-developed  HEAD:  Atraumatic, Normocephalic  EYES: EOMI, PERRLA, conjunctiva and sclera clear  ENMT: Trach site with dressing CDI  NECK: Supple, No JVD, Normal thyroid  NERVOUS SYSTEM:  Alert & Oriented , Good concentration; Motor Strength 5/5 B/L upper and lower extremities; DTRs 2+ intact and symmetric  CHEST/LUNG: Clear to percussion bilaterally; No rales, rhonchi, wheezing, or rubs  HEART: Regular rate and rhythm; No murmurs, rubs, or gallops  ABDOMEN: Soft, Nontender, Nondistended; Bowel sounds present, peg tube site CDI, old peg site w/ wound healing, approximately 1/4 cm upper part of wound w/ pink healthy granulating tissue, no surrounding redness or drainage.   EXTREMITIES:  2+ Peripheral Pulses, No clubbing, cyanosis, or edema  LYMPH: No lymphadenopathy noted  SKIN: left chest wall chest tube site healing w/ scab formation      LABS:  CBC Full  -  ( 09 Jun 2021 09:13 )  WBC Count : 9.00 K/uL  RBC Count : 3.28 M/uL  Hemoglobin : 10.4 g/dL  Hematocrit : 32.3 %  Platelet Count - Automated : 263 K/uL  Mean Cell Volume : 98.5 fl  Mean Cell Hemoglobin : 31.7 pg  Mean Cell Hemoglobin Concentration : 32.2 gm/dL  Auto Neutrophil # : x  Auto Lymphocyte # : x  Auto Monocyte # : x  Auto Eosinophil # : x  Auto Basophil # : x  Auto Neutrophil % : x  Auto Lymphocyte % : x  Auto Monocyte % : x  Auto Eosinophil % : x  Auto Basophil % : x    06-09    136  |  98  |  23<H>  ----------------------------<  117<H>  3.4<L>   |  30  |  0.73    Ca    9.4      09 Jun 2021 09:13  Phos  3.5     06-09  Mg     2.2     06-09    TPro  7.0  /  Alb  3.0<L>  /  TBili  0.5  /  DBili  x   /  AST  51<H>  /  ALT  139<H>  /  AlkPhos  92  06-09    [x  ]  DVT Prophylaxis  [  ]  Nutrition, Vital at 30ml/hr         Goal Rate 50 ml/hr        Malnutrition      RADIOLOGY & ADDITIONAL STUDIES:  < from: Xray Chest 1 View- PORTABLE-Routine (Xray Chest 1 View- PORTABLE-Routine in AM.) (06.04.21 @ 09:37) >  EXAM:  XR CHEST PORTABLE ROUTINE 1V                            PROCEDURE DATE:  06/04/2021          INTERPRETATION:  Chest one view    HISTORY: Shortness of breath    COMPARISON STUDY: 6/1/2021    Frontal expiratory view of the chest shows the heart to be similar in size. Tracheostomy tube is again noted.    The lungs show slight clearing of patchy left infiltrates and there is no evidence of pneumothorax nor pleural effusion.    IMPRESSION:  Partial clearing, left lung.    Thank you for the courtesy of this referral.            STEFANO SALAS MD; Attending Interventional Radiologist  This document has been electronically signed. Jun 4 2021  3:59PM    < end of copied text >  < from: Xray Chest 1 View- PORTABLE-Urgent (Xray Chest 1 View- PORTABLE-Urgent .) (06.01.21 @ 14:29) >    EXAM:  XR CHEST PORTABLE URGENT 1V                            PROCEDURE DATE:  06/01/2021          INTERPRETATION:  AP erect chest on June 1, 2021 at 2:34 PM. Patient had removal of pigtail left chest tube.    Heart magnified by technique. Tracheostomy remains.    Left pigtail catheter seen earlier in the day has been removed.    Persistent amorphous infiltrate at the site is seen. No pneumothorax.    IMPRESSION: Uneventful removal of left chest tube.            CARMEN FARRELL M.D., ATTENDING RADIOLOGIST  This document has been electronically signed. Jun 1 2021  4:15PM    < end of copied text >      Goals of Care Discussion with Family/Proxy/Other   - see note from/family meeting set up for 4/14

## 2021-06-11 NOTE — CHART NOTE - NSCHARTNOTEFT_GEN_A_CORE
Case discussed with pt's wife and his brother Alec Lau prior to his discharge and obtained verbal consent for [employer] COVID 19 vaccine.    Plan of care discussed and all questions answered.

## 2021-06-11 NOTE — PROGRESS NOTE ADULT - REASON FOR ADMISSION
SOB
Dyspnea
SOB

## 2021-06-11 NOTE — PROGRESS NOTE ADULT - SUBJECTIVE AND OBJECTIVE BOX
Patient is a 55y old  Male who presents with a chief complaint of SOB (10 Nestor 2021 11:54)    pt seen in icu [  ], reg med floor scu [ x  ], bed [ x ], chair at bedside [   ], awake and responsive [ x ], mildly sedated, [  ],    nad [x  ]        Allergies    No Known Allergies        Vitals    T(F): 98.4 (06-11-21 @ 05:00), Max: 98.4 (06-11-21 @ 05:00)  HR: 85 (06-11-21 @ 05:00) (80 - 92)  BP: 161/90 (06-11-21 @ 05:00) (136/82 - 161/90)  RR: 18 (06-11-21 @ 05:00) (16 - 18)  SpO2: 100% (06-11-21 @ 05:00) (99% - 100%)  Wt(kg): --  CAPILLARY BLOOD GLUCOSE      POCT Blood Glucose.: 125 mg/dL (11 Jun 2021 05:41)      Labs                          10.4   9.00  )-----------( 263      ( 09 Jun 2021 09:13 )             32.3       06-09    136  |  98  |  23<H>  ----------------------------<  117<H>  3.4<L>   |  30  |  0.73    Ca    9.4      09 Jun 2021 09:13  Phos  3.5     06-09  Mg     2.2     06-09    TPro  7.0  /  Alb  3.0<L>  /  TBili  0.5  /  DBili  x   /  AST  51<H>  /  ALT  139<H>  /  AlkPhos  92  06-09            .Sputum Sputum  04-16 @ 04:42   Moderate Enterobacter aerogenes (Carbapenem Resistant)  Normal Respiratory Monserrat present  --  Enterobacter aerogenes (Carbapenem Resistant)      .Urine Clean Catch (Midstream)  03-15 @ 00:52   No growth  --  --          Radiology Results      Meds    MEDICATIONS  (STANDING):  ascorbic acid 1000 milliGRAM(s) Oral daily  BACItracin   Ointment 1 Application(s) Topical two times a day  calcium carbonate 1250 mG  + Vitamin D (OsCal 500 + D) 1 Tablet(s) Oral daily  chlorhexidine 2% Cloths 1 Application(s) Topical <User Schedule>  cholecalciferol 1000 Unit(s) Oral daily  clonazePAM  Tablet 0.5 milliGRAM(s) Oral every 12 hours  enoxaparin Injectable 40 milliGRAM(s) SubCutaneous every 24 hours  gabapentin 100 milliGRAM(s) Oral every 8 hours  labetalol 100 milliGRAM(s) Enteral Tube two times a day  pantoprazole  Injectable 40 milliGRAM(s) IV Push daily  QUEtiapine 50 milliGRAM(s) Oral two times a day  senna 2 Tablet(s) Oral at bedtime  trimethoprim  40 mG/sulfamethoxazole 200 mG Suspension 160 milliGRAM(s) Oral <User Schedule>      MEDICATIONS  (PRN):  acetaminophen    Suspension .. 650 milliGRAM(s) Enteral Tube every 12 hours PRN Temp greater or equal to 38C (100.4F), Mild Pain (1 - 3)  ALBUTerol    90 MICROgram(s) HFA Inhaler 2 Puff(s) Inhalation every 6 hours PRN Shortness of Breath and/or Wheezing  artificial  tears Solution 1 Drop(s) Both EYES every 4 hours PRN Dry Eyes  bisacodyl Suppository 10 milliGRAM(s) Rectal daily PRN Constipation  ondansetron Injectable 4 milliGRAM(s) IV Push every 6 hours PRN Nausea and/or Vomiting  polyethylene glycol 3350 17 Gram(s) Oral daily PRN Constipation  sodium chloride 0.9% lock flush 10 milliLiter(s) IV Push every 1 hour PRN Pre/post blood products, medications, blood draw, and to maintain line patency      Physical Exam    Neuro :  no focal deficits  Respiratory: CTA B/L  CV: RRR, S1S2, no murmurs,   Abdominal: Soft, NT, ND +BS, gastrostomy tube inplace  Extremities: edema of extrem, + peripheral pulses      ASSESSMENT      Hypoxemia 2nd to covid pna   transaminitis  prediabetes  h/o appendectomy  cholecystectomy        PLAN    cont cont precautions  covid 5/14/21 neg noted above  d/c remdesevir given covid ab positive noted   completed dexamethasone   started pulse steroids for 3 days - 250mg solumedrol bid now tapered off 4/14/21   prednisone 20 daily d/c 6/1/21   cont on bactrim empirically, TIW   cont asa, vit c,    cont albuterol inhaler   pulm f/u  procalcitonin, D-dimer, crp, ldh, ferritin, lactate noted ,    cont tylenol prn,   cont robitussin prn   pt off precedex    pt on methadone   pain mgmt eval noted   off phenylephrine drip for hypotension  clonidine held 2nd to low bp  s/p intubation 3/29/21   s/p tracheostomy 4/21/21  O2 sat 100% (98% - 100%) mv 40%  O2 via mech vent   cont wean as tolerated   pt tolerated 9hrs of only pressure support 6/4/21  vent mgmt as per scu  thoracic surg f/u   s/p L chest tube replacement 4/18/21 for recurrence of left pneumothorax   cxr 4/20/21 with Unchanged advanced infiltrates and catheter left chest tube noted   CXR 4/11/21 with : No interval change compared to one day prior. No pneumothorax noted.   unable to flush or aspirate tube fully, noted debris in tubing which was milked out.   Once material removed from tubing able to aspirated and flush fully. Also changed dressing on pigtail which appears to be in good position.   Monitor O2 status   s/p tracheostomy 4/21/21   cxr 4/21/21 with No evidence of active chest disease. Tracheostomy tube in place otherwise no significant change noted   cxr 4/25/21 with A 40% LEFT pneumothorax. LEFT multi-sidehole pigtail catheter overlies LEFT lower hemithorax.. Bilateral multifocal and diffuse ill-defined airspace opacities..  Follow-up AP portable chest radiograph 4/25/2021 AT 8:58 AM: Residual LEFT 30% upper zone pneumothorax. Otherwise no interval change.noted    s/p 2nd chest tube 5/5/21  cxr 5/6/21 with Tracheostomy and catheter left chest tubes remain. , There are significant diffuse advanced infiltrates again noted. No pneumothorax. Above findings are similar to study earlier in the day. Present film shows a left jugular line inserted   with tip entering the superior vena cava noted   cxr 5/30/21 with Tracheostomy cannula left chest catheter reidentified in position. No change small simple left apical pneumothorax. No change bilateral airspace opacities. Trace left pleural effusion suggested noted   cxr 5/31/21 with Status post tracheostomy. Status post left chest pigtail catheter. Trace left pneumothorax is unchanged. Cardiomegaly. Moderate to severe multifocal patchy opacificationof both lungs, left greater than right, is unchanged. Nonobstructive bowel gas pattern. No dilated loops of small or large bowel noted   s/p removal of L pigtail.   CXR 6/1/21 post l pig tail removal with Left pigtail catheter seen earlier in the day has been removed. Persistent amorphous infiltrate at the site is seen. No pneumothorax noted above.  No acute thoracic surgical intervention at this time,  s/p surgical placement of gastrostomy tube 4/23/2021.   abd xray 5/10/21 with ileus noted   dressing changed daily for seroma   abd xray 5/21/21 with Decreased bowel ileus compared to prior 5/20/2021 exam noted above.   abd xray 5/22/21 with No dilated loops of bowel noted above.   cont tube feeding   CT scan of the chest 5/13/21 demonstrates diffuse bilateral infiltrates with a region of consolidation at the left lung base. Small left pneumothorax. 2 left chest tubes noted in place noted above.  CT scan of the abdomen and pelvis 5/13/21 demonstrates diffuse dilatation of small and large bowel loops most consistent with ileus noted   xray abd with  5/14/21 with Ingested contrast within nonobstructed large bowel to the level of rectum. No acute radiographic intra-abdominal findings noted   ct abd-pelv 5/27/21 with Delayed nephrographic phase enhancement of the nonobstructed bilateral kidneys, suggestive of acute renal insufficiency. No evidence of bowel obstruction. Proctitis noted above.   id f/u   patient completed course of Meropenem  leukocytosis resolved  sputum cx with Enterobacter aerogenes (Carbapenem Resistant) noted above   afebrile  Completed  meropenem, s/p 1 dose of Vancomycin   completed zosyn  lispro ss   s/p 4 units prbc for anemia  f/u h/h    transfuse prbc as needed  heme onc f/u  Haptoglobin normal  no hemolysis, Fe/B12/folate adequate  hemolysis is unlikely even his baseline haptoglobin may be very elevated due to COVID  direct pamella neg noted    psych f/u/  C/w Klonopin at reduced dose of 0.5 mg q12h standing (no PRNs),   cont Seroquel at same dose (50 mg BID)  Continue delirium precautions: Frequent reorientation, familiar pictures and objects at bedside, natural light in daytime, consistent sleep/wake schedule, adequate hydration and nutrition, sensory aids (hearing aids, glasses) present if needed, minimizing noise and overstimulation, clustering care to minimize overnight interruptions, judicious use of deliriogenic medications (anticholinergics, benzodiazepines and opioid analgesics), minimize use of restraints  --Highly recommend pt be provided with whiteboard and marker to facilitate communication with providers  Pt is psych cleared for dispo as soon as he is medically optimized  cont current meds   [facility]  mgmt as per scu             Patient is a 55y old  Male who presents with a chief complaint of SOB (10 Nestor 2021 11:54)    pt seen in icu [  ], reg med floor scu [ x  ], bed [ x ], chair at bedside [   ], awake and responsive [ x ], mildly sedated, [  ],    nad [x  ]        Allergies    No Known Allergies        Vitals    T(F): 98.4 (06-11-21 @ 05:00), Max: 98.4 (06-11-21 @ 05:00)  HR: 85 (06-11-21 @ 05:00) (80 - 92)  BP: 161/90 (06-11-21 @ 05:00) (136/82 - 161/90)  RR: 18 (06-11-21 @ 05:00) (16 - 18)  SpO2: 100% (06-11-21 @ 05:00) (99% - 100%)  Wt(kg): --  CAPILLARY BLOOD GLUCOSE      POCT Blood Glucose.: 125 mg/dL (11 Jun 2021 05:41)      Labs                          10.4   9.00  )-----------( 263      ( 09 Jun 2021 09:13 )             32.3       06-09    136  |  98  |  23<H>  ----------------------------<  117<H>  3.4<L>   |  30  |  0.73    Ca    9.4      09 Jun 2021 09:13  Phos  3.5     06-09  Mg     2.2     06-09    TPro  7.0  /  Alb  3.0<L>  /  TBili  0.5  /  DBili  x   /  AST  51<H>  /  ALT  139<H>  /  AlkPhos  92  06-09            .Sputum Sputum  04-16 @ 04:42   Moderate Enterobacter aerogenes (Carbapenem Resistant)  Normal Respiratory Monserrat present  --  Enterobacter aerogenes (Carbapenem Resistant)      .Urine Clean Catch (Midstream)  03-15 @ 00:52   No growth  --  --          Radiology Results      Meds    MEDICATIONS  (STANDING):  ascorbic acid 1000 milliGRAM(s) Oral daily  BACItracin   Ointment 1 Application(s) Topical two times a day  calcium carbonate 1250 mG  + Vitamin D (OsCal 500 + D) 1 Tablet(s) Oral daily  chlorhexidine 2% Cloths 1 Application(s) Topical <User Schedule>  cholecalciferol 1000 Unit(s) Oral daily  clonazePAM  Tablet 0.5 milliGRAM(s) Oral every 12 hours  enoxaparin Injectable 40 milliGRAM(s) SubCutaneous every 24 hours  gabapentin 100 milliGRAM(s) Oral every 8 hours  labetalol 100 milliGRAM(s) Enteral Tube two times a day  pantoprazole  Injectable 40 milliGRAM(s) IV Push daily  QUEtiapine 50 milliGRAM(s) Oral two times a day  senna 2 Tablet(s) Oral at bedtime  trimethoprim  40 mG/sulfamethoxazole 200 mG Suspension 160 milliGRAM(s) Oral <User Schedule>      MEDICATIONS  (PRN):  acetaminophen    Suspension .. 650 milliGRAM(s) Enteral Tube every 12 hours PRN Temp greater or equal to 38C (100.4F), Mild Pain (1 - 3)  ALBUTerol    90 MICROgram(s) HFA Inhaler 2 Puff(s) Inhalation every 6 hours PRN Shortness of Breath and/or Wheezing  artificial  tears Solution 1 Drop(s) Both EYES every 4 hours PRN Dry Eyes  bisacodyl Suppository 10 milliGRAM(s) Rectal daily PRN Constipation  ondansetron Injectable 4 milliGRAM(s) IV Push every 6 hours PRN Nausea and/or Vomiting  polyethylene glycol 3350 17 Gram(s) Oral daily PRN Constipation  sodium chloride 0.9% lock flush 10 milliLiter(s) IV Push every 1 hour PRN Pre/post blood products, medications, blood draw, and to maintain line patency      Physical Exam    Neuro :  no focal deficits  Respiratory: CTA B/L  CV: RRR, S1S2, no murmurs,   Abdominal: Soft, NT, ND +BS, gastrostomy tube inplace  Extremities: edema of extrem, + peripheral pulses      ASSESSMENT      Hypoxemia 2nd to covid pna   transaminitis  prediabetes  h/o appendectomy  cholecystectomy        PLAN    cont cont precautions  covid 5/14/21 neg noted above  d/c remdesevir given covid ab positive noted   completed dexamethasone   started pulse steroids for 3 days - 250mg solumedrol bid now tapered off 4/14/21   prednisone 20 daily d/c 6/1/21   cont on bactrim empirically, TIW   cont asa, vit c,    cont albuterol inhaler   pulm f/u  procalcitonin, D-dimer, crp, ldh, ferritin, lactate noted ,    cont tylenol prn,   cont robitussin prn   pt off precedex    pt on methadone   pain mgmt eval noted   off phenylephrine drip for hypotension  clonidine held 2nd to low bp  s/p intubation 3/29/21   s/p tracheostomy 4/21/21  O2 sat 100% (99% - 100%) mv 40%  O2 via mech vent   cont wean as tolerated   pt tolerated 9hrs of only pressure support 6/4/21  vent mgmt as per scu  thoracic surg f/u   s/p L chest tube replacement 4/18/21 for recurrence of left pneumothorax   cxr 4/20/21 with Unchanged advanced infiltrates and catheter left chest tube noted   CXR 4/11/21 with : No interval change compared to one day prior. No pneumothorax noted.   unable to flush or aspirate tube fully, noted debris in tubing which was milked out.   Once material removed from tubing able to aspirated and flush fully. Also changed dressing on pigtail which appears to be in good position.   Monitor O2 status   s/p tracheostomy 4/21/21   cxr 4/21/21 with No evidence of active chest disease. Tracheostomy tube in place otherwise no significant change noted   cxr 4/25/21 with A 40% LEFT pneumothorax. LEFT multi-sidehole pigtail catheter overlies LEFT lower hemithorax.. Bilateral multifocal and diffuse ill-defined airspace opacities..  Follow-up AP portable chest radiograph 4/25/2021 AT 8:58 AM: Residual LEFT 30% upper zone pneumothorax. Otherwise no interval change.noted    s/p 2nd chest tube 5/5/21  cxr 5/6/21 with Tracheostomy and catheter left chest tubes remain. , There are significant diffuse advanced infiltrates again noted. No pneumothorax. Above findings are similar to study earlier in the day. Present film shows a left jugular line inserted   with tip entering the superior vena cava noted   cxr 5/30/21 with Tracheostomy cannula left chest catheter reidentified in position. No change small simple left apical pneumothorax. No change bilateral airspace opacities. Trace left pleural effusion suggested noted   cxr 5/31/21 with Status post tracheostomy. Status post left chest pigtail catheter. Trace left pneumothorax is unchanged. Cardiomegaly. Moderate to severe multifocal patchy opacificationof both lungs, left greater than right, is unchanged. Nonobstructive bowel gas pattern. No dilated loops of small or large bowel noted   s/p removal of L pigtail.   CXR 6/1/21 post l pig tail removal with Left pigtail catheter seen earlier in the day has been removed. Persistent amorphous infiltrate at the site is seen. No pneumothorax noted above.  No acute thoracic surgical intervention at this time,  s/p surgical placement of gastrostomy tube 4/23/2021.   abd xray 5/10/21 with ileus noted   dressing changed daily for seroma   abd xray 5/21/21 with Decreased bowel ileus compared to prior 5/20/2021 exam noted above.   abd xray 5/22/21 with No dilated loops of bowel noted above.   cont tube feeding   CT scan of the chest 5/13/21 demonstrates diffuse bilateral infiltrates with a region of consolidation at the left lung base. Small left pneumothorax. 2 left chest tubes noted in place noted above.  CT scan of the abdomen and pelvis 5/13/21 demonstrates diffuse dilatation of small and large bowel loops most consistent with ileus noted   xray abd with  5/14/21 with Ingested contrast within nonobstructed large bowel to the level of rectum. No acute radiographic intra-abdominal findings noted   ct abd-pelv 5/27/21 with Delayed nephrographic phase enhancement of the nonobstructed bilateral kidneys, suggestive of acute renal insufficiency. No evidence of bowel obstruction. Proctitis noted above.   id f/u   patient completed course of Meropenem  leukocytosis resolved  sputum cx with Enterobacter aerogenes (Carbapenem Resistant) noted above   afebrile  Completed  meropenem, s/p 1 dose of Vancomycin   completed zosyn  lispro ss   s/p 4 units prbc for anemia  f/u h/h    transfuse prbc as needed  heme onc f/u  Haptoglobin normal  no hemolysis, Fe/B12/folate adequate  hemolysis is unlikely even his baseline haptoglobin may be very elevated due to COVID  direct pamella neg noted    psych f/u/  C/w Klonopin at reduced dose of 0.5 mg q12h standing (no PRNs),   cont Seroquel at same dose (50 mg BID)  Continue delirium precautions: Frequent reorientation, familiar pictures and objects at bedside, natural light in daytime, consistent sleep/wake schedule, adequate hydration and nutrition, sensory aids (hearing aids, glasses) present if needed, minimizing noise and overstimulation, clustering care to minimize overnight interruptions, judicious use of deliriogenic medications (anticholinergics, benzodiazepines and opioid analgesics), minimize use of restraints  --Highly recommend pt be provided with whiteboard and marker to facilitate communication with providers  Pt is psych cleared for dispo as soon as he is medically optimized  cont current meds   [facility]  mgmt as per scu

## 2021-06-11 NOTE — PROGRESS NOTE ADULT - NSTELEHEALTH_GEN_ALL_CORE
No
Yes
No

## 2021-06-11 NOTE — PROGRESS NOTE ADULT - ATTENDING COMMENTS
Problem/Plan - 1:  ·  Problem: Acute hypoxemic respiratory failure due to COVID-19.  Plan: S/p Dexamethasone , No Remdesivir  due to positive antibodies   CXR results as above  Continue Bactrim for empiric coverage   Continue with prednisone taper  Continue inhalers  Continue ventilatory support.   continue SBT with PS during the day as tolerated. Tolerated three hours today with PS15  Full vent support overnight  Monitor oxygen saturation.      Problem/Plan - 2:  ·  Problem: Acute respiratory distress syndrome (ARDS) due to COVID-19 virus.  Plan: Slowly improving.  CXR improved.  Continue mechanical ventilation SBT as tolerated.  Serial CXRs  Monitor oxygen saturation.     Disp: for transfer to [facility]

## 2021-06-11 NOTE — PROGRESS NOTE ADULT - PROBLEM SELECTOR PLAN 5
S/P 4 PRBC total    Hg stable > 8  CTH and CT abdomen w/o contrast no evidence of bleed    No active signs of bleeding (No hematemesis, melena or hematuria)  Hemolysis w/u- negative  Iron studies show ACD  Monitor CBC .   Transfuse Hgb <8  Dr Andrade on board.
S/p vomiting  XR noted.   zofran prn  surgical follow up.
S/p vomiting  hold GT feeding  zofram prn  surgical follow up.
Improved.   Abd xray results as above  Continue PEG tube feedings  Continue with bowel regimen  Continue Naloxegol  Continue with Zofran prn  Serial abdominal exam  Monitor PEG residual.
S/p vomiting  XR noted.   zofran prn  surgical follow up.
Pt admitted from home  PT to be consulted when oxygen demands have decreased
S/p vomiting  XR noted.   zofran prn  surgical follow up.
S/p vomiting  hold GT feeding  zofram prn  surgical follow up.
c/o dysuria   UA neg  UCx neg  Started pyridium x 3 days as PPX   resolved dysuria
S/p vomiting  XR noted.   zofran prn  surgical follow up.
Improved.   Abd xray results as above  Continue PEG tube feedings  Continue with bowel regimen  Continue Naloxegol  Continue with Zofran prn  Serial abdominal exam  Monitor PEG residual.
S/p vomiting  XR noted.   zofran prn  surgical follow up.
S/p vomiting  XR noted.   zofran prn  surgical follow up.
c/o dysuria   UA neg  UCx neg  Started pyridium x 3 days as PPX   resolved dysuria
S/p vomiting  XR noted.   zofran prn  surgical follow up.
likely secondary to Covid19   Resolved  hepatitis panel negative  - continue to monitor LFT
2/2 acute illness. Albumin 1.3. Continues TF Via NGT. High risk of skin breakdown.
S/p vomiting  XR noted.   zofran prn  surgical follow up.
S/p vomiting  XR noted.   zofran prn  surgical follow up.
likely secondary to Covid19   Resolved  hepatitis panel negative  - continue to monitor LFT
S/p vomiting  hold GT feeding  zofram prn  surgical follow up
Surgicel removed.   Thoracic sx follow up  Daily  CXR   Management per ICU
2/2 acute illness. Albumin 1.3. Continues TF Via NGT. High risk of skin breakdown.
2/2 acute illness. Albumin 1.9. Continues TF Via NGT. High risk of skin breakdown.
Improved.   Abd xray results as above  Continue PEG tube feedings  Continue with bowel regimen  Continue Naloxegol  Continue with Zofran prn  Serial abdominal exam  Monitor PEG residual.
Delirium due to COVID  infection, PNA,  metabolic derangement, benzo withdrawal, prolonged hospital stay  Improving  Continue Klonopin 0.5mg  ---->decreased to q12h.   Continue Seroquel 50mg BID,   Methadone  Supportive measures  Psych following.
2/2 acute illness. Patient sedated/intubated 2/2 covid-19 PNA. Dependent on ADLs. Guarded prognosis.
Improved.   Abd xray results as above  Continue PEG tube feedings  Continue with bowel regimen  Continue Naloxegol  Continue with Zofran prn  Serial abdominal exam  Monitor PEG residual.
2/2 acute illness. Albumin 2.2. Continues TF Via NGT. High risk of skin breakdown. Plan for open gastrostomy tube 4/23/2021.
S/P 4 PRBC total    Hg stable > 8  CTH and CT abdomen w/o contrast no evidence of bleed    No active signs of bleeding (No hematemesis, melena or hematuria)  Hemolysis w/u- negative  Iron studies show ACD  Monitor CBC .   Transfuse Hgb <8  Dr Andrade on board.
Improved.   Abd xray results as above  Continue PEG tube feedings  Continue with bowel regimen  Continue Naloxegol  Continue with Zofran prn  Serial abdominal exam  Monitor PEG residual.
likely secondary to Covid19   Resolved  hepatitis panel negative  - continue to monitor LFT

## 2021-06-11 NOTE — PROGRESS NOTE ADULT - ASSESSMENT
55M, no PMH, PSH appy and moody 1990s presented 3/14 with x9 days worsening cough, subjective fevers, and SOB, with x2-3 days dysuria and central, non-radiating, constant CP, admitted to [facility] for management of COVID PNA. Given discomfort of breathing, placed on 4L NC, with improvement. Labs notable for lymphopenia and neutrophilia despite WBC wnl, d-dimer 423, AST//114, lactate 3.3, and . Trop negative x1. +COVID. CXR notable for b/l infiltrates, L>R. Given x1 dose Tylenol and 1L IVF bolus in ED. Initially started on Remdesivir and Dexamethasone, Remdesivir was later discontinued secondary to positive antibodies and elevated LFT's. Not a  candidate for Tociluzimab due to elevated LFT. Patient continued to have respiratory distress requiring 15 L NRB support.    3/19/2021- Patient got transferred to ICU  due to increased work of breathing despite being on 15 L NRB . Patient was on HFNC to keep saturation >90%. Chest X ray showed Grossly stable mild left apical pneumothorax. Stable small pneumomediastinum. Soft tissue emphysema at the neck bases again noted. Stable patchy bilateral pulmonary infiltrates. Thoracic surgery consulted, no intervention at this time.  3/22/2021 - Chest X ray revealed trace ptx right and mild left pneumothorax  3/24/2021 - Overnight pt saturation remains in early 90s and late 80s . Pt remains on HFNC. CXR stable  3/25/2021- Chest X ray showed Overlying chin obscures lung apices. Questionable tiny left apical pneumothorax. Trial of semi- pulse steroid .. solumedrol 250 bid x 3 days   3/28- Started on solumedrol 40 mg bid  3/29/2021 - At 5.30 am, pt was in severe respiratory distress with tachypneic in 40s, tachycardic in 140s, saturating at high 70s, so decision was made to sedate and intubate the pt. Anesthesiologist intubated the patient. A central line, NGT  was placed  4/1/2021- Palliative care consulted. patient's brother/Idalgo ([phone]) has agreed to act as surrogate. Prednisone tapered to  once daily   4/6/2021- Left TLC placed, Hypertensive - s/p Lopressor 5 mg X 1  4/8/2021- Lasix daily and HCTz 50 mg added to aim for negative fluid balance, Left Chest tube placed, added Albumin (4/8-4/10)  4/11/2021-patient was having fever 100.8 rectal, s/p Tylenol , s/p 1 dose of vancomycin, on zosyn - New infiltrate on CXR ,likely developing VAP  4/13/2021- Continue to spike fevers, Zosyn switched to Meropenem. ID- Dr Anand following, new TLC placed  4/15/2021- Chest tuber discontinued   4/18/2021- BP running on lower side, Started on phenylephrine, Ibuprofen D/Connor , FiO2 increased to 70%Patient had large pneumothorax note don Left, thoracic surgery was consulted ,  left chest tube placed.  4/20/2021- Patient received 1 dose of Epifanio for vent asynchrony, A line placed, vent settings changed fro mPC to CVVC     4/21/2021- Trach placed  4/23/2021- PEG tube placed  4/25/2021 - cxr 4/25/21 with A 40% LEFT pneumothorax. LEFT multi-sidehole pigtail catheter overlies LEFT lower hemithorax.. Bilateral multifocal and diffuse ill-defined airspace opacities..  Follow-up AP portable chest radiograph 4/25/2021 AT 8:58 AM: Residual LEFT 30% upper zone pneumothorax.  4/26/2021- Patient noted to have hemoglobin drop to 6.8 from 7.5, will transfuse one unit of PRBC and monitor CBC.   4/27/2021- Patient's hemoglobin dropped again to 7, will give two unit of PRBC  4/28/2021- On 4/28 Hb 6.8, s/p 1 unit of PRBC hb 7.5 now. 4 PRBC total  CTH and CT abdomen w/o contrast no evidence of bleed  No active signs of bleeding (No hematemesis, melena or hematuria)  F/u hemolysis w/u- negative so far, elevated reticulocytes . Dr Andrade consulted  5/2/2021-Patients Hb remained stable, was tachypneic and breathing over the vent so 1 mg push of Dilaudid X 2 was given. V: 20/400/60%/3+ on precedex drip at 1.5.   5/3/2021- patient was found to have large gastric bubble, G tube was connected to wall suction, repeat CXR showed improvement.  5/5/2021 - Hemoglobin dropped from 8.8 to 7.4.Will add bactrim empirically as patient is on chronic prednisone for organizing pneumonia. Tube feeds were held because of diarrhea  5/6/2021- CT chest was done, which showed mild PTX. Left 3rd intercostal space Chest tube was placed. Patient was hypotensive overnight. Cristiano temperature. Was septic, will send cultures if spikes temperature again. Patient was retaining urine, andrea was placed overnight. Will start on albumin and give one dose of lasix as patient was hypotensive and edematous in upper extremities, also had low albumin. Patient was getting agitated, will give ketamine IV push and monitor if it helps.  5/10/2021- Pt tachycardic overnight and hypertensive to 228/116 w/p 1x lopressor. Stopped levophed. Pt became hypotensive. Then started phenylephrine. BP now stable. Will d/c methadone and seroquel and continue on precedex for sedation. CXR showing apical pneumothorax   5/13/2021- Pt tachycardic to 160-170 yesterday afternoon and night. Pupils dilated. Did not respond to increased doses of opiates and benzos. Developed fever to 102.3 overnight with episode of vomiting. UA was positive. started empirically on vanc/zosyn - will d/c for now as no clear source of infection. AM abdominal xray w/ likely ileus. Will restart Free water. Hold Lasix. CT abdomen done showing ileus and possible fecal matter in rectum. Fecal disimpaction performed, small ball of stool retrieved with liquid stool excreted subsequently. Spoke to Surgery RUBI Wood. Tube feeds stopped and PEG tube placed to suction with no output.. Also will restart Precedex as likely in withdrawal and clonid  5/18/2021- Patient was tachy and agitated. Later he had hypotension and bradycardia. He was given 1L bolus and was restarted on pheny after which he improved  5/25- Behavioral health consulted due to difficultyn weaning off sedation . Recommended C/w Klonopin to 1 mg q8h standing (no PRNs), with plan to further taper as tolerated. C/w Seroquel 50 mg BID standing  5/26/2021- BP was elevated, was started on labetalol. d/c andrea.  CT placed on water seal, c/w water seal  5/27/2021- Vomiting X2, held feeding, CT Abdomen/Pelvis ordered  5/28/2021- Placed on trach collar today  5/30/2021- Patient was on trach collar throughout the day yesterday and placed back on vent overnight. No other acute events overnight.    5/31/2021- Decreased Methadone to 5 mg daily and Klonopin to 0.5 mg every 8 hours. Plan was made to transfer patient to [facility]U  6/1 Abd discomfort. AXR results as above, no bowel obstruction. Resume PEG feedings.   6/1 TC trial. Pt tolerated only 10 minutes, RR ~30's and anxious.  Will attempt SBT with PS.   6/3  Only tolerated 10 minutes of BSBT with PS 5. Became diaphoretic and c/o SOB   6/5  Tolerated 9 hours SBT yesterday and 2.5 hours today with PS 15  6/8 Tolerated 9 hrs of SBT PS 15   6/9 Tolerated 2 hrs of SBT with PS15, pt became diaphoretics and sob  6/10 Tolerated for 3 hours with PS of 15  6/11- Pt is medically clear for d/c. Wife and his brother Alec updated of plan for discharge and consent obtained for [employer] Covid-19 vaccine.

## 2021-06-11 NOTE — DISCHARGE NOTE NURSING/CASE MANAGEMENT/SOCIAL WORK - NSDCVIVACCINE_GEN_ALL_CORE_FT
No Vaccines Administered. Severe acute respiratory syndrome coronavirus 2 (SARS-CoV-2) (Coronavirus disease [COVID-19]) vaccine , 2021/6/11 12:47 , Jennifer Wyatt (RN)

## 2021-06-11 NOTE — PROGRESS NOTE ADULT - PROBLEM SELECTOR PLAN 9
Patient will require vent facility.   Authorization [facility] for [facility]  Currently only tolerating very short periods of SBT with PS support  Will continue SBT as tolerated.  SW following
Patient will require vent facility.   Awaiting authorization for [facility]  Currently only tolerating very short periods of SBT with PS support  Will continue SBT as tolerated.  SW following
DVT and GI ppx
Patient will require vent facility.  Currently only tolerating very short periods of SBT with PS support  Will continue SBT as tolerated.  PT following
Patient will require vent facility.  Currently only tolerating very short periods of SBT with PS support  Will continue SBT as tolerated.  PT following.
Patient will require vent facility.  Currently only tolerating very short periods of SBT with PS support  Will continue SBT as tolerated.  PT following.
DVT and GI ppx
DVT and GI prophylaxis.  Will need vent facility.  SW aware. List given to family.  Continue daily SBT  Overall prognosis is guarded.
Patient will require vent facility.  Currently only tolerating very short periods of SBT with PS support  Will continue SBT as tolerated.  PT following.
Patient will require vent facility.  Currently only tolerating very short periods of SBT with PS support  Will continue SBT as tolerated.  PT following.
DVT and GI ppx

## 2021-06-11 NOTE — PROGRESS NOTE ADULT - SUBJECTIVE AND OBJECTIVE BOX
Pt is awake, alert, lying in bed in NAD. D/C planning to [place].     INTERVAL HPI/OVERNIGHT EVENTS:      VITAL SIGNS:  T(F): 98.4 (06-11-21 @ 05:00)  HR: 82 (06-11-21 @ 08:02)  BP: 161/90 (06-11-21 @ 05:00)  RR: 18 (06-11-21 @ 05:00)  SpO2: 100% (06-11-21 @ 08:02)  Wt(kg): --  I&O's Detail    Mode: AC/ CMV (Assist Control/ Continuous Mandatory Ventilation)  RR (machine): 18  TV (machine): 450  FiO2: 40  PEEP: 5  ITime: 1  MAP: 10  PIP: 24        REVIEW OF SYSTEMS:    CONSTITUTIONAL:  No fevers, chills, sweats    HEENT:  Eyes:  No diplopia or blurred vision. ENT:  No earache, sore throat or runny nose.    CARDIOVASCULAR:  No pressure, squeezing, tightness, or heaviness about the chest; no palpitations.    RESPIRATORY:  Per HPI    GASTROINTESTINAL:  No abdominal pain, nausea, vomiting or diarrhea.    GENITOURINARY:  No dysuria, frequency or urgency.    NEUROLOGIC:  No paresthesias, fasciculations, seizures or weakness.    PSYCHIATRIC:  No disorder of thought or mood.      PHYSICAL EXAM:    Constitutional: Well developed and nourished  Eyes: Perrla  ENMT: normal  Neck: supple  Respiratory: good air entry; trach   Cardiovascular: S1 S2 regular  Gastrointestinal: Soft, Non tender; PEG.   Extremities: No edema  Vascular: normal  Neurological: Awake, alert,Ox3  Musculoskeletal: Normal      MEDICATIONS  (STANDING):  ascorbic acid 1000 milliGRAM(s) Oral daily  BACItracin   Ointment 1 Application(s) Topical two times a day  calcium carbonate 1250 mG  + Vitamin D (OsCal 500 + D) 1 Tablet(s) Oral daily  chlorhexidine 2% Cloths 1 Application(s) Topical <User Schedule>  cholecalciferol 1000 Unit(s) Oral daily  clonazePAM  Tablet 0.5 milliGRAM(s) Oral every 12 hours  enoxaparin Injectable 40 milliGRAM(s) SubCutaneous every 24 hours  gabapentin 100 milliGRAM(s) Oral every 8 hours  labetalol 100 milliGRAM(s) Enteral Tube two times a day  pantoprazole  Injectable 40 milliGRAM(s) IV Push daily  QUEtiapine 50 milliGRAM(s) Oral two times a day  senna 2 Tablet(s) Oral at bedtime  trimethoprim  40 mG/sulfamethoxazole 200 mG Suspension 160 milliGRAM(s) Oral <User Schedule>    MEDICATIONS  (PRN):  acetaminophen    Suspension .. 650 milliGRAM(s) Enteral Tube every 12 hours PRN Temp greater or equal to 38C (100.4F), Mild Pain (1 - 3)  ALBUTerol    90 MICROgram(s) HFA Inhaler 2 Puff(s) Inhalation every 6 hours PRN Shortness of Breath and/or Wheezing  artificial  tears Solution 1 Drop(s) Both EYES every 4 hours PRN Dry Eyes  bisacodyl Suppository 10 milliGRAM(s) Rectal daily PRN Constipation  ondansetron Injectable 4 milliGRAM(s) IV Push every 6 hours PRN Nausea and/or Vomiting  polyethylene glycol 3350 17 Gram(s) Oral daily PRN Constipation  sodium chloride 0.9% lock flush 10 milliLiter(s) IV Push every 1 hour PRN Pre/post blood products, medications, blood draw, and to maintain line patency      Allergies    No Known Allergies    Intolerances    LABS:    CAPILLARY BLOOD GLUCOSE    POCT Blood Glucose.: 125 mg/dL (11 Jun 2021 05:41)  POCT Blood Glucose.: 108 mg/dL (10 Nestor 2021 23:20)  POCT Blood Glucose.: 134 mg/dL (10 Nestor 2021 16:54)  POCT Blood Glucose.: 144 mg/dL (10 Nestor 2021 11:48)      RADIOLOGY & ADDITIONAL TESTS:    CXR:    Ct scan chest:    ekg;    echo:

## 2021-06-11 NOTE — CHART NOTE - NSCHARTNOTESELECT_GEN_ALL_CORE
Event Note
Fam update/Event Note
Fam update/Event Note
Family Update Note/Event Note
Family Update Note/Event Note
Family update/Event Note
Nutrition Services
Surgery/Event Note
Thoracic PA/Event Note
Thoracic PA/Off Service Note
Thoracic/Event Note
Thoracic/Event Note
Thoracic/Off Service Note
Event Note
Fam Update/Event Note
Family Update Note/Event Note
Family Update Note/Event Note
Family Update/Event Note
Family update note/Event Note
Family update/Event Note
Family/Event Note
Nutrition Services
Off Service Note
SCU - Receiving note/Event Note
[facility] CL- Collateral note/Event Note
Transfer Note
family upd/Event Note

## 2021-06-11 NOTE — PROGRESS NOTE ADULT - PROBLEM SELECTOR PLAN 1
Wean ventilator support as tolerated - FiO2 @ 40%.   SBT daily  Continue to wean as tolerated.   Tracheal care  Decubitus prevention  Supplemental nutrition  Monitor oxygen sat  Vit C, D and zinc supp  Montelukast 10 mgs po Qhs  Daily CXR   G-tube with feeds  Replace lytes  Pulmonary toilet  D/C planning to [place]   DVT and GI PPX.

## 2021-06-11 NOTE — PROGRESS NOTE ADULT - PROBLEM SELECTOR PLAN 1
S/p Dexamethasone , No Remdesivir  due to positive antibodies   CXR results as above  Continue Bactrim for empiric coverage   Continue with prednisone taper  Continue inhalers  Continue ventilatory support.   continue SBT with PS during the day as tolerated. Tolerated three hours today with PS15  Full vent support overnight  Monitor oxygen saturation.

## 2021-06-11 NOTE — DISCHARGE NOTE NURSING/CASE MANAGEMENT/SOCIAL WORK - PATIENT PORTAL LINK FT
You can access the FollowMyHealth Patient Portal offered by [facility] by registering at the following website: [email or web address]. By joining [employer]’s FollowMyHealth portal, you will also be able to view your health information using other applications (apps) compatible with our system.

## 2021-06-11 NOTE — PROGRESS NOTE ADULT - ATTENDING SUPERVISION STATEMENT
Resident
Resident/Fellow
Resident
Resident/Fellow
Resident
Resident/Fellow
Resident
Resident/Fellow
ACP
Resident
Resident/Fellow
Resident
Resident
Resident/Fellow
Resident
Resident/Fellow
ACP/Resident
Resident/Fellow
Resident
Resident
ACP
Resident

## 2021-06-11 NOTE — PROGRESS NOTE ADULT - PROBLEM SELECTOR PROBLEM 1
COVID-19
Acute hypoxemic respiratory failure due to COVID-19
COVID-19
Acute respiratory failure with hypoxia
COVID-19
Acute respiratory failure with hypoxia
COVID-19
Acute hypoxemic respiratory failure due to COVID-19
COVID-19
Acute hypoxemic respiratory failure due to COVID-19
Acute respiratory failure with hypoxia
COVID-19
Acute hypoxemic respiratory failure due to COVID-19
COVID-19
Acute hypoxemic respiratory failure due to COVID-19
COVID-19
Acute hypoxemic respiratory failure due to COVID-19
Acute hypoxemic respiratory failure due to COVID-19
COVID-19
Acute hypoxemic respiratory failure due to COVID-19
COVID-19
Acute hypoxemic respiratory failure due to COVID-19
Acute respiratory failure with hypoxia
Acute hypoxemic respiratory failure due to COVID-19
Acute respiratory failure with hypoxia
Acute hypoxemic respiratory failure due to COVID-19

## 2021-11-04 NOTE — PROGRESS NOTE ADULT - PROBLEM SELECTOR PLAN 1
isolation precautions DC  Cont mechanical ventilation - S.P trach.   Wean as tolerated  Tracheal care  Pulmonary toilet  Decubitus prevention  Supplemental nutrition  ICU management.   Follow up ABGs  Monitor oxygen sat  Monitor LFT, LDH, CRP, D-Dimer, Ferritin and procalcitonin  Vit C, D and zinc supp  Montelukast 10 mgs po Qhs  Daily CXR   G-tube with feeds  Replace lytes  Pulmonary toilet  DVT and GI PPX. STOP DRINKING ALCOHOL AND GO TO DETOX TO HELP YOU QUIT. Please see your primary care provider in 2-3 days.  Call for appointment.  If you have any problems with followup, please call the ED Referral Coordinator at [phone].  Return to the ER if symptoms worsen or other concerns.    Alcohol Abuse    Alcohol intoxication occurs when the amount of alcohol that a person has consumed impairs his or her ability to mentally and physically function. Chronic alcohol consumption can also lead to a variety of health issues including neurological disease, stomach disease, heart disease, liver disease, etc. Do not drive after drinking alcohol. Drinking enough alcohol to end up in an Emergency Room suggests you may have an alcohol abuse problem. Seek help at a drug addiction center.    SEEK IMMEDIATE MEDICAL CARE IF YOU HAVE ANY OF THE FOLLOWING SYMPTOMS: seizures, vomiting blood, blood in your stool, lightheadedness/dizziness, or becoming shaky to tremulous when you stop drinking.

## 2021-12-22 NOTE — ED ADULT NURSE NOTE - SEPSIS REFERENCE DATA CRITERIA 1
How Severe Is Your Atopic Dermatitis?: severe Is This A New Presentation, Or A Follow-Up?: Follow Up Atopic Dermatitis Abormal VS: Temp > 100F or < 96.8F; SBP < 90 mmHG; HR > 120bpm; Resp > 24/min

## 2022-05-27 NOTE — PROGRESS NOTE ADULT - ASSESSMENT
Awaiting pt to c/b to schedule 24 hr BP monitor, follow-up on Rx and regarding 2 wk BP readings. Message sent via pt portal.       Assessment and Recommendation:   Problem/Recommendation - 1:  Problem: COVID-19. Recommendation: isolation precautions  oxygen supp PRN  monitor oxygen sat  check LFT, LDH, CRP, D-Dimer, Ferritin and procalcitonin  Vit C, D and zinc supp  montelukast 10 mgs po Qhs  IV steroids.    Problem/Recommendation - 2:  ·  Problem: UTI (urinary tract infection).  Recommendation: panculture  antibiotics.     Problem/Recommendation - 3:  ·  Problem: Chest pain.  Recommendation: likely related to Covid-19 infection.     Problem/Recommendation - 4:  ·  Problem: Transaminitis.  Recommendation: monitor LFTs  GI F/U

## 2022-06-07 NOTE — PROGRESS NOTE ADULT - PROBLEM SELECTOR PROBLEM 7
Care Management Follow Up    Length of Stay (days): 5    Expected Discharge Date: 06/07/2022     Concerns to be Addressed:       Patient plan of care discussed at interdisciplinary rounds: Yes    Anticipated Discharge Disposition: Home     Anticipated Discharge Services:    Anticipated Discharge DME:      Patient/family educated on Medicare website which has current facility and service quality ratings:    Education Provided on the Discharge Plan:    Patient/Family in Agreement with the Plan:      Referrals Placed by CM/SW:    Private pay costs discussed: Not applicable    Additional Information:  Writer completed authorization for [employer]. Authorization number is [id number] 6/7-6/16/22 and is good for 7 days if patient does not discharge today.    PAS-RR    D: Per DHS regulation, SW completed and submitted PAS-RR to [place] [employer] Direct Connect via the Senior LinkAge Line.  PAS-RR confirmation # is : [id number]    I: SW spoke with family and they are aware a PAS-RR has been submitted.  SW reviewed with family that they may be contacted for a follow up appointment within 10 days of hospital discharge if their SNF stay is < 30 days.  Contact information for [facility] was also provided.    A: family verbalized understanding.    P: Further questions may be directed to [facility] LinkAge Line at #[phone], option #4 for PAS-RR staff.        TIGER Haddad         Pneumothorax on left

## 2022-07-16 NOTE — PROGRESS NOTE ADULT - PROBLEM SELECTOR PROBLEM 7
HPI:  66 y/o MALE with a PMHx HTN, GERD, Anxiety, Parkinson's disease s/p surgery for fiducial marker implantation 3/22/22, s/p Right DBS to STN with microelectrode  recording 3/29/22, who underwent stage 3 implantation of IPG with dual extension leads 4/5/22 and fiducial markers last week.  Parkinson's disease diagnosed in 2008, was on meds, through 2016. Initial symptoms included a lip tremor and slowing of his left side movements, has progressed with some cognitive impairment and forgetfulness in taking his sinemet, now left side tremors have responded to DBS and he has right sided tremors. Currently takes: Sinemet 25/250 total 9 tabs/day.     (28 Jun 2022 06:41)    INTERVAL EVENTS:  c/o knee pain, relieved with Tylenol    HOSPITAL COURSE:  6/28: POD# 0 S/P L STN DBS with microelectrode recording. New Lead connected to dual chamber IPG that is already in place. (Prior Rt STN DBS and Lead already connected to IPG in other chamber). Postop given 1.5mg ativan, haldol 2mg and precedex gtt for agitation. Given 0.4mg flumazenil for ativan reversal due to possibility that it contributed to agitation. Cardene gtt started.   6/29: POD1, o/n NGT placed and replaced due to agitation and inability to take sinamet PO (dose delayed several hours), CT head completed for increased lethargy and not FC later in the night which showed pneumocephalus but otherwise stable, early in the morning after having recieved sinamet exam improved, neurology consulted. Precedex and cardene gtts weaned off. 1L bolus given for SBP 90s.   6/30: POD2, CAMILA o/n, stepped down from ICU, weaned off precedex, given 25mg seroquel in AM, zyprexa 5mg IM in evening followed by 3mg IM haldol for agitation.   7/1: POD3, o/n given additional 1mg IM haldol for agitation, neuro stable, DC Haldol as per Neurology and start seroquel betime 25  7/2: POD 4. CAMILA overnight. Received haldol 1 IM at change of shift yesterday for agitation. Exam stable. pending rehab, not agitated this morning, retaining urine on bladder scan - salazar placed by  due to resistance and blood tinged urine when attempted by RN. EKG and Cardiac enzymes for tachycardia and subjective chest pain  7/3: Continued agitation - seroquel increased to 50mg QHS with PRN seroquel. QTc 478. Clonidine discontinued. Started on flomax for urinary retention., restraints dc'd, episode of tachycardia, resolved with tx of agitation   7/4; POD6, QTc 449, less agitated.  7/5: POD7, CAMILA overnight, agitated overnight received prn seroquel and was placed on wrist restraints for singing at nursing staff. F/u TOV. New rash on back and inside L arm.   7/6: POD8, agitated o/n, remains on one to one supervision. off restratined. voiding, pending [place]   7/7: POD9, agitated overnight received seroquel. Acute change with lethargy and unresponsiveness, stroke code and rapid response called CTH showing acute on chronic SDH L > R, CTA showing R carotid stenosis. Patient was placed on EEG and started on Keppra 500 BID, and has returned to baseline  7/8: POD10, CAMILA overnight, Given Zyprexa overnight for agitation - ripped off EEG leads. psych reconsulted  7/9: POD11, agitated overnight, given seroquel, tachy to 120s and hypertensive to 180s, given lopressor, hydral and labetalol and 500cc NS bolus which resolved. Pending LE dopplers, keppra switched to brivarecetam for agitation   7/10: POD 12. CAMILA overnight. Pending doppler read. Pending authorization for [place].  7/11: POD 13. CAMILA overnight. Given lactulose syrup. new episode of obutndation, responds to stimulation, able to say name, open mouth breathing. sbp in 200s, hydralazine 10 mg given, pt bp normalized to 120s, of note pt missed a dose of his sinemet. CT scan done immediately prior to episode is unchanged/stable. neurology notified. pt labile and sensitive to his meds. seroquel increased to additional dose of 12.5 mg at 9 AM   7/12: POD 14 increasingly agitated overnight, persistently wanting to get out of bed, was told he swung at the PCA, received IM Zyprexa. Placed back on soft vest for harm to others/self. Proceeded to increase agitation, kicked the PCA, was given 1 mg IV Ativan.   7/13: POD15. CAMILA o/n neuro stabl.e Agitation stable during day, increased seroquel to 150qHS, sinemet dosing adjusted by neurology   7/14: POD 16. CAMILA overnight. Neurology continues to follow recommendations appreciated. Pending AR.  7/15: POD 17. ACMILA, no agitation.   7/16: POD18. CAMILA o/n neuro stable. Calm overnight.     Vital Signs Last 24 Hrs  T(C): 36.9 (15 Jul 2022 21:55), Max: 36.9 (15 Jul 2022 04:55)  T(F): 98.5 (15 Jul 2022 21:55), Max: 98.5 (15 Jul 2022 21:55)  HR: 76 (15 Jul 2022 20:59) (66 - 94)  BP: 135/59 (15 Jul 2022 20:59) (113/58 - 135/59)  BP(mean): 83 (15 Jul 2022 20:59) (78 - 92)  RR: 18 (15 Jul 2022 20:59) (18 - 18)  SpO2: 98% (15 Jul 2022 20:59) (96% - 98%)    Parameters below as of 15 Jul 2022 20:59  Patient On (Oxygen Delivery Method): room air        I&O's Summary    14 Jul 2022 07:01  -  15 Jul 2022 07:00  --------------------------------------------------------  IN: 0 mL / OUT: 700 mL / NET: -700 mL    15 Jul 2022 07:01  -  16 Jul 2022 00:57  --------------------------------------------------------  IN: 480 mL / OUT: 375 mL / NET: 105 mL        PHYSICAL EXAM:  GEN: laying in bed, appears well, NAD  NEURO: AOx3. FC, OE spont, speech intact, face symmetric. CNII-XII intact. PERRL, EOMI. No pronator drift. MAEx4. 5/5 strength throughout. SILT  CV: RRR +S1/S2  PULM: CTAB  GI: Abd soft, NT/ND  EXT: ext warm, dry, nontender  WOUND: L crani site c/d/i      TUBES/LINES:  [] Salazar  [] Trach  [] NGT  [] PEG  [] Wound Drains  [] Others    DIET:  [] NPO  [x] Mechanical  [] Tube feeds    LABS:                        14.7   7.31  )-----------( 345      ( 14 Jul 2022 05:30 )             43.3     07-14    140  |  107  |  16  ----------------------------<  124<H>  4.0   |  24  |  0.66    Ca    9.1      14 Jul 2022 05:30  Phos  3.1     07-14  Mg     2.4     07-14              CAPILLARY BLOOD GLUCOSE          Drug Levels: [] N/A    CSF Analysis: [] N/A      Allergies    No Known Allergies    Intolerances      MEDICATIONS:  Antibiotics:    Neuro:  acetaminophen     Tablet .. 650 milliGRAM(s) Oral every 6 hours PRN  carbidopa/levodopa  25/100 1 Tablet(s) Oral <User Schedule>  carbidopa/levodopa  25/250 1 Tablet(s) Oral <User Schedule>  carbidopa/levodopa CR 25/100 1 Tablet(s) Oral <User Schedule>  melatonin 5 milliGRAM(s) Oral at bedtime  OLANZapine Injectable 5 milliGRAM(s) IntraMuscular every 8 hours PRN  ondansetron Injectable 4 milliGRAM(s) IV Push every 6 hours PRN  QUEtiapine 25 milliGRAM(s) Oral every 8 hours PRN  QUEtiapine 12.5 milliGRAM(s) Oral <User Schedule>  QUEtiapine 150 milliGRAM(s) Oral at bedtime  rivastigmine 1.5 milliGRAM(s) Oral <User Schedule>    Anticoagulation:  heparin   Injectable 5000 Unit(s) SubCutaneous every 8 hours    OTHER:  bisacodyl 5 milliGRAM(s) Oral daily  nystatin Powder 1 Application(s) Topical two times a day  polyethylene glycol 3350 17 Gram(s) Oral daily  senna 2 Tablet(s) Oral at bedtime  tamsulosin 0.4 milliGRAM(s) Oral at bedtime    IVF:    CULTURES:    RADIOLOGY & ADDITIONAL TESTS:      ASSESSMENT/PLAN:  66 y/o MALE with a PMHx HTN, GERD, Anxiety, Parkinson's disease s/p surgery for fiducial marker implantation 3/22/22, s/p Right DBS to STN with microelectrode  recording 3/29/22, who underwent stage 3 implantation of IPG with dual extension leads 4/5/22 and fiducial markers last week, now s/p L STN DBS (6/28/22) with Dr. Perez.    Neuro:   - Neuro/vital checks q 4  - repeat CTH 6/28, 7/11 stable, pneumocephalus  - Continue Sinemet, continue to titrate dosing per neurology   - Rivastigamine 1.5 once started per neurology reccs  - Neurology/psych following  - Seizure prophylaxis discontinued   - For agitation: PRN seroquel 25mg Q8H for breakthrough agitation/delirium, zyprexa 5mg IM if needed   - Seroquel increased to 150 QHS per neurology, 12.5 AM     Cardio  - -160  - daily EKG for QTC (455 7/7)    PULM  - satting well on RA     GI  - regular diet   - Bowel regimen   - last BM 7/12    RENAL  - Voiding  - Flomax 0.4mg     ID  - afebrile    Endo  - A1C 6.0    HEME   - SCDs/SQH   - LE dopplers negative for DVT 7/9    DERM  -nystatin to groin rash and skin protectant cream to contact derm rash on back and L arm    DISPO  - PT/OT   - SDU status  - full code  - Family updated with plan   - pending AR    D/W Dr. Perez      Pneumothorax on left

## 2022-10-23 NOTE — PHYSICAL THERAPY INITIAL EVALUATION ADULT - GENERAL OBSERVATIONS, REHAB EVAL
no Pt received supine in bed with HOB elevated. Cardiac monitoring, +IV, +apical chest tube, +supplemental O2 5L to vent, andrea, +PEG, RASS 0; CAM ICU (-) Pt received supine in bed with HOB elevated. Cardiac monitoring, +IV, +Lt. apical chest tube, +supplemental O2 5L to vent, andrea, +PEG, RASS 0; CAM ICU (-)

## 2023-02-08 NOTE — PATIENT PROFILE ADULT - NSTRANSFERBELONGINGSRESP_GEN_A_NUR
Drysol Pregnancy And Lactation Text: This medication is considered safe during pregnancy and breast feeding. yes

## 2024-03-27 NOTE — BH CONSULTATION LIAISON PROGRESS NOTE - NS ED [facility] MED ROS PSYCHIATRIC
LVM for patient to call back to schedule CT and F/U appointment.   
See HPI

## 2024-04-16 NOTE — PROGRESS NOTE ADULT - ASSESSMENT
55 year old male with trace apical pneumothorax    - AM CXR appears stable, will await official report  - continue care per ICU  - please inform thoracic surgery if respiratory status changes  - will discuss with Dr. Lanier  Please inform patient of the following results  BUN is improved and is almost back to normal   Lipase is normal as well

## 2024-04-30 NOTE — PROGRESS NOTE ADULT - PROVIDER SPECIALTY LIST ADULT
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

## 2024-10-10 NOTE — CONSULT NOTE ADULT - PROBLEM SELECTOR RECOMMENDATION 9
[At Term] : at term isolation precautions  oxygen supp  monitor oxygen sat  check LFT, LDH, CRP, D-Dimer, Ferritin and procalcitonin  Vit C, D and zinc supp  montelukast 10 mgs po Qhs  IV steroids [Normal Vaginal Route] : by normal vaginal route